# Patient Record
Sex: MALE | Race: WHITE | NOT HISPANIC OR LATINO | Employment: OTHER | URBAN - METROPOLITAN AREA
[De-identification: names, ages, dates, MRNs, and addresses within clinical notes are randomized per-mention and may not be internally consistent; named-entity substitution may affect disease eponyms.]

---

## 2017-01-03 ENCOUNTER — AMBULATORY - HEALTHEAST (OUTPATIENT)
Dept: CARDIOLOGY | Facility: CLINIC | Age: 72
End: 2017-01-03

## 2017-01-03 DIAGNOSIS — I48.19 PERSISTENT ATRIAL FIBRILLATION (H): ICD-10-CM

## 2017-01-05 ENCOUNTER — AMBULATORY - HEALTHEAST (OUTPATIENT)
Dept: CARDIOLOGY | Facility: CLINIC | Age: 72
End: 2017-01-05

## 2017-01-05 DIAGNOSIS — Z95.810 ICD (IMPLANTABLE CARDIOVERTER-DEFIBRILLATOR) IN PLACE: ICD-10-CM

## 2017-01-05 DIAGNOSIS — I48.91 AF (ATRIAL FIBRILLATION) (H): ICD-10-CM

## 2017-01-05 ASSESSMENT — MIFFLIN-ST. JEOR: SCORE: 2040.34

## 2017-01-10 ENCOUNTER — AMBULATORY - HEALTHEAST (OUTPATIENT)
Dept: CARDIOLOGY | Facility: CLINIC | Age: 72
End: 2017-01-10

## 2017-01-10 DIAGNOSIS — I48.19 PERSISTENT ATRIAL FIBRILLATION (H): ICD-10-CM

## 2017-01-11 ENCOUNTER — AMBULATORY - HEALTHEAST (OUTPATIENT)
Dept: CARDIOLOGY | Facility: CLINIC | Age: 72
End: 2017-01-11

## 2017-01-16 ENCOUNTER — COMMUNICATION - HEALTHEAST (OUTPATIENT)
Dept: CARDIOLOGY | Facility: CLINIC | Age: 72
End: 2017-01-16

## 2017-01-18 ENCOUNTER — AMBULATORY - HEALTHEAST (OUTPATIENT)
Dept: CARDIOLOGY | Facility: CLINIC | Age: 72
End: 2017-01-18

## 2017-01-18 DIAGNOSIS — I48.19 PERSISTENT ATRIAL FIBRILLATION (H): ICD-10-CM

## 2017-01-24 ENCOUNTER — COMMUNICATION - HEALTHEAST (OUTPATIENT)
Dept: CARDIOLOGY | Facility: CLINIC | Age: 72
End: 2017-01-24

## 2017-01-24 ENCOUNTER — AMBULATORY - HEALTHEAST (OUTPATIENT)
Dept: CARDIOLOGY | Facility: CLINIC | Age: 72
End: 2017-01-24

## 2017-01-24 DIAGNOSIS — I48.19 PERSISTENT ATRIAL FIBRILLATION (H): ICD-10-CM

## 2017-01-24 DIAGNOSIS — I42.8 OTHER PRIMARY CARDIOMYOPATHIES: ICD-10-CM

## 2017-01-31 ENCOUNTER — AMBULATORY - HEALTHEAST (OUTPATIENT)
Dept: CARDIOLOGY | Facility: CLINIC | Age: 72
End: 2017-01-31

## 2017-01-31 DIAGNOSIS — I48.19 PERSISTENT ATRIAL FIBRILLATION (H): ICD-10-CM

## 2017-02-09 ENCOUNTER — COMMUNICATION - HEALTHEAST (OUTPATIENT)
Dept: CARDIOLOGY | Facility: CLINIC | Age: 72
End: 2017-02-09

## 2017-02-09 DIAGNOSIS — I48.19 PERSISTENT ATRIAL FIBRILLATION (H): ICD-10-CM

## 2017-02-09 DIAGNOSIS — I42.9 IDIOPATHIC CARDIOMYOPATHY (H): ICD-10-CM

## 2017-02-21 ENCOUNTER — AMBULATORY - HEALTHEAST (OUTPATIENT)
Dept: CARDIOLOGY | Facility: CLINIC | Age: 72
End: 2017-02-21

## 2017-02-21 DIAGNOSIS — I48.19 PERSISTENT ATRIAL FIBRILLATION (H): ICD-10-CM

## 2017-02-22 ENCOUNTER — COMMUNICATION - HEALTHEAST (OUTPATIENT)
Dept: CARDIOLOGY | Facility: CLINIC | Age: 72
End: 2017-02-22

## 2017-02-22 DIAGNOSIS — I48.19 PERSISTENT ATRIAL FIBRILLATION (H): ICD-10-CM

## 2017-02-28 ENCOUNTER — AMBULATORY - HEALTHEAST (OUTPATIENT)
Dept: CARDIOLOGY | Facility: CLINIC | Age: 72
End: 2017-02-28

## 2017-02-28 DIAGNOSIS — I48.19 PERSISTENT ATRIAL FIBRILLATION (H): ICD-10-CM

## 2017-03-06 ENCOUNTER — AMBULATORY - HEALTHEAST (OUTPATIENT)
Dept: CARDIOLOGY | Facility: CLINIC | Age: 72
End: 2017-03-06

## 2017-03-06 DIAGNOSIS — Z95.810 ICD (IMPLANTABLE CARDIOVERTER-DEFIBRILLATOR) IN PLACE: ICD-10-CM

## 2017-03-07 ENCOUNTER — AMBULATORY - HEALTHEAST (OUTPATIENT)
Dept: CARDIOLOGY | Facility: CLINIC | Age: 72
End: 2017-03-07

## 2017-03-07 DIAGNOSIS — I48.19 PERSISTENT ATRIAL FIBRILLATION (H): ICD-10-CM

## 2017-03-09 ENCOUNTER — AMBULATORY - HEALTHEAST (OUTPATIENT)
Dept: CARDIOLOGY | Facility: CLINIC | Age: 72
End: 2017-03-09

## 2017-03-09 ENCOUNTER — OFFICE VISIT - HEALTHEAST (OUTPATIENT)
Dept: CARDIOLOGY | Facility: CLINIC | Age: 72
End: 2017-03-09

## 2017-03-09 DIAGNOSIS — Z86.79 HISTORY OF SICK SINUS SYNDROME: ICD-10-CM

## 2017-03-09 DIAGNOSIS — I10 ESSENTIAL HYPERTENSION WITH GOAL BLOOD PRESSURE LESS THAN 130/85: ICD-10-CM

## 2017-03-09 DIAGNOSIS — I48.19 PERSISTENT ATRIAL FIBRILLATION (H): ICD-10-CM

## 2017-03-09 DIAGNOSIS — I42.9 CARDIOMYOPATHY, IDIOPATHIC (H): ICD-10-CM

## 2017-03-09 ASSESSMENT — MIFFLIN-ST. JEOR: SCORE: 2102.94

## 2017-03-14 ENCOUNTER — AMBULATORY - HEALTHEAST (OUTPATIENT)
Dept: CARDIOLOGY | Facility: CLINIC | Age: 72
End: 2017-03-14

## 2017-03-14 DIAGNOSIS — I48.19 PERSISTENT ATRIAL FIBRILLATION (H): ICD-10-CM

## 2017-03-21 ENCOUNTER — AMBULATORY - HEALTHEAST (OUTPATIENT)
Dept: CARDIOLOGY | Facility: CLINIC | Age: 72
End: 2017-03-21

## 2017-03-21 DIAGNOSIS — I48.19 PERSISTENT ATRIAL FIBRILLATION (H): ICD-10-CM

## 2017-03-27 ENCOUNTER — AMBULATORY - HEALTHEAST (OUTPATIENT)
Dept: CARDIOLOGY | Facility: CLINIC | Age: 72
End: 2017-03-27

## 2017-03-27 ENCOUNTER — COMMUNICATION - HEALTHEAST (OUTPATIENT)
Dept: CARDIOLOGY | Facility: CLINIC | Age: 72
End: 2017-03-27

## 2017-03-27 DIAGNOSIS — Z95.810 ICD (IMPLANTABLE CARDIOVERTER-DEFIBRILLATOR) IN PLACE: ICD-10-CM

## 2017-03-27 DIAGNOSIS — I48.19 PERSISTENT ATRIAL FIBRILLATION (H): ICD-10-CM

## 2017-03-28 ENCOUNTER — AMBULATORY - HEALTHEAST (OUTPATIENT)
Dept: CARDIOLOGY | Facility: CLINIC | Age: 72
End: 2017-03-28

## 2017-03-28 DIAGNOSIS — I48.19 PERSISTENT ATRIAL FIBRILLATION (H): ICD-10-CM

## 2017-03-30 ENCOUNTER — COMMUNICATION - HEALTHEAST (OUTPATIENT)
Dept: CARDIOLOGY | Facility: CLINIC | Age: 72
End: 2017-03-30

## 2017-03-30 ENCOUNTER — AMBULATORY - HEALTHEAST (OUTPATIENT)
Dept: CARDIOLOGY | Facility: CLINIC | Age: 72
End: 2017-03-30

## 2017-04-04 ENCOUNTER — AMBULATORY - HEALTHEAST (OUTPATIENT)
Dept: CARDIOLOGY | Facility: CLINIC | Age: 72
End: 2017-04-04

## 2017-04-04 DIAGNOSIS — I48.19 PERSISTENT ATRIAL FIBRILLATION (H): ICD-10-CM

## 2017-04-05 ENCOUNTER — AMBULATORY - HEALTHEAST (OUTPATIENT)
Dept: CARDIOLOGY | Facility: CLINIC | Age: 72
End: 2017-04-05

## 2017-04-05 DIAGNOSIS — Z95.810 ICD (IMPLANTABLE CARDIOVERTER-DEFIBRILLATOR), DUAL, IN SITU: ICD-10-CM

## 2017-04-11 ENCOUNTER — AMBULATORY - HEALTHEAST (OUTPATIENT)
Dept: CARDIOLOGY | Facility: CLINIC | Age: 72
End: 2017-04-11

## 2017-04-11 DIAGNOSIS — I48.19 PERSISTENT ATRIAL FIBRILLATION (H): ICD-10-CM

## 2017-04-18 ENCOUNTER — AMBULATORY - HEALTHEAST (OUTPATIENT)
Dept: CARDIOLOGY | Facility: CLINIC | Age: 72
End: 2017-04-18

## 2017-04-18 DIAGNOSIS — I48.19 PERSISTENT ATRIAL FIBRILLATION (H): ICD-10-CM

## 2017-04-27 ENCOUNTER — COMMUNICATION - HEALTHEAST (OUTPATIENT)
Dept: CARDIOLOGY | Facility: CLINIC | Age: 72
End: 2017-04-27

## 2017-04-27 ENCOUNTER — AMBULATORY - HEALTHEAST (OUTPATIENT)
Dept: CARDIOLOGY | Facility: CLINIC | Age: 72
End: 2017-04-27

## 2017-04-27 DIAGNOSIS — Z95.810 ICD (IMPLANTABLE CARDIOVERTER-DEFIBRILLATOR), DUAL, IN SITU: ICD-10-CM

## 2017-04-28 ENCOUNTER — SURGERY - HEALTHEAST (OUTPATIENT)
Dept: GASTROENTEROLOGY | Facility: CLINIC | Age: 72
End: 2017-04-28

## 2017-04-28 ASSESSMENT — MIFFLIN-ST. JEOR: SCORE: 2008.59

## 2017-05-02 ENCOUNTER — AMBULATORY - HEALTHEAST (OUTPATIENT)
Dept: CARDIOLOGY | Facility: CLINIC | Age: 72
End: 2017-05-02

## 2017-05-02 DIAGNOSIS — I48.19 PERSISTENT ATRIAL FIBRILLATION (H): ICD-10-CM

## 2017-05-03 ENCOUNTER — COMMUNICATION - HEALTHEAST (OUTPATIENT)
Dept: CARDIOLOGY | Facility: CLINIC | Age: 72
End: 2017-05-03

## 2017-05-03 DIAGNOSIS — I48.19 PERSISTENT ATRIAL FIBRILLATION (H): ICD-10-CM

## 2017-05-09 ENCOUNTER — AMBULATORY - HEALTHEAST (OUTPATIENT)
Dept: CARDIOLOGY | Facility: CLINIC | Age: 72
End: 2017-05-09

## 2017-05-09 DIAGNOSIS — I48.19 PERSISTENT ATRIAL FIBRILLATION (H): ICD-10-CM

## 2017-05-16 ENCOUNTER — AMBULATORY - HEALTHEAST (OUTPATIENT)
Dept: CARDIOLOGY | Facility: CLINIC | Age: 72
End: 2017-05-16

## 2017-05-16 DIAGNOSIS — I48.19 PERSISTENT ATRIAL FIBRILLATION (H): ICD-10-CM

## 2017-05-19 ENCOUNTER — AMBULATORY - HEALTHEAST (OUTPATIENT)
Dept: CARDIOLOGY | Facility: CLINIC | Age: 72
End: 2017-05-19

## 2017-05-19 DIAGNOSIS — Z95.810 ICD (IMPLANTABLE CARDIOVERTER-DEFIBRILLATOR), DUAL, IN SITU: ICD-10-CM

## 2017-05-19 DIAGNOSIS — I48.19 PERSISTENT ATRIAL FIBRILLATION (H): ICD-10-CM

## 2017-05-19 DIAGNOSIS — I42.9 IDIOPATHIC CARDIOMYOPATHY (H): ICD-10-CM

## 2017-05-19 DIAGNOSIS — I42.9 CARDIOMYOPATHY, IDIOPATHIC (H): ICD-10-CM

## 2017-05-19 DIAGNOSIS — Z86.79 HISTORY OF SICK SINUS SYNDROME: ICD-10-CM

## 2017-05-19 LAB — HCC DEVICE COMMENTS: NORMAL

## 2017-05-19 ASSESSMENT — MIFFLIN-ST. JEOR: SCORE: 2081.17

## 2017-05-21 ENCOUNTER — COMMUNICATION - HEALTHEAST (OUTPATIENT)
Dept: CARDIOLOGY | Facility: CLINIC | Age: 72
End: 2017-05-21

## 2017-05-21 DIAGNOSIS — I48.0 PAROXYSMAL ATRIAL FIBRILLATION (H): ICD-10-CM

## 2017-05-23 ENCOUNTER — AMBULATORY - HEALTHEAST (OUTPATIENT)
Dept: CARDIOLOGY | Facility: CLINIC | Age: 72
End: 2017-05-23

## 2017-05-23 DIAGNOSIS — I48.19 PERSISTENT ATRIAL FIBRILLATION (H): ICD-10-CM

## 2017-05-30 ENCOUNTER — AMBULATORY - HEALTHEAST (OUTPATIENT)
Dept: CARDIOLOGY | Facility: CLINIC | Age: 72
End: 2017-05-30

## 2017-05-30 DIAGNOSIS — Z95.810 ICD (IMPLANTABLE CARDIOVERTER-DEFIBRILLATOR), DUAL, IN SITU: ICD-10-CM

## 2017-05-30 DIAGNOSIS — I48.19 PERSISTENT ATRIAL FIBRILLATION (H): ICD-10-CM

## 2017-05-30 LAB — HCC DEVICE COMMENTS: NORMAL

## 2017-05-31 ENCOUNTER — AMBULATORY - HEALTHEAST (OUTPATIENT)
Dept: CARDIOLOGY | Facility: CLINIC | Age: 72
End: 2017-05-31

## 2017-05-31 DIAGNOSIS — I48.19 PERSISTENT ATRIAL FIBRILLATION (H): ICD-10-CM

## 2017-06-06 ENCOUNTER — AMBULATORY - HEALTHEAST (OUTPATIENT)
Dept: CARDIOLOGY | Facility: CLINIC | Age: 72
End: 2017-06-06

## 2017-06-06 DIAGNOSIS — I48.19 PERSISTENT ATRIAL FIBRILLATION (H): ICD-10-CM

## 2017-06-07 ENCOUNTER — AMBULATORY - HEALTHEAST (OUTPATIENT)
Dept: CARDIOLOGY | Facility: CLINIC | Age: 72
End: 2017-06-07

## 2017-06-07 DIAGNOSIS — Z95.810 ICD (IMPLANTABLE CARDIOVERTER-DEFIBRILLATOR), DUAL, IN SITU: ICD-10-CM

## 2017-06-07 DIAGNOSIS — I42.9 CARDIOMYOPATHY, IDIOPATHIC (H): ICD-10-CM

## 2017-06-07 DIAGNOSIS — I48.19 PERSISTENT ATRIAL FIBRILLATION (H): ICD-10-CM

## 2017-06-07 DIAGNOSIS — Z98.890 STATUS POST CATHETER ABLATION OF ATRIAL FIBRILLATION: ICD-10-CM

## 2017-06-07 DIAGNOSIS — I10 ESSENTIAL HYPERTENSION WITH GOAL BLOOD PRESSURE LESS THAN 130/80: ICD-10-CM

## 2017-06-07 LAB — HCC DEVICE COMMENTS: NORMAL

## 2017-06-07 ASSESSMENT — MIFFLIN-ST. JEOR: SCORE: 2089.1

## 2017-06-13 ENCOUNTER — AMBULATORY - HEALTHEAST (OUTPATIENT)
Dept: CARDIOLOGY | Facility: CLINIC | Age: 72
End: 2017-06-13

## 2017-06-13 DIAGNOSIS — I48.19 PERSISTENT ATRIAL FIBRILLATION (H): ICD-10-CM

## 2017-06-14 ENCOUNTER — AMBULATORY - HEALTHEAST (OUTPATIENT)
Dept: CARDIOLOGY | Facility: CLINIC | Age: 72
End: 2017-06-14

## 2017-06-16 ENCOUNTER — AMBULATORY - HEALTHEAST (OUTPATIENT)
Dept: CARDIOLOGY | Facility: CLINIC | Age: 72
End: 2017-06-16

## 2017-06-16 ENCOUNTER — ANESTHESIA - HEALTHEAST (OUTPATIENT)
Dept: CARDIOLOGY | Facility: CLINIC | Age: 72
End: 2017-06-16

## 2017-06-16 DIAGNOSIS — Z95.810 ICD (IMPLANTABLE CARDIOVERTER-DEFIBRILLATOR), DUAL, IN SITU: ICD-10-CM

## 2017-06-19 ENCOUNTER — SURGERY - HEALTHEAST (OUTPATIENT)
Dept: CARDIOLOGY | Facility: CLINIC | Age: 72
End: 2017-06-19

## 2017-06-19 ENCOUNTER — HOSPITAL ENCOUNTER (OUTPATIENT)
Dept: CARDIOLOGY | Facility: HOSPITAL | Age: 72
Discharge: HOME | End: 2017-06-19
Attending: INTERNAL MEDICINE

## 2017-06-19 DIAGNOSIS — I42.9 CARDIOMYOPATHY, IDIOPATHIC (H): ICD-10-CM

## 2017-06-19 DIAGNOSIS — I47.19 ATRIAL TACHYCARDIA (H): ICD-10-CM

## 2017-06-19 LAB
AORTIC ROOT: 3.6 CM
BSA FOR ECHO PROCEDURE: 2.59 M2
CV BLOOD PRESSURE: NORMAL MMHG
CV ECHO HEIGHT: 75 IN
CV ECHO WEIGHT: 280 LBS
DOP CALC LVOT AREA: 3.46 CM2
DOP CALC LVOT DIAMETER: 2.1 CM
DOP CALC LVOT PEAK VEL: 81 CM/S
DOP CALC LVOT STROKE VOLUME: 54 CM3
DOP CALCLVOT PEAK VEL VTI: 15.6 CM
ECHO EJECTION FRACTION ESTIMATED: 50 %
EJECTION FRACTION: 68 % (ref 55–75)
FRACTIONAL SHORTENING: 30.4 % (ref 28–44)
HCC DEVICE COMMENTS: NORMAL
INTERVENTRICULAR SEPTUM IN END DIASTOLE: 1.1 CM (ref 0.6–1)
IVS/PW RATIO: 1
LEFT ATRIUM AREA: 30.8 CM2
LEFT ATRIUM LENGTH: 7.6 CM
LEFT ATRIUM SIZE: 5.7 CM
LEFT ATRIUM TO AORTIC ROOT RATIO: 1.58 NO UNITS
LEFT VENTRICLE DIASTOLIC VOLUME INDEX: 30.1 CM3/M2 (ref 34–74)
LEFT VENTRICLE DIASTOLIC VOLUME: 78 CM3 (ref 62–150)
LEFT VENTRICLE MASS INDEX: 96.3 G/M2
LEFT VENTRICLE SYSTOLIC VOLUME INDEX: 9.7 CM3/M2 (ref 11–31)
LEFT VENTRICLE SYSTOLIC VOLUME: 25 CM3 (ref 21–61)
LEFT VENTRICULAR INTERNAL DIMENSION IN DIASTOLE: 5.6 CM (ref 4.2–5.8)
LEFT VENTRICULAR INTERNAL DIMENSION IN SYSTOLE: 3.9 CM (ref 2.5–4)
LEFT VENTRICULAR MASS: 249.3 G
LEFT VENTRICULAR OUTFLOW TRACT MEAN GRADIENT: 2 MMHG
LEFT VENTRICULAR OUTFLOW TRACT MEAN VELOCITY: 58.2 CM/S
LEFT VENTRICULAR OUTFLOW TRACT PEAK GRADIENT: 3 MMHG
LEFT VENTRICULAR POSTERIOR WALL IN END DIASTOLE: 1.1 CM (ref 0.6–1)
LV STROKE VOLUME INDEX: 20.9 ML/M2
MITRAL VALVE E/A RATIO: 2.4
MV AVERAGE E/E' RATIO: 11.6 CM/S
MV DECELERATION TIME: 215 MS
MV E'TISSUE VEL-LAT: 9.36 CM/S
MV E'TISSUE VEL-MED: 7.21 CM/S
MV LATERAL E/E' RATIO: 10.3
MV MEDIAL E/E' RATIO: 13.4
MV PEAK A VELOCITY: 40.6 CM/S
MV PEAK E VELOCITY: 96.5 CM/S
NUC REST DIASTOLIC VOLUME INDEX: 4480 LBS
NUC REST SYSTOLIC VOLUME INDEX: 75 IN
PR MAX PG: 8 MMHG
PR PEAK VELOCITY: 144 CM/S
TRICUSPID REGURGITATION PEAK PRESSURE GRADIENT: 55.7 MMHG
TRICUSPID VALVE ANULAR PLANE SYSTOLIC EXCURSION: 2 CM
TRICUSPID VALVE PEAK REGURGITANT VELOCITY: 373 CM/S

## 2017-06-19 ASSESSMENT — MIFFLIN-ST. JEOR: SCORE: 2085.7

## 2017-06-20 ENCOUNTER — AMBULATORY - HEALTHEAST (OUTPATIENT)
Dept: CARDIOLOGY | Facility: CLINIC | Age: 72
End: 2017-06-20

## 2017-06-20 DIAGNOSIS — I48.19 PERSISTENT ATRIAL FIBRILLATION (H): ICD-10-CM

## 2017-06-27 ENCOUNTER — AMBULATORY - HEALTHEAST (OUTPATIENT)
Dept: CARDIOLOGY | Facility: CLINIC | Age: 72
End: 2017-06-27

## 2017-06-27 DIAGNOSIS — I48.19 PERSISTENT ATRIAL FIBRILLATION (H): ICD-10-CM

## 2017-07-06 ENCOUNTER — AMBULATORY - HEALTHEAST (OUTPATIENT)
Dept: CARDIOLOGY | Facility: CLINIC | Age: 72
End: 2017-07-06

## 2017-07-06 DIAGNOSIS — I48.19 PERSISTENT ATRIAL FIBRILLATION (H): ICD-10-CM

## 2017-07-06 DIAGNOSIS — Z86.79 HISTORY OF SICK SINUS SYNDROME: ICD-10-CM

## 2017-07-06 DIAGNOSIS — Z95.810 ICD (IMPLANTABLE CARDIOVERTER-DEFIBRILLATOR), DUAL, IN SITU: ICD-10-CM

## 2017-07-06 DIAGNOSIS — I42.9 CARDIOMYOPATHY, IDIOPATHIC (H): ICD-10-CM

## 2017-07-06 DIAGNOSIS — Z98.890 STATUS POST CATHETER ABLATION OF ATRIAL FIBRILLATION: ICD-10-CM

## 2017-07-06 DIAGNOSIS — I10 ESSENTIAL HYPERTENSION WITH GOAL BLOOD PRESSURE LESS THAN 130/80: ICD-10-CM

## 2017-07-06 LAB — HCC DEVICE COMMENTS: NORMAL

## 2017-07-06 ASSESSMENT — MIFFLIN-ST. JEOR: SCORE: 2066.42

## 2017-07-07 ENCOUNTER — AMBULATORY - HEALTHEAST (OUTPATIENT)
Dept: CARDIOLOGY | Facility: CLINIC | Age: 72
End: 2017-07-07

## 2017-07-07 ENCOUNTER — COMMUNICATION - HEALTHEAST (OUTPATIENT)
Dept: CARDIOLOGY | Facility: CLINIC | Age: 72
End: 2017-07-07

## 2017-07-07 DIAGNOSIS — I42.9 CARDIOMYOPATHY (H): ICD-10-CM

## 2017-07-07 DIAGNOSIS — I11.0 HYPERTENSIVE HEART DISEASE WITH HEART FAILURE (H): ICD-10-CM

## 2017-07-11 ENCOUNTER — AMBULATORY - HEALTHEAST (OUTPATIENT)
Dept: CARDIOLOGY | Facility: CLINIC | Age: 72
End: 2017-07-11

## 2017-07-11 DIAGNOSIS — I48.19 PERSISTENT ATRIAL FIBRILLATION (H): ICD-10-CM

## 2017-07-18 ENCOUNTER — AMBULATORY - HEALTHEAST (OUTPATIENT)
Dept: CARDIOLOGY | Facility: CLINIC | Age: 72
End: 2017-07-18

## 2017-07-18 DIAGNOSIS — I48.19 PERSISTENT ATRIAL FIBRILLATION (H): ICD-10-CM

## 2017-07-21 ENCOUNTER — COMMUNICATION - HEALTHEAST (OUTPATIENT)
Dept: CARDIOLOGY | Facility: CLINIC | Age: 72
End: 2017-07-21

## 2017-07-21 DIAGNOSIS — I48.0 PAROXYSMAL ATRIAL FIBRILLATION (H): ICD-10-CM

## 2017-07-25 ENCOUNTER — AMBULATORY - HEALTHEAST (OUTPATIENT)
Dept: CARDIOLOGY | Facility: CLINIC | Age: 72
End: 2017-07-25

## 2017-07-25 DIAGNOSIS — I48.19 PERSISTENT ATRIAL FIBRILLATION (H): ICD-10-CM

## 2017-08-01 ENCOUNTER — AMBULATORY - HEALTHEAST (OUTPATIENT)
Dept: CARDIOLOGY | Facility: CLINIC | Age: 72
End: 2017-08-01

## 2017-08-01 DIAGNOSIS — I48.19 PERSISTENT ATRIAL FIBRILLATION (H): ICD-10-CM

## 2017-08-02 ENCOUNTER — COMMUNICATION - HEALTHEAST (OUTPATIENT)
Dept: CARDIOLOGY | Facility: CLINIC | Age: 72
End: 2017-08-02

## 2017-08-02 DIAGNOSIS — I48.19 PERSISTENT ATRIAL FIBRILLATION (H): ICD-10-CM

## 2017-08-06 ENCOUNTER — AMBULATORY - HEALTHEAST (OUTPATIENT)
Dept: CARDIOLOGY | Facility: CLINIC | Age: 72
End: 2017-08-06

## 2017-08-06 DIAGNOSIS — Z95.810 ICD (IMPLANTABLE CARDIOVERTER-DEFIBRILLATOR), DUAL, IN SITU: ICD-10-CM

## 2017-08-07 ENCOUNTER — COMMUNICATION - HEALTHEAST (OUTPATIENT)
Dept: CARDIOLOGY | Facility: CLINIC | Age: 72
End: 2017-08-07

## 2017-08-07 LAB — HCC DEVICE COMMENTS: NORMAL

## 2017-08-08 ENCOUNTER — AMBULATORY - HEALTHEAST (OUTPATIENT)
Dept: CARDIOLOGY | Facility: CLINIC | Age: 72
End: 2017-08-08

## 2017-08-08 DIAGNOSIS — I48.19 PERSISTENT ATRIAL FIBRILLATION (H): ICD-10-CM

## 2017-08-15 ENCOUNTER — AMBULATORY - HEALTHEAST (OUTPATIENT)
Dept: CARDIOLOGY | Facility: CLINIC | Age: 72
End: 2017-08-15

## 2017-08-15 DIAGNOSIS — I48.19 PERSISTENT ATRIAL FIBRILLATION (H): ICD-10-CM

## 2017-08-22 ENCOUNTER — AMBULATORY - HEALTHEAST (OUTPATIENT)
Dept: CARDIOLOGY | Facility: CLINIC | Age: 72
End: 2017-08-22

## 2017-08-22 DIAGNOSIS — I48.19 PERSISTENT ATRIAL FIBRILLATION (H): ICD-10-CM

## 2017-08-29 ENCOUNTER — AMBULATORY - HEALTHEAST (OUTPATIENT)
Dept: CARDIOLOGY | Facility: CLINIC | Age: 72
End: 2017-08-29

## 2017-08-29 DIAGNOSIS — I48.19 PERSISTENT ATRIAL FIBRILLATION (H): ICD-10-CM

## 2017-09-06 ENCOUNTER — AMBULATORY - HEALTHEAST (OUTPATIENT)
Dept: CARDIOLOGY | Facility: CLINIC | Age: 72
End: 2017-09-06

## 2017-09-06 DIAGNOSIS — I48.19 PERSISTENT ATRIAL FIBRILLATION (H): ICD-10-CM

## 2017-09-07 ENCOUNTER — AMBULATORY - HEALTHEAST (OUTPATIENT)
Dept: CARDIOLOGY | Facility: CLINIC | Age: 72
End: 2017-09-07

## 2017-09-07 DIAGNOSIS — Z98.890 STATUS POST CATHETER ABLATION OF ATRIAL FIBRILLATION: ICD-10-CM

## 2017-09-07 DIAGNOSIS — I42.9 CARDIOMYOPATHY, IDIOPATHIC (H): ICD-10-CM

## 2017-09-07 DIAGNOSIS — Z95.810 ICD (IMPLANTABLE CARDIOVERTER-DEFIBRILLATOR), DUAL, IN SITU: ICD-10-CM

## 2017-09-07 DIAGNOSIS — I10 ESSENTIAL HYPERTENSION WITH GOAL BLOOD PRESSURE LESS THAN 130/80: ICD-10-CM

## 2017-09-07 DIAGNOSIS — I48.0 PAROXYSMAL ATRIAL FIBRILLATION (H): ICD-10-CM

## 2017-09-07 DIAGNOSIS — I11.0 HYPERTENSIVE HEART DISEASE WITH HEART FAILURE (H): ICD-10-CM

## 2017-09-07 DIAGNOSIS — I42.9 CARDIOMYOPATHY (H): ICD-10-CM

## 2017-09-07 ASSESSMENT — MIFFLIN-ST. JEOR: SCORE: 2098.17

## 2017-09-11 LAB — HCC DEVICE COMMENTS: NORMAL

## 2017-09-12 ENCOUNTER — AMBULATORY - HEALTHEAST (OUTPATIENT)
Dept: CARDIOLOGY | Facility: CLINIC | Age: 72
End: 2017-09-12

## 2017-09-12 DIAGNOSIS — I48.19 PERSISTENT ATRIAL FIBRILLATION (H): ICD-10-CM

## 2017-09-13 ENCOUNTER — HOSPITAL ENCOUNTER (OUTPATIENT)
Dept: NUCLEAR MEDICINE | Facility: HOSPITAL | Age: 72
Discharge: HOME | End: 2017-09-13
Attending: INTERNAL MEDICINE

## 2017-09-13 ENCOUNTER — HOSPITAL ENCOUNTER (OUTPATIENT)
Dept: CARDIOLOGY | Facility: HOSPITAL | Age: 72
Discharge: HOME | End: 2017-09-13
Attending: INTERNAL MEDICINE

## 2017-09-13 DIAGNOSIS — I42.9 CARDIOMYOPATHY, IDIOPATHIC (H): ICD-10-CM

## 2017-09-13 LAB
CV STRESS MAX HR HE: 79
NUC STRESS EJECTION FRACTION: 53 %
STRESS ECHO BASELINE BP: NORMAL M/S
STRESS ECHO BASELINE HR: 72 BPM
STRESS ECHO CALCULATED PERCENT HR: 53 %
STRESS ECHO LAST STRESS BP: NORMAL M/S

## 2017-09-14 ENCOUNTER — RECORDS - HEALTHEAST (OUTPATIENT)
Dept: LAB | Facility: CLINIC | Age: 72
End: 2017-09-14

## 2017-09-14 ENCOUNTER — RECORDS - HEALTHEAST (OUTPATIENT)
Dept: ADMINISTRATIVE | Facility: OTHER | Age: 72
End: 2017-09-14

## 2017-09-14 LAB
CHOLEST SERPL-MCNC: 143 MG/DL
FASTING STATUS PATIENT QL REPORTED: YES
HDLC SERPL-MCNC: 51 MG/DL
LDLC SERPL CALC-MCNC: 78 MG/DL
PSA SERPL-MCNC: 0.4 NG/ML (ref 0–6.5)
TRIGL SERPL-MCNC: 69 MG/DL

## 2017-09-15 ENCOUNTER — AMBULATORY - HEALTHEAST (OUTPATIENT)
Dept: CARDIOLOGY | Facility: CLINIC | Age: 72
End: 2017-09-15

## 2017-09-15 ENCOUNTER — COMMUNICATION - HEALTHEAST (OUTPATIENT)
Dept: CARDIOLOGY | Facility: CLINIC | Age: 72
End: 2017-09-15

## 2017-09-15 ENCOUNTER — SURGERY - HEALTHEAST (OUTPATIENT)
Dept: CARDIOLOGY | Facility: CLINIC | Age: 72
End: 2017-09-15

## 2017-09-15 DIAGNOSIS — R06.09 DYSPNEA ON EXERTION: ICD-10-CM

## 2017-09-15 DIAGNOSIS — R94.39 ABNORMAL NUCLEAR STRESS TEST: ICD-10-CM

## 2017-09-15 DIAGNOSIS — I48.19 PERSISTENT ATRIAL FIBRILLATION (H): ICD-10-CM

## 2017-09-18 ENCOUNTER — AMBULATORY - HEALTHEAST (OUTPATIENT)
Dept: CARDIOLOGY | Facility: CLINIC | Age: 72
End: 2017-09-18

## 2017-09-20 ENCOUNTER — SURGERY - HEALTHEAST (OUTPATIENT)
Dept: CARDIOLOGY | Facility: CLINIC | Age: 72
End: 2017-09-20

## 2017-09-20 ASSESSMENT — MIFFLIN-ST. JEOR: SCORE: 2017.66

## 2017-09-21 ASSESSMENT — MIFFLIN-ST. JEOR: SCORE: 2035.35

## 2017-09-26 ENCOUNTER — AMBULATORY - HEALTHEAST (OUTPATIENT)
Dept: CARDIOLOGY | Facility: CLINIC | Age: 72
End: 2017-09-26

## 2017-09-26 DIAGNOSIS — I48.19 PERSISTENT ATRIAL FIBRILLATION (H): ICD-10-CM

## 2017-10-03 ENCOUNTER — AMBULATORY - HEALTHEAST (OUTPATIENT)
Dept: CARDIOLOGY | Facility: CLINIC | Age: 72
End: 2017-10-03

## 2017-10-03 DIAGNOSIS — I48.19 PERSISTENT ATRIAL FIBRILLATION (H): ICD-10-CM

## 2017-10-08 ENCOUNTER — AMBULATORY - HEALTHEAST (OUTPATIENT)
Dept: INTENSIVE CARE | Facility: CLINIC | Age: 72
End: 2017-10-08

## 2017-10-08 DIAGNOSIS — J85.2 LUNG ABSCESS (H): ICD-10-CM

## 2017-10-09 ENCOUNTER — COMMUNICATION - HEALTHEAST (OUTPATIENT)
Dept: CARDIOLOGY | Facility: CLINIC | Age: 72
End: 2017-10-09

## 2017-10-11 ENCOUNTER — AMBULATORY - HEALTHEAST (OUTPATIENT)
Dept: CARDIOLOGY | Facility: CLINIC | Age: 72
End: 2017-10-11

## 2017-10-11 DIAGNOSIS — Z95.810 ICD (IMPLANTABLE CARDIOVERTER-DEFIBRILLATOR), DUAL, IN SITU: ICD-10-CM

## 2017-10-11 DIAGNOSIS — I48.0 PAROXYSMAL ATRIAL FIBRILLATION (H): ICD-10-CM

## 2017-10-11 DIAGNOSIS — Z98.890 STATUS POST CATHETER ABLATION OF ATRIAL FIBRILLATION: ICD-10-CM

## 2017-10-11 DIAGNOSIS — I10 ESSENTIAL HYPERTENSION: ICD-10-CM

## 2017-10-11 DIAGNOSIS — I25.10 CORONARY ARTERY DISEASE INVOLVING NATIVE CORONARY ARTERY OF NATIVE HEART WITHOUT ANGINA PECTORIS: ICD-10-CM

## 2017-10-11 ASSESSMENT — MIFFLIN-ST. JEOR: SCORE: 2063.02

## 2017-10-12 ENCOUNTER — RECORDS - HEALTHEAST (OUTPATIENT)
Dept: ADMINISTRATIVE | Facility: OTHER | Age: 72
End: 2017-10-12

## 2017-10-12 ENCOUNTER — AMBULATORY - HEALTHEAST (OUTPATIENT)
Dept: CARDIOLOGY | Facility: CLINIC | Age: 72
End: 2017-10-12

## 2017-10-12 ENCOUNTER — COMMUNICATION - HEALTHEAST (OUTPATIENT)
Dept: CARDIOLOGY | Facility: CLINIC | Age: 72
End: 2017-10-12

## 2017-10-18 ENCOUNTER — COMMUNICATION - HEALTHEAST (OUTPATIENT)
Dept: CARDIOLOGY | Facility: CLINIC | Age: 72
End: 2017-10-18

## 2017-10-19 ENCOUNTER — COMMUNICATION - HEALTHEAST (OUTPATIENT)
Dept: CARDIOLOGY | Facility: CLINIC | Age: 72
End: 2017-10-19

## 2017-10-20 ENCOUNTER — SURGERY - HEALTHEAST (OUTPATIENT)
Dept: CARDIOLOGY | Facility: CLINIC | Age: 72
End: 2017-10-20

## 2017-10-20 ENCOUNTER — AMBULATORY - HEALTHEAST (OUTPATIENT)
Dept: CARDIOLOGY | Facility: CLINIC | Age: 72
End: 2017-10-20

## 2017-10-20 DIAGNOSIS — I48.91 A-FIB (H): ICD-10-CM

## 2017-10-24 ENCOUNTER — AMBULATORY - HEALTHEAST (OUTPATIENT)
Dept: CARDIOLOGY | Facility: CLINIC | Age: 72
End: 2017-10-24

## 2017-10-24 DIAGNOSIS — I48.19 PERSISTENT ATRIAL FIBRILLATION (H): ICD-10-CM

## 2017-10-25 ENCOUNTER — HOSPITAL ENCOUNTER (OUTPATIENT)
Dept: CT IMAGING | Facility: HOSPITAL | Age: 72
Discharge: HOME | End: 2017-10-25
Attending: INTERNAL MEDICINE

## 2017-10-25 ENCOUNTER — OFFICE VISIT - HEALTHEAST (OUTPATIENT)
Dept: PULMONOLOGY | Facility: OTHER | Age: 72
End: 2017-10-25

## 2017-10-25 DIAGNOSIS — J85.2 LUNG ABSCESS (H): ICD-10-CM

## 2017-10-25 DIAGNOSIS — J18.9 PNEUMONIA OF LEFT LOWER LOBE DUE TO INFECTIOUS ORGANISM: ICD-10-CM

## 2017-10-28 ENCOUNTER — COMMUNICATION - HEALTHEAST (OUTPATIENT)
Dept: CARDIOLOGY | Facility: CLINIC | Age: 72
End: 2017-10-28

## 2017-10-28 DIAGNOSIS — I48.19 PERSISTENT ATRIAL FIBRILLATION (H): ICD-10-CM

## 2017-10-28 DIAGNOSIS — E83.42 HYPOMAGNESEMIA: ICD-10-CM

## 2017-10-31 ENCOUNTER — AMBULATORY - HEALTHEAST (OUTPATIENT)
Dept: CARDIOLOGY | Facility: CLINIC | Age: 72
End: 2017-10-31

## 2017-10-31 DIAGNOSIS — I48.19 PERSISTENT ATRIAL FIBRILLATION (H): ICD-10-CM

## 2017-11-07 ENCOUNTER — AMBULATORY - HEALTHEAST (OUTPATIENT)
Dept: CARDIOLOGY | Facility: CLINIC | Age: 72
End: 2017-11-07

## 2017-11-07 DIAGNOSIS — I48.19 PERSISTENT ATRIAL FIBRILLATION (H): ICD-10-CM

## 2017-11-08 ENCOUNTER — OFFICE VISIT - HEALTHEAST (OUTPATIENT)
Dept: CARDIOLOGY | Facility: CLINIC | Age: 72
End: 2017-11-08

## 2017-11-08 DIAGNOSIS — E78.5 DYSLIPIDEMIA, GOAL LDL BELOW 70: ICD-10-CM

## 2017-11-08 DIAGNOSIS — I25.10 CORONARY ARTERY DISEASE DUE TO CALCIFIED CORONARY LESION: ICD-10-CM

## 2017-11-08 DIAGNOSIS — I25.84 CORONARY ARTERY DISEASE DUE TO CALCIFIED CORONARY LESION: ICD-10-CM

## 2017-11-08 ASSESSMENT — MIFFLIN-ST. JEOR: SCORE: 2063.02

## 2017-11-14 ENCOUNTER — AMBULATORY - HEALTHEAST (OUTPATIENT)
Dept: CARDIOLOGY | Facility: CLINIC | Age: 72
End: 2017-11-14

## 2017-11-14 DIAGNOSIS — I48.19 PERSISTENT ATRIAL FIBRILLATION (H): ICD-10-CM

## 2017-11-15 ENCOUNTER — AMBULATORY - HEALTHEAST (OUTPATIENT)
Dept: CARDIAC REHAB | Facility: HOSPITAL | Age: 72
End: 2017-11-15

## 2017-11-15 DIAGNOSIS — Z95.5 STENTED CORONARY ARTERY: ICD-10-CM

## 2017-11-15 DIAGNOSIS — R94.39 ABNORMAL NUCLEAR STRESS TEST: ICD-10-CM

## 2017-11-17 ENCOUNTER — COMMUNICATION - HEALTHEAST (OUTPATIENT)
Dept: CARDIOLOGY | Facility: CLINIC | Age: 72
End: 2017-11-17

## 2017-11-21 ENCOUNTER — AMBULATORY - HEALTHEAST (OUTPATIENT)
Dept: CARDIOLOGY | Facility: CLINIC | Age: 72
End: 2017-11-21

## 2017-11-21 ENCOUNTER — AMBULATORY - HEALTHEAST (OUTPATIENT)
Dept: CARDIAC REHAB | Facility: HOSPITAL | Age: 72
End: 2017-11-21

## 2017-11-21 DIAGNOSIS — I48.91 ATRIAL FIBRILLATION (H): ICD-10-CM

## 2017-11-21 DIAGNOSIS — Z95.5 STENTED CORONARY ARTERY: ICD-10-CM

## 2017-11-28 ENCOUNTER — AMBULATORY - HEALTHEAST (OUTPATIENT)
Dept: CARDIOLOGY | Facility: CLINIC | Age: 72
End: 2017-11-28

## 2017-11-28 ENCOUNTER — AMBULATORY - HEALTHEAST (OUTPATIENT)
Dept: CARDIAC REHAB | Facility: HOSPITAL | Age: 72
End: 2017-11-28

## 2017-11-28 DIAGNOSIS — Z95.5 STENTED CORONARY ARTERY: ICD-10-CM

## 2017-11-28 DIAGNOSIS — I48.19 PERSISTENT ATRIAL FIBRILLATION (H): ICD-10-CM

## 2017-11-29 ENCOUNTER — COMMUNICATION - HEALTHEAST (OUTPATIENT)
Dept: CARDIOLOGY | Facility: CLINIC | Age: 72
End: 2017-11-29

## 2017-11-29 DIAGNOSIS — I48.0 PAROXYSMAL ATRIAL FIBRILLATION (H): ICD-10-CM

## 2017-11-30 ENCOUNTER — AMBULATORY - HEALTHEAST (OUTPATIENT)
Dept: CARDIOLOGY | Facility: CLINIC | Age: 72
End: 2017-11-30

## 2017-11-30 ENCOUNTER — OFFICE VISIT - HEALTHEAST (OUTPATIENT)
Dept: CARDIOLOGY | Facility: CLINIC | Age: 72
End: 2017-11-30

## 2017-11-30 ENCOUNTER — COMMUNICATION - HEALTHEAST (OUTPATIENT)
Dept: CARDIOLOGY | Facility: CLINIC | Age: 72
End: 2017-11-30

## 2017-11-30 DIAGNOSIS — I10 ESSENTIAL HYPERTENSION: ICD-10-CM

## 2017-11-30 DIAGNOSIS — I48.19 PERSISTENT ATRIAL FIBRILLATION (H): ICD-10-CM

## 2017-11-30 DIAGNOSIS — I25.84 CORONARY ARTERY DISEASE DUE TO CALCIFIED CORONARY LESION: ICD-10-CM

## 2017-11-30 DIAGNOSIS — I25.10 CORONARY ARTERY DISEASE DUE TO CALCIFIED CORONARY LESION: ICD-10-CM

## 2017-11-30 DIAGNOSIS — Z00.6 RESEARCH EXAM: ICD-10-CM

## 2017-11-30 DIAGNOSIS — Z98.890 STATUS POST CATHETER ABLATION OF ATRIAL FIBRILLATION: ICD-10-CM

## 2017-11-30 ASSESSMENT — MIFFLIN-ST. JEOR: SCORE: 2063.02

## 2017-12-05 ENCOUNTER — AMBULATORY - HEALTHEAST (OUTPATIENT)
Dept: CARDIAC REHAB | Facility: HOSPITAL | Age: 72
End: 2017-12-05

## 2017-12-05 DIAGNOSIS — Z95.5 STENTED CORONARY ARTERY: ICD-10-CM

## 2017-12-07 ENCOUNTER — AMBULATORY - HEALTHEAST (OUTPATIENT)
Dept: CARDIAC REHAB | Facility: HOSPITAL | Age: 72
End: 2017-12-07

## 2017-12-07 DIAGNOSIS — Z95.5 STENTED CORONARY ARTERY: ICD-10-CM

## 2017-12-12 ENCOUNTER — AMBULATORY - HEALTHEAST (OUTPATIENT)
Dept: CARDIAC REHAB | Facility: HOSPITAL | Age: 72
End: 2017-12-12

## 2017-12-12 ENCOUNTER — AMBULATORY - HEALTHEAST (OUTPATIENT)
Dept: CARDIOLOGY | Facility: CLINIC | Age: 72
End: 2017-12-12

## 2017-12-12 DIAGNOSIS — Z95.5 STENTED CORONARY ARTERY: ICD-10-CM

## 2017-12-12 DIAGNOSIS — I48.19 PERSISTENT ATRIAL FIBRILLATION (H): ICD-10-CM

## 2017-12-14 ENCOUNTER — AMBULATORY - HEALTHEAST (OUTPATIENT)
Dept: CARDIAC REHAB | Facility: HOSPITAL | Age: 72
End: 2017-12-14

## 2017-12-14 DIAGNOSIS — Z95.5 STENTED CORONARY ARTERY: ICD-10-CM

## 2017-12-19 ENCOUNTER — AMBULATORY - HEALTHEAST (OUTPATIENT)
Dept: CARDIOLOGY | Facility: CLINIC | Age: 72
End: 2017-12-19

## 2017-12-19 DIAGNOSIS — I48.19 PERSISTENT ATRIAL FIBRILLATION (H): ICD-10-CM

## 2017-12-21 ENCOUNTER — RECORDS - HEALTHEAST (OUTPATIENT)
Dept: ADMINISTRATIVE | Facility: OTHER | Age: 72
End: 2017-12-21

## 2017-12-21 ENCOUNTER — AMBULATORY - HEALTHEAST (OUTPATIENT)
Dept: CARDIOLOGY | Facility: CLINIC | Age: 72
End: 2017-12-21

## 2017-12-26 ENCOUNTER — AMBULATORY - HEALTHEAST (OUTPATIENT)
Dept: CARDIOLOGY | Facility: CLINIC | Age: 72
End: 2017-12-26

## 2017-12-26 ENCOUNTER — AMBULATORY - HEALTHEAST (OUTPATIENT)
Dept: CARDIAC REHAB | Facility: HOSPITAL | Age: 72
End: 2017-12-26

## 2017-12-26 DIAGNOSIS — I48.19 PERSISTENT ATRIAL FIBRILLATION (H): ICD-10-CM

## 2017-12-26 DIAGNOSIS — Z95.5 STENTED CORONARY ARTERY: ICD-10-CM

## 2017-12-27 ENCOUNTER — OFFICE VISIT - HEALTHEAST (OUTPATIENT)
Dept: PULMONOLOGY | Facility: OTHER | Age: 72
End: 2017-12-27

## 2017-12-27 DIAGNOSIS — J44.9 COPD (CHRONIC OBSTRUCTIVE PULMONARY DISEASE) (H): ICD-10-CM

## 2017-12-28 ENCOUNTER — AMBULATORY - HEALTHEAST (OUTPATIENT)
Dept: CARDIOLOGY | Facility: CLINIC | Age: 72
End: 2017-12-28

## 2017-12-28 DIAGNOSIS — Z95.810 ICD (IMPLANTABLE CARDIOVERTER-DEFIBRILLATOR), DUAL, IN SITU: ICD-10-CM

## 2017-12-28 DIAGNOSIS — I25.10 CORONARY ARTERY DISEASE DUE TO CALCIFIED CORONARY LESION: ICD-10-CM

## 2017-12-28 DIAGNOSIS — I25.84 CORONARY ARTERY DISEASE DUE TO CALCIFIED CORONARY LESION: ICD-10-CM

## 2017-12-28 DIAGNOSIS — Z98.890 STATUS POST CATHETER ABLATION OF ATRIAL FIBRILLATION: ICD-10-CM

## 2017-12-28 DIAGNOSIS — I48.0 PAROXYSMAL ATRIAL FIBRILLATION (H): ICD-10-CM

## 2017-12-28 LAB — HCC DEVICE COMMENTS: NORMAL

## 2017-12-28 ASSESSMENT — MIFFLIN-ST. JEOR: SCORE: 2085.7

## 2018-01-02 ENCOUNTER — AMBULATORY - HEALTHEAST (OUTPATIENT)
Dept: CARDIAC REHAB | Facility: HOSPITAL | Age: 73
End: 2018-01-02

## 2018-01-02 DIAGNOSIS — Z95.5 STENTED CORONARY ARTERY: ICD-10-CM

## 2018-01-03 ENCOUNTER — AMBULATORY - HEALTHEAST (OUTPATIENT)
Dept: CARDIOLOGY | Facility: CLINIC | Age: 73
End: 2018-01-03

## 2018-01-03 DIAGNOSIS — I48.19 PERSISTENT ATRIAL FIBRILLATION (H): ICD-10-CM

## 2018-01-03 LAB — INR PPP: 2.7 (ref 0.9–1.1)

## 2018-01-04 ENCOUNTER — AMBULATORY - HEALTHEAST (OUTPATIENT)
Dept: CARDIAC REHAB | Facility: HOSPITAL | Age: 73
End: 2018-01-04

## 2018-01-04 DIAGNOSIS — Z95.5 STENTED CORONARY ARTERY: ICD-10-CM

## 2018-01-09 ENCOUNTER — AMBULATORY - HEALTHEAST (OUTPATIENT)
Dept: CARDIAC REHAB | Facility: HOSPITAL | Age: 73
End: 2018-01-09

## 2018-01-09 ENCOUNTER — AMBULATORY - HEALTHEAST (OUTPATIENT)
Dept: CARDIOLOGY | Facility: CLINIC | Age: 73
End: 2018-01-09

## 2018-01-09 DIAGNOSIS — I48.19 PERSISTENT ATRIAL FIBRILLATION (H): ICD-10-CM

## 2018-01-09 DIAGNOSIS — Z95.5 STENTED CORONARY ARTERY: ICD-10-CM

## 2018-01-09 LAB — INR PPP: 3 (ref 0.9–1.1)

## 2018-01-11 ENCOUNTER — HOSPITAL ENCOUNTER (OUTPATIENT)
Dept: RESPIRATORY THERAPY | Facility: HOSPITAL | Age: 73
Discharge: HOME | End: 2018-01-11
Attending: INTERNAL MEDICINE

## 2018-01-11 DIAGNOSIS — J18.1 LOBAR PNEUMONIA, UNSPECIFIED ORGANISM (H): ICD-10-CM

## 2018-01-11 DIAGNOSIS — J18.9 PNEUMONIA OF LEFT LOWER LOBE DUE TO INFECTIOUS ORGANISM: ICD-10-CM

## 2018-01-15 ENCOUNTER — COMMUNICATION - HEALTHEAST (OUTPATIENT)
Dept: PULMONOLOGY | Facility: OTHER | Age: 73
End: 2018-01-15

## 2018-01-16 ENCOUNTER — AMBULATORY - HEALTHEAST (OUTPATIENT)
Dept: CARDIOLOGY | Facility: CLINIC | Age: 73
End: 2018-01-16

## 2018-01-16 ENCOUNTER — AMBULATORY - HEALTHEAST (OUTPATIENT)
Dept: CARDIAC REHAB | Facility: HOSPITAL | Age: 73
End: 2018-01-16

## 2018-01-16 DIAGNOSIS — I48.19 PERSISTENT ATRIAL FIBRILLATION (H): ICD-10-CM

## 2018-01-16 DIAGNOSIS — R94.39 ABNORMAL NUCLEAR STRESS TEST: ICD-10-CM

## 2018-01-16 DIAGNOSIS — Z95.5 STENTED CORONARY ARTERY: ICD-10-CM

## 2018-01-16 LAB — INR PPP: 2.9 (ref 0.9–1.1)

## 2018-01-17 ENCOUNTER — COMMUNICATION - HEALTHEAST (OUTPATIENT)
Dept: CARDIOLOGY | Facility: CLINIC | Age: 73
End: 2018-01-17

## 2018-01-17 DIAGNOSIS — I48.91 AF (ATRIAL FIBRILLATION) (H): ICD-10-CM

## 2018-01-18 ENCOUNTER — AMBULATORY - HEALTHEAST (OUTPATIENT)
Dept: CARDIAC REHAB | Facility: HOSPITAL | Age: 73
End: 2018-01-18

## 2018-01-18 DIAGNOSIS — Z95.5 STENTED CORONARY ARTERY: ICD-10-CM

## 2018-01-23 ENCOUNTER — AMBULATORY - HEALTHEAST (OUTPATIENT)
Dept: CARDIOLOGY | Facility: CLINIC | Age: 73
End: 2018-01-23

## 2018-01-23 DIAGNOSIS — I48.19 PERSISTENT ATRIAL FIBRILLATION (H): ICD-10-CM

## 2018-01-23 LAB — INR PPP: 2.8 (ref 0.9–1.1)

## 2018-01-25 ENCOUNTER — AMBULATORY - HEALTHEAST (OUTPATIENT)
Dept: CARDIAC REHAB | Facility: HOSPITAL | Age: 73
End: 2018-01-25

## 2018-01-25 DIAGNOSIS — Z95.5 STENTED CORONARY ARTERY: ICD-10-CM

## 2018-01-30 ENCOUNTER — AMBULATORY - HEALTHEAST (OUTPATIENT)
Dept: CARDIOLOGY | Facility: CLINIC | Age: 73
End: 2018-01-30

## 2018-01-30 ENCOUNTER — AMBULATORY - HEALTHEAST (OUTPATIENT)
Dept: CARDIAC REHAB | Facility: HOSPITAL | Age: 73
End: 2018-01-30

## 2018-01-30 DIAGNOSIS — Z95.5 STENTED CORONARY ARTERY: ICD-10-CM

## 2018-01-30 DIAGNOSIS — I48.19 PERSISTENT ATRIAL FIBRILLATION (H): ICD-10-CM

## 2018-01-30 LAB — INR PPP: 2.7 (ref 0.9–1.1)

## 2018-02-01 ENCOUNTER — AMBULATORY - HEALTHEAST (OUTPATIENT)
Dept: CARDIAC REHAB | Facility: HOSPITAL | Age: 73
End: 2018-02-01

## 2018-02-01 DIAGNOSIS — Z95.5 STENTED CORONARY ARTERY: ICD-10-CM

## 2018-02-06 ENCOUNTER — AMBULATORY - HEALTHEAST (OUTPATIENT)
Dept: CARDIOLOGY | Facility: CLINIC | Age: 73
End: 2018-02-06

## 2018-02-06 ENCOUNTER — AMBULATORY - HEALTHEAST (OUTPATIENT)
Dept: CARDIAC REHAB | Facility: HOSPITAL | Age: 73
End: 2018-02-06

## 2018-02-06 DIAGNOSIS — Z95.5 STENTED CORONARY ARTERY: ICD-10-CM

## 2018-02-06 DIAGNOSIS — I48.19 PERSISTENT ATRIAL FIBRILLATION (H): ICD-10-CM

## 2018-02-06 LAB — INTERNATIONAL NORMALIZATION RATIO, POC - HISTORICAL: 2.2

## 2018-02-08 ENCOUNTER — AMBULATORY - HEALTHEAST (OUTPATIENT)
Dept: CARDIAC REHAB | Facility: HOSPITAL | Age: 73
End: 2018-02-08

## 2018-02-08 DIAGNOSIS — Z95.5 STENTED CORONARY ARTERY: ICD-10-CM

## 2018-02-13 ENCOUNTER — AMBULATORY - HEALTHEAST (OUTPATIENT)
Dept: CARDIOLOGY | Facility: CLINIC | Age: 73
End: 2018-02-13

## 2018-02-13 DIAGNOSIS — I48.91 ATRIAL FIBRILLATION (H): ICD-10-CM

## 2018-02-13 LAB — INR PPP: 2.6 (ref 0.9–1.1)

## 2018-02-14 ENCOUNTER — AMBULATORY - HEALTHEAST (OUTPATIENT)
Dept: CARDIOLOGY | Facility: CLINIC | Age: 73
End: 2018-02-14

## 2018-02-16 ENCOUNTER — OFFICE VISIT - HEALTHEAST (OUTPATIENT)
Dept: PULMONOLOGY | Facility: OTHER | Age: 73
End: 2018-02-16

## 2018-02-16 DIAGNOSIS — J44.9 COPD (CHRONIC OBSTRUCTIVE PULMONARY DISEASE) (H): ICD-10-CM

## 2018-02-21 ENCOUNTER — AMBULATORY - HEALTHEAST (OUTPATIENT)
Dept: CARDIOLOGY | Facility: CLINIC | Age: 73
End: 2018-02-21

## 2018-02-21 DIAGNOSIS — I48.91 ATRIAL FIBRILLATION (H): ICD-10-CM

## 2018-02-21 LAB — INR PPP: 2.6 (ref 0.9–1.1)

## 2018-02-28 ENCOUNTER — AMBULATORY - HEALTHEAST (OUTPATIENT)
Dept: CARDIOLOGY | Facility: CLINIC | Age: 73
End: 2018-02-28

## 2018-02-28 DIAGNOSIS — I48.19 PERSISTENT ATRIAL FIBRILLATION (H): ICD-10-CM

## 2018-02-28 LAB — INR PPP: 2.4 (ref 0.9–1.1)

## 2018-03-06 ENCOUNTER — RECORDS - HEALTHEAST (OUTPATIENT)
Dept: ADMINISTRATIVE | Facility: OTHER | Age: 73
End: 2018-03-06

## 2018-03-07 ENCOUNTER — AMBULATORY - HEALTHEAST (OUTPATIENT)
Dept: CARDIOLOGY | Facility: CLINIC | Age: 73
End: 2018-03-07

## 2018-03-07 DIAGNOSIS — I48.19 PERSISTENT ATRIAL FIBRILLATION (H): ICD-10-CM

## 2018-03-07 LAB — INR PPP: 2.7 (ref 0.9–1.1)

## 2018-03-14 ENCOUNTER — AMBULATORY - HEALTHEAST (OUTPATIENT)
Dept: CARDIOLOGY | Facility: CLINIC | Age: 73
End: 2018-03-14

## 2018-03-14 DIAGNOSIS — I48.19 PERSISTENT ATRIAL FIBRILLATION (H): ICD-10-CM

## 2018-03-14 LAB — INR PPP: 3.1 (ref 0.9–1.1)

## 2018-03-20 ENCOUNTER — AMBULATORY - HEALTHEAST (OUTPATIENT)
Dept: CARDIOLOGY | Facility: CLINIC | Age: 73
End: 2018-03-20

## 2018-03-20 DIAGNOSIS — I48.19 PERSISTENT ATRIAL FIBRILLATION (H): ICD-10-CM

## 2018-03-20 LAB — INR PPP: 2.1 (ref 0.9–1.1)

## 2018-03-27 ENCOUNTER — AMBULATORY - HEALTHEAST (OUTPATIENT)
Dept: CARDIOLOGY | Facility: CLINIC | Age: 73
End: 2018-03-27

## 2018-03-27 DIAGNOSIS — I48.19 PERSISTENT ATRIAL FIBRILLATION (H): ICD-10-CM

## 2018-03-27 LAB — INTERNATIONAL NORMALIZATION RATIO, POC - HISTORICAL: 2.6

## 2018-03-29 ENCOUNTER — AMBULATORY - HEALTHEAST (OUTPATIENT)
Dept: CARDIOLOGY | Facility: CLINIC | Age: 73
End: 2018-03-29

## 2018-03-29 DIAGNOSIS — Z95.810 ICD (IMPLANTABLE CARDIOVERTER-DEFIBRILLATOR), DUAL, IN SITU: ICD-10-CM

## 2018-03-29 LAB — HCC DEVICE COMMENTS: NORMAL

## 2018-04-02 ENCOUNTER — HOSPITAL ENCOUNTER (OUTPATIENT)
Dept: CT IMAGING | Facility: HOSPITAL | Age: 73
Discharge: HOME | End: 2018-04-02
Attending: INTERNAL MEDICINE

## 2018-04-02 DIAGNOSIS — J18.1 LOBAR PNEUMONIA, UNSPECIFIED ORGANISM (H): ICD-10-CM

## 2018-04-02 DIAGNOSIS — J18.9 PNEUMONIA OF LEFT LOWER LOBE DUE TO INFECTIOUS ORGANISM: ICD-10-CM

## 2018-04-03 ENCOUNTER — AMBULATORY - HEALTHEAST (OUTPATIENT)
Dept: CARDIOLOGY | Facility: CLINIC | Age: 73
End: 2018-04-03

## 2018-04-03 ENCOUNTER — RECORDS - HEALTHEAST (OUTPATIENT)
Dept: ADMINISTRATIVE | Facility: OTHER | Age: 73
End: 2018-04-03

## 2018-04-03 DIAGNOSIS — I48.19 PERSISTENT ATRIAL FIBRILLATION (H): ICD-10-CM

## 2018-04-03 LAB — INR PPP: 2.4 (ref 0.9–1.1)

## 2018-04-10 ENCOUNTER — AMBULATORY - HEALTHEAST (OUTPATIENT)
Dept: CARDIOLOGY | Facility: CLINIC | Age: 73
End: 2018-04-10

## 2018-04-10 DIAGNOSIS — I48.19 PERSISTENT ATRIAL FIBRILLATION (H): ICD-10-CM

## 2018-04-10 LAB — INR PPP: 2.3 (ref 0.9–1.1)

## 2018-04-11 ENCOUNTER — OFFICE VISIT - HEALTHEAST (OUTPATIENT)
Dept: PULMONOLOGY | Facility: OTHER | Age: 73
End: 2018-04-11

## 2018-04-11 DIAGNOSIS — J44.9 COPD, GROUP D, BY GOLD 2017 CLASSIFICATION (H): ICD-10-CM

## 2018-04-17 ENCOUNTER — AMBULATORY - HEALTHEAST (OUTPATIENT)
Dept: CARDIOLOGY | Facility: CLINIC | Age: 73
End: 2018-04-17

## 2018-04-17 DIAGNOSIS — I48.19 PERSISTENT ATRIAL FIBRILLATION (H): ICD-10-CM

## 2018-04-17 LAB — INR PPP: 2.6 (ref 0.9–1.1)

## 2018-04-24 ENCOUNTER — AMBULATORY - HEALTHEAST (OUTPATIENT)
Dept: CARDIOLOGY | Facility: CLINIC | Age: 73
End: 2018-04-24

## 2018-04-24 DIAGNOSIS — I48.19 PERSISTENT ATRIAL FIBRILLATION (H): ICD-10-CM

## 2018-04-24 LAB — INR PPP: 2.3 (ref 0.9–1.1)

## 2018-04-26 ENCOUNTER — COMMUNICATION - HEALTHEAST (OUTPATIENT)
Dept: CARDIOLOGY | Facility: CLINIC | Age: 73
End: 2018-04-26

## 2018-04-26 ENCOUNTER — AMBULATORY - HEALTHEAST (OUTPATIENT)
Dept: CARDIOLOGY | Facility: CLINIC | Age: 73
End: 2018-04-26

## 2018-04-26 DIAGNOSIS — I48.19 PERSISTENT ATRIAL FIBRILLATION (H): ICD-10-CM

## 2018-04-26 DIAGNOSIS — E83.42 HYPOMAGNESEMIA: ICD-10-CM

## 2018-05-01 ENCOUNTER — RECORDS - HEALTHEAST (OUTPATIENT)
Dept: ADMINISTRATIVE | Facility: OTHER | Age: 73
End: 2018-05-01

## 2018-05-02 ENCOUNTER — AMBULATORY - HEALTHEAST (OUTPATIENT)
Dept: CARDIOLOGY | Facility: CLINIC | Age: 73
End: 2018-05-02

## 2018-05-02 ENCOUNTER — COMMUNICATION - HEALTHEAST (OUTPATIENT)
Dept: CARDIOLOGY | Facility: CLINIC | Age: 73
End: 2018-05-02

## 2018-05-02 ENCOUNTER — RECORDS - HEALTHEAST (OUTPATIENT)
Dept: LAB | Facility: CLINIC | Age: 73
End: 2018-05-02

## 2018-05-02 DIAGNOSIS — I48.19 PERSISTENT ATRIAL FIBRILLATION (H): ICD-10-CM

## 2018-05-02 DIAGNOSIS — I48.91 A-FIB (H): ICD-10-CM

## 2018-05-02 LAB
ANION GAP SERPL CALCULATED.3IONS-SCNC: 8 MMOL/L (ref 5–18)
BUN SERPL-MCNC: 24 MG/DL (ref 8–28)
CALCIUM SERPL-MCNC: 9.2 MG/DL (ref 8.5–10.5)
CHLORIDE BLD-SCNC: 111 MMOL/L (ref 98–107)
CHOLEST SERPL-MCNC: 142 MG/DL
CO2 SERPL-SCNC: 23 MMOL/L (ref 22–31)
CREAT SERPL-MCNC: 1.11 MG/DL (ref 0.7–1.3)
FASTING STATUS PATIENT QL REPORTED: YES
GFR SERPL CREATININE-BSD FRML MDRD: >60 ML/MIN/1.73M2
GLUCOSE BLD-MCNC: 105 MG/DL (ref 70–125)
HDLC SERPL-MCNC: 53 MG/DL
INR PPP: 2.7 (ref 0.9–1.1)
LDLC SERPL CALC-MCNC: 80 MG/DL
POTASSIUM BLD-SCNC: 5.2 MMOL/L (ref 3.5–5)
SODIUM SERPL-SCNC: 142 MMOL/L (ref 136–145)
TRIGL SERPL-MCNC: 46 MG/DL

## 2018-05-07 ENCOUNTER — AMBULATORY - HEALTHEAST (OUTPATIENT)
Dept: CARDIOLOGY | Facility: CLINIC | Age: 73
End: 2018-05-07

## 2018-05-10 ENCOUNTER — AMBULATORY - HEALTHEAST (OUTPATIENT)
Dept: CARDIOLOGY | Facility: CLINIC | Age: 73
End: 2018-05-10

## 2018-05-10 DIAGNOSIS — Z95.810 ICD (IMPLANTABLE CARDIOVERTER-DEFIBRILLATOR), DUAL, IN SITU: ICD-10-CM

## 2018-05-11 ENCOUNTER — COMMUNICATION - HEALTHEAST (OUTPATIENT)
Dept: CARDIOLOGY | Facility: CLINIC | Age: 73
End: 2018-05-11

## 2018-05-11 LAB
HCC DEVICE COMMENTS: NORMAL
HCC DEVICE IMPLANTING PROVIDER: NORMAL
HCC DEVICE MANUFACTURE: NORMAL
HCC DEVICE MODEL: NORMAL
HCC DEVICE SERIAL NUMBER: NORMAL
HCC DEVICE TYPE: NORMAL

## 2018-05-14 ENCOUNTER — COMMUNICATION - HEALTHEAST (OUTPATIENT)
Dept: CARDIOLOGY | Facility: CLINIC | Age: 73
End: 2018-05-14

## 2018-05-15 ENCOUNTER — AMBULATORY - HEALTHEAST (OUTPATIENT)
Dept: CARDIOLOGY | Facility: CLINIC | Age: 73
End: 2018-05-15

## 2018-05-16 ENCOUNTER — AMBULATORY - HEALTHEAST (OUTPATIENT)
Dept: CARDIOLOGY | Facility: CLINIC | Age: 73
End: 2018-05-16

## 2018-05-16 ENCOUNTER — SURGERY - HEALTHEAST (OUTPATIENT)
Dept: CARDIOLOGY | Facility: CLINIC | Age: 73
End: 2018-05-16

## 2018-05-16 DIAGNOSIS — I48.91 ATRIAL FIBRILLATION (H): ICD-10-CM

## 2018-05-16 DIAGNOSIS — I48.19 PERSISTENT ATRIAL FIBRILLATION (H): ICD-10-CM

## 2018-05-16 LAB — INR PPP: 2.8 (ref 0.9–1.1)

## 2018-05-17 ENCOUNTER — RECORDS - HEALTHEAST (OUTPATIENT)
Dept: LAB | Facility: CLINIC | Age: 73
End: 2018-05-17

## 2018-05-17 LAB
ANION GAP SERPL CALCULATED.3IONS-SCNC: 6 MMOL/L (ref 5–18)
BUN SERPL-MCNC: 26 MG/DL (ref 8–28)
CALCIUM SERPL-MCNC: 8.9 MG/DL (ref 8.5–10.5)
CHLORIDE BLD-SCNC: 112 MMOL/L (ref 98–107)
CO2 SERPL-SCNC: 23 MMOL/L (ref 22–31)
CREAT SERPL-MCNC: 1.11 MG/DL (ref 0.7–1.3)
GFR SERPL CREATININE-BSD FRML MDRD: >60 ML/MIN/1.73M2
GLUCOSE BLD-MCNC: 125 MG/DL (ref 70–125)
POTASSIUM BLD-SCNC: 5 MMOL/L (ref 3.5–5)
SODIUM SERPL-SCNC: 141 MMOL/L (ref 136–145)

## 2018-05-22 ENCOUNTER — ANESTHESIA - HEALTHEAST (OUTPATIENT)
Dept: CARDIOLOGY | Facility: CLINIC | Age: 73
End: 2018-05-22

## 2018-05-22 ENCOUNTER — AMBULATORY - HEALTHEAST (OUTPATIENT)
Dept: CARDIOLOGY | Facility: CLINIC | Age: 73
End: 2018-05-22

## 2018-05-22 DIAGNOSIS — I48.91 ATRIAL FIBRILLATION (H): ICD-10-CM

## 2018-05-22 DIAGNOSIS — Z95.810 ICD (IMPLANTABLE CARDIOVERTER-DEFIBRILLATOR), DUAL, IN SITU: ICD-10-CM

## 2018-05-22 LAB — INR PPP: 3 (ref 0.9–1.1)

## 2018-05-24 ENCOUNTER — AMBULATORY - HEALTHEAST (OUTPATIENT)
Dept: CARDIOLOGY | Facility: CLINIC | Age: 73
End: 2018-05-24

## 2018-05-24 DIAGNOSIS — Z95.810 ICD (IMPLANTABLE CARDIOVERTER-DEFIBRILLATOR), DUAL, IN SITU: ICD-10-CM

## 2018-05-25 ENCOUNTER — COMMUNICATION - HEALTHEAST (OUTPATIENT)
Dept: CARDIOLOGY | Facility: CLINIC | Age: 73
End: 2018-05-25

## 2018-05-25 ENCOUNTER — AMBULATORY - HEALTHEAST (OUTPATIENT)
Dept: CARDIOLOGY | Facility: CLINIC | Age: 73
End: 2018-05-25

## 2018-05-25 DIAGNOSIS — I48.91 ATRIAL FIBRILLATION (H): ICD-10-CM

## 2018-05-29 ENCOUNTER — AMBULATORY - HEALTHEAST (OUTPATIENT)
Dept: CARDIOLOGY | Facility: CLINIC | Age: 73
End: 2018-05-29

## 2018-05-29 ENCOUNTER — COMMUNICATION - HEALTHEAST (OUTPATIENT)
Dept: CARDIOLOGY | Facility: CLINIC | Age: 73
End: 2018-05-29

## 2018-05-29 DIAGNOSIS — I42.9 CARDIOMYOPATHY, IDIOPATHIC (H): ICD-10-CM

## 2018-05-29 DIAGNOSIS — I42.9 IDIOPATHIC CARDIOMYOPATHY (H): ICD-10-CM

## 2018-05-29 DIAGNOSIS — E83.42 HYPOMAGNESEMIA: ICD-10-CM

## 2018-05-29 DIAGNOSIS — Z86.79 HISTORY OF SICK SINUS SYNDROME: ICD-10-CM

## 2018-05-29 DIAGNOSIS — I48.19 PERSISTENT ATRIAL FIBRILLATION (H): ICD-10-CM

## 2018-05-29 LAB — INR PPP: 3.2 (ref 0.9–1.1)

## 2018-06-05 ENCOUNTER — AMBULATORY - HEALTHEAST (OUTPATIENT)
Dept: CARDIOLOGY | Facility: CLINIC | Age: 73
End: 2018-06-05

## 2018-06-05 DIAGNOSIS — I48.19 PERSISTENT ATRIAL FIBRILLATION (H): ICD-10-CM

## 2018-06-05 LAB — INR PPP: 3.1 (ref 0.9–1.1)

## 2018-06-07 ENCOUNTER — AMBULATORY - HEALTHEAST (OUTPATIENT)
Dept: CARDIOLOGY | Facility: CLINIC | Age: 73
End: 2018-06-07

## 2018-06-07 DIAGNOSIS — I48.19 PERSISTENT ATRIAL FIBRILLATION (H): ICD-10-CM

## 2018-06-07 LAB — INR PPP: 3.1 (ref 0.9–1.1)

## 2018-06-12 ENCOUNTER — AMBULATORY - HEALTHEAST (OUTPATIENT)
Dept: CARDIOLOGY | Facility: CLINIC | Age: 73
End: 2018-06-12

## 2018-06-12 ENCOUNTER — COMMUNICATION - HEALTHEAST (OUTPATIENT)
Dept: CARDIOLOGY | Facility: CLINIC | Age: 73
End: 2018-06-12

## 2018-06-12 DIAGNOSIS — I48.19 PERSISTENT ATRIAL FIBRILLATION (H): ICD-10-CM

## 2018-06-12 DIAGNOSIS — Z95.810 ICD (IMPLANTABLE CARDIOVERTER-DEFIBRILLATOR), DUAL, IN SITU: ICD-10-CM

## 2018-06-12 DIAGNOSIS — I10 ESSENTIAL HYPERTENSION: ICD-10-CM

## 2018-06-12 DIAGNOSIS — I25.10 CORONARY ARTERY DISEASE DUE TO CALCIFIED CORONARY LESION: ICD-10-CM

## 2018-06-12 DIAGNOSIS — I25.84 CORONARY ARTERY DISEASE DUE TO CALCIFIED CORONARY LESION: ICD-10-CM

## 2018-06-12 LAB
HCC DEVICE COMMENTS: NORMAL
HCC DEVICE IMPLANTING PROVIDER: NORMAL
HCC DEVICE MANUFACTURE: NORMAL
HCC DEVICE MODEL: NORMAL
HCC DEVICE SERIAL NUMBER: NORMAL
HCC DEVICE TYPE: NORMAL
INTERNATIONAL NORMALIZATION RATIO, POC - HISTORICAL: 2.3

## 2018-06-12 ASSESSMENT — MIFFLIN-ST. JEOR: SCORE: 2085.7

## 2018-06-18 ENCOUNTER — AMBULATORY - HEALTHEAST (OUTPATIENT)
Dept: CARDIOLOGY | Facility: CLINIC | Age: 73
End: 2018-06-18

## 2018-06-18 DIAGNOSIS — I48.19 PERSISTENT ATRIAL FIBRILLATION (H): ICD-10-CM

## 2018-06-18 ASSESSMENT — MIFFLIN-ST. JEOR: SCORE: 2065.29

## 2018-06-19 ENCOUNTER — AMBULATORY - HEALTHEAST (OUTPATIENT)
Dept: CARDIOLOGY | Facility: CLINIC | Age: 73
End: 2018-06-19

## 2018-06-19 LAB
ATRIAL RATE - MUSE: 58 BPM
DIASTOLIC BLOOD PRESSURE - MUSE: NORMAL MMHG
INTERPRETATION ECG - MUSE: NORMAL
P AXIS - MUSE: NORMAL DEGREES
PR INTERVAL - MUSE: NORMAL MS
QRS DURATION - MUSE: 114 MS
QT - MUSE: 444 MS
QTC - MUSE: 465 MS
R AXIS - MUSE: 48 DEGREES
SYSTOLIC BLOOD PRESSURE - MUSE: NORMAL MMHG
T AXIS - MUSE: -67 DEGREES
VENTRICULAR RATE- MUSE: 66 BPM

## 2018-06-20 ENCOUNTER — AMBULATORY - HEALTHEAST (OUTPATIENT)
Dept: CARDIOLOGY | Facility: CLINIC | Age: 73
End: 2018-06-20

## 2018-06-20 DIAGNOSIS — I48.91 ATRIAL FIBRILLATION (H): ICD-10-CM

## 2018-06-20 LAB — INR PPP: 2 (ref 0.9–1.1)

## 2018-06-25 ENCOUNTER — COMMUNICATION - HEALTHEAST (OUTPATIENT)
Dept: CARDIOLOGY | Facility: CLINIC | Age: 73
End: 2018-06-25

## 2018-06-25 ENCOUNTER — AMBULATORY - HEALTHEAST (OUTPATIENT)
Dept: CARDIOLOGY | Facility: CLINIC | Age: 73
End: 2018-06-25

## 2018-06-25 DIAGNOSIS — Z95.810 ICD (IMPLANTABLE CARDIOVERTER-DEFIBRILLATOR), DUAL, IN SITU: ICD-10-CM

## 2018-06-26 ENCOUNTER — AMBULATORY - HEALTHEAST (OUTPATIENT)
Dept: CARDIOLOGY | Facility: CLINIC | Age: 73
End: 2018-06-26

## 2018-06-26 DIAGNOSIS — I48.19 PERSISTENT ATRIAL FIBRILLATION (H): ICD-10-CM

## 2018-06-26 LAB — INR PPP: 2.3 (ref 0.9–1.1)

## 2018-07-02 ENCOUNTER — COMMUNICATION - HEALTHEAST (OUTPATIENT)
Dept: CARDIOLOGY | Facility: CLINIC | Age: 73
End: 2018-07-02

## 2018-07-03 ENCOUNTER — AMBULATORY - HEALTHEAST (OUTPATIENT)
Dept: CARDIOLOGY | Facility: CLINIC | Age: 73
End: 2018-07-03

## 2018-07-03 DIAGNOSIS — I48.91 ATRIAL FIBRILLATION (H): ICD-10-CM

## 2018-07-03 LAB — INR PPP: 2.1 (ref 0.9–1.1)

## 2018-07-05 ENCOUNTER — AMBULATORY - HEALTHEAST (OUTPATIENT)
Dept: CARDIOLOGY | Facility: CLINIC | Age: 73
End: 2018-07-05

## 2018-07-06 ENCOUNTER — AMBULATORY - HEALTHEAST (OUTPATIENT)
Dept: CARDIOLOGY | Facility: CLINIC | Age: 73
End: 2018-07-06

## 2018-07-06 ENCOUNTER — COMMUNICATION - HEALTHEAST (OUTPATIENT)
Dept: CARDIOLOGY | Facility: CLINIC | Age: 73
End: 2018-07-06

## 2018-07-06 DIAGNOSIS — E83.42 HYPOMAGNESEMIA: ICD-10-CM

## 2018-07-06 DIAGNOSIS — Z98.890 STATUS POST CATHETER ABLATION OF ATRIAL FIBRILLATION: ICD-10-CM

## 2018-07-06 DIAGNOSIS — I48.19 PERSISTENT ATRIAL FIBRILLATION (H): ICD-10-CM

## 2018-07-06 DIAGNOSIS — Z95.810 ICD (IMPLANTABLE CARDIOVERTER-DEFIBRILLATOR), DUAL, IN SITU: ICD-10-CM

## 2018-07-06 DIAGNOSIS — E78.5 DYSLIPIDEMIA, GOAL LDL BELOW 70: ICD-10-CM

## 2018-07-06 DIAGNOSIS — I25.84 CORONARY ARTERY DISEASE DUE TO CALCIFIED CORONARY LESION: ICD-10-CM

## 2018-07-06 DIAGNOSIS — I25.10 CORONARY ARTERY DISEASE DUE TO CALCIFIED CORONARY LESION: ICD-10-CM

## 2018-07-06 LAB
ALBUMIN SERPL-MCNC: 3.7 G/DL (ref 3.5–5)
ALP SERPL-CCNC: 53 U/L (ref 45–120)
ALT SERPL W P-5'-P-CCNC: 19 U/L (ref 0–45)
ANION GAP SERPL CALCULATED.3IONS-SCNC: 8 MMOL/L (ref 5–18)
AST SERPL W P-5'-P-CCNC: 21 U/L (ref 0–40)
BILIRUB SERPL-MCNC: 0.9 MG/DL (ref 0–1)
BUN SERPL-MCNC: 24 MG/DL (ref 8–28)
CALCIUM SERPL-MCNC: 9.4 MG/DL (ref 8.5–10.5)
CHLORIDE BLD-SCNC: 109 MMOL/L (ref 98–107)
CO2 SERPL-SCNC: 25 MMOL/L (ref 22–31)
CREAT SERPL-MCNC: 1.17 MG/DL (ref 0.7–1.3)
GFR SERPL CREATININE-BSD FRML MDRD: >60 ML/MIN/1.73M2
GLUCOSE BLD-MCNC: 91 MG/DL (ref 70–125)
HCC DEVICE COMMENTS: NORMAL
HCC DEVICE IMPLANTING PROVIDER: NORMAL
HCC DEVICE MANUFACTURE: NORMAL
HCC DEVICE MODEL: NORMAL
HCC DEVICE SERIAL NUMBER: NORMAL
HCC DEVICE TYPE: NORMAL
MAGNESIUM SERPL-MCNC: 2.5 MG/DL (ref 1.8–2.6)
POTASSIUM BLD-SCNC: 4.8 MMOL/L (ref 3.5–5)
PROT SERPL-MCNC: 6.5 G/DL (ref 6–8)
SODIUM SERPL-SCNC: 142 MMOL/L (ref 136–145)
TSH SERPL DL<=0.005 MIU/L-ACNC: 1.36 UIU/ML (ref 0.3–5)

## 2018-07-06 ASSESSMENT — MIFFLIN-ST. JEOR: SCORE: 2078.89

## 2018-07-09 ENCOUNTER — AMBULATORY - HEALTHEAST (OUTPATIENT)
Dept: CARDIOLOGY | Facility: CLINIC | Age: 73
End: 2018-07-09

## 2018-07-10 ENCOUNTER — AMBULATORY - HEALTHEAST (OUTPATIENT)
Dept: CARDIOLOGY | Facility: CLINIC | Age: 73
End: 2018-07-10

## 2018-07-10 DIAGNOSIS — I48.19 PERSISTENT ATRIAL FIBRILLATION (H): ICD-10-CM

## 2018-07-10 LAB — INR PPP: 2.4 (ref 0.9–1.1)

## 2018-07-11 ENCOUNTER — AMBULATORY - HEALTHEAST (OUTPATIENT)
Dept: CARDIOLOGY | Facility: CLINIC | Age: 73
End: 2018-07-11

## 2018-07-11 ENCOUNTER — COMMUNICATION - HEALTHEAST (OUTPATIENT)
Dept: CARDIOLOGY | Facility: CLINIC | Age: 73
End: 2018-07-11

## 2018-07-11 ENCOUNTER — ANESTHESIA - HEALTHEAST (OUTPATIENT)
Dept: CARDIOLOGY | Facility: CLINIC | Age: 73
End: 2018-07-11

## 2018-07-11 DIAGNOSIS — I48.91 AF (ATRIAL FIBRILLATION) (H): ICD-10-CM

## 2018-07-11 DIAGNOSIS — R06.02 SOB (SHORTNESS OF BREATH): ICD-10-CM

## 2018-07-17 ENCOUNTER — AMBULATORY - HEALTHEAST (OUTPATIENT)
Dept: CARDIOLOGY | Facility: CLINIC | Age: 73
End: 2018-07-17

## 2018-07-17 DIAGNOSIS — I48.91 ATRIAL FIBRILLATION (H): ICD-10-CM

## 2018-07-17 LAB — INR PPP: 2.2 (ref 0.9–1.1)

## 2018-07-19 ENCOUNTER — HOSPITAL ENCOUNTER (OUTPATIENT)
Dept: NUCLEAR MEDICINE | Facility: HOSPITAL | Age: 73
Discharge: HOME | End: 2018-07-19
Attending: INTERNAL MEDICINE

## 2018-07-19 ENCOUNTER — COMMUNICATION - HEALTHEAST (OUTPATIENT)
Dept: CARDIOLOGY | Facility: CLINIC | Age: 73
End: 2018-07-19

## 2018-07-19 ENCOUNTER — COMMUNICATION - HEALTHEAST (OUTPATIENT)
Dept: CARDIOLOGY | Facility: HOSPITAL | Age: 73
End: 2018-07-19

## 2018-07-19 ENCOUNTER — HOSPITAL ENCOUNTER (OUTPATIENT)
Dept: CARDIOLOGY | Facility: HOSPITAL | Age: 73
Discharge: HOME | End: 2018-07-19
Attending: INTERNAL MEDICINE

## 2018-07-19 LAB
CV STRESS CURRENT BP HE: NORMAL
CV STRESS CURRENT HR HE: 63
CV STRESS CURRENT HR HE: 64
CV STRESS CURRENT HR HE: 65
CV STRESS CURRENT HR HE: 69
CV STRESS CURRENT HR HE: 69
CV STRESS CURRENT HR HE: 70
CV STRESS CURRENT HR HE: 71
CV STRESS CURRENT HR HE: 73
CV STRESS CURRENT HR HE: 76
CV STRESS DEVIATION TIME HE: NORMAL
CV STRESS ECHO PERCENT HR HE: NORMAL
CV STRESS EXERCISE STAGE HE: NORMAL
CV STRESS FINAL RESTING BP HE: NORMAL
CV STRESS FINAL RESTING HR HE: 69
CV STRESS MAX HR HE: 77
CV STRESS MAX TREADMILL GRADE HE: 0
CV STRESS MAX TREADMILL SPEED HE: 0
CV STRESS PEAK DIA BP HE: NORMAL
CV STRESS PEAK SYS BP HE: NORMAL
CV STRESS PHASE HE: NORMAL
CV STRESS PROTOCOL HE: NORMAL
CV STRESS RESTING PT POSITION HE: NORMAL
CV STRESS ST DEVIATION AMOUNT HE: NORMAL
CV STRESS ST DEVIATION ELEVATION HE: NORMAL
CV STRESS ST EVELATION AMOUNT HE: NORMAL
CV STRESS TEST TYPE HE: NORMAL
CV STRESS TOTAL STAGE TIME MIN 1 HE: NORMAL
NUC STRESS EJECTION FRACTION: 61 %
STRESS ECHO BASELINE BP: NORMAL
STRESS ECHO BASELINE HR: 64
STRESS ECHO CALCULATED PERCENT HR: 52 %
STRESS ECHO LAST STRESS BP: NORMAL
STRESS ECHO LAST STRESS HR: 73

## 2018-07-24 ENCOUNTER — AMBULATORY - HEALTHEAST (OUTPATIENT)
Dept: CARDIOLOGY | Facility: CLINIC | Age: 73
End: 2018-07-24

## 2018-07-24 DIAGNOSIS — I48.91 ATRIAL FIBRILLATION (H): ICD-10-CM

## 2018-07-24 LAB — INR PPP: 2.3 (ref 0.9–1.1)

## 2018-07-31 ENCOUNTER — AMBULATORY - HEALTHEAST (OUTPATIENT)
Dept: CARDIOLOGY | Facility: CLINIC | Age: 73
End: 2018-07-31

## 2018-07-31 DIAGNOSIS — I48.91 ATRIAL FIBRILLATION (H): ICD-10-CM

## 2018-07-31 LAB — INR PPP: 1.8 (ref 0.9–1.1)

## 2018-08-07 ENCOUNTER — AMBULATORY - HEALTHEAST (OUTPATIENT)
Dept: CARDIOLOGY | Facility: CLINIC | Age: 73
End: 2018-08-07

## 2018-08-07 DIAGNOSIS — I48.91 ATRIAL FIBRILLATION (H): ICD-10-CM

## 2018-08-07 LAB — INR PPP: 2.8 (ref 0.9–1.1)

## 2018-08-14 ENCOUNTER — AMBULATORY - HEALTHEAST (OUTPATIENT)
Dept: CARDIOLOGY | Facility: CLINIC | Age: 73
End: 2018-08-14

## 2018-08-14 DIAGNOSIS — I48.19 PERSISTENT ATRIAL FIBRILLATION (H): ICD-10-CM

## 2018-08-14 DIAGNOSIS — I25.84 CORONARY ARTERY DISEASE DUE TO CALCIFIED CORONARY LESION: ICD-10-CM

## 2018-08-14 DIAGNOSIS — I48.91 ATRIAL FIBRILLATION (H): ICD-10-CM

## 2018-08-14 DIAGNOSIS — I10 ESSENTIAL HYPERTENSION: ICD-10-CM

## 2018-08-14 DIAGNOSIS — I25.10 CORONARY ARTERY DISEASE DUE TO CALCIFIED CORONARY LESION: ICD-10-CM

## 2018-08-14 DIAGNOSIS — Z95.810 ICD (IMPLANTABLE CARDIOVERTER-DEFIBRILLATOR), DUAL, IN SITU: ICD-10-CM

## 2018-08-14 LAB
HCC DEVICE COMMENTS: NORMAL
HCC DEVICE IMPLANTING PROVIDER: NORMAL
HCC DEVICE MANUFACTURE: NORMAL
HCC DEVICE MODEL: NORMAL
HCC DEVICE SERIAL NUMBER: NORMAL
HCC DEVICE TYPE: NORMAL
INR PPP: 2.8 (ref 0.9–1.1)

## 2018-08-14 ASSESSMENT — MIFFLIN-ST. JEOR: SCORE: 2074.36

## 2018-08-15 ENCOUNTER — COMMUNICATION - HEALTHEAST (OUTPATIENT)
Dept: CARDIOLOGY | Facility: CLINIC | Age: 73
End: 2018-08-15

## 2018-08-15 DIAGNOSIS — I48.19 PERSISTENT ATRIAL FIBRILLATION (H): ICD-10-CM

## 2018-08-21 ENCOUNTER — AMBULATORY - HEALTHEAST (OUTPATIENT)
Dept: CARDIOLOGY | Facility: CLINIC | Age: 73
End: 2018-08-21

## 2018-08-21 DIAGNOSIS — I48.91 ATRIAL FIBRILLATION (H): ICD-10-CM

## 2018-08-21 LAB — INTERNATIONAL NORMALIZATION RATIO, POC - HISTORICAL: 2.8

## 2018-08-22 ENCOUNTER — AMBULATORY - HEALTHEAST (OUTPATIENT)
Dept: CARDIOLOGY | Facility: CLINIC | Age: 73
End: 2018-08-22

## 2018-08-22 ENCOUNTER — COMMUNICATION - HEALTHEAST (OUTPATIENT)
Dept: CARDIOLOGY | Facility: CLINIC | Age: 73
End: 2018-08-22

## 2018-08-22 ENCOUNTER — SURGERY - HEALTHEAST (OUTPATIENT)
Dept: CARDIOLOGY | Facility: CLINIC | Age: 73
End: 2018-08-22

## 2018-08-22 DIAGNOSIS — Z00.6 RESEARCH EXAM: ICD-10-CM

## 2018-08-22 DIAGNOSIS — I48.91 AF (ATRIAL FIBRILLATION) (H): ICD-10-CM

## 2018-08-22 DIAGNOSIS — Z00.6 CLINICAL TRIAL PARTICIPANT: ICD-10-CM

## 2018-08-22 DIAGNOSIS — I48.19 PERSISTENT ATRIAL FIBRILLATION (H): ICD-10-CM

## 2018-08-22 DIAGNOSIS — I48.91 A-FIB (H): ICD-10-CM

## 2018-08-28 ENCOUNTER — AMBULATORY - HEALTHEAST (OUTPATIENT)
Dept: CARDIOLOGY | Facility: CLINIC | Age: 73
End: 2018-08-28

## 2018-08-28 DIAGNOSIS — I48.21 PERMANENT ATRIAL FIBRILLATION (H): ICD-10-CM

## 2018-08-28 LAB — INR PPP: 2.2 (ref 0.9–1.1)

## 2018-08-29 ENCOUNTER — AMBULATORY - HEALTHEAST (OUTPATIENT)
Dept: CARDIOLOGY | Facility: CLINIC | Age: 73
End: 2018-08-29

## 2018-08-29 ENCOUNTER — COMMUNICATION - HEALTHEAST (OUTPATIENT)
Dept: CARDIOLOGY | Facility: CLINIC | Age: 73
End: 2018-08-29

## 2018-08-29 ENCOUNTER — HOSPITAL ENCOUNTER (OUTPATIENT)
Dept: CARDIOLOGY | Facility: CLINIC | Age: 73
Discharge: HOME | End: 2018-08-29
Attending: INTERNAL MEDICINE

## 2018-08-29 DIAGNOSIS — I48.19 PERSISTENT ATRIAL FIBRILLATION (H): ICD-10-CM

## 2018-08-29 DIAGNOSIS — Z00.6 CLINICAL TRIAL PARTICIPANT: ICD-10-CM

## 2018-08-29 DIAGNOSIS — Z95.810 ICD (IMPLANTABLE CARDIOVERTER-DEFIBRILLATOR) IN PLACE: ICD-10-CM

## 2018-08-29 LAB
BSA FOR ECHO PROCEDURE: 2.58 M2
CV BLOOD PRESSURE: NORMAL MMHG
CV ECHO HEIGHT: 74 IN
CV ECHO WEIGHT: 282 LBS
ECHO EJECTION FRACTION ESTIMATED: 55 %
EJECTION FRACTION: 61 % (ref 55–75)
FRACTIONAL SHORTENING: 26.7 % (ref 28–44)
HCC DEVICE COMMENTS: NORMAL
HCC DEVICE IMPLANTING PROVIDER: NORMAL
HCC DEVICE MANUFACTURE: NORMAL
HCC DEVICE MODEL: NORMAL
HCC DEVICE SERIAL NUMBER: NORMAL
HCC DEVICE TYPE: NORMAL
INTERVENTRICULAR SEPTUM IN END DIASTOLE: 1.2 CM (ref 0.6–1)
IVS/PW RATIO: 1
LEFT VENTRICLE DIASTOLIC VOLUME INDEX: 55 CM3/M2 (ref 34–74)
LEFT VENTRICLE DIASTOLIC VOLUME: 142 CM3 (ref 62–150)
LEFT VENTRICLE HEART RATE: 72 BPM
LEFT VENTRICLE MASS INDEX: 121.7 G/M2
LEFT VENTRICLE SYSTOLIC VOLUME INDEX: 21.7 CM3/M2 (ref 11–31)
LEFT VENTRICLE SYSTOLIC VOLUME: 56 CM3 (ref 21–61)
LEFT VENTRICULAR INTERNAL DIMENSION IN DIASTOLE: 6 CM (ref 4.2–5.8)
LEFT VENTRICULAR INTERNAL DIMENSION IN SYSTOLE: 4.4 CM (ref 2.5–4)
LEFT VENTRICULAR MASS: 314 G
LEFT VENTRICULAR POSTERIOR WALL IN END DIASTOLE: 1.2 CM (ref 0.6–1)
NUC REST DIASTOLIC VOLUME INDEX: 4512 LBS
NUC REST SYSTOLIC VOLUME INDEX: 74 IN
PROTEIN, RANDOM URINE - HISTORICAL: <7 MG/DL
TRICUSPID VALVE ANULAR PLANE SYSTOLIC EXCURSION: 2.1 CM

## 2018-08-29 ASSESSMENT — MIFFLIN-ST. JEOR
SCORE: 2078.89
SCORE: 2078.89

## 2018-08-30 ENCOUNTER — SURGERY - HEALTHEAST (OUTPATIENT)
Dept: CARDIOLOGY | Facility: CLINIC | Age: 73
End: 2018-08-30

## 2018-08-30 ENCOUNTER — AMBULATORY - HEALTHEAST (OUTPATIENT)
Dept: CARDIOLOGY | Facility: CLINIC | Age: 73
End: 2018-08-30

## 2018-08-30 ENCOUNTER — ANESTHESIA - HEALTHEAST (OUTPATIENT)
Dept: CARDIOLOGY | Facility: CLINIC | Age: 73
End: 2018-08-30

## 2018-08-30 DIAGNOSIS — Z95.810 ICD (IMPLANTABLE CARDIOVERTER-DEFIBRILLATOR) IN PLACE: ICD-10-CM

## 2018-08-30 ASSESSMENT — MIFFLIN-ST. JEOR: SCORE: 2046.69

## 2018-09-04 ENCOUNTER — AMBULATORY - HEALTHEAST (OUTPATIENT)
Dept: CARDIOLOGY | Facility: CLINIC | Age: 73
End: 2018-09-04

## 2018-09-04 ENCOUNTER — COMMUNICATION - HEALTHEAST (OUTPATIENT)
Dept: CARDIOLOGY | Facility: CLINIC | Age: 73
End: 2018-09-04

## 2018-09-05 ENCOUNTER — COMMUNICATION - HEALTHEAST (OUTPATIENT)
Dept: CARDIOLOGY | Facility: CLINIC | Age: 73
End: 2018-09-05

## 2018-09-05 ENCOUNTER — AMBULATORY - HEALTHEAST (OUTPATIENT)
Dept: CARDIOLOGY | Facility: CLINIC | Age: 73
End: 2018-09-05

## 2018-09-05 DIAGNOSIS — I48.91 ATRIAL FIBRILLATION (H): ICD-10-CM

## 2018-09-05 DIAGNOSIS — I48.91 A-FIB (H): ICD-10-CM

## 2018-09-05 LAB — INR PPP: 2.3 (ref 0.9–1.1)

## 2018-09-12 ENCOUNTER — AMBULATORY - HEALTHEAST (OUTPATIENT)
Dept: CARDIOLOGY | Facility: CLINIC | Age: 73
End: 2018-09-12

## 2018-09-12 DIAGNOSIS — I48.91 ATRIAL FIBRILLATION (H): ICD-10-CM

## 2018-09-12 LAB — INR PPP: 2.3 (ref 0.9–1.1)

## 2018-09-18 ENCOUNTER — AMBULATORY - HEALTHEAST (OUTPATIENT)
Dept: CARDIOLOGY | Facility: CLINIC | Age: 73
End: 2018-09-18

## 2018-09-18 DIAGNOSIS — I48.91 ATRIAL FIBRILLATION (H): ICD-10-CM

## 2018-09-18 LAB — INR PPP: 2.8 (ref 0.9–1.1)

## 2018-09-25 ENCOUNTER — COMMUNICATION - HEALTHEAST (OUTPATIENT)
Dept: CARDIOLOGY | Facility: CLINIC | Age: 73
End: 2018-09-25

## 2018-09-25 ENCOUNTER — AMBULATORY - HEALTHEAST (OUTPATIENT)
Dept: CARDIOLOGY | Facility: CLINIC | Age: 73
End: 2018-09-25

## 2018-09-25 DIAGNOSIS — I48.91 ATRIAL FIBRILLATION (H): ICD-10-CM

## 2018-09-25 LAB — INTERNATIONAL NORMALIZATION RATIO, POC - HISTORICAL: 3.2

## 2018-10-03 ENCOUNTER — COMMUNICATION - HEALTHEAST (OUTPATIENT)
Dept: CARDIOLOGY | Facility: CLINIC | Age: 73
End: 2018-10-03

## 2018-10-03 DIAGNOSIS — I48.91 A-FIB (H): ICD-10-CM

## 2018-10-04 ENCOUNTER — AMBULATORY - HEALTHEAST (OUTPATIENT)
Dept: CARDIOLOGY | Facility: CLINIC | Age: 73
End: 2018-10-04

## 2018-10-04 ENCOUNTER — COMMUNICATION - HEALTHEAST (OUTPATIENT)
Dept: CARDIOLOGY | Facility: CLINIC | Age: 73
End: 2018-10-04

## 2018-10-04 DIAGNOSIS — I48.91 ATRIAL FIBRILLATION (H): ICD-10-CM

## 2018-10-04 LAB — INR PPP: 3.2 (ref 0.9–1.1)

## 2018-10-09 ENCOUNTER — AMBULATORY - HEALTHEAST (OUTPATIENT)
Dept: CARDIOLOGY | Facility: CLINIC | Age: 73
End: 2018-10-09

## 2018-10-09 DIAGNOSIS — I48.91 ATRIAL FIBRILLATION (H): ICD-10-CM

## 2018-10-09 LAB — INR PPP: 3 (ref 0.9–1.1)

## 2018-10-16 ENCOUNTER — AMBULATORY - HEALTHEAST (OUTPATIENT)
Dept: CARDIOLOGY | Facility: CLINIC | Age: 73
End: 2018-10-16

## 2018-10-18 ENCOUNTER — AMBULATORY - HEALTHEAST (OUTPATIENT)
Dept: CARDIOLOGY | Facility: CLINIC | Age: 73
End: 2018-10-18

## 2018-10-18 ENCOUNTER — COMMUNICATION - HEALTHEAST (OUTPATIENT)
Dept: CARDIOLOGY | Facility: CLINIC | Age: 73
End: 2018-10-18

## 2018-10-18 DIAGNOSIS — I48.19 PERSISTENT ATRIAL FIBRILLATION (H): ICD-10-CM

## 2018-10-18 DIAGNOSIS — I48.91 ATRIAL FIBRILLATION (H): ICD-10-CM

## 2018-10-18 LAB — INR PPP: 2 (ref 0.9–1.1)

## 2018-10-23 ENCOUNTER — AMBULATORY - HEALTHEAST (OUTPATIENT)
Dept: CARDIOLOGY | Facility: CLINIC | Age: 73
End: 2018-10-23

## 2018-10-23 DIAGNOSIS — I48.91 ATRIAL FIBRILLATION (H): ICD-10-CM

## 2018-10-23 LAB — INR PPP: 2.8 (ref 0.9–1.1)

## 2018-10-25 ENCOUNTER — OFFICE VISIT - HEALTHEAST (OUTPATIENT)
Dept: PULMONOLOGY | Facility: OTHER | Age: 73
End: 2018-10-25

## 2018-10-25 DIAGNOSIS — J44.9 CHRONIC OBSTRUCTIVE PULMONARY DISEASE, UNSPECIFIED COPD TYPE (H): ICD-10-CM

## 2018-10-26 ENCOUNTER — RECORDS - HEALTHEAST (OUTPATIENT)
Dept: ADMINISTRATIVE | Facility: OTHER | Age: 73
End: 2018-10-26

## 2018-10-26 ENCOUNTER — AMBULATORY - HEALTHEAST (OUTPATIENT)
Dept: CARDIOLOGY | Facility: CLINIC | Age: 73
End: 2018-10-26

## 2018-10-29 ENCOUNTER — AMBULATORY - HEALTHEAST (OUTPATIENT)
Dept: CARDIOLOGY | Facility: CLINIC | Age: 73
End: 2018-10-29

## 2018-10-30 ENCOUNTER — AMBULATORY - HEALTHEAST (OUTPATIENT)
Dept: CARDIOLOGY | Facility: CLINIC | Age: 73
End: 2018-10-30

## 2018-10-30 DIAGNOSIS — I48.91 ATRIAL FIBRILLATION (H): ICD-10-CM

## 2018-10-30 DIAGNOSIS — Z95.810 ICD (IMPLANTABLE CARDIOVERTER-DEFIBRILLATOR), DUAL, IN SITU: ICD-10-CM

## 2018-10-30 DIAGNOSIS — I48.19 PERSISTENT ATRIAL FIBRILLATION (H): ICD-10-CM

## 2018-10-30 DIAGNOSIS — I10 ESSENTIAL HYPERTENSION: ICD-10-CM

## 2018-10-30 DIAGNOSIS — I48.91 A-FIB (H): ICD-10-CM

## 2018-10-30 LAB
HCC DEVICE COMMENTS: NORMAL
HCC DEVICE IMPLANTING PROVIDER: NORMAL
HCC DEVICE MANUFACTURE: NORMAL
HCC DEVICE MODEL: NORMAL
HCC DEVICE SERIAL NUMBER: NORMAL
HCC DEVICE TYPE: NORMAL
INTERNATIONAL NORMALIZATION RATIO, POC - HISTORICAL: 2.2

## 2018-10-30 ASSESSMENT — MIFFLIN-ST. JEOR: SCORE: 2053.95

## 2018-10-31 LAB
ATRIAL RATE - MUSE: 66 BPM
DIASTOLIC BLOOD PRESSURE - MUSE: NORMAL MMHG
INTERPRETATION ECG - MUSE: NORMAL
P AXIS - MUSE: NORMAL DEGREES
PR INTERVAL - MUSE: 256 MS
QRS DURATION - MUSE: 106 MS
QT - MUSE: 494 MS
QTC - MUSE: 494 MS
R AXIS - MUSE: -10 DEGREES
SYSTOLIC BLOOD PRESSURE - MUSE: NORMAL MMHG
T AXIS - MUSE: 20 DEGREES
VENTRICULAR RATE- MUSE: 60 BPM

## 2018-11-06 ENCOUNTER — AMBULATORY - HEALTHEAST (OUTPATIENT)
Dept: CARDIOLOGY | Facility: CLINIC | Age: 73
End: 2018-11-06

## 2018-11-06 DIAGNOSIS — I48.91 ATRIAL FIBRILLATION (H): ICD-10-CM

## 2018-11-06 LAB — INR PPP: 2.9 (ref 0.9–1.1)

## 2018-11-13 ENCOUNTER — AMBULATORY - HEALTHEAST (OUTPATIENT)
Dept: CARDIOLOGY | Facility: CLINIC | Age: 73
End: 2018-11-13

## 2018-11-13 DIAGNOSIS — I48.91 ATRIAL FIBRILLATION (H): ICD-10-CM

## 2018-11-13 LAB — INR PPP: 2.8 (ref 0.9–1.1)

## 2018-11-20 ENCOUNTER — AMBULATORY - HEALTHEAST (OUTPATIENT)
Dept: CARDIOLOGY | Facility: CLINIC | Age: 73
End: 2018-11-20

## 2018-11-20 DIAGNOSIS — I48.91 ATRIAL FIBRILLATION (H): ICD-10-CM

## 2018-11-20 LAB — INR PPP: 2.4 (ref 0.9–1.1)

## 2018-11-27 ENCOUNTER — AMBULATORY - HEALTHEAST (OUTPATIENT)
Dept: CARDIOLOGY | Facility: CLINIC | Age: 73
End: 2018-11-27

## 2018-11-27 ENCOUNTER — COMMUNICATION - HEALTHEAST (OUTPATIENT)
Dept: CARDIOLOGY | Facility: CLINIC | Age: 73
End: 2018-11-27

## 2018-11-27 DIAGNOSIS — I42.9 CARDIOMYOPATHY, IDIOPATHIC (H): ICD-10-CM

## 2018-11-27 DIAGNOSIS — I48.19 PERSISTENT ATRIAL FIBRILLATION (H): ICD-10-CM

## 2018-11-27 DIAGNOSIS — I48.91 ATRIAL FIBRILLATION (H): ICD-10-CM

## 2018-11-27 DIAGNOSIS — Z86.79 HISTORY OF SICK SINUS SYNDROME: ICD-10-CM

## 2018-11-27 DIAGNOSIS — I42.9 IDIOPATHIC CARDIOMYOPATHY (H): ICD-10-CM

## 2018-11-27 LAB — INR PPP: 2.3 (ref 0.9–1.1)

## 2018-12-04 ENCOUNTER — RECORDS - HEALTHEAST (OUTPATIENT)
Dept: LAB | Facility: CLINIC | Age: 73
End: 2018-12-04

## 2018-12-04 ENCOUNTER — AMBULATORY - HEALTHEAST (OUTPATIENT)
Dept: CARDIOLOGY | Facility: CLINIC | Age: 73
End: 2018-12-04

## 2018-12-04 DIAGNOSIS — I48.91 ATRIAL FIBRILLATION (H): ICD-10-CM

## 2018-12-04 LAB
ANION GAP SERPL CALCULATED.3IONS-SCNC: 9 MMOL/L (ref 5–18)
BUN SERPL-MCNC: 20 MG/DL (ref 8–28)
CALCIUM SERPL-MCNC: 8.9 MG/DL (ref 8.5–10.5)
CHLORIDE BLD-SCNC: 110 MMOL/L (ref 98–107)
CHOLEST SERPL-MCNC: 133 MG/DL
CO2 SERPL-SCNC: 24 MMOL/L (ref 22–31)
CREAT SERPL-MCNC: 0.93 MG/DL (ref 0.7–1.3)
FASTING STATUS PATIENT QL REPORTED: NORMAL
GFR SERPL CREATININE-BSD FRML MDRD: >60 ML/MIN/1.73M2
GLUCOSE BLD-MCNC: 106 MG/DL (ref 70–125)
HDLC SERPL-MCNC: 52 MG/DL
INR PPP: 2.8 (ref 0.9–1.1)
LDLC SERPL CALC-MCNC: 69 MG/DL
POTASSIUM BLD-SCNC: 4.5 MMOL/L (ref 3.5–5)
SODIUM SERPL-SCNC: 143 MMOL/L (ref 136–145)
TRIGL SERPL-MCNC: 59 MG/DL

## 2018-12-05 ENCOUNTER — AMBULATORY - HEALTHEAST (OUTPATIENT)
Dept: CARDIOLOGY | Facility: CLINIC | Age: 73
End: 2018-12-05

## 2018-12-11 ENCOUNTER — AMBULATORY - HEALTHEAST (OUTPATIENT)
Dept: CARDIOLOGY | Facility: CLINIC | Age: 73
End: 2018-12-11

## 2018-12-11 DIAGNOSIS — I48.91 ATRIAL FIBRILLATION (H): ICD-10-CM

## 2018-12-11 LAB — INR PPP: 1.9 (ref 0.9–1.1)

## 2018-12-18 ENCOUNTER — AMBULATORY - HEALTHEAST (OUTPATIENT)
Dept: CARDIOLOGY | Facility: CLINIC | Age: 73
End: 2018-12-18

## 2018-12-18 DIAGNOSIS — I48.91 ATRIAL FIBRILLATION (H): ICD-10-CM

## 2018-12-18 LAB — INTERNATIONAL NORMALIZATION RATIO, POC - HISTORICAL: 2

## 2018-12-26 ENCOUNTER — AMBULATORY - HEALTHEAST (OUTPATIENT)
Dept: CARDIOLOGY | Facility: CLINIC | Age: 73
End: 2018-12-26

## 2018-12-26 DIAGNOSIS — I48.91 ATRIAL FIBRILLATION (H): ICD-10-CM

## 2018-12-26 LAB — INR PPP: 2.1 (ref 0.9–1.1)

## 2019-01-08 ENCOUNTER — AMBULATORY - HEALTHEAST (OUTPATIENT)
Dept: CARDIOLOGY | Facility: CLINIC | Age: 74
End: 2019-01-08

## 2019-01-08 DIAGNOSIS — I48.91 ATRIAL FIBRILLATION (H): ICD-10-CM

## 2019-01-08 LAB — INR PPP: 2.5 (ref 0.9–1.1)

## 2019-01-15 ENCOUNTER — AMBULATORY - HEALTHEAST (OUTPATIENT)
Dept: CARDIOLOGY | Facility: CLINIC | Age: 74
End: 2019-01-15

## 2019-01-15 DIAGNOSIS — I48.91 ATRIAL FIBRILLATION (H): ICD-10-CM

## 2019-01-15 LAB — INR PPP: 2.6 (ref 0.9–1.1)

## 2019-01-22 ENCOUNTER — AMBULATORY - HEALTHEAST (OUTPATIENT)
Dept: CARDIOLOGY | Facility: CLINIC | Age: 74
End: 2019-01-22

## 2019-01-22 DIAGNOSIS — I48.91 ATRIAL FIBRILLATION (H): ICD-10-CM

## 2019-01-22 LAB — INR PPP: 1.6 (ref 0.9–1.1)

## 2019-01-29 ENCOUNTER — AMBULATORY - HEALTHEAST (OUTPATIENT)
Dept: CARDIOLOGY | Facility: CLINIC | Age: 74
End: 2019-01-29

## 2019-01-29 DIAGNOSIS — I48.91 ATRIAL FIBRILLATION (H): ICD-10-CM

## 2019-01-29 LAB — INR PPP: 1.8 (ref 0.9–1.1)

## 2019-01-30 ENCOUNTER — AMBULATORY - HEALTHEAST (OUTPATIENT)
Dept: CARDIOLOGY | Facility: CLINIC | Age: 74
End: 2019-01-30

## 2019-01-30 DIAGNOSIS — Z95.810 ICD (IMPLANTABLE CARDIOVERTER-DEFIBRILLATOR) IN PLACE: ICD-10-CM

## 2019-02-05 ENCOUNTER — AMBULATORY - HEALTHEAST (OUTPATIENT)
Dept: CARDIOLOGY | Facility: CLINIC | Age: 74
End: 2019-02-05

## 2019-02-05 DIAGNOSIS — I48.91 ATRIAL FIBRILLATION (H): ICD-10-CM

## 2019-02-05 LAB — INR PPP: 2.1 (ref 0.9–1.1)

## 2019-02-12 ENCOUNTER — AMBULATORY - HEALTHEAST (OUTPATIENT)
Dept: CARDIOLOGY | Facility: CLINIC | Age: 74
End: 2019-02-12

## 2019-02-12 DIAGNOSIS — I48.91 ATRIAL FIBRILLATION (H): ICD-10-CM

## 2019-02-12 LAB — INR PPP: 3 (ref 0.9–1.1)

## 2019-02-19 ENCOUNTER — AMBULATORY - HEALTHEAST (OUTPATIENT)
Dept: CARDIOLOGY | Facility: CLINIC | Age: 74
End: 2019-02-19

## 2019-02-19 DIAGNOSIS — I48.91 ATRIAL FIBRILLATION (H): ICD-10-CM

## 2019-02-19 LAB — INR PPP: 4 (ref 0.9–1.1)

## 2019-02-22 ENCOUNTER — COMMUNICATION - HEALTHEAST (OUTPATIENT)
Dept: CARDIOLOGY | Facility: CLINIC | Age: 74
End: 2019-02-22

## 2019-02-22 DIAGNOSIS — I48.91 A-FIB (H): ICD-10-CM

## 2019-02-26 ENCOUNTER — AMBULATORY - HEALTHEAST (OUTPATIENT)
Dept: CARDIOLOGY | Facility: CLINIC | Age: 74
End: 2019-02-26

## 2019-02-26 DIAGNOSIS — I48.91 ATRIAL FIBRILLATION (H): ICD-10-CM

## 2019-02-26 LAB — INR PPP: 2.5 (ref 0.9–1.1)

## 2019-03-05 ENCOUNTER — AMBULATORY - HEALTHEAST (OUTPATIENT)
Dept: CARDIOLOGY | Facility: CLINIC | Age: 74
End: 2019-03-05

## 2019-03-05 DIAGNOSIS — I48.91 ATRIAL FIBRILLATION (H): ICD-10-CM

## 2019-03-05 LAB — INR PPP: 3.5 (ref 0.9–1.1)

## 2019-03-11 ENCOUNTER — AMBULATORY - HEALTHEAST (OUTPATIENT)
Dept: PULMONOLOGY | Facility: OTHER | Age: 74
End: 2019-03-11

## 2019-03-11 ENCOUNTER — AMBULATORY - HEALTHEAST (OUTPATIENT)
Dept: CARDIOLOGY | Facility: CLINIC | Age: 74
End: 2019-03-11

## 2019-03-11 DIAGNOSIS — Z98.890 STATUS POST CATHETER ABLATION OF ATRIAL FIBRILLATION: ICD-10-CM

## 2019-03-11 DIAGNOSIS — J44.9 COPD (CHRONIC OBSTRUCTIVE PULMONARY DISEASE) (H): ICD-10-CM

## 2019-03-11 DIAGNOSIS — I10 ESSENTIAL HYPERTENSION: ICD-10-CM

## 2019-03-11 DIAGNOSIS — Z95.810 ICD (IMPLANTABLE CARDIOVERTER-DEFIBRILLATOR), DUAL, IN SITU: ICD-10-CM

## 2019-03-11 DIAGNOSIS — I48.19 PERSISTENT ATRIAL FIBRILLATION (H): ICD-10-CM

## 2019-03-12 ENCOUNTER — AMBULATORY - HEALTHEAST (OUTPATIENT)
Dept: CARDIOLOGY | Facility: CLINIC | Age: 74
End: 2019-03-12

## 2019-03-12 DIAGNOSIS — I48.91 ATRIAL FIBRILLATION (H): ICD-10-CM

## 2019-03-12 LAB — INR PPP: 2 (ref 0.9–1.1)

## 2019-03-19 ENCOUNTER — AMBULATORY - HEALTHEAST (OUTPATIENT)
Dept: CARDIOLOGY | Facility: CLINIC | Age: 74
End: 2019-03-19

## 2019-03-19 DIAGNOSIS — I48.91 ATRIAL FIBRILLATION (H): ICD-10-CM

## 2019-03-19 LAB — INR PPP: 2.7 (ref 0.9–1.1)

## 2019-03-26 ENCOUNTER — AMBULATORY - HEALTHEAST (OUTPATIENT)
Dept: CARDIOLOGY | Facility: CLINIC | Age: 74
End: 2019-03-26

## 2019-03-26 DIAGNOSIS — I48.91 ATRIAL FIBRILLATION (H): ICD-10-CM

## 2019-03-26 LAB — INR PPP: 2.5 (ref 0.9–1.1)

## 2019-04-01 ENCOUNTER — COMMUNICATION - HEALTHEAST (OUTPATIENT)
Dept: PULMONOLOGY | Facility: OTHER | Age: 74
End: 2019-04-01

## 2019-04-02 ENCOUNTER — AMBULATORY - HEALTHEAST (OUTPATIENT)
Dept: CARDIOLOGY | Facility: CLINIC | Age: 74
End: 2019-04-02

## 2019-04-02 DIAGNOSIS — I48.91 ATRIAL FIBRILLATION (H): ICD-10-CM

## 2019-04-02 LAB — INTERNATIONAL NORMALIZATION RATIO, POC - HISTORICAL: 2.9

## 2019-04-09 ENCOUNTER — AMBULATORY - HEALTHEAST (OUTPATIENT)
Dept: CARDIOLOGY | Facility: CLINIC | Age: 74
End: 2019-04-09

## 2019-04-09 DIAGNOSIS — I48.91 ATRIAL FIBRILLATION (H): ICD-10-CM

## 2019-04-09 LAB — INTERNATIONAL NORMALIZATION RATIO, POC - HISTORICAL: 3

## 2019-04-11 ENCOUNTER — AMBULATORY - HEALTHEAST (OUTPATIENT)
Dept: CARDIOLOGY | Facility: CLINIC | Age: 74
End: 2019-04-11

## 2019-04-11 ENCOUNTER — OFFICE VISIT - HEALTHEAST (OUTPATIENT)
Dept: PULMONOLOGY | Facility: OTHER | Age: 74
End: 2019-04-11

## 2019-04-11 DIAGNOSIS — R04.2 COUGH WITH HEMOPTYSIS: ICD-10-CM

## 2019-04-11 DIAGNOSIS — J44.9 COPD, GROUP D, BY GOLD 2017 CLASSIFICATION (H): ICD-10-CM

## 2019-04-11 DIAGNOSIS — J44.9 COPD, GROUP B, BY GOLD 2017 CLASSIFICATION (H): ICD-10-CM

## 2019-04-11 ASSESSMENT — MIFFLIN-ST. JEOR: SCORE: 2094.77

## 2019-04-16 ENCOUNTER — AMBULATORY - HEALTHEAST (OUTPATIENT)
Dept: CARDIOLOGY | Facility: CLINIC | Age: 74
End: 2019-04-16

## 2019-04-16 DIAGNOSIS — I48.91 ATRIAL FIBRILLATION (H): ICD-10-CM

## 2019-04-16 LAB — INR PPP: 3.3 (ref 0.9–1.1)

## 2019-04-17 ENCOUNTER — HOSPITAL ENCOUNTER (OUTPATIENT)
Dept: CT IMAGING | Facility: HOSPITAL | Age: 74
Discharge: HOME | End: 2019-04-17
Attending: INTERNAL MEDICINE

## 2019-04-17 ENCOUNTER — RECORDS - HEALTHEAST (OUTPATIENT)
Dept: ADMINISTRATIVE | Facility: OTHER | Age: 74
End: 2019-04-17

## 2019-04-17 DIAGNOSIS — R04.2 COUGH WITH HEMOPTYSIS: ICD-10-CM

## 2019-04-17 LAB
CREAT BLD-MCNC: 1.4 MG/DL (ref 0.7–1.3)
GFR SERPL CREATININE-BSD FRML MDRD: 50 ML/MIN/1.73M2

## 2019-04-18 ENCOUNTER — COMMUNICATION - HEALTHEAST (OUTPATIENT)
Dept: PULMONOLOGY | Facility: OTHER | Age: 74
End: 2019-04-18

## 2019-04-18 DIAGNOSIS — R04.2 HEMOPTYSIS: ICD-10-CM

## 2019-04-19 ENCOUNTER — COMMUNICATION - HEALTHEAST (OUTPATIENT)
Dept: PULMONOLOGY | Facility: OTHER | Age: 74
End: 2019-04-19

## 2019-04-22 ENCOUNTER — AMBULATORY - HEALTHEAST (OUTPATIENT)
Dept: CARDIOLOGY | Facility: CLINIC | Age: 74
End: 2019-04-22

## 2019-04-22 DIAGNOSIS — I48.91 ATRIAL FIBRILLATION (H): ICD-10-CM

## 2019-04-22 LAB — INR PPP: 3.3 (ref 0.9–1.1)

## 2019-05-01 ENCOUNTER — AMBULATORY - HEALTHEAST (OUTPATIENT)
Dept: CARDIOLOGY | Facility: CLINIC | Age: 74
End: 2019-05-01

## 2019-05-01 DIAGNOSIS — I48.91 ATRIAL FIBRILLATION (H): ICD-10-CM

## 2019-05-01 LAB — INTERNATIONAL NORMALIZATION RATIO, POC - HISTORICAL: 2.4

## 2019-05-07 ENCOUNTER — AMBULATORY - HEALTHEAST (OUTPATIENT)
Dept: CARDIOLOGY | Facility: CLINIC | Age: 74
End: 2019-05-07

## 2019-05-07 DIAGNOSIS — I48.91 ATRIAL FIBRILLATION (H): ICD-10-CM

## 2019-05-07 LAB — INTERNATIONAL NORMALIZATION RATIO, POC - HISTORICAL: 1.8

## 2019-05-10 ENCOUNTER — COMMUNICATION - HEALTHEAST (OUTPATIENT)
Dept: ANTICOAGULATION | Facility: CLINIC | Age: 74
End: 2019-05-10

## 2019-05-14 ENCOUNTER — AMBULATORY - HEALTHEAST (OUTPATIENT)
Dept: CARDIOLOGY | Facility: CLINIC | Age: 74
End: 2019-05-14

## 2019-05-14 ENCOUNTER — COMMUNICATION - HEALTHEAST (OUTPATIENT)
Dept: CARDIOLOGY | Facility: CLINIC | Age: 74
End: 2019-05-14

## 2019-05-14 DIAGNOSIS — I48.19 PERSISTENT ATRIAL FIBRILLATION (H): ICD-10-CM

## 2019-05-14 DIAGNOSIS — Z95.810 ICD (IMPLANTABLE CARDIOVERTER-DEFIBRILLATOR), DUAL, IN SITU: ICD-10-CM

## 2019-05-14 DIAGNOSIS — I48.91 ATRIAL FIBRILLATION (H): ICD-10-CM

## 2019-05-14 DIAGNOSIS — I48.91 AF (ATRIAL FIBRILLATION) (H): ICD-10-CM

## 2019-05-14 LAB — INR PPP: 1.6 (ref 0.9–1.1)

## 2019-05-15 ENCOUNTER — COMMUNICATION - HEALTHEAST (OUTPATIENT)
Dept: ANTICOAGULATION | Facility: CLINIC | Age: 74
End: 2019-05-15

## 2019-05-15 DIAGNOSIS — I48.91 ATRIAL FIBRILLATION (H): ICD-10-CM

## 2019-05-21 ENCOUNTER — COMMUNICATION - HEALTHEAST (OUTPATIENT)
Dept: ANTICOAGULATION | Facility: CLINIC | Age: 74
End: 2019-05-21

## 2019-05-21 DIAGNOSIS — I48.91 ATRIAL FIBRILLATION (H): ICD-10-CM

## 2019-05-21 LAB — INR PPP: 1.7 (ref 0.9–1.1)

## 2019-05-28 ENCOUNTER — COMMUNICATION - HEALTHEAST (OUTPATIENT)
Dept: ANTICOAGULATION | Facility: CLINIC | Age: 74
End: 2019-05-28

## 2019-05-28 DIAGNOSIS — I48.91 ATRIAL FIBRILLATION (H): ICD-10-CM

## 2019-05-28 LAB — INR PPP: 2.1 (ref 0.9–1.1)

## 2019-06-04 ENCOUNTER — COMMUNICATION - HEALTHEAST (OUTPATIENT)
Dept: ANTICOAGULATION | Facility: CLINIC | Age: 74
End: 2019-06-04

## 2019-06-04 DIAGNOSIS — I48.91 ATRIAL FIBRILLATION (H): ICD-10-CM

## 2019-06-04 LAB — INR PPP: 2.6 (ref 0.9–1.1)

## 2019-06-11 ENCOUNTER — AMBULATORY - HEALTHEAST (OUTPATIENT)
Dept: CARDIOLOGY | Facility: CLINIC | Age: 74
End: 2019-06-11

## 2019-06-11 ENCOUNTER — COMMUNICATION - HEALTHEAST (OUTPATIENT)
Dept: ANTICOAGULATION | Facility: CLINIC | Age: 74
End: 2019-06-11

## 2019-06-11 DIAGNOSIS — I48.91 ATRIAL FIBRILLATION (H): ICD-10-CM

## 2019-06-11 DIAGNOSIS — Z95.810 ICD (IMPLANTABLE CARDIOVERTER-DEFIBRILLATOR), DUAL, IN SITU: ICD-10-CM

## 2019-06-11 LAB
HCC DEVICE COMMENTS: NORMAL
HCC DEVICE IMPLANTING PROVIDER: NORMAL
HCC DEVICE MANUFACTURE: NORMAL
HCC DEVICE MODEL: NORMAL
HCC DEVICE SERIAL NUMBER: NORMAL
HCC DEVICE TYPE: NORMAL
INR PPP: 2.1 (ref 0.9–1.1)

## 2019-06-18 ENCOUNTER — COMMUNICATION - HEALTHEAST (OUTPATIENT)
Dept: ANTICOAGULATION | Facility: CLINIC | Age: 74
End: 2019-06-18

## 2019-06-18 DIAGNOSIS — I48.91 ATRIAL FIBRILLATION (H): ICD-10-CM

## 2019-06-18 LAB — INR PPP: 3.5 (ref 0.9–1.1)

## 2019-06-25 ENCOUNTER — COMMUNICATION - HEALTHEAST (OUTPATIENT)
Dept: ANTICOAGULATION | Facility: CLINIC | Age: 74
End: 2019-06-25

## 2019-06-25 DIAGNOSIS — I48.91 ATRIAL FIBRILLATION (H): ICD-10-CM

## 2019-06-25 LAB — INR PPP: 2.3 (ref 0.9–1.1)

## 2019-07-02 ENCOUNTER — COMMUNICATION - HEALTHEAST (OUTPATIENT)
Dept: ANTICOAGULATION | Facility: CLINIC | Age: 74
End: 2019-07-02

## 2019-07-02 DIAGNOSIS — I48.91 ATRIAL FIBRILLATION (H): ICD-10-CM

## 2019-07-02 LAB — INR PPP: 2.3 (ref 0.9–1.1)

## 2019-07-09 ENCOUNTER — COMMUNICATION - HEALTHEAST (OUTPATIENT)
Dept: ANTICOAGULATION | Facility: CLINIC | Age: 74
End: 2019-07-09

## 2019-07-09 DIAGNOSIS — I48.91 ATRIAL FIBRILLATION (H): ICD-10-CM

## 2019-07-09 LAB — INR PPP: 2.9 (ref 0.9–1.1)

## 2019-07-14 ENCOUNTER — COMMUNICATION - HEALTHEAST (OUTPATIENT)
Dept: CARDIOLOGY | Facility: CLINIC | Age: 74
End: 2019-07-14

## 2019-07-14 DIAGNOSIS — E83.42 HYPOMAGNESEMIA: ICD-10-CM

## 2019-07-15 ENCOUNTER — HOSPITAL ENCOUNTER (OUTPATIENT)
Dept: CT IMAGING | Facility: HOSPITAL | Age: 74
Discharge: HOME | End: 2019-07-15
Attending: INTERNAL MEDICINE

## 2019-07-15 DIAGNOSIS — R04.2 HEMOPTYSIS: ICD-10-CM

## 2019-07-16 ENCOUNTER — COMMUNICATION - HEALTHEAST (OUTPATIENT)
Dept: ANTICOAGULATION | Facility: CLINIC | Age: 74
End: 2019-07-16

## 2019-07-16 DIAGNOSIS — I48.91 ATRIAL FIBRILLATION (H): ICD-10-CM

## 2019-07-16 LAB — INR PPP: 3.3 (ref 0.9–1.1)

## 2019-07-17 ENCOUNTER — RECORDS - HEALTHEAST (OUTPATIENT)
Dept: ADMINISTRATIVE | Facility: OTHER | Age: 74
End: 2019-07-17

## 2019-07-17 ENCOUNTER — OFFICE VISIT - HEALTHEAST (OUTPATIENT)
Dept: PULMONOLOGY | Facility: OTHER | Age: 74
End: 2019-07-17

## 2019-07-17 DIAGNOSIS — J44.9 CHRONIC OBSTRUCTIVE PULMONARY DISEASE, UNSPECIFIED COPD TYPE (H): ICD-10-CM

## 2019-07-17 ASSESSMENT — MIFFLIN-ST. JEOR: SCORE: 2069.83

## 2019-07-23 ENCOUNTER — COMMUNICATION - HEALTHEAST (OUTPATIENT)
Dept: ANTICOAGULATION | Facility: CLINIC | Age: 74
End: 2019-07-23

## 2019-07-23 ENCOUNTER — COMMUNICATION - HEALTHEAST (OUTPATIENT)
Dept: CARDIOLOGY | Facility: CLINIC | Age: 74
End: 2019-07-23

## 2019-07-23 DIAGNOSIS — I48.19 PERSISTENT ATRIAL FIBRILLATION (H): ICD-10-CM

## 2019-07-23 DIAGNOSIS — I48.91 A-FIB (H): ICD-10-CM

## 2019-07-23 DIAGNOSIS — I48.91 ATRIAL FIBRILLATION (H): ICD-10-CM

## 2019-07-23 LAB — INR PPP: 2.4 (ref 0.9–1.1)

## 2019-07-30 ENCOUNTER — COMMUNICATION - HEALTHEAST (OUTPATIENT)
Dept: ANTICOAGULATION | Facility: CLINIC | Age: 74
End: 2019-07-30

## 2019-07-30 LAB — INR PPP: 2.2 (ref 0.9–1.1)

## 2019-08-02 ENCOUNTER — RECORDS - HEALTHEAST (OUTPATIENT)
Dept: LAB | Facility: CLINIC | Age: 74
End: 2019-08-02

## 2019-08-02 LAB
ANION GAP SERPL CALCULATED.3IONS-SCNC: 9 MMOL/L (ref 5–18)
BUN SERPL-MCNC: 29 MG/DL (ref 8–28)
CALCIUM SERPL-MCNC: 9.3 MG/DL (ref 8.5–10.5)
CHLORIDE BLD-SCNC: 110 MMOL/L (ref 98–107)
CHOLEST SERPL-MCNC: 127 MG/DL
CO2 SERPL-SCNC: 21 MMOL/L (ref 22–31)
CREAT SERPL-MCNC: 1.14 MG/DL (ref 0.7–1.3)
FASTING STATUS PATIENT QL REPORTED: NORMAL
GFR SERPL CREATININE-BSD FRML MDRD: >60 ML/MIN/1.73M2
GLUCOSE BLD-MCNC: 110 MG/DL (ref 70–125)
HDLC SERPL-MCNC: 47 MG/DL
LDLC SERPL CALC-MCNC: 65 MG/DL
POTASSIUM BLD-SCNC: 4.6 MMOL/L (ref 3.5–5)
PSA SERPL-MCNC: 0.2 NG/ML (ref 0–6.5)
SODIUM SERPL-SCNC: 140 MMOL/L (ref 136–145)
TRIGL SERPL-MCNC: 74 MG/DL

## 2019-08-05 LAB — 25(OH)D3 SERPL-MCNC: 79.1 NG/ML (ref 30–80)

## 2019-08-06 ENCOUNTER — COMMUNICATION - HEALTHEAST (OUTPATIENT)
Dept: ANTICOAGULATION | Facility: CLINIC | Age: 74
End: 2019-08-06

## 2019-08-06 DIAGNOSIS — I48.91 ATRIAL FIBRILLATION (H): ICD-10-CM

## 2019-08-06 LAB — INR PPP: 2.5 (ref 0.9–1.1)

## 2019-08-13 ENCOUNTER — COMMUNICATION - HEALTHEAST (OUTPATIENT)
Dept: ANTICOAGULATION | Facility: CLINIC | Age: 74
End: 2019-08-13

## 2019-08-13 DIAGNOSIS — I48.91 ATRIAL FIBRILLATION (H): ICD-10-CM

## 2019-08-13 LAB — INR PPP: 2.5 (ref 0.9–1.1)

## 2019-08-20 ENCOUNTER — COMMUNICATION - HEALTHEAST (OUTPATIENT)
Dept: ANTICOAGULATION | Facility: CLINIC | Age: 74
End: 2019-08-20

## 2019-08-20 DIAGNOSIS — I48.91 ATRIAL FIBRILLATION (H): ICD-10-CM

## 2019-08-20 LAB — INR PPP: 2.4 (ref 0.9–1.1)

## 2019-08-26 ENCOUNTER — HOSPITAL ENCOUNTER (EMERGENCY)
Facility: HOSPITAL | Age: 74
Discharge: HOME | End: 2019-08-26
Attending: FAMILY MEDICINE | Admitting: FAMILY MEDICINE
Payer: OTHER MISCELLANEOUS

## 2019-08-26 ENCOUNTER — COMMUNICATION - HEALTHEAST (OUTPATIENT)
Dept: CARDIOLOGY | Facility: CLINIC | Age: 74
End: 2019-08-26

## 2019-08-26 ENCOUNTER — ANCILLARY PROCEDURE (OUTPATIENT)
Dept: CARDIOLOGY | Facility: CLINIC | Age: 74
End: 2019-08-26
Attending: INTERNAL MEDICINE
Payer: OTHER MISCELLANEOUS

## 2019-08-26 ENCOUNTER — AMBULATORY - HEALTHEAST (OUTPATIENT)
Dept: CARDIOLOGY | Facility: CLINIC | Age: 74
End: 2019-08-26

## 2019-08-26 VITALS
RESPIRATION RATE: 16 BRPM | TEMPERATURE: 97.2 F | SYSTOLIC BLOOD PRESSURE: 168 MMHG | DIASTOLIC BLOOD PRESSURE: 103 MMHG | OXYGEN SATURATION: 98 %

## 2019-08-26 DIAGNOSIS — Z45.018 ENCOUNTER FOR INTERROGATION OF CARDIAC PACEMAKER: Primary | ICD-10-CM

## 2019-08-26 DIAGNOSIS — Z95.810 ICD (IMPLANTABLE CARDIOVERTER-DEFIBRILLATOR), DUAL, IN SITU: ICD-10-CM

## 2019-08-26 PROCEDURE — 99282 EMERGENCY DEPT VISIT SF MDM: CPT | Mod: Z6 | Performed by: FAMILY MEDICINE

## 2019-08-26 PROCEDURE — 93295 DEV INTERROG REMOTE 1/2/MLT: CPT | Performed by: INTERNAL MEDICINE

## 2019-08-26 PROCEDURE — 99281 EMR DPT VST MAYX REQ PHY/QHP: CPT

## 2019-08-26 PROCEDURE — 93296 REM INTERROG EVL PM/IDS: CPT | Mod: ZF

## 2019-08-26 RX ORDER — FUROSEMIDE 20 MG
20 TABLET ORAL DAILY
COMMUNITY
End: 2021-09-24 | Stop reason: DRUGHIGH

## 2019-08-26 RX ORDER — CHLORAL HYDRATE 500 MG
1 CAPSULE ORAL 2 TIMES DAILY
COMMUNITY

## 2019-08-26 RX ORDER — ATORVASTATIN CALCIUM 40 MG/1
40 TABLET, FILM COATED ORAL EVERY EVENING
COMMUNITY

## 2019-08-26 RX ORDER — AMOXICILLIN 500 MG/1
500-2000 TABLET, FILM COATED ORAL
COMMUNITY
End: 2022-03-01

## 2019-08-26 RX ORDER — ACETAMINOPHEN 325 MG/1
650 TABLET ORAL 2 TIMES DAILY PRN
COMMUNITY

## 2019-08-26 RX ORDER — MULTIVITAMIN,THERAPEUTIC
1 TABLET ORAL DAILY
COMMUNITY

## 2019-08-26 RX ORDER — BUDESONIDE AND FORMOTEROL FUMARATE DIHYDRATE 160; 4.5 UG/1; UG/1
2 AEROSOL RESPIRATORY (INHALATION) 2 TIMES DAILY
COMMUNITY
End: 2023-01-01

## 2019-08-26 RX ORDER — WARFARIN SODIUM 2.5 MG/1
TABLET ORAL DAILY
COMMUNITY
End: 2021-09-24

## 2019-08-26 RX ORDER — MULTIVITAMIN WITH IRON
1 TABLET ORAL DAILY
COMMUNITY
End: 2021-12-17

## 2019-08-26 RX ORDER — SOTALOL HYDROCHLORIDE 160 MG/1
160 TABLET ORAL 2 TIMES DAILY
COMMUNITY
End: 2022-03-15

## 2019-08-26 RX ORDER — UBIDECARENONE 100 MG
100 CAPSULE ORAL DAILY
COMMUNITY
End: 2022-03-25

## 2019-08-26 RX ORDER — MULTIVIT-MIN/IRON/FOLIC ACID/K 18-600-40
1000 CAPSULE ORAL DAILY
COMMUNITY

## 2019-08-26 RX ORDER — VARDENAFIL HYDROCHLORIDE 10 MG/1
10 TABLET ORAL DAILY PRN
COMMUNITY
End: 2021-11-12

## 2019-08-26 RX ORDER — LOSARTAN POTASSIUM 50 MG/1
50 TABLET ORAL DAILY
COMMUNITY
End: 2021-09-24

## 2019-08-26 RX ORDER — TIOTROPIUM BROMIDE 18 UG/1
18 CAPSULE ORAL; RESPIRATORY (INHALATION) DAILY
COMMUNITY
End: 2023-01-01

## 2019-08-26 RX ORDER — ALBUTEROL SULFATE 90 UG/1
2 AEROSOL, METERED RESPIRATORY (INHALATION) EVERY 4 HOURS PRN
COMMUNITY
End: 2023-01-01

## 2019-08-26 ASSESSMENT — ENCOUNTER SYMPTOMS
NECK STIFFNESS: 0
FEVER: 0
COLOR CHANGE: 0
EYE REDNESS: 0
DIFFICULTY URINATING: 0
CONFUSION: 0
COUGH: 0
PALPITATIONS: 0
ARTHRALGIAS: 0
ABDOMINAL PAIN: 0
HEADACHES: 0

## 2019-08-26 NOTE — ED TRIAGE NOTES
"Pt presents today with c/o pace maker \"problem\", states it is beeping at 1010 in the morning. PT thinks its due to \"being out of range\". Wants it interrogated. Pace maker is : zintin serial #[id number] and Model number #[id number].  "

## 2019-08-26 NOTE — ED NOTES
"Device Nurse Katie called back.  States patient's device is alarming because the patient has been in Atrial Fibrillation continuously since 8/7/19 and has been trying to send a \"message\" but can not due to poor connection.  \"unsucessful care alert\" this will continue to alarm until he is seen at his clinic with interrogation with a .  Dr. Infante updated.  "

## 2019-08-26 NOTE — ED AVS SNAPSHOT
[facility]  [address] [address]  Phone:  [phone]                                    Buck Sinclair   MRN: [id number]    Department:  [facility]   Date of Visit:  8/26/2019           After Visit Summary Signature Page    I have received my discharge instructions, and my questions have been answered. I have discussed any challenges I see with this plan with the nurse or doctor.    ..........................................................................................................................................  Patient/Patient Representative Signature      ..........................................................................................................................................  Patient Representative Print Name and Relationship to Patient    ..................................................               ................................................  Date                                   Time    ..........................................................................................................................................  Reviewed by Signature/Title    ...................................................              ..............................................  Date                                               Time          22EPIC Rev 08/18

## 2019-08-26 NOTE — ED NOTES
[place] device nurse called; brief information given.  Device interrogation completed.  Will wait for Device nurse to call back with read.

## 2019-08-26 NOTE — ED PROVIDER NOTES
"  History     Chief Complaint   Patient presents with     pacer check     needs a pacer/defib monitor check. notes due to from oot and it beeps daily for the past week at 10am. states \"to far from his monitor for it to talk to his monitor\". denies symptosm     HPI  Buck Sinclair is a 74 year old man who presents requesting interrogation of his pacer/defibrillator. He notes no chest pain, shortness of breath, dizziness, diaphoresis or recent illness/injury.    Allergies:  Allergies   Allergen Reactions     Lisinopril        Problem List:    There are no active problems to display for this patient.       Past Medical History:    No past medical history on file.    Past Surgical History:    No past surgical history on file.    Family History:    No family history on file.    Social History:  Marital Status:   [2]  Social History     Tobacco Use     Smoking status: Not on file   Substance Use Topics     Alcohol use: Not on file     Drug use: Not on file        Medications:      acetaminophen (TYLENOL) 325 MG tablet   Ascorbic Acid (VITAMIN C) 500 MG CAPS   atorvastatin (LIPITOR) 40 MG tablet   budesonide-formoterol (SYMBICORT) 160-4.5 MCG/ACT Inhaler   co-enzyme Q-10 100 MG CAPS capsule   fish oil-omega-3 fatty acids 1000 MG capsule   furosemide (LASIX) 20 MG tablet   losartan (COZAAR) 50 MG tablet   magnesium 250 MG tablet   multivitamin, therapeutic (THERA-VIT) TABS tablet   sotalol (BETAPACE) 160 MG tablet   tiotropium (SPIRIVA) 18 MCG inhaled capsule   UNABLE TO FIND   vardenafil (LEVITRA) 10 MG tablet   warfarin (COUMADIN) 2.5 MG tablet   albuterol (PROAIR HFA/PROVENTIL HFA/VENTOLIN HFA) 108 (90 Base) MCG/ACT inhaler   amoxicillin (AMOXIL) 500 MG tablet         Review of Systems   Constitutional: Negative for fever.   HENT: Negative for congestion.    Eyes: Negative for redness.   Respiratory: Negative for cough.    Cardiovascular: Negative for palpitations and leg swelling.   Gastrointestinal: Negative " "for abdominal pain.   Genitourinary: Negative for difficulty urinating.   Musculoskeletal: Negative for arthralgias and neck stiffness.   Skin: Negative for color change.   Neurological: Negative for headaches.   Psychiatric/Behavioral: Negative for confusion.       Physical Exam   BP: (!) 168/103  Heart Rate: 86  Temp: 97.2  F (36.2  C)  Resp: 16  SpO2: 98 %      Physical Exam    General Appearance: well-developed, well-nourished, alert & oriented, no apparent distress.    HEENT: atraumatic. Pupils equal, round & reactive to light, extraocular movements intact. Bilateral ear canals clear. Nose without rhinorrhea or epistaxis. Clear oropharynx. Moist mucous membranes.    Neck: normal inspection, non-tender, full & painless ROM, supple, no lymphadenopathy or nuchal rigidity. No jugular venous distension.    Cardiovascular: regular rate, rhythm, normal S1 & S2, no murmurs, rubs or gallops.    Respiratory: clear lung sounds with good air entry, no wheezes rales or rhonchi, no acute respiratory distress.    Gastrointestinal: normal inspection, normal bowel sounds, non-tender, no masses or organomegaly.    Extremities: normal inspection, 2+/4+ pulses bilaterally, normal & painless ROM, non-tender, joints normal.    Neurologic: CN II - XII intact, no motor/sensory deficit.    Skin: normal color, no skin rash.    ED Course     1530  Update from pacemaker evaluation reveals Device Nurse Katie called back. States patient's device is alarming because the patient has been in Atrial Fibrillation continuously since 8/7/19 (which he is aware of). The pacemaker has been trying to send a \"message\" but can not due to poor connection. \"unsucessful care alert.\" This will continue to alarm until he is seen at his clinic with interrogation with a .       Procedures        No results found for this or any previous visit (from the past 24 hour(s)).    Medications - No data to display    Assessments & Plan (with Medical Decision " Making)   The patient is a 74 year old man who presented for a pacemaker interrogation.    1) Pacemaker interrogation - patient will follow-up with his PCP in the [facility] to correct the error message from his pacemaker. He continues to be symptom-free at this time. The patient verbalizes agreement with this plan as well as to immediately return to the ER with any new/worsening S/Sx.      I have reviewed the nursing notes.    I have reviewed the findings, diagnosis, plan and need for follow up with the patient.      New Prescriptions    No medications on file       Final diagnoses:   Encounter for interrogation of cardiac pacemaker       8/26/2019   [facility]     Troy Infante MD  08/26/19 1141

## 2019-08-27 ENCOUNTER — COMMUNICATION - HEALTHEAST (OUTPATIENT)
Dept: ANTICOAGULATION | Facility: CLINIC | Age: 74
End: 2019-08-27

## 2019-08-27 DIAGNOSIS — I48.91 ATRIAL FIBRILLATION (H): ICD-10-CM

## 2019-08-27 LAB — INR PPP: 2 (ref 0.9–1.1)

## 2019-08-29 LAB
MDC_IDC_MSMT_BATTERY_REMAINING_LONGEVITY: 37 MO
MDC_IDC_MSMT_BATTERY_VOLTAGE: 2.95 V
MDC_IDC_MSMT_CAP_CHARGE_TIME: 4.1 S
MDC_IDC_MSMT_CAP_CHARGE_TYPE: NORMAL
MDC_IDC_MSMT_LEADCHNL_RA_IMPEDANCE_VALUE: 342 OHM
MDC_IDC_MSMT_LEADCHNL_RA_SENSING_INTR_AMPL: 0.3 MV
MDC_IDC_MSMT_LEADCHNL_RV_IMPEDANCE_VALUE: 418 OHM
MDC_IDC_MSMT_LEADCHNL_RV_PACING_THRESHOLD_AMPLITUDE: 0.5 V
MDC_IDC_MSMT_LEADCHNL_RV_PACING_THRESHOLD_PULSEWIDTH: 0.4 MS
MDC_IDC_MSMT_LEADCHNL_RV_SENSING_INTR_AMPL: 9.3 MV
MDC_IDC_PG_IMPLANT_DTM: NORMAL
MDC_IDC_PG_MFG: NORMAL
MDC_IDC_PG_MODEL: NORMAL
MDC_IDC_PG_SERIAL: NORMAL
MDC_IDC_PG_TYPE: NORMAL
MDC_IDC_SESS_CLINIC_NAME: NORMAL
MDC_IDC_SESS_DTM: NORMAL
MDC_IDC_SESS_TYPE: NORMAL
MDC_IDC_SET_BRADY_AT_MODE_SWITCH_RATE: 171 {BEATS}/MIN
MDC_IDC_SET_BRADY_LOWRATE: 50 {BEATS}/MIN
MDC_IDC_SET_BRADY_MAX_SENSOR_RATE: 120 {BEATS}/MIN
MDC_IDC_SET_BRADY_MAX_TRACKING_RATE: 130 {BEATS}/MIN
MDC_IDC_SET_BRADY_MODE: NORMAL
MDC_IDC_SET_BRADY_PAV_DELAY_LOW: 340 MS
MDC_IDC_SET_BRADY_SAV_DELAY_LOW: 340 MS
MDC_IDC_SET_LEADCHNL_RA_PACING_AMPLITUDE: 1.5 V
MDC_IDC_SET_LEADCHNL_RA_PACING_POLARITY: NORMAL
MDC_IDC_SET_LEADCHNL_RA_PACING_PULSEWIDTH: 0.4 MS
MDC_IDC_SET_LEADCHNL_RA_SENSING_POLARITY: NORMAL
MDC_IDC_SET_LEADCHNL_RA_SENSING_SENSITIVITY: 0.15 MV
MDC_IDC_SET_LEADCHNL_RV_PACING_AMPLITUDE: 1.5 V
MDC_IDC_SET_LEADCHNL_RV_PACING_POLARITY: NORMAL
MDC_IDC_SET_LEADCHNL_RV_PACING_PULSEWIDTH: 0.4 MS
MDC_IDC_SET_LEADCHNL_RV_SENSING_POLARITY: NORMAL
MDC_IDC_SET_LEADCHNL_RV_SENSING_SENSITIVITY: 0.45 MV
MDC_IDC_SET_ZONE_DETECTION_BEATS_DENOMINATOR: 32 {BEATS}
MDC_IDC_SET_ZONE_DETECTION_BEATS_NUMERATOR: 24 {BEATS}
MDC_IDC_SET_ZONE_DETECTION_INTERVAL: 240 MS
MDC_IDC_SET_ZONE_DETECTION_INTERVAL: 300 MS
MDC_IDC_SET_ZONE_DETECTION_INTERVAL: 319.15 MS
MDC_IDC_SET_ZONE_DETECTION_INTERVAL: 350.88 MS
MDC_IDC_SET_ZONE_DETECTION_INTERVAL: 359.28 MS
MDC_IDC_SET_ZONE_TYPE: NORMAL
MDC_IDC_STAT_AT_BURDEN_PERCENT: 11.5 %
MDC_IDC_STAT_BRADY_RA_PERCENT_PACED: 19.7 %
MDC_IDC_STAT_BRADY_RV_PERCENT_PACED: 2.4 %
MDC_IDC_STAT_EPISODE_RECENT_COUNT: 0
MDC_IDC_STAT_EPISODE_RECENT_COUNT: 0
MDC_IDC_STAT_EPISODE_RECENT_COUNT: 7
MDC_IDC_STAT_EPISODE_TYPE: NORMAL

## 2019-09-03 ENCOUNTER — COMMUNICATION - HEALTHEAST (OUTPATIENT)
Dept: ANTICOAGULATION | Facility: CLINIC | Age: 74
End: 2019-09-03

## 2019-09-03 DIAGNOSIS — I48.91 ATRIAL FIBRILLATION (H): ICD-10-CM

## 2019-09-03 LAB — INR PPP: 2 (ref 0.9–1.1)

## 2019-09-05 ENCOUNTER — COMMUNICATION - HEALTHEAST (OUTPATIENT)
Dept: CARDIOLOGY | Facility: CLINIC | Age: 74
End: 2019-09-05

## 2019-09-05 DIAGNOSIS — I48.19 PERSISTENT ATRIAL FIBRILLATION (H): ICD-10-CM

## 2019-09-05 DIAGNOSIS — Z86.79 HISTORY OF SICK SINUS SYNDROME: ICD-10-CM

## 2019-09-05 DIAGNOSIS — I42.9 CARDIOMYOPATHY, IDIOPATHIC (H): ICD-10-CM

## 2019-09-05 DIAGNOSIS — I42.9 IDIOPATHIC CARDIOMYOPATHY (H): ICD-10-CM

## 2019-09-06 ENCOUNTER — AMBULATORY - HEALTHEAST (OUTPATIENT)
Dept: CARDIOLOGY | Facility: CLINIC | Age: 74
End: 2019-09-06

## 2019-09-06 DIAGNOSIS — Z95.810 ICD (IMPLANTABLE CARDIOVERTER-DEFIBRILLATOR), DUAL, IN SITU: ICD-10-CM

## 2019-09-06 ASSESSMENT — MIFFLIN-ST. JEOR: SCORE: 2094.77

## 2019-09-09 ENCOUNTER — COMMUNICATION - HEALTHEAST (OUTPATIENT)
Dept: CARDIOLOGY | Facility: CLINIC | Age: 74
End: 2019-09-09

## 2019-09-10 ENCOUNTER — AMBULATORY - HEALTHEAST (OUTPATIENT)
Dept: CARDIOLOGY | Facility: CLINIC | Age: 74
End: 2019-09-10

## 2019-09-10 ENCOUNTER — COMMUNICATION - HEALTHEAST (OUTPATIENT)
Dept: ANTICOAGULATION | Facility: CLINIC | Age: 74
End: 2019-09-10

## 2019-09-10 DIAGNOSIS — I48.91 ATRIAL FIBRILLATION (H): ICD-10-CM

## 2019-09-10 LAB — INR PPP: 2.2 (ref 0.9–1.1)

## 2019-09-17 LAB — INR PPP: 2.2 (ref 0.9–1.1)

## 2019-09-18 ENCOUNTER — COMMUNICATION - HEALTHEAST (OUTPATIENT)
Dept: ANTICOAGULATION | Facility: CLINIC | Age: 74
End: 2019-09-18

## 2019-09-18 DIAGNOSIS — I48.91 ATRIAL FIBRILLATION (H): ICD-10-CM

## 2019-09-24 ENCOUNTER — COMMUNICATION - HEALTHEAST (OUTPATIENT)
Dept: ANTICOAGULATION | Facility: CLINIC | Age: 74
End: 2019-09-24

## 2019-09-24 DIAGNOSIS — I48.91 ATRIAL FIBRILLATION (H): ICD-10-CM

## 2019-09-24 LAB — INR PPP: 2.2 (ref 0.9–1.1)

## 2019-10-01 ENCOUNTER — COMMUNICATION - HEALTHEAST (OUTPATIENT)
Dept: ANTICOAGULATION | Facility: CLINIC | Age: 74
End: 2019-10-01

## 2019-10-01 LAB — INR PPP: 2.2 (ref 0.9–1.1)

## 2019-10-08 ENCOUNTER — COMMUNICATION - HEALTHEAST (OUTPATIENT)
Dept: ANTICOAGULATION | Facility: CLINIC | Age: 74
End: 2019-10-08

## 2019-10-08 DIAGNOSIS — I48.91 ATRIAL FIBRILLATION (H): ICD-10-CM

## 2019-10-08 LAB — INR PPP: 2.3 (ref 0.9–1.1)

## 2019-10-15 ENCOUNTER — COMMUNICATION - HEALTHEAST (OUTPATIENT)
Dept: ANTICOAGULATION | Facility: CLINIC | Age: 74
End: 2019-10-15

## 2019-10-15 DIAGNOSIS — I48.91 ATRIAL FIBRILLATION (H): ICD-10-CM

## 2019-10-15 LAB — INR PPP: 2 (ref 0.9–1.1)

## 2019-10-18 ENCOUNTER — COMMUNICATION - HEALTHEAST (OUTPATIENT)
Dept: ANTICOAGULATION | Facility: CLINIC | Age: 74
End: 2019-10-18

## 2019-10-18 DIAGNOSIS — I48.91 ATRIAL FIBRILLATION (H): ICD-10-CM

## 2019-10-22 ENCOUNTER — COMMUNICATION - HEALTHEAST (OUTPATIENT)
Dept: ANTICOAGULATION | Facility: CLINIC | Age: 74
End: 2019-10-22

## 2019-10-22 DIAGNOSIS — I48.91 ATRIAL FIBRILLATION (H): ICD-10-CM

## 2019-10-22 LAB — INR PPP: 2.9 (ref 0.9–1.1)

## 2019-10-24 ENCOUNTER — HOSPITAL ENCOUNTER (OUTPATIENT)
Dept: CARDIOLOGY | Facility: HOSPITAL | Age: 74
Discharge: HOME | End: 2019-10-24
Attending: INTERNAL MEDICINE

## 2019-10-24 DIAGNOSIS — I48.19 PERSISTENT ATRIAL FIBRILLATION (H): ICD-10-CM

## 2019-10-24 DIAGNOSIS — I10 ESSENTIAL HYPERTENSION: ICD-10-CM

## 2019-10-24 DIAGNOSIS — Z95.810 ICD (IMPLANTABLE CARDIOVERTER-DEFIBRILLATOR), DUAL, IN SITU: ICD-10-CM

## 2019-10-24 DIAGNOSIS — Z98.890 STATUS POST CATHETER ABLATION OF ATRIAL FIBRILLATION: ICD-10-CM

## 2019-10-24 LAB
AORTIC ROOT: 4.3 CM
AORTIC VALVE MEAN VELOCITY: 87 CM/S
ASCENDING AORTA: 4.3 CM
AV DIMENSIONLESS INDEX VTI: 0.6
AV MEAN GRADIENT: 4 MMHG
AV PEAK GRADIENT: 6.4 MMHG
AV VALVE AREA: 3 CM2
BSA FOR ECHO PROCEDURE: 2.6 M2
CV BLOOD PRESSURE: ABNORMAL MMHG
CV ECHO HEIGHT: 75 IN
CV ECHO WEIGHT: 282 LBS
DOP CALC AO PEAK VEL: 126 CM/S
DOP CALC AO VTI: 26.6 CM
DOP CALC LVOT AREA: 5.31 CM2
DOP CALC LVOT DIAMETER: 2.6 CM
DOP CALC LVOT STROKE VOLUME: 80.1 CM3
DOP CALCLVOT PEAK VEL VTI: 15.1 CM
ECHO EJECTION FRACTION ESTIMATED: 45 %
EJECTION FRACTION: 51 % (ref 55–75)
FRACTIONAL SHORTENING: 32.3 % (ref 28–44)
INTERVENTRICULAR SEPTUM IN END DIASTOLE: 1.4 CM (ref 0.6–1)
IVS/PW RATIO: 1
LA AREA 1: 53.7 CM2
LA AREA 2: 40.8 CM2
LEFT ATRIUM LENGTH: 8.27 CM
LEFT ATRIUM SIZE: 6.2 CM
LEFT ATRIUM VOLUME INDEX: 86.6 ML/M2
LEFT ATRIUM VOLUME: 225.2 ML
LEFT VENTRICLE CARDIAC INDEX: 2.3 L/MIN/M2
LEFT VENTRICLE CARDIAC OUTPUT: 6 L/MIN
LEFT VENTRICLE DIASTOLIC VOLUME INDEX: 64.6 CM3/M2 (ref 34–74)
LEFT VENTRICLE DIASTOLIC VOLUME: 168 CM3 (ref 62–150)
LEFT VENTRICLE HEART RATE: 75 BPM
LEFT VENTRICLE MASS INDEX: 157.2 G/M2
LEFT VENTRICLE SYSTOLIC VOLUME INDEX: 31.5 CM3/M2 (ref 11–31)
LEFT VENTRICLE SYSTOLIC VOLUME: 82 CM3 (ref 21–61)
LEFT VENTRICULAR INTERNAL DIMENSION IN DIASTOLE: 6.2 CM (ref 4.2–5.8)
LEFT VENTRICULAR INTERNAL DIMENSION IN SYSTOLE: 4.2 CM (ref 2.5–4)
LEFT VENTRICULAR MASS: 408.8 G
LEFT VENTRICULAR OUTFLOW TRACT MEAN GRADIENT: 1 MMHG
LEFT VENTRICULAR OUTFLOW TRACT MEAN VELOCITY: 45.7 CM/S
LEFT VENTRICULAR POSTERIOR WALL IN END DIASTOLE: 1.4 CM (ref 0.6–1)
LV STROKE VOLUME INDEX: 30.8 ML/M2
MV AVERAGE E/E' RATIO: 11.7 CM/S
MV DECELERATION TIME: 201 MS
MV E'TISSUE VEL-LAT: 11.6 CM/S
MV E'TISSUE VEL-MED: 5.46 CM/S
MV LATERAL E/E' RATIO: 8.6
MV MEDIAL E/E' RATIO: 18.2
MV PEAK E VELOCITY: 99.6 CM/S
NUC REST DIASTOLIC VOLUME INDEX: 4512 LBS
NUC REST SYSTOLIC VOLUME INDEX: 75 IN
PR MAX PG: 9 MMHG
PR PEAK VELOCITY: 181 CM/S
TRICUSPID REGURGITATION PEAK PRESSURE GRADIENT: 56.6 MMHG
TRICUSPID VALVE ANULAR PLANE SYSTOLIC EXCURSION: 2.4 CM
TRICUSPID VALVE PEAK REGURGITANT VELOCITY: 376 CM/S

## 2019-10-24 ASSESSMENT — MIFFLIN-ST. JEOR: SCORE: 2094.77

## 2019-10-29 ENCOUNTER — RECORDS - HEALTHEAST (OUTPATIENT)
Dept: ADMINISTRATIVE | Facility: OTHER | Age: 74
End: 2019-10-29

## 2019-10-29 ENCOUNTER — AMBULATORY - HEALTHEAST (OUTPATIENT)
Dept: CARDIOLOGY | Facility: CLINIC | Age: 74
End: 2019-10-29

## 2019-10-29 LAB — INR PPP: 2.1 (ref 0.9–1.1)

## 2019-10-31 ENCOUNTER — AMBULATORY - HEALTHEAST (OUTPATIENT)
Dept: CARDIOLOGY | Facility: CLINIC | Age: 74
End: 2019-10-31

## 2019-10-31 DIAGNOSIS — I48.0 PAROXYSMAL ATRIAL FIBRILLATION (H): ICD-10-CM

## 2019-10-31 DIAGNOSIS — Z98.890 STATUS POST CATHETER ABLATION OF ATRIAL FIBRILLATION: ICD-10-CM

## 2019-10-31 DIAGNOSIS — I48.21 PERMANENT ATRIAL FIBRILLATION (H): ICD-10-CM

## 2019-10-31 DIAGNOSIS — Z95.810 ICD (IMPLANTABLE CARDIOVERTER-DEFIBRILLATOR), DUAL, IN SITU: ICD-10-CM

## 2019-10-31 DIAGNOSIS — I25.84 CORONARY ARTERY DISEASE DUE TO CALCIFIED CORONARY LESION: ICD-10-CM

## 2019-10-31 DIAGNOSIS — Z95.810 ICD (IMPLANTABLE CARDIOVERTER-DEFIBRILLATOR) IN PLACE: ICD-10-CM

## 2019-10-31 DIAGNOSIS — I25.10 CORONARY ARTERY DISEASE DUE TO CALCIFIED CORONARY LESION: ICD-10-CM

## 2019-10-31 ASSESSMENT — MIFFLIN-ST. JEOR: SCORE: 2089.33

## 2019-11-05 ENCOUNTER — COMMUNICATION - HEALTHEAST (OUTPATIENT)
Dept: ANTICOAGULATION | Facility: CLINIC | Age: 74
End: 2019-11-05

## 2019-11-05 DIAGNOSIS — I48.0 PAROXYSMAL ATRIAL FIBRILLATION (H): ICD-10-CM

## 2019-11-05 LAB — INR PPP: 1.9 (ref 0.9–1.1)

## 2019-11-07 ENCOUNTER — HOSPITAL ENCOUNTER (OUTPATIENT)
Dept: NUCLEAR MEDICINE | Facility: HOSPITAL | Age: 74
Discharge: HOME | End: 2019-11-07
Attending: INTERNAL MEDICINE

## 2019-11-07 ENCOUNTER — HOSPITAL ENCOUNTER (OUTPATIENT)
Dept: CARDIOLOGY | Facility: HOSPITAL | Age: 74
Discharge: HOME | End: 2019-11-07
Attending: INTERNAL MEDICINE

## 2019-11-07 LAB
CV STRESS CURRENT BP HE: NORMAL
CV STRESS CURRENT HR HE: 57
CV STRESS CURRENT HR HE: 57
CV STRESS CURRENT HR HE: 58
CV STRESS CURRENT HR HE: 59
CV STRESS CURRENT HR HE: 59
CV STRESS CURRENT HR HE: 60
CV STRESS CURRENT HR HE: 66
CV STRESS CURRENT HR HE: 67
CV STRESS CURRENT HR HE: 68
CV STRESS CURRENT HR HE: 68
CV STRESS CURRENT HR HE: 69
CV STRESS CURRENT HR HE: 70
CV STRESS CURRENT HR HE: 74
CV STRESS DEVIATION TIME HE: NORMAL
CV STRESS ECHO PERCENT HR HE: NORMAL
CV STRESS EXERCISE STAGE HE: NORMAL
CV STRESS FINAL RESTING BP HE: NORMAL
CV STRESS FINAL RESTING HR HE: 67
CV STRESS MAX HR HE: 76
CV STRESS MAX TREADMILL GRADE HE: 0
CV STRESS MAX TREADMILL SPEED HE: 0
CV STRESS PEAK DIA BP HE: NORMAL
CV STRESS PEAK SYS BP HE: NORMAL
CV STRESS PHASE HE: NORMAL
CV STRESS PROTOCOL HE: NORMAL
CV STRESS RESTING PT POSITION HE: NORMAL
CV STRESS ST DEVIATION AMOUNT HE: NORMAL
CV STRESS ST DEVIATION ELEVATION HE: NORMAL
CV STRESS ST EVELATION AMOUNT HE: NORMAL
CV STRESS TEST TYPE HE: NORMAL
CV STRESS TOTAL STAGE TIME MIN 1 HE: NORMAL
NUC STRESS EJECTION FRACTION: 63 %
RATE PRESSURE PRODUCT: NORMAL
STRESS ECHO BASELINE DIASTOLIC HE: 84
STRESS ECHO BASELINE HR: 63 BPM
STRESS ECHO BASELINE SYSTOLIC BP: 143
STRESS ECHO CALCULATED PERCENT HR: 52 %
STRESS ECHO LAST STRESS DIASTOLIC BP: 82
STRESS ECHO LAST STRESS HR: 69
STRESS ECHO LAST STRESS SYSTOLIC BP: 145
STRESS ECHO TARGET HR: 146

## 2019-11-12 ENCOUNTER — COMMUNICATION - HEALTHEAST (OUTPATIENT)
Dept: CARDIOLOGY | Facility: CLINIC | Age: 74
End: 2019-11-12

## 2019-11-12 ENCOUNTER — COMMUNICATION - HEALTHEAST (OUTPATIENT)
Dept: ANTICOAGULATION | Facility: CLINIC | Age: 74
End: 2019-11-12

## 2019-11-12 DIAGNOSIS — I48.0 PAROXYSMAL ATRIAL FIBRILLATION (H): ICD-10-CM

## 2019-11-12 DIAGNOSIS — I48.91 AF (ATRIAL FIBRILLATION) (H): ICD-10-CM

## 2019-11-12 LAB — INR PPP: 2.5 (ref 0.9–1.1)

## 2019-11-19 ENCOUNTER — COMMUNICATION - HEALTHEAST (OUTPATIENT)
Dept: ANTICOAGULATION | Facility: CLINIC | Age: 74
End: 2019-11-19

## 2019-11-19 DIAGNOSIS — I48.0 PAROXYSMAL ATRIAL FIBRILLATION (H): ICD-10-CM

## 2019-11-19 LAB — INR PPP: 2.4 (ref 0.9–1.1)

## 2019-11-26 ENCOUNTER — COMMUNICATION - HEALTHEAST (OUTPATIENT)
Dept: ANTICOAGULATION | Facility: CLINIC | Age: 74
End: 2019-11-26

## 2019-11-26 ENCOUNTER — COMMUNICATION - HEALTHEAST (OUTPATIENT)
Dept: CARDIOLOGY | Facility: CLINIC | Age: 74
End: 2019-11-26

## 2019-11-26 DIAGNOSIS — I48.19 PERSISTENT ATRIAL FIBRILLATION (H): ICD-10-CM

## 2019-11-26 DIAGNOSIS — Z95.810 ICD (IMPLANTABLE CARDIOVERTER-DEFIBRILLATOR), DUAL, IN SITU: ICD-10-CM

## 2019-11-26 DIAGNOSIS — I48.0 PAROXYSMAL ATRIAL FIBRILLATION (H): ICD-10-CM

## 2019-11-26 LAB — INR PPP: 2.4 (ref 0.9–1.1)

## 2019-12-03 ENCOUNTER — COMMUNICATION - HEALTHEAST (OUTPATIENT)
Dept: ANTICOAGULATION | Facility: CLINIC | Age: 74
End: 2019-12-03

## 2019-12-03 DIAGNOSIS — I48.0 PAROXYSMAL ATRIAL FIBRILLATION (H): ICD-10-CM

## 2019-12-03 LAB — INR PPP: 2.9 (ref 0.9–1.1)

## 2019-12-10 ENCOUNTER — COMMUNICATION - HEALTHEAST (OUTPATIENT)
Dept: ANTICOAGULATION | Facility: CLINIC | Age: 74
End: 2019-12-10

## 2019-12-10 DIAGNOSIS — I48.0 PAROXYSMAL ATRIAL FIBRILLATION (H): ICD-10-CM

## 2019-12-10 LAB — INR PPP: 3.2 (ref 0.9–1.1)

## 2019-12-17 ENCOUNTER — COMMUNICATION - HEALTHEAST (OUTPATIENT)
Dept: ANTICOAGULATION | Facility: CLINIC | Age: 74
End: 2019-12-17

## 2019-12-17 DIAGNOSIS — I48.0 PAROXYSMAL ATRIAL FIBRILLATION (H): ICD-10-CM

## 2019-12-17 LAB — INR PPP: 2.7 (ref 0.9–1.1)

## 2019-12-24 ENCOUNTER — COMMUNICATION - HEALTHEAST (OUTPATIENT)
Dept: ANTICOAGULATION | Facility: CLINIC | Age: 74
End: 2019-12-24

## 2019-12-24 DIAGNOSIS — I48.0 PAROXYSMAL ATRIAL FIBRILLATION (H): ICD-10-CM

## 2019-12-24 LAB — INR PPP: 2.5 (ref 0.9–1.1)

## 2019-12-31 ENCOUNTER — COMMUNICATION - HEALTHEAST (OUTPATIENT)
Dept: ANTICOAGULATION | Facility: CLINIC | Age: 74
End: 2019-12-31

## 2019-12-31 DIAGNOSIS — I48.0 PAROXYSMAL ATRIAL FIBRILLATION (H): ICD-10-CM

## 2019-12-31 LAB — INR PPP: 2.9 (ref 0.9–1.1)

## 2020-01-07 ENCOUNTER — COMMUNICATION - HEALTHEAST (OUTPATIENT)
Dept: ANTICOAGULATION | Facility: CLINIC | Age: 75
End: 2020-01-07

## 2020-01-07 DIAGNOSIS — I48.0 PAROXYSMAL ATRIAL FIBRILLATION (H): ICD-10-CM

## 2020-01-07 LAB — INR PPP: 3 (ref 0.9–1.1)

## 2020-01-14 ENCOUNTER — COMMUNICATION - HEALTHEAST (OUTPATIENT)
Dept: ADMINISTRATIVE | Facility: CLINIC | Age: 75
End: 2020-01-14

## 2020-01-14 ENCOUNTER — COMMUNICATION - HEALTHEAST (OUTPATIENT)
Dept: ANTICOAGULATION | Facility: CLINIC | Age: 75
End: 2020-01-14

## 2020-01-14 DIAGNOSIS — I48.0 PAROXYSMAL ATRIAL FIBRILLATION (H): ICD-10-CM

## 2020-01-14 LAB — INR PPP: 2.9 (ref 0.9–1.1)

## 2020-01-16 ENCOUNTER — OFFICE VISIT - HEALTHEAST (OUTPATIENT)
Dept: PULMONOLOGY | Facility: OTHER | Age: 75
End: 2020-01-16

## 2020-01-16 DIAGNOSIS — J44.9 CHRONIC OBSTRUCTIVE PULMONARY DISEASE, UNSPECIFIED COPD TYPE (H): ICD-10-CM

## 2020-01-16 ASSESSMENT — MIFFLIN-ST. JEOR: SCORE: 2121.99

## 2020-01-21 ENCOUNTER — COMMUNICATION - HEALTHEAST (OUTPATIENT)
Dept: CARDIOLOGY | Facility: CLINIC | Age: 75
End: 2020-01-21

## 2020-01-21 ENCOUNTER — COMMUNICATION - HEALTHEAST (OUTPATIENT)
Dept: ANTICOAGULATION | Facility: CLINIC | Age: 75
End: 2020-01-21

## 2020-01-21 DIAGNOSIS — E83.42 HYPOMAGNESEMIA: ICD-10-CM

## 2020-01-21 DIAGNOSIS — I48.0 PAROXYSMAL ATRIAL FIBRILLATION (H): ICD-10-CM

## 2020-01-21 LAB — INR PPP: 3.2 (ref 0.9–1.1)

## 2020-01-22 ENCOUNTER — COMMUNICATION - HEALTHEAST (OUTPATIENT)
Dept: CARDIOLOGY | Facility: CLINIC | Age: 75
End: 2020-01-22

## 2020-01-22 ENCOUNTER — COMMUNICATION - HEALTHEAST (OUTPATIENT)
Dept: ANTICOAGULATION | Facility: CLINIC | Age: 75
End: 2020-01-22

## 2020-01-22 DIAGNOSIS — I48.91 A-FIB (H): ICD-10-CM

## 2020-01-28 ENCOUNTER — COMMUNICATION - HEALTHEAST (OUTPATIENT)
Dept: ANTICOAGULATION | Facility: CLINIC | Age: 75
End: 2020-01-28

## 2020-01-28 DIAGNOSIS — I48.0 PAROXYSMAL ATRIAL FIBRILLATION (H): ICD-10-CM

## 2020-01-28 LAB — INR PPP: 2.7 (ref 0.9–1.1)

## 2020-02-03 ENCOUNTER — AMBULATORY - HEALTHEAST (OUTPATIENT)
Dept: CARDIOLOGY | Facility: CLINIC | Age: 75
End: 2020-02-03

## 2020-02-03 DIAGNOSIS — I42.1 HYPERTROPHIC OBSTRUCTIVE CARDIOMYOPATHY (H): ICD-10-CM

## 2020-02-03 DIAGNOSIS — Z95.810 ICD (IMPLANTABLE CARDIOVERTER-DEFIBRILLATOR) IN PLACE: ICD-10-CM

## 2020-02-04 ENCOUNTER — COMMUNICATION - HEALTHEAST (OUTPATIENT)
Dept: ANTICOAGULATION | Facility: CLINIC | Age: 75
End: 2020-02-04

## 2020-02-04 ENCOUNTER — RECORDS - HEALTHEAST (OUTPATIENT)
Dept: LAB | Facility: CLINIC | Age: 75
End: 2020-02-04

## 2020-02-04 DIAGNOSIS — I48.0 PAROXYSMAL ATRIAL FIBRILLATION (H): ICD-10-CM

## 2020-02-04 LAB
ANION GAP SERPL CALCULATED.3IONS-SCNC: 9 MMOL/L (ref 5–18)
BUN SERPL-MCNC: 26 MG/DL (ref 8–28)
CALCIUM SERPL-MCNC: 9.1 MG/DL (ref 8.5–10.5)
CHLORIDE BLD-SCNC: 108 MMOL/L (ref 98–107)
CHOLEST SERPL-MCNC: 133 MG/DL
CO2 SERPL-SCNC: 23 MMOL/L (ref 22–31)
CREAT SERPL-MCNC: 1.26 MG/DL (ref 0.7–1.3)
FASTING STATUS PATIENT QL REPORTED: NORMAL
GFR SERPL CREATININE-BSD FRML MDRD: 56 ML/MIN/1.73M2
GLUCOSE BLD-MCNC: 101 MG/DL (ref 70–125)
HCC DEVICE COMMENTS: NORMAL
HCC DEVICE IMPLANTING PROVIDER: NORMAL
HCC DEVICE MANUFACTURE: NORMAL
HCC DEVICE MODEL: NORMAL
HCC DEVICE SERIAL NUMBER: NORMAL
HCC DEVICE TYPE: NORMAL
HDLC SERPL-MCNC: 52 MG/DL
INR PPP: 3.1 (ref 0.9–1.1)
LDLC SERPL CALC-MCNC: 66 MG/DL
POTASSIUM BLD-SCNC: 4.5 MMOL/L (ref 3.5–5)
SODIUM SERPL-SCNC: 140 MMOL/L (ref 136–145)
TRIGL SERPL-MCNC: 75 MG/DL

## 2020-02-11 ENCOUNTER — COMMUNICATION - HEALTHEAST (OUTPATIENT)
Dept: ANTICOAGULATION | Facility: CLINIC | Age: 75
End: 2020-02-11

## 2020-02-11 ENCOUNTER — AMBULATORY - HEALTHEAST (OUTPATIENT)
Dept: CARDIOLOGY | Facility: CLINIC | Age: 75
End: 2020-02-11

## 2020-02-11 DIAGNOSIS — I48.0 PAROXYSMAL ATRIAL FIBRILLATION (H): ICD-10-CM

## 2020-02-11 LAB — INR PPP: 2.4 (ref 0.9–1.1)

## 2020-02-18 ENCOUNTER — COMMUNICATION - HEALTHEAST (OUTPATIENT)
Dept: ANTICOAGULATION | Facility: CLINIC | Age: 75
End: 2020-02-18

## 2020-02-18 DIAGNOSIS — I48.0 PAROXYSMAL ATRIAL FIBRILLATION (H): ICD-10-CM

## 2020-02-18 LAB — INR PPP: 2.2 (ref 0.9–1.1)

## 2020-02-19 ENCOUNTER — COMMUNICATION - HEALTHEAST (OUTPATIENT)
Dept: CARDIOLOGY | Facility: CLINIC | Age: 75
End: 2020-02-19

## 2020-02-19 DIAGNOSIS — I48.19 PERSISTENT ATRIAL FIBRILLATION (H): ICD-10-CM

## 2020-02-19 DIAGNOSIS — Z95.810 ICD (IMPLANTABLE CARDIOVERTER-DEFIBRILLATOR), DUAL, IN SITU: ICD-10-CM

## 2020-02-24 ENCOUNTER — AMBULATORY - HEALTHEAST (OUTPATIENT)
Dept: CARDIOLOGY | Facility: CLINIC | Age: 75
End: 2020-02-24

## 2020-02-24 ENCOUNTER — COMMUNICATION - HEALTHEAST (OUTPATIENT)
Dept: CARDIOLOGY | Facility: CLINIC | Age: 75
End: 2020-02-24

## 2020-02-24 DIAGNOSIS — Z95.810 ICD (IMPLANTABLE CARDIOVERTER-DEFIBRILLATOR), DUAL, IN SITU: ICD-10-CM

## 2020-02-24 DIAGNOSIS — I48.19 PERSISTENT ATRIAL FIBRILLATION (H): ICD-10-CM

## 2020-02-25 ENCOUNTER — COMMUNICATION - HEALTHEAST (OUTPATIENT)
Dept: ANTICOAGULATION | Facility: CLINIC | Age: 75
End: 2020-02-25

## 2020-02-25 ENCOUNTER — COMMUNICATION - HEALTHEAST (OUTPATIENT)
Dept: CARDIOLOGY | Facility: CLINIC | Age: 75
End: 2020-02-25

## 2020-02-25 ENCOUNTER — AMBULATORY - HEALTHEAST (OUTPATIENT)
Dept: CARDIOLOGY | Facility: CLINIC | Age: 75
End: 2020-02-25

## 2020-02-25 DIAGNOSIS — I48.0 PAROXYSMAL ATRIAL FIBRILLATION (H): ICD-10-CM

## 2020-02-25 DIAGNOSIS — Z95.810 ICD (IMPLANTABLE CARDIOVERTER-DEFIBRILLATOR), DUAL, IN SITU: ICD-10-CM

## 2020-02-25 DIAGNOSIS — I48.19 PERSISTENT ATRIAL FIBRILLATION (H): ICD-10-CM

## 2020-02-25 LAB — INR PPP: 2.5 (ref 0.9–1.1)

## 2020-03-03 ENCOUNTER — COMMUNICATION - HEALTHEAST (OUTPATIENT)
Dept: ANTICOAGULATION | Facility: CLINIC | Age: 75
End: 2020-03-03

## 2020-03-03 DIAGNOSIS — I48.0 PAROXYSMAL ATRIAL FIBRILLATION (H): ICD-10-CM

## 2020-03-03 LAB — INR PPP: 2.7 (ref 0.9–1.1)

## 2020-03-10 ENCOUNTER — COMMUNICATION - HEALTHEAST (OUTPATIENT)
Dept: ANTICOAGULATION | Facility: CLINIC | Age: 75
End: 2020-03-10

## 2020-03-10 DIAGNOSIS — I48.0 PAROXYSMAL ATRIAL FIBRILLATION (H): ICD-10-CM

## 2020-03-10 LAB — INR PPP: 2.9 (ref 0.9–1.1)

## 2020-03-17 ENCOUNTER — COMMUNICATION - HEALTHEAST (OUTPATIENT)
Dept: ANTICOAGULATION | Facility: CLINIC | Age: 75
End: 2020-03-17

## 2020-03-17 DIAGNOSIS — I48.0 PAROXYSMAL ATRIAL FIBRILLATION (H): ICD-10-CM

## 2020-03-17 LAB — INR PPP: 2.6 (ref 0.9–1.1)

## 2020-03-18 ENCOUNTER — AMBULATORY - HEALTHEAST (OUTPATIENT)
Dept: PULMONOLOGY | Facility: OTHER | Age: 75
End: 2020-03-18

## 2020-03-18 DIAGNOSIS — J44.9 COPD (CHRONIC OBSTRUCTIVE PULMONARY DISEASE) (H): ICD-10-CM

## 2020-03-24 ENCOUNTER — COMMUNICATION - HEALTHEAST (OUTPATIENT)
Dept: ANTICOAGULATION | Facility: CLINIC | Age: 75
End: 2020-03-24

## 2020-03-24 DIAGNOSIS — I48.0 PAROXYSMAL ATRIAL FIBRILLATION (H): ICD-10-CM

## 2020-03-24 LAB — INR PPP: 2.9 (ref 0.9–1.1)

## 2020-03-31 ENCOUNTER — COMMUNICATION - HEALTHEAST (OUTPATIENT)
Dept: ANTICOAGULATION | Facility: CLINIC | Age: 75
End: 2020-03-31

## 2020-03-31 DIAGNOSIS — I48.0 PAROXYSMAL ATRIAL FIBRILLATION (H): ICD-10-CM

## 2020-03-31 LAB — INR PPP: 2.6 (ref 0.9–1.1)

## 2020-04-03 ENCOUNTER — RECORDS - HEALTHEAST (OUTPATIENT)
Dept: ADMINISTRATIVE | Facility: OTHER | Age: 75
End: 2020-04-03

## 2020-04-03 ENCOUNTER — AMBULATORY - HEALTHEAST (OUTPATIENT)
Dept: CARDIOLOGY | Facility: CLINIC | Age: 75
End: 2020-04-03

## 2020-04-07 ENCOUNTER — COMMUNICATION - HEALTHEAST (OUTPATIENT)
Dept: ANTICOAGULATION | Facility: CLINIC | Age: 75
End: 2020-04-07

## 2020-04-07 ENCOUNTER — AMBULATORY - HEALTHEAST (OUTPATIENT)
Dept: CARDIOLOGY | Facility: CLINIC | Age: 75
End: 2020-04-07

## 2020-04-07 DIAGNOSIS — Z95.810 ICD (IMPLANTABLE CARDIOVERTER-DEFIBRILLATOR) IN PLACE: ICD-10-CM

## 2020-04-07 DIAGNOSIS — I48.0 PAROXYSMAL ATRIAL FIBRILLATION (H): ICD-10-CM

## 2020-04-07 DIAGNOSIS — I25.84 CORONARY ARTERY DISEASE DUE TO CALCIFIED CORONARY LESION: ICD-10-CM

## 2020-04-07 DIAGNOSIS — I25.10 CORONARY ARTERY DISEASE DUE TO CALCIFIED CORONARY LESION: ICD-10-CM

## 2020-04-08 ENCOUNTER — OFFICE VISIT - HEALTHEAST (OUTPATIENT)
Dept: CARDIOLOGY | Facility: CLINIC | Age: 75
End: 2020-04-08

## 2020-04-08 ENCOUNTER — AMBULATORY - HEALTHEAST (OUTPATIENT)
Dept: CARDIOLOGY | Facility: CLINIC | Age: 75
End: 2020-04-08

## 2020-04-08 DIAGNOSIS — I48.19 PERSISTENT ATRIAL FIBRILLATION (H): ICD-10-CM

## 2020-04-08 DIAGNOSIS — Z98.890 STATUS POST CATHETER ABLATION OF ATRIAL FIBRILLATION: ICD-10-CM

## 2020-04-08 DIAGNOSIS — I25.84 CORONARY ARTERY DISEASE DUE TO CALCIFIED CORONARY LESION: ICD-10-CM

## 2020-04-08 DIAGNOSIS — I25.10 CORONARY ARTERY DISEASE DUE TO CALCIFIED CORONARY LESION: ICD-10-CM

## 2020-04-08 DIAGNOSIS — Z95.810 ICD (IMPLANTABLE CARDIOVERTER-DEFIBRILLATOR) IN PLACE: ICD-10-CM

## 2020-04-14 ENCOUNTER — COMMUNICATION - HEALTHEAST (OUTPATIENT)
Dept: ANTICOAGULATION | Facility: CLINIC | Age: 75
End: 2020-04-14

## 2020-04-14 DIAGNOSIS — I48.19 PERSISTENT ATRIAL FIBRILLATION (H): ICD-10-CM

## 2020-04-14 LAB — INR PPP: 2.2 (ref 0.9–1.1)

## 2020-04-15 ENCOUNTER — OFFICE VISIT - HEALTHEAST (OUTPATIENT)
Dept: PULMONOLOGY | Facility: OTHER | Age: 75
End: 2020-04-15

## 2020-04-15 DIAGNOSIS — R06.09 DOE (DYSPNEA ON EXERTION): ICD-10-CM

## 2020-04-15 ASSESSMENT — MIFFLIN-ST. JEOR: SCORE: 2085.7

## 2020-04-21 ENCOUNTER — COMMUNICATION - HEALTHEAST (OUTPATIENT)
Dept: ANTICOAGULATION | Facility: CLINIC | Age: 75
End: 2020-04-21

## 2020-04-21 DIAGNOSIS — I48.19 PERSISTENT ATRIAL FIBRILLATION (H): ICD-10-CM

## 2020-04-21 LAB — INR PPP: 2.5 (ref 0.9–1.1)

## 2020-04-28 ENCOUNTER — COMMUNICATION - HEALTHEAST (OUTPATIENT)
Dept: ANTICOAGULATION | Facility: CLINIC | Age: 75
End: 2020-04-28

## 2020-04-28 DIAGNOSIS — I48.19 PERSISTENT ATRIAL FIBRILLATION (H): ICD-10-CM

## 2020-04-28 LAB — INR PPP: 2.1 (ref 0.9–1.1)

## 2020-05-04 ENCOUNTER — RECORDS - HEALTHEAST (OUTPATIENT)
Dept: ADMINISTRATIVE | Facility: OTHER | Age: 75
End: 2020-05-04

## 2020-05-04 ENCOUNTER — AMBULATORY - HEALTHEAST (OUTPATIENT)
Dept: PULMONOLOGY | Facility: OTHER | Age: 75
End: 2020-05-04

## 2020-05-04 ENCOUNTER — COMMUNICATION - HEALTHEAST (OUTPATIENT)
Dept: PULMONOLOGY | Facility: OTHER | Age: 75
End: 2020-05-04

## 2020-05-04 DIAGNOSIS — J44.9 COPD (CHRONIC OBSTRUCTIVE PULMONARY DISEASE) (H): ICD-10-CM

## 2020-05-05 ENCOUNTER — RECORDS - HEALTHEAST (OUTPATIENT)
Dept: ADMINISTRATIVE | Facility: OTHER | Age: 75
End: 2020-05-05

## 2020-05-05 ENCOUNTER — COMMUNICATION - HEALTHEAST (OUTPATIENT)
Dept: ANTICOAGULATION | Facility: CLINIC | Age: 75
End: 2020-05-05

## 2020-05-05 DIAGNOSIS — I48.19 PERSISTENT ATRIAL FIBRILLATION (H): ICD-10-CM

## 2020-05-05 LAB — INR PPP: 2.4 (ref 0.9–1.1)

## 2020-05-11 ENCOUNTER — COMMUNICATION - HEALTHEAST (OUTPATIENT)
Dept: CARDIOLOGY | Facility: CLINIC | Age: 75
End: 2020-05-11

## 2020-05-11 DIAGNOSIS — I48.19 PERSISTENT ATRIAL FIBRILLATION (H): ICD-10-CM

## 2020-05-11 DIAGNOSIS — Z86.79 HISTORY OF SICK SINUS SYNDROME: ICD-10-CM

## 2020-05-11 DIAGNOSIS — I42.9 IDIOPATHIC CARDIOMYOPATHY (H): ICD-10-CM

## 2020-05-11 DIAGNOSIS — I42.9 CARDIOMYOPATHY, IDIOPATHIC (H): ICD-10-CM

## 2020-05-12 ENCOUNTER — COMMUNICATION - HEALTHEAST (OUTPATIENT)
Dept: ANTICOAGULATION | Facility: CLINIC | Age: 75
End: 2020-05-12

## 2020-05-12 DIAGNOSIS — I48.19 PERSISTENT ATRIAL FIBRILLATION (H): ICD-10-CM

## 2020-05-12 LAB — INR PPP: 2.4 (ref 0.9–1.1)

## 2020-05-19 ENCOUNTER — COMMUNICATION - HEALTHEAST (OUTPATIENT)
Dept: ANTICOAGULATION | Facility: CLINIC | Age: 75
End: 2020-05-19

## 2020-05-19 DIAGNOSIS — I48.19 PERSISTENT ATRIAL FIBRILLATION (H): ICD-10-CM

## 2020-05-19 LAB — INR PPP: 2.3 (ref 0.9–1.1)

## 2020-05-26 ENCOUNTER — COMMUNICATION - HEALTHEAST (OUTPATIENT)
Dept: ANTICOAGULATION | Facility: CLINIC | Age: 75
End: 2020-05-26

## 2020-05-26 DIAGNOSIS — I48.19 PERSISTENT ATRIAL FIBRILLATION (H): ICD-10-CM

## 2020-05-26 LAB — INR PPP: 2.9 (ref 0.9–1.1)

## 2020-06-02 ENCOUNTER — COMMUNICATION - HEALTHEAST (OUTPATIENT)
Dept: ANTICOAGULATION | Facility: CLINIC | Age: 75
End: 2020-06-02

## 2020-06-02 DIAGNOSIS — I48.19 PERSISTENT ATRIAL FIBRILLATION (H): ICD-10-CM

## 2020-06-02 LAB — INR PPP: 2.4 (ref 0.9–1.1)

## 2020-06-09 ENCOUNTER — COMMUNICATION - HEALTHEAST (OUTPATIENT)
Dept: ANTICOAGULATION | Facility: CLINIC | Age: 75
End: 2020-06-09

## 2020-06-09 DIAGNOSIS — I48.19 PERSISTENT ATRIAL FIBRILLATION (H): ICD-10-CM

## 2020-06-09 LAB — INR PPP: 2.5 (ref 0.9–1.1)

## 2020-06-15 ENCOUNTER — COMMUNICATION - HEALTHEAST (OUTPATIENT)
Dept: PULMONOLOGY | Facility: OTHER | Age: 75
End: 2020-06-15

## 2020-06-16 ENCOUNTER — COMMUNICATION - HEALTHEAST (OUTPATIENT)
Dept: ANTICOAGULATION | Facility: CLINIC | Age: 75
End: 2020-06-16

## 2020-06-16 DIAGNOSIS — I48.19 PERSISTENT ATRIAL FIBRILLATION (H): ICD-10-CM

## 2020-06-16 LAB — INR PPP: 2.4 (ref 0.9–1.1)

## 2020-06-23 ENCOUNTER — COMMUNICATION - HEALTHEAST (OUTPATIENT)
Dept: ANTICOAGULATION | Facility: CLINIC | Age: 75
End: 2020-06-23

## 2020-06-23 DIAGNOSIS — I48.19 PERSISTENT ATRIAL FIBRILLATION (H): ICD-10-CM

## 2020-06-23 LAB — INR PPP: 2.1 (ref 0.9–1.1)

## 2020-06-30 ENCOUNTER — COMMUNICATION - HEALTHEAST (OUTPATIENT)
Dept: ANTICOAGULATION | Facility: CLINIC | Age: 75
End: 2020-06-30

## 2020-06-30 DIAGNOSIS — I48.19 PERSISTENT ATRIAL FIBRILLATION (H): ICD-10-CM

## 2020-06-30 LAB — INR PPP: 2.1 (ref 0.9–1.1)

## 2020-07-07 ENCOUNTER — COMMUNICATION - HEALTHEAST (OUTPATIENT)
Dept: ANTICOAGULATION | Facility: CLINIC | Age: 75
End: 2020-07-07

## 2020-07-07 DIAGNOSIS — I48.19 PERSISTENT ATRIAL FIBRILLATION (H): ICD-10-CM

## 2020-07-07 LAB — INR PPP: 2 (ref 0.9–1.1)

## 2020-07-14 ENCOUNTER — COMMUNICATION - HEALTHEAST (OUTPATIENT)
Dept: ANTICOAGULATION | Facility: CLINIC | Age: 75
End: 2020-07-14

## 2020-07-14 DIAGNOSIS — I48.19 PERSISTENT ATRIAL FIBRILLATION (H): ICD-10-CM

## 2020-07-14 LAB — INR PPP: 2 (ref 0.9–1.1)

## 2020-07-15 ENCOUNTER — COMMUNICATION - HEALTHEAST (OUTPATIENT)
Dept: CARDIOLOGY | Facility: CLINIC | Age: 75
End: 2020-07-15

## 2020-07-15 DIAGNOSIS — E83.42 HYPOMAGNESEMIA: ICD-10-CM

## 2020-07-20 ENCOUNTER — AMBULATORY - HEALTHEAST (OUTPATIENT)
Dept: CARDIOLOGY | Facility: CLINIC | Age: 75
End: 2020-07-20

## 2020-07-20 DIAGNOSIS — I42.9 CARDIOMYOPATHY (H): ICD-10-CM

## 2020-07-20 DIAGNOSIS — Z95.810 ICD (IMPLANTABLE CARDIOVERTER-DEFIBRILLATOR) IN PLACE: ICD-10-CM

## 2020-07-21 ENCOUNTER — COMMUNICATION - HEALTHEAST (OUTPATIENT)
Dept: ANTICOAGULATION | Facility: CLINIC | Age: 75
End: 2020-07-21

## 2020-07-21 DIAGNOSIS — I48.19 PERSISTENT ATRIAL FIBRILLATION (H): ICD-10-CM

## 2020-07-21 LAB
HCC DEVICE COMMENTS: NORMAL
HCC DEVICE IMPLANTING PROVIDER: NORMAL
HCC DEVICE MANUFACTURE: NORMAL
HCC DEVICE MODEL: NORMAL
HCC DEVICE SERIAL NUMBER: NORMAL
HCC DEVICE TYPE: NORMAL
INR PPP: 2.2 (ref 0.9–1.1)

## 2020-07-28 ENCOUNTER — RECORDS - HEALTHEAST (OUTPATIENT)
Dept: ADMINISTRATIVE | Facility: OTHER | Age: 75
End: 2020-07-28

## 2020-07-28 ENCOUNTER — COMMUNICATION - HEALTHEAST (OUTPATIENT)
Dept: ANTICOAGULATION | Facility: CLINIC | Age: 75
End: 2020-07-28

## 2020-07-28 DIAGNOSIS — I48.19 PERSISTENT ATRIAL FIBRILLATION (H): ICD-10-CM

## 2020-07-28 LAB — INR PPP: 2.2 (ref 0.9–1.1)

## 2020-08-04 ENCOUNTER — COMMUNICATION - HEALTHEAST (OUTPATIENT)
Dept: SCHEDULING | Facility: CLINIC | Age: 75
End: 2020-08-04

## 2020-08-04 ENCOUNTER — COMMUNICATION - HEALTHEAST (OUTPATIENT)
Dept: ANTICOAGULATION | Facility: CLINIC | Age: 75
End: 2020-08-04

## 2020-08-04 ENCOUNTER — RECORDS - HEALTHEAST (OUTPATIENT)
Dept: ADMINISTRATIVE | Facility: OTHER | Age: 75
End: 2020-08-04

## 2020-08-04 DIAGNOSIS — I48.19 PERSISTENT ATRIAL FIBRILLATION (H): ICD-10-CM

## 2020-08-04 LAB — INR PPP: 2.4 (ref 0.9–1.1)

## 2020-08-11 ENCOUNTER — COMMUNICATION - HEALTHEAST (OUTPATIENT)
Dept: ANTICOAGULATION | Facility: CLINIC | Age: 75
End: 2020-08-11

## 2020-08-11 ENCOUNTER — RECORDS - HEALTHEAST (OUTPATIENT)
Dept: ADMINISTRATIVE | Facility: OTHER | Age: 75
End: 2020-08-11

## 2020-08-11 DIAGNOSIS — I48.19 PERSISTENT ATRIAL FIBRILLATION (H): ICD-10-CM

## 2020-08-11 LAB — INR PPP: 2.2 (ref 0.9–1.1)

## 2020-08-13 ENCOUNTER — COMMUNICATION - HEALTHEAST (OUTPATIENT)
Dept: ANTICOAGULATION | Facility: CLINIC | Age: 75
End: 2020-08-13

## 2020-08-13 ENCOUNTER — COMMUNICATION - HEALTHEAST (OUTPATIENT)
Dept: CARDIOLOGY | Facility: CLINIC | Age: 75
End: 2020-08-13

## 2020-08-13 DIAGNOSIS — I48.91 AF (ATRIAL FIBRILLATION) (H): ICD-10-CM

## 2020-08-13 DIAGNOSIS — I48.91 A-FIB (H): ICD-10-CM

## 2020-08-18 ENCOUNTER — COMMUNICATION - HEALTHEAST (OUTPATIENT)
Dept: CARDIOLOGY | Facility: CLINIC | Age: 75
End: 2020-08-18

## 2020-08-18 ENCOUNTER — RECORDS - HEALTHEAST (OUTPATIENT)
Dept: ADMINISTRATIVE | Facility: OTHER | Age: 75
End: 2020-08-18

## 2020-08-18 ENCOUNTER — COMMUNICATION - HEALTHEAST (OUTPATIENT)
Dept: ANTICOAGULATION | Facility: CLINIC | Age: 75
End: 2020-08-18

## 2020-08-18 DIAGNOSIS — I48.19 PERSISTENT ATRIAL FIBRILLATION (H): ICD-10-CM

## 2020-08-18 LAB — INR PPP: 2.2 (ref 0.9–1.1)

## 2020-08-25 ENCOUNTER — COMMUNICATION - HEALTHEAST (OUTPATIENT)
Dept: ANTICOAGULATION | Facility: CLINIC | Age: 75
End: 2020-08-25

## 2020-08-25 ENCOUNTER — RECORDS - HEALTHEAST (OUTPATIENT)
Dept: ADMINISTRATIVE | Facility: OTHER | Age: 75
End: 2020-08-25

## 2020-08-25 DIAGNOSIS — I48.19 PERSISTENT ATRIAL FIBRILLATION (H): ICD-10-CM

## 2020-08-25 LAB — INR PPP: 2.2 (ref 0.9–1.1)

## 2020-09-01 ENCOUNTER — COMMUNICATION - HEALTHEAST (OUTPATIENT)
Dept: ANTICOAGULATION | Facility: CLINIC | Age: 75
End: 2020-09-01

## 2020-09-01 ENCOUNTER — RECORDS - HEALTHEAST (OUTPATIENT)
Dept: ADMINISTRATIVE | Facility: OTHER | Age: 75
End: 2020-09-01

## 2020-09-01 DIAGNOSIS — I48.19 PERSISTENT ATRIAL FIBRILLATION (H): ICD-10-CM

## 2020-09-01 LAB — INR PPP: 2.5 (ref 0.9–1.1)

## 2020-09-08 ENCOUNTER — RECORDS - HEALTHEAST (OUTPATIENT)
Dept: ADMINISTRATIVE | Facility: OTHER | Age: 75
End: 2020-09-08

## 2020-09-08 LAB — INR PPP: 3 (ref 0.9–1.1)

## 2020-09-09 ENCOUNTER — COMMUNICATION - HEALTHEAST (OUTPATIENT)
Dept: ANTICOAGULATION | Facility: CLINIC | Age: 75
End: 2020-09-09

## 2020-09-09 DIAGNOSIS — I48.19 PERSISTENT ATRIAL FIBRILLATION (H): ICD-10-CM

## 2020-09-15 ENCOUNTER — COMMUNICATION - HEALTHEAST (OUTPATIENT)
Dept: ANTICOAGULATION | Facility: CLINIC | Age: 75
End: 2020-09-15

## 2020-09-15 DIAGNOSIS — I48.19 PERSISTENT ATRIAL FIBRILLATION (H): ICD-10-CM

## 2020-09-15 LAB — INR PPP: 2.3 (ref 0.9–1.1)

## 2020-09-22 ENCOUNTER — COMMUNICATION - HEALTHEAST (OUTPATIENT)
Dept: ANTICOAGULATION | Facility: CLINIC | Age: 75
End: 2020-09-22

## 2020-09-22 ENCOUNTER — RECORDS - HEALTHEAST (OUTPATIENT)
Dept: ADMINISTRATIVE | Facility: OTHER | Age: 75
End: 2020-09-22

## 2020-09-22 DIAGNOSIS — I48.19 PERSISTENT ATRIAL FIBRILLATION (H): ICD-10-CM

## 2020-09-22 LAB — INR PPP: 2.4 (ref 0.9–1.1)

## 2020-09-29 ENCOUNTER — RECORDS - HEALTHEAST (OUTPATIENT)
Dept: ADMINISTRATIVE | Facility: OTHER | Age: 75
End: 2020-09-29

## 2020-09-29 ENCOUNTER — COMMUNICATION - HEALTHEAST (OUTPATIENT)
Dept: ANTICOAGULATION | Facility: CLINIC | Age: 75
End: 2020-09-29

## 2020-09-29 DIAGNOSIS — I48.19 PERSISTENT ATRIAL FIBRILLATION (H): ICD-10-CM

## 2020-09-29 LAB — INR PPP: 2.7 (ref 0.9–1.1)

## 2020-10-05 ENCOUNTER — AMBULATORY - HEALTHEAST (OUTPATIENT)
Dept: CARDIOLOGY | Facility: CLINIC | Age: 75
End: 2020-10-05

## 2020-10-06 ENCOUNTER — RECORDS - HEALTHEAST (OUTPATIENT)
Dept: ADMINISTRATIVE | Facility: OTHER | Age: 75
End: 2020-10-06

## 2020-10-06 ENCOUNTER — COMMUNICATION - HEALTHEAST (OUTPATIENT)
Dept: ANTICOAGULATION | Facility: CLINIC | Age: 75
End: 2020-10-06

## 2020-10-06 DIAGNOSIS — I48.91 ATRIAL FIBRILLATION (H): ICD-10-CM

## 2020-10-06 LAB — INR PPP: 2.4 (ref 0.9–1.1)

## 2020-10-08 ENCOUNTER — RECORDS - HEALTHEAST (OUTPATIENT)
Dept: LAB | Facility: CLINIC | Age: 75
End: 2020-10-08

## 2020-10-08 LAB
ANION GAP SERPL CALCULATED.3IONS-SCNC: 10 MMOL/L (ref 5–18)
BUN SERPL-MCNC: 25 MG/DL (ref 8–28)
CALCIUM SERPL-MCNC: 8.8 MG/DL (ref 8.5–10.5)
CHLORIDE BLD-SCNC: 108 MMOL/L (ref 98–107)
CHOLEST SERPL-MCNC: 116 MG/DL
CO2 SERPL-SCNC: 23 MMOL/L (ref 22–31)
CREAT SERPL-MCNC: 1.13 MG/DL (ref 0.7–1.3)
FASTING STATUS PATIENT QL REPORTED: YES
GFR SERPL CREATININE-BSD FRML MDRD: >60 ML/MIN/1.73M2
GLUCOSE BLD-MCNC: 109 MG/DL (ref 70–125)
HDLC SERPL-MCNC: 51 MG/DL
IRON SATN MFR SERPL: 28 % (ref 20–50)
IRON SERPL-MCNC: 85 UG/DL (ref 42–175)
LDLC SERPL CALC-MCNC: 56 MG/DL
POTASSIUM BLD-SCNC: 4.7 MMOL/L (ref 3.5–5)
SODIUM SERPL-SCNC: 141 MMOL/L (ref 136–145)
TIBC SERPL-MCNC: 304 UG/DL (ref 313–563)
TRANSFERRIN SERPL-MCNC: 243 MG/DL (ref 212–360)
TRIGL SERPL-MCNC: 44 MG/DL

## 2020-10-12 ENCOUNTER — COMMUNICATION - HEALTHEAST (OUTPATIENT)
Dept: CARDIOLOGY | Facility: CLINIC | Age: 75
End: 2020-10-12

## 2020-10-12 DIAGNOSIS — E83.42 HYPOMAGNESEMIA: ICD-10-CM

## 2020-10-13 ENCOUNTER — COMMUNICATION - HEALTHEAST (OUTPATIENT)
Dept: ANTICOAGULATION | Facility: CLINIC | Age: 75
End: 2020-10-13

## 2020-10-13 DIAGNOSIS — I48.91 ATRIAL FIBRILLATION (H): ICD-10-CM

## 2020-10-13 LAB — INR PPP: 2.4 (ref 0.9–1.1)

## 2020-10-20 ENCOUNTER — COMMUNICATION - HEALTHEAST (OUTPATIENT)
Dept: ANTICOAGULATION | Facility: CLINIC | Age: 75
End: 2020-10-20

## 2020-10-20 DIAGNOSIS — I48.91 ATRIAL FIBRILLATION (H): ICD-10-CM

## 2020-10-20 LAB — INR PPP: 2.9 (ref 0.9–1.1)

## 2020-10-27 ENCOUNTER — COMMUNICATION - HEALTHEAST (OUTPATIENT)
Dept: ANTICOAGULATION | Facility: CLINIC | Age: 75
End: 2020-10-27

## 2020-10-27 DIAGNOSIS — I48.91 ATRIAL FIBRILLATION (H): ICD-10-CM

## 2020-10-27 LAB — INR PPP: 2.5 (ref 0.9–1.1)

## 2020-10-29 ENCOUNTER — RECORDS - HEALTHEAST (OUTPATIENT)
Dept: ADMINISTRATIVE | Facility: OTHER | Age: 75
End: 2020-10-29

## 2020-10-29 ENCOUNTER — AMBULATORY - HEALTHEAST (OUTPATIENT)
Dept: CARDIOLOGY | Facility: CLINIC | Age: 75
End: 2020-10-29

## 2020-10-30 ENCOUNTER — COMMUNICATION - HEALTHEAST (OUTPATIENT)
Dept: CARDIOLOGY | Facility: CLINIC | Age: 75
End: 2020-10-30

## 2020-11-02 ENCOUNTER — AMBULATORY - HEALTHEAST (OUTPATIENT)
Dept: CARDIOLOGY | Facility: CLINIC | Age: 75
End: 2020-11-02

## 2020-11-02 DIAGNOSIS — I25.84 CORONARY ARTERY DISEASE DUE TO CALCIFIED CORONARY LESION: ICD-10-CM

## 2020-11-02 DIAGNOSIS — J44.9 COPD, GROUP B, BY GOLD 2017 CLASSIFICATION (H): ICD-10-CM

## 2020-11-02 DIAGNOSIS — I42.8 OTHER CARDIOMYOPATHY (H): ICD-10-CM

## 2020-11-02 DIAGNOSIS — I25.10 CORONARY ARTERY DISEASE DUE TO CALCIFIED CORONARY LESION: ICD-10-CM

## 2020-11-02 DIAGNOSIS — Z95.810 ICD (IMPLANTABLE CARDIOVERTER-DEFIBRILLATOR) IN PLACE: ICD-10-CM

## 2020-11-02 DIAGNOSIS — Z98.890 STATUS POST CATHETER ABLATION OF ATRIAL FIBRILLATION: ICD-10-CM

## 2020-11-02 DIAGNOSIS — I48.19 PERSISTENT ATRIAL FIBRILLATION (H): ICD-10-CM

## 2020-11-02 DIAGNOSIS — I42.9 CARDIOMYOPATHY (H): ICD-10-CM

## 2020-11-02 DIAGNOSIS — I48.11 LONGSTANDING PERSISTENT ATRIAL FIBRILLATION (H): ICD-10-CM

## 2020-11-02 ASSESSMENT — MIFFLIN-ST. JEOR: SCORE: 2171.89

## 2020-11-03 ENCOUNTER — COMMUNICATION - HEALTHEAST (OUTPATIENT)
Dept: ANTICOAGULATION | Facility: CLINIC | Age: 75
End: 2020-11-03

## 2020-11-03 DIAGNOSIS — I48.11 LONGSTANDING PERSISTENT ATRIAL FIBRILLATION (H): ICD-10-CM

## 2020-11-03 LAB — INR PPP: 2.2 (ref 0.9–1.1)

## 2020-11-04 ENCOUNTER — AMBULATORY - HEALTHEAST (OUTPATIENT)
Dept: ANTICOAGULATION | Facility: CLINIC | Age: 75
End: 2020-11-04

## 2020-11-04 DIAGNOSIS — I48.11 LONGSTANDING PERSISTENT ATRIAL FIBRILLATION (H): ICD-10-CM

## 2020-11-10 ENCOUNTER — RECORDS - HEALTHEAST (OUTPATIENT)
Dept: ADMINISTRATIVE | Facility: OTHER | Age: 75
End: 2020-11-10

## 2020-11-10 ENCOUNTER — OFFICE VISIT - HEALTHEAST (OUTPATIENT)
Dept: PULMONOLOGY | Facility: OTHER | Age: 75
End: 2020-11-10

## 2020-11-10 ENCOUNTER — RECORDS - HEALTHEAST (OUTPATIENT)
Dept: PULMONOLOGY | Facility: OTHER | Age: 75
End: 2020-11-10

## 2020-11-10 ENCOUNTER — COMMUNICATION - HEALTHEAST (OUTPATIENT)
Dept: ANTICOAGULATION | Facility: CLINIC | Age: 75
End: 2020-11-10

## 2020-11-10 DIAGNOSIS — I48.11 LONGSTANDING PERSISTENT ATRIAL FIBRILLATION (H): ICD-10-CM

## 2020-11-10 DIAGNOSIS — J44.9 COPD, GROUP B, BY GOLD 2017 CLASSIFICATION (H): ICD-10-CM

## 2020-11-10 DIAGNOSIS — R06.00 DYSPNEA, UNSPECIFIED: ICD-10-CM

## 2020-11-10 DIAGNOSIS — R06.09 DOE (DYSPNEA ON EXERTION): ICD-10-CM

## 2020-11-10 DIAGNOSIS — Z99.81 SUPPLEMENTAL OXYGEN DEPENDENT: ICD-10-CM

## 2020-11-10 LAB
HGB BLD-MCNC: 11.9 G/DL
INR PPP: 2.4 (ref 0.9–1.1)

## 2020-11-10 ASSESSMENT — MIFFLIN-ST. JEOR: SCORE: 2063.02

## 2020-11-12 ENCOUNTER — HOSPITAL ENCOUNTER (OUTPATIENT)
Dept: CARDIOLOGY | Facility: CLINIC | Age: 75
Discharge: HOME | End: 2020-11-12
Attending: INTERNAL MEDICINE

## 2020-11-12 LAB
AORTIC ROOT: 3.9 CM
AORTIC VALVE MEAN VELOCITY: 92.2 CM/S
ASCENDING AORTA: 4.2 CM
AV DIMENSIONLESS INDEX VTI: 0.5
AV MEAN GRADIENT: 4 MMHG
AV PEAK GRADIENT: 6.2 MMHG
AV VALVE AREA: 2.1 CM2
AV VELOCITY RATIO: 0.6
BSA FOR ECHO PROCEDURE: 2.57 M2
CV BLOOD PRESSURE: ABNORMAL MMHG
CV ECHO HEIGHT: 75 IN
CV ECHO WEIGHT: 275 LBS
DOP CALC AO PEAK VEL: 124 CM/S
DOP CALC AO VTI: 26.8 CM
DOP CALC LVOT AREA: 4.15 CM2
DOP CALC LVOT DIAMETER: 2.3 CM
DOP CALC LVOT PEAK VEL: 68.4 CM/S
DOP CALC LVOT STROKE VOLUME: 56.1 CM3
DOP CALC MV VTI: 34.4 CM
DOP CALCLVOT PEAK VEL VTI: 13.5 CM
EJECTION FRACTION: 50 % (ref 55–75)
FRACTIONAL SHORTENING: 29.3 % (ref 28–44)
INTERVENTRICULAR SEPTUM IN END DIASTOLE: 1.3 CM (ref 0.6–1)
IVS/PW RATIO: 0.9
LA AREA 1: 34.5 CM2
LA AREA 2: 32.7 CM2
LEFT ATRIUM LENGTH: 7.15 CM
LEFT ATRIUM SIZE: 6.1 CM
LEFT ATRIUM TO AORTIC ROOT RATIO: 1.56 NO UNITS
LEFT ATRIUM VOLUME INDEX: 52.2 ML/M2
LEFT ATRIUM VOLUME: 134.1 ML
LEFT VENTRICLE CARDIAC INDEX: 1.3 L/MIN/M2
LEFT VENTRICLE CARDIAC OUTPUT: 3.4 L/MIN
LEFT VENTRICLE DIASTOLIC VOLUME INDEX: 34.2 CM3/M2 (ref 34–74)
LEFT VENTRICLE DIASTOLIC VOLUME: 88 CM3 (ref 62–150)
LEFT VENTRICLE HEART RATE: 60 BPM
LEFT VENTRICLE MASS INDEX: 143 G/M2
LEFT VENTRICLE SYSTOLIC VOLUME INDEX: 17.1 CM3/M2 (ref 11–31)
LEFT VENTRICLE SYSTOLIC VOLUME: 44 CM3 (ref 21–61)
LEFT VENTRICULAR INTERNAL DIMENSION IN DIASTOLE: 5.8 CM (ref 4.2–5.8)
LEFT VENTRICULAR INTERNAL DIMENSION IN SYSTOLE: 4.1 CM (ref 2.5–4)
LEFT VENTRICULAR MASS: 367.5 G
LEFT VENTRICULAR OUTFLOW TRACT MEAN GRADIENT: 1 MMHG
LEFT VENTRICULAR OUTFLOW TRACT MEAN VELOCITY: 41.2 CM/S
LEFT VENTRICULAR OUTFLOW TRACT PEAK GRADIENT: 2 MMHG
LEFT VENTRICULAR POSTERIOR WALL IN END DIASTOLE: 1.5 CM (ref 0.6–1)
LV STROKE VOLUME INDEX: 21.8 ML/M2
MITRAL VALVE E/A RATIO: 3.8
MITRAL VALVE MEAN INFLOW VELOCITY: 55.2 CM/S
MITRAL VALVE PEAK VELOCITY: 113 CM/S
MV AREA VTI: 1.63 CM2
MV AVERAGE E/E' RATIO: 13.6 CM/S
MV DECELERATION TIME: 162 MS
MV E'TISSUE VEL-LAT: 9.16 CM/S
MV E'TISSUE VEL-MED: 7.02 CM/S
MV LATERAL E/E' RATIO: 12
MV MEAN GRADIENT: 2 MMHG
MV MEDIAL E/E' RATIO: 15.7
MV PEAK A VELOCITY: 28.6 CM/S
MV PEAK E VELOCITY: 110 CM/S
MV PEAK GRADIENT: 5.1 MMHG
MV VALVE AREA BY CONTINUITY EQUATION: 1.6 CM2
NUC REST DIASTOLIC VOLUME INDEX: 4400 LBS
NUC REST SYSTOLIC VOLUME INDEX: 75 IN
TRICUSPID REGURGITATION PEAK PRESSURE GRADIENT: 53.3 MMHG
TRICUSPID VALVE ANULAR PLANE SYSTOLIC EXCURSION: 1.7 CM
TRICUSPID VALVE PEAK REGURGITANT VELOCITY: 365 CM/S

## 2020-11-12 ASSESSMENT — MIFFLIN-ST. JEOR: SCORE: 2063.02

## 2020-11-14 ENCOUNTER — AMBULATORY - HEALTHEAST (OUTPATIENT)
Dept: CARDIOLOGY | Facility: CLINIC | Age: 75
End: 2020-11-14

## 2020-11-16 ENCOUNTER — AMBULATORY - HEALTHEAST (OUTPATIENT)
Dept: CARDIOLOGY | Facility: CLINIC | Age: 75
End: 2020-11-16

## 2020-11-16 DIAGNOSIS — I42.1 HYPERTROPHIC OBSTRUCTIVE CARDIOMYOPATHY (H): ICD-10-CM

## 2020-11-16 DIAGNOSIS — I42.8 OTHER CARDIOMYOPATHY (H): ICD-10-CM

## 2020-11-16 DIAGNOSIS — I11.0 HYPERTENSIVE HEART DISEASE WITH HEART FAILURE (H): ICD-10-CM

## 2020-11-17 ENCOUNTER — COMMUNICATION - HEALTHEAST (OUTPATIENT)
Dept: ANTICOAGULATION | Facility: CLINIC | Age: 75
End: 2020-11-17

## 2020-11-17 ENCOUNTER — RECORDS - HEALTHEAST (OUTPATIENT)
Dept: ADMINISTRATIVE | Facility: OTHER | Age: 75
End: 2020-11-17

## 2020-11-17 DIAGNOSIS — I48.11 LONGSTANDING PERSISTENT ATRIAL FIBRILLATION (H): ICD-10-CM

## 2020-11-17 LAB — INR PPP: 2.4 (ref 0.9–1.1)

## 2020-11-24 ENCOUNTER — RECORDS - HEALTHEAST (OUTPATIENT)
Dept: ADMINISTRATIVE | Facility: OTHER | Age: 75
End: 2020-11-24

## 2020-11-24 ENCOUNTER — COMMUNICATION - HEALTHEAST (OUTPATIENT)
Dept: ANTICOAGULATION | Facility: CLINIC | Age: 75
End: 2020-11-24

## 2020-11-24 DIAGNOSIS — I48.11 LONGSTANDING PERSISTENT ATRIAL FIBRILLATION (H): ICD-10-CM

## 2020-11-24 LAB — INR PPP: 2.4 (ref 0.9–1.1)

## 2020-11-30 ENCOUNTER — COMMUNICATION - HEALTHEAST (OUTPATIENT)
Dept: CARDIOLOGY | Facility: CLINIC | Age: 75
End: 2020-11-30

## 2020-12-01 ENCOUNTER — RECORDS - HEALTHEAST (OUTPATIENT)
Dept: ADMINISTRATIVE | Facility: OTHER | Age: 75
End: 2020-12-01

## 2020-12-01 ENCOUNTER — AMBULATORY - HEALTHEAST (OUTPATIENT)
Dept: CARDIOLOGY | Facility: CLINIC | Age: 75
End: 2020-12-01

## 2020-12-01 ENCOUNTER — COMMUNICATION - HEALTHEAST (OUTPATIENT)
Dept: ANTICOAGULATION | Facility: CLINIC | Age: 75
End: 2020-12-01

## 2020-12-01 DIAGNOSIS — I11.0 HYPERTENSIVE HEART DISEASE WITH HEART FAILURE (H): ICD-10-CM

## 2020-12-01 DIAGNOSIS — I42.1 HYPERTROPHIC OBSTRUCTIVE CARDIOMYOPATHY (H): ICD-10-CM

## 2020-12-01 DIAGNOSIS — I48.11 LONGSTANDING PERSISTENT ATRIAL FIBRILLATION (H): ICD-10-CM

## 2020-12-01 LAB
ANION GAP SERPL CALCULATED.3IONS-SCNC: 9 MMOL/L (ref 5–18)
BNP SERPL-MCNC: 265 PG/ML (ref 0–76)
BUN SERPL-MCNC: 33 MG/DL (ref 8–28)
CALCIUM SERPL-MCNC: 9.3 MG/DL (ref 8.5–10.5)
CHLORIDE BLD-SCNC: 110 MMOL/L (ref 98–107)
CO2 SERPL-SCNC: 24 MMOL/L (ref 22–31)
CREAT SERPL-MCNC: 1.31 MG/DL (ref 0.7–1.3)
GFR SERPL CREATININE-BSD FRML MDRD: 53 ML/MIN/1.73M2
GLUCOSE BLD-MCNC: 113 MG/DL (ref 70–125)
INR PPP: 2.4 (ref 0.9–1.1)
POTASSIUM BLD-SCNC: 5.2 MMOL/L (ref 3.5–5)
SODIUM SERPL-SCNC: 143 MMOL/L (ref 136–145)

## 2020-12-02 ENCOUNTER — AMBULATORY - HEALTHEAST (OUTPATIENT)
Dept: CARDIOLOGY | Facility: CLINIC | Age: 75
End: 2020-12-02

## 2020-12-02 DIAGNOSIS — I48.91 AF (ATRIAL FIBRILLATION) (H): ICD-10-CM

## 2020-12-02 DIAGNOSIS — I10 ESSENTIAL HYPERTENSION: ICD-10-CM

## 2020-12-08 ENCOUNTER — RECORDS - HEALTHEAST (OUTPATIENT)
Dept: ADMINISTRATIVE | Facility: OTHER | Age: 75
End: 2020-12-08

## 2020-12-08 ENCOUNTER — COMMUNICATION - HEALTHEAST (OUTPATIENT)
Dept: ANTICOAGULATION | Facility: CLINIC | Age: 75
End: 2020-12-08

## 2020-12-08 DIAGNOSIS — I48.11 LONGSTANDING PERSISTENT ATRIAL FIBRILLATION (H): ICD-10-CM

## 2020-12-08 LAB — INR PPP: 2.3 (ref 0.9–1.1)

## 2020-12-15 ENCOUNTER — RECORDS - HEALTHEAST (OUTPATIENT)
Dept: ADMINISTRATIVE | Facility: OTHER | Age: 75
End: 2020-12-15

## 2020-12-15 ENCOUNTER — COMMUNICATION - HEALTHEAST (OUTPATIENT)
Dept: CARDIOLOGY | Facility: CLINIC | Age: 75
End: 2020-12-15

## 2020-12-15 ENCOUNTER — COMMUNICATION - HEALTHEAST (OUTPATIENT)
Dept: ANTICOAGULATION | Facility: CLINIC | Age: 75
End: 2020-12-15

## 2020-12-15 DIAGNOSIS — I48.11 LONGSTANDING PERSISTENT ATRIAL FIBRILLATION (H): ICD-10-CM

## 2020-12-15 LAB — INR PPP: 2.7 (ref 0.9–1.1)

## 2020-12-16 ENCOUNTER — AMBULATORY - HEALTHEAST (OUTPATIENT)
Dept: CARDIOLOGY | Facility: CLINIC | Age: 75
End: 2020-12-16

## 2020-12-16 DIAGNOSIS — I10 ESSENTIAL HYPERTENSION: ICD-10-CM

## 2020-12-16 LAB
ANION GAP SERPL CALCULATED.3IONS-SCNC: 12 MMOL/L (ref 5–18)
BUN SERPL-MCNC: 37 MG/DL (ref 8–28)
CALCIUM SERPL-MCNC: 9.1 MG/DL (ref 8.5–10.5)
CHLORIDE BLD-SCNC: 111 MMOL/L (ref 98–107)
CO2 SERPL-SCNC: 19 MMOL/L (ref 22–31)
CREAT SERPL-MCNC: 1.36 MG/DL (ref 0.7–1.3)
GFR SERPL CREATININE-BSD FRML MDRD: 51 ML/MIN/1.73M2
GLUCOSE BLD-MCNC: 103 MG/DL (ref 70–125)
POTASSIUM BLD-SCNC: 5 MMOL/L (ref 3.5–5)
SODIUM SERPL-SCNC: 142 MMOL/L (ref 136–145)

## 2020-12-17 ENCOUNTER — COMMUNICATION - HEALTHEAST (OUTPATIENT)
Dept: CARDIOLOGY | Facility: CLINIC | Age: 75
End: 2020-12-17

## 2020-12-22 ENCOUNTER — RECORDS - HEALTHEAST (OUTPATIENT)
Dept: ADMINISTRATIVE | Facility: OTHER | Age: 75
End: 2020-12-22

## 2020-12-22 ENCOUNTER — COMMUNICATION - HEALTHEAST (OUTPATIENT)
Dept: ANTICOAGULATION | Facility: CLINIC | Age: 75
End: 2020-12-22

## 2020-12-22 DIAGNOSIS — I48.11 LONGSTANDING PERSISTENT ATRIAL FIBRILLATION (H): ICD-10-CM

## 2020-12-22 LAB — INR PPP: 3 (ref 0.9–1.1)

## 2020-12-29 ENCOUNTER — COMMUNICATION - HEALTHEAST (OUTPATIENT)
Dept: ANTICOAGULATION | Facility: CLINIC | Age: 75
End: 2020-12-29

## 2020-12-29 DIAGNOSIS — I48.11 LONGSTANDING PERSISTENT ATRIAL FIBRILLATION (H): ICD-10-CM

## 2020-12-29 LAB — INR PPP: 3.2 (ref 0.9–1.1)

## 2021-01-05 ENCOUNTER — RECORDS - HEALTHEAST (OUTPATIENT)
Dept: ADMINISTRATIVE | Facility: OTHER | Age: 76
End: 2021-01-05

## 2021-01-05 ENCOUNTER — COMMUNICATION - HEALTHEAST (OUTPATIENT)
Dept: ANTICOAGULATION | Facility: CLINIC | Age: 76
End: 2021-01-05

## 2021-01-05 DIAGNOSIS — I48.11 LONGSTANDING PERSISTENT ATRIAL FIBRILLATION (H): ICD-10-CM

## 2021-01-05 LAB — INR PPP: 2.7 (ref 0.9–1.1)

## 2021-01-12 ENCOUNTER — COMMUNICATION - HEALTHEAST (OUTPATIENT)
Dept: ANTICOAGULATION | Facility: CLINIC | Age: 76
End: 2021-01-12

## 2021-01-12 ENCOUNTER — COMMUNICATION - HEALTHEAST (OUTPATIENT)
Dept: CARDIOLOGY | Facility: CLINIC | Age: 76
End: 2021-01-12

## 2021-01-12 DIAGNOSIS — I42.9 CARDIOMYOPATHY, IDIOPATHIC (H): ICD-10-CM

## 2021-01-12 DIAGNOSIS — I48.11 LONGSTANDING PERSISTENT ATRIAL FIBRILLATION (H): ICD-10-CM

## 2021-01-12 DIAGNOSIS — I48.91 A-FIB (H): ICD-10-CM

## 2021-01-12 DIAGNOSIS — I42.9 IDIOPATHIC CARDIOMYOPATHY (H): ICD-10-CM

## 2021-01-12 DIAGNOSIS — I48.19 PERSISTENT ATRIAL FIBRILLATION (H): ICD-10-CM

## 2021-01-12 DIAGNOSIS — Z86.79 HISTORY OF SICK SINUS SYNDROME: ICD-10-CM

## 2021-01-12 LAB — INR PPP: 2.7 (ref 0.9–1.1)

## 2021-01-19 ENCOUNTER — COMMUNICATION - HEALTHEAST (OUTPATIENT)
Dept: ANTICOAGULATION | Facility: CLINIC | Age: 76
End: 2021-01-19

## 2021-01-19 DIAGNOSIS — I48.11 LONGSTANDING PERSISTENT ATRIAL FIBRILLATION (H): ICD-10-CM

## 2021-01-19 LAB — INR PPP: 3 (ref 0.9–1.1)

## 2021-01-20 ENCOUNTER — COMMUNICATION - HEALTHEAST (OUTPATIENT)
Dept: CARDIOLOGY | Facility: CLINIC | Age: 76
End: 2021-01-20

## 2021-01-20 DIAGNOSIS — E83.42 HYPOMAGNESEMIA: ICD-10-CM

## 2021-01-26 ENCOUNTER — COMMUNICATION - HEALTHEAST (OUTPATIENT)
Dept: ANTICOAGULATION | Facility: CLINIC | Age: 76
End: 2021-01-26

## 2021-01-26 DIAGNOSIS — I48.11 LONGSTANDING PERSISTENT ATRIAL FIBRILLATION (H): ICD-10-CM

## 2021-01-26 LAB — INR PPP: 2.5 (ref 0.9–1.1)

## 2021-02-02 ENCOUNTER — COMMUNICATION - HEALTHEAST (OUTPATIENT)
Dept: ANTICOAGULATION | Facility: CLINIC | Age: 76
End: 2021-02-02

## 2021-02-02 DIAGNOSIS — I48.11 LONGSTANDING PERSISTENT ATRIAL FIBRILLATION (H): ICD-10-CM

## 2021-02-02 LAB — INR PPP: 3.3 (ref 0.9–1.1)

## 2021-02-09 ENCOUNTER — COMMUNICATION - HEALTHEAST (OUTPATIENT)
Dept: ANTICOAGULATION | Facility: CLINIC | Age: 76
End: 2021-02-09

## 2021-02-09 ENCOUNTER — AMBULATORY - HEALTHEAST (OUTPATIENT)
Dept: CARDIOLOGY | Facility: CLINIC | Age: 76
End: 2021-02-09

## 2021-02-09 DIAGNOSIS — I10 ESSENTIAL HYPERTENSION: ICD-10-CM

## 2021-02-09 DIAGNOSIS — Z95.810 ICD (IMPLANTABLE CARDIOVERTER-DEFIBRILLATOR) IN PLACE: ICD-10-CM

## 2021-02-09 DIAGNOSIS — I48.11 LONGSTANDING PERSISTENT ATRIAL FIBRILLATION (H): ICD-10-CM

## 2021-02-09 LAB
HCC DEVICE COMMENTS: NORMAL
HCC DEVICE IMPLANTING PROVIDER: NORMAL
HCC DEVICE MANUFACTURE: NORMAL
HCC DEVICE MODEL: NORMAL
HCC DEVICE SERIAL NUMBER: NORMAL
HCC DEVICE TYPE: NORMAL
INR PPP: 2.9 (ref 0.9–1.1)

## 2021-02-16 ENCOUNTER — COMMUNICATION - HEALTHEAST (OUTPATIENT)
Dept: ANTICOAGULATION | Facility: CLINIC | Age: 76
End: 2021-02-16

## 2021-02-16 DIAGNOSIS — I48.11 LONGSTANDING PERSISTENT ATRIAL FIBRILLATION (H): ICD-10-CM

## 2021-02-16 LAB — INR PPP: 2 (ref 0.9–1.1)

## 2021-02-23 ENCOUNTER — COMMUNICATION - HEALTHEAST (OUTPATIENT)
Dept: ANTICOAGULATION | Facility: CLINIC | Age: 76
End: 2021-02-23

## 2021-02-23 DIAGNOSIS — I48.11 LONGSTANDING PERSISTENT ATRIAL FIBRILLATION (H): ICD-10-CM

## 2021-02-23 LAB — INR PPP: 2.1 (ref 0.9–1.1)

## 2021-03-02 ENCOUNTER — COMMUNICATION - HEALTHEAST (OUTPATIENT)
Dept: ANTICOAGULATION | Facility: CLINIC | Age: 76
End: 2021-03-02

## 2021-03-02 DIAGNOSIS — I48.11 LONGSTANDING PERSISTENT ATRIAL FIBRILLATION (H): ICD-10-CM

## 2021-03-02 LAB — INR PPP: 2.5 (ref 0.9–1.1)

## 2021-03-05 ENCOUNTER — COMMUNICATION - HEALTHEAST (OUTPATIENT)
Dept: CARDIOLOGY | Facility: CLINIC | Age: 76
End: 2021-03-05

## 2021-03-09 ENCOUNTER — COMMUNICATION - HEALTHEAST (OUTPATIENT)
Dept: ANTICOAGULATION | Facility: CLINIC | Age: 76
End: 2021-03-09

## 2021-03-09 DIAGNOSIS — I48.11 LONGSTANDING PERSISTENT ATRIAL FIBRILLATION (H): ICD-10-CM

## 2021-03-09 LAB — INR PPP: 1.6 (ref 0.9–1.1)

## 2021-03-16 ENCOUNTER — COMMUNICATION - HEALTHEAST (OUTPATIENT)
Dept: ANTICOAGULATION | Facility: CLINIC | Age: 76
End: 2021-03-16

## 2021-03-16 DIAGNOSIS — I48.11 LONGSTANDING PERSISTENT ATRIAL FIBRILLATION (H): ICD-10-CM

## 2021-03-16 LAB — INR PPP: 2.5 (ref 0.9–1.1)

## 2021-03-23 ENCOUNTER — COMMUNICATION - HEALTHEAST (OUTPATIENT)
Dept: ANTICOAGULATION | Facility: CLINIC | Age: 76
End: 2021-03-23

## 2021-03-23 ENCOUNTER — RECORDS - HEALTHEAST (OUTPATIENT)
Dept: ADMINISTRATIVE | Facility: OTHER | Age: 76
End: 2021-03-23

## 2021-03-23 DIAGNOSIS — I48.11 LONGSTANDING PERSISTENT ATRIAL FIBRILLATION (H): ICD-10-CM

## 2021-03-23 LAB — INR PPP: 2.4 (ref 0.9–1.1)

## 2021-03-30 ENCOUNTER — RECORDS - HEALTHEAST (OUTPATIENT)
Dept: ADMINISTRATIVE | Facility: OTHER | Age: 76
End: 2021-03-30

## 2021-03-30 ENCOUNTER — COMMUNICATION - HEALTHEAST (OUTPATIENT)
Dept: ANTICOAGULATION | Facility: CLINIC | Age: 76
End: 2021-03-30

## 2021-03-30 DIAGNOSIS — I48.11 LONGSTANDING PERSISTENT ATRIAL FIBRILLATION (H): ICD-10-CM

## 2021-03-30 LAB — INR PPP: 2.2 (ref 0.9–1.1)

## 2021-04-06 ENCOUNTER — AMBULATORY - HEALTHEAST (OUTPATIENT)
Dept: ANTICOAGULATION | Facility: CLINIC | Age: 76
End: 2021-04-06

## 2021-04-06 ENCOUNTER — RECORDS - HEALTHEAST (OUTPATIENT)
Dept: ADMINISTRATIVE | Facility: OTHER | Age: 76
End: 2021-04-06

## 2021-04-06 LAB — INR PPP: 2.2 (ref 0.9–1.1)

## 2021-04-13 ENCOUNTER — COMMUNICATION - HEALTHEAST (OUTPATIENT)
Dept: ANTICOAGULATION | Facility: CLINIC | Age: 76
End: 2021-04-13

## 2021-04-13 ENCOUNTER — RECORDS - HEALTHEAST (OUTPATIENT)
Dept: ADMINISTRATIVE | Facility: OTHER | Age: 76
End: 2021-04-13

## 2021-04-13 DIAGNOSIS — I48.91 ATRIAL FIBRILLATION (H): ICD-10-CM

## 2021-04-13 LAB — INR PPP: 2.1 (ref 0.9–1.1)

## 2021-04-20 ENCOUNTER — COMMUNICATION - HEALTHEAST (OUTPATIENT)
Dept: ANTICOAGULATION | Facility: CLINIC | Age: 76
End: 2021-04-20

## 2021-04-20 ENCOUNTER — RECORDS - HEALTHEAST (OUTPATIENT)
Dept: ADMINISTRATIVE | Facility: OTHER | Age: 76
End: 2021-04-20

## 2021-04-20 DIAGNOSIS — I48.91 ATRIAL FIBRILLATION (H): ICD-10-CM

## 2021-04-20 LAB — INR PPP: 2.1 (ref 0.9–1.1)

## 2021-04-21 ENCOUNTER — COMMUNICATION - HEALTHEAST (OUTPATIENT)
Dept: CARDIOLOGY | Facility: CLINIC | Age: 76
End: 2021-04-21

## 2021-04-21 DIAGNOSIS — E83.42 HYPOMAGNESEMIA: ICD-10-CM

## 2021-04-27 ENCOUNTER — RECORDS - HEALTHEAST (OUTPATIENT)
Dept: ADMINISTRATIVE | Facility: OTHER | Age: 76
End: 2021-04-27

## 2021-04-27 ENCOUNTER — COMMUNICATION - HEALTHEAST (OUTPATIENT)
Dept: ANTICOAGULATION | Facility: CLINIC | Age: 76
End: 2021-04-27

## 2021-04-27 DIAGNOSIS — I48.91 ATRIAL FIBRILLATION (H): ICD-10-CM

## 2021-04-27 LAB — INR PPP: 2.3 (ref 0.9–1.1)

## 2021-05-04 ENCOUNTER — COMMUNICATION - HEALTHEAST (OUTPATIENT)
Dept: ANTICOAGULATION | Facility: CLINIC | Age: 76
End: 2021-05-04

## 2021-05-04 ENCOUNTER — RECORDS - HEALTHEAST (OUTPATIENT)
Dept: ADMINISTRATIVE | Facility: OTHER | Age: 76
End: 2021-05-04

## 2021-05-04 DIAGNOSIS — I48.91 ATRIAL FIBRILLATION (H): ICD-10-CM

## 2021-05-04 LAB — INR PPP: 2 (ref 0.9–1.1)

## 2021-05-10 ENCOUNTER — AMBULATORY - HEALTHEAST (OUTPATIENT)
Dept: PULMONOLOGY | Facility: OTHER | Age: 76
End: 2021-05-10

## 2021-05-10 ENCOUNTER — OFFICE VISIT - HEALTHEAST (OUTPATIENT)
Dept: PULMONOLOGY | Facility: OTHER | Age: 76
End: 2021-05-10

## 2021-05-10 DIAGNOSIS — J44.9 COPD, GROUP B, BY GOLD 2017 CLASSIFICATION (H): ICD-10-CM

## 2021-05-10 DIAGNOSIS — R06.09 DOE (DYSPNEA ON EXERTION): ICD-10-CM

## 2021-05-11 ENCOUNTER — RECORDS - HEALTHEAST (OUTPATIENT)
Dept: ADMINISTRATIVE | Facility: OTHER | Age: 76
End: 2021-05-11

## 2021-05-11 ENCOUNTER — AMBULATORY - HEALTHEAST (OUTPATIENT)
Dept: CARDIOLOGY | Facility: CLINIC | Age: 76
End: 2021-05-11

## 2021-05-11 ENCOUNTER — COMMUNICATION - HEALTHEAST (OUTPATIENT)
Dept: ANTICOAGULATION | Facility: CLINIC | Age: 76
End: 2021-05-11

## 2021-05-11 DIAGNOSIS — I42.1 HYPERTROPHIC OBSTRUCTIVE CARDIOMYOPATHY (H): ICD-10-CM

## 2021-05-11 DIAGNOSIS — Z95.810 ICD (IMPLANTABLE CARDIOVERTER-DEFIBRILLATOR) IN PLACE: ICD-10-CM

## 2021-05-11 DIAGNOSIS — Z98.890 STATUS POST CATHETER ABLATION OF ATRIAL FIBRILLATION: ICD-10-CM

## 2021-05-11 DIAGNOSIS — I48.91 ATRIAL FIBRILLATION, UNSPECIFIED TYPE (H): ICD-10-CM

## 2021-05-11 DIAGNOSIS — I48.91 ATRIAL FIBRILLATION (H): ICD-10-CM

## 2021-05-11 LAB
HCC DEVICE COMMENTS: NORMAL
HCC DEVICE IMPLANTING PROVIDER: NORMAL
HCC DEVICE MANUFACTURE: NORMAL
HCC DEVICE MODEL: NORMAL
HCC DEVICE SERIAL NUMBER: NORMAL
HCC DEVICE TYPE: NORMAL
INR PPP: 2 (ref 0.9–1.1)

## 2021-05-11 ASSESSMENT — MIFFLIN-ST. JEOR: SCORE: 2121.99

## 2021-05-13 ENCOUNTER — RECORDS - HEALTHEAST (OUTPATIENT)
Dept: ADMINISTRATIVE | Facility: OTHER | Age: 76
End: 2021-05-13

## 2021-05-17 ENCOUNTER — COMMUNICATION - HEALTHEAST (OUTPATIENT)
Dept: CARDIOLOGY | Facility: CLINIC | Age: 76
End: 2021-05-17

## 2021-05-18 ENCOUNTER — RECORDS - HEALTHEAST (OUTPATIENT)
Dept: ADMINISTRATIVE | Facility: OTHER | Age: 76
End: 2021-05-18

## 2021-05-18 ENCOUNTER — AMBULATORY - HEALTHEAST (OUTPATIENT)
Dept: ANTICOAGULATION | Facility: CLINIC | Age: 76
End: 2021-05-18

## 2021-05-18 LAB — INR PPP: 2.2 (ref 0.9–1.1)

## 2021-05-20 ENCOUNTER — COMMUNICATION - HEALTHEAST (OUTPATIENT)
Dept: PULMONOLOGY | Facility: OTHER | Age: 76
End: 2021-05-20

## 2021-05-24 ENCOUNTER — RECORDS - HEALTHEAST (OUTPATIENT)
Dept: ADMINISTRATIVE | Facility: CLINIC | Age: 76
End: 2021-05-24

## 2021-05-25 ENCOUNTER — COMMUNICATION - HEALTHEAST (OUTPATIENT)
Dept: ANTICOAGULATION | Facility: CLINIC | Age: 76
End: 2021-05-25

## 2021-05-25 DIAGNOSIS — I48.91 ATRIAL FIBRILLATION (H): ICD-10-CM

## 2021-05-25 LAB — INR PPP: 2.2 (ref 0.9–1.1)

## 2021-05-27 VITALS
RESPIRATION RATE: 16 BRPM | WEIGHT: 288 LBS | HEART RATE: 86 BPM | DIASTOLIC BLOOD PRESSURE: 68 MMHG | HEIGHT: 75 IN | BODY MASS INDEX: 35.81 KG/M2 | SYSTOLIC BLOOD PRESSURE: 116 MMHG

## 2021-05-27 VITALS
DIASTOLIC BLOOD PRESSURE: 72 MMHG | SYSTOLIC BLOOD PRESSURE: 112 MMHG | HEART RATE: 63 BPM | OXYGEN SATURATION: 100 % | WEIGHT: 289.7 LBS

## 2021-05-27 NOTE — PROGRESS NOTES
INR 2.9 at home. After talking with pt and discussing history of greens/salads and medication change. Pt will  continue  with current diet and dosing of Warfarin.  Continue with moderation of Vit K and green leafy vegetables. Cautioned to call with increase bruising or bleeding. Reminded to call with medication change especially antibiotic. Call with any questions or concerns or any up coming procedures. Cautioned about using Herbal medication.

## 2021-05-27 NOTE — PATIENT INSTRUCTIONS - HE
Reviewed with pt no changes in Coumadin dose, pt denies any s/s of bleeding, pt has no further questions at this time, pt has clinics contact information for further concerns/questions, pt was instructed when to obtain next INR appointment and verbalized understanding

## 2021-05-27 NOTE — PATIENT INSTRUCTIONS - HE
Continue current management dosing of Warfarin. Continue  diet of moderate Vitamin K intake. Discussed with pt the need to call with questions or concerns or any change in medication especially herbal medication or OTC. Call with increased bleeding or bruising or any upcoming procedures.

## 2021-05-27 NOTE — TELEPHONE ENCOUNTER
Wife called to say it is time to get in to see Dr. Payton and Buck is not feeling well. Said he is again having a small amount of hemoptysis and is very tired. Would like to get in sooner rather then later. Transferred to scheduling and informed was ok to use a RAC appointment if necessary.

## 2021-05-27 NOTE — PROGRESS NOTES
Dear Dr. Renee,   I saw Buck today for COPD and recurrent hemoptysis. He has been coughing up blood recently, but seems to be stable. He did have an ER visit last October but his hemoptysis eventually resolved. I'm getting a CT scan of his chest to see if there are any problematic areas that are prone to bleeding, but I wanted to discuss with you whether a lower INR goal (1.5-2?) or changing from coumadin to full strength ASA would be feasible, given his issues with recurrent hemoptysis? I told him to continue the coumadin for now and that I would discuss with you.   Thanks for your time.     Hugh Payton MD (Avi)   [facility]   Pager [phone]   Clinic [phone]   Cell [phone]      Long history atrial fibrillation has had ablations and cardioversions etc. now in chronic atrial fibrillation  Underwent attempt to occlude left atrial appendage but left atrial appendage was too large-34/35 mm  There going to bring out a new left atrial appendage occlusion device that is larger; but  may not be available for 1-2 years  We will put him on her list for procedure once that the new device is available  I would favor stopping aspirin  And run INR around 2

## 2021-05-27 NOTE — TELEPHONE ENCOUNTER
Spoke with Buck on the phone. Reviewed recent CT chest results, which showed a new 1.3cm nodule in the LLL that was not present last year. Likely infectious/inflammatory.   There were no other concerning findings such as cavities are severe bronchiectatic areas to explain the hemoptysis.  He has not coughed up any blood since our last visit.  I would like to treat this new nodule as a potential infection and am recommending 7 days of levofloxacin and a repeat CT of the chest in 3 months.   I have also communicated with Dr. Renee, and he agrees with lower INR target (closer to 2 rather than 2-3) as well as stopping the Aspirin. I did mention this to Buck as well and he's in agreement. He'll follow up at the coumadin clinic and with heart care as scheduled.  All questions answered.  I'll see him in July for follow up.  He'll call in the interim is he has any worsening hemoptysis.     Hugh (Woodrow Payton MD  [facility]  Pager [phone]  Clinic [phone]

## 2021-05-27 NOTE — PROGRESS NOTES
Pulmonary Clinic Follow-up Visit    Assessment/Plan:  72 year old male with a history of tobacco dependence in remission, CAD s/p PCI/stents, afib s/p PVL with ICD/PPM on anticoagulation, history of cavitary lesion and extensive pneumonia in LLL in Oct 2017, subsequent recurrent LLL pneumonia, since resolved, presenting for follow up. He had some recurrent hemoptysis a few ago but this seems to have resolved. His dyspnea on exertion and exercise tolerance are stable. No recent hospitalizations or ER visits. Lung exam, vitals reassuring today. We reviewed his prior imaging and he does have some bronchiectasis and scarring in the LLL which may be prone to bleeding, especially given his anticoagulation status.    Plan:  #Hemoptysis, recurrent:   - CT chest with IV contrast  - OK to cont coumadin for now; pending above result may need to discuss changing INR goal vs. Alternative anticoagulatants (e.g. ASA)  - He knows to call or go to the ER if worsening hemoptysis, shortness of breath    #COPD, GOLD class B (CAT >10, few exacerbations/low risk for hospitalization). Minimal smoking history so this is probably due to his work as a   - continue budesonide-formoterol 160-4.5 mcg two inhalations BID WITH SPACER; rinse/gargle/spit water after use. No changes today but will consider tapering at next visit.   - continue tiotropium HandiHaler one inhalation daily  - Cont albuterol as needed  - encouraged exercise, weight loss as able  - UTD w/ pneumonia and flu vaccines.    Follow up in 3 months. I will call him with CT scan results. He He will call in the interim if he has any questions/concerns.    CCx: follow up of hemoptysis, and COPD GOLD class B    HPI: Interim history: I last saw Buck on 10/25/2018. Since that time, he's been having some hemoptysis. It was really bad a few weeks ago, he was coughing up some bright red blood a few days in a row. It was not as bad as last Fall when he had to go to the ER. He  reports stable dyspnea on exertion since our last visit. Taking symbicort and spiriva faithfully. Rare use of rescue inhaler.   Completed pulmonary rehab last Fall. He was tested for exertional hypoxemia and says he did not desaturate with activity.  He doesn't exercise much, gets winded with one flight of stairs. 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  No chest infections, exacerbations or ER visits/hospitalizations since I last saw him.  He is on coumadin. No issues with INR being too high or too low.  Negative PSG for MAYTE in 2015.     ROS:  A 12-system review was obtained and was negative with the exception of the symptoms endorsed in the history of present illness.    PMH:  Past Medical History:   Diagnosis Date     Anemia      BPH (benign prostatic hyperplasia)      COPD (chronic obstructive pulmonary disease) (H)      Coronary artery disease due to calcified coronary lesion      Dyslipidemia, goal LDL below 70      Essential hypertension      History of cardiomyopathy      History of transfusion      Persistent atrial fibrillation (H)      Pneumonia of left lower lobe due to infectious organism (H) 10/4/2017     Skin cancer of trunk      Status post catheter ablation of atrial fibrillation 6/7/2017    PVI 4-2011 (Cryo/PVI + roof line + CTI line) Re-do PVI 7-2011 (RFA/PVI + CFE + VIDYA + confirmed CTI line)     Ventricular tachycardia (H)        PSH:  Past Surgical History:   Procedure Laterality Date     CARDIAC DEFIBRILLATOR PLACEMENT       CARDIOVERSION  07/11/2018    x20, last 2/12/15, 10/2015, 11/18/16, 6/16/17 by Lauren Foster CNP     CARDIOVERSION  07/11/2018     COLONOSCOPY N/A 4/28/2017    Procedure: COLONOSCOPY with 2 ascending polyps and 1 transverse polyp;  Surgeon: Jose Whittington MD;  Location: [facility] GI;  Service:      CV CORONARY ANGIOGRAM N/A 9/20/2017    Procedure: Coronary Angiogram;  Surgeon: Sergio Cervantes MD;  Location: [facility]  Lab;  Service:      EP ICD INSERT       FRACTURE SURGERY Left     wrist     INGUINAL HERNIA REPAIR Left 1967    while in the [employer] in [place] after 13 month in [place]     INSERT / REPLACE / REMOVE PACEMAKER       left hand surgery---tendon repair       DC ABLATE HEART DYSRHYTHM FOCUS  04/2011    Catheter Ablation Atrial Fibrillation PVI Apr 2011 (Cryo+RF-PVI + roof line + CTI line)     DC ABLATE HEART DYSRHYTHM FOCUS  07/2011    Re-do PVI Jul 2011 (RFA-PVI + CFE + VIDYA + confirmation of CTI line)     DC MYERS W/O FACETEC FORAMOT/DSKC 1/2 VRT SEG, CERVICAL      Laminectomy Lumbar;  Recorded: 03/09/2012;     TOTAL SHOULDER REPLACEMENT Right 03/03/2016    Dr. Abernathy of [facility]       Allergies:  Allergies   Allergen Reactions     Adhesive Other (See Comments)     ADHESIVE TAPE; SKIN IRRITATION  Foam tape:  Skin removed with tape removal     Amiodarone      ADVERSE REACTION.  Sunlight sensitivity.     Lisinopril Cough       Family HX:  Family History   Problem Relation Age of Onset     Cancer Mother         leukemia     Cancer Father         bladder     Cancer Sister         breast with lung met.     Aneurysm Sister      CABG Brother      CABG Brother      Valvular heart disease Brother         valve replacement       Social Hx:  Social History     Socioeconomic History     Marital status:      Spouse name: Neela     Number of children: Not on file     Years of education: 12     Highest education level: Not on file   Occupational History     Occupation:      Employer: RETIRED     Occupation:  police     Comment: [employer]   Social Needs     Financial resource strain: Not on file     Food insecurity:     Worry: Not on file     Inability: Not on file     Transportation needs:     Medical: Not on file     Non-medical: Not on file   Tobacco Use     Smoking status: Former Smoker     Packs/day: 1.00     Years: 4.00     Pack years: 4.00     Types: Cigarettes     Last attempt to quit:  1968     Years since quittin.3     Smokeless tobacco: Never Used   Substance and Sexual Activity     Alcohol use: Yes     Alcohol/week: 1.2 oz     Types: 2 Cans of beer per week     Comment: 1 beer per week     Drug use: No     Sexual activity: Yes     Partners: Female     Birth control/protection: Post-menopausal   Lifestyle     Physical activity:     Days per week: Not on file     Minutes per session: Not on file     Stress: Not on file   Relationships     Social connections:     Talks on phone: Not on file     Gets together: Not on file     Attends [employer] service: Not on file     Active member of club or organization: Not on file     Attends meetings of clubs or organizations: Not on file     Relationship status: Not on file     Intimate partner violence:     Fear of current or ex partner: Not on file     Emotionally abused: Not on file     Physically abused: Not on file     Forced sexual activity: Not on file   Other Topics Concern     Not on file   Social History Narrative     Not on file       Current Meds:  Current Outpatient Medications   Medication Sig Dispense Refill     ACETAMINOPHEN (TYLENOL ORAL) Take 1,000 mg by mouth 2 (two) times a day.        amoxicillin (AMOXIL) 500 MG tablet Take prior to the Dentist       ascorbic acid (VITAMIN C) 1000 MG tablet Take 1,000 mg by mouth daily.       aspirin 81 MG EC tablet Take 1 tablet (81 mg total) by mouth daily. 30 tablet 11     atorvastatin (LIPITOR) 40 MG tablet Take 40 mg by mouth at bedtime.       budesonide-formoterol (SYMBICORT) 160-4.5 mcg/actuation inhaler Inhale 2 puffs 2 (two) times a day.       co-enzyme Q-10 30 mg capsule Take 100 mg by mouth daily.       furosemide (LASIX) 20 MG tablet TAKE 1 TABLET(20 MG) BY MOUTH DAILY 90 tablet 3     losartan (COZAAR) 50 MG tablet TAKE 1 TABLET BY MOUTH EVERY DAY 90 tablet 2     magnesium chloride (MAG 64) 64 mg TbEC delayed-release tablet Take 1 tablet (64 mg total) by mouth 2 (two) times a day. 180  "tablet 5     multivitamin (MULTIVITAMIN) per tablet Take 1 tablet by mouth daily.       omega 3-dha-epa-fish oil (FISH OIL) 1,000 mg (120 mg-180 mg) cap Take 2 tablets by mouth 2 (two) times a day.        polyethylene glycol (MIRALAX) 17 gram packet Take 17 g by mouth daily.       sotalol (BETAPACE) 80 MG tablet Take 160 mg by mouth 2 (two) times a day.  5     tiotropium (SPIRIVA) 18 mcg inhalation capsule Place 18 mcg into inhaler and inhale daily.       vardenafil (LEVITRA) 10 MG tablet Take 10 mg by mouth daily as needed for erectile dysfunction.       VITAMIN D2 50,000 unit capsule Take 50,000 Units by mouth 2 (two) times a week. SUN and WED  3     warfarin (COUMADIN/JANTOVEN) 2.5 MG tablet TAKE 5 MG TABLETS BY MOUTH M W F AND 2.5 MG TABLETS ALL OTHER DAYS. 90 tablet 0     warfarin (COUMADIN/JANTOVEN) 5 MG tablet Take as directed  0     albuterol (PROAIR HFA;PROVENTIL HFA;VENTOLIN HFA) 90 mcg/actuation inhaler Inhale 2 puffs every 6 (six) hours as needed for wheezing. 1 Inhaler 11     No current facility-administered medications for this visit.        Physical Exam:  /60   Pulse 77   Ht 6' 3\" (1.905 m)   Wt (!) 282 lb (127.9 kg)   SpO2 92%   BMI 35.25 kg/m    Gen: alert, oriented, no distress  HEENT: nasal turbinates are unremarkable, no oropharyngeal lesions, no cervical or supraclavicular lymphadenopathy  CV: RRR, no M/G/R  Resp: CTAB, no focal crackles or wheezes  Abd: soft, nontender, no palpable organomegaly  Skin: no apparent rashes  Ext: no cyanosis, clubbing or edema  Neuro: alert, nonfocal    Labs:  Reviewed  Dec 2018  Chem panel wnl  TSH wnl  hgb 12.1 Oct 2018    Previous testing  DONNA neg  blasto neg  Histo testing neg  c-ANCA neg  HIV neg  RF neg    Bronch/LLL BAL cultures neg      Imaging studies:  CT chest April 2018  IMPRESSION:   CONCLUSION:  1.  Mild scarring and slight bronchiectasis present at both lung bases. No active pneumonia identified.    PFTs   FEV1/FVC is 0.56 and is " reduced.  FEV1 is 73% predicted and is reduced.  FVC is 96% predicted and normal.  There was no improvement in spirometry after a single inhaled dose of bronchodilator.  TLC is 99% predicted and is normal.  RV is 113% predicted and is normal.  DLCO is 93% predicted and is normal when it   is corrected for hemoglobin.     Impression:  Full Pulmonary Function Test is abnormal.  PFTs are consistent with mild obstructive disease.  Spirometry is not consistent with reversibility.  There is no hyperinflation.  There is no air-trapping.  Diffusion capacity when corrected for hemoglobin is normal.  Declines in both FEV1 and TLC since 2009 with overall preservation of diffusing capacity.    Hugh (Woodrow Payton MD  [facility]  Pager [phone]  Clinic [phone]

## 2021-05-27 NOTE — PATIENT INSTRUCTIONS - HE
INR 2.9 Continue current management dosing of Warfarin. Continue  diet of moderate Vitamin K intake. Discussed with pt the need to call with questions or concerns or any change in medication especially herbal medication or OTC. Call with increased bleeding or bruising or any upcoming procedures.

## 2021-05-28 ENCOUNTER — RECORDS - HEALTHEAST (OUTPATIENT)
Dept: ADMINISTRATIVE | Facility: CLINIC | Age: 76
End: 2021-05-28

## 2021-05-28 NOTE — PROGRESS NOTES
INR 3.3 at home. Pt to keep INR closer to 2. Will decrease dose to 5 mg Friday and 2.5 Mg all other days retest in one week. After talking with pt and discussing history of greens/salads and medication change. Pt will  continue  with current diet and dosing of Warfarin.  Continue with moderation of Vit K and green leafy vegetables. Cautioned to call with increase bruising or bleeding. Reminded to call with medication change especially antibiotic. Call with any questions or concerns or any up coming procedures. Cautioned about using Herbal medication.

## 2021-05-28 NOTE — TELEPHONE ENCOUNTER
Request has been submitted via [facility]. Waiting for questions to generate before completing PA.

## 2021-05-28 NOTE — PROGRESS NOTES
INR 2.4 take 5 mg Monday and 2.5 mg all other day. After talking with pt and discussing history of greens/salads and medication change. Pt will  continue  with current diet and dosing of Warfarin.  Continue with moderation of Vit K and green leafy vegetables. Cautioned to call with increase bruising or bleeding. Reminded to call with medication change especially antibiotic. Call with any questions or concerns or any up coming procedures. Cautioned about using Herbal medication.  Retest in one week.

## 2021-05-28 NOTE — PROGRESS NOTES
INR 1.6 off antibiotics and no longer coughing up blood. Feeling better per wife. Will increase dose to 5 mg Monday and Friday and 2.5 mg all other days. Retest in 1 week. After talking with pt and discussing history of greens/salads and medication change. Pt will  continue  with current diet and dosing of Warfarin.  Continue with moderation of Vit K and green leafy vegetables. Cautioned to call with increase bruising or bleeding. Reminded to call with medication change especially antibiotic. Call with any questions or concerns or any up coming procedures. Cautioned about using Herbal medication.

## 2021-05-28 NOTE — TELEPHONE ENCOUNTER
Prior Authorization Request  Who s requesting:  Pharmacy  Pharmacy Name and Location: [facility] [address]  Medication Name: Levofloxacin 750 mg  Insurance Plan: Medicare  Insurance Member ID Number:  [id number]  Informed patient that prior authorizations can take up to 10 business days for response:   Yes  Okay to leave a detailed message: Yes

## 2021-05-28 NOTE — TELEPHONE ENCOUNTER
Anticoagulation Transition of Care    Buck Sinclair was referred to transition management to [facility].    Warfarin indication: Atrial Fibrillation   Current INR goal 2.0-3.0   Date of last cardiology office visit 3/11/19     INR goal range standard for indication(s). Updated INR standing orders and anticoagulation referral renewal placed. Please contact the anticoagulation clinic if you have questions on the parameters of the renewal.    Gisele Prince RN

## 2021-05-28 NOTE — TELEPHONE ENCOUNTER
Central PA team  [phone]  Pool: HE PA MED ([id number])          PA has been initiated.       PA form completed and faxed insurance via Cover My Meds     Key:  [id number]     Medication:  LEVOFLOXACIN 750MG    Insurance:  [employer]        Response will be received via fax and may take up to 5-10 business days depending on plan

## 2021-05-28 NOTE — PATIENT INSTRUCTIONS - HE
INR 2.4 take 5 mg on Monday and 2.5 mg all other days. After talking with pt and discussing history of greens/salads and medication change. Pt will  continue  with current diet and dosing of Warfarin.  Continue with moderation of Vit K and green leafy vegetables. Cautioned to call with increase bruising or bleeding. Reminded to call with medication change especially antibiotic. Call with any questions or concerns or any up coming procedures. Cautioned about using Herbal medication.

## 2021-05-28 NOTE — PROGRESS NOTES
INR 1.8 at home. Still not feeling the best but will continue on current dose of Warfarin 5 mg Monday and 2.5 mg all other days and retest in one week. Pt is off antibiotics. After talking with pt and discussing history of greens/salads and medication change. Pt will  continue  with current diet and dosing of Warfarin.  Continue with moderation of Vit K and green leafy vegetables. Cautioned to call with increase bruising or bleeding. Reminded to call with medication change especially antibiotic. Call with any questions or concerns or any up coming procedures. Cautioned about using Herbal medication.

## 2021-05-29 NOTE — TELEPHONE ENCOUNTER
ANTICOAGULATION  MANAGEMENT    Assessment     Today's INR result of 1.7 is Subtherapeutic (goal INR of 2.0-3.0)        Warfarin taken as previously instructed    No new diet changes affecting INR    No new medication/supplements affecting INR    Continues to tolerate warfarin with no reported s/s of bleeding or thromboembolism     Previous INR was Subtherapeutic     Neela introduced to [facility] Program    Plan:     Spoke with Neela, patient's spouse regarding INR result and instructed:     Warfarin Dosing Instructions:  Change warfarin dose to    5 mg every Mon, Wed, Fri; 2.5 mg all other days      (11 % change)    Instructed patient to follow up no later than: one week with home monitor.  eNela reported that they will be going on a road trip and to contact her on her mobile number next week.    Education provided: importance of therapeutic range, target INR goal and significance of current INR result, monitoring for clotting signs and symptoms, when to seek medical attention/emergency care and travel related clotting risk and prevention    Neela verbalizes understanding and agrees to warfarin dosing plan.    Instructed to call the [facility] for any changes, questions or concerns. (#[phone])   ?   Gisele Prince RN    Subjective/Objective:      Buck Sinclair, a 74 y.o. male is on warfarin.     Neela Jane reports:     Home warfarin dose: verbally confirmed home dose with Neela and updated on anticoagulation calendar     Missed doses: No     Medication changes:  Reported that the patient completed taking antibiotic over 2 weeks ago and could not remember the name of the medication at this time.     S/S of bleeding or thromboembolism:  No     New Injury or illness:  No     Changes in diet or alcohol consumption:  No     Upcoming surgery, procedure or cardioversion:  No    Anticoagulation Episode Summary     Current INR goal:   2.0-3.0   TTR:   68.2 % (4.4 y)   Next INR check:   5/28/2019   INR from last  check:   1.70! (5/21/2019)   Weekly max warfarin dose:      Target end date:      INR check location:      Preferred lab:      Send INR reminders to:   [facility]    Indications    Persistent Atrial Fibrillation [I48.91]           Comments:   home monitor talk to wife about dose Neela   [phone] cabin   last order 1/8/19           Anticoagulation Care Providers     Provider Role Specialty Phone number    Deejay Lizama MD Referring Cardiology [phone]    Erica Hansen, CNP  Cardiology [phone]

## 2021-05-29 NOTE — TELEPHONE ENCOUNTER
Received a faxed INR result for Buck Sinclair  From [employer]  INR result dated 5/28/2019 is 2.1

## 2021-05-29 NOTE — TELEPHONE ENCOUNTER
Received a faxed INR result for Buck Sinclair  From [employer] [facility]    INR result dated 6/4/2019 is 2.6

## 2021-05-29 NOTE — TELEPHONE ENCOUNTER
Incoming fax from [employer] [facility] home monitoring     INR date: 5/21    See attached document

## 2021-05-29 NOTE — TELEPHONE ENCOUNTER
ANTICOAGULATION  MANAGEMENT    Assessment     Today's INR result of 2.1 is Therapeutic (goal INR of 2.0-3.0)        Warfarin taken as previously instructed    No new diet changes affecting INR    No new medication/supplements affecting INR    Continues to tolerate warfarin with no reported s/s of bleeding or thromboembolism     Previous INR was Subtherapeutic    Plan:     Spoke with Neela,patient's spouse regarding INR result and instructed:     Warfarin Dosing Instructions:  Continue current warfarin dose    5 mg every Mon, Wed, Fri; 2.5 mg all other days        (0 % change)    Instructed patient to follow up no later than: weekly with home monitor.    Discussed with patient that per in home monitor protocol:  [facility] will not call patient with weekly therapeutic INR's  No call means patient is to continue with current Warfarin dose and continue checking INR with home monitor every 1 -2 week per home monitor.  Also updated that ACN will call when INR is out of range and every 12 weeks for check in if INR's are in therapeutic range    Education provided: importance of therapeutic range and target INR goal and significance of current INR result    Neela verbalizes understanding and agrees to warfarin dosing plan.    Instructed to call the [facility] for any changes, questions or concerns. (#[phone])   ?   Gisele Prince RN    Subjective/Objective:      Buck Sinclair, a 74 y.o. male is on warfarin.     Buck reports:     Home warfarin dose: verbally confirmed home dose with Neela and updated on anticoagulation calendar     Missed doses: No     Medication changes:  No     S/S of bleeding or thromboembolism:  No     New Injury or illness:  No     Changes in diet or alcohol consumption:  No     Upcoming surgery, procedure or cardioversion:  No    Anticoagulation Episode Summary     Current INR goal:   2.0-3.0   TTR:   68.0 % (4.4 y)   Next INR check:   6/4/2019   INR from last check:   2.10 (5/28/2019)   Weekly  max warfarin dose:      Target end date:      INR check location:      Preferred lab:      Send INR reminders to:   [facility]ART [facility]    Indications    Persistent Atrial Fibrillation [I48.91]           Comments:   home monitor talk to wife about dose Neela   [phone] [place]   last order 1/8/19           Anticoagulation Care Providers     Provider Role Specialty Phone number    Erica Hansen CNP  Cardiology [phone]    Everett Renee MD  Cardiology [phone]

## 2021-05-29 NOTE — TELEPHONE ENCOUNTER
ANTICOAGULATION  MANAGEMENT-Patient Home Monitoring Result    Assessment     Therapeutic INR result of 2.1 (goal INR of 2.0-3.0) received via fax from [employer] home INR monitoring company.        Previous INR was Therapeutic    Buck Sinclair last contacted by phone: 5/28/19    Plan     Per home monitoring agreement with patient, patient was NOT contacted regarding therapeutic result today.  Patient is to continue current dose and continue to check INR with home monitor per protocol.  ?   Gisele Prince RN    Subjective/Objective:      Buck Sinclair, a 74 y.o. male  is established on warfarin using a home INR monitor.    Anticoagulation Episode Summary     Current INR goal:   2.0-3.0   TTR:   68.1 % (4.4 y)   Next INR check:   6/11/2019   INR from last check:   2.60 (6/4/2019)   Weekly max warfarin dose:      Target end date:      INR check location:      Preferred lab:      Send INR reminders to:   [facility]    Indications    Persistent Atrial Fibrillation [I48.91]           Comments:   home monitor talk to wife about dose [facility]   [phone]            Anticoagulation Care Providers     Provider Role Specialty Phone number    Erica Hansen CNP  Cardiology [phone]    Everett Renee MD  Cardiology [phone]

## 2021-05-29 NOTE — TELEPHONE ENCOUNTER
Updated provider in anticoagulation episode from Dr Deejay Lizama to Dr Everett Renee.    Gisele Prince RN

## 2021-05-29 NOTE — TELEPHONE ENCOUNTER
ANTICOAGULATION  MANAGEMENT    Assessment     Today's INR result of 3.5 is Supratherapeutic (goal INR of 2.0-3.0)        Warfarin taken as previously instructed    Change in alcohol intake may be affecting INR - had alcoholic drink yesterday.    Diet is different while they are at the cabin may be affecting INR     No new medication/supplements affecting INR    Continues to tolerate warfarin with no reported s/s of bleeding or thromboembolism     Previous INR was Therapeutic    Plan:     Spoke with Neela regarding INR result and instructed:     Warfarin Dosing Instructions:  take scheduled 2.5 mg today then take one time lower dose of 2.5 mg tomorrow then continue current warfarin dose    5 mg every Mon, Wed, Fri; 2.5 mg all other days     (0 % change)  Neela will have the patient eat greens today to help bring INR down.    Instructed patient to follow up no later than: one week with home monitor - call Neela at  ( they will be at the cabin )    Education provided: importance of consistent vitamin K intake, impact of vitamin K foods on INR, vitamin K content of foods, potential interaction between warfarin and alcohol, importance of therapeutic range, target INR goal and significance of current INR result and monitoring for bleeding signs and symptoms    Buck verbalizes understanding and agrees to warfarin dosing plan.    Instructed to call the [facility] for any changes, questions or concerns. (#[phone])   ?   Gisele Prince RN    Subjective/Objective:      Buck Sinclair, a 74 y.o. male is on warfarin.     Neela reports:     Home warfarin dose: verbally confirmed home dose with Neela and updated on anticoagulation calendar     Missed doses: No     Medication changes:  No     S/S of bleeding or thromboembolism:  No     New Injury or illness:  No     Changes in diet or alcohol consumption:  Yes: had alcoholic drink yesterday and diet different while they are at their cabin - Neela stated  that she just bought greens/vit k foods and that she will have the patient have some today to help bring INR down     Upcoming surgery, procedure or cardioversion:  No    Anticoagulation Episode Summary     Current INR goal:   2.0-3.0   TTR:   68.2 % (4.5 y)   Next INR check:   6/25/2019   INR from last check:   3.50! (6/18/2019)   Weekly max warfarin dose:      Target end date:      INR check location:      Preferred lab:      Send INR reminders to:   [facility]ART [facility]    Indications    Persistent Atrial Fibrillation [I48.91]           Comments:   home monitor talk to wife about dose [facility]   [phone]            Anticoagulation Care Providers     Provider Role Specialty Phone number    Erica Hansen CNP  Cardiology [phone]    Everett Renee MD  Cardiology [phone]

## 2021-05-29 NOTE — TELEPHONE ENCOUNTER
ANTICOAGULATION  MANAGEMENT-Patient Home Monitoring Result    Assessment     Therapeutic INR result of 2.6 (goal INR of 2.0-3.0) received via fax from [email or web address] home INR monitoring company.        Previous INR was Therapeutic    Buck Sinclair last contacted by phone: 5/28/19.    Plan     Per home monitoring agreement with patient, patient was NOT contacted regarding therapeutic result today.  Patient is to continue current dose and continue to check INR with home monitor per protocol.  ?   Gisele Prince RN    Subjective/Objective:      Buck Sinclair, a 74 y.o. male  is established on warfarin using a home INR monitor.    Anticoagulation Episode Summary     Current INR goal:   2.0-3.0   TTR:   68.1 % (4.4 y)   Next INR check:   6/11/2019   INR from last check:   2.60 (6/4/2019)   Weekly max warfarin dose:      Target end date:      INR check location:      Preferred lab:      Send INR reminders to:   [facility]ART [facility]    Indications    Persistent Atrial Fibrillation [I48.91]           Comments:   home monitor talk to wife about dose Neela   [phone] cabin   last order 1/8/19           Anticoagulation Care Providers     Provider Role Specialty Phone number    Erica Hansen CNP  Cardiology [phone]    Everett Renee MD  Cardiology [phone]

## 2021-05-30 ENCOUNTER — RECORDS - HEALTHEAST (OUTPATIENT)
Dept: ADMINISTRATIVE | Facility: CLINIC | Age: 76
End: 2021-05-30

## 2021-05-30 VITALS — WEIGHT: 270 LBS | BODY MASS INDEX: 33.57 KG/M2 | HEIGHT: 75 IN

## 2021-05-30 VITALS — BODY MASS INDEX: 34.69 KG/M2 | WEIGHT: 279 LBS | HEIGHT: 75 IN

## 2021-05-30 VITALS — WEIGHT: 263 LBS | HEIGHT: 75 IN | BODY MASS INDEX: 32.7 KG/M2

## 2021-05-30 VITALS — BODY MASS INDEX: 35.29 KG/M2 | HEIGHT: 75 IN | WEIGHT: 283.8 LBS

## 2021-05-30 VITALS — WEIGHT: 279 LBS | HEIGHT: 76 IN | BODY MASS INDEX: 33.97 KG/M2

## 2021-05-30 NOTE — TELEPHONE ENCOUNTER
Received a faxed INR result for Buck Sinclair  From [employer] [facility]    INR result dated 7/23/2019 is 2.4

## 2021-05-30 NOTE — TELEPHONE ENCOUNTER
ANTICOAGULATION  MANAGEMENT    Assessment     Today's INR result of 2.3 is Therapeutic (goal INR of 2.0-3.0)        Warfarin taken as previously instructed    Increased greens/vitamin K intake may be affecting INR    No new medication/supplements affecting INR    Continues to tolerate warfarin with no reported s/s of bleeding or thromboembolism     Previous INR was Supratherapeutic Neeal confirmed that the patient took the one time lower dose of 2.5 mg on 6/19 as instructed.    Plan:     Spoke with Neela, patient's spouse regarding INR result and instructed:     Warfarin Dosing Instructions:  Continue current warfarin dose    5 mg every Mon, Wed, Fri; 2.5 mg all other days      (0 % change)    Instructed patient to follow up no later than: one week with Home Monitor  Made aware that [facility] will not call next week if INR is therapeutic per protocol.    Neela is requesting to call her at  until after July 4th if needed.      Education provided: importance of therapeutic range and target INR goal and significance of current INR result    Neela verbalizes understanding and agrees to warfarin dosing plan.    Instructed to call the [facility] for any changes, questions or concerns. (#[phone])   ?   Gisele Prince RN    Subjective/Objective:      Buck Sinclair, a 74 y.o. male is on warfarin.     Neela Jane reports:     Home warfarin dose: verbally confirmed home dose with Neela and updated on anticoagulation calendar     Missed doses: No     Medication changes:  No     S/S of bleeding or thromboembolism:  No     New Injury or illness:  No     Changes in diet or alcohol consumption:  Yes: increased greens/vit k intake.     Upcoming surgery, procedure or cardioversion:  No    Anticoagulation Episode Summary     Current INR goal:   2.0-3.0   TTR:   68.2 % (4.5 y)   Next INR check:   7/2/2019   INR from last check:   2.30 (6/25/2019)   Weekly max warfarin dose:      Target end date:      INR check  location:      Preferred lab:      Send INR reminders to:   [facility] HEART [facility]    Indications    Persistent Atrial Fibrillation [I48.91]           Comments:   home monitor talk to wife about dose Neela   [phone]            Anticoagulation Care Providers     Provider Role Specialty Phone number    Erica Hansen CNP  Cardiology [phone]    Everett Renee MD  Cardiology [phone]

## 2021-05-30 NOTE — TELEPHONE ENCOUNTER
Received a faxed INR result for Buck Sinclair  From [employer] [facility]    INR result dated 7/9/2019 is 2.8

## 2021-05-30 NOTE — PROGRESS NOTES
Pulmonary Clinic Follow-up Visit    Assessment/Plan:  72 year old male with a history of tobacco dependence in remission, CAD s/p PCI/stents, afib s/p PVL with ICD/PPM on anticoagulation, history of cavitary lesion and extensive pneumonia in LLL in Oct 2017, subsequent recurrent LLL pneumonia, since resolved, presenting for follow up. He appears to be stable from a respiratory standpoint without any further hemoptysis. Dyspnea on exertion is stable. His CT scan showed decreasing size of the LLL opacity consistent with resolving infectious process and scarring in that region.    Plan:  #COPD, GOLD class B (CAT >10, few exacerbations/low risk for hospitalization). Minimal smoking history so this is probably due to his work as a   - continue budesonide-formoterol 160-4.5 mcg two inhalations BID with spacer; rinse/gargle/spit water after use. No changes today but will consider tapering at next visit.   - continue tiotropium HandiHaler one inhalation daily  - Cont albuterol as needed  - encouraged exercise, weight loss as able  - offered supplemental O2 for exertional hypoxemia seen on 6MWT today. He declined. Will let me know if he changes his mind  - can consider another round of pulmonary rehab if he develops more symptoms. No indication for now.  - UTD w/ pneumonia and flu vaccines. Rec flu shot every Fall.    #LLL nodule: decreasing in size on 3 month f/u scan after abx. 9mm -> 7mm and associated with scarring  - no further imaging needed unless recurrent symptoms    #Hemoptysis: no further recurrence. Likely was due to the LLL pneumonia in the setting of anticoagulation.  - continue INR goal close to 2, as discussed with Dr. Renee   - he'll let me know if any recurrence.    Follow up in 6 months.    CCx: follow up of hemoptysis, and COPD GOLD class B    HPI: Interim history: I last saw Buck on 4/11/2019. Since that time, he reports he's doing about the same. Still has occasional cough.  No further  hemotpysis since I last saw him. Feels the antibiotic we gave him last visit was somewhat helpful.  Complted pulm rehab in the past, didn't find it particularly helpful.  Gets winded with some activities.  Doing a lot of work at his daughter's cabin, trying to get it ready for sale. Does yard work.  INR >3 last check.       ROS:  A 12-system review was obtained and was negative with the exception of the symptoms endorsed in the history of present illness.    PMH:  Past Medical History:   Diagnosis Date     Anemia      BPH (benign prostatic hyperplasia)      COPD, group B, by GOLD 2017 classification (H)      Coronary artery disease due to calcified coronary lesion      Dyslipidemia, goal LDL below 70      Essential hypertension      History of cardiomyopathy      History of transfusion      Persistent atrial fibrillation (H)      Pneumonia of left lower lobe due to infectious organism (H) 10/4/2017     Skin cancer of trunk      Status post catheter ablation of atrial fibrillation 6/7/2017    PVI 4-2011 (Cryo/PVI + roof line + CTI line) Re-do PVI 7-2011 (RFA/PVI + CFE + VIDYA + confirmed CTI line)     Ventricular tachycardia (H)        PSH:  Past Surgical History:   Procedure Laterality Date     CARDIAC DEFIBRILLATOR PLACEMENT       CARDIOVERSION  07/11/2018    x20, last 2/12/15, 10/2015, 11/18/16, 6/16/17 by Lauren Foster CNP     CARDIOVERSION  07/11/2018     COLONOSCOPY N/A 4/28/2017    Procedure: COLONOSCOPY with 2 ascending polyps and 1 transverse polyp;  Surgeon: Jose Whittington MD;  Location: [facility] GI;  Service:      CV CORONARY ANGIOGRAM N/A 9/20/2017    Procedure: Coronary Angiogram;  Surgeon: Sergio Cervantes MD;  Location: [facility] Cath Lab;  Service:      EP ICD INSERT       FRACTURE SURGERY Left     wrist     INGUINAL HERNIA REPAIR Left 1967    while in the [employer] in [place] after 13 month in [place]     INSERT / REPLACE / REMOVE PACEMAKER       left hand surgery---tendon repair       MA ABLATE HEART  DYSRHYTHM FOCUS  2011    Catheter Ablation Atrial Fibrillation PVI 2011 (Cryo+RF-PVI + roof line + CTI line)     MD ABLATE HEART DYSRHYTHM FOCUS  2011    Re-do PVI 2011 (RFA-PVI + CFE + VIDYA + confirmation of CTI line)     MD MYERS W/O FACETEC FORAMOT/DSKC  VRT SEG, CERVICAL      Laminectomy Lumbar;  Recorded: 2012;     TOTAL SHOULDER REPLACEMENT Right 2016    Dr. Abernathy of [facility]       Allergies:  Allergies   Allergen Reactions     Adhesive Other (See Comments)     ADHESIVE TAPE; SKIN IRRITATION  Foam tape:  Skin removed with tape removal     Amiodarone      ADVERSE REACTION.  Sunlight sensitivity.     Lisinopril Cough       Family HX:  Family History   Problem Relation Age of Onset     Cancer Mother         leukemia     Cancer Father         bladder     Cancer Sister         breast with lung met.     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     Comment: [employer]   Social Needs     Financial resource strain: Not on file     Food insecurity:     Worry: Not on file     Inability: Not on file     Transportation needs:     Medical: Not on file     Non-medical: Not on file   Tobacco Use     Smoking status: Former Smoker     Packs/day: 1.00     Years: 4.00     Pack years: 4.00     Types: Cigarettes     Last attempt to quit: 1968     Years since quittin.5     Smokeless tobacco: Never Used   Substance and Sexual Activity     Alcohol use: Yes     Alcohol/week: 1.2 oz     Types: 2 Cans of beer per week     Comment: 1 beer per week     Drug use: No     Sexual activity: Yes     Partners:  Female     Birth control/protection: Post-menopausal   Lifestyle     Physical activity:     Days per week: Not on file     Minutes per session: Not on file     Stress: Not on file   Relationships     Social connections:     Talks on phone: Not on file     Gets together: Not on file     Attends [employer] service: Not on file     Active member of club or organization: Not on file     Attends meetings of clubs or organizations: Not on file     Relationship status: Not on file     Intimate partner violence:     Fear of current or ex partner: Not on file     Emotionally abused: Not on file     Physically abused: Not on file     Forced sexual activity: Not on file   Other Topics Concern     Not on file   Social History Narrative     Not on file       Current Meds:  Current Outpatient Medications   Medication Sig Dispense Refill     ACETAMINOPHEN (TYLENOL ORAL) Take 1,000 mg by mouth 2 (two) times a day.        amoxicillin (AMOXIL) 500 MG tablet Take prior to the Dentist       ascorbic acid (VITAMIN C) 1000 MG tablet Take 1,000 mg by mouth daily.       atorvastatin (LIPITOR) 40 MG tablet Take 40 mg by mouth at bedtime.       budesonide-formoterol (SYMBICORT) 160-4.5 mcg/actuation inhaler Inhale 2 puffs 2 (two) times a day.       cephalexin (KEFLEX) 500 MG capsule Take 1 capsule by mouth 3 (three) times a day.  0     co-enzyme Q-10 30 mg capsule Take 100 mg by mouth daily.       furosemide (LASIX) 20 MG tablet TAKE 1 TABLET(20 MG) BY MOUTH DAILY 90 tablet 1     losartan (COZAAR) 50 MG tablet TAKE 1 TABLET BY MOUTH EVERY DAY 90 tablet 2     MAG 64 64 mg TbEC delayed-release tablet TAKE 1 TABLET BY MOUTH TWICE DAILY 180 tablet 1     multivitamin (MULTIVITAMIN) per tablet Take 1 tablet by mouth daily.       omega 3-dha-epa-fish oil (FISH OIL) 1,000 mg (120 mg-180 mg) cap Take 2 tablets by mouth 2 (two) times a day.        polyethylene glycol (MIRALAX) 17 gram packet Take 17 g by mouth daily.       sotalol (BETAPACE) 80 MG  "tablet TAKE 2 TABLETS(160 MG) BY MOUTH TWICE DAILY 360 tablet 1     tiotropium (SPIRIVA) 18 mcg inhalation capsule Place 18 mcg into inhaler and inhale daily.       VITAMIN D2 50,000 unit capsule Take 50,000 Units by mouth 2 (two) times a week. SUN and WED  3     warfarin (COUMADIN/JANTOVEN) 2.5 MG tablet TAKE 5 MG TABLETS BY MOUTH M W F AND 2.5 MG TABLETS ALL OTHER DAYS. 90 tablet 0     warfarin (COUMADIN/JANTOVEN) 5 MG tablet Take as directed  0     albuterol (PROAIR HFA;PROVENTIL HFA;VENTOLIN HFA) 90 mcg/actuation inhaler Inhale 2 puffs every 6 (six) hours as needed for wheezing. 1 Inhaler 11     aspirin 81 MG EC tablet Take 1 tablet (81 mg total) by mouth daily. 30 tablet 11     furosemide (LASIX) 20 MG tablet TAKE 1 TABLET(20 MG) BY MOUTH DAILY 90 tablet 3     vardenafil (LEVITRA) 10 MG tablet Take 10 mg by mouth daily as needed for erectile dysfunction.       No current facility-administered medications for this visit.        Physical Exam:  /60   Pulse 62   Resp 16   Ht 6' 3\" (1.905 m)   Wt (!) 276 lb 8 oz (125.4 kg)   SpO2 96%   BMI 34.56 kg/m    Gen: alert, oriented, no distress  HEENT: nasal turbinates are unremarkable, no oropharyngeal lesions, no cervical or supraclavicular lymphadenopathy  CV: RRR, no M/G/R  Resp: CTAB, no focal crackles or wheezes  Abd: soft, nontender, no palpable organomegaly  Skin: no apparent rashes  Ext: no cyanosis, clubbing or edema  Neuro: alert, nonfocal    Labs:  Reviewed  Dec 2018  Chem panel wnl  TSH wnl  hgb 12.1 Oct 2018    Previous testing  DONNA neg  blasto neg  Histo testing neg  c-ANCA neg  HIV neg  RF neg    Bronch/LLL BAL cultures neg      Imaging studies:  CT chest April 2018  IMPRESSION:   CONCLUSION:  1.  Mild scarring and slight bronchiectasis present at both lung bases. No active pneumonia identified.    CT chest 7/15/2019  IMPRESSION:   CONCLUSION:   1.  Nodular opacity of the left lower lobe of interest on 04/17/2019 is less conspicuous today and " probably explained by scarring. Additional 6 month chest CT follow-up is recommended.  2.  Emphysema and scattered areas of scarring elsewhere in both lungs.  3.  Advanced multivessel coronary artery disease.    PFTs 2018  FEV1/FVC is 0.56 and is reduced.  FEV1 is 73% predicted and is reduced.  FVC is 96% predicted and normal.  There was no improvement in spirometry after a single inhaled dose of bronchodilator.  TLC is 99% predicted and is normal.  RV is 113% predicted and is normal.  DLCO is 93% predicted and is normal when it   is corrected for hemoglobin.     Impression:  Full Pulmonary Function Test is abnormal.  PFTs are consistent with mild obstructive disease.  Spirometry is not consistent with reversibility.  There is no hyperinflation.  There is no air-trapping.  Diffusion capacity when corrected for hemoglobin is normal.  Declines in both FEV1 and TLC since 2009 with overall preservation of diffusing capacity.    Hugh Payton MD (Avi)  [facility] Pulmonary & Critical Care  Pager [phone]  Clinic [phone]

## 2021-05-30 NOTE — TELEPHONE ENCOUNTER
ANTICOAGULATION  MANAGEMENT-Patient Home Monitoring Result    Assessment     Therapeutic INR result of 2.2 (goal INR of 2.0-3.0) received via fax from [employer] home INR monitoring company.        Previous INR was Therapeutic    Buck Sinclair last contacted by phone: 7/23/19    Plan     Per home monitoring agreement with patient, patient was NOT contacted regarding therapeutic result today.  Patient is to continue current dose and continue to check INR with home monitor per protocol.  ?   Fatoumata Ochoa RN    Subjective/Objective:      Buck Sinclair, a 74 y.o. male  is established on warfarin using a home INR monitor.    Anticoagulation Episode Summary     Current INR goal:   2.0-3.0   TTR:   68.4 % (4.6 y)   Next INR check:   8/6/2019   INR from last check:   2.20 (7/30/2019)   Weekly max warfarin dose:      Target end date:      INR check location:      Preferred lab:      Send INR reminders to:   [facility]ART [facility]    Indications    Persistent Atrial Fibrillation [I48.91]           Comments:   home monitor talk to wife about dose [facility]   [phone]            Anticoagulation Care Providers     Provider Role Specialty Phone number    Erica Hansen CNP  Cardiology [phone]    Everett Renee MD  Cardiology [phone]

## 2021-05-30 NOTE — TELEPHONE ENCOUNTER
ANTICOAGULATION  MANAGEMENT    Assessment     Today's INR result of 2.4 is Therapeutic (goal INR of 2.0-3.0)        Warfarin taken as previously instructed    No new diet changes affecting INR    No new medication/supplements affecting INR    Continues to tolerate warfarin with no reported s/s of bleeding or thromboembolism     Previous INR was Supratherapeutic    Plan:     Spoke with Neela, patient's spouse regarding INR result and instructed:     Warfarin Dosing Instructions:  Change warfarin dose to    5 mg every Mon, Fri; 2.5 mg all other days      (0 % change) this is the same amount the patient took this past week - Neela would like to keep the patient's dose at this time.    Instructed patient to follow up no later than: one week with home monitor.    Education provided: importance of consistent vitamin K intake, potential interaction between warfarin and alcohol, importance of therapeutic range and target INR goal and significance of current INR result    Neela verbalizes understanding and agrees to warfarin dosing plan.    Instructed to call the [facility] for any changes, questions or concerns. (#[phone])   ?   Per [facility] Protocol/ Gisele Prince RN ,ACN / Erica Hansen        Subjective/Objective:      Buck JONES Sinclair, a 74 y.o. male is on warfarin.     Buck reports:     Home warfarin dose: as updated on anticoagulation calendar per template     Missed doses: No     Medication changes:  No     S/S of bleeding or thromboembolism:  No     New Injury or illness:  No     Changes in diet or alcohol consumption:  No     Upcoming surgery, procedure or cardioversion:  No    Anticoagulation Episode Summary     Current INR goal:   2.0-3.0   TTR:   68.3 % (4.5 y)   Next INR check:   7/30/2019   INR from last check:   2.40 (7/23/2019)   Weekly max warfarin dose:      Target end date:      INR check location:      Preferred lab:      Send INR reminders to:   [facility]ART [facility]    Indications     Persistent Atrial Fibrillation [I48.91]           Comments:   home monitor talk to wife about dose Neela   [phone]            Anticoagulation Care Providers     Provider Role Specialty Phone number    Erica Hansen, CNP  Cardiology [phone]    Everett Renee MD  Cardiology [phone]

## 2021-05-30 NOTE — TELEPHONE ENCOUNTER
Lab Results   Component Value Date    INR 2.40 (!) 07/23/2019    INR 3.30 (!) 07/16/2019    INR 2.90 (!) 07/09/2019       Patient's current Warfarin doses:    5 mg every Mon, Fri; 2.5 mg all other days           Next INR check is on 7/30/19      Patient's last OV with HCC was on 3/11/19.  ACN called and spoke with patient's spouse and discussed that moving forward that warfarin 2.5 mg tablet will be sent to pharmacy and will be used to patient's dosing moving forward.    Warfarin prescription 3 month supply and one refill sent to patient's pharmacy today.ACN also called pharmacy for update.    Gisele Prince RN

## 2021-05-30 NOTE — TELEPHONE ENCOUNTER
ANTICOAGULATION  MANAGEMENT    Assessment     Today's INR result of 3.3 is Supratherapeutic (goal INR of 2.0-3.0)        Warfarin taken as previously instructed    Decreased greens/vitamin K and alcohol  intake may be affecting INR    No new medication/supplements affecting INR    Continues to tolerate warfarin with no reported s/s of bleeding or thromboembolism     Previous INR was Therapeutic    Plan:     Spoke with patient's spouse Neela regarding INR result and instructed:     Warfarin Dosing Instructions:  take scheduled 2.5 mg today then one time lower dose of 2.5 mg tomorrow then continue current warfarin dose    5 mg every Mon, Wed, Fri; 2.5 mg all other days      (0 % change)    Instructed patient to follow up no later than: one week with home monitor.    Education provided: importance of consistent vitamin K intake, importance of therapeutic range and target INR goal and significance of current INR result    Neela verbalizes understanding and agrees to warfarin dosing plan.    Instructed to call the [facility] for any changes, questions or concerns. (#[phone])   ?   Per [facility] Protocol/ Gisele Prince RN ,ACN / Erica Hansen CNP        Subjective/Objective:      Buckbradley Sinclair, a 74 y.o. male is on warfarin.     Neela Jane reports:     Home warfarin dose: verbally confirmed home dose with Neela and updated on anticoagulation calendar     Missed doses: No     Medication changes:  No     S/S of bleeding or thromboembolism:  No     New Injury or illness:  No     Changes in diet or alcohol consumption:  Yes: less green and less alcohol intake     Upcoming surgery, procedure or cardioversion:  No    Anticoagulation Episode Summary     Current INR goal:   2.0-3.0   TTR:   68.3 % (4.5 y)   Next INR check:   7/23/2019   INR from last check:   3.30! (7/16/2019)   Weekly max warfarin dose:      Target end date:      INR check location:      Preferred lab:      Send INR reminders to:   [facility]    Indications    Persistent Atrial Fibrillation [I48.91]           Comments:   home monitor talk to wife about dose Neela   [phone]            Anticoagulation Care Providers     Provider Role Specialty Phone number    Erica Hansen CNP  Cardiology [phone]    Everett Renee MD  Cardiology [phone]

## 2021-05-30 NOTE — TELEPHONE ENCOUNTER
ANTICOAGULATION  MANAGEMENT-Patient Home Monitoring Result    Assessment     Therapeutic INR result of 2.9 (goal INR of 2.0-3.0) received via fax from [email or web address]   home INR monitoring company.        Previous INR was Therapeutic    Buck Sinclair last contacted by phone: 6/25/19    Plan     Per home monitoring agreement with patient, patient was NOT contacted regarding therapeutic result today.  Patient is to continue current dose and continue to check INR with home monitor per protocol.  ?   Per [facility] Protocol/ Gisele Prince RN ,ACN / Erica Hansen CNP      Subjective/Objective:      Buck Sinclair, a 74 y.o. male  is established on warfarin using a home INR monitor.    Anticoagulation Episode Summary     Current INR goal:   2.0-3.0   TTR:   68.5 % (4.5 y)   Next INR check:   7/16/2019   INR from last check:   2.90 (7/9/2019)   Weekly max warfarin dose:      Target end date:      INR check location:      Preferred lab:      Send INR reminders to:   [facility]    Indications    Persistent Atrial Fibrillation [I48.91]           Comments:   home monitor talk to wife about dose [facility]   [phone]            Anticoagulation Care Providers     Provider Role Specialty Phone number    Erica Hansen CNP  Cardiology [phone]    Everett Renee MD  Cardiology [phone]

## 2021-05-30 NOTE — TELEPHONE ENCOUNTER
INR result is 2.4  INR   Date Value Ref Range Status   07/23/2019 2.40 (!) 0.9 - 1.1 Final       Will the patient be seen, or did they already see, MD or CNP today? No    Most Recent Warfarin dose day/week  Sunday Monday Tuesday Wednesday Thursday Friday Saturday      2.5 2.5 5 2.5     Sunday Monday Tuesday Wednesday Thursday Friday Saturday   2.5 5            Has the patient missed any doses of Coumadin, Warfarin, Jantoven in the past 7 days? No    Has the patients medications changed since the last visit? No    Has the patient experienced any bleeding recently? No    Has the patient experienced any injuries or illness recently? No    Has the patient experienced any 'new' shortness of breath, severe headaches, or changes in vision recently? No    Has the patient had any changes in their diet, or alcohol consumption? No    Is the patient here today to prepare for any type of upcoming surgery, procedure, or for a cardioversion procedure? No    What phone number can we reach the patient at today? home phone listed in demographics.

## 2021-05-30 NOTE — TELEPHONE ENCOUNTER
Received a faxed INR result for Buck Sinclair  From [facility]    INR result dated 7/16/2019 is 3.3

## 2021-05-30 NOTE — TELEPHONE ENCOUNTER
ANTICOAGULATION  MANAGEMENT-Patient Home Monitoring Result    Assessment     Therapeutic INR result of 2.3 (goal INR of 2.0-3.0) received via fax from [employer] home INR monitoring company.        Previous INR was Therapeutic    Buck Sinclair last contacted by phone: 6/25/19    Plan     Per home monitoring agreement with patient, patient was NOT contacted regarding therapeutic result today.  Patient is to continue current dose and continue to check INR with home monitor per protocol.  ?   Suzanna Corea RN    Subjective/Objective:      Buck Sinclair, a 74 y.o. male  is established on warfarin using a home INR monitor.    Anticoagulation Episode Summary     Current INR goal:   2.0-3.0   TTR:   68.3 % (4.5 y)   Next INR check:   7/9/2019   INR from last check:   2.30 (7/2/2019)   Weekly max warfarin dose:      Target end date:      INR check location:      Preferred lab:      Send INR reminders to:   [facility] HEART [facility]    Indications    Persistent Atrial Fibrillation [I48.91]           Comments:   home monitor talk to wife about dose [facility]   [phone]            Anticoagulation Care Providers     Provider Role Specialty Phone number    Erica Hansen CNP  Cardiology [phone]    Everett Renee MD  Cardiology [phone]

## 2021-05-31 VITALS — BODY MASS INDEX: 33.75 KG/M2 | WEIGHT: 270 LBS

## 2021-05-31 VITALS — WEIGHT: 275 LBS | HEIGHT: 75 IN | BODY MASS INDEX: 34.19 KG/M2

## 2021-05-31 VITALS — BODY MASS INDEX: 35.12 KG/M2 | WEIGHT: 281 LBS

## 2021-05-31 VITALS — WEIGHT: 270 LBS | BODY MASS INDEX: 33.75 KG/M2

## 2021-05-31 VITALS — WEIGHT: 280 LBS | BODY MASS INDEX: 35 KG/M2

## 2021-05-31 VITALS — WEIGHT: 276 LBS | BODY MASS INDEX: 34.5 KG/M2

## 2021-05-31 VITALS — BODY MASS INDEX: 34.37 KG/M2 | WEIGHT: 275 LBS

## 2021-05-31 VITALS — HEIGHT: 75 IN | WEIGHT: 280 LBS | BODY MASS INDEX: 34.82 KG/M2

## 2021-05-31 VITALS — WEIGHT: 275 LBS | BODY MASS INDEX: 34.19 KG/M2 | HEIGHT: 75 IN

## 2021-05-31 VITALS — BODY MASS INDEX: 33.36 KG/M2 | HEIGHT: 76 IN | WEIGHT: 274 LBS

## 2021-05-31 VITALS — WEIGHT: 268.9 LBS | BODY MASS INDEX: 33.43 KG/M2 | HEIGHT: 75 IN

## 2021-05-31 VITALS — BODY MASS INDEX: 34.12 KG/M2 | WEIGHT: 273 LBS

## 2021-05-31 VITALS — BODY MASS INDEX: 34.82 KG/M2 | HEIGHT: 75 IN | WEIGHT: 280 LBS

## 2021-05-31 VITALS — HEIGHT: 76 IN | BODY MASS INDEX: 34.22 KG/M2 | WEIGHT: 281 LBS

## 2021-05-31 VITALS — BODY MASS INDEX: 34.5 KG/M2 | WEIGHT: 276 LBS

## 2021-05-31 VITALS — BODY MASS INDEX: 35 KG/M2 | WEIGHT: 280 LBS

## 2021-05-31 VITALS — BODY MASS INDEX: 34.25 KG/M2 | WEIGHT: 274 LBS

## 2021-05-31 VITALS — WEIGHT: 282 LBS | BODY MASS INDEX: 35.25 KG/M2

## 2021-05-31 VITALS — BODY MASS INDEX: 34.75 KG/M2 | WEIGHT: 278 LBS

## 2021-05-31 VITALS — BODY MASS INDEX: 35.24 KG/M2 | WEIGHT: 281.9 LBS

## 2021-05-31 VITALS — BODY MASS INDEX: 33.12 KG/M2 | WEIGHT: 265 LBS

## 2021-05-31 VITALS — WEIGHT: 275 LBS | BODY MASS INDEX: 34.37 KG/M2

## 2021-05-31 VITALS — WEIGHT: 278 LBS | BODY MASS INDEX: 34.75 KG/M2

## 2021-05-31 VITALS — WEIGHT: 273 LBS | BODY MASS INDEX: 34.12 KG/M2

## 2021-05-31 NOTE — TELEPHONE ENCOUNTER
Incoming fax from [employer] [facility] home monitoring     INR date: 8/27    See attached document

## 2021-05-31 NOTE — TELEPHONE ENCOUNTER
ANTICOAGULATION  MANAGEMENT-Patient Home Monitoring Result    Assessment     Therapeutic INR result of 2.5 (goal INR of 2.0-3.0) received via fax from [employer] home INR monitoring company.        Previous INR was Therapeutic    Buck Sinclair last contacted by phone: 7/23/19    Plan     Per home monitoring agreement with patient, patient was NOT contacted regarding therapeutic result today.  Patient is to continue current dose and continue to check INR with home monitor per protocol.  ?   Per [facility] Protocol/ Gisele Prince RN ,ACN / Erica Hansen CNP      Subjective/Objective:      Buck Sinclair, a 74 y.o. male  is established on warfarin using a home INR monitor.    Anticoagulation Episode Summary     Current INR goal:   2.0-3.0   TTR:   68.5 % (4.6 y)   Next INR check:   8/13/2019   INR from last check:   2.50 (8/6/2019)   Weekly max warfarin dose:      Target end date:      INR check location:      Preferred lab:      Send INR reminders to:   [facility] HEART [facility]    Indications    Persistent Atrial Fibrillation [I48.91]           Comments:   home monitor talk to wife about dose [facility]   [phone]            Anticoagulation Care Providers     Provider Role Specialty Phone number    Erica Hansen CNP  Cardiology [phone]    Everett Renee MD  Cardiology [phone]

## 2021-05-31 NOTE — TELEPHONE ENCOUNTER
ANTICOAGULATION  MANAGEMENT-Patient Home Monitoring Result    Assessment     Therapeutic INR result of 2.0 (goal INR of 2.0-3.0) received via fax from [employer] home INR monitoring company.        Previous INR was Therapeutic    Buck Sinclair last contacted by phone: 7/23/19    Plan     Per home monitoring agreement with patient, patient was NOT contacted regarding therapeutic result today.  Patient is to continue current dose and continue to check INR with home monitor per protocol.  ?   Gisele Prince RN    Subjective/Objective:      Buck Sinclair, a 74 y.o. male  is established on warfarin using a home INR monitor.    Anticoagulation Episode Summary     Current INR goal:   2.0-3.0   TTR:   68.9 % (4.6 y)   Next INR check:   9/3/2019   INR from last check:   2.00 (8/27/2019)   Weekly max warfarin dose:      Target end date:      INR check location:      Preferred lab:      Send INR reminders to:   [facility] HEART CARE    Indications    Persistent Atrial Fibrillation [I48.91]           Comments:   home monitor talk to wife about dose [facility]   [phone]            Anticoagulation Care Providers     Provider Role Specialty Phone number    Erica Hansen CNP  Cardiology [phone]    Everett Renee MD  Cardiology [phone]

## 2021-05-31 NOTE — TELEPHONE ENCOUNTER
Received a faxed INR result for Buck Sinclair  From [employer] [facility]    INR result dated 9/3/2019 is 2.0

## 2021-05-31 NOTE — TELEPHONE ENCOUNTER
ANTICOAGULATION  MANAGEMENT-Patient Home Monitoring Result    Assessment     Therapeutic INR result of 2.4 (goal INR of 2.0-3.0) received via fax from [employer] home INR monitoring company.        Previous INR was Therapeutic    Buck Sinclair last contacted by phone: 7/23/19    Plan     Per home monitoring agreement with patient, patient was NOT contacted regarding therapeutic result today.  Patient is to continue current dose and continue to check INR with home monitor per protocol.      Per ACM Protocol/ Gisele Prince RN ,ACN / Yohannes Hansen CNP      Subjective/Objective:      Buck Sinclair, a 74 y.o. male  is established on warfarin using a home INR monitor.    Anticoagulation Episode Summary     Current INR goal:   2.0-3.0   TTR:   68.8 % (4.6 y)   Next INR check:   8/27/2019   INR from last check:   2.40 (8/20/2019)   Weekly max warfarin dose:      Target end date:      INR check location:      Preferred lab:      Send INR reminders to:   [facility] HEART [facility]    Indications    Persistent Atrial Fibrillation [I48.91]           Comments:   home monitor [facility]   [phone]            Anticoagulation Care Providers     Provider Role Specialty Phone number    Erica Hansen CNP  Cardiology [phone]    Everett Renee MD  Cardiology [phone]

## 2021-05-31 NOTE — TELEPHONE ENCOUNTER
Incoming fax from [employer] [facility] home monitoring     INR date: 8/6    See attached document

## 2021-05-31 NOTE — TELEPHONE ENCOUNTER
ANTICOAGULATION  MANAGEMENT-Patient Home Monitoring Result    Assessment     Therapeutic INR result of 2.0 (goal INR of 2.0-3.0) received via fax from [employer] home INR monitoring company.        Previous INR was Therapeutic    Buck Sinclair last contacted by phone: 7/23/19    Plan     Per home monitoring agreement with patient, patient was NOT contacted regarding therapeutic result today.  Patient is to continue current dose and continue to check INR with home monitor per protocol.    Per ACM Protocol/ Gisele Prince RN ,ACN / Erica Hansen CNP        Subjective/Objective:      Bcuk Sinclair, a 74 y.o. male  is established on warfarin using a home INR monitor.    Anticoagulation Episode Summary     Current INR goal:   2.0-3.0   TTR:   70.5 %   Next INR check:   9/10/2019   INR from last check:   2.00 (9/3/2019)   Weekly max warfarin dose:      Target end date:      INR check location:      Preferred lab:      Send INR reminders to:   [facility] HEART CARE    Indications    Persistent Atrial Fibrillation [I48.91]           Comments:   home monitor talk to wife about dose [facility]   [phone]            Anticoagulation Care Providers     Provider Role Specialty Phone number    Erica Hansen CNP  Cardiology [phone]    Everett Renee MD  Cardiology [phone]

## 2021-05-31 NOTE — TELEPHONE ENCOUNTER
"Type: [employer] remote ICD transmission done at [facility].  Presenting rhythm: atrial fibrillation with ventricular sensing, rate 70 bpm.  Battery/lead status: stable  Arrhythmias: since 6/11/19, four mode switches, EGMs confirm AF, longest in progress since 8/7/19, burden 11.5% (since counters cleared 3/11/19), V-rates >/=120 bpm <5%. No VT/VF detected.  Anticoagulant: warfarin  Comments: normal ICD function. prd  Device/lead alerts: none. prd     Patient called earlier this morning with reports that his device has been beeping every day at around 10:10 in the morning. His monitor has been disconnected, we tried to fix it this am but unsuccessful. I had him call [employer] and told him that if they cannot get monitor to work he needs to go to local ER to get interrogated.     [place] cell service which is why monitor has not picked up a connection. [employer] ended up sending him to local ER where a [employer] remote transmission was done. This remote (report above) showed that patient has been in AF since 8/7/19 and has been trying to send alert for AF. However, after so many attempts to connect with monitor are unsuccessful it will cause the ICD itself to start beeping which is what happened in this case.      Reviewed with Buck that his device function is good, but device is beeping due to no connection with home monitor while in AF. Buck states he has had several cardioversions in the past (26 total he thinks) and states \" I don't think Dr. Renee was going to do them anymore after the last one.\" He states he has intermittent LIMA but that this is not abnormal as his COPD has been flaring up quite a bit lately. Denies excessive weight gain or fluid retention, no palpitations or chest pain. Advised to seek local ER if symptoms should worsen since he will not be back to [facility] until after Labor Day.    Advised to call " [employer] back to see what options they can work out for home monitoring in future as he is up [place] quite often. He is to call us when he returns to set up Device RN appointment to reset device/alert tones with  in clinic. He also is due to see Dr. Renee next month but has not set this up yet and now may have to be pushed out until October. Patient verbalized understanding.     Will send message to schedulers and forward results to Dr. Renee for persistent A.fib.     Jaclyn Zaman RN

## 2021-05-31 NOTE — TELEPHONE ENCOUNTER
ANTICOAGULATION  MANAGEMENT-Patient Home Monitoring Result    Assessment     Therapeutic INR result of 2.5 (goal INR of 2.0-3.0) received via fax from [employer] home INR monitoring company.        Previous INR was Therapeutic    Buck Sinclair last contacted by phone: 7/23/19    Plan     Per home monitoring agreement with patient, patient was NOT contacted regarding therapeutic result today.  Patient is to continue current dose and continue to check INR with home monitor per protocol.  ?   Gisele Prince RN    Subjective/Objective:      Buck Sinclair, a 74 y.o. male  is established on warfarin using a home INR monitor.    Anticoagulation Episode Summary     Current INR goal:   2.0-3.0   TTR:   68.7 % (4.6 y)   Next INR check:   8/20/2019   INR from last check:   2.50 (8/13/2019)   Weekly max warfarin dose:      Target end date:      INR check location:      Preferred lab:      Send INR reminders to:   [facility] HEART CARE    Indications    Persistent Atrial Fibrillation [I48.91]           Comments:   home monitor talk to wife about dose [facility]   [phone]            Anticoagulation Care Providers     Provider Role Specialty Phone number    Erica Hansen CNP  Cardiology [phone]    Everett Renee MD  Cardiology [phone]

## 2021-05-31 NOTE — TELEPHONE ENCOUNTER
Incoming fax from [employer] [facility] home monitoring     INR date: 8/13    See attached document

## 2021-06-01 ENCOUNTER — AMBULATORY - HEALTHEAST (OUTPATIENT)
Dept: ANTICOAGULATION | Facility: CLINIC | Age: 76
End: 2021-06-01

## 2021-06-01 VITALS — WEIGHT: 282 LBS | HEIGHT: 74 IN | BODY MASS INDEX: 36.19 KG/M2

## 2021-06-01 VITALS — BODY MASS INDEX: 36.19 KG/M2 | WEIGHT: 282 LBS | HEIGHT: 74 IN

## 2021-06-01 VITALS — WEIGHT: 282.5 LBS | BODY MASS INDEX: 35.31 KG/M2

## 2021-06-01 VITALS — HEIGHT: 74 IN | BODY MASS INDEX: 35.81 KG/M2 | WEIGHT: 279 LBS

## 2021-06-01 VITALS — HEIGHT: 74 IN | WEIGHT: 281 LBS | BODY MASS INDEX: 36.06 KG/M2

## 2021-06-01 VITALS — BODY MASS INDEX: 33.74 KG/M2 | WEIGHT: 271.4 LBS | HEIGHT: 75 IN

## 2021-06-01 VITALS — WEIGHT: 280 LBS | HEIGHT: 75 IN | BODY MASS INDEX: 34.82 KG/M2

## 2021-06-01 VITALS — HEIGHT: 74 IN | BODY MASS INDEX: 36.19 KG/M2 | WEIGHT: 282 LBS

## 2021-06-01 LAB — INR PPP: 2.2 (ref 0.9–1.1)

## 2021-06-01 NOTE — TELEPHONE ENCOUNTER
Type: In-clinic ICD check   Presenting: AF-/VS, 56bpm  Lead/Battery Status: Stable lead and battery measurements.  Atrial Arrhythmias: Persistent AF since 8/7/2019. VR>/=120~10%  Vent Arrhythmias: None  Anticoagulant: Warfarin  Comments: Normal device function. Alert for AT/AF programmed off. DARYA

## 2021-06-01 NOTE — TELEPHONE ENCOUNTER
ANTICOAGULATION  MANAGEMENT-Patient Home Monitoring Result    Assessment     Therapeutic INR result of 2.2 (goal INR of 2.0-3.0) received via fax from [employer] home INR monitoring company.        Previous INR was Therapeutic    Buck Sinclair last contacted by phone: 7/23/19    Plan     Per home monitoring agreement with patient, patient was NOT contacted regarding therapeutic result today.  Patient is to continue current dose and continue to check INR with home monitor per protocol.  ?   Gisele Prince RN    Subjective/Objective:      Buck Sinclair, a 74 y.o. male  is established on warfarin using a home INR monitor.    Anticoagulation Episode Summary     Current INR goal:   2.0-3.0   TTR:   73.2 %   Next INR check:   10/15/2019   INR from last check:   2.20 (10/1/2019)   Weekly max warfarin dose:      Target end date:      INR check location:      Preferred lab:      Send INR reminders to:   [facility] HEART CARE    Indications    Persistent Atrial Fibrillation [I48.91]           Comments:   home monitor talk to wife about dose [facility]   [phone]            Anticoagulation Care Providers     Provider Role Specialty Phone number    Erica Hansen CNP  Cardiology [phone]    Everett Renee MD  Cardiology [phone]

## 2021-06-01 NOTE — TELEPHONE ENCOUNTER
Received a faxed INR result for Buck Sinclair  From [facility]  INR result dated 9/17/2019 is 2.2      Last INR on 9/10/19 was 2.2    [facility] - Dr. Lizama

## 2021-06-01 NOTE — TELEPHONE ENCOUNTER
ANTICOAGULATION  MANAGEMENT-Patient Home Monitoring Result    Assessment     Therapeutic INR result of 2.2 (goal INR of 2.0-3.0) received via fax from [employer] home INR monitoring company.        Previous INR was Therapeutic at 2.2 on 9/17/19.    Buck Sinclair last contacted by phone:  7/23/19.    Plan     Per home monitoring agreement with patient, patient was NOT contacted regarding therapeutic result today.  Patient is to continue current dose and continue to check INR with home monitor per protocol.  ?   Daysi Arciniega RN    Subjective/Objective:      Buck Sinclair, a 74 y.o. male  is established on warfarin using a home INR monitor.    Anticoagulation Episode Summary     Current INR goal:   2.0-3.0   TTR:   71.2 %   Next INR check:   10/1/2019   INR from last check:   2.20 (9/24/2019)   Weekly max warfarin dose:      Target end date:      INR check location:      Preferred lab:      Send INR reminders to:   [facility] HEART CARE    Indications    Persistent Atrial Fibrillation [I48.91]           Comments:   home monitor talk to wife about dose [facility]   [phone]            Anticoagulation Care Providers     Provider Role Specialty Phone number    Erica Hansen CNP  Cardiology [phone]    Everett Renee MD  Cardiology [phone]

## 2021-06-01 NOTE — TELEPHONE ENCOUNTER
Incoming fax from [employer] [facility] home monitoring     INR date: 10/1    See attached document

## 2021-06-01 NOTE — PROGRESS NOTES
Pt was called, corresponding information/recommendations reviewed, verbalized understanding, has no further questions at this time and contact information was given for further concerns/questions   Pt currently scheduled on 10/31 to see Dr Renee as previously arranged   9/10/2019 12:59 PM  Val Angeles RN

## 2021-06-01 NOTE — TELEPHONE ENCOUNTER
Incoming fax from [employer] [facility] home monitoring     INR date: 9/10    See attached document

## 2021-06-01 NOTE — TELEPHONE ENCOUNTER
Received PA for Sotalol. Last time there is a disablility insurance that pt goes through. Ruby Boyce [phone]. Left message for her to call back with instructions.

## 2021-06-01 NOTE — TELEPHONE ENCOUNTER
ANTICOAGULATION  MANAGEMENT-Patient Home Monitoring Result    Assessment     Therapeutic INR result of 2.2 (goal INR of 2.0-3.0) received via fax from [employer] home INR monitoring company.        Previous INR was Therapeutic    Buck Sinclair last contacted by phone: 7/23    Plan     Per home monitoring agreement with patient, patient was NOT contacted regarding therapeutic result today.  Patient is to continue current dose and continue to check INR with home monitor per protocol.  ?   Neri Mace RN    Subjective/Objective:      Buck Sinclair, a 74 y.o. male  is established on warfarin using a home INR monitor.    Anticoagulation Episode Summary     Current INR goal:   2.0-3.0   TTR:   70.5 %   Next INR check:   9/24/2019   INR from last check:   2.20 (9/17/2019)   Weekly max warfarin dose:      Target end date:      INR check location:      Preferred lab:      Send INR reminders to:   [facility] HEART CARE    Indications    Persistent Atrial Fibrillation [I48.91]           Comments:   home monitor talk to wife about dose [facility]   [phone]            Anticoagulation Care Providers     Provider Role Specialty Phone number    Erica Hansen CNP  Cardiology [phone]    Everett Renee MD  Cardiology [phone]

## 2021-06-01 NOTE — TELEPHONE ENCOUNTER
Incoming fax from [employer] [facility] home monitoring     INR date: 9/24    See attached document

## 2021-06-01 NOTE — TELEPHONE ENCOUNTER
ANTICOAGULATION  MANAGEMENT-Patient Home Monitoring Result    Assessment     Therapeutic INR result of 2.2 (goal INR of 2.0-3.0) received via fax from [employer] home INR monitoring company.        Previous INR was Therapeutic    Buck Sinclair last contacted by phone: 7/23/19    Plan     Per home monitoring agreement with patient, patient was NOT contacted regarding therapeutic result today.  Patient is to continue current dose and continue to check INR with home monitor per protocol.  ?   Gisele Prince RN    Subjective/Objective:      Buck Sinclair, a 74 y.o. male  is established on warfarin using a home INR monitor.    Anticoagulation Episode Summary     Current INR goal:   2.0-3.0   TTR:   70.5 %   Next INR check:   9/17/2019   INR from last check:   2.20 (9/10/2019)   Weekly max warfarin dose:      Target end date:      INR check location:      Preferred lab:      Send INR reminders to:   [facility] HEART CARE    Indications    Persistent Atrial Fibrillation [I48.91]           Comments:   home monitor talk to wife about dose [facility]   [phone]            Anticoagulation Care Providers     Provider Role Specialty Phone number    Erica Hansen CNP  Cardiology [phone]    Everett Renee MD  Cardiology [phone]

## 2021-06-02 VITALS — HEIGHT: 75 IN | BODY MASS INDEX: 35.06 KG/M2 | WEIGHT: 282 LBS

## 2021-06-02 VITALS — WEIGHT: 282.2 LBS | BODY MASS INDEX: 35.27 KG/M2

## 2021-06-02 VITALS — HEIGHT: 75 IN | WEIGHT: 273 LBS | BODY MASS INDEX: 33.94 KG/M2

## 2021-06-02 VITALS — BODY MASS INDEX: 35.25 KG/M2 | WEIGHT: 282 LBS

## 2021-06-02 NOTE — TELEPHONE ENCOUNTER
Incoming fax from [employer] [facility]     INR date: 10/22    See attached document

## 2021-06-02 NOTE — PROGRESS NOTES
In clinic device check with Device RN and Dr. Renee.  Please see link for full device report.  Patient was informed of results and next follow up during today's visit.

## 2021-06-02 NOTE — PROGRESS NOTES
[facility] Heart Care Note  Assessment:  Atrial atrial fibrillation dating back to 1997 with multiple external cardioversions        Atrial fibrillation is very symptomatic with generalized weakness fatigue but not so much palpitations  Chronic amiodarone was partially effective;Continued for many years ; stopped because of photosensitive and other adverse effects   Failed Tikosyn  Pulmonary vein isolation done on 2 occasions 2011   Post PVI Cardoversion February 12/ 2015 ; CV 10-         Cardioversion  11-        Atrial fibrillation April 25, 2017        CV 5-; relapse < 36 hour  Long-standing advanced dilated cardiomyopathy dating back to 1997       Recent stress nuclear scan; April 30 2014 showed ejection fraction 47% without ischemia        Echocardiogram 21 September 2015 showed ejection fraction equals 50%        Transesophageal echocardiogram December 4, 2017; ejection fraction 50% sinus node dysfunction   MedtronicPacemaker implanted May 1999-dual atrial leads;         generator replacement May 2005          Removal of atrial and ventricular pacing leads with placement of ICD system 6 1 2014 and Medtronic single coil   Chronic anticoagulation with warfarin ; he had excessive skin bleeding from Eliquis   Obesity-substantial weight reduction on conscientious diet   COPD felt be related to previous  exposure   Negative obstructive sleep apnea evaluation   Hypertension  Hyperlipidemia well controlled on statin;   Coronary artery disease with stenting to proximal LAD September 20, 2017  Right hand swelling may be related to the subclavian venous  obstruction      Plan:    Device interrogation shows that you were in atrial fibrillation for 56 days but now are back in a regular rhythm; during atrial fibrillation the heart rate was  just slightly elevated and it was hard for you to correlate symptoms to the atrial fibrillation-not sure how much of your symptoms can be attributable to  the atrial fibrillation  You have been more fatigued and tired and the echocardiogram is shown a drop in ejection fraction 45%  Obtain a stress nuclear scan to further evaluate ejection fraction see if there is been a progression of coronary artery disease  Continue warfarin-keep INR close to 2 because of the hemoptysis  Have device check through transtelephonic monitoring every 3 months  Since you are  back in sinus rhythm, I would recommend continuing the sotalol  Once we feel that we cannot keep you out of atrial fibrillation I would switch sotalol to a more Beta selective Beta blocker     Subjective:    I had the opportunity to see.Buck Sinclair , who is a 74 y.o. male with a known history of  AF   Patient was seen for further evaluation he went into atrial fibrillation August 7 and was in atrial fibrillation for 56 days then he has been out of atrial fibrillation for the last couple weeks  He cannot correlate symptoms of fatigue breathlessness or other symptoms to the presence or absence of atrial fibrillation and was surprised to find that he is no longer in atrial fibrillation since he has not felt any better  He is more fatigued has decreased exercise capacity and perhaps more breathlessness  No clear-cut angina  Echocardiogram October 24, 2019 showed ejection fraction 45% which is reduced from previous studies that showed ejection fraction greater than 50%  Although he has a prominent systolic murmur, the echocardiogram did not show any significant valvular disease  He continues have some hemoptysis mostly blood streaked sputum  Has been seen by the pulmonary specialist and is on 3 inhalers with minimal benefit  His oxygen saturations dropped down to 87% with activity and he has been recommended for home oxygen but has not started this program  Have a history of coronary artery disease and did have LAD stents September 20, 2017  We previously discussed left atrial appendage occlusion but his anatomy is  unfavorable and he is not felt to be candidate for this procedure  Was on Eliquis but had quite a bit of skin bleeding and wanted and was switched to warfarin not interested in switching back for management of hemoptysis      Problem List:  Patient Active Problem List   Diagnosis     Dyslipidemia, goal LDL below 70     Essential hypertension     Persistent Atrial Fibrillation     ICD (implantable cardioverter-defibrillator), dual, in situ     Status post catheter ablation of atrial fibrillation     Coronary artery disease due to calcified coronary lesion     Hemoptysis     COPD, group B, by GOLD 2017 classification (H)     Medical History:  Past Medical History:   Diagnosis Date     Anemia      BPH (benign prostatic hyperplasia)      COPD, group B, by GOLD 2017 classification (H)      Coronary artery disease due to calcified coronary lesion      Dyslipidemia, goal LDL below 70      Essential hypertension      History of cardiomyopathy      History of transfusion      Persistent atrial fibrillation      Pneumonia of left lower lobe due to infectious organism (H) 10/4/2017     Skin cancer of trunk      Status post catheter ablation of atrial fibrillation 6/7/2017    PVI 4-2011 (Cryo/PVI + roof line + CTI line) Re-do PVI 7-2011 (RFA/PVI + CFE + VIDYA + confirmed CTI line)     Ventricular tachycardia (H)      Surgical History:  Past Surgical History:   Procedure Laterality Date     CARDIAC DEFIBRILLATOR PLACEMENT       CARDIOVERSION  07/11/2018    x20, last 2/12/15, 10/2015, 11/18/16, 6/16/17 by Lauren Foster CNP     CARDIOVERSION  07/11/2018     COLONOSCOPY N/A 4/28/2017    Procedure: COLONOSCOPY with 2 ascending polyps and 1 transverse polyp;  Surgeon: Jose Whittington MD;  Location: [facility] GI;  Service:      CV CORONARY ANGIOGRAM N/A 9/20/2017    Procedure: Coronary Angiogram;  Surgeon: Sergio Cervantes MD;  Location: [facility] Cath Lab;  Service:      EP ICD INSERT       FRACTURE SURGERY Left     wrist      INGUINAL HERNIA REPAIR Left     while in the [employer] in [place] after 13 month in [place]     INSERT / REPLACE / REMOVE PACEMAKER       left hand surgery---tendon repair       GA ABLATE HEART DYSRHYTHM FOCUS  2011    Catheter Ablation Atrial Fibrillation PVI 2011 (Cryo+RF-PVI + roof line + CTI line)     GA ABLATE HEART DYSRHYTHM FOCUS  2011    Re-do PVI 2011 (RFA-PVI + CFE + VIDYA + confirmation of CTI line)     GA MYERS W/O FACETEC FORAMOT/DSKC  VRT SEG, CERVICAL      Laminectomy Lumbar;  Recorded: 2012;     TOTAL SHOULDER REPLACEMENT Right 2016    Dr. Abernathy of 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     Comment: [employer]   Social Needs     Financial resource strain: Not on file     Food insecurity:     Worry: Not on file     Inability: Not on file     Transportation needs:     Medical: Not on file     Non-medical: Not on file   Tobacco Use     Smoking status: Former Smoker     Packs/day: 1.00     Years: 4.00     Pack years: 4.00     Types: Cigarettes     Last attempt to quit: 1968     Years since quittin.8     Smokeless tobacco: Never Used   Substance and Sexual Activity     Alcohol use: Yes     Alcohol/week: 2.0 standard drinks     Types: 2 Cans of beer per week     Comment: 1 beer per week     Drug use: No     Sexual activity: Yes     Partners: Female     Birth control/protection: Post-menopausal   Lifestyle     Physical activity:     Days per week: Not on file     Minutes per session: Not on file     Stress: Not on file   Relationships     Social connections:     Talks on phone: Not on file     Gets together: Not on file     Attends Gnosticism service: Not on file     Active  member of club or organization: Not on file     Attends meetings of clubs or organizations: Not on file     Relationship status: Not on file     Intimate partner violence:     Fear of current or ex partner: Not on file     Emotionally abused: Not on file     Physically abused: Not on file     Forced sexual activity: Not on file   Other Topics Concern     Not on file   Social History Narrative     Not on file       Review of Systems:                         General: WNL  Eyes: WNL  Ears/Nose/Throat: WNL  Lungs: WNL  Heart: WNL  Stomach: WNL  Bladder: WNL  Muscle/Joints: WNL  Skin: WNL  Nervous System: WNL  Mental Health: WNL                             Family History:  Family History   Problem Relation Age of Onset     Cancer Mother         leukemia     Cancer Father         bladder     Cancer Sister         breast with lung met.     Aneurysm Sister      CABG Brother      CABG Brother      Valvular heart disease Brother         valve replacement         Allergies:  Allergies   Allergen Reactions     Adhesive Other (See Comments)     ADHESIVE TAPE; SKIN IRRITATION  Foam tape:  Skin removed with tape removal     Amiodarone      ADVERSE REACTION.  Sunlight sensitivity.     Lisinopril Cough       Medications:  Current Outpatient Medications   Medication Sig Dispense Refill     ACETAMINOPHEN (TYLENOL ORAL) Take 1,000 mg by mouth 2 (two) times a day.        amoxicillin (AMOXIL) 500 MG tablet Take prior to the Dentist       ascorbic acid (VITAMIN C) 1000 MG tablet Take 1,000 mg by mouth daily.       atorvastatin (LIPITOR) 40 MG tablet Take 40 mg by mouth at bedtime.       budesonide-formoterol (SYMBICORT) 160-4.5 mcg/actuation inhaler Inhale 2 puffs 2 (two) times a day.       co-enzyme Q-10 30 mg capsule Take 100 mg by mouth daily.       furosemide (LASIX) 20 MG tablet TAKE 1 TABLET(20 MG) BY MOUTH DAILY 90 tablet 3     losartan (COZAAR) 50 MG tablet TAKE 1 TABLET BY MOUTH EVERY DAY 90 tablet 2     MAG 64 64 mg TbEC  "delayed-release tablet TAKE 1 TABLET BY MOUTH TWICE DAILY 180 tablet 1     multivitamin (MULTIVITAMIN) per tablet Take 1 tablet by mouth daily.       omega 3-dha-epa-fish oil (FISH OIL) 1,000 mg (120 mg-180 mg) cap Take 2 tablets by mouth 2 (two) times a day.        polyethylene glycol (MIRALAX) 17 gram packet Take 17 g by mouth daily.       sotalol (BETAPACE) 80 MG tablet TAKE 2 TABLETS(160 MG) BY MOUTH TWICE DAILY 360 tablet 1     tiotropium (SPIRIVA) 18 mcg inhalation capsule Place 18 mcg into inhaler and inhale daily.       vardenafil (LEVITRA) 10 MG tablet Take 10 mg by mouth daily as needed for erectile dysfunction.       VITAMIN D2 50,000 unit capsule Take 50,000 Units by mouth 2 (two) times a week. SUN and WED  3     warfarin (COUMADIN/JANTOVEN) 2.5 MG tablet Take 1-2 tablets (2.5-5 mg total) by mouth daily. Adjust dose per INR results as instructed. 110 tablet 1     warfarin (COUMADIN/JANTOVEN) 5 MG tablet Take as directed  0     albuterol (PROAIR HFA;PROVENTIL HFA;VENTOLIN HFA) 90 mcg/actuation inhaler Inhale 2 puffs every 6 (six) hours as needed for wheezing. 1 Inhaler 11     No current facility-administered medications for this visit.          Surgical site  The ICD is well-healed to right subclavicular region    Device interrogation  Currently sinus rhythm with heart rates in the 60s  Augmentation of 56-day episode of atrial fibrillation.  During atrial fibrillation ventricular rate is minimally elevated with average heart rate of about 75.  There does seem to be reduced activity during atrial fibrillation  During atrial fibrillation there seem to be an elevation of OptiVol consistent with more fluid congestion-better with restoration normal sinus rhythm-not absolute correlation     Objective:   Vital signs:  /68 (Patient Site: Right Arm, Patient Position: Sitting, Cuff Size: Adult Large)   Pulse 60   Resp 16   Ht 6' 3\" (1.905 m)   Wt (!) 280 lb 12.8 oz (127.4 kg)   BMI 35.10 kg/m  "       Physical Exam:    GENERAL APPEARANCE: Alert, cooperative and in no acute distress.  HEENT: No scleral icterus. No Xanthelasma. Oral mucous membranes pink and moist.  NECK: JVP difficult to assesscm. No Hepatojugular reflux. Thyroid not  Palpable  CHEST: clear to auscultation and percussion  CARDIOVASCULAR: Rather prominent systolic ejection murmur (echocardiogram does not show significant valve disease)    Brachial, radial  pulses are intact and symmetric.   No carotid bruits noted.  ABDOMEN: Non tender. BS+. No bruits.  EXTREMITIES: No cyanosis, clubbing or edema.    Lab Results:  LIPIDS:  Lab Results   Component Value Date    CHOL 127 08/02/2019    CHOL 133 12/04/2018    CHOL 142 05/02/2018     Lab Results   Component Value Date    HDL 47 08/02/2019    HDL 52 12/04/2018    HDL 53 05/02/2018     Lab Results   Component Value Date    LDLCALC 65 08/02/2019    LDLCALC 69 12/04/2018    LDLCALC 80 05/02/2018     Lab Results   Component Value Date    TRIG 74 08/02/2019    TRIG 59 12/04/2018    TRIG 46 05/02/2018     No components found for: CHOLHDL    BMP:  Lab Results   Component Value Date    CREATININE 1.14 08/02/2019    BUN 29 (H) 08/02/2019     08/02/2019    K 4.6 08/02/2019     (H) 08/02/2019    CO2 21 (L) 08/02/2019         This note has been dictated using voice recognition software. Any grammatical or context distortions are unintentional and inherent to the software.  Everett Renee MD  [facility]  [phone]

## 2021-06-02 NOTE — TELEPHONE ENCOUNTER
Anticoagulation Annual Referral Renewal Review    Buck Sinclair's chart reviewed for annual renewal of referral to anticoagulation monitoring.        Criteria for anticoagulation nurse and/or pharmacist renewal met   Warfarin indication: Atrial Fibrillation Yes, per indication   Current with INR monitoring/compliant Yes  Patient has a home monitor Yes   Date of last office visit Scheduled OV with Dr Renee with [facility] on 10/31/19 see note   )         Buckbradley Sinclair met all criteria for anticoagulation management program initiated renewal.  New INR standing orders and anticoagulation referral renewal placed.      Gisele Prince RN  12:57 PM

## 2021-06-02 NOTE — TELEPHONE ENCOUNTER
ANTICOAGULATION  MANAGEMENT    Assessment     Today's INR result of 2.9 is Therapeutic (goal INR of 2.0-3.0)        Warfarin taken as previously instructed    Decreased greens/vitamin K intake and had cocktails during the weekend  may be affecting INR    No new medication/supplements affecting INR    Continues to tolerate warfarin with no reported s/s of bleeding or thromboembolism     Previous INR was Therapeutic    Plan:     Spoke with patient's spouse Neela regarding INR result and instructed:     Warfarin Dosing Instructions:  Continue current warfarin dose    5 mg every Mon, Fri; 2.5 mg all other days     (0 % change)    Instructed patient to follow up no later than: one week with home monitor    Education provided: importance of consistent vitamin K intake, potential interaction between warfarin and alcohol and importance of therapeutic range    Neela verbalizes understanding and agrees to warfarin dosing plan.    Instructed to call the [facility] for any changes, questions or concerns. (#[phone])   ?   Gisele Prince RN    Subjective/Objective:      Buck Sinclair, a 74 y.o. male is on warfarin.     Buck reports:     Home warfarin dose: verbally confirmed home dose with Neela and updated on anticoagulation calendar     Missed doses: No     Medication changes:  No     S/S of bleeding or thromboembolism:  No     New Injury or illness:  No     Changes in diet or alcohol consumption:  Yes: cocktails during the weekend and eating was different due to they have company but is planning to go back to their routine moving forward.     Upcoming surgery, procedure or cardioversion:  No    Anticoagulation Episode Summary     Current INR goal:   2.0-3.0   TTR:   75.3 %   Next INR check:   10/29/2019   INR from last check:   2.90 (10/22/2019)   Weekly max warfarin dose:      Target end date:      INR check location:      Preferred lab:      Send INR reminders to:   [facility] HEART [facility]     Indications    Persistent Atrial Fibrillation [I48.91]           Comments:   home monitor talk to wife about dose Neela   [phone]            Anticoagulation Care Providers     Provider Role Specialty Phone number    Erica Hansen, CNP  Cardiology [phone]    Everett Renee MD  Cardiology [phone]

## 2021-06-02 NOTE — TELEPHONE ENCOUNTER
Incoming fax from [employer] [facility] home monitoring     INR date: 10/8    See attached document

## 2021-06-02 NOTE — TELEPHONE ENCOUNTER
Received a faxed INR result for Buck Sinclair  From [employer] [facility]    INR result dated 10/15/2019 is 2.0

## 2021-06-02 NOTE — PATIENT INSTRUCTIONS - HE
Buck Sinclair    Thank you for coming to the [facility] today for a cardiac evaluation  It was my pleasure to see you today  A good contact for any questions would be Val Angeles  RN @ [phone]    Device interrogation shows that you were in atrial fibrillation for 56 days but now are back in a regular rhythm; during atrial fibrillation the heart rate was  just slightly elevated and it was hard for you to correlate symptoms to the atrial fibrillation-not sure how much of your symptoms can be attributable to the atrial fibrillation  You have been more fatigued and tired and the echocardiogram is shown a drop in ejection fraction 45%  Obtain a stress nuclear scan to further evaluate ejection fraction see if there is been a progression of coronary artery disease  Continue warfarin-keep INR close to 2 because of the hemoptysis  Have device check through transtelephonic monitoring every 3 months  Since you are  back in sinus rhythm, I would recommend continuing the sotalol  Once we feel that we cannot keep you out of atrial fibrillation I would switch sotalol to a more Beta selective Beta blocker

## 2021-06-02 NOTE — TELEPHONE ENCOUNTER
ANTICOAGULATION  MANAGEMENT-Patient Home Monitoring Result    Assessment     Therapeutic INR result of 2.0 (goal INR of 2.0-3.0) received via fax from [employer] home INR monitoring company.        Previous INR was Therapeutic    Buck Sinclair last contacted by phone: 7/23/19    Plan     Per home monitoring agreement with patient, patient was NOT contacted regarding therapeutic result today.  Patient is to continue current dose and continue to check INR with home monitor per protocol.  ?   Gisele Prince RN    Subjective/Objective:      Buck Sinclair, a 74 y.o. male  is established on warfarin using a home INR monitor.    Anticoagulation Episode Summary     Current INR goal:   2.0-3.0   TTR:   75.3 %   Next INR check:   10/22/2019   INR from last check:   2.00 (10/15/2019)   Weekly max warfarin dose:      Target end date:      INR check location:      Preferred lab:      Send INR reminders to:   [facility] CARE    Indications    Persistent Atrial Fibrillation [I48.91]           Comments:   home monitor talk to wife about dose [facility]   [phone]            Anticoagulation Care Providers     Provider Role Specialty Phone number    Erica Hansen CNP  Cardiology [phone]    Everett Renee MD  Cardiology [phone]

## 2021-06-02 NOTE — TELEPHONE ENCOUNTER
ANTICOAGULATION  MANAGEMENT-Patient Home Monitoring Result    Assessment     Therapeutic INR result of 2.3 (goal INR of 2.0-3.0) received via fax from [employer] home INR monitoring company.        Previous INR was Therapeutic    Buck Sinclair last contacted by phone: 7/23/19    Plan     Per home monitoring agreement with patient, patient was NOT contacted regarding therapeutic result today.  Patient is to continue current dose and continue to check INR with home monitor per protocol.  ?   Gisele Prince RN    Subjective/Objective:      Buck Sinclair, a 74 y.o. male  is established on warfarin using a home INR monitor.    Anticoagulation Episode Summary     Current INR goal:   2.0-3.0   TTR:   75.1 %   Next INR check:   10/15/2019   INR from last check:   2.30 (10/8/2019)   Weekly max warfarin dose:      Target end date:      INR check location:      Preferred lab:      Send INR reminders to:   [facility] HEART CARE    Indications    Persistent Atrial Fibrillation [I48.91]           Comments:   home monitor talk to wife about dose [facility]   [phone]            Anticoagulation Care Providers     Provider Role Specialty Phone number    Erica Hansen CNP  Cardiology [phone]    Everett Renee MD  Cardiology [phone]

## 2021-06-03 VITALS
SYSTOLIC BLOOD PRESSURE: 116 MMHG | RESPIRATION RATE: 16 BRPM | WEIGHT: 280.8 LBS | DIASTOLIC BLOOD PRESSURE: 68 MMHG | BODY MASS INDEX: 34.91 KG/M2 | HEIGHT: 75 IN | HEART RATE: 60 BPM

## 2021-06-03 VITALS
SYSTOLIC BLOOD PRESSURE: 120 MMHG | HEIGHT: 75 IN | HEART RATE: 88 BPM | DIASTOLIC BLOOD PRESSURE: 86 MMHG | BODY MASS INDEX: 35.06 KG/M2 | RESPIRATION RATE: 16 BRPM | WEIGHT: 282 LBS

## 2021-06-03 VITALS — BODY MASS INDEX: 34.38 KG/M2 | WEIGHT: 276.5 LBS | HEIGHT: 75 IN

## 2021-06-03 VITALS — BODY MASS INDEX: 35.06 KG/M2 | HEIGHT: 75 IN | WEIGHT: 282 LBS

## 2021-06-03 NOTE — TELEPHONE ENCOUNTER
ANTICOAGULATION  MANAGEMENT-Patient Home Monitoring Result    Assessment     Therapeutic INR result of 2.9 (goal INR of 2.0-3.0) received via fax from [employer] home INR monitoring company.        Previous INR was Therapeutic    Buck Sinclair last contacted by phone: 11/12/19    Plan     Per home monitoring agreement with patient, patient was NOT contacted regarding therapeutic result today.  Patient is to continue current dose and continue to check INR with home monitor per protocol.  ?   Gisele Prince RN    Subjective/Objective:      Buck Sinclair, a 74 y.o. male  is established on warfarin using a home INR monitor.    Anticoagulation Episode Summary     Current INR goal:   2.0-3.0   TTR:   74.1 % (1 y)   Next INR check:   12/10/2019   INR from last check:   2.90 (12/3/2019)   Weekly max warfarin dose:      Target end date:      INR check location:      Preferred lab:      Send INR reminders to:   [facility] HEART CARE    Indications    Persistent Atrial Fibrillation [I48.91]           Comments:   home monitor talk to wife about dose [facility]   [phone]            Anticoagulation Care Providers     Provider Role Specialty Phone number    Erica Hansen CNP  Cardiology [phone]    Everett Renee MD  Electrophysiology [phone]                 Mastoid Interpolation Flap Text: A decision was made to reconstruct the defect utilizing an interpolation axial flap and a staged reconstruction.  A telfa template was made of the defect.  This telfa template was then used to outline the mastoid interpolation flap.  The donor area for the pedicle flap was then injected with anesthesia.  The flap was excised through the skin and subcutaneous tissue down to the layer of the underlying musculature.  The pedicle flap was carefully excised within this deep plane to maintain its blood supply.  The edges of the donor site were undermined.   The donor site was closed in a primary fashion.  The pedicle was then rotated into position and sutured.  Once the tube was sutured into place, adequate blood supply was confirmed with blanching and refill.  The pedicle was then wrapped with xeroform gauze and dressed appropriately with a telfa and gauze bandage to ensure continued blood supply and protect the attached pedicle.

## 2021-06-03 NOTE — TELEPHONE ENCOUNTER
Received a faxed INR result for Buck Sinclair  From [employer] [facility]    INR result dated 11/5/2019 is 1.9

## 2021-06-03 NOTE — TELEPHONE ENCOUNTER
Received a faxed INR result for Buck Sinclair  From [employer] [facility]    INR result dated 12/3/2019 is 2.9

## 2021-06-03 NOTE — TELEPHONE ENCOUNTER
Received a faxed INR result for Buck Sinclair  From [facility]    INR result dated 11/12/2019 is 2.5

## 2021-06-03 NOTE — TELEPHONE ENCOUNTER
Incoming fax from [employer] [facility] home monitoring     INR date: 11/26    See attached document

## 2021-06-03 NOTE — TELEPHONE ENCOUNTER
Incoming fax from [employer] [facility] home monitoring     INR date: 11/19    See attached document

## 2021-06-03 NOTE — TELEPHONE ENCOUNTER
ANTICOAGULATION  MANAGEMENT-Patient Home Monitoring Result    Assessment     Therapeutic INR result of 2.4 (goal INR of 2.0-3.0) received via fax from [employer]   home INR monitoring company.        Previous INR was Therapeutic    Buck Sinclair last contacted by phone: 11/12/19    Plan     Per home monitoring agreement with patient, patient was NOT contacted regarding therapeutic result today.  Patient is to continue current dose and continue to check INR with home monitor per protocol.  ?   Gisele Prince RN    Subjective/Objective:      Buck Sinclair, a 74 y.o. male  is established on warfarin using a home INR monitor.    Anticoagulation Episode Summary     Current INR goal:   2.0-3.0   TTR:   74.1 %   Next INR check:   11/26/2019   INR from last check:   2.40 (11/19/2019)   Weekly max warfarin dose:      Target end date:      INR check location:      Preferred lab:      Send INR reminders to:   [facility]    Indications    Persistent Atrial Fibrillation [I48.91]           Comments:   home monitor talk to wife about dose [facility]   [phone]            Anticoagulation Care Providers     Provider Role Specialty Phone number    Erica Hansen CNP  Cardiology [phone]    Everett Renee MD  Cardiology [phone]

## 2021-06-03 NOTE — TELEPHONE ENCOUNTER
ANTICOAGULATION  MANAGEMENT    Assessment     Today's INR result of 2.5 is Therapeutic (goal INR of 2.0-3.0)        Warfarin taken as previously instructed    No new diet changes affecting INR    No new medication/supplements affecting INR    Continues to tolerate warfarin with no reported s/s of bleeding or thromboembolism     Previous INR was Subtherapeutic    Plan:     Spoke with patient's spouse ,Neela regarding INR result and instructed:     Warfarin Dosing Instructions:  Continue current warfarin dose    5 mg every Mon, Wed, Fri; 2.5 mg all other days     (0 % change)    Instructed patient to follow up no later than: 1-2 weeks with home monitor.    Education provided: importance of therapeutic range    Neela verbalizes understanding and agrees to warfarin dosing plan.    Instructed to call the [facility] for any changes, questions or concerns. (#[phone])   ?   Gisele Prince RN    Subjective/Objective:      Buck Sinclair, a 74 y.o. male is on warfarin.     Neela Jane reports:     Home warfarin dose: verbally confirmed home dose with Neela and updated on anticoagulation calendar     Missed doses: No     Medication changes:  No     S/S of bleeding or thromboembolism:  No     New Injury or illness:  No     Changes in diet or alcohol consumption:  No     Upcoming surgery, procedure or cardioversion:  No    Anticoagulation Episode Summary     Current INR goal:   2.0-3.0   TTR:   74.1 %   Next INR check:   11/19/2019   INR from last check:   2.50 (11/12/2019)   Weekly max warfarin dose:      Target end date:      INR check location:      Preferred lab:      Send INR reminders to:   [facility]ART CARE    Indications    Persistent Atrial Fibrillation [I48.91]           Comments:   home monitor talk to wife about dose Neela   [phone]            Anticoagulation Care Providers     Provider Role Specialty Phone number    Erica Hansen, CNP  Cardiology [phone]    Thelma  Everett CHANDRA MD  Cardiology [phone]

## 2021-06-03 NOTE — TELEPHONE ENCOUNTER
ANTICOAGULATION  MANAGEMENT    Assessment     Today's INR result of 1.9 is Subtherapeutic (goal INR of 2.0-3.0)        Warfarin taken as previously instructed    No new diet changes affecting INR    No new medication/supplements affecting INR    Continues to tolerate warfarin with no reported s/s of bleeding or thromboembolism     Previous INR was Therapeutic     Will plan to adjust doses due to INR is trending down    Plan:     Spoke with patient spouse Neela regarding INR result and instructed:     Warfarin Dosing Instructions:  Change warfarin dose to    5 mg every Mon, Wed, Fri; 2.5 mg all other days     (11 % change)    Instructed patient to follow up no later than: one week with home monitor    Education provided: importance of consistent vitamin K intake, importance of therapeutic range and target INR goal and significance of current INR result    Neela verbalizes understanding and agrees to warfarin dosing plan.    Instructed to call the [facility] for any changes, questions or concerns. (#[phone])   ?   Gisele Prince RN    Subjective/Objective:      Buck Sinclair, a 74 y.o. male is on warfarin.     Buck reports:     Home warfarin dose: verbally confirmed home dose with Neela and updated on anticoagulation calendar     Missed doses: No     Medication changes:  No     S/S of bleeding or thromboembolism:  No     New Injury or illness:  No     Changes in diet or alcohol consumption:  No     Upcoming surgery, procedure or cardioversion:  No    Anticoagulation Episode Summary     Current INR goal:   2.0-3.0   TTR:   74.4 %   Next INR check:   11/12/2019   INR from last check:   2.10 (10/29/2019)   Weekly max warfarin dose:      Target end date:      INR check location:      Preferred lab:      Send INR reminders to:   [facility]    Indications    Persistent Atrial Fibrillation [I48.91]           Comments:   home monitor talk to wife about dose Neela   [phone]             Anticoagulation Care Providers     Provider Role Specialty Phone number    Erica Hansen, CNP  Cardiology [phone]    Everett Renee MD  Cardiology [phone]

## 2021-06-03 NOTE — TELEPHONE ENCOUNTER
ANTICOAGULATION  MANAGEMENT-Patient Home Monitoring Result    Assessment     Therapeutic INR result of 2.4 (goal INR of 2.0-3.0) received via fax from [employer] home INR monitoring company.        Previous INR was Therapeutic    Buck Sinclair last contacted by phone: 11/12/19    Plan     Per home monitoring agreement with patient, patient was NOT contacted regarding therapeutic result today.  Patient is to continue current dose and continue to check INR with home monitor per protocol.  ?   Gisele Prince RN    Subjective/Objective:      Buck Sinclair, a 74 y.o. male  is established on warfarin using a home INR monitor.    Anticoagulation Episode Summary     Current INR goal:   2.0-3.0   TTR:   74.1 % (1 y)   Next INR check:   12/3/2019   INR from last check:   2.40 (11/26/2019)   Weekly max warfarin dose:      Target end date:      INR check location:      Preferred lab:      Send INR reminders to:   [facility] CARE    Indications    Persistent Atrial Fibrillation [I48.91]           Comments:   home monitor talk to wife about dose [facility]   [phone]            Anticoagulation Care Providers     Provider Role Specialty Phone number    Erica Hansen CNP  Cardiology [phone]    Everett Renee MD  Electrophysiology [phone]

## 2021-06-04 VITALS
DIASTOLIC BLOOD PRESSURE: 77 MMHG | SYSTOLIC BLOOD PRESSURE: 136 MMHG | OXYGEN SATURATION: 92 % | BODY MASS INDEX: 34.82 KG/M2 | WEIGHT: 280 LBS | HEART RATE: 55 BPM | HEIGHT: 75 IN

## 2021-06-04 VITALS
WEIGHT: 288 LBS | BODY MASS INDEX: 35.81 KG/M2 | RESPIRATION RATE: 12 BRPM | SYSTOLIC BLOOD PRESSURE: 112 MMHG | DIASTOLIC BLOOD PRESSURE: 82 MMHG | OXYGEN SATURATION: 90 % | HEART RATE: 60 BPM | HEIGHT: 75 IN

## 2021-06-04 VITALS
WEIGHT: 280 LBS | DIASTOLIC BLOOD PRESSURE: 68 MMHG | SYSTOLIC BLOOD PRESSURE: 130 MMHG | HEART RATE: 52 BPM | OXYGEN SATURATION: 88 % | BODY MASS INDEX: 35 KG/M2

## 2021-06-04 VITALS
DIASTOLIC BLOOD PRESSURE: 76 MMHG | SYSTOLIC BLOOD PRESSURE: 104 MMHG | WEIGHT: 275 LBS | HEART RATE: 63 BPM | HEIGHT: 75 IN | OXYGEN SATURATION: 97 % | BODY MASS INDEX: 34.19 KG/M2

## 2021-06-04 VITALS
WEIGHT: 299 LBS | RESPIRATION RATE: 20 BRPM | DIASTOLIC BLOOD PRESSURE: 78 MMHG | HEART RATE: 80 BPM | HEIGHT: 75 IN | SYSTOLIC BLOOD PRESSURE: 108 MMHG | BODY MASS INDEX: 37.18 KG/M2

## 2021-06-04 VITALS — WEIGHT: 275 LBS | HEIGHT: 75 IN | BODY MASS INDEX: 34.19 KG/M2

## 2021-06-04 NOTE — TELEPHONE ENCOUNTER
Incoming INR from [facility].  INR drawn 12:24: 2.5

## 2021-06-04 NOTE — TELEPHONE ENCOUNTER
ANTICOAGULATION  MANAGEMENT    Assessment     Today's INR result of 2.7 is Therapeutic (goal INR of 2.0-3.0)        Warfarin taken as previously instructed    Increased greens/vitamin K intake may be affecting INR    No new medication/supplements affecting INR    Continues to tolerate warfarin with no reported s/s of bleeding or thromboembolism     Previous INR was Supratherapeutic    Plan:     Spoke with patient's spouse neela regarding INR result and instructed:     Warfarin Dosing Instructions:  Continue current warfarin dose    5 mg every Mon, Wed, Fri; 2.5 mg all other days      (0 % change)    Instructed patient to follow up no later than: one week with home monitor    Education provided: importance of consistent vitamin K intake, vitamin K content of foods and importance of therapeutic range   Made aware that [facility] will not call for therapeutic INR next week per protocol.    Neela verbalizes understanding and agrees to warfarin dosing plan.    Instructed to call the [facility] for any changes, questions or concerns. (#[phone])   ?   Gisele Prince RN    Subjective/Objective:      Buck Sinclair, a 74 y.o. male is on warfarin.     Buck reports:     Home warfarin dose: verbally confirmed home dose with Neela and updated on anticoagulation calendar     Missed doses: No     Medication changes:  No     S/S of bleeding or thromboembolism:  No     New Injury or illness:  No     Changes in diet or alcohol consumption:  Yes: increased greens.     Upcoming surgery, procedure or cardioversion:  No    Anticoagulation Episode Summary     Current INR goal:   2.0-3.0   TTR:   73.9 % (1 y)   Next INR check:   12/24/2019   INR from last check:   2.70 (12/17/2019)   Weekly max warfarin dose:      Target end date:      INR check location:      Preferred lab:      Send INR reminders to:   [facility] C[facility]    Indications    Persistent Atrial Fibrillation [I48.91]           Comments:   home monitor talk  to wife about dose Neela   [phone]            Anticoagulation Care Providers     Provider Role Specialty Phone number    Eriac Hansen, CNP  Cardiology [phone]    Everett Renee MD  Cardiology [phone]

## 2021-06-04 NOTE — TELEPHONE ENCOUNTER
ANTICOAGULATION  MANAGEMENT-Patient Home Monitoring Result    Assessment     Therapeutic INR result of 2.5 (goal INR of 2.0-3.0) received via fax from [employer] home INR monitoring company.        Previous INR was Therapeutic    Buck Sinclair last contacted by phone: 12/17/19    Plan     Per home monitoring agreement with patient, patient was NOT contacted regarding therapeutic result today.  Patient is to continue current dose and continue to check INR with home monitor per protocol.  ?   Gisele Prince RN    Subjective/Objective:      Buck Sinclair, a 74 y.o. male  is established on warfarin using a home INR monitor.    Anticoagulation Episode Summary     Current INR goal:   2.0-3.0   TTR:   74.2 % (1 y)   Next INR check:   12/31/2019   INR from last check:   2.50 (12/24/2019)   Weekly max warfarin dose:      Target end date:      INR check location:      Preferred lab:      Send INR reminders to:   [facility] C[facility]    Indications    Persistent Atrial Fibrillation [I48.91]           Comments:   home monitor talk to wife about dose [facility]   [phone]            Anticoagulation Care Providers     Provider Role Specialty Phone number    Erica Hansen CNP  Cardiology [phone]    Everett Renee MD  Cardiology [phone]

## 2021-06-04 NOTE — TELEPHONE ENCOUNTER
ANTICOAGULATION  MANAGEMENT-Patient Home Monitoring Result    Assessment     Therapeutic INR result of 2.9 (goal INR of 2.0-3.0) received via fax from [employer] home INR monitoring company.        Previous INR was Therapeutic    Buck Sinclair last contacted by phone: 12/17/19    Plan     Per home monitoring agreement with patient, patient was NOT contacted regarding therapeutic result today.  Patient is to continue current dose and continue to check INR with home monitor per protocol.  ?   Gisele Prince RN    Subjective/Objective:      Buck Sinclair, a 74 y.o. male  is established on warfarin using a home INR monitor.    Anticoagulation Episode Summary     Current INR goal:   2.0-3.0   TTR:   74.2 % (1 y)   Next INR check:   1/7/2020   INR from last check:   2.90 (12/31/2019)   Weekly max warfarin dose:      Target end date:      INR check location:      Preferred lab:      Send INR reminders to:   [facility]ART CARE    Indications    Persistent Atrial Fibrillation [I48.91]           Comments:   home monitor talk to wife about dose [facility]   [phone]            Anticoagulation Care Providers     Provider Role Specialty Phone number    Erica Hansen CNP  Cardiology [phone]    Everett Renee MD  Cardiology [phone]

## 2021-06-04 NOTE — TELEPHONE ENCOUNTER
Received a faxed INR result for Buck Sinclair  From [employer] [facility]    INR result dated 12/10/2019 is 3.2

## 2021-06-04 NOTE — TELEPHONE ENCOUNTER
Received a faxed INR result for Buck Sinclair  From [facility]    INR result dated 12/17/2019 is 2.7

## 2021-06-04 NOTE — TELEPHONE ENCOUNTER
ANTICOAGULATION  MANAGEMENT    Assessment     Today's INR result of 3.2 is Supratherapeutic (goal INR of 2.0-3.0)        Warfarin taken as previously instructed    Decreased greens/vitamin K intake and had beer yesterdaymay be affecting INR    No new medication/supplements affecting INR    Continues to tolerate warfarin with no reported s/s of bleeding or thromboembolism     Previous INR was Therapeutic    Plan:     Spoke with patient's spouse Neela regarding INR result and instructed:     Warfarin Dosing Instructions:  Continue current warfarin dose    5 mg every Mon, Wed, Fri; 2.5 mg all other days     (0 % change)  Neela stated that she will have the patient eat broccoli with supper today to help bring INR down.    Instructed patient to follow up no later than: one week with home monitor    Education provided: impact of vitamin K foods on INR, potential interaction between warfarin and alcohol, importance of therapeutic range and target INR goal and significance of current INR result    Neela verbalizes understanding and agrees to warfarin dosing plan.    Instructed to call the [facility] for any changes, questions or concerns. (#[phone])   ?   Gisele Prince RN    Subjective/Objective:      Buck Sinclair, a 74 y.o. male is on warfarin.     Buck reports:     Home warfarin dose: verbally confirmed home dose with Neela and updated on anticoagulation calendar     Missed doses: No     Medication changes:  No     S/S of bleeding or thromboembolism:  No     New Injury or illness:  No     Changes in diet or alcohol consumption:  Yes: had beer yesterday and had less greens in the past week.     Upcoming surgery, procedure or cardioversion:  No    Anticoagulation Episode Summary     Current INR goal:   2.0-3.0   TTR:   72.8 % (1 y)   Next INR check:   12/17/2019   INR from last check:   3.20! (12/10/2019)   Weekly max warfarin dose:      Target end date:      INR check location:      Preferred lab:       Send INR reminders to:   [facility] CARE    Indications    Persistent Atrial Fibrillation [I48.91]           Comments:   home monitor talk to wife about dose Neela   [phone]            Anticoagulation Care Providers     Provider Role Specialty Phone number    Erica Hansen CNP  Cardiology [phone]    Everett Renee MD  Electrophysiology [phone]

## 2021-06-05 NOTE — TELEPHONE ENCOUNTER
PA for Losartan came through. Called [id number]: [facility]ABATUN[facility], CALL [phone] AUTHORIZATION [place](PHARMACY HELP DESK [phone])  Left message to call me or call work comp at [phone].

## 2021-06-05 NOTE — TELEPHONE ENCOUNTER
Incoming fax from [facility] home [facility]     INR date: 1/7    See attached document

## 2021-06-05 NOTE — TELEPHONE ENCOUNTER
ANTICOAGULATION  MANAGEMENT-Patient Home Monitoring Result    Assessment     Therapeutic INR result of 3.0 (goal INR of 2.0-3.0) received via fax from [employer] home INR monitoring company.        Previous INR was Therapeutic    Buck Sinclair last contacted by phone: 12/17/19    Plan     Per home monitoring agreement with patient, patient was NOT contacted regarding therapeutic result today.  Patient is to continue current dose and continue to check INR with home monitor per protocol.  ?   Gisele Prince RN    Subjective/Objective:      Buck Sinclair, a 74 y.o. male  is established on warfarin using a home INR monitor.    Anticoagulation Episode Summary     Current INR goal:   2.0-3.0   TTR:   74.2 % (1 y)   Next INR check:   1/14/2020   INR from last check:   3.00 (1/7/2020)   Weekly max warfarin dose:      Target end date:      INR check location:      Preferred lab:      Send INR reminders to:   [facility] HEART CARE    Indications    Persistent Atrial Fibrillation [I48.91]           Comments:   home monitor talk to wife about dose [facility]   [phone]            Anticoagulation Care Providers     Provider Role Specialty Phone number    Erica Hansen CNP  Cardiology [phone]    Everett Renee MD  Cardiology [phone]

## 2021-06-05 NOTE — TELEPHONE ENCOUNTER
Lab Results   Component Value Date    INR 3.20 (!) 01/21/2020    INR 2.90 (!) 01/14/2020    INR 3.00 (!) 01/07/2020       Patient's current Warfarin doses: 5 mg Mon Wed Fri;2.5 mg all other days      Next INR check is on 1/28 with home monitor      Patient's last OV with HCC was on 10/31/2019    Warfarin prescription 3 month supply and one refill sent to patient's pharmacy today.    Giseel Prince RN

## 2021-06-05 NOTE — PROGRESS NOTES
Pulmonary Clinic Follow-up Visit    Assessment/Plan:  72 year old male with a history of tobacco dependence in remission, CAD s/p PCI/stents, afib s/p PVL with ICD/PPM on anticoagulation, history of cavitary lesion and extensive pneumonia in LLL in Oct 2017, subsequent recurrent LLL pneumonia, since resolved, presenting for follow up. He appears to be stable from a respiratory standpoint without any further hemoptysis. Dyspnea on exertion is stable but does seem to limit him significantly, especially in the winter.    Plan:  #COPD, GOLD class B (CAT >10, few exacerbations/low risk for hospitalization). Minimal smoking history so this is probably due to his work as a   - continue budesonide-formoterol 160-4.5 mcg two inhalations BID with spacer; rinse/gargle/spit water after use. No changes today due to effects of the winter weather on his breathing.  - continue tiotropium HandiHaler one inhalation daily  - Cont albuterol as needed  - encouraged exercise, weight loss as able  - he declines supplemental oxygen.  - will discuss utility of RHC with his cardiologist, given the exertional hypoxemia and LIMA which seems out of proportion to his PFTs   - UTD w/ pneumonia and flu vaccines. Rec flu shot every Fall.    #LLL nodule: decreasing in size on 3 month f/u scan after abx. 9mm -> 7mm and associated with scarring  - no further imaging needed unless recurrent symptoms    #Hemoptysis: no further recurrence. Likely was due to the prior LLL pneumonia in the setting of anticoagulation.  - continue INR goal close to 2, as discussed with Dr. Renee   - he'll let me know if any recurrence.    Follow up in 3 months for reassessment.     CCx: follow up of hemoptysis, and COPD GOLD class B    HPI: Interim history: I last saw Buck on 7/17/2019. Since that time, he's been doing about the same. He does feel his breathing limits him especially in the winter and with activities.  He seems depressed about this. Not  interested in oxygen at last visit.  Taking inhalers faithfully.  Has completed pulmonary rehab, didn't find it particularly helpful.    rarely coughs up any blood these days. Cough mostly dry.    ROS:  A 12-system review was obtained and was negative with the exception of the symptoms endorsed in the history of present illness.    PMH:  Past Medical History:   Diagnosis Date     Anemia      BPH (benign prostatic hyperplasia)      COPD, group B, by GOLD 2017 classification (H)      Coronary artery disease due to calcified coronary lesion      Dyslipidemia, goal LDL below 70      Essential hypertension      History of cardiomyopathy      History of transfusion      Persistent atrial fibrillation      Pneumonia of left lower lobe due to infectious organism (H) 10/4/2017     Skin cancer of trunk      Status post catheter ablation of atrial fibrillation 6/7/2017    PVI 4-2011 (Cryo/PVI + roof line + CTI line) Re-do PVI 7-2011 (RFA/PVI + CFE + VIDYA + confirmed CTI line)     Ventricular tachycardia (H)        PSH:  Past Surgical History:   Procedure Laterality Date     CARDIAC DEFIBRILLATOR PLACEMENT       CARDIOVERSION  07/11/2018    x20, last 2/12/15, 10/2015, 11/18/16, 6/16/17 by Lauren Foster CNP     CARDIOVERSION  07/11/2018     COLONOSCOPY N/A 4/28/2017    Procedure: COLONOSCOPY with 2 ascending polyps and 1 transverse polyp;  Surgeon: Jose Whittington MD;  Location: [facility] GI;  Service:      CV CORONARY ANGIOGRAM N/A 9/20/2017    Procedure: Coronary Angiogram;  Surgeon: Sergio Cervantes MD;  Location: [facility] Cath Lab;  Service:      EP ICD INSERT       FRACTURE SURGERY Left     wrist     INGUINAL HERNIA REPAIR Left 1967    while in the [employer] in [employer] after 13 month in [place]     INSERT / REPLACE / REMOVE PACEMAKER       left hand surgery---tendon repair       MO ABLATE HEART DYSRHYTHM FOCUS  04/2011    Catheter Ablation Atrial Fibrillation PVI Apr 2011 (Cryo+RF-PVI + roof line + CTI line)     MO ABLATE  HEART DYSRHYTHM FOCUS  2011    Re-do PVI 2011 (RFA-PVI + CFE + VIDYA + confirmation of CTI line)     ID MYERS W/O FACETEC FORAMOT/DSKC  VRT SEG, CERVICAL      Laminectomy Lumbar;  Recorded: 2012;     TOTAL SHOULDER REPLACEMENT Right 2016    Dr. Abernathy of [facility]       Allergies:  Allergies   Allergen Reactions     Adhesive Other (See Comments)     ADHESIVE TAPE; SKIN IRRITATION  Foam tape:  Skin removed with tape removal     Amiodarone      ADVERSE REACTION.  Sunlight sensitivity.     Lisinopril Cough       Family HX:  Family History   Problem Relation Age of Onset     Cancer Mother         leukemia     Cancer Father         bladder     Cancer Sister         breast with lung met.     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     Comment: [employer]   Social Needs     Financial resource strain: Not on file     Food insecurity:     Worry: Not on file     Inability: Not on file     Transportation needs:     Medical: Not on file     Non-medical: Not on file   Tobacco Use     Smoking status: Former Smoker     Packs/day: 1.00     Years: 4.00     Pack years: 4.00     Types: Cigarettes     Last attempt to quit: 1968     Years since quittin.0     Smokeless tobacco: Never Used   Substance and Sexual Activity     Alcohol use: Yes     Alcohol/week: 2.0 standard drinks     Types: 2 Cans of beer per week     Comment: 1 beer per week     Drug use: No     Sexual activity: Yes     Partners: Female     Birth control/protection: Post-menopausal   Lifestyle     Physical activity:     Days per week: Not on file      Minutes per session: Not on file     Stress: Not on file   Relationships     Social connections:     Talks on phone: Not on file     Gets together: Not on file     Attends [employer] service: Not on file     Active member of club or organization: Not on file     Attends meetings of clubs or organizations: Not on file     Relationship status: Not on file     Intimate partner violence:     Fear of current or ex partner: Not on file     Emotionally abused: Not on file     Physically abused: Not on file     Forced sexual activity: Not on file   Other Topics Concern     Not on file   Social History Narrative     Not on file       Current Meds:  Current Outpatient Medications   Medication Sig Dispense Refill     ACETAMINOPHEN (TYLENOL ORAL) Take 1,000 mg by mouth 2 (two) times a day.        amoxicillin (AMOXIL) 500 MG tablet Take prior to the Dentist       ascorbic acid (VITAMIN C) 1000 MG tablet Take 1,000 mg by mouth daily.       atorvastatin (LIPITOR) 40 MG tablet Take 40 mg by mouth at bedtime.       budesonide-formoterol (SYMBICORT) 160-4.5 mcg/actuation inhaler Inhale 2 puffs 2 (two) times a day.       co-enzyme Q-10 30 mg capsule Take 100 mg by mouth daily.       furosemide (LASIX) 20 MG tablet TAKE 1 TABLET(20 MG) BY MOUTH DAILY 90 tablet 3     losartan (COZAAR) 50 MG tablet TAKE 1 TABLET BY MOUTH EVERY DAY 90 tablet 2     MAG 64 64 mg TbEC delayed-release tablet TAKE 1 TABLET BY MOUTH TWICE DAILY 180 tablet 1     multivitamin (MULTIVITAMIN) per tablet Take 1 tablet by mouth daily.       omega 3-dha-epa-fish oil (FISH OIL) 1,000 mg (120 mg-180 mg) cap Take 2 tablets by mouth 2 (two) times a day.        polyethylene glycol (MIRALAX) 17 gram packet Take 17 g by mouth daily.       sotalol (BETAPACE) 80 MG tablet TAKE 2 TABLETS(160 MG) BY MOUTH TWICE DAILY. 360 tablet 0     tiotropium (SPIRIVA) 18 mcg inhalation capsule Place 18 mcg into inhaler and inhale daily.       vardenafil (LEVITRA) 10 MG tablet Take 10 mg  "by mouth daily as needed for erectile dysfunction.       VITAMIN D2 50,000 unit capsule Take 50,000 Units by mouth 2 (two) times a week. SUN and WED  3     warfarin (COUMADIN/JANTOVEN) 2.5 MG tablet Take 1-2 tablets (2.5-5 mg total) by mouth daily. Adjust dose per INR results as instructed. 110 tablet 1     albuterol (PROAIR HFA;PROVENTIL HFA;VENTOLIN HFA) 90 mcg/actuation inhaler Inhale 2 puffs every 6 (six) hours as needed for wheezing. 1 Inhaler 11     No current facility-administered medications for this visit.        Physical Exam:  /82   Pulse 60   Resp 12   Ht 6' 3\" (1.905 m)   Wt (!) 288 lb (130.6 kg)   SpO2 90%   BMI 36.00 kg/m    Gen: alert, oriented, no distress  HEENT: nasal turbinates are unremarkable, no oropharyngeal lesions, no cervical or supraclavicular lymphadenopathy  CV: RRR, no M/G/R  Resp: CTAB, no focal crackles or wheezes  Abd: soft, nontender, no palpable organomegaly  Skin: no apparent rashes  Ext: no cyanosis, clubbing or edema  Neuro: alert, nonfocal    Labs:  Reviewed  Dec 2018  Chem panel wnl  TSH wnl  hgb 12.1 Oct 2018    Previous testing  DONNA neg  blasto neg  Histo testing neg  c-ANCA neg  HIV neg  RF neg    Bronch/LLL BAL cultures neg      Imaging studies:  CT chest April 2018  IMPRESSION:   CONCLUSION:  1.  Mild scarring and slight bronchiectasis present at both lung bases. No active pneumonia identified.    CT chest 7/15/2019  IMPRESSION:   CONCLUSION:   1.  Nodular opacity of the left lower lobe of interest on 04/17/2019 is less conspicuous today and probably explained by scarring. Additional 6 month chest CT follow-up is recommended.  2.  Emphysema and scattered areas of scarring elsewhere in both lungs.  3.  Advanced multivessel coronary artery disease.      Left Ventricle: Normal left ventricular size.The estimated left ventricular ejection fraction is 45%.Mild concentric hypertrophy noted. Normal septal motion. Unable to assess left ventricular diastolic function " given patient is in atrial fibrillation.    Wall Scoring: Resting Even with Definity contrast, left ventricular wall motion is difficult to evaluate. Suspicious of inferior basilar hypokinesis on top of mild global hypokinesis.    Right Ventricle: Normal right ventricular size and systolic function. TAPSE is normal, which is consistent with normal right ventricular systolic function. Pacer lead is present in the ventricle.    Left Atrium: Left atrial volume is severely increased.    Tricuspid Valve: Tricuspid valve not well visualized. There is no evidence of tricuspid stenosis. Mild to moderate tricuspid valve regurgitation. The TR peak velocity is over estimated. Right ventricular systolic pressure is likely within normal limits.     Compared to echocardiogram from August 29, 2018, the findings are similar but both echoes are technically challenging.  There is more suspicion of more prominent inferior basilar hypokinesis on the current study.  The TR jet that evaluated the right ventricular systolic pressure, is contaminated and suspect relatively normal right ventricular systolic pressure estimate.    PFTs 2018  FEV1/FVC is 0.56 and is reduced.  FEV1 is 73% predicted and is reduced.  FVC is 96% predicted and normal.  There was no improvement in spirometry after a single inhaled dose of bronchodilator.  TLC is 99% predicted and is normal.  RV is 113% predicted and is normal.  DLCO is 93% predicted and is normal when it   is corrected for hemoglobin.     Impression:  Full Pulmonary Function Test is abnormal.  PFTs are consistent with mild obstructive disease.  Spirometry is not consistent with reversibility.  There is no hyperinflation.  There is no air-trapping.  Diffusion capacity when corrected for hemoglobin is normal.  Declines in both FEV1 and TLC since 2009 with overall preservation of diffusing capacity.    July 2019  SIX MINUTE WALK TEST / HOME O2 EVALUATION     SpO2 at rest on RA 96%  SpO2 started to drop  after 2 1/2 minutes walking. SpO2 after walking 2 1/2 minutes on RA was 88%  SpO2 after walking 6 minutes on RA was 87%  Distance covered 320.04 meters.  Recovery phase, SpO2 after 1 minute rest on RA was 87%  Recovery phase, SpO2 after 2 minute rest on RA was 97%     Impression:   Significant desaturation with activities.   Patient does qualify for O2 supplementation with activity.    Hugh Payton MD (Avi)  [facility]monary [facility]  Pager [phone]  Clinic [phone]

## 2021-06-05 NOTE — TELEPHONE ENCOUNTER
Received a faxed INR result for Buck Sinclair  From [facility]    INR result dated 2/4/2020 is 3.1

## 2021-06-05 NOTE — TELEPHONE ENCOUNTER
Who is calling:  Patients wife  Reason for Call:  Caller called requesting to speak with Rebekah.  Date of last appointment with primary care: n/a  Okay to leave a detailed message: Yes

## 2021-06-05 NOTE — TELEPHONE ENCOUNTER
ANTICOAGULATION  MANAGEMENT    Assessment     Today's INR result of 2.9 is Therapeutic (goal INR of 2.0-3.0)        Warfarin taken as previously instructed    started drinking V8 12 oz in the last week may be affecting INR - is a new routine for the patient about 2-3 drinks a week.    Had alcoholic drink on Sat may be affecting INR    Continues to tolerate warfarin with no reported s/s of bleeding or thromboembolism     Previous INR was Therapeutic    Plan:     Spoke with Neela regarding INR result and instructed:     Warfarin Dosing Instructions:  Continue current warfarin dose    5 mg every Mon, Wed, Fri; 2.5 mg all other days     (0 % change)    Instructed patient to follow up no later than: one week with home monitor.    Education provided: importance of consistent vitamin K intake, vitamin K content of foods, potential interaction between warfarin and alcohol, importance of therapeutic range, target INR goal and significance of current INR result and importance of following up for INR monitoring at instructed interval    Neela verbalizes understanding and agrees to warfarin dosing plan.    Instructed to call the [facility] for any changes, questions or concerns. (#[phone])   ?   Gisele Prince RN    Subjective/Objective:      Buck Sinclair, a 74 y.o. male is on warfarin.     Buck reports:     Home warfarin dose: verbally confirmed home dose with Neela and updated on anticoagulation calendar     Missed doses: No     Medication changes:  No     S/S of bleeding or thromboembolism:  No     New Injury or illness:  No     Changes in diet or alcohol consumption:  Yes: had alcoholic drink on Sat and started V8 ddrink 12 oz and had 2-3 drinks in the past week and per Neela the patient will continue this routine moving forward.     Upcoming surgery, procedure or cardioversion:  No    Anticoagulation Episode Summary     Current INR goal:   2.0-3.0   TTR:   74.2 % (1 y)   Next INR check:   1/21/2020   INR  from last check:   2.90 (1/14/2020)   Weekly max warfarin dose:      Target end date:      INR check location:      Preferred lab:      Send INR reminders to:   [facility]ART [facility]    Indications    Persistent Atrial Fibrillation [I48.91]           Comments:   home monitor talk to wife about dose Neela   [phone]            Anticoagulation Care Providers     Provider Role Specialty Phone number    Erica Hansen CNP  Cardiology [phone]    Everett Renee MD  Cardiology [phone]

## 2021-06-05 NOTE — TELEPHONE ENCOUNTER
ANTICOAGULATION  MANAGEMENT    Assessment     Today's INR result of 2.7 is Therapeutic (goal INR of 2.0-3.0)        Warfarin taken as previously instructed    Increased greens/vitamin K intake and continues to drink v8 may be affecting INR    No new medication/supplements affecting INR    Continues to tolerate warfarin with no reported s/s of bleeding or thromboembolism     Previous INR was Supratherapeutic most likely due to interaction between warfarin and alcohol    Plan:     Spoke with patient's spouse Neela regarding INR result and instructed:     Warfarin Dosing Instructions:  Continue current warfarin dose    5 mg every Mon, Wed, Fri; 2.5 mg all other days      (0 % change)    Instructed patient to follow up no later than: one week with home monitor.  Made aware that [facility] will not call if next INR's are within range.    Education provided: impact of vitamin K foods on INR, vitamin K content of foods, potential interaction between warfarin and alcohol, importance of therapeutic range and target INR goal and significance of current INR result    Neela verbalizes understanding and agrees to warfarin dosing plan.    Instructed to call the [facility] for any changes, questions or concerns. (#[phone])   ?   Gisele Prince RN    Subjective/Objective:      Buck JONES Sinclair, a 74 y.o. male is on warfarin.     Buck reports:     Home warfarin dose: verbally confirmed home dose with Neela and updated on anticoagulation calendar     Missed doses: No     Medication changes:  No     S/S of bleeding or thromboembolism:  No     New Injury or illness:  No     Changes in diet or alcohol consumption:  No     Upcoming surgery, procedure or cardioversion:  No    Anticoagulation Episode Summary     Current INR goal:   2.0-3.0   TTR:   74.5 % (1 y)   Next INR check:   2/4/2020   INR from last check:   2.70 (1/28/2020)   Weekly max warfarin dose:      Target end date:      INR check location:      Preferred lab:      Send  INR reminders to:   [facility] HEART CARE    Indications    Persistent Atrial Fibrillation [I48.91]           Comments:   home monitor talk to wife about dose [facility]   [phone]            Anticoagulation Care Providers     Provider Role Specialty Phone number    Erica Hansen CNP  Cardiology [phone]    Everett Renee MD  Cardiology [phone]

## 2021-06-05 NOTE — TELEPHONE ENCOUNTER
ANTICOAGULATION  MANAGEMENT    Assessment     Today's INR result of 3.2 is Supratherapeutic (goal INR of 2.0-3.0)        Warfarin taken as previously instructed    Change in alcohol intake may be affecting INR     Still drinking V8 but only had 2 this past week    No new medication/supplements affecting INR    Continues to tolerate warfarin with no reported s/s of bleeding or thromboembolism     Previous INR was Therapeutic    Plan:     Spoke with Neela regarding INR result and instructed:     Warfarin Dosing Instructions:  Continue current warfarin dose    5 mg every Mon, Wed, Fri; 2.5 mg all other days     (0 % change)  Instructed to eat extra greens this week to help bring INR down.    Instructed patient to follow up no later than: one week with home monitor    Education provided: importance of consistent vitamin K intake, impact of vitamin K foods on INR, vitamin K content of foods, potential interaction between warfarin and alcohol, importance of therapeutic range and target INR goal and significance of current INR result    Neela verbalizes understanding and agrees to warfarin dosing plan.    Instructed to call the [facility] for any changes, questions or concerns. (#[phone])   ?   Gisele Prince RN    Subjective/Objective:      Buck Sinclair, a 74 y.o. male is on warfarin.     Buck reports:     Home warfarin dose: verbally confirmed home dose with Neela and updated on anticoagulation calendar     Missed doses: No     Medication changes:  No     S/S of bleeding or thromboembolism:  No     New Injury or illness:  No     Changes in diet or alcohol consumption:  Yes: had more beer this past week than usual     Upcoming surgery, procedure or cardioversion:  No    Anticoagulation Episode Summary     Current INR goal:   2.0-3.0   TTR:   73.4 % (1 y)   Next INR check:   1/28/2020   INR from last check:   3.20! (1/21/2020)   Weekly max warfarin dose:      Target end date:      INR check location:       Preferred lab:      Send INR reminders to:   [facility]ART [facility]    Indications    Persistent Atrial Fibrillation [I48.91]           Comments:   home monitor talk to wife about dose [facility]   [phone]            Anticoagulation Care Providers     Provider Role Specialty Phone number    Erica Hansen CNP  Cardiology [phone]    Everett Renee MD  Cardiology [phone]

## 2021-06-06 NOTE — TELEPHONE ENCOUNTER
Received a faxed INR result for Buck Sinclair  [facility]    INR result dated 3/17/2020 is 2.6

## 2021-06-06 NOTE — TELEPHONE ENCOUNTER
ANTICOAGULATION  MANAGEMENT-Patient Home Monitoring Result    Assessment     Therapeutic INR result of 2.5 (goal INR of 2.0-3.0) received via fax from [employer] home INR monitoring company.        Previous INR was Therapeutic    Buck Sinclair last contacted by phone: 2/11/2010    Plan     Per home monitoring agreement with patient, patient was NOT contacted regarding therapeutic result today.  Patient is to continue current dose and continue to check INR with home monitor per protocol.  ?   Gisele Prince RN    Subjective/Objective:      Buck Sinclair, a 75 y.o. male  is established on warfarin using a home INR monitor.    Anticoagulation Episode Summary     Current INR goal:   2.0-3.0   TTR:   78.5 % (1 y)   Next INR check:   3/10/2020   INR from last check:   2.50 (2/25/2020)   Weekly max warfarin dose:      Target end date:      INR check location:      Preferred lab:      Send INR reminders to:   [facility]ARE    Indications    Persistent Atrial Fibrillation [I48.91]           Comments:   home monitor talk to wife about dose [facility]   [phone]            Anticoagulation Care Providers     Provider Role Specialty Phone number    Erica Hansen CNP  Cardiology [phone]    Everett Renee MD  Cardiology [phone]

## 2021-06-06 NOTE — TELEPHONE ENCOUNTER
ANTICOAGULATION  MANAGEMENT    Assessment     Today's INR result of 2.4 is Therapeutic (goal INR of 2.0-3.0)        Warfarin taken as previously instructed    No new diet changes affecting INR    No new medication/supplements affecting INR    Continues to tolerate warfarin with no reported s/s of bleeding or thromboembolism     Previous INR was Supratherapeutic    Plan:     Spoke with patient's spouse Neela regarding INR result and instructed:     Warfarin Dosing Instructions:  Continue current warfarin dose    5 mg every Mon, Fri; 2.5 mg all other days     (0 % change)    Instructed patient to follow up no later than: 1-2 weeks with home monitor    Education provided: importance of therapeutic range and target INR goal and significance of current INR result    Neela verbalizes understanding and agrees to warfarin dosing plan.    Instructed to call the [facility] for any changes, questions or concerns. (#[phone])   ?   Gisele Prince RN    Subjective/Objective:      Buck Sinclair, a 74 y.o. male is on warfarin.     Buck reports:     Home warfarin dose: verbally confirmed home dose with Neela and updated on anticoagulation calendar     Missed doses: No     Medication changes:  No     S/S of bleeding or thromboembolism:  No     New Injury or illness:  No     Changes in diet or alcohol consumption:  No     Upcoming surgery, procedure or cardioversion:  No    Anticoagulation Episode Summary     Current INR goal:   2.0-3.0   TTR:   75.3 % (1 y)   Next INR check:   2/25/2020   INR from last check:   2.40 (2/11/2020)   Weekly max warfarin dose:      Target end date:      INR check location:      Preferred lab:      Send INR reminders to:   [facility] HEART [facility]    Indications    Persistent Atrial Fibrillation [I48.91]           Comments:   home monitor talk to wife about dose Neela   [phone]            Anticoagulation Care Providers     Provider Role Specialty Phone number    Erica Hansen  A., CNP  Cardiology [phone]    Everett Renee MD  Cardiology [phone]

## 2021-06-06 NOTE — TELEPHONE ENCOUNTER
ANTICOAGULATION  MANAGEMENT-Patient Home Monitoring Result    Assessment     Therapeutic INR result of 2.9 (goal INR of 2.0-3.0) received via fax from [employer] home INR monitoring company.        Previous INR was Therapeutic    Buck Sinclair last contacted by phone: 2/11    Plan     Per home monitoring agreement with patient, patient was NOT contacted regarding therapeutic result today.  Patient is to continue current dose and continue to check INR with home monitor per protocol.  ?   Suzanna Corea RN    Subjective/Objective:      Buck Sinclair, a 75 y.o. male  is established on warfarin using a home INR monitor.    Anticoagulation Episode Summary     Current INR goal:   2.0-3.0   TTR:   80.1 % (1 y)   Next INR check:   3/17/2020   INR from last check:   2.90 (3/10/2020)   Weekly max warfarin dose:      Target end date:      INR check location:      Preferred lab:      Send INR reminders to:   [facility] HEART CARE    Indications    Persistent Atrial Fibrillation [I48.91]           Comments:   home monitor talk to wife about dose [facility]   [phone]            Anticoagulation Care Providers     Provider Role Specialty Phone number    Erica Hansen CNP  Cardiology [phone]    Everett Renee MD  Cardiology [phone]

## 2021-06-06 NOTE — TELEPHONE ENCOUNTER
Received a faxed INR result for Buck Sinclair  From [facility]    INR result dated 2/25/2020 is 2.5

## 2021-06-06 NOTE — TELEPHONE ENCOUNTER
Received a faxed INR result for Buck Sinclair  From [facility]    INR result dated 2/11/2020 is 2.4

## 2021-06-06 NOTE — TELEPHONE ENCOUNTER
RN cannot approve Refill Request    RN can NOT refill this medication med is not covered by policy/route to provider. Last office visit: Visit date not found Last Physical: Visit date not found Last MTM visit: Visit date not found Last visit same specialty: 11/30/2017 Jenny Peter PA-C.  Next visit within 3 mo: Visit date not found  Next physical within 3 mo: Visit date not found      Doreen Ness, Care Connection Triage/Med Refill 2/25/2020    Requested Prescriptions   Pending Prescriptions Disp Refills     sotaloL (BETAPACE) 80 MG tablet [Pharmacy Med Name: SOTALOL 80MG TABLETS] 360 tablet 0     Sig: TAKE 2 TABLETS(160 MG) BY MOUTH TWICE DAILY.       There is no refill protocol information for this order

## 2021-06-06 NOTE — TELEPHONE ENCOUNTER
ANTICOAGULATION  MANAGEMENT-Patient Home Monitoring Result    Assessment     Therapeutic INR result of 2.7 (goal INR of 2.0-3.0) received via fax from [employer] home INR monitoring company.        Previous INR was Therapeutic    Buck Sinclair last contacted by phone: 2/11/2020    Plan     Per home monitoring agreement with patient, patient was NOT contacted regarding therapeutic result today.  Patient is to continue current dose and continue to check INR with home monitor per protocol.  ?   Gisele Prince RN    Subjective/Objective:      Buck Sinclair, a 75 y.o. male  is established on warfarin using a home INR monitor.    Anticoagulation Episode Summary     Current INR goal:   2.0-3.0   TTR:   79.2 % (1 y)   Next INR check:   3/10/2020   INR from last check:   2.70 (3/3/2020)   Weekly max warfarin dose:      Target end date:      INR check location:      Preferred lab:      Send INR reminders to:   [facility] HEART CARE    Indications    Persistent Atrial Fibrillation [I48.91]           Comments:   home monitor talk to wife about dose [facility]   [phone]            Anticoagulation Care Providers     Provider Role Specialty Phone number    Erica Hansen CNP  Cardiology [phone]    Everett Renee MD  Cardiology [phone]

## 2021-06-06 NOTE — TELEPHONE ENCOUNTER
RN cannot approve Refill Request    RN can NOT refill this medication med is not covered by policy/route to provider. Last office visit: Visit date not found Last Physical: Visit date not found Last MTM visit: Visit date not found Last visit same specialty: 11/30/2017 Jenny Peter PA-C.  Next visit within 3 mo: Visit date not found  Next physical within 3 mo: Visit date not found      David Russell, [facility] Connection Triage/Med Refill 2/21/2020    Requested Prescriptions   Pending Prescriptions Disp Refills     sotaloL (BETAPACE) 80 MG tablet [Pharmacy Med Name: SOTALOL 80MG TABLETS] 360 tablet 0     Sig: TAKE 2 TABLETS(160 MG) BY MOUTH TWICE DAILY.       There is no refill protocol information for this order

## 2021-06-06 NOTE — TELEPHONE ENCOUNTER
ANTICOAGULATION  MANAGEMENT-Patient Home Monitoring Result    Assessment     Therapeutic INR result of 2.6 (goal INR of 2.0-3.0) received via fax from [employer]   home INR monitoring company.        Previous INR was Therapeutic    Buck Sinclair last contacted by phone: 2/11/2020    Plan     Per home monitoring agreement with patient, patient was NOT contacted regarding therapeutic result today.  Patient is to continue current dose and continue to check INR with home monitor per protocol.  ?   Gisele Prince RN    Subjective/Objective:      Buck Sinclair, a 75 y.o. male  is established on warfarin using a home INR monitor.    Anticoagulation Episode Summary     Current INR goal:   2.0-3.0   TTR:   80.1 % (1 y)   Next INR check:   3/24/2020   INR from last check:   2.60 (3/17/2020)   Weekly max warfarin dose:      Target end date:      INR check location:      Preferred lab:      Send INR reminders to:   [facility] HEART CARE    Indications    Persistent Atrial Fibrillation [I48.91]           Comments:   home monitor talk to wife about dose [facility]   [phone]            Anticoagulation Care Providers     Provider Role Specialty Phone number    Erica Hansen CNP  Cardiology [phone]    Everett Renee MD  Cardiology [phone]

## 2021-06-06 NOTE — TELEPHONE ENCOUNTER
ANTICOAGULATION  MANAGEMENT-Patient Home Monitoring Result    Assessment     Therapeutic INR result of 2.2 (goal INR of 2.0-3.0) received via fax from [employer]   home INR monitoring company.        Previous INR was Therapeutic    Buck Sinclair last contacted by phone: 2/11/2020    Plan     Per home monitoring agreement with patient, patient was NOT contacted regarding therapeutic result today.  Patient is to continue current dose and continue to check INR with home monitor per protocol.  ?   Gisele Prince RN    Subjective/Objective:      Buck Sinclair, a 75 y.o. male  is established on warfarin using a home INR monitor.    Anticoagulation Episode Summary     Current INR goal:   2.0-3.0   TTR:   77.0 % (1 y)   Next INR check:   3/3/2020   INR from last check:   2.20 (2/18/2020)   Weekly max warfarin dose:      Target end date:      INR check location:      Preferred lab:      Send INR reminders to:   [facility] HEART CARE    Indications    Persistent Atrial Fibrillation [I48.91]           Comments:   home monitor talk to wife about dose [facility]   [phone]            Anticoagulation Care Providers     Provider Role Specialty Phone number    Erica Hansen CNP  Cardiology [phone]    Everett Renee MD  Cardiology [phone]

## 2021-06-07 NOTE — PROGRESS NOTES
"Buck Sinclair is a 75 y.o. male who is being evaluated via a billable telephone visit.      The patient has been notified of following:     \"This telephone visit will be conducted via a call between you and your physician/provider. We have found that certain health care needs can be provided without the need for a physical exam.  This service lets us provide the care you need with a short phone conversation.  If a prescription is necessary we can send it directly to your pharmacy.  If lab work is needed we can place an order for that and you can then stop by our lab to have the test done at a later time.    Telephone visits are billed at different rates depending on your insurance coverage. During this emergency period, for some insurers they may be billed the same as an in-person visit.  Please reach out to your insurance provider with any questions.    If during the course of the call the physician/provider feels a telephone visit is not appropriate, you will not be charged for this service.\"    Patient has given verbal consent to a Telephone visit? Yes    Patient would like to receive their AVS by AVS Preference: Mail a copy.    Additional provider notes:  I last saw Buck on 1/16/2020. Since that time, he reports his shortness of breath is still present but stable.   He did discuss possibility of RHC with cardiology and it was thought not to pursue this.  Pacemaker was noted to be set at 50bpm, plan to increase to 60bpm once he is allowed back into the clinic. Holding off on RHC for now per Buck.   Still on sotalol.   No kirstin hemoptysis, occasional wheezing.  Taking inhalers faithfully. Using rescue inhaler 1-2 times per day as part of his routine, usually before exercising on his stationary bike.  Labs reviewed, slight bump in Scr 1.1 -> 1.26 on 2/4. His lasix is being managed by cardiology.   Remainder of labs unremarkable/unchanged.     Assessment/Plan:  72 year old male with a history of tobacco " dependence in remission, CAD s/p PCI/stents, afib s/p PVL with ICD/PPM on anticoagulation, history of cavitary lesion and extensive pneumonia in LLL in Oct 2017, subsequent recurrent LLL pneumonia, since resolved, presenting for follow up. He appears to be stable from a respiratory standpoint without any further hemoptysis. Dyspnea on exertion is stable but does seem to limit him significantly.     Plan:  #COPD, GOLD class B (CAT >10, few exacerbations/low risk for hospitalization). Minimal smoking history so this is probably due to his work as a . PFTs 2017 showed mild obstruction and normal DLco. Mild exertional hypoxemia noted on 6MWT in 2019. He had previously declined oxygen therapy when I asked him about this.  - continue budesonide-formoterol 160-4.5 mcg two inhalations BID with spacer; rinse/gargle/spit water after use. No dose changes today at is is still cold out and winter weather significantly affects his breathing.  - continue tiotropium HandiHaler one inhalation daily  - Cont albuterol as needed  - encouraged exercise, weight loss as able  - as noted previously he declined supplementa O2.   - Re: LIMA: suspicion for PVD/pulm HTN since his LIMA seems out of proportion to PFT abnormalities from 2018. Possible chronotropic incompetence due to low HR setting on his PPM. Last echo 2019 did not show markedly abnormal PA pressures but was a limited study. He did have borderline reduced EF of 45%. This cannot be reprogrammed remotely per Buck. Dr. Renee is planning to increase the bpm when he sees him next in clinic. If still having significant symptoms after this, will readdress utility of repeat TTE and/or RHC with cardiology.  - UTD w/ pneumonia and flu vaccines. Rec flu shot every Fall.  - plan for follow up in 6 months with repeat PFTs to reassess lung function and DLco.      #LLL nodule: decreasing in size on 3 month f/u scan after abx. 9mm -> 7mm and associated with scarring  - no further  imaging needed unless recurrent symptoms     #Hemoptysis: no further recurrence. Likely was due to the prior LLL pneumonia in the setting of anticoagulation.  - continue INR goal close to 2, as discussed with Dr. Renee   - he'll let me know if he has any concerning symptoms.     Follow up in 6 months with PFTs.    Phone call duration: 14 minutes    Hugh Payton MD (Avi)  [facility] Pulmonary & Critical Care  Pager [phone]  Clinic [phone]

## 2021-06-07 NOTE — TELEPHONE ENCOUNTER
Received a faxed INR result for Buck Sinclair  From [facility]    INR result dated 4/28/2020 is 2.1

## 2021-06-07 NOTE — TELEPHONE ENCOUNTER
ANTICOAGULATION  MANAGEMENT-Patient Home Monitoring Result    Assessment     Therapeutic INR result of 2.6 (goal INR of 2.0-3.0) received via fax from [employer] home INR monitoring company.        Previous INR was Therapeutic    Buck Sinclair last contacted by phone: 2/11/2020    Plan     Per home monitoring agreement with patient, patient was NOT contacted regarding therapeutic result today.  Patient is to continue current dose and continue to check INR with home monitor per protocol.  ?   Gisele Prince RN    Subjective/Objective:      Buck Sinclair, a 75 y.o. male  is established on warfarin using a home INR monitor.    Anticoagulation Episode Summary     Current INR goal:   2.0-3.0   TTR:   80.1 % (1 y)   Next INR check:   4/7/2020   INR from last check:   2.60 (3/31/2020)   Weekly max warfarin dose:      Target end date:      INR check location:      Preferred lab:      Send INR reminders to:   [facility] HEART CARE    Indications    Persistent Atrial Fibrillation [I48.91]           Comments:   home monitor talk to wife about dose [facility]   [phone]            Anticoagulation Care Providers     Provider Role Specialty Phone number    Erica Hansen CNP  Cardiology [phone]    Everett Renee MD  Cardiology [phone]

## 2021-06-07 NOTE — PROGRESS NOTES
Review of Systems - History obtained from the patient  General ROS: negative  Psychological ROS: negative  Ophthalmic ROS: negative  ENT ROS: negative  Hematological and Lymphatic ROS: positive for - easy bleeding and easy bruising  Respiratory ROS: positive for - wheezing  Cardiovascular ROS: positive for - shortness of breath with activity  Gastrointestinal ROS: negative  Genito-Urinary ROS: negative  Musculoskeletal ROS: negative  Neurological ROS: positive for - daytime sleepiness  Dermatological ROS: negative

## 2021-06-07 NOTE — TELEPHONE ENCOUNTER
ANTICOAGULATION  MANAGEMENT-Patient Home Monitoring Result    Assessment     Therapeutic INR result of 2.2 (goal INR of 2.0-3.0) received via fax from [employer] home INR monitoring company.        Previous INR was Therapeutic    Buck Sinclair last contacted by phone: 2/11/2020    Plan     Per home monitoring agreement with patient, patient was NOT contacted regarding therapeutic result today.  Patient is to continue current dose and continue to check INR with home monitor per protocol.  ?   Gisele Prince RN    Subjective/Objective:      Buck Sinclair, a 75 y.o. male  is established on warfarin using a home INR monitor.    Anticoagulation Episode Summary     Current INR goal:   2.0-3.0   TTR:   81.8 % (1 y)   Next INR check:   4/21/2020   INR from last check:   2.20 (4/14/2020)   Weekly max warfarin dose:      Target end date:      INR check location:      Preferred lab:      Send INR reminders to:   [facility] HEART CARE    Indications    Persistent Atrial Fibrillation [I48.91]           Comments:   home monitor talk to wife about dose [facility]   [phone]            Anticoagulation Care Providers     Provider Role Specialty Phone number    Erica Hansen CNP  Cardiology [phone]    Everett Renee MD  Cardiology [phone]

## 2021-06-07 NOTE — TELEPHONE ENCOUNTER
Received a faxed INR result for Buck Sinclair  From [employer] [facility]    INR result dated 4/21/2020 is 2.5

## 2021-06-07 NOTE — PROGRESS NOTES
Buck called and said he is now needing his oxygen  O2 sat at rest and on room air per his Oximeter  Was 88 % . Will send orders to [facility] per Dr. Payton,

## 2021-06-07 NOTE — TELEPHONE ENCOUNTER
ANTICOAGULATION  MANAGEMENT-Patient Home Monitoring Result    Assessment     Therapeutic INR result of 2.5 (goal INR of 2.0-3.0) received via fax from [employer] home INR monitoring company.        Previous INR was Therapeutic    Buck Sinclair last contacted by phone: 2/11/2020    Plan     Per home monitoring agreement with patient, patient was NOT contacted regarding therapeutic result today.  Patient is to continue current dose and continue to check INR with home monitor per protocol.  ?   Gisele Prince RN    Subjective/Objective:      Buck Sinclair, a 75 y.o. male  is established on warfarin using a home INR monitor.    Anticoagulation Episode Summary     Current INR goal:   2.0-3.0   TTR:   83.7 % (1 y)   Next INR check:   5/5/2020   INR from last check:   2.50 (4/21/2020)   Weekly max warfarin dose:      Target end date:      INR check location:      Preferred lab:      Send INR reminders to:   [facility]ART CARE    Indications    Persistent Atrial Fibrillation [I48.91]           Comments:   home monitor talk to wife about dose [facility]   [phone]            Anticoagulation Care Providers     Provider Role Specialty Phone number    Erica Hansen CNP  Cardiology [phone]    Everett Renee MD  Cardiology [phone]

## 2021-06-07 NOTE — PROGRESS NOTES
Currently DDD=60 notes a slow HR  Plan  DDDR ; but will this affect AV conduction   Reprogram when allowed

## 2021-06-07 NOTE — TELEPHONE ENCOUNTER
Buck called to request oxygen be ordered for him to use at home. Said he qualified for it when he had his last appointment but was not ready for it at that time. Said he really is in need of it now. Home O2 sat at rest and on room air = 88 % . Will send order to [facility] for a POC and to get O2 started at two liters per NC continuously. Orders faxed to [employer].

## 2021-06-07 NOTE — TELEPHONE ENCOUNTER
Received a faxed INR result for Buck Sinclair  From [facility]    INR result dated 3/31/2020 is 2.6

## 2021-06-07 NOTE — TELEPHONE ENCOUNTER
Received a faxed INR result for Buck Sinclair  From [facility]    INR result dated 5/5/2020 is 2.4

## 2021-06-07 NOTE — TELEPHONE ENCOUNTER
ANTICOAGULATION  MANAGEMENT-Patient Home Monitoring Result    Assessment     Therapeutic INR result of 2.1 (goal INR of 2.0-3.0) received via fax from [employer] home INR monitoring company.        Previous INR was Therapeutic    Buck Sinclair last contacted by phone: 2/11/2020    Plan     Per home monitoring agreement with patient, patient was NOT contacted regarding therapeutic result today.  Patient is to continue current dose and continue to check INR with home monitor per protocol.  ?   Gisele Prince RN    Subjective/Objective:      Buck Sinclair, a 75 y.o. male  is established on warfarin using a home INR monitor.    Anticoagulation Episode Summary     Current INR goal:   2.0-3.0   TTR:   84.4 % (1 y)   Next INR check:   5/5/2020   INR from last check:   2.10 (4/28/2020)   Weekly max warfarin dose:      Target end date:      INR check location:      Preferred lab:      Send INR reminders to:   [facility] HEART CARE    Indications    Persistent Atrial Fibrillation [I48.91]           Comments:   home monitor talk to wife about dose [facility]   [phone]            Anticoagulation Care Providers     Provider Role Specialty Phone number    Erica Hansen CNP  Cardiology [phone]    Everett Renee MD  Cardiology [phone]

## 2021-06-07 NOTE — PATIENT INSTRUCTIONS - HE
Buck Sinclair    Thank you for coming to the [facility] today for a cardiac evaluation  It was my pleasure to see you today  A good contact for any questions would be Val Angeles  RN @ [phone]    Plan:  1. HR rather low at times; device programmed to pace at 50 BPM  Will reprogram to DDDR  to allow higher  Heart rates  Watch O2 sats closely; if < 90% would consider home oxygen  Laboratory studies 2- were normal for electrolytes and renal  INR well controlled; warfarin therapy  Continue current medication   Follow up in 3 months

## 2021-06-07 NOTE — TELEPHONE ENCOUNTER
ANTICOAGULATION  MANAGEMENT-Patient Home Monitoring Result    Assessment     Therapeutic INR result of 2.8 (goal INR of 2.0-3.0) received via fax from [email or web address] home INR monitoring company.        Previous INR was Therapeutic    Buck Sinclair last contacted by phone: 2/11/2020    Plan     Per home monitoring agreement with patient, patient was NOT contacted regarding therapeutic result today.  Patient is to continue current dose and continue to check INR with home monitor per protocol.  ?   Gisele Prince RN    Subjective/Objective:      Buck Sinclair, a 75 y.o. male  is established on warfarin using a home INR monitor.    Anticoagulation Episode Summary     Current INR goal:   2.0-3.0   TTR:   80.1 % (1 y)   Next INR check:   4/14/2020   INR from last check:   2.80 (4/7/2020)   Weekly max warfarin dose:      Target end date:      INR check location:      Preferred lab:      Send INR reminders to:   [facility] HEART CARE    Indications    Persistent Atrial Fibrillation [I48.91]           Comments:   home monitor talk to wife about dose [facility]   [phone]            Anticoagulation Care Providers     Provider Role Specialty Phone number    Erica Hansen CNP  Cardiology [phone]    Everett Renee MD  Cardiology [phone]

## 2021-06-07 NOTE — TELEPHONE ENCOUNTER
Received a faxed INR result for Buck Sinclair  From [facility]  INR result dated 4/14/2020 is 2.2

## 2021-06-07 NOTE — TELEPHONE ENCOUNTER
Received a faxed INR result for Buck Sinclair  From [employer] [facility]    INR result dated 3/24/2020 is 2.9

## 2021-06-07 NOTE — TELEPHONE ENCOUNTER
ANTICOAGULATION  MANAGEMENT-Patient Home Monitoring Result    Assessment     Therapeutic INR result of 2.9 (goal INR of 2.0-3.0) received via fax from [employer] home INR monitoring company.        Previous INR was Therapeutic    Buck Sinclair last contacted by phone: 2/11/2020    Plan     Per home monitoring agreement with patient, patient was NOT contacted regarding therapeutic result today.  Patient is to continue current dose and continue to check INR with home monitor per protocol.  ?   Gisele Prince RN    Subjective/Objective:      Bukc Sinclair, a 75 y.o. male  is established on warfarin using a home INR monitor.    Anticoagulation Episode Summary     Current INR goal:   2.0-3.0   TTR:   80.1 % (1 y)   Next INR check:   3/31/2020   INR from last check:   2.90 (3/24/2020)   Weekly max warfarin dose:      Target end date:      INR check location:      Preferred lab:      Send INR reminders to:   [facility] HEART CARE    Indications    Persistent Atrial Fibrillation [I48.91]           Comments:   home monitor talk to wife about dose [facility]   [phone]            Anticoagulation Care Providers     Provider Role Specialty Phone number    Erica Hansen CNP  Cardiology [phone]    Everett Renee MD  Cardiology [phone]

## 2021-06-07 NOTE — PROGRESS NOTES
Buck prefers to wait until things calm down. He has COPD and doesn't want to risk leaving the house and getting sick. Plan to reprogram at next office visit. I also asked him to call us and schedule for the reprogramming when he is ready and once things settle down with COVID-19. He agreed to this plan.    Flakita Mcrae RN BSN  [facility]

## 2021-06-08 ENCOUNTER — COMMUNICATION - HEALTHEAST (OUTPATIENT)
Dept: ANTICOAGULATION | Facility: CLINIC | Age: 76
End: 2021-06-08

## 2021-06-08 ENCOUNTER — TRANSFERRED RECORDS (OUTPATIENT)
Dept: HEALTH INFORMATION MANAGEMENT | Facility: CLINIC | Age: 76
End: 2021-06-08

## 2021-06-08 DIAGNOSIS — I48.91 ATRIAL FIBRILLATION (H): ICD-10-CM

## 2021-06-08 LAB — INR PPP: 1.8 (ref 0.9–1.1)

## 2021-06-08 NOTE — TELEPHONE ENCOUNTER
ANTICOAGULATION  MANAGEMENT-Patient Home Monitoring Result    Assessment     Therapeutic INR result of 2.4 (goal INR of 2.0-3.0) received via fax from [employer] home INR monitoring company.        Previous INR was Therapeutic    Buck Sinclair last contacted by phone: 5/5/2020    Plan     Per home monitoring agreement with patient, patient was NOT contacted regarding therapeutic result today.  Patient is to continue current dose and continue to check INR with home monitor per protocol.  ?   Gisele Prince RN    Subjective/Objective:      Buck Sinclair, a 75 y.o. male  is established on warfarin using a home INR monitor.    Anticoagulation Episode Summary     Current INR goal:   2.0-3.0   TTR:   90.7 % (1 y)   Next INR check:   6/23/2020   INR from last check:   2.40 (6/16/2020)   Weekly max warfarin dose:      Target end date:      INR check location:      Preferred lab:      Send INR reminders to:   [facility] HEART CARE    Indications    Persistent Atrial Fibrillation [I48.91]           Comments:   home monitor talk to wife about dose [facility]   [phone]            Anticoagulation Care Providers     Provider Role Specialty Phone number    Erica Hansen CNP  Cardiology [phone]    Everett Renee MD  Cardiology [phone]

## 2021-06-08 NOTE — TELEPHONE ENCOUNTER
Received a faxed INR result for Buck Sinclair  From [facility]    INR result dated 6/2/2020 is 2.4

## 2021-06-08 NOTE — TELEPHONE ENCOUNTER
ANTICOAGULATION  MANAGEMENT-Patient Home Monitoring Result    Assessment     Therapeutic INR result of 2.3 (goal INR of 2.0-3.0) received via fax from [employer] home INR monitoring company.        Previous INR was Therapeutic    Buck Sinclair last contacted by phone: 5/5/2020    Plan     Per home monitoring agreement with patient, patient was NOT contacted regarding therapeutic result today.  Patient is to continue current dose and continue to check INR with home monitor per protocol.  ?   Gisele Prince RN    Subjective/Objective:      Buck Sinclair, a 75 y.o. male  is established on warfarin using a home INR monitor.    Anticoagulation Episode Summary     Current INR goal:   2.0-3.0   TTR:   88.6 % (1 y)   Next INR check:   5/26/2020   INR from last check:   2.30 (5/19/2020)   Weekly max warfarin dose:      Target end date:      INR check location:      Preferred lab:      Send INR reminders to:   [facility] HEART CARE    Indications    Persistent Atrial Fibrillation [I48.91]           Comments:   home monitor talk to wife about dose [facility]   [phone]            Anticoagulation Care Providers     Provider Role Specialty Phone number    Erica Hansen CNP  Cardiology [phone]    Everett Renee MD  Cardiology [phone]

## 2021-06-08 NOTE — TELEPHONE ENCOUNTER
ANTICOAGULATION  MANAGEMENT-Patient Home Monitoring Result    Assessment     Therapeutic INR result of 2.4 (goal INR of 2.0-3.0) received via fax from [employer] home INR monitoring company.        Previous INR was Therapeutic    Buck Sinclair last contacted by phone: 5/5/2020    Plan     Per home monitoring agreement with patient, patient was NOT contacted regarding therapeutic result today.  Patient is to continue current dose and continue to check INR with home monitor per protocol.  ?   Gisele Prince RN    Subjective/Objective:      Buck Sinclair, a 75 y.o. male  is established on warfarin using a home INR monitor.    Anticoagulation Episode Summary     Current INR goal:   2.0-3.0   TTR:   86.7 % (1 y)   Next INR check:   5/19/2020   INR from last check:   2.40 (5/12/2020)   Weekly max warfarin dose:      Target end date:      INR check location:      Preferred lab:      Send INR reminders to:   [facility] HEART CARE    Indications    Persistent Atrial Fibrillation [I48.91]           Comments:   home monitor talk [facility]   [phone]            Anticoagulation Care Providers     Provider Role Specialty Phone number    Erica Hansen CNP  Cardiology [phone]    Everett Renee MD  Cardiology [phone]

## 2021-06-08 NOTE — TELEPHONE ENCOUNTER
Received a faxed INR result for Buck Sinclair  From [facility]  INR result dated 6/9/2020 is 2.5

## 2021-06-08 NOTE — PROGRESS NOTES
[facility] Heart Care Note    Assessment:  Atrial atrial fibrillation dating back to 1997 with multiple external cardioversions   Chronic amiodarone was partially effective;Continued for many years ; stopped because of photosensitive and other adverse effects   Pulmonary vein isolation done on 2 occasions 2011   Post PVI Cardoversion February 12/ 2015 ; CV 10-         Cardioversion  11-  Current antiarrhythmic; sotalol 120 milligrams twice a day   Long-standing advanced dilated cardiomyopathy dating back to 1997-now resolved        Recent stress nuclear scan; April 30 2014 showed ejection fraction 47% without ischemia        Echocardiogram 21 September 2015 showed ejection fraction equals 50%   Sinus node dysfunction   Medtronic acemaker initially implanted May 1999-dual atrial leads;         generator replacement May 2005          Removal of atrial and ventricular pacing leads with placement of ICD system 6 1 2014 and Medtronic single coil   Chronic anticoagulation with warfarin ; he had excessive skin bleeding from Eliquis   Obesity-substantial weight reduction on conscientious diet   COPD felt be related to previous  exposure   Negative obstructive sleep apnea evaluation   Hypertension  Hyperkalemia  Hyperlipidemia well controlled on statin; LDL equals 81    Plan:  Because of elevated potassium equals 5.5, and trend of optivol suggesting lung congestion, I would recommend you increased her furosemide/Lasix to 20 milligrams daily-prescription was called to your pharmacy  He is now come out of atrial fibrillation spontaneously  Continue current medications  Follow-up with Lauren Foster NP in 1-2 months/ AF clinic   We talked about eventually accepting  atrial fibrillation and pursuing rate control strategy  Check BMP in 2 weeks; mainly to assess K level      Subjective:    I had the opportunity to see.Buck Sinclair , who is a 71 y.o. male with a known history of atrial fibrillation  issues  He had a cardioversion November 18 2016  Although it was successful, he went back in atrial fibrillation sixth and was in atrial fibrillation for about 3 and a half days during that time his ventricular rate was controlled and he was asymptomatic  He then had spontaneous conversion of his atrial fibrillation  Is not apparent that he is symptomatic with atrial fibrillation  He has not been very active, the weather is working against him, he has been having shoulder and back problems and has just not been active  He has had no angina  He denies excessive breathlessness orthopnea PND or pedal edema  He was found to have elevated potassium equals 5.5 and was advised to avoid foods rich in potassium  When I reviewed his medicines, losartan could be implicated, he has had normal renal function  He currently takes furosemide 20 milligrams every other day    Problem List:  Patient Active Problem List   Diagnosis     Dyslipidemia     Essential hypertension     Persistent Atrial Fibrillation     Cardiomyopathy, idiopathic     History of sick sinus syndrome     Medical History:  Past Medical History   Diagnosis Date     Anemia      BPH (benign prostatic hyperplasia)      COPD (chronic obstructive pulmonary disease)      Dyslipidemia, goal LDL below 100      Essential hypertension      Persistent atrial fibrillation      Skin cancer of trunk      Ventricular tachycardia      Surgical History:  Past Surgical History   Procedure Laterality Date     Pr wise w/o facetec foramot/dskc 1/2 vrt seg, cervical       Laminectomy Lumbar;  Recorded: 03/09/2012;     Pr ablate heart dysrhythm focus  04/2011     Catheter Ablation Atrial Fibrillation PVI Apr 2011 (Cryo+RF-PVI + roof line + CTI line)     Pr ablate heart dysrhythm focus  07/2011     Re-do PVI Jul 2011 (RFA-PVI + CFE + VIDYA + confirmation of CTI line)     Fracture surgery Left      wrist     Cardiac defibrillator placement       Total shoulder replacement Right 03/03/2016      Dr. Abernathy of [facility]     Cardioversion       x20, last 2/12/15 and 10/2015 by Lauren Foster CNP     Inguinal hernia repair Left 1967     while in the [employer] in [place] after 13 month in [place]     Social History:  Social History     Social History     Marital status:      Spouse name: Neela     Number of children: N/A     Years of education: 12     Occupational History      Retired      police      [employer]     Social History Main Topics     Smoking status: Former Smoker     Types: Cigarettes     Quit date: 1/1/1968     Smokeless tobacco: Not on file     Alcohol use 1.2 oz/week     2 Cans of beer per week      Comment: 1 beer per week     Drug use: No     Sexual activity: Yes     Partners: Female     Other Topics Concern     Not on file     Social History Narrative       Review of Systems:      General: WNL  Eyes: WNL  Ears/Nose/Throat: WNL  Lungs: WNL  Heart: WNL  Stomach: WNL  Bladder: WNL  Muscle/Joints: WNL  Skin: WNL  Nervous System: WNL  Mental Health: WNL     Blood: WNL        Family History:  Family History   Problem Relation Age of Onset     Cancer Mother      leukemia     Cancer Father      bladder     Cancer Sister          Allergies:  Allergies   Allergen Reactions     Adhesive Other (See Comments)     ADHESIVE TAPE; SKIN IRRITATION     Amiodarone      ADVERSE REACTION.  Sunlight sensitivity.     Lisinopril Cough       Medications:  Current Outpatient Prescriptions   Medication Sig Dispense Refill     ACETAMINOPHEN (TYLENOL ORAL) Take 2 tablets by mouth 2 (two) times a day.       ascorbic acid (VITAMIN C) 1000 MG tablet Take 1,000 mg by mouth daily.       atorvastatin (LIPITOR) 40 MG tablet Take 40 mg by mouth daily.       budesonide-formoterol (SYMBICORT) 160-4.5 mcg/actuation inhaler Inhale 2 puffs 2 (two) times a day.       CALCIUM POLYCARBOPHIL (FIBER-LAX ORAL) Take 5 mg by mouth 2 (two) times a day.        CARTIA  mg 24 hr capsule TAKE 1 CAPSULE BY  "MOUTH EVERY EVENING 90 capsule 1     co-enzyme Q-10 30 mg capsule Take 100 mg by mouth daily.       losartan (COZAAR) 100 MG tablet TAKE 1 TABLET BY MOUTH DAILY 90 tablet 0     magnesium chloride (SLOW-MAG) 64 mg TbEC delayed-release tablet Take 1 tablet (64 mg total) by mouth 2 (two) times a day. 180 tablet 2     multivitamin (MULTIVITAMIN) per tablet Take 1 tablet by mouth daily.       OMEGA-3/DHA/EPA/FISH OIL (FISH OIL-OMEGA-3 FATTY ACIDS) 300-1,000 mg capsule Take 2 g by mouth 2 (two) times a day.        senna-docusate (SENNOSIDES-DOCUSATE SODIUM) 8.6-50 mg tablet Take 2 tablets by mouth 2 (two) times a day.       sotalol (BETAPACE) 120 MG tablet Take 1 tablet (120 mg total) by mouth 2 (two) times a day. 180 tablet 2     tadalafil (CIALIS) 20 MG tablet Take 20 mg by mouth as needed.       tiotropium (SPIRIVA) 18 mcg inhalation capsule Place 18 mcg into inhaler and inhale daily.       VITAMIN D2 50,000 unit capsule Take 50,000 Units by mouth 2 (two) times a week.  3     warfarin (COUMADIN) 5 MG tablet Take 2.5 mg Sun,  and 5 mg all other days. 130 tablet 0     furosemide (LASIX) 20 MG tablet Take 1 tablet (20 mg total) by mouth daily. 90 tablet 5     meloxicam (MOBIC) 15 MG tablet Take 15 mg by mouth daily.       No current facility-administered medications for this visit.          Surgical site   Pacemaker is well-healed right subclavicular site-multiple scars    Device interrogation  Successful cardioversion was demonstrated November 18. Relapse of atrial fibrillation demonstrated December 26 with spontaneous conversion about 4 days later  During atrial fibrillation heart rates are less than 120  Battery voltage is good, leads are satisfactory  No ventricular tachycardia    Objective:   Vital signs:  Visit Vitals     /82 (Patient Site: Left Arm, Patient Position: Sitting, Cuff Size: Adult Regular)     Pulse 68     Ht 6' 3\" (1.905 m)     Wt (!) 270 lb (122.5 kg)     BMI 33.75 kg/m2         Physical " Exam:  Alert and appropriate a bit overweight    GENERAL APPEARANCE: Alert, cooperative and in no acute distress.  HEENT: No scleral icterus. No Xanthelasma. Oral mucous membranes pink and moist.  NECK: JVP normal cm. No Hepatojugular reflux. Thyroid not  Palpable  CHEST: clear to auscultation and percussion  CARDIOVASCULAR: S1, S2 2-3/6 systolic ejection murmur     Brachial, radial  pulses are intact and symmetric.   No carotid bruits noted.  ABDOMEN: Non tender. BS+. No bruits.  EXTREMITIES: No cyanosis, clubbing or edema.    Lab Results:  LIPIDS:  Lab Results   Component Value Date    CHOL 156 12/13/2016    CHOL 124 05/06/2016    CHOL 132 07/22/2015     Lab Results   Component Value Date    HDL 60 12/13/2016    HDL 47 05/06/2016    HDL 64 07/22/2015     Lab Results   Component Value Date    LDLCALC 81 12/13/2016    LDLCALC 62 05/06/2016    LDLCALC 59 07/22/2015     Lab Results   Component Value Date    TRIG 73 12/13/2016    TRIG 73 05/06/2016    TRIG 43 07/22/2015     No components found for: CHOLHDL    BMP:  Lab Results   Component Value Date    CREATININE 1.23 11/17/2016    BUN 25 11/17/2016     11/17/2016    K 5.5 (H) 12/13/2016     11/17/2016    CO2 24 11/17/2016         This note has been dictated using voice recognition software. Any grammatical or context distortions are unintentional and inherent to the software.  Everett Renee MD  [facility]  [phone]

## 2021-06-08 NOTE — TELEPHONE ENCOUNTER
ANTICOAGULATION  MANAGEMENT-Patient Home Monitoring Result    Assessment     Therapeutic INR result of 2.5 (goal INR of 2.0-3.0) received via fax from [employer] home INR monitoring company.        Previous INR was Therapeutic    Buck Sinclair last contacted by phone: 5/5/2020    Plan     Per home monitoring agreement with patient, patient was NOT contacted regarding therapeutic result today.  Patient is to continue current dose and continue to check INR with home monitor per protocol.  ?   Gisele Prince RN    Subjective/Objective:      Buck Sinclair, a 75 y.o. male  is established on warfarin using a home INR monitor.    Anticoagulation Episode Summary     Current INR goal:   2.0-3.0   TTR:   90.3 % (1 y)   Next INR check:   6/16/2020   INR from last check:   2.50 (6/9/2020)   Weekly max warfarin dose:      Target end date:      INR check location:      Preferred lab:      Send INR reminders to:   [facility] HEART CARE    Indications    Persistent Atrial Fibrillation [I48.91]           Comments:   home monitor talk to wife about dose [facility]   [phone]            Anticoagulation Care Providers     Provider Role Specialty Phone number    Erica Hansen CNP  Cardiology [phone]    Everett Renee MD  Cardiology [phone]

## 2021-06-08 NOTE — TELEPHONE ENCOUNTER
Buck called to say he does not think he is getting enough O2 with his new POC. ([employer] ) Instructed to go up to 4 or 5 liters with activity and if not able to keep his O2 SATs above 88 % to call back tomorrow and we will need to order him something with a continuous flow.

## 2021-06-08 NOTE — TELEPHONE ENCOUNTER
Received a faxed INR result for Buck Sinclair  From [facility]  INR result dated 5/26/2020 is 2.9

## 2021-06-08 NOTE — TELEPHONE ENCOUNTER
Received a faxed INR result for Buck Sinclair  From [facility]    INR result dated 5/12/2020 is 2.4

## 2021-06-08 NOTE — TELEPHONE ENCOUNTER
ANTICOAGULATION  MANAGEMENT-Patient Home Monitoring Result    Assessment     Therapeutic INR result of 2.9 (goal INR of 2.0-3.0) received via fax from [employer] home INR monitoring company.        Previous INR was Therapeutic    Buck Sinclair last contacted by phone: 5/5/2020    Plan     Per home monitoring agreement with patient, patient was NOT contacted regarding therapeutic result today.  Patient is to continue current dose and continue to check INR with home monitor per protocol.  ?   Gisele Prince RN    Subjective/Objective:      Buck Sinclair, a 75 y.o. male  is established on warfarin using a home INR monitor.    Anticoagulation Episode Summary     Current INR goal:   2.0-3.0   TTR:   90.3 % (1 y)   Next INR check:   6/2/2020   INR from last check:   2.90 (5/26/2020)   Weekly max warfarin dose:      Target end date:      INR check location:      Preferred lab:      Send INR reminders to:   [facility] HEART CARE    Indications    Persistent Atrial Fibrillation [I48.91]           Comments:   home monitor talk to wife about dose [facility]   [phone]            Anticoagulation Care Providers     Provider Role Specialty Phone number    Erica Hansen CNP  Cardiology [phone]    Everett Renee MD  Cardiology [phone]

## 2021-06-08 NOTE — PROGRESS NOTES
Continue current dosing of Warfarin. Continue current diet of moderate Vitamin K intake. Call with questions or concerns or any change in medication. Call with increased bleeding or bruising or any upcoming procedures.

## 2021-06-08 NOTE — TELEPHONE ENCOUNTER
Received a faxed INR result for Buck Sinclair  From [employer] [facility]    INR result dated 5/19/2020 is 2.3

## 2021-06-08 NOTE — TELEPHONE ENCOUNTER
ANTICOAGULATION  MANAGEMENT-Patient Home Monitoring Result    Assessment     Therapeutic INR result of 2.4 (goal INR of 2.0-3.0) received via fax from [employer] home INR monitoring company.        Previous INR was Therapeutic    Buck Sinclair last contacted by phone: 5/5/2020    Plan     Per home monitoring agreement with patient, patient was NOT contacted regarding therapeutic result today.  Patient is to continue current dose and continue to check INR with home monitor per protocol.  ?   Gisele Prince RN    Subjective/Objective:      Buck Sinclair, a 75 y.o. male  is established on warfarin using a home INR monitor.    Anticoagulation Episode Summary     Current INR goal:   2.0-3.0   TTR:   90.3 % (1 y)   Next INR check:   6/9/2020   INR from last check:   2.40 (6/2/2020)   Weekly max warfarin dose:      Target end date:      INR check location:      Preferred lab:      Send INR reminders to:   [facility] HEART CARE    Indications    Persistent Atrial Fibrillation [I48.91]           Comments:   home monitor talk to wife about dose [facility]   [phone]            Anticoagulation Care Providers     Provider Role Specialty Phone number    Erica Hansen CNP  Cardiology [phone]    Everett Renee MD  Cardiology [phone]

## 2021-06-09 ENCOUNTER — RECORDS - HEALTHEAST (OUTPATIENT)
Dept: ADMINISTRATIVE | Facility: CLINIC | Age: 76
End: 2021-06-09

## 2021-06-09 NOTE — PROGRESS NOTES
Type: routine ICD remote transmission done prior to seeing Lauren on 3/9.  Presenting rhythm: AP-VS, rate 75bpm.  Battery/lead status: stable.  Arrhythmias: since 2/5/17; 3 AT/AF episodes, longest lasting 24hrs on 2/19/17, v-rates well controlled, AF burden 1.9%. No VT/VF detected.  Comments: appears to be normal ICD function. Patient takes warfarin. Routed results to Lauren.   Device/lead alerts: none. LOUIE

## 2021-06-09 NOTE — PROGRESS NOTES
1945  [phone] (home)  [phone] (mobile)    +++Important patient information for [facility]/Cath Lab staff : PT IS ONSOTALOL, PT HAS PACEMAKER+++    [facility] EP Cath Lab Procedure Order   Cardioversion:    Cardioversion    INR'S ARE IN [facility] UNDER LAB TAB AND CLEARED WITH COMFORT LINTON CNP    3/21=2.8  3/14=3.8  3/7=3.20  2/28=2.3  2/21=2.1    Diagnosis:  AF  Anticipated Case Duration:  Standard  Scheduling Needs/Timeframe:  PT IS SETFOR MONDAY 4/3/2017 AT12 00    Current Device: Dual ICD  Device Company/Device Rep Needed for Procedure: [employer]    Anesthesia:  General-CV Only  Research Protocol:  No    [facility] EP Cath Lab Prep   Ordering Provider: Comfort Linton NP  Ordering Date: 3/27/2017  Orders Status: Intial order placed and Order set placed  EP NC Contact: Palma Vega LPN    H&P:  Compled by COMFORT LINTON CNP on 3/8/2017  PCP: Vivek Guerrero MD, [phone]    Pre-op Labs: N/A for procedure    Medical Records Pertinent for Procedure:  N/A    Patient Education:  ALL INSTRUCTIONS REVIEWED WITH WIFE    PT HAS A  FOR PROCEDURE  PT'S INR'S CLEARED WITH COMFORT LINTON CNP IN EPIC UNDER LAB TAB  PT IS ON SOTALOL  PT INSTRUCTED TO HOLD ANY VITAMINS, MINERALS, CALCIUM, IRON OR SUPPLEMENTS THE MORNING PF CV  PT IS TO HAVE A 3-4 WEEK FOLLOW UP WITH COMFORT  AND SCHEDULERS NOTIFIED    Teach with Patient: Completed via phone on 3/28/2017    Risks Reviewed:     Cardioversion    >90% acute success rate, <10% failure to convert or   reverts shortly after cardioversion.    <1% embolic event of (CVA, pulmonary embolism, or   other site).    75% risk for superficial burn.  Risks associated with general anesthesia will be addressed by the Anesthesiology Department    Consent: Will be obtained in [facility] day of procedure    Pre-Procedure Instructions that were Reviewed with Patient:  NPO after midnight, Notified patient of time and date of procedure by CV , Transportation arrangements needed s/p procedure, Post-procedure  follow up process and Sedation plan/orders    Pre-Procedure Medication Instructions:  Instructions given to pt regarding anticoagulants: Coumadin- instructed to continue anticoagulation uninterrupted through their procedure  Instructions given to pt regarding antiarrhythmic medication: Sotalol; Pt instructed to continue medication prior to procedure  Instructions for medication, other than anticoagulants/antiarrhythmics listed above, given to pt: to take all morning medications with small sips of water, with the exception of OTC supplements and MVI    Allergies   Allergen Reactions     Adhesive Other (See Comments)     ADHESIVE TAPE; SKIN IRRITATION     Amiodarone      ADVERSE REACTION.  Sunlight sensitivity.     Lisinopril Cough       Current Outpatient Prescriptions:      ACETAMINOPHEN (TYLENOL ORAL), Take 2 tablets by mouth 2 (two) times a day., Disp: , Rfl:      ascorbic acid (VITAMIN C) 1000 MG tablet, Take 1,000 mg by mouth daily., Disp: , Rfl:      atorvastatin (LIPITOR) 40 MG tablet, Take 40 mg by mouth daily., Disp: , Rfl:      budesonide-formoterol (SYMBICORT) 160-4.5 mcg/actuation inhaler, Inhale 2 puffs 2 (two) times a day., Disp: , Rfl:      CALCIUM POLYCARBOPHIL (FIBER-LAX ORAL), Take 5 mg by mouth 2 (two) times a day. , Disp: , Rfl:      CARTIA  mg 24 hr capsule, TAKE 1 CAPSULE BY MOUTH EVERY EVENING, Disp: 90 capsule, Rfl: 1     co-enzyme Q-10 30 mg capsule, Take 100 mg by mouth daily., Disp: , Rfl:      furosemide (LASIX) 20 MG tablet, Take 1 tablet (20 mg total) by mouth daily., Disp: 90 tablet, Rfl: 5     losartan (COZAAR) 100 MG tablet, TAKE 1 TABLET BY MOUTH DAILY, Disp: 90 tablet, Rfl: 0     MAG 64 64 mg TbEC delayed-release tablet, TAKE 1 TABLET BY MOUTH TWICE DAILY, Disp: 180 tablet, Rfl: 2     meloxicam (MOBIC) 15 MG tablet, Take 15 mg by mouth daily., Disp: , Rfl:      multivitamin (MULTIVITAMIN) per tablet, Take 1 tablet by mouth daily., Disp: , Rfl:      OMEGA-3/DHA/EPA/FISH OIL  (FISH OIL-OMEGA-3 FATTY ACIDS) 300-1,000 mg capsule, Take 2 g by mouth 2 (two) times a day. , Disp: , Rfl:      senna-docusate (SENNOSIDES-DOCUSATE SODIUM) 8.6-50 mg tablet, Take 2 tablets by mouth 2 (two) times a day., Disp: , Rfl:      sotalol (BETAPACE) 120 MG tablet, TAKE 1 TABLET(120 MG) BY MOUTH TWICE DAILY, Disp: 180 tablet, Rfl: 0     tadalafil (CIALIS) 20 MG tablet, Take 20 mg by mouth as needed., Disp: , Rfl:      tiotropium (SPIRIVA) 18 mcg inhalation capsule, Place 18 mcg into inhaler and inhale daily., Disp: , Rfl:      VITAMIN D2 50,000 unit capsule, Take 50,000 Units by mouth 2 (two) times a week., Disp: , Rfl: 3     warfarin (COUMADIN) 5 MG tablet, Take 2.5 mg Sun,  and 5 mg all other days., Disp: 130 tablet, Rfl: 0     warfarin (COUMADIN) 5 MG tablet, TAKE 1/2 TABLET BY MOUTH ON SUNDAY, WEDNESDAY AND FRIDAY AND 1 TABLET DAILY ALL OTHER DAYS, Disp: 130 tablet, Rfl: 0

## 2021-06-09 NOTE — TELEPHONE ENCOUNTER
ANTICOAGULATION  MANAGEMENT-Patient Home Monitoring Result    Assessment     Therapeutic INR result of 2.2 (goal INR of 2.0-3.0) received via fax from [employer] home INR monitoring company.        Previous INR was Therapeutic    Buck Sinclair last contacted by phone: 5/5/2020    Plan     Per home monitoring agreement with patient, patient was NOT contacted regarding therapeutic result today.  Patient is to continue current dose and continue to check INR with home monitor per protocol.  ?   Gisele Prince RN    Subjective/Objective:      Buck Sinclair, a 75 y.o. male  is established on warfarin using a home INR monitor.    Anticoagulation Episode Summary     Current INR goal:   2.0-3.0   TTR:   93.9 % (1 y)   Next INR check:   7/28/2020   INR from last check:   2.20 (7/21/2020)   Weekly max warfarin dose:      Target end date:      INR check location:      Preferred lab:      Send INR reminders to:   [facility]    Indications    Persistent Atrial Fibrillation [I48.91]           Comments:   home monitor talk to wife about dose [facility]   [phone]            Anticoagulation Care Providers     Provider Role Specialty Phone number    Erica Hansen CNP  Cardiology [phone]    Everett Renee MD  Cardiology [phone]

## 2021-06-09 NOTE — TELEPHONE ENCOUNTER
ANTICOAGULATION  MANAGEMENT-Patient Home Monitoring Result    Assessment     Therapeutic INR result of 2.1 (goal INR of 2.0-3.0) received via fax from [employer]   home INR monitoring company.        Previous INR was Therapeutic    Buck Sinclair last contacted by phone: 5/5/2020    Plan     Per home monitoring agreement with patient, patient was NOT contacted regarding therapeutic result today.  Patient is to continue current dose and continue to check INR with home monitor per protocol.  ?   Gisele Prince RN    Subjective/Objective:      Buck Sinclair, a 75 y.o. male  is established on warfarin using a home INR monitor.    Anticoagulation Episode Summary     Current INR goal:   2.0-3.0   TTR:   91.8 % (1 y)   Next INR check:   6/30/2020   INR from last check:   2.10 (6/23/2020)   Weekly max warfarin dose:      Target end date:      INR check location:      Preferred lab:      Send INR reminders to:   [facility]    Indications    Persistent Atrial Fibrillation [I48.91]           Comments:   home monitor talk to wife about dose [facility]   [phone]            Anticoagulation Care Providers     Provider Role Specialty Phone number    Erica Hansen CNP  Cardiology [phone]    Everett Renee MD  Cardiology [phone]

## 2021-06-09 NOTE — PROGRESS NOTES
INR 3.2 pt had ETOH last night and will increase greens. Retest in 2 weeks. After talking with pt and discussing history of greens/salads and medication change. Pt will  continue  with current diet and dosing of Warfarin.  Continue with moderation of Vit K and green leafy vegetables. Cautioned to call with increase bruising or bleeding. Reminded to call with medication change especially antibiotic. Call with any questions or concerns or any up coming procedures. Cautioned about using Herbal medication.

## 2021-06-09 NOTE — TELEPHONE ENCOUNTER
ANTICOAGULATION  MANAGEMENT-Patient Home Monitoring Result    Assessment     Therapeutic INR result of 2.1 (goal INR of 2.0-3.0) received via fax from [employer] home INR monitoring company.        Previous INR was Therapeutic    Buck Sinclair last contacted by phone: 5/5/2020    Plan     Per home monitoring agreement with patient, patient was NOT contacted regarding therapeutic result today.  Patient is to continue current dose and continue to check INR with home monitor per protocol.  ?   Gisele Prince RN    Subjective/Objective:      Buck Sinclair, a 75 y.o. male  is established on warfarin using a home INR monitor.    Anticoagulation Episode Summary     Current INR goal:   2.0-3.0   TTR:   91.8 % (1 y)   Next INR check:   7/7/2020   INR from last check:   2.10 (6/30/2020)   Weekly max warfarin dose:      Target end date:      INR check location:      Preferred lab:      Send INR reminders to:   [facility]    Indications    Persistent Atrial Fibrillation [I48.91]           Comments:   home monitor talk to wife about dose [facility]   [phone]            Anticoagulation Care Providers     Provider Role Specialty Phone number    Erica Hansen CNP  Cardiology [phone]    Everett Renee MD  Cardiology [phone]

## 2021-06-09 NOTE — TELEPHONE ENCOUNTER
Received a faxed INR result for Buck Sinclair  From [facility]  INR result dated 7/7/2020 is 2.0

## 2021-06-09 NOTE — TELEPHONE ENCOUNTER
Received a faxed INR result for Buck Sinclair  From [employer] [facility]    INR result dated 7/14/2020 is 2.0

## 2021-06-09 NOTE — TELEPHONE ENCOUNTER
ANTICOAGULATION  MANAGEMENT-Patient Home Monitoring Result    Assessment     Therapeutic INR result of 2.0 (goal INR of 2.0-3.0) received via fax from [employer] home INR monitoring company.        Previous INR was Therapeutic    Buck Sinclair last contacted by phone: 5/5/2020    Plan     Per home monitoring agreement with patient, patient was NOT contacted regarding therapeutic result today.  Patient is to continue current dose and continue to check INR with home monitor per protocol.  ?   Gisele Prince RN    Subjective/Objective:      Buck Sinclair, a 75 y.o. male  is established on warfarin using a home INR monitor.    Anticoagulation Episode Summary     Current INR goal:   2.0-3.0   TTR:   91.8 % (1 y)   Next INR check:   7/14/2020   INR from last check:   2.00 (7/7/2020)   Weekly max warfarin dose:      Target end date:      INR check location:      Preferred lab:      Send INR reminders to:   [facility]    Indications    Persistent Atrial Fibrillation [I48.91]           Comments:   home monitor talk to wife about dose [facility]   [phone]            Anticoagulation Care Providers     Provider Role Specialty Phone number    Erica Hansen CNP  Cardiology [phone]    Everett Renee MD  Cardiology [phone]

## 2021-06-09 NOTE — TELEPHONE ENCOUNTER
Received a faxed INR result for Buck Sinclair  From [employer] [facility]    INR result dated 6/30/2020 is 2.1

## 2021-06-09 NOTE — PROGRESS NOTES
[facility]    Assessment/Plan:      Problem List Items Addressed This Visit     Essential hypertension (Chronic)    Persistent Atrial Fibrillation - Primary (Chronic)    Cardiomyopathy, idiopathic (Chronic)    Relevant Orders    Basic Metabolic Panel    History of sick sinus syndrome (Chronic)        1.  Persistent atrial fibrillation: Low burden of arrhythmia with one episode lasting 24 hours which was asymptomatic.  Symptoms consist of fatigue and dyspnea on exertion with prolonged episodes; he is asymptomatic with shorter episodes.  Continue sotalol 120 mg twice a day.  We discussed the likelihood of eventually switching to a rate control strategy if he has significant recurrence on sotalol.  Continue warfarin for stroke prophylaxis.    2.  Sick sinus syndrome status post pacemaker implant with subsequent upgrade to ICD for idiopathic cardiomyopathy: Appears well compensated with no signs of acute fluid overload.  The ICD was remotely interrogated and is functioning appropriately.  The battery shows estimated longevity of 6.9 years.  Atrial and ventricular leads are stable and within normal limits.      3.  Hypertension: Blood pressure at target today.    4.  Cardiomyopathy: Appears well compensated with no sign of acute fluid overload on exam today.  He has had symptomatic improvement since the increase in furosemide; continue furosemide 20 mg daily.  BMP drawn today, results pending.  He was instructed to weigh himself daily and call for a 2-3 pound increase in 2 days or 5 pounds in one week.    Follow-up with Dr. Barrow in 6 months.  Follow-up with Dr. Renee in one year.    Subjective:      Buck Sinclair, a pleasant 72-year-old gentleman, comes in today accompanied by his wife for follow-up of atrial fibrillation.  He has a long history of atrial fibrillation and flutter for which he underwent complex left and right atrial ablations in April 2011 and a redo in July 2011 with recurrence of  atrial fibrillation after the last ablation.  Previously, he has failed all antiarrhythmic medications and is intolerant of amiodarone, however has been fairly well suppressed with sotalol 120 mg twice a day.  He reports having, in total, over 20 cardioversions, the last on 11/18/16.  Symptoms typically consist of progressive fatigue and dyspnea on exertion with persistent episodes, though he is asymptomatic with shorter episodes.  He remains on long-term anticoagulation for a [id number] score of 2 for age and hypertension; he was taking Xarelto, but this was discontinued due to a concern for interaction with his meloxicam.  He has been on warfarin since 9/16/15 which he does through home monitoring.  Left atrial appendage occlusion with the Watchman device has also been discussed as a possible future option.     He has a history of hypertension, hyperlipidemia, and sinus bradycardia for which he initially underwent pacemaker implant on 5/18/99 with a dual site atrial pacing system.  He underwent generator replacement on 5/23/05 at which time one of the atrial leads was noted to have an insulation fracture that was repaired with a silicone sleeve, but ultimately capped and abandoned.  Due to advanced dilated cardiomyopathy with an EF of 35%, he underwent lead extraction the right atrial and ventricular pacing leads to allow for an upgrade to an ICD system on 6/11/14.  Echocardiogram performed 9/21/15 shows near-normalization of his left ventricular systolic performance with an EF of 50%.   He underwent a sleep study on 5/11/15 that did not show significant obstructive sleep apnea/hypopnea sufficient to disturb sleep.    Chente reports no symptomatic episodes of atrial fibrillation.  He says that over the past couple of months his energy level has improved and his breathing is better, particularly after Dr. Renee increased his furosemide to 20 mg daily.  He denies chest discomfort, palpitations, shortness of breath,  paroxysmal nocturnal dyspnea, orthopnea, lightheadedness, dizziness, pre-syncope, or syncope.      Medical, surgical, family, social history, and medications were reviewed and updated as necessary.    Patient Active Problem List   Diagnosis     Dyslipidemia     Essential hypertension     Persistent Atrial Fibrillation     Cardiomyopathy, idiopathic     History of sick sinus syndrome       Past Medical History:   Diagnosis Date     Anemia      BPH (benign prostatic hyperplasia)      COPD (chronic obstructive pulmonary disease)      Dyslipidemia, goal LDL below 100      Essential hypertension      Persistent atrial fibrillation      Skin cancer of trunk      Ventricular tachycardia        Past Surgical History:   Procedure Laterality Date     CARDIAC DEFIBRILLATOR PLACEMENT       CARDIOVERSION      x20, last 2/12/15, 10/2015, 11/18/16 by Lauren Foster CNP     FRACTURE SURGERY Left     wrist     INGUINAL HERNIA REPAIR Left 1967    while in the [employer] in [place] after 13 month in [place]     ID ABLATE HEART DYSRHYTHM FOCUS  04/2011    Catheter Ablation Atrial Fibrillation PVI Apr 2011 (Cryo+RF-PVI + roof line + CTI line)     ID ABLATE HEART DYSRHYTHM FOCUS  07/2011    Re-do PVI Jul 2011 (RFA-PVI + CFE + VIDYA + confirmation of CTI line)     ID MYERS W/O FACETEC FORAMOT/DSKC 1/2 VRT SEG, CERVICAL      Laminectomy Lumbar;  Recorded: 03/09/2012;     TOTAL SHOULDER REPLACEMENT Right 03/03/2016    Dr. Abernathy of [facility]       Allergies   Allergen Reactions     Adhesive Other (See Comments)     ADHESIVE TAPE; SKIN IRRITATION     Amiodarone      ADVERSE REACTION.  Sunlight sensitivity.     Lisinopril Cough       Current Outpatient Prescriptions   Medication Sig Dispense Refill     ACETAMINOPHEN (TYLENOL ORAL) Take 2 tablets by mouth 2 (two) times a day.       ascorbic acid (VITAMIN C) 1000 MG tablet Take 1,000 mg by mouth daily.       atorvastatin (LIPITOR) 40 MG tablet Take 40 mg by mouth daily.        budesonide-formoterol (SYMBICORT) 160-4.5 mcg/actuation inhaler Inhale 2 puffs 2 (two) times a day.       CALCIUM POLYCARBOPHIL (FIBER-LAX ORAL) Take 5 mg by mouth 2 (two) times a day.        CARTIA  mg 24 hr capsule TAKE 1 CAPSULE BY MOUTH EVERY EVENING 90 capsule 1     co-enzyme Q-10 30 mg capsule Take 100 mg by mouth daily.       furosemide (LASIX) 20 MG tablet Take 1 tablet (20 mg total) by mouth daily. 90 tablet 5     losartan (COZAAR) 100 MG tablet TAKE 1 TABLET BY MOUTH DAILY 90 tablet 0     MAG 64 64 mg TbEC delayed-release tablet TAKE 1 TABLET BY MOUTH TWICE DAILY 180 tablet 2     meloxicam (MOBIC) 15 MG tablet Take 15 mg by mouth daily.       multivitamin (MULTIVITAMIN) per tablet Take 1 tablet by mouth daily.       OMEGA-3/DHA/EPA/FISH OIL (FISH OIL-OMEGA-3 FATTY ACIDS) 300-1,000 mg capsule Take 2 g by mouth 2 (two) times a day.        senna-docusate (SENNOSIDES-DOCUSATE SODIUM) 8.6-50 mg tablet Take 2 tablets by mouth 2 (two) times a day.       sotalol (BETAPACE) 120 MG tablet TAKE 1 TABLET(120 MG) BY MOUTH TWICE DAILY 180 tablet 0     tadalafil (CIALIS) 20 MG tablet Take 20 mg by mouth as needed.       tiotropium (SPIRIVA) 18 mcg inhalation capsule Place 18 mcg into inhaler and inhale daily.       VITAMIN D2 50,000 unit capsule Take 50,000 Units by mouth 2 (two) times a week.  3     warfarin (COUMADIN) 5 MG tablet Take 2.5 mg Sun,  and 5 mg all other days. 130 tablet 0     warfarin (COUMADIN) 5 MG tablet TAKE 1/2 TABLET BY MOUTH ON SUNDAY, WEDNESDAY AND FRIDAY AND 1 TABLET DAILY ALL OTHER DAYS 130 tablet 0     No current facility-administered medications for this visit.        Family History   Problem Relation Age of Onset     Cancer Mother      leukemia     Cancer Father      bladder     Cancer Sister        Social History   Substance Use Topics     Smoking status: Former Smoker     Types: Cigarettes     Quit date: 1/1/1968     Smokeless tobacco: None     Alcohol use 1.2 oz/week     2 Cans of  "beer per week      Comment: 1 beer per week       Review of Systems:   General: WNL  Eyes: WNL  Ears/Nose/Throat: WNL  Lungs: WNL  Heart: WNL  Stomach: WNL  Bladder: WNL  Muscle/Joints: WNL  Skin: WNL  Nervous System: WNL  Mental Health: WNL     Blood: WNL      Objective:      Visit Vitals     /82 (Patient Site: Left Arm, Patient Position: Sitting, Cuff Size: Adult Large)     Pulse 80     Resp 16     Ht 6' 3\" (1.905 m)     Wt (!) 283 lb 12.8 oz (128.7 kg)     BMI 35.47 kg/m2       Physical Exam  General Appearance:  Alert, well-appearing, in no acute distress.     HEENT:  Atraumatic, normocephalic; no scleral icterus, EOM intact; the mucous membranes were pink and moist.   Chest: Chest symmetric, spine straight.  Right subclavian ICD site with no sign of infection or tissue thinning.     Lungs:   Respirations unlabored; the lungs are clear to auscultation.   Cardiovascular:   Auscultation reveals normal first and second heart sounds with no murmurs, rubs, or gallops.  Regular rate and rhythm. No JVD.  Radial and posterior tibial pulses are intact.    Abdomen:  Soft, nontender, nondistended, bowel sounds present.     Extremities: No cyanosis, clubbing, or edema.   Musculoskeletal:  Moves all extremities.     Skin: No xanthelasma.   Neurologic: Mood and affect are appropriate. Oriented to person, place, time, and situation.       Cardiographics  Post cardioversion ECG done 11/18/16 was personally reviewed, shows atrial pacing with intrinsic ventricular conduction at 82 bpm, QT/QTc interval measures 438/511 ms    Echocardiogram: Done 9/21/15  Technically difficult examination.   Mildly dilated left ventricle.   Mild eccentric left ventricular hypertrophy is present.   Overall left ventricular systolic function is mildly depressed.   Left ventricular ejection fraction is visually estimated to be 50%.   Moderate biatrial enlargement.  When compared to the previous echocardiogram dated 4/16/15 , LVEF is "   higher.    ICD Interrogation (remote 3/6/17):  Pacing mode: MVP  bpm  Presenting rhythm: AP-VS 75 bpm  A-paced: 96.4%.  V-paced 1.2%.  Battery: estimated longevity 6.9 years.  Atrial and Ventricular leads: Stable   Arrhythmias: 3 AT/AF, longest 24 hours on 2/19/17, controlled ventricular response  [place]: 1.9%  Histograms: Good rate distribution  Heart failure diagnostics: No indication of fluid overload.  OptiVol below threshold and thoracic impedance at reference line    Lab Review   Lab Results   Component Value Date    CREATININE 1.23 11/17/2016    BUN 25 11/17/2016     11/17/2016    K 5.5 (H) 12/13/2016     11/17/2016    CO2 24 11/17/2016     BMP drawn today, results pending.  Lab Results   Component Value Date    INR 3.20 (!) 03/07/2017    INR 2.3 02/28/2017    INR 2.10 (!) 02/21/2017         Greater than than 30 minutes were spent face to face in this visit with more than 50% spent discussing diagnoses as listed above, counseling, and coordination of care.    Lauren Foster, [facility], Electrophysiology  [phone]    This note has been dictated using voice recognition software. Any grammatical, typographical, or context distortions are unintentional and inherent to the software.

## 2021-06-09 NOTE — PROGRESS NOTES
INR 3.8. Spoke with wife and she will use greens and green tea to bring INR down. Will retest in one week. After talking with pt and discussing history of greens/salads and medication change. Pt will  continue  with current diet and dosing of Warfarin.  Continue with moderation of Vit K and green leafy vegetables. Cautioned to call with increase bruising or bleeding. Reminded to call with medication change especially antibiotic. Call with any questions or concerns or any up coming procedures. Cautioned about using Herbal medication.

## 2021-06-10 NOTE — TELEPHONE ENCOUNTER
ANTICOAGULATION  MANAGEMENT-Patient Home Monitoring Result    Assessment     Therapeutic INR result of 2.2 (goal INR of 2.0-3.0) received via fax from [employer] home INR monitoring company.        Previous INR was Therapeutic    Buck Sinclair last contacted by phone: 8/4/2020    Plan     Per home monitoring agreement with patient, patient was NOT contacted regarding therapeutic result today.  Patient is to continue current dose and continue to check INR with home monitor per protocol.  ?   Gisele Prince RN    Subjective/Objective:      Buck Sinclair, a 75 y.o. male  is established on warfarin using a home INR monitor.    Anticoagulation Episode Summary     Current INR goal:   2.0-3.0   TTR:   93.9 % (1 y)   Next INR check:   8/25/2020   INR from last check:   2.20 (8/18/2020)   Weekly max warfarin dose:      Target end date:      INR check location:      Preferred lab:      Send INR reminders to:   [facility]    Indications    Persistent Atrial Fibrillation [I48.91]           Comments:   home monitor talk to wife about dose [facility]   [phone]            Anticoagulation Care Providers     Provider Role Specialty Phone number    Erica Hansen CNP  Cardiology [phone]    Everett Renee MD  Cardiology [phone]

## 2021-06-10 NOTE — TELEPHONE ENCOUNTER
Received a faxed INR result for Buck Sinclair  From [facility]  INR result dated 7/28/2020 is 2.2

## 2021-06-10 NOTE — PROGRESS NOTES
INR 3.7 with home monitor. Pt is going for CV after 4 weeks of higher INR's. Granrud adjusted dose to 2.5 mg Monday and Thur and 5 mg all other days. After talking with pt and discussing history of greens/salads and medication change. Pt will  continue  with current diet and dosing of Warfarin.  Continue with moderation of Vit K and green leafy vegetables. Cautioned to call with increase bruising or bleeding. Reminded to call with medication change especially antibiotic. Call with any questions or concerns or any up coming procedures. Cautioned about using Herbal medication.

## 2021-06-10 NOTE — TELEPHONE ENCOUNTER
Received a faxed INR result for Buck Sinclair  From [facility]  INR result dated 8/4/2020 is 2.4

## 2021-06-10 NOTE — PROGRESS NOTES
INR on home monitor at 3.6 will hold Warfarin for 2 days for eye surgery and then return to current dosing. Pt is being admitted 4/28 for colonoscopy. After talking with pt and discussing history of greens/salads and medication change. Pt will  continue  with current diet and dosing of Warfarin.  Continue with moderation of Vit K and green leafy vegetables. Cautioned to call with increase bruising or bleeding. Reminded to call with medication change especially antibiotic. Call with any questions or concerns or any up coming procedures. Cautioned about using Herbal medication.  Talked to wife about dosing.

## 2021-06-10 NOTE — PROGRESS NOTES
INR home monitor at 1.3. Will decrease greens and green tea. Will stay off the multi vit for another week. After talking with pt and discussing history of greens/salads and medication change. Pt will  continue  with current diet and dosing of Warfarin.  Continue with moderation of Vit K and green leafy vegetables. Cautioned to call with increase bruising or bleeding. Reminded to call with medication change especially antibiotic. Call with any questions or concerns or any up coming procedures. Cautioned about using Herbal medication.

## 2021-06-10 NOTE — TELEPHONE ENCOUNTER
Who is calling:  Patient's wife  Reason for Call:  Asking for call back from ACN to update changes from INR encounter 07/28, wife states there have been no changes, however, asked for call back from ACN to discuss.  Date of last appointment with primary care: na  Okay to leave a detailed message: Yes

## 2021-06-10 NOTE — PROGRESS NOTES
Rep device check.  Pt seeing Dr Renee in follow up today. Please see link for full device report.  Patient was informed of results and next follow up during today's visit.

## 2021-06-10 NOTE — TELEPHONE ENCOUNTER
Prior Authorization Request  Who s requesting:  Pharmacy  Pharmacy Name and Location: [facility]  Medication Name: furosemide (LASIX) 20 MG tablet   Insurance Plan: MEDICARE  Insurance Member ID Number:  [id number]   CoverMyMeds Key: [id number]  Informed patient that prior authorizations can take up to 10 business days for response:   No  Okay to leave a detailed message: No

## 2021-06-10 NOTE — TELEPHONE ENCOUNTER
Anticoagulation Management    Unable to reach Buck today.    Today's INR result of 2.2 is Therapeutic (goal INR of 2.0-3.0).     Follow up required to 12 weeks follow up per Home monitor protocol.    Left message to continue current dose of warfarin  tonight.       ACN to follow up    Gisele Prince RN

## 2021-06-10 NOTE — TELEPHONE ENCOUNTER
ANTICOAGULATION  MANAGEMENT    Assessment     Today's INR result of 2.4 is Therapeutic (goal INR of 2.0-3.0)        Warfarin taken as previously instructed    Not drinking v8 but increased green vegetables due to high salt content of v8 may be affecting diet and INR    No new medication/supplements affecting INR    Continues to tolerate warfarin with no reported s/s of bleeding or thromboembolism     Previous INR was Therapeutic    Plan:     Spoke with patient spouse Neela regarding INR result and instructed:     Warfarin Dosing Instructions:  Continue current warfarin dose    5 mg every Mon, Fri; 2.5 mg all other days      (0 % change)    Instructed patient to follow up no later than: one week with home monitor.    Education provided: importance of therapeutic range    Neela verbalizes understanding and agrees to warfarin dosing plan.    Instructed to call the [facility] for any changes, questions or concerns. (#[phone])   ?   Gisele Prince RN    Subjective/Objective:      Buck Sinclair, a 75 y.o. male is on warfarin.     Buck reports:     Home warfarin dose: verbally confirmed home dose with Neela and updated on anticoagulation calendar     Missed doses: No     Medication changes:  No     S/S of bleeding or thromboembolism:  No     New Injury or illness:  No     Changes in diet or alcohol consumption:  Yes: see above.     Upcoming surgery, procedure or cardioversion:  No    Anticoagulation Episode Summary     Current INR goal:   2.0-3.0   TTR:   93.9 % (1 y)   Next INR check:   8/11/2020   INR from last check:   2.40 (8/4/2020)   Weekly max warfarin dose:      Target end date:      INR check location:      Preferred lab:      Send INR reminders to:   [facility]    Indications    Persistent Atrial Fibrillation [I48.91]           Comments:   home monitor talk to wife about dose Neela   [phone]            Anticoagulation Care Providers     Provider Role Specialty Phone number     Erica Hansen, CNP  Cardiology [phone]    Everett Renee MD  Cardiology [phone]

## 2021-06-10 NOTE — TELEPHONE ENCOUNTER
Received a faxed INR result for Buck Sinclair  From [facility]  INR result dated 8/18/2020 is 2.2

## 2021-06-10 NOTE — TELEPHONE ENCOUNTER
No PA needed, pharmacy was trying to process this under patient's old WC insurance.   Patient has [employer] Part D that covers this medication.  Pharmacy was called and made aware to call the help desk if they have further issues.

## 2021-06-10 NOTE — TELEPHONE ENCOUNTER
Prior Authorization Request  Who s requesting:  Pharmacy  Pharmacy Name and Location: [facility]  Medication Name: warfarin ANTICOAGULANT (COUMADIN/JANTOVEN) 2.5 MG tablet  Insurance Plan: MEDICARE  Insurance Member ID Number:  [id number]   CoverMyMeds Key: [id number]  Informed patient that prior authorizations can take up to 10 business days for response:   No  Okay to leave a detailed message: No

## 2021-06-10 NOTE — TELEPHONE ENCOUNTER
Received a faxed INR result for Buck Sinclair  From [facility]  INR result dated 8/11/2020 is 2.2

## 2021-06-10 NOTE — PROGRESS NOTES
[facility] Heart Care Note    Assessment:  Atrial atrial fibrillation dating back to 1997 with multiple external cardioversions        Atrial fibrillation is very symptomatic with generalized weakness fatigue but not so much palpitations  Chronic amiodarone was partially effective;Continued for many years ; stopped because of photosensitive and other adverse effects   Pulmonary vein isolation done on 2 occasions 2011   Post PVI Cardoversion February 12/ 2015 ; CV 10-         Cardioversion  11-        Atrial fibrillation April 25, 2017  Current antiarrhythmic; sotalol 120 milligrams twice a day   Long-standing advanced dilated cardiomyopathy dating back to 1997-now resolved        Recent stress nuclear scan; April 30 2014 showed ejection fraction 47% without ischemia        Echocardiogram 21 September 2015 showed ejection fraction equals 50%   Sinus node dysfunction   Medtronic acemaker initially implanted May 1999-dual atrial leads;         generator replacement May 2005          Removal of atrial and ventricular pacing leads with placement of ICD system 6 1 2014 and Medtronic single coil   Chronic anticoagulation with warfarin ; he had excessive skin bleeding from Eliquis   Obesity-substantial weight reduction on conscientious diet   COPD felt be related to previous  exposure   Negative obstructive sleep apnea evaluation   Hypertension  Hyperkalemia  Hyperlipidemia well controlled on statin; LDL equals 81      Plan:  Plan  for cardioversion; will need to have 3 consecutive INR> 2 so adjust warfarin to prevent subtherapeutic levels  I would recommend increasing warfarin to 5 mg 5 days a week, 2.5 mg 2 days a week  Prescription  for losartan 50 mg daily was sent to pharmacy  Continue current medications  We will discuss with Dr. Lizama whether further ablation would be indicated  Consider switching to alternative anticoagulation such as Xarelto 20 mg daily or Eliquis 5 mg twice daily; these  are not diet sensitive and are quite reliable as anticoagulants-this would make regulation of anticoagulation much easier and more effective   Large studies have shown a lower risk of major bleeding with these newer anticoagulants compared to warfarin  Although any anticoagulation will put you at higher risk for bleeding which could be minor or major  Follow-up with Lauren Foster NP in 2-3 weeks to see how we are doing on anticoagulation in preparation for cardioversion  Consider restarting amiodarone.  He did well on amiodarone for quite some time before developing photosensitivity but this was several years later          Subjective:    I had the opportunity to see.Buck Sinclair , who is a 72 y.o. male with a known history of atrial fibrillation  He has been in atrial fibrillation since April 25, 2017.  He is convinced that he feels quite poorly during atrial fibrillation has very little energy, becomes very fatigued.  He does not feel palpitations does not notice fluid retention swelling orthopnea PND  In the past she is always improved substantially following a cardioversion; his atrial fibrillation is very symptomatic from a endurance and energy standpoint  His last cardioversion was November  He has had interruption of his warfarin.  He is warfarin was stopped because of routine colonoscopy.  And he was advised to stop taking some vitamins that may have had some vitamin K.  Since that time he has had trouble regulating his INR is any recent INR was 1.3  Previously he tried some Eliquis but had some bleeding from his nose and ear that were slow to stop  He is not willing to switch back to a novel anticoagulant because of concern of bleeding  He has no chest pain  No orthopnea PND or pedal edema  He was advised to reduce his losartan from 100 mg daily to 50 mg daily because of elevated potassium  Last 2 potassiums have been less than 5    Problem List:  Patient Active Problem List   Diagnosis      Dyslipidemia     Essential hypertension     Persistent Atrial Fibrillation     Cardiomyopathy, idiopathic     History of sick sinus syndrome     ICD (implantable cardioverter-defibrillator), dual, in situ     Medical History:  Past Medical History:   Diagnosis Date     Anemia      BPH (benign prostatic hyperplasia)      COPD (chronic obstructive pulmonary disease)      Dyslipidemia, goal LDL below 100      Essential hypertension      Persistent atrial fibrillation      Skin cancer of trunk      Ventricular tachycardia      Surgical History:  Past Surgical History:   Procedure Laterality Date     CARDIAC DEFIBRILLATOR PLACEMENT       CARDIOVERSION      x20, last 2/12/15, 10/2015, 11/18/16 by Lauren Foster CNP     COLONOSCOPY N/A 4/28/2017    Procedure: COLONOSCOPY with 2 ascending polyps and 1 transverse polyp;  Surgeon: Jose Whittington MD;  Location: [facility];  Service:      FRACTURE SURGERY Left     wrist     INGUINAL HERNIA REPAIR Left 1967    while in the [employer] in [place] after 13 month in [place]     [place] ABLATE HEART DYSRHYTHM FOCUS  04/2011    Catheter Ablation Atrial Fibrillation PVI Apr 2011 (Cryo+RF-PVI + roof line + CTI line)     AZ ABLATE HEART DYSRHYTHM FOCUS  07/2011    Re-do PVI Jul 2011 (RFA-PVI + CFE + VIDYA + confirmation of CTI line)     AZ MYERS W/O FACETEC FORAMOT/DSKC 1/2 VRT SEG, CERVICAL      Laminectomy Lumbar;  Recorded: 03/09/2012;     TOTAL SHOULDER REPLACEMENT Right 03/03/2016    Dr. Abernathy of [facility]     Social History:  Social History     Social History     Marital status:      Spouse name: Neela     Number of children: N/A     Years of education: 12     Occupational History      Retired      police      [employer]     Social History Main Topics     Smoking status: Former Smoker     Types: Cigarettes     Quit date: 1/1/1968     Smokeless tobacco: Not on file     Alcohol use 1.2 oz/week     2 Cans of beer per week      Comment: 1 beer per week      Drug use: No     Sexual activity: Yes     Partners: Female     Other Topics Concern     Not on file     Social History Narrative       Review of Systems:      General: WNL  Eyes: WNL  Ears/Nose/Throat: WNL  Lungs: WNL  Heart: WNL  Stomach: WNL  Bladder: WNL  Muscle/Joints: WNL  Skin: WNL  Nervous System: WNL  Mental Health: WNL     Blood: WNL        Family History:  Family History   Problem Relation Age of Onset     Cancer Mother      leukemia     Cancer Father      bladder     Cancer Sister          Allergies:  Allergies   Allergen Reactions     Adhesive Other (See Comments)     ADHESIVE TAPE; SKIN IRRITATION     Amiodarone      ADVERSE REACTION.  Sunlight sensitivity.     Lisinopril Cough       Medications:  Current Outpatient Prescriptions   Medication Sig Dispense Refill     ACETAMINOPHEN (TYLENOL ORAL) Take 2 tablets by mouth 2 (two) times a day.       ascorbic acid (VITAMIN C) 1000 MG tablet Take 1,000 mg by mouth daily.       atorvastatin (LIPITOR) 40 MG tablet Take 40 mg by mouth daily.       budesonide-formoterol (SYMBICORT) 160-4.5 mcg/actuation inhaler Inhale 2 puffs 2 (two) times a day.       CALCIUM POLYCARBOPHIL (FIBER-LAX ORAL) Take 5 mg by mouth 2 (two) times a day.        CARTIA  mg 24 hr capsule TAKE 1 CAPSULE BY MOUTH EVERY EVENING 90 capsule 1     co-enzyme Q-10 30 mg capsule Take 100 mg by mouth daily.       DEXAMETHASONE OPHT Apply to eye daily.       furosemide (LASIX) 20 MG tablet Take 1 tablet (20 mg total) by mouth daily. 90 tablet 5     MAG 64 64 mg TbEC delayed-release tablet TAKE 1 TABLET BY MOUTH TWICE DAILY 180 tablet 2     polyethylene glycol (GLYCOLAX) 17 gram/dose powder Take 17 g by mouth daily. Can take every other day if too frequent bowel movements with daily dose. 255 g 0     sotalol (BETAPACE) 120 MG tablet TAKE 1 TABLET(120 MG) BY MOUTH TWICE DAILY 180 tablet 1     tiotropium (SPIRIVA) 18 mcg inhalation capsule Place 18 mcg into inhaler and inhale daily.        "vardenafil (LEVITRA) 10 MG tablet Take 10 mg by mouth daily as needed for erectile dysfunction.       VITAMIN D2 50,000 unit capsule Take 50,000 Units by mouth 2 (two) times a week.  3     warfarin (COUMADIN) 5 MG tablet Take 2.5 mg Sun,  and 5 mg all other days. 130 tablet 0     warfarin (COUMADIN) 5 MG tablet TAKE 1/2 TABLET BY MOUTH ON SUNDAY, WEDNESDAY AND FRIDAY AND 1 TABLET DAILY ALL OTHER DAYS 130 tablet 0     losartan (COZAAR) 50 MG tablet Take 1 tablet (50 mg total) by mouth daily. 90 tablet 5     meloxicam (MOBIC) 15 MG tablet Take 15 mg by mouth daily.       multivitamin (MULTIVITAMIN) per tablet Take 1 tablet by mouth daily.       ofloxacin (OCUFLOX) 0.3 % ophthalmic solution INT 1 GTT IN OS QID STARTING 1 DAY B SURGERY AND U UNTIL BOTTLE IS FINISHED  1     OMEGA-3/DHA/EPA/FISH OIL (FISH OIL-OMEGA-3 FATTY ACIDS) 300-1,000 mg capsule Take 2 g by mouth 2 (two) times a day.        senna-docusate (SENNOSIDES-DOCUSATE SODIUM) 8.6-50 mg tablet Take 2 tablets by mouth 2 (two) times a day.       tadalafil (CIALIS) 20 MG tablet Take 20 mg by mouth as needed.       No current facility-administered medications for this visit.          Surgical site  ICD is well-healed right subclavicular site    Device interrogation  Persistent atrial fibrillation since April 25, 2017  Ventricular rate is well controlled during atrial fibrillation  35% ventricular pacing  Lead function is satisfactory; single coil ICD lead  Battery voltage is good    Objective:   Vital signs:  /74 (Patient Site: Left Arm, Patient Position: Sitting, Cuff Size: Adult Large)  Pulse (!) 56  Resp 16  Ht 6' 3\" (1.905 m)  Wt (!) 279 lb (126.6 kg)  BMI 34.87 kg/m2      Physical Exam:      GENERAL APPEARANCE: Alert, cooperative and in no acute distress.  HEENT: No scleral icterus. No Xanthelasma. Oral mucous membranes pink and moist.  NECK: JVP  Normal cm. No Hepatojugular reflux. Thyroid not  Palpable  CHEST: clear to auscultation and " percussion  CARDIOVASCULAR: S1, S2 without murmur    Brachial, radial  pulses are intact and symmetric.   No carotid bruits noted.  ABDOMEN: Non tender. BS+. No bruits.  EXTREMITIES: No cyanosis, clubbing or edema.    Lab Results:  LIPIDS:  Lab Results   Component Value Date    CHOL 156 12/13/2016    CHOL 124 05/06/2016    CHOL 132 07/22/2015     Lab Results   Component Value Date    HDL 60 12/13/2016    HDL 47 05/06/2016    HDL 64 07/22/2015     Lab Results   Component Value Date    LDLCALC 81 12/13/2016    LDLCALC 62 05/06/2016    LDLCALC 59 07/22/2015     Lab Results   Component Value Date    TRIG 73 12/13/2016    TRIG 73 05/06/2016    TRIG 43 07/22/2015     No components found for: CHOLHDL    BMP:  Lab Results   Component Value Date    CREATININE 1.14 05/05/2017    BUN 22 05/05/2017     05/05/2017    K 4.9 05/05/2017     (H) 05/05/2017    CO2 20 (L) 05/05/2017         This note has been dictated using voice recognition software. Any grammatical or context distortions are unintentional and inherent to the software.  Everett Renee MD  [facility]  [phone]

## 2021-06-10 NOTE — PROGRESS NOTES
INR 1.7 home monitor. Pt had colonoscopy last week and will increase to 5 mg 4/28, and 5/3. Will continue current dose through out other days and retest in one week. After talking with pt and discussing history of greens/salads and medication change. Pt will  continue  with current diet and dosing of Warfarin.  Continue with moderation of Vit K and green leafy vegetables. Cautioned to call with increase bruising or bleeding. Reminded to call with medication change especially antibiotic. Call with any questions or concerns or any up coming procedures. Cautioned about using Herbal medication.  Spoke with wife about dosing.

## 2021-06-10 NOTE — TELEPHONE ENCOUNTER
ANTICOAGULATION  MANAGEMENT    Assessment     7/28 INR result of 2.2 is Therapeutic (goal INR of 2.0-3.0)          Previous INR was Therapeutic    Plan:     Left a detailed message for Neela ant Jane regarding INR result and instructed:   No call back received after multiple message left on voicemail.    Warfarin Dosing Instructions:  Continue current warfarin dose    5 mg every Mon, Fri; 2.5 mg all other days     (0 % change)  Instructed to call and update ACN if taking different doses as mentioned above    Instructed patient to follow up no later than: weekly with home monitor.    Education provided: importance of taking warfarin as instructed        Instructed to call the [facility] for any changes, questions or concerns. (#[phone])   ?   Gisele Prince RN    Subjective/Objective:      Buck Sinclair, a 75 y.o. male is on warfarin.         Anticoagulation Episode Summary     Current INR goal:   2.0-3.0   TTR:   93.9 % (1 y)   Next INR check:   8/4/2020   INR from last check:   2.20 (7/28/2020)   Weekly max warfarin dose:      Target end date:      INR check location:      Preferred lab:      Send INR reminders to:   [facility]    Indications    Persistent Atrial Fibrillation [I48.91]           Comments:   home monitor talk to wife about dose Neela   [phone]            Anticoagulation Care Providers     Provider Role Specialty Phone number    Erica Hansen CNP  Cardiology [phone]    Everett Renee MD  Cardiology [phone]

## 2021-06-10 NOTE — PROGRESS NOTES
INR 3.5 at home. Will increase greens and retest in one week for CV. After talking with pt and discussing history of greens/salads and medication change. Pt will  continue  with current diet and dosing of Warfarin.  Continue with moderation of Vit K and green leafy vegetables. Cautioned to call with increase bruising or bleeding. Reminded to call with medication change especially antibiotic. Call with any questions or concerns or any up coming procedures. Cautioned about using Herbal medication.

## 2021-06-10 NOTE — TELEPHONE ENCOUNTER
Received a faxed INR result for Buck Sinclair  From [facility]  INR result dated 8/4/2020 is 2.4         Documentation

## 2021-06-10 NOTE — TELEPHONE ENCOUNTER
ANTICOAGULATION  MANAGEMENT-Patient Home Monitoring Result    Assessment     Therapeutic INR result of 2.2 (goal INR of 2.0-3.0) received via fax from [employer] home INR monitoring company.        Previous INR was Therapeutic    Buck Sinclair last contacted by phone: 8/4/2020    Plan     Per home monitoring agreement with patient, patient was NOT contacted regarding therapeutic result today.  Patient is to continue current dose and continue to check INR with home monitor per protocol.  ?   Gisele Prince RN    Subjective/Objective:      Buck Sinclair, a 75 y.o. male  is established on warfarin using a home INR monitor.    Anticoagulation Episode Summary     Current INR goal:   2.0-3.0   TTR:   93.9 % (1 y)   Next INR check:   9/1/2020   INR from last check:   2.20 (8/25/2020)   Weekly max warfarin dose:      Target end date:      INR check location:      Preferred lab:      Send INR reminders to:   [facility] HE[facility] C[facility]    Indications    Persistent Atrial Fibrillation [I48.91]           Comments:   home monitor talk to wife about dose [facility]   [phone]            Anticoagulation Care Providers     Provider Role Specialty Phone number    Erica Hansen CNP  Cardiology [phone]    Everett Renee MD  Cardiology [phone]

## 2021-06-10 NOTE — PROGRESS NOTES
INT 2.2 will continue on current dose of Warfarin and then hold 4/24 for colonoscopy for for 4 days and retest one week  After that about may 9th. 2 days before next cataract surgery. Spoke with wife about all the instructions. After talking with pt and discussing history of greens/salads and medication change. Pt will  continue  with current diet and dosing of Warfarin.  Continue with moderation of Vit K and green leafy vegetables. Cautioned to call with increase bruising or bleeding. Reminded to call with medication change especially antibiotic. Call with any questions or concerns or any up coming procedures. Cautioned about using Herbal medication.

## 2021-06-10 NOTE — TELEPHONE ENCOUNTER
ANTICOAGULATION  MANAGEMENT-Patient Home Monitoring Result    Assessment     Therapeutic INR result of 2.2 (goal INR of 2.0-3.0) received via fax from [employer] home INR monitoring company.        Previous INR was Therapeutic    Buck Sinclair last contacted by phone: 8/4    Plan     Per home monitoring agreement with patient, patient was NOT contacted regarding therapeutic result today.  Patient is to continue current dose and continue to check INR with home monitor per protocol.  ?   Suzanna Corea RN    Subjective/Objective:      Buck Sinclair, a 75 y.o. male  is established on warfarin using a home INR monitor.    Anticoagulation Episode Summary     Current INR goal:   2.0-3.0   TTR:   93.9 % (1 y)   Next INR check:   8/18/2020   INR from last check:   2.20 (8/11/2020)   Weekly max warfarin dose:      Target end date:      INR check location:      Preferred lab:      Send INR reminders to:   [facility] CARE    Indications    Persistent Atrial Fibrillation [I48.91]           Comments:   home monitor talk to wife about dose formerly [facility]   [phone]            Anticoagulation Care Providers     Provider Role Specialty Phone number    Erica Hansen CNP  Cardiology [phone]    Everett Renee MD  Cardiology [phone]

## 2021-06-10 NOTE — PROGRESS NOTES
Per Lauren sally for 6/1 set up per [facility] is cancelled per Lauren  Pt has a device appt and AOLNZO appt 6/7  Pt doing home INR's needs 2 more if ok then will be ready for CV AFTER 6/13  Will await visit with ALONZO then get pt set up  Wife has my direct number understands and agrees to plan.

## 2021-06-11 NOTE — PROGRESS NOTES
INR 3.5. Will decrease Warfarin to 2.5 mg M,th, Sat and 5 mg all other days. Discussed dosing with wife. After talking with pt and discussing history of greens/salads and medication change. Pt will  continue  with current diet and dosing of Warfarin.  Continue with moderation of Vit K and green leafy vegetables. Cautioned to call with increase bruising or bleeding. Reminded to call with medication change especially antibiotic. Call with any questions or concerns or any up coming procedures. Cautioned about using Herbal medication.

## 2021-06-11 NOTE — PROGRESS NOTES
[facility] HEART [facility]  Arrhythmia Clinic  Deejay Lizama    Referring:      Assessment:         Persistent atrial fibrillation: The patient is approximately 6 years out from his most recent ablation procedure.  He has had progressive recurrence of his atrial fibrillation and his currently persistent despite sotalol therapy.  The patient is symptomatic.  Review of his previous ablation procedure would suggest that there may be additional targets for ablation including possible redo of his left inferior pulmonary vein, left atrial roof line, additional CFE (left and right close ).  The patient is chronically on oral anticoagulant (warfarin) but does have problems with frequent bruising, labile INR, and balance issues with risk of fall.  The patient has a LER9MP2-ZMPp score of 3 and a HAS-BLED score of 2.  He is a potential candidate for left atrial appendage occlusion.    Hypertension: The patient's blood pressure is at target on his current medical therapy    Cardiomyopathy: The patient is a much higher percentage of right ventricular pacing since she has been back in atrial fibrillation.  Currently is at 64% RV pacing compared to less than 10% when he was in sinus/atrial paced rhythm.  He has not had a recent echocardiogram.      Recommendations:    Patient will be scheduled for an echocardiogram to reassess his left ventricular function in light of the markedly increased right ventricular pacing.    The patient will be scheduled for repeat cardioversion.  This is likely to be of some short-term benefit but long-term the patient will need to decide if he wants to move forward with a repeat ablation.    The patient will be contacted once the echo information is available.  If his LV function is diminished then I would plan on discussing with Dr. Tyrone Renee the possibility of upgrade to a CRT-D device.    Long-term the patient will continue to be considered for possible left atrial appendage  occlusion.      Patient Active Problem List   Diagnosis     Dyslipidemia     Essential hypertension     Persistent Atrial Fibrillation     Cardiomyopathy, idiopathic     History of sick sinus syndrome     ICD (implantable cardioverter-defibrillator), dual, in situ     Status post catheter ablation of atrial fibrillation       Subjective:  Buck Sinclair (72 y.o. male) returns to the arrhythmia clinic for interim reevaluation of his persistent atrial fibrillation.  The patient is approximately 6 years out from his ablation procedure to treat his atrial fibrillation.  He done quite well but despite sotalol therapy of late he has had breakthrough atrial fibrillation.  This largely leads to symptomatic fatigue.  The patient feels quite rundown and not able to do his normal daily activities.  The patient does not have any palpitations.  He notes no exertional chest pain or pressure.  He reports no orthopnea PND or ankle edema.  The patient continues to be compliant with his medical regimen.  He does have problems with labile INR which is been particularly difficult following recent discontinuation prior to a scheduled endoscopy.  The patient has frequent bruising.  He also notes that his balance is somewhat more problematic and although he has not had a fall he does have to catch himself to avoid falling at times.    Current Outpatient Prescriptions   Medication Sig Dispense Refill     ACETAMINOPHEN (TYLENOL ORAL) Take 2 tablets by mouth 2 (two) times a day.       ascorbic acid (VITAMIN C) 1000 MG tablet Take 1,000 mg by mouth daily.       atorvastatin (LIPITOR) 40 MG tablet Take 40 mg by mouth daily.       budesonide-formoterol (SYMBICORT) 160-4.5 mcg/actuation inhaler Inhale 2 puffs 2 (two) times a day.       CALCIUM POLYCARBOPHIL (FIBER-LAX ORAL) Take 5 mg by mouth 2 (two) times a day.        CARTIA  mg 24 hr capsule TAKE 1 CAPSULE BY MOUTH EVERY EVENING 90 capsule 1     co-enzyme Q-10 30 mg capsule Take 100  mg by mouth daily.       DEXAMETHASONE OPHT Apply to eye daily.       furosemide (LASIX) 20 MG tablet Take 1 tablet (20 mg total) by mouth daily. 90 tablet 5     losartan (COZAAR) 50 MG tablet Take 1 tablet (50 mg total) by mouth daily. 90 tablet 5     MAG 64 64 mg TbEC delayed-release tablet TAKE 1 TABLET BY MOUTH TWICE DAILY 180 tablet 2     multivitamin (MULTIVITAMIN) per tablet Take 1 tablet by mouth daily.       ofloxacin (OCUFLOX) 0.3 % ophthalmic solution INT 1 GTT IN OS QID STARTING 1 DAY B SURGERY AND U UNTIL BOTTLE IS FINISHED  1     OMEGA-3/DHA/EPA/FISH OIL (FISH OIL-OMEGA-3 FATTY ACIDS) 300-1,000 mg capsule Take 2 g by mouth 2 (two) times a day.        polyethylene glycol (GLYCOLAX) 17 gram/dose powder Take 17 g by mouth daily. Can take every other day if too frequent bowel movements with daily dose. 255 g 0     sotalol (BETAPACE) 120 MG tablet TAKE 1 TABLET(120 MG) BY MOUTH TWICE DAILY 180 tablet 1     tiotropium (SPIRIVA) 18 mcg inhalation capsule Place 18 mcg into inhaler and inhale daily.       VITAMIN D2 50,000 unit capsule Take 50,000 Units by mouth 2 (two) times a week.  3     warfarin (COUMADIN) 5 MG tablet Take 2.5 mg Sun,  and 5 mg all other days. 130 tablet 0     warfarin (COUMADIN) 5 MG tablet TAKE 1/2 TABLET BY MOUTH ON SUNDAY, WEDNESDAY AND FRIDAY AND 1 TABLET DAILY ALL OTHER DAYS 130 tablet 0     CARTIA  mg 24 hr capsule TAKE 1 CAPSULE BY MOUTH EVERY EVENING 90 capsule 1     meloxicam (MOBIC) 15 MG tablet Take 15 mg by mouth daily.       senna-docusate (SENNOSIDES-DOCUSATE SODIUM) 8.6-50 mg tablet Take 2 tablets by mouth 2 (two) times a day.       tadalafil (CIALIS) 20 MG tablet Take 20 mg by mouth as needed.       vardenafil (LEVITRA) 10 MG tablet Take 10 mg by mouth daily as needed for erectile dysfunction.       No current facility-administered medications for this visit.        Review of Systems:   General: WNL  Eyes: WNL  Ears/Nose/Throat: WNL  Lungs: WNL  Heart: WNL  Stomach:  "WNL  Bladder: WNL  Muscle/Joints: WNL  Skin: WNL  Nervous System: WNL  Mental Health: WNL     Blood: WNL    Family History  Family History   Problem Relation Age of Onset     Cancer Mother      leukemia     Cancer Father      bladder     Cancer Sister        Social History   reports that he quit smoking about 49 years ago. His smoking use included Cigarettes. He does not have any smokeless tobacco history on file. He reports that he drinks about 1.2 oz of alcohol per week  He reports that he does not use illicit drugs.    Objective:   Vital signs in last 24 hours:  /80 (Patient Site: Right Arm, Patient Position: Sitting, Cuff Size: Adult Large)  Pulse 68  Resp 16  Ht 6' 3.5\" (1.918 m)  Wt (!) 279 lb (126.6 kg)  BMI 34.41 kg/m2  Weight: Weight: (!) 279 lb (126.6 kg)     Physical Exam:  General: The patient is alert oriented to person place and situation.  The patient is in no acute distress at the time of my evaluation.  Eyes: Pupils are equal, round, and reactive to light.  Conjunctiva and sclera are clear.  ENT: Oral mucosa is moist and without redness. No evident nasal discharge.  Pulmonary: Lungs are clear bilaterally with no rales, rhonchi, or wheezes.    Cardiovascular exam: Rhythm is regular. S1 and S2 are normal. No significant murmur is present. JVP is mildly elevated.  Lower extremities demonstrate no significant edema. Distal pulses are intact bilaterally.  Abdomen is obese, soft, and nontender.  Musculoskeletal: Spine is straight. Extremities without deformity.  Neuro: Gait is apparently stable but somewhat slow and careful.     Skin is warm, dry, and otherwise intact.      Cardiographics:   Interrogation of the patient's dual-chamber ICD system demonstrates normal battery monitoring voltage.  The patient's right atrial and right ventricular lead status are stable and normal.  Device diagnostics show ongoing atrial fibrillation since April 25.  Ventricular response is controlled however this is " led to a 64% right ventricular pacing percentage.  No ventricular arrhythmias.  Opti-Vol measures are mildly elevated.    Lab Results:   Lab Results   Component Value Date     05/05/2017    K 4.9 05/05/2017     (H) 05/05/2017    CO2 20 (L) 05/05/2017    BUN 22 05/05/2017    CREATININE 1.14 05/05/2017    CALCIUM 9.0 05/05/2017     Lab Results   Component Value Date    WBC 5.7 07/12/2011    HGB 14.5 07/12/2011    HCT 42.6 07/12/2011    MCV 93 07/12/2011     07/12/2011     Lab Results   Component Value Date    INR 3.5 06/06/2017    INR 3.70 (!) 05/23/2017    INR 2.20 (!) 11/18/2016         Outside record review:

## 2021-06-11 NOTE — TELEPHONE ENCOUNTER
ANTICOAGULATION  MANAGEMENT-Patient Home Monitoring Result    Assessment     Therapeutic INR result of 2.3 (goal INR of 2.0-3.0) received via fax from [employer] home INR monitoring company.        Previous INR was Therapeutic    Buck Sinclair last contacted by phone: 8/4/2020    Plan     Per home monitoring agreement with patient, patient was NOT contacted regarding therapeutic result today.  Patient is to continue current dose and continue to check INR with home monitor per protocol.  ?   Gisele Prince RN    Subjective/Objective:      Buck Sinclair, a 75 y.o. male  is established on warfarin using a home INR monitor.    Anticoagulation Episode Summary     Current INR goal:   2.0-3.0   TTR:   93.9 % (1 y)   Next INR check:   9/22/2020   INR from last check:   2.30 (9/15/2020)   Weekly max warfarin dose:      Target end date:      INR check location:      Preferred lab:      Send INR reminders to:   [facility] CARE    Indications    Persistent Atrial Fibrillation [I48.91]           Comments:   home monitor talk to wife about dose [facility]   [phone]            Anticoagulation Care Providers     Provider Role Specialty Phone number    Erica Hansen CNP  Cardiology [phone]    Everett Renee MD  Cardiology [phone]

## 2021-06-11 NOTE — PROGRESS NOTES
INR at home is 3.1 will decrease dose to 5 mg Sunday and Friday and 2.5 mg all other days. After talking with pt and discussing history of greens/salads and medication change. Pt will  continue  with current diet and dosing of Warfarin.  Continue with moderation of Vit K and green leafy vegetables. Cautioned to call with increase bruising or bleeding. Reminded to call with medication change especially antibiotic. Call with any questions or concerns or any up coming procedures. Cautioned about using Herbal medication.

## 2021-06-11 NOTE — ANESTHESIA PREPROCEDURE EVALUATION
Anesthesia Evaluation      Patient summary reviewed   No history of anesthetic complications     Airway   Mallampati: II  Neck ROM: full   Pulmonary - normal exam    breath sounds clear to auscultation  (+) COPD, a smoker (Former)                         Cardiovascular   (+) pacemaker (ICD, Sick sinus syndrome), hypertension, dysrhythmias (A.fib, V.tach), cardiomyopathy,     (-) murmur  ECG reviewed  Rhythm: irregular  Rate: abnormal,    no murmur      Neuro/Psych      Endo/Other    (+) obesity (BMI 35),      Comments: Anemia    GI/Hepatic/Renal    (-) GERD     Other findings: Echo 9/2015  Summary   Technically difficult examination.   Mildly dilated left ventricle.   Mild eccentric left ventricular hypertrophy is present.   Overall left ventricular systolic function is mildly depressed.   Left ventricular ejection fraction is visually estimated to be 50%.   When compared to the previous echocardiogram dated 4/16/15 , LVEF is   higher.      Dental - normal exam                        Anesthesia Plan  Planned anesthetic: general mask    ASA 4   Induction: intravenous   Anesthetic plan and risks discussed with: patient    Post-op plan: routine recovery

## 2021-06-11 NOTE — PROGRESS NOTES
Will increase Warfarin for INR of 1.9. Warfarin 5 mg Sun, Wed and Friday. 2.5 mg all other days and retest in one week.

## 2021-06-11 NOTE — PROGRESS NOTES
In clinic device check with Lauren Foster RN CNP.  Please see link for full device report.  Patient was informed of results and next follow up during today's visit.

## 2021-06-11 NOTE — PROGRESS NOTES
[facility]    Assessment/Plan:      Problem List Items Addressed This Visit     Essential hypertension (Chronic)    Persistent Atrial Fibrillation - Primary (Chronic)    Cardiomyopathy, idiopathic (Chronic)    History of sick sinus syndrome (Chronic)    ICD (implantable cardioverter-defibrillator), dual, in situ    Status post catheter ablation of atrial fibrillation        1.  Persistent atrial fibrillation: No significant recurrence since cardioversion on 6/16/2017.  He reports symptomatic improvement following cardioversion.  We had a long discussion of treatment options including continuing with antiarrhythmic medications, though options are very limited, versus repeat ablation.  We further discussed the ablation procedure, risks, expected recovery, and follow-up.  He was encouraged to go home and discuss options with his wife.  For the time being, continue sotalol 120 mg twice a day.       He has a [id number]-[id number] score of 2 for age 65-74 and hypertension; continue warfarin for stroke prophylaxis.    2.  Sick sinus syndrome status post pacemaker implant with subsequent upgrade to ICD for idiopathic cardiomyopathy: Appears well compensated with no signs of acute fluid overload.  Recent echocardiogram shows no decline in left ventricular systolic performance, EF remains at 50%.  The ICD was interrogated and is functioning appropriately.  The battery shows estimated longevity of 6 years.  Atrial and ventricular leads are stable and within normal limits.      3.  Hypertension: Blood pressure at target today.    Follow-up with Dr. Barrow in 2 months.  Follow-up with Dr. Renee in 6 months.    Subjective:      Buck Sinclair, a pleasant 72-year-old gentleman, comes in today accompanied by his wife for device and arrhythmia follow-up.  He has a long history of atrial fibrillation and flutter for which he underwent complex left and right atrial ablations in April 2011 and a redo in July 2011 with  recurrence of atrial fibrillation after the last ablation.  Previously, he has failed all antiarrhythmic medications and is intolerant of amiodarone, however has been fairly well suppressed with sotalol 120 mg twice a day.  He reports having, in total, over 20 cardioversions, the last on 6/16/17.  Symptoms typically consist of progressive fatigue and dyspnea on exertion and fluid retention with persistent episodes, though he is asymptomatic with shorter episodes.  He remains on long-term anticoagulation for a DQD2BH4-BAUg score of 2 for age and hypertension; he was taking Xarelto, but this was discontinued due to a concern for interaction with his meloxicam.  He has been on warfarin since 9/16/15 which he does through home monitoring.  Left atrial appendage occlusion with the Watchman device has also been discussed as a possible future option, though he does not currently meet implant criteria.     He has a history of hypertension, hyperlipidemia, and sinus bradycardia for which he initially underwent pacemaker implant on 5/18/99 with a dual site atrial pacing system.  He underwent generator replacement on 5/23/05 at which time one of the atrial leads was noted to have an insulation fracture that was repaired with a silicone sleeve, but ultimately capped and abandoned.  Due to advanced dilated cardiomyopathy with an EF of 35%, he underwent lead extraction the right atrial and ventricular pacing leads to allow for an upgrade to an ICD system on 6/11/14.  Echocardiogram performed 6/19/17 shows normalization of his left ventricular systolic performance with an EF of 50%.   He underwent a sleep study on 5/11/15 that did not show significant obstructive sleep apnea/hypopnea sufficient to disturb sleep.    Chente reports some somatic improvement since cardioversion and no recurrence of arrhythmia.  He denies chest discomfort, palpitations, abdominal bloating/fullness or peripheral edema, shortness of breath, paroxysmal  nocturnal dyspnea, orthopnea, lightheadedness, dizziness, pre-syncope, or syncope.      Medical, surgical, family, social history, and medications were reviewed and updated as necessary.    Patient Active Problem List   Diagnosis     Dyslipidemia     Essential hypertension     Persistent Atrial Fibrillation     Cardiomyopathy, idiopathic     History of sick sinus syndrome     ICD (implantable cardioverter-defibrillator), dual, in situ     Status post catheter ablation of atrial fibrillation       Past Medical History:   Diagnosis Date     Anemia      BPH (benign prostatic hyperplasia)      COPD (chronic obstructive pulmonary disease)      Dyslipidemia, goal LDL below 100      Essential hypertension      Persistent atrial fibrillation      Skin cancer of trunk      Status post catheter ablation of atrial fibrillation 6/7/2017    PVI 4-2011 (Cryo/PVI + roof line + CTI line) Re-do PVI 7-2011 (RFA/PVI + CFE + VIDYA + confirmed CTI line)     Ventricular tachycardia        Past Surgical History:   Procedure Laterality Date     CARDIAC DEFIBRILLATOR PLACEMENT       CARDIOVERSION      x20, last 2/12/15, 10/2015, 11/18/16, 6/16/17 by Lauren Fsoter CNP     COLONOSCOPY N/A 4/28/2017    Procedure: COLONOSCOPY with 2 ascending polyps and 1 transverse polyp;  Surgeon: Jose Whittington MD;  Location: [facility];  Service:      FRACTURE SURGERY Left     wrist     INGUINAL HERNIA REPAIR Left 1967    while in the [employer] in [place] after 13 month in [place]     NH ABLATE HEART DYSRHYTHM FOCUS  04/2011    Catheter Ablation Atrial Fibrillation PVI Apr 2011 (Cryo+RF-PVI + roof line + CTI line)     NH ABLATE HEART DYSRHYTHM FOCUS  07/2011    Re-do PVI Jul 2011 (RFA-PVI + CFE + VIDYA + confirmation of CTI line)     NH MYERS W/O FACETEC FORAMOT/DSKC 1/2 VRT SEG, CERVICAL      Laminectomy Lumbar;  Recorded: 03/09/2012;     TOTAL SHOULDER REPLACEMENT Right 03/03/2016    Dr. Abernathy of [facility]       Allergies   Allergen Reactions      Adhesive Other (See Comments)     ADHESIVE TAPE; SKIN IRRITATION     Amiodarone      ADVERSE REACTION.  Sunlight sensitivity.     Lisinopril Cough       Current Outpatient Prescriptions   Medication Sig Dispense Refill     ACETAMINOPHEN (TYLENOL ORAL) Take 2 tablets by mouth 2 (two) times a day.       ascorbic acid (VITAMIN C) 1000 MG tablet Take 1,000 mg by mouth daily.       atorvastatin (LIPITOR) 40 MG tablet Take 40 mg by mouth daily.       budesonide-formoterol (SYMBICORT) 160-4.5 mcg/actuation inhaler Inhale 2 puffs 2 (two) times a day.       CALCIUM POLYCARBOPHIL (FIBER-LAX ORAL) Take 5 mg by mouth 2 (two) times a day.        co-enzyme Q-10 30 mg capsule Take 100 mg by mouth daily.       diltiazem (CARTIA XT) 240 MG 24 hr capsule Take 1 capsule (240 mg total) by mouth daily. 90 capsule 1     furosemide (LASIX) 20 MG tablet Take 1 tablet (20 mg total) by mouth daily. 90 tablet 5     losartan (COZAAR) 50 MG tablet Take 1 tablet (50 mg total) by mouth daily. 90 tablet 5     MAG 64 64 mg TbEC delayed-release tablet TAKE 1 TABLET BY MOUTH TWICE DAILY 180 tablet 2     multivitamin (MULTIVITAMIN) per tablet Take 1 tablet by mouth daily.       OMEGA-3/DHA/EPA/FISH OIL (FISH OIL-OMEGA-3 FATTY ACIDS) 300-1,000 mg capsule Take 2 g by mouth 2 (two) times a day.        polyethylene glycol (MIRALAX) 17 gram packet Take 17 g by mouth daily.       sotalol (BETAPACE) 120 MG tablet TAKE 1 TABLET(120 MG) BY MOUTH TWICE DAILY 180 tablet 1     tiotropium (SPIRIVA) 18 mcg inhalation capsule Place 18 mcg into inhaler and inhale daily.       vardenafil (LEVITRA) 10 MG tablet Take 10 mg by mouth daily as needed for erectile dysfunction.       VITAMIN D2 50,000 unit capsule Take 50,000 Units by mouth 2 (two) times a week.  3     warfarin (COUMADIN) 5 MG tablet Take 5 mg Sun, Tues, Wed, and 2.5 mg ROW. 130 tablet 0     DEXAMETHASONE OPHT Apply to eye daily.       meloxicam (MOBIC) 15 MG tablet Take 15 mg by mouth daily.        "tadalafil (CIALIS) 20 MG tablet Take 20 mg by mouth as needed.       No current facility-administered medications for this visit.        Family History   Problem Relation Age of Onset     Cancer Mother      leukemia     Cancer Father      bladder     Cancer Sister        Social History   Substance Use Topics     Smoking status: Former Smoker     Types: Cigarettes     Quit date: 1/1/1968     Smokeless tobacco: None     Alcohol use 1.2 oz/week     2 Cans of beer per week      Comment: 1 beer per week       Review of Systems:   General: WNL  Eyes: WNL  Ears/Nose/Throat: WNL  Lungs: WNL  Heart: WNL  Stomach: WNL  Bladder: WNL  Muscle/Joints: WNL  Skin: WNL  Nervous System: WNL  Mental Health: WNL     Blood: WNL      Objective:      /80 (Patient Site: Left Arm, Patient Position: Sitting, Cuff Size: Adult Large)  Pulse 84  Resp 16  Ht 6' 3.5\" (1.918 m)  Wt (!) 274 lb (124.3 kg)  BMI 33.8 kg/m2    Physical Exam  General Appearance:  Alert, well-appearing, in no acute distress.     HEENT:  Atraumatic, normocephalic; no scleral icterus, EOM intact; the mucous membranes were pink and moist.   Chest: Chest symmetric, spine straight.  Right subclavian ICD site with no sign of infection or tissue thinning.     Lungs:   Respirations unlabored; the lungs are clear to auscultation.   Cardiovascular:   Auscultation reveals normal first and second heart sounds with no murmurs, rubs, or gallops.  Regular rate and rhythm. No JVD.  Radial and posterior tibial pulses are intact.    Abdomen:  Soft, nontender, nondistended, bowel sounds present.     Extremities: No cyanosis, clubbing, or edema.   Musculoskeletal:  Moves all extremities.     Skin: No xanthelasma.   Neurologic: Mood and affect are appropriate. Oriented to person, place, time, and situation.       Cardiographics  Post cardioversion ECG done 6/16/17 was personally reviewed, shows atrial pacing with intrinsic ventricular conduction at 88 bpm, QT/QTc interval measures " 412/498 ms    Echocardiogram: Done 6/19/17    When compared to the previous study dated 9/21/2015, no significant change.    Left ventricle ejection fraction is mildly decreased. The estimated left ventricular ejection fraction is 50%.    Left ventricular cavity is mildly increased. Mild hypertrophy noted.    Moderate biatrial enlargement.    Mild pulmonary hypertension present.    ICD Interrogation:  Pacing mode: MVP  bpm  Presenting rhythm: AP-VS 81 bpm  Underlying rhythm: SB 30s-60s, 1st degree AVB  A-paced: 94.5%.  V-paced 12.8%.  Battery: estimated longevity 6 years.  Atrial and Ventricular leads: Stable with good sensing, impedance, and pacing thresholds  Arrhythmias: 9 AT/AF, longest 7 minutes on 6/20/17, converted with atrial ATP, controlled ventricular response  Morgan: 0.3%  Histograms: Good rate distribution  Heart failure diagnostics: No indication of fluid overload.  OptiVol below threshold and thoracic impedance at reference line    Lab Review   Lab Results   Component Value Date    CREATININE 1.14 05/05/2017    BUN 22 05/05/2017     05/05/2017    K 4.5 06/16/2017     (H) 05/05/2017    CO2 20 (L) 05/05/2017     Lab Results   Component Value Date    INR 3.10 (!) 06/27/2017    INR 3.10 (!) 06/20/2017    INR 2.90 (!) 06/16/2017         Greater than than 35 minutes were spent face to face in this visit with more than 50% spent discussing diagnoses as listed above, counseling, and coordination of care.    Lauren Foster, [facility], Electrophysiology  [phone]    This note has been dictated using voice recognition software. Any grammatical, typographical, or context distortions are unintentional and inherent to the software.

## 2021-06-11 NOTE — TELEPHONE ENCOUNTER
Received a faxed INR result for Buck Sinclair  From [facility]  INR result dated 9/22/2020 is 2.4

## 2021-06-11 NOTE — TELEPHONE ENCOUNTER
Received a faxed INR result for Buck Sinclair  From [facility]    INR result dated 9/15/2020 is 2.3

## 2021-06-11 NOTE — TELEPHONE ENCOUNTER
ANTICOAGULATION  MANAGEMENT-Patient Home Monitoring Result    Assessment     Therapeutic INR result of 2.5 (goal INR of 2.0-3.0) received via fax from [employer] home INR monitoring company.        Previous INR was Therapeutic    Buck Sinclair last contacted by phone: 8/4/2020    Plan     Per home monitoring agreement with patient, patient was NOT contacted regarding therapeutic result today.  Patient is to continue current dose and continue to check INR with home monitor per protocol.  ?   Gisele Prince RN    Subjective/Objective:      Buck Sinclair, a 75 y.o. male  is established on warfarin using a home INR monitor.    Anticoagulation Episode Summary     Current INR goal:   2.0-3.0   TTR:   93.9 % (1 y)   Next INR check:   9/8/2020   INR from last check:   2.50 (9/1/2020)   Weekly max warfarin dose:      Target end date:      INR check location:      Preferred lab:      Send INR reminders to:   [facility]    Indications    Persistent Atrial Fibrillation [I48.91]           Comments:   home monitor talk to wife about dose [facility]   [phone]            Anticoagulation Care Providers     Provider Role Specialty Phone number    Erica Hansen CNP  Cardiology [phone]    Everett Renee MD  Cardiology [phone]

## 2021-06-11 NOTE — TELEPHONE ENCOUNTER
ANTICOAGULATION  MANAGEMENT-Patient Home Monitoring Result    Assessment     Therapeutic INR result of 3.0 (goal INR of 2.0-3.0) received via fax from [employer]   home INR monitoring company.        Previous INR was Therapeutic    Buck Sinclair last contacted by phone: 8/4/2020    Plan     Per home monitoring agreement with patient, patient was NOT contacted regarding therapeutic result today.  Patient is to continue current dose and continue to check INR with home monitor per protocol.  ?   Gisele Prince RN    Subjective/Objective:      Buck Sinclair, a 75 y.o. male  is established on warfarin using a home INR monitor.    Anticoagulation Episode Summary     Current INR goal:   2.0-3.0   TTR:   93.9 % (1 y)   Next INR check:   9/15/2020   INR from last check:   3.00 (9/8/2020)   Weekly max warfarin dose:      Target end date:      INR check location:      Preferred lab:      Send INR reminders to:   [facility]    Indications    Persistent Atrial Fibrillation [I48.91]           Comments:   home monitor talk to wife about dose [facility]   [phone]            Anticoagulation Care Providers     Provider Role Specialty Phone number    Erica Hansen CNP  Cardiology [phone]    Everett Rneee MD  Cardiology [phone]

## 2021-06-11 NOTE — TELEPHONE ENCOUNTER
Received a faxed INR result for Buck Sinclair  From [employer] [facility]    INR result dated 9/8/2020 is 3.0

## 2021-06-11 NOTE — TELEPHONE ENCOUNTER
ANTICOAGULATION  MANAGEMENT-Patient Home Monitoring Result    Assessment     Therapeutic INR result of 2.7 (goal INR of 2.0-3.0) received via fax from [employer] home INR monitoring company.        Previous INR was Therapeutic    Buck Sinclair last contacted by phone: 8/4/2020.    Plan     Per home monitoring agreement with patient, patient was NOT contacted regarding therapeutic result today.  Patient is to continue current dose and continue to check INR with home monitor per protocol.  ?   Neri Mace RN    Subjective/Objective:      Buck Sinclair, a 75 y.o. male  is established on warfarin using a home INR monitor.    Anticoagulation Episode Summary     Current INR goal:   2.0-3.0   TTR:   93.9 % (1 y)   Next INR check:   10/6/2020   INR from last check:   2.70 (9/29/2020)   Weekly max warfarin dose:      Target end date:      INR check location:      Preferred lab:      Send INR reminders to:   [facility]    Indications    Persistent Atrial Fibrillation [I48.91]           Comments:   home monitor talk to wife about dose [facility]   [phone]            Anticoagulation Care Providers     Provider Role Specialty Phone number    Erica Hansen CNP  Cardiology [phone]    Everett Renee MD  Cardiology [phone]

## 2021-06-11 NOTE — PROGRESS NOTES
1945  [phone] (home)  [phone] (mobile)    +++Important patient information for [facility]/Cath Lab staff : PT HAS A DUAL ICD+++    [facility] EP Cath Lab Procedure Order   Cardioversion:    Cardioversion     INR'S ARE SCANNED IN EPIC UNDER MEDIA TAB, CLEARED BY COMFORT FOSTER  6/13=3.5  6/6=3.5  5/30=3.5  5/23=3.7      Diagnosis:  AF  Anticipated Case Duration:  Standard  Scheduling Needs/Timeframe:  PT IS SET FOR FRIDAY 6/16/2017 AT 11 30    Current Device: Dual ICD  Device Company/Device Rep Needed for Procedure: [employer]    Anesthesia:  General-CV Only  Research Protocol:  No    [facility] EP Cath Lab Prep   Ordering Provider: Comfort Foster NP  Ordering Date: 6/1/2017  Orders Status: Intial order placed and Order set placed  EP NC Contact: Palma Vega LPN    H&P:  Compled by DR KENDRICK  on 6/7/2017  PCP: Vivek Guerrero MD, [phone]    Pre-op Labs: N/A for procedure    Medical Records Pertinent for Procedure:  N/A    Patient Education:    PT HAS A  FOR PROCEDURE  ALL INSTRUCTIONS REVIEWED WITH WIFE  PT INSTRUCTED TO HOLD ANY VITAMINS, MINERALS, CALCIUM, IRON OR SUPPLEMENTS THE MORNING OF CV  PT IS TO HAVE  FOLLOW UP WITH COMFORT IN 3-4 WEEKS AND  NOTIFIED.  PT HAS DUAL ICD/MEDTRONIC.  PT'S INR'S IN EPIC UNDER MEDIA TAB.        Teach with Patient: Completed via phone on 6/13/2017    Risks Reviewed:     Cardioversion    >90% acute success rate, <10% failure to convert or   reverts shortly after cardioversion.    <1% embolic event of (CVA, pulmonary embolism, or   other site).    75% risk for superficial burn.  Risks associated with general anesthesia will be addressed by the Anesthesiology Department    Consent: Will be obtained in [facility] day of procedure    Pre-Procedure Instructions that were Reviewed with Patient:  NPO after midnight, Notified patient of time and date of procedure by CV , Transportation arrangements needed s/p procedure, Post-procedure follow up process and Sedation  plan/orders    Pre-Procedure Medication Instructions:  Instructions given to pt regarding anticoagulants: Coumadin- instructed to continue anticoagulation uninterrupted through their procedure  Instructions given to pt regarding antiarrhythmic medication: N/A; N/A  Instructions for medication, other than anticoagulants/antiarrhythmics listed above, given to pt: to take all morning medications with small sips of water, with the exception of OTC supplements and MVI    Allergies   Allergen Reactions     Adhesive Other (See Comments)     ADHESIVE TAPE; SKIN IRRITATION     Amiodarone      ADVERSE REACTION.  Sunlight sensitivity.     Lisinopril Cough       Current Outpatient Prescriptions:      ACETAMINOPHEN (TYLENOL ORAL), Take 2 tablets by mouth 2 (two) times a day., Disp: , Rfl:      ascorbic acid (VITAMIN C) 1000 MG tablet, Take 1,000 mg by mouth daily., Disp: , Rfl:      atorvastatin (LIPITOR) 40 MG tablet, Take 40 mg by mouth daily., Disp: , Rfl:      budesonide-formoterol (SYMBICORT) 160-4.5 mcg/actuation inhaler, Inhale 2 puffs 2 (two) times a day., Disp: , Rfl:      CALCIUM POLYCARBOPHIL (FIBER-LAX ORAL), Take 5 mg by mouth 2 (two) times a day. , Disp: , Rfl:      CARTIA  mg 24 hr capsule, TAKE 1 CAPSULE BY MOUTH EVERY EVENING, Disp: 90 capsule, Rfl: 1     CARTIA  mg 24 hr capsule, TAKE 1 CAPSULE BY MOUTH EVERY EVENING, Disp: 90 capsule, Rfl: 1     co-enzyme Q-10 30 mg capsule, Take 100 mg by mouth daily., Disp: , Rfl:      DEXAMETHASONE OPHT, Apply to eye daily., Disp: , Rfl:      furosemide (LASIX) 20 MG tablet, Take 1 tablet (20 mg total) by mouth daily., Disp: 90 tablet, Rfl: 5     losartan (COZAAR) 50 MG tablet, Take 1 tablet (50 mg total) by mouth daily., Disp: 90 tablet, Rfl: 5     MAG 64 64 mg TbEC delayed-release tablet, TAKE 1 TABLET BY MOUTH TWICE DAILY, Disp: 180 tablet, Rfl: 2     meloxicam (MOBIC) 15 MG tablet, Take 15 mg by mouth daily., Disp: , Rfl:      multivitamin (MULTIVITAMIN) per  tablet, Take 1 tablet by mouth daily., Disp: , Rfl:      ofloxacin (OCUFLOX) 0.3 % ophthalmic solution, INT 1 GTT IN OS QID STARTING 1 DAY B SURGERY AND U UNTIL BOTTLE IS FINISHED, Disp: , Rfl: 1     OMEGA-3/DHA/EPA/FISH OIL (FISH OIL-OMEGA-3 FATTY ACIDS) 300-1,000 mg capsule, Take 2 g by mouth 2 (two) times a day. , Disp: , Rfl:      polyethylene glycol (GLYCOLAX) 17 gram/dose powder, Take 17 g by mouth daily. Can take every other day if too frequent bowel movements with daily dose., Disp: 255 g, Rfl: 0     senna-docusate (SENNOSIDES-DOCUSATE SODIUM) 8.6-50 mg tablet, Take 2 tablets by mouth 2 (two) times a day., Disp: , Rfl:      sotalol (BETAPACE) 120 MG tablet, TAKE 1 TABLET(120 MG) BY MOUTH TWICE DAILY, Disp: 180 tablet, Rfl: 1     tadalafil (CIALIS) 20 MG tablet, Take 20 mg by mouth as needed., Disp: , Rfl:      tiotropium (SPIRIVA) 18 mcg inhalation capsule, Place 18 mcg into inhaler and inhale daily., Disp: , Rfl:      vardenafil (LEVITRA) 10 MG tablet, Take 10 mg by mouth daily as needed for erectile dysfunction., Disp: , Rfl:      VITAMIN D2 50,000 unit capsule, Take 50,000 Units by mouth 2 (two) times a week., Disp: , Rfl: 3     warfarin (COUMADIN) 5 MG tablet, Take 2.5 mg Sun,  and 5 mg all other days., Disp: 130 tablet, Rfl: 0     warfarin (COUMADIN) 5 MG tablet, TAKE 1/2 TABLET BY MOUTH ON SUNDAY, WEDNESDAY AND FRIDAY AND 1 TABLET DAILY ALL OTHER DAYS, Disp: 130 tablet, Rfl: 0

## 2021-06-11 NOTE — TELEPHONE ENCOUNTER
Received a faxed INR result for Buck Sinclair  From [facility]    INR result dated 9/1/2020 is 2.5

## 2021-06-11 NOTE — ANESTHESIA CARE TRANSFER NOTE
Last vitals:   Vitals:    06/16/17 1401   BP: 179/84   Pulse: 87   Resp: 16   Temp:    SpO2: 100%     Patient's level of consciousness is awake and drowsy  Spontaneous respirations: yes  Maintains airway independently: yes  Dentition unchanged: yes  Oropharynx: oropharynx clear of all foreign objects    QCDR Measures:  ASA# 20 - Surgical Safety Checklist: ASA20A - Safety Checks Done  PQRS# 430 - Adult PONV Prevention: NA - Not adult patient, not GA or 3 or more risk factors NOT present  ASA# 8 - Peds PONV Prevention: NA - Not pediatric patient, not GA or 2 or more risk factors NOT present  PQRS# 424 - Geovanna-op Temp Management: NA - MAC anesthesia or case < 60 minutes  PQRS# 426 - PACU Transfer Protocol: - Transfer of care checklist used  ASA# 14 - Acute Post-op Pain: NA - Patient under age 10y or did not go to PACU

## 2021-06-11 NOTE — PROGRESS NOTES
INR at home was 3.1 will decrease another 2.5 mg on tue. Spoke with wife. Will retest in one week. After talking with pt and discussing history of greens/salads and medication change. Pt will  continue  with current diet and dosing of Warfarin.  Continue with moderation of Vit K and green leafy vegetables. Cautioned to call with increase bruising or bleeding. Reminded to call with medication change especially antibiotic. Call with any questions or concerns or any up coming procedures. Cautioned about using Herbal medication.

## 2021-06-11 NOTE — PROGRESS NOTES
Pt for CV on Friday. Will decrease dose then and retest in one week. INR 3.5 today. After talking with pt and discussing history of greens/salads and medication change. Pt will  continue  with current diet and dosing of Warfarin.  Continue with moderation of Vit K and green leafy vegetables. Cautioned to call with increase bruising or bleeding. Reminded to call with medication change especially antibiotic. Call with any questions or concerns or any up coming procedures. Cautioned about using Herbal medication.

## 2021-06-11 NOTE — ANESTHESIA POSTPROCEDURE EVALUATION
Patient: Buck Sinclair  * No procedures listed *  Anesthesia type: general    Patient location: PACU  Last vitals:   Vitals:    06/16/17 1409   BP: 127/86   Pulse: 82   Resp: 16   Temp:    SpO2:      Post vital signs: stable  Level of consciousness: awake and responds to simple questions  Post-anesthesia pain: pain controlled  Post-anesthesia nausea and vomiting: no  Pulmonary: unassisted, return to baseline  Cardiovascular: stable and blood pressure at baseline  Hydration: adequate  Anesthetic events: no    QCDR Measures:  ASA# 11 - Geovanna-op Cardiac Arrest: ASA11B - Patient did NOT experience unanticipated cardiac arrest  ASA# 12 - Geovanna-op Mortality Rate: ASA12B - Patient did NOT die  ASA# 13 - PACU Re-Intubation Rate: NA - No ETT / LMA used for case  ASA# 10 - Composite Anes Safety: ASA10A - No serious adverse event  ASA# 38 - New Corneal Injury: ASA38A - No new exposure keratitis or corneal abrasion in PACU    Additional Notes:

## 2021-06-11 NOTE — TELEPHONE ENCOUNTER
ANTICOAGULATION  MANAGEMENT-Patient Home Monitoring Result    Assessment     Therapeutic INR result of 2.4 (goal INR of 2.0-3.0) received via fax from [email or web address] home INR monitoring company.        Previous INR was Therapeutic    Buck Sinclair last contacted by phone: 8/4/2020    Plan     Per home monitoring agreement with patient, patient was NOT contacted regarding therapeutic result today.  Patient is to continue current dose and continue to check INR with home monitor per protocol.  ?   Gisele rPince RN    Subjective/Objective:      Buck Sinclair, a 75 y.o. male  is established on warfarin using a home INR monitor.    Anticoagulation Episode Summary     Current INR goal:   2.0-3.0   TTR:   93.9 % (1 y)   Next INR check:   9/29/2020   INR from last check:   2.40 (9/22/2020)   Weekly max warfarin dose:      Target end date:      INR check location:      Preferred lab:      Send INR reminders to:   [facility]    Indications    Persistent Atrial Fibrillation [I48.91]           Comments:   home monitor talk to wife about dose [facility]   [phone]            Anticoagulation Care Providers     Provider Role Specialty Phone number    Erica Hansen CNP  Cardiology [phone]    Everett Renee MD  Cardiology [phone]

## 2021-06-11 NOTE — PROGRESS NOTES
In clinic device check with Dr. Lizama.  Please see link for full device report.  Patient was informed of results and next follow up during today's visit.

## 2021-06-12 NOTE — PROGRESS NOTES
[facility] HEART [facility]  Arrhythmia Clinic  Deejay Lizama    Referring:      Assessment:         Persistent atrial fibrillation: The patient is approximately 6 years out from his ablation procedures to treat his atrial fibrillation.  He had recurrence including roughly 2 months of atrial fibrillation earlier this spring which required cardioversion.  Since that time the patient is had one longer episode of self terminating atrial fibrillation.  Overall the patient notes that he is not able to appreciate much in the way of pulse irregularity as he had back when he was first diagnosed.  On arriving today he thought he was in atrial fibrillation because of his fatigue and dyspnea on exertion.  In fact his atrial fibrillation burden is been extremely low since his cardioversion.  This makes me think that his symptoms may not be directly related to his A. fib and loss of AV synchrony (see below).  The patient remains on oral anticoagulant therapy (warfarin).  He has frequent and sometimes significant bruising.  He also continues to have issues with balance but does continue to do projects including getting up on ladders and stepstools to perform household repairs.  He is at increased risk for falls and complications secondary to his oral anticoagulant therapy.  His [id number] score is currently 2.    Dyspnea on exertion: The patient's dyspnea on exertion may be secondary to coronary artery disease.  Previous stress testing performed in 2014 did not demonstrate any ischemic defect.  At this point I do think it would be advisable to have the patient undergo repeat stress testing to rule out significant ischemia.  Both of his brothers have recently had coronary events.    Hypertension: The patient's blood pressure is at target on his current medical therapy.      Recommendations:    No change in the patient's device programming today.    No change in the patient's current medications    Patient will be scheduled  for a pharmacologic nuclear stress test imaging.    Follow-up in the device clinic with the EP nurse practitioner in 3 months to reassess the patient's arrhythmia burden and symptoms.      Patient Active Problem List   Diagnosis     Dyslipidemia     Essential hypertension     Persistent Atrial Fibrillation     Cardiomyopathy, idiopathic     History of sick sinus syndrome     ICD (implantable cardioverter-defibrillator), dual, in situ     Status post catheter ablation of atrial fibrillation       Subjective:  Buck Sinclair (72 y.o. male) returns to the arrhythmia clinic for interval reevaluation of the patient's atrial dysrhythmias post ablation.  The patient is roughly 6 years out from his ablation procedures to treat his atrial fibrillation.  The patient did have recurrent persistent atrial fibrillation requiring cardioversion earlier this spring.  The patient notes that he is not been able to feel much in the way of irregularity was heartbeat which was a prominent feature to his symptoms in the past.  He does note that he feels somewhat winded with exertion and somewhat more fatigued.  In fact he thought he was in atrial fibrillation when he presented to the clinic today.  On my interview the patient had Sammy been told that he was not in atrial fibrillation and he was somewhat surprised.  He reports no chest pain with his exertional dyspnea.  He is not reporting any orthopnea PND or ankle edema.    Current Outpatient Prescriptions   Medication Sig Dispense Refill     ACETAMINOPHEN (TYLENOL ORAL) Take 2 tablets by mouth 2 (two) times a day.       ascorbic acid (VITAMIN C) 1000 MG tablet Take 1,000 mg by mouth daily.       atorvastatin (LIPITOR) 40 MG tablet Take 40 mg by mouth daily.       budesonide-formoterol (SYMBICORT) 160-4.5 mcg/actuation inhaler Inhale 2 puffs 2 (two) times a day.       CALCIUM POLYCARBOPHIL (FIBER-LAX ORAL) Take 5 mg by mouth 2 (two) times a day.        co-enzyme Q-10 30 mg capsule  Take 100 mg by mouth daily.       DEXAMETHASONE OPHT Apply to eye daily.       diltiazem (CARTIA XT) 240 MG 24 hr capsule Take 1 capsule (240 mg total) by mouth daily. 90 capsule 1     furosemide (LASIX) 20 MG tablet Take 1 tablet (20 mg total) by mouth daily. 90 tablet 5     MAG 64 64 mg TbEC delayed-release tablet TAKE 1 TABLET BY MOUTH TWICE DAILY 180 tablet 2     multivitamin (MULTIVITAMIN) per tablet Take 1 tablet by mouth daily.       OMEGA-3/DHA/EPA/FISH OIL (FISH OIL-OMEGA-3 FATTY ACIDS) 300-1,000 mg capsule Take 2 g by mouth 2 (two) times a day.        polyethylene glycol (MIRALAX) 17 gram packet Take 17 g by mouth daily.       sotalol (BETAPACE) 120 MG tablet TAKE 1 TABLET(120 MG) BY MOUTH TWICE DAILY 180 tablet 1     tadalafil (CIALIS) 20 MG tablet Take 20 mg by mouth as needed.       tiotropium (SPIRIVA) 18 mcg inhalation capsule Place 18 mcg into inhaler and inhale daily.       vardenafil (LEVITRA) 10 MG tablet Take 10 mg by mouth daily as needed for erectile dysfunction.       VITAMIN D2 50,000 unit capsule Take 50,000 Units by mouth 2 (two) times a week.  3     warfarin (COUMADIN) 5 MG tablet Take 5 mg Sun, Tues, Wed, and 2.5 mg ROW. 130 tablet 0     warfarin (COUMADIN) 5 MG tablet Take 5 mg SUN, WED, FRI and 2.5 mg all other days 130 tablet 0     losartan (COZAAR) 50 MG tablet Take 1 tablet (50 mg total) by mouth daily. 90 tablet 5     meloxicam (MOBIC) 15 MG tablet Take 15 mg by mouth daily.       No current facility-administered medications for this visit.        Review of Systems:   General: WNL  Eyes: WNL  Ears/Nose/Throat: WNL  Lungs: WNL  Heart: Shortness of Breath with activity, Irregular Heartbeat  Stomach: WNL  Bladder: WNL  Muscle/Joints: WNL  Skin: WNL  Nervous System: WNL  Mental Health: WNL     Blood: WNL    Family History  Family History   Problem Relation Age of Onset     Cancer Mother      leukemia     Cancer Father      bladder     Cancer Sister        Social History   reports that  "he quit smoking about 49 years ago. His smoking use included Cigarettes. He has never used smokeless tobacco. He reports that he drinks about 1.2 oz of alcohol per week  He reports that he does not use illicit drugs.    Objective:   Vital signs in last 24 hours:  /70 (Patient Site: Left Arm, Patient Position: Sitting, Cuff Size: Adult Large)  Pulse 64  Resp 18  Ht 6' 3.5\" (1.918 m)  Wt (!) 281 lb (127.5 kg)  BMI 34.66 kg/m2  Weight: Weight: (!) 281 lb (127.5 kg)     Physical Exam:  General: The patient is alert oriented to person place and situation.  The patient is in no acute distress at the time of my evaluation.  Eyes: Pupils are equal, round, and reactive to light.  Conjunctiva and sclera are clear.  ENT: Oral mucosa is moist and without redness. No evident nasal discharge.  Pulmonary: Lungs are clear bilaterally with no rales, rhonchi, or wheezes.    Cardiovascular exam: Rhythm is regular. S1 and S2 are normal. No significant murmur is present. JVP is normal. Lower extremities demonstrate no significant edema. Distal pulses are intact bilaterally.  Abdomen is obese, soft, and nontender.  Musculoskeletal: Spine is straight. Extremities without deformity.  Neuro: Gait is normal.   Skin is warm, dry, and otherwise intact.      Cardiographics:   Interrogation of the patient's dual-chamber ICD system demonstrates normal battery monitoring voltage.  The patient's right atrial right ventricular lead status are stable and normal.  Device diagnostics show 94% right atrial pacing and a 0.5% right ventricular pacing.  A. fib burden is 4.3%.  The patient has had one episode of roughly 65 hours since his cardioversion.  Otherwise the patient is maintained sinus rhythm and his overall burden is 4.3%.  No sustained ventricular arrhythmia.    Lab Results:   Lab Results   Component Value Date     09/07/2017    K 4.8 09/07/2017     09/07/2017    CO2 23 09/07/2017    BUN 21 09/07/2017    CREATININE 1.07 " 09/07/2017    CALCIUM 9.2 09/07/2017     Lab Results   Component Value Date    WBC 5.7 07/12/2011    HGB 14.5 07/12/2011    HCT 42.6 07/12/2011    MCV 93 07/12/2011     07/12/2011     Lab Results   Component Value Date    INR 3.00 (!) 09/05/2017    INR 2.90 (!) 06/16/2017         Outside record review:

## 2021-06-12 NOTE — PROGRESS NOTES
In clinic device check with Dr. Renee.  Please see link for full device report.  Patient was informed of results and next follow up during today's visit.

## 2021-06-12 NOTE — TELEPHONE ENCOUNTER
Received a faxed INR result for Buck Sinclair  From [employer] [facility]    INR result dated 11/3/2020 is 2.2

## 2021-06-12 NOTE — TELEPHONE ENCOUNTER
ANTICOAGULATION  MANAGEMENT-Patient Home Monitoring Result    Assessment     Therapeutic INR result of 2.2 (goal INR of 2.0-3.0) received via fax from [employer] home INR monitoring company.        Previous INR was Therapeutic    Buck Sinclair last contacted by phone: 10/27    Plan     Per home monitoring agreement with patient, patient was NOT contacted regarding therapeutic result today.  Patient is to continue current dose and continue to check INR with home monitor per protocol.  ?   Gisele Prince RN    Subjective/Objective:      Buck Sinclair, a 75 y.o. male  is established on warfarin using a home INR monitor.    Anticoagulation Episode Summary     Current INR goal:  2.0-3.0   TTR:  94.6 % (1 y)   Next INR check:  11/10/2020   INR from last check:  2.20 (11/3/2020)   Weekly max warfarin dose:     Target end date:     INR check location:     Preferred lab:     Send INR reminders to:  [facility] CARE    Indications    Persistent Atrial Fibrillation [I48.91]           Comments:  home monitor talk to wife about dose [facility]   [phone]            Anticoagulation Care Providers     Provider Role Specialty Phone number    Erica Hansen CNP  Cardiology [phone]    Everett Renee MD  Cardiology [phone]

## 2021-06-12 NOTE — TELEPHONE ENCOUNTER
Received a faxed INR result for Buck Sinclair  From [facility]  INR result dated 10/6/2020 is 2.4

## 2021-06-12 NOTE — TELEPHONE ENCOUNTER
Received a faxed INR result for Buck Sinclair  From [facility]  INR result dated 10/13/2020 is 2.4

## 2021-06-12 NOTE — TELEPHONE ENCOUNTER
ANTICOAGULATION  MANAGEMENT-Patient Home Monitoring Result    Assessment     Therapeutic INR result of 2.9 (goal INR of 2.0-3.0) received via fax from [employer] home INR monitoring company.        Previous INR was Therapeutic    Buck Sinclair last contacted by phone: 7/27    Plan     Per home monitoring agreement with patient, patient was NOT contacted regarding therapeutic result today.  Patient is to continue current dose and continue to check INR with home monitor per protocol.  ?   Cole Mohan RN    Subjective/Objective:      Buck Sinclair, a 75 y.o. male  is established on warfarin using a home INR monitor.    Anticoagulation Episode Summary     Current INR goal:  2.0-3.0   TTR:  93.9 % (1 y)   Next INR check:  10/27/2020   INR from last check:  2.90 (10/20/2020)   Weekly max warfarin dose:     Target end date:     INR check location:     Preferred lab:     Send INR reminders to:  [facility] C[facility]    Indications    Persistent Atrial Fibrillation [I48.91]           Comments:  home monitor talk to wife about dose [facility]   [phone]            Anticoagulation Care Providers     Provider Role Specialty Phone number    Erica Hansen CNP  Cardiology [phone]    Everett Renee MD  Cardiology [phone]

## 2021-06-12 NOTE — PROGRESS NOTES
INR at home 2.8. After talking with pt and discussing history of greens/salads and medication change. Pt will  continue  with current diet and dosing of Warfarin.  Continue with moderation of Vit K and green leafy vegetables. Cautioned to call with increase bruising or bleeding. Reminded to call with medication change especially antibiotic. Call with any questions or concerns or any up coming procedures. Cautioned about using Herbal medication.

## 2021-06-12 NOTE — PATIENT INSTRUCTIONS - HE
Buck Sinclair    Thank you for coming to the [facility] today for a cardiac evaluation  It was my pleasure to see you today  A good contact for any questions would be Val Angeles  RN @ [phone]    Pacemaker defibrillator evaluation today shows battery good for another 2 years  Almost no atrial fibrillation past several months   No ventricular tachycardia  Because of the more prominent heart murmur, will get echocardiogram to evaluate heart function and the mitral and aortic valve  coptinue your current medications-no adjustments were made  Have device check through transtelephonic monitoring every 3 months  Return in 6 months for comprehensive cardiac arrhythmia device assessment

## 2021-06-12 NOTE — PROGRESS NOTES
INR at home 2.5 Continue current management dosing of Warfarin. Continue  diet of moderate Vitamin K intake. Discussed with pt the need to call with questions or concerns or any change in medication especially herbal medication or OTC. Call with increased bleeding or bruising or any upcoming procedures.

## 2021-06-12 NOTE — TELEPHONE ENCOUNTER
ANTICOAGULATION  MANAGEMENT-Patient Home Monitoring Result    Assessment     Therapeutic INR result of 2.4 (goal INR of 2.0-3.0) received via fax from [employer] home INR monitoring company.        Previous INR was Therapeutic    Buck Sinclair last contacted by phone: 8/4/2020    Plan     Per home monitoring agreement with patient, patient was NOT contacted regarding therapeutic result today.  Patient is to continue current dose and continue to check INR with home monitor per protocol.  ?   Gisele Prince RN    Subjective/Objective:      Buck Sinclair, a 75 y.o. male  is established on warfarin using a home INR monitor.    Anticoagulation Episode Summary     Current INR goal:  2.0-3.0   TTR:  93.9 % (1 y)   Next INR check:  10/20/2020   INR from last check:  2.40 (10/13/2020)   Weekly max warfarin dose:     Target end date:     INR check location:     Preferred lab:     Send INR reminders to:  [facility]    Indications    Persistent Atrial Fibrillation [I48.91]           Comments:  home monitor talk to wife about dose [facility]   [phone]            Anticoagulation Care Providers     Provider Role Specialty Phone number    Erica Hansen CNP  Cardiology [phone]    Everett Renee MD  Cardiology [phone]

## 2021-06-12 NOTE — TELEPHONE ENCOUNTER
ANTICOAGULATION  MANAGEMENT    Assessment     Today's INR result of 2.5 is Therapeutic (goal INR of 2.0-3.0)        Warfarin taken as previously instructed    No new diet changes affecting INR    No new medication/supplements affecting INR    Continues to tolerate warfarin with no reported s/s of bleeding or thromboembolism     Previous INR was Therapeutic    Plan:     Spoke on phone with wife Neela regarding INR result and instructed:     Warfarin Dosing Instructions:  Continue current warfarin dose    5 mg every Mon, Fri; 2.5 mg all other days         Instructed patient to follow up no later than: 1 week.    Education provided: importance of following up for INR monitoring at instructed interval and importance of taking warfarin as instructed    Neela verbalizes understanding and agrees to warfarin dosing plan.    Instructed to call the [facility] for any changes, questions or concerns. (#[phone])   ?   Neri Mace RN    Subjective/Objective:      Buck Sinclair, a 75 y.o. male is on warfarin.     Buck reports:     Home warfarin dose: verbally confirmed home dose with Neela and updated on anticoagulation calendar     Missed doses: No     Medication changes:  No     S/S of bleeding or thromboembolism:  No     New Injury or illness:  No     Changes in diet or alcohol consumption:  No     Upcoming surgery, procedure or cardioversion:  No    Anticoagulation Episode Summary     Current INR goal:  2.0-3.0   TTR:  93.9 % (1 y)   Next INR check:  11/3/2020   INR from last check:  2.50 (10/27/2020)   Weekly max warfarin dose:     Target end date:     INR check location:     Preferred lab:     Send INR reminders to:  [facility] CARE    Indications    Persistent Atrial Fibrillation [I48.91]           Comments:  home monitor talk to wife about dose Neela   [phone]            Anticoagulation Care Providers     Provider Role Specialty Phone number    Erica Hansen, CNP  Cardiology  [phone]    Everett Renee MD  Cardiology [phone]

## 2021-06-12 NOTE — TELEPHONE ENCOUNTER
ANTICOAGULATION  MANAGEMENT-Patient Home Monitoring Result    Assessment     Therapeutic INR result of 2.4 (goal INR of 2.0-3.0) received via fax from [employer] home INR monitoring company.        Previous INR was Therapeutic    Buck Sinclair last contacted by phone: 8/4/2020    Plan     Per home monitoring agreement with patient, patient was NOT contacted regarding therapeutic result today.  Patient is to continue current dose and continue to check INR with home monitor per protocol.  ?   Gisele Prince RN    Subjective/Objective:      Bcuk Sinclair, a 75 y.o. male  is established on warfarin using a home INR monitor.    Anticoagulation Episode Summary     Current INR goal:  2.0-3.0   TTR:  93.9 % (1 y)   Next INR check:  10/13/2020   INR from last check:  2.40 (10/6/2020)   Weekly max warfarin dose:     Target end date:     INR check location:     Preferred lab:     Send INR reminders to:  [facility]    Indications    Persistent Atrial Fibrillation [I48.91]           Comments:  home monitor talk to wife about dose [facility]   [phone]            Anticoagulation Care Providers     Provider Role Specialty Phone number    Erica Hansen CNP  Cardiology [phone]    Everett Renee MD  Cardiology [phone]

## 2021-06-12 NOTE — PROGRESS NOTES
INR 3.0 at home Continue current management dosing of Warfarin. Continue  diet of moderate Vitamin K intake. Discussed with pt the need to call with questions or concerns or any change in medication especially herbal medication or OTC. Call with increased bleeding or bruising or any upcoming procedures.

## 2021-06-12 NOTE — PROGRESS NOTES
INR at home 2.6 Continue current management dosing of Warfarin. Continue  diet of moderate Vitamin K intake. Discussed with pt the need to call with questions or concerns or any change in medication especially herbal medication or OTC. Call with increased bleeding or bruising or any upcoming procedures.

## 2021-06-12 NOTE — PROGRESS NOTES
In clinic device check with Dr. Liazma.  Please see link for full device report.  Patient was informed of results and next follow up during today's visit.

## 2021-06-12 NOTE — PROGRESS NOTES
[facility] Heart Care Note  Assessment:  Atrial atrial fibrillation dating back to 1997 with multiple external cardioversions        Atrial fibrillation is very symptomatic with generalized weakness fatigue but not so much palpitations  Chronic amiodarone was partially effective;Continued for many years ; stopped because of photosensitive and other adverse effects   Failed Tikosyn; Sotalol   Pulmonary vein isolation done on 2 occasions 2011   Post PVI Cardoversion February 12/ 2015 ; CV 10-         Cardioversion  11-        Atrial fibrillation April 25, 2017        CV 5-; relapse < 36 hour  Long-standing advanced dilated cardiomyopathy dating back to 1997       Recent stress nuclear scan; April 30 2014 showed ejection fraction 47% without ischemia        Echocardiogram 21 September 2015 showed ejection fraction equals 50%       Stress nuclear scan 11- EF=63%       Transesophageal echocardiogram December 4, 2017; ejection fraction 50% sinus node dysfunction   MedtronicPacemaker implanted May 1999-dual atrial leads;         generator replacement May 2005          Removal of atrial and ventricular pacing leads with placement of ICD system 6 1 2014 and Medtronic single coil   Chronic anticoagulation with warfarin ; he had excessive skin bleeding from Eliquis   Obesity-substantial weight reduction on conscientious diet   COPD felt be related to previous  exposure   Hypertension  Hyperlipidemia well controlled on statin;   Coronary artery disease with stenting to proximal LAD September 20, 2017  Right hand swelling may be related to the subclavian venous  obstruction      Plan:    Pacemaker defibrillator evaluation today shows battery good for another 2 years  Almost no atrial fibrillation past several months   No ventricular tachycardia  Because of the more prominent heart murmur, will get echocardiogram to evaluate heart function and the mitral and aortic valve  coptinue your  current medications-no adjustments were made  Have device check through transtelephonic monitoring every 3 months  Return in 6 months for comprehensive cardiac arrhythmia device assessment          Subjective:    I had the opportunity to see.Buck Sinclair , who is a 75 y.o. male with a known history of complex heart disease  Shortness of breath on minimal exertion and has desaturation  Has been on supplemental oxygen, 2 L nasal since June  Not very breathless at rest but with any exertion becomes quite short of breath  No awareness of palpitations arrhythmias or atrial fibrillation  No fluid retention swelling or edema  No angina  Wonders if his heart is contributing to his shortness of breath or if it is all related to his lung status  Continues on sotalol  Takes low-dose furosemide daily  Exam showed a more prominent systolic murmur suggesting significant mitral regurgitation-we will get echocardiogram to assess  Does not have a history of substantial mitral valve disease            Problem List:  Patient Active Problem List   Diagnosis     Dyslipidemia, goal LDL below 70     Essential hypertension     Persistent Atrial Fibrillation     Cardiomyopathy (H)     ICD (implantable cardioverter-defibrillator) in place     Status post catheter ablation of atrial fibrillation     LIMA (dyspnea on exertion)     Coronary artery disease due to calcified coronary lesion     Hemoptysis     COPD, group B, by GOLD 2017 classification (H)     Hypertrophic obstructive cardiomyopathy (H)     Medical History:  Past Medical History:   Diagnosis Date     Anemia      BPH (benign prostatic hyperplasia)      COPD, group B, by GOLD 2017 classification (H)      Coronary artery disease due to calcified coronary lesion      Dyslipidemia, goal LDL below 70      Essential hypertension      History of cardiomyopathy      History of transfusion      Persistent atrial fibrillation (H)      Pneumonia of left lower lobe due to infectious  organism 10/4/2017     Skin cancer of trunk      Status post catheter ablation of atrial fibrillation 6/7/2017    PVI 4-2011 (Cryo/PVI + roof line + CTI line) Re-do PVI 7-2011 (RFA/PVI + CFE + VIDYA + confirmed CTI line)     Ventricular tachycardia (H)      Surgical History:  Past Surgical History:   Procedure Laterality Date     CARDIAC DEFIBRILLATOR PLACEMENT       CARDIOVERSION  07/11/2018    x20, last 2/12/15, 10/2015, 11/18/16, 6/16/17 by Lauren Foster CNP     CARDIOVERSION  07/11/2018     COLONOSCOPY N/A 4/28/2017    Procedure: COLONOSCOPY with 2 ascending polyps and 1 transverse polyp;  Surgeon: Jose Whittington MD;  Location: [facility] GI;  Service:      CV CORONARY ANGIOGRAM N/A 9/20/2017    Procedure: Coronary Angiogram;  Surgeon: Sergio Cervantes MD;  Location: [facility] Cath Lab;  Service:      EP ICD INSERT       FRACTURE SURGERY Left     wrist     INGUINAL HERNIA REPAIR Left 1967    while in the [employer] in [place] after 13 month in [place]     INSERT / REPLACE / REMOVE PACEMAKER       left hand surgery---tendon repair       [facility] ABLATE HEART DYSRHYTHM FOCUS  04/2011    Catheter Ablation Atrial Fibrillation PVI Apr 2011 (Cryo+RF-PVI + roof line + CTI line)     [place] ABLATE HEART DYSRHYTHM FOCUS  07/2011    Re-do PVI Jul 2011 (RFA-PVI + CFE + VIDYA + confirmation of CTI line)     [place] MYERS W/O FACETEC FORAMOT/DSKC 1/2 VRT SEG, CERVICAL      Laminectomy Lumbar;  Recorded: 03/09/2012;     TOTAL SHOULDER REPLACEMENT Right 03/03/2016    Dr. Abernathy of [facility]     Social History:  Social History     Socioeconomic History     Marital status:      Spouse name: Neela     Number of children: Not on file     Years of education: 12     Highest education level: Not on file   Occupational History     Occupation:      Employer: RETIRED     Occupation:  police     Comment: [place]   Social Needs     Financial resource strain: Not on file     Food insecurity     Worry: Not on file      Inability: Not on file     Transportation needs     Medical: Not on file     Non-medical: Not on file   Tobacco Use     Smoking status: Former Smoker     Packs/day: 1.00     Years: 4.00     Pack years: 4.00     Types: Cigarettes     Quit date: 1968     Years since quittin.8     Smokeless tobacco: Never Used   Substance and Sexual Activity     Alcohol use: Yes     Alcohol/week: 2.0 standard drinks     Types: 2 Cans of beer per week     Comment: 1 beer per week     Drug use: No     Sexual activity: Yes     Partners: Female     Birth control/protection: Post-menopausal   Lifestyle     Physical activity     Days per week: Not on file     Minutes per session: Not on file     Stress: Not on file   Relationships     Social connections     Talks on phone: Not on file     Gets together: Not on file     Attends [employer] service: Not on file     Active member of club or organization: Not on file     Attends meetings of clubs or organizations: Not on file     Relationship status: Not on file     Intimate partner violence     Fear of current or ex partner: Not on file     Emotionally abused: Not on file     Physically abused: Not on file     Forced sexual activity: Not on file   Other Topics Concern     Not on file   Social History Narrative     Not on file       Review of Systems:      General: WNL  Eyes: WNL  Ears/Nose/Throat: WNL  Lungs: Shortness of Breath  Heart: WNL  Stomach: WNL  Bladder: WNL  Muscle/Joints: WNL  Skin: WNL  Nervous System: WNL  Mental Health: WNL     Blood: WNL        Family History:  Family History   Problem Relation Age of Onset     Cancer Mother         leukemia     Cancer Father         bladder     Cancer Sister         breast with lung met.     Aneurysm Sister      CABG Brother      CABG Brother      Valvular heart disease Brother         valve replacement         Allergies:  Allergies   Allergen Reactions     Adhesive Other (See Comments)     ADHESIVE TAPE; SKIN IRRITATION  Foam tape:   Skin removed with tape removal     Amiodarone      ADVERSE REACTION.  Sunlight sensitivity.     Lisinopril Cough       Medications:  Current Outpatient Medications   Medication Sig Dispense Refill     ACETAMINOPHEN (TYLENOL ORAL) Take 1,000 mg by mouth 2 (two) times a day.        albuterol (PROAIR HFA;PROVENTIL HFA;VENTOLIN HFA) 90 mcg/actuation inhaler Inhale 2 puffs every 6 (six) hours as needed for wheezing. 1 Inhaler 11     amoxicillin (AMOXIL) 500 MG tablet Take prior to the Dentist       ascorbic acid (VITAMIN C) 1000 MG tablet Take 1,000 mg by mouth daily.       atorvastatin (LIPITOR) 40 MG tablet Take 40 mg by mouth at bedtime.       budesonide-formoterol (SYMBICORT) 160-4.5 mcg/actuation inhaler Inhale 2 puffs 2 (two) times a day.       co-enzyme Q-10 30 mg capsule Take 100 mg by mouth daily.       furosemide (LASIX) 20 MG tablet TAKE 1 TABLET(20 MG) BY MOUTH DAILY 90 tablet 1     losartan (COZAAR) 50 MG tablet TAKE 1 TABLET BY MOUTH EVERY DAY 90 tablet 2     MAG 64 64 mg TbEC delayed-release tablet TAKE 1 TABLET BY MOUTH TWICE DAILY 180 tablet 0     multivitamin (MULTIVITAMIN) per tablet Take 1 tablet by mouth daily.       omega 3-dha-epa-fish oil (FISH OIL) 1,000 mg (120 mg-180 mg) cap Take 2 tablets by mouth 2 (two) times a day.        polyethylene glycol (MIRALAX) 17 gram packet Take 17 g by mouth daily.       sotaloL (BETAPACE) 80 MG tablet TAKE 2 TABLETS(160 MG) BY MOUTH TWICE DAILY. 360 tablet 0     tiotropium (SPIRIVA) 18 mcg inhalation capsule Place 18 mcg into inhaler and inhale daily.       VITAMIN D2 50,000 unit capsule Take 50,000 Units by mouth 2 (two) times a week. SUN and WED  3     warfarin ANTICOAGULANT (COUMADIN/JANTOVEN) 2.5 MG tablet Takes 1 tablet (2.5mg) to 2 tablets (5mg) by mouth daily, as directed.  Adjust dose based on INR results. 110 tablet 1     vardenafil (LEVITRA) 10 MG tablet Take 10 mg by mouth daily as needed for erectile dysfunction.       No current facility-administered  "medications for this visit.          Surgical site  ICD is well-healed right subclavicular region  Device interrogation  Underlying rhythm is sinus rhythm in the 70s  23% atrial pacing  2.4% ventricular pacing  No atrial arrhythmias recently, had 37 hours of atrial fib April 29, 2020 during atrial fibrillation ventricular rate above 120 about 5% of the time      Objective:   Vital signs:  /78 (Patient Site: Left Arm, Patient Position: Sitting, Cuff Size: Adult Large)   Pulse 80   Resp 20   Ht 6' 3\" (1.905 m)   Wt (!) 299 lb (135.6 kg)   BMI 37.37 kg/m        Physical Exam:  Wearing nasal oxygen 2 L oxygen concentrator  Seems to be gained weight seems quite large    GENERAL APPEARANCE: Alert, cooperative and in no acute distress.  HEENT: No scleral icterus. No Xanthelasma. Oral mucous membranes pink and moist.  NECK: JVP  Thick neck; difficult to assesscm. No Hepatojugular reflux. Thyroid not  Palpable  CHEST: clear to auscultation and percussion  CARDIOVASCULAR: S1, S2 3/6 prominent systolic apical murmur     Brachial, radial  pulses are intact and symmetric.   No carotid bruits noted.  ABDOMEN: Non tender. BS+. No bruits.  EXTREMITIES: No cyanosis, clubbing or edema.    Lab Results:  LIPIDS:  Lab Results   Component Value Date    CHOL 116 10/08/2020    CHOL 133 02/04/2020    CHOL 127 08/02/2019     Lab Results   Component Value Date    HDL 51 10/08/2020    HDL 52 02/04/2020    HDL 47 08/02/2019     Lab Results   Component Value Date    LDLCALC 56 10/08/2020    LDLCALC 66 02/04/2020    LDLCALC 65 08/02/2019     Lab Results   Component Value Date    TRIG 44 10/08/2020    TRIG 75 02/04/2020    TRIG 74 08/02/2019     No components found for: CHOLHDL    BMP:  Lab Results   Component Value Date    CREATININE 1.13 10/08/2020    BUN 25 10/08/2020     10/08/2020    K 4.7 10/08/2020     (H) 10/08/2020    CO2 23 10/08/2020         This note has been dictated using voice recognition software. Any " grammatical or context distortions are unintentional and inherent to the software.  Everett Renee MD  [facility] HEART [facility]  [phone]

## 2021-06-12 NOTE — TELEPHONE ENCOUNTER
Wellness Screening Tool  Symptom Screening:  Do you have one of the following NEW symptoms:    Fever (subjective or >100.0)?  No    A new cough?  No    Shortness of breath?  No     Chills? No     New loss of taste or smell? No     Generalized body aches? No     New persistent headache? No     New sore throat? No     Nausea, vomiting, or diarrhea?  No    Within the past 2 weeks, have you been exposed to someone with a known positive illness below:    COVID-19 (known or suspected)?  No    Chicken pox?  No    Mealses?  No    Pertussis?  No    Patient notified of visitor policy- They may have one person accompany them to their appointment, but they will need to wear a mask and will be screened upon arrival for symptoms: Yes  Pt informed to wear a mask: Yes  Pt notified if they develop any symptoms listed above, prior to their appointment, they are to call the clinic directly at [phone] for further instructions.  Yes  Patient's appointment status: Patient will be seen in clinic as scheduled on 11/2

## 2021-06-13 NOTE — TELEPHONE ENCOUNTER
Received a faxed INR result for Buck Sinclair  From [facility]  INR result dated 11/10/2020 is 2.4

## 2021-06-13 NOTE — PROGRESS NOTES
Telephone conversation with patient   Echocardiogram showed ejection fraction of 50%  Right heart abnormalities with pulmonary pressure 68 and signs of fluid overload and elevated CVP  Was started on spironolactone 25 mg has been feeling pretty well but creatinine increased to 1.3 and potassium 5.2  Plan  Stop spironolactone  Increase furosemide from 20 to 40 mg daily  We will ask the nurses to schedule him for a  BMP  2 weeks

## 2021-06-13 NOTE — TELEPHONE ENCOUNTER
Received a faxed INR result for Buck Sinclair  From [facility]  INR result dated 12/22/2020 is 3.0

## 2021-06-13 NOTE — TELEPHONE ENCOUNTER
ANTICOAGULATION  MANAGEMENT-Patient Home Monitoring Result    Assessment     Therapeutic INR result of 2.4 (goal INR of 2.0-3.0) received via fax from [employer] home INR monitoring company.        Previous INR was Therapeutic    Buck Sinclair last contacted by phone: 10/27/2020    Plan     Per home monitoring agreement with patient, patient was NOT contacted regarding therapeutic result today.  Patient is to continue current dose and continue to check INR with home monitor per protocol.  ?   Gisele Prince RN    Subjective/Objective:      Buck Sinclair, a 75 y.o. male  is established on warfarin using a home INR monitor.    Anticoagulation Episode Summary     Current INR goal:  2.0-3.0   TTR:  95.2 % (1 y)   Next INR check:  11/17/2020   INR from last check:  2.40 (11/10/2020)   Weekly max warfarin dose:     Target end date:     INR check location:     Preferred lab:     Send INR reminders to:  [facility] HEART CARE    Indications    Persistent Atrial Fibrillation [I48.91]           Comments:  home monitor talk to wife about dose [facility]   [phone]            Anticoagulation Care Providers     Provider Role Specialty Phone number    Erica Hansen CNP  Cardiology [phone]    Everett Renee MD  Cardiology [phone]

## 2021-06-13 NOTE — PROGRESS NOTES
Noted.  scheduling notified to contact pt, order(s) were placed, RX was sent to pt pharmacy and medication list updated   12/2/2020 10:12 AM  Val Angeles RN

## 2021-06-13 NOTE — TELEPHONE ENCOUNTER
ANTICOAGULATION  MANAGEMENT-Patient Home Monitoring Result    Assessment     Therapeutic INR result of 2.4 (goal INR of 2.0-3.0) received via fax from [employer] home INR monitoring company.        Previous INR was Therapeutic    Buck Sinclair last contacted by phone: 10/27/2020    Plan     Per home monitoring agreement with patient, patient was NOT contacted regarding therapeutic result today.  Patient is to continue current dose and continue to check INR with home monitor per protocol.  ?   Gisele Prince RN    Subjective/Objective:      Buck Sinclair, a 75 y.o. male  is established on warfarin using a home INR monitor.    Anticoagulation Episode Summary     Current INR goal:  2.0-3.0   TTR:  95.2 % (1 y)   Next INR check:  12/1/2020   INR from last check:  2.40 (11/24/2020)   Weekly max warfarin dose:     Target end date:     INR check location:     Preferred lab:     Send INR reminders to:  [facility] HEART CARE    Indications    Persistent Atrial Fibrillation [I48.91]           Comments:  home monitor talk to wife about dose [facility]   [phone]            Anticoagulation Care Providers     Provider Role Specialty Phone number    Erica Hansen CNP  Cardiology [phone]    Everett Renee MD  Cardiology [phone]

## 2021-06-13 NOTE — PROGRESS NOTES
Echocardiogram shows borderline reduced left-ventricular function there is pulmonary artery pressure elevation and signs of right heart overload  I thought he had a more prominent systolic murmur but mitral valve and aortic valve are satisfactory  Did see pulmonary specialist noted to have a reduced diffusion capacity  Renal function has been normal  Start spironolactone 25 mg daily  Repeat BMP with BNP in 2 weeks

## 2021-06-13 NOTE — TELEPHONE ENCOUNTER
ANTICOAGULATION  MANAGEMENT-Patient Home Monitoring Result    Assessment     Therapeutic INR result of 3.0 (goal INR of 2.0-3.0) received via fax from [employer] home INR monitoring company.        Previous INR was Therapeutic    Buck Sinclair last contacted by phone: 10/27/2020    Plan     Per home monitoring agreement with patient, patient was NOT contacted regarding therapeutic result today.  Patient is to continue current dose and continue to check INR with home monitor per protocol.  ?   Gisele Prince RN    Subjective/Objective:      Buck Sinclair, a 75 y.o. male  is established on warfarin using a home INR monitor.    Anticoagulation Episode Summary     Current INR goal:  2.0-3.0   TTR:  97.2 % (1 y)   Next INR check:  12/29/2020   INR from last check:  3.00 (12/22/2020)   Weekly max warfarin dose:     Target end date:     INR check location:     Preferred lab:     Send INR reminders to:  [facility] HEART CARE    Indications    Persistent Atrial Fibrillation [I48.91]           Comments:  home monitor talk to wife about dose [facility]   [phone]            Anticoagulation Care Providers     Provider Role Specialty Phone number    Erica Hansen CNP  Cardiology [phone]    Everett Renee MD  Cardiology [phone]

## 2021-06-13 NOTE — TELEPHONE ENCOUNTER
Received a faxed INR result for Buck Sinclair  From [facility]  INR result dated 12/1/2020 is 2.4

## 2021-06-13 NOTE — TELEPHONE ENCOUNTER
ANTICOAGULATION  MANAGEMENT-Patient Home Monitoring Result    Assessment     Therapeutic INR result of 2.30 (goal INR of 2.0-3.0) received via fax from [employer] home INR monitoring company.        Previous INR was Therapeutic at 2.40 on 12/1/20.    Buck Sinclair last contacted by phone: 10/27/20.    Plan     Per home monitoring agreement with patient, patient was NOT contacted regarding therapeutic result today.  Patient is to continue current dose and continue to check INR with home monitor per protocol.  ?   Daysi Arciniega RN    Subjective/Objective:      Buck Sinclair, a 75 y.o. male  is established on warfarin using a home INR monitor.    Anticoagulation Episode Summary     Current INR goal:  2.0-3.0   TTR:  96.2 % (1 y)   Next INR check:  12/15/2020   INR from last check:  2.30 (12/8/2020)   Weekly max warfarin dose:     Target end date:     INR check location:     Preferred lab:     Send INR reminders to:  [facility] HEART [facility]    Indications    Persistent Atrial Fibrillation [I48.91]           Comments:  home monitor talk to wife about dose Neela   [phone]            Anticoagulation Care Providers     Provider Role Specialty Phone number    Erica Hansen CNP  Cardiology [phone]    Everett Renee MD  Cardiology [phone]

## 2021-06-13 NOTE — PROGRESS NOTES
Noted. Spoke to pt regarding results and recommendations per Dr. Renee.  He agrees to plan, will  spironolactone 25mg daily and start taking in the AM.  Repeat BMP and BNP in 2 weeks.    Will enter orders now.    Jessica- please call pt to schedule labs in 2 weeks.    Thanks!    [place]

## 2021-06-13 NOTE — TELEPHONE ENCOUNTER
ANTICOAGULATION  MANAGEMENT-Patient Home Monitoring Result    Assessment     Therapeutic INR result of 2.7 (goal INR of 2.0-3.0) received via fax from [employer] home INR monitoring company.        Previous INR was Therapeutic    Buck Sinclair last contacted by phone: 10/27/2020    Plan     Per home monitoring agreement with patient, patient was NOT contacted regarding therapeutic result today.  Patient is to continue current dose and continue to check INR with home monitor per protocol.  ?   Gisele Prince RN    Subjective/Objective:      Buck Sinclair, a 75 y.o. male  is established on warfarin using a home INR monitor.    Anticoagulation Episode Summary     Current INR goal:  2.0-3.0   TTR:  97.2 % (1 y)   Next INR check:  12/22/2020   INR from last check:  2.70 (12/15/2020)   Weekly max warfarin dose:     Target end date:     INR check location:     Preferred lab:     Send INR reminders to:  [facility] HEART CARE    Indications    Persistent Atrial Fibrillation [I48.91]           Comments:  home monitor talk to wife about dose [facility]   [phone]            Anticoagulation Care Providers     Provider Role Specialty Phone number    Erica Hansen CNP  Cardiology [phone]    Everett Renee MD  Cardiology [phone]

## 2021-06-13 NOTE — TELEPHONE ENCOUNTER
Received a faxed INR result for Buck Sinclair  From [facility]  INR result dated 11/17/2020 is 2.4

## 2021-06-13 NOTE — PROGRESS NOTES
Pulmonary Clinic Follow-up Visit    Assessment/Plan:  72 year old male with a history of tobacco dependence in remission, CAD s/p PCI/stents, afib s/p PVL with ICD/PPM on anticoagulation, history of cavitary lesion and extensive pneumonia in LLL in Oct 2017, subsequent recurrent LLL pneumonia, since resolved, presenting for follow up. Repeat PFT's today actually showed improved FEV1/FVC ratio, stable FEV1 but substantial worsening of the DLco (almost 40% lower than in 2018). This is consistent with worsening exertional hypoxemia and probably pulmonary vascular disease. He has significant dyspnea on exertion which overall appears stable.      Plan:  #COPD, GOLD class B (CAT >10, few exacerbations/low risk for hospitalization). Minimal smoking history so this is probably due to his work as a . PFTs 2017 showed mild obstruction and normal DLco. Mild exertional hypoxemia noted on 6MWT in 2019. Now on supplemental oxygen as of this year.   - continue budesonide-formoterol 160-4.5 mcg two inhalations BID with spacer; rinse/gargle/spit water after use. No dose changes today at is is still cold out and winter weather significantly affects his breathing.  - continue tiotropium HandiHaler one inhalation daily  - Cont albuterol as needed  - encouraged exercise, weight loss as able  - continue supplemental O2 for goal O2 sat 88-92%     #exertional hypoxemia, worsening DLco, significant LIMA: suspicion for PVD/pulm HTN since his LIMA seems out of proportion to PFT abnormalities from 2018. Likely due to extensive left-sided heart disease and possible valvular disease as well as hypoxemic lung disease (emphysema). Significant worsening of DLco over the last 2 years. Last echo 2019 did not show markedly abnormal PA pressures but was a limited study. He did have borderline reduced EF of 45%. Will communicate with Dr. Renee about utility of RHC after his next echocardiogram. Unclear if RHC would  at this  point as he is not likely to be a vasodilator candidate due to significant underlying lung disease as well as left-heart disease.   - UTD w/ pneumonia and flu vaccines. Rec flu shot every Fall.     #LLL nodule: decreasing in size on 3 month f/u scan after abx. 9mm -> 7mm and associated with scarring  - no further imaging needed unless recurrent symptoms     #Hemoptysis: no further recurrence. Likely was due to the prior LLL pneumonia in the setting of anticoagulation.  - continue INR goal close to 2, as discussed with Dr. Renee   - he'll let me know if he has any concerning symptoms.     Follow up in 6 months.    CCx: follow up of hemoptysis, and COPD GOLD class B    HPI: Interim history: I last saw Buck on 4/15 on a virtual visit. Since that time, he has started oxygen therapy 2Lpm at rest and 3-4Lpm with activity.  Today he reports he's doing about the same.  His pacemaker rate was recently increased to 60Bpm by his EP Dr. Renee. He feels a little better in terms of breathing and stamina with the higher rate.  No cough or fevers.   Using oxygen essentially all the time.  Did not find pulm rehab helpful in the past.     ROS:  A 12-system review was obtained and was negative with the exception of the symptoms endorsed in the history of present illness.    PMH:  Past Medical History:   Diagnosis Date     Anemia      BPH (benign prostatic hyperplasia)      COPD, group B, by GOLD 2017 classification (H)      Coronary artery disease due to calcified coronary lesion      Dyslipidemia, goal LDL below 70      Essential hypertension      History of cardiomyopathy      History of transfusion      Persistent atrial fibrillation (H)      Pneumonia of left lower lobe due to infectious organism 10/4/2017     Skin cancer of trunk      Status post catheter ablation of atrial fibrillation 6/7/2017    PVI 4-2011 (Cryo/PVI + roof line + CTI line) Re-do PVI 7-2011 (RFA/PVI + CFE + VIDYA + confirmed CTI line)     Ventricular  tachycardia (H)        PSH:  Past Surgical History:   Procedure Laterality Date     CARDIAC DEFIBRILLATOR PLACEMENT       CARDIOVERSION  07/11/2018    x20, last 2/12/15, 10/2015, 11/18/16, 6/16/17 by Lauren Foster CNP     CARDIOVERSION  07/11/2018     COLONOSCOPY N/A 4/28/2017    Procedure: COLONOSCOPY with 2 ascending polyps and 1 transverse polyp;  Surgeon: Jose Whittington MD;  Location: [facility];  Service:      CV CORONARY ANGIOGRAM N/A 9/20/2017    Procedure: Coronary Angiogram;  Surgeon: Sergio Cervantes MD;  Location: [facility];  Service:      EP ICD INSERT       FRACTURE SURGERY Left     wrist     INGUINAL HERNIA REPAIR Left 1967    while in the [employer] in [place] after 13 month in [place]     INSERT / REPLACE / REMOVE PACEMAKER       left hand surgery---tendon repair       MN ABLATE HEART DYSRHYTHM FOCUS  04/2011    Catheter Ablation Atrial Fibrillation PVI Apr 2011 (Cryo+RF-PVI + roof line + CTI line)     MN ABLATE HEART DYSRHYTHM FOCUS  07/2011    Re-do PVI Jul 2011 (RFA-PVI + CFE + VIDYA + confirmation of CTI line)     MN MYERS W/O FACETEC FORAMOT/DSKC 1/2 VRT SEG, CERVICAL      Laminectomy Lumbar;  Recorded: 03/09/2012;     TOTAL SHOULDER REPLACEMENT Right 03/03/2016    Dr. Abernathy of [facility]       Allergies:  Allergies   Allergen Reactions     Adhesive Other (See Comments)     ADHESIVE TAPE; SKIN IRRITATION  Foam tape:  Skin removed with tape removal     Amiodarone      ADVERSE REACTION.  Sunlight sensitivity.     Lisinopril Cough       Family HX:  Family History   Problem Relation Age of Onset     Cancer Mother         leukemia     Cancer Father         bladder     Cancer Sister         breast with lung met.     Aneurysm Sister      CABG Brother      CABG Brother      Valvular heart disease Brother         valve replacement       Social Hx:  Social History     Socioeconomic History     Marital status:      Spouse name: Nelea     Number of children: Not on file      Years of education: 12     Highest education level: Not on file   Occupational History     Occupation:      Employer: RETIRED     Occupation:  police     Comment: [place]   Social Needs     Financial resource strain: Not on file     Food insecurity     Worry: Not on file     Inability: Not on file     Transportation needs     Medical: Not on file     Non-medical: Not on file   Tobacco Use     Smoking status: Former Smoker     Packs/day: 1.00     Years: 4.00     Pack years: 4.00     Types: Cigarettes     Quit date: 1968     Years since quittin.8     Smokeless tobacco: Never Used   Substance and Sexual Activity     Alcohol use: Yes     Alcohol/week: 2.0 standard drinks     Types: 2 Cans of beer per week     Comment: 1 beer per week     Drug use: No     Sexual activity: Yes     Partners: Female     Birth control/protection: Post-menopausal   Lifestyle     Physical activity     Days per week: Not on file     Minutes per session: Not on file     Stress: Not on file   Relationships     Social connections     Talks on phone: Not on file     Gets together: Not on file     Attends Religion service: Not on file     Active member of club or organization: Not on file     Attends meetings of clubs or organizations: Not on file     Relationship status: Not on file     Intimate partner violence     Fear of current or ex partner: Not on file     Emotionally abused: Not on file     Physically abused: Not on file     Forced sexual activity: Not on file   Other Topics Concern     Not on file   Social History Narrative     Not on file       Current Meds:  Current Outpatient Medications   Medication Sig Dispense Refill     ACETAMINOPHEN (TYLENOL ORAL) Take 1,000 mg by mouth 2 (two) times a day.        albuterol (PROAIR HFA;PROVENTIL HFA;VENTOLIN HFA) 90 mcg/actuation inhaler Inhale 2 puffs every 6 (six) hours as needed for wheezing. 1 Inhaler 11     amoxicillin (AMOXIL) 500 MG tablet Take prior to the  "Dentist       ascorbic acid (VITAMIN C) 1000 MG tablet Take 1,000 mg by mouth daily.       atorvastatin (LIPITOR) 40 MG tablet Take 40 mg by mouth at bedtime.       budesonide-formoterol (SYMBICORT) 160-4.5 mcg/actuation inhaler Inhale 2 puffs 2 (two) times a day.       co-enzyme Q-10 30 mg capsule Take 100 mg by mouth daily.       furosemide (LASIX) 20 MG tablet TAKE 1 TABLET(20 MG) BY MOUTH DAILY 90 tablet 1     losartan (COZAAR) 50 MG tablet TAKE 1 TABLET BY MOUTH EVERY DAY 90 tablet 2     MAG 64 64 mg TbEC delayed-release tablet TAKE 1 TABLET BY MOUTH TWICE DAILY 180 tablet 0     multivitamin (MULTIVITAMIN) per tablet Take 1 tablet by mouth daily.       omega 3-dha-epa-fish oil (FISH OIL) 1,000 mg (120 mg-180 mg) cap Take 2 tablets by mouth 2 (two) times a day.        OXYGEN-AIR DELIVERY SYSTEMS [facility] Use As Directed. 2 L at rest/night and 3-4L with activity  [employer]       polyethylene glycol (MIRALAX) 17 gram packet Take 17 g by mouth daily.       sotaloL (BETAPACE) 80 MG tablet TAKE 2 TABLETS(160 MG) BY MOUTH TWICE DAILY. 360 tablet 0     tiotropium (SPIRIVA) 18 mcg inhalation capsule Place 18 mcg into inhaler and inhale daily.       VITAMIN D2 50,000 unit capsule Take 50,000 Units by mouth 2 (two) times a week. SUN and WED  3     warfarin ANTICOAGULANT (COUMADIN/JANTOVEN) 2.5 MG tablet Takes 1 tablet (2.5mg) to 2 tablets (5mg) by mouth daily, as directed.  Adjust dose based on INR results. 110 tablet 1     No current facility-administered medications for this visit.        Physical Exam:  /76   Pulse 63   Ht 6' 3\" (1.905 m)   Wt (!) 275 lb (124.7 kg) Comment: per pt  SpO2 97% Comment: 2L pulse dose  BMI 34.37 kg/m    Gen: alert, oriented, no distress  HEENT: nasal turbinates are unremarkable, no oropharyngeal lesions, no cervical or supraclavicular lymphadenopathy  CV: RRR, no M/G/R  Resp: CTAB, no focal crackles or wheezes  Abd: soft, nontender, no palpable organomegaly  Skin: no apparent " rashes  Ext: no cyanosis, clubbing or edema  Neuro: alert, nonfocal    Labs:  Reviewed  Dec 2018  Chem panel wnl  TSH wnl  hgb 12.1 Oct 2018    Previous testing  DONNA neg  blasto neg  Histo testing neg  c-ANCA neg  HIV neg  RF neg    Bronch/LLL BAL cultures neg      Imaging studies:  CT chest April 2018  IMPRESSION:   CONCLUSION:  1.  Mild scarring and slight bronchiectasis present at both lung bases. No active pneumonia identified.    CT chest 7/15/2019  IMPRESSION:   CONCLUSION:   1.  Nodular opacity of the left lower lobe of interest on 04/17/2019 is less conspicuous today and probably explained by scarring. Additional 6 month chest CT follow-up is recommended.  2.  Emphysema and scattered areas of scarring elsewhere in both lungs.  3.  Advanced multivessel coronary artery disease.      Left Ventricle: Normal left ventricular size.The estimated left ventricular ejection fraction is 45%.Mild concentric hypertrophy noted. Normal septal motion. Unable to assess left ventricular diastolic function given patient is in atrial fibrillation.    Wall Scoring: Resting Even with Definity contrast, left ventricular wall motion is difficult to evaluate. Suspicious of inferior basilar hypokinesis on top of mild global hypokinesis.    Right Ventricle: Normal right ventricular size and systolic function. TAPSE is normal, which is consistent with normal right ventricular systolic function. Pacer lead is present in the ventricle.    Left Atrium: Left atrial volume is severely increased.    Tricuspid Valve: Tricuspid valve not well visualized. There is no evidence of tricuspid stenosis. Mild to moderate tricuspid valve regurgitation. The TR peak velocity is over estimated. Right ventricular systolic pressure is likely within normal limits.     Compared to echocardiogram from August 29, 2018, the findings are similar but both echoes are technically challenging.  There is more suspicion of more prominent inferior basilar hypokinesis  on the current study.  The TR jet that evaluated the right ventricular systolic pressure, is contaminated and suspect relatively normal right ventricular systolic pressure estimate.    PFTs 2018  FEV1/FVC is 0.56 and is reduced.  FEV1 is 73% predicted and is reduced.  FVC is 96% predicted and normal.  There was no improvement in spirometry after a single inhaled dose of bronchodilator.  TLC is 99% predicted and is normal.  RV is 113% predicted and is normal.  DLCO is 93% predicted and is normal when it   is corrected for hemoglobin.     Impression:  Full Pulmonary Function Test is abnormal.  PFTs are consistent with mild obstructive disease.  Spirometry is not consistent with reversibility.  There is no hyperinflation.  There is no air-trapping.  Diffusion capacity when corrected for hemoglobin is normal.  Declines in both FEV1 and TLC since 2009 with overall preservation of diffusing capacity.    PFTs 11/10/2029  FEV1 2.42L 68%  FVC 76%  No BD response  Ratio 0.66 (LLN 0.6)  DLco 46% han for hgb  Flow vol curve suggests obstruction.  Impression: no obstruction based on ratio >LLN but FV curve does suggest some obstruction. Significant decline in DLco compared to DLco.     July 2019  SIX MINUTE WALK TEST / HOME O2 EVALUATION     SpO2 at rest on RA 96%  SpO2 started to drop after 2 1/2 minutes walking. SpO2 after walking 2 1/2 minutes on RA was 88%  SpO2 after walking 6 minutes on RA was 87%  Distance covered 320.04 meters.  Recovery phase, SpO2 after 1 minute rest on RA was 87%  Recovery phase, SpO2 after 2 minute rest on RA was 97%     Impression:   Significant desaturation with activities.   Patient does qualify for O2 supplementation with activity.    Hugh Payton MD (Avi)  [facility]  Pager [phone]  Clinic [phone]

## 2021-06-13 NOTE — TELEPHONE ENCOUNTER
Wellness Screening Tool  Symptom Screening:  Do you have one of the following NEW symptoms:    Fever (subjective or >100.0)?  No    A new cough?  No    Shortness of breath?  No     Chills? No     New loss of taste or smell? No     Generalized body aches? No     New persistent headache? No     New sore throat? No     Nausea, vomiting, or diarrhea?  No    Within the past 2 weeks, have you been exposed to someone with a known positive illness below:    COVID-19 (known or suspected)?  No    Chicken pox?  No    Mealses?  No    Pertussis?  No    Patient notified of visitor policy- No visitors are allowed to accompany the patient at this time. Yes  Pt informed to wear a mask: Yes  Pt notified if they develop any symptoms listed above, prior to their appointment, they are to call the clinic directly at [phone] for further instructions.  Yes  Patient's appointment status: Patient will be seen in clinic as scheduled on 12/1/20

## 2021-06-13 NOTE — PROGRESS NOTES
1945  [phone] (home)  [phone] (mobile)    +++Important patient information for CSC/Cath Lab staff : None+++    [facility] EP Cath Lab Procedure Order   Left Atrial Appendage Occlusion Device:  CHARLY Occlusion Device Implant    Diagnosis:  AF  Anticipated Case Duration:  Standard  Scheduling Needs/Timeframe:  Next Available    MD Preference: None  MD Assist:  Yes Specific MD:  N/A    Current Device: Dual ICD  Device Company/Device Rep Needed for Procedure: [employer]    Pre-Procedural Testing needed: KANDICE  ICE Needed:  Yes  Implanting device company: Research to determine and notify CV , if pt declines research [employer]  Non-Invasive Cardiologist: Whole Case  Anesthesia:  General-Whole Case  Research Protocol:  Research Notified    [facility] EP Cath Lab Prep   Ordering Provider: Dr Renee/Lauren Leger  Ordering Date: 10/12/2017  Orders Status: Intial order placed and Order set placed  EP NC Contact: Leana Walton RN    H&P:  EP NP to complete within 30 days prior to scheduled implant  PCP: Vivek Guerrero MD, [phone]    Pre-op Labs: Done within 7 days    Medical Records Pertinent for Procedure:  Angio w/PCI 9/20/17, DCCV 6/6/17, Echo EF 50% (6/19/17), EKG -paced (10/04/17), Device Implant Record 6/11/14 and Ablation Record (   )    Patient Education:    Teach with Patient: Teach with pt completed same day as pre-op H&P-see additional clinic note    Risks Reviewed:   Specific CHARLY occlusion (WATCHMAN) risks (< 2%):      - device embolization (device moves from intended location)      - air embolism (air bubbles in the blood stream)      - heart perforation (hole in the heart)      - pericardial effusion (fluid collection in the sack around the heart)      - device thrombosis (clot on the device)      - atrial septal defect (hole between upper chambers of the heart)      - stroke or TIA      - death    Risks associated with heart catheterization:      - groin bleeding/swelling      - AV fistula  (hole between artery and vein)      - blood loss      - clot in the vein    KANDICE risks:      - throat pain, esophageal bleeding/trauma      Consent: Will be obtained in CSC day of procedure    Pre-Procedure Instructions that were Reviewed with Patient:  NPO after midnight, Notified patient of time and date of procedure by CV , Transportation arrangements needed s/p procedure, Post-procedure follow up process, Sedation plan/orders and Pre-procedure letter was sent to pt by CV     Pre-Procedure Medication Instructions:  Instructions given to pt regarding anticoagulants: Coumadin- instructed to continue anticoagulation uninterrupted through their procedure  Instructions given to pt regarding antiarrhythmic medication: None; Pt instructed to continue medication prior to procedure  Instructions for medication, other than anticoagulants/antiarrhythmics listed above, given to pt: to take all morning medications with small sips of water, with the exception of OTC supplements and MVI      Allergies   Allergen Reactions     Adhesive Other (See Comments)     ADHESIVE TAPE; SKIN IRRITATION     Amiodarone      ADVERSE REACTION.  Sunlight sensitivity.     Lisinopril Cough       Current Outpatient Prescriptions:      ACETAMINOPHEN (TYLENOL ORAL), Take 1,000 mg by mouth 2 (two) times a day. , Disp: , Rfl:      amoxicillin-clavulanate (AUGMENTIN) 875-125 mg per tablet, Take 1 tablet by mouth 2 (two) times a day., Disp: 56 tablet, Rfl: 0     ascorbic acid (VITAMIN C) 1000 MG tablet, Take 1,000 mg by mouth daily., Disp: , Rfl:      aspirin 81 mg chewable tablet, Chew 1 tablet (81 mg total) daily., Disp: , Rfl: 0     atorvastatin (LIPITOR) 40 MG tablet, Take 40 mg by mouth daily., Disp: , Rfl:      budesonide-formoterol (SYMBICORT) 160-4.5 mcg/actuation inhaler, Inhale 2 puffs 2 (two) times a day., Disp: , Rfl:      CALCIUM POLYCARBOPHIL (FIBER-LAX ORAL), Take 5 mg by mouth 2 (two) times a day. , Disp: , Rfl:       clopidogrel (PLAVIX) 75 mg tablet, Take 1 tablet (75 mg total) by mouth daily., Disp: 30 tablet, Rfl: 11     co-enzyme Q-10 30 mg capsule, Take 100 mg by mouth daily., Disp: , Rfl:      diltiazem (CARTIA XT) 240 MG 24 hr capsule, Take 1 capsule (240 mg total) by mouth daily., Disp: 90 capsule, Rfl: 1     furosemide (LASIX) 40 MG tablet, Take 20 mg by mouth daily. , Disp: , Rfl:      losartan (COZAAR) 50 MG tablet, Take 1 tablet (50 mg total) by mouth daily., Disp: 90 tablet, Rfl: 5     MAG 64 64 mg TbEC delayed-release tablet, TAKE 1 TABLET BY MOUTH TWICE DAILY, Disp: 180 tablet, Rfl: 2     multivitamin (MULTIVITAMIN) per tablet, Take 1 tablet by mouth daily., Disp: , Rfl:      polyethylene glycol (MIRALAX) 17 gram packet, Take 17 g by mouth daily., Disp: , Rfl:      sotalol (BETAPACE) 120 MG tablet, TAKE 1 TABLET(120 MG) BY MOUTH TWICE DAILY, Disp: 180 tablet, Rfl: 1     tiotropium (SPIRIVA) 18 mcg inhalation capsule, Place 18 mcg into inhaler and inhale daily., Disp: , Rfl:      vardenafil (LEVITRA) 10 MG tablet, Take 10 mg by mouth daily as needed for erectile dysfunction., Disp: , Rfl:      VITAMIN D2 50,000 unit capsule, Take 50,000 Units by mouth 2 (two) times a week. SUN and WED, Disp: , Rfl: 3

## 2021-06-13 NOTE — PROGRESS NOTES
INR 2.8 at home. Continue current management dosing of Warfarin. Continue  diet of moderate Vitamin K intake. Discussed with pt the need to call with questions or concerns or any change in medication especially herbal medication or OTC. Call with increased bleeding or bruising or any upcoming procedures.

## 2021-06-13 NOTE — PROGRESS NOTES
INR 1.8 at home will continue on current dose and retest in one week. Going out this week and will have no greens and increase etoh. After talking with pt and discussing history of greens/salads and medication change. Pt will  continue  with current diet and dosing of Warfarin.  Continue with moderation of Vit K and green leafy vegetables. Cautioned to call with increase bruising or bleeding. Reminded to call with medication change especially antibiotic. Call with any questions or concerns or any up coming procedures. Cautioned about using Herbal medication.

## 2021-06-13 NOTE — TELEPHONE ENCOUNTER
Received a faxed INR result for Buck Sinclair  From [facility]  INR result dated 12/15/2020 is 2.7

## 2021-06-13 NOTE — TELEPHONE ENCOUNTER
Received a faxed INR result for Buck Sinclair  From [facility]  INR result dated 12/8/2020 is 2.3

## 2021-06-13 NOTE — PROGRESS NOTES
[facility]    Assessment/Plan:      Problem List Items Addressed This Visit     Essential hypertension (Chronic)    Persistent Atrial Fibrillation - Primary (Chronic)    ICD (implantable cardioverter-defibrillator), dual, in situ    Status post catheter ablation of atrial fibrillation    Coronary artery disease        1.  Persistent atrial fibrillation: One prolonged episode since cardioversion at spontaneously terminated, overall a relatively low burden of arrhythmia.  Continue sotalol 120 mg twice a day.   He was recently diagnosed with CAD requiring PCI which increases his [id number] score to 3 for age 65-74, CAD, and hypertension.  He was also recently hospitalized for hemoptysis due to pneumonia with a cavitary lesion with a large clot in his left mainstem bronchus while on aspirin, Plavix, and warfarin.  Due to risk for recurrent bleed his warfarin was discontinued.  He also has some balance issues and frequent sometimes significant bruising. His HAS-BLED score is 4 for concomittant aspirin and Plavix, recent bleed, and age >65.  He is no longer a good candidate for long-term anticoagulation with warfarin and a candidate for left atrial appendage occlusion with the Watchman device.  We discussed the need for pre-and post procedure KANDICE and need for short-term anticoagulation to continue for at least 6 weeks post-implant.  We discussed the procedures, <2% risk for device embolization, air embolism, myocardial perforation, device thrombosis, ASD, stroke, or death, expected recovery, and follow-up.  His questions were answered to his satisfaction, and he wishes to proceed.    2.  Sick sinus syndrome status post pacemaker implant with subsequent upgrade to ICD for idiopathic cardiomyopathy, normalization of the EF: Appears well compensated with no signs of acute fluid overload.  Most recent echocardiogram shows stable left ventricular systolic performance, EF remains at 50%, consistent with recent  stress test results.  Most recent ICD interrogation was reviewed and is functioning appropriately.  The battery shows estimated longevity of 5.9 years.  Atrial and ventricular leads are stable and within normal limits.      3.  Hypertension: Blood pressure at target today.    4.  Coronary artery disease.  His breathing has been improving since stent placement on 9/20/2017.  Per Dr. Cervantes's procedure note, he is to remain on aspirin and Plavix for 1 month following PCI, then discontinue aspirin at which time he can restart warfarin.    Follow-up with Dr. Barrow on 11/8/2017.    Subjective:      Buck Sinclair, a pleasant 72-year-old gentleman, comes in today accompanied by his wife for follow-up of atrial fibrillation.  He has a long history of atrial fibrillation and flutter for which he underwent complex left and right atrial ablations in April 2011 and a redo in July 2011 with recurrence of atrial fibrillation after the last ablation.  Previously, he has failed all antiarrhythmic medications and is intolerant of amiodarone, however has been fairly well suppressed with sotalol 120 mg twice a day.  He reports having, in total, over 20 cardioversions, the last on 6/16/17.  Symptoms typically consist of progressive fatigue and dyspnea on exertion and fluid retention with persistent episodes, though he is asymptomatic with shorter episodes.  He was taking Xarelto for stroke prophylaxis, but this was discontinued due to a concern for interaction with his meloxicam.  He has been on warfarin since 9/16/15 which he does through home monitoring.  He underwent coronary angiogram on 9/20/2017 due to ongoing dyspnea on exertion and abnormal stress test during which he received drug-eluting stents to the LAD and 1st diagonal.  Subsequently started on aspirin and Plavix in addition to warfarin.  He was hospitalized on 10/4/2017 for hemoptysis and was diagnosed with pneumonia with a cavitary lesion.  Bronchoscopy found a  large clot in his left mainstem bronchus.  His warfarin was discontinued and he remains on aspirin and Plavix.    He also has a history of hypertension, hyperlipidemia, and sinus bradycardia for which he initially underwent pacemaker implant on 5/18/99 with a dual site atrial pacing system.  He underwent generator replacement on 5/23/05 at which time one of the atrial leads was noted to have an insulation fracture that was repaired with a silicone sleeve, but ultimately capped and abandoned.  Due to advanced dilated cardiomyopathy with an EF of 35%, he underwent lead extraction the right atrial and ventricular pacing leads to allow for an upgrade to an ICD system on 6/11/14.  Echocardiogram performed 6/19/17 shows normalization of his left ventricular systolic performance with an EF of 50%.   He underwent a sleep study on 5/11/15 that did not show significant obstructive sleep apnea/hypopnea sufficient to disturb sleep.    Chente reports some improvement in breathing since his PCI, but is also recovering from pneumonia.  He has had no symptomatic recurrence of atrial fibrillation, but is very nervous being off warfarin.  He denies chest discomfort, palpitations, abdominal bloating/fullness or peripheral edema, shortness of breath at rest, paroxysmal nocturnal dyspnea, orthopnea, lightheadedness, dizziness, pre-syncope, or syncope.  He continues to have some balance issues.    Medical, surgical, family, social history, and medications were reviewed and updated as necessary.    Patient Active Problem List   Diagnosis     Dyslipidemia     Essential hypertension     Persistent Atrial Fibrillation     Cardiomyopathy, idiopathic     History of sick sinus syndrome     ICD (implantable cardioverter-defibrillator), dual, in situ     Status post catheter ablation of atrial fibrillation     Dyspnea on exertion     Abnormal nuclear stress test     Coronary artery disease     Pneumonia of left lower lobe due to infectious organism      Hemoptysis     Anticoagulated     COPD without exacerbation       Past Medical History:   Diagnosis Date     Anemia      BPH (benign prostatic hyperplasia)      Cardiomyopathy      COPD (chronic obstructive pulmonary disease)      Coronary artery disease      Dyslipidemia, goal LDL below 100      Essential hypertension      High cholesterol      History of transfusion      Persistent atrial fibrillation      Skin cancer of trunk      Status post catheter ablation of atrial fibrillation 6/7/2017    PVI 4-2011 (Cryo/PVI + roof line + CTI line) Re-do PVI 7-2011 (RFA/PVI + CFE + VIDYA + confirmed CTI line)     Ventricular tachycardia        Past Surgical History:   Procedure Laterality Date     CARDIAC DEFIBRILLATOR PLACEMENT       CARDIOVERSION      x20, last 2/12/15, 10/2015, 11/18/16, 6/16/17 by Lauren Foster CNP     COLONOSCOPY N/A 4/28/2017    Procedure: COLONOSCOPY with 2 ascending polyps and 1 transverse polyp;  Surgeon: Jose Whittington MD;  Location: [facility] GI;  Service:      CV CORONARY ANGIOGRAM N/A 9/20/2017    Procedure: Coronary Angiogram;  Surgeon: Sergio Cervantes MD;  Location: [facility];  Service:      EP ICD INSERT       FRACTURE SURGERY Left     wrist     INGUINAL HERNIA REPAIR Left 1967    while in the [employer] in [email or web address] after 13 month in [place]     INSERT / REPLACE / REMOVE PACEMAKER       CA ABLATE HEART DYSRHYTHM FOCUS  04/2011    Catheter Ablation Atrial Fibrillation PVI Apr 2011 (Cryo+RF-PVI + roof line + CTI line)     CA ABLATE HEART DYSRHYTHM FOCUS  07/2011    Re-do PVI Jul 2011 (RFA-PVI + CFE + VIDYA + confirmation of CTI line)     CA MYERS W/O FACETEC FORAMOT/DSKC 1/2 VRT SEG, CERVICAL      Laminectomy Lumbar;  Recorded: 03/09/2012;     TOTAL SHOULDER REPLACEMENT Right 03/03/2016    Dr. Abernathy of [facility]       Allergies   Allergen Reactions     Adhesive Other (See Comments)     ADHESIVE TAPE; SKIN IRRITATION     Amiodarone      ADVERSE REACTION.  Sunlight  sensitivity.     Lisinopril Cough       Current Outpatient Prescriptions   Medication Sig Dispense Refill     ACETAMINOPHEN (TYLENOL ORAL) Take 1,000 mg by mouth 2 (two) times a day.        amoxicillin-clavulanate (AUGMENTIN) 875-125 mg per tablet Take 1 tablet by mouth 2 (two) times a day. 56 tablet 0     ascorbic acid (VITAMIN C) 1000 MG tablet Take 1,000 mg by mouth daily.       aspirin 81 mg chewable tablet Chew 1 tablet (81 mg total) daily.  0     atorvastatin (LIPITOR) 40 MG tablet Take 40 mg by mouth daily.       budesonide-formoterol (SYMBICORT) 160-4.5 mcg/actuation inhaler Inhale 2 puffs 2 (two) times a day.       CALCIUM POLYCARBOPHIL (FIBER-LAX ORAL) Take 5 mg by mouth 2 (two) times a day.        clopidogrel (PLAVIX) 75 mg tablet Take 1 tablet (75 mg total) by mouth daily. 30 tablet 11     co-enzyme Q-10 30 mg capsule Take 100 mg by mouth daily.       diltiazem (CARTIA XT) 240 MG 24 hr capsule Take 1 capsule (240 mg total) by mouth daily. 90 capsule 1     furosemide (LASIX) 40 MG tablet Take 20 mg by mouth daily.        losartan (COZAAR) 50 MG tablet Take 1 tablet (50 mg total) by mouth daily. 90 tablet 5     MAG 64 64 mg TbEC delayed-release tablet TAKE 1 TABLET BY MOUTH TWICE DAILY 180 tablet 2     multivitamin (MULTIVITAMIN) per tablet Take 1 tablet by mouth daily.       polyethylene glycol (MIRALAX) 17 gram packet Take 17 g by mouth daily.       sotalol (BETAPACE) 120 MG tablet TAKE 1 TABLET(120 MG) BY MOUTH TWICE DAILY 180 tablet 1     tiotropium (SPIRIVA) 18 mcg inhalation capsule Place 18 mcg into inhaler and inhale daily.       vardenafil (LEVITRA) 10 MG tablet Take 10 mg by mouth daily as needed for erectile dysfunction.       VITAMIN D2 50,000 unit capsule Take 50,000 Units by mouth 2 (two) times a week. SUN and WED  3     No current facility-administered medications for this visit.        Family History   Problem Relation Age of Onset     Cancer Mother      leukemia     Cancer Father       "bladder     Cancer Sister        Social History   Substance Use Topics     Smoking status: Former Smoker     Types: Cigarettes     Quit date: 1/1/1968     Smokeless tobacco: Never Used     Alcohol use 1.2 oz/week     2 Cans of beer per week      Comment: 1 beer per week       Review of Systems:   General: WNL  Eyes: WNL  Ears/Nose/Throat: WNL  Lungs: WNL  Heart: WNL  Stomach: WNL  Bladder: WNL  Muscle/Joints: WNL  Skin: WNL  Nervous System: WNL  Mental Health: WNL     Blood: WNL      Objective:      /76 (Patient Site: Left Arm, Patient Position: Sitting, Cuff Size: Adult Large)  Pulse 90  Resp 16  Ht 6' 3\" (1.905 m)  Wt (!) 275 lb (124.7 kg)  BMI 34.37 kg/m2    Physical Exam  General Appearance:  Alert, well-appearing, in no acute distress.     HEENT:  Atraumatic, normocephalic; no scleral icterus, EOM intact; the mucous membranes were pink and moist.   Chest: Chest symmetric, spine straight.  Right subclavian ICD site with no sign of infection or tissue thinning.     Lungs:   Respirations unlabored; the lungs are clear to auscultation.   Cardiovascular:   Auscultation reveals normal first and second heart sounds with no murmurs, rubs, or gallops.  Regular rate and rhythm. No JVD.  Radial and posterior tibial pulses are intact.    Abdomen:  Soft, nontender, nondistended, bowel sounds present.     Extremities: No cyanosis, clubbing, or edema.   Musculoskeletal:  Moves all extremities.     Skin: No xanthelasma.   Neurologic: Mood and affect are appropriate. Oriented to person, place, time, and situation.       Cardiographics (personally reviewed)  ECG 10/4/2017 shows atrial pacing with intrinsic ventricular conduction at 71 bpm, QT/QTc interval measures 432/469 ms  Post cardioversion ECG done 6/16/17 shows atrial pacing with intrinsic ventricular conduction at 88 bpm, QT/QTc interval measures 412/498 ms    Echocardiogram: Done 6/19/17    When compared to the previous study dated 9/21/2015, no significant " change.    Left ventricle ejection fraction is mildly decreased. The estimated left ventricular ejection fraction is 50%.    Left ventricular cavity is mildly increased. Mild hypertrophy noted.    Moderate biatrial enlargement.    Mild pulmonary hypertension present.    ICD Interrogation 9/7/17:  Pacing mode: MVP  bpm  Presenting rhythm: AP-VS 70s, rare PVCs  Underlying rhythm: SB 30s-60s, 1st degree AVB  A-paced: 91.95%.  V-paced 8.2%.  Battery: estimated longevity 5.9 years.  Atrial and Ventricular leads: Stable with good sensing, impedance, and pacing thresholds  Arrhythmias: 10 AT/AF, longest 66 hrs, others < 1 min,  controlled ventricular response.  1 6-beat NSVT  [place]: 4.3%  Histograms: Good rate distribution  Heart failure diagnostics: No indication of fluid overload.  OptiVol below threshold and thoracic impedance below reference line and then back towards reference line    Lab Review   Lab Results   Component Value Date    CREATININE 1.02 10/06/2017    BUN 18 10/06/2017     10/06/2017    K 4.2 10/06/2017     10/06/2017    CO2 26 10/06/2017     Lab Results   Component Value Date    WBC 6.1 10/06/2017    HGB 11.9 (L) 10/06/2017    HCT 36.0 (L) 10/06/2017    MCV 96 10/06/2017     10/06/2017     Lab Results   Component Value Date    INR 1.85 (H) 10/06/2017    INR 2.70 (H) 10/04/2017    INR 2.80 (!) 10/03/2017         Greater than than 40 minutes were spent face to face in this visit with more than 50% spent discussing diagnoses as listed above, counseling, and coordination of care.    Lauren Foster, [facility], Electrophysiology  [phone]    This note has been dictated using voice recognition software. Any grammatical, typographical, or context distortions are unintentional and inherent to the software.

## 2021-06-13 NOTE — PROGRESS NOTES
INR 1.2 at home. Pt restarted Warfarin on 10/20 after hospital stay will hemoptysis. Will increase warfarin tonight to 5 mg and then return to current dose. Retest in one week. After talking with pt and discussing history of greens/salads and medication change. Pt will  continue  with current diet and dosing of Warfarin.  Continue with moderation of Vit K and green leafy vegetables. Cautioned to call with increase bruising or bleeding. Reminded to call with medication change especially antibiotic. Call with any questions or concerns or any up coming procedures. Cautioned about using Herbal medication.

## 2021-06-13 NOTE — PROGRESS NOTES
Pulmonary Clinic Outpatient Consultation    Assessment and Plan:   Buck Sinclair is a 72 y.o. male with a history of CAD s/p PCI/stents, PAF on anticoagulation, presented 10/4 with hemoptysis, found to have cavitary lesion and extensive pneumonia in LLL. He has responded to antibiotics and has had significant symptomatic and radiographic improvement. The CT scan today still shows a lesion in the LLL which could be resolving infection vs. Organizing pneumonia. The optimal duration of antibiotic therapy for a lung abscess is unclear but given that he still has some abnormalities in the LLL I would like to extend his course by an additional 2 weeks for a total of 6 weeks of antibiotics (with 4 weeks being the bare minimum). I expect him to continue to improve and he will need another CT scan in 6 months to assess for resolution.    Recommendations:  -Cont Augmentin for 6 weeks total duration of therapy (should finish up sometime before thanksgiving)  -Repeat CT chest without contrast in 6 months  -For COPD, he will continue his ICS/LABA and LAMA  -he is up to date with flu vaccine; he is not sure if he's had pneumonia vaccination and will ask his PMD  -Full set of PFTs at next visit to assess lung function    I appreciate the opportunity to participate in the care of Mr. Sinclair.  Please feel free to contact me at any time.    CCx: follow up of lung abscess    HPI: 72M  With history of CAD s/p PCI/stents, PAF on anticoagulation, presented 10/4 with hemoptysis, found to have cavitary lesion and extensive pneumonia in LLL. Underwent bronch with BAL 10/6, all studies negative including AFB and fungal testing. Treated for lung abscess with Augmentin, has been on antibiotics for 2 weeks thus far. He is having some diarrhea related to the antibiotic but is taking a Probiotic. He repeat CT scan today showing improvement with air-fliuid level in the LLL cavity.  No further hemoptysis. No fevers or chills. He is back on  warfarin for Afib and the Plavix for his recent stent procedure.    ROS:  A 12-system review was obtained and was negative with the exception of the symptoms endorsed in the history of present illness.    PMH:  Past Medical History:   Diagnosis Date     Anemia      BPH (benign prostatic hyperplasia)      Cardiomyopathy      COPD (chronic obstructive pulmonary disease)      Coronary artery disease      Dyslipidemia, goal LDL below 100      Essential hypertension      High cholesterol      History of transfusion      Persistent atrial fibrillation      Skin cancer of trunk      Status post catheter ablation of atrial fibrillation 6/7/2017    PVI 4-2011 (Cryo/PVI + roof line + CTI line) Re-do PVI 7-2011 (RFA/PVI + CFE + VIDYA + confirmed CTI line)     Ventricular tachycardia        PSH:  Past Surgical History:   Procedure Laterality Date     CARDIAC DEFIBRILLATOR PLACEMENT       CARDIOVERSION      x20, last 2/12/15, 10/2015, 11/18/16, 6/16/17 by Lauren oFster CNP     COLONOSCOPY N/A 4/28/2017    Procedure: COLONOSCOPY with 2 ascending polyps and 1 transverse polyp;  Surgeon: Jose Whittington MD;  Location: [facility];  Service:      CV CORONARY ANGIOGRAM N/A 9/20/2017    Procedure: Coronary Angiogram;  Surgeon: Sergio Cervantes MD;  Location: [facility];  Service:      EP ICD INSERT       FRACTURE SURGERY Left     wrist     INGUINAL HERNIA REPAIR Left 1967    while in the [employer] in [place] after 13 month in [place]     INSERT / REPLACE / REMOVE PACEMAKER       DC ABLATE HEART DYSRHYTHM FOCUS  04/2011    Catheter Ablation Atrial Fibrillation PVI Apr 2011 (Cryo+RF-PVI + roof line + CTI line)     DC ABLATE HEART DYSRHYTHM FOCUS  07/2011    Re-do PVI Jul 2011 (RFA-PVI + CFE + VIDYA + confirmation of CTI line)     DC MYERS W/O FACETEC FORAMOT/DSKC 1/2 VRT SEG, CERVICAL      Laminectomy Lumbar;  Recorded: 03/09/2012;     TOTAL SHOULDER REPLACEMENT Right 03/03/2016    Dr. Abernathy of [facility]        Allergies:  Allergies   Allergen Reactions     Adhesive Other (See Comments)     ADHESIVE TAPE; SKIN IRRITATION     Amiodarone      ADVERSE REACTION.  Sunlight sensitivity.     Lisinopril Cough       Family HX:  Family History   Problem Relation Age of Onset     Cancer Mother      leukemia     Cancer Father      bladder     Cancer Sister        Social Hx:  Social History     Social History     Marital status:      Spouse name: Neela     Number of children: N/A     Years of education: 12     Occupational History      Retired     [employer]      [employer]     Social History Main Topics     Smoking status: Former Smoker     Types: Cigarettes     Quit date: 1/1/1968     Smokeless tobacco: Never Used     Alcohol use 1.2 oz/week     2 Cans of beer per week      Comment: 1 beer per week     Drug use: No     Sexual activity: Yes     Partners: Female     Other Topics Concern     Not on file     Social History Narrative       Current Meds:  Current Outpatient Prescriptions   Medication Sig Dispense Refill     ACETAMINOPHEN (TYLENOL ORAL) Take 1,000 mg by mouth 2 (two) times a day.        amoxicillin-clavulanate (AUGMENTIN) 875-125 mg per tablet Take 1 tablet by mouth 2 (two) times a day. 56 tablet 0     ascorbic acid (VITAMIN C) 1000 MG tablet Take 1,000 mg by mouth daily.       atorvastatin (LIPITOR) 40 MG tablet Take 40 mg by mouth daily.       budesonide-formoterol (SYMBICORT) 160-4.5 mcg/actuation inhaler Inhale 2 puffs 2 (two) times a day.       CALCIUM POLYCARBOPHIL (FIBER-LAX ORAL) Take 5 mg by mouth 2 (two) times a day.        clopidogrel (PLAVIX) 75 mg tablet Take 1 tablet (75 mg total) by mouth daily. 30 tablet 11     co-enzyme Q-10 30 mg capsule Take 100 mg by mouth daily.       diltiazem (CARTIA XT) 240 MG 24 hr capsule Take 1 capsule (240 mg total) by mouth daily. 90 capsule 1     losartan (COZAAR) 50 MG tablet Take 1 tablet (50 mg total) by mouth daily. 90 tablet 5     MAG 64 64 mg  TbEC delayed-release tablet TAKE 1 TABLET BY MOUTH TWICE DAILY 180 tablet 2     multivitamin (MULTIVITAMIN) per tablet Take 1 tablet by mouth daily.       polyethylene glycol (MIRALAX) 17 gram packet Take 17 g by mouth daily.       sotalol (BETAPACE) 120 MG tablet TAKE 1 TABLET(120 MG) BY MOUTH TWICE DAILY 180 tablet 1     tiotropium (SPIRIVA) 18 mcg inhalation capsule Place 18 mcg into inhaler and inhale daily.       vardenafil (LEVITRA) 10 MG tablet Take 10 mg by mouth daily as needed for erectile dysfunction.       VITAMIN D2 50,000 unit capsule Take 50,000 Units by mouth 2 (two) times a week. SUN and WED  3     No current facility-administered medications for this visit.        Physical Exam:  There were no vitals taken for this visit.  Gen: awake, alert, oriented, no distress  HEENT: nasal turbinates are unremarkable, no oropharyngeal lesions, no cervical or supraclavicular lymphadenopathy  CV: RRR, no M/G/R  Resp: CTAB, no focal crackles or wheezes  Abd: soft, nontender, no palpable organomegaly  Skin: no apparent rashes  Ext: no cyanosis, clubbing or edema  Neuro: alert, nonfocal    Labs:  reviewed  procal negative  ESR 17, CRP wnl  BAL fungal cx grew C. Albicans, likely contaminant  AFB, routine cx negative  Viral culture pending  Histo testing negative  Blastomyces Ag negative  CANCA negative  DONNA, RF negative  HIV negative    Imaging studies:  CT chest: IMPRESSION:   CONCLUSION:  1.  Significant overall improvement with resolution of inflammatory infiltrates and groundglass opacity. No new abnormality.  2.  Previously identified 10 mm left lower lobe cavitary lesion stable in size and now appears fluid-filled, resolution of the surrounding pneumonia. Likely this is inflammatory but should be followed. Recommend a repeat CT in 6 months.    PFT's not available    Hugh Payton MD  [facility]  Pager [phone]  Clinic [phone]

## 2021-06-13 NOTE — TELEPHONE ENCOUNTER
ANTICOAGULATION  MANAGEMENT-Patient Home Monitoring Result    Assessment     Therapeutic INR result of 2.4 (goal INR of 2.0-3.0) received via fax from [employer] home INR monitoring company.        Previous INR was Therapeutic    Buck Sinclair last contacted by phone: 10/27/2020    Plan     Per home monitoring agreement with patient, patient was NOT contacted regarding therapeutic result today.  Patient is to continue current dose and continue to check INR with home monitor per protocol.  ?   Gisele Prince RN    Subjective/Objective:      Buck Sinclair, a 75 y.o. male  is established on warfarin using a home INR monitor.    Anticoagulation Episode Summary     Current INR goal:  2.0-3.0   TTR:  95.2 % (1 y)   Next INR check:  12/8/2020   INR from last check:  2.40 (12/1/2020)   Weekly max warfarin dose:     Target end date:     INR check location:     Preferred lab:     Send INR reminders to:  [facility]    Indications    Persistent Atrial Fibrillation [I48.91]           Comments:  home monitor talk to wife about dose [facility]   [phone]            Anticoagulation Care Providers     Provider Role Specialty Phone number    Erica Hansen CNP  Cardiology [phone]    Everett Renee MD  Cardiology [phone]

## 2021-06-13 NOTE — TELEPHONE ENCOUNTER
ANTICOAGULATION  MANAGEMENT-Patient Home Monitoring Result    Assessment     Therapeutic INR result of 2.4 (goal INR of 2.0-3.0) received via fax from [employer] home INR monitoring company.        Previous INR was Therapeutic    Buck Sinclair last contacted by phone: 10/27/2020    Plan     Per home monitoring agreement with patient, patient was NOT contacted regarding therapeutic result today.  Patient is to continue current dose and continue to check INR with home monitor per protocol.  ?   Gisele Prince RN    Subjective/Objective:      Buck Sinclair, a 75 y.o. male  is established on warfarin using a home INR monitor.    Anticoagulation Episode Summary     Current INR goal:  2.0-3.0   TTR:  95.2 % (1 y)   Next INR check:  11/24/2020   INR from last check:  2.40 (11/17/2020)   Weekly max warfarin dose:     Target end date:     INR check location:     Preferred lab:     Send INR reminders to:  [facility]    Indications    Persistent Atrial Fibrillation [I48.91]           Comments:  home monitor talk to wife about dose [facility]   [phone]            Anticoagulation Care Providers     Provider Role Specialty Phone number    Erica Hansen CNP  Cardiology [phone]    Everett Renee MD  Cardiology [phone]

## 2021-06-13 NOTE — TELEPHONE ENCOUNTER
Wellness Screening Tool  Symptom Screening:  Do you have one of the following NEW symptoms:    Fever (subjective or >100.0)?  No    A new cough?  No    Shortness of breath?  No     Chills? No     New loss of taste or smell? No     Generalized body aches? No     New persistent headache? No     New sore throat? No     Nausea, vomiting, or diarrhea?  No    Within the past 2 weeks, have you been exposed to someone with a known positive illness below:    COVID-19 (known or suspected)?  No    Chicken pox?  No    Mealses?  No    Pertussis?  No    Patient notified of visitor policy- No visitors are allowed to accompany the patient at this time. Yes  Pt informed to wear a mask: Yes  Pt notified if they develop any symptoms listed above, prior to their appointment, they are to call the clinic directly at [phone] for further instructions.  Yes  Patient's appointment status: Patient will be seen in clinic as scheduled on 12/16

## 2021-06-14 NOTE — PROGRESS NOTES
ITP ASSESSMENT   Assessment Day: 30 Day  Session Number: 4  Precautions: Standard  Diagnosis: Stent  Risk Stratification: High  Referring Provider: Dr. Cervantes  EXERCISE  Exercise Assessment: Reassessment     Tolerates 40' of ex at 2.3-2.4 Mets                         Exercise Plan  Goals Next 30 days  ADL'S: Take out gargbage  Leisure: Increase  going to [facility] 2-3 x week  Work: Put in Kitchen Cabinents;  Put in Dining room  Floor, with help    Education Goals: Patient can state cardiac s/s and appropriate emergency response.;Has system for taking medication.;Medication review;All goals in this section met  Education Goals Met: Patient can state cardiac s/s and appropriate emergency response.;Has system for taking medication.                        Goals Met  Initial ADL's goals met: Does do light household tasks  Initial Leisure goals met: Goes to the [facility], about 1 x week;    Intial Work goals met: Has started light remodeling tasks   Initial Progression: Making progress;  Still reports fatigue, and is limited by Knee discomfort    Exercise Prescription  Exercise Mode: Treadmill;Bike;Arm Erg.  Frequency: 2 x week  Duration: 40'  Intensity / THR: 20-30 beats above resting heart rate  RPE 11-14  Progression / Met level: 2.3-3   Resistive Training?: No    Current Exercise (mins/week): 80    Interventions  Home Exercise:  Mode: Wlak/Bike  Frequency: 3 x week  Duration: 30'    Education Material : Educational videos;Provide written material;Individual education and counseling;Offer educational classes    Education Completed  Exercise Education Completed: Cardiac Anatomy;Signs and Symptoms;Medication review;RPE;Emergency Plan;Home Exercise;Warm up/cool down;FITT Principles;BP/HR Reponse to exercise;Benefits of Exercise;End point of exercise            Exercise Follow-up/Discharge  Follow up/Discharge: Encouraged increased home ex. and consistency with attending the [facility]          "NUTRITION  Nutrition Assessment: Reassessment    Nutrition Risk Factors:  Nutrition Risk Factors: Dyslipidemia;Overweight  Cholesterol: 143 (9/14/17)  LDL: 78  HDL: 51  Triglycerides: 69    Nutrition Plan  Interventions  Nutrition Interventions: Therapist/Patient discussion;Educational videos;Provide with written material    Education Completed  Nutrition Education Completed: Risk factor overview    Goals  Nutrition Goals (Next 30 days): Patient can identify their risk factors for CAD;Patient will follow a low sodium diet;Patient will follow a low saturated fat diet;Patient knows appropriate portion size;Patient will lose weight    Goals Met  Nutrition Goals Met: Completed Nutritional Risk Screen;Provided Rate your Plate Survey;Reviewed Dietitian schedule    Height, Weight, and  BMI  Weight: 274 lb (124.3 kg)  Height: 6' 3\" (1.905 m)  BMI: 34.25    Nutrition Follow-up  Follow-up/Discharge: Pt given Rate Your Plate Survey. Once returned will set up a meeting with the dietician.     Other Risk Factors  Other Risk Factor Assessment: Reassessment    HTN Risk Factor: Hypertension    Pre Exercise BP: 118/76  Post Exercise BP: 112/70    Hypertension Plan  Goals  HTN Goals: Follow low sodium diet;Exercises regularly    Goals Met  HTN Goals Met: Take medication as prescribed    HTN Interventions  HTN Interventions: Therapist/patient discussion;Provide written material;Offer educational videos;Offer educational classes    HTN Education Completed  HTN Education Completed: Risk factor overview    Tobacco Risk Factor: NA      Tobacco Education Completed  No Data Recorded    Risk Factor Follow-up   Follow-up/Discharge: Provide ed. as needed       PSYCHOSOCIAL  Psychosocial Assessment: Reassessment     Garcia GONZALES Q of L Summary Score: 18    CATRACHO-D Score: 1    Psychosocial Risk Factor: NA    Psychosocial Plan  Interventions  Interventions: Offer educational videos and classes;Provide written material;Individual education and " counseling    Education Completed  Education Completed: Relaxation/Coping Techniques;S/S of depression;Effects of stress on body    Goals  Goals (Next 30 days): Practicing stress management skills    Goals Met  Goals Met: Identified Support system;Oriented to stress management classes;Identify stressors    Psychosocial Follow-up  Follow-up/Discharge: Encouraged pt to attend ed. classes as  needed         Patient involved in Goal setting?: Yes    Signature: _____________________________________________________________    Date: __________________    Time: __________________

## 2021-06-14 NOTE — TELEPHONE ENCOUNTER
ANTICOAGULATION  MANAGEMENT-Patient Home Monitoring Result    Assessment     Therapeutic INR result of 2.5 (goal INR of 2.0-3.0) received via fax from [employer] home INR monitoring company.        Previous INR was Therapeutic    Buck Sinclair last contacted by phone: 1/5/2021    Plan     Per home monitoring agreement with patient, patient was NOT contacted regarding therapeutic result today.  Patient is to continue current dose and continue to check INR with home monitor per protocol.  ?   Gisele Prince RN    Subjective/Objective:      Buck Sinclair, a 75 y.o. male  is established on warfarin using a home INR monitor.    Anticoagulation Episode Summary     Current INR goal:  2.0-3.0   TTR:  96.6 % (1 y)   Next INR check:  2/2/2021   INR from last check:  2.50 (1/26/2021)   Weekly max warfarin dose:     Target end date:     INR check location:     Preferred lab:     Send INR reminders to:  [facility] CARE    Indications    Persistent Atrial Fibrillation [I48.91]           Comments:  home monitor talk to wife about dose [facility]   [phone]            Anticoagulation Care Providers     Provider Role Specialty Phone number    Erica Hansen CNP  Cardiology [phone]    Everett Renee MD  Cardiology [phone]

## 2021-06-14 NOTE — PROGRESS NOTES
INR at home 2.4 Continue current management dosing of Warfarin. Continue  diet of moderate Vitamin K intake. Discussed with pt the need to call with questions or concerns or any change in medication especially herbal medication or OTC. Call with increased bleeding or bruising or any upcoming procedures.

## 2021-06-14 NOTE — PROGRESS NOTES
INR 2.7 Continue current management dosing of Warfarin. Continue  diet of moderate Vitamin K intake. Discussed with pt the need to call with questions or concerns or any change in medication especially herbal medication or OTC. Call with increased bleeding or bruising or any upcoming procedures.

## 2021-06-14 NOTE — TELEPHONE ENCOUNTER
Received a faxed INR result for Buck Sinclair  From [facility]  INR result dated 12/29/2020 is 3.2

## 2021-06-14 NOTE — PROGRESS NOTES
AMULET Study     Purpose: To evaluate the safety and effectiveness of the Amulet device by demonstrating its performance is non-inferior to the commercially available [employer] CHARLY closure (LAAC) device (Control) in subjects with non-valvular atrial fibrillation.     Consent process:  I met with Buck Sinclair today, 11/30/17 to discuss participation in the AMULET study.     The study discussion included:    introduction of the study purpose and     the qualifications for participation    The study devices and randomization    review of the study procedures    risks and side effects    benefits (if any)    voluntary nature of participation    confidentiality of records    financial considerations   were also included in the discussion.     Subject was provided with a copy of the consent form to review and time to consider participation.   Questions were answered to their satisfaction.    Plan: I will meet with the patient on 12-4 prior to his baseline KANDICE procedure     Monique Giron

## 2021-06-14 NOTE — PROGRESS NOTES
ITP ASSESSMENT   Assessment Day: Initial  Session Number: 1  Precautions: Standard  Diagnosis: Stent  Risk Stratification: High  Referring Provider: Sergio Cervantes MD   ITP: Dr. Cervantes  EXERCISE  Exercise Assessment: Initial     Pt exercised for 10 minutes at 2.3 mets without any complaints.                         Exercise Plan  Goals Next 30 days  ADL'S: Resume light to moderate house work without fatigue  [facility]: [facility]  Work: Remodel Kitchen    Education Goals: Medication review  Education Goals Met: Patient can state cardiac s/s and appropriate emergency response.;Has system for taking medication.    Exercise Prescription  Exercise Mode: Treadmill;Nustep;Bike;Arm Erg.  Frequency: 2x/week  Duration: 30-40 min  Intensity / THR: 20-30 beats above resting heart rate  RPE 11-14  Progression / Met level: 2.3-3  Resistive Training?: No (Will Start)    Current Exercise (mins/week): 5    Interventions  Home Exercise:  Mode: Walk/Bike  Frequency: 3-4x/week  Duration: 20-30 min    Education Material : Educational videos;Provide written material;Individual education and counseling;Offer educational classes    Education Completed  Exercise Education Completed: Cardiac Anatomy;Signs and Symptoms;Medication review;RPE;Emergency Plan;Home Exercise;Warm up/cool down;FITT Principles;BP/HR Reponse to exercise;Benefits of Exercise;End point of exercise            Exercise Follow-up/Discharge  Follow up/Discharge: Encouraged to start home exercise program. Pt is active at home with many projects. Discussed difference between exercise and activity. NUTRITION  Nutrition Assessment: Initial    Nutrition Risk Factors:  Nutrition Risk Factors: Dyslipidemia;Overweight  Cholesterol: 143 (9/14/17)  LDL: 78  HDL: 51  Triglycerides: 69    Nutrition Plan  Interventions  Nutrition Interventions: Therapist/Patient discussion;Educational videos;Provide with written material    Education Completed  Nutrition  "Education Completed: Risk factor overview    Goals  Nutrition Goals (Next 30 days): Patient can identify their risk factors for CAD;Patient will follow a low sodium diet;Patient will follow a low saturated fat diet;Patient knows appropriate portion size;Patient will lose weight    Goals Met  Nutrition Goals Met: Completed Nutritional Risk Screen;Provided Rate your Plate Survey;Reviewed Dietitian schedule    Height, Weight, and  BMI  Weight: 275 lb (124.7 kg)  Height: 6' 3\" (1.905 m)  BMI: 34.37    Nutrition Follow-up  Follow-up/Discharge: Pt given Rate Your Plate Survey. Once returned will set up a meeting with the dietician.       Other Risk Factors  Other Risk Factor Assessment: Initial    HTN Risk Factor: Hypertension    Pre Exercise BP: 130/87  Post Exercise BP: 136/90    Hypertension Plan  Goals  HTN Goals: Follow low sodium diet;Exercises regularly    Goals Met  HTN Goals Met: Take medication as prescribed    HTN Interventions  HTN Interventions: Therapist/patient discussion;Provide written material;Offer educational videos;Offer educational classes    HTN Education Completed  HTN Education Completed: Risk factor overview    Tobacco Risk Factor: NA    Risk Factor Follow-up   Follow-up/Discharge: Continue to offer support and education on risk factor modification.   PSYCHOSOCIAL  Psychosocial Assessment: Initial     [facility] Q of L Summary Score: 18    CATRACHO-D Score: 1    Psychosocial Risk Factor: NA    Psychosocial Plan  Interventions  If CATRACHO-D > 15 send letter to MD  Interventions: Offer educational videos and classes;Provide written material;Individual education and counseling    Education Completed  Education Completed: Relaxation/Coping Techniques;S/S of depression;Effects of stress on body    Goals  Goals (Next 30 days): Practicing stress management skills    Goals Met  Goals Met: Identified Support system;Oriented to stress management classes;Identify stressors    Psychosocial " Follow-up  Follow-up/Discharge: Pt states he has a normal amount of stress. States he feels he manages his stress well and has a good support system.       Patient involved in Goal setting?: Yes    Signature: _____________________________________________________________    Date: __________________    Time: __________________

## 2021-06-14 NOTE — PROGRESS NOTES
LAAO device education was completed:     See documented H&P completed by NP in EPIC    Reviewed pre and post op KANDICE/Watchman procedure with pt, pre-procedure letter reviewed and given to pt- see letter in EPIC under documentation, see additional LAAO devic prep note for details on procedure/prep, risks of KANDICE/LAAO discussed, answered pt questions and concerns regarding procedure, time spent with pt was >45min and reviewed available pre-op lab results.    Discussed importance of continuing anticoagulation uninterrupted pre and post LAAO, pt denies missing any doses of their anticoagulant, and pt denies issues with asencio placement in the past.  Instructed patient that they will be contacted post KANDICE for further discussion regarding the Watchman procedure.       MODIFIED SHELDON SCALE   No prior CVA    Score Description  0 No symptoms at all  1 No significant disability despite symptoms; able to carry out all usual duties and activities  2 Slight disability; unable to carry out all previous activities, but able to look after own affairs without assistance  3 Moderate disability; requiring some help, but able to walk without assistance  4 Moderately severe disability; unable to walk without assistance and unable to attend to own bodily needs without assistance  5 Severe disability; bedridden, incontinent and requiring constant nursing care and attention  6 Dead     Total score (0 - 6): 0      Please see additional anticoagulation note for INR/Coumadin dosing.    Contact information reviewed and pt states understanding.

## 2021-06-14 NOTE — PROGRESS NOTES
INR 2.4 at home. After talking with pt and discussing history of greens/salads and medication change. Pt will  continue  with current diet and dosing of Warfarin.  Continue with moderation of Vit K and green leafy vegetables. Cautioned to call with increase bruising or bleeding. Reminded to call with medication change especially antibiotic. Call with any questions or concerns or any up coming procedures. Cautioned about using Herbal medication.

## 2021-06-14 NOTE — TELEPHONE ENCOUNTER
ANTICOAGULATION  MANAGEMENT-Patient Home Monitoring Result    Assessment     Therapeutic INR result of 3.0 (goal INR of 2.0-3.0) received via fax from [employer] home INR monitoring company.        Previous INR was Therapeutic    Buck Sinclair last contacted by phone: 1/5/2021    Plan     Per home monitoring agreement with patient, patient was NOT contacted regarding therapeutic result today.  Patient is to continue current dose and continue to check INR with home monitor per protocol.  ?   Gisele Prince RN    Subjective/Objective:      Buck Sinclair, a 75 y.o. male  is established on warfarin using a home INR monitor.    Anticoagulation Episode Summary     Current INR goal:  2.0-3.0   TTR:  95.5 % (1 y)   Next INR check:  1/26/2021   INR from last check:  3.00 (1/19/2021)   Weekly max warfarin dose:     Target end date:     INR check location:     Preferred lab:     Send INR reminders to:  [facility]    Indications    Persistent Atrial Fibrillation [I48.91]           Comments:  home monitor talk to wife about dose [facility]   [phone]            Anticoagulation Care Providers     Provider Role Specialty Phone number    Erica Hansen CNP  Cardiology [phone]    Everett Renee MD  Cardiology [phone]

## 2021-06-14 NOTE — PROGRESS NOTES
Cardiac Rehab  Phase II Assessment    Assessment Date: 11/15/17    Diagnosis: Stent x3  Date of Onset: 9/20/17  ICD/Pacemaker: Yes Parameters: ICD  Post-op Complications: None - Pneumonia  ECG History: Atrial Paced, PAF, SSS, VT EF%:53%  Past Medical History:   Past Medical History:   Diagnosis Date     Anemia      BPH (benign prostatic hyperplasia)      COPD (chronic obstructive pulmonary disease)      Coronary artery disease due to calcified coronary lesion      Dyslipidemia, goal LDL below 70      Essential hypertension      History of cardiomyopathy      History of transfusion      Persistent atrial fibrillation      Pneumonia of left lower lobe due to infectious organism 10/4/2017     Skin cancer of trunk      Status post catheter ablation of atrial fibrillation 6/7/2017    PVI 4-2011 (Cryo/PVI + roof line + CTI line) Re-do PVI 7-2011 (RFA/PVI + CFE + VIDYA + confirmed CTI line)     Ventricular tachycardia      Patient Active Problem List   Diagnosis     Dyslipidemia, goal LDL below 70     Essential hypertension     Persistent Atrial Fibrillation     History of sick sinus syndrome     ICD (implantable cardioverter-defibrillator), dual, in situ     Status post catheter ablation of atrial fibrillation     Coronary artery disease due to calcified coronary lesion     COPD without exacerbation     Physical Assessment  Precautions/ Physical Limitations: Right Knee discomfort, Right Shoulder Replaced  Oxygen: No  O2 Sats: 95% Lung Sounds: Clear Edema: None  Incisions: Healed  Sleeping Pattern: good   Appetite: good   Nutrition Risk Screen: Weight loss    Pain  Location: None    Psychosocial/ Emotional Health  1. In the past 12 months, have you been in a relationship where you have been abused physically, emotionally, sexually or financially? No  notified: NA  2. Who do you turn to for emotional support?: Wife, Family  3. Do you have cultural or spiritual needs? No  4. Have there been any major life  "changes in the past 12 months? No    Referral Information  Primary Physician: Vivek Guerrero MD  Cardiologist: Dl Conn - Possible Watchman    Home exercise/Equipment: [facility]    Patient's long-term goal(s): \"Build up stamina in legs\"    1. Living Accommodations: Home Steps: Yes      Support people at home: Wife   2. Marital Status:   3. Family is able to assist with cares  4. Recreation/Hobbies: Deer Hunting, Cabin, Work Shop, [employer]    "

## 2021-06-14 NOTE — TELEPHONE ENCOUNTER
Received a faxed INR result for Buck Sinclair  From [facility]  INR result dated 1/5/2021 is 2.7

## 2021-06-14 NOTE — TELEPHONE ENCOUNTER
ANTICOAGULATION  MANAGEMENT-Patient Home Monitoring Result    Assessment     Therapeutic INR result of 2.7 (goal INR of 2.0-3.0) received via fax from [employer] home INR monitoring company.        Previous INR was Therapeutic    Buck Sinclair last contacted by phone: 1/5/2021    Plan     Per home monitoring agreement with patient, patient was NOT contacted regarding therapeutic result today.  Patient is to continue current dose and continue to check INR with home monitor per protocol.  ?   Gisele Prince RN    Subjective/Objective:      Buck Sinclair, a 75 y.o. male  is established on warfarin using a home INR monitor.    Anticoagulation Episode Summary     Current INR goal:  2.0-3.0   TTR:  94.5 % (1 y)   Next INR check:  1/26/2021   INR from last check:  2.70 (1/12/2021)   Weekly max warfarin dose:     Target end date:     INR check location:     Preferred lab:     Send INR reminders to:  [facility] CARE    Indications    Persistent Atrial Fibrillation [I48.91]           Comments:  home monitor talk to wife about dose [facility]   [phone]            Anticoagulation Care Providers     Provider Role Specialty Phone number    Erica Hansen CNP  Cardiology [phone]    Everett Renee MD  Cardiology [phone]

## 2021-06-14 NOTE — TELEPHONE ENCOUNTER
Received a faxed INR result for Buck Sinclair  From [facility]  INR result dated 2/2/2021 is 3.3

## 2021-06-14 NOTE — TELEPHONE ENCOUNTER
ANTICOAGULATION  MANAGEMENT    Assessment     Today's INR result of 2.7 is Therapeutic (goal INR of 2.0-3.0)        Warfarin taken as previously instructed    Increased greens/vitamin K intake may be affecting INR - back to routine.    No new medication/supplements affecting INR    Continues to tolerate warfarin with no reported s/s of bleeding or thromboembolism     Previous INR was Supratherapeutic    Plan:     Spoke on phone with patient's spouse Neela regarding INR result and instructed:     Warfarin Dosing Instructions:  Continue current warfarin dose    5 mg every Mon, Fri; 2.5 mg all other days      (0 % change)    Instructed patient to follow up no later than: 1-2 weeks with home monitor.    Education provided: importance of therapeutic range, target INR goal and significance of current INR result and importance of following up for INR monitoring at instructed interval    Neela verbalizes understanding and agrees to warfarin dosing plan.    Instructed to call the [facility] for any changes, questions or concerns. (#[phone])   ?   Gisele Prince RN    Subjective/Objective:      Buck Sinclair, a 75 y.o. male is on warfarin.     Buck reports:     Home warfarin dose: verbally confirmed home dose with Neela and updated on anticoagulation calendar     Missed doses: No     Medication changes:  No     S/S of bleeding or thromboembolism:  No     New Injury or illness:  No     Changes in diet or alcohol consumption:  Yes: back to normal eating routine after the holidays.     Upcoming surgery, procedure or cardioversion:  No    Anticoagulation Episode Summary     Current INR goal:  2.0-3.0   TTR:  94.5 % (1 y)   Next INR check:  1/19/2021   INR from last check:  2.70 (1/5/2021)   Weekly max warfarin dose:     Target end date:     INR check location:     Preferred lab:     Send INR reminders to:  [facility]    Indications    Persistent Atrial Fibrillation [I48.91]           Comments:   home monitor talk to wife about dose Neela   [phone]            Anticoagulation Care Providers     Provider Role Specialty Phone number    Erica Hansen CNP  Cardiology [phone]    Everett Renee MD  Cardiology [phone]

## 2021-06-14 NOTE — PROGRESS NOTES
Buck JONES Sinclair has participated in 3 sessions of Phase II Cardiac Rehab.    Progress Report:   Cardiac Rehab Treatment Progress Report 11/15/2017 11/21/2017 11/28/2017   Weight 275 lbs - -   Pre Exercise  HR 69 84 85   Pre Exercise /87 120/78 118/70   Pre Blood Sugar (mg/dl) NA - -   Treadmill Peak HR - 93 93   Nustep Peak Heart Rate - 77 -   Nustep Peak Blood Pressure - 140/80 -   Heart Rate 74 79 78   Post Exercise /90 98/60 116/62   Post Blood Sugar (mg/dl) NA - -   ECG SR/Paced SR/Paced SR/Paced   Total Exercise Minutes 10 40 40         Current Status:  Currently exercising without complaints or symptoms.    If Physician recommends change in treatment plan, please place orders.        __________________________________________________      _____________  Signature                                                                                                  Date

## 2021-06-14 NOTE — TELEPHONE ENCOUNTER
Lab Results   Component Value Date    INR 2.70 (!) 01/12/2021    INR 2.70 (!) 01/05/2021    INR 3.20 (!) 12/29/2020       Patient's current Warfarin doses:     5 mg every Mon, Fri; 2.5 mg all other days           Next INR check is on 1/26/2021      Patient's last OV with HCC was on 11/2/2020    Warfarin prescription 3 month supply and one refill sent to patient's pharmacy today.    Gisele Prince RN

## 2021-06-14 NOTE — TELEPHONE ENCOUNTER
Received a faxed INR result for Buck Sinclair  From [facility]  INR result dated 1/19/2021 is 3.0

## 2021-06-15 ENCOUNTER — TRANSFERRED RECORDS (OUTPATIENT)
Dept: HEALTH INFORMATION MANAGEMENT | Facility: CLINIC | Age: 76
End: 2021-06-15

## 2021-06-15 ENCOUNTER — COMMUNICATION - HEALTHEAST (OUTPATIENT)
Dept: ANTICOAGULATION | Facility: CLINIC | Age: 76
End: 2021-06-15

## 2021-06-15 DIAGNOSIS — I48.91 ATRIAL FIBRILLATION (H): ICD-10-CM

## 2021-06-15 PROBLEM — Z95.810 ICD (IMPLANTABLE CARDIOVERTER-DEFIBRILLATOR) IN PLACE: Chronic | Status: ACTIVE | Noted: 2017-04-05

## 2021-06-15 LAB — INR PPP: 2.2 (ref 0.9–1.1)

## 2021-06-15 NOTE — PROGRESS NOTES
INR at home 2.7 Continue current management dosing of Warfarin. Continue  diet of moderate Vitamin K intake. Discussed with pt the need to call with questions or concerns or any change in medication especially herbal medication or OTC. Call with increased bleeding or bruising or any upcoming procedures.

## 2021-06-15 NOTE — PROGRESS NOTES
INR 3.0 After talking with pt and discussing history of greens/salads and medication change. Pt will  continue  with current diet and dosing of Warfarin.  Continue with moderation of Vit K and green leafy vegetables. Cautioned to call with increase bruising or bleeding. Reminded to call with medication change especially antibiotic. Call with any questions or concerns or any up coming procedures. Cautioned about using Herbal medication.

## 2021-06-15 NOTE — PROGRESS NOTES
ITP ASSESSMENT   Assessment Day: 90 Day  Session Number: 16  Precautions: Standard  Diagnosis: Stent  Risk Stratification: High  Referring Provider: Dr. Cervantes  EXERCISE  Exercise Assessment: Reassessment                          Exercise Plan  Goals Next 30 days  ADL'S: Finish laminte floor in dining room  Leisure: Finish remodeling project    Education Goals: Patient can state cardiac s/s and appropriate emergency response.;Has system for taking medication.;Medication review;All goals in this section met  Education Goals Met: Patient can state cardiac s/s and appropriate emergency response.;Has system for taking medication.                        Goals Met  60 day ADL'S goals met: Started working in shop  60 day Leisure goals met: Continues to work on Remodeling project  60 Day Progression: Continues to progress with construction project    30 day ADL'S goals met: Took out Garbage  30 day Leisure goals met: Started to install kitchen cabinets and Floor  30 day Work goals met: Went to [facility] [employer] 2-3 Days/week  30 Day Progression: Continues to work on construction project. Progressing well.    Initial ADL's goals met: Does do light household tasks  Initial Leisure goals met: Goes to the [facility], about 1 x week;    Intial Work goals met: Has started light remodeling tasks   Initial Progression: Making progress;  Still reports fatigue, and is limited by Knee discomfort    Exercise Prescription  Exercise Mode: Treadmill;Bike;Arm Erg.  Frequency: 2x/week  Duration: 40 minutes  Intensity / THR: 20-30 beats above resting heart rate  RPE 11-14  Progression / Met level: 3.3-3.6  Resistive Training?: No    Current Exercise (mins/week): 180    Interventions  Home Exercise:  Mode: Walking/[facility] Center  Frequency: 3-4days/week  Duration: 30-40 minutes    Education Material : Educational videos;Provide written material;Individual education and counseling;Offer educational classes    Education  "Completed  Exercise Education Completed: Cardiac Anatomy;Signs and Symptoms;Medication review;RPE;Emergency Plan;Home Exercise;Warm up/cool down;FITT Principles;BP/HR Reponse to exercise;Benefits of Exercise;End point of exercise            Exercise Follow-up/Discharge  Follow up/Discharge: Encouraged to continue with exercise 4 or more days/week NUTRITION  Nutrition Assessment: Reassessment    Nutrition Risk Factors:  Nutrition Risk Factors: Dyslipidemia;Overweight  Cholesterol: 143  LDL: 78  HDL: 51  Triglycerides: 69    Nutrition Plan  Interventions  Nutrition Interventions: Diet consult;Completed  Initial Rate Your Plate Score: 46    Education Completed  Nutrition Education Completed: Low Saturated fat diet;Low sodium diet;Weight management    Goals  Nutrition Goals (Next 30 days): Patient will follow a low sodium diet;Patient will follow a low saturated fat diet;Patient will lose weight    Goals Met  Nutrition Goals Met: Patient follows a low sodium diet;Patient states following a low saturated fat diet    Height, Weight, and  BMI  Weight: 278 lb (126.1 kg)  Height: 6' 3\" (1.905 m)  BMI: 34.75    Nutrition Follow-up  Follow-up/Discharge: Patient stated most of his meals are cooked from scratch .  He stated that for weight reduction he \"needs to get moving more\".        Other Risk Factors  Other Risk Factor Assessment: Reassessment    HTN Risk Factor: Hypertension    Pre Exercise BP: 108/76  Post Exercise BP: 104/60    Hypertension Plan  Goals  HTN Goals: Follow low sodium diet;Take medication as prescribed;Exercises regularly    Goals Met  HTN Goals Met: Take medication as prescribed;Exercises regularly;Follow low sodium diet    HTN Interventions  HTN Interventions: Therapist/patient discussion;Provide written material;Offer educational videos;Offer educational classes;Diet consult    HTN Education Completed  HTN Education Completed: Risk factor overview;Medication review;Low sodium diet    Tobacco Risk Factor: " NA    Risk Factor Follow-up   Follow-up/Discharge: Pt met with dietician and discussed heart healthy diet   PSYCHOSOCIAL  Psychosocial Assessment: Reassessment     [facility] Q [facility] L Summary Score: 18    CATRACHO-D Score: 1    Psychosocial Risk Factor: NA    Psychosocial Plan  Interventions  Interventions: Offer educational videos and classes;Provide written material;Individual education and counseling    Education Completed  Education Completed: Relaxation/Coping Techniques;S/S of depression;Effects of stress on body    Goals  Goals (Next 30 days): Practicing stress management skills;Identify stressors;Identified Support system    Goals Met  Goals Met: Identified Support system;Oriented to stress management classes;Identify stressors    Psychosocial Follow-up  Follow-up/Discharge: Pt stated managing stress well. Biggest stressor is the construction project           Patient involved in Goal setting?: Yes    Signature: _____________________________________________________________    Date: __________________    Time: __________________

## 2021-06-15 NOTE — PROGRESS NOTES
ITP ASSESSMENT   Assessment Day: 60 Day  Session Number: 10  Precautions: Standard  Diagnosis: Stent  Risk Stratification: High  Referring Provider: Dr. Cervantes  EXERCISE  Exercise Assessment: Reassessment                          Exercise Plan  Goals Next 30 days  ADL'S: Start working in shop([employer])  Leisure: Finish remodeling project  Work: Put in Kitchen Cabinents;  Put in Dining room  Floor, with help    Education Goals: Patient can state cardiac s/s and appropriate emergency response.;Has system for taking medication.;Medication review;All goals in this section met  Education Goals Met: Patient can state cardiac s/s and appropriate emergency response.;Has system for taking medication.                        Goals Met  30 day ADL'S goals met: Took out Garbage  30 day Leisure goals met: Started to install kitchen cabinets and Floor  30 day Work goals met: Went to [facility] 2-3 Days/week  30 Day Progression: Continues to work on construction project. Progressing well.    Initial ADL's goals met: Does do light household tasks  Initial Leisure goals met: Goes to the [facility], about 1 x week;    Intial Work goals met: Has started light remodeling tasks   Initial Progression: Making progress;  Still reports fatigue, and is limited by Knee discomfort    Exercise Prescription  Exercise Mode: Treadmill;Bike;Arm Erg.  Frequency: 2x/week  Duration: 40 minutes  Intensity / THR: 20-30 beats above resting heart rate  RPE 11-14  Progression / Met level: 3-3.3  Resistive Training?: No    Current Exercise (mins/week): 150    Interventions  Home Exercise:  Mode: Walking/Community Center  Frequency: 3-4 days/week  Duration: 30-40 minutes    Education Material : Educational videos;Provide written material;Individual education and counseling;Offer educational classes    Education Completed  Exercise Education Completed: Cardiac Anatomy;Signs and Symptoms;Medication review;RPE;Emergency Plan;Home  "Exercise;Warm up/cool down;FITT Principles;BP/HR Reponse to exercise;Benefits of Exercise;End point of exercise            Exercise Follow-up/Discharge  Follow up/Discharge: Encouraged to increase exercise to 4 days/week NUTRITION  Nutrition Assessment: Reassessment    Nutrition Risk Factors:  Nutrition Risk Factors: Dyslipidemia;Overweight  Cholesterol: 143  LDL: 78  HDL: 51  Triglycerides: 69    Nutrition Plan  Interventions  Nutrition Interventions: Therapist/Patient discussion;Educational videos;Provide with written material    Education Completed  Nutrition Education Completed: Risk factor overview    Goals  Nutrition Goals (Next 30 days): Patient can identify their risk factors for CAD;Patient will follow a low sodium diet;Patient will follow a low saturated fat diet;Patient knows appropriate portion size    Goals Met  Nutrition Goals Met: Completed Nutritional Risk Screen;Provided Rate your Plate Survey;Reviewed Dietitian schedule;Patient can identify their risk factors for CAD    Height, Weight, and  BMI  Weight: 270 lb (122.5 kg)  Height: 6' 3\" (1.905 m)  BMI: 33.75    Nutrition Follow-up  Follow-up/Discharge: Encouraged pt to meet with dietician       Other Risk Factors  Other Risk Factor Assessment: Reassessment    HTN Risk Factor: Hypertension    Pre Exercise BP: 116/60  Post Exercise BP: 102/62    Hypertension Plan  Goals  HTN Goals: Follow low sodium diet;Take medication as prescribed;Exercises regularly    Goals Met  HTN Goals Met: Take medication as prescribed;Exercises regularly    HTN Interventions  HTN Interventions: Therapist/patient discussion;Provide written material;Offer educational videos;Offer educational classes;Diet consult    HTN Education Completed  HTN Education Completed: Risk factor overview;Medication review    Tobacco Risk Factor: NA    Risk Factor Follow-up   Follow-up/Discharge: Reviewed risk factors, Pts understands.   PSYCHOSOCIAL  Psychosocial Assessment: Reassessment   "     Psychosocial Risk Factor: NA    Psychosocial Plan  Interventions  Interventions: Offer educational videos and classes;Provide written material;Individual education and counseling    Education Completed  Education Completed: Relaxation/Coping Techniques;S/S of depression;Effects of stress on body    Goals  Goals (Next 30 days): Practicing stress management skills    Goals Met  Goals Met: Identified Support system;Oriented to stress management classes;Identify stressors    Psychosocial Follow-up  Follow-up/Discharge: Pt stated managing stress well, Encouraged edu classes           Patient involved in Goal setting?: Yes    Signature: _____________________________________________________________    Date: __________________    Time: __________________

## 2021-06-15 NOTE — TELEPHONE ENCOUNTER
ANTICOAGULATION  MANAGEMENT-Patient Home Monitoring Result    Assessment     Therapeutic INR result of 2.5 (goal INR of 2.0-3.0) received via fax from [employer] home INR monitoring company.        Previous INR was Therapeutic    Buck Sinclair last contacted by phone: 2/9/2021    Plan     Per home monitoring agreement with patient, patient was NOT contacted regarding therapeutic result today.  Patient is to continue current dose and continue to check INR with home monitor per protocol.  ?   Gisele Prince RN    Subjective/Objective:      Buck Sinclair, a 76 y.o. male  is established on warfarin using a home INR monitor.    Anticoagulation Episode Summary     Current INR goal:  2.0-3.0   TTR:  95.2 % (1 y)   Next INR check:  3/9/2021   INR from last check:  2.50 (3/2/2021)   Weekly max warfarin dose:     Target end date:     INR check location:     Preferred lab:     Send INR reminders to:  [facility]    Indications    Persistent Atrial Fibrillation [I48.91]           Comments:  home monitor talk to wife about dose [facility]   [phone]            Anticoagulation Care Providers     Provider Role Specialty Phone number    Erica Hansen CNP  Cardiology [phone]    Everett Renee MD  Cardiology [phone]

## 2021-06-15 NOTE — PROGRESS NOTES
ITP ASSESSMENT   Assessment Day: 120 Day - Discharge  Session Number: 18  Precautions: Standard  Diagnosis: Stent  Risk Stratification: High  Referring Provider: Vivek Guerrero MD   ITP: Dr. Cervantes  EXERCISE  Exercise Assessment: Discharge     Pt currently exercises for 40 minutes at 2.8 mets without any complaints.                         Exercise Plan  Goals Next 30 days  Education Goals: All goals in this section met  Education Goals Met: Patient can state cardiac s/s and appropriate emergency response.;Has system for taking medication.                        Goals Met  90 day ADL'S goals met: Finished laminte arnol in dining room  90 Day Progress: Pt is progressing well towards his goals.     60 day ADL'S goals met: Started working in shop  60 day Leisure goals met: Continues to work on Remodeling project  60 Day Progression: Continues to progress with construction project    30 day ADL'S goals met: Took out Garbage  30 day Leisure goals met: Started to install kitchen cabinets and Floor  30 day Work goals met: Went to [facility] 2-3 Days/week  30 Day Progression: Continues to work on construction project. Progressing well.    Initial ADL's goals met: Does do light household tasks  Initial Leisure goals met[facility];    Intial Work goals met: Has started light remodeling tasks   Initial Progression: Making progress;  Still reports fatigue, and is limited by Knee discomfort    Exercise Prescription  Exercise Mode: Treadmill;Bike;Arm Erg.  Frequency: 2x/week  Duration: 40 minutes  Intensity / THR: 20-30 beats above resting heart rate  RPE 11-14  Progression / Met level: 3.3-3.6  Resistive Training?: No    Current Exercise (mins/week): 180    Interventions  Home Exercise:  Mode: Walk/Community Center  Frequency: 4-7x/week  Duration: 30-60 minutes    Education Material : Educational videos;Provide written material;Individual education and counseling;Offer  "educational classes    Education Completed  Exercise Education Completed: Cardiac Anatomy;Signs and Symptoms;Medication review;RPE;Emergency Plan;Home Exercise;Warm up/cool down;FITT Principles;BP/HR Reponse to exercise;Benefits of Exercise;End point of exercise            Exercise Follow-up/Discharge  Follow up/Discharge: Pt plans to continue exercising at home along with starting a \"breathing program\"  NUTRITION  Nutrition Assessment: Discharge    Nutrition Risk Factors:  Nutrition Risk Factors: Dyslipidemia;Overweight  Cholesterol: 143 (9/14/17)  LDL: 78  HDL: 51  Triglycerides: 69    Nutrition Plan  Interventions  Diet Consult: Completed  Other Nutrition Intervention: Provide with Written Material;Therapist/Pt Discussion  Initial Rate Your Plate Score: 46    Education Completed  Nutrition Education Completed: Low Saturated fat diet;Low sodium diet;Weight management    Goals  Nutrition Goals (Next 30 days): Patient will follow a low sodium diet;Patient will follow a low saturated fat diet;Patient will lose weight    Goals Met  Nutrition Goals Met: Patient follows a low sodium diet;Patient states following a low saturated fat diet    Height, Weight, and  BMI  Weight: 280 lb (127 kg)  Height: 6' 3\" (1.905 m)  BMI: 35    Nutrition Follow-up  Follow-up/Discharge: Patient stated most of his meals are cooked from scratch .  He stated that for weight reduction he \"needs to get moving more\".        Other Risk Factors  Other Risk Factor Assessment: Discharge    HTN Risk Factor: Hypertension    Pre Exercise BP: 114/64  Post Exercise BP: 104/70    Hypertension Plan  Goals  HTN Goals: Patient demonstrates understanding of HTN, no goals identified for the next 30 days    Goals Met  HTN Goals Met: Exercises regularly;Take medication as prescribed;Follow low sodium diet    HTN Interventions  HTN Interventions: Therapist/patient discussion;Provide written material;Offer educational videos;Offer educational classes;Diet " consult    HTN Education Completed  HTN Education Completed: Risk factor overview;Medication review;Low sodium diet    Tobacco Risk Factor: NA    Risk Factor Follow-up   Follow-up/Discharge: Pt met with dietician last week, encouraged to continue following a low sodium diet.   PSYCHOSOCIAL  Psychosocial Assessment: Discharge     [facility] L Summary Score: 20    CATRACHO-D Score: 4    Psychosocial Risk Factor: NA    Psychosocial Plan  Interventions  Interventions: Offer educational videos and classes;Provide written material;Individual education and counseling    Education Completed  Education Completed: Relaxation/Coping Techniques;S/S of depression;Effects of stress on body    Goals  Goals (Next 30 days): Practicing stress management skills;Identify stressors;Identified Support system    Goals Met  Goals Met: Identified Support system;Oriented to stress management classes;Identify stressors    Psychosocial Follow-up  Follow-up/Discharge: Pt states he manages stress well. States he has a support system at home.         Patient involved in Goal setting?: Yes    Signature: _____________________________________________________________    Date: __________________    Time: __________________

## 2021-06-15 NOTE — PROGRESS NOTES
Pt here for a CPFT.  Great efforts saved.  HGB drawn 12/18/2017 and was 13.2 (L).  Test meets ATS criteria.  Albuterol 2.5 mg nebulizer used for bronchodilation.

## 2021-06-15 NOTE — PROGRESS NOTES
INR at home 2.8 Continue current management dosing of Warfarin. Continue  diet of moderate Vitamin K intake. Discussed with pt the need to call with questions or concerns or any change in medication especially herbal medication or OTC. Call with increased bleeding or bruising or any upcoming procedures.

## 2021-06-15 NOTE — TELEPHONE ENCOUNTER
ANTICOAGULATION  MANAGEMENT-Patient Home Monitoring Result    Assessment     Therapeutic INR result of 2.0 (goal INR of 2.0-3.0) received via fax from [employer] home INR monitoring company.        Previous INR was Therapeutic    Buck Sinclair last contacted by phone: 2/9/2021    Plan     Per home monitoring agreement with patient, patient was NOT contacted regarding therapeutic result today.  Patient is to continue current dose and continue to check INR with home monitor per protocol.  ?   Gisele Prince RN    Subjective/Objective:      Buck Sinclair, a 75 y.o. male  is established on warfarin using a home INR monitor.    Anticoagulation Episode Summary     Current INR goal:  2.0-3.0   TTR:  95.2 % (1 y)   Next INR check:  2/23/2021   INR from last check:  2.00 (2/16/2021)   Weekly max warfarin dose:     Target end date:     INR check location:     Preferred lab:     Send INR reminders to:  [facility]    Indications    Persistent Atrial Fibrillation [I48.91]           Comments:  home monitor talk to wife about dose [facility]   [phone]            Anticoagulation Care Providers     Provider Role Specialty Phone number    Erica Hansen CNP  Cardiology [phone]    Everett Renee MD  Cardiology [phone]

## 2021-06-15 NOTE — TELEPHONE ENCOUNTER
ANTICOAGULATION  MANAGEMENT    Assessment     Today's INR result of 2.9 is Therapeutic (goal INR of 2.0-3.0)        Warfarin recently held as instructed which may be affecting INR     Increased greens due to patient had alcohol on Sunday may be be affecting INR    No new medication/supplements affecting INR    Continues to tolerate warfarin with no reported s/s of bleeding or thromboembolism     Previous INR was Supratherapeutic          Plan:     Spoke on phone with patient's spouse Neela regarding INR result and instructed:      Warfarin Dosing Instructions:  Change warfarin dose to    5 mg every Mon; 2.5 mg all other days     (11 %= 0 % change from total taken this past week)    Instructed patient to follow up no later than: one week with home monitor.    Education provided: importance of consistent vitamin K intake, impact of vitamin K foods on INR, vitamin K content of foods, potential interaction between warfarin and alcohol, importance of therapeutic range, target INR goal and significance of current INR result and importance of taking warfarin as instructed    Neela verbalizes understanding and agrees to warfarin dosing plan.    Instructed to call the [facility] for any changes, questions or concerns. (#[phone])   ?   Gisele Prince RN    Subjective/Objective:      Buckbradley Sinclair, a 75 y.o. male is on warfarin. Buck Keita for Buck reports:     Home warfarin dose: verbally confirmed home dose with Neela and updated on anticoagulation calendar     Missed doses: Yes: on 2/2 as instructed     Medication changes:  No     S/S of bleeding or thromboembolism:  No     New Injury or illness:  No     Changes in diet or alcohol consumption:  Yes: had alcoholic drinks on Sun and has increased greens intake. Per Neela the patient only drinks  occasionally.      Upcoming surgery, procedure or cardioversion:  No    Anticoagulation Episode Summary     Current INR goal:  2.0-3.0   TTR:  95.2 % (1 y)    Next INR check:  2/16/2021   INR from last check:  2.90 (2/9/2021)   Weekly max warfarin dose:     Target end date:     INR check location:     Preferred lab:     Send INR reminders to:  [facility]    Indications    Persistent Atrial Fibrillation [I48.91]           Comments:  home monitor talk to wife about dose Neela   [phone]            Anticoagulation Care Providers     Provider Role Specialty Phone number    Erica Hansen CNP  Cardiology [phone]    Everett Renee MD  Cardiology [phone]

## 2021-06-15 NOTE — PROGRESS NOTES
INR 2.2 at home. Continue current management dosing of Warfarin. Continue  diet of moderate Vitamin K intake. Discussed with pt the need to call with questions or concerns or any change in medication especially herbal medication or OTC. Call with increased bleeding or bruising or any upcoming procedures.

## 2021-06-15 NOTE — PROGRESS NOTES
[facility] Note    Assessment:  Atrial atrial fibrillation dating back to 1997 with multiple external cardioversions        Atrial fibrillation is very symptomatic with generalized weakness fatigue but not so much palpitations  Chronic amiodarone was partially effective;Continued for many years ; stopped because of photosensitive and other adverse effects   Pulmonary vein isolation done on 2 occasions 2011   Post PVI Cardoversion February 12/ 2015 ; CV 10-         Cardioversion  11-        Atrial fibrillation April 25, 2017  Current antiarrhythmic; sotalol 120 milligrams twice a day   Long-standing advanced dilated cardiomyopathy dating back to 1997-now resolved        Recent stress nuclear scan; April 30 2014 showed ejection fraction 47% without ischemia        Echocardiogram 21 September 2015 showed ejection fraction equals 50%        Transesophageal echocardiogram December 4, 2017; ejection fraction 50% sinus node dysfunction   MedtronicPacemaker implanted May 1999-dual atrial leads;         generator replacement May 2005          Removal of atrial and ventricular pacing leads with placement of ICD system 6 1 2014 and Medtronic single coil   Chronic anticoagulation with warfarin ; he had excessive skin bleeding from Eliquis   Obesity-substantial weight reduction on conscientious diet   COPD felt be related to previous  exposure   Negative obstructive sleep apnea evaluation   Hypertension  Hyperkalemia  Hyperlipidemia well controlled on statin; LDL equals 81  Coronary artery disease with stenting to proximal LAD September 20, 2017      Plan:  Device evaluation today shows very little atrial fibrillation  Battery voltage is good for another 5.6 years  Continue current medications I did not recommend any adjustments  Have device check through transtelephonic monitoring every 3 months  Continue warfarin; continue clopidogrel  Blood work December 18 showed normal kidney  function  Lipid panel has been good, LDLs have consistently been less than 90  Return in 1 year for comprehensive cardiac arrhythmia device assessment        Subjective:    I had the opportunity to see.Buck Sinclair , who is a 72 y.o. male with a known history of atrial fibrillation  Because of shortness of breath and exercise intolerance she underwent a stress test.  This showed some anterior ischemia and left ventricular enlargement with stress.  This led to coronary angiography September 20, 2017.  At that time he had a high-grade proximal LAD stenosis as well as disease of his diagonal branch.  The left circumflex and right coronary artery did not have severe disease.  He underwent successful drug-eluting stenting to this vessel-fairly complicated procedure    He was recommended to take Plavix for 12 months, stop aspirin after 1 month and continue on warfarin  He is interested in having left atrial appendage occlusion device.  He underwent transesophageal echocardiogram December 4, 2017.  This showed ejection fraction 50%.  However his left atrial appendage orifice seemed quite large he was not felt to be a candidate for the typical watchman device  Perhaps some newer devices will be available to be more suitable but at this time was not a candidate for the left atrial appendage occlusion procedure   He has had recurrent pneumonia and had a left lower lobe abscess that has resolved but recent chest CT scan did show some pneumonia on the left lower lung.  He has been treated with a course of antibiotics and steroids and is seen the pulmonary specialist    He has occasions of hemoptysis  He remains on warfarin his INR is been well regulated  He has had no chest pain  No particular shortness of breath orthopnea PND or pedal edema  No awareness of palpitations or awareness of atrial fibrillation    He also had injury to his left foot when he had a chainsaw accident.  Fortunately there was no damage to bone  tendon.  He is now walking on his leg after and during rehab      Problem List:  Patient Active Problem List   Diagnosis     Dyslipidemia, goal LDL below 70     Essential hypertension     Persistent Atrial Fibrillation     History of sick sinus syndrome     ICD (implantable cardioverter-defibrillator), dual, in situ     Status post catheter ablation of atrial fibrillation     Coronary artery disease due to calcified coronary lesion     COPD without exacerbation     Medical History:  Past Medical History:   Diagnosis Date     Anemia      BPH (benign prostatic hyperplasia)      COPD (chronic obstructive pulmonary disease)      Coronary artery disease due to calcified coronary lesion      Dyslipidemia, goal LDL below 70      Essential hypertension      History of cardiomyopathy      History of transfusion      Persistent atrial fibrillation      Pneumonia of left lower lobe due to infectious organism 10/4/2017     Skin cancer of trunk      Status post catheter ablation of atrial fibrillation 6/7/2017    PVI 4-2011 (Cryo/PVI + roof line + CTI line) Re-do PVI 7-2011 (RFA/PVI + CFE + VIDYA + confirmed CTI line)     Ventricular tachycardia      Surgical History:  Past Surgical History:   Procedure Laterality Date     CARDIAC DEFIBRILLATOR PLACEMENT       CARDIOVERSION      x20, last 2/12/15, 10/2015, 11/18/16, 6/16/17 by Lauren Foster CNP     COLONOSCOPY N/A 4/28/2017    Procedure: COLONOSCOPY with 2 ascending polyps and 1 transverse polyp;  Surgeon: Jose Whittington MD;  Location: [facility] GI;  Service:      CV CORONARY ANGIOGRAM N/A 9/20/2017    Procedure: Coronary Angiogram;  Surgeon: Sergio Cervantes MD;  Location: [facility] Lab;  Service:      EP ICD INSERT       FRACTURE SURGERY Left     wrist     INGUINAL HERNIA REPAIR Left 1967    while in the [employer] in [place] after 13 month in [place]     INSERT / REPLACE / REMOVE PACEMAKER       SD ABLATE HEART DYSRHYTHM FOCUS  04/2011    Catheter Ablation Atrial  Fibrillation PVI Apr 2011 (Cryo+RF-PVI + roof line + CTI line)     IN ABLATE HEART DYSRHYTHM FOCUS  07/2011    Re-do PVI Jul 2011 (RFA-PVI + CFE + VIDYA + confirmation of CTI line)     IN MYERS W/O FACETEC FORAMOT/DSKC 1/2 VRT SEG, CERVICAL      Laminectomy Lumbar;  Recorded: 03/09/2012;     TOTAL SHOULDER REPLACEMENT Right 03/03/2016    Dr. Abernathy of [facility]     Social History:  Social History     Social History     Marital status:      Spouse name: Neela     Number of children: N/A     Years of education: 12     Occupational History      Retired     [employer]      [employer]     Social History Main Topics     Smoking status: Former Smoker     Types: Cigarettes     Quit date: 1/1/1968     Smokeless tobacco: Never Used     Alcohol use 1.2 oz/week     2 Cans of beer per week      Comment: 1 beer per week     Drug use: No     Sexual activity: Yes     Partners: Female     Birth control/ protection: Post-menopausal     Other Topics Concern     Not on file     Social History Narrative       Review of Systems:              Review of Systems:       General: WNL  Eyes: WNL  Ears/Nose/Throat: WNL  Lungs: WNL  Heart: WNL  Stomach: WNL  Bladder: WNL  Muscle/Joints: WNL  Skin: WNL  Nervous System: WNL  Mental Health: WNL     Blood: WNL           Family History:         Family History   Problem Relation Age of Onset     Cancer Mother         leukemia     Cancer Father         bladder     Cancer Sister              Allergies:        Allergies   Allergen Reactions     Adhesive Other (See Comments)       ADHESIVE TAPE; SKIN IRRITATION     Amiodarone         ADVERSE REACTION.  Sunlight sensitivity.                                           Family History:  Family History   Problem Relation Age of Onset     Cancer Mother      leukemia     Cancer Father      bladder     Cancer Sister          Allergies:  Allergies   Allergen Reactions     Adhesive Other (See Comments)     ADHESIVE TAPE; SKIN  IRRITATION     Amiodarone      ADVERSE REACTION.  Sunlight sensitivity.     Lisinopril Cough       Medications:  Current Outpatient Prescriptions   Medication Sig Dispense Refill     ACETAMINOPHEN (TYLENOL ORAL) Take 1,000 mg by mouth 2 (two) times a day.        ascorbic acid (VITAMIN C) 1000 MG tablet Take 1,000 mg by mouth daily.       atorvastatin (LIPITOR) 40 MG tablet Take 40 mg by mouth daily.       budesonide-formoterol (SYMBICORT) 160-4.5 mcg/actuation inhaler Inhale 2 puffs 2 (two) times a day.       clopidogrel (PLAVIX) 75 mg tablet Take 1 tablet (75 mg total) by mouth daily. 30 tablet 11     co-enzyme Q-10 30 mg capsule Take 100 mg by mouth daily.       diltiazem (CARTIA XT) 240 MG 24 hr capsule Take 1 capsule (240 mg total) by mouth daily. 90 capsule 1     furosemide (LASIX) 20 MG tablet Take 20 mg by mouth daily.        losartan (COZAAR) 50 MG tablet Take 1 tablet (50 mg total) by mouth daily. 90 tablet 5     MAG 64 64 mg TbEC delayed-release tablet TAKE 1 TABLET BY MOUTH TWICE DAILY 180 tablet 1     multivitamin (MULTIVITAMIN) per tablet Take 1 tablet by mouth daily.       omega 3-dha-epa-fish oil (FISH OIL) 1,000 mg (120 mg-180 mg) cap Take 2 tablets by mouth 2 (two) times a day.        polyethylene glycol (MIRALAX) 17 gram packet Take 17 g by mouth daily.       predniSONE (DELTASONE) 10 mg tablet Take 4 tabs daily x 3 days, then 3 tabs daily x 3 days, then 2 tabs daily x 3 days, then 1 tab daily x 3 days. 30 tablet 0     sotalol (BETAPACE) 120 MG tablet TAKE 1 TABLET(120 MG) BY MOUTH TWICE DAILY 180 tablet 1     tiotropium (SPIRIVA) 18 mcg inhalation capsule Place 18 mcg into inhaler and inhale daily.       vardenafil (LEVITRA) 10 MG tablet Take 10 mg by mouth daily as needed for erectile dysfunction.       VITAMIN D2 50,000 unit capsule Take 50,000 Units by mouth 2 (two) times a week. SUN and WED  3     warfarin (COUMADIN) 2.5 MG tablet Take 2.5 mg by mouth See Admin Instructions.       No current  "facility-administered medications for this visit.          Surgical site  The ICD is well-healed right subclavicular region    Device interrogation  Intrinsic rhythm is sinus rhythm in the 60s  95% atrial pacing almost no ventricular pacing  Very little atrial fibrillation with atrial fibrillation less than 0.1%-longest episode atrial fibrillation was 31 minutes with controlled ventricular response.  Other episodes were less than 1 minute  No important ventricular tachycardia  Lead function satisfactory  Battery voltage good for another 5.6 years    Objective:   Vital signs:  /90 (Patient Site: Right Arm, Patient Position: Sitting, Cuff Size: Adult Large)  Pulse 60  Resp 20  Ht 6' 3\" (1.905 m)  Wt (!) 280 lb (127 kg)  BMI 35 kg/m2      Physical Exam:    GENERAL APPEARANCE: Alert, cooperative and in no acute distress.  HEENT: No scleral icterus. No Xanthelasma. Oral mucous membranes pink and moist.  NECK: JVP  Normal cm. No Hepatojugular reflux. Thyroid not  Palpable  CHEST: clear to auscultation and percussion  CARDIOVASCULAR: S1, S2 without murmur    Brachial, radial  pulses are intact and symmetric.   No carotid bruits noted.  ABDOMEN: Non tender. BS+. No bruits.  EXTREMITIES: No cyanosis, clubbing or edema.    Lab Results:  LIPIDS:  Lab Results   Component Value Date    CHOL 143 09/14/2017    CHOL 156 12/13/2016    CHOL 124 05/06/2016     Lab Results   Component Value Date    HDL 51 09/14/2017    HDL 60 12/13/2016    HDL 47 05/06/2016     Lab Results   Component Value Date    LDLCALC 78 09/14/2017    LDLCALC 81 12/13/2016    LDLCALC 62 05/06/2016     Lab Results   Component Value Date    TRIG 69 09/14/2017    TRIG 73 12/13/2016    TRIG 73 05/06/2016     No components found for: CHOLHDL    BMP:  Lab Results   Component Value Date    CREATININE 1.07 12/18/2017    BUN 26 12/18/2017     12/18/2017    K 4.6 12/18/2017     (H) 12/18/2017    CO2 24 12/18/2017         This note has been dictated " using voice recognition software. Any grammatical or context distortions are unintentional and inherent to the software.  Everett Renee MD  [facility]  [phone]

## 2021-06-15 NOTE — PROGRESS NOTES
Buck Sinclair has participated in 8 sessions of Phase II Cardiac Rehab.    Progress Report:   Cardiac Rehab Treatment Progress Report 12/5/2017 12/7/2017 12/12/2017 12/14/2017 12/26/2017   Weight 274 lbs - - - -   Pre Exercise  HR 81 81 86 90 77   Pre Exercise /76 112/66 118/64 114/66 122/66   Pre Blood Sugar (mg/dl) - - - - -   Treadmill Peak HR 95 98 95 90 102   Nustep Peak Heart Rate - - - - -   Nustep Peak Blood Pressure - - - - -   Heart Rate 84 76 83 89 73   Post Exercise /70 102/60 118/60 112/60 -   Post Blood Sugar (mg/dl) - - - - -   ECG SR/Paced/  Occ.  PVC's SR/ Paced;  Occ. PVC's SR/Paced SR/Paced SR/Paced   Total Exercise Minutes 40 40 40 40 40         Current Status:  Currently exercising without complaints or symptoms.    If Physician recommends change in treatment plan, please place orders.        __________________________________________________      _____________  Signature                                                                                                  Date

## 2021-06-15 NOTE — PROGRESS NOTES
Pulmonary Clinic Follow-up Visit    Assessment/Plan:  72 year old male with a history of tobacco dependence in remission, CAD s/p PCI/stents, afib s/p PVI with ICD/PPM on anticoagulation, presented 10/4 with hemoptysis, found to have cavitary lesion and extensive pneumonia in LLL, subsequent recurrent LLL pneumonia, now presenting for semiurgent follow-up for recurrent hemoptysis.    Hemoptysis and dyspnea: Transient one week ago, now resolved.  Completed six weeks of amoxicillin-clavulanate for likely LLL lung abscess, and that lesion has resolved.  Had a new area of ground glass in the LLL on 12/18 likely due to pneumonia, which is likely the cause of his recent hemoptysis in the setting of ongoing anticoagulation.  He may have COPD but has not yet had PFTs.  Would not pursue any additional interventions for the transient hemoptysis in the setting of pneumonia and anticoagulation.  In case of COPD exacerbation, will complete prednisone taper.    Plan:  - prednisone 12-day taper  - continue budesonide-formoterol 160-4.5 mcg two inhalations BID; rinse/gargle/spit water after use  - continue tiotropium HandiHaler one inhalation daily  - obtain complete PFTs  - pulmonary follow-up in six weeks with Dr. Payton  - encouraged him to call any time with questions or concerning symptoms    CCx: hemoptysis, lung abscess, possible COPD    HPI: 72 year old male with a history of tobacco dependence in remission, CAD s/p PCI/stents, PAF on anticoagulation, presented 10/4 with hemoptysis, found to have cavitary lesion and extensive pneumonia in LLL, subsequent recurrent LLL pneumonia, now presenting for semiurgent follow-up for recurrent hemoptysis. Has been treated for six weeks with amoxicillin-clavulanate for LLL small cavitary lesion, likely lung abscess, which has now resolved.  On December 18 had transient small-volume hemoptysis and CT showed a small recurrent infiltrate in the LLL, most c/w pneumonia.  Completed additional  7 days of amoxicillin-clavulanate and now back to baseline with resolved cough, but currently as viral URI symptoms, and a bit more short of breath with the cold weather.    ROS:  A 12-system review was obtained and was negative with the exception of the symptoms endorsed in the history of present illness.    PMH:  Past Medical History:   Diagnosis Date     Anemia      BPH (benign prostatic hyperplasia)      COPD (chronic obstructive pulmonary disease)      Coronary artery disease due to calcified coronary lesion      Dyslipidemia, goal LDL below 70      Essential hypertension      History of cardiomyopathy      History of transfusion      Persistent atrial fibrillation      Pneumonia of left lower lobe due to infectious organism 10/4/2017     Skin cancer of trunk      Status post catheter ablation of atrial fibrillation 6/7/2017    PVI 4-2011 (Cryo/PVI + roof line + CTI line) Re-do PVI 7-2011 (RFA/PVI + CFE + VIDYA + confirmed CTI line)     Ventricular tachycardia        PSH:  Past Surgical History:   Procedure Laterality Date     CARDIAC DEFIBRILLATOR PLACEMENT       CARDIOVERSION      x20, last 2/12/15, 10/2015, 11/18/16, 6/16/17 by Lauren Foster CNP     COLONOSCOPY N/A 4/28/2017    Procedure: COLONOSCOPY with 2 ascending polyps and 1 transverse polyp;  Surgeon: Jose Whittington MD;  Location: [facility] GI;  Service:      CV CORONARY ANGIOGRAM N/A 9/20/2017    Procedure: Coronary Angiogram;  Surgeon: Sergio Cervantes MD;  Location: [facility] Cath Lab;  Service:      EP ICD INSERT       FRACTURE SURGERY Left     wrist     INGUINAL HERNIA REPAIR Left 1967    while in the [employer] in [place] after 13 month in [place]     INSERT / REPLACE / REMOVE PACEMAKER       NH ABLATE HEART DYSRHYTHM FOCUS  04/2011    Catheter Ablation Atrial Fibrillation PVI Apr 2011 (Cryo+RF-PVI + roof line + CTI line)     NH ABLATE HEART DYSRHYTHM FOCUS  07/2011    Re-do PVI Jul 2011 (RFA-PVI + CFE + VIDYA + confirmation of CTI line)     NH MYERS  W/O FACETEC FORAMOT/DSKC 1/2 VRT SEG, CERVICAL      Laminectomy Lumbar;  Recorded: 03/09/2012;     TOTAL SHOULDER REPLACEMENT Right 03/03/2016    Dr. Abernathy of [facility]       Allergies:  Allergies   Allergen Reactions     Adhesive Other (See Comments)     ADHESIVE TAPE; SKIN IRRITATION     Amiodarone      ADVERSE REACTION.  Sunlight sensitivity.     Lisinopril Cough       Family HX:  Family History   Problem Relation Age of Onset     Cancer Mother      leukemia     Cancer Father      bladder     Cancer Sister        Social Hx:  Social History     Social History     Marital status:      Spouse name: Neela     Number of children: N/A     Years of education: 12     Occupational History      Retired     [employer]     Social History Main Topics     Smoking status: Former Smoker     Types: Cigarettes     Quit date: 1/1/1968     Smokeless tobacco: Never Used     Alcohol use 1.2 oz/week     2 Cans of beer per week      Comment: 1 beer per week     Drug use: No     Sexual activity: Yes     Partners: Female     Birth control/ protection: Post-menopausal     Other Topics Concern     Not on file     Social History Narrative       Current Meds:  Current Outpatient Prescriptions   Medication Sig Dispense Refill     ACETAMINOPHEN (TYLENOL ORAL) Take 1,000 mg by mouth 2 (two) times a day.        ascorbic acid (VITAMIN C) 1000 MG tablet Take 1,000 mg by mouth daily.       atorvastatin (LIPITOR) 40 MG tablet Take 40 mg by mouth daily.       budesonide-formoterol (SYMBICORT) 160-4.5 mcg/actuation inhaler Inhale 2 puffs 2 (two) times a day.       clopidogrel (PLAVIX) 75 mg tablet Take 1 tablet (75 mg total) by mouth daily. 30 tablet 11     co-enzyme Q-10 30 mg capsule Take 100 mg by mouth daily.       diltiazem (CARTIA XT) 240 MG 24 hr capsule Take 1 capsule (240 mg total) by mouth daily. 90 capsule 1     furosemide (LASIX) 20 MG tablet Take 20 mg by mouth daily.        losartan  (COZAAR) 50 MG tablet Take 1 tablet (50 mg total) by mouth daily. 90 tablet 5     MAG 64 64 mg TbEC delayed-release tablet TAKE 1 TABLET BY MOUTH TWICE DAILY 180 tablet 1     multivitamin (MULTIVITAMIN) per tablet Take 1 tablet by mouth daily.       omega 3-dha-epa-fish oil (FISH OIL) 1,000 mg (120 mg-180 mg) cap Take 2 tablets by mouth 2 (two) times a day.        polyethylene glycol (MIRALAX) 17 gram packet Take 17 g by mouth daily.       sotalol (BETAPACE) 120 MG tablet TAKE 1 TABLET(120 MG) BY MOUTH TWICE DAILY 180 tablet 1     tiotropium (SPIRIVA) 18 mcg inhalation capsule Place 18 mcg into inhaler and inhale daily.       vardenafil (LEVITRA) 10 MG tablet Take 10 mg by mouth daily as needed for erectile dysfunction.       VITAMIN D2 50,000 unit capsule Take 50,000 Units by mouth 2 (two) times a week. SUN and WED  3     warfarin (COUMADIN) 2.5 MG tablet Take 2.5 mg by mouth See Admin Instructions.       predniSONE (DELTASONE) 10 mg tablet Take 4 tabs daily x 3 days, then 3 tabs daily x 3 days, then 2 tabs daily x 3 days, then 1 tab daily x 3 days. 30 tablet 0     No current facility-administered medications for this visit.        Physical Exam:  /66  Pulse 85  Resp 16  Wt (!) 282 lb (127.9 kg)  SpO2 95% Comment: RA at rest  BMI 35.25 kg/m2  Gen: alert, oriented, no distress  HEENT: nasal turbinates are unremarkable, no oropharyngeal lesions, no cervical or supraclavicular lymphadenopathy  CV: RRR, no M/G/R  Resp: CTAB, no focal crackles or wheezes  Abd: soft, nontender, no palpable organomegaly  Skin: no apparent rashes  Ext: no cyanosis, clubbing or edema  Neuro: alert, nonfocal    Labs:  reviewed    Imaging studies:  Chest CTA (12/18/17):  - images directly reviewed, formal interpretation follows:  FINDINGS:  ANGIOGRAM CHEST: Negative for pulmonary emboli. Negative for thoracic aortic dissection and aneurysms.     LUNGS AND PLEURA: Previously seen left lung cavitary nodule has resolved. There is some  residual fibrosis in the inferior left lower lobe. New groundglass infiltrate in the superior segment of the left lower lobe. Minimal fibrosis in the posterior right   upper lobe is similar to previous. There is right lower lobe fibrosis adjacent to spinal osteophytes.     MEDIASTINUM: No lymphadenopathy. Cardiac pacemaker. Marked coronary artery calcification.     LIMITED UPPER ABDOMEN: Negative.     MUSCULOSKELETAL: Negative.     IMPRESSION:   CONCLUSION:  1.  No evidence of pulmonary embolus.  2.  New mild pneumonia superior segment of the left lower lobe.  3.  Previously seen cavitary nodule in the left lower lobe has resolved.  4.  Severe coronary artery calcification.    Sagar Farrell MD  Pulmonary and Critical Care Medicine  [facility]  Cell [phone]  Office [phone]  Pager [phone]

## 2021-06-15 NOTE — TELEPHONE ENCOUNTER
Received a faxed INR result for Buck Sinclair  From [facility]  INR result dated 3/2/2021 is 2.5

## 2021-06-15 NOTE — TELEPHONE ENCOUNTER
Who is calling:  Spouse Neela   Reason for Call:  Returning call   Date of last appointment with primary care:   Okay to leave a detailed message: No

## 2021-06-16 PROBLEM — Z98.890 STATUS POST CATHETER ABLATION OF ATRIAL FIBRILLATION: Chronic | Status: ACTIVE | Noted: 2017-06-07

## 2021-06-16 PROBLEM — R04.2 HEMOPTYSIS: Status: ACTIVE | Noted: 2017-10-04

## 2021-06-16 PROBLEM — E66.9 OBESITY: Status: ACTIVE | Noted: 2021-05-05

## 2021-06-16 PROBLEM — J45.909 ASTHMA WITHOUT STATUS ASTHMATICUS: Status: ACTIVE | Noted: 2021-05-05

## 2021-06-16 PROBLEM — I42.1 HYPERTROPHIC OBSTRUCTIVE CARDIOMYOPATHY (H): Status: ACTIVE | Noted: 2020-02-03

## 2021-06-16 PROBLEM — G58.9 MONONEURITIS: Status: ACTIVE | Noted: 2021-05-05

## 2021-06-16 PROBLEM — R06.09 DOE (DYSPNEA ON EXERTION): Status: ACTIVE | Noted: 2017-09-15

## 2021-06-16 NOTE — TELEPHONE ENCOUNTER
ANTICOAGULATION  MANAGEMENT-Patient Home Monitoring Result    Assessment     Therapeutic INR result of 2.2 (goal INR of 2.0-3.0) received via fax from [employer] home INR monitoring company.        Previous INR was Therapeutic    Buck Sinclair last contacted by phone: 3/16/2021    Plan     Per home monitoring agreement with patient, patient was NOT contacted regarding therapeutic result today.  Patient is to continue current dose and continue to check INR with home monitor per protocol.  ?   Gisele Prince RN    Subjective/Objective:      Buck Sinclair, a 76 y.o. male  is established on warfarin using a home INR monitor.    Anticoagulation Episode Summary     Current INR goal:  2.0-3.0   TTR:  93.5 % (1 y)   Next INR check:  4/6/2021   INR from last check:  2.20 (3/30/2021)   Weekly max warfarin dose:     Target end date:     INR check location:     Preferred lab:     Send INR reminders to:  [facility] HEART CARE    Indications    Persistent Atrial Fibrillation [I48.91]           Comments:  home monitor talk to wife about dose [facility]   [phone]            Anticoagulation Care Providers     Provider Role Specialty Phone number    Erica Hansen CNP  Cardiology [phone]    Everett Renee MD  Cardiology [phone]

## 2021-06-16 NOTE — TELEPHONE ENCOUNTER
ANTICOAGULATION  MANAGEMENT-Patient Home Monitoring Result    Assessment     Therapeutic INR result of 2.1 (goal INR of 2.0-3.0) received via fax from [employer] home INR monitoring company.        Previous INR was Therapeutic    Bcuk Sinclair last contacted by phone: 3/16/2021    Plan     Per home monitoring agreement with patient, patient was NOT contacted regarding therapeutic result today.  Patient is to continue current dose and continue to check INR with home monitor per protocol.  ?   Gisele Prince RN    Subjective/Objective:      Buck Sinclair, a 76 y.o. male  is established on warfarin using a home INR monitor.    Anticoagulation Episode Summary     Current INR goal:  2.0-3.0   TTR:  93.5 % (1 y)   Next INR check:  4/20/2021   INR from last check:  2.10 (4/13/2021)   Weekly max warfarin dose:     Target end date:     INR check location:     Preferred lab:     Send INR reminders to:  [facility] HEART CARE    Indications    Persistent Atrial Fibrillation [I48.91]           Comments:  home monitor talk to wife about dose [facility]   [phone]            Anticoagulation Care Providers     Provider Role Specialty Phone number    Erica Hansen CNP  Cardiology [phone]    Everett Renee MD  Cardiology [phone]

## 2021-06-16 NOTE — TELEPHONE ENCOUNTER
ANTICOAGULATION  MANAGEMENT-Patient Home Monitoring Result    Assessment     Therapeutic INR result of 2.4 (goal INR of 2.0-3.0) received via fax from [employer] home INR monitoring company.        Previous INR was Therapeutic    Buck Sinclair last contacted by phone: 3/16/2021    Plan     Per home monitoring agreement with patient, patient was NOT contacted regarding therapeutic result today.  Patient is to continue current dose and continue to check INR with home monitor per protocol.  ?   Gisele Prince RN    Subjective/Objective:      Buck Sinclair, a 76 y.o. male  is established on warfarin using a home INR monitor.    Anticoagulation Episode Summary     Current INR goal:  2.0-3.0   TTR:  93.5 % (1 y)   Next INR check:  3/30/2021   INR from last check:  2.40 (3/23/2021)   Weekly max warfarin dose:     Target end date:     INR check location:     Preferred lab:     Send INR reminders to:  [facility] HEART CARE    Indications    Persistent Atrial Fibrillation [I48.91]           Comments:  home monitor talk to wife about dose [facility]   [phone]            Anticoagulation Care Providers     Provider Role Specialty Phone number    Erica Hansen CNP  Cardiology [phone]    Everett Renee MD  Cardiology [phone]

## 2021-06-16 NOTE — PROGRESS NOTES
Pulmonary Clinic Follow-up Visit    Assessment/Plan:  72 year old male with a history of tobacco dependence in remission, CAD s/p PCI/stents, afib s/p PVI with ICD/PPM on anticoagulation, presented 10/4 with hemoptysis, found to have cavitary lesion and extensive pneumonia in LLL, subsequent recurrent LLL pneumonia, now presenting for follow up of above and his COPD. Overall he appears stable, but still fairly symptomatic despite ICS/LABA high dose and LAMA. He is not using a FAVIAN so we will prescribe that for him. His COPD appears to be GOLD stage C or D (recent hospitalization, more symptoms) at this point and we need to optimize his management.    Plan:  - continue budesonide-formoterol 160-4.5 mcg two inhalations BID WITH SPACER; rinse/gargle/spit water after use. Spacer technique demonstrated in clinic today  - continue tiotropium HandiHaler one inhalation daily  - Rx for albuterol to be used as needed  - referral for pulmonary rehab, discussed benefits including improved symptom management and better quality of life, decreased risk of hospitalization.  - encouraged exercise, weight loss as able  - UTD with flu, pneumonia vaccinations.    Follow up in 6 weeks for reassessment.    CCx: follow up of hemoptysis, cavitary pneumonia and COPD    HPI: Interim history: Buck was last seen by Dr. Farrell on 12/27/2017. Since that time his pneumonia has completely resolved. He recently completed a prednisone taper for COPD. He had PFts showing moderate obstruction. A repeat CT in December showed new LLL pneumonia.   He finished cardiopulmonary rehab last week.  He does get short of breath with most activities. Worse in the winter. He coughs somewhat regularly.   No hemoptysis, weight loss, fevers or chills.  Does not have rescue inhaler at home.    ROS:  A 12-system review was obtained and was negative with the exception of the symptoms endorsed in the history of present illness.    PMH:  Past Medical History:    Diagnosis Date     Anemia      BPH (benign prostatic hyperplasia)      COPD (chronic obstructive pulmonary disease)      Coronary artery disease due to calcified coronary lesion      Dyslipidemia, goal LDL below 70      Essential hypertension      History of cardiomyopathy      History of transfusion      Persistent atrial fibrillation      Pneumonia of left lower lobe due to infectious organism 10/4/2017     Skin cancer of trunk      Status post catheter ablation of atrial fibrillation 6/7/2017    PVI 4-2011 (Cryo/PVI + roof line + CTI line) Re-do PVI 7-2011 (RFA/PVI + CFE + VIDYA + confirmed CTI line)     Ventricular tachycardia        PSH:  Past Surgical History:   Procedure Laterality Date     CARDIAC DEFIBRILLATOR PLACEMENT       CARDIOVERSION      x20, last 2/12/15, 10/2015, 11/18/16, 6/16/17 by Lauren Foster CNP     COLONOSCOPY N/A 4/28/2017    Procedure: COLONOSCOPY with 2 ascending polyps and 1 transverse polyp;  Surgeon: Jose Whittington MD;  Location: [facility] GI;  Service:      CV CORONARY ANGIOGRAM N/A 9/20/2017    Procedure: Coronary Angiogram;  Surgeon: Sergio Cervantes MD;  Location: [facility] Cath Lab;  Service:      EP ICD INSERT       FRACTURE SURGERY Left     wrist     INGUINAL HERNIA REPAIR Left 1967    while in the [employer] in [place] after 13 month in [place]     INSERT / REPLACE / REMOVE PACEMAKER       DE ABLATE HEART DYSRHYTHM FOCUS  04/2011    Catheter Ablation Atrial Fibrillation PVI Apr 2011 (Cryo+RF-PVI + roof line + CTI line)     DE ABLATE HEART DYSRHYTHM FOCUS  07/2011    Re-do PVI Jul 2011 (RFA-PVI + CFE + VIDYA + confirmation of CTI line)     DE MYERS W/O FACETEC FORAMOT/DSKC 1/2 VRT SEG, CERVICAL      Laminectomy Lumbar;  Recorded: 03/09/2012;     TOTAL SHOULDER REPLACEMENT Right 03/03/2016    Dr. Abernathy of [facility]       Allergies:  Allergies   Allergen Reactions     Adhesive Other (See Comments)     ADHESIVE TAPE; SKIN IRRITATION     Amiodarone      ADVERSE  REACTION.  Sunlight sensitivity.     Lisinopril Cough       Family HX:  Family History   Problem Relation Age of Onset     Cancer Mother      leukemia     Cancer Father      bladder     Cancer Sister        Social Hx:  Social History     Social History     Marital status:      Spouse name: Neela     Number of children: N/A     Years of education: 12     Occupational History      Retired     [employer]      [employer]     Social History Main Topics     Smoking status: Former Smoker     Packs/day: 0.50     Years: 4.00     Types: Cigarettes     Quit date: 1/1/1968     Smokeless tobacco: Never Used     Alcohol use 1.2 oz/week     2 Cans of beer per week      Comment: 1 beer per week     Drug use: No     Sexual activity: Yes     Partners: Female     Birth control/ protection: Post-menopausal     Other Topics Concern     Not on file     Social History Narrative       Current Meds:  Current Outpatient Prescriptions   Medication Sig Dispense Refill     ACETAMINOPHEN (TYLENOL ORAL) Take 1,000 mg by mouth 2 (two) times a day.        ascorbic acid (VITAMIN C) 1000 MG tablet Take 1,000 mg by mouth daily.       atorvastatin (LIPITOR) 40 MG tablet Take 40 mg by mouth daily.       budesonide-formoterol (SYMBICORT) 160-4.5 mcg/actuation inhaler Inhale 2 puffs 2 (two) times a day.       clopidogrel (PLAVIX) 75 mg tablet Take 1 tablet (75 mg total) by mouth daily. 30 tablet 11     co-enzyme Q-10 30 mg capsule Take 100 mg by mouth daily.       diltiazem (CARTIA XT) 240 MG 24 hr capsule Take 1 capsule (240 mg total) by mouth daily. 90 capsule 1     furosemide (LASIX) 20 MG tablet Take 20 mg by mouth daily.        furosemide (LASIX) 20 MG tablet TAKE 1 TABLET(20 MG) BY MOUTH DAILY 90 tablet 1     losartan (COZAAR) 50 MG tablet Take 1 tablet (50 mg total) by mouth daily. 90 tablet 5     MAG 64 64 mg TbEC delayed-release tablet TAKE 1 TABLET BY MOUTH TWICE DAILY 180 tablet 1     multivitamin (MULTIVITAMIN) per tablet  Take 1 tablet by mouth daily.       omega 3-dha-epa-fish oil (FISH OIL) 1,000 mg (120 mg-180 mg) cap Take 2 tablets by mouth 2 (two) times a day.        polyethylene glycol (MIRALAX) 17 gram packet Take 17 g by mouth daily.       predniSONE (DELTASONE) 10 mg tablet Take 4 tabs daily x 3 days, then 3 tabs daily x 3 days, then 2 tabs daily x 3 days, then 1 tab daily x 3 days. 30 tablet 0     sotalol (BETAPACE) 120 MG tablet TAKE 1 TABLET(120 MG) BY MOUTH TWICE DAILY 180 tablet 1     tiotropium (SPIRIVA) 18 mcg inhalation capsule Place 18 mcg into inhaler and inhale daily.       vardenafil (LEVITRA) 10 MG tablet Take 10 mg by mouth daily as needed for erectile dysfunction.       VITAMIN D2 50,000 unit capsule Take 50,000 Units by mouth 2 (two) times a week. SUN and WED  3     warfarin (COUMADIN) 2.5 MG tablet Take 2.5 mg by mouth See Admin Instructions.       No current facility-administered medications for this visit.        Physical Exam:  There were no vitals taken for this visit.  Gen: alert, oriented, no distress  HEENT: nasal turbinates are unremarkable, no oropharyngeal lesions, no cervical or supraclavicular lymphadenopathy  CV: RRR, no M/G/R  Resp: CTAB, no focal crackles or wheezes  Abd: soft, nontender, no palpable organomegaly  Skin: no apparent rashes  Ext: no cyanosis, clubbing or edema  Neuro: alert, nonfocal    Labs:  reviewed    Imaging studies:  Chest CTA (12/18/17):  - images directly reviewed, formal interpretation follows:  FINDINGS:  ANGIOGRAM CHEST: Negative for pulmonary emboli. Negative for thoracic aortic dissection and aneurysms.     LUNGS AND PLEURA: Previously seen left lung cavitary nodule has resolved. There is some residual fibrosis in the inferior left lower lobe. New groundglass infiltrate in the superior segment of the left lower lobe. Minimal fibrosis in the posterior right   upper lobe is similar to previous. There is right lower lobe fibrosis adjacent to spinal  osteophytes.     MEDIASTINUM: No lymphadenopathy. Cardiac pacemaker. Marked coronary artery calcification.     LIMITED UPPER ABDOMEN: Negative.     MUSCULOSKELETAL: Negative.     IMPRESSION:   CONCLUSION:  1.  No evidence of pulmonary embolus.  2.  New mild pneumonia superior segment of the left lower lobe.  3.  Previously seen cavitary nodule in the left lower lobe has resolved.  4.  Severe coronary artery calcification.    PFTs   FEV1/FVC is 0.56 and is reduced.  FEV1 is 73% predicted and is reduced.  FVC is 96% predicted and normal.  There was no improvement in spirometry after a single inhaled dose of bronchodilator.  TLC is 99% predicted and is normal.  RV is 113% predicted and is normal.  DLCO is 93% predicted and is normal when it   is corrected for hemoglobin.     Impression:  Full Pulmonary Function Test is abnormal.  PFTs are consistent with mild obstructive disease.  Spirometry is not consistent with reversibility.  There is no hyperinflation.  There is no air-trapping.  Diffusion capacity when corrected for hemoglobin is normal.  Declinesin both FEV1 and TLC since 2009 with overall preservation of diffusing capacity.    Hugh (Woodrow Payton MD  [facility] Pulmonary & Critical Care  Pager [phone]  Clinic [phone]

## 2021-06-16 NOTE — TELEPHONE ENCOUNTER
Received a faxed INR result for Buck Sinclair  From [facility]  INR result dated 3/23/2021 is 2.4

## 2021-06-16 NOTE — PROGRESS NOTES
ANTICOAGULATION  MANAGEMENT-Patient Home Monitoring Result    Assessment     Therapeutic INR result of 2.2 (goal INR of 2.0-3.0) received via fax from [employer] home INR monitoring company.        Previous INR was Therapeutic    Buck Sinclair last contacted by phone: 3/16/21    Plan     Per home monitoring agreement with patient, patient was NOT contacted regarding therapeutic result today.  Patient is to continue current dose and continue to check INR with home monitor per protocol.  ?   Fatoumata Ochoa RN    Subjective/Objective:      Buck Sinclair, a 76 y.o. male  is established on warfarin using a home INR monitor.    Anticoagulation Episode Summary     Current INR goal:  2.0-3.0   TTR:  93.5 % (1 y)   Next INR check:  4/13/2021   INR from last check:  2.20 (4/6/2021)   Weekly max warfarin dose:     Target end date:     INR check location:     Preferred lab:     Send INR reminders to:  [facility] HEART [facility]    Indications    Persistent Atrial Fibrillation [I48.91]           Comments:  home monitor talk to wife about dose [facility]   [phone]            Anticoagulation Care Providers     Provider Role Specialty Phone number    Erica Hansen CNP  Cardiology [phone]    Everett Renee MD  Cardiology [phone]

## 2021-06-16 NOTE — TELEPHONE ENCOUNTER
Received a faxed INR result for Buck Sinclair  From [facility]  INR result dated 4/20/2021 is 2.1

## 2021-06-16 NOTE — TELEPHONE ENCOUNTER
Telephone Encounter by Jaclyn Zaman RN at 9/26/2019  1:23 PM     Author: Jaclyn Zaman RN Service: -- Author Type: Registered Nurse    Filed: 9/26/2019  1:23 PM Encounter Date: 8/26/2019 Status: Signed    : Jaclyn Zaman RN (Registered Nurse)       Everett Cerda MD; [facility] Device  Pool 1 month ago      Dr. Renee,     Device beeping because in AF but not with good monitor connectivity. Feeling ok, freq AF in past with numerous ECV[place] area until after [place] Day.     Schedulers: Please place tickler/reminder to call patient after Labor day to have him come in to see Device RN to reset device tones. [phone] was the # he called from.   Also, was due to see Dr. Renee for 6 months follow up next month, please arrange that visit as well next week when you call him.  thank you!

## 2021-06-16 NOTE — TELEPHONE ENCOUNTER
ANTICOAGULATION  MANAGEMENT-Patient Home Monitoring Result    Assessment     Therapeutic INR result of 2.1 (goal INR of 2.0-3.0) received via fax from [employer] home INR monitoring company.        Previous INR was Therapeutic    Buck Sinclair last contacted by phone: 3/16/21    Plan     Per home monitoring agreement with patient, patient was NOT contacted regarding therapeutic result today.  Patient is to continue current dose and continue to check INR with home monitor per protocol.  ?   Marina Velazco RN    Subjective/Objective:      Buck Sinclair, a 76 y.o. male  is established on warfarin using a home INR monitor.    Anticoagulation Episode Summary     Current INR goal:  2.0-3.0   TTR:  93.5 % (1 y)   Next INR check:  4/20/2021   INR from last check:  2.10 (4/20/2021)   Weekly max warfarin dose:     Target end date:     INR check location:     Preferred lab:     Send INR reminders to:  [facility]    Indications    Persistent Atrial Fibrillation [I48.91]           Comments:  home monitor talk to wife about dose [facility]   [phone]            Anticoagulation Care Providers     Provider Role Specialty Phone number    Erica Hansen CNP  Cardiology [phone]    Everett Renee MD  Cardiology [phone]

## 2021-06-16 NOTE — PROGRESS NOTES
INR 2.1 INR stable. Discussed continuing management of dose of Warfarin and returning in 2 weeks . No changes to diet needed at this time. Continue moderate intake of Vitamin K and call if increase bruising or unexplained bleeding. Call with medication changes or upcoming procedures.

## 2021-06-16 NOTE — TELEPHONE ENCOUNTER
Telephone Encounter by Gisele Prince RN at 2/4/2020  3:11 PM     Author: Gisele Prince RN Service: -- Author Type: Registered Nurse    Filed: 2/4/2020  3:17 PM Encounter Date: 2/4/2020 Status: Attested    : Gisele Prince RN (Registered Nurse) Cosigner: Erica Hansen CNP at 2/4/2020  4:19 PM    Attestation signed by Erica Hansen CNP at 2/4/2020  4:19 PM    agree                ANTICOAGULATION  MANAGEMENT    Assessment     Today's INR result of 3.1 is Supratherapeutic (goal INR of 2.0-3.0)        Warfarin taken as previously instructed    No new diet changes affecting INR    No new medication/supplements affecting INR    Continues to tolerate warfarin with no reported s/s of bleeding or thromboembolism     Previous INR was Therapeutic    Plan:     Spoke with patient 's spouse Neela regarding INR result and instructed:     Warfarin Dosing Instructions:  Change warfarin dose to    5 mg every Mon, Fri; 2.5 mg all other days      (10 % change)    Instructed patient to follow up no later than: one week with home monitor    Education provided: importance of therapeutic range and target INR goal and significance of current INR result    Neela verbalizes understanding and agrees to warfarin dosing plan.    Instructed to call the [facility] for any changes, questions or concerns. (#[phone])   ?   Gisele Prince RN    Subjective/Objective:      Buck GOLDMAN Yahir, a 74 y.o. male is on warfarin.     Buck reports:     Home warfarin dose: verbally confirmed home dose with Neela and updated on anticoagulation calendar     Missed doses: No     Medication changes:  No     S/S of bleeding or thromboembolism:  No     New Injury or illness:  No     Changes in diet or alcohol consumption:  No     Upcoming surgery, procedure or cardioversion:  No    Anticoagulation Episode Summary     Current INR goal:   2.0-3.0   TTR:   75.6 % (1 y)   Next INR check:   2/11/2020   INR from last check:    3.10! (2/4/2020)   Weekly max warfarin dose:      Target end date:      INR check location:      Preferred lab:      Send INR reminders to:   [facility]    Indications    Persistent Atrial Fibrillation [I48.91]           Comments:   home monitor talk to wife about dose Neela   [phone]            Anticoagulation Care Providers     Provider Role Specialty Phone number    Erica Hansen CNP  Cardiology [phone]    Everett Renee MD  Cardiology [phone]

## 2021-06-16 NOTE — PROGRESS NOTES
INR 3.1 Continue current management dosing of Warfarin. Continue  diet of moderate Vitamin K intake. Discussed with pt the need to call with questions or concerns or any change in medication especially herbal medication or OTC. Call with increased bleeding or bruising or any upcoming procedures.

## 2021-06-16 NOTE — TELEPHONE ENCOUNTER
Telephone Encounter by Millie Arias at 4/23/2019  8:56 AM     Author: Millie Arias Service: -- Author Type: --    Filed: 4/23/2019  8:57 AM Encounter Date: 4/19/2019 Status: Signed    : Millie Arias       Per insurance, medication is on formulary and does not require a PA. Called pharmacy and they were able to get a paid claim.

## 2021-06-16 NOTE — TELEPHONE ENCOUNTER
ANTICOAGULATION  MANAGEMENT    Assessment     Today's INR result of 2.5 is Therapeutic (goal INR of 2.0-3.0)        Warfarin taken as previously instructed    No new diet changes affecting INR    No new medication/supplements affecting INR    Continues to tolerate warfarin with no reported s/s of bleeding or thromboembolism     Previous INR was Subtherapeutic    Plan:     Spoke on phone with patient's spouse Neela regarding INR result and instructed:      Warfarin Dosing Instructions:  Continue current warfarin dose    5 mg every Mon; 2.5 mg all other days        (0 % change)    Instructed patient to follow up no later than: one week with home monitor.    Education provided: importance of therapeutic range and target INR goal and significance of current INR result    Neela verbalizes understanding and agrees to warfarin dosing plan.    Instructed to call the [facility] for any changes, questions or concerns. (#[phone])   ?   Gisele Prince RN    Subjective/Objective:      Buck Sinclair, a 76 y.o. male is on warfarin. Buck Jane reports:     Home warfarin dose: verbally confirmed home dose with Neela and updated on anticoagulation calendar     Missed doses: No     Medication changes:  No     S/S of bleeding or thromboembolism:  No     New Injury or illness:  No     Changes in diet or alcohol consumption:  No     Upcoming surgery, procedure or cardioversion:  No    Anticoagulation Episode Summary     Current INR goal:  2.0-3.0   TTR:  93.5 % (1 y)   Next INR check:  3/23/2021   INR from last check:  2.50 (3/16/2021)   Weekly max warfarin dose:     Target end date:     INR check location:     Preferred lab:     Send INR reminders to:  [facility]    Indications    Persistent Atrial Fibrillation [I48.91]           Comments:  home monitor talk to wife about dose Neela   [phone]            Anticoagulation Care Providers     Provider Role Specialty Phone number    Tyrone  Erica TURNER CNP  Cardiology [phone]    Everett Renee MD  Cardiology [phone]

## 2021-06-17 NOTE — PROGRESS NOTES
INR 2.6 Continue current management dosing of Warfarin. Continue  diet of moderate Vitamin K intake. Discussed with pt the need to call with questions or concerns or any change in medication especially herbal medication or OTC. Call with increased bleeding or bruising or any upcoming procedures.

## 2021-06-17 NOTE — PATIENT INSTRUCTIONS - HE
Buck Sinclair    Thank you for coming to the [facility] today for a cardiac evaluation  It was my pleasure to see you today  A good contact for any questions would be Val Angeles  RN @ [phone]    Pacemaker/ defibrillator evaluation shows battery good for another 19 months  High amount of atrial pacing equals 79% and a moderate amount of ventricular pacing 40%  Continues have episodes atrial fibrillation although the atrial flutter burden is only 0.8% and during atrial fibrillation there is not elevated heart rate  You are seen Dr. Payton for pulmonary evaluation and have further reduced  diffusion capacity which could contribute to your desaturation and worsening shortness of breath  A refill of sotalol 160 mg twice daily was sent to the [facility]  other medications  Return in 6 months for comprehensive cardiac arrhythmia device assessment

## 2021-06-17 NOTE — TELEPHONE ENCOUNTER
Telephone Encounter by Gisele Prince RN at 3/9/2021 11:52 AM     Author: Gisele Prince RN Service: -- Author Type: Registered Nurse    Filed: 3/9/2021  3:43 PM Encounter Date: 3/9/2021 Status: Signed    : Gisele Prince RN (Registered Nurse)       ANTICOAGULATION  MANAGEMENT    Assessment     Today's INR result of 1.6 is Subtherapeutic (goal INR of 2.0-3.0)        Warfarin taken as previously instructed     Had a side salad Sunday and 2 mixed drinks may be affecting INR    No new medication/supplements affecting INR    Continues to tolerate warfarin with no reported s/s of bleeding or thromboembolism     Previous INR was Therapeutic    Plan:     Spoke on phone with patient's Neela regarding INR result and instructed:      Warfarin Dosing Instructions:  5 mg booster dose today then continue current warfarin dose    5 mg every Mon; 2.5 mg all other days       (0 % change)    Instructed patient to follow up no later than: one week with home monitor.    Education provided: impact of vitamin K foods on INR, potential interaction between warfarin and alcohol, importance of therapeutic range and target INR goal and significance of current INR result    Neela verbalizes understanding and agrees to warfarin dosing plan.    Instructed to call the [facility] for any changes, questions or concerns. (#[phone])   ?   Gisele Prince RN    Subjective/Objective:      Buck Sinclair, a 76 y.o. male is on warfarin. Buck ling Buck reports:     Home warfarin dose: verbally confirmed home dose with Neela and updated on anticoagulation calendar     Missed doses: No     Medication changes:  No     S/S of bleeding or thromboembolism:  No     New Injury or illness:  No     Changes in diet or alcohol consumption:  Yes: see above     Upcoming surgery, procedure or cardioversion:  No    Anticoagulation Episode Summary     Current INR goal:  2.0-3.0   TTR:  94.3 % (1 y)   Next INR check:  3/23/2021    INR from last check:  1.60 (3/9/2021)   Weekly max warfarin dose:     Target end date:     INR check location:     Preferred lab:     Send INR reminders to:  [facility]    Indications    Persistent Atrial Fibrillation [I48.91]           Comments:  home monitor talk to wife about dose Neela   [phone]            Anticoagulation Care Providers     Provider Role Specialty Phone number    Erica Hanesn CNP  Cardiology [phone]    Everett Renee MD  Cardiology [phone]

## 2021-06-17 NOTE — TELEPHONE ENCOUNTER
Received a faxed INR result for Buck Sinclair  From [facility]  INR result dated 4/27/2021 is 2.3

## 2021-06-17 NOTE — PROGRESS NOTES
INR 2.3 home monitor. Continue current management dosing of Warfarin. Continue  diet of moderate Vitamin K intake. Discussed with pt the need to call with questions or concerns or any change in medication especially herbal medication or OTC. Call with increased bleeding or bruising or any upcoming procedures.

## 2021-06-17 NOTE — PROGRESS NOTES
In clinic device check with Dr Renee.  Please see link for full device report.  Patient was informed of results and next follow up during today's visit.    WOOD RosalesN, RN, CV-BC  Device Nurse  [facility]

## 2021-06-17 NOTE — PROGRESS NOTES
[facility] Heart Care Note  Assessment:  Atrial atrial fibrillation dating back to 1997 with multiple external cardioversions        Atrial fibrillation is very symptomatic with generalized weakness fatigue but not so much palpitations  Chronic amiodarone was partially effective;Continued for many years ; stopped because of photosensitive and other adverse effects   Failed Tikosyn;  Pulmonary vein isolation done on 2 occasions 2011   Post PVI Cardoversion February 12/ 2015 ; CV 10-         Cardioversion  11-        Atrial fibrillation April 25, 2017        CV 5-; relapse < 36 hour  Long-standing advanced dilated cardiomyopathy dating back to 1997       Recent stress nuclear scan; April 30 2014 showed ejection fraction 47% without ischemia        Echocardiogram 21 September 2015 showed ejection fraction equals 50%       Stress nuclear scan 11- EF=63%       Transesophageal echocardiogram December 4, 2017; ejection fraction 50% sinus node dysfunction   MedtronicPacemaker implanted May 1999-dual atrial leads;         generator replacement May 2005          Removal of atrial and ventricular pacing leads with placement of ICD system 6 1 2014 and Medtronic single coil   Chronic anticoagulation with warfarin ; he had excessive skin bleeding from Eliquis   Obesity-substantial weight reduction on conscientious diet   COPD felt be related to previous  exposure   Hypertension  Hyperlipidemia well controlled on statin;   Coronary artery disease with stenting to proximal LAD September 20, 2017  Right hand swelling may be related to the subclavian venous  obstruction       Plan:    Pacemaker/ defibrillator evaluation shows battery good for another 19 months  High amount of atrial pacing equals 79% and a moderate amount of ventricular pacing 40%  Continues have episodes atrial fibrillation although the atrial flutter burden is only 0.8% and during atrial fibrillation there is not elevated  heart rate  You are seen Dr. Payton for pulmonary evaluation and have further reduced  diffusion capacity which could contribute to your desaturation and worsening shortness of breath  A refill of sotalol 160 mg twice daily was sent to the [facility]  Continue  other medications  Return in 6 months for comprehensive cardiac arrhythmia device assessment      Subjective:    I had the opportunity to see.Buck Sinclair , who is a 76 y.o. male with a known history of severe breathlessness  Has been seeing a pulmonary specialist, Dr. Guerrero and is undergone various evaluations  Becomes desaturated and is on continuous supplemental oxygen.  Still work on his nocturnal oxygen supply which should be a continuous delivery system  No awareness of palpitations arrhythmias or atrial fibrillation  No chest pains  Denies fluid buildup or edema  Previously was started on some spironolactone but had elevated creatinine and potassium so this was stopped and his furosemide was increased to 40 mg daily  He has not had Covid infection  Has been vaccinated for Covid  Echocardiogram November 14, 2020 showed ejection fraction 50%  Severe biatrial enlargement      Problem List:  Patient Active Problem List   Diagnosis     Dyslipidemia, goal LDL below 70     Essential hypertension     Persistent Atrial Fibrillation     Cardiomyopathy (H)     ICD (implantable cardioverter-defibrillator) in place     Status post catheter ablation of atrial fibrillation     LIMA (dyspnea on exertion)     Coronary artery disease due to calcified coronary lesion     Hemoptysis     COPD, group B, by GOLD 2017 classification (H)     Hypertrophic obstructive cardiomyopathy (H)     Asthma without status asthmaticus     Mononeuritis     Obesity     Medical History:  Past Medical History:   Diagnosis Date     Anemia      Asthma without status asthmaticus 5/5/2021     BPH (benign prostatic hyperplasia)      COPD, group B, by GOLD 2017 classification (H)      Coronary  artery disease due to calcified coronary lesion      Dyslipidemia, goal LDL below 70      Essential hypertension      History of cardiomyopathy      History of transfusion      Persistent atrial fibrillation (H)      Pneumonia of left lower lobe due to infectious organism 10/4/2017     Skin cancer of trunk      Status post catheter ablation of atrial fibrillation 6/7/2017    PVI 4-2011 (Cryo/PVI + roof line + CTI line) Re-do PVI 7-2011 (RFA/PVI + CFE + VIDYA + confirmed CTI line)     Ventricular tachycardia (H)      Surgical History:  Past Surgical History:   Procedure Laterality Date     CARDIAC DEFIBRILLATOR PLACEMENT       CARDIOVERSION  07/11/2018    x20, last 2/12/15, 10/2015, 11/18/16, 6/16/17 by Lauren Foster CNP     CARDIOVERSION  07/11/2018     COLONOSCOPY N/A 4/28/2017    Procedure: COLONOSCOPY with 2 ascending polyps and 1 transverse polyp;  Surgeon: Jose Whittington MD;  Location: [facility];  Service:      CV CORONARY ANGIOGRAM N/A 9/20/2017    Procedure: Coronary Angiogram;  Surgeon: Sergio Cervantes MD;  Location: [facility];  Service:      EP ICD INSERT       FRACTURE SURGERY Left     wrist     INGUINAL HERNIA REPAIR Left 1967    while in the [employer] in [place] after 13 month in [place]     INSERT / REPLACE / REMOVE PACEMAKER       left hand surgery---tendon repair       OR ABLATE HEART DYSRHYTHM FOCUS  04/2011    Catheter Ablation Atrial Fibrillation PVI Apr 2011 (Cryo+RF-PVI + roof line + CTI line)     OR ABLATE HEART DYSRHYTHM FOCUS  07/2011    Re-do PVI Jul 2011 (RFA-PVI + CFE + VIDYA + confirmation of CTI line)     OR MYERS W/O FACETEC FORAMOT/DSKC 1/2 VRT SEG, CERVICAL      Laminectomy Lumbar;  Recorded: 03/09/2012;     TOTAL SHOULDER REPLACEMENT Right 03/03/2016    Dr. Abernathy of [facility]     Social History:  Social History     Socioeconomic History     Marital status:      Spouse name: Neela     Number of children: Not on file     Years of education: 12     Highest  education level: Not on file   Occupational History     Occupation:      Employer: RETIRED     Occupation:  police     Comment: [place]   Social Needs     Financial resource strain: Not on file     Food insecurity     Worry: Not on file     Inability: Not on file     Transportation needs     Medical: Not on file     Non-medical: Not on file   Tobacco Use     Smoking status: Former Smoker     Packs/day: 1.00     Years: 4.00     Pack years: 4.00     Types: Cigarettes     Quit date: 1968     Years since quittin.3     Smokeless tobacco: Never Used   Substance and Sexual Activity     Alcohol use: Yes     Alcohol/week: 2.0 standard drinks     Types: 2 Cans of beer per week     Comment: 1 beer per week     Drug use: No     Sexual activity: Yes     Partners: Female     Birth control/protection: Post-menopausal   Lifestyle     Physical activity     Days per week: Not on file     Minutes per session: Not on file     Stress: Not on file   Relationships     Social connections     Talks on phone: Not on file     Gets together: Not on file     Attends [employer] service: Not on file     Active member of club or organization: Not on file     Attends meetings of clubs or organizations: Not on file     Relationship status: Not on file     Intimate partner violence     Fear of current or ex partner: Not on file     Emotionally abused: Not on file     Physically abused: Not on file     Forced sexual activity: Not on file   Other Topics Concern     Not on file   Social History Narrative     Not on file       Review of Systems:      General: WNL  Eyes: WNL  Ears/Nose/Throat: WNL  Lungs: WNL  Heart: WNL  Stomach: WNL  Bladder: WNL  Muscle/Joints: WNL  Skin: WNL  Nervous System: WNL  Mental Health: WNL     Blood: WNL        Family History:  Family History   Problem Relation Age of Onset     Cancer Mother         leukemia     Cancer Father         bladder     Cancer Sister         breast with lung met.      Aneurysm Sister      CABG Brother      CABG Brother      Valvular heart disease Brother         valve replacement         Allergies:  Allergies   Allergen Reactions     Adhesive Other (See Comments)     ADHESIVE TAPE; SKIN IRRITATION  Foam tape:  Skin removed with tape removal     Amiodarone      ADVERSE REACTION.  Sunlight sensitivity.     Lisinopril Cough       Medications:  Current Outpatient Medications   Medication Sig Dispense Refill     ACETAMINOPHEN (TYLENOL ORAL) Take 1,000 mg by mouth 2 (two) times a day.        albuterol (PROAIR HFA;PROVENTIL HFA;VENTOLIN HFA) 90 mcg/actuation inhaler Inhale 2 puffs every 6 (six) hours as needed for wheezing. 1 Inhaler 11     amoxicillin (AMOXIL) 500 MG tablet Take prior to the Dentist       ascorbic acid (VITAMIN C) 1000 MG tablet Take 1,000 mg by mouth daily.       atorvastatin (LIPITOR) 40 MG tablet Take 40 mg by mouth at bedtime.       budesonide-formoterol (SYMBICORT) 160-4.5 mcg/actuation inhaler Inhale 2 puffs 2 (two) times a day.       co-enzyme Q-10 30 mg capsule Take 100 mg by mouth daily.       furosemide (LASIX) 40 MG tablet Take 1 tablet (40 mg total) by mouth daily. 90 tablet 3     losartan (COZAAR) 50 MG tablet Take 1 tablet (50 mg total) by mouth daily. 90 tablet 1     MAG 64 64 mg TbEC delayed-release tablet TAKE 1 TABLET(64 MG) BY MOUTH TWICE DAILY 180 tablet 1     multivitamin (MULTIVITAMIN) per tablet Take 1 tablet by mouth daily.       omega 3-dha-epa-fish oil (FISH OIL) 1,000 mg (120 mg-180 mg) cap Take 2 tablets by mouth 2 (two) times a day.        OXYGEN-AIR DELIVERY SYSTEMS [facility] Use As Directed. 2 L at rest/night and 3-4L with activity  [facility]       polyethylene glycol (MIRALAX) 17 gram packet Take 17 g by mouth daily.       sotaloL (BETAPACE) 160 MG tablet Take 1 tablet (160 mg total) by mouth 2 (two) times a day. 180 tablet 5     tiotropium (SPIRIVA) 18 mcg inhalation capsule Place 18 mcg into inhaler and inhale daily.       VITAMIN D2 50,000  "unit capsule Take 50,000 Units by mouth 2 (two) times a week. SUN and WED  3     warfarin ANTICOAGULANT (COUMADIN/JANTOVEN) 2.5 MG tablet Takes 1 tablet (2.5mg) to 2 tablets (5mg) by mouth daily, as directed.  Adjust dose based on INR results. 130 tablet 1     No current facility-administered medications for this visit.          Surgical site  ICD is well-healed     Device interrogation  Transit rhythm is sinus bradycardia in the 30s with first-degree AV block  About 80% atrial pacing  40% ventricular pacing  Multiple short burst of atrial tachycardia-A. fib burden 0.8%  Did have a 7-hour episode of atrial fibrillation over\6/2020; during atrial fibrillation heart rate is controlled and not above 120 bpm  ATP is effective 9.5%  Lead function satisfactory  Battery good for another 19 months    Objective:   Vital signs:  /68 (Patient Site: Left Arm, Patient Position: Sitting, Cuff Size: Adult Large)   Pulse 86   Resp 16   Ht 6' 3\" (1.905 m)   Wt (!) 288 lb (130.6 kg)   BMI 36.00 kg/m      Wearing supplemental oxygen      Physical Exam:    GENERAL APPEARANCE: Alert, cooperative and in no acute distress.  HEENT: No scleral icterus. No Xanthelasma. Oral mucous membranes pink and moist.  NECK: JVP snormal cm. No Hepatojugular reflux. Thyroid not  Palpable  CHEST: clear to auscultation and percussion  CARDIOVASCULAR: S1, S2 without murmur    Brachial, radial  pulses are intact and symmetric.   No carotid bruits noted.  ABDOMEN: Non tender. BS+. No bruits.  EXTREMITIES: No cyanosis, clubbing or edema.    Lab Results:  LIPIDS:  Lab Results   Component Value Date    CHOL 116 10/08/2020    CHOL 133 02/04/2020    CHOL 127 08/02/2019     Lab Results   Component Value Date    HDL 51 10/08/2020    HDL 52 02/04/2020    HDL 47 08/02/2019     Lab Results   Component Value Date    LDLCALC 56 10/08/2020    LDLCALC 66 02/04/2020    LDLCALC 65 08/02/2019     Lab Results   Component Value Date    TRIG 44 10/08/2020    TRIG 75 " 02/04/2020    TRIG 74 08/02/2019     No components found for: CHOLHDL    BMP:  Lab Results   Component Value Date    CREATININE 1.36 (H) 12/16/2020    BUN 37 (H) 12/16/2020     12/16/2020    K 5.0 12/16/2020     (H) 12/16/2020    CO2 19 (L) 12/16/2020         This note has been dictated using voice recognition software. Any grammatical or context distortions are unintentional and inherent to the software.  Everett Renee MD  [facility]  [phone]

## 2021-06-17 NOTE — TELEPHONE ENCOUNTER
Telephone Encounter by Natalie Lawler at 5/17/2021  2:57 PM     Author: Natalie Lawler Service: -- Author Type: --    Filed: 5/17/2021  2:59 PM Encounter Date: 5/17/2021 Status: Addendum    : Natalie Lawler    Related Notes: Original Note by Natalie Lawler filed at 5/17/2021  2:59 PM       No PA needed, medication is covered.  Per CMM key the pharmacy was trying to process under a workers compensation claim.     Called pharmacy and made them aware Part D plan covers medication.  Central PA team does not initiate requests through  insurances, this needs to be done by pharmacy.  Pharmacy will call help desk with any questions.

## 2021-06-17 NOTE — PROGRESS NOTES
Pulmonary Clinic Follow-up Visit    Assessment/Plan:  72 year old male with a history of tobacco dependence in remission, CAD s/p PCI/stents, afib s/p PVL with ICD/PPM on anticoagulation, history of cavitary lesion and extensive pneumonia in LLL in Oct 2017, subsequent recurrent LLL pneumonia, since resolved, presenting for follow up. Repeat PFT's in 2020 actually showed improved FEV1/FVC ratio, stable FEV1 but substantial worsening of the DLco (almost 40% lower than in 2018). This is consistent with worsening exertional hypoxemia and probably pulmonary vascular disease. Overall appears to be fairly stable.      Plan:  #COPD, GOLD class B (CAT >10, few exacerbations/low risk for hospitalization). Minimal smoking history so this is probably due to his work as a . PFTs 2017 showed mild obstruction and normal DLco. Mild exertional hypoxemia noted on 6MWT in 2019. Now on supplemental oxygen as of 2020.  - continue budesonide-formoterol 160-4.5 mcg two inhalations BID with spacer; rinse/gargle/spit water after use. No dose changes today.  - continue tiotropium HandiHaler one inhalation daily  - Cont albuterol as needed  - encouraged exercise, weight loss as able  - continue supplemental O2 for goal O2 sat 88-92%   Due to desaturation of SpO2 less than or equal to 88% on room air at rest from COPD, home oxygen therapy will benefit my patient's condition. The patient has tried other medications including inhaled and nebulized therapy and steroids with limited success and oxygen is still required. The patient is mobile in the home and requires portability.  - recommended another round of pulm rehab at [facility], once at [facility]. He is going to think about it.  - no indication for LDCT as his smoking history is too minimal.   - UTD w/ pneumonia and flu vaccines. Rec flu shot every Fall. UTD with covid-19 vaccination as well.     #exertional hypoxemia, worsening DLco, significant LIMA: suspicion for PVD/pulm HTN since  his LIMA seems out of proportion to PFT abnormalities from 2018. Likely due to extensive left-sided heart disease and possible valvular disease as well as hypoxemic lung disease (emphysema). Significant worsening of DLco over the last 2 years. Last echo 2019 did not show markedly abnormal PA pressures but was a limited study. He did have borderline reduced EF of 45%. heart disease stable per patient.      #LLL nodule: decreasing in size on 3 month f/u scan after abx. 9mm -> 7mm and associated with scarring  - no further imaging needed unless recurrent symptoms     #Hemoptysis: no further recurrence. Likely was due to the prior LLL pneumonia in the setting of anticoagulation.  - continue INR goal close to 2, as discussed with Dr. Renee   - he'll let me know if he has any concerning symptoms.    #Suspected nocturnal hypoxemia:  - overnight oximetry ordered.     Follow up in 6 months.    CCx: follow up of hemoptysis, and COPD GOLD class B    HPI: Interim history: I last saw Buck on 11/10/2020. Since that time, he reports he's doing fairly well. Stamina, dyspnea about the same. He is doing well with oxygen. But not using O2 at night due to pulse dose, unable to tolerate this at night.     ROS:  A 12-system review was obtained and was negative with the exception of the symptoms endorsed in the history of present illness.    PMH:  Past Medical History:   Diagnosis Date     Anemia      Asthma without status asthmaticus 5/5/2021     BPH (benign prostatic hyperplasia)      COPD, group B, by GOLD 2017 classification (H)      Coronary artery disease due to calcified coronary lesion      Dyslipidemia, goal LDL below 70      Essential hypertension      History of cardiomyopathy      History of transfusion      Persistent atrial fibrillation (H)      Pneumonia of left lower lobe due to infectious organism 10/4/2017     Skin cancer of trunk      Status post catheter ablation of atrial fibrillation 6/7/2017    PVI 4-2011  (Cryo/PVI + roof line + CTI line) Re-do PVI 7-2011 (RFA/PVI + CFE + VIDYA + confirmed CTI line)     Ventricular tachycardia (H)        PSH:  Past Surgical History:   Procedure Laterality Date     CARDIAC DEFIBRILLATOR PLACEMENT       CARDIOVERSION  07/11/2018    x20, last 2/12/15, 10/2015, 11/18/16, 6/16/17 by Lauren Foster CNP     CARDIOVERSION  07/11/2018     COLONOSCOPY N/A 4/28/2017    Procedure: COLONOSCOPY with 2 ascending polyps and 1 transverse polyp;  Surgeon: Jose Whittington MD;  Location: [facility] GI;  Service:      CV CORONARY ANGIOGRAM N/A 9/20/2017    Procedure: Coronary Angiogram;  Surgeon: Sergio Cervantes MD;  Location: [facility] Cath Lab;  Service:      EP ICD INSERT       FRACTURE SURGERY Left     wrist     INGUINAL HERNIA REPAIR Left 1967    while in the [employer] [place] month in [place]     INSERT / REPLACE / REMOVE PACEMAKER       left hand surgery---tendon repair       PA ABLATE HEART DYSRHYTHM FOCUS  04/2011    Catheter Ablation Atrial Fibrillation PVI Apr 2011 (Cryo+RF-PVI + roof line + CTI line)     PA ABLATE HEART DYSRHYTHM FOCUS  07/2011    Re-do PVI Jul 2011 (RFA-PVI + CFE + VIDYA + confirmation of CTI line)     PA MYERS W/O FACETEC FORAMOT/DSKC 1/2 VRT SEG, CERVICAL      Laminectomy Lumbar;  Recorded: 03/09/2012;     TOTAL SHOULDER REPLACEMENT Right 03/03/2016    Dr. Abernathy of [facility]       Allergies:  Allergies   Allergen Reactions     Adhesive Other (See Comments)     ADHESIVE TAPE; SKIN IRRITATION  Foam tape:  Skin removed with tape removal     Amiodarone      ADVERSE REACTION.  Sunlight sensitivity.     Lisinopril Cough       Family HX:  Family History   Problem Relation Age of Onset     Cancer Mother         leukemia     Cancer Father         bladder     Cancer Sister         breast with lung met.     Aneurysm Sister      CABG Brother      CABG Brother      Valvular heart disease Brother         valve replacement       Social Hx:  Social History      Socioeconomic History     Marital status:      Spouse name: Neela     Number of children: Not on file     Years of education: 12     Highest education level: Not on file   Occupational History     Occupation:      Employer: RETIRED     Occupation:  police     Comment: [place]   Social Needs     Financial resource strain: Not on file     Food insecurity     Worry: Not on file     Inability: Not on file     Transportation needs     Medical: Not on file     Non-medical: Not on file   Tobacco Use     Smoking status: Former Smoker     Packs/day: 1.00     Years: 4.00     Pack years: 4.00     Types: Cigarettes     Quit date: 1968     Years since quittin.3     Smokeless tobacco: Never Used   Substance and Sexual Activity     Alcohol use: Yes     Alcohol/week: 2.0 standard drinks     Types: 2 Cans of beer per week     Comment: 1 beer per week     Drug use: No     Sexual activity: Yes     Partners: Female     Birth control/protection: Post-menopausal   Lifestyle     Physical activity     Days per week: Not on file     Minutes per session: Not on file     Stress: Not on file   Relationships     Social connections     Talks on phone: Not on file     Gets together: Not on file     Attends Hoahaoism service: Not on file     Active member of club or organization: Not on file     Attends meetings of clubs or organizations: Not on file     Relationship status: Not on file     Intimate partner violence     Fear of current or ex partner: Not on file     Emotionally abused: Not on file     Physically abused: Not on file     Forced sexual activity: Not on file   Other Topics Concern     Not on file   Social History Narrative     Not on file       Current Meds:  Current Outpatient Medications   Medication Sig Dispense Refill     ACETAMINOPHEN (TYLENOL ORAL) Take 1,000 mg by mouth 2 (two) times a day.        albuterol (PROAIR HFA;PROVENTIL HFA;VENTOLIN HFA) 90 mcg/actuation inhaler Inhale 2 puffs  every 6 (six) hours as needed for wheezing. 1 Inhaler 11     amoxicillin (AMOXIL) 500 MG tablet Take prior to the Dentist       ascorbic acid (VITAMIN C) 1000 MG tablet Take 1,000 mg by mouth daily.       atorvastatin (LIPITOR) 40 MG tablet Take 40 mg by mouth at bedtime.       budesonide-formoterol (SYMBICORT) 160-4.5 mcg/actuation inhaler Inhale 2 puffs 2 (two) times a day.       co-enzyme Q-10 30 mg capsule Take 100 mg by mouth daily.       furosemide (LASIX) 40 MG tablet Take 1 tablet (40 mg total) by mouth daily. 90 tablet 3     losartan (COZAAR) 50 MG tablet Take 1 tablet (50 mg total) by mouth daily. 90 tablet 1     MAG 64 64 mg TbEC delayed-release tablet TAKE 1 TABLET(64 MG) BY MOUTH TWICE DAILY 180 tablet 1     multivitamin (MULTIVITAMIN) per tablet Take 1 tablet by mouth daily.       omega 3-dha-epa-fish oil (FISH OIL) 1,000 mg (120 mg-180 mg) cap Take 2 tablets by mouth 2 (two) times a day.        OXYGEN-AIR DELIVERY SYSTEMS [facility] Use As Directed. 2 L at rest/night and 3-4L with activity  [facility]       polyethylene glycol (MIRALAX) 17 gram packet Take 17 g by mouth daily.       sotaloL (BETAPACE) 80 MG tablet TAKE 2 TABLETS(160 MG) BY MOUTH TWICE DAILY. 360 tablet 0     tiotropium (SPIRIVA) 18 mcg inhalation capsule Place 18 mcg into inhaler and inhale daily.       VITAMIN D2 50,000 unit capsule Take 50,000 Units by mouth 2 (two) times a week. SUN and WED  3     warfarin ANTICOAGULANT (COUMADIN/JANTOVEN) 2.5 MG tablet Takes 1 tablet (2.5mg) to 2 tablets (5mg) by mouth daily, as directed.  Adjust dose based on INR results. 130 tablet 1     No current facility-administered medications for this visit.        Physical Exam:  There were no vitals taken for this visit.  Gen: alert, oriented, no distress  HEENT: nasal turbinates are unremarkable, no oropharyngeal lesions, no cervical or supraclavicular lymphadenopathy  CV: RRR, no M/G/R  Resp: CTAB, no focal crackles or wheezes  Abd: soft, nontender, no palpable  organomegaly  Skin: no apparent rashes  Ext: no cyanosis, clubbing or edema  Neuro: alert, nonfocal    Labs:  Reviewed  Dec 2018  Chem panel wnl  TSH wnl  hgb 12.1 Oct 2018    Previous testing  DONNA neg  blasto neg  Histo testing neg  c-ANCA neg  HIV neg  RF neg    Bronch/LLL BAL cultures neg      Imaging studies:  CT chest April 2018  IMPRESSION:   CONCLUSION:  1.  Mild scarring and slight bronchiectasis present at both lung bases. No active pneumonia identified.    CT chest 7/15/2019  IMPRESSION:   CONCLUSION:   1.  Nodular opacity of the left lower lobe of interest on 04/17/2019 is less conspicuous today and probably explained by scarring. Additional 6 month chest CT follow-up is recommended.  2.  Emphysema and scattered areas of scarring elsewhere in both lungs.  3.  Advanced multivessel coronary artery disease.      Left Ventricle: Normal left ventricular size.The estimated left ventricular ejection fraction is 45%.Mild concentric hypertrophy noted. Normal septal motion. Unable to assess left ventricular diastolic function given patient is in atrial fibrillation.    Wall Scoring: Resting Even with Definity contrast, left ventricular wall motion is difficult to evaluate. Suspicious of inferior basilar hypokinesis on top of mild global hypokinesis.    Right Ventricle: Normal right ventricular size and systolic function. TAPSE is normal, which is consistent with normal right ventricular systolic function. Pacer lead is present in the ventricle.    Left Atrium: Left atrial volume is severely increased.    Tricuspid Valve: Tricuspid valve not well visualized. There is no evidence of tricuspid stenosis. Mild to moderate tricuspid valve regurgitation. The TR peak velocity is over estimated. Right ventricular systolic pressure is likely within normal limits.     Compared to echocardiogram from August 29, 2018, the findings are similar but both echoes are technically challenging.  There is more suspicion of more  prominent inferior basilar hypokinesis on the current study.  The TR jet that evaluated the right ventricular systolic pressure, is contaminated and suspect relatively normal right ventricular systolic pressure estimate.    PFTs 2018  FEV1/FVC is 0.56 and is reduced.  FEV1 is 73% predicted and is reduced.  FVC is 96% predicted and normal.  There was no improvement in spirometry after a single inhaled dose of bronchodilator.  TLC is 99% predicted and is normal.  RV is 113% predicted and is normal.  DLCO is 93% predicted and is normal when it   is corrected for hemoglobin.     Impression:  Full Pulmonary Function Test is abnormal.  PFTs are consistent with mild obstructive disease.  Spirometry is not consistent with reversibility.  There is no hyperinflation.  There is no air-trapping.  Diffusion capacity when corrected for hemoglobin is normal.  Declines in both FEV1 and TLC since 2009 with overall preservation of diffusing capacity.    PFTs 11/10/2029  FEV1 2.42L 68%  FVC 76%  No BD response  Ratio 0.66 (LLN 0.6)  DLco 46% han for hgb  Flow vol curve suggests obstruction.  Impression: no obstruction based on ratio >LLN but FV curve does suggest some obstruction. Significant decline in DLco compared to DLco.     July 2019  SIX MINUTE WALK TEST / HOME O2 EVALUATION     SpO2 at rest on RA 96%  SpO2 started to drop after 2 1/2 minutes walking. SpO2 after walking 2 1/2 minutes on RA was 88%  SpO2 after walking 6 minutes on RA was 87%  Distance covered 320.04 meters.  Recovery phase, SpO2 after 1 minute rest on RA was 87%  Recovery phase, SpO2 after 2 minute rest on RA was 97%     Impression:   Significant desaturation with activities.   Patient does qualify for O2 supplementation with activity.    Hugh Payton MD (Avi)  [facility]  Pager [phone]  Clinic [phone]

## 2021-06-17 NOTE — TELEPHONE ENCOUNTER
Telephone Encounter by Gisele Prince RN at 2/2/2021  1:42 PM     Author: Gisele Prince RN Service: -- Author Type: Registered Nurse    Filed: 2/2/2021  1:48 PM Encounter Date: 2/2/2021 Status: Signed    : Gisele Prince RN (Registered Nurse)       ANTICOAGULATION  MANAGEMENT    Assessment     Today's INR result of 3.3 is Supratherapeutic (goal INR of 2.0-3.0)        Warfarin taken as previously instructed    Change in alcohol intake may be affecting INR- no beer this past week    No new medication/supplements affecting INR    Continues to tolerate warfarin with no reported s/s of bleeding or thromboembolism     Previous INR was Therapeutic    Plan:     Spoke on phone with patient's spouse Neela regarding INR result and instructed:      Warfarin Dosing Instructions:  hold warfarin dose today then continue current warfarin dose    5 mg every Mon, Fri; 2.5 mg all other days      (0 % change)    Instructed patient to follow up no later than: one week with home monitor    Education provided: importance of consistent vitamin K intake, potential interaction between warfarin and alcohol, importance of therapeutic range and target INR goal and significance of current INR result    Neela verbalizes understanding and agrees to warfarin dosing plan.    Instructed to call the [facility] for any changes, questions or concerns. (#[phone])   ?   Gisele Prince RN    Subjective/Objective:      Buck Sinclair, a 75 y.o. male is on warfarin. Buck      Buck reports:     Home warfarin dose: verbally confirmed home dose with Neela and updated on anticoagulation calendar     Missed doses: No     Medication changes:  No     S/S of bleeding or thromboembolism:  No     New Injury or illness:  No     Changes in diet or alcohol consumption:  Yes: did not have beer this past week     Upcoming surgery, procedure or cardioversion:  No    Anticoagulation Episode Summary     Current INR goal:  2.0-3.0   TTR:   96.0 % (1 y)   Next INR check:  2/9/2021   INR from last check:  3.30 (2/2/2021)   Weekly max warfarin dose:     Target end date:     INR check location:     Preferred lab:     Send INR reminders to:  [facility] HEART [facility]    Indications    Persistent Atrial Fibrillation [I48.91]           Comments:  home monitor talk to wife about dose Neela   [phone]            Anticoagulation Care Providers     Provider Role Specialty Phone number    Erica Hansen CNP  Cardiology [phone]    Everett Renee MD  Cardiology [phone]

## 2021-06-17 NOTE — TELEPHONE ENCOUNTER
Received a faxed INR result for Buck Sinclair  From [facility]  INR result dated 5/11/2021 is 2.0

## 2021-06-17 NOTE — PROGRESS NOTES
INR 2.3 at home After talking with pt and discussing history of greens/salads and medication change. Pt will  continue  with current diet and dosing of Warfarin.  Continue with moderation of Vit K and green leafy vegetables. Cautioned to call with increase bruising or bleeding. Reminded to call with medication change especially antibiotic. Call with any questions or concerns or any up coming procedures. Cautioned about using Herbal medication.

## 2021-06-17 NOTE — TELEPHONE ENCOUNTER
"Phone call from Buck.  States he read Dr. Payton's note from last night.  He is thinking some \"wires may have gotten crossed?\"    He has been using a pulse dose concentrator that he normally uses during the day at night-time as well.   Needs new orders for a continuous flow oxygen concentrator to be used at night-time (he does not already have this).     Looks like orders were sent to [facility] on 5/10.  Contacted [facility] and they DO have the orders.  Will contact patient today and get set up with concentrator for night-time use.    "

## 2021-06-17 NOTE — PATIENT INSTRUCTIONS - HE
Patient Instructions by Hugh Payton MD at 4/11/2019  1:00 PM     Author: Hugh Payton MD Service: -- Author Type: Physician    Filed: 4/11/2019  1:28 PM Encounter Date: 4/11/2019 Status: Signed    : Hugh Payton MD (Physician)       Patient Education     Hemoptysis    Hemoptysis is the medical term for coughing up blood. There are many causes for this, including minor illnesses like bronchitis. Hemoptysis can also be an early sign of a more serious illness, like a blood clot in the lung (pulmonary embolism), cancer, tuberculosis, or pneumonia.  Less common causes of hemoptysis can be hard to diagnose in an emergency department or a clinic. More testing will be needed if the symptoms continue.  Home care    Stay away from cigarette smoke. Smoke irritates the bronchial passages.    Unless you are taking daily aspirin to prevent stroke or heart attack, do not take aspirin or products that contain aspirin. Aspirin affects how readily the blood clots. Medicines that prevent clotting may make hemoptysis worse.    If you have a lung infection, drinking extra fluid will help loosen secretions in the lungs.    Over-the-counter cough medicines that contain dextromethorphan may help reduce coughing. Check with a medical provider before taking dextromethorphan if you have a chronic illness, are pregnant, or take daily medications.    If you were prescribed an antibiotic, take it until it is all gone. Take it even if you are feeling better after only a few days.  Follow-up care  Follow up with your healthcare provider, or as advised.  When to seek medical advice  Call your healthcare provider right away if any of these occur:    Fever of 100.4 F (38 C) or higher, or as directed by your healthcare provider  Call 911  Call 911 if any of these occur:    Coughing up an increased amount of blood    Trouble breathing, wheezing, or pain with breathing    Chest pain or chest pressure    Fainting or losing  consciousness    Rapid heartbeat    Weakness or dizziness  Date Last Reviewed: 9/13/2015 2000-2017 [employer]. [address] [place] [address], [place] [address]. All rights reserved. This information is not intended as a substitute for professional medical care. Always follow your healthcare professional's instructions.

## 2021-06-17 NOTE — PROGRESS NOTES
DME Provider: Isidra  Date Faxed: 5/10/21  Ordering Provider: Dr. Payton  Equipment ordered: Oxygen and Overnight Oximetry on RA  (he currently has [employer], but needs a big concentrator for at night)

## 2021-06-17 NOTE — PROGRESS NOTES
ANTICOAGULATION  MANAGEMENT-Patient Home Monitoring Result    Assessment     Therapeutic INR result of 2.2 (goal INR of 2.0-3.0) received via fax from [employer] home INR monitoring company.        Previous INR was Therapeutic    Buck Sinclair last contacted by phone: 3/16/21    Plan     Per home monitoring agreement with patient, patient was NOT contacted regarding therapeutic result today.  Patient is to continue current dose and continue to check INR with home monitor per protocol.  ?   Fatoumata Ochoa RN    Subjective/Objective:      Buck Sinclair, a 76 y.o. male  is established on warfarin using a home INR monitor.    Anticoagulation Episode Summary     Current INR goal:  2.0-3.0   TTR:  93.5 % (1 y)   Next INR check:  5/25/2021   INR from last check:  2.20 (5/18/2021)   Weekly max warfarin dose:     Target end date:     INR check location:     Preferred lab:     Send INR reminders to:  [facility]    Indications    Persistent Atrial Fibrillation [I48.91]           Comments:  home monitor talk to wife about dose [facility]   [phone]            Anticoagulation Care Providers     Provider Role Specialty Phone number    Erica Hansen CNP  Cardiology [phone]    Everett Renee MD  Cardiology [phone]

## 2021-06-17 NOTE — PROGRESS NOTES
INR 2.4 at home. Continue current management dosing of Warfarin. Continue  diet of moderate Vitamin K intake. Discussed with pt the need to call with questions or concerns or any change in medication especially herbal medication or OTC. Call with increased bleeding or bruising or any upcoming procedures.

## 2021-06-17 NOTE — TELEPHONE ENCOUNTER
ANTICOAGULATION  MANAGEMENT-Patient Home Monitoring Result    Assessment     Therapeutic INR result of 2.2 (goal INR of 2.0-3.0) received via fax from [employer] home INR monitoring company.        Previous INR was Therapeutic    Buck Sinclair last contacted by phone: 3/16/2021    Plan     Per home monitoring agreement with patient, patient was NOT contacted regarding therapeutic result today.  Patient is to continue current dose and continue to check INR with home monitor per protocol.  ?   Gisele Prince RN    Subjective/Objective:      Buck Sinclair, a 76 y.o. male  is established on warfarin using a home INR monitor.    Anticoagulation Episode Summary     Current INR goal:  2.0-3.0   TTR:  93.5 % (1 y)   Next INR check:  6/8/2021   INR from last check:  2.20 (5/25/2021)   Weekly max warfarin dose:     Target end date:     INR check location:     Preferred lab:     Send INR reminders to:  [facility] HEART CARE    Indications    Persistent Atrial Fibrillation [I48.91]           Comments:  home monitor talk to wife about dose [facility]   [phone]            Anticoagulation Care Providers     Provider Role Specialty Phone number    Erica Hansen CNP  Cardiology [phone]    Everett Renee MD  Cardiology [phone]

## 2021-06-17 NOTE — TELEPHONE ENCOUNTER
Received a faxed INR result for Buck Sinclair  From [facility]  INR result dated 5/4/2021 is 2.0

## 2021-06-17 NOTE — PROGRESS NOTES
INR 2.7 at home. Continue current management dosing of Warfarin. Continue  diet of moderate Vitamin K intake. Discussed with pt the need to call with questions or concerns or any change in medication especially herbal medication or OTC. Call with increased bleeding or bruising or any upcoming procedures.

## 2021-06-17 NOTE — TELEPHONE ENCOUNTER
ANTICOAGULATION  MANAGEMENT-Patient Home Monitoring Result    Assessment     Therapeutic INR result of 2.30 (goal INR of 2.0-3.0) received via fax from [employer] home INR monitoring company.        Previous INR was Therapeutic    Buck Sinclair last contacted by phone: 3/16/21    Plan     Per home monitoring agreement with patient, patient was NOT contacted regarding therapeutic result today.  Patient is to continue current dose and continue to check INR with home monitor per protocol.  ?   Michael Holliday RN    Subjective/Objective:      Buck Sinclair, a 76 y.o. male  is established on warfarin using a home INR monitor.    Anticoagulation Episode Summary     Current INR goal:  2.0-3.0   TTR:  93.3 % (11.9 mo)   Next INR check:  4/27/2021   INR from last check:  2.10 (4/20/2021)   Weekly max warfarin dose:     Target end date:     INR check location:     Preferred lab:     Send INR reminders to:  [facility] HEART CARE    Indications    Persistent Atrial Fibrillation [I48.91]           Comments:  home monitor talk to wife about dose [facility]   [phone]            Anticoagulation Care Providers     Provider Role Specialty Phone number    Erica Hansen CNP  Cardiology [phone]    Everett Renee MD  Cardiology [phone]

## 2021-06-17 NOTE — PROGRESS NOTES
Pulmonary Clinic Follow-up Visit    Assessment/Plan:  72 year old male with a history of tobacco dependence in remission, CAD s/p PCI/stents, afib s/p PVI with ICD/PPM on anticoagulation, presented 10/4 with hemoptysis, found to have cavitary lesion and extensive pneumonia in LLL, subsequent recurrent LLL pneumonia, now presenting for follow up of above and his COPD. Recent repeat chest CT showed resolved pneumonia and some bronchiectasis/scarring in the LLL, which is likely a sequela of prior infection. Symptomatically he appears significantly improved since adding the spacer to high-dose ICS/LABA and starting pulmonary rehab. CAT 11 today so he is still in GOLD class D COPD.    Plan:  - continue budesonide-formoterol 160-4.5 mcg two inhalations BID WITH SPACER; rinse/gargle/spit water after use. Will consider tapering at next visit/after pulm rehab if he remains clinically stable  - continue tiotropium HandiHaler one inhalation daily  - Cont albuterol as needed  - Cont pulm rehab - clear benefit thus far.  - encouraged exercise, weight loss as able  - UTD with flu, pneumonia vaccinations.    Follow up in 6 months. He will call in the interim if he has any questions/concerns.    CCx: follow up of hemoptysis, cavitary pneumonia (resolved) and COPD GOLD class D    HPI: Interim history: Buck was last seen by myself on 2/16/18. At that visit we told him to use the ICS/LABA with a spacer continue on the Spiriva and add albuterol rescue inhaler to be used as needed. Repeat CT chest was unremarkable. Since our last visit, he's actually doing quite well. Less cough. Rare use of FAVIAN (1-2 times per week). 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 He is participating in pulmonary rehab, has completed 6 sessions (started approx 1 month ago) out of 18. Still limited.   by breathing with most activities but says he is able to do more with less dyspnea and cough.  CAT 11 today  Meds reviewed, using high-dose  Symbicort with spacer, rinse/gargle after each use; using Spiriva daily and rarely using FAVIAN.    ROS:  A 12-system review was obtained and was negative with the exception of the symptoms endorsed in the history of present illness.    PMH:  Past Medical History:   Diagnosis Date     Anemia      BPH (benign prostatic hyperplasia)      COPD (chronic obstructive pulmonary disease)      Coronary artery disease due to calcified coronary lesion      Dyslipidemia, goal LDL below 70      Essential hypertension      History of cardiomyopathy      History of transfusion      Persistent atrial fibrillation      Pneumonia of left lower lobe due to infectious organism 10/4/2017     Skin cancer of trunk      Status post catheter ablation of atrial fibrillation 6/7/2017    PVI 4-2011 (Cryo/PVI + roof line + CTI line) Re-do PVI 7-2011 (RFA/PVI + CFE + VIDYA + confirmed CTI line)     Ventricular tachycardia        PSH:  Past Surgical History:   Procedure Laterality Date     CARDIAC DEFIBRILLATOR PLACEMENT       CARDIOVERSION      x20, last 2/12/15, 10/2015, 11/18/16, 6/16/17 by Lauren Foster CNP     COLONOSCOPY N/A 4/28/2017    Procedure: COLONOSCOPY with 2 ascending polyps and 1 transverse polyp;  Surgeon: Jose Whittington MD;  Location: [facility] GI;  Service:      CV CORONARY ANGIOGRAM N/A 9/20/2017    Procedure: Coronary Angiogram;  Surgeon: Sergio Cervantes MD;  Location: [facility] Cath Lab;  Service:      EP ICD INSERT       FRACTURE SURGERY Left     wrist     INGUINAL HERNIA REPAIR Left 1967    while in the [employer] in [place] after 13 month in [place]     INSERT / REPLACE / REMOVE PACEMAKER       MN ABLATE HEART DYSRHYTHM FOCUS  04/2011    Catheter Ablation Atrial Fibrillation PVI Apr 2011 (Cryo+RF-PVI + roof line + CTI line)     MN ABLATE HEART DYSRHYTHM FOCUS  07/2011    Re-do PVI Jul 2011 (RFA-PVI + CFE + VIDYA + confirmation of CTI line)     MN MYERS W/O FACETEC FORAMOT/DSKC 1/2 VRT SEG, CERVICAL      Laminectomy Lumbar;   Recorded: 03/09/2012;     TOTAL SHOULDER REPLACEMENT Right 03/03/2016    Dr. Abernathy of [facility]       Allergies:  Allergies   Allergen Reactions     Adhesive Other (See Comments)     ADHESIVE TAPE; SKIN IRRITATION     Amiodarone      ADVERSE REACTION.  Sunlight sensitivity.     Lisinopril Cough       Family HX:  Family History   Problem Relation Age of Onset     Cancer Mother      leukemia     Cancer Father      bladder     Cancer Sister        Social Hx:  Social History     Social History     Marital status:      Spouse name: Neela     Number of children: N/A     Years of education: 12     Occupational History      Retired     [employer]      [employer]     Social History Main Topics     Smoking status: Former Smoker     Packs/day: 0.50     Years: 4.00     Types: Cigarettes     Quit date: 1/1/1968     Smokeless tobacco: Never Used     Alcohol use 1.2 oz/week     2 Cans of beer per week      Comment: 1 beer per week     Drug use: No     Sexual activity: Yes     Partners: Female     Birth control/ protection: Post-menopausal     Other Topics Concern     Not on file     Social History Narrative       Current Meds:  Current Outpatient Prescriptions   Medication Sig Dispense Refill     ACETAMINOPHEN (TYLENOL ORAL) Take 1,000 mg by mouth 2 (two) times a day.        albuterol (PROAIR HFA;PROVENTIL HFA;VENTOLIN HFA) 90 mcg/actuation inhaler Inhale 2 puffs every 6 (six) hours as needed for wheezing. 1 Inhaler 11     ascorbic acid (VITAMIN C) 1000 MG tablet Take 1,000 mg by mouth daily.       atorvastatin (LIPITOR) 40 MG tablet Take 40 mg by mouth daily.       budesonide-formoterol (SYMBICORT) 160-4.5 mcg/actuation inhaler Inhale 2 puffs 2 (two) times a day.       clopidogrel (PLAVIX) 75 mg tablet Take 1 tablet (75 mg total) by mouth daily. 30 tablet 11     co-enzyme Q-10 30 mg capsule Take 100 mg by mouth daily.       diltiazem (CARTIA XT) 240 MG 24 hr capsule Take 1 capsule (240 mg  total) by mouth daily. 90 capsule 1     furosemide (LASIX) 20 MG tablet TAKE 1 TABLET(20 MG) BY MOUTH DAILY 90 tablet 1     losartan (COZAAR) 50 MG tablet Take 1 tablet (50 mg total) by mouth daily. 90 tablet 5     MAG 64 64 mg TbEC delayed-release tablet TAKE 1 TABLET BY MOUTH TWICE DAILY 180 tablet 1     multivitamin (MULTIVITAMIN) per tablet Take 1 tablet by mouth daily.       omega 3-dha-epa-fish oil (FISH OIL) 1,000 mg (120 mg-180 mg) cap Take 2 tablets by mouth 2 (two) times a day.        polyethylene glycol (MIRALAX) 17 gram packet Take 17 g by mouth daily.       sotalol (BETAPACE) 120 MG tablet TAKE 1 TABLET(120 MG) BY MOUTH TWICE DAILY 180 tablet 1     tiotropium (SPIRIVA) 18 mcg inhalation capsule Place 18 mcg into inhaler and inhale daily.       vardenafil (LEVITRA) 10 MG tablet Take 10 mg by mouth daily as needed for erectile dysfunction.       VITAMIN D2 50,000 unit capsule Take 50,000 Units by mouth 2 (two) times a week. SUN and WED  3     warfarin (COUMADIN) 2.5 MG tablet Take 2.5 mg by mouth See Admin Instructions.       No current facility-administered medications for this visit.        Physical Exam:  There were no vitals taken for this visit.  Gen: alert, oriented, no distress  HEENT: nasal turbinates are unremarkable, no oropharyngeal lesions, no cervical or supraclavicular lymphadenopathy  CV: RRR, no M/G/R  Resp: CTAB, no focal crackles or wheezes  Abd: soft, nontender, no palpable organomegaly  Skin: no apparent rashes  Ext: no cyanosis, clubbing or edema  Neuro: alert, nonfocal    Labs:  reviewed    Imaging studies:  CT chest April 2018  IMPRESSION:   CONCLUSION:  1.  Mild scarring and slight bronchiectasis present at both lung bases. No active pneumonia identified.    PFTs   FEV1/FVC is 0.56 and is reduced.  FEV1 is 73% predicted and is reduced.  FVC is 96% predicted and normal.  There was no improvement in spirometry after a single inhaled dose of bronchodilator.  TLC is 99% predicted and  is normal.  RV is 113% predicted and is normal.  DLCO is 93% predicted and is normal when it   is corrected for hemoglobin.     Impression:  Full Pulmonary Function Test is abnormal.  PFTs are consistent with mild obstructive disease.  Spirometry is not consistent with reversibility.  There is no hyperinflation.  There is no air-trapping.  Diffusion capacity when corrected for hemoglobin is normal.  Declines in both FEV1 and TLC since 2009 with overall preservation of diffusing capacity.    Hugh (Woodrow Payton MD  [facility] Pulmonary & Critical Care  Pager [phone]  Clinic [phone]

## 2021-06-17 NOTE — TELEPHONE ENCOUNTER
Telephone Encounter by Gisele Prince RN at 12/29/2020  9:44 AM     Author: Gisele Prince RN Service: -- Author Type: Registered Nurse    Filed: 12/29/2020  9:54 AM Encounter Date: 12/29/2020 Status: Addendum    : Gisele Prince RN (Registered Nurse)    Related Notes: Original Note by Gisele Prince RN (Registered Nurse) filed at 12/29/2020  9:53 AM       ANTICOAGULATION  MANAGEMENT    Assessment     Today's INR result of 3.2 is Supratherapeutic (goal INR of 2.0-3.0)        Warfarin taken as previously instructed    Decreased greens/vitamin K intake may be affecting INR due to holidays but is planning to go back to routine moving forward.    No new medication/supplements affecting INR    Continues to tolerate warfarin with no reported s/s of bleeding or thromboembolism     Previous INR was Therapeutic    Plan:     Spoke on phone with patient's spouse Neela regarding INR result and instructed:     Warfarin Dosing Instructions:  Continue current warfarin dose    5 mg every Mon, Fri; 2.5 mg all other days     (0 % change)  Neela is planning to have the patient eat greens today to help bring INR down.    Instructed patient to follow up no later than: one week with home monitor.    Education provided: importance of therapeutic range and target INR goal and significance of current INR result    Neela verbalizes understanding and agrees to warfarin dosing plan.    Instructed to call the [facility] for any changes, questions or concerns. (#[phone])   ?   Gisele Prince RN    Subjective/Objective:      Buck Sinclair, a 75 y.o. male is on warfarin.     Buck reports:     Home warfarin dose: verbally confirmed home dose with Neela and updated on anticoagulation calendar     Missed doses: No     Medication changes:  No     S/S of bleeding or thromboembolism:  No     New Injury or illness:  No     Changes in diet or alcohol consumption:  No     Upcoming surgery, procedure or cardioversion:   No    Anticoagulation Episode Summary     Current INR goal:  2.0-3.0   TTR:  95.3 % (1 y)   Next INR check:  1/5/2021   INR from last check:  3.20 (12/29/2020)   Weekly max warfarin dose:     Target end date:     INR check location:     Preferred lab:     Send INR reminders to:  [facility] HEART [facility]    Indications    Persistent Atrial Fibrillation [I48.91]           Comments:  home monitor talk to wife about dose [facility]   [phone]            Anticoagulation Care Providers     Provider Role Specialty Phone number    Erica Hansen CNP  Cardiology [phone]    Everett Renee MD  Cardiology [phone]

## 2021-06-17 NOTE — TELEPHONE ENCOUNTER
ANTICOAGULATION  MANAGEMENT-Patient Home Monitoring Result    Assessment     Therapeutic INR result of 2.0 (goal INR of 2.0-3.0) received via fax from [employer] home INR monitoring company.        Previous INR was Therapeutic    Buck Sinclair last contacted by phone: 3/16/2021    Plan     Per home monitoring agreement with patient, patient was NOT contacted regarding therapeutic result today.  Patient is to continue current dose and continue to check INR with home monitor per protocol.  ?   Gisele Prince RN    Subjective/Objective:      Buck Sinclair, a 76 y.o. male  is established on warfarin using a home INR monitor.    Anticoagulation Episode Summary     Current INR goal:  2.0-3.0   TTR:  93.5 % (1 y)   Next INR check:  5/18/2021   INR from last check:  2.00 (5/11/2021)   Weekly max warfarin dose:     Target end date:     INR check location:     Preferred lab:     Send INR reminders to:  [facility] HEART CARE    Indications    Persistent Atrial Fibrillation [I48.91]           Comments:  home monitor talk to wife about dose [facility]   [phone]            Anticoagulation Care Providers     Provider Role Specialty Phone number    Erica Hansen CNP  Cardiology [phone]    Everett Renee MD  Cardiology [phone]

## 2021-06-17 NOTE — TELEPHONE ENCOUNTER
ANTICOAGULATION  MANAGEMENT-Patient Home Monitoring Result    Assessment     Therapeutic INR result of 2.0 (goal INR of 2.0-3.0) received via fax from [employer] home INR monitoring company.        Previous INR was Therapeutic    Buck Sinclair last contacted by phone: 3/16/2021    Plan     Per home monitoring agreement with patient, patient was NOT contacted regarding therapeutic result today.  Patient is to continue current dose and continue to check INR with home monitor per protocol.  ?   Gisele Prince RN    Subjective/Objective:      Buck Sinclair, a 76 y.o. male  is established on warfarin using a home INR monitor.    Anticoagulation Episode Summary     Current INR goal:  2.0-3.0   TTR:  93.5 % (1 y)   Next INR check:  5/11/2021   INR from last check:  2.00 (5/4/2021)   Weekly max warfarin dose:     Target end date:     INR check location:     Preferred lab:     Send INR reminders to:  [facility] HEART CARE    Indications    Persistent Atrial Fibrillation [I48.91]           Comments:  home monitor talk to wife about dose [facility]   [phone]            Anticoagulation Care Providers     Provider Role Specialty Phone number    Erica Hansen CNP  Cardiology [phone]    Everett Renee MD  Cardiology [phone]

## 2021-06-17 NOTE — TELEPHONE ENCOUNTER
Central PA team  [phone]  Pool: HE PA MED ([id number])          PA has been initiated.       PA form completed and faxed insurance via Cover My Meds     Key:  [id number]      Medication:  SOTALOL 160MG    Insurance:  HEALTH [employer]        Response will be received via fax and may take up to 5-10 business days depending on plan

## 2021-06-18 NOTE — PROGRESS NOTES
INR 3.2 pt has increase beer and less greens this last weekend. Will increase salads and retest in one week. After talking with pt and discussing history of greens/salads and medication change. Pt will  continue  with current diet and dosing of Warfarin.  Continue with moderation of Vit K and green leafy vegetables. Cautioned to call with increase bruising or bleeding. Reminded to call with medication change especially antibiotic. Call with any questions or concerns or any up coming procedures. Cautioned about using Herbal medication.

## 2021-06-18 NOTE — PROGRESS NOTES
ECG after cardioversion showed ST-T wave changes and QT seem to long.  We will follow closely; not quite as concerned about his QT since he will not have bradycardia and does have an ICD  Sotalol was increased to 160 mg BID

## 2021-06-18 NOTE — PROGRESS NOTES
INR 2.3 will continue 5 mg Sun and wed and 2.5 mg all other days. After talking with pt and discussing history of greens/salads and medication change. Pt will  continue  with current diet and dosing of Warfarin.  Continue with moderation of Vit K and green leafy vegetables. Cautioned to call with increase bruising or bleeding. Reminded to call with medication change especially antibiotic. Call with any questions or concerns or any up coming procedures. Cautioned about using Herbal medication.  Spoke with wife.

## 2021-06-18 NOTE — PROGRESS NOTES
Successful cardioversion May 22  Back in atrial fibrillation May 23 plan stop sotalol  Start Toprol 50 mg daily  Adjust pacemaker system to minimize ventricular pacing: Would reprogram to VVI equals 50  Anticipate accepting atrial fibrillation and pursuing  rate control strategy  We will need to determine amount of beta-blockers to control heart rate without too much ventricular pacing  Follow-up with me in less than 1 month with  [employer] rep

## 2021-06-18 NOTE — PROGRESS NOTES
1945  Home:[phone] (home) Cell:[phone] (mobile)  Emergency Contact: Neela Sinclair [phone]     Component INR   Latest Ref Rng & Units 0.9 - 1.1   3/6/2018 2.70 (A)   3/13/2018 3.10 (A)   3/20/2018 2.10 (A)   4/3/2018 2.40 (A)   4/10/2018 2.30 (A)   4/17/2018 2.60 (A)   4/24/2018 2.30 (A)   5/1/2018 2.70 (A)     5/15/18- 2.8    +++Important patient information for [facility] [place] [place]/Cath Lab staff : None+++    [facility] EP Cath Lab Procedure Order   Cardioversion:  Cardioversion    Diagnosis:  AF  Anticipated Case Duration:  Standard  Scheduling Needs/Timeframe:  Next Available    Current Device: Dual ICD  Device Company/Device Rep Needed for Procedure: [employer]    Anesthesia:  General-CV Only  Research Protocol:  No    [facility] EP Cath Lab Prep   Ordering Provider: Dr Renee  Ordering Date: 5/16/2018  Orders Status: Intial order placed and Order set placed  EP NC Contact: Marta Angeles RN    H&P:  Pt to schedule with PMD to complete. 5/17/18 PMD scanned into EPIC.  PCP: Vivek Guerrero MD, [phone]    Pre-op Labs: Ordered AM of procedure    Medical Records Pertinent for Procedure:  KANDICE 12/4/17 and EKG 10/4/17    Patient Education:    Teach with Patient: Will be completed via phone prior to procedure, and letter was also sent to pt via mail/WebNotes with written pre-procedural instructions. With pt via phone on 5/16/18.    Risks Reviewed:     Cardioversion    >90% acute success rate, <10% failure to convert or   reverts shortly after cardioversion.    <1% embolic event of (CVA, pulmonary embolism, or   other site).    75% risk for superficial burn.  Risks associated with general anesthesia will be addressed by the Anesthesiology Department    Consent: Will be obtained in [facility] [place] [place] day of procedure    Pre-Procedure Instructions that were Reviewed with Patient:  NPO after midnight, Remove all jewelry prior to coming in for procedure, Shower prior to arrival, Notified patient of time and date of procedure by CV ,  Transportation arrangements needed s/p procedure, Post-procedure follow up process, Sedation plan/orders and Pre-procedure letter was sent to pt by CV     Pre-Procedure Medication Instructions:  Instructions given to pt regarding anticoagulants: Coumadin- instructed to continue anticoagulation uninterrupted through their procedure  Instructions given to pt regarding antiarrhythmic medication: Sotalol; Pt instructed to continue medication prior to procedure  Instructions for medication, other than anticoagulants/antiarrhythmics listed above, given to pt: to take all morning medications with small sips of water, with the exception of OTC supplements and MVI    Allergies   Allergen Reactions     Adhesive Other (See Comments)     ADHESIVE TAPE; SKIN IRRITATION     Amiodarone      ADVERSE REACTION.  Sunlight sensitivity.     Lisinopril Cough       Current Outpatient Prescriptions:      ACETAMINOPHEN (TYLENOL ORAL), Take 1,000 mg by mouth 2 (two) times a day. , Disp: , Rfl:      albuterol (PROAIR HFA;PROVENTIL HFA;VENTOLIN HFA) 90 mcg/actuation inhaler, Inhale 2 puffs every 6 (six) hours as needed for wheezing., Disp: 1 Inhaler, Rfl: 11     ascorbic acid (VITAMIN C) 1000 MG tablet, Take 1,000 mg by mouth daily., Disp: , Rfl:      atorvastatin (LIPITOR) 40 MG tablet, Take 40 mg by mouth daily., Disp: , Rfl:      budesonide-formoterol (SYMBICORT) 160-4.5 mcg/actuation inhaler, Inhale 2 puffs 2 (two) times a day., Disp: , Rfl:      clopidogrel (PLAVIX) 75 mg tablet, Take 1 tablet (75 mg total) by mouth daily., Disp: 30 tablet, Rfl: 11     co-enzyme Q-10 30 mg capsule, Take 100 mg by mouth daily., Disp: , Rfl:      diltiazem (CARTIA XT) 240 MG 24 hr capsule, Take 1 capsule (240 mg total) by mouth daily., Disp: 90 capsule, Rfl: 1     furosemide (LASIX) 20 MG tablet, TAKE 1 TABLET(20 MG) BY MOUTH DAILY, Disp: 90 tablet, Rfl: 1     losartan (COZAAR) 50 MG tablet, Take 1 tablet (50 mg total) by mouth daily., Disp: 90  tablet, Rfl: 5     MAG 64 64 mg TbEC delayed-release tablet, TAKE 1 TABLET BY MOUTH TWICE DAILY, Disp: 60 tablet, Rfl: 0     multivitamin (MULTIVITAMIN) per tablet, Take 1 tablet by mouth daily., Disp: , Rfl:      omega 3-dha-epa-fish oil (FISH OIL) 1,000 mg (120 mg-180 mg) cap, Take 2 tablets by mouth 2 (two) times a day. , Disp: , Rfl:      polyethylene glycol (MIRALAX) 17 gram packet, Take 17 g by mouth daily., Disp: , Rfl:      sotalol (BETAPACE) 120 MG tablet, Take 1 tablet (120 mg total) by mouth 2 (two) times a day., Disp: 180 tablet, Rfl: 1     tiotropium (SPIRIVA) 18 mcg inhalation capsule, Place 18 mcg into inhaler and inhale daily., Disp: , Rfl:      vardenafil (LEVITRA) 10 MG tablet, Take 10 mg by mouth daily as needed for erectile dysfunction., Disp: , Rfl:      VITAMIN D2 50,000 unit capsule, Take 50,000 Units by mouth 2 (two) times a week. SUN and WED, Disp: , Rfl: 3     warfarin (COUMADIN) 2.5 MG tablet, Take 5 mg M,W,F and 2.5 mg all other days., Disp: 90 tablet, Rfl: 0    Documentation Date:5/16/2018 9:18 AM  Val Angeles RN

## 2021-06-18 NOTE — PROGRESS NOTES
Patient seen in clinic, accompanied by wife, for QTc ck after starting Sotalol 160mg two times a day 6-16-18 - confirmed patient stopped Cartia and Metoprolol Succ - EKG shows AF w/CVR @ 66 - QTc 465ms - BP 98/70 - patient stated sx of shortness of breath, dizziness, fatigue persist - patient verbalized frustration that pacer can't be programmed to detect when he goes back in SR which was missed recently - reviewed Yohannes's documented plan from 6-12-18 including possible DCCV if AF persists on 6-25-18 device ck.    Informed patient that EKG would be reviewed by Yohannes and he would be contacted with response/recommendations once available - patient stated they are leaving for cabin at 1400 and requested call on cell # [phone], if return call occurs after 1700, contact # for [employer] [phone] - confirmed f/u with Dr. Renee 7-6-18.    Please review EKG results and patient update - any new orders prior to 6-25-18 device ck?   mg

## 2021-06-18 NOTE — PROGRESS NOTES
INR 3.1 at home. Will continue on current dose and retest in one week. After talking with pt and discussing history of greens/salads and medication change. Pt will  continue  with current diet and dosing of Warfarin.  Continue with moderation of Vit K and green leafy vegetables. Cautioned to call with increase bruising or bleeding. Reminded to call with medication change especially antibiotic. Call with any questions or concerns or any up coming procedures. Cautioned about using Herbal medication.

## 2021-06-18 NOTE — PROGRESS NOTES
Please see Yohannes 's note, and EKG follow up from yesterday, CV ordered and pt does have a device  Pt is being followed for CV that is pre booked for 7/11 at pt's request  Pt left for the cabin yesterday 0 , and will not be back and does not want to come back  until 7/5, and will see GAG 7/6  Pt did not want CV till after  Pt is still to do a remote 6/25 as Yohannes requested  Pt is aware I will do teach on 7/9 unless something changes from GAG visit  Pt understands and agrees to plan.

## 2021-06-18 NOTE — LETTER
Letter by Monique Lin at      Author: Monique Lin Service: -- Author Type: --    Filed:  Encounter Date: 1/30/2019 Status: (Other)       Buck JONES Yahir  [address] [place] [address] [address] [address]      January 30, 2019      Dear Mr. Sinclair,    RE: Remote Results    We are writing to you regarding your recent Remote ICD check from home. Your transmission was received successfully. Battery status is satisfactory at this time.     Your results are within normal limits.    Your next device appointment will be a remote check on May 2, 2019; this will occur automatically.    To schedule or reschedule, please call [phone] and press 1.    NOTE: If you would like to do an extra transmission, please call [phone] and press 3 to speak to a nurse BEFORE transmitting. This ensures that the Device Clinic staff is aware of the reason you are sending a transmission, and can follow-up with you after it has been reviewed.    We will be checking your implanted device from home (remotely) every three months unless otherwise instructed. We will need to see you in the clinic at least once a year. You may need to be seen in the clinic sooner depending on the results of your check.    Please be aware:    The follow-up schedule is like a Physician prescription.    Your remote monitor is paired to your specific implanted device.      Sincerely,    [facility]

## 2021-06-18 NOTE — PROGRESS NOTES
INR 3.1 will adjust dose to 5 mg Sun and wed and 2.5 mg all other days. Wife reports pt is bruising. Retest on 6/12.

## 2021-06-18 NOTE — PROGRESS NOTES
INR 3.0 at home. Continue current management dosing of Warfarin. Continue  diet of moderate Vitamin K intake. Discussed with pt the need to call with questions or concerns or any change in medication especially herbal medication or OTC. Call with increased bleeding or bruising or any upcoming procedures.

## 2021-06-18 NOTE — PROGRESS NOTES
Alert received for Pablo last Thursday, episode of AF started on the evening of 5/9/18. I spoke with patient last week and at that time he was feeling well we had discussed seeing how it goes over weekend and will check this week.      I had him send in a repeat remote today which shows Pablo is still in progress since 5/9/18 with great rate control. V-rate rarely >100 bpm. V-pacing has increased significantly since change in rhythm from 4.2% to 93.8%. AF burden graph shows no persistent AF since May/Jessica of 2017.     He confirms he is compliant with Warfarin, INRs all >2 in last couple months. Also is taking Betapace 120 mg twice daily and Cartia  mg daily. He states over time with Pablo he becomes more and more short of breath and tired. He reports an increase in both of these over the weekend. Denies any troubles breathing at rest, just notices going up stairs is a bit more difficult and feels tired. OptiVol is good.    Device reports copied below. Will review with Dr. Renee at this time.     I had a full telephone conversation with Salvador this morning     more than 20 cardioversions  Atrial fibrillation present since 5-  Quite symptomatic during AF  Maybe from AF or from high %  he has a lot of ventricular pacing equals 93.5%  Ejection fraction has been about 50%  Plan  Schedule him for a cardioversion with me-try to get that done this week if possible  We will need device rep present

## 2021-06-18 NOTE — PROGRESS NOTES
Phone call to patient's wife Neela - informed her of Yohannes's response and that her nurse would contact them next wk if DCCV needs to be scheduled after device ck results reviewed - understanding verbalized and requested they be contacted at cabin # provided earlier.    Message sent to Yohannes's nurse Palma with contact #.     mg

## 2021-06-18 NOTE — ANESTHESIA CARE TRANSFER NOTE
Last vitals:   Vitals:    05/22/18 1337   BP: (!) 165/104   Pulse: 72   Resp: 10   Temp:    SpO2: 93%     Patient's level of consciousness is drowsy  Spontaneous respirations: yes  Maintains airway independently: yes  Dentition unchanged: yes  Oropharynx: oropharynx clear of all foreign objects    QCDR Measures:  ASA# 20 - Surgical Safety Checklist: WHO surgical safety checklist completed prior to induction  PQRS# 430 - Adult PONV Prevention: NA - Not adult patient, not GA or 3 or more risk factors NOT present  ASA# 8 - Peds PONV Prevention: NA - Not pediatric patient, not GA or 2 or more risk factors NOT present  PQRS# 424 - Geovanna-op Temp Management: NA - MAC anesthesia or case < 60 minutes  PQRS# 426 - PACU Transfer Protocol: - Transfer of care checklist used  ASA# 14 - Acute Post-op Pain: ASA14B - Patient did NOT experience pain >= 7 out of 10

## 2021-06-18 NOTE — ANESTHESIA POSTPROCEDURE EVALUATION
Patient: Buck Sinclair  * No procedures listed *  Anesthesia type: general    Patient location: [facility] [place]  Last vitals:   Vitals:    05/22/18 1350   BP: 133/76   Pulse: 72   Resp: 15   Temp:    SpO2: 98%     Post vital signs: stable  Level of consciousness: awake and responds to simple questions  Post-anesthesia pain: pain controlled  Post-anesthesia nausea and vomiting: no  Pulmonary: unassisted, return to baseline  Cardiovascular: stable and blood pressure at baseline  Hydration: adequate  Anesthetic events: no    QCDR Measures:  ASA# 11 - Geovanna-op Cardiac Arrest: ASA11B - Patient did NOT experience unanticipated cardiac arrest  ASA# 12 - Geovanna-op Mortality Rate: ASA12B - Patient did NOT die  ASA# 13 - PACU Re-Intubation Rate: NA - No ETT / LMA used for case  ASA# 10 - Composite Anes Safety: ASA10A - No serious adverse event    Additional Notes:

## 2021-06-18 NOTE — PROGRESS NOTES
In clinic device check with follow up with Yohannes Hansen CNP.  Please see link for full device report.  Patient was informed of results and next follow up during today's visit.

## 2021-06-18 NOTE — PROGRESS NOTES
I believe the last cardioversion lasted less than 24 hours  If they want to try high-dose sotalol and repeat cardioversion that would be fine but I am not very optimistic that we can k            Tyrone---I am happy to discuss case or ok to message me if you are ok with another trial of sinus.   Monique-see HPI of note and message me back about patient question of another study or ok if you want to call him. ( Please chart phone call and send to me then.)      eep him out of atrial fibrillation

## 2021-06-18 NOTE — ANESTHESIA PREPROCEDURE EVALUATION
Anesthesia Evaluation      Patient summary reviewed   No history of anesthetic complications     Airway   Mallampati: II  Neck ROM: full   Pulmonary - normal exam   (+) pneumonia, COPD moderate, a smoker                         Cardiovascular   (+) hypertension, CAD, dysrhythmias, cardiomyopathy, hypercholesterolemia,     ECG reviewed  Rhythm: irregular  Rate: abnormal,         Neuro/Psych - negative ROS     Endo/Other    (+) obesity,      GI/Hepatic/Renal - negative ROS      Other findings: Dyslipidemia, goal LDL below 70     Essential hypertension    Persistent Atrial Fibrillation    History of sick sinus syndrome    ICD (implantable cardioverter-defibrillator), dual, in situ    Status post catheter ablation of atrial fibrillation    Coronary artery disease due to calcified coronary lesion    COPD, group D, by GOLD 2017 classification          Dental - normal exam                        Anesthesia Plan  Planned anesthetic: general mask    ASA 3   Induction: intravenous   Anesthetic plan and risks discussed with: patient and spouse    Post-op plan: routine recovery

## 2021-06-19 NOTE — ANESTHESIA POSTPROCEDURE EVALUATION
Patient: Buck Sinclair  * No procedures listed *  Anesthesia type: general    [place] [facility] [place] vitals:   Vitals:    07/11/18 1344   BP: 134/90   Pulse: 61   Resp: 16   Temp:    SpO2: 95%     Post vital signs: stable  Level of consciousness: awake and responds to simple questions  Post-anesthesia pain: pain controlled  Post-anesthesia nausea and vomiting: no  Pulmonary: unassisted, return to baseline  Cardiovascular: stable and blood pressure at baseline  Hydration: adequate  Anesthetic events: no    QCDR Measures:  ASA# 11 - Geovanna-op Cardiac Arrest: ASA11B - Patient did NOT experience unanticipated cardiac arrest  ASA# 12 - Geovanna-op Mortality Rate: ASA12B - Patient did NOT die  ASA# 13 - PACU Re-Intubation Rate: NA - No ETT / LMA used for case  ASA# 10 - Composite Anes Safety: ASA10A - No serious adverse event    Additional Notes:

## 2021-06-19 NOTE — LETTER
Letter by Hugh Payton MD at      Author: Hugh Payton MD Service: -- Author Type: --    Filed:  Encounter Date: 4/11/2019 Status: (Other)         Vivek Guerrero MD  [address] [place] [id number]                                  April 11, 2019    Patient: Buck Sinclair   MR Number: [id number]   YOB: 1945   Date of Visit: 4/11/2019     Dear Dr. Cesar MD:    Thank you for referring Buck Sinclair to me for evaluation. Below are the relevant portions of my assessment and plan of care.    If you have questions, please do not hesitate to call me. I look forward to following Buck along with you.    Sincerely,        Hugh Payton MD          CC  No Recipients  Hugh Payton MD  4/11/2019  1:41 PM  Sign at close encounter  Pulmonary Clinic Follow-up Visit    Assessment/Plan:  72 year old male with a history of tobacco dependence in remission, CAD s/p PCI/stents, afib s/p PVL with ICD/PPM on anticoagulation, history of cavitary lesion and extensive pneumonia in LLL in Oct 2017, subsequent recurrent LLL pneumonia, since resolved, presenting for follow up. He had some recurrent hemoptysis a few ago but this seems to have resolved. His dyspnea on exertion and exercise tolerance are stable. No recent hospitalizations or ER visits. Lung exam, vitals reassuring today. We reviewed his prior imaging and he does have some bronchiectasis and scarring in the LLL which may be prone to bleeding, especially given his anticoagulation status.    Plan:  #Hemoptysis, recurrent:   - CT chest with IV contrast  - OK to cont coumadin for now; pending above result may need to discuss changing INR goal vs. Alternative anticoagulatants (e.g. ASA)  - He knows to call or go to the ER if worsening hemoptysis, shortness of breath    #COPD, GOLD class B (CAT >10, few exacerbations/low risk for hospitalization). Minimal smoking history so this is probably due to his work as a   - continue  budesonide-formoterol 160-4.5 mcg two inhalations BID WITH SPACER; rinse/gargle/spit water after use. No changes today but will consider tapering at next visit.   - continue tiotropium HandiHaler one inhalation daily  - Cont albuterol as needed  - encouraged exercise, weight loss as able  - UTD w/ pneumonia and flu vaccines.    Follow up in 3 months. I will call him with CT scan results. He He will call in the interim if he has any questions/concerns.    CCx: follow up of hemoptysis, and COPD GOLD class B    HPI: Interim history: I last saw Buck on 10/25/2018. Since that time, he's been having some hemoptysis. It was really bad a few weeks ago, he was coughing up some bright red blood a few days in a row. It was not as bad as last Fall when he had to go to the ER. He reports stable dyspnea on exertion since our last visit. Taking symbicort and spiriva faithfully. Rare use of rescue inhaler.   Completed pulmonary rehab last Fall. He was tested for exertional hypoxemia and says he did not desaturate with activity.  He doesn't exercise much, gets winded with one flight of stairs. 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  No chest infections, exacerbations or ER visits/hospitalizations since I last saw him.  He is on coumadin. No issues with INR being too high or too low.  Negative PSG for MAYTE in 2015.     ROS:  A 12-system review was obtained and was negative with the exception of the symptoms endorsed in the history of present illness.    PMH:  Past Medical History:   Diagnosis Date   ? Anemia    ? BPH (benign prostatic hyperplasia)    ? COPD (chronic obstructive pulmonary disease) (H)    ? Coronary artery disease due to calcified coronary lesion    ? Dyslipidemia, goal LDL below 70    ? Essential hypertension    ? History of cardiomyopathy    ? History of transfusion    ? Persistent atrial fibrillation (H)    ? Pneumonia of left lower lobe due to infectious organism (H)  10/4/2017   ? Skin cancer of trunk    ? Status post catheter ablation of atrial fibrillation 6/7/2017    PVI 4-2011 (Cryo/PVI + roof line + CTI line) Re-do PVI 7-2011 (RFA/PVI + CFE + VIDYA + confirmed CTI line)   ? Ventricular tachycardia (H)        PSH:  Past Surgical History:   Procedure Laterality Date   ? CARDIAC DEFIBRILLATOR PLACEMENT     ? CARDIOVERSION  07/11/2018    x20, last 2/12/15, 10/2015, 11/18/16, 6/16/17 by Lauren Foster CNP   ? CARDIOVERSION  07/11/2018   ? COLONOSCOPY N/A 4/28/2017    Procedure: COLONOSCOPY with 2 ascending polyps and 1 transverse polyp;  Surgeon: Jose Whittington MD;  Location: [facility] GI;  Service:    ? CV CORONARY ANGIOGRAM N/A 9/20/2017    Procedure: Coronary Angiogram;  Surgeon: Sergio Cervantes MD;  Location: [facility] Lab;  Service:    ? EP ICD INSERT     ? FRACTURE SURGERY Left     wrist   ? INGUINAL HERNIA REPAIR Left 1967    while in the [employer] in [employer] after 13 month in [place]   ? INSERT / REPLACE / REMOVE PACEMAKER     ? left hand surgery---tendon repair     ? ID ABLATE HEART DYSRHYTHM FOCUS  04/2011    Catheter Ablation Atrial Fibrillation PVI Apr 2011 (Cryo+RF-PVI + roof line + CTI line)   ? ID ABLATE HEART DYSRHYTHM FOCUS  07/2011    Re-do PVI Jul 2011 (RFA-PVI + CFE + VIDYA + confirmation of CTI line)   ? ID MYERS W/O FACETEC FORAMOT/DSKC 1/2 VRT SEG, CERVICAL      Laminectomy Lumbar;  Recorded: 03/09/2012;   ? TOTAL SHOULDER REPLACEMENT Right 03/03/2016    Dr. Abernathy of [facility]       Allergies:  Allergies   Allergen Reactions   ? Adhesive Other (See Comments)     ADHESIVE TAPE; SKIN IRRITATION  Foam tape:  Skin removed with tape removal   ? Amiodarone      ADVERSE REACTION.  Sunlight sensitivity.   ? Lisinopril Cough       Family HX:  Family History   Problem Relation Age of Onset   ? Cancer Mother         leukemia   ? Cancer Father         bladder   ? Cancer Sister         breast with lung met.   ? Aneurysm Sister    ? CABG Brother    ?  CABG Brother    ? Valvular heart disease Brother         valve replacement       Social Hx:  Social History     Socioeconomic History   ? Marital status:      Spouse name: Neela   ? Number of children: Not on file   ? Years of education: 12   ? Highest education level: Not on file   Occupational History   ? Occupation:      Employer: RETIRED   ? Occupation: [employer]     Comment: [employer].   Social Needs   ? Financial resource strain: Not on file   ? Food insecurity:     Worry: Not on file     Inability: Not on file   ? Transportation needs:     Medical: Not on file     Non-medical: Not on file   Tobacco Use   ? Smoking status: Former Smoker     Packs/day: 1.00     Years: 4.00     Pack years: 4.00     Types: Cigarettes     Last attempt to quit: 1968     Years since quittin.3   ? Smokeless tobacco: Never Used   Substance and Sexual Activity   ? Alcohol use: Yes     Alcohol/week: 1.2 oz     Types: 2 Cans of beer per week     Comment: 1 beer per week   ? Drug use: No   ? Sexual activity: Yes     Partners: Female     Birth control/protection: Post-menopausal   Lifestyle   ? Physical activity:     Days per week: Not on file     Minutes per session: Not on file   ? Stress: Not on file   Relationships   ? Social connections:     Talks on phone: Not on file     Gets together: Not on file     Attends Shinto service: Not on file     Active member of club or organization: Not on file     Attends meetings of clubs or organizations: Not on file     Relationship status: Not on file   ? Intimate partner violence:     Fear of current or ex partner: Not on file     Emotionally abused: Not on file     Physically abused: Not on file     Forced sexual activity: Not on file   Other Topics Concern   ? Not on file   Social History Narrative   ? Not on file       Current Meds:  Current Outpatient Medications   Medication Sig Dispense Refill   ? ACETAMINOPHEN (TYLENOL ORAL) Take 1,000 mg by mouth 2 (two)  "times a day.      ? amoxicillin (AMOXIL) 500 MG tablet Take prior to the Dentist     ? ascorbic acid (VITAMIN C) 1000 MG tablet Take 1,000 mg by mouth daily.     ? aspirin 81 MG EC tablet Take 1 tablet (81 mg total) by mouth daily. 30 tablet 11   ? atorvastatin (LIPITOR) 40 MG tablet Take 40 mg by mouth at bedtime.     ? budesonide-formoterol (SYMBICORT) 160-4.5 mcg/actuation inhaler Inhale 2 puffs 2 (two) times a day.     ? co-enzyme Q-10 30 mg capsule Take 100 mg by mouth daily.     ? furosemide (LASIX) 20 MG tablet TAKE 1 TABLET(20 MG) BY MOUTH DAILY 90 tablet 3   ? losartan (COZAAR) 50 MG tablet TAKE 1 TABLET BY MOUTH EVERY DAY 90 tablet 2   ? magnesium chloride (MAG 64) 64 mg TbEC delayed-release tablet Take 1 tablet (64 mg total) by mouth 2 (two) times a day. 180 tablet 5   ? multivitamin (MULTIVITAMIN) per tablet Take 1 tablet by mouth daily.     ? omega 3-dha-epa-fish oil (FISH OIL) 1,000 mg (120 mg-180 mg) cap Take 2 tablets by mouth 2 (two) times a day.      ? polyethylene glycol (MIRALAX) 17 gram packet Take 17 g by mouth daily.     ? sotalol (BETAPACE) 80 MG tablet Take 160 mg by mouth 2 (two) times a day.  5   ? tiotropium (SPIRIVA) 18 mcg inhalation capsule Place 18 mcg into inhaler and inhale daily.     ? vardenafil (LEVITRA) 10 MG tablet Take 10 mg by mouth daily as needed for erectile dysfunction.     ? VITAMIN D2 50,000 unit capsule Take 50,000 Units by mouth 2 (two) times a week. SUN and WED  3   ? warfarin (COUMADIN/JANTOVEN) 2.5 MG tablet TAKE 5 MG TABLETS BY MOUTH M W F AND 2.5 MG TABLETS ALL OTHER DAYS. 90 tablet 0   ? warfarin (COUMADIN/JANTOVEN) 5 MG tablet Take as directed  0   ? albuterol (PROAIR HFA;PROVENTIL HFA;VENTOLIN HFA) 90 mcg/actuation inhaler Inhale 2 puffs every 6 (six) hours as needed for wheezing. 1 Inhaler 11     No current facility-administered medications for this visit.        Physical Exam:  /60   Pulse 77   Ht 6' 3\" (1.905 m)   Wt (!) 282 lb (127.9 kg)   SpO2 " 92%   BMI 35.25 kg/m     Gen: alert, oriented, no distress  HEENT: nasal turbinates are unremarkable, no oropharyngeal lesions, no cervical or supraclavicular lymphadenopathy  CV: RRR, no M/G/R  Resp: CTAB, no focal crackles or wheezes  Abd: soft, nontender, no palpable organomegaly  Skin: no apparent rashes  Ext: no cyanosis, clubbing or edema  Neuro: alert, nonfocal    Labs:  Reviewed  Dec 2018  Chem panel wnl  TSH wnl  hgb 12.1 Oct 2018    Previous testing  DONNA neg  blasto neg  Histo testing neg  c-ANCA neg  HIV neg  RF neg    Bronch/LLL BAL cultures neg      Imaging studies:  CT chest April 2018  IMPRESSION:   CONCLUSION:  1.  Mild scarring and slight bronchiectasis present at both lung bases. No active pneumonia identified.    PFTs   FEV1/FVC is 0.56 and is reduced.  FEV1 is 73% predicted and is reduced.  FVC is 96% predicted and normal.  There was no improvement in spirometry after a single inhaled dose of bronchodilator.  TLC is 99% predicted and is normal.  RV is 113% predicted and is normal.  DLCO is 93% predicted and is normal when it   is corrected for hemoglobin.     Impression:  Full Pulmonary Function Test is abnormal.  PFTs are consistent with mild obstructive disease.  Spirometry is not consistent with reversibility.  There is no hyperinflation.  There is no air-trapping.  Diffusion capacity when corrected for hemoglobin is normal.  Declines in both FEV1 and TLC since 2009 with overall preservation of diffusing capacity.    Hugh Payton MD (Avi)  [facility]  Pager [phone]  Clinic [phone]

## 2021-06-19 NOTE — ANESTHESIA CARE TRANSFER NOTE
Last vitals:   Vitals:    07/11/18 1337   BP: (!) 143/102   Pulse: 66   Resp: 14   Temp:    SpO2: 98%     Patient's level of consciousness is drowsy  Spontaneous respirations: yes  Maintains airway independently: yes  Dentition unchanged: yes  Oropharynx: oropharynx clear of all foreign objects    QCDR Measures:  ASA# 20 - Surgical Safety Checklist: WHO surgical safety checklist completed prior to induction  PQRS# 430 - Adult PONV Prevention: NA - Not adult patient, not GA or 3 or more risk factors NOT present  ASA# 8 - Peds PONV Prevention: NA - Not pediatric patient, not GA or 2 or more risk factors NOT present  PQRS# 424 - Geovanna-op Temp Management: NA - MAC anesthesia or case < 60 minutes  PQRS# 426 - PACU Transfer Protocol: - Transfer of care checklist used  ASA# 14 - Acute Post-op Pain: ASA14B - Patient did NOT experience pain >= 7 out of 10

## 2021-06-19 NOTE — PROGRESS NOTES
1945  Home:[phone] (home) Cell:[phone] (mobile)  Emergency Contact: Neela Sinclair [phone]    +++Important patient information for CSC/Cath Lab staff : PT IS ON SOTALOL, PT HAS [employer] PACEMAKER+++    [facility] EP Cath Lab Procedure Order     Cardioversion:    Cardioversion     PT DOES HOME MONITORING FOR INR'S COPY IN EPIC UNDER MEDIA    7/3=2.1  6/26=2.3  6/19=2  6/12=2.3  6/5=3.1  5/29=3.2  5/22=3.2    Diagnosis:  AF  Anticipated Case Duration:  Standard  Scheduling Needs/Timeframe:  PT IS SET FOR WED 7/11/2018 AT 11 30 WITH YOHANNES HANSEN CNP    Current Device: Dual Pacemaker  Device Company/Device Rep Needed for Procedure: [employer]    Anesthesia:  General-CV Only  Research Protocol:  No    [facility] EP Cath Lab Prep   Ordering Provider: Yohannes Hansen NP  Ordering Date: 6/13/2018  Orders Status: Intial order placed and Order set placed  EP NC Contact: Palma Vega LPN    H&P:  Compled by YOHANNES HANSEN CNP on 6/13/2018 if scheduled within 30 days, pt to schedule with PMD if procedure outside of this timeframe  PCP: Vivek Guerrero MD, [phone]    Pre-op Labs: N/A for procedure    Medical Records Pertinent for Procedure:  N/A    Patient Education:    PT HAS A  FOR PROCEDURE WHO WILL BE WITH HIM FOR PROCEDURE AND AWARE SOMEONE TO BE WITH HIM 24 HRS. AFTER PROCEDURE  PT INSTRUCTED TO HOLD ANY VITAMINS, MINERALS, CALCIUM, IRON OR SUPPLEMENTS THE MORNING OF CV  PT INSTRUCTED TO BATHE OR SHOWER BEFORE COMING IN  PT INSTRUCTED TO LEAVE JEWELRY AT HOME  PT HAS [employer] PACEMAKER  PT DOSE HOME MONITORING FOR INR'S  PT IS ON SOTALOL  PT TO HAVE FOLLOW UP WITH YOHANNES 4 WEEKS AFTER CV        Teach with Patient: Completed via phone on 7/9/2018    Risks Reviewed:     Cardioversion    >90% acute success rate, <10% failure to convert or   reverts shortly after cardioversion.    <1% embolic event of (CVA, pulmonary embolism, or   other site).    75% risk for superficial burn.  Risks associated with general  anesthesia will be addressed by the Anesthesiology Department    Consent: Will be obtained in CSC day of procedure    Pre-Procedure Instructions that were Reviewed with Patient:  NPO after midnight, Remove all jewelry prior to coming in for procedure, Shower prior to arrival, Transportation arrangements needed s/p procedure, Post-procedure follow up process and Sedation plan/orders    Pre-Procedure Medication Instructions:  Instructions given to pt regarding anticoagulants: Coumadin- instructed to continue anticoagulation uninterrupted through their procedure  Instructions given to pt regarding antiarrhythmic medication: Sotalol; Pt instructed to continue medication prior to procedure  Instructions for medication, other than anticoagulants/antiarrhythmics listed above, given to pt: to take all morning medications with small sips of water, with the exception of OTC supplements and MVI    Allergies   Allergen Reactions     Adhesive Other (See Comments)     ADHESIVE TAPE; SKIN IRRITATION     Amiodarone      ADVERSE REACTION.  Sunlight sensitivity.     Lisinopril Cough       Current Outpatient Prescriptions:      ACETAMINOPHEN (TYLENOL ORAL), Take 1,000 mg by mouth 2 (two) times a day. , Disp: , Rfl:      albuterol (PROAIR HFA;PROVENTIL HFA;VENTOLIN HFA) 90 mcg/actuation inhaler, Inhale 2 puffs every 6 (six) hours as needed for wheezing., Disp: 1 Inhaler, Rfl: 11     amoxicillin (AMOXIL) 500 MG tablet, Take prior to the Dentist, Disp: , Rfl:      ascorbic acid (VITAMIN C) 1000 MG tablet, Take 1,000 mg by mouth daily., Disp: , Rfl:      atorvastatin (LIPITOR) 40 MG tablet, Take 40 mg by mouth at bedtime., Disp: , Rfl:      budesonide-formoterol (SYMBICORT) 160-4.5 mcg/actuation inhaler, Inhale 2 puffs 2 (two) times a day., Disp: , Rfl:      clopidogrel (PLAVIX) 75 mg tablet, Take 1 tablet (75 mg total) by mouth daily., Disp: 30 tablet, Rfl: 11     co-enzyme Q-10 30 mg capsule, Take 100 mg by mouth daily., Disp: , Rfl:       furosemide (LASIX) 20 MG tablet, TAKE 1 TABLET(20 MG) BY MOUTH DAILY, Disp: 90 tablet, Rfl: 1     losartan (COZAAR) 50 MG tablet, Take 1 tablet (50 mg total) by mouth daily., Disp: 90 tablet, Rfl: 1     magnesium chloride (MAG 64) 64 mg TbEC delayed-release tablet, Take 1 tablet (64 mg total) by mouth 2 (two) times a day., Disp: 180 tablet, Rfl: 5     multivitamin (MULTIVITAMIN) per tablet, Take 1 tablet by mouth daily., Disp: , Rfl:      omega 3-dha-epa-fish oil (FISH OIL) 1,000 mg (120 mg-180 mg) cap, Take 2 tablets by mouth 2 (two) times a day. , Disp: , Rfl:      polyethylene glycol (MIRALAX) 17 gram packet, Take 17 g by mouth daily., Disp: , Rfl:      sotalol (BETAPACE) 160 MG tablet, Take 1 tablet (160 mg total) by mouth every 12 (twelve) hours., Disp: , Rfl: 0     tiotropium (SPIRIVA) 18 mcg inhalation capsule, Place 18 mcg into inhaler and inhale daily., Disp: , Rfl:      vardenafil (LEVITRA) 10 MG tablet, Take 10 mg by mouth daily as needed for erectile dysfunction., Disp: , Rfl:      VITAMIN D2 50,000 unit capsule, Take 50,000 Units by mouth 2 (two) times a week. SUN and WED, Disp: , Rfl: 3     warfarin (COUMADIN) 2.5 MG tablet, Take 5 mg M,W,F and 2.5 mg all other days., Disp: 90 tablet, Rfl: 0    Documentation Date:7/9/2018 9:45 AM  Alan Vega LPN

## 2021-06-19 NOTE — LETTER
Letter by Cande Hernandez EPS at      Author: Cande Hernandez EPS Service: -- Author Type: --    Filed:  Encounter Date: 6/11/2019 Status: (Other)         Buck GOLDMAN Yahir  [address] [place] [address] [address] [place] [address]      June 11, 2019      Dear Mr. Sinclair,    RE: Remote Results    We are writing to you regarding your recent Remote ICD check from home. Your transmission was received successfully. Battery status is satisfactory at this time.     Your results are within normal limits.    Your next device appointment will be a remote check on September 12th, 2019; this will occur automatically.    To schedule or reschedule, please call [phone] and press 1.    NOTE: If you would like to do an extra transmission, please call [phone] and press 3 to speak to a nurse BEFORE transmitting. This ensures that the Device Clinic staff is aware of the reason you are sending a transmission, and can follow-up with you after it has been reviewed.    We will be checking your implanted device from home (remotely) every three months unless otherwise instructed. We will need to see you in the clinic at least once a year. You may need to be seen in the clinic sooner depending on the results of your check.    Please be aware:    The follow-up schedule is like a Physician prescription.    Your remote monitor is paired to your specific implanted device.      Sincerely,    [facility]

## 2021-06-19 NOTE — LETTER
Letter by Vivek Guerrero MD at      Author: Vivek Guerrero MD Service: -- Author Type: --    Filed:  Encounter Date: 5/10/2019 Status: (Other)         Buck Sinclair  [address] [place] [address] [address] [place] [address]      May 10, 2019      Dear Mr. Sinclair,    As of May 20th 2019, [facility] Cardiology clinics and [facility] Primary Care clinics will be combining their anticoagulation/INR programs. .  You should continue to use your home monitoring company to report your INR results. Your home monitoring company will communicate with our [facility] Anticoagulation Management Program which is staffed by nurses and pharmacists with special training in warfarin management. Our program will offer you and your cardiologist access to:    Dedicated staff that will call you on the day of your test to discuss your results and warfarin dosing instructions    Medication and diet interaction reviews with each INR test    Reminder calls and letters when you are overdue for your INR    Special phone line to call for any appointments, problems, or questions that you may have about your warfarin ([phone])    Anticoagulation nurses are available at [facility] and [facility] if you need an anticoagulation nurse visit    Our program will work closely with you and your cardiologist to make sure this transition goes smoothly. Our anticoagulation nurses and pharmacists will have access to the same chart that your cardiologist uses and they will keep your cardiologist informed of your INR results your warfarin dosing.  [facility] is dedicated to continue to provide you with safe and effective warfarin management.  As a reminder, it is very important to have your INR test closely monitored and warfarin dose adjusted as needed to ensure your safety. It is also important to notify us of any changes to your medications, changes in diet, upcoming procedures or surgeries, traveling out of town for an extended period of  time or if you experience any bruising or bleeding that is abnormal.   If you have any questions or concerns please call our Anticoagulation Program at [phone]. Our clinic staff is available Monday through Friday 8:00 am to 5:00pm.

## 2021-06-19 NOTE — ANESTHESIA PREPROCEDURE EVALUATION
Anesthesia Evaluation      Patient summary reviewed   No history of anesthetic complications     Airway   Mallampati: II  Neck ROM: full   Pulmonary - normal exam    breath sounds clear to auscultation  (+) COPD, shortness of breath, a smoker  (-) sleep apnea                         Cardiovascular   Exercise tolerance: > or = 4 METS  (+) pacemaker, hypertension, CAD, dysrhythmias, cardiomyopathy, hypercholesterolemia,     Rhythm: irregular  Rate: normal,    murmur   ROS comment:   The estimated left ventricular ejection fraction is 50%. This represents a mildly decreased ejection fraction    Right Ventricle: Normal systolic function. The right ventricle is mildly dilated.    Normal appearing left atrial appendage.    Mild tricuspid valve regurgitation. Mild pulmonary hypertension present.  Left Main  The vessel was visualized by angiography and is large. The vessel exhibits minimal luminal irregularities.    Left Anterior Descending  The vessel was visualized by angiography and is moderate in size. There is severe diffuse disease throughout the vessel.     Mid LAD-1 lesion, 85% stenosed.       PCI:    Supplies used: CATH EMERGE 2.5MM X 12MM; STENT SYNERGY MR 3.50X20; STENT SYNERGY MR 2.50X20    There is no residual stenosis post intervention.       Mid LAD-2 lesion, 100% stenosed.       PCI:    Supplies used: STENT SYNERGY MR 2.50X24    There is no residual stenosis post intervention.       Lateral First Diagonal Branch     Lat 1st Diag lesion, 50% stenosed.       PCI:    Supplies used: CATH EMERGE 2.5MM X 12MM    There is a 50% residual stenosis post intervention.      Left Circumflex  The vessel was visualized by angiography. The vessel exhibits minimal luminal irregularities.    Right Coronary Artery  The vessel was visualized by angiography. The vessel exhibits minimal            Neuro/Psych - negative ROS     Endo/Other    (+) obesity,      GI/Hepatic/Renal    (-) GERD    Comments: BPH          Dental - normal  exam                        Anesthesia Plan  Planned anesthetic: total IV anesthesia and general mask    ASA 3   Induction: intravenous   Anesthetic plan and risks discussed with: patient and spouse    Post-op plan: routine recovery

## 2021-06-19 NOTE — PROGRESS NOTES
INR 1.8 at home. Eating more fresh salads. Will decrease greens and retest in one week. After talking with pt and discussing history of greens/salads and medication change. Pt will  continue  with current diet and dosing of Warfarin.  Continue with moderation of Vit K and green leafy vegetables. Cautioned to call with increase bruising or bleeding. Reminded to call with medication change especially antibiotic. Call with any questions or concerns or any up coming procedures. Cautioned about using Herbal medication.

## 2021-06-19 NOTE — PROGRESS NOTES
In clinic device check with Device Nurse followed by office visit with Yoahnnes Shultz NP.  Please see link for full device report.  Patient was informed of results and next follow up during today's visit.

## 2021-06-19 NOTE — LETTER
Letter by Hugh Payton MD at      Author: Hugh Payton MD Service: -- Author Type: --    Filed:  Encounter Date: 7/17/2019 Status: (Other)         Vivek Guerrero MD  [address] [place] [id number]                                  July 17, 2019    Patient: Buck Sinclair   MR Number: [id number]   YOB: 1945   Date of Visit: 7/17/2019     Dear Dr. Cesar MD:    Thank you for referring Buck Sinclair to me for evaluation. Below are the relevant portions of my assessment and plan of care.    If you have questions, please do not hesitate to call me. I look forward to following Buck along with you.    Sincerely,        Hugh Payton MD          CC  No Recipients  Hugh Payton MD  7/17/2019 10:52 AM  Sign at close encounter  Pulmonary Clinic Follow-up Visit    Assessment/Plan:  72 year old male with a history of tobacco dependence in remission, CAD s/p PCI/stents, afib s/p PVL with ICD/PPM on anticoagulation, history of cavitary lesion and extensive pneumonia in LLL in Oct 2017, subsequent recurrent LLL pneumonia, since resolved, presenting for follow up. He appears to be stable from a respiratory standpoint without any further hemoptysis. Dyspnea on exertion is stable. His CT scan showed decreasing size of the LLL opacity consistent with resolving infectious process and scarring in that region.    Plan:  #COPD, GOLD class B (CAT >10, few exacerbations/low risk for hospitalization). Minimal smoking history so this is probably due to his work as a   - continue budesonide-formoterol 160-4.5 mcg two inhalations BID with spacer; rinse/gargle/spit water after use. No changes today but will consider tapering at next visit.   - continue tiotropium HandiHaler one inhalation daily  - Cont albuterol as needed  - encouraged exercise, weight loss as able  - offered supplemental O2 for exertional hypoxemia seen on 6MWT today. He declined. Will let me know if he changes his  mind  - can consider another round of pulmonary rehab if he develops more symptoms. No indication for now.  - UTD w/ pneumonia and flu vaccines. Rec flu shot every Fall.    #LLL nodule: decreasing in size on 3 month f/u scan after abx. 9mm -> 7mm and associated with scarring  - no further imaging needed unless recurrent symptoms    #Hemoptysis: no further recurrence. Likely was due to the LLL pneumonia in the setting of anticoagulation.  - continue INR goal close to 2, as discussed with Dr. Renee   - he'll let me know if any recurrence.    Follow up in 6 months.    CCx: follow up of hemoptysis, and COPD GOLD class B    HPI: Interim history: I last saw Buck on 4/11/2019. Since that time, he reports he's doing about the same. Still has occasional cough.  No further hemotpysis since I last saw him. Feels the antibiotic we gave him last visit was somewhat helpful.  Complted pulm rehab in the past, didn't find it particularly helpful.  Gets winded with some activities.  Doing a lot of work at his daughter's cabin, trying to get it ready for sale. Does yard work.  INR >3 last check.       ROS:  A 12-system review was obtained and was negative with the exception of the symptoms endorsed in the history of present illness.    PMH:  Past Medical History:   Diagnosis Date   ? Anemia    ? BPH (benign prostatic hyperplasia)    ? COPD, group B, by GOLD 2017 classification (H)    ? Coronary artery disease due to calcified coronary lesion    ? Dyslipidemia, goal LDL below 70    ? Essential hypertension    ? History of cardiomyopathy    ? History of transfusion    ? Persistent atrial fibrillation (H)    ? Pneumonia of left lower lobe due to infectious organism (H) 10/4/2017   ? Skin cancer of trunk    ? Status post catheter ablation of atrial fibrillation 6/7/2017    PVI 4-2011 (Cryo/PVI + roof line + CTI line) Re-do PVI 7-2011 (RFA/PVI + CFE + VIDYA + confirmed CTI line)   ? Ventricular tachycardia (H)        PSH:  Past Surgical  History:   Procedure Laterality Date   ? CARDIAC DEFIBRILLATOR PLACEMENT     ? CARDIOVERSION  07/11/2018    x20, last 2/12/15, 10/2015, 11/18/16, 6/16/17 by Lauren Foster CNP   ? CARDIOVERSION  07/11/2018   ? COLONOSCOPY N/A 4/28/2017    Procedure: COLONOSCOPY with 2 ascending polyps and 1 transverse polyp;  Surgeon: Jose Whittington MD;  Location: [facility] GI;  Service:    ? CV CORONARY ANGIOGRAM N/A 9/20/2017    Procedure: Coronary Angiogram;  Surgeon: Sergio Cervantes MD;  Location: [facility] Cath Lab;  Service:    ? EP ICD INSERT     ? FRACTURE SURGERY Left     wrist   ? INGUINAL HERNIA REPAIR Left 1967    while in the [employer] in [place] after 13 month in [place]   ? INSERT / REPLACE / REMOVE PACEMAKER     ? left hand surgery---tendon repair     ? NE ABLATE HEART DYSRHYTHM FOCUS  04/2011    Catheter Ablation Atrial Fibrillation PVI Apr 2011 (Cryo+RF-PVI + roof line + CTI line)   ? NE ABLATE HEART DYSRHYTHM FOCUS  07/2011    Re-do PVI Jul 2011 (RFA-PVI + CFE + VIDYA + confirmation of CTI line)   ? NE MYERS W/O FACETEC FORAMOT/DSKC 1/2 VRT SEG, CERVICAL      Laminectomy Lumbar;  Recorded: 03/09/2012;   ? TOTAL SHOULDER REPLACEMENT Right 03/03/2016    Dr. Abernathy of [facility]       Allergies:  Allergies   Allergen Reactions   ? Adhesive Other (See Comments)     ADHESIVE TAPE; SKIN IRRITATION  Foam tape:  Skin removed with tape removal   ? Amiodarone      ADVERSE REACTION.  Sunlight sensitivity.   ? Lisinopril Cough       Family HX:  Family History   Problem Relation Age of Onset   ? Cancer Mother         leukemia   ? Cancer Father         bladder   ? Cancer Sister         breast with lung met.   ? Aneurysm Sister    ? CABG Brother    ? CABG Brother    ? Valvular heart disease Brother         valve replacement       Social Hx:  Social History     Socioeconomic History   ? Marital status:      Spouse name: Neela   ? Number of children: Not on file   ? Years of education: 12   ? Highest education  level: Not on file   Occupational History   ? Occupation:      Employer: RETIRED   ? Occupation:  police     Comment: [place]   Social Needs   ? Financial resource strain: Not on file   ? Food insecurity:     Worry: Not on file     Inability: Not on file   ? Transportation needs:     Medical: Not on file     Non-medical: Not on file   Tobacco Use   ? Smoking status: Former Smoker     Packs/day: 1.00     Years: 4.00     Pack years: 4.00     Types: Cigarettes     Last attempt to quit: 1968     Years since quittin.5   ? Smokeless tobacco: Never Used   Substance and Sexual Activity   ? Alcohol use: Yes     Alcohol/week: 1.2 oz     Types: 2 Cans of beer per week     Comment: 1 beer per week   ? Drug use: No   ? Sexual activity: Yes     Partners: Female     Birth control/protection: Post-menopausal   Lifestyle   ? Physical activity:     Days per week: Not on file     Minutes per session: Not on file   ? Stress: Not on file   Relationships   ? Social connections:     Talks on phone: Not on file     Gets together: Not on file     Attends Hoahaoism service: Not on file     Active member of club or organization: Not on file     Attends meetings of clubs or organizations: Not on file     Relationship status: Not on file   ? Intimate partner violence:     Fear of current or ex partner: Not on file     Emotionally abused: Not on file     Physically abused: Not on file     Forced sexual activity: Not on file   Other Topics Concern   ? Not on file   Social History Narrative   ? Not on file       Current Meds:  Current Outpatient Medications   Medication Sig Dispense Refill   ? ACETAMINOPHEN (TYLENOL ORAL) Take 1,000 mg by mouth 2 (two) times a day.      ? amoxicillin (AMOXIL) 500 MG tablet Take prior to the Dentist     ? ascorbic acid (VITAMIN C) 1000 MG tablet Take 1,000 mg by mouth daily.     ? atorvastatin (LIPITOR) 40 MG tablet Take 40 mg by mouth at bedtime.     ? budesonide-formoterol (SYMBICORT)  "160-4.5 mcg/actuation inhaler Inhale 2 puffs 2 (two) times a day.     ? cephalexin (KEFLEX) 500 MG capsule Take 1 capsule by mouth 3 (three) times a day.  0   ? co-enzyme Q-10 30 mg capsule Take 100 mg by mouth daily.     ? furosemide (LASIX) 20 MG tablet TAKE 1 TABLET(20 MG) BY MOUTH DAILY 90 tablet 1   ? losartan (COZAAR) 50 MG tablet TAKE 1 TABLET BY MOUTH EVERY DAY 90 tablet 2   ? MAG 64 64 mg TbEC delayed-release tablet TAKE 1 TABLET BY MOUTH TWICE DAILY 180 tablet 1   ? multivitamin (MULTIVITAMIN) per tablet Take 1 tablet by mouth daily.     ? omega 3-dha-epa-fish oil (FISH OIL) 1,000 mg (120 mg-180 mg) cap Take 2 tablets by mouth 2 (two) times a day.      ? polyethylene glycol (MIRALAX) 17 gram packet Take 17 g by mouth daily.     ? sotalol (BETAPACE) 80 MG tablet TAKE 2 TABLETS(160 MG) BY MOUTH TWICE DAILY 360 tablet 1   ? tiotropium (SPIRIVA) 18 mcg inhalation capsule Place 18 mcg into inhaler and inhale daily.     ? VITAMIN D2 50,000 unit capsule Take 50,000 Units by mouth 2 (two) times a week. SUN and WED  3   ? warfarin (COUMADIN/JANTOVEN) 2.5 MG tablet TAKE 5 MG TABLETS BY MOUTH M W F AND 2.5 MG TABLETS ALL OTHER DAYS. 90 tablet 0   ? warfarin (COUMADIN/JANTOVEN) 5 MG tablet Take as directed  0   ? albuterol (PROAIR HFA;PROVENTIL HFA;VENTOLIN HFA) 90 mcg/actuation inhaler Inhale 2 puffs every 6 (six) hours as needed for wheezing. 1 Inhaler 11   ? aspirin 81 MG EC tablet Take 1 tablet (81 mg total) by mouth daily. 30 tablet 11   ? furosemide (LASIX) 20 MG tablet TAKE 1 TABLET(20 MG) BY MOUTH DAILY 90 tablet 3   ? vardenafil (LEVITRA) 10 MG tablet Take 10 mg by mouth daily as needed for erectile dysfunction.       No current facility-administered medications for this visit.        Physical Exam:  /60   Pulse 62   Resp 16   Ht 6' 3\" (1.905 m)   Wt (!) 276 lb 8 oz (125.4 kg)   SpO2 96%   BMI 34.56 kg/m     Gen: alert, oriented, no distress  HEENT: nasal turbinates are unremarkable, no " oropharyngeal lesions, no cervical or supraclavicular lymphadenopathy  CV: RRR, no M/G/R  Resp: CTAB, no focal crackles or wheezes  Abd: soft, nontender, no palpable organomegaly  Skin: no apparent rashes  Ext: no cyanosis, clubbing or edema  Neuro: alert, nonfocal    Labs:  Reviewed  Dec 2018  Chem panel wnl  TSH wnl  hgb 12.1 Oct 2018    Previous testing  DONNA neg  blasto neg  Histo testing neg  c-ANCA neg  HIV neg  RF neg    Bronch/LLL BAL cultures neg      Imaging studies:  CT chest April 2018  IMPRESSION:   CONCLUSION:  1.  Mild scarring and slight bronchiectasis present at both lung bases. No active pneumonia identified.    CT chest 7/15/2019  IMPRESSION:   CONCLUSION:   1.  Nodular opacity of the left lower lobe of interest on 04/17/2019 is less conspicuous today and probably explained by scarring. Additional 6 month chest CT follow-up is recommended.  2.  Emphysema and scattered areas of scarring elsewhere in both lungs.  3.  Advanced multivessel coronary artery disease.    PFTs 2018  FEV1/FVC is 0.56 and is reduced.  FEV1 is 73% predicted and is reduced.  FVC is 96% predicted and normal.  There was no improvement in spirometry after a single inhaled dose of bronchodilator.  TLC is 99% predicted and is normal.  RV is 113% predicted and is normal.  DLCO is 93% predicted and is normal when it   is corrected for hemoglobin.     Impression:  Full Pulmonary Function Test is abnormal.  PFTs are consistent with mild obstructive disease.  Spirometry is not consistent with reversibility.  There is no hyperinflation.  There is no air-trapping.  Diffusion capacity when corrected for hemoglobin is normal.  Declines in both FEV1 and TLC since 2009 with overall preservation of diffusing capacity.    Hugh (Woodrow Payton MD  [facility]  Pager [phone]  Clinic [phone]

## 2021-06-19 NOTE — PROGRESS NOTES
After talking with pt and discussing history of greens/salads and medication change. Pt will  continue  with current diet and dosing of Warfarin.  Continue with moderation of Vit K and green leafy vegetables. Cautioned to call with increase bruising or bleeding. Reminded to call with medication change especially antibiotic. Call with any questions or concerns or any up coming procedures. Cautioned about using Herbal medication.  Increase dose for CV.

## 2021-06-19 NOTE — PROGRESS NOTES
[facility] Heart Care Note    Assessment:  Atrial atrial fibrillation dating back to 1997 with multiple external cardioversions        Atrial fibrillation is very symptomatic with generalized weakness fatigue but not so much palpitations  Chronic amiodarone was partially effective;Continued for many years ; stopped because of photosensitive and other adverse effects   Failed Tikosyn  Pulmonary vein isolation done on 2 occasions 2011   Post PVI Cardoversion February 12/ 2015 ; CV 10-         Cardioversion  11-        Atrial fibrillation April 25, 2017        CV 5-; relapse < 36 hour  Long-standing advanced dilated cardiomyopathy dating back to 1997       Recent stress nuclear scan; April 30 2014 showed ejection fraction 47% without ischemia        Echocardiogram 21 September 2015 showed ejection fraction equals 50%        Transesophageal echocardiogram December 4, 2017; ejection fraction 50% sinus node dysfunction   MedtronicPacemaker implanted May 1999-dual atrial leads;         generator replacement May 2005          Removal of atrial and ventricular pacing leads with placement of ICD system 6 1 2014 and Medtronic single coil   Chronic anticoagulation with warfarin ; he had excessive skin bleeding from Eliquis   Obesity-substantial weight reduction on conscientious diet   COPD felt be related to previous  exposure   Negative obstructive sleep apnea evaluation   Hypertension  Hyperkalemia  Hyperlipidemia well controlled on statin; LDL equals 81  Coronary artery disease with stenting to proximal LAD September 20, 2017      Plan:  Blood work today will include a comprehensive metabolic profile, TSH and magnesium  I called in a refill of the Slow-Mag, take 1 tablet twice daily  Schedule for a  IV adenosine nuclear stress scan  Proceed with cardioversion next Wednesday while on high-dose sotalol 160 mg twice daily  We reviewed alternative anti-arrhythmic drugs, you previously went to  concern which was stopped, amiodarone because toxicity should not be used again, we could consider dronedarone  You did poorly on metoprolol felt lousy  If you have relapse of atrial fibrillation we could consider restarting diltiazem for better rate control-he seemed to do well on that medication    I had a kirstin discussion about atrial fibrillation and how is likely that he will go into atrial fibrillation and remain in chronic atrial fibrillation.  People can live in atrial fibrillation, have good prognosis; will need continuous anticoagulation and satisfactory rate control        Subjective:    I had the opportunity to see.Buck Sinclair , who is a 73 y.o. male with a known history of atrial fibrillation    He underwent cardioversion of atrial fibrillation May 22.  By device interrogation he was back in atrial fibrillation by 523-less than 36 hours  I thought was to accept atrial fibrillation not pursue any further rhythm control strategies.  He is failed T consent, adverse reaction to amiodarone he has not been on amiodarone  Not a candidate for flecainide/Propafenone  considering his coronary artery disease and history LV dysfunction  He feels miserable  Tired fatigue no energy  Shortness of breath on exertion  No particular edema or obvious fluid retention  No awareness of palpitations  No syncopal or near syncopal episodes  No chest pain  He wonders if he should have another stress study.  At the time of his stress scan he was fatigue but did not have angina.  Angiography did show LAD lesion and he did have a PCI of his proximal LAD September 20, 2017    With early relapse atrial fibrillation I recommended stopping sotalol and started him on metoprolol succinate 50 mg daily.  He felt quite poorly on this medication and felt that a number of side effects such as fatigue low energy and just feeling rotten were related to the metoprolol  He recalls tolerating diltiazem without difficulty  During his last  episode atrial fibrillation he did have a lot of ventricular pacing in the fallback mode.    The device was reprogrammed to VVI equals 50 to reduce ventricular pacing which is now 2.6%  He seems to tolerate warfarin although does note some bruising on his arms and shows me a small ecchymotic spots because she would like to get off warfarin, he is interested him left atrial appendage occlusion device   But with coronary artery disease and previous stent, he would likely need to stay on Plavix or aspirin    It is my impression that his symptoms are in excess to his physiology and I wonder if he has depression    Request a stress nuclear scan which I will schedule  He requested a magnesium level which will be drawn; and a refill for Slow-Mag was sent to his pharmacy            Problem List:  Patient Active Problem List   Diagnosis     Dyslipidemia, goal LDL below 70     Essential hypertension     Persistent Atrial Fibrillation     ICD (implantable cardioverter-defibrillator), dual, in situ     Status post catheter ablation of atrial fibrillation     Coronary artery disease due to calcified coronary lesion     COPD, group D, by GOLD 2017 classification (H)     Medical History:  Past Medical History:   Diagnosis Date     Anemia      BPH (benign prostatic hyperplasia)      COPD (chronic obstructive pulmonary disease) (H)      Coronary artery disease due to calcified coronary lesion      Dyslipidemia, goal LDL below 70      Essential hypertension      History of cardiomyopathy      History of transfusion      Persistent atrial fibrillation (H)      Pneumonia of left lower lobe due to infectious organism (H) 10/4/2017     Skin cancer of trunk      Status post catheter ablation of atrial fibrillation 6/7/2017    PVI 4-2011 (Cryo/PVI + roof line + CTI line) Re-do PVI 7-2011 (RFA/PVI + CFE + VIDYA + confirmed CTI line)     Ventricular tachycardia (H)      Surgical History:  Past Surgical History:   Procedure Laterality Date      CARDIAC DEFIBRILLATOR PLACEMENT       CARDIOVERSION      x20, last 2/12/15, 10/2015, 11/18/16, 6/16/17 by Lauren Foster CNP     COLONOSCOPY N/A 4/28/2017    Procedure: COLONOSCOPY with 2 ascending polyps and 1 transverse polyp;  Surgeon: Jose Whittington MD;  Location: [facility] GI;  Service:      CV CORONARY ANGIOGRAM N/A 9/20/2017    Procedure: Coronary Angiogram;  Surgeon: Sergio Cervantes MD;  Location: [facility] Cath Lab;  Service:      EP ICD INSERT       FRACTURE SURGERY Left     wrist     INGUINAL HERNIA REPAIR Left 1967    while in the [employer] in [place] after 13 month in [place]     INSERT / REPLACE / REMOVE PACEMAKER       OK ABLATE HEART DYSRHYTHM FOCUS  04/2011    Catheter Ablation Atrial Fibrillation PVI Apr 2011 (Cryo+RF-PVI + roof line + CTI line)     OK ABLATE HEART DYSRHYTHM FOCUS  07/2011    Re-do PVI Jul 2011 (RFA-PVI + CFE + VIDYA + confirmation of CTI line)     OK MYERS W/O FACETEC FORAMOT/DSKC 1/2 VRT SEG, CERVICAL      Laminectomy Lumbar;  Recorded: 03/09/2012;     TOTAL SHOULDER REPLACEMENT Right 03/03/2016    Dr. Abernathy of [facility]     Social History:  Social History     Social History     Marital status:      Spouse name: Neela     Number of children: N/A     Years of education: 12     Occupational History      Retired      police      [employer]     Social History Main Topics     Smoking status: Former Smoker     Packs/day: 0.50     Years: 4.00     Types: Cigarettes     Quit date: 1/1/1968     Smokeless tobacco: Never Used     Alcohol use 1.2 oz/week     2 Cans of beer per week      Comment: 1 beer per week     Drug use: No     Sexual activity: Yes     Partners: Female     Birth control/ protection: Post-menopausal     Other Topics Concern     Not on file     Social History Narrative       Review of Systems:      General: WNL  Eyes: WNL  Ears/Nose/Throat: WNL  Lungs: WNL  Heart: WNL  Stomach: WNL  Bladder: WNL  Muscle/Joints: WNL  Skin: WNL  Nervous  System: WNL  Mental Health: WNL     Blood: WNL        Family History:  Family History   Problem Relation Age of Onset     Cancer Mother      leukemia     Cancer Father      bladder     Cancer Sister          Allergies:  Allergies   Allergen Reactions     Adhesive Other (See Comments)     ADHESIVE TAPE; SKIN IRRITATION     Amiodarone      ADVERSE REACTION.  Sunlight sensitivity.     Lisinopril Cough       Medications:  Current Outpatient Prescriptions   Medication Sig Dispense Refill     ACETAMINOPHEN (TYLENOL ORAL) Take 1,000 mg by mouth 2 (two) times a day.        albuterol (PROAIR HFA;PROVENTIL HFA;VENTOLIN HFA) 90 mcg/actuation inhaler Inhale 2 puffs every 6 (six) hours as needed for wheezing. 1 Inhaler 11     amoxicillin (AMOXIL) 500 MG tablet Take prior to the Dentist       ascorbic acid (VITAMIN C) 1000 MG tablet Take 1,000 mg by mouth daily.       atorvastatin (LIPITOR) 40 MG tablet Take 40 mg by mouth at bedtime.       budesonide-formoterol (SYMBICORT) 160-4.5 mcg/actuation inhaler Inhale 2 puffs 2 (two) times a day.       clopidogrel (PLAVIX) 75 mg tablet Take 1 tablet (75 mg total) by mouth daily. 30 tablet 11     co-enzyme Q-10 30 mg capsule Take 100 mg by mouth daily.       furosemide (LASIX) 20 MG tablet TAKE 1 TABLET(20 MG) BY MOUTH DAILY 90 tablet 1     losartan (COZAAR) 50 MG tablet Take 1 tablet (50 mg total) by mouth daily. 90 tablet 1     magnesium chloride (MAG 64) 64 mg TbEC delayed-release tablet Take 1 tablet (64 mg total) by mouth 2 (two) times a day. 60 tablet 0     multivitamin (MULTIVITAMIN) per tablet Take 1 tablet by mouth daily.       omega 3-dha-epa-fish oil (FISH OIL) 1,000 mg (120 mg-180 mg) cap Take 2 tablets by mouth 2 (two) times a day.        polyethylene glycol (MIRALAX) 17 gram packet Take 17 g by mouth daily.       sotalol (BETAPACE) 160 MG tablet Take 1 tablet (160 mg total) by mouth every 12 (twelve) hours.  0     tiotropium (SPIRIVA) 18 mcg inhalation capsule Place 18 mcg  "into inhaler and inhale daily.       vardenafil (LEVITRA) 10 MG tablet Take 10 mg by mouth daily as needed for erectile dysfunction.       VITAMIN D2 50,000 unit capsule Take 50,000 Units by mouth 2 (two) times a week. SUN and WED  3     warfarin (COUMADIN) 2.5 MG tablet Take 5 mg M,W,F and 2.5 mg all other days. 90 tablet 0     No current facility-administered medications for this visit.          Surgical site  ICD is well-healed to the right subclavicular site     Device interrogation  Chronic atrial fibrillation since May 25  Ventricular rate well controlled and heart rate does not get above 120 bpm  Ventricular pacing 2.6% at VVI equals 50  No ventricular tachycardia  Lead function satisfactory  Battery voltage good for another 3.9 years    Objective:   Vital signs:  /80 (Patient Site: Left Arm, Patient Position: Sitting, Cuff Size: Adult Large)  Pulse 68  Resp 18  Ht 6' 2\" (1.88 m)  Wt (!) 282 lb (127.9 kg)  BMI 36.21 kg/m2  Seems sad  He is alert and appropriate    Physical Exam:      GENERAL APPEARANCE: Alert, cooperative and in no acute distress.  HEENT: No scleral icterus. No Xanthelasma. Oral mucous membranes pink and moist.  NECK: JVP echo to assess cm. No Hepatojugular reflux. Thyroid not  Palpable  CHEST: clear to auscultation and percussion  CARDIOVASCULAR: S1, S2/6 systolic ejection murmur at the sternum not at the apex.  I do not recall that he had a murmur in the past and previous studies have not shown aortic or mitral valve disease     Brachial, radial  pulses are intact and symmetric.   No carotid bruits noted.  ABDOMEN: Non tender. BS+. No bruits.  EXTREMITIES: No cyanosis, clubbing or edema.    Lab Results:  LIPIDS:  Lab Results   Component Value Date    CHOL 142 05/02/2018    CHOL 143 09/14/2017    CHOL 156 12/13/2016     Lab Results   Component Value Date    HDL 53 05/02/2018    HDL 51 09/14/2017    HDL 60 12/13/2016     Lab Results   Component Value Date    LDLCALC 80 05/02/2018 "    LDLCALC 78 09/14/2017    LDLCALC 81 12/13/2016     Lab Results   Component Value Date    TRIG 46 05/02/2018    TRIG 69 09/14/2017    TRIG 73 12/13/2016     No components found for: CHOLHDL    BMP:  Lab Results   Component Value Date    CREATININE 1.11 05/17/2018    BUN 26 05/17/2018     05/17/2018    K 4.6 05/22/2018     (H) 05/17/2018    CO2 23 05/17/2018         This note has been dictated using voice recognition software. Any grammatical or context distortions are unintentional and inherent to the software.  Everett Renee MD  [facility]  [phone]

## 2021-06-20 NOTE — LETTER
Letter by Juanita Jim RDCS at      Author: Juanita Jim RDCS Service: -- Author Type: --    Filed:  Encounter Date: 2/3/2020 Status: (Other)         Buck Sinclair  [address] [place] [address] [address] [place] [address]      February 3, 2020      Dear Mr. Sinclair,    RE: Remote Results    We are writing to you regarding your recent Remote ICD check from home. Your transmission was received successfully. Battery status is satisfactory at this time.     Your results are showing no significant changes.    Your next device appointment will be a clinic visit.  Please call in February to schedule.      To schedule or reschedule, please call [phone] and press 1.    NOTE: If you would like to do an extra transmission, please call [phone] and press 3 to speak to a nurse BEFORE transmitting. This ensures that the Device Clinic staff is aware of the reason you are sending a transmission, and can follow-up with you after it has been reviewed.    We will be checking your implanted device from home (remotely) every three months unless otherwise instructed. We will need to see you in the clinic at least once a year. You may need to be seen in the clinic sooner depending on the results of your check.    Please be aware:    The follow-up schedule is like a Physician prescription.    Your remote monitor is paired to your specific implanted device.      Sincerely,    [facility]

## 2021-06-20 NOTE — PROGRESS NOTES
INR 3.2 will decrease dose to 5 mg Sun and tue and 2.5 mg all other days. After talking with pt and discussing history of greens/salads and medication change. Pt will  continue  with current diet and dosing of Warfarin.  Continue with moderation of Vit K and green leafy vegetables. Cautioned to call with increase bruising or bleeding. Reminded to call with medication change especially antibiotic. Call with any questions or concerns or any up coming procedures. Cautioned about using Herbal medication.

## 2021-06-20 NOTE — PROGRESS NOTES
INR 3.2. Pt is out of state and wife will increase greens and decrease ETOH. They drove to [place] so will keep INR on the higher side. Wife agrees with plan. After talking with pt and discussing history of greens/salads and medication change. Pt will  continue  with current diet and dosing of Warfarin.  Continue with moderation of Vit K and green leafy vegetables. Cautioned to call with increase bruising or bleeding. Reminded to call with medication change especially antibiotic. Call with any questions or concerns or any up coming procedures. Cautioned about using Herbal medication.

## 2021-06-20 NOTE — ANESTHESIA CARE TRANSFER NOTE
Pt breathing spontaneously, follows commands, suctioned extubated. Spontaneous respirations with SFM to PACU. VSS.    Last vitals:   Vitals:    08/30/18 1257   BP: 130/84   Pulse: 68   Resp: 16   Temp: 36.5  C (97.7  F)   SpO2: 100%     Patient's level of consciousness is drowsy  Spontaneous respirations: yes  Maintains airway independently: yes  Dentition unchanged: yes  Oropharynx: oropharynx clear of all foreign objects    QCDR Measures:  ASA# 20 - Surgical Safety Checklist: WHO surgical safety checklist completed prior to induction  PQRS# 430 - Adult PONV Prevention: 4558F - Pt received => 2 anti-emetic agents (different classes) preop & intraop  ASA# 8 - Peds PONV Prevention: NA - Not pediatric patient, not GA or 2 or more risk factors NOT present  PQRS# 424 - Geovanna-op Temp Management: 4559F - At least one body temp DOCUMENTED => 35.5C or 95.9F within required timeframe  PQRS# 426 - PACU Transfer Protocol: - Transfer of care checklist used  ASA# 14 - Acute Post-op Pain: ASA14B - Patient did NOT experience pain >= 7 out of 10

## 2021-06-20 NOTE — PROGRESS NOTES
Pt was no show following attempted LAAO implant last week.  Will reach out to LAAO team regarding further f/u recommendations.

## 2021-06-20 NOTE — ANESTHESIA PREPROCEDURE EVALUATION
Anesthesia Evaluation      Patient summary reviewed   No history of anesthetic complications     Airway   Mallampati: I  Neck ROM: full   Pulmonary - normal exam    breath sounds clear to auscultation  (+) COPD,   (-) not a smoker (Former)                         Cardiovascular   Exercise tolerance: > or = 4 METS  (+) pacemaker (ICD, medtronic), hypertension, CAD, dysrhythmias (a.fib,  v.tach.  H/o PVI), , hypercholesterolemia, PVD    (-) murmur  ECG reviewed  Rhythm: irregular  Rate: abnormal,    no murmur      Neuro/Psych - negative ROS     Endo/Other    (+) obesity (BMI 36),      Comments: BPH  Anemia    GI/Hepatic/Renal - negative ROS      Other findings: 7/19/18 NM KANDICE  Conclusion      The pharmacologic nuclear stress test is abnormal.    There is a small area of mixed ischemia and nontransmural infarction in the apical segment(s) of the left ventricle.    The left ventricular ejection fraction is 61%.    The patient is at a low risk of future cardiac ischemic events.    When compared to the images of 9/13/2017, the ejection fraction has increased from 53% to 61%, otherwise, the findings are similar.      Device check 8/28/18  Type: alert remote ICD transmission for AT/AF exceeding burden.  Presenting rhythm: normal sinus and AP-VS, rate 50 bpm, with occasional PACs.  Battery/lead status: stable  Arrhythmias: since 8/14/18, three mode switch episodes, EGMs confirms AT/AF, longest 47 hrs, burden 14%, V-rates controlled. No VT/VF detected.  Anticoagulant: on warfarin  Comments: normal ICD function. Routed to device RN.  Device/lead alerts: none. prd  ADD: transmission reviewed. See note in Epic, Known PAF. Patient alert tones are sounding due to patient being away from home monitor too long. Will  see if Rep can reset tones while in-house tomorrow. MLG    8/29/18 Echo  Summary     Technical Quality: Limited visualization due to body habitus.    Left ventricle ejection fraction is normal. The estimated left  ventricular ejection fraction is 55%.    Left Atrium: Left atrial volume is moderately increased.              Dental - normal exam                        Anesthesia Plan  Planned anesthetic: general endotracheal  Ketamine 50 mg IV  Phenylephrine inline    Have magnet available    Decadron 10 mg IV  Zofran    Reverse with Sugammadex      KANDICE for cardiology  ASA 4

## 2021-06-20 NOTE — PROGRESS NOTES
NR 3.0 at home. Continue current dose of 5 mg sun and wed and 2.5 mg all other days. Retest in 2 weeks. After talking with pt and discussing history of greens/salads and medication change. Pt will  continue  with current diet and dosing of Warfarin.  Continue with moderation of Vit K and green leafy vegetables. Cautioned to call with increase bruising or bleeding. Reminded to call with medication change especially antibiotic. Call with any questions or concerns or any up coming procedures. Cautioned about using Herbal medication.  Spoke with wife.

## 2021-06-20 NOTE — LETTER
Letter by Huhg Payton MD at      Author: Hugh Payton MD Service: -- Author Type: --    Filed:  Encounter Date: 1/16/2020 Status: Signed         Vivek Guerrero MD  [address] [place] [id number]                                  January 16, 2020    Patient: Buck Sinclair   MR Number: [id number]   YOB: 1945   Date of Visit: 1/16/2020     Dear Dr. Cesar MD:    Thank you for referring Buck Sinclair to me for evaluation. Below are the relevant portions of my assessment and plan of care.    If you have questions, please do not hesitate to call me. I look forward to following Buck along with you.    Sincerely,        Hugh Payton MD            MD Tata Epps [place]raham, MD  1/16/2020 10:00 AM  Sign when Signing Visit  Pulmonary Clinic Follow-up Visit    Assessment/Plan:  72 year old male with a history of tobacco dependence in remission, CAD s/p PCI/stents, afib s/p PVL with ICD/PPM on anticoagulation, history of cavitary lesion and extensive pneumonia in LLL in Oct 2017, subsequent recurrent LLL pneumonia, since resolved, presenting for follow up. He appears to be stable from a respiratory standpoint without any further hemoptysis. Dyspnea on exertion is stable but does seem to limit him significantly, especially in the winter.    Plan:  #COPD, GOLD class B (CAT >10, few exacerbations/low risk for hospitalization). Minimal smoking history so this is probably due to his work as a   - continue budesonide-formoterol 160-4.5 mcg two inhalations BID with spacer; rinse/gargle/spit water after use. No changes today due to effects of the winter weather on his breathing.  - continue tiotropium HandiHaler one inhalation daily  - Cont albuterol as needed  - encouraged exercise, weight loss as able  - he declines supplemental oxygen.  - will discuss utility of RHC with his cardiologist, given the exertional hypoxemia and LIMA which seems out of proportion  to his PFTs   - UTD w/ pneumonia and flu vaccines. Rec flu shot every Fall.    #LLL nodule: decreasing in size on 3 month f/u scan after abx. 9mm -> 7mm and associated with scarring  - no further imaging needed unless recurrent symptoms    #Hemoptysis: no further recurrence. Likely was due to the prior LLL pneumonia in the setting of anticoagulation.  - continue INR goal close to 2, as discussed with Dr. Renee   - he'll let me know if any recurrence.    Follow up in 3 months for reassessment.     CCx: follow up of hemoptysis, and COPD GOLD class B    HPI: Interim history: I last saw Buck on 7/17/2019. Since that time, he's been doing about the same. He does feel his breathing limits him especially in the winter and with activities.  He seems depressed about this. Not interested in oxygen at last visit.  Taking inhalers faithfully.  Has completed pulmonary rehab, didn't find it particularly helpful.    rarely coughs up any blood these days. Cough mostly dry.    ROS:  A 12-system review was obtained and was negative with the exception of the symptoms endorsed in the history of present illness.    PMH:  Past Medical History:   Diagnosis Date   ? Anemia    ? BPH (benign prostatic hyperplasia)    ? COPD, group B, by GOLD 2017 classification (H)    ? Coronary artery disease due to calcified coronary lesion    ? Dyslipidemia, goal LDL below 70    ? Essential hypertension    ? History of cardiomyopathy    ? History of transfusion    ? Persistent atrial fibrillation    ? Pneumonia of left lower lobe due to infectious organism (H) 10/4/2017   ? Skin cancer of trunk    ? Status post catheter ablation of atrial fibrillation 6/7/2017    PVI 4-2011 (Cryo/PVI + roof line + CTI line) Re-do PVI 7-2011 (RFA/PVI + CFE + VIDYA + confirmed CTI line)   ? Ventricular tachycardia (H)        PSH:  Past Surgical History:   Procedure Laterality Date   ? CARDIAC DEFIBRILLATOR PLACEMENT     ? CARDIOVERSION  07/11/2018    x20, last 2/12/15,  10/2015, 11/18/16, 6/16/17 by Lauren Foster CNP   ? CARDIOVERSION  07/11/2018   ? COLONOSCOPY N/A 4/28/2017    Procedure: COLONOSCOPY with 2 ascending polyps and 1 transverse polyp;  Surgeon: Jose Whittington MD;  Location: [facility] GI;  Service:    ? CV CORONARY ANGIOGRAM N/A 9/20/2017    Procedure: Coronary Angiogram;  Surgeon: Sergio Cervantes MD;  Location: [facility] Lab;  Service:    ? EP ICD INSERT     ? FRACTURE SURGERY Left     wrist   ? INGUINAL HERNIA REPAIR Left 1967    while in the [employer] in [place] after 13 month in [place]   ? INSERT / REPLACE / REMOVE PACEMAKER     ? left hand surgery---tendon repair     ? [place] ABLATE HEART DYSRHYTHM FOCUS  04/2011    Catheter Ablation Atrial Fibrillation PVI Apr 2011 (Cryo+RF-PVI + roof line + CTI line)   ? [place] ABLATE HEART DYSRHYTHM FOCUS  07/2011    Re-do PVI Jul 2011 (RFA-PVI + CFE + VIDYA + confirmation of CTI line)   ? [place] MYERS W/O FACETEC FORAMOT/DSKC 1/2 VRT SEG, CERVICAL      Laminectomy Lumbar;  Recorded: 03/09/2012;   ? TOTAL SHOULDER REPLACEMENT Right 03/03/2016    Dr. Abernathy of [facility]       Allergies:  Allergies   Allergen Reactions   ? Adhesive Other (See Comments)     ADHESIVE TAPE; SKIN IRRITATION  Foam tape:  Skin removed with tape removal   ? Amiodarone      ADVERSE REACTION.  Sunlight sensitivity.   ? Lisinopril Cough       Family HX:  Family History   Problem Relation Age of Onset   ? Cancer Mother         leukemia   ? Cancer Father         bladder   ? Cancer Sister         breast with lung met.   ? Aneurysm Sister    ? CABG Brother    ? CABG Brother    ? Valvular heart disease Brother         valve replacement       Social Hx:  Social History     Socioeconomic History   ? Marital status:      Spouse name: Neela   ? Number of children: Not on file   ? Years of education: 12   ? Highest education level: Not on file   Occupational History   ? Occupation:      Employer: RETIRED   ? Occupation: [employer]      Comment: [place]   Social Needs   ? Financial resource strain: Not on file   ? Food insecurity:     Worry: Not on file     Inability: Not on file   ? Transportation needs:     Medical: Not on file     Non-medical: Not on file   Tobacco Use   ? Smoking status: Former Smoker     Packs/day: 1.00     Years: 4.00     Pack years: 4.00     Types: Cigarettes     Last attempt to quit: 1968     Years since quittin.0   ? Smokeless tobacco: Never Used   Substance and Sexual Activity   ? Alcohol use: Yes     Alcohol/week: 2.0 standard drinks     Types: 2 Cans of beer per week     Comment: 1 beer per week   ? Drug use: No   ? Sexual activity: Yes     Partners: Female     Birth control/protection: Post-menopausal   Lifestyle   ? Physical activity:     Days per week: Not on file     Minutes per session: Not on file   ? Stress: Not on file   Relationships   ? Social connections:     Talks on phone: Not on file     Gets together: Not on file     Attends [employer] service: Not on file     Active member of club or organization: Not on file     Attends meetings of clubs or organizations: Not on file     Relationship status: Not on file   ? Intimate partner violence:     Fear of current or ex partner: Not on file     Emotionally abused: Not on file     Physically abused: Not on file     Forced sexual activity: Not on file   Other Topics Concern   ? Not on file   Social History Narrative   ? Not on file       Current Meds:  Current Outpatient Medications   Medication Sig Dispense Refill   ? ACETAMINOPHEN (TYLENOL ORAL) Take 1,000 mg by mouth 2 (two) times a day.      ? amoxicillin (AMOXIL) 500 MG tablet Take prior to the Dentist     ? ascorbic acid (VITAMIN C) 1000 MG tablet Take 1,000 mg by mouth daily.     ? atorvastatin (LIPITOR) 40 MG tablet Take 40 mg by mouth at bedtime.     ? budesonide-formoterol (SYMBICORT) 160-4.5 mcg/actuation inhaler Inhale 2 puffs 2 (two) times a day.     ? co-enzyme Q-10 30 mg capsule Take 100 mg by  "mouth daily.     ? furosemide (LASIX) 20 MG tablet TAKE 1 TABLET(20 MG) BY MOUTH DAILY 90 tablet 3   ? losartan (COZAAR) 50 MG tablet TAKE 1 TABLET BY MOUTH EVERY DAY 90 tablet 2   ? MAG 64 64 mg TbEC delayed-release tablet TAKE 1 TABLET BY MOUTH TWICE DAILY 180 tablet 1   ? multivitamin (MULTIVITAMIN) per tablet Take 1 tablet by mouth daily.     ? omega 3-dha-epa-fish oil (FISH OIL) 1,000 mg (120 mg-180 mg) cap Take 2 tablets by mouth 2 (two) times a day.      ? polyethylene glycol (MIRALAX) 17 gram packet Take 17 g by mouth daily.     ? sotalol (BETAPACE) 80 MG tablet TAKE 2 TABLETS(160 MG) BY MOUTH TWICE DAILY. 360 tablet 0   ? tiotropium (SPIRIVA) 18 mcg inhalation capsule Place 18 mcg into inhaler and inhale daily.     ? vardenafil (LEVITRA) 10 MG tablet Take 10 mg by mouth daily as needed for erectile dysfunction.     ? VITAMIN D2 50,000 unit capsule Take 50,000 Units by mouth 2 (two) times a week. SUN and WED  3   ? warfarin (COUMADIN/JANTOVEN) 2.5 MG tablet Take 1-2 tablets (2.5-5 mg total) by mouth daily. Adjust dose per INR results as instructed. 110 tablet 1   ? albuterol (PROAIR HFA;PROVENTIL HFA;VENTOLIN HFA) 90 mcg/actuation inhaler Inhale 2 puffs every 6 (six) hours as needed for wheezing. 1 Inhaler 11     No current facility-administered medications for this visit.        Physical Exam:  /82   Pulse 60   Resp 12   Ht 6' 3\" (1.905 m)   Wt (!) 288 lb (130.6 kg)   SpO2 90%   BMI 36.00 kg/m     Gen: alert, oriented, no distress  HEENT: nasal turbinates are unremarkable, no oropharyngeal lesions, no cervical or supraclavicular lymphadenopathy  CV: RRR, no M/G/R  Resp: CTAB, no focal crackles or wheezes  Abd: soft, nontender, no palpable organomegaly  Skin: no apparent rashes  Ext: no cyanosis, clubbing or edema  Neuro: alert, nonfocal    Labs:  Reviewed  Dec 2018  Chem panel wnl  TSH wnl  hgb 12.1 Oct 2018    Previous testing  DONNA neg  blasto neg  Histo testing neg  c-ANCA neg  HIV neg  RF " neg    Bronch/LLL BAL cultures neg      Imaging studies:  CT chest April 2018  IMPRESSION:   CONCLUSION:  1.  Mild scarring and slight bronchiectasis present at both lung bases. No active pneumonia identified.    CT chest 7/15/2019  IMPRESSION:   CONCLUSION:   1.  Nodular opacity of the left lower lobe of interest on 04/17/2019 is less conspicuous today and probably explained by scarring. Additional 6 month chest CT follow-up is recommended.  2.  Emphysema and scattered areas of scarring elsewhere in both lungs.  3.  Advanced multivessel coronary artery disease.      Left Ventricle: Normal left ventricular size.The estimated left ventricular ejection fraction is 45%.Mild concentric hypertrophy noted. Normal septal motion. Unable to assess left ventricular diastolic function given patient is in atrial fibrillation.    Wall Scoring: Resting Even with Definity contrast, left ventricular wall motion is difficult to evaluate. Suspicious of inferior basilar hypokinesis on top of mild global hypokinesis.    Right Ventricle: Normal right ventricular size and systolic function. TAPSE is normal, which is consistent with normal right ventricular systolic function. Pacer lead is present in the ventricle.    Left Atrium: Left atrial volume is severely increased.    Tricuspid Valve: Tricuspid valve not well visualized. There is no evidence of tricuspid stenosis. Mild to moderate tricuspid valve regurgitation. The TR peak velocity is over estimated. Right ventricular systolic pressure is likely within normal limits.     Compared to echocardiogram from August 29, 2018, the findings are similar but both echoes are technically challenging.  There is more suspicion of more prominent inferior basilar hypokinesis on the current study.  The TR jet that evaluated the right ventricular systolic pressure, is contaminated and suspect relatively normal right ventricular systolic pressure estimate.    PFTs 2018  FEV1/FVC is 0.56 and is  reduced.  FEV1 is 73% predicted and is reduced.  FVC is 96% predicted and normal.  There was no improvement in spirometry after a single inhaled dose of bronchodilator.  TLC is 99% predicted and is normal.  RV is 113% predicted and is normal.  DLCO is 93% predicted and is normal when it   is corrected for hemoglobin.     Impression:  Full Pulmonary Function Test is abnormal.  PFTs are consistent with mild obstructive disease.  Spirometry is not consistent with reversibility.  There is no hyperinflation.  There is no air-trapping.  Diffusion capacity when corrected for hemoglobin is normal.  Declines in both FEV1 and TLC since 2009 with overall preservation of diffusing capacity.    July 2019  SIX MINUTE WALK TEST / HOME O2 EVALUATION     SpO2 at rest on RA 96%  SpO2 started to drop after 2 1/2 minutes walking. SpO2 after walking 2 1/2 minutes on RA was 88%  SpO2 after walking 6 minutes on RA was 87%  Distance covered 320.04 meters.  Recovery phase, SpO2 after 1 minute rest on RA was 87%  Recovery phase, SpO2 after 2 minute rest on RA was 97%     Impression:   Significant desaturation with activities.   Patient does qualify for O2 supplementation with activity.    Hugh Payton MD (Avi)  [facility]  Pager [phone]  Clinic [phone]

## 2021-06-20 NOTE — ANESTHESIA POSTPROCEDURE EVALUATION
Patient: Buck Sinclair  Structural Aborted Procedure  Anesthesia type: general    Patient location: PACU  Last vitals:   Vitals:    08/30/18 1415   BP: 121/81   Pulse: 63   Resp: 9   Temp:    SpO2: 91%     Post vital signs: stable  Level of consciousness: awake and responds to simple questions  Post-anesthesia pain: pain controlled  Post-anesthesia nausea and vomiting: no  Pulmonary: unassisted, return to baseline  Cardiovascular: stable and blood pressure at baseline  Hydration: adequate  Anesthetic events: no    QCDR Measures:  ASA# 11 - Geovanna-op Cardiac Arrest: ASA11B - Patient did NOT experience unanticipated cardiac arrest  ASA# 12 - Geovanna-op Mortality Rate: ASA12B - Patient did NOT die  ASA# 13 - PACU Re-Intubation Rate: ASA13B - Patient did NOT require a new airway mgmt  ASA# 10 - Composite Anes Safety: ASA10A - No serious adverse event    Additional Notes:

## 2021-06-20 NOTE — ANESTHESIA PROCEDURE NOTES
KANDICE    Patient location during procedure: OR  Start time: 8/30/2018 12:15 PM  Staffing:  Performing  Anesthesiologist: CHONG DEGROOT  KANDICE:  Type/Reason: Diagnostic KANDICE probe placement only  Technique: blind insertion  Difficulty: easy    interpretation completed by Cardiology

## 2021-06-20 NOTE — LETTER
Letter by Flakita Mcrae RN at      Author: Flakita Mcrae RN Service: -- Author Type: --    Filed:  Encounter Date: 4/7/2020 Status: (Other)         Buck Sinclair  [address] [place] [address] [address] [place] [address]      April 7, 2020      Dear Mr. Sinclair,    RE: Remote Results    We are writing to you regarding your recent Remote ICD check from home. Your transmission was received successfully. Battery status is satisfactory at this time.     Your results are showing no significant changes.    Your next device appointment will be a remote check on 7/8/2020; this will occur automatically.    To schedule or reschedule, please call [phone] and press 1.    NOTE: If you would like to do an extra transmission, please call [phone] and press 3 to speak to a nurse BEFORE transmitting. This ensures that the Device Clinic staff is aware of the reason you are sending a transmission, and can follow-up with you after it has been reviewed.    We will be checking your implanted device from home (remotely) every three months unless otherwise instructed. We will need to see you in the clinic at least once a year. You may need to be seen in the clinic sooner depending on the results of your check.    Please be aware:    The follow-up schedule is like a Physician prescription.    Your remote monitor is paired to your specific implanted device.      Sincerely,    [facility]

## 2021-06-20 NOTE — PROGRESS NOTES
Pt previously evaluated/screened for LAAO implant  Previous sheldon done 11/30/17  Pt verbalizes understanding    LAAO device education was completed:     See documented H&P completed by NP in EPIC    Reviewed pre and post op KANDICE/Watchman procedure with pt, pre-procedure letter reviewed and given to pt- see letter in EPIC under documentation, see additional LAAO devic prep note for details on procedure/prep, risks of KANDICE/LAAO discussed, answered pt questions and concerns regarding procedure, time spent with pt was >45min and reviewed available pre-op lab results.    Discussed importance of continuing anticoagulation uninterrupted pre and post LAAO, pt denies missing any doses of their anticoagulant, and pt denies issues with asencio placement in the past.  Instructed patient that they will be contacted post KANDICE for further discussion regarding the Watchman procedure.       MODIFIED SHELDON SCALE   No prior CVA    Score Description  0 No symptoms at all  1 No significant disability despite symptoms; able to carry out all usual duties and activities  2 Slight disability; unable to carry out all previous activities, but able to look after own affairs without assistance  3 Moderate disability; requiring some help, but able to walk without assistance  4 Moderately severe disability; unable to walk without assistance and unable to attend to own bodily needs without assistance  5 Severe disability; bedridden, incontinent and requiring constant nursing care and attention  6 Dead     Total score (0 - 6): 0      Please see additional anticoagulation note for INR/Coumadin dosing.    Contact information reviewed and pt states understanding.

## 2021-06-20 NOTE — LETTER
Letter by Juanita Jim RDCS at      Author: Juanita Jim RDCS Service: -- Author Type: --    Filed:  Encounter Date: 7/20/2020 Status: (Other)         Buck Sinclair  [address] [place] [address] [address] [place] [address]      July 20, 2020      Dear Mr. Sinclair,    RE: Remote Results    We are writing to you regarding your recent Remote ICD check from home. Your transmission was received successfully. Battery status is satisfactory at this time.     Your results are being reviewed.  You will be contacted if further information is necessary.     Your next device appointment will be a clinic visit.  Please call in late August to schedule.      To schedule or reschedule, please call [phone] and press 1.    NOTE: If you would like to do an extra transmission, please call [phone] and press 3 to speak to a nurse BEFORE transmitting. This ensures that the Device Clinic staff is aware of the reason you are sending a transmission, and can follow-up with you after it has been reviewed.    We will be checking your implanted device from home (remotely) every three months unless otherwise instructed. We will need to see you in the clinic at least once a year. You may need to be seen in the clinic sooner depending on the results of your check.    Please be aware:    The follow-up schedule is like a Physician prescription.    Your remote monitor is paired to your specific implanted device.      Sincerely,    [facility]

## 2021-06-20 NOTE — PROGRESS NOTES
Left Atrial Appendage Certification     I have personally reviewed the medical records for Buck Sinclair as it pertains to his candidacy for left atrial appendage occlusion.     The patient has documented atrial fibrillation and is currently on oral anticoagulant therapy (Eliquis).   [id number] - VASc score = 3. The HAS-BLED risk score is 4.   The patients indication to come off anticoagulation: Easy bruising.     The patient has been provided education regarding atrial fibrillation treatment options including the risks and benefits of warfarin, novel oral anticoagulants, and left atrial appendage occlusion.     Summary:    The patient is a good candidate for proceeding with left atrial appendage screening and implant.     Michael Wick

## 2021-06-20 NOTE — PROGRESS NOTES
Pt was no-show.  Failed LAAO implant attempt from 8/30/18.  Per Dr Gong, pt should follow up with his primary cardiologist - Dr Sanders.

## 2021-06-21 NOTE — LETTER
Letter by Monique Lin at      Author: Monique Lin Service: -- Author Type: --    Filed:  Encounter Date: 2/9/2021 Status: (Other)         Buck JONES Yahir  [address] [place] [address] [address] [place] [address]      February 9, 2021      Dear Mr. Sinclair,    RE: Remote Results    We are writing to you regarding your recent Remote ICD check from home. Your transmission was received successfully. Battery status is satisfactory at this time.     Your results are showing no significant changes.    Your next device appointment will be a clinic visit.  Please call in March to schedule.      To schedule or reschedule, please call [phone] and press 1.    NOTE: If you would like to do an extra transmission, please call [phone] and press 3 to speak to a nurse BEFORE transmitting. This ensures that the Device Clinic staff is aware of the reason you are sending a transmission, and can follow-up with you after it has been reviewed.    We will be checking your implanted device from home (remotely) every three months unless otherwise instructed. We will need to see you in the clinic at least once a year. You may need to be seen in the clinic sooner depending on the results of your check.    Please be aware:    The follow-up schedule is like a Physician prescription.    Your remote monitor is paired to your specific implanted device.      Sincerely,    [facility]

## 2021-06-21 NOTE — LETTER
Letter by Hugh Payton MD at      Author: Hugh Payton MD Service: -- Author Type: --    Filed:  Encounter Date: 11/10/2020 Status: (Other)         Vivek Guerrero MD  [address] [place] [id number]                                  November 10, 2020    Patient: Buck Sinclair   MR Number: [id number]   YOB: 1945   Date of Visit: 11/10/2020     Dear Dr. Cesar MD:    Thank you for referring Buck Sinclair to me for evaluation. Below are the relevant portions of my assessment and plan of care.    If you have questions, please do not hesitate to call me. I look forward to following Buck along with you.    Sincerely,        Hugh Payton MD          CC  MD Tata Epps [place], MD  11/10/2020 11:34 AM  Sign when Signing Visit  Pulmonary Clinic Follow-up Visit    Assessment/Plan:  72 year old male with a history of tobacco dependence in remission, CAD s/p PCI/stents, afib s/p PVL with ICD/PPM on anticoagulation, history of cavitary lesion and extensive pneumonia in LLL in Oct 2017, subsequent recurrent LLL pneumonia, since resolved, presenting for follow up. Repeat PFT's today actually showed improved FEV1/FVC ratio, stable FEV1 but substantial worsening of the DLco (almost 40% lower than in 2018). This is consistent with worsening exertional hypoxemia and probably pulmonary vascular disease. He has significant dyspnea on exertion which overall appears stable.      Plan:  #COPD, GOLD class B (CAT >10, few exacerbations/low risk for hospitalization). Minimal smoking history so this is probably due to his work as a . PFTs 2017 showed mild obstruction and normal DLco. Mild exertional hypoxemia noted on 6MWT in 2019. Now on supplemental oxygen as of this year.   - continue budesonide-formoterol 160-4.5 mcg two inhalations BID with spacer; rinse/gargle/spit water after use. No dose changes today at is is still cold out and winter weather significantly  affects his breathing.  - continue tiotropium HandiHaler one inhalation daily  - Cont albuterol as needed  - encouraged exercise, weight loss as able  - continue supplemental O2 for goal O2 sat 88-92%     #exertional hypoxemia, worsening DLco, significant LIMA: suspicion for PVD/pulm HTN since his LIMA seems out of proportion to PFT abnormalities from 2018. Likely due to extensive left-sided heart disease and possible valvular disease as well as hypoxemic lung disease (emphysema). Significant worsening of DLco over the last 2 years. Last echo 2019 did not show markedly abnormal PA pressures but was a limited study. He did have borderline reduced EF of 45%. Will communicate with Dr. Renee about utility of RHC after his next echocardiogram. Unclear if RHC would  at this point as he is not likely to be a vasodilator candidate due to significant underlying lung disease as well as left-heart disease.   - UTD w/ pneumonia and flu vaccines. Rec flu shot every Fall.     #LLL nodule: decreasing in size on 3 month f/u scan after abx. 9mm -> 7mm and associated with scarring  - no further imaging needed unless recurrent symptoms     #Hemoptysis: no further recurrence. Likely was due to the prior LLL pneumonia in the setting of anticoagulation.  - continue INR goal close to 2, as discussed with Dr. Renee   - he'll let me know if he has any concerning symptoms.     Follow up in 6 months.    CCx: follow up of hemoptysis, and COPD GOLD class B    HPI: Interim history: I last saw Buck on 4/15 on a virtual visit. Since that time, he has started oxygen therapy 2Lpm at rest and 3-4Lpm with activity.  Today he reports he's doing about the same.  His pacemaker rate was recently increased to 60Bpm by his EP Dr. Renee. He feels a little better in terms of breathing and stamina with the higher rate.  No cough or fevers.   Using oxygen essentially all the time.  Did not find pulm rehab helpful in the past.     ROS:  A  12-system review was obtained and was negative with the exception of the symptoms endorsed in the history of present illness.    PMH:  Past Medical History:   Diagnosis Date   ? Anemia    ? BPH (benign prostatic hyperplasia)    ? COPD, group B, by GOLD 2017 classification (H)    ? Coronary artery disease due to calcified coronary lesion    ? Dyslipidemia, goal LDL below 70    ? Essential hypertension    ? History of cardiomyopathy    ? History of transfusion    ? Persistent atrial fibrillation (H)    ? Pneumonia of left lower lobe due to infectious organism 10/4/2017   ? Skin cancer of trunk    ? Status post catheter ablation of atrial fibrillation 6/7/2017    PVI 4-2011 (Cryo/PVI + roof line + CTI line) Re-do PVI 7-2011 (RFA/PVI + CFE + VIDYA + confirmed CTI line)   ? Ventricular tachycardia (H)        PSH:  Past Surgical History:   Procedure Laterality Date   ? CARDIAC DEFIBRILLATOR PLACEMENT     ? CARDIOVERSION  07/11/2018    x20, last 2/12/15, 10/2015, 11/18/16, 6/16/17 by Lauren Foster CNP   ? CARDIOVERSION  07/11/2018   ? COLONOSCOPY N/A 4/28/2017    Procedure: COLONOSCOPY with 2 ascending polyps and 1 transverse polyp;  Surgeon: Jose Whittington MD;  Location: [facility] GI;  Service:    ? CV CORONARY ANGIOGRAM N/A 9/20/2017    Procedure: Coronary Angiogram;  Surgeon: Sergio Cervantes MD;  Location: [facility] Cath Lab;  Service:    ? EP ICD INSERT     ? FRACTURE SURGERY Left     wrist   ? INGUINAL HERNIA REPAIR Left 1967    while in the [employer] in [place] after 13 month in [place]   ? INSERT / REPLACE / REMOVE PACEMAKER     ? left hand surgery---tendon repair     ? MA ABLATE HEART DYSRHYTHM FOCUS  04/2011    Catheter Ablation Atrial Fibrillation PVI Apr 2011 (Cryo+RF-PVI + roof line + CTI line)   ? MA ABLATE HEART DYSRHYTHM FOCUS  07/2011    Re-do PVI Jul 2011 (RFA-PVI + CFE + VIDYA + confirmation of CTI line)   ? MA MYERS W/O FACETEC FORAMOT/DSKC 1/2 VRT SEG, CERVICAL      Laminectomy Lumbar;  Recorded: 03/09/2012;    ? TOTAL SHOULDER REPLACEMENT Right 2016    Dr. Abernathy of [facility]       Allergies:  Allergies   Allergen Reactions   ? Adhesive Other (See Comments)     ADHESIVE TAPE; SKIN IRRITATION  Foam tape:  Skin removed with tape removal   ? Amiodarone      ADVERSE REACTION.  Sunlight sensitivity.   ? Lisinopril Cough       Family HX:  Family History   Problem Relation Age of Onset   ? Cancer Mother         leukemia   ? Cancer Father         bladder   ? Cancer Sister         breast with lung met.   ? Aneurysm Sister    ? CABG Brother    ? CABG Brother    ? Valvular heart disease Brother         valve replacement       Social Hx:  Social History     Socioeconomic History   ? Marital status:      Spouse name: Neela   ? Number of children: Not on file   ? Years of education: 12   ? Highest education level: Not on file   Occupational History   ? Occupation:      Employer: RETIRED   ? Occupation: [employer]     Comment: [employer]   Social Needs   ? Financial resource strain: Not on file   ? Food insecurity     Worry: Not on file     Inability: Not on file   ? Transportation needs     Medical: Not on file     Non-medical: Not on file   Tobacco Use   ? Smoking status: Former Smoker     Packs/day: 1.00     Years: 4.00     Pack years: 4.00     Types: Cigarettes     Quit date: 1968     Years since quittin.8   ? Smokeless tobacco: Never Used   Substance and Sexual Activity   ? Alcohol use: Yes     Alcohol/week: 2.0 standard drinks     Types: 2 Cans of beer per week     Comment: 1 beer per week   ? Drug use: No   ? Sexual activity: Yes     Partners: Female     Birth control/protection: Post-menopausal   Lifestyle   ? Physical activity     Days per week: Not on file     Minutes per session: Not on file   ? Stress: Not on file   Relationships   ? Social connections     Talks on phone: Not on file     Gets together: Not on file     Attends Rastafari service: Not on file     Active  member of club or organization: Not on file     Attends meetings of clubs or organizations: Not on file     Relationship status: Not on file   ? Intimate partner violence     Fear of current or ex partner: Not on file     Emotionally abused: Not on file     Physically abused: Not on file     Forced sexual activity: Not on file   Other Topics Concern   ? Not on file   Social History Narrative   ? Not on file       Current Meds:  Current Outpatient Medications   Medication Sig Dispense Refill   ? ACETAMINOPHEN (TYLENOL ORAL) Take 1,000 mg by mouth 2 (two) times a day.      ? albuterol (PROAIR HFA;PROVENTIL HFA;VENTOLIN HFA) 90 mcg/actuation inhaler Inhale 2 puffs every 6 (six) hours as needed for wheezing. 1 Inhaler 11   ? amoxicillin (AMOXIL) 500 MG tablet Take prior to the Dentist     ? ascorbic acid (VITAMIN C) 1000 MG tablet Take 1,000 mg by mouth daily.     ? atorvastatin (LIPITOR) 40 MG tablet Take 40 mg by mouth at bedtime.     ? budesonide-formoterol (SYMBICORT) 160-4.5 mcg/actuation inhaler Inhale 2 puffs 2 (two) times a day.     ? co-enzyme Q-10 30 mg capsule Take 100 mg by mouth daily.     ? furosemide (LASIX) 20 MG tablet TAKE 1 TABLET(20 MG) BY MOUTH DAILY 90 tablet 1   ? losartan (COZAAR) 50 MG tablet TAKE 1 TABLET BY MOUTH EVERY DAY 90 tablet 2   ? MAG 64 64 mg TbEC delayed-release tablet TAKE 1 TABLET BY MOUTH TWICE DAILY 180 tablet 0   ? multivitamin (MULTIVITAMIN) per tablet Take 1 tablet by mouth daily.     ? omega 3-dha-epa-fish oil (FISH OIL) 1,000 mg (120 mg-180 mg) cap Take 2 tablets by mouth 2 (two) times a day.      ? OXYGEN-AIR DELIVERY SYSTEMS MISC Use As Directed. 2 L at rest/night and 3-4L with activity  [employer]     ? polyethylene glycol (MIRALAX) 17 gram packet Take 17 g by mouth daily.     ? sotaloL (BETAPACE) 80 MG tablet TAKE 2 TABLETS(160 MG) BY MOUTH TWICE DAILY. 360 tablet 0   ? tiotropium (SPIRIVA) 18 mcg inhalation capsule Place 18 mcg into inhaler and inhale daily.     ? VITAMIN D2  "50,000 unit capsule Take 50,000 Units by mouth 2 (two) times a week. SUN and WED  3   ? warfarin ANTICOAGULANT (COUMADIN/JANTOVEN) 2.5 MG tablet Takes 1 tablet (2.5mg) to 2 tablets (5mg) by mouth daily, as directed.  Adjust dose based on INR results. 110 tablet 1     No current facility-administered medications for this visit.        Physical Exam:  /76   Pulse 63   Ht 6' 3\" (1.905 m)   Wt (!) 275 lb (124.7 kg) Comment: per pt  SpO2 97% Comment: 2L pulse dose  BMI 34.37 kg/m    Gen: alert, oriented, no distress  HEENT: nasal turbinates are unremarkable, no oropharyngeal lesions, no cervical or supraclavicular lymphadenopathy  CV: RRR, no M/G/R  Resp: CTAB, no focal crackles or wheezes  Abd: soft, nontender, no palpable organomegaly  Skin: no apparent rashes  Ext: no cyanosis, clubbing or edema  Neuro: alert, nonfocal    Labs:  Reviewed  Dec 2018  Chem panel wnl  TSH wnl  hgb 12.1 Oct 2018    Previous testing  DONNA neg  blasto neg  Histo testing neg  c-ANCA neg  HIV neg  RF neg    Bronch/LLL BAL cultures neg      Imaging studies:  CT chest April 2018  IMPRESSION:   CONCLUSION:  1.  Mild scarring and slight bronchiectasis present at both lung bases. No active pneumonia identified.    CT chest 7/15/2019  IMPRESSION:   CONCLUSION:   1.  Nodular opacity of the left lower lobe of interest on 04/17/2019 is less conspicuous today and probably explained by scarring. Additional 6 month chest CT follow-up is recommended.  2.  Emphysema and scattered areas of scarring elsewhere in both lungs.  3.  Advanced multivessel coronary artery disease.      Left Ventricle: Normal left ventricular size.The estimated left ventricular ejection fraction is 45%.Mild concentric hypertrophy noted. Normal septal motion. Unable to assess left ventricular diastolic function given patient is in atrial fibrillation.    Wall Scoring: Resting Even with Definity contrast, left ventricular wall motion is difficult to evaluate. Suspicious of " inferior basilar hypokinesis on top of mild global hypokinesis.    Right Ventricle: Normal right ventricular size and systolic function. TAPSE is normal, which is consistent with normal right ventricular systolic function. Pacer lead is present in the ventricle.    Left Atrium: Left atrial volume is severely increased.    Tricuspid Valve: Tricuspid valve not well visualized. There is no evidence of tricuspid stenosis. Mild to moderate tricuspid valve regurgitation. The TR peak velocity is over estimated. Right ventricular systolic pressure is likely within normal limits.     Compared to echocardiogram from August 29, 2018, the findings are similar but both echoes are technically challenging.  There is more suspicion of more prominent inferior basilar hypokinesis on the current study.  The TR jet that evaluated the right ventricular systolic pressure, is contaminated and suspect relatively normal right ventricular systolic pressure estimate.    PFTs 2018  FEV1/FVC is 0.56 and is reduced.  FEV1 is 73% predicted and is reduced.  FVC is 96% predicted and normal.  There was no improvement in spirometry after a single inhaled dose of bronchodilator.  TLC is 99% predicted and is normal.  RV is 113% predicted and is normal.  DLCO is 93% predicted and is normal when it   is corrected for hemoglobin.     Impression:  Full Pulmonary Function Test is abnormal.  PFTs are consistent with mild obstructive disease.  Spirometry is not consistent with reversibility.  There is no hyperinflation.  There is no air-trapping.  Diffusion capacity when corrected for hemoglobin is normal.  Declines in both FEV1 and TLC since 2009 with overall preservation of diffusing capacity.    PFTs 11/10/2029  FEV1 2.42L 68%  FVC 76%  No BD response  Ratio 0.66 (LLN 0.6)  DLco 46% han for hgb  Flow vol curve suggests obstruction.  Impression: no obstruction based on ratio >LLN but FV curve does suggest some obstruction. Significant decline in DLco  compared to DLco.     July 2019  SIX MINUTE WALK TEST / HOME O2 EVALUATION     SpO2 at rest on RA 96%  SpO2 started to drop after 2 1/2 minutes walking. SpO2 after walking 2 1/2 minutes on RA was 88%  SpO2 after walking 6 minutes on RA was 87%  Distance covered 320.04 meters.  Recovery phase, SpO2 after 1 minute rest on RA was 87%  Recovery phase, SpO2 after 2 minute rest on RA was 97%     Impression:   Significant desaturation with activities.   Patient does qualify for O2 supplementation with activity.    Hugh (Woodrow Payton MD  [facility]monary [facility]  Pager [phone]  Clinic [phone]

## 2021-06-21 NOTE — LETTER
Letter by Val Angeles RN at      Author: Val Angeles RN Service: -- Author Type: --    Filed:  Encounter Date: 12/17/2020 Status: (Other)         Buck Sinclair  [address] [place] [address] Dr [place] [address]             December 17, 2020         Dear  Yahir,    Below are the results from your recent visit:    Resulted Orders   Basic metabolic panel   Result Value Ref Range    Sodium 142 136 - 145 mmol/L    Potassium 5.0 3.5 - 5.0 mmol/L    Chloride 111 (H) 98 - 107 mmol/L    CO2 19 (L) 22 - 31 mmol/L    Anion Gap, Calculation 12 5 - 18 mmol/L    Glucose 103 70 - 125 mg/dL    Calcium 9.1 8.5 - 10.5 mg/dL    BUN 37 (H) 8 - 28 mg/dL    Creatinine 1.36 (H) 0.70 - 1.30 mg/dL    GFR MDRD Af Amer >60 >60 mL/min/1.73m2    GFR MDRD Non Af Amer 51 (L) >60 mL/min/1.73m2    Narrative    Fasting Glucose reference range is 70-99 mg/dL per  American Diabetes Association (ADA) guidelines.     Your recent test results are listed above.  Dr Renee has reviewed the results, they are stable, and at this time would not like to make any further changes.    If you have any questions or concerns, please don't hesitate to call.    Sincerely,    Val Angeles RN  [phone]

## 2021-06-21 NOTE — PROGRESS NOTES
Pulmonary Clinic Follow-up Visit    Assessment/Plan:  72 year old male with a history of tobacco dependence in remission, CAD s/p PCI/stents, afib s/p PVL with ICD/PPM on anticoagulation, history of cavitary lesion and extensive pneumonia in LLL in Oct 2017, subsequent recurrent LLL pneumonia, now presenting for follow up of above and his COPD. He had hemoptysis this morning that prompted an ER visit. Suspect the hemoptysis is in the setting of bronchitis, with LLL bronchiectatic area that is prone to bleeding, especially in the setting of coumadin use. CXR, lung exam and vitals today are reassuring.     Other than above he seems to be doing well from COPD perspective, stable on current inhaler regimen with good benefit from pulmonary rehab.    Plan:  - continue budesonide-formoterol 160-4.5 mcg two inhalations BID WITH SPACER; rinse/gargle/spit water after use. No changes today given possible bronchitis.  - take the doxy for 10 days as prescribed  - we will follow up with him on Monday to see how he's doing. If still having hemoptysis, will consider CT scan of the chest and possibly bronchoscopy. If he has a lot of hemoptysis or significant dyspnea I instructed him to go to the ER.   - continue tiotropium HandiHaler one inhalation daily  - Cont albuterol as needed  - encouraged exercise, weight loss as able  - UTD w/ pneumonia vaccinations. Will give flu shot today.    Follow up in 6 months. He will call in the interim if he has any questions/concerns.    CCx: follow up of hemoptysis, cavitary pneumonia (resolved) and COPD GOLD class D, hemoptysis this morning.    HPI: Interim history: I last saw Buck on 4/11/2018. Since our last visit, he reports this morning he had hemoptysis, about a kleenex sized amount. He went to the ER. He had  CXR that was unremarkable. He was Rx'd doxycycline for 10 days. He had some trouble breathing early this AM, but this has resolved. He does report an episode of hemoptysis that  resolved a few weeks ago. He has not had any further hemoptysis since 2am this morning. He denies fevers, chills, changes in weight.     ROS:  A 12-system review was obtained and was negative with the exception of the symptoms endorsed in the history of present illness.    PMH:  Past Medical History:   Diagnosis Date     Anemia      BPH (benign prostatic hyperplasia)      COPD (chronic obstructive pulmonary disease) (H)      Coronary artery disease due to calcified coronary lesion      Dyslipidemia, goal LDL below 70      Essential hypertension      History of cardiomyopathy      History of transfusion      Persistent atrial fibrillation (H)      Pneumonia of left lower lobe due to infectious organism (H) 10/4/2017     Skin cancer of trunk      Status post catheter ablation of atrial fibrillation 6/7/2017    PVI 4-2011 (Cryo/PVI + roof line + CTI line) Re-do PVI 7-2011 (RFA/PVI + CFE + VIDYA + confirmed CTI line)     Ventricular tachycardia (H)        PSH:  Past Surgical History:   Procedure Laterality Date     CARDIAC DEFIBRILLATOR PLACEMENT       CARDIOVERSION  07/11/2018    x20, last 2/12/15, 10/2015, 11/18/16, 6/16/17 by Lauren Foster CNP     CARDIOVERSION  07/11/2018     COLONOSCOPY N/A 4/28/2017    Procedure: COLONOSCOPY with 2 ascending polyps and 1 transverse polyp;  Surgeon: Jose Whittington MD;  Location: [facility] GI;  Service:      CV CORONARY ANGIOGRAM N/A 9/20/2017    Procedure: Coronary Angiogram;  Surgeon: Sergio Cervantes MD;  Location: [facility] Cath Lab;  Service:      EP ICD INSERT       FRACTURE SURGERY Left     wrist     INGUINAL HERNIA REPAIR Left 1967    while in the [employer] in [place] after 13 month in [place]     INSERT / REPLACE / REMOVE PACEMAKER       left hand surgery---tendon repair       ME ABLATE HEART DYSRHYTHM FOCUS  04/2011    Catheter Ablation Atrial Fibrillation PVI Apr 2011 (Cryo+RF-PVI + roof line + CTI line)     ME ABLATE HEART DYSRHYTHM FOCUS  07/2011    Re-do PVI Jul 2011  (RFA-PVI + CFE + VIDYA + confirmation of CTI line)     [place] W/O FACETEC FORAMOT/DSKC 1/2 VRT SEG, CERVICAL      Laminectomy Lumbar;  Recorded: 03/09/2012;     TOTAL SHOULDER REPLACEMENT Right 03/03/2016    Dr. Abernathy of [facility]       Allergies:  Allergies   Allergen Reactions     Adhesive Other (See Comments)     ADHESIVE TAPE; SKIN IRRITATION  Foam tape:  Skin removed with tape removal     Amiodarone      ADVERSE REACTION.  Sunlight sensitivity.     Lisinopril Cough       Family HX:  Family History   Problem Relation Age of Onset     Cancer Mother      leukemia     Cancer Father      bladder     Cancer Sister      breast with lung met.     Aneurysm Sister      CABG Brother      CABG Brother      Valvular heart disease Brother      valve replacement       Social Hx:  Social History     Social History     Marital status:      Spouse name: Neela     Number of children: N/A     Years of education: 12     Occupational History      Retired     [employer]     Social History Main Topics     Smoking status: Former Smoker     Packs/day: 0.50     Years: 4.00     Types: Cigarettes     Quit date: 1/1/1968     Smokeless tobacco: Never Used     Alcohol use 1.2 oz/week     2 Cans of beer per week      Comment: 1 beer per week     Drug use: No     Sexual activity: Yes     Partners: Female     Birth control/ protection: Post-menopausal     Other Topics Concern     Not on file     Social History Narrative       Current Meds:  Current Outpatient Prescriptions   Medication Sig Dispense Refill     ACETAMINOPHEN (TYLENOL ORAL) Take 1,000 mg by mouth 2 (two) times a day.        albuterol (PROAIR HFA;PROVENTIL HFA;VENTOLIN HFA) 90 mcg/actuation inhaler Inhale 2 puffs every 6 (six) hours as needed for wheezing. 1 Inhaler 11     amoxicillin (AMOXIL) 500 MG tablet Take prior to the Dentist       ascorbic acid (VITAMIN C) 1000 MG tablet Take 1,000 mg by mouth daily.       aspirin 81 MG EC  tablet Take 1 tablet (81 mg total) by mouth daily. 30 tablet 11     atorvastatin (LIPITOR) 40 MG tablet Take 40 mg by mouth at bedtime.       budesonide-formoterol (SYMBICORT) 160-4.5 mcg/actuation inhaler Inhale 2 puffs 2 (two) times a day.       co-enzyme Q-10 30 mg capsule Take 100 mg by mouth daily.       doxycycline (VIBRAMYCIN) 100 MG capsule Take 1 capsule (100 mg total) by mouth 2 (two) times a day for 10 days. 20 capsule 0     furosemide (LASIX) 20 MG tablet TAKE 1 TABLET(20 MG) BY MOUTH DAILY 90 tablet 3     losartan (COZAAR) 50 MG tablet Take 1 tablet (50 mg total) by mouth daily. 90 tablet 1     magnesium chloride (MAG 64) 64 mg TbEC delayed-release tablet Take 1 tablet (64 mg total) by mouth 2 (two) times a day. 180 tablet 5     multivitamin (MULTIVITAMIN) per tablet Take 1 tablet by mouth daily.       omega 3-dha-epa-fish oil (FISH OIL) 1,000 mg (120 mg-180 mg) cap Take 2 tablets by mouth 2 (two) times a day.        polyethylene glycol (MIRALAX) 17 gram packet Take 17 g by mouth daily.       sotalol (BETAPACE) 160 MG tablet Take 1 tablet (160 mg total) by mouth every 12 (twelve) hours.  0     tiotropium (SPIRIVA) 18 mcg inhalation capsule Place 18 mcg into inhaler and inhale daily.       vardenafil (LEVITRA) 10 MG tablet Take 10 mg by mouth daily as needed for erectile dysfunction.       VITAMIN D2 50,000 unit capsule Take 50,000 Units by mouth 2 (two) times a week. SUN and WED  3     warfarin (COUMADIN/JANTOVEN) 2.5 MG tablet TAKE 5 MG TABLETS BY MOUTH M W F AND 2.5 MG TABLETS ALL OTHER DAYS. 90 tablet 0     No current facility-administered medications for this visit.        Physical Exam:  There were no vitals taken for this visit.  Gen: alert, oriented, no distress  HEENT: nasal turbinates are unremarkable, no oropharyngeal lesions, no cervical or supraclavicular lymphadenopathy  CV: RRR, no M/G/R  Resp: CTAB, no focal crackles or wheezes  Abd: soft, nontender, no palpable organomegaly  Skin: no  apparent rashes  Ext: no cyanosis, clubbing or edema  Neuro: alert, nonfocal    Labs:  reviewed    Imaging studies:  CT chest April 2018  IMPRESSION:   CONCLUSION:  1.  Mild scarring and slight bronchiectasis present at both lung bases. No active pneumonia identified.    PFTs   FEV1/FVC is 0.56 and is reduced.  FEV1 is 73% predicted and is reduced.  FVC is 96% predicted and normal.  There was no improvement in spirometry after a single inhaled dose of bronchodilator.  TLC is 99% predicted and is normal.  RV is 113% predicted and is normal.  DLCO is 93% predicted and is normal when it   is corrected for hemoglobin.     Impression:  Full Pulmonary Function Test is abnormal.  PFTs are consistent with mild obstructive disease.  Spirometry is not consistent with reversibility.  There is no hyperinflation.  There is no air-trapping.  Diffusion capacity when corrected for hemoglobin is normal.  Declines in both FEV1 and TLC since 2009 with overall preservation of diffusing capacity.    Hugh Payton MD (Avi)  [facility] Pulmonary & Critical Care  Pager [phone]  Clinic [phone]

## 2021-06-21 NOTE — LETTER
Letter by Hugh Payton MD at      Author: Hugh Payton MD Service: -- Author Type: --    Filed:  Encounter Date: 5/10/2021 Status: (Other)         Vivek Guerrero MD  [address] [place] [id number]                                  May 10, 2021    Patient: Buck Sinclair   MR Number: [id number]   YOB: 1945   Date of Visit: 5/10/2021     Dear Dr. Cesar MD:    Thank you for referring Buck Sinclair to me for evaluation. Below are the relevant portions of my assessment and plan of care.    If you have questions, please do not hesitate to call me. I look forward to following Buck along with you.    Sincerely,        Hugh Payton MD          CC  No Recipients  Hugh Payton MD  5/10/2021 10:22 AM  Sign when Signing Visit  Pulmonary Clinic Follow-up Visit    Assessment/Plan:  72 year old male with a history of tobacco dependence in remission, CAD s/p PCI/stents, afib s/p PVL with ICD/PPM on anticoagulation, history of cavitary lesion and extensive pneumonia in LLL in Oct 2017, subsequent recurrent LLL pneumonia, since resolved, presenting for follow up. Repeat PFT's in 2020 actually showed improved FEV1/FVC ratio, stable FEV1 but substantial worsening of the DLco (almost 40% lower than in 2018). This is consistent with worsening exertional hypoxemia and probably pulmonary vascular disease. Overall appears to be fairly stable.      Plan:  #COPD, GOLD class B (CAT >10, few exacerbations/low risk for hospitalization). Minimal smoking history so this is probably due to his work as a . PFTs 2017 showed mild obstruction and normal DLco. Mild exertional hypoxemia noted on 6MWT in 2019. Now on supplemental oxygen as of 2020.  - continue budesonide-formoterol 160-4.5 mcg two inhalations BID with spacer; rinse/gargle/spit water after use. No dose changes today.  - continue tiotropium HandiHaler one inhalation daily  - Cont albuterol as needed  - encouraged exercise,  weight loss as able  - continue supplemental O2 for goal O2 sat 88-92%   Due to desaturation of SpO2 less than or equal to 88% on room air at rest from COPD, home oxygen therapy will benefit my patient's condition. The patient has tried other medications including inhaled and nebulized therapy and steroids with limited success and oxygen is still required. The patient is mobile in the home and requires portability.  - recommended another round of pulm rehab at [facility], once at [facility]. He is going to think about it.  - no indication for LDCT as his smoking history is too minimal.   - UTD w/ pneumonia and flu vaccines. Rec flu shot every Fall. UTD with covid-19 vaccination as well.     #exertional hypoxemia, worsening DLco, significant LIMA: suspicion for PVD/pulm HTN since his LIMA seems out of proportion to PFT abnormalities from 2018. Likely due to extensive left-sided heart disease and possible valvular disease as well as hypoxemic lung disease (emphysema). Significant worsening of DLco over the last 2 years. Last echo 2019 did not show markedly abnormal PA pressures but was a limited study. He did have borderline reduced EF of 45%. heart disease stable per patient.      #LLL nodule: decreasing in size on 3 month f/u scan after abx. 9mm -> 7mm and associated with scarring  - no further imaging needed unless recurrent symptoms     #Hemoptysis: no further recurrence. Likely was due to the prior LLL pneumonia in the setting of anticoagulation.  - continue INR goal close to 2, as discussed with Dr. Renee   - he'll let me know if he has any concerning symptoms.    #Suspected nocturnal hypoxemia:  - overnight oximetry ordered.     Follow up in 6 months.    CCx: follow up of hemoptysis, and COPD GOLD class B    HPI: Interim history: I last saw Buck on 11/10/2020. Since that time, he reports he's doing fairly well. Stamina, dyspnea about the same. He is doing well with oxygen. But not using O2 at night due to pulse  dose, unable to tolerate this at night.     ROS:  A 12-system review was obtained and was negative with the exception of the symptoms endorsed in the history of present illness.    PMH:  Past Medical History:   Diagnosis Date   ? Anemia    ? Asthma without status asthmaticus 5/5/2021   ? BPH (benign prostatic hyperplasia)    ? COPD, group B, by GOLD 2017 classification (H)    ? Coronary artery disease due to calcified coronary lesion    ? Dyslipidemia, goal LDL below 70    ? Essential hypertension    ? History of cardiomyopathy    ? History of transfusion    ? Persistent atrial fibrillation (H)    ? Pneumonia of left lower lobe due to infectious organism 10/4/2017   ? Skin cancer of trunk    ? Status post catheter ablation of atrial fibrillation 6/7/2017    PVI 4-2011 (Cryo/PVI + roof line + CTI line) Re-do PVI 7-2011 (RFA/PVI + CFE + VIDYA + confirmed CTI line)   ? Ventricular tachycardia (H)        PSH:  Past Surgical History:   Procedure Laterality Date   ? CARDIAC DEFIBRILLATOR PLACEMENT     ? CARDIOVERSION  07/11/2018    x20, last 2/12/15, 10/2015, 11/18/16, 6/16/17 by Lauren Foster CNP   ? CARDIOVERSION  07/11/2018   ? COLONOSCOPY N/A 4/28/2017    Procedure: COLONOSCOPY with 2 ascending polyps and 1 transverse polyp;  Surgeon: Jose Whittington MD;  Location: [facility] GI;  Service:    ? CV CORONARY ANGIOGRAM N/A 9/20/2017    Procedure: Coronary Angiogram;  Surgeon: Sergio Cervantes MD;  Location: [facility] Lab;  Service:    ? EP ICD INSERT     ? FRACTURE SURGERY Left     wrist   ? INGUINAL HERNIA REPAIR Left 1967    while in the [employer] in [place] after 13 month in [place]   ? INSERT / REPLACE / REMOVE PACEMAKER     ? left hand surgery---tendon repair     ? [place] ABLATE HEART DYSRHYTHM FOCUS  04/2011    Catheter Ablation Atrial Fibrillation PVI Apr 2011 (Cryo+RF-PVI + roof line + CTI line)   ? PA ABLATE HEART DYSRHYTHM FOCUS  07/2011    Re-do PVI Jul 2011 (RFA-PVI + CFE + VIDYA + confirmation of CTI line)   ? PA  [place] W/O FACETEC FORAMOT/DSKC  VRT SEG, CERVICAL      Laminectomy Lumbar;  Recorded: 2012;   ? TOTAL SHOULDER REPLACEMENT Right 2016    Dr. Abernathy of [facility]       Allergies:  Allergies   Allergen Reactions   ? Adhesive Other (See Comments)     ADHESIVE TAPE; SKIN IRRITATION  Foam tape:  Skin removed with tape removal   ? Amiodarone      ADVERSE REACTION.  Sunlight sensitivity.   ? Lisinopril Cough       Family HX:  Family History   Problem Relation Age of Onset   ? Cancer Mother         leukemia   ? Cancer Father         bladder   ? Cancer Sister         breast with lung met.   ? Aneurysm Sister    ? CABG Brother    ? CABG Brother    ? Valvular heart disease Brother         valve replacement       Social Hx:  Social History     Socioeconomic History   ? Marital status:      Spouse name: Neela   ? Number of children: Not on file   ? Years of education: 12   ? Highest education level: Not on file   Occupational History   ? Occupation:      Employer: RETIRED   ? Occupation: [place] [employer]     Comment: [employer]   Social Needs   ? Financial resource strain: Not on file   ? Food insecurity     Worry: Not on file     Inability: Not on file   ? Transportation needs     Medical: Not on file     Non-medical: Not on file   Tobacco Use   ? Smoking status: Former Smoker     Packs/day: 1.00     Years: 4.00     Pack years: 4.00     Types: Cigarettes     Quit date: 1968     Years since quittin.3   ? Smokeless tobacco: Never Used   Substance and Sexual Activity   ? Alcohol use: Yes     Alcohol/week: 2.0 standard drinks     Types: 2 Cans of beer per week     Comment: 1 beer per week   ? Drug use: No   ? Sexual activity: Yes     Partners: Female     Birth control/protection: Post-menopausal   Lifestyle   ? Physical activity     Days per week: Not on file     Minutes per session: Not on file   ? Stress: Not on file   Relationships   ? Social connections     Talks on phone:  Not on file     Gets together: Not on file     Attends Islam service: Not on file     Active member of club or organization: Not on file     Attends meetings of clubs or organizations: Not on file     Relationship status: Not on file   ? Intimate partner violence     Fear of current or ex partner: Not on file     Emotionally abused: Not on file     Physically abused: Not on file     Forced sexual activity: Not on file   Other Topics Concern   ? Not on file   Social History Narrative   ? Not on file       Current Meds:  Current Outpatient Medications   Medication Sig Dispense Refill   ? ACETAMINOPHEN (TYLENOL ORAL) Take 1,000 mg by mouth 2 (two) times a day.      ? albuterol (PROAIR HFA;PROVENTIL HFA;VENTOLIN HFA) 90 mcg/actuation inhaler Inhale 2 puffs every 6 (six) hours as needed for wheezing. 1 Inhaler 11   ? amoxicillin (AMOXIL) 500 MG tablet Take prior to the Dentist     ? ascorbic acid (VITAMIN C) 1000 MG tablet Take 1,000 mg by mouth daily.     ? atorvastatin (LIPITOR) 40 MG tablet Take 40 mg by mouth at bedtime.     ? budesonide-formoterol (SYMBICORT) 160-4.5 mcg/actuation inhaler Inhale 2 puffs 2 (two) times a day.     ? co-enzyme Q-10 30 mg capsule Take 100 mg by mouth daily.     ? furosemide (LASIX) 40 MG tablet Take 1 tablet (40 mg total) by mouth daily. 90 tablet 3   ? losartan (COZAAR) 50 MG tablet Take 1 tablet (50 mg total) by mouth daily. 90 tablet 1   ? MAG 64 64 mg TbEC delayed-release tablet TAKE 1 TABLET(64 MG) BY MOUTH TWICE DAILY 180 tablet 1   ? multivitamin (MULTIVITAMIN) per tablet Take 1 tablet by mouth daily.     ? omega 3-dha-epa-fish oil (FISH OIL) 1,000 mg (120 mg-180 mg) cap Take 2 tablets by mouth 2 (two) times a day.      ? OXYGEN-AIR DELIVERY SYSTEMS MISC Use As Directed. 2 L at rest/night and 3-4L with activity  [employer]     ? polyethylene glycol (MIRALAX) 17 gram packet Take 17 g by mouth daily.     ? sotaloL (BETAPACE) 80 MG tablet TAKE 2 TABLETS(160 MG) BY MOUTH TWICE DAILY.  360 tablet 0   ? tiotropium (SPIRIVA) 18 mcg inhalation capsule Place 18 mcg into inhaler and inhale daily.     ? VITAMIN D2 50,000 unit capsule Take 50,000 Units by mouth 2 (two) times a week. SUN and WED  3   ? warfarin ANTICOAGULANT (COUMADIN/JANTOVEN) 2.5 MG tablet Takes 1 tablet (2.5mg) to 2 tablets (5mg) by mouth daily, as directed.  Adjust dose based on INR results. 130 tablet 1     No current facility-administered medications for this visit.        Physical Exam:  There were no vitals taken for this visit.  Gen: alert, oriented, no distress  HEENT: nasal turbinates are unremarkable, no oropharyngeal lesions, no cervical or supraclavicular lymphadenopathy  CV: RRR, no M/G/R  Resp: CTAB, no focal crackles or wheezes  Abd: soft, nontender, no palpable organomegaly  Skin: no apparent rashes  Ext: no cyanosis, clubbing or edema  Neuro: alert, nonfocal    Labs:  Reviewed  Dec 2018  Chem panel wnl  TSH wnl  hgb 12.1 Oct 2018    Previous testing  DONNA neg  blasto neg  Histo testing neg  c-ANCA neg  HIV neg  RF neg    Bronch/LLL BAL cultures neg      Imaging studies:  CT chest April 2018  IMPRESSION:   CONCLUSION:  1.  Mild scarring and slight bronchiectasis present at both lung bases. No active pneumonia identified.    CT chest 7/15/2019  IMPRESSION:   CONCLUSION:   1.  Nodular opacity of the left lower lobe of interest on 04/17/2019 is less conspicuous today and probably explained by scarring. Additional 6 month chest CT follow-up is recommended.  2.  Emphysema and scattered areas of scarring elsewhere in both lungs.  3.  Advanced multivessel coronary artery disease.      Left Ventricle: Normal left ventricular size.The estimated left ventricular ejection fraction is 45%.Mild concentric hypertrophy noted. Normal septal motion. Unable to assess left ventricular diastolic function given patient is in atrial fibrillation.    Wall Scoring: Resting Even with Definity contrast, left ventricular wall motion is difficult to  evaluate. Suspicious of inferior basilar hypokinesis on top of mild global hypokinesis.    Right Ventricle: Normal right ventricular size and systolic function. TAPSE is normal, which is consistent with normal right ventricular systolic function. Pacer lead is present in the ventricle.    Left Atrium: Left atrial volume is severely increased.    Tricuspid Valve: Tricuspid valve not well visualized. There is no evidence of tricuspid stenosis. Mild to moderate tricuspid valve regurgitation. The TR peak velocity is over estimated. Right ventricular systolic pressure is likely within normal limits.     Compared to echocardiogram from August 29, 2018, the findings are similar but both echoes are technically challenging.  There is more suspicion of more prominent inferior basilar hypokinesis on the current study.  The TR jet that evaluated the right ventricular systolic pressure, is contaminated and suspect relatively normal right ventricular systolic pressure estimate.    PFTs 2018  FEV1/FVC is 0.56 and is reduced.  FEV1 is 73% predicted and is reduced.  FVC is 96% predicted and normal.  There was no improvement in spirometry after a single inhaled dose of bronchodilator.  TLC is 99% predicted and is normal.  RV is 113% predicted and is normal.  DLCO is 93% predicted and is normal when it   is corrected for hemoglobin.     Impression:  Full Pulmonary Function Test is abnormal.  PFTs are consistent with mild obstructive disease.  Spirometry is not consistent with reversibility.  There is no hyperinflation.  There is no air-trapping.  Diffusion capacity when corrected for hemoglobin is normal.  Declines in both FEV1 and TLC since 2009 with overall preservation of diffusing capacity.    PFTs 11/10/2029  FEV1 2.42L 68%  FVC 76%  No BD response  Ratio 0.66 (LLN 0.6)  DLco 46% han for hgb  Flow vol curve suggests obstruction.  Impression: no obstruction based on ratio >LLN but FV curve does suggest some obstruction.  Significant decline in DLco compared to DLco.     July 2019  SIX MINUTE WALK TEST / HOME O2 EVALUATION     SpO2 at rest on RA 96%  SpO2 started to drop after 2 1/2 minutes walking. SpO2 after walking 2 1/2 minutes on RA was 88%  SpO2 after walking 6 minutes on RA was 87%  Distance covered 320.04 meters.  Recovery phase, SpO2 after 1 minute rest on RA was 87%  Recovery phase, SpO2 after 2 minute rest on RA was 97%     Impression:   Significant desaturation with activities.   Patient does qualify for O2 supplementation with activity.    Hugh (Woodrow Payton MD  [facility] Pulmonary & Critical Care  Pager [phone]  Clinic [phone]

## 2021-06-21 NOTE — LETTER
Letter by Monica Louis RN at      Author: Monica Louis RN Service: -- Author Type: --    Filed:  Encounter Date: 3/5/2021 Status: (Other)         Buck Sinclair  [address] [place] [address] [address] [address]      March 5, 2021      Dear Mr. Sinclair,  Our records show that you have an implantable cardioverter defibrillator (ICD) for your heart that is made by [employer]. We're writing to let you know that [employer] has sent out a product warning for their ICDs.   [employer] found that some of the ICDs may need the generator replaced earlier than expected. The chance that this may happen to your ICD is very low, estimated to be about 7 in every 10,000 devices.  When the ICD reaches replacement time, an alert tone will sound from the device. It's very important for you to make sure that you can hear the alert tone.  If you have any questions about the alert tone, we would be happy to schedule an appointment in our clinic to demonstrate the tone for you. It's also essential for you to use your Carelink remote home monitor. Your Carelink remote monitor will help your clinic to watch the battery power of your ICD.   We don't recommend replacing your ICD just because of this product advisory. However, once the alert tone has sounded, we do recommend replacement as soon as possible. If you or your family hear beeping tones from your device, or if your Carelink home remote transmissions are unsuccessful, please contact your device clinic as soon as possible.   Patient safety remains our number one priority and constant focus. In addition to the above recommendations, your own electrophysiologist may have further advice that is personalized for you.  Please don't hesitate to call your device clinic with any questions or concerns.  Sincerely,  [facility] Heart Care - Electrophysiology Team    [facility] Device Clinic- [phone]  ([facility], [place], [place], [place] and [place])  [facility]  Device Clinic - [phone], Option 3  ([facility], [facility], [place] [facility] [place], [place], [place])   [facility] Device Clinic - [phone]  ([facility], [facility], [facility], [facility])

## 2021-06-22 ENCOUNTER — COMMUNICATION - HEALTHEAST (OUTPATIENT)
Dept: ANTICOAGULATION | Facility: CLINIC | Age: 76
End: 2021-06-22

## 2021-06-22 DIAGNOSIS — I48.91 ATRIAL FIBRILLATION (H): ICD-10-CM

## 2021-06-22 LAB — INR PPP: 2 (ref 0.9–1.1)

## 2021-06-22 NOTE — PROGRESS NOTES
INR 1.9 Continue current management dosing of Warfarin. Continue  diet of moderate Vitamin K intake. Discussed with pt the need to call with questions or concerns or any change in medication especially herbal medication or OTC. Call with increased bleeding or bruising or any upcoming procedures.

## 2021-06-22 NOTE — PROGRESS NOTES
INR 2.1 at home. Continue current management dosing of Warfarin. Continue  diet of moderate Vitamin K intake. Discussed with pt the need to call with questions or concerns or any change in medication especially herbal medication or OTC. Call with increased bleeding or bruising or any upcoming procedures.

## 2021-06-22 NOTE — PROGRESS NOTES
INR 2.0 After talking with pt and discussing history of greens/salads and medication change. Pt will  continue  with current diet and dosing of Warfarin.  Continue with moderation of Vit K and green leafy vegetables. Cautioned to call with increase bruising or bleeding. Reminded to call with medication change especially antibiotic. Call with any questions or concerns or any up coming procedures. Cautioned about using Herbal medication.

## 2021-06-23 NOTE — PROGRESS NOTES
Type: routine ICD remote transmission.   Presenting rhythm: atrial fibrillation with ventricular sensing 65-75 bpm.  Battery/lead status: stable.  Arrhythmias: since 10/30/18; AF burden remains 100%, ventricular rates controlled. No VT/VF detected.  Anticoagulant: warfarin.  Comments: appears to be normal ICD function. Known PAF.  Device/lead alerts: none. LOUIE      AF  Probably time to accept aF and not pursue further rhythm control;   Set up follow up visit < 1 month to discuss AF management

## 2021-06-23 NOTE — PROGRESS NOTES
Pt was called, corresponding information/recommendations reviewed, verbalized understanding, has no further questions at this time, contact information was given for further concerns/questions and scheduling notified to contact pt   1/30/2019 2:44 PM  Val Angeles RN

## 2021-06-23 NOTE — PROGRESS NOTES
INR at home 1.8 spoke with pt and will continue 5 mg 3/7 and 2.5 mg 4/7 days. Retest in 1 weeks. After talking with pt and discussing history of greens/salads and medication change. Pt will  continue  with current diet and dosing of Warfarin.  Continue with moderation of Vit K and green leafy vegetables. Cautioned to call with increase bruising or bleeding. Reminded to call with medication change especially antibiotic. Call with any questions or concerns or any up coming procedures. Cautioned about using Herbal medication.

## 2021-06-23 NOTE — PATIENT INSTRUCTIONS - HE
INR 2.1 increase dose to 5 mg sun, mon, wed and fri and 2.5 mg tue, thur and sat. After talking with pt and discussing history of greens/salads and medication change. Pt will  continue  with current diet and dosing of Warfarin.  Continue with moderation of Vit K and green leafy vegetables. Cautioned to call with increase bruising or bleeding. Reminded to call with medication change especially antibiotic. Call with any questions or concerns or any up coming procedures. Cautioned about using Herbal medication.

## 2021-06-23 NOTE — PROGRESS NOTES
INR 1.6 spoke with wife. Pt has stopped ETOH and increased greens. Will increase Warfarin to 5 mg Sun, Wed and Fri and 2.5 mg all other days. Retest in one week. After talking with pt and discussing history of greens/salads and medication change. Pt will  continue  with current diet and dosing of Warfarin.  Continue with moderation of Vit K and green leafy vegetables. Cautioned to call with increase bruising or bleeding. Reminded to call with medication change especially antibiotic. Call with any questions or concerns or any up coming procedures. Cautioned about using Herbal medication.

## 2021-06-23 NOTE — PROGRESS NOTES
INR 2.1 Increase dose of Warfarin to  5 mg Sun, Mon,  Wed and Friday and 2.5 mg all other days. Retest in 1 weeks. Pt is back in afib and will check weekly for INRs. Spoke with wife. After talking with pt and discussing history of greens/salads and medication change. Pt will  continue  with current diet and dosing of Warfarin.  Continue with moderation of Vit K and green leafy vegetables. Cautioned to call with increase bruising or bleeding. Reminded to call with medication change especially antibiotic. Call with any questions or concerns or any up coming procedures. Cautioned about using Herbal medication.

## 2021-06-23 NOTE — PATIENT INSTRUCTIONS - HE
INR 1.6 increase Warfarin to 5/ mg sun, Wed and Fri and 2.5 mg all other days. Retest in one week. After talking with pt and discussing history of greens/salads and medication change. Pt will  continue  with current diet and dosing of Warfarin.  Continue with moderation of Vit K and green leafy vegetables. Cautioned to call with increase bruising or bleeding. Reminded to call with medication change especially antibiotic. Call with any questions or concerns or any up coming procedures. Cautioned about using Herbal medication.

## 2021-06-24 NOTE — PROGRESS NOTES
INR 2.5 spoke with wife and continue 5 mg M,W,F and 2.5 mg all other days. Continue current management dosing of Warfarin. Continue  diet of moderate Vitamin K intake. Discussed with pt the need to call with questions or concerns or any change in medication especially herbal medication or OTC. Call with increased bleeding or bruising or any upcoming procedures.

## 2021-06-24 NOTE — PATIENT INSTRUCTIONS - HE
Buck Sinclair    Thank you for coming to the [facility] Clinic today for a cardiac evaluation  It was my pleasure to see you today  A good contact for any questions would be Val Angeles  RN @ [phone]    You were  in atrial fibrillation for about a month, starting January 26 through the end of February.  During that time the heart rate was controlled and there was  no evidence for fluid retention by the Optivol measurements  Unfortunately, you could not have a left atrial appendage occlusion device  because of the size of the left atrial appendage orifice was too large   Continue warfarin and goal INR 2-3  Continue current medications  Obtain echocardiogram in 6 months  Follow-up in 6 months

## 2021-06-24 NOTE — PROGRESS NOTES
INR 4.0 hold Warfarin today and then 2.5 mg Wednesday. Decrease dose to 5 mg M,W,F and 2.5 mg all other days.  retest in one week.

## 2021-06-24 NOTE — PROGRESS NOTES
[facility] Heart Care Note  Assessment:  Atrial atrial fibrillation dating back to 1997 with multiple external cardioversions        Atrial fibrillation is very symptomatic with generalized weakness fatigue but not so much palpitations  Chronic amiodarone was partially effective;Continued for many years ; stopped because of photosensitive and other adverse effects   Failed Tikosyn  Pulmonary vein isolation done on 2 occasions 2011   Post PVI Cardoversion February 12/ 2015 ; CV 10-         Cardioversion  11-        Atrial fibrillation April 25, 2017        CV 5-; relapse < 36 hour  Long-standing advanced dilated cardiomyopathy dating back to 1997       Recent stress nuclear scan; April 30 2014 showed ejection fraction 47% without ischemia        Echocardiogram 21 September 2015 showed ejection fraction equals 50%        Transesophageal echocardiogram December 4, 2017; ejection fraction 50% sinus node dysfunction   MedtronicPacemaker implanted May 1999-dual atrial leads;         generator replacement May 2005          Removal of atrial and ventricular pacing leads with placement of ICD system 6 1 2014 and Medtronic single coil   Chronic anticoagulation with warfarin ; he had excessive skin bleeding from Eliquis   Obesity-substantial weight reduction on conscientious diet   COPD felt be related to previous  exposure   Negative obstructive sleep apnea evaluation   Hypertension  Hyperkalemia  Hyperlipidemia well controlled on statin; LDL equals 81  Coronary artery disease with stenting to proximal LAD September 20, 2017  Right hand swelling may be related to the subclavian venous  obstruction    Plan:    You were  in atrial fibrillation for about a month, starting January 26 through the end of February.  During that time the heart rate was controlled and there was  no evidence for fluid retention by the Optivol measurements  Unfortunately, you could not have a left atrial appendage  occlusion device  because of the size of the left atrial appendage orifice was too large   Continue warfarin and goal INR 2-3  Continue current medications  Obtain echocardiogram in 6 months  Follow-up in 6 months          Subjective:    I had the opportunity to see.Buck Sinclair , who is a 74 y.o. male with a known history of complex heart disease and atrial fibrillation  He had an episode of atrial fibrillation that started January 26, 2019 and lasted through the end of February and then had spontaneous conversion  Is unclear if he had any symptoms or any change during atrial fibrillation.  Look at histograms, heart rate was little changed, Optivol  was not changed  I assume he would go into permanent atrial fibrillation but then had spontaneous conversion  Overall his condition seems satisfactory, he can do all his day-to-day activities without limitations although has a lot of fatigue, lassitude  No chest pain  His wife notes his right hand is a bit swollen.  He has had quite a bit of device interventions in the right subclavian region and I suspect he has some occlusion or obstruction of the right subclavicular vein.  We discussed this and I do not think any further evaluation was necessary especially since he is on anticoagulation    INRs have been a bit variable.  He was on Xarelto in the past but had skin bleeding and does not want to switch back to an alternative anticoagulant just will watch his INRs and warfarin closely  Awareness of palpitations, cannot tell if he is in atrial fibrillation  Had attempted left atrial appendage occlusion but the orifice of the left atrial appendage is too large so this procedure was aborted      Problem List:  Patient Active Problem List   Diagnosis     Dyslipidemia, goal LDL below 70     Essential hypertension     Persistent Atrial Fibrillation     ICD (implantable cardioverter-defibrillator), dual, in situ     Status post catheter ablation of atrial fibrillation      Coronary artery disease due to calcified coronary lesion     Hemoptysis     COPD, group D, by GOLD 2017 classification (H)     Medical History:  Past Medical History:   Diagnosis Date     Anemia      BPH (benign prostatic hyperplasia)      COPD (chronic obstructive pulmonary disease) (H)      Coronary artery disease due to calcified coronary lesion      Dyslipidemia, goal LDL below 70      Essential hypertension      History of cardiomyopathy      History of transfusion      Persistent atrial fibrillation (H)      Pneumonia of left lower lobe due to infectious organism (H) 10/4/2017     Skin cancer of trunk      Status post catheter ablation of atrial fibrillation 6/7/2017    PVI 4-2011 (Cryo/PVI + roof line + CTI line) Re-do PVI 7-2011 (RFA/PVI + CFE + VIDYA + confirmed CTI line)     Ventricular tachycardia (H)      Surgical History:  Past Surgical History:   Procedure Laterality Date     CARDIAC DEFIBRILLATOR PLACEMENT       CARDIOVERSION  07/11/2018    x20, last 2/12/15, 10/2015, 11/18/16, 6/16/17 by Lauren Foster CNP     CARDIOVERSION  07/11/2018     COLONOSCOPY N/A 4/28/2017    Procedure: COLONOSCOPY with 2 ascending polyps and 1 transverse polyp;  Surgeon: Jose Whittington MD;  Location: [facility] GI;  Service:      CV CORONARY ANGIOGRAM N/A 9/20/2017    Procedure: Coronary Angiogram;  Surgeon: Sergio Cervantes MD;  Location: [facility] Cath Lab;  Service:      EP ICD INSERT       FRACTURE SURGERY Left     wrist     INGUINAL HERNIA REPAIR Left 1967    while in the [employer] in [place] after 13 month in [place]     INSERT / REPLACE / REMOVE PACEMAKER       left hand surgery---tendon repair       MD ABLATE HEART DYSRHYTHM FOCUS  04/2011    Catheter Ablation Atrial Fibrillation PVI Apr 2011 (Cryo+RF-PVI + roof line + CTI line)     MD ABLATE HEART DYSRHYTHM FOCUS  07/2011    Re-do PVI Jul 2011 (RFA-PVI + CFE + VIDYA + confirmation of CTI line)     MD MYERS W/O FACETEC FORAMOT/DSKC 1/2 VRT SEG, CERVICAL      Laminectomy  Lumbar;  Recorded: 2012;     TOTAL SHOULDER REPLACEMENT Right 2016    Dr. Abernathy of [facility]     Social History:  Social History     Socioeconomic History     Marital status:      Spouse name: Neela     Number of children: Not on file     Years of education: 12     Highest education level: Not on file   Occupational History     Occupation:      Employer: RETIRED     Occupation:  police     Comment: [place]   Social Needs     Financial resource strain: Not on file     Food insecurity:     Worry: Not on file     Inability: Not on file     Transportation needs:     Medical: Not on file     Non-medical: Not on file   Tobacco Use     Smoking status: Former Smoker     Packs/day: 1.00     Years: 4.00     Pack years: 4.00     Types: Cigarettes     Last attempt to quit: 1968     Years since quittin.2     Smokeless tobacco: Never Used   Substance and Sexual Activity     Alcohol use: Yes     Alcohol/week: 1.2 oz     Types: 2 Cans of beer per week     Comment: 1 beer per week     Drug use: No     Sexual activity: Yes     Partners: Female     Birth control/protection: Post-menopausal   Lifestyle     Physical activity:     Days per week: Not on file     Minutes per session: Not on file     Stress: Not on file   Relationships     Social connections:     Talks on phone: Not on file     Gets together: Not on file     Attends Confucianist service: Not on file     Active member of club or organization: Not on file     Attends meetings of clubs or organizations: Not on file     Relationship status: Not on file     Intimate partner violence:     Fear of current or ex partner: Not on file     Emotionally abused: Not on file     Physically abused: Not on file     Forced sexual activity: Not on file   Other Topics Concern     Not on file   Social History Narrative     Not on file       Review of Systems:           General: WNL  Eyes: WNL  Ears/Nose/Throat: WNL  Lungs:  WNL  Heart: WNL  Stomach: WNL  Bladder: WNL  Muscle/Joints: WNL  Skin: WNL  Nervous System: WNL  Mental Health: WNL  Blood: WNL                                         Family History:  Family History   Problem Relation Age of Onset     Cancer Mother         leukemia     Cancer Father         bladder     Cancer Sister         breast with lung met.     Aneurysm Sister      CABG Brother      CABG Brother      Valvular heart disease Brother         valve replacement         Allergies:  Allergies   Allergen Reactions     Adhesive Other (See Comments)     ADHESIVE TAPE; SKIN IRRITATION  Foam tape:  Skin removed with tape removal     Amiodarone      ADVERSE REACTION.  Sunlight sensitivity.     Lisinopril Cough       Medications:  Current Outpatient Medications   Medication Sig Dispense Refill     ACETAMINOPHEN (TYLENOL ORAL) Take 1,000 mg by mouth 2 (two) times a day.        albuterol (PROAIR HFA;PROVENTIL HFA;VENTOLIN HFA) 90 mcg/actuation inhaler Inhale 2 puffs every 6 (six) hours as needed for wheezing. 1 Inhaler 11     amoxicillin (AMOXIL) 500 MG tablet Take prior to the Dentist       ascorbic acid (VITAMIN C) 1000 MG tablet Take 1,000 mg by mouth daily.       aspirin 81 MG EC tablet Take 1 tablet (81 mg total) by mouth daily. 30 tablet 11     atorvastatin (LIPITOR) 40 MG tablet Take 40 mg by mouth at bedtime.       budesonide-formoterol (SYMBICORT) 160-4.5 mcg/actuation inhaler Inhale 2 puffs 2 (two) times a day.       co-enzyme Q-10 30 mg capsule Take 100 mg by mouth daily.       furosemide (LASIX) 20 MG tablet TAKE 1 TABLET(20 MG) BY MOUTH DAILY 90 tablet 3     losartan (COZAAR) 50 MG tablet TAKE 1 TABLET BY MOUTH EVERY DAY 90 tablet 2     magnesium chloride (MAG 64) 64 mg TbEC delayed-release tablet Take 1 tablet (64 mg total) by mouth 2 (two) times a day. 180 tablet 5     multivitamin (MULTIVITAMIN) per tablet Take 1 tablet by mouth daily.       omega 3-dha-epa-fish oil (FISH OIL) 1,000 mg (120 mg-180 mg) cap Take  "2 tablets by mouth 2 (two) times a day.        polyethylene glycol (MIRALAX) 17 gram packet Take 17 g by mouth daily.       sotalol (BETAPACE) 80 MG tablet Take 160 mg by mouth 2 (two) times a day.  5     tiotropium (SPIRIVA) 18 mcg inhalation capsule Place 18 mcg into inhaler and inhale daily.       vardenafil (LEVITRA) 10 MG tablet Take 10 mg by mouth daily as needed for erectile dysfunction.       VITAMIN D2 50,000 unit capsule Take 50,000 Units by mouth 2 (two) times a week. SUN and WED  3     warfarin (COUMADIN/JANTOVEN) 2.5 MG tablet TAKE 5 MG TABLETS BY MOUTH M W F AND 2.5 MG TABLETS ALL OTHER DAYS. 90 tablet 0     warfarin (COUMADIN/JANTOVEN) 5 MG tablet Take as directed  0     No current facility-administered medications for this visit.          Surgical site  ICD is well-healed the right subclavicular site some increased collaterals noted    Device interrogation  Intrinsic rhythm is sinus rhythm in the 60s with intact AV conduction  16% atrial pacing  4% 8 ventricular pacing  Atrial fibrillation burden 19% with 1 month episode atrial fibrillation from January 26, 2019 through the end of February last episode atrial fibrillation was February 21 and lasted 1 hour and 20 minutes  During atrial fibrillation ventricular rate well controlled lead satisfactory  Battery voltage good for another several years    Objective:   Vital signs:    Physical Exam:  Objective:   Vital signs:  /80 (Patient Site: Left Arm, Patient Position: Sitting, Cuff Size: Adult Large)  Pulse 68  Resp 18  Ht 6' 2\" (1.88 m)  Wt (!) 282 lb (127.9 kg)  BMI 36.21 kg/m2      GENERAL APPEARANCE: Alert, cooperative and in no acute distress.  HEENT: No scleral icterus. No Xanthelasma. Oral mucous membranes pink and moist.  NECK: JVP normal  cm. No Hepatojugular reflux. Thyroid not  Palpable  CHEST: clear to auscultation and percussion  CARDIOVASCULAR: S1, S2  2-3/6 apical systollic murmur    Brachial, radial  pulses are intact and " symmetric.   No carotid bruits noted.  ABDOMEN: Non tender. BS+. No bruits.  EXTREMITIES: No cyanosis, clubbing or edema.    Lab Results:  LIPIDS:  Lab Results   Component Value Date    CHOL 133 12/04/2018    CHOL 142 05/02/2018    CHOL 143 09/14/2017     Lab Results   Component Value Date    HDL 52 12/04/2018    HDL 53 05/02/2018    HDL 51 09/14/2017     Lab Results   Component Value Date    LDLCALC 69 12/04/2018    LDLCALC 80 05/02/2018    LDLCALC 78 09/14/2017     Lab Results   Component Value Date    TRIG 59 12/04/2018    TRIG 46 05/02/2018    TRIG 69 09/14/2017     No components found for: CHOLHDL    BMP:  Lab Results   Component Value Date    CREATININE 0.93 12/04/2018    BUN 20 12/04/2018     12/04/2018    K 4.5 12/04/2018     (H) 12/04/2018    CO2 24 12/04/2018         This note has been dictated using voice recognition software. Any grammatical or context distortions are unintentional and inherent to the software.  Everett Renee MD  [facility] HEART [facility]  [phone]

## 2021-06-25 NOTE — PROGRESS NOTES
ANTICOAGULATION  MANAGEMENT-Patient Home Monitoring Result    Assessment     Therapeutic INR result of 2.2 (goal INR of 2.0-3.0) received via fax from [employer] home INR monitoring company.        Previous INR was Therapeutic    Buck Sinclair last contacted by phone: 3/16/21    Plan     Per home monitoring agreement with patient, patient was NOT contacted regarding therapeutic result today.  Patient is to continue current dose and continue to check INR with home monitor per protocol.  ?   Fatoumata Ochoa RN    Subjective/Objective:      Buck Sinclair, a 76 y.o. male  is established on warfarin using a home INR monitor.    Anticoagulation Episode Summary     Current INR goal:  2.0-3.0   TTR:  93.5 % (1 y)   Next INR check:  6/8/2021   INR from last check:  2.20 (6/1/2021)   Weekly max warfarin dose:     Target end date:     INR check location:     Preferred lab:     Send INR reminders to:  [facility] HEART [facility]    Indications    Persistent Atrial Fibrillation [I48.91]           Comments:  home monitor talk to wife about dose [facility]   [phone]            Anticoagulation Care Providers     Provider Role Specialty Phone number    Erica Hansen CNP  Cardiology [phone]    Everett Renee MD  Cardiology [phone]

## 2021-06-25 NOTE — PROGRESS NOTES
Progress Notes by Jenny Peter PA-C at 11/30/2017  9:50 AM     Author: Jenny Peter PA-C Service: -- Author Type: Physician Assistant    Filed: 11/30/2017 11:40 AM Encounter Date: 11/30/2017 Status: Signed    : Jenny Peter PA-C (Physician Assistant)           Click to link to [facility] Heart [facility] HEART [facility] NOTE      Assessment/Recommendations   Problem List Items Addressed This Visit     Essential hypertension (Chronic)    Persistent Atrial Fibrillation - Primary (Chronic)    Status post catheter ablation of atrial fibrillation (Chronic)    Coronary artery disease due to calcified coronary lesion (Chronic)          1.  Atrial fibrillation: Status post 2 ablations in the past.  He has low burden of arrhythmia now and is on sotalol 120 mg twice daily and Cartia  mg daily.  He has a [id number] score of 3 for age 65-74, CAD and hypertension.  He has a HAS-BLED score of 3 for bleeding history with hemoglobin drop, age greater than 65 and need for lifelong aspirin.   He is not a candidate for long-term anticoagulation due to predisposition and tendency to bleed while on warfarin and history of pneumonia with cavitary lesion and large clot in left mainstem bronchus causing hemoptysis.  He is scheduled for transesophageal echo on December 4 and if his anatomy is appropriate will be scheduled subsequently for the Watchman left atrial appendage occlusion device.     We discussed the need for pre-and post procedure KANDICE and the need to stay on his warfarin prior to and 6 weeks post-implant.  We discussed that if the post KANDICE is negative for leaks, he would then stop his warfarin and go to aspirin 81 mg plus Plavix 75 mg by mouth daily for a year from his stent placement, after which he would switch to just an aspirin daily.  He understands that the risks of the procedure are <2% and include, but are not limited to device embolization, air embolism, myocardial  perforation, device thrombosis, ASD, stroke, or death.  We discussed expected recovery and follow-up.       His questions were answered to his satisfaction and he is ready to proceed.  Amulet trial was discussed and he is somewhat interested.  Monique from research will talk with him today to get final decision.  He understands that he would be randomized 1: 1 and if he gets Amulet would come off Coumadin the day of procedure.  If he gets the watchman arm then he would follow the protocol listed above.    2.  Coronary artery disease: with stent placement on September 20, 2017.  He is now off aspirin and is currently on Plavix 75 mg daily.  He will need to be on this for 1 year.  Currently on high intensity statin therapy with atorvastatin 40 mg daily.  Last lipid profile was September 2017 with LDL 78.  No current anginal symptoms.    3.  Hypertension: Blood pressure is at goal today on current dose of losartan and Cartia XT.    4. History of idiopathic cardiomyopathy: Status post ICD placement with normalization of EF and last EF on echo 49%.     History of Present Illness    Buck Sinclair is a 72 y.o. male who comes in today for history and physical prior to screening KANDICE and insertion of left atrial appendage occulsion device.    Buck Sinclair has a past history of paroxysmal atrial fibrillation, hypertension, coronary artery disease, prior catheter ablation, subsequent SSS and ICD/pacemaker in place.  He has had over 20 cardioversions as well.  He was originally on Xarelto but was taken off because of possible interaction with his meloxicam.  He has been on Coumadin since September 2015 and does home monitoring.    In September 2017 he was having ongoing dyspnea with exertion and had a stress test which was abnormal.  This led to cardiac catheterization and on cath was found to have lesions in the LAD and first diagonal and received drug-eluting stents.  He was subsequently started on aspirin and Plavix  in addition to his warfarin.  He was supposed to be on the aspirin for 1 month prior to discontinuing it and continuing the Coumadin with Plavix.  Unfortunately he was admitted in early October with hemoptysis and was found to have pneumonia with a cavitary lesion and a large clot in his left mainstem bronchus.  He his warfarin was discontinued at that time and he was treated with Augmentin for the pneumonia.    After the pneumonia resolved and he came off of his aspirin, he was restarted on his warfarin.  Since restarting his warfarin, he has remained on his Plavix and has had no further issues with bleeding.    In terms of symptoms the patient states that his shortness of breath has gotten better since the stents were placed.  He does occasionally have gait imbalance but otherwise denies chest discomfort, palpitations, paroxysmal nocturnal dyspnea, orthopnea, lightheadedness, dizziness, pre-syncope, or syncope.  Buck Sinclair also denies any weight loss, changes in appetite, nausea or vomiting.     Medical, surgical, family, social history, and medications were reviewed and updated as necessary.       Physical Examination Review of Systems   Vitals:    11/30/17 0947   BP: 128/80   Pulse: 76   Resp: 24     Body mass index is 34.37 kg/(m^2).  Wt Readings from Last 3 Encounters:   11/30/17 (!) 275 lb (124.7 kg)   11/28/17 (!) 275 lb (124.7 kg)   11/21/17 (!) 265 lb (120.2 kg)       General Appearance:   Alert, cooperative and in no acute distress   ENT/Mouth: membranes moist, no oral lesions or bleeding gums.      EYES:  no scleral icterus, normal conjunctivae   Neck: Thyroid not visualized   Chest/Lungs:   lungs are clear to auscultation, no rales or wheezing   Cardiovascular:   Regular . Normal first and second heart sounds with no murmurs, rubs or gallops; the carotid, radial and posterior tibial pulses are intact, 1+ edema bilaterally    Abdomen:  Soft and nontender. Bowel sounds are present in all quadrants    Extremities: no cyanosis or clubbing   Skin: no xanthelasma, warm.    Neurologic: normal gait, normal  bilateral, no tremors   Psychiatric: Normal mood and affect    General: WNL  Eyes: WNL  Ears/Nose/Throat: WNL  Lungs: WNL  Heart: WNL  Stomach: WNL  Bladder: WNL  Muscle/Joints: WNL  Skin: WNL  Nervous System: WNL  Mental Health: WNL     Blood: WNL     Medical History  Surgical History Family History Social History   Past Medical History:   Diagnosis Date   ? Anemia    ? BPH (benign prostatic hyperplasia)    ? COPD (chronic obstructive pulmonary disease)    ? Coronary artery disease due to calcified coronary lesion    ? Dyslipidemia, goal LDL below 70    ? Essential hypertension    ? History of cardiomyopathy    ? History of transfusion    ? Persistent atrial fibrillation    ? Pneumonia of left lower lobe due to infectious organism 10/4/2017   ? Skin cancer of trunk    ? Status post catheter ablation of atrial fibrillation 6/7/2017    PVI 4-2011 (Cryo/PVI + roof line + CTI line) Re-do PVI 7-2011 (RFA/PVI + CFE + VIDYA + confirmed CTI line)   ? Ventricular tachycardia     Past Surgical History:   Procedure Laterality Date   ? CARDIAC DEFIBRILLATOR PLACEMENT     ? CARDIOVERSION      x20, last 2/12/15, 10/2015, 11/18/16, 6/16/17 by Lauren Foster CNP   ? COLONOSCOPY N/A 4/28/2017    Procedure: COLONOSCOPY with 2 ascending polyps and 1 transverse polyp;  Surgeon: Jose Whittington MD;  Location: [facility] GI;  Service:    ? CV CORONARY ANGIOGRAM N/A 9/20/2017    Procedure: Coronary Angiogram;  Surgeon: Sergio Cervantes MD;  Location: [facility] Cath Lab;  Service:    ? EP ICD INSERT     ? FRACTURE SURGERY Left     wrist   ? INGUINAL HERNIA REPAIR Left 1967    while in the [employer] in [place] after 13 month in [place]   ? INSERT / REPLACE / REMOVE PACEMAKER     ? [place] ABLATE HEART DYSRHYTHM FOCUS  04/2011    Catheter Ablation Atrial Fibrillation PVI Apr 2011 (Cryo+RF-PVI + roof line + CTI line)   ? FL ABLATE HEART  DYSRHYTHM FOCUS  07/2011    Re-do PVI Jul 2011 (RFA-PVI + CFE + VIDYA + confirmation of CTI line)   ? MS MYERS W/O FACETEC FORAMOT/DSKC 1/2 VRT SEG, CERVICAL      Laminectomy Lumbar;  Recorded: 03/09/2012;   ? TOTAL SHOULDER REPLACEMENT Right 03/03/2016    Dr. Abernathy of [facility]    Family History   Problem Relation Age of Onset   ? Cancer Mother      leukemia   ? Cancer Father      bladder   ? Cancer Sister     Social History     Social History   ? Marital status:      Spouse name: Neela   ? Number of children: N/A   ? Years of education: 12     Occupational History   ?  Retired   ? [employer]     Social History Main Topics   ? Smoking status: Former Smoker     Types: Cigarettes     Quit date: 1/1/1968   ? Smokeless tobacco: Never Used   ? Alcohol use 1.2 oz/week     2 Cans of beer per week      Comment: 1 beer per week   ? Drug use: No   ? Sexual activity: Yes     Partners: Female     Birth control/ protection: Post-menopausal     Other Topics Concern   ? Not on file     Social History Narrative          Medications  Allergies   Current Outpatient Prescriptions   Medication Sig Dispense Refill   ? ACETAMINOPHEN (TYLENOL ORAL) Take 1,000 mg by mouth 2 (two) times a day.      ? ascorbic acid (VITAMIN C) 1000 MG tablet Take 1,000 mg by mouth daily.     ? atorvastatin (LIPITOR) 40 MG tablet Take 40 mg by mouth daily.     ? budesonide-formoterol (SYMBICORT) 160-4.5 mcg/actuation inhaler Inhale 2 puffs 2 (two) times a day.     ? CARTIA  mg 24 hr capsule TAKE 1 CAPSULE BY MOUTH EVERY EVENING 90 capsule 2   ? clopidogrel (PLAVIX) 75 mg tablet Take 1 tablet (75 mg total) by mouth daily. 30 tablet 11   ? co-enzyme Q-10 30 mg capsule Take 100 mg by mouth daily.     ? diltiazem (CARTIA XT) 240 MG 24 hr capsule Take 1 capsule (240 mg total) by mouth daily. 90 capsule 1   ? furosemide (LASIX) 20 MG tablet Take 20 mg by mouth daily.      ? losartan (COZAAR) 50 MG tablet Take  1 tablet (50 mg total) by mouth daily. 90 tablet 5   ? MAG 64 64 mg TbEC delayed-release tablet TAKE 1 TABLET BY MOUTH TWICE DAILY 180 tablet 1   ? multivitamin (MULTIVITAMIN) per tablet Take 1 tablet by mouth daily.     ? omega 3-dha-epa-fish oil (FISH OIL) 1,000 mg (120 mg-180 mg) cap Take 2 tablets by mouth 2 (two) times a day.      ? polyethylene glycol (MIRALAX) 17 gram packet Take 17 g by mouth daily.     ? sotalol (BETAPACE) 120 MG tablet TAKE 1 TABLET(120 MG) BY MOUTH TWICE DAILY 180 tablet 1   ? tiotropium (SPIRIVA) 18 mcg inhalation capsule Place 18 mcg into inhaler and inhale daily.     ? vardenafil (LEVITRA) 10 MG tablet Take 10 mg by mouth daily as needed for erectile dysfunction.     ? VITAMIN D2 50,000 unit capsule Take 50,000 Units by mouth 2 (two) times a week. SUN and WED  3   ? warfarin (COUMADIN) 2.5 MG tablet Take 2.5 mg by mouth See Admin Instructions.       No current facility-administered medications for this visit.       Allergies   Allergen Reactions   ? Adhesive Other (See Comments)     ADHESIVE TAPE; SKIN IRRITATION   ? Amiodarone      ADVERSE REACTION.  Sunlight sensitivity.   ? Lisinopril Cough         Lab Results    Chemistry/lipid CBC Cardiac Enzymes/BNP/TSH/INR   Lab Results   Component Value Date    CHOL 143 09/14/2017    HDL 51 09/14/2017    LDLCALC 78 09/14/2017    TRIG 69 09/14/2017    CREATININE 1.02 10/06/2017    BUN 18 10/06/2017    K 4.2 10/06/2017     10/06/2017     10/06/2017    CO2 26 10/06/2017    Lab Results   Component Value Date    WBC 6.1 10/06/2017    HGB 11.9 (L) 10/06/2017    HCT 36.0 (L) 10/06/2017    MCV 96 10/06/2017     10/06/2017    Lab Results   Component Value Date    TROPONINI 0.01 10/04/2017     (H) 10/04/2017    INR 2.4 11/28/2017          40 minutes were spent with the patient with greater than 50% spent on education and counseling.    This note has been dictated using voice recognition software. Any grammatical or context  distortions are unintentional and inherent to the software.    Jenny Strauss PA-C, MPAS  Structural Heart Program  [facility]

## 2021-06-25 NOTE — TELEPHONE ENCOUNTER
Received a faxed INR result for Buck Sinclair  From [facility]  INR result dated 6/8/2021 is 1.8

## 2021-06-25 NOTE — PROGRESS NOTES
Progress Notes by Prosper Barrow MD at 11/8/2017 12:50 PM     Author: Prosper Barrow MD Service: -- Author Type: Physician    Filed: 11/8/2017  1:26 PM Encounter Date: 11/8/2017 Status: Signed    : Prosper Barrow MD (Physician)           Click to link to [facility] Heart Care     [facility] Care Clinic Note      Assessment:    1. Coronary artery disease due to calcified coronary lesion     2. Dyslipidemia, goal LDL below 70         Plan:    1.  Return to see Dr. Barrow in 1 year.    An After Visit Summary was printed and given to the patient.    Subjective:    Buck Sinclair returned for a planned annual follow up visit.  His wife accompanied him to the visit.    We reviewed his medical issues since his last visit with me in 2016.  He has had pulmonic vein isolation for atrial fibrillation and is planning to have left atrial appendage occlusion device implantation with Dr. Deejay Lizama.  He developed worsening symptoms of shortness of breath and was found to have new high risk findings on nuclear stress testing.  He subsequently underwent percutaneous coronary intervention by Dr. Sergio Cervantes.  He then developed pneumonia with a lung abscess.  To top it off, he cut his foot with a chainsaw.  Thus, he will only be starting cardiac rehab next week.    He does not have any cardiac symptoms at this time.  Specifically, he denies angina pectoris, unusual shortness of breath, lightheadedness, orthopnea, or palpitations.    He is currently anticoagulated with warfarin and takes Plavix.    Past Medical History:    Patient Active Problem List   Diagnosis   ? Dyslipidemia, goal LDL below 70   ? Essential hypertension   ? Persistent Atrial Fibrillation   ? History of sick sinus syndrome   ? ICD (implantable cardioverter-defibrillator), dual, in situ   ? Status post catheter ablation of atrial fibrillation   ? Coronary artery disease due to calcified coronary lesion   ? COPD without exacerbation        Past Medical History:   Diagnosis Date   ? Anemia    ? BPH (benign prostatic hyperplasia)    ? Cardiomyopathy    ? COPD (chronic obstructive pulmonary disease)    ? Coronary artery disease    ? Dyslipidemia, goal LDL below 100    ? Essential hypertension    ? High cholesterol    ? History of transfusion    ? Persistent atrial fibrillation    ? Pneumonia of left lower lobe due to infectious organism 10/4/2017   ? Skin cancer of trunk    ? Status post catheter ablation of atrial fibrillation 6/7/2017    PVI 4-2011 (Cryo/PVI + roof line + CTI line) Re-do PVI 7-2011 (RFA/PVI + CFE + VIDYA + confirmed CTI line)   ? Ventricular tachycardia        Past Surgical History:   Procedure Laterality Date   ? CARDIAC DEFIBRILLATOR PLACEMENT     ? CARDIOVERSION      x20, last 2/12/15, 10/2015, 11/18/16, 6/16/17 by Lauren Foster CNP   ? COLONOSCOPY N/A 4/28/2017    Procedure: COLONOSCOPY with 2 ascending polyps and 1 transverse polyp;  Surgeon: Jose Whittington MD;  Location: [facility] GI;  Service:    ? CV CORONARY ANGIOGRAM N/A 9/20/2017    Procedure: Coronary Angiogram;  Surgeon: Sergio Cervantes MD;  Location: [facility] Cath Lab;  Service:    ? EP ICD INSERT     ? FRACTURE SURGERY Left     wrist   ? INGUINAL HERNIA REPAIR Left 1967    while in the [employer] in [place] after 13 month in [place]   ? INSERT / REPLACE / REMOVE PACEMAKER     ? ME ABLATE HEART DYSRHYTHM FOCUS  04/2011    Catheter Ablation Atrial Fibrillation PVI Apr 2011 (Cryo+RF-PVI + roof line + CTI line)   ? ME ABLATE HEART DYSRHYTHM FOCUS  07/2011    Re-do PVI Jul 2011 (RFA-PVI + CFE + VIDYA + confirmation of CTI line)   ? ME MYERS W/O FACETEC FORAMOT/DSKC 1/2 VRT SEG, CERVICAL      Laminectomy Lumbar;  Recorded: 03/09/2012;   ? TOTAL SHOULDER REPLACEMENT Right 03/03/2016    Dr. Abernathy of [facility]        reports that he quit smoking about 49 years ago. His smoking use included Cigarettes. He has never used smokeless tobacco. He reports that he  drinks about 1.2 oz of alcohol per week  He reports that he does not use illicit drugs.    Family History   Problem Relation Age of Onset   ? Cancer Mother      leukemia   ? Cancer Father      bladder   ? Cancer Sister        Outpatient Encounter Prescriptions as of 11/8/2017   Medication Sig Dispense Refill   ? ACETAMINOPHEN (TYLENOL ORAL) Take 1,000 mg by mouth 2 (two) times a day.      ? amoxicillin-clavulanate (AUGMENTIN) 875-125 mg per tablet Take 1 tablet by mouth 2 (two) times a day. 28 tablet 0   ? ascorbic acid (VITAMIN C) 1000 MG tablet Take 1,000 mg by mouth daily.     ? atorvastatin (LIPITOR) 40 MG tablet Take 40 mg by mouth daily.     ? budesonide-formoterol (SYMBICORT) 160-4.5 mcg/actuation inhaler Inhale 2 puffs 2 (two) times a day.     ? clopidogrel (PLAVIX) 75 mg tablet Take 1 tablet (75 mg total) by mouth daily. 30 tablet 11   ? co-enzyme Q-10 30 mg capsule Take 100 mg by mouth daily.     ? diltiazem (CARTIA XT) 240 MG 24 hr capsule Take 1 capsule (240 mg total) by mouth daily. 90 capsule 1   ? furosemide (LASIX) 20 MG tablet Take 20 mg by mouth 2 (two) times a day.     ? losartan (COZAAR) 50 MG tablet Take 1 tablet (50 mg total) by mouth daily. 90 tablet 5   ? MAG 64 64 mg TbEC delayed-release tablet TAKE 1 TABLET BY MOUTH TWICE DAILY 180 tablet 1   ? multivitamin (MULTIVITAMIN) per tablet Take 1 tablet by mouth daily.     ? omega 3-dha-epa-fish oil (FISH OIL) 1,000 mg (120 mg-180 mg) cap Take by mouth.     ? polyethylene glycol (MIRALAX) 17 gram packet Take 17 g by mouth daily.     ? sotalol (BETAPACE) 120 MG tablet TAKE 1 TABLET(120 MG) BY MOUTH TWICE DAILY 180 tablet 1   ? vardenafil (LEVITRA) 10 MG tablet Take 10 mg by mouth daily as needed for erectile dysfunction.     ? VITAMIN D2 50,000 unit capsule Take 50,000 Units by mouth 2 (two) times a week. SUN and WED  3   ? warfarin (COUMADIN) 2.5 MG tablet Take 2.5 mg by mouth See Admin Instructions.     ? tiotropium (SPIRIVA) 18 mcg inhalation  "capsule Place 18 mcg into inhaler and inhale daily.       No facility-administered encounter medications on file as of 11/8/2017.        Review of Systems:     General: WNL  Eyes: WNL  Ears/Nose/Throat: WNL  Lungs: WNL  Heart: WNL  Stomach: WNL  Bladder: WNL  Muscle/Joints: WNL  Skin: WNL  Nervous System: WNL  Mental Health: WNL     Blood: WNL    Objective:     /70 (Patient Site: Left Arm, Patient Position: Sitting, Cuff Size: Adult Large)  Pulse 80  Resp 16  Ht 6' 3\" (1.905 m)  Wt (!) 275 lb (124.7 kg)  BMI 34.37 kg/m2  Wt Readings from Last 5 Encounters:   11/08/17 (!) 275 lb (124.7 kg)   10/25/17 (!) 274 lb (124.3 kg)   10/18/17 (!) 262 lb (118.8 kg)   10/11/17 (!) 275 lb (124.7 kg)   10/04/17 (!) 269 lb 12.8 oz (122.4 kg)        Physical Exam:    GENERAL APPEARANCE: alert, no apparent distress  EYES: no scleral icterus  NECK: no carotid bruits or adenopathy, jugular venous pressure is difficult to evaluate due to obesity  CHEST: symmetric, the lungs are clear to auscultation  CARDIOVASCULAR: regular rhythm without murmur or gallop  EXTREMITIES: no cyanosis, clubbing; there is 1+ nonpitting lower leg edema  SKIN: no xanthelasma  NEUROLOGIC: normal gait and coordination  PSYCHIATRIC: mood and affect are normal    Cardiac Testing:       Results for orders placed during the hospital encounter of 09/13/17   NM Pharmacologic Stress Test [[id number]] 09/13/2017    Addendum   The pharmacologic nuclear stress test is abnormal.   There is a small area of ischemia in the distal anterior and  anterior-apical segment(s) of the left ventricle.   Increased stress to rest cavity ratio of 1.53 is consistent with diffuse  subendocardial ischemia.   The left ventricular ejection fraction is 53%.   When compared to the images of 4/30/2014, there have been changes noted;  the distal anterior ischemia and TID are new   The patient is at a high risk of future cardiac ischemic events given  the perfusion defect and the TID.      "   Christi Saunders MD 9/13/2017  3:27 PM          Narrative   The pharmacologic nuclear stress test is abnormal.    There is a small area of ischemia in the distal anterior and   anterior-apical segment(s) of the left ventricle.    The left ventricular ejection fraction is 53%.    When compared to the images of 4/30/2014, there have been changes noted;   the distal anterior ischemia is new    The patient is at a low risk of future cardiac ischemic events.          Imaging:    Ct Chest Without Contrast    Result Date: 10/25/2017  CT CHEST WO CONTRAST 10/25/2017 8:17 AM INDICATION: Pneumonia complicated / unresolved. Hemoptysis. Follow-up cavitary lesion. TECHNIQUE: Routine chest. Dose reduction techniques were used. IV CONTRAST: None COMPARISON: 10/4/2017 FINDINGS: LUNGS AND PLEURA: Improved lung aeration with resolution of groundglass opacities previously present within left lower lobe there is also complete resolution of an inflammatory nodule previously identified within posterior aspect right upper lobe. Linear  scarring persists bilaterally, most pronounced medial right base. 10 mm cavitary lesion previously seen medial left lung base is similar in size now appears to be solid, likely likely fluid-filled. Resolution of surrounding airspace disease. MEDIASTINUM: No significant lymphadenopathy. Very dense coronary artery calcification noted. Pacemaker present. LIMITED UPPER ABDOMEN: Negative. MUSCULOSKELETAL: Degenerative changes of thoracic spine, no suspicious lesions.     CONCLUSION: 1.  Significant overall improvement with resolution of inflammatory infiltrates and groundglass opacity. No new abnormality. 2.  Previously identified 10 mm left lower lobe cavitary lesion stable in size and now appears fluid-filled, resolution of the surrounding pneumonia. Likely this is inflammatory but should be followed. Recommend a repeat CT in 6 months.      Lab Results:    Lab Results   Component Value Date    CREATININE 1.02  10/06/2017    BUN 18 10/06/2017     10/06/2017    K 4.2 10/06/2017     10/06/2017    CO2 26 10/06/2017     Lab Results   Component Value Date    CHOL 143 09/14/2017    TRIG 69 09/14/2017    HDL 51 09/14/2017     BNP (pg/mL)   Date Value   10/04/2017 214 (H)   09/07/2017 270 (H)   02/09/2015 131 (H)     Creatinine (mg/dL)   Date Value   10/06/2017 1.02   10/05/2017 1.07   10/04/2017 1.08   09/21/2017 0.92     LDL Calculated (mg/dL)   Date Value   09/14/2017 78   12/13/2016 81   05/06/2016 62     Lab Results   Component Value Date    WBC 6.1 10/06/2017    HGB 11.9 (L) 10/06/2017    HCT 36.0 (L) 10/06/2017    MCV 96 10/06/2017     10/06/2017           Much or all of the text in this note was generated through the use of the Dragon Dictate voice-to-text software. Errors in spelling or words which seem out of context are unintentional. Sound alike errors, in particular, may have escaped editing.

## 2021-06-25 NOTE — TELEPHONE ENCOUNTER
ANTICOAGULATION  MANAGEMENT    Assessment     Today's INR result of 2.2 is Therapeutic (goal INR of 2.0-3.0)        Warfarin taken as previously instructed    No new diet changes affecting INR    No new medication/supplements affecting INR    Continues to tolerate warfarin with no reported s/s of bleeding or thromboembolism     Previous INR was Subtherapeutic    Plan:     Spoke on phone with patient's spouse Neela regarding INR result and instructed:      Warfarin Dosing Instructions:  Continue current warfarin dose    5 mg every Mon; 2.5 mg all other days      (0 % change)    Instructed patient to follow up no later than: one week with home monitor  Neela is aware that [facility] will call only for out of range INR's     Education provided: importance of therapeutic range, target INR goal and significance of current INR result and importance of following up for INR monitoring at instructed interval    Neela verbalizes understanding and agrees to warfarin dosing plan.    Instructed to call the [facility] for any changes, questions or concerns. (#[phone])   ?   Gisele Prince RN    Subjective/Objective:      Buck Sinclair, a 76 y.o. male is on warfarin. Buck      Buck reports:     Home warfarin dose: verbally confirmed home dose with Neela and updated on anticoagulation calendar     Missed doses: No     Medication changes:  No     S/S of bleeding or thromboembolism:  No     New Injury or illness:  No     Changes in diet or alcohol consumption:  No     Upcoming surgery, procedure or cardioversion:  No    Anticoagulation Episode Summary     Current INR goal:  2.0-3.0   TTR:  91.5 % (1 y)   Next INR check:  6/22/2021   INR from last check:  2.20 (6/15/2021)   Weekly max warfarin dose:     Target end date:     INR check location:     Preferred lab:     Send INR reminders to:  [facility]    Indications    Persistent Atrial Fibrillation [I48.91]           Comments:  home monitor talk to wife  about dose Neela   [phone]            Anticoagulation Care Providers     Provider Role Specialty Phone number    Erica Hansen, CNP  Cardiology [phone]    Everett Renee MD  Cardiology [phone]

## 2021-06-25 NOTE — TELEPHONE ENCOUNTER
Received a faxed INR result for Buck Sinclair  From [facility]  INR result dated 6/15/2021 is 2.2

## 2021-06-26 NOTE — PROGRESS NOTES
Progress Notes by Erica Hansen CNP at 10/30/2018  8:30 AM     Author: Erica Hansen CNP Service: -- Author Type: Nurse Practitioner    Filed: 10/30/2018  9:04 AM Encounter Date: 10/30/2018 Status: Signed    : Erica Hansen CNP (Nurse Practitioner)          Click to link to [facility] HEART [facility] ELECTROPHYSIOLOGY NOTE      Assessment/Recommendations   Assessment/Plan:    Diagnoses and all orders for this visit:    Persistent atrial fibrillation (H) and currently maintaining sinus/paced rhythm on high dose sotalol.  Need to avoid any further QTC lengthening medications.  -     ECG 12 lead with MUSE    Essential hypertension and well-controlled.    ICD (implantable cardioverter-defibrillator), dual, in situ and device functioning appropriately.    Other orders  -     sotalol (BETAPACE) 80 MG tablet; Take 160 mg by mouth 2 (two) times a day.; Refill: 5  -     warfarin (COUMADIN/JANTOVEN) 5 MG tablet; Take as directed; Refill: 0    [id number] score of 3 and on chronic warfarin with no alternative to anticoagulation at this point.  Follow up in clinic with device check and see Dr. Renee in 4 months.     History of Present Illness    Mr. Buck Sinclair is a very pleasant 73 y.o. male who comes in today for EP follow-up for persistent atrial fibrillation.  Buck Sinclair has a known history of persistent atrial fibrillation and cardiomyopathy, which has now resolved.  He has had frequent cardioversions for persistent atrial fibrillation and on increasing doses of sotalol.  He has failed amiodarone and recently increase sotalol this summer to 160 mg orally every 12 hours.  With increase in drug, he reverted back to sinus/paced rhythm and then he was taken off of sotalol and switch to rate control.  Buck thinks he is indirectly symptomatic with A. fib and wanted a trial of sinus again.  Therefore, I restarted sotalol and he had repeat cardioversion in July.   He has low A. fib burden on high dose sotalol.  He denies any side effects.  QTC at limit again noted today on twelve-lead EKG and stressed to check with us or with pharmacist with any new medications added.  Buck was supposed to get the watchman device in September but unfortunately his left atrial appendage was not amenable to device.  He remains on warfarin and aspirin 81 mg daily.  He had hemoptysis recently and was seen by pulmonogist .  He is on antibiotic for about 5 days now.  He has URI symptoms and gradually improving.  He denies any cardiac symptoms.  He is accompanied by his wife who does most of the talking.    Cardiographics (personally reviewed):  Results for orders placed during the hospital encounter of 08/30/18   Echo KANDICE Structural Guidance [ECH27] 08/30/2018    Narrative Structural KANDICE for Left Atrial Occlusion Device    Pre-Device:   1. Normal left ventricular size and systolic function.  LVEF: 55%  2. No left atrial thrombus or spontaneous contrast.  Severe left atrial   enlargement.    3. No ASD or PFO by color flow.   4. No pericardial effusion.     CHARLY device measurements:   Device compression diameter:   Pre deployment.   0 : 32.5 mm  45 : 35.7 mm  90 :  28.5 mm  135  : 33.7 mm    The left atrial appendage measurements are greater than the maximum   tolerated measurements to proceed with device implantation. The procedure   was canceled.    Results communicated directly to Dr. Gong       Results for orders placed in visit on 07/06/18   NM Pharmacologic Stress Test    Narrative   The pharmacologic nuclear stress test is abnormal.    There is a small area of mixed ischemia and nontransmural infarction in   the apical segment(s) of the left ventricle.    The left ventricular ejection fraction is 61%.    The patient is at a low risk of future cardiac ischemic events.    When compared to the images of 9/13/2017, the ejection fraction has   increased from 53% to 61%, otherwise, the findings  are similar.        Device check shows leads stable and battery ok.  Device functioning appropriately.   Atrial pacing 23 % and Ventricular pacing 6 %.  AT/AFib burden 0.4% and longest episode was 3-1/2 hours.  Ventricular response greater than or equal to 120 15% of the time. No ventr arrhythmias.      Results for orders placed or performed in visit on 10/30/18   ECG 12 lead with MUSE   Result Value Ref Range    SYSTOLIC BLOOD PRESSURE  mmHg    DIASTOLIC BLOOD PRESSURE  mmHg    VENTRICULAR RATE 60 BPM    ATRIAL RATE 66 BPM    P-R INTERVAL  ms    QRS DURATION 106 ms    Q-T INTERVAL 494 ms    QTC CALCULATION (BEZET) 494 ms    P Axis  degrees    R AXIS -10 degrees    T AXIS 20 degrees    MUSE DIAGNOSIS       Undetermined rhythm  Cannot rule out Inferior infarct , age undetermined  Abnormal ECG  When compared with ECG of 30-AUG-2018 08:59,  Current undetermined rhythm precludes rhythm comparison, needs review            Problem List:  Patient Active Problem List   Diagnosis   ? Dyslipidemia, goal LDL below 70   ? Essential hypertension   ? Persistent Atrial Fibrillation   ? ICD (implantable cardioverter-defibrillator), dual, in situ   ? Status post catheter ablation of atrial fibrillation   ? Coronary artery disease due to calcified coronary lesion   ? Hemoptysis   ? COPD, group D, by GOLD 2017 classification (H)       Physical Examination Review of Systems   Vitals:    10/30/18 0808   BP: 100/72   Pulse: 60   Resp: 16     Body mass index is 34.12 kg/(m^2).  Wt Readings from Last 3 Encounters:   10/30/18 (!) 273 lb (123.8 kg)   10/25/18 (!) 282 lb 3.2 oz (128 kg)   10/25/18 (!) 265 lb (120.2 kg)     General Appearance:   Alert, well-appearing and in no acute distress.   HEENT: Atraumatic, normocephalic.  No scleral icterus, normal conjunctivae; mucous membranes pink and moist.     Chest: Chest symmetric, spine straight.   Lungs:   Respirations unlabored: Lungs are clear to auscultation.   Cardiovascular:   Normal first  and second heart sounds with no murmurs, rubs, or gallops.  Regular, regular.  Radial and posterior tibial pulses are intact.  Normal JVD, no edema.       Extremities: No cyanosis or clubbing   Musculoskeletal: Moves all extremities   Skin: Warm, dry, intact.    Neurologic: Mood and affect are appropriate, alert and oriented to person, place, time, and situation    General: WNL  Eyes: WNL  Ears/Nose/Throat: WNL  Lungs: Cough, Shortness of Breath  Heart: WNL  Stomach: WNL  Bladder: WNL  Muscle/Joints: WNL  Skin: WNL  Nervous System: WNL  Mental Health: WNL     Blood: WNL       Medical History  Surgical History Family History Social History   Past Medical History:   Diagnosis Date   ? Anemia    ? BPH (benign prostatic hyperplasia)    ? COPD (chronic obstructive pulmonary disease) (H)    ? Coronary artery disease due to calcified coronary lesion    ? Dyslipidemia, goal LDL below 70    ? Essential hypertension    ? History of cardiomyopathy    ? History of transfusion    ? Persistent atrial fibrillation (H)    ? Pneumonia of left lower lobe due to infectious organism (H) 10/4/2017   ? Skin cancer of trunk    ? Status post catheter ablation of atrial fibrillation 6/7/2017    PVI 4-2011 (Cryo/PVI + roof line + CTI line) Re-do PVI 7-2011 (RFA/PVI + CFE + VIDYA + confirmed CTI line)   ? Ventricular tachycardia (H)     Past Surgical History:   Procedure Laterality Date   ? CARDIAC DEFIBRILLATOR PLACEMENT     ? CARDIOVERSION  07/11/2018    x20, last 2/12/15, 10/2015, 11/18/16, 6/16/17 by Lauren Foster CNP   ? CARDIOVERSION  07/11/2018   ? COLONOSCOPY N/A 4/28/2017    Procedure: COLONOSCOPY with 2 ascending polyps and 1 transverse polyp;  Surgeon: Jose Whittington MD;  Location: [facility] GI;  Service:    ? CV CORONARY ANGIOGRAM N/A 9/20/2017    Procedure: Coronary Angiogram;  Surgeon: Sergio Cervantes MD;  Location: [facility] Cath Lab;  Service:    ? EP ICD INSERT     ? FRACTURE SURGERY Left     wrist   ? INGUINAL HERNIA  REPAIR Left 1967    while in the [employer] in [employer] after 13 month in [place]   ? INSERT / REPLACE / REMOVE PACEMAKER     ? left hand surgery---tendon repair     ? NE ABLATE HEART DYSRHYTHM FOCUS  04/2011    Catheter Ablation Atrial Fibrillation PVI Apr 2011 (Cryo+RF-PVI + roof line + CTI line)   ? NE ABLATE HEART DYSRHYTHM FOCUS  07/2011    Re-do PVI Jul 2011 (RFA-PVI + CFE + VIDYA + confirmation of CTI line)   ? NE MYERS W/O FACETEC FORAMOT/DSKC 1/2 VRT SEG, CERVICAL      Laminectomy Lumbar;  Recorded: 03/09/2012;   ? TOTAL SHOULDER REPLACEMENT Right 03/03/2016    Dr. Abernathy of [facility]    Family History   Problem Relation Age of Onset   ? Cancer Mother      leukemia   ? Cancer Father      bladder   ? Cancer Sister      breast with lung met.   ? Aneurysm Sister    ? CABG Brother    ? CABG Brother    ? Valvular heart disease Brother      valve replacement    Social History     Social History   ? Marital status:      Spouse name: Neela   ? Number of children: N/A   ? Years of education: 12     Occupational History   ?  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 Smoking status: Former Smoker     Packs/day: 1.00     Years: 4.00     Types: Cigarettes     Quit date: 1/1/1968   ? Smokeless tobacco: Never Used   ? Alcohol use 1.2 oz/week     2 Cans of beer per week      Comment: 1 beer per week   ? Drug use: No   ? Sexual activity: Yes     Partners: Female     Birth control/ protection: Post-menopausal     Other Topics Concern   ? Not on file     Social History Narrative          Medications  Allergies   Current Outpatient Prescriptions   Medication Sig Dispense Refill   ? ACETAMINOPHEN (TYLENOL ORAL) Take 1,000 mg by mouth 2 (two) times a day.      ? albuterol (PROAIR HFA;PROVENTIL HFA;VENTOLIN HFA) 90 mcg/actuation inhaler Inhale 2 puffs every 6 (six) hours as needed for wheezing. 1 Inhaler 11   ? amoxicillin (AMOXIL) 500 MG tablet Take prior to the Dentist      ? ascorbic acid (VITAMIN C) 1000 MG tablet Take 1,000 mg by mouth daily.     ? aspirin 81 MG EC tablet Take 1 tablet (81 mg total) by mouth daily. 30 tablet 11   ? atorvastatin (LIPITOR) 40 MG tablet Take 40 mg by mouth at bedtime.     ? budesonide-formoterol (SYMBICORT) 160-4.5 mcg/actuation inhaler Inhale 2 puffs 2 (two) times a day.     ? co-enzyme Q-10 30 mg capsule Take 100 mg by mouth daily.     ? doxycycline (VIBRAMYCIN) 100 MG capsule Take 1 capsule (100 mg total) by mouth 2 (two) times a day for 10 days. 20 capsule 0   ? furosemide (LASIX) 20 MG tablet TAKE 1 TABLET(20 MG) BY MOUTH DAILY 90 tablet 3   ? losartan (COZAAR) 50 MG tablet Take 1 tablet (50 mg total) by mouth daily. 90 tablet 1   ? magnesium chloride (MAG 64) 64 mg TbEC delayed-release tablet Take 1 tablet (64 mg total) by mouth 2 (two) times a day. 180 tablet 5   ? multivitamin (MULTIVITAMIN) per tablet Take 1 tablet by mouth daily.     ? omega 3-dha-epa-fish oil (FISH OIL) 1,000 mg (120 mg-180 mg) cap Take 2 tablets by mouth 2 (two) times a day.      ? polyethylene glycol (MIRALAX) 17 gram packet Take 17 g by mouth daily.     ? sotalol (BETAPACE) 80 MG tablet Take 160 mg by mouth 2 (two) times a day.  5   ? tiotropium (SPIRIVA) 18 mcg inhalation capsule Place 18 mcg into inhaler and inhale daily.     ? vardenafil (LEVITRA) 10 MG tablet Take 10 mg by mouth daily as needed for erectile dysfunction.     ? VITAMIN D2 50,000 unit capsule Take 50,000 Units by mouth 2 (two) times a week. SUN and WED  3   ? warfarin (COUMADIN/JANTOVEN) 2.5 MG tablet TAKE 5 MG TABLETS BY MOUTH M W F AND 2.5 MG TABLETS ALL OTHER DAYS. 90 tablet 0   ? warfarin (COUMADIN/JANTOVEN) 5 MG tablet Take as directed  0     No current facility-administered medications for this visit.       Allergies   Allergen Reactions   ? Adhesive Other (See Comments)     ADHESIVE TAPE; SKIN IRRITATION  Foam tape:  Skin removed with tape removal   ? Amiodarone      ADVERSE REACTION.  Sunlight  sensitivity.   ? Lisinopril Cough      Medical, surgical, family, social history, and medications were all reviewed and updated as necessary.   Lab Results    Chemistry CBC/INR CHOLESTROL   Lab Results   Component Value Date    CREATININE 1.04 10/25/2018    BUN 24 10/25/2018     10/25/2018    K 4.4 10/25/2018     (H) 10/25/2018    CO2 19 (L) 10/25/2018     Creatinine (mg/dL)   Date Value   10/25/2018 1.04   08/30/2018 1.06   07/06/2018 1.17   05/17/2018 1.11     Lab Results   Component Value Date     (H) 10/04/2017    Lab Results   Component Value Date    WBC 4.8 10/25/2018    HGB 12.1 (L) 10/25/2018    HCT 37.2 (L) 10/25/2018    MCV 98 10/25/2018     10/25/2018     Lab Results   Component Value Date    INR 2.2 10/30/2018      Lab Results   Component Value Date    CHOL 142 05/02/2018    HDL 53 05/02/2018    LDLCALC 80 05/02/2018    TRIG 46 05/02/2018          Total Time- 25 minutes with greater than 50% spent talking to patient and family regarding patient's relevant diagnoses.  This note has been dictated using voice recognition software. Any grammatical, typographical, or context distortions are unintentional and inherent to the software.    Erica Hansen RN,  [facility]   Electrophysiology  [phone]

## 2021-06-26 NOTE — PROGRESS NOTES
Progress Notes by Erica Hansen CNP at 8/14/2018 10:30 AM     Author: Erica Hansen CNP Service: -- Author Type: Nurse Practitioner    Filed: 8/14/2018  3:16 PM Encounter Date: 8/14/2018 Status: Signed    : Erica Hansen CNP (Nurse Practitioner)          Click to link to [facility] Heart [facility] HEART [facility] ELECTROPHYSIOLOGY NOTE      Assessment/Recommendations   Assessment/Plan:    There are no diagnoses linked to this encounter.  Persistent atrial fibrillation and reporting more energy and better tolerance of activity since cardioversion.  QTC okay but at upper range of normal at time of cardioversion.  Cautioned not to take any additional QT lengthening medications without close monitoring.  Discussed that this is most commonly antibiotics and antidepressants.  Okay with amoxicillin he takes as needed.  If he fails to maintain sinus rhythm with sotalol, I  discussed, he will be switched to rate control.  ICD dual chamber--- and device functioning appropriately.  Coronary artery disease due to calcified coronary lesion and on Plavix plus warfarin.  Plavix is related to previous stents.  If he wants to be off of Plavix would need to check with Dr. Renee before doing this.  [id number] score of 3 and tell me that he is scheduled for watchman placement on September 12 and they believe that he will be in a study to get this.  He is asking about anticoagulation protocol but it will depend upon whether he is routine placement versus study.  He is okay with clarifying anticoagulation requirements at the time of procedure.  Follow up in clinic as needed with watchman placement.  He normally follows with Dr. Renee and should be assigned to see him at post watchman follow-up.     History of Present Illness    Mr. Buck Sinclair is a very pleasant 73 y.o. male who comes in today for EP follow-up after cardioversion on 7/11/2018 for persistent atrial fib.  Buck Sinclair  has a known history of persistent atrial fib and cardiomyopathy.  He had a recent nuclear stress test and we reviewed those results in clinic today.  He has had frequent cardioversions for persistent A. fib and on increasing doses of sotalol.  He has failed amiodarone and recently increase sotalol to 160 mg p.o. every 12 hours.  QTc close to limit.  He has maintained sinus rhythm since cardioversion and both he and his wife are adamant that obed feels better when he is in sinus rhythm.  Dr. Renee is not optimistic that sotalol will keep him in sinus rhythm for a long period as having more aggressive progression of A. fib  over time.  He denies any complaints today.  His wife states that he is scheduled to get the left atrial appendage occlusion device on September 12 but I see nothing an appointment calendar related to this.  They stay here in [place] in the winter so closer follow-up (with Dr. Renee) will not be a problem.  This visit will serve as history and physical for left atrial appendage device implant if timing appropriate.  See problem list for more details.  Medical, past medical, surgical and social history reviewed and updated.  Meds and allergies reviewed.   No personal or family history of adverse reactions to anesthesia or abnormal bleeding with surgery.    Cardiographics (personally reviewed):  Results for orders placed during the hospital encounter of 12/04/17   Echo KANDICE W [place] [[id number]] 12/04/2017    Narrative   The estimated left ventricular ejection fraction is 50%. This represents   a mildly decreased ejection fraction    Right Ventricle: Normal systolic function. The right ventricle is mildly   dilated.    Normal appearing left atrial appendage.    Mild tricuspid valve regurgitation. Mild pulmonary hypertension present.          Results for orders placed in visit on 07/06/18   NM Pharmacologic Stress Test    Narrative   The pharmacologic nuclear stress test is abnormal.    There is a  small area of mixed ischemia and nontransmural infarction in   the apical segment(s) of the left ventricle.    The left ventricular ejection fraction is 61%.    The patient is at a low risk of future cardiac ischemic events.    When compared to the images of 9/13/2017, the ejection fraction has   increased from 53% to 61%, otherwise, the findings are similar.        Device check shows leads stable and battery ok.  Device functioning appropriately.   Atrial pacing 0% and Ventricular pacing4%.  AT/AFib burden 0.2% with one episode of A. fib on August 10, 2018 which was 1 hour and 10 minutes.  Ventricular response greater than or equal to 120 10-15% of the time. No ventr arrhythmias.      Results for orders placed or performed during the hospital encounter of 07/11/18   ECG 12 lead   Result Value Ref Range    SYSTOLIC BLOOD PRESSURE  mmHg    DIASTOLIC BLOOD PRESSURE  mmHg    VENTRICULAR RATE 59 BPM    ATRIAL RATE 59 BPM    P-R INTERVAL 220 ms    QRS DURATION 100 ms    Q-T INTERVAL 498 ms    QTC CALCULATION (BEZET) 493 ms    P Axis 93 degrees    R AXIS -13 degrees    T AXIS 98 degrees    MUSE DIAGNOSIS       Sinus bradycardia with marked sinus arrhythmia with 1st degree A-V block  Low voltage QRS  Incomplete right bundle branch block  Inferior infarct (cited on or before 12-FEB-2015)  Abnormal ECG  When compared with ECG of 18-JUN-2018 09:08,  Sinus rhythm has replaced Atrial fibrillation  Incomplete right bundle branch block is now Present  Nonspecific T wave abnormality, improved in Lateral leads  Confirmed by AGUSTIN ESTRADA MD LOC:[facility] ([id number]) on 7/11/2018 2:53:48 PM            Problem List:  Patient Active Problem List   Diagnosis   ? Dyslipidemia, goal LDL below 70   ? Essential hypertension   ? Persistent Atrial Fibrillation   ? ICD (implantable cardioverter-defibrillator), dual, in situ   ? Status post catheter ablation of atrial fibrillation   ? Coronary artery disease due to calcified coronary lesion   ? COPD,  group D, by GOLD 2017 classification (H)       Physical Examination Review of Systems   Vitals:    08/14/18 1006   BP: 132/70   Pulse: 63   Resp: 12     Body mass index is 36.08 kg/(m^2).  Wt Readings from Last 3 Encounters:   08/14/18 (!) 281 lb (127.5 kg)   07/11/18 (!) 284 lb 4.8 oz (129 kg)   07/06/18 (!) 282 lb (127.9 kg)     General Appearance:   Alert, well-appearing and in no acute distress.   HEENT: Atraumatic, normocephalic.  No scleral icterus, normal conjunctivae; mucous membranes pink and moist.     Chest: Chest symmetric, spine straight.   Lungs:   Respirations unlabored: Lungs are clear to auscultation.   Cardiovascular:   Normal first and second heart sounds with no murmurs, rubs, or gallops.  Regular, regular.  Radial and posterior tibial pulses are intact.  Normal JVD, no edema.       Extremities: No cyanosis or clubbing   Musculoskeletal: Moves all extremities   Skin: Warm, dry, intact.    Neurologic: Mood and affect are appropriate, alert and oriented to person, place, time, and situation    General: WNL  Eyes: WNL  Ears/Nose/Throat: WNL  Lungs: WNL  Heart: WNL  Stomach: WNL  Bladder: WNL  Muscle/Joints: WNL  Skin: WNL  Nervous System: WNL  Mental Health: WNL     Blood: WNL       Medical History  Surgical History Family History Social History   Past Medical History:   Diagnosis Date   ? Anemia    ? BPH (benign prostatic hyperplasia)    ? COPD (chronic obstructive pulmonary disease) (H)    ? Coronary artery disease due to calcified coronary lesion    ? Dyslipidemia, goal LDL below 70    ? Essential hypertension    ? History of cardiomyopathy    ? History of transfusion    ? Persistent atrial fibrillation (H)    ? Pneumonia of left lower lobe due to infectious organism (H) 10/4/2017   ? Skin cancer of trunk    ? Status post catheter ablation of atrial fibrillation 6/7/2017    PVI 4-2011 (Cryo/PVI + roof line + CTI line) Re-do PVI 7-2011 (RFA/PVI + CFE + VIDYA + confirmed CTI line)   ? Ventricular  tachycardia (H)     Past Surgical History:   Procedure Laterality Date   ? CARDIAC DEFIBRILLATOR PLACEMENT     ? CARDIOVERSION  07/11/2018    x20, last 2/12/15, 10/2015, 11/18/16, 6/16/17 by Lauren Foster CNP   ? COLONOSCOPY N/A 4/28/2017    Procedure: COLONOSCOPY with 2 ascending polyps and 1 transverse polyp;  Surgeon: Jose Whittington MD;  Location: [facility] GI;  Service:    ? CV CORONARY ANGIOGRAM N/A 9/20/2017    Procedure: Coronary Angiogram;  Surgeon: Sergio Cervantes MD;  Location: [facility] Cath Lab;  Service:    ? EP ICD INSERT     ? FRACTURE SURGERY Left     wrist   ? INGUINAL HERNIA REPAIR Left 1967    while in the [employer] in [place] after 13 month in [place]   ? INSERT / REPLACE / REMOVE PACEMAKER     ? left hand surgery---tendon repair     ? MS ABLATE HEART DYSRHYTHM FOCUS  04/2011    Catheter Ablation Atrial Fibrillation PVI Apr 2011 (Cryo+RF-PVI + roof line + CTI line)   ? MS ABLATE HEART DYSRHYTHM FOCUS  07/2011    Re-do PVI Jul 2011 (RFA-PVI + CFE + VIDYA + confirmation of CTI line)   ? MS MYERS W/O FACETEC FORAMOT/DSKC 1/2 VRT SEG, CERVICAL      Laminectomy Lumbar;  Recorded: 03/09/2012;   ? TOTAL SHOULDER REPLACEMENT Right 03/03/2016    Dr. Abernathy of [facility]    Family History   Problem Relation Age of Onset   ? Cancer Mother      leukemia   ? Cancer Father      bladder   ? Cancer Sister      breast with lung met.   ? Aneurysm Sister    ? CABG Brother    ? CABG Brother    ? Valvular heart disease Brother      valve replacement    Social History     Social History   ? Marital status:      Spouse name: Neela   ? Number of children: N/A   ? Years of education: 12     Occupational History   ?  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 Smoking status: Former Smoker     Packs/day: 0.50     Years: 4.00     Types: Cigarettes     Quit date: 1/1/1968   ? Smokeless tobacco: Never Used   ? Alcohol use 1.2 oz/week     2 Cans of beer per week       Comment: 1 beer per week   ? Drug use: No   ? Sexual activity: Yes     Partners: Female     Birth control/ protection: Post-menopausal     Other Topics Concern   ? Not on file     Social History Narrative          Medications  Allergies   Current Outpatient Prescriptions   Medication Sig Dispense Refill   ? ACETAMINOPHEN (TYLENOL ORAL) Take 1,000 mg by mouth 2 (two) times a day.      ? albuterol (PROAIR HFA;PROVENTIL HFA;VENTOLIN HFA) 90 mcg/actuation inhaler Inhale 2 puffs every 6 (six) hours as needed for wheezing. 1 Inhaler 11   ? amoxicillin (AMOXIL) 500 MG tablet Take prior to the Dentist     ? ascorbic acid (VITAMIN C) 1000 MG tablet Take 1,000 mg by mouth daily.     ? atorvastatin (LIPITOR) 40 MG tablet Take 40 mg by mouth at bedtime.     ? budesonide-formoterol (SYMBICORT) 160-4.5 mcg/actuation inhaler Inhale 2 puffs 2 (two) times a day.     ? clopidogrel (PLAVIX) 75 mg tablet Take 1 tablet (75 mg total) by mouth daily. 30 tablet 11   ? co-enzyme Q-10 30 mg capsule Take 100 mg by mouth daily.     ? furosemide (LASIX) 20 MG tablet TAKE 1 TABLET(20 MG) BY MOUTH DAILY 90 tablet 3   ? losartan (COZAAR) 50 MG tablet Take 1 tablet (50 mg total) by mouth daily. 90 tablet 1   ? magnesium chloride (MAG 64) 64 mg TbEC delayed-release tablet Take 1 tablet (64 mg total) by mouth 2 (two) times a day. 180 tablet 5   ? multivitamin (MULTIVITAMIN) per tablet Take 1 tablet by mouth daily.     ? omega 3-dha-epa-fish oil (FISH OIL) 1,000 mg (120 mg-180 mg) cap Take 2 tablets by mouth 2 (two) times a day.      ? polyethylene glycol (MIRALAX) 17 gram packet Take 17 g by mouth daily.     ? sotalol (BETAPACE) 160 MG tablet Take 1 tablet (160 mg total) by mouth every 12 (twelve) hours.  0   ? tiotropium (SPIRIVA) 18 mcg inhalation capsule Place 18 mcg into inhaler and inhale daily.     ? vardenafil (LEVITRA) 10 MG tablet Take 10 mg by mouth daily as needed for erectile dysfunction.     ? VITAMIN D2 50,000 unit capsule Take  50,000 Units by mouth 2 (two) times a week. SUN and WED  3   ? warfarin (COUMADIN) 2.5 MG tablet Take 5 mg M,W,F and 2.5 mg all other days. 90 tablet 0     No current facility-administered medications for this visit.       Allergies   Allergen Reactions   ? Adhesive Other (See Comments)     ADHESIVE TAPE; SKIN IRRITATION   ? Amiodarone      ADVERSE REACTION.  Sunlight sensitivity.   ? Lisinopril Cough      Medical, surgical, family, social history, and medications were all reviewed and updated as necessary.   Lab Results    Chemistry CBC/INR CHOLESTROL   Lab Results   Component Value Date    CREATININE 1.17 07/06/2018    BUN 24 07/06/2018     07/06/2018    K 4.9 07/11/2018     (H) 07/06/2018    CO2 25 07/06/2018     Creatinine (mg/dL)   Date Value   07/06/2018 1.17   05/17/2018 1.11   05/02/2018 1.11   12/18/2017 1.07     Lab Results   Component Value Date     (H) 10/04/2017    Lab Results   Component Value Date    WBC 5.0 12/18/2017    HGB 13.2 (L) 12/18/2017    HCT 41.5 12/18/2017    MCV 96 12/18/2017     12/18/2017     Lab Results   Component Value Date    INR 2.80 (!) 08/07/2018      Lab Results   Component Value Date    CHOL 142 05/02/2018    HDL 53 05/02/2018    LDLCALC 80 05/02/2018    TRIG 46 05/02/2018          Total Time- 25 minutes with greater than 50% spent talking to patient and family regarding patient's relevant diagnoses.  This note has been dictated using voice recognition software. Any grammatical, typographical, or context distortions are unintentional and inherent to the software.    Erica Hansen RN,  [facility]   Electrophysiology  [phone]

## 2021-06-26 NOTE — PROGRESS NOTES
Progress Notes by Erica Hansen CNP at 6/12/2018 10:30 AM     Author: Erica Hansen CNP Service: -- Author Type: Nurse Practitioner    Filed: 6/13/2018 10:29 AM Encounter Date: 6/12/2018 Status: Addendum    : Erica Hansen CNP (Nurse Practitioner)    Related Notes: Original Note by Erica Hansen CNP (Nurse Practitioner) filed at 6/12/2018 11:55 AM          Click to link to [facility]     [facility] HEART CARE ELECTROPHYSIOLOGY NOTE      Assessment/Recommendations   Assessment/Plan:    Diagnoses and all orders for this visit:    Persistent atrial fibrillation and had early reversion back into atrial fib on sotalol 120 mg p.o. every 12 hours.  Both salvador and his wife would like to do a trial of 160 mg p.o. every 12 hours and would need twelve-lead EKG 2 days after start for QTC check.  I would stop Cartia and metoprolol succinate if he was restarted on sotalol.  I would do a remote check in 1 week to see if he is in A. fib if so would do cardioversion knowing that it is possible he could revert back into A. fib.  He may then need to accept rate control.  They both want a trial of sinus on higher dose of sotalol.  I believe that Dr. Renee was concerned about high percentage of V pacing, but he has seemed to tolerate this.  He has had about 10 months of high percentage of V pacing so could repeat echo.  There has been a significant decrease in V pacing from 75% to about 30% without any mode switch and just stopping sotalol and going to metoprolol succinate 50 mg daily.  Dyspnea on exertion and significant decrease in energy level have been life altering for Salvador.  He normally follows with Dr. Renee and I want to consult with him before making medication changes.  They are agreeable to this.  I will call him after I talked to Dr. Renee.    Cardiomyopathy and certainly symptoms of fatigue and dyspnea on exertion could be heart failure symptoms though it does not appear  to be.  His optivol is periodically elevated--- 3 times over the last year.  Once was likely related to pneumonia and other 2 are probably due to sodium indiscretion.  He currently is in a sodium indiscretion period.  Okay to use furosemide 40 mg instead of 20 once a week but to call if needed more often.  No signs or symptoms of acute decompensated heart failure today.  Discussed importance of low-sodium diet today.    Essential hypertension and well-controlled.  To call if lightheaded dizzy or off balance if I end up stopping metoprolol and switching him to sotalol as discussed significant increase in beta-blocker dose.    ICD (implantable cardioverter-defibrillator), dual, in situ and device functioning appropriately.  Mode switch was made to VVIR today to help decrease V pacing and will need to be reset to DDIR if repeat cardioversion is done.    Coronary artery disease due to calcified coronary lesion and no anginal symptoms.  LIMA and fatigue could be anginal equivalent symptoms as diabetic.    Other orders  -     amoxicillin (AMOXIL) 500 MG tablet; Take prior to the Dentist    [id number] score of 3 and on chronic warfarin and does home weekly INR monitoring.  He tells me all his INRs have been therapeutic over the last month.  His INR this week was 2.3.  He bruises easily and has problems with bleeding and is complaining of coughing up some blood with a URI symptoms a few weeks ago.  He really wants to get off of warfarin if possible.  See HPI for details regarding left atrial appendage occlusion device.  Follow up in clinic will be determined after talking to Dr. Renee regarding plan.  Addendum:  Dr. Renee replied to my clinical inquiry that doubtful that sotalol with cardioversion will be effective long-term, but ok with trial.  I conveyed this to Neela and Buck.  They would still like to proceed with trial.  They can do twelve-lead EKG in [facility] on Monday, June 18 and would like 8:30 in the  morning as once early time.  Therefore on Saturday, June 16 to stop Cartia in the morning and stop metoprolol succinate which he takes that at night and start sotalol 160 mg 1 tablet orally every 12 hours.  Will do QTC check on 12-lead EKG on Monday the 18th as will have 5 doses then.  Recommended to do cardioversion the week of June 25 to give medication time to convert him if it will do it, but will likely need DCCV.  To do remote device check the working day before cardioversion.  Orders placed for above med changes as well as cardioversion.  Palma Vega LPN will need to arrange for remote device check working day before cardioversion.     History of Present Illness    Mr. Buck Sinclair is  73 y.o. male who is somewhat disgusted and withdrawn today in clinic.  He is accompanied by his wife.  He is being seen in clinic for follow-up regarding persistent atrial fib as failed cardioversion, which was on 5/22/2018 with early reversion back into atrial fib within days of cardioversion and was on sotalol 120 mg p.o. twice daily.  His wife tells me that they do not understand why Dr. Renee increased the sotalol and then stopped sotalol 160 mg twice daily  as he went back to regular rhythm for a few day.  He has been in persistent atrial fib since at least Jessica 3 and on metoprolol succinate 50 mg daily plus Cartia 240 mg daily.  They are both adamant that Salvador feels better in regular rhythm and I discussed natural progression and how can be difficult to maintain normal rhythm as he has a long history of atrial fib.  See above for more details.  Salvador is also upset as he feels he was dropped by research as he was interested in getting the [place] [employer] left atrial appendage device and was told that would be available this summer through his study.  He is willing to be in a research study.  I do not know if there is a study running in which we can choose which left atrial appendage device he gets.  He tells me his left  atrial appendage was too large to receive the watchman device.  I will follow up with Monique Mack on this and either ask her to call Salvador or I will get back to him on this.  I will ask that Monique connect with me as to her preferred method of communication.   Buck Sinclair has a known history of persistent atrial fibrillation and a long history of multiple cardioversions and has failed amiodarone and has been on sotalol 120 mg p.o. every 12 hours.  It looks like he has had 75% V pacing on sotalol 120 mg p.o. twice daily plus Cartia 240 mg p.o. daily since September 2017.  He had a high percentage of V pacing for 3-4 months when he had his echo in December and EF 50% and mildly decreased which is his norm.  He has had mild cardiomyopathy for years.  He has an Medtronic 2-lead ICD.  He had a device check prior to this visit.  See Dr. Renee's note of 12/28/2017 for more details regarding medical history, which includes coronary artery disease.    Cardiographics (personally reviewed):  Results for orders placed during the hospital encounter of 12/04/17   Echo KANDICE W Bubble [ECH12] 12/04/2017    Narrative   The estimated left ventricular ejection fraction is 50%. This represents   a mildly decreased ejection fraction    Right Ventricle: Normal systolic function. The right ventricle is mildly   dilated.    Normal appearing left atrial appendage.    Mild tricuspid valve regurgitation. Mild pulmonary hypertension present.        Results for orders placed during the hospital encounter of 09/13/17   NM Pharmacologic Stress Test    Addendum   The pharmacologic nuclear stress test is abnormal.   There is a small area of ischemia in the distal anterior and  anterior-apical segment(s) of the left ventricle.   Increased stress to rest cavity ratio of 1.53 is consistent with diffuse  subendocardial ischemia.   The left ventricular ejection fraction is 53%.   When compared to the images of 4/30/2014, there have been changes  noted;  the distal anterior ischemia and TID are new   The patient is at a high risk of future cardiac ischemic events given  the perfusion defect and the TID.        Christi Saunders MD 9/13/2017  3:27 PM          Narrative   The pharmacologic nuclear stress test is abnormal.    There is a small area of ischemia in the distal anterior and   anterior-apical segment(s) of the left ventricle.    The left ventricular ejection fraction is 53%.    When compared to the images of 4/30/2014, there have been changes noted;   the distal anterior ischemia is new    The patient is at a low risk of future cardiac ischemic events.         Medtronic ICD check shows leads stable and battery ok.  Device functioning appropriately.   Atrial pacing 25 % and Ventricular pacing 32 %.  AT/AFib burden 100% since 6-3 -18.  Ventricular rate well controlled with ventricular rate greater than or equal to 120 5-10% of the time.  A. fib burden has gone from 10% to 67%.  No ventr arrhythmias.    Results for orders placed or performed during the hospital encounter of 05/22/18   ECG 12 lead   Result Value Ref Range    SYSTOLIC BLOOD PRESSURE  mmHg    DIASTOLIC BLOOD PRESSURE  mmHg    VENTRICULAR RATE 64 BPM    ATRIAL RATE 78 BPM    P-R INTERVAL  ms    QRS DURATION 102 ms    Q-T INTERVAL 482 ms    QTC CALCULATION (BEZET) 497 ms    P Axis  degrees    R AXIS -13 degrees    T AXIS 167 degrees    MUSE DIAGNOSIS       Electronic atrial pacemaker  long av delay  Inferior infarct , age undetermined  ST & T wave abnormality, consider anterolateral ischemia or digitalis effect  Long QT  Abnormal ECG  When compared with ECG of 04-OCT-2017 07:48,  Inferior infarct is now Present  ST now depressed in Anterior leads  T wave inversion more evident in Anterolateral leads  Confirmed by EPIFANIO JO, LES LOC: ([id number]) on 5/23/2018 2:25:13 PM            Problem List:  Patient Active Problem List   Diagnosis   ? Dyslipidemia, goal LDL below 70   ? Essential  hypertension   ? Persistent Atrial Fibrillation   ? ICD (implantable cardioverter-defibrillator), dual, in situ   ? Status post catheter ablation of atrial fibrillation   ? Coronary artery disease due to calcified coronary lesion   ? COPD, group D, by GOLD 2017 classification       Physical Examination Review of Systems   Vitals:    06/12/18 0959   BP: 102/68   Pulse: 68   Resp: 16     Body mass index is 35 kg/(m^2).  Wt Readings from Last 3 Encounters:   06/12/18 (!) 280 lb (127 kg)   05/22/18 (!) 270 lb (122.5 kg)   04/11/18 (!) 282 lb 8 oz (128.1 kg)     General Appearance:   Alert, well-appearing and in no acute distress.   HEENT: Atraumatic, normocephalic.  No scleral icterus, normal conjunctivae; mucous membranes pink and moist.     Chest: Chest symmetric, spine straight.   Lungs:   Respirations unlabored: Lungs are clear to auscultation.   Cardiovascular:   Normal first and second heart sounds with no murmurs, rubs, or gallops.  Irregular, irregular.  Radial and posterior tibial pulses are intact.  Normal JVD, no edema.       Extremities: No cyanosis or clubbing   Musculoskeletal: Moves all extremities   Skin: Warm, dry, intact.    Neurologic: Mood and affect are appropriate, alert and oriented to person, place, time, and situation    General: WNL  Eyes: WNL  Ears/Nose/Throat: WNL  Lungs: Cough  Heart: Shortness of Breath with activity, Irregular Heartbeat  Stomach: WNL  Bladder: WNL  Muscle/Joints: WNL  Skin: WNL  Nervous System: WNL  Mental Health: WNL     Blood: WNL       Medical History  Surgical History Family History Social History   Past Medical History:   Diagnosis Date   ? Anemia    ? BPH (benign prostatic hyperplasia)    ? COPD (chronic obstructive pulmonary disease)    ? Coronary artery disease due to calcified coronary lesion    ? Dyslipidemia, goal LDL below 70    ? Essential hypertension    ? History of cardiomyopathy    ? History of transfusion    ? Persistent atrial fibrillation    ? Pneumonia  of left lower lobe due to infectious organism 10/4/2017   ? Skin cancer of trunk    ? Status post catheter ablation of atrial fibrillation 6/7/2017    PVI 4-2011 (Cryo/PVI + roof line + CTI line) Re-do PVI 7-2011 (RFA/PVI + CFE + VIDYA + confirmed CTI line)   ? Ventricular tachycardia     Past Surgical History:   Procedure Laterality Date   ? CARDIAC DEFIBRILLATOR PLACEMENT     ? CARDIOVERSION      x20, last 2/12/15, 10/2015, 11/18/16, 6/16/17 by Lauren Foster CNP   ? COLONOSCOPY N/A 4/28/2017    Procedure: COLONOSCOPY with 2 ascending polyps and 1 transverse polyp;  Surgeon: Jose Whittington MD;  Location: [facility] GI;  Service:    ? CV CORONARY ANGIOGRAM N/A 9/20/2017    Procedure: Coronary Angiogram;  Surgeon: Sergio Cervantes MD;  Location: [facility] Lab;  Service:    ? EP ICD INSERT     ? FRACTURE SURGERY Left     wrist   ? INGUINAL HERNIA REPAIR Left 1967    while in the [employer] in [facility] after 13 month in [place]   ? INSERT / REPLACE / REMOVE PACEMAKER     ? IA ABLATE HEART DYSRHYTHM FOCUS  04/2011    Catheter Ablation Atrial Fibrillation PVI Apr 2011 (Cryo+RF-PVI + roof line + CTI line)   ? IA ABLATE HEART DYSRHYTHM FOCUS  07/2011    Re-do PVI Jul 2011 (RFA-PVI + CFE + VIDYA + confirmation of CTI line)   ? IA MYERS W/O FACETEC FORAMOT/DSKC 1/2 VRT SEG, CERVICAL      Laminectomy Lumbar;  Recorded: 03/09/2012;   ? TOTAL SHOULDER REPLACEMENT Right 03/03/2016    Dr. Abernathy of [facility]    Family History   Problem Relation Age of Onset   ? Cancer Mother      leukemia   ? Cancer Father      bladder   ? Cancer Sister     Social History     Social History   ? Marital status:      Spouse name: Neela   ? Number of children: N/A   ? Years of education: 12     Occupational History   ?  Retired   ? [employer]      [employer]     Social History Main Topics   ? Smoking status: Former Smoker     Packs/day: 0.50     Years: 4.00     Types: Cigarettes     Quit date: 1/1/1968   ? Smokeless  tobacco: Never Used   ? Alcohol use 1.2 oz/week     2 Cans of beer per week      Comment: 1 beer per week   ? Drug use: No   ? Sexual activity: Yes     Partners: Female     Birth control/ protection: Post-menopausal     Other Topics Concern   ? Not on file     Social History Narrative          Medications  Allergies   Current Outpatient Prescriptions   Medication Sig Dispense Refill   ? ACETAMINOPHEN (TYLENOL ORAL) Take 1,000 mg by mouth 2 (two) times a day.      ? albuterol (PROAIR HFA;PROVENTIL HFA;VENTOLIN HFA) 90 mcg/actuation inhaler Inhale 2 puffs every 6 (six) hours as needed for wheezing. 1 Inhaler 11   ? amoxicillin (AMOXIL) 500 MG tablet Take prior to the Dentist     ? ascorbic acid (VITAMIN C) 1000 MG tablet Take 1,000 mg by mouth daily.     ? atorvastatin (LIPITOR) 40 MG tablet Take 40 mg by mouth at bedtime.     ? budesonide-formoterol (SYMBICORT) 160-4.5 mcg/actuation inhaler Inhale 2 puffs 2 (two) times a day.     ? clopidogrel (PLAVIX) 75 mg tablet Take 1 tablet (75 mg total) by mouth daily. 30 tablet 11   ? co-enzyme Q-10 30 mg capsule Take 100 mg by mouth daily.     ? diltiazem (CARTIA XT) 240 MG 24 hr capsule Take 1 capsule (240 mg total) by mouth daily. 90 capsule 1   ? furosemide (LASIX) 20 MG tablet TAKE 1 TABLET(20 MG) BY MOUTH DAILY 90 tablet 1   ? losartan (COZAAR) 50 MG tablet Take 1 tablet (50 mg total) by mouth daily. 90 tablet 1   ? magnesium chloride (MAG 64) 64 mg TbEC delayed-release tablet Take 1 tablet (64 mg total) by mouth 2 (two) times a day. 60 tablet 0   ? metoprolol succinate (TOPROL XL) 50 MG 24 hr tablet Take 1 tablet (50 mg total) by mouth daily. 90 tablet 1   ? multivitamin (MULTIVITAMIN) per tablet Take 1 tablet by mouth daily.     ? omega 3-dha-epa-fish oil (FISH OIL) 1,000 mg (120 mg-180 mg) cap Take 2 tablets by mouth 2 (two) times a day.      ? polyethylene glycol (MIRALAX) 17 gram packet Take 17 g by mouth daily.     ? tiotropium (SPIRIVA) 18 mcg inhalation capsule  Place 18 mcg into inhaler and inhale daily.     ? vardenafil (LEVITRA) 10 MG tablet Take 10 mg by mouth daily as needed for erectile dysfunction.     ? VITAMIN D2 50,000 unit capsule Take 50,000 Units by mouth 2 (two) times a week. SUN and WED  3   ? warfarin (COUMADIN) 2.5 MG tablet Take 5 mg M,W,F and 2.5 mg all other days. 90 tablet 0     No current facility-administered medications for this visit.       Allergies   Allergen Reactions   ? Adhesive Other (See Comments)     ADHESIVE TAPE; SKIN IRRITATION   ? Amiodarone      ADVERSE REACTION.  Sunlight sensitivity.   ? Lisinopril Cough      Medical, surgical, family, social history, and medications were all reviewed and updated as necessary.   Lab Results    Chemistry CBC/INR CHOLESTROL   Lab Results   Component Value Date    CREATININE 1.11 05/17/2018    BUN 26 05/17/2018     05/17/2018    K 4.6 05/22/2018     (H) 05/17/2018    CO2 23 05/17/2018     Creatinine (mg/dL)   Date Value   05/17/2018 1.11   05/02/2018 1.11   12/18/2017 1.07   10/06/2017 1.02     Lab Results   Component Value Date     (H) 10/04/2017    Lab Results   Component Value Date    WBC 5.0 12/18/2017    HGB 13.2 (L) 12/18/2017    HCT 41.5 12/18/2017    MCV 96 12/18/2017     12/18/2017     Lab Results   Component Value Date    INR 3.10 (!) 06/05/2018    INR 3.10 (!) 06/05/2018      Lab Results   Component Value Date    CHOL 142 05/02/2018    HDL 53 05/02/2018    LDLCALC 80 05/02/2018    TRIG 46 05/02/2018          Greater than than 60 minutes were spent face to face in this visit discussing diagnoses as listed above, counseling, and coordination of care.    This note has been dictated using voice recognition software. Any grammatical, typographical, or context distortions are unintentional and inherent to the software.    Erica Hansen RN,  [facility]   Electrophysiology  [phone]

## 2021-06-26 NOTE — PROGRESS NOTES
Progress Notes by Leana Walton RN at 8/22/2018  2:20 PM     Author: Leana Walton RN Service: -- Author Type: Registered Nurse    Filed: 8/22/2018  2:21 PM Encounter Date: 8/22/2018 Status: Signed    : Leana Walton RN (Registered Nurse)       MD Monique Shah        Cc: Leana Walton RN                     ASA 81 and Eliquis would work, if that's ok with the protocol     Thanks            Previous Messages       ----- Message -----      From: Monique Martinez      Sent: 8/22/2018  10:23 AM        To: Melinda Gong MD, Leana Walton RN   Subject: RE: LAAO [place] Patient for Implant on 08.*     Would you like him to start Aspirin in place of plavix?     For [place] he needs be on a DOAC, do you have a preference? Eliquis or Xarelto?     ----- Message -----      From: Melinda Gong MD      Sent: 8/21/2018   3:58 PM        To: Monique Martinez   Subject: RE: LAAO [place] Patient for Implant on 08.*     That's fine, he can stop the plavix.     Thanks     ----- Message -----      From: Monique Martinez      Sent: 8/21/2018   1:27 PM        To: Melinda Gong MD   Subject: LAAO [place] Patient for Implant on 08.30       Dr. Gong,     Mr. Sinclair is scheduled for LAAO implant with you on 08.30.2018. He will be a part of the PINNACLE FLX Study due to his large appendage size.     The patient is currently on Plavix for post PCI on 09.20.2017. Per the study protocol he should discontinue plavix 7 days before implant. Would you be okay with discontinuing Plavix on 08.23? He is about 1 month shy of 12 months post PCI.     Thanks,   Monique

## 2021-06-26 NOTE — PROGRESS NOTES
Progress Notes by Monique Martinez at 8/29/2018  1:00 PM     Author: Monique Martinez Service: -- Author Type: Research Associate    Filed: 8/29/2018  3:26 PM Encounter Date: 8/29/2018 Status: Signed    : Monique Martinez (Research Associate)         Study Description: The primary objective of this study is to establish the safety and effectiveness of the WATCHMAN FLX? Left Atrial Appendage Closure (LAAC) Device for subjects with non-valvular atrial fibrillation who are eligible for anticoagulation therapy to reduce the risk of stroke.    Buck jett 73 y.o. male was seen in clinic 08/29/18 for his enrollment visit in the PINNACLE FLX Study.    Demographics  Ethnicity: Not /  Race:       General Medical History:  GI Bleed: No   Recurrent Anemia: Yes  Anemia requiring a transfusion: No  History of other bleed requiring transfusion: No, patient had an episode of hemoptysis in October 2017, this event did not require a transfusion.  Hyperlipidemia: Yes  Diabetes: No  Abnormal Renal function: No  Chronic Dialysis: No  Renal Transplant:No  Serum creatinine >199 umol/L: No  Chronic Hepatic disease: No  Biochemical evidence of significant hepatic derangement: No  Alcohol Use:Yes (1-2 times/week)  Current or former smoker: Yes Former    Cardiovascular Medical History:  Previous AF Ablation: Yes (18 Jul 2011, RF ablation and PVI with cryo and RF on 04 Apr 2011 )  MAZE Procedure: No  Cardioversion: Yes (External on 11 Jul 2018)  Coronary Artery disease: Yes   CABG: No  Angioplasty with intervention: No   Drug eluting stent intervention: Yes (20 Sep 2017)  Other Stent Intervention: No   Angina Pectoris/Chest pain: No   Myocardial infarction: No   Hypertension: Yes (Stable)  Aneurysm: no   Congestive Heart Failure: No   LV Dysfunction: No  Cardiomyopathy: Yes    Peripheral Vascular Disease:   Intermittent claudication:no  Pervious surgery or percutaneous intervention on the abdominal aorta or  the lower extremities: no  Abdominal or thoracic surgery: no  Arterial thrombosis: no  Venous thrombosis: no  Peripheral artery disease: no    Valvular disease:  Aortic Valve:  Aortic valve stenosis: No  Aortic Valve regurgitation: Yes Trace  Mechanical Valve:No  Tissue Valve: No  Mitral Valve:  Mitral Valve stenosis: No  Mitral valve regurgitation Yes Mild  Mechanical valve: No  Tissue Valve: No  Mitral Valve repair: No  Tricuspid Valve:  Tricuspid Valve Stenosis: No  Tricuspid valve regurgitation: Yes Mild  Mechanical Valve:No  Tissue Valve: No  Pulmonary Valve:  Pulmonary Valve stenosis:No  Pulmonary valve regurgitation: No  Mechanical Valve: No  Tissue Valve: No  ASD Repair: No   PFO Repair:No   History of DVT: no   History of Pulmonary Embolism: no  Subject has an ICD, CRT, Pacemaker or implantable loop recorder: Yes (Dual Chamber ICD, implanted 11 Jun 2014)  Other cardiovascular surgery or device intervention: Yes, Lead extraction with replacement, 11 Jun 2014    Neurological Medical History  History of carotid artery disease: No   Cartoid endarterectomy: No   Carotid Stent: No   Previous TIA: No   Previous Ischemic Stroke: No   Previous Hemorrhagic stroke: No   Previous implanted neurological devices: No   History of seizures: no  History of parkinson's: no  History of menieres disease: no    Medication History:  Warfarin  Is the subject warfarin naive: No  If No, per the medical records, what is the total length of time the subject has been on warfarin: greater than 5 years, subject has a history of therapy with warfarin dating back to before 2001  If No, was the subject on warfarin therapy within 30 days prior to enrollment in the study: Yes  If No, does the subject have a history of labile INRs (i.e. Therapeutic time in range <60%) No    Was subject on warfarin therapy and antiplatelet therapy (i.e. Aspirin, clopidogrel, prasugel, etc) simultaneously prior to consent? Yes, rationale: CAD post stent  "regimen    Current Anticoagulant and Antiplatelet medications:  Anticoagulant: Warfarin, start date 20 October 2017  Antiplatlet: 81 mg aspirin, start date 23 Aug 2018. Was previously on Plavix post PCI, stopped on 23 Aug 2018 at Dr. Reid instructions    Other anticoagulants:  Has subject taken other anticoagulants prior to consent and screening for this study? Yes, patient has a history of warfarin use and xarelto    NSAIDS  Was the subject on a regular non-steroidal anti-inflammatory drugs (NSAIDS) regimen prior to consent and screening for the study? No  Vitals:    08/29/18 1304   BP: 122/70   Patient Site: Right Arm   Patient Position: Sitting   Cuff Size: Adult Regular   Pulse: 72   Resp: 16   Weight: (!) 282 lb (127.9 kg)   Height: 6' 2\" (1.88 m)        CHADS2   Congestive heart failure 1 0   Hypertension 1 1   Age > 75 1 0   Diabetes mellitus 1 0   Stroke/TIA 2 0   CHADS2 Score 1     [id number]    Congestive heart failure/LV dysfunction 1 0   Hypertension 1 1   Diabetes mellitus 1 0   Stroke/TIA/TE 2 0   Vascular disease [prior MI, PAD or aortic plaque] 1 1   Age > 75 2 0   Age 65-74 1 1   Sex category [i.e. female gender] 1 0   CHADS2 -VASc Score 3       MODIFIED SHELDON SCALE   Score Description   0 No symptoms at all   1 No significant disability despite symptoms; able to carry out all usual duties and activities   2 Slight disability; unable to carry out all previous activities, but able to look after own affairs without assistance   3 Moderate disability; requiring some help, but able to walk without assistance   4 Moderately severe disability; unable to walk without assistance and unable to attend to own bodily needs without assistance   5 Severe disability; bedridden, incontinent and requiring constant nursing care and attention   6 Dead    Total score (0 - 6):  0    Was there was an increase in the patients total score of ? 1 since previous study required MRS test?   [] Yes  [] No [x] N/A (Baseline " visit)      If yes, [] Increase is due to a neurological event.    [] Increase is due to non-neurological event, specify:        NIH Stroke Scale    Interval: Baseline  Time: 1:46 PM  Person Administering Scale: Monique Giron    Administer stroke scale items in the order listed. Record performance in each category after each subscale exam. Do not go back and change scores. Follow directions provided for each exam technique. Scores should reflect what the patient does, not what the clinician thinks the patient can do. The clinician should record answers while administering the exam and work quickly. Except where indicated, the patient should not be coached (i.e., repeated requests to patient to make a special effort).      1a  Level of consciousness: 0=alert; keenly responsive   1b. LOC questions:  0=Performs both tasks correctly   1c. LOC commands: 0=Performs both tasks correctly   2.  Best Gaze: 0=normal   3.  Visual: 0=No visual loss   4. Facial Palsy: 0=Normal symmetric movement   5a.  Motor left arm: 0=No drift, limb holds 90 (or 45) degrees for full 10 seconds   5b.  Motor right arm: 0=No drift, limb holds 90 (or 45) degrees for full 10 seconds   6a. motor left le=No drift, limb holds 90 (or 45) degrees for full 10 seconds   6b  Motor right le=No drift, limb holds 90 (or 45) degrees for full 10 seconds   7. Limb Ataxia: 0=Absent   8.  Sensory: 0=Normal; no sensory loss   9. Best Language:  0=No aphasia, normal   10. Dysarthria: 0=Normal   11. Extinction and Inattention: 0=No abnormality   12. Distal motor function: 0=Normal    Total:   0     Baseline TTE: 18     Baseline KANDICE: 18      Brain Imaging (MRI/CT): NA, no prior history of TIA or stroke      Monique Giron

## 2021-06-26 NOTE — PROGRESS NOTES
Progress Notes by Monique Giron at 8/29/2018  1:00 PM     Author: Monique Giron Service: -- Author Type: Research Associate    Filed: 8/29/2018  2:38 PM Encounter Date: 8/29/2018 Status: Signed    : Monique Giron (Research Associate)           Study Description: The primary objective of this study is to establish the safety and effectiveness of the WATCHMAN FLX? Left Atrial Appendage Closure (LAAC) Device for subjects with non-valvular atrial fibrillation who are eligible for anticoagulation therapy to reduce the risk of stroke.    Buck Sinclair a 73 y.o. male , was seen in clinic today to discuss participation in the PINNACLE FLX study. The consent discussion began on 02 Jul 2018.  The consent form was reviewed with the patient and his spouse Neela.     The consent discussion included:    Study purpose     Conflict of interest    Device description      Study visits    Study medications    Risks of participation    Benefits (if any)    Alternatives    Voluntary participation    Confidentiality     Compensation/costs of participation    Injury and legal rights    The subject was provided time to review the consent form and consider participation. his questions were answered to his satisfaction. The patient has voluntarily agreed to participate in the above noted study. The consent form version 31 May 2018 and HIPPA form version 02 Jan 2018 was signed 08/29/18.     The subject was provided with a copy of the consent form and HIPPA. No study procedures were done prior to patient signing consent.    Plan: Baseline visit and TTE today. Baseline KANDICE and implant procedure scheduled for 08.30.2018     Monique Giron

## 2021-06-26 NOTE — TELEPHONE ENCOUNTER
ANTICOAGULATION  MANAGEMENT-Patient Home Monitoring Result    Assessment     Therapeutic INR result of 2.0 (goal INR of 2.0-3.0) received via fax from [employer] home INR monitoring company.        Previous INR was Therapeutic    Buck Sinclair last contacted by phone: 6/15/2021    Plan     Per home monitoring agreement with patient, patient was NOT contacted regarding therapeutic result today.  Patient is to continue current dose and continue to check INR with home monitor per protocol.  ?   Gisele Prince RN    Subjective/Objective:      Buck Sinclair, a 76 y.o. male  is established on warfarin using a home INR monitor.    Anticoagulation Episode Summary     Current INR goal:  2.0-3.0   TTR:  91.5 % (1 y)   Next INR check:  6/29/2021   INR from last check:  2.00 (6/22/2021)   Weekly max warfarin dose:     Target end date:     INR check location:     Preferred lab:     Send INR reminders to:  [facility]    Indications    Persistent Atrial Fibrillation [I48.91]           Comments:  home monitor talk to wife about dose [facility]   [phone]            Anticoagulation Care Providers     Provider Role Specialty Phone number    Erica Hansen CNP  Cardiology [phone]    Everett Renee MD  Cardiology [phone]

## 2021-06-26 NOTE — PROGRESS NOTES
Progress Notes by Everett Renee MD at 10/16/2018  3:40 PM     Author: Everett Renee MD Service: -- Author Type: Physician    Filed: 10/16/2018  3:40 PM Encounter Date: 10/16/2018 Status: Signed    : Everett Renee MD (Physician)       Sharlene Wick, RN  MD Dr. Thelma Epps,     This patient needs a prior authorization for Plavix.  Upon reviewing his chart I am wondering if he still needs to be on it.  Could you please also review his chart and let me know.   Pt with a hx of persistent afib, s/p PVI 2011 maintaining SR with Sotalol, CAD s/p stent placement 9-20-17 at which time he was started on Plavix and recommended to continue for at least 12 months.   Pt has since that time had a normal stress test on 7-19-18 with old apical abnormality as well as an aborted LAAO implant due to large size of appendage.   Pt was last seen in clinic with you on 7-6-18.   Cardiac medications include Lipitor 40 mg daily, Plavix 75 mg daily, Lasix 20 mg daily, Cozaar 50 mg daily and Warfarin.   Pt is compliant with Warfarin and has at least monthly checks with therapeutic INR levels.     Could you please let me know what you think?   Thank you,   Sharlene       If he can tolerate aspirin I would recommend stopping clopidogrel and starting aspirin 81 mg daily  If he is intolerant of aspirin, then I would recommend continuing clopidogrel 75 mg daily  Continue warfarin

## 2021-06-26 NOTE — PROGRESS NOTES
Progress Notes by Edna Joyce RN at 6/18/2018  8:30 AM     Author: Edna Joyce RN Service: -- Author Type: Registered Nurse    Filed: 6/18/2018 12:02 PM Encounter Date: 6/18/2018 Status: Signed    : Edna Joyce RN (Registered Nurse)       Return response rec'd 6-18-18 @ 0953:    Edna Joyce RN; Alan Vega LPN       No change  Ernestina Waldrop---Device check on 6/25 and if still in afib needs DCCV yet next week.   Erica Hansen, CNP

## 2021-06-26 NOTE — PROGRESS NOTES
Progress Notes by Monique Giron at 10/29/2018 10:05 AM     Author: Monique Giron Service: -- Author Type: Research Associate    Filed: 10/29/2018 10:14 AM Encounter Date: 10/29/2018 Status: Signed    : Monique Giron (Research Associate)           Study Description: The primary objective of this study is to establish the safety and effectiveness of the WATCHMAN FLX? Left Atrial Appendage Closure (LAAC) Device for subjects with non-valvular atrial fibrillation who are eligible for anticoagulation therapy to reduce the risk of stroke.    Buck Sinclair was seen contacted for his 45 day intent to treat follow up visit. Buck was scheduled for LAAO implant with the WATCHMAN FLX device on 30 Aug 2018. Unfortunately his procedure was aborted due to left atrial remodeling and his appendage size was too large to safely place a device.    Study Medication update:  Patient is currently on 81 mg aspirin daily and warfarin daily.    Adverse Events/Updates:  No Adverse event updates.  Patient had hemoptysis on 25 Oct 2018 prompting an ER visit. This should not meet AE reporting guidelines since subjects was on Warfarin and Aspirin prior to consent in this study. This will be confirmed with study sponsor.    Plan: At this time Buck has no further follow up in the PINNACLE FLX Study, his enrollment is completed 10/29/18    Monique Giron

## 2021-06-26 NOTE — PROGRESS NOTES
Progress Notes by Monique Martinez at 10/29/2018 10:05 AM     Author: Monique Martinez Service: -- Author Type: Research Associate    Filed: 11/16/2018  2:28 PM Encounter Date: 10/29/2018 Status: Signed    : Deejay Lizama MD (Physician)    Related Notes: Original Note by Monique Martinez (Research Associate) filed at 10/30/2018  9:46 AM           Study Description: The primary objective of this study is to establish the safety and effectiveness of the WATCHMAN FLX? Left Atrial Appendage Closure (LAAC) Device for subjects with non-valvular atrial fibrillation who are eligible for anticoagulation therapy to reduce the risk of stroke.    Adverse Event Term: hemoptysis  Adverse Event Number: 001  Event Onset Date: 25 Oct 2018  Date Event Resolved: 25 Oct 2018  Date Site Personnel became aware: 29 Oct 2018  Date Sponsor was notified: 30 Oct 2018    Detailed Description of Event: Subject seen in ED with hemoptysis, at time of event subject is on 81 mg aspirin and warfarin. Subjects INR and hemoglobin were stable at time of event. Per pulmonary care physician Hemoptysis present  In setting of bronchitis with LLL bronchiectatic area that is prone to bleeding. Patient started on doxycycline for 10 days and to continue his regular COPD treatment    Suspected Cause: Bronchitis    Event Relationship:   Related to a worsening pre-existing condition Yes   Related to cardiovascular condition No   Related to medication Possibly   Related to study procedure: Not related   Related to non-study procedure No   Related to study device Not related   Related to non-study devices or non-study accessories (commercially available devices/accessories) No     Outcome: Recovering/resovling    Adverse Event Category: Bleeding, type 2     Is an adverse event considered serious? No    Diagnostic Tests: Yes  Chest Xray 25 Oct 2018     How many KANDICE(s) was/were performed? 0    Corrective Actions: Yes   25 Oct 2018, 10 days of doxycycline

## 2021-06-26 NOTE — PROGRESS NOTES
Progress Notes by Leana Walton RN at 9/4/2018 11:02 AM     Author: Leana Walton RN Service: -- Author Type: Registered Nurse    Filed: 9/4/2018 12:11 PM Encounter Date: 9/4/2018 Status: Signed    : Leana Walton RN (Registered Nurse)       Fay Yousif, FENG [employer]     Just a heads up. This patient's LAAO implant was aborted. His LA appendage was too large for the device.

## 2021-06-28 NOTE — PROGRESS NOTES
"Progress Notes by Everett Renee MD at 4/8/2020  8:50 AM     Author: Everett Renee MD Service: -- Author Type: Physician    Filed: 4/8/2020 12:56 PM Encounter Date: 4/8/2020 Status: Signed    : Everett Renee MD (Physician)           The patient has been notified of following:     \"This telephone visit will be conducted via a call between you and your physician/provider. We have found that certain health care needs can be provided without the need for a physical exam.  This service lets us provide the care you need with a phone conversation.  If a prescription is necessary we can send it directly to your pharmacy.  If lab work is needed we can place an order for that and you can then stop by our lab to have the test done at a later time. If during the course of the call the physician/provider feels a telephone visit is not appropriate, you will not be charged for this service.\" Verbal consent has been obtained for this service by care team member:         HEART CARE PHONE ENCOUNTER        The patient has chosen to have the visit conducted as a telephone visit, to reduce risk of exposure given the current status of Coronavirus in our community. This telephone visit is being conducted via a call between the patient and physician/provider. Health care needs are being provided without a physical exam.     Assessment/Recommendations   Assessment:    1. [facility] Heart Care Note  Assessment:  Atrial atrial fibrillation dating back to 1997 with multiple external cardioversions        Atrial fibrillation is very symptomatic with generalized weakness fatigue but not so much palpitations  Chronic amiodarone was partially effective;Continued for many years ; stopped because of photosensitive and other adverse effects   Failed Tikosyn  Sotalol   Pulmonary vein isolation done on 2 occasions 2011   Post PVI Cardoversion February 12/ 2015 ; CV 10-         Cardioversion  11-        Atrial " fibrillation April 25, 2017        CV 5-; relapse < 36 hour  Long-standing advanced dilated cardiomyopathy dating back to 1997       Recent stress nuclear scan; April 30 2014 showed ejection fraction 47% without ischemia        Echocardiogram 21 September 2015 showed ejection fraction equals 50%       Stress nuclear scan 11- EF=63%       Transesophageal echocardiogram December 4, 2017; ejection fraction 50% sinus node dysfunction   MedtronicPacemaker implanted May 1999-dual atrial leads;         generator replacement May 2005          Removal of atrial and ventricular pacing leads with placement of ICD system 6 1 2014 and Medtronic single coil   Chronic anticoagulation with warfarin ; he had excessive skin bleeding from Eliquis   Obesity-substantial weight reduction on conscientious diet   COPD felt be related to previous  exposure   Hypertension  Hyperlipidemia well controlled on statin;   Coronary artery disease with stenting to proximal LAD September 20, 2017  Right hand swelling may be related to the subclavian venous  obstruction  Plan:  1. HR rather low at times; device programmed to pace at 50 BPM; DDD  Will reprogram to DDDR  to allow higher  Heart rates  Watch O2 sats closely; if < 90% would consider home oxygen-is to follow-up with his pulmonary specialist  Laboratory studies 2- were normal for electrolytes and renal  INR well controlled; warfarin therapy  Continue current medication   Follow up in 3 months       Follow Up Plan: Follow up in   I have reviewed the note as documented.  This accurately captures the substance of my conversation with the patient.    Total time of call between patient and provider was24 minutes   Start Time:910  Stop Time:924  Additional 20 minutes to review medical records and generate report       History of Present Illness/Subjective    Buck Sinclair is a 75 y.o. male who is being evaluated via a billable telephone visit.    Herbie  notes shortness of breath on exertion  Resting his breathing is satisfactory and his saturations are generally 92-94%  He tells me his oxygen saturations drop when he is breathless with exertion perhaps less than 80%  Has had discussions with his pulmonary specialist and have had discussions with home oxygen  Over the winter he is not been very energetic just not doing much activity; indeed, looking at his pacemaker interrogation shows quite a bit less activity compared to last year  Denies orthopnea, no pedal edema  Does used to inhalers  His stress nuclear scan November 7, 2019 showed ejection fraction of 63% with some fixed apical changes-that is his best ejection fraction in quite some time  He is on warfarin his INR is been controlled  Blood work February 4, 2020 showed creatinine 1.26  Cholesterol 133 with LDL 66  Device interrogation  Battery good for another 2.1 years  Very little ventricular pacing  About 25% atrial pacing even with DDD equals 50  Only a small amount of atrial fibrillation, 0.2% burden he did have a 2-hour episode   I thought his breathlessness was mainly a pulmonary issue and if indeed his saturations dropped less than 90% he should be on home oxygen  Advised him to follow-up with his pulmonary specialist  Also to monitor his oxygen saturations closely and document any oxygen saturations less than 90%  Continue current medications including warfarin and high-dose sotalol    I have reviewed and updated the patient's Past Medical History, Social History, Family History and Medication List.     Physical Examination not performed given phone encounter Review of Systems                                                Medical History  Surgical History Family History Social History   Past Medical History:   Diagnosis Date   ? Anemia    ? BPH (benign prostatic hyperplasia)    ? COPD, group B, by GOLD 2017 classification (H)    ? Coronary artery disease due to calcified coronary lesion    ?  Dyslipidemia, goal LDL below 70    ? Essential hypertension    ? History of cardiomyopathy    ? History of transfusion    ? Persistent atrial fibrillation    ? Pneumonia of left lower lobe due to infectious organism (H) 10/4/2017   ? Skin cancer of trunk    ? Status post catheter ablation of atrial fibrillation 6/7/2017    PVI 4-2011 (Cryo/PVI + roof line + CTI line) Re-do PVI 7-2011 (RFA/PVI + CFE + VIDYA + confirmed CTI line)   ? Ventricular tachycardia (H)     Past Surgical History:   Procedure Laterality Date   ? CARDIAC DEFIBRILLATOR PLACEMENT     ? CARDIOVERSION  07/11/2018    x20, last 2/12/15, 10/2015, 11/18/16, 6/16/17 by Lauren Foster CNP   ? CARDIOVERSION  07/11/2018   ? COLONOSCOPY N/A 4/28/2017    Procedure: COLONOSCOPY with 2 ascending polyps and 1 transverse polyp;  Surgeon: Jose Whittington MD;  Location: [facility] GI;  Service:    ? CV CORONARY ANGIOGRAM N/A 9/20/2017    Procedure: Coronary Angiogram;  Surgeon: Sergio Cervantes MD;  Location: [facility] Cath Lab;  Service:    ? EP ICD INSERT     ? FRACTURE SURGERY Left     wrist   ? INGUINAL HERNIA REPAIR Left 1967    while in the [employer] in [place] after 13 month in [place]   ? INSERT / REPLACE / REMOVE PACEMAKER     ? left hand surgery---tendon repair     ? NH ABLATE HEART DYSRHYTHM FOCUS  04/2011    Catheter Ablation Atrial Fibrillation PVI Apr 2011 (Cryo+RF-PVI + roof line + CTI line)   ? NH ABLATE HEART DYSRHYTHM FOCUS  07/2011    Re-do PVI Jul 2011 (RFA-PVI + CFE + VIDYA + confirmation of CTI line)   ? NH MYERS W/O FACETEC FORAMOT/DSKC 1/2 VRT SEG, CERVICAL      Laminectomy Lumbar;  Recorded: 03/09/2012;   ? TOTAL SHOULDER REPLACEMENT Right 03/03/2016    Dr. Abernathy of [facility]    Family History   Problem Relation Age of Onset   ? Cancer Mother         leukemia   ? Cancer Father         bladder   ? Cancer Sister         breast with lung met.   ? Aneurysm Sister    ? CABG Brother    ? CABG Brother    ? Valvular heart disease Brother          valve replacement    Social History     Socioeconomic History   ? Marital status:      Spouse name: Neela   ? Number of children: Not on file   ? Years of education: 12   ? Highest education level: Not on file   Occupational History   ? Occupation:      Employer: RETIRED   ? Occupation:  police     Comment: [place]   Social Needs   ? Financial resource strain: Not on file   ? Food insecurity     Worry: Not on file     Inability: Not on file   ? Transportation needs     Medical: Not on file     Non-medical: Not on file   Tobacco Use   ? Smoking status: Former Smoker     Packs/day: 1.00     Years: 4.00     Pack years: 4.00     Types: Cigarettes     Last attempt to quit: 1968     Years since quittin.3   ? Smokeless tobacco: Never Used   Substance and Sexual Activity   ? Alcohol use: Yes     Alcohol/week: 2.0 standard drinks     Types: 2 Cans of beer per week     Comment: 1 beer per week   ? Drug use: No   ? Sexual activity: Yes     Partners: Female     Birth control/protection: Post-menopausal   Lifestyle   ? Physical activity     Days per week: Not on file     Minutes per session: Not on file   ? Stress: Not on file   Relationships   ? Social connections     Talks on phone: Not on file     Gets together: Not on file     Attends [employer] service: Not on file     Active member of club or organization: Not on file     Attends meetings of clubs or organizations: Not on file     Relationship status: Not on file   ? Intimate partner violence     Fear of current or ex partner: Not on file     Emotionally abused: Not on file     Physically abused: Not on file     Forced sexual activity: Not on file   Other Topics Concern   ? Not on file   Social History Narrative   ? Not on file          Medications  Allergies   Current Outpatient Medications   Medication Sig Dispense Refill   ? ACETAMINOPHEN (TYLENOL ORAL) Take 1,000 mg by mouth 2 (two) times a day.      ? albuterol (PROAIR  HFA;PROVENTIL HFA;VENTOLIN HFA) 90 mcg/actuation inhaler Inhale 2 puffs every 6 (six) hours as needed for wheezing. 1 Inhaler 11   ? amoxicillin (AMOXIL) 500 MG tablet Take prior to the Dentist     ? ascorbic acid (VITAMIN C) 1000 MG tablet Take 1,000 mg by mouth daily.     ? atorvastatin (LIPITOR) 40 MG tablet Take 40 mg by mouth at bedtime.     ? budesonide-formoterol (SYMBICORT) 160-4.5 mcg/actuation inhaler Inhale 2 puffs 2 (two) times a day.     ? co-enzyme Q-10 30 mg capsule Take 100 mg by mouth daily.     ? furosemide (LASIX) 20 MG tablet TAKE 1 TABLET(20 MG) BY MOUTH DAILY 90 tablet 3   ? losartan (COZAAR) 50 MG tablet TAKE 1 TABLET BY MOUTH EVERY DAY 90 tablet 2   ? MAG 64 64 mg TbEC delayed-release tablet TAKE 1 TABLET BY MOUTH TWICE DAILY 180 tablet 1   ? multivitamin (MULTIVITAMIN) per tablet Take 1 tablet by mouth daily.     ? omega 3-dha-epa-fish oil (FISH OIL) 1,000 mg (120 mg-180 mg) cap Take 2 tablets by mouth 2 (two) times a day.      ? polyethylene glycol (MIRALAX) 17 gram packet Take 17 g by mouth daily.     ? sotaloL (BETAPACE) 80 MG tablet TAKE 2 TABLETS(160 MG) BY MOUTH TWICE DAILY. 360 tablet 5   ? tiotropium (SPIRIVA) 18 mcg inhalation capsule Place 18 mcg into inhaler and inhale daily.     ? vardenafil (LEVITRA) 10 MG tablet Take 10 mg by mouth daily as needed for erectile dysfunction.     ? VITAMIN D2 50,000 unit capsule Take 50,000 Units by mouth 2 (two) times a week. SUN and WED  3   ? warfarin ANTICOAGULANT (COUMADIN/JANTOVEN) 2.5 MG tablet Take 1-2 tablets (2.5-5 mg total) by mouth daily. Adjust dose per INR results as instructed. 130 tablet 1   ? sotaloL (BETAPACE) 80 MG tablet TAKE 2 TABLETS(160 MG) BY MOUTH TWICE DAILY. 360 tablet 0   ? sotaloL (BETAPACE) 80 MG tablet TAKE 2 TABLETS(160 MG) BY MOUTH TWICE DAILY. 360 tablet 0     No current facility-administered medications for this visit.     Allergies   Allergen Reactions   ? Adhesive Other (See Comments)     ADHESIVE TAPE; SKIN  IRRITATION  Foam tape:  Skin removed with tape removal   ? Amiodarone      ADVERSE REACTION.  Sunlight sensitivity.   ? Lisinopril Cough         Lab Results    Chemistry/lipid CBC Cardiac Enzymes/BNP/TSH/INR   Lab Results   Component Value Date    CHOL 133 02/04/2020    HDL 52 02/04/2020    LDLCALC 66 02/04/2020    TRIG 75 02/04/2020    CREATININE 1.26 02/04/2020    BUN 26 02/04/2020    K 4.5 02/04/2020     02/04/2020     (H) 02/04/2020    CO2 23 02/04/2020    Lab Results   Component Value Date    WBC 4.8 10/25/2018    HGB 12.1 (L) 10/25/2018    HCT 37.2 (L) 10/25/2018    MCV 98 10/25/2018     10/25/2018    Lab Results   Component Value Date    TROPONINI 0.01 10/04/2017     (H) 10/04/2017    TSH 1.36 07/06/2018    INR 2.80 (!) 04/07/2020        Everett Renee

## 2021-06-28 NOTE — PROGRESS NOTES
Progress Notes by Everett Renee MD at 2/24/2020  9:49 AM     Author: Everett Renee MD Service: -- Author Type: Physician    Filed: 2/24/2020  9:53 AM Encounter Date: 2/24/2020 Status: Signed    : Everett Renee MD (Physician)       Name from pharmacy: SOTALOL 80MG TABLETS          Will file in chart as: sotaloL (BETAPACE) 80 MG tablet         Sig: TAKE 2 TABLETS(160 MG) BY MOUTH TWICE DAILY.    Disp:  360 tablet    Refills:  0 (Pharmacy requested: Not specified)    Start: 2/19/2020    Class: Normal    For: Persistent atrial fibrillation; ICD (implantable cardioverter-defibrillator), dual, in situ    Last ordered: 3 months ago by Everett Renee MD     Rx #: [id number]       To be filled at: [id number] [place] [address] AT [facility] [address]         Medication Refill     David Olea RN routed conversation to You 3 days ago      David Olea RN 3 days ago         RN cannot approve Refill Request     RN can NOT refill this medication med is not covered by policy/route to provider. Last office visit: Visit date not found Last Physical: Visit date not found Last MTM visit: Visit date not found Last visit same specialty: 11/30/2017 Jenny Peter PA-C.  Next visit within 3 mo: Visit date not found  Next physical within 3 mo: Visit date not found        Carol Ann Araujo Connection Triage/Med Refill 2/21/2020        Plan  Will ask RN to refill  Renew sotalol 160 mg two times a day  Although high dose sotalol; he does have ICD and bradycardia not a concern

## 2021-06-28 NOTE — PROGRESS NOTES
Progress Notes by Everett Renee MD at 4/8/2020  9:39 AM     Author: Everett Renee MD Service: -- Author Type: Physician    Filed: 4/8/2020  5:40 PM Encounter Date: 4/8/2020 Status: Signed    : Everett Renee MD (Physician)       Flakita Mcrae RN Granrud, Gregory A, MD               Hi Dr. Renee,   When would you like this programming done? We are seeing a few patients in the clinic, or should we wait 2-3 months?     Thanks,   Flakita      I think we should do it pretty soon if he is willing to come to clinic

## 2021-06-29 ENCOUNTER — AMBULATORY - HEALTHEAST (OUTPATIENT)
Dept: ANTICOAGULATION | Facility: CLINIC | Age: 76
End: 2021-06-29

## 2021-06-29 ENCOUNTER — TRANSFERRED RECORDS (OUTPATIENT)
Dept: HEALTH INFORMATION MANAGEMENT | Facility: CLINIC | Age: 76
End: 2021-06-29

## 2021-06-29 DIAGNOSIS — I48.91 ATRIAL FIBRILLATION (H): ICD-10-CM

## 2021-06-29 LAB — INR PPP: 2 (ref 0.9–1.1)

## 2021-06-29 NOTE — PROGRESS NOTES
Progress Notes by Leana Walton RN at 10/5/2020  2:53 PM     Author: Leana Walton RN Service: -- Author Type: Registered Nurse    Filed: 10/5/2020  2:55 PM Encounter Date: 10/5/2020 Status: Signed    : Leana Walton RN (Registered Nurse)     Summary: [facility] FLX device updates      Deejay Lizama MD Hiemstra, Carrie E, FENG Dueñas   I misunderstood you on Friday.   I thought he was considered 2.5.   We should not consider him for FLX now.   I would keep on the list to review when Amulet comes out.   Thanks   S    Previous Messages    ----- Message -----   From: Leana Walton RN   Sent: 10/2/2020  12:53 PM CDT   To: Deejay Lizama MD   Subject: Possible Flx                                     Dr Lizama,     Pt was part of FLX trial. His appendage was determined to be to large for the largest device.   Should we review his case further?     Thank you, Leana

## 2021-06-29 NOTE — PROGRESS NOTES
Progress Notes by Gisele Prince RN at 11/4/2020  9:19 AM     Author: Gisele Prince RN Service: -- Author Type: Registered Nurse    Filed: 11/4/2020  9:21 AM Encounter Date: 11/4/2020 Status: Attested    : Gisele Prince RN (Registered Nurse) Cosigner: Erica Hansen CNP at 11/4/2020  4:10 PM    Attestation signed by Erica Hansen CNP at 11/4/2020  4:10 PM    Agree                Anticoagulation Annual Referral Renewal Review    Buck Sinclair's chart reviewed for annual renewal of referral to anticoagulation monitoring.        Criteria for anticoagulation nurse and/or pharmacist renewal met   Warfarin indication: Atrial Fibrillation Yes, per indication   Current with INR monitoring/compliant Yes  Patient has a home monitor Yes   Date of last office visit 11/2/2020 Yes, had office visit within last year   Time in Therapeutic Range (TTR) 96.4 % Yes, TTR > 60%       Buck Sinclair met all criteria for anticoagulation management program initiated renewal.  New INR standing orders and anticoagulation referral renewal placed.      Gisele Prince RN  9:19 AM

## 2021-06-30 NOTE — PROGRESS NOTES
Progress Notes by Leana Walton RN at 10/5/2020  2:53 PM     Author: Leana Walton RN Service: -- Author Type: Registered Nurse    Filed: 11/30/2020  2:22 PM Encounter Date: 10/5/2020 Status: Signed    : Leana Walton RN (Registered Nurse)       Deejay Lizama MD Hiemstra, Carrie E, FENG Dueñas,  I reviewed Salvador's previous studies.  If he is still interested in potentially pursuing this then we would need to do a CT scan to reassess his left atrial appendage size.  Thanks  Deejay    Previous Messages    ----- Message -----   From: Leana Walton RN   Sent: 10/5/2020   9:23 AM CST   To: Deejay Lizama MD   Subject: RE: Possible Flx                                 Dr Lizama,     I will update the list and keep him on for possible Amulet.     Thank you, Leana

## 2021-07-03 NOTE — ADDENDUM NOTE
Addendum Note by Val Angeles RN at 12/2/2020  9:21 AM     Author: Val Angeles RN Service: -- Author Type: Registered Nurse    Filed: 12/2/2020 10:19 AM Encounter Date: 12/2/2020 Status: Signed    : Val Angeles RN (Registered Nurse)    Addended by: VAL ANGELES on: 12/2/2020 10:19 AM        Modules accepted: Orders

## 2021-07-03 NOTE — ADDENDUM NOTE
Addendum Note by Kaila Dupont MD at 7/11/2018  1:50 PM     Author: Kaila Dupont MD Service: -- Author Type: Physician    Filed: 7/11/2018  1:50 PM Date of Service: 7/11/2018  1:50 PM Status: Signed    : Kaila Dupont MD (Physician)       Addendum  created 07/11/18 1350 by Kaila Dupont MD    Sign clinical note

## 2021-07-03 NOTE — ADDENDUM NOTE
Addendum Note by Erica Urias CNP at 6/13/2018 10:29 AM     Author: Erica Urias CNP Service: -- Author Type: Nurse Practitioner    Filed: 6/13/2018 10:29 AM Encounter Date: 6/12/2018 Status: Signed    : Ercia Urias CNP (Nurse Practitioner)    Addended by: ERICA URIAS on: 6/13/2018 10:29 AM        Modules accepted: Orders, SmartSet

## 2021-07-04 NOTE — ADDENDUM NOTE
Addendum Note by Hugh Ji MD at 5/10/2021 10:00 AM     Author: Hugh Ji MD Service: -- Author Type: Physician    Filed: 5/10/2021 10:32 AM Encounter Date: 5/10/2021 Status: Signed    : Hugh Ji MD (Physician)    Addended by: HUGH JI on: 5/10/2021 10:32 AM        Modules accepted: Orders

## 2021-07-04 NOTE — TELEPHONE ENCOUNTER
Telephone Encounter by Gisele Prince RN at 6/8/2021 12:21 PM     Author: Gisele Prince RN Service: -- Author Type: Registered Nurse    Filed: 6/8/2021 12:27 PM Encounter Date: 6/8/2021 Status: Addendum    : Gisele Prince RN (Registered Nurse)    Related Notes: Original Note by Gisele Prince RN (Registered Nurse) filed at 6/8/2021 12:26 PM       ANTICOAGULATION  MANAGEMENT    Assessment     Today's INR result of 1.8 is Subtherapeutic (goal INR of 2.0-3.0)        Warfarin taken as previously instructed    Increased greens/vitamin K intake may be affecting INR- had a large portion of villarreal salad last night.    No new medication/supplements affecting INR    Continues to tolerate warfarin with no reported s/s of bleeding or thromboembolism     Previous INR was Therapeutic    Plan:     Spoke on phone with patient's spouse Neela regarding INR result and instructed:      Warfarin Dosing Instructions:  Continue current warfarin dose    5 mg every Mon; 2.5 mg all other days       (0 % change)    Instructed patient to follow up no later than: one week with home monitor  Neela stated that that they will be at their cabin in the next 2 weeks and  is requesting to call her at      Education provided: impact of vitamin K foods on INR, importance of therapeutic range, target INR goal and significance of current INR result and importance of following up for INR monitoring at instructed interval    Neela verbalizes understanding and agrees to warfarin dosing plan.    Instructed to call the [facility] for any changes, questions or concerns. (#[phone])   ?   Gisele Prince RN    Subjective/Objective:      Buck Sinclair, a 76 y.o. male is on warfarin. Buck Jane reports:     Home warfarin dose: verbally confirmed home dose with Neela and updated on anticoagulation calendar     Missed doses: No     Medication changes:  No     S/S of bleeding or thromboembolism:  No     New Injury  or illness:  No     Changes in diet or alcohol consumption:  Yes: see above.     Upcoming surgery, procedure or cardioversion:  No    Anticoagulation Episode Summary     Current INR goal:  2.0-3.0   TTR:  92.5 % (1 y)   Next INR check:  6/15/2021   INR from last check:  1.80 (6/8/2021)   Weekly max warfarin dose:     Target end date:     INR check location:     Preferred lab:     Send INR reminders to:  [facility]    Indications    Persistent Atrial Fibrillation [I48.91]           Comments:  home monitor talk to wife about dose [facility]   [phone]            Anticoagulation Care Providers     Provider Role Specialty Phone number    Erica Hansen CNP  Cardiology [phone]    Everett Renee MD  Cardiology [phone]

## 2021-07-06 ENCOUNTER — TRANSFERRED RECORDS (OUTPATIENT)
Dept: HEALTH INFORMATION MANAGEMENT | Facility: CLINIC | Age: 76
End: 2021-07-06

## 2021-07-06 ENCOUNTER — AMBULATORY - HEALTHEAST (OUTPATIENT)
Dept: ANTICOAGULATION | Facility: CLINIC | Age: 76
End: 2021-07-06

## 2021-07-06 LAB — INR PPP: 2 (ref 0.9–1.1)

## 2021-07-07 NOTE — PROGRESS NOTES
ANTICOAGULATION  MANAGEMENT-Patient Home Monitoring Result    Assessment     Therapeutic INR result of 2.0 (goal INR of 2.0-3.0) received via fax from [employer] home INR monitoring company.        Previous INR was Therapeutic    Buck Sinclair last contacted by phone: 6/15/21    Plan     Per home monitoring agreement with patient, patient was NOT contacted regarding therapeutic result today.  Patient is to continue current dose and continue to check INR with home monitor per protocol.  ?   Judy Hilliard RN    Subjective/Objective:      Buck Sinclair, a 76 y.o. male  is established on warfarin using a home INR monitor.    Anticoagulation Episode Summary     Current INR goal:  2.0-3.0   TTR:  91.4 % (11.9 mo)   Next INR check:  6/29/2021   INR from last check:  2.00 (6/22/2021)   Weekly max warfarin dose:     Target end date:     INR check location:     Preferred lab:     Send INR reminders to:  [facility]    Indications    Persistent Atrial Fibrillation [I48.91]           Comments:  home monitor talk to wife about dose [facility]   [phone]            Anticoagulation Care Providers     Provider Role Specialty Phone number    Erica Hansen CNP  Cardiology [phone]    Everett Renee MD  Cardiology [phone]

## 2021-07-08 ENCOUNTER — COMMUNICATION - HEALTHEAST (OUTPATIENT)
Dept: CARDIOLOGY | Facility: CLINIC | Age: 76
End: 2021-07-08

## 2021-07-08 DIAGNOSIS — Z86.79 HISTORY OF SICK SINUS SYNDROME: ICD-10-CM

## 2021-07-08 DIAGNOSIS — I42.9 IDIOPATHIC CARDIOMYOPATHY (H): ICD-10-CM

## 2021-07-08 DIAGNOSIS — I48.19 PERSISTENT ATRIAL FIBRILLATION (H): ICD-10-CM

## 2021-07-08 DIAGNOSIS — I42.9 CARDIOMYOPATHY, IDIOPATHIC (H): ICD-10-CM

## 2021-07-09 ENCOUNTER — AMBULATORY - HEALTHEAST (OUTPATIENT)
Dept: MULTI SPECIALTY CLINIC | Facility: CLINIC | Age: 76
End: 2021-07-09

## 2021-07-09 ENCOUNTER — COMMUNICATION - HEALTHEAST (OUTPATIENT)
Dept: CARDIOLOGY | Facility: CLINIC | Age: 76
End: 2021-07-09

## 2021-07-09 DIAGNOSIS — J44.9 COPD, GROUP B, BY GOLD 2017 CLASSIFICATION (H): ICD-10-CM

## 2021-07-09 DIAGNOSIS — I48.91 A-FIB (H): ICD-10-CM

## 2021-07-09 NOTE — TELEPHONE ENCOUNTER
Telephone Encounter by Gisele Prince RN at 7/9/2021 12:31 PM     Author: Gisele Prince RN Service: -- Author Type: Registered Nurse    Filed: 7/9/2021 12:35 PM Encounter Date: 7/8/2021 Status: Signed    : Gisele Prince RN (Registered Nurse)       Lab Results   Component Value Date    INR 2.00 (!) 07/06/2021    INR 2.00 (!) 06/29/2021    INR 2.00 (!) 06/22/2021       Patient's current Warfarin doses:     5 mg every Mon; 2.5 mg all other days           Next INR check is on 7/13/2021      Patient's last OV with HCC was on 5/11/2021    Warfarin prescription 3 month supply and one refill sent to patient's pharmacy today.    Gisele Prince RN

## 2021-07-13 ENCOUNTER — ANTICOAGULATION THERAPY VISIT (OUTPATIENT)
Dept: CARDIOLOGY | Facility: CLINIC | Age: 76
End: 2021-07-13

## 2021-07-13 ENCOUNTER — TRANSFERRED RECORDS (OUTPATIENT)
Dept: HEALTH INFORMATION MANAGEMENT | Facility: CLINIC | Age: 76
End: 2021-07-13

## 2021-07-13 DIAGNOSIS — I48.91 ATRIAL FIBRILLATION (H): Primary | ICD-10-CM

## 2021-07-13 LAB — INR PPP: 1.9

## 2021-07-13 NOTE — PROGRESS NOTES
ANTICOAGULATION MANAGEMENT     Buck Sinclair 76 year old male is on warfarin with subtherapeutic INR result. (Goal INR 2.0-3.0)    Recent labs: (last 7 days)     07/13/21  0000   INR 1.9       ASSESSMENT     Source(s): Patient/Caregiver Call       Warfarin doses taken: Warfarin taken as instructed    Diet: Increased greens/vitamin K in diet; ongoing change    New illness, injury, or hospitalization: No    Medication/supplement changes: None noted    Signs or symptoms of bleeding or clotting: No    Previous INR: Therapeutic last 2(+) visits    Additional findings: noted that INR is trending down- will plan to adjust doses at this time  Neela reported that the patient has 2.5 mg tablets on hand but prefers not to split tablets for dosing.     PLAN     Recommended plan for ongoing change(s) affecting INR     Dosing Instructions:  Increase your warfarin dose (12.5% change) with next INR in 1 week       Summary  As of 7/13/2021    Full warfarin instructions:  5 mg every Mon, Thu; 2.5 mg all other days   Next INR check:  7/20/2021             Telephone call with  patient's spouse Neela who verbalizes understanding and agrees to plan    Patient to recheck with home meter - Neela is requesting to contact her at [phone] next week.    Education provided: Importance of consistent vitamin K intake, Target INR goal and significance of current INR result, Importance of therapeutic range, Importance of following up for INR monitoring at instructed interval and Importance of taking warfarin as instructed    Plan made per [facility] anticoagulation protocol    Gisele Prince RN  Anticoagulation Clinic  7/13/2021    _______________________________________________________________________     Anticoagulation Episode Summary     Current INR goal:  2.0-3.0   TTR:  89.6 % (1 y)   Target end date:     Send INR reminders to:  [facility]    Indications    Persistent Atrial Fibrillation [I48.91]           Comments:   home monitor talk to wife about dose Neela   [phone]         Anticoagulation Care Providers     Provider Role Specialty Phone number    Erica Hansen APRN [facility]  Family Medicine [phone]    Everett Renee MD  Cardiovascular Disease [phone]

## 2021-07-14 PROBLEM — J44.9 COPD, GROUP D, BY GOLD 2017 CLASSIFICATION (H): Chronic | Status: RESOLVED | Noted: 2017-10-05 | Resolved: 2019-04-11

## 2021-07-20 ENCOUNTER — ANTICOAGULATION THERAPY VISIT (OUTPATIENT)
Dept: ANTICOAGULATION | Facility: CLINIC | Age: 76
End: 2021-07-20

## 2021-07-20 ENCOUNTER — TRANSFERRED RECORDS (OUTPATIENT)
Dept: HEALTH INFORMATION MANAGEMENT | Facility: CLINIC | Age: 76
End: 2021-07-20

## 2021-07-20 DIAGNOSIS — I48.91 ATRIAL FIBRILLATION (H): Primary | ICD-10-CM

## 2021-07-20 LAB — INR PPP: 2.1

## 2021-07-20 NOTE — PROGRESS NOTES
ANTICOAGULATION MANAGEMENT     Buck Sinclair 76 year old male is on warfarin with therapeutic INR result. (Goal INR 2.0-3.0)    Recent labs: (last 7 days)     07/20/21  0000   INR 2.1       ASSESSMENT     Source(s): Patient/Caregiver Call       Warfarin doses taken: Warfarin taken as instructed    Diet: No new diet changes identified    New illness, injury, or hospitalization: No    Medication/supplement changes: None noted    Signs or symptoms of bleeding or clotting: No    Previous INR: Subtherapeutic    Additional findings: None     PLAN     Recommended plan for no diet, medication or health factor changes affecting INR     Dosing Instructions: Continue your current warfarin dose with next INR in 2 weeks       Summary  As of 7/20/2021    Full warfarin instructions:  5 mg every Mon, Thu; 2.5 mg all other days   Next INR check:  8/3/2021             Telephone call with  patient's spouse neela who verbalizes understanding and agrees to plan    Patient to recheck with home meter    Education provided: Target INR goal and significance of current INR result and Importance of therapeutic range    Plan made per [facility] anticoagulation protocol    Gisele Prince, RN  Anticoagulation Clinic  7/20/2021    _______________________________________________________________________     Anticoagulation Episode Summary     Current INR goal:  2.0-3.0   TTR:  88.7 % (1 y)   Target end date:     Send INR reminders to:  [facility]    Indications    Persistent Atrial Fibrillation [I48.91]           Comments:  home monitor talk to wife about dose Neela   [phone]         Anticoagulation Care Providers     Provider Role Specialty Phone number    Erica Hansen APRN [facility]  Family Medicine [phone]    Everett Renee MD  Cardiovascular Disease [phone]

## 2021-07-27 ENCOUNTER — TRANSFERRED RECORDS (OUTPATIENT)
Dept: HEALTH INFORMATION MANAGEMENT | Facility: CLINIC | Age: 76
End: 2021-07-27

## 2021-07-27 ENCOUNTER — ANTICOAGULATION THERAPY VISIT (OUTPATIENT)
Dept: ANTICOAGULATION | Facility: CLINIC | Age: 76
End: 2021-07-27

## 2021-07-27 DIAGNOSIS — I48.91 ATRIAL FIBRILLATION (H): Primary | ICD-10-CM

## 2021-07-27 LAB — INR (EXTERNAL): 2.2 (ref 0.9–1.1)

## 2021-07-27 NOTE — PROGRESS NOTES
ANTICOAGULATION  MANAGEMENT-Patient Home Monitoring Result    Assessment     Therapeutic INR result of 2.2 . Goal range 2.0-3.0. Received via fax from [employer] home INR monitoring company.        Previous INR was therapeutic    Buck was last contacted by phone: 2021    Plan     Per home monitoring agreement with patient, patient was NOT contacted regarding therapeutic result today.  Patient is to continue current dose and continue to check INR with home monitor per protocol.  ?       OBJECTIVE    INR (External)   Date Value Ref Range Status   2021 2.2 (A) 0.9 - 1.1 Final       ASSESSMENT / PLAN  No question data found.  Anticoagulation Summary  As of 2021    INR goal:  2.0-3.0   TTR:  88.7 % (1 y)   INR used for dosin.2 (2021)   Warfarin maintenance plan:  5 mg (2.5 mg x 2) every Mon, Thu; 2.5 mg (2.5 mg x 1) all other days   Full warfarin instructions:  5 mg every Mon, Thu; 2.5 mg all other days   Weekly warfarin total:  22.5 mg   No change documented:  Gisele Prince RN   Plan last modified:  Gisele Prince RN (2021)   Next INR check:     Priority:  Maintenance   Target end date:      Indications    Persistent Atrial Fibrillation [I48.91]             Anticoagulation Episode Summary     INR check location:      Preferred lab:      Send INR reminders to:  [facility]ART [facility]    Comments:  home monitor talk to wife about dose Neela   [phone]      Anticoagulation Care Providers     Provider Role Specialty Phone number    Erica Hansen APRN [facility]  Family Medicine [phone]    Everett Renee MD  Cardiovascular Disease [phone]

## 2021-08-03 ENCOUNTER — DOCUMENTATION ONLY (OUTPATIENT)
Dept: ANTICOAGULATION | Facility: CLINIC | Age: 76
End: 2021-08-03

## 2021-08-03 ENCOUNTER — TRANSFERRED RECORDS (OUTPATIENT)
Dept: HEALTH INFORMATION MANAGEMENT | Facility: CLINIC | Age: 76
End: 2021-08-03

## 2021-08-03 DIAGNOSIS — I48.91 ATRIAL FIBRILLATION (H): Primary | ICD-10-CM

## 2021-08-03 PROBLEM — J85.2 LUNG ABSCESS (H): Status: RESOLVED | Noted: 2017-10-25 | Resolved: 2017-11-08

## 2021-08-03 LAB — INR (EXTERNAL): 2.5 (ref 0.9–1.1)

## 2021-08-03 NOTE — PROGRESS NOTES
ANTICOAGULATION  MANAGEMENT-Patient Home Monitoring Result    Assessment     Therapeutic INR result of 2.5 . Goal range 2.0-3.0. Received via fax from [employer] home INR monitoring company.        Previous INR was therapeutic    Buck was last contacted by phone: 21    Plan     Per home monitoring agreement with patient, patient was NOT contacted regarding therapeutic result today.  Patient is to continue current dose and continue to check INR with home monitor per protocol.  ?       OBJECTIVE    INR (External)   Date Value Ref Range Status   2021 2.5 (A) 0.9 - 1.1 Final       ASSESSMENT / PLAN  No question data found.  Anticoagulation Summary  As of 8/3/2021    INR goal:  2.0-3.0   TTR:  88.7 % (1 y)   INR used for dosin.5 (8/3/2021)   Warfarin maintenance plan:  5 mg (2.5 mg x 2) every Mon, Thu; 2.5 mg (2.5 mg x 1) all other days   Full warfarin instructions:  5 mg every Mon, Thu; 2.5 mg all other days   Weekly warfarin total:  22.5 mg   No change documented:  Judy Hilliard RN   Plan last modified:  Gisele Prince RN (2021)   Next INR check:     Priority:  Maintenance   Target end date:      Indications    Persistent Atrial Fibrillation [I48.91]             Anticoagulation Episode Summary     INR check location:      Preferred lab:      Send INR reminders to:  [facility] HEART [facility]    Comments:  home monitor talk to wife about dose Neela   [phone]      Anticoagulation Care Providers     Provider Role Specialty Phone number    Erica Hansen APRN [facility]  Family Medicine [phone]    Everett Renee MD  Cardiovascular Disease [phone]

## 2021-08-10 ENCOUNTER — TRANSFERRED RECORDS (OUTPATIENT)
Dept: HEALTH INFORMATION MANAGEMENT | Facility: CLINIC | Age: 76
End: 2021-08-10

## 2021-08-10 ENCOUNTER — DOCUMENTATION ONLY (OUTPATIENT)
Dept: ANTICOAGULATION | Facility: CLINIC | Age: 76
End: 2021-08-10

## 2021-08-10 DIAGNOSIS — I48.91 ATRIAL FIBRILLATION (H): Primary | ICD-10-CM

## 2021-08-10 LAB — INR (EXTERNAL): 2.8 (ref 0.9–1.1)

## 2021-08-10 NOTE — PROGRESS NOTES
ANTICOAGULATION  MANAGEMENT-Patient Home Monitoring Result    Assessment     Therapeutic INR result of 2.8 . Goal range 2.0-3.0. Received via fax from [employer] home INR monitoring company.        Previous INR was therapeutic    Buck was last contacted by phone: 21    Plan     Per home monitoring agreement with patient, patient was NOT contacted regarding therapeutic result today.  Patient is to continue current dose and continue to check INR with home monitor per protocol.  ?       OBJECTIVE    INR (External)   Date Value Ref Range Status   08/10/2021 2.8 (A) 0.9 - 1.1 Final       ASSESSMENT / PLAN  No question data found.  Anticoagulation Summary  As of 8/10/2021    INR goal:  2.0-3.0   TTR:  88.7 % (1 y)   INR used for dosin.8 (8/10/2021)   Warfarin maintenance plan:  5 mg (2.5 mg x 2) every Mon, Thu; 2.5 mg (2.5 mg x 1) all other days   Full warfarin instructions:  5 mg every Mon, Thu; 2.5 mg all other days   Weekly warfarin total:  22.5 mg   No change documented:  Judy Hilliard RN   Plan last modified:  Gisele Prince RN (2021)   Next INR check:     Priority:  Maintenance   Target end date:      Indications    Persistent Atrial Fibrillation [I48.91]             Anticoagulation Episode Summary     INR check location:      Preferred lab:      Send INR reminders to:  [facility] HEART [facility]    Comments:  home monitor talk to wife about dose Neela   [phone]      Anticoagulation Care Providers     Provider Role Specialty Phone number    Erica Hansen APRN [facility]  Family Medicine [phone]    Everett Renee MD  Cardiovascular Disease [phone]

## 2021-08-14 ENCOUNTER — HEALTH MAINTENANCE LETTER (OUTPATIENT)
Age: 76
End: 2021-08-14

## 2021-08-17 ENCOUNTER — ANCILLARY PROCEDURE (OUTPATIENT)
Dept: CARDIOLOGY | Facility: CLINIC | Age: 76
End: 2021-08-17
Attending: INTERNAL MEDICINE
Payer: OTHER MISCELLANEOUS

## 2021-08-17 ENCOUNTER — DOCUMENTATION ONLY (OUTPATIENT)
Dept: ANTICOAGULATION | Facility: CLINIC | Age: 76
End: 2021-08-17

## 2021-08-17 ENCOUNTER — TRANSFERRED RECORDS (OUTPATIENT)
Dept: HEALTH INFORMATION MANAGEMENT | Facility: CLINIC | Age: 76
End: 2021-08-17

## 2021-08-17 DIAGNOSIS — I48.91 ATRIAL FIBRILLATION (H): Primary | ICD-10-CM

## 2021-08-17 DIAGNOSIS — Z95.810 DUAL ICD (IMPLANTABLE CARDIOVERTER-DEFIBRILLATOR) IN PLACE: ICD-10-CM

## 2021-08-17 LAB — INR (EXTERNAL): 2.9 (ref 0.9–1.1)

## 2021-08-17 PROCEDURE — 93295 DEV INTERROG REMOTE 1/2/MLT: CPT | Performed by: INTERNAL MEDICINE

## 2021-08-17 PROCEDURE — 93296 REM INTERROG EVL PM/IDS: CPT | Performed by: INTERNAL MEDICINE

## 2021-08-17 NOTE — PROGRESS NOTES
ANTICOAGULATION  MANAGEMENT-Patient Home Monitoring Result    Assessment     Therapeutic INR result of 2.9 . Goal range 2.0-3.0. Received via fax from [employer] home INR monitoring company.        Previous INR was therapeutic    Buck was last contacted by phone: 7/20    Plan     Per home monitoring agreement with patient, patient was NOT contacted regarding therapeutic result today.  Patient is to continue current dose and continue to check INR with home monitor per protocol.  ?       OBJECTIVE    INR (External)   Date Value Ref Range Status   08/10/2021 2.8 (A) 0.9 - 1.1 Final       ASSESSMENT / PLAN  No question data found.

## 2021-08-18 LAB
MDC_IDC_EPISODE_DTM: NORMAL
MDC_IDC_EPISODE_DURATION: 100 S
MDC_IDC_EPISODE_DURATION: 100 S
MDC_IDC_EPISODE_DURATION: 1019 S
MDC_IDC_EPISODE_DURATION: 102 S
MDC_IDC_EPISODE_DURATION: 106 S
MDC_IDC_EPISODE_DURATION: 1087 S
MDC_IDC_EPISODE_DURATION: 1090 S
MDC_IDC_EPISODE_DURATION: 1114 S
MDC_IDC_EPISODE_DURATION: 113 S
MDC_IDC_EPISODE_DURATION: 113 S
MDC_IDC_EPISODE_DURATION: 1138 S
MDC_IDC_EPISODE_DURATION: 119 S
MDC_IDC_EPISODE_DURATION: 119 S
MDC_IDC_EPISODE_DURATION: 1230 S
MDC_IDC_EPISODE_DURATION: 124 S
MDC_IDC_EPISODE_DURATION: 128 S
MDC_IDC_EPISODE_DURATION: 135 S
MDC_IDC_EPISODE_DURATION: 1372 S
MDC_IDC_EPISODE_DURATION: 139 S
MDC_IDC_EPISODE_DURATION: 141 S
MDC_IDC_EPISODE_DURATION: 144 S
MDC_IDC_EPISODE_DURATION: 144 S
MDC_IDC_EPISODE_DURATION: 1455 S
MDC_IDC_EPISODE_DURATION: 149 S
MDC_IDC_EPISODE_DURATION: 150 S
MDC_IDC_EPISODE_DURATION: 150 S
MDC_IDC_EPISODE_DURATION: 154 S
MDC_IDC_EPISODE_DURATION: 155 S
MDC_IDC_EPISODE_DURATION: 1583 S
MDC_IDC_EPISODE_DURATION: 1643 S
MDC_IDC_EPISODE_DURATION: 181 S
MDC_IDC_EPISODE_DURATION: 19 S
MDC_IDC_EPISODE_DURATION: 197 S
MDC_IDC_EPISODE_DURATION: 199 S
MDC_IDC_EPISODE_DURATION: 199 S
MDC_IDC_EPISODE_DURATION: 2073 S
MDC_IDC_EPISODE_DURATION: 220 S
MDC_IDC_EPISODE_DURATION: 225 S
MDC_IDC_EPISODE_DURATION: 2394 S
MDC_IDC_EPISODE_DURATION: 241 S
MDC_IDC_EPISODE_DURATION: 245 S
MDC_IDC_EPISODE_DURATION: 260 S
MDC_IDC_EPISODE_DURATION: 266 S
MDC_IDC_EPISODE_DURATION: 291 S
MDC_IDC_EPISODE_DURATION: 304 S
MDC_IDC_EPISODE_DURATION: 3280 S
MDC_IDC_EPISODE_DURATION: 3348 S
MDC_IDC_EPISODE_DURATION: 352 S
MDC_IDC_EPISODE_DURATION: 3978 S
MDC_IDC_EPISODE_DURATION: 40 S
MDC_IDC_EPISODE_DURATION: 4019 S
MDC_IDC_EPISODE_DURATION: 44 S
MDC_IDC_EPISODE_DURATION: 46 S
MDC_IDC_EPISODE_DURATION: 47 S
MDC_IDC_EPISODE_DURATION: 486 S
MDC_IDC_EPISODE_DURATION: 52 S
MDC_IDC_EPISODE_DURATION: 538 S
MDC_IDC_EPISODE_DURATION: 55 S
MDC_IDC_EPISODE_DURATION: 55 S
MDC_IDC_EPISODE_DURATION: 565 S
MDC_IDC_EPISODE_DURATION: 572 S
MDC_IDC_EPISODE_DURATION: 6382 S
MDC_IDC_EPISODE_DURATION: 64 S
MDC_IDC_EPISODE_DURATION: 6555 S
MDC_IDC_EPISODE_DURATION: 662 S
MDC_IDC_EPISODE_DURATION: 6652 S
MDC_IDC_EPISODE_DURATION: 67 S
MDC_IDC_EPISODE_DURATION: 6740 S
MDC_IDC_EPISODE_DURATION: 6811 S
MDC_IDC_EPISODE_DURATION: 6861 S
MDC_IDC_EPISODE_DURATION: 70 S
MDC_IDC_EPISODE_DURATION: 71 S
MDC_IDC_EPISODE_DURATION: 71 S
MDC_IDC_EPISODE_DURATION: 713 S
MDC_IDC_EPISODE_DURATION: 7167 S
MDC_IDC_EPISODE_DURATION: 74 S
MDC_IDC_EPISODE_DURATION: 759 S
MDC_IDC_EPISODE_DURATION: 76 S
MDC_IDC_EPISODE_DURATION: 78 S
MDC_IDC_EPISODE_DURATION: 8 S
MDC_IDC_EPISODE_DURATION: 80 S
MDC_IDC_EPISODE_DURATION: 8332 S
MDC_IDC_EPISODE_DURATION: 84 S
MDC_IDC_EPISODE_DURATION: 842 S
MDC_IDC_EPISODE_DURATION: 85 S
MDC_IDC_EPISODE_DURATION: 86 S
MDC_IDC_EPISODE_DURATION: 88 S
MDC_IDC_EPISODE_DURATION: 90 S
MDC_IDC_EPISODE_DURATION: 90 S
MDC_IDC_EPISODE_DURATION: 908 S
MDC_IDC_EPISODE_DURATION: 912 S
MDC_IDC_EPISODE_DURATION: 924 S
MDC_IDC_EPISODE_DURATION: 995 S
MDC_IDC_EPISODE_DURATION: NORMAL S
MDC_IDC_EPISODE_ID: 165
MDC_IDC_EPISODE_ID: 166
MDC_IDC_EPISODE_ID: 167
MDC_IDC_EPISODE_ID: 168
MDC_IDC_EPISODE_ID: 169
MDC_IDC_EPISODE_ID: 170
MDC_IDC_EPISODE_ID: 171
MDC_IDC_EPISODE_ID: 172
MDC_IDC_EPISODE_ID: 173
MDC_IDC_EPISODE_ID: 174
MDC_IDC_EPISODE_ID: 175
MDC_IDC_EPISODE_ID: 176
MDC_IDC_EPISODE_ID: 177
MDC_IDC_EPISODE_ID: 178
MDC_IDC_EPISODE_ID: 179
MDC_IDC_EPISODE_ID: 180
MDC_IDC_EPISODE_ID: 181
MDC_IDC_EPISODE_ID: 182
MDC_IDC_EPISODE_ID: 183
MDC_IDC_EPISODE_ID: 184
MDC_IDC_EPISODE_ID: 185
MDC_IDC_EPISODE_ID: 186
MDC_IDC_EPISODE_ID: 187
MDC_IDC_EPISODE_ID: 188
MDC_IDC_EPISODE_ID: 189
MDC_IDC_EPISODE_ID: 190
MDC_IDC_EPISODE_ID: 191
MDC_IDC_EPISODE_ID: 192
MDC_IDC_EPISODE_ID: 193
MDC_IDC_EPISODE_ID: 194
MDC_IDC_EPISODE_ID: 195
MDC_IDC_EPISODE_ID: 196
MDC_IDC_EPISODE_ID: 197
MDC_IDC_EPISODE_ID: 198
MDC_IDC_EPISODE_ID: 199
MDC_IDC_EPISODE_ID: 200
MDC_IDC_EPISODE_ID: 201
MDC_IDC_EPISODE_ID: 202
MDC_IDC_EPISODE_ID: 203
MDC_IDC_EPISODE_ID: 204
MDC_IDC_EPISODE_ID: 205
MDC_IDC_EPISODE_ID: 206
MDC_IDC_EPISODE_ID: 207
MDC_IDC_EPISODE_ID: 208
MDC_IDC_EPISODE_ID: 209
MDC_IDC_EPISODE_ID: 210
MDC_IDC_EPISODE_ID: 211
MDC_IDC_EPISODE_ID: 212
MDC_IDC_EPISODE_ID: 213
MDC_IDC_EPISODE_ID: 214
MDC_IDC_EPISODE_ID: 215
MDC_IDC_EPISODE_ID: 216
MDC_IDC_EPISODE_ID: 217
MDC_IDC_EPISODE_ID: 218
MDC_IDC_EPISODE_ID: 219
MDC_IDC_EPISODE_ID: 220
MDC_IDC_EPISODE_ID: 221
MDC_IDC_EPISODE_ID: 222
MDC_IDC_EPISODE_ID: 223
MDC_IDC_EPISODE_ID: 224
MDC_IDC_EPISODE_ID: 225
MDC_IDC_EPISODE_ID: 226
MDC_IDC_EPISODE_ID: 227
MDC_IDC_EPISODE_ID: 228
MDC_IDC_EPISODE_ID: 229
MDC_IDC_EPISODE_ID: 230
MDC_IDC_EPISODE_ID: 231
MDC_IDC_EPISODE_ID: 232
MDC_IDC_EPISODE_ID: 233
MDC_IDC_EPISODE_ID: 234
MDC_IDC_EPISODE_ID: 235
MDC_IDC_EPISODE_ID: 236
MDC_IDC_EPISODE_ID: 237
MDC_IDC_EPISODE_ID: 238
MDC_IDC_EPISODE_ID: 239
MDC_IDC_EPISODE_ID: 240
MDC_IDC_EPISODE_ID: 241
MDC_IDC_EPISODE_ID: 242
MDC_IDC_EPISODE_ID: 243
MDC_IDC_EPISODE_ID: 244
MDC_IDC_EPISODE_ID: 245
MDC_IDC_EPISODE_ID: 246
MDC_IDC_EPISODE_ID: 247
MDC_IDC_EPISODE_ID: 248
MDC_IDC_EPISODE_ID: 249
MDC_IDC_EPISODE_ID: 250
MDC_IDC_EPISODE_ID: 251
MDC_IDC_EPISODE_ID: 252
MDC_IDC_EPISODE_ID: 253
MDC_IDC_EPISODE_ID: 254
MDC_IDC_EPISODE_ID: 255
MDC_IDC_EPISODE_ID: 256
MDC_IDC_EPISODE_ID: 257
MDC_IDC_EPISODE_ID: 258
MDC_IDC_EPISODE_ID: 259
MDC_IDC_EPISODE_ID: 260
MDC_IDC_EPISODE_ID: 261
MDC_IDC_EPISODE_ID: 262
MDC_IDC_EPISODE_ID: 263
MDC_IDC_EPISODE_ID: 264
MDC_IDC_EPISODE_ID: 265
MDC_IDC_EPISODE_ID: 266
MDC_IDC_EPISODE_ID: 267
MDC_IDC_EPISODE_ID: 268
MDC_IDC_EPISODE_ID: 269
MDC_IDC_EPISODE_ID: 270
MDC_IDC_EPISODE_ID: 271
MDC_IDC_EPISODE_ID: 272
MDC_IDC_EPISODE_ID: 273
MDC_IDC_EPISODE_ID: 274
MDC_IDC_EPISODE_ID: 275
MDC_IDC_EPISODE_ID: 276
MDC_IDC_EPISODE_ID: 277
MDC_IDC_EPISODE_ID: 278
MDC_IDC_EPISODE_ID: 279
MDC_IDC_EPISODE_ID: 280
MDC_IDC_EPISODE_ID: 281
MDC_IDC_EPISODE_ID: 282
MDC_IDC_EPISODE_ID: 283
MDC_IDC_EPISODE_ID: 284
MDC_IDC_EPISODE_ID: 285
MDC_IDC_EPISODE_ID: 286
MDC_IDC_EPISODE_ID: 287
MDC_IDC_EPISODE_ID: 288
MDC_IDC_EPISODE_TYPE: NORMAL
MDC_IDC_LEAD_IMPLANT_DT: NORMAL
MDC_IDC_LEAD_IMPLANT_DT: NORMAL
MDC_IDC_LEAD_LOCATION: NORMAL
MDC_IDC_LEAD_LOCATION: NORMAL
MDC_IDC_LEAD_LOCATION_DETAIL_1: NORMAL
MDC_IDC_LEAD_LOCATION_DETAIL_1: NORMAL
MDC_IDC_LEAD_MFG: NORMAL
MDC_IDC_LEAD_MFG: NORMAL
MDC_IDC_LEAD_MODEL: NORMAL
MDC_IDC_LEAD_MODEL: NORMAL
MDC_IDC_LEAD_POLARITY_TYPE: NORMAL
MDC_IDC_LEAD_POLARITY_TYPE: NORMAL
MDC_IDC_LEAD_SERIAL: NORMAL
MDC_IDC_LEAD_SERIAL: NORMAL
MDC_IDC_MSMT_BATTERY_DTM: NORMAL
MDC_IDC_MSMT_BATTERY_REMAINING_LONGEVITY: 12 MO
MDC_IDC_MSMT_BATTERY_RRT_TRIGGER: 2.73
MDC_IDC_MSMT_BATTERY_STATUS: NORMAL
MDC_IDC_MSMT_BATTERY_VOLTAGE: 2.89 V
MDC_IDC_MSMT_CAP_CHARGE_DTM: NORMAL
MDC_IDC_MSMT_CAP_CHARGE_ENERGY: 18 J
MDC_IDC_MSMT_CAP_CHARGE_TIME: 4.39
MDC_IDC_MSMT_CAP_CHARGE_TYPE: NORMAL
MDC_IDC_MSMT_LEADCHNL_RA_IMPEDANCE_VALUE: 361 OHM
MDC_IDC_MSMT_LEADCHNL_RA_PACING_THRESHOLD_AMPLITUDE: 1.25 V
MDC_IDC_MSMT_LEADCHNL_RA_PACING_THRESHOLD_PULSEWIDTH: 0.4 MS
MDC_IDC_MSMT_LEADCHNL_RA_SENSING_INTR_AMPL: 0.25 MV
MDC_IDC_MSMT_LEADCHNL_RA_SENSING_INTR_AMPL: 0.25 MV
MDC_IDC_MSMT_LEADCHNL_RV_IMPEDANCE_VALUE: 342 OHM
MDC_IDC_MSMT_LEADCHNL_RV_IMPEDANCE_VALUE: 399 OHM
MDC_IDC_MSMT_LEADCHNL_RV_PACING_THRESHOLD_AMPLITUDE: 0.5 V
MDC_IDC_MSMT_LEADCHNL_RV_PACING_THRESHOLD_PULSEWIDTH: 0.4 MS
MDC_IDC_MSMT_LEADCHNL_RV_SENSING_INTR_AMPL: 8.62 MV
MDC_IDC_MSMT_LEADCHNL_RV_SENSING_INTR_AMPL: 8.62 MV
MDC_IDC_PG_IMPLANT_DTM: NORMAL
MDC_IDC_PG_MFG: NORMAL
MDC_IDC_PG_MODEL: NORMAL
MDC_IDC_PG_SERIAL: NORMAL
MDC_IDC_PG_TYPE: NORMAL
MDC_IDC_SESS_CLINIC_NAME: NORMAL
MDC_IDC_SESS_DTM: NORMAL
MDC_IDC_SESS_TYPE: NORMAL
MDC_IDC_SET_BRADY_AT_MODE_SWITCH_RATE: 171 {BEATS}/MIN
MDC_IDC_SET_BRADY_HYSTRATE: NORMAL
MDC_IDC_SET_BRADY_LOWRATE: 60 {BEATS}/MIN
MDC_IDC_SET_BRADY_MAX_SENSOR_RATE: 120 {BEATS}/MIN
MDC_IDC_SET_BRADY_MAX_TRACKING_RATE: 130 {BEATS}/MIN
MDC_IDC_SET_BRADY_MODE: NORMAL
MDC_IDC_SET_BRADY_PAV_DELAY_LOW: 340 MS
MDC_IDC_SET_BRADY_SAV_DELAY_LOW: 340 MS
MDC_IDC_SET_LEADCHNL_RA_PACING_AMPLITUDE: 2 V
MDC_IDC_SET_LEADCHNL_RA_PACING_ANODE_ELECTRODE_1: NORMAL
MDC_IDC_SET_LEADCHNL_RA_PACING_ANODE_LOCATION_1: NORMAL
MDC_IDC_SET_LEADCHNL_RA_PACING_CAPTURE_MODE: NORMAL
MDC_IDC_SET_LEADCHNL_RA_PACING_CATHODE_ELECTRODE_1: NORMAL
MDC_IDC_SET_LEADCHNL_RA_PACING_CATHODE_LOCATION_1: NORMAL
MDC_IDC_SET_LEADCHNL_RA_PACING_POLARITY: NORMAL
MDC_IDC_SET_LEADCHNL_RA_PACING_PULSEWIDTH: 0.4 MS
MDC_IDC_SET_LEADCHNL_RA_SENSING_ANODE_ELECTRODE_1: NORMAL
MDC_IDC_SET_LEADCHNL_RA_SENSING_ANODE_LOCATION_1: NORMAL
MDC_IDC_SET_LEADCHNL_RA_SENSING_CATHODE_ELECTRODE_1: NORMAL
MDC_IDC_SET_LEADCHNL_RA_SENSING_CATHODE_LOCATION_1: NORMAL
MDC_IDC_SET_LEADCHNL_RA_SENSING_POLARITY: NORMAL
MDC_IDC_SET_LEADCHNL_RA_SENSING_SENSITIVITY: 0.15 MV
MDC_IDC_SET_LEADCHNL_RV_PACING_AMPLITUDE: 1.5 V
MDC_IDC_SET_LEADCHNL_RV_PACING_ANODE_ELECTRODE_1: NORMAL
MDC_IDC_SET_LEADCHNL_RV_PACING_ANODE_LOCATION_1: NORMAL
MDC_IDC_SET_LEADCHNL_RV_PACING_CAPTURE_MODE: NORMAL
MDC_IDC_SET_LEADCHNL_RV_PACING_CATHODE_ELECTRODE_1: NORMAL
MDC_IDC_SET_LEADCHNL_RV_PACING_CATHODE_LOCATION_1: NORMAL
MDC_IDC_SET_LEADCHNL_RV_PACING_POLARITY: NORMAL
MDC_IDC_SET_LEADCHNL_RV_PACING_PULSEWIDTH: 0.4 MS
MDC_IDC_SET_LEADCHNL_RV_SENSING_ANODE_ELECTRODE_1: NORMAL
MDC_IDC_SET_LEADCHNL_RV_SENSING_ANODE_LOCATION_1: NORMAL
MDC_IDC_SET_LEADCHNL_RV_SENSING_CATHODE_ELECTRODE_1: NORMAL
MDC_IDC_SET_LEADCHNL_RV_SENSING_CATHODE_LOCATION_1: NORMAL
MDC_IDC_SET_LEADCHNL_RV_SENSING_POLARITY: NORMAL
MDC_IDC_SET_LEADCHNL_RV_SENSING_SENSITIVITY: 0.45 MV
MDC_IDC_SET_ZONE_DETECTION_BEATS_DENOMINATOR: 32 {BEATS}
MDC_IDC_SET_ZONE_DETECTION_BEATS_NUMERATOR: 24 {BEATS}
MDC_IDC_SET_ZONE_DETECTION_INTERVAL: 200 MS
MDC_IDC_SET_ZONE_DETECTION_INTERVAL: 240 MS
MDC_IDC_SET_ZONE_DETECTION_INTERVAL: 320 MS
MDC_IDC_SET_ZONE_DETECTION_INTERVAL: 350 MS
MDC_IDC_SET_ZONE_DETECTION_INTERVAL: 360 MS
MDC_IDC_SET_ZONE_DETECTION_INTERVAL: 360 MS
MDC_IDC_SET_ZONE_TYPE: NORMAL
MDC_IDC_STAT_AT_BURDEN_PERCENT: 22.9 %
MDC_IDC_STAT_AT_DTM_END: NORMAL
MDC_IDC_STAT_AT_DTM_START: NORMAL
MDC_IDC_STAT_BRADY_AP_VP_PERCENT: 59.22 %
MDC_IDC_STAT_BRADY_AP_VS_PERCENT: 11.24 %
MDC_IDC_STAT_BRADY_AS_VP_PERCENT: 14.17 %
MDC_IDC_STAT_BRADY_AS_VS_PERCENT: 15.36 %
MDC_IDC_STAT_BRADY_DTM_END: NORMAL
MDC_IDC_STAT_BRADY_DTM_START: NORMAL
MDC_IDC_STAT_BRADY_RA_PERCENT_PACED: 66.15 %
MDC_IDC_STAT_BRADY_RV_PERCENT_PACED: 73.58 %
MDC_IDC_STAT_EPISODE_RECENT_COUNT: 0
MDC_IDC_STAT_EPISODE_RECENT_COUNT: 129
MDC_IDC_STAT_EPISODE_RECENT_COUNT_DTM_END: NORMAL
MDC_IDC_STAT_EPISODE_RECENT_COUNT_DTM_START: NORMAL
MDC_IDC_STAT_EPISODE_TOTAL_COUNT: 0
MDC_IDC_STAT_EPISODE_TOTAL_COUNT: 1
MDC_IDC_STAT_EPISODE_TOTAL_COUNT: 513
MDC_IDC_STAT_EPISODE_TOTAL_COUNT: 6
MDC_IDC_STAT_EPISODE_TOTAL_COUNT_DTM_END: NORMAL
MDC_IDC_STAT_EPISODE_TOTAL_COUNT_DTM_START: NORMAL
MDC_IDC_STAT_EPISODE_TYPE: NORMAL
MDC_IDC_STAT_TACHYTHERAPY_ATP_DELIVERED_RECENT: 0
MDC_IDC_STAT_TACHYTHERAPY_ATP_DELIVERED_TOTAL: 0
MDC_IDC_STAT_TACHYTHERAPY_RECENT_DTM_END: NORMAL
MDC_IDC_STAT_TACHYTHERAPY_RECENT_DTM_START: NORMAL
MDC_IDC_STAT_TACHYTHERAPY_SHOCKS_ABORTED_RECENT: 0
MDC_IDC_STAT_TACHYTHERAPY_SHOCKS_ABORTED_TOTAL: 0
MDC_IDC_STAT_TACHYTHERAPY_SHOCKS_DELIVERED_RECENT: 0
MDC_IDC_STAT_TACHYTHERAPY_SHOCKS_DELIVERED_TOTAL: 2
MDC_IDC_STAT_TACHYTHERAPY_TOTAL_DTM_END: NORMAL
MDC_IDC_STAT_TACHYTHERAPY_TOTAL_DTM_START: NORMAL

## 2021-08-24 ENCOUNTER — TRANSFERRED RECORDS (OUTPATIENT)
Dept: HEALTH INFORMATION MANAGEMENT | Facility: CLINIC | Age: 76
End: 2021-08-24

## 2021-08-24 ENCOUNTER — DOCUMENTATION ONLY (OUTPATIENT)
Dept: ANTICOAGULATION | Facility: CLINIC | Age: 76
End: 2021-08-24

## 2021-08-24 DIAGNOSIS — I48.91 ATRIAL FIBRILLATION (H): Primary | ICD-10-CM

## 2021-08-24 LAB — INR (EXTERNAL): 2.3 (ref 0.9–1.1)

## 2021-08-24 NOTE — PROGRESS NOTES
ANTICOAGULATION  MANAGEMENT-Patient Home Monitoring Result    Assessment     Therapeutic INR result of 2.3 . Goal range 2.0-3.0. Received via fax from [employer] home INR monitoring company.        Previous INR was therapeutic    Buck was last contacted by phone: 21    Plan     Per home monitoring agreement with patient, patient was NOT contacted regarding therapeutic result today.  Patient is to continue current dose and continue to check INR with home monitor per protocol.  ?       OBJECTIVE    INR (External)   Date Value Ref Range Status   2021 2.3 (A) 0.9 - 1.1 Final       ASSESSMENT / PLAN  No question data found.  Anticoagulation Summary  As of 2021    INR goal:  2.0-3.0   TTR:  88.7 % (1 y)   INR used for dosin.3 (2021)   Warfarin maintenance plan:  5 mg (2.5 mg x 2) every Mon, Thu; 2.5 mg (2.5 mg x 1) all other days   Full warfarin instructions:  5 mg every Mon, Thu; 2.5 mg all other days   Weekly warfarin total:  22.5 mg   Plan last modified:  Gisele Prince, RN (2021)   Next INR check:     Priority:  Maintenance   Target end date:      Indications    Persistent Atrial Fibrillation [I48.91]             Anticoagulation Episode Summary     INR check location:      Preferred lab:      Send INR reminders to:  [facility] HEART [facility]    Comments:  home monitor talk to wife about dose Neela   [phone]      Anticoagulation Care Providers     Provider Role Specialty Phone number    Erica Hansen APRN [facility]  Family Medicine [phone]    Everett Renee MD  Cardiovascular Disease [phone]

## 2021-08-31 ENCOUNTER — TRANSFERRED RECORDS (OUTPATIENT)
Dept: HEALTH INFORMATION MANAGEMENT | Facility: CLINIC | Age: 76
End: 2021-08-31

## 2021-08-31 ENCOUNTER — DOCUMENTATION ONLY (OUTPATIENT)
Dept: ANTICOAGULATION | Facility: CLINIC | Age: 76
End: 2021-08-31

## 2021-08-31 DIAGNOSIS — I48.91 ATRIAL FIBRILLATION (H): Primary | ICD-10-CM

## 2021-08-31 LAB — INR (EXTERNAL): 2.2 (ref 0.9–1.1)

## 2021-08-31 NOTE — PROGRESS NOTES
ANTICOAGULATION  MANAGEMENT-Patient Home Monitoring Result    Assessment     Therapeutic INR result of 2.2 . Goal range 2.0-3.0. Received via fax from [employer] home INR monitoring company.        Previous INR was therapeutic    Buck was last contacted by phone: 21    Plan     Per home monitoring agreement with patient, patient was NOT contacted regarding therapeutic result today.  Patient is to continue current dose and continue to check INR with home monitor per protocol.  ?       OBJECTIVE    INR (External)   Date Value Ref Range Status   2021 2.2 (A) 0.9 - 1.1 Final       ASSESSMENT / PLAN  No question data found.  Anticoagulation Summary  As of 2021    INR goal:  2.0-3.0   TTR:  88.7 % (1 y)   INR used for dosin.2 (2021)   Warfarin maintenance plan:  5 mg (2.5 mg x 2) every Mon, Thu; 2.5 mg (2.5 mg x 1) all other days   Full warfarin instructions:  5 mg every Mon, Thu; 2.5 mg all other days   Weekly warfarin total:  22.5 mg   No change documented:  Judy Hilliard RN   Plan last modified:  Gisele Prince RN (2021)   Next INR check:     Priority:  Maintenance   Target end date:      Indications    Persistent Atrial Fibrillation [I48.91]             Anticoagulation Episode Summary     INR check location:      Preferred lab:      Send INR reminders to:  [facility] HEART [facility]    Comments:  home monitor talk to wife about dose Neela   [phone]      Anticoagulation Care Providers     Provider Role Specialty Phone number    Erica Hansen APRN [facility]  Family Medicine [phone]    Everett Renee MD  Cardiovascular Disease [phone]

## 2021-09-07 ENCOUNTER — DOCUMENTATION ONLY (OUTPATIENT)
Dept: ANTICOAGULATION | Facility: CLINIC | Age: 76
End: 2021-09-07

## 2021-09-07 ENCOUNTER — TRANSFERRED RECORDS (OUTPATIENT)
Dept: HEALTH INFORMATION MANAGEMENT | Facility: CLINIC | Age: 76
End: 2021-09-07

## 2021-09-07 DIAGNOSIS — I48.91 ATRIAL FIBRILLATION (H): Primary | ICD-10-CM

## 2021-09-07 LAB — INR (EXTERNAL): 2.7 (ref 0.9–1.1)

## 2021-09-07 NOTE — PROGRESS NOTES
ANTICOAGULATION  MANAGEMENT-Patient Home Monitoring Result    Assessment     Therapeutic INR result of 2.7 . Goal range 2.0-3.0. Received via fax from [employer] home INR monitoring company.        Previous INR was therapeutic    Buck was last contacted by phone: 21    Plan     Per home monitoring agreement with patient, patient was NOT contacted regarding therapeutic result today.  Patient is to continue current dose and continue to check INR with home monitor per protocol.  ?       OBJECTIVE    INR (External)   Date Value Ref Range Status   2021 2.7 (A) 0.9 - 1.1 Final       ASSESSMENT / PLAN  No question data found.  Anticoagulation Summary  As of 2021    INR goal:  2.0-3.0   TTR:  88.7 % (1 y)   INR used for dosin.7 (2021)   Warfarin maintenance plan:  5 mg (2.5 mg x 2) every Mon, Thu; 2.5 mg (2.5 mg x 1) all other days   Full warfarin instructions:  5 mg every Mon, Thu; 2.5 mg all other days   Weekly warfarin total:  22.5 mg   Plan last modified:  Gisele Prince, RN (2021)   Next INR check:     Priority:  Maintenance   Target end date:      Indications    Persistent Atrial Fibrillation [I48.91]             Anticoagulation Episode Summary     INR check location:      Preferred lab:      Send INR reminders to:  [facility] HEART [facility]    Comments:  home monitor talk to wife about dose Neela   [phone]      Anticoagulation Care Providers     Provider Role Specialty Phone number    Erica Hansen APRN [facility]  Family Medicine [phone]    Everett Renee MD  Cardiovascular Disease [phone]

## 2021-09-09 ENCOUNTER — LAB REQUISITION (OUTPATIENT)
Dept: LAB | Facility: CLINIC | Age: 76
End: 2021-09-09
Payer: COMMERCIAL

## 2021-09-09 LAB
ANION GAP SERPL CALCULATED.3IONS-SCNC: 10 MMOL/L (ref 5–18)
BUN SERPL-MCNC: 31 MG/DL (ref 8–28)
CALCIUM SERPL-MCNC: 9.3 MG/DL (ref 8.5–10.5)
CHLORIDE BLD-SCNC: 107 MMOL/L (ref 98–107)
CHOLEST SERPL-MCNC: 117 MG/DL
CO2 SERPL-SCNC: 25 MMOL/L (ref 22–31)
CREAT SERPL-MCNC: 1.43 MG/DL (ref 0.7–1.3)
GFR SERPL CREATININE-BSD FRML MDRD: 47 ML/MIN/1.73M2
GLUCOSE BLD-MCNC: 112 MG/DL (ref 70–125)
HDLC SERPL-MCNC: 54 MG/DL
LDLC SERPL CALC-MCNC: 54 MG/DL
MAGNESIUM SERPL-MCNC: 2.1 MG/DL (ref 1.8–2.6)
POTASSIUM BLD-SCNC: 4.7 MMOL/L (ref 3.5–5)
SODIUM SERPL-SCNC: 142 MMOL/L (ref 136–145)
TRIGL SERPL-MCNC: 43 MG/DL

## 2021-09-09 PROCEDURE — 83735 ASSAY OF MAGNESIUM: CPT | Mod: ORL | Performed by: FAMILY MEDICINE

## 2021-09-09 PROCEDURE — 80061 LIPID PANEL: CPT | Mod: ORL | Performed by: FAMILY MEDICINE

## 2021-09-09 PROCEDURE — 80048 BASIC METABOLIC PNL TOTAL CA: CPT | Mod: ORL | Performed by: FAMILY MEDICINE

## 2021-09-14 ENCOUNTER — DOCUMENTATION ONLY (OUTPATIENT)
Dept: ANTICOAGULATION | Facility: CLINIC | Age: 76
End: 2021-09-14

## 2021-09-14 ENCOUNTER — TRANSFERRED RECORDS (OUTPATIENT)
Dept: HEALTH INFORMATION MANAGEMENT | Facility: CLINIC | Age: 76
End: 2021-09-14

## 2021-09-14 DIAGNOSIS — I48.91 ATRIAL FIBRILLATION (H): Primary | ICD-10-CM

## 2021-09-14 LAB — INR (EXTERNAL): 2.5 (ref 0.9–1.1)

## 2021-09-14 NOTE — PROGRESS NOTES
ANTICOAGULATION  MANAGEMENT-Patient Home Monitoring Result    Assessment     Therapeutic INR result of 2.5 . Goal range 2.0-3.0. Received via fax from [employer] home INR monitoring company.        Previous INR was therapeutic    Buck was last contacted by phone: 21    Plan     Per home monitoring agreement with patient, patient was NOT contacted regarding therapeutic result today.  Patient is to continue current dose and continue to check INR with home monitor per protocol.  ?       OBJECTIVE    INR (External)   Date Value Ref Range Status   2021 2.5 (A) 0.9 - 1.1 Final       ASSESSMENT / PLAN  No question data found.  Anticoagulation Summary  As of 2021    INR goal:  2.0-3.0   TTR:  88.7 % (1 y)   INR used for dosin.5 (2021)   Warfarin maintenance plan:  5 mg (2.5 mg x 2) every Mon, Thu; 2.5 mg (2.5 mg x 1) all other days   Full warfarin instructions:  5 mg every Mon, Thu; 2.5 mg all other days   Weekly warfarin total:  22.5 mg   Plan last modified:  Gisele Prince, RN (2021)   Next INR check:     Priority:  Maintenance   Target end date:      Indications    Persistent Atrial Fibrillation [I48.91]             Anticoagulation Episode Summary     INR check location:      Preferred lab:      Send INR reminders to:  [facility] HEART [facility]    Comments:  home monitor talk to wife about dose Neela   [phone]      Anticoagulation Care Providers     Provider Role Specialty Phone number    Erica Hansen APRN [facility]  Family Medicine [phone]    Everett Renee MD  Cardiovascular Disease [phone]

## 2021-09-21 ENCOUNTER — ANTICOAGULATION THERAPY VISIT (OUTPATIENT)
Dept: ANTICOAGULATION | Facility: CLINIC | Age: 76
End: 2021-09-21

## 2021-09-21 ENCOUNTER — TRANSFERRED RECORDS (OUTPATIENT)
Dept: HEALTH INFORMATION MANAGEMENT | Facility: CLINIC | Age: 76
End: 2021-09-21

## 2021-09-21 DIAGNOSIS — I48.91 ATRIAL FIBRILLATION (H): Primary | ICD-10-CM

## 2021-09-21 LAB — INR (EXTERNAL): 2.2 (ref 0.9–1.1)

## 2021-09-21 NOTE — PROGRESS NOTES
ANTICOAGULATION  MANAGEMENT-Patient Home Monitoring Result    Assessment     Therapeutic INR result of 2.2 . Goal range 2.0-3.0. Received via fax from [employer] home INR monitoring company.        Previous INR was therapeutic    Buck was last contacted by phone: 21    Plan     Per home monitoring agreement with patient, patient was NOT contacted regarding therapeutic result today.  Patient is to continue current dose and continue to check INR with home monitor per protocol.  ?       OBJECTIVE    INR (External)   Date Value Ref Range Status   2021 2.2 (A) 0.9 - 1.1 Final       ASSESSMENT / PLAN  No question data found.  Anticoagulation Summary  As of 2021    INR goal:  2.0-3.0   TTR:  88.7 % (1 y)   INR used for dosin.2 (2021)   Warfarin maintenance plan:  5 mg (2.5 mg x 2) every Mon, Thu; 2.5 mg (2.5 mg x 1) all other days   Full warfarin instructions:  5 mg every Mon, Thu; 2.5 mg all other days   Weekly warfarin total:  22.5 mg   No change documented:  Daysi Acriniega RN   Plan last modified:  Gisele Prince RN (2021)   Next INR check:  10/5/2021   Priority:  Maintenance   Target end date:      Indications    Persistent Atrial Fibrillation [I48.91]             Anticoagulation Episode Summary     INR check location:      Preferred lab:      Send INR reminders to:  [facility] HEART [facility]    Comments:  home monitor talk to wife about dose Neela   [phone]      Anticoagulation Care Providers     Provider Role Specialty Phone number    Erica Hansen APRN [facility]  Family Medicine [phone]    Everett Renee MD  Cardiovascular Disease [phone]

## 2021-09-22 DIAGNOSIS — Z95.810 ICD (IMPLANTABLE CARDIOVERTER-DEFIBRILLATOR) IN PLACE: Primary | ICD-10-CM

## 2021-09-23 DIAGNOSIS — Z95.810 ICD (IMPLANTABLE CARDIOVERTER-DEFIBRILLATOR) IN PLACE: Primary | ICD-10-CM

## 2021-09-23 RX ORDER — MAGNESIUM 64 MG (MAGNESIUM CHLORIDE) TABLET,DELAYED RELEASE
Qty: 180 TABLET | Refills: 3 | Status: SHIPPED | OUTPATIENT
Start: 2021-09-23 | End: 2021-11-12

## 2021-09-24 ENCOUNTER — TELEPHONE (OUTPATIENT)
Dept: CARDIOLOGY | Facility: CLINIC | Age: 76
End: 2021-09-24

## 2021-09-24 ENCOUNTER — DOCUMENTATION ONLY (OUTPATIENT)
Dept: CARDIOLOGY | Facility: CLINIC | Age: 76
End: 2021-09-24

## 2021-09-24 DIAGNOSIS — I48.91 ATRIAL FIBRILLATION (H): Primary | ICD-10-CM

## 2021-09-24 DIAGNOSIS — I42.9 CARDIOMYOPATHY, UNSPECIFIED TYPE (H): ICD-10-CM

## 2021-09-24 RX ORDER — FUROSEMIDE 40 MG
40 TABLET ORAL DAILY
Qty: 90 TABLET | Refills: 3 | Status: SHIPPED | OUTPATIENT
Start: 2021-09-24 | End: 2021-11-12

## 2021-09-24 RX ORDER — WARFARIN SODIUM 2.5 MG/1
TABLET ORAL
Qty: 90 TABLET | Refills: 0 | Status: SHIPPED | OUTPATIENT
Start: 2021-09-24 | End: 2021-12-13

## 2021-09-24 RX ORDER — LOSARTAN POTASSIUM 50 MG/1
50 TABLET ORAL DAILY
Qty: 90 TABLET | Refills: 3 | Status: SHIPPED | OUTPATIENT
Start: 2021-09-24 | End: 2022-04-21

## 2021-09-24 NOTE — TELEPHONE ENCOUNTER
----- Message from Priscilla Kumar sent at 9/24/2021  9:43 AM CDT -----  Regarding: GAG pt  Patient has already contacted their pharmacy. The medication or refill issue is below:    Ordering Cardiologist: GAG  Medication: furosemide, warfarin, and metoprolol  Issue / Concern: Patient is leaving out of town this weekend  Preferred Pharmacy/City: [facility] #[id number] [place] [address] AT [facility] [address]  Best Phone Number for Patient: [phone]    Additional Info:

## 2021-09-24 NOTE — PROGRESS NOTES
Attempted PA. Unable to process in cover my meds. Called [employer] as indicated. Warfarin was submitted to workers comp claim.    Called [facility] and left message on provider line to re-bill through patients insurance or initiate work comp    Almita Haile, [facility]

## 2021-09-24 NOTE — TELEPHONE ENCOUNTER
Noted.  Phone call to pateint and his wife, pt needs a refill on Magnesium, not metoprolol. She also states he needs a refill on losartan.  Doses reviewed, will send refills and ask his anticoagulation team to fill his Warfarin.

## 2021-09-27 ENCOUNTER — TELEPHONE (OUTPATIENT)
Dept: CARDIOLOGY | Facility: CLINIC | Age: 76
End: 2021-09-27

## 2021-09-27 ENCOUNTER — TELEPHONE (OUTPATIENT)
Facility: CLINIC | Age: 76
End: 2021-09-27

## 2021-09-27 NOTE — TELEPHONE ENCOUNTER
Prior Authorization Not Needed per Insurance    Medication: Warfarin PA - not needed  Insurance Company: [employer] - Phone [phone] Fax [phone]  Expected CoPay:      Pharmacy Filling the Rx: [facility][id number] [place], [place] - [facility] AT [facility] [address]  Pharmacy Notified: Yes  Patient Notified: Yes    No PA is needed for this medication, CMM key is through a workers comp claim which is incorrect.     Called [facility] and they state this was sent in error, they have a paid claim through his personal insurance through [employer]. Closing encounter.

## 2021-09-27 NOTE — TELEPHONE ENCOUNTER
Pa for Lasix. Pt does have Work comp. The number I have for contact is work comp  [phone]. It is a couple years old?   Cover My Meds [id number]

## 2021-09-27 NOTE — TELEPHONE ENCOUNTER
KD    FYI    Routing comment      Almita Haile [facility] 3 days ago     NOHEMI       Attempted PA. Unable to process in cover my meds. Called [facility] as indicated. Warfarin was submitted to workers comp claim.     Called neisha and left message on provider line to re-bill through patients insurance or initiate work comp     Almita Haile [facility]              Documentation      [id number] [address] AT [facility] [address] 3 days ago     Val Angeles, RN  [facility]; [facility] 3 days ago     SHIRA    I have never seen a PA for warfarin before...    Routing comment      Val Angeles, RN 3 days ago     SHIRA       Pharmacy and Anticoagulation Team-  Please see PA below  Thank you  Marta

## 2021-09-28 ENCOUNTER — DOCUMENTATION ONLY (OUTPATIENT)
Dept: ANTICOAGULATION | Facility: CLINIC | Age: 76
End: 2021-09-28

## 2021-09-28 ENCOUNTER — TRANSFERRED RECORDS (OUTPATIENT)
Dept: HEALTH INFORMATION MANAGEMENT | Facility: CLINIC | Age: 76
End: 2021-09-28

## 2021-09-28 DIAGNOSIS — I48.91 ATRIAL FIBRILLATION (H): Primary | ICD-10-CM

## 2021-09-28 LAB — INR (EXTERNAL): 2.3 (ref 2–3)

## 2021-09-28 NOTE — TELEPHONE ENCOUNTER
Central Prior Authorization Team   Phone: [phone]    PA Initiation    Medication:   Insurance Company:    Pharmacy Filling the Rx: [facility][id number] [place], [place] - [facility] AT [facility] [address]  Filling Pharmacy Phone: [phone]  Filling Pharmacy Fax: [phone]  Start Date: 9/28/2021

## 2021-09-28 NOTE — PROGRESS NOTES
ANTICOAGULATION  MANAGEMENT-Home Monitor Managed by Exception    Buck Sinclair 76 year old male is on warfarin with therapeutic INR result. (Goal INR 2.0-3.0)    Recent labs: (last 7 days)     09/28/21  0900   INR 2.3*         Previous INR was Therapeutic    Medication, diet, health changes since last INR:chart reviewed; none idientified    Contacted within the last 12 weeks by phone on 7/20/21      LUCAS     Buck was NOT contacted regarding therapeutic result today per home monitoring policy manage by exception agreement.   Current warfarin dose is to be continued:     Summary  As of 9/28/2021    Full warfarin instructions:  5 mg every Mon, Thu; 2.5 mg all other days   Next INR check:             ?   Daysi Arciniega RN  Anticoagulation Clinic  9/28/2021    _______________________________________________________________________     Anticoagulation Episode Summary     Current INR goal:  2.0-3.0   TTR:  88.7 % (1 y)   Target end date:     Send INR reminders to:  [facility] HEART [facility]    Indications    Persistent Atrial Fibrillation [I48.91]           Comments:  home monitor talk to wife about dose Neela   [phone]         Anticoagulation Care Providers     Provider Role Specialty Phone number    Erica Hansen APRN [facility]  Family Medicine [phone]    Everett Renee MD  Cardiovascular Disease [phone]

## 2021-09-30 NOTE — TELEPHONE ENCOUNTER
Pharmacy handles W/C insurance PA requests.  Spoke to pharmacy, they have the medication ready for patient.

## 2021-10-05 ENCOUNTER — DOCUMENTATION ONLY (OUTPATIENT)
Dept: ANTICOAGULATION | Facility: CLINIC | Age: 76
End: 2021-10-05

## 2021-10-05 ENCOUNTER — TRANSFERRED RECORDS (OUTPATIENT)
Dept: HEALTH INFORMATION MANAGEMENT | Facility: CLINIC | Age: 76
End: 2021-10-05

## 2021-10-05 DIAGNOSIS — I48.91 ATRIAL FIBRILLATION (H): Primary | ICD-10-CM

## 2021-10-05 LAB — INR (EXTERNAL): 2.2 (ref 2–3)

## 2021-10-05 NOTE — PROGRESS NOTES
ANTICOAGULATION  MANAGEMENT-Home Monitor Managed by Exception    Buck Sinclair 76 year old male is on warfarin with therapeutic INR result. (Goal INR 2.0-3.0)    Recent labs: (last 7 days)     10/05/21  0813   INR 2.2*         Previous INR was Therapeutic    Medication, diet, health changes since last INR:chart reviewed; none idientified    Contacted within the last 12 weeks by phone on 7/20/21      LUCAS Jane was NOT contacted regarding therapeutic result today per home monitoring policy manage by exception agreement.   Current warfarin dose is to be continued:     Summary  As of 10/5/2021    Full warfarin instructions:  5 mg every Mon, Thu; 2.5 mg all other days   Next INR check:  10/12/2021           ?   Fatoumata Ochoa, RN  Anticoagulation Clinic  10/5/2021    _______________________________________________________________________     Anticoagulation Episode Summary     Current INR goal:  2.0-3.0   TTR:  88.7 % (1 y)   Target end date:     Send INR reminders to:  [facility]    Indications    Persistent Atrial Fibrillation [I48.91]           Comments:  home monitor talk to wife about dose Neela   [phone]         Anticoagulation Care Providers     Provider Role Specialty Phone number    Erica Hansen APRN [facility]  Family Medicine [phone]    Everett Renee MD  Cardiovascular Disease [phone]

## 2021-10-09 ENCOUNTER — HEALTH MAINTENANCE LETTER (OUTPATIENT)
Age: 76
End: 2021-10-09

## 2021-10-12 ENCOUNTER — DOCUMENTATION ONLY (OUTPATIENT)
Dept: ANTICOAGULATION | Facility: CLINIC | Age: 76
End: 2021-10-12

## 2021-10-12 ENCOUNTER — TRANSFERRED RECORDS (OUTPATIENT)
Dept: HEALTH INFORMATION MANAGEMENT | Facility: CLINIC | Age: 76
End: 2021-10-12
Payer: COMMERCIAL

## 2021-10-12 LAB — INR (EXTERNAL): 2.8 (ref 0.9–1.1)

## 2021-10-12 NOTE — PROGRESS NOTES
ANTICOAGULATION  MANAGEMENT-Home Monitor Managed by Exception    Buck JONES Sincalir 76 year old male is on warfarin with therapeutic INR result. (Goal INR 2.0-3.0)    Recent labs: (last 7 days)     10/12/21  0700   INR 2.8*         Previous INR was Therapeutic    Medication, diet, health changes since last INR:chart reviewed; none idientified    Contacted within the last 12 weeks by phone on 7/20/21    Due for 3 months telephone call f/u on next INR.      PLAN     Buck was NOT contacted regarding therapeutic result today per home monitoring policy manage by exception agreement.   Current warfarin dose is to be continued:     Summary  As of 10/12/2021    Full warfarin instructions:  5 mg every Mon, Thu; 2.5 mg all other days   Next INR check:  10/26/2021           ?   Daysi Arciniega RN  Anticoagulation Clinic  10/12/2021    _______________________________________________________________________     Anticoagulation Episode Summary     Current INR goal:  2.0-3.0   TTR:  88.7 % (1 y)   Target end date:     Send INR reminders to:  [facility]ART [facility]    Indications    Persistent Atrial Fibrillation [I48.91]           Comments:  home monitor talk to wife about dose Neela   [phone]         Anticoagulation Care Providers     Provider Role Specialty Phone number    Erica Hansen APRN [facility]  Family Medicine [phone]    Everett Renee MD  Cardiovascular Disease [phone]

## 2021-10-19 ENCOUNTER — ANTICOAGULATION THERAPY VISIT (OUTPATIENT)
Dept: ANTICOAGULATION | Facility: CLINIC | Age: 76
End: 2021-10-19

## 2021-10-19 ENCOUNTER — DOCUMENTATION ONLY (OUTPATIENT)
Dept: ANTICOAGULATION | Facility: CLINIC | Age: 76
End: 2021-10-19

## 2021-10-19 ENCOUNTER — LAB REQUISITION (OUTPATIENT)
Dept: LAB | Facility: CLINIC | Age: 76
End: 2021-10-19

## 2021-10-19 ENCOUNTER — TRANSFERRED RECORDS (OUTPATIENT)
Dept: HEALTH INFORMATION MANAGEMENT | Facility: CLINIC | Age: 76
End: 2021-10-19
Payer: COMMERCIAL

## 2021-10-19 DIAGNOSIS — I48.19 PERSISTENT ATRIAL FIBRILLATION (H): Primary | ICD-10-CM

## 2021-10-19 DIAGNOSIS — N17.9 ACUTE KIDNEY FAILURE, UNSPECIFIED (H): ICD-10-CM

## 2021-10-19 DIAGNOSIS — R82.90 UNSPECIFIED ABNORMAL FINDINGS IN URINE: ICD-10-CM

## 2021-10-19 DIAGNOSIS — I48.91 ATRIAL FIBRILLATION (H): Primary | ICD-10-CM

## 2021-10-19 LAB
ANION GAP SERPL CALCULATED.3IONS-SCNC: 7 MMOL/L (ref 5–18)
BUN SERPL-MCNC: 30 MG/DL (ref 8–28)
CALCIUM SERPL-MCNC: 9.5 MG/DL (ref 8.5–10.5)
CHLORIDE BLD-SCNC: 110 MMOL/L (ref 98–107)
CO2 SERPL-SCNC: 25 MMOL/L (ref 22–31)
CREAT SERPL-MCNC: 1.1 MG/DL (ref 0.7–1.3)
GFR SERPL CREATININE-BSD FRML MDRD: 65 ML/MIN/1.73M2
GLUCOSE BLD-MCNC: 111 MG/DL (ref 70–125)
INR (EXTERNAL): 2.9 (ref 0.9–1.1)
POTASSIUM BLD-SCNC: 4.6 MMOL/L (ref 3.5–5)
SODIUM SERPL-SCNC: 142 MMOL/L (ref 136–145)

## 2021-10-19 PROCEDURE — 80048 BASIC METABOLIC PNL TOTAL CA: CPT | Performed by: FAMILY MEDICINE

## 2021-10-19 PROCEDURE — 87086 URINE CULTURE/COLONY COUNT: CPT | Performed by: FAMILY MEDICINE

## 2021-10-19 NOTE — PROGRESS NOTES
ANTICOAGULATION MANAGEMENT     Buck Sinclair 76 year old male is on warfarin with therapeutic INR result. (Goal INR 2.0-3.0)    No results for input(s): INR in the last 168 hours.    ASSESSMENT     Source(s): Chart Review and Patient/Caregiver Call       Warfarin doses taken: Warfarin taken as instructed    Diet: No new diet changes identified    New illness, injury, or hospitalization: No    Medication/supplement changes: None noted    Signs or symptoms of bleeding or clotting: No    Previous INR: Therapeutic last 2(+) visits    Additional findings: Quarterly outreach to patient per home monitor protocol     PLAN     Recommended plan for no diet, medication or health factor changes affecting INR     Dosing Instructions: Continue your current warfarin dose with next INR in 1- 2 weeks       Summary  As of 10/19/2021    Full warfarin instructions:  5 mg every Mon, Thu; 2.5 mg all other days   Next INR check:  11/2/2021             Telephone call with  patient's spouse Neela who verbalizes understanding and agrees to plan    Patient to recheck with home meter    Education provided: Importance of notifying clinic for changes in medications; a sooner lab recheck maybe needed., Importance of notifying clinic of upcoming surgeries and procedures 2 weeks in advance and Contact [phone] with any changes, questions or concerns.     Plan made per [facility] anticoagulation protocol    Gisele Prince RN  Anticoagulation Clinic  10/19/2021    _______________________________________________________________________     Anticoagulation Episode Summary     Current INR goal:  2.0-3.0   TTR:  88.7 % (1 y)   Target end date:     Send INR reminders to:  [facility] HEART [facility]    Indications    Persistent Atrial Fibrillation [I48.91]           Comments:  home monitor talk to wife about dose Neela   [phone]         Anticoagulation Care Providers     Provider Role Specialty Phone number    Erica Hansen APRN CNP   Family Medicine [phone]    Everett Renee MD  Cardiovascular Disease [phone]

## 2021-10-19 NOTE — LETTER
ANTICOAGULATION MANAGEMENT      Buck GOLDMAN Yahir due for annual renewal of referral to anticoagulation monitoring. Order pended for your review and signature.      ANTICOAGULATION SUMMARY      Warfarin indication(s)     {accindications2:023143}    Heart valve present?  {accvalve:660273}       Current goal range   {INRrange2:232691}     Goal appropriate for indication? {ACCgoalassess:140395}     Current duration of therapy {anticoag duration of therapy:577175}   Time in Therapeutic Range (TTR)  (Goal > 60%) ***%       Office visit with referring provider's group within last year {YES/NO:829214} on ***       Gisele Prince RN

## 2021-10-20 PROBLEM — I48.19 PERSISTENT ATRIAL FIBRILLATION (H): Status: ACTIVE | Noted: 2021-10-20

## 2021-10-20 NOTE — PROGRESS NOTES
ANTICOAGULATION MANAGEMENT      Buck Sinclair due for annual renewal of referral to anticoagulation monitoring. Order pended for your review and signature.      ANTICOAGULATION SUMMARY      Warfarin indication(s)     Atrial fibrillation    Heart valve present?  NO       Current goal range   INR: 2.0-3.0     Goal appropriate for indication? Yes, INR 2-3 appropriate for hx of DVT, PE, hypercoagulable state, Afib, LVAD, or bileaflet AVR without risk factors     Current duration of therapy Indefinite/long term therapy   Time in Therapeutic Range (TTR)  (Goal > 60%) 89%       Office visit with referring provider's group within last year yes on 5/11/2021       Gisele Prince RN

## 2021-10-21 LAB — BACTERIA UR CULT: NO GROWTH

## 2021-10-26 ENCOUNTER — DOCUMENTATION ONLY (OUTPATIENT)
Dept: ANTICOAGULATION | Facility: CLINIC | Age: 76
End: 2021-10-26

## 2021-10-26 ENCOUNTER — TRANSFERRED RECORDS (OUTPATIENT)
Dept: HEALTH INFORMATION MANAGEMENT | Facility: CLINIC | Age: 76
End: 2021-10-26
Payer: COMMERCIAL

## 2021-10-26 DIAGNOSIS — I48.19 PERSISTENT ATRIAL FIBRILLATION (H): ICD-10-CM

## 2021-10-26 DIAGNOSIS — I48.91 ATRIAL FIBRILLATION (H): Primary | ICD-10-CM

## 2021-10-26 LAB — INR (EXTERNAL): 2.9 (ref 0.9–1.1)

## 2021-10-26 NOTE — PROGRESS NOTES
ANTICOAGULATION  MANAGEMENT-Home Monitor Managed by Exception    Buck Sinclair 76 year old male is on warfarin with therapeutic INR result. (Goal INR 2.0-3.0)    Recent labs: (last 7 days)     10/26/21  0000   INR 2.9*         Previous INR was Therapeutic    Medication, diet, health changes since last INR:chart reviewed; none idientified    Contacted within the last 12 weeks by phone on 10/19      LUCAS Jane was NOT contacted regarding therapeutic result today per home monitoring policy manage by exception agreement.   Current warfarin dose is to be continued:     Summary  As of 10/26/2021    Full warfarin instructions:  5 mg every Mon, Thu; 2.5 mg all other days   Next INR check:  11/2/2021           ?   Gloria Phillips RN  Anticoagulation Clinic  10/26/2021    _______________________________________________________________________     Anticoagulation Episode Summary     Current INR goal:  2.0-3.0   TTR:  88.7 % (1 y)   Target end date:  Indefinite   Send INR reminders to:  [facility] HEART [facility]    Indications    Persistent Atrial Fibrillation [I48.91]  Persistent atrial fibrillation (H) [I48.19]           Comments:  home monitor talk to wife about dose Neela   [phone]         Anticoagulation Care Providers     Provider Role Specialty Phone number    Erica Hansen APRN [facility]  Family Medicine [phone]    Everett Renee MD  Cardiovascular Disease [phone]

## 2021-11-02 ENCOUNTER — DOCUMENTATION ONLY (OUTPATIENT)
Dept: ANTICOAGULATION | Facility: CLINIC | Age: 76
End: 2021-11-02

## 2021-11-02 ENCOUNTER — TRANSFERRED RECORDS (OUTPATIENT)
Dept: HEALTH INFORMATION MANAGEMENT | Facility: CLINIC | Age: 76
End: 2021-11-02
Payer: COMMERCIAL

## 2021-11-02 DIAGNOSIS — I48.91 ATRIAL FIBRILLATION (H): Primary | ICD-10-CM

## 2021-11-02 DIAGNOSIS — I48.19 PERSISTENT ATRIAL FIBRILLATION (H): ICD-10-CM

## 2021-11-02 LAB — INR (EXTERNAL): 2.4 (ref 0.1–1.1)

## 2021-11-02 NOTE — PROGRESS NOTES
ANTICOAGULATION  MANAGEMENT-Home Monitor Managed by Exception    Buck Sinclair 76 year old male is on warfarin with therapeutic INR result. (Goal INR 2.0-3.0)    Recent labs: (last 7 days)     11/02/21  0000   INR 2.4*         Previous INR was Therapeutic    Medication, diet, health changes since last INR:chart reviewed; none idientified    Contacted within the last 12 weeks by phone on 10/19/21      LUCAS Jane was NOT contacted regarding therapeutic result today per home monitoring policy manage by exception agreement.   Current warfarin dose is to be continued:     Summary  As of 11/2/2021    Full warfarin instructions:  5 mg every Mon, Thu; 2.5 mg all other days   Next INR check:  11/16/2021           ?   Cole Mohan RN  Anticoagulation Clinic  11/2/2021    _______________________________________________________________________     Anticoagulation Episode Summary     Current INR goal:  2.0-3.0   TTR:  88.7 % (1 y)   Target end date:  Indefinite   Send INR reminders to:  [facility]    Indications    Persistent Atrial Fibrillation [I48.91]  Persistent atrial fibrillation (H) [I48.19]           Comments:  home monitor talk to wife about dose Neela   [phone]         Anticoagulation Care Providers     Provider Role Specialty Phone number    Erica Hansen APRN [facility]  Family Medicine [phone]    Everett Renee MD  Cardiovascular Disease [phone]

## 2021-11-09 ENCOUNTER — TRANSFERRED RECORDS (OUTPATIENT)
Dept: HEALTH INFORMATION MANAGEMENT | Facility: CLINIC | Age: 76
End: 2021-11-09
Payer: COMMERCIAL

## 2021-11-09 ENCOUNTER — DOCUMENTATION ONLY (OUTPATIENT)
Dept: ANTICOAGULATION | Facility: CLINIC | Age: 76
End: 2021-11-09
Payer: COMMERCIAL

## 2021-11-09 DIAGNOSIS — I48.91 ATRIAL FIBRILLATION (H): Primary | ICD-10-CM

## 2021-11-09 DIAGNOSIS — I48.19 PERSISTENT ATRIAL FIBRILLATION (H): ICD-10-CM

## 2021-11-09 LAB — INR (EXTERNAL): 2.2 (ref 0.9–1.1)

## 2021-11-09 RX ORDER — ERGOCALCIFEROL 1.25 MG/1
50000 CAPSULE, LIQUID FILLED ORAL
COMMUNITY
Start: 2021-07-08

## 2021-11-09 RX ORDER — POLYETHYLENE GLYCOL 3350 17 G/17G
17 POWDER, FOR SOLUTION ORAL DAILY
COMMUNITY

## 2021-11-09 RX ORDER — FLUOXETINE 10 MG/1
1 CAPSULE ORAL DAILY
COMMUNITY
Start: 2021-09-09 | End: 2021-11-12

## 2021-11-09 NOTE — PROGRESS NOTES
ANTICOAGULATION  MANAGEMENT-Home Monitor Managed by Exception    Buck Sinclair 76 year old male is on warfarin with therapeutic INR result. (Goal INR 2.0-3.0)    Recent labs: (last 7 days)     11/09/21  0626   INR 2.2*         Previous INR was Therapeutic    Medication, diet, health changes since last INR:chart reviewed; none identified    Contacted within the last 12 weeks by phone on 10/19/21      LUCAS Jane was NOT contacted regarding therapeutic result today per home monitoring policy manage by exception agreement.   Current warfarin dose is to be continued:     Summary  As of 11/9/2021    Full warfarin instructions:  5 mg every Mon, Thu; 2.5 mg all other days   Next INR check:  11/16/2021           ?   Fatoumata Ochoa, RN  Anticoagulation Clinic  11/9/2021    _______________________________________________________________________     Anticoagulation Episode Summary     Current INR goal:  2.0-3.0   TTR:  88.7 % (1 y)   Target end date:  Indefinite   Send INR reminders to:  [facility]    Indications    Persistent Atrial Fibrillation [I48.91]  Persistent atrial fibrillation (H) [I48.19]           Comments:  home monitor talk to wife about dose Neela   [phone]         Anticoagulation Care Providers     Provider Role Specialty Phone number    Erica Hansen APRN [facility]  Family Medicine [phone]    Everett Renee MD  Cardiovascular Disease [phone]

## 2021-11-10 ENCOUNTER — TELEPHONE (OUTPATIENT)
Dept: CARDIOLOGY | Facility: CLINIC | Age: 76
End: 2021-11-10
Payer: COMMERCIAL

## 2021-11-10 NOTE — TELEPHONE ENCOUNTER
Spoke with pharmacist, she stated the medication is processed through [facility].  Pharmacy handles WC cases.  Pharmacist stated they are waiting to hear back and will notify the patient.

## 2021-11-10 NOTE — TELEPHONE ENCOUNTER
PA for Lasix   Pt has work comp. work comp  [phone] for meds PA vs insurance.  Cover My meds  [id number]

## 2021-11-10 NOTE — TELEPHONE ENCOUNTER
Central Prior Authorization Team   Phone: [phone]    PA Initiation    Medication:   Insurance Company:    Pharmacy Filling the Rx: [id number] [place], [place] - [facility] AT [facility] [address]  Filling Pharmacy Phone: [phone]  Filling Pharmacy Fax: [phone]  Start Date: 11/10/2021

## 2021-11-12 ENCOUNTER — OFFICE VISIT (OUTPATIENT)
Dept: PULMONOLOGY | Facility: OTHER | Age: 76
End: 2021-11-12
Payer: OTHER MISCELLANEOUS

## 2021-11-12 ENCOUNTER — DOCUMENTATION ONLY (OUTPATIENT)
Dept: PULMONOLOGY | Facility: OTHER | Age: 76
End: 2021-11-12

## 2021-11-12 VITALS
HEART RATE: 74 BPM | WEIGHT: 283.1 LBS | OXYGEN SATURATION: 100 % | DIASTOLIC BLOOD PRESSURE: 62 MMHG | SYSTOLIC BLOOD PRESSURE: 104 MMHG | BODY MASS INDEX: 35.39 KG/M2

## 2021-11-12 DIAGNOSIS — I50.9 CONGESTIVE HEART FAILURE, UNSPECIFIED HF CHRONICITY, UNSPECIFIED HEART FAILURE TYPE (H): Primary | ICD-10-CM

## 2021-11-12 DIAGNOSIS — Z99.81 SUPPLEMENTAL OXYGEN DEPENDENT: ICD-10-CM

## 2021-11-12 DIAGNOSIS — J44.9 CHRONIC OBSTRUCTIVE PULMONARY DISEASE, UNSPECIFIED COPD TYPE (H): ICD-10-CM

## 2021-11-12 DIAGNOSIS — I42.9 CARDIOMYOPATHY, UNSPECIFIED TYPE (H): ICD-10-CM

## 2021-11-12 DIAGNOSIS — J96.21 ACUTE ON CHRONIC RESPIRATORY FAILURE WITH HYPOXEMIA (H): ICD-10-CM

## 2021-11-12 PROCEDURE — 99214 OFFICE O/P EST MOD 30 MIN: CPT | Performed by: INTERNAL MEDICINE

## 2021-11-12 RX ORDER — FUROSEMIDE 40 MG
80 TABLET ORAL DAILY
Qty: 90 TABLET | Refills: 3 | Status: SHIPPED | OUTPATIENT
Start: 2021-11-12 | End: 2021-12-17

## 2021-11-12 NOTE — NURSING NOTE
Oxygen saturation walk test       Oxygen continuous dose testing  While ambulating 150ft on 6LPM continuous dose, oxygen saturation is 82%.  (we don't have tanks that go up more)      DME Provider: [employer]    Patient is ambulatory within his/her home.

## 2021-11-12 NOTE — PROGRESS NOTES
DME Provider: Isidra  Date Faxed: 11/12/21  Ordering Provider: Dr. Payton  Equipment ordered: oxygen increased

## 2021-11-12 NOTE — PROGRESS NOTES
Pulmonary Clinic Follow-up Visit    Assessment/Plan:  72 year old male with a history of tobacco dependence in remission, CAD s/p PCI/stents, afib s/p PVL with ICD/PPM on anticoagulation, history of cavitary lesion and extensive pneumonia in LLL in Oct 2017, subsequent recurrent LLL pneumonia, since resolved, presenting for follow up. Repeat PFT's in 2020 actually showed improved FEV1/FVC ratio, stable FEV1 but substantial worsening of the DLco (almost 40% lower than in 2018).    Today he is markedly dyspneic, edematous, in atrial fibrillation (although not rapid afib). His SpO2 dropped to 80% with walking. I believe he is in congestive heart failure. Doubt PE since he's anticoagulated on warfarin with therapeutic INR.   I strongly recommended hospital admission for IV diuresis and further workup. However we did discuss that there are no hospital beds available so he might have to wait in the ER for a long time. He declined.  Will increase his Lasix to 80mg daily, get a repeat echo and blood work in a week and I asked him to see his cardiologist ASAP.   He will also need a new order for continuous oxygen as the pulse dose is no longer effective.      Plan:  #COPD, GOLD class B (CAT >10, few exacerbations/low risk for hospitalization). Minimal smoking history so this is probably due to his work as a . PFTs 2017 showed mild obstruction and normal DLco. Mild exertional hypoxemia noted on 6MWT in 2019. Now on supplemental oxygen as of 2020.  - continue budesonide-formoterol 160-4.5 mcg two inhalations BID with spacer; rinse/gargle/spit water after use. No dose changes today.  - continue tiotropium HandiHaler one inhalation daily  - Cont albuterol as needed  - encouraged exercise, weight loss as able  - continue supplemental O2 for goal O2 sat 88-92%   Due to desaturation of SpO2 less than or equal to 88% on room air at rest from COPD, home oxygen therapy will benefit my patient's condition. The patient has  tried other medications including inhaled and nebulized therapy and steroids with limited success and oxygen is still required. The patient is mobile in the home and requires portability.  - did not have time to address pulmonary rehab today.  - no indication for LDCT as his smoking history is too minimal.   - UTD w/ pneumonia, flu and covid-19 vaccines.     #exertional hypoxemia, worsening DLco, significant LIMA: suspicion for PVD/pulm HTN since his LIMA seems out of proportion to PFT abnormalities from 2018. Likely due to extensive left-sided heart disease and possible valvular disease as well as hypoxemic lung disease (emphysema). Significant worsening of DLco over the last 2 years. Last echo 2019 did not show markedly abnormal PA pressures but was a limited study. He did have borderline reduced EF of 45%. heart disease stable per patient.   - echocardiogram  - increase Lasix to 80mg daily  - BMP, BNP blood work next week  - declines hospital admission or ER evaluation. Told him to go to the ER if he has worsening symptoms.      #LLL nodule: decreasing in size on 3 month f/u scan after abx (last scan 2019). 9mm -> 7mm and associated with scarring  - no further imaging needed unless recurrent symptoms     #Hemoptysis in the past: no further recurrence. Likely was due to the prior LLL pneumonia in the setting of anticoagulation.  - continue INR goal close to 2, as discussed with Dr. Renee   - he'll let me know if he has any concerning symptoms.    #nocturnal hypoxemia:  - needs sleep study. Did not have time to address this today.     Follow up in 1 month for reassessment.    CCx: follow up of hemoptysis, and COPD GOLD class B    HPI: Interim history: I last saw Buck on 5/10. Since that time, he had an overnight oximetry test which showed some nocturnal desaturations.    Today he reports increasing SOB. O2 sat dipping into 70s at home. Pulse dose not working for him  Increased leg edema.  Thinks his HR is  fluctuating, in and out of afib.      ROS:  A 12-system review was obtained and was negative with the exception of the symptoms endorsed in the history of present illness.    PMH:  Past Medical History:   Diagnosis Date     Anemia      Asthma without status asthmaticus 5/5/2021     BPH (benign prostatic hyperplasia)      COPD, group B, by GOLD 2017 classification (H)      Coronary artery disease due to calcified coronary lesion      Dyslipidemia, goal LDL below 70      Essential hypertension      History of cardiomyopathy      History of transfusion      Persistent atrial fibrillation (H)      Pneumonia of left lower lobe due to infectious organism 10/4/2017     Skin cancer of trunk      Status post catheter ablation of atrial fibrillation 6/7/2017    PVI 4-2011 (Cryo/PVI + roof line + CTI line) Re-do PVI 7-2011 (RFA/PVI + CFE + VIDYA + confirmed CTI line)     Ventricular tachycardia (H)        PSH:  Past Surgical History:   Procedure Laterality Date     CARDIAC DEFIBRILLATOR PLACEMENT       CARDIOVERSION  07/11/2018    x20, last 2/12/15, 10/2015, 11/18/16, 6/16/17 by Lauren Foster CNP     CARDIOVERSION  07/11/2018     COLONOSCOPY N/A 4/28/2017    Procedure: COLONOSCOPY with 2 ascending polyps and 1 transverse polyp;  Surgeon: Jose Whittington MD;  Location: [facility];  Service:      CV CORONARY ANGIOGRAM N/A 9/20/2017    Procedure: Coronary Angiogram;  Surgeon: Sergio Cervantes MD;  Location: [facility];  Service:      EP ICD INSERT       FRACTURE SURGERY Left     wrist     INGUINAL HERNIA REPAIR Left 1967    while in the [employer] in [place] after 13 month in [place]     INSERT / REPLACE / REMOVE PACEMAKER       left hand surgery---tendon repair       [facility] ABLATE HEART DYSRHYTHM FOCUS  04/2011    Catheter Ablation Atrial Fibrillation PVI Apr 2011 (Cryo+RF-PVI + roof line + CTI line)     NH ABLATE HEART DYSRHYTHM FOCUS  07/2011    Re-do PVI Jul 2011 (RFA-PVI + CFE + VIDYA + confirmation of CTI line)     [facility] MYERS  W/O FACETEC FORAMOT/DSKC  VRT SEG, CERVICAL      Laminectomy Lumbar;  Recorded: 2012;     TOTAL SHOULDER REPLACEMENT Right 2016    Dr. Abernathy of [facility]       Allergies:  Allergies   Allergen Reactions     Adhesive Other (See Comments)     ADHESIVE TAPE; SKIN IRRITATION  Foam tape:  Skin removed with tape removal     Amiodarone      ADVERSE REACTION.  Sunlight sensitivity.     Lisinopril Cough       Family HX:  Family History   Problem Relation Age of Onset     Cancer Mother         leukemia     Cancer Father         bladder     Cancer Sister         breast with lung met.     Aneurysm Sister      CABG Brother      CABG Brother      Valvular heart disease Brother         valve replacement       Social Hx:  Social History     Socioeconomic History     Marital status:      Spouse name: Neela     Number of children: Not on file     Years of education: 12     Highest education level: Not on file   Occupational History     Occupation:      Employer: RETIRED     Occupation: [employer]     Comment: [employer]   Social Needs     Financial resource strain: Not on file     Food insecurity     Worry: Not on file     Inability: Not on file     Transportation needs     Medical: Not on file     Non-medical: Not on file   Tobacco Use     Smoking status: Former Smoker     Packs/day: 1.00     Years: 4.00     Pack years: 4.00     Types: Cigarettes     Quit date: 1968     Years since quittin.3     Smokeless tobacco: Never Used   Substance and Sexual Activity     Alcohol use: Yes     Alcohol/week: 2.0 standard drinks     Types: 2 Cans of beer per week     Comment: 1 beer per week     Drug use: No     Sexual activity: Yes     Partners: Female     Birth control/protection: Post-menopausal   Lifestyle     Physical activity     Days per week: Not on file     Minutes per session: Not on file     Stress: Not on file   Relationships     Social connections     Talks on phone: Not on  file     Gets together: Not on file     Attends Denominational service: Not on file     Active member of club or organization: Not on file     Attends meetings of clubs or organizations: Not on file     Relationship status: Not on file     Intimate partner violence     Fear of current or ex partner: Not on file     Emotionally abused: Not on file     Physically abused: Not on file     Forced sexual activity: Not on file   Other Topics Concern     Not on file   Social History Narrative     Not on file       Current Meds:  Current Outpatient Medications   Medication Sig Dispense Refill     ACETAMINOPHEN (TYLENOL ORAL) Take 1,000 mg by mouth 2 (two) times a day.        albuterol (PROAIR HFA;PROVENTIL HFA;VENTOLIN HFA) 90 mcg/actuation inhaler Inhale 2 puffs every 6 (six) hours as needed for wheezing. 1 Inhaler 11     amoxicillin (AMOXIL) 500 MG tablet Take prior to the Dentist       ascorbic acid (VITAMIN C) 1000 MG tablet Take 1,000 mg by mouth daily.       atorvastatin (LIPITOR) 40 MG tablet Take 40 mg by mouth at bedtime.       budesonide-formoterol (SYMBICORT) 160-4.5 mcg/actuation inhaler Inhale 2 puffs 2 (two) times a day.       co-enzyme Q-10 30 mg capsule Take 100 mg by mouth daily.       furosemide (LASIX) 40 MG tablet Take 1 tablet (40 mg total) by mouth daily. 90 tablet 3     losartan (COZAAR) 50 MG tablet Take 1 tablet (50 mg total) by mouth daily. 90 tablet 1     MAG 64 64 mg TbEC delayed-release tablet TAKE 1 TABLET(64 MG) BY MOUTH TWICE DAILY 180 tablet 1     multivitamin (MULTIVITAMIN) per tablet Take 1 tablet by mouth daily.       omega 3-dha-epa-fish oil (FISH OIL) 1,000 mg (120 mg-180 mg) cap Take 2 tablets by mouth 2 (two) times a day.        OXYGEN-AIR DELIVERY SYSTEMS [facility] Use As Directed. 2 L at rest/night and 3-4L with activity  [employer]       polyethylene glycol (MIRALAX) 17 gram packet Take 17 g by mouth daily.       sotaloL (BETAPACE) 80 MG tablet TAKE 2 TABLETS(160 MG) BY MOUTH TWICE DAILY. 360  tablet 0     tiotropium (SPIRIVA) 18 mcg inhalation capsule Place 18 mcg into inhaler and inhale daily.       VITAMIN D2 50,000 unit capsule Take 50,000 Units by mouth 2 (two) times a week. SUN and WED  3     warfarin ANTICOAGULANT (COUMADIN/JANTOVEN) 2.5 MG tablet Takes 1 tablet (2.5mg) to 2 tablets (5mg) by mouth daily, as directed.  Adjust dose based on INR results. 130 tablet 1     No current facility-administered medications for this visit.        Physical Exam:  /62   Pulse 74   Wt 128.4 kg (283 lb 1.6 oz)   SpO2 100%   BMI 35.39 kg/m    Gen: alert, oriented, no distress  HEENT: nasal turbinates are unremarkable, no oropharyngeal lesions, no cervical or supraclavicular lymphadenopathy  CV: irreg irreg, no M/G/R  Resp: clear lungs. 2-3+ pitting edema bilateral legs  Abd: soft, nontender, no palpable organomegaly  Skin: no apparent rashes  Ext: no cyanosis, clubbing or edema  Neuro: alert, nonfocal    Labs:  Reviewed  Dec 2018  Chem panel wnl  TSH wnl  hgb 12.1 Oct 2018    Previous testing  DONNA neg  blasto neg  Histo testing neg  c-ANCA neg  HIV neg  RF neg    Bronch/LLL BAL cultures neg      Imaging studies:  CT chest April 2018  IMPRESSION:   CONCLUSION:  1.  Mild scarring and slight bronchiectasis present at both lung bases. No active pneumonia identified.    CT chest 7/15/2019  IMPRESSION:   CONCLUSION:   1.  Nodular opacity of the left lower lobe of interest on 04/17/2019 is less conspicuous today and probably explained by scarring. Additional 6 month chest CT follow-up is recommended.  2.  Emphysema and scattered areas of scarring elsewhere in both lungs.  3.  Advanced multivessel coronary artery disease.      Left Ventricle: Normal left ventricular size.The estimated left ventricular ejection fraction is 45%.Mild concentric hypertrophy noted. Normal septal motion. Unable to assess left ventricular diastolic function given patient is in atrial fibrillation.    Wall Scoring: Resting Even with  Definity contrast, left ventricular wall motion is difficult to evaluate. Suspicious of inferior basilar hypokinesis on top of mild global hypokinesis.    Right Ventricle: Normal right ventricular size and systolic function. TAPSE is normal, which is consistent with normal right ventricular systolic function. Pacer lead is present in the ventricle.    Left Atrium: Left atrial volume is severely increased.    Tricuspid Valve: Tricuspid valve not well visualized. There is no evidence of tricuspid stenosis. Mild to moderate tricuspid valve regurgitation. The TR peak velocity is over estimated. Right ventricular systolic pressure is likely within normal limits.     Compared to echocardiogram from August 29, 2018, the findings are similar but both echoes are technically challenging.  There is more suspicion of more prominent inferior basilar hypokinesis on the current study.  The TR jet that evaluated the right ventricular systolic pressure, is contaminated and suspect relatively normal right ventricular systolic pressure estimate.    PFTs 2018  FEV1/FVC is 0.56 and is reduced.  FEV1 is 73% predicted and is reduced.  FVC is 96% predicted and normal.  There was no improvement in spirometry after a single inhaled dose of bronchodilator.  TLC is 99% predicted and is normal.  RV is 113% predicted and is normal.  DLCO is 93% predicted and is normal when it   is corrected for hemoglobin.     Impression:  Full Pulmonary Function Test is abnormal.  PFTs are consistent with mild obstructive disease.  Spirometry is not consistent with reversibility.  There is no hyperinflation.  There is no air-trapping.  Diffusion capacity when corrected for hemoglobin is normal.  Declines in both FEV1 and TLC since 2009 with overall preservation of diffusing capacity.    PFTs 11/10/2029  FEV1 2.42L 68%  FVC 76%  No BD response  Ratio 0.66 (LLN 0.6)  DLco 46% han for hgb  Flow vol curve suggests obstruction.  Impression: no obstruction based on  ratio >LLN but FV curve does suggest some obstruction. Significant decline in DLco compared to DLco.     July 2019  SIX MINUTE WALK TEST / HOME O2 EVALUATION     SpO2 at rest on RA 96%  SpO2 started to drop after 2 1/2 minutes walking. SpO2 after walking 2 1/2 minutes on RA was 88%  SpO2 after walking 6 minutes on RA was 87%  Distance covered 320.04 meters.  Recovery phase, SpO2 after 1 minute rest on RA was 87%  Recovery phase, SpO2 after 2 minute rest on RA was 97%     Impression:   Significant desaturation with activities.   Patient does qualify for O2 supplementation with activity.    Hugh Payton MD (Avi)  [facility]monary [facility]  Pager [phone]  Clinic [phone]

## 2021-11-12 NOTE — LETTER
11/12/2021         RE: Buck Sinclair  [address] [place] [address]        Dear Colleague,    Thank you for referring your patient, Buck Sinclair, to the [facility]. Please see a copy of my visit note below.    Pulmonary Clinic Follow-up Visit    Assessment/Plan:  72 year old male with a history of tobacco dependence in remission, CAD s/p PCI/stents, afib s/p PVL with ICD/PPM on anticoagulation, history of cavitary lesion and extensive pneumonia in LLL in Oct 2017, subsequent recurrent LLL pneumonia, since resolved, presenting for follow up. Repeat PFT's in 2020 actually showed improved FEV1/FVC ratio, stable FEV1 but substantial worsening of the DLco (almost 40% lower than in 2018).    Today he is markedly dyspneic, edematous, in atrial fibrillation (although not rapid afib). His SpO2 dropped to 80% with walking. I believe he is in congestive heart failure. Doubt PE since he's anticoagulated on warfarin with therapeutic INR.   I strongly recommended hospital admission for IV diuresis and further workup. However we did discuss that there are no hospital beds available so he might have to wait in the ER for a long time. He declined.  Will increase his Lasix to 80mg daily, get a repeat echo and blood work in a week and I asked him to see his cardiologist ASAP.   He will also need a new order for continuous oxygen as the pulse dose is no longer effective.      Plan:  #COPD, GOLD class B (CAT >10, few exacerbations/low risk for hospitalization). Minimal smoking history so this is probably due to his work as a . PFTs 2017 showed mild obstruction and normal DLco. Mild exertional hypoxemia noted on 6MWT in 2019. Now on supplemental oxygen as of 2020.  - continue budesonide-formoterol 160-4.5 mcg two inhalations BID with spacer; rinse/gargle/spit water after use. No dose changes today.  - continue tiotropium HandiHaler one inhalation daily  - Cont albuterol as  needed  - encouraged exercise, weight loss as able  - continue supplemental O2 for goal O2 sat 88-92%   Due to desaturation of SpO2 less than or equal to 88% on room air at rest from COPD, home oxygen therapy will benefit my patient's condition. The patient has tried other medications including inhaled and nebulized therapy and steroids with limited success and oxygen is still required. The patient is mobile in the home and requires portability.  - did not have time to address pulmonary rehab today.  - no indication for LDCT as his smoking history is too minimal.   - UTD w/ pneumonia, flu and covid-19 vaccines.     #exertional hypoxemia, worsening DLco, significant LIMA: suspicion for PVD/pulm HTN since his LIMA seems out of proportion to PFT abnormalities from 2018. Likely due to extensive left-sided heart disease and possible valvular disease as well as hypoxemic lung disease (emphysema). Significant worsening of DLco over the last 2 years. Last echo 2019 did not show markedly abnormal PA pressures but was a limited study. He did have borderline reduced EF of 45%. heart disease stable per patient.   - echocardiogram  - increase Lasix to 80mg daily  - BMP, BNP blood work next week  - declines hospital admission or ER evaluation. Told him to go to the ER if he has worsening symptoms.      #LLL nodule: decreasing in size on 3 month f/u scan after abx (last scan 2019). 9mm -> 7mm and associated with scarring  - no further imaging needed unless recurrent symptoms     #Hemoptysis in the past: no further recurrence. Likely was due to the prior LLL pneumonia in the setting of anticoagulation.  - continue INR goal close to 2, as discussed with Dr. Renee   - he'll let me know if he has any concerning symptoms.    #nocturnal hypoxemia:  - needs sleep study. Did not have time to address this today.     Follow up in 1 month for reassessment.    CCx: follow up of hemoptysis, and COPD GOLD class B    HPI: Interim history: I last  saw Buck on 5/10. Since that time, he had an overnight oximetry test which showed some nocturnal desaturations.    Today he reports increasing SOB. O2 sat dipping into 70s at home. Pulse dose not working for him  Increased leg edema.  Thinks his HR is fluctuating, in and out of afib.      ROS:  A 12-system review was obtained and was negative with the exception of the symptoms endorsed in the history of present illness.    PMH:  Past Medical History:   Diagnosis Date     Anemia      Asthma without status asthmaticus 5/5/2021     BPH (benign prostatic hyperplasia)      COPD, group B, by GOLD 2017 classification (H)      Coronary artery disease due to calcified coronary lesion      Dyslipidemia, goal LDL below 70      Essential hypertension      History of cardiomyopathy      History of transfusion      Persistent atrial fibrillation (H)      Pneumonia of left lower lobe due to infectious organism 10/4/2017     Skin cancer of trunk      Status post catheter ablation of atrial fibrillation 6/7/2017    PVI 4-2011 (Cryo/PVI + roof line + CTI line) Re-do PVI 7-2011 (RFA/PVI + CFE + VIDYA + confirmed CTI line)     Ventricular tachycardia (H)        PSH:  Past Surgical History:   Procedure Laterality Date     CARDIAC DEFIBRILLATOR PLACEMENT       CARDIOVERSION  07/11/2018    x20, last 2/12/15, 10/2015, 11/18/16, 6/16/17 by Lauren Foster CNP     CARDIOVERSION  07/11/2018     COLONOSCOPY N/A 4/28/2017    Procedure: COLONOSCOPY with 2 ascending polyps and 1 transverse polyp;  Surgeon: Jose Whittington MD;  Location: [facility] GI;  Service:      CV CORONARY ANGIOGRAM N/A 9/20/2017    Procedure: Coronary Angiogram;  Surgeon: Sergio Cervantes MD;  Location: [facility] Lab;  Service:      EP ICD INSERT       FRACTURE SURGERY Left     wrist     INGUINAL HERNIA REPAIR Left 1967    while in the [employer] in [place] after 13 month in [place]     INSERT / REPLACE / REMOVE PACEMAKER       left hand surgery---tendon repair       HI  ABLATE HEART DYSRHYTHM FOCUS  2011    Catheter Ablation Atrial Fibrillation PVI 2011 (Cryo+RF-PVI + roof line + CTI line)     HI ABLATE HEART DYSRHYTHM FOCUS  2011    Re-do PVI 2011 (RFA-PVI + CFE + VIDYA + confirmation of CTI line)     HI MYERS W/O FACETEC FORAMOT/DSKC 1/ VRT SEG, CERVICAL      Laminectomy Lumbar;  Recorded: 2012;     TOTAL SHOULDER REPLACEMENT Right 2016    Dr. Abernathy of [facility]       Allergies:  Allergies   Allergen Reactions     Adhesive Other (See Comments)     ADHESIVE TAPE; SKIN IRRITATION  Foam tape:  Skin removed with tape removal     Amiodarone      ADVERSE REACTION.  Sunlight sensitivity.     Lisinopril Cough       Family HX:  Family History   Problem Relation Age of Onset     Cancer Mother         leukemia     Cancer Father         bladder     Cancer Sister         breast with lung met.     Aneurysm Sister      CABG Brother      CABG Brother      Valvular heart disease Brother         valve replacement       Social Hx:  Social History     Socioeconomic History     Marital status:      Spouse name: Neela     Number of children: Not on file     Years of education: 12     Highest education level: Not on file   Occupational History     Occupation:      Employer: RETIRED     Occupation: [employer]     Comment: [employer]   Social Needs     Financial resource strain: Not on file     Food insecurity     Worry: Not on file     Inability: Not on file     Transportation needs     Medical: Not on file     Non-medical: Not on file   Tobacco Use     Smoking status: Former Smoker     Packs/day: 1.00     Years: 4.00     Pack years: 4.00     Types: Cigarettes     Quit date: 1968     Years since quittin.3     Smokeless tobacco: Never Used   Substance and Sexual Activity     Alcohol use: Yes     Alcohol/week: 2.0 standard drinks     Types: 2 Cans of beer per week     Comment: 1 beer per week     Drug use: No     Sexual activity: Yes      Partners: Female     Birth control/protection: Post-menopausal   Lifestyle     Physical activity     Days per week: Not on file     Minutes per session: Not on file     Stress: Not on file   Relationships     Social connections     Talks on phone: Not on file     Gets together: Not on file     Attends Holiness service: Not on file     Active member of club or organization: Not on file     Attends meetings of clubs or organizations: Not on file     Relationship status: Not on file     Intimate partner violence     Fear of current or ex partner: Not on file     Emotionally abused: Not on file     Physically abused: Not on file     Forced sexual activity: Not on file   Other Topics Concern     Not on file   Social History Narrative     Not on file       Current Meds:  Current Outpatient Medications   Medication Sig Dispense Refill     ACETAMINOPHEN (TYLENOL ORAL) Take 1,000 mg by mouth 2 (two) times a day.        albuterol (PROAIR HFA;PROVENTIL HFA;VENTOLIN HFA) 90 mcg/actuation inhaler Inhale 2 puffs every 6 (six) hours as needed for wheezing. 1 Inhaler 11     amoxicillin (AMOXIL) 500 MG tablet Take prior to the Dentist       ascorbic acid (VITAMIN C) 1000 MG tablet Take 1,000 mg by mouth daily.       atorvastatin (LIPITOR) 40 MG tablet Take 40 mg by mouth at bedtime.       budesonide-formoterol (SYMBICORT) 160-4.5 mcg/actuation inhaler Inhale 2 puffs 2 (two) times a day.       co-enzyme Q-10 30 mg capsule Take 100 mg by mouth daily.       furosemide (LASIX) 40 MG tablet Take 1 tablet (40 mg total) by mouth daily. 90 tablet 3     losartan (COZAAR) 50 MG tablet Take 1 tablet (50 mg total) by mouth daily. 90 tablet 1     MAG 64 64 mg TbEC delayed-release tablet TAKE 1 TABLET(64 MG) BY MOUTH TWICE DAILY 180 tablet 1     multivitamin (MULTIVITAMIN) per tablet Take 1 tablet by mouth daily.       omega 3-dha-epa-fish oil (FISH OIL) 1,000 mg (120 mg-180 mg) cap Take 2 tablets by mouth 2 (two) times a day.        OXYGEN-AIR  DELIVERY SYSTEMS [facility] Use As Directed. 2 L at rest/night and 3-4L with activity  [facility]       polyethylene glycol (MIRALAX) 17 gram packet Take 17 g by mouth daily.       sotaloL (BETAPACE) 80 MG tablet TAKE 2 TABLETS(160 MG) BY MOUTH TWICE DAILY. 360 tablet 0     tiotropium (SPIRIVA) 18 mcg inhalation capsule Place 18 mcg into inhaler and inhale daily.       VITAMIN D2 50,000 unit capsule Take 50,000 Units by mouth 2 (two) times a week. SUN and WED  3     warfarin ANTICOAGULANT (COUMADIN/JANTOVEN) 2.5 MG tablet Takes 1 tablet (2.5mg) to 2 tablets (5mg) by mouth daily, as directed.  Adjust dose based on INR results. 130 tablet 1     No current facility-administered medications for this visit.        Physical Exam:  /62   Pulse 74   Wt 128.4 kg (283 lb 1.6 oz)   SpO2 100%   BMI 35.39 kg/m    Gen: alert, oriented, no distress  HEENT: nasal turbinates are unremarkable, no oropharyngeal lesions, no cervical or supraclavicular lymphadenopathy  CV: irreg irreg, no M/G/R  Resp: clear lungs. 2-3+ pitting edema bilateral legs  Abd: soft, nontender, no palpable organomegaly  Skin: no apparent rashes  Ext: no cyanosis, clubbing or edema  Neuro: alert, nonfocal    Labs:  Reviewed  Dec 2018  Chem panel wnl  TSH wnl  hgb 12.1 Oct 2018    Previous testing  DONNA neg  blasto neg  Histo testing neg  c-ANCA neg  HIV neg  RF neg    Bronch/LLL BAL cultures neg      Imaging studies:  CT chest April 2018  IMPRESSION:   CONCLUSION:  1.  Mild scarring and slight bronchiectasis present at both lung bases. No active pneumonia identified.    CT chest 7/15/2019  IMPRESSION:   CONCLUSION:   1.  Nodular opacity of the left lower lobe of interest on 04/17/2019 is less conspicuous today and probably explained by scarring. Additional 6 month chest CT follow-up is recommended.  2.  Emphysema and scattered areas of scarring elsewhere in both lungs.  3.  Advanced multivessel coronary artery disease.      Left Ventricle: Normal left ventricular  size.The estimated left ventricular ejection fraction is 45%.Mild concentric hypertrophy noted. Normal septal motion. Unable to assess left ventricular diastolic function given patient is in atrial fibrillation.    Wall Scoring: Resting Even with Definity contrast, left ventricular wall motion is difficult to evaluate. Suspicious of inferior basilar hypokinesis on top of mild global hypokinesis.    Right Ventricle: Normal right ventricular size and systolic function. TAPSE is normal, which is consistent with normal right ventricular systolic function. Pacer lead is present in the ventricle.    Left Atrium: Left atrial volume is severely increased.    Tricuspid Valve: Tricuspid valve not well visualized. There is no evidence of tricuspid stenosis. Mild to moderate tricuspid valve regurgitation. The TR peak velocity is over estimated. Right ventricular systolic pressure is likely within normal limits.     Compared to echocardiogram from August 29, 2018, the findings are similar but both echoes are technically challenging.  There is more suspicion of more prominent inferior basilar hypokinesis on the current study.  The TR jet that evaluated the right ventricular systolic pressure, is contaminated and suspect relatively normal right ventricular systolic pressure estimate.    PFTs 2018  FEV1/FVC is 0.56 and is reduced.  FEV1 is 73% predicted and is reduced.  FVC is 96% predicted and normal.  There was no improvement in spirometry after a single inhaled dose of bronchodilator.  TLC is 99% predicted and is normal.  RV is 113% predicted and is normal.  DLCO is 93% predicted and is normal when it   is corrected for hemoglobin.     Impression:  Full Pulmonary Function Test is abnormal.  PFTs are consistent with mild obstructive disease.  Spirometry is not consistent with reversibility.  There is no hyperinflation.  There is no air-trapping.  Diffusion capacity when corrected for hemoglobin is normal.  Declines in both FEV1  and TLC since 2009 with overall preservation of diffusing capacity.    PFTs 11/10/2029  FEV1 2.42L 68%  FVC 76%  No BD response  Ratio 0.66 (LLN 0.6)  DLco 46% han for hgb  Flow vol curve suggests obstruction.  Impression: no obstruction based on ratio >LLN but FV curve does suggest some obstruction. Significant decline in DLco compared to DLco.     July 2019  SIX MINUTE WALK TEST / HOME O2 EVALUATION     SpO2 at rest on RA 96%  SpO2 started to drop after 2 1/2 minutes walking. SpO2 after walking 2 1/2 minutes on RA was 88%  SpO2 after walking 6 minutes on RA was 87%  Distance covered 320.04 meters.  Recovery phase, SpO2 after 1 minute rest on RA was 87%  Recovery phase, SpO2 after 2 minute rest on RA was 97%     Impression:   Significant desaturation with activities.   Patient does qualify for O2 supplementation with activity.    Hugh Payton MD (Avi)  [facility]  Pager [phone]  Clinic [phone]            Again, thank you for allowing me to participate in the care of your patient.        Sincerely,        Hugh Payton MD

## 2021-11-12 NOTE — PATIENT INSTRUCTIONS
Patient Education     Left-Sided Congestive Heart Failure (CHF)    The heart is a large muscle that pumps blood throughout the body. Blood carries oxygen to all the organs including the brain, plus the muscles and skin. After the body takes the oxygen out of the blood, the blood returns to the heart. The right side of the heart collects that blood. It pumps the blood to the lungs to get fresh oxygen. This oxygen-rich blood from the lungs then returns to the left side of the heart. There it is pumped back out to the brain and rest of the body, starting the process all over.   Heart failure occurs when the heart muscle does not function normally, leading to fluid retention and reduced blood flow. This can be caused by heart muscle weakness or stiffness, or a heart valve problem.  When the left side of the heart is failing, it can t handle the blood it is getting from the lungs. Pressure then builds up in the veins of the lungs, causing fluid to leak into the lung tissues. This may be referred to as congestive heart failure. This causes you to feel short of breath, weak, or dizzy. These symptoms are often worse with exertion, such as climbing stairs or walking up hills. Lying flat is uncomfortable and can make your breathing worse. This may make sleeping difficult and force you to use extra pillows to elevate your upper body to help you sleep well. You may also feel weak or tired and have less energy during exertion.  Causes of heart failure include:    Coronary artery disease    Heart attack in the past (also known as acute myocardial infarction, or AMI)    High blood pressure    Damaged heart valve    Diabetes    Obesity    Cigarette smoking    Alcohol abuse  Treatment  Heart failure is usually a chronic condition. The purpose of medical treatment is to improve the pumping action of the heart, and remove excess water and fluids from the body. A number of medicines can help reach this goal, improve symptoms and keep  the heart from becoming weaker. In some cases of severe heart failure, a mechanical device will be placed in the heart to help the heart pump. Another major goal is to better treat the causes of heart failure, such as diabetes, high blood pressure, and your lifestyle.  Home care    Check your weight every day. A sudden increase in weight gain could mean worsening heart failure.  ? Use the same scale every day.  ? Weigh yourself at the same time every day.  ? Make sure the scale is on the floor, not on a rug.  ? Keep a record of your weight every day, so your healthcare provider can see it. If you are not given a log sheet for this, keep a separate journal for this purpose.     Cut back on how much salt (sodium) you eat:  ? Your provider will tell you how much salt to have daily, usually 2,000 mg or less.  ? Limit high-salt foods. These include olives, pickles, smoked meats, processed foods, and salted potato chips.  ? Don't add salt to your food at the table. Use only small amounts of salt when cooking.  ? Don't binge on salt-heavy meals.    Follow your healthcare provider's recommendations about how much fluid you should have.    Stop smoking.    Cut back on the amount of alcohol you drink.    Lose weight if you are overweight. The excess weight adds a lot of stress on the workload of the heart.    Stay active. Talk to your provider about an exercise program that is safe for your heart.    Keep your feet elevated to reduce swelling. Ask your provider about support hose as a preventive treatment for daytime leg swelling.    Follow your healthcare provider's instructions closely.  Besides taking your medicine as instructed, an important part of treatment includes lifestyle changes. These include diet, physical activity, stopping smoking, and weight control.  Improve your diet. Often in the hospital, people are given a heart healthy diet. This includes more fresh foods, lower saturated fat, less processed foods, and  lower salt.  Follow-up care  Follow up with your healthcare provider, or as advised. Make sure to keep any appointments that were made for you. This can help better control heart failure.  If an X-ray was done, you will be told of any new findings that may affect your care.  Call 911  Call 911 if you:    Become severely short of breath    Feel lightheaded, or feel like you might pass out or faint    Have chest pain or discomfort that is different than usual, the medicines your provider told you to use for this don't help, or the pain lasts longer than 10 to 15 minutes    Develop a rapid heart rate suddenly  When to seek medical advice  Call your healthcare provider right away if you have any of these signs of worsening heart failure:    Sudden weight gain. This means more than 2 pounds in 1 day, or 5 pounds in 1 week, or whatever weight gain you were told to report by your provider    Trouble breathing not related to being active    New or increased swelling of your legs or ankles    Swelling or pain in your abdomen    Breathing trouble at night, waking up short of breath or needing more pillows to elevate your upper body to help you breathe    Frequent coughing that doesn t go away    Feeling much more tired than usual  [employer] last reviewed this educational content on 5/1/2018 2000-2021 [employer]. All rights reserved. This information is not intended as a substitute for professional medical care. Always follow your healthcare professional's instructions.         increase Lasix to 80mg daily  Echocardiogram next week  Blood work next week to monitor kidney function  Follow up with Dr. Thelma HUITRON or heart failure clinic

## 2021-11-15 ENCOUNTER — TELEPHONE (OUTPATIENT)
Dept: PULMONOLOGY | Facility: OTHER | Age: 76
End: 2021-11-15
Payer: COMMERCIAL

## 2021-11-15 NOTE — TELEPHONE ENCOUNTER
Called to see if Buck was feeling any better, said he was feeling a little better that the water pills are working and his breathing is a little better. Has not heard from [facility] yet, called them and ask to please call and follow up with him . Instructed to call tomorrow if he does not hear from them. Also instructed to call with any questions or concerns.

## 2021-11-15 NOTE — TELEPHONE ENCOUNTER
Central Prior Authorization Team   Phone: [phone]    PA Initiation    Medication: Furosemide 40MG tablets  Insurance Company:    Pharmacy Filling the Rx: [id number] [place] [address] [address] AT [facility] [address]  Filling Pharmacy Phone: [phone]  Filling Pharmacy Fax:    Start Date: 11/15/2021

## 2021-11-15 NOTE — TELEPHONE ENCOUNTER
Please advise per call to [facility] commercial insurance this medication is covered however the quantity limit is 1 tablet per day.   Rep is asking if provider is willing to change to Furosemide 80mg tablets taking 1 per day?    Directions on Rx did not indicate patient was taking 1 tablet twice daily, just 2 tablets at the same time once daily.     If Furosemide 80mg taking 1 tablet daily is appropriate a new Rx would just need to be sent to pharmacy.  If this is not appropriate please send back this request with medical reasoning and PA team can continue with request.

## 2021-11-15 NOTE — TELEPHONE ENCOUNTER
Prior Auth :  Medication : Furosemide 40 mg tabs  Pharmacy : [facility] [address] [place].   Phone [phone]  Fax : [phone]    Insurance : [employer]  ID #  [id number]

## 2021-11-16 ENCOUNTER — DOCUMENTATION ONLY (OUTPATIENT)
Dept: ANTICOAGULATION | Facility: CLINIC | Age: 76
End: 2021-11-16
Payer: COMMERCIAL

## 2021-11-16 ENCOUNTER — TRANSFERRED RECORDS (OUTPATIENT)
Dept: HEALTH INFORMATION MANAGEMENT | Facility: CLINIC | Age: 76
End: 2021-11-16
Payer: COMMERCIAL

## 2021-11-16 DIAGNOSIS — I48.19 PERSISTENT ATRIAL FIBRILLATION (H): ICD-10-CM

## 2021-11-16 DIAGNOSIS — I48.91 ATRIAL FIBRILLATION (H): Primary | ICD-10-CM

## 2021-11-16 DIAGNOSIS — I50.9 CONGESTIVE HEART FAILURE, UNSPECIFIED HF CHRONICITY, UNSPECIFIED HEART FAILURE TYPE (H): ICD-10-CM

## 2021-11-16 LAB — INR (EXTERNAL): 2.2 (ref 0.9–1.1)

## 2021-11-16 RX ORDER — FUROSEMIDE 80 MG
80 TABLET ORAL DAILY
Qty: 30 TABLET | Refills: 6 | Status: SHIPPED | OUTPATIENT
Start: 2021-11-16 | End: 2021-12-17

## 2021-11-16 NOTE — PROGRESS NOTES
ANTICOAGULATION  MANAGEMENT-Home Monitor Managed by Exception    Buck Sinclair 76 year old male is on warfarin with therapeutic INR result. (Goal INR 2.0-3.0)    Recent labs: (last 7 days)     11/16/21  0000   INR 2.2*         Previous INR was Therapeutic    Medication, diet, health changes since last INR:chart reviewed; none identified    Contacted within the last 12 weeks by phone on 10/19      LUCAS Jane was NOT contacted regarding therapeutic result today per home monitoring policy manage by exception agreement.   Current warfarin dose is to be continued:     Summary  As of 11/16/2021    Full warfarin instructions:  5 mg every Mon, Thu; 2.5 mg all other days   Next INR check:  11/23/2021           ?   Judy Hilliard RN  Anticoagulation Clinic  11/16/2021    _______________________________________________________________________     Anticoagulation Episode Summary     Current INR goal:  2.0-3.0   TTR:  88.7 % (1 y)   Target end date:  Indefinite   Send INR reminders to:  [facility]    Indications    Persistent Atrial Fibrillation [I48.91]  Persistent atrial fibrillation (H) [I48.19]           Comments:  home monitor talk to wife about dose Neela   [phone]         Anticoagulation Care Providers     Provider Role Specialty Phone number    Erica Hansen APRN [facility]  Family Medicine [phone]    Everett Renee MD  Cardiovascular Disease [phone]

## 2021-11-17 ENCOUNTER — TRANSCRIBE ORDERS (OUTPATIENT)
Dept: CARDIOLOGY | Facility: CLINIC | Age: 76
End: 2021-11-17
Payer: COMMERCIAL

## 2021-11-17 ENCOUNTER — OFFICE VISIT (OUTPATIENT)
Dept: CARDIOLOGY | Facility: CLINIC | Age: 76
End: 2021-11-17
Attending: INTERNAL MEDICINE
Payer: OTHER MISCELLANEOUS

## 2021-11-17 VITALS
RESPIRATION RATE: 18 BRPM | DIASTOLIC BLOOD PRESSURE: 62 MMHG | BODY MASS INDEX: 33.75 KG/M2 | OXYGEN SATURATION: 94 % | WEIGHT: 270 LBS | HEART RATE: 74 BPM | SYSTOLIC BLOOD PRESSURE: 102 MMHG

## 2021-11-17 DIAGNOSIS — Z95.810 ICD (IMPLANTABLE CARDIOVERTER-DEFIBRILLATOR) IN PLACE: Primary | ICD-10-CM

## 2021-11-17 DIAGNOSIS — I48.19 PERSISTENT ATRIAL FIBRILLATION (H): ICD-10-CM

## 2021-11-17 DIAGNOSIS — I50.9 CHF (CONGESTIVE HEART FAILURE) (H): ICD-10-CM

## 2021-11-17 DIAGNOSIS — I42.9 SECONDARY CARDIOMYOPATHY (H): Primary | ICD-10-CM

## 2021-11-17 DIAGNOSIS — I48.19 PERSISTENT ATRIAL FIBRILLATION (H): Primary | ICD-10-CM

## 2021-11-17 DIAGNOSIS — Z95.810 ICD (IMPLANTABLE CARDIOVERTER-DEFIBRILLATOR) IN PLACE: Primary | Chronic | ICD-10-CM

## 2021-11-17 DIAGNOSIS — Z45.02 ICD (IMPLANTABLE CARDIOVERTER-DEFIBRILLATOR) BATTERY DEPLETION: ICD-10-CM

## 2021-11-17 LAB
ALBUMIN SERPL-MCNC: 3.8 G/DL (ref 3.5–5)
ALP SERPL-CCNC: 48 U/L (ref 45–120)
ALT SERPL W P-5'-P-CCNC: 16 U/L (ref 0–45)
ANION GAP SERPL CALCULATED.3IONS-SCNC: 10 MMOL/L (ref 5–18)
AST SERPL W P-5'-P-CCNC: 23 U/L (ref 0–40)
BILIRUB SERPL-MCNC: 1 MG/DL (ref 0–1)
BUN SERPL-MCNC: 37 MG/DL (ref 8–28)
CALCIUM SERPL-MCNC: 9.8 MG/DL (ref 8.5–10.5)
CHLORIDE BLD-SCNC: 106 MMOL/L (ref 98–107)
CO2 SERPL-SCNC: 26 MMOL/L (ref 22–31)
CREAT SERPL-MCNC: 1.43 MG/DL (ref 0.7–1.3)
ERYTHROCYTE [DISTWIDTH] IN BLOOD BY AUTOMATED COUNT: 14.9 % (ref 10–15)
GFR SERPL CREATININE-BSD FRML MDRD: 47 ML/MIN/1.73M2
GLUCOSE BLD-MCNC: 112 MG/DL (ref 70–125)
HCT VFR BLD AUTO: 39.1 % (ref 40–53)
HGB BLD-MCNC: 11.7 G/DL (ref 13.3–17.7)
MCH RBC QN AUTO: 31.4 PG (ref 26.5–33)
MCHC RBC AUTO-ENTMCNC: 29.9 G/DL (ref 31.5–36.5)
MCV RBC AUTO: 105 FL (ref 78–100)
PLATELET # BLD AUTO: 171 10E3/UL (ref 150–450)
POTASSIUM BLD-SCNC: 5.1 MMOL/L (ref 3.5–5)
PROT SERPL-MCNC: 7.3 G/DL (ref 6–8)
RBC # BLD AUTO: 3.73 10E6/UL (ref 4.4–5.9)
SODIUM SERPL-SCNC: 142 MMOL/L (ref 136–145)
WBC # BLD AUTO: 8.3 10E3/UL (ref 4–11)

## 2021-11-17 PROCEDURE — 85027 COMPLETE CBC AUTOMATED: CPT | Performed by: INTERNAL MEDICINE

## 2021-11-17 PROCEDURE — 80053 COMPREHEN METABOLIC PANEL: CPT | Performed by: INTERNAL MEDICINE

## 2021-11-17 PROCEDURE — 93284 PRGRMG EVAL IMPLANTABLE DFB: CPT | Performed by: INTERNAL MEDICINE

## 2021-11-17 PROCEDURE — 99215 OFFICE O/P EST HI 40 MIN: CPT | Performed by: INTERNAL MEDICINE

## 2021-11-17 PROCEDURE — 36415 COLL VENOUS BLD VENIPUNCTURE: CPT | Performed by: INTERNAL MEDICINE

## 2021-11-17 NOTE — LETTER
11/17/2021    Vivek Guerrero MD  [facility] [address]  [facility] [address]    RE: Buck Sinclair       Dear Colleague,    I had the pleasure of seeing Buck Sinclair in the [facility].    [facility] Note  :  Assessment:  Atrial atrial fibrillation dating back to 1997 with multiple external cardioversions        Atrial fibrillation is very symptomatic with generalized weakness fatigue but not so much palpitations  Chronic amiodarone was partially effective;Continued for many years ; stopped because of photosensitive and other adverse effects   Failed Tikosyn;  Pulmonary vein isolation done on 2 occasions 2011   Post PVI Cardoversion February 12/ 2015 ; CV 10-         Cardioversion  11-        Atrial fibrillation April 25, 2017        CV 5-; relapse < 36 hour  Long-standing advanced dilated cardiomyopathy dating back to 1997       Recent stress nuclear scan; April 30 2014 showed ejection fraction 47% without ischemia        Echocardiogram 21 September 2015 showed ejection fraction equals 50%       Stress nuclear scan 11- EF=63%       Transesophageal echocardiogram December 4, 2017; ejection fraction 50% sinus node dysfunction   MedtronicPacemaker implanted May 1999-dual atrial leads;         generator replacement May 2005          Removal of atrial and ventricular pacing leads with placement of ICD system 6 1 2014 and Medtronic single coil   Chronic anticoagulation with warfarin ; he had excessive skin bleeding from Eliquis   Obesity-substantial weight reduction on conscientious diet   COPD felt be related to previous  exposure   Abnormal diffusion capacity-40%  Hypertension  Hyperlipidemia well controlled on statin;   Coronary artery disease with stenting to proximal LAD September 20, 2017  Right hand swelling may be related to the subclavian venous  obstruction-be difficult to put  another lead in the right subclavian area  Pulmonary hypertension with estimated PA pressure greater than 70      Plan:    You have had more shortness of breath and desaturations; now on 5 L oxygen  Furosemide was increased from 40 to 80 mg with a 10 pound diuresis-not much change in oxygen saturations status  Persistent  atrial fibrillation for the past month.  Coincident with atrial fibrillation there has been substantial increase in chest congestion as shown on OptiVol  Heart rate is controlled and there is a fair amount of ventricular pacing  Continue furosemide 80 mg daily  Blood work today will include a comprehensive metabolic panel, CBC and INR  Schedule for cardioversion with [employer] at [facility] on Friday-they will talk to about obtaining a Covid test before that procedure  You have an appointment with Dr. Garrett for further management of congestive heart failure  You are scheduled for an echocardiogram-previously the ejection fractions have generally been in the 50% range    Subjective:    I had the opportunity to see.Buck Sinclair , who is a 76 year old male with a known history of this of breath and desaturation  Recently saw Dr. Guerrero for further pulmonary evaluation was noted to have rather severe diffusion abnormalities-40% of normal  He has been on 5 L nasal oxygen.  His oxygen saturations are pretty good at rest but with activity even on oxygen his saturations drop less than 80%  Is noted to be fluid overloaded, furosemide was increased from 40 to 80 mg and he had a 10 pound diuresis.  Symptoms seem to have affected his oxygen saturation much  Has been atrial fibrillation for about a month.  Previously he went quite a while without atrial fibrillation although he has had multiple previous cardioversions A. fib ablations and trouble with amiodarone currently on high-dose sotalol 160 mg twice daily  Has a moderate amount of ventricular pacing greater than 60%  His OptiVol shows  substantial fluid increase coincident with onset of atrial fibrillation    He is very debilitated  Can walk short distances before his oxygen saturations drop when he becomes breathless  No anginal-like chest pains    Blood pressure very difficult to obtain and could hardly feel a pulse.  With Doppler I was able to get a blood pressure of 100 could not auscultate a blood pressure  We discussed management  He certainly does worse when he is in atrial fibrillation has been out of atrial fibrillation for quite some time the past year he has been out of atrial fibrillation at least 10 months he clearly does worse when he is in atrial fibrillation  Not sure if ventricular pacing is contributing  If so, it might be a candidate for resynchronization therapy  Is nearing his battery depletion has about 6 months before HANNAH  Takes warfarin his INR has been well regulated  On October 19, 2020 creatinine 1.1  Electrolytes normal    I discussed admitting patient to the hospital since he is doing poorly but he refuses to go to the hospital for admission    As his condition deteriorates, need to consider CODE STATUS etc. Need for intubation           Problem List:  Patient Active Problem List   Diagnosis     Dyslipidemia, goal LDL below 70     Essential hypertension     Persistent Atrial Fibrillation     Cardiomyopathy (H)     ICD (implantable cardioverter-defibrillator) in place     Status post catheter ablation of atrial fibrillation     LIMA (dyspnea on exertion)     Coronary artery disease due to calcified coronary lesion     Hemoptysis     COPD, group B, by GOLD 2017 classification (H)     Hypertrophic obstructive cardiomyopathy (H)     Asthma without status asthmaticus     Mononeuritis     Obesity     Persistent atrial fibrillation (H)     Medical History:  Past Medical History:   Diagnosis Date     Anemia      Asthma without status asthmaticus 5/5/2021     BPH (benign prostatic hyperplasia)      Cardiomyopathy (H)      COPD,  group B, by GOLD 2017 classification (H)      Coronary artery disease due to calcified coronary lesion      Dyslipidemia, goal LDL below 70      Essential hypertension      History of transfusion      Persistent atrial fibrillation (H)      Pneumonia of left lower lobe due to infectious organism 10/4/2017     Skin cancer of trunk      Status post catheter ablation of atrial fibrillation 6/7/2017    PVI 4-2011 (Cryo/PVI + roof line + CTI line) Re-do PVI 7-2011 (RFA/PVI + CFE + VIDYA + confirmed CTI line)     Ventricular tachycardia (H)      Surgical History:  Past Surgical History:   Procedure Laterality Date     C MYERS W/O FACETEC FORAMOT/DSKC 1/2 VRT SEG, CERVICAL      Laminectomy Lumbar;  Recorded: 03/09/2012;     CARDIAC DEFIBRILLATOR PLACEMENT       CARDIOVERSION  07/11/2018    x20, last 2/12/15, 10/2015, 11/18/16, 6/16/17 by Lauren Foster CNP     CARDIOVERSION  07/11/2018     COLONOSCOPY N/A 4/28/2017    Procedure: COLONOSCOPY with 2 ascending polyps and 1 transverse polyp;  Surgeon: Jose Whittington MD;  Location: [facility] GI;  Service:      CV CORONARY ANGIOGRAM N/A 9/20/2017    Procedure: Coronary Angiogram;  Surgeon: Sergio Cervantes MD;  Location: [facility];  Service:      EP ICD INSERT       FRACTURE SURGERY Left     wrist     INGUINAL HERNIA REPAIR Left 1967    while in the [employer] in [place] after 13 month in [place]     INSERT / REPLACE / REMOVE PACEMAKER       IR MISCELLANEOUS PROCEDURE  4/30/2014     OTHER SURGICAL HISTORY      left hand surgery---tendon repair     ND ABLATE HEART DYSRHYTHM FOCUS  04/2011    Catheter Ablation Atrial Fibrillation PVI Apr 2011 (Cryo+RF-PVI + roof line + CTI line)     ND ABLATE HEART DYSRHYTHM FOCUS  07/2011    Re-do PVI Jul 2011 (RFA-PVI + CFE + VIDYA + confirmation of CTI line)     TOTAL SHOULDER REPLACEMENT Right 03/03/2016    Dr. Abernathy of [facility]     Social History:  Social History     Socioeconomic History     Marital status:       Spouse name: Not on file     Number of children: Not on file     Years of education: Not on file     Highest education level: Not on file   Occupational History     Not on file   Tobacco Use     Smoking status: Former Smoker     Packs/day: 1.00     Years: 4.00     Pack years: 4.00     Types: Cigarettes     Quit date: 1968     Years since quittin.9     Smokeless tobacco: Never Used   Substance and Sexual Activity     Alcohol use: Yes     Alcohol/week: 2.0 standard drinks     Comment: Alcoholic Drinks/day: 1 beer per week     Drug use: No     Sexual activity: Yes     Partners: Female     Birth control/protection: Post-menopausal   Other Topics Concern     Not on file   Social History Narrative    Preloaded 2013     Social Determinants of Health     Financial Resource Strain: Not on file   Food Insecurity: Not on file   Transportation Needs: Not on file   Physical Activity: Not on file   Stress: Not on file   Social Connections: Not on file   Intimate Partner Violence: Not on file   Housing Stability: Not on file       Review of Systems   ,         Family History:  Family History   Problem Relation Age of Onset     Cancer Mother         leukemia     Cancer Father         bladder     Cancer Sister         breast with lung met.     Aneurysm Sister      CABG Brother      CABG Brother      Valvular heart disease Brother         valve replacement         Allergies:  Allergies   Allergen Reactions     Adhesive Tape Other (See Comments)     ADHESIVE TAPE; SKIN IRRITATION  Foam tape:  Skin removed with tape removal  Skin pulled off with foam tape       Amiodarone      ADVERSE REACTION.  Sunlight sensitivity.     Lisinopril        Medications:  Current Outpatient Medications   Medication Sig Dispense Refill     acetaminophen (TYLENOL) 325 MG tablet Take 650 mg by mouth 2 times daily       albuterol (PROAIR HFA/PROVENTIL HFA/VENTOLIN HFA) 108 (90 Base) MCG/ACT inhaler Inhale 2 puffs into the lungs every 4 hours as  needed for shortness of breath / dyspnea or wheezing       amoxicillin (AMOXIL) 500 MG tablet Take 500 mg by mouth       Ascorbic Acid (VITAMIN C) 500 MG CAPS Take 1,000 mg by mouth daily       atorvastatin (LIPITOR) 40 MG tablet Take 40 mg by mouth daily       budesonide-formoterol (SYMBICORT) 160-4.5 MCG/ACT Inhaler Inhale 2 puffs into the lungs 2 times daily       co-enzyme Q-10 100 MG CAPS capsule Take by mouth daily       fish oil-omega-3 fatty acids 1000 MG capsule Take 2 g by mouth 2 times daily       FLUoxetine (PROZAC) 20 MG capsule TAKE 1 CAPSULE BY MOUTH EVERY DAY       furosemide (LASIX) 40 MG tablet Take 2 tablets (80 mg) by mouth daily 90 tablet 3     furosemide (LASIX) 80 MG tablet Take 1 tablet (80 mg) by mouth daily 30 tablet 6     losartan (COZAAR) 50 MG tablet Take 1 tablet (50 mg) by mouth daily 90 tablet 3     magnesium 250 MG tablet Take 1 tablet by mouth daily       multivitamin, therapeutic (THERA-VIT) TABS tablet Take 1 tablet by mouth daily       OXYGEN-HELIUM IN        polyethylene glycol (MIRALAX) 17 GM/Dose powder Take 17 g by mouth       sotalol (BETAPACE) 160 MG tablet Take 160 mg by mouth 2 times daily       tiotropium (SPIRIVA) 18 MCG inhaled capsule Inhale 18 mcg into the lungs daily       vitamin D2 (ERGOCALCIFEROL) 83100 units (1250 mcg) capsule TAKE 1 CAPSULE BY MOUTH 2 TIMES A WEEK       warfarin ANTICOAGULANT (COUMADIN) 2.5 MG tablet Take 5mg (two tablets) on Monday and Thursday and 2.5mg (one tablet) all other days. Or as directed. 90 tablet 0         Surgical site  Patient is well-healed to right subclavicular region    Device interrogation  Persistent atrial fibrillation;  Good for 6 months  Atrial fib 63 days rate greater than 122%  Ventricular pacing 66%  Lead function satisfactory    Objective:   Vital signs:  /62 (BP Location: Left arm, Patient Position: Sitting, Cuff Size: Adult Large)   Pulse 74   Resp 18   Wt 122.5 kg (270 lb)   SpO2 94%   BMI 33.75 kg/m     Unable to feel radial or brachial pulses  Unable to auscultate blood pressure  With Doppler blood pressure 105 left brachial    Physical Exam:  Pale chronic and acutely ill man  Using nasal oxygen  Alert appropriate      GENERAL APPEARANCE: Alert, cooperative and in no acute distress.  HEENT: No scleral icterus. No Xanthelasma. Oral mucous membranes pink and moist.  NECK: JVP difficult to assess but no apparent neck vein distention cm. No Hepatojugular reflux. Thyroid not  Palpable  CHEST: clear to auscultation and percussion  CARDIOVASCULAR: S1, S2 without murmur    Brachial, radial  pulses are intact and symmetric.   No carotid bruits noted.  ABDOMEN: Non tender. BS+. No bruits.  EXTREMITIES: No cyanosis, clubbing or edema.    Lab Results:  LIPIDS:  Lab Results   Component Value Date    CHOL 117 09/09/2021    CHOL 116 10/08/2020    CHOL 133 02/04/2020     Lab Results   Component Value Date    HDL 54 09/09/2021    HDL 51 10/08/2020    HDL 52 02/04/2020     No components found for: LDLCALC  Lab Results   Component Value Date    TRIG 43 09/09/2021    TRIG 44 10/08/2020    TRIG 75 02/04/2020     No components found for: CHOLHDL    BMP:  Lab Results   Component Value Date    BUN 30 (H) 10/19/2021     10/19/2021    CO2 25 10/19/2021         This note has been dictated using voice recognition software. Any grammatical or context distortions are unintentional and inherent to the software.  Everett Renee MD  [facility] HE[facility] CARE  [phone]      Thank you for allowing me to participate in the care of your patient.      Sincerely,     Everett Renee MD, MD   [facility]      cc:   Everett Renee MD  [address] [place] [id number]

## 2021-11-17 NOTE — PATIENT INSTRUCTIONS
Your next ICD check will be on 3/2/2022 with your home monitor- this will occur automatically.    Buck Sinclair    Thank you for coming to the [facility] today for a cardiac evaluation  It was my pleasure to see you today  A good contact for any questions would be Val Angeles  RN @ [phone]    You have had more shortness of breath and desaturations; now on 5 L oxygen  Furosemide was increased from 40 to 80 mg with a 10 pound diuresis-not much change in oxygen saturations status  Persistent  atrial fibrillation for the past month.  Coincident with atrial fibrillation there has been substantial increase in chest congestion as shown on OptiVol  Heart rate is controlled and there is a fair amount of ventricular pacing  Continue furosemide 80 mg daily  Blood work today will include a comprehensive metabolic panel, CBC and INR  Schedule for cardioversion with [employer] at [facility] on Friday-they will talk to about obtaining a Covid test before that procedure  You have an appointment with Dr. Garrett for further management of congestive heart failure  You are scheduled for an echocardiogram-previously the ejection fractions have generally been in the 50% range

## 2021-11-17 NOTE — LETTER
November 19, 2021      Buck Sinclair  [address] [place] [address]        Dear ,    We are writing to inform you of your test results.    Your test results fall within the expected range(s) or remain unchanged from previous results.  Please continue with current treatment plan.    Resulted Orders   CBC with platelets   Result Value Ref Range    WBC Count 8.3 4.0 - 11.0 10e3/uL    RBC Count 3.73 (L) 4.40 - 5.90 10e6/uL    Hemoglobin 11.7 (L) 13.3 - 17.7 g/dL    Hematocrit 39.1 (L) 40.0 - 53.0 %     (H) 78 - 100 fL    MCH 31.4 26.5 - 33.0 pg    MCHC 29.9 (L) 31.5 - 36.5 g/dL    RDW 14.9 10.0 - 15.0 %    Platelet Count 171 150 - 450 10e3/uL   Comprehensive metabolic panel   Result Value Ref Range    Sodium 142 136 - 145 mmol/L    Potassium 5.1 (H) 3.5 - 5.0 mmol/L    Chloride 106 98 - 107 mmol/L    Carbon Dioxide (CO2) 26 22 - 31 mmol/L    Anion Gap 10 5 - 18 mmol/L    Urea Nitrogen 37 (H) 8 - 28 mg/dL    Creatinine 1.43 (H) 0.70 - 1.30 mg/dL    Calcium 9.8 8.5 - 10.5 mg/dL    Glucose 112 70 - 125 mg/dL    Alkaline Phosphatase 48 45 - 120 U/L    AST 23 0 - 40 U/L    ALT 16 0 - 45 U/L    Protein Total 7.3 6.0 - 8.0 g/dL    Albumin 3.8 3.5 - 5.0 g/dL    Bilirubin Total 1.0 0.0 - 1.0 mg/dL    GFR Estimate 47 (L) >60 mL/min/1.73m2      Comment:      As of July 11, 2021, eGFR is calculated by the CKD-EPI creatinine equation, without race adjustment. eGFR can be influenced by muscle mass, exercise, and diet. The reported eGFR is an estimation only and is only applicable if the renal function is stable.       If you have any questions or concerns, please call the clinic at the number listed above.       Sincerely,      Everett Renee MD

## 2021-11-17 NOTE — H&P (VIEW-ONLY)
[facility] Care Note  :  Assessment:  Atrial atrial fibrillation dating back to 1997 with multiple external cardioversions        Atrial fibrillation is very symptomatic with generalized weakness fatigue but not so much palpitations  Chronic amiodarone was partially effective;Continued for many years ; stopped because of photosensitive and other adverse effects   Failed Tikosyn;  Pulmonary vein isolation done on 2 occasions 2011   Post PVI Cardoversion February 12/ 2015 ; CV 10-         Cardioversion  11-        Atrial fibrillation April 25, 2017        CV 5-; relapse < 36 hour  Long-standing advanced dilated cardiomyopathy dating back to 1997       Recent stress nuclear scan; April 30 2014 showed ejection fraction 47% without ischemia        Echocardiogram 21 September 2015 showed ejection fraction equals 50%       Stress nuclear scan 11- EF=63%       Transesophageal echocardiogram December 4, 2017; ejection fraction 50% sinus node dysfunction   MedtronicPacemaker implanted May 1999-dual atrial leads;         generator replacement May 2005          Removal of atrial and ventricular pacing leads with placement of ICD system 6 1 2014 and Medtronic single coil   Chronic anticoagulation with warfarin ; he had excessive skin bleeding from Eliquis   Obesity-substantial weight reduction on conscientious diet   COPD felt be related to previous  exposure   Abnormal diffusion capacity-40%  Hypertension  Hyperlipidemia well controlled on statin;   Coronary artery disease with stenting to proximal LAD September 20, 2017  Right hand swelling may be related to the subclavian venous  obstruction-be difficult to put another lead in the right subclavian area  Pulmonary hypertension with estimated PA pressure greater than 70      Plan:    You have had more shortness of breath and desaturations; now on 5 L oxygen  Furosemide was increased from 40 to 80 mg with a 10 pound diuresis-not much  change in oxygen saturations status  Persistent  atrial fibrillation for the past month.  Coincident with atrial fibrillation there has been substantial increase in chest congestion as shown on OptiVol  Heart rate is controlled and there is a fair amount of ventricular pacing  Continue furosemide 80 mg daily  Blood work today will include a comprehensive metabolic panel, CBC and INR  Schedule for cardioversion with [employer] at [facility] on Friday-they will talk to about obtaining a Covid test before that procedure  You have an appointment with Dr. Garrett for further management of congestive heart failure  You are scheduled for an echocardiogram-previously the ejection fractions have generally been in the 50% range    Subjective:    I had the opportunity to see.Buck Sinclair , who is a 76 year old male with a known history of this of breath and desaturation  Recently saw Dr. Guerrero for further pulmonary evaluation was noted to have rather severe diffusion abnormalities-40% of normal  He has been on 5 L nasal oxygen.  His oxygen saturations are pretty good at rest but with activity even on oxygen his saturations drop less than 80%  Is noted to be fluid overloaded, furosemide was increased from 40 to 80 mg and he had a 10 pound diuresis.  Symptoms seem to have affected his oxygen saturation much  Has been atrial fibrillation for about a month.  Previously he went quite a while without atrial fibrillation although he has had multiple previous cardioversions A. fib ablations and trouble with amiodarone currently on high-dose sotalol 160 mg twice daily  Has a moderate amount of ventricular pacing greater than 60%  His OptiVol shows substantial fluid increase coincident with onset of atrial fibrillation    He is very debilitated  Can walk short distances before his oxygen saturations drop when he becomes breathless  No anginal-like chest pains    Blood pressure very difficult to obtain and could hardly feel  a pulse.  With Doppler I was able to get a blood pressure of 100 could not auscultate a blood pressure  We discussed management  He certainly does worse when he is in atrial fibrillation has been out of atrial fibrillation for quite some time the past year he has been out of atrial fibrillation at least 10 months he clearly does worse when he is in atrial fibrillation  Not sure if ventricular pacing is contributing  If so, it might be a candidate for resynchronization therapy  Is nearing his battery depletion has about 6 months before HANNAH  Takes warfarin his INR has been well regulated  On October 19, 2020 creatinine 1.1  Electrolytes normal    I discussed admitting patient to the hospital since he is doing poorly but he refuses to go to the hospital for admission    As his condition deteriorates, need to consider CODE STATUS etc. Need for intubation           Problem List:  Patient Active Problem List   Diagnosis     Dyslipidemia, goal LDL below 70     Essential hypertension     Persistent Atrial Fibrillation     Cardiomyopathy (H)     ICD (implantable cardioverter-defibrillator) in place     Status post catheter ablation of atrial fibrillation     LIMA (dyspnea on exertion)     Coronary artery disease due to calcified coronary lesion     Hemoptysis     COPD, group B, by GOLD 2017 classification (H)     Hypertrophic obstructive cardiomyopathy (H)     Asthma without status asthmaticus     Mononeuritis     Obesity     Persistent atrial fibrillation (H)     Medical History:  Past Medical History:   Diagnosis Date     Anemia      Asthma without status asthmaticus 5/5/2021     BPH (benign prostatic hyperplasia)      Cardiomyopathy (H)      COPD, group B, by GOLD 2017 classification (H)      Coronary artery disease due to calcified coronary lesion      Dyslipidemia, goal LDL below 70      Essential hypertension      History of transfusion      Persistent atrial fibrillation (H)      Pneumonia of left lower lobe due to  infectious organism 10/4/2017     Skin cancer of trunk      Status post catheter ablation of atrial fibrillation 6/7/2017    PVI 4-2011 (Cryo/PVI + roof line + CTI line) Re-do PVI 7-2011 (RFA/PVI + CFE + VIDYA + confirmed CTI line)     Ventricular tachycardia (H)      Surgical History:  Past Surgical History:   Procedure Laterality Date     C MYERS W/O FACETEC FORAMOT/[facility]KC 1/2 VRT SEG, CERVICAL      Laminectomy Lumbar;  Recorded: 03/09/2012;     CARDIAC DEFIBRILLATOR PLACEMENT       CARDIOVERSION  07/11/2018    x20, last 2/12/15, 10/2015, 11/18/16, 6/16/17 by Lauren Foster CNP     CARDIOVERSION  07/11/2018     COLONOSCOPY N/A 4/28/2017    Procedure: COLONOSCOPY with 2 ascending polyps and 1 transverse polyp;  Surgeon: Jose Whittington MD;  Location: [facility] GI;  Service:      CV CORONARY ANGIOGRAM N/A 9/20/2017    Procedure: Coronary Angiogram;  Surgeon: Sergio Cervantes MD;  Location: [facility] Cath Lab;  Service:      EP ICD INSERT       FRACTURE SURGERY Left     wrist     INGUINAL HERNIA REPAIR Left 1967    while in the [employer] in [employer] [place] [employer] after 13 month in [place]     INSERT / REPLACE / REMOVE PACEMAKER       IR MISCELLANEOUS PROCEDURE  4/30/2014     OTHER SURGICAL HISTORY      left hand surgery---tendon repair     WI ABLATE HEART DYSRHYTHM FOCUS  04/2011    Catheter Ablation Atrial Fibrillation PVI Apr 2011 (Cryo+RF-PVI + roof line + CTI line)     WI ABLATE HEART DYSRHYTHM FOCUS  07/2011    Re-do PVI Jul 2011 (RFA-PVI + CFE + VIDYA + confirmation of CTI line)     TOTAL SHOULDER REPLACEMENT Right 03/03/2016    Dr. Abernathy of [facility]     Social History:  Social History     Socioeconomic History     Marital status:      Spouse name: Not on file     Number of children: Not on file     Years of education: Not on file     Highest education level: Not on file   Occupational History     Not on file   Tobacco Use     Smoking status: Former Smoker     Packs/day: 1.00     Years: 4.00     Pack years:  4.00     Types: Cigarettes     Quit date: 1968     Years since quittin.9     Smokeless tobacco: Never Used   Substance and Sexual Activity     Alcohol use: Yes     Alcohol/week: 2.0 standard drinks     Comment: Alcoholic Drinks/day: 1 beer per week     Drug use: No     Sexual activity: Yes     Partners: Female     Birth control/protection: Post-menopausal   Other Topics Concern     Not on file   Social History Narrative    Preloaded 2013     Social Determinants of Health     Financial Resource Strain: Not on file   Food Insecurity: Not on file   Transportation Needs: Not on file   Physical Activity: Not on file   Stress: Not on file   Social Connections: Not on file   Intimate Partner Violence: Not on file   Housing Stability: Not on file       Review of Systems   ,         Family History:  Family History   Problem Relation Age of Onset     Cancer Mother         leukemia     Cancer Father         bladder     Cancer Sister         breast with lung met.     Aneurysm Sister      CABG Brother      CABG Brother      Valvular heart disease Brother         valve replacement         Allergies:  Allergies   Allergen Reactions     Adhesive Tape Other (See Comments)     ADHESIVE TAPE; SKIN IRRITATION  Foam tape:  Skin removed with tape removal  Skin pulled off with foam tape       Amiodarone      ADVERSE REACTION.  Sunlight sensitivity.     Lisinopril        Medications:  Current Outpatient Medications   Medication Sig Dispense Refill     acetaminophen (TYLENOL) 325 MG tablet Take 650 mg by mouth 2 times daily       albuterol (PROAIR HFA/PROVENTIL HFA/VENTOLIN HFA) 108 (90 Base) MCG/ACT inhaler Inhale 2 puffs into the lungs every 4 hours as needed for shortness of breath / dyspnea or wheezing       amoxicillin (AMOXIL) 500 MG tablet Take 500 mg by mouth       Ascorbic Acid (VITAMIN C) 500 MG CAPS Take 1,000 mg by mouth daily       atorvastatin (LIPITOR) 40 MG tablet Take 40 mg by mouth daily        budesonide-formoterol (SYMBICORT) 160-4.5 MCG/ACT Inhaler Inhale 2 puffs into the lungs 2 times daily       co-enzyme Q-10 100 MG CAPS capsule Take by mouth daily       fish oil-omega-3 fatty acids 1000 MG capsule Take 2 g by mouth 2 times daily       FLUoxetine (PROZAC) 20 MG capsule TAKE 1 CAPSULE BY MOUTH EVERY DAY       furosemide (LASIX) 40 MG tablet Take 2 tablets (80 mg) by mouth daily 90 tablet 3     furosemide (LASIX) 80 MG tablet Take 1 tablet (80 mg) by mouth daily 30 tablet 6     losartan (COZAAR) 50 MG tablet Take 1 tablet (50 mg) by mouth daily 90 tablet 3     magnesium 250 MG tablet Take 1 tablet by mouth daily       multivitamin, therapeutic (THERA-VIT) TABS tablet Take 1 tablet by mouth daily       OXYGEN-HELIUM IN        polyethylene glycol (MIRALAX) 17 GM/Dose powder Take 17 g by mouth       sotalol (BETAPACE) 160 MG tablet Take 160 mg by mouth 2 times daily       tiotropium (SPIRIVA) 18 MCG inhaled capsule Inhale 18 mcg into the lungs daily       vitamin D2 (ERGOCALCIFEROL) 52277 units (1250 mcg) capsule TAKE 1 CAPSULE BY MOUTH 2 TIMES A WEEK       warfarin ANTICOAGULANT (COUMADIN) 2.5 MG tablet Take 5mg (two tablets) on Monday and Thursday and 2.5mg (one tablet) all other days. Or as directed. 90 tablet 0         Surgical site  Patient is well-healed to right subclavicular region    Device interrogation  Persistent atrial fibrillation;  Good for 6 months  Atrial fib 63 days rate greater than 122%  Ventricular pacing 66%  Lead function satisfactory    Objective:   Vital signs:  /62 (BP Location: Left arm, Patient Position: Sitting, Cuff Size: Adult Large)   Pulse 74   Resp 18   Wt 122.5 kg (270 lb)   SpO2 94%   BMI 33.75 kg/m    Unable to feel radial or brachial pulses  Unable to auscultate blood pressure  With Doppler blood pressure 105 left brachial    Physical Exam:  Pale chronic and acutely ill man  Using nasal oxygen  Alert appropriate      GENERAL APPEARANCE: Alert, cooperative  and in no acute distress.  HEENT: No scleral icterus. No Xanthelasma. Oral mucous membranes pink and moist.  NECK: JVP difficult to assess but no apparent neck vein distention cm. No Hepatojugular reflux. Thyroid not  Palpable  CHEST: clear to auscultation and percussion  CARDIOVASCULAR: S1, S2 without murmur    Brachial, radial  pulses are intact and symmetric.   No carotid bruits noted.  ABDOMEN: Non tender. BS+. No bruits.  EXTREMITIES: No cyanosis, clubbing or edema.    Lab Results:  LIPIDS:  Lab Results   Component Value Date    CHOL 117 09/09/2021    CHOL 116 10/08/2020    CHOL 133 02/04/2020     Lab Results   Component Value Date    HDL 54 09/09/2021    HDL 51 10/08/2020    HDL 52 02/04/2020     No components found for: LDLCALC  Lab Results   Component Value Date    TRIG 43 09/09/2021    TRIG 44 10/08/2020    TRIG 75 02/04/2020     No components found for: CHOLHDL    BMP:  Lab Results   Component Value Date    BUN 30 (H) 10/19/2021     10/19/2021    CO2 25 10/19/2021         This note has been dictated using voice recognition software. Any grammatical or context distortions are unintentional and inherent to the software.  Everett Renee MD  [facility]  [phone]

## 2021-11-17 NOTE — PROGRESS NOTES
In clinic device check with Dr Renee.  Please see link for full device report.  Patient was informed of results and next follow up during today's visit.

## 2021-11-17 NOTE — PROGRESS NOTES
[facility] Heart Care Note  :  Assessment:  Atrial atrial fibrillation dating back to 1997 with multiple external cardioversions        Atrial fibrillation is very symptomatic with generalized weakness fatigue but not so much palpitations  Chronic amiodarone was partially effective;Continued for many years ; stopped because of photosensitive and other adverse effects   Failed Tikosyn;  Pulmonary vein isolation done on 2 occasions 2011   Post PVI Cardoversion February 12/ 2015 ; CV 10-         Cardioversion  11-        Atrial fibrillation April 25, 2017        CV 5-; relapse < 36 hour  Long-standing advanced dilated cardiomyopathy dating back to 1997       Recent stress nuclear scan; April 30 2014 showed ejection fraction 47% without ischemia        Echocardiogram 21 September 2015 showed ejection fraction equals 50%       Stress nuclear scan 11- EF=63%       Transesophageal echocardiogram December 4, 2017; ejection fraction 50% sinus node dysfunction   MedtronicPacemaker implanted May 1999-dual atrial leads;         generator replacement May 2005          Removal of atrial and ventricular pacing leads with placement of ICD system 6 1 2014 and Medtronic single coil   Chronic anticoagulation with warfarin ; he had excessive skin bleeding from Eliquis   Obesity-substantial weight reduction on conscientious diet   COPD felt be related to previous  exposure   Abnormal diffusion capacity-40%  Hypertension  Hyperlipidemia well controlled on statin;   Coronary artery disease with stenting to proximal LAD September 20, 2017  Right hand swelling may be related to the subclavian venous  obstruction-be difficult to put another lead in the right subclavian area  Pulmonary hypertension with estimated PA pressure greater than 70      Plan:    You have had more shortness of breath and desaturations; now on 5 L oxygen  Furosemide was increased from 40 to 80 mg with a 10 pound diuresis-not much  change in oxygen saturations status  Persistent  atrial fibrillation for the past month.  Coincident with atrial fibrillation there has been substantial increase in chest congestion as shown on OptiVol  Heart rate is controlled and there is a fair amount of ventricular pacing  Continue furosemide 80 mg daily  Blood work today will include a comprehensive metabolic panel, CBC and INR  Schedule for cardioversion with [employer] at [facility] on Friday-they will talk to about obtaining a Covid test before that procedure  You have an appointment with Dr. Garrett for further management of congestive heart failure  You are scheduled for an echocardiogram-previously the ejection fractions have generally been in the 50% range    Subjective:    I had the opportunity to see.Buck Sinclair , who is a 76 year old male with a known history of this of breath and desaturation  Recently saw Dr. Guerrero for further pulmonary evaluation was noted to have rather severe diffusion abnormalities-40% of normal  He has been on 5 L nasal oxygen.  His oxygen saturations are pretty good at rest but with activity even on oxygen his saturations drop less than 80%  Is noted to be fluid overloaded, furosemide was increased from 40 to 80 mg and he had a 10 pound diuresis.  Symptoms seem to have affected his oxygen saturation much  Has been atrial fibrillation for about a month.  Previously he went quite a while without atrial fibrillation although he has had multiple previous cardioversions A. fib ablations and trouble with amiodarone currently on high-dose sotalol 160 mg twice daily  Has a moderate amount of ventricular pacing greater than 60%  His OptiVol shows substantial fluid increase coincident with onset of atrial fibrillation    He is very debilitated  Can walk short distances before his oxygen saturations drop when he becomes breathless  No anginal-like chest pains    Blood pressure very difficult to obtain and could hardly feel  a pulse.  With Doppler I was able to get a blood pressure of 100 could not auscultate a blood pressure  We discussed management  He certainly does worse when he is in atrial fibrillation has been out of atrial fibrillation for quite some time the past year he has been out of atrial fibrillation at least 10 months he clearly does worse when he is in atrial fibrillation  Not sure if ventricular pacing is contributing  If so, it might be a candidate for resynchronization therapy  Is nearing his battery depletion has about 6 months before HANNAH  Takes warfarin his INR has been well regulated  On October 19, 2020 creatinine 1.1  Electrolytes normal    I discussed admitting patient to the hospital since he is doing poorly but he refuses to go to the hospital for admission    As his condition deteriorates, need to consider CODE STATUS etc. Need for intubation           Problem List:  Patient Active Problem List   Diagnosis     Dyslipidemia, goal LDL below 70     Essential hypertension     Persistent Atrial Fibrillation     Cardiomyopathy (H)     ICD (implantable cardioverter-defibrillator) in place     Status post catheter ablation of atrial fibrillation     LIMA (dyspnea on exertion)     Coronary artery disease due to calcified coronary lesion     Hemoptysis     COPD, group B, by GOLD 2017 classification (H)     Hypertrophic obstructive cardiomyopathy (H)     Asthma without status asthmaticus     Mononeuritis     Obesity     Persistent atrial fibrillation (H)     Medical History:  Past Medical History:   Diagnosis Date     Anemia      Asthma without status asthmaticus 5/5/2021     BPH (benign prostatic hyperplasia)      Cardiomyopathy (H)      COPD, group B, by GOLD 2017 classification (H)      Coronary artery disease due to calcified coronary lesion      Dyslipidemia, goal LDL below 70      Essential hypertension      History of transfusion      Persistent atrial fibrillation (H)      Pneumonia of left lower lobe due to  infectious organism 10/4/2017     Skin cancer of trunk      Status post catheter ablation of atrial fibrillation 6/7/2017    PVI 4-2011 (Cryo/PVI + roof line + CTI line) Re-do PVI 7-2011 (RFA/PVI + CFE + VIDYA + confirmed CTI line)     Ventricular tachycardia (H)      Surgical History:  Past Surgical History:   Procedure Laterality Date     C MYERS W/O FACETEC FORAMOT/DSKC 1/2 VRT SEG, CERVICAL      Laminectomy Lumbar;  Recorded: 03/09/2012;     CARDIAC DEFIBRILLATOR PLACEMENT       CARDIOVERSION  07/11/2018    x20, last 2/12/15, 10/2015, 11/18/16, 6/16/17 by Lauren Foster CNP     CARDIOVERSION  07/11/2018     COLONOSCOPY N/A 4/28/2017    Procedure: COLONOSCOPY with 2 ascending polyps and 1 transverse polyp;  Surgeon: Jose Whittington MD;  Location: [facility] GI;  Service:      CV CORONARY ANGIOGRAM N/A 9/20/2017    Procedure: Coronary Angiogram;  Surgeon: Sergio Cervantes MD;  Location: [facility] Cath Lab;  Service:      EP ICD INSERT       FRACTURE SURGERY Left     wrist     INGUINAL HERNIA REPAIR Left 1967    while in the [employer] in [place] in [place]     INSERT / REPLACE / REMOVE PACEMAKER       IR MISCELLANEOUS PROCEDURE  4/30/2014     OTHER SURGICAL HISTORY      left hand surgery---tendon repair     MD ABLATE HEART DYSRHYTHM FOCUS  04/2011    Catheter Ablation Atrial Fibrillation PVI Apr 2011 (Cryo+RF-PVI + roof line + CTI line)     MD ABLATE HEART DYSRHYTHM FOCUS  07/2011    Re-do PVI Jul 2011 (RFA-PVI + CFE + VIDYA + confirmation of CTI line)     TOTAL SHOULDER REPLACEMENT Right 03/03/2016    Dr. Abernathy of [facility]     Social History:  Social History     Socioeconomic History     Marital status:      Spouse name: Not on file     Number of children: Not on file     Years of education: Not on file     Highest education level: Not on file   Occupational History     Not on file   Tobacco Use     Smoking status: Former Smoker     Packs/day: 1.00     Years: 4.00     Pack years:  4.00     Types: Cigarettes     Quit date: 1968     Years since quittin.9     Smokeless tobacco: Never Used   Substance and Sexual Activity     Alcohol use: Yes     Alcohol/week: 2.0 standard drinks     Comment: Alcoholic Drinks/day: 1 beer per week     Drug use: No     Sexual activity: Yes     Partners: Female     Birth control/protection: Post-menopausal   Other Topics Concern     Not on file   Social History Narrative    Preloaded 2013     Social Determinants of Health     Financial Resource Strain: Not on file   Food Insecurity: Not on file   Transportation Needs: Not on file   Physical Activity: Not on file   Stress: Not on file   Social Connections: Not on file   Intimate Partner Violence: Not on file   Housing Stability: Not on file       Review of Systems   ,         Family History:  Family History   Problem Relation Age of Onset     Cancer Mother         leukemia     Cancer Father         bladder     Cancer Sister         breast with lung met.     Aneurysm Sister      CABG Brother      CABG Brother      Valvular heart disease Brother         valve replacement         Allergies:  Allergies   Allergen Reactions     Adhesive Tape Other (See Comments)     ADHESIVE TAPE; SKIN IRRITATION  Foam tape:  Skin removed with tape removal  Skin pulled off with foam tape       Amiodarone      ADVERSE REACTION.  Sunlight sensitivity.     Lisinopril        Medications:  Current Outpatient Medications   Medication Sig Dispense Refill     acetaminophen (TYLENOL) 325 MG tablet Take 650 mg by mouth 2 times daily       albuterol (PROAIR HFA/PROVENTIL HFA/VENTOLIN HFA) 108 (90 Base) MCG/ACT inhaler Inhale 2 puffs into the lungs every 4 hours as needed for shortness of breath / dyspnea or wheezing       amoxicillin (AMOXIL) 500 MG tablet Take 500 mg by mouth       Ascorbic Acid (VITAMIN C) 500 MG CAPS Take 1,000 mg by mouth daily       atorvastatin (LIPITOR) 40 MG tablet Take 40 mg by mouth daily        budesonide-formoterol (SYMBICORT) 160-4.5 MCG/ACT Inhaler Inhale 2 puffs into the lungs 2 times daily       co-enzyme Q-10 100 MG CAPS capsule Take by mouth daily       fish oil-omega-3 fatty acids 1000 MG capsule Take 2 g by mouth 2 times daily       FLUoxetine (PROZAC) 20 MG capsule TAKE 1 CAPSULE BY MOUTH EVERY DAY       furosemide (LASIX) 40 MG tablet Take 2 tablets (80 mg) by mouth daily 90 tablet 3     furosemide (LASIX) 80 MG tablet Take 1 tablet (80 mg) by mouth daily 30 tablet 6     losartan (COZAAR) 50 MG tablet Take 1 tablet (50 mg) by mouth daily 90 tablet 3     magnesium 250 MG tablet Take 1 tablet by mouth daily       multivitamin, therapeutic (THERA-VIT) TABS tablet Take 1 tablet by mouth daily       OXYGEN-HELIUM IN        polyethylene glycol (MIRALAX) 17 GM/Dose powder Take 17 g by mouth       sotalol (BETAPACE) 160 MG tablet Take 160 mg by mouth 2 times daily       tiotropium (SPIRIVA) 18 MCG inhaled capsule Inhale 18 mcg into the lungs daily       vitamin D2 (ERGOCALCIFEROL) 69285 units (1250 mcg) capsule TAKE 1 CAPSULE BY MOUTH 2 TIMES A WEEK       warfarin ANTICOAGULANT (COUMADIN) 2.5 MG tablet Take 5mg (two tablets) on Monday and Thursday and 2.5mg (one tablet) all other days. Or as directed. 90 tablet 0         Surgical site  Patient is well-healed to right subclavicular region    Device interrogation  Persistent atrial fibrillation;  Good for 6 months  Atrial fib 63 days rate greater than 122%  Ventricular pacing 66%  Lead function satisfactory    Objective:   Vital signs:  /62 (BP Location: Left arm, Patient Position: Sitting, Cuff Size: Adult Large)   Pulse 74   Resp 18   Wt 122.5 kg (270 lb)   SpO2 94%   BMI 33.75 kg/m    Unable to feel radial or brachial pulses  Unable to auscultate blood pressure  With Doppler blood pressure 105 left brachial    Physical Exam:  Pale chronic and acutely ill man  Using nasal oxygen  Alert appropriate      GENERAL APPEARANCE: Alert, cooperative  and in no acute distress.  HEENT: No scleral icterus. No Xanthelasma. Oral mucous membranes pink and moist.  NECK: JVP difficult to assess but no apparent neck vein distention cm. No Hepatojugular reflux. Thyroid not  Palpable  CHEST: clear to auscultation and percussion  CARDIOVASCULAR: S1, S2 without murmur    Brachial, radial  pulses are intact and symmetric.   No carotid bruits noted.  ABDOMEN: Non tender. BS+. No bruits.  EXTREMITIES: No cyanosis, clubbing or edema.    Lab Results:  LIPIDS:  Lab Results   Component Value Date    CHOL 117 09/09/2021    CHOL 116 10/08/2020    CHOL 133 02/04/2020     Lab Results   Component Value Date    HDL 54 09/09/2021    HDL 51 10/08/2020    HDL 52 02/04/2020     No components found for: LDLCALC  Lab Results   Component Value Date    TRIG 43 09/09/2021    TRIG 44 10/08/2020    TRIG 75 02/04/2020     No components found for: CHOLHDL    BMP:  Lab Results   Component Value Date    BUN 30 (H) 10/19/2021     10/19/2021    CO2 25 10/19/2021         This note has been dictated using voice recognition software. Any grammatical or context distortions are unintentional and inherent to the software.  Everett Renee MD  [facility]  [phone]

## 2021-11-18 ENCOUNTER — DOCUMENTATION ONLY (OUTPATIENT)
Dept: CARDIOLOGY | Facility: CLINIC | Age: 76
End: 2021-11-18
Payer: COMMERCIAL

## 2021-11-18 ENCOUNTER — LAB (OUTPATIENT)
Dept: LAB | Facility: CLINIC | Age: 76
End: 2021-11-18
Payer: OTHER MISCELLANEOUS

## 2021-11-18 ENCOUNTER — PREP FOR PROCEDURE (OUTPATIENT)
Dept: CARDIOLOGY | Facility: CLINIC | Age: 76
End: 2021-11-18
Payer: COMMERCIAL

## 2021-11-18 DIAGNOSIS — I48.19 PERSISTENT ATRIAL FIBRILLATION (H): Primary | ICD-10-CM

## 2021-11-18 DIAGNOSIS — I48.19 PERSISTENT ATRIAL FIBRILLATION (H): ICD-10-CM

## 2021-11-18 LAB — SARS-COV-2 RNA RESP QL NAA+PROBE: NEGATIVE

## 2021-11-18 PROCEDURE — U0003 INFECTIOUS AGENT DETECTION BY NUCLEIC ACID (DNA OR RNA); SEVERE ACUTE RESPIRATORY SYNDROME CORONAVIRUS 2 (SARS-COV-2) (CORONAVIRUS DISEASE [COVID-19]), AMPLIFIED PROBE TECHNIQUE, MAKING USE OF HIGH THROUGHPUT TECHNOLOGIES AS DESCRIBED BY CMS-2020-01-R: HCPCS

## 2021-11-18 PROCEDURE — U0005 INFEC AGEN DETEC AMPLI PROBE: HCPCS

## 2021-11-18 RX ORDER — LIDOCAINE 40 MG/G
CREAM TOPICAL
Status: CANCELLED | OUTPATIENT
Start: 2021-11-18

## 2021-11-18 NOTE — PROGRESS NOTES
H&P   PMD []  OV: [x] GAG  Date: 11-17 Teach  [x]  In clinic 11-17 Orders  I [x] P  [x]  Letter [x]  IN clinic 11-17   COVID   FV/EPIC [x]  Date: 11-18  External  []  Date: AC: Warfarin  Continue  [x] AM Meds: Take all [x]   Hold:       Pt reports taking anticoagulation appropriately, denies any missed doses in anticoagulation, pt is aware to call if any missed doses prior to CV and See attached/listed INR values     INR  11-16  2.2  11-9    2.2  11-2    2.4  10-26  2.9  10-19  2.9    1945  Home:[phone] (home) Cell:[phone] (mobile)  Emergency Contact: FELIPE BROWN [phone]    +++Important patient information for CSC/Cath Lab staff : None+++    [facility] EP Cath Lab Procedure Order   Cardioversion:  Cardioversion    Diagnosis:  AF  Anticipated Case Duration:  Standard  Scheduling Needs/Timeframe:  COVID Scheduling- urgent next available  Scheduling Contact: PT has been scheduled for 11-19-21    Current Device: Dual ICD  Device Company/Device Rep Needed for Procedure: Medtronic    Pre-Procedural Testing needed: COVID 19 nasal/lab test within 48hrs of procedure  Anesthesia:  General-CV Only  Research Protocol:  No    [facility] EP Cath Lab Prep   Ordering Provider: Dr Renee  Ordering Date: 11/18/2021  Orders Status: Intial order placed and Order set placed    H&P:  Compled by Dr. Renee on 11-17-21 if scheduled within 30 days, pt to schedule with PMD if procedure outside of this timeframe  PCP: Vivek Guerrero, [phone]    Pre-op Labs: N/A for procedure    Medical Records Pertinent for Procedure:  Echo 11-12-20 and N/A    Patient Education:    Teach with Patient: Complete in the clinic on 11-17-21, and pt provided written pre-procedural instructions    Risks Reviewed:     Cardioversion    >90% acute success rate, <10% failure to convert or   reverts shortly after cardioversion.    <1% embolic event of (CVA, pulmonary embolism, or   1. other site).    75% risk for superficial burn.  Risks associated  with general anesthesia will be addressed by the Anesthesiology Department    Pre-Procedure Instructions that were Reviewed with Patient:  NPO after midnight, Remove all jewelry prior to coming in for procedure, Shower prior to arrival, Notified patient of time and date of procedure by CV , Transportation arrangements needed s/p procedure, Post-procedure follow up process, Sedation plan/orders and Pre-procedure letter was sent to pt by CV     Pre-Procedure Medication Instructions:  Instructions given to pt regarding anticoagulants: Coumadin- instructed to continue anticoagulation uninterrupted through their procedure  Instructions given to pt regarding antiarrhythmic medication: Sotalol; Pt instructed to continue medication prior to procedure  Instructions for medication, other than anticoagulants/antiarrhythmics listed above, given to pt: to take all morning medications with small sips of water, with the exception of OTC supplements and MVI    Allergies   Allergen Reactions     Adhesive Tape Other (See Comments)     ADHESIVE TAPE; SKIN IRRITATION  Foam tape:  Skin removed with tape removal  Skin pulled off with foam tape       Amiodarone      ADVERSE REACTION.  Sunlight sensitivity.     Lisinopril        Current Outpatient Medications:      acetaminophen (TYLENOL) 325 MG tablet, Take 650 mg by mouth 2 times daily, Disp: , Rfl:      albuterol (PROAIR HFA/PROVENTIL HFA/VENTOLIN HFA) 108 (90 Base) MCG/ACT inhaler, Inhale 2 puffs into the lungs every 4 hours as needed for shortness of breath / dyspnea or wheezing, Disp: , Rfl:      amoxicillin (AMOXIL) 500 MG tablet, Take 500 mg by mouth, Disp: , Rfl:      Ascorbic Acid (VITAMIN C) 500 MG CAPS, Take 1,000 mg by mouth daily, Disp: , Rfl:      atorvastatin (LIPITOR) 40 MG tablet, Take 40 mg by mouth daily, Disp: , Rfl:      budesonide-formoterol (SYMBICORT) 160-4.5 MCG/ACT Inhaler, Inhale 2 puffs into the lungs 2 times daily, Disp: , Rfl:      co-enzyme  Q-10 100 MG CAPS capsule, Take by mouth daily, Disp: , Rfl:      fish oil-omega-3 fatty acids 1000 MG capsule, Take 2 g by mouth 2 times daily, Disp: , Rfl:      FLUoxetine (PROZAC) 20 MG capsule, TAKE 1 CAPSULE BY MOUTH EVERY DAY, Disp: , Rfl:      furosemide (LASIX) 40 MG tablet, Take 2 tablets (80 mg) by mouth daily, Disp: 90 tablet, Rfl: 3     furosemide (LASIX) 80 MG tablet, Take 1 tablet (80 mg) by mouth daily, Disp: 30 tablet, Rfl: 6     losartan (COZAAR) 50 MG tablet, Take 1 tablet (50 mg) by mouth daily, Disp: 90 tablet, Rfl: 3     magnesium 250 MG tablet, Take 1 tablet by mouth daily, Disp: , Rfl:      multivitamin, therapeutic (THERA-VIT) TABS tablet, Take 1 tablet by mouth daily, Disp: , Rfl:      OXYGEN-HELIUM IN, , Disp: , Rfl:      polyethylene glycol (MIRALAX) 17 GM/Dose powder, Take 17 g by mouth, Disp: , Rfl:      sotalol (BETAPACE) 160 MG tablet, Take 160 mg by mouth 2 times daily, Disp: , Rfl:      tiotropium (SPIRIVA) 18 MCG inhaled capsule, Inhale 18 mcg into the lungs daily, Disp: , Rfl:      vitamin D2 (ERGOCALCIFEROL) 91774 units (1250 mcg) capsule, TAKE 1 CAPSULE BY MOUTH 2 TIMES A WEEK, Disp: , Rfl:      warfarin ANTICOAGULANT (COUMADIN) 2.5 MG tablet, Take 5mg (two tablets) on Monday and Thursday and 2.5mg (one tablet) all other days. Or as directed., Disp: 90 tablet, Rfl: 0    Documentation Date:11/18/2021 9:29 AM  Sharlene Wick RN

## 2021-11-19 ENCOUNTER — ANESTHESIA (OUTPATIENT)
Dept: CARDIOLOGY | Facility: HOSPITAL | Age: 76
End: 2021-11-19
Payer: OTHER MISCELLANEOUS

## 2021-11-19 ENCOUNTER — ANESTHESIA EVENT (OUTPATIENT)
Dept: CARDIOLOGY | Facility: HOSPITAL | Age: 76
End: 2021-11-19
Payer: OTHER MISCELLANEOUS

## 2021-11-19 ENCOUNTER — HOSPITAL ENCOUNTER (OUTPATIENT)
Dept: CARDIOLOGY | Facility: HOSPITAL | Age: 76
Discharge: HOME | End: 2021-11-19
Attending: INTERNAL MEDICINE | Admitting: NURSE PRACTITIONER
Payer: OTHER MISCELLANEOUS

## 2021-11-19 ENCOUNTER — DOCUMENTATION ONLY (OUTPATIENT)
Dept: ANTICOAGULATION | Facility: CLINIC | Age: 76
End: 2021-11-19
Payer: COMMERCIAL

## 2021-11-19 VITALS
WEIGHT: 269.5 LBS | SYSTOLIC BLOOD PRESSURE: 92 MMHG | RESPIRATION RATE: 16 BRPM | HEIGHT: 75 IN | TEMPERATURE: 97.6 F | OXYGEN SATURATION: 100 % | HEART RATE: 60 BPM | DIASTOLIC BLOOD PRESSURE: 57 MMHG | BODY MASS INDEX: 33.51 KG/M2

## 2021-11-19 DIAGNOSIS — I48.19 PERSISTENT ATRIAL FIBRILLATION (H): ICD-10-CM

## 2021-11-19 LAB
ATRIAL RATE - MUSE: 59 BPM
DIASTOLIC BLOOD PRESSURE - MUSE: NORMAL MMHG
INR BLD: 2.4 (ref 2–3)
INTERPRETATION ECG - MUSE: NORMAL
MDC_IDC_EPISODE_DTM: NORMAL
MDC_IDC_EPISODE_DURATION: 10 S
MDC_IDC_EPISODE_DURATION: 10 S
MDC_IDC_EPISODE_DURATION: 100 S
MDC_IDC_EPISODE_DURATION: 1029 S
MDC_IDC_EPISODE_DURATION: 1061 S
MDC_IDC_EPISODE_DURATION: 1089 S
MDC_IDC_EPISODE_DURATION: 114 S
MDC_IDC_EPISODE_DURATION: 1141 S
MDC_IDC_EPISODE_DURATION: 1165 S
MDC_IDC_EPISODE_DURATION: 1167 S
MDC_IDC_EPISODE_DURATION: 1171 S
MDC_IDC_EPISODE_DURATION: 1212 S
MDC_IDC_EPISODE_DURATION: 1239 S
MDC_IDC_EPISODE_DURATION: 1243 S
MDC_IDC_EPISODE_DURATION: 1270 S
MDC_IDC_EPISODE_DURATION: 13 S
MDC_IDC_EPISODE_DURATION: 13 S
MDC_IDC_EPISODE_DURATION: 1483 S
MDC_IDC_EPISODE_DURATION: 1512 S
MDC_IDC_EPISODE_DURATION: 1525 S
MDC_IDC_EPISODE_DURATION: 1536 S
MDC_IDC_EPISODE_DURATION: 1593 S
MDC_IDC_EPISODE_DURATION: 16 S
MDC_IDC_EPISODE_DURATION: 1728 S
MDC_IDC_EPISODE_DURATION: 1748 S
MDC_IDC_EPISODE_DURATION: 18 S
MDC_IDC_EPISODE_DURATION: 189 S
MDC_IDC_EPISODE_DURATION: 19 S
MDC_IDC_EPISODE_DURATION: 1975 S
MDC_IDC_EPISODE_DURATION: 198 S
MDC_IDC_EPISODE_DURATION: 2132 S
MDC_IDC_EPISODE_DURATION: 2142 S
MDC_IDC_EPISODE_DURATION: 22 S
MDC_IDC_EPISODE_DURATION: 2326 S
MDC_IDC_EPISODE_DURATION: 2464 S
MDC_IDC_EPISODE_DURATION: 2491 S
MDC_IDC_EPISODE_DURATION: 2652 S
MDC_IDC_EPISODE_DURATION: 27 S
MDC_IDC_EPISODE_DURATION: 27 S
MDC_IDC_EPISODE_DURATION: 28 S
MDC_IDC_EPISODE_DURATION: 2817 S
MDC_IDC_EPISODE_DURATION: 2859 S
MDC_IDC_EPISODE_DURATION: 293 S
MDC_IDC_EPISODE_DURATION: 296 S
MDC_IDC_EPISODE_DURATION: 2980 S
MDC_IDC_EPISODE_DURATION: 30 S
MDC_IDC_EPISODE_DURATION: 30 S
MDC_IDC_EPISODE_DURATION: 3028 S
MDC_IDC_EPISODE_DURATION: 31 S
MDC_IDC_EPISODE_DURATION: 3230 S
MDC_IDC_EPISODE_DURATION: 333 S
MDC_IDC_EPISODE_DURATION: 3425 S
MDC_IDC_EPISODE_DURATION: 345 S
MDC_IDC_EPISODE_DURATION: 35 S
MDC_IDC_EPISODE_DURATION: 37 S
MDC_IDC_EPISODE_DURATION: 37 S
MDC_IDC_EPISODE_DURATION: 374 S
MDC_IDC_EPISODE_DURATION: 379 S
MDC_IDC_EPISODE_DURATION: 385 S
MDC_IDC_EPISODE_DURATION: 3871 S
MDC_IDC_EPISODE_DURATION: 39 S
MDC_IDC_EPISODE_DURATION: 3978 S
MDC_IDC_EPISODE_DURATION: 4 S
MDC_IDC_EPISODE_DURATION: 41 S
MDC_IDC_EPISODE_DURATION: 412 S
MDC_IDC_EPISODE_DURATION: 42 S
MDC_IDC_EPISODE_DURATION: 4224 S
MDC_IDC_EPISODE_DURATION: 429 S
MDC_IDC_EPISODE_DURATION: 43 S
MDC_IDC_EPISODE_DURATION: 43 S
MDC_IDC_EPISODE_DURATION: 44 S
MDC_IDC_EPISODE_DURATION: 444 S
MDC_IDC_EPISODE_DURATION: 46 S
MDC_IDC_EPISODE_DURATION: 46 S
MDC_IDC_EPISODE_DURATION: 4637 S
MDC_IDC_EPISODE_DURATION: 4660 S
MDC_IDC_EPISODE_DURATION: 47 S
MDC_IDC_EPISODE_DURATION: 48 S
MDC_IDC_EPISODE_DURATION: 481 S
MDC_IDC_EPISODE_DURATION: 4918 S
MDC_IDC_EPISODE_DURATION: 50 S
MDC_IDC_EPISODE_DURATION: 5056 S
MDC_IDC_EPISODE_DURATION: 51 S
MDC_IDC_EPISODE_DURATION: 52 S
MDC_IDC_EPISODE_DURATION: 5244 S
MDC_IDC_EPISODE_DURATION: 527 S
MDC_IDC_EPISODE_DURATION: 53 S
MDC_IDC_EPISODE_DURATION: 53 S
MDC_IDC_EPISODE_DURATION: 54 S
MDC_IDC_EPISODE_DURATION: 5566 S
MDC_IDC_EPISODE_DURATION: 565 S
MDC_IDC_EPISODE_DURATION: 58 S
MDC_IDC_EPISODE_DURATION: 585 S
MDC_IDC_EPISODE_DURATION: 5889 S
MDC_IDC_EPISODE_DURATION: 5912 S
MDC_IDC_EPISODE_DURATION: 605 S
MDC_IDC_EPISODE_DURATION: 61 S
MDC_IDC_EPISODE_DURATION: 63 S
MDC_IDC_EPISODE_DURATION: 64 S
MDC_IDC_EPISODE_DURATION: 66 S
MDC_IDC_EPISODE_DURATION: 66 S
MDC_IDC_EPISODE_DURATION: 6718 S
MDC_IDC_EPISODE_DURATION: 681 S
MDC_IDC_EPISODE_DURATION: 689 S
MDC_IDC_EPISODE_DURATION: 71 S
MDC_IDC_EPISODE_DURATION: 72 S
MDC_IDC_EPISODE_DURATION: 7210 S
MDC_IDC_EPISODE_DURATION: 7211 S
MDC_IDC_EPISODE_DURATION: 7238 S
MDC_IDC_EPISODE_DURATION: 73 S
MDC_IDC_EPISODE_DURATION: 7328 S
MDC_IDC_EPISODE_DURATION: 7330 S
MDC_IDC_EPISODE_DURATION: 7345 S
MDC_IDC_EPISODE_DURATION: 7618 S
MDC_IDC_EPISODE_DURATION: 77 S
MDC_IDC_EPISODE_DURATION: 7805 S
MDC_IDC_EPISODE_DURATION: 7868 S
MDC_IDC_EPISODE_DURATION: 79 S
MDC_IDC_EPISODE_DURATION: 805 S
MDC_IDC_EPISODE_DURATION: 8228 S
MDC_IDC_EPISODE_DURATION: 826 S
MDC_IDC_EPISODE_DURATION: 83 S
MDC_IDC_EPISODE_DURATION: 8373 S
MDC_IDC_EPISODE_DURATION: 840 S
MDC_IDC_EPISODE_DURATION: 843 S
MDC_IDC_EPISODE_DURATION: 85 S
MDC_IDC_EPISODE_DURATION: 871 S
MDC_IDC_EPISODE_DURATION: 875 S
MDC_IDC_EPISODE_DURATION: 90 S
MDC_IDC_EPISODE_DURATION: NORMAL S
MDC_IDC_EPISODE_ID: 1000
MDC_IDC_EPISODE_ID: 1001
MDC_IDC_EPISODE_ID: 1002
MDC_IDC_EPISODE_ID: 1003
MDC_IDC_EPISODE_ID: 1004
MDC_IDC_EPISODE_ID: 1005
MDC_IDC_EPISODE_ID: 1006
MDC_IDC_EPISODE_ID: 1007
MDC_IDC_EPISODE_ID: 1008
MDC_IDC_EPISODE_ID: 1009
MDC_IDC_EPISODE_ID: 1010
MDC_IDC_EPISODE_ID: 1011
MDC_IDC_EPISODE_ID: 1012
MDC_IDC_EPISODE_ID: 1013
MDC_IDC_EPISODE_ID: 1014
MDC_IDC_EPISODE_ID: 1015
MDC_IDC_EPISODE_ID: 1016
MDC_IDC_EPISODE_ID: 1017
MDC_IDC_EPISODE_ID: 1018
MDC_IDC_EPISODE_ID: 1019
MDC_IDC_EPISODE_ID: 1020
MDC_IDC_EPISODE_ID: 1021
MDC_IDC_EPISODE_ID: 1022
MDC_IDC_EPISODE_ID: 1023
MDC_IDC_EPISODE_ID: 1024
MDC_IDC_EPISODE_ID: 1025
MDC_IDC_EPISODE_ID: 1026
MDC_IDC_EPISODE_ID: 1027
MDC_IDC_EPISODE_ID: 1028
MDC_IDC_EPISODE_ID: 1029
MDC_IDC_EPISODE_ID: 1030
MDC_IDC_EPISODE_ID: 1031
MDC_IDC_EPISODE_ID: 1032
MDC_IDC_EPISODE_ID: 1033
MDC_IDC_EPISODE_ID: 1034
MDC_IDC_EPISODE_ID: 1035
MDC_IDC_EPISODE_ID: 1036
MDC_IDC_EPISODE_ID: 1037
MDC_IDC_EPISODE_ID: 1038
MDC_IDC_EPISODE_ID: 1039
MDC_IDC_EPISODE_ID: 1040
MDC_IDC_EPISODE_ID: 1041
MDC_IDC_EPISODE_ID: 1042
MDC_IDC_EPISODE_ID: 1043
MDC_IDC_EPISODE_ID: 1044
MDC_IDC_EPISODE_ID: 1045
MDC_IDC_EPISODE_ID: 1046
MDC_IDC_EPISODE_ID: 1047
MDC_IDC_EPISODE_ID: 1048
MDC_IDC_EPISODE_ID: 1049
MDC_IDC_EPISODE_ID: 1050
MDC_IDC_EPISODE_ID: 1051
MDC_IDC_EPISODE_ID: 1052
MDC_IDC_EPISODE_ID: 1053
MDC_IDC_EPISODE_ID: 1054
MDC_IDC_EPISODE_ID: 1055
MDC_IDC_EPISODE_ID: 1056
MDC_IDC_EPISODE_ID: 1057
MDC_IDC_EPISODE_ID: 1058
MDC_IDC_EPISODE_ID: 1059
MDC_IDC_EPISODE_ID: 1060
MDC_IDC_EPISODE_ID: 1061
MDC_IDC_EPISODE_ID: 1062
MDC_IDC_EPISODE_ID: 1063
MDC_IDC_EPISODE_ID: 1064
MDC_IDC_EPISODE_ID: 1065
MDC_IDC_EPISODE_ID: 1066
MDC_IDC_EPISODE_ID: 477
MDC_IDC_EPISODE_ID: 491
MDC_IDC_EPISODE_ID: 492
MDC_IDC_EPISODE_ID: 499
MDC_IDC_EPISODE_ID: 505
MDC_IDC_EPISODE_ID: 534
MDC_IDC_EPISODE_ID: 539
MDC_IDC_EPISODE_ID: 575
MDC_IDC_EPISODE_ID: 579
MDC_IDC_EPISODE_ID: 585
MDC_IDC_EPISODE_ID: 588
MDC_IDC_EPISODE_ID: 590
MDC_IDC_EPISODE_ID: 592
MDC_IDC_EPISODE_ID: 603
MDC_IDC_EPISODE_ID: 612
MDC_IDC_EPISODE_ID: 613
MDC_IDC_EPISODE_ID: 615
MDC_IDC_EPISODE_ID: 617
MDC_IDC_EPISODE_ID: 640
MDC_IDC_EPISODE_ID: 646
MDC_IDC_EPISODE_ID: 652
MDC_IDC_EPISODE_ID: 677
MDC_IDC_EPISODE_ID: 690
MDC_IDC_EPISODE_ID: 692
MDC_IDC_EPISODE_ID: 694
MDC_IDC_EPISODE_ID: 711
MDC_IDC_EPISODE_ID: 733
MDC_IDC_EPISODE_ID: 738
MDC_IDC_EPISODE_ID: 747
MDC_IDC_EPISODE_ID: 761
MDC_IDC_EPISODE_ID: 778
MDC_IDC_EPISODE_ID: 779
MDC_IDC_EPISODE_ID: 792
MDC_IDC_EPISODE_ID: 802
MDC_IDC_EPISODE_ID: 810
MDC_IDC_EPISODE_ID: 821
MDC_IDC_EPISODE_ID: 880
MDC_IDC_EPISODE_ID: 910
MDC_IDC_EPISODE_ID: 911
MDC_IDC_EPISODE_ID: 914
MDC_IDC_EPISODE_ID: 930
MDC_IDC_EPISODE_ID: 941
MDC_IDC_EPISODE_ID: 955
MDC_IDC_EPISODE_ID: 959
MDC_IDC_EPISODE_ID: 960
MDC_IDC_EPISODE_ID: 961
MDC_IDC_EPISODE_ID: 962
MDC_IDC_EPISODE_ID: 963
MDC_IDC_EPISODE_ID: 964
MDC_IDC_EPISODE_ID: 965
MDC_IDC_EPISODE_ID: 966
MDC_IDC_EPISODE_ID: 967
MDC_IDC_EPISODE_ID: 968
MDC_IDC_EPISODE_ID: 969
MDC_IDC_EPISODE_ID: 970
MDC_IDC_EPISODE_ID: 971
MDC_IDC_EPISODE_ID: 972
MDC_IDC_EPISODE_ID: 973
MDC_IDC_EPISODE_ID: 974
MDC_IDC_EPISODE_ID: 975
MDC_IDC_EPISODE_ID: 976
MDC_IDC_EPISODE_ID: 977
MDC_IDC_EPISODE_ID: 978
MDC_IDC_EPISODE_ID: 979
MDC_IDC_EPISODE_ID: 980
MDC_IDC_EPISODE_ID: 981
MDC_IDC_EPISODE_ID: 982
MDC_IDC_EPISODE_ID: 983
MDC_IDC_EPISODE_ID: 984
MDC_IDC_EPISODE_ID: 985
MDC_IDC_EPISODE_ID: 986
MDC_IDC_EPISODE_ID: 987
MDC_IDC_EPISODE_ID: 988
MDC_IDC_EPISODE_ID: 989
MDC_IDC_EPISODE_ID: 990
MDC_IDC_EPISODE_ID: 991
MDC_IDC_EPISODE_ID: 992
MDC_IDC_EPISODE_ID: 993
MDC_IDC_EPISODE_ID: 994
MDC_IDC_EPISODE_ID: 995
MDC_IDC_EPISODE_ID: 996
MDC_IDC_EPISODE_ID: 997
MDC_IDC_EPISODE_ID: 998
MDC_IDC_EPISODE_ID: 999
MDC_IDC_EPISODE_TYPE: NORMAL
MDC_IDC_LEAD_IMPLANT_DT: NORMAL
MDC_IDC_LEAD_IMPLANT_DT: NORMAL
MDC_IDC_LEAD_LOCATION: NORMAL
MDC_IDC_LEAD_LOCATION: NORMAL
MDC_IDC_LEAD_LOCATION_DETAIL_1: NORMAL
MDC_IDC_LEAD_LOCATION_DETAIL_1: NORMAL
MDC_IDC_LEAD_MFG: NORMAL
MDC_IDC_LEAD_MFG: NORMAL
MDC_IDC_LEAD_MODEL: NORMAL
MDC_IDC_LEAD_MODEL: NORMAL
MDC_IDC_LEAD_POLARITY_TYPE: NORMAL
MDC_IDC_LEAD_POLARITY_TYPE: NORMAL
MDC_IDC_LEAD_SERIAL: NORMAL
MDC_IDC_LEAD_SERIAL: NORMAL
MDC_IDC_MSMT_BATTERY_DTM: NORMAL
MDC_IDC_MSMT_BATTERY_REMAINING_LONGEVITY: 7 MO
MDC_IDC_MSMT_BATTERY_RRT_TRIGGER: 2.73
MDC_IDC_MSMT_BATTERY_STATUS: NORMAL
MDC_IDC_MSMT_BATTERY_VOLTAGE: 2.83 V
MDC_IDC_MSMT_CAP_CHARGE_DTM: NORMAL
MDC_IDC_MSMT_CAP_CHARGE_ENERGY: 18 J
MDC_IDC_MSMT_CAP_CHARGE_TIME: 4.5
MDC_IDC_MSMT_CAP_CHARGE_TYPE: NORMAL
MDC_IDC_MSMT_LEADCHNL_RA_IMPEDANCE_VALUE: 399 OHM
MDC_IDC_MSMT_LEADCHNL_RA_PACING_THRESHOLD_AMPLITUDE: 1.25 V
MDC_IDC_MSMT_LEADCHNL_RA_PACING_THRESHOLD_PULSEWIDTH: 0.4 MS
MDC_IDC_MSMT_LEADCHNL_RA_SENSING_INTR_AMPL: 0.12 MV
MDC_IDC_MSMT_LEADCHNL_RA_SENSING_INTR_AMPL: 0.25 MV
MDC_IDC_MSMT_LEADCHNL_RV_IMPEDANCE_VALUE: 342 OHM
MDC_IDC_MSMT_LEADCHNL_RV_IMPEDANCE_VALUE: 418 OHM
MDC_IDC_MSMT_LEADCHNL_RV_PACING_THRESHOLD_AMPLITUDE: 0.5 V
MDC_IDC_MSMT_LEADCHNL_RV_PACING_THRESHOLD_AMPLITUDE: 0.5 V
MDC_IDC_MSMT_LEADCHNL_RV_PACING_THRESHOLD_PULSEWIDTH: 0.4 MS
MDC_IDC_MSMT_LEADCHNL_RV_PACING_THRESHOLD_PULSEWIDTH: 0.4 MS
MDC_IDC_MSMT_LEADCHNL_RV_SENSING_INTR_AMPL: 12.25 MV
MDC_IDC_MSMT_LEADCHNL_RV_SENSING_INTR_AMPL: 9.5 MV
MDC_IDC_PG_IMPLANT_DTM: NORMAL
MDC_IDC_PG_MFG: NORMAL
MDC_IDC_PG_MODEL: NORMAL
MDC_IDC_PG_SERIAL: NORMAL
MDC_IDC_PG_TYPE: NORMAL
MDC_IDC_SESS_CLINIC_NAME: NORMAL
MDC_IDC_SESS_DTM: NORMAL
MDC_IDC_SESS_TYPE: NORMAL
MDC_IDC_SET_BRADY_AT_MODE_SWITCH_RATE: 171 {BEATS}/MIN
MDC_IDC_SET_BRADY_HYSTRATE: NORMAL
MDC_IDC_SET_BRADY_LOWRATE: 60 {BEATS}/MIN
MDC_IDC_SET_BRADY_MAX_SENSOR_RATE: 120 {BEATS}/MIN
MDC_IDC_SET_BRADY_MAX_TRACKING_RATE: 130 {BEATS}/MIN
MDC_IDC_SET_BRADY_MODE: NORMAL
MDC_IDC_SET_BRADY_PAV_DELAY_LOW: 340 MS
MDC_IDC_SET_BRADY_SAV_DELAY_LOW: 340 MS
MDC_IDC_SET_LEADCHNL_RA_PACING_AMPLITUDE: NORMAL
MDC_IDC_SET_LEADCHNL_RA_PACING_ANODE_ELECTRODE_1: NORMAL
MDC_IDC_SET_LEADCHNL_RA_PACING_ANODE_LOCATION_1: NORMAL
MDC_IDC_SET_LEADCHNL_RA_PACING_CAPTURE_MODE: NORMAL
MDC_IDC_SET_LEADCHNL_RA_PACING_CATHODE_ELECTRODE_1: NORMAL
MDC_IDC_SET_LEADCHNL_RA_PACING_CATHODE_LOCATION_1: NORMAL
MDC_IDC_SET_LEADCHNL_RA_PACING_POLARITY: NORMAL
MDC_IDC_SET_LEADCHNL_RA_PACING_PULSEWIDTH: 0.4 MS
MDC_IDC_SET_LEADCHNL_RA_SENSING_ANODE_ELECTRODE_1: NORMAL
MDC_IDC_SET_LEADCHNL_RA_SENSING_ANODE_LOCATION_1: NORMAL
MDC_IDC_SET_LEADCHNL_RA_SENSING_CATHODE_ELECTRODE_1: NORMAL
MDC_IDC_SET_LEADCHNL_RA_SENSING_CATHODE_LOCATION_1: NORMAL
MDC_IDC_SET_LEADCHNL_RA_SENSING_POLARITY: NORMAL
MDC_IDC_SET_LEADCHNL_RA_SENSING_SENSITIVITY: 0.15 MV
MDC_IDC_SET_LEADCHNL_RV_PACING_AMPLITUDE: NORMAL
MDC_IDC_SET_LEADCHNL_RV_PACING_ANODE_ELECTRODE_1: NORMAL
MDC_IDC_SET_LEADCHNL_RV_PACING_ANODE_LOCATION_1: NORMAL
MDC_IDC_SET_LEADCHNL_RV_PACING_CAPTURE_MODE: NORMAL
MDC_IDC_SET_LEADCHNL_RV_PACING_CATHODE_ELECTRODE_1: NORMAL
MDC_IDC_SET_LEADCHNL_RV_PACING_CATHODE_LOCATION_1: NORMAL
MDC_IDC_SET_LEADCHNL_RV_PACING_POLARITY: NORMAL
MDC_IDC_SET_LEADCHNL_RV_PACING_PULSEWIDTH: 0.4 MS
MDC_IDC_SET_LEADCHNL_RV_SENSING_ANODE_ELECTRODE_1: NORMAL
MDC_IDC_SET_LEADCHNL_RV_SENSING_ANODE_LOCATION_1: NORMAL
MDC_IDC_SET_LEADCHNL_RV_SENSING_CATHODE_ELECTRODE_1: NORMAL
MDC_IDC_SET_LEADCHNL_RV_SENSING_CATHODE_LOCATION_1: NORMAL
MDC_IDC_SET_LEADCHNL_RV_SENSING_POLARITY: NORMAL
MDC_IDC_SET_LEADCHNL_RV_SENSING_SENSITIVITY: 0.45 MV
MDC_IDC_SET_ZONE_DETECTION_BEATS_DENOMINATOR: 32 {BEATS}
MDC_IDC_SET_ZONE_DETECTION_BEATS_NUMERATOR: 24 {BEATS}
MDC_IDC_SET_ZONE_DETECTION_INTERVAL: 200 MS
MDC_IDC_SET_ZONE_DETECTION_INTERVAL: 240 MS
MDC_IDC_SET_ZONE_DETECTION_INTERVAL: 320 MS
MDC_IDC_SET_ZONE_DETECTION_INTERVAL: 350 MS
MDC_IDC_SET_ZONE_DETECTION_INTERVAL: 360 MS
MDC_IDC_SET_ZONE_DETECTION_INTERVAL: 360 MS
MDC_IDC_SET_ZONE_TYPE: NORMAL
MDC_IDC_STAT_AT_BURDEN_PERCENT: 38.1 %
MDC_IDC_STAT_AT_DTM_END: NORMAL
MDC_IDC_STAT_AT_DTM_START: NORMAL
MDC_IDC_STAT_AT_MODE_SW_COUNT: 907
MDC_IDC_STAT_BRADY_AP_VP_PERCENT: 47.95 %
MDC_IDC_STAT_BRADY_AP_VS_PERCENT: 13.53 %
MDC_IDC_STAT_BRADY_AS_VP_PERCENT: 18.04 %
MDC_IDC_STAT_BRADY_AS_VS_PERCENT: 20.48 %
MDC_IDC_STAT_BRADY_DTM_END: NORMAL
MDC_IDC_STAT_BRADY_DTM_START: NORMAL
MDC_IDC_STAT_BRADY_RA_PERCENT_PACED: 55.83 %
MDC_IDC_STAT_BRADY_RV_PERCENT_PACED: 66.21 %
MDC_IDC_STAT_EPISODE_RECENT_COUNT: 0
MDC_IDC_STAT_EPISODE_RECENT_COUNT: 907
MDC_IDC_STAT_EPISODE_RECENT_COUNT_DTM_END: NORMAL
MDC_IDC_STAT_EPISODE_RECENT_COUNT_DTM_START: NORMAL
MDC_IDC_STAT_EPISODE_TOTAL_COUNT: 0
MDC_IDC_STAT_EPISODE_TOTAL_COUNT: 1
MDC_IDC_STAT_EPISODE_TOTAL_COUNT: 2005
MDC_IDC_STAT_EPISODE_TOTAL_COUNT: 6
MDC_IDC_STAT_EPISODE_TOTAL_COUNT_DTM_END: NORMAL
MDC_IDC_STAT_EPISODE_TOTAL_COUNT_DTM_START: NORMAL
MDC_IDC_STAT_EPISODE_TYPE: NORMAL
MDC_IDC_STAT_TACHYTHERAPY_ATP_DELIVERED_RECENT: 0
MDC_IDC_STAT_TACHYTHERAPY_ATP_DELIVERED_TOTAL: 0
MDC_IDC_STAT_TACHYTHERAPY_RECENT_DTM_END: NORMAL
MDC_IDC_STAT_TACHYTHERAPY_RECENT_DTM_START: NORMAL
MDC_IDC_STAT_TACHYTHERAPY_SHOCKS_ABORTED_RECENT: 0
MDC_IDC_STAT_TACHYTHERAPY_SHOCKS_ABORTED_TOTAL: 0
MDC_IDC_STAT_TACHYTHERAPY_SHOCKS_DELIVERED_RECENT: 0
MDC_IDC_STAT_TACHYTHERAPY_SHOCKS_DELIVERED_TOTAL: 2
MDC_IDC_STAT_TACHYTHERAPY_TOTAL_DTM_END: NORMAL
MDC_IDC_STAT_TACHYTHERAPY_TOTAL_DTM_START: NORMAL
P AXIS - MUSE: NORMAL DEGREES
POTASSIUM BLD-SCNC: 4.9 MMOL/L (ref 3.5–5)
PR INTERVAL - MUSE: NORMAL MS
QRS DURATION - MUSE: 114 MS
QT - MUSE: 468 MS
QTC - MUSE: 486 MS
R AXIS - MUSE: 91 DEGREES
SYSTOLIC BLOOD PRESSURE - MUSE: NORMAL MMHG
T AXIS - MUSE: 177 DEGREES
VENTRICULAR RATE- MUSE: 65 BPM

## 2021-11-19 PROCEDURE — 84132 ASSAY OF SERUM POTASSIUM: CPT | Performed by: INTERNAL MEDICINE

## 2021-11-19 PROCEDURE — 93010 ELECTROCARDIOGRAM REPORT: CPT | Performed by: INTERNAL MEDICINE

## 2021-11-19 PROCEDURE — 92960 CARDIOVERSION ELECTRIC EXT: CPT | Performed by: NURSE PRACTITIONER

## 2021-11-19 PROCEDURE — 370N000017 HC ANESTHESIA TECHNICAL FEE, PER MIN

## 2021-11-19 PROCEDURE — 93005 ELECTROCARDIOGRAM TRACING: CPT

## 2021-11-19 PROCEDURE — 999N000054 HC STATISTIC EKG NON-CHARGEABLE

## 2021-11-19 PROCEDURE — 92960 CARDIOVERSION ELECTRIC EXT: CPT

## 2021-11-19 PROCEDURE — 250N000009 HC RX 250: Performed by: NURSE ANESTHETIST, CERTIFIED REGISTERED

## 2021-11-19 PROCEDURE — 36415 COLL VENOUS BLD VENIPUNCTURE: CPT | Performed by: INTERNAL MEDICINE

## 2021-11-19 RX ORDER — LIDOCAINE 40 MG/G
CREAM TOPICAL
Status: DISCONTINUED | OUTPATIENT
Start: 2021-11-19 | End: 2021-11-19 | Stop reason: HOSPADM

## 2021-11-19 RX ADMIN — METHOHEXITAL SODIUM 80 MG: 500 INJECTION, POWDER, LYOPHILIZED, FOR SOLUTION INTRAMUSCULAR; INTRAVENOUS; RECTAL at 10:15

## 2021-11-19 ASSESSMENT — ENCOUNTER SYMPTOMS: DYSRHYTHMIAS: 1

## 2021-11-19 ASSESSMENT — COPD QUESTIONNAIRES: COPD: 1

## 2021-11-19 ASSESSMENT — MIFFLIN-ST. JEOR: SCORE: 2038.07

## 2021-11-19 NOTE — ANESTHESIA CARE TRANSFER NOTE
Patient: Buck Sinclair    Procedure: * No procedures listed *  Cardioversion External    Diagnosis: * No pre-op diagnosis entered *  Diagnosis Additional Information: No value filed.    Anesthesia Type:   General     Note:    Oropharynx: spontaneously breathing  Level of Consciousness: drowsy  Oxygen Supplementation: face mask    Independent Airway: airway patency satisfactory and stable  Dentition: dentition unchanged  Vital Signs Stable: post-procedure vital signs reviewed and stable  Report to RN Given: handoff report given  Patient transferred to: Cardiac Special Care  Comments: Pt. Pink and breathing spontaneously.  Vitals stable.  Report given to oncoming nurse.  Transfer of care occurred.         Vitals:  Vitals Value Taken Time   /64 11/19/21 1024   Temp     Pulse 43 11/19/21 1024   Resp 11 11/19/21 1024   SpO2 99 % 11/19/21 1024   Vitals shown include unvalidated device data.    Electronically Signed By: GIBRAN Bose CRNA  November 19, 2021  10:25 AM

## 2021-11-19 NOTE — ANESTHESIA PREPROCEDURE EVALUATION
Anesthesia Pre-Procedure Evaluation    Patient: Buck Sinclair   MRN: [id number] : 1945        Preoperative Diagnosis: * No pre-op diagnosis entered *    Procedure : * No procedures listed *  Cardioversion External       Past Medical History:   Diagnosis Date     Anemia      Asthma without status asthmaticus 2021     BPH (benign prostatic hyperplasia)      Cardiomyopathy (H)      COPD, group B, by GOLD 2017 classification (H)      Coronary artery disease due to calcified coronary lesion      Dyslipidemia, goal LDL below 70      Essential hypertension      History of transfusion      Persistent atrial fibrillation (H)      Pneumonia of left lower lobe due to infectious organism 10/4/2017     Skin cancer of trunk      Status post catheter ablation of atrial fibrillation 2017    PVI  (Cryo/PVI + roof line + CTI line) Re-do PVI  (RFA/PVI + CFE + VIDYA + confirmed CTI line)     Ventricular tachycardia (H)       Past Surgical History:   Procedure Laterality Date     C MYERS W/O FACETEC FORAMOT/DSKC  VRT SEG, CERVICAL      Laminectomy Lumbar;  Recorded: 2012;     CARDIAC DEFIBRILLATOR PLACEMENT       CARDIOVERSION  07/11/2018    x20, last 2/12/15, 10/2015, 16, 17 by Lauren Foster CNP     CARDIOVERSION  2018     CARDIOVERSION  2021     COLONOSCOPY N/A 2017    Procedure: COLONOSCOPY with 2 ascending polyps and 1 transverse polyp;  Surgeon: Jose Whittington MD;  Location: [facility] GI;  Service:      CV CORONARY ANGIOGRAM N/A 2017    Procedure: Coronary Angiogram;  Surgeon: Sergio Cervantes MD;  Location: [facility] Lab;  Service:      EP ICD INSERT       FRACTURE SURGERY Left     wrist     INGUINAL HERNIA REPAIR Left     while in the [employer] in [employer] after 13 month in [place]     INSERT / REPLACE / REMOVE PACEMAKER       IR MISCELLANEOUS PROCEDURE  2014     OTHER SURGICAL HISTORY      left hand surgery---tendon repair     LA ABLATE HEART  DYSRHYTHM FOCUS  2011    Catheter Ablation Atrial Fibrillation PVI 2011 (Cryo+RF-PVI + roof line + CTI line)     RI ABLATE HEART DYSRHYTHM FOCUS  2011    Re-do PVI 2011 (RFA-PVI + CFE + VIDYA + confirmation of CTI line)     TOTAL SHOULDER REPLACEMENT Right 2016    Dr. Abernathy of [facility]      Allergies   Allergen Reactions     Adhesive Tape Other (See Comments)     ADHESIVE TAPE; SKIN IRRITATION  Foam tape:  Skin removed with tape removal  Skin pulled off with foam tape       Amiodarone      ADVERSE REACTION.  Sunlight sensitivity.     Lisinopril       Social History     Tobacco Use     Smoking status: Former Smoker     Packs/day: 1.00     Years: 4.00     Pack years: 4.00     Types: Cigarettes     Quit date: 1968     Years since quittin.9     Smokeless tobacco: Never Used   Substance Use Topics     Alcohol use: Yes     Alcohol/week: 2.0 standard drinks     Comment: Alcoholic Drinks/day: 1 beer per week      Wt Readings from Last 1 Encounters:   21 122.2 kg (269 lb 8 oz)        Anesthesia Evaluation   Pt has had prior anesthetic.     No history of anesthetic complications       ROS/MED HX  ENT/Pulmonary:     (+) asthma COPD,  (-) sleep apnea and recent URI   Neurologic:     (+) peripheral neuropathy,     Cardiovascular:     (+) Dyslipidemia hypertension-Peripheral Vascular Disease-CAD ---pacemaker, ICD dysrhythmias, a-fib, valvular problems/murmurs HOCM.     METS/Exercise Tolerance:     Hematologic:       Musculoskeletal:       GI/Hepatic:       Renal/Genitourinary:       Endo:     (+) Obesity,  (-) Type I DM and Type II DM   Psychiatric/Substance Use:       Infectious Disease:       Malignancy:       Other:            Physical Exam    Airway        Mallampati: III   TM distance: > 3 FB   Neck ROM: full   Mouth opening: > 3 cm    Respiratory Devices and Support         Dental       (+) caps      Cardiovascular          Rhythm and rate: irregular     Pulmonary    pulmonary exam normal                OUTSIDE LABS:  CBC:   Lab Results   Component Value Date    WBC 8.3 11/17/2021    WBC 4.8 10/25/2018    HGB 11.7 (L) 11/17/2021    HGB 11.9 11/10/2020    HCT 39.1 (L) 11/17/2021    HCT 37.2 (L) 10/25/2018     11/17/2021     10/25/2018     BMP:   Lab Results   Component Value Date     11/17/2021     10/19/2021    POTASSIUM 4.9 11/19/2021    POTASSIUM 5.1 (H) 11/17/2021    CHLORIDE 106 11/17/2021    CHLORIDE 110 (H) 10/19/2021    CO2 26 11/17/2021    CO2 25 10/19/2021    BUN 37 (H) 11/17/2021    BUN 30 (H) 10/19/2021    CR 1.43 (H) 11/17/2021    CR 1.10 10/19/2021     11/17/2021     10/19/2021     COAGS:   Lab Results   Component Value Date    PTT 34 10/25/2018    INR 2.4 11/19/2021     POC: No results found for: BGM, HCG, HCGS  HEPATIC:   Lab Results   Component Value Date    ALBUMIN 3.8 11/17/2021    PROTTOTAL 7.3 11/17/2021    ALT 16 11/17/2021    AST 23 11/17/2021    ALKPHOS 48 11/17/2021    BILITOTAL 1.0 11/17/2021     OTHER:   Lab Results   Component Value Date    AMBIKA 9.8 11/17/2021    MAG 2.1 09/09/2021    TSH 1.36 07/06/2018       Anesthesia Plan    ASA Status:  3      Anesthesia Type: General.     - Airway: Mask Only      Maintenance: TIVA.        Consents    Anesthesia Plan(s) and associated risks, benefits, and realistic alternatives discussed. Questions answered and patient/representative(s) expressed understanding.    - Discussed:     - Discussed with:  Patient      - Patient is DNR/DNI Status: No         Postoperative Care    Pain management: Multi-modal analgesia, Oral pain medications, IV analgesics.   PONV prophylaxis: Ondansetron (or other 5HT-3), Dexamethasone or Solumedrol     Comments:                Franco Angulo MD

## 2021-11-19 NOTE — PROGRESS NOTES
Discharge home.  No questions.  Follow up scheduled for both heart failure and atrial fibrillation in clinic.  No changes in medications.

## 2021-11-19 NOTE — DISCHARGE INSTRUCTIONS
Discharge Instructions for Cardioversion  Your healthcare provider did a procedure called cardioversion. He or she used a controlled electric shock or a medicine to briefly stop all electrical activity in your heart. This helped restore your heart s normal rhythm. Here are some instructions to follow while you recover.  Home care    Before cardioversion, you will typically be given sedation. So, you won't be able to drive home. You will need a ride. Wait at least 24 hours before driving a car or operating heavy machinery after getting sedating medicines.    The skin on your chest may be irritated or feel like it's sunburned. Your healthcare provider may prescribe a soothing lotion to ease this discomfort. These minor symptoms will go away in a few days.    Ask your healthcare provider about medicines to keep your heart rhythm steady.    If you were prescribed medicine, take it as instructed by your healthcare provider. Don t skip doses or take double doses. Cardioversion requires blood thinners for at least 4 weeks after the procedure to prevent a delayed risk of stroke when treating atrial fibrillation or atrial flutter. Be sure you discuss which medicine you are taking to prevent stroke. Ask when you need to have your medicine levels checked. Also ask whether you may be able to stop taking it in the future or whether you need to take it for life. Some of these blood-thinning medicines such as warfarin will have the dose adjusted, and interact with other medicines or foods. Your healthcare team will give you full instructions on what to watch out for. Report bleeding or symptoms of stroke immediately to your healthcare team and seek emergency medical attention.    Learn to take your own pulse. Keep a record of your results. Ask your healthcare provider when you should seek emergency medical attention. He or she will tell you which pulse rate reading is dangerous.     Cardioversion is usually short term  (temporary). You may need it repeated if the abnormal heart rhythm returns. After the procedure, your healthcare provider will tell you if the treatment worked or if you will need further treatments or medicine.    Follow-up care  Make a follow-up appointment, or as advised.  When to call your healthcare provider  Call 911 right away if you have:    Chest pain    Shortness of breath    Loss of vision, speech, or strength or coordination in any body part  Call your healthcare provider right away if you:    Feel faint, dizzy, or lightheaded    Have chest pain with increased activity    Have irregular heartbeat or fast pulse    Have bleeding issues from blood-thinning medicines  [employer] last reviewed this educational content on 5/1/2019 2000-2021 [employer]. All rights reserved. This information is not intended as a substitute for professional medical care. Always follow your healthcare professional's instructions.

## 2021-11-19 NOTE — PROCEDURES
[facility]    Procedure: Cardioversion    Date/Time: 11/19/2021 11:22 AM  Performed by: Lauren Foster APRN CNP  Authorized by: Everett Renee MD       UNIVERSAL PROTOCOL   Site Marked: NA  Prior Images Obtained and Reviewed:  Yes  Required items: Required blood products, implants, devices and special equipment available    Patient identity confirmed:  Verbally with patient, arm band and provided demographic data  Patient was reevaluated immediately before administering moderate or deep sedation or anesthesia  Confirmation Checklist:  Patient's identity using two indicators, relevant allergies, procedure was appropriate and matched the consent or emergent situation and correct equipment/implants were available  Time out: Immediately prior to the procedure a time out was called    Universal Protocol: the Joint Commission Universal Protocol was followed    Preparation: Patient was prepped and draped in usual sterile fashion       ANESTHESIA  Anesthesia was administered and monitored by anesthesiology.  See anesthesia documentation for details.    PROCEDURE DETAILS  Pre-procedure rhythm: atrial fibrillation  Patient position: patient was placed in a supine position  Chest area: chest area exposed  Electrodes: pads  Electrodes placed: anterior-posterior  Number of attempts: 3    Details of Attempts:  At 1018, after administration of IV Brevital by MDA and confirmation of adequate sedation, he received a synchronous shock of 200 J and remained in AF.  He immediately received a second synchronous shock at 300 J, still in A. fib.  Confirmation of adequate sedation, he received a third synchronous shock at 360 J with conversion to sinus rhythm.  He had frequent PACs and runs of AF.  He converted back to AF, but prior to discharge spontaneously converted back to sinus/paced rhythm.  Post-procedure rhythm: paced-atrial  Complications: no complications      PROCEDURE  Describe Procedure: Long  history of atrial fibrillation with previous antiarrhythmic medication failure of amiodarone and dofetilide.  He is on high dose of sotalol.  He has been back in AF for approximately 1 month.  Last cardioversion on 6/9/2021.  He has had progression of his lung disease, now on supplemental oxygen at 5 L.  With recurrence of AF, he has had a corresponding increase in fluid retention despite controlled ventricular rates.  He is on long-term oral anticoagulation with warfarin.  Weekly INRs have remained >2.0 since at least 10/19/2021.  INR today 2.4.  Parent issues with atrial sensing and retrograde conduction.  Pacemaker reprogrammed.  Discussed with Dr. Alvarez.  Continue warfarin for stroke prophylaxis.  INR in 1 week.  Continue sotalol 160 mg twice daily.  Heart failure follow-up with Dr. Garrett on 11/23/2021.    Length of time physician/provider present for 1:1 monitoring during sedation: 0

## 2021-11-19 NOTE — INTERVAL H&P NOTE
I have reviewed the surgical (or preoperative) H&P that is linked to this encounter, and examined the patient. There are no significant changes.  INRs reviewed.

## 2021-11-19 NOTE — ANESTHESIA POSTPROCEDURE EVALUATION
Patient: Buck Sinclair    Procedure: * No procedures listed *  Cardioversion External    Diagnosis:* No pre-op diagnosis entered *  Diagnosis Additional Information: No value filed.    Anesthesia Type:  General    Note:  Disposition: Outpatient   Postop Pain Control: Uneventful            Sign Out: Well controlled pain   PONV: No   Neuro/Psych: Uneventful            Sign Out: Acceptable/Baseline neuro status   Airway/Respiratory: Uneventful            Sign Out: Acceptable/Baseline resp. status   CV/Hemodynamics: Uneventful            Sign Out: Acceptable CV status; No obvious hypovolemia; No obvious fluid overload   Other NRE: NONE   DID A NON-ROUTINE EVENT OCCUR? No           Last vitals:  Vitals Value Taken Time   BP     Temp     Pulse 62 11/19/21 1151   Resp 28 11/19/21 1149   SpO2 97 % 11/19/21 1151   Vitals shown include unvalidated device data.    Electronically Signed By: Franco Angulo MD  November 19, 2021  12:03 PM

## 2021-11-19 NOTE — PROGRESS NOTES
ANTICOAGULATION  MANAGEMENT: Discharge Review    Buck Sinclair chart reviewed for anticoagulation continuity of care    Hospital Admission on 11/19 for EP CV Procedure.    Discharge disposition: Home    Results:    Recent labs: (last 7 days)     11/16/21  0000 11/19/21  0853   INR 2.2* 2.4     Anticoagulation inpatient management:     not applicable     Anticoagulation discharge instructions:     Warfarin dosing: home regimen continued   Bridging: No   INR goal change: No      Medication changes affecting anticoagulation: No    Additional factors affecting anticoagulation: No    Plan     No adjustment to anticoagulation plan needed    Patient not contacted   Patient checks INR with Home monitor.    No adjustment to Anticoagulation Calendar was required    Gisele Prince RN

## 2021-11-23 ENCOUNTER — OFFICE VISIT (OUTPATIENT)
Dept: CARDIOLOGY | Facility: CLINIC | Age: 76
End: 2021-11-23
Payer: OTHER MISCELLANEOUS

## 2021-11-23 ENCOUNTER — DOCUMENTATION ONLY (OUTPATIENT)
Dept: ANTICOAGULATION | Facility: CLINIC | Age: 76
End: 2021-11-23
Payer: COMMERCIAL

## 2021-11-23 ENCOUNTER — TRANSFERRED RECORDS (OUTPATIENT)
Dept: HEALTH INFORMATION MANAGEMENT | Facility: CLINIC | Age: 76
End: 2021-11-23
Payer: COMMERCIAL

## 2021-11-23 VITALS
HEIGHT: 75 IN | SYSTOLIC BLOOD PRESSURE: 100 MMHG | WEIGHT: 272 LBS | BODY MASS INDEX: 33.82 KG/M2 | DIASTOLIC BLOOD PRESSURE: 80 MMHG | RESPIRATION RATE: 14 BRPM | HEART RATE: 62 BPM

## 2021-11-23 DIAGNOSIS — I25.10 CORONARY ARTERY DISEASE INVOLVING NATIVE CORONARY ARTERY OF NATIVE HEART WITHOUT ANGINA PECTORIS: ICD-10-CM

## 2021-11-23 DIAGNOSIS — Z95.810 ICD (IMPLANTABLE CARDIOVERTER-DEFIBRILLATOR), DUAL, IN SITU: ICD-10-CM

## 2021-11-23 DIAGNOSIS — J44.9 CHRONIC OBSTRUCTIVE PULMONARY DISEASE, UNSPECIFIED COPD TYPE (H): ICD-10-CM

## 2021-11-23 DIAGNOSIS — I48.91 ATRIAL FIBRILLATION (H): Primary | ICD-10-CM

## 2021-11-23 DIAGNOSIS — I48.19 PERSISTENT ATRIAL FIBRILLATION (H): ICD-10-CM

## 2021-11-23 DIAGNOSIS — I50.32 CHRONIC DIASTOLIC HEART FAILURE (H): Primary | ICD-10-CM

## 2021-11-23 LAB
ANION GAP SERPL CALCULATED.3IONS-SCNC: 14 MMOL/L (ref 5–18)
BUN SERPL-MCNC: 36 MG/DL (ref 8–28)
CALCIUM SERPL-MCNC: 9.4 MG/DL (ref 8.5–10.5)
CHLORIDE BLD-SCNC: 105 MMOL/L (ref 98–107)
CO2 SERPL-SCNC: 25 MMOL/L (ref 22–31)
CREAT SERPL-MCNC: 1.39 MG/DL (ref 0.7–1.3)
GFR SERPL CREATININE-BSD FRML MDRD: 49 ML/MIN/1.73M2
GLUCOSE BLD-MCNC: 101 MG/DL (ref 70–125)
INR (EXTERNAL): 2.2 (ref 0.9–1.1)
POTASSIUM BLD-SCNC: 4.5 MMOL/L (ref 3.5–5)
SODIUM SERPL-SCNC: 144 MMOL/L (ref 136–145)

## 2021-11-23 PROCEDURE — 36415 COLL VENOUS BLD VENIPUNCTURE: CPT | Performed by: GENERAL ACUTE CARE HOSPITAL

## 2021-11-23 PROCEDURE — 80048 BASIC METABOLIC PNL TOTAL CA: CPT | Performed by: GENERAL ACUTE CARE HOSPITAL

## 2021-11-23 PROCEDURE — 99215 OFFICE O/P EST HI 40 MIN: CPT | Performed by: GENERAL ACUTE CARE HOSPITAL

## 2021-11-23 ASSESSMENT — MIFFLIN-ST. JEOR: SCORE: 2049.41

## 2021-11-23 NOTE — PROGRESS NOTES
ANTICOAGULATION  MANAGEMENT-Home Monitor Managed by Exception    Buck Sinclair 76 year old male is on warfarin with therapeutic INR result. (Goal INR 2.0-3.0)    Recent labs: (last 7 days)     11/23/21  0000   INR 2.2*         Previous INR was Therapeutic    Medication, diet, health changes since last INR:chart reviewed; none identified    Contacted within the last 12 weeks by phone on 10/19/21      LUCAS Jane was NOT contacted regarding therapeutic result today per home monitoring policy manage by exception agreement.   Current warfarin dose is to be continued:     Summary  As of 11/23/2021    Full warfarin instructions:  5 mg every Mon, Thu; 2.5 mg all other days   Next INR check:  11/30/2021           ?   Judy Hilliard RN  Anticoagulation Clinic  11/23/2021    _______________________________________________________________________     Anticoagulation Episode Summary     Current INR goal:  2.0-3.0   TTR:  88.7 % (1 y)   Target end date:  Indefinite   Send INR reminders to:  [facility]    Indications    Persistent Atrial Fibrillation [I48.91]  Persistent atrial fibrillation (H) [I48.19]           Comments:  home monitor talk to wife about dose Neela   [phone]         Anticoagulation Care Providers     Provider Role Specialty Phone number    Erica Hansen APRN [facility]  Family Medicine [phone]    Everett Renee MD  Cardiovascular Disease [phone]

## 2021-11-23 NOTE — LETTER
11/23/2021    Vivek Guerrero MD  [facility] [address]  [facility] [address]    RE: Buck Sinclair       Dear Colleague,    I had the pleasure of seeing Buck Sinclair in the [facility].    HEART CARE ENCOUNTER NOTE          Assessment/Recommendations   Assessment:    1. Chronic congestive heart failure with reduced left ventricular ejection fraction with left ventricular ejection fraction improved to 50% due to nonischemic dilated cardiomyopathy (out of proportion to his degree of coronary artery disease). NYHA class III. He is still a bit fluid-overloaded but is responding well to a higher dose of loop diuretics as well as conversion out of atrial fibrillation.  2. Coronary artery disease status post drug-eluting stenting x3 to the lipmflsf-gocvxpi-tnxohm left anterior descending artery on 9/20/2017. Denies angina.  3. Moderate-to-severe pulmonary hypertension. Suspect a combination of WHO group II (left-sided heart disease) and group III (lung disease).  4. Medtronic dual-chamber implantable cardiac defibrillator placement on 6/11/2014 for primary prevention of sudden cardiac death.  5. History of Medtronic dual-chamber permanent pacemaker placement in 1999 with generator replacement in 2005 and device removal in 2014.  6. Persistent atrial fibrillation status post multiple electrical cardioversions, most recently on 11/19/2021. He underwent complex catheter ablation x2 in 2011. Appears to be in regular rhythm still today. [id number] score is at least 5.  7. Benign essential hypertension.  8. Hyperlipidemia.  9. Chronic obstructive pulmonary disease on home oxygen.  10. 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    Plan:  1. Continue furosemide 80 mg daily.  2. Check basic metabolic panel today.  3. Transthoracic echocardiogram pending.  4. If he starts to gain weight and/or his respiratory status worsens, we can consider increasing  furosemide to 80 mg twice daily.  5. Sotalol 160 mg twice daily.  6. Losartan 50 mg daily.  7. Anticoagulation with warfarin goal INR 2-3.  8. We could consider addition of spironolactone 25 mg daily in the future, if his renal function and potassium allow.  9. Follows in device clinic and electrophysiology.  10. Follow-up with me in 2 months.       A total time of 40 minutes was spent on the date of this encounter.    History of Present Illness   Mr. Buck Sinclair is a 76 year old male with a significant past history HFrEF due to nonischemic cardiomyopathy (out of proportion to CAD) with LVEF most recently noted to be 50%, persistent AF s/p multiple electrical cardioversions and extensive catheter ablations x2 in 2011, CAD s/p CANDELARIA x3 to the wcjmrokp-tu-dtcuht LAD, COPD on home O2, Medtronic dual-chamber permanent pacemaker placement in 1999 replaced with a Medtronic dual-chamber implantable cardiac defibrillator on 6/11/2014, HTN, and HLD presenting to establish care in general cardiology. He follows in electrophysiology with Dr. Renee.     Over the past couple months, he has noticed worsened dyspnea on exertion, weight gain, and oxygen requirement. His pulmonologist Dr. Payton increased his furosemide dose to 80 mg daily. His device check showed he had been in persistent AF for the past 2 months and Optivol suggested increased fluid. He underwent electrical cardioversion on 11/19/2021. It required 3 shocks before finally staying out of atrial fibrillation.    He feels better now. His weight at home has been 260-264 labs for the last few days (previously over 280 lbs) and his oxygen requirement is down. His leg swelling has decreased. He denies any chest pain/pressure/tightness, light headedness/dizziness, pre-syncope, syncope, palpitations, paroxysmal nocturnal dyspnea (PND), or orthopnea.    He developed photosensitivity on amiodarone.     Cardiac Problems and Cardiac Diagnostics     Most Recent Cardiac  testing:  ECG dated 11/19/2021 (personaly reviewed and interpreted): A-paced, RBBB    Device check 11/12/2021 (report reviewed):  Type: In clinic ICD check with Dr Renee.  Presenting: Afib/VS/ 64 bpm with some atrial undersensing, already set to the most sensitive. AP 61%,  66%.  Lead/Battery Status: Stable impedances, pacing and sensing thresholds. Battery estimates 6 months remaining.  Atrial Arrhythmias: 907 mode switches, 38% burden. Continuous AFib for the past 63 days. VR >120 bpm ~2% while in atrial arrhythmia. EGMs confirm atrial arrhythmia.    Vent Arrhythmias: No VHR.   Anticoagulant: Warfarin  Comments: Normal device function. No changes made. Wavelet Template has match scores of 94-97%.  Optivol fluid level has been elevated for 2 months, shows return to baseline (patient has lost 10 pounds in the past 5 days due to increased Lasix dose). Dr Renee plans CDV this Friday, possible upgrade to CRT-D at gen change time, may not be possible depending on patient condition/vasculature.  Alerts: Device has potential for shortened RRT to EOS.     ECHO 11/12/2020 (report reviewed):    Normal left ventricular size.The calculated left ventricular ejection fraction is 50%. This represents a mildly decreased ejection fraction. Abnormal septal motion. Systolic and diastolic flattening of the interventricular septum consistent with right   ventricle pressure and volume overload.    Left atrial volume is severely increased.    The right ventricle is mildly dilated. TAPSE is abnormal, which is consistent with abnormal right ventricular systolic function. Pacer lead is present in the ventricle.    Moderate pulmonary hypertension present. The estimated systolic pulmonary artery pressure is 68 mmhg.    Estimated central venous pressure equal to 15 mmHg.    The ascending aorta is mildly dilated.    Stress test 11/7/2019 (report reviewed):      The nuclear stress test is abnormal.     There is a small area of  "nontransmural infarction in the apical segment(s) of the left ventricle.     The left ventricular ejection fraction at stress is 63%.     A prior study was conducted on 7/19/2018.  This study has changes noted when compared with the prior study. The apical defect no longer demonstrates any ischemia.    Cardiac cath 9/20/2017 (report reviewed):  LM:large, 10% irregularity  LAD:90% proximal lesions at the take off of a large diagonal branch, 30-40% diffuse disease in mid-vessel  Lcx:10-30% diffuse irregularity  RCA:dominant, 10-30% diffusely irregular     Access:  6F R Radial artery  7F R Femoral artery     PCI:  LMT was engaged w/ a 6F EBU 3.5 Guide catheter and the diagonal and LAD were wired w/ Forte wires, the lesion in mLAD was  ballooned w/ a 2.5x12mm Emerge balloon, stented w/ a 3.5x20mm Synergy EES, and post-dilated w/ the stent balloon after removing   the diagonal wire. LAD IVUS demonstrated apprpriate stent sizing w/ some under-deployment in proximal and mid-stent, so we dilated the D1 w/ a low pressure inflation of a 2.5x12mm Emerge balloon, and stent was further post-dilated w/ a 4.0x12mm NC   Emerge balloon, however at this point we noted loss of flow in distal LAD, so we switched to RFA placing a 7F sheath (suboptimal guide support from the write due to pt's arm rest - all catheters are \"hubbed\") and then a 7F EBU 4.0 Gudie, and a Turnpike   LP-supported Forte were used to cross into the distal vessel, ballooning distal to the stent w/ a 2.5x30mm balloon w/ restoration of flow. Then, we placed 2.5x20mm Synergy EESx2, post-dilating w/ the stent balloon including to overlap. Of note, pt. Had   no CP or hemodynamic instability at any point in the procedure. Final angiography showed no dissection or perforation and a BASSAM 3 flow.       Medications  Allergies   Current Outpatient Medications   Medication Sig Dispense Refill     acetaminophen (TYLENOL) 325 MG tablet Take 650 mg by mouth 2 times daily       " albuterol (PROAIR HFA/PROVENTIL HFA/VENTOLIN HFA) 108 (90 Base) MCG/ACT inhaler Inhale 2 puffs into the lungs every 4 hours as needed for shortness of breath / dyspnea or wheezing       amoxicillin (AMOXIL) 500 MG tablet Take 500-2,000 mg by mouth once as needed (TAKE 1 HOUR PRIOR TO DENTAL APPOINTMENTS.)        Ascorbic Acid (VITAMIN C) 500 MG CAPS Take 1,000 mg by mouth daily       atorvastatin (LIPITOR) 40 MG tablet Take 40 mg by mouth daily       budesonide-formoterol (SYMBICORT) 160-4.5 MCG/ACT Inhaler Inhale 2 puffs into the lungs 2 times daily       co-enzyme Q-10 100 MG CAPS capsule Take 100 mg by mouth daily        fish oil-omega-3 fatty acids 1000 MG capsule Take 2 g by mouth 2 times daily       FLUoxetine (PROZAC) 20 MG capsule TAKE 1 CAPSULE BY MOUTH EVERY DAY       furosemide (LASIX) 40 MG tablet Take 2 tablets (80 mg) by mouth daily 90 tablet 3     losartan (COZAAR) 50 MG tablet Take 1 tablet (50 mg) by mouth daily 90 tablet 3     magnesium chloride 535 (64 Mg) MG TBEC CR tablet Take 64 mg by mouth 2 times daily       multivitamin, therapeutic (THERA-VIT) TABS tablet Take 1 tablet by mouth daily       OXYGEN-HELIUM IN 4-5 L        polyethylene glycol (MIRALAX) 17 GM/Dose powder Take 17 g by mouth daily        sotalol (BETAPACE) 160 MG tablet Take 160 mg by mouth 2 times daily       tiotropium (SPIRIVA) 18 MCG inhaled capsule Inhale 18 mcg into the lungs daily       vitamin D2 (ERGOCALCIFEROL) 98292 units (1250 mcg) capsule TAKE 1 CAPSULE BY MOUTH 2 TIMES A WEEK       warfarin ANTICOAGULANT (COUMADIN) 2.5 MG tablet Take 5mg (two tablets) on Monday and Thursday and 2.5mg (one tablet) all other days. Or as directed. 90 tablet 0     furosemide (LASIX) 80 MG tablet Take 1 tablet (80 mg) by mouth daily (Patient not taking: Reported on 11/23/2021) 30 tablet 6     magnesium 250 MG tablet Take 1 tablet by mouth daily (Patient not taking: Reported on 11/23/2021)        Allergies   Allergen Reactions     Adhesive  "Tape Other (See Comments)     ADHESIVE TAPE; SKIN IRRITATION  Foam tape:  Skin removed with tape removal  Skin pulled off with foam tape       Amiodarone      ADVERSE REACTION.  Sunlight sensitivity.     Lisinopril         Physical Examination Review of Systems   /80 (BP Location: Left arm, Patient Position: Sitting, Cuff Size: Adult Large)   Pulse 62   Resp 14   Ht 1.905 m (6' 3\")   Wt 123.4 kg (272 lb)   BMI 34.00 kg/m    Body mass index is 34 kg/m .  Wt Readings from Last 3 Encounters:   11/23/21 123.4 kg (272 lb)   11/19/21 122.2 kg (269 lb 8 oz)   11/17/21 122.5 kg (270 lb)       General Appearance:   Pleasant  male, appears  stated age. no acute distress, obese body habitus   ENT/Mouth: Facemask.     EYES:  no scleral icterus, normal conjunctivae   Neck: no carotid bruits. No anterior cervical lymphadenopaty   Respiratory:   lungs with diminished breath sounds, no rales or wheezing, sternal scar, equal chest wall expansion    Cardiovascular:   Regular rhythm, normal rate. Normal first and second heart sounds with no murmurs, rubs, or gallops; the carotid, radial and posterior tibial pulses are intact, Jugular venous pressure may be slightly elevated at 10 cm H2O, 1+ pitting lower extremity edema bilaterally    Abdomen/GI:  no organomegaly, masses, bruits, or tenderness; bowel sounds are present   Extremities: no cyanosis or clubbing   Skin: no xanthelasma, warm.    Heme/lymph/ Immunology No apparent bleeding noted.   Neurologic: Alert and oriented. normal gait, no tremors     Psychiatric: Pleasant, calm, appropriate affect.    A complete 10 system review of systems was performed and is negative except as mentioned in the HPI/subjective.         Past History   Past Medical History:   Past Medical History:   Diagnosis Date     Anemia      Asthma without status asthmaticus 5/5/2021     BPH (benign prostatic hyperplasia)      Cardiomyopathy (H)      COPD, group B, by GOLD 2017 classification (H)      " Coronary artery disease due to calcified coronary lesion      Dyslipidemia, goal LDL below 70      Essential hypertension      History of transfusion      Persistent atrial fibrillation (H)      Pneumonia of left lower lobe due to infectious organism 10/4/2017     Skin cancer of trunk      Status post catheter ablation of atrial fibrillation 6/7/2017    PVI 4-2011 (Cryo/PVI + roof line + CTI line) Re-do PVI 7-2011 (RFA/PVI + CFE + VIDYA + confirmed CTI line)     Ventricular tachycardia (H)        Past Surgical History:   Past Surgical History:   Procedure Laterality Date     C MYERS W/O FACETEC FORAMOT/DSKC 1/2 VRT SEG, CERVICAL      Laminectomy Lumbar;  Recorded: 03/09/2012;     CARDIAC DEFIBRILLATOR PLACEMENT       CARDIOVERSION  07/11/2018    x20, last 2/12/15, 10/2015, 11/18/16, 6/16/17 by Lauren Foster CNP     CARDIOVERSION  07/11/2018     CARDIOVERSION  11/19/2021     COLONOSCOPY N/A 4/28/2017    Procedure: COLONOSCOPY with 2 ascending polyps and 1 transverse polyp;  Surgeon: Jose Whittington MD;  Location: [facility] GI;  Service:      CV CORONARY ANGIOGRAM N/A 9/20/2017    Procedure: Coronary Angiogram;  Surgeon: Sergio Cervantes MD;  Location: [facility] Cath Lab;  Service:      EP ICD INSERT       FRACTURE SURGERY Left     wrist     INGUINAL HERNIA REPAIR Left 1967    while in the [employer] in [employer] after 13 month in [place]     INSERT / REPLACE / REMOVE PACEMAKER       IR MISCELLANEOUS PROCEDURE  4/30/2014     OTHER SURGICAL HISTORY      left hand surgery---tendon repair     IN ABLATE HEART DYSRHYTHM FOCUS  04/2011    Catheter Ablation Atrial Fibrillation PVI Apr 2011 (Cryo+RF-PVI + roof line + CTI line)     IN ABLATE HEART DYSRHYTHM FOCUS  07/2011    Re-do PVI Jul 2011 (RFA-PVI + CFE + VIDYA + confirmation of CTI line)     TOTAL SHOULDER REPLACEMENT Right 03/03/2016    Dr. Abernathy of [facility]       Family History:   Family History   Problem Relation Age of Onset     Cancer Mother          leukemia     Cancer Father         bladder     Cancer Sister         breast with lung met.     Aneurysm Sister      CABG Brother      CABG Brother      Valvular heart disease Brother         valve replacement       Social History:   Social History     Socioeconomic History     Marital status:      Spouse name: Not on file     Number of children: Not on file     Years of education: Not on file     Highest education level: Not on file   Occupational History     Not on file   Tobacco Use     Smoking status: Former Smoker     Packs/day: 1.00     Years: 4.00     Pack years: 4.00     Types: Cigarettes     Quit date: 1968     Years since quittin.9     Smokeless tobacco: Never Used   Substance and Sexual Activity     Alcohol use: Yes     Alcohol/week: 2.0 standard drinks     Comment: Alcoholic Drinks/day: 1 beer per week     Drug use: No     Sexual activity: Yes     Partners: Female     Birth control/protection: Post-menopausal   Other Topics Concern     Not on file   Social History Narrative    Preloaded 2013     Social Determinants of Health     Financial Resource Strain: Not on file   Food Insecurity: Not on file   Transportation Needs: Not on file   Physical Activity: Not on file   Stress: Not on file   Social Connections: Not on file   Intimate Partner Violence: Not on file   Housing Stability: Not on file              Lab Results    Chemistry/lipid CBC Cardiac Enzymes/BNP/TSH/INR   Lab Results   Component Value Date    CHOL 117 2021    HDL 54 2021    LDL 54 2021    TRIG 43 2021    CR 1.43 (H) 2021    BUN 37 (H) 2021    POTASSIUM 4.9 2021     2021    CO2 26 2021      Lab Results   Component Value Date    WBC 8.3 2021    HGB 11.7 (L) 2021    HCT 39.1 (L) 2021     (H) 2021     2021      Lab Results   Component Value Date     (H) 2020    TSH 1.36 2018    INR 2.4 2021           Domo Garrett MD [facility]  Non-Invasive Cardiologist  [facility]  Pager [phone]    Thank you for allowing me to participate in the care of your patient.      Sincerely,     Domo Garrett MD     [facility]  cc:   No referring provider defined for this encounter.

## 2021-11-23 NOTE — PROGRESS NOTES
HEART CARE ENCOUNTER NOTE          Assessment/Recommendations   Assessment:    Chronic congestive heart failure with reduced left ventricular ejection fraction with left ventricular ejection fraction improved to 50% due to nonischemic dilated cardiomyopathy (out of proportion to his degree of coronary artery disease). NYHA class III. He is still a bit fluid-overloaded but is responding well to a higher dose of loop diuretics as well as conversion out of atrial fibrillation.  Coronary artery disease status post drug-eluting stenting x3 to the ibvcwsxf-conbsve-vjlsvu left anterior descending artery on 9/20/2017. Denies angina.  Moderate-to-severe pulmonary hypertension. Suspect a combination of WHO group II (left-sided heart disease) and group III (lung disease).  Medtronic dual-chamber implantable cardiac defibrillator placement on 6/11/2014 for primary prevention of sudden cardiac death.  History of Medtronic dual-chamber permanent pacemaker placement in 1999 with generator replacement in 2005 and device removal in 2014.  Persistent atrial fibrillation status post multiple electrical cardioversions, most recently on 11/19/2021. He underwent complex catheter ablation x2 in 2011. Appears to be in regular rhythm still today. [id number] score is at least 5.  Benign essential hypertension.  Hyperlipidemia.  Chronic obstructive pulmonary disease on home oxygen.  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    Plan:  Continue furosemide 80 mg daily.  Check basic metabolic panel today.  Transthoracic echocardiogram pending.  If he starts to gain weight and/or his respiratory status worsens, we can consider increasing furosemide to 80 mg twice daily.  Sotalol 160 mg twice daily.  Losartan 50 mg daily.  Anticoagulation with warfarin goal INR 2-3.  We could consider addition of spironolactone 25 mg daily in the future, if his renal function and potassium allow.  Follows in device clinic and electrophysiology.  Follow-up with me in 2  months.       A total time of 40 minutes was spent on the date of this encounter.    History of Present Illness   Mr. Buck Sinclair is a 76 year old male with a significant past history HFrEF due to nonischemic cardiomyopathy (out of proportion to CAD) with LVEF most recently noted to be 50%, persistent AF s/p multiple electrical cardioversions and extensive catheter ablations x2 in 2011, CAD s/p CANDELARIA x3 to the gvwqmtig-zx-afoqnp LAD, COPD on home O2, Medtronic dual-chamber permanent pacemaker placement in 1999 replaced with a Medtronic dual-chamber implantable cardiac defibrillator on 6/11/2014, HTN, and HLD presenting to establish care in general cardiology. He follows in electrophysiology with Dr. Renee.     Over the past couple months, he has noticed worsened dyspnea on exertion, weight gain, and oxygen requirement. His pulmonologist Dr. Payton increased his furosemide dose to 80 mg daily. His device check showed he had been in persistent AF for the past 2 months and Optivol suggested increased fluid. He underwent electrical cardioversion on 11/19/2021. It required 3 shocks before finally staying out of atrial fibrillation.    He feels better now. His weight at home has been 260-264 labs for the last few days (previously over 280 lbs) and his oxygen requirement is down. His leg swelling has decreased. He denies any chest pain/pressure/tightness, light headedness/dizziness, pre-syncope, syncope, palpitations, paroxysmal nocturnal dyspnea (PND), or orthopnea.    He developed photosensitivity on amiodarone.     Cardiac Problems and Cardiac Diagnostics     Most Recent Cardiac testing:  ECG dated 11/19/2021 (personaly reviewed and interpreted): A-paced, RBBB    Device check 11/12/2021 (report reviewed):  Type: In clinic ICD check with Dr Renee.  Presenting: Afib/VS/ 64 bpm with some atrial undersensing, already set to the most sensitive. AP 61%,  66%.  Lead/Battery Status: Stable impedances, pacing and  sensing thresholds. Battery estimates 6 months remaining.  Atrial Arrhythmias: 907 mode switches, 38% burden. Continuous AFib for the past 63 days. VR >120 bpm ~2% while in atrial arrhythmia. EGMs confirm atrial arrhythmia.    Vent Arrhythmias: No VHR.   Anticoagulant: Warfarin  Comments: Normal device function. No changes made. Wavelet Template has match scores of 94-97%.  Optivol fluid level has been elevated for 2 months, shows return to baseline (patient has lost 10 pounds in the past 5 days due to increased Lasix dose). Dr Renee plans CDV this Friday, possible upgrade to CRT-D at gen change time, may not be possible depending on patient condition/vasculature.  Alerts: Device has potential for shortened RRT to EOS.     ECHO 11/12/2020 (report reviewed):    Normal left ventricular size.The calculated left ventricular ejection fraction is 50%. This represents a mildly decreased ejection fraction. Abnormal septal motion. Systolic and diastolic flattening of the interventricular septum consistent with right   ventricle pressure and volume overload.    Left atrial volume is severely increased.    The right ventricle is mildly dilated. TAPSE is abnormal, which is consistent with abnormal right ventricular systolic function. Pacer lead is present in the ventricle.    Moderate pulmonary hypertension present. The estimated systolic pulmonary artery pressure is 68 mmhg.    Estimated central venous pressure equal to 15 mmHg.    The ascending aorta is mildly dilated.    Stress test 11/7/2019 (report reviewed):      The nuclear stress test is abnormal.     There is a small area of nontransmural infarction in the apical segment(s) of the left ventricle.     The left ventricular ejection fraction at stress is 63%.     A prior study was conducted on 7/19/2018.  This study has changes noted when compared with the prior study. The apical defect no longer demonstrates any ischemia.    Cardiac cath 9/20/2017 (report  "reviewed):  LM:large, 10% irregularity  LAD:90% proximal lesions at the take off of a large diagonal branch, 30-40% diffuse disease in mid-vessel  Lcx:10-30% diffuse irregularity  RCA:dominant, 10-30% diffusely irregular     Access:  6F R Radial artery  7F R Femoral artery     PCI:  LMT was engaged w/ a 6F EBU 3.5 Guide catheter and the diagonal and LAD were wired w/ Forte wires, the lesion in mLAD was  ballooned w/ a 2.5x12mm Emerge balloon, stented w/ a 3.5x20mm Synergy EES, and post-dilated w/ the stent balloon after removing   the diagonal wire. LAD IVUS demonstrated apprpriate stent sizing w/ some under-deployment in proximal and mid-stent, so we dilated the D1 w/ a low pressure inflation of a 2.5x12mm Emerge balloon, and stent was further post-dilated w/ a 4.0x12mm NC   Emerge balloon, however at this point we noted loss of flow in distal LAD, so we switched to RFA placing a 7F sheath (suboptimal guide support from the write due to pt's arm rest - all catheters are \"hubbed\") and then a 7F EBU 4.0 Gudie, and a Turnpike   LP-supported Forte were used to cross into the distal vessel, ballooning distal to the stent w/ a 2.5x30mm balloon w/ restoration of flow. Then, we placed 2.5x20mm Synergy EESx2, post-dilating w/ the stent balloon including to overlap. Of note, pt. Had   no CP or hemodynamic instability at any point in the procedure. Final angiography showed no dissection or perforation and a BASSAM 3 flow.       Medications  Allergies   Current Outpatient Medications   Medication Sig Dispense Refill     acetaminophen (TYLENOL) 325 MG tablet Take 650 mg by mouth 2 times daily       albuterol (PROAIR HFA/PROVENTIL HFA/VENTOLIN HFA) 108 (90 Base) MCG/ACT inhaler Inhale 2 puffs into the lungs every 4 hours as needed for shortness of breath / dyspnea or wheezing       amoxicillin (AMOXIL) 500 MG tablet Take 500-2,000 mg by mouth once as needed (TAKE 1 HOUR PRIOR TO DENTAL APPOINTMENTS.)        Ascorbic Acid (VITAMIN " C) 500 MG CAPS Take 1,000 mg by mouth daily       atorvastatin (LIPITOR) 40 MG tablet Take 40 mg by mouth daily       budesonide-formoterol (SYMBICORT) 160-4.5 MCG/ACT Inhaler Inhale 2 puffs into the lungs 2 times daily       co-enzyme Q-10 100 MG CAPS capsule Take 100 mg by mouth daily        fish oil-omega-3 fatty acids 1000 MG capsule Take 2 g by mouth 2 times daily       FLUoxetine (PROZAC) 20 MG capsule TAKE 1 CAPSULE BY MOUTH EVERY DAY       furosemide (LASIX) 40 MG tablet Take 2 tablets (80 mg) by mouth daily 90 tablet 3     losartan (COZAAR) 50 MG tablet Take 1 tablet (50 mg) by mouth daily 90 tablet 3     magnesium chloride 535 (64 Mg) MG TBEC CR tablet Take 64 mg by mouth 2 times daily       multivitamin, therapeutic (THERA-VIT) TABS tablet Take 1 tablet by mouth daily       OXYGEN-HELIUM IN 4-5 L        polyethylene glycol (MIRALAX) 17 GM/Dose powder Take 17 g by mouth daily        sotalol (BETAPACE) 160 MG tablet Take 160 mg by mouth 2 times daily       tiotropium (SPIRIVA) 18 MCG inhaled capsule Inhale 18 mcg into the lungs daily       vitamin D2 (ERGOCALCIFEROL) 42869 units (1250 mcg) capsule TAKE 1 CAPSULE BY MOUTH 2 TIMES A WEEK       warfarin ANTICOAGULANT (COUMADIN) 2.5 MG tablet Take 5mg (two tablets) on Monday and Thursday and 2.5mg (one tablet) all other days. Or as directed. 90 tablet 0     furosemide (LASIX) 80 MG tablet Take 1 tablet (80 mg) by mouth daily (Patient not taking: Reported on 11/23/2021) 30 tablet 6     magnesium 250 MG tablet Take 1 tablet by mouth daily (Patient not taking: Reported on 11/23/2021)        Allergies   Allergen Reactions     Adhesive Tape Other (See Comments)     ADHESIVE TAPE; SKIN IRRITATION  Foam tape:  Skin removed with tape removal  Skin pulled off with foam tape       Amiodarone      ADVERSE REACTION.  Sunlight sensitivity.     Lisinopril         Physical Examination Review of Systems   /80 (BP Location: Left arm, Patient Position: Sitting, Cuff Size:  "Adult Large)   Pulse 62   Resp 14   Ht 1.905 m (6' 3\")   Wt 123.4 kg (272 lb)   BMI 34.00 kg/m    Body mass index is 34 kg/m .  Wt Readings from Last 3 Encounters:   11/23/21 123.4 kg (272 lb)   11/19/21 122.2 kg (269 lb 8 oz)   11/17/21 122.5 kg (270 lb)       General Appearance:   Pleasant  male, appears  stated age. no acute distress, obese body habitus   ENT/Mouth: Facemask.     EYES:  no scleral icterus, normal conjunctivae   Neck: no carotid bruits. No anterior cervical lymphadenopaty   Respiratory:   lungs with diminished breath sounds, no rales or wheezing, sternal scar, equal chest wall expansion    Cardiovascular:   Regular rhythm, normal rate. Normal first and second heart sounds with no murmurs, rubs, or gallops; the carotid, radial and posterior tibial pulses are intact, Jugular venous pressure may be slightly elevated at 10 cm H2O, 1+ pitting lower extremity edema bilaterally    Abdomen/GI:  no organomegaly, masses, bruits, or tenderness; bowel sounds are present   Extremities: no cyanosis or clubbing   Skin: no xanthelasma, warm.    Heme/lymph/ Immunology No apparent bleeding noted.   Neurologic: Alert and oriented. normal gait, no tremors     Psychiatric: Pleasant, calm, appropriate affect.    A complete 10 system review of systems was performed and is negative except as mentioned in the HPI/subjective.         Past History   Past Medical History:   Past Medical History:   Diagnosis Date     Anemia      Asthma without status asthmaticus 5/5/2021     BPH (benign prostatic hyperplasia)      Cardiomyopathy (H)      COPD, group B, by GOLD 2017 classification (H)      Coronary artery disease due to calcified coronary lesion      Dyslipidemia, goal LDL below 70      Essential hypertension      History of transfusion      Persistent atrial fibrillation (H)      Pneumonia of left lower lobe due to infectious organism 10/4/2017     Skin cancer of trunk      Status post catheter ablation of atrial " fibrillation 6/7/2017    PVI 4-2011 (Cryo/PVI + roof line + CTI line) Re-do PVI 7-2011 (RFA/PVI + CFE + VIDYA + confirmed CTI line)     Ventricular tachycardia (H)        Past Surgical History:   Past Surgical History:   Procedure Laterality Date     C MYERS W/O FACETEC FORAMOT/DSKC 1/2 VRT SEG, CERVICAL      Laminectomy Lumbar;  Recorded: 03/09/2012;     CARDIAC DEFIBRILLATOR PLACEMENT       CARDIOVERSION  07/11/2018    x20, last 2/12/15, 10/2015, 11/18/16, 6/16/17 by Lauren Foster CNP     CARDIOVERSION  07/11/2018     CARDIOVERSION  11/19/2021     COLONOSCOPY N/A 4/28/2017    Procedure: COLONOSCOPY with 2 ascending polyps and 1 transverse polyp;  Surgeon: Jose Whittington MD;  Location: [facility] GI;  Service:      CV CORONARY ANGIOGRAM N/A 9/20/2017    Procedure: Coronary Angiogram;  Surgeon: Sergio Cervantes MD;  Location: [facility] Cath Lab;  Service:      EP ICD INSERT       FRACTURE SURGERY Left     wrist     INGUINAL HERNIA REPAIR Left 1967    while in the [employer] in [place] after 13 month in [place]     INSERT / REPLACE / REMOVE PACEMAKER       IR MISCELLANEOUS PROCEDURE  4/30/2014     OTHER SURGICAL HISTORY      left hand surgery---tendon repair     [facility] ABLATE HEART DYSRHYTHM FOCUS  04/2011    Catheter Ablation Atrial Fibrillation PVI Apr 2011 (Cryo+RF-PVI + roof line + CTI line)     NH ABLATE HEART DYSRHYTHM FOCUS  07/2011    Re-do PVI Jul 2011 (RFA-PVI + CFE + VIDYA + confirmation of CTI line)     TOTAL SHOULDER REPLACEMENT Right 03/03/2016    Dr. Abernathy of [facility]       Family History:   Family History   Problem Relation Age of Onset     Cancer Mother         leukemia     Cancer Father         bladder     Cancer Sister         breast with lung met.     Aneurysm Sister      CABG Brother      CABG Brother      Valvular heart disease Brother         valve replacement       Social History:   Social History     Socioeconomic History     Marital status:      Spouse name: Not on file      Number of children: Not on file     Years of education: Not on file     Highest education level: Not on file   Occupational History     Not on file   Tobacco Use     Smoking status: Former Smoker     Packs/day: 1.00     Years: 4.00     Pack years: 4.00     Types: Cigarettes     Quit date: 1968     Years since quittin.9     Smokeless tobacco: Never Used   Substance and Sexual Activity     Alcohol use: Yes     Alcohol/week: 2.0 standard drinks     Comment: Alcoholic Drinks/day: 1 beer per week     Drug use: No     Sexual activity: Yes     Partners: Female     Birth control/protection: Post-menopausal   Other Topics Concern     Not on file   Social History Narrative    Preloaded 2013     Social Determinants of Health     Financial Resource Strain: Not on file   Food Insecurity: Not on file   Transportation Needs: Not on file   Physical Activity: Not on file   Stress: Not on file   Social Connections: Not on file   Intimate Partner Violence: Not on file   Housing Stability: Not on file              Lab Results    Chemistry/lipid CBC Cardiac Enzymes/BNP/TSH/INR   Lab Results   Component Value Date    CHOL 117 2021    HDL 54 2021    LDL 54 2021    TRIG 43 2021    CR 1.43 (H) 2021    BUN 37 (H) 2021    POTASSIUM 4.9 2021     2021    CO2 26 2021      Lab Results   Component Value Date    WBC 8.3 2021    HGB 11.7 (L) 2021    HCT 39.1 (L) 2021     (H) 2021     2021      Lab Results   Component Value Date     (H) 2020    TSH 1.36 2018    INR 2.4 2021          Domo Garrett MD [facility]  Non-Invasive Cardiologist  [facility]  Pager [phone]

## 2021-11-23 NOTE — PATIENT INSTRUCTIONS
1. We can continue the furosemide 80 mg once daily. If you are gaining more weight or getting more out of breath, let us know and we may increase your water pill.  2. See me back in 2 months.    Patient Education     Left- or Right- Side Congestive Heart Failure (CHF)    The heart is a large muscle that acts as a pump to circulate blood throughout the body. Blood carries oxygen to all of the organs, including the brain, muscles, and skin. After your body takes the oxygen out of the blood, the blood returns to the heart. The right side of the heart collects the blood from the body and pumps it to the lungs. In the lungs, it gets fresh oxygen and gives up carbon dioxide. The oxygen-rich blood from the lungs then returns to the left side of the heart, where it is pumped back out to the rest of your body, starting the process all over.  Congestive heart failure (CHF) occurs when the heart muscle does not function normally, leading to fluid retention or reduces blood flow. This can be caused by heart muscle weakness or stiffness, or a heart valve problem. Heart failure can affect the right side of the heart or the left side. But heart failure may affect not only the right side of the heart or only the left side. Although it may have started on one side, it can and often eventually does affect both sides.  Right-side heart failure  When the right side of the heart is failing, it can t handle the blood it is getting from the rest of the body. This blood returns to the heart through veins. When too much pressure builds up in the veins, fluid leaks out into the tissues. Gravity then causes that fluid to move to those parts of the body that are the lowest. So one of the first symptoms of right-side CHF can include swelling in the feet and ankles. If the condition gets worse, the swelling can even go up past the knees. Sometimes it gets so severe, the liver and intestines can get congested as well.  Left-side heart  failure  When the left side of the heart is failing, it can t handle the blood it gets from the lungs. Pressure then builds up in the veins of the lungs, causing fluid to leak into the lung tissues. This may cause CHF and pulmonary edema. This causes you to feel short of breath, weak, or dizzy. These symptoms are often worse with exertion, such as when climbing stairs or walking up hills. Lying with your head flat is uncomfortable and can make your breathing worse. This may make sleeping difficult. You may need to use extra pillows to elevate your upper body to sleep well. The same is true when just resting during the daytime. You may also feel weak or tired and have less energy during exertion.  There are many causes of heart failure including:    Coronary artery disease    Past heart attack (also known as acute myocardial infarction, or AMI)    High blood pressure    Damaged heart valve    Diabetes    Obesity    Cigarette smoking    Alcohol abuse  Heart failure is usually a chronic condition. The purpose of medical treatment is to improve the pumping action of the heart and to remove excess water from the body. A number of medicines can help reach this goal, improve symptoms, and prevent the heart from becoming weaker. Sometimes, heart failure can become so severe that a device is placed in the heart to help with pumping. Another major goal is to better treat the causes of heart failure, such as diabetes and high blood pressure, by making changes in your lifestyle and maximizing medical control when needed.  Home care  Follow these guidelines when caring for yourself at home:    Check your weight every day. This is very important because a sudden increase in weight gain could mean worsening heart failure. Keep these things in mind:  ? Use the same scale every day.  ? Weigh yourself at the same time every day.  ? Make sure the scale is on a hard floor surface, not on a rug or carpet.  ? Keep a record of your weight  every day so your healthcare provider can see it. If you are not given a log sheet for this, keep a separate journal for this purpose.     Cut back on the amount of salt (sodium) you eat. Follow your healthcare provider's recommendation on how much salt or sodium you should have each day.  ? Limit high-salt foods. These include olives, pickles, smoked meats, salted potato chips, and most prepared foods.  ? Don't add salt to your food at the table. Use only small amounts of salt when cooking.  ? Read the labels carefully on food packages to learn how much salt or sodium is in each serving in the package. Remember, a can or package of food may contain more than 1 serving. So if you eat all the food in the package, you may be getting more salt than you think.    Follow your healthcare provider's recommendations about how much fluid you should have. Be aware that some foods, such as soup, pudding, and juicy fruits like oranges or melons, contain liquid. You'll need to count the liquid in those foods as part of your daily fluid intake. Your provider can help you with this.    Stop smoking.    Cut back on how much alcohol you drink.    Lose weight if you are overweight. The excess weight adds a lot of stress on the workload of the heart.    Stay active. Talk with your provider about an exercise program that is safe for your heart.    Keep your feet elevated to reduce swelling. Ask your provider about support hose as a preventive treatment for daytime leg swelling.  Besides taking your medicine as instructed, an important part of treatment is lifestyle changes. These include diet, physical activity, stopping smoking, and weight control.  Improve your diet by including more fresh foods, cutting back on how much sugar and saturated fat you eat, and eating fewer processed foods and less salt.  Follow-up care  Follow up with your healthcare provider, or as advised.  Make sure to keep any appointments that were made for you.  These can help better control your congestive heart failure. You will need to follow up with your provider on a routine basis to make sure your heart failure is well managed.  If an X-ray, electrocardiogram (ECG), or other tests were done, you will be told of any new findings that may affect your care.  Call 911  Call 911 if you:    Become severely short of breath    Feel lightheaded, or feel like you might pass out or faint    Have chest pain or discomfort that is different than usual, the medicines your doctor told you to use for this don't help, or the pain lasts longer than 10 to 15 minutes    You suddenly develop a rapid heart rate  When to seek medical advice  The following may be signs that your heart failure is getting worse. Call your healthcare provider right away if any of these happen:    Sudden weight gain. This means 3 or more pounds in one day, or 5 or more pounds in 1 week    Trouble breathing not related to being active    New or increased swelling of your legs or ankles    Swelling or pain in your abdomen    Breathing trouble at night. This means waking up short of breath or needing more pillows to breathe.    Frequent coughing that doesn t go away    Feeling much more tired than usual  "[employer] last reviewed this educational content on 5/1/2018 2000-2021 [employer]. All rights reserved. This information is not intended as a substitute for professional medical care. Always follow your healthcare professional's instructions.

## 2021-11-30 ENCOUNTER — DOCUMENTATION ONLY (OUTPATIENT)
Dept: ANTICOAGULATION | Facility: CLINIC | Age: 76
End: 2021-11-30
Payer: COMMERCIAL

## 2021-11-30 ENCOUNTER — TRANSFERRED RECORDS (OUTPATIENT)
Dept: HEALTH INFORMATION MANAGEMENT | Facility: CLINIC | Age: 76
End: 2021-11-30
Payer: COMMERCIAL

## 2021-11-30 DIAGNOSIS — I48.91 ATRIAL FIBRILLATION (H): Primary | ICD-10-CM

## 2021-11-30 DIAGNOSIS — I48.19 PERSISTENT ATRIAL FIBRILLATION (H): ICD-10-CM

## 2021-11-30 LAB — INR (EXTERNAL): 2.7 (ref 0.9–1.1)

## 2021-11-30 NOTE — PROGRESS NOTES
Received a faxed INR result for Buck Sinclair    From [facility]    Result 11/30/2021 is 2.7

## 2021-11-30 NOTE — PROGRESS NOTES
ANTICOAGULATION  MANAGEMENT-Home Monitor Managed by Exception    Buck Sinclair 76 year old male is on warfarin with therapeutic INR result. (Goal INR 2.0-3.0)    Recent labs: (last 7 days)     11/30/21  0000   INR 2.7*         Previous INR was Therapeutic    Medication, diet, health changes since last INR:chart reviewed; none identified    Contacted within the last 12 weeks by phone on 10/19/2021      LUCAS Jane was NOT contacted regarding therapeutic result today per home monitoring policy manage by exception agreement.   Current warfarin dose is to be continued:     Summary  As of 11/30/2021    Full warfarin instructions:  5 mg every Mon, Thu; 2.5 mg all other days   Next INR check:  12/7/2021           ?   Gisele Prince RN  Anticoagulation Clinic  11/30/2021    _______________________________________________________________________     Anticoagulation Episode Summary     Current INR goal:  2.0-3.0   TTR:  88.7 % (1 y)   Target end date:  Indefinite   Send INR reminders to:  [facility]    Indications    Persistent Atrial Fibrillation [I48.91]  Persistent atrial fibrillation (H) [I48.19]           Comments:  home monitor talk to wife about dose Neela   [phone]         Anticoagulation Care Providers     Provider Role Specialty Phone number    Erica Hansen APRN [facility]  Family Medicine [phone]    Everett Renee MD  Cardiovascular Disease [phone]

## 2021-12-07 ENCOUNTER — DOCUMENTATION ONLY (OUTPATIENT)
Dept: ANTICOAGULATION | Facility: CLINIC | Age: 76
End: 2021-12-07
Payer: COMMERCIAL

## 2021-12-07 ENCOUNTER — TRANSFERRED RECORDS (OUTPATIENT)
Dept: HEALTH INFORMATION MANAGEMENT | Facility: CLINIC | Age: 76
End: 2021-12-07
Payer: COMMERCIAL

## 2021-12-07 DIAGNOSIS — I48.19 PERSISTENT ATRIAL FIBRILLATION (H): ICD-10-CM

## 2021-12-07 DIAGNOSIS — I48.91 ATRIAL FIBRILLATION (H): Primary | ICD-10-CM

## 2021-12-07 LAB — INR (EXTERNAL): 2.7 (ref 0.9–1.1)

## 2021-12-07 NOTE — PROGRESS NOTES
ANTICOAGULATION  MANAGEMENT-Home Monitor Managed by Exception    Buck Sinclair 76 year old male is on warfarin with therapeutic INR result. (Goal INR 2.0-3.0)    Recent labs: (last 7 days)     12/07/21  0000   INR 2.7*         Previous INR was Therapeutic    Medication, diet, health changes since last INR:chart reviewed; none identified    Contacted within the last 12 weeks by phone on 10/19/21      LUCAS Jane was NOT contacted regarding therapeutic result today per home monitoring policy manage by exception agreement.   Current warfarin dose is to be continued:     Summary  As of 12/7/2021    Full warfarin instructions:  5 mg every Mon, Thu; 2.5 mg all other days   Next INR check:  12/14/2021           ?   Cole Mohan RN  Anticoagulation Clinic  12/7/2021    _______________________________________________________________________     Anticoagulation Episode Summary     Current INR goal:  2.0-3.0   TTR:  88.7 % (1 y)   Target end date:  Indefinite   Send INR reminders to:  [facility]    Indications    Persistent Atrial Fibrillation [I48.91]  Persistent atrial fibrillation (H) [I48.19]           Comments:  home monitor talk to wife about dose Neela   [phone]         Anticoagulation Care Providers     Provider Role Specialty Phone number    Erica Hansen APRN [facility]  Family Medicine [phone]    Everett Renee MD  Cardiovascular Disease [phone]

## 2021-12-08 ENCOUNTER — HOSPITAL ENCOUNTER (OUTPATIENT)
Dept: CARDIOLOGY | Facility: HOSPITAL | Age: 76
Discharge: HOME | End: 2021-12-08
Attending: INTERNAL MEDICINE | Admitting: INTERNAL MEDICINE
Payer: OTHER MISCELLANEOUS

## 2021-12-08 DIAGNOSIS — I50.9 CONGESTIVE HEART FAILURE, UNSPECIFIED HF CHRONICITY, UNSPECIFIED HEART FAILURE TYPE (H): ICD-10-CM

## 2021-12-08 PROCEDURE — 93306 TTE W/DOPPLER COMPLETE: CPT | Mod: 26 | Performed by: INTERNAL MEDICINE

## 2021-12-08 PROCEDURE — 255N000002 HC RX 255 OP 636: Performed by: INTERNAL MEDICINE

## 2021-12-08 RX ADMIN — PERFLUTREN 3 ML: 6.52 INJECTION, SUSPENSION INTRAVENOUS at 11:45

## 2021-12-13 DIAGNOSIS — I48.91 ATRIAL FIBRILLATION (H): ICD-10-CM

## 2021-12-13 RX ORDER — WARFARIN SODIUM 2.5 MG/1
TABLET ORAL
Qty: 130 TABLET | Refills: 1 | Status: SHIPPED | OUTPATIENT
Start: 2021-12-13 | End: 2022-06-23

## 2021-12-14 ENCOUNTER — TRANSFERRED RECORDS (OUTPATIENT)
Dept: HEALTH INFORMATION MANAGEMENT | Facility: CLINIC | Age: 76
End: 2021-12-14
Payer: COMMERCIAL

## 2021-12-14 ENCOUNTER — DOCUMENTATION ONLY (OUTPATIENT)
Dept: ANTICOAGULATION | Facility: CLINIC | Age: 76
End: 2021-12-14
Payer: COMMERCIAL

## 2021-12-14 DIAGNOSIS — I48.91 ATRIAL FIBRILLATION (H): Primary | ICD-10-CM

## 2021-12-14 DIAGNOSIS — I48.19 PERSISTENT ATRIAL FIBRILLATION (H): ICD-10-CM

## 2021-12-14 LAB — INR HOME MONITORING: 2.9 (ref 2–3)

## 2021-12-14 NOTE — PROGRESS NOTES
ANTICOAGULATION  MANAGEMENT-Home Monitor Managed by Exception    Buck Sinclair 76 year old male is on warfarin with therapeutic INR result. (Goal INR 2.0-3.0)    Recent labs: (last 7 days)     12/14/21  0000   INR 2.90         Previous INR was Therapeutic    Medication, diet, health changes since last INR:chart reviewed; none identified    Contacted within the last 12 weeks by phone on 10/19/2021      LUCAS Jane was NOT contacted regarding therapeutic result today per home monitoring policy manage by exception agreement.   Current warfarin dose is to be continued:     Summary  As of 12/14/2021    Full warfarin instructions:  5 mg every Mon, Thu; 2.5 mg all other days   Next INR check:  12/28/2021           ?   Gisele Prince RN  Anticoagulation Clinic  12/14/2021    _______________________________________________________________________     Anticoagulation Episode Summary     Current INR goal:  2.0-3.0   TTR:  88.7 % (1 y)   Target end date:  Indefinite   Send INR reminders to:  [facility]    Indications    Persistent Atrial Fibrillation [I48.91]  Persistent atrial fibrillation (H) [I48.19]           Comments:  home monitor talk to wife about dose Neela   [phone]         Anticoagulation Care Providers     Provider Role Specialty Phone number    Erica Hansen APRN [facility]  Family Medicine [phone]    Everett Renee MD  Cardiovascular Disease [phone]

## 2021-12-17 ENCOUNTER — OFFICE VISIT (OUTPATIENT)
Dept: PULMONOLOGY | Facility: OTHER | Age: 76
End: 2021-12-17
Payer: OTHER MISCELLANEOUS

## 2021-12-17 VITALS — SYSTOLIC BLOOD PRESSURE: 110 MMHG | DIASTOLIC BLOOD PRESSURE: 68 MMHG | OXYGEN SATURATION: 100 % | HEART RATE: 65 BPM

## 2021-12-17 DIAGNOSIS — J44.9 COPD, GROUP B, BY GOLD 2017 CLASSIFICATION (H): Primary | ICD-10-CM

## 2021-12-17 DIAGNOSIS — R06.09 DOE (DYSPNEA ON EXERTION): ICD-10-CM

## 2021-12-17 DIAGNOSIS — I50.9 CONGESTIVE HEART FAILURE, UNSPECIFIED HF CHRONICITY, UNSPECIFIED HEART FAILURE TYPE (H): ICD-10-CM

## 2021-12-17 PROCEDURE — 99214 OFFICE O/P EST MOD 30 MIN: CPT | Performed by: INTERNAL MEDICINE

## 2021-12-17 RX ORDER — FUROSEMIDE 80 MG
80 TABLET ORAL 2 TIMES DAILY
Qty: 90 TABLET | Refills: 6 | Status: SHIPPED | OUTPATIENT
Start: 2021-12-17 | End: 2023-01-16

## 2021-12-17 NOTE — PROGRESS NOTES
Pulmonary Clinic Follow-up Visit    Assessment/Plan:  76 year old male with a history of tobacco dependence in remission, CAD s/p PCI/stents, afib s/p PVL with ICD/PPM on anticoagulation, history of cavitary lesion and extensive pneumonia in LLL in Oct 2017, subsequent recurrent LLL pneumonia, since resolved, presenting for follow up. Repeat PFT's in 2020 actually showed improved FEV1/FVC ratio, stable FEV1 but substantial worsening of the DLco (almost 40% lower than in 2018).  He was in congestive heart failure last visit and declined hospital admission or ER evaluation, so I increased his Lasix and he had improvement in his symptoms. He's being closely followed in cardiology clinic.  Today he is doing much better. The peripheral edema has improved. His oxygen requirement at rest has improved. His echo showed improvement in RVSP and LVEF compared to 2020. He remains at high risk for hospitalization due to CHF exacerbation. His heart failure appears to be mostly right-sided due to pulmonary HTN.      Plan:  #COPD, GOLD class B (CAT >10, few exacerbations/low risk for hospitalization). Minimal smoking history so this is probably due to his work as a . PFTs 2017 showed mild obstruction and normal DLco. Mild exertional hypoxemia noted on 6MWT in 2019. Now on supplemental oxygen as of 2020.  - continue budesonide-formoterol 160-4.5 mcg two inhalations BID with spacer; rinse/gargle/spit water after use. No dose changes today.  - continue tiotropium HandiHaler one inhalation daily  - Cont albuterol as needed  - encouraged exercise, weight loss as able  - continue supplemental O2 for goal O2 sat 88-92%   - did not have time to address pulmonary rehab today.  - no indication for LDCT as his smoking history is too minimal.   - UTD w/ pneumonia, flu and covid-19 vaccines.     #exertional hypoxemia, worsening DLco, significant LIMA: suspicion for PVD/pulm HTN since his LIMA seems out of proportion to PFT abnormalities  from 2018. Likely due to extensive left-sided heart disease and possible valvular disease as well as hypoxemic lung disease (emphysema). Significant worsening of DLco over the last 2 years. Last echo 2019 did not show markedly abnormal PA pressures but was a limited study. He did have borderline reduced EF of 45%. heart disease stable per patient. Last Echo improved RVSP and LVEF reported as normal, though no number for EF was given.  - continue Lasix 80mg bid per Dr. Garrett  - BMP, BNP check in 2 weeks.   - he should weigh himself daily.  - ER precautions reinforced.      #LLL nodule: decreasing in size on 3 month f/u scan after abx (last scan 2019). 9mm -> 7mm and associated with scarring  - no further imaging needed unless recurrent symptoms     #Hemoptysis in the past: no further recurrence. Likely was due to the prior LLL pneumonia in the setting of anticoagulation.  - continue INR goal close to 2, as discussed with Dr. Renee   - he'll let me know if he has any concerning symptoms.    #nocturnal hypoxemia:  - needs sleep study. Did not have time to address this today.     Follow up in 3 months.    CCx: follow up of COPD GOLD class D, chronic hypoxemic respiratory failure requiring suplpemental oxygen, CHF with volume overload.    HPI: Interim history: I last saw Buck on 11/12. He was very dyspneic and hypoxic at that visit. He declined hospital admission or ER evaluation. I increased his Lasix from 40mg to 80mg daily and asked him to follow up with his cardiologists.    Today, he reports he's doing better.  He lost 10lbs total. He is taking the Lasix and watching salt intake.  We reviewed his recent echo report.   Breathing is better. O2 sat 100% on 3Lpm. When he walks he needs up to 6Lpm.    ROS:  A 12-system review was obtained and was negative with the exception of the symptoms endorsed in the history of present illness.    PMH:  Past Medical History:   Diagnosis Date     Anemia      Asthma without status  asthmaticus 5/5/2021     BPH (benign prostatic hyperplasia)      COPD, group B, by GOLD 2017 classification (H)      Coronary artery disease due to calcified coronary lesion      Dyslipidemia, goal LDL below 70      Essential hypertension      History of cardiomyopathy      History of transfusion      Persistent atrial fibrillation (H)      Pneumonia of left lower lobe due to infectious organism 10/4/2017     Skin cancer of trunk      Status post catheter ablation of atrial fibrillation 6/7/2017    PVI 4-2011 (Cryo/PVI + roof line + CTI line) Re-do PVI 7-2011 (RFA/PVI + CFE + VIDYA + confirmed CTI line)     Ventricular tachycardia (H)        PSH:  Past Surgical History:   Procedure Laterality Date     CARDIAC DEFIBRILLATOR PLACEMENT       CARDIOVERSION  07/11/2018    x20, last 2/12/15, 10/2015, 11/18/16, 6/16/17 by Lauren Foster CNP     CARDIOVERSION  07/11/2018     COLONOSCOPY N/A 4/28/2017    Procedure: COLONOSCOPY with 2 ascending polyps and 1 transverse polyp;  Surgeon: Jose Whittington MD;  Location: [facility] GI;  Service:      CV CORONARY ANGIOGRAM N/A 9/20/2017    Procedure: Coronary Angiogram;  Surgeon: Sergio Cervantes MD;  Location: [facility] Cath Lab;  Service:      EP ICD INSERT       FRACTURE SURGERY Left     wrist     INGUINAL HERNIA REPAIR Left 1967    while in the [employer] in [place] after 13 month in [place]     INSERT / REPLACE / REMOVE PACEMAKER       left hand surgery---tendon repair       [facility] ABLATE HEART DYSRHYTHM FOCUS  04/2011    Catheter Ablation Atrial Fibrillation PVI Apr 2011 (Cryo+RF-PVI + roof line + CTI line)     [place] ABLATE HEART DYSRHYTHM FOCUS  07/2011    Re-do PVI Jul 2011 (RFA-PVI + CFE + VIDYA + confirmation of CTI line)     [place] MYERS W/O FACETEC FORAMOT/DSKC 1/2 VRT SEG, CERVICAL      Laminectomy Lumbar;  Recorded: 03/09/2012;     TOTAL SHOULDER REPLACEMENT Right 03/03/2016    Dr. Abernathy of [facility]       Allergies:  Allergies   Allergen Reactions     Adhesive Other (See  Comments)     ADHESIVE TAPE; SKIN IRRITATION  Foam tape:  Skin removed with tape removal     Amiodarone      ADVERSE REACTION.  Sunlight sensitivity.     Lisinopril Cough       Family HX:  Family History   Problem Relation Age of Onset     Cancer Mother         leukemia     Cancer Father         bladder     Cancer Sister         breast with lung met.     Aneurysm Sister      CABG Brother      CABG Brother      Valvular heart disease Brother         valve replacement       Social Hx:  Social History     Socioeconomic History     Marital status:      Spouse name: Neela     Number of children: Not on file     Years of education: 12     Highest education level: Not on file   Occupational History     Occupation:      Employer: RETIRED     Occupation: [employer]     Comment: [employer]   Social Needs     Financial resource strain: Not on file     Food insecurity     Worry: Not on file     Inability: Not on file     Transportation needs     Medical: Not on file     Non-medical: Not on file   Tobacco Use     Smoking status: Former Smoker     Packs/day: 1.00     Years: 4.00     Pack years: 4.00     Types: Cigarettes     Quit date: 1968     Years since quittin.3     Smokeless tobacco: Never Used   Substance and Sexual Activity     Alcohol use: Yes     Alcohol/week: 2.0 standard drinks     Types: 2 Cans of beer per week     Comment: 1 beer per week     Drug use: No     Sexual activity: Yes     Partners: Female     Birth control/protection: Post-menopausal   Lifestyle     Physical activity     Days per week: Not on file     Minutes per session: Not on file     Stress: Not on file   Relationships     Social connections     Talks on phone: Not on file     Gets together: Not on file     Attends Yazidi service: Not on file     Active member of club or organization: Not on file     Attends meetings of clubs or organizations: Not on file     Relationship status: Not on file     Intimate partner  violence     Fear of current or ex partner: Not on file     Emotionally abused: Not on file     Physically abused: Not on file     Forced sexual activity: Not on file   Other Topics Concern     Not on file   Social History Narrative     Not on file       Current Meds:  Current Outpatient Medications   Medication Sig Dispense Refill     ACETAMINOPHEN (TYLENOL ORAL) Take 1,000 mg by mouth 2 (two) times a day.        albuterol (PROAIR HFA;PROVENTIL HFA;VENTOLIN HFA) 90 mcg/actuation inhaler Inhale 2 puffs every 6 (six) hours as needed for wheezing. 1 Inhaler 11     amoxicillin (AMOXIL) 500 MG tablet Take prior to the Dentist       ascorbic acid (VITAMIN C) 1000 MG tablet Take 1,000 mg by mouth daily.       atorvastatin (LIPITOR) 40 MG tablet Take 40 mg by mouth at bedtime.       budesonide-formoterol (SYMBICORT) 160-4.5 mcg/actuation inhaler Inhale 2 puffs 2 (two) times a day.       co-enzyme Q-10 30 mg capsule Take 100 mg by mouth daily.       furosemide (LASIX) 40 MG tablet Take 1 tablet (40 mg total) by mouth daily. 90 tablet 3     losartan (COZAAR) 50 MG tablet Take 1 tablet (50 mg total) by mouth daily. 90 tablet 1     MAG 64 64 mg TbEC delayed-release tablet TAKE 1 TABLET(64 MG) BY MOUTH TWICE DAILY 180 tablet 1     multivitamin (MULTIVITAMIN) per tablet Take 1 tablet by mouth daily.       omega 3-dha-epa-fish oil (FISH OIL) 1,000 mg (120 mg-180 mg) cap Take 2 tablets by mouth 2 (two) times a day.        OXYGEN-AIR DELIVERY SYSTEMS [facility] Use As Directed. 2 L at rest/night and 3-4L with activity  [facility]       polyethylene glycol (MIRALAX) 17 gram packet Take 17 g by mouth daily.       sotaloL (BETAPACE) 80 MG tablet TAKE 2 TABLETS(160 MG) BY MOUTH TWICE DAILY. 360 tablet 0     tiotropium (SPIRIVA) 18 mcg inhalation capsule Place 18 mcg into inhaler and inhale daily.       VITAMIN D2 50,000 unit capsule Take 50,000 Units by mouth 2 (two) times a week. SUN and WED  3     warfarin ANTICOAGULANT (COUMADIN/JANTOVEN) 2.5  MG tablet Takes 1 tablet (2.5mg) to 2 tablets (5mg) by mouth daily, as directed.  Adjust dose based on INR results. 130 tablet 1     No current facility-administered medications for this visit.        Physical Exam:  /68   Pulse 65   SpO2 100% Gen: alert, oriented, no distress  HEENT: nasal turbinates are unremarkable, no oropharyngeal lesions, no cervical or supraclavicular lymphadenopathy  CV: irreg irreg, no M/G/R  Resp: clear lungs.  Abd: soft, nontender, no palpable organomegaly  Skin: no apparent rashes  Ext: 1+ pitting edema. Sig improvement from last visit.   Neuro: alert, nonfocal    Labs:  Reviewed  Dec 2018  Chem panel wnl  TSH wnl  hgb 12.1 Oct 2018    Previous testing  DONNA neg  blasto neg  Histo testing neg  c-ANCA neg  HIV neg  RF neg    Bronch/LLL BAL cultures neg    Imaging studies:  CT chest April 2018  IMPRESSION:   CONCLUSION:  1.  Mild scarring and slight bronchiectasis present at both lung bases. No active pneumonia identified.    CT chest 7/15/2019  IMPRESSION:   CONCLUSION:   1.  Nodular opacity of the left lower lobe of interest on 04/17/2019 is less conspicuous today and probably explained by scarring. Additional 6 month chest CT follow-up is recommended.  2.  Emphysema and scattered areas of scarring elsewhere in both lungs.  3.  Advanced multivessel coronary artery disease.    Echo Dec 2021  Interpretation Summary     1. Left ventricular systolic function is normal. The left ventricle is normal  in size. There is normal left ventricular wall thickness.Left ventricular  diastolic function is normal. No regional wall motion abnormalities noted.  2. The right ventricle is mildly dilated. Mildly decreased right ventricular  systolic functio.  3. There is mild to moderate (1-2+) tricuspid regurgitation.  4. The right ventricular systolic pressure is approximated at 45.7mmHg plus  the right atrial pressure.Right ventricular systolic pressure is elevated,  consistent with moderate  pulmonary hypertension. Normal RA pressure of 3 mmHg.  6. The ascending aorta is mildly dilated at 42 mm.    PFTs 2018  FEV1/FVC is 0.56 and is reduced.  FEV1 is 73% predicted and is reduced.  FVC is 96% predicted and normal.  There was no improvement in spirometry after a single inhaled dose of bronchodilator.  TLC is 99% predicted and is normal.  RV is 113% predicted and is normal.  DLCO is 93% predicted and is normal when it   is corrected for hemoglobin.     Impression:  Full Pulmonary Function Test is abnormal.  PFTs are consistent with mild obstructive disease.  Spirometry is not consistent with reversibility.  There is no hyperinflation.  There is no air-trapping.  Diffusion capacity when corrected for hemoglobin is normal.  Declines in both FEV1 and TLC since 2009 with overall preservation of diffusing capacity.    PFTs 11/10/2029  FEV1 2.42L 68%  FVC 76%  No BD response  Ratio 0.66 (LLN 0.6)  DLco 46% han for hgb  Flow vol curve suggests obstruction.  Impression: no obstruction based on ratio >LLN but FV curve does suggest some obstruction. Significant decline in DLco compared to DLco.     July 2019  SIX MINUTE WALK TEST / HOME O2 EVALUATION     SpO2 at rest on RA 96%  SpO2 started to drop after 2 1/2 minutes walking. SpO2 after walking 2 1/2 minutes on RA was 88%  SpO2 after walking 6 minutes on RA was 87%  Distance covered 320.04 meters.  Recovery phase, SpO2 after 1 minute rest on RA was 87%  Recovery phase, SpO2 after 2 minute rest on RA was 97%     Impression:   Significant desaturation with activities.   Patient does qualify for O2 supplementation with activity.    Hugh Payton MD (Avi)  [facility]monary [facility]  Pager [phone]  Clinic [phone]

## 2021-12-17 NOTE — LETTER
12/17/2021         RE: Buck Hernandezstrom  [address] [place] [address]        Dear Colleague,    Thank you for referring your patient, Buck Sinclair, to the [facility]. Please see a copy of my visit note below.    Pulmonary Clinic Follow-up Visit    Assessment/Plan:  76 year old male with a history of tobacco dependence in remission, CAD s/p PCI/stents, afib s/p PVL with ICD/PPM on anticoagulation, history of cavitary lesion and extensive pneumonia in LLL in Oct 2017, subsequent recurrent LLL pneumonia, since resolved, presenting for follow up. Repeat PFT's in 2020 actually showed improved FEV1/FVC ratio, stable FEV1 but substantial worsening of the DLco (almost 40% lower than in 2018).  He was in congestive heart failure last visit and declined hospital admission or ER evaluation, so I increased his Lasix and he had improvement in his symptoms. He's being closely followed in cardiology clinic.  Today he is doing much better. The peripheral edema has improved. His oxygen requirement at rest has improved. His echo showed improvement in RVSP and LVEF compared to 2020. He remains at high risk for hospitalization due to CHF exacerbation. His heart failure appears to be mostly right-sided due to pulmonary HTN.      Plan:  #COPD, GOLD class B (CAT >10, few exacerbations/low risk for hospitalization). Minimal smoking history so this is probably due to his work as a . PFTs 2017 showed mild obstruction and normal DLco. Mild exertional hypoxemia noted on 6MWT in 2019. Now on supplemental oxygen as of 2020.  - continue budesonide-formoterol 160-4.5 mcg two inhalations BID with spacer; rinse/gargle/spit water after use. No dose changes today.  - continue tiotropium HandiHaler one inhalation daily  - Cont albuterol as needed  - encouraged exercise, weight loss as able  - continue supplemental O2 for goal O2 sat 88-92%   - did not have time to address pulmonary rehab today.  -  no indication for LDCT as his smoking history is too minimal.   - UTD w/ pneumonia, flu and covid-19 vaccines.     #exertional hypoxemia, worsening DLco, significant LIMA: suspicion for PVD/pulm HTN since his LIMA seems out of proportion to PFT abnormalities from 2018. Likely due to extensive left-sided heart disease and possible valvular disease as well as hypoxemic lung disease (emphysema). Significant worsening of DLco over the last 2 years. Last echo 2019 did not show markedly abnormal PA pressures but was a limited study. He did have borderline reduced EF of 45%. heart disease stable per patient. Last Echo improved RVSP and LVEF reported as normal, though no number for EF was given.  - continue Lasix 80mg bid per Dr. Garrett  - BMP, BNP check in 2 weeks.   - he should weigh himself daily.  - ER precautions reinforced.      #LLL nodule: decreasing in size on 3 month f/u scan after abx (last scan 2019). 9mm -> 7mm and associated with scarring  - no further imaging needed unless recurrent symptoms     #Hemoptysis in the past: no further recurrence. Likely was due to the prior LLL pneumonia in the setting of anticoagulation.  - continue INR goal close to 2, as discussed with Dr. Renee   - he'll let me know if he has any concerning symptoms.    #nocturnal hypoxemia:  - needs sleep study. Did not have time to address this today.     Follow up in 3 months.    CCx: follow up of COPD GOLD class D, chronic hypoxemic respiratory failure requiring suplpemental oxygen, CHF with volume overload.    HPI: Interim history: I last saw Buck on 11/12. He was very dyspneic and hypoxic at that visit. He declined hospital admission or ER evaluation. I increased his Lasix from 40mg to 80mg daily and asked him to follow up with his cardiologists.    Today, he reports he's doing better.  He lost 10lbs total. He is taking the Lasix and watching salt intake.  We reviewed his recent echo report.   Breathing is better. O2 sat 100% on 3Lpm.  When he walks he needs up to 6Lpm.    ROS:  A 12-system review was obtained and was negative with the exception of the symptoms endorsed in the history of present illness.    PMH:  Past Medical History:   Diagnosis Date     Anemia      Asthma without status asthmaticus 5/5/2021     BPH (benign prostatic hyperplasia)      COPD, group B, by GOLD 2017 classification (H)      Coronary artery disease due to calcified coronary lesion      Dyslipidemia, goal LDL below 70      Essential hypertension      History of cardiomyopathy      History of transfusion      Persistent atrial fibrillation (H)      Pneumonia of left lower lobe due to infectious organism 10/4/2017     Skin cancer of trunk      Status post catheter ablation of atrial fibrillation 6/7/2017    PVI 4-2011 (Cryo/PVI + roof line + CTI line) Re-do PVI 7-2011 (RFA/PVI + CFE + VIDYA + confirmed CTI line)     Ventricular tachycardia (H)        PSH:  Past Surgical History:   Procedure Laterality Date     CARDIAC DEFIBRILLATOR PLACEMENT       CARDIOVERSION  07/11/2018    x20, last 2/12/15, 10/2015, 11/18/16, 6/16/17 by Lauren Foster CNP     CARDIOVERSION  07/11/2018     COLONOSCOPY N/A 4/28/2017    Procedure: COLONOSCOPY with 2 ascending polyps and 1 transverse polyp;  Surgeon: Jose Whittington MD;  Location: [facility] GI;  Service:      CV CORONARY ANGIOGRAM N/A 9/20/2017    Procedure: Coronary Angiogram;  Surgeon: Sergio Cervantes MD;  Location: [facility] Cath Lab;  Service:      EP ICD INSERT       FRACTURE SURGERY Left     wrist     INGUINAL HERNIA REPAIR Left 1967    while in the [employer] in [place] after 13 month in [place]     INSERT / REPLACE / REMOVE PACEMAKER       left hand surgery---tendon repair       NV ABLATE HEART DYSRHYTHM FOCUS  04/2011    Catheter Ablation Atrial Fibrillation PVI Apr 2011 (Cryo+RF-PVI + roof line + CTI line)     NV ABLATE HEART DYSRHYTHM FOCUS  07/2011    Re-do PVI Jul 2011 (RFA-PVI + CFE + VIDYA + confirmation of CTI line)     NV MYERS  W/O FACETEC FORAMOT/DSKC  VRT SEG, CERVICAL      Laminectomy Lumbar;  Recorded: 2012;     TOTAL SHOULDER REPLACEMENT Right 2016    Dr. Abernathy of [facility]       Allergies:  Allergies   Allergen Reactions     Adhesive Other (See Comments)     ADHESIVE TAPE; SKIN IRRITATION  Foam tape:  Skin removed with tape removal     Amiodarone      ADVERSE REACTION.  Sunlight sensitivity.     Lisinopril Cough       Family HX:  Family History   Problem Relation Age of Onset     Cancer Mother         leukemia     Cancer Father         bladder     Cancer Sister         breast with lung met.     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     Comment: [employer]   Social Needs     Financial resource strain: Not on file     Food insecurity     Worry: Not on file     Inability: Not on file     Transportation needs     Medical: Not on file     Non-medical: Not on file   Tobacco Use     Smoking status: Former Smoker     Packs/day: 1.00     Years: 4.00     Pack years: 4.00     Types: Cigarettes     Quit date: 1968     Years since quittin.3     Smokeless tobacco: Never Used   Substance and Sexual Activity     Alcohol use: Yes     Alcohol/week: 2.0 standard drinks     Types: 2 Cans of beer per week     Comment: 1 beer per week     Drug use: No     Sexual activity: Yes     Partners: Female     Birth control/protection: Post-menopausal   Lifestyle     Physical activity     Days per week: Not on file     Minutes per session: Not on file     Stress: Not on file   Relationships     Social connections     Talks on phone: Not on  file     Gets together: Not on file     Attends Advent service: Not on file     Active member of club or organization: Not on file     Attends meetings of clubs or organizations: Not on file     Relationship status: Not on file     Intimate partner violence     Fear of current or ex partner: Not on file     Emotionally abused: Not on file     Physically abused: Not on file     Forced sexual activity: Not on file   Other Topics Concern     Not on file   Social History Narrative     Not on file       Current Meds:  Current Outpatient Medications   Medication Sig Dispense Refill     ACETAMINOPHEN (TYLENOL ORAL) Take 1,000 mg by mouth 2 (two) times a day.        albuterol (PROAIR HFA;PROVENTIL HFA;VENTOLIN HFA) 90 mcg/actuation inhaler Inhale 2 puffs every 6 (six) hours as needed for wheezing. 1 Inhaler 11     amoxicillin (AMOXIL) 500 MG tablet Take prior to the Dentist       ascorbic acid (VITAMIN C) 1000 MG tablet Take 1,000 mg by mouth daily.       atorvastatin (LIPITOR) 40 MG tablet Take 40 mg by mouth at bedtime.       budesonide-formoterol (SYMBICORT) 160-4.5 mcg/actuation inhaler Inhale 2 puffs 2 (two) times a day.       co-enzyme Q-10 30 mg capsule Take 100 mg by mouth daily.       furosemide (LASIX) 40 MG tablet Take 1 tablet (40 mg total) by mouth daily. 90 tablet 3     losartan (COZAAR) 50 MG tablet Take 1 tablet (50 mg total) by mouth daily. 90 tablet 1     MAG 64 64 mg TbEC delayed-release tablet TAKE 1 TABLET(64 MG) BY MOUTH TWICE DAILY 180 tablet 1     multivitamin (MULTIVITAMIN) per tablet Take 1 tablet by mouth daily.       omega 3-dha-epa-fish oil (FISH OIL) 1,000 mg (120 mg-180 mg) cap Take 2 tablets by mouth 2 (two) times a day.        OXYGEN-AIR DELIVERY SYSTEMS [facility] Use As Directed. 2 L at rest/night and 3-4L with activity  [facility]       polyethylene glycol (MIRALAX) 17 gram packet Take 17 g by mouth daily.       sotaloL (BETAPACE) 80 MG tablet TAKE 2 TABLETS(160 MG) BY MOUTH TWICE DAILY. 360  tablet 0     tiotropium (SPIRIVA) 18 mcg inhalation capsule Place 18 mcg into inhaler and inhale daily.       VITAMIN D2 50,000 unit capsule Take 50,000 Units by mouth 2 (two) times a week. SUN and WED  3     warfarin ANTICOAGULANT (COUMADIN/JANTOVEN) 2.5 MG tablet Takes 1 tablet (2.5mg) to 2 tablets (5mg) by mouth daily, as directed.  Adjust dose based on INR results. 130 tablet 1     No current facility-administered medications for this visit.        Physical Exam:  /68   Pulse 65   SpO2 100% Gen: alert, oriented, no distress  HEENT: nasal turbinates are unremarkable, no oropharyngeal lesions, no cervical or supraclavicular lymphadenopathy  CV: irreg irreg, no M/G/R  Resp: clear lungs.  Abd: soft, nontender, no palpable organomegaly  Skin: no apparent rashes  Ext: 1+ pitting edema. Sig improvement from last visit.   Neuro: alert, nonfocal    Labs:  Reviewed  Dec 2018  Chem panel wnl  TSH wnl  hgb 12.1 Oct 2018    Previous testing  DONNA neg  blasto neg  Histo testing neg  c-ANCA neg  HIV neg  RF neg    Bronch/LLL BAL cultures neg    Imaging studies:  CT chest April 2018  IMPRESSION:   CONCLUSION:  1.  Mild scarring and slight bronchiectasis present at both lung bases. No active pneumonia identified.    CT chest 7/15/2019  IMPRESSION:   CONCLUSION:   1.  Nodular opacity of the left lower lobe of interest on 04/17/2019 is less conspicuous today and probably explained by scarring. Additional 6 month chest CT follow-up is recommended.  2.  Emphysema and scattered areas of scarring elsewhere in both lungs.  3.  Advanced multivessel coronary artery disease.    Echo Dec 2021  Interpretation Summary     1. Left ventricular systolic function is normal. The left ventricle is normal  in size. There is normal left ventricular wall thickness.Left ventricular  diastolic function is normal. No regional wall motion abnormalities noted.  2. The right ventricle is mildly dilated. Mildly decreased right ventricular  systolic  functio.  3. There is mild to moderate (1-2+) tricuspid regurgitation.  4. The right ventricular systolic pressure is approximated at 45.7mmHg plus  the right atrial pressure.Right ventricular systolic pressure is elevated,  consistent with moderate pulmonary hypertension. Normal RA pressure of 3 mmHg.  6. The ascending aorta is mildly dilated at 42 mm.    PFTs 2018  FEV1/FVC is 0.56 and is reduced.  FEV1 is 73% predicted and is reduced.  FVC is 96% predicted and normal.  There was no improvement in spirometry after a single inhaled dose of bronchodilator.  TLC is 99% predicted and is normal.  RV is 113% predicted and is normal.  DLCO is 93% predicted and is normal when it   is corrected for hemoglobin.     Impression:  Full Pulmonary Function Test is abnormal.  PFTs are consistent with mild obstructive disease.  Spirometry is not consistent with reversibility.  There is no hyperinflation.  There is no air-trapping.  Diffusion capacity when corrected for hemoglobin is normal.  Declines in both FEV1 and TLC since 2009 with overall preservation of diffusing capacity.    PFTs 11/10/2029  FEV1 2.42L 68%  FVC 76%  No BD response  Ratio 0.66 (LLN 0.6)  DLco 46% han for hgb  Flow vol curve suggests obstruction.  Impression: no obstruction based on ratio >LLN but FV curve does suggest some obstruction. Significant decline in DLco compared to DLco.     July 2019  SIX MINUTE WALK TEST / HOME O2 EVALUATION     SpO2 at rest on RA 96%  SpO2 started to drop after 2 1/2 minutes walking. SpO2 after walking 2 1/2 minutes on RA was 88%  SpO2 after walking 6 minutes on RA was 87%  Distance covered 320.04 meters.  Recovery phase, SpO2 after 1 minute rest on RA was 87%  Recovery phase, SpO2 after 2 minute rest on RA was 97%     Impression:   Significant desaturation with activities.   Patient does qualify for O2 supplementation with activity.    Hugh Payton MD (Avi)  [facility]monary [facility]  Pager [phone]  Clinic  [phone]            Again, thank you for allowing me to participate in the care of your patient.        Sincerely,        Hugh Payton MD

## 2021-12-17 NOTE — PATIENT INSTRUCTIONS
Patient Education     Right-Sided Congestive Heart Failure (CHF)    The heart is a large muscle that pumps blood throughout the body. Blood carries oxygen to all the organs (including the brain), muscles, and skin. After the body takes the oxygen out of the blood, the blood returns to the heart. The right side of the heart collects that blood and pumps it to the lungs to get fresh oxygen. This oxygen-rich blood from the lungs then returns to the left side of the heart, where it is pumped back out to the rest of the body and the brain, starting the process all over.  Heart failure (HF) occurs when the heart muscle is unable to meet the needs of the body as a result of heart muscle weakness or stiffness, or a heart valve problem  When the right side of the heart is failing, it can t handle the blood it is getting from the rest of the body. This blood returns to the heart through veins. When too much pressure builds up in the veins, fluid leaks out into the tissues. Gravity then causes that fluid to spread to those parts of the body that are the lowest. So one of the first symptoms of heart failure is swelling in the feet and ankles. If the condition worsens, the swelling can even go up past the knees. In more severe cases, the liver and intestines may also become congested with extra fluid.  Causes of right-sided heart failure    Coronary artery disease    Heart attack in the past (heart attack is also known as acute myocardial infarction, or AMI)    High blood pressure, particularly in the pulmonary circulation    Damaged heart valve    Diabetes    Obesity    Cigarette smoking    Alcohol abuse    Increased pressure of the lungs weakening the right side of the heart, as in lung disease    Treatment  Heart failure is usually a chronic condition. The purpose of medical treatment is to improve the pumping action of the heart, and remove excess water and fluid from the body. A number of medicines can help reach this  goal, improve symptoms, and prevent the heart from becoming weaker. In some cases of severe heart failure, mechanical devices can be implanted to help with the heart's pumping function. Another major goal is to better treat the causes of heart failure, such as diabetes, high blood pressure, and your lifestyle.  Home care    Check your weight every day. A sudden increase in weight gain could mean worsening heart failure.  ? Use the same scale every day.  ? Weigh yourself at the same time every day.  ? Make sure the scale is on the floor, not on a rug.  ? Keep a record of your weight every day so your healthcare provider can see it. If you are not given a log sheet for this, keep a separate journal for this purpose.     Cut back on how much salt (sodium) you eat:  ? Your healthcare provider will tell you  what level of salt you can have daily, usually 2,000 mg or less.  ? Limit high-salt foods. These include olives, pickles, smoked meats, processed foods, and salted potato chips.  ? Don't add salt to your food at the table. Use only small amounts of salt when cooking.    Follow your healthcare provider's recommendations about how much fluid you should have.    Stop smoking.    Cut back on the amount of alcohol you drink.    Lose weight if you are overweight. The excess weight adds a lot of stress on the workload of the heart.    Stay active. Talk with your provider about an exercise program that is safe for your heart.    Keep your feet elevated to reduce swelling. Ask your provider about support hose as a preventive treatment for daytime leg swelling.    Follow your healthcare provider's instructions closely.  Besides taking your medicine as instructed, an important part of treatment is lifestyle changes. These include diet, physical activity, stopping smoking, and weight control.  Improve your diet. Often in the hospital, people are given a heart healthy diet. This includes more fresh foods, lower fat, less  processed foods, and lower salt.  Follow-up care  Follow up with your healthcare provider, or as advised. Make sure to keep any appointments that were made for you. This can help better control heart failure.  If an X-ray was done, you will be told of any new findings that may affect your care.  Call 911  Call 911 if you:    Become severely short of breath    Feel lightheaded, or feel like you might pass out or faint    Have chest pain or discomfort that's different than usual, the medicines your provider told you to use for this don't help, or the pain lasts longer than 10 to 15 minutes    You suddenly develop a rapid heart rate  When to seek medical advice  Call your healthcare provider right away if you have any of these signs. They may mean your heart failure is getting worse:    Sudden weight gain. This means more than 2 or more pounds in 1 day or 5 pounds in 1 week, or whatever weight gain you were told to report by your provider.    Trouble breathing not related to being active    New or increased swelling of your legs or ankles    Swelling or pain in your abdomen    Breathing trouble at night. This means waking up short of breath or needing more pillows to elevate your upper body to breathe.    Frequent coughing that doesn t go away    Feeling much more tired than usual  [place] last reviewed this educational content on 5/1/2018 2000-2021 [employer]. All rights reserved. This information is not intended as a substitute for professional medical care. Always follow your healthcare professional's instructions.

## 2021-12-20 ENCOUNTER — TELEPHONE (OUTPATIENT)
Dept: CARDIOLOGY | Facility: CLINIC | Age: 76
End: 2021-12-20
Payer: COMMERCIAL

## 2021-12-20 NOTE — TELEPHONE ENCOUNTER
----- Message from Lucia Reina RN sent at 12/20/2021  7:20 AM CST -----  Regarding: FW: mutual patient    ----- Message -----  From: Hugh Payton MD  Sent: 12/17/2021   1:00 PM CST  To: Lucia Reina RN, Everett Renee MD, #  Subject: RE: mutual patient                               Thanks - I sent Buck a message to just do Lasix once a day and to let your office know before making any changes.   AS  ----- Message -----  From: Domo Garrett MD  Sent: 12/17/2021  11:04 AM CST  To: Lucia Reina RN, Hugh Payton MD, #  Subject: RE: mutual patient                               Micah Barrett,    Thanks for the follow-up. I had recorded that he was taking furosemide 80 mg just once a day, however whatever he is actually taking right now, I would continue since he seemed to be doing well on it. He has follow-up scheduled with me next month. We will keep an eye out for his labs and make any adjustments if needed.     Domo  ----- Message -----  From: Hugh Payton MD  Sent: 12/17/2021  10:00 AM CST  To: Everett Renee MD, Domo Garrett MD  Subject: mutual patient                                   Dear Dr.'s Mcghee  I saw Buck in clinic today. He's doing much better. He's down 10lbs, oxygen levels are better and symptomatically he feels much better.  He has a lot of questions about Lasix. He is not sure what dose to take. I told him to take 80mg bid per Dr. Garrett's last note. I asked him to get a BMP and BNP level drawn in about a week or so.  Could I ask your office to please follow up with him regarding CHF management.  Pulmonary wise he seems to be stable..  Thanks  AS

## 2021-12-21 ENCOUNTER — DOCUMENTATION ONLY (OUTPATIENT)
Dept: ANTICOAGULATION | Facility: CLINIC | Age: 76
End: 2021-12-21
Payer: COMMERCIAL

## 2021-12-21 ENCOUNTER — TRANSFERRED RECORDS (OUTPATIENT)
Dept: HEALTH INFORMATION MANAGEMENT | Facility: CLINIC | Age: 76
End: 2021-12-21
Payer: COMMERCIAL

## 2021-12-21 DIAGNOSIS — I48.91 ATRIAL FIBRILLATION (H): Primary | ICD-10-CM

## 2021-12-21 DIAGNOSIS — I48.19 PERSISTENT ATRIAL FIBRILLATION (H): ICD-10-CM

## 2021-12-21 LAB — INR HOME MONITORING: 2.7 (ref 2–3)

## 2021-12-21 NOTE — PROGRESS NOTES
ANTICOAGULATION  MANAGEMENT-Home Monitor Managed by Exception    Buck Sinclair 76 year old male is on warfarin with therapeutic INR result. (Goal INR 2.0-3.0)    Recent labs: (last 7 days)     12/21/21  0000   INR 2.70         Previous INR was Therapeutic    Medication, diet, health changes since last INR:chart reviewed; none identified    Contacted within the last 12 weeks by phone on 10/19/2021      LUCAS Jane was NOT contacted regarding therapeutic result today per home monitoring policy manage by exception agreement.   Current warfarin dose is to be continued:     Summary  As of 12/21/2021    Full warfarin instructions:  5 mg every Mon, Thu; 2.5 mg all other days   Next INR check:  1/4/2022           ?   Gisele Prince RN  Anticoagulation Clinic  12/21/2021    _______________________________________________________________________     Anticoagulation Episode Summary     Current INR goal:  2.0-3.0   TTR:  88.7 % (1 y)   Target end date:  Indefinite   Send INR reminders to:  [facility]    Indications    Persistent Atrial Fibrillation [I48.91]  Persistent atrial fibrillation (H) [I48.19]           Comments:  home monitor talk to wife about dose Neela   [phone]         Anticoagulation Care Providers     Provider Role Specialty Phone number    Erica Hansen APRN [facility]  Family Medicine [phone]    Everett Renee MD  Cardiovascular Disease [phone]

## 2021-12-28 ENCOUNTER — TRANSFERRED RECORDS (OUTPATIENT)
Dept: HEALTH INFORMATION MANAGEMENT | Facility: CLINIC | Age: 76
End: 2021-12-28
Payer: COMMERCIAL

## 2021-12-28 ENCOUNTER — DOCUMENTATION ONLY (OUTPATIENT)
Dept: ANTICOAGULATION | Facility: CLINIC | Age: 76
End: 2021-12-28
Payer: COMMERCIAL

## 2021-12-28 DIAGNOSIS — I48.91 ATRIAL FIBRILLATION (H): Primary | ICD-10-CM

## 2021-12-28 DIAGNOSIS — I48.19 PERSISTENT ATRIAL FIBRILLATION (H): ICD-10-CM

## 2021-12-28 LAB — INR HOME MONITORING: 2.4 (ref 2–3)

## 2021-12-28 NOTE — PROGRESS NOTES
ANTICOAGULATION  MANAGEMENT-Home Monitor Managed by Exception    Buck Sinclair 76 year old male is on warfarin with therapeutic INR result. (Goal INR 2.0-3.0)    Recent labs: (last 7 days)     12/28/21  0000   INR 2.40         Previous INR was Therapeutic    Medication, diet, health changes since last INR:chart reviewed; none identified    Contacted within the last 12 weeks by phone on 10/19/2021      LUCAS Jane was NOT contacted regarding therapeutic result today per home monitoring policy manage by exception agreement.   Current warfarin dose is to be continued:     Summary  As of 12/28/2021    Full warfarin instructions:  5 mg every Mon, Thu; 2.5 mg all other days   Next INR check:  1/11/2022           ?   Gisele Prince RN  Anticoagulation Clinic  12/28/2021    _______________________________________________________________________     Anticoagulation Episode Summary     Current INR goal:  2.0-3.0   TTR:  90.3 % (1 y)   Target end date:  Indefinite   Send INR reminders to:  [facility]    Indications    Persistent Atrial Fibrillation [I48.91]  Persistent atrial fibrillation (H) [I48.19]           Comments:  Home monitor ( [employer] )managed by exception         Anticoagulation Care Providers     Provider Role Specialty Phone number    Erica Hansen APRN CNP Referring Family Medicine [phone]    Everett Renee MD  Cardiovascular Disease [phone]

## 2022-01-04 ENCOUNTER — DOCUMENTATION ONLY (OUTPATIENT)
Dept: ANTICOAGULATION | Facility: CLINIC | Age: 77
End: 2022-01-04
Payer: COMMERCIAL

## 2022-01-04 ENCOUNTER — TRANSFERRED RECORDS (OUTPATIENT)
Dept: HEALTH INFORMATION MANAGEMENT | Facility: CLINIC | Age: 77
End: 2022-01-04
Payer: COMMERCIAL

## 2022-01-04 DIAGNOSIS — I48.19 PERSISTENT ATRIAL FIBRILLATION (H): ICD-10-CM

## 2022-01-04 DIAGNOSIS — I48.91 ATRIAL FIBRILLATION (H): Primary | ICD-10-CM

## 2022-01-04 LAB — INR HOME MONITORING: 2.6 (ref 2–3)

## 2022-01-04 NOTE — PROGRESS NOTES
ANTICOAGULATION  MANAGEMENT-Home Monitor Managed by Exception    Buck JONES Sinclair 76 year old male is on warfarin with therapeutic INR result. (Goal INR 2.0-3.0)    Recent labs: (last 7 days)     01/04/22  0000   INR 2.60       Previous INR was Therapeutic  Medication, diet, health changes since last INR:chart reviewed; none identified  Contacted within the last 12 weeks by phone on 10/19/21      PLAN     Buck was NOT contacted regarding therapeutic result today per home monitoring policy manage by exception agreement.   Current warfarin dose is to be continued:     Summary  As of 1/4/2022    Full warfarin instructions:  5 mg every Mon, Thu; 2.5 mg all other days   Next INR check:                 Gisele Saul RN  Anticoagulation Clinic  1/4/2022    _______________________________________________________________________     Anticoagulation Episode Summary     Current INR goal:  2.0-3.0   TTR:  91.3 % (1 y)   Target end date:  Indefinite   Send INR reminders to:  LARISA RICHARDS    Indications    Persistent Atrial Fibrillation [I48.91]  Persistent atrial fibrillation (H) [I48.19]           Comments:  Home monitor ( [employer] )managed by exception         Anticoagulation Care Providers     Provider Role Specialty Phone number    Erica Hansen APRN CNP Referring Family Medicine [phone]    Everett Renee MD  Cardiovascular Disease [phone]

## 2022-01-05 ENCOUNTER — ANCILLARY PROCEDURE (OUTPATIENT)
Dept: CARDIOLOGY | Facility: CLINIC | Age: 77
End: 2022-01-05
Attending: INTERNAL MEDICINE
Payer: COMMERCIAL

## 2022-01-05 DIAGNOSIS — I42.8 NON-ISCHEMIC CARDIOMYOPATHY (H): ICD-10-CM

## 2022-01-05 DIAGNOSIS — Z95.810 ICD (IMPLANTABLE CARDIOVERTER-DEFIBRILLATOR) IN PLACE: ICD-10-CM

## 2022-01-05 PROCEDURE — 93295 DEV INTERROG REMOTE 1/2/MLT: CPT | Performed by: INTERNAL MEDICINE

## 2022-01-05 PROCEDURE — 93296 REM INTERROG EVL PM/IDS: CPT | Performed by: INTERNAL MEDICINE

## 2022-01-06 ENCOUNTER — OFFICE VISIT (OUTPATIENT)
Dept: CARDIOLOGY | Facility: CLINIC | Age: 77
End: 2022-01-06
Payer: OTHER MISCELLANEOUS

## 2022-01-06 VITALS
HEART RATE: 84 BPM | OXYGEN SATURATION: 77 % | RESPIRATION RATE: 16 BRPM | BODY MASS INDEX: 34.37 KG/M2 | SYSTOLIC BLOOD PRESSURE: 128 MMHG | DIASTOLIC BLOOD PRESSURE: 80 MMHG | WEIGHT: 275 LBS

## 2022-01-06 DIAGNOSIS — I48.19 PERSISTENT ATRIAL FIBRILLATION (H): Primary | ICD-10-CM

## 2022-01-06 DIAGNOSIS — Z95.810 ICD (IMPLANTABLE CARDIOVERTER-DEFIBRILLATOR) IN PLACE: Chronic | ICD-10-CM

## 2022-01-06 DIAGNOSIS — Z98.890 STATUS POST CATHETER ABLATION OF ATRIAL FIBRILLATION: Chronic | ICD-10-CM

## 2022-01-06 DIAGNOSIS — I25.84 CORONARY ARTERY DISEASE DUE TO CALCIFIED CORONARY LESION: Chronic | ICD-10-CM

## 2022-01-06 DIAGNOSIS — I50.30 NYHA CLASS 3 HEART FAILURE WITH PRESERVED EJECTION FRACTION (H): ICD-10-CM

## 2022-01-06 DIAGNOSIS — I10 ESSENTIAL HYPERTENSION: Chronic | ICD-10-CM

## 2022-01-06 DIAGNOSIS — I25.10 CORONARY ARTERY DISEASE DUE TO CALCIFIED CORONARY LESION: Chronic | ICD-10-CM

## 2022-01-06 PROCEDURE — 99215 OFFICE O/P EST HI 40 MIN: CPT | Performed by: NURSE PRACTITIONER

## 2022-01-06 NOTE — LETTER
1/6/2022    Vivek Guerrero MD  [facility] [address]  [facility] [address]    RE: Buck Sinclair       Dear Colleague,     I had the pleasure of seeing Buck Sinclair in the [facility].    HEART CARE ELECTROPHYSIOLOGY NOTE      [facility]  [phone]      Assessment/Recommendations   Assessment/Plan:  1.  Persistent Atrial Fibrillation and flutter: No recurrence of AF since cardioversion.  Continue sotalol 160 mg twice daily.  As previously discussed with Dr. Renee, will switch to a rate control strategy when he has recurrent AF.    He was reassured that atrial fibrillation is not life-threatening, but carries an increased risk for stroke.  He has a [id number] score of 5 for age >75, CAD, hypertension, HF.  Continue warfarin for stroke prophylaxis.    2.  Heart failure with preserved ejection fraction: 4 pound weight increase over the past couple of days, significant dyspnea on exertion and desaturation with activity, though much of this is due to COPD and PHN.  Mild lower extremity edema.  Increase furosemide to 80 mg twice daily.  BMP in 1 week.  Will refer to heart failure NP for further diuretic management and education.    3.  Coronary artery disease: Denies anginal symptoms.    4.  ICD in situ:  The ICD was interrogated and is functioning appropriately.  The battery is nearing HANNAH, shows estimated longevity of 3 months.  Atrial and ventricular lead sensing, impedance, and pacing thresholds are stable and within normal limits.  We reviewed that his device will send in alert and has audible tones which he recalls hearing previously once it reaches HANNAH.    5.  Hypertension: Blood pressure at target.  Continue losartan in addition to sotalol and furosemide as above.    Follow up with Heart Failure NP  Follow up with Dr. Renee on 2/18/2022     History of Present Illness/Subjective    HPI: Buck Sinclair is a 76 year old male who comes in  "today for EP device and arrhythmia follow-up.  He has a history of atrial fibrillation and flutter, sick sinus syndrome status post pacemaker implant in May 1999 with subsequent upgrade to single coil ICD on 6/1/2014 for dilated cardiomyopathy, hypertension, hyperlipidemia, coronary artery disease s/p PCI in 2017, oxygen dependent COPD, and pulmonary hypertension.  At one time he had an LVEF as low as 35% with subsequent normalization.    He has a long history of persistent atrial fibrillation and flutter since 1997 for which he has had many cardioversions over the years.  He underwent complex left and right atrial ablations in April 2011 and repeat ablation in July 2011 with subsequent recurrence of A. fib.  Prior to ablation, he failed all antiarrhythmic medications and to this intolerant of amiodarone.  Since then, he has been pretty well suppressed on sotalol.  Symptoms typically consist of progressive fatigue, dyspnea on exertion, and fluid retention.  He had recurrence of A. fib in October 2021 with corresponding increase in fluid retention despite controlled ventricular rates.  He underwent cardioversion on 11/19/2021.  After cardioversion, he had issues with atrial sensing and retrograde conduction with pacemaker reprogramming.  He continues on warfarin for stroke prophylaxis.    Salvador states that he feels \"100% better\" since cardioversion, but still does not feel well.  He continues to have significant dyspnea on exertion and desaturation with activity.  He occasionally takes an extra dose of furosemide, but not daily.  He reports his weight is up 4 pounds in the past couple of days.  He denies chest discomfort, palpitations, abdominal fullness/bloating or peripheral edema, shortness of breath, paroxysmal nocturnal dyspnea, orthopnea, lightheadedness, dizziness, pre-syncope, or syncope.  His wife inquires as to low-sodium diet.    Cardiographics (EKG personally reviewed):  EKG done 11/19/2021 shows atrial " pacing with intrinsic ventricular conduction at 65 bpm, right bundle branch block, QT/QTc interval measures 468/486 ms.    ICD Interrogation, remote 1/5/2022, single coil (MDT):  Pacing mode: MVP   Presenting rhythm: AP-VS 60 bpm  Underlying rhythm: NA  A-paced: 90.3%.  V-paced 1.7%.  Battery: estimated longevity 3 months, nearing HANNAH.  Atrial and Ventricular leads: Stable with good sensing, impedance, and pacing threshold  Arrhythmias: 0 AT/AF.  0 VT/VF.  AF [place]: 0  Histograms: Good rate distribution    ECHO done 11/12/2020:    Technical Quality: limited visualization    Normal left ventricular size.The calculated left ventricular ejection fraction is 50%. This represents a mildly decreased ejection fraction. Abnormal septal motion. Systolic and diastolic flattening of the interventricular septum consistent with right   ventricle pressure and volume overload.    Left atrial volume is severely increased.    The right ventricle is mildly dilated. TAPSE is abnormal, which is consistent with abnormal right ventricular systolic function. Pacer lead is present in the ventricle.    Moderate pulmonary hypertension present. The estimated systolic pulmonary artery pressure is 68 mmhg.    Estimated central venous pressure equal to 15 mmHg.    The ascending aorta is mildly dilated.     NM stress test done 11/7/2019:     The nuclear stress test is abnormal.     There is a small area of nontransmural infarction in the apical segment(s) of the left ventricle.     The left ventricular ejection fraction at stress is 63%.     A prior study was conducted on 7/19/2018.  This study has changes noted when compared with the prior study. The apical defect no longer demonstrates any ischemia.    I have reviewed and updated the patient's Past Medical History, Social History, Family History and Medication List.  Outside records personally reviewed.     Physical Examination  Review of Systems   Vitals: /80 (BP Location: Left  arm, Patient Position: Sitting, Cuff Size: Adult Regular)   Pulse 84   Resp 16   Wt 124.7 kg (275 lb)   SpO2 (!) 77%   BMI 34.37 kg/m    BMI= Body mass index is 34.37 kg/m .  Wt Readings from Last 3 Encounters:   01/06/22 124.7 kg (275 lb)   11/23/21 123.4 kg (272 lb)   11/19/21 122.2 kg (269 lb 8 oz)       General Appearance:   Alert, well-appearing and in no acute distress.   HEENT: Atraumatic, normocephalic.  No scleral icterus, normal conjunctivae, EOMs intact, PERRL.  Wearing a mask.  Oxygen via nasal cannula   Chest/Lungs:   Chest symmetric, spine straight.  Respirations unlabored.  Lungs are clear to auscultation.  Right subclavian pacemaker site with no sign of infection or tissue thinning   Cardiovascular:   Regular rate and rhythm.  Normal first and second heart sounds with no murmurs, rubs, or gallops; radial and posterior tibial pulses are intact, 1+ bilateral lower extremity edema.   Abdomen:  Soft, nondistended, bowel sounds present.   Extremities: No cyanosis or clubbing.   Musculoskeletal: Moves all extremities.     Skin: Warm, dry, intact.    Neurologic: Mood and affect are appropriate.  Alert and oriented to person, place, time, and situation.        ROS: 10 point ROS neg other than the symptoms noted above in the HPI.         Medical History  Surgical History Family History Social History   Past Medical History:   Diagnosis Date     Anemia      Asthma without status asthmaticus 5/5/2021     BPH (benign prostatic hyperplasia)      Cardiomyopathy (H)      COPD, group B, by GOLD 2017 classification (H)      Coronary artery disease due to calcified coronary lesion      Dyslipidemia, goal LDL below 70      Essential hypertension      History of transfusion      Persistent atrial fibrillation (H)      Pneumonia of left lower lobe due to infectious organism 10/4/2017     Skin cancer of trunk      Status post catheter ablation of atrial fibrillation 6/7/2017    PVI 4-2011 (Cryo/PVI + roof line + CTI  line) Re-do PVI 7-2011 (RFA/PVI + CFE + VIDYA + confirmed CTI line)     Ventricular tachycardia (H)      Past Surgical History:   Procedure Laterality Date     CARDIAC DEFIBRILLATOR PLACEMENT       CARDIOVERSION  07/11/2018    x20, last 2/12/15, 10/2015, 11/18/16, 6/16/17 by Lauren Foster CNP     CARDIOVERSION  07/11/2018     CARDIOVERSION  11/19/2021     COLONOSCOPY N/A 4/28/2017    Procedure: COLONOSCOPY with 2 ascending polyps and 1 transverse polyp;  Surgeon: Jose Whittington MD;  Location: [facility] GI;  Service:      CV CORONARY ANGIOGRAM N/A 9/20/2017    Procedure: Coronary Angiogram;  Surgeon: Sergio Cervantes MD;  Location: [facility] Lab;  Service:      EP ICD INSERT       FRACTURE SURGERY Left     wrist     INGUINAL HERNIA REPAIR Left 1967    while in the [place] in [facility] after 13 month in [place]     INSERT / REPLACE / REMOVE PACEMAKER       IR MISCELLANEOUS PROCEDURE  4/30/2014     OTHER SURGICAL HISTORY      left hand surgery---tendon repair     [place] ABLATE HEART DYSRHYTHM FOCUS  04/2011    Catheter Ablation Atrial Fibrillation PVI Apr 2011 (Cryo+RF-PVI + roof line + CTI line)     [place] ABLATE HEART DYSRHYTHM FOCUS  07/2011    Re-do PVI Jul 2011 (RFA-PVI + CFE + VIDYA + confirmation of CTI line)     TOTAL SHOULDER REPLACEMENT Right 03/03/2016    Dr. Abernathy of [facility]     [facility] MYERS W/O FACETEC FORAMOT/DSKC 1/2 VRT SEG, CERVICAL      Laminectomy Lumbar;  Recorded: 03/09/2012;     Family History   Problem Relation Age of Onset     Cancer Mother         leukemia     Cancer Father         bladder     Cancer Sister         breast with lung met.     Aneurysm Sister      CABG Brother      CABG Brother      Valvular heart disease Brother         valve replacement        Social History     Socioeconomic History     Marital status:      Spouse name: Not on file     Number of children: Not on file     Years of education: Not on file     Highest education level: Not on file   Occupational  History     Not on file   Tobacco Use     Smoking status: Former Smoker     Packs/day: 1.00     Years: 4.00     Pack years: 4.00     Types: Cigarettes     Quit date: 1968     Years since quittin.0     Smokeless tobacco: Never Used   Substance and Sexual Activity     Alcohol use: Yes     Alcohol/week: 2.0 standard drinks     Comment: Alcoholic Drinks/day: 1 beer per week     Drug use: No     Sexual activity: Yes     Partners: Female     Birth control/protection: Post-menopausal   Other Topics Concern     Not on file   Social History Narrative    Preloaded 2013     Social Determinants of Health     Financial Resource Strain: Not on file   Food Insecurity: Not on file   Transportation Needs: Not on file   Physical Activity: Not on file   Stress: Not on file   Social Connections: Not on file   Intimate Partner Violence: Not on file   Housing Stability: Not on file           Medications  Allergies   Current Outpatient Medications   Medication Sig Dispense Refill     acetaminophen (TYLENOL) 325 MG tablet Take 650 mg by mouth 2 times daily       albuterol (PROAIR HFA/PROVENTIL HFA/VENTOLIN HFA) 108 (90 Base) MCG/ACT inhaler Inhale 2 puffs into the lungs every 4 hours as needed for shortness of breath / dyspnea or wheezing       amoxicillin (AMOXIL) 500 MG tablet Take 500-2,000 mg by mouth once as needed (TAKE 1 HOUR PRIOR TO DENTAL APPOINTMENTS.)        Ascorbic Acid (VITAMIN C) 500 MG CAPS Take 1,000 mg by mouth daily       atorvastatin (LIPITOR) 40 MG tablet Take 40 mg by mouth daily       budesonide-formoterol (SYMBICORT) 160-4.5 MCG/ACT Inhaler Inhale 2 puffs into the lungs 2 times daily       co-enzyme Q-10 100 MG CAPS capsule Take 100 mg by mouth daily        fish oil-omega-3 fatty acids 1000 MG capsule Take 2 g by mouth 2 times daily       FLUoxetine (PROZAC) 20 MG capsule TAKE 1 CAPSULE BY MOUTH EVERY DAY       furosemide (LASIX) 80 MG tablet Take 1 tablet (80 mg) by mouth 2 times daily 90 tablet 6      losartan (COZAAR) 50 MG tablet Take 1 tablet (50 mg) by mouth daily 90 tablet 3     magnesium chloride 535 (64 Mg) MG TBEC CR tablet Take 64 mg by mouth 2 times daily       multivitamin, therapeutic (THERA-VIT) TABS tablet Take 1 tablet by mouth daily       OXYGEN-HELIUM IN 4-5 L        polyethylene glycol (MIRALAX) 17 GM/Dose powder Take 17 g by mouth daily        sotalol (BETAPACE) 160 MG tablet Take 160 mg by mouth 2 times daily       tiotropium (SPIRIVA) 18 MCG inhaled capsule Inhale 18 mcg into the lungs daily       vitamin D2 (ERGOCALCIFEROL) 46818 units (1250 mcg) capsule TAKE 1 CAPSULE BY MOUTH 2 TIMES A WEEK       warfarin ANTICOAGULANT (COUMADIN) 2.5 MG tablet Take 1 to 2 tablets (2.5 - 5 mg) daily by mouth. Adjust dose based on INR results as directed. 130 tablet 1       Allergies   Allergen Reactions     Adhesive Tape Other (See Comments)     ADHESIVE TAPE; SKIN IRRITATION  Foam tape:  Skin removed with tape removal  Skin pulled off with foam tape       Amiodarone      ADVERSE REACTION.  Sunlight sensitivity.     Lisinopril           Lab Results    Chemistry/lipid CBC Cardiac Enzymes/BNP/TSH/INR   Recent Labs   Lab Test 09/09/21  1110   CHOL 117   HDL 54   LDL 54   TRIG 43     Recent Labs   Lab Test 09/09/21  1110 10/08/20  0901 02/04/20  0859   LDL 54 56 66     Recent Labs   Lab Test 11/23/21  1137      POTASSIUM 4.5   CHLORIDE 105   CO2 25      BUN 36*   CR 1.39*   GFRESTIMATED 49*   AMBIKA 9.4     Recent Labs   Lab Test 11/23/21  1137 11/17/21  1648 10/19/21  1357   CR 1.39* 1.43* 1.10     No results for input(s): A1C in the last 53007 hours.   Recent Labs   Lab Test 11/17/21  1648   WBC 8.3   HGB 11.7*   HCT 39.1*   *        Recent Labs   Lab Test 11/17/21  1648 11/10/20  1032 10/25/18  0454   HGB 11.7* 11.9 12.1*    No results for input(s): TROPONINI in the last 42556 hours.  Recent Labs   Lab Test 12/01/20  0958   *     Recent Labs   Lab Test 07/06/18  0927   TSH  1.36     Recent Labs   Lab Test 01/04/22  0000 12/28/21  0000 12/21/21  0000   INR 2.60 2.40 2.70      56 minutes were spent on the date of encounter performing chart review, history and exam, documentation, and further activities as noted above.            Thank you for allowing me to participate in the care of your patient.      Sincerely,     GIBRAN [facility]  cc:   No referring provider defined for this encounter.

## 2022-01-06 NOTE — PATIENT INSTRUCTIONS
Buck Sinclair,    It was a pleasure to see you today at the [facility].     My recommendations after this visit include:    Take furosemide (Lasix) 80 mg twice daily every day    Labs in 1 week    Follow up with Heart Failure NP in 1 week  Follow up with Dr. Renee 2/18/2022    Lauren Foster, [facility], Electrophysiology  [phone]  EP nurses [phone]

## 2022-01-06 NOTE — PROGRESS NOTES
HEART CARE ELECTROPHYSIOLOGY NOTE      [facility]  [phone]      Assessment/Recommendations   Assessment/Plan:  1.  Persistent Atrial Fibrillation and flutter: No recurrence of AF since cardioversion.  Continue sotalol 160 mg twice daily.  As previously discussed with Dr. Renee, will switch to a rate control strategy when he has recurrent AF.    He was reassured that atrial fibrillation is not life-threatening, but carries an increased risk for stroke.  He has a [id number] score of 5 for age >75, CAD, hypertension, HF.  Continue warfarin for stroke prophylaxis.    2.  Heart failure with preserved ejection fraction: 4 pound weight increase over the past couple of days, significant dyspnea on exertion and desaturation with activity, though much of this is due to COPD and PHN.  Mild lower extremity edema.  Increase furosemide to 80 mg twice daily.  BMP in 1 week.  Will refer to heart failure NP for further diuretic management and education.    3.  Coronary artery disease: Denies anginal symptoms.    4.  ICD in situ:  The ICD was interrogated and is functioning appropriately.  The battery is nearing HANNAH, shows estimated longevity of 3 months.  Atrial and ventricular lead sensing, impedance, and pacing thresholds are stable and within normal limits.  We reviewed that his device will send in alert and has audible tones which he recalls hearing previously once it reaches HANNAH.    5.  Hypertension: Blood pressure at target.  Continue losartan in addition to sotalol and furosemide as above.    Follow up with Heart Failure NP  Follow up with Dr. Renee on 2/18/2022     History of Present Illness/Subjective    HPI: Buck Sinclair is a 76 year old male who comes in today for EP device and arrhythmia follow-up.  He has a history of atrial fibrillation and flutter, sick sinus syndrome status post pacemaker implant in May 1999 with subsequent upgrade to single coil ICD on 6/1/2014 for dilated  "cardiomyopathy, hypertension, hyperlipidemia, coronary artery disease s/p PCI in 2017, oxygen dependent COPD, and pulmonary hypertension.  At one time he had an LVEF as low as 35% with subsequent normalization.    He has a long history of persistent atrial fibrillation and flutter since 1997 for which he has had many cardioversions over the years.  He underwent complex left and right atrial ablations in April 2011 and repeat ablation in July 2011 with subsequent recurrence of A. fib.  Prior to ablation, he failed all antiarrhythmic medications and to this intolerant of amiodarone.  Since then, he has been pretty well suppressed on sotalol.  Symptoms typically consist of progressive fatigue, dyspnea on exertion, and fluid retention.  He had recurrence of A. fib in October 2021 with corresponding increase in fluid retention despite controlled ventricular rates.  He underwent cardioversion on 11/19/2021.  After cardioversion, he had issues with atrial sensing and retrograde conduction with pacemaker reprogramming.  He continues on warfarin for stroke prophylaxis.    Salvador states that he feels \"100% better\" since cardioversion, but still does not feel well.  He continues to have significant dyspnea on exertion and desaturation with activity.  He occasionally takes an extra dose of furosemide, but not daily.  He reports his weight is up 4 pounds in the past couple of days.  He denies chest discomfort, palpitations, abdominal fullness/bloating or peripheral edema, shortness of breath, paroxysmal nocturnal dyspnea, orthopnea, lightheadedness, dizziness, pre-syncope, or syncope.  His wife inquires as to low-sodium diet.    Cardiographics (EKG personally reviewed):  EKG done 11/19/2021 shows atrial pacing with intrinsic ventricular conduction at 65 bpm, right bundle branch block, QT/QTc interval measures 468/486 ms.    ICD Interrogation, remote 1/5/2022, single coil (MDT):  Pacing mode: MVP   Presenting rhythm: AP-VS 60 " bpm  Underlying rhythm: NA  A-paced: 90.3%.  V-paced 1.7%.  Battery: estimated longevity 3 months, nearing HANNAH.  Atrial and Ventricular leads: Stable with good sensing, impedance, and pacing threshold  Arrhythmias: 0 AT/AF.  0 VT/VF.  AF [place]: 0  Histograms: Good rate distribution    ECHO done 11/12/2020:    Technical Quality: limited visualization    Normal left ventricular size.The calculated left ventricular ejection fraction is 50%. This represents a mildly decreased ejection fraction. Abnormal septal motion. Systolic and diastolic flattening of the interventricular septum consistent with right   ventricle pressure and volume overload.    Left atrial volume is severely increased.    The right ventricle is mildly dilated. TAPSE is abnormal, which is consistent with abnormal right ventricular systolic function. Pacer lead is present in the ventricle.    Moderate pulmonary hypertension present. The estimated systolic pulmonary artery pressure is 68 mmhg.    Estimated central venous pressure equal to 15 mmHg.    The ascending aorta is mildly dilated.     NM stress test done 11/7/2019:     The nuclear stress test is abnormal.     There is a small area of nontransmural infarction in the apical segment(s) of the left ventricle.     The left ventricular ejection fraction at stress is 63%.     A prior study was conducted on 7/19/2018.  This study has changes noted when compared with the prior study. The apical defect no longer demonstrates any ischemia.    I have reviewed and updated the patient's Past Medical History, Social History, Family History and Medication List.  Outside records personally reviewed.     Physical Examination  Review of Systems   Vitals: /80 (BP Location: Left arm, Patient Position: Sitting, Cuff Size: Adult Regular)   Pulse 84   Resp 16   Wt 124.7 kg (275 lb)   SpO2 (!) 77%   BMI 34.37 kg/m    BMI= Body mass index is 34.37 kg/m .  Wt Readings from Last 3 Encounters:   01/06/22 124.7  kg (275 lb)   11/23/21 123.4 kg (272 lb)   11/19/21 122.2 kg (269 lb 8 oz)       General Appearance:   Alert, well-appearing and in no acute distress.   HEENT: Atraumatic, normocephalic.  No scleral icterus, normal conjunctivae, EOMs intact, PERRL.  Wearing a mask.  Oxygen via nasal cannula   Chest/Lungs:   Chest symmetric, spine straight.  Respirations unlabored.  Lungs are clear to auscultation.  Right subclavian pacemaker site with no sign of infection or tissue thinning   Cardiovascular:   Regular rate and rhythm.  Normal first and second heart sounds with no murmurs, rubs, or gallops; radial and posterior tibial pulses are intact, 1+ bilateral lower extremity edema.   Abdomen:  Soft, nondistended, bowel sounds present.   Extremities: No cyanosis or clubbing.   Musculoskeletal: Moves all extremities.     Skin: Warm, dry, intact.    Neurologic: Mood and affect are appropriate.  Alert and oriented to person, place, time, and situation.        ROS: 10 point ROS neg other than the symptoms noted above in the HPI.         Medical History  Surgical History Family History Social History   Past Medical History:   Diagnosis Date     Anemia      Asthma without status asthmaticus 5/5/2021     BPH (benign prostatic hyperplasia)      Cardiomyopathy (H)      COPD, group B, by GOLD 2017 classification (H)      Coronary artery disease due to calcified coronary lesion      Dyslipidemia, goal LDL below 70      Essential hypertension      History of transfusion      Persistent atrial fibrillation (H)      Pneumonia of left lower lobe due to infectious organism 10/4/2017     Skin cancer of trunk      Status post catheter ablation of atrial fibrillation 6/7/2017    PVI 4-2011 (Cryo/PVI + roof line + CTI line) Re-do PVI 7-2011 (RFA/PVI + CFE + VIDYA + confirmed CTI line)     Ventricular tachycardia (H)      Past Surgical History:   Procedure Laterality Date     CARDIAC DEFIBRILLATOR PLACEMENT       CARDIOVERSION  07/11/2018    x20,  last 2/12/15, 10/2015, 16, 17 by Lauren Foster CNP     CARDIOVERSION  2018     CARDIOVERSION  2021     COLONOSCOPY N/A 2017    Procedure: COLONOSCOPY with 2 ascending polyps and 1 transverse polyp;  Surgeon: Jose Whittington MD;  Location: [facility];  Service:      CV CORONARY ANGIOGRAM N/A 2017    Procedure: Coronary Angiogram;  Surgeon: Sergio Cervantes MD;  Location: [facility];  Service:      EP ICD INSERT       FRACTURE SURGERY Left     wrist     INGUINAL HERNIA REPAIR Left     while in [employer] in [employer] after 13 month in [place]     INSERT / REPLACE / REMOVE PACEMAKER       IR MISCELLANEOUS PROCEDURE  2014     OTHER SURGICAL HISTORY      left hand surgery---tendon repair     DC ABLATE HEART DYSRHYTHM FOCUS  2011    Catheter Ablation Atrial Fibrillation PVI 2011 (Cryo+RF-PVI + roof line + CTI line)     DC ABLATE HEART DYSRHYTHM FOCUS  2011    Re-do PVI 2011 (RFA-PVI + CFE + VIDYA + confirmation of CTI line)     TOTAL SHOULDER REPLACEMENT Right 2016    Dr. Abernathy of [facility]     [facility] W/O FACETEC FORAMOT/[facility]  VRT SEG, CERVICAL      Laminectomy Lumbar;  Recorded: 2012;     Family History   Problem Relation Age of Onset     Cancer Mother         leukemia     Cancer Father         bladder     Cancer Sister         breast with lung met.     Aneurysm Sister      CABG Brother      CABG Brother      Valvular heart disease Brother         valve replacement        Social History     Socioeconomic History     Marital status:      Spouse name: Not on file     Number of children: Not on file     Years of education: Not on file     Highest education level: Not on file   Occupational History     Not on file   Tobacco Use     Smoking status: Former Smoker     Packs/day: 1.00     Years: 4.00     Pack years: 4.00     Types: Cigarettes     Quit date: 1968     Years since quittin.0     Smokeless tobacco: Never  Used   Substance and Sexual Activity     Alcohol use: Yes     Alcohol/week: 2.0 standard drinks     Comment: Alcoholic Drinks/day: 1 beer per week     Drug use: No     Sexual activity: Yes     Partners: Female     Birth control/protection: Post-menopausal   Other Topics Concern     Not on file   Social History Narrative    Preloaded 01/14/2013     Social Determinants of Health     Financial Resource Strain: Not on file   Food Insecurity: Not on file   Transportation Needs: Not on file   Physical Activity: Not on file   Stress: Not on file   Social Connections: Not on file   Intimate Partner Violence: Not on file   Housing Stability: Not on file           Medications  Allergies   Current Outpatient Medications   Medication Sig Dispense Refill     acetaminophen (TYLENOL) 325 MG tablet Take 650 mg by mouth 2 times daily       albuterol (PROAIR HFA/PROVENTIL HFA/VENTOLIN HFA) 108 (90 Base) MCG/ACT inhaler Inhale 2 puffs into the lungs every 4 hours as needed for shortness of breath / dyspnea or wheezing       amoxicillin (AMOXIL) 500 MG tablet Take 500-2,000 mg by mouth once as needed (TAKE 1 HOUR PRIOR TO DENTAL APPOINTMENTS.)        Ascorbic Acid (VITAMIN C) 500 MG CAPS Take 1,000 mg by mouth daily       atorvastatin (LIPITOR) 40 MG tablet Take 40 mg by mouth daily       budesonide-formoterol (SYMBICORT) 160-4.5 MCG/ACT Inhaler Inhale 2 puffs into the lungs 2 times daily       co-enzyme Q-10 100 MG CAPS capsule Take 100 mg by mouth daily        fish oil-omega-3 fatty acids 1000 MG capsule Take 2 g by mouth 2 times daily       FLUoxetine (PROZAC) 20 MG capsule TAKE 1 CAPSULE BY MOUTH EVERY DAY       furosemide (LASIX) 80 MG tablet Take 1 tablet (80 mg) by mouth 2 times daily 90 tablet 6     losartan (COZAAR) 50 MG tablet Take 1 tablet (50 mg) by mouth daily 90 tablet 3     magnesium chloride 535 (64 Mg) MG TBEC CR tablet Take 64 mg by mouth 2 times daily       multivitamin, therapeutic (THERA-VIT) TABS tablet Take 1  tablet by mouth daily       OXYGEN-HELIUM IN 4-5 L        polyethylene glycol (MIRALAX) 17 GM/Dose powder Take 17 g by mouth daily        sotalol (BETAPACE) 160 MG tablet Take 160 mg by mouth 2 times daily       tiotropium (SPIRIVA) 18 MCG inhaled capsule Inhale 18 mcg into the lungs daily       vitamin D2 (ERGOCALCIFEROL) 08174 units (1250 mcg) capsule TAKE 1 CAPSULE BY MOUTH 2 TIMES A WEEK       warfarin ANTICOAGULANT (COUMADIN) 2.5 MG tablet Take 1 to 2 tablets (2.5 - 5 mg) daily by mouth. Adjust dose based on INR results as directed. 130 tablet 1       Allergies   Allergen Reactions     Adhesive Tape Other (See Comments)     ADHESIVE TAPE; SKIN IRRITATION  Foam tape:  Skin removed with tape removal  Skin pulled off with foam tape       Amiodarone      ADVERSE REACTION.  Sunlight sensitivity.     Lisinopril           Lab Results    Chemistry/lipid CBC Cardiac Enzymes/BNP/TSH/INR   Recent Labs   Lab Test 09/09/21  1110   CHOL 117   HDL 54   LDL 54   TRIG 43     Recent Labs   Lab Test 09/09/21  1110 10/08/20  0901 02/04/20  0859   LDL 54 56 66     Recent Labs   Lab Test 11/23/21  1137      POTASSIUM 4.5   CHLORIDE 105   CO2 25      BUN 36*   CR 1.39*   GFRESTIMATED 49*   AMBIKA 9.4     Recent Labs   Lab Test 11/23/21  1137 11/17/21  1648 10/19/21  1357   CR 1.39* 1.43* 1.10     No results for input(s): A1C in the last 21573 hours.   Recent Labs   Lab Test 11/17/21  1648   WBC 8.3   HGB 11.7*   HCT 39.1*   *        Recent Labs   Lab Test 11/17/21  1648 11/10/20  1032 10/25/18  0454   HGB 11.7* 11.9 12.1*    No results for input(s): TROPONINI in the last 43024 hours.  Recent Labs   Lab Test 12/01/20  0958   *     Recent Labs   Lab Test 07/06/18  0927   TSH 1.36     Recent Labs   Lab Test 01/04/22  0000 12/28/21  0000 12/21/21  0000   INR 2.60 2.40 2.70      56 minutes were spent on the date of encounter performing chart review, history and exam, documentation, and further activities as  noted above.

## 2022-01-07 LAB
MDC_IDC_EPISODE_DTM: NORMAL
MDC_IDC_EPISODE_DURATION: 111 S
MDC_IDC_EPISODE_DURATION: 114 S
MDC_IDC_EPISODE_DURATION: 1317 S
MDC_IDC_EPISODE_DURATION: 153 S
MDC_IDC_EPISODE_DURATION: 1650 S
MDC_IDC_EPISODE_DURATION: 182 S
MDC_IDC_EPISODE_DURATION: 1955 S
MDC_IDC_EPISODE_DURATION: 212 S
MDC_IDC_EPISODE_DURATION: 219 S
MDC_IDC_EPISODE_DURATION: 24 S
MDC_IDC_EPISODE_DURATION: 26 S
MDC_IDC_EPISODE_DURATION: 261 S
MDC_IDC_EPISODE_DURATION: 277 S
MDC_IDC_EPISODE_DURATION: 302 S
MDC_IDC_EPISODE_DURATION: 326 S
MDC_IDC_EPISODE_DURATION: 327 S
MDC_IDC_EPISODE_DURATION: 3280 S
MDC_IDC_EPISODE_DURATION: 35 S
MDC_IDC_EPISODE_DURATION: 358 S
MDC_IDC_EPISODE_DURATION: 392 S
MDC_IDC_EPISODE_DURATION: 41 S
MDC_IDC_EPISODE_DURATION: 438 S
MDC_IDC_EPISODE_DURATION: 52 S
MDC_IDC_EPISODE_DURATION: 540 S
MDC_IDC_EPISODE_DURATION: 58 S
MDC_IDC_EPISODE_DURATION: 614 S
MDC_IDC_EPISODE_DURATION: 70 S
MDC_IDC_EPISODE_DURATION: 703 S
MDC_IDC_EPISODE_DURATION: 71 S
MDC_IDC_EPISODE_DURATION: 72 S
MDC_IDC_EPISODE_DURATION: 819 S
MDC_IDC_EPISODE_DURATION: 842 S
MDC_IDC_EPISODE_DURATION: 843 S
MDC_IDC_EPISODE_DURATION: 90 S
MDC_IDC_EPISODE_DURATION: 929 S
MDC_IDC_EPISODE_ID: 1103
MDC_IDC_EPISODE_ID: 1104
MDC_IDC_EPISODE_ID: 1105
MDC_IDC_EPISODE_ID: 1106
MDC_IDC_EPISODE_ID: 1107
MDC_IDC_EPISODE_ID: 1108
MDC_IDC_EPISODE_ID: 1109
MDC_IDC_EPISODE_ID: 1110
MDC_IDC_EPISODE_ID: 1111
MDC_IDC_EPISODE_ID: 1112
MDC_IDC_EPISODE_ID: 1113
MDC_IDC_EPISODE_ID: 1114
MDC_IDC_EPISODE_ID: 1115
MDC_IDC_EPISODE_ID: 1116
MDC_IDC_EPISODE_ID: 1117
MDC_IDC_EPISODE_ID: 1118
MDC_IDC_EPISODE_ID: 1119
MDC_IDC_EPISODE_ID: 1120
MDC_IDC_EPISODE_ID: 1121
MDC_IDC_EPISODE_ID: 1122
MDC_IDC_EPISODE_ID: 1123
MDC_IDC_EPISODE_ID: 1124
MDC_IDC_EPISODE_ID: 1125
MDC_IDC_EPISODE_ID: 1126
MDC_IDC_EPISODE_ID: 1127
MDC_IDC_EPISODE_ID: 1128
MDC_IDC_EPISODE_ID: 1129
MDC_IDC_EPISODE_ID: 1130
MDC_IDC_EPISODE_ID: 1131
MDC_IDC_EPISODE_ID: 1132
MDC_IDC_EPISODE_ID: 1133
MDC_IDC_EPISODE_ID: 1134
MDC_IDC_EPISODE_ID: 1135
MDC_IDC_EPISODE_ID: 1136
MDC_IDC_EPISODE_ID: 1137
MDC_IDC_EPISODE_TYPE: NORMAL
MDC_IDC_LEAD_IMPLANT_DT: NORMAL
MDC_IDC_LEAD_IMPLANT_DT: NORMAL
MDC_IDC_LEAD_LOCATION: NORMAL
MDC_IDC_LEAD_LOCATION: NORMAL
MDC_IDC_LEAD_LOCATION_DETAIL_1: NORMAL
MDC_IDC_LEAD_LOCATION_DETAIL_1: NORMAL
MDC_IDC_LEAD_MFG: NORMAL
MDC_IDC_LEAD_MFG: NORMAL
MDC_IDC_LEAD_MODEL: NORMAL
MDC_IDC_LEAD_MODEL: NORMAL
MDC_IDC_LEAD_POLARITY_TYPE: NORMAL
MDC_IDC_LEAD_POLARITY_TYPE: NORMAL
MDC_IDC_LEAD_SERIAL: NORMAL
MDC_IDC_LEAD_SERIAL: NORMAL
MDC_IDC_MSMT_BATTERY_DTM: NORMAL
MDC_IDC_MSMT_BATTERY_REMAINING_LONGEVITY: 3 MO
MDC_IDC_MSMT_BATTERY_RRT_TRIGGER: 2.73
MDC_IDC_MSMT_BATTERY_STATUS: NORMAL
MDC_IDC_MSMT_BATTERY_VOLTAGE: 2.84 V
MDC_IDC_MSMT_CAP_CHARGE_DTM: NORMAL
MDC_IDC_MSMT_CAP_CHARGE_ENERGY: 18 J
MDC_IDC_MSMT_CAP_CHARGE_TIME: 4.71
MDC_IDC_MSMT_CAP_CHARGE_TYPE: NORMAL
MDC_IDC_MSMT_LEADCHNL_RA_IMPEDANCE_VALUE: 361 OHM
MDC_IDC_MSMT_LEADCHNL_RA_PACING_THRESHOLD_AMPLITUDE: 1.12 V
MDC_IDC_MSMT_LEADCHNL_RA_PACING_THRESHOLD_PULSEWIDTH: 0.4 MS
MDC_IDC_MSMT_LEADCHNL_RA_SENSING_INTR_AMPL: 0.25 MV
MDC_IDC_MSMT_LEADCHNL_RA_SENSING_INTR_AMPL: 0.25 MV
MDC_IDC_MSMT_LEADCHNL_RV_IMPEDANCE_VALUE: 304 OHM
MDC_IDC_MSMT_LEADCHNL_RV_IMPEDANCE_VALUE: 399 OHM
MDC_IDC_MSMT_LEADCHNL_RV_PACING_THRESHOLD_AMPLITUDE: 0.5 V
MDC_IDC_MSMT_LEADCHNL_RV_PACING_THRESHOLD_PULSEWIDTH: 0.4 MS
MDC_IDC_MSMT_LEADCHNL_RV_SENSING_INTR_AMPL: 9.12 MV
MDC_IDC_MSMT_LEADCHNL_RV_SENSING_INTR_AMPL: 9.12 MV
MDC_IDC_PG_IMPLANT_DTM: NORMAL
MDC_IDC_PG_MFG: NORMAL
MDC_IDC_PG_MODEL: NORMAL
MDC_IDC_PG_SERIAL: NORMAL
MDC_IDC_PG_TYPE: NORMAL
MDC_IDC_SESS_CLINIC_NAME: NORMAL
MDC_IDC_SESS_DTM: NORMAL
MDC_IDC_SESS_TYPE: NORMAL
MDC_IDC_SET_BRADY_AT_MODE_SWITCH_RATE: 171 {BEATS}/MIN
MDC_IDC_SET_BRADY_HYSTRATE: NORMAL
MDC_IDC_SET_BRADY_LOWRATE: 60 {BEATS}/MIN
MDC_IDC_SET_BRADY_MAX_SENSOR_RATE: 120 {BEATS}/MIN
MDC_IDC_SET_BRADY_MAX_TRACKING_RATE: 130 {BEATS}/MIN
MDC_IDC_SET_BRADY_MODE: NORMAL
MDC_IDC_SET_BRADY_PAV_DELAY_LOW: 180 MS
MDC_IDC_SET_BRADY_SAV_DELAY_LOW: 150 MS
MDC_IDC_SET_LEADCHNL_RA_PACING_AMPLITUDE: 1.75 V
MDC_IDC_SET_LEADCHNL_RA_PACING_ANODE_ELECTRODE_1: NORMAL
MDC_IDC_SET_LEADCHNL_RA_PACING_ANODE_LOCATION_1: NORMAL
MDC_IDC_SET_LEADCHNL_RA_PACING_CAPTURE_MODE: NORMAL
MDC_IDC_SET_LEADCHNL_RA_PACING_CATHODE_ELECTRODE_1: NORMAL
MDC_IDC_SET_LEADCHNL_RA_PACING_CATHODE_LOCATION_1: NORMAL
MDC_IDC_SET_LEADCHNL_RA_PACING_POLARITY: NORMAL
MDC_IDC_SET_LEADCHNL_RA_PACING_PULSEWIDTH: 0.4 MS
MDC_IDC_SET_LEADCHNL_RA_SENSING_ANODE_ELECTRODE_1: NORMAL
MDC_IDC_SET_LEADCHNL_RA_SENSING_ANODE_LOCATION_1: NORMAL
MDC_IDC_SET_LEADCHNL_RA_SENSING_CATHODE_ELECTRODE_1: NORMAL
MDC_IDC_SET_LEADCHNL_RA_SENSING_CATHODE_LOCATION_1: NORMAL
MDC_IDC_SET_LEADCHNL_RA_SENSING_POLARITY: NORMAL
MDC_IDC_SET_LEADCHNL_RA_SENSING_SENSITIVITY: 0.15 MV
MDC_IDC_SET_LEADCHNL_RV_PACING_AMPLITUDE: 1.5 V
MDC_IDC_SET_LEADCHNL_RV_PACING_ANODE_ELECTRODE_1: NORMAL
MDC_IDC_SET_LEADCHNL_RV_PACING_ANODE_LOCATION_1: NORMAL
MDC_IDC_SET_LEADCHNL_RV_PACING_CAPTURE_MODE: NORMAL
MDC_IDC_SET_LEADCHNL_RV_PACING_CATHODE_ELECTRODE_1: NORMAL
MDC_IDC_SET_LEADCHNL_RV_PACING_CATHODE_LOCATION_1: NORMAL
MDC_IDC_SET_LEADCHNL_RV_PACING_POLARITY: NORMAL
MDC_IDC_SET_LEADCHNL_RV_PACING_PULSEWIDTH: 0.4 MS
MDC_IDC_SET_LEADCHNL_RV_SENSING_ANODE_ELECTRODE_1: NORMAL
MDC_IDC_SET_LEADCHNL_RV_SENSING_ANODE_LOCATION_1: NORMAL
MDC_IDC_SET_LEADCHNL_RV_SENSING_CATHODE_ELECTRODE_1: NORMAL
MDC_IDC_SET_LEADCHNL_RV_SENSING_CATHODE_LOCATION_1: NORMAL
MDC_IDC_SET_LEADCHNL_RV_SENSING_POLARITY: NORMAL
MDC_IDC_SET_LEADCHNL_RV_SENSING_SENSITIVITY: 0.45 MV
MDC_IDC_SET_ZONE_DETECTION_BEATS_DENOMINATOR: 32 {BEATS}
MDC_IDC_SET_ZONE_DETECTION_BEATS_NUMERATOR: 24 {BEATS}
MDC_IDC_SET_ZONE_DETECTION_INTERVAL: 240 MS
MDC_IDC_SET_ZONE_DETECTION_INTERVAL: 320 MS
MDC_IDC_SET_ZONE_DETECTION_INTERVAL: 350 MS
MDC_IDC_SET_ZONE_DETECTION_INTERVAL: 360 MS
MDC_IDC_SET_ZONE_DETECTION_INTERVAL: 360 MS
MDC_IDC_SET_ZONE_TYPE: NORMAL
MDC_IDC_STAT_AT_BURDEN_PERCENT: 0.5 %
MDC_IDC_STAT_AT_DTM_END: NORMAL
MDC_IDC_STAT_AT_DTM_START: NORMAL
MDC_IDC_STAT_BRADY_AP_VP_PERCENT: 1.26 %
MDC_IDC_STAT_BRADY_AP_VS_PERCENT: 88.84 %
MDC_IDC_STAT_BRADY_AS_VP_PERCENT: 0.33 %
MDC_IDC_STAT_BRADY_AS_VS_PERCENT: 9.56 %
MDC_IDC_STAT_BRADY_DTM_END: NORMAL
MDC_IDC_STAT_BRADY_DTM_START: NORMAL
MDC_IDC_STAT_BRADY_RA_PERCENT_PACED: 89.57 %
MDC_IDC_STAT_BRADY_RV_PERCENT_PACED: 1.66 %
MDC_IDC_STAT_EPISODE_RECENT_COUNT: 0
MDC_IDC_STAT_EPISODE_RECENT_COUNT: 35
MDC_IDC_STAT_EPISODE_RECENT_COUNT_DTM_END: NORMAL
MDC_IDC_STAT_EPISODE_RECENT_COUNT_DTM_START: NORMAL
MDC_IDC_STAT_EPISODE_TOTAL_COUNT: 0
MDC_IDC_STAT_EPISODE_TOTAL_COUNT: 1
MDC_IDC_STAT_EPISODE_TOTAL_COUNT: 2107
MDC_IDC_STAT_EPISODE_TOTAL_COUNT: 6
MDC_IDC_STAT_EPISODE_TOTAL_COUNT_DTM_END: NORMAL
MDC_IDC_STAT_EPISODE_TOTAL_COUNT_DTM_START: NORMAL
MDC_IDC_STAT_EPISODE_TYPE: NORMAL
MDC_IDC_STAT_TACHYTHERAPY_ATP_DELIVERED_RECENT: 0
MDC_IDC_STAT_TACHYTHERAPY_ATP_DELIVERED_TOTAL: 0
MDC_IDC_STAT_TACHYTHERAPY_RECENT_DTM_END: NORMAL
MDC_IDC_STAT_TACHYTHERAPY_RECENT_DTM_START: NORMAL
MDC_IDC_STAT_TACHYTHERAPY_SHOCKS_ABORTED_RECENT: 0
MDC_IDC_STAT_TACHYTHERAPY_SHOCKS_ABORTED_TOTAL: 0
MDC_IDC_STAT_TACHYTHERAPY_SHOCKS_DELIVERED_RECENT: 0
MDC_IDC_STAT_TACHYTHERAPY_SHOCKS_DELIVERED_TOTAL: 2
MDC_IDC_STAT_TACHYTHERAPY_TOTAL_DTM_END: NORMAL
MDC_IDC_STAT_TACHYTHERAPY_TOTAL_DTM_START: NORMAL

## 2022-01-10 ENCOUNTER — TELEPHONE (OUTPATIENT)
Dept: CARDIOLOGY | Facility: CLINIC | Age: 77
End: 2022-01-10
Payer: COMMERCIAL

## 2022-01-10 DIAGNOSIS — I48.19 PERSISTENT ATRIAL FIBRILLATION (H): Primary | ICD-10-CM

## 2022-01-10 DIAGNOSIS — I42.8 NON-ISCHEMIC CARDIOMYOPATHY (H): ICD-10-CM

## 2022-01-10 RX ORDER — FUROSEMIDE 80 MG
80 TABLET ORAL 2 TIMES DAILY
Qty: 180 TABLET | Refills: 2 | Status: ON HOLD | OUTPATIENT
Start: 2022-01-10 | End: 2022-01-24

## 2022-01-10 NOTE — TELEPHONE ENCOUNTER
Phone call from patient's wife Neela with questions regarding his increase in Lasix as ordered by Lauren Foster on 1-6-22.    Chart reviewed and will send updated rx to his pharmacy.

## 2022-01-11 ENCOUNTER — TRANSFERRED RECORDS (OUTPATIENT)
Dept: HEALTH INFORMATION MANAGEMENT | Facility: CLINIC | Age: 77
End: 2022-01-11
Payer: COMMERCIAL

## 2022-01-11 ENCOUNTER — ANTICOAGULATION THERAPY VISIT (OUTPATIENT)
Dept: ANTICOAGULATION | Facility: CLINIC | Age: 77
End: 2022-01-11
Payer: COMMERCIAL

## 2022-01-11 DIAGNOSIS — I48.91 ATRIAL FIBRILLATION (H): Primary | ICD-10-CM

## 2022-01-11 DIAGNOSIS — I48.19 PERSISTENT ATRIAL FIBRILLATION (H): ICD-10-CM

## 2022-01-11 LAB — INR HOME MONITORING: 2.5 (ref 2–3)

## 2022-01-11 NOTE — PROGRESS NOTES
ANTICOAGULATION MANAGEMENT     Buck Sinclair 76 year old male is on warfarin with therapeutic INR result. (Goal INR 2.0-3.0)    Recent labs: (last 7 days)     01/11/22  0000   INR 2.50       ASSESSMENT     Source(s): Chart Review     Previous INR: Therapeutic last 2(+) visits  Additional findings: Resume manage by execption at next check if INR is within range     PLAN     Recommended plan for no diet, medication or health factor changes affecting INR     Dosing Instructions: Continue your current warfarin dose with next INR in 1 week       Summary  As of 1/11/2022    Full warfarin instructions:  5 mg every Mon, Thu; 2.5 mg all other days   Next INR check:               Detailed voice message left for Buck with dosing instructions and follow up date.     Patient to recheck with home meter    Education provided: Please call back if any changes to your diet, medications or how you've been taking warfarin, Resume manage by exception with home monitor. Continue to submit INR results to home monitor company.You will only be called when your result is out of range. Please call and notify [facility] if new medication started, dose missed, signs or symptoms of bleeding or clotting, or a surgery/procedure is scheduled. and Importance of notifying clinic for changes in medications; a sooner lab recheck maybe needed.    Plan made per [facility] anticoagulation protocol    Gisele Saul, RN  Anticoagulation Clinic  1/11/2022    _______________________________________________________________________     Anticoagulation Episode Summary     Current INR goal:  2.0-3.0   TTR:  91.3 % (1 y)   Target end date:  Indefinite   Send INR reminders to:  LARISA KASOTA    Indications    Persistent Atrial Fibrillation [I48.91]  Persistent atrial fibrillation (H) [I48.19]           Comments:  Home monitor ( [employer] )managed by exception         Anticoagulation Care Providers     Provider Role Specialty Phone number    Erica Hansen APRN CNP  [facility] [phone]    Everett Renee MD  Cardiovascular Disease [phone]

## 2022-01-14 ENCOUNTER — TELEPHONE (OUTPATIENT)
Dept: CARDIOLOGY | Facility: CLINIC | Age: 77
End: 2022-01-14
Payer: COMMERCIAL

## 2022-01-14 ENCOUNTER — OFFICE VISIT (OUTPATIENT)
Dept: CARDIOLOGY | Facility: CLINIC | Age: 77
End: 2022-01-14
Payer: OTHER MISCELLANEOUS

## 2022-01-14 ENCOUNTER — LAB (OUTPATIENT)
Dept: CARDIOLOGY | Facility: CLINIC | Age: 77
End: 2022-01-14
Payer: OTHER MISCELLANEOUS

## 2022-01-14 ENCOUNTER — APPOINTMENT (OUTPATIENT)
Dept: CARDIOLOGY | Facility: CLINIC | Age: 77
End: 2022-01-14
Payer: OTHER MISCELLANEOUS

## 2022-01-14 VITALS
DIASTOLIC BLOOD PRESSURE: 82 MMHG | SYSTOLIC BLOOD PRESSURE: 122 MMHG | WEIGHT: 271 LBS | HEART RATE: 91 BPM | BODY MASS INDEX: 33.87 KG/M2 | RESPIRATION RATE: 16 BRPM

## 2022-01-14 DIAGNOSIS — I48.19 PERSISTENT ATRIAL FIBRILLATION (H): Chronic | ICD-10-CM

## 2022-01-14 DIAGNOSIS — Z11.59 ENCOUNTER FOR SCREENING FOR OTHER VIRAL DISEASES: Primary | ICD-10-CM

## 2022-01-14 DIAGNOSIS — E83.42 HYPOMAGNESEMIA: ICD-10-CM

## 2022-01-14 DIAGNOSIS — I42.1 HYPERTROPHIC OBSTRUCTIVE CARDIOMYOPATHY (H): ICD-10-CM

## 2022-01-14 DIAGNOSIS — I42.1 HYPERTROPHIC OBSTRUCTIVE CARDIOMYOPATHY (H): Primary | ICD-10-CM

## 2022-01-14 DIAGNOSIS — I42.9 CARDIOMYOPATHY, UNSPECIFIED TYPE (H): Primary | ICD-10-CM

## 2022-01-14 DIAGNOSIS — I25.10 CORONARY ARTERY DISEASE DUE TO CALCIFIED CORONARY LESION: ICD-10-CM

## 2022-01-14 DIAGNOSIS — I50.30 NYHA CLASS 3 HEART FAILURE WITH PRESERVED EJECTION FRACTION (H): ICD-10-CM

## 2022-01-14 DIAGNOSIS — I10 ESSENTIAL HYPERTENSION: Chronic | ICD-10-CM

## 2022-01-14 DIAGNOSIS — I25.84 CORONARY ARTERY DISEASE DUE TO CALCIFIED CORONARY LESION: ICD-10-CM

## 2022-01-14 LAB
ANION GAP SERPL CALCULATED.3IONS-SCNC: 12 MMOL/L (ref 5–18)
BUN SERPL-MCNC: 40 MG/DL (ref 8–28)
CALCIUM SERPL-MCNC: 9.2 MG/DL (ref 8.5–10.5)
CHLORIDE BLD-SCNC: 104 MMOL/L (ref 98–107)
CO2 SERPL-SCNC: 26 MMOL/L (ref 22–31)
CREAT SERPL-MCNC: 1.59 MG/DL (ref 0.7–1.3)
GFR SERPL CREATININE-BSD FRML MDRD: 45 ML/MIN/1.73M2
GLUCOSE BLD-MCNC: 105 MG/DL (ref 70–125)
MAGNESIUM SERPL-MCNC: 2.1 MG/DL (ref 1.8–2.6)
POTASSIUM BLD-SCNC: 4.6 MMOL/L (ref 3.5–5)
SODIUM SERPL-SCNC: 142 MMOL/L (ref 136–145)

## 2022-01-14 PROCEDURE — 99215 OFFICE O/P EST HI 40 MIN: CPT | Performed by: NURSE PRACTITIONER

## 2022-01-14 PROCEDURE — 83735 ASSAY OF MAGNESIUM: CPT | Performed by: NURSE PRACTITIONER

## 2022-01-14 PROCEDURE — 80048 BASIC METABOLIC PNL TOTAL CA: CPT | Performed by: NURSE PRACTITIONER

## 2022-01-14 PROCEDURE — 36415 COLL VENOUS BLD VENIPUNCTURE: CPT | Performed by: NURSE PRACTITIONER

## 2022-01-14 RX ORDER — ELECTROLYTES/DEXTROSE
128 SOLUTION, ORAL ORAL DAILY
Qty: 180 TABLET | Refills: 1 | Status: ON HOLD | OUTPATIENT
Start: 2022-01-14 | End: 2022-01-24

## 2022-01-14 NOTE — PROGRESS NOTES
Assessment/Recommendations   Assessment:    1. Heart failure with preserved ejection fraction, NYHA class III: He has chronic dyspnea on exertion.  He does state his weight has decreased since increase of Lasix.  He denies orthopnea, PND or edema.  He is on chronic oxygen.  We discussed monitoring symptoms, following a low-sodium diet and monitoring daily weights.  We discussed possible enrollment in [employer] meal service to help with low sodium.  They met with the nurse clinician for further education.  2. Hypertension: Controlled.  Blood pressure 122/82  3. Persistent atrial fibrillation: Successful cardioversion in November.  Has remained in normal sinus rhythm.  He continues warfarin for anticoagulation.  He is also on sotalol.  4.  COPD: He is on chronic oxygen.  Dr. Payton is his pulmonologist.    Plan:  1.  continue current medications  2. BMP and magnesium pending  3. enroll in [employer] meal service  4.  Continue daily weights and low-salt diet    Buck Sinclair will follow up Dr. Garrett January 27, Dr. Renee February 18 and in the heart failure clinic in 2 months.     History of Present Illness/Subjective    Mr. Buck Sinclair is a 76 year old male seen at [facility] heart failure clinic today for continued follow-up.  His wife Neela accompanies him today.  He follows up for heart failure with preserved ejection fraction.  He had an echocardiogram December 8 which showed a normal ejection fraction.  Also showed a mild mitral regurgitation and RV mildly dilated.  Showed elevated RV systolic pressure consistent with a moderate pulmonary hypertension.  He had a successful cardioversion in November and has remained in normal rhythm.  He has a past medical history significant for hypertension, persistent atrial fibrillation, ICD, and COPD.    During the last clinic visit, his Lasix was increased to 80 mg twice a day.  His weight has decreased.  He states he has not noticed any improvement  in his dyspnea on exertion.  He has severe desaturation with activity and states his oxygen level decreases to 50%.  He states it recovers within 3 to 4 minutes.  He denies orthopnea, PND, edema or chest pain.  He denies lightheadedness, orthopnea, PND, palpitations, chest pain, abdominal fullness/bloating and lower extremity edema.      He is monitoring home weights which have decreased to 261 pounds.  He is following a low sodium diet.     ECHOCARDIOGRAM: 12/8/2021  Interpretation Summary     1. Left ventricular systolic function is normal. The left ventricle is normal  in size. There is normal left ventricular wall thickness.Left ventricular  diastolic function is normal. No regional wall motion abnormalities noted.  2. The right ventricle is mildly dilated. Mildly decreased right ventricular  systolic functio.  3. There is mild to moderate (1-2+) tricuspid regurgitation.  4. The right ventricular systolic pressure is approximated at 45.7mmHg plus  the right atrial pressure.Right ventricular systolic pressure is elevated,  consistent with moderate pulmonary hypertension. Normal RA pressure of 3 mmHg.  6. The ascending aorta is mildly dilated at 42 mm.  _________________________________________________       Physical Examination Review of Systems   /82 (BP Location: Left arm, Patient Position: Sitting, Cuff Size: Adult Large)   Pulse 91   Resp 16   Wt 122.9 kg (271 lb)   BMI 33.87 kg/m    Body mass index is 33.87 kg/m .  Wt Readings from Last 3 Encounters:   01/14/22 122.9 kg (271 lb)   01/06/22 124.7 kg (275 lb)   11/23/21 123.4 kg (272 lb)       General Appearance:   no acute distress   ENT/Mouth: membranes moist, no oral lesions or bleeding gums.      EYES:  no scleral icterus, normal conjunctivae   Neck: no carotid bruits or thyromegaly   Chest/Lungs:   lungs are clear and decreased to auscultation, no rales or wheezing, equal chest wall expansion, wearing chronic oxygen   Cardiovascular:   Regular.  Normal first and second heart sounds with no murmurs, rubs, or gallops; Jugular venous pressure normal, no edema bilaterally    Abdomen:  no organomegaly, masses, bruits, or tenderness; bowel sounds are present   Extremities: no cyanosis or clubbing   Skin: no xanthelasma, warm.    Neurologic: normal  bilateral, no tremors     Psychiatric: alert and oriented x3    Enc Vitals  BP: 122/82  Pulse: 91  Resp: 16  Weight: 122.9 kg (271 lb)                                         Medical History  Surgical History Family History Social History   Past Medical History:   Diagnosis Date     Anemia      Asthma without status asthmaticus 5/5/2021     BPH (benign prostatic hyperplasia)      Cardiomyopathy (H)      COPD, group B, by GOLD 2017 classification (H)      Coronary artery disease due to calcified coronary lesion      Dyslipidemia, goal LDL below 70      Essential hypertension      History of transfusion      Persistent atrial fibrillation (H)      Pneumonia of left lower lobe due to infectious organism 10/4/2017     Skin cancer of trunk      Status post catheter ablation of atrial fibrillation 6/7/2017    PVI 4-2011 (Cryo/PVI + roof line + CTI line) Re-do PVI 7-2011 (RFA/PVI + CFE + VIDYA + confirmed CTI line)     Ventricular tachycardia (H)     Past Surgical History:   Procedure Laterality Date     CARDIAC DEFIBRILLATOR PLACEMENT       CARDIOVERSION  07/11/2018    x20, last 2/12/15, 10/2015, 11/18/16, 6/16/17 by Lauren Foster CNP     CARDIOVERSION  07/11/2018     CARDIOVERSION  11/19/2021     COLONOSCOPY N/A 4/28/2017    Procedure: COLONOSCOPY with 2 ascending polyps and 1 transverse polyp;  Surgeon: Jose Whittington MD;  Location: [facility] GI;  Service:      CV CORONARY ANGIOGRAM N/A 9/20/2017    Procedure: Coronary Angiogram;  Surgeon: Sergio Cervantes MD;  Location: [facility];  Service:      EP ICD INSERT       FRACTURE SURGERY Left     wrist     INGUINAL HERNIA REPAIR Left 1967    while in the [employer]  in [place] after 13 month in [place]     INSERT / REPLACE / REMOVE PACEMAKER       IR MISCELLANEOUS PROCEDURE  2014     OTHER SURGICAL HISTORY      left hand surgery---tendon repair     [place] ABLATE HEART DYSRHYTHM FOCUS  2011    Catheter Ablation Atrial Fibrillation PVI 2011 (Cryo+RF-PVI + roof line + CTI line)     [place] ABLATE HEART DYSRHYTHM FOCUS  2011    Re-do PVI 2011 (RFA-PVI + CFE + VIDYA + confirmation of CTI line)     TOTAL SHOULDER REPLACEMENT Right 2016    Dr. Abernathy of [facility]     [facility] W/O FACETEC FORAMOT/[facility]  VRT SEG, CERVICAL      Laminectomy Lumbar;  Recorded: 2012;    Family History   Problem Relation Age of Onset     Cancer Mother         leukemia     Cancer Father         bladder     Cancer Sister         breast with lung met.     Aneurysm Sister      CABG Brother      CABG Brother      Valvular heart disease Brother         valve replacement    Social History     Socioeconomic History     Marital status:      Spouse name: Not on file     Number of children: Not on file     Years of education: Not on file     Highest education level: Not on file   Occupational History     Not on file   Tobacco Use     Smoking status: Former Smoker     Packs/day: 1.00     Years: 4.00     Pack years: 4.00     Types: Cigarettes     Quit date: 1968     Years since quittin.0     Smokeless tobacco: Never Used   Substance and Sexual Activity     Alcohol use: Yes     Alcohol/week: 2.0 standard drinks     Comment: Alcoholic Drinks/day: 1 beer per week     Drug use: No     Sexual activity: Yes     Partners: Female     Birth control/protection: Post-menopausal   Other Topics Concern     Not on file   Social History Narrative    Preloaded 2013     Social Determinants of Health     Financial Resource Strain: Not on file   Food Insecurity: Not on file   Transportation Needs: Not on file   Physical Activity: Not on file   Stress: Not on file   Social  Connections: Not on file   Intimate Partner Violence: Not on file   Housing Stability: Not on file          Medications  Allergies   Current Outpatient Medications   Medication Sig Dispense Refill     acetaminophen (TYLENOL) 325 MG tablet Take 650 mg by mouth 2 times daily       albuterol (PROAIR HFA/PROVENTIL HFA/VENTOLIN HFA) 108 (90 Base) MCG/ACT inhaler Inhale 2 puffs into the lungs every 4 hours as needed for shortness of breath / dyspnea or wheezing       amoxicillin (AMOXIL) 500 MG tablet Take 500-2,000 mg by mouth once as needed (TAKE 1 HOUR PRIOR TO DENTAL APPOINTMENTS.)        Ascorbic Acid (VITAMIN C) 500 MG CAPS Take 1,000 mg by mouth daily       atorvastatin (LIPITOR) 40 MG tablet Take 40 mg by mouth daily       budesonide-formoterol (SYMBICORT) 160-4.5 MCG/ACT Inhaler Inhale 2 puffs into the lungs 2 times daily       co-enzyme Q-10 100 MG CAPS capsule Take 100 mg by mouth daily        fish oil-omega-3 fatty acids 1000 MG capsule Take 2 g by mouth 2 times daily       FLUoxetine (PROZAC) 20 MG capsule TAKE 1 CAPSULE BY MOUTH EVERY DAY       furosemide (LASIX) 80 MG tablet Take 1 tablet (80 mg) by mouth 2 times daily 180 tablet 2     furosemide (LASIX) 80 MG tablet Take 1 tablet (80 mg) by mouth 2 times daily 90 tablet 6     losartan (COZAAR) 50 MG tablet Take 1 tablet (50 mg) by mouth daily 90 tablet 3     magnesium chloride 535 (64 Mg) MG TBEC CR tablet Take 64 mg by mouth 2 times daily       Magnesium Chloride 64 MG TABS Take 128 mg by mouth daily 180 tablet 1     multivitamin, therapeutic (THERA-VIT) TABS tablet Take 1 tablet by mouth daily       OXYGEN-HELIUM IN 4-5 L        polyethylene glycol (MIRALAX) 17 GM/Dose powder Take 17 g by mouth daily        sotalol (BETAPACE) 160 MG tablet Take 160 mg by mouth 2 times daily       tiotropium (SPIRIVA) 18 MCG inhaled capsule Inhale 18 mcg into the lungs daily       vitamin D2 (ERGOCALCIFEROL) 74022 units (1250 mcg) capsule TAKE 1 CAPSULE BY MOUTH 2 TIMES A  WEEK       warfarin ANTICOAGULANT (COUMADIN) 2.5 MG tablet Take 1 to 2 tablets (2.5 - 5 mg) daily by mouth. Adjust dose based on INR results as directed. 130 tablet 1    Allergies   Allergen Reactions     Adhesive Tape Other (See Comments)     ADHESIVE TAPE; SKIN IRRITATION  Foam tape:  Skin removed with tape removal  Skin pulled off with foam tape       Amiodarone      ADVERSE REACTION.  Sunlight sensitivity.     Lisinopril          Lab Results    Chemistry/lipid CBC Cardiac Enzymes/BNP/TSH/INR   Lab Results   Component Value Date    CHOL 117 09/09/2021    HDL 54 09/09/2021    TRIG 43 09/09/2021    BUN 36 (H) 11/23/2021     11/23/2021    CO2 25 11/23/2021    Lab Results   Component Value Date    WBC 8.3 11/17/2021    HGB 11.7 (L) 11/17/2021    HCT 39.1 (L) 11/17/2021     (H) 11/17/2021     11/17/2021    Lab Results   Component Value Date     (H) 12/01/2020    TSH 1.36 07/06/2018    INR 2.50 01/11/2022             This note has been dictated using voice recognition software. Any grammatical, typographical, or context distortions are unintentional and inherent to the software    40 minutes spent on the date of encounter doing chart review, review of outside records, review of test results, interpretation with above tests, patient visit, documentation and discussion with family.

## 2022-01-14 NOTE — TELEPHONE ENCOUNTER
Called and talked to patients wife Neela; outlined recommendations below from Dr. Garrett and DB. Reviewed pre-procedure checklist, no alerts. Pt is on Coumadin for Afib. Will reach out to AC RN once procedure is scheduled.     No contrast/latex allergy, no renal protocol. Pt aware they will need Covid test prior to procedure.     Mailed pre-procedure letter 1-14-22.     Case Request ordered. Message sent to scheduling.     Lenana Dawkins RN      From: Jessica Barrera APRN CNP   Sent: 1/14/2022   9:59 AM CST   To: Domo Garrett MD, Leanna Dawkins, RN     Sounds good, thank you.     Leanna,   Will you please get this ordered and call patient and inform him of Dr Garrett's recs. Thank you     Jessica   ----- Message -----   From: Domo Garrett MD   Sent: 1/14/2022   9:54 AM CST   To: GIBRAN Alamo CNP     We could do a right and left heart cath to also check his LVEDP and cors as well, as he has previous stents.     Domo   ----- Message -----   From: Jessica Barrera APRN CNP   Sent: 1/14/2022   9:41 AM CST   To: MD Dr. Gerry Browne,     Saw Buck today for follow-up and as a new heart failure patient.  His Lasix was increased to 80 mg twice a day a few weeks ago and his weight decreased.  He states his LIMA did not improve.  He also has COPD and is on chronic oxygen.  He had many questions regarding his echocardiogram and his dyspnea on exertion.  Wondering if it would be reasonable to do a right heart catheterization?  What are your thoughts?  Thank you     Jessica

## 2022-01-14 NOTE — TELEPHONE ENCOUNTER
Called pt wife Neela and reviewed lab results. Neela will insure that pt gets full 60 oz of fluid daily.     Note added to Appt with Dr. Garrett on 1-27-22 to please drawn BMP.     Leanna Dawkins RN

## 2022-01-14 NOTE — PATIENT INSTRUCTIONS
Buck Sinclair,    It was a pleasure to see you today at [facility].     My recommendations after this visit include:  - Please follow up with Dr Garrett January 27   - Please follow up with Jessica Barrera in mid March  - Please follow up with Dr Renee February 18 with a device check  - Continue current medications  - I have checked labs and will contact you with results    Jessica Barrera CNP      What is the [facility]?     The [facility] is a heart failure specialty clinic within [facility].  You will work with your cardiologist, nurse practitioner, and nurses to carefully adjust medications and learn how to live with heart failure.  The Heart Failure Program will help you:      Better understand your chronic heart condition    Feel better and avoid hospital stays    Monitoring for Symptoms      Call the Heart Failure Phone Line ([phone]) if you have any of these symptoms:     Increased shortness of breath/shortness of breath at rest    Waking up at night with difficulty breathing    Unable to lie down for sleep due to symptoms or needing to sit upright for sleep    Weight gain of 2 pounds a day for 2 days in a row OR 5 pounds in 1 week    Increased swelling in your ankles or legs    Dizziness or lightheadedness    Medications       Take your medications as prescribed    Bring all your medications in their original bottles to every appointment    Avoid non-steroidal anti-inflammatory medications (Advil, Aleve, Ibuprofen, Naprosyn, Naproxen, Celebrex)    Do not stop taking your medications or begin taking over-the-counter or herbal medications without first talking to your doctor or nurse practitioner    Diet and Lifestyle       Limit sodium/salt to 2000 mg daily   o Read food labels for sodium content  o Do not add salt when cooking or add salt at the table    Weigh yourself every day and record in your daily weight log    o Call if you gain 2 pounds a day for 2 days in a row OR 5 pounds in 1 week  o Bring daily weight log to every appointment    Stay active, pace yourself, listen to your body, and rest when tired    Elevate your legs if they are swollen. Ask about using compression/support stockings    Stop smoking    Lose weight if you are overweight    Avoid drinking alcohol or limit amount    Stay updated on your immunizations including flu and pneumonia vaccines

## 2022-01-14 NOTE — TELEPHONE ENCOUNTER
Buck Sinclair  [address]   [place] [address]  [phone] (home) [phone] (work)    Primary cardiologist:  Dr. Garrett  PCP:  Vivek Guerrero  Procedure:  RHC/LHC with possible PCI  H&P completed by:  Jessica Mcgowan MD:  ASH/MARCO  Admit date and time:  1-24-22 @ 0730  Case start time:  TBD  Ordering MD:  Dr. Garrett  Diagnosis:  CAD, HF  Anticoagulation: Coumadin  CPAP: NO  Bypass Grafts: No  Renal Issues: No  Allergies: Tape, amiodarone, lisinorpril  Diabetic?: No  Device?: Yes  Type:  ICD    Angiogram Teaching    Reason for Visit:  Patient seen for pre-procedure education in preparation for: RHC/LHC with possible PCI  Telephone call to discuss pre-procedure education in preparation for: RHC/LHC with possible PCI.     Procedure Prep:  Cardiologist note dated: 1-14-22  EKG results obtained, dated: 11-  Pertinent test results obtained - Viewable in Epic, dated: 1-14-21  Hemogram results obtained: 11-17-21  Basic Metabolic Panel results obtained: 1-14-22  Lipid Profile results obtained: 9-9-21    Patient Education  Explained indications/risks for diagnostic evaluation, including one or more of the following:  left heart catheterization and right heart catheterization  Explained indications/risks for therapeutic interventions, including one or more of the following: stent and may need clopidogrel (Plavix) form greater than 1 year.  These risks are in addition to baseline risks associated with a Diagnostic Evaluation.  Patient states understanding of procedure and risks and agrees to proceed.    Pre-procedure instructions  Patient instructed to be NPO after midnight.  Patient instructed to arrange for transportation home following procedure.  Patient instructed to have a responsible adult with them for 24 hours post-procedure.  Post-procedure follow up process.  Conscious sedation discussed.  The patient was sent the pre-procedure letter (If requested) on 1-14-22    Pre-procedure  medication instructions  Patient instructed on antiplatelet medication.  Continue medications as scheduled, with a small amount of water on the day of the procedure unless indicated.  Patient instructed to take 325 mg of Aspirin am of procedure: yes  Other medication: instructed to hold Lasix, vitamins and supplements and talk to PCP/INR RN about warfarin dosing a.m. of the procedure.    *PATIENTS RECORDS AVAILABLE IN [facility] UNLESS OTHERWISE INDICATED*    *Order set was entered on this date: 1-17-22      Patient Active Problem List   Diagnosis    Dyslipidemia, goal LDL below 70    Essential hypertension    Persistent Atrial Fibrillation    Cardiomyopathy (H)    ICD (implantable cardioverter-defibrillator) in place    Status post catheter ablation of atrial fibrillation    LIMA (dyspnea on exertion)    Coronary artery disease due to calcified coronary lesion    Hemoptysis    COPD, group B, by GOLD 2017 classification (H)    Hypertrophic obstructive cardiomyopathy (H)    Asthma without status asthmaticus    Mononeuritis    Obesity    Persistent atrial fibrillation (H)       Current Outpatient Medications   Medication Sig Dispense Refill    acetaminophen (TYLENOL) 325 MG tablet Take 650 mg by mouth 2 times daily      albuterol (PROAIR HFA/PROVENTIL HFA/VENTOLIN HFA) 108 (90 Base) MCG/ACT inhaler Inhale 2 puffs into the lungs every 4 hours as needed for shortness of breath / dyspnea or wheezing      amoxicillin (AMOXIL) 500 MG tablet Take 500-2,000 mg by mouth once as needed (TAKE 1 HOUR PRIOR TO DENTAL APPOINTMENTS.)       Ascorbic Acid (VITAMIN C) 500 MG CAPS Take 1,000 mg by mouth daily      atorvastatin (LIPITOR) 40 MG tablet Take 40 mg by mouth daily      budesonide-formoterol (SYMBICORT) 160-4.5 MCG/ACT Inhaler Inhale 2 puffs into the lungs 2 times daily      co-enzyme Q-10 100 MG CAPS capsule Take 100 mg by mouth daily       fish oil-omega-3 fatty acids 1000 MG capsule Take 2 g by mouth 2 times daily      FLUoxetine  (PROZAC) 20 MG capsule TAKE 1 CAPSULE BY MOUTH EVERY DAY      furosemide (LASIX) 80 MG tablet Take 1 tablet (80 mg) by mouth 2 times daily 180 tablet 2    furosemide (LASIX) 80 MG tablet Take 1 tablet (80 mg) by mouth 2 times daily 90 tablet 6    losartan (COZAAR) 50 MG tablet Take 1 tablet (50 mg) by mouth daily 90 tablet 3    magnesium chloride 535 (64 Mg) MG TBEC CR tablet Take 64 mg by mouth 2 times daily      Magnesium Chloride 64 MG TABS Take 128 mg by mouth daily 180 tablet 1    multivitamin, therapeutic (THERA-VIT) TABS tablet Take 1 tablet by mouth daily      OXYGEN-HELIUM IN 4-5 L       polyethylene glycol (MIRALAX) 17 GM/Dose powder Take 17 g by mouth daily       sotalol (BETAPACE) 160 MG tablet Take 160 mg by mouth 2 times daily      tiotropium (SPIRIVA) 18 MCG inhaled capsule Inhale 18 mcg into the lungs daily      vitamin D2 (ERGOCALCIFEROL) 45447 units (1250 mcg) capsule TAKE 1 CAPSULE BY MOUTH 2 TIMES A WEEK      warfarin ANTICOAGULANT (COUMADIN) 2.5 MG tablet Take 1 to 2 tablets (2.5 - 5 mg) daily by mouth. Adjust dose based on INR results as directed. 130 tablet 1       Allergies   Allergen Reactions    Adhesive Tape Other (See Comments)     ADHESIVE TAPE; SKIN IRRITATION  Foam tape:  Skin removed with tape removal  Skin pulled off with foam tape      Amiodarone      ADVERSE REACTION.  Sunlight sensitivity.    Lisinopril        Plan:  Reviewed pre-procedure education with pt and wife. All answered questions answered.   Patient ready for procedure    FENG Dawkins RN

## 2022-01-14 NOTE — H&P (VIEW-ONLY)
Assessment/Recommendations   Assessment:    1. Heart failure with preserved ejection fraction, NYHA class III: He has chronic dyspnea on exertion.  He does state his weight has decreased since increase of Lasix.  He denies orthopnea, PND or edema.  He is on chronic oxygen.  We discussed monitoring symptoms, following a low-sodium diet and monitoring daily weights.  We discussed possible enrollment in [facility] meal service to help with low sodium.  They met with the nurse clinician for further education.  2. Hypertension: Controlled.  Blood pressure 122/82  3. Persistent atrial fibrillation: Successful cardioversion in November.  Has remained in normal sinus rhythm.  He continues warfarin for anticoagulation.  He is also on sotalol.  4.  COPD: He is on chronic oxygen.  Dr. Payton is his pulmonologist.    Plan:  1.  continue current medications  2. BMP and magnesium pending  3. enroll in [facility] meal service  4.  Continue daily weights and low-salt diet    Buck Sinclair will follow up Dr. Garrett January 27, Dr. Renee February 18 and in the heart failure clinic in 2 months.     History of Present Illness/Subjective    Mr. Buck Sinclair is a 76 year old male seen at [facility] heart failure clinic today for continued follow-up.  His wife Neela accompanies him today.  He follows up for heart failure with preserved ejection fraction.  He had an echocardiogram December 8 which showed a normal ejection fraction.  Also showed a mild mitral regurgitation and RV mildly dilated.  Showed elevated RV systolic pressure consistent with a moderate pulmonary hypertension.  He had a successful cardioversion in November and has remained in normal rhythm.  He has a past medical history significant for hypertension, persistent atrial fibrillation, ICD, and COPD.    During the last clinic visit, his Lasix was increased to 80 mg twice a day.  His weight has decreased.  He states he has not noticed any improvement  in his dyspnea on exertion.  He has severe desaturation with activity and states his oxygen level decreases to 50%.  He states it recovers within 3 to 4 minutes.  He denies orthopnea, PND, edema or chest pain.  He denies lightheadedness, orthopnea, PND, palpitations, chest pain, abdominal fullness/bloating and lower extremity edema.      He is monitoring home weights which have decreased to 261 pounds.  He is following a low sodium diet.     ECHOCARDIOGRAM: 12/8/2021  Interpretation Summary     1. Left ventricular systolic function is normal. The left ventricle is normal  in size. There is normal left ventricular wall thickness.Left ventricular  diastolic function is normal. No regional wall motion abnormalities noted.  2. The right ventricle is mildly dilated. Mildly decreased right ventricular  systolic functio.  3. There is mild to moderate (1-2+) tricuspid regurgitation.  4. The right ventricular systolic pressure is approximated at 45.7mmHg plus  the right atrial pressure.Right ventricular systolic pressure is elevated,  consistent with moderate pulmonary hypertension. Normal RA pressure of 3 mmHg.  6. The ascending aorta is mildly dilated at 42 mm.  _________________________________________________       Physical Examination Review of Systems   /82 (BP Location: Left arm, Patient Position: Sitting, Cuff Size: Adult Large)   Pulse 91   Resp 16   Wt 122.9 kg (271 lb)   BMI 33.87 kg/m    Body mass index is 33.87 kg/m .  Wt Readings from Last 3 Encounters:   01/14/22 122.9 kg (271 lb)   01/06/22 124.7 kg (275 lb)   11/23/21 123.4 kg (272 lb)       General Appearance:   no acute distress   ENT/Mouth: membranes moist, no oral lesions or bleeding gums.      EYES:  no scleral icterus, normal conjunctivae   Neck: no carotid bruits or thyromegaly   Chest/Lungs:   lungs are clear and decreased to auscultation, no rales or wheezing, equal chest wall expansion, wearing chronic oxygen   Cardiovascular:   Regular.  Normal first and second heart sounds with no murmurs, rubs, or gallops; Jugular venous pressure normal, no edema bilaterally    Abdomen:  no organomegaly, masses, bruits, or tenderness; bowel sounds are present   Extremities: no cyanosis or clubbing   Skin: no xanthelasma, warm.    Neurologic: normal  bilateral, no tremors     Psychiatric: alert and oriented x3    Enc Vitals  BP: 122/82  Pulse: 91  Resp: 16  Weight: 122.9 kg (271 lb)                                         Medical History  Surgical History Family History Social History   Past Medical History:   Diagnosis Date     Anemia      Asthma without status asthmaticus 5/5/2021     BPH (benign prostatic hyperplasia)      Cardiomyopathy (H)      COPD, group B, by GOLD 2017 classification (H)      Coronary artery disease due to calcified coronary lesion      Dyslipidemia, goal LDL below 70      Essential hypertension      History of transfusion      Persistent atrial fibrillation (H)      Pneumonia of left lower lobe due to infectious organism 10/4/2017     Skin cancer of trunk      Status post catheter ablation of atrial fibrillation 6/7/2017    PVI 4-2011 (Cryo/PVI + roof line + CTI line) Re-do PVI 7-2011 (RFA/PVI + CFE + VIDYA + confirmed CTI line)     Ventricular tachycardia (H)     Past Surgical History:   Procedure Laterality Date     CARDIAC DEFIBRILLATOR PLACEMENT       CARDIOVERSION  07/11/2018    x20, last 2/12/15, 10/2015, 11/18/16, 6/16/17 by Lauren Foster CNP     CARDIOVERSION  07/11/2018     CARDIOVERSION  11/19/2021     COLONOSCOPY N/A 4/28/2017    Procedure: COLONOSCOPY with 2 ascending polyps and 1 transverse polyp;  Surgeon: Jose Whittington MD;  Location: [facility];  Service:      CV CORONARY ANGIOGRAM N/A 9/20/2017    Procedure: Coronary Angiogram;  Surgeon: Sergio Cervantes MD;  Location: [facility] Lab;  Service:      EP ICD INSERT       FRACTURE SURGERY Left     wrist     INGUINAL HERNIA REPAIR Left 1967    while in the [employer]  in [place] after 13 month in [place]     INSERT / REPLACE / REMOVE PACEMAKER       IR MISCELLANEOUS PROCEDURE  2014     OTHER SURGICAL HISTORY      left hand surgery---tendon repair     NE ABLATE HEART DYSRHYTHM FOCUS  2011    Catheter Ablation Atrial Fibrillation PVI 2011 (Cryo+RF-PVI + roof line + CTI line)     NE ABLATE HEART DYSRHYTHM FOCUS  2011    Re-do PVI 2011 (RFA-PVI + CFE + VIDYA + confirmation of CTI line)     TOTAL SHOULDER REPLACEMENT Right 2016    Dr. Abernathy of [facility]     [facility] W/O FACETEC FORAMOT/[facility]  VRT SEG, CERVICAL      Laminectomy Lumbar;  Recorded: 2012;    Family History   Problem Relation Age of Onset     Cancer Mother         leukemia     Cancer Father         bladder     Cancer Sister         breast with lung met.     Aneurysm Sister      CABG Brother      CABG Brother      Valvular heart disease Brother         valve replacement    Social History     Socioeconomic History     Marital status:      Spouse name: Not on file     Number of children: Not on file     Years of education: Not on file     Highest education level: Not on file   Occupational History     Not on file   Tobacco Use     Smoking status: Former Smoker     Packs/day: 1.00     Years: 4.00     Pack years: 4.00     Types: Cigarettes     Quit date: 1968     Years since quittin.0     Smokeless tobacco: Never Used   Substance and Sexual Activity     Alcohol use: Yes     Alcohol/week: 2.0 standard drinks     Comment: Alcoholic Drinks/day: 1 beer per week     Drug use: No     Sexual activity: Yes     Partners: Female     Birth control/protection: Post-menopausal   Other Topics Concern     Not on file   Social History Narrative    Preloaded 2013     Social Determinants of Health     Financial Resource Strain: Not on file   Food Insecurity: Not on file   Transportation Needs: Not on file   Physical Activity: Not on file   Stress: Not on file   Social  Connections: Not on file   Intimate Partner Violence: Not on file   Housing Stability: Not on file          Medications  Allergies   Current Outpatient Medications   Medication Sig Dispense Refill     acetaminophen (TYLENOL) 325 MG tablet Take 650 mg by mouth 2 times daily       albuterol (PROAIR HFA/PROVENTIL HFA/VENTOLIN HFA) 108 (90 Base) MCG/ACT inhaler Inhale 2 puffs into the lungs every 4 hours as needed for shortness of breath / dyspnea or wheezing       amoxicillin (AMOXIL) 500 MG tablet Take 500-2,000 mg by mouth once as needed (TAKE 1 HOUR PRIOR TO DENTAL APPOINTMENTS.)        Ascorbic Acid (VITAMIN C) 500 MG CAPS Take 1,000 mg by mouth daily       atorvastatin (LIPITOR) 40 MG tablet Take 40 mg by mouth daily       budesonide-formoterol (SYMBICORT) 160-4.5 MCG/ACT Inhaler Inhale 2 puffs into the lungs 2 times daily       co-enzyme Q-10 100 MG CAPS capsule Take 100 mg by mouth daily        fish oil-omega-3 fatty acids 1000 MG capsule Take 2 g by mouth 2 times daily       FLUoxetine (PROZAC) 20 MG capsule TAKE 1 CAPSULE BY MOUTH EVERY DAY       furosemide (LASIX) 80 MG tablet Take 1 tablet (80 mg) by mouth 2 times daily 180 tablet 2     furosemide (LASIX) 80 MG tablet Take 1 tablet (80 mg) by mouth 2 times daily 90 tablet 6     losartan (COZAAR) 50 MG tablet Take 1 tablet (50 mg) by mouth daily 90 tablet 3     magnesium chloride 535 (64 Mg) MG TBEC CR tablet Take 64 mg by mouth 2 times daily       Magnesium Chloride 64 MG TABS Take 128 mg by mouth daily 180 tablet 1     multivitamin, therapeutic (THERA-VIT) TABS tablet Take 1 tablet by mouth daily       OXYGEN-HELIUM IN 4-5 L        polyethylene glycol (MIRALAX) 17 GM/Dose powder Take 17 g by mouth daily        sotalol (BETAPACE) 160 MG tablet Take 160 mg by mouth 2 times daily       tiotropium (SPIRIVA) 18 MCG inhaled capsule Inhale 18 mcg into the lungs daily       vitamin D2 (ERGOCALCIFEROL) 21252 units (1250 mcg) capsule TAKE 1 CAPSULE BY MOUTH 2 TIMES A  WEEK       warfarin ANTICOAGULANT (COUMADIN) 2.5 MG tablet Take 1 to 2 tablets (2.5 - 5 mg) daily by mouth. Adjust dose based on INR results as directed. 130 tablet 1    Allergies   Allergen Reactions     Adhesive Tape Other (See Comments)     ADHESIVE TAPE; SKIN IRRITATION  Foam tape:  Skin removed with tape removal  Skin pulled off with foam tape       Amiodarone      ADVERSE REACTION.  Sunlight sensitivity.     Lisinopril          Lab Results    Chemistry/lipid CBC Cardiac Enzymes/BNP/TSH/INR   Lab Results   Component Value Date    CHOL 117 09/09/2021    HDL 54 09/09/2021    TRIG 43 09/09/2021    BUN 36 (H) 11/23/2021     11/23/2021    CO2 25 11/23/2021    Lab Results   Component Value Date    WBC 8.3 11/17/2021    HGB 11.7 (L) 11/17/2021    HCT 39.1 (L) 11/17/2021     (H) 11/17/2021     11/17/2021    Lab Results   Component Value Date     (H) 12/01/2020    TSH 1.36 07/06/2018    INR 2.50 01/11/2022             This note has been dictated using voice recognition software. Any grammatical, typographical, or context distortions are unintentional and inherent to the software    40 minutes spent on the date of encounter doing chart review, review of outside records, review of test results, interpretation with above tests, patient visit, documentation and discussion with family.

## 2022-01-14 NOTE — TELEPHONE ENCOUNTER
----- Message from GIBRAN Alamo CNP sent at 1/14/2022 11:26 AM CST -----  Please inform patient of lab results.  Kidney function slightly decreased with increase of Lasix.  Please make sure he is drinking 60 ounces of fluids.  Recommend rechecking BMP when he sees Dr. Garrett on January 27.  No changes to medications.  Thank you

## 2022-01-14 NOTE — LETTER
1/14/2022    Vivek Guerrero MD  [facility] [address]  [facility] [address]    RE: Buck Sinclair       Dear Colleague,     I had the pleasure of seeing Buck Sinclair in the [facility].        Assessment/Recommendations   Assessment:    1. Heart failure with preserved ejection fraction, NYHA class III: He has chronic dyspnea on exertion.  He does state his weight has decreased since increase of Lasix.  He denies orthopnea, PND or edema.  He is on chronic oxygen.  We discussed monitoring symptoms, following a low-sodium diet and monitoring daily weights.  We discussed possible enrollment in [facility] meal service to help with low sodium.  They met with the nurse clinician for further education.  2. Hypertension: Controlled.  Blood pressure 122/82  3. Persistent atrial fibrillation: Successful cardioversion in November.  Has remained in normal sinus rhythm.  He continues warfarin for anticoagulation.  He is also on sotalol.  4.  COPD: He is on chronic oxygen.  Dr. Payton is his pulmonologist.    Plan:  1.  continue current medications  2. BMP and magnesium pending  3. enroll in [facility] meal service  4.  Continue daily weights and low-salt diet    Buck Sinclair will follow up Dr. Garrett January 27, Dr. Renee February 18 and in the heart failure clinic in 2 months.     History of Present Illness/Subjective    Mr. Buck Sinclair is a 76 year old male seen at [facility] heart failure clinic today for continued follow-up.  His wife Neela accompanies him today.  He follows up for heart failure with preserved ejection fraction.  He had an echocardiogram December 8 which showed a normal ejection fraction.  Also showed a mild mitral regurgitation and RV mildly dilated.  Showed elevated RV systolic pressure consistent with a moderate pulmonary hypertension.  He had a successful cardioversion in November and has remained in normal rhythm.  He has a past medical  history significant for hypertension, persistent atrial fibrillation, ICD, and COPD.    During the last clinic visit, his Lasix was increased to 80 mg twice a day.  His weight has decreased.  He states he has not noticed any improvement in his dyspnea on exertion.  He has severe desaturation with activity and states his oxygen level decreases to 50%.  He states it recovers within 3 to 4 minutes.  He denies orthopnea, PND, edema or chest pain.  He denies lightheadedness, orthopnea, PND, palpitations, chest pain, abdominal fullness/bloating and lower extremity edema.      He is monitoring home weights which have decreased to 261 pounds.  He is following a low sodium diet.     ECHOCARDIOGRAM: 12/8/2021  Interpretation Summary     1. Left ventricular systolic function is normal. The left ventricle is normal  in size. There is normal left ventricular wall thickness.Left ventricular  diastolic function is normal. No regional wall motion abnormalities noted.  2. The right ventricle is mildly dilated. Mildly decreased right ventricular  systolic functio.  3. There is mild to moderate (1-2+) tricuspid regurgitation.  4. The right ventricular systolic pressure is approximated at 45.7mmHg plus  the right atrial pressure.Right ventricular systolic pressure is elevated,  consistent with moderate pulmonary hypertension. Normal RA pressure of 3 mmHg.  6. The ascending aorta is mildly dilated at 42 mm.  _________________________________________________       Physical Examination Review of Systems   /82 (BP Location: Left arm, Patient Position: Sitting, Cuff Size: Adult Large)   Pulse 91   Resp 16   Wt 122.9 kg (271 lb)   BMI 33.87 kg/m    Body mass index is 33.87 kg/m .  Wt Readings from Last 3 Encounters:   01/14/22 122.9 kg (271 lb)   01/06/22 124.7 kg (275 lb)   11/23/21 123.4 kg (272 lb)       General Appearance:   no acute distress   ENT/Mouth: membranes moist, no oral lesions or bleeding gums.      EYES:  no scleral  icterus, normal conjunctivae   Neck: no carotid bruits or thyromegaly   Chest/Lungs:   lungs are clear and decreased to auscultation, no rales or wheezing, equal chest wall expansion, wearing chronic oxygen   Cardiovascular:   Regular. Normal first and second heart sounds with no murmurs, rubs, or gallops; Jugular venous pressure normal, no edema bilaterally    Abdomen:  no organomegaly, masses, bruits, or tenderness; bowel sounds are present   Extremities: no cyanosis or clubbing   Skin: no xanthelasma, warm.    Neurologic: normal  bilateral, no tremors     Psychiatric: alert and oriented x3    Enc Vitals  BP: 122/82  Pulse: 91  Resp: 16  Weight: 122.9 kg (271 lb)                                         Medical History  Surgical History Family History Social History   Past Medical History:   Diagnosis Date     Anemia      Asthma without status asthmaticus 5/5/2021     BPH (benign prostatic hyperplasia)      Cardiomyopathy (H)      COPD, group B, by GOLD 2017 classification (H)      Coronary artery disease due to calcified coronary lesion      Dyslipidemia, goal LDL below 70      Essential hypertension      History of transfusion      Persistent atrial fibrillation (H)      Pneumonia of left lower lobe due to infectious organism 10/4/2017     Skin cancer of trunk      Status post catheter ablation of atrial fibrillation 6/7/2017    PVI 4-2011 (Cryo/PVI + roof line + CTI line) Re-do PVI 7-2011 (RFA/PVI + CFE + VIDYA + confirmed CTI line)     Ventricular tachycardia (H)     Past Surgical History:   Procedure Laterality Date     CARDIAC DEFIBRILLATOR PLACEMENT       CARDIOVERSION  07/11/2018    x20, last 2/12/15, 10/2015, 11/18/16, 6/16/17 by Lauren Foster CNP     CARDIOVERSION  07/11/2018     CARDIOVERSION  11/19/2021     COLONOSCOPY N/A 4/28/2017    Procedure: COLONOSCOPY with 2 ascending polyps and 1 transverse polyp;  Surgeon: Jose Whittington MD;  Location: [facility];  Service:      CV CORONARY ANGIOGRAM  N/A 2017    Procedure: Coronary Angiogram;  Surgeon: Sergio Cervantes MD;  Location: [facility] Lab;  Service:      EP ICD INSERT       FRACTURE SURGERY Left     wrist     INGUINAL HERNIA REPAIR Left     while in the [employer] in [place] after 13 month in [place]     INSERT / REPLACE / REMOVE PACEMAKER       IR MISCELLANEOUS PROCEDURE  2014     OTHER SURGICAL HISTORY      left hand surgery---tendon repair     NM ABLATE HEART DYSRHYTHM FOCUS  2011    Catheter Ablation Atrial Fibrillation PVI 2011 (Cryo+RF-PVI + roof line + CTI line)     NM ABLATE HEART DYSRHYTHM FOCUS  2011    Re-do PVI 2011 (RFA-PVI + CFE + VIDYA + confirmation of CTI line)     TOTAL SHOULDER REPLACEMENT Right 2016    Dr. Abernathy of [facility]     [facility] W/O FACETEC FORAMOT/[facility]  VRT SEG, CERVICAL      Laminectomy Lumbar;  Recorded: 2012;    Family History   Problem Relation Age of Onset     Cancer Mother         leukemia     Cancer Father         bladder     Cancer Sister         breast with lung met.     Aneurysm Sister      CABG Brother      CABG Brother      Valvular heart disease Brother         valve replacement    Social History     Socioeconomic History     Marital status:      Spouse name: Not on file     Number of children: Not on file     Years of education: Not on file     Highest education level: Not on file   Occupational History     Not on file   Tobacco Use     Smoking status: Former Smoker     Packs/day: 1.00     Years: 4.00     Pack years: 4.00     Types: Cigarettes     Quit date: 1968     Years since quittin.0     Smokeless tobacco: Never Used   Substance and Sexual Activity     Alcohol use: Yes     Alcohol/week: 2.0 standard drinks     Comment: Alcoholic Drinks/day: 1 beer per week     Drug use: No     Sexual activity: Yes     Partners: Female     Birth control/protection: Post-menopausal   Other Topics Concern     Not on file   Social History Narrative     Preloaded 01/14/2013     Social Determinants of Health     Financial Resource Strain: Not on file   Food Insecurity: Not on file   Transportation Needs: Not on file   Physical Activity: Not on file   Stress: Not on file   Social Connections: Not on file   Intimate Partner Violence: Not on file   Housing Stability: Not on file          Medications  Allergies   Current Outpatient Medications   Medication Sig Dispense Refill     acetaminophen (TYLENOL) 325 MG tablet Take 650 mg by mouth 2 times daily       albuterol (PROAIR HFA/PROVENTIL HFA/VENTOLIN HFA) 108 (90 Base) MCG/ACT inhaler Inhale 2 puffs into the lungs every 4 hours as needed for shortness of breath / dyspnea or wheezing       amoxicillin (AMOXIL) 500 MG tablet Take 500-2,000 mg by mouth once as needed (TAKE 1 HOUR PRIOR TO DENTAL APPOINTMENTS.)        Ascorbic Acid (VITAMIN C) 500 MG CAPS Take 1,000 mg by mouth daily       atorvastatin (LIPITOR) 40 MG tablet Take 40 mg by mouth daily       budesonide-formoterol (SYMBICORT) 160-4.5 MCG/ACT Inhaler Inhale 2 puffs into the lungs 2 times daily       co-enzyme Q-10 100 MG CAPS capsule Take 100 mg by mouth daily        fish oil-omega-3 fatty acids 1000 MG capsule Take 2 g by mouth 2 times daily       FLUoxetine (PROZAC) 20 MG capsule TAKE 1 CAPSULE BY MOUTH EVERY DAY       furosemide (LASIX) 80 MG tablet Take 1 tablet (80 mg) by mouth 2 times daily 180 tablet 2     furosemide (LASIX) 80 MG tablet Take 1 tablet (80 mg) by mouth 2 times daily 90 tablet 6     losartan (COZAAR) 50 MG tablet Take 1 tablet (50 mg) by mouth daily 90 tablet 3     magnesium chloride 535 (64 Mg) MG TBEC CR tablet Take 64 mg by mouth 2 times daily       Magnesium Chloride 64 MG TABS Take 128 mg by mouth daily 180 tablet 1     multivitamin, therapeutic (THERA-VIT) TABS tablet Take 1 tablet by mouth daily       OXYGEN-HELIUM IN 4-5 L        polyethylene glycol (MIRALAX) 17 GM/Dose powder Take 17 g by mouth daily        sotalol (BETAPACE) 160  MG tablet Take 160 mg by mouth 2 times daily       tiotropium (SPIRIVA) 18 MCG inhaled capsule Inhale 18 mcg into the lungs daily       vitamin D2 (ERGOCALCIFEROL) 39976 units (1250 mcg) capsule TAKE 1 CAPSULE BY MOUTH 2 TIMES A WEEK       warfarin ANTICOAGULANT (COUMADIN) 2.5 MG tablet Take 1 to 2 tablets (2.5 - 5 mg) daily by mouth. Adjust dose based on INR results as directed. 130 tablet 1    Allergies   Allergen Reactions     Adhesive Tape Other (See Comments)     ADHESIVE TAPE; SKIN IRRITATION  Foam tape:  Skin removed with tape removal  Skin pulled off with foam tape       Amiodarone      ADVERSE REACTION.  Sunlight sensitivity.     Lisinopril          Lab Results    Chemistry/lipid CBC Cardiac Enzymes/BNP/TSH/INR   Lab Results   Component Value Date    CHOL 117 09/09/2021    HDL 54 09/09/2021    TRIG 43 09/09/2021    BUN 36 (H) 11/23/2021     11/23/2021    CO2 25 11/23/2021    Lab Results   Component Value Date    WBC 8.3 11/17/2021    HGB 11.7 (L) 11/17/2021    HCT 39.1 (L) 11/17/2021     (H) 11/17/2021     11/17/2021    Lab Results   Component Value Date     (H) 12/01/2020    TSH 1.36 07/06/2018    INR 2.50 01/11/2022             This note has been dictated using voice recognition software. Any grammatical, typographical, or context distortions are unintentional and inherent to the software    40 minutes spent on the date of encounter doing chart review, review of outside records, review of test results, interpretation with above tests, patient visit, documentation and discussion with family.              Patient seen in clinic for HF education  Reviewed HF Binder that includes the  HF Sx Awareness & Action plan  handout and  A [employer]er Pump  booklet and Weight log booklet highlighting :  __X_patient s type of heart failure _X__Na management in diet  __X_importance of daily wts  _X__Fluid Guidelines, if applicable  __X_medication review and importance of compliance     Instructed  patient in signs and sx of heart failure, reiterated when to call clinic - reviewed HF hotline # [phone] and after hours call # [phone].  Majority of time was spent reviewing: low sodium diet  Patient verbalized understanding of HF discussion.  Plan for f/u with continued HF education reviewed.      Signed patient up for [facility] meal delivery services.   Thank you for allowing me to participate in the care of your patient.      Sincerely,     GIBRAN Alamo [facility]  cc:   No referring provider defined for this encounter.

## 2022-01-14 NOTE — PROGRESS NOTES
Patient seen in clinic for HF education  Reviewed HF Binder that includes the  HF Sx Awareness & Action plan  handout and  A [employer]er Pump  booklet and Weight log booklet highlighting :  __X_patient s type of heart failure _X__Na management in diet  __X_importance of daily wts  _X__Fluid Guidelines, if applicable  __X_medication review and importance of compliance     Instructed patient in signs and sx of heart failure, reiterated when to call clinic - reviewed HF hotline # [phone] and after hours call # [phone].  Majority of time was spent reviewing: low sodium diet  Patient verbalized understanding of HF discussion.  Plan for f/u with continued HF education reviewed.      Signed patient up for [employer] meal delivery services.

## 2022-01-14 NOTE — TELEPHONE ENCOUNTER
----- Message from Bo Oviedo sent at 1/14/2022  2:33 PM CST -----  Regarding: RE: RHC/LHC with possible PCI  Sounds good. The procedure information recap is below. Let me know if you have any questions!     RHC/LHC POSS PCI WITH CJP/EMG     730AM ADMIT ON 1/24    H&P ON 1/14 WITH [facility]B     ALERTS: PT ON WARFARIN     COVID TESTING ON 1/20 IN [facility]     THANKS!   -BO  ----- Message -----  From: Leanna Dawkins RN  Sent: 1/14/2022   1:37 PM CST  To: Bo Oviedo  Subject: RE: RHC/LHC with possible PCI                    Hi Bo-  No alerts except pt is on Warfarin; maybe schedule for later in the week so he has time to hold med if needed.     Thanks!    Leanna TONEY   ----- Message -----  From: Bo Oviedo  Sent: 1/14/2022   1:22 PM CST  To: Leanna Dawkins RN  Subject: RE: RHC/LHC with possible PCI                    Hi Leanna,     Thanks for your message. Just a quick question, do you know if the pt has any scheduling alerts I should be aware of? Renal protocol, etc?     Thanks!   -Bo  ----- Message -----  From: Leanna Dawkins RN  Sent: 1/14/2022  11:53 AM CST  To: Bo Oviedo  Subject: RHC/LHC with possible PCI                        Hi Jitendra Jane needs to be schedule for a RHC and LHC with possible PCI. Please let me know if you need anything else.   Buck Sinclair.    Male, 76 year old, 1945    MRN:   [id number]  Phone:   [phone]

## 2022-01-14 NOTE — Clinical Note
Dr. Garrett,    Saw Buck today for follow-up and as a new heart failure patient.  His Lasix was increased to 80 mg twice a day a few weeks ago and his weight decreased.  He states his LIMA did not improve.  He also has COPD and is on chronic oxygen.  He had many questions regarding his echocardiogram and his dyspnea on exertion.  Wondering if it would be reasonable to do a right heart catheterization?  What are your thoughts?  Thank you    Jessica

## 2022-01-17 DIAGNOSIS — I25.84 CORONARY ARTERY DISEASE DUE TO CALCIFIED CORONARY LESION: Primary | ICD-10-CM

## 2022-01-17 DIAGNOSIS — I50.9 CHF (CONGESTIVE HEART FAILURE) (H): ICD-10-CM

## 2022-01-17 DIAGNOSIS — I25.10 CORONARY ARTERY DISEASE DUE TO CALCIFIED CORONARY LESION: Primary | ICD-10-CM

## 2022-01-17 RX ORDER — ASPIRIN 81 MG/1
243 TABLET, CHEWABLE ORAL ONCE
Status: CANCELLED | OUTPATIENT
Start: 2022-01-24

## 2022-01-17 RX ORDER — FENTANYL CITRATE 50 UG/ML
25 INJECTION, SOLUTION INTRAMUSCULAR; INTRAVENOUS
Status: CANCELLED | OUTPATIENT
Start: 2022-01-17

## 2022-01-17 RX ORDER — LIDOCAINE 40 MG/G
CREAM TOPICAL
Status: CANCELLED | OUTPATIENT
Start: 2022-01-17

## 2022-01-17 RX ORDER — ASPIRIN 325 MG
325 TABLET ORAL ONCE
Status: CANCELLED | OUTPATIENT
Start: 2022-01-24

## 2022-01-17 RX ORDER — SODIUM CHLORIDE 9 MG/ML
INJECTION, SOLUTION INTRAVENOUS CONTINUOUS
Status: CANCELLED | OUTPATIENT
Start: 2022-01-24

## 2022-01-17 NOTE — TELEPHONE ENCOUNTER
Pt wife, Neela requesting that pre-procedure instruction are reviewed with her again when she receives letter in the mail with instructions so she can make notes. Will plan on reaching out to pt and Neela on Wednesday to review pre-procedure instructions again.     Leanna Dawkins RN

## 2022-01-18 ENCOUNTER — DOCUMENTATION ONLY (OUTPATIENT)
Dept: ANTICOAGULATION | Facility: CLINIC | Age: 77
End: 2022-01-18
Payer: COMMERCIAL

## 2022-01-18 ENCOUNTER — TELEPHONE (OUTPATIENT)
Dept: ANTICOAGULATION | Facility: CLINIC | Age: 77
End: 2022-01-18
Payer: COMMERCIAL

## 2022-01-18 DIAGNOSIS — I48.19 PERSISTENT ATRIAL FIBRILLATION (H): ICD-10-CM

## 2022-01-18 DIAGNOSIS — I48.91 ATRIAL FIBRILLATION (H): Primary | ICD-10-CM

## 2022-01-18 LAB — INR HOME MONITORING: 2.4 (ref 2–3)

## 2022-01-18 NOTE — PROGRESS NOTES
ANTICOAGULATION  MANAGEMENT-Home Monitor Managed by Exception    Buck JONES Sinclair 76 year old male is on warfarin with therapeutic INR result. (Goal INR 2.0-3.0)    Recent labs: (last 7 days)     01/18/22  0000   INR 2.40       Previous INR was Therapeutic  Medication, diet, health changes since last INR:chart reviewed; none identified  Contacted within the last 12 weeks by phone on 1/11/22      LUCAS     Buck was NOT contacted regarding therapeutic result today per home monitoring policy manage by exception agreement.   Current warfarin dose is to be continued:     Summary  As of 1/18/2022    Full warfarin instructions:  5 mg every Mon, Thu; 2.5 mg all other days   Next INR check:  1/25/2022               Gisele Saul RN  Anticoagulation Clinic  1/18/2022    _______________________________________________________________________     Anticoagulation Episode Summary     Current INR goal:  2.0-3.0   TTR:  91.3 % (1 y)   Target end date:  Indefinite   Send INR reminders to:  LARISA RICHARDS    Indications    Persistent Atrial Fibrillation [I48.91]  Persistent atrial fibrillation (H) [I48.19]           Comments:  Home monitor ( [employer] )managed by exception         Anticoagulation Care Providers     Provider Role Specialty Phone number    Erica Hansen APRN CNP Referring Family Medicine [phone]    Everett Renee MD  Cardiovascular Disease [phone]

## 2022-01-18 NOTE — TELEPHONE ENCOUNTER
"CRISTIAN-PROCEDURAL ANTICOAGULATION  MANAGEMENT    ASSESSMENT     Warfarin interruption plan for heart cath, possible PCI on 2022.    Indication for Anticoagulation: Atrial Fibrillation      [id number] = 3 (Hypertension and Age >= 75)      Cristian-Procedure Risk stratification for thromboembolism: low (2017 ACC periprocedure pathway for NVAF Expert Consensus)    NVAF: 2017 ACC periprocedure pathway for NVAF advises NO bridge for low risk stratification ([id number] score <=4 and no prior hx of stroke, TIA or systemic embolism)     RECOMMENDATION       Pre-Procedure:  o Hold warfarin for 4 days, until after procedure startin2022  o No Bridge      Post-Procedure:  o Resume warfarin dose if okay with provider doing procedure on night of procedure, 2022 PM: 5mg or as directed by cardiology  o Recheck INR ~ 7 days after resuming warfarin         Plan based on cardiology notes  ?   Doreen Wick [facility]    SUBJECTIVE/OBJECTIVE     Buck Sinclair, a 76 year old male    Goal INR Range: 2.0-3.0     Patient bridged in past: Yes: with acute hospitalizations, last     Pertinent History: N/A    Wt Readings from Last 3 Encounters:   22 122.9 kg (271 lb)   22 124.7 kg (275 lb)   21 123.4 kg (272 lb)      Ideal body weight: 84.5 kg (186 lb 4.6 oz)  Adjusted ideal body weight: 99.9 kg (220 lb 2.8 oz)     Estimated body mass index is 33.87 kg/m  as calculated from the following:    Height as of 21: 1.905 m (6' 3\").    Weight as of 22: 122.9 kg (271 lb).    Lab Results   Component Value Date    INR 2.40 2022    INR 2.50 2022    INR 2.60 2022     Lab Results   Component Value Date    HGB 11.7 2021    HCT 39.1 2021     2021     Lab Results   Component Value Date    CR 1.59 (H) 2022    CR 1.39 (H) 2021    CR 1.43 (H) 2021     Estimated Creatinine Clearance: 55.8 mL/min (A) (based on SCr of 1.59 mg/dL (H)).  "

## 2022-01-18 NOTE — TELEPHONE ENCOUNTER
"Staff message to writer from cardiology RN: \"I just wanted to make sure that you were aware that Buck was having a right/left Heart cath with possible PCI on 1/24/22 @ 7:30 am. We were going to recommend that he hold his warfarin for 4 days prior to procedure.\"    Call to patient, he states he was under the impression that he should hold his warfarin for 4 days prior to procedure. Patient advised in the future that he needs to keep INR in the loop with upcoming procedures. Will route to the pharmacy to review.  "

## 2022-01-18 NOTE — TELEPHONE ENCOUNTER
Advised wife of procedure plan below. Wife verbalized understanding and will call back with further concerns.

## 2022-01-20 ENCOUNTER — LAB (OUTPATIENT)
Dept: LAB | Facility: CLINIC | Age: 77
End: 2022-01-20
Payer: OTHER MISCELLANEOUS

## 2022-01-20 DIAGNOSIS — Z11.59 ENCOUNTER FOR SCREENING FOR OTHER VIRAL DISEASES: ICD-10-CM

## 2022-01-20 PROCEDURE — U0005 INFEC AGEN DETEC AMPLI PROBE: HCPCS

## 2022-01-20 PROCEDURE — U0003 INFECTIOUS AGENT DETECTION BY NUCLEIC ACID (DNA OR RNA); SEVERE ACUTE RESPIRATORY SYNDROME CORONAVIRUS 2 (SARS-COV-2) (CORONAVIRUS DISEASE [COVID-19]), AMPLIFIED PROBE TECHNIQUE, MAKING USE OF HIGH THROUGHPUT TECHNOLOGIES AS DESCRIBED BY CMS-2020-01-R: HCPCS

## 2022-01-20 NOTE — TELEPHONE ENCOUNTER
Late Entry: 1-19-22 Called and talked to pt wife Neela, she has not received pre-procedure letter yet. She requests that I call back Friday to review pre-procedure information again. Confirmed with her that pt would have Covid test on 1-20-22. Pt has been in contact with INR RN Gisele who recommended pt hold Warfarin for 4 days prior to procedure.     Will call pt and wife Friday morning to review pre-procedure information.     Leanna Dawkins RN

## 2022-01-21 LAB — SARS-COV-2 RNA RESP QL NAA+PROBE: NEGATIVE

## 2022-01-21 NOTE — TELEPHONE ENCOUNTER
Called Buck and his wife Neela to review pre and post procedure instructions for RHC/LHC with possible PCI on 1/24/22. Buck has had stents in the past and they are very familiar with procedure.     See previous telephone encounter with patient instructions. In addition, writer asked that pt take temperature morning of procedure. If temp is greater than 100 they should call [facility] immediately; provided [facility] contact info. Neela verbalized understanding.      Per INR FENG Jaquez pt to hold warfarin 4 days before procedure. Will resume warfain on 1/25/21. Pt completed covid test yesterday, awaiting results.      Leanna Dawkins RN

## 2022-01-24 ENCOUNTER — HOSPITAL ENCOUNTER (OUTPATIENT)
Facility: HOSPITAL | Age: 77
Discharge: HOME | End: 2022-01-24
Attending: INTERNAL MEDICINE | Admitting: INTERNAL MEDICINE
Payer: OTHER MISCELLANEOUS

## 2022-01-24 ENCOUNTER — TELEPHONE (OUTPATIENT)
Dept: CARDIOLOGY | Facility: CLINIC | Age: 77
End: 2022-01-24

## 2022-01-24 VITALS
TEMPERATURE: 98.2 F | WEIGHT: 255.6 LBS | HEIGHT: 75 IN | RESPIRATION RATE: 20 BRPM | SYSTOLIC BLOOD PRESSURE: 127 MMHG | DIASTOLIC BLOOD PRESSURE: 72 MMHG | BODY MASS INDEX: 31.78 KG/M2 | OXYGEN SATURATION: 93 % | HEART RATE: 62 BPM

## 2022-01-24 DIAGNOSIS — I42.1 HYPERTROPHIC OBSTRUCTIVE CARDIOMYOPATHY (H): ICD-10-CM

## 2022-01-24 DIAGNOSIS — I42.9 CARDIOMYOPATHY, UNSPECIFIED TYPE (H): ICD-10-CM

## 2022-01-24 DIAGNOSIS — I25.10 CORONARY ARTERY DISEASE DUE TO CALCIFIED CORONARY LESION: ICD-10-CM

## 2022-01-24 DIAGNOSIS — I25.84 CORONARY ARTERY DISEASE DUE TO CALCIFIED CORONARY LESION: ICD-10-CM

## 2022-01-24 LAB
ABO/RH(D): NORMAL
ANION GAP SERPL CALCULATED.3IONS-SCNC: 11 MMOL/L (ref 5–18)
ANTIBODY SCREEN: NEGATIVE
ATRIAL RATE - MUSE: 63 BPM
BASE EXCESS BLDA CALC-SCNC: 1.2 MMOL/L (ref -9.6–2)
BASE EXCESS BLDV CALC-SCNC: 2.1 MMOL/L (ref -8.1–1.9)
BASE EXCESS BLDV CALC-SCNC: 2.4 MMOL/L (ref -8.1–1.9)
BUN SERPL-MCNC: 41 MG/DL (ref 8–28)
CALCIUM SERPL-MCNC: 9.2 MG/DL (ref 8.5–10.5)
CHLORIDE BLD-SCNC: 105 MMOL/L (ref 98–107)
CO2 SERPL-SCNC: 24 MMOL/L (ref 22–31)
COHGB MFR BLD: 93.2 % (ref 95–96)
CREAT SERPL-MCNC: 1.75 MG/DL (ref 0.7–1.3)
DIASTOLIC BLOOD PRESSURE - MUSE: NORMAL MMHG
ERYTHROCYTE [DISTWIDTH] IN BLOOD BY AUTOMATED COUNT: 14.8 % (ref 10–15)
GFR SERPL CREATININE-BSD FRML MDRD: 40 ML/MIN/1.73M2
GLUCOSE BLD-MCNC: 116 MG/DL (ref 70–125)
HCO3 BLDA-SCNC: 25 MMOL/L (ref 23–29)
HCO3 BLDV-SCNC: 25 MMOL/L (ref 24–30)
HCO3 BLDV-SCNC: 26 MMOL/L (ref 24–30)
HCT VFR BLD AUTO: 34.3 % (ref 40–53)
HGB BLD-MCNC: 10.6 G/DL (ref 13.3–17.7)
INR PPP: 1.67 (ref 0.85–1.15)
INTERPRETATION ECG - MUSE: NORMAL
MCH RBC QN AUTO: 31.2 PG (ref 26.5–33)
MCHC RBC AUTO-ENTMCNC: 30.9 G/DL (ref 31.5–36.5)
MCV RBC AUTO: 101 FL (ref 78–100)
P AXIS - MUSE: NORMAL DEGREES
PCO2 BLDA: 43 MM HG (ref 35–45)
PCO2 BLDV: 47 MM HG (ref 35–50)
PCO2 BLDV: 47 MM HG (ref 35–50)
PH BLDA: 7.39 [PH] (ref 7.37–7.44)
PH BLDV: 7.38 [PH] (ref 7.35–7.45)
PH BLDV: 7.38 [PH] (ref 7.35–7.45)
PLATELET # BLD AUTO: 152 10E3/UL (ref 150–450)
PO2 BLDA: 65 MM HG (ref 75–85)
PO2 BLDV: 32 MM HG (ref 25–47)
PO2 BLDV: 32 MM HG (ref 25–47)
POTASSIUM BLD-SCNC: 4 MMOL/L (ref 3.5–5)
PR INTERVAL - MUSE: NORMAL MS
QRS DURATION - MUSE: 126 MS
QT - MUSE: 524 MS
QTC - MUSE: 531 MS
R AXIS - MUSE: 85 DEGREES
RBC # BLD AUTO: 3.4 10E6/UL (ref 4.4–5.9)
SATV LHE POCT: 59 (ref 70–75)
SATV LHE POCT: 60.1 (ref 70–75)
SODIUM SERPL-SCNC: 140 MMOL/L (ref 136–145)
SPECIMEN EXPIRATION DATE: NORMAL
SYSTOLIC BLOOD PRESSURE - MUSE: NORMAL MMHG
T AXIS - MUSE: 36 DEGREES
VENTRICULAR RATE- MUSE: 62 BPM
WBC # BLD AUTO: 7.3 10E3/UL (ref 4–11)

## 2022-01-24 PROCEDURE — C1769 GUIDE WIRE: HCPCS | Performed by: INTERNAL MEDICINE

## 2022-01-24 PROCEDURE — 85027 COMPLETE CBC AUTOMATED: CPT | Performed by: GENERAL ACUTE CARE HOSPITAL

## 2022-01-24 PROCEDURE — 82805 BLOOD GASES W/O2 SATURATION: CPT

## 2022-01-24 PROCEDURE — 93010 ELECTROCARDIOGRAM REPORT: CPT | Performed by: GENERAL ACUTE CARE HOSPITAL

## 2022-01-24 PROCEDURE — 93005 ELECTROCARDIOGRAM TRACING: CPT

## 2022-01-24 PROCEDURE — 258N000003 HC RX IP 258 OP 636: Performed by: GENERAL ACUTE CARE HOSPITAL

## 2022-01-24 PROCEDURE — 86850 RBC ANTIBODY SCREEN: CPT | Performed by: GENERAL ACUTE CARE HOSPITAL

## 2022-01-24 PROCEDURE — 93460 R&L HRT ART/VENTRICLE ANGIO: CPT | Mod: 26 | Performed by: INTERNAL MEDICINE

## 2022-01-24 PROCEDURE — 85610 PROTHROMBIN TIME: CPT | Performed by: GENERAL ACUTE CARE HOSPITAL

## 2022-01-24 PROCEDURE — 80048 BASIC METABOLIC PNL TOTAL CA: CPT | Performed by: GENERAL ACUTE CARE HOSPITAL

## 2022-01-24 PROCEDURE — 255N000002 HC RX 255 OP 636: Performed by: INTERNAL MEDICINE

## 2022-01-24 PROCEDURE — 99152 MOD SED SAME PHYS/QHP 5/>YRS: CPT | Performed by: INTERNAL MEDICINE

## 2022-01-24 PROCEDURE — C1751 CATH, INF, PER/CENT/MIDLINE: HCPCS | Performed by: INTERNAL MEDICINE

## 2022-01-24 PROCEDURE — 36415 COLL VENOUS BLD VENIPUNCTURE: CPT | Performed by: GENERAL ACUTE CARE HOSPITAL

## 2022-01-24 PROCEDURE — 272N000001 HC OR GENERAL SUPPLY STERILE: Performed by: INTERNAL MEDICINE

## 2022-01-24 PROCEDURE — C1894 INTRO/SHEATH, NON-LASER: HCPCS | Performed by: INTERNAL MEDICINE

## 2022-01-24 PROCEDURE — 250N000011 HC RX IP 250 OP 636: Performed by: INTERNAL MEDICINE

## 2022-01-24 PROCEDURE — 93460 R&L HRT ART/VENTRICLE ANGIO: CPT | Performed by: INTERNAL MEDICINE

## 2022-01-24 PROCEDURE — 250N000009 HC RX 250: Performed by: INTERNAL MEDICINE

## 2022-01-24 PROCEDURE — 999N000054 HC STATISTIC EKG NON-CHARGEABLE

## 2022-01-24 RX ORDER — ASPIRIN 325 MG
325 TABLET ORAL ONCE
Status: DISCONTINUED | OUTPATIENT
Start: 2022-01-24 | End: 2022-01-24 | Stop reason: HOSPADM

## 2022-01-24 RX ORDER — FENTANYL CITRATE 50 UG/ML
25 INJECTION, SOLUTION INTRAMUSCULAR; INTRAVENOUS
Status: DISCONTINUED | OUTPATIENT
Start: 2022-01-24 | End: 2022-01-24 | Stop reason: HOSPADM

## 2022-01-24 RX ORDER — OXYCODONE HYDROCHLORIDE 5 MG/1
5 TABLET ORAL EVERY 4 HOURS PRN
Status: DISCONTINUED | OUTPATIENT
Start: 2022-01-24 | End: 2022-01-24 | Stop reason: HOSPADM

## 2022-01-24 RX ORDER — SODIUM CHLORIDE 9 MG/ML
INJECTION, SOLUTION INTRAVENOUS CONTINUOUS
Status: DISCONTINUED | OUTPATIENT
Start: 2022-01-24 | End: 2022-01-24 | Stop reason: HOSPADM

## 2022-01-24 RX ORDER — FLUMAZENIL 0.1 MG/ML
0.2 INJECTION, SOLUTION INTRAVENOUS
Status: DISCONTINUED | OUTPATIENT
Start: 2022-01-24 | End: 2022-01-24 | Stop reason: HOSPADM

## 2022-01-24 RX ORDER — IODIXANOL 320 MG/ML
INJECTION, SOLUTION INTRAVASCULAR
Status: DISCONTINUED | OUTPATIENT
Start: 2022-01-24 | End: 2022-01-24 | Stop reason: HOSPADM

## 2022-01-24 RX ORDER — NALOXONE HYDROCHLORIDE 0.4 MG/ML
0.2 INJECTION, SOLUTION INTRAMUSCULAR; INTRAVENOUS; SUBCUTANEOUS
Status: DISCONTINUED | OUTPATIENT
Start: 2022-01-24 | End: 2022-01-24 | Stop reason: HOSPADM

## 2022-01-24 RX ORDER — ATROPINE SULFATE 0.1 MG/ML
0.5 INJECTION INTRAVENOUS
Status: DISCONTINUED | OUTPATIENT
Start: 2022-01-24 | End: 2022-01-24 | Stop reason: HOSPADM

## 2022-01-24 RX ORDER — LIDOCAINE 40 MG/G
CREAM TOPICAL
Status: DISCONTINUED | OUTPATIENT
Start: 2022-01-24 | End: 2022-01-24 | Stop reason: HOSPADM

## 2022-01-24 RX ORDER — NITROGLYCERIN 0.4 MG/1
TABLET SUBLINGUAL
Qty: 25 TABLET | Refills: 3 | Status: SHIPPED | OUTPATIENT
Start: 2022-01-24

## 2022-01-24 RX ORDER — NALOXONE HYDROCHLORIDE 0.4 MG/ML
0.4 INJECTION, SOLUTION INTRAMUSCULAR; INTRAVENOUS; SUBCUTANEOUS
Status: DISCONTINUED | OUTPATIENT
Start: 2022-01-24 | End: 2022-01-24 | Stop reason: HOSPADM

## 2022-01-24 RX ORDER — FENTANYL CITRATE 50 UG/ML
INJECTION, SOLUTION INTRAMUSCULAR; INTRAVENOUS
Status: DISCONTINUED | OUTPATIENT
Start: 2022-01-24 | End: 2022-01-24 | Stop reason: HOSPADM

## 2022-01-24 RX ORDER — ASPIRIN 81 MG/1
243 TABLET, CHEWABLE ORAL ONCE
Status: DISCONTINUED | OUTPATIENT
Start: 2022-01-24 | End: 2022-01-24 | Stop reason: HOSPADM

## 2022-01-24 RX ORDER — ACETAMINOPHEN 325 MG/1
650 TABLET ORAL EVERY 4 HOURS PRN
Status: DISCONTINUED | OUTPATIENT
Start: 2022-01-24 | End: 2022-01-24 | Stop reason: HOSPADM

## 2022-01-24 RX ORDER — ASPIRIN 81 MG/1
81 TABLET, CHEWABLE ORAL ONCE
Status: DISCONTINUED | OUTPATIENT
Start: 2022-01-24 | End: 2022-01-24

## 2022-01-24 RX ORDER — OXYCODONE HYDROCHLORIDE 5 MG/1
10 TABLET ORAL EVERY 4 HOURS PRN
Status: DISCONTINUED | OUTPATIENT
Start: 2022-01-24 | End: 2022-01-24 | Stop reason: HOSPADM

## 2022-01-24 RX ADMIN — SODIUM CHLORIDE, PRESERVATIVE FREE: 5 INJECTION INTRAVENOUS at 09:02

## 2022-01-24 ASSESSMENT — MIFFLIN-ST. JEOR: SCORE: 1975.02

## 2022-01-24 ASSESSMENT — EJECTION FRACTION: EF_VALUE: .31

## 2022-01-24 NOTE — TELEPHONE ENCOUNTER
----- Message from Domo Garrett MD sent at 1/24/2022  2:19 PM CST -----  [facility] results reviewed. Findings suggest his symptoms are due to his COPD. No evidence that he has excess fluid or problems with his stents. Would keep his medications at the same doses and follow-up with his pulmonologist Dr. Payton, if his breathing is still an issue.    Domo

## 2022-01-24 NOTE — INTERVAL H&P NOTE
I have reviewed the surgical (or preoperative) H&P that is linked to this encounter, and examined the patient. Noted changes include: II/VI systolic murmur is appreciated at the apex

## 2022-01-24 NOTE — TELEPHONE ENCOUNTER
Called pt and left VM that RHC/LHC was normal from a HF standpoint.     ecommend that he follow up with Dr. Payton with Pulmonology for further management of COPD. Pt has an existing appt on 3-18-22 with pulmonary, recommended moving that appointment up to within the next month.     Routing to Jessica ABRAMS.     Leanna Dawkins RN     Alert and oriented to person, place and time

## 2022-01-24 NOTE — DISCHARGE INSTRUCTIONS
Interventional Cardiology  Coronary Angiogram/Angioplasty/Stent/Atherectomy Discharge Instructions -   Femoral Approach    The instructions below are to help you understand how to take care of yourself. There is also information about when to call the doctor or emergency services    **Do not stop your aspirin or platelet inhibitor unless directed by your Cardiologist.  These medications help to prevent platelets in your blood from sticking together and forming a clot.  Examples of these medications are:  Ticagrelor (Brilinta), Clopidigrel (Plavix), Prasugrel (Effient)          For the first 72 hours after your procedure:    No driving for 24 hours    Do not lift more than 15 pounds.  If you usually lift 50 pounds or more daily, please contact your cardiologist    Avoid any hard work or tiring activities, this includes, yard work, jogging, biking, sexual activity    During the day get up and walk around every 2 hours    You may return to work after 72 hours if you are feeling well and your job does not involve heavy lifting          Groin Site / Wound Care / Bleeding      You may take off the dressing on your groin the day after your procedure    Keep the area dry and clean    It is ok to shower with regular soap.  Pat dry, do not rub    You do not need to use a bandage    No tub/pool or hot tub for 1 week    If your groin starts to bleed or begins to swell suddenly after leaving the hospital, lie flat and apply firm pressure above the site for 15 minutes.  If bleeding continues, call 9-1-1    Bruising around the groin area is normal.  It may take 2-3 weeks for this to go away.  It is normal for the bruised area to turn green and/or yellow as it is healing.  A small lump may also be present and may last 2-3 months.    Your leg may be sore or stiff for a few days.  You may take Tylenol or a pain medicine recommended by your doctor    If you have a fever over 100.4, that lasts more than one day - call your  cardiologist.      Our Cardiac Rehab staff may visit briefly with you while your in the hospital.  If they miss you, someone will contact you after you are home.  You are encouraged to enroll in an Outpatient Cardiac Rehab Program     ? No elective dental work for 6 weeks after having a stent.     ? No driving for 24 hours      Your Procedural Physician was: {CV Physicians:[id number]}  the phone number is: [phone]      [facility]:  [phone]  If you are calling after hours, please listen to the entire voicemail, a live  will answer at the end of the message

## 2022-01-24 NOTE — Clinical Note
dry, intact, no bleeding and no hematoma. Right femoral site. Arterial and venous sheaths removed. Manual pressure held. Site covered with a band-aid.

## 2022-01-24 NOTE — PLAN OF CARE
Pt admitted for CA/P.PCI due to dyspnea with activity. Pt prepped and ready for procedure.      Christine Cardenas RN

## 2022-01-25 ENCOUNTER — DOCUMENTATION ONLY (OUTPATIENT)
Dept: ANTICOAGULATION | Facility: CLINIC | Age: 77
End: 2022-01-25
Payer: COMMERCIAL

## 2022-01-25 DIAGNOSIS — I48.19 PERSISTENT ATRIAL FIBRILLATION (H): ICD-10-CM

## 2022-01-25 DIAGNOSIS — I48.91 ATRIAL FIBRILLATION (H): Primary | ICD-10-CM

## 2022-01-25 NOTE — PROGRESS NOTES
ANTICOAGULATION  MANAGEMENT: Discharge Review    Buck Sinclair chart reviewed for anticoagulation continuity of care    Outpatient surgery/procedure on 01/24/2022 for Coronary Angiogram.    Discharge disposition: Home    Results:    Recent labs: (last 7 days)     01/24/22  0833   INR 1.67*     Anticoagulation inpatient management:     not applicable     Anticoagulation discharge instructions:     Warfarin dosing: home regimen continued   Bridging: No   INR goal change: No      Medication changes affecting anticoagulation: No    Additional factors affecting anticoagulation: No    Plan     No adjustment to anticoagulation plan needed. See TE from 01/18/2022. Pt/family received post-procedure instructions, will recheck INR around 01/31/2022.    Patient not contacted    No adjustment to Anticoagulation Calendar was required    Duc Samuel RN

## 2022-01-27 ENCOUNTER — OFFICE VISIT (OUTPATIENT)
Dept: CARDIOLOGY | Facility: CLINIC | Age: 77
End: 2022-01-27
Payer: OTHER MISCELLANEOUS

## 2022-01-27 VITALS
DIASTOLIC BLOOD PRESSURE: 80 MMHG | SYSTOLIC BLOOD PRESSURE: 120 MMHG | WEIGHT: 272 LBS | BODY MASS INDEX: 34 KG/M2 | HEART RATE: 68 BPM | RESPIRATION RATE: 16 BRPM

## 2022-01-27 DIAGNOSIS — E78.5 HYPERLIPIDEMIA, UNSPECIFIED HYPERLIPIDEMIA TYPE: ICD-10-CM

## 2022-01-27 DIAGNOSIS — I25.10 CORONARY ARTERY DISEASE INVOLVING NATIVE CORONARY ARTERY OF NATIVE HEART WITHOUT ANGINA PECTORIS: ICD-10-CM

## 2022-01-27 DIAGNOSIS — I48.19 PERSISTENT ATRIAL FIBRILLATION (H): ICD-10-CM

## 2022-01-27 DIAGNOSIS — I27.20 PULMONARY HYPERTENSION (H): ICD-10-CM

## 2022-01-27 DIAGNOSIS — I50.32 CHRONIC DIASTOLIC HEART FAILURE (H): ICD-10-CM

## 2022-01-27 DIAGNOSIS — Z95.810 ICD (IMPLANTABLE CARDIOVERTER-DEFIBRILLATOR), DUAL, IN SITU: ICD-10-CM

## 2022-01-27 DIAGNOSIS — I10 BENIGN ESSENTIAL HYPERTENSION: ICD-10-CM

## 2022-01-27 DIAGNOSIS — J44.9 CHRONIC OBSTRUCTIVE PULMONARY DISEASE, UNSPECIFIED COPD TYPE (H): ICD-10-CM

## 2022-01-27 DIAGNOSIS — I50.22 CHRONIC SYSTOLIC HEART FAILURE (H): Primary | ICD-10-CM

## 2022-01-27 PROCEDURE — 99214 OFFICE O/P EST MOD 30 MIN: CPT | Performed by: GENERAL ACUTE CARE HOSPITAL

## 2022-01-27 NOTE — PATIENT INSTRUCTIONS
1. We will continue your current medications.  2. Stick to a 2000 mg sodium limit per day.  3. You can cancel your appointment with me on 3/8/2022 and see me back in 4 months.  4. Keep your appointment with Jessica Barrera on 3/15/2022.

## 2022-01-27 NOTE — PROGRESS NOTES
HEART CARE ENCOUNTER NOTE          Assessment/Recommendations   Assessment:    Chronic congestive heart failure with reduced left ventricular ejection fraction with left ventricular ejection fraction improved to 50% due to nonischemic dilated cardiomyopathy (out of proportion to his degree of coronary artery disease). NYHA class III. Cardiac catheterization on 1/24/2022 showed normal left-sided filling pressures and he appears euvolemic today. He residual dyspnea is likely due to his pulmonary disease.  Coronary artery disease status post drug-eluting stenting x3 to the [id number] left anterior descending artery on 9/20/2017. Denies angina.  Moderate-to-severe pulmonary hypertension. Suspect a combination of WHO group II (left-sided heart disease) and group III (lung disease).  Medtronic dual-chamber implantable cardiac defibrillator placement on 6/11/2014 for primary prevention of sudden cardiac death.  History of Medtronic dual-chamber permanent pacemaker placement in 1999 with generator replacement in 2005 and device removal in 2014.  Persistent atrial fibrillation status post multiple electrical cardioversions, most recently on 11/19/2021. He underwent complex catheter ablation x2 in 2011. Appears to be in regular rhythm still today. [id number] score is at least 5.  Benign essential hypertension.  Hyperlipidemia.  Chronic obstructive pulmonary disease on home oxygen.  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    Plan:  Continue furosemide 80 mg twice daily.  Follow-up with Dr. Payton pulmonary clinic.  Sotalol 160 mg twice daily.  Losartan 50 mg daily.  Anticoagulation with warfarin goal INR 2-3.  Follows in device clinic and electrophysiology.  Follow-up with me in 4 months.         History of Present Illness   Mr. Buck Sinclair is a 76 year old male with a significant past history of HFrEF due to nonischemic cardiomyopathy (out of proportion to CAD) with LVEF most recently noted to be 50%, persistent  AF s/p multiple electrical cardioversions and extensive catheter ablations x2 in 2011, CAD s/p CANDELARIA x3 to the [id number] LAD, COPD on home O2, Medtronic dual-chamber permanent pacemaker placement in 1999 replaced with a Medtronic dual-chamber implantable cardiac defibrillator on 6/11/2014, HTN, and HLD presenting for follow-up.    After being cardioverted 11/19/2021 and being on a higher dose of furosemide, he had some mild improvement in his symptoms but still noted significant dyspnea on exertion. He was referred for RHLC on 1/24/2022, which showed patent stents, normal left-sided filling pressures, and elevated pulmonary artery pressures.     He feels about the same. He think getting out of atrial fibrillation made the biggest difference in his symptoms. Weights have been stable recently. Trying to limit dietary sodium as much as possible. He denies any chest pain/pressure/tightness, light headedness/dizziness, pre-syncope, syncope, lower extremity swelling, palpitations, paroxysmal nocturnal dyspnea (PND), or orthopnea.     Cardiac Problems and Cardiac Diagnostics     Most Recent Cardiac testing:  ECG dated 1/24/2022 (personaly reviewed and interpreted): atrial-paced, right bundle branch block QRS duration 126 ms, inferior infarct    Device check 1/6/2022 (report reviewed):  Presenting rhythm: AP-VS 60 bpm.  Battery/lead status: stable. Device nearing HANNAH. 3mo estimated longevity.   Arrhythmias: since 11/19/21; 123 mode switch episodes, longest lasting 1hr, EGMs confirm AF, overall ventricular rates controlled, AF burden 0.5%. No VT/VF detected.  Anticoagulant: warfarin.  Comments: normal ICD function.   Device/lead alerts: none    ECHO 12/8/2021 (report reviewed):   1. Left ventricular systolic function is normal. The left ventricle is normal  in size. There is normal left ventricular wall thickness.Left ventricular  diastolic function is normal. No regional wall motion abnormalities noted.  2. The right  ventricle is mildly dilated. Mildly decreased right ventricular  systolic functio.  3. There is mild to moderate (1-2+) tricuspid regurgitation.  4. The right ventricular systolic pressure is approximated at 45.7mmHg plus  the right atrial pressure.Right ventricular systolic pressure is elevated,  consistent with moderate pulmonary hypertension. Normal RA pressure of 3 mmHg.  6. The ascending aorta is mildly dilated at 42 mm.    Stress test 11/7/2019 (report reviewed):     The nuclear stress test is abnormal.     There is a small area of nontransmural infarction in the apical segment(s) of the left ventricle.     The left ventricular ejection fraction at stress is 63%.     A prior study was conducted on 7/19/2018.  This study has changes noted when compared with the prior study. The apical defect no longer demonstrates any ischemia.    Cardiac cath 1/24/2022 (report reviewed):   Mild coronary artery disease with patent LAD stents.  LVEDP and PCWP are normal. The PA pressure was elevated at 66/24 mmHg with a mean pressure of 37 mmHg. See numbers below.  Shikha and TD cardiac outputs were both 5.7 l/min.  Sats on 2L NC oxygen: Ao 93%, PA 60%, RA 59%         Medications  Allergies   Current Outpatient Medications   Medication Sig Dispense Refill     acetaminophen (TYLENOL) 325 MG tablet Take 650 mg by mouth 2 times daily       albuterol (PROAIR HFA/PROVENTIL HFA/VENTOLIN HFA) 108 (90 Base) MCG/ACT inhaler Inhale 2 puffs into the lungs every 4 hours as needed for shortness of breath / dyspnea or wheezing       amoxicillin (AMOXIL) 500 MG tablet Take 500-2,000 mg by mouth once as needed (TAKE 1 HOUR PRIOR TO DENTAL APPOINTMENTS.)        Ascorbic Acid (VITAMIN C) 500 MG CAPS Take 1,000 mg by mouth daily       atorvastatin (LIPITOR) 40 MG tablet Take 40 mg by mouth daily       budesonide-formoterol (SYMBICORT) 160-4.5 MCG/ACT Inhaler Inhale 2 puffs into the lungs 2 times daily       co-enzyme Q-10 100 MG CAPS capsule Take  "100 mg by mouth daily        fish oil-omega-3 fatty acids 1000 MG capsule Take 2 g by mouth 2 times daily       FLUoxetine (PROZAC) 20 MG capsule TAKE 1 CAPSULE BY MOUTH EVERY DAY       furosemide (LASIX) 80 MG tablet Take 1 tablet (80 mg) by mouth 2 times daily 90 tablet 6     losartan (COZAAR) 50 MG tablet Take 1 tablet (50 mg) by mouth daily 90 tablet 3     magnesium chloride 535 (64 Mg) MG TBEC CR tablet Take 64 mg by mouth 2 times daily       multivitamin, therapeutic (THERA-VIT) TABS tablet Take 1 tablet by mouth daily       nitroGLYcerin (NITROSTAT) 0.4 MG sublingual tablet One tablet under the tongue every 5 minutes if needed for chest pain. May repeat every 5 minutes for a maximum of 3 doses in 15 minutes\" 25 tablet 3     OXYGEN-HELIUM IN 4-5 L        polyethylene glycol (MIRALAX) 17 GM/Dose powder Take 17 g by mouth daily        sotalol (BETAPACE) 160 MG tablet Take 160 mg by mouth 2 times daily       tiotropium (SPIRIVA) 18 MCG inhaled capsule Inhale 18 mcg into the lungs daily       vitamin D2 (ERGOCALCIFEROL) 93828 units (1250 mcg) capsule TAKE 1 CAPSULE BY MOUTH 2 TIMES A WEEK       warfarin ANTICOAGULANT (COUMADIN) 2.5 MG tablet Take 1 to 2 tablets (2.5 - 5 mg) daily by mouth. Adjust dose based on INR results as directed. 130 tablet 1      Allergies   Allergen Reactions     Adhesive Tape Other (See Comments)     ADHESIVE TAPE; SKIN IRRITATION; Skin pulled off with foam tape       Amiodarone      ADVERSE REACTION.  Sunlight sensitivity.     Lisinopril         Physical Examination Review of Systems   /80 (BP Location: Right arm, Patient Position: Sitting, Cuff Size: Adult Large)   Pulse 68   Resp 16   Wt 123.4 kg (272 lb)   BMI 34.00 kg/m    Body mass index is 34 kg/m .  Wt Readings from Last 3 Encounters:   01/27/22 123.4 kg (272 lb)   01/24/22 115.9 kg (255 lb 9.6 oz)   01/14/22 122.9 kg (271 lb)       General Appearance:   Pleasant  male, appears  stated age. no acute distress, normal body " habitus   ENT/Mouth: Facemask      EYES:  no scleral icterus, normal conjunctivae   Neck: no carotid bruits. No anterior cervical lymphadenopaty   Respiratory:   lungs with slightly diminished breath sound, no rales or wheezing,equal chest wall expansion    Cardiovascular:   Regular rhythm, normal rate. Normal first and second heart sounds with no murmurs, rubs, or gallops; the carotid, radial and posterior tibial pulses are intact, Jugular venous pressure not elevated, no lower extremity edema bilaterally    Abdomen/GI:  no organomegaly, masses, bruits, or tenderness; bowel sounds are present   Extremities: no cyanosis or clubbing   Skin: no xanthelasma, warm.    Heme/lymph/ Immunology No apparent bleeding noted.   Neurologic: Alert and oriented. normal gait, no tremors     Psychiatric: Pleasant, calm, appropriate affect.    A complete 10 system review of systems was performed and is negative except as mentioned in the HPI/subjective.         Past History   Past Medical History:   Past Medical History:   Diagnosis Date     Anemia      Asthma without status asthmaticus 5/5/2021     BPH (benign prostatic hyperplasia)      Cardiomyopathy (H)      COPD, group B, by GOLD 2017 classification (H)      Coronary artery disease due to calcified coronary lesion      Dyslipidemia, goal LDL below 70      Essential hypertension      History of transfusion      Persistent atrial fibrillation (H)      Pneumonia of left lower lobe due to infectious organism 10/4/2017     Skin cancer of trunk      Status post catheter ablation of atrial fibrillation 6/7/2017    PVI 4-2011 (Cryo/PVI + roof line + CTI line) Re-do PVI 7-2011 (RFA/PVI + CFE + VIDYA + confirmed CTI line)     Ventricular tachycardia (H)        Past Surgical History:   Past Surgical History:   Procedure Laterality Date     CARDIAC DEFIBRILLATOR PLACEMENT       CARDIOVERSION  07/11/2018    x20, last 2/12/15, 10/2015, 11/18/16, 6/16/17 by Lauren Foster CNP     CARDIOVERSION   07/11/2018     CARDIOVERSION  11/19/2021     COLONOSCOPY N/A 4/28/2017    Procedure: COLONOSCOPY with 2 ascending polyps and 1 transverse polyp;  Surgeon: Jose Whittington MD;  Location: [facility] GI;  Service:      CV CORONARY ANGIOGRAM N/A 9/20/2017    Procedure: Coronary Angiogram;  Surgeon: Sergio Cervantes MD;  Location: [facility] Cath Lab;  Service:      CV CORONARY ANGIOGRAM N/A 1/24/2022    Procedure: Coronary Angiogram;  Surgeon: Christi Saunders MD;  Location: [facility] CATH LAB CV     CV LEFT HEART CATH N/A 1/24/2022    Procedure: Left Heart Cath;  Surgeon: Christi Saunders MD;  Location: [facility] CATH LAB CV     CV RIGHT AND LEFT HEART CATH N/A 1/24/2022    Procedure: Right and Left Heart Catherization;  Surgeon: Christi Saunders MD;  Location: [facility] CATH [facility] CV     EP ICD INSERT       FRACTURE SURGERY Left     wrist     INGUINAL HERNIA REPAIR Left 1967    while in the [employer] in [place] after 13 month in [place]     INSERT / REPLACE / REMOVE PACEMAKER       IR MISCELLANEOUS PROCEDURE  4/30/2014     OTHER SURGICAL HISTORY      left hand surgery---tendon repair     WA ABLATE HEART DYSRHYTHM FOCUS  04/2011    Catheter Ablation Atrial Fibrillation PVI Apr 2011 (Cryo+RF-PVI + roof line + CTI line)     WA ABLATE HEART DYSRHYTHM FOCUS  07/2011    Re-do PVI Jul 2011 (RFA-PVI + CFE + VIDYA + confirmation of CTI line)     TOTAL SHOULDER REPLACEMENT Right 03/03/2016    Dr. Abernathy of [facility]     WRIST SURGERY Left      ZZC MYERS W/O FACETEC FORAMOT/DSKC 1/2 VRT SEG, CERVICAL      Laminectomy Lumbar;  Recorded: 03/09/2012;       Family History:   Family History   Problem Relation Age of Onset     Cancer Mother         leukemia     Cancer Father         bladder     Cancer Sister         breast with lung met.     Aneurysm Sister      CABG Brother      CABG Brother      Valvular heart disease Brother         valve replacement        Social History:   Social History     Socioeconomic History     Marital  status:      Spouse name: Not on file     Number of children: Not on file     Years of education: Not on file     Highest education level: Not on file   Occupational History     Not on file   Tobacco Use     Smoking status: Former Smoker     Packs/day: 1.00     Years: 4.00     Pack years: 4.00     Types: Cigarettes     Quit date: 1968     Years since quittin.1     Smokeless tobacco: Never Used   Substance and Sexual Activity     Alcohol use: Yes     Alcohol/week: 2.0 standard drinks     Comment: Alcoholic Drinks/day: 1 beer per week     Drug use: No     Sexual activity: Yes     Partners: Female     Birth control/protection: Post-menopausal   Other Topics Concern     Parent/sibling w/ CABG, MI or angioplasty before 65F 55M? Not Asked   Social History Narrative    Preloaded 2013     Social Determinants of Health     Financial Resource Strain: Not on file   Food Insecurity: Not on file   Transportation Needs: Not on file   Physical Activity: Not on file   Stress: Not on file   Social Connections: Not on file   Intimate Partner Violence: Not on file   Housing Stability: Not on file              Lab Results    Chemistry/lipid CBC Cardiac Enzymes/BNP/TSH/INR   Lab Results   Component Value Date    CHOL 117 2021    HDL 54 2021    LDL 54 2021    TRIG 43 2021    CR 1.75 (H) 2022    BUN 41 (H) 2022    POTASSIUM 4.0 2022     2022    CO2 24 2022      Lab Results   Component Value Date    WBC 7.3 2022    HGB 10.6 (L) 2022    HCT 34.3 (L) 2022     (H) 2022     2022    Lab Results   Component Value Date     (H) 2020    TSH 1.36 2018    INR 1.67 (H) 2022          Domo Garrett MD [facility]  Non-Invasive Cardiologist  [facility]  Pager [phone]

## 2022-01-27 NOTE — LETTER
1/27/2022    Vivek Guerrero MD  [facility] [[email or web address]] [address]  [facility] [address]    RE: Buck Sinclair       Dear Colleague,     I had the pleasure of seeing Buck Sinclair in the [facility].  HEART CARE ENCOUNTER NOTE          Assessment/Recommendations   Assessment:    1. Chronic congestive heart failure with reduced left ventricular ejection fraction with left ventricular ejection fraction improved to 50% due to nonischemic dilated cardiomyopathy (out of proportion to his degree of coronary artery disease). NYHA class III. Cardiac catheterization on 1/24/2022 showed normal left-sided filling pressures and he appears euvolemic today. He residual dyspnea is likely due to his pulmonary disease.  2. Coronary artery disease status post drug-eluting stenting x3 to the eyplzlor-bkozixf-ryowhx left anterior descending artery on 9/20/2017. Denies angina.  3. Moderate-to-severe pulmonary hypertension. Suspect a combination of WHO group II (left-sided heart disease) and group III (lung disease).  4. Medtronic dual-chamber implantable cardiac defibrillator placement on 6/11/2014 for primary prevention of sudden cardiac death.  5. History of Medtronic dual-chamber permanent pacemaker placement in 1999 with generator replacement in 2005 and device removal in 2014.  6. Persistent atrial fibrillation status post multiple electrical cardioversions, most recently on 11/19/2021. He underwent complex catheter ablation x2 in 2011. Appears to be in regular rhythm still today. [id number] score is at least 5.  7. Benign essential hypertension.  8. Hyperlipidemia.  9. Chronic obstructive pulmonary disease on home oxygen.  10. 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    Plan:  1. Continue furosemide 80 mg twice daily.  2. Follow-up with Dr. Payton pulmonary clinic.  3. Sotalol 160 mg twice daily.  4. Losartan 50 mg daily.  5. Anticoagulation with warfarin goal INR 2-3.  6. Follows in device clinic and  electrophysiology.  7. Follow-up with me in 4 months.         History of Present Illness   Mr. Buck Sinclair is a 76 year old male with a significant past history of HFrEF due to nonischemic cardiomyopathy (out of proportion to CAD) with LVEF most recently noted to be 50%, persistent AF s/p multiple electrical cardioversions and extensive catheter ablations x2 in 2011, CAD s/p CANDELARIA x3 to the ckeozlmy-vm-ucaebr LAD, COPD on home O2, Medtronic dual-chamber permanent pacemaker placement in 1999 replaced with a Medtronic dual-chamber implantable cardiac defibrillator on 6/11/2014, HTN, and HLD presenting for follow-up.    After being cardioverted 11/19/2021 and being on a higher dose of furosemide, he had some mild improvement in his symptoms but still noted significant dyspnea on exertion. He was referred for RHLC on 1/24/2022, which showed patent stents, normal left-sided filling pressures, and elevated pulmonary artery pressures.     He feels about the same. He think getting out of atrial fibrillation made the biggest difference in his symptoms. Weights have been stable recently. Trying to limit dietary sodium as much as possible. He denies any chest pain/pressure/tightness, light headedness/dizziness, pre-syncope, syncope, lower extremity swelling, palpitations, paroxysmal nocturnal dyspnea (PND), or orthopnea.     Cardiac Problems and Cardiac Diagnostics     Most Recent Cardiac testing:  ECG dated 1/24/2022 (personaly reviewed and interpreted): atrial-paced, right bundle branch block QRS duration 126 ms, inferior infarct    Device check 1/6/2022 (report reviewed):  Presenting rhythm: AP-VS 60 bpm.  Battery/lead status: stable. Device nearing HANNAH. 3mo estimated longevity.   Arrhythmias: since 11/19/21; 123 mode switch episodes, longest lasting 1hr, EGMs confirm AF, overall ventricular rates controlled, AF burden 0.5%. No VT/VF detected.  Anticoagulant: warfarin.  Comments: normal ICD function.   Device/lead  alerts: none    ECHO 12/8/2021 (report reviewed):   1. Left ventricular systolic function is normal. The left ventricle is normal  in size. There is normal left ventricular wall thickness.Left ventricular  diastolic function is normal. No regional wall motion abnormalities noted.  2. The right ventricle is mildly dilated. Mildly decreased right ventricular  systolic functio.  3. There is mild to moderate (1-2+) tricuspid regurgitation.  4. The right ventricular systolic pressure is approximated at 45.7mmHg plus  the right atrial pressure.Right ventricular systolic pressure is elevated,  consistent with moderate pulmonary hypertension. Normal RA pressure of 3 mmHg.  6. The ascending aorta is mildly dilated at 42 mm.    Stress test 11/7/2019 (report reviewed):     The nuclear stress test is abnormal.     There is a small area of nontransmural infarction in the apical segment(s) of the left ventricle.     The left ventricular ejection fraction at stress is 63%.     A prior study was conducted on 7/19/2018.  This study has changes noted when compared with the prior study. The apical defect no longer demonstrates any ischemia.    Cardiac cath 1/24/2022 (report reviewed):     Mild coronary artery disease with patent LAD stents.    LVEDP and PCWP are normal. The PA pressure was elevated at 66/24 mmHg with a mean pressure of 37 mmHg. See numbers below.    Shikha and TD cardiac outputs were both 5.7 l/min.    Sats on 2L NC oxygen: Ao 93%, PA 60%, RA 59%         Medications  Allergies   Current Outpatient Medications   Medication Sig Dispense Refill     acetaminophen (TYLENOL) 325 MG tablet Take 650 mg by mouth 2 times daily       albuterol (PROAIR HFA/PROVENTIL HFA/VENTOLIN HFA) 108 (90 Base) MCG/ACT inhaler Inhale 2 puffs into the lungs every 4 hours as needed for shortness of breath / dyspnea or wheezing       amoxicillin (AMOXIL) 500 MG tablet Take 500-2,000 mg by mouth once as needed (TAKE 1 HOUR PRIOR TO DENTAL  "APPOINTMENTS.)        Ascorbic Acid (VITAMIN C) 500 MG CAPS Take 1,000 mg by mouth daily       atorvastatin (LIPITOR) 40 MG tablet Take 40 mg by mouth daily       budesonide-formoterol (SYMBICORT) 160-4.5 MCG/ACT Inhaler Inhale 2 puffs into the lungs 2 times daily       co-enzyme Q-10 100 MG CAPS capsule Take 100 mg by mouth daily        fish oil-omega-3 fatty acids 1000 MG capsule Take 2 g by mouth 2 times daily       FLUoxetine (PROZAC) 20 MG capsule TAKE 1 CAPSULE BY MOUTH EVERY DAY       furosemide (LASIX) 80 MG tablet Take 1 tablet (80 mg) by mouth 2 times daily 90 tablet 6     losartan (COZAAR) 50 MG tablet Take 1 tablet (50 mg) by mouth daily 90 tablet 3     magnesium chloride 535 (64 Mg) MG TBEC CR tablet Take 64 mg by mouth 2 times daily       multivitamin, therapeutic (THERA-VIT) TABS tablet Take 1 tablet by mouth daily       nitroGLYcerin (NITROSTAT) 0.4 MG sublingual tablet One tablet under the tongue every 5 minutes if needed for chest pain. May repeat every 5 minutes for a maximum of 3 doses in 15 minutes\" 25 tablet 3     OXYGEN-HELIUM IN 4-5 L        polyethylene glycol (MIRALAX) 17 GM/Dose powder Take 17 g by mouth daily        sotalol (BETAPACE) 160 MG tablet Take 160 mg by mouth 2 times daily       tiotropium (SPIRIVA) 18 MCG inhaled capsule Inhale 18 mcg into the lungs daily       vitamin D2 (ERGOCALCIFEROL) 88648 units (1250 mcg) capsule TAKE 1 CAPSULE BY MOUTH 2 TIMES A WEEK       warfarin ANTICOAGULANT (COUMADIN) 2.5 MG tablet Take 1 to 2 tablets (2.5 - 5 mg) daily by mouth. Adjust dose based on INR results as directed. 130 tablet 1      Allergies   Allergen Reactions     Adhesive Tape Other (See Comments)     ADHESIVE TAPE; SKIN IRRITATION; Skin pulled off with foam tape       Amiodarone      ADVERSE REACTION.  Sunlight sensitivity.     Lisinopril         Physical Examination Review of Systems   /80 (BP Location: Right arm, Patient Position: Sitting, Cuff Size: Adult Large)   Pulse 68  "  Resp 16   Wt 123.4 kg (272 lb)   BMI 34.00 kg/m    Body mass index is 34 kg/m .  Wt Readings from Last 3 Encounters:   01/27/22 123.4 kg (272 lb)   01/24/22 115.9 kg (255 lb 9.6 oz)   01/14/22 122.9 kg (271 lb)       General Appearance:   Pleasant  male, appears  stated age. no acute distress, normal body habitus   ENT/Mouth: Facemask      EYES:  no scleral icterus, normal conjunctivae   Neck: no carotid bruits. No anterior cervical lymphadenopaty   Respiratory:   lungs with slightly diminished breath sound, no rales or wheezing,equal chest wall expansion    Cardiovascular:   Regular rhythm, normal rate. Normal first and second heart sounds with no murmurs, rubs, or gallops; the carotid, radial and posterior tibial pulses are intact, Jugular venous pressure not elevated, no lower extremity edema bilaterally    Abdomen/GI:  no organomegaly, masses, bruits, or tenderness; bowel sounds are present   Extremities: no cyanosis or clubbing   Skin: no xanthelasma, warm.    Heme/lymph/ Immunology No apparent bleeding noted.   Neurologic: Alert and oriented. normal gait, no tremors     Psychiatric: Pleasant, calm, appropriate affect.    A complete 10 system review of systems was performed and is negative except as mentioned in the HPI/subjective.         Past History   Past Medical History:   Past Medical History:   Diagnosis Date     Anemia      Asthma without status asthmaticus 5/5/2021     BPH (benign prostatic hyperplasia)      Cardiomyopathy (H)      COPD, group B, by GOLD 2017 classification (H)      Coronary artery disease due to calcified coronary lesion      Dyslipidemia, goal LDL below 70      Essential hypertension      History of transfusion      Persistent atrial fibrillation (H)      Pneumonia of left lower lobe due to infectious organism 10/4/2017     Skin cancer of trunk      Status post catheter ablation of atrial fibrillation 6/7/2017    PVI 4-2011 (Cryo/PVI + roof line + CTI line) Re-do PVI 7-2011  (RFA/PVI + CFE + VIDYA + confirmed CTI line)     Ventricular tachycardia (H)        Past Surgical History:   Past Surgical History:   Procedure Laterality Date     CARDIAC DEFIBRILLATOR PLACEMENT       CARDIOVERSION  07/11/2018    x20, last 2/12/15, 10/2015, 11/18/16, 6/16/17 by Lauren Foster CNP     CARDIOVERSION  07/11/2018     CARDIOVERSION  11/19/2021     COLONOSCOPY N/A 4/28/2017    Procedure: COLONOSCOPY with 2 ascending polyps and 1 transverse polyp;  Surgeon: Jose Whittington MD;  Location: [facility] GI;  Service:      CV CORONARY ANGIOGRAM N/A 9/20/2017    Procedure: Coronary Angiogram;  Surgeon: Sergio Cervantes MD;  Location: [facility];  Service:      CV CORONARY ANGIOGRAM N/A 1/24/2022    Procedure: Coronary Angiogram;  Surgeon: Christi Saunders MD;  Location: [facility] CATH LAB CV     CV LEFT HEART CATH N/A 1/24/2022    Procedure: Left Heart Cath;  Surgeon: Christi Saunders MD;  Location: [facility] LAB CV     CV RIGHT AND LEFT HEART CATH N/A 1/24/2022    Procedure: Right and Left Heart Catherization;  Surgeon: Christi Saunders MD;  Location: [facility] CATH LAB CV     EP ICD INSERT       FRACTURE SURGERY Left     wrist     INGUINAL HERNIA REPAIR Left 1967    while in the [employer] in [place] after 13 month in [place]     INSERT / REPLACE / REMOVE PACEMAKER       IR MISCELLANEOUS PROCEDURE  4/30/2014     OTHER SURGICAL HISTORY      left hand surgery---tendon repair     IL ABLATE HEART DYSRHYTHM FOCUS  04/2011    Catheter Ablation Atrial Fibrillation PVI Apr 2011 (Cryo+RF-PVI + roof line + CTI line)     IL ABLATE HEART DYSRHYTHM FOCUS  07/2011    Re-do PVI Jul 2011 (RFA-PVI + CFE + VIDYA + confirmation of CTI line)     TOTAL SHOULDER REPLACEMENT Right 03/03/2016    Dr. Abernathy of [facility]     WRIST SURGERY Left      ZZC MYERS W/O FACETEC FORAMOT/DSKC 1/2 VRT SEG, CERVICAL      Laminectomy Lumbar;  Recorded: 03/09/2012;       Family History:   Family History   Problem Relation Age of  Onset     Cancer Mother         leukemia     Cancer Father         bladder     Cancer Sister         breast with lung met.     Aneurysm Sister      CABG Brother      CABG Brother      Valvular heart disease Brother         valve replacement        Social History:   Social History     Socioeconomic History     Marital status:      Spouse name: Not on file     Number of children: Not on file     Years of education: Not on file     Highest education level: Not on file   Occupational History     Not on file   Tobacco Use     Smoking status: Former Smoker     Packs/day: 1.00     Years: 4.00     Pack years: 4.00     Types: Cigarettes     Quit date: 1968     Years since quittin.1     Smokeless tobacco: Never Used   Substance and Sexual Activity     Alcohol use: Yes     Alcohol/week: 2.0 standard drinks     Comment: Alcoholic Drinks/day: 1 beer per week     Drug use: No     Sexual activity: Yes     Partners: Female     Birth control/protection: Post-menopausal   Other Topics Concern     Parent/sibling w/ CABG, MI or angioplasty before 65F 55M? Not Asked   Social History Narrative    Preloaded 2013     Social Determinants of Health     Financial Resource Strain: Not on file   Food Insecurity: Not on file   Transportation Needs: Not on file   Physical Activity: Not on file   Stress: Not on file   Social Connections: Not on file   Intimate Partner Violence: Not on file   Housing Stability: Not on file              Lab Results    Chemistry/lipid CBC Cardiac Enzymes/BNP/TSH/INR   Lab Results   Component Value Date    CHOL 117 2021    HDL 54 2021    LDL 54 2021    TRIG 43 2021    CR 1.75 (H) 2022    BUN 41 (H) 2022    POTASSIUM 4.0 2022     2022    CO2 24 2022      Lab Results   Component Value Date    WBC 7.3 2022    HGB 10.6 (L) 2022    HCT 34.3 (L) 2022     (H) 2022     2022    Lab Results   Component  Value Date     (H) 12/01/2020    TSH 1.36 07/06/2018    INR 1.67 (H) 01/24/2022          Domo Garrett MD [facility]  Non-Invasive Cardiologist  [facility]  Pager [phone]    Thank you for allowing me to participate in the care of your patient.    Sincerely,     Domo Garrett MD     [facility]  cc:   Domo Garrett MD  [address] [place], [place] [address]  [place] [address]

## 2022-02-01 ENCOUNTER — TRANSFERRED RECORDS (OUTPATIENT)
Dept: HEALTH INFORMATION MANAGEMENT | Facility: CLINIC | Age: 77
End: 2022-02-01
Payer: COMMERCIAL

## 2022-02-01 ENCOUNTER — ANTICOAGULATION THERAPY VISIT (OUTPATIENT)
Dept: ANTICOAGULATION | Facility: CLINIC | Age: 77
End: 2022-02-01
Payer: COMMERCIAL

## 2022-02-01 DIAGNOSIS — I48.19 PERSISTENT ATRIAL FIBRILLATION (H): ICD-10-CM

## 2022-02-01 DIAGNOSIS — I48.91 ATRIAL FIBRILLATION (H): Primary | ICD-10-CM

## 2022-02-01 LAB — INR HOME MONITORING: 1.7 (ref 2–3)

## 2022-02-01 NOTE — PROGRESS NOTES
Anticoagulation Management    Today's INR result of 1.70 is subtherapeutic (goal INR of 2.0-3.0).  Result received from: Home Monitor    Spoke with Buck who states that his wife manages his medication but she is out to lunch. Requests [facility] RN call back to speak with her later this afternoon.     Anticoagulation clinic to follow up    Michael Holliday RN

## 2022-02-01 NOTE — PROGRESS NOTES
ANTICOAGULATION MANAGEMENT     Buck Sinclair 76 year old male is on warfarin with subtherapeutic INR result. (Goal INR 2.0-3.0)    Recent labs: (last 7 days)     02/01/22  0000   INR 1.70*       ASSESSMENT     Source(s): Chart Review and Patient/Caregiver Call     Warfarin doses taken: Warfarin taken as instructed  Diet: No new diet changes identified  New illness, injury, or hospitalization: No  Medication/supplement changes: None noted  Signs or symptoms of bleeding or clotting: No  Previous INR: Subtherapeutic  Additional findings: Recent hold for procedure on 1/24/22 which may be impacting INR.      PLAN     Recommended plan for temporary change(s) affecting INR     Dosing Instructions: Booster dose then continue your current warfarin dose with next INR in 1 week       Summary  As of 2/1/2022    Full warfarin instructions:  2/1: 5 mg; Otherwise 5 mg every Mon, Thu; 2.5 mg all other days   Next INR check:  2/8/2022             Telephone call with Neela who verbalizes understanding and agrees to plan    Patient to recheck with home meter    Education provided: Goal range and significance of current result, Importance of therapeutic range and Importance of taking warfarin as instructed    Plan made per [facility] anticoagulation protocol    Michael Holliday RN  Anticoagulation Clinic  2/1/2022    _______________________________________________________________________     Anticoagulation Episode Summary     Current INR goal:  2.0-3.0   TTR:  88.9 % (1 y)   Target end date:  Indefinite   Send INR reminders to:  ANTICO KASOTA    Indications    Persistent Atrial Fibrillation [I48.91]  Persistent atrial fibrillation (H) [I48.19]           Comments:  Home monitor ( [employer] )managed by exception         Anticoagulation Care Providers     Provider Role Specialty Phone number    Erica Hansen APRN CNP Referring Cardiovascular Disease [phone]    Everett Renee MD  Cardiovascular Disease [phone]

## 2022-02-07 ENCOUNTER — TELEPHONE (OUTPATIENT)
Dept: PULMONOLOGY | Facility: OTHER | Age: 77
End: 2022-02-07
Payer: COMMERCIAL

## 2022-02-07 NOTE — TELEPHONE ENCOUNTER
Called Buck who was not available, talked with wife Neela, pt needs to have lab work done prior to his appointment on Thursday, she will relay message to .    Doreen Jaeger LPN

## 2022-02-08 ENCOUNTER — ANTICOAGULATION THERAPY VISIT (OUTPATIENT)
Dept: ANTICOAGULATION | Facility: CLINIC | Age: 77
End: 2022-02-08
Payer: COMMERCIAL

## 2022-02-08 ENCOUNTER — TRANSFERRED RECORDS (OUTPATIENT)
Dept: HEALTH INFORMATION MANAGEMENT | Facility: CLINIC | Age: 77
End: 2022-02-08
Payer: COMMERCIAL

## 2022-02-08 DIAGNOSIS — I48.19 PERSISTENT ATRIAL FIBRILLATION (H): ICD-10-CM

## 2022-02-08 DIAGNOSIS — I48.91 ATRIAL FIBRILLATION (H): Primary | ICD-10-CM

## 2022-02-08 LAB — INR HOME MONITORING: 2.8 (ref 2–3)

## 2022-02-08 NOTE — PROGRESS NOTES
ANTICOAGULATION MANAGEMENT     Buck Sinclair 76 year old male is on warfarin with therapeutic INR result. (Goal INR 2.0-3.0)    Recent labs: (last 7 days)     02/08/22  0000   INR 2.80       ASSESSMENT     Source(s): Chart Review and Patient/Caregiver Call     Warfarin doses taken: Warfarin taken as instructed  Diet: No new diet changes identified  New illness, injury, or hospitalization: No  Medication/supplement changes: None noted  Signs or symptoms of bleeding or clotting: No  Previous INR: Subtherapeutic  Additional findings: None     PLAN     Recommended plan for no diet, medication or health factor changes affecting INR     Dosing Instructions: Continue your current warfarin dose with next INR in 1 week       Summary  As of 2/8/2022    Full warfarin instructions:  5 mg every Mon, Thu; 2.5 mg all other days   Next INR check:               Telephone call with  Neela who verbalizes understanding and agrees to plan    Patient to recheck with home meter    Education provided: Please call back if any changes to your diet, medications or how you've been taking warfarin    Plan made per [facility] anticoagulation protocol    Gisele Saul RN  Anticoagulation Clinic  2/8/2022    _______________________________________________________________________     Anticoagulation Episode Summary     Current INR goal:  2.0-3.0   TTR:  90.1 % (1 y)   Target end date:  Indefinite   Send INR reminders to:  LARISA RICHARDS    Indications    Persistent Atrial Fibrillation [I48.91]  Persistent atrial fibrillation (H) [I48.19]           Comments:  Home monitor ( [employer] )managed by exception         Anticoagulation Care Providers     Provider Role Specialty Phone number    Erica Hansen APRN CNP Referring Cardiovascular Disease [phone]    Everett Renee MD  Cardiovascular Disease [phone]

## 2022-02-09 ENCOUNTER — LAB (OUTPATIENT)
Dept: LAB | Facility: HOSPITAL | Age: 77
End: 2022-02-09
Payer: OTHER MISCELLANEOUS

## 2022-02-09 DIAGNOSIS — I50.9 CONGESTIVE HEART FAILURE, UNSPECIFIED HF CHRONICITY, UNSPECIFIED HEART FAILURE TYPE (H): ICD-10-CM

## 2022-02-09 LAB
ANION GAP SERPL CALCULATED.3IONS-SCNC: 13 MMOL/L (ref 5–18)
BUN SERPL-MCNC: 38 MG/DL (ref 8–28)
CALCIUM SERPL-MCNC: 9.5 MG/DL (ref 8.5–10.5)
CHLORIDE BLD-SCNC: 107 MMOL/L (ref 98–107)
CO2 SERPL-SCNC: 24 MMOL/L (ref 22–31)
CREAT SERPL-MCNC: 1.61 MG/DL (ref 0.7–1.3)
GFR SERPL CREATININE-BSD FRML MDRD: 44 ML/MIN/1.73M2
GLUCOSE BLD-MCNC: 112 MG/DL (ref 70–125)
NT-PROBNP SERPL-MCNC: 1709 PG/ML (ref 0–450)
POTASSIUM BLD-SCNC: 4.5 MMOL/L (ref 3.5–5)
SODIUM SERPL-SCNC: 144 MMOL/L (ref 136–145)

## 2022-02-09 PROCEDURE — 36415 COLL VENOUS BLD VENIPUNCTURE: CPT

## 2022-02-09 PROCEDURE — 83880 ASSAY OF NATRIURETIC PEPTIDE: CPT

## 2022-02-09 PROCEDURE — 82310 ASSAY OF CALCIUM: CPT

## 2022-02-10 ENCOUNTER — OFFICE VISIT (OUTPATIENT)
Dept: PULMONOLOGY | Facility: OTHER | Age: 77
End: 2022-02-10
Payer: OTHER MISCELLANEOUS

## 2022-02-10 VITALS
RESPIRATION RATE: 16 BRPM | WEIGHT: 271.6 LBS | HEIGHT: 75 IN | OXYGEN SATURATION: 95 % | BODY MASS INDEX: 33.77 KG/M2 | HEART RATE: 72 BPM

## 2022-02-10 DIAGNOSIS — I27.21 PAH (PULMONARY ARTERY HYPERTENSION) (H): Primary | ICD-10-CM

## 2022-02-10 PROCEDURE — 99214 OFFICE O/P EST MOD 30 MIN: CPT | Performed by: INTERNAL MEDICINE

## 2022-02-10 ASSESSMENT — MIFFLIN-ST. JEOR: SCORE: 2047.6

## 2022-02-10 NOTE — LETTER
2/10/2022         RE: Buck Sinclair  [address] [place] [address] [address] [place] [address]        Dear Colleague,    Thank you for referring your patient, Buck Sinclair, to the [facility]. Please see a copy of my visit note below.    Pulmonary Clinic Follow-up Visit    Assessment/Plan:  76 year old male with a history of tobacco dependence in remission, CAD s/p PCI/stents, afib s/p PVL with ICD/PPM on anticoagulation, history of cavitary lesion and extensive pneumonia in LLL in Oct 2017, subsequent recurrent LLL pneumonia, since resolved, presenting for follow up. Repeat PFT's in 2020 actually showed improved FEV1/FVC ratio, stable FEV1 but substantial worsening of the DLco (almost 40% lower than in 2018).  Recently he presented with CHF and has been doing better with higher Lasix dose.  Recent RHC data was reviewed and showed mPAP of 37 mm Hg, normal PCWP, CO 5.7 L/min, TPG of 21 and PVR of 4.7 BLAIR.   This is consistent with group 1 PH. He could still have pulmonary arteriopathy due to some left sided heart disease as well as chronic lung disease.  I would like him to meet with our pulmonary hypertension expert to discuss pulmonary vasodilators. These could make his lung disease worse so the risk/benefits will need to be weighed carefully but I think it's reasonable to get input from cardiology on this.   I did recommend pulmonary rehab but he continues to decline.      Plan:  #COPD, GOLD class B (CAT >10, few exacerbations/low risk for hospitalization). Minimal smoking history so this is probably due to his work as a . PFTs 2017 showed mild obstruction and normal DLco. Mild exertional hypoxemia noted on 6MWT in 2019. Now on supplemental oxygen as of 2020.  - continue budesonide-formoterol 160-4.5 mcg two inhalations BID with spacer; rinse/gargle/spit water after use. No dose changes today.  - continue tiotropium HandiHaler one inhalation daily  - Cont albuterol as needed  -  encouraged exercise, weight loss as able  - continue supplemental O2 for goal O2 sat 88-92%   - did not have time to address pulmonary rehab today.  - no indication for LDCT as his smoking history is too minimal.   - UTD w/ pneumonia, flu and covid-19 vaccines. I am not sure if he got the booster - defer to his PMD to discuss.     #exertional hypoxemia, worsening DLco, significant LIMA: suspicion for PVD/pulm HTN since his LIMA seems out of proportion to PFT abnormalities from 2018. RHC studies as above c/w group 1 PH. Likely due to combination of left-sided heart disease and possible valvular disease as well as hypoxemic lung disease (emphysema). Significant worsening of DLco over the last 2 years. He is quite symptomatic  - referral to PH specialist, Dr. Day to discuss role of pulmonary vasodilators.   - continue Lasix 80mg bid per Dr. Garrett  - CHF precautions reinforced.      #LLL nodule: decreasing in size on 3 month f/u scan after abx (last scan 2019). 9mm -> 7mm and associated with scarring  - no further imaging needed unless recurrent symptoms     #Hemoptysis in the past: no further recurrence. Likely was due to the prior LLL pneumonia in the setting of anticoagulation.  - continue INR goal close to 2, as discussed with Dr. Renee   - he'll let me know if he has any concerning symptoms.    #nocturnal hypoxemia:  - needs sleep study. Did not have time to address this today.     Follow up in 3 months.    CCx: follow up of COPD GOLD class D, chronic hypoxemic respiratory failure requiring suplpemental oxygen, CHF with volume overload.    HPI: Interim history: I last saw Buck on 12.17.21. Since that time, he reports he's doing about the same.  Leg swelling and weight are stable.  Continues to wear O2 24/7.  Continues to have significant dyspnea on exertion.  No cough or SOB.  Had RHC recently (results below).     ROS:  A 12-system review was obtained and was negative with the exception of the symptoms  endorsed in the history of present illness.    PMH:  Past Medical History:   Diagnosis Date     Anemia      Asthma without status asthmaticus 5/5/2021     BPH (benign prostatic hyperplasia)      COPD, group B, by GOLD 2017 classification (H)      Coronary artery disease due to calcified coronary lesion      Dyslipidemia, goal LDL below 70      Essential hypertension      History of cardiomyopathy      History of transfusion      Persistent atrial fibrillation (H)      Pneumonia of left lower lobe due to infectious organism 10/4/2017     Skin cancer of trunk      Status post catheter ablation of atrial fibrillation 6/7/2017    PVI 4-2011 (Cryo/PVI + roof line + CTI line) Re-do PVI 7-2011 (RFA/PVI + CFE + VIDYA + confirmed CTI line)     Ventricular tachycardia (H)        PSH:  Past Surgical History:   Procedure Laterality Date     CARDIAC DEFIBRILLATOR PLACEMENT       CARDIOVERSION  07/11/2018    x20, last 2/12/15, 10/2015, 11/18/16, 6/16/17 by Lauren Foster CNP     CARDIOVERSION  07/11/2018     COLONOSCOPY N/A 4/28/2017    Procedure: COLONOSCOPY with 2 ascending polyps and 1 transverse polyp;  Surgeon: Jose Whittington MD;  Location: [facility] GI;  Service:      CV CORONARY ANGIOGRAM N/A 9/20/2017    Procedure: Coronary Angiogram;  Surgeon: Sergio Cervantes MD;  Location: [facility] Lab;  Service:      EP ICD INSERT       FRACTURE SURGERY Left     wrist     INGUINAL HERNIA REPAIR Left 1967    [place] in the [employer] in [id number] after 13 month in [place]     INSERT / REPLACE / REMOVE PACEMAKER       left hand surgery---tendon repair       [place] ABLATE HEART DYSRHYTHM FOCUS  04/2011    Catheter Ablation Atrial Fibrillation PVI Apr 2011 (Cryo+RF-PVI + roof line + CTI line)     NV ABLATE HEART DYSRHYTHM FOCUS  07/2011    Re-do PVI Jul 2011 (RFA-PVI + CFE + VIDYA + confirmation of CTI line)     NV MYERS W/O FACETEC FORAMOT/DSKC 1/2 VRT SEG, CERVICAL      Laminectomy Lumbar;  Recorded: 03/09/2012;     TOTAL SHOULDER REPLACEMENT  Right 2016    Dr. Abernathy of [facility]       Allergies:  Allergies   Allergen Reactions     Adhesive Other (See Comments)     ADHESIVE TAPE; SKIN IRRITATION  Foam tape:  Skin removed with tape removal     Amiodarone      ADVERSE REACTION.  Sunlight sensitivity.     Lisinopril Cough       Family HX:  Family History   Problem Relation Age of Onset     Cancer Mother         leukemia     Cancer Father         bladder     Cancer Sister         breast with lung met.     Aneurysm Sister      CABG Brother      CABG Brother      Valvular heart disease Brother         valve replacement       Social Hx:  Social History     Socioeconomic History     Marital status:      Spouse name: Neela     Number of children: Not on file     Years of education: 12     Highest education level: Not on file   Occupational History     Occupation:      Employer: RETIRED     Occupation: [employer]     Comment: [place]   Social Needs     Financial resource strain: Not on file     Food insecurity     Worry: Not on file     Inability: Not on file     Transportation needs     Medical: Not on file     Non-medical: Not on file   Tobacco Use     Smoking status: Former Smoker     Packs/day: 1.00     Years: 4.00     Pack years: 4.00     Types: Cigarettes     Quit date: 1968     Years since quittin.3     Smokeless tobacco: Never Used   Substance and Sexual Activity     Alcohol use: Yes     Alcohol/week: 2.0 standard drinks     Types: 2 Cans of beer per week     Comment: 1 beer per week     Drug use: No     Sexual activity: Yes     Partners: Female     Birth control/protection: Post-menopausal   Lifestyle     Physical activity     Days per week: Not on file     Minutes per session: Not on file     Stress: Not on file   Relationships     Social connections     Talks on phone: Not on file     Gets together: Not on file     Attends Episcopal service: Not on file     Active member of club or organization: Not  on file     Attends meetings of clubs or organizations: Not on file     Relationship status: Not on file     Intimate partner violence     Fear of current or ex partner: Not on file     Emotionally abused: Not on file     Physically abused: Not on file     Forced sexual activity: Not on file   Other Topics Concern     Not on file   Social History Narrative     Not on file       Current Meds:  Current Outpatient Medications   Medication Sig Dispense Refill     ACETAMINOPHEN (TYLENOL ORAL) Take 1,000 mg by mouth 2 (two) times a day.        albuterol (PROAIR HFA;PROVENTIL HFA;VENTOLIN HFA) 90 mcg/actuation inhaler Inhale 2 puffs every 6 (six) hours as needed for wheezing. 1 Inhaler 11     amoxicillin (AMOXIL) 500 MG tablet Take prior to the Dentist       ascorbic acid (VITAMIN C) 1000 MG tablet Take 1,000 mg by mouth daily.       atorvastatin (LIPITOR) 40 MG tablet Take 40 mg by mouth at bedtime.       budesonide-formoterol (SYMBICORT) 160-4.5 mcg/actuation inhaler Inhale 2 puffs 2 (two) times a day.       co-enzyme Q-10 30 mg capsule Take 100 mg by mouth daily.       furosemide (LASIX) 40 MG tablet Take 1 tablet (40 mg total) by mouth daily. 90 tablet 3     losartan (COZAAR) 50 MG tablet Take 1 tablet (50 mg total) by mouth daily. 90 tablet 1     MAG 64 64 mg TbEC delayed-release tablet TAKE 1 TABLET(64 MG) BY MOUTH TWICE DAILY 180 tablet 1     multivitamin (MULTIVITAMIN) per tablet Take 1 tablet by mouth daily.       omega 3-dha-epa-fish oil (FISH OIL) 1,000 mg (120 mg-180 mg) cap Take 2 tablets by mouth 2 (two) times a day.        OXYGEN-AIR DELIVERY SYSTEMS [facility] Use As Directed. 2 L at rest/night and 3-4L with activity  [employer]       polyethylene glycol (MIRALAX) 17 gram packet Take 17 g by mouth daily.       sotaloL (BETAPACE) 80 MG tablet TAKE 2 TABLETS(160 MG) BY MOUTH TWICE DAILY. 360 tablet 0     tiotropium (SPIRIVA) 18 mcg inhalation capsule Place 18 mcg into inhaler and inhale daily.       VITAMIN D2 50,000  unit capsule Take 50,000 Units by mouth 2 (two) times a week. SUN and WED  3     warfarin ANTICOAGULANT (COUMADIN/JANTOVEN) 2.5 MG tablet Takes 1 tablet (2.5mg) to 2 tablets (5mg) by mouth daily, as directed.  Adjust dose based on INR results. 130 tablet 1     No current facility-administered medications for this visit.        Physical Exam:  There were no vitals taken for this visit.Gen: alert, oriented, no distress  HEENT: nasal turbinates are unremarkable, no oropharyngeal lesions, no cervical or supraclavicular lymphadenopathy  CV: irreg irreg, no M/G/R  Resp: clear lungs.  Abd: soft, nontender, no palpable organomegaly  Skin: no apparent rashes  Ext: 1+ pitting edema. Sig improvement from last visit.   Neuro: alert, nonfocal    Labs:  Reviewed  Dec 2018  Chem panel wnl  TSH wnl  hgb 12.1 Oct 2018    Previous testing  DONAN neg  blasto neg  Histo testing neg  c-ANCA neg  HIV neg  RF neg    Bronch/LLL BAL cultures neg    Imaging studies:  CT chest April 2018  IMPRESSION:   CONCLUSION:  1.  Mild scarring and slight bronchiectasis present at both lung bases. No active pneumonia identified.    CT chest 7/15/2019  IMPRESSION:   CONCLUSION:   1.  Nodular opacity of the left lower lobe of interest on 04/17/2019 is less conspicuous today and probably explained by scarring. Additional 6 month chest CT follow-up is recommended.  2.  Emphysema and scattered areas of scarring elsewhere in both lungs.  3.  Advanced multivessel coronary artery disease.    Echo Dec 2021  Interpretation Summary     1. Left ventricular systolic function is normal. The left ventricle is normal  in size. There is normal left ventricular wall thickness.Left ventricular  diastolic function is normal. No regional wall motion abnormalities noted.  2. The right ventricle is mildly dilated. Mildly decreased right ventricular  systolic functio.  3. There is mild to moderate (1-2+) tricuspid regurgitation.  4. The right ventricular systolic pressure is  approximated at 45.7mmHg plus  the right atrial pressure.Right ventricular systolic pressure is elevated,  consistent with moderate pulmonary hypertension. Normal RA pressure of 3 mmHg.  6. The ascending aorta is mildly dilated at 42 mm.    RHC 1/24/2022    Exertional dyspnea in a patient with known CAD and severe COPD.    Mild coronary artery disease with patent LAD stents.    LVEDP and PCWP are normal. The PA pressure was elevated at 66/24 mmHg with a mean pressure of 37 mmHg. See numbers below.    Shikha and TD cardiac outputs were both 5.7 l/min.    Sats on 2L NC oxygen: Ao 93%, PA 60%, RA 59%        PFTs 2018  FEV1/FVC is 0.56 and is reduced.  FEV1 is 73% predicted and is reduced.  FVC is 96% predicted and normal.  There was no improvement in spirometry after a single inhaled dose of bronchodilator.  TLC is 99% predicted and is normal.  RV is 113% predicted and is normal.  DLCO is 93% predicted and is normal when it   is corrected for hemoglobin.     Impression:  Full Pulmonary Function Test is abnormal.  PFTs are consistent with mild obstructive disease.  Spirometry is not consistent with reversibility.  There is no hyperinflation.  There is no air-trapping.  Diffusion capacity when corrected for hemoglobin is normal.  Declines in both FEV1 and TLC since 2009 with overall preservation of diffusing capacity.    PFTs 11/10/2021  FEV1 2.42L 68%  FVC 76%  No BD response  Ratio 0.66 (LLN 0.6)  DLco 46% han for hgb  Flow vol curve suggests obstruction.  Impression: no obstruction based on ratio >LLN but FV curve does suggest some obstruction. Significant decline in DLco compared to DLco.     July 2019  SIX MINUTE WALK TEST / HOME O2 EVALUATION     SpO2 at rest on RA 96%  SpO2 started to drop after 2 1/2 minutes walking. SpO2 after walking 2 1/2 minutes on RA was 88%  SpO2 after walking 6 minutes on RA was 87%  Distance covered 320.04 meters.  Recovery phase, SpO2 after 1 minute rest on RA was 87%  Recovery phase,  SpO2 after 2 minute rest on RA was 97%     Impression:   Significant desaturation with activities.   Patient does qualify for O2 supplementation with activity.    Hugh (Arnold) MD [place]  [facility] Pulmonary & Critical Care  Pager [phone]  Clinic [phone]            Again, thank you for allowing me to participate in the care of your patient.        Sincerely,        Hugh Payton MD

## 2022-02-10 NOTE — PATIENT INSTRUCTIONS
Patient Education     Pulmonary Hypertension  Pulmonary hypertension is high pressure in the blood vessels that carry blood into the lungs. This strains the lungs and heart and can lead to serious problems.    Systemic hypertension means the pressure is too high in blood vessels throughout the body. A person with pulmonary hypertension may also have high blood pressure throughout the body.  What causes pulmonary hypertension?  The cause of pulmonary hypertension is sometimes unknown. But it's most often caused by another health problem. In many cases, controlling this problem can help prevent or control pulmonary hypertension. Some of the most common causes of pulmonary hypertension are:  In children:    Severe lung problems in a     Lung conditions, such as cystic fibrosis or interstitial lung disease    Heart disease    Congenital heart defects    HIV infection    Other conditions, such as scleroderma, lupus, or sickle cell disease  In adults:    Lung conditions, such as chronic obstructive pulmonary disease (COPD), advanced bronchitis, cystic fibrosis, or pulmonary fibrosis    Liver disease    Blood clots in the lungs    Left-sided heart failure    HIV infection    Sleep apnea    Other conditions, such as scleroderma, lupus, or sickle cell disease  What are the symptoms of pulmonary hypertension?  Symptoms may come on suddenly. Or, they may come on slowly over time. Symptoms can include:    Shortness of breath    Blue lips or fingernails (signs that the body is having trouble getting oxygen)    Tiring quickly, especially when active    Fast heartbeat    Pain in the upper right side of the abdomen    Swelling in the legs or ankles    Chest pain or pressure    Fainting or dizzy spells  How is pulmonary hypertension diagnosed?  Your healthcare provider will examine you and listen to your heart and lungs. Your blood pressure will also be measured. Tests may be done to help find the cause. These may  include:    Blood tests. These measure certain body functions. They also check for problems such as infection.    Chest X-ray. This takes a picture of the inside of the chest. It can show certain heart and lung problems.    Electrocardiogram (ECG). This test records the heart s electrical activity.    Echocardiogram (echo). This test uses sound waves to create a moving picture of the heart.    Pulmonary function tests. These tests measure breathing and lung capacity.    Perfusion lung scan. This scan may be used to find changes in the arteries leading to the lungs and blood flow within the lungs and to identify blood clots.    CT scan of the chest. This test takes detailed pictures of the lungs.    6-minute walk test. This test measures your exercise tolerance and to check if your oxygen levels drop when you exert yourself.    Right heart catheterization (cath). This procedure n measures pressures in the heart and lungs. . A thin tube (catheter) is put into a blood vessel in the groin or neck and guided into the right side of the heart and to the pulmonary artery. This is the main artery that carries blood to your lungs. Certain blood pressure tests are then done. This is the only test that measures the pressure inside the pulmonary arteries.  How is pulmonary hypertension treated?   Treatment depends on your age, health, and the severity of your symptoms. Any underlying health problems you have will be treated. Treatment may also include:    Oxygen    Medicine to lower the pressure in the lung blood vessels    Medicine to help the body lose excess water    Medicine to prevent blood clots    Medicine that help the heart beat stronger, pump more blood and control abnormal heart rhythms  What are the long-term concerns?  Though pulmonary hypertension has no cure, certain treatments may relieve symptoms and slow progression of the disease. In rare and severe cases, a lung transplant may be needed. Your healthcare  provider can tell you more about this if needed.  When you should call your healthcare provider  Call your healthcare provider right away if you have any of the following:    Persistent blueness of lips or fingernails    Shortness of breath    Fever of 100.4  F ( 38.0 C ) or higher or as advised by your healthcare provider    Fainting spells    New symptoms or worsening of current symptoms  [employer] last reviewed this educational content on 6/1/2019 2000-2021 [employer]. All rights reserved. This information is not intended as a substitute for professional medical care. Always follow your healthcare professional's instructions.

## 2022-02-10 NOTE — PROGRESS NOTES
Pulmonary Clinic Follow-up Visit    Assessment/Plan:  76 year old male with a history of tobacco dependence in remission, CAD s/p PCI/stents, afib s/p PVL with ICD/PPM on anticoagulation, history of cavitary lesion and extensive pneumonia in LLL in Oct 2017, subsequent recurrent LLL pneumonia, since resolved, presenting for follow up. Repeat PFT's in 2020 actually showed improved FEV1/FVC ratio, stable FEV1 but substantial worsening of the DLco (almost 40% lower than in 2018).  Recently he presented with CHF and has been doing better with higher Lasix dose.  Recent RHC data was reviewed and showed mPAP of 37 mm Hg, normal PCWP, CO 5.7 L/min, TPG of 21 and PVR of 4.7 BLAIR.   This is consistent with group 1 PH. He could still have pulmonary arteriopathy due to some left sided heart disease as well as chronic lung disease.  I would like him to meet with our pulmonary hypertension expert to discuss pulmonary vasodilators. These could make his lung disease worse so the risk/benefits will need to be weighed carefully but I think it's reasonable to get input from cardiology on this.   I did recommend pulmonary rehab but he continues to decline.      Plan:  #COPD, GOLD class B (CAT >10, few exacerbations/low risk for hospitalization). Minimal smoking history so this is probably due to his work as a . PFTs 2017 showed mild obstruction and normal DLco. Mild exertional hypoxemia noted on 6MWT in 2019. Now on supplemental oxygen as of 2020.  - continue budesonide-formoterol 160-4.5 mcg two inhalations BID with spacer; rinse/gargle/spit water after use. No dose changes today.  - continue tiotropium HandiHaler one inhalation daily  - Cont albuterol as needed  - encouraged exercise, weight loss as able  - continue supplemental O2 for goal O2 sat 88-92%   - did not have time to address pulmonary rehab today.  - no indication for LDCT as his smoking history is too minimal.   - UTD w/ pneumonia, flu and covid-19 vaccines. I  am not sure if he got the booster - defer to his PMD to discuss.     #exertional hypoxemia, worsening DLco, significant LIMA: suspicion for PVD/pulm HTN since his LIMA seems out of proportion to PFT abnormalities from 2018. RHC studies as above c/w group 1 PH. Likely due to combination of left-sided heart disease and possible valvular disease as well as hypoxemic lung disease (emphysema). Significant worsening of DLco over the last 2 years. He is quite symptomatic  - referral to PH specialist, Dr. Day to discuss role of pulmonary vasodilators.   - continue Lasix 80mg bid per Dr. Garrett  - CHF precautions reinforced.      #LLL nodule: decreasing in size on 3 month f/u scan after abx (last scan 2019). 9mm -> 7mm and associated with scarring  - no further imaging needed unless recurrent symptoms     #Hemoptysis in the past: no further recurrence. Likely was due to the prior LLL pneumonia in the setting of anticoagulation.  - continue INR goal close to 2, as discussed with Dr. Renee   - he'll let me know if he has any concerning symptoms.    #nocturnal hypoxemia:  - needs sleep study. Did not have time to address this today.     Follow up in 3 months.    CCx: follow up of COPD GOLD class D, chronic hypoxemic respiratory failure requiring suplpemental oxygen, CHF with volume overload.    HPI: Interim history: I last saw Buck on 12.17.21. Since that time, he reports he's doing about the same.  Leg swelling and weight are stable.  Continues to wear O2 24/7.  Continues to have significant dyspnea on exertion.  No cough or SOB.  Had RHC recently (results below).     ROS:  A 12-system review was obtained and was negative with the exception of the symptoms endorsed in the history of present illness.    PMH:  Past Medical History:   Diagnosis Date     Anemia      Asthma without status asthmaticus 5/5/2021     BPH (benign prostatic hyperplasia)      COPD, group B, by GOLD 2017 classification (H)      Coronary artery  disease due to calcified coronary lesion      Dyslipidemia, goal LDL below 70      Essential hypertension      History of cardiomyopathy      History of transfusion      Persistent atrial fibrillation (H)      Pneumonia of left lower lobe due to infectious organism 10/4/2017     Skin cancer of trunk      Status post catheter ablation of atrial fibrillation 6/7/2017    PVI 4-2011 (Cryo/PVI + roof line + CTI line) Re-do PVI 7-2011 (RFA/PVI + CFE + VIDYA + confirmed CTI line)     Ventricular tachycardia (H)        PSH:  Past Surgical History:   Procedure Laterality Date     CARDIAC DEFIBRILLATOR PLACEMENT       CARDIOVERSION  07/11/2018    x20, last 2/12/15, 10/2015, 11/18/16, 6/16/17 by Lauren Foster CNP     CARDIOVERSION  07/11/2018     COLONOSCOPY N/A 4/28/2017    Procedure: COLONOSCOPY with 2 ascending polyps and 1 transverse polyp;  Surgeon: Jose Whittington MD;  Location: [facility];  Service:      CV CORONARY ANGIOGRAM N/A 9/20/2017    Procedure: Coronary Angiogram;  Surgeon: Sergio Cervantes MD;  Location: [facility];  Service:      EP ICD INSERT       FRACTURE SURGERY Left     wrist     INGUINAL HERNIA REPAIR Left 1967    while in the [employer] in [employer] after 13 month in [place]     INSERT / REPLACE / REMOVE PACEMAKER       left hand surgery---tendon repair       VT ABLATE HEART DYSRHYTHM FOCUS  04/2011    Catheter Ablation Atrial Fibrillation PVI Apr 2011 (Cryo+RF-PVI + roof line + CTI line)     VT ABLATE HEART DYSRHYTHM FOCUS  07/2011    Re-do PVI Jul 2011 (RFA-PVI + CFE + VIDYA + confirmation of CTI line)     VT MYERS W/O FACETEC FORAMOT/DSKC 1/2 VRT SEG, CERVICAL      Laminectomy Lumbar;  Recorded: 03/09/2012;     TOTAL SHOULDER REPLACEMENT Right 03/03/2016    Dr. Abernathy of [facility]       Allergies:  Allergies   Allergen Reactions     Adhesive Other (See Comments)     ADHESIVE TAPE; SKIN IRRITATION  Foam tape:  Skin removed with tape removal     Amiodarone      ADVERSE REACTION.   Sunlight sensitivity.     Lisinopril Cough       Family HX:  Family History   Problem Relation Age of Onset     Cancer Mother         leukemia     Cancer Father         bladder     Cancer Sister         breast with lung met.     Aneurysm Sister      CABG Brother      CABG Brother      Valvular heart disease Brother         valve replacement       Social Hx:  Social History     Socioeconomic History     Marital status:      Spouse name: Neela     Number of children: Not on file     Years of education: 12     Highest education level: Not on file   Occupational History     Occupation:      Employer: RETIRED     Occupation:  police     Comment: [place]   Social Needs     Financial resource strain: Not on file     Food insecurity     Worry: Not on file     Inability: Not on file     Transportation needs     Medical: Not on file     Non-medical: Not on file   Tobacco Use     Smoking status: Former Smoker     Packs/day: 1.00     Years: 4.00     Pack years: 4.00     Types: Cigarettes     Quit date: 1968     Years since quittin.3     Smokeless tobacco: Never Used   Substance and Sexual Activity     Alcohol use: Yes     Alcohol/week: 2.0 standard drinks     Types: 2 Cans of beer per week     Comment: 1 beer per week     Drug use: No     Sexual activity: Yes     Partners: Female     Birth control/protection: Post-menopausal   Lifestyle     Physical activity     Days per week: Not on file     Minutes per session: Not on file     Stress: Not on file   Relationships     Social connections     Talks on phone: Not on file     Gets together: Not on file     Attends [employer]oration[employer] service: Not on file     Active member of club or organization: Not on file     Attends meetings of clubs or organizations: Not on file     Relationship status: Not on file     Intimate partner violence     Fear of current or ex partner: Not on file     Emotionally abused: Not on file     Physically abused: Not on file      Forced sexual activity: Not on file   Other Topics Concern     Not on file   Social History Narrative     Not on file       Current Meds:  Current Outpatient Medications   Medication Sig Dispense Refill     ACETAMINOPHEN (TYLENOL ORAL) Take 1,000 mg by mouth 2 (two) times a day.        albuterol (PROAIR HFA;PROVENTIL HFA;VENTOLIN HFA) 90 mcg/actuation inhaler Inhale 2 puffs every 6 (six) hours as needed for wheezing. 1 Inhaler 11     amoxicillin (AMOXIL) 500 MG tablet Take prior to the Dentist       ascorbic acid (VITAMIN C) 1000 MG tablet Take 1,000 mg by mouth daily.       atorvastatin (LIPITOR) 40 MG tablet Take 40 mg by mouth at bedtime.       budesonide-formoterol (SYMBICORT) 160-4.5 mcg/actuation inhaler Inhale 2 puffs 2 (two) times a day.       co-enzyme Q-10 30 mg capsule Take 100 mg by mouth daily.       furosemide (LASIX) 40 MG tablet Take 1 tablet (40 mg total) by mouth daily. 90 tablet 3     losartan (COZAAR) 50 MG tablet Take 1 tablet (50 mg total) by mouth daily. 90 tablet 1     MAG 64 64 mg TbEC delayed-release tablet TAKE 1 TABLET(64 MG) BY MOUTH TWICE DAILY 180 tablet 1     multivitamin (MULTIVITAMIN) per tablet Take 1 tablet by mouth daily.       omega 3-dha-epa-fish oil (FISH OIL) 1,000 mg (120 mg-180 mg) cap Take 2 tablets by mouth 2 (two) times a day.        OXYGEN-AIR DELIVERY SYSTEMS [facility] Use As Directed. 2 L at rest/night and 3-4L with activity  [facility]       polyethylene glycol (MIRALAX) 17 gram packet Take 17 g by mouth daily.       sotaloL (BETAPACE) 80 MG tablet TAKE 2 TABLETS(160 MG) BY MOUTH TWICE DAILY. 360 tablet 0     tiotropium (SPIRIVA) 18 mcg inhalation capsule Place 18 mcg into inhaler and inhale daily.       VITAMIN D2 50,000 unit capsule Take 50,000 Units by mouth 2 (two) times a week. SUN and WED  3     warfarin ANTICOAGULANT (COUMADIN/JANTOVEN) 2.5 MG tablet Takes 1 tablet (2.5mg) to 2 tablets (5mg) by mouth daily, as directed.  Adjust dose based on INR results. 130 tablet 1      No current facility-administered medications for this visit.        Physical Exam:  There were no vitals taken for this visit.Gen: alert, oriented, no distress  HEENT: nasal turbinates are unremarkable, no oropharyngeal lesions, no cervical or supraclavicular lymphadenopathy  CV: irreg irreg, no M/G/R  Resp: clear lungs.  Abd: soft, nontender, no palpable organomegaly  Skin: no apparent rashes  Ext: 1+ pitting edema. Sig improvement from last visit.   Neuro: alert, nonfocal    Labs:  Reviewed  Dec 2018  Chem panel wnl  TSH wnl  hgb 12.1 Oct 2018    Previous testing  DONNA neg  blasto neg  Histo testing neg  c-ANCA neg  HIV neg  RF neg    Bronch/LLL BAL cultures neg    Imaging studies:  CT chest April 2018  IMPRESSION:   CONCLUSION:  1.  Mild scarring and slight bronchiectasis present at both lung bases. No active pneumonia identified.    CT chest 7/15/2019  IMPRESSION:   CONCLUSION:   1.  Nodular opacity of the left lower lobe of interest on 04/17/2019 is less conspicuous today and probably explained by scarring. Additional 6 month chest CT follow-up is recommended.  2.  Emphysema and scattered areas of scarring elsewhere in both lungs.  3.  Advanced multivessel coronary artery disease.    Echo Dec 2021  Interpretation Summary     1. Left ventricular systolic function is normal. The left ventricle is normal  in size. There is normal left ventricular wall thickness.Left ventricular  diastolic function is normal. No regional wall motion abnormalities noted.  2. The right ventricle is mildly dilated. Mildly decreased right ventricular  systolic functio.  3. There is mild to moderate (1-2+) tricuspid regurgitation.  4. The right ventricular systolic pressure is approximated at 45.7mmHg plus  the right atrial pressure.Right ventricular systolic pressure is elevated,  consistent with moderate pulmonary hypertension. Normal RA pressure of 3 mmHg.  6. The ascending aorta is mildly dilated at 42 mm.    RHC  1/24/2022    Exertional dyspnea in a patient with known CAD and severe COPD.    Mild coronary artery disease with patent LAD stents.    LVEDP and PCWP are normal. The PA pressure was elevated at 66/24 mmHg with a mean pressure of 37 mmHg. See numbers below.    Shikha and TD cardiac outputs were both 5.7 l/min.    Sats on 2L NC oxygen: Ao 93%, PA 60%, RA 59%        PFTs 2018  FEV1/FVC is 0.56 and is reduced.  FEV1 is 73% predicted and is reduced.  FVC is 96% predicted and normal.  There was no improvement in spirometry after a single inhaled dose of bronchodilator.  TLC is 99% predicted and is normal.  RV is 113% predicted and is normal.  DLCO is 93% predicted and is normal when it   is corrected for hemoglobin.     Impression:  Full Pulmonary Function Test is abnormal.  PFTs are consistent with mild obstructive disease.  Spirometry is not consistent with reversibility.  There is no hyperinflation.  There is no air-trapping.  Diffusion capacity when corrected for hemoglobin is normal.  Declines in both FEV1 and TLC since 2009 with overall preservation of diffusing capacity.    PFTs 11/10/2021  FEV1 2.42L 68%  FVC 76%  No BD response  Ratio 0.66 (LLN 0.6)  DLco 46% han for hgb  Flow vol curve suggests obstruction.  Impression: no obstruction based on ratio >LLN but FV curve does suggest some obstruction. Significant decline in DLco compared to DLco.     July 2019  SIX MINUTE WALK TEST / HOME O2 EVALUATION     SpO2 at rest on RA 96%  SpO2 started to drop after 2 1/2 minutes walking. SpO2 after walking 2 1/2 minutes on RA was 88%  SpO2 after walking 6 minutes on RA was 87%  Distance covered 320.04 meters.  Recovery phase, SpO2 after 1 minute rest on RA was 87%  Recovery phase, SpO2 after 2 minute rest on RA was 97%     Impression:   Significant desaturation with activities.   Patient does qualify for O2 supplementation with activity.    Hugh Payton MD (Avi)  [facility]  Pager [phone]  [phone]  [facility] [phone]

## 2022-02-11 ENCOUNTER — TELEPHONE (OUTPATIENT)
Dept: PULMONOLOGY | Facility: OTHER | Age: 77
End: 2022-02-11
Payer: COMMERCIAL

## 2022-02-11 NOTE — TELEPHONE ENCOUNTER
[facility] Call Center    Phone Message    May a detailed message be left on voicemail: yes     Reason for Call: Appointment Intake    Referring Provider Name: Dr. Hugh Payton  Diagnosis and/or Symptoms: Pulmonary Hypertension.    Action Taken: Other: Central Scheduling Pool    Travel Screening: Not Applicable

## 2022-02-13 ASSESSMENT — ENCOUNTER SYMPTOMS
SNORES LOUDLY: 0
COUGH: 1
WHEEZING: 1
SHORTNESS OF BREATH: 1
SPUTUM PRODUCTION: 1
POSTURAL DYSPNEA: 0
DYSPNEA ON EXERTION: 1
HEMOPTYSIS: 1
COUGH DISTURBING SLEEP: 1

## 2022-02-13 NOTE — PROGRESS NOTES
Service Date: February 15, 2022    Hugh Payton MD  Pulmonary  [facility]  [address]  [place] [address]      RE:  Buck Sinclair   MRN:  [id number]  :  1945      Dear Dr. Payton:    We had the pleasure of seeing Buck Sinclair at the [facility]. As you know, Mr. Sinclair is a very pleasant 76 year old male who is establishing care with us for evaluation and management of pulmonary hypertension. He has a history of:    1.  Chronic obstructive pulmonary disease  2.  Chronic hypoxemic respiratory failure  3.  Minimal smoking history, currently in remission  4.  Coronary artery disease status post PCI x3 to the proximal third distal left anterior descending artery on 2017  5.  Nonischemic cardiomyopathy with LVEF improved to 50%  6.  Persistent atrial fibrillation status post multiple cardioversions (>20), most recently on 2021. He underwent complex catheter ablation x2 in .  7.  Medtronic dual-chamber implantable cardiac defibrillator placement on 2014 for primary prevention of sudden cardiac death  8.  Benign essential hypertension  9.  Dyslipidemia  10.  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  He was initiated on supplemental oxygen 2 years ago and was started on 2 L/min and is currently on 6 L/min.  He does not have any shortness of breath at rest but has dyspnea when ambulating short distances including walking to the bathroom or when going up a flight of stairs.  Denies chest pain, orthopnea, and PND.  He has been having worsening lightheadedness but has not had any syncopal events.  He developed lower extremity swelling over the summer  which worsened in 2021, during which time his Lasix was uptitrated from 40 mg daily to 80 mg twice daily.  He was also cardioverted in 2021.  He was having  worsening fatigue when in atrial fibrillation.  Although he continues to have fatigue, he feels better after the cardioversion and possibly after diuresis.  No history of DVT, PE, liver disease, rheumatologic disease, or use of any diet pills or recreational drugs.    PAST MEDICAL HISTORY:  Past Medical History:   Diagnosis Date     Anemia      Asthma without status asthmaticus 5/5/2021     BPH (benign prostatic hyperplasia)      Cardiomyopathy (H)      COPD, group B, by GOLD 2017 classification (H)      Coronary artery disease due to calcified coronary lesion      Dyslipidemia, goal LDL below 70      Essential hypertension      History of transfusion      Persistent atrial fibrillation (H)      Pneumonia of left lower lobe due to infectious organism 10/4/2017     Skin cancer of trunk      Status post catheter ablation of atrial fibrillation 6/7/2017    PVI 4-2011 (Cryo/PVI + roof line + CTI line) Re-do PVI 7-2011 (RFA/PVI + CFE + VIDYA + confirmed CTI line)     Ventricular tachycardia (H)        PAST SURGICAL HISTORY:  Past Surgical History:   Procedure Laterality Date     CARDIAC DEFIBRILLATOR PLACEMENT       CARDIOVERSION  07/11/2018     CARDIOVERSION  07/11/2018     CARDIOVERSION  11/19/2021     COLONOSCOPY N/A 4/28/2017     CV CORONARY ANGIOGRAM N/A 9/20/2017     CV CORONARY ANGIOGRAM N/A 1/24/2022     CV LEFT HEART CATH N/A 1/24/2022     CV RIGHT AND LEFT HEART CATH N/A 1/24/2022     EP ICD INSERT       FRACTURE SURGERY Left      INGUINAL HERNIA REPAIR Left 1967     INSERT / REPLACE / REMOVE PACEMAKER       IR MISCELLANEOUS PROCEDURE  4/30/2014     OTHER SURGICAL HISTORY       AZ ABLATE HEART DYSRHYTHM FOCUS  04/2011     AZ ABLATE HEART DYSRHYTHM FOCUS  07/2011     TOTAL SHOULDER REPLACEMENT Right 03/03/2016     WRIST SURGERY Left      ZZC MYERS W/O FACETEC FORAMOT/DSKC 1/2 VRT SEG, CERVICAL         FAMILY HISTORY:  No history of pulmonary hypertension or rheumatologic disease.  Family History   Problem Relation  Age of Onset     Cancer Mother         leukemia     Cancer Father         bladder     Cancer Sister         breast with lung met.     Aneurysm Sister      CABG Brother      CABG Brother      Valvular heart disease Brother         valve replacement       SOCIAL HISTORY:  Was a  for 26 years, retired in .  Social History     Socioeconomic History     Marital status:      Spouse name: Not on file     Number of children: Not on file     Years of education: Not on file     Highest education level: Not on file   Occupational History     Not on file   Tobacco Use     Smoking status: Former Smoker     Packs/day: 1.00     Years: 4.00     Pack years: 4.00     Types: Cigarettes     Quit date: 1968     Years since quittin.1     Smokeless tobacco: Never Used   Substance and Sexual Activity     Alcohol use: Yes     Alcohol/week: 2.0 standard drinks     Comment: Alcoholic Drinks/day: 1 beer per week     Drug use: No     Sexual activity: Yes     Partners: Female     Birth control/protection: Post-menopausal   Other Topics Concern     Parent/sibling w/ CABG, MI or angioplasty before 65F 55M? Not Asked   Social History Narrative    Preloaded 2013     Social Determinants of Health     Financial Resource Strain: Not on file   Food Insecurity: Not on file   Transportation Needs: Not on file   Physical Activity: Not on file   Stress: Not on file   Social Connections: Not on file   Intimate Partner Violence: Not on file   Housing Stability: Not on file       CURRENT MEDICATIONS:  Current Outpatient Medications   Medication Sig     acetaminophen (TYLENOL) 325 MG tablet Take 650 mg by mouth 2 times daily     albuterol (PROAIR HFA/PROVENTIL HFA/VENTOLIN HFA) 108 (90 Base) MCG/ACT inhaler Inhale 2 puffs into the lungs every 4 hours as needed for shortness of breath / dyspnea or wheezing     amoxicillin (AMOXIL) 500 MG tablet Take 500-2,000 mg by mouth once as needed (TAKE 1 HOUR PRIOR TO DENTAL  "APPOINTMENTS.)      Ascorbic Acid (VITAMIN C) 500 MG CAPS Take 1,000 mg by mouth daily     atorvastatin (LIPITOR) 40 MG tablet Take 40 mg by mouth daily     budesonide-formoterol (SYMBICORT) 160-4.5 MCG/ACT Inhaler Inhale 2 puffs into the lungs 2 times daily     co-enzyme Q-10 100 MG CAPS capsule Take 100 mg by mouth daily      fish oil-omega-3 fatty acids 1000 MG capsule Take 2 g by mouth 2 times daily     FLUoxetine (PROZAC) 20 MG capsule TAKE 1 CAPSULE BY MOUTH EVERY DAY     furosemide (LASIX) 80 MG tablet Take 1 tablet (80 mg) by mouth 2 times daily     losartan (COZAAR) 50 MG tablet Take 1 tablet (50 mg) by mouth daily     magnesium chloride 535 (64 Mg) MG TBEC CR tablet Take 64 mg by mouth 2 times daily     multivitamin, therapeutic (THERA-VIT) TABS tablet Take 1 tablet by mouth daily     nitroGLYcerin (NITROSTAT) 0.4 MG sublingual tablet One tablet under the tongue every 5 minutes if needed for chest pain. May repeat every 5 minutes for a maximum of 3 doses in 15 minutes\"     OXYGEN-HELIUM IN 4-5 L      polyethylene glycol (MIRALAX) 17 GM/Dose powder Take 17 g by mouth daily      sotalol (BETAPACE) 160 MG tablet Take 160 mg by mouth 2 times daily     tiotropium (SPIRIVA) 18 MCG inhaled capsule Inhale 18 mcg into the lungs daily     vitamin D2 (ERGOCALCIFEROL) 33910 units (1250 mcg) capsule TAKE 1 CAPSULE BY MOUTH 2 TIMES A WEEK     warfarin ANTICOAGULANT (COUMADIN) 2.5 MG tablet Take 1 to 2 tablets (2.5 - 5 mg) daily by mouth. Adjust dose based on INR results as directed.     No current facility-administered medications for this visit.       ROS:   10 point ROS negative except as discussed in above HPI.    EXAM:  /87 (BP Location: Right arm, Patient Position: Sitting, Cuff Size: Adult Large)   Pulse 67   Ht 1.902 m (6' 2.88\")   Wt 123.1 kg (271 lb 6.4 oz)   SpO2 96%   BMI 34.03 kg/m    General: appears comfortable, alert and articulate  Head: normocephalic, atraumatic  Eyes: anicteric sclera, " EOMI  Neck: no adenopathy  Heart: distance heart sounds, regular, normal S1/S2, no murmur, gallop, rub, estimated JVP 12 cm, 2+ bilateral radial pulses  Lungs: clear to auscultation bilaterally, no rales or wheezing  Abdomen: soft, non-tender, bowel sounds present, no hepatosplenomegaly  Extremities: no clubbing, cyanosis or edema  Neurological: normal speech and affect, no gross motor deficits      Echocardiogram 12/8/21:  1. Left ventricular systolic function is normal. The left ventricle is normal  in size. There is normal left ventricular wall thickness.Left ventricular  diastolic function is normal. No regional wall motion abnormalities noted.  2. The right ventricle is mildly dilated. Mildly decreased right ventricular  systolic functio.  3. There is mild to moderate (1-2+) tricuspid regurgitation.  4. The right ventricular systolic pressure is approximated at 45.7mmHg plus  the right atrial pressure.Right ventricular systolic pressure is elevated,  consistent with moderate pulmonary hypertension. Normal RA pressure of 3 mmHg.  6. The ascending aorta is mildly dilated at 42 mm.    RHC and coronary angiogram 1/24/22:  RA: 10  RV: 55/14  PA: 66/24 (37)  PCWP: 14  TD CO: 5.7  PVR: 4.0    Mild coronary artery disease with patent LAD stents.    PFTs 11/10/20:  FVC: 3.69 L (76%)  FEV1: 2.42 L (68%)  FEV1/FVC: 0.66  DLCO corrected: 46%  No improvement with bronchodilator    CT chest without contrast 7/15/19:   1.  Nodular opacity of the left lower lobe of interest on 04/17/2019 is less conspicuous today and probably explained by scarring. Additional 6 month chest CT follow-up is recommended.  2.  Emphysema and scattered areas of scarring elsewhere in both lungs.  3.  Advanced multivessel coronary artery disease.    CT PE 10/18/17:  1.  No evidence of pulmonary embolus.  2.  New mild pneumonia superior segment of the left lower lobe.  3.  Previously seen cavitary nodule in the left lower lobe has resolved.  4.  Severe  coronary artery calcification.    6MWT 7/17/19: Ambulated 320 meters, lowest saturation 87% on room air      Assessment and Plan:     Buck Sinclair is a very pleasant 76 year old male with a history of chronic obstructive pulmonary disease, chronic hypoxemic respiratory failure, coronary artery disease, nonischemic cardiomyopathy, persistent atrial fibrillation, benign essential hypertension, and dyslipidemia who presents for evaluation and further management of pulmonary hypertension.    He has combined pre- and postcapillary pulmonary hypertension, likely due to both Group 2 (cardiac disease) and Group 3 (due to his COPD) pulmonary hypertension. He is WHO functional class IIIb. We will obtain our pulmonary hypertension serologic labs, 6 minute walk distance, a sleep study, V/Q scan, and discuss with our clinical trial team whether he would be a candidate for the PERFECT trial with inhaled Tyvaso. He declined pulmonary rehab referral and nephrology referral. We considered a nephrology referral due to his gradually worsening creatinine.    Return to clinic after completion of the above evaluation or follow-up in the clinical trial if he qualifies.    It was a pleasure seeing Buck Sinclair at the [facility] Pulmonary Hypertension Clinic. Please contact us with any questions or concerns that you may have.      Patient was seen and discussed with staff attending, Dr. Morales      Sincerely,      Michelle Villanueva MD, PhD  Cardiology Fellow    And    Jose Morales MD, PhD   of Medicine  [facility]  Cardiovascular Division  [facility]    Staff Attestation:   I have seen and examined this patient on February 15, 2022. I have discussed with the Dr. Villanueva and agree with the findings and plan in this note. I have personally reviewed vital signs, hemodynamics, medications, laboratory values, and diagnostic testing.     Group 3 PH due to COPD. We will  encourage more exercise, rule out other etiologies of PH, and then try to enroll in the PERFECT trial.    Jose Morales MD, PhD  Cardiovascular Division  [facility]     I spent a total of 60 minutes on the date of service evaluating this patient which included face to face discussion, performing a physical exam, reviewing of the chart to gain information from other providers to obtain further history, personally reviewing echocardiograms showing RV dysfunction and pulmonary hypertension, pulmonary function tests showing reduced DLCO, and hemodynamic tracings, ordering tests and/or medications, and documenting clinical information in the electronic health record.

## 2022-02-15 ENCOUNTER — OFFICE VISIT (OUTPATIENT)
Dept: CARDIOLOGY | Facility: CLINIC | Age: 77
End: 2022-02-15
Attending: FAMILY MEDICINE
Payer: OTHER MISCELLANEOUS

## 2022-02-15 ENCOUNTER — TRANSFERRED RECORDS (OUTPATIENT)
Dept: HEALTH INFORMATION MANAGEMENT | Facility: CLINIC | Age: 77
End: 2022-02-15

## 2022-02-15 ENCOUNTER — DOCUMENTATION ONLY (OUTPATIENT)
Dept: ANTICOAGULATION | Facility: CLINIC | Age: 77
End: 2022-02-15

## 2022-02-15 ENCOUNTER — LAB (OUTPATIENT)
Dept: LAB | Facility: CLINIC | Age: 77
End: 2022-02-15
Attending: INTERNAL MEDICINE
Payer: OTHER MISCELLANEOUS

## 2022-02-15 VITALS
OXYGEN SATURATION: 96 % | SYSTOLIC BLOOD PRESSURE: 131 MMHG | HEIGHT: 75 IN | WEIGHT: 271.4 LBS | BODY MASS INDEX: 33.74 KG/M2 | HEART RATE: 67 BPM | DIASTOLIC BLOOD PRESSURE: 87 MMHG

## 2022-02-15 DIAGNOSIS — Z11.59 ENCOUNTER FOR SCREENING FOR OTHER VIRAL DISEASES: ICD-10-CM

## 2022-02-15 DIAGNOSIS — I48.19 PERSISTENT ATRIAL FIBRILLATION (H): ICD-10-CM

## 2022-02-15 DIAGNOSIS — I27.21 PAH (PULMONARY ARTERY HYPERTENSION) (H): ICD-10-CM

## 2022-02-15 DIAGNOSIS — I50.812 CHRONIC RIGHT-SIDED HEART FAILURE (H): ICD-10-CM

## 2022-02-15 DIAGNOSIS — R06.09 DOE (DYSPNEA ON EXERTION): Primary | ICD-10-CM

## 2022-02-15 DIAGNOSIS — R06.09 DOE (DYSPNEA ON EXERTION): ICD-10-CM

## 2022-02-15 DIAGNOSIS — I50.9 HEART FAILURE (H): ICD-10-CM

## 2022-02-15 DIAGNOSIS — I48.91 ATRIAL FIBRILLATION (H): Primary | ICD-10-CM

## 2022-02-15 DIAGNOSIS — Z11.59 ENCOUNTER FOR SCREENING FOR OTHER VIRAL DISEASES: Primary | ICD-10-CM

## 2022-02-15 LAB
6 MIN WALK (FT): 760 FT
6 MIN WALK (M): 232 M
ALBUMIN SERPL-MCNC: 3.3 G/DL (ref 3.4–5)
ALP SERPL-CCNC: 56 U/L (ref 40–150)
ALT SERPL W P-5'-P-CCNC: 24 U/L (ref 0–70)
ANION GAP SERPL CALCULATED.3IONS-SCNC: 8 MMOL/L (ref 3–14)
AST SERPL W P-5'-P-CCNC: 23 U/L (ref 0–45)
BILIRUB DIRECT SERPL-MCNC: 0.2 MG/DL (ref 0–0.2)
BILIRUB SERPL-MCNC: 0.5 MG/DL (ref 0.2–1.3)
BUN SERPL-MCNC: 41 MG/DL (ref 7–30)
CALCIUM SERPL-MCNC: 9 MG/DL (ref 8.5–10.1)
CHLORIDE BLD-SCNC: 106 MMOL/L (ref 94–109)
CHOLEST SERPL-MCNC: 125 MG/DL
CO2 SERPL-SCNC: 26 MMOL/L (ref 20–32)
CREAT SERPL-MCNC: 1.63 MG/DL (ref 0.66–1.25)
CRP SERPL-MCNC: <2.9 MG/L (ref 0–8)
ERYTHROCYTE [DISTWIDTH] IN BLOOD BY AUTOMATED COUNT: 14.6 % (ref 10–15)
FASTING STATUS PATIENT QL REPORTED: YES
FIO2-PRE: 44 %
GFR SERPL CREATININE-BSD FRML MDRD: 43 ML/MIN/1.73M2
GLUCOSE BLD-MCNC: 120 MG/DL (ref 70–99)
HBV CORE AB SERPL QL IA: NONREACTIVE
HBV SURFACE AB SERPL IA-ACNC: 0 M[IU]/ML
HBV SURFACE AG SERPL QL IA: NONREACTIVE
HCT VFR BLD AUTO: 36.1 % (ref 40–53)
HCV AB SERPL QL IA: NONREACTIVE
HDLC SERPL-MCNC: 59 MG/DL
HGB BLD-MCNC: 11 G/DL (ref 13.3–17.7)
HIV 1+2 AB+HIV1 P24 AG SERPL QL IA: NONREACTIVE
INR HOME MONITORING: 2.8 (ref 2–3)
IRON SATN MFR SERPL: 33 % (ref 15–46)
IRON SERPL-MCNC: 96 UG/DL (ref 35–180)
LDLC SERPL CALC-MCNC: 55 MG/DL
MCH RBC QN AUTO: 30.9 PG (ref 26.5–33)
MCHC RBC AUTO-ENTMCNC: 30.5 G/DL (ref 31.5–36.5)
MCV RBC AUTO: 101 FL (ref 78–100)
NONHDLC SERPL-MCNC: 66 MG/DL
NT-PROBNP SERPL-MCNC: 2870 PG/ML (ref 0–450)
PLATELET # BLD AUTO: 166 10E3/UL (ref 150–450)
POTASSIUM BLD-SCNC: 3.7 MMOL/L (ref 3.4–5.3)
PROT SERPL-MCNC: 7.6 G/DL (ref 6.8–8.8)
RBC # BLD AUTO: 3.56 10E6/UL (ref 4.4–5.9)
SODIUM SERPL-SCNC: 140 MMOL/L (ref 133–144)
TIBC SERPL-MCNC: 294 UG/DL (ref 240–430)
TRIGL SERPL-MCNC: 55 MG/DL
TSH SERPL DL<=0.005 MIU/L-ACNC: 1.32 MU/L (ref 0.4–4)
WBC # BLD AUTO: 7.5 10E3/UL (ref 4–11)

## 2022-02-15 PROCEDURE — 86431 RHEUMATOID FACTOR QUANT: CPT | Performed by: INTERNAL MEDICINE

## 2022-02-15 PROCEDURE — 36415 COLL VENOUS BLD VENIPUNCTURE: CPT | Performed by: PATHOLOGY

## 2022-02-15 PROCEDURE — 86704 HEP B CORE ANTIBODY TOTAL: CPT | Performed by: INTERNAL MEDICINE

## 2022-02-15 PROCEDURE — 82248 BILIRUBIN DIRECT: CPT | Performed by: INTERNAL MEDICINE

## 2022-02-15 PROCEDURE — 80061 LIPID PANEL: CPT | Performed by: INTERNAL MEDICINE

## 2022-02-15 PROCEDURE — 87340 HEPATITIS B SURFACE AG IA: CPT | Performed by: INTERNAL MEDICINE

## 2022-02-15 PROCEDURE — 87389 HIV-1 AG W/HIV-1&-2 AB AG IA: CPT | Performed by: INTERNAL MEDICINE

## 2022-02-15 PROCEDURE — 80053 COMPREHEN METABOLIC PANEL: CPT | Performed by: INTERNAL MEDICINE

## 2022-02-15 PROCEDURE — 86140 C-REACTIVE PROTEIN: CPT | Performed by: INTERNAL MEDICINE

## 2022-02-15 PROCEDURE — G0463 HOSPITAL OUTPT CLINIC VISIT: HCPCS

## 2022-02-15 PROCEDURE — 86803 HEPATITIS C AB TEST: CPT | Performed by: INTERNAL MEDICINE

## 2022-02-15 PROCEDURE — 86706 HEP B SURFACE ANTIBODY: CPT | Performed by: INTERNAL MEDICINE

## 2022-02-15 PROCEDURE — 83529 ASAY OF INTERLEUKIN-6 (IL-6): CPT | Performed by: INTERNAL MEDICINE

## 2022-02-15 PROCEDURE — 84238 ASSAY NONENDOCRINE RECEPTOR: CPT | Mod: 90 | Performed by: PATHOLOGY

## 2022-02-15 PROCEDURE — 83550 IRON BINDING TEST: CPT | Mod: GA | Performed by: INTERNAL MEDICINE

## 2022-02-15 PROCEDURE — 84443 ASSAY THYROID STIM HORMONE: CPT | Performed by: INTERNAL MEDICINE

## 2022-02-15 PROCEDURE — 99000 SPECIMEN HANDLING OFFICE-LAB: CPT | Performed by: PATHOLOGY

## 2022-02-15 PROCEDURE — 83880 ASSAY OF NATRIURETIC PEPTIDE: CPT | Performed by: INTERNAL MEDICINE

## 2022-02-15 PROCEDURE — 85027 COMPLETE CBC AUTOMATED: CPT | Performed by: PATHOLOGY

## 2022-02-15 PROCEDURE — 94618 PULMONARY STRESS TESTING: CPT | Performed by: INTERNAL MEDICINE

## 2022-02-15 PROCEDURE — 86038 ANTINUCLEAR ANTIBODIES: CPT | Performed by: INTERNAL MEDICINE

## 2022-02-15 PROCEDURE — 99215 OFFICE O/P EST HI 40 MIN: CPT | Mod: GC | Performed by: INTERNAL MEDICINE

## 2022-02-15 ASSESSMENT — MIFFLIN-ST. JEOR: SCORE: 2044.81

## 2022-02-15 ASSESSMENT — PAIN SCALES - GENERAL: PAINLEVEL: NO PAIN (0)

## 2022-02-15 NOTE — NURSING NOTE
Procedures and/or Testing: Patient given instructions regarding  6 minute walk test,  V/Q Scan,. Discussed purpose, preparation, procedure and when to expect results reported back to the patient. Patient demonstrated understanding of this information and agreed to call with further questions or concerns.  Med Reconcile: Reviewed and verified all current medications with the patient. The updated medication list was printed and given to the patient.  Diet: Patient instructed regarding a heart healthy diet, including discussion of reduced fat and sodium intake. Patient demonstrated understanding of this information and agreed to call with further questions or concerns.  Return Appointment: Patient given instructions regarding scheduling next clinic visit. Patient demonstrated understanding of this information and agreed to call with further questions or concerns.  Patient stated he understood all health information given and agreed to call with further questions or concerns.     Follow up Appointment Information:  PH Labs today  6 muinute walk test, V/Q Scan, and Referral to Sleep    Follow up after testing     Referral to Research PERFECT Trial - They will reach out to you.

## 2022-02-15 NOTE — PATIENT INSTRUCTIONS
Medication Changes:   No medication changes at this time. Please continue current medication regiment.     Patient Instructions:  1. Continue staying active and eat a heart healthy diet.    2. Please keep current list of medications with you at all times.    3. Remember to weigh yourself daily after voiding and before you consume any food or beverages and log the numbers.  If you have gained 2 pounds overnight or 5 pounds in a week contact us immediately for medication adjustments or further instructions.    4. **Please call us immediately if you have any syncope (fainting or passing out), chest pain, edema (swelling or weight gain), or decline in your functional status (general decline in how you are feeling).    5. Patients on Remodulin (treprostinil) or Veletri (epoprostenol): Please make sure that you have your backup pump and supplies with you at all times, your mixing instructions, and contact information for your specialty pharmacy.    Follow up Appointment Information:  PH Labs today  6 muinute walk test, V/Q Scan, and Referral to Sleep    Follow up after testing     Referral to Research PERFECT Trial - They will reach out to you.    We are located on the third floor of [facility] on the [facility].  Our address is     [address] [place] [address] [place] [address] [place] [address]      Thank you for allowing us to be a part of your care here at the [facility]    If you have questions or concerns please contact us at:    Anabell Lemus, RN, BSN PHN  Rebeka Oviedo (Schedule,Prior Auth)  Nurse Coordinator     Clinic   Pulmonary Hypertension   Pulmonary Hypertension  [facility]  (Phone)[phone]   (Phone) [phone]        (Fax)[phone]                ** Please note that you will NOT receive a reminder call regarding your scheduled testing,  reminder calls are for provider appointments only.  If you are scheduled for testing within the [facility] system you may receive a call regarding pre-registration for billing purposes only.**     Support Group:  Pulmonary Hypertension Association  Https://www.phassociation.org/  **Look at the Events Tab** They even have Support Groups that you can call into    [facility] PH Support Group  Second Saturday of the Month from 1-3 PM   Location: [address] [place] [address]  Leader: Ritu Chavez  Phone: [phone] or [phone]  Email: [email or web address]

## 2022-02-15 NOTE — PROGRESS NOTES
ANTICOAGULATION  MANAGEMENT-Home Monitor Managed by Exception    Buck Sinclair 76 year old male is on warfarin with therapeutic INR result. (Goal INR 2.0-3.0)    Recent labs: (last 7 days)     02/15/22  0000   INR 2.80       Previous INR was Therapeutic  Medication, diet, health changes since last INR:chart reviewed; none identified  Contacted within the last 12 weeks by phone on 2/8/2022      LUCAS Jane was NOT contacted regarding therapeutic result today per home monitoring policy manage by exception agreement.   Current warfarin dose is to be continued:     Summary  As of 2/15/2022    Full warfarin instructions:  5 mg every Mon, Thu; 2.5 mg all other days   Next INR check:  2/22/2022               Marina Velazco RN  Anticoagulation Clinic  2/15/2022    _______________________________________________________________________     Anticoagulation Episode Summary     Current INR goal:  2.0-3.0   TTR:  90.1 % (1 y)   Target end date:  Indefinite   Send INR reminders to:  LARISA RICHARDS    Indications    Persistent Atrial Fibrillation [I48.91]  Persistent atrial fibrillation (H) [I48.19]           Comments:  Home monitor ( [employer] )managed by exception         Anticoagulation Care Providers     Provider Role Specialty Phone number    Erica Hansen APRN CNP Referring Cardiovascular Disease [phone]    Everett Renee MD  Cardiovascular Disease [phone]

## 2022-02-15 NOTE — LETTER
2/15/2022      RE: Bukc Sinclair  [address] [place]  [place] [address]       Dear Colleague,    Thank you for the opportunity to participate in the care of your patient, Buck Sinclair, at the [facility] [place] at [facility]. Please see a copy of my visit note below.    Service Date: February 15, 2022    Hugh Payton MD  Pulmonary  [facility]  [address]  [place] [address]      RE:  Buck Sinclair   MRN:  [id number]  :  1945      Dear Dr. Payton:    We had the pleasure of seeing Buck Sinclair at the [facility]. As you know, Mr. Sinclair is a very pleasant 76 year old male who is establishing care with us for evaluation and management of pulmonary hypertension. He has a history of:    1.  Chronic obstructive pulmonary disease  2.  Chronic hypoxemic respiratory failure  3.  Minimal smoking history, currently in remission  4.  Coronary artery disease status post PCI x3 to the proximal third distal left anterior descending artery on 2017  5.  Nonischemic cardiomyopathy with LVEF improved to 50%  6.  Persistent atrial fibrillation status post multiple cardioversions (>20), most recently on 2021. He underwent complex catheter ablation x2 in .  7.  Medtronic dual-chamber implantable cardiac defibrillator placement on 2014 for primary prevention of sudden cardiac death  8.  Benign essential hypertension  9.  Dyslipidemia  10.  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  He was initiated on supplemental oxygen 2 years ago and was started on 2 L/min and is currently on 6 L/min.  He does not have any shortness of breath at rest but has dyspnea when ambulating short distances including walking to the bathroom or when going up a flight of stairs.  Denies  chest pain, orthopnea, and PND.  He has been having worsening lightheadedness but has not had any syncopal events.  He developed lower extremity swelling over the summer 2021 which worsened in November 2021, during which time his Lasix was uptitrated from 40 mg daily to 80 mg twice daily.  He was also cardioverted in November 2021.  He was having worsening fatigue when in atrial fibrillation.  Although he continues to have fatigue, he feels better after the cardioversion and possibly after diuresis.  No history of DVT, PE, liver disease, rheumatologic disease, or use of any diet pills or recreational drugs.    PAST MEDICAL HISTORY:  Past Medical History:   Diagnosis Date     Anemia      Asthma without status asthmaticus 5/5/2021     BPH (benign prostatic hyperplasia)      Cardiomyopathy (H)      COPD, group B, by GOLD 2017 classification (H)      Coronary artery disease due to calcified coronary lesion      Dyslipidemia, goal LDL below 70      Essential hypertension      History of transfusion      Persistent atrial fibrillation (H)      Pneumonia of left lower lobe due to infectious organism 10/4/2017     Skin cancer of trunk      Status post catheter ablation of atrial fibrillation 6/7/2017    PVI 4-2011 (Cryo/PVI + roof line + CTI line) Re-do PVI 7-2011 (RFA/PVI + CFE + VIDYA + confirmed CTI line)     Ventricular tachycardia (H)        PAST SURGICAL HISTORY:  Past Surgical History:   Procedure Laterality Date     CARDIAC DEFIBRILLATOR PLACEMENT       CARDIOVERSION  07/11/2018     CARDIOVERSION  07/11/2018     CARDIOVERSION  11/19/2021     COLONOSCOPY N/A 4/28/2017     CV CORONARY ANGIOGRAM N/A 9/20/2017     CV CORONARY ANGIOGRAM N/A 1/24/2022     CV LEFT HEART CATH N/A 1/24/2022     CV RIGHT AND LEFT HEART CATH N/A 1/24/2022     EP ICD INSERT       FRACTURE SURGERY Left      INGUINAL HERNIA REPAIR Left 1967     INSERT / REPLACE / REMOVE PACEMAKER       IR MISCELLANEOUS PROCEDURE  4/30/2014     OTHER SURGICAL  HISTORY       OK ABLATE HEART DYSRHYTHM FOCUS  2011     OK ABLATE HEART DYSRHYTHM FOCUS  2011     TOTAL SHOULDER REPLACEMENT Right 2016     WRIST SURGERY Left      ZZC MYERS W/O FACETEC FORAMOT/DSKC  VRT SEG, CERVICAL         FAMILY HISTORY:  No history of pulmonary hypertension or rheumatologic disease.  Family History   Problem Relation Age of Onset     Cancer Mother         leukemia     Cancer Father         bladder     Cancer Sister         breast with lung met.     Aneurysm Sister      CABG Brother      CABG Brother      Valvular heart disease Brother         valve replacement       SOCIAL HISTORY:  Was a  for 26 years, retired in .  Social History     Socioeconomic History     Marital status:      Spouse name: Not on file     Number of children: Not on file     Years of education: Not on file     Highest education level: Not on file   Occupational History     Not on file   Tobacco Use     Smoking status: Former Smoker     Packs/day: 1.00     Years: 4.00     Pack years: 4.00     Types: Cigarettes     Quit date: 1968     Years since quittin.1     Smokeless tobacco: Never Used   Substance and Sexual Activity     Alcohol use: Yes     Alcohol/week: 2.0 standard drinks     Comment: Alcoholic Drinks/day: 1 beer per week     Drug use: No     Sexual activity: Yes     Partners: Female     Birth control/protection: Post-menopausal   Other Topics Concern     Parent/sibling w/ CABG, MI or angioplasty before 65F 55M? Not Asked   Social History Narrative    Preloaded 2013     Social Determinants of Health     Financial Resource Strain: Not on file   Food Insecurity: Not on file   Transportation Needs: Not on file   Physical Activity: Not on file   Stress: Not on file   Social Connections: Not on file   Intimate Partner Violence: Not on file   Housing Stability: Not on file       CURRENT MEDICATIONS:  Current Outpatient Medications   Medication Sig     acetaminophen (TYLENOL)  "325 MG tablet Take 650 mg by mouth 2 times daily     albuterol (PROAIR HFA/PROVENTIL HFA/VENTOLIN HFA) 108 (90 Base) MCG/ACT inhaler Inhale 2 puffs into the lungs every 4 hours as needed for shortness of breath / dyspnea or wheezing     amoxicillin (AMOXIL) 500 MG tablet Take 500-2,000 mg by mouth once as needed (TAKE 1 HOUR PRIOR TO DENTAL APPOINTMENTS.)      Ascorbic Acid (VITAMIN C) 500 MG CAPS Take 1,000 mg by mouth daily     atorvastatin (LIPITOR) 40 MG tablet Take 40 mg by mouth daily     budesonide-formoterol (SYMBICORT) 160-4.5 MCG/ACT Inhaler Inhale 2 puffs into the lungs 2 times daily     co-enzyme Q-10 100 MG CAPS capsule Take 100 mg by mouth daily      fish oil-omega-3 fatty acids 1000 MG capsule Take 2 g by mouth 2 times daily     FLUoxetine (PROZAC) 20 MG capsule TAKE 1 CAPSULE BY MOUTH EVERY DAY     furosemide (LASIX) 80 MG tablet Take 1 tablet (80 mg) by mouth 2 times daily     losartan (COZAAR) 50 MG tablet Take 1 tablet (50 mg) by mouth daily     magnesium chloride 535 (64 Mg) MG TBEC CR tablet Take 64 mg by mouth 2 times daily     multivitamin, therapeutic (THERA-VIT) TABS tablet Take 1 tablet by mouth daily     nitroGLYcerin (NITROSTAT) 0.4 MG sublingual tablet One tablet under the tongue every 5 minutes if needed for chest pain. May repeat every 5 minutes for a maximum of 3 doses in 15 minutes\"     OXYGEN-HELIUM IN 4-5 L      polyethylene glycol (MIRALAX) 17 GM/Dose powder Take 17 g by mouth daily      sotalol (BETAPACE) 160 MG tablet Take 160 mg by mouth 2 times daily     tiotropium (SPIRIVA) 18 MCG inhaled capsule Inhale 18 mcg into the lungs daily     vitamin D2 (ERGOCALCIFEROL) 10811 units (1250 mcg) capsule TAKE 1 CAPSULE BY MOUTH 2 TIMES A WEEK     warfarin ANTICOAGULANT (COUMADIN) 2.5 MG tablet Take 1 to 2 tablets (2.5 - 5 mg) daily by mouth. Adjust dose based on INR results as directed.     No current facility-administered medications for this visit.       ROS:   10 point ROS negative " "except as discussed in above HPI.    EXAM:  /87 (BP Location: Right arm, Patient Position: Sitting, Cuff Size: Adult Large)   Pulse 67   Ht 1.902 m (6' 2.88\")   Wt 123.1 kg (271 lb 6.4 oz)   SpO2 96%   BMI 34.03 kg/m    General: appears comfortable, alert and articulate  Head: normocephalic, atraumatic  Eyes: anicteric sclera, EOMI  Neck: no adenopathy  Heart: distance heart sounds, regular, normal S1/S2, no murmur, gallop, rub, estimated JVP 12 cm, 2+ bilateral radial pulses  Lungs: clear to auscultation bilaterally, no rales or wheezing  Abdomen: soft, non-tender, bowel sounds present, no hepatosplenomegaly  Extremities: no clubbing, cyanosis or edema  Neurological: normal speech and affect, no gross motor deficits      Echocardiogram 12/8/21:  1. Left ventricular systolic function is normal. The left ventricle is normal  in size. There is normal left ventricular wall thickness.Left ventricular  diastolic function is normal. No regional wall motion abnormalities noted.  2. The right ventricle is mildly dilated. Mildly decreased right ventricular  systolic functio.  3. There is mild to moderate (1-2+) tricuspid regurgitation.  4. The right ventricular systolic pressure is approximated at 45.7mmHg plus  the right atrial pressure.Right ventricular systolic pressure is elevated,  consistent with moderate pulmonary hypertension. Normal RA pressure of 3 mmHg.  6. The ascending aorta is mildly dilated at 42 mm.    RHC and coronary angiogram 1/24/22:  RA: 10  RV: 55/14  PA: 66/24 (37)  PCWP: 14  TD CO: 5.7  PVR: 4.0    Mild coronary artery disease with patent LAD stents.    PFTs 11/10/20:  FVC: 3.69 L (76%)  FEV1: 2.42 L (68%)  FEV1/FVC: 0.66  DLCO corrected: 46%  No improvement with bronchodilator    CT chest without contrast 7/15/19:   1.  Nodular opacity of the left lower lobe of interest on 04/17/2019 is less conspicuous today and probably explained by scarring. Additional 6 month chest CT follow-up is " recommended.  2.  Emphysema and scattered areas of scarring elsewhere in both lungs.  3.  Advanced multivessel coronary artery disease.    CT PE 10/18/17:  1.  No evidence of pulmonary embolus.  2.  New mild pneumonia superior segment of the left lower lobe.  3.  Previously seen cavitary nodule in the left lower lobe has resolved.  4.  Severe coronary artery calcification.    6MWT 7/17/19: Ambulated 320 meters, lowest saturation 87% on room air      Assessment and Plan:     Buck Sinclair is a very pleasant 76 year old male with a history of chronic obstructive pulmonary disease, chronic hypoxemic respiratory failure, coronary artery disease, nonischemic cardiomyopathy, persistent atrial fibrillation, benign essential hypertension, and dyslipidemia who presents for evaluation and further management of pulmonary hypertension.    He has combined pre- and postcapillary pulmonary hypertension, likely due to both Group 2 (cardiac disease) and Group 3 (due to his COPD) pulmonary hypertension. He is WHO functional class IIIb. We will obtain our pulmonary hypertension serologic labs, 6 minute walk distance, a sleep study, V/Q scan, and discuss with our clinical trial team whether he would be a candidate for the PERFECT trial with inhaled Tyvaso. He declined pulmonary rehab referral and nephrology referral. We considered a nephrology referral due to his gradually worsening creatinine.    Return to clinic after completion of the above evaluation or follow-up in the clinical trial if he qualifies.    It was a pleasure seeing Buck Sinclair at the [facility]. Please contact us with any questions or concerns that you may have.      Patient was seen and discussed with staff attending, Dr. Morales    Sincerely,    Michelle Villanueva MD, PhD  Cardiology Fellow    Staff Attestation:   I have seen and examined this patient on February 15, 2022. I have discussed with the Dr. Villanueva and agree  with the findings and plan in this note. I have personally reviewed vital signs, hemodynamics, medications, laboratory values, and diagnostic testing.     Group 3 PH due to COPD. We will encourage more exercise, rule out other etiologies of PH, and then try to enroll in the PERFECT trial.    Jose Morales MD, PhD  Cardiovascular Division  [facility]     I spent a total of 60 minutes on the date of service evaluating this patient which included face to face discussion, performing a physical exam, reviewing of the chart to gain information from other providers to obtain further history, personally reviewing echocardiograms showing RV dysfunction and pulmonary hypertension, pulmonary function tests showing reduced DLCO, and hemodynamic tracings, ordering tests and/or medications, and documenting clinical information in the electronic health record.          Please do not hesitate to contact me if you have any questions/concerns.     Sincerely,     Jose Morales MD

## 2022-02-15 NOTE — NURSING NOTE
Chief Complaint   Patient presents with     New Patient     New Pulmonary Hypertension   Vitals were taken and medications reconciled.    Stephen Harris, EMT  7:07 AM

## 2022-02-16 LAB
ANA SER QL IF: NEGATIVE
IL6 SERPL-MCNC: 5.58 PG/ML
RHEUMATOID FACT SER NEPH-ACNC: <7 IU/ML

## 2022-02-17 ENCOUNTER — LAB (OUTPATIENT)
Dept: FAMILY MEDICINE | Facility: CLINIC | Age: 77
End: 2022-02-17
Attending: INTERNAL MEDICINE
Payer: OTHER MISCELLANEOUS

## 2022-02-17 DIAGNOSIS — Z11.59 ENCOUNTER FOR SCREENING FOR OTHER VIRAL DISEASES: ICD-10-CM

## 2022-02-17 LAB
SARS-COV-2 RNA RESP QL NAA+PROBE: NEGATIVE
STFR SERPL-MCNC: 3.7 MG/L

## 2022-02-17 PROCEDURE — U0003 INFECTIOUS AGENT DETECTION BY NUCLEIC ACID (DNA OR RNA); SEVERE ACUTE RESPIRATORY SYNDROME CORONAVIRUS 2 (SARS-COV-2) (CORONAVIRUS DISEASE [COVID-19]), AMPLIFIED PROBE TECHNIQUE, MAKING USE OF HIGH THROUGHPUT TECHNOLOGIES AS DESCRIBED BY CMS-2020-01-R: HCPCS

## 2022-02-17 PROCEDURE — U0005 INFEC AGEN DETEC AMPLI PROBE: HCPCS

## 2022-02-18 ENCOUNTER — ANCILLARY PROCEDURE (OUTPATIENT)
Dept: CARDIOLOGY | Facility: CLINIC | Age: 77
End: 2022-02-18
Attending: INTERNAL MEDICINE
Payer: OTHER MISCELLANEOUS

## 2022-02-18 VITALS
WEIGHT: 267 LBS | SYSTOLIC BLOOD PRESSURE: 100 MMHG | RESPIRATION RATE: 16 BRPM | HEIGHT: 74 IN | BODY MASS INDEX: 34.27 KG/M2 | DIASTOLIC BLOOD PRESSURE: 66 MMHG | HEART RATE: 66 BPM

## 2022-02-18 DIAGNOSIS — I42.9 CARDIOMYOPATHY, UNSPECIFIED TYPE (H): Primary | ICD-10-CM

## 2022-02-18 DIAGNOSIS — Z95.810 ICD (IMPLANTABLE CARDIOVERTER-DEFIBRILLATOR) IN PLACE: ICD-10-CM

## 2022-02-18 DIAGNOSIS — Z95.810 ICD (IMPLANTABLE CARDIOVERTER-DEFIBRILLATOR) IN PLACE: Primary | Chronic | ICD-10-CM

## 2022-02-18 PROCEDURE — 99214 OFFICE O/P EST MOD 30 MIN: CPT | Performed by: INTERNAL MEDICINE

## 2022-02-18 PROCEDURE — 93283 PRGRMG EVAL IMPLANTABLE DFB: CPT | Performed by: INTERNAL MEDICINE

## 2022-02-18 NOTE — PATIENT INSTRUCTIONS
Buck Sinclair    Thank you for coming to the [facility] today for a cardiac evaluation  It was my pleasure to see you today  A good contact for any questions would be Val Angeles  RN @ [phone]    Device interrogation shows that you have had very little atrial fibrillation  Battery voltage is dropping and should be at replacement in about 7 months  [place] would suggest the pulmonary congestion as well controlled on current high-dose furosemide 80 mg twice daily  You are on high-dose sotalol; wondered about reducing the dose but it has been effective at keeping out of atrial fibrillation which is such a problem and you do have a defibrillator that would control for potentially dangerous arrhythmias prevent any bradycardia or pauses  We advise you to follow-up with Dr. Lizama

## 2022-02-18 NOTE — LETTER
2/18/2022    Vivek Guerrero MD  [facility] [address]  [facility] [address]    RE: Buck Sinclair       Dear Colleague,     I had the pleasure of seeing Buck Sinclair in the [facility].  [facility] Heart Care Note    Assessment:  Cardiology   Progress Notes       Signed   Encounter Date:  5/11/2021                   ischemia     [facility] Heart Care Note  Assessment: atrial fibrillation dating back to 1997 with multiple external cardioversions        Atrial fibrillation is very symptomatic with generalized weakness fatigue but not so much palpitations  Chronic amiodarone was partially effective;Continued for many years ; stopped because of photosensitive and other adverse effects   Failed Tikosyn;  Pulmonary vein isolation done on 2 occasions 2011   Post PVI Cardoversion February 12/ 2015 ; CV 10-         Cardioversion  11-        Atrial fibrillation April 25, 2017        CV 5-; relapse < 36 hour  Long-standing advanced dilated cardiomyopathy dating back to 1997       stress nuclear scan; April 30 2014 showed ejection fraction 47% without ischemia        Echocardiogram 21 September 2015 showed ejection fraction equals 50%       Stress nuclear scan 11- EF=63%       Transesophageal echocardiogram December 4, 2017; ejection fraction 50% sinus node dysfunction   MedtronicPacemaker implanted May 1999-dual atrial leads;         generator replacement May 2005          Removal of atrial and ventricular pacing leads with placement of ICD system 6 1 2014 and Medtronic single coil   Chronic anticoagulation with warfarin ; he had excessive skin bleeding from Eliquis   Obesity-substantial weight reduction on conscientious diet   COPD felt be related to previous  exposure   Hypertension  Hyperlipidemia well controlled on statin;   Coronary artery disease with stenting to proximal LAD September 20, 2017  Pulmonary hypertension           Plan:    Device interrogation shows that you have had very little atrial fibrillation  Battery voltage is dropping and should be at replacement in about 7 months  [place] would suggest the pulmonary congestion as well controlled on current high-dose furosemide 80 mg twice daily  You are on high-dose sotalol; wondered about reducing the dose but it has been effective at keeping out of atrial fibrillation which is such a problem and you do have a defibrillator that would control for potentially dangerous arrhythmias prevent any bradycardia or pauses  We advise you to follow-up with Dr. Lizama        Subjective:    I had the opportunity to see.Buck Sinclair , who is a 77 year old male with a known history of advanced restrictive lung disease with abnormal diffusion capacity and hypoxemia  Remains on 6 L nasal oxygen  Has poor exercise capacity become short of breath on slight exertion  Was quite edematous but now controlled while taking furosemide 80 mg twice daily  When he has atrial fibrillation becomes very symptomatic  Last cardioversion was November 19, 2021-has kept out of atrial fibrillation since that time with bursts of atrial arrhythmias  Heart catheterization entering 24 2022  No coronary artery disease, patent LAD stents  Wedge pressure normal  PA pressure 66/24 mean 37  Cardiac output 5.7 L    [place] to have pulmonary hypertension because of lung disease not LV dysfunction    Cholesterol 125  BNP 2870  Creatinine 1.63        Problem List:  Patient Active Problem List   Diagnosis     Dyslipidemia, goal LDL below 70     Essential hypertension     Persistent Atrial Fibrillation     Cardiomyopathy (H)     ICD (implantable cardioverter-defibrillator) in place     Status post catheter ablation of atrial fibrillation     LIMA (dyspnea on exertion)     Coronary artery disease due to calcified coronary lesion     Hemoptysis     COPD, group B, by GOLD 2017 classification (H)     Hypertrophic obstructive  cardiomyopathy (H)     Asthma without status asthmaticus     Mononeuritis     Obesity     Persistent atrial fibrillation (H)     Medical History:  Past Medical History:   Diagnosis Date     Anemia      Asthma without status asthmaticus 5/5/2021     BPH (benign prostatic hyperplasia)      Cardiomyopathy (H)      COPD, group B, by GOLD 2017 classification (H)      Coronary artery disease due to calcified coronary lesion      Dyslipidemia, goal LDL below 70      Essential hypertension      History of transfusion      Persistent atrial fibrillation (H)      Pneumonia of left lower lobe due to infectious organism 10/4/2017     Skin cancer of trunk      Status post catheter ablation of atrial fibrillation 6/7/2017    PVI 4-2011 (Cryo/PVI + roof line + CTI line) Re-do PVI 7-2011 (RFA/PVI + CFE + VIDYA + confirmed CTI line)     Ventricular tachycardia (H)      Surgical History:  Past Surgical History:   Procedure Laterality Date     CARDIAC DEFIBRILLATOR PLACEMENT       CARDIOVERSION  07/11/2018    x20, last 2/12/15, 10/2015, 11/18/16, 6/16/17 by Lauren Foster CNP     CARDIOVERSION  07/11/2018     CARDIOVERSION  11/19/2021     COLONOSCOPY N/A 4/28/2017    Procedure: COLONOSCOPY with 2 ascending polyps and 1 transverse polyp;  Surgeon: Jose Whittington MD;  Location: [facility];  Service:      CV CORONARY ANGIOGRAM N/A 9/20/2017    Procedure: Coronary Angiogram;  Surgeon: Sergio Cervantes MD;  Location: [facility] Cath Lab;  Service:      CV CORONARY ANGIOGRAM N/A 1/24/2022    Procedure: Coronary Angiogram;  Surgeon: Christi Saunders MD;  Location: [facility] LAB CV     CV LEFT HEART CATH N/A 1/24/2022    Procedure: Left Heart Cath;  Surgeon: Christi Saunders MD;  Location: [facility] LAB CV     CV RIGHT AND LEFT HEART CATH N/A 1/24/2022    Procedure: Right and Left Heart Catherization;  Surgeon: Christi Saunders MD;  Location: [facility] CATH LAB CV     EP ICD INSERT       FRACTURE SURGERY Left     wrist      INGUINAL HERNIA REPAIR Left     while in the [employer] in [place] after 13 month in [place]     INSERT / REPLACE / REMOVE PACEMAKER       IR MISCELLANEOUS PROCEDURE  2014     OTHER SURGICAL HISTORY      left hand surgery---tendon repair     DC ABLATE HEART DYSRHYTHM FOCUS  2011    Catheter Ablation Atrial Fibrillation PVI 2011 (Cryo+RF-PVI + roof line + CTI line)     DC ABLATE HEART DYSRHYTHM FOCUS  2011    Re-do PVI 2011 (RFA-PVI + CFE + VIDYA + confirmation of CTI line)     TOTAL SHOULDER REPLACEMENT Right 2016    Dr. Abernathy of [facility]     WRIST SURGERY Left      ZZC MYERS W/O FACETEC FORAMOT/DSKC  VRT SEG, CERVICAL      Laminectomy Lumbar;  Recorded: 2012;     Social History:  Social History     Socioeconomic History     Marital status:      Spouse name: Not on file     Number of children: Not on file     Years of education: Not on file     Highest education level: Not on file   Occupational History     Not on file   Tobacco Use     Smoking status: Former Smoker     Packs/day: 1.00     Years: 4.00     Pack years: 4.00     Types: Cigarettes     Quit date: 1968     Years since quittin.1     Smokeless tobacco: Never Used   Substance and Sexual Activity     Alcohol use: Yes     Alcohol/week: 2.0 standard drinks     Comment: Alcoholic Drinks/day: 1 beer per week     Drug use: No     Sexual activity: Yes     Partners: Female     Birth control/protection: Post-menopausal   Other Topics Concern     Parent/sibling w/ CABG, MI or angioplasty before 65F 55M? Not Asked   Social History Narrative    Preloaded 2013     Social Determinants of Health     Financial Resource Strain: Not on file   Food Insecurity: Not on file   Transportation Needs: Not on file   Physical Activity: Not on file   Stress: Not on file   Social Connections: Not on file   Intimate Partner Violence: Not on file   Housing Stability: Not on file       Review of Systems   ,         Family  History:  Family History   Problem Relation Age of Onset     Cancer Mother         leukemia     Cancer Father         bladder     Cancer Sister         breast with lung met.     Aneurysm Sister      CABG Brother      CABG Brother      Valvular heart disease Brother         valve replacement         Allergies:  Allergies   Allergen Reactions     Adhesive Tape Other (See Comments)     ADHESIVE TAPE; SKIN IRRITATION; Skin pulled off with foam tape       Amiodarone      ADVERSE REACTION.  Sunlight sensitivity.     Lisinopril        Medications:  Current Outpatient Medications   Medication Sig Dispense Refill     acetaminophen (TYLENOL) 325 MG tablet Take 650 mg by mouth 2 times daily       albuterol (PROAIR HFA/PROVENTIL HFA/VENTOLIN HFA) 108 (90 Base) MCG/ACT inhaler Inhale 2 puffs into the lungs every 4 hours as needed for shortness of breath / dyspnea or wheezing       amoxicillin (AMOXIL) 500 MG tablet Take 500-2,000 mg by mouth once as needed (TAKE 1 HOUR PRIOR TO DENTAL APPOINTMENTS.)        Ascorbic Acid (VITAMIN C) 500 MG CAPS Take 1,000 mg by mouth daily       atorvastatin (LIPITOR) 40 MG tablet Take 40 mg by mouth daily       budesonide-formoterol (SYMBICORT) 160-4.5 MCG/ACT Inhaler Inhale 2 puffs into the lungs 2 times daily       co-enzyme Q-10 100 MG CAPS capsule Take 100 mg by mouth daily        fish oil-omega-3 fatty acids 1000 MG capsule Take 2 g by mouth 2 times daily       FLUoxetine (PROZAC) 20 MG capsule TAKE 1 CAPSULE BY MOUTH EVERY DAY       furosemide (LASIX) 80 MG tablet Take 1 tablet (80 mg) by mouth 2 times daily 90 tablet 6     losartan (COZAAR) 50 MG tablet Take 1 tablet (50 mg) by mouth daily 90 tablet 3     magnesium chloride 535 (64 Mg) MG TBEC CR tablet Take 64 mg by mouth 2 times daily       multivitamin, therapeutic (THERA-VIT) TABS tablet Take 1 tablet by mouth daily       nitroGLYcerin (NITROSTAT) 0.4 MG sublingual tablet One tablet under the tongue every 5 minutes if needed for chest  "pain. May repeat every 5 minutes for a maximum of 3 doses in 15 minutes\" 25 tablet 3     OXYGEN-HELIUM IN 4-5 L        polyethylene glycol (MIRALAX) 17 GM/Dose powder Take 17 g by mouth daily        sotalol (BETAPACE) 160 MG tablet Take 160 mg by mouth 2 times daily       tiotropium (SPIRIVA) 18 MCG inhaled capsule Inhale 18 mcg into the lungs daily       vitamin D2 (ERGOCALCIFEROL) 74659 units (1250 mcg) capsule TAKE 1 CAPSULE BY MOUTH 2 TIMES A WEEK       warfarin ANTICOAGULANT (COUMADIN) 2.5 MG tablet Take 1 to 2 tablets (2.5 - 5 mg) daily by mouth. Adjust dose based on INR results as directed. 130 tablet 1         Surgical site  ICD well-healed to right subclavicular region    Device interrogation    Atrial paced 92%  Ventricular paced 1.8%  Very little atrial fibrillation since cardioversion just burst of atrial tachycardia/flutter  OptiVol satisfactory  Battery good for another 7 months  No ventricular tachycardia    Objective:   Vital signs:  /66 (BP Location: Right arm, Patient Position: Sitting, Cuff Size: Adult Large)   Pulse 66   Resp 16   Ht 1.88 m (6' 2\")   Wt 121.1 kg (267 lb)   BMI 34.28 kg/m    Nasal oxygen 6 L/min  Alert appropriate seems comfortable    Physical Exam:  H    GENERAL APPEARANCE: Alert, cooperative and in no acute distress.  HEENT: No scleral icterus. No Xanthelasma. Oral mucous membranes pink and moist.  NECK: JVP probably normal cm. No Hepatojugular reflux. Thyroid not  Palpable  CHEST: clear to auscultation and percussion  CARDIOVASCULAR: S1, S2 without murmur    Brachial, radial  pulses are intact and symmetric.   No carotid bruits noted.  ABDOMEN: Non tender. BS+. No bruits.  EXTREMITIES: No cyanosis, clubbing or edema.    Lab Results:  LIPIDS:  Lab Results   Component Value Date    CHOL 125 02/15/2022    CHOL 117 09/09/2021    CHOL 116 10/08/2020     Lab Results   Component Value Date    HDL 59 02/15/2022    HDL 54 09/09/2021    HDL 51 10/08/2020     No components " found for: LDLCALC  Lab Results   Component Value Date    TRIG 55 02/15/2022    TRIG 43 09/09/2021    TRIG 44 10/08/2020     No components found for: CHOLHDL    BMP:  Lab Results   Component Value Date    BUN 41 (H) 02/15/2022     02/15/2022    CO2 26 02/15/2022         This note has been dictated using voice recognition software. Any grammatical or context distortions are unintentional and inherent to the software.  Everett Renee MD  [facility] HEART [facility]  [phone]          Thank you for allowing me to participate in the care of your patient.      Sincerely,     Everett Renee MD, MD     [facility]  cc:   Everett Renee MD  [address] [place] [id number]

## 2022-02-18 NOTE — LETTER
2/18/2022       RE: Buck Sinclair  [address] [place] [address] Dr [place] [address]     Dear Colleague,    Thank you for referring your patient, Buck Sinclair, to the [facility] at [facility]. Please see a copy of my visit note below.    [facility] Note    Assessment:  Cardiology   Progress Notes       Signed   Encounter Date:  5/11/2021                   ischemia     [facility] Note  Assessment: atrial fibrillation dating back to 1997 with multiple external cardioversions        Atrial fibrillation is very symptomatic with generalized weakness fatigue but not so much palpitations  Chronic amiodarone was partially effective;Continued for many years ; stopped because of photosensitive and other adverse effects   Failed Tikosyn;  Pulmonary vein isolation done on 2 occasions 2011   Post PVI Cardoversion February 12/ 2015 ; CV 10-         Cardioversion  11-        Atrial fibrillation April 25, 2017        CV 5-; relapse < 36 hour  Long-standing advanced dilated cardiomyopathy dating back to 1997       stress nuclear scan; April 30 2014 showed ejection fraction 47% without ischemia        Echocardiogram 21 September 2015 showed ejection fraction equals 50%       Stress nuclear scan 11- EF=63%       Transesophageal echocardiogram December 4, 2017; ejection fraction 50% sinus node dysfunction   MedtronicPacemaker implanted May 1999-dual atrial leads;         generator replacement May 2005          Removal of atrial and ventricular pacing leads with placement of ICD system 6 1 2014 and Medtronic single coil   Chronic anticoagulation with warfarin ; he had excessive skin bleeding from Eliquis   Obesity-substantial weight reduction on conscientious diet   COPD felt be related to previous  exposure   Hypertension  Hyperlipidemia well controlled on statin;   Coronary artery disease with stenting  to proximal LAD September 20, 2017  Pulmonary hypertension          Plan:    Device interrogation shows that you have had very little atrial fibrillation  Battery voltage is dropping and should be at replacement in about 7 months  [place] would suggest the pulmonary congestion as well controlled on current high-dose furosemide 80 mg twice daily  You are on high-dose sotalol; wondered about reducing the dose but it has been effective at keeping out of atrial fibrillation which is such a problem and you do have a defibrillator that would control for potentially dangerous arrhythmias prevent any bradycardia or pauses  We advise you to follow-up with Dr. Lizama        Subjective:    I had the opportunity to see.Buck Sinclair , who is a 77 year old male with a known history of advanced restrictive lung disease with abnormal diffusion capacity and hypoxemia  Remains on 6 L nasal oxygen  Has poor exercise capacity become short of breath on slight exertion  Was quite edematous but now controlled while taking furosemide 80 mg twice daily  When he has atrial fibrillation becomes very symptomatic  Last cardioversion was November 19, 2021-has kept out of atrial fibrillation since that time with bursts of atrial arrhythmias  Heart catheterization entering 24 2022  No coronary artery disease, patent LAD stents  Wedge pressure normal  PA pressure 66/24 mean 37  Cardiac output 5.7 L    [place] to have pulmonary hypertension because of lung disease not LV dysfunction    Cholesterol 125  BNP 2870  Creatinine 1.63        Problem List:  Patient Active Problem List   Diagnosis     Dyslipidemia, goal LDL below 70     Essential hypertension     Persistent Atrial Fibrillation     Cardiomyopathy (H)     ICD (implantable cardioverter-defibrillator) in place     Status post catheter ablation of atrial fibrillation     LIMA (dyspnea on exertion)     Coronary artery disease due to calcified coronary lesion     Hemoptysis     COPD, group B, by  GOLD 2017 classification (H)     Hypertrophic obstructive cardiomyopathy (H)     Asthma without status asthmaticus     Mononeuritis     Obesity     Persistent atrial fibrillation (H)     Medical History:  Past Medical History:   Diagnosis Date     Anemia      Asthma without status asthmaticus 5/5/2021     BPH (benign prostatic hyperplasia)      Cardiomyopathy (H)      COPD, group B, by GOLD 2017 classification (H)      Coronary artery disease due to calcified coronary lesion      Dyslipidemia, goal LDL below 70      Essential hypertension      History of transfusion      Persistent atrial fibrillation (H)      Pneumonia of left lower lobe due to infectious organism 10/4/2017     Skin cancer of trunk      Status post catheter ablation of atrial fibrillation 6/7/2017    PVI 4-2011 (Cryo/PVI + roof line + CTI line) Re-do PVI 7-2011 (RFA/PVI + CFE + VIDYA + confirmed CTI line)     Ventricular tachycardia (H)      Surgical History:  Past Surgical History:   Procedure Laterality Date     CARDIAC DEFIBRILLATOR PLACEMENT       CARDIOVERSION  07/11/2018    x20, last 2/12/15, 10/2015, 11/18/16, 6/16/17 by Lauren Foster CNP     CARDIOVERSION  07/11/2018     CARDIOVERSION  11/19/2021     COLONOSCOPY N/A 4/28/2017    Procedure: COLONOSCOPY with 2 ascending polyps and 1 transverse polyp;  Surgeon: Jose Whittington MD;  Location: [facility] GI;  Service:      CV CORONARY ANGIOGRAM N/A 9/20/2017    Procedure: Coronary Angiogram;  Surgeon: Sergio Cervantes MD;  Location: [facility] Cath Lab;  Service:      CV CORONARY ANGIOGRAM N/A 1/24/2022    Procedure: Coronary Angiogram;  Surgeon: Christi Saunders MD;  Location: [facility] CATH LAB CV     CV LEFT HEART CATH N/A 1/24/2022    Procedure: Left Heart Cath;  Surgeon: Christi Saunders MD;  Location: [facility] CATH LAB CV     CV RIGHT AND LEFT HEART CATH N/A 1/24/2022    Procedure: Right and Left Heart Catherization;  Surgeon: Christi Saunders MD;  Location: [facility] CATH LAB CV     EP  ICD INSERT       FRACTURE SURGERY Left     wrist     INGUINAL HERNIA REPAIR Left     while in the [employer] in [place] after 13 month in [place]     INSERT / REPLACE / REMOVE PACEMAKER       IR MISCELLANEOUS PROCEDURE  2014     OTHER SURGICAL HISTORY      left hand surgery---tendon repair     AL ABLATE HEART DYSRHYTHM FOCUS  2011    Catheter Ablation Atrial Fibrillation PVI 2011 (Cryo+RF-PVI + roof line + CTI line)     AL ABLATE HEART DYSRHYTHM FOCUS  2011    Re-do PVI 2011 (RFA-PVI + CFE + VIDYA + confirmation of CTI line)     TOTAL SHOULDER REPLACEMENT Right 2016    Dr. Abernathy of [facility]     WRIST SURGERY Left      ZZC MYERS W/O FACETEC FORAMOT/DSKC  VRT SEG, CERVICAL      Laminectomy Lumbar;  Recorded: 2012;     Social History:  Social History     Socioeconomic History     Marital status:      Spouse name: Not on file     Number of children: Not on file     Years of education: Not on file     Highest education level: Not on file   Occupational History     Not on file   Tobacco Use     Smoking status: Former Smoker     Packs/day: 1.00     Years: 4.00     Pack years: 4.00     Types: Cigarettes     Quit date: 1968     Years since quittin.1     Smokeless tobacco: Never Used   Substance and Sexual Activity     Alcohol use: Yes     Alcohol/week: 2.0 standard drinks     Comment: Alcoholic Drinks/day: 1 beer per week     Drug use: No     Sexual activity: Yes     Partners: Female     Birth control/protection: Post-menopausal   Other Topics Concern     Parent/sibling w/ CABG, MI or angioplasty before 65F 55M? Not Asked   Social History Narrative    Preloaded 2013     Social Determinants of Health     Financial Resource Strain: Not on file   Food Insecurity: Not on file   Transportation Needs: Not on file   Physical Activity: Not on file   Stress: Not on file   Social Connections: Not on file   Intimate Partner Violence: Not on file   Housing Stability: Not  on file       Review of Systems   ,         Family History:  Family History   Problem Relation Age of Onset     Cancer Mother         leukemia     Cancer Father         bladder     Cancer Sister         breast with lung met.     Aneurysm Sister      CABG Brother      CABG Brother      Valvular heart disease Brother         valve replacement         Allergies:  Allergies   Allergen Reactions     Adhesive Tape Other (See Comments)     ADHESIVE TAPE; SKIN IRRITATION; Skin pulled off with foam tape       Amiodarone      ADVERSE REACTION.  Sunlight sensitivity.     Lisinopril        Medications:  Current Outpatient Medications   Medication Sig Dispense Refill     acetaminophen (TYLENOL) 325 MG tablet Take 650 mg by mouth 2 times daily       albuterol (PROAIR HFA/PROVENTIL HFA/VENTOLIN HFA) 108 (90 Base) MCG/ACT inhaler Inhale 2 puffs into the lungs every 4 hours as needed for shortness of breath / dyspnea or wheezing       amoxicillin (AMOXIL) 500 MG tablet Take 500-2,000 mg by mouth once as needed (TAKE 1 HOUR PRIOR TO DENTAL APPOINTMENTS.)        Ascorbic Acid (VITAMIN C) 500 MG CAPS Take 1,000 mg by mouth daily       atorvastatin (LIPITOR) 40 MG tablet Take 40 mg by mouth daily       budesonide-formoterol (SYMBICORT) 160-4.5 MCG/ACT Inhaler Inhale 2 puffs into the lungs 2 times daily       co-enzyme Q-10 100 MG CAPS capsule Take 100 mg by mouth daily        fish oil-omega-3 fatty acids 1000 MG capsule Take 2 g by mouth 2 times daily       FLUoxetine (PROZAC) 20 MG capsule TAKE 1 CAPSULE BY MOUTH EVERY DAY       furosemide (LASIX) 80 MG tablet Take 1 tablet (80 mg) by mouth 2 times daily 90 tablet 6     losartan (COZAAR) 50 MG tablet Take 1 tablet (50 mg) by mouth daily 90 tablet 3     magnesium chloride 535 (64 Mg) MG TBEC CR tablet Take 64 mg by mouth 2 times daily       multivitamin, therapeutic (THERA-VIT) TABS tablet Take 1 tablet by mouth daily       nitroGLYcerin (NITROSTAT) 0.4 MG sublingual tablet One tablet  "under the tongue every 5 minutes if needed for chest pain. May repeat every 5 minutes for a maximum of 3 doses in 15 minutes\" 25 tablet 3     OXYGEN-HELIUM IN 4-5 L        polyethylene glycol (MIRALAX) 17 GM/Dose powder Take 17 g by mouth daily        sotalol (BETAPACE) 160 MG tablet Take 160 mg by mouth 2 times daily       tiotropium (SPIRIVA) 18 MCG inhaled capsule Inhale 18 mcg into the lungs daily       vitamin D2 (ERGOCALCIFEROL) 07639 units (1250 mcg) capsule TAKE 1 CAPSULE BY MOUTH 2 TIMES A WEEK       warfarin ANTICOAGULANT (COUMADIN) 2.5 MG tablet Take 1 to 2 tablets (2.5 - 5 mg) daily by mouth. Adjust dose based on INR results as directed. 130 tablet 1         Surgical site  ICD well-healed to right subclavicular region    Device interrogation    Atrial paced 92%  Ventricular paced 1.8%  Very little atrial fibrillation since cardioversion just burst of atrial tachycardia/flutter  OptiVol satisfactory  Battery good for another 7 months  No ventricular tachycardia    Objective:   Vital signs:  /66 (BP Location: Right arm, Patient Position: Sitting, Cuff Size: Adult Large)   Pulse 66   Resp 16   Ht 1.88 m (6' 2\")   Wt 121.1 kg (267 lb)   BMI 34.28 kg/m    Nasal oxygen 6 L/min  Alert appropriate seems comfortable    Physical Exam:  H    GENERAL APPEARANCE: Alert, cooperative and in no acute distress.  HEENT: No scleral icterus. No Xanthelasma. Oral mucous membranes pink and moist.  NECK: JVP probably normal cm. No Hepatojugular reflux. Thyroid not  Palpable  CHEST: clear to auscultation and percussion  CARDIOVASCULAR: S1, S2 without murmur    Brachial, radial  pulses are intact and symmetric.   No carotid bruits noted.  ABDOMEN: Non tender. BS+. No bruits.  EXTREMITIES: No cyanosis, clubbing or edema.    Lab Results:  LIPIDS:  Lab Results   Component Value Date    CHOL 125 02/15/2022    CHOL 117 09/09/2021    CHOL 116 10/08/2020     Lab Results   Component Value Date    HDL 59 02/15/2022    HDL 54 " 09/09/2021    HDL 51 10/08/2020     No components found for: LDLCALC  Lab Results   Component Value Date    TRIG 55 02/15/2022    TRIG 43 09/09/2021    TRIG 44 10/08/2020     No components found for: CHOLHDL    BMP:  Lab Results   Component Value Date    BUN 41 (H) 02/15/2022     02/15/2022    CO2 26 02/15/2022     This note has been dictated using voice recognition software. Any grammatical or context distortions are unintentional and inherent to the software.      Everett Renee MD  [facility]  [phone]

## 2022-02-18 NOTE — PROGRESS NOTES
[facility] Note    Assessment:  Cardiology   Progress Notes       Signed   Encounter Date:  5/11/2021                   ischemia     [facility] Note  Assessment: atrial fibrillation dating back to 1997 with multiple external cardioversions        Atrial fibrillation is very symptomatic with generalized weakness fatigue but not so much palpitations  Chronic amiodarone was partially effective;Continued for many years ; stopped because of photosensitive and other adverse effects   Failed Tikosyn;  Pulmonary vein isolation done on 2 occasions 2011   Post PVI Cardoversion February 12/ 2015 ; CV 10-         Cardioversion  11-        Atrial fibrillation April 25, 2017        CV 5-; relapse < 36 hour  Long-standing advanced dilated cardiomyopathy dating back to 1997       stress nuclear scan; April 30 2014 showed ejection fraction 47% without ischemia        Echocardiogram 21 September 2015 showed ejection fraction equals 50%       Stress nuclear scan 11- EF=63%       Transesophageal echocardiogram December 4, 2017; ejection fraction 50% sinus node dysfunction   MedtronicPacemaker implanted May 1999-dual atrial leads;         generator replacement May 2005          Removal of atrial and ventricular pacing leads with placement of ICD system 6 1 2014 and Medtronic single coil   Chronic anticoagulation with warfarin ; he had excessive skin bleeding from Eliquis   Obesity-substantial weight reduction on conscientious diet   COPD felt be related to previous  exposure   Hypertension  Hyperlipidemia well controlled on statin;   Coronary artery disease with stenting to proximal LAD September 20, 2017  Pulmonary hypertension          Plan:    Device interrogation shows that you have had very little atrial fibrillation  Battery voltage is dropping and should be at replacement in about 7 months  [place] would suggest the pulmonary congestion as well controlled on current  high-dose furosemide 80 mg twice daily  You are on high-dose sotalol; wondered about reducing the dose but it has been effective at keeping out of atrial fibrillation which is such a problem and you do have a defibrillator that would control for potentially dangerous arrhythmias prevent any bradycardia or pauses  We advise you to follow-up with Dr. Lizama        Subjective:    I had the opportunity to see.Buck Sinclair , who is a 77 year old male with a known history of advanced restrictive lung disease with abnormal diffusion capacity and hypoxemia  Remains on 6 L nasal oxygen  Has poor exercise capacity become short of breath on slight exertion  Was quite edematous but now controlled while taking furosemide 80 mg twice daily  When he has atrial fibrillation becomes very symptomatic  Last cardioversion was November 19, 2021-has kept out of atrial fibrillation since that time with bursts of atrial arrhythmias  Heart catheterization entering 24 2022  No coronary artery disease, patent LAD stents  Wedge pressure normal  PA pressure 66/24 mean 37  Cardiac output 5.7 L    [place] to have pulmonary hypertension because of lung disease not LV dysfunction    Cholesterol 125  BNP 2870  Creatinine 1.63        Problem List:  Patient Active Problem List   Diagnosis     Dyslipidemia, goal LDL below 70     Essential hypertension     Persistent Atrial Fibrillation     Cardiomyopathy (H)     ICD (implantable cardioverter-defibrillator) in place     Status post catheter ablation of atrial fibrillation     LIMA (dyspnea on exertion)     Coronary artery disease due to calcified coronary lesion     Hemoptysis     COPD, group B, by GOLD 2017 classification (H)     Hypertrophic obstructive cardiomyopathy (H)     Asthma without status asthmaticus     Mononeuritis     Obesity     Persistent atrial fibrillation (H)     Medical History:  Past Medical History:   Diagnosis Date     Anemia      Asthma without status asthmaticus 5/5/2021      BPH (benign prostatic hyperplasia)      Cardiomyopathy (H)      COPD, group B, by GOLD 2017 classification (H)      Coronary artery disease due to calcified coronary lesion      Dyslipidemia, goal LDL below 70      Essential hypertension      History of transfusion      Persistent atrial fibrillation (H)      Pneumonia of left lower lobe due to infectious organism 10/4/2017     Skin cancer of trunk      Status post catheter ablation of atrial fibrillation 6/7/2017    PVI 4-2011 (Cryo/PVI + roof line + CTI line) Re-do PVI 7-2011 (RFA/PVI + CFE + VIDYA + confirmed CTI line)     Ventricular tachycardia (H)      Surgical History:  Past Surgical History:   Procedure Laterality Date     CARDIAC DEFIBRILLATOR PLACEMENT       CARDIOVERSION  07/11/2018    x20, last 2/12/15, 10/2015, 11/18/16, 6/16/17 by Lauren Foster CNP     CARDIOVERSION  07/11/2018     CARDIOVERSION  11/19/2021     COLONOSCOPY N/A 4/28/2017    Procedure: COLONOSCOPY with 2 ascending polyps and 1 transverse polyp;  Surgeon: Jose Whittington MD;  Location: [facility];  Service:      CV CORONARY ANGIOGRAM N/A 9/20/2017    Procedure: Coronary Angiogram;  Surgeon: Sergio Cervantes MD;  Location: [facility] Cath Lab;  Service:      CV CORONARY ANGIOGRAM N/A 1/24/2022    Procedure: Coronary Angiogram;  Surgeon: Christi Saunders MD;  Location: [facility] LAB CV     CV LEFT HEART CATH N/A 1/24/2022    Procedure: Left Heart Cath;  Surgeon: Christi Saunders MD;  Location: [facility] CATH LAB CV     CV RIGHT AND LEFT HEART CATH N/A 1/24/2022    Procedure: Right and Left Heart Catherization;  Surgeon: Christi Saunders MD;  Location: [facility] CATH LAB CV     EP ICD INSERT       FRACTURE SURGERY Left     wrist     INGUINAL HERNIA REPAIR Left 1967    while in the [employer] in [place] after 13 month in [place]     INSERT / REPLACE / REMOVE PACEMAKER       IR MISCELLANEOUS PROCEDURE  4/30/2014     OTHER SURGICAL HISTORY      left hand surgery---tendon repair     NM ABLATE  HEART DYSRHYTHM FOCUS  2011    Catheter Ablation Atrial Fibrillation PVI 2011 (Cryo+RF-PVI + roof line + CTI line)     RI ABLATE HEART DYSRHYTHM FOCUS  2011    Re-do PVI 2011 (RFA-PVI + CFE + VIDYA + confirmation of CTI line)     TOTAL SHOULDER REPLACEMENT Right 2016    Dr. Abernathy of [facility]     WRIST SURGERY Left      ZZC MYERS W/O FACETEC FORAMOT/DSKC  VRT SEG, CERVICAL      Laminectomy Lumbar;  Recorded: 2012;     Social History:  Social History     Socioeconomic History     Marital status:      Spouse name: Not on file     Number of children: Not on file     Years of education: Not on file     Highest education level: Not on file   Occupational History     Not on file   Tobacco Use     Smoking status: Former Smoker     Packs/day: 1.00     Years: 4.00     Pack years: 4.00     Types: Cigarettes     Quit date: 1968     Years since quittin.1     Smokeless tobacco: Never Used   Substance and Sexual Activity     Alcohol use: Yes     Alcohol/week: 2.0 standard drinks     Comment: Alcoholic Drinks/day: 1 beer per week     Drug use: No     Sexual activity: Yes     Partners: Female     Birth control/protection: Post-menopausal   Other Topics Concern     Parent/sibling w/ CABG, MI or angioplasty before 65F 55M? Not Asked   Social History Narrative    Preloaded 2013     Social Determinants of Health     Financial Resource Strain: Not on file   Food Insecurity: Not on file   Transportation Needs: Not on file   Physical Activity: Not on file   Stress: Not on file   Social Connections: Not on file   Intimate Partner Violence: Not on file   Housing Stability: Not on file       Review of Systems   ,         Family History:  Family History   Problem Relation Age of Onset     Cancer Mother         leukemia     Cancer Father         bladder     Cancer Sister         breast with lung met.     Aneurysm Sister      CABG Brother      CABG Brother      Valvular heart  "disease Brother         valve replacement         Allergies:  Allergies   Allergen Reactions     Adhesive Tape Other (See Comments)     ADHESIVE TAPE; SKIN IRRITATION; Skin pulled off with foam tape       Amiodarone      ADVERSE REACTION.  Sunlight sensitivity.     Lisinopril        Medications:  Current Outpatient Medications   Medication Sig Dispense Refill     acetaminophen (TYLENOL) 325 MG tablet Take 650 mg by mouth 2 times daily       albuterol (PROAIR HFA/PROVENTIL HFA/VENTOLIN HFA) 108 (90 Base) MCG/ACT inhaler Inhale 2 puffs into the lungs every 4 hours as needed for shortness of breath / dyspnea or wheezing       amoxicillin (AMOXIL) 500 MG tablet Take 500-2,000 mg by mouth once as needed (TAKE 1 HOUR PRIOR TO DENTAL APPOINTMENTS.)        Ascorbic Acid (VITAMIN C) 500 MG CAPS Take 1,000 mg by mouth daily       atorvastatin (LIPITOR) 40 MG tablet Take 40 mg by mouth daily       budesonide-formoterol (SYMBICORT) 160-4.5 MCG/ACT Inhaler Inhale 2 puffs into the lungs 2 times daily       co-enzyme Q-10 100 MG CAPS capsule Take 100 mg by mouth daily        fish oil-omega-3 fatty acids 1000 MG capsule Take 2 g by mouth 2 times daily       FLUoxetine (PROZAC) 20 MG capsule TAKE 1 CAPSULE BY MOUTH EVERY DAY       furosemide (LASIX) 80 MG tablet Take 1 tablet (80 mg) by mouth 2 times daily 90 tablet 6     losartan (COZAAR) 50 MG tablet Take 1 tablet (50 mg) by mouth daily 90 tablet 3     magnesium chloride 535 (64 Mg) MG TBEC CR tablet Take 64 mg by mouth 2 times daily       multivitamin, therapeutic (THERA-VIT) TABS tablet Take 1 tablet by mouth daily       nitroGLYcerin (NITROSTAT) 0.4 MG sublingual tablet One tablet under the tongue every 5 minutes if needed for chest pain. May repeat every 5 minutes for a maximum of 3 doses in 15 minutes\" 25 tablet 3     OXYGEN-HELIUM IN 4-5 L        polyethylene glycol (MIRALAX) 17 GM/Dose powder Take 17 g by mouth daily        sotalol (BETAPACE) 160 MG tablet Take 160 mg by " "mouth 2 times daily       tiotropium (SPIRIVA) 18 MCG inhaled capsule Inhale 18 mcg into the lungs daily       vitamin D2 (ERGOCALCIFEROL) 29908 units (1250 mcg) capsule TAKE 1 CAPSULE BY MOUTH 2 TIMES A WEEK       warfarin ANTICOAGULANT (COUMADIN) 2.5 MG tablet Take 1 to 2 tablets (2.5 - 5 mg) daily by mouth. Adjust dose based on INR results as directed. 130 tablet 1         Surgical site  ICD well-healed to right subclavicular region    Device interrogation    Atrial paced 92%  Ventricular paced 1.8%  Very little atrial fibrillation since cardioversion just burst of atrial tachycardia/flutter  OptiVol satisfactory  Battery good for another 7 months  No ventricular tachycardia    Objective:   Vital signs:  /66 (BP Location: Right arm, Patient Position: Sitting, Cuff Size: Adult Large)   Pulse 66   Resp 16   Ht 1.88 m (6' 2\")   Wt 121.1 kg (267 lb)   BMI 34.28 kg/m    Nasal oxygen 6 L/min  Alert appropriate seems comfortable    Physical Exam:  H    GENERAL APPEARANCE: Alert, cooperative and in no acute distress.  HEENT: No scleral icterus. No Xanthelasma. Oral mucous membranes pink and moist.  NECK: JVP probably normal cm. No Hepatojugular reflux. Thyroid not  Palpable  CHEST: clear to auscultation and percussion  CARDIOVASCULAR: S1, S2 without murmur    Brachial, radial  pulses are intact and symmetric.   No carotid bruits noted.  ABDOMEN: Non tender. BS+. No bruits.  EXTREMITIES: No cyanosis, clubbing or edema.    Lab Results:  LIPIDS:  Lab Results   Component Value Date    CHOL 125 02/15/2022    CHOL 117 09/09/2021    CHOL 116 10/08/2020     Lab Results   Component Value Date    HDL 59 02/15/2022    HDL 54 09/09/2021    HDL 51 10/08/2020     No components found for: LDLCALC  Lab Results   Component Value Date    TRIG 55 02/15/2022    TRIG 43 09/09/2021    TRIG 44 10/08/2020     No components found for: CHOLHDL    BMP:  Lab Results   Component Value Date    BUN 41 (H) 02/15/2022     02/15/2022    " CO2 26 02/15/2022         This note has been dictated using voice recognition software. Any grammatical or context distortions are unintentional and inherent to the software.  Everett Renee MD  [facility]  [phone]

## 2022-02-19 LAB
MDC_IDC_EPISODE_DTM: NORMAL
MDC_IDC_EPISODE_DURATION: 102 S
MDC_IDC_EPISODE_DURATION: 103 S
MDC_IDC_EPISODE_DURATION: 103 S
MDC_IDC_EPISODE_DURATION: 112 S
MDC_IDC_EPISODE_DURATION: 115 S
MDC_IDC_EPISODE_DURATION: 122 S
MDC_IDC_EPISODE_DURATION: 124 S
MDC_IDC_EPISODE_DURATION: 133 S
MDC_IDC_EPISODE_DURATION: 1365 S
MDC_IDC_EPISODE_DURATION: 157 S
MDC_IDC_EPISODE_DURATION: 158 S
MDC_IDC_EPISODE_DURATION: 172 S
MDC_IDC_EPISODE_DURATION: 212 S
MDC_IDC_EPISODE_DURATION: 213 S
MDC_IDC_EPISODE_DURATION: 223 S
MDC_IDC_EPISODE_DURATION: 251 S
MDC_IDC_EPISODE_DURATION: 265 S
MDC_IDC_EPISODE_DURATION: 267 S
MDC_IDC_EPISODE_DURATION: 267 S
MDC_IDC_EPISODE_DURATION: 272 S
MDC_IDC_EPISODE_DURATION: 276 S
MDC_IDC_EPISODE_DURATION: 281 S
MDC_IDC_EPISODE_DURATION: 285 S
MDC_IDC_EPISODE_DURATION: 295 S
MDC_IDC_EPISODE_DURATION: 33 S
MDC_IDC_EPISODE_DURATION: 37 S
MDC_IDC_EPISODE_DURATION: 374 S
MDC_IDC_EPISODE_DURATION: 408 S
MDC_IDC_EPISODE_DURATION: 41 S
MDC_IDC_EPISODE_DURATION: 42 S
MDC_IDC_EPISODE_DURATION: 421 S
MDC_IDC_EPISODE_DURATION: 449 S
MDC_IDC_EPISODE_DURATION: 47 S
MDC_IDC_EPISODE_DURATION: 557 S
MDC_IDC_EPISODE_DURATION: 56 S
MDC_IDC_EPISODE_DURATION: 59 S
MDC_IDC_EPISODE_DURATION: 602 S
MDC_IDC_EPISODE_DURATION: 61 S
MDC_IDC_EPISODE_DURATION: 68 S
MDC_IDC_EPISODE_DURATION: 70 S
MDC_IDC_EPISODE_DURATION: 77 S
MDC_IDC_EPISODE_DURATION: 78 S
MDC_IDC_EPISODE_DURATION: 78 S
MDC_IDC_EPISODE_DURATION: 796 S
MDC_IDC_EPISODE_DURATION: 85 S
MDC_IDC_EPISODE_DURATION: 87 S
MDC_IDC_EPISODE_DURATION: 89 S
MDC_IDC_EPISODE_DURATION: 90 S
MDC_IDC_EPISODE_DURATION: 90 S
MDC_IDC_EPISODE_DURATION: 93 S
MDC_IDC_EPISODE_ID: 1161
MDC_IDC_EPISODE_ID: 1162
MDC_IDC_EPISODE_ID: 1163
MDC_IDC_EPISODE_ID: 1164
MDC_IDC_EPISODE_ID: 1165
MDC_IDC_EPISODE_ID: 1166
MDC_IDC_EPISODE_ID: 1167
MDC_IDC_EPISODE_ID: 1168
MDC_IDC_EPISODE_ID: 1169
MDC_IDC_EPISODE_ID: 1170
MDC_IDC_EPISODE_ID: 1171
MDC_IDC_EPISODE_ID: 1172
MDC_IDC_EPISODE_ID: 1173
MDC_IDC_EPISODE_ID: 1174
MDC_IDC_EPISODE_ID: 1175
MDC_IDC_EPISODE_ID: 1176
MDC_IDC_EPISODE_ID: 1177
MDC_IDC_EPISODE_ID: 1178
MDC_IDC_EPISODE_ID: 1179
MDC_IDC_EPISODE_ID: 1180
MDC_IDC_EPISODE_ID: 1181
MDC_IDC_EPISODE_ID: 1182
MDC_IDC_EPISODE_ID: 1183
MDC_IDC_EPISODE_ID: 1184
MDC_IDC_EPISODE_ID: 1185
MDC_IDC_EPISODE_ID: 1186
MDC_IDC_EPISODE_ID: 1187
MDC_IDC_EPISODE_ID: 1188
MDC_IDC_EPISODE_ID: 1189
MDC_IDC_EPISODE_ID: 1190
MDC_IDC_EPISODE_ID: 1191
MDC_IDC_EPISODE_ID: 1192
MDC_IDC_EPISODE_ID: 1193
MDC_IDC_EPISODE_ID: 1194
MDC_IDC_EPISODE_ID: 1195
MDC_IDC_EPISODE_ID: 1196
MDC_IDC_EPISODE_ID: 1197
MDC_IDC_EPISODE_ID: 1198
MDC_IDC_EPISODE_ID: 1199
MDC_IDC_EPISODE_ID: 1200
MDC_IDC_EPISODE_ID: 1201
MDC_IDC_EPISODE_ID: 1202
MDC_IDC_EPISODE_ID: 1203
MDC_IDC_EPISODE_ID: 1204
MDC_IDC_EPISODE_ID: 1205
MDC_IDC_EPISODE_ID: 1206
MDC_IDC_EPISODE_ID: 1207
MDC_IDC_EPISODE_ID: 1208
MDC_IDC_EPISODE_ID: 1209
MDC_IDC_EPISODE_ID: 1210
MDC_IDC_EPISODE_TYPE: NORMAL
MDC_IDC_LEAD_IMPLANT_DT: NORMAL
MDC_IDC_LEAD_IMPLANT_DT: NORMAL
MDC_IDC_LEAD_LOCATION: NORMAL
MDC_IDC_LEAD_LOCATION: NORMAL
MDC_IDC_LEAD_LOCATION_DETAIL_1: NORMAL
MDC_IDC_LEAD_LOCATION_DETAIL_1: NORMAL
MDC_IDC_LEAD_MFG: NORMAL
MDC_IDC_LEAD_MFG: NORMAL
MDC_IDC_LEAD_MODEL: NORMAL
MDC_IDC_LEAD_MODEL: NORMAL
MDC_IDC_LEAD_POLARITY_TYPE: NORMAL
MDC_IDC_LEAD_POLARITY_TYPE: NORMAL
MDC_IDC_LEAD_SERIAL: NORMAL
MDC_IDC_LEAD_SERIAL: NORMAL
MDC_IDC_MSMT_BATTERY_DTM: NORMAL
MDC_IDC_MSMT_BATTERY_REMAINING_LONGEVITY: 7 MO
MDC_IDC_MSMT_BATTERY_RRT_TRIGGER: 2.73
MDC_IDC_MSMT_BATTERY_STATUS: NORMAL
MDC_IDC_MSMT_BATTERY_VOLTAGE: 2.85 V
MDC_IDC_MSMT_CAP_CHARGE_DTM: NORMAL
MDC_IDC_MSMT_CAP_CHARGE_ENERGY: 18 J
MDC_IDC_MSMT_CAP_CHARGE_TIME: 4.71
MDC_IDC_MSMT_CAP_CHARGE_TYPE: NORMAL
MDC_IDC_MSMT_LEADCHNL_RA_IMPEDANCE_VALUE: 361 OHM
MDC_IDC_MSMT_LEADCHNL_RA_PACING_THRESHOLD_AMPLITUDE: 1.12 V
MDC_IDC_MSMT_LEADCHNL_RA_PACING_THRESHOLD_AMPLITUDE: 1.25 V
MDC_IDC_MSMT_LEADCHNL_RA_PACING_THRESHOLD_PULSEWIDTH: 0.4 MS
MDC_IDC_MSMT_LEADCHNL_RA_PACING_THRESHOLD_PULSEWIDTH: 0.4 MS
MDC_IDC_MSMT_LEADCHNL_RA_SENSING_INTR_AMPL: 0.12 MV
MDC_IDC_MSMT_LEADCHNL_RA_SENSING_INTR_AMPL: 0.9 MV
MDC_IDC_MSMT_LEADCHNL_RV_IMPEDANCE_VALUE: 342 OHM
MDC_IDC_MSMT_LEADCHNL_RV_IMPEDANCE_VALUE: 418 OHM
MDC_IDC_MSMT_LEADCHNL_RV_PACING_THRESHOLD_AMPLITUDE: 0.5 V
MDC_IDC_MSMT_LEADCHNL_RV_PACING_THRESHOLD_AMPLITUDE: 0.75 V
MDC_IDC_MSMT_LEADCHNL_RV_PACING_THRESHOLD_PULSEWIDTH: 0.4 MS
MDC_IDC_MSMT_LEADCHNL_RV_PACING_THRESHOLD_PULSEWIDTH: 0.4 MS
MDC_IDC_MSMT_LEADCHNL_RV_SENSING_INTR_AMPL: 10.12 MV
MDC_IDC_MSMT_LEADCHNL_RV_SENSING_INTR_AMPL: 11.8 MV
MDC_IDC_PG_IMPLANT_DTM: NORMAL
MDC_IDC_PG_MFG: NORMAL
MDC_IDC_PG_MODEL: NORMAL
MDC_IDC_PG_SERIAL: NORMAL
MDC_IDC_PG_TYPE: NORMAL
MDC_IDC_SESS_CLINIC_NAME: NORMAL
MDC_IDC_SESS_DTM: NORMAL
MDC_IDC_SESS_TYPE: NORMAL
MDC_IDC_SET_BRADY_AT_MODE_SWITCH_RATE: 171 {BEATS}/MIN
MDC_IDC_SET_BRADY_HYSTRATE: NORMAL
MDC_IDC_SET_BRADY_LOWRATE: 60 {BEATS}/MIN
MDC_IDC_SET_BRADY_MAX_SENSOR_RATE: 120 {BEATS}/MIN
MDC_IDC_SET_BRADY_MAX_TRACKING_RATE: 130 {BEATS}/MIN
MDC_IDC_SET_BRADY_MODE: NORMAL
MDC_IDC_SET_BRADY_PAV_DELAY_LOW: 180 MS
MDC_IDC_SET_BRADY_SAV_DELAY_LOW: 150 MS
MDC_IDC_SET_LEADCHNL_RA_PACING_AMPLITUDE: NORMAL
MDC_IDC_SET_LEADCHNL_RA_PACING_ANODE_ELECTRODE_1: NORMAL
MDC_IDC_SET_LEADCHNL_RA_PACING_ANODE_LOCATION_1: NORMAL
MDC_IDC_SET_LEADCHNL_RA_PACING_CAPTURE_MODE: NORMAL
MDC_IDC_SET_LEADCHNL_RA_PACING_CATHODE_ELECTRODE_1: NORMAL
MDC_IDC_SET_LEADCHNL_RA_PACING_CATHODE_LOCATION_1: NORMAL
MDC_IDC_SET_LEADCHNL_RA_PACING_POLARITY: NORMAL
MDC_IDC_SET_LEADCHNL_RA_PACING_PULSEWIDTH: 0.4 MS
MDC_IDC_SET_LEADCHNL_RA_SENSING_ANODE_ELECTRODE_1: NORMAL
MDC_IDC_SET_LEADCHNL_RA_SENSING_ANODE_LOCATION_1: NORMAL
MDC_IDC_SET_LEADCHNL_RA_SENSING_CATHODE_ELECTRODE_1: NORMAL
MDC_IDC_SET_LEADCHNL_RA_SENSING_CATHODE_LOCATION_1: NORMAL
MDC_IDC_SET_LEADCHNL_RA_SENSING_POLARITY: NORMAL
MDC_IDC_SET_LEADCHNL_RA_SENSING_SENSITIVITY: 0.15 MV
MDC_IDC_SET_LEADCHNL_RV_PACING_AMPLITUDE: NORMAL
MDC_IDC_SET_LEADCHNL_RV_PACING_ANODE_ELECTRODE_1: NORMAL
MDC_IDC_SET_LEADCHNL_RV_PACING_ANODE_LOCATION_1: NORMAL
MDC_IDC_SET_LEADCHNL_RV_PACING_CAPTURE_MODE: NORMAL
MDC_IDC_SET_LEADCHNL_RV_PACING_CATHODE_ELECTRODE_1: NORMAL
MDC_IDC_SET_LEADCHNL_RV_PACING_CATHODE_LOCATION_1: NORMAL
MDC_IDC_SET_LEADCHNL_RV_PACING_POLARITY: NORMAL
MDC_IDC_SET_LEADCHNL_RV_PACING_PULSEWIDTH: 0.4 MS
MDC_IDC_SET_LEADCHNL_RV_SENSING_ANODE_ELECTRODE_1: NORMAL
MDC_IDC_SET_LEADCHNL_RV_SENSING_ANODE_LOCATION_1: NORMAL
MDC_IDC_SET_LEADCHNL_RV_SENSING_CATHODE_ELECTRODE_1: NORMAL
MDC_IDC_SET_LEADCHNL_RV_SENSING_CATHODE_LOCATION_1: NORMAL
MDC_IDC_SET_LEADCHNL_RV_SENSING_POLARITY: NORMAL
MDC_IDC_SET_LEADCHNL_RV_SENSING_SENSITIVITY: 0.45 MV
MDC_IDC_SET_ZONE_DETECTION_BEATS_DENOMINATOR: 32 {BEATS}
MDC_IDC_SET_ZONE_DETECTION_BEATS_NUMERATOR: 24 {BEATS}
MDC_IDC_SET_ZONE_DETECTION_INTERVAL: 240 MS
MDC_IDC_SET_ZONE_DETECTION_INTERVAL: 320 MS
MDC_IDC_SET_ZONE_DETECTION_INTERVAL: 350 MS
MDC_IDC_SET_ZONE_DETECTION_INTERVAL: 360 MS
MDC_IDC_SET_ZONE_DETECTION_INTERVAL: 360 MS
MDC_IDC_SET_ZONE_TYPE: NORMAL
MDC_IDC_STAT_AT_BURDEN_PERCENT: 0.6 %
MDC_IDC_STAT_AT_DTM_END: NORMAL
MDC_IDC_STAT_AT_DTM_START: NORMAL
MDC_IDC_STAT_AT_MODE_SW_COUNT: 108
MDC_IDC_STAT_BRADY_AP_VP_PERCENT: 1.37 %
MDC_IDC_STAT_BRADY_AP_VS_PERCENT: 90.64 %
MDC_IDC_STAT_BRADY_AS_VP_PERCENT: 0.35 %
MDC_IDC_STAT_BRADY_AS_VS_PERCENT: 7.64 %
MDC_IDC_STAT_BRADY_DTM_END: NORMAL
MDC_IDC_STAT_BRADY_DTM_START: NORMAL
MDC_IDC_STAT_BRADY_RA_PERCENT_PACED: 92 %
MDC_IDC_STAT_BRADY_RV_PERCENT_PACED: 1.8 %
MDC_IDC_STAT_EPISODE_RECENT_COUNT: 0
MDC_IDC_STAT_EPISODE_RECENT_COUNT: 108
MDC_IDC_STAT_EPISODE_RECENT_COUNT_DTM_END: NORMAL
MDC_IDC_STAT_EPISODE_RECENT_COUNT_DTM_START: NORMAL
MDC_IDC_STAT_EPISODE_TOTAL_COUNT: 0
MDC_IDC_STAT_EPISODE_TOTAL_COUNT: 1
MDC_IDC_STAT_EPISODE_TOTAL_COUNT: 2180
MDC_IDC_STAT_EPISODE_TOTAL_COUNT: 6
MDC_IDC_STAT_EPISODE_TOTAL_COUNT_DTM_END: NORMAL
MDC_IDC_STAT_EPISODE_TOTAL_COUNT_DTM_START: NORMAL
MDC_IDC_STAT_EPISODE_TYPE: NORMAL
MDC_IDC_STAT_TACHYTHERAPY_ATP_DELIVERED_RECENT: 0
MDC_IDC_STAT_TACHYTHERAPY_ATP_DELIVERED_TOTAL: 0
MDC_IDC_STAT_TACHYTHERAPY_RECENT_DTM_END: NORMAL
MDC_IDC_STAT_TACHYTHERAPY_RECENT_DTM_START: NORMAL
MDC_IDC_STAT_TACHYTHERAPY_SHOCKS_ABORTED_RECENT: 0
MDC_IDC_STAT_TACHYTHERAPY_SHOCKS_ABORTED_TOTAL: 0
MDC_IDC_STAT_TACHYTHERAPY_SHOCKS_DELIVERED_RECENT: 0
MDC_IDC_STAT_TACHYTHERAPY_SHOCKS_DELIVERED_TOTAL: 2
MDC_IDC_STAT_TACHYTHERAPY_TOTAL_DTM_END: NORMAL
MDC_IDC_STAT_TACHYTHERAPY_TOTAL_DTM_START: NORMAL

## 2022-02-21 ENCOUNTER — HOSPITAL ENCOUNTER (OUTPATIENT)
Dept: NUCLEAR MEDICINE | Facility: HOSPITAL | Age: 77
End: 2022-02-21
Attending: INTERNAL MEDICINE
Payer: OTHER MISCELLANEOUS

## 2022-02-21 ENCOUNTER — HOSPITAL ENCOUNTER (OUTPATIENT)
Dept: RADIOLOGY | Facility: HOSPITAL | Age: 77
End: 2022-02-21
Attending: INTERNAL MEDICINE
Payer: OTHER MISCELLANEOUS

## 2022-02-21 DIAGNOSIS — I27.21 PAH (PULMONARY ARTERY HYPERTENSION) (H): ICD-10-CM

## 2022-02-21 DIAGNOSIS — R06.09 DOE (DYSPNEA ON EXERTION): ICD-10-CM

## 2022-02-21 DIAGNOSIS — Z11.59 ENCOUNTER FOR SCREENING FOR OTHER VIRAL DISEASES: ICD-10-CM

## 2022-02-21 DIAGNOSIS — I50.812 CHRONIC RIGHT-SIDED HEART FAILURE (H): ICD-10-CM

## 2022-02-21 PROCEDURE — 343N000001 HC RX 343: Performed by: INTERNAL MEDICINE

## 2022-02-21 PROCEDURE — A9540 TC99M MAA: HCPCS | Performed by: INTERNAL MEDICINE

## 2022-02-21 PROCEDURE — 71046 X-RAY EXAM CHEST 2 VIEWS: CPT

## 2022-02-21 PROCEDURE — 78580 LUNG PERFUSION IMAGING: CPT

## 2022-02-21 RX ADMIN — KIT FOR THE PREPARATION OF TECHNETIUM TC 99M ALBUMIN AGGREGATED 8.4 MILLICURIE: 2.5 INJECTION, POWDER, FOR SOLUTION INTRAVENOUS at 10:45

## 2022-02-22 ENCOUNTER — ANTICOAGULATION THERAPY VISIT (OUTPATIENT)
Dept: ANTICOAGULATION | Facility: CLINIC | Age: 77
End: 2022-02-22
Payer: COMMERCIAL

## 2022-02-22 DIAGNOSIS — I48.91 ATRIAL FIBRILLATION (H): Primary | ICD-10-CM

## 2022-02-22 DIAGNOSIS — I48.19 PERSISTENT ATRIAL FIBRILLATION (H): ICD-10-CM

## 2022-02-22 LAB — INR HOME MONITORING: 3.5 (ref 2–3)

## 2022-02-22 NOTE — PROGRESS NOTES
ANTICOAGULATION MANAGEMENT     Buck Sinclair 77 year old male is on warfarin with supratherapeutic INR result. (Goal INR 2.0-3.0)    Recent labs: (last 7 days)     02/22/22  0000   INR 3.50*       ASSESSMENT     Source(s): Chart Review and Patient/Caregiver Call     Warfarin doses taken: Warfarin taken as instructed  Diet: It was patient birthday last week and had a drink which he normally does.   New illness, injury, or hospitalization: No  Medication/supplement changes: None noted  Signs or symptoms of bleeding or clotting: No  Previous INR: Therapeutic last 2(+) visits  Additional findings: None     PLAN     Recommended plan for temporary change(s) affecting INR     Dosing Instructions: Hold dose then continue your current warfarin dose with next INR in 1 week       Summary  As of 2/22/2022    Full warfarin instructions:  2/22: Hold; Otherwise 5 mg every Mon, Thu; 2.5 mg all other days   Next INR check:  3/1/2022             Telephone call with  Neela who verbalizes understanding and agrees to plan    Patient to recheck with home meter    Education provided: Goal range and significance of current result, Importance of therapeutic range and Importance of following up at instructed interval    Plan made per [facility] anticoagulation protocol    Michael Holliday RN  Anticoagulation Clinic  2/22/2022    _______________________________________________________________________     Anticoagulation Episode Summary     Current INR goal:  2.0-3.0   TTR:  88.7 % (1 y)   Target end date:  Indefinite   Send INR reminders to:  LARISA RICHARDS    Indications    Persistent Atrial Fibrillation [I48.91]  Persistent atrial fibrillation (H) [I48.19]           Comments:  Home monitor ( [employer] )managed by exception         Anticoagulation Care Providers     Provider Role Specialty Phone number    Erica Hansen APRN CNP Referring Cardiovascular Disease [phone]    Everett Renee MD  Cardiovascular Disease [phone]

## 2022-02-24 DIAGNOSIS — Z00.6 EXAMINATION OF PARTICIPANT OR CONTROL IN CLINICAL RESEARCH: Primary | ICD-10-CM

## 2022-02-28 NOTE — PROGRESS NOTES
Service Date: 2022    Hugh Payton MD  Pulmonary  [facility]  [address]  [place] [address]    RE:  Buck Sinclair   MRN:  [id number]  :  1945      Dear Dr. Payton:     We had the pleasure of seeing Buck Sinclair at the [facility]. As you know, Mr. Sinclair is a very pleasant 77 year old male with Group 3 pulmonary hypertension due to chronic obstructive pulmonary disease who presents for follow-up. His other past medical history includes:    1.  Chronic hypoxemic respiratory failure  2.  Minimal smoking history, currently in remission  3.  Coronary artery disease status post PCI x3 to the proximal third to distal left anterior descending artery on 2017  4.  Nonischemic cardiomyopathy with LVEF improved to 50%  5.  Persistent atrial fibrillation status post multiple cardioversions (>20), most recently on 2021. He underwent complex catheter ablation x2 in .  6.  Medtronic dual-chamber implantable cardiac defibrillator placement on 2014 for primary prevention of sudden cardiac death  7.  Benign essential hypertension  8.  Dyslipidemia  9.  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  Since his last clinic visit, he had a lung perfusion scan that showed no segmental perfusion defects and a 6-minute walk distance where he ambulated 232 m (decreased from 320 m in 2019) with the lowest oxygen saturation at 81% on 6 L/min of supplemental oxygen.  He has not had any worsening in his breathing, any chest pain, or any lower extremity edema since his last clinic visit.  He continues to have lightheadedness that has not increased in frequency.  No syncopal episodes.  No hospitalizations or ED visits in the interim.      PAST MEDICAL HISTORY:  Past Medical History:   Diagnosis Date     Anemia      Asthma without status asthmaticus 2021     BPH (benign  prostatic hyperplasia)      Cardiomyopathy (H)      COPD, group B, by GOLD 2017 classification (H)      Coronary artery disease due to calcified coronary lesion      Dyslipidemia, goal LDL below 70      Essential hypertension      History of transfusion      Persistent atrial fibrillation (H)      Pneumonia of left lower lobe due to infectious organism 10/4/2017     Skin cancer of trunk      Status post catheter ablation of atrial fibrillation 6/7/2017    PVI 4-2011 (Cryo/PVI + roof line + CTI line) Re-do PVI 7-2011 (RFA/PVI + CFE + VIDYA + confirmed CTI line)     Ventricular tachycardia (H)        PAST SURGICAL HISTORY:  Past Surgical History:   Procedure Laterality Date     CARDIAC DEFIBRILLATOR PLACEMENT       CARDIOVERSION  07/11/2018     CARDIOVERSION  07/11/2018     CARDIOVERSION  11/19/2021     COLONOSCOPY N/A 4/28/2017     CV CORONARY ANGIOGRAM N/A 9/20/2017     CV CORONARY ANGIOGRAM N/A 1/24/2022     CV LEFT HEART CATH N/A 1/24/2022     CV RIGHT AND LEFT HEART CATH N/A 1/24/2022     EP ICD INSERT       FRACTURE SURGERY Left      INGUINAL HERNIA REPAIR Left 1967     INSERT / REPLACE / REMOVE PACEMAKER       IR MISCELLANEOUS PROCEDURE  4/30/2014     OTHER SURGICAL HISTORY       ID ABLATE HEART DYSRHYTHM FOCUS  04/2011     ID ABLATE HEART DYSRHYTHM FOCUS  07/2011     TOTAL SHOULDER REPLACEMENT Right 03/03/2016     WRIST SURGERY Left      ZZC MYERS W/O FACETEC FORAMOT/DSKC 1/2 VRT SEG, CERVICAL         FAMILY HISTORY:  No family history of pulmonary hypertension or rheumatologic disease.  Family History   Problem Relation Age of Onset     Cancer Mother         leukemia     Cancer Father         bladder     Cancer Sister         breast with lung met.     Aneurysm Sister      CABG Brother      CABG Brother      Valvular heart disease Brother         valve replacement       SOCIAL HISTORY:  Social History     Socioeconomic History     Marital status:      Spouse name: Not on file     Number of children: Not on  file     Years of education: Not on file     Highest education level: Not on file   Occupational History     Not on file   Tobacco Use     Smoking status: Former Smoker     Packs/day: 1.00     Years: 4.00     Pack years: 4.00     Types: Cigarettes     Quit date: 1968     Years since quittin.2     Smokeless tobacco: Never Used   Substance and Sexual Activity     Alcohol use: Yes     Alcohol/week: 2.0 standard drinks     Comment: Alcoholic Drinks/day: 1 beer per week     Drug use: No     Sexual activity: Yes     Partners: Female     Birth control/protection: Post-menopausal   Other Topics Concern     Parent/sibling w/ CABG, MI or angioplasty before 65F 55M? Not Asked   Social History Narrative    Preloaded 2013     Social Determinants of Health     Financial Resource Strain: Not on file   Food Insecurity: Not on file   Transportation Needs: Not on file   Physical Activity: Not on file   Stress: Not on file   Social Connections: Not on file   Intimate Partner Violence: Not on file   Housing Stability: Not on file       CURRENT MEDICATIONS:  Current Outpatient Medications   Medication Sig     acetaminophen (TYLENOL) 325 MG tablet Take 650 mg by mouth 2 times daily     albuterol (PROAIR HFA/PROVENTIL HFA/VENTOLIN HFA) 108 (90 Base) MCG/ACT inhaler Inhale 2 puffs into the lungs every 4 hours as needed for shortness of breath / dyspnea or wheezing     Ascorbic Acid (VITAMIN C) 500 MG CAPS Take 1,000 mg by mouth daily     atorvastatin (LIPITOR) 40 MG tablet Take 40 mg by mouth daily     budesonide-formoterol (SYMBICORT) 160-4.5 MCG/ACT Inhaler Inhale 2 puffs into the lungs 2 times daily     co-enzyme Q-10 100 MG CAPS capsule Take 100 mg by mouth daily      fish oil-omega-3 fatty acids 1000 MG capsule Take 2 g by mouth 2 times daily     FLUoxetine (PROZAC) 20 MG capsule TAKE 1 CAPSULE BY MOUTH EVERY DAY     furosemide (LASIX) 80 MG tablet Take 1 tablet (80 mg) by mouth 2 times daily     losartan (COZAAR) 50 MG  "tablet Take 1 tablet (50 mg) by mouth daily     magnesium chloride 535 (64 Mg) MG TBEC CR tablet Take 64 mg by mouth 2 times daily     multivitamin, therapeutic (THERA-VIT) TABS tablet Take 1 tablet by mouth daily     nitroGLYcerin (NITROSTAT) 0.4 MG sublingual tablet One tablet under the tongue every 5 minutes if needed for chest pain. May repeat every 5 minutes for a maximum of 3 doses in 15 minutes\"     OXYGEN-HELIUM IN 4-5 L      polyethylene glycol (MIRALAX) 17 GM/Dose powder Take 17 g by mouth daily      sotalol (BETAPACE) 160 MG tablet Take 160 mg by mouth 2 times daily     tiotropium (SPIRIVA) 18 MCG inhaled capsule Inhale 18 mcg into the lungs daily     vitamin D2 (ERGOCALCIFEROL) 76984 units (1250 mcg) capsule TAKE 1 CAPSULE BY MOUTH 2 TIMES A WEEK     warfarin ANTICOAGULANT (COUMADIN) 2.5 MG tablet Take 1 to 2 tablets (2.5 - 5 mg) daily by mouth. Adjust dose based on INR results as directed.     No current facility-administered medications for this visit.       ROS:   10 point ROS negative except as discussed in above HPI.    EXAM:  /80 (BP Location: Right arm, Patient Position: Sitting, Cuff Size: Adult Large)   Pulse 76   Ht 1.9 m (6' 2.8\")   Wt 122.2 kg (269 lb 6.4 oz)   SpO2 95%   BMI 33.85 kg/m    General: appears comfortable, alert and articulate  Head: normocephalic, atraumatic  Eyes: anicteric sclera, EOMI  Heart: regular, normal S1, loud P2, 2/6 systolic ejection murmur at LLSB, no gallop or rub, estimated JVP 12 cm, 1+ radial pulses  Lungs: clear to auscultation bilaterally, no rales or wheezing  Abdomen: soft, non-tender, bowel sounds present, no hepatosplenomegaly  Extremities: no clubbing, cyanosis or edema  Neurological: normal speech and affect, no gross motor deficits      Echocardiogram 12/8/21:  1. Left ventricular systolic function is normal. The left ventricle is normal  in size. There is normal left ventricular wall thickness.Left ventricular  diastolic function is normal. " No regional wall motion abnormalities noted.  2. The right ventricle is mildly dilated. Mildly decreased right ventricular  systolic function.  3. There is mild to moderate (1-2+) tricuspid regurgitation.  4. The right ventricular systolic pressure is approximated at 45.7mmHg plus  the right atrial pressure.Right ventricular systolic pressure is elevated,  consistent with moderate pulmonary hypertension. Normal RA pressure of 3 mmHg.  6. The ascending aorta is mildly dilated at 42 mm.     RHC and coronary angiogram 1/24/22:  RA: 10  RV: 55/14  PA: 66/24 (37)  PCWP: 14  TD CO: 5.7  PVR: 4.0     Mild coronary artery disease with patent LAD stents.     PFTs 11/10/20:  FVC: 3.69 L (76%)  FEV1: 2.42 L (68%)  FEV1/FVC: 0.66  DLCO corrected: 46%  No improvement with bronchodilator     CT chest without contrast 7/15/19:   1.  Nodular opacity of the left lower lobe of interest on 04/17/2019 is less conspicuous today and probably explained by scarring. Additional 6 month chest CT follow-up is recommended.  2.  Emphysema and scattered areas of scarring elsewhere in both lungs.  3.  Advanced multivessel coronary artery disease.     CT PE 10/18/17:  1.  No evidence of pulmonary embolus.  2.  New mild pneumonia superior segment of the left lower lobe.  3.  Previously seen cavitary nodule in the left lower lobe has resolved.  4.  Severe coronary artery calcification.    NM Lung Perfusion Scan 2/21/22:  FINDINGS: Normal perfusion to both lungs. No segmental perfusion defects.     6MWT 7/17/19: Ambulated 320 meters, lowest saturation 87% on room air    6MWT 2/15/22: Ambulated 232 meters (reduced from 320 meters in 7/2019), lowest saturation 81% on 6 L/min O2      Assessment and Plan:     Buck Sinclair is a very pleasant 77 year old male with Group 3 pulmonary hypertension who presents for follow-up.    Group 3 PH due to COPD: He is WHO functional class III. We will enroll him in the PERFECT trial with inhaled Tyvaso today. We  will discuss with Dr. Payton (Pulmonary) whether to increase his supplemental oxygen use since his oxygen saturation decreased to 81% on 6 L/min oxygen on his most recent 6MWT. A sleep study has been ordered. We will order a CT chest without contrast for the PERFECT trial.    Follow-up determined by PERFECT trial.      It was a pleasure seeing Buck Sinclair at the [facility]. Please contact us with any questions or concerns that you may have.      Patient was seen and discussed with staff attending, Dr. Morales.      Sincerely,      Michelle Villanueva MD, PhD  Cardiology Fellow    And    Jose Morales MD, PhD   of Medicine  [facility]  Cardiovascular Division  [facility]    Staff Attestation:   I have seen and examined this patient on March 1, 2022. I have discussed with the team and agree with the findings and plan in this note. I have personally reviewed vital signs, hemodynamics, medications, laboratory values, and diagnostic testing.     Group 3 PH. We will enroll in PERFECT trial.    Jose Morales MD, PhD  Cardiovascular Division  [facility]     I spent a total of 45 minutes on the date of service evaluating this patient which included face to face discussion, performing a physical exam, reviewing of the chart to gain information from other providers to obtain further history, evaluated his hemodynamics showing pre-capillary PH due to his COPD, his 6MWT showing hypoxemia with exertion, ordering tests and/or medications, and documenting clinical information in the electronic health record.

## 2022-03-01 ENCOUNTER — ANTICOAGULATION THERAPY VISIT (OUTPATIENT)
Dept: ANTICOAGULATION | Facility: CLINIC | Age: 77
End: 2022-03-01

## 2022-03-01 ENCOUNTER — DOCUMENTATION ONLY (OUTPATIENT)
Dept: CARDIOLOGY | Facility: CLINIC | Age: 77
End: 2022-03-01

## 2022-03-01 ENCOUNTER — OFFICE VISIT (OUTPATIENT)
Dept: CARDIOLOGY | Facility: CLINIC | Age: 77
End: 2022-03-01
Payer: OTHER MISCELLANEOUS

## 2022-03-01 ENCOUNTER — ANCILLARY PROCEDURE (OUTPATIENT)
Dept: CT IMAGING | Facility: CLINIC | Age: 77
End: 2022-03-01
Attending: INTERNAL MEDICINE
Payer: OTHER MISCELLANEOUS

## 2022-03-01 VITALS
HEART RATE: 76 BPM | SYSTOLIC BLOOD PRESSURE: 123 MMHG | HEIGHT: 75 IN | DIASTOLIC BLOOD PRESSURE: 80 MMHG | BODY MASS INDEX: 33.5 KG/M2 | OXYGEN SATURATION: 95 % | WEIGHT: 269.4 LBS

## 2022-03-01 DIAGNOSIS — Z00.6 EXAMINATION OF PARTICIPANT OR CONTROL IN CLINICAL RESEARCH: ICD-10-CM

## 2022-03-01 DIAGNOSIS — R06.09 DOE (DYSPNEA ON EXERTION): ICD-10-CM

## 2022-03-01 DIAGNOSIS — Z11.59 ENCOUNTER FOR SCREENING FOR OTHER VIRAL DISEASES: ICD-10-CM

## 2022-03-01 DIAGNOSIS — I50.812 CHRONIC RIGHT-SIDED HEART FAILURE (H): ICD-10-CM

## 2022-03-01 DIAGNOSIS — I27.21 PAH (PULMONARY ARTERY HYPERTENSION) (H): ICD-10-CM

## 2022-03-01 DIAGNOSIS — I48.19 PERSISTENT ATRIAL FIBRILLATION (H): Primary | ICD-10-CM

## 2022-03-01 LAB — INR HOME MONITORING: 2.7 (ref 2–3)

## 2022-03-01 PROCEDURE — G0463 HOSPITAL OUTPT CLINIC VISIT: HCPCS

## 2022-03-01 PROCEDURE — 94726 PLETHYSMOGRAPHY LUNG VOLUMES: CPT | Performed by: INTERNAL MEDICINE

## 2022-03-01 PROCEDURE — 99215 OFFICE O/P EST HI 40 MIN: CPT | Performed by: INTERNAL MEDICINE

## 2022-03-01 PROCEDURE — 94729 DIFFUSING CAPACITY: CPT | Performed by: INTERNAL MEDICINE

## 2022-03-01 PROCEDURE — 94375 RESPIRATORY FLOW VOLUME LOOP: CPT | Performed by: INTERNAL MEDICINE

## 2022-03-01 PROCEDURE — 71250 CT THORAX DX C-: CPT | Mod: GC | Performed by: RADIOLOGY

## 2022-03-01 ASSESSMENT — PAIN SCALES - GENERAL: PAINLEVEL: NO PAIN (0)

## 2022-03-01 NOTE — PATIENT INSTRUCTIONS
Medication Changes:   -No changes, we will enroll you in the PERFECT trial.     Patient Instructions:  1. Continue staying active and eat a heart healthy diet.    2. Please keep current list of medications with you at all times.    3. Remember to weigh yourself daily after voiding and before you consume any food or beverages and log the numbers.  If you have gained 2 pounds overnight or 5 pounds in a week contact us immediately for medication adjustments or further instructions.    4. **Please call us immediately if you have any syncope (fainting or passing out), chest pain, edema (swelling or weight gain), or decline in your functional status (general decline in how you are feeling).    5. Patients on Remodulin (treprostinil) or Veletri (epoprostenol): Please make sure that you have your backup pump and supplies with you at all times, your mixing instructions, and contact information for your specialty pharmacy.    Follow up Appointment Information:  Follow up with research    Results:  Results for ALEJO BROWN (MRN [id number]) as of 3/1/2022 09:05   Ref. Range 2/22/2022 00:00   INR HOME MONITORING Latest Ref Range: 2.000 - 3.000  3.50 (H)     We are located on the third floor of the [facility] ([facility] on the [facility].  Our address is     [address] [place] [address] [place] [address] [place] [address]      Thank you for allowing us to be a part of your care here at the [facility]    If you have questions or concerns please contact us at:    Anabell Lemus, RN, BSN PHN  Rebeka Oviedo (Schedule,Prior Auth)  Nurse Coordinator     Clinic   Pulmonary Hypertension   Pulmonary Hypertension  [facility]  (Phone)[phone]   (Phone) [phone]        (Fax)[phone]                ** Please note that you will NOT receive a reminder call regarding your scheduled  testing, reminder calls are for provider appointments only.  If you are scheduled for testing within the [facility] system you may receive a call regarding pre-registration for billing purposes only.**     Support Group:  Pulmonary Hypertension Association  Https://www.phassociation.org/  **Look at the Events Tab** They even have Support Groups that you can call into    [facility] PH Support Group  Second Saturday of the Month from 1-3 PM   Location: [address] [place] [id number]  Leader: Ritu Chavez  Phone: [phone] or [phone]  Email: [email or web address]

## 2022-03-01 NOTE — NURSING NOTE
Chief Complaint   Patient presents with     Follow Up     Return for PH F/U to review testing   Vitals were taken and medications reconciled.    Stephen Harris, EMT  7:55 AM

## 2022-03-01 NOTE — LETTER
3/1/2022      RE: Buck Sinclair  [address] [place] [address] [address]  [place] [address]       Dear Colleague,    Thank you for the opportunity to participate in the care of your patient, Buck Sinclair, at the [facility] [place] [facility]. Please see a copy of my visit note below.    Service Date: 2022    Hugh Payton MD  Pulmonary  [facility]  [address]  [place] [address]    RE:  Buck Sinclair   MRN:  [id number]  :  1945      Dear Dr. Payton:     We had the pleasure of seeing Buck Sinclair at the [facility]. As you know, Mr. Sinclair is a very pleasant 77 year old male with Group 3 pulmonary hypertension due to chronic obstructive pulmonary disease who presents for follow-up. His other past medical history includes:    1.  Chronic hypoxemic respiratory failure  2.  Minimal smoking history, currently in remission  3.  Coronary artery disease status post PCI x3 to the proximal third to distal left anterior descending artery on 2017  4.  Nonischemic cardiomyopathy with LVEF improved to 50%  5.  Persistent atrial fibrillation status post multiple cardioversions (>20), most recently on 2021. He underwent complex catheter ablation x2 in .  6.  Medtronic dual-chamber implantable cardiac defibrillator placement on 2014 for primary prevention of sudden cardiac death  7.  Benign essential hypertension  8.  Dyslipidemia  9.  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  Since his last clinic visit, he had a lung perfusion scan that showed no segmental perfusion defects and a 6-minute walk distance where he ambulated 232 m (decreased from 320 m in 2019) with the lowest oxygen saturation at 81% on 6 L/min of supplemental oxygen.  He has not had any worsening in his breathing, any chest pain, or any  lower extremity edema since his last clinic visit.  He continues to have lightheadedness that has not increased in frequency.  No syncopal episodes.  No hospitalizations or ED visits in the interim.      PAST MEDICAL HISTORY:  Past Medical History:   Diagnosis Date     Anemia      Asthma without status asthmaticus 5/5/2021     BPH (benign prostatic hyperplasia)      Cardiomyopathy (H)      COPD, group B, by GOLD 2017 classification (H)      Coronary artery disease due to calcified coronary lesion      Dyslipidemia, goal LDL below 70      Essential hypertension      History of transfusion      Persistent atrial fibrillation (H)      Pneumonia of left lower lobe due to infectious organism 10/4/2017     Skin cancer of trunk      Status post catheter ablation of atrial fibrillation 6/7/2017    PVI 4-2011 (Cryo/PVI + roof line + CTI line) Re-do PVI 7-2011 (RFA/PVI + CFE + VIDYA + confirmed CTI line)     Ventricular tachycardia (H)        PAST SURGICAL HISTORY:  Past Surgical History:   Procedure Laterality Date     CARDIAC DEFIBRILLATOR PLACEMENT       CARDIOVERSION  07/11/2018     CARDIOVERSION  07/11/2018     CARDIOVERSION  11/19/2021     COLONOSCOPY N/A 4/28/2017     CV CORONARY ANGIOGRAM N/A 9/20/2017     CV CORONARY ANGIOGRAM N/A 1/24/2022     CV LEFT HEART CATH N/A 1/24/2022     CV RIGHT AND LEFT HEART CATH N/A 1/24/2022     EP ICD INSERT       FRACTURE SURGERY Left      INGUINAL HERNIA REPAIR Left 1967     INSERT / REPLACE / REMOVE PACEMAKER       IR MISCELLANEOUS PROCEDURE  4/30/2014     OTHER SURGICAL HISTORY       RI ABLATE HEART DYSRHYTHM FOCUS  04/2011     RI ABLATE HEART DYSRHYTHM FOCUS  07/2011     TOTAL SHOULDER REPLACEMENT Right 03/03/2016     WRIST SURGERY Left      ZZC MYERS W/O FACETEC FORAMOT/DSKC 1/2 VRT SEG, CERVICAL         FAMILY HISTORY:  No family history of pulmonary hypertension or rheumatologic disease.  Family History   Problem Relation Age of Onset     Cancer Mother         leukemia     Cancer  Father         bladder     Cancer Sister         breast with lung met.     Aneurysm Sister      CABG Brother      CABG Brother      Valvular heart disease Brother         valve replacement       SOCIAL HISTORY:  Social History     Socioeconomic History     Marital status:      Spouse name: Not on file     Number of children: Not on file     Years of education: Not on file     Highest education level: Not on file   Occupational History     Not on file   Tobacco Use     Smoking status: Former Smoker     Packs/day: 1.00     Years: 4.00     Pack years: 4.00     Types: Cigarettes     Quit date: 1968     Years since quittin.2     Smokeless tobacco: Never Used   Substance and Sexual Activity     Alcohol use: Yes     Alcohol/week: 2.0 standard drinks     Comment: Alcoholic Drinks/day: 1 beer per week     Drug use: No     Sexual activity: Yes     Partners: Female     Birth control/protection: Post-menopausal   Other Topics Concern     Parent/sibling w/ CABG, MI or angioplasty before 65F 55M? Not Asked   Social History Narrative    Preloaded 2013     Social Determinants of Health     Financial Resource Strain: Not on file   Food Insecurity: Not on file   Transportation Needs: Not on file   Physical Activity: Not on file   Stress: Not on file   Social Connections: Not on file   Intimate Partner Violence: Not on file   Housing Stability: Not on file       CURRENT MEDICATIONS:  Current Outpatient Medications   Medication Sig     acetaminophen (TYLENOL) 325 MG tablet Take 650 mg by mouth 2 times daily     albuterol (PROAIR HFA/PROVENTIL HFA/VENTOLIN HFA) 108 (90 Base) MCG/ACT inhaler Inhale 2 puffs into the lungs every 4 hours as needed for shortness of breath / dyspnea or wheezing     Ascorbic Acid (VITAMIN C) 500 MG CAPS Take 1,000 mg by mouth daily     atorvastatin (LIPITOR) 40 MG tablet Take 40 mg by mouth daily     budesonide-formoterol (SYMBICORT) 160-4.5 MCG/ACT Inhaler Inhale 2 puffs into the lungs 2  "times daily     co-enzyme Q-10 100 MG CAPS capsule Take 100 mg by mouth daily      fish oil-omega-3 fatty acids 1000 MG capsule Take 2 g by mouth 2 times daily     FLUoxetine (PROZAC) 20 MG capsule TAKE 1 CAPSULE BY MOUTH EVERY DAY     furosemide (LASIX) 80 MG tablet Take 1 tablet (80 mg) by mouth 2 times daily     losartan (COZAAR) 50 MG tablet Take 1 tablet (50 mg) by mouth daily     magnesium chloride 535 (64 Mg) MG TBEC CR tablet Take 64 mg by mouth 2 times daily     multivitamin, therapeutic (THERA-VIT) TABS tablet Take 1 tablet by mouth daily     nitroGLYcerin (NITROSTAT) 0.4 MG sublingual tablet One tablet under the tongue every 5 minutes if needed for chest pain. May repeat every 5 minutes for a maximum of 3 doses in 15 minutes\"     OXYGEN-HELIUM IN 4-5 L      polyethylene glycol (MIRALAX) 17 GM/Dose powder Take 17 g by mouth daily      sotalol (BETAPACE) 160 MG tablet Take 160 mg by mouth 2 times daily     tiotropium (SPIRIVA) 18 MCG inhaled capsule Inhale 18 mcg into the lungs daily     vitamin D2 (ERGOCALCIFEROL) 37523 units (1250 mcg) capsule TAKE 1 CAPSULE BY MOUTH 2 TIMES A WEEK     warfarin ANTICOAGULANT (COUMADIN) 2.5 MG tablet Take 1 to 2 tablets (2.5 - 5 mg) daily by mouth. Adjust dose based on INR results as directed.     No current facility-administered medications for this visit.       ROS:   10 point ROS negative except as discussed in above HPI.    EXAM:  /80 (BP Location: Right arm, Patient Position: Sitting, Cuff Size: Adult Large)   Pulse 76   Ht 1.9 m (6' 2.8\")   Wt 122.2 kg (269 lb 6.4 oz)   SpO2 95%   BMI 33.85 kg/m    General: appears comfortable, alert and articulate  Head: normocephalic, atraumatic  Eyes: anicteric sclera, EOMI  Heart: regular, normal S1, loud P2, 2/6 systolic ejection murmur at LLSB, no gallop or rub, estimated JVP 12 cm, 1+ radial pulses  Lungs: clear to auscultation bilaterally, no rales or wheezing  Abdomen: soft, non-tender, bowel sounds present, no " hepatosplenomegaly  Extremities: no clubbing, cyanosis or edema  Neurological: normal speech and affect, no gross motor deficits      Echocardiogram 12/8/21:  1. Left ventricular systolic function is normal. The left ventricle is normal  in size. There is normal left ventricular wall thickness.Left ventricular  diastolic function is normal. No regional wall motion abnormalities noted.  2. The right ventricle is mildly dilated. Mildly decreased right ventricular  systolic function.  3. There is mild to moderate (1-2+) tricuspid regurgitation.  4. The right ventricular systolic pressure is approximated at 45.7mmHg plus  the right atrial pressure.Right ventricular systolic pressure is elevated,  consistent with moderate pulmonary hypertension. Normal RA pressure of 3 mmHg.  6. The ascending aorta is mildly dilated at 42 mm.     RHC and coronary angiogram 1/24/22:  RA: 10  RV: 55/14  PA: 66/24 (37)  PCWP: 14  TD CO: 5.7  PVR: 4.0     Mild coronary artery disease with patent LAD stents.     PFTs 11/10/20:  FVC: 3.69 L (76%)  FEV1: 2.42 L (68%)  FEV1/FVC: 0.66  DLCO corrected: 46%  No improvement with bronchodilator     CT chest without contrast 7/15/19:   1.  Nodular opacity of the left lower lobe of interest on 04/17/2019 is less conspicuous today and probably explained by scarring. Additional 6 month chest CT follow-up is recommended.  2.  Emphysema and scattered areas of scarring elsewhere in both lungs.  3.  Advanced multivessel coronary artery disease.     CT PE 10/18/17:  1.  No evidence of pulmonary embolus.  2.  New mild pneumonia superior segment of the left lower lobe.  3.  Previously seen cavitary nodule in the left lower lobe has resolved.  4.  Severe coronary artery calcification.    NM Lung Perfusion Scan 2/21/22:  FINDINGS: Normal perfusion to both lungs. No segmental perfusion defects.     6MWT 7/17/19: Ambulated 320 meters, lowest saturation 87% on room air    6MWT 2/15/22: Ambulated 232 meters (reduced  from 320 meters in 7/2019), lowest saturation 81% on 6 L/min O2      Assessment and Plan:     Buck Sinclair is a very pleasant 77 year old male with Group 3 pulmonary hypertension who presents for follow-up.    Group 3 PH due to COPD: He is WHO functional class III. We will enroll him in the PERFECT trial with inhaled Tyvaso today. We will discuss with Dr. Payton (Pulmonary) whether to increase his supplemental oxygen use since his oxygen saturation decreased to 81% on 6 L/min oxygen on his most recent 6MWT. A sleep study has been ordered. We will order a CT chest without contrast for the PERFECT trial.    Follow-up determined by PERFECT trial.      It was a pleasure seeing Buck Sinclair at the [facility]. Please contact us with any questions or concerns that you may have.      Patient was seen and discussed with staff attending, Dr. Morales.      Sincerely,      Michelle Villanueva MD, PhD  Cardiology Fellow    And    Jose Morales MD, PhD   of Medicine  [facility]  Cardiovascular Division  [facility]    Staff Attestation:   I have seen and examined this patient on March 1, 2022. I have discussed with the team and agree with the findings and plan in this note. I have personally reviewed vital signs, hemodynamics, medications, laboratory values, and diagnostic testing.     Group 3 PH. We will enroll in PERFECT trial.    Jose Morales MD, PhD  Cardiovascular Division  [facility]       I spent a total of 45 minutes on the date of service evaluating this patient which included face to face discussion, performing a physical exam, reviewing of the chart to gain information from other providers to obtain further history, evaluated his hemodynamics showing pre-capillary PH due to his COPD, his 6MWT showing hypoxemia with exertion, ordering tests and/or medications, and documenting clinical  information in the electronic health record.

## 2022-03-01 NOTE — PROGRESS NOTES
Title of Research Study: A Phase 3, Randomized, Placebo-controlled, Double-blind, Adaptive Study to Evaluate the Safety and Efficacy of Inhaled Treprostinil in Patients With Pulmonary Hypertension Due to Chronic Obstructive Pulmonary Disease (PH-COPD) PERFECT COPD [facility]-304    IRB# [id number]     PI: Barb Day MD (p) [phone]  : Jacki Stock RN (p) [phone] and Natasha Oviedo [facility] [phone]  Estimated duration of study: Original Crossover Design, subjects will undergo 26 weeks of treatment (over a 34-week period): at least 2 weeks of low dose (3 breaths QID) inhaled treprostinil treatment during the Screening Period. 24 weeks on either active or placebo, as per randomization, during treatment Periods 1 and 2.     Subject was approached for possible participation in the above study a few weeks ago. The current IRB approved consent form was reviewed and discussed at length with the subject and wife prior to this visit via phone and today.  This included purpose, research hypothesis, nature of the research, risks & benefits, and procedures required for screening, enrollment, randomization (Treprostinil versus Placebo) as well as all required follow up.   Screening period and treatment groups were explained in detail.  Discussed confidentiality issues, compensation for injury, and alternative treatments/procedures available. Subject was informed that participation is voluntary and that refusal to participate will involve no penalty or decrease benefits to which the subject is otherwise entitled. Optional CT imaging and Biomarker/Genetic Sequencing sub studies were discussed and offered. Patient and wife have had the opportunity to read the entire written consent, ask questions and concerns of the study investigator and offered sufficient time to consider the research trial.  Patient was able to clearly state what study participation involved and the associated requirements.   All questions and concerns were addressed. Patient voluntarily signed the consent/HIPAA form on 3/1/2022 at 8:55 prior to any research required tests / procedures and was given a copy of the signed form.    Consented to Optional CT Imaging: YES    Consented to Optional Biomarker and Genetic Sequencing Evaluation: NO

## 2022-03-01 NOTE — PROGRESS NOTES
ANTICOAGULATION MANAGEMENT     Buck Sinclair 77 year old male is on warfarin with therapeutic INR result. (Goal INR 2.0-3.0)    Recent labs: (last 7 days)     03/01/22  0000   INR 2.70       ASSESSMENT     Source(s): Chart Review and Patient/Caregiver Call     Warfarin doses taken: Warfarin taken as instructed  Diet: No new diet changes identified  New illness, injury, or hospitalization: No  Medication/supplement changes: None noted  Signs or symptoms of bleeding or clotting: No  Previous INR: Supratherapeutic  Additional findings: None       PLAN     Recommended plan for no diet, medication or health factor changes affecting INR     Dosing Instructions: Continue your current warfarin dose with next INR in 1 week       Summary  As of 3/1/2022    Full warfarin instructions:  5 mg every Mon, Thu; 2.5 mg all other days   Next INR check:  3/8/2022             Telephone call with  pt wife,  who verbalizes understanding and agrees to plan and who agrees to plan and repeated back plan correctly    Patient to recheck with home meter    Education provided: Please call back if any changes to your diet, medications or how you've been taking warfarin and Contact [phone]  with any changes, questions or concerns.     Plan made per [facility] anticoagulation protocol    Fay Lewis, RN  Anticoagulation Clinic  3/1/2022    _______________________________________________________________________     Anticoagulation Episode Summary     Current INR goal:  2.0-3.0   TTR:  87.5 % (1 y)   Target end date:  Indefinite   Send INR reminders to:  LARISA RICHARDS    Indications    Persistent Atrial Fibrillation [I48.91]  Persistent atrial fibrillation (H) [I48.19]           Comments:  Home monitor ( [employer] )managed by exception         Anticoagulation Care Providers     Provider Role Specialty Phone number    Erica Hansen APRN CNP Referring Cardiovascular Disease [phone]    Everett Renee MD  Cardiovascular Disease  [phone]

## 2022-03-02 LAB
DLCOUNC-%PRED-PRE: 35 %
DLCOUNC-PRE: 10.25 ML/MIN/MMHG
DLCOUNC-PRED: 28.8 ML/MIN/MMHG
ERV-%PRED-PRE: 104 %
ERV-PRE: 1.04 L
ERV-PRED: 1 L
EXPTIME-PRE: 10.1 SEC
FEF2575-%PRED-PRE: 36 %
FEF2575-PRE: 0.88 L/SEC
FEF2575-PRED: 2.41 L/SEC
FEFMAX-%PRED-PRE: 92 %
FEFMAX-PRE: 8.1 L/SEC
FEFMAX-PRED: 8.77 L/SEC
FEV1-%PRED-PRE: 66 %
FEV1-PRE: 2.3 L
FEV1FEV6-PRE: 64 %
FEV1FEV6-PRED: 77 %
FEV1FVC-PRE: 59 %
FEV1FVC-PRED: 74 %
FEV1SVC-PRE: 60 %
FEV1SVC-PRED: 63 %
FIFMAX-PRE: 6.88 L/SEC
FRCPLETH-%PRED-PRE: 97 %
FRCPLETH-PRE: 3.96 L
FRCPLETH-PRED: 4.06 L
FVC-%PRED-PRE: 83 %
FVC-PRE: 3.93 L
FVC-PRED: 4.72 L
IC-%PRED-PRE: 63 %
IC-PRE: 2.82 L
IC-PRED: 4.48 L
RVPLETH-%PRED-PRE: 98 %
RVPLETH-PRE: 2.92 L
RVPLETH-PRED: 2.96 L
TLCPLETH-%PRED-PRE: 83 %
TLCPLETH-PRE: 6.78 L
TLCPLETH-PRED: 8.14 L
VA-%PRED-PRE: 76 %
VA-PRE: 5.71 L
VC-%PRED-PRE: 70 %
VC-PRE: 3.86 L
VC-PRED: 5.48 L

## 2022-03-08 ENCOUNTER — DOCUMENTATION ONLY (OUTPATIENT)
Dept: ANTICOAGULATION | Facility: CLINIC | Age: 77
End: 2022-03-08

## 2022-03-08 ENCOUNTER — RESEARCH ENCOUNTER (OUTPATIENT)
Dept: CARDIOLOGY | Facility: CLINIC | Age: 77
End: 2022-03-08

## 2022-03-08 DIAGNOSIS — I48.91 ATRIAL FIBRILLATION (H): Primary | ICD-10-CM

## 2022-03-08 DIAGNOSIS — I48.19 PERSISTENT ATRIAL FIBRILLATION (H): ICD-10-CM

## 2022-03-08 DIAGNOSIS — Z00.6 EXAMINATION OF PARTICIPANT OR CONTROL IN CLINICAL RESEARCH: Primary | ICD-10-CM

## 2022-03-08 LAB — INR HOME MONITORING: 2.1 (ref 2–3)

## 2022-03-08 NOTE — PROGRESS NOTES
"MEDICATION INSTRUCTIONS  Take 3 breaths four times a day.    BREATHING TECHNIQUES FOR TYVASO  1. Ensure proper breathing technique by:    Sealing your lips completely around the mouthpiece.    Maintaining normal breathing patterns.    Inhaling approximately 2-3 seconds and breathing \"normal full breaths.\"    Do not hold breath once medication is inhaled.  2. Use the pause button as needed if coughing develops or if you need to pause treatment after starting a session.   3. Keep the inhalation device leveled when doing treatment sessions.     GENERAL INFORMATION  1. Use 1 ampule of inhaled Tyvaso daily.  2. Keep ampules in the foil pouch. The Tyvaso medication is sensitive to light.   3. Use opened foil packs within 7 days.   4. Do not reuse medicine cup and filter membrane. Replace parts daily.   5. Do not use or place the device near microwaves, ovens, flammable liquids/materials, heated surfaces, or other strong electric of magnetic fields (eg. MRI machine).  6. Charge the battery pack nightly. A battery may take up to 40 hours to fully charge.    A fully charged batter typically last up to about 200 inhalations.    A battery charged for one night last about 10 hours (or about 40 inhalations).  7. Wash the plastic parts daily with mild, soapy warm water, rinse thoroughly, and let air dry. Use a clean cloth to wipe the interior of the inhalation device chamber weekly.     OTHER INSTRUCTIONS  1. Do NOT discard any empty ampules. Bring ALL ampules (empty or full) back to each visit.   2. If medication spills on your hand, make sure the wash your hands immediately to avoid skin irritation.   3. Contact your  if you notice any of the following side effects: Dizziness, abnormal bleeding, coughing, headache, throat irritation/pain, and/or nausea.  4. If you notice an allergic reaction, please call us. If an allergic reaction is so severe your throat is closing up and cannot breath, call 911.   5. If " anyone prescribes a new medication, call us before starting this new medication to ensure that it is safe to take with the study drug.     CONTACT INFORMATION  Primary Coordinator: Jacki Stock RN, CCRC () [phone]   Research Associate: Natasha Oviedo [facility] () [phone]

## 2022-03-08 NOTE — PROGRESS NOTES
ANTICOAGULATION  MANAGEMENT-Home Monitor Managed by Exception    Buck JONES Sinclair 77 year old male is on warfarin with therapeutic INR result. (Goal INR 2.0-3.0)    Recent labs: (last 7 days)     03/08/22  0000   INR 2.10       Previous INR was Therapeutic  Medication, diet, health changes since last INR:Starting Tyvaso nebulizer. This medication can increase risk of bleeding. [employer] message sent.  Contacted within the last 12 weeks by phone on 03/01/2022      PLAN     Buck was NOT contacted regarding therapeutic result today per home monitoring policy manage by exception agreement.   Current warfarin dose is to be continued:     Summary  As of 3/8/2022    Full warfarin instructions:  5 mg every Mon, Thu; 2.5 mg all other days   Next INR check:  3/15/2022               Duc Samuel RN  Anticoagulation Clinic  3/8/2022    _______________________________________________________________________     Anticoagulation Episode Summary     Current INR goal:  2.0-3.0   TTR:  88.1 % (1 y)   Target end date:  Indefinite   Send INR reminders to:  LARISA RICHARDS    Indications    Persistent Atrial Fibrillation [I48.91]  Persistent atrial fibrillation (H) [I48.19]           Comments:  Home monitor ( [employer] )managed by exception         Anticoagulation Care Providers     Provider Role Specialty Phone number    Erica Hansen APRN CNP Referring Cardiovascular Disease [phone]    Everett Renee MD  Cardiovascular Disease [phone]

## 2022-03-15 ENCOUNTER — APPOINTMENT (OUTPATIENT)
Dept: CARDIOLOGY | Facility: CLINIC | Age: 77
End: 2022-03-15
Payer: COMMERCIAL

## 2022-03-15 ENCOUNTER — OFFICE VISIT (OUTPATIENT)
Dept: CARDIOLOGY | Facility: CLINIC | Age: 77
End: 2022-03-15
Payer: OTHER MISCELLANEOUS

## 2022-03-15 ENCOUNTER — DOCUMENTATION ONLY (OUTPATIENT)
Dept: ANTICOAGULATION | Facility: CLINIC | Age: 77
End: 2022-03-15

## 2022-03-15 VITALS
WEIGHT: 272.6 LBS | RESPIRATION RATE: 16 BRPM | BODY MASS INDEX: 33.89 KG/M2 | SYSTOLIC BLOOD PRESSURE: 90 MMHG | DIASTOLIC BLOOD PRESSURE: 62 MMHG | HEART RATE: 96 BPM | HEIGHT: 75 IN

## 2022-03-15 DIAGNOSIS — I50.32 CHRONIC HEART FAILURE WITH PRESERVED EJECTION FRACTION (H): ICD-10-CM

## 2022-03-15 DIAGNOSIS — I48.19 PERSISTENT ATRIAL FIBRILLATION (H): ICD-10-CM

## 2022-03-15 DIAGNOSIS — I48.19 PERSISTENT ATRIAL FIBRILLATION (H): Chronic | ICD-10-CM

## 2022-03-15 DIAGNOSIS — I48.91 ATRIAL FIBRILLATION (H): Primary | ICD-10-CM

## 2022-03-15 DIAGNOSIS — I10 ESSENTIAL HYPERTENSION: Primary | Chronic | ICD-10-CM

## 2022-03-15 DIAGNOSIS — I48.19 PERSISTENT ATRIAL FIBRILLATION (H): Primary | ICD-10-CM

## 2022-03-15 LAB — INR HOME MONITORING: 2.1 (ref 2–3)

## 2022-03-15 PROCEDURE — 99214 OFFICE O/P EST MOD 30 MIN: CPT | Performed by: NURSE PRACTITIONER

## 2022-03-15 RX ORDER — SOTALOL HYDROCHLORIDE 160 MG/1
160 TABLET ORAL 2 TIMES DAILY
Qty: 180 TABLET | Refills: 3 | Status: SHIPPED | OUTPATIENT
Start: 2022-03-15 | End: 2022-05-20

## 2022-03-15 NOTE — PROGRESS NOTES
ANTICOAGULATION  MANAGEMENT-Home Monitor Managed by Exception    Buck JONES Sinclair 77 year old male is on warfarin with therapeutic INR result. (Goal INR 2.0-3.0)    Recent labs: (last 7 days)     03/15/22  0000   INR 2.10       Previous INR was Therapeutic  Medication, diet, health changes since last INR:chart reviewed; none identified  Contacted within the last 12 weeks by phone on 3/1/22      LUCAS     Buck was NOT contacted regarding therapeutic result today per home monitoring policy manage by exception agreement.   Current warfarin dose is to be continued:     Summary  As of 3/15/2022    Full warfarin instructions:  5 mg every Mon, Thu; 2.5 mg all other days   Next INR check:  3/29/2022               Michael Holliday RN  Anticoagulation Clinic  3/15/2022    _______________________________________________________________________     Anticoagulation Episode Summary     Current INR goal:  2.0-3.0   TTR:  89.2 % (1 y)   Target end date:  Indefinite   Send INR reminders to:  LARISA RICHARDS    Indications    Persistent Atrial Fibrillation [I48.91]  Persistent atrial fibrillation (H) [I48.19]           Comments:  Home monitor ( [employer] )managed by exception         Anticoagulation Care Providers     Provider Role Specialty Phone number    Erica Hansen APRN CNP Referring Cardiovascular Disease [phone]    Everett Renee MD  Cardiovascular Disease [phone]

## 2022-03-15 NOTE — PROGRESS NOTES
Assessment/Recommendations   Assessment:    1.  Heart failure with preserved ejection fraction, NYHA class III: Compensated.  He has chronic dyspnea on exertion and mild fatigue.  His weight has remained stable.  He denies orthopnea, PND or edema.  He is trying to follow a low-sodium diet.  He did try open arms for approximately 3 weeks but did not enjoy the food.    2.  Hypertension: Controlled.  Blood pressure 90/62.  Has occasional lightheadedness when standing too quickly.  3.  Persistent atrial fibrillation: Remains in normal sinus rhythm.  Successful cardioversion in November.  He continues sotalol.  He is on warfarin for anticoagulation    Plan:  1.  Continue current medications  2.  Continue daily weights and low-salt diet  3.  Encourage regular activity    Buck Sinclair will follow up with Dr. Garrett in May and in the heart failure clinic in 6 months.     History of Present Illness/Subjective    Mr. Buck Sinclair is a 77 year old male seen at [facility] heart failure clinic today for continued follow-up.    His wife Neela accompanies him today.  He follows up for heart failure with preserved ejection fraction.  He had an echocardiogram December 8 which showed a normal ejection fraction.  Also showed a mild mitral regurgitation and RV mildly dilated.  Showed elevated RV systolic pressure consistent with a moderate pulmonary hypertension.  He had a successful cardioversion in November and has remained in normal rhythm.  He has a past medical history significant for hypertension, pulmonary hypertension, persistent atrial fibrillation, ICD, and COPD.  He follows with Dr. Morales from the [facility] for his PH.  He recently started a research study for his pH which will last approximately 6 months.    Today, he continues to have fatigue and dyspnea on exertion.  He is on chronic oxygen.  He denies lightheadedness, orthopnea, PND, palpitations, chest pain, abdominal fullness/bloating and lower  "extremity edema.      He is monitoring home weights which are stable between 260-263 pounds.  He is following a low sodium diet.         Physical Examination Review of Systems   BP 90/62 (BP Location: Left arm, Patient Position: Sitting, Cuff Size: Adult Large)   Pulse 96   Resp 16   Ht 1.905 m (6' 3\")   Wt 123.7 kg (272 lb 9.6 oz)   BMI 34.07 kg/m    Body mass index is 34.07 kg/m .  Wt Readings from Last 3 Encounters:   03/15/22 123.7 kg (272 lb 9.6 oz)   03/01/22 122.2 kg (269 lb 6.4 oz)   02/18/22 121.1 kg (267 lb)       General Appearance:   no acute distress   ENT/Mouth: membranes moist, no oral lesions or bleeding gums.      EYES:  no scleral icterus, normal conjunctivae   Neck: no carotid bruits or thyromegaly   Chest/Lungs:   lungs are clear to auscultation, no rales or wheezing, equal chest wall expansion    Cardiovascular:   Regular. Normal first and second heart sounds with no murmurs, rubs, or gallops; Jugular venous pressure normal, no edema bilaterally    Abdomen:  no organomegaly, masses, bruits, or tenderness; bowel sounds are present   Extremities: no cyanosis or clubbing   Skin: no xanthelasma, warm.    Neurologic: normal  bilateral, no tremors     Psychiatric: alert and oriented x3    Enc Vitals  BP: 90/62  Pulse: 96  Resp: 16  Weight: 123.7 kg (272 lb 9.6 oz)  Height: 190.5 cm (6' 3\")                                         Medical History  Surgical History Family History Social History   Past Medical History:   Diagnosis Date     Anemia      Asthma without status asthmaticus 5/5/2021     BPH (benign prostatic hyperplasia)      Cardiomyopathy (H)      COPD, group B, by GOLD 2017 classification (H)      Coronary artery disease due to calcified coronary lesion      Dyslipidemia, goal LDL below 70      Essential hypertension      History of transfusion      Persistent atrial fibrillation (H)      Pneumonia of left lower lobe due to infectious organism 10/4/2017     Skin cancer of trunk      " Status post catheter ablation of atrial fibrillation 6/7/2017    PVI 4-2011 (Cryo/PVI + roof line + CTI line) Re-do PVI 7-2011 (RFA/PVI + CFE + VIDYA + confirmed CTI line)     Ventricular tachycardia (H)     Past Surgical History:   Procedure Laterality Date     CARDIAC DEFIBRILLATOR PLACEMENT       CARDIOVERSION  07/11/2018    x20, last 2/12/15, 10/2015, 11/18/16, 6/16/17 by Lauren Foster CNP     CARDIOVERSION  07/11/2018     CARDIOVERSION  11/19/2021     COLONOSCOPY N/A 4/28/2017    Procedure: COLONOSCOPY with 2 ascending polyps and 1 transverse polyp;  Surgeon: Jose Whittington MD;  Location: [facility] GI;  Service:      CV CORONARY ANGIOGRAM N/A 9/20/2017    Procedure: Coronary Angiogram;  Surgeon: Sergio Cervantes MD;  Location: [facility] Cath Lab;  Service:      CV CORONARY ANGIOGRAM N/A 1/24/2022    Procedure: Coronary Angiogram;  Surgeon: Christi Saunders MD;  Location: [facility] CATH LAB CV     CV LEFT HEART CATH N/A 1/24/2022    Procedure: Left Heart Cath;  Surgeon: Christi Saunders MD;  Location: [facility] CATH LAB CV     CV RIGHT AND LEFT HEART CATH N/A 1/24/2022    Procedure: Right and Left Heart Catherization;  Surgeon: Christi Saunders MD;  Location: [facility] CATH LAB CV     EP ICD INSERT       FRACTURE SURGERY Left     wrist     INGUINAL HERNIA REPAIR Left 1967    while in the [employer] in [place] after 13 month in [place]     INSERT / REPLACE / REMOVE PACEMAKER       IR MISCELLANEOUS PROCEDURE  4/30/2014     OTHER SURGICAL HISTORY      left hand surgery---tendon repair     [place] ABLATE HEART DYSRHYTHM FOCUS  04/2011    Catheter Ablation Atrial Fibrillation PVI Apr 2011 (Cryo+RF-PVI + roof line + CTI line)     AL ABLATE HEART DYSRHYTHM FOCUS  07/2011    Re-do PVI Jul 2011 (RFA-PVI + CFE + VIDYA + confirmation of CTI line)     TOTAL SHOULDER REPLACEMENT Right 03/03/2016    Dr. Abernathy of [facility]     WRIST SURGERY Left      ZZC MYERS W/O FACETEC FORAMOT/DSKC 1/2 VRT SEG, CERVICAL      Laminectomy  Lumbar;  Recorded: 2012;    Family History   Problem Relation Age of Onset     Cancer Mother         leukemia     Cancer Father         bladder     Cancer Sister         breast with lung met.     Aneurysm Sister      CABG Brother      CABG Brother      Valvular heart disease Brother         valve replacement    Social History     Socioeconomic History     Marital status:      Spouse name: Not on file     Number of children: Not on file     Years of education: Not on file     Highest education level: Not on file   Occupational History     Not on file   Tobacco Use     Smoking status: Former Smoker     Packs/day: 1.00     Years: 4.00     Pack years: 4.00     Types: Cigarettes     Quit date: 1968     Years since quittin.2     Smokeless tobacco: Never Used   Substance and Sexual Activity     Alcohol use: Yes     Alcohol/week: 2.0 standard drinks     Comment: Alcoholic Drinks/day: 1 beer per week     Drug use: No     Sexual activity: Yes     Partners: Female     Birth control/protection: Post-menopausal   Other Topics Concern     Parent/sibling w/ CABG, MI or angioplasty before 65F 55M? Not Asked   Social History Narrative    Preloaded 2013     Social Determinants of Health     Financial Resource Strain: Not on file   Food Insecurity: Not on file   Transportation Needs: Not on file   Physical Activity: Not on file   Stress: Not on file   Social Connections: Not on file   Intimate Partner Violence: Not on file   Housing Stability: Not on file          Medications  Allergies   Current Outpatient Medications   Medication Sig Dispense Refill     acetaminophen (TYLENOL) 325 MG tablet Take 650 mg by mouth 2 times daily       albuterol (PROAIR HFA/PROVENTIL HFA/VENTOLIN HFA) 108 (90 Base) MCG/ACT inhaler Inhale 2 puffs into the lungs every 4 hours as needed for shortness of breath / dyspnea or wheezing       Ascorbic Acid (VITAMIN C) 500 MG CAPS Take 1,000 mg by mouth daily       atorvastatin  "(LIPITOR) 40 MG tablet Take 40 mg by mouth daily       budesonide-formoterol (SYMBICORT) 160-4.5 MCG/ACT Inhaler Inhale 2 puffs into the lungs 2 times daily       co-enzyme Q-10 100 MG CAPS capsule Take 100 mg by mouth daily        fish oil-omega-3 fatty acids 1000 MG capsule Take 2 g by mouth 2 times daily       FLUoxetine (PROZAC) 20 MG capsule TAKE 1 CAPSULE BY MOUTH EVERY DAY       furosemide (LASIX) 80 MG tablet Take 1 tablet (80 mg) by mouth 2 times daily 90 tablet 6     losartan (COZAAR) 50 MG tablet Take 1 tablet (50 mg) by mouth daily 90 tablet 3     magnesium chloride 535 (64 Mg) MG TBEC CR tablet Take 64 mg by mouth 2 times daily       multivitamin, therapeutic (THERA-VIT) TABS tablet Take 1 tablet by mouth daily       nitroGLYcerin (NITROSTAT) 0.4 MG sublingual tablet One tablet under the tongue every 5 minutes if needed for chest pain. May repeat every 5 minutes for a maximum of 3 doses in 15 minutes\" 25 tablet 3     OXYGEN-HELIUM IN 4-5 L        polyethylene glycol (MIRALAX) 17 GM/Dose powder Take 17 g by mouth daily        sotalol (BETAPACE) 160 MG tablet Take 160 mg by mouth 2 times daily       study - treprostinil OPEN LABEL, IDS# [id number], 0.6 MG/ML neb solution Inhale 3 Breaths (18 mcg) into the lungs 4 times daily for 14 days 1.68 mL 0     tiotropium (SPIRIVA) 18 MCG inhaled capsule Inhale 18 mcg into the lungs daily       vitamin D2 (ERGOCALCIFEROL) 22630 units (1250 mcg) capsule TAKE 1 CAPSULE BY MOUTH 2 TIMES A WEEK       warfarin ANTICOAGULANT (COUMADIN) 2.5 MG tablet Take 1 to 2 tablets (2.5 - 5 mg) daily by mouth. Adjust dose based on INR results as directed. 130 tablet 1    Allergies   Allergen Reactions     Adhesive Tape Other (See Comments)     ADHESIVE TAPE; SKIN IRRITATION; Skin pulled off with foam tape       Amiodarone      ADVERSE REACTION.  Sunlight sensitivity.     Lisinopril          Lab Results    Chemistry/lipid CBC Cardiac Enzymes/BNP/TSH/INR   Lab Results   Component Value Date "    CHOL 125 02/15/2022    HDL 59 02/15/2022    TRIG 55 02/15/2022    BUN 41 (H) 02/15/2022     02/15/2022    CO2 26 02/15/2022    Lab Results   Component Value Date    WBC 7.5 02/15/2022    HGB 11.0 (L) 02/15/2022    HCT 36.1 (L) 02/15/2022     (H) 02/15/2022     02/15/2022    Lab Results   Component Value Date     (H) 12/01/2020    TSH 1.32 02/15/2022    INR 2.10 03/08/2022             This note has been dictated using voice recognition software. Any grammatical, typographical, or context distortions are unintentional and inherent to the software    30 minutes spent on the date of encounter doing chart review, review of outside records, review of test results, patient visit, documentation and discussion with family.

## 2022-03-15 NOTE — LETTER
3/15/2022    Vivek Guerrero MD  [facility] [address]  [facility] [address]    RE: Buck Sinclair       Dear Colleague,     I had the pleasure of seeing Buck Sinclair in the [facility].        Assessment/Recommendations   Assessment:    1.  Heart failure with preserved ejection fraction, NYHA class III: Compensated.  He has chronic dyspnea on exertion and mild fatigue.  His weight has remained stable.  He denies orthopnea, PND or edema.  He is trying to follow a low-sodium diet.  He did try open arms for approximately 3 weeks but did not enjoy the food.    2.  Hypertension: Controlled.  Blood pressure 90/62.  Has occasional lightheadedness when standing too quickly.  3.  Persistent atrial fibrillation: Remains in normal sinus rhythm.  Successful cardioversion in November.  He continues sotalol.  He is on warfarin for anticoagulation    Plan:  1.  Continue current medications  2.  Continue daily weights and low-salt diet  3.  Encourage regular activity    Buck Sinclair will follow up with Dr. Garrett in May and in the heart failure clinic in 6 months.     History of Present Illness/Subjective    Mr. Buck Sinclair is a 77 year old male seen at [facility] heart failure clinic today for continued follow-up.    His wife Neela accompanies him today.  He follows up for heart failure with preserved ejection fraction.  He had an echocardiogram December 8 which showed a normal ejection fraction.  Also showed a mild mitral regurgitation and RV mildly dilated.  Showed elevated RV systolic pressure consistent with a moderate pulmonary hypertension.  He had a successful cardioversion in November and has remained in normal rhythm.  He has a past medical history significant for hypertension, pulmonary hypertension, persistent atrial fibrillation, ICD, and COPD.  He follows with Dr. Morales from the  of  for his PH.  He recently started a research study for his pH which  "will last approximately 6 months.    Today, he continues to have fatigue and dyspnea on exertion.  He is on chronic oxygen.  He denies lightheadedness, orthopnea, PND, palpitations, chest pain, abdominal fullness/bloating and lower extremity edema.      He is monitoring home weights which are stable between 260-263 pounds.  He is following a low sodium diet.         Physical Examination Review of Systems   BP 90/62 (BP Location: Left arm, Patient Position: Sitting, Cuff Size: Adult Large)   Pulse 96   Resp 16   Ht 1.905 m (6' 3\")   Wt 123.7 kg (272 lb 9.6 oz)   BMI 34.07 kg/m    Body mass index is 34.07 kg/m .  Wt Readings from Last 3 Encounters:   03/15/22 123.7 kg (272 lb 9.6 oz)   03/01/22 122.2 kg (269 lb 6.4 oz)   02/18/22 121.1 kg (267 lb)       General Appearance:   no acute distress   ENT/Mouth: membranes moist, no oral lesions or bleeding gums.      EYES:  no scleral icterus, normal conjunctivae   Neck: no carotid bruits or thyromegaly   Chest/Lungs:   lungs are clear to auscultation, no rales or wheezing, equal chest wall expansion    Cardiovascular:   Regular. Normal first and second heart sounds with no murmurs, rubs, or gallops; Jugular venous pressure normal, no edema bilaterally    Abdomen:  no organomegaly, masses, bruits, or tenderness; bowel sounds are present   Extremities: no cyanosis or clubbing   Skin: no xanthelasma, warm.    Neurologic: normal  bilateral, no tremors     Psychiatric: alert and oriented x3    Enc Vitals  BP: 90/62  Pulse: 96  Resp: 16  Weight: 123.7 kg (272 lb 9.6 oz)  Height: 190.5 cm (6' 3\")                                         Medical History  Surgical History Family History Social History   Past Medical History:   Diagnosis Date     Anemia      Asthma without status asthmaticus 5/5/2021     BPH (benign prostatic hyperplasia)      Cardiomyopathy (H)      COPD, group B, by GOLD 2017 classification (H)      Coronary artery disease due to calcified coronary lesion  "     Dyslipidemia, goal LDL below 70      Essential hypertension      History of transfusion      Persistent atrial fibrillation (H)      Pneumonia of left lower lobe due to infectious organism 10/4/2017     Skin cancer of trunk      Status post catheter ablation of atrial fibrillation 6/7/2017    PVI 4-2011 (Cryo/PVI + roof line + CTI line) Re-do PVI 7-2011 (RFA/PVI + CFE + VIDYA + confirmed CTI line)     Ventricular tachycardia (H)     Past Surgical History:   Procedure Laterality Date     CARDIAC DEFIBRILLATOR PLACEMENT       CARDIOVERSION  07/11/2018    x20, last 2/12/15, 10/2015, 11/18/16, 6/16/17 by Lauren Foster CNP     CARDIOVERSION  07/11/2018     CARDIOVERSION  11/19/2021     COLONOSCOPY N/A 4/28/2017    Procedure: COLONOSCOPY with 2 ascending polyps and 1 transverse polyp;  Surgeon: Jose Whittington MD;  Location: [facility] GI;  Service:      CV CORONARY ANGIOGRAM N/A 9/20/2017    Procedure: Coronary Angiogram;  Surgeon: Sergio Cervantes MD;  Location: [facility] Cath Lab;  Service:      CV CORONARY ANGIOGRAM N/A 1/24/2022    Procedure: Coronary Angiogram;  Surgeon: Christi Saunders MD;  Location: [facility] CATH LAB CV     CV LEFT HEART CATH N/A 1/24/2022    Procedure: Left Heart Cath;  Surgeon: Christi Saunders MD;  Location: [facility] CATH LAB CV     CV RIGHT AND LEFT HEART CATH N/A 1/24/2022    Procedure: Right and Left Heart Catherization;  Surgeon: Christi Saunders MD;  Location: [facility] CATH LAB CV     EP ICD INSERT       FRACTURE SURGERY Left     wrist     INGUINAL HERNIA REPAIR Left 1967    while in the [employer] in [place] after 13 month in [place]     INSERT / REPLACE / REMOVE PACEMAKER       IR MISCELLANEOUS PROCEDURE  4/30/2014     OTHER SURGICAL HISTORY      left hand surgery---tendon repair     [place] ABLATE HEART DYSRHYTHM FOCUS  04/2011    Catheter Ablation Atrial Fibrillation PVI Apr 2011 (Cryo+RF-PVI + roof line + CTI line)     [place] ABLATE HEART DYSRHYTHM FOCUS  07/2011    Re-do PVI Jul 2011 (RFA-PVI  + CFE + VIDYA + confirmation of CTI line)     TOTAL SHOULDER REPLACEMENT Right 2016    Dr. Abernathy of [facility]     WRIST SURGERY Left      ZZC MYERS W/O FACETEC FORALETHA/ARELY  VRT SEG, CERVICAL      Laminectomy Lumbar;  Recorded: 2012;    Family History   Problem Relation Age of Onset     Cancer Mother         leukemia     Cancer Father         bladder     Cancer Sister         breast with lung met.     Aneurysm Sister      CABG Brother      CABG Brother      Valvular heart disease Brother         valve replacement    Social History     Socioeconomic History     Marital status:      Spouse name: Not on file     Number of children: Not on file     Years of education: Not on file     Highest education level: Not on file   Occupational History     Not on file   Tobacco Use     Smoking status: Former Smoker     Packs/day: 1.00     Years: 4.00     Pack years: 4.00     Types: Cigarettes     Quit date: 1968     Years since quittin.2     Smokeless tobacco: Never Used   Substance and Sexual Activity     Alcohol use: Yes     Alcohol/week: 2.0 standard drinks     Comment: Alcoholic Drinks/day: 1 beer per week     Drug use: No     Sexual activity: Yes     Partners: Female     Birth control/protection: Post-menopausal   Other Topics Concern     Parent/sibling w/ CABG, MI or angioplasty before 65F 55M? Not Asked   Social History Narrative    Preloaded 2013     Social Determinants of Health     Financial Resource Strain: Not on file   Food Insecurity: Not on file   Transportation Needs: Not on file   Physical Activity: Not on file   Stress: Not on file   Social Connections: Not on file   Intimate Partner Violence: Not on file   Housing Stability: Not on file          Medications  Allergies   Current Outpatient Medications   Medication Sig Dispense Refill     acetaminophen (TYLENOL) 325 MG tablet Take 650 mg by mouth 2 times daily       albuterol (PROAIR HFA/PROVENTIL HFA/VENTOLIN  "HFA) 108 (90 Base) MCG/ACT inhaler Inhale 2 puffs into the lungs every 4 hours as needed for shortness of breath / dyspnea or wheezing       Ascorbic Acid (VITAMIN C) 500 MG CAPS Take 1,000 mg by mouth daily       atorvastatin (LIPITOR) 40 MG tablet Take 40 mg by mouth daily       budesonide-formoterol (SYMBICORT) 160-4.5 MCG/ACT Inhaler Inhale 2 puffs into the lungs 2 times daily       co-enzyme Q-10 100 MG CAPS capsule Take 100 mg by mouth daily        fish oil-omega-3 fatty acids 1000 MG capsule Take 2 g by mouth 2 times daily       FLUoxetine (PROZAC) 20 MG capsule TAKE 1 CAPSULE BY MOUTH EVERY DAY       furosemide (LASIX) 80 MG tablet Take 1 tablet (80 mg) by mouth 2 times daily 90 tablet 6     losartan (COZAAR) 50 MG tablet Take 1 tablet (50 mg) by mouth daily 90 tablet 3     magnesium chloride 535 (64 Mg) MG TBEC CR tablet Take 64 mg by mouth 2 times daily       multivitamin, therapeutic (THERA-VIT) TABS tablet Take 1 tablet by mouth daily       nitroGLYcerin (NITROSTAT) 0.4 MG sublingual tablet One tablet under the tongue every 5 minutes if needed for chest pain. May repeat every 5 minutes for a maximum of 3 doses in 15 minutes\" 25 tablet 3     OXYGEN-HELIUM IN 4-5 L        polyethylene glycol (MIRALAX) 17 GM/Dose powder Take 17 g by mouth daily        sotalol (BETAPACE) 160 MG tablet Take 160 mg by mouth 2 times daily       study - treprostinil OPEN LABEL, IDS# [id number], 0.6 MG/ML neb solution Inhale 3 Breaths (18 mcg) into the lungs 4 times daily for 14 days 1.68 mL 0     tiotropium (SPIRIVA) 18 MCG inhaled capsule Inhale 18 mcg into the lungs daily       vitamin D2 (ERGOCALCIFEROL) 34120 units (1250 mcg) capsule TAKE 1 CAPSULE BY MOUTH 2 TIMES A WEEK       warfarin ANTICOAGULANT (COUMADIN) 2.5 MG tablet Take 1 to 2 tablets (2.5 - 5 mg) daily by mouth. Adjust dose based on INR results as directed. 130 tablet 1    Allergies   Allergen Reactions     Adhesive Tape Other (See Comments)     ADHESIVE TAPE; SKIN " IRRITATION; Skin pulled off with foam tape       Amiodarone      ADVERSE REACTION.  Sunlight sensitivity.     Lisinopril          Lab Results    Chemistry/lipid CBC Cardiac Enzymes/BNP/TSH/INR   Lab Results   Component Value Date    CHOL 125 02/15/2022    HDL 59 02/15/2022    TRIG 55 02/15/2022    BUN 41 (H) 02/15/2022     02/15/2022    CO2 26 02/15/2022    Lab Results   Component Value Date    WBC 7.5 02/15/2022    HGB 11.0 (L) 02/15/2022    HCT 36.1 (L) 02/15/2022     (H) 02/15/2022     02/15/2022    Lab Results   Component Value Date     (H) 12/01/2020    TSH 1.32 02/15/2022    INR 2.10 03/08/2022             This note has been dictated using voice recognition software. Any grammatical, typographical, or context distortions are unintentional and inherent to the software    30 minutes spent on the date of encounter doing chart review, review of outside records, review of test results, patient visit, documentation and discussion with family.                Thank you for allowing me to participate in the care of your patient.      Sincerely,     Jessica Barrera, GIBRAN [facility]  cc:   No referring provider defined for this encounter.

## 2022-03-15 NOTE — PATIENT INSTRUCTIONS
Buck Sinclair,    It was a pleasure to see you today at [facility]ART [facility].     My recommendations after this visit include:  - Please follow up with Dr Garrett in May   - Please follow up with Jessica Barrera in 6 months  - Continue current medications    Jessica Barrera, CNP

## 2022-03-22 ENCOUNTER — DOCUMENTATION ONLY (OUTPATIENT)
Dept: ANTICOAGULATION | Facility: CLINIC | Age: 77
End: 2022-03-22
Payer: COMMERCIAL

## 2022-03-22 DIAGNOSIS — I48.19 PERSISTENT ATRIAL FIBRILLATION (H): ICD-10-CM

## 2022-03-22 DIAGNOSIS — I48.91 ATRIAL FIBRILLATION (H): Primary | ICD-10-CM

## 2022-03-22 LAB — INR HOME MONITORING: 2.5 (ref 2–3)

## 2022-03-22 NOTE — PROGRESS NOTES
ANTICOAGULATION  MANAGEMENT-Home Monitor Managed by Exception    Buck JONES Sinclair 77 year old male is on warfarin with therapeutic INR result. (Goal INR 2.0-3.0)    Recent labs: (last 7 days)     03/22/22  0000   INR 2.50       Previous INR was Therapeutic  Medication, diet, health changes since last INR:chart reviewed; none identified  Contacted within the last 12 weeks by phone on 3/1/22        LUCAS     Buck was NOT contacted regarding therapeutic result today per home monitoring policy manage by exception agreement.   Current warfarin dose is to be continued:     Summary  As of 3/22/2022    Full warfarin instructions:  5 mg every Mon, Thu; 2.5 mg all other days   Next INR check:  4/5/2022               Desire Cortez RN  Anticoagulation Clinic  3/22/2022    _______________________________________________________________________     Anticoagulation Episode Summary     Current INR goal:  2.0-3.0   TTR:  89.2 % (1 y)   Target end date:  Indefinite   Send INR reminders to:  LARISA RICHARDS    Indications    Persistent Atrial Fibrillation [I48.91]  Persistent atrial fibrillation (H) [I48.19]           Comments:  Home monitor ( [employer] )managed by exception         Anticoagulation Care Providers     Provider Role Specialty Phone number    Erica Hansen APRN CNP Referring Cardiovascular Disease [phone]    Everett Renee MD  Cardiovascular Disease [phone]

## 2022-03-25 RX ORDER — UBIDECARENONE 100 MG
200 CAPSULE ORAL DAILY
Qty: 2 CAPSULE | COMMUNITY
Start: 2022-03-25

## 2022-03-29 ENCOUNTER — DOCUMENTATION ONLY (OUTPATIENT)
Dept: ANTICOAGULATION | Facility: CLINIC | Age: 77
End: 2022-03-29
Payer: COMMERCIAL

## 2022-03-29 ENCOUNTER — TELEPHONE (OUTPATIENT)
Dept: ANTICOAGULATION | Facility: CLINIC | Age: 77
End: 2022-03-29
Payer: COMMERCIAL

## 2022-03-29 DIAGNOSIS — I48.91 ATRIAL FIBRILLATION (H): Primary | ICD-10-CM

## 2022-03-29 DIAGNOSIS — I48.19 PERSISTENT ATRIAL FIBRILLATION (H): ICD-10-CM

## 2022-03-29 LAB — INR HOME MONITORING: 2.5 (ref 2–3)

## 2022-03-29 NOTE — TELEPHONE ENCOUNTER
"received fax from [employer] for a prior authorization for warfarin.  Called and spoke to pharmacy staff to review and to make sure the pharmacy is trying to bill his primary insurance and not another insurance such as workman's comp.    Pharmacy ran it thru patient's primary insurance and it \"went thru\" with a co-pay of $20.    WOOD DavisN, RN  Anticoagulation Clinic    "

## 2022-03-29 NOTE — PROGRESS NOTES
ANTICOAGULATION  MANAGEMENT-Home Monitor Managed by Exception    Buck JONES Sinclair 77 year old male is on warfarin with therapeutic INR result. (Goal INR 2.0-3.0)    Recent labs: (last 7 days)     03/29/22  0000   INR 2.50       Previous INR was Therapeutic  Medication, diet, health changes since last INR:chart reviewed; none identified  Contacted within the last 12 weeks by phone on 3/1/22      LUCAS     Buck was NOT contacted regarding therapeutic result today per home monitoring policy manage by exception agreement.   Current warfarin dose is to be continued:     Summary  As of 3/29/2022    Full warfarin instructions:  5 mg every Mon, Thu; 2.5 mg all other days   Next INR check:  4/5/2022               Gisele Saul RN  Anticoagulation Clinic  3/29/2022    _______________________________________________________________________     Anticoagulation Episode Summary     Current INR goal:  2.0-3.0   TTR:  89.2 % (1 y)   Target end date:  Indefinite   Send INR reminders to:  LARISA RICHARDS    Indications    Persistent Atrial Fibrillation [I48.91]  Persistent atrial fibrillation (H) [I48.19]           Comments:  Home monitor ( [employer] )managed by exception         Anticoagulation Care Providers     Provider Role Specialty Phone number    Erica Hansen APRN CNP Referring Cardiovascular Disease [phone]    Everett Renee MD  Cardiovascular Disease [phone]

## 2022-04-04 ENCOUNTER — APPOINTMENT (OUTPATIENT)
Dept: CARDIOLOGY | Facility: CLINIC | Age: 77
End: 2022-04-04
Attending: INTERNAL MEDICINE
Payer: COMMERCIAL

## 2022-04-04 DIAGNOSIS — Z00.6 EXAMINATION OF PARTICIPANT OR CONTROL IN CLINICAL RESEARCH: Primary | ICD-10-CM

## 2022-04-04 DIAGNOSIS — Z00.6 EXAMINATION OF PARTICIPANT OR CONTROL IN CLINICAL RESEARCH: ICD-10-CM

## 2022-04-04 PROCEDURE — 94726 PLETHYSMOGRAPHY LUNG VOLUMES: CPT | Performed by: INTERNAL MEDICINE

## 2022-04-04 PROCEDURE — 94729 DIFFUSING CAPACITY: CPT | Performed by: INTERNAL MEDICINE

## 2022-04-04 PROCEDURE — 94375 RESPIRATORY FLOW VOLUME LOOP: CPT | Performed by: INTERNAL MEDICINE

## 2022-04-05 ENCOUNTER — DOCUMENTATION ONLY (OUTPATIENT)
Dept: ANTICOAGULATION | Facility: CLINIC | Age: 77
End: 2022-04-05
Payer: COMMERCIAL

## 2022-04-05 DIAGNOSIS — I48.19 PERSISTENT ATRIAL FIBRILLATION (H): ICD-10-CM

## 2022-04-05 DIAGNOSIS — I48.91 ATRIAL FIBRILLATION (H): Primary | ICD-10-CM

## 2022-04-05 LAB
DLCOUNC-%PRED-PRE: 32 %
DLCOUNC-PRE: 9.3 ML/MIN/MMHG
DLCOUNC-PRED: 28.8 ML/MIN/MMHG
ERV-%PRED-PRE: 89 %
ERV-PRE: 0.88 L
ERV-PRED: 0.98 L
EXPTIME-PRE: 13.54 SEC
FEF2575-%PRED-PRE: 35 %
FEF2575-PRE: 0.86 L/SEC
FEF2575-PRED: 2.41 L/SEC
FEFMAX-%PRED-PRE: 82 %
FEFMAX-PRE: 7.22 L/SEC
FEFMAX-PRED: 8.77 L/SEC
FEV1-%PRED-PRE: 64 %
FEV1-PRE: 2.22 L
FEV1FEV6-PRE: 64 %
FEV1FEV6-PRED: 77 %
FEV1FVC-PRE: 59 %
FEV1FVC-PRED: 74 %
FEV1SVC-PRE: 57 %
FEV1SVC-PRED: 63 %
FIFMAX-PRE: 7.6 L/SEC
FRCPLETH-%PRED-PRE: 87 %
FRCPLETH-PRE: 3.54 L
FRCPLETH-PRED: 4.06 L
FVC-%PRED-PRE: 80 %
FVC-PRE: 3.79 L
FVC-PRED: 4.72 L
IC-%PRED-PRE: 67 %
IC-PRE: 3.02 L
IC-PRED: 4.49 L
INR HOME MONITORING: 3 (ref 2–3)
RVPLETH-%PRED-PRE: 89 %
RVPLETH-PRE: 2.66 L
RVPLETH-PRED: 2.96 L
TLCPLETH-%PRED-PRE: 80 %
TLCPLETH-PRE: 6.56 L
TLCPLETH-PRED: 8.14 L
VA-%PRED-PRE: 75 %
VA-PRE: 5.61 L
VC-%PRED-PRE: 71 %
VC-PRE: 3.9 L
VC-PRED: 5.48 L

## 2022-04-05 NOTE — PROGRESS NOTES
ANTICOAGULATION  MANAGEMENT-Home Monitor Managed by Exception    Buck JONES Sinclair 77 year old male is on warfarin with therapeutic INR result. (Goal INR 2.0-3.0)    Recent labs: (last 7 days)     04/05/22  0000   INR 3.00       Previous INR was Therapeutic  Medication, diet, health changes since last INR:chart reviewed; none identified  Contacted within the last 12 weeks by phone on 3/1/22      LUCAS     Buck was NOT contacted regarding therapeutic result today per home monitoring policy manage by exception agreement.   Current warfarin dose is to be continued:     Summary  As of 4/5/2022    Full warfarin instructions:  5 mg every Mon, Thu; 2.5 mg all other days   Next INR check:  4/12/2022               Gisele Saul RN  Anticoagulation Clinic  4/5/2022    _______________________________________________________________________     Anticoagulation Episode Summary     Current INR goal:  2.0-3.0   TTR:  89.2 % (1 y)   Target end date:  Indefinite   Send INR reminders to:  LARISA RICHARDS    Indications    Persistent Atrial Fibrillation [I48.91]  Persistent atrial fibrillation (H) [I48.19]           Comments:  Home monitor ( [employer] )managed by exception         Anticoagulation Care Providers     Provider Role Specialty Phone number    Erica Hansen APRN CNP Referring Cardiovascular Disease [phone]    Everett Renee MD  Cardiovascular Disease [phone]

## 2022-04-12 ENCOUNTER — ANTICOAGULATION THERAPY VISIT (OUTPATIENT)
Dept: ANTICOAGULATION | Facility: CLINIC | Age: 77
End: 2022-04-12
Payer: COMMERCIAL

## 2022-04-12 DIAGNOSIS — I48.91 ATRIAL FIBRILLATION (H): Primary | ICD-10-CM

## 2022-04-12 DIAGNOSIS — I48.19 PERSISTENT ATRIAL FIBRILLATION (H): ICD-10-CM

## 2022-04-12 LAB — INR HOME MONITORING: 3.3 (ref 2–3)

## 2022-04-12 NOTE — PROGRESS NOTES
ANTICOAGULATION MANAGEMENT     Buck Sinclair 77 year old male is on warfarin with supratherapeutic INR result. (Goal INR 2.0-3.0)    Recent labs: (last 7 days)     04/12/22  0000   INR 3.30*       ASSESSMENT     Source(s): Chart Review and Patient/Caregiver Call     Warfarin doses taken: Warfarin taken as instructed  Diet: No new diet changes identified  New illness, injury, or hospitalization: No  Medication/supplement changes: Pt is enrolled in the Treprostinil trial. This medication can increase risk of bleeding but should not effect INR.  Signs or symptoms of bleeding or clotting: No  Previous INR: Therapeutic last 2(+) visits  Additional findings: None       PLAN     Recommended plan for no diet, medication or health factor changes affecting INR     Dosing Instructions: continue your current warfarin dose with next INR in 1 week       Summary  As of 4/12/2022    Full warfarin instructions:  5 mg every Mon, Thu; 2.5 mg all other days   Next INR check:  4/19/2022             Telephone call with  Neela who verbalizes understanding and agrees to plan    Patient to recheck with home meter    Education provided: Goal range and significance of current result    Plan made per [facility] anticoagulation protocol    Duc Samuel RN  Anticoagulation Clinic  4/12/2022    _______________________________________________________________________     Anticoagulation Episode Summary     Current INR goal:  2.0-3.0   TTR:  87.5 % (1 y)   Target end date:  Indefinite   Send INR reminders to:  LARISA RICHARDS    Indications    Persistent Atrial Fibrillation [I48.91]  Persistent atrial fibrillation (H) [I48.19]           Comments:  Home monitor ( [employer] )managed by exception         Anticoagulation Care Providers     Provider Role Specialty Phone number    Erica Hansen APRN CNP Referring Cardiovascular Disease [phone]    Everett Renee MD  Cardiovascular Disease [phone]

## 2022-04-15 DIAGNOSIS — Z00.6 EXAMINATION OF PARTICIPANT OR CONTROL IN CLINICAL RESEARCH: Primary | ICD-10-CM

## 2022-04-19 ENCOUNTER — ANTICOAGULATION THERAPY VISIT (OUTPATIENT)
Dept: ANTICOAGULATION | Facility: CLINIC | Age: 77
End: 2022-04-19
Payer: COMMERCIAL

## 2022-04-19 DIAGNOSIS — I48.19 PERSISTENT ATRIAL FIBRILLATION (H): Primary | ICD-10-CM

## 2022-04-19 LAB — INR HOME MONITORING: 2.7 (ref 2–3)

## 2022-04-19 NOTE — PROGRESS NOTES
ANTICOAGULATION MANAGEMENT     Buck Sinclair 77 year old male is on warfarin with therapeutic INR result. (Goal INR 2.0-3.0)    Recent labs: (last 7 days)     04/19/22  0000   INR 2.70       ASSESSMENT     Source(s): Chart Review and Patient/Caregiver Call     Warfarin doses taken: Warfarin taken as instructed  Diet: No new diet changes identified  New illness, injury, or hospitalization: No  Medication/supplement changes: None noted  Signs or symptoms of bleeding or clotting: No  Previous INR: Supratherapeutic  Additional findings: None       PLAN     Recommended plan for no diet, medication or health factor changes affecting INR     Dosing Instructions: continue your current warfarin dose with next INR in 1 week       Summary  As of 4/19/2022    Full warfarin instructions:  5 mg every Mon, Thu; 2.5 mg all other days   Next INR check:  4/26/2022             Telephone call with Buck who verbalizes understanding and agrees to plan and who agrees to plan and repeated back plan correctly    Patient to recheck with home meter    Education provided: Please call back if any changes to your diet, medications or how you've been taking warfarin    Plan made per [facility] anticoagulation protocol    Fay Lewis, RN  Anticoagulation Clinic  4/19/2022    _______________________________________________________________________     Anticoagulation Episode Summary     Current INR goal:  2.0-3.0   TTR:  86.6 % (1 y)   Target end date:  Indefinite   Send INR reminders to:  LARISA RICHARDS    Indications    Persistent Atrial Fibrillation [I48.91]  Persistent atrial fibrillation (H) [I48.19]           Comments:  Home monitor ( [employer] )managed by exception         Anticoagulation Care Providers     Provider Role Specialty Phone number    Erica Hansen APRN CNP Referring Cardiovascular Disease [phone]    Everett Renee MD  Cardiovascular Disease [phone]

## 2022-04-21 ENCOUNTER — TELEPHONE (OUTPATIENT)
Dept: CARDIOLOGY | Facility: CLINIC | Age: 77
End: 2022-04-21
Payer: COMMERCIAL

## 2022-04-21 DIAGNOSIS — I48.91 ATRIAL FIBRILLATION (H): ICD-10-CM

## 2022-04-21 DIAGNOSIS — I42.9 CARDIOMYOPATHY, UNSPECIFIED TYPE (H): ICD-10-CM

## 2022-04-21 RX ORDER — LOSARTAN POTASSIUM 50 MG/1
50 TABLET ORAL DAILY
Qty: 90 TABLET | Refills: 3 | Status: SHIPPED | OUTPATIENT
Start: 2022-04-21 | End: 2023-01-01

## 2022-04-21 NOTE — TELEPHONE ENCOUNTER
[facility] Call Center    Phone Message    May a detailed message be left on voicemail: yes     Reason for Call: Medication Refill Request    Has the patient contacted the pharmacy for the refill? Yes   Name of medication being requested: Losartan 50 mg  Provider who prescribed the medication: Thelma  Pharmacy: [facility] on [address]  Phone [phone]  Date medication is needed:Patient has a  Few days left    Patient has seen Jessica for CHF and Dr. Garrett also.    ALSO, Pt wants to know who to see for EP?--He has seen Dr. Lizama in the past, so just stay with him?    Please call patient and let him know who to see for EP.       Action Taken: Cardiology    Travel Screening: Not Applicable

## 2022-04-21 NOTE — TELEPHONE ENCOUNTER
Noted, refill sent as indicated below.  Pt wants to know which EP MD he should follow up with, notes from [facility] state he should follow up with Dr. Lizama.  He states understanding.

## 2022-04-26 ENCOUNTER — DOCUMENTATION ONLY (OUTPATIENT)
Dept: ANTICOAGULATION | Facility: CLINIC | Age: 77
End: 2022-04-26
Payer: COMMERCIAL

## 2022-04-26 DIAGNOSIS — I48.19 PERSISTENT ATRIAL FIBRILLATION (H): ICD-10-CM

## 2022-04-26 DIAGNOSIS — I48.91 ATRIAL FIBRILLATION (H): Primary | ICD-10-CM

## 2022-04-26 LAB — INR HOME MONITORING: 2.8 (ref 2–3)

## 2022-04-26 NOTE — PROGRESS NOTES
ANTICOAGULATION  MANAGEMENT-Home Monitor Managed by Exception    Buck JONES Sinclair 77 year old male is on warfarin with therapeutic INR result. (Goal INR 2.0-3.0)    Recent labs: (last 7 days)     04/26/22  0000   INR 2.80       Previous INR was Therapeutic  Medication, diet, health changes since last INR:chart reviewed; none identified  Contacted within the last 12 weeks by phone on 4/19/22      LUCAS     Buck was NOT contacted regarding therapeutic result today per home monitoring policy manage by exception agreement.   Current warfarin dose is to be continued:     Summary  As of 4/26/2022    Full warfarin instructions:  5 mg every Mon, Thu; 2.5 mg all other days   Next INR check:  5/3/2022               Gisele Saul RN  Anticoagulation Clinic  4/26/2022    _______________________________________________________________________     Anticoagulation Episode Summary     Current INR goal:  2.0-3.0   TTR:  86.6 % (1 y)   Target end date:  Indefinite   Send INR reminders to:  LARISA RICHARDS    Indications    Persistent Atrial Fibrillation [I48.91]  Persistent atrial fibrillation (H) [I48.19]           Comments:  Home monitor ( [employer] )managed by exception         Anticoagulation Care Providers     Provider Role Specialty Phone number    Erica Hansen APRN CNP Referring Cardiovascular Disease [phone]    Everett Renee MD  Cardiovascular Disease [phone]

## 2022-05-03 ENCOUNTER — DOCUMENTATION ONLY (OUTPATIENT)
Dept: ANTICOAGULATION | Facility: CLINIC | Age: 77
End: 2022-05-03
Payer: COMMERCIAL

## 2022-05-03 DIAGNOSIS — I48.19 PERSISTENT ATRIAL FIBRILLATION (H): Primary | ICD-10-CM

## 2022-05-03 LAB — INR HOME MONITORING: 2.4 (ref 2–3)

## 2022-05-03 NOTE — PROGRESS NOTES
ANTICOAGULATION  MANAGEMENT-Home Monitor Managed by Exception    Buck JONES Sinclair 77 year old male is on warfarin with therapeutic INR result. (Goal INR 2.0-3.0)    Recent labs: (last 7 days)     05/03/22  0000   INR 2.40       Previous INR was Therapeutic  Medication, diet, health changes since last INR:chart reviewed; none identified  Contacted within the last 12 weeks by phone on 4/19      LUCAS     Buck was NOT contacted regarding therapeutic result today per home monitoring policy manage by exception agreement.   Current warfarin dose is to be continued:           Fay Lewis, RN  Anticoagulation Clinic  5/3/2022    _______________________________________________________________________     Anticoagulation Episode Summary     Current INR goal:  2.0-3.0   TTR:  86.6 % (1 y)   Target end date:  Indefinite   Send INR reminders to:  LARISA RICHARDS    Indications    Persistent Atrial Fibrillation [I48.91]  Persistent atrial fibrillation (H) [I48.19]           Comments:  Home monitor ( [employer] )managed by exception         Anticoagulation Care Providers     Provider Role Specialty Phone number    Erica Hansen APRN CNP Referring Cardiovascular Disease [phone]    Everett Renee MD  Cardiovascular Disease [phone]

## 2022-05-06 ENCOUNTER — OFFICE VISIT (OUTPATIENT)
Dept: PULMONOLOGY | Facility: OTHER | Age: 77
End: 2022-05-06
Payer: OTHER MISCELLANEOUS

## 2022-05-06 VITALS
BODY MASS INDEX: 35.16 KG/M2 | SYSTOLIC BLOOD PRESSURE: 103 MMHG | DIASTOLIC BLOOD PRESSURE: 69 MMHG | HEART RATE: 74 BPM | WEIGHT: 281.3 LBS | OXYGEN SATURATION: 96 %

## 2022-05-06 DIAGNOSIS — J44.9 COPD, GROUP B, BY GOLD 2017 CLASSIFICATION (H): Primary | ICD-10-CM

## 2022-05-06 PROCEDURE — 99214 OFFICE O/P EST MOD 30 MIN: CPT | Performed by: INTERNAL MEDICINE

## 2022-05-06 NOTE — LETTER
5/6/2022         RE: Buck Sinclair  [address] [place] [address] Dr [place] [address]        Dear Colleague,    Thank you for referring your patient, Buck Sinclair, to the [facility]. Please see a copy of my visit note below.    Pulmonary Clinic Follow-up Visit    Assessment/Plan:  77 year old male with a history of tobacco dependence in remission, CAD s/p PCI/stents, afib s/p PVL with ICD/PPM on anticoagulation, history of cavitary lesion and extensive pneumonia in LLL in Oct 2017, subsequent recurrent LLL pneumonia, since resolved, presenting for follow up. Repeat PFT's in 2020 actually showed improved FEV1/FVC ratio, stable FEV1 but substantial worsening of the DLco (almost 40% lower than in 2018).  Recently he presented with CHF and has been doing better with higher Lasix dose.    Recent RHC data was reviewed and showed mPAP of 37 mm Hg, normal PCWP, CO 5.7 L/min, TPG of 21 and PVR of 4.7 BLAIR. This is consistent with group 1 PH. He could still have pulmonary arteriopathy due to some left sided heart disease as well as chronic lung disease. He is established at the [facility]'s pulmonary hypertension clinic and is doing quite well on the current Tyvaso clinical trial.      Plan:  #COPD, GOLD class B (CAT >10, few exacerbations/low risk for hospitalization). Minimal smoking history so this is probably due to his work as a . PFTs 2017 showed mild obstruction and normal DLco. Mild exertional hypoxemia noted on 6MWT in 2019. Now on supplemental oxygen as of 2020.  - continue budesonide-formoterol 160-4.5 mcg two inhalations BID with spacer; rinse/gargle/spit water after use. No dose changes today.  - continue tiotropium HandiHaler one inhalation daily  - Cont albuterol as needed  - encouraged exercise, weight loss as able  - continue supplemental O2 for goal O2 sat 88-92%   - declines pulmonary rehab  - no indication for LDCT as his smoking history is too minimal.   - UTD w/ pneumonia,  flu and covid-19 vaccines. I am not sure if he got the booster - defer to his PMD.    #exertional hypoxemia, worsening DLco in 2021, significant LIMA: likely due to pulm HTN. RHC studies as above c/w group 1 PH. Likely due to combination of left-sided heart disease and possible valvular disease as well as hypoxemic lung disease (emphysema). Significant worsening of DLco over the last 2 years. He is oxygen dependent.  - continue to follow up with pulmonary HTN at the  (Dr. Jose Morales). Greatly appreciate his expertise.   - continue Lasix 80mg bid per Dr. Garrett  - CHF precautions reinforced.   - continue supplemental oxygen as above     #LLL nodule: decreasing in size on 3 month f/u scan after abx (last scan 2019). 9mm -> 7mm and associated with scarring  - no further imaging needed unless recurrent symptoms     #Hemoptysis in the past: no further recurrence. Likely was due to the prior LLL pneumonia in the setting of anticoagulation.  - continue INR goal close to 2, as discussed with Dr. Renee   - he'll let me know if he has any concerning symptoms.    #nocturnal hypoxemia:  - needs sleep study. Did not have time to address this today.     Follow up in 6 months.    CCx: follow up of COPD GOLD class D, chronic hypoxemic respiratory failure requiring suplpemental oxygen, CHF with volume overload.    HPI: Interim history: I last saw Buck on 2/10.  Since that time, he did establish care with pulmonary HTN at the .   He is in a study for inhaled Tyvaso.    He is doing quite well.   He thinks his breathing is better. His wife is thinks his voice quality has improved since starting the study.  His edema is better.     ROS:  A 12-system review was obtained and was negative with the exception of the symptoms endorsed in the history of present illness.    PMH:  Past Medical History:   Diagnosis Date     Anemia      Asthma without status asthmaticus 5/5/2021     BPH (benign prostatic hyperplasia)      COPD, group B, by  GOLD 2017 classification (H)      Coronary artery disease due to calcified coronary lesion      Dyslipidemia, goal LDL below 70      Essential hypertension      History of cardiomyopathy      History of transfusion      Persistent atrial fibrillation (H)      Pneumonia of left lower lobe due to infectious organism 10/4/2017     Skin cancer of trunk      Status post catheter ablation of atrial fibrillation 6/7/2017    PVI 4-2011 (Cryo/PVI + roof line + CTI line) Re-do PVI 7-2011 (RFA/PVI + CFE + VIDYA + confirmed CTI line)     Ventricular tachycardia (H)        PSH:  Past Surgical History:   Procedure Laterality Date     CARDIAC DEFIBRILLATOR PLACEMENT       CARDIOVERSION  07/11/2018    x20, last 2/12/15, 10/2015, 11/18/16, 6/16/17 by Lauren Foster CNP     CARDIOVERSION  07/11/2018     COLONOSCOPY N/A 4/28/2017    Procedure: COLONOSCOPY with 2 ascending polyps and 1 transverse polyp;  Surgeon: Jose Whittington MD;  Location: [facility] GI;  Service:      CV CORONARY ANGIOGRAM N/A 9/20/2017    Procedure: Coronary Angiogram;  Surgeon: Sergio Cervantes MD;  Location: [facility];  Service:      EP ICD INSERT       FRACTURE SURGERY Left     wrist     INGUINAL HERNIA REPAIR Left 1967    while in the [employer] in [place] after 13 month in [place]     INSERT / REPLACE / REMOVE PACEMAKER       left hand surgery---tendon repair       IA ABLATE HEART DYSRHYTHM FOCUS  04/2011    Catheter Ablation Atrial Fibrillation PVI Apr 2011 (Cryo+RF-PVI + roof line + CTI line)     IA ABLATE HEART DYSRHYTHM FOCUS  07/2011    Re-do PVI Jul 2011 (RFA-PVI + CFE + VIDYA + confirmation of CTI line)     IA MYERS W/O FACETEC FORAMOT/DSKC 1/2 VRT SEG, CERVICAL      Laminectomy Lumbar;  Recorded: 03/09/2012;     TOTAL SHOULDER REPLACEMENT Right 03/03/2016    Dr. Abernathy of [facility]       Allergies:  Allergies   Allergen Reactions     Adhesive Other (See Comments)     ADHESIVE TAPE; SKIN IRRITATION  Foam tape:  Skin removed with tape  removal     Amiodarone      ADVERSE REACTION.  Sunlight sensitivity.     Lisinopril Cough       Family HX:  Family History   Problem Relation Age of Onset     Cancer Mother         leukemia     Cancer Father         bladder     Cancer Sister         breast with lung met.     Aneurysm Sister      CABG Brother      CABG Brother      Valvular heart disease Brother         valve replacement       Social Hx:  Social History     Socioeconomic History     Marital status:      Spouse name: Neela     Number of children: Not on file     Years of education: 12     Highest education level: Not on file   Occupational History     Occupation:      Employer: RETIRED     Occupation:  police     Comment: [place]   Social Needs     Financial resource strain: Not on file     Food insecurity     Worry: Not on file     Inability: Not on file     Transportation needs     Medical: Not on file     Non-medical: Not on file   Tobacco Use     Smoking status: Former Smoker     Packs/day: 1.00     Years: 4.00     Pack years: 4.00     Types: Cigarettes     Quit date: 1968     Years since quittin.3     Smokeless tobacco: Never Used   Substance and Sexual Activity     Alcohol use: Yes     Alcohol/week: 2.0 standard drinks     Types: 2 Cans of beer per week     Comment: 1 beer per week     Drug use: No     Sexual activity: Yes     Partners: Female     Birth control/protection: Post-menopausal   Lifestyle     Physical activity     Days per week: Not on file     Minutes per session: Not on file     Stress: Not on file   Relationships     Social connections     Talks on phone: Not on file     Gets together: Not on file     Attends [employer] service: Not on file     Active member of club or organization: Not on file     Attends meetings of clubs or organizations: Not on file     Relationship status: Not on file     Intimate partner violence     Fear of current or ex partner: Not on file     Emotionally abused: Not on  file     Physically abused: Not on file     Forced sexual activity: Not on file   Other Topics Concern     Not on file   Social History Narrative     Not on file       Current Meds:  Current Outpatient Medications   Medication Sig Dispense Refill     ACETAMINOPHEN (TYLENOL ORAL) Take 1,000 mg by mouth 2 (two) times a day.        albuterol (PROAIR HFA;PROVENTIL HFA;VENTOLIN HFA) 90 mcg/actuation inhaler Inhale 2 puffs every 6 (six) hours as needed for wheezing. 1 Inhaler 11     amoxicillin (AMOXIL) 500 MG tablet Take prior to the Dentist       ascorbic acid (VITAMIN C) 1000 MG tablet Take 1,000 mg by mouth daily.       atorvastatin (LIPITOR) 40 MG tablet Take 40 mg by mouth at bedtime.       budesonide-formoterol (SYMBICORT) 160-4.5 mcg/actuation inhaler Inhale 2 puffs 2 (two) times a day.       co-enzyme Q-10 30 mg capsule Take 100 mg by mouth daily.       furosemide (LASIX) 40 MG tablet Take 1 tablet (40 mg total) by mouth daily. 90 tablet 3     losartan (COZAAR) 50 MG tablet Take 1 tablet (50 mg total) by mouth daily. 90 tablet 1     MAG 64 64 mg TbEC delayed-release tablet TAKE 1 TABLET(64 MG) BY MOUTH TWICE DAILY 180 tablet 1     multivitamin (MULTIVITAMIN) per tablet Take 1 tablet by mouth daily.       omega 3-dha-epa-fish oil (FISH OIL) 1,000 mg (120 mg-180 mg) cap Take 2 tablets by mouth 2 (two) times a day.        OXYGEN-AIR DELIVERY SYSTEMS [facility] Use As Directed. 2 L at rest/night and 3-4L with activity  [facility]       polyethylene glycol (MIRALAX) 17 gram packet Take 17 g by mouth daily.       sotaloL (BETAPACE) 80 MG tablet TAKE 2 TABLETS(160 MG) BY MOUTH TWICE DAILY. 360 tablet 0     tiotropium (SPIRIVA) 18 mcg inhalation capsule Place 18 mcg into inhaler and inhale daily.       VITAMIN D2 50,000 unit capsule Take 50,000 Units by mouth 2 (two) times a week. SUN and WED  3     warfarin ANTICOAGULANT (COUMADIN/JANTOVEN) 2.5 MG tablet Takes 1 tablet (2.5mg) to 2 tablets (5mg) by mouth daily, as directed.   Adjust dose based on INR results. 130 tablet 1     No current facility-administered medications for this visit.        Physical Exam:  There were no vitals taken for this visit.Gen: alert, oriented, no distress  HEENT: nasal turbinates are unremarkable, no oropharyngeal lesions, no cervical or supraclavicular lymphadenopathy  CV: irreg irreg, no M/G/R  Resp: clear lungs.  Abd: soft, nontender, no palpable organomegaly  Skin: no apparent rashes  Ext: 1+ pitting edema. Sig improvement from last visit.   Neuro: alert, nonfocal    Labs:  Reviewed  Dec 2018  Chem panel wnl  TSH wnl  hgb 12.1 Oct 2018    Previous testing  DONNA neg  blasto neg  Histo testing neg  c-ANCA neg  HIV neg  RF neg    Bronch/LLL BAL cultures neg    Imaging studies:  CT chest April 2018  IMPRESSION:   CONCLUSION:  1.  Mild scarring and slight bronchiectasis present at both lung bases. No active pneumonia identified.    CT chest 7/15/2019  IMPRESSION:   CONCLUSION:   1.  Nodular opacity of the left lower lobe of interest on 04/17/2019 is less conspicuous today and probably explained by scarring. Additional 6 month chest CT follow-up is recommended.  2.  Emphysema and scattered areas of scarring elsewhere in both lungs.  3.  Advanced multivessel coronary artery disease.    Echo Dec 2021  Interpretation Summary     1. Left ventricular systolic function is normal. The left ventricle is normal  in size. There is normal left ventricular wall thickness.Left ventricular  diastolic function is normal. No regional wall motion abnormalities noted.  2. The right ventricle is mildly dilated. Mildly decreased right ventricular  systolic functio.  3. There is mild to moderate (1-2+) tricuspid regurgitation.  4. The right ventricular systolic pressure is approximated at 45.7mmHg plus  the right atrial pressure.Right ventricular systolic pressure is elevated,  consistent with moderate pulmonary hypertension. Normal RA pressure of 3 mmHg.  6. The ascending aorta is  mildly dilated at 42 mm.    RHC 1/24/2022    Exertional dyspnea in a patient with known CAD and severe COPD.    Mild coronary artery disease with patent LAD stents.    LVEDP and PCWP are normal. The PA pressure was elevated at 66/24 mmHg with a mean pressure of 37 mmHg. See numbers below.    Shikha and TD cardiac outputs were both 5.7 l/min.    Sats on 2L NC oxygen: Ao 93%, PA 60%, RA 59%        PFTs 2018  FEV1/FVC is 0.56 and is reduced.  FEV1 is 73% predicted and is reduced.  FVC is 96% predicted and normal.  There was no improvement in spirometry after a single inhaled dose of bronchodilator.  TLC is 99% predicted and is normal.  RV is 113% predicted and is normal.  DLCO is 93% predicted and is normal when it   is corrected for hemoglobin.     Impression:  Full Pulmonary Function Test is abnormal.  PFTs are consistent with mild obstructive disease.  Spirometry is not consistent with reversibility.  There is no hyperinflation.  There is no air-trapping.  Diffusion capacity when corrected for hemoglobin is normal.  Declines in both FEV1 and TLC since 2009 with overall preservation of diffusing capacity.    PFTs 11/10/2021  FEV1 2.42L 68%  FVC 76%  No BD response  Ratio 0.66 (LLN 0.6)  DLco 46% han for hgb  Flow vol curve suggests obstruction.  Impression: no obstruction based on ratio >LLN but FV curve does suggest some obstruction. Significant decline in DLco compared to DLco.     July 2019  SIX MINUTE WALK TEST / HOME O2 EVALUATION     SpO2 at rest on RA 96%  SpO2 started to drop after 2 1/2 minutes walking. SpO2 after walking 2 1/2 minutes on RA was 88%  SpO2 after walking 6 minutes on RA was 87%  Distance covered 320.04 meters.  Recovery phase, SpO2 after 1 minute rest on RA was 87%  Recovery phase, SpO2 after 2 minute rest on RA was 97%     Impression:   Significant desaturation with activities.   Patient does qualify for O2 supplementation with activity.    Hugh Payton MD (Avi)  [facility] Pulmonary &  Critical Care  Pager [phone]  Clinic [phone]            Again, thank you for allowing me to participate in the care of your patient.        Sincerely,        Hugh Payton MD

## 2022-05-06 NOTE — PROGRESS NOTES
Pulmonary Clinic Follow-up Visit    Assessment/Plan:  77 year old male with a history of tobacco dependence in remission, CAD s/p PCI/stents, afib s/p PVL with ICD/PPM on anticoagulation, history of cavitary lesion and extensive pneumonia in LLL in Oct 2017, subsequent recurrent LLL pneumonia, since resolved, presenting for follow up. Repeat PFT's in 2020 actually showed improved FEV1/FVC ratio, stable FEV1 but substantial worsening of the DLco (almost 40% lower than in 2018).  Recently he presented with CHF and has been doing better with higher Lasix dose.    Recent RHC data was reviewed and showed mPAP of 37 mm Hg, normal PCWP, CO 5.7 L/min, TPG of 21 and PVR of 4.7 BLAIR. This is consistent with group 1 PH. He could still have pulmonary arteriopathy due to some left sided heart disease as well as chronic lung disease. He is established at the [facility]'s pulmonary hypertension clinic and is doing quite well on the current Tyvaso clinical trial.      Plan:  #COPD, GOLD class B (CAT >10, few exacerbations/low risk for hospitalization). Minimal smoking history so this is probably due to his work as a . PFTs 2017 showed mild obstruction and normal DLco. Mild exertional hypoxemia noted on 6MWT in 2019. Now on supplemental oxygen as of 2020.  - continue budesonide-formoterol 160-4.5 mcg two inhalations BID with spacer; rinse/gargle/spit water after use. No dose changes today.  - continue tiotropium HandiHaler one inhalation daily  - Cont albuterol as needed  - encouraged exercise, weight loss as able  - continue supplemental O2 for goal O2 sat 88-92%   - declines pulmonary rehab  - no indication for LDCT as his smoking history is too minimal.   - UTD w/ pneumonia, flu and covid-19 vaccines. I am not sure if he got the booster - defer to his PMD.    #exertional hypoxemia, worsening DLco in 2021, significant LIMA: likely due to pulm HTN. RHC studies as above c/w group 1 PH. Likely due to combination of left-sided heart  disease and possible valvular disease as well as hypoxemic lung disease (emphysema). Significant worsening of DLco over the last 2 years. He is oxygen dependent.  - continue to follow up with pulmonary HTN at the  (Dr. Jose Morales). Greatly appreciate his expertise.   - continue Lasix 80mg bid per Dr. Garrett  - CHF precautions reinforced.   - continue supplemental oxygen as above     #LLL nodule: decreasing in size on 3 month f/u scan after abx (last scan 2019). 9mm -> 7mm and associated with scarring  - no further imaging needed unless recurrent symptoms     #Hemoptysis in the past: no further recurrence. Likely was due to the prior LLL pneumonia in the setting of anticoagulation.  - continue INR goal close to 2, as discussed with Dr. Renee   - he'll let me know if he has any concerning symptoms.    #nocturnal hypoxemia:  - needs sleep study. Did not have time to address this today.     Follow up in 6 months.    CCx: follow up of COPD GOLD class D, chronic hypoxemic respiratory failure requiring suplpemental oxygen, CHF with volume overload.    HPI: Interim history: I last saw Buck on 2/10.  Since that time, he did establish care with pulmonary HTN at the .   He is in a study for inhaled Tyvaso.    He is doing quite well.   He thinks his breathing is better. His wife is thinks his voice quality has improved since starting the study.  His edema is better.     ROS:  A 12-system review was obtained and was negative with the exception of the symptoms endorsed in the history of present illness.    PMH:  Past Medical History:   Diagnosis Date     Anemia      Asthma without status asthmaticus 5/5/2021     BPH (benign prostatic hyperplasia)      COPD, group B, by GOLD 2017 classification (H)      Coronary artery disease due to calcified coronary lesion      Dyslipidemia, goal LDL below 70      Essential hypertension      History of cardiomyopathy      History of transfusion      Persistent atrial fibrillation (H)       Pneumonia of left lower lobe due to infectious organism 10/4/2017     Skin cancer of trunk      Status post catheter ablation of atrial fibrillation 6/7/2017    PVI 4-2011 (Cryo/PVI + roof line + CTI line) Re-do PVI 7-2011 (RFA/PVI + CFE + VIDYA + confirmed CTI line)     Ventricular tachycardia (H)        PSH:  Past Surgical History:   Procedure Laterality Date     CARDIAC DEFIBRILLATOR PLACEMENT       CARDIOVERSION  07/11/2018    x20, last 2/12/15, 10/2015, 11/18/16, 6/16/17 by Lauren Foster CNP     CARDIOVERSION  07/11/2018     COLONOSCOPY N/A 4/28/2017    Procedure: COLONOSCOPY with 2 ascending polyps and 1 transverse polyp;  Surgeon: Jose Whittington MD;  Location: [facility] GI;  Service:      CV CORONARY ANGIOGRAM N/A 9/20/2017    Procedure: Coronary Angiogram;  Surgeon: Sergio Cervantes MD;  Location: [facility] Cath Lab;  Service:      EP ICD INSERT       FRACTURE SURGERY Left     wrist     INGUINAL HERNIA REPAIR Left 1967    while in the [employer] in [place] after 13 month in [place]     INSERT / REPLACE / REMOVE PACEMAKER       left hand surgery---tendon repair       MT ABLATE HEART DYSRHYTHM FOCUS  04/2011    Catheter Ablation Atrial Fibrillation PVI Apr 2011 (Cryo+RF-PVI + roof line + CTI line)     MT ABLATE HEART DYSRHYTHM FOCUS  07/2011    Re-do PVI Jul 2011 (RFA-PVI + CFE + VIDYA + confirmation of CTI line)     MT MYERS W/O FACETEC FORAMOT/DSKC 1/2 VRT SEG, CERVICAL      Laminectomy Lumbar;  Recorded: 03/09/2012;     TOTAL SHOULDER REPLACEMENT Right 03/03/2016    Dr. Abernathy of [facility]       Allergies:  Allergies   Allergen Reactions     Adhesive Other (See Comments)     ADHESIVE TAPE; SKIN IRRITATION  Foam tape:  Skin removed with tape removal     Amiodarone      ADVERSE REACTION.  Sunlight sensitivity.     Lisinopril Cough       Family HX:  Family History   Problem Relation Age of Onset     Cancer Mother         leukemia     Cancer Father         bladder     Cancer Sister         breast with  lung met.     Aneurysm Sister      CABG Brother      CABG Brother      Valvular heart disease Brother         valve replacement       Social Hx:  Social History     Socioeconomic History     Marital status:      Spouse name: Neela     Number of children: Not on file     Years of education: 12     Highest education level: Not on file   Occupational History     Occupation:      Employer: RETIRED     Occupation: [employer]     Comment: [place]   Social Needs     Financial resource strain: Not on file     Food insecurity     Worry: Not on file     Inability: Not on file     Transportation needs     Medical: Not on file     Non-medical: Not on file   Tobacco Use     Smoking status: Former Smoker     Packs/day: 1.00     Years: 4.00     Pack years: 4.00     Types: Cigarettes     Quit date: 1968     Years since quittin.3     Smokeless tobacco: Never Used   Substance and Sexual Activity     Alcohol use: Yes     Alcohol/week: 2.0 standard drinks     Types: 2 Cans of beer per week     Comment: 1 beer per week     Drug use: No     Sexual activity: Yes     Partners: Female     Birth control/protection: Post-menopausal   Lifestyle     Physical activity     Days per week: Not on file     Minutes per session: Not on file     Stress: Not on file   Relationships     Social connections     Talks on phone: Not on file     Gets together: Not on file     Attends Latter day service: Not on file     Active member of club or organization: Not on file     Attends meetings of clubs or organizations: Not on file     Relationship status: Not on file     Intimate partner violence     Fear of current or ex partner: Not on file     Emotionally abused: Not on file     Physically abused: Not on file     Forced sexual activity: Not on file   Other Topics Concern     Not on file   Social History Narrative     Not on file       Current Meds:  Current Outpatient Medications   Medication Sig Dispense Refill      ACETAMINOPHEN (TYLENOL ORAL) Take 1,000 mg by mouth 2 (two) times a day.        albuterol (PROAIR HFA;PROVENTIL HFA;VENTOLIN HFA) 90 mcg/actuation inhaler Inhale 2 puffs every 6 (six) hours as needed for wheezing. 1 Inhaler 11     amoxicillin (AMOXIL) 500 MG tablet Take prior to the Dentist       ascorbic acid (VITAMIN C) 1000 MG tablet Take 1,000 mg by mouth daily.       atorvastatin (LIPITOR) 40 MG tablet Take 40 mg by mouth at bedtime.       budesonide-formoterol (SYMBICORT) 160-4.5 mcg/actuation inhaler Inhale 2 puffs 2 (two) times a day.       co-enzyme Q-10 30 mg capsule Take 100 mg by mouth daily.       furosemide (LASIX) 40 MG tablet Take 1 tablet (40 mg total) by mouth daily. 90 tablet 3     losartan (COZAAR) 50 MG tablet Take 1 tablet (50 mg total) by mouth daily. 90 tablet 1     MAG 64 64 mg TbEC delayed-release tablet TAKE 1 TABLET(64 MG) BY MOUTH TWICE DAILY 180 tablet 1     multivitamin (MULTIVITAMIN) per tablet Take 1 tablet by mouth daily.       omega 3-dha-epa-fish oil (FISH OIL) 1,000 mg (120 mg-180 mg) cap Take 2 tablets by mouth 2 (two) times a day.        OXYGEN-AIR DELIVERY SYSTEMS [facility] Use As Directed. 2 L at rest/night and 3-4L with activity  [employer]       polyethylene glycol (MIRALAX) 17 gram packet Take 17 g by mouth daily.       sotaloL (BETAPACE) 80 MG tablet TAKE 2 TABLETS(160 MG) BY MOUTH TWICE DAILY. 360 tablet 0     tiotropium (SPIRIVA) 18 mcg inhalation capsule Place 18 mcg into inhaler and inhale daily.       VITAMIN D2 50,000 unit capsule Take 50,000 Units by mouth 2 (two) times a week. SUN and WED  3     warfarin ANTICOAGULANT (COUMADIN/JANTOVEN) 2.5 MG tablet Takes 1 tablet (2.5mg) to 2 tablets (5mg) by mouth daily, as directed.  Adjust dose based on INR results. 130 tablet 1     No current facility-administered medications for this visit.        Physical Exam:  There were no vitals taken for this visit.Gen: alert, oriented, no distress  HEENT: nasal turbinates are unremarkable, no  oropharyngeal lesions, no cervical or supraclavicular lymphadenopathy  CV: irreg irreg, no M/G/R  Resp: clear lungs.  Abd: soft, nontender, no palpable organomegaly  Skin: no apparent rashes  Ext: 1+ pitting edema. Sig improvement from last visit.   Neuro: alert, nonfocal    Labs:  Reviewed  Dec 2018  Chem panel wnl  TSH wnl  hgb 12.1 Oct 2018    Previous testing  DONNA neg  blasto neg  Histo testing neg  c-ANCA neg  HIV neg  RF neg    Bronch/LLL BAL cultures neg    Imaging studies:  CT chest April 2018  IMPRESSION:   CONCLUSION:  1.  Mild scarring and slight bronchiectasis present at both lung bases. No active pneumonia identified.    CT chest 7/15/2019  IMPRESSION:   CONCLUSION:   1.  Nodular opacity of the left lower lobe of interest on 04/17/2019 is less conspicuous today and probably explained by scarring. Additional 6 month chest CT follow-up is recommended.  2.  Emphysema and scattered areas of scarring elsewhere in both lungs.  3.  Advanced multivessel coronary artery disease.    Echo Dec 2021  Interpretation Summary     1. Left ventricular systolic function is normal. The left ventricle is normal  in size. There is normal left ventricular wall thickness.Left ventricular  diastolic function is normal. No regional wall motion abnormalities noted.  2. The right ventricle is mildly dilated. Mildly decreased right ventricular  systolic functio.  3. There is mild to moderate (1-2+) tricuspid regurgitation.  4. The right ventricular systolic pressure is approximated at 45.7mmHg plus  the right atrial pressure.Right ventricular systolic pressure is elevated,  consistent with moderate pulmonary hypertension. Normal RA pressure of 3 mmHg.  6. The ascending aorta is mildly dilated at 42 mm.    RHC 1/24/2022    Exertional dyspnea in a patient with known CAD and severe COPD.    Mild coronary artery disease with patent LAD stents.    LVEDP and PCWP are normal. The PA pressure was elevated at 66/24 mmHg with a mean pressure  of 37 mmHg. See numbers below.    Shikha and TD cardiac outputs were both 5.7 l/min.    Sats on 2L NC oxygen: Ao 93%, PA 60%, RA 59%        PFTs 2018  FEV1/FVC is 0.56 and is reduced.  FEV1 is 73% predicted and is reduced.  FVC is 96% predicted and normal.  There was no improvement in spirometry after a single inhaled dose of bronchodilator.  TLC is 99% predicted and is normal.  RV is 113% predicted and is normal.  DLCO is 93% predicted and is normal when it   is corrected for hemoglobin.     Impression:  Full Pulmonary Function Test is abnormal.  PFTs are consistent with mild obstructive disease.  Spirometry is not consistent with reversibility.  There is no hyperinflation.  There is no air-trapping.  Diffusion capacity when corrected for hemoglobin is normal.  Declines in both FEV1 and TLC since 2009 with overall preservation of diffusing capacity.    PFTs 11/10/2021  FEV1 2.42L 68%  FVC 76%  No BD response  Ratio 0.66 (LLN 0.6)  DLco 46% han for hgb  Flow vol curve suggests obstruction.  Impression: no obstruction based on ratio >LLN but FV curve does suggest some obstruction. Significant decline in DLco compared to DLco.     July 2019  SIX MINUTE WALK TEST / HOME O2 EVALUATION     SpO2 at rest on RA 96%  SpO2 started to drop after 2 1/2 minutes walking. SpO2 after walking 2 1/2 minutes on RA was 88%  SpO2 after walking 6 minutes on RA was 87%  Distance covered 320.04 meters.  Recovery phase, SpO2 after 1 minute rest on RA was 87%  Recovery phase, SpO2 after 2 minute rest on RA was 97%     Impression:   Significant desaturation with activities.   Patient does qualify for O2 supplementation with activity.    Hugh Payton MD (Avi)  [facility]  Pager [phone]  Clinic [phone]

## 2022-05-10 ENCOUNTER — DOCUMENTATION ONLY (OUTPATIENT)
Dept: ANTICOAGULATION | Facility: CLINIC | Age: 77
End: 2022-05-10
Payer: COMMERCIAL

## 2022-05-10 DIAGNOSIS — I48.19 PERSISTENT ATRIAL FIBRILLATION (H): Primary | ICD-10-CM

## 2022-05-10 LAB — INR HOME MONITORING: 2.4 (ref 2–3)

## 2022-05-10 NOTE — PROGRESS NOTES
ANTICOAGULATION  MANAGEMENT-Home Monitor Managed by Exception    Buck JONES Sinclair 77 year old male is on warfarin with therapeutic INR result. (Goal INR 2.0-3.0)    Recent labs: (last 7 days)     05/10/22  0000   INR 2.40       Previous INR was Therapeutic  Medication, diet, health changes since last INR:chart reviewed; none identified  Contacted within the last 12 weeks by phone on 4/19/22      LUCAS     Buck was NOT contacted regarding therapeutic result today per home monitoring policy manage by exception agreement.   Current warfarin dose is to be continued:     Summary  As of 5/10/2022    Full warfarin instructions:  5 mg every Mon, Thu; 2.5 mg all other days   Next INR check:  5/17/2022               Leana Nayak RN  Anticoagulation Clinic  5/10/2022    _______________________________________________________________________     Anticoagulation Episode Summary     Current INR goal:  2.0-3.0   TTR:  86.6 % (1 y)   Target end date:  Indefinite   Send INR reminders to:  LARISA RICHARDS    Indications    Persistent Atrial Fibrillation [I48.91]  Persistent atrial fibrillation (H) [I48.19]           Comments:  Home monitor ( [employer] )managed by exception         Anticoagulation Care Providers     Provider Role Specialty Phone number    Erica Hansen APRN CNP Referring Cardiovascular Disease [phone]    Everett Renee MD  Cardiovascular Disease [phone]

## 2022-05-17 ENCOUNTER — OFFICE VISIT (OUTPATIENT)
Dept: CARDIOLOGY | Facility: CLINIC | Age: 77
End: 2022-05-17
Payer: OTHER MISCELLANEOUS

## 2022-05-17 ENCOUNTER — LAB (OUTPATIENT)
Dept: LAB | Facility: CLINIC | Age: 77
End: 2022-05-17
Payer: COMMERCIAL

## 2022-05-17 ENCOUNTER — DOCUMENTATION ONLY (OUTPATIENT)
Dept: ANTICOAGULATION | Facility: CLINIC | Age: 77
End: 2022-05-17

## 2022-05-17 ENCOUNTER — APPOINTMENT (OUTPATIENT)
Dept: CARDIOLOGY | Facility: CLINIC | Age: 77
End: 2022-05-17
Attending: INTERNAL MEDICINE

## 2022-05-17 VITALS
WEIGHT: 279 LBS | RESPIRATION RATE: 16 BRPM | DIASTOLIC BLOOD PRESSURE: 64 MMHG | BODY MASS INDEX: 34.87 KG/M2 | SYSTOLIC BLOOD PRESSURE: 92 MMHG | HEART RATE: 72 BPM

## 2022-05-17 DIAGNOSIS — I10 ESSENTIAL HYPERTENSION: ICD-10-CM

## 2022-05-17 DIAGNOSIS — Z95.810 DUAL ICD (IMPLANTABLE CARDIOVERTER-DEFIBRILLATOR) IN PLACE: ICD-10-CM

## 2022-05-17 DIAGNOSIS — I27.20 PULMONARY HYPERTENSION (H): ICD-10-CM

## 2022-05-17 DIAGNOSIS — I48.19 PERSISTENT ATRIAL FIBRILLATION (H): ICD-10-CM

## 2022-05-17 DIAGNOSIS — Z00.6 EXAMINATION OF PARTICIPANT OR CONTROL IN CLINICAL RESEARCH: ICD-10-CM

## 2022-05-17 DIAGNOSIS — J44.9 CHRONIC OBSTRUCTIVE PULMONARY DISEASE, UNSPECIFIED COPD TYPE (H): ICD-10-CM

## 2022-05-17 DIAGNOSIS — I25.10 CORONARY ARTERY DISEASE INVOLVING NATIVE CORONARY ARTERY OF NATIVE HEART WITHOUT ANGINA PECTORIS: ICD-10-CM

## 2022-05-17 DIAGNOSIS — T46.2X5D ADVERSE EFFECT OF AMIODARONE, SUBSEQUENT ENCOUNTER: ICD-10-CM

## 2022-05-17 DIAGNOSIS — R06.09 DOE (DYSPNEA ON EXERTION): ICD-10-CM

## 2022-05-17 DIAGNOSIS — Z11.59 ENCOUNTER FOR SCREENING FOR OTHER VIRAL DISEASES: ICD-10-CM

## 2022-05-17 DIAGNOSIS — I48.91 ATRIAL FIBRILLATION (H): Primary | ICD-10-CM

## 2022-05-17 DIAGNOSIS — I50.812 CHRONIC RIGHT-SIDED HEART FAILURE (H): ICD-10-CM

## 2022-05-17 DIAGNOSIS — I27.21 PAH (PULMONARY ARTERY HYPERTENSION) (H): ICD-10-CM

## 2022-05-17 DIAGNOSIS — E78.5 HYPERLIPIDEMIA, UNSPECIFIED HYPERLIPIDEMIA TYPE: ICD-10-CM

## 2022-05-17 DIAGNOSIS — I50.32 CHRONIC HEART FAILURE WITH PRESERVED EJECTION FRACTION (H): Primary | ICD-10-CM

## 2022-05-17 LAB
ANION GAP SERPL CALCULATED.3IONS-SCNC: 8 MMOL/L (ref 3–14)
BUN SERPL-MCNC: 41 MG/DL (ref 7–30)
CALCIUM SERPL-MCNC: 9.1 MG/DL (ref 8.5–10.1)
CHLORIDE BLD-SCNC: 107 MMOL/L (ref 94–109)
CO2 SERPL-SCNC: 27 MMOL/L (ref 20–32)
CREAT SERPL-MCNC: 1.58 MG/DL (ref 0.66–1.25)
ERYTHROCYTE [DISTWIDTH] IN BLOOD BY AUTOMATED COUNT: 14.2 % (ref 10–15)
GFR SERPL CREATININE-BSD FRML MDRD: 45 ML/MIN/1.73M2
GLUCOSE BLD-MCNC: 108 MG/DL (ref 70–99)
HCT VFR BLD AUTO: 33.2 % (ref 40–53)
HGB BLD-MCNC: 10.2 G/DL (ref 13.3–17.7)
INR HOME MONITORING: 2.5 (ref 2–3)
MCH RBC QN AUTO: 31.3 PG (ref 26.5–33)
MCHC RBC AUTO-ENTMCNC: 30.7 G/DL (ref 31.5–36.5)
MCV RBC AUTO: 102 FL (ref 78–100)
NT-PROBNP SERPL-MCNC: 2976 PG/ML (ref 0–1800)
PLATELET # BLD AUTO: 155 10E3/UL (ref 150–450)
POTASSIUM BLD-SCNC: 4.3 MMOL/L (ref 3.4–5.3)
RBC # BLD AUTO: 3.26 10E6/UL (ref 4.4–5.9)
SODIUM SERPL-SCNC: 142 MMOL/L (ref 133–144)
WBC # BLD AUTO: 7.9 10E3/UL (ref 4–11)

## 2022-05-17 PROCEDURE — 83880 ASSAY OF NATRIURETIC PEPTIDE: CPT | Performed by: PATHOLOGY

## 2022-05-17 PROCEDURE — 94729 DIFFUSING CAPACITY: CPT | Performed by: INTERNAL MEDICINE

## 2022-05-17 PROCEDURE — 85027 COMPLETE CBC AUTOMATED: CPT | Performed by: PATHOLOGY

## 2022-05-17 PROCEDURE — 99214 OFFICE O/P EST MOD 30 MIN: CPT | Performed by: GENERAL ACUTE CARE HOSPITAL

## 2022-05-17 PROCEDURE — 94726 PLETHYSMOGRAPHY LUNG VOLUMES: CPT | Performed by: INTERNAL MEDICINE

## 2022-05-17 PROCEDURE — 36415 COLL VENOUS BLD VENIPUNCTURE: CPT | Performed by: PATHOLOGY

## 2022-05-17 PROCEDURE — 94375 RESPIRATORY FLOW VOLUME LOOP: CPT | Performed by: INTERNAL MEDICINE

## 2022-05-17 PROCEDURE — 80048 BASIC METABOLIC PNL TOTAL CA: CPT | Performed by: PATHOLOGY

## 2022-05-17 NOTE — PROGRESS NOTES
HEART CARE ENCOUNTER NOTE        Assessment/Recommendations   Assessment:    Chronic congestive heart failure with reduced left ventricular ejection fraction with left ventricular ejection fraction improved to 50% due to nonischemic dilated cardiomyopathy (out of proportion to his degree of coronary artery disease). NYHA class III. Cardiac catheterization on 1/24/2022 showed normal left-sided filling pressures although he appears a bit fluid-overloaded today.  Coronary artery disease status post drug-eluting stenting x3 to the ymyskias-mrstuxm-ahsypj left anterior descending artery on 9/20/2017. Coronary angiography on 1/24/2022 showed patent stents and no other significant coronary artery disease. Denies angina.  Moderate-to-severe pulmonary hypertension. 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  Medtronic dual-chamber implantable cardiac defibrillator placement on 6/11/2014 for primary prevention of sudden cardiac death.  History of Medtronic dual-chamber permanent pacemaker placement in 1999 with generator replacement in 2005 and device removal in 2014.  Persistent atrial fibrillation status post multiple electrical cardioversions, most recently on 11/19/2021. He underwent complex catheter ablation x2 in 2011. Overall fairly low burden of atrial arrhythmia based on his device interrogations. [id number] score is at least 5.  Benign essential hypertension. Controlled.  Hyperlipidemia. Last LDL 55 mg/dL.  Chronic obstructive pulmonary disease on home oxygen.  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    Plan:  We discussed possibly increasing his dose of furosemide, but he would like to keep the dose at 80 mg twice daily for now.  Continue sotalol 160 mg twice daily.  Anticoagulation with warfarin goal INR 2-3.  Losartan 50 mg daily.  Atorvastatin 40 mg daily.  He follows in pulmonology clinic with Dr. Payton and pulmonary hypertension clinic with Dr. Morales.  Follows in device clinic.  Follow-up with  me in 6 months.         History of Present Illness   Mr. Buck Sinclair is a 77 year old male with a significant past history of HFrEF due to nonischemic cardiomyopathy (out of proportion to CAD) with LVEF most recently noted to be 50%, persistent AF s/p multiple electrical cardioversions and extensive catheter ablations x2 in 2011, CAD s/p CANDELARIA x3 to the [id number] LAD, COPD on home O2, Medtronic dual-chamber permanent pacemaker placement in 1999 replaced with a Medtronic dual-chamber implantable cardiac defibrillator on 6/11/2014, PHTN, HTN, and HLD presenting for follow-up.    He is being followed in pulmonology by Dr. Payton and pulmonary hypertension clinic by Dr. Morales. His PHTN is felt to be primarily group III (due to lung disease) and he is current enrolled in a clinical trial investigating inhaled Tyvaso. He feels he is a bit better on the treatment.    He is currently using 6L O2 and gets out of breath with minimal exertion. He has gained a few pounds but has not been aware of any lower extremity swelling, chest pain/pressure/tightness, light headedness/dizziness, pre-syncope, syncope, lower extremity swelling, palpitations, paroxysmal nocturnal dyspnea (PND), or orthopnea.     Cardiac Problems and Cardiac Diagnostics     Most Recent Cardiac testing:  ECG dated 1/24/2022 (personaly reviewed and interpreted): atrial-paced, right bundle branch block QRS duration 126 ms, inferior infarct     Device check 2/18/2022 (report reviewed):  Type: In-clinic annual ICD check (done by [employer] Dimas) and follow-up with Dr. Renee  Presenting: APVS 66bpm, AP 92%,  1.8%  Lead/Battery Status: Stable lead and battery measurements. Estimating 7 months remaining.  Atrial Arrhythmias: 108 mode switches, 8 EGM's suggesting an atrial tachy arrhythmia, longest episode lasting ~ 23 mins on 2/17/22, V-rates >/= 120 ~ 5 %, total burden 0.6%.  Vent Arrhythmias: None  Anticoagulant: Warfarin  Comments: Normal  device function. Demonstrated alert tones to patient and wife who were able to hear tones. OptiVol at 0/baseline. Thoracic impedance and reference are trending together. RA Amplitude changed from 1.75V to 2.0V and RA Amplitude Minimum Adapted changed from 1.5V to 2.0V. AT/AF Daily [place] turned ON. Per Dr. Renee Atrial ATP programmed ON per [facility] Study protocol. Dr. Renee updated on device finding prior to visit.     ECHO 12/8/2021 (report reviewed):   1. Left ventricular systolic function is normal. The left ventricle is normal  in size. There is normal left ventricular wall thickness.Left ventricular  diastolic function is normal. No regional wall motion abnormalities noted.  2. The right ventricle is mildly dilated. Mildly decreased right ventricular  systolic functio.  3. There is mild to moderate (1-2+) tricuspid regurgitation.  4. The right ventricular systolic pressure is approximated at 45.7mmHg plus  the right atrial pressure.Right ventricular systolic pressure is elevated,  consistent with moderate pulmonary hypertension. Normal RA pressure of 3 mmHg.  6. The ascending aorta is mildly dilated at 42 mm.     Stress test 11/7/2019 (report reviewed):     The nuclear stress test is abnormal.     There is a small area of nontransmural infarction in the apical segment(s) of the left ventricle.     The left ventricular ejection fraction at stress is 63%.     A prior study was conducted on 7/19/2018.  This study has changes noted when compared with the prior study. The apical defect no longer demonstrates any ischemia.     Cardiac cath 1/24/2022 (report reviewed):   Mild coronary artery disease with patent LAD stents.  LVEDP and PCWP are normal. The PA pressure was elevated at 66/24 mmHg with a mean pressure of 37 mmHg. See numbers below.  Shikha and TD cardiac outputs were both 5.7 l/min.  Sats on 2L NC oxygen: Ao 93%, PA 60%, RA 59%     Medications  Allergies   Current Outpatient Medications   Medication Sig  "Dispense Refill     acetaminophen (TYLENOL) 325 MG tablet Take 650 mg by mouth 2 times daily       albuterol (PROAIR HFA/PROVENTIL HFA/VENTOLIN HFA) 108 (90 Base) MCG/ACT inhaler Inhale 2 puffs into the lungs every 4 hours as needed for shortness of breath / dyspnea or wheezing       Ascorbic Acid (VITAMIN C) 500 MG CAPS Take 1,000 mg by mouth daily       atorvastatin (LIPITOR) 40 MG tablet Take 40 mg by mouth daily       budesonide-formoterol (SYMBICORT) 160-4.5 MCG/ACT Inhaler Inhale 2 puffs into the lungs 2 times daily       co-enzyme Q-10 100 MG CAPS capsule Take 2 capsules (200 mg) by mouth daily 2 capsule      fish oil-omega-3 fatty acids 1000 MG capsule Take 1 g by mouth 2 times daily       FLUoxetine (PROZAC) 20 MG capsule TAKE 1 CAPSULE BY MOUTH EVERY DAY       furosemide (LASIX) 80 MG tablet Take 1 tablet (80 mg) by mouth 2 times daily 90 tablet 6     losartan (COZAAR) 50 MG tablet Take 1 tablet (50 mg) by mouth daily 90 tablet 3     magnesium chloride 535 (64 Mg) MG TBEC CR tablet Take 64 mg by mouth 2 times daily       multivitamin, therapeutic (THERA-VIT) TABS tablet Take 1 tablet by mouth daily       nitroGLYcerin (NITROSTAT) 0.4 MG sublingual tablet One tablet under the tongue every 5 minutes if needed for chest pain. May repeat every 5 minutes for a maximum of 3 doses in 15 minutes\" 25 tablet 3     OXYGEN-HELIUM IN 4-5 L        polyethylene glycol (MIRALAX) 17 GM/Dose powder Take 17 g by mouth daily        sotalol (BETAPACE) 160 MG tablet Take 1 tablet (160 mg) by mouth 2 times daily 180 tablet 3     study - treprostinil vs. placebo, IDS# [id number], 0.6 MG/ML neb solution Inhale 3 Breaths (18 mcg) into the lungs 4 times daily       tiotropium (SPIRIVA) 18 MCG inhaled capsule Inhale 18 mcg into the lungs daily       vitamin D2 (ERGOCALCIFEROL) 80582 units (1250 mcg) capsule TAKE 1 CAPSULE BY MOUTH 2 TIMES A WEEK       warfarin ANTICOAGULANT (COUMADIN) 2.5 MG tablet Take 1 to 2 tablets (2.5 - 5 mg) daily by " mouth. Adjust dose based on INR results as directed. 130 tablet 1      Allergies   Allergen Reactions     Adhesive Tape Other (See Comments)     ADHESIVE TAPE; SKIN IRRITATION; Skin pulled off with foam tape       Amiodarone      ADVERSE REACTION.  Sunlight sensitivity.     Lisinopril         Physical Examination Review of Systems   BP 92/64 (BP Location: Left arm, Patient Position: Sitting, Cuff Size: Adult Large)   Pulse 72   Resp 16   Wt 126.6 kg (279 lb)   BMI 34.87 kg/m    Body mass index is 34.87 kg/m .  Wt Readings from Last 3 Encounters:   05/17/22 126.6 kg (279 lb)   05/06/22 127.6 kg (281 lb 4.8 oz)   03/15/22 123.7 kg (272 lb 9.6 oz)       General Appearance:   Pleasant  male, appears  stated age. no acute distress, obese body habitus   ENT/Mouth: Facemask.     EYES:  no scleral icterus, normal conjunctivae   Neck: no carotid bruits. No anterior cervical lymphadenopaty   Respiratory:   lungs are clear to auscultation, no rales or wheezing, equal chest wall expansion    Cardiovascular:   Regular rhythm, normal rate. Normal first and second heart sounds with no murmurs, rubs, or gallops; the carotid, radial and posterior tibial pulses are intact, Jugular venous pressure not well seen, 1+ pitting lower extremity edema bilaterally    Abdomen/GI:  no organomegaly, masses, bruits, or tenderness; bowel sounds are present   Extremities: no cyanosis or clubbing   Skin: no xanthelasma, warm.    Heme/lymph/ Immunology No apparent bleeding noted.   Neurologic: Alert and oriented. normal gait, no tremors     Psychiatric: Pleasant, calm, appropriate affect.    A complete 10 system review of systems was performed and is negative except as mentioned in the HPI/subjective.         Past History   Past Medical History:   Past Medical History:   Diagnosis Date     Anemia      Asthma without status asthmaticus 5/5/2021     BPH (benign prostatic hyperplasia)      Cardiomyopathy (H)      COPD, group B, by GOLD 2017  classification (H)      Coronary artery disease due to calcified coronary lesion      Dyslipidemia, goal LDL below 70      Essential hypertension      History of transfusion      Persistent atrial fibrillation (H)      Pneumonia of left lower lobe due to infectious organism 10/4/2017     Skin cancer of trunk      Status post catheter ablation of atrial fibrillation 6/7/2017    PVI 4-2011 (Cryo/PVI + roof line + CTI line) Re-do PVI 7-2011 (RFA/PVI + CFE + VIDYA + confirmed CTI line)     Ventricular tachycardia (H)        Past Surgical History:   Past Surgical History:   Procedure Laterality Date     CARDIAC DEFIBRILLATOR PLACEMENT       CARDIOVERSION  07/11/2018    x20, last 2/12/15, 10/2015, 11/18/16, 6/16/17 by Lauren Foster CNP     CARDIOVERSION  07/11/2018     CARDIOVERSION  11/19/2021     COLONOSCOPY N/A 4/28/2017    Procedure: COLONOSCOPY with 2 ascending polyps and 1 transverse polyp;  Surgeon: Jose Whittington MD;  Location: [facility] GI;  Service:      CV CORONARY ANGIOGRAM N/A 9/20/2017    Procedure: Coronary Angiogram;  Surgeon: Sergio Cervantes MD;  Location: [facility] Cath Lab;  Service:      CV CORONARY ANGIOGRAM N/A 1/24/2022    Procedure: Coronary Angiogram;  Surgeon: Christi Saunders MD;  Location: [facility] CATH LAB CV     CV LEFT HEART CATH N/A 1/24/2022    Procedure: Left Heart Cath;  Surgeon: Christi Saunders MD;  Location: [facility] CATH LAB CV     CV RIGHT AND LEFT HEART CATH N/A 1/24/2022    Procedure: Right and Left Heart Catherization;  Surgeon: Christi Saunders MD;  Location: [facility] CATH LAB CV     EP ICD INSERT       FRACTURE SURGERY Left     wrist     INGUINAL HERNIA REPAIR Left 1967    while in the [employer] in [place] 13 month in [place]     INSERT / REPLACE / REMOVE PACEMAKER       IR MISCELLANEOUS PROCEDURE  4/30/2014     OTHER SURGICAL HISTORY      left hand surgery---tendon repair     IN ABLATE HEART DYSRHYTHM FOCUS  04/2011    Catheter Ablation Atrial Fibrillation PVI Apr 2011  (Cryo+RF-PVI + roof line + CTI line)     AL ABLATE HEART DYSRHYTHM FOCUS  2011    Re-do PVI 2011 (RFA-PVI + CFE + VIDYA + confirmation of CTI line)     TOTAL SHOULDER REPLACEMENT Right 2016    Dr. Abernathy of [facility]     WRIST SURGERY Left      ZZC MYERS W/O FACETEC FORAMOT/DSKC  VRT SEG, CERVICAL      Laminectomy Lumbar;  Recorded: 2012;       Family History:   Family History   Problem Relation Age of Onset     Cancer Mother         leukemia     Cancer Father         bladder     Cancer Sister         breast with lung met.     Aneurysm Sister      CABG Brother      CABG Brother      Valvular heart disease Brother         valve replacement       Social History:   Social History     Socioeconomic History     Marital status:      Spouse name: Not on file     Number of children: Not on file     Years of education: Not on file     Highest education level: Not on file   Occupational History     Not on file   Tobacco Use     Smoking status: Former Smoker     Packs/day: 1.00     Years: 4.00     Pack years: 4.00     Types: Cigarettes     Quit date: 1968     Years since quittin.4     Smokeless tobacco: Never Used   Substance and Sexual Activity     Alcohol use: Yes     Alcohol/week: 2.0 standard drinks     Comment: Alcoholic Drinks/day: 1 beer per week     Drug use: No     Sexual activity: Yes     Partners: Female     Birth control/protection: Post-menopausal   Other Topics Concern     Parent/sibling w/ CABG, MI or angioplasty before 65F 55M? Not Asked   Social History Narrative    Preloaded 2013     Social Determinants of Health     Financial Resource Strain: Not on file   Food Insecurity: Not on file   Transportation Needs: Not on file   Physical Activity: Not on file   Stress: Not on file   Social Connections: Not on file   Intimate Partner Violence: Not on file   Housing Stability: Not on file              Lab Results    Chemistry/lipid CBC Cardiac  Enzymes/BNP/TSH/INR   Lab Results   Component Value Date    CHOL 125 02/15/2022    HDL 59 02/15/2022    LDL 55 02/15/2022    TRIG 55 02/15/2022    CR 1.63 (H) 02/15/2022    BUN 41 (H) 02/15/2022    POTASSIUM 3.7 02/15/2022     02/15/2022    CO2 26 02/15/2022      Lab Results   Component Value Date    WBC 7.5 02/15/2022    HGB 11.0 (L) 02/15/2022    HCT 36.1 (L) 02/15/2022     (H) 02/15/2022     02/15/2022      Lab Results   Component Value Date     (H) 12/01/2020    NTBNP 2,870 (H) 02/15/2022    TSH 1.32 02/15/2022    INR 2.40 05/10/2022          Domo Garrett MD [facility]  Non-Invasive Cardiologist  [facility]  Pager [phone]

## 2022-05-17 NOTE — LETTER
5/17/2022    Vivek Guerrero MD  [facility] [address]  [facility] [address]    RE: Buck Sinclair       Dear Colleague,     I had the pleasure of seeing Buck Sinclair in the [facility].  HEART CARE ENCOUNTER NOTE        Assessment/Recommendations   Assessment:    1. Chronic congestive heart failure with reduced left ventricular ejection fraction with left ventricular ejection fraction improved to 50% due to nonischemic dilated cardiomyopathy (out of proportion to his degree of coronary artery disease). NYHA class III. Cardiac catheterization on 1/24/2022 showed normal left-sided filling pressures although he appears a bit fluid-overloaded today.  2. Coronary artery disease status post drug-eluting stenting x3 to the bafagefq-ufujcrs-iznwpt left anterior descending artery on 9/20/2017. Coronary angiography on 1/24/2022 showed patent stents and no other significant coronary artery disease. Denies angina.  3. Moderate-to-severe pulmonary hypertension. 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  4. Medtronic dual-chamber implantable cardiac defibrillator placement on 6/11/2014 for primary prevention of sudden cardiac death.  5. History of Medtronic dual-chamber permanent pacemaker placement in 1999 with generator replacement in 2005 and device removal in 2014.  6. Persistent atrial fibrillation status post multiple electrical cardioversions, most recently on 11/19/2021. He underwent complex catheter ablation x2 in 2011. Overall fairly low burden of atrial arrhythmia based on his device interrogations. [id number] score is at least 5.  7. Benign essential hypertension. Controlled.  8. Hyperlipidemia. Last LDL 55 mg/dL.  9. Chronic obstructive pulmonary disease on home oxygen.  10. 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    Plan:  1. We discussed possibly increasing his dose of furosemide, but he would like to keep the dose at 80 mg twice daily for  now.  2. Continue sotalol 160 mg twice daily.  3. Anticoagulation with warfarin goal INR 2-3.  4. Losartan 50 mg daily.  5. Atorvastatin 40 mg daily.  6. He follows in pulmonology clinic with Dr. Payton and pulmonary hypertension clinic with Dr. Morales.  7. Follows in device clinic.  8. Follow-up with me in 6 months.         History of Present Illness   Mr. Buck Sinclair is a 77 year old male with a significant past history of HFrEF due to nonischemic cardiomyopathy (out of proportion to CAD) with LVEF most recently noted to be 50%, persistent AF s/p multiple electrical cardioversions and extensive catheter ablations x2 in 2011, CAD s/p CANDELARIA x3 to the [id number]-mp-lsuxmx LAD, COPD on home O2, Medtronic dual-chamber permanent pacemaker placement in 1999 replaced with a Medtronic dual-chamber implantable cardiac defibrillator on 6/11/2014, PHTN, HTN, and HLD presenting for follow-up.    He is being followed in pulmonology by Dr. Payton and pulmonary hypertension clinic by Dr. Morales. His PHTN is felt to be primarily group III (due to lung disease) and he is current enrolled in a clinical trial investigating inhaled Tyvaso. He feels he is a bit better on the treatment.    He is currently using 6L O2 and gets out of breath with minimal exertion. He has gained a few pounds but has not been aware of any lower extremity swelling, chest pain/pressure/tightness, light headedness/dizziness, pre-syncope, syncope, lower extremity swelling, palpitations, paroxysmal nocturnal dyspnea (PND), or orthopnea.     Cardiac Problems and Cardiac Diagnostics     Most Recent Cardiac testing:  ECG dated 1/24/2022 (personaly reviewed and interpreted): atrial-paced, right bundle branch block QRS duration 126 ms, inferior infarct     Device check 2/18/2022 (report reviewed):  Type: In-clinic annual ICD check (done by [employer] Dimas) and follow-up with Dr. Renee  Presenting: APVS 66bpm, AP 92%,  1.8%  Lead/Battery Status: Stable lead  and battery measurements. Estimating 7 months remaining.  Atrial Arrhythmias: 108 mode switches, 8 EGM's suggesting an atrial tachy arrhythmia, longest episode lasting ~ 23 mins on 2/17/22, V-rates >/= 120 ~ 5 %, total burden 0.6%.  Vent Arrhythmias: None  Anticoagulant: Warfarin  Comments: Normal device function. Demonstrated alert tones to patient and wife who were able to hear tones. OptiVol at 0/baseline. Thoracic impedance and reference are trending together. RA Amplitude changed from 1.75V to 2.0V and RA Amplitude Minimum Adapted changed from 1.5V to 2.0V. AT/AF Daily [place] turned ON. Per Dr. Renee Atrial ATP programmed ON per [facility] Study protocol. Dr. Renee updated on device finding prior to visit.     ECHO 12/8/2021 (report reviewed):   1. Left ventricular systolic function is normal. The left ventricle is normal  in size. There is normal left ventricular wall thickness.Left ventricular  diastolic function is normal. No regional wall motion abnormalities noted.  2. The right ventricle is mildly dilated. Mildly decreased right ventricular  systolic functio.  3. There is mild to moderate (1-2+) tricuspid regurgitation.  4. The right ventricular systolic pressure is approximated at 45.7mmHg plus  the right atrial pressure.Right ventricular systolic pressure is elevated,  consistent with moderate pulmonary hypertension. Normal RA pressure of 3 mmHg.  6. The ascending aorta is mildly dilated at 42 mm.     Stress test 11/7/2019 (report reviewed):     The nuclear stress test is abnormal.     There is a small area of nontransmural infarction in the apical segment(s) of the left ventricle.     The left ventricular ejection fraction at stress is 63%.     A prior study was conducted on 7/19/2018.  This study has changes noted when compared with the prior study. The apical defect no longer demonstrates any ischemia.     Cardiac cath 1/24/2022 (report reviewed):     Mild coronary artery disease with patent LAD  "stents.    LVEDP and PCWP are normal. The PA pressure was elevated at 66/24 mmHg with a mean pressure of 37 mmHg. See numbers below.    Shikha and TD cardiac outputs were both 5.7 l/min.    Sats on 2L NC oxygen: Ao 93%, PA 60%, RA 59%     Medications  Allergies   Current Outpatient Medications   Medication Sig Dispense Refill     acetaminophen (TYLENOL) 325 MG tablet Take 650 mg by mouth 2 times daily       albuterol (PROAIR HFA/PROVENTIL HFA/VENTOLIN HFA) 108 (90 Base) MCG/ACT inhaler Inhale 2 puffs into the lungs every 4 hours as needed for shortness of breath / dyspnea or wheezing       Ascorbic Acid (VITAMIN C) 500 MG CAPS Take 1,000 mg by mouth daily       atorvastatin (LIPITOR) 40 MG tablet Take 40 mg by mouth daily       budesonide-formoterol (SYMBICORT) 160-4.5 MCG/ACT Inhaler Inhale 2 puffs into the lungs 2 times daily       co-enzyme Q-10 100 MG CAPS capsule Take 2 capsules (200 mg) by mouth daily 2 capsule      fish oil-omega-3 fatty acids 1000 MG capsule Take 1 g by mouth 2 times daily       FLUoxetine (PROZAC) 20 MG capsule TAKE 1 CAPSULE BY MOUTH EVERY DAY       furosemide (LASIX) 80 MG tablet Take 1 tablet (80 mg) by mouth 2 times daily 90 tablet 6     losartan (COZAAR) 50 MG tablet Take 1 tablet (50 mg) by mouth daily 90 tablet 3     magnesium chloride 535 (64 Mg) MG TBEC CR tablet Take 64 mg by mouth 2 times daily       multivitamin, therapeutic (THERA-VIT) TABS tablet Take 1 tablet by mouth daily       nitroGLYcerin (NITROSTAT) 0.4 MG sublingual tablet One tablet under the tongue every 5 minutes if needed for chest pain. May repeat every 5 minutes for a maximum of 3 doses in 15 minutes\" 25 tablet 3     OXYGEN-HELIUM IN 4-5 L        polyethylene glycol (MIRALAX) 17 GM/Dose powder Take 17 g by mouth daily        sotalol (BETAPACE) 160 MG tablet Take 1 tablet (160 mg) by mouth 2 times daily 180 tablet 3     study - treprostinil vs. placebo, IDS# [id number], 0.6 MG/ML neb solution Inhale 3 Breaths (18 mcg) " into the lungs 4 times daily       tiotropium (SPIRIVA) 18 MCG inhaled capsule Inhale 18 mcg into the lungs daily       vitamin D2 (ERGOCALCIFEROL) 36290 units (1250 mcg) capsule TAKE 1 CAPSULE BY MOUTH 2 TIMES A WEEK       warfarin ANTICOAGULANT (COUMADIN) 2.5 MG tablet Take 1 to 2 tablets (2.5 - 5 mg) daily by mouth. Adjust dose based on INR results as directed. 130 tablet 1      Allergies   Allergen Reactions     Adhesive Tape Other (See Comments)     ADHESIVE TAPE; SKIN IRRITATION; Skin pulled off with foam tape       Amiodarone      ADVERSE REACTION.  Sunlight sensitivity.     Lisinopril         Physical Examination Review of Systems   BP 92/64 (BP Location: Left arm, Patient Position: Sitting, Cuff Size: Adult Large)   Pulse 72   Resp 16   Wt 126.6 kg (279 lb)   BMI 34.87 kg/m    Body mass index is 34.87 kg/m .  Wt Readings from Last 3 Encounters:   05/17/22 126.6 kg (279 lb)   05/06/22 127.6 kg (281 lb 4.8 oz)   03/15/22 123.7 kg (272 lb 9.6 oz)       General Appearance:   Pleasant  male, appears  stated age. no acute distress, obese body habitus   ENT/Mouth: Facemask.     EYES:  no scleral icterus, normal conjunctivae   Neck: no carotid bruits. No anterior cervical lymphadenopaty   Respiratory:   lungs are clear to auscultation, no rales or wheezing, equal chest wall expansion    Cardiovascular:   Regular rhythm, normal rate. Normal first and second heart sounds with no murmurs, rubs, or gallops; the carotid, radial and posterior tibial pulses are intact, Jugular venous pressure not well seen, 1+ pitting lower extremity edema bilaterally    Abdomen/GI:  no organomegaly, masses, bruits, or tenderness; bowel sounds are present   Extremities: no cyanosis or clubbing   Skin: no xanthelasma, warm.    Heme/lymph/ Immunology No apparent bleeding noted.   Neurologic: Alert and oriented. normal gait, no tremors     Psychiatric: Pleasant, calm, appropriate affect.    A complete 10 system review of systems was  performed and is negative except as mentioned in the HPI/subjective.         Past History   Past Medical History:   Past Medical History:   Diagnosis Date     Anemia      Asthma without status asthmaticus 5/5/2021     BPH (benign prostatic hyperplasia)      Cardiomyopathy (H)      COPD, group B, by GOLD 2017 classification (H)      Coronary artery disease due to calcified coronary lesion      Dyslipidemia, goal LDL below 70      Essential hypertension      History of transfusion      Persistent atrial fibrillation (H)      Pneumonia of left lower lobe due to infectious organism 10/4/2017     Skin cancer of trunk      Status post catheter ablation of atrial fibrillation 6/7/2017    PVI 4-2011 (Cryo/PVI + roof line + CTI line) Re-do PVI 7-2011 (RFA/PVI + CFE + VIDYA + confirmed CTI line)     Ventricular tachycardia (H)        Past Surgical History:   Past Surgical History:   Procedure Laterality Date     CARDIAC DEFIBRILLATOR PLACEMENT       CARDIOVERSION  07/11/2018    x20, last 2/12/15, 10/2015, 11/18/16, 6/16/17 by Lauren Foster, RAJESH     CARDIOVERSION  07/11/2018     CARDIOVERSION  11/19/2021     COLONOSCOPY N/A 4/28/2017    Procedure: COLONOSCOPY with 2 ascending polyps and 1 transverse polyp;  Surgeon: Jose Whittington MD;  Location[facility] GI;  Service:      CV CORONARY ANGIOGRAM N/A 9/20/2017    Procedure: Coronary Angiogram;  Surgeon: Sergio Cervantes MD;  Location: [facility] Cath Lab;  Service:      CV CORONARY ANGIOGRAM N/A 1/24/2022    Procedure: Coronary Angiogram;  Surgeon: Christi Saunders MD;  Location: [facility] CATH LAB CV     CV LEFT HEART CATH N/A 1/24/2022    Procedure: Left Heart Cath;  Surgeon: Christi Saunders MD;  Location: [facility] CATH LAB CV     CV RIGHT AND LEFT HEART CATH N/A 1/24/2022    Procedure: Right and Left Heart Catherization;  Surgeon: Christi Saunders MD;  Location: [facility] CATH LAB CV     EP ICD INSERT       FRACTURE SURGERY Left     wrist     INGUINAL HERNIA REPAIR Left  1967    while in the [employer] in [place] after 13 month in [place]     INSERT / REPLACE / REMOVE PACEMAKER       IR MISCELLANEOUS PROCEDURE  2014     OTHER SURGICAL HISTORY      left hand surgery---tendon repair     [place] ABLATE HEART DYSRHYTHM FOCUS  2011    Catheter Ablation Atrial Fibrillation PVI 2011 (Cryo+RF-PVI + roof line + CTI line)     [place] ABLATE HEART DYSRHYTHM FOCUS  2011    Re-do PVI 2011 (RFA-PVI + CFE + VIDYA + confirmation of CTI line)     TOTAL SHOULDER REPLACEMENT Right 2016    Dr. Abernathy of [facility]     WRIST SURGERY Left      ZZC MYERS W/O FACETEC FORAMOT/DSKC  VRT SEG, CERVICAL      Laminectomy Lumbar;  Recorded: 2012;       Family History:   Family History   Problem Relation Age of Onset     Cancer Mother         leukemia     Cancer Father         bladder     Cancer Sister         breast with lung met.     Aneurysm Sister      CABG Brother      CABG Brother      Valvular heart disease Brother         valve replacement       Social History:   Social History     Socioeconomic History     Marital status:      Spouse name: Not on file     Number of children: Not on file     Years of education: Not on file     Highest education level: Not on file   Occupational History     Not on file   Tobacco Use     Smoking status: Former Smoker     Packs/day: 1.00     Years: 4.00     Pack years: 4.00     Types: Cigarettes     Quit date: 1968     Years since quittin.4     Smokeless tobacco: Never Used   Substance and Sexual Activity     Alcohol use: Yes     Alcohol/week: 2.0 standard drinks     Comment: Alcoholic Drinks/day: 1 beer per week     Drug use: No     Sexual activity: Yes     Partners: Female     Birth control/protection: Post-menopausal   Other Topics Concern     Parent/sibling w/ CABG, MI or angioplasty before 65F 55M? Not Asked   Social History Narrative    Preloaded 2013     Social Determinants of Health     Financial Resource Strain: Not  on file   Food Insecurity: Not on file   Transportation Needs: Not on file   Physical Activity: Not on file   Stress: Not on file   Social Connections: Not on file   Intimate Partner Violence: Not on file   Housing Stability: Not on file              Lab Results    Chemistry/lipid CBC Cardiac Enzymes/BNP/TSH/INR   Lab Results   Component Value Date    CHOL 125 02/15/2022    HDL 59 02/15/2022    LDL 55 02/15/2022    TRIG 55 02/15/2022    CR 1.63 (H) 02/15/2022    BUN 41 (H) 02/15/2022    POTASSIUM 3.7 02/15/2022     02/15/2022    CO2 26 02/15/2022      Lab Results   Component Value Date    WBC 7.5 02/15/2022    HGB 11.0 (L) 02/15/2022    HCT 36.1 (L) 02/15/2022     (H) 02/15/2022     02/15/2022      Lab Results   Component Value Date     (H) 12/01/2020    NTBNP 2,870 (H) 02/15/2022    TSH 1.32 02/15/2022    INR 2.40 05/10/2022          Domo Garrett MD [facility]  Non-Invasive Cardiologist  [facility]  Pager [phone]    Thank you for allowing me to participate in the care of your patient.      Sincerely,     Domo Garrett MD     [facility]  cc:   No referring provider defined for this encounter.

## 2022-05-17 NOTE — PROGRESS NOTES
ANTICOAGULATION  MANAGEMENT-Home Monitor Managed by Exception    Buck JONES Sinclair 77 year old male is on warfarin with therapeutic INR result. (Goal INR 2.0-3.0)    Recent labs: (last 7 days)     05/17/22  0000   INR 2.50       Previous INR was Therapeutic  Medication, diet, health changes since last INR:chart reviewed; none identified  Contacted within the last 12 weeks by phone on 4/19/22      LUCAS     Buck was NOT contacted regarding therapeutic result today per home monitoring policy manage by exception agreement.   Current warfarin dose is to be continued:     Summary  As of 5/17/2022    Full warfarin instructions:  5 mg every Mon, Thu; 2.5 mg all other days   Next INR check:  5/24/2022               Gisele Saul RN  Anticoagulation Clinic  5/17/2022    _______________________________________________________________________     Anticoagulation Episode Summary     Current INR goal:  2.0-3.0   TTR:  86.6 % (1 y)   Target end date:  Indefinite   Send INR reminders to:  LARISA RICHARDS    Indications    Persistent Atrial Fibrillation [I48.91]  Persistent atrial fibrillation (H) [I48.19]           Comments:  Home monitor ( [employer] )managed by exception         Anticoagulation Care Providers     Provider Role Specialty Phone number    Erica Hansen APRN CNP Referring Cardiovascular Disease [phone]    Everett Renee MD  Cardiovascular Disease [phone]

## 2022-05-17 NOTE — PATIENT INSTRUCTIONS
We will continue your current medications.  If you continue to gain weight, have more swelling in your legs, or feel more short of breath, let me know and we can increase your water pill dose.  See me back in 6 months.

## 2022-05-18 LAB
DLCOCOR-%PRED-PRE: 36 %
DLCOCOR-PRE: 10.38 ML/MIN/MMHG
DLCOUNC-%PRED-PRE: 30 %
DLCOUNC-PRE: 8.81 ML/MIN/MMHG
DLCOUNC-PRED: 28.8 ML/MIN/MMHG
ERV-%PRED-PRE: 74 %
ERV-PRE: 0.7 L
ERV-PRED: 0.94 L
EXPTIME-PRE: 10.13 SEC
FEF2575-%PRED-PRE: 32 %
FEF2575-PRE: 0.78 L/SEC
FEF2575-PRED: 2.41 L/SEC
FEFMAX-%PRED-PRE: 77 %
FEFMAX-PRE: 6.83 L/SEC
FEFMAX-PRED: 8.77 L/SEC
FEV1-%PRED-PRE: 58 %
FEV1-PRE: 2.03 L
FEV1FEV6-PRE: 61 %
FEV1FEV6-PRED: 77 %
FEV1FVC-PRE: 56 %
FEV1FVC-PRED: 74 %
FEV1SVC-PRE: 55 %
FEV1SVC-PRED: 63 %
FIFMAX-PRE: 6.16 L/SEC
FVC-%PRED-PRE: 77 %
FVC-PRE: 3.65 L
FVC-PRED: 4.72 L
IC-%PRED-PRE: 66 %
IC-PRE: 3.01 L
IC-PRED: 4.54 L
VA-%PRED-PRE: 73 %
VA-PRE: 5.49 L
VC-%PRED-PRE: 67 %
VC-PRE: 3.71 L
VC-PRED: 5.48 L

## 2022-05-22 ENCOUNTER — ANCILLARY PROCEDURE (OUTPATIENT)
Dept: CARDIOLOGY | Facility: CLINIC | Age: 77
End: 2022-05-22
Attending: INTERNAL MEDICINE
Payer: COMMERCIAL

## 2022-05-22 DIAGNOSIS — Z95.810 ICD (IMPLANTABLE CARDIOVERTER-DEFIBRILLATOR) IN PLACE: ICD-10-CM

## 2022-05-22 DIAGNOSIS — I42.8 NON-ISCHEMIC CARDIOMYOPATHY (H): ICD-10-CM

## 2022-05-22 PROCEDURE — 93295 DEV INTERROG REMOTE 1/2/MLT: CPT | Performed by: INTERNAL MEDICINE

## 2022-05-22 PROCEDURE — 93296 REM INTERROG EVL PM/IDS: CPT | Performed by: INTERNAL MEDICINE

## 2022-05-23 LAB
MDC_IDC_EPISODE_DTM: NORMAL
MDC_IDC_EPISODE_DURATION: 100 S
MDC_IDC_EPISODE_DURATION: 100 S
MDC_IDC_EPISODE_DURATION: 101 S
MDC_IDC_EPISODE_DURATION: 101 S
MDC_IDC_EPISODE_DURATION: 102 S
MDC_IDC_EPISODE_DURATION: 102 S
MDC_IDC_EPISODE_DURATION: 103 S
MDC_IDC_EPISODE_DURATION: 104 S
MDC_IDC_EPISODE_DURATION: 106 S
MDC_IDC_EPISODE_DURATION: 107 S
MDC_IDC_EPISODE_DURATION: 1080 S
MDC_IDC_EPISODE_DURATION: 109 S
MDC_IDC_EPISODE_DURATION: 11 S
MDC_IDC_EPISODE_DURATION: 111 S
MDC_IDC_EPISODE_DURATION: 1169 S
MDC_IDC_EPISODE_DURATION: 118 S
MDC_IDC_EPISODE_DURATION: 120 S
MDC_IDC_EPISODE_DURATION: 122 S
MDC_IDC_EPISODE_DURATION: 122 S
MDC_IDC_EPISODE_DURATION: 126 S
MDC_IDC_EPISODE_DURATION: 127 S
MDC_IDC_EPISODE_DURATION: 128 S
MDC_IDC_EPISODE_DURATION: 129 S
MDC_IDC_EPISODE_DURATION: 132 S
MDC_IDC_EPISODE_DURATION: 133 S
MDC_IDC_EPISODE_DURATION: 134 S
MDC_IDC_EPISODE_DURATION: 135 S
MDC_IDC_EPISODE_DURATION: 139 S
MDC_IDC_EPISODE_DURATION: 143 S
MDC_IDC_EPISODE_DURATION: 146 S
MDC_IDC_EPISODE_DURATION: 146 S
MDC_IDC_EPISODE_DURATION: 155 S
MDC_IDC_EPISODE_DURATION: 155 S
MDC_IDC_EPISODE_DURATION: 157 S
MDC_IDC_EPISODE_DURATION: 157 S
MDC_IDC_EPISODE_DURATION: 158 S
MDC_IDC_EPISODE_DURATION: 158 S
MDC_IDC_EPISODE_DURATION: 159 S
MDC_IDC_EPISODE_DURATION: 1629 S
MDC_IDC_EPISODE_DURATION: 166 S
MDC_IDC_EPISODE_DURATION: 173 S
MDC_IDC_EPISODE_DURATION: 173 S
MDC_IDC_EPISODE_DURATION: 175 S
MDC_IDC_EPISODE_DURATION: 1851 S
MDC_IDC_EPISODE_DURATION: 1886 S
MDC_IDC_EPISODE_DURATION: 19 S
MDC_IDC_EPISODE_DURATION: 196 S
MDC_IDC_EPISODE_DURATION: 197 S
MDC_IDC_EPISODE_DURATION: 197 S
MDC_IDC_EPISODE_DURATION: 209 S
MDC_IDC_EPISODE_DURATION: 212 S
MDC_IDC_EPISODE_DURATION: 2180 S
MDC_IDC_EPISODE_DURATION: 220 S
MDC_IDC_EPISODE_DURATION: 222 S
MDC_IDC_EPISODE_DURATION: 223 S
MDC_IDC_EPISODE_DURATION: 241 S
MDC_IDC_EPISODE_DURATION: 242 S
MDC_IDC_EPISODE_DURATION: 243 S
MDC_IDC_EPISODE_DURATION: 246 S
MDC_IDC_EPISODE_DURATION: 2523 S
MDC_IDC_EPISODE_DURATION: 255 S
MDC_IDC_EPISODE_DURATION: 285 S
MDC_IDC_EPISODE_DURATION: 287 S
MDC_IDC_EPISODE_DURATION: 29 S
MDC_IDC_EPISODE_DURATION: 30 S
MDC_IDC_EPISODE_DURATION: 316 S
MDC_IDC_EPISODE_DURATION: 351 S
MDC_IDC_EPISODE_DURATION: 359 S
MDC_IDC_EPISODE_DURATION: 36 S
MDC_IDC_EPISODE_DURATION: 363 S
MDC_IDC_EPISODE_DURATION: 369 S
MDC_IDC_EPISODE_DURATION: 37 S
MDC_IDC_EPISODE_DURATION: 38 S
MDC_IDC_EPISODE_DURATION: 39 S
MDC_IDC_EPISODE_DURATION: 40 S
MDC_IDC_EPISODE_DURATION: 40 S
MDC_IDC_EPISODE_DURATION: 438 S
MDC_IDC_EPISODE_DURATION: 44 S
MDC_IDC_EPISODE_DURATION: 45 S
MDC_IDC_EPISODE_DURATION: 471 S
MDC_IDC_EPISODE_DURATION: 479 S
MDC_IDC_EPISODE_DURATION: 485 S
MDC_IDC_EPISODE_DURATION: 495 S
MDC_IDC_EPISODE_DURATION: 50 S
MDC_IDC_EPISODE_DURATION: 502 S
MDC_IDC_EPISODE_DURATION: 51 S
MDC_IDC_EPISODE_DURATION: 532 S
MDC_IDC_EPISODE_DURATION: 532 S
MDC_IDC_EPISODE_DURATION: 54 S
MDC_IDC_EPISODE_DURATION: 55 S
MDC_IDC_EPISODE_DURATION: 55 S
MDC_IDC_EPISODE_DURATION: 565 S
MDC_IDC_EPISODE_DURATION: 58 S
MDC_IDC_EPISODE_DURATION: 59 S
MDC_IDC_EPISODE_DURATION: 614 S
MDC_IDC_EPISODE_DURATION: 618 S
MDC_IDC_EPISODE_DURATION: 62 S
MDC_IDC_EPISODE_DURATION: 65 S
MDC_IDC_EPISODE_DURATION: 65 S
MDC_IDC_EPISODE_DURATION: 652 S
MDC_IDC_EPISODE_DURATION: 68 S
MDC_IDC_EPISODE_DURATION: 694 S
MDC_IDC_EPISODE_DURATION: 70 S
MDC_IDC_EPISODE_DURATION: 70 S
MDC_IDC_EPISODE_DURATION: 737 S
MDC_IDC_EPISODE_DURATION: 747 S
MDC_IDC_EPISODE_DURATION: 75 S
MDC_IDC_EPISODE_DURATION: 77 S
MDC_IDC_EPISODE_DURATION: 7896 S
MDC_IDC_EPISODE_DURATION: 8 S
MDC_IDC_EPISODE_DURATION: 82 S
MDC_IDC_EPISODE_DURATION: 825 S
MDC_IDC_EPISODE_DURATION: 85 S
MDC_IDC_EPISODE_DURATION: 856 S
MDC_IDC_EPISODE_DURATION: 87 S
MDC_IDC_EPISODE_DURATION: 87 S
MDC_IDC_EPISODE_DURATION: 897 S
MDC_IDC_EPISODE_DURATION: 9 S
MDC_IDC_EPISODE_DURATION: 90 S
MDC_IDC_EPISODE_DURATION: 95 S
MDC_IDC_EPISODE_DURATION: 96 S
MDC_IDC_EPISODE_DURATION: 97 S
MDC_IDC_EPISODE_DURATION: 99 S
MDC_IDC_EPISODE_DURATION: NORMAL S
MDC_IDC_EPISODE_DURATION: NORMAL S
MDC_IDC_EPISODE_ID: 1504
MDC_IDC_EPISODE_ID: 1506
MDC_IDC_EPISODE_ID: 1507
MDC_IDC_EPISODE_ID: 1508
MDC_IDC_EPISODE_ID: 1509
MDC_IDC_EPISODE_ID: 1510
MDC_IDC_EPISODE_ID: 1511
MDC_IDC_EPISODE_ID: 1512
MDC_IDC_EPISODE_ID: 1513
MDC_IDC_EPISODE_ID: 1514
MDC_IDC_EPISODE_ID: 1515
MDC_IDC_EPISODE_ID: 1516
MDC_IDC_EPISODE_ID: 1517
MDC_IDC_EPISODE_ID: 1518
MDC_IDC_EPISODE_ID: 1519
MDC_IDC_EPISODE_ID: 1520
MDC_IDC_EPISODE_ID: 1521
MDC_IDC_EPISODE_ID: 1522
MDC_IDC_EPISODE_ID: 1523
MDC_IDC_EPISODE_ID: 1524
MDC_IDC_EPISODE_ID: 1525
MDC_IDC_EPISODE_ID: 1526
MDC_IDC_EPISODE_ID: 1527
MDC_IDC_EPISODE_ID: 1528
MDC_IDC_EPISODE_ID: 1529
MDC_IDC_EPISODE_ID: 1530
MDC_IDC_EPISODE_ID: 1531
MDC_IDC_EPISODE_ID: 1532
MDC_IDC_EPISODE_ID: 1533
MDC_IDC_EPISODE_ID: 1534
MDC_IDC_EPISODE_ID: 1535
MDC_IDC_EPISODE_ID: 1536
MDC_IDC_EPISODE_ID: 1537
MDC_IDC_EPISODE_ID: 1538
MDC_IDC_EPISODE_ID: 1539
MDC_IDC_EPISODE_ID: 1540
MDC_IDC_EPISODE_ID: 1541
MDC_IDC_EPISODE_ID: 1542
MDC_IDC_EPISODE_ID: 1543
MDC_IDC_EPISODE_ID: 1544
MDC_IDC_EPISODE_ID: 1545
MDC_IDC_EPISODE_ID: 1546
MDC_IDC_EPISODE_ID: 1547
MDC_IDC_EPISODE_ID: 1548
MDC_IDC_EPISODE_ID: 1549
MDC_IDC_EPISODE_ID: 1550
MDC_IDC_EPISODE_ID: 1551
MDC_IDC_EPISODE_ID: 1552
MDC_IDC_EPISODE_ID: 1553
MDC_IDC_EPISODE_ID: 1554
MDC_IDC_EPISODE_ID: 1555
MDC_IDC_EPISODE_ID: 1556
MDC_IDC_EPISODE_ID: 1557
MDC_IDC_EPISODE_ID: 1558
MDC_IDC_EPISODE_ID: 1559
MDC_IDC_EPISODE_ID: 1560
MDC_IDC_EPISODE_ID: 1561
MDC_IDC_EPISODE_ID: 1562
MDC_IDC_EPISODE_ID: 1563
MDC_IDC_EPISODE_ID: 1564
MDC_IDC_EPISODE_ID: 1565
MDC_IDC_EPISODE_ID: 1566
MDC_IDC_EPISODE_ID: 1567
MDC_IDC_EPISODE_ID: 1568
MDC_IDC_EPISODE_ID: 1569
MDC_IDC_EPISODE_ID: 1570
MDC_IDC_EPISODE_ID: 1571
MDC_IDC_EPISODE_ID: 1572
MDC_IDC_EPISODE_ID: 1573
MDC_IDC_EPISODE_ID: 1574
MDC_IDC_EPISODE_ID: 1575
MDC_IDC_EPISODE_ID: 1576
MDC_IDC_EPISODE_ID: 1577
MDC_IDC_EPISODE_ID: 1578
MDC_IDC_EPISODE_ID: 1579
MDC_IDC_EPISODE_ID: 1580
MDC_IDC_EPISODE_ID: 1581
MDC_IDC_EPISODE_ID: 1582
MDC_IDC_EPISODE_ID: 1583
MDC_IDC_EPISODE_ID: 1584
MDC_IDC_EPISODE_ID: 1585
MDC_IDC_EPISODE_ID: 1586
MDC_IDC_EPISODE_ID: 1587
MDC_IDC_EPISODE_ID: 1588
MDC_IDC_EPISODE_ID: 1589
MDC_IDC_EPISODE_ID: 1590
MDC_IDC_EPISODE_ID: 1591
MDC_IDC_EPISODE_ID: 1592
MDC_IDC_EPISODE_ID: 1593
MDC_IDC_EPISODE_ID: 1594
MDC_IDC_EPISODE_ID: 1595
MDC_IDC_EPISODE_ID: 1596
MDC_IDC_EPISODE_ID: 1597
MDC_IDC_EPISODE_ID: 1598
MDC_IDC_EPISODE_ID: 1599
MDC_IDC_EPISODE_ID: 1600
MDC_IDC_EPISODE_ID: 1601
MDC_IDC_EPISODE_ID: 1602
MDC_IDC_EPISODE_ID: 1603
MDC_IDC_EPISODE_ID: 1604
MDC_IDC_EPISODE_ID: 1605
MDC_IDC_EPISODE_ID: 1606
MDC_IDC_EPISODE_ID: 1607
MDC_IDC_EPISODE_ID: 1608
MDC_IDC_EPISODE_ID: 1609
MDC_IDC_EPISODE_ID: 1610
MDC_IDC_EPISODE_ID: 1611
MDC_IDC_EPISODE_ID: 1612
MDC_IDC_EPISODE_ID: 1613
MDC_IDC_EPISODE_ID: 1614
MDC_IDC_EPISODE_ID: 1615
MDC_IDC_EPISODE_ID: 1616
MDC_IDC_EPISODE_ID: 1617
MDC_IDC_EPISODE_ID: 1618
MDC_IDC_EPISODE_ID: 1619
MDC_IDC_EPISODE_ID: 1620
MDC_IDC_EPISODE_ID: 1621
MDC_IDC_EPISODE_ID: 1622
MDC_IDC_EPISODE_ID: 1623
MDC_IDC_EPISODE_ID: 1624
MDC_IDC_EPISODE_ID: 1625
MDC_IDC_EPISODE_ID: 1626
MDC_IDC_EPISODE_ID: 1627
MDC_IDC_EPISODE_ID: 1628
MDC_IDC_EPISODE_ID: 1629
MDC_IDC_EPISODE_ID: 1630
MDC_IDC_EPISODE_ID: 1631
MDC_IDC_EPISODE_ID: 1632
MDC_IDC_EPISODE_ID: 1633
MDC_IDC_EPISODE_ID: 1634
MDC_IDC_EPISODE_ID: 1635
MDC_IDC_EPISODE_ID: 1636
MDC_IDC_EPISODE_ID: 1637
MDC_IDC_EPISODE_ID: 1638
MDC_IDC_EPISODE_ID: 1639
MDC_IDC_EPISODE_ID: 1640
MDC_IDC_EPISODE_ID: 1641
MDC_IDC_EPISODE_ID: 1642
MDC_IDC_EPISODE_ID: 1643
MDC_IDC_EPISODE_TYPE: NORMAL
MDC_IDC_LEAD_IMPLANT_DT: NORMAL
MDC_IDC_LEAD_IMPLANT_DT: NORMAL
MDC_IDC_LEAD_LOCATION: NORMAL
MDC_IDC_LEAD_LOCATION: NORMAL
MDC_IDC_LEAD_LOCATION_DETAIL_1: NORMAL
MDC_IDC_LEAD_LOCATION_DETAIL_1: NORMAL
MDC_IDC_LEAD_MFG: NORMAL
MDC_IDC_LEAD_MFG: NORMAL
MDC_IDC_LEAD_MODEL: NORMAL
MDC_IDC_LEAD_MODEL: NORMAL
MDC_IDC_LEAD_POLARITY_TYPE: NORMAL
MDC_IDC_LEAD_POLARITY_TYPE: NORMAL
MDC_IDC_LEAD_SERIAL: NORMAL
MDC_IDC_LEAD_SERIAL: NORMAL
MDC_IDC_MSMT_BATTERY_DTM: NORMAL
MDC_IDC_MSMT_BATTERY_REMAINING_LONGEVITY: 5 MO
MDC_IDC_MSMT_BATTERY_RRT_TRIGGER: 2.73
MDC_IDC_MSMT_BATTERY_STATUS: NORMAL
MDC_IDC_MSMT_BATTERY_VOLTAGE: 2.81 V
MDC_IDC_MSMT_CAP_CHARGE_DTM: NORMAL
MDC_IDC_MSMT_CAP_CHARGE_ENERGY: 18 J
MDC_IDC_MSMT_CAP_CHARGE_TIME: 4.95
MDC_IDC_MSMT_CAP_CHARGE_TYPE: NORMAL
MDC_IDC_MSMT_LEADCHNL_RA_IMPEDANCE_VALUE: 361 OHM
MDC_IDC_MSMT_LEADCHNL_RA_PACING_THRESHOLD_AMPLITUDE: 1.12 V
MDC_IDC_MSMT_LEADCHNL_RA_PACING_THRESHOLD_PULSEWIDTH: 0.4 MS
MDC_IDC_MSMT_LEADCHNL_RA_SENSING_INTR_AMPL: 0.62 MV
MDC_IDC_MSMT_LEADCHNL_RA_SENSING_INTR_AMPL: 0.62 MV
MDC_IDC_MSMT_LEADCHNL_RV_IMPEDANCE_VALUE: 342 OHM
MDC_IDC_MSMT_LEADCHNL_RV_IMPEDANCE_VALUE: 418 OHM
MDC_IDC_MSMT_LEADCHNL_RV_PACING_THRESHOLD_AMPLITUDE: 0.5 V
MDC_IDC_MSMT_LEADCHNL_RV_PACING_THRESHOLD_PULSEWIDTH: 0.4 MS
MDC_IDC_MSMT_LEADCHNL_RV_SENSING_INTR_AMPL: 8.25 MV
MDC_IDC_MSMT_LEADCHNL_RV_SENSING_INTR_AMPL: 8.25 MV
MDC_IDC_PG_IMPLANT_DTM: NORMAL
MDC_IDC_PG_MFG: NORMAL
MDC_IDC_PG_MODEL: NORMAL
MDC_IDC_PG_SERIAL: NORMAL
MDC_IDC_PG_TYPE: NORMAL
MDC_IDC_SESS_CLINIC_NAME: NORMAL
MDC_IDC_SESS_DTM: NORMAL
MDC_IDC_SESS_TYPE: NORMAL
MDC_IDC_SET_BRADY_AT_MODE_SWITCH_RATE: 171 {BEATS}/MIN
MDC_IDC_SET_BRADY_HYSTRATE: NORMAL
MDC_IDC_SET_BRADY_LOWRATE: 60 {BEATS}/MIN
MDC_IDC_SET_BRADY_MAX_SENSOR_RATE: 120 {BEATS}/MIN
MDC_IDC_SET_BRADY_MAX_TRACKING_RATE: 130 {BEATS}/MIN
MDC_IDC_SET_BRADY_MODE: NORMAL
MDC_IDC_SET_BRADY_PAV_DELAY_LOW: 180 MS
MDC_IDC_SET_BRADY_SAV_DELAY_LOW: 150 MS
MDC_IDC_SET_LEADCHNL_RA_PACING_AMPLITUDE: 2 V
MDC_IDC_SET_LEADCHNL_RA_PACING_ANODE_ELECTRODE_1: NORMAL
MDC_IDC_SET_LEADCHNL_RA_PACING_ANODE_LOCATION_1: NORMAL
MDC_IDC_SET_LEADCHNL_RA_PACING_CAPTURE_MODE: NORMAL
MDC_IDC_SET_LEADCHNL_RA_PACING_CATHODE_ELECTRODE_1: NORMAL
MDC_IDC_SET_LEADCHNL_RA_PACING_CATHODE_LOCATION_1: NORMAL
MDC_IDC_SET_LEADCHNL_RA_PACING_POLARITY: NORMAL
MDC_IDC_SET_LEADCHNL_RA_PACING_PULSEWIDTH: 0.4 MS
MDC_IDC_SET_LEADCHNL_RA_SENSING_ANODE_ELECTRODE_1: NORMAL
MDC_IDC_SET_LEADCHNL_RA_SENSING_ANODE_LOCATION_1: NORMAL
MDC_IDC_SET_LEADCHNL_RA_SENSING_CATHODE_ELECTRODE_1: NORMAL
MDC_IDC_SET_LEADCHNL_RA_SENSING_CATHODE_LOCATION_1: NORMAL
MDC_IDC_SET_LEADCHNL_RA_SENSING_POLARITY: NORMAL
MDC_IDC_SET_LEADCHNL_RA_SENSING_SENSITIVITY: 0.15 MV
MDC_IDC_SET_LEADCHNL_RV_PACING_AMPLITUDE: 1.5 V
MDC_IDC_SET_LEADCHNL_RV_PACING_ANODE_ELECTRODE_1: NORMAL
MDC_IDC_SET_LEADCHNL_RV_PACING_ANODE_LOCATION_1: NORMAL
MDC_IDC_SET_LEADCHNL_RV_PACING_CAPTURE_MODE: NORMAL
MDC_IDC_SET_LEADCHNL_RV_PACING_CATHODE_ELECTRODE_1: NORMAL
MDC_IDC_SET_LEADCHNL_RV_PACING_CATHODE_LOCATION_1: NORMAL
MDC_IDC_SET_LEADCHNL_RV_PACING_POLARITY: NORMAL
MDC_IDC_SET_LEADCHNL_RV_PACING_PULSEWIDTH: 0.4 MS
MDC_IDC_SET_LEADCHNL_RV_SENSING_ANODE_ELECTRODE_1: NORMAL
MDC_IDC_SET_LEADCHNL_RV_SENSING_ANODE_LOCATION_1: NORMAL
MDC_IDC_SET_LEADCHNL_RV_SENSING_CATHODE_ELECTRODE_1: NORMAL
MDC_IDC_SET_LEADCHNL_RV_SENSING_CATHODE_LOCATION_1: NORMAL
MDC_IDC_SET_LEADCHNL_RV_SENSING_POLARITY: NORMAL
MDC_IDC_SET_LEADCHNL_RV_SENSING_SENSITIVITY: 0.45 MV
MDC_IDC_SET_ZONE_DETECTION_BEATS_DENOMINATOR: 32 {BEATS}
MDC_IDC_SET_ZONE_DETECTION_BEATS_NUMERATOR: 24 {BEATS}
MDC_IDC_SET_ZONE_DETECTION_INTERVAL: 200 MS
MDC_IDC_SET_ZONE_DETECTION_INTERVAL: 240 MS
MDC_IDC_SET_ZONE_DETECTION_INTERVAL: 320 MS
MDC_IDC_SET_ZONE_DETECTION_INTERVAL: 350 MS
MDC_IDC_SET_ZONE_DETECTION_INTERVAL: 360 MS
MDC_IDC_SET_ZONE_DETECTION_INTERVAL: 360 MS
MDC_IDC_SET_ZONE_TYPE: NORMAL
MDC_IDC_STAT_AT_BURDEN_PERCENT: 4.7 %
MDC_IDC_STAT_AT_DTM_END: NORMAL
MDC_IDC_STAT_AT_DTM_START: NORMAL
MDC_IDC_STAT_BRADY_AP_VP_PERCENT: 1.56 %
MDC_IDC_STAT_BRADY_AP_VS_PERCENT: 86.35 %
MDC_IDC_STAT_BRADY_AS_VP_PERCENT: 2.89 %
MDC_IDC_STAT_BRADY_AS_VS_PERCENT: 9.21 %
MDC_IDC_STAT_BRADY_DTM_END: NORMAL
MDC_IDC_STAT_BRADY_DTM_START: NORMAL
MDC_IDC_STAT_BRADY_RA_PERCENT_PACED: 86.49 %
MDC_IDC_STAT_BRADY_RV_PERCENT_PACED: 4.41 %
MDC_IDC_STAT_EPISODE_RECENT_COUNT: 0
MDC_IDC_STAT_EPISODE_RECENT_COUNT: 143
MDC_IDC_STAT_EPISODE_RECENT_COUNT_DTM_END: NORMAL
MDC_IDC_STAT_EPISODE_RECENT_COUNT_DTM_START: NORMAL
MDC_IDC_STAT_EPISODE_TOTAL_COUNT: 0
MDC_IDC_STAT_EPISODE_TOTAL_COUNT: 1
MDC_IDC_STAT_EPISODE_TOTAL_COUNT: 2960
MDC_IDC_STAT_EPISODE_TOTAL_COUNT: 6
MDC_IDC_STAT_EPISODE_TOTAL_COUNT_DTM_END: NORMAL
MDC_IDC_STAT_EPISODE_TOTAL_COUNT_DTM_START: NORMAL
MDC_IDC_STAT_EPISODE_TYPE: NORMAL
MDC_IDC_STAT_TACHYTHERAPY_ATP_DELIVERED_RECENT: 0
MDC_IDC_STAT_TACHYTHERAPY_ATP_DELIVERED_TOTAL: 0
MDC_IDC_STAT_TACHYTHERAPY_RECENT_DTM_END: NORMAL
MDC_IDC_STAT_TACHYTHERAPY_RECENT_DTM_START: NORMAL
MDC_IDC_STAT_TACHYTHERAPY_SHOCKS_ABORTED_RECENT: 0
MDC_IDC_STAT_TACHYTHERAPY_SHOCKS_ABORTED_TOTAL: 0
MDC_IDC_STAT_TACHYTHERAPY_SHOCKS_DELIVERED_RECENT: 0
MDC_IDC_STAT_TACHYTHERAPY_SHOCKS_DELIVERED_TOTAL: 2
MDC_IDC_STAT_TACHYTHERAPY_TOTAL_DTM_END: NORMAL
MDC_IDC_STAT_TACHYTHERAPY_TOTAL_DTM_START: NORMAL

## 2022-05-24 ENCOUNTER — DOCUMENTATION ONLY (OUTPATIENT)
Dept: ANTICOAGULATION | Facility: CLINIC | Age: 77
End: 2022-05-24
Payer: COMMERCIAL

## 2022-05-24 DIAGNOSIS — I48.19 PERSISTENT ATRIAL FIBRILLATION (H): ICD-10-CM

## 2022-05-24 DIAGNOSIS — I48.91 ATRIAL FIBRILLATION (H): Primary | ICD-10-CM

## 2022-05-24 LAB — INR HOME MONITORING: 2.7 (ref 2–3)

## 2022-05-24 NOTE — PROGRESS NOTES
ANTICOAGULATION  MANAGEMENT-Home Monitor Managed by Exception    Buck JONES Sinclair 77 year old male is on warfarin with therapeutic INR result. (Goal INR 2.0-3.0)    Recent labs: (last 7 days)     05/24/22  0000   INR 2.70       Previous INR was Therapeutic  Medication, diet, health changes since last INR:chart reviewed; none identified  Contacted within the last 12 weeks by phone on 4/19/22      LUCAS     Buck was NOT contacted regarding therapeutic result today per home monitoring policy manage by exception agreement.   Current warfarin dose is to be continued:     Summary  As of 5/24/2022    Full warfarin instructions:  5 mg every Mon, Thu; 2.5 mg all other days   Next INR check:  5/31/2022               Gisele Saul RN  Anticoagulation Clinic  5/24/2022    _______________________________________________________________________     Anticoagulation Episode Summary     Current INR goal:  2.0-3.0   TTR:  86.6 % (1 y)   Target end date:  Indefinite   Send INR reminders to:  LARISA RICHARDS    Indications    Persistent Atrial Fibrillation [I48.91]  Persistent atrial fibrillation (H) [I48.19]           Comments:  Home monitor ( [employer] )managed by exception         Anticoagulation Care Providers     Provider Role Specialty Phone number    Erica Hansen APRN CNP Referring Cardiovascular Disease [phone]    Everett Renee MD  Cardiovascular Disease [phone]

## 2022-05-31 ENCOUNTER — ANTICOAGULATION THERAPY VISIT (OUTPATIENT)
Dept: ANTICOAGULATION | Facility: CLINIC | Age: 77
End: 2022-05-31
Payer: COMMERCIAL

## 2022-05-31 DIAGNOSIS — I48.91 ATRIAL FIBRILLATION (H): Primary | ICD-10-CM

## 2022-05-31 DIAGNOSIS — I48.19 PERSISTENT ATRIAL FIBRILLATION (H): ICD-10-CM

## 2022-05-31 LAB — INR HOME MONITORING: 3.3 (ref 2–3)

## 2022-05-31 NOTE — PROGRESS NOTES
ANTICOAGULATION MANAGEMENT     Buck Sinclair 77 year old male is on warfarin with supratherapeutic INR result. (Goal INR 2.0-3.0)    Recent labs: (last 7 days)     05/31/22  0000   INR 3.30*       ASSESSMENT     Source(s): Chart Review  Previous INR was Therapeutic last 2(+) visits  Medication, diet, health changes since last INR chart reviewed; none identified           PLAN     Unable to reach Buck today.    Left message to take reduced dose of warfarin, 1.25 mg tonight. Request call back for assessment.    Follow up required to confirm warfarin dose taken and assess for changes    Fatoumata Ochoa RN  Anticoagulation Clinic  5/31/2022

## 2022-06-01 ENCOUNTER — ANTICOAGULATION THERAPY VISIT (OUTPATIENT)
Dept: ANTICOAGULATION | Facility: CLINIC | Age: 77
End: 2022-06-01
Payer: COMMERCIAL

## 2022-06-01 DIAGNOSIS — I48.19 PERSISTENT ATRIAL FIBRILLATION (H): Primary | ICD-10-CM

## 2022-06-01 NOTE — PROGRESS NOTES
ANTICOAGULATION MANAGEMENT     Buck Sinclair 77 year old male is on warfarin with supratherapeutic INR result. (Goal INR 2.0-3.0)    Recent labs: (last 7 days)     05/31/22  0000   INR 3.30*       ASSESSMENT     Source(s): Chart Review  Previous INR was Therapeutic last 2(+) visits  Medication, diet, health changes since last INR chart reviewed; none identified           PLAN     Recommended plan for no diet, medication or health factor changes affecting INR     Dosing Instructions: partial hold then continue your current warfarin dose with next INR in 1 week       Summary  As of 5/31/2022    Full warfarin instructions:  5/31: 1.25 mg; Otherwise 5 mg every Mon, Thu; 2.5 mg all other days   Next INR check:  6/7/2022             Detailed voice message left for Buck with dosing instructions and follow up date.     Patient to recheck with home meter    Education provided: Please call back if any changes to your diet, medications or how you've been taking warfarin    Plan made per [facility] anticoagulation protocol    Michael Holliday RN  Anticoagulation Clinic  6/1/2022    _______________________________________________________________________     Anticoagulation Episode Summary     Current INR goal:  2.0-3.0   TTR:  85.5 % (1 y)   Target end date:  Indefinite   Send INR reminders to:  LARISA RICAHRDS    Indications    Persistent Atrial Fibrillation [I48.91]  Persistent atrial fibrillation (H) [I48.19]           Comments:  Home monitor ( [employer] )managed by exception         Anticoagulation Care Providers     Provider Role Specialty Phone number    Erica Hansen APRN CNP Referring Cardiovascular Disease [phone]    Everett Renee MD  Cardiovascular Disease [phone]

## 2022-06-02 ENCOUNTER — TELEPHONE (OUTPATIENT)
Dept: CARDIOLOGY | Facility: CLINIC | Age: 77
End: 2022-06-02
Payer: COMMERCIAL

## 2022-06-07 ENCOUNTER — ANTICOAGULATION THERAPY VISIT (OUTPATIENT)
Dept: ANTICOAGULATION | Facility: CLINIC | Age: 77
End: 2022-06-07
Payer: COMMERCIAL

## 2022-06-07 DIAGNOSIS — I48.91 ATRIAL FIBRILLATION (H): Primary | ICD-10-CM

## 2022-06-07 DIAGNOSIS — I48.19 PERSISTENT ATRIAL FIBRILLATION (H): ICD-10-CM

## 2022-06-07 LAB — INR HOME MONITORING: 2.7 (ref 2–3)

## 2022-06-07 NOTE — TELEPHONE ENCOUNTER
Central Prior Authorization Team - Phone: [phone]     Prior Authorization Not Needed per Insurance    Medication: WARFARIN- PA NOT NEEDED  Insurance Company:    Expected CoPay:      Pharmacy Filling the Rx: [id number] - [place] [id number] [facility] [address]    Pharmacy Notified: YesComment:  YES spoke to [facility], stated Rx was transferred to [facility] in [facility]. [phone]. Called there spoke to [facility] who stated Rx was filled 0 copay and patient already picked up. NO PA NEEDED    Patient Notified: YesComment:  PATIENT PICKED UP ALREADY FROM [facility]    Called [place] to verify, agent stated was a WORKCOMP plan and Pharmacy needed call.  Called pharmacy in [facility] stated Rx had been transferred to [facility], called [facility] stated rx was filled for zero copay and was already picked up by patient.   NO PA NEEDED.

## 2022-06-07 NOTE — TELEPHONE ENCOUNTER
Central Prior Authorization Team - Phone: [phone]     PA Initiation    Medication: WARFARIN  Insurance Company:    Pharmacy Filling the Rx: [id number] [place], [place] - [facility] AT [facility] [address]  Filling Pharmacy Phone: [phone]  Filling Pharmacy Fax:    Start Date: 6/7/2022

## 2022-06-07 NOTE — PROGRESS NOTES
ANTICOAGULATION MANAGEMENT     Buck Sinclair 77 year old male is on warfarin with therapeutic INR result. (Goal INR 2.0-3.0)    Recent labs: (last 7 days)     06/07/22  0000   INR 2.70       ASSESSMENT     Source(s): Chart Review  Previous INR was Subtherapeutic  Medication, diet, health changes since last INR chart reviewed; none identified           PLAN     Recommended plan for no diet, medication or health factor changes affecting INR     Dosing Instructions: continue your current warfarin dose with next INR in 1 week       Summary  As of 6/7/2022    Full warfarin instructions:  5 mg every Mon, Thu; 2.5 mg all other days   Next INR check:  6/14/2022             Detailed voice message left for Buck with dosing instructions and follow up date.     Patient to recheck with home meter    Education provided: Please call back if any changes to your diet, medications or how you've been taking warfarin    Plan made per [facility] anticoagulation protocol    Gisele Saul, RN  Anticoagulation Clinic  6/7/2022    _______________________________________________________________________     Anticoagulation Episode Summary     Current INR goal:  2.0-3.0   TTR:  85.4 % (1 y)   Target end date:  Indefinite   Send INR reminders to:  LARISA RICHARDS    Indications    Persistent Atrial Fibrillation [I48.91]  Persistent atrial fibrillation (H) [I48.19]           Comments:  Home monitor ( [employer] )managed by exception         Anticoagulation Care Providers     Provider Role Specialty Phone number    Erica Hansen APRN CNP Referring Cardiovascular Disease [phone]    Everett Renee MD  Cardiovascular Disease [phone]

## 2022-06-14 ENCOUNTER — DOCUMENTATION ONLY (OUTPATIENT)
Dept: ANTICOAGULATION | Facility: CLINIC | Age: 77
End: 2022-06-14
Payer: COMMERCIAL

## 2022-06-14 DIAGNOSIS — I48.91 ATRIAL FIBRILLATION (H): Primary | ICD-10-CM

## 2022-06-14 DIAGNOSIS — I48.19 PERSISTENT ATRIAL FIBRILLATION (H): ICD-10-CM

## 2022-06-14 LAB — INR HOME MONITORING: 2.5 (ref 2–3)

## 2022-06-14 NOTE — PROGRESS NOTES
ANTICOAGULATION  MANAGEMENT-Home Monitor Managed by Exception    Buck JONES Sinclair 77 year old male is on warfarin with therapeutic INR result. (Goal INR 2.0-3.0)    Recent labs: (last 7 days)     06/14/22  0000   INR 2.5       Previous INR was Therapeutic  Medication, diet, health changes since last INR:chart reviewed; none identified  Contacted within the last 12 weeks by phone on 6/7/22      PLAN     Buck was NOT contacted regarding therapeutic result today per home monitoring policy manage by exception agreement.   Current warfarin dose is to be continued:     Summary  As of 6/14/2022    Full warfarin instructions:  5 mg every Mon, Thu; 2.5 mg all other days   Next INR check:  6/21/2022               Gisele Saul RN  Anticoagulation Clinic  6/14/2022    _______________________________________________________________________     Anticoagulation Episode Summary     Current INR goal:  2.0-3.0   TTR:  86.6 % (1 y)   Target end date:  Indefinite   Send INR reminders to:  LARISA RICHARDS    Indications    Persistent Atrial Fibrillation [I48.91]  Persistent atrial fibrillation (H) [I48.19]           Comments:  Home monitor ( [employer] )managed by exception         Anticoagulation Care Providers     Provider Role Specialty Phone number    Erica Hansen APRN CNP Referring Cardiovascular Disease [phone]    Everett Renee MD  Cardiovascular Disease [phone]

## 2022-06-15 ENCOUNTER — TELEPHONE (OUTPATIENT)
Dept: CARDIOLOGY | Facility: CLINIC | Age: 77
End: 2022-06-15
Payer: COMMERCIAL

## 2022-06-15 NOTE — TELEPHONE ENCOUNTER
I spoke with Jacki and she stated that she had a conversation with Buck and she asked that I call follow up with them regarding their oxygen questions.  They are wondering if there is only CO2 retention since he is on 6 lpm and his SpO2 is averaging 91%.  They are going to call the pulmonologist if his SpO2 decreased below 88% at rest for a sustained period of time on the 6 lpm.  Anabell Lemus RN on 6/15/2022 at 2:10 PM

## 2022-06-21 ENCOUNTER — ANTICOAGULATION THERAPY VISIT (OUTPATIENT)
Dept: ANTICOAGULATION | Facility: CLINIC | Age: 77
End: 2022-06-21

## 2022-06-21 DIAGNOSIS — I48.91 ATRIAL FIBRILLATION (H): Primary | ICD-10-CM

## 2022-06-21 DIAGNOSIS — I48.19 PERSISTENT ATRIAL FIBRILLATION (H): ICD-10-CM

## 2022-06-21 LAB — INR HOME MONITORING: 1.7 (ref 2–3)

## 2022-06-21 NOTE — PROGRESS NOTES
ANTICOAGULATION MANAGEMENT     Buckbradley Sinclair 77 year old male is on warfarin with subtherapeutic INR result. (Goal INR 2.0-3.0)    Recent labs: (last 7 days)     06/21/22  0000   INR 1.7*       ASSESSMENT     Source(s): Chart Review  Previous INR was Therapeutic last 2(+) visits  Medication, diet, health changes since last INR chart reviewed; none identified           PLAN     Unable to reach Neela or Buck today.    Left message to continue current dose of warfarin 5 mg tonight. Request call back for assessment.    Follow up required to discuss out of range result     Marina Velazco RN  Anticoagulation Clinic  6/21/2022

## 2022-06-22 NOTE — PROGRESS NOTES
ANTICOAGULATION MANAGEMENT     Buck Sinclair 77 year old male is on warfarin with subtherapeutic INR result. (Goal INR 2.0-3.0)    Recent labs: (last 7 days)     06/21/22  0000   INR 1.7*       ASSESSMENT     Source(s): Chart Review and Patient/Caregiver Call     Warfarin doses taken: Warfarin taken as instructed  Diet: Increased greens/vitamin K in diet; plans to resume previous intake  New illness, injury, or hospitalization: patient has a cold and possibly diarrhea  Medication/supplement changes: None noted  Signs or symptoms of bleeding or clotting: No  Previous INR: Therapeutic last 2(+) visits  Additional findings: None       PLAN     Recommended plan for temporary change(s) affecting INR     Dosing Instructions: continue your current warfarin dose with next INR in 1 week       Summary  As of 6/21/2022    Full warfarin instructions:  5 mg every Mon, Thu; 2.5 mg all other days   Next INR check:  6/28/2022             Telephone call with  Neela who verbalizes understanding and agrees to plan    Patient to recheck with home meter    Education provided: Importance of consistent vitamin K intake, Impact of vitamin K foods on INR and Vitamin K content of foods    Plan made per [facility] anticoagulation protocol    Marina Velazco, RN  Anticoagulation Clinic  6/22/2022    _______________________________________________________________________     Anticoagulation Episode Summary     Current INR goal:  2.0-3.0   TTR:  85.7 % (1 y)   Target end date:  Indefinite   Send INR reminders to:  LARISA RICHARDS    Indications    Persistent Atrial Fibrillation [I48.91]  Persistent atrial fibrillation (H) [I48.19]           Comments:  Home monitor ( [employer] )managed by exception         Anticoagulation Care Providers     Provider Role Specialty Phone number    Erica Hansen APRN CNP Referring Cardiovascular Disease [phone]    Everett Renee MD  Cardiovascular Disease [phone]

## 2022-06-23 ENCOUNTER — TELEPHONE (OUTPATIENT)
Dept: CARDIOLOGY | Facility: CLINIC | Age: 77
End: 2022-06-23

## 2022-06-23 DIAGNOSIS — I48.91 ATRIAL FIBRILLATION (H): ICD-10-CM

## 2022-06-23 RX ORDER — WARFARIN SODIUM 2.5 MG/1
TABLET ORAL
Qty: 116 TABLET | Refills: 1 | Status: ON HOLD | OUTPATIENT
Start: 2022-06-23 | End: 2023-01-01

## 2022-06-23 RX ORDER — WARFARIN SODIUM 2.5 MG/1
TABLET ORAL
Qty: 110 TABLET | Refills: 1 | Status: SHIPPED | OUTPATIENT
Start: 2022-06-23 | End: 2022-06-23

## 2022-06-23 NOTE — TELEPHONE ENCOUNTER
Last INR: 6/21:  5 mg every Mon, Thu; 2.5 mg all other days    Prescription approved per [facility] Refill Protocol.  Thanks! Fay Lewis RN

## 2022-06-23 NOTE — TELEPHONE ENCOUNTER
Fax received from [facility] requesting refill of warfarin 2.5 mg tablets.    Pharmacy pended.    WOOD DavisN, RN  Anticoagulation Clinic

## 2022-06-24 ENCOUNTER — ANTICOAGULATION THERAPY VISIT (OUTPATIENT)
Dept: ANTICOAGULATION | Facility: CLINIC | Age: 77
End: 2022-06-24

## 2022-06-24 DIAGNOSIS — I48.19 PERSISTENT ATRIAL FIBRILLATION (H): Primary | ICD-10-CM

## 2022-06-24 LAB — INR HOME MONITORING: 2.1 (ref 2–3)

## 2022-06-24 NOTE — PROGRESS NOTES
ANTICOAGULATION MANAGEMENT     Buck Sinclair 77 year old male is on warfarin with therapeutic INR result. (Goal INR 2.0-3.0)    Recent labs: (last 7 days)     06/24/22  0000   INR 2.1       ASSESSMENT     Source(s): Chart Review and Patient/Caregiver Call     Warfarin doses taken: Warfarin taken as instructed  Diet: No new diet changes identified  New illness, injury, or hospitalization: No  Medication/supplement changes: None noted  Signs or symptoms of bleeding or clotting: No  Previous INR: Subtherapeutic  Additional findings: None       PLAN     Recommended plan for no diet, medication or health factor changes affecting INR     Dosing Instructions: continue your current warfarin dose with next INR in 4 days       Summary  As of 6/24/2022    Full warfarin instructions:  5 mg every Mon, Thu; 2.5 mg all other days   Next INR check:  6/28/2022             Telephone call with Buck who verbalizes understanding and agrees to plan    Patient to recheck with home meter    Education provided: Please call back if any changes to your diet, medications or how you've been taking warfarin    Plan made per [facility] anticoagulation protocol    Leana Nayak, RN  Anticoagulation Clinic  6/24/2022    _______________________________________________________________________     Anticoagulation Episode Summary     Current INR goal:  2.0-3.0   TTR:  85.3 % (1 y)   Target end date:  Indefinite   Send INR reminders to:  LARISA RICHARDS    Indications    Persistent Atrial Fibrillation [I48.91]  Persistent atrial fibrillation (H) [I48.19]           Comments:  Home monitor ( [employer] )managed by exception         Anticoagulation Care Providers     Provider Role Specialty Phone number    Erica Hansen APRN CNP Referring Cardiovascular Disease [phone]    Everett Renee MD  Cardiovascular Disease [phone]

## 2022-06-28 ENCOUNTER — APPOINTMENT (OUTPATIENT)
Dept: LAB | Facility: CLINIC | Age: 77
End: 2022-06-28
Payer: COMMERCIAL

## 2022-06-28 ENCOUNTER — ANCILLARY PROCEDURE (OUTPATIENT)
Dept: CT IMAGING | Facility: CLINIC | Age: 77
End: 2022-06-28
Attending: INTERNAL MEDICINE

## 2022-06-28 ENCOUNTER — DOCUMENTATION ONLY (OUTPATIENT)
Dept: ANTICOAGULATION | Facility: CLINIC | Age: 77
End: 2022-06-28

## 2022-06-28 DIAGNOSIS — I48.19 PERSISTENT ATRIAL FIBRILLATION (H): Primary | ICD-10-CM

## 2022-06-28 DIAGNOSIS — Z00.6 EXAMINATION OF PARTICIPANT OR CONTROL IN CLINICAL RESEARCH: Primary | ICD-10-CM

## 2022-06-28 DIAGNOSIS — Z00.6 EXAMINATION OF PARTICIPANT OR CONTROL IN CLINICAL RESEARCH: ICD-10-CM

## 2022-06-28 LAB — INR HOME MONITORING: 2.6 (ref 2–3)

## 2022-06-28 PROCEDURE — 94729 DIFFUSING CAPACITY: CPT | Performed by: INTERNAL MEDICINE

## 2022-06-28 PROCEDURE — 71250 CT THORAX DX C-: CPT | Mod: GC | Performed by: RADIOLOGY

## 2022-06-28 PROCEDURE — 94726 PLETHYSMOGRAPHY LUNG VOLUMES: CPT | Performed by: INTERNAL MEDICINE

## 2022-06-28 PROCEDURE — 94375 RESPIRATORY FLOW VOLUME LOOP: CPT | Performed by: INTERNAL MEDICINE

## 2022-06-28 NOTE — PROGRESS NOTES
ANTICOAGULATION  MANAGEMENT-Home Monitor Managed by Exception    Buck JONES Sinclair 77 year old male is on warfarin with therapeutic INR result. (Goal INR 2.0-3.0)    Recent labs: (last 7 days     06/28/22  0000   INR 2.6       Previous INR was Therapeutic  Medication, diet, health changes since last INR:chart reviewed; none identified  Contacted within the last 12 weeks by phone on 6/21/22      LUCAS     Buck was NOT contacted regarding therapeutic result today per home monitoring policy manage by exception agreement.   Current warfarin dose is to be continued:     Summary  As of 6/28/2022    Full warfarin instructions:  5 mg every Mon, Thu; 2.5 mg all other days   Next INR check:  7/5/2022               Tran Crews RN  Anticoagulation Clinic  6/28/2022    _______________________________________________________________________     Anticoagulation Episode Summary     Current INR goal:  2.0-3.0   TTR:  85.3 % (1 y)   Target end date:  Indefinite   Send INR reminders to:  LARISA RICHARDS    Indications    Persistent Atrial Fibrillation [I48.91]  Persistent atrial fibrillation (H) [I48.19]           Comments:  Home monitor ( [employer] )managed by exception         Anticoagulation Care Providers     Provider Role Specialty Phone number    Erica Hansen APRN CNP Referring Cardiovascular Disease [phone]    Everett Renee MD  Cardiovascular Disease [phone]

## 2022-06-29 LAB
DLCOUNC-%PRED-PRE: 28 %
DLCOUNC-PRE: 8.33 ML/MIN/MMHG
DLCOUNC-PRED: 28.8 ML/MIN/MMHG
ERV-%PRED-PRE: 115 %
ERV-PRE: 1.12 L
ERV-PRED: 0.97 L
EXPTIME-PRE: 10.24 SEC
FEF2575-%PRED-PRE: 36 %
FEF2575-PRE: 0.88 L/SEC
FEF2575-PRED: 2.41 L/SEC
FEFMAX-%PRED-PRE: 61 %
FEFMAX-PRE: 5.43 L/SEC
FEFMAX-PRED: 8.77 L/SEC
FEV1-%PRED-PRE: 63 %
FEV1-PRE: 2.19 L
FEV1FEV6-PRE: 64 %
FEV1FEV6-PRED: 77 %
FEV1FVC-PRE: 59 %
FEV1FVC-PRED: 74 %
FEV1SVC-PRE: 58 %
FEV1SVC-PRED: 63 %
FIFMAX-PRE: 5.2 L/SEC
FRCPLETH-%PRED-PRE: 106 %
FRCPLETH-PRE: 4.31 L
FRCPLETH-PRED: 4.06 L
FVC-%PRED-PRE: 78 %
FVC-PRE: 3.7 L
FVC-PRED: 4.72 L
IC-%PRED-PRE: 59 %
IC-PRE: 2.67 L
IC-PRED: 4.51 L
RVPLETH-%PRED-PRE: 107 %
RVPLETH-PRE: 3.18 L
RVPLETH-PRED: 2.96 L
TLCPLETH-%PRED-PRE: 85 %
TLCPLETH-PRE: 6.97 L
TLCPLETH-PRED: 8.14 L
VA-%PRED-PRE: 71 %
VA-PRE: 5.36 L
VC-%PRED-PRE: 69 %
VC-PRE: 3.79 L
VC-PRED: 5.48 L

## 2022-07-05 ENCOUNTER — DOCUMENTATION ONLY (OUTPATIENT)
Dept: ANTICOAGULATION | Facility: CLINIC | Age: 77
End: 2022-07-05

## 2022-07-05 DIAGNOSIS — Z00.6 EXAMINATION OF PARTICIPANT OR CONTROL IN CLINICAL RESEARCH: Primary | ICD-10-CM

## 2022-07-05 DIAGNOSIS — I48.91 ATRIAL FIBRILLATION (H): Primary | ICD-10-CM

## 2022-07-05 DIAGNOSIS — I48.19 PERSISTENT ATRIAL FIBRILLATION (H): ICD-10-CM

## 2022-07-05 LAB — INR HOME MONITORING: 3 (ref 2–3)

## 2022-07-05 NOTE — PROGRESS NOTES
ANTICOAGULATION  MANAGEMENT-Home Monitor Managed by Exception    Buck JONES Sinclair 77 year old male is on warfarin with therapeutic INR result. (Goal INR 2.0-3.0)    Recent labs: (last 7 days)     07/05/22  0000   INR 3.0       Previous INR was Therapeutic  Medication, diet, health changes since last INR:chart reviewed; none identified  Contacted within the last 12 weeks by phone on 6/21/22      LUCAS     Buck was NOT contacted regarding therapeutic result today per home monitoring policy manage by exception agreement.   Current warfarin dose is to be continued:     Summary  As of 7/5/2022    Full warfarin instructions:  5 mg every Mon, Thu; 2.5 mg all other days   Next INR check:  7/12/2022               Nasra Oliveira RN  Anticoagulation Clinic  7/5/2022    _______________________________________________________________________     Anticoagulation Episode Summary     Current INR goal:  2.0-3.0   TTR:  85.3 % (1 y)   Target end date:  Indefinite   Send INR reminders to:  LARISA RICHARDS    Indications    Persistent Atrial Fibrillation [I48.91]  Persistent atrial fibrillation (H) [I48.19]           Comments:  Home monitor ( [employer] )managed by exception         Anticoagulation Care Providers     Provider Role Specialty Phone number    Erica Hansen APRN CNP Referring Cardiovascular Disease [phone]    Everett Renee MD  Cardiovascular Disease [phone]

## 2022-07-07 ENCOUNTER — LAB (OUTPATIENT)
Dept: LAB | Facility: CLINIC | Age: 77
End: 2022-07-07
Payer: COMMERCIAL

## 2022-07-07 ENCOUNTER — RESEARCH ENCOUNTER (OUTPATIENT)
Dept: CARDIOLOGY | Facility: CLINIC | Age: 77
End: 2022-07-07

## 2022-07-07 ENCOUNTER — ANCILLARY PROCEDURE (OUTPATIENT)
Dept: CT IMAGING | Facility: CLINIC | Age: 77
End: 2022-07-07
Attending: INTERNAL MEDICINE

## 2022-07-07 DIAGNOSIS — R06.09 DOE (DYSPNEA ON EXERTION): Primary | ICD-10-CM

## 2022-07-07 DIAGNOSIS — Z00.6 EXAMINATION OF PARTICIPANT OR CONTROL IN CLINICAL RESEARCH: ICD-10-CM

## 2022-07-07 DIAGNOSIS — I42.9 CARDIOMYOPATHY (H): ICD-10-CM

## 2022-07-07 DIAGNOSIS — Z00.6 EXAMINATION OF PARTICIPANT OR CONTROL IN CLINICAL RESEARCH: Primary | ICD-10-CM

## 2022-07-07 PROCEDURE — 94375 RESPIRATORY FLOW VOLUME LOOP: CPT | Performed by: INTERNAL MEDICINE

## 2022-07-07 PROCEDURE — 94726 PLETHYSMOGRAPHY LUNG VOLUMES: CPT | Performed by: INTERNAL MEDICINE

## 2022-07-07 PROCEDURE — 94729 DIFFUSING CAPACITY: CPT | Performed by: INTERNAL MEDICINE

## 2022-07-07 PROCEDURE — 71250 CT THORAX DX C-: CPT | Mod: GC | Performed by: RADIOLOGY

## 2022-07-07 PROCEDURE — 36415 COLL VENOUS BLD VENIPUNCTURE: CPT | Performed by: PATHOLOGY

## 2022-07-08 LAB
DLCOUNC-%PRED-PRE: 32 %
DLCOUNC-PRE: 9.49 ML/MIN/MMHG
DLCOUNC-PRED: 28.8 ML/MIN/MMHG
ERV-%PRED-PRE: 101 %
ERV-PRE: 0.97 L
ERV-PRED: 0.95 L
EXPTIME-PRE: 11.45 SEC
FEF2575-%PRED-PRE: 34 %
FEF2575-PRE: 0.82 L/SEC
FEF2575-PRED: 2.41 L/SEC
FEFMAX-%PRED-PRE: 67 %
FEFMAX-PRE: 5.91 L/SEC
FEFMAX-PRED: 8.77 L/SEC
FEV1-%PRED-PRE: 62 %
FEV1-PRE: 2.18 L
FEV1FEV6-PRE: 64 %
FEV1FEV6-PRED: 77 %
FEV1FVC-PRE: 59 %
FEV1FVC-PRED: 74 %
FEV1SVC-PRE: 60 %
FEV1SVC-PRED: 63 %
FIFMAX-PRE: 5.06 L/SEC
FRCPLETH-%PRED-PRE: 96 %
FRCPLETH-PRE: 3.92 L
FRCPLETH-PRED: 4.06 L
FVC-%PRED-PRE: 78 %
FVC-PRE: 3.68 L
FVC-PRED: 4.72 L
IC-%PRED-PRE: 58 %
IC-PRE: 2.64 L
IC-PRED: 4.52 L
RVPLETH-%PRED-PRE: 99 %
RVPLETH-PRE: 2.95 L
RVPLETH-PRED: 2.96 L
TLCPLETH-%PRED-PRE: 80 %
TLCPLETH-PRE: 6.56 L
TLCPLETH-PRED: 8.14 L
VA-%PRED-PRE: 71 %
VA-PRE: 5.33 L
VC-%PRED-PRE: 65 %
VC-PRE: 3.61 L
VC-PRED: 5.48 L

## 2022-07-12 ENCOUNTER — TELEPHONE (OUTPATIENT)
Dept: FAMILY MEDICINE | Facility: CLINIC | Age: 77
End: 2022-07-12

## 2022-07-12 ENCOUNTER — ANTICOAGULATION THERAPY VISIT (OUTPATIENT)
Dept: ANTICOAGULATION | Facility: CLINIC | Age: 77
End: 2022-07-12

## 2022-07-12 DIAGNOSIS — I48.91 ATRIAL FIBRILLATION (H): Primary | ICD-10-CM

## 2022-07-12 DIAGNOSIS — I48.19 PERSISTENT ATRIAL FIBRILLATION (H): ICD-10-CM

## 2022-07-12 LAB — INR HOME MONITORING: 3.3 (ref 2–3)

## 2022-07-12 NOTE — PROGRESS NOTES
ANTICOAGULATION MANAGEMENT     Buck Sinclair 77 year old male is on warfarin with supratherapeutic INR result. (Goal INR 2.0-3.0)    Recent labs: (last 7 days)     07/12/22  0000   INR 3.3*       ASSESSMENT     Source(s): Chart Review and Patient/Caregiver Call     Warfarin doses taken: Warfarin taken as instructed  Diet: Decreased greens/vitamin K in diet; plans to resume previous intake and Change in alcohol intake may be affecting INR. had company over the weekend and had increased alcohol . Plans to have a big salad for supper tonight.   New illness, injury, or hospitalization: No  Medication/supplement changes: None noted  Signs or symptoms of bleeding or clotting: No  Previous INR: Therapeutic last 2(+) visits  Additional findings: None       PLAN     Recommended plan for temporary change(s) affecting INR     Dosing Instructions: continue your current warfarin dose with next INR in 2 weeks       Summary  As of 7/12/2022    Full warfarin instructions:  5 mg every Mon, Thu; 2.5 mg all other days   Next INR check:  7/26/2022             Telephone call with  spouse who verbalizes understanding and agrees to plan    Patient to recheck with home meter    Education provided: Goal range and significance of current result and Monitoring for bleeding signs and symptoms    Plan made per [facility] anticoagulation protocol    Suzanna Corea RN  Anticoagulation Clinic  7/12/2022    _______________________________________________________________________     Anticoagulation Episode Summary     Current INR goal:  2.0-3.0   TTR:  85.0 % (1 y)   Target end date:  Indefinite   Send INR reminders to:  [facility]    Indications    Persistent Atrial Fibrillation [I48.91]  Persistent atrial fibrillation (H) [I48.19]           Comments:  Home monitor ( [employer] )managed by exception         Anticoagulation Care Providers     Provider Role Specialty Phone number    Erica Hansen, GIBRAN CNP Referring Cardiovascular Disease  [phone]    Everett Renee MD  Cardiovascular Disease [phone]

## 2022-07-12 NOTE — TELEPHONE ENCOUNTER
Reason for Call:  INR follow up    Detailed comments: Wife is returning call to Suzanna today. Please call again.    Phone Number Patient can be reached at: Home number on file [phone] (home)    Best Time: any    Can we leave a detailed message on this number? YES    Call taken on 7/12/2022 at 4:11 PM by Josy Dillard

## 2022-07-19 ENCOUNTER — ANTICOAGULATION THERAPY VISIT (OUTPATIENT)
Dept: ANTICOAGULATION | Facility: CLINIC | Age: 77
End: 2022-07-19

## 2022-07-19 DIAGNOSIS — I48.19 PERSISTENT ATRIAL FIBRILLATION (H): ICD-10-CM

## 2022-07-19 DIAGNOSIS — I48.91 ATRIAL FIBRILLATION (H): Primary | ICD-10-CM

## 2022-07-19 LAB — INR HOME MONITORING: 3 (ref 2–3)

## 2022-07-19 NOTE — PROGRESS NOTES
ANTICOAGULATION MANAGEMENT     Buck Sinclair 77 year old male is on warfarin with therapeutic INR result. (Goal INR 2.0-3.0)    Recent labs: (last 7 days)     07/19/22  0000   INR 3.0       ASSESSMENT     Source(s): Chart Review and Patient/Caregiver Call     Warfarin doses taken: Warfarin taken as instructed  Diet: No new diet changes identified  New illness, injury, or hospitalization: No  Medication/supplement changes: None noted  Signs or symptoms of bleeding or clotting: No  Previous INR: Supratherapeutic  Additional findings: None       PLAN     Recommended plan for no diet, medication or health factor changes affecting INR     Dosing Instructions: continue your current warfarin dose with next INR in 1 week       Summary  As of 7/19/2022    Full warfarin instructions:  5 mg every Mon, Thu; 2.5 mg all other days   Next INR check:  7/26/2022             Telephone call with Neela who verbalizes understanding and agrees to plan    Patient to recheck with home meter    Education provided: Please call back if any changes to your diet, medications or how you've been taking warfarin, Monitoring for bleeding signs and symptoms, Monitoring for clotting signs and symptoms and Resume manage by exception with home monitor. Continue to submit INR results to home monitor company.You will only be called when your result is out of range. Please call and notify [facility] if new medication started, dose missed, signs or symptoms of bleeding or clotting, or a surgery/procedure is scheduled.    Plan made per [facility] anticoagulation protocol    Gisele Saul RN  Anticoagulation Clinic  7/19/2022    _______________________________________________________________________     Anticoagulation Episode Summary     Current INR goal:  2.0-3.0   TTR:  84.4 % (1 y)   Target end date:  Indefinite   Send INR reminders to:  LARISA RICHARDS    Indications    Persistent Atrial Fibrillation [I48.91]  Persistent atrial fibrillation (H) [I48.19]            Comments:  Home monitor ( [employer] )managed by exception         Anticoagulation Care Providers     Provider Role Specialty Phone number    Erica Hansen APRN CNP Referring Cardiovascular Disease [phone]    Everett Renee MD  Cardiovascular Disease [phone]

## 2022-07-26 ENCOUNTER — DOCUMENTATION ONLY (OUTPATIENT)
Dept: ANTICOAGULATION | Facility: CLINIC | Age: 77
End: 2022-07-26

## 2022-07-26 DIAGNOSIS — I48.19 PERSISTENT ATRIAL FIBRILLATION (H): Primary | ICD-10-CM

## 2022-07-26 LAB — INR HOME MONITORING: 3 (ref 2–3)

## 2022-07-26 NOTE — PROGRESS NOTES
ANTICOAGULATION  MANAGEMENT-Home Monitor Managed by Exception    Buck JONES Sinclair 77 year old male is on warfarin with therapeutic INR result. (Goal INR 2.0-3.0)    Recent labs: (last 7 days)     07/26/22  0000   INR 3.0       Previous INR was Therapeutic  Medication, diet, health changes since last INR:chart reviewed; none identified  Contacted within the last 12 weeks by phone on 7/19/22      LUCAS     Buck was NOT contacted regarding therapeutic result today per home monitoring policy manage by exception agreement.   Current warfarin dose is to be continued:     Summary  As of 7/26/2022    Full warfarin instructions:  5 mg every Mon, Thu; 2.5 mg all other days   Next INR check:  8/2/2022               Leana Nayak RN  Anticoagulation Clinic  7/26/2022    _______________________________________________________________________     Anticoagulation Episode Summary     Current INR goal:  2.0-3.0   TTR:  84.4 % (1 y)   Target end date:  Indefinite   Send INR reminders to:  LARISA RICHARDS    Indications    Persistent Atrial Fibrillation [I48.91]  Persistent atrial fibrillation (H) [I48.19]           Comments:  Home monitor ( [employer] )managed by exception         Anticoagulation Care Providers     Provider Role Specialty Phone number    Erica Hansen APRN CNP Referring Cardiovascular Disease [phone]    Everett Renee MD  Cardiovascular Disease [phone]

## 2022-08-01 DIAGNOSIS — Z00.6 EXAMINATION OF PARTICIPANT OR CONTROL IN CLINICAL RESEARCH: Primary | ICD-10-CM

## 2022-08-02 ENCOUNTER — DOCUMENTATION ONLY (OUTPATIENT)
Dept: ANTICOAGULATION | Facility: CLINIC | Age: 77
End: 2022-08-02

## 2022-08-02 DIAGNOSIS — I48.19 PERSISTENT ATRIAL FIBRILLATION (H): ICD-10-CM

## 2022-08-02 DIAGNOSIS — I48.91 ATRIAL FIBRILLATION (H): Primary | ICD-10-CM

## 2022-08-02 LAB — INR HOME MONITORING: 2.9 (ref 2–3)

## 2022-08-02 NOTE — PROGRESS NOTES
ANTICOAGULATION  MANAGEMENT-Home Monitor Managed by Exception    Buck JONES Sinclair 77 year old male is on warfarin with therapeutic INR result. (Goal INR 2.0-3.0)    Recent labs: (last 7 days)     08/02/22  0000   INR 2.9       Previous INR was Therapeutic  Medication, diet, health changes since last INR:chart reviewed; none identified  Contacted within the last 12 weeks by phone on 7/19/22      LUCAS     Buck was NOT contacted regarding therapeutic result today per home monitoring policy manage by exception agreement.   Current warfarin dose is to be continued:     Summary  As of 8/2/2022    Full warfarin instructions:  5 mg every Mon, Thu; 2.5 mg all other days   Next INR check:  8/9/2022               Fatoumata Ochoa RN  Anticoagulation Clinic  8/2/2022    _______________________________________________________________________     Anticoagulation Episode Summary     Current INR goal:  2.0-3.0   TTR:  84.4 % (1 y)   Target end date:  Indefinite   Send INR reminders to:  LARISA RICHARDS    Indications    Persistent Atrial Fibrillation [I48.91]  Persistent atrial fibrillation (H) [I48.19]           Comments:  Home monitor ( [employer] )managed by exception         Anticoagulation Care Providers     Provider Role Specialty Phone number    Erica Hansen APRN CNP Referring Cardiovascular Disease [phone]    Everett Renee MD  Cardiovascular Disease [phone]

## 2022-08-09 ENCOUNTER — DOCUMENTATION ONLY (OUTPATIENT)
Dept: ANTICOAGULATION | Facility: CLINIC | Age: 77
End: 2022-08-09

## 2022-08-09 DIAGNOSIS — I48.19 PERSISTENT ATRIAL FIBRILLATION (H): Primary | ICD-10-CM

## 2022-08-09 LAB — INR HOME MONITORING: 2.4 (ref 2–3)

## 2022-08-09 NOTE — PROGRESS NOTES
ANTICOAGULATION  MANAGEMENT-Home Monitor Managed by Exception    Buck JONES Sinclair 77 year old male is on warfarin with therapeutic INR result. (Goal INR 2.0-3.0)    Recent labs: (last 7 days)     08/09/22  0000   INR 2.4       Previous INR was Therapeutic  Medication, diet, health changes since last INR:chart reviewed; none identified  Contacted within the last 12 weeks by phone on 7/19/22      LUCAS     Buck was NOT contacted regarding therapeutic result today per home monitoring policy manage by exception agreement.   Current warfarin dose is to be continued:     Summary  As of 8/9/2022    Full warfarin instructions:  5 mg every Mon, Thu; 2.5 mg all other days   Next INR check:  8/16/2022               Leana Nayak RN  Anticoagulation Clinic  8/9/2022    _______________________________________________________________________     Anticoagulation Episode Summary     Current INR goal:  2.0-3.0   TTR:  84.4 % (1 y)   Target end date:  Indefinite   Send INR reminders to:  LARISA RICHARDS    Indications    Persistent Atrial Fibrillation [I48.91]  Persistent atrial fibrillation (H) [I48.19]           Comments:  Home monitor ( [employer] )managed by exception         Anticoagulation Care Providers     Provider Role Specialty Phone number    Erica Hansen APRN CNP Referring Cardiovascular Disease [phone]    Everett Renee MD  Cardiovascular Disease [phone]

## 2022-08-16 ENCOUNTER — DOCUMENTATION ONLY (OUTPATIENT)
Dept: ANTICOAGULATION | Facility: CLINIC | Age: 77
End: 2022-08-16

## 2022-08-16 DIAGNOSIS — I48.91 ATRIAL FIBRILLATION (H): Primary | ICD-10-CM

## 2022-08-16 DIAGNOSIS — I48.19 PERSISTENT ATRIAL FIBRILLATION (H): ICD-10-CM

## 2022-08-16 LAB — INR HOME MONITORING: 2.9 (ref 2–3)

## 2022-08-16 NOTE — PROGRESS NOTES
ANTICOAGULATION  MANAGEMENT-Home Monitor Managed by Exception    Buck JONES Sinclair 77 year old male is on warfarin with therapeutic INR result. (Goal INR 2.0-3.0)    Recent labs: (last 7 days)     08/16/22  0000   INR 2.9       Previous INR was Therapeutic  Medication, diet, health changes since last INR:chart reviewed; none identified  Contacted within the last 12 weeks by phone on   7/19/22    LUCAS     Buck was NOT contacted regarding therapeutic result today per home monitoring policy manage by exception agreement.   Current warfarin dose is to be continued:     Summary  As of 8/16/2022    Full warfarin instructions:  5 mg every Mon, Thu; 2.5 mg all other days   Next INR check:  8/30/2022               Desire Cortez RN  Anticoagulation Clinic  8/16/2022    _______________________________________________________________________     Anticoagulation Episode Summary     Current INR goal:  2.0-3.0   TTR:  84.4 % (1 y)   Target end date:  Indefinite   Send INR reminders to:  LARISA RICHARDS    Indications    Persistent Atrial Fibrillation [I48.91]  Persistent atrial fibrillation (H) [I48.19]           Comments:  Home monitor ( [employer] )managed by exception         Anticoagulation Care Providers     Provider Role Specialty Phone number    Erica Hansen APRN CNP Referring Cardiovascular Disease [phone]    Everett Renee MD  Cardiovascular Disease [phone]

## 2022-08-17 ENCOUNTER — LAB (OUTPATIENT)
Dept: LAB | Facility: CLINIC | Age: 77
End: 2022-08-17
Payer: COMMERCIAL

## 2022-08-17 ENCOUNTER — TELEPHONE (OUTPATIENT)
Dept: CARDIOLOGY | Facility: CLINIC | Age: 77
End: 2022-08-17

## 2022-08-17 DIAGNOSIS — Z00.6 EXAMINATION OF PARTICIPANT OR CONTROL IN CLINICAL RESEARCH: Primary | ICD-10-CM

## 2022-08-17 DIAGNOSIS — Z00.6 EXAMINATION OF PARTICIPANT OR CONTROL IN CLINICAL RESEARCH: ICD-10-CM

## 2022-08-17 DIAGNOSIS — R06.09 DOE (DYSPNEA ON EXERTION): ICD-10-CM

## 2022-08-17 DIAGNOSIS — I42.9 CARDIOMYOPATHY (H): Primary | ICD-10-CM

## 2022-08-17 PROCEDURE — 94726 PLETHYSMOGRAPHY LUNG VOLUMES: CPT | Performed by: INTERNAL MEDICINE

## 2022-08-17 PROCEDURE — 94729 DIFFUSING CAPACITY: CPT | Performed by: INTERNAL MEDICINE

## 2022-08-17 PROCEDURE — 36415 COLL VENOUS BLD VENIPUNCTURE: CPT | Performed by: PATHOLOGY

## 2022-08-17 PROCEDURE — 94375 RESPIRATORY FLOW VOLUME LOOP: CPT | Performed by: INTERNAL MEDICINE

## 2022-08-18 LAB
DLCOUNC-%PRED-PRE: 30 %
DLCOUNC-PRE: 8.74 ML/MIN/MMHG
DLCOUNC-PRED: 28.8 ML/MIN/MMHG
ERV-%PRED-PRE: 140 %
ERV-PRE: 1.36 L
ERV-PRED: 0.97 L
EXPTIME-PRE: 9.37 SEC
FEF2575-%PRED-PRE: 34 %
FEF2575-PRE: 0.83 L/SEC
FEF2575-PRED: 2.41 L/SEC
FEFMAX-%PRED-PRE: 77 %
FEFMAX-PRE: 6.78 L/SEC
FEFMAX-PRED: 8.77 L/SEC
FEV1-%PRED-PRE: 60 %
FEV1-PRE: 2.11 L
FEV1FEV6-PRE: 62 %
FEV1FEV6-PRED: 77 %
FEV1FVC-PRE: 58 %
FEV1FVC-PRED: 74 %
FEV1SVC-PRE: 56 %
FEV1SVC-PRED: 63 %
FIFMAX-PRE: 6.1 L/SEC
FVC-%PRED-PRE: 77 %
FVC-PRE: 3.65 L
FVC-PRED: 4.72 L
IC-%PRED-PRE: 53 %
IC-PRE: 2.44 L
IC-PRED: 4.51 L
VA-%PRED-PRE: 72 %
VA-PRE: 5.4 L
VC-%PRED-PRE: 69 %
VC-PRE: 3.79 L
VC-PRED: 5.48 L

## 2022-08-23 ENCOUNTER — ANTICOAGULATION THERAPY VISIT (OUTPATIENT)
Dept: ANTICOAGULATION | Facility: CLINIC | Age: 77
End: 2022-08-23

## 2022-08-23 DIAGNOSIS — I48.19 PERSISTENT ATRIAL FIBRILLATION (H): Primary | ICD-10-CM

## 2022-08-23 LAB — INR HOME MONITORING: 4.1 (ref 2–3)

## 2022-08-23 NOTE — PROGRESS NOTES
ANTICOAGULATION MANAGEMENT     Buck Sinclair 77 year old male is on warfarin with supratherapeutic INR result. (Goal INR 2.0-3.0)    Recent labs: (last 7 days)     08/23/22  0000   INR 4.1*       ASSESSMENT     Source(s): Chart Review and Patient/Caregiver Call     Warfarin doses taken: Warfarin taken as instructed  Diet: Decreased greens/vitamin K in diet; plans to resume previous intake  New illness, injury, or hospitalization: No  Medication/supplement changes: Patient completed trial of Treprostinil. Patietn not aware of actual med or placebo taken in Trial.  Signs or symptoms of bleeding or clotting: No  Previous INR: Therapeutic last 2(+) visits  Additional findings: Patient is at cabin until labor day. Stove not working, waiting for replacement end of week. This had affected patient greens intake. Patient will hold todays dose and take 2 servings of greens over the upcoming week. No additional factors identified to consider for supratherapeutic INR.         PLAN     Recommended plan for temporary change(s) affecting INR     Dosing Instructions: 8/23 HOLD, then resume current maintenance dose  with next INR in 1 week       Summary  As of 8/23/2022    Full warfarin instructions:  8/23: Hold; Otherwise 5 mg every Mon, Thu; 2.5 mg all other days   Next INR check:  8/30/2022             Telephone call with Neela who agrees to plan and repeated back plan correctly    Patient to recheck with home meter. Patient has 3 test strips remaining. Strips ordered,patient may not receive for 3 weeks.    Education provided: Please call back if any changes to your diet, medications or how you've been taking warfarin, Impact of vitamin K foods on INR and Goal range and significance of current result    Plan made per [facility] anticoagulation protocol    Tran Crews, RN  Anticoagulation Clinic  8/23/2022    _______________________________________________________________________     Anticoagulation Episode Summary     Current  INR goal:  2.0-3.0   TTR:  82.6 % (1 y)   Target end date:  Indefinite   Send INR reminders to:  LARISA RICHARDS    Indications    Persistent Atrial Fibrillation [I48.91]  Persistent atrial fibrillation (H) [I48.19]           Comments:  Home monitor ( [employer] )managed by exception         Anticoagulation Care Providers     Provider Role Specialty Phone number    Erica Hansen APRN CNP Referring Cardiovascular Disease [phone]    Everett Renee MD  Cardiovascular Disease [phone]

## 2022-08-30 ENCOUNTER — ANTICOAGULATION THERAPY VISIT (OUTPATIENT)
Dept: ANTICOAGULATION | Facility: CLINIC | Age: 77
End: 2022-08-30

## 2022-08-30 DIAGNOSIS — I48.91 ATRIAL FIBRILLATION (H): Primary | ICD-10-CM

## 2022-08-30 DIAGNOSIS — I48.19 PERSISTENT ATRIAL FIBRILLATION (H): ICD-10-CM

## 2022-08-30 LAB — INR HOME MONITORING: 2.2 (ref 2–3)

## 2022-08-30 NOTE — PROGRESS NOTES
ANTICOAGULATION MANAGEMENT     Buck Sinclair 77 year old male is on warfarin with therapeutic INR result. (Goal INR 2.0-3.0)    Recent labs: (last 7 days)     08/30/22  0000   INR 2.2       ASSESSMENT     Source(s): Chart Review  Previous INR was Supratherapeutic  Medication, diet, health changes since last INR chart reviewed; none identified           PLAN     Recommended plan for no diet, medication or health factor changes affecting INR     Dosing Instructions: Continue your current warfarin dose with next INR in 1 week       Summary  As of 8/30/2022    Full warfarin instructions:  5 mg every Mon, Thu; 2.5 mg all other days   Next INR check:  9/6/2022             Detailed voice message left for Buck with dosing instructions and follow up date.     Patient to recheck with home meter    Education provided: Please call back if any changes to your diet, medications or how you've been taking warfarin    Plan made per [facility] anticoagulation protocol    Michael Holliday RN  Anticoagulation Clinic  8/30/2022    _______________________________________________________________________     Anticoagulation Episode Summary     Current INR goal:  2.0-3.0   TTR:  81.6 % (1 y)   Target end date:  Indefinite   Send INR reminders to:  LARISA RICHARDS    Indications    Persistent Atrial Fibrillation [I48.91]  Persistent atrial fibrillation (H) [I48.19]           Comments:  Home monitor ( [employer] )managed by [facility]         Anticoagulation Care Providers     Provider Role Specialty Phone number    Erica Hansen APRN CNP Referring Cardiovascular Disease [phone]    Everett Renee MD  Cardiovascular Disease [phone]

## 2022-09-06 ENCOUNTER — DOCUMENTATION ONLY (OUTPATIENT)
Dept: ANTICOAGULATION | Facility: CLINIC | Age: 77
End: 2022-09-06

## 2022-09-06 DIAGNOSIS — I48.19 PERSISTENT ATRIAL FIBRILLATION (H): ICD-10-CM

## 2022-09-06 DIAGNOSIS — I48.91 ATRIAL FIBRILLATION (H): Primary | ICD-10-CM

## 2022-09-06 LAB — INR HOME MONITORING: 2.7 (ref 2–3)

## 2022-09-06 NOTE — PROGRESS NOTES
ANTICOAGULATION  MANAGEMENT-Home Monitor Managed by Exception    Buck JONES Sinclair 77 year old male is on warfarin with therapeutic INR result. (Goal INR 2.0-3.0)    Recent labs: (last 7 days)     09/06/22  0000   INR 2.7       Previous INR was Therapeutic  Medication, diet, health changes since last INR:chart reviewed; none identified  Contacted within the last 12 weeks by phone on 8/30/2022      LUCAS Jane was NOT contacted regarding therapeutic result today per home monitoring policy manage by exception agreement.   Current warfarin dose is to be continued:     Summary  As of 9/6/2022    Full warfarin instructions:  5 mg every Mon, Thu; 2.5 mg all other days   Next INR check:  9/13/2022               Gisele Prince RN  Anticoagulation Clinic  9/6/2022    _______________________________________________________________________     Anticoagulation Episode Summary     Current INR goal:  2.0-3.0   TTR:  81.6 % (1 y)   Target end date:  Indefinite   Send INR reminders to:  LARISA RICHARDS    Indications    Persistent Atrial Fibrillation [I48.91]  Persistent atrial fibrillation (H) [I48.19]           Comments:  Home monitor ( [employer] )managed by exception         Anticoagulation Care Providers     Provider Role Specialty Phone number    Erica Hansen APRN CNP Referring Cardiovascular Disease [phone]    Everett Renee MD  Cardiovascular Disease [phone]

## 2022-09-09 ENCOUNTER — ANCILLARY PROCEDURE (OUTPATIENT)
Dept: CARDIOLOGY | Facility: CLINIC | Age: 77
End: 2022-09-09
Attending: INTERNAL MEDICINE
Payer: COMMERCIAL

## 2022-09-09 DIAGNOSIS — I42.9 CARDIOMYOPATHY (H): Primary | ICD-10-CM

## 2022-09-09 DIAGNOSIS — Z95.810 ICD (IMPLANTABLE CARDIOVERTER-DEFIBRILLATOR) IN PLACE: ICD-10-CM

## 2022-09-12 PROCEDURE — 93295 DEV INTERROG REMOTE 1/2/MLT: CPT | Performed by: INTERNAL MEDICINE

## 2022-09-12 PROCEDURE — 93296 REM INTERROG EVL PM/IDS: CPT | Performed by: INTERNAL MEDICINE

## 2022-09-13 ENCOUNTER — RESEARCH ENCOUNTER (OUTPATIENT)
Dept: CARDIOLOGY | Facility: CLINIC | Age: 77
End: 2022-09-13

## 2022-09-13 ENCOUNTER — DOCUMENTATION ONLY (OUTPATIENT)
Dept: ANTICOAGULATION | Facility: CLINIC | Age: 77
End: 2022-09-13

## 2022-09-13 ENCOUNTER — HOSPITAL ENCOUNTER (OUTPATIENT)
Dept: CARDIOLOGY | Facility: CLINIC | Age: 77
Discharge: HOME | End: 2022-09-13
Attending: INTERNAL MEDICINE | Admitting: INTERNAL MEDICINE
Payer: COMMERCIAL

## 2022-09-13 DIAGNOSIS — R06.09 DOE (DYSPNEA ON EXERTION): ICD-10-CM

## 2022-09-13 DIAGNOSIS — I42.9 CARDIOMYOPATHY (H): ICD-10-CM

## 2022-09-13 DIAGNOSIS — Z00.6 EXAMINATION OF PARTICIPANT OR CONTROL IN CLINICAL RESEARCH: Primary | ICD-10-CM

## 2022-09-13 DIAGNOSIS — I48.19 PERSISTENT ATRIAL FIBRILLATION (H): Primary | ICD-10-CM

## 2022-09-13 LAB
INR HOME MONITORING: 2.1 (ref 2–3)
LVEF ECHO: NORMAL

## 2022-09-13 PROCEDURE — 93306 TTE W/DOPPLER COMPLETE: CPT | Mod: 26 | Performed by: INTERNAL MEDICINE

## 2022-09-13 PROCEDURE — 93306 TTE W/DOPPLER COMPLETE: CPT

## 2022-09-13 NOTE — PROGRESS NOTES
"MEDICATION INSTRUCTIONS  DATE DOSING INSTRUCTIONS   9/13/2022 Start 3 breaths, four times a day. Increase by 1 breath every 3 days (4x daily) according to titration calendar until you reach a goal of 12 breaths.     Inhalation sessions should be during waking hours and about 4 hours apart.          9/13/2022 Your weight today was (279 lbs) up 4 lbs since last visit (275).  Please take an extra 40 mg lasix daily in the am until your weight is down by 4 lbs.  Dr. Day's nurse will call to schedule labs locally to check your kidney function/elctrolytes in 1 week.                      BREATHING TECHNIQUES FOR TYVASO  1. Ensure proper breathing technique by:    Sealing your lips completely around the mouthpiece.    Maintaining normal breathing patterns.    Inhaling approximately 2-3 seconds and breathing \"normal full breaths.\"    Do not hold breath once medication is inhaled.  2. Use the pause button as needed if coughing develops or if you need to pause treatment after starting a session.   3. Keep the inhalation device leveled when doing treatment sessions.     GENERAL INFORMATION  1. Use 1 ampule of inhaled Tyvaso daily.  2. Keep ampules in the foil pouch. The Tyvaso medication is sensitive to light.   3. Use opened foil packs within 7 days.   4. Do not reuse medicine cup and filter membrane. Replace parts daily.   5. Do not use or place the device near microwaves, ovens, flammable liquids/materials, heated surfaces, or other strong electric of magnetic fields (eg. MRI machine).  6. Charge the battery pack nightly. A battery may take up to 40 hours to fully charge.    A fully charged batter typically last up to about 200 inhalations.    A battery charged for one night last about 10 hours (or about 40 inhalations).  7. Wash the plastic parts daily with mild, soapy warm water, rinse thoroughly, and let air dry. Use a clean cloth to wipe the interior of the inhalation device chamber weekly.     OTHER " INSTRUCTIONS  1. Do NOT discard any empty ampules. Bring ALL ampules (empty or full) back to each visit.   2. If medication spills on your hand, make sure the wash your hands immediately to avoid skin irritation.   3. Contact your  if you notice any of the following side effects: Dizziness, abnormal bleeding, coughing, headache, throat irritation/pain, and/or nausea.  4. If you notice an allergic reaction, please call us. If an allergic reaction is so severe your throat is closing up and cannot breath, call 911.   5. If anyone prescribes a new medication, call us before starting this new medication to ensure that it is safe to take with the study drug.     CONTACT INFORMATION  Primary Coordinator: Jacki Stock RN, CCRC (ph) [phone]

## 2022-09-13 NOTE — PROGRESS NOTES
Title of Research Study: An Open-Label Extension study of Inhaled Treprostinil in Patients with Pulmonary Hypertension due to Chronic Obstructive Pulmonary Disease (PH-COPD)    IRB#[id number]    PI: Barb Day MD (p) [phone]  : Jacki Stock RN (p) [phone]     Estimated duration of study: 3 years    Subject was approached for participation in the above study prior to the end of the randomized trial. The current IRB approved consent form was reviewed and discussed at length with the subject and wife.  This included purpose, research hypothesis, nature of the research, risks & benefits, and procedures required for screening, enrollment, and the required follow up. Discussed confidentiality issues, compensation for injury, and alternative treatments/procedures available. Subject was informed that participation is voluntary and that refusal to participate will involve no penalty or decrease benefits to which the subject is otherwise entitled. Optional Biomarker sub study was discussed and offered. Patient and wife have had the opportunity to read the entire written consent, ask questions and concerns of the study investigator and offered sufficient time to consider the research trial.  Patient was able to clearly state what study participation involved and the associated requirements.  All questions and concerns were addressed. Patient voluntarily signed the consent/HIPAA form on 9/13/2022 at 09:20 prior to any research required tests / procedures and was given a copy of the signed form.    Consented to Optional Biomarker Testing:  No

## 2022-09-13 NOTE — PROGRESS NOTES
ANTICOAGULATION  MANAGEMENT-Home Monitor Managed by Exception    Ubck JONES Sinclair 77 year old male is on warfarin with therapeutic INR result. (Goal INR 2.0-3.0)    Recent labs: (last 7 days)     09/13/22  0000   INR 2.1       Previous INR was Therapeutic  Medication, diet, health changes since last INR:chart reviewed; none identified  Contacted within the last 12 weeks by phone on 8/30/22      LUCAS     Buck was NOT contacted regarding therapeutic result today per home monitoring policy manage by exception agreement.   Current warfarin dose is to be continued:     Summary  As of 9/13/2022    Full warfarin instructions:  5 mg every Mon, Thu; 2.5 mg all other days   Next INR check:  9/20/2022               Leana Nayak RN  Anticoagulation Clinic  9/13/2022    _______________________________________________________________________     Anticoagulation Episode Summary     Current INR goal:  2.0-3.0   TTR:  81.6 % (1 y)   Target end date:  Indefinite   Send INR reminders to:  LARISA RICHARDS    Indications    Persistent Atrial Fibrillation [I48.91]  Persistent atrial fibrillation (H) [I48.19]           Comments:  Home monitor ( [employer] )managed by exception         Anticoagulation Care Providers     Provider Role Specialty Phone number    Erica Hansen APRN CNP Referring Cardiovascular Disease [phone]    Everett Renee MD  Cardiovascular Disease [phone]

## 2022-09-14 DIAGNOSIS — Z00.6 EXAMINATION OF PARTICIPANT OR CONTROL IN CLINICAL RESEARCH: Primary | ICD-10-CM

## 2022-09-15 NOTE — PRE-PROCEDURE
GENERAL PRE-PROCEDURE:   Procedure:  Coronary angiogram with possible PCI, right and left heart cathterization  Date/Time:  1/24/2022 8:40 AM    Written consent obtained?: Yes    Risks and benefits: Risks, benefits and alternatives were discussed    Consent given by:  Patient  Patient states understanding of procedure being performed: Yes    Patient's understanding of procedure matches consent: Yes    Procedure consent matches procedure scheduled: Yes    Expected level of sedation:  Moderate  Appropriately NPO:  Yes  ASA Class:  3 (CAD, HFpEF; NYHA Class III, Per AF; s/p cryo PVI, s/p DCCV, HTN, dyslipidemia, mild mitral regurgitation, mild RV dilation, moderate pHTN, COPD, Class I obesity; BMI 31.95kg/m2)  Mallampati  :  Grade 1- soft palate, uvula, tonsillar pillars, and posterior pharyngeal wall visible  Lungs:  Lungs clear with good breath sounds bilaterally  Heart:  Systolic murmur (II/VI )  History & Physical reviewed:  History and physical reviewed and updates made (see comment)  H&P Comments:  II/VI Systolic murmur is appreciated at the apex  Statement of review:  I have reviewed the lab findings, diagnostic data, medications, and the plan for sedation     16

## 2022-09-16 ENCOUNTER — TELEPHONE (OUTPATIENT)
Dept: CARDIOLOGY | Facility: CLINIC | Age: 77
End: 2022-09-16

## 2022-09-16 NOTE — TELEPHONE ENCOUNTER
Short courtesy check as patient was very worried when he was told he was in Afib when he had some test or procedure done recently.  Had him send remote and reviewed short Afib episodes with him.  Longest episode was ~7 minutes. Pt is on Warfarin. He was very thankful and appreciated knowing that he was out of Afib. Katie Gresham, Device RN

## 2022-09-17 ENCOUNTER — HEALTH MAINTENANCE LETTER (OUTPATIENT)
Age: 77
End: 2022-09-17

## 2022-09-21 DIAGNOSIS — R06.09 DOE (DYSPNEA ON EXERTION): Primary | ICD-10-CM

## 2022-09-21 DIAGNOSIS — I27.20 PULMONARY HYPERTENSION (H): ICD-10-CM

## 2022-09-27 ENCOUNTER — DOCUMENTATION ONLY (OUTPATIENT)
Dept: ANTICOAGULATION | Facility: CLINIC | Age: 77
End: 2022-09-27

## 2022-09-27 ENCOUNTER — TELEPHONE (OUTPATIENT)
Dept: CARDIOLOGY | Facility: CLINIC | Age: 77
End: 2022-09-27

## 2022-09-27 DIAGNOSIS — I48.19 PERSISTENT ATRIAL FIBRILLATION (H): ICD-10-CM

## 2022-09-27 DIAGNOSIS — I48.91 ATRIAL FIBRILLATION (H): Primary | ICD-10-CM

## 2022-09-27 LAB — INR HOME MONITORING: 2.1 (ref 2–3)

## 2022-09-27 NOTE — PROGRESS NOTES
ANTICOAGULATION  MANAGEMENT-Home Monitor Managed by Exception    Buck JONES Sinclair 77 year old male is on warfarin with therapeutic INR result. (Goal INR 2.0-3.0)    Recent labs: (last 7 days)     09/27/22  0000   INR 2.1       Previous INR was Therapeutic  Medication, diet, health changes since last INR:chart reviewed; none identified  Contacted within the last 12 weeks by phone on 8/30/22      LUCAS     Buck was NOT contacted regarding therapeutic result today per home monitoring policy manage by exception agreement.   Current warfarin dose is to be continued:     Summary  As of 9/27/2022    Full warfarin instructions:  5 mg every Mon, Thu; 2.5 mg all other days   Next INR check:  10/11/2022               Fay Kuo RN  Anticoagulation Clinic  9/27/2022    _______________________________________________________________________     Anticoagulation Episode Summary     Current INR goal:  2.0-3.0   TTR:  81.6 % (1 y)   Target end date:  Indefinite   Send INR reminders to:  LARISA [facility]IT[facility]    Indications    Persistent Atrial Fibrillation [I48.91]  Persistent atrial fibrillation (H) [I48.19]           Comments:  Home monitor ( [employer] )managed by exception         Anticoagulation Care Providers     Provider Role Specialty Phone number    Erica Hansen APRN CNP Referring Cardiovascular Disease [phone]    Everett Renee MD  Cardiovascular Disease [phone]

## 2022-09-27 NOTE — TELEPHONE ENCOUNTER
Spoke with pt and he stated that his weight is back down to base line.  He stated that at this time he was not able to give me a number.  He stated he will get labs drawn at his next clinic appointment.  Anabell Lemus RN on 9/27/2022 at 5:08 PM

## 2022-10-03 ENCOUNTER — TELEPHONE (OUTPATIENT)
Dept: CARDIOLOGY | Facility: CLINIC | Age: 77
End: 2022-10-03

## 2022-10-03 NOTE — TELEPHONE ENCOUNTER
Encounter Type: Courtesy remote ICD transmission to check for HANNAH.  Device: Medtronic Evera ICd.  Presenting: AP-VS 80 bpm.  Findings: Device reached HANNAH on 9/29/22.   Plan: Routed to device RN to begin change out process. LINDA Lin Device Specialist    Patient called device clinic to report that his device was beeping.  States he was away from his monitor over the weekend so no alerts were received. Requested he send a manual transmission for review. Today's device transmission shows he went to HANNAH on 9/29/2022.  Reviewed next steps to change out and that alert tones would continue to sound and can be disregarded for now. Patient verbalized understanding. Does have upcoming f/up appt with Dr. Lizama this week, will see if this appt is still needed.    Jaclyn Zaman RN      HANNAH checklist:     Heart Care Device Change-Out Checklist (HANNAH Checklist)    Device Data  ICD and Dual    :  Medtronic   Model:  [id number] Evera XT    Serial Number:  [id number]  Implant location: Right Chest     Implant Date: 6/11/2014  HANNAH Date:  9/29/2022  Device Diagnosis:  Cardiomyopathy; IHSS/HOCM    Device Alert(s):  Yes,--Potential for Shortened RRT-to-EOS in Subset of ICDs and CRT-Ds. Device did not have any signs of unexpected/early battery replacement. Per Medtronic look up website: devices affected are those that showed signs of early battery depletion, this device went to HANNAH in normal fashion.     Lead Data  (Print Device History and attach to this document. Enter each lead  and model number.)          Last IN-clinic Interrogation Date: 2/18/22      Atrium: Medtronic 5076 CapSureFix Novus   Lead Imp:  361Ohms, Pacing Threshold:  1.25V @ 0.4 ms  and Sensing Threshold:  0.9mV      Right Ventricle: Medtronic 6935M Sprint Quattro Secure S   Lead Imp:  418Ohms, Pacing Threshold:  0.75V @ 0.4 ms  and Sensing Threshold:  11.8 mV   Shock Imp: 85 Ohms     Old Leads Present/Abandoned: Yes-per [employer] has Abandoned RA  lead.    Lead issues/concerns:  No    Lead Alert(s):  No    Diagnostic Information  Intrinsic Rhythm:  SR 50's w/ 1st Deg Heart block    Atrial Fibrillation:  Paroxysmal Atrial-Fib  Takes Anticoagulant? Yes  Description:  Warfarin    Pacing Percentages  Atrial Pacing 92% and Ventricle Pacing 1.8%  Pacemaker Dependent? No    History of VT/VF therapy    ATP: No  Appropriate Shocks:  N/A     Ejection Fraction  Last EF Date:  9/13/2022    By Echocardiogram  Last EF Measurement:  55-60%      Special Instructions/Timeframe for change-out: Device is under advisory however did not show signs of early battery depletion. Would recommend change out next available as precautionary plan    Routed to EP:  Yes: EP MD Team    Device RN: Jaclyn Zaman RN

## 2022-10-04 ENCOUNTER — DOCUMENTATION ONLY (OUTPATIENT)
Dept: CARDIOLOGY | Facility: CLINIC | Age: 77
End: 2022-10-04

## 2022-10-04 ENCOUNTER — DOCUMENTATION ONLY (OUTPATIENT)
Dept: ANTICOAGULATION | Facility: CLINIC | Age: 77
End: 2022-10-04

## 2022-10-04 DIAGNOSIS — I48.19 PERSISTENT ATRIAL FIBRILLATION (H): ICD-10-CM

## 2022-10-04 DIAGNOSIS — I42.9 CARDIOMYOPATHY (H): Primary | ICD-10-CM

## 2022-10-04 DIAGNOSIS — I48.91 ATRIAL FIBRILLATION (H): Primary | ICD-10-CM

## 2022-10-04 LAB — INR HOME MONITORING: 2.6 (ref 2–3)

## 2022-10-04 NOTE — PROGRESS NOTES
ANTICOAGULATION  MANAGEMENT-Home Monitor Managed by Exception    Buck JONES Sinclair 77 year old male is on warfarin with therapeutic INR result. (Goal INR 2.0-3.0)    Recent labs: (last 7 days)     10/04/22  0000   INR 2.6       Previous INR was Therapeutic  Medication, diet, health changes since last INR:chart reviewed; none identified  Contacted within the last 12 weeks by phone on 8/30/22      LUCAS     Buck was NOT contacted regarding therapeutic result today per home monitoring policy manage by exception agreement.   Current warfarin dose is to be continued:     Summary  As of 10/4/2022    Full warfarin instructions:  5 mg every Mon, Thu; 2.5 mg all other days   Next INR check:  10/18/2022               Gisele Saul RN  Anticoagulation Clinic  10/4/2022    _______________________________________________________________________     Anticoagulation Episode Summary     Current INR goal:  2.0-3.0   TTR:  81.6 % (1 y)   Target end date:  Indefinite   Send INR reminders to:  [facility]    Indications    Persistent Atrial Fibrillation [I48.91]  Persistent atrial fibrillation (H) [I48.19]           Comments:  Home monitor ( [employer] )managed by exception         Anticoagulation Care Providers     Provider Role Specialty Phone number    Erica Hansen APRN CNP Referring Cardiovascular Disease [phone]    Everett Renee MD  Cardiovascular Disease [phone]

## 2022-10-04 NOTE — TELEPHONE ENCOUNTER
Elo Collins MD Westlund, Jessica, RN  Caller: Unspecified (Yesterday,  1:58 PM)  Continue Warfrain   INR on the day of the procedure along with CBC and BMP

## 2022-10-04 NOTE — TELEPHONE ENCOUNTER
EP MD/EP NC HANNAH Review  Dr Collins reviewed HANNAH checklist  Reviewed by Val Cabezas, RN 10/4/2022 2:56 PM    Order for procedure placed in EPIC and  [place]

## 2022-10-04 NOTE — TELEPHONE ENCOUNTER
----- Message -----   From: Elo Collins MD   Sent: 10/4/2022   8:28 AM CDT   To: Jaclyn Zaman RN, Deejay Lizama MD     Should be okay to cancel it if the patient is okay with proceeding with the generator change out and does not have any major questions. If he strongly prefers a prior clinic visit then we can schedule it.     ----- Message -----   From: Jaclyn Zaman RN   Sent: 10/4/2022   8:21 AM CDT   To: Elo Collins MD, Deejay Lizama MD     Ok sounds good!... should I cancel the upcoming OV appt with Dr. Lizama then on 10/7?   ----- Message -----   From: Elo Collins MD   Sent: 10/4/2022   8:08 AM CDT   To: Jaclyn Zaman RN, Jillian Prince MD, *     Discussed with Dr Lizama, okay to schedule with any EP at earliest availability.   Thanks!

## 2022-10-04 NOTE — PROGRESS NOTES
H&P  PMD []  Received [] OV: []  Date: Teach  []   Orders  I [x] P  [x]  Letter []   COVID  FV/EPIC []  Date:    Home []  External  []  Date: AC:  Warfarin  None []  Continue  [x]   Hold:  AM Meds: Take all []   Hold:  lasix     1945  Home:[phone] (home) Cell:[phone] (mobile)  Emergency Contact: FELIPE BROWN [phone]  PCP: Vivek Guerrero, [phone]    Component      Latest Ref Rng & Units 8/30/2022 9/6/2022 9/13/2022 9/27/2022   INR HOME MONITORING      2.000 - 3.000 2.2 2.7 2.1 2.1     Component      Latest Ref Rng & Units 10/4/2022   INR HOME MONITORING      2.000 - 3.000 2.6             Important patient information for CSC/Cath Lab staff : right sided implant    [facility] EP Cath Lab Procedure Order     Device Implant/Revision:  Procedure: Generator Change Out  Current Device/Device Co Needed for Procedure: Dual ICDMedtronic  Ordering Provider: Dr Collins  Ordering Date: 10/4/2022  Diagnosis:  Generator Change- Device at HANNAH  Scheduling Timeframe:  Next Available  Scheduling Restrictions: None  Scheduling Contact: Please contact pt to schedule, if you are unable to schedule date within the next 24 hours please contact pt to update on scheduling process  Scheduling Follow-up apt for NEW HIS/CRT Implants: NA  Pre-Procedural Testing needed: Home COVID testing  Anesthesia:  Conscious Sedation- CV RN to administer    [facility] EP Cath Lab Prep   H&P:  Pt to schedule with PMD to complete  Pre-op Labs: CBC, BMP, Beta HcG if appropriate, and INR if on Warfarin will be ordered AM of procedure and Review of most recent labs, WEL for procedure  T&S Pre-Procedure Review: NA- T&S is not needed for procedure  Medical Records Pertinent for Procedure:  Echo 9/13/22 and Device Implant Record 6/11/14    Patient Education:  Teach with Patient: Will be completed via phone prior to procedure, and letter was also sent to pt via mail/SkemA with written pre-procedural instructions.  Risks Reviewed:      Internal  Cardiac Defibrillator     <1% for each of the following: infection, bleeding, hematoma,   pneumothorax, subclavian vein thrombosis, cardiac perforation, cardiac tamponade, arrhythmias, pectoral or diaphragmatic stimulation, air embolus, pocket erosion.    <4 % lead dislodgment; <1% lead fracture or generator malfunction.    <0.5% CVA or MI.     <0.1% death.    If external defibrillation is needed, 25% risk for superficial burn.    <5% risk of ICD system revision.    >95% VT/VF success implant rate.    Risks associated with general anesthesia will be addressed by the Anesthesiology Department.    For patients on anticoagulation, the risk of bleeding, hematoma, tamponade, and stroke with partial withdrawal are increased.    Patient education also completed on JET, spurious shocks, driving limitation, and psychological and social aspects of having an ICD.      Pre-Procedure Instruction:  NPO after midnight, Remove all jewelry prior to coming in for procedure, Shower prior to arrival, Notified patient of time and date of procedure by CV , Transportation arrangements needed s/p procedure, Post-procedure follow up process, Sedation plan/orders and Pre-procedure letter was sent to pt by CV     Pre-Procedure Medication Instructions:  Instructions given to pt regarding anticoagulants: Coumadin- instructed to continue anticoagulation uninterrupted through their procedure  Instructions given to pt regarding antiarrhythmic medication: N/A for this type of procedure; N/A  Instructions for medication, other than anticoagulants/antiarrhythmics listed above, given to pt: to hold lasix the morning of procedure, and to take remaining medications with small sips of water  Allergies: Reviewed allergies, no concerns regarding orders for procedure    Allergies   Allergen Reactions     Adhesive Tape Other (See Comments)     ADHESIVE TAPE; SKIN IRRITATION; Skin pulled off with foam tape       Amiodarone      ADVERSE  "REACTION.  Sunlight sensitivity.     Lisinopril        Current Outpatient Medications:      acetaminophen (TYLENOL) 325 MG tablet, Take 650 mg by mouth 2 times daily, Disp: , Rfl:      albuterol (PROAIR HFA/PROVENTIL HFA/VENTOLIN HFA) 108 (90 Base) MCG/ACT inhaler, Inhale 2 puffs into the lungs every 4 hours as needed for shortness of breath / dyspnea or wheezing, Disp: , Rfl:      Ascorbic Acid (VITAMIN C) 500 MG CAPS, Take 1,000 mg by mouth daily, Disp: , Rfl:      atorvastatin (LIPITOR) 40 MG tablet, Take 40 mg by mouth daily, Disp: , Rfl:      budesonide-formoterol (SYMBICORT) 160-4.5 MCG/ACT Inhaler, Inhale 2 puffs into the lungs 2 times daily, Disp: , Rfl:      co-enzyme Q-10 100 MG CAPS capsule, Take 2 capsules (200 mg) by mouth daily, Disp: 2 capsule, Rfl:      fish oil-omega-3 fatty acids 1000 MG capsule, Take 1 g by mouth 2 times daily, Disp: , Rfl:      FLUoxetine (PROZAC) 20 MG capsule, TAKE 1 CAPSULE BY MOUTH EVERY DAY, Disp: , Rfl:      furosemide (LASIX) 80 MG tablet, Take 1 tablet (80 mg) by mouth 2 times daily, Disp: 90 tablet, Rfl: 6     losartan (COZAAR) 50 MG tablet, Take 1 tablet (50 mg) by mouth daily, Disp: 90 tablet, Rfl: 3     MAG64 64 MG TBEC CR tablet, TAKE 2 TABLETS BY MOUTH EVERY DAY, Disp: 180 tablet, Rfl: 1     multivitamin, therapeutic (THERA-VIT) TABS tablet, Take 1 tablet by mouth daily, Disp: , Rfl:      nitroGLYcerin (NITROSTAT) 0.4 MG sublingual tablet, One tablet under the tongue every 5 minutes if needed for chest pain. May repeat every 5 minutes for a maximum of 3 doses in 15 minutes\", Disp: 25 tablet, Rfl: 3     OXYGEN-HELIUM IN, 4-5 L , Disp: , Rfl:      polyethylene glycol (MIRALAX) 17 GM/Dose powder, Take 17 g by mouth daily , Disp: , Rfl:      sotalol (BETAPACE) 160 MG tablet, TAKE 1 TABLET(160 MG) BY MOUTH TWICE DAILY, Disp: 180 tablet, Rfl: 1     study - treprostinil, IDS# [id number], 0.6 MG/ML neb solution, Start at 3 breaths 4 times daily then increase by one breath every 3 " days (QID) as directed by study doctor, Disp: , Rfl:      tiotropium (SPIRIVA) 18 MCG inhaled capsule, Inhale 18 mcg into the lungs daily, Disp: , Rfl:      vitamin D2 (ERGOCALCIFEROL) 63017 units (1250 mcg) capsule, TAKE 1 CAPSULE BY MOUTH 2 TIMES A WEEK, Disp: , Rfl:      warfarin ANTICOAGULANT (COUMADIN) 2.5 MG tablet, As of 6/23: 5 mg every Mon, Thu; 2.5 mg all other days, Disp: 116 tablet, Rfl: 1    Documentation Date:10/4/2022 2:51 PM  Val Cabezas RN

## 2022-10-05 ENCOUNTER — LAB (OUTPATIENT)
Dept: CARDIOLOGY | Facility: CLINIC | Age: 77
End: 2022-10-05
Payer: COMMERCIAL

## 2022-10-05 ENCOUNTER — TRANSFERRED RECORDS (OUTPATIENT)
Dept: HEALTH INFORMATION MANAGEMENT | Facility: CLINIC | Age: 77
End: 2022-10-05

## 2022-10-05 DIAGNOSIS — R06.09 DOE (DYSPNEA ON EXERTION): ICD-10-CM

## 2022-10-05 DIAGNOSIS — I27.20 PULMONARY HYPERTENSION (H): ICD-10-CM

## 2022-10-05 DIAGNOSIS — Z00.6 EXAMINATION OF PARTICIPANT OR CONTROL IN CLINICAL RESEARCH: ICD-10-CM

## 2022-10-05 LAB
ANION GAP SERPL CALCULATED.3IONS-SCNC: 13 MMOL/L (ref 7–15)
BUN SERPL-MCNC: 40.4 MG/DL (ref 8–23)
CALCIUM SERPL-MCNC: 9.3 MG/DL (ref 8.8–10.2)
CHLORIDE SERPL-SCNC: 105 MMOL/L (ref 98–107)
CREAT SERPL-MCNC: 1.61 MG/DL (ref 0.67–1.17)
DEPRECATED HCO3 PLAS-SCNC: 26 MMOL/L (ref 22–29)
GFR SERPL CREATININE-BSD FRML MDRD: 44 ML/MIN/1.73M2
GLUCOSE SERPL-MCNC: 123 MG/DL (ref 70–99)
NT-PROBNP SERPL-MCNC: 3598 PG/ML (ref 0–450)
POTASSIUM SERPL-SCNC: 4.4 MMOL/L (ref 3.4–5.3)
SODIUM SERPL-SCNC: 144 MMOL/L (ref 136–145)

## 2022-10-05 PROCEDURE — 83880 ASSAY OF NATRIURETIC PEPTIDE: CPT

## 2022-10-05 PROCEDURE — 80048 BASIC METABOLIC PNL TOTAL CA: CPT

## 2022-10-05 PROCEDURE — 36415 COLL VENOUS BLD VENIPUNCTURE: CPT

## 2022-10-06 ENCOUNTER — LAB REQUISITION (OUTPATIENT)
Dept: LAB | Facility: CLINIC | Age: 77
End: 2022-10-06
Payer: COMMERCIAL

## 2022-10-06 ENCOUNTER — TRANSFERRED RECORDS (OUTPATIENT)
Dept: HEALTH INFORMATION MANAGEMENT | Facility: CLINIC | Age: 77
End: 2022-10-06

## 2022-10-06 DIAGNOSIS — I10 ESSENTIAL (PRIMARY) HYPERTENSION: ICD-10-CM

## 2022-10-06 DIAGNOSIS — E78.2 MIXED HYPERLIPIDEMIA: ICD-10-CM

## 2022-10-06 LAB
ANION GAP SERPL CALCULATED.3IONS-SCNC: 13 MMOL/L (ref 7–15)
BUN SERPL-MCNC: 40.4 MG/DL (ref 8–23)
CALCIUM SERPL-MCNC: 9.2 MG/DL (ref 8.8–10.2)
CHLORIDE SERPL-SCNC: 104 MMOL/L (ref 98–107)
CHOLEST SERPL-MCNC: 109 MG/DL
CREAT SERPL-MCNC: 1.54 MG/DL (ref 0.67–1.17)
DEPRECATED HCO3 PLAS-SCNC: 24 MMOL/L (ref 22–29)
GFR SERPL CREATININE-BSD FRML MDRD: 46 ML/MIN/1.73M2
GLUCOSE SERPL-MCNC: 107 MG/DL (ref 70–99)
HDLC SERPL-MCNC: 58 MG/DL
LDLC SERPL CALC-MCNC: 43 MG/DL
NONHDLC SERPL-MCNC: 51 MG/DL
POTASSIUM SERPL-SCNC: 4.4 MMOL/L (ref 3.4–5.3)
SODIUM SERPL-SCNC: 141 MMOL/L (ref 136–145)
TRIGL SERPL-MCNC: 39 MG/DL

## 2022-10-06 PROCEDURE — 80048 BASIC METABOLIC PNL TOTAL CA: CPT | Mod: ORL | Performed by: FAMILY MEDICINE

## 2022-10-06 PROCEDURE — 80061 LIPID PANEL: CPT | Mod: ORL | Performed by: FAMILY MEDICINE

## 2022-10-11 ENCOUNTER — TRANSFERRED RECORDS (OUTPATIENT)
Dept: HEALTH INFORMATION MANAGEMENT | Facility: CLINIC | Age: 77
End: 2022-10-11

## 2022-10-11 ENCOUNTER — DOCUMENTATION ONLY (OUTPATIENT)
Dept: ANTICOAGULATION | Facility: CLINIC | Age: 77
End: 2022-10-11

## 2022-10-11 DIAGNOSIS — I48.19 PERSISTENT ATRIAL FIBRILLATION (H): Primary | ICD-10-CM

## 2022-10-11 LAB — INR HOME MONITORING: 2.1 (ref 2–3)

## 2022-10-11 NOTE — PROGRESS NOTES
ANTICOAGULATION  MANAGEMENT-Home Monitor Managed by Exception    Buck Sinclair 77 year old male is on warfarin with therapeutic INR result. (Goal INR 2.0-3.0)    Recent labs: (last 7 days)     10/11/22  0000   INR 2.1       Previous INR was Therapeutic  Medication, diet, health changes since last INR:chart reviewed; none identified  Contacted within the last 12 weeks by phone on 8/30/22    Upcoming procedure: 10/28/22 Implantable Cardioverter Defibrillator Generator Replacement Dual. Per cardiology note dated 10/4: Coumadin- instructed to continue anticoagulation uninterrupted through their procedure      PLAN     Buck was NOT contacted regarding therapeutic result today per home monitoring policy manage by exception agreement.   Current warfarin dose is to be continued:           Leana Nayak RN  Anticoagulation Clinic  10/11/2022    _______________________________________________________________________     Anticoagulation Episode Summary     Current INR goal:  2.0-3.0   TTR:  81.6 % (1 y)   Target end date:  Indefinite   Send INR reminders to:  [facility]    Indications    Persistent Atrial Fibrillation [I48.91]  Persistent atrial fibrillation (H) [I48.19]           Comments:  Home monitor ( [employer] )managed by exception         Anticoagulation Care Providers     Provider Role Specialty Phone number    Erica Hansen APRN CNP Referring Cardiovascular Disease [phone]    Everett Renee MD  Cardiovascular Disease [phone]

## 2022-10-13 ENCOUNTER — TELEPHONE (OUTPATIENT)
Dept: ANTICOAGULATION | Facility: CLINIC | Age: 77
End: 2022-10-13

## 2022-10-13 NOTE — TELEPHONE ENCOUNTER
Patient has a procedure scheduled for 10/28/22 with provider GEORGE Collins.  Patient is having a cardiomyopathy, implantable cardioverter defibrillator generator replacement.    Routing to [facility] to assess for hold/bridge orders.    Desire Cortez RN

## 2022-10-18 ENCOUNTER — DOCUMENTATION ONLY (OUTPATIENT)
Dept: ANTICOAGULATION | Facility: CLINIC | Age: 77
End: 2022-10-18

## 2022-10-18 DIAGNOSIS — I48.19 PERSISTENT ATRIAL FIBRILLATION (H): Primary | Chronic | ICD-10-CM

## 2022-10-18 LAB — INR HOME MONITORING: 2.4 (ref 2–3)

## 2022-10-18 NOTE — TELEPHONE ENCOUNTER
Cardiology has advised remain in warfarin per 10/03/2022 TE.  No hold necessary per CHEST 2022 guidelines for ICD placement.  INR will be drawn on day of procedure.      Doreen Wick, PharmD BCACP  Anticoagulation Clinical Pharmacist

## 2022-10-18 NOTE — PROGRESS NOTES
ANTICOAGULATION  MANAGEMENT-Home Monitor Managed by Exception    Buck JONES Sinclair 77 year old male is on warfarin with therapeutic INR result. (Goal INR 2.0-3.0)    Recent labs: (last 7 days)     10/18/22  0000   INR 2.4       Previous INR was Therapeutic  Medication, diet, health changes since last INR:chart reviewed; none identified  Contacted within the last 12 weeks by phone on 10/4/22      LUCAS     Buck was NOT contacted regarding therapeutic result today per home monitoring policy manage by exception agreement.   Current warfarin dose is to be continued:     Summary  As of 10/18/2022    Full warfarin instructions:  5 mg every Mon, Thu; 2.5 mg all other days   Next INR check:  11/1/2022               Suzanna Corea RN  Anticoagulation Clinic  10/18/2022    _______________________________________________________________________     Anticoagulation Episode Summary     Current INR goal:  2.0-3.0   TTR:  81.6 % (1 y)   Target end date:  Indefinite   Send INR reminders to:  [facility]    Indications    Persistent Atrial Fibrillation [I48.91]  Persistent atrial fibrillation (H) [I48.19]           Comments:  Home monitor ( [employer] )managed by exception         Anticoagulation Care Providers     Provider Role Specialty Phone number    Erica Hansen APRN CNP Referring Cardiovascular Disease [phone]    Everett Renee MD  Cardiovascular Disease [phone]

## 2022-10-20 ENCOUNTER — TELEPHONE (OUTPATIENT)
Dept: CARDIOLOGY | Facility: CLINIC | Age: 77
End: 2022-10-20

## 2022-10-20 ENCOUNTER — DOCUMENTATION ONLY (OUTPATIENT)
Dept: ANTICOAGULATION | Facility: CLINIC | Age: 77
End: 2022-10-20

## 2022-10-20 DIAGNOSIS — I48.19 PERSISTENT ATRIAL FIBRILLATION (H): Primary | Chronic | ICD-10-CM

## 2022-10-20 DIAGNOSIS — I42.9 CARDIOMYOPATHY (H): Primary | ICD-10-CM

## 2022-10-20 NOTE — CONFIDENTIAL NOTE
Pre-Procedure Education Phone Call    Procedure: ICD Generator Change with with Dr Garcia 10-28-22    Education: Attempted to contact pt to review Pre-Intra-Post education and instructions, VM was left with request that pt return call    COIVD: pt has chosen to bring in photo/copy of at home rapid antigen test, 1-2 days prior to procedure    Pre-Op: Will need to verify that pt has pre-op scheduled with PMD, as apt/record of pre-op not listed in EPIC    Medications: Will review medication instructions with pt upon CB from pt, hold lasix am of.    Important patient information for staff: Home INR check, INR scheduled for 10-25-22, available in Epic    10/20/2022 2:23 PM  Sharlene Wick RN

## 2022-10-20 NOTE — PROGRESS NOTES
Date: 10/20/2022    Time of Call: 4:41 PM   [ VORB ] Ordering provider: Dr. Day  Order: Echocardiogram     Order received by: Kenzie Goel RN       Follow-up/additional notes: Orders entered. Echo prior to next research visit.     Kenzie Goel RN on 10/20/2022 at 4:41 PM

## 2022-10-20 NOTE — PROGRESS NOTES
ANTICOAGULATION CLINIC REFERRAL RENEWAL REQUEST       An annual renewal order is required for all patients referred to [facility].?  Please review and sign the pended referral order for Buck Sinclair.       ANTICOAGULATION SUMMARY      Warfarin indication(s)   Atrial Fibrillation    Mechanical heart valve present?  NO       Current goal range   INR: 2.0-3.0     Goal appropriate for indication? Goal INR 2-3, standard for indication(s) above     Time in Therapeutic Range (TTR)  (Goal > 60%) 81.6%       Office visit with referring provider's group within last year yes on 2/18/22       Desire Cortez RN  [facility]

## 2022-10-24 NOTE — TELEPHONE ENCOUNTER
Return call to patient.  Reviewed pre, intra and post instructions.  He states understanding.  Pt will perform home covid test 1-2 days prior.  He does home INR testing and will complete tomorrow.  H&P done with PMD at [facility] on 10-11-22, phone call to [facility] and copy is being faxed to our records team.  Pt has no questions at this time and reviewed contact information.

## 2022-10-25 ENCOUNTER — DOCUMENTATION ONLY (OUTPATIENT)
Dept: ANTICOAGULATION | Facility: CLINIC | Age: 77
End: 2022-10-25

## 2022-10-25 DIAGNOSIS — I48.19 PERSISTENT ATRIAL FIBRILLATION (H): Primary | ICD-10-CM

## 2022-10-25 LAB — INR HOME MONITORING: 2.6 (ref 2–3)

## 2022-10-25 NOTE — PROGRESS NOTES
ANTICOAGULATION  MANAGEMENT-Home Monitor Managed by Exception    Buck JONES Sinclair 77 year old male is on warfarin with therapeutic INR result. (Goal INR 2.0-3.0)    Recent labs: (last 7 days)     10/25/22  0000   INR 2.6       Previous INR was Therapeutic  Medication, diet, health changes since last INR:chart reviewed; none identified  Contacted within the last 12 weeks by phone on 10/4/22  Note: Procedure 10/28/22 Defibrillator Generator Replacement / No coumadin hold needed Per TE dated 10/3/22      PLAN     Buck was NOT contacted regarding therapeutic result today per home monitoring policy manage by exception agreement.   Current warfarin dose is to be continued:     Summary  As of 10/25/2022    Full warfarin instructions:  5 mg every Mon, Thu; 2.5 mg all other days; Starting 10/25/2022   Next INR check:  11/1/2022               Leana Nayak RN  Anticoagulation Clinic  10/25/2022    _______________________________________________________________________     Anticoagulation Episode Summary     Current INR goal:  2.0-3.0   TTR:  81.6 % (1 y)   Target end date:  Indefinite   Send INR reminders to:  [facility]    Indications    Persistent Atrial Fibrillation [I48.91]  Persistent atrial fibrillation (H) [I48.19]           Comments:  Home monitor ( [employer] )managed by exception         Anticoagulation Care Providers     Provider Role Specialty Phone number    Erica Hansen APRN CNP Referring Cardiovascular Disease [phone]    Domo Garrett MD Referring Cardiovascular Disease [phone]    Everett Renee MD  Cardiovascular Disease [phone]

## 2022-10-27 ENCOUNTER — PREP FOR PROCEDURE (OUTPATIENT)
Dept: CARDIOLOGY | Facility: CLINIC | Age: 77
End: 2022-10-27

## 2022-10-27 DIAGNOSIS — I48.19 PERSISTENT ATRIAL FIBRILLATION (H): Primary | ICD-10-CM

## 2022-10-27 DIAGNOSIS — I27.21 PAH (PULMONARY ARTERY HYPERTENSION) (H): Primary | ICD-10-CM

## 2022-10-27 DIAGNOSIS — Z00.6 EXAMINATION OF PARTICIPANT OR CONTROL IN CLINICAL RESEARCH: ICD-10-CM

## 2022-10-27 PROCEDURE — 94375 RESPIRATORY FLOW VOLUME LOOP: CPT | Performed by: INTERNAL MEDICINE

## 2022-10-27 PROCEDURE — 94726 PLETHYSMOGRAPHY LUNG VOLUMES: CPT | Performed by: INTERNAL MEDICINE

## 2022-10-27 PROCEDURE — 94729 DIFFUSING CAPACITY: CPT | Performed by: INTERNAL MEDICINE

## 2022-10-27 RX ORDER — LIDOCAINE 40 MG/G
CREAM TOPICAL
Status: CANCELLED | OUTPATIENT
Start: 2022-10-27

## 2022-10-27 RX ORDER — FENTANYL CITRATE 50 UG/ML
25 INJECTION, SOLUTION INTRAMUSCULAR; INTRAVENOUS
Status: CANCELLED | OUTPATIENT
Start: 2022-10-28

## 2022-10-27 RX ORDER — CEFAZOLIN SODIUM 1 G/50ML
2000 SOLUTION INTRAVENOUS
Status: CANCELLED | OUTPATIENT
Start: 2022-10-28

## 2022-10-27 RX ORDER — SODIUM CHLORIDE 9 MG/ML
100 INJECTION, SOLUTION INTRAVENOUS CONTINUOUS
Status: CANCELLED | OUTPATIENT
Start: 2022-10-28

## 2022-10-28 ENCOUNTER — TRANSFERRED RECORDS (OUTPATIENT)
Dept: HEALTH INFORMATION MANAGEMENT | Facility: CLINIC | Age: 77
End: 2022-10-28

## 2022-10-28 ENCOUNTER — HOSPITAL ENCOUNTER (OUTPATIENT)
Facility: HOSPITAL | Age: 77
Discharge: HOME | End: 2022-10-28
Attending: INTERNAL MEDICINE | Admitting: INTERNAL MEDICINE
Payer: OTHER MISCELLANEOUS

## 2022-10-28 VITALS
BODY MASS INDEX: 32.58 KG/M2 | TEMPERATURE: 97.7 F | DIASTOLIC BLOOD PRESSURE: 84 MMHG | SYSTOLIC BLOOD PRESSURE: 133 MMHG | OXYGEN SATURATION: 94 % | HEIGHT: 75 IN | WEIGHT: 262 LBS | RESPIRATION RATE: 28 BRPM | HEART RATE: 61 BPM

## 2022-10-28 DIAGNOSIS — I25.5 ISCHEMIC CARDIOMYOPATHY: Primary | ICD-10-CM

## 2022-10-28 DIAGNOSIS — I42.9 CARDIOMYOPATHY (H): ICD-10-CM

## 2022-10-28 DIAGNOSIS — Z45.02 ICD (IMPLANTABLE CARDIOVERTER-DEFIBRILLATOR) BATTERY DEPLETION: ICD-10-CM

## 2022-10-28 DIAGNOSIS — I48.19 PERSISTENT ATRIAL FIBRILLATION (H): ICD-10-CM

## 2022-10-28 LAB
ANION GAP SERPL CALCULATED.3IONS-SCNC: 11 MMOL/L (ref 7–15)
BUN SERPL-MCNC: 37.1 MG/DL (ref 8–23)
CALCIUM SERPL-MCNC: 9 MG/DL (ref 8.8–10.2)
CHLORIDE SERPL-SCNC: 103 MMOL/L (ref 98–107)
CREAT SERPL-MCNC: 1.54 MG/DL (ref 0.67–1.17)
DEPRECATED HCO3 PLAS-SCNC: 26 MMOL/L (ref 22–29)
ERYTHROCYTE [DISTWIDTH] IN BLOOD BY AUTOMATED COUNT: 15.4 % (ref 10–15)
GFR SERPL CREATININE-BSD FRML MDRD: 46 ML/MIN/1.73M2
GLUCOSE SERPL-MCNC: 107 MG/DL (ref 70–99)
HCT VFR BLD AUTO: 33.8 % (ref 40–53)
HGB BLD-MCNC: 10.4 G/DL (ref 13.3–17.7)
INR PPP: 2.62 (ref 0.85–1.15)
MCH RBC QN AUTO: 31 PG (ref 26.5–33)
MCHC RBC AUTO-ENTMCNC: 30.8 G/DL (ref 31.5–36.5)
MCV RBC AUTO: 101 FL (ref 78–100)
PLATELET # BLD AUTO: 150 10E3/UL (ref 150–450)
POTASSIUM SERPL-SCNC: 3.9 MMOL/L (ref 3.4–5.3)
RBC # BLD AUTO: 3.35 10E6/UL (ref 4.4–5.9)
SODIUM SERPL-SCNC: 140 MMOL/L (ref 136–145)
WBC # BLD AUTO: 7 10E3/UL (ref 4–11)

## 2022-10-28 PROCEDURE — 272N000001 HC OR GENERAL SUPPLY STERILE: Performed by: INTERNAL MEDICINE

## 2022-10-28 PROCEDURE — 85610 PROTHROMBIN TIME: CPT | Performed by: INTERNAL MEDICINE

## 2022-10-28 PROCEDURE — 85048 AUTOMATED LEUKOCYTE COUNT: CPT | Performed by: INTERNAL MEDICINE

## 2022-10-28 PROCEDURE — 99152 MOD SED SAME PHYS/QHP 5/>YRS: CPT | Performed by: INTERNAL MEDICINE

## 2022-10-28 PROCEDURE — 99153 MOD SED SAME PHYS/QHP EA: CPT | Performed by: INTERNAL MEDICINE

## 2022-10-28 PROCEDURE — C1721 AICD, DUAL CHAMBER: HCPCS | Performed by: INTERNAL MEDICINE

## 2022-10-28 PROCEDURE — 36415 COLL VENOUS BLD VENIPUNCTURE: CPT | Performed by: INTERNAL MEDICINE

## 2022-10-28 PROCEDURE — 258N000003 HC RX IP 258 OP 636: Performed by: INTERNAL MEDICINE

## 2022-10-28 PROCEDURE — 82310 ASSAY OF CALCIUM: CPT | Performed by: INTERNAL MEDICINE

## 2022-10-28 PROCEDURE — 33263 RMVL & RPLCMT DFB GEN 2 LEAD: CPT | Performed by: INTERNAL MEDICINE

## 2022-10-28 PROCEDURE — 250N000009 HC RX 250: Performed by: INTERNAL MEDICINE

## 2022-10-28 PROCEDURE — 250N000011 HC RX IP 250 OP 636: Performed by: INTERNAL MEDICINE

## 2022-10-28 DEVICE — DEFIB ICD COBALT XT IMPLANTABLE DDPA2D4: Type: IMPLANTABLE DEVICE | Site: CHEST  WALL | Status: FUNCTIONAL

## 2022-10-28 RX ORDER — FENTANYL CITRATE 50 UG/ML
INJECTION, SOLUTION INTRAMUSCULAR; INTRAVENOUS
Status: DISCONTINUED | OUTPATIENT
Start: 2022-10-28 | End: 2022-10-28 | Stop reason: HOSPADM

## 2022-10-28 RX ORDER — CEFAZOLIN SODIUM 1 G/50ML
2000 SOLUTION INTRAVENOUS
Status: DISCONTINUED | OUTPATIENT
Start: 2022-10-28 | End: 2022-10-28

## 2022-10-28 RX ORDER — FENTANYL CITRATE 50 UG/ML
25 INJECTION, SOLUTION INTRAMUSCULAR; INTRAVENOUS
Status: DISCONTINUED | OUTPATIENT
Start: 2022-10-28 | End: 2022-10-28 | Stop reason: HOSPADM

## 2022-10-28 RX ORDER — ACETAMINOPHEN 325 MG/1
650 TABLET ORAL EVERY 4 HOURS PRN
Status: DISCONTINUED | OUTPATIENT
Start: 2022-10-28 | End: 2022-10-28 | Stop reason: HOSPADM

## 2022-10-28 RX ORDER — SODIUM CHLORIDE 9 MG/ML
100 INJECTION, SOLUTION INTRAVENOUS CONTINUOUS
Status: DISCONTINUED | OUTPATIENT
Start: 2022-10-28 | End: 2022-10-28 | Stop reason: HOSPADM

## 2022-10-28 RX ORDER — LIDOCAINE 40 MG/G
CREAM TOPICAL
Status: DISCONTINUED | OUTPATIENT
Start: 2022-10-28 | End: 2022-10-28 | Stop reason: HOSPADM

## 2022-10-28 RX ADMIN — SODIUM CHLORIDE 100 ML/HR: 9 INJECTION, SOLUTION INTRAVENOUS at 07:56

## 2022-10-28 RX ADMIN — VANCOMYCIN HYDROCHLORIDE 1000 MG: 1 INJECTION, POWDER, LYOPHILIZED, FOR SOLUTION INTRAVENOUS at 11:14

## 2022-10-28 ASSESSMENT — ACTIVITIES OF DAILY LIVING (ADL)
ADLS_ACUITY_SCORE: 35

## 2022-10-28 NOTE — Clinical Note
Site: [facility]  Type:  Medtronic AEX   SJN Cautery Serial Number:-[id number],  [id number]

## 2022-10-28 NOTE — DISCHARGE INSTRUCTIONS
[facility] [place]  Cardiac Electrophysiology  [address]  [place] [address]   Office: [phone]  Fax: [phone]     Cardiac Electrophysiology - ICD Generator change Discharge Instructions      PROCEDURE   ICD generator change          MEDICATION INSTRUCTIONS   Continue taking all home meds.         WOUND CARE   Leave the large overlying dressing in place until 2 days after discharge - this dressing can thereafter be removed by gently pulling off.  Underneath the large dressing will be steri-strips. DO NOT REMOVE these strips; please leave these in place until you are seen in clinic.  DO NOT COVER the site with any bandages or dressings. The site should be kept dry for 7 days - please avoid traditional showers or baths during this time.  Keep the site clean and dry.  No swimming, sauna, or hot tub use until incision is completely healed.         ACTIVITY   Be physically active every day.  Moving your arm is important       WHAT TO WATCH OUT FOR   Contact our office or seek emergency care for any of the following:  Drainage, bleeding or oozing from the site  Redness, increased swelling, or warmth around the device site  Pain not treated with prescribed pain medication  Temperature greater than 100.4F  Chest pain, shortness of breath, dizziness/fainting         FOLLOW-UP   Our office will coordinate a follow-up visit visit in clinic for a device interrogation and an incision check 7-14 days after your procedure.   Please contact us at [phone] with any issues during your recovery.

## 2022-10-28 NOTE — Clinical Note
The site was marked. Prepped: left chest and left neck. Prepped with: ChloraPrep. The patient was draped. .Pre-procedure site marking:Insertion site not predetermined

## 2022-10-28 NOTE — PRE-PROCEDURE
GENERAL PRE-PROCEDURE:   Procedure:  ICD generator change  Date/Time:  10/28/2022 10:23 AM    Written consent obtained?: Yes    Risks and benefits: Risks, benefits and alternatives were discussed    Consent given by:  Patient  Patient states understanding of procedure being performed: Yes    Patient's understanding of procedure matches consent: Yes    Procedure consent matches procedure scheduled: Yes    Expected level of sedation:  Moderate  Appropriately NPO:  Yes  ASA Class:  3 (ICD in situ; NICM, CAD, pHTN, CKD Stage III, HTN, HLD, PerAF, COPD, Class I obesity; BMI 32.75kg/m2)  Mallampati  :  Grade 3- soft palate visible, posterior pharyngeal wall not visible  Lungs:  Lungs clear with good breath sounds bilaterally  Heart:  Systolic murmur  History & Physical reviewed:  History and physical reviewed and no updates needed  Statement of review:  I have reviewed the lab findings, diagnostic data, medications, and the plan for sedation

## 2022-10-28 NOTE — INTERVAL H&P NOTE
"I have reviewed the surgical (or preoperative) H&P that is linked to this encounter, and examined the patient. There are no significant changes    Clinical Conditions Present on Arrival:  Clinically Significant Risk Factors Present on Admission                  # Drug Induced Coagulation Defect: home medication list includes an anticoagulant medication   # Obesity: Estimated body mass index is 32.75 kg/m  as calculated from the following:    Height as of this encounter: 1.905 m (6' 3\").    Weight as of this encounter: 118.8 kg (262 lb).       "

## 2022-10-28 NOTE — H&P
H/P was performed at [facility] on 10/25/2022    Scan on 10/26/2022 7:59 AM by Outside, Provider: NAHID

## 2022-10-31 ENCOUNTER — DOCUMENTATION ONLY (OUTPATIENT)
Dept: ANTICOAGULATION | Facility: CLINIC | Age: 77
End: 2022-10-31

## 2022-10-31 NOTE — PROGRESS NOTES
ANTICOAGULATION  MANAGEMENT: Discharge Review    Buck Sinclair chart reviewed for anticoagulation continuity of care    Outpatient surgery/procedure on 10/28/2022 for Cardioverter Defibrillator Generator Replacement.    Discharge disposition: Home    Results:    Recent labs: (last 7 days)     10/25/22  0000 10/28/22  0752   INR 2.6 2.62*     Anticoagulation inpatient management:     not applicable     Anticoagulation discharge instructions:     Warfarin dosing: home regimen continued   Bridging: No   INR goal change: No      Medication changes affecting anticoagulation: No    Additional factors affecting anticoagulation: No     PLAN     No adjustment to anticoagulation plan needed. Pt did not hold warfarin for procedure. INR previously scheduled for 11/01 is appropriate.    Patient not contacted    No adjustment to Anticoagulation Calendar was required    Duc Samuel RN

## 2022-11-01 ENCOUNTER — DOCUMENTATION ONLY (OUTPATIENT)
Dept: ANTICOAGULATION | Facility: CLINIC | Age: 77
End: 2022-11-01

## 2022-11-01 DIAGNOSIS — I48.19 PERSISTENT ATRIAL FIBRILLATION (H): Primary | ICD-10-CM

## 2022-11-01 LAB — INR HOME MONITORING: 2.8 (ref 2–3)

## 2022-11-01 NOTE — PROGRESS NOTES
ANTICOAGULATION  MANAGEMENT-Home Monitor Managed by Exception    Buck Sinclair 77 year old male is on warfarin with therapeutic INR result. (Goal INR 2.0-3.0)    Recent labs: (last 7 days)     11/01/22  0000   INR 2.8       Previous INR was Therapeutic  Medication, diet, health changes since last INR:chart reviewed; none identified  Contacted within the last 12 weeks by phone on 10/4/22      LUCAS Jane was NOT contacted regarding therapeutic result today per home monitoring policy manage by exception agreement.   Current warfarin dose is to be continued:     Summary  As of 11/1/2022    Full warfarin instructions:  5 mg every Mon, Thu; 2.5 mg all other days; Starting 11/1/2022   Next INR check:  11/8/2022               Leana Nayak RN  Anticoagulation Clinic  11/1/2022    _______________________________________________________________________     Anticoagulation Episode Summary     Current INR goal:  2.0-3.0   TTR:  81.6 % (1 y)   Target end date:  Indefinite   Send INR reminders to:  [facility]    Indications    Persistent Atrial Fibrillation [I48.91]  Persistent atrial fibrillation (H) [I48.19]           Comments:  Home monitor ( [employer] )managed by exception         Anticoagulation Care Providers     Provider Role Specialty Phone number    Erica Hansen APRN CNP Referring Cardiovascular Disease [phone]    Domo Garrett MD Referring Cardiovascular Disease [phone]    Everett Renee MD  Cardiovascular Disease [phone]

## 2022-11-07 ENCOUNTER — TELEPHONE (OUTPATIENT)
Dept: CARDIOLOGY | Facility: CLINIC | Age: 77
End: 2022-11-07

## 2022-11-07 ENCOUNTER — ANCILLARY PROCEDURE (OUTPATIENT)
Dept: CARDIOLOGY | Facility: CLINIC | Age: 77
End: 2022-11-07
Payer: OTHER MISCELLANEOUS

## 2022-11-07 DIAGNOSIS — Z95.810 ICD (IMPLANTABLE CARDIOVERTER-DEFIBRILLATOR) IN PLACE: ICD-10-CM

## 2022-11-07 DIAGNOSIS — I42.9 CARDIOMYOPATHY, UNSPECIFIED TYPE (H): ICD-10-CM

## 2022-11-07 LAB
DLCOUNC-%PRED-PRE: 34 %
DLCOUNC-PRE: 10.07 ML/MIN/MMHG
DLCOUNC-PRED: 28.8 ML/MIN/MMHG
ERV-%PRED-PRE: 71 %
ERV-PRE: 0.71 L
ERV-PRED: 0.99 L
EXPTIME-PRE: 6.96 SEC
FEF2575-%PRED-PRE: 41 %
FEF2575-PRE: 0.99 L/SEC
FEF2575-PRED: 2.41 L/SEC
FEFMAX-%PRED-PRE: 74 %
FEFMAX-PRE: 6.49 L/SEC
FEFMAX-PRED: 8.77 L/SEC
FEV1-%PRED-PRE: 57 %
FEV1-PRE: 1.98 L
FEV1FEV6-PRE: 63 %
FEV1FEV6-PRED: 77 %
FEV1FVC-PRE: 61 %
FEV1FVC-PRED: 74 %
FEV1SVC-PRE: 59 %
FEV1SVC-PRED: 63 %
FIFMAX-PRE: 5.89 L/SEC
FRCPLETH-%PRED-PRE: 88 %
FRCPLETH-PRE: 3.61 L
FRCPLETH-PRED: 4.06 L
FVC-%PRED-PRE: 68 %
FVC-PRE: 3.23 L
FVC-PRED: 4.72 L
IC-%PRED-PRE: 59 %
IC-PRE: 2.65 L
IC-PRED: 4.48 L
RVPLETH-%PRED-PRE: 97 %
RVPLETH-PRE: 2.9 L
RVPLETH-PRED: 2.96 L
TLCPLETH-%PRED-PRE: 76 %
TLCPLETH-PRE: 6.26 L
TLCPLETH-PRED: 8.14 L
VA-%PRED-PRE: 73 %
VA-PRE: 5.47 L
VC-%PRED-PRE: 61 %
VC-PRE: 3.36 L
VC-PRED: 5.48 L

## 2022-11-07 PROCEDURE — 93283 PRGRMG EVAL IMPLANTABLE DFB: CPT | Performed by: INTERNAL MEDICINE

## 2022-11-07 NOTE — TELEPHONE ENCOUNTER
11/7 lvm for pt home and wife gaby to resched jamel to 11/29 so we can sched echo same day. I left them my personal number to coordinate NH

## 2022-11-07 NOTE — TELEPHONE ENCOUNTER
----- Message from Anabell Lemus RN sent at 11/7/2022 10:27 AM CST -----  Yes please  get an Echocardiogram as well  ----- Message -----  From: Fay Barboza  Sent: 11/7/2022   9:28 AM CST  To: Anabell Pierson RN    Does he need an echo too? I see an order.  ----- Message -----  From: Doreen Quiroga  Sent: 11/4/2022   1:00 PM CST  To: Clinic Coordinators-Card-      ----- Message -----  From: Anabell Lemus RN  Sent: 11/3/2022   9:03 AM CDT  To: , #    Can you please call pt and get him scheduled for follow up with Labs.    This is not urgent and can be a few weeks out.  Ok to be with DULCE Arteaga or Jose Morales MD.    Anabell Lemus RN BSN [facility]

## 2022-11-08 ENCOUNTER — OFFICE VISIT (OUTPATIENT)
Dept: CARDIOLOGY | Facility: CLINIC | Age: 77
End: 2022-11-08
Payer: OTHER MISCELLANEOUS

## 2022-11-08 ENCOUNTER — DOCUMENTATION ONLY (OUTPATIENT)
Dept: ANTICOAGULATION | Facility: CLINIC | Age: 77
End: 2022-11-08

## 2022-11-08 VITALS
BODY MASS INDEX: 32.58 KG/M2 | RESPIRATION RATE: 16 BRPM | HEIGHT: 75 IN | HEART RATE: 77 BPM | DIASTOLIC BLOOD PRESSURE: 52 MMHG | SYSTOLIC BLOOD PRESSURE: 90 MMHG | WEIGHT: 262 LBS

## 2022-11-08 DIAGNOSIS — N18.30 STAGE 3 CHRONIC KIDNEY DISEASE, UNSPECIFIED WHETHER STAGE 3A OR 3B CKD (H): ICD-10-CM

## 2022-11-08 DIAGNOSIS — I50.32 CHRONIC HEART FAILURE WITH PRESERVED EJECTION FRACTION (H): Primary | ICD-10-CM

## 2022-11-08 DIAGNOSIS — I48.19 PERSISTENT ATRIAL FIBRILLATION (H): ICD-10-CM

## 2022-11-08 DIAGNOSIS — E78.5 HYPERLIPIDEMIA WITH TARGET LDL LESS THAN 70: ICD-10-CM

## 2022-11-08 DIAGNOSIS — I25.10 CORONARY ARTERY DISEASE INVOLVING NATIVE CORONARY ARTERY OF NATIVE HEART WITHOUT ANGINA PECTORIS: ICD-10-CM

## 2022-11-08 DIAGNOSIS — J44.9 CHRONIC OBSTRUCTIVE PULMONARY DISEASE, UNSPECIFIED COPD TYPE (H): ICD-10-CM

## 2022-11-08 DIAGNOSIS — Z95.810 ICD (IMPLANTABLE CARDIOVERTER-DEFIBRILLATOR) IN PLACE: ICD-10-CM

## 2022-11-08 DIAGNOSIS — I48.19 PERSISTENT ATRIAL FIBRILLATION (H): Primary | ICD-10-CM

## 2022-11-08 DIAGNOSIS — I10 ESSENTIAL HYPERTENSION: ICD-10-CM

## 2022-11-08 DIAGNOSIS — I27.20 PULMONARY HYPERTENSION (H): ICD-10-CM

## 2022-11-08 LAB — INR HOME MONITORING: 2.5 (ref 2–3)

## 2022-11-08 PROCEDURE — 99214 OFFICE O/P EST MOD 30 MIN: CPT | Performed by: GENERAL ACUTE CARE HOSPITAL

## 2022-11-08 NOTE — PROGRESS NOTES
ANTICOAGULATION  MANAGEMENT-Home Monitor Managed by Exception    Buck Sinclair 77 year old male is on warfarin with therapeutic INR result. (Goal INR 2.0-3.0)    Recent labs: (last 7 days)     11/08/22  0000   INR 2.5       Previous INR was Therapeutic  Medication, diet, health changes since last INR:chart reviewed; none identified  Contacted within the last 12 weeks by phone on 10/4/22      LUCAS Jane was NOT contacted regarding therapeutic result today per home monitoring policy manage by exception agreement.   Current warfarin dose is to be continued:     Summary  As of 11/8/2022    Full warfarin instructions:  5 mg every Mon, Thu; 2.5 mg all other days; Starting 11/8/2022   Next INR check:  11/15/2022               Leana Nayak RN  Anticoagulation Clinic  11/8/2022    _______________________________________________________________________     Anticoagulation Episode Summary     Current INR goal:  2.0-3.0   TTR:  81.6 % (1 y)   Target end date:  Indefinite   Send INR reminders to:  [facility]    Indications    Persistent Atrial Fibrillation [I48.91]  Persistent atrial fibrillation (H) [I48.19]           Comments:  Home monitor ( [employer] )managed by exception         Anticoagulation Care Providers     Provider Role Specialty Phone number    Erica Hansen APRN CNP Referring Cardiovascular Disease [phone]    Domo Garrett MD Referring Cardiovascular Disease [phone]    Everett Renee MD  Cardiovascular Disease [phone]

## 2022-11-08 NOTE — LETTER
11/8/2022    Vivek Guerrero MD  [address]  [facility] [address]    RE: Buck Sinclair       Dear Colleague,     I had the pleasure of seeing Buck Sinclair in the [facility].  HEART CARE ENCOUNTER NOTE        Assessment/Recommendations   Assessment:    1. Chronic congestive heart failure with reduced left ventricular ejection fraction with left ventricular ejection fraction improved to 50% due to nonischemic dilated cardiomyopathy (out of proportion to his degree of coronary artery disease). NYHA class III. Cardiac catheterization on 1/24/2022 showed normal left-sided filling pressures. He seems euvolemic today.  2. Coronary artery disease status post drug-eluting stenting x3 to the homakxzf-fcaooaa-eclczk left anterior descending artery on 9/20/2017. Coronary angiography on 1/24/2022 showed patent stents and no other significant coronary artery disease. Denies angina.  3. Moderate-to-severe pulmonary hypertension. 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  4. Medtronic dual-chamber implantable cardiac defibrillator placement on 6/11/2014 and generator change on 10/28/2022 for primary prevention of sudden cardiac death.  5. History of Medtronic dual-chamber permanent pacemaker placement in 1999 with generator replacement in 2005 and device removal in 2014.  6. Persistent atrial fibrillation status post multiple electrical cardioversions, most recently on 11/19/2021. He underwent complex catheter ablation x2 in 2011. Overall fairly low burden of atrial arrhythmia based on his device interrogations. [id number] score is at least 5 (congestive heart failure, hypertension, age 75 or greater, coronary artery disease).  7. Benign essential hypertension. Blood pressure has been low on several occasions recently.  8. Hyperlipidemia. Last LDL 55 mg/dL.  9. Severe chronic obstructive pulmonary disease on home oxygen.  10. 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  11. BMI  32.75.    Plan:  1. Furosemide 80 mg twice daily with an extra 40 mg if he retains extra fluid.  2. Continue sotalol 160 mg twice daily.  3. Anticoagulation with warfarin goal INR 2-3.  4. Losartan 50 mg daily but suggested he try cutting the dose in half to see if this helps with his low blood pressure and lightheadedness.  5. Atorvastatin 40 mg daily.  6. He has been seen in pulmonology clinic with Dr. Payton and pulmonary hypertension clinic with Dr. Morales.  7. Follows in device clinic.  8. Follow-up with me in 6 months.         History of Present Illness   Mr. Buck Sinclair is a 77 year old male with a significant past history of  HFrEF due to nonischemic cardiomyopathy (out of proportion to CAD) with LVEF most recently noted to be 50%, persistent AF s/p multiple electrical cardioversions and extensive catheter ablations x2 in 2011, CAD s/p CANDELARIA x3 to the vujnvqki-[id number] LAD, COPD on home O2, Medtronic dual-chamber permanent pacemaker placement in 1999 replaced with a Medtronic dual-chamber implantable cardiac defibrillator on 6/11/2014, PHTN, HTN, and HLD presenting for follow-up.    He had his ICD generator successfully changed on 10/28/2022. He was previously on a study investigating inhaled  Tyvaso but says it was not working and he is no longer receiving the drug. His weight has been fairly stable but he takes an half pill of furosemide if his weight increases and this seems to work. He does feel lightheaded when standing occasionally.    Stable dyspnea on exertion. Very mild swelling in his ankles. No chest pain/pressure/tightness, pre-syncope, syncope, palpitations, paroxysmal nocturnal dyspnea (PND), or orthopnea.     Cardiac Problems and Cardiac Diagnostics     Most Recent Cardiac testing:  ECG dated 1/24/2022 (personaly reviewed and interpreted): atrial-paced, right bundle branch block QRS duration 126 ms, inferior infarct     Device check 11/7/2022 (report reviewed):  Encounter Type: 1-week-  Post implant, Device Check. Patient seen in clinic for device evaluation and iterative programming.   Device: Medtronic, Cobalt (D) ICD  Pacing %/Programmed: AP- 95 %, - 4.7%, AAIR<=>DDDR 60/130   Lead(s): Stable  Presenting Rhythm: AP/VS @ 61 bpm  Underlying Rhythm: Severe SB @ <30 bpm and 1AVB  Heart Rates: Histograms show primarily 60-80 bpm.  Battery Longevity: Estimated 11 years remaining.  Atrial Arrhythmia: 18 mode switches logged; EGMs suggest AF with the longest episode lasting ~1 hour, [place] 0.8%, ATP effective 17.6% of time. V-rates >= 120 bpm ~20-30%. Patient unable to correlate symptoms.    Anticoagulant: Warfarin  Ventricular Arrhythmia: None  Incision/Complications: Incision site was clean, dry, and intact. Free from any redness, swelling, discharge, or signs of infection. Reviewed incision care/monitoring, activity restrictions.   Teaching/Education: Reviewed routine follow-up care, when to call the clinic and answered questions.  Comments: Normal device function. No Changes made.    ECHO 12/8/2021 (report reviewed):   1. Left ventricular systolic function is normal. The left ventricle is normal  in size. There is normal left ventricular wall thickness.Left ventricular  diastolic function is normal. No regional wall motion abnormalities noted.  2. The right ventricle is mildly dilated. Mildly decreased right ventricular  systolic function.  3. There is mild to moderate (1-2+) tricuspid regurgitation.  4. The right ventricular systolic pressure is approximated at 45.7mmHg plus  the right atrial pressure.Right ventricular systolic pressure is elevated,  consistent with moderate pulmonary hypertension. Normal RA pressure of 3 mmHg.  6. The ascending aorta is mildly dilated at 42 mm.     Stress test 11/7/2019 (report reviewed):     The nuclear stress test is abnormal.     There is a small area of nontransmural infarction in the apical segment(s) of the left ventricle.     The left ventricular ejection  "fraction at stress is 63%.     A prior study was conducted on 7/19/2018.  This study has changes noted when compared with the prior study. The apical defect no longer demonstrates any ischemia.     Cardiac cath 1/24/2022 (report reviewed):     Mild coronary artery disease with patent LAD stents.    LVEDP and PCWP are normal. The PA pressure was elevated at 66/24 mmHg with a mean pressure of 37 mmHg. See numbers below.    Shikha and TD cardiac outputs were both 5.7 l/min.    Sats on 2L NC oxygen: Ao 93%, PA 60%, RA 59%     Medications  Allergies   Current Outpatient Medications   Medication Sig Dispense Refill     acetaminophen (TYLENOL) 325 MG tablet Take 650 mg by mouth 2 times daily       albuterol (PROAIR HFA/PROVENTIL HFA/VENTOLIN HFA) 108 (90 Base) MCG/ACT inhaler Inhale 2 puffs into the lungs every 4 hours as needed for shortness of breath / dyspnea or wheezing       Ascorbic Acid (VITAMIN C) 500 MG CAPS Take 1,000 mg by mouth daily       atorvastatin (LIPITOR) 40 MG tablet Take 40 mg by mouth daily       budesonide-formoterol (SYMBICORT) 160-4.5 MCG/ACT Inhaler Inhale 2 puffs into the lungs 2 times daily       co-enzyme Q-10 100 MG CAPS capsule Take 2 capsules (200 mg) by mouth daily 2 capsule      fish oil-omega-3 fatty acids 1000 MG capsule Take 1 g by mouth 2 times daily       FLUoxetine (PROZAC) 20 MG capsule TAKE 1 CAPSULE BY MOUTH EVERY DAY       furosemide (LASIX) 80 MG tablet Take 1 tablet (80 mg) by mouth 2 times daily 90 tablet 6     losartan (COZAAR) 50 MG tablet Take 1 tablet (50 mg) by mouth daily 90 tablet 3     MAG64 64 MG TBEC CR tablet TAKE 2 TABLETS BY MOUTH EVERY  tablet 1     multivitamin, therapeutic (THERA-VIT) TABS tablet Take 1 tablet by mouth daily       nitroGLYcerin (NITROSTAT) 0.4 MG sublingual tablet One tablet under the tongue every 5 minutes if needed for chest pain. May repeat every 5 minutes for a maximum of 3 doses in 15 minutes\" 25 tablet 3     OXYGEN-HELIUM IN 4-5 L    " "    polyethylene glycol (MIRALAX) 17 GM/Dose powder Take 17 g by mouth daily        sotalol (BETAPACE) 160 MG tablet TAKE 1 TABLET(160 MG) BY MOUTH TWICE DAILY 180 tablet 1     tiotropium (SPIRIVA) 18 MCG inhaled capsule Inhale 18 mcg into the lungs daily       vitamin D2 (ERGOCALCIFEROL) 04081 units (1250 mcg) capsule TAKE 1 CAPSULE BY MOUTH 2 TIMES A WEEK       warfarin ANTICOAGULANT (COUMADIN) 2.5 MG tablet As of 6/23: 5 mg every Mon, Thu; 2.5 mg all other days 116 tablet 1      Allergies   Allergen Reactions     Adhesive Tape Other (See Comments)     ADHESIVE TAPE; SKIN IRRITATION; Skin pulled off with foam tape       Amiodarone      ADVERSE REACTION.  Sunlight sensitivity.     Lisinopril         Physical Examination Review of Systems   BP 90/52 (BP Location: Right arm, Patient Position: Sitting, Cuff Size: Adult Regular)   Pulse 77   Resp 16   Ht 1.905 m (6' 3\")   Wt 118.8 kg (262 lb)   BMI 32.75 kg/m    Body mass index is 32.75 kg/m .  Wt Readings from Last 3 Encounters:   11/08/22 118.8 kg (262 lb)   10/28/22 118.8 kg (262 lb)   05/17/22 126.6 kg (279 lb)       General Appearance:   Pleasant  male, appears  stated age. no acute distress, normal body habitus   ENT/Mouth: Facemask.   EYES:  no scleral icterus, normal conjunctivae   Neck: no carotid bruits. No anterior cervical lymphadenopaty   Respiratory:   Diminished breath sounds, no rales or wheezing, equal chest wall expansion    Cardiovascular:   Regular rhythm, normal rate. Normal first and second heart sounds with no murmurs, rubs, or gallops; the carotid, radial and posterior tibial pulses are intact, Jugular venous pressure not elevated, trace lower extremity edema bilaterally    Abdomen/GI:  no organomegaly, masses, bruits, or tenderness; bowel sounds are present   Extremities: no cyanosis or clubbing   Skin: no xanthelasma, warm.    Heme/lymph/ Immunology No apparent bleeding noted.   Neurologic: Alert and oriented. normal gait, no tremors   "   Psychiatric: Pleasant, calm, appropriate affect.    A complete 10 system review of systems was performed and is negative except as mentioned in the HPI/subjective.         Past History   Past Medical History:   Past Medical History:   Diagnosis Date     Anemia      Asthma without status asthmaticus 5/5/2021     BPH (benign prostatic hyperplasia)      Cardiomyopathy (H)      COPD, group B, by GOLD 2017 classification (H)      Coronary artery disease due to calcified coronary lesion      Dyslipidemia, goal LDL below 70      Essential hypertension      History of transfusion      Persistent atrial fibrillation (H)      Pneumonia of left lower lobe due to infectious organism 10/4/2017     Skin cancer of trunk      Status post catheter ablation of atrial fibrillation 6/7/2017    PVI 4-2011 (Cryo/PVI + roof line + CTI line) Re-do PVI 7-2011 (RFA/PVI + CFE + VIDYA + confirmed CTI line)     Ventricular tachycardia        Past Surgical History:   Past Surgical History:   Procedure Laterality Date     CARDIAC DEFIBRILLATOR PLACEMENT       CARDIOVERSION  07/11/2018    x20, last 2/12/15, 10/2015, 11/18/16, 6/16/17 by Lauren Foster CNP     CARDIOVERSION  07/11/2018     CARDIOVERSION  11/19/2021     COLONOSCOPY N/A 4/28/2017    Procedure: COLONOSCOPY with 2 ascending polyps and 1 transverse polyp;  Surgeon: Jose Whittington MD;  Location: [facility] GI;  Service:      CV CORONARY ANGIOGRAM N/A 9/20/2017    Procedure: Coronary Angiogram;  Surgeon: Sergio Cervantes MD;  Location: [facility] Cath Lab;  Service:      CV CORONARY ANGIOGRAM N/A 1/24/2022    Procedure: Coronary Angiogram;  Surgeon: Christi Saunders MD;  Location: [facility] CATH [facility] CV     CV LEFT HEART CATH N/A 1/24/2022    Procedure: Left Heart Cath;  Surgeon: Christi Saunders MD;  Location: [facility] CATH LAB CV     CV RIGHT AND LEFT HEART CATH N/A 1/24/2022    Procedure: Right and Left Heart Catherization;  Surgeon: Christi Saunders MD;  Location: [facility] CATH LAB  CV     EP ICD GENERATOR REPLACEMENT DUAL N/A 10/28/2022    Procedure: Implantable Cardioverter Defibrillator Generator Replacement Dual;  Surgeon: Elo Collins MD;  Location: [facility] LAB CV     EP ICD INSERT       FRACTURE SURGERY Left     wrist     INGUINAL HERNIA REPAIR Left     while in the [employer] in [place] after 13 month in [place]     INSERT / REPLACE / REMOVE PACEMAKER       IR MISCELLANEOUS PROCEDURE  2014     OTHER SURGICAL HISTORY      left hand surgery---tendon repair     [place] ABLATE HEART DYSRHYTHM FOCUS  2011    Catheter Ablation Atrial Fibrillation PVI 2011 (Cryo+RF-PVI + roof line + CTI line)     [place] ABLATE HEART DYSRHYTHM FOCUS  2011    Re-do PVI 2011 (RFA-PVI + CFE + VIDYA + confirmation of CTI line)     TOTAL SHOULDER REPLACEMENT Right 2016    Dr. Abernathy of [facility]     WRIST SURGERY Left      ZZC MYERS W/O FACETEC FORAMOT/DSKC  VRT SEG, CERVICAL      Laminectomy Lumbar;  Recorded: 2012;       Family History:   Family History   Problem Relation Age of Onset     Cancer Mother         leukemia     Cancer Father         bladder     Cancer Sister         breast with lung met.     Aneurysm Sister      CABG Brother      CABG Brother      Valvular heart disease Brother         valve replacement       Social History:   Social History     Socioeconomic History     Marital status:      Spouse name: Not on file     Number of children: Not on file     Years of education: Not on file     Highest education level: Not on file   Occupational History     Not on file   Tobacco Use     Smoking status: Former     Packs/day: 1.00     Years: 4.00     Pack years: 4.00     Types: Cigarettes     Quit date: 1968     Years since quittin.8     Smokeless tobacco: Never   Substance and Sexual Activity     Alcohol use: Yes     Alcohol/week: 2.0 standard drinks     Comment: Alcoholic Drinks/day: 1 beer per week     Drug use: No     Sexual activity: Yes      Partners: Female     Birth control/protection: Post-menopausal   Other Topics Concern     Parent/sibling w/ CABG, MI or angioplasty before 65F 55M? Not Asked   Social History Narrative    Preloaded 01/14/2013     Social Determinants of Health     Financial Resource Strain: Not on file   Food Insecurity: Not on file   Transportation Needs: Not on file   Physical Activity: Not on file   Stress: Not on file   Social Connections: Not on file   Intimate Partner Violence: Not on file   Housing Stability: Not on file              Lab Results    Chemistry/lipid CBC Cardiac Enzymes/BNP/TSH/INR   Lab Results   Component Value Date    CHOL 109 10/06/2022    HDL 58 10/06/2022    LDL 43 10/06/2022    TRIG 39 10/06/2022    CR 1.54 (H) 10/28/2022    BUN 37.1 (H) 10/28/2022    POTASSIUM 3.9 10/28/2022     10/28/2022    CO2 26 10/28/2022      Lab Results   Component Value Date    WBC 7.0 10/28/2022    HGB 10.4 (L) 10/28/2022    HCT 33.8 (L) 10/28/2022     (H) 10/28/2022     10/28/2022      Lab Results   Component Value Date     (H) 12/01/2020    NTBNP 3,598 (H) 10/05/2022    TSH 1.32 02/15/2022    INR 2.5 11/08/2022          Domo Garrett MD [facility]  Non-Invasive Cardiologist  [facility]  Pager [phone]        Thank you for allowing me to participate in the care of your patient.      Sincerely,     Domo Garrett MD     [facility]  cc:   Domo Garrett MD  [address] [facility] [address]  [place] [address]

## 2022-11-08 NOTE — PATIENT INSTRUCTIONS
If you have more low blood pressure and dizziness, we can consider splitting the losartan dose in half to 25 mg.  Continue on your current medications.  See me back in 6 months.

## 2022-11-08 NOTE — PROGRESS NOTES
HEART CARE ENCOUNTER NOTE        Assessment/Recommendations   Assessment:    Chronic congestive heart failure with reduced left ventricular ejection fraction with left ventricular ejection fraction improved to 50% due to nonischemic dilated cardiomyopathy (out of proportion to his degree of coronary artery disease). NYHA class III. Cardiac catheterization on 1/24/2022 showed normal left-sided filling pressures. He seems euvolemic today.  Coronary artery disease status post drug-eluting stenting x3 to the mhawjfwf-trfmwmf-dlgcrj left anterior descending artery on 9/20/2017. Coronary angiography on 1/24/2022 showed patent stents and no other significant coronary artery disease. Denies angina.  Moderate-to-severe pulmonary hypertension. 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  Medtronic dual-chamber implantable cardiac defibrillator placement on 6/11/2014 and generator change on 10/28/2022 for primary prevention of sudden cardiac death.  History of Medtronic dual-chamber permanent pacemaker placement in 1999 with generator replacement in 2005 and device removal in 2014.  Persistent atrial fibrillation status post multiple electrical cardioversions, most recently on 11/19/2021. He underwent complex catheter ablation x2 in 2011. Overall fairly low burden of atrial arrhythmia based on his device interrogations. [id number] score is at least 5 (congestive heart failure, hypertension, age 75 or greater, coronary artery disease).  Benign essential hypertension. Blood pressure has been low on several occasions recently.  Hyperlipidemia. Last LDL 55 mg/dL.  Severe chronic obstructive pulmonary disease on home oxygen.  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  BMI 32.75.    Plan:  Furosemide 80 mg twice daily with an extra 40 mg if he retains extra fluid.  Continue sotalol 160 mg twice daily.  Anticoagulation with warfarin goal INR 2-3.  Losartan 50 mg daily but suggested he try cutting the dose in half to see  if this helps with his low blood pressure and lightheadedness.  Atorvastatin 40 mg daily.  He has been seen in pulmonology clinic with Dr. Payton and pulmonary hypertension clinic with Dr. Morales.  Follows in device clinic.  Follow-up with me in 6 months.         History of Present Illness   Mr. Buck Sinclair is a 77 year old male with a significant past history of  HFrEF due to nonischemic cardiomyopathy (out of proportion to CAD) with LVEF most recently noted to be 50%, persistent AF s/p multiple electrical cardioversions and extensive catheter ablations x2 in 2011, CAD s/p CANDELARIA x3 to the lyszerhp-ng-dhaeuk LAD, COPD on home O2, Medtronic dual-chamber permanent pacemaker placement in 1999 replaced with a Medtronic dual-chamber implantable cardiac defibrillator on 6/11/2014, PHTN, HTN, and HLD presenting for follow-up.    He had his ICD generator successfully changed on 10/28/2022. He was previously on a study investigating inhaled  Tyvaso but says it was not working and he is no longer receiving the drug. His weight has been fairly stable but he takes an half pill of furosemide if his weight increases and this seems to work. He does feel lightheaded when standing occasionally.    Stable dyspnea on exertion. Very mild swelling in his ankles. No chest pain/pressure/tightness, pre-syncope, syncope, palpitations, paroxysmal nocturnal dyspnea (PND), or orthopnea.     Cardiac Problems and Cardiac Diagnostics     Most Recent Cardiac testing:  ECG dated 1/24/2022 (personaly reviewed and interpreted): atrial-paced, right bundle branch block QRS duration 126 ms, inferior infarct     Device check 11/7/2022 (report reviewed):  Encounter Type: 1-week- Post implant, Device Check. Patient seen in clinic for device evaluation and iterative programming.   Device: Medtronic, Cobalt (D) ICD  Pacing %/Programmed: AP- 95 %, - 4.7%, AAIR<=>DDDR 60/130   Lead(s): Stable  Presenting Rhythm: AP/VS @ 61 bpm  Underlying Rhythm: Severe  SB @ <30 bpm and 1AVB  Heart Rates: Histograms show primarily 60-80 bpm.  Battery Longevity: Estimated 11 years remaining.  Atrial Arrhythmia: 18 mode switches logged; EGMs suggest AF with the longest episode lasting ~1 hour, [place] 0.8%, ATP effective 17.6% of time. V-rates >= 120 bpm ~20-30%. Patient unable to correlate symptoms.    Anticoagulant: Warfarin  Ventricular Arrhythmia: None  Incision/Complications: Incision site was clean, dry, and intact. Free from any redness, swelling, discharge, or signs of infection. Reviewed incision care/monitoring, activity restrictions.   Teaching/Education: Reviewed routine follow-up care, when to call the clinic and answered questions.  Comments: Normal device function. No Changes made.    ECHO 12/8/2021 (report reviewed):   1. Left ventricular systolic function is normal. The left ventricle is normal  in size. There is normal left ventricular wall thickness.Left ventricular  diastolic function is normal. No regional wall motion abnormalities noted.  2. The right ventricle is mildly dilated. Mildly decreased right ventricular  systolic function.  3. There is mild to moderate (1-2+) tricuspid regurgitation.  4. The right ventricular systolic pressure is approximated at 45.7mmHg plus  the right atrial pressure.Right ventricular systolic pressure is elevated,  consistent with moderate pulmonary hypertension. Normal RA pressure of 3 mmHg.  6. The ascending aorta is mildly dilated at 42 mm.     Stress test 11/7/2019 (report reviewed):     The nuclear stress test is abnormal.     There is a small area of nontransmural infarction in the apical segment(s) of the left ventricle.     The left ventricular ejection fraction at stress is 63%.     A prior study was conducted on 7/19/2018.  This study has changes noted when compared with the prior study. The apical defect no longer demonstrates any ischemia.     Cardiac cath 1/24/2022 (report reviewed):   Mild coronary artery disease with  "patent LAD stents.  LVEDP and PCWP are normal. The PA pressure was elevated at 66/24 mmHg with a mean pressure of 37 mmHg. See numbers below.  Shikha and TD cardiac outputs were both 5.7 l/min.  Sats on 2L NC oxygen: Ao 93%, PA 60%, RA 59%     Medications  Allergies   Current Outpatient Medications   Medication Sig Dispense Refill     acetaminophen (TYLENOL) 325 MG tablet Take 650 mg by mouth 2 times daily       albuterol (PROAIR HFA/PROVENTIL HFA/VENTOLIN HFA) 108 (90 Base) MCG/ACT inhaler Inhale 2 puffs into the lungs every 4 hours as needed for shortness of breath / dyspnea or wheezing       Ascorbic Acid (VITAMIN C) 500 MG CAPS Take 1,000 mg by mouth daily       atorvastatin (LIPITOR) 40 MG tablet Take 40 mg by mouth daily       budesonide-formoterol (SYMBICORT) 160-4.5 MCG/ACT Inhaler Inhale 2 puffs into the lungs 2 times daily       co-enzyme Q-10 100 MG CAPS capsule Take 2 capsules (200 mg) by mouth daily 2 capsule      fish oil-omega-3 fatty acids 1000 MG capsule Take 1 g by mouth 2 times daily       FLUoxetine (PROZAC) 20 MG capsule TAKE 1 CAPSULE BY MOUTH EVERY DAY       furosemide (LASIX) 80 MG tablet Take 1 tablet (80 mg) by mouth 2 times daily 90 tablet 6     losartan (COZAAR) 50 MG tablet Take 1 tablet (50 mg) by mouth daily 90 tablet 3     MAG64 64 MG TBEC CR tablet TAKE 2 TABLETS BY MOUTH EVERY  tablet 1     multivitamin, therapeutic (THERA-VIT) TABS tablet Take 1 tablet by mouth daily       nitroGLYcerin (NITROSTAT) 0.4 MG sublingual tablet One tablet under the tongue every 5 minutes if needed for chest pain. May repeat every 5 minutes for a maximum of 3 doses in 15 minutes\" 25 tablet 3     OXYGEN-HELIUM IN 4-5 L        polyethylene glycol (MIRALAX) 17 GM/Dose powder Take 17 g by mouth daily        sotalol (BETAPACE) 160 MG tablet TAKE 1 TABLET(160 MG) BY MOUTH TWICE DAILY 180 tablet 1     tiotropium (SPIRIVA) 18 MCG inhaled capsule Inhale 18 mcg into the lungs daily       vitamin D2 " "(ERGOCALCIFEROL) 02503 units (1250 mcg) capsule TAKE 1 CAPSULE BY MOUTH 2 TIMES A WEEK       warfarin ANTICOAGULANT (COUMADIN) 2.5 MG tablet As of 6/23: 5 mg every Mon, Thu; 2.5 mg all other days 116 tablet 1      Allergies   Allergen Reactions     Adhesive Tape Other (See Comments)     ADHESIVE TAPE; SKIN IRRITATION; Skin pulled off with foam tape       Amiodarone      ADVERSE REACTION.  Sunlight sensitivity.     Lisinopril         Physical Examination Review of Systems   BP 90/52 (BP Location: Right arm, Patient Position: Sitting, Cuff Size: Adult Regular)   Pulse 77   Resp 16   Ht 1.905 m (6' 3\")   Wt 118.8 kg (262 lb)   BMI 32.75 kg/m    Body mass index is 32.75 kg/m .  Wt Readings from Last 3 Encounters:   11/08/22 118.8 kg (262 lb)   10/28/22 118.8 kg (262 lb)   05/17/22 126.6 kg (279 lb)       General Appearance:   Pleasant  male, appears  stated age. no acute distress, normal body habitus   ENT/Mouth: Facemask.   EYES:  no scleral icterus, normal conjunctivae   Neck: no carotid bruits. No anterior cervical lymphadenopaty   Respiratory:   Diminished breath sounds, no rales or wheezing, equal chest wall expansion    Cardiovascular:   Regular rhythm, normal rate. Normal first and second heart sounds with no murmurs, rubs, or gallops; the carotid, radial and posterior tibial pulses are intact, Jugular venous pressure not elevated, trace lower extremity edema bilaterally    Abdomen/GI:  no organomegaly, masses, bruits, or tenderness; bowel sounds are present   Extremities: no cyanosis or clubbing   Skin: no xanthelasma, warm.    Heme/lymph/ Immunology No apparent bleeding noted.   Neurologic: Alert and oriented. normal gait, no tremors     Psychiatric: Pleasant, calm, appropriate affect.    A complete 10 system review of systems was performed and is negative except as mentioned in the HPI/subjective.         Past History   Past Medical History:   Past Medical History:   Diagnosis Date     Anemia      Asthma " without status asthmaticus 5/5/2021     BPH (benign prostatic hyperplasia)      Cardiomyopathy (H)      COPD, group B, by GOLD 2017 classification (H)      Coronary artery disease due to calcified coronary lesion      Dyslipidemia, goal LDL below 70      Essential hypertension      History of transfusion      Persistent atrial fibrillation (H)      Pneumonia of left lower lobe due to infectious organism 10/4/2017     Skin cancer of trunk      Status post catheter ablation of atrial fibrillation 6/7/2017    PVI 4-2011 (Cryo/PVI + roof line + CTI line) Re-do PVI 7-2011 (RFA/PVI + CFE + VIDYA + confirmed CTI line)     Ventricular tachycardia        Past Surgical History:   Past Surgical History:   Procedure Laterality Date     CARDIAC DEFIBRILLATOR PLACEMENT       CARDIOVERSION  07/11/2018    x20, last 2/12/15, 10/2015, 11/18/16, 6/16/17 by Lauren Foster, RAJESH     CARDIOVERSION  07/11/2018     CARDIOVERSION  11/19/2021     COLONOSCOPY N/A 4/28/2017    Procedure: COLONOSCOPY with 2 ascending polyps and 1 transverse polyp;  Surgeon: Jose Whittington MD;  Location: [facility];  Service:      CV CORONARY ANGIOGRAM N/A 9/20/2017    Procedure: Coronary Angiogram;  Surgeon: Sergio Cervantes MD;  Location: [facility] Cath Lab;  Service:      CV CORONARY ANGIOGRAM N/A 1/24/2022    Procedure: Coronary Angiogram;  Surgeon: Christi Saunders MD;  Location: [facility] LAB CV     CV LEFT HEART CATH N/A 1/24/2022    Procedure: Left Heart Cath;  Surgeon: Christi Saunders MD;  Location: [facility] LAB CV     CV RIGHT AND LEFT HEART CATH N/A 1/24/2022    Procedure: Right and Left Heart Catherization;  Surgeon: Christi Saunders MD;  Location: [facility] CV     EP ICD GENERATOR REPLACEMENT DUAL N/A 10/28/2022    Procedure: Implantable Cardioverter Defibrillator Generator Replacement Dual;  Surgeon: Elo Collins MD;  Location: [facility] CV     EP ICD INSERT       FRACTURE SURGERY Left     wrist     INGUINAL  HERNIA REPAIR Left     while in the [employer] in [place] after 13 month in [place]     INSERT / REPLACE / REMOVE PACEMAKER       IR MISCELLANEOUS PROCEDURE  2014     OTHER SURGICAL HISTORY      left hand surgery---tendon repair     ND ABLATE HEART DYSRHYTHM FOCUS  2011    Catheter Ablation Atrial Fibrillation PVI 2011 (Cryo+RF-PVI + roof line + CTI line)     ND ABLATE HEART DYSRHYTHM FOCUS  2011    Re-do PVI 2011 (RFA-PVI + CFE + VIDYA + confirmation of CTI line)     TOTAL SHOULDER REPLACEMENT Right 2016    Dr. Abernathy of [facility]     WRIST SURGERY Left      ZZC MYERS W/O FACETEC FORAMOT/DSKC  VRT SEG, CERVICAL      Laminectomy Lumbar;  Recorded: 2012;       Family History:   Family History   Problem Relation Age of Onset     Cancer Mother         leukemia     Cancer Father         bladder     Cancer Sister         breast with lung met.     Aneurysm Sister      CABG Brother      CABG Brother      Valvular heart disease Brother         valve replacement       Social History:   Social History     Socioeconomic History     Marital status:      Spouse name: Not on file     Number of children: Not on file     Years of education: Not on file     Highest education level: Not on file   Occupational History     Not on file   Tobacco Use     Smoking status: Former     Packs/day: 1.00     Years: 4.00     Pack years: 4.00     Types: Cigarettes     Quit date: 1968     Years since quittin.8     Smokeless tobacco: Never   Substance and Sexual Activity     Alcohol use: Yes     Alcohol/week: 2.0 standard drinks     Comment: Alcoholic Drinks/day: 1 beer per week     Drug use: No     Sexual activity: Yes     Partners: Female     Birth control/protection: Post-menopausal   Other Topics Concern     Parent/sibling w/ CABG, MI or angioplasty before 65F 55M? Not Asked   Social History Narrative    Preloaded 2013     Social Determinants of Health     Financial Resource  Strain: Not on file   Food Insecurity: Not on file   Transportation Needs: Not on file   Physical Activity: Not on file   Stress: Not on file   Social Connections: Not on file   Intimate Partner Violence: Not on file   Housing Stability: Not on file              Lab Results    Chemistry/lipid CBC Cardiac Enzymes/BNP/TSH/INR   Lab Results   Component Value Date    CHOL 109 10/06/2022    HDL 58 10/06/2022    LDL 43 10/06/2022    TRIG 39 10/06/2022    CR 1.54 (H) 10/28/2022    BUN 37.1 (H) 10/28/2022    POTASSIUM 3.9 10/28/2022     10/28/2022    CO2 26 10/28/2022      Lab Results   Component Value Date    WBC 7.0 10/28/2022    HGB 10.4 (L) 10/28/2022    HCT 33.8 (L) 10/28/2022     (H) 10/28/2022     10/28/2022      Lab Results   Component Value Date     (H) 12/01/2020    NTBNP 3,598 (H) 10/05/2022    TSH 1.32 02/15/2022    INR 2.5 11/08/2022          Domo Garrett MD [facility]  Non-Invasive Cardiologist  [facility]  Pager [phone]

## 2022-11-11 ENCOUNTER — TRANSFERRED RECORDS (OUTPATIENT)
Dept: HEALTH INFORMATION MANAGEMENT | Facility: CLINIC | Age: 77
End: 2022-11-11

## 2022-11-11 LAB
MDC_IDC_EPISODE_DTM: NORMAL
MDC_IDC_EPISODE_DURATION: 109 S
MDC_IDC_EPISODE_DURATION: 119 S
MDC_IDC_EPISODE_DURATION: 133 S
MDC_IDC_EPISODE_DURATION: 141 S
MDC_IDC_EPISODE_DURATION: 183 S
MDC_IDC_EPISODE_DURATION: 194 S
MDC_IDC_EPISODE_DURATION: 230 S
MDC_IDC_EPISODE_DURATION: 231 S
MDC_IDC_EPISODE_DURATION: 234 S
MDC_IDC_EPISODE_DURATION: 249 S
MDC_IDC_EPISODE_DURATION: 250 S
MDC_IDC_EPISODE_DURATION: 264 S
MDC_IDC_EPISODE_DURATION: 27 S
MDC_IDC_EPISODE_DURATION: 31 S
MDC_IDC_EPISODE_DURATION: 357 S
MDC_IDC_EPISODE_DURATION: 374 S
MDC_IDC_EPISODE_DURATION: 41 S
MDC_IDC_EPISODE_DURATION: 42 S
MDC_IDC_EPISODE_DURATION: 427 S
MDC_IDC_EPISODE_DURATION: 48 S
MDC_IDC_EPISODE_DURATION: 492 S
MDC_IDC_EPISODE_DURATION: 629 S
MDC_IDC_EPISODE_DURATION: 63 S
MDC_IDC_EPISODE_DURATION: 63 S
MDC_IDC_EPISODE_DURATION: 8 S
MDC_IDC_EPISODE_DURATION: 8 S
MDC_IDC_EPISODE_DURATION: 88 S
MDC_IDC_EPISODE_DURATION: 93 S
MDC_IDC_EPISODE_DURATION: 98 S
MDC_IDC_EPISODE_ID: 1
MDC_IDC_EPISODE_ID: 10
MDC_IDC_EPISODE_ID: 11
MDC_IDC_EPISODE_ID: 12
MDC_IDC_EPISODE_ID: 13
MDC_IDC_EPISODE_ID: 14
MDC_IDC_EPISODE_ID: 15
MDC_IDC_EPISODE_ID: 16
MDC_IDC_EPISODE_ID: 17
MDC_IDC_EPISODE_ID: 18
MDC_IDC_EPISODE_ID: 19
MDC_IDC_EPISODE_ID: 2
MDC_IDC_EPISODE_ID: 20
MDC_IDC_EPISODE_ID: 21
MDC_IDC_EPISODE_ID: 22
MDC_IDC_EPISODE_ID: 23
MDC_IDC_EPISODE_ID: 24
MDC_IDC_EPISODE_ID: 25
MDC_IDC_EPISODE_ID: 26
MDC_IDC_EPISODE_ID: 27
MDC_IDC_EPISODE_ID: 28
MDC_IDC_EPISODE_ID: 29
MDC_IDC_EPISODE_ID: 3
MDC_IDC_EPISODE_ID: 4
MDC_IDC_EPISODE_ID: 5
MDC_IDC_EPISODE_ID: 6
MDC_IDC_EPISODE_ID: 7
MDC_IDC_EPISODE_ID: 8
MDC_IDC_EPISODE_ID: 9
MDC_IDC_EPISODE_THERAPY_RESULT: NORMAL
MDC_IDC_EPISODE_TYPE: NORMAL
MDC_IDC_LEAD_IMPLANT_DT: NORMAL
MDC_IDC_LEAD_IMPLANT_DT: NORMAL
MDC_IDC_LEAD_LOCATION: NORMAL
MDC_IDC_LEAD_LOCATION: NORMAL
MDC_IDC_LEAD_LOCATION_DETAIL_1: NORMAL
MDC_IDC_LEAD_LOCATION_DETAIL_1: NORMAL
MDC_IDC_LEAD_MFG: NORMAL
MDC_IDC_LEAD_MFG: NORMAL
MDC_IDC_LEAD_MODEL: NORMAL
MDC_IDC_LEAD_MODEL: NORMAL
MDC_IDC_LEAD_POLARITY_TYPE: NORMAL
MDC_IDC_LEAD_POLARITY_TYPE: NORMAL
MDC_IDC_LEAD_SERIAL: NORMAL
MDC_IDC_LEAD_SERIAL: NORMAL
MDC_IDC_MSMT_BATTERY_DTM: NORMAL
MDC_IDC_MSMT_BATTERY_REMAINING_LONGEVITY: 128 MO
MDC_IDC_MSMT_BATTERY_RRT_TRIGGER: NORMAL
MDC_IDC_MSMT_BATTERY_VOLTAGE: 3.11 V
MDC_IDC_MSMT_CAP_CHARGE_ENERGY: 40 J
MDC_IDC_MSMT_CAP_CHARGE_TIME: 0 S
MDC_IDC_MSMT_CAP_CHARGE_TYPE: NORMAL
MDC_IDC_MSMT_LEADCHNL_RA_IMPEDANCE_VALUE: 323 OHM
MDC_IDC_MSMT_LEADCHNL_RA_IMPEDANCE_VALUE: 342 OHM
MDC_IDC_MSMT_LEADCHNL_RA_PACING_THRESHOLD_AMPLITUDE: 1 V
MDC_IDC_MSMT_LEADCHNL_RA_PACING_THRESHOLD_AMPLITUDE: 1 V
MDC_IDC_MSMT_LEADCHNL_RA_PACING_THRESHOLD_PULSEWIDTH: 0.4 MS
MDC_IDC_MSMT_LEADCHNL_RA_PACING_THRESHOLD_PULSEWIDTH: 0.4 MS
MDC_IDC_MSMT_LEADCHNL_RA_SENSING_INTR_AMPL: 0.1 MV
MDC_IDC_MSMT_LEADCHNL_RA_SENSING_INTR_AMPL: 0.1 MV
MDC_IDC_MSMT_LEADCHNL_RV_IMPEDANCE_VALUE: 266 OHM
MDC_IDC_MSMT_LEADCHNL_RV_IMPEDANCE_VALUE: 342 OHM
MDC_IDC_MSMT_LEADCHNL_RV_IMPEDANCE_VALUE: 361 OHM
MDC_IDC_MSMT_LEADCHNL_RV_PACING_THRESHOLD_AMPLITUDE: 0.62 V
MDC_IDC_MSMT_LEADCHNL_RV_PACING_THRESHOLD_AMPLITUDE: 0.75 V
MDC_IDC_MSMT_LEADCHNL_RV_PACING_THRESHOLD_AMPLITUDE: 0.75 V
MDC_IDC_MSMT_LEADCHNL_RV_PACING_THRESHOLD_PULSEWIDTH: 0.4 MS
MDC_IDC_MSMT_LEADCHNL_RV_SENSING_INTR_AMPL: 10.4 MV
MDC_IDC_MSMT_LEADCHNL_RV_SENSING_INTR_AMPL: 10.5 MV
MDC_IDC_PG_IMPLANT_DTM: NORMAL
MDC_IDC_PG_MFG: NORMAL
MDC_IDC_PG_MODEL: NORMAL
MDC_IDC_PG_SERIAL: NORMAL
MDC_IDC_PG_TYPE: NORMAL
MDC_IDC_SESS_CLINIC_NAME: NORMAL
MDC_IDC_SESS_DTM: NORMAL
MDC_IDC_SESS_TYPE: NORMAL
MDC_IDC_SET_BRADY_AT_MODE_SWITCH_RATE: 171 {BEATS}/MIN
MDC_IDC_SET_BRADY_LOWRATE: 60 {BEATS}/MIN
MDC_IDC_SET_BRADY_MAX_SENSOR_RATE: 120 {BEATS}/MIN
MDC_IDC_SET_BRADY_MAX_TRACKING_RATE: 130 {BEATS}/MIN
MDC_IDC_SET_BRADY_MODE: NORMAL
MDC_IDC_SET_BRADY_PAV_DELAY_LOW: 180 MS
MDC_IDC_SET_BRADY_SAV_DELAY_LOW: 150 MS
MDC_IDC_SET_LEADCHNL_RA_PACING_AMPLITUDE: NORMAL
MDC_IDC_SET_LEADCHNL_RA_PACING_ANODE_ELECTRODE_1: NORMAL
MDC_IDC_SET_LEADCHNL_RA_PACING_ANODE_LOCATION_1: NORMAL
MDC_IDC_SET_LEADCHNL_RA_PACING_CAPTURE_MODE: NORMAL
MDC_IDC_SET_LEADCHNL_RA_PACING_CATHODE_ELECTRODE_1: NORMAL
MDC_IDC_SET_LEADCHNL_RA_PACING_CATHODE_LOCATION_1: NORMAL
MDC_IDC_SET_LEADCHNL_RA_PACING_POLARITY: NORMAL
MDC_IDC_SET_LEADCHNL_RA_PACING_PULSEWIDTH: 0.4 MS
MDC_IDC_SET_LEADCHNL_RA_SENSING_ANODE_ELECTRODE_1: NORMAL
MDC_IDC_SET_LEADCHNL_RA_SENSING_ANODE_LOCATION_1: NORMAL
MDC_IDC_SET_LEADCHNL_RA_SENSING_CATHODE_ELECTRODE_1: NORMAL
MDC_IDC_SET_LEADCHNL_RA_SENSING_CATHODE_LOCATION_1: NORMAL
MDC_IDC_SET_LEADCHNL_RA_SENSING_POLARITY: NORMAL
MDC_IDC_SET_LEADCHNL_RA_SENSING_SENSITIVITY: 0.15 MV
MDC_IDC_SET_LEADCHNL_RV_PACING_AMPLITUDE: NORMAL
MDC_IDC_SET_LEADCHNL_RV_PACING_ANODE_ELECTRODE_1: NORMAL
MDC_IDC_SET_LEADCHNL_RV_PACING_ANODE_LOCATION_1: NORMAL
MDC_IDC_SET_LEADCHNL_RV_PACING_CAPTURE_MODE: NORMAL
MDC_IDC_SET_LEADCHNL_RV_PACING_CATHODE_ELECTRODE_1: NORMAL
MDC_IDC_SET_LEADCHNL_RV_PACING_CATHODE_LOCATION_1: NORMAL
MDC_IDC_SET_LEADCHNL_RV_PACING_POLARITY: NORMAL
MDC_IDC_SET_LEADCHNL_RV_PACING_PULSEWIDTH: 0.4 MS
MDC_IDC_SET_LEADCHNL_RV_SENSING_ANODE_ELECTRODE_1: NORMAL
MDC_IDC_SET_LEADCHNL_RV_SENSING_ANODE_LOCATION_1: NORMAL
MDC_IDC_SET_LEADCHNL_RV_SENSING_CATHODE_ELECTRODE_1: NORMAL
MDC_IDC_SET_LEADCHNL_RV_SENSING_CATHODE_LOCATION_1: NORMAL
MDC_IDC_SET_LEADCHNL_RV_SENSING_POLARITY: NORMAL
MDC_IDC_SET_LEADCHNL_RV_SENSING_SENSITIVITY: 0.45 MV
MDC_IDC_SET_ZONE_DETECTION_BEATS_DENOMINATOR: 40 {BEATS}
MDC_IDC_SET_ZONE_DETECTION_BEATS_NUMERATOR: 30 {BEATS}
MDC_IDC_SET_ZONE_DETECTION_INTERVAL: 200 MS
MDC_IDC_SET_ZONE_DETECTION_INTERVAL: 240 MS
MDC_IDC_SET_ZONE_DETECTION_INTERVAL: 320 MS
MDC_IDC_SET_ZONE_DETECTION_INTERVAL: 350 MS
MDC_IDC_SET_ZONE_DETECTION_INTERVAL: 360 MS
MDC_IDC_SET_ZONE_DETECTION_INTERVAL: 360 MS
MDC_IDC_SET_ZONE_TYPE: NORMAL
MDC_IDC_STAT_AT_BURDEN_PERCENT: 0.78 %
MDC_IDC_STAT_AT_DTM_END: NORMAL
MDC_IDC_STAT_AT_DTM_START: NORMAL
MDC_IDC_STAT_BRADY_AP_VP_PERCENT: 3.86 %
MDC_IDC_STAT_BRADY_AP_VS_PERCENT: 91.94 %
MDC_IDC_STAT_BRADY_AS_VP_PERCENT: 0.61 %
MDC_IDC_STAT_BRADY_AS_VS_PERCENT: 3.59 %
MDC_IDC_STAT_BRADY_DTM_END: NORMAL
MDC_IDC_STAT_BRADY_DTM_START: NORMAL
MDC_IDC_STAT_BRADY_RA_PERCENT_PACED: 95.06 %
MDC_IDC_STAT_BRADY_RV_PERCENT_PACED: 4.68 %
MDC_IDC_STAT_CRT_DTM_END: NORMAL
MDC_IDC_STAT_CRT_DTM_START: NORMAL
MDC_IDC_STAT_EPISODE_RECENT_COUNT: 0
MDC_IDC_STAT_EPISODE_RECENT_COUNT: 11
MDC_IDC_STAT_EPISODE_RECENT_COUNT: 18
MDC_IDC_STAT_EPISODE_RECENT_COUNT_DTM_END: NORMAL
MDC_IDC_STAT_EPISODE_RECENT_COUNT_DTM_START: NORMAL
MDC_IDC_STAT_EPISODE_TOTAL_COUNT: 0
MDC_IDC_STAT_EPISODE_TOTAL_COUNT: 11
MDC_IDC_STAT_EPISODE_TOTAL_COUNT: 18
MDC_IDC_STAT_EPISODE_TOTAL_COUNT_DTM_END: NORMAL
MDC_IDC_STAT_EPISODE_TOTAL_COUNT_DTM_START: NORMAL
MDC_IDC_STAT_EPISODE_TYPE: NORMAL
MDC_IDC_STAT_TACHYTHERAPY_ATP_DELIVERED_RECENT: 0
MDC_IDC_STAT_TACHYTHERAPY_ATP_DELIVERED_TOTAL: 0
MDC_IDC_STAT_TACHYTHERAPY_RECENT_DTM_END: NORMAL
MDC_IDC_STAT_TACHYTHERAPY_RECENT_DTM_START: NORMAL
MDC_IDC_STAT_TACHYTHERAPY_SHOCKS_ABORTED_RECENT: 0
MDC_IDC_STAT_TACHYTHERAPY_SHOCKS_ABORTED_TOTAL: 0
MDC_IDC_STAT_TACHYTHERAPY_SHOCKS_DELIVERED_RECENT: 0
MDC_IDC_STAT_TACHYTHERAPY_SHOCKS_DELIVERED_TOTAL: 0
MDC_IDC_STAT_TACHYTHERAPY_TOTAL_DTM_END: NORMAL
MDC_IDC_STAT_TACHYTHERAPY_TOTAL_DTM_START: NORMAL

## 2022-11-15 ENCOUNTER — DOCUMENTATION ONLY (OUTPATIENT)
Dept: ANTICOAGULATION | Facility: CLINIC | Age: 77
End: 2022-11-15

## 2022-11-15 DIAGNOSIS — I48.19 PERSISTENT ATRIAL FIBRILLATION (H): Primary | ICD-10-CM

## 2022-11-15 LAB — INR HOME MONITORING: 2.6 (ref 2–3)

## 2022-11-15 NOTE — PROGRESS NOTES
ANTICOAGULATION  MANAGEMENT-Home Monitor Managed by Exception    Buck Sinclair 77 year old male is on warfarin with therapeutic INR result. (Goal INR 2.0-3.0)    Recent labs: (last 7 days)     11/15/22  0000   INR 2.6       Previous INR was Therapeutic  Medication, diet, health changes since last INR:chart reviewed; none identified  Contacted within the last 12 weeks by phone on 10/4/22      LUCAS Jane was NOT contacted regarding therapeutic result today per home monitoring policy manage by exception agreement.   Current warfarin dose is to be continued:     Summary  As of 11/15/2022    Full warfarin instructions:  5 mg every Mon, Thu; 2.5 mg all other days; Starting 11/15/2022   Next INR check:  11/22/2022               Leana Nayak RN  Anticoagulation Clinic  11/15/2022    _______________________________________________________________________     Anticoagulation Episode Summary     Current INR goal:  2.0-3.0   TTR:  81.6 % (1 y)   Target end date:  Indefinite   Send INR reminders to:  [facility]    Indications    Persistent Atrial Fibrillation [I48.91]  Persistent atrial fibrillation (H) [I48.19]           Comments:  Home monitor ( [employer] )managed by exception         Anticoagulation Care Providers     Provider Role Specialty Phone number    Erica Hansen APRN CNP Referring Cardiovascular Disease [phone]    Domo Garrett MD Referring Cardiovascular Disease [phone]    Everett Renee MD  Cardiovascular Disease [phone]             room air

## 2022-11-16 ENCOUNTER — OFFICE VISIT (OUTPATIENT)
Dept: PULMONOLOGY | Facility: OTHER | Age: 77
End: 2022-11-16
Payer: OTHER MISCELLANEOUS

## 2022-11-16 VITALS
DIASTOLIC BLOOD PRESSURE: 60 MMHG | BODY MASS INDEX: 33.99 KG/M2 | OXYGEN SATURATION: 93 % | WEIGHT: 271.9 LBS | SYSTOLIC BLOOD PRESSURE: 112 MMHG | HEART RATE: 73 BPM

## 2022-11-16 DIAGNOSIS — J44.9 COPD, GROUP B, BY GOLD 2017 CLASSIFICATION (H): ICD-10-CM

## 2022-11-16 DIAGNOSIS — I50.9 HEART FAILURE, UNSPECIFIED HF CHRONICITY, UNSPECIFIED HEART FAILURE TYPE (H): ICD-10-CM

## 2022-11-16 DIAGNOSIS — R06.09 DOE (DYSPNEA ON EXERTION): Primary | ICD-10-CM

## 2022-11-16 DIAGNOSIS — J96.21 ACUTE ON CHRONIC RESPIRATORY FAILURE WITH HYPOXEMIA (H): ICD-10-CM

## 2022-11-16 DIAGNOSIS — I27.21 PAH (PULMONARY ARTERY HYPERTENSION) (H): ICD-10-CM

## 2022-11-16 PROCEDURE — 99214 OFFICE O/P EST MOD 30 MIN: CPT | Performed by: NURSE PRACTITIONER

## 2022-11-22 ENCOUNTER — DOCUMENTATION ONLY (OUTPATIENT)
Dept: ANTICOAGULATION | Facility: CLINIC | Age: 77
End: 2022-11-22

## 2022-11-22 DIAGNOSIS — I50.32 CHRONIC HEART FAILURE WITH PRESERVED EJECTION FRACTION (H): ICD-10-CM

## 2022-11-22 DIAGNOSIS — I42.9 CARDIOMYOPATHY (H): Primary | ICD-10-CM

## 2022-11-22 LAB — INR HOME MONITORING: 2.7 (ref 2–3)

## 2022-11-22 NOTE — PROGRESS NOTES
ANTICOAGULATION  MANAGEMENT-Home Monitor Managed by Exception    Buck JONES Sinclair 77 year old male is on warfarin with therapeutic INR result. (Goal INR 2.0-3.0)    Recent labs: (last 7 days)     11/22/22  0000   INR 2.7       Previous INR was Therapeutic  Medication, diet, health changes since last INR:chart reviewed; none identified  Contacted within the last 12 weeks by phone on  10/4/22      LUCAS     Buck was NOT contacted regarding therapeutic result today per home monitoring policy manage by exception agreement.   Current warfarin dose is to be continued:     Summary  As of 11/22/2022    Full warfarin instructions:  5 mg every Mon, Thu; 2.5 mg all other days; Starting 11/22/2022   Next INR check:  11/29/2022               Tran Crews RN  Anticoagulation Clinic  11/22/2022    _______________________________________________________________________     Anticoagulation Episode Summary     Current INR goal:  2.0-3.0   TTR:  81.6 % (1 y)   Target end date:  Indefinite   Send INR reminders to:  [facility]    Indications    Persistent Atrial Fibrillation [I48.91]  Persistent atrial fibrillation (H) [I48.19]           Comments:  Home monitor ( [employer] )managed by exception         Anticoagulation Care Providers     Provider Role Specialty Phone number    Erica Hansen APRN CNP Referring Cardiovascular Disease [phone]    Domo Garrett MD Referring Cardiovascular Disease [phone]    Everett Renee MD  Cardiovascular Disease [phone]

## 2022-11-25 ENCOUNTER — TELEPHONE (OUTPATIENT)
Dept: CARDIOLOGY | Facility: CLINIC | Age: 77
End: 2022-11-25

## 2022-11-25 NOTE — TELEPHONE ENCOUNTER
[facility] in [place] requesting PA on Warfarin  Pt plan does not cover medication.  Rx # [id number]  Please call plan [phone].  Pt ID # [id number]

## 2022-11-29 ENCOUNTER — DOCUMENTATION ONLY (OUTPATIENT)
Dept: ANTICOAGULATION | Facility: CLINIC | Age: 77
End: 2022-11-29

## 2022-11-29 ENCOUNTER — TELEPHONE (OUTPATIENT)
Dept: CARDIOLOGY | Facility: CLINIC | Age: 77
End: 2022-11-29

## 2022-11-29 DIAGNOSIS — I48.19 PERSISTENT ATRIAL FIBRILLATION (H): Primary | ICD-10-CM

## 2022-11-29 LAB — INR HOME MONITORING: 3 (ref 2–3)

## 2022-11-29 NOTE — TELEPHONE ENCOUNTER
Prior Authorization Not Needed per Insurance    Medication: warfarin ANTICOAGULANT (COUMADIN) 2.5 MG tablet  Insurance Company: [employer] ([facility]) - Phone [phone] Fax [phone]  Expected CoPay:      Pharmacy Filling the Rx: [id number] [place], [place] - [address] AT [facility] [address]  Pharmacy Notified: Yes  Patient Notified: Yes    Pharmacy needed to bill [employer] instead of [employer] ([employer] is a work comp plan). No further PA needed.

## 2022-11-29 NOTE — TELEPHONE ENCOUNTER
Pulmonary Hypertension Clinic  11/29/22    Pt was planned to see me in clinic today for PH follow up. He has a complex cardiac history and follows closely with Dr. Garrett in [place], most recently just a few weeks ago, and sounded to be well compensated at that time. Upon further chart review, he has group 3 PH in the setting of his COPD and the purpose of today's visit was to repeat an echocardiogram and consider him for the PERFECT trial (Tyvaso in PH-COPD) though ultimately this trial has closed as there was found to be no benefit.    He had an echocardiogram done in August, which showed preserved LVEF 55-60%. He does have moderate TI and RVSP was estimated at 73mmHg with moderate RV dysfunction. However, there is little to offer in regard to PH therapy in the setting of significant COPD.    I called and spoke with patient who was very much agreeable to cancel his appointments today, in light of the inclement weather. He has no new specific concerns for me today. I encouraged him to follow up with Dr. Garrett as he is, as well as pulmonary.     We are happy to see him back on an as needed basis with any new concerns.    Jolanta Donaldson PA-C  [facility]  Pager [phone]

## 2022-11-29 NOTE — PROGRESS NOTES
ANTICOAGULATION  MANAGEMENT-Home Monitor Managed by Exception    Buck JONES Sinclair 77 year old male is on warfarin with therapeutic INR result. (Goal INR 2.0-3.0)    Recent labs: (last 7 days)     11/29/22  0000   INR 3.0       Previous INR was Therapeutic  Medication, diet, health changes since last INR:chart reviewed; none identified  Contacted within the last 12 weeks by phone on 10/4/22      LUCAS Jane was NOT contacted regarding therapeutic result today per home monitoring policy manage by exception agreement.   Current warfarin dose is to be continued:     Summary  As of 11/29/2022    Full warfarin instructions:  5 mg every Mon, Thu; 2.5 mg all other days; Starting 11/29/2022   Next INR check:  12/6/2022               Leana Nayak RN  Anticoagulation Clinic  11/29/2022    _______________________________________________________________________     Anticoagulation Episode Summary     Current INR goal:  2.0-3.0   TTR:  81.6 % (1 y)   Target end date:  Indefinite   Send INR reminders to:  [facility]    Indications    Persistent Atrial Fibrillation [I48.91]  Persistent atrial fibrillation (H) [I48.19]           Comments:  Home monitor ( [employer] )managed by exception         Anticoagulation Care Providers     Provider Role Specialty Phone number    Erica Hansen APRN CNP Referring Cardiovascular Disease [phone]    Domo Garrett MD Referring Cardiovascular Disease [phone]    Everett Renee MD  Cardiovascular Disease [phone]

## 2022-12-06 ENCOUNTER — DOCUMENTATION ONLY (OUTPATIENT)
Dept: ANTICOAGULATION | Facility: CLINIC | Age: 77
End: 2022-12-06

## 2022-12-06 DIAGNOSIS — I48.19 PERSISTENT ATRIAL FIBRILLATION (H): Primary | ICD-10-CM

## 2022-12-06 LAB — INR HOME MONITORING: 2.8 (ref 2–3)

## 2022-12-06 NOTE — PROGRESS NOTES
ANTICOAGULATION  MANAGEMENT-Home Monitor Managed by Exception    Buck Sinclair 77 year old male is on warfarin with therapeutic INR result. (Goal INR 2.0-3.0)    Recent labs: (last 7 days)     12/06/22  0000   INR 2.8       Previous INR was Therapeutic  Medication, diet, health changes since last INR:chart reviewed; none identified  Contacted within the last 12 weeks by phone on 10/4/22      LUCAS Jnae was NOT contacted regarding therapeutic result today per home monitoring policy manage by exception agreement.   Current warfarin dose is to be continued:     Summary  As of 12/6/2022    Full warfarin instructions:  5 mg every Mon, Thu; 2.5 mg all other days; Starting 12/6/2022   Next INR check:  12/13/2022               Leana Nayak RN  Anticoagulation Clinic  12/6/2022    _______________________________________________________________________     Anticoagulation Episode Summary     Current INR goal:  2.0-3.0   TTR:  81.6 % (1 y)   Target end date:  Indefinite   Send INR reminders to:  [facility]    Indications    Persistent Atrial Fibrillation [I48.91]  Persistent atrial fibrillation (H) [I48.19]           Comments:  Home monitor ( [employer] )managed by exception         Anticoagulation Care Providers     Provider Role Specialty Phone number    Erica Hansen APRN CNP Referring Cardiovascular Disease [phone]    Domo Garrett MD Referring Cardiovascular Disease [phone]    Everett Renee MD  Cardiovascular Disease [phone]

## 2022-12-13 ENCOUNTER — DOCUMENTATION ONLY (OUTPATIENT)
Dept: ANTICOAGULATION | Facility: CLINIC | Age: 77
End: 2022-12-13

## 2022-12-13 DIAGNOSIS — I48.19 PERSISTENT ATRIAL FIBRILLATION (H): Primary | ICD-10-CM

## 2022-12-13 LAB — INR HOME MONITORING: 3 (ref 2–3)

## 2022-12-13 NOTE — PROGRESS NOTES
ANTICOAGULATION  MANAGEMENT-Home Monitor Managed by Exception    Buck Sinclair 77 year old male is on warfarin with therapeutic INR result. (Goal INR 2.0-3.0)    Recent labs: (last 7 days)     12/13/22  0000   INR 3.0       Previous INR was Therapeutic  Medication, diet, health changes since last INR:chart reviewed; none identified  Contacted within the last 12 weeks by phone on 10/4/22      LUCAS Jane was NOT contacted regarding therapeutic result today per home monitoring policy manage by exception agreement.   Current warfarin dose is to be continued:     Summary  As of 12/13/2022    Full warfarin instructions:  5 mg every Mon, Thu; 2.5 mg all other days; Starting 12/13/2022   Next INR check:  12/20/2022               Leana Nayak RN  Anticoagulation Clinic  12/13/2022    _______________________________________________________________________     Anticoagulation Episode Summary     Current INR goal:  2.0-3.0   TTR:  81.5 % (1 y)   Target end date:  Indefinite   Send INR reminders to:  [facility]    Indications    Persistent Atrial Fibrillation [I48.91]  Persistent atrial fibrillation (H) [I48.19]           Comments:  Home monitor ( [employer] )managed by exception         Anticoagulation Care Providers     Provider Role Specialty Phone number    Erica Hansen APRN CNP Referring Cardiovascular Disease [phone]    Domo Garrett MD Referring Cardiovascular Disease [phone]    Everett Renee MD  Cardiovascular Disease [phone]

## 2022-12-16 ENCOUNTER — TELEPHONE (OUTPATIENT)
Dept: FAMILY MEDICINE | Facility: CLINIC | Age: 77
End: 2022-12-16

## 2022-12-16 ENCOUNTER — TELEPHONE (OUTPATIENT)
Dept: PULMONOLOGY | Facility: CLINIC | Age: 77
End: 2022-12-16

## 2022-12-16 DIAGNOSIS — I48.19 PERSISTENT ATRIAL FIBRILLATION (H): Primary | Chronic | ICD-10-CM

## 2022-12-16 NOTE — TELEPHONE ENCOUNTER
Central Prior Authorization Team   Phone: [phone]      Prior Authorization Not Needed per Insurance    Medication: furosemide (LASIX) 80 MG tablet - PA NOT NEEDED  Insurance Company: [employer] Part D - Phone [phone] Fax [phone]  Expected CoPay:      Pharmacy Filling the Rx: [id number] [place], [place] - [facility] [address] AT [facility] [address]  Pharmacy Notified: Yes - verified pharmacy received paid claim  Patient Notified: Yes (pharmacy will notify patient when ready)

## 2022-12-16 NOTE — TELEPHONE ENCOUNTER
Reason for Call:  Other call back    Detailed comments: pt wife called with some questions an concerned for her  inr wife would like a call back     Phone Number Patient can be reached at: Cell number on file:    Telephone Information:   Mobile [phone]       Best Time: any    Can we leave a detailed message on this number? YES    Call taken on 12/16/2022 at 2:31 PM by Tata Cruz

## 2022-12-16 NOTE — TELEPHONE ENCOUNTER
Spoke with Neela who states that Buck ran out of test strips.  He placed an order for them 3 weeks ago but have not come yet.  When they tried to call us [facility] today, [facility] had no record of the order.    Called [facility] who states they are still waiting for verification from third party insurance company to OK the order, then the strips will be sent out. Requested strips be rushed once they are approved.     Called back Neela and advised that she should call the third-party insurance company to help push the order through.  Given that Buck's INRs have been in range for the last couple months it is okay if they do not test for a couple weeks.  Advised Neela to call the INR clinic if Buck has any significant diet changes or is started on any new medications.  She verbalizes understanding and is agreement with the plan.  We will set out ACC calendar for 2 weeks.

## 2022-12-16 NOTE — TELEPHONE ENCOUNTER
Prior Authorization Retail Medication Request    Medication/Dose: Furosemide 80mg tabs  ICD code (if different than what is on RX):  I50.9  Previously Tried and Failed:    Rationale:      Insurance Name: [employer] MEDIC[employer] ADVANTAGE  Insurance ID: [id number]      Pharmacy Information (if different than what is on RX)  Name:  [facility] pharmacy,  Phone:      Patient has been on this medication since sept 2021 for his heart failure.  Prescribed by cardiology.  Need for heart failure.

## 2022-12-20 NOTE — PROGRESS NOTES
Patient will check INR 12/26 or 12/2/7 either by Lab or home monitoring if testing supplies received. Anticoag tracking updated to reflect next check date.

## 2022-12-30 ENCOUNTER — DOCUMENTATION ONLY (OUTPATIENT)
Dept: ANTICOAGULATION | Facility: CLINIC | Age: 77
End: 2022-12-30

## 2022-12-30 DIAGNOSIS — I48.19 PERSISTENT ATRIAL FIBRILLATION (H): Primary | Chronic | ICD-10-CM

## 2022-12-30 LAB — INR HOME MONITORING: 3 (ref 2–3)

## 2023-01-01 ENCOUNTER — ANCILLARY PROCEDURE (OUTPATIENT)
Dept: CARDIOLOGY | Facility: CLINIC | Age: 78
End: 2023-01-01
Attending: INTERNAL MEDICINE
Payer: COMMERCIAL

## 2023-01-01 ENCOUNTER — OFFICE VISIT (OUTPATIENT)
Dept: CARDIOLOGY | Facility: CLINIC | Age: 78
End: 2023-01-01
Attending: INTERNAL MEDICINE
Payer: OTHER MISCELLANEOUS

## 2023-01-01 ENCOUNTER — DOCUMENTATION ONLY (OUTPATIENT)
Dept: ANTICOAGULATION | Facility: CLINIC | Age: 78
End: 2023-01-01
Payer: COMMERCIAL

## 2023-01-01 ENCOUNTER — OFFICE VISIT (OUTPATIENT)
Dept: CARDIOLOGY | Facility: CLINIC | Age: 78
End: 2023-01-01
Attending: NURSE PRACTITIONER
Payer: OTHER MISCELLANEOUS

## 2023-01-01 ENCOUNTER — APPOINTMENT (OUTPATIENT)
Dept: PHYSICAL THERAPY | Facility: HOSPITAL | Age: 78
DRG: 291 | End: 2023-01-01
Payer: OTHER MISCELLANEOUS

## 2023-01-01 ENCOUNTER — ANTICOAGULATION THERAPY VISIT (OUTPATIENT)
Dept: ANTICOAGULATION | Facility: CLINIC | Age: 78
End: 2023-01-01
Payer: COMMERCIAL

## 2023-01-01 ENCOUNTER — LAB REQUISITION (OUTPATIENT)
Dept: LAB | Facility: CLINIC | Age: 78
End: 2023-01-01
Payer: COMMERCIAL

## 2023-01-01 ENCOUNTER — APPOINTMENT (OUTPATIENT)
Dept: OCCUPATIONAL THERAPY | Facility: HOSPITAL | Age: 78
DRG: 291 | End: 2023-01-01
Attending: INTERNAL MEDICINE
Payer: OTHER MISCELLANEOUS

## 2023-01-01 ENCOUNTER — APPOINTMENT (OUTPATIENT)
Dept: OCCUPATIONAL THERAPY | Facility: HOSPITAL | Age: 78
DRG: 291 | End: 2023-01-01
Payer: OTHER MISCELLANEOUS

## 2023-01-01 ENCOUNTER — TRANSFERRED RECORDS (OUTPATIENT)
Dept: HEALTH INFORMATION MANAGEMENT | Facility: CLINIC | Age: 78
End: 2023-01-01
Payer: COMMERCIAL

## 2023-01-01 ENCOUNTER — TELEPHONE (OUTPATIENT)
Dept: CARDIOLOGY | Facility: CLINIC | Age: 78
End: 2023-01-01
Payer: COMMERCIAL

## 2023-01-01 ENCOUNTER — DOCUMENTATION ONLY (OUTPATIENT)
Dept: CARDIOLOGY | Facility: CLINIC | Age: 78
End: 2023-01-01
Payer: COMMERCIAL

## 2023-01-01 ENCOUNTER — APPOINTMENT (OUTPATIENT)
Dept: NUCLEAR MEDICINE | Facility: HOSPITAL | Age: 78
DRG: 291 | End: 2023-01-01
Attending: INTERNAL MEDICINE
Payer: OTHER MISCELLANEOUS

## 2023-01-01 ENCOUNTER — TRANSFERRED RECORDS (OUTPATIENT)
Dept: HEALTH INFORMATION MANAGEMENT | Facility: CLINIC | Age: 78
End: 2023-01-01

## 2023-01-01 ENCOUNTER — APPOINTMENT (OUTPATIENT)
Dept: RADIOLOGY | Facility: HOSPITAL | Age: 78
DRG: 291 | End: 2023-01-01
Attending: INTERNAL MEDICINE
Payer: OTHER MISCELLANEOUS

## 2023-01-01 ENCOUNTER — HOSPITAL ENCOUNTER (INPATIENT)
Facility: HOSPITAL | Age: 78
LOS: 14 days | Discharge: HOME HEALTH | DRG: 291 | End: 2023-06-01
Attending: EMERGENCY MEDICINE | Admitting: INTERNAL MEDICINE
Payer: OTHER MISCELLANEOUS

## 2023-01-01 ENCOUNTER — TELEPHONE (OUTPATIENT)
Dept: CARDIOLOGY | Facility: CLINIC | Age: 78
End: 2023-01-01

## 2023-01-01 ENCOUNTER — APPOINTMENT (OUTPATIENT)
Dept: CARDIOLOGY | Facility: HOSPITAL | Age: 78
DRG: 291 | End: 2023-01-01
Attending: INTERNAL MEDICINE
Payer: OTHER MISCELLANEOUS

## 2023-01-01 ENCOUNTER — HEALTH MAINTENANCE LETTER (OUTPATIENT)
Age: 78
End: 2023-01-01

## 2023-01-01 ENCOUNTER — APPOINTMENT (OUTPATIENT)
Dept: CT IMAGING | Facility: HOSPITAL | Age: 78
DRG: 291 | End: 2023-01-01
Attending: INTERNAL MEDICINE
Payer: OTHER MISCELLANEOUS

## 2023-01-01 ENCOUNTER — ANTICOAGULATION THERAPY VISIT (OUTPATIENT)
Dept: ANTICOAGULATION | Facility: CLINIC | Age: 78
End: 2023-01-01

## 2023-01-01 ENCOUNTER — APPOINTMENT (OUTPATIENT)
Dept: RADIOLOGY | Facility: HOSPITAL | Age: 78
DRG: 291 | End: 2023-01-01
Attending: EMERGENCY MEDICINE
Payer: OTHER MISCELLANEOUS

## 2023-01-01 ENCOUNTER — TELEPHONE (OUTPATIENT)
Dept: ANTICOAGULATION | Facility: CLINIC | Age: 78
End: 2023-01-01
Payer: COMMERCIAL

## 2023-01-01 ENCOUNTER — TELEPHONE (OUTPATIENT)
Dept: PULMONOLOGY | Facility: CLINIC | Age: 78
End: 2023-01-01
Payer: COMMERCIAL

## 2023-01-01 ENCOUNTER — OFFICE VISIT (OUTPATIENT)
Dept: PULMONOLOGY | Facility: CLINIC | Age: 78
End: 2023-01-01
Payer: OTHER MISCELLANEOUS

## 2023-01-01 ENCOUNTER — APPOINTMENT (OUTPATIENT)
Dept: PHYSICAL THERAPY | Facility: HOSPITAL | Age: 78
DRG: 291 | End: 2023-01-01
Attending: INTERNAL MEDICINE
Payer: OTHER MISCELLANEOUS

## 2023-01-01 ENCOUNTER — DOCUMENTATION ONLY (OUTPATIENT)
Dept: ANTICOAGULATION | Facility: CLINIC | Age: 78
End: 2023-01-01

## 2023-01-01 VITALS
OXYGEN SATURATION: 95 % | HEART RATE: 82 BPM | DIASTOLIC BLOOD PRESSURE: 77 MMHG | SYSTOLIC BLOOD PRESSURE: 109 MMHG | BODY MASS INDEX: 31.46 KG/M2 | WEIGHT: 253 LBS | HEIGHT: 75 IN | RESPIRATION RATE: 18 BRPM

## 2023-01-01 VITALS
DIASTOLIC BLOOD PRESSURE: 72 MMHG | TEMPERATURE: 86 F | WEIGHT: 255.2 LBS | SYSTOLIC BLOOD PRESSURE: 116 MMHG | OXYGEN SATURATION: 91 % | BODY MASS INDEX: 31.9 KG/M2

## 2023-01-01 VITALS
RESPIRATION RATE: 22 BRPM | OXYGEN SATURATION: 96 % | TEMPERATURE: 97.3 F | HEART RATE: 71 BPM | BODY MASS INDEX: 30.18 KG/M2 | SYSTOLIC BLOOD PRESSURE: 103 MMHG | WEIGHT: 242.7 LBS | DIASTOLIC BLOOD PRESSURE: 70 MMHG | HEIGHT: 75 IN

## 2023-01-01 VITALS
OXYGEN SATURATION: 93 % | RESPIRATION RATE: 20 BRPM | BODY MASS INDEX: 31.9 KG/M2 | HEIGHT: 75 IN | HEART RATE: 88 BPM | SYSTOLIC BLOOD PRESSURE: 104 MMHG | DIASTOLIC BLOOD PRESSURE: 65 MMHG

## 2023-01-01 VITALS
HEART RATE: 70 BPM | RESPIRATION RATE: 20 BRPM | DIASTOLIC BLOOD PRESSURE: 70 MMHG | SYSTOLIC BLOOD PRESSURE: 108 MMHG | OXYGEN SATURATION: 97 %

## 2023-01-01 VITALS
RESPIRATION RATE: 16 BRPM | HEART RATE: 80 BPM | WEIGHT: 245.2 LBS | BODY MASS INDEX: 30.49 KG/M2 | DIASTOLIC BLOOD PRESSURE: 72 MMHG | SYSTOLIC BLOOD PRESSURE: 108 MMHG | HEIGHT: 75 IN

## 2023-01-01 VITALS
WEIGHT: 248 LBS | SYSTOLIC BLOOD PRESSURE: 110 MMHG | DIASTOLIC BLOOD PRESSURE: 72 MMHG | BODY MASS INDEX: 31 KG/M2 | HEART RATE: 80 BPM | OXYGEN SATURATION: 89 %

## 2023-01-01 VITALS
RESPIRATION RATE: 12 BRPM | SYSTOLIC BLOOD PRESSURE: 111 MMHG | WEIGHT: 254 LBS | DIASTOLIC BLOOD PRESSURE: 78 MMHG | BODY MASS INDEX: 31.75 KG/M2 | OXYGEN SATURATION: 94 % | HEART RATE: 80 BPM

## 2023-01-01 DIAGNOSIS — I48.0 PAROXYSMAL ATRIAL FIBRILLATION (H): Primary | ICD-10-CM

## 2023-01-01 DIAGNOSIS — I48.19 PERSISTENT ATRIAL FIBRILLATION (H): Primary | Chronic | ICD-10-CM

## 2023-01-01 DIAGNOSIS — N18.32 STAGE 3B CHRONIC KIDNEY DISEASE (H): ICD-10-CM

## 2023-01-01 DIAGNOSIS — I48.20 CHRONIC ATRIAL FIBRILLATION (H): ICD-10-CM

## 2023-01-01 DIAGNOSIS — I48.19 PERSISTENT ATRIAL FIBRILLATION (H): Primary | ICD-10-CM

## 2023-01-01 DIAGNOSIS — Z45.02 ICD (IMPLANTABLE CARDIOVERTER-DEFIBRILLATOR) BATTERY DEPLETION: ICD-10-CM

## 2023-01-01 DIAGNOSIS — R06.09 DOE (DYSPNEA ON EXERTION): ICD-10-CM

## 2023-01-01 DIAGNOSIS — I48.0 PAROXYSMAL ATRIAL FIBRILLATION (H): ICD-10-CM

## 2023-01-01 DIAGNOSIS — I25.84 CORONARY ARTERY DISEASE DUE TO CALCIFIED CORONARY LESION: Chronic | ICD-10-CM

## 2023-01-01 DIAGNOSIS — I49.5 SICK SINUS SYNDROME (H): ICD-10-CM

## 2023-01-01 DIAGNOSIS — I25.10 CORONARY ARTERY DISEASE DUE TO CALCIFIED CORONARY LESION: Chronic | ICD-10-CM

## 2023-01-01 DIAGNOSIS — I10 ESSENTIAL HYPERTENSION: Chronic | ICD-10-CM

## 2023-01-01 DIAGNOSIS — I27.20 PULMONARY HYPERTENSION, UNSPECIFIED (H): ICD-10-CM

## 2023-01-01 DIAGNOSIS — I48.21 PERMANENT ATRIAL FIBRILLATION (H): Primary | ICD-10-CM

## 2023-01-01 DIAGNOSIS — I48.19 PERSISTENT ATRIAL FIBRILLATION (H): ICD-10-CM

## 2023-01-01 DIAGNOSIS — I50.9 ACUTE DECOMPENSATED HEART FAILURE (H): ICD-10-CM

## 2023-01-01 DIAGNOSIS — I50.20 HEART FAILURE WITH REDUCED EJECTION FRACTION (H): Primary | ICD-10-CM

## 2023-01-01 DIAGNOSIS — I27.20 PULMONARY HYPERTENSION (H): ICD-10-CM

## 2023-01-01 DIAGNOSIS — I50.32 CHRONIC HEART FAILURE WITH PRESERVED EJECTION FRACTION (H): ICD-10-CM

## 2023-01-01 DIAGNOSIS — J96.21 ACUTE ON CHRONIC RESPIRATORY FAILURE WITH HYPOXIA (H): ICD-10-CM

## 2023-01-01 DIAGNOSIS — E11.9 TYPE 2 DIABETES MELLITUS WITHOUT COMPLICATIONS (H): ICD-10-CM

## 2023-01-01 DIAGNOSIS — J44.9 COPD, GROUP B, BY GOLD 2017 CLASSIFICATION (H): Primary | ICD-10-CM

## 2023-01-01 DIAGNOSIS — I42.9 CARDIOMYOPATHY (H): ICD-10-CM

## 2023-01-01 DIAGNOSIS — J96.21 ACUTE ON CHRONIC RESPIRATORY FAILURE WITH HYPOXEMIA (H): ICD-10-CM

## 2023-01-01 DIAGNOSIS — Z91.89 AT HIGH RISK FOR SKIN BREAKDOWN: ICD-10-CM

## 2023-01-01 DIAGNOSIS — Z95.810 ICD (IMPLANTABLE CARDIOVERTER-DEFIBRILLATOR) IN PLACE: ICD-10-CM

## 2023-01-01 DIAGNOSIS — I50.33 ACUTE ON CHRONIC DIASTOLIC CONGESTIVE HEART FAILURE (H): Primary | ICD-10-CM

## 2023-01-01 DIAGNOSIS — I27.21 PAH (PULMONARY ARTERY HYPERTENSION) (H): ICD-10-CM

## 2023-01-01 DIAGNOSIS — I50.9 ACUTE ON CHRONIC CONGESTIVE HEART FAILURE, UNSPECIFIED HEART FAILURE TYPE (H): ICD-10-CM

## 2023-01-01 DIAGNOSIS — I48.91 ATRIAL FIBRILLATION (H): Primary | Chronic | ICD-10-CM

## 2023-01-01 DIAGNOSIS — E78.5 HYPERLIPIDEMIA WITH TARGET LDL LESS THAN 70: Primary | ICD-10-CM

## 2023-01-01 DIAGNOSIS — I42.9 CARDIOMYOPATHY (H): Primary | ICD-10-CM

## 2023-01-01 DIAGNOSIS — N18.31 STAGE 3A CHRONIC KIDNEY DISEASE (H): ICD-10-CM

## 2023-01-01 DIAGNOSIS — Z95.810 ICD (IMPLANTABLE CARDIOVERTER-DEFIBRILLATOR), DUAL, IN SITU: ICD-10-CM

## 2023-01-01 DIAGNOSIS — I50.9 ACUTE DECOMPENSATED HEART FAILURE (H): Primary | ICD-10-CM

## 2023-01-01 DIAGNOSIS — I48.91 ATRIAL FIBRILLATION (H): ICD-10-CM

## 2023-01-01 LAB
ALBUMIN SERPL BCG-MCNC: 3.3 G/DL (ref 3.5–5.2)
ALBUMIN SERPL BCG-MCNC: 3.3 G/DL (ref 3.5–5.2)
ALBUMIN SERPL BCG-MCNC: 3.7 G/DL (ref 3.5–5.2)
ALP SERPL-CCNC: 56 U/L (ref 40–129)
ALP SERPL-CCNC: 69 U/L (ref 40–129)
ALT SERPL W P-5'-P-CCNC: 15 U/L (ref 10–50)
ALT SERPL W P-5'-P-CCNC: 15 U/L (ref 10–50)
ANION GAP SERPL CALCULATED.3IONS-SCNC: 10 MMOL/L (ref 7–15)
ANION GAP SERPL CALCULATED.3IONS-SCNC: 11 MMOL/L (ref 7–15)
ANION GAP SERPL CALCULATED.3IONS-SCNC: 12 MMOL/L (ref 7–15)
ANION GAP SERPL CALCULATED.3IONS-SCNC: 13 MMOL/L (ref 7–15)
ANION GAP SERPL CALCULATED.3IONS-SCNC: 14 MMOL/L (ref 7–15)
ANION GAP SERPL CALCULATED.3IONS-SCNC: 15 MMOL/L (ref 7–15)
ANION GAP SERPL CALCULATED.3IONS-SCNC: 18 MMOL/L (ref 7–15)
ANION GAP SERPL CALCULATED.3IONS-SCNC: 9 MMOL/L (ref 7–15)
APTT PPP: 33 SECONDS (ref 22–38)
AST SERPL W P-5'-P-CCNC: 24 U/L (ref 10–50)
AST SERPL W P-5'-P-CCNC: 24 U/L (ref 10–50)
ATRIAL RATE - MUSE: 72 BPM
ATRIAL RATE - MUSE: 75 BPM
BACTERIA BLD CULT: NO GROWTH
BACTERIA BLD CULT: NO GROWTH
BASE EXCESS BLDA CALC-SCNC: 0 MMOL/L
BASE EXCESS BLDV CALC-SCNC: 3.4 MMOL/L
BASOPHILS # BLD AUTO: 0 10E3/UL (ref 0–0.2)
BASOPHILS # BLD AUTO: 0 10E3/UL (ref 0–0.2)
BASOPHILS NFR BLD AUTO: 0 %
BASOPHILS NFR BLD AUTO: 0 %
BILIRUB DIRECT SERPL-MCNC: 0.34 MG/DL (ref 0–0.3)
BILIRUB SERPL-MCNC: 1 MG/DL
BILIRUB SERPL-MCNC: 1 MG/DL
BUN SERPL-MCNC: 25.8 MG/DL (ref 8–23)
BUN SERPL-MCNC: 29.4 MG/DL (ref 8–23)
BUN SERPL-MCNC: 34.5 MG/DL (ref 8–23)
BUN SERPL-MCNC: 35.4 MG/DL (ref 8–23)
BUN SERPL-MCNC: 38.2 MG/DL (ref 8–23)
BUN SERPL-MCNC: 42.7 MG/DL (ref 8–23)
BUN SERPL-MCNC: 46.1 MG/DL (ref 8–23)
BUN SERPL-MCNC: 47.1 MG/DL (ref 8–23)
BUN SERPL-MCNC: 48.2 MG/DL (ref 8–23)
BUN SERPL-MCNC: 49.6 MG/DL (ref 8–23)
BUN SERPL-MCNC: 51.7 MG/DL (ref 8–23)
BUN SERPL-MCNC: 51.9 MG/DL (ref 8–23)
BUN SERPL-MCNC: 52.4 MG/DL (ref 8–23)
BUN SERPL-MCNC: 53.9 MG/DL (ref 8–23)
BUN SERPL-MCNC: 55.7 MG/DL (ref 8–23)
BUN SERPL-MCNC: 59.5 MG/DL (ref 8–23)
BUN SERPL-MCNC: 60.9 MG/DL (ref 8–23)
BUN SERPL-MCNC: 65.6 MG/DL (ref 8–23)
C PNEUM DNA SPEC QL NAA+PROBE: NOT DETECTED
CALCIUM SERPL-MCNC: 8.3 MG/DL (ref 8.8–10.2)
CALCIUM SERPL-MCNC: 8.5 MG/DL (ref 8.8–10.2)
CALCIUM SERPL-MCNC: 8.6 MG/DL (ref 8.8–10.2)
CALCIUM SERPL-MCNC: 8.8 MG/DL (ref 8.8–10.2)
CALCIUM SERPL-MCNC: 8.9 MG/DL (ref 8.8–10.2)
CALCIUM SERPL-MCNC: 8.9 MG/DL (ref 8.8–10.2)
CALCIUM SERPL-MCNC: 9.1 MG/DL (ref 8.8–10.2)
CALCIUM SERPL-MCNC: 9.2 MG/DL (ref 8.8–10.2)
CALCIUM SERPL-MCNC: 9.5 MG/DL (ref 8.8–10.2)
CALCIUM SERPL-MCNC: 9.6 MG/DL (ref 8.8–10.2)
CALCIUM SERPL-MCNC: 9.6 MG/DL (ref 8.8–10.2)
CHLORIDE SERPL-SCNC: 100 MMOL/L (ref 98–107)
CHLORIDE SERPL-SCNC: 101 MMOL/L (ref 98–107)
CHLORIDE SERPL-SCNC: 101 MMOL/L (ref 98–107)
CHLORIDE SERPL-SCNC: 102 MMOL/L (ref 98–107)
CHLORIDE SERPL-SCNC: 102 MMOL/L (ref 98–107)
CHLORIDE SERPL-SCNC: 103 MMOL/L (ref 98–107)
CHLORIDE SERPL-SCNC: 103 MMOL/L (ref 98–107)
CHLORIDE SERPL-SCNC: 104 MMOL/L (ref 98–107)
CHLORIDE SERPL-SCNC: 105 MMOL/L (ref 98–107)
CHLORIDE SERPL-SCNC: 106 MMOL/L (ref 98–107)
CHLORIDE SERPL-SCNC: 98 MMOL/L (ref 98–107)
CHLORIDE SERPL-SCNC: 98 MMOL/L (ref 98–107)
CHLORIDE SERPL-SCNC: 99 MMOL/L (ref 98–107)
CHLORIDE SERPL-SCNC: 99 MMOL/L (ref 98–107)
COHGB MFR BLD: 99.7 % (ref 95–96)
CREAT SERPL-MCNC: 1.41 MG/DL (ref 0.67–1.17)
CREAT SERPL-MCNC: 1.44 MG/DL (ref 0.67–1.17)
CREAT SERPL-MCNC: 1.53 MG/DL (ref 0.67–1.17)
CREAT SERPL-MCNC: 1.54 MG/DL (ref 0.67–1.17)
CREAT SERPL-MCNC: 1.6 MG/DL (ref 0.67–1.17)
CREAT SERPL-MCNC: 1.61 MG/DL (ref 0.67–1.17)
CREAT SERPL-MCNC: 1.63 MG/DL (ref 0.67–1.17)
CREAT SERPL-MCNC: 1.63 MG/DL (ref 0.67–1.17)
CREAT SERPL-MCNC: 1.64 MG/DL (ref 0.67–1.17)
CREAT SERPL-MCNC: 1.64 MG/DL (ref 0.67–1.17)
CREAT SERPL-MCNC: 1.69 MG/DL (ref 0.67–1.17)
CREAT SERPL-MCNC: 1.75 MG/DL (ref 0.67–1.17)
CREAT SERPL-MCNC: 1.81 MG/DL (ref 0.67–1.17)
CREAT SERPL-MCNC: 1.81 MG/DL (ref 0.67–1.17)
CREAT SERPL-MCNC: 1.82 MG/DL (ref 0.67–1.17)
CREAT SERPL-MCNC: 1.83 MG/DL (ref 0.67–1.17)
CREAT SERPL-MCNC: 1.85 MG/DL (ref 0.67–1.17)
CREAT SERPL-MCNC: 1.88 MG/DL (ref 0.67–1.17)
CREAT SERPL-MCNC: 1.96 MG/DL (ref 0.67–1.17)
CREAT UR-MCNC: 61.5 MG/DL
D DIMER PPP FEU-MCNC: 0.33 UG/ML FEU (ref 0–0.5)
DEPRECATED HCO3 PLAS-SCNC: 22 MMOL/L (ref 22–29)
DEPRECATED HCO3 PLAS-SCNC: 24 MMOL/L (ref 22–29)
DEPRECATED HCO3 PLAS-SCNC: 25 MMOL/L (ref 22–29)
DEPRECATED HCO3 PLAS-SCNC: 28 MMOL/L (ref 22–29)
DEPRECATED HCO3 PLAS-SCNC: 28 MMOL/L (ref 22–29)
DEPRECATED HCO3 PLAS-SCNC: 29 MMOL/L (ref 22–29)
DEPRECATED HCO3 PLAS-SCNC: 30 MMOL/L (ref 22–29)
DEPRECATED HCO3 PLAS-SCNC: 31 MMOL/L (ref 22–29)
DEPRECATED HCO3 PLAS-SCNC: 31 MMOL/L (ref 22–29)
DEPRECATED HCO3 PLAS-SCNC: 32 MMOL/L (ref 22–29)
DEPRECATED HCO3 PLAS-SCNC: 34 MMOL/L (ref 22–29)
DEPRECATED HCO3 PLAS-SCNC: 34 MMOL/L (ref 22–29)
DIASTOLIC BLOOD PRESSURE - MUSE: NORMAL MMHG
DIASTOLIC BLOOD PRESSURE - MUSE: NORMAL MMHG
EGFRCR SERPLBLD CKD-EPI 2021: 43 ML/MIN/1.73M2
EOSINOPHIL # BLD AUTO: 0.1 10E3/UL (ref 0–0.7)
EOSINOPHIL # BLD AUTO: 0.3 10E3/UL (ref 0–0.7)
EOSINOPHIL NFR BLD AUTO: 1 %
EOSINOPHIL NFR BLD AUTO: 4 %
ERYTHROCYTE [DISTWIDTH] IN BLOOD BY AUTOMATED COUNT: 15.8 % (ref 10–15)
ERYTHROCYTE [DISTWIDTH] IN BLOOD BY AUTOMATED COUNT: 15.9 % (ref 10–15)
ERYTHROCYTE [DISTWIDTH] IN BLOOD BY AUTOMATED COUNT: 16.3 % (ref 10–15)
ERYTHROCYTE [DISTWIDTH] IN BLOOD BY AUTOMATED COUNT: 16.3 % (ref 10–15)
ERYTHROCYTE [DISTWIDTH] IN BLOOD BY AUTOMATED COUNT: 16.4 % (ref 10–15)
ERYTHROCYTE [DISTWIDTH] IN BLOOD BY AUTOMATED COUNT: 16.5 % (ref 10–15)
ERYTHROCYTE [DISTWIDTH] IN BLOOD BY AUTOMATED COUNT: 16.6 % (ref 10–15)
ERYTHROCYTE [DISTWIDTH] IN BLOOD BY AUTOMATED COUNT: 16.8 % (ref 10–15)
FIBRINOGEN PPP-MCNC: 336 MG/DL (ref 170–490)
FLUAV H1 2009 PAND RNA SPEC QL NAA+PROBE: NOT DETECTED
FLUAV H1 RNA SPEC QL NAA+PROBE: NOT DETECTED
FLUAV H3 RNA SPEC QL NAA+PROBE: NOT DETECTED
FLUAV RNA SPEC QL NAA+PROBE: NOT DETECTED
FLUBV RNA SPEC QL NAA+PROBE: NOT DETECTED
FOLATE SERPL-MCNC: >40 NG/ML (ref 4.6–34.8)
GFR SERPL CREATININE-BSD FRML MDRD: 34 ML/MIN/1.73M2
GFR SERPL CREATININE-BSD FRML MDRD: 36 ML/MIN/1.73M2
GFR SERPL CREATININE-BSD FRML MDRD: 37 ML/MIN/1.73M2
GFR SERPL CREATININE-BSD FRML MDRD: 37 ML/MIN/1.73M2
GFR SERPL CREATININE-BSD FRML MDRD: 38 ML/MIN/1.73M2
GFR SERPL CREATININE-BSD FRML MDRD: 39 ML/MIN/1.73M2
GFR SERPL CREATININE-BSD FRML MDRD: 41 ML/MIN/1.73M2
GFR SERPL CREATININE-BSD FRML MDRD: 43 ML/MIN/1.73M2
GFR SERPL CREATININE-BSD FRML MDRD: 44 ML/MIN/1.73M2
GFR SERPL CREATININE-BSD FRML MDRD: 44 ML/MIN/1.73M2
GFR SERPL CREATININE-BSD FRML MDRD: 46 ML/MIN/1.73M2
GFR SERPL CREATININE-BSD FRML MDRD: 46 ML/MIN/1.73M2
GFR SERPL CREATININE-BSD FRML MDRD: 50 ML/MIN/1.73M2
GFR SERPL CREATININE-BSD FRML MDRD: 51 ML/MIN/1.73M2
GLUCOSE SERPL-MCNC: 109 MG/DL (ref 70–99)
GLUCOSE SERPL-MCNC: 115 MG/DL (ref 70–99)
GLUCOSE SERPL-MCNC: 117 MG/DL (ref 70–99)
GLUCOSE SERPL-MCNC: 119 MG/DL (ref 70–99)
GLUCOSE SERPL-MCNC: 120 MG/DL (ref 70–99)
GLUCOSE SERPL-MCNC: 120 MG/DL (ref 70–99)
GLUCOSE SERPL-MCNC: 122 MG/DL (ref 70–99)
GLUCOSE SERPL-MCNC: 124 MG/DL (ref 70–99)
GLUCOSE SERPL-MCNC: 124 MG/DL (ref 70–99)
GLUCOSE SERPL-MCNC: 144 MG/DL (ref 70–99)
GLUCOSE SERPL-MCNC: 145 MG/DL (ref 70–99)
GLUCOSE SERPL-MCNC: 147 MG/DL (ref 70–99)
GLUCOSE SERPL-MCNC: 151 MG/DL (ref 70–99)
GLUCOSE SERPL-MCNC: 152 MG/DL (ref 70–99)
GLUCOSE SERPL-MCNC: 164 MG/DL (ref 70–99)
HADV DNA SPEC QL NAA+PROBE: NOT DETECTED
HBA1C MFR BLD: 6.5 %
HCO3 BLD-SCNC: 23 MMOL/L (ref 23–29)
HCO3 BLDV-SCNC: 28 MMOL/L (ref 24–30)
HCOV PNL SPEC NAA+PROBE: NOT DETECTED
HCT VFR BLD AUTO: 34.3 % (ref 40–53)
HCT VFR BLD AUTO: 35.6 % (ref 40–53)
HCT VFR BLD AUTO: 36.2 % (ref 40–53)
HCT VFR BLD AUTO: 36.3 % (ref 40–53)
HCT VFR BLD AUTO: 36.6 % (ref 40–53)
HCT VFR BLD AUTO: 37.4 % (ref 40–53)
HCT VFR BLD AUTO: 38 % (ref 40–53)
HCT VFR BLD AUTO: 39 % (ref 40–53)
HGB BLD-MCNC: 10.4 G/DL (ref 13.3–17.7)
HGB BLD-MCNC: 10.9 G/DL (ref 13.3–17.7)
HGB BLD-MCNC: 11 G/DL (ref 13.3–17.7)
HGB BLD-MCNC: 11.2 G/DL (ref 13.3–17.7)
HGB BLD-MCNC: 11.3 G/DL (ref 13.3–17.7)
HGB BLD-MCNC: 11.7 G/DL (ref 13.3–17.7)
HGB BLD-MCNC: 11.8 G/DL (ref 13.3–17.7)
HGB BLD-MCNC: 12.2 G/DL (ref 13.3–17.7)
HMPV RNA SPEC QL NAA+PROBE: NOT DETECTED
HOLD SPECIMEN: NORMAL
HPIV1 RNA SPEC QL NAA+PROBE: NOT DETECTED
HPIV2 RNA SPEC QL NAA+PROBE: NOT DETECTED
HPIV3 RNA SPEC QL NAA+PROBE: NOT DETECTED
HPIV4 RNA SPEC QL NAA+PROBE: NOT DETECTED
IMM GRANULOCYTES # BLD: 0 10E3/UL
IMM GRANULOCYTES # BLD: 0.1 10E3/UL
IMM GRANULOCYTES NFR BLD: 0 %
IMM GRANULOCYTES NFR BLD: 1 %
INR HOME MONITORING: 1.8 (ref 2–3)
INR HOME MONITORING: 1.9 (ref 2–3)
INR HOME MONITORING: 2 (ref 2–3)
INR HOME MONITORING: 2 (ref 2–3)
INR HOME MONITORING: 2.1 (ref 2–3)
INR HOME MONITORING: 2.1 (ref 2–3)
INR HOME MONITORING: 2.2 (ref 2–3)
INR HOME MONITORING: 2.2 (ref 2–3)
INR HOME MONITORING: 2.3 (ref 2–3)
INR HOME MONITORING: 2.4 (ref 2–3)
INR HOME MONITORING: 2.5 (ref 2–3)
INR HOME MONITORING: 2.7 (ref 2–3)
INR HOME MONITORING: 2.8 (ref 2–3)
INR HOME MONITORING: 2.9 (ref 2–3)
INR HOME MONITORING: 3 (ref 2–3)
INR HOME MONITORING: 3.1 (ref 2–3)
INR HOME MONITORING: 3.2 (ref 2–3)
INR HOME MONITORING: 3.4 (ref 2–3)
INR HOME MONITORING: 3.6 (ref 2–3)
INR PPP: 2.3 (ref 0.85–1.15)
INR PPP: 2.39 (ref 0.85–1.15)
INR PPP: 2.44 (ref 0.85–1.15)
INR PPP: 2.66 (ref 0.85–1.15)
INR PPP: 2.7 (ref 0.85–1.15)
INR PPP: 2.91 (ref 0.85–1.15)
INR PPP: 2.96 (ref 0.85–1.15)
INR PPP: 2.96 (ref 0.85–1.15)
INR PPP: 3.02 (ref 0.85–1.15)
INR PPP: 3.13 (ref 0.85–1.15)
INR PPP: 3.24 (ref 0.85–1.15)
INR PPP: 3.32 (ref 0.85–1.15)
INR PPP: 3.49 (ref 0.85–1.15)
INR PPP: 3.67 (ref 0.85–1.15)
INR PPP: 3.71 (ref 0.85–1.15)
INTERPRETATION ECG - MUSE: NORMAL
INTERPRETATION ECG - MUSE: NORMAL
LVEF ECHO: NORMAL
LVEF ECHO: NORMAL
LYMPHOCYTES # BLD AUTO: 1.4 10E3/UL (ref 0.8–5.3)
LYMPHOCYTES # BLD AUTO: 1.5 10E3/UL (ref 0.8–5.3)
LYMPHOCYTES NFR BLD AUTO: 14 %
LYMPHOCYTES NFR BLD AUTO: 21 %
M PNEUMO DNA SPEC QL NAA+PROBE: NOT DETECTED
MAGNESIUM SERPL-MCNC: 2 MG/DL (ref 1.7–2.3)
MAGNESIUM SERPL-MCNC: 2.1 MG/DL (ref 1.7–2.3)
MAGNESIUM SERPL-MCNC: 2.1 MG/DL (ref 1.7–2.3)
MAGNESIUM SERPL-MCNC: 2.2 MG/DL (ref 1.7–2.3)
MAGNESIUM SERPL-MCNC: 2.3 MG/DL (ref 1.7–2.3)
MCH RBC QN AUTO: 30.9 PG (ref 26.5–33)
MCH RBC QN AUTO: 31 PG (ref 26.5–33)
MCH RBC QN AUTO: 31.1 PG (ref 26.5–33)
MCH RBC QN AUTO: 31.2 PG (ref 26.5–33)
MCH RBC QN AUTO: 31.3 PG (ref 26.5–33)
MCH RBC QN AUTO: 31.3 PG (ref 26.5–33)
MCH RBC QN AUTO: 31.4 PG (ref 26.5–33)
MCH RBC QN AUTO: 32.1 PG (ref 26.5–33)
MCHC RBC AUTO-ENTMCNC: 29.8 G/DL (ref 31.5–36.5)
MCHC RBC AUTO-ENTMCNC: 30.2 G/DL (ref 31.5–36.5)
MCHC RBC AUTO-ENTMCNC: 30.3 G/DL (ref 31.5–36.5)
MCHC RBC AUTO-ENTMCNC: 30.9 G/DL (ref 31.5–36.5)
MCHC RBC AUTO-ENTMCNC: 30.9 G/DL (ref 31.5–36.5)
MCHC RBC AUTO-ENTMCNC: 31.1 G/DL (ref 31.5–36.5)
MCHC RBC AUTO-ENTMCNC: 31.3 G/DL (ref 31.5–36.5)
MCHC RBC AUTO-ENTMCNC: 32.3 G/DL (ref 31.5–36.5)
MCV RBC AUTO: 100 FL (ref 78–100)
MCV RBC AUTO: 101 FL (ref 78–100)
MCV RBC AUTO: 102 FL (ref 78–100)
MCV RBC AUTO: 103 FL (ref 78–100)
MCV RBC AUTO: 105 FL (ref 78–100)
MCV RBC AUTO: 99 FL (ref 78–100)
MDC_IDC_EPISODE_DTM: NORMAL
MDC_IDC_EPISODE_DURATION: 1 S
MDC_IDC_EPISODE_DURATION: 10 S
MDC_IDC_EPISODE_DURATION: 10 S
MDC_IDC_EPISODE_DURATION: 100 S
MDC_IDC_EPISODE_DURATION: 100 S
MDC_IDC_EPISODE_DURATION: 101 S
MDC_IDC_EPISODE_DURATION: 102 S
MDC_IDC_EPISODE_DURATION: 103 S
MDC_IDC_EPISODE_DURATION: 103 S
MDC_IDC_EPISODE_DURATION: 104 S
MDC_IDC_EPISODE_DURATION: 105 S
MDC_IDC_EPISODE_DURATION: 106 S
MDC_IDC_EPISODE_DURATION: 106 S
MDC_IDC_EPISODE_DURATION: 109 S
MDC_IDC_EPISODE_DURATION: 11 S
MDC_IDC_EPISODE_DURATION: 110 S
MDC_IDC_EPISODE_DURATION: 111 S
MDC_IDC_EPISODE_DURATION: 111 S
MDC_IDC_EPISODE_DURATION: 112 S
MDC_IDC_EPISODE_DURATION: 113 S
MDC_IDC_EPISODE_DURATION: 114 S
MDC_IDC_EPISODE_DURATION: 115 S
MDC_IDC_EPISODE_DURATION: 115 S
MDC_IDC_EPISODE_DURATION: 116 S
MDC_IDC_EPISODE_DURATION: 117 S
MDC_IDC_EPISODE_DURATION: 118 S
MDC_IDC_EPISODE_DURATION: 12 S
MDC_IDC_EPISODE_DURATION: 120 S
MDC_IDC_EPISODE_DURATION: 122 S
MDC_IDC_EPISODE_DURATION: 124 S
MDC_IDC_EPISODE_DURATION: 127 S
MDC_IDC_EPISODE_DURATION: 128 S
MDC_IDC_EPISODE_DURATION: 129 S
MDC_IDC_EPISODE_DURATION: 13 S
MDC_IDC_EPISODE_DURATION: 133 S
MDC_IDC_EPISODE_DURATION: 134 S
MDC_IDC_EPISODE_DURATION: 135 S
MDC_IDC_EPISODE_DURATION: 136 S
MDC_IDC_EPISODE_DURATION: 138 S
MDC_IDC_EPISODE_DURATION: 139 S
MDC_IDC_EPISODE_DURATION: 14 S
MDC_IDC_EPISODE_DURATION: 14 S
MDC_IDC_EPISODE_DURATION: 140 S
MDC_IDC_EPISODE_DURATION: 140 S
MDC_IDC_EPISODE_DURATION: 141 S
MDC_IDC_EPISODE_DURATION: 141 S
MDC_IDC_EPISODE_DURATION: 142 S
MDC_IDC_EPISODE_DURATION: 143 S
MDC_IDC_EPISODE_DURATION: 145 S
MDC_IDC_EPISODE_DURATION: 146 S
MDC_IDC_EPISODE_DURATION: 146 S
MDC_IDC_EPISODE_DURATION: 147 S
MDC_IDC_EPISODE_DURATION: 148 S
MDC_IDC_EPISODE_DURATION: 149 S
MDC_IDC_EPISODE_DURATION: 15 S
MDC_IDC_EPISODE_DURATION: 151 S
MDC_IDC_EPISODE_DURATION: 151 S
MDC_IDC_EPISODE_DURATION: 152 S
MDC_IDC_EPISODE_DURATION: 156 S
MDC_IDC_EPISODE_DURATION: 157 S
MDC_IDC_EPISODE_DURATION: 158 S
MDC_IDC_EPISODE_DURATION: 158 S
MDC_IDC_EPISODE_DURATION: 16 S
MDC_IDC_EPISODE_DURATION: 160 S
MDC_IDC_EPISODE_DURATION: 161 S
MDC_IDC_EPISODE_DURATION: 161 S
MDC_IDC_EPISODE_DURATION: 162 S
MDC_IDC_EPISODE_DURATION: 163 S
MDC_IDC_EPISODE_DURATION: 164 S
MDC_IDC_EPISODE_DURATION: 164 S
MDC_IDC_EPISODE_DURATION: 166 S
MDC_IDC_EPISODE_DURATION: 167 S
MDC_IDC_EPISODE_DURATION: 168 S
MDC_IDC_EPISODE_DURATION: 17 S
MDC_IDC_EPISODE_DURATION: 17 S
MDC_IDC_EPISODE_DURATION: 170 S
MDC_IDC_EPISODE_DURATION: 170 S
MDC_IDC_EPISODE_DURATION: 172 S
MDC_IDC_EPISODE_DURATION: 174 S
MDC_IDC_EPISODE_DURATION: 176 S
MDC_IDC_EPISODE_DURATION: 179 S
MDC_IDC_EPISODE_DURATION: 18 S
MDC_IDC_EPISODE_DURATION: 180 S
MDC_IDC_EPISODE_DURATION: 182 S
MDC_IDC_EPISODE_DURATION: 185 S
MDC_IDC_EPISODE_DURATION: 186 S
MDC_IDC_EPISODE_DURATION: 19 S
MDC_IDC_EPISODE_DURATION: 195 S
MDC_IDC_EPISODE_DURATION: 196 S
MDC_IDC_EPISODE_DURATION: 197 S
MDC_IDC_EPISODE_DURATION: 198 S
MDC_IDC_EPISODE_DURATION: 2 S
MDC_IDC_EPISODE_DURATION: 20 S
MDC_IDC_EPISODE_DURATION: 20 S
MDC_IDC_EPISODE_DURATION: 201 S
MDC_IDC_EPISODE_DURATION: 203 S
MDC_IDC_EPISODE_DURATION: 204 S
MDC_IDC_EPISODE_DURATION: 204 S
MDC_IDC_EPISODE_DURATION: 205 S
MDC_IDC_EPISODE_DURATION: 207 S
MDC_IDC_EPISODE_DURATION: 208 S
MDC_IDC_EPISODE_DURATION: 208 S
MDC_IDC_EPISODE_DURATION: 21 S
MDC_IDC_EPISODE_DURATION: 22 S
MDC_IDC_EPISODE_DURATION: 22 S
MDC_IDC_EPISODE_DURATION: 220 S
MDC_IDC_EPISODE_DURATION: 222 S
MDC_IDC_EPISODE_DURATION: 225 S
MDC_IDC_EPISODE_DURATION: 229 S
MDC_IDC_EPISODE_DURATION: 229 S
MDC_IDC_EPISODE_DURATION: 23 S
MDC_IDC_EPISODE_DURATION: 230 S
MDC_IDC_EPISODE_DURATION: 234 S
MDC_IDC_EPISODE_DURATION: 24 S
MDC_IDC_EPISODE_DURATION: 244 S
MDC_IDC_EPISODE_DURATION: 246 S
MDC_IDC_EPISODE_DURATION: 25 S
MDC_IDC_EPISODE_DURATION: 253 S
MDC_IDC_EPISODE_DURATION: 254 S
MDC_IDC_EPISODE_DURATION: 26 S
MDC_IDC_EPISODE_DURATION: 26 S
MDC_IDC_EPISODE_DURATION: 260 S
MDC_IDC_EPISODE_DURATION: 267 S
MDC_IDC_EPISODE_DURATION: 27 S
MDC_IDC_EPISODE_DURATION: 270 S
MDC_IDC_EPISODE_DURATION: 274 S
MDC_IDC_EPISODE_DURATION: 28 S
MDC_IDC_EPISODE_DURATION: 28 S
MDC_IDC_EPISODE_DURATION: 281 S
MDC_IDC_EPISODE_DURATION: 285 S
MDC_IDC_EPISODE_DURATION: 29 S
MDC_IDC_EPISODE_DURATION: 29 S
MDC_IDC_EPISODE_DURATION: 3 S
MDC_IDC_EPISODE_DURATION: 30 S
MDC_IDC_EPISODE_DURATION: 30 S
MDC_IDC_EPISODE_DURATION: 301 S
MDC_IDC_EPISODE_DURATION: 309 S
MDC_IDC_EPISODE_DURATION: 31 S
MDC_IDC_EPISODE_DURATION: 31 S
MDC_IDC_EPISODE_DURATION: 32 S
MDC_IDC_EPISODE_DURATION: 323 S
MDC_IDC_EPISODE_DURATION: 326 S
MDC_IDC_EPISODE_DURATION: 328 S
MDC_IDC_EPISODE_DURATION: 33 S
MDC_IDC_EPISODE_DURATION: 330 S
MDC_IDC_EPISODE_DURATION: 34 S
MDC_IDC_EPISODE_DURATION: 345 S
MDC_IDC_EPISODE_DURATION: 35 S
MDC_IDC_EPISODE_DURATION: 354 S
MDC_IDC_EPISODE_DURATION: 36 S
MDC_IDC_EPISODE_DURATION: 368 S
MDC_IDC_EPISODE_DURATION: 37 S
MDC_IDC_EPISODE_DURATION: 373 S
MDC_IDC_EPISODE_DURATION: 38 S
MDC_IDC_EPISODE_DURATION: 381 S
MDC_IDC_EPISODE_DURATION: 383 S
MDC_IDC_EPISODE_DURATION: 39 S
MDC_IDC_EPISODE_DURATION: 4 S
MDC_IDC_EPISODE_DURATION: 4 S
MDC_IDC_EPISODE_DURATION: 40 S
MDC_IDC_EPISODE_DURATION: 44 S
MDC_IDC_EPISODE_DURATION: 45 S
MDC_IDC_EPISODE_DURATION: 45 S
MDC_IDC_EPISODE_DURATION: 46 S
MDC_IDC_EPISODE_DURATION: 47 S
MDC_IDC_EPISODE_DURATION: 48 S
MDC_IDC_EPISODE_DURATION: 49 S
MDC_IDC_EPISODE_DURATION: 491 S
MDC_IDC_EPISODE_DURATION: 5 S
MDC_IDC_EPISODE_DURATION: 5 S
MDC_IDC_EPISODE_DURATION: 50 S
MDC_IDC_EPISODE_DURATION: 525 S
MDC_IDC_EPISODE_DURATION: 54 S
MDC_IDC_EPISODE_DURATION: 57 S
MDC_IDC_EPISODE_DURATION: 57 S
MDC_IDC_EPISODE_DURATION: 59 S
MDC_IDC_EPISODE_DURATION: 6 S
MDC_IDC_EPISODE_DURATION: 61 S
MDC_IDC_EPISODE_DURATION: 62 S
MDC_IDC_EPISODE_DURATION: 63 S
MDC_IDC_EPISODE_DURATION: 63 S
MDC_IDC_EPISODE_DURATION: 64 S
MDC_IDC_EPISODE_DURATION: 69 S
MDC_IDC_EPISODE_DURATION: 72 S
MDC_IDC_EPISODE_DURATION: 72 S
MDC_IDC_EPISODE_DURATION: 73 S
MDC_IDC_EPISODE_DURATION: 73 S
MDC_IDC_EPISODE_DURATION: 75 S
MDC_IDC_EPISODE_DURATION: 75 S
MDC_IDC_EPISODE_DURATION: 76 S
MDC_IDC_EPISODE_DURATION: 77 S
MDC_IDC_EPISODE_DURATION: 78 S
MDC_IDC_EPISODE_DURATION: 789 S
MDC_IDC_EPISODE_DURATION: 79 S
MDC_IDC_EPISODE_DURATION: 79 S
MDC_IDC_EPISODE_DURATION: 80 S
MDC_IDC_EPISODE_DURATION: 80 S
MDC_IDC_EPISODE_DURATION: 81 S
MDC_IDC_EPISODE_DURATION: 82 S
MDC_IDC_EPISODE_DURATION: 83 S
MDC_IDC_EPISODE_DURATION: 83 S
MDC_IDC_EPISODE_DURATION: 85 S
MDC_IDC_EPISODE_DURATION: 85 S
MDC_IDC_EPISODE_DURATION: 86 S
MDC_IDC_EPISODE_DURATION: 86 S
MDC_IDC_EPISODE_DURATION: 87 S
MDC_IDC_EPISODE_DURATION: 89 S
MDC_IDC_EPISODE_DURATION: 9 S
MDC_IDC_EPISODE_DURATION: 90 S
MDC_IDC_EPISODE_DURATION: 91 S
MDC_IDC_EPISODE_DURATION: 92 S
MDC_IDC_EPISODE_DURATION: 94 S
MDC_IDC_EPISODE_DURATION: 94 S
MDC_IDC_EPISODE_DURATION: 95 S
MDC_IDC_EPISODE_DURATION: 96 S
MDC_IDC_EPISODE_DURATION: 96 S
MDC_IDC_EPISODE_DURATION: 97 S
MDC_IDC_EPISODE_DURATION: 98 S
MDC_IDC_EPISODE_ID: 1170
MDC_IDC_EPISODE_ID: 1174
MDC_IDC_EPISODE_ID: 1175
MDC_IDC_EPISODE_ID: 1182
MDC_IDC_EPISODE_ID: 1188
MDC_IDC_EPISODE_ID: 1189
MDC_IDC_EPISODE_ID: 1190
MDC_IDC_EPISODE_ID: 1206
MDC_IDC_EPISODE_ID: 1210
MDC_IDC_EPISODE_ID: 1227
MDC_IDC_EPISODE_ID: 1238
MDC_IDC_EPISODE_ID: 1260
MDC_IDC_EPISODE_ID: 1264
MDC_IDC_EPISODE_ID: 1270
MDC_IDC_EPISODE_ID: 1271
MDC_IDC_EPISODE_ID: 1272
MDC_IDC_EPISODE_ID: 1275
MDC_IDC_EPISODE_ID: 1287
MDC_IDC_EPISODE_ID: 1312
MDC_IDC_EPISODE_ID: 1313
MDC_IDC_EPISODE_ID: 1320
MDC_IDC_EPISODE_ID: 1321
MDC_IDC_EPISODE_ID: 1323
MDC_IDC_EPISODE_ID: 1325
MDC_IDC_EPISODE_ID: 1329
MDC_IDC_EPISODE_ID: 1330
MDC_IDC_EPISODE_ID: 1331
MDC_IDC_EPISODE_ID: 1332
MDC_IDC_EPISODE_ID: 1333
MDC_IDC_EPISODE_ID: 1334
MDC_IDC_EPISODE_ID: 1335
MDC_IDC_EPISODE_ID: 1337
MDC_IDC_EPISODE_ID: 1340
MDC_IDC_EPISODE_ID: 1341
MDC_IDC_EPISODE_ID: 1342
MDC_IDC_EPISODE_ID: 1343
MDC_IDC_EPISODE_ID: 1346
MDC_IDC_EPISODE_ID: 1347
MDC_IDC_EPISODE_ID: 1349
MDC_IDC_EPISODE_ID: 1355
MDC_IDC_EPISODE_ID: 1360
MDC_IDC_EPISODE_ID: 1364
MDC_IDC_EPISODE_ID: 1370
MDC_IDC_EPISODE_ID: 1381
MDC_IDC_EPISODE_ID: 1387
MDC_IDC_EPISODE_ID: 1398
MDC_IDC_EPISODE_ID: 1399
MDC_IDC_EPISODE_ID: 1400
MDC_IDC_EPISODE_ID: 1406
MDC_IDC_EPISODE_ID: 1407
MDC_IDC_EPISODE_ID: 1417
MDC_IDC_EPISODE_ID: 1422
MDC_IDC_EPISODE_ID: 1423
MDC_IDC_EPISODE_ID: 1426
MDC_IDC_EPISODE_ID: 1445
MDC_IDC_EPISODE_ID: 1447
MDC_IDC_EPISODE_ID: 1448
MDC_IDC_EPISODE_ID: 1449
MDC_IDC_EPISODE_ID: 1451
MDC_IDC_EPISODE_ID: 1457
MDC_IDC_EPISODE_ID: 1461
MDC_IDC_EPISODE_ID: 1466
MDC_IDC_EPISODE_ID: 1470
MDC_IDC_EPISODE_ID: 1473
MDC_IDC_EPISODE_ID: 1476
MDC_IDC_EPISODE_ID: 1478
MDC_IDC_EPISODE_ID: 1483
MDC_IDC_EPISODE_ID: 1484
MDC_IDC_EPISODE_ID: 1485
MDC_IDC_EPISODE_ID: 1489
MDC_IDC_EPISODE_ID: 1491
MDC_IDC_EPISODE_ID: 1502
MDC_IDC_EPISODE_ID: 1512
MDC_IDC_EPISODE_ID: 1518
MDC_IDC_EPISODE_ID: 1519
MDC_IDC_EPISODE_ID: 1520
MDC_IDC_EPISODE_ID: 1521
MDC_IDC_EPISODE_ID: 1524
MDC_IDC_EPISODE_ID: 1528
MDC_IDC_EPISODE_ID: 1547
MDC_IDC_EPISODE_ID: 1550
MDC_IDC_EPISODE_ID: 1560
MDC_IDC_EPISODE_ID: 1561
MDC_IDC_EPISODE_ID: 1569
MDC_IDC_EPISODE_ID: 1573
MDC_IDC_EPISODE_ID: 1574
MDC_IDC_EPISODE_ID: 1575
MDC_IDC_EPISODE_ID: 1577
MDC_IDC_EPISODE_ID: 1580
MDC_IDC_EPISODE_ID: 1587
MDC_IDC_EPISODE_ID: 1593
MDC_IDC_EPISODE_ID: 1594
MDC_IDC_EPISODE_ID: 1600
MDC_IDC_EPISODE_ID: 1610
MDC_IDC_EPISODE_ID: 1641
MDC_IDC_EPISODE_ID: 1645
MDC_IDC_EPISODE_ID: 1646
MDC_IDC_EPISODE_ID: 1660
MDC_IDC_EPISODE_ID: 1661
MDC_IDC_EPISODE_ID: 1662
MDC_IDC_EPISODE_ID: 1663
MDC_IDC_EPISODE_ID: 1664
MDC_IDC_EPISODE_ID: 1665
MDC_IDC_EPISODE_ID: 1666
MDC_IDC_EPISODE_ID: 1667
MDC_IDC_EPISODE_ID: 1668
MDC_IDC_EPISODE_ID: 1669
MDC_IDC_EPISODE_ID: 1670
MDC_IDC_EPISODE_ID: 1671
MDC_IDC_EPISODE_ID: 1672
MDC_IDC_EPISODE_ID: 1673
MDC_IDC_EPISODE_ID: 1674
MDC_IDC_EPISODE_ID: 1675
MDC_IDC_EPISODE_ID: 1676
MDC_IDC_EPISODE_ID: 1677
MDC_IDC_EPISODE_ID: 1678
MDC_IDC_EPISODE_ID: 1679
MDC_IDC_EPISODE_ID: 1680
MDC_IDC_EPISODE_ID: 1681
MDC_IDC_EPISODE_ID: 1682
MDC_IDC_EPISODE_ID: 1683
MDC_IDC_EPISODE_ID: 1684
MDC_IDC_EPISODE_ID: 1685
MDC_IDC_EPISODE_ID: 1686
MDC_IDC_EPISODE_ID: 1687
MDC_IDC_EPISODE_ID: 1688
MDC_IDC_EPISODE_ID: 1689
MDC_IDC_EPISODE_ID: 1690
MDC_IDC_EPISODE_ID: 1691
MDC_IDC_EPISODE_ID: 1692
MDC_IDC_EPISODE_ID: 1693
MDC_IDC_EPISODE_ID: 1694
MDC_IDC_EPISODE_ID: 1695
MDC_IDC_EPISODE_ID: 1696
MDC_IDC_EPISODE_ID: 1697
MDC_IDC_EPISODE_ID: 1698
MDC_IDC_EPISODE_ID: 1699
MDC_IDC_EPISODE_ID: 1700
MDC_IDC_EPISODE_ID: 1701
MDC_IDC_EPISODE_ID: 1702
MDC_IDC_EPISODE_ID: 1703
MDC_IDC_EPISODE_ID: 1704
MDC_IDC_EPISODE_ID: 1705
MDC_IDC_EPISODE_ID: 1706
MDC_IDC_EPISODE_ID: 1707
MDC_IDC_EPISODE_ID: 1708
MDC_IDC_EPISODE_ID: 1709
MDC_IDC_EPISODE_ID: 1710
MDC_IDC_EPISODE_ID: 1711
MDC_IDC_EPISODE_ID: 1712
MDC_IDC_EPISODE_ID: 3174
MDC_IDC_EPISODE_ID: 3175
MDC_IDC_EPISODE_ID: 3179
MDC_IDC_EPISODE_ID: 3189
MDC_IDC_EPISODE_ID: 3191
MDC_IDC_EPISODE_ID: 3204
MDC_IDC_EPISODE_ID: 3206
MDC_IDC_EPISODE_ID: 3214
MDC_IDC_EPISODE_ID: 3215
MDC_IDC_EPISODE_ID: 3222
MDC_IDC_EPISODE_ID: 3224
MDC_IDC_EPISODE_ID: 3494
MDC_IDC_EPISODE_ID: 3756
MDC_IDC_EPISODE_ID: 3758
MDC_IDC_EPISODE_ID: 3798
MDC_IDC_EPISODE_ID: 7124
MDC_IDC_EPISODE_ID: 7125
MDC_IDC_EPISODE_ID: 7126
MDC_IDC_EPISODE_ID: 7128
MDC_IDC_EPISODE_ID: 7131
MDC_IDC_EPISODE_ID: 7132
MDC_IDC_EPISODE_ID: 7133
MDC_IDC_EPISODE_ID: 7137
MDC_IDC_EPISODE_ID: 7140
MDC_IDC_EPISODE_ID: 7141
MDC_IDC_EPISODE_ID: 7143
MDC_IDC_EPISODE_ID: 7160
MDC_IDC_EPISODE_ID: 7178
MDC_IDC_EPISODE_ID: 7185
MDC_IDC_EPISODE_ID: 7205
MDC_IDC_EPISODE_ID: 7211
MDC_IDC_EPISODE_ID: 7227
MDC_IDC_EPISODE_ID: 7236
MDC_IDC_EPISODE_ID: 7240
MDC_IDC_EPISODE_ID: 7243
MDC_IDC_EPISODE_ID: 7245
MDC_IDC_EPISODE_ID: 7246
MDC_IDC_EPISODE_ID: 7250
MDC_IDC_EPISODE_ID: 7265
MDC_IDC_EPISODE_ID: 7268
MDC_IDC_EPISODE_ID: 7269
MDC_IDC_EPISODE_ID: 7279
MDC_IDC_EPISODE_ID: 7282
MDC_IDC_EPISODE_ID: 7288
MDC_IDC_EPISODE_ID: 7291
MDC_IDC_EPISODE_ID: 7292
MDC_IDC_EPISODE_ID: 7294
MDC_IDC_EPISODE_ID: 7295
MDC_IDC_EPISODE_ID: 7323
MDC_IDC_EPISODE_ID: 7326
MDC_IDC_EPISODE_ID: 7329
MDC_IDC_EPISODE_ID: 7330
MDC_IDC_EPISODE_ID: 7337
MDC_IDC_EPISODE_ID: 7354
MDC_IDC_EPISODE_ID: 7357
MDC_IDC_EPISODE_ID: 7358
MDC_IDC_EPISODE_ID: 7359
MDC_IDC_EPISODE_ID: 7360
MDC_IDC_EPISODE_ID: 7378
MDC_IDC_EPISODE_ID: 7382
MDC_IDC_EPISODE_ID: 7385
MDC_IDC_EPISODE_ID: 7386
MDC_IDC_EPISODE_ID: 7387
MDC_IDC_EPISODE_ID: 7388
MDC_IDC_EPISODE_ID: 7389
MDC_IDC_EPISODE_ID: 7390
MDC_IDC_EPISODE_ID: 7391
MDC_IDC_EPISODE_ID: 7392
MDC_IDC_EPISODE_ID: 7397
MDC_IDC_EPISODE_ID: 7403
MDC_IDC_EPISODE_ID: 7405
MDC_IDC_EPISODE_ID: 7407
MDC_IDC_EPISODE_ID: 7412
MDC_IDC_EPISODE_ID: 7418
MDC_IDC_EPISODE_ID: 7419
MDC_IDC_EPISODE_ID: 7424
MDC_IDC_EPISODE_ID: 7425
MDC_IDC_EPISODE_ID: 7429
MDC_IDC_EPISODE_ID: 7430
MDC_IDC_EPISODE_ID: 7432
MDC_IDC_EPISODE_ID: 7433
MDC_IDC_EPISODE_ID: 7434
MDC_IDC_EPISODE_ID: 7443
MDC_IDC_EPISODE_ID: 7446
MDC_IDC_EPISODE_ID: 7449
MDC_IDC_EPISODE_ID: 7459
MDC_IDC_EPISODE_ID: 7461
MDC_IDC_EPISODE_ID: 7462
MDC_IDC_EPISODE_ID: 7468
MDC_IDC_EPISODE_ID: 7469
MDC_IDC_EPISODE_ID: 7470
MDC_IDC_EPISODE_ID: 7474
MDC_IDC_EPISODE_ID: 7477
MDC_IDC_EPISODE_ID: 7479
MDC_IDC_EPISODE_ID: 7493
MDC_IDC_EPISODE_ID: 7495
MDC_IDC_EPISODE_ID: 7497
MDC_IDC_EPISODE_ID: 7498
MDC_IDC_EPISODE_ID: 7501
MDC_IDC_EPISODE_ID: 7503
MDC_IDC_EPISODE_ID: 7512
MDC_IDC_EPISODE_ID: 7514
MDC_IDC_EPISODE_ID: 7529
MDC_IDC_EPISODE_ID: 7532
MDC_IDC_EPISODE_ID: 7549
MDC_IDC_EPISODE_ID: 7574
MDC_IDC_EPISODE_ID: 7576
MDC_IDC_EPISODE_ID: 7577
MDC_IDC_EPISODE_ID: 7578
MDC_IDC_EPISODE_ID: 7579
MDC_IDC_EPISODE_ID: 7580
MDC_IDC_EPISODE_ID: 7581
MDC_IDC_EPISODE_ID: 7582
MDC_IDC_EPISODE_ID: 7583
MDC_IDC_EPISODE_ID: 7584
MDC_IDC_EPISODE_ID: 7585
MDC_IDC_EPISODE_ID: 7586
MDC_IDC_EPISODE_ID: 7587
MDC_IDC_EPISODE_ID: 7588
MDC_IDC_EPISODE_ID: 7589
MDC_IDC_EPISODE_ID: 7590
MDC_IDC_EPISODE_ID: 7591
MDC_IDC_EPISODE_ID: 7592
MDC_IDC_EPISODE_ID: 7593
MDC_IDC_EPISODE_ID: 7594
MDC_IDC_EPISODE_ID: 7595
MDC_IDC_EPISODE_ID: 7596
MDC_IDC_EPISODE_ID: 7597
MDC_IDC_EPISODE_ID: 7598
MDC_IDC_EPISODE_ID: 7599
MDC_IDC_EPISODE_ID: 7600
MDC_IDC_EPISODE_ID: 7601
MDC_IDC_EPISODE_ID: 7602
MDC_IDC_EPISODE_ID: 7603
MDC_IDC_EPISODE_ID: 7604
MDC_IDC_EPISODE_ID: 7605
MDC_IDC_EPISODE_ID: 7606
MDC_IDC_EPISODE_ID: 7607
MDC_IDC_EPISODE_ID: 7608
MDC_IDC_EPISODE_ID: 7609
MDC_IDC_EPISODE_ID: 7610
MDC_IDC_EPISODE_ID: 7611
MDC_IDC_EPISODE_ID: 7612
MDC_IDC_EPISODE_ID: 7613
MDC_IDC_EPISODE_ID: 7614
MDC_IDC_EPISODE_ID: 7615
MDC_IDC_EPISODE_ID: 7616
MDC_IDC_EPISODE_ID: 7617
MDC_IDC_EPISODE_ID: 7618
MDC_IDC_EPISODE_ID: 7619
MDC_IDC_EPISODE_ID: 7620
MDC_IDC_EPISODE_ID: 7621
MDC_IDC_EPISODE_ID: 7622
MDC_IDC_EPISODE_ID: 7623
MDC_IDC_EPISODE_ID: 7624
MDC_IDC_EPISODE_ID: 7625
MDC_IDC_EPISODE_ID: 7626
MDC_IDC_EPISODE_ID: 7627
MDC_IDC_EPISODE_ID: 7628
MDC_IDC_EPISODE_ID: 7629
MDC_IDC_EPISODE_ID: 7630
MDC_IDC_EPISODE_ID: 7631
MDC_IDC_EPISODE_ID: 7632
MDC_IDC_EPISODE_ID: 7633
MDC_IDC_EPISODE_ID: 7634
MDC_IDC_EPISODE_ID: 7635
MDC_IDC_EPISODE_ID: 7636
MDC_IDC_EPISODE_ID: 7637
MDC_IDC_EPISODE_ID: 7638
MDC_IDC_EPISODE_ID: 7639
MDC_IDC_EPISODE_ID: 7640
MDC_IDC_EPISODE_ID: 7641
MDC_IDC_EPISODE_ID: 7642
MDC_IDC_EPISODE_ID: 7643
MDC_IDC_EPISODE_ID: 7644
MDC_IDC_EPISODE_ID: 7645
MDC_IDC_EPISODE_ID: 7646
MDC_IDC_EPISODE_ID: 7647
MDC_IDC_EPISODE_ID: 7648
MDC_IDC_EPISODE_ID: 7649
MDC_IDC_EPISODE_ID: 7650
MDC_IDC_EPISODE_ID: 7651
MDC_IDC_EPISODE_THERAPY_RESULT: NORMAL
MDC_IDC_EPISODE_TYPE: NORMAL
MDC_IDC_LEAD_CONNECTION_STATUS: NORMAL
MDC_IDC_LEAD_IMPLANT_DT: NORMAL
MDC_IDC_LEAD_LOCATION: NORMAL
MDC_IDC_LEAD_LOCATION_DETAIL_1: NORMAL
MDC_IDC_LEAD_MFG: NORMAL
MDC_IDC_LEAD_MODEL: NORMAL
MDC_IDC_LEAD_POLARITY_TYPE: NORMAL
MDC_IDC_LEAD_SERIAL: NORMAL
MDC_IDC_MSMT_BATTERY_DTM: NORMAL
MDC_IDC_MSMT_BATTERY_REMAINING_LONGEVITY: 109 MO
MDC_IDC_MSMT_BATTERY_REMAINING_LONGEVITY: 111 MO
MDC_IDC_MSMT_BATTERY_REMAINING_LONGEVITY: 112 MO
MDC_IDC_MSMT_BATTERY_REMAINING_LONGEVITY: 117 MO
MDC_IDC_MSMT_BATTERY_REMAINING_LONGEVITY: 129 MO
MDC_IDC_MSMT_BATTERY_RRT_TRIGGER: NORMAL
MDC_IDC_MSMT_BATTERY_STATUS: NORMAL
MDC_IDC_MSMT_BATTERY_VOLTAGE: 2.96 V
MDC_IDC_MSMT_BATTERY_VOLTAGE: 2.98 V
MDC_IDC_MSMT_BATTERY_VOLTAGE: 2.99 V
MDC_IDC_MSMT_BATTERY_VOLTAGE: 3 V
MDC_IDC_MSMT_BATTERY_VOLTAGE: 3 V
MDC_IDC_MSMT_CAP_CHARGE_DTM: NORMAL
MDC_IDC_MSMT_CAP_CHARGE_ENERGY: 18 J
MDC_IDC_MSMT_CAP_CHARGE_TIME: 3.9 S
MDC_IDC_MSMT_CAP_CHARGE_TYPE: NORMAL
MDC_IDC_MSMT_LEADCHNL_RA_IMPEDANCE_VALUE: 342 OHM
MDC_IDC_MSMT_LEADCHNL_RA_IMPEDANCE_VALUE: 342 OHM
MDC_IDC_MSMT_LEADCHNL_RA_IMPEDANCE_VALUE: 361 OHM
MDC_IDC_MSMT_LEADCHNL_RA_IMPEDANCE_VALUE: 380 OHM
MDC_IDC_MSMT_LEADCHNL_RA_SENSING_INTR_AMPL: 0.1 MV
MDC_IDC_MSMT_LEADCHNL_RV_IMPEDANCE_VALUE: 247 OHM
MDC_IDC_MSMT_LEADCHNL_RV_IMPEDANCE_VALUE: 266 OHM
MDC_IDC_MSMT_LEADCHNL_RV_IMPEDANCE_VALUE: 323 OHM
MDC_IDC_MSMT_LEADCHNL_RV_IMPEDANCE_VALUE: 342 OHM
MDC_IDC_MSMT_LEADCHNL_RV_IMPEDANCE_VALUE: 361 OHM
MDC_IDC_MSMT_LEADCHNL_RV_IMPEDANCE_VALUE: 361 OHM
MDC_IDC_MSMT_LEADCHNL_RV_PACING_THRESHOLD_AMPLITUDE: 0.62 V
MDC_IDC_MSMT_LEADCHNL_RV_PACING_THRESHOLD_AMPLITUDE: 0.75 V
MDC_IDC_MSMT_LEADCHNL_RV_PACING_THRESHOLD_AMPLITUDE: 0.75 V
MDC_IDC_MSMT_LEADCHNL_RV_PACING_THRESHOLD_PULSEWIDTH: 0.4 MS
MDC_IDC_MSMT_LEADCHNL_RV_SENSING_INTR_AMPL: 10.6 MV
MDC_IDC_MSMT_LEADCHNL_RV_SENSING_INTR_AMPL: 11 MV
MDC_IDC_MSMT_LEADCHNL_RV_SENSING_INTR_AMPL: 11 MV
MDC_IDC_MSMT_LEADCHNL_RV_SENSING_INTR_AMPL: 11.5 MV
MDC_IDC_MSMT_LEADCHNL_RV_SENSING_INTR_AMPL: 11.5 MV
MDC_IDC_MSMT_LEADCHNL_RV_SENSING_INTR_AMPL: 8.8 MV
MDC_IDC_MSMT_LEADCHNL_RV_SENSING_INTR_AMPL: 9.1 MV
MDC_IDC_PG_IMPLANT_DTM: NORMAL
MDC_IDC_PG_MFG: NORMAL
MDC_IDC_PG_MODEL: NORMAL
MDC_IDC_PG_SERIAL: NORMAL
MDC_IDC_PG_TYPE: NORMAL
MDC_IDC_SESS_CLINIC_NAME: NORMAL
MDC_IDC_SESS_DTM: NORMAL
MDC_IDC_SESS_TYPE: NORMAL
MDC_IDC_SET_BRADY_AT_MODE_SWITCH_RATE: 171 {BEATS}/MIN
MDC_IDC_SET_BRADY_LOWRATE: 60 {BEATS}/MIN
MDC_IDC_SET_BRADY_MAX_SENSOR_RATE: 120 {BEATS}/MIN
MDC_IDC_SET_BRADY_MAX_TRACKING_RATE: 130 {BEATS}/MIN
MDC_IDC_SET_BRADY_MODE: NORMAL
MDC_IDC_SET_BRADY_PAV_DELAY_LOW: 180 MS
MDC_IDC_SET_BRADY_SAV_DELAY_LOW: 150 MS
MDC_IDC_SET_LEADCHNL_RA_PACING_AMPLITUDE: 2 V
MDC_IDC_SET_LEADCHNL_RA_PACING_AMPLITUDE: NORMAL
MDC_IDC_SET_LEADCHNL_RA_PACING_ANODE_ELECTRODE_1: NORMAL
MDC_IDC_SET_LEADCHNL_RA_PACING_ANODE_LOCATION_1: NORMAL
MDC_IDC_SET_LEADCHNL_RA_PACING_CAPTURE_MODE: NORMAL
MDC_IDC_SET_LEADCHNL_RA_PACING_CATHODE_ELECTRODE_1: NORMAL
MDC_IDC_SET_LEADCHNL_RA_PACING_CATHODE_LOCATION_1: NORMAL
MDC_IDC_SET_LEADCHNL_RA_PACING_POLARITY: NORMAL
MDC_IDC_SET_LEADCHNL_RA_PACING_PULSEWIDTH: 0.4 MS
MDC_IDC_SET_LEADCHNL_RA_SENSING_ANODE_ELECTRODE_1: NORMAL
MDC_IDC_SET_LEADCHNL_RA_SENSING_ANODE_LOCATION_1: NORMAL
MDC_IDC_SET_LEADCHNL_RA_SENSING_CATHODE_ELECTRODE_1: NORMAL
MDC_IDC_SET_LEADCHNL_RA_SENSING_CATHODE_LOCATION_1: NORMAL
MDC_IDC_SET_LEADCHNL_RA_SENSING_POLARITY: NORMAL
MDC_IDC_SET_LEADCHNL_RA_SENSING_SENSITIVITY: 0.15 MV
MDC_IDC_SET_LEADCHNL_RA_SENSING_SENSITIVITY: 4 MV
MDC_IDC_SET_LEADCHNL_RV_PACING_AMPLITUDE: 1.5 V
MDC_IDC_SET_LEADCHNL_RV_PACING_AMPLITUDE: NORMAL
MDC_IDC_SET_LEADCHNL_RV_PACING_AMPLITUDE: NORMAL
MDC_IDC_SET_LEADCHNL_RV_PACING_ANODE_ELECTRODE_1: NORMAL
MDC_IDC_SET_LEADCHNL_RV_PACING_ANODE_LOCATION_1: NORMAL
MDC_IDC_SET_LEADCHNL_RV_PACING_CAPTURE_MODE: NORMAL
MDC_IDC_SET_LEADCHNL_RV_PACING_CATHODE_ELECTRODE_1: NORMAL
MDC_IDC_SET_LEADCHNL_RV_PACING_CATHODE_LOCATION_1: NORMAL
MDC_IDC_SET_LEADCHNL_RV_PACING_POLARITY: NORMAL
MDC_IDC_SET_LEADCHNL_RV_PACING_PULSEWIDTH: 0.4 MS
MDC_IDC_SET_LEADCHNL_RV_SENSING_ANODE_ELECTRODE_1: NORMAL
MDC_IDC_SET_LEADCHNL_RV_SENSING_ANODE_LOCATION_1: NORMAL
MDC_IDC_SET_LEADCHNL_RV_SENSING_CATHODE_ELECTRODE_1: NORMAL
MDC_IDC_SET_LEADCHNL_RV_SENSING_CATHODE_LOCATION_1: NORMAL
MDC_IDC_SET_LEADCHNL_RV_SENSING_POLARITY: NORMAL
MDC_IDC_SET_LEADCHNL_RV_SENSING_SENSITIVITY: 0.45 MV
MDC_IDC_SET_ZONE_DETECTION_BEATS_DENOMINATOR: 16 {BEATS}
MDC_IDC_SET_ZONE_DETECTION_BEATS_DENOMINATOR: 32 {BEATS}
MDC_IDC_SET_ZONE_DETECTION_BEATS_DENOMINATOR: 40 {BEATS}
MDC_IDC_SET_ZONE_DETECTION_BEATS_NUMERATOR: 16 {BEATS}
MDC_IDC_SET_ZONE_DETECTION_BEATS_NUMERATOR: 30 {BEATS}
MDC_IDC_SET_ZONE_DETECTION_BEATS_NUMERATOR: 32 {BEATS}
MDC_IDC_SET_ZONE_DETECTION_INTERVAL: 200 MS
MDC_IDC_SET_ZONE_DETECTION_INTERVAL: 240 MS
MDC_IDC_SET_ZONE_DETECTION_INTERVAL: 320 MS
MDC_IDC_SET_ZONE_DETECTION_INTERVAL: 350 MS
MDC_IDC_SET_ZONE_DETECTION_INTERVAL: 360 MS
MDC_IDC_SET_ZONE_STATUS: NORMAL
MDC_IDC_SET_ZONE_TYPE: NORMAL
MDC_IDC_SET_ZONE_VENDOR_TYPE: NORMAL
MDC_IDC_STAT_AT_BURDEN_PERCENT: 18.08 %
MDC_IDC_STAT_AT_BURDEN_PERCENT: 2.23 %
MDC_IDC_STAT_AT_BURDEN_PERCENT: 2.93 %
MDC_IDC_STAT_AT_BURDEN_PERCENT: 4.21 %
MDC_IDC_STAT_AT_BURDEN_PERCENT: 6.58 %
MDC_IDC_STAT_AT_DTM_END: NORMAL
MDC_IDC_STAT_AT_DTM_START: NORMAL
MDC_IDC_STAT_AT_MODE_SW_COUNT: 7593
MDC_IDC_STAT_BRADY_AP_VP_PERCENT: 26.25 %
MDC_IDC_STAT_BRADY_AP_VP_PERCENT: 29.88 %
MDC_IDC_STAT_BRADY_AP_VP_PERCENT: 38.13 %
MDC_IDC_STAT_BRADY_AP_VS_PERCENT: 26.2 %
MDC_IDC_STAT_BRADY_AP_VS_PERCENT: 26.93 %
MDC_IDC_STAT_BRADY_AP_VS_PERCENT: 39.3 %
MDC_IDC_STAT_BRADY_AS_VP_PERCENT: 14.12 %
MDC_IDC_STAT_BRADY_AS_VP_PERCENT: 14.7 %
MDC_IDC_STAT_BRADY_AS_VP_PERCENT: 17.02 %
MDC_IDC_STAT_BRADY_AS_VS_PERCENT: 28.49 %
MDC_IDC_STAT_BRADY_AS_VS_PERCENT: 33.44 %
MDC_IDC_STAT_BRADY_AS_VS_PERCENT: 5.55 %
MDC_IDC_STAT_BRADY_DTM_END: NORMAL
MDC_IDC_STAT_BRADY_DTM_START: NORMAL
MDC_IDC_STAT_BRADY_RA_PERCENT_PACED: 59.79 %
MDC_IDC_STAT_BRADY_RA_PERCENT_PACED: 63.98 %
MDC_IDC_STAT_BRADY_RA_PERCENT_PACED: 77.01 %
MDC_IDC_STAT_BRADY_RV_PERCENT_PACED: 40.55 %
MDC_IDC_STAT_BRADY_RV_PERCENT_PACED: 44.73 %
MDC_IDC_STAT_BRADY_RV_PERCENT_PACED: 48.14 %
MDC_IDC_STAT_BRADY_RV_PERCENT_PACED: 55.37 %
MDC_IDC_STAT_BRADY_RV_PERCENT_PACED: 80.41 %
MDC_IDC_STAT_CRT_DTM_END: NORMAL
MDC_IDC_STAT_CRT_DTM_START: NORMAL
MDC_IDC_STAT_EPISODE_RECENT_COUNT: 0
MDC_IDC_STAT_EPISODE_RECENT_COUNT: 2
MDC_IDC_STAT_EPISODE_RECENT_COUNT: 21
MDC_IDC_STAT_EPISODE_RECENT_COUNT: 21
MDC_IDC_STAT_EPISODE_RECENT_COUNT: 2824
MDC_IDC_STAT_EPISODE_RECENT_COUNT: 3077
MDC_IDC_STAT_EPISODE_RECENT_COUNT: 3476
MDC_IDC_STAT_EPISODE_RECENT_COUNT: 4117
MDC_IDC_STAT_EPISODE_RECENT_COUNT: 489
MDC_IDC_STAT_EPISODE_RECENT_COUNT: 6
MDC_IDC_STAT_EPISODE_RECENT_COUNT: 851
MDC_IDC_STAT_EPISODE_RECENT_COUNT_DTM_END: NORMAL
MDC_IDC_STAT_EPISODE_RECENT_COUNT_DTM_START: NORMAL
MDC_IDC_STAT_EPISODE_TOTAL_COUNT: 0
MDC_IDC_STAT_EPISODE_TOTAL_COUNT: 1042
MDC_IDC_STAT_EPISODE_TOTAL_COUNT: 21
MDC_IDC_STAT_EPISODE_TOTAL_COUNT: 3494
MDC_IDC_STAT_EPISODE_TOTAL_COUNT: 3494
MDC_IDC_STAT_EPISODE_TOTAL_COUNT: 3496
MDC_IDC_STAT_EPISODE_TOTAL_COUNT: 3496
MDC_IDC_STAT_EPISODE_TOTAL_COUNT: 4119
MDC_IDC_STAT_EPISODE_TOTAL_COUNT: 4128
MDC_IDC_STAT_EPISODE_TOTAL_COUNT: 4134
MDC_IDC_STAT_EPISODE_TOTAL_COUNT: 4134
MDC_IDC_STAT_EPISODE_TOTAL_COUNT: 670
MDC_IDC_STAT_EPISODE_TOTAL_COUNT_DTM_END: NORMAL
MDC_IDC_STAT_EPISODE_TOTAL_COUNT_DTM_START: NORMAL
MDC_IDC_STAT_EPISODE_TYPE: NORMAL
MDC_IDC_STAT_TACHYTHERAPY_ATP_DELIVERED_RECENT: 0
MDC_IDC_STAT_TACHYTHERAPY_ATP_DELIVERED_TOTAL: 0
MDC_IDC_STAT_TACHYTHERAPY_RECENT_DTM_END: NORMAL
MDC_IDC_STAT_TACHYTHERAPY_RECENT_DTM_START: NORMAL
MDC_IDC_STAT_TACHYTHERAPY_SHOCKS_ABORTED_RECENT: 0
MDC_IDC_STAT_TACHYTHERAPY_SHOCKS_ABORTED_TOTAL: 0
MDC_IDC_STAT_TACHYTHERAPY_SHOCKS_DELIVERED_RECENT: 0
MDC_IDC_STAT_TACHYTHERAPY_SHOCKS_DELIVERED_TOTAL: 0
MDC_IDC_STAT_TACHYTHERAPY_TOTAL_DTM_END: NORMAL
MDC_IDC_STAT_TACHYTHERAPY_TOTAL_DTM_START: NORMAL
MICROALBUMIN UR-MCNC: 21.7 MG/L
MICROALBUMIN/CREAT UR: 35.28 MG/G CR (ref 0–17)
MONOCYTES # BLD AUTO: 0.7 10E3/UL (ref 0–1.3)
MONOCYTES # BLD AUTO: 1.1 10E3/UL (ref 0–1.3)
MONOCYTES NFR BLD AUTO: 10 %
MONOCYTES NFR BLD AUTO: 11 %
MRSA DNA SPEC QL NAA+PROBE: NEGATIVE
NEUTROPHILS # BLD AUTO: 4.3 10E3/UL (ref 1.6–8.3)
NEUTROPHILS # BLD AUTO: 8.2 10E3/UL (ref 1.6–8.3)
NEUTROPHILS NFR BLD AUTO: 64 %
NEUTROPHILS NFR BLD AUTO: 74 %
NRBC # BLD AUTO: 0 10E3/UL
NRBC # BLD AUTO: 0 10E3/UL
NRBC BLD AUTO-RTO: 0 /100
NRBC BLD AUTO-RTO: 1 /100
NT-PROBNP SERPL-MCNC: 3644 PG/ML (ref 0–1800)
NT-PROBNP SERPL-MCNC: ABNORMAL PG/ML (ref 0–1800)
OXYHGB MFR BLD: 98.4 % (ref 95–96)
OXYHGB MFR BLDV: 68.1 % (ref 70–75)
P AXIS - MUSE: NORMAL DEGREES
P AXIS - MUSE: NORMAL DEGREES
PCO2 BLD: 31 MM HG (ref 35–45)
PCO2 BLDV: 44 MM HG (ref 35–50)
PH BLD: 7.48 [PH] (ref 7.37–7.44)
PH BLDV: 7.41 [PH] (ref 7.35–7.45)
PHOSPHATE SERPL-MCNC: 4.2 MG/DL (ref 2.5–4.5)
PLATELET # BLD AUTO: 123 10E3/UL (ref 150–450)
PLATELET # BLD AUTO: 134 10E3/UL (ref 150–450)
PLATELET # BLD AUTO: 135 10E3/UL (ref 150–450)
PLATELET # BLD AUTO: 139 10E3/UL (ref 150–450)
PLATELET # BLD AUTO: 140 10E3/UL (ref 150–450)
PLATELET # BLD AUTO: 142 10E3/UL (ref 150–450)
PLATELET # BLD AUTO: 144 10E3/UL (ref 150–450)
PLATELET # BLD AUTO: 149 10E3/UL (ref 150–450)
PO2 BLD: 119 MM HG (ref 75–85)
PO2 BLDV: 38 MM HG (ref 25–47)
POTASSIUM SERPL-SCNC: 3.3 MMOL/L (ref 3.4–5.3)
POTASSIUM SERPL-SCNC: 3.4 MMOL/L (ref 3.4–5.3)
POTASSIUM SERPL-SCNC: 3.4 MMOL/L (ref 3.4–5.3)
POTASSIUM SERPL-SCNC: 3.5 MMOL/L (ref 3.4–5.3)
POTASSIUM SERPL-SCNC: 3.6 MMOL/L (ref 3.4–5.3)
POTASSIUM SERPL-SCNC: 3.7 MMOL/L (ref 3.4–5.3)
POTASSIUM SERPL-SCNC: 3.8 MMOL/L (ref 3.4–5.3)
POTASSIUM SERPL-SCNC: 4 MMOL/L (ref 3.4–5.3)
POTASSIUM SERPL-SCNC: 4 MMOL/L (ref 3.4–5.3)
POTASSIUM SERPL-SCNC: 4.3 MMOL/L (ref 3.4–5.3)
POTASSIUM SERPL-SCNC: 4.4 MMOL/L (ref 3.4–5.3)
PR INTERVAL - MUSE: NORMAL MS
PR INTERVAL - MUSE: NORMAL MS
PROCALCITONIN SERPL IA-MCNC: 0.05 NG/ML
PROT SERPL-MCNC: 6.5 G/DL (ref 6.4–8.3)
PROT SERPL-MCNC: 7.2 G/DL (ref 6.4–8.3)
QRS DURATION - MUSE: 114 MS
QRS DURATION - MUSE: 188 MS
QT - MUSE: 476 MS
QT - MUSE: 532 MS
QTC - MUSE: 524 MS
QTC - MUSE: 613 MS
R AXIS - MUSE: 129 DEGREES
R AXIS - MUSE: 189 DEGREES
RBC # BLD AUTO: 3.37 10E6/UL (ref 4.4–5.9)
RBC # BLD AUTO: 3.49 10E6/UL (ref 4.4–5.9)
RBC # BLD AUTO: 3.51 10E6/UL (ref 4.4–5.9)
RBC # BLD AUTO: 3.58 10E6/UL (ref 4.4–5.9)
RBC # BLD AUTO: 3.63 10E6/UL (ref 4.4–5.9)
RBC # BLD AUTO: 3.64 10E6/UL (ref 4.4–5.9)
RBC # BLD AUTO: 3.76 10E6/UL (ref 4.4–5.9)
RBC # BLD AUTO: 3.93 10E6/UL (ref 4.4–5.9)
RSV RNA SPEC QL NAA+PROBE: NOT DETECTED
RSV RNA SPEC QL NAA+PROBE: NOT DETECTED
RV+EV RNA SPEC QL NAA+PROBE: NOT DETECTED
SA TARGET DNA: POSITIVE
SAO2 % BLDV: 69.3 % (ref 70–75)
SARS-COV-2 RNA RESP QL NAA+PROBE: NEGATIVE
SODIUM SERPL-SCNC: 141 MMOL/L (ref 136–145)
SODIUM SERPL-SCNC: 141 MMOL/L (ref 136–145)
SODIUM SERPL-SCNC: 142 MMOL/L (ref 135–145)
SODIUM SERPL-SCNC: 142 MMOL/L (ref 136–145)
SODIUM SERPL-SCNC: 142 MMOL/L (ref 136–145)
SODIUM SERPL-SCNC: 143 MMOL/L (ref 136–145)
SODIUM SERPL-SCNC: 144 MMOL/L (ref 136–145)
SODIUM SERPL-SCNC: 145 MMOL/L (ref 136–145)
SYSTOLIC BLOOD PRESSURE - MUSE: NORMAL MMHG
SYSTOLIC BLOOD PRESSURE - MUSE: NORMAL MMHG
T AXIS - MUSE: 186 DEGREES
T AXIS - MUSE: 95 DEGREES
TEMPERATURE: 37 DEGREES C
TROPONIN T SERPL HS-MCNC: 30 NG/L
TROPONIN T SERPL HS-MCNC: 31 NG/L
TSH SERPL DL<=0.005 MIU/L-ACNC: 2.38 UIU/ML (ref 0.3–4.2)
VENTRICULAR RATE- MUSE: 73 BPM
VENTRICULAR RATE- MUSE: 80 BPM
VIT B12 SERPL-MCNC: 1413 PG/ML (ref 232–1245)
WBC # BLD AUTO: 10.3 10E3/UL (ref 4–11)
WBC # BLD AUTO: 10.9 10E3/UL (ref 4–11)
WBC # BLD AUTO: 12.6 10E3/UL (ref 4–11)
WBC # BLD AUTO: 6.8 10E3/UL (ref 4–11)
WBC # BLD AUTO: 7.1 10E3/UL (ref 4–11)
WBC # BLD AUTO: 9 10E3/UL (ref 4–11)
WBC # BLD AUTO: 9 10E3/UL (ref 4–11)
WBC # BLD AUTO: 9.5 10E3/UL (ref 4–11)

## 2023-01-01 PROCEDURE — 85027 COMPLETE CBC AUTOMATED: CPT | Performed by: INTERNAL MEDICINE

## 2023-01-01 PROCEDURE — 82570 ASSAY OF URINE CREATININE: CPT | Mod: ORL | Performed by: FAMILY MEDICINE

## 2023-01-01 PROCEDURE — 36415 COLL VENOUS BLD VENIPUNCTURE: CPT | Performed by: INTERNAL MEDICINE

## 2023-01-01 PROCEDURE — 83735 ASSAY OF MAGNESIUM: CPT | Performed by: INTERNAL MEDICINE

## 2023-01-01 PROCEDURE — 85610 PROTHROMBIN TIME: CPT | Performed by: INTERNAL MEDICINE

## 2023-01-01 PROCEDURE — 84132 ASSAY OF SERUM POTASSIUM: CPT | Performed by: HOSPITALIST

## 2023-01-01 PROCEDURE — 250N000013 HC RX MED GY IP 250 OP 250 PS 637: Performed by: HOSPITALIST

## 2023-01-01 PROCEDURE — P9047 ALBUMIN (HUMAN), 25%, 50ML: HCPCS | Performed by: INTERNAL MEDICINE

## 2023-01-01 PROCEDURE — 250N000013 HC RX MED GY IP 250 OP 250 PS 637: Performed by: INTERNAL MEDICINE

## 2023-01-01 PROCEDURE — 99232 SBSQ HOSP IP/OBS MODERATE 35: CPT | Performed by: INTERNAL MEDICINE

## 2023-01-01 PROCEDURE — 87641 MR-STAPH DNA AMP PROBE: CPT | Performed by: INTERNAL MEDICINE

## 2023-01-01 PROCEDURE — 97530 THERAPEUTIC ACTIVITIES: CPT | Mod: GP

## 2023-01-01 PROCEDURE — 84484 ASSAY OF TROPONIN QUANT: CPT | Performed by: EMERGENCY MEDICINE

## 2023-01-01 PROCEDURE — 94640 AIRWAY INHALATION TREATMENT: CPT | Mod: 76

## 2023-01-01 PROCEDURE — 5A09357 ASSISTANCE WITH RESPIRATORY VENTILATION, LESS THAN 24 CONSECUTIVE HOURS, CONTINUOUS POSITIVE AIRWAY PRESSURE: ICD-10-PCS | Performed by: INTERNAL MEDICINE

## 2023-01-01 PROCEDURE — 250N000011 HC RX IP 250 OP 636: Performed by: INTERNAL MEDICINE

## 2023-01-01 PROCEDURE — 250N000012 HC RX MED GY IP 250 OP 636 PS 637: Performed by: INTERNAL MEDICINE

## 2023-01-01 PROCEDURE — 97110 THERAPEUTIC EXERCISES: CPT | Mod: GP | Performed by: PHYSICAL THERAPIST

## 2023-01-01 PROCEDURE — 250N000009 HC RX 250: Performed by: INTERNAL MEDICINE

## 2023-01-01 PROCEDURE — 97116 GAIT TRAINING THERAPY: CPT | Mod: GP

## 2023-01-01 PROCEDURE — 94640 AIRWAY INHALATION TREATMENT: CPT

## 2023-01-01 PROCEDURE — 84132 ASSAY OF SERUM POTASSIUM: CPT | Performed by: INTERNAL MEDICINE

## 2023-01-01 PROCEDURE — 80048 BASIC METABOLIC PNL TOTAL CA: CPT | Performed by: NURSE PRACTITIONER

## 2023-01-01 PROCEDURE — 99214 OFFICE O/P EST MOD 30 MIN: CPT | Performed by: NURSE PRACTITIONER

## 2023-01-01 PROCEDURE — 99255 IP/OBS CONSLTJ NEW/EST HI 80: CPT | Performed by: INTERNAL MEDICINE

## 2023-01-01 PROCEDURE — 94799 UNLISTED PULMONARY SVC/PX: CPT

## 2023-01-01 PROCEDURE — 82310 ASSAY OF CALCIUM: CPT | Performed by: EMERGENCY MEDICINE

## 2023-01-01 PROCEDURE — 99233 SBSQ HOSP IP/OBS HIGH 50: CPT | Performed by: INTERNAL MEDICINE

## 2023-01-01 PROCEDURE — 97110 THERAPEUTIC EXERCISES: CPT | Mod: GO

## 2023-01-01 PROCEDURE — 999N000157 HC STATISTIC RCP TIME EA 10 MIN

## 2023-01-01 PROCEDURE — 97535 SELF CARE MNGMENT TRAINING: CPT | Mod: GO

## 2023-01-01 PROCEDURE — 210N000001 HC R&B IMCU HEART CARE

## 2023-01-01 PROCEDURE — 99207 PR NO CHARGE LOS: CPT | Performed by: INTERNAL MEDICINE

## 2023-01-01 PROCEDURE — 99232 SBSQ HOSP IP/OBS MODERATE 35: CPT | Performed by: NURSE PRACTITIONER

## 2023-01-01 PROCEDURE — 250N000013 HC RX MED GY IP 250 OP 250 PS 637: Performed by: STUDENT IN AN ORGANIZED HEALTH CARE EDUCATION/TRAINING PROGRAM

## 2023-01-01 PROCEDURE — 82805 BLOOD GASES W/O2 SATURATION: CPT | Performed by: EMERGENCY MEDICINE

## 2023-01-01 PROCEDURE — C9803 HOPD COVID-19 SPEC COLLECT: HCPCS

## 2023-01-01 PROCEDURE — 83880 ASSAY OF NATRIURETIC PEPTIDE: CPT | Performed by: EMERGENCY MEDICINE

## 2023-01-01 PROCEDURE — 80048 BASIC METABOLIC PNL TOTAL CA: CPT | Performed by: INTERNAL MEDICINE

## 2023-01-01 PROCEDURE — 999N000156 HC STATISTIC RCP CONSULT EA 30 MIN

## 2023-01-01 PROCEDURE — 82310 ASSAY OF CALCIUM: CPT | Performed by: INTERNAL MEDICINE

## 2023-01-01 PROCEDURE — 93010 ELECTROCARDIOGRAM REPORT: CPT | Performed by: INTERNAL MEDICINE

## 2023-01-01 PROCEDURE — 82805 BLOOD GASES W/O2 SATURATION: CPT | Performed by: HOSPITALIST

## 2023-01-01 PROCEDURE — 97530 THERAPEUTIC ACTIVITIES: CPT | Mod: GO

## 2023-01-01 PROCEDURE — 93005 ELECTROCARDIOGRAM TRACING: CPT

## 2023-01-01 PROCEDURE — 99215 OFFICE O/P EST HI 40 MIN: CPT | Performed by: NURSE PRACTITIONER

## 2023-01-01 PROCEDURE — 87486 CHLMYD PNEUM DNA AMP PROBE: CPT | Performed by: INTERNAL MEDICINE

## 2023-01-01 PROCEDURE — P9045 ALBUMIN (HUMAN), 5%, 250 ML: HCPCS | Performed by: INTERNAL MEDICINE

## 2023-01-01 PROCEDURE — 99233 SBSQ HOSP IP/OBS HIGH 50: CPT | Mod: 25 | Performed by: INTERNAL MEDICINE

## 2023-01-01 PROCEDURE — 87040 BLOOD CULTURE FOR BACTERIA: CPT | Performed by: INTERNAL MEDICINE

## 2023-01-01 PROCEDURE — 250N000011 HC RX IP 250 OP 636: Performed by: EMERGENCY MEDICINE

## 2023-01-01 PROCEDURE — 78580 LUNG PERFUSION IMAGING: CPT

## 2023-01-01 PROCEDURE — G0463 HOSPITAL OUTPT CLINIC VISIT: HCPCS

## 2023-01-01 PROCEDURE — 83735 ASSAY OF MAGNESIUM: CPT | Performed by: NURSE PRACTITIONER

## 2023-01-01 PROCEDURE — G0009 ADMIN PNEUMOCOCCAL VACCINE: HCPCS | Performed by: INTERNAL MEDICINE

## 2023-01-01 PROCEDURE — 93283 PRGRMG EVAL IMPLANTABLE DFB: CPT | Performed by: INTERNAL MEDICINE

## 2023-01-01 PROCEDURE — 255N000002 HC RX 255 OP 636: Performed by: INTERNAL MEDICINE

## 2023-01-01 PROCEDURE — 93005 ELECTROCARDIOGRAM TRACING: CPT | Performed by: INTERNAL MEDICINE

## 2023-01-01 PROCEDURE — 87635 SARS-COV-2 COVID-19 AMP PRB: CPT | Performed by: INTERNAL MEDICINE

## 2023-01-01 PROCEDURE — 71250 CT THORAX DX C-: CPT

## 2023-01-01 PROCEDURE — 85025 COMPLETE CBC W/AUTO DIFF WBC: CPT | Performed by: INTERNAL MEDICINE

## 2023-01-01 PROCEDURE — 93005 ELECTROCARDIOGRAM TRACING: CPT | Performed by: EMERGENCY MEDICINE

## 2023-01-01 PROCEDURE — 84484 ASSAY OF TROPONIN QUANT: CPT | Performed by: INTERNAL MEDICINE

## 2023-01-01 PROCEDURE — 97110 THERAPEUTIC EXERCISES: CPT | Mod: GP

## 2023-01-01 PROCEDURE — 93308 TTE F-UP OR LMTD: CPT

## 2023-01-01 PROCEDURE — 36415 COLL VENOUS BLD VENIPUNCTURE: CPT | Performed by: NURSE PRACTITIONER

## 2023-01-01 PROCEDURE — 80053 COMPREHEN METABOLIC PANEL: CPT | Performed by: INTERNAL MEDICINE

## 2023-01-01 PROCEDURE — 82746 ASSAY OF FOLIC ACID SERUM: CPT | Performed by: INTERNAL MEDICINE

## 2023-01-01 PROCEDURE — 83880 ASSAY OF NATRIURETIC PEPTIDE: CPT | Performed by: INTERNAL MEDICINE

## 2023-01-01 PROCEDURE — 36415 COLL VENOUS BLD VENIPUNCTURE: CPT | Performed by: HOSPITALIST

## 2023-01-01 PROCEDURE — 85730 THROMBOPLASTIN TIME PARTIAL: CPT | Performed by: INTERNAL MEDICINE

## 2023-01-01 PROCEDURE — 999N000215 HC STATISTIC HFNC ADULT NON-CPAP

## 2023-01-01 PROCEDURE — 99233 SBSQ HOSP IP/OBS HIGH 50: CPT | Performed by: CLINICAL NURSE SPECIALIST

## 2023-01-01 PROCEDURE — 82248 BILIRUBIN DIRECT: CPT | Performed by: INTERNAL MEDICINE

## 2023-01-01 PROCEDURE — 82565 ASSAY OF CREATININE: CPT | Performed by: INTERNAL MEDICINE

## 2023-01-01 PROCEDURE — 999N000123 HC STATISTIC OXYGEN O2DAILY TECH TIME

## 2023-01-01 PROCEDURE — 93308 TTE F-UP OR LMTD: CPT | Mod: 26 | Performed by: INTERNAL MEDICINE

## 2023-01-01 PROCEDURE — 99239 HOSP IP/OBS DSCHRG MGMT >30: CPT | Performed by: INTERNAL MEDICINE

## 2023-01-01 PROCEDURE — 71046 X-RAY EXAM CHEST 2 VIEWS: CPT

## 2023-01-01 PROCEDURE — 85025 COMPLETE CBC W/AUTO DIFF WBC: CPT | Performed by: EMERGENCY MEDICINE

## 2023-01-01 PROCEDURE — 93296 REM INTERROG EVL PM/IDS: CPT | Performed by: INTERNAL MEDICINE

## 2023-01-01 PROCEDURE — 36600 WITHDRAWAL OF ARTERIAL BLOOD: CPT

## 2023-01-01 PROCEDURE — 97166 OT EVAL MOD COMPLEX 45 MIN: CPT | Mod: GO

## 2023-01-01 PROCEDURE — 99223 1ST HOSP IP/OBS HIGH 75: CPT | Performed by: INTERNAL MEDICINE

## 2023-01-01 PROCEDURE — 84145 PROCALCITONIN (PCT): CPT | Performed by: INTERNAL MEDICINE

## 2023-01-01 PROCEDURE — 272N000202 HC AEROBIKA WITH MANOMETER

## 2023-01-01 PROCEDURE — 99291 CRITICAL CARE FIRST HOUR: CPT | Mod: 25

## 2023-01-01 PROCEDURE — 93325 DOPPLER ECHO COLOR FLOW MAPG: CPT | Mod: 26 | Performed by: INTERNAL MEDICINE

## 2023-01-01 PROCEDURE — 99204 OFFICE O/P NEW MOD 45 MIN: CPT | Performed by: INTERNAL MEDICINE

## 2023-01-01 PROCEDURE — 83036 HEMOGLOBIN GLYCOSYLATED A1C: CPT | Mod: ORL | Performed by: FAMILY MEDICINE

## 2023-01-01 PROCEDURE — 343N000001 HC RX 343: Performed by: INTERNAL MEDICINE

## 2023-01-01 PROCEDURE — 93325 DOPPLER ECHO COLOR FLOW MAPG: CPT

## 2023-01-01 PROCEDURE — 90677 PCV20 VACCINE IM: CPT | Performed by: INTERNAL MEDICINE

## 2023-01-01 PROCEDURE — 250N000009 HC RX 250: Performed by: EMERGENCY MEDICINE

## 2023-01-01 PROCEDURE — 96374 THER/PROPH/DIAG INJ IV PUSH: CPT

## 2023-01-01 PROCEDURE — 93306 TTE W/DOPPLER COMPLETE: CPT | Mod: 26 | Performed by: INTERNAL MEDICINE

## 2023-01-01 PROCEDURE — 71045 X-RAY EXAM CHEST 1 VIEW: CPT

## 2023-01-01 PROCEDURE — 97162 PT EVAL MOD COMPLEX 30 MIN: CPT | Mod: GP

## 2023-01-01 PROCEDURE — 99214 OFFICE O/P EST MOD 30 MIN: CPT | Mod: 25 | Performed by: INTERNAL MEDICINE

## 2023-01-01 PROCEDURE — A9540 TC99M MAA: HCPCS | Performed by: INTERNAL MEDICINE

## 2023-01-01 PROCEDURE — 93321 DOPPLER ECHO F-UP/LMTD STD: CPT | Mod: 26 | Performed by: INTERNAL MEDICINE

## 2023-01-01 PROCEDURE — 85379 FIBRIN DEGRADATION QUANT: CPT | Performed by: INTERNAL MEDICINE

## 2023-01-01 PROCEDURE — 80048 BASIC METABOLIC PNL TOTAL CA: CPT | Mod: ORL | Performed by: FAMILY MEDICINE

## 2023-01-01 PROCEDURE — 80069 RENAL FUNCTION PANEL: CPT | Performed by: INTERNAL MEDICINE

## 2023-01-01 PROCEDURE — 84443 ASSAY THYROID STIM HORMONE: CPT | Performed by: INTERNAL MEDICINE

## 2023-01-01 PROCEDURE — 99232 SBSQ HOSP IP/OBS MODERATE 35: CPT | Performed by: CLINICAL NURSE SPECIALIST

## 2023-01-01 PROCEDURE — 36415 COLL VENOUS BLD VENIPUNCTURE: CPT | Performed by: EMERGENCY MEDICINE

## 2023-01-01 PROCEDURE — 82607 VITAMIN B-12: CPT | Performed by: INTERNAL MEDICINE

## 2023-01-01 PROCEDURE — 90662 IIV NO PRSV INCREASED AG IM: CPT | Performed by: INTERNAL MEDICINE

## 2023-01-01 PROCEDURE — 94660 CPAP INITIATION&MGMT: CPT

## 2023-01-01 PROCEDURE — 93295 DEV INTERROG REMOTE 1/2/MLT: CPT | Performed by: INTERNAL MEDICINE

## 2023-01-01 PROCEDURE — 99215 OFFICE O/P EST HI 40 MIN: CPT | Performed by: GENERAL ACUTE CARE HOSPITAL

## 2023-01-01 PROCEDURE — 97116 GAIT TRAINING THERAPY: CPT | Mod: GP | Performed by: PHYSICAL THERAPIST

## 2023-01-01 PROCEDURE — 85384 FIBRINOGEN ACTIVITY: CPT | Performed by: INTERNAL MEDICINE

## 2023-01-01 PROCEDURE — G0008 ADMIN INFLUENZA VIRUS VAC: HCPCS | Performed by: INTERNAL MEDICINE

## 2023-01-01 PROCEDURE — 258N000003 HC RX IP 258 OP 636: Performed by: INTERNAL MEDICINE

## 2023-01-01 RX ORDER — ALBUTEROL SULFATE 90 UG/1
2 AEROSOL, METERED RESPIRATORY (INHALATION) EVERY 4 HOURS PRN
Status: DISCONTINUED | OUTPATIENT
Start: 2023-01-01 | End: 2023-01-01 | Stop reason: HOSPADM

## 2023-01-01 RX ORDER — FLUTICASONE FUROATE AND VILANTEROL 200; 25 UG/1; UG/1
1 POWDER RESPIRATORY (INHALATION) DAILY
Status: DISCONTINUED | OUTPATIENT
Start: 2023-01-01 | End: 2023-01-01 | Stop reason: HOSPADM

## 2023-01-01 RX ORDER — METHYLPREDNISOLONE SODIUM SUCCINATE 40 MG/ML
40 INJECTION, POWDER, LYOPHILIZED, FOR SOLUTION INTRAMUSCULAR; INTRAVENOUS ONCE
Status: COMPLETED | OUTPATIENT
Start: 2023-01-01 | End: 2023-01-01

## 2023-01-01 RX ORDER — SOTALOL HYDROCHLORIDE 120 MG/1
120 TABLET ORAL EVERY 24 HOURS
Qty: 90 TABLET | Refills: 3 | Status: SHIPPED | OUTPATIENT
Start: 2023-01-01 | End: 2023-01-01

## 2023-01-01 RX ORDER — AMOXICILLIN 250 MG
2 CAPSULE ORAL 2 TIMES DAILY
Status: DISCONTINUED | OUTPATIENT
Start: 2023-01-01 | End: 2023-01-01 | Stop reason: HOSPADM

## 2023-01-01 RX ORDER — FUROSEMIDE 20 MG
80 TABLET ORAL
Status: DISCONTINUED | OUTPATIENT
Start: 2023-01-01 | End: 2023-01-01

## 2023-01-01 RX ORDER — BUMETANIDE 0.25 MG/ML
3 INJECTION INTRAMUSCULAR; INTRAVENOUS ONCE
Status: COMPLETED | OUTPATIENT
Start: 2023-01-01 | End: 2023-01-01

## 2023-01-01 RX ORDER — CEFTRIAXONE 1 G/1
1 INJECTION, POWDER, FOR SOLUTION INTRAMUSCULAR; INTRAVENOUS EVERY 24 HOURS
Status: DISCONTINUED | OUTPATIENT
Start: 2023-01-01 | End: 2023-01-01

## 2023-01-01 RX ORDER — ALBUMIN (HUMAN) 12.5 G/50ML
50 SOLUTION INTRAVENOUS ONCE
Status: COMPLETED | OUTPATIENT
Start: 2023-01-01 | End: 2023-01-01

## 2023-01-01 RX ORDER — WARFARIN SODIUM 1 MG/1
1 TABLET ORAL
Status: COMPLETED | OUTPATIENT
Start: 2023-01-01 | End: 2023-01-01

## 2023-01-01 RX ORDER — POTASSIUM CHLORIDE 1500 MG/1
20 TABLET, EXTENDED RELEASE ORAL ONCE
Status: COMPLETED | OUTPATIENT
Start: 2023-01-01 | End: 2023-01-01

## 2023-01-01 RX ORDER — DOXYCYCLINE 100 MG/10ML
100 INJECTION, POWDER, LYOPHILIZED, FOR SOLUTION INTRAVENOUS EVERY 12 HOURS
Status: DISCONTINUED | OUTPATIENT
Start: 2023-01-01 | End: 2023-01-01

## 2023-01-01 RX ORDER — FUROSEMIDE 10 MG/ML
40 INJECTION INTRAMUSCULAR; INTRAVENOUS ONCE
Status: DISCONTINUED | OUTPATIENT
Start: 2023-01-01 | End: 2023-01-01

## 2023-01-01 RX ORDER — FUROSEMIDE 10 MG/ML
80 INJECTION INTRAMUSCULAR; INTRAVENOUS EVERY 8 HOURS
Status: DISCONTINUED | OUTPATIENT
Start: 2023-01-01 | End: 2023-01-01

## 2023-01-01 RX ORDER — POTASSIUM CHLORIDE 1.5 G/1.58G
20 POWDER, FOR SOLUTION ORAL ONCE
Status: COMPLETED | OUTPATIENT
Start: 2023-01-01 | End: 2023-01-01

## 2023-01-01 RX ORDER — ECHINACEA PURPUREA EXTRACT 125 MG
2 TABLET ORAL 4 TIMES DAILY PRN
COMMUNITY
Start: 2023-01-01

## 2023-01-01 RX ORDER — METHYLPREDNISOLONE SODIUM SUCCINATE 125 MG/2ML
125 INJECTION, POWDER, LYOPHILIZED, FOR SOLUTION INTRAMUSCULAR; INTRAVENOUS ONCE
Status: COMPLETED | OUTPATIENT
Start: 2023-01-01 | End: 2023-01-01

## 2023-01-01 RX ORDER — PIPERACILLIN SODIUM, TAZOBACTAM SODIUM 3; .375 G/15ML; G/15ML
3.38 INJECTION, POWDER, LYOPHILIZED, FOR SOLUTION INTRAVENOUS EVERY 8 HOURS
Status: DISCONTINUED | OUTPATIENT
Start: 2023-01-01 | End: 2023-01-01 | Stop reason: DRUGHIGH

## 2023-01-01 RX ORDER — VANCOMYCIN HYDROCHLORIDE 1 G/200ML
1000 INJECTION, SOLUTION INTRAVENOUS
Status: DISCONTINUED | OUTPATIENT
Start: 2023-01-01 | End: 2023-01-01

## 2023-01-01 RX ORDER — METHYLPREDNISOLONE SODIUM SUCCINATE 40 MG/ML
40 INJECTION, POWDER, LYOPHILIZED, FOR SOLUTION INTRAMUSCULAR; INTRAVENOUS EVERY 6 HOURS
Status: DISCONTINUED | OUTPATIENT
Start: 2023-01-01 | End: 2023-01-01

## 2023-01-01 RX ORDER — LOSARTAN POTASSIUM 25 MG/1
25 TABLET ORAL DAILY
Status: ON HOLD | COMMUNITY
End: 2023-01-01

## 2023-01-01 RX ORDER — PREDNISONE 20 MG/1
20 TABLET ORAL DAILY
Status: COMPLETED | OUTPATIENT
Start: 2023-01-01 | End: 2023-01-01

## 2023-01-01 RX ORDER — DOXYCYCLINE 100 MG/1
100 CAPSULE ORAL EVERY 12 HOURS SCHEDULED
Status: COMPLETED | OUTPATIENT
Start: 2023-01-01 | End: 2023-01-01

## 2023-01-01 RX ORDER — LEVALBUTEROL INHALATION SOLUTION 1.25 MG/3ML
1.25 SOLUTION RESPIRATORY (INHALATION)
Status: DISCONTINUED | OUTPATIENT
Start: 2023-01-01 | End: 2023-01-01

## 2023-01-01 RX ORDER — POTASSIUM CHLORIDE 1500 MG/1
40 TABLET, EXTENDED RELEASE ORAL ONCE
Status: COMPLETED | OUTPATIENT
Start: 2023-01-01 | End: 2023-01-01

## 2023-01-01 RX ORDER — OXYMETAZOLINE HYDROCHLORIDE 0.05 G/100ML
2 SPRAY NASAL 2 TIMES DAILY
Status: COMPLETED | OUTPATIENT
Start: 2023-01-01 | End: 2023-01-01

## 2023-01-01 RX ORDER — BUMETANIDE 0.25 MG/ML
2 INJECTION INTRAMUSCULAR; INTRAVENOUS EVERY 8 HOURS
Status: DISCONTINUED | OUTPATIENT
Start: 2023-01-01 | End: 2023-01-01

## 2023-01-01 RX ORDER — SOTALOL HYDROCHLORIDE 120 MG/1
120 TABLET ORAL EVERY 24 HOURS
Status: DISCONTINUED | OUTPATIENT
Start: 2023-01-01 | End: 2023-01-01 | Stop reason: HOSPADM

## 2023-01-01 RX ORDER — IPRATROPIUM BROMIDE AND ALBUTEROL SULFATE 2.5; .5 MG/3ML; MG/3ML
3 SOLUTION RESPIRATORY (INHALATION) ONCE
Status: COMPLETED | OUTPATIENT
Start: 2023-01-01 | End: 2023-01-01

## 2023-01-01 RX ORDER — PIPERACILLIN SODIUM, TAZOBACTAM SODIUM 3; .375 G/15ML; G/15ML
3.38 INJECTION, POWDER, LYOPHILIZED, FOR SOLUTION INTRAVENOUS ONCE
Status: COMPLETED | OUTPATIENT
Start: 2023-01-01 | End: 2023-01-01

## 2023-01-01 RX ORDER — WARFARIN SODIUM 2.5 MG/1
2.5 TABLET ORAL
Status: COMPLETED | OUTPATIENT
Start: 2023-01-01 | End: 2023-01-01

## 2023-01-01 RX ORDER — SOTALOL HYDROCHLORIDE 80 MG/1
160 TABLET ORAL EVERY 12 HOURS SCHEDULED
Status: DISCONTINUED | OUTPATIENT
Start: 2023-01-01 | End: 2023-01-01

## 2023-01-01 RX ORDER — PIPERACILLIN SODIUM, TAZOBACTAM SODIUM 3; .375 G/15ML; G/15ML
3.38 INJECTION, POWDER, LYOPHILIZED, FOR SOLUTION INTRAVENOUS EVERY 8 HOURS
Status: DISCONTINUED | OUTPATIENT
Start: 2023-01-01 | End: 2023-01-01

## 2023-01-01 RX ORDER — GUAIFENESIN 600 MG/1
600 TABLET, EXTENDED RELEASE ORAL 2 TIMES DAILY
Status: DISCONTINUED | OUTPATIENT
Start: 2023-01-01 | End: 2023-01-01 | Stop reason: HOSPADM

## 2023-01-01 RX ORDER — PREDNISONE 20 MG/1
40 TABLET ORAL DAILY
Status: DISCONTINUED | OUTPATIENT
Start: 2023-01-01 | End: 2023-01-01

## 2023-01-01 RX ORDER — METHYLPREDNISOLONE SODIUM SUCCINATE 40 MG/ML
40 INJECTION, POWDER, LYOPHILIZED, FOR SOLUTION INTRAMUSCULAR; INTRAVENOUS EVERY 12 HOURS
Status: DISCONTINUED | OUTPATIENT
Start: 2023-01-01 | End: 2023-01-01

## 2023-01-01 RX ORDER — ACETAMINOPHEN 325 MG/1
650 TABLET ORAL EVERY 4 HOURS PRN
Status: DISCONTINUED | OUTPATIENT
Start: 2023-01-01 | End: 2023-01-01 | Stop reason: HOSPADM

## 2023-01-01 RX ORDER — BUMETANIDE 2 MG/1
2 TABLET ORAL
Status: DISCONTINUED | OUTPATIENT
Start: 2023-01-01 | End: 2023-01-01

## 2023-01-01 RX ORDER — METOPROLOL SUCCINATE 25 MG/1
25 TABLET, EXTENDED RELEASE ORAL DAILY
Qty: 30 TABLET | Refills: 11 | Status: SHIPPED | OUTPATIENT
Start: 2023-01-01

## 2023-01-01 RX ORDER — BUMETANIDE 2 MG/1
2 TABLET ORAL
Qty: 180 TABLET | Refills: 3 | Status: SHIPPED | OUTPATIENT
Start: 2023-01-01

## 2023-01-01 RX ORDER — POLYETHYLENE GLYCOL 1450
POWDER (GRAM) MISCELLANEOUS
COMMUNITY
Start: 2023-01-01 | End: 2023-01-01

## 2023-01-01 RX ORDER — MAGNESIUM 64 MG (MAGNESIUM CHLORIDE) TABLET,DELAYED RELEASE
Qty: 180 TABLET | Refills: 1 | Status: SHIPPED | OUTPATIENT
Start: 2023-01-01

## 2023-01-01 RX ORDER — IPRATROPIUM BROMIDE AND ALBUTEROL SULFATE 2.5; .5 MG/3ML; MG/3ML
3 SOLUTION RESPIRATORY (INHALATION) ONCE
Status: DISCONTINUED | OUTPATIENT
Start: 2023-01-01 | End: 2023-01-01

## 2023-01-01 RX ORDER — PREDNISONE 5 MG/1
10 TABLET ORAL DAILY
Status: COMPLETED | OUTPATIENT
Start: 2023-01-01 | End: 2023-01-01

## 2023-01-01 RX ORDER — LOSARTAN POTASSIUM 25 MG/1
25 TABLET ORAL DAILY
Status: DISCONTINUED | OUTPATIENT
Start: 2023-01-01 | End: 2023-01-01 | Stop reason: HOSPADM

## 2023-01-01 RX ORDER — ONDANSETRON 2 MG/ML
4 INJECTION INTRAMUSCULAR; INTRAVENOUS EVERY 6 HOURS PRN
Status: DISCONTINUED | OUTPATIENT
Start: 2023-01-01 | End: 2023-01-01 | Stop reason: HOSPADM

## 2023-01-01 RX ORDER — POTASSIUM CHLORIDE 1.5 G/1.58G
40 POWDER, FOR SOLUTION ORAL ONCE
Status: COMPLETED | OUTPATIENT
Start: 2023-01-01 | End: 2023-01-01

## 2023-01-01 RX ORDER — WARFARIN SODIUM 2.5 MG/1
TABLET ORAL
Qty: 71 TABLET | Refills: 1 | Status: SHIPPED | OUTPATIENT
Start: 2023-01-01 | End: 2023-01-01

## 2023-01-01 RX ORDER — NYSTATIN 100000/ML
1000000 SUSPENSION, ORAL (FINAL DOSE FORM) ORAL 4 TIMES DAILY
Status: COMPLETED | OUTPATIENT
Start: 2023-01-01 | End: 2023-01-01

## 2023-01-01 RX ORDER — WARFARIN SODIUM 2.5 MG/1
2.5 TABLET ORAL DAILY
Qty: 90 TABLET | Refills: 1 | Status: SHIPPED | OUTPATIENT
Start: 2023-01-01 | End: 2024-01-01

## 2023-01-01 RX ORDER — TIOTROPIUM BROMIDE 18 UG/1
18 CAPSULE ORAL; RESPIRATORY (INHALATION) DAILY
Qty: 30 CAPSULE | Refills: 11 | Status: SHIPPED | OUTPATIENT
Start: 2023-01-01

## 2023-01-01 RX ORDER — SOTALOL HYDROCHLORIDE 120 MG/1
120 TABLET ORAL EVERY 24 HOURS
Qty: 90 TABLET | Refills: 0 | Status: SHIPPED | OUTPATIENT
Start: 2023-01-01 | End: 2023-01-01

## 2023-01-01 RX ORDER — CEFAZOLIN SODIUM 1 G/50ML
2000 SOLUTION INTRAVENOUS ONCE
Status: COMPLETED | OUTPATIENT
Start: 2023-01-01 | End: 2023-01-01

## 2023-01-01 RX ORDER — PANTOPRAZOLE SODIUM 40 MG/1
40 TABLET, DELAYED RELEASE ORAL
Status: DISCONTINUED | OUTPATIENT
Start: 2023-01-01 | End: 2023-01-01 | Stop reason: HOSPADM

## 2023-01-01 RX ORDER — BUMETANIDE 0.25 MG/ML
2 INJECTION INTRAMUSCULAR; INTRAVENOUS ONCE
Status: COMPLETED | OUTPATIENT
Start: 2023-01-01 | End: 2023-01-01

## 2023-01-01 RX ORDER — BISACODYL 10 MG
10 SUPPOSITORY, RECTAL RECTAL DAILY PRN
Status: DISCONTINUED | OUTPATIENT
Start: 2023-01-01 | End: 2023-01-01 | Stop reason: HOSPADM

## 2023-01-01 RX ORDER — SOTALOL HYDROCHLORIDE 80 MG/1
160 TABLET ORAL EVERY 24 HOURS
Status: DISCONTINUED | OUTPATIENT
Start: 2023-01-01 | End: 2023-01-01

## 2023-01-01 RX ORDER — LEVALBUTEROL INHALATION SOLUTION 1.25 MG/3ML
1.25 SOLUTION RESPIRATORY (INHALATION) EVERY 4 HOURS PRN
Status: DISCONTINUED | OUTPATIENT
Start: 2023-01-01 | End: 2023-01-01 | Stop reason: HOSPADM

## 2023-01-01 RX ORDER — BUMETANIDE 0.25 MG/ML
2 INJECTION INTRAMUSCULAR; INTRAVENOUS EVERY 8 HOURS
Status: COMPLETED | OUTPATIENT
Start: 2023-01-01 | End: 2023-01-01

## 2023-01-01 RX ORDER — WARFARIN SODIUM 2.5 MG/1
TABLET ORAL
Qty: 116 TABLET | Refills: 1
Start: 2023-01-01 | End: 2023-01-01

## 2023-01-01 RX ORDER — BUDESONIDE AND FORMOTEROL FUMARATE DIHYDRATE 160; 4.5 UG/1; UG/1
2 AEROSOL RESPIRATORY (INHALATION) 2 TIMES DAILY
Qty: 10.2 G | Refills: 11 | Status: SHIPPED | OUTPATIENT
Start: 2023-01-01

## 2023-01-01 RX ORDER — LIDOCAINE 40 MG/G
CREAM TOPICAL
Status: DISCONTINUED | OUTPATIENT
Start: 2023-01-01 | End: 2023-01-01 | Stop reason: HOSPADM

## 2023-01-01 RX ORDER — ASPIRIN 81 MG/1
81 TABLET ORAL DAILY
Status: DISCONTINUED | OUTPATIENT
Start: 2023-01-01 | End: 2023-01-01 | Stop reason: HOSPADM

## 2023-01-01 RX ORDER — WARFARIN SODIUM 2.5 MG/1
2.5 TABLET ORAL
Status: DISCONTINUED | OUTPATIENT
Start: 2023-01-01 | End: 2023-01-01 | Stop reason: HOSPADM

## 2023-01-01 RX ORDER — ALBUTEROL SULFATE 90 UG/1
2 AEROSOL, METERED RESPIRATORY (INHALATION) EVERY 4 HOURS PRN
Qty: 18 G | Refills: 4 | Status: SHIPPED | OUTPATIENT
Start: 2023-01-01 | End: 2024-01-01

## 2023-01-01 RX ORDER — ATORVASTATIN CALCIUM 40 MG/1
40 TABLET, FILM COATED ORAL EVERY EVENING
Status: DISCONTINUED | OUTPATIENT
Start: 2023-01-01 | End: 2023-01-01 | Stop reason: HOSPADM

## 2023-01-01 RX ORDER — ONDANSETRON 4 MG/1
4 TABLET, ORALLY DISINTEGRATING ORAL EVERY 6 HOURS PRN
Status: DISCONTINUED | OUTPATIENT
Start: 2023-01-01 | End: 2023-01-01 | Stop reason: HOSPADM

## 2023-01-01 RX ORDER — FUROSEMIDE 10 MG/ML
20 INJECTION INTRAMUSCULAR; INTRAVENOUS ONCE
Status: DISCONTINUED | OUTPATIENT
Start: 2023-01-01 | End: 2023-01-01

## 2023-01-01 RX ORDER — SOTALOL HYDROCHLORIDE 160 MG/1
TABLET ORAL
Qty: 180 TABLET | Refills: 1 | Status: ON HOLD | OUTPATIENT
Start: 2023-01-01 | End: 2023-01-01

## 2023-01-01 RX ORDER — LEVALBUTEROL INHALATION SOLUTION 1.25 MG/3ML
1.25 SOLUTION RESPIRATORY (INHALATION) 4 TIMES DAILY
Status: DISCONTINUED | OUTPATIENT
Start: 2023-01-01 | End: 2023-01-01 | Stop reason: HOSPADM

## 2023-01-01 RX ORDER — BUMETANIDE 2 MG/1
2 TABLET ORAL
Qty: 180 TABLET | Refills: 0 | Status: SHIPPED | OUTPATIENT
Start: 2023-01-01 | End: 2023-01-01

## 2023-01-01 RX ORDER — POLYETHYLENE GLYCOL 3350 17 G/17G
17 POWDER, FOR SOLUTION ORAL DAILY
Status: DISCONTINUED | OUTPATIENT
Start: 2023-01-01 | End: 2023-01-01 | Stop reason: HOSPADM

## 2023-01-01 RX ORDER — BUMETANIDE 2 MG/1
2 TABLET ORAL
Status: DISCONTINUED | OUTPATIENT
Start: 2023-01-01 | End: 2023-01-01 | Stop reason: HOSPADM

## 2023-01-01 RX ADMIN — LEVALBUTEROL HYDROCHLORIDE 1.25 MG: 1.25 SOLUTION RESPIRATORY (INHALATION) at 16:06

## 2023-01-01 RX ADMIN — PANTOPRAZOLE SODIUM 40 MG: 40 TABLET, DELAYED RELEASE ORAL at 06:45

## 2023-01-01 RX ADMIN — MICONAZOLE NITRATE ANTIFUNGAL POWDER: 2 POWDER TOPICAL at 08:27

## 2023-01-01 RX ADMIN — IPRATROPIUM BROMIDE 0.5 MG: 0.5 SOLUTION RESPIRATORY (INHALATION) at 09:09

## 2023-01-01 RX ADMIN — MICONAZOLE NITRATE ANTIFUNGAL POWDER: 2 POWDER TOPICAL at 20:47

## 2023-01-01 RX ADMIN — PIPERACILLIN AND TAZOBACTAM 3.38 G: 3; .375 INJECTION, POWDER, LYOPHILIZED, FOR SOLUTION INTRAVENOUS at 05:39

## 2023-01-01 RX ADMIN — GUAIFENESIN 600 MG: 600 TABLET ORAL at 20:42

## 2023-01-01 RX ADMIN — UMECLIDINIUM 1 PUFF: 62.5 AEROSOL, POWDER ORAL at 09:24

## 2023-01-01 RX ADMIN — GUAIFENESIN 600 MG: 600 TABLET ORAL at 20:13

## 2023-01-01 RX ADMIN — KIT FOR THE PREPARATION OF TECHNETIUM TC 99M ALBUMIN AGGREGATED 8.2 MILLICURIE: 2.5 INJECTION, POWDER, FOR SOLUTION INTRAVENOUS at 07:47

## 2023-01-01 RX ADMIN — FLUOXETINE 20 MG: 20 CAPSULE ORAL at 08:31

## 2023-01-01 RX ADMIN — LEVALBUTEROL HYDROCHLORIDE 1.25 MG: 1.25 SOLUTION RESPIRATORY (INHALATION) at 20:12

## 2023-01-01 RX ADMIN — BUMETANIDE 2 MG: 0.25 INJECTION INTRAMUSCULAR; INTRAVENOUS at 17:29

## 2023-01-01 RX ADMIN — PANTOPRAZOLE SODIUM 40 MG: 40 TABLET, DELAYED RELEASE ORAL at 06:18

## 2023-01-01 RX ADMIN — SALINE NASAL SPRAY 1 SPRAY: 1.5 SOLUTION NASAL at 16:32

## 2023-01-01 RX ADMIN — SALINE NASAL SPRAY 1 SPRAY: 1.5 SOLUTION NASAL at 12:04

## 2023-01-01 RX ADMIN — SALINE NASAL SPRAY 1 SPRAY: 1.5 SOLUTION NASAL at 08:49

## 2023-01-01 RX ADMIN — NYSTATIN 1000000 UNITS: 100000 SUSPENSION ORAL at 08:44

## 2023-01-01 RX ADMIN — BUMETANIDE 3 MG: 0.25 INJECTION INTRAMUSCULAR; INTRAVENOUS at 12:46

## 2023-01-01 RX ADMIN — SALINE NASAL SPRAY 1 SPRAY: 1.5 SOLUTION NASAL at 17:29

## 2023-01-01 RX ADMIN — CEFTRIAXONE SODIUM 1 G: 1 INJECTION, POWDER, FOR SOLUTION INTRAMUSCULAR; INTRAVENOUS at 16:04

## 2023-01-01 RX ADMIN — DOXYCYCLINE 100 MG: 100 CAPSULE ORAL at 08:58

## 2023-01-01 RX ADMIN — GUAIFENESIN 600 MG: 600 TABLET ORAL at 08:26

## 2023-01-01 RX ADMIN — FLUOXETINE 20 MG: 20 CAPSULE ORAL at 09:04

## 2023-01-01 RX ADMIN — IPRATROPIUM BROMIDE 0.5 MG: 0.5 SOLUTION RESPIRATORY (INHALATION) at 12:00

## 2023-01-01 RX ADMIN — LEVALBUTEROL HYDROCHLORIDE 1.25 MG: 1.25 SOLUTION RESPIRATORY (INHALATION) at 20:24

## 2023-01-01 RX ADMIN — IPRATROPIUM BROMIDE 0.5 MG: 0.5 SOLUTION RESPIRATORY (INHALATION) at 15:36

## 2023-01-01 RX ADMIN — POTASSIUM CHLORIDE 20 MEQ: 1500 TABLET, EXTENDED RELEASE ORAL at 08:26

## 2023-01-01 RX ADMIN — DOXYCYCLINE 100 MG: 100 INJECTION, POWDER, LYOPHILIZED, FOR SOLUTION INTRAVENOUS at 10:55

## 2023-01-01 RX ADMIN — FUROSEMIDE 80 MG: 10 INJECTION, SOLUTION INTRAVENOUS at 03:52

## 2023-01-01 RX ADMIN — POLYETHYLENE GLYCOL 3350 17 G: 17 POWDER, FOR SOLUTION ORAL at 08:16

## 2023-01-01 RX ADMIN — ATORVASTATIN CALCIUM 40 MG: 40 TABLET, FILM COATED ORAL at 20:53

## 2023-01-01 RX ADMIN — SENNOSIDES AND DOCUSATE SODIUM 2 TABLET: 50; 8.6 TABLET ORAL at 08:34

## 2023-01-01 RX ADMIN — DOXYCYCLINE 100 MG: 100 INJECTION, POWDER, LYOPHILIZED, FOR SOLUTION INTRAVENOUS at 00:02

## 2023-01-01 RX ADMIN — IPRATROPIUM BROMIDE 0.5 MG: 0.5 SOLUTION RESPIRATORY (INHALATION) at 19:51

## 2023-01-01 RX ADMIN — LEVALBUTEROL HYDROCHLORIDE 1.25 MG: 1.25 SOLUTION RESPIRATORY (INHALATION) at 16:05

## 2023-01-01 RX ADMIN — POLYETHYLENE GLYCOL 3350 17 G: 17 POWDER, FOR SOLUTION ORAL at 09:07

## 2023-01-01 RX ADMIN — PANTOPRAZOLE SODIUM 40 MG: 40 TABLET, DELAYED RELEASE ORAL at 06:46

## 2023-01-01 RX ADMIN — GUAIFENESIN 600 MG: 600 TABLET ORAL at 20:53

## 2023-01-01 RX ADMIN — FLUOXETINE 20 MG: 20 CAPSULE ORAL at 08:00

## 2023-01-01 RX ADMIN — METHYLPREDNISOLONE SODIUM SUCCINATE 40 MG: 40 INJECTION INTRAMUSCULAR; INTRAVENOUS at 20:49

## 2023-01-01 RX ADMIN — BUMETANIDE 2 MG: 2 TABLET ORAL at 17:10

## 2023-01-01 RX ADMIN — IPRATROPIUM BROMIDE 0.5 MG: 0.5 SOLUTION RESPIRATORY (INHALATION) at 19:05

## 2023-01-01 RX ADMIN — IPRATROPIUM BROMIDE 0.5 MG: 0.5 SOLUTION RESPIRATORY (INHALATION) at 15:47

## 2023-01-01 RX ADMIN — PANTOPRAZOLE SODIUM 40 MG: 40 TABLET, DELAYED RELEASE ORAL at 06:52

## 2023-01-01 RX ADMIN — MICONAZOLE NITRATE ANTIFUNGAL POWDER: 2 POWDER TOPICAL at 08:16

## 2023-01-01 RX ADMIN — ATORVASTATIN CALCIUM 40 MG: 40 TABLET, FILM COATED ORAL at 19:25

## 2023-01-01 RX ADMIN — PIPERACILLIN AND TAZOBACTAM 3.38 G: 3; .375 INJECTION, POWDER, LYOPHILIZED, FOR SOLUTION INTRAVENOUS at 09:15

## 2023-01-01 RX ADMIN — Medication 81 MG: at 08:26

## 2023-01-01 RX ADMIN — FUROSEMIDE 80 MG: 10 INJECTION, SOLUTION INTRAVENOUS at 03:50

## 2023-01-01 RX ADMIN — LEVALBUTEROL HYDROCHLORIDE 1.25 MG: 1.25 SOLUTION RESPIRATORY (INHALATION) at 15:44

## 2023-01-01 RX ADMIN — SOTALOL HYDROCHLORIDE 120 MG: 120 TABLET ORAL at 09:16

## 2023-01-01 RX ADMIN — SALINE NASAL SPRAY 1 SPRAY: 1.5 SOLUTION NASAL at 20:13

## 2023-01-01 RX ADMIN — PIPERACILLIN AND TAZOBACTAM 3.38 G: 3; .375 INJECTION, POWDER, LYOPHILIZED, FOR SOLUTION INTRAVENOUS at 06:11

## 2023-01-01 RX ADMIN — PANTOPRAZOLE SODIUM 40 MG: 40 TABLET, DELAYED RELEASE ORAL at 06:36

## 2023-01-01 RX ADMIN — FLUOXETINE 20 MG: 20 CAPSULE ORAL at 09:22

## 2023-01-01 RX ADMIN — MICONAZOLE NITRATE ANTIFUNGAL POWDER: 2 POWDER TOPICAL at 11:47

## 2023-01-01 RX ADMIN — SALINE NASAL SPRAY 1 SPRAY: 1.5 SOLUTION NASAL at 17:08

## 2023-01-01 RX ADMIN — IPRATROPIUM BROMIDE 0.5 MG: 0.5 SOLUTION RESPIRATORY (INHALATION) at 13:56

## 2023-01-01 RX ADMIN — LEVALBUTEROL HYDROCHLORIDE 1.25 MG: 1.25 SOLUTION RESPIRATORY (INHALATION) at 19:51

## 2023-01-01 RX ADMIN — FLUOXETINE 20 MG: 20 CAPSULE ORAL at 08:55

## 2023-01-01 RX ADMIN — DOXYCYCLINE 100 MG: 100 CAPSULE ORAL at 08:40

## 2023-01-01 RX ADMIN — SENNOSIDES AND DOCUSATE SODIUM 2 TABLET: 50; 8.6 TABLET ORAL at 08:11

## 2023-01-01 RX ADMIN — POTASSIUM CHLORIDE 20 MEQ: 1500 TABLET, EXTENDED RELEASE ORAL at 09:22

## 2023-01-01 RX ADMIN — LEVALBUTEROL HYDROCHLORIDE 1.25 MG: 1.25 SOLUTION RESPIRATORY (INHALATION) at 07:33

## 2023-01-01 RX ADMIN — SALINE NASAL SPRAY 1 SPRAY: 1.5 SOLUTION NASAL at 17:41

## 2023-01-01 RX ADMIN — IPRATROPIUM BROMIDE 0.5 MG: 0.5 SOLUTION RESPIRATORY (INHALATION) at 15:49

## 2023-01-01 RX ADMIN — GUAIFENESIN 600 MG: 600 TABLET ORAL at 20:56

## 2023-01-01 RX ADMIN — ZOLPIDEM TARTRATE 2.5 MG: 5 TABLET ORAL at 21:56

## 2023-01-01 RX ADMIN — IPRATROPIUM BROMIDE 0.5 MG: 0.5 SOLUTION RESPIRATORY (INHALATION) at 07:18

## 2023-01-01 RX ADMIN — MICONAZOLE NITRATE ANTIFUNGAL POWDER: 2 POWDER TOPICAL at 21:13

## 2023-01-01 RX ADMIN — PIPERACILLIN AND TAZOBACTAM 3.38 G: 3; .375 INJECTION, POWDER, LYOPHILIZED, FOR SOLUTION INTRAVENOUS at 13:27

## 2023-01-01 RX ADMIN — DOXYCYCLINE 100 MG: 100 INJECTION, POWDER, LYOPHILIZED, FOR SOLUTION INTRAVENOUS at 22:26

## 2023-01-01 RX ADMIN — DOXYCYCLINE 100 MG: 100 INJECTION, POWDER, LYOPHILIZED, FOR SOLUTION INTRAVENOUS at 00:42

## 2023-01-01 RX ADMIN — METHYLPREDNISOLONE SODIUM SUCCINATE 40 MG: 40 INJECTION INTRAMUSCULAR; INTRAVENOUS at 20:56

## 2023-01-01 RX ADMIN — SALINE NASAL SPRAY 1 SPRAY: 1.5 SOLUTION NASAL at 12:11

## 2023-01-01 RX ADMIN — IPRATROPIUM BROMIDE 0.5 MG: 0.5 SOLUTION RESPIRATORY (INHALATION) at 07:32

## 2023-01-01 RX ADMIN — LEVALBUTEROL HYDROCHLORIDE 1.25 MG: 1.25 SOLUTION RESPIRATORY (INHALATION) at 20:06

## 2023-01-01 RX ADMIN — LEVALBUTEROL HYDROCHLORIDE 1.25 MG: 1.25 SOLUTION RESPIRATORY (INHALATION) at 20:02

## 2023-01-01 RX ADMIN — LEVALBUTEROL HYDROCHLORIDE 1.25 MG: 1.25 SOLUTION RESPIRATORY (INHALATION) at 20:26

## 2023-01-01 RX ADMIN — SALINE NASAL SPRAY 1 SPRAY: 1.5 SOLUTION NASAL at 17:38

## 2023-01-01 RX ADMIN — SOTALOL HYDROCHLORIDE 120 MG: 120 TABLET ORAL at 08:35

## 2023-01-01 RX ADMIN — ATORVASTATIN CALCIUM 40 MG: 40 TABLET, FILM COATED ORAL at 21:13

## 2023-01-01 RX ADMIN — NYSTATIN 1000000 UNITS: 100000 SUSPENSION ORAL at 12:44

## 2023-01-01 RX ADMIN — Medication 81 MG: at 09:04

## 2023-01-01 RX ADMIN — POLYETHYLENE GLYCOL 3350 17 G: 17 POWDER, FOR SOLUTION ORAL at 09:24

## 2023-01-01 RX ADMIN — PIPERACILLIN AND TAZOBACTAM 3.38 G: 3; .375 INJECTION, POWDER, LYOPHILIZED, FOR SOLUTION INTRAVENOUS at 22:28

## 2023-01-01 RX ADMIN — GUAIFENESIN 600 MG: 600 TABLET ORAL at 08:41

## 2023-01-01 RX ADMIN — GUAIFENESIN 600 MG: 600 TABLET ORAL at 08:25

## 2023-01-01 RX ADMIN — LEVALBUTEROL HYDROCHLORIDE 1.25 MG: 1.25 SOLUTION RESPIRATORY (INHALATION) at 20:50

## 2023-01-01 RX ADMIN — SOTALOL HYDROCHLORIDE 160 MG: 80 TABLET ORAL at 20:10

## 2023-01-01 RX ADMIN — ATORVASTATIN CALCIUM 40 MG: 40 TABLET, FILM COATED ORAL at 20:47

## 2023-01-01 RX ADMIN — SALINE NASAL SPRAY 1 SPRAY: 1.5 SOLUTION NASAL at 20:54

## 2023-01-01 RX ADMIN — IPRATROPIUM BROMIDE 0.5 MG: 0.5 SOLUTION RESPIRATORY (INHALATION) at 07:13

## 2023-01-01 RX ADMIN — SALINE NASAL SPRAY 1 SPRAY: 1.5 SOLUTION NASAL at 20:30

## 2023-01-01 RX ADMIN — NYSTATIN 1000000 UNITS: 100000 SUSPENSION ORAL at 08:33

## 2023-01-01 RX ADMIN — SALINE NASAL SPRAY 1 SPRAY: 1.5 SOLUTION NASAL at 20:17

## 2023-01-01 RX ADMIN — PREDNISONE 30 MG: 20 TABLET ORAL at 08:40

## 2023-01-01 RX ADMIN — LEVALBUTEROL HYDROCHLORIDE 1.25 MG: 1.25 SOLUTION RESPIRATORY (INHALATION) at 07:19

## 2023-01-01 RX ADMIN — SOTALOL HYDROCHLORIDE 120 MG: 120 TABLET ORAL at 09:22

## 2023-01-01 RX ADMIN — IPRATROPIUM BROMIDE 0.5 MG: 0.5 SOLUTION RESPIRATORY (INHALATION) at 07:28

## 2023-01-01 RX ADMIN — NYSTATIN 1000000 UNITS: 100000 SUSPENSION ORAL at 17:28

## 2023-01-01 RX ADMIN — ATORVASTATIN CALCIUM 40 MG: 40 TABLET, FILM COATED ORAL at 21:26

## 2023-01-01 RX ADMIN — IPRATROPIUM BROMIDE 0.5 MG: 0.5 SOLUTION RESPIRATORY (INHALATION) at 20:18

## 2023-01-01 RX ADMIN — PANTOPRAZOLE SODIUM 40 MG: 40 TABLET, DELAYED RELEASE ORAL at 06:53

## 2023-01-01 RX ADMIN — NYSTATIN 1000000 UNITS: 100000 SUSPENSION ORAL at 16:19

## 2023-01-01 RX ADMIN — LEVALBUTEROL HYDROCHLORIDE 1.25 MG: 1.25 SOLUTION RESPIRATORY (INHALATION) at 15:32

## 2023-01-01 RX ADMIN — DOXYCYCLINE 100 MG: 100 CAPSULE ORAL at 20:53

## 2023-01-01 RX ADMIN — NYSTATIN 1000000 UNITS: 100000 SUSPENSION ORAL at 20:53

## 2023-01-01 RX ADMIN — NYSTATIN 1000000 UNITS: 100000 SUSPENSION ORAL at 17:07

## 2023-01-01 RX ADMIN — BUMETANIDE 1 MG/HR: 0.25 INJECTION INTRAMUSCULAR; INTRAVENOUS at 09:50

## 2023-01-01 RX ADMIN — SALINE NASAL SPRAY 1 SPRAY: 1.5 SOLUTION NASAL at 12:28

## 2023-01-01 RX ADMIN — IPRATROPIUM BROMIDE 0.5 MG: 0.5 SOLUTION RESPIRATORY (INHALATION) at 08:08

## 2023-01-01 RX ADMIN — FUROSEMIDE 80 MG: 20 TABLET ORAL at 18:07

## 2023-01-01 RX ADMIN — GUAIFENESIN 600 MG: 600 TABLET ORAL at 09:04

## 2023-01-01 RX ADMIN — BUMETANIDE 2 MG: 0.25 INJECTION INTRAMUSCULAR; INTRAVENOUS at 08:38

## 2023-01-01 RX ADMIN — METHYLPREDNISOLONE SODIUM SUCCINATE 125 MG: 125 INJECTION, POWDER, LYOPHILIZED, FOR SOLUTION INTRAMUSCULAR; INTRAVENOUS at 08:58

## 2023-01-01 RX ADMIN — FUROSEMIDE 80 MG: 10 INJECTION, SOLUTION INTRAVENOUS at 19:20

## 2023-01-01 RX ADMIN — GUAIFENESIN 600 MG: 600 TABLET ORAL at 12:07

## 2023-01-01 RX ADMIN — POTASSIUM CHLORIDE 40 MEQ: 1500 TABLET, EXTENDED RELEASE ORAL at 10:46

## 2023-01-01 RX ADMIN — SALINE NASAL SPRAY 1 SPRAY: 1.5 SOLUTION NASAL at 20:56

## 2023-01-01 RX ADMIN — SALINE NASAL SPRAY 1 SPRAY: 1.5 SOLUTION NASAL at 12:07

## 2023-01-01 RX ADMIN — MICONAZOLE NITRATE ANTIFUNGAL POWDER: 2 POWDER TOPICAL at 08:54

## 2023-01-01 RX ADMIN — DOXYCYCLINE 100 MG: 100 INJECTION, POWDER, LYOPHILIZED, FOR SOLUTION INTRAVENOUS at 01:04

## 2023-01-01 RX ADMIN — BUMETANIDE 2 MG: 2 TABLET ORAL at 09:04

## 2023-01-01 RX ADMIN — LEVALBUTEROL HYDROCHLORIDE 1.25 MG: 1.25 SOLUTION RESPIRATORY (INHALATION) at 19:32

## 2023-01-01 RX ADMIN — POTASSIUM CHLORIDE 40 MEQ: 1500 TABLET, EXTENDED RELEASE ORAL at 08:22

## 2023-01-01 RX ADMIN — PREDNISONE 40 MG: 20 TABLET ORAL at 08:11

## 2023-01-01 RX ADMIN — NYSTATIN 1000000 UNITS: 100000 SUSPENSION ORAL at 08:58

## 2023-01-01 RX ADMIN — ATORVASTATIN CALCIUM 40 MG: 40 TABLET, FILM COATED ORAL at 20:29

## 2023-01-01 RX ADMIN — Medication 81 MG: at 09:22

## 2023-01-01 RX ADMIN — SALINE NASAL SPRAY 1 SPRAY: 1.5 SOLUTION NASAL at 21:36

## 2023-01-01 RX ADMIN — BUMETANIDE 3 MG: 2 TABLET ORAL at 06:36

## 2023-01-01 RX ADMIN — POTASSIUM CHLORIDE 20 MEQ: 1.5 POWDER, FOR SOLUTION ORAL at 17:02

## 2023-01-01 RX ADMIN — WARFARIN SODIUM 1.25 MG: 2.5 TABLET ORAL at 17:07

## 2023-01-01 RX ADMIN — IPRATROPIUM BROMIDE 0.5 MG: 0.5 SOLUTION RESPIRATORY (INHALATION) at 07:19

## 2023-01-01 RX ADMIN — SALINE NASAL SPRAY 1 SPRAY: 1.5 SOLUTION NASAL at 08:55

## 2023-01-01 RX ADMIN — PIPERACILLIN AND TAZOBACTAM 3.38 G: 3; .375 INJECTION, POWDER, LYOPHILIZED, FOR SOLUTION INTRAVENOUS at 05:49

## 2023-01-01 RX ADMIN — IPRATROPIUM BROMIDE 0.5 MG: 0.5 SOLUTION RESPIRATORY (INHALATION) at 19:36

## 2023-01-01 RX ADMIN — IPRATROPIUM BROMIDE 0.5 MG: 0.5 SOLUTION RESPIRATORY (INHALATION) at 19:00

## 2023-01-01 RX ADMIN — SOTALOL HYDROCHLORIDE 120 MG: 120 TABLET ORAL at 08:52

## 2023-01-01 RX ADMIN — LOSARTAN POTASSIUM 25 MG: 25 TABLET, FILM COATED ORAL at 09:06

## 2023-01-01 RX ADMIN — WARFARIN SODIUM 1 MG: 1 TABLET ORAL at 17:38

## 2023-01-01 RX ADMIN — ALBUMIN HUMAN 50 G: 0.25 SOLUTION INTRAVENOUS at 07:52

## 2023-01-01 RX ADMIN — PREDNISONE 30 MG: 20 TABLET ORAL at 08:06

## 2023-01-01 RX ADMIN — DOXYCYCLINE 100 MG: 100 CAPSULE ORAL at 20:29

## 2023-01-01 RX ADMIN — ATORVASTATIN CALCIUM 40 MG: 40 TABLET, FILM COATED ORAL at 20:56

## 2023-01-01 RX ADMIN — METHYLPREDNISOLONE SODIUM SUCCINATE 40 MG: 40 INJECTION INTRAMUSCULAR; INTRAVENOUS at 08:28

## 2023-01-01 RX ADMIN — IPRATROPIUM BROMIDE 0.5 MG: 0.5 SOLUTION RESPIRATORY (INHALATION) at 19:33

## 2023-01-01 RX ADMIN — PREDNISONE 10 MG: 5 TABLET ORAL at 09:04

## 2023-01-01 RX ADMIN — DOXYCYCLINE 100 MG: 100 CAPSULE ORAL at 20:56

## 2023-01-01 RX ADMIN — UMECLIDINIUM 1 PUFF: 62.5 AEROSOL, POWDER ORAL at 08:56

## 2023-01-01 RX ADMIN — WARFARIN SODIUM 2.5 MG: 2.5 TABLET ORAL at 17:30

## 2023-01-01 RX ADMIN — SALINE NASAL SPRAY 1 SPRAY: 1.5 SOLUTION NASAL at 17:14

## 2023-01-01 RX ADMIN — MICONAZOLE NITRATE ANTIFUNGAL POWDER: 2 POWDER TOPICAL at 20:43

## 2023-01-01 RX ADMIN — FUROSEMIDE 80 MG: 10 INJECTION, SOLUTION INTRAVENOUS at 10:51

## 2023-01-01 RX ADMIN — PREDNISONE 40 MG: 20 TABLET ORAL at 08:34

## 2023-01-01 RX ADMIN — SALINE NASAL SPRAY 1 SPRAY: 1.5 SOLUTION NASAL at 14:07

## 2023-01-01 RX ADMIN — BUMETANIDE 1 MG/HR: 0.25 INJECTION INTRAMUSCULAR; INTRAVENOUS at 12:45

## 2023-01-01 RX ADMIN — LEVALBUTEROL HYDROCHLORIDE 1.25 MG: 1.25 SOLUTION RESPIRATORY (INHALATION) at 07:18

## 2023-01-01 RX ADMIN — IPRATROPIUM BROMIDE 0.5 MG: 0.5 SOLUTION RESPIRATORY (INHALATION) at 07:05

## 2023-01-01 RX ADMIN — IPRATROPIUM BROMIDE 0.5 MG: 0.5 SOLUTION RESPIRATORY (INHALATION) at 07:22

## 2023-01-01 RX ADMIN — SALINE NASAL SPRAY 1 SPRAY: 1.5 SOLUTION NASAL at 20:53

## 2023-01-01 RX ADMIN — NYSTATIN 1000000 UNITS: 100000 SUSPENSION ORAL at 12:05

## 2023-01-01 RX ADMIN — IPRATROPIUM BROMIDE 0.5 MG: 0.5 SOLUTION RESPIRATORY (INHALATION) at 20:12

## 2023-01-01 RX ADMIN — IPRATROPIUM BROMIDE 0.5 MG: 0.5 SOLUTION RESPIRATORY (INHALATION) at 20:24

## 2023-01-01 RX ADMIN — LEVALBUTEROL HYDROCHLORIDE 1.25 MG: 1.25 SOLUTION RESPIRATORY (INHALATION) at 15:48

## 2023-01-01 RX ADMIN — WARFARIN SODIUM 1 MG: 1 TABLET ORAL at 17:30

## 2023-01-01 RX ADMIN — PANTOPRAZOLE SODIUM 40 MG: 40 TABLET, DELAYED RELEASE ORAL at 07:13

## 2023-01-01 RX ADMIN — PIPERACILLIN AND TAZOBACTAM 3.38 G: 3; .375 INJECTION, POWDER, LYOPHILIZED, FOR SOLUTION INTRAVENOUS at 13:58

## 2023-01-01 RX ADMIN — GUAIFENESIN 600 MG: 600 TABLET ORAL at 21:13

## 2023-01-01 RX ADMIN — PERFLUTREN 3 ML: 6.52 INJECTION, SUSPENSION INTRAVENOUS at 14:32

## 2023-01-01 RX ADMIN — IPRATROPIUM BROMIDE 0.5 MG: 0.5 SOLUTION RESPIRATORY (INHALATION) at 12:44

## 2023-01-01 RX ADMIN — SENNOSIDES AND DOCUSATE SODIUM 2 TABLET: 50; 8.6 TABLET ORAL at 20:20

## 2023-01-01 RX ADMIN — OXYMETAZOLINE HYDROCHLORIDE 2 SPRAY: 0.05 SPRAY NASAL at 20:53

## 2023-01-01 RX ADMIN — FUROSEMIDE 80 MG: 10 INJECTION, SOLUTION INTRAVENOUS at 10:19

## 2023-01-01 RX ADMIN — DOXYCYCLINE 100 MG: 100 CAPSULE ORAL at 08:24

## 2023-01-01 RX ADMIN — ATORVASTATIN CALCIUM 40 MG: 40 TABLET, FILM COATED ORAL at 20:43

## 2023-01-01 RX ADMIN — IPRATROPIUM BROMIDE 0.5 MG: 0.5 SOLUTION RESPIRATORY (INHALATION) at 13:10

## 2023-01-01 RX ADMIN — SOTALOL HYDROCHLORIDE 160 MG: 80 TABLET ORAL at 08:22

## 2023-01-01 RX ADMIN — NYSTATIN 1000000 UNITS: 100000 SUSPENSION ORAL at 14:07

## 2023-01-01 RX ADMIN — SALINE NASAL SPRAY 1 SPRAY: 1.5 SOLUTION NASAL at 12:54

## 2023-01-01 RX ADMIN — LEVALBUTEROL HYDROCHLORIDE 1.25 MG: 1.25 SOLUTION RESPIRATORY (INHALATION) at 07:05

## 2023-01-01 RX ADMIN — SALINE NASAL SPRAY 1 SPRAY: 1.5 SOLUTION NASAL at 09:06

## 2023-01-01 RX ADMIN — DOXYCYCLINE 100 MG: 100 INJECTION, POWDER, LYOPHILIZED, FOR SOLUTION INTRAVENOUS at 03:58

## 2023-01-01 RX ADMIN — PIPERACILLIN AND TAZOBACTAM 3.38 G: 3; .375 INJECTION, POWDER, LYOPHILIZED, FOR SOLUTION INTRAVENOUS at 13:26

## 2023-01-01 RX ADMIN — IPRATROPIUM BROMIDE 0.5 MG: 0.5 SOLUTION RESPIRATORY (INHALATION) at 16:05

## 2023-01-01 RX ADMIN — WARFARIN SODIUM 2.5 MG: 2.5 TABLET ORAL at 17:29

## 2023-01-01 RX ADMIN — FUROSEMIDE 80 MG: 10 INJECTION, SOLUTION INTRAVENOUS at 03:35

## 2023-01-01 RX ADMIN — PANTOPRAZOLE SODIUM 40 MG: 40 TABLET, DELAYED RELEASE ORAL at 10:50

## 2023-01-01 RX ADMIN — SALINE NASAL SPRAY 1 SPRAY: 1.5 SOLUTION NASAL at 08:25

## 2023-01-01 RX ADMIN — NYSTATIN 1000000 UNITS: 100000 SUSPENSION ORAL at 21:14

## 2023-01-01 RX ADMIN — LEVALBUTEROL HYDROCHLORIDE 1.25 MG: 1.25 SOLUTION RESPIRATORY (INHALATION) at 12:44

## 2023-01-01 RX ADMIN — IPRATROPIUM BROMIDE 0.5 MG: 0.5 SOLUTION RESPIRATORY (INHALATION) at 20:26

## 2023-01-01 RX ADMIN — LEVALBUTEROL HYDROCHLORIDE 1.25 MG: 1.25 SOLUTION RESPIRATORY (INHALATION) at 13:13

## 2023-01-01 RX ADMIN — NYSTATIN 1000000 UNITS: 100000 SUSPENSION ORAL at 12:02

## 2023-01-01 RX ADMIN — SOTALOL HYDROCHLORIDE 160 MG: 80 TABLET ORAL at 08:34

## 2023-01-01 RX ADMIN — VANCOMYCIN HYDROCHLORIDE 2000 MG: 5 INJECTION, POWDER, LYOPHILIZED, FOR SOLUTION INTRAVENOUS at 02:18

## 2023-01-01 RX ADMIN — LEVALBUTEROL HYDROCHLORIDE 1.25 MG: 1.25 SOLUTION RESPIRATORY (INHALATION) at 12:16

## 2023-01-01 RX ADMIN — LEVALBUTEROL HYDROCHLORIDE 1.25 MG: 1.25 SOLUTION RESPIRATORY (INHALATION) at 12:00

## 2023-01-01 RX ADMIN — SOTALOL HYDROCHLORIDE 120 MG: 120 TABLET ORAL at 09:15

## 2023-01-01 RX ADMIN — SALINE NASAL SPRAY 1 SPRAY: 1.5 SOLUTION NASAL at 08:32

## 2023-01-01 RX ADMIN — PIPERACILLIN AND TAZOBACTAM 3.38 G: 3; .375 INJECTION, POWDER, LYOPHILIZED, FOR SOLUTION INTRAVENOUS at 22:31

## 2023-01-01 RX ADMIN — SALINE NASAL SPRAY 1 SPRAY: 1.5 SOLUTION NASAL at 16:25

## 2023-01-01 RX ADMIN — IPRATROPIUM BROMIDE 0.5 MG: 0.5 SOLUTION RESPIRATORY (INHALATION) at 11:26

## 2023-01-01 RX ADMIN — PIPERACILLIN AND TAZOBACTAM 3.38 G: 3; .375 INJECTION, POWDER, LYOPHILIZED, FOR SOLUTION INTRAVENOUS at 21:14

## 2023-01-01 RX ADMIN — SALINE NASAL SPRAY 1 SPRAY: 1.5 SOLUTION NASAL at 09:25

## 2023-01-01 RX ADMIN — SOTALOL HYDROCHLORIDE 120 MG: 120 TABLET ORAL at 08:54

## 2023-01-01 RX ADMIN — BUMETANIDE 2 MG: 2 TABLET ORAL at 08:00

## 2023-01-01 RX ADMIN — FLUOXETINE 20 MG: 20 CAPSULE ORAL at 08:57

## 2023-01-01 RX ADMIN — BUMETANIDE 3 MG: 2 TABLET ORAL at 17:30

## 2023-01-01 RX ADMIN — GUAIFENESIN 600 MG: 600 TABLET ORAL at 09:22

## 2023-01-01 RX ADMIN — GUAIFENESIN 600 MG: 600 TABLET ORAL at 20:47

## 2023-01-01 RX ADMIN — POTASSIUM CHLORIDE 40 MEQ: 1500 TABLET, EXTENDED RELEASE ORAL at 14:38

## 2023-01-01 RX ADMIN — NYSTATIN 1000000 UNITS: 100000 SUSPENSION ORAL at 08:10

## 2023-01-01 RX ADMIN — SENNOSIDES AND DOCUSATE SODIUM 2 TABLET: 50; 8.6 TABLET ORAL at 20:53

## 2023-01-01 RX ADMIN — LEVALBUTEROL HYDROCHLORIDE 1.25 MG: 1.25 SOLUTION RESPIRATORY (INHALATION) at 11:10

## 2023-01-01 RX ADMIN — IPRATROPIUM BROMIDE 0.5 MG: 0.5 SOLUTION RESPIRATORY (INHALATION) at 11:14

## 2023-01-01 RX ADMIN — SALINE NASAL SPRAY 1 SPRAY: 1.5 SOLUTION NASAL at 21:13

## 2023-01-01 RX ADMIN — IPRATROPIUM BROMIDE 0.5 MG: 0.5 SOLUTION RESPIRATORY (INHALATION) at 20:14

## 2023-01-01 RX ADMIN — PANTOPRAZOLE SODIUM 40 MG: 40 TABLET, DELAYED RELEASE ORAL at 06:31

## 2023-01-01 RX ADMIN — POLYETHYLENE GLYCOL 3350 17 G: 17 POWDER, FOR SOLUTION ORAL at 08:53

## 2023-01-01 RX ADMIN — PANTOPRAZOLE SODIUM 40 MG: 40 TABLET, DELAYED RELEASE ORAL at 09:25

## 2023-01-01 RX ADMIN — IPRATROPIUM BROMIDE 0.5 MG: 0.5 SOLUTION RESPIRATORY (INHALATION) at 12:13

## 2023-01-01 RX ADMIN — OXYMETAZOLINE HYDROCHLORIDE 2 SPRAY: 0.05 SPRAY NASAL at 21:13

## 2023-01-01 RX ADMIN — LEVALBUTEROL HYDROCHLORIDE 1.25 MG: 1.25 SOLUTION RESPIRATORY (INHALATION) at 15:36

## 2023-01-01 RX ADMIN — IPRATROPIUM BROMIDE 0.5 MG: 0.5 SOLUTION RESPIRATORY (INHALATION) at 07:57

## 2023-01-01 RX ADMIN — GUAIFENESIN 600 MG: 600 TABLET ORAL at 08:57

## 2023-01-01 RX ADMIN — IPRATROPIUM BROMIDE 0.5 MG: 0.5 SOLUTION RESPIRATORY (INHALATION) at 11:48

## 2023-01-01 RX ADMIN — IPRATROPIUM BROMIDE 0.5 MG: 0.5 SOLUTION RESPIRATORY (INHALATION) at 12:16

## 2023-01-01 RX ADMIN — BUMETANIDE 1 MG/HR: 0.25 INJECTION INTRAMUSCULAR; INTRAVENOUS at 08:36

## 2023-01-01 RX ADMIN — LEVALBUTEROL HYDROCHLORIDE 1.25 MG: 1.25 SOLUTION RESPIRATORY (INHALATION) at 11:14

## 2023-01-01 RX ADMIN — BUMETANIDE 2 MG: 0.25 INJECTION INTRAMUSCULAR; INTRAVENOUS at 23:39

## 2023-01-01 RX ADMIN — ATORVASTATIN CALCIUM 40 MG: 40 TABLET, FILM COATED ORAL at 20:10

## 2023-01-01 RX ADMIN — LEVALBUTEROL HYDROCHLORIDE 1.25 MG: 1.25 SOLUTION RESPIRATORY (INHALATION) at 07:22

## 2023-01-01 RX ADMIN — FLUOXETINE 20 MG: 20 CAPSULE ORAL at 08:41

## 2023-01-01 RX ADMIN — FUROSEMIDE 80 MG: 10 INJECTION, SOLUTION INTRAVENOUS at 11:29

## 2023-01-01 RX ADMIN — IPRATROPIUM BROMIDE AND ALBUTEROL SULFATE 3 ML: .5; 3 SOLUTION RESPIRATORY (INHALATION) at 08:53

## 2023-01-01 RX ADMIN — PREDNISONE 30 MG: 20 TABLET ORAL at 08:34

## 2023-01-01 RX ADMIN — FLUOXETINE 20 MG: 20 CAPSULE ORAL at 08:34

## 2023-01-01 RX ADMIN — PIPERACILLIN AND TAZOBACTAM 3.38 G: 3; .375 INJECTION, POWDER, LYOPHILIZED, FOR SOLUTION INTRAVENOUS at 13:54

## 2023-01-01 RX ADMIN — LEVALBUTEROL HYDROCHLORIDE 1.25 MG: 1.25 SOLUTION RESPIRATORY (INHALATION) at 08:08

## 2023-01-01 RX ADMIN — SOTALOL HYDROCHLORIDE 160 MG: 80 TABLET ORAL at 21:04

## 2023-01-01 RX ADMIN — POLYETHYLENE GLYCOL 3350 17 G: 17 POWDER, FOR SOLUTION ORAL at 08:54

## 2023-01-01 RX ADMIN — SOTALOL HYDROCHLORIDE 160 MG: 80 TABLET ORAL at 20:42

## 2023-01-01 RX ADMIN — Medication 81 MG: at 08:31

## 2023-01-01 RX ADMIN — WARFARIN SODIUM 0.5 MG: 1 TABLET ORAL at 18:06

## 2023-01-01 RX ADMIN — ZOLPIDEM TARTRATE 2.5 MG: 5 TABLET ORAL at 21:24

## 2023-01-01 RX ADMIN — POLYETHYLENE GLYCOL 3350 17 G: 17 POWDER, FOR SOLUTION ORAL at 08:22

## 2023-01-01 RX ADMIN — POLYETHYLENE GLYCOL 3350 17 G: 17 POWDER, FOR SOLUTION ORAL at 09:05

## 2023-01-01 RX ADMIN — PREDNISONE 20 MG: 20 TABLET ORAL at 08:26

## 2023-01-01 RX ADMIN — BUMETANIDE 2 MG: 0.25 INJECTION INTRAMUSCULAR; INTRAVENOUS at 00:30

## 2023-01-01 RX ADMIN — POTASSIUM CHLORIDE 20 MEQ: 1500 TABLET, EXTENDED RELEASE ORAL at 09:04

## 2023-01-01 RX ADMIN — SOTALOL HYDROCHLORIDE 120 MG: 120 TABLET ORAL at 08:55

## 2023-01-01 RX ADMIN — Medication 81 MG: at 08:57

## 2023-01-01 RX ADMIN — SALINE NASAL SPRAY 1 SPRAY: 1.5 SOLUTION NASAL at 12:44

## 2023-01-01 RX ADMIN — Medication 81 MG: at 08:55

## 2023-01-01 RX ADMIN — FUROSEMIDE 80 MG: 10 INJECTION, SOLUTION INTRAVENOUS at 20:10

## 2023-01-01 RX ADMIN — GUAIFENESIN 600 MG: 600 TABLET ORAL at 08:55

## 2023-01-01 RX ADMIN — FLUOXETINE 20 MG: 20 CAPSULE ORAL at 08:11

## 2023-01-01 RX ADMIN — SALINE NASAL SPRAY 1 SPRAY: 1.5 SOLUTION NASAL at 08:54

## 2023-01-01 RX ADMIN — IPRATROPIUM BROMIDE 0.5 MG: 0.5 SOLUTION RESPIRATORY (INHALATION) at 11:17

## 2023-01-01 RX ADMIN — POLYETHYLENE GLYCOL 3350 17 G: 17 POWDER, FOR SOLUTION ORAL at 08:24

## 2023-01-01 RX ADMIN — Medication 81 MG: at 08:00

## 2023-01-01 RX ADMIN — LEVALBUTEROL HYDROCHLORIDE 1.25 MG: 1.25 SOLUTION RESPIRATORY (INHALATION) at 20:18

## 2023-01-01 RX ADMIN — IPRATROPIUM BROMIDE 0.5 MG: 0.5 SOLUTION RESPIRATORY (INHALATION) at 16:06

## 2023-01-01 RX ADMIN — FLUOXETINE 20 MG: 20 CAPSULE ORAL at 08:22

## 2023-01-01 RX ADMIN — NYSTATIN 1000000 UNITS: 100000 SUSPENSION ORAL at 13:30

## 2023-01-01 RX ADMIN — POLYETHYLENE GLYCOL 3350 17 G: 17 POWDER, FOR SOLUTION ORAL at 11:47

## 2023-01-01 RX ADMIN — PIPERACILLIN AND TAZOBACTAM 3.38 G: 3; .375 INJECTION, POWDER, LYOPHILIZED, FOR SOLUTION INTRAVENOUS at 06:15

## 2023-01-01 RX ADMIN — SENNOSIDES AND DOCUSATE SODIUM 2 TABLET: 50; 8.6 TABLET ORAL at 09:04

## 2023-01-01 RX ADMIN — PIPERACILLIN AND TAZOBACTAM 3.38 G: 3; .375 INJECTION, POWDER, LYOPHILIZED, FOR SOLUTION INTRAVENOUS at 21:30

## 2023-01-01 RX ADMIN — IPRATROPIUM BROMIDE 0.5 MG: 0.5 SOLUTION RESPIRATORY (INHALATION) at 13:13

## 2023-01-01 RX ADMIN — BUMETANIDE 2 MG: 2 TABLET ORAL at 08:55

## 2023-01-01 RX ADMIN — POTASSIUM CHLORIDE 40 MEQ: 1.5 POWDER, FOR SOLUTION ORAL at 11:56

## 2023-01-01 RX ADMIN — SENNOSIDES AND DOCUSATE SODIUM 2 TABLET: 50; 8.6 TABLET ORAL at 08:00

## 2023-01-01 RX ADMIN — ALBUMIN HUMAN 50 G: 0.25 SOLUTION INTRAVENOUS at 15:03

## 2023-01-01 RX ADMIN — SALINE NASAL SPRAY 1 SPRAY: 1.5 SOLUTION NASAL at 10:11

## 2023-01-01 RX ADMIN — WARFARIN SODIUM 1 MG: 1 TABLET ORAL at 17:31

## 2023-01-01 RX ADMIN — NYSTATIN 1000000 UNITS: 100000 SUSPENSION ORAL at 20:17

## 2023-01-01 RX ADMIN — DOXYCYCLINE 100 MG: 100 INJECTION, POWDER, LYOPHILIZED, FOR SOLUTION INTRAVENOUS at 16:46

## 2023-01-01 RX ADMIN — BUMETANIDE 2 MG: 0.25 INJECTION INTRAMUSCULAR; INTRAVENOUS at 15:22

## 2023-01-01 RX ADMIN — Medication 1 MG: at 22:43

## 2023-01-01 RX ADMIN — BUMETANIDE 3 MG: 0.25 INJECTION INTRAMUSCULAR; INTRAVENOUS at 06:54

## 2023-01-01 RX ADMIN — WARFARIN SODIUM 1 MG: 1 TABLET ORAL at 17:14

## 2023-01-01 RX ADMIN — SOTALOL HYDROCHLORIDE 120 MG: 120 TABLET ORAL at 09:24

## 2023-01-01 RX ADMIN — SOTALOL HYDROCHLORIDE 160 MG: 80 TABLET ORAL at 09:14

## 2023-01-01 RX ADMIN — IPRATROPIUM BROMIDE 0.5 MG: 0.5 SOLUTION RESPIRATORY (INHALATION) at 11:11

## 2023-01-01 RX ADMIN — AMOXICILLIN AND CLAVULANATE POTASSIUM 1 TABLET: 875; 125 TABLET, FILM COATED ORAL at 08:43

## 2023-01-01 RX ADMIN — LEVALBUTEROL HYDROCHLORIDE 1.25 MG: 1.25 SOLUTION RESPIRATORY (INHALATION) at 19:36

## 2023-01-01 RX ADMIN — LEVALBUTEROL HYDROCHLORIDE 1.25 MG: 1.25 SOLUTION RESPIRATORY (INHALATION) at 09:02

## 2023-01-01 RX ADMIN — SALINE NASAL SPRAY 1 SPRAY: 1.5 SOLUTION NASAL at 20:47

## 2023-01-01 RX ADMIN — FUROSEMIDE 80 MG: 10 INJECTION, SOLUTION INTRAVENOUS at 03:56

## 2023-01-01 RX ADMIN — POTASSIUM CHLORIDE 20 MEQ: 1500 TABLET, EXTENDED RELEASE ORAL at 08:00

## 2023-01-01 RX ADMIN — FUROSEMIDE 80 MG: 10 INJECTION, SOLUTION INTRAVENOUS at 19:13

## 2023-01-01 RX ADMIN — SOTALOL HYDROCHLORIDE 120 MG: 120 TABLET ORAL at 08:10

## 2023-01-01 RX ADMIN — IPRATROPIUM BROMIDE 0.5 MG: 0.5 SOLUTION RESPIRATORY (INHALATION) at 13:55

## 2023-01-01 RX ADMIN — FUROSEMIDE 80 MG: 10 INJECTION, SOLUTION INTRAVENOUS at 10:45

## 2023-01-01 RX ADMIN — LEVALBUTEROL HYDROCHLORIDE 1.25 MG: 1.25 SOLUTION RESPIRATORY (INHALATION) at 07:28

## 2023-01-01 RX ADMIN — SALINE NASAL SPRAY 1 SPRAY: 1.5 SOLUTION NASAL at 17:12

## 2023-01-01 RX ADMIN — ALBUMIN HUMAN 50 G: 0.25 SOLUTION INTRAVENOUS at 09:31

## 2023-01-01 RX ADMIN — SENNOSIDES AND DOCUSATE SODIUM 2 TABLET: 50; 8.6 TABLET ORAL at 21:13

## 2023-01-01 RX ADMIN — LEVALBUTEROL HYDROCHLORIDE 1.25 MG: 1.25 SOLUTION RESPIRATORY (INHALATION) at 13:55

## 2023-01-01 RX ADMIN — LEVALBUTEROL HYDROCHLORIDE 1.25 MG: 1.25 SOLUTION RESPIRATORY (INHALATION) at 11:48

## 2023-01-01 RX ADMIN — MICONAZOLE NITRATE ANTIFUNGAL POWDER: 2 POWDER TOPICAL at 09:29

## 2023-01-01 RX ADMIN — FUROSEMIDE 80 MG: 10 INJECTION, SOLUTION INTRAVENOUS at 18:56

## 2023-01-01 RX ADMIN — LEVALBUTEROL HYDROCHLORIDE 1.25 MG: 1.25 SOLUTION RESPIRATORY (INHALATION) at 11:25

## 2023-01-01 RX ADMIN — IPRATROPIUM BROMIDE 0.5 MG: 0.5 SOLUTION RESPIRATORY (INHALATION) at 20:02

## 2023-01-01 RX ADMIN — NYSTATIN 1000000 UNITS: 100000 SUSPENSION ORAL at 20:29

## 2023-01-01 RX ADMIN — GUAIFENESIN 600 MG: 600 TABLET ORAL at 20:29

## 2023-01-01 RX ADMIN — PANTOPRAZOLE SODIUM 40 MG: 40 TABLET, DELAYED RELEASE ORAL at 06:54

## 2023-01-01 RX ADMIN — ZOLPIDEM TARTRATE 2.5 MG: 5 TABLET ORAL at 20:46

## 2023-01-01 RX ADMIN — FUROSEMIDE 80 MG: 20 TABLET ORAL at 08:08

## 2023-01-01 RX ADMIN — DOXYCYCLINE 100 MG: 100 CAPSULE ORAL at 20:20

## 2023-01-01 RX ADMIN — IPRATROPIUM BROMIDE 0.5 MG: 0.5 SOLUTION RESPIRATORY (INHALATION) at 09:03

## 2023-01-01 RX ADMIN — Medication 81 MG: at 08:43

## 2023-01-01 RX ADMIN — MICONAZOLE NITRATE ANTIFUNGAL POWDER: 2 POWDER TOPICAL at 09:05

## 2023-01-01 RX ADMIN — DOXYCYCLINE 100 MG: 100 CAPSULE ORAL at 08:30

## 2023-01-01 RX ADMIN — MICONAZOLE NITRATE ANTIFUNGAL POWDER: 2 POWDER TOPICAL at 20:13

## 2023-01-01 RX ADMIN — LEVALBUTEROL HYDROCHLORIDE 1.25 MG: 1.25 SOLUTION RESPIRATORY (INHALATION) at 12:10

## 2023-01-01 RX ADMIN — OXYMETAZOLINE HYDROCHLORIDE 2 SPRAY: 0.05 SPRAY NASAL at 10:11

## 2023-01-01 RX ADMIN — DOXYCYCLINE 100 MG: 100 CAPSULE ORAL at 12:42

## 2023-01-01 RX ADMIN — LEVALBUTEROL HYDROCHLORIDE 1.25 MG: 1.25 SOLUTION RESPIRATORY (INHALATION) at 13:56

## 2023-01-01 RX ADMIN — AMOXICILLIN AND CLAVULANATE POTASSIUM 1 TABLET: 875; 125 TABLET, FILM COATED ORAL at 20:53

## 2023-01-01 RX ADMIN — SALINE NASAL SPRAY 1 SPRAY: 1.5 SOLUTION NASAL at 12:02

## 2023-01-01 RX ADMIN — SALINE NASAL SPRAY 1 SPRAY: 1.5 SOLUTION NASAL at 08:17

## 2023-01-01 RX ADMIN — SALINE NASAL SPRAY 1 SPRAY: 1.5 SOLUTION NASAL at 20:43

## 2023-01-01 RX ADMIN — LEVALBUTEROL HYDROCHLORIDE 1.25 MG: 1.25 SOLUTION RESPIRATORY (INHALATION) at 09:09

## 2023-01-01 RX ADMIN — DOXYCYCLINE 100 MG: 100 CAPSULE ORAL at 20:52

## 2023-01-01 RX ADMIN — LEVALBUTEROL HYDROCHLORIDE 1.25 MG: 1.25 SOLUTION RESPIRATORY (INHALATION) at 07:13

## 2023-01-01 RX ADMIN — ATORVASTATIN CALCIUM 40 MG: 40 TABLET, FILM COATED ORAL at 20:20

## 2023-01-01 RX ADMIN — FLUOXETINE 20 MG: 20 CAPSULE ORAL at 08:35

## 2023-01-01 RX ADMIN — PANTOPRAZOLE SODIUM 40 MG: 40 TABLET, DELAYED RELEASE ORAL at 06:32

## 2023-01-01 RX ADMIN — OXYMETAZOLINE HYDROCHLORIDE 2 SPRAY: 0.05 SPRAY NASAL at 08:11

## 2023-01-01 RX ADMIN — SALINE NASAL SPRAY 1 SPRAY: 1.5 SOLUTION NASAL at 16:22

## 2023-01-01 RX ADMIN — UMECLIDINIUM 1 PUFF: 62.5 AEROSOL, POWDER ORAL at 08:32

## 2023-01-01 RX ADMIN — AMOXICILLIN AND CLAVULANATE POTASSIUM 1 TABLET: 875; 125 TABLET, FILM COATED ORAL at 12:41

## 2023-01-01 RX ADMIN — PREDNISONE 10 MG: 5 TABLET ORAL at 09:22

## 2023-01-01 RX ADMIN — GUAIFENESIN 600 MG: 600 TABLET ORAL at 08:31

## 2023-01-01 RX ADMIN — LEVALBUTEROL HYDROCHLORIDE 1.25 MG: 1.25 SOLUTION RESPIRATORY (INHALATION) at 20:14

## 2023-01-01 RX ADMIN — SALINE NASAL SPRAY 1 SPRAY: 1.5 SOLUTION NASAL at 11:56

## 2023-01-01 RX ADMIN — SOTALOL HYDROCHLORIDE 160 MG: 80 TABLET ORAL at 08:11

## 2023-01-01 RX ADMIN — LEVALBUTEROL HYDROCHLORIDE 1.25 MG: 1.25 SOLUTION RESPIRATORY (INHALATION) at 13:10

## 2023-01-01 RX ADMIN — SALINE NASAL SPRAY 1 SPRAY: 1.5 SOLUTION NASAL at 08:30

## 2023-01-01 RX ADMIN — LEVALBUTEROL HYDROCHLORIDE 1.25 MG: 1.25 SOLUTION RESPIRATORY (INHALATION) at 19:04

## 2023-01-01 RX ADMIN — IPRATROPIUM BROMIDE 0.5 MG: 0.5 SOLUTION RESPIRATORY (INHALATION) at 20:06

## 2023-01-01 RX ADMIN — PREDNISONE 20 MG: 20 TABLET ORAL at 08:31

## 2023-01-01 RX ADMIN — PREDNISONE 10 MG: 5 TABLET ORAL at 08:55

## 2023-01-01 RX ADMIN — LEVALBUTEROL HYDROCHLORIDE 1.25 MG: 1.25 SOLUTION RESPIRATORY (INHALATION) at 11:17

## 2023-01-01 RX ADMIN — SALINE NASAL SPRAY 1 SPRAY: 1.5 SOLUTION NASAL at 09:26

## 2023-01-01 RX ADMIN — FLUOXETINE 20 MG: 20 CAPSULE ORAL at 08:07

## 2023-01-01 RX ADMIN — NYSTATIN 1000000 UNITS: 100000 SUSPENSION ORAL at 17:30

## 2023-01-01 RX ADMIN — IPRATROPIUM BROMIDE 0.5 MG: 0.5 SOLUTION RESPIRATORY (INHALATION) at 20:50

## 2023-01-01 RX ADMIN — NYSTATIN 1000000 UNITS: 100000 SUSPENSION ORAL at 16:24

## 2023-01-01 RX ADMIN — SENNOSIDES AND DOCUSATE SODIUM 2 TABLET: 50; 8.6 TABLET ORAL at 20:42

## 2023-01-01 RX ADMIN — IPRATROPIUM BROMIDE 0.5 MG: 0.5 SOLUTION RESPIRATORY (INHALATION) at 08:07

## 2023-01-01 RX ADMIN — WARFARIN SODIUM 1.5 MG: 1 TABLET ORAL at 17:11

## 2023-01-01 RX ADMIN — NYSTATIN 1000000 UNITS: 100000 SUSPENSION ORAL at 08:37

## 2023-01-01 RX ADMIN — LEVALBUTEROL HYDROCHLORIDE 1.25 MG: 1.25 SOLUTION RESPIRATORY (INHALATION) at 07:57

## 2023-01-01 RX ADMIN — DOXYCYCLINE 100 MG: 100 INJECTION, POWDER, LYOPHILIZED, FOR SOLUTION INTRAVENOUS at 10:46

## 2023-01-01 RX ADMIN — PREDNISONE 20 MG: 20 TABLET ORAL at 08:57

## 2023-01-01 RX ADMIN — BUMETANIDE 2 MG: 0.25 INJECTION INTRAMUSCULAR; INTRAVENOUS at 17:08

## 2023-01-01 RX ADMIN — LEVALBUTEROL HYDROCHLORIDE 1.25 MG: 1.25 SOLUTION RESPIRATORY (INHALATION) at 07:32

## 2023-01-01 RX ADMIN — LEVALBUTEROL HYDROCHLORIDE 1.25 MG: 1.25 SOLUTION RESPIRATORY (INHALATION) at 19:00

## 2023-01-01 RX ADMIN — GUAIFENESIN 600 MG: 600 TABLET ORAL at 20:20

## 2023-01-01 RX ADMIN — IPRATROPIUM BROMIDE 0.5 MG: 0.5 SOLUTION RESPIRATORY (INHALATION) at 15:44

## 2023-01-01 RX ADMIN — DOXYCYCLINE 100 MG: 100 INJECTION, POWDER, LYOPHILIZED, FOR SOLUTION INTRAVENOUS at 11:24

## 2023-01-01 RX ADMIN — ATORVASTATIN CALCIUM 40 MG: 40 TABLET, FILM COATED ORAL at 20:13

## 2023-01-01 RX ADMIN — IPRATROPIUM BROMIDE 0.5 MG: 0.5 SOLUTION RESPIRATORY (INHALATION) at 15:32

## 2023-01-01 RX ADMIN — METHYLPREDNISOLONE SODIUM SUCCINATE 40 MG: 40 INJECTION INTRAMUSCULAR; INTRAVENOUS at 09:16

## 2023-01-01 RX ADMIN — FUROSEMIDE 80 MG: 10 INJECTION, SOLUTION INTRAVENOUS at 11:40

## 2023-01-01 RX ADMIN — POLYETHYLENE GLYCOL 3350 17 G: 17 POWDER, FOR SOLUTION ORAL at 08:11

## 2023-01-01 RX ADMIN — BUMETANIDE 2 MG: 0.25 INJECTION INTRAMUSCULAR; INTRAVENOUS at 12:01

## 2023-01-01 RX ADMIN — BUMETANIDE 1 MG/HR: 0.25 INJECTION INTRAMUSCULAR; INTRAVENOUS at 15:38

## 2023-01-01 RX ADMIN — LEVALBUTEROL HYDROCHLORIDE 1.25 MG: 1.25 SOLUTION RESPIRATORY (INHALATION) at 15:47

## 2023-01-01 RX ADMIN — FLUOXETINE 20 MG: 20 CAPSULE ORAL at 09:14

## 2023-01-01 RX ADMIN — FLUOXETINE 20 MG: 20 CAPSULE ORAL at 08:26

## 2023-01-01 RX ADMIN — MICONAZOLE NITRATE ANTIFUNGAL POWDER: 2 POWDER TOPICAL at 20:56

## 2023-01-01 RX ADMIN — BUMETANIDE 3 MG: 2 TABLET ORAL at 12:42

## 2023-01-01 RX ADMIN — SENNOSIDES AND DOCUSATE SODIUM 2 TABLET: 50; 8.6 TABLET ORAL at 08:57

## 2023-01-01 ASSESSMENT — ACTIVITIES OF DAILY LIVING (ADL)
ADLS_ACUITY_SCORE: 32
ADLS_ACUITY_SCORE: 33
ADLS_ACUITY_SCORE: 32
ADLS_ACUITY_SCORE: 31
DRESSING/BATHING_DIFFICULTY: NO
ADLS_ACUITY_SCORE: 35
ADLS_ACUITY_SCORE: 32
ADLS_ACUITY_SCORE: 32
ADLS_ACUITY_SCORE: 35
ADLS_ACUITY_SCORE: 32
EQUIPMENT_CURRENTLY_USED_AT_HOME: WALKER, STANDARD
DIFFICULTY_COMMUNICATING: NO
THE_FOLLOWING_AIDS_WERE_PROVIDED;: PATIENT DECLINED OFFER OF AUXILIARY AIDS
ADLS_ACUITY_SCORE: 32
DRESSING/BATHING_DIFFICULTY: NO
ADLS_ACUITY_SCORE: 32
CHANGE_IN_FUNCTIONAL_STATUS_SINCE_ONSET_OF_CURRENT_ILLNESS/INJURY: NO
ADLS_ACUITY_SCORE: 32
DIFFICULTY_COMMUNICATING: NO
WERE_AUXILIARY_AIDS_OFFERED?: NO
ADLS_ACUITY_SCORE: 33
ADLS_ACUITY_SCORE: 32
ADLS_ACUITY_SCORE: 32
CONCENTRATING,_REMEMBERING_OR_MAKING_DECISIONS_DIFFICULTY: NO
ADLS_ACUITY_SCORE: 32
ADLS_ACUITY_SCORE: 32
USE_OF_HEARING_ASSISTIVE_DEVICES: RIGHT HEARING AID;LEFT HEARING AID
ADLS_ACUITY_SCORE: 32
PATIENT'S_PREFERRED_MEANS_OF_COMMUNICATION: OTHER
TOILETING_ISSUES: NO
ADLS_ACUITY_SCORE: 32
ADLS_ACUITY_SCORE: 26
ADLS_ACUITY_SCORE: 32
ADLS_ACUITY_SCORE: 31
ADLS_ACUITY_SCORE: 33
ADLS_ACUITY_SCORE: 32
ADLS_ACUITY_SCORE: 31
THE_FOLLOWING_AIDS_WERE_PROVIDED;: PATIENT DECLINED OFFER OF AUXILIARY AIDS
ADLS_ACUITY_SCORE: 32
WEAR_GLASSES_OR_BLIND: YES
ADLS_ACUITY_SCORE: 33
ADLS_ACUITY_SCORE: 32
ADLS_ACUITY_SCORE: 32
ADLS_ACUITY_SCORE: 35
ADLS_ACUITY_SCORE: 32
ADLS_ACUITY_SCORE: 33
ADLS_ACUITY_SCORE: 32
ADLS_ACUITY_SCORE: 33
ADLS_ACUITY_SCORE: 32
ADLS_ACUITY_SCORE: 32
DESCRIBE_HEARING_LOSS: BILATERAL HEARING LOSS
ADLS_ACUITY_SCORE: 33
ADLS_ACUITY_SCORE: 32
ADLS_ACUITY_SCORE: 32
EQUIPMENT_CURRENTLY_USED_AT_HOME: WALKER, STANDARD
ADLS_ACUITY_SCORE: 32
WERE_AUXILIARY_AIDS_OFFERED?: NO
PATIENT'S_PREFERRED_MEANS_OF_COMMUNICATION: OTHER
ADLS_ACUITY_SCORE: 32
ADLS_ACUITY_SCORE: 31
ADLS_ACUITY_SCORE: 32
ADLS_ACUITY_SCORE: 33
ADLS_ACUITY_SCORE: 32
ADLS_ACUITY_SCORE: 33
ADLS_ACUITY_SCORE: 32
ADLS_ACUITY_SCORE: 35
USE_OF_HEARING_ASSISTIVE_DEVICES: BILATERAL HEARING AIDS
ADLS_ACUITY_SCORE: 32
HEARING_DIFFICULTY_OR_DEAF: YES
ADLS_ACUITY_SCORE: 32
ADLS_ACUITY_SCORE: 33
ADLS_ACUITY_SCORE: 33
ADLS_ACUITY_SCORE: 31
ADLS_ACUITY_SCORE: 32
ADLS_ACUITY_SCORE: 32
ADLS_ACUITY_SCORE: 33
ADLS_ACUITY_SCORE: 32
ADLS_ACUITY_SCORE: 33
ADLS_ACUITY_SCORE: 32
TOILETING_ISSUES: NO
ADLS_ACUITY_SCORE: 32
WALKING_OR_CLIMBING_STAIRS_DIFFICULTY: NO
ADLS_ACUITY_SCORE: 32
DOING_ERRANDS_INDEPENDENTLY_DIFFICULTY: YES
ADLS_ACUITY_SCORE: 32
ADLS_ACUITY_SCORE: 33
ADLS_ACUITY_SCORE: 32
ADLS_ACUITY_SCORE: 32
ADLS_ACUITY_SCORE: 26
ADLS_ACUITY_SCORE: 33
ADLS_ACUITY_SCORE: 32
ADLS_ACUITY_SCORE: 32
ADLS_ACUITY_SCORE: 26
ADLS_ACUITY_SCORE: 37
ADLS_ACUITY_SCORE: 32
CHANGE_IN_FUNCTIONAL_STATUS_SINCE_ONSET_OF_CURRENT_ILLNESS/INJURY: NO
DESCRIBE_HEARING_LOSS: HEARING LOSS ON RIGHT SIDE;HEARING LOSS ON LEFT SIDE
ADLS_ACUITY_SCORE: 32
HEARING_DIFFICULTY_OR_DEAF: YES
ADLS_ACUITY_SCORE: 32
ADLS_ACUITY_SCORE: 33
ADLS_ACUITY_SCORE: 32
ADLS_ACUITY_SCORE: 33
ADLS_ACUITY_SCORE: 32
WALKING_OR_CLIMBING_STAIRS_DIFFICULTY: NO
DEPENDENT_IADLS:: CLEANING;COOKING;LAUNDRY;MEAL PREPARATION;TRANSPORTATION
ADLS_ACUITY_SCORE: 32
FALL_HISTORY_WITHIN_LAST_SIX_MONTHS: NO
ADLS_ACUITY_SCORE: 32
ADLS_ACUITY_SCORE: 31
DIFFICULTY_EATING/SWALLOWING: NO
WEAR_GLASSES_OR_BLIND: YES
CONCENTRATING,_REMEMBERING_OR_MAKING_DECISIONS_DIFFICULTY: NO
DIFFICULTY_EATING/SWALLOWING: NO
ADLS_ACUITY_SCORE: 32
DOING_ERRANDS_INDEPENDENTLY_DIFFICULTY: YES
ADLS_ACUITY_SCORE: 31

## 2023-01-03 ENCOUNTER — DOCUMENTATION ONLY (OUTPATIENT)
Dept: ANTICOAGULATION | Facility: CLINIC | Age: 78
End: 2023-01-03

## 2023-01-03 DIAGNOSIS — I48.19 PERSISTENT ATRIAL FIBRILLATION (H): Primary | ICD-10-CM

## 2023-01-03 LAB — INR HOME MONITORING: 2.4 (ref 2–3)

## 2023-01-03 NOTE — PROGRESS NOTES
ANTICOAGULATION  MANAGEMENT-Home Monitor Managed by Exception    Buck Sinclair 77 year old male is on warfarin with therapeutic INR result. (Goal INR 2.0-3.0)    Recent labs: (last 7 days)     01/03/23  0000   INR 2.4         Previous INR was Therapeutic    Medication, diet, health changes since last INR:chart reviewed; none identified    Contacted within the last 12 weeks by phone on 10/4/22      LUCAS Jane was NOT contacted regarding therapeutic result today per home monitoring policy manage by exception agreement.   Current warfarin dose is to be continued:     Summary  As of 1/3/2023    Full warfarin instructions:  5 mg every Mon, Thu; 2.5 mg all other days   Next INR check:  1/10/2023           ?   Leana Nayak RN  Anticoagulation Clinic  1/3/2023    _______________________________________________________________________     Anticoagulation Episode Summary     Current INR goal:  2.0-3.0   TTR:  81.5 % (1 y)   Target end date:  Indefinite   Send INR reminders to:  [facility]    Indications    Persistent Atrial Fibrillation [I48.91]  Persistent atrial fibrillation (H) [I48.19]           Comments:  Home monitor ( [employer] )managed by exception         Anticoagulation Care Providers     Provider Role Specialty Phone number    Erica Hansen APRN CNP Referring Cardiovascular Disease [phone]    Domo Garrett MD Referring Cardiovascular Disease [phone]    Everett Renee MD  Cardiovascular Disease [phone]

## 2023-01-06 NOTE — PROGRESS NOTES
Pulmonary Clinic Follow-up Visit  November 16, 2022      Assessment/Plan:  77 year old male with a history of tobacco dependence in remission, CAD s/p PCI/stents, afib s/p PVL with ICD/PPM on anticoagulation, history of cavitary lesion and extensive pneumonia in LLL in Oct 2017, subsequent recurrent LLL pneumonia, since resolved, presenting for follow up. Repeat PFT's in 2020 actually showed improved FEV1/FVC ratio, stable FEV1 but substantial worsening of the DLco (almost 40% lower than in 2018). Last PFTs in 10/2022 showed a stable FEV1 but DLco is again decreased at 34% when not corrected for hemoglobin.   Lasix dose is managed by Dr. Garrett who recently added a prn dose to his 80mg BID if his weight is up.     Recent RHC data showed mPAP of 37 mm Hg, normal PCWP, CO 5.7 L/min, TPG of 21 and PVR of 4.7 BLAIR. This is consistent with group 1 PH. He could still have pulmonary arteriopathy due to some left sided heart disease as well as chronic lung disease. He is established at the [facility]'s pulmonary hypertension clinic.      Plan:  #COPD, GOLD class B (CAT >10, few exacerbations/low risk for hospitalization). Minimal smoking history so this is probably due to his work as a . PFTs 2017 showed mild obstruction and normal DLco. Mild exertional hypoxemia noted on 6MWT in 2019. Now on supplemental oxygen as of 2020.  - continue budesonide-formoterol 160-4.5 mcg two inhalations BID with spacer; rinse/gargle/spit water after use. No dose changes today.  - continue tiotropium HandiHaler one inhalation daily  - Cont albuterol as needed  - encouraged exercise, weight loss as able  - continue continuous O2 for goal O2 sat 88-92%   - declines pulmonary rehab  - no indication for LDCT as his smoking history is too minimal.   - UTD w/ pneumonia and flu, his is not sure if he wants the covid-19 booster. He was encouraged to get this.     #exertional hypoxemia, worsening DLco in 2021, significant LIMA: likely due to pulm  HTN. RHC studies as above c/w group 1 PH. Likely due to combination of left-sided heart disease and possible valvular disease as well as hypoxemic lung disease (emphysema). Significant worsening of DLco over the last 2 years. He is oxygen dependent.  - continue to follow up with pulmonary HTN at the  (Dr. Jose Morales). Greatly appreciate his expertise.   - continue Lasix 80mg bid, 40mg prn for increased weight per Dr. Garrett  - CHF precautions reinforced.   - continue oxygen as above     #LLL nodule: decreasing in size on 3 month f/u scan after abx (last scan 2019). 9mm -> 7mm and associated with scarring  - no further imaging needed unless recurrent symptoms  - continued scans at the PH clinic show this remains stable.      #Hemoptysis in the past: no further recurrence. Likely was due to the prior LLL pneumonia in the setting of anticoagulation.  - continue INR goal close to 2, as discussed with Dr. Renee   - he'll let me know if he has any concerning symptoms.    #nocturnal hypoxemia:  - wearing oxygen at night.   - previous sleep study in 2015 showed AHI of 1.4. The patient is not sure he wants to repeat this, he doesn't want to wear a CPAP.      Follow up in 6 months.    Flakita Slaughter, CNP  Pulmonary Medicine  [facility]   [phone]      CCx: follow up of COPD GOLD class D, chronic hypoxemic respiratory failure requiring supplemental oxygen, CHF with volume overload.    HPI: Interim history: Last seen in clinic by Dr. Payton on 5/6/2022.  He is using his oxygen continuously. Using all inhaled medications as prescribed.   Being followed for pulmonary HTN at the .   He was in a study for inhaled Tyvaso. This was discontinued per patient.    He is doing the same. No change in symptoms. SOB is the same, he is able to tolerate minimal activity.   He thinks his breathing is better. His wife is thinks his voice quality has improved since being in the study.  His edema is better.   No acute illness symptoms  since he was last.   [facility], copies of this last PFT and 6 minute walk test supplied so they will give him his oxygen supplies and they don't have to rent them.     ROS:  A 12-system review was obtained and was negative with the exception of the symptoms endorsed in the history of present illness.    PMH:  Past Medical History:   Diagnosis Date     Anemia      Asthma without status asthmaticus 5/5/2021     BPH (benign prostatic hyperplasia)      Cardiomyopathy (H)      COPD, group B, by GOLD 2017 classification (H)      Coronary artery disease due to calcified coronary lesion      Dyslipidemia, goal LDL below 70      Essential hypertension      History of transfusion      Persistent atrial fibrillation (H)      Pneumonia of left lower lobe due to infectious organism 10/4/2017     Skin cancer of trunk      Status post catheter ablation of atrial fibrillation 6/7/2017    PVI 4-2011 (Cryo/PVI + roof line + CTI line) Re-do PVI 7-2011 (RFA/PVI + CFE + VIDYA + confirmed CTI line)     Ventricular tachycardia        PSH:  Past Surgical History:   Procedure Laterality Date     CARDIAC DEFIBRILLATOR PLACEMENT       CARDIOVERSION  07/11/2018    x20, last 2/12/15, 10/2015, 11/18/16, 6/16/17 by Lauren Foster CNP     CARDIOVERSION  07/11/2018     CARDIOVERSION  11/19/2021     COLONOSCOPY N/A 4/28/2017    Procedure: COLONOSCOPY with 2 ascending polyps and 1 transverse polyp;  Surgeon: Jose Whittington MD;  Location: [facility] GI;  Service:      CV CORONARY ANGIOGRAM N/A 9/20/2017    Procedure: Coronary Angiogram;  Surgeon: Sergio Cervantes MD;  Location: [facility] Cath Lab;  Service:      CV CORONARY ANGIOGRAM N/A 1/24/2022    Procedure: Coronary Angiogram;  Surgeon: Christi Saunders MD;  Location: [facility] CATH LAB CV     CV LEFT HEART CATH N/A 1/24/2022    Procedure: Left Heart Cath;  Surgeon: Christi Saunders MD;  Location: [facility] CATH [facility] CV     CV RIGHT AND  LEFT HEART CATH N/A 1/24/2022    Procedure: Right and Left Heart Catherization;  Surgeon: Christi Saunders MD;  Location: [facility] CATH LAB CV     EP ICD GENERATOR REPLACEMENT DUAL N/A 10/28/2022    Procedure: Implantable Cardioverter Defibrillator Generator Replacement Dual;  Surgeon: Elo Collins MD;  Location: [facility] CATH [facility] CV     EP ICD INSERT       FRACTURE SURGERY Left     wrist     INGUINAL HERNIA REPAIR Left 1967    while in the [employer] in [place] after 13 month in [place]     INSERT / REPLACE / REMOVE PACEMAKER       IR MISCELLANEOUS PROCEDURE  4/30/2014     OTHER SURGICAL HISTORY      left hand surgery---tendon repair     AR ABLATE HEART DYSRHYTHM FOCUS  04/2011    Catheter Ablation Atrial Fibrillation PVI Apr 2011 (Cryo+RF-PVI + roof line + CTI line)     AR ABLATE HEART DYSRHYTHM FOCUS  07/2011    Re-do PVI Jul 2011 (RFA-PVI + CFE + VIDYA + confirmation of CTI line)     TOTAL SHOULDER REPLACEMENT Right 03/03/2016    Dr. Abernathy of [facility]     WRIST SURGERY Left      ZZC MYERS W/O FACETEC FORAMOT/DSKC 1/2 VRT SEG, CERVICAL      Laminectomy Lumbar;  Recorded: 03/09/2012;       Allergies:     Allergies   Allergen Reactions     Adhesive Tape Other (See Comments)     ADHESIVE TAPE; SKIN IRRITATION; Skin pulled off with foam tape       Amiodarone      ADVERSE REACTION.  Sunlight sensitivity.     Lisinopril          Family HX:  Family History   Problem Relation Age of Onset     Cancer Mother         leukemia     Cancer Father         bladder     Cancer Sister         breast with lung met.     Aneurysm Sister      CABG Brother      CABG Brother      Valvular heart disease Brother         valve replacement       Social Hx:  Social History     Socioeconomic History     Marital status:      Spouse name: Neela     Number of children: Not on file     Years of education: 12     Highest education level: Not on file   Occupational History     Occupation:      Employer: RETIRED      Occupation: [employer]     Comment: [place]   Social Needs     Financial resource strain: Not on file     Food insecurity     Worry: Not on file     Inability: Not on file     Transportation needs     Medical: Not on file     Non-medical: Not on file   Tobacco Use     Smoking status: Former Smoker     Packs/day: 1.00     Years: 4.00     Pack years: 4.00     Types: Cigarettes     Quit date: 1968     Years since quittin.3     Smokeless tobacco: Never Used   Substance and Sexual Activity     Alcohol use: Yes     Alcohol/week: 2.0 standard drinks     Types: 2 Cans of beer per week     Comment: 1 beer per week     Drug use: No     Sexual activity: Yes     Partners: Female     Birth control/protection: Post-menopausal   Lifestyle     Physical activity     Days per week: Not on file     Minutes per session: Not on file     Stress: Not on file   Relationships     Social connections     Talks on phone: Not on file     Gets together: Not on file     Attends Congregation service: Not on file     Active member of club or organization: Not on file     Attends meetings of clubs or organizations: Not on file     Relationship status: Not on file     Intimate partner violence     Fear of current or ex partner: Not on file     Emotionally abused: Not on file     Physically abused: Not on file     Forced sexual activity: Not on file   Other Topics Concern     Not on file   Social History Narrative     Not on file       Current Meds:  Current Outpatient Medications   Medication     acetaminophen (TYLENOL) 325 MG tablet     albuterol (PROAIR HFA/PROVENTIL HFA/VENTOLIN HFA) 108 (90 Base) MCG/ACT inhaler     Ascorbic Acid (VITAMIN C) 500 MG CAPS     atorvastatin (LIPITOR) 40 MG tablet     budesonide-formoterol (SYMBICORT) 160-4.5 MCG/ACT Inhaler     co-enzyme Q-10 100 MG CAPS capsule     fish oil-omega-3 fatty acids 1000 MG capsule     FLUoxetine (PROZAC) 20 MG capsule     furosemide (LASIX) 80 MG tablet     losartan (COZAAR)  50 MG tablet     MAG64 64 MG TBEC CR tablet     multivitamin, therapeutic (THERA-VIT) TABS tablet     nitroGLYcerin (NITROSTAT) 0.4 MG sublingual tablet     OXYGEN-HELIUM IN     polyethylene glycol (MIRALAX) 17 GM/Dose powder     sotalol (BETAPACE) 160 MG tablet     tiotropium (SPIRIVA) 18 MCG inhaled capsule     vitamin D2 (ERGOCALCIFEROL) 18005 units (1250 mcg) capsule     warfarin ANTICOAGULANT (COUMADIN) 2.5 MG tablet     No current facility-administered medications for this visit.       Physical Exam:  /60 (BP Location: Right arm, Patient Position: Chair, Cuff Size: Adult Large)   Pulse 73   Wt 123.3 kg (271 lb 14.4 oz)   SpO2 93%   BMI 33.99 kg/m  Gen: alert, oriented, no distress    Physical Exam  Constitutional:       General: He is not in acute distress.     Appearance: He is not ill-appearing or diaphoretic.      Comments: Walking with a walker, on pulse dose oxygen     HENT:      Nose: Nose normal.   Cardiovascular:      Rate and Rhythm: Normal rate and regular rhythm.      Pulses: Normal pulses.      Heart sounds: Normal heart sounds.   Pulmonary:      Effort: Pulmonary effort is normal. No respiratory distress.      Breath sounds: Normal breath sounds. No wheezing or rhonchi.   Musculoskeletal:      Right lower leg: Edema (mild R>L) present.      Left lower leg: Edema (mil) present.   Skin:     General: Skin is warm and dry.      Findings: No rash.   Neurological:      Mental Status: He is alert.   Psychiatric:         Behavior: Behavior normal.       Labs:  Reviewed  Dec 2018  Chem panel wnl  TSH wnl  hgb 12.1 Oct 2018    Previous testing  DONNA neg  blasto neg  Histo testing neg  c-ANCA neg  HIV neg  RF neg    Bronch/LLL BAL cultures neg    Imaging studies:  CT chest April 2018  IMPRESSION:   CONCLUSION:  1.  Mild scarring and slight bronchiectasis present at both lung bases. No active pneumonia identified.    CT chest 7/15/2019  IMPRESSION:   CONCLUSION:   1.  Nodular opacity of the left  lower lobe of interest on 04/17/2019 is less conspicuous today and probably explained by scarring. Additional 6 month chest CT follow-up is recommended.  2.  Emphysema and scattered areas of scarring elsewhere in both lungs.  3.  Advanced multivessel coronary artery disease.    CT chest without contrast 8/17/2022  INDICATION: PERFECT trial.                                                   IMPRESSION: Prominent centrilobular emphysema especially in the upper  lobes with subpleural interstitial lung disease especially in the left  upper lobe. Probable atelectatic opacity in the left lung base not  significantly changed. ICD. Main pulmonary artery enlargement  suggesting possibility of pulmonary artery hypertension.  Atherosclerosis. Cardiomegaly.    Echo Dec 2021  Interpretation Summary     1. Left ventricular systolic function is normal. The left ventricle is normal  in size. There is normal left ventricular wall thickness.Left ventricular  diastolic function is normal. No regional wall motion abnormalities noted.  2. The right ventricle is mildly dilated. Mildly decreased right ventricular  systolic functio.  3. There is mild to moderate (1-2+) tricuspid regurgitation.  4. The right ventricular systolic pressure is approximated at 45.7mmHg plus  the right atrial pressure.Right ventricular systolic pressure is elevated,  consistent with moderate pulmonary hypertension. Normal RA pressure of 3 mmHg.  6. The ascending aorta is mildly dilated at 42 mm.    RHC 1/24/2022    Exertional dyspnea in a patient with known CAD and severe COPD.    Mild coronary artery disease with patent LAD stents.    LVEDP and PCWP are normal. The PA pressure was elevated at 66/24 mmHg with a mean pressure of 37 mmHg. See numbers below.    Shikha and TD cardiac outputs were both 5.7 l/min.    Sats on 2L NC oxygen: Ao 93%, PA 60%, RA 59%        PFTs 2018  FEV1/FVC is 0.56 and is reduced.  FEV1 is 73% predicted and is reduced.  FVC is 96%  predicted and normal.  There was no improvement in spirometry after a single inhaled dose of bronchodilator.  TLC is 99% predicted and is normal.  RV is 113% predicted and is normal.  DLCO is 93% predicted and is normal when it   is corrected for hemoglobin.  Impression:  Full Pulmonary Function Test is abnormal.  PFTs are consistent with mild obstructive disease.  Spirometry is not consistent with reversibility.  There is no hyperinflation.  There is no air-trapping.  Diffusion capacity when corrected for hemoglobin is normal.  Declines in both FEV1 and TLC since 2009 with overall preservation of diffusing capacity.    PFTs 11/10/2021  FEV1 2.42L 68%  FVC 76%  No BD response  Ratio 0.66 (LLN 0.6)  DLco 46% han for hgb  Flow vol curve suggests obstruction.  Impression: no obstruction based on ratio >LLN but FV curve does suggest some obstruction. Significant decline in DLco compared to DLco.     PFTs 10/27/2022  FEV1 1.98L 57%  FVC 3.23L 68%  FEV1/FVC 61%  DLCO not corrected 34%  The FVC and FEV1 are reduced, but the FEV1/FVC ratio is within normal limits.  The FEV1/FEV6 ratio is reduced.  The inspiratory flow rates are within normal limits.  The TLC is reduced.  The diffusing capacity is reduced.  However, the diffusing capacity was not corrected for the patient's hemoglobin.   IMPRESSION:   Mixed Moderately severe airflow obstruction and restriction.   Severe diffusion defect.  The diffusing capacity was not corrected for the patient's hemoglobin.     July 2019  SIX MINUTE WALK TEST / HOME O2 EVALUATION  SpO2 at rest on RA 96%  SpO2 started to drop after 2 1/2 minutes walking. SpO2 after walking 2 1/2 minutes on RA was 88%  SpO2 after walking 6 minutes on RA was 87%  Distance covered 320.04 meters.  Recovery phase, SpO2 after 1 minute rest on RA was 87%  Recovery phase, SpO2 after 2 minute rest on RA was 97%  Impression:   Significant desaturation with activities.   Patient does qualify for O2 supplementation  with activity.    Sleep Study 5/10/2015  [place] Sleepiness Score =  17  Mallampati Class =   4  Neck Circumference =   17.75 inches  BMI =      32.9  STOP-BANG Scale =   6/8  Primary Findings:  1- Respiratory monitoring showed intermittent snoring but overall no significant obstructive sleep apnea/hypopnea sufficient to disturb sleep (AHI=1.4).  2- Supine sleep was not sampled during the study but the patient does not usually sleep in this position due to shoulder problems.   Other Findings:  Sleep Architecture:   - Sleep onset latency was normal.  - REM onset latency was prolonged.  - All stages of sleep were sampled during the test.  Respiration:  - Mean hemoglobin oxygen saturation (SaO2) during the diagnostic portion of the PSG in NREM and REM sleep was 94% with a SaO2 desaturation eri observed to 89%.  - Sleep-related Hypoxemia was not present.  Movements:      - The patient had a Periodic Limb Movement (PLM) index of 146.8 and the MYRNA PLM index was 6.4  Parasomnia:  - No activity consistent with parasomnia was observed in the video recording.  Electrocardiogram:  - Two-lead ECG showed atrial fibrillation with PVCs.  Electroencephalogram:  - We used a limited-montage EEG with F3, F4, C3, C4, O1, O2 and contra-lateral references to M1 and M2 that was reviewed using a 30-second EPOCH. No EEG abnormalities were observed with these settings.  Diagnosis:  -   780.54 Hypersomnia unspecified.  Assessment & Recommendation:   The results from this study are not sufficient to explain the patient s hypersomnia. Measures aimed at reducing upper airway resistance are recommended for the degree of sleep-disordered breathing that was observed in this study. Options include: positional therapy to avoid sleep in the supine posture, ENT evaluation for surgery, and dental evaluation for an oral appliance. Patients with excessive daytime sleepiness are at increased risk for motor vehicle accidents.  Shivam Saul MD  Staff  Physician- [facility]          Spironolactone Counseling: Patient advised regarding risks of diarrhea, abdominal pain, hyperkalemia, birth defects (for female patients), liver toxicity and renal toxicity. The patient may need blood work to monitor liver and kidney function and potassium levels while on therapy. The patient verbalized understanding of the proper use and possible adverse effects of spironolactone.  All of the patient's questions and concerns were addressed.

## 2023-01-10 ENCOUNTER — TELEPHONE (OUTPATIENT)
Dept: CARDIOLOGY | Facility: CLINIC | Age: 78
End: 2023-01-10

## 2023-01-10 ENCOUNTER — DOCUMENTATION ONLY (OUTPATIENT)
Dept: ANTICOAGULATION | Facility: CLINIC | Age: 78
End: 2023-01-10

## 2023-01-10 DIAGNOSIS — I48.19 PERSISTENT ATRIAL FIBRILLATION (H): Primary | ICD-10-CM

## 2023-01-10 LAB — INR HOME MONITORING: 2.5 (ref 2–3)

## 2023-01-10 NOTE — PROGRESS NOTES
Noted.  -----------------------------------------    Domo Garrett MD  You; Jaclyn Zaman RN 52 minutes ago (2:01 PM)     BW  Agree with extra dose of diuretic until his weight is back to baseline.      Jaclyn Zaman RN White, Brent, MD 1 hour ago (1:47 PM)     MG  Any other recommendations? Thanks!

## 2023-01-10 NOTE — TELEPHONE ENCOUNTER
----- Message from Monique Lin sent at 1/9/2023  8:33 AM CST -----  Regarding: device RN review  No charge. No Epic interface required.  Type: alert remote pacemaker transmission for Possible fluid accumulation.  Presenting rhythm: AP-VS 60 bpm.  Battery/lead status: stable.  Arrhythmias: since 11/7/22; 98 mode switch episodes, longest lasting 10 minutes, EGMs appear to be very fine AT/AF, burden 0.60%. No ventricular arrhythmias detected.  Anticoagulant: warfarin.  Comments: normal pacemaker function. Routed to device RN to review HF diagnostics. LINDA Lin, Device Specialist    Optivol elevated

## 2023-01-10 NOTE — TELEPHONE ENCOUNTER
"No charge. No Epic interface required.  Type: alert remote pacemaker transmission for Possible fluid accumulation.  Presenting rhythm: AP-VS 60 bpm.  Battery/lead status: stable.  Arrhythmias: since 11/7/22; 98 mode switch episodes, longest lasting 10 minutes, EGMs appear to be very fine AT/AF, burden 0.60%. No ventricular arrhythmias detected.  Anticoagulant: warfarin.  Comments: normal pacemaker function. Routed to device RN to review HF diagnostics. E. Pivec, Device Specialist      OptiVol results reviewed (below). Slow gradual rise noted in Fluid Index- level just crossing threshold today, indicating possible start of fluid accumulation. Call placed to patient to review findings. Patient states they did travel over the holidays and new Years and were away from their home cooking which is usually very low in sodium. State he did notice a slight weight change over those few days and took \"an extra 1/2 of water pill.\" He denies any notable edema, shortness of breath, or significant weight gain today. States he will take an extra 1/2 water pill today as well.     Advised to continue watch sodium intake and monitoring daily weights closely, adjusting Lasix as needed. If continues to need extra Lasix daily to notify his Cardiologist. Also advised to call with any increased edema, shortness of breath or any activity intolerance. Patient verbalized understanding. Wife was also present during this conversation and understands.     Will update Dr. Garrett for any other recommendations.  Jaclyn Zaman RN        "

## 2023-01-10 NOTE — PROGRESS NOTES
ANTICOAGULATION  MANAGEMENT-Home Monitor Managed by Exception    Buck Sinclair 77 year old male is on warfarin with therapeutic INR result. (Goal INR 2.0-3.0)    Recent labs: (last 7 days)     01/10/23  0000   INR 2.5         Previous INR was Therapeutic    Medication, diet, health changes since last INR:chart reviewed; none identified    Contacted within the last 12 weeks by phone on 10/4/22      LUCAS     Buck was NOT contacted regarding therapeutic result today per home monitoring policy manage by exception agreement.   Current warfarin dose is to be continued:     Summary  As of 1/10/2023    Full warfarin instructions:  5 mg every Mon, Thu; 2.5 mg all other days   Next INR check:  1/17/2023           ?   Leana Nayak RN  Anticoagulation Clinic  1/10/2023    _______________________________________________________________________     Anticoagulation Episode Summary     Current INR goal:  2.0-3.0   TTR:  81.5 % (1 y)   Target end date:  Indefinite   Send INR reminders to:  [facility]    Indications    Persistent Atrial Fibrillation [I48.91]  Persistent atrial fibrillation (H) [I48.19]           Comments:  Home monitor ( [employer] )managed by exception         Anticoagulation Care Providers     Provider Role Specialty Phone number    Erica Hansen APRN CNP Referring Cardiovascular Disease [phone]    Domo Garrett MD Referring Cardiovascular Disease [phone]    Everett Renee MD  Cardiovascular Disease [phone]

## 2023-01-16 DIAGNOSIS — I50.9 CONGESTIVE HEART FAILURE, UNSPECIFIED HF CHRONICITY, UNSPECIFIED HEART FAILURE TYPE (H): ICD-10-CM

## 2023-01-16 RX ORDER — FUROSEMIDE 80 MG
80 TABLET ORAL 2 TIMES DAILY
Qty: 90 TABLET | Refills: 0 | Status: SHIPPED | OUTPATIENT
Start: 2023-01-16 | End: 2023-01-20

## 2023-01-17 ENCOUNTER — ANTICOAGULATION THERAPY VISIT (OUTPATIENT)
Dept: ANTICOAGULATION | Facility: CLINIC | Age: 78
End: 2023-01-17

## 2023-01-17 DIAGNOSIS — I48.19 PERSISTENT ATRIAL FIBRILLATION (H): ICD-10-CM

## 2023-01-17 DIAGNOSIS — I48.91 ATRIAL FIBRILLATION (H): Primary | Chronic | ICD-10-CM

## 2023-01-17 LAB — INR HOME MONITORING: 2.6 (ref 2–3)

## 2023-01-17 NOTE — PROGRESS NOTES
ANTICOAGULATION MANAGEMENT     Buck Sinclair 77 year old male is on warfarin with therapeutic INR result. (Goal INR 2.0-3.0)    Recent labs: (last 7 days)     01/17/23  0000   INR 2.6       ASSESSMENT       Source(s): Chart Review and Patient/Caregiver Call       Warfarin doses taken: Warfarin taken as instructed    Diet: No new diet changes identified    New illness, injury, or hospitalization: No    Medication/supplement changes: None noted    Signs or symptoms of bleeding or clotting: No    Previous INR: Therapeutic last 2(+) visits    Additional findings: None     12 week check in       PLAN     Recommended plan for no diet, medication or health factor changes affecting INR     Dosing Instructions: Continue your current warfarin dose with next INR in 1 week       Summary  As of 1/17/2023    Full warfarin instructions:  5 mg every Mon, Thu; 2.5 mg all other days   Next INR check:  1/24/2023             Telephone call with Buck who verbalizes understanding and agrees to plan    Patient to recheck with home meter    Education provided:     Resume manage by exception with home monitor. Continue to submit INR results to home monitor company.You will only be called when your result is out of range. Please call and notify [facility] if new medication started, dose missed, signs or symptoms of bleeding or clotting, or a surgery/procedure is scheduled.    Plan made per [facility] anticoagulation protocol    Clarisa Ortiz, RN  Anticoagulation Clinic  1/17/2023    _______________________________________________________________________     Anticoagulation Episode Summary     Current INR goal:  2.0-3.0   TTR:  81.5 % (1 y)   Target end date:  Indefinite   Send INR reminders to:  [facility]    Indications    Persistent Atrial Fibrillation [I48.91]  Persistent atrial fibrillation (H) [I48.19]           Comments:  Home monitor ( [employer] )managed by exception         Anticoagulation Care Providers     Provider Role  Specialty Phone number    Erica Hansen APRN CNP Referring Cardiovascular Disease [phone]    Domo Garrett MD Referring Cardiovascular Disease [phone]    Everett Renee MD  Cardiovascular Disease [phone]

## 2023-01-20 ENCOUNTER — TELEPHONE (OUTPATIENT)
Dept: CARDIOLOGY | Facility: CLINIC | Age: 78
End: 2023-01-20
Payer: COMMERCIAL

## 2023-01-20 DIAGNOSIS — I50.9 CONGESTIVE HEART FAILURE, UNSPECIFIED HF CHRONICITY, UNSPECIFIED HEART FAILURE TYPE (H): ICD-10-CM

## 2023-01-20 RX ORDER — FUROSEMIDE 80 MG
80 TABLET ORAL 2 TIMES DAILY
Qty: 180 TABLET | Refills: 0 | Status: SHIPPED | OUTPATIENT
Start: 2023-01-20 | End: 2023-01-20

## 2023-01-20 RX ORDER — FUROSEMIDE 80 MG
80 TABLET ORAL 2 TIMES DAILY
Qty: 180 TABLET | Refills: 0 | Status: SHIPPED | OUTPATIENT
Start: 2023-01-20 | End: 2023-01-20 | Stop reason: DRUGHIGH

## 2023-01-20 NOTE — TELEPHONE ENCOUNTER
OK per BEW, for an extra 40mg dose of Lasix prn, if he retains extra fluid. Verified this change with FENG [place] that the script should read:  Furosemide 80mg twice daily, with an extra 40mg dose prn if retains fluid.  Will change script to reflect this change. jl

## 2023-01-20 NOTE — TELEPHONE ENCOUNTER
[facility] Call Center    Phone Message    May a detailed message be left on voicemail: yes     Reason for Call: Medication Refill Request    Has the patient contacted the pharmacy for the refill? Yes   Name of medication being requested: furosemide (LASIX)     Provider who prescribed the medication: janine Payton- was told to contact Dr. Garrett's team to adjust    Pharmacy: [id number] [place] [address] [address] [facility] [address]  Date medication is needed:     Pt's medication dosage was changed, janine Keita, from 80MG 2x daily to 120MG in AM and 80MG in PM.      Pt needed an updated Rx.  Please call with questions and send Rx.     Action Taken: Other: cardio    Travel Screening: Not Applicable

## 2023-01-24 ENCOUNTER — DOCUMENTATION ONLY (OUTPATIENT)
Dept: ANTICOAGULATION | Facility: CLINIC | Age: 78
End: 2023-01-24
Payer: COMMERCIAL

## 2023-01-24 DIAGNOSIS — I48.19 PERSISTENT ATRIAL FIBRILLATION (H): Primary | ICD-10-CM

## 2023-01-24 LAB — INR HOME MONITORING: 2.5 (ref 2–3)

## 2023-01-24 NOTE — PROGRESS NOTES
ANTICOAGULATION  MANAGEMENT-Home Monitor Managed by Exception    Buck JONES Sinclair 77 year old male is on warfarin with therapeutic INR result. (Goal INR 2.0-3.0)    Recent labs: (last 7 days)     01/24/23  0000   INR 2.5         Previous INR was Therapeutic    Medication, diet, health changes since last INR:chart reviewed; none identified    Contacted within the last 12 weeks by phone on 1/17/23      LUCAS     Buck was NOT contacted regarding therapeutic result today per home monitoring policy manage by exception agreement.   Current warfarin dose is to be continued:     Summary  As of 1/24/2023    Full warfarin instructions:  5 mg every Mon, Thu; 2.5 mg all other days   Next INR check:  2/7/2023           ?   Leana Nayak RN  Anticoagulation Clinic  1/24/2023    _______________________________________________________________________     Anticoagulation Episode Summary     Current INR goal:  2.0-3.0   TTR:  82.2 % (1 y)   Target end date:  Indefinite   Send INR reminders to:  [facility]    Indications    Persistent Atrial Fibrillation [I48.91]  Persistent atrial fibrillation (H) [I48.19]           Comments:  Home monitor ( [employer] )managed by exception         Anticoagulation Care Providers     Provider Role Specialty Phone number    Erica Hansen APRN CNP Referring Cardiovascular Disease [phone]    Domo Garrett MD Referring Cardiovascular Disease [phone]    Everett Renee MD  Cardiovascular Disease [phone]

## 2023-01-31 ENCOUNTER — DOCUMENTATION ONLY (OUTPATIENT)
Dept: ANTICOAGULATION | Facility: CLINIC | Age: 78
End: 2023-01-31
Payer: COMMERCIAL

## 2023-01-31 DIAGNOSIS — I48.91 ATRIAL FIBRILLATION (H): Primary | Chronic | ICD-10-CM

## 2023-01-31 DIAGNOSIS — I48.19 PERSISTENT ATRIAL FIBRILLATION (H): ICD-10-CM

## 2023-01-31 LAB — INR HOME MONITORING: 2.4 (ref 2–3)

## 2023-01-31 NOTE — PROGRESS NOTES
ANTICOAGULATION  MANAGEMENT-Home Monitor Managed by Exception    Buck Sinclair 77 year old male is on warfarin with therapeutic INR result. (Goal INR 2.0-3.0)    Recent labs: (last 7 days)     01/31/23  0000   INR 2.4         Previous INR was Therapeutic    Medication, diet, health changes since last INR:chart reviewed; none identified    Contacted within the last 12 weeks by phone on 01/17/2023          LUCAS Jane was NOT contacted regarding therapeutic result today per home monitoring policy manage by exception agreement.   Current warfarin dose is to be continued:     Summary  As of 1/31/2023    Full warfarin instructions:  5 mg every Mon, Thu; 2.5 mg all other days   Next INR check:  2/7/2023           ?   Clarisa Ortiz RN  Anticoagulation Clinic  1/31/2023    _______________________________________________________________________     Anticoagulation Episode Summary     Current INR goal:  2.0-3.0   TTR:  84.1 % (1 y)   Target end date:  Indefinite   Send INR reminders to:  [facility]    Indications    Persistent Atrial Fibrillation [I48.91]  Persistent atrial fibrillation (H) [I48.19]           Comments:  Home monitor ( [employer] )managed by exception         Anticoagulation Care Providers     Provider Role Specialty Phone number    Erica Hansen APRN CNP Referring Cardiovascular Disease [phone]    Domo Garrett MD Referring Cardiovascular Disease [phone]    Everett Renee MD  Cardiovascular Disease [phone]

## 2023-02-07 ENCOUNTER — DOCUMENTATION ONLY (OUTPATIENT)
Dept: ANTICOAGULATION | Facility: CLINIC | Age: 78
End: 2023-02-07

## 2023-02-07 ENCOUNTER — ANCILLARY PROCEDURE (OUTPATIENT)
Dept: CARDIOLOGY | Facility: CLINIC | Age: 78
End: 2023-02-07
Attending: INTERNAL MEDICINE
Payer: COMMERCIAL

## 2023-02-07 DIAGNOSIS — I42.9 CARDIOMYOPATHY (H): ICD-10-CM

## 2023-02-07 DIAGNOSIS — I48.19 PERSISTENT ATRIAL FIBRILLATION (H): Primary | ICD-10-CM

## 2023-02-07 DIAGNOSIS — Z95.810 ICD (IMPLANTABLE CARDIOVERTER-DEFIBRILLATOR) IN PLACE: ICD-10-CM

## 2023-02-07 LAB — INR HOME MONITORING: 2.9 (ref 2–3)

## 2023-02-07 NOTE — PROGRESS NOTES
ANTICOAGULATION  MANAGEMENT-Home Monitor Managed by Exception    Buck JONES Sinclair 77 year old male is on warfarin with therapeutic INR result. (Goal INR 2.0-3.0)    Recent labs: (last 7 days)     02/07/23  0000   INR 2.9         Previous INR was Therapeutic    Medication, diet, health changes since last INR:chart reviewed; none identified    Contacted within the last 12 weeks by phone on 1/17/23      LUCAS     Buck was NOT contacted regarding therapeutic result today per home monitoring policy manage by exception agreement.   Current warfarin dose is to be continued:     Summary  As of 2/7/2023    Full warfarin instructions:  5 mg every Mon, Thu; 2.5 mg all other days   Next INR check:  2/14/2023           ?   Leana Nayak RN  Anticoagulation Clinic  2/7/2023    _______________________________________________________________________     Anticoagulation Episode Summary     Current INR goal:  2.0-3.0   TTR:  84.8 % (1 y)   Target end date:  Indefinite   Send INR reminders to:  [facility]    Indications    Persistent Atrial Fibrillation [I48.91]  Persistent atrial fibrillation (H) [I48.19]           Comments:  Home monitor ( [employer] )managed by exception         Anticoagulation Care Providers     Provider Role Specialty Phone number    Erica Hansen APRN CNP Referring Cardiovascular Disease [phone]    Domo Garrett MD Referring Cardiovascular Disease [phone]    Everett Renee MD  Cardiovascular Disease [phone]

## 2023-02-12 DIAGNOSIS — I50.32 CHRONIC HEART FAILURE WITH PRESERVED EJECTION FRACTION (H): ICD-10-CM

## 2023-02-13 RX ORDER — MAGNESIUM 64 MG (MAGNESIUM CHLORIDE) TABLET,DELAYED RELEASE
Qty: 180 TABLET | Refills: 1 | Status: SHIPPED | OUTPATIENT
Start: 2023-02-13 | End: 2023-01-01

## 2023-02-14 ENCOUNTER — DOCUMENTATION ONLY (OUTPATIENT)
Dept: ANTICOAGULATION | Facility: CLINIC | Age: 78
End: 2023-02-14
Payer: COMMERCIAL

## 2023-02-14 DIAGNOSIS — I48.19 PERSISTENT ATRIAL FIBRILLATION (H): Primary | Chronic | ICD-10-CM

## 2023-02-14 LAB — INR HOME MONITORING: 3 (ref 2–3)

## 2023-02-14 NOTE — PROGRESS NOTES
ANTICOAGULATION  MANAGEMENT-Home Monitor Managed by Exception    Buck Sinclair 77 year old male is on warfarin with therapeutic INR result. (Goal INR 2.0-3.0)    Recent labs: (last 7 days)     02/14/23  0000   INR 3.0         Previous INR was Therapeutic    Medication, diet, health changes since last INR:chart reviewed; none identified    Contacted within the last 12 weeks by phone on 1/17/2023      LUCAS Jane was NOT contacted regarding therapeutic result today per home monitoring policy manage by exception agreement.   Current warfarin dose is to be continued:     Summary  As of 2/14/2023    Full warfarin instructions:  5 mg every Mon, Thu; 2.5 mg all other days   Next INR check:  2/21/2023           ?   Gisele Prince RN  Anticoagulation Clinic  2/14/2023    _______________________________________________________________________     Anticoagulation Episode Summary     Current INR goal:  2.0-3.0   TTR:  84.8 % (1 y)   Target end date:  Indefinite   Send INR reminders to:  [facility]    Indications    Persistent Atrial Fibrillation [I48.91]  Persistent atrial fibrillation (H) [I48.19]           Comments:  Home monitor ( [employer] )managed by exception         Anticoagulation Care Providers     Provider Role Specialty Phone number    Erica Hansen APRN CNP Referring Cardiovascular Disease [phone]    Domo Garrett MD Referring Cardiovascular Disease [phone]    Everett Renee MD  Cardiovascular Disease [phone]

## 2023-02-21 ENCOUNTER — ANTICOAGULATION THERAPY VISIT (OUTPATIENT)
Dept: ANTICOAGULATION | Facility: CLINIC | Age: 78
End: 2023-02-21
Payer: COMMERCIAL

## 2023-02-21 DIAGNOSIS — I48.19 PERSISTENT ATRIAL FIBRILLATION (H): Primary | Chronic | ICD-10-CM

## 2023-02-21 LAB — INR HOME MONITORING: 3.3 (ref 2–3)

## 2023-02-21 NOTE — PROGRESS NOTES
ANTICOAGULATION MANAGEMENT     Buck Sinclair 78 year old male is on warfarin with supratherapeutic INR result. (Goal INR 2.0-3.0)    Recent labs: (last 7 days)     02/21/23  0000   INR 3.3*       ASSESSMENT       Source(s): Chart Review and Patient/Caregiver Call       Warfarin doses taken: Warfarin taken as instructed    Diet: Change in alcohol intake may be affecting INR. during the weekend  to celebrate his birthday.    New illness, injury, or hospitalization: No    Medication/supplement changes: None noted    Signs or symptoms of bleeding or clotting: No    Previous INR: Therapeutic last 2(+) visits    Additional findings: None       PLAN     Recommended plan for temporary change(s) affecting INR     Dosing Instructions: Continue your current warfarin dose per Neela she will have the patient eat some bruTechLivets tonight~  with next INR in 1 week       Summary  As of 2/21/2023    Full warfarin instructions:  5 mg every Mon, Thu; 2.5 mg all other days   Next INR check:  2/28/2023             Telephone call with  patient's spouse Neela who verbalizes understanding and agrees to plan and who agrees to plan and repeated back plan correctly    Patient to recheck with home meter    Education provided:     Goal range and lab monitoring: goal range and significance of current result, Importance of therapeutic range and Importance of following up at instructed interval    Dietary considerations: impact of vitamin K foods on INR and vitamin K content of foods    Plan made per [facility] anticoagulation protocol    Gisele Prince RN  Anticoagulation Clinic  2/21/2023    _______________________________________________________________________     Anticoagulation Episode Summary     Current INR goal:  2.0-3.0   TTR:  84.0 % (1 y)   Target end date:  Indefinite   Send INR reminders to:  [facility]    Indications    Persistent Atrial Fibrillation [I48.91]  Persistent atrial fibrillation (H) [I48.19]            Comments:  Home monitor ( [employer] )managed by exception         Anticoagulation Care Providers     Provider Role Specialty Phone number    Erica Hansen APRN CNP Referring Cardiovascular Disease [phone]    Domo Garrett MD Referring Cardiovascular Disease [phone]    Everett Renee MD  Cardiovascular Disease [phone]

## 2023-02-28 ENCOUNTER — ANTICOAGULATION THERAPY VISIT (OUTPATIENT)
Dept: ANTICOAGULATION | Facility: CLINIC | Age: 78
End: 2023-02-28
Payer: COMMERCIAL

## 2023-02-28 DIAGNOSIS — I48.19 PERSISTENT ATRIAL FIBRILLATION (H): Primary | ICD-10-CM

## 2023-02-28 LAB — INR HOME MONITORING: 2.9 (ref 2–3)

## 2023-02-28 NOTE — PROGRESS NOTES
ANTICOAGULATION MANAGEMENT     Buck Sinclair 78 year old male is on warfarin with therapeutic INR result. (Goal INR 2.0-3.0)    Recent labs: (last 7 days)     02/28/23  0000   INR 2.9       ASSESSMENT       Source(s): Chart Review and Patient/Caregiver Call       Warfarin doses taken: Warfarin taken as instructed    Diet: No new diet changes identified    New illness, injury, or hospitalization: No    Medication/supplement changes: None noted    Signs or symptoms of bleeding or clotting: No    Previous INR: Supratherapeutic    Additional findings: None         PLAN     Recommended plan for no diet, medication or health factor changes affecting INR     Dosing Instructions: Continue your current warfarin dose with next INR in 1 week       Summary  As of 2/28/2023    Full warfarin instructions:  5 mg every Mon, Thu; 2.5 mg all other days   Next INR check:  3/7/2023             Detailed voice message left for Buck with dosing instructions and follow up date.   Sent [employer] message with dosing and follow up instructions    Patient to recheck with home meter    Education provided:     Please call back if any changes to your diet, medications or how you've been taking warfarin    Resume manage by exception with home monitor. Continue to submit INR results to home monitor company.You will only be called when your result is out of range. Please call and notify [facility] if new medication started, dose missed, signs or symptoms of bleeding or clotting, or a surgery/procedure is scheduled.    Plan made per [facility] anticoagulation protocol    Leana Nayak, RN  Anticoagulation Clinic  2/28/2023    _______________________________________________________________________     Anticoagulation Episode Summary     Current INR goal:  2.0-3.0   TTR:  84.2 % (1 y)   Target end date:  Indefinite   Send INR reminders to:  [facility]    Indications    Persistent Atrial Fibrillation [I48.91]  Persistent atrial fibrillation (H)  [I48.19]           Comments:  Home monitor ( [employer] )managed by exception         Anticoagulation Care Providers     Provider Role Specialty Phone number    Erica Hansen APRN CNP Referring Cardiovascular Disease [phone]    Domo Garrett MD Referring Cardiovascular Disease [phone]    Everett Renee MD  Cardiovascular Disease [phone]

## 2023-03-03 LAB
MDC_IDC_EPISODE_DTM: NORMAL
MDC_IDC_EPISODE_DURATION: 100 S
MDC_IDC_EPISODE_DURATION: 107 S
MDC_IDC_EPISODE_DURATION: 108 S
MDC_IDC_EPISODE_DURATION: 111 S
MDC_IDC_EPISODE_DURATION: 112 S
MDC_IDC_EPISODE_DURATION: 112 S
MDC_IDC_EPISODE_DURATION: 114 S
MDC_IDC_EPISODE_DURATION: 119 S
MDC_IDC_EPISODE_DURATION: 128 S
MDC_IDC_EPISODE_DURATION: 132 S
MDC_IDC_EPISODE_DURATION: 136 S
MDC_IDC_EPISODE_DURATION: 136 S
MDC_IDC_EPISODE_DURATION: 138 S
MDC_IDC_EPISODE_DURATION: 139 S
MDC_IDC_EPISODE_DURATION: 142 S
MDC_IDC_EPISODE_DURATION: 142 S
MDC_IDC_EPISODE_DURATION: 145 S
MDC_IDC_EPISODE_DURATION: 146 S
MDC_IDC_EPISODE_DURATION: 148 S
MDC_IDC_EPISODE_DURATION: 15 S
MDC_IDC_EPISODE_DURATION: 152 S
MDC_IDC_EPISODE_DURATION: 159 S
MDC_IDC_EPISODE_DURATION: 165 S
MDC_IDC_EPISODE_DURATION: 166 S
MDC_IDC_EPISODE_DURATION: 167 S
MDC_IDC_EPISODE_DURATION: 17 S
MDC_IDC_EPISODE_DURATION: 172 S
MDC_IDC_EPISODE_DURATION: 175 S
MDC_IDC_EPISODE_DURATION: 177 S
MDC_IDC_EPISODE_DURATION: 178 S
MDC_IDC_EPISODE_DURATION: 18 S
MDC_IDC_EPISODE_DURATION: 181 S
MDC_IDC_EPISODE_DURATION: 182 S
MDC_IDC_EPISODE_DURATION: 182 S
MDC_IDC_EPISODE_DURATION: 183 S
MDC_IDC_EPISODE_DURATION: 183 S
MDC_IDC_EPISODE_DURATION: 184 S
MDC_IDC_EPISODE_DURATION: 185 S
MDC_IDC_EPISODE_DURATION: 188 S
MDC_IDC_EPISODE_DURATION: 19 S
MDC_IDC_EPISODE_DURATION: 19 S
MDC_IDC_EPISODE_DURATION: 191 S
MDC_IDC_EPISODE_DURATION: 194 S
MDC_IDC_EPISODE_DURATION: 195 S
MDC_IDC_EPISODE_DURATION: 198 S
MDC_IDC_EPISODE_DURATION: 20 S
MDC_IDC_EPISODE_DURATION: 20 S
MDC_IDC_EPISODE_DURATION: 201 S
MDC_IDC_EPISODE_DURATION: 205 S
MDC_IDC_EPISODE_DURATION: 209 S
MDC_IDC_EPISODE_DURATION: 22 S
MDC_IDC_EPISODE_DURATION: 221 S
MDC_IDC_EPISODE_DURATION: 230 S
MDC_IDC_EPISODE_DURATION: 235 S
MDC_IDC_EPISODE_DURATION: 241 S
MDC_IDC_EPISODE_DURATION: 243 S
MDC_IDC_EPISODE_DURATION: 244 S
MDC_IDC_EPISODE_DURATION: 248 S
MDC_IDC_EPISODE_DURATION: 254 S
MDC_IDC_EPISODE_DURATION: 269 S
MDC_IDC_EPISODE_DURATION: 27 S
MDC_IDC_EPISODE_DURATION: 271 S
MDC_IDC_EPISODE_DURATION: 273 S
MDC_IDC_EPISODE_DURATION: 281 S
MDC_IDC_EPISODE_DURATION: 297 S
MDC_IDC_EPISODE_DURATION: 305 S
MDC_IDC_EPISODE_DURATION: 318 S
MDC_IDC_EPISODE_DURATION: 32 S
MDC_IDC_EPISODE_DURATION: 338 S
MDC_IDC_EPISODE_DURATION: 37 S
MDC_IDC_EPISODE_DURATION: 37 S
MDC_IDC_EPISODE_DURATION: 370 S
MDC_IDC_EPISODE_DURATION: 381 S
MDC_IDC_EPISODE_DURATION: 381 S
MDC_IDC_EPISODE_DURATION: 391 S
MDC_IDC_EPISODE_DURATION: 43 S
MDC_IDC_EPISODE_DURATION: 48 S
MDC_IDC_EPISODE_DURATION: 484 S
MDC_IDC_EPISODE_DURATION: 49 S
MDC_IDC_EPISODE_DURATION: 5 S
MDC_IDC_EPISODE_DURATION: 5 S
MDC_IDC_EPISODE_DURATION: 53 S
MDC_IDC_EPISODE_DURATION: 55 S
MDC_IDC_EPISODE_DURATION: 56 S
MDC_IDC_EPISODE_DURATION: 56 S
MDC_IDC_EPISODE_DURATION: 578 S
MDC_IDC_EPISODE_DURATION: 6 S
MDC_IDC_EPISODE_DURATION: 62 S
MDC_IDC_EPISODE_DURATION: 63 S
MDC_IDC_EPISODE_DURATION: 65 S
MDC_IDC_EPISODE_DURATION: 65 S
MDC_IDC_EPISODE_DURATION: 69 S
MDC_IDC_EPISODE_DURATION: 69 S
MDC_IDC_EPISODE_DURATION: 7 S
MDC_IDC_EPISODE_DURATION: 73 S
MDC_IDC_EPISODE_DURATION: 8 S
MDC_IDC_EPISODE_DURATION: 8 S
MDC_IDC_EPISODE_DURATION: 82 S
MDC_IDC_EPISODE_DURATION: 85 S
MDC_IDC_EPISODE_DURATION: 86 S
MDC_IDC_EPISODE_DURATION: 87 S
MDC_IDC_EPISODE_DURATION: 87 S
MDC_IDC_EPISODE_DURATION: 88 S
MDC_IDC_EPISODE_DURATION: 91 S
MDC_IDC_EPISODE_DURATION: 92 S
MDC_IDC_EPISODE_DURATION: 92 S
MDC_IDC_EPISODE_DURATION: 98 S
MDC_IDC_EPISODE_ID: 227
MDC_IDC_EPISODE_ID: 228
MDC_IDC_EPISODE_ID: 229
MDC_IDC_EPISODE_ID: 230
MDC_IDC_EPISODE_ID: 234
MDC_IDC_EPISODE_ID: 235
MDC_IDC_EPISODE_ID: 236
MDC_IDC_EPISODE_ID: 239
MDC_IDC_EPISODE_ID: 241
MDC_IDC_EPISODE_ID: 243
MDC_IDC_EPISODE_ID: 245
MDC_IDC_EPISODE_ID: 246
MDC_IDC_EPISODE_ID: 247
MDC_IDC_EPISODE_ID: 249
MDC_IDC_EPISODE_ID: 250
MDC_IDC_EPISODE_ID: 251
MDC_IDC_EPISODE_ID: 254
MDC_IDC_EPISODE_ID: 255
MDC_IDC_EPISODE_ID: 256
MDC_IDC_EPISODE_ID: 258
MDC_IDC_EPISODE_ID: 260
MDC_IDC_EPISODE_ID: 262
MDC_IDC_EPISODE_ID: 263
MDC_IDC_EPISODE_ID: 264
MDC_IDC_EPISODE_ID: 265
MDC_IDC_EPISODE_ID: 267
MDC_IDC_EPISODE_ID: 268
MDC_IDC_EPISODE_ID: 269
MDC_IDC_EPISODE_ID: 270
MDC_IDC_EPISODE_ID: 271
MDC_IDC_EPISODE_ID: 273
MDC_IDC_EPISODE_ID: 274
MDC_IDC_EPISODE_ID: 280
MDC_IDC_EPISODE_ID: 283
MDC_IDC_EPISODE_ID: 284
MDC_IDC_EPISODE_ID: 287
MDC_IDC_EPISODE_ID: 290
MDC_IDC_EPISODE_ID: 291
MDC_IDC_EPISODE_ID: 292
MDC_IDC_EPISODE_ID: 293
MDC_IDC_EPISODE_ID: 298
MDC_IDC_EPISODE_ID: 299
MDC_IDC_EPISODE_ID: 300
MDC_IDC_EPISODE_ID: 301
MDC_IDC_EPISODE_ID: 302
MDC_IDC_EPISODE_ID: 303
MDC_IDC_EPISODE_ID: 304
MDC_IDC_EPISODE_ID: 305
MDC_IDC_EPISODE_ID: 306
MDC_IDC_EPISODE_ID: 307
MDC_IDC_EPISODE_ID: 308
MDC_IDC_EPISODE_ID: 309
MDC_IDC_EPISODE_ID: 310
MDC_IDC_EPISODE_ID: 311
MDC_IDC_EPISODE_ID: 312
MDC_IDC_EPISODE_ID: 313
MDC_IDC_EPISODE_ID: 314
MDC_IDC_EPISODE_ID: 315
MDC_IDC_EPISODE_ID: 316
MDC_IDC_EPISODE_ID: 317
MDC_IDC_EPISODE_ID: 318
MDC_IDC_EPISODE_ID: 319
MDC_IDC_EPISODE_ID: 320
MDC_IDC_EPISODE_ID: 321
MDC_IDC_EPISODE_ID: 322
MDC_IDC_EPISODE_ID: 323
MDC_IDC_EPISODE_ID: 324
MDC_IDC_EPISODE_ID: 325
MDC_IDC_EPISODE_ID: 326
MDC_IDC_EPISODE_ID: 327
MDC_IDC_EPISODE_ID: 328
MDC_IDC_EPISODE_ID: 329
MDC_IDC_EPISODE_ID: 330
MDC_IDC_EPISODE_ID: 331
MDC_IDC_EPISODE_ID: 332
MDC_IDC_EPISODE_ID: 333
MDC_IDC_EPISODE_ID: 334
MDC_IDC_EPISODE_ID: 335
MDC_IDC_EPISODE_ID: 336
MDC_IDC_EPISODE_ID: 337
MDC_IDC_EPISODE_ID: 338
MDC_IDC_EPISODE_ID: 339
MDC_IDC_EPISODE_ID: 340
MDC_IDC_EPISODE_ID: 341
MDC_IDC_EPISODE_ID: 342
MDC_IDC_EPISODE_ID: 343
MDC_IDC_EPISODE_ID: 344
MDC_IDC_EPISODE_ID: 345
MDC_IDC_EPISODE_ID: 346
MDC_IDC_EPISODE_ID: 347
MDC_IDC_EPISODE_ID: 348
MDC_IDC_EPISODE_ID: 349
MDC_IDC_EPISODE_ID: 350
MDC_IDC_EPISODE_ID: 351
MDC_IDC_EPISODE_ID: 352
MDC_IDC_EPISODE_ID: 353
MDC_IDC_EPISODE_ID: 354
MDC_IDC_EPISODE_ID: 355
MDC_IDC_EPISODE_ID: 356
MDC_IDC_EPISODE_ID: 357
MDC_IDC_EPISODE_ID: 358
MDC_IDC_EPISODE_ID: 359
MDC_IDC_EPISODE_ID: 360
MDC_IDC_EPISODE_ID: 361
MDC_IDC_EPISODE_ID: 362
MDC_IDC_EPISODE_ID: 363
MDC_IDC_EPISODE_ID: 364
MDC_IDC_EPISODE_ID: 365
MDC_IDC_EPISODE_ID: 366
MDC_IDC_EPISODE_ID: 367
MDC_IDC_EPISODE_ID: 368
MDC_IDC_EPISODE_ID: 369
MDC_IDC_EPISODE_ID: 370
MDC_IDC_EPISODE_ID: 371
MDC_IDC_EPISODE_ID: 372
MDC_IDC_EPISODE_THERAPY_RESULT: NORMAL
MDC_IDC_EPISODE_TYPE: NORMAL
MDC_IDC_LEAD_IMPLANT_DT: NORMAL
MDC_IDC_LEAD_IMPLANT_DT: NORMAL
MDC_IDC_LEAD_LOCATION: NORMAL
MDC_IDC_LEAD_LOCATION: NORMAL
MDC_IDC_LEAD_LOCATION_DETAIL_1: NORMAL
MDC_IDC_LEAD_LOCATION_DETAIL_1: NORMAL
MDC_IDC_LEAD_MFG: NORMAL
MDC_IDC_LEAD_MFG: NORMAL
MDC_IDC_LEAD_MODEL: NORMAL
MDC_IDC_LEAD_MODEL: NORMAL
MDC_IDC_LEAD_POLARITY_TYPE: NORMAL
MDC_IDC_LEAD_POLARITY_TYPE: NORMAL
MDC_IDC_LEAD_SERIAL: NORMAL
MDC_IDC_LEAD_SERIAL: NORMAL
MDC_IDC_MSMT_BATTERY_DTM: NORMAL
MDC_IDC_MSMT_BATTERY_REMAINING_LONGEVITY: 124 MO
MDC_IDC_MSMT_BATTERY_RRT_TRIGGER: NORMAL
MDC_IDC_MSMT_BATTERY_VOLTAGE: 3.04 V
MDC_IDC_MSMT_CAP_CHARGE_DTM: NORMAL
MDC_IDC_MSMT_CAP_CHARGE_ENERGY: 18 J
MDC_IDC_MSMT_CAP_CHARGE_TIME: 3.7 S
MDC_IDC_MSMT_CAP_CHARGE_TYPE: NORMAL
MDC_IDC_MSMT_LEADCHNL_RA_IMPEDANCE_VALUE: 342 OHM
MDC_IDC_MSMT_LEADCHNL_RA_SENSING_INTR_AMPL: 0.1 MV
MDC_IDC_MSMT_LEADCHNL_RV_IMPEDANCE_VALUE: 266 OHM
MDC_IDC_MSMT_LEADCHNL_RV_IMPEDANCE_VALUE: 323 OHM
MDC_IDC_MSMT_LEADCHNL_RV_PACING_THRESHOLD_AMPLITUDE: 0.62 V
MDC_IDC_MSMT_LEADCHNL_RV_PACING_THRESHOLD_PULSEWIDTH: 0.4 MS
MDC_IDC_MSMT_LEADCHNL_RV_SENSING_INTR_AMPL: 9.8 MV
MDC_IDC_PG_IMPLANT_DTM: NORMAL
MDC_IDC_PG_MFG: NORMAL
MDC_IDC_PG_MODEL: NORMAL
MDC_IDC_PG_SERIAL: NORMAL
MDC_IDC_PG_TYPE: NORMAL
MDC_IDC_SESS_CLINIC_NAME: NORMAL
MDC_IDC_SESS_DTM: NORMAL
MDC_IDC_SESS_TYPE: NORMAL
MDC_IDC_SET_BRADY_AT_MODE_SWITCH_RATE: 171 {BEATS}/MIN
MDC_IDC_SET_BRADY_LOWRATE: 60 {BEATS}/MIN
MDC_IDC_SET_BRADY_MAX_SENSOR_RATE: 120 {BEATS}/MIN
MDC_IDC_SET_BRADY_MAX_TRACKING_RATE: 130 {BEATS}/MIN
MDC_IDC_SET_BRADY_MODE: NORMAL
MDC_IDC_SET_BRADY_PAV_DELAY_LOW: 180 MS
MDC_IDC_SET_BRADY_SAV_DELAY_LOW: 150 MS
MDC_IDC_SET_LEADCHNL_RA_PACING_AMPLITUDE: 2 V
MDC_IDC_SET_LEADCHNL_RA_PACING_ANODE_ELECTRODE_1: NORMAL
MDC_IDC_SET_LEADCHNL_RA_PACING_ANODE_LOCATION_1: NORMAL
MDC_IDC_SET_LEADCHNL_RA_PACING_CAPTURE_MODE: NORMAL
MDC_IDC_SET_LEADCHNL_RA_PACING_CATHODE_ELECTRODE_1: NORMAL
MDC_IDC_SET_LEADCHNL_RA_PACING_CATHODE_LOCATION_1: NORMAL
MDC_IDC_SET_LEADCHNL_RA_PACING_POLARITY: NORMAL
MDC_IDC_SET_LEADCHNL_RA_PACING_PULSEWIDTH: 0.4 MS
MDC_IDC_SET_LEADCHNL_RA_SENSING_ANODE_ELECTRODE_1: NORMAL
MDC_IDC_SET_LEADCHNL_RA_SENSING_ANODE_LOCATION_1: NORMAL
MDC_IDC_SET_LEADCHNL_RA_SENSING_CATHODE_ELECTRODE_1: NORMAL
MDC_IDC_SET_LEADCHNL_RA_SENSING_CATHODE_LOCATION_1: NORMAL
MDC_IDC_SET_LEADCHNL_RA_SENSING_POLARITY: NORMAL
MDC_IDC_SET_LEADCHNL_RA_SENSING_SENSITIVITY: 0.15 MV
MDC_IDC_SET_LEADCHNL_RV_PACING_AMPLITUDE: 1.5 V
MDC_IDC_SET_LEADCHNL_RV_PACING_ANODE_ELECTRODE_1: NORMAL
MDC_IDC_SET_LEADCHNL_RV_PACING_ANODE_LOCATION_1: NORMAL
MDC_IDC_SET_LEADCHNL_RV_PACING_CAPTURE_MODE: NORMAL
MDC_IDC_SET_LEADCHNL_RV_PACING_CATHODE_ELECTRODE_1: NORMAL
MDC_IDC_SET_LEADCHNL_RV_PACING_CATHODE_LOCATION_1: NORMAL
MDC_IDC_SET_LEADCHNL_RV_PACING_POLARITY: NORMAL
MDC_IDC_SET_LEADCHNL_RV_PACING_PULSEWIDTH: 0.4 MS
MDC_IDC_SET_LEADCHNL_RV_SENSING_ANODE_ELECTRODE_1: NORMAL
MDC_IDC_SET_LEADCHNL_RV_SENSING_ANODE_LOCATION_1: NORMAL
MDC_IDC_SET_LEADCHNL_RV_SENSING_CATHODE_ELECTRODE_1: NORMAL
MDC_IDC_SET_LEADCHNL_RV_SENSING_CATHODE_LOCATION_1: NORMAL
MDC_IDC_SET_LEADCHNL_RV_SENSING_POLARITY: NORMAL
MDC_IDC_SET_LEADCHNL_RV_SENSING_SENSITIVITY: 0.45 MV
MDC_IDC_SET_ZONE_DETECTION_BEATS_DENOMINATOR: 40 {BEATS}
MDC_IDC_SET_ZONE_DETECTION_BEATS_NUMERATOR: 30 {BEATS}
MDC_IDC_SET_ZONE_DETECTION_INTERVAL: 200 MS
MDC_IDC_SET_ZONE_DETECTION_INTERVAL: 240 MS
MDC_IDC_SET_ZONE_DETECTION_INTERVAL: 320 MS
MDC_IDC_SET_ZONE_DETECTION_INTERVAL: 350 MS
MDC_IDC_SET_ZONE_DETECTION_INTERVAL: 360 MS
MDC_IDC_SET_ZONE_DETECTION_INTERVAL: 360 MS
MDC_IDC_SET_ZONE_TYPE: NORMAL
MDC_IDC_STAT_AT_BURDEN_PERCENT: 0.81 %
MDC_IDC_STAT_AT_DTM_END: NORMAL
MDC_IDC_STAT_AT_DTM_START: NORMAL
MDC_IDC_STAT_BRADY_DTM_END: NORMAL
MDC_IDC_STAT_BRADY_DTM_START: NORMAL
MDC_IDC_STAT_BRADY_RV_PERCENT_PACED: 9.49 %
MDC_IDC_STAT_CRT_DTM_END: NORMAL
MDC_IDC_STAT_CRT_DTM_START: NORMAL
MDC_IDC_STAT_EPISODE_RECENT_COUNT: 0
MDC_IDC_STAT_EPISODE_RECENT_COUNT: 65
MDC_IDC_STAT_EPISODE_RECENT_COUNT: 81
MDC_IDC_STAT_EPISODE_RECENT_COUNT_DTM_END: NORMAL
MDC_IDC_STAT_EPISODE_RECENT_COUNT_DTM_START: NORMAL
MDC_IDC_STAT_EPISODE_TOTAL_COUNT: 0
MDC_IDC_STAT_EPISODE_TOTAL_COUNT: 181
MDC_IDC_STAT_EPISODE_TOTAL_COUNT: 191
MDC_IDC_STAT_EPISODE_TOTAL_COUNT_DTM_END: NORMAL
MDC_IDC_STAT_EPISODE_TOTAL_COUNT_DTM_START: NORMAL
MDC_IDC_STAT_EPISODE_TYPE: NORMAL
MDC_IDC_STAT_TACHYTHERAPY_ATP_DELIVERED_RECENT: 0
MDC_IDC_STAT_TACHYTHERAPY_ATP_DELIVERED_TOTAL: 0
MDC_IDC_STAT_TACHYTHERAPY_RECENT_DTM_END: NORMAL
MDC_IDC_STAT_TACHYTHERAPY_RECENT_DTM_START: NORMAL
MDC_IDC_STAT_TACHYTHERAPY_SHOCKS_ABORTED_RECENT: 0
MDC_IDC_STAT_TACHYTHERAPY_SHOCKS_ABORTED_TOTAL: 0
MDC_IDC_STAT_TACHYTHERAPY_TOTAL_DTM_END: NORMAL
MDC_IDC_STAT_TACHYTHERAPY_TOTAL_DTM_START: NORMAL

## 2023-03-03 PROCEDURE — 93295 DEV INTERROG REMOTE 1/2/MLT: CPT | Performed by: INTERNAL MEDICINE

## 2023-03-03 PROCEDURE — 93296 REM INTERROG EVL PM/IDS: CPT | Performed by: INTERNAL MEDICINE

## 2023-03-07 ENCOUNTER — DOCUMENTATION ONLY (OUTPATIENT)
Dept: ANTICOAGULATION | Facility: CLINIC | Age: 78
End: 2023-03-07
Payer: COMMERCIAL

## 2023-03-07 DIAGNOSIS — I48.19 PERSISTENT ATRIAL FIBRILLATION (H): Primary | ICD-10-CM

## 2023-03-07 LAB — INR HOME MONITORING: 3 (ref 2–3)

## 2023-03-07 NOTE — PROGRESS NOTES
ANTICOAGULATION  MANAGEMENT-Home Monitor Managed by Exception    Buck Sinclair 78 year old male is on warfarin with therapeutic INR result. (Goal INR 2.0-3.0)    Recent labs: (last 7 days)     03/07/23  0000   INR 3.0         Previous INR was Therapeutic    Medication, diet, health changes since last INR:chart reviewed; none identified    Contacted within the last 12 weeks by phone on 2/28/23      LUCAS Jane was NOT contacted regarding therapeutic result today per home monitoring policy manage by exception agreement.   Current warfarin dose is to be continued:     Summary  As of 3/7/2023    Full warfarin instructions:  5 mg every Mon, Thu; 2.5 mg all other days   Next INR check:  3/14/2023           ?   Leana Nayak RN  Anticoagulation Clinic  3/7/2023    _______________________________________________________________________     Anticoagulation Episode Summary     Current INR goal:  2.0-3.0   TTR:  84.2 % (1 y)   Target end date:  Indefinite   Send INR reminders to:  [facility]    Indications    Persistent Atrial Fibrillation [I48.91]  Persistent atrial fibrillation (H) [I48.19]           Comments:  Home monitor ( [employer] )managed by exception         Anticoagulation Care Providers     Provider Role Specialty Phone number    Erica Hansen APRN CNP Referring Cardiovascular Disease [phone]    Domo Garrett MD Referring Cardiovascular Disease [phone]    Everett Renee MD  Cardiovascular Disease [phone]

## 2023-03-14 ENCOUNTER — DOCUMENTATION ONLY (OUTPATIENT)
Dept: ANTICOAGULATION | Facility: CLINIC | Age: 78
End: 2023-03-14
Payer: COMMERCIAL

## 2023-03-14 DIAGNOSIS — I48.91 ATRIAL FIBRILLATION (H): Primary | Chronic | ICD-10-CM

## 2023-03-14 DIAGNOSIS — I48.19 PERSISTENT ATRIAL FIBRILLATION (H): ICD-10-CM

## 2023-03-14 LAB — INR HOME MONITORING: 2.8 (ref 2–3)

## 2023-03-14 NOTE — PROGRESS NOTES
ANTICOAGULATION  MANAGEMENT-Home Monitor Managed by Exception    Buck Sinclair 78 year old male is on warfarin with therapeutic INR result. (Goal INR 2.0-3.0)    Recent labs: (last 7 days)     03/14/23  0000   INR 2.8         Previous INR was Therapeutic    Medication, diet, health changes since last INR:chart reviewed; none identified    Contacted within the last 12 weeks by phone on 02/28/2023      LUCAS Jane was NOT contacted regarding therapeutic result today per home monitoring policy manage by exception agreement.   Current warfarin dose is to be continued:     Summary  As of 3/14/2023    Full warfarin instructions:  5 mg every Mon, Thu; 2.5 mg all other days   Next INR check:  3/21/2023           ?   Clarisa Ortiz RN  Anticoagulation Clinic  3/14/2023    _______________________________________________________________________     Anticoagulation Episode Summary     Current INR goal:  2.0-3.0   TTR:  84.2 % (1 y)   Target end date:  Indefinite   Send INR reminders to:  [facility]    Indications    Persistent Atrial Fibrillation [I48.91]  Persistent atrial fibrillation (H) [I48.19]           Comments:  Home monitor ( [employer] )managed by exception         Anticoagulation Care Providers     Provider Role Specialty Phone number    Erica Hansen APRN CNP Referring Cardiovascular Disease [phone]    Domo Garrett MD Referring Cardiovascular Disease [phone]    Everett Renee MD  Cardiovascular Disease [phone]

## 2023-03-21 ENCOUNTER — DOCUMENTATION ONLY (OUTPATIENT)
Dept: ANTICOAGULATION | Facility: CLINIC | Age: 78
End: 2023-03-21
Payer: COMMERCIAL

## 2023-03-21 DIAGNOSIS — I48.19 PERSISTENT ATRIAL FIBRILLATION (H): Primary | ICD-10-CM

## 2023-03-21 LAB — INR HOME MONITORING: 2.7 (ref 2–3)

## 2023-03-21 NOTE — PROGRESS NOTES
ANTICOAGULATION  MANAGEMENT-Home Monitor Managed by Exception    Buck Sinclair 78 year old male is on warfarin with therapeutic INR result. (Goal INR 2.0-3.0)    Recent labs: (last 7 days)     03/21/23  0000   INR 2.7         Previous INR was Therapeutic    Medication, diet, health changes since last INR:chart reviewed; none identified    Contacted within the last 12 weeks by phone on 2/28/23      LUCAS Jane was NOT contacted regarding therapeutic result today per home monitoring policy manage by exception agreement.   Current warfarin dose is to be continued:     Summary  As of 3/21/2023    Full warfarin instructions:  5 mg every Mon, Thu; 2.5 mg all other days   Next INR check:  3/28/2023           ?   Leana Nayak RN  Anticoagulation Clinic  3/21/2023    _______________________________________________________________________     Anticoagulation Episode Summary     Current INR goal:  2.0-3.0   TTR:  84.2 % (1 y)   Target end date:  Indefinite   Send INR reminders to:  [facility]    Indications    Persistent Atrial Fibrillation [I48.91]  Persistent atrial fibrillation (H) [I48.19]           Comments:  Home monitor ( [employer] )managed by exception         Anticoagulation Care Providers     Provider Role Specialty Phone number    Erica Hansen APRN CNP Referring Cardiovascular Disease [phone]    Domo Garrett MD Referring Cardiovascular Disease [phone]    Everett Renee MD  Cardiovascular Disease [phone]

## 2023-03-28 NOTE — PROGRESS NOTES
ANTICOAGULATION  MANAGEMENT-Home Monitor Managed by Exception    Buck Sinclair 78 year old male is on warfarin with therapeutic INR result. (Goal INR 2.0-3.0)    Recent labs: (last 7 days)     03/28/23  0000   INR 2.9         Previous INR was Therapeutic    Medication, diet, health changes since last INR:chart reviewed; none identified    Contacted within the last 12 weeks by phone on 2/28/23      LUCAS Jane was NOT contacted regarding therapeutic result today per home monitoring policy manage by exception agreement.   Current warfarin dose is to be continued:     Summary  As of 3/28/2023    Full warfarin instructions:  5 mg every Mon, Thu; 2.5 mg all other days   Next INR check:  4/4/2023           ?   Leana Nayak RN  Anticoagulation Clinic  3/28/2023    _______________________________________________________________________     Anticoagulation Episode Summary     Current INR goal:  2.0-3.0   TTR:  84.2 % (1 y)   Target end date:  Indefinite   Send INR reminders to:  [facility]    Indications    Persistent Atrial Fibrillation [I48.91]  Persistent atrial fibrillation (H) [I48.19]           Comments:  Home monitor ( [employer] )managed by exception         Anticoagulation Care Providers     Provider Role Specialty Phone number    Erica Hansen APRN CNP Referring Cardiovascular Disease [phone]    Domo Garrett MD Referring Cardiovascular Disease [phone]    Everett Renee MD  Cardiovascular Disease [phone]

## 2023-04-04 NOTE — PROGRESS NOTES
ANTICOAGULATION  MANAGEMENT-Home Monitor Managed by Exception    Buck Sinclair 78 year old male is on warfarin with therapeutic INR result. (Goal INR 2.0-3.0)    Recent labs: (last 7 days)     04/04/23  0000   INR 2.9         Previous INR was Therapeutic    Medication, diet, health changes since last INR:chart reviewed; none identified    Contacted within the last 12 weeks by phone on 2/28/23      LUCAS     Buck was NOT contacted regarding therapeutic result today per home monitoring policy manage by exception agreement.   Current warfarin dose is to be continued:     Summary  As of 4/4/2023    Full warfarin instructions:  5 mg every Mon, Thu; 2.5 mg all other days   Next INR check:  4/11/2023           ?   Marina Velazco RN  Anticoagulation Clinic  4/4/2023    _______________________________________________________________________     Anticoagulation Episode Summary     Current INR goal:  2.0-3.0   TTR:  84.2 % (1 y)   Target end date:  Indefinite   Send INR reminders to:  [facility]    Indications    Persistent Atrial Fibrillation [I48.91]  Persistent atrial fibrillation (H) [I48.19]           Comments:  Home monitor ( [employer] )managed by exception         Anticoagulation Care Providers     Provider Role Specialty Phone number    Erica Hansen APRN CNP Referring Cardiovascular Disease [phone]    Domo Garrett MD Referring Cardiovascular Disease [phone]    Everett Renee MD  Cardiovascular Disease [phone]

## 2023-04-11 NOTE — PROGRESS NOTES
ANTICOAGULATION  MANAGEMENT-Home Monitor Managed by Exception    Buck Sinclair 78 year old male is on warfarin with therapeutic INR result. (Goal INR 2.0-3.0)    Recent labs: (last 7 days)     04/11/23  0000   INR 2.9         Previous INR was Therapeutic    Medication, diet, health changes since last INR:chart reviewed; none identified    Contacted within the last 12 weeks by phone on 2/28      LUCAS Jane was NOT contacted regarding therapeutic result today per home monitoring policy manage by exception agreement.   Current warfarin dose is to be continued:     Summary  As of 4/11/2023    Full warfarin instructions:  5 mg every Mon, Thu; 2.5 mg all other days   Next INR check:  4/18/2023           ?   Fay Lewis, RN  Anticoagulation Clinic  4/11/2023    _______________________________________________________________________     Anticoagulation Episode Summary     Current INR goal:  2.0-3.0   TTR:  85.8 % (1 y)   Target end date:  Indefinite   Send INR reminders to:  [facility]    Indications    Persistent Atrial Fibrillation [I48.91]  Persistent atrial fibrillation (H) [I48.19]           Comments:  Home monitor ( [employer] )managed by exception         Anticoagulation Care Providers     Provider Role Specialty Phone number    Erica Hansen APRN CNP Referring Cardiovascular Disease [phone]    Domo Garrett MD Referring Cardiovascular Disease [phone]    Everett Renee MD  Cardiovascular Disease [phone]

## 2023-04-18 NOTE — PROGRESS NOTES
ANTICOAGULATION  MANAGEMENT-Home Monitor Managed by Exception    Buck Sinclair 78 year old male is on warfarin with therapeutic INR result. (Goal INR 2.0-3.0)    Recent labs: (last 7 days)     04/18/23  0000   INR 2.4         Previous INR was Therapeutic    Medication, diet, health changes since last INR:chart reviewed; none identified    Contacted within the last 12 weeks by phone on 2/28/23      LUCAS Jane was NOT contacted regarding therapeutic result today per home monitoring policy manage by exception agreement.   Current warfarin dose is to be continued:     Summary  As of 4/18/2023    Full warfarin instructions:  5 mg every Mon, Thu; 2.5 mg all other days   Next INR check:  4/25/2023           ?   Leana Nayak RN  Anticoagulation Clinic  4/18/2023    _______________________________________________________________________     Anticoagulation Episode Summary     Current INR goal:  2.0-3.0   TTR:  87.0 % (1 y)   Target end date:  Indefinite   Send INR reminders to:  [facility]    Indications    Persistent Atrial Fibrillation [I48.91]  Persistent atrial fibrillation (H) [I48.19]           Comments:  Home monitor ( [employer] )managed by exception         Anticoagulation Care Providers     Provider Role Specialty Phone number    Erica Hansen APRN CNP Referring Cardiovascular Disease [phone]    Domo Garrett MD Referring Cardiovascular Disease [phone]    Everett Renee MD  Cardiovascular Disease [phone]

## 2023-04-25 NOTE — PROGRESS NOTES
ANTICOAGULATION  MANAGEMENT-Home Monitor Managed by Exception    Buckbradley Sinclair 78 year old male is on warfarin with therapeutic INR result. (Goal INR 2.0-3.0)    Recent labs: (last 7 days)     04/25/23  0000   INR 3.0         Previous INR was Therapeutic    Medication, diet, health changes since last INR:chart reviewed; none identified    Contacted within the last 12 weeks by phone on 2/2/23 (~12 weeks 5/23/23)      LUCAS Jane was NOT contacted regarding therapeutic result today per home monitoring policy manage by exception agreement.   Current warfarin dose is to be continued:     Summary  As of 4/25/2023    Full warfarin instructions:  5 mg every Mon, Thu; 2.5 mg all other days   Next INR check:  5/9/2023           ?   Lottie Sotelo RN  Anticoagulation Clinic  4/25/2023    _______________________________________________________________________     Anticoagulation Episode Summary     Current INR goal:  2.0-3.0   TTR:  87.0 % (1 y)   Target end date:  Indefinite   Send INR reminders to:  [facility]    Indications    Persistent Atrial Fibrillation [I48.91]  Persistent atrial fibrillation (H) [I48.19]           Comments:  Home monitor ( [employer] )managed by exception         Anticoagulation Care Providers     Provider Role Specialty Phone number    Erica Hansen APRN CNP Referring Cardiovascular Disease [phone]    Domo Garrett MD Referring Cardiovascular Disease [phone]    Everett Renee MD  Cardiovascular Disease [phone]

## 2023-05-02 NOTE — PROGRESS NOTES
ANTICOAGULATION MANAGEMENT     Buck Sinclair 78 year old male is on warfarin with supratherapeutic INR result. (Goal INR 2.0-3.0)    Recent labs: (last 7 days)     05/02/23  0000   INR 3.2*       ASSESSMENT       Source(s): Chart Review and Patient/Caregiver Call - wifeNeela       Warfarin doses taken: Warfarin taken as instructed    Diet: No new diet changes identified    Medication/supplement changes: None noted    New illness, injury, or hospitalization: No    Signs or symptoms of bleeding or clotting: No    Previous result: Therapeutic last 2(+) visits    Additional findings: None         PLAN     Recommended plan for no diet, medication or health factor changes affecting INR     Dosing Instructions: Continue your current warfarin dose with next INR in 2 weeks       Summary  As of 5/2/2023    Full warfarin instructions:  5 mg every Mon, Thu; 2.5 mg all other days   Next INR check:  5/16/2023             Telephone call with  Neela  who verbalizes understanding and agrees to plan    Patient to recheck with home meter    Education provided:     Please call back if any changes to your diet, medications or how you've been taking warfarin    Plan made per [facility] anticoagulation protocol    Lottie Sotelo, RN  Anticoagulation Clinic  5/2/2023    _______________________________________________________________________     Anticoagulation Episode Summary     Current INR goal:  2.0-3.0   TTR:  85.1 % (1 y)   Target end date:  Indefinite   Send INR reminders to:  [facility]    Indications    Persistent Atrial Fibrillation [I48.91]  Persistent atrial fibrillation (H) [I48.19]           Comments:  Home monitor ( [employer] )managed by exception         Anticoagulation Care Providers     Provider Role Specialty Phone number    Erica Hansen APRN CNP Referring Cardiovascular Disease [phone]    Domo Garrett MD Referring Cardiovascular Disease [phone]    Everett Renee MD  Cardiovascular  [facility] [phone]

## 2023-05-02 NOTE — TELEPHONE ENCOUNTER
General Call    Contacts       Type Contact Phone/Fax    05/02/2023 10:35 AM CDT Phone (Incoming) FELIPE BROWN (Emergency Contact) [phone]        Reason for Call:  Call Back    What are your questions or concerns:  Calling INR Nurse back. Phone number left has not yet been assigned. Please reach back out to patient.     Date of last appointment with provider: n/a    Could we send this information to you in [employer] or would you prefer to receive a phone call?:   Patient would prefer a phone call   Okay to leave a detailed message?: Yes at Home number on file [phone] (home)

## 2023-05-02 NOTE — PROGRESS NOTES
ANTICOAGULATION MANAGEMENT     Buck Sinclair 78 year old male is on warfarin with supratherapeutic INR result. (Goal INR 2.0-3.0)    Recent labs: (last 7 days)     05/02/23  0000   INR 3.2*       ASSESSMENT       Source(s): Chart Review    Previous INR was Therapeutic last 2(+) visits    Medication, diet, health changes since last INR chart reviewed; none identified             PLAN     Unable to reach pt today.    VM left to call [facility] back. Will try again later.     Follow up required to confirm warfarin dose taken and assess for changes    Lottie Sotelo, RN  Anticoagulation Clinic  5/2/2023

## 2023-05-09 NOTE — PROGRESS NOTES
ANTICOAGULATION MANAGEMENT     Buck Sinclair 78 year old male is on warfarin with therapeutic INR result. (Goal INR 2.0-3.0)    Recent labs: (last 7 days)     05/09/23  0000   INR 2.9       ASSESSMENT       Source(s): Chart Review and Patient/Caregiver Call       Warfarin doses taken: Warfarin taken as instructed    Diet: No new diet changes identified    Medication/supplement changes: None noted    New illness, injury, or hospitalization: No    Signs or symptoms of bleeding or clotting: No    Previous result: Supratherapeutic    Additional findings: None         PLAN     Recommended plan for no diet, medication or health factor changes affecting INR     Dosing Instructions: Continue your current warfarin dose with next INR in 1 week       Summary  As of 5/9/2023    Full warfarin instructions:  5 mg every Mon, Thu; 2.5 mg all other days   Next INR check:  5/16/2023             Telephone call with Buck who agrees to plan and repeated back plan correctly  Sent COM [employer] message with dosing and follow up instructions    Patient to recheck with home meter    Education provided:     Please call back if any changes to your diet, medications or how you've been taking warfarin    Resume manage by exception with home monitor. Continue to submit INR results to home monitor company.You will only be called when your result is out of range. Please call and notify [facility] if new medication started, dose missed, signs or symptoms of bleeding or clotting, or a surgery/procedure is scheduled.    Plan made per [facility] anticoagulation protocol    Leana Nayak, RN  Anticoagulation Clinic  5/9/2023    _______________________________________________________________________     Anticoagulation Episode Summary     Current INR goal:  2.0-3.0   TTR:  83.9 % (1 y)   Target end date:  Indefinite   Send INR reminders to:  [facility]    Indications    Persistent Atrial Fibrillation [I48.91]  Persistent atrial fibrillation (H)  [I48.19]           Comments:  Home monitor ( [employer] )managed by exception         Anticoagulation Care Providers     Provider Role Specialty Phone number    Erica Hansen APRN CNP Referring Cardiovascular Disease [phone]    Domo Garrett MD Referring Cardiovascular Disease [phone]    Everett Renee MD  Cardiovascular Disease [phone]

## 2023-05-16 NOTE — PROGRESS NOTES
ANTICOAGULATION MANAGEMENT     Buck Sinclair 78 year old male is on warfarin with supratherapeutic INR result. (Goal INR 2.0-3.0)    Recent labs: (last 7 days)     05/16/23  0000   INR 3.4*       ASSESSMENT       Source(s): Chart Review and Patient/Caregiver Call - Neela, wife       Warfarin doses taken: Warfarin taken as instructed    Diet: No new diet changes identified    Medication/supplement changes: None noted    New illness, injury, or hospitalization: No    Signs or symptoms of bleeding or clotting: No    Previous result: Therapeutic last visit; previously outside of goal range    Additional findings: None         PLAN     Recommended plan for no diet, medication or health factor changes affecting INR     Dosing Instructions: decrease your warfarin dose (11.1% change) with next INR in 1 week   - Neela said she would have pt have extra greens/Vit K tonight. She would like to decrease dose since he has been on the higher end/too high lately     Summary  As of 5/16/2023    Full warfarin instructions:  5 mg every Mon; 2.5 mg all other days   Next INR check:  5/23/2023             Telephone call with  Neela  who verbalizes understanding and agrees to plan    Patient to recheck with home meter    Education provided:     Please call back if any changes to your diet, medications or how you've been taking warfarin    Goal range and lab monitoring: goal range and significance of current result, Importance of therapeutic range and Importance of following up at instructed interval    Symptom monitoring: monitoring for bleeding signs and symptoms    Plan made per [facility] anticoagulation protocol    Lottie Sotelo, RN  Anticoagulation Clinic  5/16/2023    _______________________________________________________________________     Anticoagulation Episode Summary     Current INR goal:  2.0-3.0   TTR:  82.3 % (1 y)   Target end date:  Indefinite   Send INR reminders to:  [facility]    Indications     Persistent Atrial Fibrillation [I48.91]  Persistent atrial fibrillation (H) [I48.19]           Comments:  Home monitor ( [employer] )managed by exception         Anticoagulation Care Providers     Provider Role Specialty Phone number    Erica Hansen APRN CNP Referring Cardiovascular Disease [phone]    Domo Garrett MD Referring Cardiovascular Disease [phone]    Everett Renee MD  Cardiovascular Disease [phone]

## 2023-05-18 PROBLEM — I50.9 ACUTE ON CHRONIC CONGESTIVE HEART FAILURE, UNSPECIFIED HEART FAILURE TYPE (H): Status: ACTIVE | Noted: 2023-01-01

## 2023-05-18 NOTE — CONSULTS
"HEART CARE NOTE        Thank you, Dr. Cook, for asking the [facility] Heart Care team to see Buck Sinclair to evaluate ADHF.      Assessment/Recommendations     1. RV dysfunction/HFimpEF  Assessment / Plan  Profound fluid overload and resultant respiratory compromise requiring BIPAP - agree with current diuretic regimen; continue to monitor renal function and UOP closely     2. CAD  Assessment / Plan  S/p CANDELARIA x3; denies chest pain or anginal equivalent   Continue ASA unless otherwise contraindicated    3. Atrial fibrillation  Assessment / Plan  S/p ablation x2; s/p ICD  Currently on sotalol; Coumadin management per pharmacy    4. CKD   Assessment / Plan  Renal function appears to be at baseline; continue to monitor closely with diuresis    5. Moderate-severe Pulmonary HTN  Assessment / Plan  Primarily WHO group 3 - follows with Dr. Payton in pulmonary clinic and Dr. Morales    Clinically Significant Risk Factors Present on Admission               # Drug Induced Coagulation Defect: home medication list includes an anticoagulant medication    # Hypertension: Noted on problem list   # Non-Invasive mechanical ventilation: current O2 Device: BiPAP/CPAP  # Acute hypoxic respiratory failure: continue supplemental O2 as needed     # Obesity: Estimated body mass index is 36 kg/m  as calculated from the following:    Height as of this encounter: 1.905 m (6' 3\").    Weight as of this encounter: 130.6 kg (288 lb).           Cardiac Arrhythmia: Atrial fibrillation: Unspecified    Fluid overload, unspecified, Other fluid overload and Other disorders of electrolyte and fluid balance, not elsewhere classified    CKD POA List: Stage 3b (GFR 30-44)    Other Pulmonary Conditions: Interstitial lung disease    History of Present Illness/Subjective    Mr. Buck Sinclair is a 78 year old male with a PMHx significant for (per Dr. Garrett's note)  HFrEF/RV dysfunction due to nonischemic cardiomyopathy (out of proportion to CAD) with " "LVEF most recently noted to be 50%, persistent AF s/p multiple electrical cardioversions and extensive catheter ablations x2 in 2011, CAD s/p CANDELARIA x3 to the gphcukun-ff-huiwen LAD, COPD on home O2, Medtronic dual-chamber permanent pacemaker placement in 1999 replaced with a Medtronic dual-chamber implantable cardiac defibrillator on 6/11/2014, PHTN, HTN, and HLD presenting in acute hypoxic respiratory failure in the setting of ADHF    Today, Mr. Bone endorses progressive fluid retention, orthopnea, edema, dyspnea on exertion and at rest for some time; reports that his home dose of lasix is prescribed at needed, but recently the doses have been less and less effective; We discussed HF diet and lifestyle modifications including the 2 g Na and 2L fluid restrictions. He does not currently adhere, but will do so moving forward; Management plan as detailed above    ECG: Personally reviewed. Paced rhythm    ECHO (personnaly Reviewed): repeat study pending   Left ventricular size, wall motion and function are normal. The ejection  fraction is 55-60%.  Global right ventricular function is mildly to moderately reduced.  Moderate tricuspid insufficiency is present.  RVSP is estimated 73mmHg.Moderate pulmonary hypertension is present.  IVC diameter >2.1 cm collapsing <50% with sniff suggests a high RA pressure  estimated at 15 mmHg or greater.     When compared with prior study of 12/08/2021, filling pressures/PASP are  higher now.        Physical Examination Review of Systems   /66   Pulse 79   Temp 97.7  F (36.5  C) (Axillary)   Resp 17   Ht 1.905 m (6' 3\")   Wt 130.6 kg (288 lb)   SpO2 98%   BMI 36.00 kg/m    Body mass index is 36 kg/m .  Wt Readings from Last 3 Encounters:   05/18/23 130.6 kg (288 lb)   11/16/22 123.3 kg (271 lb 14.4 oz)   11/08/22 118.8 kg (262 lb)     General Appearance:   no distress, normal body habitus   ENT/Mouth: membranes moist, no oral lesions or bleeding gums.      EYES:  no " scleral icterus, normal conjunctivae   Neck: no carotid bruits or thyromegaly   Chest/Lungs:   lungs are clear to auscultation, no rales or wheezing, equal chest wall expansion    Cardiovascular:   Regular. Normal first and second heart sounds with no murmurs, rubs, or gallops; the carotid, radial and posterior tibial pulses are intact, + JVD and LE edema bilaterally    Abdomen:  no organomegaly, masses, bruits, or tenderness; bowel sounds are present   Extremities: no cyanosis or clubbing   Skin: no xanthelasma, warm.    Neurologic: normal gait, normal  bilateral, no tremors     Psychiatric: alert and oriented x3, calm     A complete 10 systems ROS was reviewed  And is negative except what is listed in the HPI.          Medical History  Surgical History Family History Social History   Past Medical History:   Diagnosis Date     Anemia      Asthma without status asthmaticus 5/5/2021     BPH (benign prostatic hyperplasia)      Cardiomyopathy (H)      COPD, group B, by GOLD 2017 classification (H)      Coronary artery disease due to calcified coronary lesion      Dyslipidemia, goal LDL below 70      Essential hypertension      History of transfusion      Persistent atrial fibrillation (H)      Pneumonia of left lower lobe due to infectious organism 10/4/2017     Skin cancer of trunk      Status post catheter ablation of atrial fibrillation 6/7/2017    PVI 4-2011 (Cryo/PVI + roof line + CTI line) Re-do PVI 7-2011 (RFA/PVI + CFE + VIDYA + confirmed CTI line)     Ventricular tachycardia (H)     Past Surgical History:   Procedure Laterality Date     CARDIAC DEFIBRILLATOR PLACEMENT       CARDIOVERSION  07/11/2018    x20, last 2/12/15, 10/2015, 11/18/16, 6/16/17 by Lauren Foster CNP     CARDIOVERSION  07/11/2018     CARDIOVERSION  11/19/2021     COLONOSCOPY N/A 4/28/2017    Procedure: COLONOSCOPY with 2 ascending polyps and 1 transverse polyp;  Surgeon: Jose Whittington MD;  Location: [facility];  Service:      CV  CORONARY ANGIOGRAM N/A 9/20/2017    Procedure: Coronary Angiogram;  Surgeon: Sergio Cervantes MD;  Location: [facility] Cath Lab;  Service:      CV CORONARY ANGIOGRAM N/A 1/24/2022    Procedure: Coronary Angiogram;  Surgeon: Christi Saunders MD;  Location: [facility] CATH LAB CV     CV LEFT HEART CATH N/A 1/24/2022    Procedure: Left Heart Cath;  Surgeon: Christi Saunders MD;  Location: [facility] CATH LAB CV     CV RIGHT AND LEFT HEART CATH N/A 1/24/2022    Procedure: Right and Left Heart Catherization;  Surgeon: Christi Saunders MD;  Location: [facility] CATH LAB CV     EP ICD GENERATOR REPLACEMENT DUAL N/A 10/28/2022    Procedure: Implantable Cardioverter Defibrillator Generator Replacement Dual;  Surgeon: Elo Collins MD;  Location: [facility] CATH LAB CV     EP ICD INSERT       FRACTURE SURGERY Left     wrist     INGUINAL HERNIA REPAIR Left 1967    while in the [employer] in [employer] after 13 month in [place]     INSERT / REPLACE / REMOVE PACEMAKER       IR MISCELLANEOUS PROCEDURE  4/30/2014     OTHER SURGICAL HISTORY      left hand surgery---tendon repair     TX ABLATE HEART DYSRHYTHM FOCUS  04/2011    Catheter Ablation Atrial Fibrillation PVI Apr 2011 (Cryo+RF-PVI + roof line + CTI line)     TX ABLATE HEART DYSRHYTHM FOCUS  07/2011    Re-do PVI Jul 2011 (RFA-PVI + CFE + VIDYA + confirmation of CTI line)     TOTAL SHOULDER REPLACEMENT Right 03/03/2016    Dr. Abernathy of [facility]     WRIST SURGERY Left      ZZC MYERS W/O FACETEC FORAMOT/DSKC 1/2 VRT SEG, CERVICAL      Laminectomy Lumbar;  Recorded: 03/09/2012;    no family history of premature coronary artery disease Social History     Socioeconomic History     Marital status:      Spouse name: Not on file     Number of children: Not on file     Years of education: Not on file     Highest education level: Not on file   Occupational History     Not on file   Tobacco Use     Smoking status: Former     Packs/day: 1.00     Years: 4.00     Pack years:  4.00     Types: Cigarettes     Quit date: 1968     Years since quittin.4     Smokeless tobacco: Never   Vaping Use     Vaping status: Never Used   Substance and Sexual Activity     Alcohol use: Yes     Alcohol/week: 2.0 standard drinks of alcohol     Comment: Alcoholic Drinks/day: 1 beer per week     Drug use: No     Sexual activity: Yes     Partners: Female     Birth control/protection: Post-menopausal   Other Topics Concern     Parent/sibling w/ CABG, MI or angioplasty before 65F 55M? Not Asked   Social History Narrative    Preloaded 2013     Social Determinants of Health     Financial Resource Strain: Not on file   Food Insecurity: Not on file   Transportation Needs: Not on file   Physical Activity: Not on file   Stress: Not on file   Social Connections: Not on file   Intimate Partner Violence: Not on file   Housing Stability: Not on file           Lab Results    Chemistry/lipid CBC Cardiac Enzymes/BNP/TSH/INR   Lab Results   Component Value Date    CHOL 109 10/06/2022    HDL 58 10/06/2022    TRIG 39 10/06/2022    BUN 38.2 (H) 2023     2023    CO2 25 2023    Lab Results   Component Value Date    WBC 6.8 2023    HGB 11.3 (L) 2023    HCT 37.4 (L) 2023     (H) 2023     (L) 2023    Lab Results   Component Value Date     (H) 2020    TSH 1.32 02/15/2022    INR 3.4 (H) 2023     No results found for: CKTOTAL, CKMB, TROPONINI       Weight:    Wt Readings from Last 3 Encounters:   23 130.6 kg (288 lb)   22 123.3 kg (271 lb 14.4 oz)   22 118.8 kg (262 lb)       Allergies  Allergies   Allergen Reactions     Adhesive Tape Other (See Comments)     ADHESIVE TAPE; SKIN IRRITATION; Skin pulled off with foam tape       Amiodarone      ADVERSE REACTION.  Sunlight sensitivity.     Lisinopril          Surgical History  Past Surgical History:   Procedure Laterality Date     CARDIAC DEFIBRILLATOR PLACEMENT        CARDIOVERSION  07/11/2018    x20, last 2/12/15, 10/2015, 11/18/16, 6/16/17 by Lauren Foster CNP     CARDIOVERSION  07/11/2018     CARDIOVERSION  11/19/2021     COLONOSCOPY N/A 4/28/2017    Procedure: COLONOSCOPY with 2 ascending polyps and 1 transverse polyp;  Surgeon: Jose Whittington MD;  Location: [facility];  Service:      CV CORONARY ANGIOGRAM N/A 9/20/2017    Procedure: Coronary Angiogram;  Surgeon: Sergio Cervantes MD;  Location: [facility] Cath Lab;  Service:      CV CORONARY ANGIOGRAM N/A 1/24/2022    Procedure: Coronary Angiogram;  Surgeon: Christi Saunders MD;  Location: [facility] CATH LAB CV     CV LEFT HEART CATH N/A 1/24/2022    Procedure: Left Heart Cath;  Surgeon: Christi Saunders MD;  Location: [facility] CATH LAB CV     CV RIGHT AND LEFT HEART CATH N/A 1/24/2022    Procedure: Right and Left Heart Catherization;  Surgeon: Christi Saunders MD;  Location: [facility] CV     EP ICD GENERATOR REPLACEMENT DUAL N/A 10/28/2022    Procedure: Implantable Cardioverter Defibrillator Generator Replacement Dual;  Surgeon: Elo Collins MD;  Location: [facility] CV     EP ICD INSERT       FRACTURE SURGERY Left     wrist     INGUINAL HERNIA REPAIR Left 1967    while in the [employer] in [place] after 13 month in [place]     INSERT / REPLACE / REMOVE PACEMAKER       IR MISCELLANEOUS PROCEDURE  4/30/2014     OTHER SURGICAL HISTORY      left hand surgery---tendon repair     [place] ABLATE HEART DYSRHYTHM FOCUS  04/2011    Catheter Ablation Atrial Fibrillation PVI Apr 2011 (Cryo+RF-PVI + roof line + CTI line)     [place] ABLATE HEART DYSRHYTHM FOCUS  07/2011    Re-do PVI Jul 2011 (RFA-PVI + CFE + VIDYA + confirmation of CTI line)     TOTAL SHOULDER REPLACEMENT Right 03/03/2016    Dr. Abernathy of [facility]     WRIST SURGERY Left      ZZC MYERS W/O FACETEC FORAMOT/DSKC 1/2 VRT SEG, CERVICAL      Laminectomy Lumbar;  Recorded: 03/09/2012;       Social History  Tobacco:   History   Smoking Status     Former      Packs/day: 1.00     Years: 4.00     Types: Cigarettes     Quit date: 1/1/1968   Smokeless Tobacco     Never    Alcohol:   Social History    Substance and Sexual Activity      Alcohol use: Yes        Alcohol/week: 2.0 standard drinks of alcohol        Comment: Alcoholic Drinks/day: 1 beer per week   Illicit Drugs:   History   Drug Use No       Family History  Family History   Problem Relation Age of Onset     Cancer Mother         leukemia     Cancer Father         bladder     Cancer Sister         breast with lung met.     Aneurysm Sister      CABG Brother      CABG Brother      Valvular heart disease Brother         valve replacement          Deon Iglesias MD on 5/18/2023      cc: Vivek Guerrero,

## 2023-05-18 NOTE — PLAN OF CARE
Problem: Plan of Care - These are the overarching goals to be used throughout the patient stay.    Goal: Plan of Care Review  Description: The Plan of Care Review/Shift note should be completed every shift.  The Outcome Evaluation is a brief statement about your assessment that the patient is improving, declining, or no change.  This information will be displayed automatically on your shift note.  Outcome: Progressing     Problem: Risk for Delirium  Goal: Improved Sleep  Outcome: Progressing     Problem: Plan of Care - These are the overarching goals to be used throughout the patient stay.    Goal: Readiness for Transition of Care  Outcome: Progressing   Goal Outcome Evaluation:

## 2023-05-18 NOTE — ED TRIAGE NOTES
Patient was sent from the Pulmonology clinic for low O2 saturation.  Patient reports for the last year he has been using 10L of O2 at home to keep himself around 92%. Room air patient was around 78%     Triage Assessment     Row Name 05/18/23 0843       Triage Assessment (Adult)    Airway WDL WDL       Respiratory WDL    Respiratory WDL X;nailbeds  Hypoxia    Nailbeds cyanotic       Skin Circulation/Temperature WDL    Skin Circulation/Temperature WDL WDL       Cardiac WDL    Cardiac WDL WDL       Peripheral/Neurovascular WDL    Peripheral Neurovascular WDL WDL       Cognitive/Neuro/Behavioral WDL    Cognitive/Neuro/Behavioral WDL WDL

## 2023-05-18 NOTE — ED NOTES
Bed: JNED-09  Expected date: 5/18/23  Expected time:   Means of arrival:   Comments:  [facility]  78 male  SOB, Hypoxia

## 2023-05-18 NOTE — ED NOTES
Expected Patient Referral to ED  8:15 AM    Referring Clinic/Provider:  Pulmonary    Reason for referral/Clinical facts:  74% on 10L.  Hx of CHF.  Increased edema.  On warfarin.  Likely bipap and lasix. Usually on 4L NC.     Recommendations provided:  Send to ED for further evaluation    Caller was informed that this institution does possess the capabilities and/or resources to provide for patient and should be transferred to our facility.    Discussed that if direct admit is sought and any hurdles are encountered, this ED would be happy to see the patient and evaluate.    Informed caller that recommendations provided are recommendations based only on the facts provided and that they responsible to accept or reject the advice, or to seek a formal in person consultation as needed and that this ED will see/treat patient should they arrive.      Isaias Rodriguez MD  [facility] EMERGENCY DEPARTMENT  [address] [place] [id number]  [phone]       Isaias Rodriguez MD  05/18/23 0817

## 2023-05-18 NOTE — PROGRESS NOTES
Patient seen in ED.  Patient RR 33 Sao2 on NRB 15lpm 96%.  Patient placed on BiPAP 14/6 50% Sao2 increased to 99%.  ABG drawn.  Fio2 decreased to 40% post ABG.  Duo neb given in-line with BiPAP.  BS decreased.  Santino Caban, RT

## 2023-05-18 NOTE — H&P
[facility]    History and Physical - Hospitalist Service       Date of Admission:  5/18/2023    Assessment & Plan      HFrEF with ADHF: deemed nonischemic dilated cardiomyopathy.  Most recent TTE (9/13/2022) LVEF 55 to 60%, moderate TI, global RV function mild-moderately reduced, RSVP approximately 73 mmHg indicative of moderate pulmonary hypertension, estimated RA pressure 15 mmHg or greater.  -TTE  -IV Lasix 80mg TID with hold parameters (Normally on 80 mg twice daily but lately at home due to increasing shortness of breath increased to 200 mg TDD).  -Cardiology consult  -Continue chronic sotalol  - Hold losartan while diuresing  -CHF admission order set implemented    Severe COPD; moderate-to-severe pulmonary hypertension; acute on chronic hypoxic respiratory failure:   -Improved acute hypoxia and now off BiPAP and placed on 6 L PNC  -Current dyspnea seems more heart failure related from fluid overload/pulmonary edema and not COPD exacerbation.    -Significant improvement in acute hypoxia and respiratory distress on presentation.  - Continue pulmonary medications/inhalers as ordered  -Received Solu-Medrol 125 mg IV x1 in the emergency department.  We will hold further systemic steroid therapy at this time.  -Wean oxygen as able.  Goal SPO2 88-92%.  Avoid hyperoxia.  -Advised patient that his chronic use over the past several months of 10 L of continuous oxygen per nasal cannula not advised to go above 6 and last tubing size is increased.  -Empiric IV antibiotics     Possible CAP: Chest x-ray in emergency department showed new patchy airspace opacities in right lower lobe suggestive of area of pneumonitis/early proximal pneumonia.  Emphysematous changes.  Cardiomegaly and mild pulmonary vascular congestion.  -Blood culture x2, procalcitonin, respiratory panel PCR  - Empiric IV ceftriaxone/doxycycline    Coronary artery disease: S/P CANDELARIA x3.  Coronary angiogram 1/24/2022 showed mild CAD with  patent LAD stents in the proximal and mid LAD, left circumflex third obtuse marginal branch 30% stenosis, mid RCA lesion 20% stenosis.  LVEDP and PCWP normal.  PAP 66/24 mmHg with a mean pressure of 37 mmHg.  Shikha and TD cardiac outputs both 5.7 L/min  -On warfarin, sotalol, statin  - No anginal complaints  - Troponin T-HS 31 ->30    Medtronic dual-chamber implantable cardiac defibrillator placement on 6/11/2014 and generator change on 10/28/2022 for primary prevention of sudden cardiac death.  -Interrogate device if not already performed    History of Medtronic dual-chamber permanent pacemaker placement in 1999 with generator replacement in 2005 and device removal in 2014.    Persistent atrial fibrillation: per Dr. Garrett's last note, status post multiple electrical cardioversions, most recently on 11/19/2021. He underwent complex catheter ablation x2 in 2011. Overall fairly low burden of atrial arrhythmia based on his device interrogations. [id number] score is at least 5 (congestive heart failure, hypertension, age 75 or greater, coronary artery disease).  -warfarin, pharmacy to dose  - INR 3.42    CKD stage III: Baseline creatinine 1.5-1.6  -stable  -monitor closely    HTN:  -Hold losartan while on IV Lasix  -Sotalol    HLD:  -lipitor    Chronic macrocytic anemia: TSH normal.  Defer remainder of work-up as outpatient to PCP         Diet: NPO for Medical/Clinical Reasons Except for: Meds, Ice Chips    DVT Prophylaxis: warfarin  Sawyer Catheter: Not present  Lines: None     Cardiac Monitoring: ACTIVE order. Indication: Acute decompensated heart failure (48 hours)  Code Status: Full Code      Clinically Significant Risk Factors Present on Admission               # Drug Induced Coagulation Defect: home medication list includes an anticoagulant medication    # Hypertension: Noted on problem list   # Non-Invasive mechanical ventilation: current O2 Device: BiPAP/CPAP  # Acute hypoxic respiratory failure: continue  "supplemental O2 as needed     # Obesity: Estimated body mass index is 36 kg/m  as calculated from the following:    Height as of this encounter: 1.905 m (6' 3\").    Weight as of this encounter: 130.6 kg (288 lb).            Disposition Plan      Expected Discharge Date: 05/20/2023                  Fortino Cook DO, DO  Hospitalist Service  [facility]  Securely message with [employer] (more info)  Text page via [employer] Paging/Directory     ______________________________________________________________________    Chief Complaint   SOB    History of Present Illness   Buck Sinclair is a 78 year old male who has a past medical history significant for HFrEF, status post dual-chamber ICD, CAD, hypertension, persistent atrial fibrillation, chronic hypoxic respiratory failure most recently 10 L PNC, severe COPD who presented to the emergency department due to acute on chronic shortness of breath.  Patient has had progressively worsening dyspnea on exertion along with this paroxysmal nocturnal dyspnea and edema/fluid retention.  No chest pain.  No cough.  No abdominal pain.  No melena, hematochezia or hematemesis.  Patient was briefly seen in the pulmonology clinic by Dr. Payton this morning and upon patient's arrival he looked cyanotic, tachypneic and his SPO2 was in the 70s on 10 L/min.  Ambulance contacted right away and transported patient to the emergency department.  In the emergency department, patient was noted to be on respiratory distress and was therefore placed on BiPAP which has now been weaned down to 6 L nasal cannula.  Admitted for further evaluation and management      Past Medical History    Past Medical History:   Diagnosis Date     Anemia      Asthma without status asthmaticus 5/5/2021     BPH (benign prostatic hyperplasia)      Cardiomyopathy (H)      COPD, group B, by GOLD 2017 classification (H)      Coronary artery disease due to calcified coronary lesion      Dyslipidemia, " goal LDL below 70      Essential hypertension      History of transfusion      Persistent atrial fibrillation (H)      Pneumonia of left lower lobe due to infectious organism 10/4/2017     Skin cancer of trunk      Status post catheter ablation of atrial fibrillation 6/7/2017    PVI 4-2011 (Cryo/PVI + roof line + CTI line) Re-do PVI 7-2011 (RFA/PVI + CFE + VIDYA + confirmed CTI line)     Ventricular tachycardia (H)        Past Surgical History   Past Surgical History:   Procedure Laterality Date     CARDIAC DEFIBRILLATOR PLACEMENT       CARDIOVERSION  07/11/2018    x20, last 2/12/15, 10/2015, 11/18/16, 6/16/17 by Lauren Foster CNP     CARDIOVERSION  07/11/2018     CARDIOVERSION  11/19/2021     COLONOSCOPY N/A 4/28/2017    Procedure: COLONOSCOPY with 2 ascending polyps and 1 transverse polyp;  Surgeon: Jose Whittington MD;  Location: [facility] GI;  Service:      CV CORONARY ANGIOGRAM N/A 9/20/2017    Procedure: Coronary Angiogram;  Surgeon: Sergio Cervantes MD;  Location: [facility] Cath Lab;  Service:      CV CORONARY ANGIOGRAM N/A 1/24/2022    Procedure: Coronary Angiogram;  Surgeon: Christi Saunders MD;  Location: [facility] CATH LAB CV     CV LEFT HEART CATH N/A 1/24/2022    Procedure: Left Heart Cath;  Surgeon: Christi Saunders MD;  Location: [facility] LAB CV     CV RIGHT AND LEFT HEART CATH N/A 1/24/2022    Procedure: Right and Left Heart Catherization;  Surgeon: Christi Saunders MD;  Location: [facility] CV     EP ICD GENERATOR REPLACEMENT DUAL N/A 10/28/2022    Procedure: Implantable Cardioverter Defibrillator Generator Replacement Dual;  Surgeon: Elo Collins MD;  Location: [facility] CATH LAB CV     EP ICD INSERT       FRACTURE SURGERY Left     wrist     INGUINAL HERNIA REPAIR Left 1967    while in the [employer] in [place] after 13 month in [place]     INSERT / REPLACE / REMOVE PACEMAKER       IR MISCELLANEOUS PROCEDURE  4/30/2014     OTHER SURGICAL HISTORY      left hand surgery---tendon repair      DC ABLATE HEART DYSRHYTHM FOCUS  2011    Catheter Ablation Atrial Fibrillation PVI 2011 (Cryo+RF-PVI + roof line + CTI line)     DC ABLATE HEART DYSRHYTHM FOCUS  2011    Re-do PVI 2011 (RFA-PVI + CFE + VIDYA + confirmation of CTI line)     TOTAL SHOULDER REPLACEMENT Right 2016    Dr. Abernathy of [facility]     WRIST SURGERY Left      ZZC MYERS W/O FACETEC FORAMOT/DSKC  VRT SEG, CERVICAL      Laminectomy Lumbar;  Recorded: 2012;       Prior to Admission Medications   Prior to Admission Medications   Prescriptions Last Dose Informant Patient Reported? Taking?   Ascorbic Acid (VITAMIN C) 500 MG CAPS   Yes No   Sig: Take 1,000 mg by mouth daily   FLUoxetine (PROZAC) 20 MG capsule   Yes No   Sig: TAKE 1 CAPSULE BY MOUTH EVERY DAY   MAG64 64 MG TBEC CR tablet   No No   Sig: TAKE 2 TABLETS BY MOUTH EVERY DAY   OXYGEN-HELIUM IN   Yes No   Si-5 L    acetaminophen (TYLENOL) 325 MG tablet   Yes No   Sig: Take 650 mg by mouth 2 times daily   albuterol (PROAIR HFA/PROVENTIL HFA/VENTOLIN HFA) 108 (90 Base) MCG/ACT inhaler   Yes No   Sig: Inhale 2 puffs into the lungs every 4 hours as needed for shortness of breath / dyspnea or wheezing   atorvastatin (LIPITOR) 40 MG tablet   Yes No   Sig: Take 40 mg by mouth daily   budesonide-formoterol (SYMBICORT) 160-4.5 MCG/ACT Inhaler   Yes No   Sig: Inhale 2 puffs into the lungs 2 times daily   co-enzyme Q-10 100 MG CAPS capsule   Yes No   Sig: Take 2 capsules (200 mg) by mouth daily   fish oil-omega-3 fatty acids 1000 MG capsule   Yes No   Sig: Take 1 g by mouth 2 times daily   furosemide (LASIX) 80 MG tablet   No No   Sig: TAKE 1 TABLET BY MOUTH TWICE DAILY. MAY TAKE ONE-HALF TABLET DAILY AS NEEDED FOR RETAINED FLUID. Strength: 80 mg   losartan (COZAAR) 50 MG tablet   No No   Sig: Take 1 tablet (50 mg) by mouth daily   Patient taking differently: Take 25 mg by mouth daily   multivitamin, therapeutic (THERA-VIT) TABS tablet   Yes No   Sig:  "Take 1 tablet by mouth daily   nitroGLYcerin (NITROSTAT) 0.4 MG sublingual tablet   No No   Sig: One tablet under the tongue every 5 minutes if needed for chest pain. May repeat every 5 minutes for a maximum of 3 doses in 15 minutes\"   polyethylene glycol (MIRALAX) 17 GM/Dose powder   Yes No   Sig: Take 17 g by mouth daily    sotalol (BETAPACE) 160 MG tablet   No No   Sig: TAKE 1 TABLET(160 MG) BY MOUTH TWICE DAILY   tiotropium (SPIRIVA) 18 MCG inhaled capsule   Yes No   Sig: Inhale 18 mcg into the lungs daily   vitamin D2 (ERGOCALCIFEROL) 88587 units (1250 mcg) capsule   Yes No   Sig: TAKE 1 CAPSULE BY MOUTH 2 TIMES A WEEK   warfarin ANTICOAGULANT (COUMADIN) 2.5 MG tablet   No No   Sig: As of 6/23: 5 mg every Mon, Thu; 2.5 mg all other days      Facility-Administered Medications: None        Physical Exam   Vital Signs: Temp: 97.7  F (36.5  C) Temp src: Axillary BP: 118/72 Pulse: 80   Resp: 20 SpO2: 90 % O2 Device: BiPAP/CPAP    Weight: 288 lbs 0 oz    Physical Examination:   General appearance - alert, and in no distress  Eyes - pupils equal and reactive, extraocular eye movements intact, sclera anicteric  Mouth - mucous membranes pasty, pharynx normal without lesions  Lungs - diffusely decreased, no active wheezing, currently not labored  Heart - irreg, irreg, + JVD to mandible,. + peripheral edema.  Abdomen - soft, nontender, nondistended, no masses or organomegaly, BS+  Neurological - alert, oriented, normal speech, no focal findings or movement disorder noted  Skin - no c/c/p    Lab/imaging reviewed    Case d/w Dr. Langford  "

## 2023-05-18 NOTE — ED PROVIDER NOTES
EMERGENCY DEPARTMENT ENCOUNTER      NAME: Buck Sinclair  AGE: 78 year old male  YOB: 1945  MRN: [id number]  EVALUATION DATE & TIME: 2023  8:33 AM    PCP: Vivek Guerrero    ED PROVIDER: Jesse Langford D.O.      Chief Complaint   Patient presents with     Shortness of Breath       FINAL IMPRESSION:  1. Acute on chronic congestive heart failure, unspecified heart failure type (H)        ED COURSE & MEDICAL DECISION MAKIN:28 AM I met with the patient to gather history and to perform my initial exam. I discussed the plan for care while in the Emergency Department.  9:43 AM I spoke with Dr. Cook, hospitalist, who accepts the patient         Pertinent Labs & Imaging studies reviewed. (See chart for details)  78 year old male presents to the Emergency Department for evaluation of increasing shortness of breath and oxygen requirements.  Patient was visiting his pulmonologist today in clinic, but they discovered that he was saturating in the 70s and his normal home to oxygen.  Patient does have known history of CHF and COPD.  Initial concern was for COPD versus CHF exacerbation.  Less likely to represent PE.  Potential also for pneumonia based on CXR, though unlikely as the patient has no significant cough, no elevation in WBC, and no fever, thus doubt this etiology at this time.  On exam, there is no significant wheezing, but he did have significant pedal edema.  Concerning for the potential for CHF exacerbation or COPD.  No change in his symptoms after single neb, though we did treat him with Solu-Medrol as well in case there was some COPD as well.  Patient was started on BiPAP due to his oxygen demand and increased work of breathing.  As labs returned, it was obvious that he was not retaining CO2 as would be with a COPD exacerbation, rather had a very significantly elevated BNP.  With this I am concerned that his symptoms are related to CHF exacerbation.  Patient is already on Lasix at home.   Did plan to start on IV Lasix here, and 40 IV was ordered.  Patient will be admitted to the hospitalist for further management.      Critical Care  Performed by: PAN VERMA  Authorized by: PAN VERMA  Total critical care time: 40 minutes  Critical care time was exclusive of separately billable procedures and treating other patients.  Critical care was necessary to treat or prevent imminent or life-threatening deterioration of the following conditions: CHF exacerbation  Critical care was time spent personally by me on the following activities: development of treatment plan with patient or surrogate, discussions with consultants, examination of patient, evaluation of patient's response to treatment, obtaining history from patient or surrogate, ordering and performing treatments and interventions, ordering and review of laboratory studies, ordering and review of radiographic studies and re-evaluation of patient's condition, this excludes any separately billable procedures.          Medical Decision Making    History:    Supplemental history from: Documented in chart, if applicable and EMS    External Record(s) reviewed: Documented in chart, if applicable. and Inpatient Record: [facility] (5/18/23)    Work Up:    Chart documentation includes differential considered and any EKGs or imaging independently interpreted by provider, where specified.    In additional to work up documented, I considered the following work up: Documented in chart, if applicable.    External consultation:    Discussion of management with another provider: Documented in chart, if applicable and Hospitalist    Complicating factors:    Care impacted by chronic illness: Anticoagulated State, Chronic Lung Disease, Heart Disease, Hypertension and Other: CHF, Persistent Atrial Fibrillation, & ICD in situ    Care affected by social determinants of health: N/A    Disposition considerations: Admit.        At  the conclusion of the encounter I discussed the results of all of the tests and the disposition. The questions were answered. The patient or family acknowledged understanding and was agreeable with the care plan.        HPI    Patient information was obtained from: Patient and EMS    Use of : N/A       Buck Sinclair is a 78 year old male with a pertinent medical history of hypertension, persistent atrial fibrillation, CAD, COPD, and CHF who presents to this ED by ambulance for evaluation of shortness of breath.     Per Chart Review: Patient arrives from the Pulmonology Clinic at [facility] on 5/18/23 for shortness of breath. Patient is regularly on 4L of O2 at home, but today his SpO2 levels were in the low 70s on 10L. Patient transferred to the Emergency Department for further work up and likely required hospital admission.     Per EMS, patient arrives from the pulmonology clinic after reporting increasing dyspnea for the past year. At the clinic, he was found to have extremely low SpO2 levels and was transferred to the ED for further evaluation and testing.     Patient reports episodes of severe shortness of breath upon waking up for the past 6-8 months that has been progressively worsening. He is supposed to be on 4L of O2 at home, but reports being on 10L. Patient denies fever, sore throat, nausea, chest pain, lightheadedness, cough, congestion, or additional symptoms or complaints at this time.     REVIEW OF SYSTEMS  Constitutional:  Denies fever, chills, weight loss or weakness  Eyes:  No pain, discharge, redness  HENT:  Denies sore throat, ear pain, congestion  Respiratory: No wheeze or cough. Positive for shortness of breath   Cardiovascular:  No CP, palpitations  GI:  Denies abdominal pain, nausea, vomiting, diarrhea  : Denies dysuria, hematuria  Musculoskeletal:  Denies any new muscle/joint pain, swelling or loss of function.  Skin:  Denies rash,  pallor  Neurologic:  Denies headache, focal weakness or sensory changes  Lymph: Denies swollen nodes    All other systems negative unless noted in HPI.    PAST MEDICAL HISTORY:  Past Medical History:   Diagnosis Date     Anemia      Asthma without status asthmaticus 5/5/2021     BPH (benign prostatic hyperplasia)      Cardiomyopathy (H)      COPD, group B, by GOLD 2017 classification (H)      Coronary artery disease due to calcified coronary lesion      Dyslipidemia, goal LDL below 70      Essential hypertension      History of transfusion      Persistent atrial fibrillation (H)      Pneumonia of left lower lobe due to infectious organism 10/4/2017     Skin cancer of trunk      Status post catheter ablation of atrial fibrillation 6/7/2017    PVI 4-2011 (Cryo/PVI + roof line + CTI line) Re-do PVI 7-2011 (RFA/PVI + CFE + VIDYA + confirmed CTI line)     Ventricular tachycardia (H)        PAST SURGICAL HISTORY:  Past Surgical History:   Procedure Laterality Date     CARDIAC DEFIBRILLATOR PLACEMENT       CARDIOVERSION  07/11/2018    x20, last 2/12/15, 10/2015, 11/18/16, 6/16/17 by Lauren Foster CNP     CARDIOVERSION  07/11/2018     CARDIOVERSION  11/19/2021     COLONOSCOPY N/A 4/28/2017    Procedure: COLONOSCOPY with 2 ascending polyps and 1 transverse polyp;  Surgeon: Jose Whittington MD;  Location: [facility] GI;  Service:      CV CORONARY ANGIOGRAM N/A 9/20/2017    Procedure: Coronary Angiogram;  Surgeon: Sergio Cervantes MD;  Location: [facility] Cath Lab;  Service:      CV CORONARY ANGIOGRAM N/A 1/24/2022    Procedure: Coronary Angiogram;  Surgeon: Christi Saunders MD;  Location: [facility] CV     CV LEFT HEART CATH N/A 1/24/2022    Procedure: Left Heart Cath;  Surgeon: Christi Saunders MD;  Location: [facility] LAB CV     CV RIGHT AND LEFT HEART CATH N/A 1/24/2022    Procedure: Right and Left Heart Catherization;  Surgeon: Christi Saunders MD;  Location: [facility] LAB CV     EP ICD GENERATOR  REPLACEMENT DUAL N/A 10/28/2022    Procedure: Implantable Cardioverter Defibrillator Generator Replacement Dual;  Surgeon: Elo Collins MD;  Location: [facility] LAB CV     EP ICD INSERT       FRACTURE SURGERY Left     wrist     INGUINAL HERNIA REPAIR Left 1967    while in the [employer] in [place] after 13 month in [place]     INSERT / REPLACE / REMOVE PACEMAKER       IR MISCELLANEOUS PROCEDURE  4/30/2014     OTHER SURGICAL HISTORY      left hand surgery---tendon repair     VT ABLATE HEART DYSRHYTHM FOCUS  04/2011    Catheter Ablation Atrial Fibrillation PVI Apr 2011 (Cryo+RF-PVI + roof line + CTI line)     VT ABLATE HEART DYSRHYTHM FOCUS  07/2011    Re-do PVI Jul 2011 (RFA-PVI + CFE + VIDYA + confirmation of CTI line)     TOTAL SHOULDER REPLACEMENT Right 03/03/2016    Dr. Abernathy of [facility]     WRIST SURGERY Left      ZZC MYERS W/O FACETEC FORAMOT/DSKC 1/2 VRT SEG, CERVICAL      Laminectomy Lumbar;  Recorded: 03/09/2012;         CURRENT MEDICATIONS:    Current Facility-Administered Medications   Medication     acetaminophen (TYLENOL) tablet 650 mg     bisacodyl (DULCOLAX) suppository 10 mg     Continuing ACE inhibitor/ARB/ARNI from home medication list OR ACE inhibitor/ARB/ARNI order already placed during this visit     Continuing beta blocker from home medication list OR beta blocker order already placed during this visit     furosemide (LASIX) injection 80 mg     lidocaine (LMX4) cream     lidocaine 1 % 0.1-1 mL     melatonin tablet 1 mg     ondansetron (ZOFRAN ODT) ODT tab 4 mg    Or     ondansetron (ZOFRAN) injection 4 mg     sodium chloride (PF) 0.9% PF flush 3 mL     sodium chloride (PF) 0.9% PF flush 3 mL     Current Outpatient Medications   Medication     acetaminophen (TYLENOL) 325 MG tablet     albuterol (PROAIR HFA/PROVENTIL HFA/VENTOLIN HFA) 108 (90 Base) MCG/ACT inhaler     Ascorbic Acid (VITAMIN C) 500 MG CAPS     atorvastatin (LIPITOR) 40 MG tablet     budesonide-formoterol  (SYMBICORT) 160-4.5 MCG/ACT Inhaler     co-enzyme Q-10 100 MG CAPS capsule     fish oil-omega-3 fatty acids 1000 MG capsule     FLUoxetine (PROZAC) 20 MG capsule     furosemide (LASIX) 80 MG tablet     losartan (COZAAR) 50 MG tablet     MAG64 64 MG TBEC CR tablet     multivitamin, therapeutic (THERA-VIT) TABS tablet     nitroGLYcerin (NITROSTAT) 0.4 MG sublingual tablet     OXYGEN-HELIUM IN     polyethylene glycol (MIRALAX) 17 GM/Dose powder     sotalol (BETAPACE) 160 MG tablet     tiotropium (SPIRIVA) 18 MCG inhaled capsule     vitamin D2 (ERGOCALCIFEROL) 44446 units (1250 mcg) capsule     warfarin ANTICOAGULANT (COUMADIN) 2.5 MG tablet         ALLERGIES:  Allergies   Allergen Reactions     Adhesive Tape Other (See Comments)     ADHESIVE TAPE; SKIN IRRITATION; Skin pulled off with foam tape       Amiodarone      ADVERSE REACTION.  Sunlight sensitivity.     Lisinopril        FAMILY HISTORY:  Family History   Problem Relation Age of Onset     Cancer Mother         leukemia     Cancer Father         bladder     Cancer Sister         breast with lung met.     Aneurysm Sister      CABG Brother      CABG Brother      Valvular heart disease Brother         valve replacement       SOCIAL HISTORY:  Social History     Socioeconomic History     Marital status:    Tobacco Use     Smoking status: Former     Packs/day: 1.00     Years: 4.00     Pack years: 4.00     Types: Cigarettes     Quit date: 1968     Years since quittin.4     Smokeless tobacco: Never   Vaping Use     Vaping status: Never Used   Substance and Sexual Activity     Alcohol use: Yes     Alcohol/week: 2.0 standard drinks of alcohol     Comment: Alcoholic Drinks/day: 1 beer per week     Drug use: No     Sexual activity: Yes     Partners: Female     Birth control/protection: Post-menopausal   Social History Narrative    Preloaded 2013       VITALS:  Patient Vitals for the past 24 hrs:   BP Temp Temp src Pulse Resp SpO2 Height Weight   23  "1035 118/72 -- -- 80 20 90 % -- --   05/18/23 1000 111/74 -- -- 79 14 98 % -- --   05/18/23 0900 110/66 -- -- 79 17 98 % -- --   05/18/23 0842 118/62 -- -- 79 15 99 % -- --   05/18/23 0839 119/73 97.7  F (36.5  C) Axillary 79 18 99 % 1.905 m (6' 3\") 130.6 kg (288 lb)       PHYSICAL EXAM    VITAL SIGNS: /72   Pulse 80   Temp 97.7  F (36.5  C) (Axillary)   Resp 20   Ht 1.905 m (6' 3\")   Wt 130.6 kg (288 lb)   SpO2 90%   BMI 36.00 kg/m      General Appearance: Well-appearing, well-nourished, no acute distress   Head:  Normocephalic, without obvious abnormality, atraumatic  Eyes:  PERRL, conjunctiva/corneas clear, EOM's intact,  ENT:  Lips, mucosa, and tongue normal, membranes are moist without pallor  Neck:  Normal ROM, symmetrical, trachea midline    Cardio:  Regular rate and rhythm, no murmur, rub or gallop, 2+ pulses symmetric in all extremities  Pulm:  Clear to auscultation bilaterally, increased rate and work of breathing  Abdomen:  Soft, non-tender, no rebound or guarding.  Musculoskeletal: Full ROM, 2+ edema bilateral lower extremities, no cyanosis, good ROM of major joints  Integument:  Warm, Dry, No erythema, No rash.    Neurologic:  Alert & oriented.  No focal deficits appreciated.  Ambulatory.  Psychiatric:  Affect normal, Judgment normal, Mood normal.      LABS  Results for orders placed or performed during the hospital encounter of 05/18/23 (from the past 24 hour(s))   Blood gas arterial   Result Value Ref Range    pH Arterial 7.48 (H) 7.37 - 7.44    pCO2 Arterial 31 (L) 35 - 45 mm Hg    pO2 Arterial 119 (H) 75 - 85 mm Hg    Bicarbonate Arterial 23 23 - 29 mmol/L    O2 Sat, Arterial 99.7 (H) 95.0 - 96.0 %    Oxyhemoglobin 98.4 (H) 95.0 - 96.0 %    Base Excess/Deficit (+/-) 0.0   mmol/L    Sample Stabilized Temperature 37.0 degrees C   CBC with platelets + differential    Narrative    The following orders were created for panel order CBC with platelets + differential.  Procedure                 "               Abnormality         Status                     ---------                               -----------         ------                     CBC with platelets and d...[[id number]]  Abnormal            Final result                 Please view results for these tests on the individual orders.   Basic metabolic panel   Result Value Ref Range    Sodium 144 136 - 145 mmol/L    Potassium 4.0 3.4 - 5.3 mmol/L    Chloride 106 98 - 107 mmol/L    Carbon Dioxide (CO2) 25 22 - 29 mmol/L    Anion Gap 13 7 - 15 mmol/L    Urea Nitrogen 38.2 (H) 8.0 - 23.0 mg/dL    Creatinine 1.64 (H) 0.67 - 1.17 mg/dL    Calcium 9.1 8.8 - 10.2 mg/dL    Glucose 122 (H) 70 - 99 mg/dL    GFR Estimate 43 (L) >60 mL/min/1.73m2   Troponin T, High Sensitivity (now)   Result Value Ref Range    Troponin T, High Sensitivity 31 (H) <=22 ng/L   Nt probnp inpatient   Result Value Ref Range    N terminal Pro BNP Inpatient 3,644 (H) 0 - 1,800 pg/mL   CBC with platelets and differential   Result Value Ref Range    WBC Count 6.8 4.0 - 11.0 10e3/uL    RBC Count 3.63 (L) 4.40 - 5.90 10e6/uL    Hemoglobin 11.3 (L) 13.3 - 17.7 g/dL    Hematocrit 37.4 (L) 40.0 - 53.0 %     (H) 78 - 100 fL    MCH 31.1 26.5 - 33.0 pg    MCHC 30.2 (L) 31.5 - 36.5 g/dL    RDW 16.6 (H) 10.0 - 15.0 %    Platelet Count 149 (L) 150 - 450 10e3/uL    % Neutrophils 64 %    % Lymphocytes 21 %    % Monocytes 11 %    % Eosinophils 4 %    % Basophils 0 %    % Immature Granulocytes 0 %    NRBCs per 100 WBC 1 (H) <1 /100    Absolute Neutrophils 4.3 1.6 - 8.3 10e3/uL    Absolute Lymphocytes 1.4 0.8 - 5.3 10e3/uL    Absolute Monocytes 0.7 0.0 - 1.3 10e3/uL    Absolute Eosinophils 0.3 0.0 - 0.7 10e3/uL    Absolute Basophils 0.0 0.0 - 0.2 10e3/uL    Absolute Immature Granulocytes 0.0 <=0.4 10e3/uL    Absolute NRBCs 0.0 10e3/uL   XR Chest Port 1 View    Narrative    EXAM: XR CHEST PORT 1 VIEW  LOCATION: [facility]  DATE/TIME: 5/18/2023 9:20 AM CDT    INDICATION:  Dyspnea  COMPARISON: CT chest 08/17/2022 and multiple prior studies, chest x-ray 02/21/2022, 10/25/2018      Impression    IMPRESSION: Multi lead right subclavian venous pacemaker. Leads are intact and unchanged in position.     There are new, patchy airspace opacities in the right lower lobe suggestive of an area of pneumonitis/early bronchopneumonia. Emphysematous changes are better appreciated on the CT.    No change in the cardiomegaly and mild pulmonary vascular congestion. No signs of failure.      *Note: Due to a large number of results and/or encounters for the requested time period, some results have not been displayed. A complete set of results can be found in Results Review.         RADIOLOGY  XR Chest Port 1 View   Final Result   IMPRESSION: Multi lead right subclavian venous pacemaker. Leads are intact and unchanged in position.       There are new, patchy airspace opacities in the right lower lobe suggestive of an area of pneumonitis/early bronchopneumonia. Emphysematous changes are better appreciated on the CT.      No change in the cardiomegaly and mild pulmonary vascular congestion. No signs of failure.       Echocardiogram Complete    (Results Pending)        EKG:    Rate: 80 bpm  Rhythm: AV paced rhythm  Axis: Normal  Interval: Normal  Conduction: Normal  QRS: Normal  ST: Normal  T-wave: Normal  QT: Not prolonged  Comparison EKG: Bilaterally QRS complexes is changed compared to 24 January 2022  Impression:  No acute ischemic change   I have independently reviewed and interpreted today's EKG, pending Cardiologist read          MEDICATIONS GIVEN IN THE EMERGENCY:  Medications   lidocaine 1 % 0.1-1 mL (has no administration in time range)   lidocaine (LMX4) cream (has no administration in time range)   sodium chloride (PF) 0.9% PF flush 3 mL (has no administration in time range)   sodium chloride (PF) 0.9% PF flush 3 mL (has no administration in time range)   melatonin tablet 1 mg (has no  administration in time range)   bisacodyl (DULCOLAX) suppository 10 mg (has no administration in time range)   ondansetron (ZOFRAN ODT) ODT tab 4 mg (has no administration in time range)     Or   ondansetron (ZOFRAN) injection 4 mg (has no administration in time range)   furosemide (LASIX) injection 80 mg (has no administration in time range)   Continuing ACE inhibitor/ARB/ARNI from home medication list OR ACE inhibitor/ARB/ARNI order already placed during this visit (has no administration in time range)   Continuing beta blocker from home medication list OR beta blocker order already placed during this visit (has no administration in time range)   acetaminophen (TYLENOL) tablet 650 mg (has no administration in time range)   ipratropium - albuterol 0.5 mg/2.5 mg/3 mL (DUONEB) neb solution 3 mL (3 mLs Nebulization $Given 5/18/23 0874)   methylPREDNISolone sodium succinate (solu-MEDROL) injection 125 mg (125 mg Intravenous $Given 5/18/23 0858)       NEW PRESCRIPTIONS STARTED AT TODAY'S ER VISIT  New Prescriptions    No medications on file        I, Roberto Calvillo, am serving as a scribe to document services personally performed by Jesse Langford D.O., based on my observations and the provider's statements to me.  I, Jesse Langford D.O., attest that Roberto Calvillo is acting in a scribe capacity, has observed my performance of the services and has documented them in accordance with my direction.     Jesse Langford D.O.  Emergency Medicine  [facility] EMERGENCY DEPARTMENT  [address] [place] [id number] [phone]  Dept: [phone]     Jesse Langford,   05/18/23 1047

## 2023-05-18 NOTE — PHARMACY-ADMISSION MEDICATION HISTORY
Pharmacist Admission Medication History    Admission medication history is complete. The information provided in this note is only as accurate as the sources available at the time of the update.    Medication reconciliation/reorder completed by provider prior to medication history? No    Information Source(s): Patient, Family member and CareEverywhere/SureScripts via in-person    Pertinent Information: Usual home Warfarin is 5 mg on Monday and Thursday, 2.5 mg all other days. Patient had INR of 3.42 on Tuesday 5/16, was instructed to take 1.25 mg that day and 2.5 mg the rest of this week at that visit.    Changes made to PTA medication list:    Added: None    Deleted: None    Changed: Losartan as 25 mg daily,     Medication Affordability:  Not including over the counter (OTC) medications, was there a time in the past 3 months when you did not take your medications as prescribed because of cost?: No    Allergies reviewed with patient and updates made in EHR: yes    Medication History Completed By: ALISTAIR PIERSON RPH 5/18/2023 12:11 PM    Prior to Admission medications    Medication Sig Last Dose Taking? Auth Provider Long Term End Date   acetaminophen (TYLENOL) 325 MG tablet Take 650 mg by mouth 2 times daily as needed Past Week at prn Yes Reported, Patient     albuterol (PROAIR HFA/PROVENTIL HFA/VENTOLIN HFA) 108 (90 Base) MCG/ACT inhaler Inhale 2 puffs into the lungs every 4 hours as needed for shortness of breath / dyspnea or wheezing 5/18/2023 at am Yes Reported, Patient Yes    Ascorbic Acid (VITAMIN C) 500 MG CAPS Take 1,000 mg by mouth daily 5/17/2023 at am Yes Reported, Patient     atorvastatin (LIPITOR) 40 MG tablet Take 40 mg by mouth every evening 5/17/2023 at pm Yes Reported, Patient Yes    budesonide-formoterol (SYMBICORT) 160-4.5 MCG/ACT Inhaler Inhale 2 puffs into the lungs 2 times daily 5/18/2023 at am Yes Reported, Patient Yes    co-enzyme Q-10 100 MG CAPS capsule Take 2 capsules (200 mg) by mouth  "daily 5/17/2023 at pm Yes Barb Day MD     fish oil-omega-3 fatty acids 1000 MG capsule Take 1 g by mouth 2 times daily 5/17/2023 at pm Yes Reported, Patient     FLUoxetine (PROZAC) 20 MG capsule TAKE 1 CAPSULE BY MOUTH EVERY DAY 5/18/2023 at am Yes Reported, Patient Yes    furosemide (LASIX) 80 MG tablet TAKE 1 TABLET BY MOUTH TWICE DAILY. MAY TAKE ONE-HALF TABLET DAILY AS NEEDED FOR RETAINED FLUID. Strength: 80 mg 5/18/2023 at am Yes Domo Garrett MD Yes    losartan (COZAAR) 25 MG tablet Take 25 mg by mouth daily 5/18/2023 at am Yes Unknown, Entered By History No    MAG64 64 MG TBEC CR tablet TAKE 2 TABLETS BY MOUTH EVERY DAY  Patient taking differently: Take 2 tablets by mouth every evening With food 5/17/2023 at pm Yes Jessica Barrera APRN CNP     multivitamin, therapeutic (THERA-VIT) TABS tablet Take 1 tablet by mouth daily 5/17/2023 at am Yes Reported, Patient     nitroGLYcerin (NITROSTAT) 0.4 MG sublingual tablet One tablet under the tongue every 5 minutes if needed for chest pain. May repeat every 5 minutes for a maximum of 3 doses in 15 minutes\" Unknown at prn Yes Christi Saundesr MD Yes    polyethylene glycol (MIRALAX) 17 GM/Dose powder Take 17 g by mouth daily  5/17/2023 at am Yes Reported, Patient     sotalol (BETAPACE) 160 MG tablet TAKE 1 TABLET(160 MG) BY MOUTH TWICE DAILY 5/18/2023 at am Yes Domo Garrett MD     tiotropium (SPIRIVA) 18 MCG inhaled capsule Inhale 18 mcg into the lungs daily 5/18/2023 at am Yes Reported, Patient Yes    vitamin D2 (ERGOCALCIFEROL) 14155 units (1250 mcg) capsule Take 50,000 Units by mouth twice a week On Sunday and Wednesday 5/17/2023 at am Yes Reported, Patient     warfarin ANTICOAGULANT (COUMADIN) 2.5 MG tablet As of 6/23: 5 mg every Mon, Thu; 2.5 mg all other days 5/17/2023 at 2.5 mg dose on 5/17/23 pm Yes Erica Hansen APRN CNP     OXYGEN-HELIUM IN 4-5 L    Reported, Patient         "

## 2023-05-18 NOTE — CONSULTS
Care Management Initial Consult    General Information  Assessment completed with: Patient, Spouse or significant other, pt and spouse, Neela  Type of CM/SW Visit: Initial Assessment    Primary Care Provider verified and updated as needed: Yes   Readmission within the last 30 days: no previous admission in last 30 days      Reason for Consult: discharge planning  Advance Care Planning: Advance Care Planning Reviewed: verified with patient, no concerns identified          Communication Assessment  Patient's communication style: spoken language (English or Bilingual)             Cognitive  Cognitive/Neuro/Behavioral: WDL                      Living Environment:   People in home: spouse     Current living Arrangements: house      Able to return to prior arrangements: yes       Family/Social Support:  Care provided by: self, spouse/significant other  Provides care for: no one  Marital Status:   Wife          Description of Support System: Supportive, Involved    Support Assessment: Adequate family and caregiver support, Adequate social supports    Current Resources:   Patient receiving home care services: No     Community Resources: None  Equipment currently used at home:    Supplies currently used at home: Oxygen Tubing/Supplies, Hearing Aid Batteries    Employment/Financial:  Employment Status:          Financial Concerns: No concerns identified   Referral to Financial Worker: No       Does the patient's insurance plan have a 3 day qualifying hospital stay waiver?  No    Lifestyle & Psychosocial Needs:  Social Determinants of Health     Tobacco Use: Medium Risk (5/18/2023)    Patient History      Smoking Tobacco Use: Former      Smokeless Tobacco Use: Never      Passive Exposure: Not on file   Alcohol Use: Not on file   Financial Resource Strain: Not on file   Food Insecurity: Not on file   Transportation Needs: Not on file   Physical Activity: Not on file   Stress: Not on file   Social Connections: Not on file    Intimate Partner Violence: Not on file   Depression: Not at risk (2/15/2022)    PHQ-2      PHQ-2 Score: 2   Housing Stability: Not on file       Functional Status:  Prior to admission patient needed assistance:   Dependent ADLs:: Ambulation-walker, Bathing, Dressing, Grooming, Positioning, Transfers, Toileting  Dependent IADLs:: Cleaning, Cooking, Laundry, Meal Preparation, Transportation  Assesssment of Functional Status: Not at baseline with ADL Functioning, Not at baseline with mobility    Mental Health Status:  Mental Health Status: No Current Concerns       Chemical Dependency Status:                Values/Beliefs:  Spiritual, Cultural Beliefs, Restorationism Practices, Values that affect care:                 Additional Information:  Assessed, lives w/spouse and she will transport, no svcs now, uses Home oxygen @ 10 L continuous. CM to follow for discharge needs.      Shukri Aguilar RN

## 2023-05-18 NOTE — PROGRESS NOTES
05/18/23 1420   Appointment Info   Signing Clinician's Name / Credentials (OT) Monique Noah, OTR/L   Living Environment   People in Home spouse   Current Living Arrangements house   Living Environment Comments Pt uses shower chair.   Self-Care   Equipment Currently Used at Home walker, rolling  (4WW)   Activity/Exercise/Self-Care Comment Pt typically independent with ADLs, wears 10LPM via NC at baseline.   Instrumental Activities of Daily Living (IADL)   IADL Comments Wife assists with IADLs.   General Information   Onset of Illness/Injury or Date of Surgery 05/18/23   Referring Physician Fortino Cook DO   Patient/Family Therapy Goal Statement (OT) none stated   Additional Occupational Profile Info/Pertinent History of Current Problem Per chart review, pt is a 78 year old male who has a past medical history significant for HFrEF, status post dual-chamber ICD, CAD, hypertension, persistent atrial fibrillation, chronic hypoxic respiratory failure most recently 10 L PNC, severe COPD who presented to the emergency department due to acute on chronic shortness of breath.  Patient has had progressively worsening dyspnea on exertion along with this paroxysmal nocturnal dyspnea and edema/fluid retention.  No chest pain.  No cough.  No abdominal pain.  No melena, hematochezia or hematemesis.  Patient was briefly seen in the pulmonology clinic by Dr. Payton this morning and upon patient's arrival he looked cyanotic, tachypneic and his SPO2 was in the 70s on 10 L/min.  Ambulance contacted right away and transported patient to the emergency department.  In the emergency department, patient was noted to be on respiratory distress and was therefore placed on BiPAP which has now been weaned down to 6 L nasal cannula.  Admitted for further evaluation and management.   Existing Precautions/Restrictions oxygen therapy device and L/min  (7LPM via oxymask)   Cognitive Status Examination   Orientation Status orientation to  person, place and time  (A&Ox4)   Pain Assessment   Patient Currently in Pain No   Range of Motion Comprehensive   General Range of Motion no range of motion deficits identified   Strength Comprehensive (MMT)   General Manual Muscle Testing (MMT) Assessment no strength deficits identified   Bed Mobility   Bed Mobility supine-sit;sit-supine   Supine-Sit [place] (Bed Mobility) supervision   Sit-Supine [place] (Bed Mobility) supervision   Assistive Device (Bed Mobility) bed rails   Transfers   Transfers sit-stand transfer;toilet transfer   Sit-Stand Transfer   Sit-Stand [place] (Transfers) supervision;contact guard   Assistive Device (Sit-Stand Transfers) walker, front-wheeled   Toilet Transfer   [place] Level (Toilet Transfer) not tested   Toilet Transfer Comments Patient would require commode at this time, desats with minor activity - could not tolerate ambulating to bathroom.   Balance   Balance Comments Staedy with unsupported sitting balance and static standing with FWW.   Clinical Impression   Criteria for Skilled Therapeutic Interventions Met (OT) Yes, treatment indicated   OT Diagnosis Impaired ability to perform ADLs and functional mobility.   Influenced by the following impairments acute CHF   OT Problem List-Impairments impacting ADL problems related to;activity tolerance impaired   Assessment of Occupational Performance 3-5 Performance Deficits   Identified Performance Deficits bed mobility, transfers, toileting, functional mobility   Planned Therapy Interventions (OT) ADL retraining;transfer training;progressive activity/exercise;home program guidelines;risk factor education   Clinical Decision Making Complexity (OT) moderate complexity   Risk & Benefits of therapy have been explained evaluation/treatment results reviewed;care plan/treatment goals reviewed;risks/benefits reviewed;current/potential barriers reviewed;participants voiced agreement with care plan;participants  included;patient   Clinical Impression Comments Pt would benefit from skilled OT services to promote endurance for ADLs and functional mobility.   OT Total Evaluation Time   OT Eval, Moderate Complexity Minutes (80966) 10   OT Goals   Therapy Frequency (OT) 4 times/wk   OT Predicted Duration/Target Date for Goal Attainment 05/25/23   OT Goals Bed Mobility;Transfers;Toilet Transfer/Toileting;Aerobic Activity   OT: Bed Mobility Modified independent;supine to/from sitting   OT: Transfer Modified independent;with assistive device   OT: Toilet Transfer/Toileting Supervision/stand-by assist;toilet transfer;cleaning and garment management;using adaptive equipment  (in bathroom)   OT: Perform aerobic activity with stable cardiovascular response intermittent activity;10 minutes   Interventions   Interventions Quick Adds Therapeutic Activity   Therapeutic Activities   Therapeutic Activity Minutes (15083) 10   Symptoms noted during/after treatment fatigue;shortness of breath;significant change in vital signs   Treatment Detail/Skilled Intervention Pt sat EOB for 4 min, O2 sat noted to drop to 87% briefly after bed mobility, cued for PLB tech and rest break prior to attemping stand. Pt tolerated standing for 3 min with CGA for balance, O2 sat reading 89% while standing, able to take 4x marches with feet while standing. Increased WOB noted as patient stood, cued for seated rest break, once seated O2 sat dropped to low 80s, cued for rest break and PLB seated EOB. O2 sat increased to 83%, cued pt to transition to supine, increased O2 to 8LPM and notified RN. After 4 min supine O2 increased to 92%, slowly decreased supplemental O2 to 7LPM via oxymask and patient maintained sat in 90s. Pt left in bed at end of session with call light in reach and wife present in room.   OT Discharge Planning   OT Plan monitor O2, slowly progress activity tolerance, close transfers, standing tolerance, toileting on commode   OT Discharge  Recommendation (DC Rec) Transitional Care Facility   OT Rationale for DC Rec At this time patient only able to tolerate standing, desat with minor activity. Recommend TCU to promote endurance.   OT Brief overview of current status SBA bed mobility, CGA transfers, desats with activity   Total Session Time   Timed Code Treatment Minutes 10   Total Session Time (sum of timed and untimed services) 20

## 2023-05-18 NOTE — PHARMACY-ANTICOAGULATION SERVICE
Clinical Pharmacy - Warfarin Dosing Consult     Pharmacy has been consulted to manage this patient s warfarin therapy.  Indication: Atrial Fibrillation  Therapy Goal: INR 2-3  Provider/Team: Joey/LIZA  Warfarin Prior to Admission: Yes  Warfarin PTA Regimen: 5mg Monday & Thursday, 2.5 mg all other day (had elevated INR earlier in week and specific instructions given by clinic for the remainder of this week)  Significant drug interactions: ceftriaxone/doxycycline  Dose Comments: INR remains above goal range today, no dose today    INR   Date Value Ref Range Status   05/18/2023 3.49 (H) 0.85 - 1.15 Final     INR HOME MONITORING   Date Value Ref Range Status   05/16/2023 3.4 (H) 2.000 - 3.000 Final       No warfarin today.  Pharmacy will monitor Buck Hernandezstrom daily and order warfarin doses to achieve specified goal.      Please contact pharmacy as soon as possible if the warfarin needs to be held for a procedure or if the warfarin goals change.      Cassie Willson RPH  1:50 PM

## 2023-05-18 NOTE — PLAN OF CARE
Problem: Plan of Care - These are the overarching goals to be used throughout the patient stay.    Goal: Absence of Hospital-Acquired Illness or Injury  Outcome: Progressing  Intervention: Identify and Manage Fall Risk  Recent Flowsheet Documentation  Taken 5/18/2023 1330 by Priscilla Lowery RN  Safety Promotion/Fall Prevention:    activity supervised    assistive device/personal items within reach    clutter free environment maintained    nonskid shoes/slippers when out of bed    patient and family education    room near nurse's station    room organization consistent    safety round/check completed  Intervention: Prevent Skin Injury  Recent Flowsheet Documentation  Taken 5/18/2023 1300 by Priscilla Lowery RN  Body Position: (2 assist to boost in bed) position changed independently  Goal: Optimal Comfort and Wellbeing  Outcome: Progressing  Goal: Readiness for Transition of Care  Outcome: Progressing     Problem: Risk for Delirium  Goal: Optimal Coping  Outcome: Progressing  Goal: Improved Sleep  Outcome: Progressing   Goal Outcome Evaluation:       Writer assumed care of pt at approx 1305. Pt resting in bed, wife at bedside. Ice chips and water given per pt request. Reminded pt of fluid restriction. External cath in use. Echo done at bedside this afternoon. Blood cultures drawn. Therapy worked w/ pt--stood at bedside and took few steps in room; o2 on at 7L per oxymask. O2 sat drops w/ any activity, even talking at times. Takes a few min to brink sat back up to 88-90%. Reminded pt to use call light for needs. Report given to  Constance TONEY.

## 2023-05-18 NOTE — ED NOTES
Bed: JNED-35  Expected date: 5/18/23  Expected time:   Means of arrival:   Comments:  Hold for room 9

## 2023-05-19 NOTE — PLAN OF CARE
Problem: Heart Failure  Goal: Optimal Coping  Outcome: Progressing  Goal: Optimal Functional Ability  Intervention: Optimize Functional Ability  Recent Flowsheet Documentation  Taken 5/19/2023 1000 by Lucia Ellsworth, RN  Activity Management: activity adjusted per tolerance  Goal: Effective Oxygenation and Ventilation  Intervention: Promote Airway Secretion Clearance  Recent Flowsheet Documentation  Taken 5/19/2023 1000 by Lucia Ellsworth, RN  Activity Management: activity adjusted per tolerance   Goal Outcome Evaluation:    Pt was on oxymask during the noc.  Day shift on High flow NC 40% FIO2 55 L.  Sats 87-90 %. Pt very SOB with movement.  Primofit output 800. Fluid restriction 1500.  Pt intake 600cc.  LBM 17th.  Pt refused miralax.  CT of the chest showed pulmonary hypertension. V paced no spikes.

## 2023-05-19 NOTE — PLAN OF CARE
Problem: Heart Failure  Goal: Effective Oxygenation and Ventilation  Outcome: Not Progressing  Intervention: Promote Airway Secretion Clearance  Recent Flowsheet Documentation  Taken 5/19/2023 0000 by Chadd Arroyo RN  Cough And Deep Breathing: done independently per patient  Activity Management: activity adjusted per tolerance  Intervention: Optimize Oxygenation and Ventilation  Recent Flowsheet Documentation  Taken 5/19/2023 0000 by Chadd Arroyo RN  Head of Bed (HOB) Positioning: HOB at 20-30 degrees     Pt's oxygen level increased to 15L via oxymask to maintain saturation at 90-91%, otherwise was at 87-89% on 13L. Pt refused to go on BIPAP overnight. Pt continues on IV Lasix. Lungs diminished. Pt gets very short of breath with little activity, therefore bed weight was done. Primofit used for accurate output.

## 2023-05-19 NOTE — TREATMENT PLAN
RCAT Treatment Plan    Patient Score: 10  Patient Acuity: 4    Clinical Indication for Therapy: COPD    Therapy Ordered: Atrovent and Xopenex QID    Assessment Summary:   Patient has a hx of COPD. Patient currently on HFNC. BS diminished with slight increased aeration post neb. Recommend Atrovent and Xopenex TID.   RT will re-eval in 72 hrs.         RT Wayne  5/19/2023

## 2023-05-19 NOTE — PROGRESS NOTES
05/19/23 1300   Appointment Info   Signing Clinician's Name / Credentials (PT) Solange Muñoz PT   Rehab Comments (PT) pt on HFNC, 40L, 55%   Living Environment   People in Home spouse   Current Living Arrangements house   Home Accessibility stairs within home   Number of Stairs, Within Home, Primary greater than 10 stairs   Stair Railings, Within Home, Primary railings safe and in good condition   Transportation Anticipated family or friend will provide   Living Environment Comments bedroom on 2nd floor   Self-Care   Current Activity Tolerance poor   Activity/Exercise/Self-Care Comment pt Indep ADL's and on 10L O2 at baseline   General Information   Onset of Illness/Injury or Date of Surgery 05/18/23   Referring Physician WOODY Cook   Patient/Family Therapy Goals Statement (PT) get my breathing better   Pertinent History of Current Problem (include personal factors and/or comorbidities that impact the POC) Buck Sinclair is a 78 year old male who has a past medical history significant for HFrEF, status post dual-chamber ICD, CAD, hypertension, persistent atrial fibrillation, chronic hypoxic respiratory failure most recently 10 L PNC, severe COPD who presented to the emergency department due to acute on chronic shortness of breath.  Patient has had progressively worsening dyspnea on exertion along with this paroxysmal nocturnal dyspnea and edema/fluid retention.   Existing Precautions/Restrictions oxygen therapy device and L/min   General Observations pt agreeable to PT   Cognition   Affect/Mental Status (Cognition) WFL   Orientation Status (Cognition) oriented x 4   Follows Commands (Cognition) WFL   Pain Assessment   Patient Currently in Pain No   Range of Motion (ROM)   ROM Comment LE ROM WFL   Strength (Manual Muscle Testing)   Strength Comments Grossly WFL, fatigues quickly, O2 SATS drop with all activities   Bed Mobility   Bed Mobility supine-sit   Supine-Sit [place] (Bed Mobility) supervision;verbal  cues   Assistive Device (Bed Mobility) bed rails   Comment, (Bed Mobility) sitting EOB without assist, SATS drop to 80%, gradually back up to 88% in 3 minutes of rest   Transfers   Transfers sit-stand transfer   Sit-Stand Transfer   Sit-Stand [place] (Transfers) contact guard   Assistive Device (Sit-Stand Transfers) other (see comments)  (none)   Comment, (Sit-Stand Transfer) balance is good but he fatigues easily, trial FWW next session   Clinical Impression   Criteria for Skilled Therapeutic Intervention Yes, treatment indicated   PT Diagnosis (PT) impaired activity tolerance/functional mobility   Influenced by the following impairments O2 needs, fatigue   Functional limitations due to impairments bed mobility, transfers, gait   Clinical Presentation (PT Evaluation Complexity) Evolving/Changing   Clinical Presentation Rationale presents as diagnosed   Clinical Decision Making (Complexity) moderate complexity   Planned Therapy Interventions (PT) bed mobility training;transfer training;gait training;stair training;strengthening   Anticipated Equipment Needs at Discharge (PT) walker, rolling   Risk & Benefits of therapy have been explained care plan/treatment goals reviewed;patient   PT Total Evaluation Time   PT Eval, Moderate Complexity Minutes (95921) 10   Physical Therapy Goals   PT Frequency Daily   PT Predicted Duration/Target Date for Goal Attainment 05/26/23   PT Goals Bed Mobility;Transfers;Gait;Stairs   PT: Bed Mobility Independent;Supine to/from sit   PT: Transfers Modified independent;Sit to/from stand;Bed to/from chair;Assistive device   PT: Gait Supervision/stand-by assist;Rolling walker;50 feet  (keeping O2 SATS at or above 88%)   PT: Stairs Minimal assist;6 stairs  (CGA, with rails)   Interventions   Interventions Quick Adds Therapeutic Activity   Therapeutic Activity   Therapeutic Activities: dynamic activities to improve functional performance Minutes (02935) 15   Symptoms Noted During/After  Treatment Significant change in vital signs  (O2 sats drop to low 80's, improve with rest in about 3-4 min.)   Treatment Detail/Skilled Intervention standing balance without AD, took several small steps to chair with CGA.  Seated LE ex with direction/demo, 5 reps at a time with 2 min rest breaks btwn ex   PT Discharge Planning   PT Plan bed mobility, sitting tolerance, standing wiwth FWW and transfer to bedside chair.  Progress to gt with FWW as able, monitor O2 SATS throughout   PT Discharge Recommendation (DC Rec) home with assist;home with home care physical therapy   PT Rationale for DC Rec home pending O2 needs   PT Brief overview of current status sist/stand/transfer to bedside chair with CGA of 1, O2 SATS drop to low 80's with activity   Total Session Time   Timed Code Treatment Minutes 15   Total Session Time (sum of timed and untimed services) 25

## 2023-05-19 NOTE — PROGRESS NOTES
RT PROGRESS NOTE  CURRENT HIGH FLOW SETTINGS:  LITER FLOW: 40 LPM        FIO2:   55%  PATIENT PARAMETERS:  SAT: 94%  BS: diminished    Respiratory Medications: Atrovent/ Xopenex QID     NOTE / SHIFT SUMMARY:       Patient found on 15 LPM Oxymask this AM with saturations in low 90s. Patient placed on HFNC 40L 55%. Patient tolerating well. BIPAP on standby.        Rl Crowley, RT

## 2023-05-19 NOTE — PROGRESS NOTES
HEART CARE NOTE          Assessment/Recommendations     1. RV dysfunction/HFimpEF  Assessment / Plan  Profound fluid overload and resultant respiratory compromise requiring BIPAP - agree with current diuretic regimen; no changes to regimen at this time - diuresing well     2. CAD  Assessment / Plan  S/p CANDELARIA x3; denies chest pain or anginal equivalent   Continue ASA unless otherwise contraindicated     3. Atrial fibrillation  Assessment / Plan  S/p ablation x2; s/p ICD  Currently on sotalol; Coumadin management per pharmacy     4. CKD   Assessment / Plan  Renal function appears to be at baseline; continue to monitor closely with diuresis     5. Moderate-severe Pulmonary HTN  Assessment / Plan  Primarily WHO group 3 - follows with Dr. Payton in pulmonary clinic and Dr. Morales    History of Present Illness/Subjective      Mr. Buck Sinclair is a 78 year old male with a PMHx significant for (per Dr. Garrett's note)  HFrEF/RV dysfunction due to nonischemic cardiomyopathy (out of proportion to CAD) with LVEF most recently noted to be 50%, persistent AF s/p multiple electrical cardioversions and extensive catheter ablations x2 in 2011, CAD s/p CANDELARIA x3 to the [id number] LAD, COPD on home O2, Medtronic dual-chamber permanent pacemaker placement in 1999 replaced with a Medtronic dual-chamber implantable cardiac defibrillator on 6/11/2014, PHTN, HTN, and HLD presenting in acute hypoxic respiratory failure in the setting of ADHF     Today, Mr. Bone endorses significant improvement in his presenting symptoms; Management plan as detailed above     ECG: Personally reviewed. Paced rhythm     ECHO (personnaly Reviewed): repeat study pending   Left ventricular size, wall motion and function are normal. The ejection  fraction is 55-60%.  Global right ventricular function is mildly to moderately reduced.  Moderate tricuspid insufficiency is present.  RVSP is estimated 73mmHg.Moderate pulmonary hypertension is  "present.  IVC diameter >2.1 cm collapsing <50% with sniff suggests a high RA pressure  estimated at 15 mmHg or greater.     When compared with prior study of 12/08/2021, filling pressures/PASP are  higher now.          Physical Examination Review of Systems   /54 (BP Location: Right arm)   Pulse 79   Temp 97.4  F (36.3  C) (Oral)   Resp 22   Ht 1.905 m (6' 3\")   Wt 123.5 kg (272 lb 4.3 oz)   SpO2 90%   BMI 34.03 kg/m    Body mass index is 34.03 kg/m .  Wt Readings from Last 3 Encounters:   05/19/23 123.5 kg (272 lb 4.3 oz)   11/16/22 123.3 kg (271 lb 14.4 oz)   11/08/22 118.8 kg (262 lb)     General Appearance:   no distress, normal body habitus   ENT/Mouth: membranes moist, no oral lesions or bleeding gums.      EYES:  no scleral icterus, normal conjunctivae   Neck: no carotid bruits or thyromegaly   Chest/Lungs:   lungs are clear to auscultation, no rales or wheezing, equal chest wall expansion    Cardiovascular:   Regular. Normal first and second heart sounds with no murmurs, rubs, or gallops; the carotid, radial and posterior tibial pulses are intact, + JVD and LE edema bilaterally    Abdomen:  no organomegaly, masses, bruits, or tenderness; bowel sounds are present   Extremities: no cyanosis or clubbing   Skin: no xanthelasma, warm.    Neurologic: alert and oriented x3, calm     Psychiatric: alert and oriented x3, calm     A complete 10 systems ROS was reviewed  And is negative except what is listed in the HPI.          Medical History  Surgical History Family History Social History   Past Medical History:   Diagnosis Date     Anemia      Asthma without status asthmaticus 5/5/2021     BPH (benign prostatic hyperplasia)      Cardiomyopathy (H)      COPD, group B, by GOLD 2017 classification (H)      Coronary artery disease due to calcified coronary lesion      Dyslipidemia, goal LDL below 70      Essential hypertension      History of transfusion      Persistent atrial fibrillation (H)      " Pneumonia of left lower lobe due to infectious organism 10/4/2017     Skin cancer of trunk      Status post catheter ablation of atrial fibrillation 6/7/2017    PVI 4-2011 (Cryo/PVI + roof line + CTI line) Re-do PVI 7-2011 (RFA/PVI + CFE + VIDYA + confirmed CTI line)     Ventricular tachycardia (H)     Past Surgical History:   Procedure Laterality Date     CARDIAC DEFIBRILLATOR PLACEMENT       CARDIOVERSION  07/11/2018    x20, last 2/12/15, 10/2015, 11/18/16, 6/16/17 by Lauren Foster CNP     CARDIOVERSION  07/11/2018     CARDIOVERSION  11/19/2021     COLONOSCOPY N/A 4/28/2017    Procedure: COLONOSCOPY with 2 ascending polyps and 1 transverse polyp;  Surgeon: Jose Whittington MD;  Location: [facility];  Service:      CV CORONARY ANGIOGRAM N/A 9/20/2017    Procedure: Coronary Angiogram;  Surgeon: Sergio Cervantes MD;  Location: [facility] Cath Lab;  Service:      CV CORONARY ANGIOGRAM N/A 1/24/2022    Procedure: Coronary Angiogram;  Surgeon: Christi Saunders MD;  Location: [facility] CATH LAB CV     CV LEFT HEART CATH N/A 1/24/2022    Procedure: Left Heart Cath;  Surgeon: Christi Saunders MD;  Location: [facility] CATH LAB CV     CV RIGHT AND LEFT HEART CATH N/A 1/24/2022    Procedure: Right and Left Heart Catherization;  Surgeon: Christi aSunders MD;  Location: [facility] CV     EP ICD GENERATOR REPLACEMENT DUAL N/A 10/28/2022    Procedure: Implantable Cardioverter Defibrillator Generator Replacement Dual;  Surgeon: Elo Collins MD;  Location: [facility] CATH LAB CV     EP ICD INSERT       FRACTURE SURGERY Left     wrist     INGUINAL HERNIA REPAIR Left 1967    while in the [employer] in [place] after 13 month in [place]     INSERT / REPLACE / REMOVE PACEMAKER       IR MISCELLANEOUS PROCEDURE  4/30/2014     OTHER SURGICAL HISTORY      left hand surgery---tendon repair     [place] ABLATE HEART DYSRHYTHM FOCUS  04/2011    Catheter Ablation Atrial Fibrillation PVI Apr 2011 (Cryo+RF-PVI + roof line + CTI line)     [place]  ABLATE HEART DYSRHYTHM FOCUS  2011    Re-do PVI 2011 (RFA-PVI + CFE + VIDYA + confirmation of CTI line)     TOTAL SHOULDER REPLACEMENT Right 2016    Dr. Abernathy of [facility]     WRIST SURGERY Left      ZZC MYERS W/O FACETEC FORAMOT/DSKC  VRT SEG, CERVICAL      Laminectomy Lumbar;  Recorded: 2012;    no family history of premature coronary artery disease Social History     Socioeconomic History     Marital status:      Spouse name: Not on file     Number of children: Not on file     Years of education: Not on file     Highest education level: Not on file   Occupational History     Not on file   Tobacco Use     Smoking status: Former     Packs/day: 1.00     Years: 4.00     Pack years: 4.00     Types: Cigarettes     Quit date: 1968     Years since quittin.4     Smokeless tobacco: Never   Vaping Use     Vaping status: Never Used   Substance and Sexual Activity     Alcohol use: Yes     Alcohol/week: 2.0 standard drinks of alcohol     Comment: Alcoholic Drinks/day: 1 beer per week     Drug use: No     Sexual activity: Yes     Partners: Female     Birth control/protection: Post-menopausal   Other Topics Concern     Parent/sibling w/ CABG, MI or angioplasty before 65F 55M? Not Asked   Social History Narrative    Preloaded 2013     Social Determinants of Health     Financial Resource Strain: Not on file   Food Insecurity: Not on file   Transportation Needs: Not on file   Physical Activity: Not on file   Stress: Not on file   Social Connections: Not on file   Intimate Partner Violence: Not on file   Housing Stability: Not on file           Lab Results    Chemistry/lipid CBC Cardiac Enzymes/BNP/TSH/INR   Lab Results   Component Value Date    CHOL 109 10/06/2022    HDL 58 10/06/2022    TRIG 39 10/06/2022    BUN 42.7 (H) 2023     2023    CO2 25 2023    Lab Results   Component Value Date    WBC 7.1 2023    HGB 10.4 (L) 2023    HCT 34.3 (L)  05/19/2023     (H) 05/19/2023     (L) 05/19/2023    Lab Results   Component Value Date     (H) 12/01/2020    TSH 2.38 05/18/2023    INR 3.13 (H) 05/19/2023     No results found for: CKTOTAL, CKMB, TROPONINI       Weight:    Wt Readings from Last 3 Encounters:   05/19/23 123.5 kg (272 lb 4.3 oz)   11/16/22 123.3 kg (271 lb 14.4 oz)   11/08/22 118.8 kg (262 lb)       Allergies  Allergies   Allergen Reactions     Adhesive Tape Other (See Comments)     ADHESIVE TAPE; SKIN IRRITATION; Skin pulled off with foam tape       Amiodarone      ADVERSE REACTION.  Sunlight sensitivity.     Lisinopril          Surgical History  Past Surgical History:   Procedure Laterality Date     CARDIAC DEFIBRILLATOR PLACEMENT       CARDIOVERSION  07/11/2018    x20, last 2/12/15, 10/2015, 11/18/16, 6/16/17 by Lauren Foster CNP     CARDIOVERSION  07/11/2018     CARDIOVERSION  11/19/2021     COLONOSCOPY N/A 4/28/2017    Procedure: COLONOSCOPY with 2 ascending polyps and 1 transverse polyp;  Surgeon: Jose Whittington MD;  Location: [facility] GI;  Service:      CV CORONARY ANGIOGRAM N/A 9/20/2017    Procedure: Coronary Angiogram;  Surgeon: Sergio Cervantes MD;  Location: [facility] Cath Lab;  Service:      CV CORONARY ANGIOGRAM N/A 1/24/2022    Procedure: Coronary Angiogram;  Surgeon: Christi Saunders MD;  Location: [facility] LAB CV     CV LEFT HEART CATH N/A 1/24/2022    Procedure: Left Heart Cath;  Surgeon: Christi Saunders MD;  Location: [facility] CATH LAB CV     CV RIGHT AND LEFT HEART CATH N/A 1/24/2022    Procedure: Right and Left Heart Catherization;  Surgeon: Christi Saunders MD;  Location: [facility] CV     EP ICD GENERATOR REPLACEMENT DUAL N/A 10/28/2022    Procedure: Implantable Cardioverter Defibrillator Generator Replacement Dual;  Surgeon: Elo Collins MD;  Location: [facility] CV     EP ICD INSERT       FRACTURE SURGERY Left     wrist     INGUINAL HERNIA REPAIR Left 1967    while in  the [employer] in [place] after 13 month in [place]     INSERT / REPLACE / REMOVE PACEMAKER       IR MISCELLANEOUS PROCEDURE  4/30/2014     OTHER SURGICAL HISTORY      left hand surgery---tendon repair     [place] ABLATE HEART DYSRHYTHM FOCUS  04/2011    Catheter Ablation Atrial Fibrillation PVI Apr 2011 (Cryo+RF-PVI + roof line + CTI line)     [place] ABLATE HEART DYSRHYTHM FOCUS  07/2011    Re-do PVI Jul 2011 (RFA-PVI + CFE + VIDYA + confirmation of CTI line)     TOTAL SHOULDER REPLACEMENT Right 03/03/2016    Dr. Abernathy of [facility]     WRIST SURGERY Left      ZZC MYERS W/O FACETEC FORAMOT/DSKC 1/2 VRT SEG, CERVICAL      Laminectomy Lumbar;  Recorded: 03/09/2012;       Social History  Tobacco:   History   Smoking Status     Former     Packs/day: 1.00     Years: 4.00     Types: Cigarettes     Quit date: 1/1/1968   Smokeless Tobacco     Never    Alcohol:   Social History    Substance and Sexual Activity      Alcohol use: Yes        Alcohol/week: 2.0 standard drinks of alcohol        Comment: Alcoholic Drinks/day: 1 beer per week   Illicit Drugs:   History   Drug Use No       Family History  Family History   Problem Relation Age of Onset     Cancer Mother         leukemia     Cancer Father         bladder     Cancer Sister         breast with lung met.     Aneurysm Sister      CABG Brother      CABG Brother      Valvular heart disease Brother         valve replacement          Deon Iglesias MD on 5/19/2023      cc: Vivek Guerrero

## 2023-05-19 NOTE — PROGRESS NOTES
Care Management Follow Up    Length of Stay (days): 1    Expected Discharge Date: 05/20/2023     Concerns to be Addressed:  15 L O2  Patient plan of care discussed at interdisciplinary rounds: Yes    Anticipated Discharge Disposition:  Home with assist     Anticipated Discharge Services:   Home with assist  Anticipated Discharge DME:  TBD    Patient/family educated on Medicare website which has current facility and service quality ratings:  NA  Education Provided on the Discharge Plan:  Per team  Patient/Family in Agreement with the Plan:  yes    Referrals Placed by CM/SW:  HECTOR  Private pay costs discussed: NA    Additional Information:  Pt admitted with acute on chronic congestive heart failure as well as COPD. He came in on 10L or O2 per NC and is currently on 15L per [employer].Pt is receiving education foer the C.O.R.E. program for heart failure    Pt plans to return home on discharge.  At this time pt cannot return home on this level of O2, and would not be able to got to any facility unless he was at 10L or less.  CM will continue to follow patient and make any referrals as needed  Chikis Holley RN

## 2023-05-19 NOTE — PROGRESS NOTES
RT was called to put patient on Bipap. Currently on 10L oxymask  Patient was having desats. Patient declined and stated not wanting to go on Bipap at this time. RN notified.

## 2023-05-19 NOTE — PROGRESS NOTES
[facility] Progress Note - Hospitalist Service    Date of Admission:  5/18/2023    Assessment & Plan   HFrEF with ADHF: deemed nonischemic dilated cardiomyopathy.  Most recent TTE (9/13/2022) LVEF 55 to 60%, moderate TI, global RV function mild-moderately reduced, RSVP approximately 73 mmHg indicative of moderate pulmonary hypertension, estimated RA pressure 15 mmHg or greater.  -TTE (5/18/23) showed LVEF 55-60%, RV mod dilated, mod decreased RVS function, mod LAD, severe RAD, 2-3+ TR, severe PHTN, ~RAP >15mmHg  -IV Lasix 80mg TID with hold parameters (Normally on 80 mg twice daily)  -Cardiology consult.  Assistance appreciated.  - Hold losartan while diuresing  -strict I/O, daily weights     Severe COPD; moderate-to-severe pulmonary hypertension; acute on chronic hypoxic respiratory failure: subjectively improved howevere on 15L HFNC this morning.  -Current dyspnea seems more heart failure related from fluid overload/pulmonary edema and less likely COPD exacerbation.    - hold inhalers  -trial xopenex/atrovent neb q6  -solumedrol 40mg IV q12  -Wean oxygen as able.  Goal SPO2 88-92%.  Avoid hyperoxia.  -Empiric IV zosyn in place of ctx/azithro given elevated pseudomonas risk     Possible CAP: Chest x-ray in emergency department showed new patchy airspace opacities in right lower lobe suggestive of area of pneumonitis/early proximal pneumonia.  Emphysematous changes. Cardiomegaly and mild pulmonary vascular congestion.    -Blood culture x2 ngtd, procalcitonin 0.01, respiratory panel PCR negative  -Empiric IV zosyn in place of ctx/azithro given elevated pseudomonas risk     Coronary artery disease: S/P CANDELARIA x3.  Coronary angiogram 1/24/2022 showed mild CAD with patent LAD stents in the proximal and mid LAD, left circumflex third obtuse marginal branch 30% stenosis, mid RCA lesion 20% stenosis.  LVEDP and PCWP normal.  PAP 66/24 mmHg with a mean pressure of 37 mmHg.  Shikha and TD  "cardiac outputs both 5.7 L/min  -On warfarin, sotalol, statin  - No anginal complaints  - Troponin T-HS 31 ->30     Medtronic dual-chamber implantable cardiac defibrillator placement on 6/11/2014 and generator change on 10/28/2022 for primary prevention of sudden cardiac death.  -device interrogation on 5/16/23 reviewed.  Defer to cardiology if any changes necessary.     History of Medtronic dual-chamber permanent pacemaker placement in 1999 with generator replacement in 2005 and device removal in 2014.     Persistent atrial fibrillation: per Dr. Garrett's last note, status post multiple electrical cardioversions, most recently on 11/19/2021. He underwent complex catheter ablation x2 in 2011. Overall fairly low burden of atrial arrhythmia based on his device interrogations. [id number] score is at least 5 (congestive heart failure, hypertension, age 75 or greater, coronary artery disease).  -warfarin, pharmacy to dose  - INR 3.13     CKD stage III: Baseline creatinine 1.5-1.6  -stable  -monitor closely     HTN:  -Hold losartan while on IV Lasix  -Sotalol     HLD:  -lipitor     Chronic macrocytic anemia, acute thrombocytopenia: TSH normal.  monitor          Diet: Fluid restriction 1500 ML FLUID  Advance Diet as Tolerated: Regular Diet Adult; 2 gm NA Diet    DVT Prophylaxis: warfarin  Sawyer Catheter: Not present  Lines: None     Cardiac Monitoring: ACTIVE order. Indication: Acute decompensated heart failure (48 hours)  Code Status: Full Code      Clinically Significant Risk Factors              # Hypoalbuminemia: Lowest albumin = 3.3 g/dL at 5/19/2023  4:46 AM, will monitor as appropriate  # Coagulation Defect: INR = 3.13 (Ref range: 0.85 - 1.15) and/or PTT = N/A, will monitor for bleeding    # Hypertension: Noted on problem list        # Obesity: Estimated body mass index is 34.03 kg/m  as calculated from the following:    Height as of this encounter: 1.905 m (6' 3\").    Weight as of this encounter: 123.5 kg (272 lb " 4.3 oz)., PRESENT ON ADMISSION          Disposition Plan     Expected Discharge Date: 05/20/2023                  Fortino Cook DO, DO  Hospitalist Service  [facility]  Securely message with [employer] (more info)  Text page via [employer] Paging/Directory   ______________________________________________________________________    Interval History   Events overnight noted    Physical Exam   Vital Signs: Temp: 98  F (36.7  C) Temp src: Oral BP: 110/70 Pulse: 79   Resp: 20 SpO2: 93 % O2 Device: High Flow Nasal Cannula (HFNC) Oxygen Delivery: 40 LPM  Weight: 272 lbs 4.29 oz    General appearance - alert, and in no distress  Eyes - sclera anicteric  Lungs - diffusely decreased, no active wheezing, currently not labored  Heart - irreg, irreg, + JVD,. + peripheral edema improving.  Abdomen - soft, nontender, BS+  Neurological - alert, oriented x3  Skin - no c/c/p     Lab/imaging reviewed

## 2023-05-19 NOTE — PROGRESS NOTES
[facility] was introduced to the patient today including our role in the home management of their heart failure.  Heart Failure Education Materials were shared today with the patient.  Introduction to the expectations including daily weight tracking using the Daily Weight Log  Home B/P monitoring as appropriate( to bring in home monitoring cuff to the clinic appointment for verification)  Low Sodium diet of 2000mg or less daily, review of label reading, aim for fresh and avoid processed items  Daily Fluid restriction of 2000ml  considerations  Exercise importance as able explained  Monitor and report s/s of heart failure. How to report these changes.  Follow up appointments and testing expectations.    [facility] [place]  [facility]   [phone] Nurse [facility]   [address] [place] [address]    Pt's spouse at bedside. Pt not nearly as engaged in conversation as spouse is. We went through some things they eat on a regular basis and identified areas of improvement such as not eating so much fruit and making sure to account for that from total liquids allowed per day. He uses a 30oz yeti water bottle per spouse report. Informed that's a great method of tracking. Continue to do that and subtract from daily total. She states this is not new to them and she used to cook with no salt back in the day but over time became more relaxed about it. States she uses salt sometimes. Discussed the need to eliminate additional salt completely and to use no salt seasonings. We reviewed the sodium and fluid restrictions, what to count as fluid, label reading, avoiding takeout/fast food, and how noncompliance leads to fluid retention which leads to wt gain/symptoms. Reviewed when to call with worsening wt gain and/or symptoms. Seems like they tried [employer] or something similar before, but they cannot recall. Encouraged to give open arms a try for now and if it is not something  they like or will utilize long term, informed they can always cancel.      Kristie WATTERS RN  BSN

## 2023-05-19 NOTE — PROGRESS NOTES
"CLINICAL NUTRITION SERVICES -  Education & ASSESSMENT NOTE     Nutrition Prescription    RECOMMENDATIONS FOR MDs/PROVIDERS TO ORDER:    Malnutrition Status:    Moderate on acute.     Recommendations already ordered by Registered Dietitian (RD):  Ensure enlive chocolate once/day at Lunch.     Future/Additional Recommendations:  Monitor po intake, weight, supplement preferences.      REASON FOR ASSESSMENT  Buck Sinclair is a/an 78 year old male assessed by the dietitian for Provider Order - Nutrition Education - 2 gm Na education.     NUTRITION HISTORY  Met with pt and his wife at bedside this afternoon. Pt wife reports doing most of the grocery shopping and cooking at home. Pt and his wife eat 2-3 meals/day with a light breakfast and lunch and larger supper. Pt wife is familiar with label reading and choosing low/no salt food options and Mrs Delgado. Pt wife reports very minimal eating out or convenience foods- most food is made by pt wife.     CURRENT NUTRITION ORDERS  Diet: 2 gm Na, fluid restriction 1500 mL fluid   Intake/Tolerance: Poor po intakes, pt had 100% breakfast today but no lunch, and decreased intake pta    ANTHROPOMETRICS  Height: 190.5 cm (6' 3\")  Most Recent Weight: 123.5 kg (272 lb 4.3 oz)    IBW: 196 lbs, 89.1 kg   BMI: Obesity Grade I BMI 30-34.9  Weight History:   Vitals:    05/18/23 0839 05/18/23 1815 05/19/23 0412   Weight: 130.6 kg (288 lb) 122 kg (268 lb 15.4 oz) 123.5 kg (272 lb 4.3 oz)     Wt Readings from Last 10 Encounters:   05/19/23 123.5 kg (272 lb 4.3 oz)   11/16/22 123.3 kg (271 lb 14.4 oz)   11/08/22 118.8 kg (262 lb)   10/28/22 118.8 kg (262 lb)   05/17/22 126.6 kg (279 lb)   05/06/22 127.6 kg (281 lb 4.8 oz)   03/15/22 123.7 kg (272 lb 9.6 oz)   03/01/22 122.2 kg (269 lb 6.4 oz)   02/18/22 121.1 kg (267 lb)   02/15/22 123.1 kg (271 lb 6.4 oz)     Dosing Weight: 97.7 kg DBW    ASSESSED NUTRITION NEEDS  Estimated Energy Needs: 6272-9773 kcals/day (20 - 25 kcals/kg)  Justification: " Obese  Estimated Protein Needs: 117-147 grams protein/day (1.2 - 1.5 grams of pro/kg)  Justification: Maintenance  Estimated Fluid Needs: ~1800 mL/day (1 mL/kcal)   Justification: Maintenance    PHYSICAL FINDINGS  See malnutrition section below.  +2 Mild edema BLE  Last BM documented 5/17      MALNUTRITION:  % Weight Loss:  None noted  % Intake:  <75% for >7 days (moderate malnutrition)  Subcutaneous Fat Loss:  Upper arm region mild depletion  Muscle Loss:  Clavicle bone region mild depletion and Acromion bone region mild depletion  Fluid Retention:  Mild +2 edema BLE including legs, ankles, and feet.     Malnutrition Diagnosis: Moderate malnutrition  In Context of:  Acute illness or injury    NUTRITION DIAGNOSIS  Inadequate oral intake related to acute illness as evidenced by pt wife report of pt having limited energy and low po intakes and pt not meeting nutritional needs.     Food- and nutrition-related knowledge deficit R/t 2 gm Na diet as evidence by need for nutrition education.     INTERVENTIONS  Implementation  Nutrition Education (Content):   A)  Provided handouts- Low Sodium Nutrition Therapy, Sodium-Free Flavoring Tips, Heart-Healthy Label Reading Tips.,    B)  Discussed handouts with pt and his wife- topics covered included diet tips for the food groups, canned foods, seasonings, label reading, importance of protein.   Nutrition Education (Application):   A)  Discussed current eating habits and recommended alternative food choices.   Anticipate good compliance  Diet Education - refer to Education Flowsheet  Medical food supplement therapy- Ensure enlive chocolate once/day     Goals  Patient to consume % of nutritionally adequate meals three times per day, or the equivalent with supplements/snacks.  Patient will verbalize understanding of diet   All of the above goals met during the education session     Monitoring/Evaluation  Provided RD contact information for future questions  Recommended  Out-Patient Nutrition Referral, if further diet instructions are needed.   Progress toward goals will be monitored and evaluated per protocol.

## 2023-05-20 NOTE — PLAN OF CARE
Problem: Heart Failure  Goal: Optimal Coping  Outcome: Progressing  Goal: Optimal Functional Ability  Intervention: Optimize Functional Ability  Recent Flowsheet Documentation  Taken 5/19/2023 1940 by Brie Boucher RN  Activity Management: activity adjusted per tolerance  Taken 5/19/2023 1549 by Brie Boucher RN  Activity Management: activity adjusted per tolerance  Goal: Fluid and Electrolyte Balance  Outcome: Progressing  Goal: Effective Oxygenation and Ventilation  Outcome: Progressing  Goal: Effective Breathing Pattern During Sleep  Intervention: Monitor and Manage Obstructive Sleep Apnea  R  Goal Outcome Evaluation:    A&O x4. No c/o pain or discomfort. The wife was here most of the shift, supportive and attentive to pt. He is on a HF NC - FiO2 55% 40L O2. With any activity pt's O2 drops into the 70's and takes awhile to rebound (5-10 minutes). RT was notified of this and came to see the pt- no changes to the HF NC. The pt says he feels a little more SOB when it happens, but he takes a few deep breaths and it goes back to baseline.     Tele- V-paced.

## 2023-05-20 NOTE — PROGRESS NOTES
Respiratory Care    Pt continues on HFNC 40 LPM 50% SpO2 88-92. Nebs done. LS diminished. WOB appears stable at this time. Will continue to follow.      Wesley Vanessa, RT

## 2023-05-20 NOTE — PHARMACY-VANCOMYCIN DOSING SERVICE
Pharmacy Vancomycin Initial Note  Date of Service May 19, 2023  Patient's  1945  78 year old, male  Indication: [facility]ated Pneumonia  Current estimated CrCl = Estimated Creatinine Clearance: 53.5 mL/min (A) (based on SCr of 1.61 mg/dL (H)).  Creatinine for last 3 days  2023:  8:55 AM Creatinine 1.64 mg/dL  2023:  4:46 AM Creatinine 1.61 mg/dL    Nephrotoxins and other renal medications (From now, onward)    Start     Dose/Rate Route Frequency Ordered Stop    23 1427  piperacillin-tazobactam (ZOSYN) 3.375 g vial to attach to  mL bag        Note to Pharmacy: Extended infusion dosing to start 6 hours after initial infusion.   [facility]    3.375 g  over 240 Minutes Intravenous EVERY 8 HOURS 23 0828      23 1100  furosemide (LASIX) injection 80 mg         80 mg  over 1-3 Minutes Intravenous EVERY 8 HOURS 23 1031          Contrast Orders - past 72 hours (72h ago, onward)    Start     Dose/Rate Route Frequency Stop    23 1500  perflutren lipid microsphere (DEFINITY) injection SUSP 3 mL         3 mL Intravenous ONCE 23 1432      [employer] Prediction of Planned Initial Vancomycin Regimen  Loading dose: 2000 mg at 23:30 2023.  Regimen: 1000 mg IV every 18 hours.  Start time: 23:01 on 2023  Exposure target: AUC24 (range)400-600 mg/L.hr   AUC24,ss: 467 mg/L.hr  Probability of AUC24 > 400: 67 %  Ctrough,ss: 15.9 mg/L  Probability of Ctrough,ss > 20: 28 %  Probability of nephrotoxicity (Lodise AMIRA ): 11 %       Plan:  1. Start vancomycin  2000 mg IV x 1 then 1000mg q18h.   2. Vancomycin monitoring method: AUC  3. Vancomycin therapeutic monitoring goal: 400-600 mg*h/L  4. Pharmacy will check vancomycin levels as appropriate in 1-3 Days.    5. Recommend checking trough level with am labs on   6. Serum creatinine levels will be ordered daily for the first week of therapy and at least twice weekly for  subsequent weeks.      Fay Lafleur, RPH

## 2023-05-20 NOTE — PROGRESS NOTES
Care Management Follow Up    Length of Stay (days): 2    Expected Discharge Date: 05/21/2023     Concerns to be Addressed:  HFNC  Patient plan of care discussed at interdisciplinary rounds: Yes    Anticipated Discharge Disposition:  Home with home care     Anticipated Discharge Services:   Home with home care  Anticipated Discharge DME:  TBD    Patient/family educated on Medicare website which has current facility and service quality ratings:  NA  Education Provided on the Discharge Plan:  Per team  Patient/Family in Agreement with the Plan:  yes    Referrals Placed by CM/SW:  Home care  Private pay costs discussed: DEMIAN    Additional Information:  Pt is a 78 year old male who was admitted with SOB and found to have acute on chronic congestive heart failure.  He also has COPD and was using 10L of O2 at home. He is currently on  40L/65% HFNC. He has received education from the C.O.R.E. program for heart failure    Pt lives with his spouse I a single family home.  Pt needs assistance with most I/ADLS, His wife assists with these.  His wife also provides transportation and can transport him at discharge.    Pt and OT have assessed the pt .  Pt has recommended that pt go home with home care PT.  Referral will be made to [facility] for SN, PT and OT and kosta cantrell to help with bathing      CM will continue to follow and make any other appropriate referrals.     Chikis Holley RN

## 2023-05-20 NOTE — PLAN OF CARE
Goal Outcome Evaluation:                    Heart Failure Care Map  GOALS TO BE MET BEFORE DISCHARGE:    1. Decrease congestion and/or edema with diuretic therapy to achieve near optimal volume status.     Dyspnea improved: No, further care required to meet this goal. Please explain patient continues to be short of breath and have oxygen saturation decrease with minimal activity   Edema improved: No, further care required to meet this goal. Please explain leg, ankle, and feet edema +2        Last 24 hour I/O:   Intake/Output Summary (Last 24 hours) at 5/20/2023 0509  Last data filed at 5/20/2023 0350  Gross per 24 hour   Intake 1343 ml   Output 2750 ml   Net -1407 ml           Net I/O and Weights since admission:   04/20 0700 - 05/20 0659  In: 2088 [P.O.:2085; I.V.:3]  Out: 4900 [Urine:4900]  Net: -2812     Vitals:    05/18/23 0839 05/18/23 1815 05/19/23 0412 05/20/23 0348   Weight: 130.6 kg (288 lb) 122 kg (268 lb 15.4 oz) 123.5 kg (272 lb 4.3 oz) 121.9 kg (268 lb 11.9 oz)       2.  O2 sats > 90% on room air, or at prior home O2 therapy level.      Able to wean O2 this shift to keep sats above 90%?: No, further care required to meet this goal. Please explain Hi iram nasal canula in place.  Declined to use Bipap.   Does patient use Home O2? Yes-           Current oxygenation status:   SpO2: 90 %     O2 Device: High Flow Nasal Cannula (HFNC), Oxygen Delivery: 40 LPM    3.  Tolerates ambulation and mobility near baseline.     Ambulation: No, further care required to meet this goal. Please explain patient rested in bed over night.   Times patient ambulated with staff this shift: 0    Please review the Heart Failure Care Map for additional HF goal outcomes.    Ann Carrion RN  5/20/2023      Patient is alert and oriented.  Nasal swab for MRSA, MSSA done.  Staff attempted to get ABGs, but were unable, MD updated and order for VBGs competed.  IV antibiotics given, doxycycline and vancomycin initiated.  External  catheter in place to accurately measure output. Patient desaturated when he so much as sits up in bed, but he consistently denies feeling short of breath.  Declined to use BIPAP.  Hi iram nasal cannula adjusted by RT.  Slept between cares.  Will continue to monitor.

## 2023-05-20 NOTE — PROGRESS NOTES
[facility]    Medicine Progress Note - Hospitalist Service    Date of Admission:  5/18/2023    Assessment & Plan   HFrEF with ADHF: deemed nonischemic dilated cardiomyopathy.  Most recent TTE (9/13/2022) LVEF 55 to 60%, moderate TI, global RV function mild-moderately reduced, RSVP approximately 73 mmHg indicative of moderate pulmonary hypertension, estimated RA pressure 15 mmHg or greater.  -TTE (5/18/23) showed LVEF 55-60%, RV mod dilated, mod decreased RVS function, mod LAD, severe RAD, 2-3+ TR, severe PHTN, ~RAP >15mmHg  -Remains hypervolemic. Net negative 2.6L.  -IV Lasix 80mg TID with hold parameters (Normally on 80 mg twice daily)  -Cardiology consult.  Assistance appreciated.  - Hold losartan while diuresing  -strict I/O, daily weights     Severe COPD; moderate-to-severe pulmonary hypertension; acute on chronic hypoxic respiratory failure: subjectively improved howevere remains on HFNC 40lpm but looks comfortable.  -dyspnea seems more heart failure related from fluid overload/pulmonary edema and less likely COPD exacerbation.    - hold inhalers while on scheduled nebs  -trial xopenex/atrovent neb q6  -solumedrol 40mg IV q12 x 1 dose -> prednisone 40mg every day in a.m.  -Wean oxygen as able.  Goal SPO2 88-92%.  Avoid hyperoxia.  -Empiric IV zosyn in place of ctx/azithro given elevated pseudomonas risk     Possible CAP: Chest x-ray in emergency department showed new patchy airspace opacities in right lower lobe suggestive of area of pneumonitis/early proximal pneumonia.  Emphysematous changes. Cardiomegaly and mild pulmonary vascular congestion.    -Blood culture x2 ngtd, procalcitonin 0.01, respiratory panel PCR negative  -Empiric IV zosyn/Doxy  -stop vancomycin with negative nasal MRSA     Coronary artery disease: S/P CANDELARIA x3.  Coronary angiogram 1/24/2022 showed mild CAD with patent LAD stents in the proximal and mid LAD, left circumflex third obtuse marginal branch 30% stenosis, mid  RCA lesion 20% stenosis.  LVEDP and PCWP normal.  PAP 66/24 mmHg with a mean pressure of 37 mmHg.  Shikha and TD cardiac outputs both 5.7 L/min  -On warfarin, sotalol, statin  - No anginal complaints  - Troponin T-HS 31 ->30     Medtronic dual-chamber implantable cardiac defibrillator placement on 6/11/2014 and generator change on 10/28/2022 for primary prevention of sudden cardiac death.  -device interrogation on 5/16/23 reviewed.  Defer to cardiology if any changes necessary.     History of Medtronic dual-chamber permanent pacemaker placement in 1999 with generator replacement in 2005 and device removal in 2014.     Persistent atrial fibrillation: per Dr. Garrett's last note, status post multiple electrical cardioversions, most recently on 11/19/2021. He underwent complex catheter ablation x2 in 2011. Overall fairly low burden of atrial arrhythmia based on his device interrogations. [id number] score is at least 5 (congestive heart failure, hypertension, age 75 or greater, coronary artery disease).  -warfarin, pharmacy to dose  - INR 3.71     CKD stage III: Baseline creatinine 1.5-1.6. Cr 1.8 today.   -Cr slightly up from diuresis most likely.  -monitor closely  -bmp a.m.     HTN:  -Hold losartan while on IV Lasix  -Sotalol     HLD:  -lipitor     Chronic macrocytic anemia, acute thrombocytopenia- worse: TSH normal.  monitor     Hypokalemia:  -monitor and replete per protocol     Diet: Fluid restriction 1500 ML FLUID  Advance Diet as Tolerated: Regular Diet Adult; 2 gm NA Diet  Snacks/Supplements Adult: Ensure Enlive; With Meals    DVT Prophylaxis: warfarin  Sawyer Catheter: Not present  Lines: None     Cardiac Monitoring: ACTIVE order. Indication: Acute decompensated heart failure (48 hours)  Code Status: Full Code      Clinically Significant Risk Factors        # Hypokalemia: Lowest K = 3.3 mmol/L in last 2 days, will replace as needed       # Hypoalbuminemia: Lowest albumin = 3.3 g/dL at 5/20/2023  4:27 AM, will  "monitor as appropriate     # Hypertension: Noted on problem list        # Obesity: Estimated body mass index is 33.59 kg/m  as calculated from the following:    Height as of this encounter: 1.905 m (6' 3\").    Weight as of this encounter: 121.9 kg (268 lb 11.9 oz)., PRESENT ON ADMISSION  # Moderate Malnutrition: based on nutrition assessment, PRESENT ON ADMISSION        Disposition Plan      Expected Discharge Date: 05/21/2023        Discharge Comments: k+ protocol          Fortino Cook DO, DO  Hospitalist Service  [facility]  Securely message with [employer] (more info)  Text page via [facility] Paging/Directory   ______________________________________________________________________    Interval History   Events overnight noted    Physical Exam   Vital Signs: Temp: 97.6  F (36.4  C) Temp src: Oral BP: 109/58 Pulse: 63   Resp: 22 SpO2: 95 % O2 Device: High Flow Nasal Cannula (HFNC) Oxygen Delivery: 40 LPM  Weight: 268 lbs 11.85 oz    General appearance - alert, and in no distress  Eyes - sclera anicteric  Lungs - diffusely decreased, no active wheezing, currently not labored  Heart - irreg, irreg,,. + peripheral edema improving.  Abdomen - soft, nontender, BS+  Neurological - alert, oriented x3  Skin - no c/c/p     Lab/imaging reviewed  "

## 2023-05-20 NOTE — PROGRESS NOTES
"[facility]  [phone]          Assessment/Recommendations   Patient with documented cardiomyopathy, incidental coronary artery disease which was out of proportion to original reduction left ventricular systolic function.  He has had improvement in his left ventricular systolic function.  He is also had difficulties with atrial fibrillation over many years.    Admitted with pulmonary vascular congestion and fluid overload.  Good urine output with intravenous diuretics and would recommend continuing intravenous diuretics at least another 24 hours.  Would increase his activity as tolerated.  Follow his electrolytes BUN and creatinine as well as his potassium.  K replacement protocol if not already instituted.    We will see tomorrow.     Subjective   Patient with known history of nonischemic cardiomyopathy but also had incidental coronary artery disease with previous percutaneous interventions.  She had difficulty with atrial fibrillation and has had multiple device therapies and 28 cardioversions.    Admitted for shortness of breath and fluid overload.  Feels better than on admission.  Wears home O2.    No chest discomfort.  Appetite okay.  Up in a chair but not walking around much.    ECG/Tele: Personally reviewed.       Physical Examination Review of Systems   /75 (BP Location: Right arm)   Pulse 68   Temp 97.7  F (36.5  C) (Oral)   Resp 20   Ht 1.905 m (6' 3\")   Wt 121.9 kg (268 lb 11.9 oz)   SpO2 95%   BMI 33.59 kg/m    Body mass index is 33.59 kg/m .  Wt Readings from Last 3 Encounters:   05/20/23 121.9 kg (268 lb 11.9 oz)   11/16/22 123.3 kg (271 lb 14.4 oz)   11/08/22 118.8 kg (262 lb)     General Appearance:   Alert, cooperative and in no acute distress.   ENT/Mouth: Pink/moist     EYES:  no scleral icterus, normal conjunctivae   Neck: JVP 10 cm.  Positive hepatojugular reflux. Thyroid not visualized.   Chest/Lungs:   Lungs crackles at the bases, equal chest wall " "expansion.   Cardiovascular:   S1, S2 with 3/6 systolic murmur , no clicks or rubs.    Abdomen:  Nontender.   Extremities: No cyanosis, clubbing and minimal ankle edema   Skin: no xanthelasma, warm.    Neurologic: normal arm movement bilateral, no tremors     Psychiatric: Appropriate affect.      Enc Vitals  BP: 114/75  Pulse: 68  Resp: 20  Temp: 97.7  F (36.5  C)  Temp src: Oral  SpO2: 95 %  Weight: 121.9 kg (268 lb 11.9 oz)  Height: 190.5 cm (6' 3\")                                           Medical History  Surgical History Family History Social History   Past Medical History:   Diagnosis Date     Anemia      Asthma without status asthmaticus 5/5/2021     BPH (benign prostatic hyperplasia)      Cardiomyopathy (H)      COPD, group B, by GOLD 2017 classification (H)      Coronary artery disease due to calcified coronary lesion      Dyslipidemia, goal LDL below 70      Essential hypertension      History of transfusion      Persistent atrial fibrillation (H)      Pneumonia of left lower lobe due to infectious organism 10/4/2017     Skin cancer of trunk      Status post catheter ablation of atrial fibrillation 6/7/2017    PVI 4-2011 (Cryo/PVI + roof line + CTI line) Re-do PVI 7-2011 (RFA/PVI + CFE + VIDYA + confirmed CTI line)     Ventricular tachycardia (H)     Past Surgical History:   Procedure Laterality Date     CARDIAC DEFIBRILLATOR PLACEMENT       CARDIOVERSION  07/11/2018    x20, last 2/12/15, 10/2015, 11/18/16, 6/16/17 by Lauren Foster CNP     CARDIOVERSION  07/11/2018     CARDIOVERSION  11/19/2021     COLONOSCOPY N/A 4/28/2017    Procedure: COLONOSCOPY with 2 ascending polyps and 1 transverse polyp;  Surgeon: Jose Whittington MD;  Location: [facility] GI;  Service:      CV CORONARY ANGIOGRAM N/A 9/20/2017    Procedure: Coronary Angiogram;  Surgeon: Sergio Cervantes MD;  Location: [facility] Cath Lab;  Service:      CV CORONARY ANGIOGRAM N/A 1/24/2022    Procedure: Coronary Angiogram;  Surgeon: Chip" MD Christi;  Location: [facility] CATH LAB CV     CV LEFT HEART CATH N/A 1/24/2022    Procedure: Left Heart Cath;  Surgeon: Christi Saunders MD;  Location: [facility] CATH LAB CV     CV RIGHT AND LEFT HEART CATH N/A 1/24/2022    Procedure: Right and Left Heart Catherization;  Surgeon: Christi Saunders MD;  Location: [facility] CATH LAB CV     EP ICD GENERATOR REPLACEMENT DUAL N/A 10/28/2022    Procedure: Implantable Cardioverter Defibrillator Generator Replacement Dual;  Surgeon: Elo Collins MD;  Location: [facility] LAB CV     EP ICD INSERT       FRACTURE SURGERY Left     wrist     INGUINAL HERNIA REPAIR Left 1967    while in the [employer] in [place] after 13 month in [place]     INSERT / REPLACE / REMOVE PACEMAKER       IR MISCELLANEOUS PROCEDURE  4/30/2014     OTHER SURGICAL HISTORY      left hand surgery---tendon repair     MD ABLATE HEART DYSRHYTHM FOCUS  04/2011    Catheter Ablation Atrial Fibrillation PVI Apr 2011 (Cryo+RF-PVI + roof line + CTI line)     MD ABLATE HEART DYSRHYTHM FOCUS  07/2011    Re-do PVI Jul 2011 (RFA-PVI + CFE + VIDYA + confirmation of CTI line)     TOTAL SHOULDER REPLACEMENT Right 03/03/2016    Dr. Abernathy of [facility]     WRIST SURGERY Left      ZZC MYERS W/O FACETEC FORAMOT/DSKC 1/2 VRT SEG, CERVICAL      Laminectomy Lumbar;  Recorded: 03/09/2012;    Family History   Problem Relation Age of Onset     Cancer Mother         leukemia     Cancer Father         bladder     Cancer Sister         breast with lung met.     Aneurysm Sister      CABG Brother      CABG Brother      Valvular heart disease Brother         valve replacement    Social History     Socioeconomic History     Marital status:      Spouse name: Not on file     Number of children: Not on file     Years of education: Not on file     Highest education level: Not on file   Occupational History     Not on file   Tobacco Use     Smoking status: Former     Packs/day: 1.00     Years: 4.00     Pack years: 4.00      Types: Cigarettes     Quit date: 1968     Years since quittin.4     Smokeless tobacco: Never   Vaping Use     Vaping status: Never Used   Substance and Sexual Activity     Alcohol use: Yes     Alcohol/week: 2.0 standard drinks of alcohol     Comment: Alcoholic Drinks/day: 1 beer per week     Drug use: No     Sexual activity: Yes     Partners: Female     Birth control/protection: Post-menopausal   Other Topics Concern     Parent/sibling w/ CABG, MI or angioplasty before 65F 55M? Not Asked   Social History Narrative    Preloaded 2013     Social Determinants of Health     Financial Resource Strain: Not on file   Food Insecurity: Not on file   Transportation Needs: Not on file   Physical Activity: Not on file   Stress: Not on file   Social Connections: Not on file   Intimate Partner Violence: Not on file   Housing Stability: Not on file          Medications  Allergies   No current outpatient medications on file.    Allergies   Allergen Reactions     Adhesive Tape Other (See Comments)     ADHESIVE TAPE; SKIN IRRITATION; Skin pulled off with foam tape       Amiodarone      ADVERSE REACTION.  Sunlight sensitivity.     Lisinopril          Lab Results    Chemistry/lipid CBC Cardiac Enzymes/BNP/TSH/INR   Lab Results   Component Value Date    CHOL 109 10/06/2022    HDL 58 10/06/2022    TRIG 39 10/06/2022    BUN 48.2 (H) 2023     2023    CO2 25 2023    Lab Results   Component Value Date    WBC 10.3 2023    HGB 10.9 (L) 2023    HCT 36.6 (L) 2023     (H) 2023     (L) 2023    Lab Results   Component Value Date     (H) 2020    TSH 2.38 2023    INR 3.71 (H) 2023

## 2023-05-20 NOTE — PLAN OF CARE
Problem: Heart Failure  Goal: Optimal Cardiac Output  Outcome: Progressing  Goal: Optimal Functional Ability  Intervention: Optimize Functional Ability  Recent Flowsheet Documentation  Taken 5/20/2023 1247 by Lucia Ellsworth, RN  Activity Management: activity adjusted per tolerance  Taken 5/20/2023 0900 by Lucia Ellsworth, RN  Activity Management: activity adjusted per tolerance  Goal: Effective Oxygenation and Ventilation  Intervention: Promote Airway Secretion Clearance  Recent Flowsheet Documentation  Taken 5/20/2023 1247 by Lucia Ellsworth, RN  Activity Management: activity adjusted per tolerance  Taken 5/20/2023 0900 by Lucia Ellsworth, RN  Activity Management: activity adjusted per tolerance   Goal Outcome Evaluation:    High flow NC 50 FIO2. 40 L.  Sats 87-93% depending if pt is eating, or moving.  Pt says  he feels better today.  Gave miralax.  LBM 17th.  Intake 520 ml.  Output 1200.  Afib BBB.  K+ 3.4 give dose, recheck at 2000.  Pt walks with SBA but becomes SOB and sats drop, takes few minutes to recover.

## 2023-05-21 NOTE — PLAN OF CARE
Problem: Heart Failure  Goal: Stable Heart Rate and Rhythm  Outcome: Not Progressing  Goal: Optimal Functional Ability  Intervention: Optimize Functional Ability  Recent Flowsheet Documentation  Taken 5/21/2023 1327 by Lucia Ellsworth RN  Activity Management: activity adjusted per tolerance  Taken 5/21/2023 0900 by Lucia Ellsworth RN  Activity Management: activity adjusted per tolerance  Goal: Effective Oxygenation and Ventilation  5/21/2023 1451 by Lucia Ellsworth, RN  Outcome: Not Progressing  5/21/2023 1449 by Lucia Ellsworth RN  Outcome: Not Progressing  Intervention: Promote Airway Secretion Clearance  Recent Flowsheet Documentation  Taken 5/21/2023 1327 by Lucia Ellsworth, RN  Activity Management: activity adjusted per tolerance  Taken 5/21/2023 0900 by Lucia Ellsworth RN  Activity Management: activity adjusted per tolerance   Goal Outcome Evaluation:    On High flow 45 LPM 60% FIO2.  RT helped take pt to the bathroom with aide.  RT said pts sats dropped to 64% and said pt should not be walking the the bathroom.  Pt and family were informed.  Intake 540 ml.  Output in primofit 850 ml.  Pt also voided and had lg bm in toilet.  Pulmonary saw pt and ordered an Echo with bubble test at 1500.  Afib BBB.

## 2023-05-21 NOTE — PROGRESS NOTES
"RESPIRATORY CARE NOTE    Xopenex and Atrovent given X1 on schedule.  Breath sounds diminished pre and post.  Pt is on high-flow nasal cannula: FiO2 50%, 40 Lpm    Blood pressure 109/60, pulse 80, temperature 98  F (36.7  C), temperature source Oral, resp. rate 16, height 1.905 m (6' 3\"), weight 121.8 kg (268 lb 8.3 oz), SpO2 93 %.     Roberto Bolton, RT    " PATIENT ARRIVED AMBULATORY TO ROOM C/O A CONGESTED COUGH X3 WEEKS. PATIENT C/O INTERMITTENT WHEEZING. +CHILLS. PER FATHER PATIENT NORMALLY HAS AN ELEVATED HEART RATE. NO N/V/D.

## 2023-05-21 NOTE — PLAN OF CARE
Goal Outcome Evaluation:                      Heart Failure Care Map  GOALS TO BE MET BEFORE DISCHARGE:    1. Decrease congestion and/or edema with diuretic therapy to achieve near optimal volume status.     Dyspnea improved: No, further care required to meet this goal. Please explain Patient denies shortness of breath with activity, but continues to desaturate easily   Edema improved: Yes, satisfactory for discharge.        Last 24 hour I/O:   Intake/Output Summary (Last 24 hours) at 5/21/2023 0601  Last data filed at 5/21/2023 0335  Gross per 24 hour   Intake 950 ml   Output 3500 ml   Net -2550 ml           Net I/O and Weights since admission:   04/21 0700 - 05/21 0659  In: 3638 [P.O.:3035; I.V.:603]  Out: 8400 [Urine:8400]  Net: -4762     Vitals:    05/18/23 0839 05/18/23 1815 05/19/23 0412 05/20/23 0348   Weight: 130.6 kg (288 lb) 122 kg (268 lb 15.4 oz) 123.5 kg (272 lb 4.3 oz) 121.9 kg (268 lb 11.9 oz)    05/21/23 0335   Weight: 121.8 kg (268 lb 8.3 oz)       2.  O2 sats > 90% on room air, or at prior home O2 therapy level.      Able to wean O2 this shift to keep sats above 90%?: No, further care required to meet this goal. Please explain continues to require high flow nasal cannula   Does patient use Home O2? Yes-            Current oxygenation status:   SpO2: 93 %     O2 Device: High Flow Nasal Cannula (HFNC), Oxygen Delivery: 45 LPM    3.  Tolerates ambulation and mobility near baseline.     Ambulation: No, further care required to meet this goal. Please explain patient remained in bed over night   Times patient ambulated with staff this shift: 0    Please review the Heart Failure Care Map for additional HF goal outcomes.    Ann Carrion RN  5/21/2023      Patient denied pain, denied shortness of breath. External catheter exchanged once, was adequately measuring output.  Tele leads changed once due to concern with interference.  Patient did not have chest pain, palpitations, chest tightness.  Slept  between cares.  Will continue to monitor.

## 2023-05-21 NOTE — PROGRESS NOTES
"[facility]  [phone]          Assessment/Recommendations   Patient with documented cardiomyopathy, incidental coronary artery disease which was out of proportion to original reduction left ventricular systolic function.  He has had improvement in his left ventricular systolic function.  He is also had difficulties with atrial fibrillation over many years.     Admitted with pulmonary vascular congestion and fluid overload.  Good urine output with intravenous diuretics and would recommend continuing intravenous diuretics at least another 24 hours.  Would increase his activity as tolerated.  Follow his electrolytes BUN and creatinine as well as his potassium.  K replacement protocol if not already instituted.    Patient is on a very high dose of sotalol with abnormal renal function.  Renal function back in 2020 started to increase mildly.  We will ask pharmacy for guidance and recommendations for dosing although patient says that this particular dose of sotalol has been successful at keeping him in sinus rhythm.  With slightly worsening renal insufficiency, his blood levels could possibly be adequate at lower dose?    Increase activity as tolerated.       Subjective   Patient reports that breathing is a bit better.  Frustrated that his oxygen drops when he is active or in the bathroom.  No chest pain.  No stomach pain.    ECG/Tele: Personally reviewed.  Sinus rhythm on monitor with pacing       Physical Examination Review of Systems   /78 (BP Location: Right arm)   Pulse 64   Temp 97.6  F (36.4  C) (Oral)   Resp 18   Ht 1.905 m (6' 3\")   Wt 121.8 kg (268 lb 8.3 oz)   SpO2 95%   BMI 33.56 kg/m    Body mass index is 33.56 kg/m .  Wt Readings from Last 3 Encounters:   05/21/23 121.8 kg (268 lb 8.3 oz)   11/16/22 123.3 kg (271 lb 14.4 oz)   11/08/22 118.8 kg (262 lb)     General Appearance:   Alert, cooperative and in no acute distress.   ENT/Mouth: Pink/moist     EYES:  no scleral " "icterus, normal conjunctivae   Neck: JVP 12 cm.  Positive hepatojugular reflux. Thyroid not visualized.   Chest/Lungs:   Lungs are clear to auscultation anteriorly and laterally, equal chest wall expansion.   Cardiovascular:   S1, S2 with 2/6 systolic murmur ,no clicks or rubs.    Abdomen:  Nontender.    Extremities: No cyanosis, clubbing or edema   Skin: no xanthelasma, warm.    Neurologic: normal arm movement bilateral, no tremors     Psychiatric: Appropriate affect.      Enc Vitals  BP: 117/78  Pulse: 64  Resp: 18  Temp: 97.6  F (36.4  C)  Temp src: Oral  SpO2: 95 %  Weight: 121.8 kg (268 lb 8.3 oz)  Height: 190.5 cm (6' 3\")                                           Medical History  Surgical History Family History Social History   Past Medical History:   Diagnosis Date     Anemia      Asthma without status asthmaticus 5/5/2021     BPH (benign prostatic hyperplasia)      Cardiomyopathy (H)      COPD, group B, by GOLD 2017 classification (H)      Coronary artery disease due to calcified coronary lesion      Dyslipidemia, goal LDL below 70      Essential hypertension      History of transfusion      Persistent atrial fibrillation (H)      Pneumonia of left lower lobe due to infectious organism 10/4/2017     Skin cancer of trunk      Status post catheter ablation of atrial fibrillation 6/7/2017    PVI 4-2011 (Cryo/PVI + roof line + CTI line) Re-do PVI 7-2011 (RFA/PVI + CFE + VIDYA + confirmed CTI line)     Ventricular tachycardia (H)     Past Surgical History:   Procedure Laterality Date     CARDIAC DEFIBRILLATOR PLACEMENT       CARDIOVERSION  07/11/2018    x20, last 2/12/15, 10/2015, 11/18/16, 6/16/17 by Lauren Foster CNP     CARDIOVERSION  07/11/2018     CARDIOVERSION  11/19/2021     COLONOSCOPY N/A 4/28/2017    Procedure: COLONOSCOPY with 2 ascending polyps and 1 transverse polyp;  Surgeon: Jose Whittington MD;  Location: [facility];  Service:      CV CORONARY ANGIOGRAM N/A 9/20/2017    Procedure: Coronary " Angiogram;  Surgeon: Sergio Cervantes MD;  Location: [facility] Cath Lab;  Service:      CV CORONARY ANGIOGRAM N/A 1/24/2022    Procedure: Coronary Angiogram;  Surgeon: Christi Saunders MD;  Location: [facility] CATH LAB CV     CV LEFT HEART CATH N/A 1/24/2022    Procedure: Left Heart Cath;  Surgeon: Christi Saunders MD;  Location: [facility] CATH LAB CV     CV RIGHT AND LEFT HEART CATH N/A 1/24/2022    Procedure: Right and Left Heart Catherization;  Surgeon: Christi Saunders MD;  Location: [facility] CV     EP ICD GENERATOR REPLACEMENT DUAL N/A 10/28/2022    Procedure: Implantable Cardioverter Defibrillator Generator Replacement Dual;  Surgeon: Elo Collins MD;  Location: [facility] CV     EP ICD INSERT       FRACTURE SURGERY Left     wrist     INGUINAL HERNIA REPAIR Left 1967    while in the [employer] in [place] after 13 month in [place]     INSERT / REPLACE / REMOVE PACEMAKER       IR MISCELLANEOUS PROCEDURE  4/30/2014     OTHER SURGICAL HISTORY      left hand surgery---tendon repair     IA ABLATE HEART DYSRHYTHM FOCUS  04/2011    Catheter Ablation Atrial Fibrillation PVI Apr 2011 (Cryo+RF-PVI + roof line + CTI line)     IA ABLATE HEART DYSRHYTHM FOCUS  07/2011    Re-do PVI Jul 2011 (RFA-PVI + CFE + VIDYA + confirmation of CTI line)     TOTAL SHOULDER REPLACEMENT Right 03/03/2016    Dr. Abernathy of [facility]     WRIST SURGERY Left      ZZC MYERS W/O FACETEC FORAMOT/DSKC 1/2 VRT SEG, CERVICAL      Laminectomy Lumbar;  Recorded: 03/09/2012;    Family History   Problem Relation Age of Onset     Cancer Mother         leukemia     Cancer Father         bladder     Cancer Sister         breast with lung met.     Aneurysm Sister      CABG Brother      CABG Brother      Valvular heart disease Brother         valve replacement    Social History     Socioeconomic History     Marital status:      Spouse name: Not on file     Number of children: Not on file     Years of education: Not on file      Highest education level: Not on file   Occupational History     Not on file   Tobacco Use     Smoking status: Former     Packs/day: 1.00     Years: 4.00     Pack years: 4.00     Types: Cigarettes     Quit date: 1968     Years since quittin.4     Smokeless tobacco: Never   Vaping Use     Vaping status: Never Used   Substance and Sexual Activity     Alcohol use: Yes     Alcohol/week: 2.0 standard drinks of alcohol     Comment: Alcoholic Drinks/day: 1 beer per week     Drug use: No     Sexual activity: Yes     Partners: Female     Birth control/protection: Post-menopausal   Other Topics Concern     Parent/sibling w/ CABG, MI or angioplasty before 65F 55M? Not Asked   Social History Narrative    Preloaded 2013     Social Determinants of Health     Financial Resource Strain: Not on file   Food Insecurity: Not on file   Transportation Needs: Not on file   Physical Activity: Not on file   Stress: Not on file   Social Connections: Not on file   Intimate Partner Violence: Not on file   Housing Stability: Not on file          Medications  Allergies   No current outpatient medications on file.    Allergies   Allergen Reactions     Adhesive Tape Other (See Comments)     ADHESIVE TAPE; SKIN IRRITATION; Skin pulled off with foam tape       Amiodarone      ADVERSE REACTION.  Sunlight sensitivity.     Lisinopril          Lab Results    Chemistry/lipid CBC Cardiac Enzymes/BNP/TSH/INR   Lab Results   Component Value Date    CHOL 109 10/06/2022    HDL 58 10/06/2022    TRIG 39 10/06/2022    BUN 51.9 (H) 2023     2023    CO2 24 2023    Lab Results   Component Value Date    WBC 9.5 2023    HGB 11.2 (L) 2023    HCT 36.3 (L) 2023     (H) 2023     (L) 2023    Lab Results   Component Value Date     (H) 2020    TSH 2.38 2023    INR 3.67 (H) 2023

## 2023-05-21 NOTE — PROVIDER NOTIFICATION
05/21/23 0723   Oxygen Therapy   SpO2 90 %   O2 Device High Flow Nasal Cannula (HFNC)   FiO2 (%) 60 %   Oxygen Delivery 45 LPM     Nebs done. BS diminished bilaterally. No respiratory distress noted. Will continue to follow.     Beltran Carpenter RT

## 2023-05-21 NOTE — PLAN OF CARE
Problem: Risk for Delirium  Goal: Improved Behavioral Control  Intervention: Minimize Safety Risk  Recent Flowsheet Documentation  Taken 5/20/2023 1600 by Rosie Jane RN  Trust Relationship/Rapport: questions answered     Problem: Heart Failure  Goal: Optimal Cardiac Output  Outcome: Progressing     Problem: Heart Failure  Goal: Effective Oxygenation and Ventilation  Intervention: Optimize Oxygenation and Ventilation  Recent Flowsheet Documentation  Taken 5/20/2023 1700 by Rosie Jane RN  Head of Bed (HOB) Positioning: HOB at 20-30 degrees  Taken 5/20/2023 1600 by Rosie Jane RN  Head of Bed (HOB) Positioning: HOB at 20-30 degrees     Pt sats 73-84 % in chair. Pt kept falling asleep and blocking air way. RT called.  RT suggested get pt back to bed to lay his head back to help air way.      At 6 pm Pt wanted to get up to bathroom. Sats 88-90%  on 50% fiO2. 40L.  Pt and wife adamant about pt walking to bathroom. Said he does it at home and did it earlier with staff with portable.  O2 tank. Pt was going to get up on his own with his wife.   Called RT  to help situation. Unable to change pt and wife's mind. Oxymask at 15L with extention. Pt went to bathroom. Pt sats 73% had 2 staff members help pt back to bed. Put Highflow back on pt.  RT adjusted to 82% FiO2 40L  took pt about 15 to 20 minutes to recover.  Sats 100%  RT came back adjust high flow to 50% FiO2 45 L At 1900.  Told pt we will not be walking to bathroom anymore. We will have a commode to use at bedside so we do not have to take him off.

## 2023-05-21 NOTE — CONSULTS
[facility]:  PULMONARY CONSULT NOTE     Date / Time of Admission:  5/18/2023  8:33 AM    ID: Buck Sinclair is a 78 year old male with chronic hypoxic respiratory failure on 10 L/min NC since December 2022, COPD GOLD B, history of environmental exposures (Agent Orange, dust/fire inhalation), very remote tobacco use (quit 1968), coronary artery disease s/p PCI/stents, atrial fibrillation s/p PVL with ICD/PPM on chronic anticoagulation with warfarin, mild-moderate RV dysfunction, WHO Group III pulmonary hypertension (mPAP 37 mmHg 01/2022), history of LLL pneumonia and cavitary lesion 10/2017, recurrent LLL pneumonia that subsequently resolved who presented to [facility] ED on 5/18/2023 from Pulmonary Clinic with hypoxia/cyanosis (O2 sat 70s%), admitted for acute on chronic hypoxic respiratory failure and heart failure exacerbation.    Reason for Consult:  Acute on chronic hypoxic respiratory failure non-responsive to diuretics         Assessment:   Acute on chronic hypoxic respiratory failure, progressive RV dysfunction with acute exacerbation, pulmonary hypertension (WHO class III), COPD with possible exacerbation.  Patient has extensive medical history in the setting of multiple environmental exposures (Agent Orange in [place] and fire/dust inhalation as ). In the last few years, he was started on supplemental O2, but since December 2022, his O2 needs had escalated upwards of 10 L/min NC, with increased hypoxia and volume overload in the last few weeks.  While he has extensive pulmonary and cardiac pathology, I question if he is developing progression of his underlying pulmonary hypertension leading to such high O2 needs. TTE this admission showing worsening RV dysfunction. His last RHC was in 01/2022 and at that time, mPAP 37 mmHg and PCWP was normal. His pulm HTN has been attributed to COPD (WHO Group III disease).      Alternatives can include new cardiac shunt  physiology with a septal defect.     He does not appear to have an active COPD exacerbation during my exam, and I agree with transition from IV steroids to oral therapy.  D-Dimer is normal, no concern for venothromboembolic disease and he is therapeutic on warfarin.      CODE STATUS: Full Code.        Plan:     Wean supplemental O2 as tolerates, goal O2 sat >90%.     HFNC, 20 - 60 L/min, titrate as tolerates    VBG reviewed 5/20 - no indication of CO2 retention.  No need for NIPPV at this juncture.     Reviewed CT chest images with patient and his wife -     Reviewed TTE findings with patient/wife.  Will pursue TTE with bubble study to evaluate for shunt.     D-dimer returned as normal 0.33, low risk for venothromboembolic disease (also therapeutic INR).     No indication for V/Q scan at this time in setting of normal D-Dimer, therapeutic INR.     Continue aggressive diuresis as renal function tolerates.     If remains persistently hypoxic despite aggressive diuresis + normal bubble study, may consider RHC to re-evaluate extent of his pulmonary hypertension.     Last RHC 01/2022: PAP 66/24 mmHg, mPAP 37 mmHg, PCPW normal.    Was enrolled in clinical trial 2022, trial completion date October 2022 (A Phase 3 Adaptive Study to Evaluate the Safety and Efficacy of Inhaled Treprostinil in Participants With Pulmonary Hypertension (PH) Due to Chronic Obstructive Pulmonary Disease (COPD) - Full Text View - ClinicalTrials.gov ). Patient endorsed no symptomatic improvement during trial.     Continue prednisone 40 mg daily (start 5/21) - received solumedrol IV x 1 on 5/20.    Continue doxycycline IV (start 5/19) and Zosyn IV (start 5/19) for now.      Continue Levalbuterol/Atroven nebs TID and PRN.     Holding home inhaler while on systemic steroids.     Other medical management per primary team.     PPI for GI prophylaxis, warfarin for DVT prophylaxis.     Patient and/or family were educated on the above recommendations.    Thank you for allowing our service to participate in the care of this patient.  Please call with any questions or concerns. Dr. Umaña will resume care of the Pulmonary Service on 5/22.     Total patient care time: 90 minutes, with over 50% spent in counseling/coordination of care.      Samanta Zavala MD, MPH  Pulmonary/Critical Care Medicine  05/21/2023  1:27 PM       Chief Complaint Chief Complaint   Patient presents with     Shortness of Breath               HPI:   Buck Sinclair is a 78 year old male with chronic hypoxic respiratory failure on 10 L/min NC since December 2022, COPD GOLD B, history of environmental exposures (Agent Orange, dust/fire inhalation), very remote tobacco use (quit 1968), coronary artery disease s/p PCI/stents, atrial fibrillation s/p PVL with ICD/PPM on chronic anticoagulation with warfarin, mild-moderate RV dysfunction, pulmonary hypertension (mPAP 37 mmHg 01/2022), history of LLL pneumonia and cavitary lesion 10/2017, recurrent LLL pneumonia that subsequently resolved who presented to [facility] ED on 5/18/2023 from Pulmonary Clinic with hypoxia/cyanosis (O2 sat 70s%), admitted for acute on chronic hypoxic respiratory failure and heart failure exacerbation.  History is provided by the patient, wife, review of records.    Patient reports that he was started on supplemental O2 a few years ago, initially at 2 LPM, then increased to 4 LPM, then 6 LPM.  He noticed that in December 2022 or so, started having worsening hypoxia and increased his supplemental O2 to 10 LPM continuously.  States that he discussed this with the Cardiologist at [facility].  Believes that he has been about the same the last few months, but his wife has noticed some worsening symptoms in the last few weeks including increased lower extremity edema.  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  In retrospect, patient has noticed some  "increased \"tightness\" in the legs, particularly when wearing socks/shoes. Came to Pulmonary Clinic on 5/18, found to be hypoxic with O2 sat 70s% on 10 LPM, sent to ED for evaluation. NTproBNP 3644. Admitted for heart failure.      Since admission, was placed on HFNC 5/19. Was given doxycycline/ceftriaxone on 5/18 in ED, then started on Zosyn + doxycycline on 5/19. Also started on IV solumedrol 5/18, switched to prednisone on 5/21.     Previously worked as a , went on disability in 1997. Was in [place], quit smoking in 1968, had Agent Orange exposure at the time.  States that coronary disease thought attributed to Agent Orange - treatment was provided under it. Otherwise, was enrolled in a research study at [facility] [Open-Label Extension study of Inhaled Treprostinil in Patients with Pulmonary Hypertension due to Chronic Obstructive Pulmonary Disease (PH-COPD), IRB#[id number]] in 2022.  Was         Review of Systems:   Review of Systems:  Pertinent items are noted in HPI.  The Review of Systems is negative other than noted in the HPI        Medical/Surgical History:     Past Medical History:   Diagnosis Date     Anemia      Asthma without status asthmaticus 5/5/2021     BPH (benign prostatic hyperplasia)      Cardiomyopathy (H)      COPD, group B, by GOLD 2017 classification (H)      Coronary artery disease due to calcified coronary lesion      Dyslipidemia, goal LDL below 70      Essential hypertension      History of transfusion      Persistent atrial fibrillation (H)      Pneumonia of left lower lobe due to infectious organism 10/4/2017     Skin cancer of trunk      Status post catheter ablation of atrial fibrillation 6/7/2017    PVI 4-2011 (Cryo/PVI + roof line + CTI line) Re-do PVI 7-2011 (RFA/PVI + CFE + VIDYA + confirmed CTI line)     Ventricular tachycardia (H)       Past Surgical History:   Procedure Laterality Date     CARDIAC DEFIBRILLATOR PLACEMENT       CARDIOVERSION  07/11/2018    x20, last " 2/12/15, 10/2015, 11/18/16, 6/16/17 by Laurne Foster CNP     CARDIOVERSION  07/11/2018     CARDIOVERSION  11/19/2021     COLONOSCOPY N/A 4/28/2017    Procedure: COLONOSCOPY with 2 ascending polyps and 1 transverse polyp;  Surgeon: Jose Whittington MD;  Location: [facility];  Service:      CV CORONARY ANGIOGRAM N/A 9/20/2017    Procedure: Coronary Angiogram;  Surgeon: Sergio Cervantes MD;  Location: [facility] Cath Lab;  Service:      CV CORONARY ANGIOGRAM N/A 1/24/2022    Procedure: Coronary Angiogram;  Surgeon: Christi Saunders MD;  Location: [facility] CATH LAB CV     CV LEFT HEART CATH N/A 1/24/2022    Procedure: Left Heart Cath;  Surgeon: Christi Saunders MD;  Location: [facility] CATH LAB CV     CV RIGHT AND LEFT HEART CATH N/A 1/24/2022    Procedure: Right and Left Heart Catherization;  Surgeon: Christi Saunders MD;  Location: [facility] CV     EP ICD GENERATOR REPLACEMENT DUAL N/A 10/28/2022    Procedure: Implantable Cardioverter Defibrillator Generator Replacement Dual;  Surgeon: Elo Collins MD;  Location: [facility] CV     EP ICD INSERT       FRACTURE SURGERY Left     wrist     INGUINAL HERNIA REPAIR Left 1967    while in the [employer] in [place] after 13 month in [place]     INSERT / REPLACE / REMOVE PACEMAKER       IR MISCELLANEOUS PROCEDURE  4/30/2014     OTHER SURGICAL HISTORY      left hand surgery---tendon repair     [place] ABLATE HEART DYSRHYTHM FOCUS  04/2011    Catheter Ablation Atrial Fibrillation PVI Apr 2011 (Cryo+RF-PVI + roof line + CTI line)     [place] ABLATE HEART DYSRHYTHM FOCUS  07/2011    Re-do PVI Jul 2011 (RFA-PVI + CFE + VIDYA + confirmation of CTI line)     TOTAL SHOULDER REPLACEMENT Right 03/03/2016    Dr. Abernathy of [facility]     WRIST SURGERY Left      ZZC MYERS W/O FACETEC FORAMOT/DSKC 1/2 VRT SEG, CERVICAL      Laminectomy Lumbar;  Recorded: 03/09/2012;            Allergies/Medications:   Allergies:     Allergies   Allergen Reactions     Adhesive Tape Other  "(See Comments)     ADHESIVE TAPE; SKIN IRRITATION; Skin pulled off with foam tape       Amiodarone      ADVERSE REACTION.  Sunlight sensitivity.     Lisinopril        OUTPATIENT Medications:  Medications Prior to Admission   Medication Sig Dispense Refill Last Dose     acetaminophen (TYLENOL) 325 MG tablet Take 650 mg by mouth 2 times daily as needed   Past Week at prn     albuterol (PROAIR HFA/PROVENTIL HFA/VENTOLIN HFA) 108 (90 Base) MCG/ACT inhaler Inhale 2 puffs into the lungs every 4 hours as needed for shortness of breath / dyspnea or wheezing   5/18/2023 at am     Ascorbic Acid (VITAMIN C) 500 MG CAPS Take 1,000 mg by mouth daily   5/17/2023 at am     atorvastatin (LIPITOR) 40 MG tablet Take 40 mg by mouth every evening   5/17/2023 at pm     budesonide-formoterol (SYMBICORT) 160-4.5 MCG/ACT Inhaler Inhale 2 puffs into the lungs 2 times daily   5/18/2023 at am     co-enzyme Q-10 100 MG CAPS capsule Take 2 capsules (200 mg) by mouth daily 2 capsule  5/17/2023 at pm     fish oil-omega-3 fatty acids 1000 MG capsule Take 1 g by mouth 2 times daily   5/17/2023 at pm     FLUoxetine (PROZAC) 20 MG capsule TAKE 1 CAPSULE BY MOUTH EVERY DAY   5/18/2023 at am     furosemide (LASIX) 80 MG tablet TAKE 1 TABLET BY MOUTH TWICE DAILY. MAY TAKE ONE-HALF TABLET DAILY AS NEEDED FOR RETAINED FLUID. Strength: 80 mg 225 tablet 0 5/18/2023 at am     losartan (COZAAR) 25 MG tablet Take 25 mg by mouth daily   5/18/2023 at am     MAG64 64 MG TBEC CR tablet TAKE 2 TABLETS BY MOUTH EVERY DAY (Patient taking differently: Take 2 tablets by mouth every evening With food) 180 tablet 1 5/17/2023 at pm     multivitamin, therapeutic (THERA-VIT) TABS tablet Take 1 tablet by mouth daily   5/17/2023 at am     nitroGLYcerin (NITROSTAT) 0.4 MG sublingual tablet One tablet under the tongue every 5 minutes if needed for chest pain. May repeat every 5 minutes for a maximum of 3 doses in 15 minutes\" 25 tablet 3 Unknown at prn     polyethylene glycol " (MIRALAX) 17 GM/Dose powder Take 17 g by mouth daily    5/17/2023 at am     sotalol (BETAPACE) 160 MG tablet TAKE 1 TABLET(160 MG) BY MOUTH TWICE DAILY 180 tablet 1 5/18/2023 at am     tiotropium (SPIRIVA) 18 MCG inhaled capsule Inhale 18 mcg into the lungs daily   5/18/2023 at am     vitamin D2 (ERGOCALCIFEROL) 73795 units (1250 mcg) capsule Take 50,000 Units by mouth twice a week On Sunday and Wednesday 5/17/2023 at am     warfarin ANTICOAGULANT (COUMADIN) 2.5 MG tablet As of 6/23: 5 mg every Mon, Thu; 2.5 mg all other days 116 tablet 1 5/17/2023 at 2.5 mg dose on 5/17/23 pm     OXYGEN-HELIUM IN 4-5 L           INPATIENT Medications: Continuous Infusions:    Continuing ACE inhibitor/ARB/ARNI from home medication list OR ACE inhibitor/ARB order already placed during this visit       - MEDICATION INSTRUCTIONS -       INPATIENT Medications: Scheduled Medications:    atorvastatin  40 mg Oral QPM     doxycycline  100 mg Intravenous Q12H     FLUoxetine  20 mg Oral Daily     [Held by provider] fluticasone-vilanterol  1 puff Inhalation Daily     furosemide  80 mg Intravenous Q8H     ipratropium  0.5 mg Nebulization 3 times daily     levalbuterol  1.25 mg Nebulization 3 times daily     [Held by provider] losartan  25 mg Oral Daily     pantoprazole  40 mg Oral QAM AC     piperacillin-tazobactam  3.375 g Intravenous Q8H     polyethylene glycol  17 g Oral Daily     predniSONE  40 mg Oral Daily     senna-docusate  2 tablet Oral BID     sodium chloride (PF)  3 mL Intracatheter Q8H     sotalol  160 mg Oral Q12H [facility] (08/20)     [Held by provider] umeclidinium  1 puff Inhalation Daily     Warfarin Therapy Reminder  1 each Oral See Admin Instructions     warfarin-No DOSE today  1 each Does not apply no dose today (warfarin)             Family History:        Social History:      Reviewed:  Family History   Problem Relation Age of Onset     Cancer Mother         leukemia     Cancer Father         bladder     Cancer Sister          "breast with lung met.     Aneurysm Sister      CABG Brother      CABG Brother      Valvular heart disease Brother         valve replacement    Reviewed:    Tobacco: Very remote use, quit in  after returned from [place]. Began smoking as a teenager, smoked ~ 3/4 ppd.     Other: Lives with wife.  Previously worked as a , went on disability in . Was in [place], quit smoking in , had Agent Orange exposure at the time.   Social History     Socioeconomic History     Marital status:      Spouse name: Not on file     Number of children: Not on file     Years of education: Not on file     Highest education level: Not on file   Occupational History     Not on file   Tobacco Use     Smoking status: Former     Packs/day: 1.00     Years: 4.00     Pack years: 4.00     Types: Cigarettes     Quit date: 1968     Years since quittin.4     Smokeless tobacco: Never   Vaping Use     Vaping status: Never Used   Substance and Sexual Activity     Alcohol use: Yes     Alcohol/week: 2.0 standard drinks of alcohol     Comment: Alcoholic Drinks/day: 1 beer per week     Drug use: No     Sexual activity: Yes     Partners: Female     Birth control/protection: Post-menopausal   Other Topics Concern     Parent/sibling w/ CABG, MI or angioplasty before 65F 55M? Not Asked   Social History Narrative    Preloaded 2013     Social Determinants of Health     Financial Resource Strain: Not on file   Food Insecurity: Not on file   Transportation Needs: Not on file   Physical Activity: Not on file   Stress: Not on file   Social Connections: Not on file   Intimate Partner Violence: Not on file   Housing Stability: Not on file              Objective:   Vitals:  /84 (BP Location: Right arm)   Pulse 69   Temp 97.5  F (36.4  C) (Oral)   Resp 20   Ht 1.905 m (6' 3\")   Wt 121.8 kg (268 lb 8.3 oz)   SpO2 92%   BMI 33.56 kg/m    GEN: Pleasant male, becomes winded with long conversation and desaturates  HEENT: " Normocephalic, atraumatic.  Extraoccular eye movements intact, anicteric sclera. No sinus tenderness to palpation.  Moist mucous membranes.   NECK: Supple.    PULM: Non-labored breathing.  No use of accessory muscles.  Diminished breath sounds at bases, no wheezing.   CVS: Regular rate and rhythm.  Normal S1, S2.  No rubs, murmurs, or gallops.    ABDOMEN: Normoactive bowel sounds.  Non-tender to palpation.  Non-distended.    EXTREMITES:  No clubbing, cyanosis. Trace pitting edema at ankles.  NEURO:  Awake.  Oriented to person, place, time and situation.  Cranial nerves 2-12 grossly intact.  Moving all extremities.      Intake/Output:  I/O last 3 completed shifts:  In: 1060 [P.O.:1060]  Out: 3650 [Urine:3650]        Pertinent Studies:   All laboratory data reviewed  Serum Glucose range:   Recent Labs   Lab 05/21/23 0431 05/20/23 0427 05/19/23 0446 05/18/23  0855   * 152* 164* 122*     ABG:   Recent Labs   Lab 05/18/23  0851   PH 7.48*   PCO2 31*   PO2 119*   HCO3 23     CBC:   Recent Labs   Lab 05/21/23 0431 05/20/23 0427 05/19/23 0446 05/18/23  0855   WBC 9.5 10.3 7.1 6.8   HGB 11.2* 10.9* 10.4* 11.3*   HCT 36.3* 36.6* 34.3* 37.4*   * 105* 102* 103*   * 142* 144* 149*   NEUTROPHIL  --   --   --  64   LYMPH  --   --   --  21   MONOCYTE  --   --   --  11   EOSINOPHIL  --   --   --  4     Chemistry:   Recent Labs   Lab 05/21/23  0431 05/20/23  1910 05/20/23  1148 05/20/23 0427 05/19/23 0446 05/18/23  0855     --   --  144 141 144   POTASSIUM 3.6 3.5 3.4 3.3* 3.6 4.0   CHLORIDE 106  --   --  106 103 106   CO2 24  --   --  25 25 25   BUN 51.9*  --   --  48.2* 42.7* 38.2*   CR 1.85*  --   --  1.82* 1.61* 1.64*   GFRESTIMATED 37*  --   --  38* 44* 43*   AMBIKA 8.3*  --   --  8.5* 8.5* 9.1   MAG 2.1  --   --  2.2 2.0 2.1   PROTTOTAL  --   --   --   --  6.5 7.2   ALBUMIN  --   --   --  3.3* 3.3* 3.7   AST  --   --   --   --  24 24   ALT  --   --   --   --  15 15   ALKPHOS  --   --   --   --   56 69   BILITOTAL  --   --   --   --  1.0 1.0     Coags:  Recent Labs   Lab 05/21/23  0431 05/20/23  0427 05/19/23  0446   INR 3.67* 3.71* 3.13*     Cardiac Markers:  No results for input(s): CKTOTAL, TROPONINI in the last 168 hours.      Latest Reference Range & Units 02/09/22 10:32 02/15/22 09:41 05/17/22 11:49 10/05/22 10:02 05/18/23 08:55 05/18/23 12:03   CRP Inflammation 0.0 - 8.0 mg/L  <2.9       N-Terminal Pro Bnp 0 - 450 pg/mL 1,709 (H) 2,870 (H) 2,976 (H) 3,598 (H)     N-Terminal Pro BNP Inpatient 0 - 1,800 pg/mL     3,644 (H)    Procalcitonin <0.05 ng/mL      0.05 (H)   (H): Data is abnormally high    Microbiology:  Blood culture x 2, 5/18: NGTD  MRSA/MSSA swab, 5/20: Negative for MRSA target DNA. Positive for SA target DNA.        Cardiology/Radiology:   Cardiac: All cardiac studies reviewed by me.    EKG:  Reviewed    TTE, 5/18/23:  Summary:  Left ventricular size, wall motion and function are normal. The ejection fraction is 55-60%. The right ventricle is moderately dilated. Moderately decreased right ventricular systolic function. The left atrium is moderately dilated. The right atrium is severely dilated. There is moderate to mod-severe (2-3+) tricuspid regurgitation. Severe (>55mmHg) pulmonary hypertension is present. IVC diameter >2.1 cm collapsing <50% with sniff suggests a high RA pressure estimated at 15 mmHg or greater.    Radiology: All imaging studies reviewed by me.    Chest X-Ray, 5/18/23:  Multi lead right subclavian venous pacemaker. Leads are intact and unchanged in position.    There are new, patchy airspace opacities in the right lower lobe suggestive of an area of pneumonitis/early bronchopneumonia. Emphysematous changes are better appreciated on the CT.  No change in the cardiomegaly and mild pulmonary vascular congestion. No signs of failure.     CT chest w/o contrast, 5/19/23:  FINDINGS:  LUNGS AND PLEURA: Unchanged parenchymal heterogeneity relatively lucent apices and hazy  groundglass attenuation within the bases. There are areas of subpleural architectural distortion and opacity in both bases consistent with cicatrization atelectasis. Such opacity in the medial right lower lobe is associated with crowding of bronchovascular structures and a few internal air bronchograms. These findings are unchanged. No new airspace opacities or interlobular septal thickening. Trachea and central airways are patent and normal caliber. No endoluminal airway material. MEDIASTINUM: Right subclavian approach dual chamber pacemaker has right atrial appendage and right ventricular leads. Severe enlargement of the right atrium. Mild enlargement of the left atrium. There are left atrial wall calcifications, likely sequela of prior ablation procedure. Dilated main and central pulmonary arteries. Mild calcification of aortic and mitral annuli. No pericardial effusion or thickening. Hilar and mediastinal lymph nodes are normal by size criteria. Esophagus is decompressed. CORONARY ARTERY CALCIFICATION: Severe. UPPER ABDOMEN: Symmetric renal atrophy.  MUSCULOSKELETAL: Unchanged thoracic spine degenerative osteophytes from T3 through the thoracolumbar junction. No vertebral compression deformity. There is a right shoulder joint replacement. IMPRESSION:    1.  Unchanged bibasilar peripheral parenchymal scarring. No acute lung, airway, or pleural space abnormality. 2.  Biatrial and right ventricular heart enlargement. Dilated central pulmonary arteries. Findings are consistent with acute pulmonary hypertension. Calcification in the walls of the left atrium likely from prior ablation procedure.

## 2023-05-21 NOTE — PROGRESS NOTES
[facility] Progress Note - Hospitalist Service    Date of Admission:  5/18/2023    Assessment & Plan   HFrEF with ADHF: deemed nonischemic dilated cardiomyopathy.    -TTE (9/13/2022) LVEF 55 to 60%, moderate TI, global RV function mild-moderately reduced, RSVP approximately 73 mmHg indicative of moderate pulmonary hypertension, estimated RA pressure 15 mmHg or greater.  -TTE (5/18/23) showed LVEF 55-60%, RV mod dilated, mod decreased RVS function, mod LAD, severe RAD, 2-3+ TR, severe PHTN, ~RAP >15mmHg  -Remains hypervolemic. Net negative 5.2L.  -IV Lasix 80mg TID with hold parameters (Normally on 80 mg twice daily)  -Cardiology consult.  Assistance appreciated.  - Hold losartan while diuresing  -strict I/O, daily weights     Severe COPD; moderate-to-severe pulmonary hypertension; acute on chronic hypoxic respiratory failure: subjectively improved however remains on HFNC at FiO2 60% up to 45L from 40lpm, but looks comfortable and no distress.   -Chest x-ray in emergency department showed new patchy airspace opacities in right lower lobe suggestive of area of pneumonitis/early proximal pneumonia.  Emphysematous changes. Cardiomegaly and mild pulmonary vascular congestion.    -CT Chest w/o contrast due to CHRISSY (5/19/23) showed Unchanged bibasilar peripheral parenchymal scarring. No acute lung, airway, or pleural space abnormality. Biatrial and right ventricular heart enlargement. Dilated central pulmonary arteries.   - hold inhalers while on scheduled nebs  -xopenex/atrovent neb q6  -prednisone 40mg every day in a.m.  -Wean oxygen as able.  Goal SPO2 88-92%.  Avoid hyperoxia.  -Empiric IV zosyn/doxy although all cultures are negative.  -Pulmonology consult as I would expect his hypoxia and dyspnea would improve and not be getting worse with diuresis and the 5.2L of fluid removed thus far.     Possible CAP:   -Chest x-ray in emergency department showed new patchy airspace opacities  in right lower lobe suggestive of area of pneumonitis/early proximal pneumonia.  Emphysematous changes. Cardiomegaly and mild pulmonary vascular congestion.    -CT Chest w/o contrast due to CHRISSY (5/19/23) showed Unchanged bibasilar peripheral parenchymal scarring. No acute lung, airway, or pleural space abnormality. Biatrial and right ventricular heart enlargement. Dilated central pulmonary arteries.   -Blood culture x2 ngtd, procalcitonin 0.01, respiratory panel PCR negative, MRSA nares negative.   -Empiric IV zosyn/Doxy     Coronary artery disease: S/P CANDELARIA x3.  Coronary angiogram 1/24/2022 showed mild CAD with patent LAD stents in the proximal and mid LAD, left circumflex third obtuse marginal branch 30% stenosis, mid RCA lesion 20% stenosis.  LVEDP and PCWP normal.  PAP 66/24 mmHg with a mean pressure of 37 mmHg.  Shikha and TD cardiac outputs both 5.7 L/min  -On warfarin, sotalol, statin  - No anginal complaints  - Troponin T-HS 31 ->30     Medtronic dual-chamber implantable cardiac defibrillator placement on 6/11/2014 and generator change on 10/28/2022 for primary prevention of sudden cardiac death.  -device interrogation on 5/16/23 reviewed.  Defer to cardiology if any changes necessary.     History of Medtronic dual-chamber permanent pacemaker placement in 1999 with generator replacement in 2005 and device removal in 2014.     Persistent atrial fibrillation: per Dr. Garrett's last note, status post multiple electrical cardioversions, most recently on 11/19/2021. He underwent complex catheter ablation x2 in 2011. Overall fairly low burden of atrial arrhythmia based on his device interrogations. [id number] score is at least 5 (congestive heart failure, hypertension, age 75 or greater, coronary artery disease).  -warfarin, pharmacy to dose  - INR 3.67  --Sotalol dose reduced to 160mg q2hrs due to CrCl.     CKD stage III: Baseline creatinine 1.5-1.6. Cr 1.85 today.   -Cr slightly up from diuresis most  "likely.  -monitor closely  -bmp a.m.     HTN:  -Hold losartan while on IV Lasix  -Sotalol dose reduced to 160mg q24hrs due to CrCl.     HLD:  -lipitor     Chronic macrocytic anemia, acute thrombocytopenia (worse to 135 today): platelet count on 10/28/22 was 150. On 5/18/23 - 149 -> 135 today. TSH normal.    -could be Zosyn related  -check b12, folate, fibrinogen, ptt. D Dimer added on.  -cbc a.m.     Hypokalemia:  -monitor and replete per protocol     Diet: Fluid restriction 1500 ML FLUID  Advance Diet as Tolerated: Regular Diet Adult; 2 gm NA Diet  Snacks/Supplements Adult: Ensure Enlive; With Meals    DVT Prophylaxis: warfarin  Sawyer Catheter: Not present  Lines: None     Cardiac Monitoring: ACTIVE order. Indication: Acute decompensated heart failure (48 hours)  Code Status: Full Code      Clinically Significant Risk Factors        # Hypokalemia: Lowest K = 3.3 mmol/L in last 2 days, will replace as needed   # Hypocalcemia: Lowest Ca = 8.3 mg/dL in last 2 days, will monitor and replace as appropriate     # Hypoalbuminemia: Lowest albumin = 3.3 g/dL at 5/20/2023  4:27 AM, will monitor as appropriate     # Hypertension: Noted on problem list        # Obesity: Estimated body mass index is 33.56 kg/m  as calculated from the following:    Height as of this encounter: 1.905 m (6' 3\").    Weight as of this encounter: 121.8 kg (268 lb 8.3 oz)., PRESENT ON ADMISSION  # Moderate Malnutrition: based on nutrition assessment, PRESENT ON ADMISSION        Disposition Plan      Expected Discharge Date: 05/23/2023        Discharge Comments: k+ protocol          Fortino Cook DO, DO  Hospitalist Service  [facility]  Securely message with [employer] (more info)  Text page via [employer] Paging/Directory   ______________________________________________________________________    Interval History   Events overnight noted    Physical Exam   Vital Signs: Temp: 97.5  F (36.4  C) Temp src: Oral BP: 115/84 Pulse: 62   " Resp: 20 SpO2: 97 % O2 Device: High Flow Nasal Cannula (HFNC) Oxygen Delivery: 45 LPM  Weight: 268 lbs 8.32 oz    General appearance - alert, and in no distress  Eyes - sclera anicteric  Lungs - diffusely decreased, no active wheezing, currently not labored  Heart - irreg, irreg,. + peripheral edema improving.  Abdomen - soft, nontender, BS+  Neurological - alert, oriented x3  Skin - no c/c/p     Lab/imaging reviewed

## 2023-05-22 NOTE — PROGRESS NOTES
Respiratory Care Note    Patient remains on 10 lpm via oxymask with oxygen saturation in the mid 90s (patient reports using 10 lpm home oxygen). Atrovent/Xopenex nebs given as scheduled without adverse effects. Bilateral fine crackles auscultated. Increased aeration post neb treatments. HFNC was placed on standby due to nose bleed from left nostril. Patient reports chronic nose bleed. No respiratory distress at rest. Patient endorses shortness of breath with ambulation however.     Irma Oviedo, RT

## 2023-05-22 NOTE — PROGRESS NOTES
[facility] Progress Note - Hospitalist Service    Date of Admission:  5/18/2023    Assessment & Plan   HFrEF with ADHF: deemed nonischemic dilated cardiomyopathy.    -TTE (9/13/2022) LVEF 55 to 60%, moderate TI, global RV function mild-moderately reduced, RSVP approximately 73 mmHg indicative of moderate pulmonary hypertension, estimated RA pressure 15 mmHg or greater.  -TTE (5/18/23) showed LVEF 55-60%, RV mod dilated, mod decreased RVS function, mod LAD, severe RAD, 2-3+ TR, severe PHTN, ~RAP >15mmHg  -Net negative 7.7L.  -IV Lasix 80mg TID with hold parameters (Normally on 80 mg twice daily)  -Cardiology consult.  Assistance appreciated.  - Hold losartan while diuresing  -strict I/O, daily weights     Severe COPD; moderate-to-severe pulmonary hypertension; acute on chronic hypoxic respiratory failure: subjectively and objectively improved.   -off HFNC this morning to 10L O2 oxymask.   -Chest x-ray in emergency department showed new patchy airspace opacities in right lower lobe suggestive of area of pneumonitis/early proximal pneumonia.  Emphysematous changes. Cardiomegaly and mild pulmonary vascular congestion.    -CT Chest w/o contrast due to CHRISSY (5/19/23) showed Unchanged bibasilar peripheral parenchymal scarring. No acute lung, airway, or pleural space abnormality. Biatrial and right ventricular heart enlargement. Dilated central pulmonary arteries.   - hold inhalers while on scheduled nebs  -xopenex/atrovent neb q6  -prednisone 40mg every day plan taper  -Wean oxygen as able.  Goal SPO2 88-92%.  Avoid hyperoxia.  -Empiric IV zosyn/doxy although all cultures are negative.  -Pulmonology consult appreciated.  TTE with bubble study results pending.     Possible CAP:   -Chest x-ray in emergency department showed new patchy airspace opacities in right lower lobe suggestive of area of pneumonitis/early proximal pneumonia.  Emphysematous changes. Cardiomegaly and mild pulmonary  vascular congestion.    -CT Chest w/o contrast due to CHRISSY (5/19/23) showed Unchanged bibasilar peripheral parenchymal scarring. No acute lung, airway, or pleural space abnormality. Biatrial and right ventricular heart enlargement. Dilated central pulmonary arteries.   -Blood culture x2 ngtd, procalcitonin 0.01, respiratory panel PCR negative, MRSA nares negative.   -Empiric IV zosyn/Doxy     Coronary artery disease: S/P CANDELARIA x3.  Coronary angiogram 1/24/2022 showed mild CAD with patent LAD stents in the proximal and mid LAD, left circumflex third obtuse marginal branch 30% stenosis, mid RCA lesion 20% stenosis.  LVEDP and PCWP normal.  PAP 66/24 mmHg with a mean pressure of 37 mmHg.  Shikha and TD cardiac outputs both 5.7 L/min  -On warfarin, sotalol, statin  - No anginal complaints  - Troponin T-HS 31 ->30     Medtronic dual-chamber implantable cardiac defibrillator placement on 6/11/2014 and generator change on 10/28/2022 for primary prevention of sudden cardiac death.  -device interrogation on 5/16/23 reviewed.  Defer to cardiology if any changes necessary.     History of Medtronic dual-chamber permanent pacemaker placement in 1999 with generator replacement in 2005 and device removal in 2014.     Persistent atrial fibrillation;  Supratherapeutic anticoagulation- resolved.  -per Dr. Garrett's last note, status post multiple electrical cardioversions, most recently on 11/19/2021. He underwent complex catheter ablation x2 in 2011. Overall fairly low burden of atrial arrhythmia based on his device interrogations. [id number] score is at least 5 (congestive heart failure, hypertension, age 75 or greater, coronary artery disease).  -warfarin, pharmacy to dose  - INR 2.98  --Sotalol dose reduced to 160mg q2hrs due to CrCl.     CKD stage III: Baseline creatinine 1.5-1.6. Cr continues to slowly rise to 1.96 today.   -suspect diuresis related for CHRISSY  -monitor closely  -bmp a.m.     HTN:  -Hold losartan while on IV  "Lasix  -Sotalol dose reduced to 160mg q24hrs due to CrCl.     HLD:  -lipitor     Chronic macrocytic anemia, acute thrombocytopenia (worse to 135 today): platelet count on 10/28/22 was 150. On 5/18/23 - 149 -> 135 -> 123. TSH normal.  Hgb stable at 11. Baseline 10-11.  B12/Folate high. Normal ptt, fibrinogen, and D Dimer makes DIC less likely.   -could be Zosyn related  -cbc a.m.     Hypokalemia:  -monitor and replete per protocol     Diet: Fluid restriction 1500 ML FLUID  Advance Diet as Tolerated: Regular Diet Adult; 2 gm NA Diet  Snacks/Supplements Adult: Ensure Enlive; With Meals    DVT Prophylaxis: warfarin  Sawyer Catheter: Not present  Lines: None     Cardiac Monitoring: ACTIVE order. Indication: Acute decompensated heart failure (48 hours)  Code Status: Full Code      Clinically Significant Risk Factors              # Hypoalbuminemia: Lowest albumin = 3.3 g/dL at 5/20/2023  4:27 AM, will monitor as appropriate   # Thrombocytopenia: Lowest platelets = 123 in last 2 days, will monitor for bleeding   # Hypertension: Noted on problem list        # Obesity: Estimated body mass index is 33.23 kg/m  as calculated from the following:    Height as of this encounter: 1.905 m (6' 3\").    Weight as of this encounter: 120.6 kg (265 lb 14 oz).   # Moderate Malnutrition: based on nutrition assessment         Disposition Plan      Expected Discharge Date: 05/26/2023    Discharge Delays: IV Medication - consider oral or Home Infusion  Oxygen Needs - Arrange Home O2    Discharge Comments: k+ protocol          Fortino Cook DO, DO  Hospitalist Service  [facility]  Securely message with [employer] (more info)  Text page via [facility] Paging/Directory   ______________________________________________________________________    Interval History   Events overnight noted. Mild nose bleed this morning. Taken off HFNC and doing well on 10L.    Physical Exam   Vital Signs: Temp: 97.7  F (36.5  C) Temp src: Oral BP: " 126/81 Pulse: 73   Resp: 22 SpO2: 91 % O2 Device: Oxymask Oxygen Delivery: 10 LPM  Weight: 265 lbs 14 oz    General appearance - alert, and in no distress  Eyes - sclera anicteric  Lungs - diffusely decreased, no active wheezing, currently not labored  Heart - irreg, irreg,. + peripheral edema improving.  Abdomen - soft, nontender, BS+  Neurological - alert, oriented x3  Skin - no c/c/p     Lab/imaging reviewed

## 2023-05-22 NOTE — PROGRESS NOTES
"  HEART CARE CONSULTATON NOTE        Assessment/Recommendations   Assessment:   1.  Heart failure with preserved ejection fraction  2.  Persistent A-fib on sotalol, QTc interval over 600 ms.  We will reduce sotalol further to 120 mg daily.  3.  Acute on chronic hypoxic respiratory failure  4.  Severe COPD  5.  Severe pulmonary hypertension  6.  Medtronic dual-chamber ICD  7.  Chronic kidney disease stage III, with acute kidney injury  8.  Hypertension    Plan:   1.  Switch Lasix to 80 mg p.o. twice daily.  2.  Reduce sotalol to 120 mg daily, QTc over 600 ms with prolonged QRS duration (V paced)  3.  Continue atorvastatin for hyperlipidemia  4.  Continue warfarin for stroke prevention A-fib  5.  Repeat twelve-lead EKG tomorrow    Cardiology will follow     History of Present Illness/Subjective    S: Significant improvement in lower extremity edema which is now resolved.  His oxygenation requirements are decreasing.  He has diuresed over 10 pounds since admission.  Renal function shows slight worsening today.  He is nearing euvolemia on exam and nation.  Reviewed telemetry remains ventricular paced.  Twelve-lead EKG demonstrated AV paced rhythm.  QTc interval is 608.    Echo:   1. The left ventricle is normal in size. Left ventricular function is  normal.The ejection fraction is 55-60%.  2. The right ventricle is mildly dilated.Moderately decreased right  ventricular systolic function  3. The left atrium is moderate to severely dilated. The right atrium is  severely dilated.  4. A contrast injection (Bubble Study) was performed that was negative for  flow across the interatrial septum. There is no color Doppler evidence of an  atrial shunt.     Physical Examination  Review of Systems   VITALS: /81 (BP Location: Right arm)   Pulse 73   Temp 97.7  F (36.5  C) (Oral)   Resp 22   Ht 1.905 m (6' 3\")   Wt 120.6 kg (265 lb 14 oz)   SpO2 91%   BMI 33.23 kg/m    BMI: Body mass index is 33.23 kg/m .  Wt Readings " from Last 3 Encounters:   05/22/23 120.6 kg (265 lb 14 oz)   11/16/22 123.3 kg (271 lb 14.4 oz)   11/08/22 118.8 kg (262 lb)       Intake/Output Summary (Last 24 hours) at 5/22/2023 1358  Last data filed at 5/22/2023 1300  Gross per 24 hour   Intake 1985 ml   Output 4050 ml   Net -2065 ml     General Appearance:   no distress, obese body habitus   ENT/Mouth: membranes moist, no oral lesions or bleeding gums.   O2 nasal cannula   EYES:  no scleral icterus, normal conjunctivae   Neck: no carotid bruits or thyromegaly   Chest/Lungs:    Cough.  Mild wheezing.   Cardiovascular:    Regular.  Faint systolic murmur.  No pitting edema.       Extremities: no cyanosis or clubbing   Skin: no xanthelasma, warm.    Neurologic: normal  bilateral, no tremors     Psychiatric: alert and oriented x3, calm     Review Of Systems  Skin: negative  Eyes: negative  Ears/Nose/Throat: negative  Respiratory: Dyspnea near baseline.  Positive cough.  Cardiovascular: No chest pain.  Gastrointestinal: negative  Genitourinary: negative  Musculoskeletal: negative  Neurologic: negative  Psychiatric: negative  Hematologic/Lymphatic/Immunologic: negative  Endocrine: negative          Lab Results    Chemistry/lipid CBC Cardiac Enzymes/BNP/TSH/INR   Recent Labs   Lab Test 10/06/22  1240   CHOL 109   HDL 58   LDL 43   TRIG 39     Recent Labs   Lab Test 10/06/22  1240 02/15/22  0941 09/09/21  1110   LDL 43 55 54     Recent Labs   Lab Test 05/22/23  0456      POTASSIUM 3.8   CHLORIDE 105   CO2 29   *   BUN 55.7*   CR 1.96*   GFRESTIMATED 34*   AMBIKA 8.5*     Recent Labs   Lab Test 05/22/23  0456 05/21/23  0431 05/20/23  0427   CR 1.96* 1.85* 1.82*     No results for input(s): A1C in the last 77677 hours.       Recent Labs   Lab Test 05/22/23  0456   WBC 9.0   HGB 11.0*   HCT 35.6*   *   *     Recent Labs   Lab Test 05/22/23  0456 05/21/23  0431 05/20/23  0427   HGB 11.0* 11.2* 10.9*    No results for input(s): TROPONINI in the  last 08260 hours.  Recent Labs   Lab Test 05/18/23  0855 10/05/22  1002 05/17/22  1149 02/15/22  0941 02/09/22  1032 12/01/20  0958   BNP  --   --   --   --   --  265*   NTBNPI 3,644*  --   --   --   --   --    NTBNP  --  3,598* 2,976* 2,870*   < >  --     < > = values in this interval not displayed.     Recent Labs   Lab Test 05/18/23  0855   TSH 2.38     Recent Labs   Lab Test 05/22/23  0456 05/21/23  0431 05/20/23  0427   INR 2.96* 3.67* 3.71*        Medical History  Surgical History Family History Social History   Past Medical History:   Diagnosis Date     Anemia      Asthma without status asthmaticus 5/5/2021     BPH (benign prostatic hyperplasia)      Cardiomyopathy (H)      COPD, group B, by GOLD 2017 classification (H)      Coronary artery disease due to calcified coronary lesion      Dyslipidemia, goal LDL below 70      Essential hypertension      History of transfusion      Persistent atrial fibrillation (H)      Pneumonia of left lower lobe due to infectious organism 10/4/2017     Skin cancer of trunk      Status post catheter ablation of atrial fibrillation 6/7/2017    PVI 4-2011 (Cryo/PVI + roof line + CTI line) Re-do PVI 7-2011 (RFA/PVI + CFE + VIDYA + confirmed CTI line)     Ventricular tachycardia (H)      Past Surgical History:   Procedure Laterality Date     CARDIAC DEFIBRILLATOR PLACEMENT       CARDIOVERSION  07/11/2018    x20, last 2/12/15, 10/2015, 11/18/16, 6/16/17 by Lauren Foster CNP     CARDIOVERSION  07/11/2018     CARDIOVERSION  11/19/2021     COLONOSCOPY N/A 4/28/2017    Procedure: COLONOSCOPY with 2 ascending polyps and 1 transverse polyp;  Surgeon: Jose Whittington MD;  Location: [facility] GI;  Service:      CV CORONARY ANGIOGRAM N/A 9/20/2017    Procedure: Coronary Angiogram;  Surgeon: Sergio Cervantes MD;  Location: [facility] Cath Lab;  Service:      CV CORONARY ANGIOGRAM N/A 1/24/2022    Procedure: Coronary Angiogram;  Surgeon: Christi Saunders MD;  Location: [facility] CATH LAB CV      CV LEFT HEART CATH N/A 1/24/2022    Procedure: Left Heart Cath;  Surgeon: Christi Saunders MD;  Location: [facility] CV     CV RIGHT AND LEFT HEART CATH N/A 1/24/2022    Procedure: Right and Left Heart Catherization;  Surgeon: Christi Saunders MD;  Location: [facility] CV     EP ICD GENERATOR REPLACEMENT DUAL N/A 10/28/2022    Procedure: Implantable Cardioverter Defibrillator Generator Replacement Dual;  Surgeon: Elo Collins MD;  Location: [facility] CV     EP ICD INSERT       FRACTURE SURGERY Left     wrist     INGUINAL HERNIA REPAIR Left 1967    while in the [employer] in [employer] after 13 month in [place]     INSERT / REPLACE / REMOVE PACEMAKER       IR MISCELLANEOUS PROCEDURE  4/30/2014     OTHER SURGICAL HISTORY      left hand surgery---tendon repair     OH ABLATE HEART DYSRHYTHM FOCUS  04/2011    Catheter Ablation Atrial Fibrillation PVI Apr 2011 (Cryo+RF-PVI + roof line + CTI line)     OH ABLATE HEART DYSRHYTHM FOCUS  07/2011    Re-do PVI Jul 2011 (RFA-PVI + CFE + VIDYA + confirmation of CTI line)     TOTAL SHOULDER REPLACEMENT Right 03/03/2016    Dr. Abernathy of [facility]     WRIST SURGERY Left      ZZC MYERS W/O FACETEC FORAMOT/DSKC 1/2 VRT SEG, CERVICAL      Laminectomy Lumbar;  Recorded: 03/09/2012;     Family History   Problem Relation Age of Onset     Cancer Mother         leukemia     Cancer Father         bladder     Cancer Sister         breast with lung met.     Aneurysm Sister      CABG Brother      CABG Brother      Valvular heart disease Brother         valve replacement        Social History     Socioeconomic History     Marital status:      Spouse name: Not on file     Number of children: Not on file     Years of education: Not on file     Highest education level: Not on file   Occupational History     Not on file   Tobacco Use     Smoking status: Former     Packs/day: 1.00     Years: 4.00     Pack years: 4.00     Types: Cigarettes     Quit date:  1968     Years since quittin.4     Smokeless tobacco: Never   Vaping Use     Vaping status: Never Used   Substance and Sexual Activity     Alcohol use: Yes     Alcohol/week: 2.0 standard drinks of alcohol     Comment: Alcoholic Drinks/day: 1 beer per week     Drug use: No     Sexual activity: Yes     Partners: Female     Birth control/protection: Post-menopausal   Other Topics Concern     Parent/sibling w/ CABG, MI or angioplasty before 65F 55M? Not Asked   Social History Narrative    Preloaded 2013     Social Determinants of Health     Financial Resource Strain: Not on file   Food Insecurity: Not on file   Transportation Needs: Not on file   Physical Activity: Not on file   Stress: Not on file   Social Connections: Not on file   Intimate Partner Violence: Not on file   Housing Stability: Not on file         Medications  Allergies   No current outpatient medications on file.        Allergies   Allergen Reactions     Adhesive Tape Other (See Comments)     ADHESIVE TAPE; SKIN IRRITATION; Skin pulled off with foam tape       Amiodarone      ADVERSE REACTION.  Sunlight sensitivity.     Lisinopril          Ivan Godinez, DO

## 2023-05-22 NOTE — PLAN OF CARE
Goal Outcome Evaluation:                      Heart Failure Care Map  GOALS TO BE MET BEFORE DISCHARGE:    1. Decrease congestion and/or edema with diuretic therapy to achieve near optimal volume status.     Dyspnea improved: No, further care required to meet this goal. Please explain desats with activity   Edema improved: Yes, satisfactory for discharge.        Last 24 hour I/O:   Intake/Output Summary (Last 24 hours) at 5/22/2023 0656  Last data filed at 5/22/2023 0651  Gross per 24 hour   Intake 1765 ml   Output 4050 ml   Net -2285 ml           Net I/O and Weights since admission:   04/22 0700 - 05/22 0659  In: 5603 [P.O.:4800; I.V.:803]  Out: 81874 [Urine:97190]  Net: -7497     Vitals:    05/18/23 0839 05/18/23 1815 05/19/23 0412 05/20/23 0348   Weight: 130.6 kg (288 lb) 122 kg (268 lb 15.4 oz) 123.5 kg (272 lb 4.3 oz) 121.9 kg (268 lb 11.9 oz)    05/21/23 0335 05/22/23 0616   Weight: 121.8 kg (268 lb 8.3 oz) 120.6 kg (265 lb 14 oz)       2.  O2 sats > 90% on room air, or at prior home O2 therapy level.      Able to wean O2 this shift to keep sats above 90%?: No, further care required to meet this goal. Please explain requires hi iram nasal cannula   Does patient use Home O2? Yes-            Current oxygenation status:   SpO2: 94 %     O2 Device: High Flow Nasal Cannula (HFNC), Oxygen Delivery: 45 LPM    3.  Tolerates ambulation and mobility near baseline.     Ambulation: No, further care required to meet this goal. Please explain patient slept in bed over night   Times patient ambulated with staff this shift: 0    Please review the Heart Failure Care Map for additional HF goal outcomes.    Ann Carrion RN  5/22/2023        Patient alert and oriented.  No chest pain, denies shortness of breath.  Continues to desaturate with minimal activity.  Patient was encouraged to take slow deep breaths while turning in bed.  IV lasix given, external catheter in place, needed to be changed, though was able to measure  most output.  Will continue to monitor.

## 2023-05-22 NOTE — PLAN OF CARE
Problem: Heart Failure  Goal: Stable Heart Rate and Rhythm  Outcome: Progressing  Goal: Optimal Functional Ability  Intervention: Optimize Functional Ability  Recent Flowsheet Documentation  Taken 5/22/2023 0900 by Lucia Ellsworth, RN  Activity Management: activity adjusted per tolerance  Goal: Effective Oxygenation and Ventilation  Outcome: Progressing  Intervention: Promote Airway Secretion Clearance  Recent Flowsheet Documentation  Taken 5/22/2023 0900 by Lucia Ellsworth, RN  Activity Management: activity adjusted per tolerance   Goal Outcome Evaluation:    Pt had a small bloody nose. Gave pt nasal spray.  Switched to Oxymask 10L.  Used BSC for BM.  Primofit 850 ml. Intake 520 ml.  Has thrush on the roof of mouth.gave nystatin.  Afib BBB.  Telemetry shows groups of pacer spikes in short bursts that are artifacts per monitor tech. RT wants to put on pseudo high flow NC at 1400.

## 2023-05-22 NOTE — PLAN OF CARE
Heart Failure Care Map  GOALS TO BE MET BEFORE DISCHARGE:    1. Decrease congestion and/or edema with diuretic therapy to achieve near optimal volume status.     Dyspnea improved: No, further care required to meet this goal. Please explain   Desats with activity and when speaking, eating.   Edema improved: Yes, satisfactory for discharge.        Last 24 hour I/O:   Intake/Output Summary (Last 24 hours) at 5/21/2023 2235  Last data filed at 5/21/2023 2210  Gross per 24 hour   Intake 1765 ml   Output 3800 ml   Net -2035 ml           Net I/O and Weights since admission:   04/21 2300 - 05/21 2259  In: 5403 [P.O.:4800; I.V.:603]  Out: 67345 [Urine:47045]  Net: -5747     Vitals:    05/18/23 0839 05/18/23 1815 05/19/23 0412 05/20/23 0348   Weight: 130.6 kg (288 lb) 122 kg (268 lb 15.4 oz) 123.5 kg (272 lb 4.3 oz) 121.9 kg (268 lb 11.9 oz)    05/21/23 0335   Weight: 121.8 kg (268 lb 8.3 oz)       2.  O2 sats > 90% on room air, or at prior home O2 therapy level.      Able to wean O2 this shift to keep sats above 90%?: No, further care required to meet this goal. Please explain On Hi Flow 45 L and 50% FIO2   Does patient use Home O2? Yes-  On 10 L at home          Current oxygenation status:   SpO2: 92 %     O2 Device: High Flow Nasal Cannula (HFNC), Oxygen Delivery: 45 LPM    3.  Tolerates ambulation and mobility near baseline.     Ambulation: Yes, satisfactory for discharge.   Times patient ambulated with staff this shift: Pt reports being independent at home. Stable gait transferring from chair to bed and back.    Please review the Heart Failure Care Map for additional HF goal outcomes.    Amrita Oviedo RN  5/21/2023    Problem: Heart Failure  Goal: Stable Heart Rate and Rhythm  Outcome: Progressing     Problem: Heart Failure  Goal: Effective Oxygenation and Ventilation  Outcome: Progressing  Intervention: Promote Airway Secretion Clearance  Recent Flowsheet Documentation  Taken 5/21/2023 2130 by Amrita Oviedo,  RN  Activity Management: activity adjusted per tolerance  Taken 5/21/2023 1615 by Amrita Oviedo, RN  Activity Management: activity adjusted per tolerance   Goal Outcome Evaluation:    VSS, Afib rate controlled, on Hi Flow 45 L and 50% FIO2. Denied pain. Wife was at bedside today. Pleasant, able to make needs known.

## 2023-05-22 NOTE — PROGRESS NOTES
PULMONARY PROGRESS NOTE    Date / Time of Admission:  5/18/2023  8:33 AM  Assessment:   78yoM with history of COPD in addition to environmental exposures (fire, agent orange) with WHO III pulmonary hypertension here with RV failure and volume overload causing worsening hypoxia. Agree that COPD is less of a  of his hypoxia than his pulmonary hypertension.     Principal Problem:    Acute on chronic congestive heart failure, unspecified heart failure type (H)      Advance Directives:  Full Code--consider palliative care consult to discuss this because should he be intubated would be unlikely to extubate.     Plan:   Taper prednisone given new thrush.  Agree with holding Breo  Nystatin S&S  Diuresis as able, oxygenation has improved with diuresis.      Subjective:   HPI:  Buck Sinclair is a 78 year old male with chronic hypoxic respiratory failure on 10 L/min NC since December 2022, COPD GOLD B, history of environmental exposures (Agent Orange, dust/fire inhalation), very remote tobacco use (quit 1968), coronary artery disease s/p PCI/stents, atrial fibrillation s/p PVL with ICD/PPM on chronic anticoagulation with warfarin, mild-moderate RV dysfunction, WHO Group III pulmonary hypertension (mPAP 37 mmHg 01/2022), history of LLL pneumonia and cavitary lesion 10/2017, recurrent LLL pneumonia that subsequently resolved who presented to [facility] ED on 5/18/[facility] from Pulmonary [facility] with hypoxia/cyanosis (O2 sat 70s%), admitted for acute on chronic hypoxic respiratory failure and heart failure exacerbation.    Since admission he was placed on high-flow oxygen but has been weaned back to 10L with the aid of diuresis.  Unfortunately he has also developed thrush.         Allergies:   Allergies   Allergen Reactions     Adhesive Tape Other (See Comments)     ADHESIVE TAPE; SKIN IRRITATION; Skin pulled off with foam tape       Amiodarone      ADVERSE REACTION.  Sunlight sensitivity.     Lisinopril      "    MEDS:  Scheduled Meds:    atorvastatin  40 mg Oral QPM     doxycycline  100 mg Intravenous Q12H     FLUoxetine  20 mg Oral Daily     [Held by provider] fluticasone-vilanterol  1 puff Inhalation Daily     furosemide  80 mg Oral BID     ipratropium  0.5 mg Nebulization 3 times daily     levalbuterol  1.25 mg Nebulization 3 times daily     [Held by provider] losartan  25 mg Oral Daily     nystatin  1,000,000 Units Swish & Swallow 4x Daily     oxymetazoline  2 spray Both Nostrils BID     pantoprazole  40 mg Oral QAM AC     piperacillin-tazobactam  3.375 g Intravenous Q8H     polyethylene glycol  17 g Oral Daily     predniSONE  40 mg Oral Daily     senna-docusate  2 tablet Oral BID     sodium chloride  1 spray Both Nostrils 4x Daily     sodium chloride (PF)  3 mL Intracatheter Q8H     [START ON 5/23/2023] sotalol  120 mg Oral Q24H     [Held by provider] umeclidinium  1 puff Inhalation Daily     warfarin ANTICOAGULANT  0.5 mg Oral ONCE at 18:00     Warfarin Therapy Reminder  1 each Oral See Admin Instructions     Continuous Infusions:    Continuing ACE inhibitor/ARB/ARNI from home medication list OR ACE inhibitor/ARB order already placed during this visit       - MEDICATION INSTRUCTIONS -       PRN Meds:.acetaminophen, albuterol, bisacodyl, Continuing ACE inhibitor/ARB/ARNI from home medication list OR ACE inhibitor/ARB order already placed during this visit, - MEDICATION INSTRUCTIONS -, lidocaine 4%, lidocaine (buffered or not buffered), melatonin, ondansetron **OR** ondansetron, sodium chloride (PF)    Objective:   VITALS:  BP (!) 125/91 (BP Location: Right arm)   Pulse 64   Temp 97.8  F (36.6  C) (Oral)   Resp 22   Ht 1.905 m (6' 3\")   Wt 120.6 kg (265 lb 14 oz)   SpO2 90%   BMI 33.23 kg/m    EXAM:    GENERAL APPEARANCE: Alert, no acute distress  HENT: white patches on roof of mouth, tongue  NECK: No adenopathy,masses or thyromegaly  RESP: decreased breath sounds.   CV: regular rate and rhythm, no murmur, " rub or gallop  ABDOMEN: normal bowel sounds, soft, nontender, no hepatosplenomegaly or other masses  LYMPHATICS: No cervical, or supraclavicular adenopathy  SKIN: no suspicious lesions or rashes  NEURO: Alert, oriented x 3, speech and mentation normal        I&O:    Date 05/22/23 0700 - 05/23/23 0659   Shift 9266-9555 1449-0992 6795-4894 24 Hour Total   INTAKE   P.O. 520   520   Shift Total(mL/kg) 520(4.31)   520(4.31)   OUTPUT   Urine 1400   1400   Shift Total(mL/kg) 1400(11.61)   1400(11.61)   Weight (kg) 120.6 120.6 120.6 120.6       Data Review:    Imaging: personally reviewed  Chest   EXAM: CT CHEST W/O CONTRAST  LOCATION: [facility]  DATE/TIME: 5/19/2023 10:38 AM CDT     INDICATION: dyspnea, hypoxia, CHF, possible pneumonia  COMPARISON: CT of the chest 08/17/2022  TECHNIQUE: CT chest without IV contrast. Multiplanar reformats were obtained. Dose reduction techniques were used.  CONTRAST: None.     FINDINGS:   LUNGS AND PLEURA: Unchanged parenchymal heterogeneity relatively lucent apices and hazy groundglass attenuation within the bases. There are areas of subpleural architectural distortion and opacity in both bases consistent with cicatrization atelectasis.   Such opacity in the medial right lower lobe is associated with crowding of bronchovascular structures and a few internal air bronchograms. These findings are unchanged. No new airspace opacities or interlobular septal thickening. Trachea and central   airways are patent and normal caliber. No endoluminal airway material.     MEDIASTINUM: Right subclavian approach dual chamber pacemaker has right atrial appendage and right ventricular leads. Severe enlargement of the right atrium. Mild enlargement of the left atrium. There are left atrial wall calcifications, likely sequela   of prior ablation procedure. Dilated main and central pulmonary arteries. Mild calcification of aortic and mitral annuli. No pericardial effusion or  thickening. Hilar and mediastinal lymph nodes are normal by size criteria. Esophagus is decompressed.     CORONARY ARTERY CALCIFICATION: Severe.     UPPER ABDOMEN: Symmetric renal atrophy.     MUSCULOSKELETAL: Unchanged thoracic spine degenerative osteophytes from T3 through the thoracolumbar junction. No vertebral compression deformity. There is a right shoulder joint replacement.                                                                      IMPRESSION:      1.  Unchanged bibasilar peripheral parenchymal scarring. No acute lung, airway, or pleural space abnormality.  2.  Biatrial and right ventricular heart enlargement. Dilated central pulmonary arteries. Findings are consistent with acute pulmonary hypertension. Calcification in the walls of the left atrium likely from prior ablation procedure.      Results for orders placed or performed during the hospital encounter of 05/18/23   Basic metabolic panel   Result Value Ref Range    Sodium 145 136 - 145 mmol/L    Potassium 3.8 3.4 - 5.3 mmol/L    Chloride 105 98 - 107 mmol/L    Carbon Dioxide (CO2) 29 22 - 29 mmol/L    Anion Gap 11 7 - 15 mmol/L    Urea Nitrogen 55.7 (H) 8.0 - 23.0 mg/dL    Creatinine 1.96 (H) 0.67 - 1.17 mg/dL    Calcium 8.5 (L) 8.8 - 10.2 mg/dL    Glucose 144 (H) 70 - 99 mg/dL    GFR Estimate 34 (L) >60 mL/min/1.73m2     Lab Results   Component Value Date    WBC 9.0 05/22/2023    HGB 11.0 (L) 05/22/2023    HCT 35.6 (L) 05/22/2023     (H) 05/22/2023     (L) 05/22/2023     Last Arterial Blood Gas:  Recent Labs   Lab 05/18/23  0851   PH 7.48*   PCO2 31*   PO2 119*   HCO3 23

## 2023-05-22 NOTE — PROGRESS NOTES
FIO2 not titrated. Patient remains on high flow 45%/50 FIO2; sats low 90s. Nebs given as ordered. BS diminished/crackles. RT following.    Ayaan Power, RT

## 2023-05-22 NOTE — TREATMENT PLAN
RCAT Treatment Plan    Patient Score: 12  Patient Acuity: 3    Clinical Indication for Therapy: atelectasis, COPD    Therapy Ordered: albuterol HFA PRN, atrovent/xopenex TID    Assessment Summary: Patient is a former smoker with COPD and pulmonary HTN using home inhalers and home oxygen. CXR indicates RLL pneumonitis/bronchopenumonia. Inhalers are on hold while on steroid(s). Will continue scheduled nebs per RCAT guideline. Titrate oxygen to keep oxygen saturation >90%. RT to re-evaluate within 72 hours.     Irma Oviedo, RT  5/22/2023

## 2023-05-22 NOTE — PROGRESS NOTES
Care Management Follow Up    Length of Stay (days): 4    Expected Discharge Date: 05/26/2023     Concerns to be Addressed: medical progression; discharge planning  Patient plan of care discussed at interdisciplinary rounds: Yes     Anticipated Discharge Disposition:  Home with home care     Anticipated Discharge Services:   Home with home care  Anticipated Discharge DME:  TBD     Patient/family educated on Medicare website which has current facility and service quality ratings:  NA  Education Provided on the Discharge Plan:  Per team  Patient/Family in Agreement with the Plan:  yes     Referrals Placed by CM/SW:  Home care  Private pay costs discussed: TBD    Additional Information:  12:19 PM  JUDIE received a phone call from Josy [phone] from [facility] who reported the agency accepted Pt for PT/OT/RN/HHA services. Josy requested an update regarding Pt's LACEY so she can put him on the schedule. JUDIE reported that CM was updated that it would be several days before Pt is ready to discharge. JUDIE reported that CM will keep the agency updated as Pt progresses closer to discharge.     Social History:  Pt lives with his spouse in a single family home.  Pt needs assistance with most I/ADLS, His wife assists with these.  His wife also provides transportation and can transport him at discharge.    VEE Johnston

## 2023-05-23 NOTE — PROGRESS NOTES
[facility] Progress Note - Hospitalist Service    Date of Admission:  5/18/2023    Assessment & Plan   HFrEF with ADHF: deemed nonischemic dilated cardiomyopathy.    -TTE (9/13/2022) LVEF 55 to 60%, moderate TI, global RV function mild-moderately reduced, RSVP approximately 73 mmHg indicative of moderate pulmonary hypertension, estimated RA pressure 15 mmHg or greater.  -TTE (5/18/23) showed LVEF 55-60%, RV mod dilated, mod decreased RVS function, mod LAD, severe RAD, 2-3+ TR, severe PHTN, ~RAP >15mmHg  -Net negative 8.7L.  -IV Lasix 80mg TID discontinued 5/22  -Bumex 3mg PO BID started on 5/23  -Cardiology ssistance appreciated.  -Restart losartan pending continued renal recovery  -strict I/O, daily weights     Acute on chronic hypoxic respiratory failure  Severe COPD   Moderate-to-severe pulmonary hypertension  -has been on oxygen chronically and for past several months was using 10L PNC?  -Acute decline due to volume overload in the setting of poor cardiopulmonary status chronically.  -required initially BiPAP on admission followed by HFNC and weaned to 8L on 5/22.  Increased overnight by nursing to 10L. Stable.   -Chest x-ray in emergency department showed new patchy airspace opacities in right lower lobe suggestive of area of pneumonitis/early proximal pneumonia.  Emphysematous changes. Cardiomegaly and mild pulmonary vascular congestion.    -CT Chest w/o contrast due to CHRISSY (5/19/23) showed Unchanged bibasilar peripheral parenchymal scarring. No acute lung, airway, or pleural space abnormality. Biatrial and right ventricular heart enlargement. Dilated central pulmonary arteries.   -Repeat limited TTE with bubble study performed on 5/21/23 which was negative for interatrial shunt.  - hold Breo-Ellipta and Incruse Ellipta while on scheduled xopenex/atrovent nebs TID  -prednisone taper as ordered  -Pulmonology assitanceappreciated.       Possible CAP:   -Chest x-ray in  emergency department showed new patchy airspace opacities in right lower lobe suggestive of area of pneumonitis/early proximal pneumonia.  Emphysematous changes. Cardiomegaly and mild pulmonary vascular congestion.    -CT Chest w/o contrast due to CHRISSY (5/19/23) showed Unchanged bibasilar peripheral parenchymal scarring. No acute lung, airway, or pleural space abnormality. Biatrial and right ventricular heart enlargement. Dilated central pulmonary arteries.   -Blood culture x2 ngtd, procalcitonin 0.01, respiratory panel PCR negative, MRSA nares negative.   -Empiric IV zosyn/Doxy stopped 5/23  -Augmentin/Doxy x 5 days starting today     Coronary artery disease: S/P CANDELARIA x3.  Coronary angiogram 1/24/2022 showed mild CAD with patent LAD stents in the proximal and mid LAD, left circumflex third obtuse marginal branch 30% stenosis, mid RCA lesion 20% stenosis.  LVEDP and PCWP normal.  PAP 66/24 mmHg with a mean pressure of 37 mmHg.  Shikha and TD cardiac outputs both 5.7 L/min.  -On warfarin, sotalol, statin  -start ASA 81mg qd  - No anginal complaints  - Troponin T-HS 31 ->30     Medtronic dual-chamber implantable cardiac defibrillator: -Indication:  primary prevention of sudden cardiac death.  -placed on 6/11/2014 with generator change on 10/28/2022  -device interrogation on 5/16/23 reviewed.    -Defer to cardiology if any changes necessary.    Persistent atrial fibrillation;  Supratherapeutic anticoagulation- resolved.  -per Dr. Garrett's last note, status post multiple electrical cardioversions, most recently on 11/19/2021. He underwent complex catheter ablation x2 in 2011. Overall fairly low burden of atrial arrhythmia based on his device interrogations. [id number] score is at least 5 (congestive heart failure, hypertension, age 75 or greater, coronary artery disease).  -warfarin, pharmacy to dose  - INR 2.44 today  --Sotalol dose reduced to 120mg q2hrs due to CrCl.     CHRISSY on CKD stage III: CHRISSY due to IV diuresis with  "net negative fluid loss of .  Admission Cr 1.64 carlo to peak of 1.96 (5/22) -> 1.88 today after IV diuresis was stopped on 5/22.  Baseline creatinine 1.5-1.6.   -monitor on Bumex 3mg PO BID started today  -hold Losartan until renal recovery near baseline then resume  -Cr, K in a.m.     HTN:  -Hold losartan with resolving CHRISSY then resume  -Sotalol dose reduced to 120mg q24hrs due to CrCl.    Oral Candidiasis:  -Nystatin Sw/Sw x 5 days     HLD:  -lipitor     Chronic macrocytic anemia, acute thrombocytopenia:  Platelets 139. Dale 123 (5/22/23) TSH normal.  Hgb stable at ~11. Baseline 10-11.  B12/Folate high. Normal ptt, fibrinogen, and D Dimer makes DIC less likely.     Hypokalemia:  -monitor and replete per protocol    Goals of Care: Full Code. Severe Cardiopulmonary disease.     Diet: Fluid restriction 1500 ML FLUID  Advance Diet as Tolerated: Regular Diet Adult; 2 gm NA Diet    DVT Prophylaxis: warfarin  Sawyer Catheter: Not present  Lines: None     Cardiac Monitoring: ACTIVE order. Indication: Acute decompensated heart failure (48 hours)  Code Status: Full Code      Clinically Significant Risk Factors              # Hypoalbuminemia: Lowest albumin = 3.3 g/dL at 5/20/2023  4:27 AM, will monitor as appropriate   # Thrombocytopenia: Lowest platelets = 123 in last 2 days, will monitor for bleeding   # Hypertension: Noted on problem list        # Obesity: Estimated body mass index is 33.16 kg/m  as calculated from the following:    Height as of this encounter: 1.905 m (6' 3\").    Weight as of this encounter: 120.3 kg (265 lb 4.8 oz).   # Moderate Malnutrition: based on nutrition assessment         Disposition Plan      discharge 1-2 days pending clinical status and PT/OT recommendations for home vs TCU    Fortino Cook DO, DO  Hospitalist Service  [facility]  Securely message with [employer] (more info)  Text page via [employer] Paging/Directory "   ______________________________________________________________________    Interval History   Stable night    Physical Exam   Vital Signs: Temp: 97.5  F (36.4  C) Temp src: Oral BP: 115/76 Pulse: 77   Resp: 20 SpO2: 96 % O2 Device: Nasal cannula with humidification Oxygen Delivery: 10 LPM  Weight: 265 lbs 4.8 oz    General appearance - alert, and in no distress  Eyes - sclera anicteric  Lungs - diffusely decreased, no active wheezing, not labored  Heart - irreg, irreg,. trace peripheral edema improving.  Abdomen - soft, nontender, BS+  Neurological - alert, oriented x3  Skin - no c/c/p     Lab/imaging reviewed

## 2023-05-23 NOTE — PLAN OF CARE
Problem: Heart Failure  Goal: Effective Oxygenation and Ventilation  Outcome: Progressing   Goal Outcome Evaluation:    Able to sleep well overnight. Comfortable at 10 liters/ HF-92 to 95%  Denies any pain or discomfort.

## 2023-05-23 NOTE — PLAN OF CARE
Heart Failure Care Map  GOALS TO BE MET BEFORE DISCHARGE:    1. Decrease congestion and/or edema with diuretic therapy to achieve near optimal volume status.     Dyspnea improved: No, further care required to meet this goal. Please explain pt requiring 02.   Edema improved: No, further care required to meet this goal. Please explain BLE edema present.        Last 24 hour I/O:   Intake/Output Summary (Last 24 hours) at 5/23/2023 1538  Last data filed at 5/23/2023 1213  Gross per 24 hour   Intake 1372 ml   Output 2000 ml   Net -628 ml           Net I/O and Weights since admission:   04/23 2300 - 05/23 2259  In: 7735 [P.O.:6932; I.V.:803]  Out: 89911 [Urine:34365]  Net: -8765     Vitals:    05/18/23 0839 05/18/23 1815 05/19/23 0412 05/20/23 0348   Weight: 130.6 kg (288 lb) 122 kg (268 lb 15.4 oz) 123.5 kg (272 lb 4.3 oz) 121.9 kg (268 lb 11.9 oz)    05/21/23 0335 05/22/23 0616 05/23/23 0653   Weight: 121.8 kg (268 lb 8.3 oz) 120.6 kg (265 lb 14 oz) 120.3 kg (265 lb 4.8 oz)       2.  O2 sats > 90% on room air, or at prior home O2 therapy level.      Able to wean O2 this shift to keep sats above 90%?: No, further care required to meet this goal. Please explain 10-11L 02.    Does patient use Home O2? Yes-  pt states he uses up to 10L at home.          Current oxygenation status:   SpO2: 96 %     O2 Device: Nasal cannula with humidification, Oxygen Delivery: 10 LPM    3.  Tolerates ambulation and mobility near baseline.     Ambulation: No, further care required to meet this goal. Please explain Pt refused to ambulate this shift.   Times patient ambulated with staff this shift: 0    Please review the Heart Failure Care Map for additional HF goal outcomes.    Pt is alert and oriented. Denied pain and SOB at rest. Up with an asist of 1. Pt in chair x1 this shift. 02 remained at 10-11L NC this shift, sating in the high 80s-low 90s. EKG done this shift. Compliant with fluid restriction.     Flavia Elizondo RN  5/23/2023

## 2023-05-23 NOTE — PROGRESS NOTES
HEART CARE NOTE          Assessment/Recommendations     1. RV dysfunction/HFimpEF  Assessment / Plan  Nearing euvolemia; tolerating oral diuretics - will switch to bumex given better bioavailability/pharmacokinetics and continue to monitor      2. CAD  Assessment / Plan  S/p CANDELARIA x3; denies chest pain or anginal equivalent   Continue ASA unless otherwise contraindicated     3. Atrial fibrillation  Assessment / Plan  S/p ablation x2; s/p ICD  Currently on sotalol - reduced 2/2 prolonged QTc; Coumadin management per pharmacy     4. CHRISSY on CKD   Assessment / Plan  Continue to monitor closely with diuresis     5. Moderate-severe Pulmonary HTN  Assessment / Plan  Primarily WHO group 3 - follows with Dr. Payton in pulmonary clinic and Dr. Morales; inpatient pulmonary team following along during this admission    History of Present Illness/Subjective      Mr. Buck Sinclair is a 78 year old male with a PMHx significant for (per Dr. Garrett's note)  HFrEF/RV dysfunction due to nonischemic cardiomyopathy (out of proportion to CAD) with LVEF most recently noted to be 50%, persistent AF s/p multiple electrical cardioversions and extensive catheter ablations x2 in 2011, CAD s/p CANDELARIA x3 to the [id number] LAD, COPD on home O2, Medtronic dual-chamber permanent pacemaker placement in 1999 replaced with a Medtronic dual-chamber implantable cardiac defibrillator on 6/11/2014, PHTN, HTN, and HLD presenting in acute hypoxic respiratory failure in the setting of ADHF     Today, Mr. Bone denies any acute cardiac events or complaints; Management plan as detailed above     ECG: Personally reviewed. Paced rhythm     ECHO (personnaly Reviewed): repeat study pending   Left ventricular size, wall motion and function are normal. The ejection  fraction is 55-60%.  Global right ventricular function is mildly to moderately reduced.  Moderate tricuspid insufficiency is present.  RVSP is estimated 73mmHg.Moderate pulmonary hypertension  "is present.  IVC diameter >2.1 cm collapsing <50% with sniff suggests a high RA pressure  estimated at 15 mmHg or greater.     When compared with prior study of 12/08/2021, filling pressures/PASP are  higher now.          Physical Examination Review of Systems   /85 (BP Location: Right arm)   Pulse 68   Temp 97.7  F (36.5  C) (Oral)   Resp 19   Ht 1.905 m (6' 3\")   Wt 120.3 kg (265 lb 4.8 oz)   SpO2 95%   BMI 33.16 kg/m    Body mass index is 33.16 kg/m .  Wt Readings from Last 3 Encounters:   05/23/23 120.3 kg (265 lb 4.8 oz)   11/16/22 123.3 kg (271 lb 14.4 oz)   11/08/22 118.8 kg (262 lb)     General Appearance:   no distress, normal body habitus   ENT/Mouth: membranes moist, no oral lesions or bleeding gums.      EYES:  no scleral icterus, normal conjunctivae   Neck: no carotid bruits or thyromegaly   Chest/Lungs:   lungs are clear to auscultation, no rales or wheezing, equal chest wall expansion    Cardiovascular:   Regular. Normal first and second heart sounds with no murmurs, rubs, or gallops; the carotid, radial and posterior tibial pulses are intact, no JVD and trace-mild LE edema bilaterally    Abdomen:  no organomegaly, masses, bruits, or tenderness; bowel sounds are present   Extremities: no cyanosis or clubbing   Skin: no xanthelasma, warm.    Neurologic: alert and oriented x3, calm     Psychiatric: alert and oriented x3, calm     A complete 10 systems ROS was reviewed  And is negative except what is listed in the HPI.          Medical History  Surgical History Family History Social History   Past Medical History:   Diagnosis Date     Anemia      Asthma without status asthmaticus 5/5/2021     BPH (benign prostatic hyperplasia)      Cardiomyopathy (H)      COPD, group B, by GOLD 2017 classification (H)      Coronary artery disease due to calcified coronary lesion      Dyslipidemia, goal LDL below 70      Essential hypertension      History of transfusion      Persistent atrial fibrillation " (H)      Pneumonia of left lower lobe due to infectious organism 10/4/2017     Skin cancer of trunk      Status post catheter ablation of atrial fibrillation 6/7/2017    PVI 4-2011 (Cryo/PVI + roof line + CTI line) Re-do PVI 7-2011 (RFA/PVI + CFE + VIDYA + confirmed CTI line)     Ventricular tachycardia (H)     Past Surgical History:   Procedure Laterality Date     CARDIAC DEFIBRILLATOR PLACEMENT       CARDIOVERSION  07/11/2018    x20, last 2/12/15, 10/2015, 11/18/16, 6/16/17 by Lauren Foster CNP     CARDIOVERSION  07/11/2018     CARDIOVERSION  11/19/2021     COLONOSCOPY N/A 4/28/2017    Procedure: COLONOSCOPY with 2 ascending polyps and 1 transverse polyp;  Surgeon: Jose Whittington MD;  Location[facility];  Service:      CV CORONARY ANGIOGRAM N/A 9/20/2017    Procedure: Coronary Angiogram;  Surgeon: Sergio Cervantes MD;  Location: [facility] Cath Lab;  Service:      CV CORONARY ANGIOGRAM N/A 1/24/2022    Procedure: Coronary Angiogram;  Surgeon: Christi Saunders MD;  Location: [facility] CATH LAB CV     CV LEFT HEART CATH N/A 1/24/2022    Procedure: Left Heart Cath;  Surgeon: Christi Saunders MD;  Location: [facility] CATH LAB CV     CV RIGHT AND LEFT HEART CATH N/A 1/24/2022    Procedure: Right and Left Heart Catherization;  Surgeon: Christi Saunders MD;  Location: [facility] CV     EP ICD GENERATOR REPLACEMENT DUAL N/A 10/28/2022    Procedure: Implantable Cardioverter Defibrillator Generator Replacement Dual;  Surgeon: Elo Collins MD;  Location: [facility] CATH LAB CV     EP ICD INSERT       FRACTURE SURGERY Left     wrist     INGUINAL HERNIA REPAIR Left 1967    while in the [employer] in [place] after 13 month in [place]     INSERT / REPLACE / REMOVE PACEMAKER       IR MISCELLANEOUS PROCEDURE  4/30/2014     OTHER SURGICAL HISTORY      left hand surgery---tendon repair     [place] ABLATE HEART DYSRHYTHM FOCUS  04/2011    Catheter Ablation Atrial Fibrillation PVI Apr 2011 (Cryo+RF-PVI + roof line + CTI line)      [place] ABLATE HEART DYSRHYTHM FOCUS  2011    Re-do PVI 2011 (RFA-PVI + CFE + VIDYA + confirmation of CTI line)     TOTAL SHOULDER REPLACEMENT Right 2016    Dr. Abernathy of [facility]     WRIST SURGERY Left      ZZC MYERS W/O FACETEC FORAMOT/DSKC  VRT SEG, CERVICAL      Laminectomy Lumbar;  Recorded: 2012;    no family history of premature coronary artery disease Social History     Socioeconomic History     Marital status:      Spouse name: Not on file     Number of children: Not on file     Years of education: Not on file     Highest education level: Not on file   Occupational History     Not on file   Tobacco Use     Smoking status: Former     Packs/day: 1.00     Years: 4.00     Pack years: 4.00     Types: Cigarettes     Quit date: 1968     Years since quittin.4     Smokeless tobacco: Never   Vaping Use     Vaping status: Never Used   Substance and Sexual Activity     Alcohol use: Yes     Alcohol/week: 2.0 standard drinks of alcohol     Comment: Alcoholic Drinks/day: 1 beer per week     Drug use: No     Sexual activity: Yes     Partners: Female     Birth control/protection: Post-menopausal   Other Topics Concern     Parent/sibling w/ CABG, MI or angioplasty before 65F 55M? Not Asked   Social History Narrative    Preloaded 2013     Social Determinants of Health     Financial Resource Strain: Not on file   Food Insecurity: Not on file   Transportation Needs: Not on file   Physical Activity: Not on file   Stress: Not on file   Social Connections: Not on file   Intimate Partner Violence: Not on file   Housing Stability: Not on file           Lab Results    Chemistry/lipid CBC Cardiac Enzymes/BNP/TSH/INR   Lab Results   Component Value Date    CHOL 109 10/06/2022    HDL 58 10/06/2022    TRIG 39 10/06/2022    BUN 53.9 (H) 2023     2023    CO2 30 (H) 2023    Lab Results   Component Value Date    WBC 9.0 2023    HGB 11.8 (L) 2023    HCT  38.0 (L) 05/23/2023     (H) 05/23/2023     (L) 05/23/2023    Lab Results   Component Value Date     (H) 12/01/2020    TSH 2.38 05/18/2023    INR 2.44 (H) 05/23/2023     No results found for: CKTOTAL, CKMB, TROPONINI       Weight:    Wt Readings from Last 3 Encounters:   05/23/23 120.3 kg (265 lb 4.8 oz)   11/16/22 123.3 kg (271 lb 14.4 oz)   11/08/22 118.8 kg (262 lb)       Allergies  Allergies   Allergen Reactions     Adhesive Tape Other (See Comments)     ADHESIVE TAPE; SKIN IRRITATION; Skin pulled off with foam tape       Amiodarone      ADVERSE REACTION.  Sunlight sensitivity.     Lisinopril          Surgical History  Past Surgical History:   Procedure Laterality Date     CARDIAC DEFIBRILLATOR PLACEMENT       CARDIOVERSION  07/11/2018    x20, last 2/12/15, 10/2015, 11/18/16, 6/16/17 by Lauren Foster CNP     CARDIOVERSION  07/11/2018     CARDIOVERSION  11/19/2021     COLONOSCOPY N/A 4/28/2017    Procedure: COLONOSCOPY with 2 ascending polyps and 1 transverse polyp;  Surgeon: Jose Whittington MD;  Location[facility];  Service:      CV CORONARY ANGIOGRAM N/A 9/20/2017    Procedure: Coronary Angiogram;  Surgeon: Sergio Cervantes MD;  Location: [facility] Cath Lab;  Service:      CV CORONARY ANGIOGRAM N/A 1/24/2022    Procedure: Coronary Angiogram;  Surgeon: Christi Saunders MD;  Location: [facility] CV     CV LEFT HEART CATH N/A 1/24/2022    Procedure: Left Heart Cath;  Surgeon: Christi Saunders MD;  Location: [facility] CATH LAB CV     CV RIGHT AND LEFT HEART CATH N/A 1/24/2022    Procedure: Right and Left Heart Catherization;  Surgeon: Christi Saunders MD;  Location: [facility] CV     EP ICD GENERATOR REPLACEMENT DUAL N/A 10/28/2022    Procedure: Implantable Cardioverter Defibrillator Generator Replacement Dual;  Surgeon: Elo Collins MD;  Location: [facility] CV     EP ICD INSERT       FRACTURE SURGERY Left     wrist     INGUINAL HERNIA REPAIR Left 1967     while in the [employer] in [place] after 13 month in [place]     INSERT / REPLACE / REMOVE PACEMAKER       IR MISCELLANEOUS PROCEDURE  4/30/2014     OTHER SURGICAL HISTORY      left hand surgery---tendon repair     [place] ABLATE HEART DYSRHYTHM FOCUS  04/2011    Catheter Ablation Atrial Fibrillation PVI Apr 2011 (Cryo+RF-PVI + roof line + CTI line)     [place] ABLATE HEART DYSRHYTHM FOCUS  07/2011    Re-do PVI Jul 2011 (RFA-PVI + CFE + VIDYA + confirmation of CTI line)     TOTAL SHOULDER REPLACEMENT Right 03/03/2016    Dr. Abernathy of [facility]     WRIST SURGERY Left      ZZC MYERS W/O FACETEC FORAMOT/DSKC 1/2 VRT SEG, CERVICAL      Laminectomy Lumbar;  Recorded: 03/09/2012;       Social History  Tobacco:   History   Smoking Status     Former     Packs/day: 1.00     Years: 4.00     Types: Cigarettes     Quit date: 1/1/1968   Smokeless Tobacco     Never    Alcohol:   Social History    Substance and Sexual Activity      Alcohol use: Yes        Alcohol/week: 2.0 standard drinks of alcohol        Comment: Alcoholic Drinks/day: 1 beer per week   Illicit Drugs:   History   Drug Use No       Family History  Family History   Problem Relation Age of Onset     Cancer Mother         leukemia     Cancer Father         bladder     Cancer Sister         breast with lung met.     Aneurysm Sister      CABG Brother      CABG Brother      Valvular heart disease Brother         valve replacement          Deon Iglesias MD on 5/23/2023      cc: Vivek Guerrero

## 2023-05-23 NOTE — PLAN OF CARE
Heart Failure Care Map  GOALS TO BE MET BEFORE DISCHARGE:    1. Decrease congestion and/or edema with diuretic therapy to achieve near optimal volume status.     Dyspnea improved: No, further care required to meet this goal. Please explain Pt dyspneic upon exertion; saturations to the mid 70's. Pt recovers within 2  minutes with pursed lipped breathing.  LS are diminished in bilateral lobes; cough is congested with occasional sputum noted.    Edema improved: Yes, satisfactory for discharge.  Trace edema noted to the lower extremities.  Diuresing with PO lasix, good o/p per external catheter.    I/O this shift:  In: 622 [P.O.:622]  Out: 600 [Urine:600]            Net I/O and Weights since admission:   04/22 2300 - 05/22 2259  In: 6785 [P.O.:5982; I.V.:803]  Out: 62340 [Urine:56547]  Net: -8315     Vitals:    05/18/23 0839 05/18/23 1815 05/19/23 0412 05/20/23 0348   Weight: 130.6 kg (288 lb) 122 kg (268 lb 15.4 oz) 123.5 kg (272 lb 4.3 oz) 121.9 kg (268 lb 11.9 oz)    05/21/23 0335 05/22/23 0616   Weight: 121.8 kg (268 lb 8.3 oz) 120.6 kg (265 lb 14 oz)       2.  O2 sats > 90% on room air, or at prior home O2 therapy level.      Able to wean O2 this shift to keep sats above 90%?: No, further care required to meet this goal. Please explain Pt on oxygen at 10 lpm/ Oxy mask   Does patient use Home O2? Yes-  10 Lpm at home          Current oxygenation status:   SpO2: 96 %     O2 Device: Nasal cannula, 10 L/min    3.  Tolerates ambulation and mobility near baseline.     Ambulation: No, further care required to meet this goal. Please explain Pt dyspneic with any type of activity, desaturates even when talking.   Times patient ambulated with staff this shift: 0    Cardiac rhythm: A-fib w/BBB    Please review the Heart Failure Care Map for additional HF goal outcomes.    Francheska Vogel RN  5/22/2023

## 2023-05-23 NOTE — PROGRESS NOTES
"Palliative Care Initial Social Work Note  Location: [facility]    Patient Info:  Buck Sinclair is a 78 year old male with a PMHx significant for (per Dr. Garrett's note)  HFrEF/RV dysfunction due to nonischemic cardiomyopathy (out of proportion to CAD) with LVEF most recently noted to be 50%, persistent AF s/p multiple electrical cardioversions and extensive catheter ablations x2 in 2011, CAD s/p CANDELARIA x3 to the [id number] LAD, COPD on home O2, Medtronic dual-chamber permanent pacemaker placement in 1999 replaced with a Medtronic dual-chamber implantable cardiac defibrillator on 6/11/2014, PHTN, HTN, and HLD presenting in acute hypoxic respiratory failure in the setting of ADHF    Brief summary of visit: Joint visit with Palliative NP David Kulkarni. Met with Pt and wife Neela. Introduced selves and role of Palliative Care team. Visited with Pt about what he has been hearing from his doctors and how he has been doing at home. He lives with his wife Neela in their home and has been on home oxygen for 3 years. He is independent with all ADLs, and continues to like to bernard in the garage. His O2 needs have slowly increased over the past few months. They are hopeful that fluid will decrease and his O2 needs will decrease and he will be able to return home. He has considered TCU, but would rather return home with home care. Wife is agreeable as well.   Pt and wife state that time with friends and family, and their cat Wild Wicho are most important to him. [place] is also [place]. Pt and Neela have been  for 56 years. They have 3 children who are involved and supportive.   Discussed code status with Pt and spouse, but no changes made today. Pt is clear that he would not want to \"live in a nursing home\" for the rest of his life and has experience with codes as a former .   We also talked about referral to outpatient palliative clinic for " ongoing support.       Date of Admission: 5/18/2023    Reason for consult: Goals of care  Patient and family support    Sources of information: Patient  Family member wife Neela    Recommendations & Plan:  PCSW continues to follow.        Symptoms & Concerns Addressed Today:  Emotional coping    Strengths Identified:    Good support    Clinical Social Work Interventions:   Assessment of palliative specific issues    Introduction of Palliative clinical social work interventions  Goals of care discussion/facilitation  Life review facilitation      [facility]  [facility], Palliative Care  Securely message with the [employer] Web Console (learn more here) or  Text page via [facility] Paging/Directory

## 2023-05-23 NOTE — PROGRESS NOTES
Patient remains on 8L green HFNC. Xopenex/Atrovent nebs given as scheduled. BS diminished pre and post neb treatments.

## 2023-05-23 NOTE — CONSULTS
Palliative Care Consultation Note  [facility]      Patient: Buck Sinclair  Date of Admission:  5/18/2023    Requesting Clinician / Team: Hospital medicine  Reason for consult: Goals of care     Recommendations & Counseling     GOALS OF CARE:     Life-prolonging currently without limits     Hoping to return home with home services     Would not want to be in long-term care    Salvador and family to discuss further his wishes and code status     Recommend OP palliative clinic follow-up for continued support and exploration of cares.     ADVANCE CARE PLANNING:    No health care directive on file. Per  informed consent policy next of kin should be involved if patient becomes unable.    There is no POLST form on file, plan to complete prior to DC.    Code status: Full Code    MEDICAL MANAGEMENT:   #Dyspnea,Pulmonary Hypertension, HFrEF     Medical and diuretic management per primary team      Oxygen, baseline 6-10 lpm. Has been on 10L for over 2 weeks     Repositioning    Fan at bedside    #General Weakness/Debility     Physical Therapy    Occupational therapy    Therapy Recommendations:anticipate discharge to home with home care services vs TCU      PSYCHOSOCIAL/SPIRITUAL:    Family     Friends  Shanice community: [employer]     Palliative Care will continue to follow. Thank you for the consult and allowing us to aid in the care of Buck Sinclair.      David Kulkarni NP  Securely message with [employer] (more info)  Text page via [facility] Paging/Directory       Palliative Summary/HPI     Buck Sinclair is a 78 year old male who has a past medical history significant for HFrEF, status post dual-chamber ICD, CAD, hypertension, persistent atrial fibrillation, chronic hypoxic respiratory failure most recently 10 L PNC, severe COPD who presented to the emergency department due to acute on chronic shortness of breath.  Patient has had progressively worsening dyspnea on exertion along with this  paroxysmal nocturnal dyspnea and edema/fluid retention.  No chest pain.  No cough.  No abdominal pain.  No melena, hematochezia or hematemesis.  Patient was briefly seen in the pulmonology clinic by Dr. Payton this morning and upon patient's arrival he looked cyanotic, tachypneic and his SPO2 was in the 70s on 10 L/min.  Ambulance contacted right away and transported patient to the emergency department.  In the emergency department, patient was noted to be on respiratory distress and was therefore placed on BiPAP which has now been weaned down to 6 L nasal cannula.  Admitted for further evaluation and management    Palliative Care Summary:   Met with Salvador and Neela.   I introduced our role as an extra layer of support and how we help patients and families dealing with serious, potentially life-limiting illnesses. I explained the composition of the palliative care team.  Palliative care helps patients and families navigate their care while focusing on the whole person; providing emotional, social and spiritual support  Palliative care often assists with symptom management, information sharing about what to expect from the illness, available treatment options and what effect those options may have on the disease course, and provide effective communication and caring support.    Prognosis, Goals, & Planning:      Functional Status just prior to this current hospitalization:    There has been a decline in daily function including increasing weakness, fatigue, and edema .      ECOG2 (Ambulatory and capable of all selfcare but unable to carry out any work activities; may need help with IADLs up and about > 50% of waking hours)      Prognosis, Goals, and/or Advance Care Planning:    We discussed general treatment options (full/restorative, selective/conservatives, and comfort only/hospice). We then discussed how these specifically apply to Salvador. Based on this discussion, Salvador is continuing to hope for improvement and increase  strength. He feels close to his baseline with his oxygenation but feeling weak. He is not interested in going to a TCU but would be interested in home therapies. He reflected on his past as a  and has seen pts in a nursing home and would not want to live in a nursing home. He also has seen and done CPR many times and has how pt have done after.       Code Status was addressed today:     Yes, We discussed potential risks and rationale of attempting cardiac resuscitation, intubation, and mechanical ventilation.  We also discussed probability of survival as well as quality of life implications.  Based on this discussion, patient or surrogate response/decision: they are needing more time to reflect and to discuss as a family further.         Patient's decision making preferences: shared with support from loved ones          Patient has decision-making capacity today for complex decisions:Intact            Coping, Meaning, & Spirituality:     Mood, coping, and/or meaning in the context of serious illness were addressed today: Yes    Social:   Living situation:lives with significant other/spouse  Important relationships/caregivers:Neela, 3 children   Areas of fulfillment/bobby[place] the [place]    ROS:  Comprehensive ROS is reviewed and is negative except as here & per HPI:     Physical Exam   Vital Signs with Ranges  Temp:  [97.5  F (36.4  C)-97.9  F (36.6  C)] 97.5  F (36.4  C)  Pulse:  [64-85] 77  Resp:  [19-22] 20  BP: (111-125)/(72-91) 115/76  SpO2:  [90 %-96 %] 96 %  265 lbs 4.8 oz    Physical Exam  Vitals and nursing note reviewed.   Constitutional:       General: He is not in acute distress.  HENT:      Head: Normocephalic.      Mouth/Throat:      Mouth: Mucous membranes are moist.      Pharynx: Oropharynx is clear.   Eyes:      Extraocular Movements: Extraocular movements intact.      Conjunctiva/sclera: Conjunctivae normal.      Pupils: Pupils are equal, round, and  reactive to light.   Cardiovascular:      Rate and Rhythm: Normal rate and regular rhythm.      Pulses: Normal pulses.   Pulmonary:      Effort: Pulmonary effort is normal. No respiratory distress.      Breath sounds: No wheezing.   Abdominal:      General: Abdomen is flat. There is no distension.      Tenderness: There is no abdominal tenderness.   Musculoskeletal:         General: Normal range of motion.   Skin:     General: Skin is warm and dry.      Capillary Refill: Capillary refill takes less than 2 seconds.   Neurological:      General: No focal deficit present.      Mental Status: He is alert. Mental status is at baseline.   Psychiatric:         Mood and Affect: Mood normal.         Thought Content: Thought content normal.           Data reviewed:  Results for orders placed or performed during the hospital encounter of 05/18/23 (from the past 24 hour(s))   INR   Result Value Ref Range    INR 2.44 (H) 0.85 - 1.15   CBC with platelets   Result Value Ref Range    WBC Count 9.0 4.0 - 11.0 10e3/uL    RBC Count 3.76 (L) 4.40 - 5.90 10e6/uL    Hemoglobin 11.8 (L) 13.3 - 17.7 g/dL    Hematocrit 38.0 (L) 40.0 - 53.0 %     (H) 78 - 100 fL    MCH 31.4 26.5 - 33.0 pg    MCHC 31.1 (L) 31.5 - 36.5 g/dL    RDW 16.3 (H) 10.0 - 15.0 %    Platelet Count 139 (L) 150 - 450 10e3/uL   Basic metabolic panel   Result Value Ref Range    Sodium 143 136 - 145 mmol/L    Potassium 3.7 3.4 - 5.3 mmol/L    Chloride 102 98 - 107 mmol/L    Carbon Dioxide (CO2) 30 (H) 22 - 29 mmol/L    Anion Gap 11 7 - 15 mmol/L    Urea Nitrogen 53.9 (H) 8.0 - 23.0 mg/dL    Creatinine 1.88 (H) 0.67 - 1.17 mg/dL    Calcium 8.9 8.8 - 10.2 mg/dL    Glucose 147 (H) 70 - 99 mg/dL    GFR Estimate 36 (L) >60 mL/min/1.73m2   Magnesium   Result Value Ref Range    Magnesium 2.2 1.7 - 2.3 mg/dL   ECG 12-LEAD WITH MUSE (LHE)   Result Value Ref Range    Systolic Blood Pressure  mmHg    Diastolic Blood Pressure  mmHg    Ventricular Rate 73 BPM    Atrial Rate 72 BPM     OR Interval  ms    QRS Duration 114 ms     ms    QTc 524 ms    P Axis  degrees    R AXIS 129 degrees    T Axis 186 degrees    Interpretation ECG       Undetermined rhythm  Incomplete right bundle branch block  Possible Right ventricular hypertrophy  Inferior infarct , age undetermined  ST & T wave abnormality, consider anterolateral ischemia  Prolonged QT  Abnormal ECG  When compared with ECG of 22-MAY-2023 12:22,  Current undetermined rhythm precludes rhythm comparison, needs review       *Note: Due to a large number of results and/or encounters for the requested time period, some results have not been displayed. A complete set of results can be found in Results Review.          85 MINUTES SPENT BY ME on the date of service doing chart review, history, exam, documentation & further activities per the note.

## 2023-05-24 NOTE — PROGRESS NOTES
"RESPIRATORY CARE NOTE:    PT was on 12L high flow bubbler nasal cannula increased to 15L and titrated back down to 10L NC with an SpO2 of 94%.  PT states that before he was hospitalized, he was up to 10L at home.  Breathing pattern dyspnea on exertion Breath sounds diminished Cough type frequent, congested Xopenex/Atrovent nebulizer given x2. Flutter therapy done x1.  PT states he is doing incentive spirometry on his own regularly.  PT tolerated treatments well.  RT will continue to follow.      /78 (BP Location: Right arm)   Pulse 77   Temp 97.7  F (36.5  C) (Oral)   Resp 19   Ht 1.905 m (6' 3\")   Wt 117 kg (257 lb 15 oz)   SpO2 97%   BMI 32.24 kg/m      Luis Valladares, RT  5/24/2023      "

## 2023-05-24 NOTE — PROGRESS NOTES
PALLIATIVE CARE PROGRESS NOTE  [facility]     Patient Name: Buck Sinclair  Date of Admission: 5/18/2023   Today the patient was seen for: C follow-up      Recommendations & Counseling     Patient: Buck Sinclair  Date of Admission:  5/18/2023     Requesting Clinician / Team: Hospital medicine  Reason for consult: Goals of care      Recommendations & Counseling      GOALS OF CARE:     Life-prolonging currently without limits     Return home with home services     Would not want to be in long-term care    Salvador and family to discuss further his wishes and code status     Recommend OP palliative clinic follow-up for continued support and exploration of cares. (Referral placed today)     ADVANCE CARE PLANNING:    No health care directive on file. Per [facility] informed consent policy next of kin should be involved if patient becomes unable.    There is no POLST form on file, plan to complete prior to DC.    Code status: Full Code     MEDICAL MANAGEMENT:   #Dyspnea,Pulmonary Hypertension, HFrEF     Medical and diuretic management per primary team      Oxygen, baseline 6-10 lpm. Has been on 10L for over 2 weeks     Repositioning    Fan at bedside     #General Weakness/Debility     Physical Therapy    Occupational therapy  Therapy Recommendations:anticipate discharge to home with home care services     PSYCHOSOCIAL/SPIRITUAL:    Family     Friends    Shanice community: [employer]      Palliative Care will continue to follow. Thank you for the consult and allowing us to aid in the care of Buck Sinclair.         David Kulkarni NP  Securely message with [employer] (more info)  Text page via [facility] Paging/Directory           Assessments             Buck Sinclair is a 78 year old male who has a past medical history significant for HFrEF, status post dual-chamber ICD, CAD, hypertension, persistent atrial fibrillation, chronic hypoxic respiratory failure most recently 10 L PNC, severe COPD who  presented to the emergency department due to acute on chronic shortness of breath.  Patient has had progressively worsening dyspnea on exertion along with this paroxysmal nocturnal dyspnea and edema/fluid retention.  No chest pain.  No cough.  No abdominal pain.  No melena, hematochezia or hematemesis.  Patient was briefly seen in the pulmonology clinic by Dr. Payton this morning and upon patient's arrival he looked cyanotic, tachypneic and his SPO2 was in the 70s on 10 L/min.  Ambulance contacted right away and transported patient to the emergency department.  In the emergency department, patient was noted to be on respiratory distress and was therefore placed on BiPAP which has now been weaned down to 6 L nasal cannula.  Admitted for further evaluation and management  Prognosis, Goals, or Advance Care Planning was addressed today with: Yes.  Mood, coping, and/or meaning in the context of serious illness were addressed today: Yes.              Interval History:     Chart review/discussion with unit or clinical team members:   Course reviewed. No significant events overnight, up to 12 LPM but now back down to 10 L.      Per patient or family/caregivers today:  No acute changes. Dyspnea stable. Feels close to baseline. Feeling comfortable with discharging home soon. Inquired about home PT and confirmation of services. Neela is also requesting adequate notification on when discharge will be in order to prepare for him to return home.             Review of Systems:     Besides above, an additional  system ROS was reviewed and is unremarkable               Physical Exam:   Temp: 97.8  F (36.6  C) Temp src: Oral Temp  Min: 97.5  F (36.4  C)  Max: 98.2  F (36.8  C) BP: 122/84 Pulse: 71   Resp: 19 SpO2: 94 % O2 Device: High Flow Nasal Cannula (HFNC) Oxygen Delivery: 12 LPM  Vital Signs with Ranges  Temp:  [97.5  F (36.4  C)-98.2  F (36.8  C)] 97.8  F (36.6  C)  Pulse:  [69-85] 71  Resp:  [19-20] 19  BP: (106-125)/(68-90)  122/84  SpO2:  [91 %-98 %] 94 %  257 lbs 15.01 oz    Physical Exam  Vitals and nursing note reviewed.   Constitutional:       General: He is not in acute distress.  HENT:      Head: Normocephalic.      Mouth/Throat:      Mouth: Mucous membranes are moist.      Pharynx: Oropharynx is clear.   Eyes:      Extraocular Movements: Extraocular movements intact.      Conjunctiva/sclera: Conjunctivae normal.      Pupils: Pupils are equal, round, and reactive to light.   Cardiovascular:      Rate and Rhythm: Normal rate. Rhythm irregular.      Pulses: Normal pulses.   Pulmonary:      Effort: Pulmonary effort is normal. No respiratory distress.      Breath sounds: No wheezing.   Abdominal:      General: Abdomen is flat. There is no distension.      Tenderness: There is no abdominal tenderness.   Musculoskeletal:         General: Normal range of motion.   Skin:     General: Skin is warm and dry.      Capillary Refill: Capillary refill takes less than 2 seconds.   Neurological:      General: No focal deficit present.      Mental Status: He is alert. Mental status is at baseline.   Psychiatric:         Mood and Affect: Mood normal.         Thought Content: Thought content normal.                  Current Problem List:   Principal Problem:    Acute on chronic congestive heart failure, unspecified heart failure type (H)      Allergies   Allergen Reactions     Adhesive Tape Other (See Comments)     ADHESIVE TAPE; SKIN IRRITATION; Skin pulled off with foam tape       Amiodarone      ADVERSE REACTION.  Sunlight sensitivity.     Lisinopril             Data Reviewed:       Data   Results for orders placed or performed during the hospital encounter of 05/18/23 (from the past 24 hour(s))   ECG 12-LEAD WITH MUSE (LHE)   Result Value Ref Range    Systolic Blood Pressure  mmHg    Diastolic Blood Pressure  mmHg    Ventricular Rate 73 BPM    Atrial Rate 72 BPM    MI Interval  ms    QRS Duration 114 ms     ms    QTc 524 ms    P Axis   degrees    R AXIS 129 degrees    T Axis 186 degrees    Interpretation ECG       Undetermined rhythm  Incomplete right bundle branch block  Possible Right ventricular hypertrophy  Inferior infarct , age undetermined  ST & T wave abnormality, consider anterolateral ischemia  Prolonged QT  Abnormal ECG  When compared with ECG of 22-MAY-2023 12:22,  Current undetermined rhythm precludes rhythm comparison, needs review     INR   Result Value Ref Range    INR 2.30 (H) 0.85 - 1.15   Potassium   Result Value Ref Range    Potassium 3.5 3.4 - 5.3 mmol/L   Creatinine   Result Value Ref Range    Creatinine 1.64 (H) 0.67 - 1.17 mg/dL    GFR Estimate 43 (L) >60 mL/min/1.73m2     *Note: Due to a large number of results and/or encounters for the requested time period, some results have not been displayed. A complete set of results can be found in Results Review.   *      -----------------------------------------------------------------------------------------------------------------          ====================================================  TT: I have personally spent a total of 45 minutes on the date of the encounter,  on the unit in review of medical record, consultation with the medical providers and assessment of Buck GOLDMAN Yahir  today, with more than 50% of this time spent in counseling, coordination of care, and discussion re: diagnostic results, prognosis, symptom management, risks and benefits of management options, and development of plan of care as noted above.      ====================================================

## 2023-05-24 NOTE — PROGRESS NOTES
"CLINICAL NUTRITION SERVICES - REASSESSMENT NOTE     Nutrition Prescription    RECOMMENDATIONS FOR MDs/PROVIDERS TO ORDER:    Malnutrition Status:    Moderate on acute    Recommendations already ordered by Registered Dietitian (RD):  None at this time    Future/Additional Recommendations:  Monitor intake, weight, labs, understanding of diet/fluid restriction     EVALUATION OF THE PROGRESS TOWARD GOALS   Diet: 2 g Sodium and 1500 mL Fluid Restriction  Nutrition Support: none  Intake: 100% of meals per flowsheets, ordering 2 meals per day       NEW FINDINGS   Met with pt and pt's wife. Good appetite now and PTA. Noted only ordering 2 meals per day, states typically only eats 2 meals per day. Questions about fluid restriction/what's considered a liquid.     No longer wanted to receive Ensure d/t taking up too much of his fluid allowance    Noted pt with thrush    ANTHROPOMETRICS  Height: 190.5 cm (6' 3\")  Most Recent Weight: 117 kg (257 lb 15 oz)    Weight History:   Date/Time Weight   05/24/23 0341 117 kg (257 lb 15 oz)   05/23/23 0653 120.3 kg (265 lb 4.8 oz)   05/22/23 0616 120.6 kg (265 lb 14 oz)   05/21/23 0335 121.8 kg (268 lb 8.3 oz)   05/20/23 0348 121.9 kg (268 lb 11.9 oz)   05/19/23 0412 123.5 kg (272 lb 4.3 oz)   05/18/23 1815 122 kg (268 lb 15.4 oz)   05/18/23 0839 130.6 kg (288 lb)     GI CONCERNS  BM 5/22    LABS  Reviewed    MEDICATIONS  Reviewed    Malnutrition Diagnosis: Moderate malnutrition  In Context of:  Acute illness or injury    PREVIOUS/CURRENT NUTRITION DIAGNOSIS  Inadequate oral intake related to acute illness as evidenced by pt wife report of pt having limited energy and low po intakes and pt not meeting nutritional needs. On-going  Food- and nutrition-related knowledge deficit R/t 2 gm Na diet as evidence by need for nutrition education. On-going    INTERVENTIONS  Implementation  None at this time    Goals  Patient to consume % of nutritionally adequate meals three times per day, or " the equivalent with supplements/snacks.-progressing  Patient will verbalize understanding of diet. Met during the education session    Monitoring/Evaluation  Progress toward goals will be monitored and evaluated per protocol.

## 2023-05-24 NOTE — PROGRESS NOTES
PULMONARY / CRITICAL CARE PROGRESS NOTE    Date / Time of Admission:  5/18/2023  8:33 AM    Assessment:       Buck Sinclair is a 78 year old male with history of chronic hypoxic respiratory failure on 10 L/min NC since December 2022, COPD, history of environmental exposures (Agent Orange, dust/fire inhalation), remote tobacco use (quit 1968), HTN, coronary artery disease s/p PCI/stents, atrial fibrillation s/p PVL with ICD/PPM on chronic anticoagulation with warfarin, RV dysfunction.   Presented to [facility] ED on 5/18/2023 from Pulmonary Clinic with hypoxia/cyanosis (O2 sat 70's), admitted for acute on chronic hypoxic respiratory failure due to acute on chronic heart failure.    1. Acute on chronic respiratory failure   Due to RV dysfunction and mod -severe pulmonary hypertension.   Currently on treatment for COPD exacerbation acute bronchitis and decompensated cardiomyopathy.   Echocardiogram showed mod to severe pulm HTN, preserved EF 55-60%, no intracardiac shunt.   Patient is on chronic anticoagulation for atrial fibrillation. Had a normal lung perfusion scan on 2/2022.   Continue titrate FiO2. Taper systemic steroids. Continue IV diuresis . Follow up BNP.   Repeat lung V/Q scan.   2. RV dysfunction with moderate - severe pulmonary hypertension   Due to COPD, HFpEF.   Echocardiogram showed right ventricle is mildly dilated. Moderately decreased right ventricular systolic function. The left atrium is moderate to severely dilated. The right atrium is severely dilated.  Negative Bubble Study for atrial shunt.   Had a negative sleep study on 2015.   PFTs showed a moderately severe obstructive lung disease   Chronic anticoagulation for atrial fibrillation. Had a normal lung perfusion scan on 2/2022  Recommend to repeat a lung V/Q scan to rule chronic thromboembolic disease.   Continue IV diuresis   3. HTN, HFpEF  4. Atrial fibrillation   5. Severe COPD with acute exacerbation   6. Acute bronchitis    7. CKD stage 3  8. Obesity Body mass index is 32.24 kg/m .    Advance Directives:     Plan:   1. Titrate FiO2  2. BiPAP at night and as needed  3. Bronchodilators as needed  4. Taper down systemic steroids  5. Resume ICS/LABA as outpatient    6. Narrow Abx to doxycycline for 5 days   7. Titrate BP meds  8. Continue diuresis   9. Follow up lung V/Q scan  10. PPI for GI prophylaxis   11. DVT prophylaxis , patient is anticoagulated.     Please contact me if you have any questions.    Jose Forman  Pulmonary / Critical Care  05/24/23 11:30 AM      Subjective:   HPI:  Buck Sinclair is a 78 year old male with history of chronic hypoxic respiratory failure on 10 L/min NC since December 2022, COPD, history of environmental exposures (Agent Orange, dust/fire inhalation), remote tobacco use (quit 1968), coronary artery disease s/p PCI/stents, atrial fibrillation s/p PVL with ICD/PPM on chronic anticoagulation with warfarin, RV dysfunction.   Presented to [facility] ED on 5/18/2023 from Pulmonary Clinic with hypoxia/cyanosis (O2 sat 70's), admitted for acute on chronic hypoxic respiratory failure due to acute on chronic heart failure.    Events overnight   - High O2 requirements  - generalized weakness  - Reports feeling better    Allergies: Adhesive tape, Amiodarone, and Lisinopril     MEDS:  Current Facility-Administered Medications   Medication     acetaminophen (TYLENOL) tablet 650 mg     albuterol (PROVENTIL HFA/VENTOLIN HFA) inhaler     amoxicillin-clavulanate (AUGMENTIN) 875-125 MG per tablet 1 tablet     atorvastatin (LIPITOR) tablet 40 mg     bisacodyl (DULCOLAX) suppository 10 mg     Continuing ACE inhibitor/ARB/ARNI from home medication list OR ACE inhibitor/ARB/ARNI order already placed during this visit     Continuing beta blocker from home medication list OR beta blocker order already placed during this visit     doxycycline monohydrate (MONODOX) capsule 100 mg     FLUoxetine  "(PROzac) capsule 20 mg     [Held by provider] fluticasone-vilanterol (BREO ELLIPTA) 200-25 MCG/ACT inhaler 1 puff     furosemide (LASIX) tablet 80 mg     ipratropium (ATROVENT) 0.02 % neb solution 0.5 mg     levalbuterol (XOPENEX) neb solution 1.25 mg     lidocaine (LMX4) cream     lidocaine 1 % 0.1-1 mL     [Held by provider] losartan (COZAAR) tablet 25 mg     melatonin tablet 1 mg     nystatin (MYCOSTATIN) suspension 1,000,000 Units     ondansetron (ZOFRAN ODT) ODT tab 4 mg    Or     ondansetron (ZOFRAN) injection 4 mg     oxymetazoline (AFRIN) 0.05 % spray 2 spray     pantoprazole (PROTONIX) EC tablet 40 mg     polyethylene glycol (MIRALAX) powder 17 g     [START ON 5/29/2023] predniSONE (DELTASONE) tablet 10 mg     [START ON 5/26/2023] predniSONE (DELTASONE) tablet 20 mg     predniSONE (DELTASONE) tablet 30 mg     senna-docusate (SENOKOT-S/PERICOLACE) 8.6-50 MG per tablet 2 tablet     sodium chloride (OCEAN) 0.65 % nasal spray 1 spray     sodium chloride (PF) 0.9% PF flush 3 mL     sodium chloride (PF) 0.9% PF flush 3 mL     sotalol (BETAPACE) tablet 120 mg     [Held by provider] umeclidinium (INCRUSE ELLIPTA) 62.5 MCG/ACT inhaler 1 puff     warfarin ANTICOAGULANT (COUMADIN) tablet 2.5 mg     Warfarin Dose Required Daily - Pharmacist Managed     Objective:   VITALS:  /76 (BP Location: Right arm)   Pulse 80   Temp 97.5  F (36.4  C) (Oral)   Resp 20   Ht 1.905 m (6' 3\")   Wt 120.3 kg (265 lb 4.8 oz)   SpO2 94%   BMI 33.16 kg/m    VENT:  FiO2 (%): 50 %  Resp: 20    EXAM:   Gen: awake, alert, no distress  HEENT: pink conjunctiva, moist mucosa, Mallampati II/IV  Neck: no thyromegaly, masses or JVD  Lungs: clear  CV: regular, no murmurs or gallops appreciated  Abdomen: soft, NT, BS wnl  Ext: no edema  Neuro: CN II-XII intact, non focal       Data Review:  Recent Labs   Lab 05/23/23  0438 05/22/23  0456 05/21/23  0431 05/20/23  0427 05/19/23  0446 05/18/23  0855   * 144* 151* 152* 164* 122*      " 05/24/23 05:13   Potassium 3.5   Creatinine 1.64 (H)   GFR Estimate 43 (L)      05/23/23 04:38   WBC 9.0   Hemoglobin 11.8 (L)   Hematocrit 38.0 (L)   Platelet Count 139 (L)   RBC Count 3.76 (L)    (H)   MCH 31.4   MCHC 31.1 (L)   RDW 16.3 (H)       Coronary angiogram and RHC 1/24/22  Right sided filling pressures are moderately elevated. Left sided filling pressures are normal. Severely elevated pulmonary artery hypertension. Left ventricular filling pressures are normal. Normal cardiac output level.    Echocardiogram 5/18/2023  Left ventricular size, wall motion and function are normal. The ejection fraction is 55-60%.  The right ventricle is moderately dilated.  Moderately decreased right ventricular systolic function  The left atrium is moderately dilated.  The right atrium is severely dilated.  There is moderate to mod-severe (2-3+) tricuspid regurgitation.  Severe (>55mmHg) pulmonary hypertension is present.  IVC diameter >2.1 cm collapsing <50% with sniff suggests a high RA pressure estimated at 15 mmHg or greater.    Echocardiogram 5/21/2023  1. The left ventricle is normal in size. Left ventricular function is normal.The ejection fraction is 55-60%.  2. The right ventricle is mildly dilated.Moderately decreased right ventricular systolic function  3. The left atrium is moderate to severely dilated. The right atrium is severely dilated.  4. A contrast injection (Bubble Study) was performed that was negative for flow across the interatrial septum. There is no color Doppler evidence of an atrial shunt.   Limited views were obtained.    CT CHEST W/O CONTRAST  LOCATION: [facility]  DATE/TIME: 5/19/2023 10:38 AM CDT     INDICATION: dyspnea, hypoxia, CHF, possible pneumonia  COMPARISON: CT of the chest 08/17/2022  FINDINGS:   LUNGS AND PLEURA: Unchanged parenchymal heterogeneity relatively lucent apices and hazy groundglass attenuation within the bases. There are areas of subpleural  architectural distortion and opacity in both bases consistent with cicatrization atelectasis. Such opacity in the medial right lower lobe is associated with crowding of bronchovascular structures and a few internal air bronchograms. These findings are unchanged. No new airspace opacities or interlobular septal thickening. Trachea and central airways are patent and normal caliber. No endoluminal airway material.  MEDIASTINUM: Right subclavian approach dual chamber pacemaker has right atrial appendage and right ventricular leads. Severe enlargement of the right atrium. Mild enlargement of the left atrium. There are left atrial wall calcifications, likely sequela of prior ablation procedure. Dilated main and central pulmonary arteries. Mild calcification of aortic and mitral annuli. No pericardial effusion or thickening. Hilar and mediastinal lymph nodes are normal by size criteria. Esophagus is decompressed.  CORONARY ARTERY CALCIFICATION: Severe.  UPPER ABDOMEN: Symmetric renal atrophy.  MUSCULOSKELETAL: Unchanged thoracic spine degenerative osteophytes from T3 through the thoracolumbar junction. No vertebral compression deformity. There is a right shoulder joint replacement.                                         IMPRESSION:    1.  Unchanged bibasilar peripheral parenchymal scarring. No acute lung, airway, or pleural space abnormality.  2.  Biatrial and right ventricular heart enlargement. Dilated central pulmonary arteries. Findings are consistent with acute pulmonary hypertension. Calcification in the walls of the left atrium likely from prior ablation procedure.        By:  Jose Forman MD, 05/24/23    Primary Care Physician:  Vivek Guerrero

## 2023-05-24 NOTE — PROGRESS NOTES
HEART CARE NOTE          Assessment/Recommendations     1. RV dysfunction/HFimpEF  Assessment / Plan  Tolerating oral diuretic regimen - no changes at this time     2. CAD  Assessment / Plan  S/p CANDELARIA x3; denies chest pain or anginal equivalent   Continue ASA unless otherwise contraindicated     3. Atrial fibrillation  Assessment / Plan  S/p ablation x2; s/p ICD  Currently on sotalol - reduced 2/2 prolonged QTc; Coumadin management per pharmacy     4. CHRISSY on CKD   Assessment / Plan  Continue to monitor closely with diuresis     5. Moderate-severe Pulmonary HTN  Assessment / Plan  Primarily WHO group 3 - follows with Dr. Payton in pulmonary clinic and Dr. Morales; inpatient pulmonary team following along during this admission    Cardiology team will sign-off for now. Please do not hesitate to consult us again if new questions or concerns arise. Follow-up appointment will be arranged by CORE/HF clinic.       History of Present Illness/Subjective      Mr. Buck Sinclair is a 78 year old male with a PMHx significant for (per Dr. Garrett's note)  HFrEF/RV dysfunction due to nonischemic cardiomyopathy (out of proportion to CAD) with LVEF most recently noted to be 50%, persistent AF s/p multiple electrical cardioversions and extensive catheter ablations x2 in 2011, CAD s/p CANDELARIA x3 to the [id number]-go-lkoovn LAD, COPD on home O2, Medtronic dual-chamber permanent pacemaker placement in 1999 replaced with a Medtronic dual-chamber implantable cardiac defibrillator on 6/11/2014, PHTN, HTN, and HLD presenting in acute hypoxic respiratory failure in the setting of ADHF     Today, Mr. Bone denies any acute cardiac events or complaints; Management plan as detailed above     ECG: Personally reviewed. Paced rhythm     ECHO (personnaly Reviewed): repeat study pending   Left ventricular size, wall motion and function are normal. The ejection  fraction is 55-60%.  Global right ventricular function is mildly to moderately  "reduced.  Moderate tricuspid insufficiency is present.  RVSP is estimated 73mmHg.Moderate pulmonary hypertension is present.  IVC diameter >2.1 cm collapsing <50% with sniff suggests a high RA pressure  estimated at 15 mmHg or greater.     When compared with prior study of 12/08/2021, filling pressures/PASP are  higher now.          Physical Examination Review of Systems   /78 (BP Location: Right arm)   Pulse 76   Temp 97.8  F (36.6  C) (Oral)   Resp 20   Ht 1.905 m (6' 3\")   Wt 117 kg (257 lb 15 oz)   SpO2 96%   BMI 32.24 kg/m    Body mass index is 32.24 kg/m .  Wt Readings from Last 3 Encounters:   05/24/23 117 kg (257 lb 15 oz)   11/16/22 123.3 kg (271 lb 14.4 oz)   11/08/22 118.8 kg (262 lb)     General Appearance:   no distress, normal body habitus   ENT/Mouth: membranes moist, no oral lesions or bleeding gums.      EYES:  no scleral icterus, normal conjunctivae   Neck: no carotid bruits or thyromegaly   Chest/Lungs:   lungs are clear to auscultation, no rales or wheezing, equal chest wall expansion    Cardiovascular:   Regular. Normal first and second heart sounds with +KANG; no rubs, or gallops; the carotid, radial and posterior tibial pulses are intact, no JVD and trace LE edema bilaterally    Abdomen:  no organomegaly, masses, bruits, or tenderness; bowel sounds are present   Extremities: no cyanosis or clubbing   Skin: no xanthelasma, warm.    Neurologic: NAD     Psychiatric: NAD     A complete 10 systems ROS was reviewed  And is negative except what is listed in the HPI.          Medical History  Surgical History Family History Social History   Past Medical History:   Diagnosis Date     Anemia      Asthma without status asthmaticus 5/5/2021     BPH (benign prostatic hyperplasia)      Cardiomyopathy (H)      COPD, group B, by GOLD 2017 classification (H)      Coronary artery disease due to calcified coronary lesion      Dyslipidemia, goal LDL below 70      Essential hypertension      History of " transfusion      Persistent atrial fibrillation (H)      Pneumonia of left lower lobe due to infectious organism 10/4/2017     Skin cancer of trunk      Status post catheter ablation of atrial fibrillation 6/7/2017    PVI 4-2011 (Cryo/PVI + roof line + CTI line) Re-do PVI 7-2011 (RFA/PVI + CFE + VIDYA + confirmed CTI line)     Ventricular tachycardia (H)     Past Surgical History:   Procedure Laterality Date     CARDIAC DEFIBRILLATOR PLACEMENT       CARDIOVERSION  07/11/2018    x20, last 2/12/15, 10/2015, 11/18/16, 6/16/17 by Lauren Foster CNP     CARDIOVERSION  07/11/2018     CARDIOVERSION  11/19/2021     COLONOSCOPY N/A 4/28/2017    Procedure: COLONOSCOPY with 2 ascending polyps and 1 transverse polyp;  Surgeon: Jose Whittington MD;  Location: [facility];  Service:      CV CORONARY ANGIOGRAM N/A 9/20/2017    Procedure: Coronary Angiogram;  Surgeon: Sergio Cervantes MD;  Location: [facility] Cath Lab;  Service:      CV CORONARY ANGIOGRAM N/A 1/24/2022    Procedure: Coronary Angiogram;  Surgeon: Christi Saunders MD;  Location: [facility] LAB CV     CV LEFT HEART CATH N/A 1/24/2022    Procedure: Left Heart Cath;  Surgeon: Christi Saunders MD;  Location: [facility] LAB CV     CV RIGHT AND LEFT HEART CATH N/A 1/24/2022    Procedure: Right and Left Heart Catherization;  Surgeon: Christi Saunders MD;  Location: [facility] CV     EP ICD GENERATOR REPLACEMENT DUAL N/A 10/28/2022    Procedure: Implantable Cardioverter Defibrillator Generator Replacement Dual;  Surgeon: Elo Collins MD;  Location: [facility] CATH LAB CV     EP ICD INSERT       FRACTURE SURGERY Left     wrist     INGUINAL HERNIA REPAIR Left 1967    while in the [employer] in [place] after 13 month in [place]     INSERT / REPLACE / REMOVE PACEMAKER       IR MISCELLANEOUS PROCEDURE  4/30/2014     OTHER SURGICAL HISTORY      left hand surgery---tendon repair     CT ABLATE HEART DYSRHYTHM FOCUS  04/2011    Catheter Ablation Atrial Fibrillation PVI  2011 (Cryo+RF-PVI + roof line + CTI line)     [place] ABLATE HEART DYSRHYTHM FOCUS  2011    Re-do PVI 2011 (RFA-PVI + CFE + VIDYA + confirmation of CTI line)     TOTAL SHOULDER REPLACEMENT Right 2016    Dr. Abernathy of [facility]     WRIST SURGERY Left      ZZC MYERS W/O FACETEC FORAMOT/DSKC  VRT SEG, CERVICAL      Laminectomy Lumbar;  Recorded: 2012;    no family history of premature coronary artery disease Social History     Socioeconomic History     Marital status:      Spouse name: Not on file     Number of children: Not on file     Years of education: Not on file     Highest education level: Not on file   Occupational History     Not on file   Tobacco Use     Smoking status: Former     Packs/day: 1.00     Years: 4.00     Pack years: 4.00     Types: Cigarettes     Quit date: 1968     Years since quittin.4     Smokeless tobacco: Never   Vaping Use     Vaping status: Never Used   Substance and Sexual Activity     Alcohol use: Yes     Alcohol/week: 2.0 standard drinks of alcohol     Comment: Alcoholic Drinks/day: 1 beer per week     Drug use: No     Sexual activity: Yes     Partners: Female     Birth control/protection: Post-menopausal   Other Topics Concern     Parent/sibling w/ CABG, MI or angioplasty before 65F 55M? Not Asked   Social History Narrative    Preloaded 2013     Social Determinants of Health     Financial Resource Strain: Not on file   Food Insecurity: Not on file   Transportation Needs: Not on file   Physical Activity: Not on file   Stress: Not on file   Social Connections: Not on file   Intimate Partner Violence: Not on file   Housing Stability: Not on file           Lab Results    Chemistry/lipid CBC Cardiac Enzymes/BNP/TSH/INR   Lab Results   Component Value Date    CHOL 109 10/06/2022    HDL 58 10/06/2022    TRIG 39 10/06/2022    BUN 53.9 (H) 2023     2023    CO2 30 (H) 2023    Lab Results   Component Value Date    WBC  9.0 05/23/2023    HGB 11.8 (L) 05/23/2023    HCT 38.0 (L) 05/23/2023     (H) 05/23/2023     (L) 05/23/2023    Lab Results   Component Value Date     (H) 12/01/2020    TSH 2.38 05/18/2023    INR 2.44 (H) 05/23/2023     No results found for: CKTOTAL, CKMB, TROPONINI       Weight:    Wt Readings from Last 3 Encounters:   05/24/23 117 kg (257 lb 15 oz)   11/16/22 123.3 kg (271 lb 14.4 oz)   11/08/22 118.8 kg (262 lb)       Allergies  Allergies   Allergen Reactions     Adhesive Tape Other (See Comments)     ADHESIVE TAPE; SKIN IRRITATION; Skin pulled off with foam tape       Amiodarone      ADVERSE REACTION.  Sunlight sensitivity.     Lisinopril          Surgical History  Past Surgical History:   Procedure Laterality Date     CARDIAC DEFIBRILLATOR PLACEMENT       CARDIOVERSION  07/11/2018    x20, last 2/12/15, 10/2015, 11/18/16, 6/16/17 by Lauren Foster CNP     CARDIOVERSION  07/11/2018     CARDIOVERSION  11/19/2021     COLONOSCOPY N/A 4/28/2017    Procedure: COLONOSCOPY with 2 ascending polyps and 1 transverse polyp;  Surgeon: Jose Whittington MD;  Location: [facility];  Service:      CV CORONARY ANGIOGRAM N/A 9/20/2017    Procedure: Coronary Angiogram;  Surgeon: Sergio Cervantes MD;  Location: [facility] Cath Lab;  Service:      CV CORONARY ANGIOGRAM N/A 1/24/2022    Procedure: Coronary Angiogram;  Surgeon: Christi Saunders MD;  Location: [facility] LAB CV     CV LEFT HEART CATH N/A 1/24/2022    Procedure: Left Heart Cath;  Surgeon: Christi Saunders MD;  Location: [facility] LAB CV     CV RIGHT AND LEFT HEART CATH N/A 1/24/2022    Procedure: Right and Left Heart Catherization;  Surgeon: Christi Saunders MD;  Location: [facility] CV     EP ICD GENERATOR REPLACEMENT DUAL N/A 10/28/2022    Procedure: Implantable Cardioverter Defibrillator Generator Replacement Dual;  Surgeon: Elo Collins MD;  Location: [facility] CV     EP ICD INSERT       FRACTURE SURGERY Left      wrist     INGUINAL HERNIA REPAIR Left 1967    while in the [employer] in [place] after 13 month in [place]     INSERT / REPLACE / REMOVE PACEMAKER       IR MISCELLANEOUS PROCEDURE  4/30/2014     OTHER SURGICAL HISTORY      left hand surgery---tendon repair     AL ABLATE HEART DYSRHYTHM FOCUS  04/2011    Catheter Ablation Atrial Fibrillation PVI Apr 2011 (Cryo+RF-PVI + roof line + CTI line)     AL ABLATE HEART DYSRHYTHM FOCUS  07/2011    Re-do PVI Jul 2011 (RFA-PVI + CFE + VIDYA + confirmation of CTI line)     TOTAL SHOULDER REPLACEMENT Right 03/03/2016    Dr. Abernathy of [facility]     WRIST SURGERY Left      ZZC MYERS W/O FACETEC FORAMOT/DSKC 1/2 VRT SEG, CERVICAL      Laminectomy Lumbar;  Recorded: 03/09/2012;       Social History  Tobacco:   History   Smoking Status     Former     Packs/day: 1.00     Years: 4.00     Types: Cigarettes     Quit date: 1/1/1968   Smokeless Tobacco     Never    Alcohol:   Social History    Substance and Sexual Activity      Alcohol use: Yes        Alcohol/week: 2.0 standard drinks of alcohol        Comment: Alcoholic Drinks/day: 1 beer per week   Illicit Drugs:   History   Drug Use No       Family History  Family History   Problem Relation Age of Onset     Cancer Mother         leukemia     Cancer Father         bladder     Cancer Sister         breast with lung met.     Aneurysm Sister      CABG Brother      CABG Brother      Valvular heart disease Brother         valve replacement          Deon Iglesias MD on 5/24/2023      cc: Vivek Guerrero

## 2023-05-24 NOTE — PROGRESS NOTES
[facility]    PROGRESS NOTE - Hospitalist Service    Assessment and Plan    Principal Problem:    Acute on chronic congestive heart failure, unspecified heart failure type (H)  Active Problems:    Cardiomyopathy (H)    Essential hypertension    Persistent Atrial Fibrillation    Chronic heart failure with preserved ejection fraction (H)    CKD (chronic kidney disease) stage 3, GFR 30-59 ml/min (H)    Buck Sinclair is a 78 year old male with h/o hypertension, persistent atrial fibrillation, CAD, COPD, and CHF who presents to this ED by ambulance for evaluation of shortness of breath.         HFrEF with ADHF: deemed nonischemic dilated cardiomyopathy.    -TTE (9/13/2022) LVEF 55 to 60%, moderate TI, global RV function mild-moderately reduced, RSVP approximately 73 mmHg indicative of moderate pulmonary hypertension, estimated RA pressure 15 mmHg or greater.  -TTE (5/18/23) showed LVEF 55-60%, RV mod dilated, mod decreased RVS function, mod LAD, severe RAD, 2-3+ TR, severe PHTN, ~RAP >15mmHg  -Net negative 11liters  -cards seen and placed on Bumex today and signed off  - discussed with pulmonology who recommends continuing to push diuresis and continue Bumex IV 2mg Q8hrs and recheck BNP in and and renal function   -strict I/O, daily weights  - holding ARB for now     Acute on chronic hypoxic respiratory failure  Severe COPD   Moderate-to-severe pulmonary hypertension  -has been on oxygen chronically and for past several months was using 10L PNC?  -Acute decline due to volume overload in the setting of poor cardiopulmonary status chronically.  -required initially BiPAP on admission followed by HFNC and weaned to 8L on 5/22.  Increased overnight by nursing to 10L. Stable.   -Chest x-ray in emergency department showed new patchy airspace opacities in right lower lobe suggestive of area of pneumonitis/early proximal pneumonia.  Emphysematous changes. Cardiomegaly and mild pulmonary vascular  congestion.    -CT Chest w/o contrast due to CHRISSY (5/19/23) showed Unchanged bibasilar peripheral parenchymal scarring. No acute lung, airway, or pleural space abnormality. Biatrial and right ventricular heart enlargement. Dilated central pulmonary arteries.   -Repeat limited TTE with bubble study performed on 5/21/23 which was negative for interatrial shunt.  - hold Breo-Ellipta and Incruse Ellipta while on scheduled xopenex/atrovent nebs TID  -prednisone taper as ordered  -discussed with Pulm and will try to diurese more today  - bronchial hygiene ordered   - BQ scan pending      Possible CAP:   -Chest x-ray in emergency department showed new patchy airspace opacities in right lower lobe suggestive of area of pneumonitis/early proximal pneumonia.  Emphysematous changes. Cardiomegaly and mild pulmonary vascular congestion.    -CT Chest w/o contrast due to CHRISSY (5/19/23) showed Unchanged bibasilar peripheral parenchymal scarring. No acute lung, airway, or pleural space abnormality. Biatrial and right ventricular heart enlargement. Dilated central pulmonary arteries.   -Blood culture x2 ngtd, procalcitonin 0.01, respiratory panel PCR negative, MRSA nares negative.   -Empiric IV zosyn/Doxy stopped 5/23  -Augmentin/Doxy x 5 days from 5/23     Coronary artery disease: S/P CANDELARIA x3.  Coronary angiogram 1/24/2022 showed mild CAD with patent LAD stents in the proximal and mid LAD, left circumflex third obtuse marginal branch 30% stenosis, mid RCA lesion 20% stenosis.  LVEDP and PCWP normal.  PAP 66/24 mmHg with a mean pressure of 37 mmHg.  Shikha and TD cardiac outputs both 5.7 L/min.  -On warfarin, sotalol, statin  -start ASA 81mg qd  - No anginal complaints  - Troponin T-HS 31 ->30     Medtronic dual-chamber implantable cardiac defibrillator: -Indication:  primary prevention of sudden cardiac death.  -placed on 6/11/2014 with generator change on 10/28/2022  -device interrogation on 5/16/23 reviewed.      Persistent atrial  "fibrillation;  Supratherapeutic anticoagulation- resolved.  -per Dr. Garrett's last note, status post multiple electrical cardioversions, most recently on 11/19/2021. He underwent complex catheter ablation x2 in 2011. Overall fairly low burden of atrial arrhythmia based on his device interrogations. [id number] score is at least 5 (congestive heart failure, hypertension, age 75 or greater, coronary artery disease).  -warfarin, pharmacy to dose  - INR 2.44 today  --Sotalol dose reduced to 120mg q2hrs due to CrCl.     CHRISSY on CKD stage III: CHRISSY due to IV diuresis with net negative fluid loss of .  Admission Cr 1.64 carlo to peak of 1.96 (5/22) -> 1.88 today after IV diuresis was stopped on 5/22.  Baseline creatinine 1.5-1.6.   -hold Losartan until renal recovery near baseline then resume  - IV bumex restarted  - check renal function      HTN:  -Sotalol dose reduced to 120mg q24hrs due to CrCl.  - IV bumex  - holding losartan      Oral Candidiasis:  -Nystatin Sw/Sw x 5 days     HLD:  -lipitor     Chronic macrocytic anemia, acute thrombocytopenia:  Platelets 139. Dale 123 (5/22/23) TSH normal.  Hgb stable at ~11. Baseline 10-11.  B12/Folate high. Normal ptt, fibrinogen, and D Dimer makes DIC less likely.      Hypokalemia:  -monitor and replete per protocol  -  Clinically Significant Risk Factors      # Overweight: Estimated body mass index is 28.31 kg/m  as calculated from the following:  Height as of this encounter: 1.626 m (5' 4\").  Weight as of this encounter: 74.8 kg (164 lb 14.4 oz)., PRESENT ON ADMISSION    COVID-19 PCR Results        11/18/2021    08:58 1/20/2022    14:41 2/17/2022    09:04 5/18/2023    16:05   COVID-19 PCR Results   SARS CoV2 PCR Negative   Negative   Negative   Negative     COVID-19 Antibody Results, Testing for Immunity         No data to display               Code Status: Full Code  VTE prophylaxis:  Warfarin  DIET: Orders Placed This Encounter      Advance Diet as Tolerated: Regular Diet " Adult; 2 gm NA Diet    Drains/Lines: none  Weight bearing status: WBAT     Expected Discharge Date: 05/25/2023    Discharge Delays: IV Medication - consider oral or Home Infusion  Oxygen Needs - Arrange Home O2    Discharge Comments: k+ protocol      Subjective:  Patient feels better and denies SOB or CP , but when he starts talking his oxygen sats drop. He declines palliative at this time     PHYSICAL EXAM  Temp:  [97.5  F (36.4  C)-98.2  F (36.8  C)] 97.5  F (36.4  C)  Pulse:  [69-85] 72  Resp:  [19-20] 20  BP: (106-125)/(68-90) 125/85  SpO2:  [90 %-98 %] 90 %  Wt Readings from Last 1 Encounters:   05/24/23 117 kg (257 lb 15 oz)       Intake/Output Summary (Last 24 hours) at 5/24/2023 0831  Last data filed at 5/24/2023 0728  Gross per 24 hour   Intake 1425 ml   Output 3575 ml   Net -2150 ml      Body mass index is 32.24 kg/m .    Physical Exam  Constitutional:       Comments: Chronically ill-appearing    Cardiovascular:      Rate and Rhythm: Normal rate and regular rhythm.      Pulses: Normal pulses.   Pulmonary:      Effort: Pulmonary effort is normal. No respiratory distress.      Breath sounds: Normal breath sounds. No wheezing or rales.   Abdominal:      General: Bowel sounds are normal.      Palpations: Abdomen is soft.   Musculoskeletal:         General: Normal range of motion.   Skin:     General: Skin is warm and dry.      Capillary Refill: Capillary refill takes less than 2 seconds.   Neurological:      General: No focal deficit present.      Mental Status: He is alert and oriented to person, place, and time. Mental status is at baseline.       PERTINENT LABS/IMAGING:  Results for orders placed or performed during the hospital encounter of 05/18/23   XR Chest Port 1 View    Impression    IMPRESSION: Multi lead right subclavian venous pacemaker. Leads are intact and unchanged in position.     There are new, patchy airspace opacities in the right lower lobe suggestive of an area of pneumonitis/early  bronchopneumonia. Emphysematous changes are better appreciated on the CT.    No change in the cardiomegaly and mild pulmonary vascular congestion. No signs of failure.    CT Chest w/o Contrast    Impression    IMPRESSION:     1.  Unchanged bibasilar peripheral parenchymal scarring. No acute lung, airway, or pleural space abnormality.  2.  Biatrial and right ventricular heart enlargement. Dilated central pulmonary arteries. Findings are consistent with acute pulmonary hypertension. Calcification in the walls of the left atrium likely from prior ablation procedure.     Echocardiogram Complete   Result Value Ref Range    LVEF  55-60%    Echocardiogram Limited with Bubble Study   Result Value Ref Range    LVEF  55-60%        Recent Labs   Lab 05/24/23  0513 05/23/23  0438 05/22/23  0456 05/21/23  0431 05/20/23  1148 05/20/23  0427 05/19/23  0446 05/18/23  1203 05/18/23  0855   WBC  --  9.0 9.0 9.5  --  10.3 7.1  --  6.8   HGB  --  11.8* 11.0* 11.2*  --  10.9* 10.4*  --  11.3*   MCV  --  101* 101* 101*  --  105* 102*  --  103*   PLT  --  139* 123* 135*  --  142* 144*  --  149*   INR 2.30* 2.44* 2.96* 3.67*  --  3.71* 3.13*   < >  --    NA  --  143 145 144  --  144 141  --  144   POTASSIUM 3.5 3.7 3.8 3.6   < > 3.3* 3.6  --  4.0   CHLORIDE  --  102 105 106  --  106 103  --  106   CO2  --  30* 29 24  --  25 25  --  25   BUN  --  53.9* 55.7* 51.9*  --  48.2* 42.7*  --  38.2*   CR 1.64* 1.88* 1.96* 1.85*  --  1.82* 1.61*  --  1.64*   ANIONGAP  --  11 11 14  --  13 13  --  13   AMBIKA  --  8.9 8.5* 8.3*  --  8.5* 8.5*  --  9.1   GLC  --  147* 144* 151*  --  152* 164*  --  122*   ALBUMIN  --   --   --   --   --  3.3* 3.3*  --  3.7   PROTTOTAL  --   --   --   --   --   --  6.5  --  7.2   BILITOTAL  --   --   --   --   --   --  1.0  --  1.0   ALKPHOS  --   --   --   --   --   --  56  --  69   ALT  --   --   --   --   --   --  15  --  15   AST  --   --   --   --   --   --  24  --  24    < > = values in this interval not displayed.      Recent Labs   Lab Test 10/06/22  1240   CHOL 109   HDL 58   LDL 43   TRIG 39     Recent Labs   Lab Test 05/24/23  0513 05/23/23  0438   NA  --  143   POTASSIUM 3.5 3.7   CHLORIDE  --  102   CO2  --  30*   GLC  --  147*   BUN  --  53.9*   CR 1.64* 1.88*   GFRESTIMATED 43* 36*   AMBIKA  --  8.9     No results for input(s): A1C in the last 63826 hours.  Recent Labs   Lab Test 05/23/23  0438 05/22/23  0456 05/21/23  0431   HGB 11.8* 11.0* 11.2*     No results for input(s): TROPONINI in the last 99661 hours.  Recent Labs   Lab Test 05/18/23  0855 10/05/22  1002 05/17/22  1149 02/15/22  0941 02/09/22  1032 12/01/20  0958   BNP  --   --   --   --   --  265*   NTBNPI 3,644*  --   --   --   --   --    NTBNP  --  3,598* 2,976* 2,870*   < >  --     < > = values in this interval not displayed.     Recent Labs   Lab Test 05/18/23  0855   TSH 2.38     Recent Labs   Lab Test 05/24/23  0513 05/23/23 0438 05/22/23 0456   INR 2.30* 2.44* 2.96*       25 MINUTES SPENT BY ME on the date of service doing chart review, history, exam, documentation, discussion with nursing staff and specialist, & further activities per the note.  Sanjuanita Garnado MD  [facility]  [phone]

## 2023-05-24 NOTE — PLAN OF CARE
Problem: Heart Failure  Goal: Effective Oxygenation and Ventilation  Outcome: Progressing  Intervention: Promote Airway Secretion Clearance  Recent Flowsheet Documentation  Taken 5/24/2023 0840 by Jacqueline Issa, RN  Activity Management: activity adjusted per tolerance     Problem: Gas Exchange Impaired  Goal: Optimal Gas Exchange  Outcome: Progressing   Goal Outcome Evaluation:       Pt is AXOX4, on 10L NC with high flow tubing, paced rhythm without spikes, and assistx1 with a walker and a gait belt. K protocol, recheck in the AM. Denies pain or SOB. 1500 ml fluid restriction. Redness noted on glands, primofit removed and area cleaned, using urinal at the bedside.     Upon first assessment of the pt, sats ranged from low 80's-90's on 12L. Pt states it is just because he is eating. Put pt on 15L NC, pt was able to maintain sats around 85-93% at rest but de-sated when talking to around 85%. Called RT and gave an update.

## 2023-05-24 NOTE — PLAN OF CARE
Problem: Heart Failure  Goal: Effective Oxygenation and Ventilation  Outcome: Progressing  Intervention: Promote Airway Secretion Clearance  Recent Flowsheet Documentation  Taken 5/24/2023 0040 by Rizwana Rai RN  Cough And Deep Breathing: done independently per patient  Activity Management: activity adjusted per tolerance     Problem: Gas Exchange Impaired  Goal: Optimal Gas Exchange  Outcome: Progressing     Problem: COPD (Chronic Obstructive Pulmonary Disease) Comorbidity  Goal: Maintenance of COPD Symptom Control  Outcome: Progressing  Intervention: Maintain COPD-Symptom Control  Recent Flowsheet Documentation  Taken 5/24/2023 0040 by Rizwana Rai RN  Medication Review/Management: medications reviewed     Pt's vital signs were stable on 12 Lof O2 via nasal canula using high flow tubing. He does desat with activity. She denied pain or shortness of breath. He has been paced on telemetry. He has  primofit in place. It was leaking around 0000 so he was cleaned and a new one was applied. He is on the potassium protocol and it will be redrawn tomorrow. He is able to make needs known. Call light is within reach.

## 2023-05-24 NOTE — PLAN OF CARE
Problem: Heart Failure  Goal: Stable Heart Rate and Rhythm  Outcome: Progressing  Goal: Optimal Functional Ability  Intervention: Optimize Functional Ability  Recent Flowsheet Documentation  Taken 5/23/2023 1600 by Opal Valencia RN  Activity Management:    activity adjusted per tolerance    up in chair  Goal: Improved Oral Intake  Outcome: Progressing  Goal: Effective Oxygenation and Ventilation  Outcome: Progressing  Intervention: Promote Airway Secretion Clearance  Recent Flowsheet Documentation  Taken 5/23/2023 1600 by Opal Valencia RN  Cough And Deep Breathing: done independently per patient  Activity Management:    activity adjusted per tolerance    up in chair  Goal: Effective Breathing Pattern During Sleep  Outcome: Progressing  Intervention: Monitor and Manage Obstructive Sleep Apnea  Recent Flowsheet Documentation  Taken 5/23/2023 1600 by Opal Valencia RN  Medication Review/Management: medications reviewed     Problem: Gas Exchange Impaired  Goal: Optimal Gas Exchange  Outcome: Progressing   Goal Outcome Evaluation:       Pt is alert and oriented x 4, denies pain, has SOB with activity. On O2 at 11 LPM per HFNC, SpO2 ranges 92 to 97%. Decreased O2 to 10 LPM, SpO2 ranges 02 to 97%, tried to decreased O2 to 9 LPM but O2 saturation dropped to 80's. At HS, Increased O2 to 12 LPM, SpO2 ranges 93 to 94%. IS 1500 to 2000 ml.Lung sounds diminished. HR in the 70's, Paced rhythm without spikes. primofit in place, with good urine output.

## 2023-05-24 NOTE — PROGRESS NOTES
PALLIATIVE CARE SOCIAL WORK Progress Note   Location: [facility]    Joint visit with Palliative NP David Kulkarni. Met with Pt and spouse for check in and support. Pt reports feeling fine today, anxious to get home with home care. Talked about next steps and planning.Provided listening and support.     Plan: PCSW continues to follow.     Clinical Social Work Interventions:   Other: general emotional support      [facility]den [facility]  [facility], Palliative Care  Securely message with the [employer] Web Console (learn more here) or  Text page via SecureAuth Paging/Directory

## 2023-05-24 NOTE — PROGRESS NOTES
Care Management Follow Up    Length of Stay (days): 6    Expected Discharge Date: 05/25/2023     Concerns to be Addressed: medical progression; discharge planning  Patient plan of care discussed at interdisciplinary rounds: Yes     Anticipated Discharge Disposition:  Home with home care     Anticipated Discharge Services:   Home with home care  Anticipated Discharge DME: Oxygen    Patient/family educated on Medicare website which has current facility and service quality ratings: no  Education Provided on the Discharge Plan: Yes  Patient/Family in Agreement with the Plan:      Referrals Placed by CM/SW: Homecare  Private pay costs discussed: Not applicable    Additional Information:  3:25 pm  SW spoke to Josy at [facility] regarding Pt's referral. Josy requested that Pt's home care orders include permission to delay start of care up to five days due to the upcoming holiday weekend. JUDIE reported that CM will update agency once Pt is discharging.     VEE Johnston

## 2023-05-25 NOTE — PROGRESS NOTES
PALLIATIVE CARE PROGRESS NOTE  [facility]     Patient Name: Buck Sinclair  Date of Admission: 5/18/2023   Today the patient was seen for: [facility] follow-up      Recommendations & Counseling     Patient: Buck Sinclair  Date of Admission:  5/18/2023     Requesting Clinician / Team: Hospital medicine  Reason for consult: Goals of care      Recommendations & Counseling      GOALS OF CARE:     Life-prolonging currently without limits     Return home with home services     Would not want to be in long-term care    Salvador and family to discuss further his wishes and code status     Recommend OP palliative clinic follow-up for continued support and exploration of cares. (Referral placed today)    Tentatively planning for discharge tomorrow     ADVANCE CARE PLANNING:    No health care directive on file. Per  informed consent policy next of kin should be involved if patient becomes unable.    There is no POLST form on file, plan to complete prior to DC.    Code status: Full Code     MEDICAL MANAGEMENT:   #Dyspnea,Pulmonary Hypertension, HFrEF     Medical and diuretic management per primary team      Oxygen, baseline 6-10 lpm. Has been on 10L for over 2 weeks     Repositioning    Fan at bedside     #General Weakness/Debility     Physical Therapy    Occupational therapy  Therapy Recommendations:anticipate discharge to home with home care services     PSYCHOSOCIAL/SPIRITUAL:    Family     Friends    [place] community: Jainism      Palliative Care will continue to follow. Thank you for the consult and allowing us to aid in the care of Buck Sinclair.         David Kulkarni NP  Securely message with [employer] (more info)  Text page via [facility] Paging/Directory           Assessments             Buck Sinclair is a 78 year old male who has a past medical history significant for HFrEF, status post dual-chamber ICD, CAD, hypertension, persistent atrial fibrillation, chronic hypoxic respiratory  Procedure Scheduling Nurse-    Patient called into the office to Schedule Procedure with Montana Clemons MD, Requesting to have a Nurse contact back to assist.    Best Contact: [phone]      Thank you!     failure most recently 10 L PNC, severe COPD who presented to the emergency department due to acute on chronic shortness of breath.  Patient has had progressively worsening dyspnea on exertion along with this paroxysmal nocturnal dyspnea and edema/fluid retention.  No chest pain.  No cough.  No abdominal pain.  No melena, hematochezia or hematemesis.  Patient was briefly seen in the pulmonology clinic by Dr. Payton this morning and upon patient's arrival he looked cyanotic, tachypneic and his SPO2 was in the 70s on 10 L/min.  Ambulance contacted right away and transported patient to the emergency department.  In the emergency department, patient was noted to be on respiratory distress and was therefore placed on BiPAP which has now been weaned down to 6 L nasal cannula.  Admitted for further evaluation and management  Prognosis, Goals, or Advance Care Planning was addressed today with: Yes.  Mood, coping, and/or meaning in the context of serious illness were addressed today: Yes.              Interval History:     Chart review/discussion with unit or clinical team members:   Course reviewed. No significant events overnight, now down to 8 L    Per patient or family/caregivers today:  No acute changes. Dyspnea stable. Feels close to baseline. Feeling comfortable.  Plan to discharge home tomorrow.            Review of Systems:     Besides above, an additional  system ROS was reviewed and is unremarkable               Physical Exam:   Temp: 97.5  F (36.4  C) Temp src: Oral Temp  Min: 97.4  F (36.3  C)  Max: 97.9  F (36.6  C) BP: 111/74 Pulse: 70   Resp: 20 SpO2: 94 % O2 Device: Nasal cannula with humidification Oxygen Delivery: 5 LPM  Vital Signs with Ranges  Temp:  [97.4  F (36.3  C)-97.9  F (36.6  C)] 97.5  F (36.4  C)  Pulse:  [70-93] 70  Resp:  [20-22] 20  BP: (111-129)/(74-90) 111/74  SpO2:  [90 %-100 %] 94 %  257 lbs 3.2 oz    Physical Exam  Vitals and nursing note reviewed.   Constitutional:       General: He is  not in acute distress.  HENT:      Head: Normocephalic.      Mouth/Throat:      Mouth: Mucous membranes are moist.      Pharynx: Oropharynx is clear.   Eyes:      Extraocular Movements: Extraocular movements intact.      Conjunctiva/sclera: Conjunctivae normal.      Pupils: Pupils are equal, round, and reactive to light.   Cardiovascular:      Rate and Rhythm: Normal rate. Rhythm irregular.      Pulses: Normal pulses.   Pulmonary:      Effort: Pulmonary effort is normal. No respiratory distress.      Breath sounds: No wheezing.   Abdominal:      General: Abdomen is flat. There is no distension.      Tenderness: There is no abdominal tenderness.   Musculoskeletal:         General: Normal range of motion.   Skin:     General: Skin is warm and dry.      Capillary Refill: Capillary refill takes less than 2 seconds.   Neurological:      General: No focal deficit present.      Mental Status: He is alert. Mental status is at baseline.   Psychiatric:         Mood and Affect: Mood normal.         Thought Content: Thought content normal.                  Current Problem List:   Principal Problem:    Acute on chronic congestive heart failure, unspecified heart failure type (H)  Active Problems:    Cardiomyopathy (H)    Essential hypertension    Persistent Atrial Fibrillation    Chronic heart failure with preserved ejection fraction (H)    CKD (chronic kidney disease) stage 3, GFR 30-59 ml/min (H)      Allergies   Allergen Reactions     Adhesive Tape Other (See Comments)     ADHESIVE TAPE; SKIN IRRITATION; Skin pulled off with foam tape       Amiodarone      ADVERSE REACTION.  Sunlight sensitivity.     Lisinopril             Data Reviewed:       Data   Results for orders placed or performed during the hospital encounter of 05/18/23 (from the past 24 hour(s))   CBC with Platelets & Differential    Narrative    The following orders were created for panel order CBC with Platelets & Differential.  Procedure                                Abnormality         Status                     ---------                               -----------         ------                     CBC with platelets and d...[[id number]]  Abnormal            Final result                 Please view results for these tests on the individual orders.   INR   Result Value Ref Range    INR 2.70 (H) 0.85 - 1.15   Basic metabolic panel   Result Value Ref Range    Sodium 144 136 - 145 mmol/L    Potassium 3.7 3.4 - 5.3 mmol/L    Chloride 101 98 - 107 mmol/L    Carbon Dioxide (CO2) 34 (H) 22 - 29 mmol/L    Anion Gap 9 7 - 15 mmol/L    Urea Nitrogen 52.4 (H) 8.0 - 23.0 mg/dL    Creatinine 1.60 (H) 0.67 - 1.17 mg/dL    Calcium 8.8 8.8 - 10.2 mg/dL    Glucose 117 (H) 70 - 99 mg/dL    GFR Estimate 44 (L) >60 mL/min/1.73m2   Magnesium   Result Value Ref Range    Magnesium 2.2 1.7 - 2.3 mg/dL   Nt probnp inpatient   Result Value Ref Range    N terminal Pro BNP Inpatient 11,084 (H) 0 - 1,800 pg/mL   CBC with platelets and differential   Result Value Ref Range    WBC Count 10.9 4.0 - 11.0 10e3/uL    RBC Count 3.93 (L) 4.40 - 5.90 10e6/uL    Hemoglobin 12.2 (L) 13.3 - 17.7 g/dL    Hematocrit 39.0 (L) 40.0 - 53.0 %    MCV 99 78 - 100 fL    MCH 31.0 26.5 - 33.0 pg    MCHC 31.3 (L) 31.5 - 36.5 g/dL    RDW 15.9 (H) 10.0 - 15.0 %    Platelet Count 134 (L) 150 - 450 10e3/uL    % Neutrophils 74 %    % Lymphocytes 14 %    % Monocytes 10 %    % Eosinophils 1 %    % Basophils 0 %    % Immature Granulocytes 1 %    NRBCs per 100 WBC 0 <1 /100    Absolute Neutrophils 8.2 1.6 - 8.3 10e3/uL    Absolute Lymphocytes 1.5 0.8 - 5.3 10e3/uL    Absolute Monocytes 1.1 0.0 - 1.3 10e3/uL    Absolute Eosinophils 0.1 0.0 - 0.7 10e3/uL    Absolute Basophils 0.0 0.0 - 0.2 10e3/uL    Absolute Immature Granulocytes 0.1 <=0.4 10e3/uL    Absolute NRBCs 0.0 10e3/uL   XR Chest 2 Views    Narrative    EXAM: XR CHEST 2 VIEWS  LOCATION: [facility]  DATE/TIME: 5/25/2023 8:09 AM CDT    INDICATION: Rule out  chronic thromboembolic disease  COMPARISON: 05/19/2023      Impression    IMPRESSION: Patchy bibasilar opacities are favored to reflect atelectasis. No superimposed focal consolidation. Please note that x-ray cannot rule out thromboembolic disease. If there is clinical concern, a PE chest CT is recommended.   NM Lung Scan Perfusion Particulate    Narrative    EXAM: NM LUNG SCAN PERFUSION PARTICULATE  LOCATION: [facility]  DATE/TIME: 5/25/2023 8:11 AM CDT    INDICATION: Shortness of breath.  COMPARISON: Chest CT 05/19/2023, chest x-ray 05/18/2023.  TECHNIQUE: 8.18 mCi technetium-99m MAA, IV. Standard lung perfusion imaging.    FINDINGS: No segmental perfusion defect. There are 2, tiny subsegmental perfusion defects in the left lung. Right-sided defect corresponding to patient's cardiac assist device.      Impression    IMPRESSION:     Low probability of pulmonary embolism.                    *Note: Due to a large number of results and/or encounters for the requested time period, some results have not been displayed. A complete set of results can be found in Results Review.   *      -----------------------------------------------------------------------------------------------------------------          ====================================================  TT: I have personally spent a total of 45 minutes on the date of the encounter,  on the unit in review of medical record, consultation with the medical providers and assessment of Buck Sinclair  today, with more than 50% of this time spent in counseling, coordination of care, and discussion re: diagnostic results, prognosis, symptom management, risks and benefits of management options, and development of plan of care as noted above.      ====================================================

## 2023-05-25 NOTE — PROGRESS NOTES
Patient remained on 10 HFNC sating 93%.Xopenex/Atrovent nebulizer given x 2. RT will continue to monitor and give nebs as ordered.

## 2023-05-25 NOTE — PROGRESS NOTES
[facility] Med technician called at 1630H to inform that VQ scan will be done in AM at 0730 H.

## 2023-05-25 NOTE — PLAN OF CARE
Problem: Heart Failure  Goal: Stable Heart Rate and Rhythm  Outcome: Progressing     Problem: Heart Failure  Goal: Improved Oral Intake  Outcome: Progressing     Problem: Heart Failure  Goal: Effective Oxygenation and Ventilation  Outcome: Progressing  Intervention: Promote Airway Secretion Clearance  Recent Flowsheet Documentation  Taken 5/25/2023 1600 by Jaqueline Issa RN  Activity Management: up in chair     Problem: Gas Exchange Impaired  Goal: Optimal Gas Exchange  Outcome: Progressing     Problem: Plan of Care - These are the overarching goals to be used throughout the patient stay.    Goal: Absence of Hospital-Acquired Illness or Injury  Intervention: Prevent Skin Injury  Recent Flowsheet Documentation  Taken 5/25/2023 1600 by Jaqueline Issa, RN  Body Position: position changed independently     Goal Outcome Evaluation:       Pt is AOX4 and denies pain. Lung sounds are clear and diminished. Pt denies SOB, but noted SOB with activity. O2 sats low 90's on 5L HFNC. Paced rhythm w/o spikes and underlying A-fib. HR in the 90's. He is voiding well and using the urinal. Stage one pressure ulcer noted on left butt cheek, covered with mepilex. He has been up in the chair this evening. Wife at bedside.

## 2023-05-25 NOTE — PROGRESS NOTES
Pulmonary Progress Note  5/25/2023      Admit Date: 5/18/2023  CODE: Full Code    Reason for Consult: acute on chronic respiratory failure with hypoxemia, HFpEF with exacerbation, RV failure and pulm HTN.     Assessment/Plan:   78 year old male with history of chronic hypoxic respiratory failure on 10 L/min NC since December 2022, COPD, history of environmental exposures (Agent Orange, dust/fire inhalation), remote tobacco use (quit 1968), HTN, coronary artery disease s/p PCI/stents, atrial fibrillation s/p PVL with ICD/PPM on chronic anticoagulation with warfarin, RV dysfunction, sent to the ER from pulm clinic on 5/18 with cyanosis, SpO2 in low 70s, worsening SOB. Profoundly volume overloaded, likely due to dietary noncompliance (drinkgin too much water and eating a lot of salt).  Significant improvement in breathing and O2 requirement, now down to 5Lpm which is close to his baseline.     Recommendations:  - titrate FiO2 for goal SpO2 88-92%, avoid hyperoxia. Home O2 eval prior to discharge, to establish his new baseline  - prednisone taper ordered  - continue inhalers as ordered  - diuretics, volume status mgmt per cardiology and [facility]  - will arrange for hospital discharge follow up in pulmonary clinic with me or one of our NP's     Getting close to discharge - potentially tomorrow. Discussed with Dr. Granado ([facility]).     Hugh Payton MD (Avi)  [facility] Pulmonary & Critical Care  Pager [phone]  Clinic [phone]  Fax [phone]     Subjective/Interim Events:   He is feeling much better since admission.  Was on 10Lpm this AM -> down to 7.5Lpm, SpO2 low 90s    Has diuresed over 12L net negative, and weight down almost 40lbs.       Medications:       Continuing ACE inhibitor/ARB/ARNI from home medication list OR ACE inhibitor/ARB order already placed during this visit       - MEDICATION INSTRUCTIONS -         aspirin  81 mg Oral Daily     atorvastatin  40 mg Oral QPM      "bumetanide  2 mg Intravenous Q8H     [START ON 5/26/2023] bumetanide  3 mg Oral BID     doxycycline monohydrate  100 mg Oral Q12H JOSE (08/20)     FLUoxetine  20 mg Oral Daily     [Held by provider] fluticasone-vilanterol  1 puff Inhalation Daily     guaiFENesin  600 mg Oral BID     ipratropium  0.5 mg Nebulization 4x daily     levalbuterol  1.25 mg Nebulization 4x Daily     [Held by provider] losartan  25 mg Oral Daily     nystatin  1,000,000 Units Swish & Swallow 4x Daily     pantoprazole  40 mg Oral QAM AC     polyethylene glycol  17 g Oral Daily     [START ON 5/29/2023] predniSONE  10 mg Oral Daily     [START ON 5/26/2023] predniSONE  20 mg Oral Daily     senna-docusate  2 tablet Oral BID     sodium chloride  1 spray Both Nostrils 4x Daily     sodium chloride (PF)  3 mL Intracatheter Q8H     sotalol  120 mg Oral Q24H     umeclidinium  1 puff Inhalation Daily     warfarin ANTICOAGULANT  1.25 mg Oral ONCE at 18:00     Warfarin Therapy Reminder  1 each Oral See Admin Instructions         Exam/Data:   Vitals  /74 (BP Location: Right arm)   Pulse 70   Temp 97.5  F (36.4  C) (Oral)   Resp 20   Ht 1.905 m (6' 3\")   Wt 116.7 kg (257 lb 3.2 oz)   SpO2 94%   BMI 32.15 kg/m       I/O last 3 completed shifts:  In: 1281 [P.O.:1278; I.V.:3]  Out: 3150 [Urine:3150]  Weight change: 0 kg (0 lb)  [unfilled]  Resp: 20      EXAM:  Physical Exam  Gen: awake, alert, oriented, no distress  HEENT: NT, no ERLINDA  CV: RRR, no m/g/r  Resp: CTAB  Abd: soft, nontender, BS+  Skin: no rashes or lesions  Ext: 1+ pitting edema bilaterally.   Neuro: PERRL, nonfocal exam    ROS:  A 10-system review was obtained and is negative with the exception of the symptoms noted above.    DATA:    PFT DATA  Previously reviewed.      IMAGING:   CT Chest w/o Contrast    Result Date: 5/19/2023  EXAM: CT CHEST W/O CONTRAST LOCATION: [facility] DATE/TIME: 5/19/2023 10:38 AM CDT INDICATION: dyspnea, hypoxia, CHF, possible " pneumonia COMPARISON: CT of the chest 08/17/2022 TECHNIQUE: CT chest without IV contrast. Multiplanar reformats were obtained. Dose reduction techniques were used. CONTRAST: None. FINDINGS: LUNGS AND PLEURA: Unchanged parenchymal heterogeneity relatively lucent apices and hazy groundglass attenuation within the bases. There are areas of subpleural architectural distortion and opacity in both bases consistent with cicatrization atelectasis. Such opacity in the medial right lower lobe is associated with crowding of bronchovascular structures and a few internal air bronchograms. These findings are unchanged. No new airspace opacities or interlobular septal thickening. Trachea and central airways are patent and normal caliber. No endoluminal airway material. MEDIASTINUM: Right subclavian approach dual chamber pacemaker has right atrial appendage and right ventricular leads. Severe enlargement of the right atrium. Mild enlargement of the left atrium. There are left atrial wall calcifications, likely sequela of prior ablation procedure. Dilated main and central pulmonary arteries. Mild calcification of aortic and mitral annuli. No pericardial effusion or thickening. Hilar and mediastinal lymph nodes are normal by size criteria. Esophagus is decompressed. CORONARY ARTERY CALCIFICATION: Severe. UPPER ABDOMEN: Symmetric renal atrophy. MUSCULOSKELETAL: Unchanged thoracic spine degenerative osteophytes from T3 through the thoracolumbar junction. No vertebral compression deformity. There is a right shoulder joint replacement.     IMPRESSION: 1.  Unchanged bibasilar peripheral parenchymal scarring. No acute lung, airway, or pleural space abnormality. 2.  Biatrial and right ventricular heart enlargement. Dilated central pulmonary arteries. Findings are consistent with acute pulmonary hypertension. Calcification in the walls of the left atrium likely from prior ablation procedure.     Echocardiogram Complete    Result Date:  2023  [id number]^EDWIN^ROSA^[place] [address]  Name: ALEJO BROWN MRN: [id number] : 1945 Study Date: 2023 01:34 PM Age: 78 yrs Gender: Male Patient Location: [facility] Reason For Study: Heart Failure Ordering Physician: ROSA PINEDA Performed By: JAI  BSA: 2.6 m2 Height: 75 in Weight: 288 lb BP: 112/77 mmHg ______________________________________________________________________________ Procedure Complete Portable Echo Adult. Definity (NDC #74068-537) given intravenously. ______________________________________________________________________________ Interpretation Summary  Left ventricular size, wall motion and function are normal. The ejection fraction is 55-60%. The right ventricle is moderately dilated. Moderately decreased right ventricular systolic function The left atrium is moderately dilated. The right atrium is severely dilated. There is moderate to mod-severe (2-3+) tricuspid regurgitation. Severe (>55mmHg) pulmonary hypertension is present. IVC diameter >2.1 cm collapsing <50% with sniff suggests a high RA pressure estimated at 15 mmHg or greater. ______________________________________________________________________________ Left Ventricle Left ventricular size, wall motion and function are normal. The ejection fraction is 55-60%. There is normal left ventricular wall thickness. Left ventricular diastolic function is abnormal. No regional wall motion abnormalities noted.  Right Ventricle The right ventricle is moderately dilated. Moderately decreased right ventricular systolic function. There is a pacemaker lead in the right ventricle.  Atria The left atrium is moderately dilated. The right atrium is severely dilated. There is no color Doppler evidence of an atrial shunt.  Mitral Valve There is mild mitral annular calcification. There is mild (1+) mitral regurgitation.  Tricuspid Valve Tricuspid valve fails  to coapt. There is moderate to mod-severe (2-3+) tricuspid regurgitation. The right ventricular systolic pressure is approximated at 48.7 mmHg plus the right atrial pressure. Severe (>55mmHg) pulmonary hypertension is present.  Aortic Valve There is mild trileaflet aortic sclerosis.  Pulmonic Valve The pulmonic valve is not well seen, but is grossly normal. This degree of valvular regurgitation is within normal limits.  Vessels The aorta root is normal. Normal size ascending aorta. IVC diameter >2.1 cm collapsing <50% with sniff suggests a high RA pressure estimated at 15 mmHg or greater.  Pericardium There is no pericardial effusion.  ______________________________________________________________________________ MMode/2D Measurements & Calculations IVSd: 0.97 cm LVIDd: 5.5 cm LVIDs: 3.9 cm LVPWd: 1.1 cm  FS: 29.2 % LV mass(C)d: 223.2 grams LV mass(C)dI: 87.1 grams/m2 Ao root diam: 3.8 cm LA dimension: 5.5 cm asc Aorta Diam: 3.5 cm LA/Ao: 1.4 LVOT diam: 2.4 cm LVOT area: 4.5 cm2 LA Volume Indexed (AL/bp): 48.8 ml/m2 RV Base: 5.6 cm RWT: 0.41 TAPSE: 1.6 cm  Time Measurements MM HR: 68.0 BPM  Doppler Measurements & Calculations MV E max goldy: 129.0 cm/sec MV A max goldy: 24.5 cm/sec MV E/A: 5.3 MV dec time: 0.18 sec LV V1 max P.8 mmHg LV V1 max: 67.1 cm/sec LV V1 VTI: 12.6 cm MR PISA: 6.3 cm2 MR ERO: 0.45 cm2 MR volume: 61.8 ml SV(LVOT): 57.0 ml SI(LVOT): 22.2 ml/m2 PA acc time: 0.03 sec TR max goldy: 349.0 cm/sec TR max P.7 mmHg Lateral E/e': 8.9 RV S Goldy: 8.3 cm/sec  ______________________________________________________________________________ Report approved by: Nalini Bowling 2023 02:46 PM       XR Chest Port 1 View    Result Date: 2023  EXAM: XR CHEST PORT 1 VIEW LOCATION: [facility] DATE/TIME: 2023 9:20 AM CDT INDICATION: Dyspnea COMPARISON: CT chest 2022 and multiple prior studies, chest x-ray 2022, 10/25/2018     IMPRESSION: Multi lead right  subclavian venous pacemaker. Leads are intact and unchanged in position. There are new, patchy airspace opacities in the right lower lobe suggestive of an area of pneumonitis/early bronchopneumonia. Emphysematous changes are better appreciated on the CT. No change in the cardiomegaly and mild pulmonary vascular congestion. No signs of failure.

## 2023-05-25 NOTE — PLAN OF CARE
Problem: Heart Failure  Goal: Stable Heart Rate and Rhythm  Outcome: Progressing  Goal: Optimal Functional Ability  Intervention: Optimize Functional Ability  Recent Flowsheet Documentation  Taken 5/24/2023 1645 by Opal Valencia RN  Activity Management:    up in chair    activity adjusted per tolerance  Goal: Improved Oral Intake  Outcome: Progressing  Goal: Effective Oxygenation and Ventilation  Outcome: Progressing  Intervention: Promote Airway Secretion Clearance  Recent Flowsheet Documentation  Taken 5/24/2023 1645 by Opal Valencia RN  Cough And Deep Breathing: done independently per patient  Activity Management:    up in chair    activity adjusted per tolerance  Goal: Effective Breathing Pattern During Sleep  Outcome: Progressing  Intervention: Monitor and Manage Obstructive Sleep Apnea  Recent Flowsheet Documentation  Taken 5/24/2023 1645 by Opal Valencia RN  Medication Review/Management: medications reviewed     Problem: Gas Exchange Impaired  Goal: Optimal Gas Exchange  Outcome: Progressing   Goal Outcome Evaluation:       Pt is alert and oriented x 4, denies pain, still has SOB with activity. Lung sounds diminished, on O2 at 12 LPM per HFNC, SpO2 was 92 to 97%, decreased to 10 LPM, SpO2 90 to 95%, RT informed . IS 1500 to 2000 ml. Coughing noted, with minimal sputum per pt's report. HR in the 60's - 70's, paced rhythm with underlying Afib. Pt still has no BM since 2 days ago, already took Miralax this am. Encouraged him to take the stool softener tonight but he refused. Pt was sitting in the recliner since the start of the shift and went to bed around 2145 H. Three red spots in the left butt cheek and one in the left butt cheek noted. Applied lotion in the butt and covered the 3 spots with mepilex. Pt turned to sides when he is in bed. O2 saturation dropped to 70's to 80's when he went back to bed, O2 increased to 15 LPM to keep above 90's. It took about 15 minutes to  recover. Pt desaturates to 80's when O2 was at 10 LPM, increased to 12 lpm, SpO2 now came up to 94 %. Pt requested to place primofit tonight because he is on IV Bumex.

## 2023-05-25 NOTE — PROGRESS NOTES
RESPIRATORY CARE NOTE   Patient is on 6L nasal cannula, BS diminished, gave Xopenex/Atrovent nebulizer treatment QID with flutter valve, BS post treatment unchanged, patient tolerated well.     VINCENT MONROE, RT

## 2023-05-25 NOTE — PROGRESS NOTES
[facility]    PROGRESS NOTE - Hospitalist Service    Assessment and Plan    Principal Problem:    Acute on chronic congestive heart failure, unspecified heart failure type (H)  Active Problems:    Cardiomyopathy (H)    Essential hypertension    Persistent Atrial Fibrillation    Chronic heart failure with preserved ejection fraction (H)    CKD (chronic kidney disease) stage 3, GFR 30-59 ml/min (H)    Buck Sinclair is a 78 year old male with h/o hypertension, persistent atrial fibrillation, CAD, COPD, and CHF who presents to this ED by ambulance for evaluation of shortness of breath.       HFrEF with ADHF: deemed nonischemic dilated cardiomyopathy.    -TTE (9/13/2022) LVEF 55 to 60%, moderate TI, global RV function mild-moderately reduced, RSVP approximately 73 mmHg indicative of moderate pulmonary hypertension, estimated RA pressure 15 mmHg or greater.  -TTE (5/18/23) showed LVEF 55-60%, RV mod dilated, mod decreased RVS function, mod LAD, severe RAD, 2-3+ TR, severe PHTN, ~RAP >15mmHg  -Net negative 11.9 liters   - put out over 3 liters overnight with IV bumex and renal function slightly improved, decreased O2 need  - continue Bumex IV 2mg Q8hrs will give for 2 more doses and restart oral in am  - notified cardiology and she recommends when transitioning to PO continue 3mg BID as recommended  -recheck BNP 11,000 and was 3600 on admission   - trend renal function  -strict I/O, daily weights (todays weight 117kg and was on 5/24 does not correlate with 3 liters out in the last 24hrs)  - holding ARB for now so can push diuresis      Acute on chronic hypoxic respiratory failure  Severe COPD   Moderate-to-severe pulmonary hypertension  -has been on oxygen chronically and for past several months was using 10L PNC?  -Acute decline due to volume overload in the setting of poor cardiopulmonary status chronically.  -required initially BiPAP on admission followed by HFNC and weaned to 8L on  5/22.  -Chest x-ray in emergency department showed new patchy airspace opacities in right lower lobe suggestive of area of pneumonitis/early proximal pneumonia.  Emphysematous changes. Cardiomegaly and mild pulmonary vascular congestion.    -CT Chest w/o contrast due to CHRISSY (5/19/23) showed Unchanged bibasilar peripheral parenchymal scarring. No acute lung, airway, or pleural space abnormality. Biatrial and right ventricular heart enlargement. Dilated central pulmonary arteries.   -Repeat limited TTE with bubble study performed on 5/21/23 which was negative for interatrial shunt.  - hold Breo-Ellipta and Incruse Ellipta while on scheduled xopenex/atrovent nebs TID  -prednisone taper as ordered  -discussed with Pulm and agrees with diuresing and switching to PO tomorrow and reassess  - bronchial hygiene ordered   - VQ scan low probability for PE  - consider repeat sleep study despite 2015 unremarkable with elevated pulm HTN referral at discharge      Possible CAP:   -Chest x-ray in emergency department showed new patchy airspace opacities in right lower lobe suggestive of area of pneumonitis/early proximal pneumonia.  Emphysematous changes. Cardiomegaly and mild pulmonary vascular congestion.    -CT Chest w/o contrast due to CHRISSY (5/19/23) showed Unchanged bibasilar peripheral parenchymal scarring. No acute lung, airway, or pleural space abnormality. Biatrial and right ventricular heart enlargement. Dilated central pulmonary arteries.   -Blood culture x2 ngtd, procalcitonin 0.01, respiratory panel PCR negative, MRSA nares negative.   -Empiric IV zosyn/Doxy stopped 5/23  -Augmentin/Doxy x 5 days from 5/23 will finish course for completeness      Coronary artery disease: S/P CANDELARIA x3.  Coronary angiogram 1/24/2022 showed mild CAD with patent LAD stents in the proximal and mid LAD, left circumflex third obtuse marginal branch 30% stenosis, mid RCA lesion 20% stenosis.  LVEDP and PCWP normal.  PAP 66/24 mmHg with a mean  "pressure of 37 mmHg.  Shikha and TD cardiac outputs both 5.7 L/min.  -On warfarin, sotalol, statin  -start ASA 81mg qd  - No anginal complaints  - Troponin T-HS 31 ->30     Medtronic dual-chamber implantable cardiac defibrillator: -Indication:  primary prevention of sudden cardiac death.  -placed on 6/11/2014 with generator change on 10/28/2022  -device interrogation on 5/16/23 reviewed.      Persistent atrial fibrillation;  Supratherapeutic anticoagulation- resolved.  -per Dr. Garrett's last note, status post multiple electrical cardioversions, most recently on 11/19/2021. He underwent complex catheter ablation x2 in 2011. Overall fairly low burden of atrial arrhythmia based on his device interrogations. [id number] score is at least 5 (congestive heart failure, hypertension, age 75 or greater, coronary artery disease).  -warfarin, pharmacy to dose  --Sotalol dose reduced to 120mg daily     CHRISSY on CKD stage III: CHRISSY due to IV diuresis with net negative fluid loss of .  Admission Cr 1.64 carlo to peak of 1.96 (5/22) -> 1.88 today after IV diuresis was stopped on 5/22.  Baseline creatinine 1.5-1.6.   -hold Losartan until renal recovery near baseline then resume  - IV bumex restarted  - check renal function actually improved today      HTN:  -Sotalol dose reduced to 120mg q24hrs due to CrCl.  - IV bumex for now   - holding losartan in order to diurese      Oral Candidiasis:  -Nystatin Sw/Sw x 5 days     HLD:  -lipitor     Chronic macrocytic anemia, acute thrombocytopenia:  Platelets 139. Dale 123 (5/22/23) TSH normal.  Hgb stable at ~11. Baseline 10-11.  B12/Folate high. Normal ptt, fibrinogen, and D Dimer makes DIC less likely.      Hypokalemia:  -monitor and replete per protocol  -  Clinically Significant Risk Factors      # Overweight: Estimated body mass index is 28.31 kg/m  as calculated from the following:  Height as of this encounter: 1.626 m (5' 4\").  Weight as of this encounter: 74.8 kg (164 lb 14.4 oz)., " PRESENT ON ADMISSION    COVID-19 PCR Results        11/18/2021    08:58 1/20/2022    14:41 2/17/2022    09:04 5/18/2023    16:05   COVID-19 PCR Results   SARS CoV2 PCR Negative   Negative   Negative   Negative     COVID-19 Antibody Results, Testing for Immunity         No data to display               Code Status: Full Code  VTE prophylaxis:  Warfarin  DIET: Orders Placed This Encounter      Advance Diet as Tolerated: Regular Diet Adult; 2 gm NA Diet    Drains/Lines: none  Weight bearing status: WBAT     Expected Discharge Date: 05/26/2023    Discharge Delays: IV Medication - consider oral or Home Infusion  Lab Result Pending (enter specific test & time in comments)    Discharge Comments: needs further diuresis and possible discharge tomorrow.pending decreased oxygen      Subjective:  decreased O2 need and sats are not dropping when speaking or minimal movement, wife present and reviewed low sodium life style and he likes to salt watermelon, fruits etc and informed her that he needs to refrain from salt use    PHYSICAL EXAM  Temp:  [97.4  F (36.3  C)-97.9  F (36.6  C)] 97.5  F (36.4  C)  Pulse:  [70-93] 79  Resp:  [20-22] 20  BP: (111-129)/(74-90) 111/74  SpO2:  [90 %-100 %] 93 %  Wt Readings from Last 1 Encounters:   05/25/23 116.7 kg (257 lb 3.2 oz)       Intake/Output Summary (Last 24 hours) at 5/24/2023 0831  Last data filed at 5/24/2023 0728  Gross per 24 hour   Intake 1425 ml   Output 3575 ml   Net -2150 ml      Body mass index is 32.15 kg/m .    Physical Exam  Constitutional:       Comments: Chronically ill-appearing, NAD, less winded when speaking    Cardiovascular:      Rate and Rhythm: Normal rate and regular rhythm.      Pulses: Normal pulses.   Pulmonary:      Comments: Mild tachypnea, but able to complete full sentences and O2 sats not dropping, trace crackles right base but otherwise clear  Abdominal:      General: Bowel sounds are normal.      Palpations: Abdomen is soft.   Musculoskeletal:          General: Normal range of motion.      Comments: BLE edema   Skin:     General: Skin is warm and dry.      Capillary Refill: Capillary refill takes less than 2 seconds.   Neurological:      General: No focal deficit present.      Mental Status: He is alert and oriented to person, place, and time. Mental status is at baseline.         PERTINENT LABS/IMAGING:  Results for orders placed or performed during the hospital encounter of 05/18/23   XR Chest Port 1 View    Impression    IMPRESSION: Multi lead right subclavian venous pacemaker. Leads are intact and unchanged in position.     There are new, patchy airspace opacities in the right lower lobe suggestive of an area of pneumonitis/early bronchopneumonia. Emphysematous changes are better appreciated on the CT.    No change in the cardiomegaly and mild pulmonary vascular congestion. No signs of failure.    CT Chest w/o Contrast    Impression    IMPRESSION:     1.  Unchanged bibasilar peripheral parenchymal scarring. No acute lung, airway, or pleural space abnormality.  2.  Biatrial and right ventricular heart enlargement. Dilated central pulmonary arteries. Findings are consistent with acute pulmonary hypertension. Calcification in the walls of the left atrium likely from prior ablation procedure.     Echocardiogram Complete   Result Value Ref Range    LVEF  55-60%    Echocardiogram Limited with Bubble Study   Result Value Ref Range    LVEF  55-60%        Recent Labs   Lab 05/25/23  0444 05/24/23  0513 05/23/23  0438 05/22/23  0456 05/20/23  1148 05/20/23  0427 05/19/23  0446   WBC 10.9  --  9.0 9.0   < > 10.3 7.1   HGB 12.2*  --  11.8* 11.0*   < > 10.9* 10.4*   MCV 99  --  101* 101*   < > 105* 102*   *  --  139* 123*   < > 142* 144*   INR 2.70* 2.30* 2.44* 2.96*   < > 3.71* 3.13*     --  143 145   < > 144 141   POTASSIUM 3.7 3.5 3.7 3.8   < > 3.3* 3.6   CHLORIDE 101  --  102 105   < > 106 103   CO2 34*  --  30* 29   < > 25 25   BUN 52.4*  --  53.9* 55.7*    < > 48.2* 42.7*   CR 1.60* 1.64* 1.88* 1.96*   < > 1.82* 1.61*   ANIONGAP 9  --  11 11   < > 13 13   AMBIKA 8.8  --  8.9 8.5*   < > 8.5* 8.5*   *  --  147* 144*   < > 152* 164*   ALBUMIN  --   --   --   --   --  3.3* 3.3*   PROTTOTAL  --   --   --   --   --   --  6.5   BILITOTAL  --   --   --   --   --   --  1.0   ALKPHOS  --   --   --   --   --   --  56   ALT  --   --   --   --   --   --  15   AST  --   --   --   --   --   --  24    < > = values in this interval not displayed.     Recent Labs   Lab Test 10/06/22  1240   CHOL 109   HDL 58   LDL 43   TRIG 39     Recent Labs   Lab Test 05/24/23 0513 05/23/23 0438   NA  --  143   POTASSIUM 3.5 3.7   CHLORIDE  --  102   CO2  --  30*   GLC  --  147*   BUN  --  53.9*   CR 1.64* 1.88*   GFRESTIMATED 43* 36*   AMBIKA  --  8.9     No results for input(s): A1C in the last 10610 hours.  Recent Labs   Lab Test 05/23/23 0438 05/22/23  0456 05/21/23  0431   HGB 11.8* 11.0* 11.2*     No results for input(s): TROPONINI in the last 70492 hours.  Recent Labs   Lab Test 05/18/23  0855 10/05/22  1002 05/17/22  1149 02/15/22  0941 02/09/22  1032 12/01/20  0958   BNP  --   --   --   --   --  265*   NTBNPI 3,644*  --   --   --   --   --    NTBNP  --  3,598* 2,976* 2,870*   < >  --     < > = values in this interval not displayed.     Recent Labs   Lab Test 05/18/23  0855   TSH 2.38     Recent Labs   Lab Test 05/24/23 0513 05/23/23  0438 05/22/23  0456   INR 2.30* 2.44* 2.96*       25 MINUTES SPENT BY ME on the date of service doing chart review, history, exam, documentation, discussion with nursing staff and specialist, & further activities per the note.  Sanjuanita Granado MD  [facility] Medicine Service  [phone]

## 2023-05-25 NOTE — CARE PLAN
Heart Failure Care Map  GOALS TO BE MET BEFORE DISCHARGE:    1. Decrease congestion and/or edema with diuretic therapy to achieve near optimal volume status.     Dyspnea improved: No, further care required to meet this goal. Please explain still has SOB with activity   Edema improved: Yes, satisfactory for discharge.        Last 24 hour I/O:   Intake/Output Summary (Last 24 hours) at 5/24/2023 2220  Last data filed at 5/24/2023 2200  Gross per 24 hour   Intake 1201 ml   Output 3750 ml   Net -2549 ml           Net I/O and Weights since admission:   04/24 2300 - 05/24 2259  In: 9641 [P.O.:8832; I.V.:809]  Out: 86921 [Urine:37038]  Net: -19217     Vitals:    05/18/23 0839 05/18/23 1815 05/19/23 0412 05/20/23 0348   Weight: 130.6 kg (288 lb) 122 kg (268 lb 15.4 oz) 123.5 kg (272 lb 4.3 oz) 121.9 kg (268 lb 11.9 oz)    05/21/23 0335 05/22/23 0616 05/23/23 0653 05/24/23 0341   Weight: 121.8 kg (268 lb 8.3 oz) 120.6 kg (265 lb 14 oz) 120.3 kg (265 lb 4.8 oz) 117 kg (257 lb 15 oz)       2.  O2 sats > 90% on room air, or at prior home O2 therapy level.      Able to wean O2 this shift to keep sats above 90%?: No, further care required to meet this goal. Please explain desaturates with activity in the 70's to 80's at 10 LPM   Does patient use Home O2? Yes-  10 LPM           Current oxygenation status:   SpO2: 92 %     O2 Device: Nasal cannula, Oxygen Delivery: 10 LPM    3.  Tolerates ambulation and mobility near baseline.     Ambulation: No, further care required to meet this goal. Please explain NA   Times patient ambulated with staff this shift: 0    Please review the Heart Failure Care Map for additional HF goal outcomes.    Opal Valencia RN  5/24/2023    New OOA referred to our GI group. PCP referred to Dr Valero

## 2023-05-25 NOTE — PLAN OF CARE
Problem: Gas Exchange Impaired  Goal: Optimal Gas Exchange  Outcome: Progressing     Problem: Heart Failure  Goal: Stable Heart Rate and Rhythm  Outcome: Progressing   Goal Outcome Evaluation:       Pt is AXOX4, on 5L NC with high flow tubing, paced a-fib without spikes and assistx1 with a walker and a gait belt. K protocol, recheck in the AM. Pt denies pain. 1500 ml fluid restriction. Pt had a BM this shift.  Wife at the bedside.     At the start of the shift pt was on 10L NC, weaned down to 5L NC.

## 2023-05-25 NOTE — PLAN OF CARE
Problem: Gas Exchange Impaired  Goal: Optimal Gas Exchange  Outcome: Progressing  Intervention: Optimize Oxygenation and Ventilation  Recent Flowsheet Documentation  Taken 5/25/2023 0024 by Mariola Fritz RN  Head of Bed (HOB) Positioning: HOB at 30 degrees       Problem: COPD (Chronic Obstructive Pulmonary Disease) Comorbidity  Goal: Maintenance of COPD Symptom Control  Outcome: Progressing  Intervention: Maintain COPD-Symptom Control  Recent Flowsheet Documentation  Taken 5/25/2023 0024 by Mariola Fritz RN  Medication Review/Management: medications reviewed       Goal Outcome Evaluation: Patient is aox4. Denied chest pain, SOB, and lightheadedness. Has paced rhythm, no spikes noted. Has underlying a-fib with BBB. Hr 60s-70s. Patient desats to 80% with activity and requires 15L to recover. Once patient recovers, using 10L oxygen via HFNC at rest. SpO2 between 93-96% with 10L oxygen. Patient has trace edema. Getting 2 mg IV Bumex q8 hrs. Output 800 ml. Intake 200 ml. Patient is assist x1 and uses a walker. Call-light within reach and bed alarm on for safety.

## 2023-05-26 PROBLEM — J96.21 ACUTE ON CHRONIC RESPIRATORY FAILURE WITH HYPOXIA (H): Status: ACTIVE | Noted: 2023-01-01

## 2023-05-26 NOTE — CARE PLAN
Heart Failure Care Map  GOALS TO BE MET BEFORE DISCHARGE:    1. Decrease congestion and/or edema with diuretic therapy to achieve near optimal volume status.     Dyspnea improved: No, further care required to meet this goal. Please explain weaned down to 5L then increased to 6L when lying down   Edema improved: No, further care required to meet this goal. Please explain mild edema        Last 24 hour I/O:   Intake/Output Summary (Last 24 hours) at 5/25/2023 2214  Last data filed at 5/25/2023 2000  Gross per 24 hour   Intake 1000 ml   Output 2300 ml   Net -1300 ml           Net I/O and Weights since admission:   04/25 2300 - 05/25 2259  In: 73661 [P.O.:9832; I.V.:809]  Out: 72535 [Urine:18086]  Net: -36693     Vitals:    05/18/23 0839 05/18/23 1815 05/19/23 0412 05/20/23 0348   Weight: 130.6 kg (288 lb) 122 kg (268 lb 15.4 oz) 123.5 kg (272 lb 4.3 oz) 121.9 kg (268 lb 11.9 oz)    05/21/23 0335 05/22/23 0616 05/23/23 0653 05/24/23 0341   Weight: 121.8 kg (268 lb 8.3 oz) 120.6 kg (265 lb 14 oz) 120.3 kg (265 lb 4.8 oz) 117 kg (257 lb 15 oz)    05/25/23 0358 05/25/23 1111   Weight: 117 kg (257 lb 15 oz) 116.7 kg (257 lb 3.2 oz)       2.  O2 sats > 90% on room air, or at prior home O2 therapy level.      Able to wean O2 this shift to keep sats above 90%?: No, further care required to meet this goal. Please explain increased to 6L once lying down   Does patient use Home O2? Yes-  10 LPM          Current oxygenation status:   SpO2: 90 %     O2 Device: High Flow Nasal Cannula (HFNC), Oxygen Delivery: 6 LPM    3.  Tolerates ambulation and mobility near baseline.     Ambulation: No, further care required to meet this goal. Please explain LIMA   Times patient ambulated with staff this shift: 0    Please review the Heart Failure Care Map for additional HF goal outcomes.    Jaqueline Issa, FENG  5/25/2023

## 2023-05-26 NOTE — PLAN OF CARE
Problem: Heart Failure  Goal: Effective Oxygenation and Ventilation  Outcome: Progressing   Goal Outcome Evaluation:    When I came on pt. Was at 6 liters on high flow cannula.  But  he slept hard at around 4am and he had to be increased, to 9 L on a high flow NC.  To be between 90 % and 89%.

## 2023-05-26 NOTE — PROGRESS NOTES
Care Management Follow Up    Length of Stay (days): 8    Expected Discharge Date: 05/28/2023     Concerns to be Addressed: medical progression; discharge planning  Patient plan of care discussed at interdisciplinary rounds: Yes     Anticipated Discharge Disposition:  Home with home care     Anticipated Discharge Services:   Home with home care  Anticipated Discharge DME: Oxygen     Patient/family educated on Medicare website which has current facility and service quality ratings: no  Education Provided on the Discharge Plan: Yes  Patient/Family in Agreement with the Plan:       Referrals Placed by CM/JUDIE: Homecare  Private pay costs discussed: Not applicable    Additional Information:  9:58 AM  JUDIE received a phone call from Josy [phone] at [facility] who was requesting an update on Pt's anticipated discharge date. JUDIE stated CM will check with the Hospitalist and call her back. Message sent to Hospitalist.    12:52 PM  JUDIE called Josy to provide an update with Pt's current status. CM to follow up with an update once Pt is ready to discharge.     VEE Johnston

## 2023-05-26 NOTE — PROGRESS NOTES
PALLIATIVE CARE SOCIAL WORK Progress Note   Location: [facility]      Brief check in with Pt and spouse today. Pt is hoping to get home today. His O2 sats are a concern to medical staff, but he feels that he is back to is baseline. They are ready for home care at home whenever he is discharged.   No other needs or concerns reported today. Provided support.     Plan: PCSW is available if needs arise.     Clinical Social Work Interventions:   Other: general emotional support      [facility]  [facility], Palliative Care  Securely message with the [employer] Web Console (learn more here) or  Text page via [facility] Paging/Directory

## 2023-05-26 NOTE — PROGRESS NOTES
[facility]    PROGRESS NOTE - Hospitalist Service    Assessment and Plan    Principal Problem:    Acute on chronic congestive heart failure, unspecified heart failure type (H)  Active Problems:    Cardiomyopathy (H)    Essential hypertension    Persistent Atrial Fibrillation    CKD (chronic kidney disease) stage 3, GFR 30-59 ml/min (H)    Acute on chronic respiratory failure with hypoxia (H)    Buck Sinclair is a 78 year old male with h/o hypertension, persistent atrial fibrillation, CAD, COPD, and CHF who presents to this ED by ambulance for evaluation of shortness of breath.       HFrEF with ADHF: deemed nonischemic dilated cardiomyopathy.    -TTE (9/13/2022) LVEF 55 to 60%, moderate TI, global RV function mild-moderately reduced, RSVP approximately 73 mmHg indicative of moderate pulmonary hypertension, estimated RA pressure 15 mmHg or greater.  -TTE (5/18/23) showed LVEF 55-60%, RV mod dilated, mod decreased RVS function, mod LAD, severe RAD, 2-3+ TR, severe PHTN, ~RAP >15mmHg  - continue Bumex IV 2mg Q8hrs and reassess in am, had started PO Bumex today but he is still need high O2  - notified cardiology and she recommends when transitioning to PO continue 3mg BID as recommended  -recheck BNP 11,000 and was 3600 on admission   - trend renal function  -strict I/O, daily weights   - resume ARB      Acute on chronic hypoxic respiratory failure  Severe COPD   Moderate-to-severe pulmonary hypertension  -has been on oxygen chronically and for past several months was using 10L PNC?  -Acute decline due to volume overload in the setting of poor cardiopulmonary status chronically.  -required initially BiPAP on admission followed by HFNC and weaned to 8L on 5/22.  -Chest x-ray in emergency department showed new patchy airspace opacities in right lower lobe suggestive of area of pneumonitis/early proximal pneumonia.  Emphysematous changes. Cardiomegaly and mild pulmonary vascular congestion.     -CT Chest w/o contrast due to CHRISSY (5/19/23) showed Unchanged bibasilar peripheral parenchymal scarring. No acute lung, airway, or pleural space abnormality. Biatrial and right ventricular heart enlargement. Dilated central pulmonary arteries.   -Repeat limited TTE with bubble study performed on 5/21/23 which was negative for interatrial shunt.  - hold Breo-Ellipta and Incruse Ellipta while on scheduled xopenex/atrovent nebs TID  -prednisone taper as ordered  -discussed with Pulm and agrees with diuresing   - bronchial hygiene ordered   - VQ scan low probability for PE  - still high oxygen need unable to discharge at this time   - consider repeat sleep study despite 2015 unremarkable with elevated pulm HTN referral at discharge      Possible CAP:   -Chest x-ray in emergency department showed new patchy airspace opacities in right lower lobe suggestive of area of pneumonitis/early proximal pneumonia.  Emphysematous changes. Cardiomegaly and mild pulmonary vascular congestion.    -CT Chest w/o contrast due to CHRISSY (5/19/23) showed Unchanged bibasilar peripheral parenchymal scarring. No acute lung, airway, or pleural space abnormality. Biatrial and right ventricular heart enlargement. Dilated central pulmonary arteries.   -Blood culture x2 ngtd, procalcitonin 0.01, respiratory panel PCR negative, MRSA nares negative.   -Empiric IV zosyn/Doxy stopped 5/23  -Augmentin/Doxy x 5 days from 5/23 will finish course for completeness      Coronary artery disease: S/P CANDELARIA x3.  Coronary angiogram 1/24/2022 showed mild CAD with patent LAD stents in the proximal and mid LAD, left circumflex third obtuse marginal branch 30% stenosis, mid RCA lesion 20% stenosis.  LVEDP and PCWP normal.  PAP 66/24 mmHg with a mean pressure of 37 mmHg.  Shikha and TD cardiac outputs both 5.7 L/min.  -On warfarin, sotalol, statin  -start ASA 81mg qd  - No anginal complaints  - Troponin T-HS 31 ->30     Medtronic dual-chamber implantable cardiac  "defibrillator: -Indication:  primary prevention of sudden cardiac death.  -placed on 6/11/2014 with generator change on 10/28/2022  -device interrogation on 5/16/23 reviewed.      Persistent atrial fibrillation;  Supratherapeutic anticoagulation- resolved.  -per Dr. Garrett's last note, status post multiple electrical cardioversions, most recently on 11/19/2021. He underwent complex catheter ablation x2 in 2011. Overall fairly low burden of atrial arrhythmia based on his device interrogations. [id number] score is at least 5 (congestive heart failure, hypertension, age 75 or greater, coronary artery disease).  -warfarin, pharmacy to dose  --Sotalol dose reduced to 120mg daily     CHRISSY on CKD stage III: CHRISSY due to IV diuresis with net negative fluid loss of .  Admission Cr 1.64 carlo to peak of 1.96 (5/22) -> 1.88 today after IV diuresis was stopped on 5/22.  Baseline creatinine 1.5-1.6.   -hold Losartan until renal recovery near baseline then resume  - IV bumex restarted  - check renal function actually improved today      HTN:  -Sotalol dose reduced to 120mg q24hrs due to CrCl.  - IV bumex for now   - resume losartan      Oral Candidiasis:  -Nystatin Sw/Sw x 5 days     HLD:  -lipitor     Chronic macrocytic anemia, acute thrombocytopenia:  Platelets 139. Dale 123 (5/22/23) TSH normal.  Hgb stable at ~11. Baseline 10-11.  B12/Folate high. Normal ptt, fibrinogen, and D Dimer makes DIC less likely.      Hypokalemia:  -monitor and replete per protocol  -  Clinically Significant Risk Factors      # Overweight: Estimated body mass index is 28.31 kg/m  as calculated from the following:  Height as of this encounter: 1.626 m (5' 4\").  Weight as of this encounter: 74.8 kg (164 lb 14.4 oz)., PRESENT ON ADMISSION    COVID-19 PCR Results        11/18/2021    08:58 1/20/2022    14:41 2/17/2022    09:04 5/18/2023    16:05   COVID-19 PCR Results   SARS CoV2 PCR Negative   Negative   Negative   Negative     COVID-19 Antibody Results, " Testing for Immunity         No data to display               Code Status: Full Code  VTE prophylaxis:  Warfarin  DIET: Orders Placed This Encounter      Advance Diet as Tolerated: Regular Diet Adult; 2 gm NA Diet    Drains/Lines: none  Weight bearing status: WBAT     Expected Discharge Date: 05/28/2023    Discharge Delays: IV Medication - consider oral or Home Infusion  Lab Result Pending (enter specific test & time in comments)    Discharge Comments: needs further diuresis and possible discharge tomorrow.pending decreased oxygen      Subjective:  Still high O2 need, will continue to try IV diuretics and discussed with patient and wife,     PHYSICAL EXAM  Temp:  [97.4  F (36.3  C)-98  F (36.7  C)] 97.4  F (36.3  C)  Pulse:  [63-95] 63  Resp:  [18-20] 20  BP: ()/(61-87) 94/61  SpO2:  [87 %-95 %] 91 %  Wt Readings from Last 1 Encounters:   05/26/23 116.6 kg (257 lb 0.9 oz)       Intake/Output Summary (Last 24 hours) at 5/24/2023 0831  Last data filed at 5/24/2023 0728  Gross per 24 hour   Intake 1425 ml   Output 3575 ml   Net -2150 ml      Body mass index is 32.13 kg/m .    Physical Exam  Constitutional:       Comments: Chronically ill-appearing, NAD, less winded when speaking    Cardiovascular:      Rate and Rhythm: Normal rate and regular rhythm.      Pulses: Normal pulses.   Pulmonary:      Effort: Pulmonary effort is normal.      Comments: CTA b/lno W/R/R  Abdominal:      General: Bowel sounds are normal.      Palpations: Abdomen is soft.   Musculoskeletal:         General: Normal range of motion.      Comments: Trace pedal edema   Skin:     General: Skin is warm and dry.      Capillary Refill: Capillary refill takes less than 2 seconds.   Neurological:      General: No focal deficit present.      Mental Status: He is alert and oriented to person, place, and time. Mental status is at baseline.         PERTINENT LABS/IMAGING:  Results for orders placed or performed during the hospital encounter of 05/18/23    XR Chest Port 1 View    Impression    IMPRESSION: Multi lead right subclavian venous pacemaker. Leads are intact and unchanged in position.     There are new, patchy airspace opacities in the right lower lobe suggestive of an area of pneumonitis/early bronchopneumonia. Emphysematous changes are better appreciated on the CT.    No change in the cardiomegaly and mild pulmonary vascular congestion. No signs of failure.    CT Chest w/o Contrast    Impression    IMPRESSION:     1.  Unchanged bibasilar peripheral parenchymal scarring. No acute lung, airway, or pleural space abnormality.  2.  Biatrial and right ventricular heart enlargement. Dilated central pulmonary arteries. Findings are consistent with acute pulmonary hypertension. Calcification in the walls of the left atrium likely from prior ablation procedure.     Echocardiogram Complete   Result Value Ref Range    LVEF  55-60%    Echocardiogram Limited with Bubble Study   Result Value Ref Range    LVEF  55-60%        Recent Labs   Lab 05/26/23  0438 05/25/23  0444 05/24/23  0513 05/23/23  0438 05/22/23  0456 05/20/23  1148 05/20/23  0427   WBC  --  10.9  --  9.0 9.0   < > 10.3   HGB  --  12.2*  --  11.8* 11.0*   < > 10.9*   MCV  --  99  --  101* 101*   < > 105*   PLT  --  134*  --  139* 123*   < > 142*   INR 3.24* 2.70* 2.30* 2.44* 2.96*   < > 3.71*    144  --  143 145   < > 144   POTASSIUM 3.5 3.7 3.5 3.7 3.8   < > 3.3*   CHLORIDE 101 101  --  102 105   < > 106   CO2 32* 34*  --  30* 29   < > 25   BUN 51.7* 52.4*  --  53.9* 55.7*   < > 48.2*   CR 1.41* 1.60* 1.64* 1.88* 1.96*   < > 1.82*   ANIONGAP 11 9  --  11 11   < > 13   AMBIKA 8.6* 8.8  --  8.9 8.5*   < > 8.5*   * 117*  --  147* 144*   < > 152*   ALBUMIN  --   --   --   --   --   --  3.3*    < > = values in this interval not displayed.     Recent Labs   Lab Test 10/06/22  1240   CHOL 109   HDL 58   LDL 43   TRIG 39     Recent Labs   Lab Test 05/24/23  0513 05/23/23  0438   NA  --  143   POTASSIUM  3.5 3.7   CHLORIDE  --  102   CO2  --  30*   GLC  --  147*   BUN  --  53.9*   CR 1.64* 1.88*   GFRESTIMATED 43* 36*   AMBIKA  --  8.9     No results for input(s): A1C in the last 78979 hours.  Recent Labs   Lab Test 05/23/23  0438 05/22/23  0456 05/21/23  0431   HGB 11.8* 11.0* 11.2*     No results for input(s): TROPONINI in the last 27667 hours.  Recent Labs   Lab Test 05/18/23  0855 10/05/22  1002 05/17/22  1149 02/15/22  0941 02/09/22  1032 12/01/20  0958   BNP  --   --   --   --   --  265*   NTBNPI 3,644*  --   --   --   --   --    NTBNP  --  3,598* 2,976* 2,870*   < >  --     < > = values in this interval not displayed.     Recent Labs   Lab Test 05/18/23  0855   TSH 2.38     Recent Labs   Lab Test 05/24/23  0513 05/23/23  0438 05/22/23  0456   INR 2.30* 2.44* 2.96*       25 MINUTES SPENT BY ME on the date of service doing chart review, history, exam, documentation, discussion with nursing staff and specialist, & further activities per the note.  Sanjuanita Granado MD  [facility] Medicine Service  [phone]

## 2023-05-26 NOTE — DISCHARGE INSTRUCTIONS
WOC DISCHARGE INSTRUCTIONS:  Left buttock:  Mepilex 4x4  Change every 3 days           Home care services have been arranged for you:  Home Care Agency: [facility]  Home Care Services: physical therapy, occupational therapy, skilled nursing, home health aide  Home Care Phone: [phone]     Instructions: Home care agency will call within 48 hours of discharge to schedule appointments.

## 2023-05-26 NOTE — CONSULTS
"HEART CARE NOTE        Thank you, Dr. Granado, for asking the [facility] Heart Care team to see Buck Sinclair to evaluate ADHF.    Assessment/Recommendations     1. RV dysfunction/HFimpEF  Assessment / Plan  Hypervolemic on physical exam - transition to bumex gtt after bolus and continue to monitor     2. CAD  Assessment / Plan  S/p CANDELARIA x3; denies chest pain or anginal equivalent   Continue ASA unless otherwise contraindicated     3. Atrial fibrillation  Assessment / Plan  S/p ablation x2; s/p ICD  Currently on sotalol - reduced 2/2 prolonged QTc; Coumadin management per pharmacy     4. CHRISSY on CKD   Assessment / Plan  Improving with ongoing diuresis; Continue to monitor renal function closely      5. Moderate-severe Pulmonary HTN  Assessment / Plan  Primarily WHO group 3 - follows with Dr. Payton in pulmonary clinic and Dr. Morales; inpatient pulmonary team evaluated patient during this admission    Clinically Significant Risk Factors              # Hypoalbuminemia: Lowest albumin = 3.3 g/dL at 5/20/2023  4:27 AM, will monitor as appropriate     # Hypertension: Noted on problem list        # Obesity: Estimated body mass index is 32.13 kg/m  as calculated from the following:    Height as of this encounter: 1.905 m (6' 3\").    Weight as of this encounter: 116.6 kg (257 lb 0.9 oz).   # Moderate Malnutrition: based on nutrition assessment      Cardiac Arrhythmia: Atrial fibrillation: Unspecified    Fluid overload, unspecified, Other fluid overload and Other disorders of electrolyte and fluid balance, not elsewhere classified    Acute kidney failure, unspecified  CKD POA List: Stage 3b (GFR 30-44)      History of Present Illness/Subjective      Mr. Buck Sinclair is a 78 year old male with a PMHx significant for (per Dr. Garrett's note)  HFrEF/RV dysfunction due to nonischemic cardiomyopathy (out of proportion to CAD) with LVEF most recently noted to be 50%, persistent AF s/p multiple electrical cardioversions and " "extensive catheter ablations x2 in 2011, CAD s/p CANDELARIA x3 to the [id number] LAD, COPD on home O2, Medtronic dual-chamber permanent pacemaker placement in 1999 replaced with a Medtronic dual-chamber implantable cardiac defibrillator on 6/11/2014, PHTN, HTN, and HLD presenting in acute hypoxic respiratory failure in the setting of ADHF     Today, Mr. Bone denies any acute cardiac events or complaints; continues to appreciate improvement in his dyspnea; Management plan as detailed above     ECG: Personally reviewed. Paced rhythm     ECHO (personnaly Reviewed): repeat study pending   Left ventricular size, wall motion and function are normal. The ejection  fraction is 55-60%.  Global right ventricular function is mildly to moderately reduced.  Moderate tricuspid insufficiency is present.  RVSP is estimated 73mmHg.Moderate pulmonary hypertension is present.  IVC diameter >2.1 cm collapsing <50% with sniff suggests a high RA pressure  estimated at 15 mmHg or greater.     When compared with prior study of 12/08/2021, filling pressures/PASP are  higher now.          Physical Examination Review of Systems   BP 94/61 (BP Location: Right arm)   Pulse 63   Temp 97.4  F (36.3  C) (Oral)   Resp 20   Ht 1.905 m (6' 3\")   Wt 116.6 kg (257 lb 0.9 oz)   SpO2 91%   BMI 32.13 kg/m    Body mass index is 32.13 kg/m .  Wt Readings from Last 3 Encounters:   05/26/23 116.6 kg (257 lb 0.9 oz)   11/16/22 123.3 kg (271 lb 14.4 oz)   11/08/22 118.8 kg (262 lb)     General Appearance:   no distress, normal body habitus   ENT/Mouth: membranes moist, no oral lesions or bleeding gums.      EYES:  no scleral icterus, normal conjunctivae   Neck: no carotid bruits or thyromegaly   Chest/Lungs:   lungs are clear to auscultation, no rales or wheezing, equal chest wall expansion    Cardiovascular:   Regular. Normal first and second heart sounds with no murmurs, rubs, or gallops; the carotid, radial and posterior tibial pulses are " intact, + JVD, no LE edema bilaterally    Abdomen:  no organomegaly, masses, bruits, or tenderness; bowel sounds are present   Extremities: no cyanosis or clubbing   Skin: no xanthelasma, warm.    Neurologic: alert and oriented x3, calm     Psychiatric: alert and oriented x3, calm     A complete 10 systems ROS was reviewed  And is negative except what is listed in the HPI.          Medical History  Surgical History Family History Social History   Past Medical History:   Diagnosis Date     Anemia      Asthma without status asthmaticus 5/5/2021     BPH (benign prostatic hyperplasia)      Cardiomyopathy (H)      COPD, group B, by GOLD 2017 classification (H)      Coronary artery disease due to calcified coronary lesion      Dyslipidemia, goal LDL below 70      Essential hypertension      History of transfusion      Persistent atrial fibrillation (H)      Pneumonia of left lower lobe due to infectious organism 10/4/2017     Skin cancer of trunk      Status post catheter ablation of atrial fibrillation 6/7/2017    PVI 4-2011 (Cryo/PVI + roof line + CTI line) Re-do PVI 7-2011 (RFA/PVI + CFE + VIDYA + confirmed CTI line)     Ventricular tachycardia (H)     Past Surgical History:   Procedure Laterality Date     CARDIAC DEFIBRILLATOR PLACEMENT       CARDIOVERSION  07/11/2018    x20, last 2/12/15, 10/2015, 11/18/16, 6/16/17 by Lauren Foster CNP     CARDIOVERSION  07/11/2018     CARDIOVERSION  11/19/2021     COLONOSCOPY N/A 4/28/2017    Procedure: COLONOSCOPY with 2 ascending polyps and 1 transverse polyp;  Surgeon: Jose Whittington MD;  Location: [facility] GI;  Service:      CV CORONARY ANGIOGRAM N/A 9/20/2017    Procedure: Coronary Angiogram;  Surgeon: Sergio Cervantes MD;  Location: [facility] Cath Lab;  Service:      CV CORONARY ANGIOGRAM N/A 1/24/2022    Procedure: Coronary Angiogram;  Surgeon: Christi Saunders MD;  Location: [facility] CATH LAB CV     CV LEFT HEART CATH N/A 1/24/2022    Procedure: Left Heart Cath;   Surgeon: Christi Saunders MD;  Location: [facility] CATH [facility] CV     CV RIGHT AND LEFT HEART CATH N/A 2022    Procedure: Right and Left Heart Catherization;  Surgeon: Christi Saunders MD;  Location: [facility] CATH LAB CV     EP ICD GENERATOR REPLACEMENT DUAL N/A 10/28/2022    Procedure: Implantable Cardioverter Defibrillator Generator Replacement Dual;  Surgeon: Elo Collins MD;  Location: [facility] CV     EP ICD INSERT       FRACTURE SURGERY Left     wrist     INGUINAL HERNIA REPAIR Left     while in the [employer] in [employer] after 13 month in [place]     INSERT / REPLACE / REMOVE PACEMAKER       IR MISCELLANEOUS PROCEDURE  2014     OTHER SURGICAL HISTORY      left hand surgery---tendon repair     [place] ABLATE HEART DYSRHYTHM FOCUS  2011    Catheter Ablation Atrial Fibrillation PVI 2011 (Cryo+RF-PVI + roof line + CTI line)     [place] ABLATE HEART DYSRHYTHM FOCUS  2011    Re-do PVI 2011 (RFA-PVI + CFE + VIDYA + confirmation of CTI line)     TOTAL SHOULDER REPLACEMENT Right 2016    Dr. Abernathy of [facility]     WRIST SURGERY Left      ZZC MYERS W/O FACETEC FORAMOT/DSKC  VRT SEG, CERVICAL      Laminectomy Lumbar;  Recorded: 2012;    no family history of premature coronary artery disease Social History     Socioeconomic History     Marital status:      Spouse name: Not on file     Number of children: Not on file     Years of education: Not on file     Highest education level: Not on file   Occupational History     Not on file   Tobacco Use     Smoking status: Former     Packs/day: 1.00     Years: 4.00     Pack years: 4.00     Types: Cigarettes     Quit date: 1968     Years since quittin.4     Smokeless tobacco: Never   Vaping Use     Vaping status: Never Used   Substance and Sexual Activity     Alcohol use: Yes     Alcohol/week: 2.0 standard drinks of alcohol     Comment: Alcoholic Drinks/day: 1 beer per week     Drug use: No     Sexual activity: Yes      Partners: Female     Birth control/protection: Post-menopausal   Other Topics Concern     Parent/sibling w/ CABG, MI or angioplasty before 65F 55M? Not Asked   Social History Narrative    Preloaded 01/14/2013     Social Determinants of Health     Financial Resource Strain: Not on file   Food Insecurity: Not on file   Transportation Needs: Not on file   Physical Activity: Not on file   Stress: Not on file   Social Connections: Not on file   Intimate Partner Violence: Not on file   Housing Stability: Not on file           Lab Results    Chemistry/lipid CBC Cardiac Enzymes/BNP/TSH/INR   Lab Results   Component Value Date    CHOL 109 10/06/2022    HDL 58 10/06/2022    TRIG 39 10/06/2022    BUN 51.7 (H) 05/26/2023     05/26/2023    CO2 32 (H) 05/26/2023    Lab Results   Component Value Date    WBC 10.9 05/25/2023    HGB 12.2 (L) 05/25/2023    HCT 39.0 (L) 05/25/2023    MCV 99 05/25/2023     (L) 05/25/2023    Lab Results   Component Value Date     (H) 12/01/2020    TSH 2.38 05/18/2023    INR 3.24 (H) 05/26/2023     No results found for: CKTOTAL, CKMB, TROPONINI       Weight:    Wt Readings from Last 3 Encounters:   05/26/23 116.6 kg (257 lb 0.9 oz)   11/16/22 123.3 kg (271 lb 14.4 oz)   11/08/22 118.8 kg (262 lb)       Allergies  Allergies   Allergen Reactions     Adhesive Tape Other (See Comments)     ADHESIVE TAPE; SKIN IRRITATION; Skin pulled off with foam tape       Amiodarone      ADVERSE REACTION.  Sunlight sensitivity.     Lisinopril          Surgical History  Past Surgical History:   Procedure Laterality Date     CARDIAC DEFIBRILLATOR PLACEMENT       CARDIOVERSION  07/11/2018    x20, last 2/12/15, 10/2015, 11/18/16, 6/16/17 by Lauren Foster CNP     CARDIOVERSION  07/11/2018     CARDIOVERSION  11/19/2021     COLONOSCOPY N/A 4/28/2017    Procedure: COLONOSCOPY with 2 ascending polyps and 1 transverse polyp;  Surgeon: Jose Whittington MD;  Location: [facility];  Service:      CV CORONARY  ANGIOGRAM N/A 9/20/2017    Procedure: Coronary Angiogram;  Surgeon: Sergio Cervantes MD;  Location: [facility] Cath Lab;  Service:      CV CORONARY ANGIOGRAM N/A 1/24/2022    Procedure: Coronary Angiogram;  Surgeon: Christi Saunders MD;  Location: [facility] CATH LAB CV     CV LEFT HEART CATH N/A 1/24/2022    Procedure: Left Heart Cath;  Surgeon: Christi Saunders MD;  Location: [facility] CATH LAB CV     CV RIGHT AND LEFT HEART CATH N/A 1/24/2022    Procedure: Right and Left Heart Catherization;  Surgeon: Christi Saunders MD;  Location: [facility] CATH LAB CV     EP ICD GENERATOR REPLACEMENT DUAL N/A 10/28/2022    Procedure: Implantable Cardioverter Defibrillator Generator Replacement Dual;  Surgeon: Elo Collins MD;  Location: [facility] CATH LAB CV     EP ICD INSERT       FRACTURE SURGERY Left     wrist     INGUINAL HERNIA REPAIR Left 1967    while in the [employer] in [employer] after 13 month in [place]     INSERT / REPLACE / REMOVE PACEMAKER       IR MISCELLANEOUS PROCEDURE  4/30/2014     OTHER SURGICAL HISTORY      left hand surgery---tendon repair     [place] ABLATE HEART DYSRHYTHM FOCUS  04/2011    Catheter Ablation Atrial Fibrillation PVI Apr 2011 (Cryo+RF-PVI + roof line + CTI line)     [place] ABLATE HEART DYSRHYTHM FOCUS  07/2011    Re-do PVI Jul 2011 (RFA-PVI + CFE + VIDYA + confirmation of CTI line)     TOTAL SHOULDER REPLACEMENT Right 03/03/2016    Dr. Abernathy of [facility]     WRIST SURGERY Left      ZZC MYERS W/O FACETEC FORAMOT/DSKC 1/2 VRT SEG, CERVICAL      Laminectomy Lumbar;  Recorded: 03/09/2012;       Social History  Tobacco:   History   Smoking Status     Former     Packs/day: 1.00     Years: 4.00     Types: Cigarettes     Quit date: 1/1/1968   Smokeless Tobacco     Never    Alcohol:   Social History    Substance and Sexual Activity      Alcohol use: Yes        Alcohol/week: 2.0 standard drinks of alcohol        Comment: Alcoholic Drinks/day: 1 beer per week   Illicit Drugs:   History   Drug Use No        Family History  Family History   Problem Relation Age of Onset     Cancer Mother         leukemia     Cancer Father         bladder     Cancer Sister         breast with lung met.     Aneurysm Sister      CABG Brother      CABG Brother      Valvular heart disease Brother         valve replacement          Deon Iglesias MD on 5/26/2023      cc: Vivek Guerrero,

## 2023-05-26 NOTE — PROGRESS NOTES
..Patient has been assessed for Home Oxygen needs.     Pulse oximetry (SpO2) and Oxygen flow readings:    SpO2 = 77% on room air at rest while awake.    SpO2 improved to 89% on 10 liters/minute at rest.    SpO2 = 77% on room air during activity/with exercise.    *SpO2 improved to 88% on 15 liters/minute during activity/with exercise.

## 2023-05-26 NOTE — CONSULTS
[facility] Nurse Inpatient Assessment     Consulted for: buttocks    Patient History (according to provider note(s):      Per H+P:  78 year old male who has a past medical history significant for HFrEF, status post dual-chamber ICD, CAD, hypertension, persistent atrial fibrillation, chronic hypoxic respiratory failure most recently 10 L PNC, severe COPD who presented to the emergency department due to acute on chronic shortness of breath.  Patient has had progressively worsening dyspnea on exertion along with this paroxysmal nocturnal dyspnea and edema/fluid retention.  No chest pain.  No cough.  No abdominal pain.  No melena, hematochezia or hematemesis.  Patient was briefly seen in the pulmonology clinic by Dr. Payton this morning and upon patient's arrival he looked cyanotic, tachypneic and his SPO2 was in the 70s on 10 L/min.  Ambulance contacted right away and transported patient to the emergency department.  In the emergency department, patient was noted to be on respiratory distress and was therefore placed on BiPAP which has now been weaned down to 6 L nasal cannula.  Admitted for further evaluation and management    Assessment:      Areas visualized during today's visit: Buttocks    Skin Injury Location: Buttocks    Last photo: 5/26  Skin injury due to: Unclear etiology  Skin history and plan of care:   Per patient and wife, he has had these for at least six months on the left buttock, and the area on the right is new.  Patient states they are not itchy or painful, but does endorse the a Mepilex on the left is more comfortable than sitting on them. Right buttock is inside cleft.  Previously his primary MD had ordered an over the counter cream but reports it didn't help.  Affected area:      Skin assessment: Circular purple flat macules on fleshy areas     Temperature  normal      Drainage: none .      Color: none      Odor: none  Pain: denies   Treatment goal: Maintain  (prevention of deterioration)  STATUS: initial assessment  Supplies ordered: supplies stored on unit         Treatment Plan:     Left buttock:  Mepilex 4x4  Chmage every 3 days     Orders: Written    RECOMMEND PRIMARY TEAM ORDER: Dermatology consult  Education provided: plan of care  Discussed plan of care with: Patient, Family and Nurse  [facility] nurse follow-up plan: weekly  Notify WOC if wound(s) deteriorate.  Nursing to notify the Provider(s) and re-consult the WO Nurse if new skin concern.    DATA:     Current support surface: Standard  Standard gel/foam mattress (IsoFlex, Atmos air, etc)  Containment of urine/stool: Continent of bladder and Continent of bowel  BMI: Body mass index is 32.13 kg/m .   Active diet order: Orders Placed This Encounter      Advance Diet as Tolerated: Regular Diet Adult; 2 gm NA Diet     Output: I/O last 3 completed shifts:  In: 1090 [P.O.:1090]  Out: 2950 [Urine:2950]     Labs: Recent Labs   Lab 05/26/23  0438 05/25/23  0444 05/21/23  0431 05/20/23  0427   ALBUMIN  --   --   --  3.3*   HGB  --  12.2*   < > 10.9*   INR 3.24* 2.70*   < > 3.71*   WBC  --  10.9   < > 10.3    < > = values in this interval not displayed.     Pressure injury risk assessment:   Sensory Perception: 4-->no impairment  Moisture: 4-->rarely moist  Activity: 4-->walks frequently  Mobility: 4-->no limitation  Nutrition: 4-->excellent  Friction and Shear: 3-->no apparent problem  John Score: 23    Millie Mccarty, WOODN, RN, PHN, HNB-BC, CWOCN  Pager no longer is use, please contact through [employer] group: [facility]

## 2023-05-26 NOTE — PROGRESS NOTES
Pt. On the primo fit., appears having good output.  He has filled It once and will get Bumex right before 7 AM again.     I did have to increase 02 to 9 lpm about 4 AM

## 2023-05-26 NOTE — PROGRESS NOTES
..  Heart Failure Care Map  GOALS TO BE MET BEFORE DISCHARGE:    1. Decrease congestion and/or edema with diuretic therapy to achieve near optimal volume status.     Dyspnea improved: No, further care required to meet this goal. Please explain still on 8 L of O   Edema improved: No, further care required to meet this goal. Please explain Started on 1 Mg  of IV Bumex gtt         Last 24 hour I/O:   Intake/Output Summary (Last 24 hours) at 5/26/2023 1651  Last data filed at 5/26/2023 1600  Gross per 24 hour   Intake 1430 ml   Output 3500 ml   Net -2070 ml           Net I/O and Weights since admission:   04/26 2300 - 05/26 2259  In: 31022 [P.O.:10825; I.V.:809]  Out: 03015 [Urine:12885]  Net: -34323     Vitals:    05/18/23 0839 05/18/23 1815 05/19/23 0412 05/20/23 0348   Weight: 130.6 kg (288 lb) 122 kg (268 lb 15.4 oz) 123.5 kg (272 lb 4.3 oz) 121.9 kg (268 lb 11.9 oz)    05/21/23 0335 05/22/23 0616 05/23/23 0653 05/24/23 0341   Weight: 121.8 kg (268 lb 8.3 oz) 120.6 kg (265 lb 14 oz) 120.3 kg (265 lb 4.8 oz) 117 kg (257 lb 15 oz)    05/25/23 0358 05/25/23 1111 05/26/23 0345   Weight: 117 kg (257 lb 15 oz) 116.7 kg (257 lb 3.2 oz) 116.6 kg (257 lb 0.9 oz)       2.  O2 sats > 90% on room air, or at prior home O2 therapy level.      Able to wean O2 this shift to keep sats above 90%?: No, further care required to meet this goal. Please explain still needing 8 L of O2   Does patient use Home O2? Yes-             Current oxygenation status:   SpO2: 94 %     O2 Device: High Flow Nasal Cannula (HFNC), Oxygen Delivery: 8 LPM    3.  Tolerates ambulation and mobility near baseline.     Ambulation: No, further care required to meet this goal. Please explain Low tolerance to activity   Times patient ambulated with staff this shift: 4    Please review the Heart Failure Care Map for additional HF goal outcomes.  At 1500 turned the O2 down to 8 L the O2 saturation remained stable at 94% and at 1822 the O2 turned down to 6 L and will  continue to monitor.  Shraddha Michael RN  5/26/2023

## 2023-05-26 NOTE — PROGRESS NOTES
PALLIATIVE CARE PROGRESS NOTE  [facility]     Patient Name: Buck Sinclair  Date of Admission: 5/18/2023   Today the patient was seen for: [facility] follow-up      Recommendations & Counseling     Patient: Buck Sinclair  Date of Admission:  5/18/2023     Requesting Clinician / Team: Hospital medicine  Reason for consult: Goals of care      Recommendations & Counseling      GOALS OF CARE:     Life-prolonging currently without limits     Return home with home services     Would not want to be in long-term care    Salvador and family to discuss further his wishes and code status     Recommend OP palliative clinic follow-up for continued support and exploration of cares. (Referral placed today)    Tentatively planning for discharge in next 1-2 days      ADVANCE CARE PLANNING:    No health care directive on file. Per  informed consent policy next of kin should be involved if patient becomes unable.    There is no POLST form on file, plan to complete prior to DC.    Code status: Full Code     MEDICAL MANAGEMENT:   #Dyspnea,Pulmonary Hypertension, HFrEF     Medical and diuretic management per primary team      Oxygen, baseline 6-10 lpm. Has been on 10L for over 2 weeks     Repositioning    Fan at bedside     #General Weakness/Debility     Physical Therapy    Occupational therapy  Therapy Recommendations:anticipate discharge to home with home care services     PSYCHOSOCIAL/SPIRITUAL:    Family     Friends    Shanice community: Islam      Palliative Care will continue to follow. Thank you for the consult and allowing us to aid in the care of Buck Sinclair.         David Kulkarni NP  Securely message with [employer] (more info)  Text page via [facility] Paging/Directory           Assessments             Buck Sinclair is a 78 year old male who has a past medical history significant for HFrEF, status post dual-chamber ICD, CAD, hypertension, persistent atrial fibrillation, chronic hypoxic  respiratory failure most recently 10 L PNC, severe COPD who presented to the emergency department due to acute on chronic shortness of breath.  Patient has had progressively worsening dyspnea on exertion along with this paroxysmal nocturnal dyspnea and edema/fluid retention.  No chest pain.  No cough.  No abdominal pain.  No melena, hematochezia or hematemesis.  Patient was briefly seen in the pulmonology clinic by Dr. Payton this morning and upon patient's arrival he looked cyanotic, tachypneic and his SPO2 was in the 70s on 10 L/min.  Ambulance contacted right away and transported patient to the emergency department.  In the emergency department, patient was noted to be on respiratory distress and was therefore placed on BiPAP which has now been weaned down to 6 L nasal cannula.  Admitted for further evaluation and management  Prognosis, Goals, or Advance Care Planning was addressed today with: Yes.  Mood, coping, and/or meaning in the context of serious illness were addressed today: Yes.              Interval History:     Chart review/discussion with unit or clinical team members:   Course reviewed. No significant events overnight    Per patient or family/caregivers today:  No acute changes. Dyspnea stable. Feels close to baseline. Feeling comfortable.  Wanting to discharge home. Reflected that his oxygenation status is typically as it has been here with activity. He will have significant drops in his SPO2 with activity but he does not feel the effects of the the hypoxia.             Review of Systems:     Besides above, an additional  system ROS was reviewed and is unremarkable               Physical Exam:   Temp: 97.4  F (36.3  C) Temp src: Oral Temp  Min: 97.4  F (36.3  C)  Max: 98  F (36.7  C) BP: 94/61 Pulse: 63   Resp: 20 SpO2: 91 % O2 Device: High Flow Nasal Cannula (HFNC) Oxygen Delivery: 10 LPM  Vital Signs with Ranges  Temp:  [97.4  F (36.3  C)-98  F (36.7  C)] 97.4  F (36.3  C)  Pulse:  [63-95]  63  Resp:  [18-20] 20  BP: ()/(61-87) 94/61  SpO2:  [87 %-95 %] 91 %  257 lbs .9 oz    Physical Exam  Vitals and nursing note reviewed.   Constitutional:       General: He is not in acute distress.  HENT:      Head: Normocephalic.      Mouth/Throat:      Mouth: Mucous membranes are moist.      Pharynx: Oropharynx is clear.   Eyes:      Extraocular Movements: Extraocular movements intact.      Conjunctiva/sclera: Conjunctivae normal.      Pupils: Pupils are equal, round, and reactive to light.   Cardiovascular:      Rate and Rhythm: Normal rate. Rhythm irregular.      Pulses: Normal pulses.   Pulmonary:      Effort: Pulmonary effort is normal. No respiratory distress.      Breath sounds: No wheezing.   Abdominal:      General: Abdomen is flat. There is no distension.      Tenderness: There is no abdominal tenderness.   Musculoskeletal:         General: Normal range of motion.   Skin:     General: Skin is warm and dry.      Capillary Refill: Capillary refill takes less than 2 seconds.   Neurological:      General: No focal deficit present.      Mental Status: He is alert. Mental status is at baseline.   Psychiatric:         Mood and Affect: Mood normal.         Thought Content: Thought content normal.                  Current Problem List:   Principal Problem:    Acute on chronic congestive heart failure, unspecified heart failure type (H)  Active Problems:    Cardiomyopathy (H)    Essential hypertension    Persistent Atrial Fibrillation    CKD (chronic kidney disease) stage 3, GFR 30-59 ml/min (H)    Acute on chronic respiratory failure with hypoxia (H)      Allergies   Allergen Reactions     Adhesive Tape Other (See Comments)     ADHESIVE TAPE; SKIN IRRITATION; Skin pulled off with foam tape       Amiodarone      ADVERSE REACTION.  Sunlight sensitivity.     Lisinopril             Data Reviewed:       Data   Results for orders placed or performed during the hospital encounter of 05/18/23 (from the past 24  hour(s))   INR   Result Value Ref Range    INR 3.24 (H) 0.85 - 1.15   Basic metabolic panel   Result Value Ref Range    Sodium 144 136 - 145 mmol/L    Potassium 3.5 3.4 - 5.3 mmol/L    Chloride 101 98 - 107 mmol/L    Carbon Dioxide (CO2) 32 (H) 22 - 29 mmol/L    Anion Gap 11 7 - 15 mmol/L    Urea Nitrogen 51.7 (H) 8.0 - 23.0 mg/dL    Creatinine 1.41 (H) 0.67 - 1.17 mg/dL    Calcium 8.6 (L) 8.8 - 10.2 mg/dL    Glucose 122 (H) 70 - 99 mg/dL    GFR Estimate 51 (L) >60 mL/min/1.73m2   Magnesium   Result Value Ref Range    Magnesium 2.2 1.7 - 2.3 mg/dL     *Note: Due to a large number of results and/or encounters for the requested time period, some results have not been displayed. A complete set of results can be found in Results Review.   *      -----------------------------------------------------------------------------------------------------------------          ====================================================  TT: I have personally spent a total of 45 minutes on the date of the encounter,  on the unit in review of medical record, consultation with the medical providers and assessment of Buck Sinclair  today, with more than 50% of this time spent in counseling, coordination of care, and discussion re: diagnostic results, prognosis, symptom management, risks and benefits of management options, and development of plan of care as noted above.      ====================================================

## 2023-05-26 NOTE — TREATMENT PLAN
[facility] Treatment Plan    Patient Score: 8  Patient Acuity: 4    Clinical Indication for Therapy: COPD    Therapy Ordered: Xop/ Atrovent QID    Assessment Summary: History COPD, continue the current therapy and re eval in 72 hours.    Lali Gong, RT  5/26/2023

## 2023-05-27 NOTE — PROGRESS NOTES
[facility]    PROGRESS NOTE - Hospitalist Service    Assessment and Plan    Principal Problem:    Acute on chronic congestive heart failure, unspecified heart failure type (H)  Active Problems:    Cardiomyopathy (H)    Essential hypertension    Persistent Atrial Fibrillation    CKD (chronic kidney disease) stage 3, GFR 30-59 ml/min (H)    Acute on chronic respiratory failure with hypoxia (H)    Buck Sinclair is a 78 year old male with h/o hypertension, persistent atrial fibrillation, CAD, COPD, and CHF who presents to this ED by ambulance for evaluation of shortness of breath.       HFrEF with ADHF: deemed nonischemic dilated cardiomyopathy.    -TTE (9/13/2022) LVEF 55 to 60%, moderate TI, global RV function mild-moderately reduced, RSVP approximately 73 mmHg indicative of moderate pulmonary hypertension, estimated RA pressure 15 mmHg or greater.  -TTE (5/18/23) showed LVEF 55-60%, RV mod dilated, mod decreased RVS function, mod LAD, severe RAD, 2-3+ TR, severe PHTN, ~RAP >15mmHg  - retagged cards and no on bumex infusion with good diuresis   - losartan on hold   - trend renal function  - potassium replacement  - continue telemetry     Acute on chronic hypoxic respiratory failure  Severe COPD   Moderate-to-severe pulmonary hypertension  -has been on oxygen chronically and for past several months was using 10L PNC?  -Acute decline due to volume overload in the setting of poor cardiopulmonary status chronically.  -required initially BiPAP on admission followed by HFNC and weaned to 8L on 5/22.  -Chest x-ray in emergency department showed new patchy airspace opacities in right lower lobe suggestive of area of pneumonitis/early proximal pneumonia.  Emphysematous changes. Cardiomegaly and mild pulmonary vascular congestion.    -CT Chest w/o contrast due to CHRISSY (5/19/23) showed Unchanged bibasilar peripheral parenchymal scarring. No acute lung, airway, or pleural space abnormality. Biatrial  and right ventricular heart enlargement. Dilated central pulmonary arteries.   -Repeat limited TTE with bubble study performed on 5/21/23 which was negative for interatrial shunt.  - hold Breo-Ellipta and Incruse Ellipta while on scheduled xopenex/atrovent nebs TID  -prednisone taper as ordered  -discussed with Pulm and agrees with diuresing   - bronchial hygiene ordered   - VQ scan low probability for PE  - still high oxygen need unable to discharge at this time   - consider repeat sleep study despite 2015 unremarkable with elevated pulm HTN referral at discharge      Possible CAP:   -Chest x-ray in emergency department showed new patchy airspace opacities in right lower lobe suggestive of area of pneumonitis/early proximal pneumonia.  Emphysematous changes. Cardiomegaly and mild pulmonary vascular congestion.    -CT Chest w/o contrast due to CHRISSY (5/19/23) showed Unchanged bibasilar peripheral parenchymal scarring. No acute lung, airway, or pleural space abnormality. Biatrial and right ventricular heart enlargement. Dilated central pulmonary arteries.   -Blood culture x2 ngtd, procalcitonin 0.01, respiratory panel PCR negative, MRSA nares negative.   -Empiric IV zosyn/Doxy stopped 5/23  -Augmentin/Doxy x 5 days from 5/23 will finish course for completeness      Coronary artery disease: S/P CANDELARIA x3.  Coronary angiogram 1/24/2022 showed mild CAD with patent LAD stents in the proximal and mid LAD, left circumflex third obtuse marginal branch 30% stenosis, mid RCA lesion 20% stenosis.  LVEDP and PCWP normal.  PAP 66/24 mmHg with a mean pressure of 37 mmHg.  Shikha and TD cardiac outputs both 5.7 L/min.  -On warfarin, sotalol, statin  -start ASA 81mg qd  - No anginal complaints  - Troponin T-HS 31 ->30     Medtronic dual-chamber implantable cardiac defibrillator: -Indication:  primary prevention of sudden cardiac death.  -placed on 6/11/2014 with generator change on 10/28/2022  -device interrogation on 5/16/23 reviewed.   "    Persistent atrial fibrillation;  Supratherapeutic anticoagulation- resolved.  -per Dr. Garrett's last note, status post multiple electrical cardioversions, most recently on 11/19/2021. He underwent complex catheter ablation x2 in 2011. Overall fairly low burden of atrial arrhythmia based on his device interrogations. [id number] score is at least 5 (congestive heart failure, hypertension, age 75 or greater, coronary artery disease).  -warfarin, pharmacy to dose  --Sotalol dose reduced to 120mg daily     CHRISSY on CKD stage III: CHRISSY due to IV diuresis with net negative fluid loss of .  Admission Cr 1.64 carlo to peak of 1.96 (5/22) -> 1.88 today after IV diuresis was stopped on 5/22.  Baseline creatinine 1.5-1.6.   -hold Losartan until renal recovery near baseline then resume  - IV bumex restarted  - check renal function actually improved today      HTN:  -Sotalol dose reduced to 120mg q24hrs due to CrCl.  - bumex infusion   - holding losartan      Oral Candidiasis:  -Nystatin Sw/Sw x 5 days     HLD:  -lipitor     Chronic macrocytic anemia, acute thrombocytopenia:  Platelets 139. Dale 123 (5/22/23) TSH normal.  Hgb stable at ~11. Baseline 10-11.  B12/Folate high. Normal ptt, fibrinogen, and D Dimer makes DIC less likely.      Hypokalemia:  -monitor and replete per protocol    Clinically Significant Risk Factors      # Overweight: Estimated body mass index is 28.31 kg/m  as calculated from the following:  Height as of this encounter: 1.626 m (5' 4\").  Weight as of this encounter: 74.8 kg (164 lb 14.4 oz)., PRESENT ON ADMISSION    COVID-19 PCR Results        11/18/2021    08:58 1/20/2022    14:41 2/17/2022    09:04 5/18/2023    16:05   COVID-19 PCR Results   SARS CoV2 PCR Negative   Negative   Negative   Negative     COVID-19 Antibody Results, Testing for Immunity         No data to display               Code Status: Full Code  VTE prophylaxis:  Warfarin  DIET: Orders Placed This Encounter      Advance Diet as " Tolerated: Regular Diet Adult; 2 gm NA Diet    Drains/Lines: none  Weight bearing status: WBAT     Expected Discharge Date: 05/28/2023    Discharge Delays: IV Medication - consider oral or Home Infusion  Lab Result Pending (enter specific test & time in comments)    Discharge Comments: High flow 8L, Bumex gtt.      Subjective:  Oxygen need decreased goal is 88-92% per pulm     PHYSICAL EXAM  Temp:  [97.4  F (36.3  C)-97.8  F (36.6  C)] 97.6  F (36.4  C)  Pulse:  [63-80] 79  Resp:  [20] 20  BP: ()/(61-80) 114/76  SpO2:  [90 %-95 %] 91 %  Wt Readings from Last 1 Encounters:   05/27/23 112.8 kg (248 lb 10.9 oz)       Intake/Output Summary (Last 24 hours) at 5/24/2023 0831  Last data filed at 5/24/2023 0728  Gross per 24 hour   Intake 1425 ml   Output 3575 ml   Net -2150 ml      Body mass index is 31.08 kg/m .    Physical Exam  Constitutional:       Comments: Chronically ill-appearing, NAD, less winded when speaking    Cardiovascular:      Rate and Rhythm: Normal rate and regular rhythm.      Pulses: Normal pulses.   Pulmonary:      Effort: Pulmonary effort is normal.      Comments: CTA b/lno W/R/R  Abdominal:      General: Bowel sounds are normal.      Palpations: Abdomen is soft.   Musculoskeletal:         General: Normal range of motion.      Comments: Trace pedal edema   Skin:     General: Skin is warm and dry.      Capillary Refill: Capillary refill takes less than 2 seconds.   Neurological:      General: No focal deficit present.      Mental Status: He is alert and oriented to person, place, and time. Mental status is at baseline.         PERTINENT LABS/IMAGING:  Results for orders placed or performed during the hospital encounter of 05/18/23   XR Chest Port 1 View    Impression    IMPRESSION: Multi lead right subclavian venous pacemaker. Leads are intact and unchanged in position.     There are new, patchy airspace opacities in the right lower lobe suggestive of an area of pneumonitis/early  bronchopneumonia. Emphysematous changes are better appreciated on the CT.    No change in the cardiomegaly and mild pulmonary vascular congestion. No signs of failure.    CT Chest w/o Contrast    Impression    IMPRESSION:     1.  Unchanged bibasilar peripheral parenchymal scarring. No acute lung, airway, or pleural space abnormality.  2.  Biatrial and right ventricular heart enlargement. Dilated central pulmonary arteries. Findings are consistent with acute pulmonary hypertension. Calcification in the walls of the left atrium likely from prior ablation procedure.     Echocardiogram Complete   Result Value Ref Range    LVEF  55-60%    Echocardiogram Limited with Bubble Study   Result Value Ref Range    LVEF  55-60%        Recent Labs   Lab 05/27/23  0452 05/26/23 0438 05/25/23  0444 05/24/23  0513 05/23/23 0438 05/22/23 0456   WBC  --   --  10.9  --  9.0 9.0   HGB  --   --  12.2*  --  11.8* 11.0*   MCV  --   --  99  --  101* 101*   PLT  --   --  134*  --  139* 123*   INR 3.32* 3.24* 2.70*   < > 2.44* 2.96*    144 144  --  143 145   POTASSIUM 3.4 3.5 3.7   < > 3.7 3.8   CHLORIDE 99 101 101  --  102 105   CO2 34* 32* 34*  --  30* 29   BUN 49.6* 51.7* 52.4*  --  53.9* 55.7*   CR 1.54* 1.41* 1.60*   < > 1.88* 1.96*   ANIONGAP 12 11 9  --  11 11   AMBIKA 8.9 8.6* 8.8  --  8.9 8.5*   * 122* 117*  --  147* 144*    < > = values in this interval not displayed.     Recent Labs   Lab Test 10/06/22  1240   CHOL 109   HDL 58   LDL 43   TRIG 39     Recent Labs   Lab Test 05/24/23 0513 05/23/23 0438   NA  --  143   POTASSIUM 3.5 3.7   CHLORIDE  --  102   CO2  --  30*   GLC  --  147*   BUN  --  53.9*   CR 1.64* 1.88*   GFRESTIMATED 43* 36*   AMBIKA  --  8.9     No results for input(s): A1C in the last 19807 hours.  Recent Labs   Lab Test 05/23/23  0438 05/22/23  0456 05/21/23  0431   HGB 11.8* 11.0* 11.2*     No results for input(s): TROPONINI in the last 20606 hours.  Recent Labs   Lab Test 05/18/23  0855 10/05/22  1002  05/17/22  1149 02/15/22  0941 02/09/22  1032 12/01/20  0958   BNP  --   --   --   --   --  265*   NTBNPI 3,644*  --   --   --   --   --    NTBNP  --  3,598* 2,976* 2,870*   < >  --     < > = values in this interval not displayed.     Recent Labs   Lab Test 05/18/23  0855   TSH 2.38     Recent Labs   Lab Test 05/24/23  0513 05/23/23  0438 05/22/23  0456   INR 2.30* 2.44* 2.96*       25 MINUTES SPENT BY ME on the date of service doing chart review, history, exam, documentation, discussion with nursing staff and specialist, & further activities per the note.  Sanjuanita Granado MD  [facility]  [phone]

## 2023-05-27 NOTE — PROGRESS NOTES
HEART CARE NOTE          Assessment/Recommendations     1. RV dysfunction/HFimpEF  Assessment / Plan  Diuresing well on current regimen - no changes at this time     2. CAD  Assessment / Plan  S/p CANDELARIA x3; denies chest pain or anginal equivalent   Continue ASA unless otherwise contraindicated     3. Atrial fibrillation  Assessment / Plan  S/p ablation x2; s/p ICD  Currently on sotalol - reduced 2/2 prolonged QTc; Coumadin management per pharmacy     4. CHRISSY on CKD   Assessment / Plan  Improving with ongoing diuresis; Continue to monitor renal function closely      5. Moderate-severe Pulmonary HTN  Assessment / Plan  Primarily WHO group 3 - follows with Dr. Payton in pulmonary clinic and Dr. Morales; inpatient pulmonary team evaluated patient during this admission    History of Present Illness/Subjective      Mr. Buck Sinclair is a 78 year old male with a PMHx significant for (per Dr. Garrett's note)  HFrEF/RV dysfunction due to nonischemic cardiomyopathy (out of proportion to CAD) with LVEF most recently noted to be 50%, persistent AF s/p multiple electrical cardioversions and extensive catheter ablations x2 in 2011, CAD s/p CANDELARIA x3 to the [id number] LAD, COPD on home O2, Medtronic dual-chamber permanent pacemaker placement in 1999 replaced with a Medtronic dual-chamber implantable cardiac defibrillator on 6/11/2014, PHTN, HTN, and HLD presenting in acute hypoxic respiratory failure in the setting of ADHF     Today, Mr. Bone denies any acute cardiac events or complaints; Management plan as detailed above     ECG: Personally reviewed. Paced rhythm     ECHO (personnaly Reviewed):  Left ventricular size, wall motion and function are normal. The ejection  fraction is 55-60%.  Global right ventricular function is mildly to moderately reduced.  Moderate tricuspid insufficiency is present.  RVSP is estimated 73mmHg.Moderate pulmonary hypertension is present.  IVC diameter >2.1 cm collapsing <50% with sniff  "suggests a high RA pressure  estimated at 15 mmHg or greater.     When compared with prior study of 12/08/2021, filling pressures/PASP are  higher now.          Physical Examination Review of Systems   /80 (BP Location: Right arm)   Pulse 76   Temp 97.8  F (36.6  C) (Oral)   Resp 20   Ht 1.905 m (6' 3\")   Wt 112.8 kg (248 lb 10.9 oz)   SpO2 93%   BMI 31.08 kg/m    Body mass index is 31.08 kg/m .  Wt Readings from Last 3 Encounters:   05/27/23 112.8 kg (248 lb 10.9 oz)   11/16/22 123.3 kg (271 lb 14.4 oz)   11/08/22 118.8 kg (262 lb)     General Appearance:   no distress, normal body habitus   ENT/Mouth: membranes moist, no oral lesions or bleeding gums.      EYES:  no scleral icterus, normal conjunctivae   Neck: no carotid bruits or thyromegaly   Chest/Lungs:   lungs are clear to auscultation, no rales or wheezing, equal chest wall expansion    Cardiovascular:   Regular. Normal first and second heart sounds with no murmurs, rubs, or gallops; the carotid, radial and posterior tibial pulses are intact, + JVD and trace LE edema bilaterally    Abdomen:  no organomegaly, masses, bruits, or tenderness; bowel sounds are present   Extremities: no cyanosis or clubbing   Skin: no xanthelasma, warm.    Neurologic: alert and oriented x3, calm     Psychiatric: alert and oriented x3, calm     A complete 10 systems ROS was reviewed  And is negative except what is listed in the HPI.          Medical History  Surgical History Family History Social History   Past Medical History:   Diagnosis Date     Anemia      Asthma without status asthmaticus 5/5/2021     BPH (benign prostatic hyperplasia)      Cardiomyopathy (H)      COPD, group B, by GOLD 2017 classification (H)      Coronary artery disease due to calcified coronary lesion      Dyslipidemia, goal LDL below 70      Essential hypertension      History of transfusion      Persistent atrial fibrillation (H)      Pneumonia of left lower lobe due to infectious organism " 10/4/2017     Skin cancer of trunk      Status post catheter ablation of atrial fibrillation 6/7/2017    PVI 4-2011 (Cryo/PVI + roof line + CTI line) Re-do PVI 7-2011 (RFA/PVI + CFE + VIDYA + confirmed CTI line)     Ventricular tachycardia (H)     Past Surgical History:   Procedure Laterality Date     CARDIAC DEFIBRILLATOR PLACEMENT       CARDIOVERSION  07/11/2018    x20, last 2/12/15, 10/2015, 11/18/16, 6/16/17 by Lauren Foster CNP     CARDIOVERSION  07/11/2018     CARDIOVERSION  11/19/2021     COLONOSCOPY N/A 4/28/2017    Procedure: COLONOSCOPY with 2 ascending polyps and 1 transverse polyp;  Surgeon: Jose Whittington MD;  Location: [facility];  Service:      CV CORONARY ANGIOGRAM N/A 9/20/2017    Procedure: Coronary Angiogram;  Surgeon: Sergio Cervantes MD;  Location: [facility] Cath Lab;  Service:      CV CORONARY ANGIOGRAM N/A 1/24/2022    Procedure: Coronary Angiogram;  Surgeon: Christi Saunders MD;  Location: [facility] CATH LAB CV     CV LEFT HEART CATH N/A 1/24/2022    Procedure: Left Heart Cath;  Surgeon: Christi Saunders MD;  Location: [facility] CATH LAB CV     CV RIGHT AND LEFT HEART CATH N/A 1/24/2022    Procedure: Right and Left Heart Catherization;  Surgeon: Christi Saunders MD;  Location: [facility] CV     EP ICD GENERATOR REPLACEMENT DUAL N/A 10/28/2022    Procedure: Implantable Cardioverter Defibrillator Generator Replacement Dual;  Surgeon: Elo Collins MD;  Location: [facility] CATH [facility] CV     EP ICD INSERT       FRACTURE SURGERY Left     wrist     INGUINAL HERNIA REPAIR Left 1967    while in the [employer] in [place] after 13 month in [place]     INSERT / REPLACE / REMOVE PACEMAKER       IR MISCELLANEOUS PROCEDURE  4/30/2014     OTHER SURGICAL HISTORY      left hand surgery---tendon repair     MD ABLATE HEART DYSRHYTHM FOCUS  04/2011    Catheter Ablation Atrial Fibrillation PVI Apr 2011 (Cryo+RF-PVI + roof line + CTI line)     MD ABLATE HEART DYSRHYTHM FOCUS  07/2011    Re-do PVI Jul 2011  (RFA-PVI + CFE + VIDYA + confirmation of CTI line)     TOTAL SHOULDER REPLACEMENT Right 2016    Dr. Abernathy of [facility]     WRIST SURGERY Left      ZZC MYERS W/O FACETEC FORAMOT/DSKC  VRT SEG, CERVICAL      Laminectomy Lumbar;  Recorded: 2012;    no family history of premature coronary artery disease Social History     Socioeconomic History     Marital status:      Spouse name: Not on file     Number of children: Not on file     Years of education: Not on file     Highest education level: Not on file   Occupational History     Not on file   Tobacco Use     Smoking status: Former     Packs/day: 1.00     Years: 4.00     Pack years: 4.00     Types: Cigarettes     Quit date: 1968     Years since quittin.4     Smokeless tobacco: Never   Vaping Use     Vaping status: Never Used   Substance and Sexual Activity     Alcohol use: Yes     Alcohol/week: 2.0 standard drinks of alcohol     Comment: Alcoholic Drinks/day: 1 beer per week     Drug use: No     Sexual activity: Yes     Partners: Female     Birth control/protection: Post-menopausal   Other Topics Concern     Parent/sibling w/ CABG, MI or angioplasty before 65F 55M? Not Asked   Social History Narrative    Preloaded 2013     Social Determinants of Health     Financial Resource Strain: Not on file   Food Insecurity: Not on file   Transportation Needs: Not on file   Physical Activity: Not on file   Stress: Not on file   Social Connections: Not on file   Intimate Partner Violence: Not on file   Housing Stability: Not on file           Lab Results    Chemistry/lipid CBC Cardiac Enzymes/BNP/TSH/INR   Lab Results   Component Value Date    CHOL 109 10/06/2022    HDL 58 10/06/2022    TRIG 39 10/06/2022    BUN 49.6 (H) 2023     2023    CO2 34 (H) 2023    Lab Results   Component Value Date    WBC 10.9 2023    HGB 12.2 (L) 2023    HCT 39.0 (L) 2023    MCV 99 2023     (L)  05/25/2023    Lab Results   Component Value Date     (H) 12/01/2020    TSH 2.38 05/18/2023    INR 3.32 (H) 05/27/2023     No results found for: CKTOTAL, CKMB, TROPONINI       Weight:    Wt Readings from Last 3 Encounters:   05/27/23 112.8 kg (248 lb 10.9 oz)   11/16/22 123.3 kg (271 lb 14.4 oz)   11/08/22 118.8 kg (262 lb)       Allergies  Allergies   Allergen Reactions     Adhesive Tape Other (See Comments)     ADHESIVE TAPE; SKIN IRRITATION; Skin pulled off with foam tape       Amiodarone      ADVERSE REACTION.  Sunlight sensitivity.     Lisinopril          Surgical History  Past Surgical History:   Procedure Laterality Date     CARDIAC DEFIBRILLATOR PLACEMENT       CARDIOVERSION  07/11/2018    x20, last 2/12/15, 10/2015, 11/18/16, 6/16/17 by Lauren Foster CNP     CARDIOVERSION  07/11/2018     CARDIOVERSION  11/19/2021     COLONOSCOPY N/A 4/28/2017    Procedure: COLONOSCOPY with 2 ascending polyps and 1 transverse polyp;  Surgeon: Jose Whittington MD;  Location: [facility];  Service:      CV CORONARY ANGIOGRAM N/A 9/20/2017    Procedure: Coronary Angiogram;  Surgeon: Sergio Cervantes MD;  Location: [facility] Cath Lab;  Service:      CV CORONARY ANGIOGRAM N/A 1/24/2022    Procedure: Coronary Angiogram;  Surgeon: Christi Saunders MD;  Location: [facility] CV     CV LEFT HEART CATH N/A 1/24/2022    Procedure: Left Heart Cath;  Surgeon: Christi Saunders MD;  Location: [facility] LAB CV     CV RIGHT AND LEFT HEART CATH N/A 1/24/2022    Procedure: Right and Left Heart Catherization;  Surgeon: Christi Saunders MD;  Location: [facility] CV     EP ICD GENERATOR REPLACEMENT DUAL N/A 10/28/2022    Procedure: Implantable Cardioverter Defibrillator Generator Replacement Dual;  Surgeon: Elo Collins MD;  Location: [facility] CV     EP ICD INSERT       FRACTURE SURGERY Left     wrist     INGUINAL HERNIA REPAIR Left 1967    while in the [employer] in [place] after 13 month in [place]      INSERT / REPLACE / REMOVE PACEMAKER       IR MISCELLANEOUS PROCEDURE  4/30/2014     OTHER SURGICAL HISTORY      left hand surgery---tendon repair     CO ABLATE HEART DYSRHYTHM FOCUS  04/2011    Catheter Ablation Atrial Fibrillation PVI Apr 2011 (Cryo+RF-PVI + roof line + CTI line)     CO ABLATE HEART DYSRHYTHM FOCUS  07/2011    Re-do PVI Jul 2011 (RFA-PVI + CFE + VIDYA + confirmation of CTI line)     TOTAL SHOULDER REPLACEMENT Right 03/03/2016    Dr. Abernathy of [facility]     WRIST SURGERY Left      ZC MYERS W/O FACETEC FORAMOT/DSKC 1/2 VRT SEG, CERVICAL      Laminectomy Lumbar;  Recorded: 03/09/2012;       Social History  Tobacco:   History   Smoking Status     Former     Packs/day: 1.00     Years: 4.00     Types: Cigarettes     Quit date: 1/1/1968   Smokeless Tobacco     Never    Alcohol:   Social History    Substance and Sexual Activity      Alcohol use: Yes        Alcohol/week: 2.0 standard drinks of alcohol        Comment: Alcoholic Drinks/day: 1 beer per week   Illicit Drugs:   History   Drug Use No       Family History  Family History   Problem Relation Age of Onset     Cancer Mother         leukemia     Cancer Father         bladder     Cancer Sister         breast with lung met.     Aneurysm Sister      CABG Brother      CABG Brother      Valvular heart disease Brother         valve replacement          Deon Iglesias MD on 5/27/2023      cc: Vivek Guerrero

## 2023-05-27 NOTE — PROGRESS NOTES
PULMONARY PROGRESS NOTE    Date / Time of Admission:  5/18/2023  8:33 AM  Assessment:   78yoM with history of COPD in addition to environmental exposures (fire, agent orange) with WHO II+ III pulmonary hypertension here with RV failure and volume overload causing worsening hypoxia. Agree that COPD is less of a  of his hypoxia than his pulmonary hypertension.     Principal Problem:    Acute on chronic congestive heart failure, unspecified heart failure type (H)  Active Problems:    Cardiomyopathy (H)    Essential hypertension    Persistent Atrial Fibrillation    CKD (chronic kidney disease) stage 3, GFR 30-59 ml/min (H)    Acute on chronic respiratory failure with hypoxia (H)      Advance Directives:  Full Code    Plan:   Complete prednisone taper  Diuresis per cardiology. Creatinine has tolerated the 13L of fluid that has been removed so far.  Unclear to me what his true baseline will be. 5L may be the lowest we can diurese him to (could get 10L concentrator at home)  I think discharge home would be reasonable at 5L once diuretic regimen clear.     Subjective:   HPI:  Buck Sinclair is a 78 year old male with chronic hypoxic respiratory failure on 10 L/min NC since December 2022, COPD GOLD B, history of environmental exposures (Agent Orange, dust/fire inhalation), very remote tobacco use (quit 1968), coronary artery disease s/p PCI/stents, atrial fibrillation s/p PVL with ICD/PPM on chronic anticoagulation with warfarin, mild-moderate RV dysfunction, WHO Group III pulmonary hypertension (mPAP 37 mmHg 01/2022), history of LLL pneumonia and cavitary lesion 10/2017, recurrent LLL pneumonia that subsequently resolved who presented to [facility] ED on 5/18/2023 from Pulmonary Clinic with hypoxia/cyanosis (O2 sat 70s%), admitted for acute on chronic hypoxic respiratory failure and heart failure exacerbation.    Since admission he was placed on high-flow oxygen but has been weaned back to 10L with the  aid of diuresis.  On bumex drip with improvement in creatinine now down to 5L O2 but still quite hypoxic.         Allergies:   Allergies   Allergen Reactions     Adhesive Tape Other (See Comments)     ADHESIVE TAPE; SKIN IRRITATION; Skin pulled off with foam tape       Amiodarone      ADVERSE REACTION.  Sunlight sensitivity.     Lisinopril         MEDS:  Scheduled Meds:    aspirin  81 mg Oral Daily     atorvastatin  40 mg Oral QPM     doxycycline monohydrate  100 mg Oral Q12H JOSE (08/20)     FLUoxetine  20 mg Oral Daily     [Held by provider] fluticasone-vilanterol  1 puff Inhalation Daily     guaiFENesin  600 mg Oral BID     ipratropium  0.5 mg Nebulization 4x daily     levalbuterol  1.25 mg Nebulization 4x Daily     [Held by provider] losartan  25 mg Oral Daily     miconazole   Topical BID     pantoprazole  40 mg Oral QAM AC     polyethylene glycol  17 g Oral Daily     [START ON 5/29/2023] predniSONE  10 mg Oral Daily     predniSONE  20 mg Oral Daily     senna-docusate  2 tablet Oral BID     sodium chloride  1 spray Both Nostrils 4x Daily     sodium chloride (PF)  3 mL Intracatheter Q8H     sotalol  120 mg Oral Q24H     umeclidinium  1 puff Inhalation Daily     Warfarin Therapy Reminder  1 each Oral See Admin Instructions     warfarin-No DOSE today  1 each Does not apply no dose today (warfarin)     Continuous Infusions:    bumetanide 1 mg/hr (05/27/23 0950)     Continuing ACE inhibitor/ARB/ARNI from home medication list OR ACE inhibitor/ARB order already placed during this visit       - MEDICATION INSTRUCTIONS -       PRN Meds:.acetaminophen, albuterol, bisacodyl, Continuing ACE inhibitor/ARB/ARNI from home medication list OR ACE inhibitor/ARB order already placed during this visit, - MEDICATION INSTRUCTIONS -, levalbuterol, lidocaine 4%, lidocaine (buffered or not buffered), melatonin, ondansetron **OR** ondansetron, sodium chloride (PF)    Objective:   VITALS:  /76 (BP Location: Right arm)   Pulse 79    "Temp 97.6  F (36.4  C) (Oral)   Resp 20   Ht 1.905 m (6' 3\")   Wt 113.7 kg (250 lb 9.6 oz)   SpO2 94%   BMI 31.32 kg/m    EXAM:    GENERAL APPEARANCE: Alert, no acute distress  HENT: white patches on roof of mouth, tongue  NECK: No adenopathy,masses or thyromegaly  RESP: decreased breath sounds.   CV: regular rate and rhythm, no murmur, rub or gallop  ABDOMEN: normal bowel sounds, soft, nontender, no hepatosplenomegaly or other masses  LYMPHATICS: No cervical, or supraclavicular adenopathy  SKIN: no suspicious lesions or rashes  NEURO: Alert, oriented x 3, speech and mentation normal        I&O:    Date 05/22/23 0700 - 05/23/23 0659   Shift 2164-2078 1770-6611 5829-7056 24 Hour Total   INTAKE   P.O. 520   520   Shift Total(mL/kg) 520(4.31)   520(4.31)   OUTPUT   Urine 1400   1400   Shift Total(mL/kg) 1400(11.61)   1400(11.61)   Weight (kg) 120.6 120.6 120.6 120.6       Data Review:    Imaging: personally reviewed  Chest   EXAM: CT CHEST W/O CONTRAST  LOCATION: [facility]  DATE/TIME: 5/19/2023 10:38 AM CDT     INDICATION: dyspnea, hypoxia, CHF, possible pneumonia  COMPARISON: CT of the chest 08/17/2022  TECHNIQUE: CT chest without IV contrast. Multiplanar reformats were obtained. Dose reduction techniques were used.  CONTRAST: None.     FINDINGS:   LUNGS AND PLEURA: Unchanged parenchymal heterogeneity relatively lucent apices and hazy groundglass attenuation within the bases. There are areas of subpleural architectural distortion and opacity in both bases consistent with cicatrization atelectasis.   Such opacity in the medial right lower lobe is associated with crowding of bronchovascular structures and a few internal air bronchograms. These findings are unchanged. No new airspace opacities or interlobular septal thickening. Trachea and central   airways are patent and normal caliber. No endoluminal airway material.     MEDIASTINUM: Right subclavian approach dual chamber pacemaker has " right atrial appendage and right ventricular leads. Severe enlargement of the right atrium. Mild enlargement of the left atrium. There are left atrial wall calcifications, likely sequela   of prior ablation procedure. Dilated main and central pulmonary arteries. Mild calcification of aortic and mitral annuli. No pericardial effusion or thickening. Hilar and mediastinal lymph nodes are normal by size criteria. Esophagus is decompressed.     CORONARY ARTERY CALCIFICATION: Severe.     UPPER ABDOMEN: Symmetric renal atrophy.     MUSCULOSKELETAL: Unchanged thoracic spine degenerative osteophytes from T3 through the thoracolumbar junction. No vertebral compression deformity. There is a right shoulder joint replacement.                                                                      IMPRESSION:      1.  Unchanged bibasilar peripheral parenchymal scarring. No acute lung, airway, or pleural space abnormality.  2.  Biatrial and right ventricular heart enlargement. Dilated central pulmonary arteries. Findings are consistent with acute pulmonary hypertension. Calcification in the walls of the left atrium likely from prior ablation procedure.      Results for orders placed or performed during the hospital encounter of 05/18/23   Basic metabolic panel   Result Value Ref Range    Sodium 145 136 - 145 mmol/L    Potassium 3.4 3.4 - 5.3 mmol/L    Chloride 99 98 - 107 mmol/L    Carbon Dioxide (CO2) 34 (H) 22 - 29 mmol/L    Anion Gap 12 7 - 15 mmol/L    Urea Nitrogen 49.6 (H) 8.0 - 23.0 mg/dL    Creatinine 1.54 (H) 0.67 - 1.17 mg/dL    Calcium 8.9 8.8 - 10.2 mg/dL    Glucose 122 (H) 70 - 99 mg/dL    GFR Estimate 46 (L) >60 mL/min/1.73m2     Lab Results   Component Value Date    WBC 10.9 05/25/2023    HGB 12.2 (L) 05/25/2023    HCT 39.0 (L) 05/25/2023    MCV 99 05/25/2023     (L) 05/25/2023     Last Arterial Blood Gas:  No lab results found in last 7 days.

## 2023-05-27 NOTE — PROGRESS NOTES
..  Heart Failure Care Map  GOALS TO BE MET BEFORE DISCHARGE:    1. Decrease congestion and/or edema with diuretic therapy to achieve near optimal volume status.     Dyspnea improved: No, further care required to meet this goal. Please explain 0n 8-6 L of O2    Edema improved: No, further care required to meet this goal. Please explain On Bumex gtt at 1 mg          Last 24 hour I/O:   Intake/Output Summary (Last 24 hours) at 5/27/2023 1644  Last data filed at 5/27/2023 1439  Gross per 24 hour   Intake 1276 ml   Output 4550 ml   Net -3274 ml           Net I/O and Weights since admission:   04/27 2300 - 05/27 2259  In: 77950 [P.O.:86571; I.V.:1009]  Out: 87249 [Urine:98980]  Net: -10036     Vitals:    05/18/23 0839 05/18/23 1815 05/19/23 0412 05/20/23 0348   Weight: 130.6 kg (288 lb) 122 kg (268 lb 15.4 oz) 123.5 kg (272 lb 4.3 oz) 121.9 kg (268 lb 11.9 oz)    05/21/23 0335 05/22/23 0616 05/23/23 0653 05/24/23 0341   Weight: 121.8 kg (268 lb 8.3 oz) 120.6 kg (265 lb 14 oz) 120.3 kg (265 lb 4.8 oz) 117 kg (257 lb 15 oz)    05/25/23 0358 05/25/23 1111 05/26/23 0345 05/27/23 0346   Weight: 117 kg (257 lb 15 oz) 116.7 kg (257 lb 3.2 oz) 116.6 kg (257 lb 0.9 oz) 112.8 kg (248 lb 10.9 oz)    05/27/23 1142   Weight: 113.7 kg (250 lb 9.6 oz)       2.  O2 sats > 90% on room air, or at prior home O2 therapy level.      Able to wean O2 this shift to keep sats above 90%?: No, further care required to meet this goal. Please explain still needing 6-8 L of O2   Does patient use Home O2? Yes-            Current oxygenation status:   SpO2: 92 %     O2 Device: High Flow Nasal Cannula (HFNC), Oxygen Delivery: 5 LPM    3.  Tolerates ambulation and mobility near baseline.     Ambulation: No, further care required to meet this goal. Please explain Pt declined to walk in the hallway today but was p in the recliner    Times patient ambulated with staff this shift: 0    Please review the Heart Failure Care Map for additional HF goal  outcomes.  At 1500 O2 was placed to 5L and O2 saturation was stable at 94% at 1800 it was turned down to 4L  And O2 saturation 89-89 and will continue to monitor  Shraddha Michael RN  5/27/2023

## 2023-05-27 NOTE — PROGRESS NOTES
RESPIRATORY CARE NOTE:    Pt currently on 6L nasal cannula (HFNC), BS are diminished. Given Atrovent/Xopenex nebulizer treatment QID. No change in BS post treatment. Pt tolerated well.     Krysten Jimenez, RT

## 2023-05-27 NOTE — PLAN OF CARE
Heart Failure Care Map  GOALS TO BE MET BEFORE DISCHARGE:    1. Decrease congestion and/or edema with diuretic therapy to achieve near optimal volume status.     Dyspnea improved: No, further care required to meet this goal. Please explain SOB with activity.    Edema improved: No, further care required to meet this goal. Please explain +2 edema on BLE's.         Last 24 hour I/O:   Intake/Output Summary (Last 24 hours) at 5/26/2023 2219  Last data filed at 5/26/2023 1805  Gross per 24 hour   Intake 1190 ml   Output 2950 ml   Net -1760 ml           Net I/O and Weights since admission:   04/26 2300 - 05/26 2259  In: 75670 [P.O.:82321; I.V.:809]  Out: 33431 [Urine:01554]  Net: -37477     Vitals:    05/18/23 0839 05/18/23 1815 05/19/23 0412 05/20/23 0348   Weight: 130.6 kg (288 lb) 122 kg (268 lb 15.4 oz) 123.5 kg (272 lb 4.3 oz) 121.9 kg (268 lb 11.9 oz)    05/21/23 0335 05/22/23 0616 05/23/23 0653 05/24/23 0341   Weight: 121.8 kg (268 lb 8.3 oz) 120.6 kg (265 lb 14 oz) 120.3 kg (265 lb 4.8 oz) 117 kg (257 lb 15 oz)    05/25/23 0358 05/25/23 1111 05/26/23 0345   Weight: 117 kg (257 lb 15 oz) 116.7 kg (257 lb 3.2 oz) 116.6 kg (257 lb 0.9 oz)       2.  O2 sats > 90% on room air, or at prior home O2 therapy level.      Able to wean O2 this shift to keep sats above 90%?: No, further care required to meet this goal. Please explain High flow nasal cannula in place @ 10L.   Does patient use Home O2? Yes-  6L O2 via nasal cannula at baseline.          Current oxygenation status:   SpO2: 90 %     O2 Device: High Flow Nasal Cannula (HFNC) (Psuedo), Oxygen Delivery: 6 LPM    3.  Tolerates ambulation and mobility near baseline.     Ambulation: No, further care required to meet this goal. Please explain Patient did not ambulate this shift.    Times patient ambulated with staff this shift: 0    Please review the Heart Failure Care Map for additional HF goal outcomes.    Natalie Trujillo RN  5/26/2023     Assumed care 1900 to 0730.  A&O x 4. Stand by assist with a walker. Tele is V-paced. Denies pain. Call light within reach, able to make needs known. Bed alarm on for safety.

## 2023-05-28 NOTE — PROGRESS NOTES
[facility]    PROGRESS NOTE - Hospitalist Service    Assessment and Plan    Principal Problem:    Acute on chronic congestive heart failure, unspecified heart failure type (H)  Active Problems:    Cardiomyopathy (H)    Essential hypertension    Persistent Atrial Fibrillation    CKD (chronic kidney disease) stage 3, GFR 30-59 ml/min (H)    Acute on chronic respiratory failure with hypoxia (H)    Buck Sinclair is a 78 year old male with h/o hypertension, persistent atrial fibrillation, CAD, COPD, and CHF who presents to this ED by ambulance for evaluation of shortness of breath.       HFrEF with ADHF: deemed nonischemic dilated cardiomyopathy.    -TTE (9/13/2022) LVEF 55 to 60%, moderate TI, global RV function mild-moderately reduced, RSVP approximately 73 mmHg indicative of moderate pulmonary hypertension, estimated RA pressure 15 mmHg or greater.  -TTE (5/18/23) showed LVEF 55-60%, RV mod dilated, mod decreased RVS function, mod LAD, severe RAD, 2-3+ TR, severe PHTN, ~RAP >15mmHg  - retagged cards and bumex infusion with good diuresis renal function stable   - losartan on hold   - trend renal function  - potassium replacement  - continue telemetry     Acute on chronic hypoxic respiratory failure  Severe COPD   Moderate-to-severe pulmonary hypertension  -has been on oxygen chronically and for past several months was using 10L PNC?  -Acute decline due to volume overload in the setting of poor cardiopulmonary status chronically.  -required initially BiPAP on admission followed by HFNC and weaned to 8L on 5/22.  -Chest x-ray in emergency department showed new patchy airspace opacities in right lower lobe suggestive of area of pneumonitis/early proximal pneumonia.  Emphysematous changes. Cardiomegaly and mild pulmonary vascular congestion.    -CT Chest w/o contrast due to CHRISSY (5/19/23) showed Unchanged bibasilar peripheral parenchymal scarring. No acute lung, airway, or pleural space  abnormality. Biatrial and right ventricular heart enlargement. Dilated central pulmonary arteries.   -Repeat limited TTE with bubble study performed on 5/21/23 which was negative for interatrial shunt.  - hold Breo-Ellipta and Incruse Ellipta while on scheduled xopenex/atrovent nebs TID  -prednisone taper as ordered  -discussed with Pulm and agrees with diuresing, can sign off ofr now nothing further for them to add and he has follow up in a couple of weeks  - bronchial hygiene ordered   - VQ scan low probability for PE  - consider repeat sleep study despite 2015 unremarkable with elevated pulm HTN referral at discharge      Possible CAP:   -Chest x-ray in emergency department showed new patchy airspace opacities in right lower lobe suggestive of area of pneumonitis/early proximal pneumonia.  Emphysematous changes. Cardiomegaly and mild pulmonary vascular congestion.    -CT Chest w/o contrast due to CHRISSY (5/19/23) showed Unchanged bibasilar peripheral parenchymal scarring. No acute lung, airway, or pleural space abnormality. Biatrial and right ventricular heart enlargement. Dilated central pulmonary arteries.   -Blood culture x2 ngtd, procalcitonin 0.01, respiratory panel PCR negative, MRSA nares negative.   -Empiric IV zosyn/Doxy stopped 5/23  -Augmentin/Doxy x 5 days from 5/23 will finish course for completeness      Coronary artery disease: S/P CANDELARIA x3.  Coronary angiogram 1/24/2022 showed mild CAD with patent LAD stents in the proximal and mid LAD, left circumflex third obtuse marginal branch 30% stenosis, mid RCA lesion 20% stenosis.  LVEDP and PCWP normal.  PAP 66/24 mmHg with a mean pressure of 37 mmHg.  Shikha and TD cardiac outputs both 5.7 L/min.  -On warfarin, sotalol, statin  -start ASA 81mg qd  - No anginal complaints  - Troponin T-HS 31 ->30     Medtronic dual-chamber implantable cardiac defibrillator: -Indication:  primary prevention of sudden cardiac death.  -placed on 6/11/2014 with generator change on  "10/28/2022  -device interrogation on 5/16/23 reviewed.      Persistent atrial fibrillation;  Supratherapeutic anticoagulation- resolved.  -per Dr. Garrett's last note, status post multiple electrical cardioversions, most recently on 11/19/2021. He underwent complex catheter ablation x2 in 2011. Overall fairly low burden of atrial arrhythmia based on his device interrogations. [id number] score is at least 5 (congestive heart failure, hypertension, age 75 or greater, coronary artery disease).  -warfarin, pharmacy to dose  --Sotalol dose reduced to 120mg daily     CHRISSY on CKD stage III: CHRISSY due to IV diuresis with net negative fluid loss of .  Admission Cr 1.64 carlo to peak of 1.96 (5/22) -> 1.88 today after IV diuresis was stopped on 5/22.  Baseline creatinine 1.5-1.6.   -hold Losartan until renal recovery near baseline then resume  - IV bumex continued and renal function stable   - trend      HTN:  -Sotalol dose reduced to 120mg q24hrs due to CrCl.  - bumex infusion   - holding losartan      Oral Candidiasis:  -Nystatin Sw/Sw x 5 days     HLD:  -lipitor     Chronic macrocytic anemia, acute thrombocytopenia:  Platelets 139. Dale 123 (5/22/23) TSH normal.  Hgb stable at ~11. Baseline 10-11.  B12/Folate high. Normal ptt, fibrinogen, and D Dimer makes DIC less likely.      Hypokalemia:  -monitor and replete per protocol    Clinically Significant Risk Factors      # Overweight: Estimated body mass index is 28.31 kg/m  as calculated from the following:  Height as of this encounter: 1.626 m (5' 4\").  Weight as of this encounter: 74.8 kg (164 lb 14.4 oz)., PRESENT ON ADMISSION    COVID-19 PCR Results        11/18/2021    08:58 1/20/2022    14:41 2/17/2022    09:04 5/18/2023    16:05   COVID-19 PCR Results   SARS CoV2 PCR Negative   Negative   Negative   Negative     COVID-19 Antibody Results, Testing for Immunity         No data to display               Code Status: Full Code  VTE prophylaxis:  Warfarin  DIET: Orders Placed " This Encounter      Advance Diet as Tolerated: Regular Diet Adult; 2 gm NA Diet    Drains/Lines: none  Weight bearing status: WBAT     Expected Discharge Date: 05/29/2023    Discharge Delays: IV Medication - consider oral or Home Infusion  Lab Result Pending (enter specific test & time in comments)    Discharge Comments: High flow 8L, Bumex gtt.      Subjective:  Breathing significantly improved and on 6 liter at res, still having good urine output     PHYSICAL EXAM  Temp:  [97.5  F (36.4  C)-98.2  F (36.8  C)] 97.5  F (36.4  C)  Pulse:  [73-98] 75  Resp:  [20-22] 20  BP: ()/(65-82) 112/79  SpO2:  [90 %-96 %] 91 %  Wt Readings from Last 1 Encounters:   05/28/23 115.4 kg (254 lb 8 oz)       Intake/Output Summary (Last 24 hours) at 5/24/2023 0831  Last data filed at 5/24/2023 0728  Gross per 24 hour   Intake 1425 ml   Output 3575 ml   Net -2150 ml      Body mass index is 31.81 kg/m .    Physical Exam  Constitutional:       Comments: Chronically ill-appearing, NAD,   HENT:      Head:      Comments: Decreased facial swelling  Cardiovascular:      Rate and Rhythm: Normal rate and regular rhythm.      Pulses: Normal pulses.   Pulmonary:      Effort: Pulmonary effort is normal.      Comments: CTA b/lno W/R/R  Abdominal:      General: Bowel sounds are normal.      Palpations: Abdomen is soft.   Musculoskeletal:         General: Normal range of motion.      Comments: Minimal pedal edema   Skin:     General: Skin is warm and dry.      Capillary Refill: Capillary refill takes less than 2 seconds.   Neurological:      General: No focal deficit present.      Mental Status: He is alert and oriented to person, place, and time. Mental status is at baseline.         PERTINENT LABS/IMAGING:  Results for orders placed or performed during the hospital encounter of 05/18/23   XR Chest Port 1 View    Impression    IMPRESSION: Multi lead right subclavian venous pacemaker. Leads are intact and unchanged in position.     There are  new, patchy airspace opacities in the right lower lobe suggestive of an area of pneumonitis/early bronchopneumonia. Emphysematous changes are better appreciated on the CT.    No change in the cardiomegaly and mild pulmonary vascular congestion. No signs of failure.    CT Chest w/o Contrast    Impression    IMPRESSION:     1.  Unchanged bibasilar peripheral parenchymal scarring. No acute lung, airway, or pleural space abnormality.  2.  Biatrial and right ventricular heart enlargement. Dilated central pulmonary arteries. Findings are consistent with acute pulmonary hypertension. Calcification in the walls of the left atrium likely from prior ablation procedure.     Echocardiogram Complete   Result Value Ref Range    LVEF  55-60%    Echocardiogram Limited with Bubble Study   Result Value Ref Range    LVEF  55-60%        Recent Labs   Lab 05/28/23  0433 05/27/23  1623 05/27/23  0452 05/26/23  0438 05/25/23  0444 05/24/23  0513 05/23/23  0438 05/22/23  0456   WBC  --   --   --   --  10.9  --  9.0 9.0   HGB  --   --   --   --  12.2*  --  11.8* 11.0*   MCV  --   --   --   --  99  --  101* 101*   PLT  --   --   --   --  134*  --  139* 123*   INR 3.02*  --  3.32* 3.24* 2.70*   < > 2.44* 2.96*     --  145 144 144  --  143 145   POTASSIUM 3.6 3.7 3.4 3.5 3.7   < > 3.7 3.8   CHLORIDE 100  --  99 101 101  --  102 105   CO2 32*  --  34* 32* 34*  --  30* 29   BUN 47.1*  --  49.6* 51.7* 52.4*  --  53.9* 55.7*   CR 1.44*  --  1.54* 1.41* 1.60*   < > 1.88* 1.96*   ANIONGAP 13  --  12 11 9  --  11 11   AMBIKA 9.5  --  8.9 8.6* 8.8  --  8.9 8.5*   *  --  122* 122* 117*  --  147* 144*    < > = values in this interval not displayed.     Recent Labs   Lab Test 10/06/22  1240   CHOL 109   HDL 58   LDL 43   TRIG 39     Recent Labs   Lab Test 05/24/23  0513 05/23/23  0438   NA  --  143   POTASSIUM 3.5 3.7   CHLORIDE  --  102   CO2  --  30*   GLC  --  147*   BUN  --  53.9*   CR 1.64* 1.88*   GFRESTIMATED 43* 36*   AMBIKA  --  8.9      No results for input(s): A1C in the last 32366 hours.  Recent Labs   Lab Test 05/23/23  0438 05/22/23  0456 05/21/23  0431   HGB 11.8* 11.0* 11.2*     No results for input(s): TROPONINI in the last 09270 hours.  Recent Labs   Lab Test 05/18/23  0855 10/05/22  1002 05/17/22  1149 02/15/22  0941 02/09/22  1032 12/01/20  0958   BNP  --   --   --   --   --  265*   NTBNPI 3,644*  --   --   --   --   --    NTBNP  --  3,598* 2,976* 2,870*   < >  --     < > = values in this interval not displayed.     Recent Labs   Lab Test 05/18/23  0855   TSH 2.38     Recent Labs   Lab Test 05/24/23  0513 05/23/23  0438 05/22/23  0456   INR 2.30* 2.44* 2.96*       25 MINUTES SPENT BY ME on the date of service doing chart review, history, exam, documentation, discussion with nursing staff and specialist, & further activities per the note.  Sanjuanita Granado MD  [facility]  [phone]

## 2023-05-28 NOTE — PROGRESS NOTES
HEART CARE NOTE          Assessment/Recommendations     1. RV dysfunction/HFimpEF  Assessment / Plan  Diuresing well on current regimen - no changes at this time     2. CAD  Assessment / Plan  S/p CANDELARIA x3; denies chest pain or anginal equivalent   Continue ASA unless otherwise contraindicated     3. Atrial fibrillation  Assessment / Plan  S/p ablation x2; s/p ICD  Currently on sotalol - reduced 2/2 prolonged QTc; Coumadin management per pharmacy     4. CHRISSY on CKD   Assessment / Plan  Improving with ongoing diuresis; Continue to monitor renal function closely      5. Moderate-severe Pulmonary HTN  Assessment / Plan  Primarily WHO group 3 - follows with Dr. Payton in pulmonary clinic and Dr. Morales; inpatient pulmonary team evaluated patient during this admission    History of Present Illness/Subjective      Mr. Buck Sinclair is a 78 year old male with a PMHx significant for (per Dr. Garrett's note)  HFrEF/RV dysfunction due to nonischemic cardiomyopathy (out of proportion to CAD) with LVEF most recently noted to be 50%, persistent AF s/p multiple electrical cardioversions and extensive catheter ablations x2 in 2011, CAD s/p CANDELARIA x3 to the [id number]-[id number] LAD, COPD on home O2, Medtronic dual-chamber permanent pacemaker placement in 1999 replaced with a Medtronic dual-chamber implantable cardiac defibrillator on 6/11/2014, PHTN, HTN, and HLD presenting in acute hypoxic respiratory failure in the setting of ADHF     Today, Mr. Bone denies any acute cardiac events or complaints; Management plan as detailed above     ECG: Personally reviewed. Paced rhythm     ECHO (personnaly Reviewed):  Left ventricular size, wall motion and function are normal. The ejection  fraction is 55-60%.  Global right ventricular function is mildly to moderately reduced.  Moderate tricuspid insufficiency is present.  RVSP is estimated 73mmHg.Moderate pulmonary hypertension is present.  IVC diameter >2.1 cm collapsing <50% with sniff  "suggests a high RA pressure  estimated at 15 mmHg or greater.     When compared with prior study of 12/08/2021, filling pressures/PASP are  higher now.          Physical Examination Review of Systems   /81 (BP Location: Right arm)   Pulse 81   Temp 97.7  F (36.5  C) (Oral)   Resp 20   Ht 1.905 m (6' 3\")   Wt 115.4 kg (254 lb 8 oz)   SpO2 91%   BMI 31.81 kg/m    Body mass index is 31.81 kg/m .  Wt Readings from Last 3 Encounters:   05/28/23 115.4 kg (254 lb 8 oz)   11/16/22 123.3 kg (271 lb 14.4 oz)   11/08/22 118.8 kg (262 lb)     General Appearance:   no distress, normal body habitus   ENT/Mouth: membranes moist, no oral lesions or bleeding gums.      EYES:  no scleral icterus, normal conjunctivae   Neck: no carotid bruits or thyromegaly   Chest/Lungs:   lungs are clear to auscultation, no rales or wheezing, equal chest wall expansion    Cardiovascular:   Regular. Normal first and second heart sounds with no murmurs, rubs, or gallops; the carotid, radial and posterior tibial pulses are intact, + JVD, but no LE edema bilaterally    Abdomen:  no organomegaly, masses, bruits, or tenderness; bowel sounds are present   Extremities: no cyanosis or clubbing   Skin: no xanthelasma, warm.    Neurologic: NAD     Psychiatric: NAD     A complete 10 systems ROS was reviewed  And is negative except what is listed in the HPI.          Medical History  Surgical History Family History Social History   Past Medical History:   Diagnosis Date     Anemia      Asthma without status asthmaticus 5/5/2021     BPH (benign prostatic hyperplasia)      Cardiomyopathy (H)      COPD, group B, by GOLD 2017 classification (H)      Coronary artery disease due to calcified coronary lesion      Dyslipidemia, goal LDL below 70      Essential hypertension      History of transfusion      Persistent atrial fibrillation (H)      Pneumonia of left lower lobe due to infectious organism 10/4/2017     Skin cancer of trunk      Status post " catheter ablation of atrial fibrillation 6/7/2017    PVI 4-2011 (Cryo/PVI + roof line + CTI line) Re-do PVI 7-2011 (RFA/PVI + CFE + VIDYA + confirmed CTI line)     Ventricular tachycardia (H)     Past Surgical History:   Procedure Laterality Date     CARDIAC DEFIBRILLATOR PLACEMENT       CARDIOVERSION  07/11/2018    x20, last 2/12/15, 10/2015, 11/18/16, 6/16/17 by Lauren Foster CNP     CARDIOVERSION  07/11/2018     CARDIOVERSION  11/19/2021     COLONOSCOPY N/A 4/28/2017    Procedure: COLONOSCOPY with 2 ascending polyps and 1 transverse polyp;  Surgeon: Jose Whittington MD;  Location: [facility];  Service:      CV CORONARY ANGIOGRAM N/A 9/20/2017    Procedure: Coronary Angiogram;  Surgeon: Sergio Cervantes MD;  Location: [facility] Cath Lab;  Service:      CV CORONARY ANGIOGRAM N/A 1/24/2022    Procedure: Coronary Angiogram;  Surgeon: Christi Saunders MD;  Location: [facility] CATH LAB CV     CV LEFT HEART CATH N/A 1/24/2022    Procedure: Left Heart Cath;  Surgeon: Christi Saunders MD;  Location: [facility] CATH LAB CV     CV RIGHT AND LEFT HEART CATH N/A 1/24/2022    Procedure: Right and Left Heart Catherization;  Surgeon: Christi Saunders MD;  Location: [facility] CV     EP ICD GENERATOR REPLACEMENT DUAL N/A 10/28/2022    Procedure: Implantable Cardioverter Defibrillator Generator Replacement Dual;  Surgeon: Elo Collins MD;  Location: [facility] CATH LAB CV     EP ICD INSERT       FRACTURE SURGERY Left     wrist     INGUINAL HERNIA REPAIR Left 1967    while in the [employer] in [employer] after 13 month in [place]     INSERT / REPLACE / REMOVE PACEMAKER       IR MISCELLANEOUS PROCEDURE  4/30/2014     OTHER SURGICAL HISTORY      left hand surgery---tendon repair     IL ABLATE HEART DYSRHYTHM FOCUS  04/2011    Catheter Ablation Atrial Fibrillation PVI Apr 2011 (Cryo+RF-PVI + roof line + CTI line)     IL ABLATE HEART DYSRHYTHM FOCUS  07/2011    Re-do PVI Jul 2011 (RFA-PVI + CFE + VIDYA + confirmation of CTI line)      TOTAL SHOULDER REPLACEMENT Right 2016    Dr. Abernathy of [facility]     WRIST SURGERY Left      ZZC MYERS W/O FACETEC FORAMOT/DSKC  VRT SEG, CERVICAL      Laminectomy Lumbar;  Recorded: 2012;    no family history of premature coronary artery disease Social History     Socioeconomic History     Marital status:      Spouse name: Not on file     Number of children: Not on file     Years of education: Not on file     Highest education level: Not on file   Occupational History     Not on file   Tobacco Use     Smoking status: Former     Packs/day: 1.00     Years: 4.00     Pack years: 4.00     Types: Cigarettes     Quit date: 1968     Years since quittin.4     Smokeless tobacco: Never   Vaping Use     Vaping status: Never Used   Substance and Sexual Activity     Alcohol use: Yes     Alcohol/week: 2.0 standard drinks of alcohol     Comment: Alcoholic Drinks/day: 1 beer per week     Drug use: No     Sexual activity: Yes     Partners: Female     Birth control/protection: Post-menopausal   Other Topics Concern     Parent/sibling w/ CABG, MI or angioplasty before 65F 55M? Not Asked   Social History Narrative    Preloaded 2013     Social Determinants of Health     Financial Resource Strain: Not on file   Food Insecurity: Not on file   Transportation Needs: Not on file   Physical Activity: Not on file   Stress: Not on file   Social Connections: Not on file   Intimate Partner Violence: Not on file   Housing Stability: Not on file           Lab Results    Chemistry/lipid CBC Cardiac Enzymes/BNP/TSH/INR   Lab Results   Component Value Date    CHOL 109 10/06/2022    HDL 58 10/06/2022    TRIG 39 10/06/2022    BUN 47.1 (H) 2023     2023    CO2 32 (H) 2023    Lab Results   Component Value Date    WBC 10.9 2023    HGB 12.2 (L) 2023    HCT 39.0 (L) 2023    MCV 99 2023     (L) 2023    Lab Results   Component Value Date      (H) 12/01/2020    TSH 2.38 05/18/2023    INR 3.02 (H) 05/28/2023     No results found for: CKTOTAL, CKMB, TROPONINI       Weight:    Wt Readings from Last 3 Encounters:   05/28/23 115.4 kg (254 lb 8 oz)   11/16/22 123.3 kg (271 lb 14.4 oz)   11/08/22 118.8 kg (262 lb)       Allergies  Allergies   Allergen Reactions     Adhesive Tape Other (See Comments)     ADHESIVE TAPE; SKIN IRRITATION; Skin pulled off with foam tape       Amiodarone      ADVERSE REACTION.  Sunlight sensitivity.     Lisinopril          Surgical History  Past Surgical History:   Procedure Laterality Date     CARDIAC DEFIBRILLATOR PLACEMENT       CARDIOVERSION  07/11/2018    x20, last 2/12/15, 10/2015, 11/18/16, 6/16/17 by Lauren Foster CNP     CARDIOVERSION  07/11/2018     CARDIOVERSION  11/19/2021     COLONOSCOPY N/A 4/28/2017    Procedure: COLONOSCOPY with 2 ascending polyps and 1 transverse polyp;  Surgeon: Jose Whittington MD;  Location: [facility];  Service:      CV CORONARY ANGIOGRAM N/A 9/20/2017    Procedure: Coronary Angiogram;  Surgeon: Sergio Cervantes MD;  Location: [facility] Cath Lab;  Service:      CV CORONARY ANGIOGRAM N/A 1/24/2022    Procedure: Coronary Angiogram;  Surgeon: Christi Saunders MD;  Location: [facility] CV     CV LEFT HEART CATH N/A 1/24/2022    Procedure: Left Heart Cath;  Surgeon: Christi Saunders MD;  Location: [facility] CATH LAB CV     CV RIGHT AND LEFT HEART CATH N/A 1/24/2022    Procedure: Right and Left Heart Catherization;  Surgeon: Christi Saunders MD;  Location: [facility] CV     EP ICD GENERATOR REPLACEMENT DUAL N/A 10/28/2022    Procedure: Implantable Cardioverter Defibrillator Generator Replacement Dual;  Surgeon: Elo Collins MD;  Location: [facility] CV     EP ICD INSERT       FRACTURE SURGERY Left     wrist     INGUINAL HERNIA REPAIR Left 1967    while in the [employer] in [place] [employer] after 13 month in [place]     INSERT / REPLACE / REMOVE PACEMAKER       IR MISCELLANEOUS PROCEDURE   4/30/2014     OTHER SURGICAL HISTORY      left hand surgery---tendon repair     AR ABLATE HEART DYSRHYTHM FOCUS  04/2011    Catheter Ablation Atrial Fibrillation PVI Apr 2011 (Cryo+RF-PVI + roof line + CTI line)     AR ABLATE HEART DYSRHYTHM FOCUS  07/2011    Re-do PVI Jul 2011 (RFA-PVI + CFE + VIDYA + confirmation of CTI line)     TOTAL SHOULDER REPLACEMENT Right 03/03/2016    Dr. Abernathy of [facility]     WRIST SURGERY Left      ZZC MYERS W/O FACETEC FORAMOT/DSKC 1/2 VRT SEG, CERVICAL      Laminectomy Lumbar;  Recorded: 03/09/2012;       Social History  Tobacco:   History   Smoking Status     Former     Packs/day: 1.00     Years: 4.00     Types: Cigarettes     Quit date: 1/1/1968   Smokeless Tobacco     Never    Alcohol:   Social History    Substance and Sexual Activity      Alcohol use: Yes        Alcohol/week: 2.0 standard drinks of alcohol        Comment: Alcoholic Drinks/day: 1 beer per week   Illicit Drugs:   History   Drug Use No       Family History  Family History   Problem Relation Age of Onset     Cancer Mother         leukemia     Cancer Father         bladder     Cancer Sister         breast with lung met.     Aneurysm Sister      CABG Brother      CABG Brother      Valvular heart disease Brother         valve replacement          Deon Iglesias MD on 5/28/2023      cc: Vivek Guerrero

## 2023-05-28 NOTE — PROGRESS NOTES
..  Heart Failure Care Map  GOALS TO BE MET BEFORE DISCHARGE:    1. Decrease congestion and/or edema with diuretic therapy to achieve near optimal volume status.     Dyspnea improved: No, further care required to meet this goal. Please explain Still needing 4-7 L of O2   Edema improved: Yes, satisfactory for discharge.        Last 24 hour I/O:   Intake/Output Summary (Last 24 hours) at 5/28/2023 1735  Last data filed at 5/28/2023 1400  Gross per 24 hour   Intake 1715 ml   Output 3600 ml   Net -1885 ml           Net I/O and Weights since admission:   04/28 2300 - 05/28 2259  In: 67415 [P.O.:93823; I.V.:1109]  Out: 34509 [Urine:61015]  Net: -19723     Vitals:    05/18/23 0839 05/18/23 1815 05/19/23 0412 05/20/23 0348   Weight: 130.6 kg (288 lb) 122 kg (268 lb 15.4 oz) 123.5 kg (272 lb 4.3 oz) 121.9 kg (268 lb 11.9 oz)    05/21/23 0335 05/22/23 0616 05/23/23 0653 05/24/23 0341   Weight: 121.8 kg (268 lb 8.3 oz) 120.6 kg (265 lb 14 oz) 120.3 kg (265 lb 4.8 oz) 117 kg (257 lb 15 oz)    05/25/23 0358 05/25/23 1111 05/26/23 0345 05/27/23 0346   Weight: 117 kg (257 lb 15 oz) 116.7 kg (257 lb 3.2 oz) 116.6 kg (257 lb 0.9 oz) 112.8 kg (248 lb 10.9 oz)    05/27/23 1142 05/28/23 0327 05/28/23 1124   Weight: 113.7 kg (250 lb 9.6 oz) 115.4 kg (254 lb 8 oz) 110.3 kg (243 lb 3.2 oz)       2.  O2 sats > 90% on room air, or at prior home O2 therapy level.      Able to wean O2 this shift to keep sats above 90%?: No, further care required to meet this goal. Please explain not able to wean O2   Does patient use Home O2? Yes-            Current oxygenation status:   SpO2: 91 %     O2 Device: High Flow Nasal Cannula (HFNC), Oxygen Delivery: 7 LPM    3.  Tolerates ambulation and mobility near baseline.     Ambulation: No, further care required to meet this goal. Please explain Up to the bathroom and up to the chair   Times patient ambulated with staff this shift: 1    Please review the Heart Failure Care Map for additional HF goal  outcomes.    Shraddha Michael RN  5/28/2023

## 2023-05-28 NOTE — PROGRESS NOTES
Pt is on a high flow nasal cannula 5-7L throughout the day, has been tolerating well. Does need an increase in O2 when asleep or napping d/t desats. Pt has been receiving Xopenex/Atrovent, pre and post-treatment breath sounds diminished with crackles. Will continue to monitor and encourage deep breathing and coughing techniques.     Yessi Álvarez, RT

## 2023-05-28 NOTE — PROGRESS NOTES
Pulmonary Update:    Diuresis ongoing  Pulmonary will sign off, already has follow up arranged for pulmonary clinic.    Delmis Umaña MD

## 2023-05-28 NOTE — PLAN OF CARE
Heart Failure Care Map  GOALS TO BE MET BEFORE DISCHARGE:    1. Decrease congestion and/or edema with diuretic therapy to achieve near optimal volume status.     Dyspnea improved: Yes, satisfactory for discharge.   Edema improved: Yes, satisfactory for discharge.        Last 24 hour I/O:   Intake/Output Summary (Last 24 hours) at 5/27/2023 2213  Last data filed at 5/27/2023 1748  Gross per 24 hour   Intake 1578 ml   Output 5350 ml   Net -3772 ml           Net I/O and Weights since admission:   04/27 2300 - 05/27 2259  In: 95975 [P.O.:11258; I.V.:1009]  Out: 92124 [Urine:57219]  Net: -23094     Vitals:    05/18/23 0839 05/18/23 1815 05/19/23 0412 05/20/23 0348   Weight: 130.6 kg (288 lb) 122 kg (268 lb 15.4 oz) 123.5 kg (272 lb 4.3 oz) 121.9 kg (268 lb 11.9 oz)    05/21/23 0335 05/22/23 0616 05/23/23 0653 05/24/23 0341   Weight: 121.8 kg (268 lb 8.3 oz) 120.6 kg (265 lb 14 oz) 120.3 kg (265 lb 4.8 oz) 117 kg (257 lb 15 oz)    05/25/23 0358 05/25/23 1111 05/26/23 0345 05/27/23 0346   Weight: 117 kg (257 lb 15 oz) 116.7 kg (257 lb 3.2 oz) 116.6 kg (257 lb 0.9 oz) 112.8 kg (248 lb 10.9 oz)    05/27/23 1142   Weight: 113.7 kg (250 lb 9.6 oz)       2.  O2 sats > 90% on room air, or at prior home O2 therapy level.      Able to wean O2 this shift to keep sats above 90%?: Yes, satisfactory for discharge.   Does patient use Home O2? Yes-  6L O2 via nasal cannula at baseline.          Current oxygenation status:   SpO2: 90 %     O2 Device: High Flow Nasal Cannula (HFNC), Oxygen Delivery: 5 LPM    3.  Tolerates ambulation and mobility near baseline.     Ambulation: No, further care required to meet this goal. Please explain Patient declined to participate in PT. Did not ambulate with staff.    Times patient ambulated with staff this shift: 0    Please review the Heart Failure Care Map for additional HF goal outcomes.    Natalie Trujillo RN  5/27/2023     Assumed care 1900 to 0730. A&O x 4. Assist x 1 with a walker. Tele is  V-paced. Denies pain. Primo fit in place to suction. 5L O2 via nasal cannula when awake. O2 needs increase when sleeping to 8L O2. Call light within reach, able to make needs known. Bed alarm on for safety.

## 2023-05-29 NOTE — PLAN OF CARE
Heart Failure Care Map  GOALS TO BE MET BEFORE DISCHARGE:    1. Decrease congestion and/or edema with diuretic therapy to achieve near optimal volume status.     Dyspnea improved: No, further care required to meet this goal. Please explain SOB with activity   Edema improved: Yes, satisfactory for discharge.        Last 24 hour I/O:   Intake/Output Summary (Last 24 hours) at 5/29/2023 0445  Last data filed at 5/29/2023 0333  Gross per 24 hour   Intake 1293 ml   Output 2350 ml   Net -1057 ml           Net I/O and Weights since admission:   04/29 0700 - 05/29 0659  In: 80848 [P.O.:06413; I.V.:1109]  Out: 40982 [Urine:35426]  Net: -20683     Vitals:    05/18/23 0839 05/18/23 1815 05/19/23 0412 05/20/23 0348   Weight: 130.6 kg (288 lb) 122 kg (268 lb 15.4 oz) 123.5 kg (272 lb 4.3 oz) 121.9 kg (268 lb 11.9 oz)    05/21/23 0335 05/22/23 0616 05/23/23 0653 05/24/23 0341   Weight: 121.8 kg (268 lb 8.3 oz) 120.6 kg (265 lb 14 oz) 120.3 kg (265 lb 4.8 oz) 117 kg (257 lb 15 oz)    05/25/23 0358 05/25/23 1111 05/26/23 0345 05/27/23 0346   Weight: 117 kg (257 lb 15 oz) 116.7 kg (257 lb 3.2 oz) 116.6 kg (257 lb 0.9 oz) 112.8 kg (248 lb 10.9 oz)    05/27/23 1142 05/28/23 0327 05/28/23 1124   Weight: 113.7 kg (250 lb 9.6 oz) 115.4 kg (254 lb 8 oz) 110.3 kg (243 lb 3.2 oz)       2.  O2 sats > 90% on room air, or at prior home O2 therapy level.      Able to wean O2 this shift to keep sats above 90%?: Yes, satisfactory for discharge.   Does patient use Home O2? Yes-  6L O2 nasal cannula at baseline.          Current oxygenation status:   SpO2: 91 %     O2 Device: High Flow Nasal Cannula (HFNC), Oxygen Delivery: 7 LPM    3.  Tolerates ambulation and mobility near baseline.     Ambulation: No, further care required to meet this goal. Please explain Patient did not ambulate this shift.   Times patient ambulated with staff this shift: 0    Please review the Heart Failure Care Map for additional HF goal outcomes.    Natalie Trujillo,  RN  5/29/2023     Assumed care 1900 to 0730. A&O x 4. Assist x 1 with a walker. Tele is V-paced. Denies pain. 5L O2 via nasal cannula. Increased O2 needs while sleeping. C/O difficulty falling asleep, PRN Melatonin given (see MAR). Bumex gtt stopped to transition to oral Bumex. Call light within reach, able to make needs known. Bed alarm on for safety.

## 2023-05-29 NOTE — PROGRESS NOTES
HEART CARE NOTE          Assessment/Recommendations     1. RV dysfunction/HFimpEF  Assessment / Plan  Diuresing well on current regimen - no changes at this time     2. CAD  Assessment / Plan  S/p CANDELARIA x3; denies chest pain or anginal equivalent   Continue ASA unless otherwise contraindicated     3. Atrial fibrillation  Assessment / Plan  S/p ablation x2; s/p ICD  Currently on sotalol - reduced 2/2 prolonged QTc; Coumadin management per pharmacy     4. CHRISSY on CKD   Assessment / Plan  Improving with ongoing diuresis; Continue to monitor renal function closely      5. Moderate-severe Pulmonary HTN  Assessment / Plan  Primarily WHO group 3 - follows with Dr. Payton in pulmonary clinic and Dr. Morales; inpatient pulmonary team evaluated patient during this admission    History of Present Illness/Subjective      Mr. Buck Sinclair is a 78 year old male with a PMHx significant for (per Dr. Garrett's note)  HFrEF/RV dysfunction due to nonischemic cardiomyopathy (out of proportion to CAD) with LVEF most recently noted to be 50%, persistent AF s/p multiple electrical cardioversions and extensive catheter ablations x2 in 2011, CAD s/p CANDELARIA x3 to the [id number] LAD, COPD on home O2, Medtronic dual-chamber permanent pacemaker placement in 1999 replaced with a Medtronic dual-chamber implantable cardiac defibrillator on 6/11/2014, PHTN, HTN, and HLD presenting in acute hypoxic respiratory failure in the setting of ADHF     Today, Mr. Bone denies any acute cardiac events or complaints; Management plan as detailed above     ECG: Personally reviewed. Paced rhythm     ECHO (personnaly Reviewed):  Left ventricular size, wall motion and function are normal. The ejection  fraction is 55-60%.  Global right ventricular function is mildly to moderately reduced.  Moderate tricuspid insufficiency is present.  RVSP is estimated 73mmHg.Moderate pulmonary hypertension is present.  IVC diameter >2.1 cm collapsing <50% with sniff  "suggests a high RA pressure  estimated at 15 mmHg or greater.     When compared with prior study of 12/08/2021, filling pressures/PASP are  higher now.          Physical Examination Review of Systems   /73 (BP Location: Right arm)   Pulse 90   Temp 97.8  F (36.6  C) (Oral)   Resp 20   Ht 1.905 m (6' 3\")   Wt 110.3 kg (243 lb 3.2 oz)   SpO2 91%   BMI 30.40 kg/m    Body mass index is 30.4 kg/m .  Wt Readings from Last 3 Encounters:   05/28/23 110.3 kg (243 lb 3.2 oz)   11/16/22 123.3 kg (271 lb 14.4 oz)   11/08/22 118.8 kg (262 lb)     General Appearance:   no distress, normal body habitus   ENT/Mouth: membranes moist, no oral lesions or bleeding gums.      EYES:  no scleral icterus, normal conjunctivae   Neck: no carotid bruits or thyromegaly   Chest/Lungs:   lungs are clear to auscultation, no rales or wheezing, equal chest wall expansion    Cardiovascular:   Regular. Normal first and second heart sounds with no murmurs, rubs, or gallops; the carotid, radial and posterior tibial pulses are intact, + JVD, but no LE edema bilaterally    Abdomen:  no organomegaly, masses, bruits, or tenderness; bowel sounds are present   Extremities: no cyanosis or clubbing   Skin: no xanthelasma, warm.    Neurologic: alert and oriented x3, calm     Psychiatric: alert and oriented x3, calm     A complete 10 systems ROS was reviewed  And is negative except what is listed in the HPI.          Medical History  Surgical History Family History Social History   Past Medical History:   Diagnosis Date     Anemia      Asthma without status asthmaticus 5/5/2021     BPH (benign prostatic hyperplasia)      Cardiomyopathy (H)      COPD, group B, by GOLD 2017 classification (H)      Coronary artery disease due to calcified coronary lesion      Dyslipidemia, goal LDL below 70      Essential hypertension      History of transfusion      Persistent atrial fibrillation (H)      Pneumonia of left lower lobe due to infectious organism " 10/4/2017     Skin cancer of trunk      Status post catheter ablation of atrial fibrillation 6/7/2017    PVI 4-2011 (Cryo/PVI + roof line + CTI line) Re-do PVI 7-2011 (RFA/PVI + CFE + VIDYA + confirmed CTI line)     Ventricular tachycardia (H)     Past Surgical History:   Procedure Laterality Date     CARDIAC DEFIBRILLATOR PLACEMENT       CARDIOVERSION  07/11/2018    x20, last 2/12/15, 10/2015, 11/18/16, 6/16/17 by Lauren Foster CNP     CARDIOVERSION  07/11/2018     CARDIOVERSION  11/19/2021     COLONOSCOPY N/A 4/28/2017    Procedure: COLONOSCOPY with 2 ascending polyps and 1 transverse polyp;  Surgeon: Jose Whittington MD;  Location: [facility];  Service:      CV CORONARY ANGIOGRAM N/A 9/20/2017    Procedure: Coronary Angiogram;  Surgeon: Sergio Cervantes MD;  Location: [facility] Cath Lab;  Service:      CV CORONARY ANGIOGRAM N/A 1/24/2022    Procedure: Coronary Angiogram;  Surgeon: Christi Saunders MD;  Location: [facility] CATH LAB CV     CV LEFT HEART CATH N/A 1/24/2022    Procedure: Left Heart Cath;  Surgeon: Christi Saunders MD;  Location: [facility] CATH LAB CV     CV RIGHT AND LEFT HEART CATH N/A 1/24/2022    Procedure: Right and Left Heart Catherization;  Surgeon: Christi Saunders MD;  Location: [facility] CV     EP ICD GENERATOR REPLACEMENT DUAL N/A 10/28/2022    Procedure: Implantable Cardioverter Defibrillator Generator Replacement Dual;  Surgeon: Elo Collins MD;  Location: [facility] CATH [facility] CV     EP ICD INSERT       FRACTURE SURGERY Left     wrist     INGUINAL HERNIA REPAIR Left 1967    while in the [employer] in [employer] after 13 month in [place]     INSERT / REPLACE / REMOVE PACEMAKER       IR MISCELLANEOUS PROCEDURE  4/30/2014     OTHER SURGICAL HISTORY      left hand surgery---tendon repair     [place] ABLATE HEART DYSRHYTHM FOCUS  04/2011    Catheter Ablation Atrial Fibrillation PVI Apr 2011 (Cryo+RF-PVI + roof line + CTI line)     [place] ABLATE HEART DYSRHYTHM FOCUS  07/2011    Re-do PVI Jul 2011  (RFA-PVI + CFE + VIDYA + confirmation of CTI line)     TOTAL SHOULDER REPLACEMENT Right 2016    Dr. Abernathy of [facility]     WRIST SURGERY Left      ZZC MYERS W/O FACETEC FORAMOT/DSKC  VRT SEG, CERVICAL      Laminectomy Lumbar;  Recorded: 2012;    no family history of premature coronary artery disease Social History     Socioeconomic History     Marital status:      Spouse name: Not on file     Number of children: Not on file     Years of education: Not on file     Highest education level: Not on file   Occupational History     Not on file   Tobacco Use     Smoking status: Former     Packs/day: 1.00     Years: 4.00     Pack years: 4.00     Types: Cigarettes     Quit date: 1968     Years since quittin.4     Smokeless tobacco: Never   Vaping Use     Vaping status: Never Used   Substance and Sexual Activity     Alcohol use: Yes     Alcohol/week: 2.0 standard drinks of alcohol     Comment: Alcoholic Drinks/day: 1 beer per week     Drug use: No     Sexual activity: Yes     Partners: Female     Birth control/protection: Post-menopausal   Other Topics Concern     Parent/sibling w/ CABG, MI or angioplasty before 65F 55M? Not Asked   Social History Narrative    Preloaded 2013     Social Determinants of Health     Financial Resource Strain: Not on file   Food Insecurity: Not on file   Transportation Needs: Not on file   Physical Activity: Not on file   Stress: Not on file   Social Connections: Not on file   Intimate Partner Violence: Not on file   Housing Stability: Not on file           Lab Results    Chemistry/lipid CBC Cardiac Enzymes/BNP/TSH/INR   Lab Results   Component Value Date    CHOL 109 10/06/2022    HDL 58 10/06/2022    TRIG 39 10/06/2022    BUN 47.1 (H) 2023     2023    CO2 32 (H) 2023    Lab Results   Component Value Date    WBC 10.9 2023    HGB 12.2 (L) 2023    HCT 39.0 (L) 2023    MCV 99 2023     (L)  05/25/2023    Lab Results   Component Value Date     (H) 12/01/2020    TSH 2.38 05/18/2023    INR 2.96 (H) 05/29/2023     No results found for: CKTOTAL, CKMB, TROPONINI       Weight:    Wt Readings from Last 3 Encounters:   05/28/23 110.3 kg (243 lb 3.2 oz)   11/16/22 123.3 kg (271 lb 14.4 oz)   11/08/22 118.8 kg (262 lb)       Allergies  Allergies   Allergen Reactions     Adhesive Tape Other (See Comments)     ADHESIVE TAPE; SKIN IRRITATION; Skin pulled off with foam tape       Amiodarone      ADVERSE REACTION.  Sunlight sensitivity.     Lisinopril          Surgical History  Past Surgical History:   Procedure Laterality Date     CARDIAC DEFIBRILLATOR PLACEMENT       CARDIOVERSION  07/11/2018    x20, last 2/12/15, 10/2015, 11/18/16, 6/16/17 by Lauren Foster CNP     CARDIOVERSION  07/11/2018     CARDIOVERSION  11/19/2021     COLONOSCOPY N/A 4/28/2017    Procedure: COLONOSCOPY with 2 ascending polyps and 1 transverse polyp;  Surgeon: Jose Whittington MD;  Location: [facility];  Service:      CV CORONARY ANGIOGRAM N/A 9/20/2017    Procedure: Coronary Angiogram;  Surgeon: Sergio Cervantes MD;  Location: [facility] Cath Lab;  Service:      CV CORONARY ANGIOGRAM N/A 1/24/2022    Procedure: Coronary Angiogram;  Surgeon: Christi Saunders MD;  Location: [facility] CV     CV LEFT HEART CATH N/A 1/24/2022    Procedure: Left Heart Cath;  Surgeon: Christi Saunders MD;  Location: [facility] LAB CV     CV RIGHT AND LEFT HEART CATH N/A 1/24/2022    Procedure: Right and Left Heart Catherization;  Surgeon: Christi Saunders MD;  Location: [facility] CV     EP ICD GENERATOR REPLACEMENT DUAL N/A 10/28/2022    Procedure: Implantable Cardioverter Defibrillator Generator Replacement Dual;  Surgeon: Elo Collins MD;  Location: [facility] CV     EP ICD INSERT       FRACTURE SURGERY Left     wrist     INGUINAL HERNIA REPAIR Left 1967    while in the [employer] in [place] after 13 month in [place]     INSERT  / REPLACE / REMOVE PACEMAKER       IR MISCELLANEOUS PROCEDURE  4/30/2014     OTHER SURGICAL HISTORY      left hand surgery---tendon repair     [place] ABLATE HEART DYSRHYTHM FOCUS  04/2011    Catheter Ablation Atrial Fibrillation PVI Apr 2011 (Cryo+RF-PVI + roof line + CTI line)     [place] ABLATE HEART DYSRHYTHM FOCUS  07/2011    Re-do PVI Jul 2011 (RFA-PVI + CFE + VIDYA + confirmation of CTI line)     TOTAL SHOULDER REPLACEMENT Right 03/03/2016    Dr. Abernathy of [facility]     WRIST SURGERY Left      ZC MYERS W/O FACETEC FORAMOT/DSKC 1/2 VRT SEG, CERVICAL      Laminectomy Lumbar;  Recorded: 03/09/2012;       Social History  Tobacco:   History   Smoking Status     Former     Packs/day: 1.00     Years: 4.00     Types: Cigarettes     Quit date: 1/1/1968   Smokeless Tobacco     Never    Alcohol:   Social History    Substance and Sexual Activity      Alcohol use: Yes        Alcohol/week: 2.0 standard drinks of alcohol        Comment: Alcoholic Drinks/day: 1 beer per week   Illicit Drugs:   History   Drug Use No       Family History  Family History   Problem Relation Age of Onset     Cancer Mother         leukemia     Cancer Father         bladder     Cancer Sister         breast with lung met.     Aneurysm Sister      CABG Brother      CABG Brother      Valvular heart disease Brother         valve replacement          Deon Iglesias MD on 5/29/2023      cc: Vivek Guerrero

## 2023-05-29 NOTE — TREATMENT PLAN
RCAT Treatment Plan    Patient Score: 7  Patient Acuity: 4    Clinical Indication for Therapy: CAD, COPD, CHF, SOB    Therapy Ordered: Atrovent and Xopenex QID    Assessment Summary: Pt remains on 7L HFNC, RR 12-20, pt is alert and oriented. CXR from 5/25 states patchy bibasilar opacities which reflect atelectasis. Pt has a strong, non productive cough. Pt uses albuterol and Spiriva inhalers at home. Pt claims to use 6-10 lpm O2 at home. RT to re-evaul in 72 hours.     Krysten Jimenez, RT

## 2023-05-29 NOTE — PROGRESS NOTES
[facility]    PROGRESS NOTE - Hospitalist Service    Assessment and Plan    Principal Problem:    Acute on chronic congestive heart failure, unspecified heart failure type (H)  Active Problems:    Cardiomyopathy (H)    Essential hypertension    Persistent Atrial Fibrillation    CKD (chronic kidney disease) stage 3, GFR 30-59 ml/min (H)    Acute on chronic respiratory failure with hypoxia (H)    Buck Sinclair is a 78 year old male with h/o hypertension, persistent atrial fibrillation, CAD, COPD, and CHF who presents to this ED by ambulance for evaluation of shortness of breath.       HFrEF with ADHF: deemed nonischemic dilated cardiomyopathy.    -TTE (9/13/2022) LVEF 55 to 60%, moderate TI, global RV function mild-moderately reduced, RSVP approximately 73 mmHg indicative of moderate pulmonary hypertension, estimated RA pressure 15 mmHg or greater.  -TTE (5/18/23) showed LVEF 55-60%, RV mod dilated, mod decreased RVS function, mod LAD, severe RAD, 2-3+ TR, severe PHTN, ~RAP >15mmHg  - Per cards continue  bumex infusion with good diuresis renal function stable   - losartan on hold   - trend renal function  - potassium replacement  - continue telemetry     Acute on chronic hypoxic respiratory failure  Severe COPD   Moderate-to-severe pulmonary hypertension  -has been on oxygen chronically and for past several months was using 10L PNC?  -Acute decline due to volume overload in the setting of poor cardiopulmonary status chronically.  -required initially BiPAP on admission followed by HFNC and weaned to 8L on 5/22.  -Chest x-ray in emergency department showed new patchy airspace opacities in right lower lobe suggestive of area of pneumonitis/early proximal pneumonia.  Emphysematous changes. Cardiomegaly and mild pulmonary vascular congestion.    -CT Chest w/o contrast due to CHRISSY (5/19/23) showed Unchanged bibasilar peripheral parenchymal scarring. No acute lung, airway, or pleural space  abnormality. Biatrial and right ventricular heart enlargement. Dilated central pulmonary arteries.   -Repeat limited TTE with bubble study performed on 5/21/23 which was negative for interatrial shunt.  - hold Breo-Ellipta and Incruse Ellipta while on scheduled xopenex/atrovent nebs TID  -prednisone taper as ordered  -discussed with Pulm and agrees with diuresing, can sign off ofr now nothing further for them to add and he has follow up in a couple of weeks  - bronchial hygiene ordered   - VQ scan low probability for PE  - consider repeat sleep study despite 2015 unremarkable with elevated pulm HTN referral at discharge      Possible CAP:   -Chest x-ray in emergency department showed new patchy airspace opacities in right lower lobe suggestive of area of pneumonitis/early proximal pneumonia.  Emphysematous changes. Cardiomegaly and mild pulmonary vascular congestion.    -CT Chest w/o contrast due to CHRISSY (5/19/23) showed Unchanged bibasilar peripheral parenchymal scarring. No acute lung, airway, or pleural space abnormality. Biatrial and right ventricular heart enlargement. Dilated central pulmonary arteries.   -Blood culture x2 ngtd, procalcitonin 0.01, respiratory panel PCR negative, MRSA nares negative.   -Empiric IV zosyn/Doxy stopped 5/23  -Augmentin/Doxy x 5 days from 5/23 will finish course for completeness      Coronary artery disease: S/P CANDELARIA x3.  Coronary angiogram 1/24/2022 showed mild CAD with patent LAD stents in the proximal and mid LAD, left circumflex third obtuse marginal branch 30% stenosis, mid RCA lesion 20% stenosis.  LVEDP and PCWP normal.  PAP 66/24 mmHg with a mean pressure of 37 mmHg.  Shikha and TD cardiac outputs both 5.7 L/min.  -On warfarin, sotalol, statin  -start ASA 81mg qd  - No anginal complaints  - Troponin T-HS 31 ->30     Medtronic dual-chamber implantable cardiac defibrillator: -Indication:  primary prevention of sudden cardiac death.  -placed on 6/11/2014 with generator change on  "10/28/2022  -device interrogation on 5/16/23 reviewed.      Persistent atrial fibrillation;  Supratherapeutic anticoagulation- resolved.  -per Dr. Garrett's last note, status post multiple electrical cardioversions, most recently on 11/19/2021. He underwent complex catheter ablation x2 in 2011. Overall fairly low burden of atrial arrhythmia based on his device interrogations. [id number] score is at least 5 (congestive heart failure, hypertension, age 75 or greater, coronary artery disease).  -warfarin, pharmacy to dose  --Sotalol dose reduced to 120mg daily     CHRISSY on CKD stage III: CHRISSY due to IV diuresis with net negative fluid loss of .  Admission Cr 1.64 carlo to peak of 1.96 (5/22) -> 1.88 today after IV diuresis was stopped on 5/22.  Baseline creatinine 1.5-1.6.   -hold Losartan until renal recovery near baseline then resume  - IV bumex continued and renal function stable   - trend      HTN:  -Sotalol dose reduced to 120mg q24hrs due to CrCl.  - bumex infusion   - holding losartan      Oral Candidiasis:  -Nystatin Sw/Sw x 5 days     HLD:  -lipitor     Chronic macrocytic anemia, acute thrombocytopenia:  Platelets 139. Dale 123 (5/22/23) TSH normal.  Hgb stable at ~11. Baseline 10-11.  B12/Folate high. Normal ptt, fibrinogen, and D Dimer makes DIC less likely.      Hypokalemia:  -monitor and replete per protocol    Clinically Significant Risk Factors      # Overweight: Estimated body mass index is 28.31 kg/m  as calculated from the following:  Height as of this encounter: 1.626 m (5' 4\").  Weight as of this encounter: 74.8 kg (164 lb 14.4 oz)., PRESENT ON ADMISSION    COVID-19 PCR Results        11/18/2021    08:58 1/20/2022    14:41 2/17/2022    09:04 5/18/2023    16:05   COVID-19 PCR Results   SARS CoV2 PCR Negative   Negative   Negative   Negative     COVID-19 Antibody Results, Testing for Immunity         No data to display               Code Status: Full Code  VTE prophylaxis:  Warfarin  DIET: Orders Placed " This Encounter      Advance Diet as Tolerated: Regular Diet Adult; 2 gm NA Diet    Drains/Lines: none  Weight bearing status: WBAT     Expected Discharge Date: 05/30/2023    Discharge Delays: IV Medication - consider oral or Home Infusion  Lab Result Pending (enter specific test & time in comments)    Discharge Comments: High flow 8L, Bumex gtt.      Subjective:  Breathing significantly improved requiring 5-6 liters at baseline continuously but stil may need more when ambulating     PHYSICAL EXAM  Temp:  [97.5  F (36.4  C)-98.5  F (36.9  C)] 97.7  F (36.5  C)  Pulse:  [75-95] 82  Resp:  [19-20] 19  BP: (107-127)/(72-87) 116/76  SpO2:  [87 %-91 %] 90 %  Wt Readings from Last 1 Encounters:   05/29/23 115.2 kg (254 lb)       Intake/Output Summary (Last 24 hours) at 5/24/2023 0831  Last data filed at 5/24/2023 0728  Gross per 24 hour   Intake 1425 ml   Output 3575 ml   Net -2150 ml      Body mass index is 31.75 kg/m .    Physical Exam  Constitutional:       Comments: Chronically ill-appearing, NAD,   HENT:      Head:      Comments: Decreased facial swelling  Cardiovascular:      Rate and Rhythm: Normal rate and regular rhythm.      Pulses: Normal pulses.   Pulmonary:      Effort: Pulmonary effort is normal.      Comments: CTA b/lno W/R/R  Abdominal:      General: Bowel sounds are normal.      Palpations: Abdomen is soft.   Musculoskeletal:         General: Normal range of motion.      Comments: Edema resolved    Skin:     General: Skin is warm and dry.      Capillary Refill: Capillary refill takes less than 2 seconds.   Neurological:      General: No focal deficit present.      Mental Status: He is alert and oriented to person, place, and time. Mental status is at baseline.         PERTINENT LABS/IMAGING:  Results for orders placed or performed during the hospital encounter of 05/18/23   XR Chest Port 1 View    Impression    IMPRESSION: Multi lead right subclavian venous pacemaker. Leads are intact and unchanged in  position.     There are new, patchy airspace opacities in the right lower lobe suggestive of an area of pneumonitis/early bronchopneumonia. Emphysematous changes are better appreciated on the CT.    No change in the cardiomegaly and mild pulmonary vascular congestion. No signs of failure.    CT Chest w/o Contrast    Impression    IMPRESSION:     1.  Unchanged bibasilar peripheral parenchymal scarring. No acute lung, airway, or pleural space abnormality.  2.  Biatrial and right ventricular heart enlargement. Dilated central pulmonary arteries. Findings are consistent with acute pulmonary hypertension. Calcification in the walls of the left atrium likely from prior ablation procedure.     Echocardiogram Complete   Result Value Ref Range    LVEF  55-60%    Echocardiogram Limited with Bubble Study   Result Value Ref Range    LVEF  55-60%        Recent Labs   Lab 05/29/23  0512 05/28/23  0433 05/27/23  1623 05/27/23  0452 05/26/23  0438 05/25/23  0444 05/24/23  0513 05/23/23 0438   WBC  --   --   --   --   --  10.9  --  9.0   HGB  --   --   --   --   --  12.2*  --  11.8*   MCV  --   --   --   --   --  99  --  101*   PLT  --   --   --   --   --  134*  --  139*   INR 2.96* 3.02*  --  3.32* 3.24* 2.70*   < > 2.44*   NA  --  145  --  145 144 144  --  143   POTASSIUM 3.7 3.6 3.7 3.4 3.5 3.7   < > 3.7   CHLORIDE  --  100  --  99 101 101  --  102   CO2  --  32*  --  34* 32* 34*  --  30*   BUN  --  47.1*  --  49.6* 51.7* 52.4*  --  53.9*   CR  --  1.44*  --  1.54* 1.41* 1.60*   < > 1.88*   ANIONGAP  --  13  --  12 11 9  --  11   AMBIKA  --  9.5  --  8.9 8.6* 8.8  --  8.9   GLC  --  119*  --  122* 122* 117*  --  147*    < > = values in this interval not displayed.     Recent Labs         25 MINUTES SPENT BY ME on the date of service doing chart review, history, exam, documentation, discussion with nursing staff and specialist, & further activities per the note.  Sanjuanita Granado MD  [facility]  Service  [phone]

## 2023-05-29 NOTE — PROGRESS NOTES
RESPIRATORY CARE NOTE    Pt is on high flow nasal cannula 7-8L throughout day, SpO2 remains in the low 90s. BS diminished, gave Atrovent and Xopenex treatment, BS post treatment slight increase aeration, patient perceives mild improvement, patient tolerated well.     Krysten Jimenez, RT

## 2023-05-29 NOTE — PROGRESS NOTES
..  Heart Failure Care Map  GOALS TO BE MET BEFORE DISCHARGE:    1. Decrease congestion and/or edema with diuretic therapy to achieve near optimal volume status.     Dyspnea improved: No, further care required to meet this goal. Please explain still needing 5 L of O2   Edema improved: Yes, satisfactory for discharge.        Last 24 hour I/O:   Intake/Output Summary (Last 24 hours) at 5/29/2023 1740  Last data filed at 5/29/2023 1000  Gross per 24 hour   Intake 477 ml   Output 1550 ml   Net -1073 ml           Net I/O and Weights since admission:   04/29 2300 - 05/29 2259  In: 79274 [P.O.:80101; I.V.:1109]  Out: 84930 [Urine:66998]  Net: -08887     Vitals:    05/18/23 0839 05/18/23 1815 05/19/23 0412 05/20/23 0348   Weight: 130.6 kg (288 lb) 122 kg (268 lb 15.4 oz) 123.5 kg (272 lb 4.3 oz) 121.9 kg (268 lb 11.9 oz)    05/21/23 0335 05/22/23 0616 05/23/23 0653 05/24/23 0341   Weight: 121.8 kg (268 lb 8.3 oz) 120.6 kg (265 lb 14 oz) 120.3 kg (265 lb 4.8 oz) 117 kg (257 lb 15 oz)    05/25/23 0358 05/25/23 1111 05/26/23 0345 05/27/23 0346   Weight: 117 kg (257 lb 15 oz) 116.7 kg (257 lb 3.2 oz) 116.6 kg (257 lb 0.9 oz) 112.8 kg (248 lb 10.9 oz)    05/27/23 1142 05/28/23 0327 05/28/23 1124 05/29/23 0656   Weight: 113.7 kg (250 lb 9.6 oz) 115.4 kg (254 lb 8 oz) 110.3 kg (243 lb 3.2 oz) 115.2 kg (254 lb)       2.  O2 sats > 90% on room air, or at prior home O2 therapy level.      Able to wean O2 this shift to keep sats above 90%?: No, further care required to meet this goal. Please explain needing nebulizer and O2   Does patient use Home O2? Yes-            Current oxygenation status:   SpO2: (!) 86 %     O2 Device: Nasal cannula, Oxygen Delivery: 5 LPM    3.  Tolerates ambulation and mobility near baseline.     Ambulation: No, further care required to meet this goal. Please explain declined ambulation in the hallway   Times patient ambulated with staff this shift: 0    Please review the Heart Failure Care Map for additional  HF goal outcomes.  Had issues with low BP Albumin gtt given Bp now 124/80 and will continue to monitor  Shraddha Michael RN  5/29/2023

## 2023-05-30 NOTE — PROGRESS NOTES
PALLIATIVE CARE PROGRESS NOTE  [facility]     Patient Name: Buck Sinclair  Date of Admission: 5/18/2023   Today the patient was seen for: [facility] follow-up      Recommendations & Counseling     Patient: Buck Sinclair  Date of Admission:  5/18/2023     Requesting Clinician / Team: Hospital medicine  Reason for consult: Goals of care      Recommendations & Counseling      GOALS OF CARE:     Life-prolonging currently without limits     Return home with home services     Would not want to be in long-term care    Salvador and family to discuss further his wishes and code status     Recommend OP palliative clinic follow-up for continued support and exploration of cares. (Referral placed last week)     ADVANCE CARE PLANNING:    No health care directive on file. Per [facility] informed consent policy next of kin should be involved if patient becomes unable.  Recommend completing health care directive, which could be done as outpatient    There is no POLST form on file    Code status: Full Code     MEDICAL MANAGEMENT:   #Dyspnea,Pulmonary Hypertension, HFrEF     Medical and diuretic management per primary team, cardiology.  Pulmonary signed off.      Oxygen, baseline 6-10 lpm.     Repositioning    Fan at bedside     #General Weakness/Debility     Physical Therapy    Occupational therapy    Therapy recommending TCU, however patient requesting to go home with home care services and support from wife.      PSYCHOSOCIAL/SPIRITUAL:    Family     Friends    [place] community: [employer]      Palliative Care will continue to follow. Thank you for the consult and allowing us to aid in the care of Buck Sinclair.         Rosita Holman, CNS  [facility], Palliative Care  Securely message with the Vocera Web Console (learn more here) or  Text page via [facility] Paging/Directory             Assessments             Buck Sinclair is a 78 year old male who has a past medical history significant for HFrEF, status  "post dual-chamber ICD, CAD, hypertension, persistent atrial fibrillation, chronic hypoxic respiratory failure most recently 10 L PNC, severe COPD who presented to the emergency department due to acute on chronic shortness of breath.  Patient has had progressively worsening dyspnea on exertion along with this paroxysmal nocturnal dyspnea and edema/fluid retention.  No chest pain.  No cough.  No abdominal pain.  No melena, hematochezia or hematemesis.  Patient was briefly seen in the pulmonology clinic by Dr. Payton this morning and upon patient's arrival he looked cyanotic, tachypneic and his SPO2 was in the 70s on 10 L/min.  Ambulance contacted right away and transported patient to the emergency department.  In the emergency department, patient was noted to be on respiratory distress and was therefore placed on BiPAP which has now been weaned down to 6 L nasal cannula.  Admitted for further evaluation and management  Prognosis, Goals, or Advance Care Planning was addressed today with: Yes.  Mood, coping, and/or meaning in the context of serious illness were addressed today: Yes.              Interval History:     Chart review/discussion with unit or clinical team members:   -Back on IV diuretics over weekend, changed to oral as of today.   -In past 24 hours patient has required 7-10 lpm oxygen.   -Should address code status.   -plans to follow up with pulmonary as outpatient (pulm has signed off).     Per patient or family/caregivers today:  No acute changes. Dyspnea stable. Feels close to baseline. Feeling comfortable.  Wanting to discharge home.  Frustrated that he is still in the hospital.  Wife still plans to care for him at home. Not feeling TCU would benefit him.  States she can \"get him to do a lot more\" at home.              Review of Systems:     Besides above, an additional  system ROS was reviewed and is unremarkable               Physical Exam:   Temp: 98.7  F (37.1  C) Temp src: Oral Temp  Min: 97.5  F " (36.4  C)  Max: 98.7  F (37.1  C) BP: 108/71 Pulse: 89   Resp: 20 SpO2: (!) 87 % O2 Device: Nasal cannula with humidification Oxygen Delivery: 10 LPM  Vital Signs with Ranges  Temp:  [97.5  F (36.4  C)-98.7  F (37.1  C)] 98.7  F (37.1  C)  Pulse:  [75-89] 89  Resp:  [20] 20  BP: ()/(57-80) 108/71  SpO2:  [82 %-95 %] 87 %  252 lbs 0 oz               Current Problem List:   Principal Problem:    Acute on chronic congestive heart failure, unspecified heart failure type (H)  Active Problems:    Cardiomyopathy (H)    Essential hypertension    Persistent Atrial Fibrillation    CKD (chronic kidney disease) stage 3, GFR 30-59 ml/min (H)    Acute on chronic respiratory failure with hypoxia (H)      Allergies   Allergen Reactions     Adhesive Tape Other (See Comments)     ADHESIVE TAPE; SKIN IRRITATION; Skin pulled off with foam tape       Amiodarone      ADVERSE REACTION.  Sunlight sensitivity.     Lisinopril             Data Reviewed:   Lab results reviewed.   ====================================================  TT: I have personally spent a total of 50 minutes on the date of the encounter,  on the unit in review of medical record, consultation with the medical providers and assessment of Buck Sinclair  today, with more than 50% of this time spent in counseling, coordination of care, and discussion re: diagnostic results, symptom management, risks and benefits of management options, and development of plan of care as noted above.      ====================================================

## 2023-05-30 NOTE — PROGRESS NOTES
[facility]    PROGRESS NOTE - Hospitalist Service    Assessment and Plan    Principal Problem:    Acute on chronic congestive heart failure, unspecified heart failure type (H)  Active Problems:    Cardiomyopathy (H)    Essential hypertension    Persistent Atrial Fibrillation    CKD (chronic kidney disease) stage 3, GFR 30-59 ml/min (H)    Acute on chronic respiratory failure with hypoxia (H)    Buck Sinclair is a 78 year old male with h/o hypertension, persistent atrial fibrillation, CAD, COPD, and CHF who presents to this ED by ambulance for evaluation of shortness of breath.       HFrEF with ADHF: deemed nonischemic dilated cardiomyopathy.    -TTE (9/13/2022) LVEF 55 to 60%, moderate TI, global RV function mild-moderately reduced, RSVP approximately 73 mmHg indicative of moderate pulmonary hypertension, estimated RA pressure 15 mmHg or greater.  -TTE (5/18/23) showed LVEF 55-60%, RV mod dilated, mod decreased RVS function, mod LAD, severe RAD, 2-3+ TR, severe PHTN, ~RAP >15mmHg  - Per cards continue bumex infusion stopped last ngiht and given albumin, holding oral Bumex and to start tomorrow.  - losartan on hold   - trend renal function  - potassium replacement  - continue telemetry     Acute on chronic hypoxic respiratory failure  Severe COPD   Moderate-to-severe pulmonary hypertension  -has been on oxygen chronically and for past several months was using 10L PNC?  -Acute decline due to volume overload in the setting of poor cardiopulmonary status chronically.  -required initially BiPAP on admission followed by HFNC and weaned to 8L on 5/22.  -Chest x-ray in emergency department showed new patchy airspace opacities in right lower lobe suggestive of area of pneumonitis/early proximal pneumonia.  Emphysematous changes. Cardiomegaly and mild pulmonary vascular congestion.    -CT Chest w/o contrast due to CHRISSY (5/19/23) showed Unchanged bibasilar peripheral parenchymal scarring. No acute  lung, airway, or pleural space abnormality. Biatrial and right ventricular heart enlargement. Dilated central pulmonary arteries.   -Repeat limited TTE with bubble study performed on 5/21/23 which was negative for interatrial shunt.  - hold Breo-Ellipta and Incruse Ellipta while on scheduled xopenex/atrovent nebs TID  -prednisone taper almost completed   -discussed with Pulm and agrees with diuresing, signed off nothing further for them to add and he has follow up in a couple of weeks  - bronchial hygiene   - VQ scan low probability for PE  - consider repeat sleep study despite 2015 unremarkable with elevated pulm HTN referral at discharge   - will need repeat home oxygen assessment in the morning if going home.      Possible CAP:   -Chest x-ray in emergency department showed new patchy airspace opacities in right lower lobe suggestive of area of pneumonitis/early proximal pneumonia.  Emphysematous changes. Cardiomegaly and mild pulmonary vascular congestion.    -CT Chest w/o contrast due to CHRISSY (5/19/23) showed Unchanged bibasilar peripheral parenchymal scarring. No acute lung, airway, or pleural space abnormality. Biatrial and right ventricular heart enlargement. Dilated central pulmonary arteries.   -Blood culture x2 ngtd, procalcitonin 0.01, respiratory panel PCR negative, MRSA nares negative.   -Empiric IV zosyn/Doxy stopped 5/23  -Augmentin/Doxy x 5 days from 5/23 completed course     Coronary artery disease: S/P CANDELARIA x3.  Coronary angiogram 1/24/2022 showed mild CAD with patent LAD stents in the proximal and mid LAD, left circumflex third obtuse marginal branch 30% stenosis, mid RCA lesion 20% stenosis.  LVEDP and PCWP normal.  PAP 66/24 mmHg with a mean pressure of 37 mmHg.  Shikha and TD cardiac outputs both 5.7 L/min.  -On warfarin, sotalol, statin  -start ASA 81mg qd  - No anginal complaints  - Troponin T-HS 31 ->30     Medtronic dual-chamber implantable cardiac defibrillator: -Indication:  primary  "prevention of sudden cardiac death.  -placed on 6/11/2014 with generator change on 10/28/2022  -device interrogation on 5/16/23 reviewed.      Persistent atrial fibrillation;  Supratherapeutic anticoagulation- resolved.  -per Dr. Garrett's last note, status post multiple electrical cardioversions, most recently on 11/19/2021. He underwent complex catheter ablation x2 in 2011. Overall fairly low burden of atrial arrhythmia based on his device interrogations. [id number] score is at least 5 (congestive heart failure, hypertension, age 75 or greater, coronary artery disease).  -warfarin, pharmacy to dose  --Sotalol dose reduced to 120mg daily     CHRISSY on CKD stage III: CHRISSY due to IV diuresis with net negative fluid loss of .  Admission Cr 1.64 carlo to peak of 1.96 (5/22) -> 1.88 today after IV diuresis was stopped on 5/22.  Baseline creatinine 1.5-1.6.   -hold Losartan until renal recovery near baseline then resume  - IV bumex continued and renal function stable   - trend      HTN:  -Sotalol dose reduced to 120mg q24hrs due to CrCl.  - bumex infusion now held and transition to oral tomorrow   - holding losartan      Oral Candidiasis:  -Nystatin Sw/Sw x 5 days completed      HLD:  -lipitor     Chronic macrocytic anemia, acute thrombocytopenia:  Platelets 139. Dale 123 (5/22/23) TSH normal.  Hgb stable at ~11. Baseline 10-11.  B12/Folate high. Normal ptt, fibrinogen, and D Dimer makes DIC less likely.      Hypokalemia:  - replace per protocol    Clinically Significant Risk Factors      # Overweight: Estimated body mass index is 28.31 kg/m  as calculated from the following:  Height as of this encounter: 1.626 m (5' 4\").  Weight as of this encounter: 74.8 kg (164 lb 14.4 oz)., PRESENT ON ADMISSION    COVID-19 PCR Results        11/18/2021    08:58 1/20/2022    14:41 2/17/2022    09:04 5/18/2023    16:05   COVID-19 PCR Results   SARS CoV2 PCR Negative   Negative   Negative   Negative     COVID-19 Antibody Results, Testing " for Immunity         No data to display               Code Status: Full Code  VTE prophylaxis:  Warfarin  DIET: Orders Placed This Encounter      Advance Diet as Tolerated: Regular Diet Adult; 2 gm NA Diet    Drains/Lines: none  Weight bearing status: WBAT     Expected Discharge Date: 05/31/2023    Discharge Delays: Oxygen Needs - Arrange Home O2  Lab Result Pending (enter specific test & time in comments)    Discharge Comments: possibly tomorrow pending cardiology further recs and home oxygen reassessment      Subjective:  breathing significantly improved.     PHYSICAL EXAM  Temp:  [97.5  F (36.4  C)-97.7  F (36.5  C)] 97.5  F (36.4  C)  Pulse:  [75-94] 79  Resp:  [20] 20  BP: ()/(57-80) 113/70  SpO2:  [82 %-95 %] 90 %  Wt Readings from Last 1 Encounters:   05/30/23 114.3 kg (252 lb)       Intake/Output Summary (Last 24 hours) at 5/24/2023 0831  Last data filed at 5/24/2023 0728  Gross per 24 hour   Intake 1425 ml   Output 3575 ml   Net -2150 ml      Body mass index is 31.5 kg/m .    Physical Exam  Constitutional:       Comments: Chronically ill-appearing, NAD,   HENT:      Head:      Comments: Decreased facial swelling  Cardiovascular:      Rate and Rhythm: Normal rate and regular rhythm.      Pulses: Normal pulses.   Pulmonary:      Effort: Pulmonary effort is normal.      Comments: CTA b/lno W/R/R  Abdominal:      General: Bowel sounds are normal.      Palpations: Abdomen is soft.   Musculoskeletal:         General: Normal range of motion.      Comments: Edema resolved    Skin:     General: Skin is warm and dry.      Capillary Refill: Capillary refill takes less than 2 seconds.   Neurological:      General: No focal deficit present.      Mental Status: He is alert and oriented to person, place, and time. Mental status is at baseline.         PERTINENT LABS/IMAGING:  Results for orders placed or performed during the hospital encounter of 05/18/23   XR Chest Port 1 View    Impression    IMPRESSION: Multi  lead right subclavian venous pacemaker. Leads are intact and unchanged in position.     There are new, patchy airspace opacities in the right lower lobe suggestive of an area of pneumonitis/early bronchopneumonia. Emphysematous changes are better appreciated on the CT.    No change in the cardiomegaly and mild pulmonary vascular congestion. No signs of failure.    CT Chest w/o Contrast    Impression    IMPRESSION:     1.  Unchanged bibasilar peripheral parenchymal scarring. No acute lung, airway, or pleural space abnormality.  2.  Biatrial and right ventricular heart enlargement. Dilated central pulmonary arteries. Findings are consistent with acute pulmonary hypertension. Calcification in the walls of the left atrium likely from prior ablation procedure.     Echocardiogram Complete   Result Value Ref Range    LVEF  55-60%    Echocardiogram Limited with Bubble Study   Result Value Ref Range    LVEF  55-60%        Recent Labs   Lab 05/30/23  0520 05/29/23  0512 05/28/23  0433 05/27/23  1623 05/27/23  0452 05/26/23  0438 05/25/23  0444   WBC  --   --   --   --   --   --  10.9   HGB  --   --   --   --   --   --  12.2*   MCV  --   --   --   --   --   --  99   PLT  --   --   --   --   --   --  134*   INR 2.91* 2.96* 3.02*  --  3.32*   < > 2.70*     --  145  --  145   < > 144   POTASSIUM 3.6 3.7 3.6   < > 3.4   < > 3.7   CHLORIDE 98  --  100  --  99   < > 101   CO2 31*  --  32*  --  34*   < > 34*   BUN 60.9*  --  47.1*  --  49.6*   < > 52.4*   CR 1.83*  --  1.44*  --  1.54*   < > 1.60*   ANIONGAP 14  --  13  --  12   < > 9   AMBIKA 9.6  --  9.5  --  8.9   < > 8.8   *  --  119*  --  122*   < > 117*    < > = values in this interval not displayed.     Recent Labs         25 MINUTES SPENT BY ME on the date of service doing chart review, history, exam, documentation, discussion with nursing staff and specialist, & further activities per the note.  Sanjuanita Granado MD  [employer]  Service  [phone]

## 2023-05-30 NOTE — PROGRESS NOTES
HEART CARE NOTE          Assessment/Recommendations     1. RV dysfunction/HFimpEF  Assessment / Plan  CHRISSY on am labs in the setting of hypotension/prerenal physiology - hold diuretics; patient nearing euvolemia- plan to transition to oral diuretics tomorrow     2. CAD  Assessment / Plan  S/p CANDELARIA x3; denies chest pain or anginal equivalent   Continue ASA unless otherwise contraindicated     3. Atrial fibrillation  Assessment / Plan  S/p ablation x2; s/p ICD  Currently on sotalol - reduced 2/2 prolonged QTc; Coumadin management per pharmacy     4. CHRISSY on CKD   Assessment / Plan  Improving with ongoing diuresis; Continue to monitor renal function closely      5. Moderate-severe Pulmonary HTN  Assessment / Plan  Primarily WHO group 3 - follows with Dr. Payton in pulmonary clinic and Dr. Morales; inpatient pulmonary team evaluated patient during this admission    History of Present Illness/Subjective      Mr. Buck Sinclair is a 78 year old male with a PMHx significant for (per Dr. Garrett's note)  HFrEF/RV dysfunction due to nonischemic cardiomyopathy (out of proportion to CAD) with LVEF most recently noted to be 50%, persistent AF s/p multiple electrical cardioversions and extensive catheter ablations x2 in 2011, CAD s/p CANDELARIA x3 to the [id number] LAD, COPD on home O2, Medtronic dual-chamber permanent pacemaker placement in 1999 replaced with a Medtronic dual-chamber implantable cardiac defibrillator on 6/11/2014, PHTN, HTN, and HLD presenting in acute hypoxic respiratory failure in the setting of ADHF     Today, Mr. Bone denies any acute cardiac events or complaints; Management plan as detailed above     ECG: Personally reviewed. Paced rhythm     ECHO (personnaly Reviewed):  Left ventricular size, wall motion and function are normal. The ejection  fraction is 55-60%.  Global right ventricular function is mildly to moderately reduced.  Moderate tricuspid insufficiency is present.  RVSP is estimated  "73mmHg.Moderate pulmonary hypertension is present.  IVC diameter >2.1 cm collapsing <50% with sniff suggests a high RA pressure  estimated at 15 mmHg or greater.     When compared with prior study of 12/08/2021, filling pressures/PASP are  higher now.          Physical Examination Review of Systems   BP 94/57 (BP Location: Right arm)   Pulse 75   Temp 97.7  F (36.5  C) (Oral)   Resp 20   Ht 1.905 m (6' 3\")   Wt 114.3 kg (252 lb)   SpO2 90%   BMI 31.50 kg/m    Body mass index is 31.5 kg/m .  Wt Readings from Last 3 Encounters:   05/30/23 114.3 kg (252 lb)   11/16/22 123.3 kg (271 lb 14.4 oz)   11/08/22 118.8 kg (262 lb)     General Appearance:   no distress, normal body habitus   ENT/Mouth: membranes moist, no oral lesions or bleeding gums.      EYES:  no scleral icterus, normal conjunctivae   Neck: no carotid bruits or thyromegaly   Chest/Lungs:   lungs are clear to auscultation, no rales or wheezing, equal chest wall expansion    Cardiovascular:   Regular. Normal first and second heart sounds with no murmurs, rubs, or gallops; the carotid, radial and posterior tibial pulses are intact, no JVD or LE edema bilaterally    Abdomen:  no organomegaly, masses, bruits, or tenderness; bowel sounds are present   Extremities: no cyanosis or clubbing   Skin: no xanthelasma, warm.    Neurologic: alert and oriented x3, calm     Psychiatric: alert and oriented x3, calm     A complete 10 systems ROS was reviewed  And is negative except what is listed in the HPI.          Medical History  Surgical History Family History Social History   Past Medical History:   Diagnosis Date     Anemia      Asthma without status asthmaticus 5/5/2021     BPH (benign prostatic hyperplasia)      Cardiomyopathy (H)      COPD, group B, by GOLD 2017 classification (H)      Coronary artery disease due to calcified coronary lesion      Dyslipidemia, goal LDL below 70      Essential hypertension      History of transfusion      Persistent atrial " fibrillation (H)      Pneumonia of left lower lobe due to infectious organism 10/4/2017     Skin cancer of trunk      Status post catheter ablation of atrial fibrillation 6/7/2017    PVI 4-2011 (Cryo/PVI + roof line + CTI line) Re-do PVI 7-2011 (RFA/PVI + CFE + VIDYA + confirmed CTI line)     Ventricular tachycardia (H)     Past Surgical History:   Procedure Laterality Date     CARDIAC DEFIBRILLATOR PLACEMENT       CARDIOVERSION  07/11/2018    x20, last 2/12/15, 10/2015, 11/18/16, 6/16/17 by Lauren Foster CNP     CARDIOVERSION  07/11/2018     CARDIOVERSION  11/19/2021     COLONOSCOPY N/A 4/28/2017    Procedure: COLONOSCOPY with 2 ascending polyps and 1 transverse polyp;  Surgeon: Jose Whittington MD;  Location: [facility];  Service:      CV CORONARY ANGIOGRAM N/A 9/20/2017    Procedure: Coronary Angiogram;  Surgeon: Sergio Cervantes MD;  Location: [facility] Cath Lab;  Service:      CV CORONARY ANGIOGRAM N/A 1/24/2022    Procedure: Coronary Angiogram;  Surgeon: Christi Saunders MD;  Location: [facility] CATH LAB CV     CV LEFT HEART CATH N/A 1/24/2022    Procedure: Left Heart Cath;  Surgeon: Christi Saunders MD;  Location: [facility] LAB CV     CV RIGHT AND LEFT HEART CATH N/A 1/24/2022    Procedure: Right and Left Heart Catherization;  Surgeon: Christi Saunders MD;  Location: [facility] CV     EP ICD GENERATOR REPLACEMENT DUAL N/A 10/28/2022    Procedure: Implantable Cardioverter Defibrillator Generator Replacement Dual;  Surgeon: Elo Collins MD;  Location: [facility] CATH LAB CV     EP ICD INSERT       FRACTURE SURGERY Left     wrist     INGUINAL HERNIA REPAIR Left 1967    while in the [employer] in [place] after 13 month in [place]     INSERT / REPLACE / REMOVE PACEMAKER       IR MISCELLANEOUS PROCEDURE  4/30/2014     OTHER SURGICAL HISTORY      left hand surgery---tendon repair     [place] ABLATE HEART DYSRHYTHM FOCUS  04/2011    Catheter Ablation Atrial Fibrillation PVI Apr 2011 (Cryo+RF-PVI + roof line +  CTI line)     [place] ABLATE HEART DYSRHYTHM FOCUS  2011    Re-do PVI 2011 (RFA-PVI + CFE + VIDYA + confirmation of CTI line)     TOTAL SHOULDER REPLACEMENT Right 2016    Dr. Abernathy of [facility]     WRIST SURGERY Left      ZZC MYERS W/O FACETEC FORAMOT/DSKC  VRT SEG, CERVICAL      Laminectomy Lumbar;  Recorded: 2012;    no family history of premature coronary artery disease Social History     Socioeconomic History     Marital status:      Spouse name: Not on file     Number of children: Not on file     Years of education: Not on file     Highest education level: Not on file   Occupational History     Not on file   Tobacco Use     Smoking status: Former     Packs/day: 1.00     Years: 4.00     Pack years: 4.00     Types: Cigarettes     Quit date: 1968     Years since quittin.4     Smokeless tobacco: Never   Vaping Use     Vaping status: Never Used   Substance and Sexual Activity     Alcohol use: Yes     Alcohol/week: 2.0 standard drinks of alcohol     Comment: Alcoholic Drinks/day: 1 beer per week     Drug use: No     Sexual activity: Yes     Partners: Female     Birth control/protection: Post-menopausal   Other Topics Concern     Parent/sibling w/ CABG, MI or angioplasty before 65F 55M? Not Asked   Social History Narrative    Preloaded 2013     Social Determinants of Health     Financial Resource Strain: Not on file   Food Insecurity: Not on file   Transportation Needs: Not on file   Physical Activity: Not on file   Stress: Not on file   Social Connections: Not on file   Intimate Partner Violence: Not on file   Housing Stability: Not on file           Lab Results    Chemistry/lipid CBC Cardiac Enzymes/BNP/TSH/INR   Lab Results   Component Value Date    CHOL 109 10/06/2022    HDL 58 10/06/2022    TRIG 39 10/06/2022    BUN 60.9 (H) 2023     2023    CO2 31 (H) 2023    Lab Results   Component Value Date    WBC 10.9 2023    HGB 12.2 (L)  05/25/2023    HCT 39.0 (L) 05/25/2023    MCV 99 05/25/2023     (L) 05/25/2023    Lab Results   Component Value Date     (H) 12/01/2020    TSH 2.38 05/18/2023    INR 2.91 (H) 05/30/2023     No results found for: CKTOTAL, CKMB, TROPONINI       Weight:    Wt Readings from Last 3 Encounters:   05/30/23 114.3 kg (252 lb)   11/16/22 123.3 kg (271 lb 14.4 oz)   11/08/22 118.8 kg (262 lb)       Allergies  Allergies   Allergen Reactions     Adhesive Tape Other (See Comments)     ADHESIVE TAPE; SKIN IRRITATION; Skin pulled off with foam tape       Amiodarone      ADVERSE REACTION.  Sunlight sensitivity.     Lisinopril          Surgical History  Past Surgical History:   Procedure Laterality Date     CARDIAC DEFIBRILLATOR PLACEMENT       CARDIOVERSION  07/11/2018    x20, last 2/12/15, 10/2015, 11/18/16, 6/16/17 by Lauren Foster CNP     CARDIOVERSION  07/11/2018     CARDIOVERSION  11/19/2021     COLONOSCOPY N/A 4/28/2017    Procedure: COLONOSCOPY with 2 ascending polyps and 1 transverse polyp;  Surgeon: Jose Whittington MD;  Location: [facility];  Service:      CV CORONARY ANGIOGRAM N/A 9/20/2017    Procedure: Coronary Angiogram;  Surgeon: Sergio Cervantes MD;  Location: [facility] Cath Lab;  Service:      CV CORONARY ANGIOGRAM N/A 1/24/2022    Procedure: Coronary Angiogram;  Surgeon: Christi Saunders MD;  Location: [facility] CV     CV LEFT HEART CATH N/A 1/24/2022    Procedure: Left Heart Cath;  Surgeon: Christi Saunders MD;  Location: [facility] CATH LAB CV     CV RIGHT AND LEFT HEART CATH N/A 1/24/2022    Procedure: Right and Left Heart Catherization;  Surgeon: Christi Saunders MD;  Location: [facility] CV     EP ICD GENERATOR REPLACEMENT DUAL N/A 10/28/2022    Procedure: Implantable Cardioverter Defibrillator Generator Replacement Dual;  Surgeon: Elo Collins MD;  Location: [facility] CV     EP ICD INSERT       FRACTURE SURGERY Left     wrist     INGUINAL HERNIA REPAIR Left 1967     while in the [employer] in [place] after 13 month in [place]     INSERT / REPLACE / REMOVE PACEMAKER       IR MISCELLANEOUS PROCEDURE  4/30/2014     OTHER SURGICAL HISTORY      left hand surgery---tendon repair     [place] ABLATE HEART DYSRHYTHM FOCUS  04/2011    Catheter Ablation Atrial Fibrillation PVI Apr 2011 (Cryo+RF-PVI + roof line + CTI line)     [place] ABLATE HEART DYSRHYTHM FOCUS  07/2011    Re-do PVI Jul 2011 (RFA-PVI + CFE + VIDYA + confirmation of CTI line)     TOTAL SHOULDER REPLACEMENT Right 03/03/2016    Dr. Abernathy of [facility]     WRIST SURGERY Left      ZZC MYERS W/O FACETEC FORAMOT/DSKC 1/2 VRT SEG, CERVICAL      Laminectomy Lumbar;  Recorded: 03/09/2012;       Social History  Tobacco:   History   Smoking Status     Former     Packs/day: 1.00     Years: 4.00     Types: Cigarettes     Quit date: 1/1/1968   Smokeless Tobacco     Never    Alcohol:   Social History    Substance and Sexual Activity      Alcohol use: Yes        Alcohol/week: 2.0 standard drinks of alcohol        Comment: Alcoholic Drinks/day: 1 beer per week   Illicit Drugs:   History   Drug Use No       Family History  Family History   Problem Relation Age of Onset     Cancer Mother         leukemia     Cancer Father         bladder     Cancer Sister         breast with lung met.     Aneurysm Sister      CABG Brother      CABG Brother      Valvular heart disease Brother         valve replacement          Deon Iglesias MD on 5/30/2023      cc: Vievk Guerrero

## 2023-05-30 NOTE — PROGRESS NOTES
RNCM chart reviewed per request, current PT/OT recommendations for TCU however patient on high amount of oxygen for TCU requirements.     CM will continue to follow care progression and aide in discharge planning as needed.     Maureen Todd RN

## 2023-05-30 NOTE — PLAN OF CARE
Heart Failure Care Map  GOALS TO BE MET BEFORE DISCHARGE:    1. Decrease congestion and/or edema with diuretic therapy to achieve near optimal volume status.     Dyspnea improved: No   Edema improved: Yes, satisfactory for discharge.        Last 24 hour I/O:   Intake/Output Summary (Last 24 hours) at 5/30/2023 0455  Last data filed at 5/30/2023 0347  Gross per 24 hour   Intake 797 ml   Output 2250 ml   Net -1453 ml           Net I/O and Weights since admission:   04/30 0700 - 05/30 0659  In: 85594 [P.O.:89975; I.V.:1309]  Out: 23882 [Urine:95216]  Net: -80501     Vitals:    05/18/23 0839 05/18/23 1815 05/19/23 0412 05/20/23 0348   Weight: 130.6 kg (288 lb) 122 kg (268 lb 15.4 oz) 123.5 kg (272 lb 4.3 oz) 121.9 kg (268 lb 11.9 oz)    05/21/23 0335 05/22/23 0616 05/23/23 0653 05/24/23 0341   Weight: 121.8 kg (268 lb 8.3 oz) 120.6 kg (265 lb 14 oz) 120.3 kg (265 lb 4.8 oz) 117 kg (257 lb 15 oz)    05/25/23 0358 05/25/23 1111 05/26/23 0345 05/27/23 0346   Weight: 117 kg (257 lb 15 oz) 116.7 kg (257 lb 3.2 oz) 116.6 kg (257 lb 0.9 oz) 112.8 kg (248 lb 10.9 oz)    05/27/23 1142 05/28/23 0327 05/28/23 1124 05/29/23 0656   Weight: 113.7 kg (250 lb 9.6 oz) 115.4 kg (254 lb 8 oz) 110.3 kg (243 lb 3.2 oz) 115.2 kg (254 lb)    05/30/23 0344   Weight: 114.3 kg (252 lb)       2.  O2 sats > 90% on room air, or at prior home O2 therapy level.      Able to wean O2 this shift to keep sats above 90%?: No   Does patient use Home O2? Yes-            Current oxygenation status:   SpO2: 90 %     O2 Device: Oxymask, Oxygen Delivery: 10 LPM    3.  Tolerates ambulation and mobility near baseline.     Ambulation: Not applicable this shift.   Times patient ambulated with staff this shift: 0        Lungs diminished to clear. On Bumex drip. Was desatting to 82% to 86% on 6L nasal cannula. Change to 10L oxymask due to patient being mouth breather when sleeping. Primofit in place. On 1.5 L fluid restriction. Weight trending down.    Please review  the Heart Failure Care Map for additional HF goal outcomes.    Mira Ellsworth, RN  5/30/2023

## 2023-05-30 NOTE — PROGRESS NOTES
CLINICAL NUTRITION SERVICES - REASSESSMENT NOTE     Nutrition Prescription    RECOMMENDATIONS FOR MDs/PROVIDERS TO ORDER:  None    Malnutrition Status:    Moderate on acute    Recommendations already ordered by Registered Dietitian (RD):  None    Future/Additional Recommendations:  Monitor oral intake and weight trends     EVALUATION OF THE PROGRESS TOWARD GOALS   Diet: 2 g Sodium and 1500 mL Fluid Restriction  Intake: % intake of meals noted in chart     NEW FINDINGS   Patient is ordering 3 small meals per day and occasionally will order ensure enlive with his meals. Daily intake of meals does not meet estimated nutrition needs.   Patient and wife reports that he has had a very poor appetite. He does not want any supplements scheduled at this time because it limits his fluid intake. I offered supplements that would not limit his fluid intake (like a special K bar and magicup) but he declined.    GI: last BM 5/27  Labs: Na 143, K 3.6, BUN 60.9(H), creat 1.83(H), Mg 2.2, glu 115  Meds reviewed.  Skin: trace edema noted  Weight trends: weight has overall decreased since admit  05/30/23 0344 114.3 kg (252 lb) Bed scale   05/29/23 0656 115.2 kg (254 lb) Bed scale   05/28/23 1124 110.3 kg (243 lb 3.2 oz) Standing scale   05/28/23 0327 115.4 kg (254 lb 8 oz) Bed scale   05/27/23 1142 113.7 kg (250 lb 9.6 oz) Standing scale   05/27/23 0346 112.8 kg (248 lb 10.9 oz) Bed scale   05/26/23 0345 116.6 kg (257 lb 0.9 oz) Bed scale   05/25/23 1111 116.7 kg (257 lb 3.2 oz) Standing scale   05/25/23 0358 117 kg (257 lb 15 oz) --   05/24/23 0341 117 kg (257 lb 15 oz) Bed scale   05/23/23 0653 120.3 kg (265 lb 4.8 oz) Standing scale   05/22/23 0616 120.6 kg (265 lb 14 oz) Bed scale   05/21/23 0335 121.8 kg (268 lb 8.3 oz) Bed scale   05/20/23 0348 121.9 kg (268 lb 11.9 oz) Bed scale   05/19/23 0412 123.5 kg (272 lb 4.3 oz) Bed scale   05/18/23 1815 122 kg (268 lb 15.4 oz) --   05/18/23 0839 130.6 kg (288 lb)      CURRENT  NUTRITION DIAGNOSIS  Inadequate oral intake related to acute illness as evidenced by pt wife report of pt having limited energy and low po intakes and pt not meeting nutritional needs. - progressing    Food- and nutrition-related knowledge deficit R/t 2 gm Na diet as evidence by need for nutrition education. - resolved    INTERVENTIONS  Implementation  None    Goals  Patient to consume % of nutritionally adequate meals three times per day, or the equivalent with supplements/snacks.- met  Patient will verbalize understanding of diet.  - Met during the education session    Monitoring/Evaluation  Monitor oral intake and weight trends    Cheryle Vaca RDN, LD

## 2023-05-30 NOTE — PROGRESS NOTES
"Pt continues on 6-10L nasal cannula.  Nebs given as ordered.  Increased aeration after.  Blood pressure 108/71, pulse 89, temperature 98.7  F (37.1  C), temperature source Oral, resp. rate 20, height 1.905 m (6' 3\"), weight 114.3 kg (252 lb), SpO2 (!) 87 %.    BRS: diminished with fine crackles in bilateral bases.    Encouraged d/b and cough.  "

## 2023-05-30 NOTE — PLAN OF CARE
Goal Outcome Evaluation:  A/Ox4. VSS. Denies pain. Tele a-fib. LS diminished. 10L High Flow NC. K protocol, AM recheck. Bed alarm on, call light in reach.       Heart Failure Care Map  GOALS TO BE MET BEFORE DISCHARGE:    1. Decrease congestion and/or edema with diuretic therapy to achieve near optimal volume status.     Dyspnea improved: No, further care required to meet this goal. Please explain SOB at rest   Edema improved: No, further care required to meet this goal. Please explain +1 edema on legs and feet        Last 24 hour I/O:   Intake/Output Summary (Last 24 hours) at 5/30/2023 1854  Last data filed at 5/30/2023 1646  Gross per 24 hour   Intake 537.2 ml   Output 2750 ml   Net -2212.8 ml           Net I/O and Weights since admission:   04/30 2300 - 05/30 2259  In: 09190.2 [P.O.:54743; I.V.:1339.2]  Out: 92886 [Urine:98377]  Net: -64407.8     Vitals:    05/18/23 0839 05/18/23 1815 05/19/23 0412 05/20/23 0348   Weight: 130.6 kg (288 lb) 122 kg (268 lb 15.4 oz) 123.5 kg (272 lb 4.3 oz) 121.9 kg (268 lb 11.9 oz)    05/21/23 0335 05/22/23 0616 05/23/23 0653 05/24/23 0341   Weight: 121.8 kg (268 lb 8.3 oz) 120.6 kg (265 lb 14 oz) 120.3 kg (265 lb 4.8 oz) 117 kg (257 lb 15 oz)    05/25/23 0358 05/25/23 1111 05/26/23 0345 05/27/23 0346   Weight: 117 kg (257 lb 15 oz) 116.7 kg (257 lb 3.2 oz) 116.6 kg (257 lb 0.9 oz) 112.8 kg (248 lb 10.9 oz)    05/27/23 1142 05/28/23 0327 05/28/23 1124 05/29/23 0656   Weight: 113.7 kg (250 lb 9.6 oz) 115.4 kg (254 lb 8 oz) 110.3 kg (243 lb 3.2 oz) 115.2 kg (254 lb)    05/30/23 0344   Weight: 114.3 kg (252 lb)       2.  O2 sats > 90% on room air, or at prior home O2 therapy level.      Able to wean O2 this shift to keep sats above 90%?: No, further care required to meet this goal. Please explain Pt still on high flow NC   Does patient use Home O2? No          Current oxygenation status:   SpO2:88%   O2 Device: Nasal cannula with humidification, Oxygen Delivery: 10 LPM    3.   Tolerates ambulation and mobility near baseline.     Ambulation: No, further care required to meet this goal. Please explain Pt very weak and SOB  with activity   Times patient ambulated with staff this shift: 1    Please review the Heart Failure Care Map for additional HF goal outcomes.    Pearl Hassan RN  5/30/2023

## 2023-05-30 NOTE — PLAN OF CARE
Problem: Gas Exchange Impaired  Goal: Optimal Gas Exchange  Outcome: Not Progressing     Problem: COPD (Chronic Obstructive Pulmonary Disease) Comorbidity  Goal: Maintenance of COPD Symptom Control  Outcome: Not Progressing  Intervention: Maintain COPD-Symptom Control  Recent Flowsheet Documentation  Taken 5/30/2023 1400 by Yaritza Rivera RN  Medication Review/Management: medications reviewed  Taken 5/30/2023 0900 by Yaritza Rivera RN  Medication Review/Management: medications reviewed     Problem: Heart Failure  Goal: Stable Heart Rate and Rhythm  Outcome: Progressing     Problem: Hypertension Comorbidity  Goal: Blood Pressure in Desired Range  Outcome: Progressing  Intervention: Maintain Blood Pressure Management  Recent Flowsheet Documentation  Taken 5/30/2023 1400 by Yaritza Rivera RN  Medication Review/Management: medications reviewed  Taken 5/30/2023 0900 by Yaritza Rivera RN  Medication Review/Management: medications reviewed     Problem: Plan of Care - These are the overarching goals to be used throughout the patient stay.    Goal: Absence of Hospital-Acquired Illness or Injury  Intervention: Identify and Manage Fall Risk  Recent Flowsheet Documentation  Taken 5/30/2023 1400 by Yaritza Rivera RN  Safety Promotion/Fall Prevention: activity supervised  Taken 5/30/2023 0900 by Yaritza Rivera RN  Safety Promotion/Fall Prevention: activity supervised  Goal: Optimal Comfort and Wellbeing  Intervention: Provide Person-Centered Care  Recent Flowsheet Documentation  Taken 5/30/2023 1400 by Yaritza Rivera RN  Trust Relationship/Rapport: care explained  Taken 5/30/2023 0900 by Yaritza Rivera RN  Trust Relationship/Rapport: care explained     Problem: Risk for Delirium  Goal: Improved Behavioral Control  Intervention: Minimize Safety Risk  Recent Flowsheet Documentation  Taken 5/30/2023 1400 by Yaritza Rivera RN  Enhanced Safety Measures: room near unit  station  Trust Relationship/Rapport: care explained  Taken 5/30/2023 0900 by Yaritza Rviera RN  Enhanced Safety Measures: room near unit station  Trust Relationship/Rapport: care explained  Goal: Improved Attention and Thought Clarity  Intervention: Maximize Cognitive Function  Recent Flowsheet Documentation  Taken 5/30/2023 1400 by Yaritza Rivera RN  Sensory Stimulation Regulation: care clustered  Reorientation Measures: clock in view  Taken 5/30/2023 0900 by Yaritza Rivera RN  Sensory Stimulation Regulation: care clustered  Reorientation Measures: clock in view     Problem: Heart Failure  Goal: Optimal Functional Ability  Intervention: Optimize Functional Ability  Recent Flowsheet Documentation  Taken 5/30/2023 1400 by Yaritza Rivera RN  Activity Management: activity adjusted per tolerance  Taken 5/30/2023 0900 by Yaritza Rivera RN  Activity Management: activity adjusted per tolerance  Goal: Effective Oxygenation and Ventilation  Intervention: Promote Airway Secretion Clearance  Recent Flowsheet Documentation  Taken 5/30/2023 1400 by Yaritza Rivera RN  Activity Management: activity adjusted per tolerance  Taken 5/30/2023 0900 by Yaritza Rivera RN  Activity Management: activity adjusted per tolerance  Goal: Effective Breathing Pattern During Sleep  Intervention: Monitor and Manage Obstructive Sleep Apnea  Recent Flowsheet Documentation  Taken 5/30/2023 1400 by Yaritza Rivera RN  Medication Review/Management: medications reviewed  Taken 5/30/2023 0900 by Yaritza Rivera RN  Medication Review/Management: medications reviewed   Goal Outcome Evaluation:    Patient is alert and able to let his needs be known. Denies any pain when asked. Patient reports some shortness of breath at rest, on 10L O2 NC. When working with therapy, oxygen saturation drops low 80s and takes a while for him to recover. Cardiology following, Ordered albumin IV and Bumex was changed to oral  2mg. Potassium protocol, recheck in morning. Continue plan of care.

## 2023-05-31 NOTE — PLAN OF CARE
Goal Outcome Evaluation:       Pt still on Hi-Flow O2, lungs diminished. Had 400cc urine output during the night. Pt slept well in between care.

## 2023-05-31 NOTE — PROGRESS NOTES
[facility]    PROGRESS NOTE - Hospitalist Service    Assessment and Plan  Patient is new to me,Buck Sinclair is a 78 year old male with h/o hypertension, persistent atrial fibrillation, CAD, COPD, and CHF who presents to this ED by ambulance for evaluation of shortness of breath.       HFrEF with ADHF: deemed nonischemic dilated cardiomyopathy.    -TTE (9/13/2022) LVEF 55 to 60%, moderate TI, global RV function mild-moderately reduced, RSVP approximately 73 mmHg indicative of moderate pulmonary hypertension, estimated RA pressure 15 mmHg or greater.  -TTE (5/18/23) showed LVEF 55-60%, RV mod dilated, mod decreased RVS function, mod LAD, severe RAD, 2-3+ TR, severe PHTN, ~RAP >15mmHg  - Per cards continue bumex infusion stopped last ngiht and given albumin, holding oral Bumex initially and to start today.  -Continue losartan on hold   - trend renal function  - potassium replacement  - continue telemetry      Acute on chronic hypoxic respiratory failure  Severe COPD   Moderate-to-severe pulmonary hypertension  -has been on oxygen chronically and for past several months was using 10L PNC?  -Acute decline due to volume overload in the setting of poor cardiopulmonary status chronically.  -required initially BiPAP on admission followed by HFNC and weaned to 8L on 5/22.  -Chest x-ray in emergency department showed new patchy airspace opacities in right lower lobe suggestive of area of pneumonitis/early proximal pneumonia.  Emphysematous changes. Cardiomegaly and mild pulmonary vascular congestion.    -CT Chest w/o contrast due to CHRISSY (5/19/23) showed Unchanged bibasilar peripheral parenchymal scarring. No acute lung, airway, or pleural space abnormality. Biatrial and right ventricular heart enlargement. Dilated central pulmonary arteries.   -Repeat limited TTE with bubble study performed on 5/21/23 which was negative for interatrial shunt.  - hold Breo-Ellipta and Incruse Ellipta while on  scheduled xopenex/atrovent nebs TID  -prednisone taper almost completed   -discussed with Pulm and agrees with diuresing, signed off nothing further for them to add and he has follow up in a couple of weeks  - bronchial hygiene   - VQ scan low probability for PE  - consider repeat sleep study despite 2015 unremarkable with elevated pulm HTN referral at discharge   - will need repeat home oxygen assessment in the morning if going home.      Possible CAP:   -Chest x-ray in emergency department showed new patchy airspace opacities in right lower lobe suggestive of area of pneumonitis/early proximal pneumonia.  Emphysematous changes. Cardiomegaly and mild pulmonary vascular congestion.    -CT Chest w/o contrast due to CHRISSY (5/19/23) showed Unchanged bibasilar peripheral parenchymal scarring. No acute lung, airway, or pleural space abnormality. Biatrial and right ventricular heart enlargement. Dilated central pulmonary arteries.   -Blood culture x2 ngtd, procalcitonin 0.01, respiratory panel PCR negative, MRSA nares negative.   - Empiric IV zosyn/Doxy stopped 5/23  - Augmentin/Doxy x 5 days from 5/23 completed course     Coronary artery disease:   -S/P CANDELARIA x3.  Coronary angiogram 1/24/2022 showed mild CAD with patent LAD stents in the proximal and mid LAD, left circumflex third obtuse marginal branch 30% stenosis, mid RCA lesion 20% stenosis.  LVEDP and PCWP normal.  PAP 66/24 mmHg with a mean pressure of 37 mmHg.  Shikha and TD cardiac outputs both 5.7 L/min.  -On warfarin, sotalol, statin  -start ASA 81mg qd  - No anginal complaints  - Troponin T-HS 31 ->30     Medtronic dual-chamber implantable cardiac defibrillator: -Indication:  primary prevention of sudden cardiac death.  -placed on 6/11/2014 with generator change on 10/28/2022  -device interrogation on 5/16/23 reviewed.       Persistent atrial fibrillation;  Supratherapeutic anticoagulation- resolved.  -per Dr. Garrett's last note, status post multiple electrical  cardioversions, most recently on 11/19/2021. He underwent complex catheter ablation x2 in 2011. Overall fairly low burden of atrial arrhythmia based on his device interrogations. [id number] score is at least 5 (congestive heart failure, hypertension, age 75 or greater, coronary artery disease).  -warfarin, pharmacy to dose  --Sotalol dose reduced to 120mg daily     CHRISSY on CKD stage III: CHRISSY due to IV diuresis with net negative fluid loss of .  Admission Cr 1.64 carlo to peak of 1.96 (5/22) -> 1.88 today after IV diuresis was stopped on 5/22.  Baseline creatinine 1.5-1.6.   -hold Losartan until renal recovery near baseline then resume  - IV bumex continued and renal function stable   - trend      HTN:  -Sotalol dose reduced to 120mg q24hrs due to CrCl.  - bumex infusion now held and transition to oral tomorrow   - holding losartan      Oral Candidiasis:  -Nystatin Sw/Sw x 5 days completed      HLD:  -lipitor     Chronic macrocytic anemia, acute thrombocytopenia:  Platelets 139. Dale 123 (5/22/23) TSH normal.  Hgb stable at ~11. Baseline 10-11.  B12/Folate high. Normal ptt, fibrinogen, and D Dimer makes DIC less likely.      Hypokalemia:  - replace per protocol    Weakness and deconditioning  - PT/OT evaluation  -Patient is CCU     50 MINUTES SPENT BY ME on the date of service doing chart review, history, exam, documentation & further activities per the note    Principal Problem:    Acute on chronic congestive heart failure, unspecified heart failure type (H)  Active Problems:    Cardiomyopathy (H)    Essential hypertension    Persistent Atrial Fibrillation    CKD (chronic kidney disease) stage 3, GFR 30-59 ml/min (H)    Acute on chronic respiratory failure with hypoxia (H)      VTE prophylaxis:  Warfarin  DIET: Orders Placed This Encounter      Advance Diet as Tolerated: Regular Diet Adult; 2 gm NA Diet      Disposition/Barriers to discharge: Monitor renal function, worsening hypoxia  Code Status: Full  Code    Subjective:  Herbie is feeling about the same today, still has shortness of breath.  Denies chest pain.    PHYSICAL EXAM  Vitals:    05/29/23 0656 05/30/23 0344 05/31/23 0403   Weight: 115.2 kg (254 lb) 114.3 kg (252 lb) 112.1 kg (247 lb 2.2 oz)     B/P:120/76 T:97.8 P:76 R:20     Intake/Output Summary (Last 24 hours) at 5/31/2023 1409  Last data filed at 5/31/2023 1239  Gross per 24 hour   Intake 937 ml   Output 1650 ml   Net -713 ml      Body mass index is 30.89 kg/m .    Constitutional: awake, alert, cooperative, no apparent distress, and appears stated age  Respiratory: Decreased breath sounds on both sides with poor air air movement, scattered rhonchi.  No wheeze.    Cardiovascular: Normal apical impulse, regular rate and rhythm, normal S1 and S2, no S3 or S4, and no murmur noted  GI: No scars, normal bowel sounds, soft, non-distended, non-tender, no masses palpated, no hepatosplenomegally  Skin: no bruising or bleeding and normal skin color, texture, turgor  Musculoskeletal: There is no redness, warmth, or swelling of the joints.  Full range of motion noted.  no lower extremity pitting edema present  Neurologic: Awake, alert, oriented to name, place and time.  Cranial nerves II-XII are grossly intact.  Motor is 5 out of 5 bilaterally.   Sensory is intact.    Neuropsychiatric: Appropriate with examiner      PERTINENT LABS/IMAGING:    I have personally reviewed the following data over the past 24 hrs:    N/A  \   N/A   / N/A     144 98 65.6 (H) /  120 (H)   3.5 32 (H) 1.81 (H) \       INR:  2.66 (H) PTT:  N/A   D-dimer:  N/A Fibrinogen:  N/A       Imaging results reviewed over the past 24 hrs:   No results found for this or any previous visit (from the past 24 hour(s)).    Discussed with patient, family, pulmonology, nursing staff and discharge planner    Roel York MD  [facility] Medicine Service  [phone]

## 2023-05-31 NOTE — PROGRESS NOTES
"Pt continues on 6lnc since midday.  Pt received nebs as ordered.  [facility] called and states pt has order for 4lnc at rest and 6lnc with activity.  Pt states he was using up to 10l with activity.  Pt and I discussed oxygen and keeping his spo2 88-92%.  All questions answered regarding how too much O2 can be bad for people with COPD.  Blood pressure 103/72, pulse 67, temperature 98.3  F (36.8  C), temperature source Oral, resp. rate 16, height 1.905 m (6' 3\"), weight 112.1 kg (247 lb 2.2 oz), SpO2 96 %.    BRS:  Diminished.  "

## 2023-05-31 NOTE — PLAN OF CARE
Problem: Heart Failure  Goal: Stable Heart Rate and Rhythm  Outcome: Progressing  Goal: Effective Oxygenation and Ventilation  Outcome: Progressing  Intervention: Promote Airway Secretion Clearance  Recent Flowsheet Documentation  Taken 5/31/2023 0900 by Yaritza Rivera RN  Cough And Deep Breathing: done independently per patient  Activity Management: activity adjusted per tolerance     Problem: Gas Exchange Impaired  Goal: Optimal Gas Exchange  Outcome: Progressing     Problem: Hypertension Comorbidity  Goal: Blood Pressure in Desired Range  Outcome: Progressing  Intervention: Maintain Blood Pressure Management  Recent Flowsheet Documentation  Taken 5/31/2023 0900 by Yaritza Rivera RN  Medication Review/Management: medications reviewed     Problem: COPD (Chronic Obstructive Pulmonary Disease) Comorbidity  Goal: Maintenance of COPD Symptom Control  Outcome: Progressing  Intervention: Maintain COPD-Symptom Control  Recent Flowsheet Documentation  Taken 5/31/2023 0900 by Yaritza Rivera RN  Medication Review/Management: medications reviewed     Problem: Plan of Care - These are the overarching goals to be used throughout the patient stay.    Goal: Absence of Hospital-Acquired Illness or Injury  Intervention: Identify and Manage Fall Risk  Recent Flowsheet Documentation  Taken 5/31/2023 0900 by Yaritza Rivera RN  Safety Promotion/Fall Prevention:    activity supervised    assistive device/personal items within reach  Goal: Optimal Comfort and Wellbeing  Intervention: Provide Person-Centered Care  Recent Flowsheet Documentation  Taken 5/31/2023 0900 by Yaritza Rivera RN  Trust Relationship/Rapport: care explained     Problem: Risk for Delirium  Goal: Improved Behavioral Control  Intervention: Minimize Safety Risk  Recent Flowsheet Documentation  Taken 5/31/2023 0900 by Yaritza Rivera RN  Enhanced Safety Measures:  at bedside  Trust Relationship/Rapport: care  explained  Goal: Improved Attention and Thought Clarity  Intervention: Maximize Cognitive Function  Recent Flowsheet Documentation  Taken 5/31/2023 0900 by Yaritza Rivera RN  Sensory Stimulation Regulation: care clustered  Reorientation Measures: clock in view     Problem: Heart Failure  Goal: Optimal Functional Ability  Intervention: Optimize Functional Ability  Recent Flowsheet Documentation  Taken 5/31/2023 0900 by Yaritza Rivera RN  Activity Management: activity adjusted per tolerance  Goal: Effective Breathing Pattern During Sleep  Intervention: Monitor and Manage Obstructive Sleep Apnea  Recent Flowsheet Documentation  Taken 5/31/2023 0900 by Yaritza Rivera RN  Medication Review/Management: medications reviewed   Goal Outcome Evaluation:       Patient is alert and able to le this needs be known. Denies any pain when asked. VSS and AFIB on the monitor. Has ICD, occasional spikes noted. Potassium protocol in place, recheck in morning. Patient changed to O2 NC at 6L at around 1100. Tolerating this well at rest. Patient does drop 80s with exertion/activity with therapy. Cardiology following- Oral diuretics ordered as well as fluid restriction. 1500ml FR in place and patient needs frequent reminders and education on rationale for this restriction. Fluids this shift so far 837ml. Writer did remind he will only have 663ml fluid left until midnight. Patient understands this and gets upset when discussing fluid restriction. Wife at bedside. Continue plan of care.

## 2023-05-31 NOTE — PROGRESS NOTES
HEART CARE NOTE          Assessment/Recommendations     1. RV dysfunction/HFimpEF  Assessment / Plan  Renal function stable - tolerating oral diuretics; no changes to regimen at this time     2. CAD  Assessment / Plan  S/p CANDELARIA x3; denies chest pain or anginal equivalent   Continue ASA unless otherwise contraindicated     3. Atrial fibrillation  Assessment / Plan  S/p ablation x2; s/p ICD  Currently on sotalol - reduced 2/2 prolonged QTc; Coumadin management per pharmacy     4. CHRISSY on CKD   Assessment / Plan  Improving with ongoing diuresis; Continue to monitor renal function closely      5. Moderate-severe Pulmonary HTN  Assessment / Plan  Primarily WHO group 3 - follows with Dr. Payton in pulmonary clinic and Dr. Morales; inpatient pulmonary team evaluated patient during this admission    Cardiology team will sign-off for now. Please do not hesitate to consult us again if new questions or concerns arise. Follow-up appointment will be arranged by CORE/HF clinic.     History of Present Illness/Subjective      Mr. Buck Sinclair is a 78 year old male with a PMHx significant for (per Dr. Garrett's note)  HFrEF/RV dysfunction due to nonischemic cardiomyopathy (out of proportion to CAD) with LVEF most recently noted to be 50%, persistent AF s/p multiple electrical cardioversions and extensive catheter ablations x2 in 2011, CAD s/p CANDELARIA x3 to the lgzmyvkf-al-fqtzaf LAD, COPD on home O2, Medtronic dual-chamber permanent pacemaker placement in 1999 replaced with a Medtronic dual-chamber implantable cardiac defibrillator on 6/11/2014, PHTN, HTN, and HLD presenting in acute hypoxic respiratory failure in the setting of ADHF     Today, Mr. Bone denies any acute cardiac events or complaints; Management plan as detailed above     ECG: Personally reviewed. Paced rhythm     ECHO (personnaly Reviewed):  Left ventricular size, wall motion and function are normal. The ejection  fraction is 55-60%.  Global right ventricular  "function is mildly to moderately reduced.  Moderate tricuspid insufficiency is present.  RVSP is estimated 73mmHg.Moderate pulmonary hypertension is present.  IVC diameter >2.1 cm collapsing <50% with sniff suggests a high RA pressure  estimated at 15 mmHg or greater.     When compared with prior study of 12/08/2021, filling pressures/PASP are  higher now.          Physical Examination Review of Systems   /70 (BP Location: Right arm)   Pulse 69   Temp 98.9  F (37.2  C) (Oral)   Resp 20   Ht 1.905 m (6' 3\")   Wt 112.1 kg (247 lb 2.2 oz)   SpO2 99%   BMI 30.89 kg/m    Body mass index is 30.89 kg/m .  Wt Readings from Last 3 Encounters:   05/31/23 112.1 kg (247 lb 2.2 oz)   11/16/22 123.3 kg (271 lb 14.4 oz)   11/08/22 118.8 kg (262 lb)     General Appearance:   no distress, normal body habitus   ENT/Mouth: membranes moist, no oral lesions or bleeding gums.      EYES:  no scleral icterus, normal conjunctivae   Neck: no carotid bruits or thyromegaly   Chest/Lungs:   lungs are clear to auscultation, no rales or wheezing, equal chest wall expansion    Cardiovascular:   Regular. Normal first and second heart sounds with no murmurs, rubs, or gallops; the carotid, radial and posterior tibial pulses are intact, no JVD or LE edema bilaterally    Abdomen:  no organomegaly, masses, bruits, or tenderness; bowel sounds are present   Extremities: no cyanosis or clubbing   Skin: no xanthelasma, warm.    Neurologic: alert and oriented x3, calm     Psychiatric: alert and oriented x3, calm     A complete 10 systems ROS was reviewed  And is negative except what is listed in the HPI.          Medical History  Surgical History Family History Social History   Past Medical History:   Diagnosis Date     Anemia      Asthma without status asthmaticus 5/5/2021     BPH (benign prostatic hyperplasia)      Cardiomyopathy (H)      COPD, group B, by GOLD 2017 classification (H)      Coronary artery disease due to calcified coronary " lesion      Dyslipidemia, goal LDL below 70      Essential hypertension      History of transfusion      Persistent atrial fibrillation (H)      Pneumonia of left lower lobe due to infectious organism 10/4/2017     Skin cancer of trunk      Status post catheter ablation of atrial fibrillation 6/7/2017    PVI 4-2011 (Cryo/PVI + roof line + CTI line) Re-do PVI 7-2011 (RFA/PVI + CFE + VIDYA + confirmed CTI line)     Ventricular tachycardia (H)     Past Surgical History:   Procedure Laterality Date     CARDIAC DEFIBRILLATOR PLACEMENT       CARDIOVERSION  07/11/2018    x20, last 2/12/15, 10/2015, 11/18/16, 6/16/17 by Lauren Foster CNP     CARDIOVERSION  07/11/2018     CARDIOVERSION  11/19/2021     COLONOSCOPY N/A 4/28/2017    Procedure: COLONOSCOPY with 2 ascending polyps and 1 transverse polyp;  Surgeon: Jose Whittington MD;  Location: [facility];  Service:      CV CORONARY ANGIOGRAM N/A 9/20/2017    Procedure: Coronary Angiogram;  Surgeon: Sergio Cervantes MD;  Location: [facility] Cath Lab;  Service:      CV CORONARY ANGIOGRAM N/A 1/24/2022    Procedure: Coronary Angiogram;  Surgeon: Christi Saunders MD;  Location: [facility] CATH LAB CV     CV LEFT HEART CATH N/A 1/24/2022    Procedure: Left Heart Cath;  Surgeon: Christi Saunders MD;  Location: [facility] LAB CV     CV RIGHT AND LEFT HEART CATH N/A 1/24/2022    Procedure: Right and Left Heart Catherization;  Surgeon: Christi Saunders MD;  Location: [facility] CV     EP ICD GENERATOR REPLACEMENT DUAL N/A 10/28/2022    Procedure: Implantable Cardioverter Defibrillator Generator Replacement Dual;  Surgeon: Elo Collins MD;  Location: [facility] CATH LAB CV     EP ICD INSERT       FRACTURE SURGERY Left     wrist     INGUINAL HERNIA REPAIR Left 1967    while in the [employer] in [place] after 13 month in [place]     INSERT / REPLACE / REMOVE PACEMAKER       IR MISCELLANEOUS PROCEDURE  4/30/2014     OTHER SURGICAL HISTORY      left hand surgery---tendon repair      NE ABLATE HEART DYSRHYTHM FOCUS  2011    Catheter Ablation Atrial Fibrillation PVI 2011 (Cryo+RF-PVI + roof line + CTI line)     NE ABLATE HEART DYSRHYTHM FOCUS  2011    Re-do PVI 2011 (RFA-PVI + CFE + VIDYA + confirmation of CTI line)     TOTAL SHOULDER REPLACEMENT Right 2016    Dr. Abernathy of [facility]     WRIST SURGERY Left      ZZC MYERS W/O FACETEC FORAMOT/DSKC  VRT SEG, CERVICAL      Laminectomy Lumbar;  Recorded: 2012;    no family history of premature coronary artery disease Social History     Socioeconomic History     Marital status:      Spouse name: Not on file     Number of children: Not on file     Years of education: Not on file     Highest education level: Not on file   Occupational History     Not on file   Tobacco Use     Smoking status: Former     Packs/day: 1.00     Years: 4.00     Pack years: 4.00     Types: Cigarettes     Quit date: 1968     Years since quittin.4     Smokeless tobacco: Never   Vaping Use     Vaping status: Never Used   Substance and Sexual Activity     Alcohol use: Yes     Alcohol/week: 2.0 standard drinks of alcohol     Comment: Alcoholic Drinks/day: 1 beer per week     Drug use: No     Sexual activity: Yes     Partners: Female     Birth control/protection: Post-menopausal   Other Topics Concern     Parent/sibling w/ CABG, MI or angioplasty before 65F 55M? Not Asked   Social History Narrative    Preloaded 2013     Social Determinants of Health     Financial Resource Strain: Not on file   Food Insecurity: Not on file   Transportation Needs: Not on file   Physical Activity: Not on file   Stress: Not on file   Social Connections: Not on file   Intimate Partner Violence: Not on file   Housing Stability: Not on file           Lab Results    Chemistry/lipid CBC Cardiac Enzymes/BNP/TSH/INR   Lab Results   Component Value Date    CHOL 109 10/06/2022    HDL 58 10/06/2022    TRIG 39 10/06/2022    BUN 60.9 (H) 2023    NA  143 05/30/2023    CO2 31 (H) 05/30/2023    Lab Results   Component Value Date    WBC 10.9 05/25/2023    HGB 12.2 (L) 05/25/2023    HCT 39.0 (L) 05/25/2023    MCV 99 05/25/2023     (L) 05/25/2023    Lab Results   Component Value Date     (H) 12/01/2020    TSH 2.38 05/18/2023    INR 2.91 (H) 05/30/2023     No results found for: CKTOTAL, CKMB, TROPONINI       Weight:    Wt Readings from Last 3 Encounters:   05/31/23 112.1 kg (247 lb 2.2 oz)   11/16/22 123.3 kg (271 lb 14.4 oz)   11/08/22 118.8 kg (262 lb)       Allergies  Allergies   Allergen Reactions     Adhesive Tape Other (See Comments)     ADHESIVE TAPE; SKIN IRRITATION; Skin pulled off with foam tape       Amiodarone      ADVERSE REACTION.  Sunlight sensitivity.     Lisinopril          Surgical History  Past Surgical History:   Procedure Laterality Date     CARDIAC DEFIBRILLATOR PLACEMENT       CARDIOVERSION  07/11/2018    x20, last 2/12/15, 10/2015, 11/18/16, 6/16/17 by Lauren Foster CNP     CARDIOVERSION  07/11/2018     CARDIOVERSION  11/19/2021     COLONOSCOPY N/A 4/28/2017    Procedure: COLONOSCOPY with 2 ascending polyps and 1 transverse polyp;  Surgeon: Jose Whittington MD;  Location: [facility];  Service:      CV CORONARY ANGIOGRAM N/A 9/20/2017    Procedure: Coronary Angiogram;  Surgeon: Sergio Cervantes MD;  Location: [facility] Cath Lab;  Service:      CV CORONARY ANGIOGRAM N/A 1/24/2022    Procedure: Coronary Angiogram;  Surgeon: Christi Saunders MD;  Location: [facility] CATH LAB CV     CV LEFT HEART CATH N/A 1/24/2022    Procedure: Left Heart Cath;  Surgeon: Christi Saunders MD;  Location: [facility] LAB CV     CV RIGHT AND LEFT HEART CATH N/A 1/24/2022    Procedure: Right and Left Heart Catherization;  Surgeon: Christi Saunders MD;  Location: [facility] CV     EP ICD GENERATOR REPLACEMENT DUAL N/A 10/28/2022    Procedure: Implantable Cardioverter Defibrillator Generator Replacement Dual;  Surgeon: Elo Collins MD;   Location: [facility] CATH LAB CV     EP ICD INSERT       FRACTURE SURGERY Left     wrist     INGUINAL HERNIA REPAIR Left 1967    while in the [employer] in [place] after 13 month in [place]     INSERT / REPLACE / REMOVE PACEMAKER       IR MISCELLANEOUS PROCEDURE  4/30/2014     OTHER SURGICAL HISTORY      left hand surgery---tendon repair     [place] ABLATE HEART DYSRHYTHM FOCUS  04/2011    Catheter Ablation Atrial Fibrillation PVI Apr 2011 (Cryo+RF-PVI + roof line + CTI line)     NC ABLATE HEART DYSRHYTHM FOCUS  07/2011    Re-do PVI Jul 2011 (RFA-PVI + CFE + VIDYA + confirmation of CTI line)     TOTAL SHOULDER REPLACEMENT Right 03/03/2016    Dr. Abernathy of [facility]     WRIST SURGERY Left      ZZC MYERS W/O FACETEC FORAMOT/DSKC 1/2 VRT SEG, CERVICAL      Laminectomy Lumbar;  Recorded: 03/09/2012;       Social History  Tobacco:   History   Smoking Status     Former     Packs/day: 1.00     Years: 4.00     Types: Cigarettes     Quit date: 1/1/1968   Smokeless Tobacco     Never    Alcohol:   Social History    Substance and Sexual Activity      Alcohol use: Yes        Alcohol/week: 2.0 standard drinks of alcohol        Comment: Alcoholic Drinks/day: 1 beer per week   Illicit Drugs:   History   Drug Use No       Family History  Family History   Problem Relation Age of Onset     Cancer Mother         leukemia     Cancer Father         bladder     Cancer Sister         breast with lung met.     Aneurysm Sister      CABG Brother      CABG Brother      Valvular heart disease Brother         valve replacement          Deon Iglesias MD on 5/31/2023      cc: Vivek Guerrero

## 2023-05-31 NOTE — PROGRESS NOTES
Care Management Follow Up    Length of Stay (days): 13    Expected Discharge Date: 06/02/2023     Concerns to be Addressed: discharge planning; medical progression      Patient plan of care discussed at interdisciplinary rounds: Yes    Anticipated Discharge Disposition: Home with Home Care     Anticipated Discharge Services: Home Care  Anticipated Discharge DME: Oxygen    Patient/family educated on Medicare website which has current facility and service quality ratings: no  Education Provided on the Discharge Plan: Yes  Patient/Family in Agreement with the Plan: yes     Referrals Placed by CM/SW: Homecare  Private pay costs discussed: Not applicable    Additional Information:  8:43 AM  Pt continues to require Hi-Flow O2 overnight. Pt's baseline is 6L-10L at home. Therapy recommendations are for TCU but Pt would not qualify for TCU due to high O2 needs. Pt would have to be under 6L O2 to be considered by TCUs. Per Palliative note, Pt's goals remain life-prolonging and Pt would prefer going home with his spouse over going to TCU. Pt has already been accepted by [facility] for PT/OT/RN/HHA.    SW received a voicemail from Josy [phone] at [facility] who was requesting an update on Pt's anticipated discharge date. SW called Josy back and left a voicemail with  call-back [phone].    SW met and spoke with Pt and his spouse regarding discharge planning. Pt reported that his goal is to discharge home and would be agreeable to home care services. SW and Pt discussed [facility] being the service provided once Pt discharges. Pt confirmed he receives his oxgen through [employer]. Pt and his spouse denied having further question for CM at this time. CM to continue to follow and assist with discharge planning.    VEE Johnston

## 2023-06-01 NOTE — PLAN OF CARE
Physical Therapy Discharge Summary    Reason for therapy discharge:    Discharged to home with home therapy.    Progress towards therapy goal(s). See goals on Care Plan in [facility] electronic health record for goal details.  Goals partially met.  Barriers to achieving goals:   discharge from facility.    Therapy recommendation(s):    Continued therapy is recommended.  Rationale/Recommendations:  Pt to continue rehabilitation with home PT..     Hiram Armstrong, SPT

## 2023-06-01 NOTE — PLAN OF CARE
Heart Failure Care Map  GOALS TO BE MET BEFORE DISCHARGE:    1. Decrease congestion and/or edema with diuretic therapy to achieve near optimal volume status.     Dyspnea improved: No, further care required to meet this goal. Please explain remains LIMA   Edema improved: Yes, satisfactory for discharge.        Last 24 hour I/O:   Intake/Output Summary (Last 24 hours) at 6/1/2023 0533  Last data filed at 6/1/2023 0405  Gross per 24 hour   Intake 1077 ml   Output 1650 ml   Net -573 ml           Net I/O and Weights since admission:   05/02 0700 - 06/01 0659  In: 16980.2 [P.O.:29864; I.V.:1339.2]  Out: 66612 [Urine:96989]  Net: -23152.8     Vitals:    05/18/23 0839 05/18/23 1815 05/19/23 0412 05/20/23 0348   Weight: 130.6 kg (288 lb) 122 kg (268 lb 15.4 oz) 123.5 kg (272 lb 4.3 oz) 121.9 kg (268 lb 11.9 oz)    05/21/23 0335 05/22/23 0616 05/23/23 0653 05/24/23 0341   Weight: 121.8 kg (268 lb 8.3 oz) 120.6 kg (265 lb 14 oz) 120.3 kg (265 lb 4.8 oz) 117 kg (257 lb 15 oz)    05/25/23 0358 05/25/23 1111 05/26/23 0345 05/27/23 0346   Weight: 117 kg (257 lb 15 oz) 116.7 kg (257 lb 3.2 oz) 116.6 kg (257 lb 0.9 oz) 112.8 kg (248 lb 10.9 oz)    05/27/23 1142 05/28/23 0327 05/28/23 1124 05/29/23 0656   Weight: 113.7 kg (250 lb 9.6 oz) 115.4 kg (254 lb 8 oz) 110.3 kg (243 lb 3.2 oz) 115.2 kg (254 lb)    05/30/23 0344 05/31/23 0403 06/01/23 0400   Weight: 114.3 kg (252 lb) 112.1 kg (247 lb 2.2 oz) 111.1 kg (244 lb 14.9 oz)       2.  O2 sats > 90% on room air, or at prior home O2 therapy level.      Able to wean O2 this shift to keep sats above 90%?: No, further care required to meet this goal. Please explain pt still requires 6LNC to maintain saturation in low 90's   Does patient use Home O2? Yes-  10L          Current oxygenation status:   SpO2: 93 %     O2 Device: Nasal cannula, Oxygen Delivery: 6 LPM    3.  Tolerates ambulation and mobility near baseline.     Ambulation: No, further care required to meet this goal. Please explain  pt with generalized weakness   Times patient ambulated with staff this shift: 0    Please review the Heart Failure Care Map for additional HF goal outcomes.    Chadd Arroyo RN  6/1/2023

## 2023-06-01 NOTE — PROGRESS NOTES
ANTICOAGULATION  MANAGEMENT: Discharge Review    Buck Sinclair chart reviewed for anticoagulation continuity of care    Hospital Admission on 5/18/23-6/1/23 for acute on chronic congestive heart failure.    Discharge disposition: Home with Home Care (patient declined TCU)    [facility]  Services: Home Health Services  Address: [address] [place] [id number]   Phone: [phone]    Results:    Recent labs: (last 7 days)     05/26/23  0438 05/27/23  0452 05/28/23  0433 05/29/23  0512 05/30/23  0520 05/31/23  0544 06/01/23  0539   INR 3.24* 3.32* 3.02* 2.96* 2.91* 2.66* 2.39*     Anticoagulation inpatient management:     less warfarin administered than maintenance regimen    Anticoagulation discharge instructions:     Warfarin dosing: decrease dose to 2.5 mg daily   Bridging: No   INR goal change: No      Medication changes affecting anticoagulation: Yes: patient started aspirin which can increase INR    Additional factors affecting anticoagulation: Yes: weakness and deconditioning      PLAN     Recommend to check INR on 6/1/23 with home meter   Writer spoke with [facility]- it is unclear when they will be out to see patient (they have not received home care orders at this time). Will have patient check INR with home meter tomorrow.    Spoke with Buck    Anticoagulation Calendar updated    Gisele Saul RN

## 2023-06-01 NOTE — PLAN OF CARE
Occupational Therapy Discharge Summary    Reason for therapy discharge:    Discharged to home with home therapy.    Progress towards therapy goal(s). See goals on Care Plan in [facility] electronic health record for goal details.  Goals partially met.  Barriers to achieving goals:   discharge from facility.    Therapy recommendation(s):    Continued therapy is recommended.  Rationale/Recommendations:  home w/ home services .    Goal Outcome Evaluation:

## 2023-06-01 NOTE — PROGRESS NOTES
Care Management Discharge Note    Discharge Date: 06/01/2023       Discharge Disposition: Home with family and services     Discharge Services: [facility] (RN/PT/OT/HHA)    Discharge DME: Oxygen    Discharge Transportation: family or friend will provide    Private pay costs discussed: Not applicable    Does the patient's insurance plan have a 3 day qualifying hospital stay waiver?  Yes   Will the waiver be used for post-acute placement? No    PAS Confirmation Code: N/A   Patient/family educated on Medicare website which has current facility and service quality ratings: no    Education Provided on the Discharge Plan: Yes  Persons Notified of Discharge Plans: N/A  Patient/Family in Agreement with the Plan: yes     Handoff Referral Completed: Yes    Additional Information:  11:43 AM  Plan is for Pt to discharge home with his wife and start services with [facility] for PT/OT/RN/HHA. JUDIE called and updated the home care agency that Pt will be discharging today. Orders faxed to agency. Home care information added to Pt's AVS to have upon discharge. CM had met and informed Pt about home care services set up for discharge yesterday. Pt denied having further need or questions from CM during that time. Family to transport.     12:45 PM  Pt reported having questions about transitioning oxygen suppliers. JUDIE called [employer] to ask information about transitioning companies and was informed that Pt may have an easier transition completing it outpatient with a new order from the Pt's PCP. JUDIE informed Pt and his wife regarding oxygen suppliers. Pt's spouse reported understanding and plans to contact the clinic about receiving a new order. Pt and his spouse denied having further needs or questions for CM.    CM will sign off. Please contact CM if any additional needs arise.    VEE Johnston

## 2023-06-01 NOTE — PLAN OF CARE
Goal Outcome Evaluation:  A/Ox4. VSS. Denies pain. Tele a-fib. LS clear. SOB with activity. Continues on 6L NC, sats between 88-low 90s. Desats with movement into low 80s. New mepilex placed on L buttocks. 1500 FR, education about fluid restriction reinforced. Pt verbalizes understanding. K protocol, AM recheck. Bed alarm on, call light in reach.       Heart Failure Care Map  GOALS TO BE MET BEFORE DISCHARGE:    1. Decrease congestion and/or edema with diuretic therapy to achieve near optimal volume status.     Dyspnea improved: No, further care required to meet this goal. Please explain SOB with movement   Edema improved: Yes, satisfactory for discharge.        Last 24 hour I/O:   Intake/Output Summary (Last 24 hours) at 5/31/2023 2224  Last data filed at 5/31/2023 2000  Gross per 24 hour   Intake 1077 ml   Output 1500 ml   Net -423 ml           Net I/O and Weights since admission:   05/01 2300 - 05/31 2259  In: 89168.2 [P.O.:40991; I.V.:1339.2]  Out: 26546 [Urine:60171]  Net: -58533.8     Vitals:    05/18/23 0839 05/18/23 1815 05/19/23 0412 05/20/23 0348   Weight: 130.6 kg (288 lb) 122 kg (268 lb 15.4 oz) 123.5 kg (272 lb 4.3 oz) 121.9 kg (268 lb 11.9 oz)    05/21/23 0335 05/22/23 0616 05/23/23 0653 05/24/23 0341   Weight: 121.8 kg (268 lb 8.3 oz) 120.6 kg (265 lb 14 oz) 120.3 kg (265 lb 4.8 oz) 117 kg (257 lb 15 oz)    05/25/23 0358 05/25/23 1111 05/26/23 0345 05/27/23 0346   Weight: 117 kg (257 lb 15 oz) 116.7 kg (257 lb 3.2 oz) 116.6 kg (257 lb 0.9 oz) 112.8 kg (248 lb 10.9 oz)    05/27/23 1142 05/28/23 0327 05/28/23 1124 05/29/23 0656   Weight: 113.7 kg (250 lb 9.6 oz) 115.4 kg (254 lb 8 oz) 110.3 kg (243 lb 3.2 oz) 115.2 kg (254 lb)    05/30/23 0344 05/31/23 0403   Weight: 114.3 kg (252 lb) 112.1 kg (247 lb 2.2 oz)       2.  O2 sats > 90% on room air, or at prior home O2 therapy level.      Able to wean O2 this shift to keep sats above 90%?: No, further care required to meet this goal. Please explain Pt still  on 6L NC   Does patient use Home O2? Yes-  6-10L          Current oxygenation status:   SpO2: 91 %     O2 Device: Nasal cannula, Oxygen Delivery: 6 LPM    3.  Tolerates ambulation and mobility near baseline.     Ambulation: No, further care required to meet this goal. Please explain Pt very SOB with activity. Ambulates to BR.   Times patient ambulated with staff this shift: 2    Please review the Heart Failure Care Map for additional HF goal outcomes.    Pearl Hassan RN  5/31/2023

## 2023-06-01 NOTE — PROGRESS NOTES
RESPIRATORY CARE NOTE     Patient Name: Buck Sinclair  Today's Date: 6/1/2023       Pt continues on 6LNC and appears to be breathing shallowly but denies any shortness of breath at this time.BS: good aerations auscultated upper airways and decreased @ bases. No cough effort have been observed at this time. Pt seems to desat with activities down to 85% but able to maintain adequate saturations at rest. Will continue to monitor closely and assess as needed.     Santy Mcdowell RRT

## 2023-06-01 NOTE — PROVIDER NOTIFICATION
RCAT Treatment Plan    Patient Score: 10  Patient Acuity: 4    Clinical Indication for Therapy: COPD    Therapy Ordered: Atrovent/Xopenex/Flutter Valve     Assessment Summary: Pt remains on 6LNC and overall stable with occasional desaturations/dyspnea with activities. BS: fine crackles at RML and decreased @ bases. Pt does his Flutter Valve and IS independently. Continue current plane of care and RCAT within 72 hrs.     Santy Mcdowell, RT  6/1/2023

## 2023-06-01 NOTE — DISCHARGE SUMMARY
Pt discharged at approximately 1300. Escorted out in a wheelchair with home o2 concentrator accompanied by spouse and CNA. AVS reviewed with pt and spouse and signed. No questions or concerns.

## 2023-06-01 NOTE — PROGRESS NOTES
"PALLIATIVE CARE SOCIAL WORK Progress Note   Location: [facility]    Joint visit with Palliative OZ Dali Holman. Met with Pt at bedside. He was awake, alert, oriented and pleasant. He is happy today that he finally gets to return home. Pt is most looking forward to and [place]   Talked with Pt about this prolonged hospitalization and his thoughts about what he might want for care as his disease progresses. He shared that he has had heart failure for many years, with several procedures and interventions. He said he knows that this is going to get worse and he is accepting of that. He remembered earlier conversation with Palliative team about code status and said \"I would not want to be a vegetable or on a machine\" and then asked \"what do I have to do to be DNR/DNI?\" Talked with him about wishes for code status and that it is good for him to talk to his doctors about his wishes for DNR/DNI and that if he comes back to the hospital he can make wishes known then too. 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 Discussed importance of a HCD and POLST and provided him with copies of these documents for review. Since Pt is discharging today, encouraged him to look over with this wife and encouraged him to talk to his doctors at any follow up visits as well.   Pt is open to Outpatient Palliative Care referral as well.      PCSW alerted Fay in care management that Pt is wishing to change his oxygen supplier. She will follow up.     Plan: PCSW is available if further needs arise.     Clinical Social Work Interventions:   Goals of care discussion/facilitation  Other: general support      Janice Ogden [facility]  [facility], Palliative Care  Securely message with the Vocera Web Console (learn more here) or  Text page via [employer] Paging/Directory           "

## 2023-06-01 NOTE — PROGRESS NOTES
PALLIATIVE CARE PROGRESS NOTE  [facility]     Patient Name: Buck Sinclair  Date of Admission: 5/18/2023   Today the patient was seen for: [facility] follow-up      Recommendations & Counseling     Patient: Buck Sinclair  Date of Admission:  5/18/2023     Requesting Clinician / Team: Hospital medicine  Reason for consult: Goals of care      Recommendations & Counseling      GOALS OF CARE:     Life-prolonging currently without limits.      Return home with home services     Would not want to be in long-term care    Salvador and family to discuss further his wishes and code status     Recommend OP palliative clinic follow-up for continued support and exploration of cares. Referral placed.      ADVANCE CARE PLANNING:    No health care directive on file. Provided patient with blank health care directive and plans to discuss with his wife after discharging from the hospital    There is no POLST form on file-provided with blank document for patient to review    Code status: Full Code     MEDICAL MANAGEMENT:   #Dyspnea,Pulmonary Hypertension, HFrEF     Medical and diuretic management per primary team, cardiology.  Pulmonary signed off.      Oxygen, baseline 6-10 lpm.     Repositioning    Fan at bedside     #General Weakness/Debility     Physical Therapy    Occupational therapy    Therapy recommending TCU, however patient requesting to go home with home care services and support from wife. Plans to discharge today     PSYCHOSOCIAL/SPIRITUAL:    Family     Friends    [place] community: [employer]      Thank you for the consult and allowing us to aid in the care of Buck Sinclair.         Rosita Holman, OSWALD  [facility], Palliative Care  Securely message with the [employer] Web Console (learn more here) or  Text page via [facility] Paging/Directory             Assessments             Buck Sinclair is a 78 year old male who has a past medical history significant for HFrEF, status post dual-chamber  ICD, CAD, hypertension, persistent atrial fibrillation, chronic hypoxic respiratory failure most recently 10 L PNC, severe COPD who presented to the emergency department due to acute on chronic shortness of breath.  Patient has had progressively worsening dyspnea on exertion along with this paroxysmal nocturnal dyspnea and edema/fluid retention.  No chest pain.  No cough.  No abdominal pain.  No melena, hematochezia or hematemesis.  Patient was briefly seen in the pulmonology clinic by Dr. Payton this morning and upon patient's arrival he looked cyanotic, tachypneic and his SPO2 was in the 70s on 10 L/min.  Ambulance contacted right away and transported patient to the emergency department.  In the emergency department, patient was noted to be on respiratory distress and was therefore placed on BiPAP which has now been weaned down to 6 L nasal cannula.  Admitted for further evaluation and management  Prognosis, Goals, or Advance Care Planning was addressed today with: Yes.  Mood, coping, and/or meaning in the context of serious illness were addressed today: Yes.              Interval History:     Chart review/discussion with unit or clinical team members:   Plans to discharge today. Cardiology signed off.     Per patient or family/caregivers today:  Patient looking forward to going home.  Really hoping to get to his cabin soon.  We discussed advance directive and code status.  States he does not have health care directive but would consider discussing with his wife and completing once he gets ome.  States he has thought about code status but has not made decision to make any changes.  Feels well educated on his heart and lung conditions, as he has been dealing with these issues for many years.  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    He is agreeable to following up in outpatient palliative care clinic after discharging.           Review of Systems:      Besides above, an additional  system ROS was reviewed and is unremarkable               Physical Exam:   Temp: 97.3  F (36.3  C) Temp src: Oral Temp  Min: 97.3  F (36.3  C)  Max: 98.5  F (36.9  C) BP: 103/70 Pulse: 71   Resp: 22 SpO2: 96 % O2 Device: Nasal cannula Oxygen Delivery: 6 LPM  Vital Signs with Ranges  Temp:  [97.3  F (36.3  C)-98.5  F (36.9  C)] 97.3  F (36.3  C)  Pulse:  [67-87] 71  Resp:  [16-22] 22  BP: (103-121)/(65-81) 103/70  SpO2:  [90 %-96 %] 96 %  242 lbs 11.2 oz    General: Sitting up in chair, NAD  Neuro: alert and oriented X 4           Current Problem List:   Principal Problem:    Acute on chronic congestive heart failure, unspecified heart failure type (H)  Active Problems:    Cardiomyopathy (H)    Essential hypertension    Persistent Atrial Fibrillation    CKD (chronic kidney disease) stage 3, GFR 30-59 ml/min (H)    Acute on chronic respiratory failure with hypoxia (H)      Allergies   Allergen Reactions     Adhesive Tape Other (See Comments)     ADHESIVE TAPE; SKIN IRRITATION; Skin pulled off with foam tape       Amiodarone      ADVERSE REACTION.  Sunlight sensitivity.     Lisinopril             Data Reviewed:   Lab results reviewed.   ====================================================  TT: I have personally spent a total of 35 minutes on the date of the encounter,  on the unit in review of medical record, consultation with the medical providers and assessment of Buck Sinclair  today, with more than 50% of this time spent in counseling, coordination of care, and discussion re: diagnostic results, symptom management, risks and benefits of management options, and development of plan of care as noted above.      ====================================================

## 2023-06-01 NOTE — DISCHARGE SUMMARY
"[facility]  Hospitalist Discharge Summary      Date of Admission:  5/18/2023  Date of Discharge:  6/1/2023  Discharging Provider: Roel York MD  Discharge Service: Hospitalist Service    Discharge Diagnoses   Acute on chronic CHF, electrolyte dysfunction  Acute on chronic respiratory failure with hypoxia  COPD exacerbation  Moderate to severe pulmonary hypertension  Community-acquired pneumonia  Coronary artery disease  Chronic A-fib  Coagulopathy  Acute on chronic kidney disease  Chronic kidney disease stage III  Oral candidiasis  Hypokalemia, replaced  Hypertension  Chronic anemia  Acute thrombocytopenia, improving  Weakness and deconditioning  Moderate malnutrition, protein and calories      Clinically Significant Risk Factors     # Obesity: Estimated body mass index is 30.34 kg/m  as calculated from the following:    Height as of this encounter: 1.905 m (6' 3\").    Weight as of this encounter: 110.1 kg (242 lb 11.2 oz).  # Moderate Malnutrition: based on nutrition assessment      Follow-ups Needed After Discharge   Follow-up Appointments     Add follow up information to the AVS prior to printing      Follow-up and recommended labs and tests       Follow up with primary care provider, Vivek Guerrero, within 7 days for   hospital follow- up.  The following labs/tests are recommended: BMP and   INR in 2-3 days .    Follow up with cardiology and pulmonology as schedule             Unresulted Labs Ordered in the Past 30 Days of this Admission     No orders found from 4/18/2023 to 5/19/2023.      These results will be followed up by PCP    Discharge Disposition   Discharged to home with home care  Condition at discharge: Stable    Hospital Course   78 year old male with h/o hypertension, persistent atrial fibrillation, CAD, COPD, and CHF who presents to this ED by ambulance for evaluation of shortness of breath.       HFrEF with ADHF: deemed nonischemic dilated cardiomyopathy.    -TTE " (9/13/2022) LVEF 55 to 60%, moderate TI, global RV function mild-moderately reduced, RSVP approximately 73 mmHg indicative of moderate pulmonary hypertension, estimated RA pressure 15 mmHg or greater.  -TTE (5/18/23) showed LVEF 55-60%, RV mod dilated, mod decreased RVS function, mod LAD, severe RAD, 2-3+ TR, severe PHTN, ~RAP >15mmHg  - Per cards continue bumex infusion stopped last ngiht and given albumin, holding oral Bumex initially and to start today.  -Continue losartan on hold   - trend renal function  - potassium replacement  - D/continue telemetry   -   Follow-up with CHF clinic as outpatientAcute on chronic hypoxic respiratory failure  Severe COPD   Moderate-to-severe pulmonary hypertension  -has been on oxygen chronically and for past several months was using 10L PNC?  -Acute decline due to volume overload in the setting of poor cardiopulmonary status chronically.  -required initially BiPAP on admission followed by HFNC and weaned to 8L on 5/22.  -Chest x-ray in emergency department showed new patchy airspace opacities in right lower lobe suggestive of area of pneumonitis/early proximal pneumonia.  Emphysematous changes. Cardiomegaly and mild pulmonary vascular congestion.    -CT Chest w/o contrast due to CHRISSY (5/19/23) showed Unchanged bibasilar peripheral parenchymal scarring. No acute lung, airway, or pleural space abnormality. Biatrial and right ventricular heart enlargement. Dilated central pulmonary arteries.   -Repeat limited TTE with bubble study performed on 5/21/23 which was negative for interatrial shunt.  - hold Breo-Ellipta and Incruse Ellipta while on scheduled xopenex/atrovent nebs TID  -prednisone taper almost completed   -discussed with Pulm and agrees with diuresing, signed off nothing further for them to add and he has follow up in a couple of weeks  - bronchial hygiene   - VQ scan low probability for PE  - consider repeat sleep study despite 2015 unremarkable with elevated pulm HTN  referral at discharge   -Back to baseline no home need oxygen which is 6 L at rest.     Possible CAP:   -Chest x-ray in emergency department showed new patchy airspace opacities in right lower lobe suggestive of area of pneumonitis/early proximal pneumonia.  Emphysematous changes. Cardiomegaly and mild pulmonary vascular congestion.    -CT Chest w/o contrast due to CHRISSY (5/19/23) showed Unchanged bibasilar peripheral parenchymal scarring. No acute lung, airway, or pleural space abnormality. Biatrial and right ventricular heart enlargement. Dilated central pulmonary arteries.   -Blood culture x2 ngtd, procalcitonin 0.01, respiratory panel PCR negative, MRSA nares negative.   - Empiric IV zosyn/Doxy stopped 5/23  - Augmentin/Doxy x 5 days from 5/23 completed course     Coronary artery disease:   -S/P CANDELARIA x3.  Coronary angiogram 1/24/2022 showed mild CAD with patent LAD stents in the proximal and mid LAD, left circumflex third obtuse marginal branch 30% stenosis, mid RCA lesion 20% stenosis.  LVEDP and PCWP normal.  PAP 66/24 mmHg with a mean pressure of 37 mmHg.  Shikha and TD cardiac outputs both 5.7 L/min.  -On warfarin, sotalol, statin  -start ASA 81mg qd as per cardiology  - No anginal complaints  - Troponin T-HS 31 ->30     Medtronic dual-chamber implantable cardiac defibrillator: -Indication:  primary prevention of sudden cardiac death.  -placed on 6/11/2014 with generator change on 10/28/2022  -device interrogation on 5/16/23 reviewed.       Persistent atrial fibrillation;  Supratherapeutic anticoagulation- resolved.  -per Dr. Garrett's last note, status post multiple electrical cardioversions, most recently on 11/19/2021. He underwent complex catheter ablation x2 in 2011. Overall fairly low burden of atrial arrhythmia based on his device interrogations. [id number] score is at least 5 (congestive heart failure, hypertension, age 75 or greater, coronary artery disease).  -warfarin, pharmacy to dose  --Sotalol dose  reduced to 120mg daily     CHRISSY on CKD stage III: CHRISSY due to IV diuresis with net negative fluid loss of .  Admission Cr 1.64 carlo to peak of 1.96 (5/22) -> 1.88 today after IV diuresis was stopped on 5/22.  Baseline creatinine 1.5-1.6.   -hold Losartan until renal recovery near baseline then resume  - IV bumex continued and renal function stable   -Transition to oral Bumex and creatinine continue to improve  -Continue to monitor renal function as     HTN:  -Sotalol dose reduced to 120mg q24hrs due to CrCl.  - bumex infusion now held and transition to oral   - holding losartan on discharge because of CHRISSY  - Continue to monitor blood pressure as outpatient     Oral Candidiasis:  -Nystatin Sw/Sw x 5 days completed      HLD:  -lipitor     Chronic macrocytic anemia, acute thrombocytopenia:  Platelets 139. Dale 123 (5/22/23) TSH normal.  Hgb stable at ~11. Baseline 10-11.  B12/Folate high. Normal ptt, fibrinogen, and D Dimer makes DIC less likely.      Hypokalemia:  - replace per protocol     Weakness and deconditioning  - PT/OT evaluation  -Patient declined TCU  -Discharge with home care    Moderate malnutrition  -Protein and calories  -Dietitian consult    Discussed with patient, family, cardiology, nursing staff and discharge planner    Consultations This Hospital Stay   CORE CLINIC EVALUATION IP CONSULT  NUTRITION SERVICES ADULT IP CONSULT  CARE MANAGEMENT / SOCIAL WORK IP CONSULT  PHYSICAL THERAPY ADULT IP CONSULT  OCCUPATIONAL THERAPY ADULT IP CONSULT  CARDIOLOGY IP CONSULT  PHARMACY TO DOSE WARFARIN  PHARMACY TO DOSE VANCO  PHARMACY IP CONSULT  PULMONARY IP CONSULT  PALLIATIVE CARE ADULT IP CONSULT  WOUND OSTOMY CONTINENCE NURSE  IP CONSULT  CARDIOLOGY IP CONSULT    Code Status   Full Code    Time Spent on this Encounter   IRoel MD, personally saw the patient today and spent greater than 30 minutes discharging this patient.       Roel York MD  [facility]  [id number]  [facility] [address]  Phone: [phone]  Fax: [phone]  ______________________________________________________________________    Physical Exam   Vital Signs: Temp: 97.3  F (36.3  C) Temp src: Oral BP: 103/70 Pulse: 71   Resp: 22 SpO2: 96 % O2 Device: Nasal cannula Oxygen Delivery: 6 LPM  Weight: 242 lbs 11.2 oz  Constitutional: awake, alert, cooperative, no apparent distress, and appears stated age  Respiratory: Decreased breath sounds on both sides with poor air air movement, scattered rhonchi.  No wheeze.    Cardiovascular: Normal apical impulse, regular rate and rhythm, normal S1 and S2, no S3 or S4, and no murmur noted  GI: No scars, normal bowel sounds, soft, non-distended, non-tender, no masses palpated, no hepatosplenomegally  Skin: no bruising or bleeding and normal skin color, texture, turgor  Musculoskeletal: There is no redness, warmth, or swelling of the joints.  Full range of motion noted.  no lower extremity pitting edema present  Neurologic: Awake, alert, oriented to name, place and time.  Cranial nerves II-XII are grossly intact.  Motor is 5 out of 5 bilaterally.   Sensory is intact.    Neuropsychiatric: Appropriate with examiner       Primary Care Physician   Vivek Guerrero    Discharge Orders      Follow-Up with Cardiology      Home Care Referral      Follow-up and recommended labs and tests     Follow up with primary care provider, Vivek Guerrero, within 7 days for hospital follow- up.  The following labs/tests are recommended: BMP and INR in 2-3 days .    Follow up with cardiology and pulmonology as schedule     Activity    Your activity upon discharge: activity as tolerated     Reason for your hospital stay    Acute CHF     Monitor and record    Weigh yourself every morning     When to contact your care team    Contact your care team If symptoms get worse, If increased shortness of breath, If waking up at night with difficulty breathing, If unable to lie down for sleep  due to symptoms, If weight gain of 2 pounds a day for 2 days in a row OR 5 pounds in 1 week., Increased swelling in your ankles or legs, and Dizziness or lightheadedness     Discharge Follow Up - with Primary Care clinic within 3-5 days (RN to schedule prior to d/c for HE/[facility] primary care     Add follow up information to the AVS prior to printing     Diet    Follow this diet upon discharge: Orders Placed This Encounter      Fluid restriction 1500 ML FLUID      Advance Diet as Tolerated: Regular Diet Adult; 2 gm NA Diet       Significant Results and Procedures   Most Recent 3 CBC's:Recent Labs   Lab Test 06/01/23  0539 05/25/23  0444 05/23/23  0438   WBC 12.6* 10.9 9.0   HGB 11.7* 12.2* 11.8*    99 101*   * 134* 139*     Most Recent 3 BMP's:Recent Labs   Lab Test 06/01/23  0539 05/31/23  0544 05/30/23  0520    144 143   POTASSIUM 3.8 3.5 3.6   CHLORIDE 99 98 98   CO2 31* 32* 31*   BUN 59.5* 65.6* 60.9*   CR 1.75* 1.81* 1.83*   ANIONGAP 13 14 14   AMBIKA 9.5 9.6 9.6   * 120* 115*   ,   Results for orders placed or performed during the hospital encounter of 05/18/23   XR Chest Port 1 View    Narrative    EXAM: XR CHEST PORT 1 VIEW  LOCATION: [facility]  DATE/TIME: 5/18/2023 9:20 AM CDT    INDICATION: Dyspnea  COMPARISON: CT chest 08/17/2022 and multiple prior studies, chest x-ray 02/21/2022, 10/25/2018      Impression    IMPRESSION: Multi lead right subclavian venous pacemaker. Leads are intact and unchanged in position.     There are new, patchy airspace opacities in the right lower lobe suggestive of an area of pneumonitis/early bronchopneumonia. Emphysematous changes are better appreciated on the CT.    No change in the cardiomegaly and mild pulmonary vascular congestion. No signs of failure.    CT Chest w/o Contrast    Narrative    EXAM: CT CHEST W/O CONTRAST  LOCATION: [facility]  DATE/TIME: 5/19/2023 10:38 AM CDT    INDICATION: dyspnea,  hypoxia, CHF, possible pneumonia  COMPARISON: CT of the chest 08/17/2022  TECHNIQUE: CT chest without IV contrast. Multiplanar reformats were obtained. Dose reduction techniques were used.  CONTRAST: None.    FINDINGS:   LUNGS AND PLEURA: Unchanged parenchymal heterogeneity relatively lucent apices and hazy groundglass attenuation within the bases. There are areas of subpleural architectural distortion and opacity in both bases consistent with cicatrization atelectasis.   Such opacity in the medial right lower lobe is associated with crowding of bronchovascular structures and a few internal air bronchograms. These findings are unchanged. No new airspace opacities or interlobular septal thickening. Trachea and central   airways are patent and normal caliber. No endoluminal airway material.    MEDIASTINUM: Right subclavian approach dual chamber pacemaker has right atrial appendage and right ventricular leads. Severe enlargement of the right atrium. Mild enlargement of the left atrium. There are left atrial wall calcifications, likely sequela   of prior ablation procedure. Dilated main and central pulmonary arteries. Mild calcification of aortic and mitral annuli. No pericardial effusion or thickening. Hilar and mediastinal lymph nodes are normal by size criteria. Esophagus is decompressed.    CORONARY ARTERY CALCIFICATION: Severe.    UPPER ABDOMEN: Symmetric renal atrophy.    MUSCULOSKELETAL: Unchanged thoracic spine degenerative osteophytes from T3 through the thoracolumbar junction. No vertebral compression deformity. There is a right shoulder joint replacement.      Impression    IMPRESSION:     1.  Unchanged bibasilar peripheral parenchymal scarring. No acute lung, airway, or pleural space abnormality.  2.  Biatrial and right ventricular heart enlargement. Dilated central pulmonary arteries. Findings are consistent with acute pulmonary hypertension. Calcification in the walls of the left atrium likely from prior  ablation procedure.     NM Lung Scan Perfusion Particulate    Narrative    EXAM: NM LUNG SCAN PERFUSION PARTICULATE  LOCATION: [facility]  DATE/TIME: 2023 8:11 AM CDT    INDICATION: Shortness of breath.  COMPARISON: Chest CT 2023, chest x-ray 2023.  TECHNIQUE: 8.18 mCi technetium-99m MAA, IV. Standard lung perfusion imaging.    FINDINGS: No segmental perfusion defect. There are 2, tiny subsegmental perfusion defects in the left lung. Right-sided defect corresponding to patient's cardiac assist device.      Impression    IMPRESSION:     Low probability of pulmonary embolism.                  XR Chest 2 Views    Narrative    EXAM: XR CHEST 2 VIEWS  LOCATION: [facility]  DATE/TIME: 2023 8:09 AM CDT    INDICATION: Rule out chronic thromboembolic disease  COMPARISON: 2023      Impression    IMPRESSION: Patchy bibasilar opacities are favored to reflect atelectasis. No superimposed focal consolidation. Please note that x-ray cannot rule out thromboembolic disease. If there is clinical concern, a PE chest CT is recommended.   Echocardiogram Complete     Value    LVEF  55-60%    Narrative    [id number]  [id number]  [id number]  [id number]^EDWIN^ROSA^[place] [address]     Name: ALEJO BROWN  MRN: [id number]  : 1945  Study Date: 2023 01:34 PM  Age: 78 yrs  Gender: Male  Patient Location: [facility]  Reason For Study: Heart Failure  Ordering Physician: ROSA PINEDA  Performed By: JAI     BSA: 2.6 m2  Height: 75 in  Weight: 288 lb  BP: 112/77 mmHg  ______________________________________________________________________________  Procedure  Complete Portable Echo Adult. Definity (NDC #36287-388) given intravenously.  ______________________________________________________________________________  Interpretation Summary     Left ventricular size, wall motion and function are normal. The  ejection  fraction is 55-60%.  The right ventricle is moderately dilated.  Moderately decreased right ventricular systolic function  The left atrium is moderately dilated.  The right atrium is severely dilated.  There is moderate to mod-severe (2-3+) tricuspid regurgitation.  Severe (>55mmHg) pulmonary hypertension is present.  IVC diameter >2.1 cm collapsing <50% with sniff suggests a high RA pressure  estimated at 15 mmHg or greater.  ______________________________________________________________________________  Left Ventricle  Left ventricular size, wall motion and function are normal. The ejection  fraction is 55-60%. There is normal left ventricular wall thickness. Left  ventricular diastolic function is abnormal. No regional wall motion  abnormalities noted.     Right Ventricle  The right ventricle is moderately dilated. Moderately decreased right  ventricular systolic function. There is a pacemaker lead in the right  ventricle.     Atria  The left atrium is moderately dilated. The right atrium is severely dilated.  There is no color Doppler evidence of an atrial shunt.     Mitral Valve  There is mild mitral annular calcification. There is mild (1+) mitral  regurgitation.     Tricuspid Valve  Tricuspid valve fails to coapt. There is moderate to mod-severe (2-3+)  tricuspid regurgitation. The right ventricular systolic pressure is  approximated at 48.7 mmHg plus the right atrial pressure. Severe (>55mmHg)  pulmonary hypertension is present.     Aortic Valve  There is mild trileaflet aortic sclerosis.     Pulmonic Valve  The pulmonic valve is not well seen, but is grossly normal. This degree of  valvular regurgitation is within normal limits.     Vessels  The aorta root is normal. Normal size ascending aorta. IVC diameter >2.1 cm  collapsing <50% with sniff suggests a high RA pressure estimated at 15 mmHg or  greater.     Pericardium  There is no pericardial effusion.      ______________________________________________________________________________  MMode/2D Measurements & Calculations  IVSd: 0.97 cm  LVIDd: 5.5 cm  LVIDs: 3.9 cm  LVPWd: 1.1 cm     FS: 29.2 %  LV mass(C)d: 223.2 grams  LV mass(C)dI: 87.1 grams/m2  Ao root diam: 3.8 cm  LA dimension: 5.5 cm  asc Aorta Diam: 3.5 cm  LA/Ao: 1.4  LVOT diam: 2.4 cm  LVOT area: 4.5 cm2  LA Volume Indexed (AL/bp): 48.8 ml/m2  RV Base: 5.6 cm  RWT: 0.41  TAPSE: 1.6 cm     Time Measurements  MM HR: 68.0 BPM     Doppler Measurements & Calculations  MV E max goldy: 129.0 cm/sec  MV A max goldy: 24.5 cm/sec  MV E/A: 5.3  MV dec time: 0.18 sec  LV V1 max P.8 mmHg  LV V1 max: 67.1 cm/sec  LV V1 VTI: 12.6 cm  MR PISA: 6.3 cm2  MR ERO: 0.45 cm2  MR volume: 61.8 ml  SV(LVOT): 57.0 ml  SI(LVOT): 22.2 ml/m2  PA acc time: 0.03 sec  TR max goldy: 349.0 cm/sec  TR max P.7 mmHg  Lateral E/e': 8.9  RV S Goldy: 8.3 cm/sec     ______________________________________________________________________________  Report approved by: Nalini Bowling 2023 02:46 PM         Echocardiogram Limited with Bubble Study     Value    LVEF  55-60%    Narrative    [id number]  [id number]  [id number]  [id number]^NELSON^AFUA^[place] [address]     Name: ALEJO BROWN  MRN: [id number]  : 1945  Study Date: 2023 02:37 PM  Age: 78 yrs  Gender: Male  Patient Location: [facility]  Reason For Study: Respiratory Failure  Ordering Physician: AFUA DAMON  Performed By: EMANI     BSA: 2.5 m2  Height: 75 in  Weight: 268 lb  HR: 57  ______________________________________________________________________________  Procedure  Limited Portable Bubble Echo Adult.  ______________________________________________________________________________  Interpretation Summary     Limited views were obtained.     1. The left ventricle is normal in size. Left ventricular function is  normal.The ejection fraction is 55-60%.  2. The right ventricle is  mildly dilated.Moderately decreased right  ventricular systolic function  3. The left atrium is moderate to severely dilated. The right atrium is  severely dilated.  4. A contrast injection (Bubble Study) was performed that was negative for  flow across the interatrial septum. There is no color Doppler evidence of an  atrial shunt.     Limited views were obtained.  ______________________________________________________________________________  Left Ventricle  The left ventricle is normal in size. Left ventricular function is normal.The  ejection fraction is 55-60%.     Right Ventricle  The right ventricle is mildly dilated. Moderately decreased right ventricular  systolic function. There is a pacemaker lead in the right ventricle.     Atria  The left atrium is moderate to severely dilated. The right atrium is severely  dilated. A contrast injection (Bubble Study) was performed that was negative  for flow across the interatrial septum. There is no color Doppler evidence of  an atrial shunt.     Mitral Valve  There is mild mitral annular calcification. The mitral valve leaflets are  mildly thickened.     Pericardium  There is no pericardial effusion.  ______________________________________________________________________________  Report approved by: Nalini Grijalva 05/21/2023 02:56 PM     ______________________________________________________________________________        *Note: Due to a large number of results and/or encounters for the requested time period, some results have not been displayed. A complete set of results can be found in Results Review.       Discharge Medications   Current Discharge Medication List      START taking these medications    Details   aspirin (ASA) 81 MG EC tablet Take 1 tablet (81 mg) by mouth daily  Qty: 90 tablet, Refills: 0    Associated Diagnoses: Acute on chronic congestive heart failure, unspecified heart failure type (H)      bumetanide (BUMEX) 2 MG tablet Take 1 tablet  (2 mg) by mouth 2 times daily for 90 days  Qty: 180 tablet, Refills: 0    Associated Diagnoses: Acute on chronic congestive heart failure, unspecified heart failure type (H)         CONTINUE these medications which have CHANGED    Details   sotalol (BETAPACE) 120 MG tablet Take 1 tablet (120 mg) by mouth every 24 hours for 90 days  Qty: 90 tablet, Refills: 0    Associated Diagnoses: Chronic atrial fibrillation (H)      warfarin ANTICOAGULANT (COUMADIN) 2.5 MG tablet Take 2.5 mg daily and recheck INR in 2-3 days  Qty: 116 tablet, Refills: 1    Associated Diagnoses: Chronic atrial fibrillation (H)         CONTINUE these medications which have NOT CHANGED    Details   acetaminophen (TYLENOL) 325 MG tablet Take 650 mg by mouth 2 times daily as needed      albuterol (PROAIR HFA/PROVENTIL HFA/VENTOLIN HFA) 108 (90 Base) MCG/ACT inhaler Inhale 2 puffs into the lungs every 4 hours as needed for shortness of breath / dyspnea or wheezing    Comments: Pharmacy may dispense brand covered by insurance (Proair, or proventil or ventolin or generic albuterol inhaler)      Ascorbic Acid (VITAMIN C) 500 MG CAPS Take 1,000 mg by mouth daily      atorvastatin (LIPITOR) 40 MG tablet Take 40 mg by mouth every evening      budesonide-formoterol (SYMBICORT) 160-4.5 MCG/ACT Inhaler Inhale 2 puffs into the lungs 2 times daily      co-enzyme Q-10 100 MG CAPS capsule Take 2 capsules (200 mg) by mouth daily  Qty: 2 capsule      fish oil-omega-3 fatty acids 1000 MG capsule Take 1 g by mouth 2 times daily      FLUoxetine (PROZAC) 20 MG capsule TAKE 1 CAPSULE BY MOUTH EVERY DAY      MAG64 64 MG TBEC CR tablet TAKE 2 TABLETS BY MOUTH EVERY DAY  Qty: 180 tablet, Refills: 1    Associated Diagnoses: Chronic heart failure with preserved ejection fraction (H)      multivitamin, therapeutic (THERA-VIT) TABS tablet Take 1 tablet by mouth daily      nitroGLYcerin (NITROSTAT) 0.4 MG sublingual tablet One tablet under the tongue every 5 minutes if needed for  "chest pain. May repeat every 5 minutes for a maximum of 3 doses in 15 minutes\"  Qty: 25 tablet, Refills: 3    Associated Diagnoses: Coronary artery disease due to calcified coronary lesion; Cardiomyopathy, unspecified type (H)      polyethylene glycol (MIRALAX) 17 GM/Dose powder Take 17 g by mouth daily       tiotropium (SPIRIVA) 18 MCG inhaled capsule Inhale 18 mcg into the lungs daily      vitamin D2 (ERGOCALCIFEROL) 45850 units (1250 mcg) capsule Take 50,000 Units by mouth twice a week On Sunday and Wednesday      OXYGEN-HELIUM IN 4-5 L          STOP taking these medications       furosemide (LASIX) 80 MG tablet Comments:   Reason for Stopping:         losartan (COZAAR) 25 MG tablet Comments:   Reason for Stopping:             Allergies   Allergies   Allergen Reactions     Adhesive Tape Other (See Comments)     ADHESIVE TAPE; SKIN IRRITATION; Skin pulled off with foam tape       Amiodarone      ADVERSE REACTION.  Sunlight sensitivity.     Lisinopril      "

## 2023-06-02 NOTE — PROGRESS NOTES
ANTICOAGULATION MANAGEMENT     Buck JONES Sinclair 78 year old male is on warfarin with supratherapeutic INR result. (Goal INR 2.0-3.0)    Recent labs: (last 7 days)     06/02/23  0000   INR 3.1*       ASSESSMENT       Source(s): Chart Review and Patient/Caregiver Call       Warfarin doses taken: Doses held while inpatient and less warfarin administered - see anticoagulation calendar.    Diet: Appetite is still not back to normal may be affecting diet and INR    Medication/supplement changes: Aspirin started not expected to affect INR, but may increase risk of bleeding    New illness, injury, or hospitalization: Yes: 5/18- 6/1 for acute on chronic CHF - per Neela patient lost about 20 pounds and still currently weak . Is getting PT. Edema is getting better.    Signs or symptoms of bleeding or clotting: No    Previous result: Therapeutic last 2(+) visits    Additional findings: Home Care was there this morning- patient will continue checking INR with home monitor and results called to Neela or Buck for dosing.         PLAN     Recommended plan for temporary change(s) affecting INR     Dosing Instructions: decrease your warfarin dose (7% change) with next INR in 4 days   ( Tuesdays is his regular day to check INR )    Summary  As of 6/2/2023    Full warfarin instructions:  6/2:  1.25 mg every Fri; 2.5 mg all other days   Next INR check:  6/6/2023             Telephone call with  patient's spouse Neela and Buck who verbalizes understanding and agrees to plan and who agrees to plan and repeated back plan correctly    Patient to recheck with home meter    Education provided:     Taking warfarin: Importance of taking warfarin as instructed    Goal range and lab monitoring: goal range and significance of current result, Importance of therapeutic range and Importance of following up at instructed interval    Plan made per [facility] anticoagulation protocol    Gisele Pirnce RN  Anticoagulation  Clinic  6/2/2023    _______________________________________________________________________     Anticoagulation Episode Summary     Current INR goal:  2.0-3.0   TTR:  83.0 % (11.6 mo)   Target end date:  Indefinite   Send INR reminders to:  [facility] HOME MONITORING    Indications    Persistent Atrial Fibrillation [I48.91]  Persistent atrial fibrillation (H) [I48.19]           Comments:  Home monitor ( [employer] )managed by exception         Anticoagulation Care Providers     Provider Role Specialty Phone number    Erica Hansen APRN CNP Referring Cardiovascular Disease [phone]    Domo Garrett MD Referring Cardiovascular Disease [phone]    Everett Renee MD  Cardiovascular Disease [phone]

## 2023-06-06 NOTE — PROGRESS NOTES
ANTICOAGULATION MANAGEMENT     Buck Sinclair 78 year old male is on warfarin with supratherapeutic INR result. (Goal INR 2.0-3.0)    Recent labs: (last 7 days)     06/06/23  0000   INR 3.1*       ASSESSMENT       Source(s): Chart Review and Patient/Caregiver Call       Warfarin doses taken: Warfarin taken as instructed    Diet: eating better, watching salt intake. Is having some greens. Not back to baseline.    Medication/supplement changes: None noted    New illness, injury, or hospitalization: Yes: see the 6/1/2023 ADT    Signs or symptoms of bleeding or clotting: No    Previous result: Supratherapeutic    Additional findings: None         PLAN     Recommended plan for temporary change(s) affecting INR     Dosing Instructions: decrease your warfarin dose (7.7% change) with next INR in 2 days       Summary  As of 6/6/2023    Full warfarin instructions:  1.25 mg every Tue, Fri; 2.5 mg all other days   Next INR check:  6/8/2023             Telephone call with  Neela, wife, who agrees to plan and repeated back plan correctly    Orders given to  Homecare nurse/facility to recheck    Education provided:     Dietary considerations: importance of consistent vitamin K intake    Plan made with [facility] Pharmacist Doreen Ortiz, RN  Anticoagulation Clinic  6/6/2023    _______________________________________________________________________     Anticoagulation Episode Summary     Current INR goal:  2.0-3.0   TTR:  82.2 % (11.6 mo)   Target end date:  Indefinite   Send INR reminders to:  [facility] HOME MONITOR[facility]    Indications    Persistent Atrial Fibrillation [I48.91]  Persistent atrial fibrillation (H) [I48.19]           Comments:  Home monitor ( [employer] )managed by exception         Anticoagulation Care Providers     Provider Role Specialty Phone number    Erica Hansen APRN CNP Referring Cardiovascular Disease [phone]    Domo Garrett MD Referring Cardiovascular Disease [phone]     Everett Renee MD  Cardiovascular Disease [phone]

## 2023-06-08 NOTE — PROGRESS NOTES
"  ANTICOAGULATION MANAGEMENT     Buck Sinclair 78 year old male is on warfarin with supratherapeutic INR result. (Goal INR 2.0-3.0)    Recent labs: (last 7 days)     06/08/23  0000   INR 3.6*       ASSESSMENT       Source(s): Chart Review and Patient/Caregiver Call       Warfarin doses taken: Warfarin taken as instructed    Diet: Spouse reports \"he's not eating a lot of food, he's not active. he eats a variety\"     Medication/supplement changes: None noted    New illness, injury, or hospitalization: No    Signs or symptoms of bleeding or clotting: No    Previous result: Supratherapeutic    Additional findings: None         PLAN     Recommended plan for no diet, medication or health factor changes affecting INR     Dosing Instructions: hold dose then decrease your warfarin dose (8.3% change) with next INR in 6 days       Summary  As of 6/8/2023    Full warfarin instructions:  6/8: Hold; Otherwise 1.25 mg every Sun, Tue, Fri; 2.5 mg all other days   Next INR check:  6/14/2023             Telephone call with  Neela who verbalizes understanding and agrees to plan    Patient to recheck with home meter    Education provided:     Please call back if any changes to your diet, medications or how you've been taking warfarin    Plan made per [facility] anticoagulation protocol    Desire Cortez RN  Anticoagulation Clinic  6/8/2023    _______________________________________________________________________     Anticoagulation Episode Summary     Current INR goal:  2.0-3.0   TTR:  81.6 % (11.6 mo)   Target end date:  Indefinite   Send INR reminders to:  [facility] HOME MONITOR[facility]    Indications    Persistent Atrial Fibrillation [I48.91]  Persistent atrial fibrillation (H) [I48.19]           Comments:  Home monitor ( [employer] )managed by exception         Anticoagulation Care Providers     Provider Role Specialty Phone number    Erica Hansen APRN CNP Referring Cardiovascular Disease [phone]    Domo Garrett MD Referring " Cardiovascular Disease [phone]    Everett Renee MD  Cardiovascular Disease [phone]

## 2023-06-13 PROBLEM — I50.33 ACUTE ON CHRONIC HEART FAILURE WITH PRESERVED EJECTION FRACTION (H): Status: ACTIVE | Noted: 2022-03-15

## 2023-06-13 PROBLEM — R04.2 HEMOPTYSIS: Status: RESOLVED | Noted: 2017-10-04 | Resolved: 2023-01-01

## 2023-06-13 PROBLEM — I50.9 ACUTE ON CHRONIC CONGESTIVE HEART FAILURE, UNSPECIFIED HEART FAILURE TYPE (H): Status: RESOLVED | Noted: 2023-01-01 | Resolved: 2023-01-01

## 2023-06-13 NOTE — PATIENT INSTRUCTIONS
Buck Sinclair,    It was a pleasure to see you today at the [facility].     My recommendations after this visit include:    - No medications changes made today    -We will follow-up with you once lab results back    - Follow up with Jessica Barrera CNP in 8 weeks in Heart Failure clinic    - Follow up with Dr. Garrett in 4 weeks    - Please call Heart Failure Nurse Line at [phone], if you have any questions or concerns

## 2023-06-13 NOTE — PROGRESS NOTES
Assessment/Recommendations   Assessment:    1.  Acute on chronic heart failure with preserved ejection fraction, NYHA class III: Patient was hospitalized from May 18 through June 1 with shortness of breath secondary to acute with chronic diastolic heart failure, acute hypoxic respiratory failure, and COPD exacerbation.    Echocardiogram done on 5/18/2023 showed preserved LVEF of 55 to 60%, moderate dilated RV, moderate decrease RV systolic function, and severe pulmonary hypertension.    Patient was treated with IV Bumex drip and was transitioned to oral Bumex and furosemide was discontinued.    Admission weight was 272 pounds and discharge weight was 247 pounds.  Current home weight is stable around 242 to 42 pounds.    Follows low salt diet< 2000 mg per day  Maintaining adequate fluid intake at 50 to 64 ounces per day.    We discussed and reviewed about heart failure, medication management, and lifestyle management including low sodium diet <2 g/day, daily weight, and staying physically active as tolerated. Patient declined to meet with the nurse clinician for heart failure education.    2.  Moderate to severe pulmonary hypertension/severe COPD: Patient on 5 L of oxygen per nasal cannula.  Oxygen saturations mid 90s at home per patient.  Echo with bubble study on 5/21/2023 was negative for intra-arterial shunt.    Patient is scheduled to see pulmonary next week.    3.  Coronary artery disease with status post PCI/CANDELARIA x3 to proximal to distal LAD in January 2022: Patient was restarted on aspirin.  On atorvastatin.  On as needed nitroglycerin.    4.  Acute on chronic kidney disease stage III: BMP on 6/7/2023 showed sodium 142, potassium 3.8, BUN 46.1, and creatinine 1.81    5.  Persistent atrial fibrillation: On sotalol 120 mg daily.  Dose was reduced in the hospital due to worsening renal function with prolonged QT/QTc.    Most recent INR supratherapeutic last week.  Denies bleeding  complications.    Plan/Recommendation:  -BMP pending.  -Continue low-sodium less than 2 g/day, daily weight monitoring, and maintain adequate fluid intake  -Follow-up with pulmonary as scheduled    Follow up with Dr. Garrett in 4 weeks.  Follow-up with Jessica Barrera CNP in 8 weeks     History of Present Illness/Subjective    Mr. Buck Sinclair is a 78 year old male with a past medical history of hypertension, persistent atrial fibrillation, coronary disease, COPD, with recent hospitalization of acute on chronic hypoxic respiratory failure, severe COPD, moderate to severe pulm hypertension, and acute on chronic heart failure with preserved ejection fraction who is seen at [facility] for post hospitalization heart failure follow-up.    Today, Salvador is here accompanied by his wife.  He reports feeling improvement in his shortness of breath since recent hospitalization.  He also noted improvement in his leg swelling.  He has lost about 20+ pounds in the hospital.  He lost another 5 pounds since he was discharged from the hospital.  He denies lightheadedness, shortness of breath, orthopnea, PND, palpitations, chest pain and abdominal fullness/bloating.  He denies bleeding complications except bruising on arms.    Wife cooks from the scratch.  She has been helping him keep his sodium intake less than 2 g a day.  Patient saw PCP recently and was asked to increase fluid intake due to worsening renal function.    ECHO from 5/31/23Reviewed:   Interpretation Summary     Limited views were obtained.     1. The left ventricle is normal in size. Left ventricular function is  normal.The ejection fraction is 55-60%.  2. The right ventricle is mildly dilated.Moderately decreased right  ventricular systolic function  3. The left atrium is moderate to severely dilated. The right atrium is  severely dilated.  4. A contrast injection (Bubble Study) was performed that was negative for  flow  "across the interatrial septum. There is no color Doppler evidence of an  atrial shunt.     Limited views were obtained.     Physical Examination Review of Systems   /77 (BP Location: Left arm, Patient Position: Sitting, Cuff Size: Adult Large)   Pulse 82   Resp 18   Ht 1.905 m (6' 3\")   Wt 114.8 kg (253 lb)   SpO2 95%   BMI 31.62 kg/m    Body mass index is 31.62 kg/m .  Wt Readings from Last 3 Encounters:   06/13/23 114.8 kg (253 lb)   06/01/23 110.1 kg (242 lb 11.2 oz)   11/16/22 123.3 kg (271 lb 14.4 oz)     General Appearance:   no distress, normal body habitus   ENT/Mouth: membranes moist, no oral lesions or bleeding gums.      EYES:  no scleral icterus, normal conjunctivae   Neck: no carotid bruits or thyromegaly   Chest/Lungs:   lungs are clear to auscultation, no rales or wheezing, equal chest wall expansion    Cardiovascular:    Normal first and second heart sounds with no murmurs, rubs, or gallops; the carotid, radial and posterior tibial pulses are intact, JVP is difficult to assess due to the patient's obesity and body habitus   , trace edema bilaterally    Abdomen:  no organomegaly, masses, bruits, or tenderness; bowel sounds are present   Extremities   no cyanosis or clubbing   Radial pulses and Pedal pulses intact and symmetrical.  CMS intact.   Skin: no xanthelasma, warm.    Neurologic: normal  bilateral, no tremors     Psychiatric: alert and oriented x3, calm                                                        Negative unless noted in HPI     Medical History  Surgical History Family History Social History   Past Medical History:   Diagnosis Date     Anemia      Asthma without status asthmaticus 5/5/2021     BPH (benign prostatic hyperplasia)      Cardiomyopathy (H)      COPD, group B, by GOLD 2017 classification (H)      Coronary artery disease due to calcified coronary lesion      Dyslipidemia, goal LDL below 70      Essential hypertension      History of transfusion      " Persistent atrial fibrillation (H)      Pneumonia of left lower lobe due to infectious organism 10/4/2017     Skin cancer of trunk      Status post catheter ablation of atrial fibrillation 6/7/2017    PVI 4-2011 (Cryo/PVI + roof line + CTI line) Re-do PVI 7-2011 (RFA/PVI + CFE + VIDYA + confirmed CTI line)     Ventricular tachycardia (H)     Past Surgical History:   Procedure Laterality Date     CARDIAC DEFIBRILLATOR PLACEMENT       CARDIOVERSION  07/11/2018    x20, last 2/12/15, 10/2015, 11/18/16, 6/16/17 by Lauren Foster CNP     CARDIOVERSION  07/11/2018     CARDIOVERSION  11/19/2021     COLONOSCOPY N/A 4/28/2017    Procedure: COLONOSCOPY with 2 ascending polyps and 1 transverse polyp;  Surgeon: Jose Whittington MD;  Location: [facility];  Service:      CV CORONARY ANGIOGRAM N/A 9/20/2017    Procedure: Coronary Angiogram;  Surgeon: Sergio Cervantes MD;  Location: [facility] Cath Lab;  Service:      CV CORONARY ANGIOGRAM N/A 1/24/2022    Procedure: Coronary Angiogram;  Surgeon: Christi Saunders MD;  Location: [facility] CATH LAB CV     CV LEFT HEART CATH N/A 1/24/2022    Procedure: Left Heart Cath;  Surgeon: Christi Saunders MD;  Location: [facility] LAB CV     CV RIGHT AND LEFT HEART CATH N/A 1/24/2022    Procedure: Right and Left Heart Catherization;  Surgeon: Christi Saunders MD;  Location: [facility] CV     EP ICD GENERATOR REPLACEMENT DUAL N/A 10/28/2022    Procedure: Implantable Cardioverter Defibrillator Generator Replacement Dual;  Surgeon: Elo Collins MD;  Location: [facility] CATH LAB CV     EP ICD INSERT       FRACTURE SURGERY Left     wrist     INGUINAL HERNIA REPAIR Left 1967    while in the [employer] in [place] after 13 month in [place]     INSERT / REPLACE / REMOVE PACEMAKER       IR MISCELLANEOUS PROCEDURE  4/30/2014     OTHER SURGICAL HISTORY      left hand surgery---tendon repair     NH ABLATE HEART DYSRHYTHM FOCUS  04/2011    Catheter Ablation Atrial Fibrillation PVI Apr 2011  (Cryo+RF-PVI + roof line + CTI line)     ID ABLATE HEART DYSRHYTHM FOCUS  2011    Re-do PVI 2011 (RFA-PVI + CFE + VIDYA + confirmation of CTI line)     TOTAL SHOULDER REPLACEMENT Right 2016    Dr. Abernathy of [facility]     WRIST SURGERY Left      ZZC MYERS W/O FACETEC FORAMOT/DSKC  VRT SEG, CERVICAL      Laminectomy Lumbar;  Recorded: 2012;    Family History   Problem Relation Age of Onset     Cancer Mother         leukemia     Cancer Father         bladder     Cancer Sister         breast with lung met.     Aneurysm Sister      CABG Brother      CABG Brother      Valvular heart disease Brother         valve replacement    Social History     Socioeconomic History     Marital status:      Spouse name: Not on file     Number of children: Not on file     Years of education: Not on file     Highest education level: Not on file   Occupational History     Not on file   Tobacco Use     Smoking status: Former     Packs/day: 1.00     Years: 4.00     Pack years: 4.00     Types: Cigarettes     Quit date: 1968     Years since quittin.4     Smokeless tobacco: Never   Vaping Use     Vaping status: Never Used   Substance and Sexual Activity     Alcohol use: Yes     Alcohol/week: 2.0 standard drinks of alcohol     Comment: Alcoholic Drinks/day: 1 beer per week     Drug use: No     Sexual activity: Yes     Partners: Female     Birth control/protection: Post-menopausal   Other Topics Concern     Parent/sibling w/ CABG, MI or angioplasty before 65F 55M? Not Asked   Social History Narrative    Preloaded 2013     Social Determinants of Health     Financial Resource Strain: Not on file   Food Insecurity: Not on file   Transportation Needs: Not on file   Physical Activity: Not on file   Stress: Not on file   Social Connections: Not on file   Intimate Partner Violence: Not on file   Housing Stability: Not on file          Medications  Allergies   Current Outpatient Medications    Medication Sig Dispense Refill     acetaminophen (TYLENOL) 325 MG tablet Take 650 mg by mouth 2 times daily as needed       albuterol (PROAIR HFA/PROVENTIL HFA/VENTOLIN HFA) 108 (90 Base) MCG/ACT inhaler Inhale 2 puffs into the lungs every 4 hours as needed for shortness of breath / dyspnea or wheezing       Ascorbic Acid (VITAMIN C) 500 MG CAPS Take 1,000 mg by mouth daily       aspirin (ASA) 81 MG EC tablet Take 1 tablet (81 mg) by mouth daily 90 tablet 0     atorvastatin (LIPITOR) 40 MG tablet Take 40 mg by mouth every evening       budesonide-formoterol (SYMBICORT) 160-4.5 MCG/ACT Inhaler Inhale 2 puffs into the lungs 2 times daily       bumetanide (BUMEX) 2 MG tablet Take 1 tablet (2 mg) by mouth 2 times daily for 90 days 180 tablet 0     co-enzyme Q-10 100 MG CAPS capsule Take 2 capsules (200 mg) by mouth daily 2 capsule      fish oil-omega-3 fatty acids 1000 MG capsule Take 1 g by mouth 2 times daily       FLUoxetine (PROZAC) 20 MG capsule TAKE 1 CAPSULE BY MOUTH EVERY DAY       MAG64 64 MG TBEC CR tablet TAKE 2 TABLETS BY MOUTH EVERY DAY (Patient taking differently: Take 2 tablets by mouth every evening With food) 180 tablet 1     multivitamin, therapeutic (THERA-VIT) TABS tablet Take 1 tablet by mouth daily       OXYGEN-HELIUM IN 4-5 L        polyethylene glycol (MIRALAX) 17 GM/Dose powder Take 17 g by mouth daily        sotalol (BETAPACE) 120 MG tablet Take 1 tablet (120 mg) by mouth every 24 hours for 90 days 90 tablet 0     tiotropium (SPIRIVA) 18 MCG inhaled capsule Inhale 18 mcg into the lungs daily       vitamin D2 (ERGOCALCIFEROL) 43937 units (1250 mcg) capsule Take 50,000 Units by mouth twice a week On Sunday and Wednesday       warfarin ANTICOAGULANT (COUMADIN) 2.5 MG tablet Take 2.5 mg daily and recheck INR in 2-3 days 116 tablet 1     nitroGLYcerin (NITROSTAT) 0.4 MG sublingual tablet One tablet under the tongue every 5 minutes if needed for chest pain. May repeat every 5 minutes for a maximum  "of 3 doses in 15 minutes\" (Patient not taking: Reported on 6/13/2023) 25 tablet 3    Allergies   Allergen Reactions     Adhesive Tape Other (See Comments)     ADHESIVE TAPE; SKIN IRRITATION; Skin pulled off with foam tape       Amiodarone      ADVERSE REACTION.  Sunlight sensitivity.     Lisinopril          Lab Results    Chemistry/lipid CBC Cardiac Enzymes/BNP/TSH/INR   Lab Results   Component Value Date    CHOL 109 10/06/2022    HDL 58 10/06/2022    TRIG 39 10/06/2022    BUN 46.1 (H) 06/07/2023     06/07/2023    CO2 22 06/07/2023    Lab Results   Component Value Date    WBC 12.6 (H) 06/01/2023    HGB 11.7 (L) 06/01/2023    HCT 36.2 (L) 06/01/2023     06/01/2023     (L) 06/01/2023    Lab Results   Component Value Date     (H) 12/01/2020    TSH 2.38 05/18/2023    INR 3.6 (H) 06/08/2023        55 minutes spent on the date of encounter doing chart review, review of test results, interpretation with above tests, patient visit, documentation and discussion with family.        This note has been dictated using voice recognition software. Any grammatical, typographical, or context distortions are unintentional and inherent to the software        "

## 2023-06-13 NOTE — LETTER
6/13/2023    Vivek Guerrero MD  [address]  [facility] [address]    RE: Buck Sinclair       Dear Colleague,     I had the pleasure of seeing Buck Sinclair in the [facility].          Assessment/Recommendations   Assessment:    1.  Acute on chronic heart failure with preserved ejection fraction, NYHA class III: Patient was hospitalized from May 18 through June 1 with shortness of breath secondary to acute with chronic diastolic heart failure, acute hypoxic respiratory failure, and COPD exacerbation.    Echocardiogram done on 5/18/2023 showed preserved LVEF of 55 to 60%, moderate dilated RV, moderate decrease RV systolic function, and severe pulmonary hypertension.    Patient was treated with IV Bumex drip and was transitioned to oral Bumex and furosemide was discontinued.    Admission weight was 272 pounds and discharge weight was 247 pounds.  Current home weight is stable around 242 to 42 pounds.    Follows low salt diet< 2000 mg per day  Maintaining adequate fluid intake at 50 to 64 ounces per day.    We discussed and reviewed about heart failure, medication management, and lifestyle management including low sodium diet <2 g/day, daily weight, and staying physically active as tolerated. Patient declined to meet with the nurse clinician for heart failure education.    2.  Moderate to severe pulmonary hypertension/severe COPD: Patient on 5 L of oxygen per nasal cannula.  Oxygen saturations mid 90s at home per patient.  Echo with bubble study on 5/21/2023 was negative for intra-arterial shunt.    Patient is scheduled to see pulmonary next week.    3.  Coronary artery disease with status post PCI/CANDELARIA x3 to proximal to distal LAD in January 2022: Patient was restarted on aspirin.  On atorvastatin.  On as needed nitroglycerin.    4.  Acute on chronic kidney disease stage III: BMP on 6/7/2023 showed sodium 142, potassium 3.8, BUN 46.1, and creatinine 1.81    5.  Persistent atrial  fibrillation: On sotalol 120 mg daily.  Dose was reduced in the hospital due to worsening renal function with prolonged QT/QTc.    Most recent INR supratherapeutic last week.  Denies bleeding complications.    Plan/Recommendation:  -BMP pending.  -Continue low-sodium less than 2 g/day, daily weight monitoring, and maintain adequate fluid intake  -Follow-up with pulmonary as scheduled    Follow up with Dr. Garrett in 4 weeks.  Follow-up with Jessica Barrera CNP in 8 weeks     History of Present Illness/Subjective    Mr. Buck Sinclair is a 78 year old male with a past medical history of hypertension, persistent atrial fibrillation, coronary disease, COPD, with recent hospitalization of acute on chronic hypoxic respiratory failure, severe COPD, moderate to severe pulm hypertension, and acute on chronic heart failure with preserved ejection fraction who is seen at [facility] for post hospitalization heart failure follow-up.    Today, Salvador is here accompanied by his wife.  He reports feeling improvement in his shortness of breath since recent hospitalization.  He also noted improvement in his leg swelling.  He has lost about 20+ pounds in the hospital.  He lost another 5 pounds since he was discharged from the hospital.  He denies lightheadedness, shortness of breath, orthopnea, PND, palpitations, chest pain and abdominal fullness/bloating.  He denies bleeding complications except bruising on arms.    Wife cooks from the scratch.  She has been helping him keep his sodium intake less than 2 g a day.  Patient saw PCP recently and was asked to increase fluid intake due to worsening renal function.    ECHO from 5/31/23Reviewed:   Interpretation Summary     Limited views were obtained.     1. The left ventricle is normal in size. Left ventricular function is  normal.The ejection fraction is 55-60%.  2. The right ventricle is mildly dilated.Moderately decreased right  ventricular  "systolic function  3. The left atrium is moderate to severely dilated. The right atrium is  severely dilated.  4. A contrast injection (Bubble Study) was performed that was negative for  flow across the interatrial septum. There is no color Doppler evidence of an  atrial shunt.     Limited views were obtained.     Physical Examination Review of Systems   /77 (BP Location: Left arm, Patient Position: Sitting, Cuff Size: Adult Large)   Pulse 82   Resp 18   Ht 1.905 m (6' 3\")   Wt 114.8 kg (253 lb)   SpO2 95%   BMI 31.62 kg/m    Body mass index is 31.62 kg/m .  Wt Readings from Last 3 Encounters:   06/13/23 114.8 kg (253 lb)   06/01/23 110.1 kg (242 lb 11.2 oz)   11/16/22 123.3 kg (271 lb 14.4 oz)     General Appearance:   no distress, normal body habitus   ENT/Mouth: membranes moist, no oral lesions or bleeding gums.      EYES:  no scleral icterus, normal conjunctivae   Neck: no carotid bruits or thyromegaly   Chest/Lungs:   lungs are clear to auscultation, no rales or wheezing, equal chest wall expansion    Cardiovascular:    Normal first and second heart sounds with no murmurs, rubs, or gallops; the carotid, radial and posterior tibial pulses are intact, JVP is difficult to assess due to the patient's obesity and body habitus   , trace edema bilaterally    Abdomen:  no organomegaly, masses, bruits, or tenderness; bowel sounds are present   Extremities   no cyanosis or clubbing   Radial pulses and Pedal pulses intact and symmetrical.  CMS intact.   Skin: no xanthelasma, warm.    Neurologic: normal  bilateral, no tremors     Psychiatric: alert and oriented x3, calm                                                        Negative unless noted in HPI     Medical History  Surgical History Family History Social History   Past Medical History:   Diagnosis Date    Anemia     Asthma without status asthmaticus 5/5/2021    BPH (benign prostatic hyperplasia)     Cardiomyopathy (H)     COPD, group B, by GOLD 2017 " classification (H)     Coronary artery disease due to calcified coronary lesion     Dyslipidemia, goal LDL below 70     Essential hypertension     History of transfusion     Persistent atrial fibrillation (H)     Pneumonia of left lower lobe due to infectious organism 10/4/2017    Skin cancer of trunk     Status post catheter ablation of atrial fibrillation 6/7/2017    PVI 4-2011 (Cryo/PVI + roof line + CTI line) Re-do PVI 7-2011 (RFA/PVI + CFE + VIDYA + confirmed CTI line)    Ventricular tachycardia (H)     Past Surgical History:   Procedure Laterality Date    CARDIAC DEFIBRILLATOR PLACEMENT      CARDIOVERSION  07/11/2018    x20, last 2/12/15, 10/2015, 11/18/16, 6/16/17 by Lauren Foster CNP    CARDIOVERSION  07/11/2018    CARDIOVERSION  11/19/2021    COLONOSCOPY N/A 4/28/2017    Procedure: COLONOSCOPY with 2 ascending polyps and 1 transverse polyp;  Surgeon: Jose Whittington MD;  Location: [facility];  Service:     CV CORONARY ANGIOGRAM N/A 9/20/2017    Procedure: Coronary Angiogram;  Surgeon: Sergio Cervantes MD;  Location: [facility];  Service:     CV CORONARY ANGIOGRAM N/A 1/24/2022    Procedure: Coronary Angiogram;  Surgeon: Christi Saunders MD;  Location: [facility] CATH LAB CV    CV LEFT HEART CATH N/A 1/24/2022    Procedure: Left Heart Cath;  Surgeon: Christi Saunders MD;  Location: [facility] LAB CV    CV RIGHT AND LEFT HEART CATH N/A 1/24/2022    Procedure: Right and Left Heart Catherization;  Surgeon: Christi Saunders MD;  Location: [facility] LAB CV    EP ICD GENERATOR REPLACEMENT DUAL N/A 10/28/2022    Procedure: Implantable Cardioverter Defibrillator Generator Replacement Dual;  Surgeon: Elo Collins MD;  Location: [facility] CATH LAB CV    EP ICD INSERT      FRACTURE SURGERY Left     wrist    INGUINAL HERNIA REPAIR Left 1967    while in the [employer] in [place] after 13 month in [place]    INSERT / REPLACE / REMOVE PACEMAKER      IR MISCELLANEOUS PROCEDURE  4/30/2014    OTHER SURGICAL  HISTORY      left hand surgery---tendon repair    [place] ABLATE HEART DYSRHYTHM FOCUS  2011    Catheter Ablation Atrial Fibrillation PVI 2011 (Cryo+RF-PVI + roof line + CTI line)    [place] ABLATE HEART DYSRHYTHM FOCUS  2011    Re-do PVI 2011 (RFA-PVI + CFE + VIDYA + confirmation of CTI line)    TOTAL SHOULDER REPLACEMENT Right 2016    Dr. Abernathy of [facility]    WRIST SURGERY Left     ZZC MYERS W/O FACETEC FORAMOT/DSKC  VRT SEG, CERVICAL      Laminectomy Lumbar;  Recorded: 2012;    Family History   Problem Relation Age of Onset    Cancer Mother         leukemia    Cancer Father         bladder    Cancer Sister         breast with lung met.    Aneurysm Sister     CABG Brother     CABG Brother     Valvular heart disease Brother         valve replacement    Social History     Socioeconomic History    Marital status:      Spouse name: Not on file    Number of children: Not on file    Years of education: Not on file    Highest education level: Not on file   Occupational History    Not on file   Tobacco Use    Smoking status: Former     Packs/day: 1.00     Years: 4.00     Pack years: 4.00     Types: Cigarettes     Quit date: 1968     Years since quittin.4    Smokeless tobacco: Never   Vaping Use    Vaping status: Never Used   Substance and Sexual Activity    Alcohol use: Yes     Alcohol/week: 2.0 standard drinks of alcohol     Comment: Alcoholic Drinks/day: 1 beer per week    Drug use: No    Sexual activity: Yes     Partners: Female     Birth control/protection: Post-menopausal   Other Topics Concern    Parent/sibling w/ CABG, MI or angioplasty before 65F 55M? Not Asked   Social History Narrative    Preloaded 2013     Social Determinants of Health     Financial Resource Strain: Not on file   Food Insecurity: Not on file   Transportation Needs: Not on file   Physical Activity: Not on file   Stress: Not on file   Social Connections: Not on file   Intimate Partner  Violence: Not on file   Housing Stability: Not on file          Medications  Allergies   Current Outpatient Medications   Medication Sig Dispense Refill    acetaminophen (TYLENOL) 325 MG tablet Take 650 mg by mouth 2 times daily as needed      albuterol (PROAIR HFA/PROVENTIL HFA/VENTOLIN HFA) 108 (90 Base) MCG/ACT inhaler Inhale 2 puffs into the lungs every 4 hours as needed for shortness of breath / dyspnea or wheezing      Ascorbic Acid (VITAMIN C) 500 MG CAPS Take 1,000 mg by mouth daily      aspirin (ASA) 81 MG EC tablet Take 1 tablet (81 mg) by mouth daily 90 tablet 0    atorvastatin (LIPITOR) 40 MG tablet Take 40 mg by mouth every evening      budesonide-formoterol (SYMBICORT) 160-4.5 MCG/ACT Inhaler Inhale 2 puffs into the lungs 2 times daily      bumetanide (BUMEX) 2 MG tablet Take 1 tablet (2 mg) by mouth 2 times daily for 90 days 180 tablet 0    co-enzyme Q-10 100 MG CAPS capsule Take 2 capsules (200 mg) by mouth daily 2 capsule     fish oil-omega-3 fatty acids 1000 MG capsule Take 1 g by mouth 2 times daily      FLUoxetine (PROZAC) 20 MG capsule TAKE 1 CAPSULE BY MOUTH EVERY DAY      MAG64 64 MG TBEC CR tablet TAKE 2 TABLETS BY MOUTH EVERY DAY (Patient taking differently: Take 2 tablets by mouth every evening With food) 180 tablet 1    multivitamin, therapeutic (THERA-VIT) TABS tablet Take 1 tablet by mouth daily      OXYGEN-HELIUM IN 4-5 L       polyethylene glycol (MIRALAX) 17 GM/Dose powder Take 17 g by mouth daily       sotalol (BETAPACE) 120 MG tablet Take 1 tablet (120 mg) by mouth every 24 hours for 90 days 90 tablet 0    tiotropium (SPIRIVA) 18 MCG inhaled capsule Inhale 18 mcg into the lungs daily      vitamin D2 (ERGOCALCIFEROL) 13292 units (1250 mcg) capsule Take 50,000 Units by mouth twice a week On Sunday and Wednesday      warfarin ANTICOAGULANT (COUMADIN) 2.5 MG tablet Take 2.5 mg daily and recheck INR in 2-3 days 116 tablet 1    nitroGLYcerin (NITROSTAT) 0.4 MG sublingual tablet One tablet  "under the tongue every 5 minutes if needed for chest pain. May repeat every 5 minutes for a maximum of 3 doses in 15 minutes\" (Patient not taking: Reported on 6/13/2023) 25 tablet 3    Allergies   Allergen Reactions    Adhesive Tape Other (See Comments)     ADHESIVE TAPE; SKIN IRRITATION; Skin pulled off with foam tape      Amiodarone      ADVERSE REACTION.  Sunlight sensitivity.    Lisinopril          Lab Results    Chemistry/lipid CBC Cardiac Enzymes/BNP/TSH/INR   Lab Results   Component Value Date    CHOL 109 10/06/2022    HDL 58 10/06/2022    TRIG 39 10/06/2022    BUN 46.1 (H) 06/07/2023     06/07/2023    CO2 22 06/07/2023    Lab Results   Component Value Date    WBC 12.6 (H) 06/01/2023    HGB 11.7 (L) 06/01/2023    HCT 36.2 (L) 06/01/2023     06/01/2023     (L) 06/01/2023    Lab Results   Component Value Date     (H) 12/01/2020    TSH 2.38 05/18/2023    INR 3.6 (H) 06/08/2023        55 minutes spent on the date of encounter doing chart review, review of test results, interpretation with above tests, patient visit, documentation and discussion with family.        This note has been dictated using voice recognition software. Any grammatical, typographical, or context distortions are unintentional and inherent to the software            Thank you for allowing me to participate in the care of your patient.      Sincerely,     GIBRAN Cardenas [facility]  cc:   Deon Iglesias MD  [address] [facility] [address]  [place] [address]        "

## 2023-06-14 NOTE — PROGRESS NOTES
Pulmonary Clinic Follow-up Visit  Jessica 15, 2023      Assessment/Plan:  78 year old male with a history of tobacco dependence in remission, CAD s/p PCI/stents, afib s/p PVL with ICD/PPM on anticoagulation, history of cavitary lesion and extensive pneumonia in LLL in Oct 2017, subsequent recurrent LLL pneumonia, since resolved, presenting for hospital follow up. Repeat PFT's in 2020 actually showed improved FEV1/FVC ratio, stable FEV1 but substantial worsening of the DLco (almost 40% lower than in 2018). Last PFTs in 10/2022 showed a stable FEV1 but DLco is again decreased at 34% when not corrected for hemoglobin.   Recent hospitalization for volume overload, acute hypoxic respiratory failure, and COPD exacerbation.   Diuretics managed by Dr. Garrett, recent changed from furosemide to Bumex.      Recent RHC data showed mPAP of 37 mm Hg, normal PCWP, CO 5.7 L/min, TPG of 21 and PVR of 4.7 BLAIR. Echocardiogram done on 5/18/2023 showed preserved LVEF of 55 to 60%, moderate dilated RV, moderate decrease RV systolic function, and severe pulmonary hypertension. Echo with bubble study on 5/21/2023 was negative for intra-arterial shunt. He is established at the [facility]'s pulmonary hypertension clinic.      Plan:  #COPD, GOLD class E (CAT >10, recent hospitalization). Minimal smoking history so this is probably due to his work as a . PFTs 2017 showed mild obstruction and normal DLco. Mild exertional hypoxemia noted on 6MWT in 2019. Now on supplemental oxygen as of 2020.  - continue budesonide-formoterol 160-4.5 mcg two inhalations BID with spacer; rinse/gargle/spit water after use. No dose changes today.  - continue tiotropium HandiHaler one inhalation daily  - Cont albuterol as needed  - encouraged exercise, weight loss as able  - continue continuous O2 for goal O2 sat 88-92%   - declines pulmonary rehab  - no indication for LDCT as his smoking history is too minimal.   - UTD w/ pneumonia and flu, his is not sure if he  wants the covid-19 booster. He was encouraged to get this.     #exertional hypoxemia, worsening DLco in 2021, significant LIMA: likely due to pulm HTN. RHC studies as above c/w group 1 PH. Echo 05/2023 shows severe PH. Likely due to combination of left-sided heart disease as well as hypoxemic lung disease (emphysema). Significant worsening of DLco over the last 2 years. He is oxygen dependent.  - continue to [facility] up [facility] (Dr. Jose Morales, last seen in March 2022).    - continue bumex per Dr. Garrett in the heart failure clinic   - CHF precautions reinforced.   - continue oxygen as above     #LLL nodule: decreasing in size on 3 month f/u scan after abx (persistent since 2019).   - no further imaging needed unless recurrent symptoms  - continued scans at the [facility] clinic show this remains stable.      #nocturnal hypoxemia:  - wearing oxygen at night.   - previous sleep study in 2015 showed AHI of 1.4. The patient is not sure he wants to repeat this, he doesn't want to wear a CPAP.      Follow up in 3 months with Dr. Tata Slaughter, CNP  Pulmonary Medicine  [facility]   [phone]      CCx: follow up of COPD GOLD class D, chronic hypoxemic respiratory failure requiring supplemental oxygen, CHF with volume overload.  No chief complaint on file.      HPI:   Last seen in clinic in 11/2022. He was in clinic for a scheduled follow up with Dr. Payton on 5/18/2023 but was transported to the ED before completing his visit as he was cyanotic, tachypneic, and had a low SpO2 of 70.   Hospitalized from 5/18-6/1/23 with shortness of breath secondary to acute with chronic diastolic heart failure, acute hypoxic respiratory failure, and COPD exacerbation. Initially treated with IV Bumex drip and was transitioned to oral Bumex at discharge, furosemide was discontinued.  He has lost about 20+ pounds in the hospital. 243lb today at home, he has had a few pound gain over the past few weeks, this has been  gradual with no worsening in his breathing. He presents with his wife today who has been caring for him at home.     He is using his oxygen continuously, needing 5L continuous now. Using all inhaled medications as prescribed.   SpO2 91% on 6L in clinic today.   Walked about 50 ft at home with PT and felt winded. His oxygen will still drop to the 80's while on 5L but rebounds quickly with rest.   Being followed for pulmonary HTN at the . He was in a study for inhaled Tyvaso. This was discontinued per patient. He thinks his breathing is better. His wife is thinks his voice quality has improved since being in the study.  He feels he is almost back to his baseline, some residual fatigue and he is easily winded with activity. SOB is the same, he is able to tolerate minimal activity.   Minimal LE edema.   Denies cough, wheeze, or chest tightness.     Patient supplied answers from flow sheet for:  COPD Assessment Test (CAT)  2009 [employer]. All rights reserved.      5/10/2021    11:00 AM 11/12/2021     8:00 AM 12/17/2021     9:00 AM 2/10/2022     4:00 PM 2/13/2022     8:42 AM 5/6/2022     8:00 AM 11/16/2022     8:00 AM   COPD assessment test (CAT)   Cough 3 3 3 3 2 4 2   Phlegm 3 3 3 3 2 3 2   Chest tightness 3 1 2 2 1 3 2   Walk up hill 5 5 5 5 4 5 5   Limited activities 4 5 3 4 2 3 3   Leaving my home 1 1 0 1 0 0 0   Sleep 1 1 0 0 0 0 0   Energy 5 4 4 4 4 4 3   Total Score 25 23 20 22 15 22 17      ROS:  A 10-system review was obtained and was negative with the exception of the symptoms endorsed in the history of present illness.    PMH:  Past Medical History:   Diagnosis Date     Anemia      Asthma without status asthmaticus 5/5/2021     BPH (benign prostatic hyperplasia)      Cardiomyopathy (H)      COPD, group B, by GOLD 2017 classification (H)      Coronary artery disease due to calcified coronary lesion      Dyslipidemia, goal LDL below 70      Essential hypertension      Hemoptysis 10/4/2017     History of transfusion       Persistent atrial fibrillation (H)      Pneumonia of left lower lobe due to infectious organism 10/4/2017     Skin cancer of trunk      Status post catheter ablation of atrial fibrillation 6/7/2017    PVI 4-2011 (Cryo/PVI + roof line + CTI line) Re-do PVI 7-2011 (RFA/PVI + CFE + VIDYA + confirmed CTI line)     Ventricular tachycardia (H)        PSH:  Past Surgical History:   Procedure Laterality Date     CARDIAC DEFIBRILLATOR PLACEMENT       CARDIOVERSION  07/11/2018    x20, last 2/12/15, 10/2015, 11/18/16, 6/16/17 by Lauren Foster CNP     CARDIOVERSION  07/11/2018     CARDIOVERSION  11/19/2021     COLONOSCOPY N/A 4/28/2017    Procedure: COLONOSCOPY with 2 ascending polyps and 1 transverse polyp;  Surgeon: Jose Whittington MD;  Location: [facility];  Service:      CV CORONARY ANGIOGRAM N/A 9/20/2017    Procedure: Coronary Angiogram;  Surgeon: Sergio Cervantes MD;  Location: [facility] Cath Lab;  Service:      CV CORONARY ANGIOGRAM N/A 1/24/2022    Procedure: Coronary Angiogram;  Surgeon: Christi Saunders MD;  Location: [facility] LAB CV     CV LEFT HEART CATH N/A 1/24/2022    Procedure: Left Heart Cath;  Surgeon: Christi Saunders MD;  Location: [facility] LAB CV     CV RIGHT AND LEFT HEART CATH N/A 1/24/2022    Procedure: Right and Left Heart Catherization;  Surgeon: Christi Saunders MD;  Location: [facility] CV     EP ICD GENERATOR REPLACEMENT DUAL N/A 10/28/2022    Procedure: Implantable Cardioverter Defibrillator Generator Replacement Dual;  Surgeon: Elo Collins MD;  Location: [facility] CATH LAB CV     EP ICD INSERT       FRACTURE SURGERY Left     wrist     INGUINAL HERNIA REPAIR Left 1967    while in the [employer] in [place] after 13 month in [place]     INSERT / REPLACE / REMOVE PACEMAKER       IR MISCELLANEOUS PROCEDURE  4/30/2014     OTHER SURGICAL HISTORY      left hand surgery---tendon repair     NE ABLATE HEART DYSRHYTHM FOCUS  04/2011    Catheter Ablation Atrial Fibrillation PVI Apr   (Cryo+RF-PVI + roof line + CTI line)     NC ABLATE HEART DYSRHYTHM FOCUS  2011    Re-do PVI 2011 (RFA-PVI + CFE + VIDYA + confirmation of CTI line)     TOTAL SHOULDER REPLACEMENT Right 2016    Dr. Abernathy of [facility]     WRIST SURGERY Left      ZZC MYERS W/O FACETEC FORAMOT/DSKC  VRT SEG, CERVICAL      Laminectomy Lumbar;  Recorded: 2012;       Allergies:     Allergies   Allergen Reactions     Adhesive Tape Other (See Comments)     ADHESIVE TAPE; SKIN IRRITATION; Skin pulled off with foam tape       Amiodarone      ADVERSE REACTION.  Sunlight sensitivity.     Lisinopril          Family HX:  Family History   Problem Relation Age of Onset     Cancer Mother         leukemia     Cancer Father         bladder     Cancer Sister         breast with lung met.     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     Comment: [employer]   Social Needs     Financial resource strain: Not on file     Food insecurity     Worry: Not on file     Inability: Not on file     Transportation needs     Medical: Not on file     Non-medical: Not on file   Tobacco Use     Smoking status: Former Smoker     Packs/day: 1.00     Years: 4.00     Pack years: 4.00     Types: Cigarettes     Quit date: 1968     Years since quittin.3     Smokeless tobacco: Never Used   Substance and Sexual Activity     Alcohol use: Yes     Alcohol/week: 2.0 standard drinks     Types: 2 Cans of beer per week     Comment: 1 beer per week     Drug use: No     Sexual activity: Yes     Partners: Female     Birth control/protection: Post-menopausal    Lifestyle     Physical activity     Days per week: Not on file     Minutes per session: Not on file     Stress: Not on file   Relationships     Social connections     Talks on phone: Not on file     Gets together: Not on file     Attends Alevism service: Not on file     Active member of club or organization: Not on file     Attends meetings of clubs or organizations: Not on file     Relationship status: Not on file     Intimate partner violence     Fear of current or ex partner: Not on file     Emotionally abused: Not on file     Physically abused: Not on file     Forced sexual activity: Not on file   Other Topics Concern     Not on file   Social History Narrative     Not on file       Current Meds:  Current Outpatient Medications   Medication     acetaminophen (TYLENOL) 325 MG tablet     albuterol (PROAIR HFA/PROVENTIL HFA/VENTOLIN HFA) 108 (90 Base) MCG/ACT inhaler     Ascorbic Acid (VITAMIN C) 500 MG CAPS     aspirin (ASA) 81 MG EC tablet     atorvastatin (LIPITOR) 40 MG tablet     budesonide-formoterol (SYMBICORT) 160-4.5 MCG/ACT Inhaler     bumetanide (BUMEX) 2 MG tablet     co-enzyme Q-10 100 MG CAPS capsule     fish oil-omega-3 fatty acids 1000 MG capsule     FLUoxetine (PROZAC) 20 MG capsule     MAG64 64 MG TBEC CR tablet     multivitamin, therapeutic (THERA-VIT) TABS tablet     nitroGLYcerin (NITROSTAT) 0.4 MG sublingual tablet     OXYGEN-HELIUM IN     polyethylene glycol (MIRALAX) 17 GM/Dose powder     sotalol (BETAPACE) 120 MG tablet     tiotropium (SPIRIVA) 18 MCG inhaled capsule     vitamin D2 (ERGOCALCIFEROL) 44312 units (1250 mcg) capsule     warfarin ANTICOAGULANT (COUMADIN) 2.5 MG tablet     No current facility-administered medications for this visit.       Physical Exam:  There were no vitals taken for this visit.Gen: alert, oriented, no distress    Physical Exam  Constitutional:       General: He is not in acute distress.     Appearance: He is not ill-appearing or diaphoretic.      Comments:  Walking with a walker, on pulse dose oxygen     HENT:      Nose: Nose normal.   Cardiovascular:      Rate and Rhythm: Normal rate and regular rhythm.      Pulses: Normal pulses.      Heart sounds: Normal heart sounds.   Pulmonary:      Effort: Pulmonary effort is normal. No respiratory distress.      Breath sounds: Normal breath sounds. No wheezing or rhonchi.   Musculoskeletal:      Right lower leg: Edema (mild R>L) present.      Left lower leg: Edema (mil) present.   Skin:     General: Skin is warm and dry.      Findings: No rash.   Neurological:      Mental Status: He is alert.   Psychiatric:         Behavior: Behavior normal.       Labs:  Reviewed  Dec 2018  Chem panel wnl  TSH wnl  hgb 12.1 Oct 2018    Previous testing  DONNA neg  blasto neg  Histo testing neg  c-ANCA neg  HIV neg  RF neg    Bronch/LLL BAL cultures neg    Imaging studies:  CT chest April 2018  IMPRESSION:   CONCLUSION:  1.  Mild scarring and slight bronchiectasis present at both lung bases. No active pneumonia identified.    CT chest 7/15/2019  IMPRESSION:   CONCLUSION:   1.  Nodular opacity of the left lower lobe of interest on 04/17/2019 is less conspicuous today and probably explained by scarring. Additional 6 month chest CT follow-up is recommended.  2.  Emphysema and scattered areas of scarring elsewhere in both lungs.  3.  Advanced multivessel coronary artery disease.    CT chest without contrast 8/17/2022  INDICATION: PERFECT trial.                                                   IMPRESSION: Prominent centrilobular emphysema especially in the upper  lobes with subpleural interstitial lung disease especially in the left  upper lobe. Probable atelectatic opacity in the left lung base not  significantly changed. ICD. Main pulmonary artery enlargement  suggesting possibility of pulmonary artery hypertension.  Atherosclerosis. Cardiomegaly.    CT CHEST W/O CONTRAST, DATE/TIME: 5/19/2023 10:38 AM CDT                                                               IMPRESSION:   1.  Unchanged bibasilar peripheral parenchymal scarring. No acute lung, airway, or pleural space abnormality.  2.  Biatrial and right ventricular heart enlargement. Dilated central pulmonary arteries. Findings are consistent with acute pulmonary hypertension. Calcification in the walls of the left atrium likely from prior ablation procedure.    Echo Dec 2021  Interpretation Summary     1. Left ventricular systolic function is normal. The left ventricle is normal  in size. There is normal left ventricular wall thickness.Left ventricular  diastolic function is normal. No regional wall motion abnormalities noted.  2. The right ventricle is mildly dilated. Mildly decreased right ventricular  systolic functio.  3. There is mild to moderate (1-2+) tricuspid regurgitation.  4. The right ventricular systolic pressure is approximated at 45.7mmHg plus  the right atrial pressure.Right ventricular systolic pressure is elevated,  consistent with moderate pulmonary hypertension. Normal RA pressure of 3 mmHg.  6. The ascending aorta is mildly dilated at 42 mm.    C 1/24/2022    Exertional dyspnea in a patient with known CAD and severe COPD.    Mild coronary artery disease with patent LAD stents.    LVEDP and PCWP are normal. The PA pressure was elevated at 66/24 mmHg with a mean pressure of 37 mmHg. See numbers below.    Shikha and TD cardiac outputs were both 5.7 l/min.    Sats on 2L NC oxygen: Ao 93%, PA 60%, RA 59%        PFTs 2018  FEV1/FVC is 0.56 and is reduced.  FEV1 is 73% predicted and is reduced.  FVC is 96% predicted and normal.  There was no improvement in spirometry after a single inhaled dose of bronchodilator.  TLC is 99% predicted and is normal.  RV is 113% predicted and is normal.  DLCO is 93% predicted and is normal when it   is corrected for hemoglobin.  Impression:  Full Pulmonary Function Test is abnormal.  PFTs are consistent with mild obstructive disease.  Spirometry is not  consistent with reversibility.  There is no hyperinflation.  There is no air-trapping.  Diffusion capacity when corrected for hemoglobin is normal.  Declines in both FEV1 and TLC since 2009 with overall preservation of diffusing capacity.    PFTs 11/10/2021  FEV1 2.42L 68%  FVC 76%  No BD response  Ratio 0.66 (LLN 0.6)  DLco 46% han for hgb  Flow vol curve suggests obstruction.  Impression: no obstruction based on ratio >LLN but FV curve does suggest some obstruction. Significant decline in DLco compared to DLco.     PFTs 10/27/2022  FEV1 1.98L 57%  FVC 3.23L 68%  FEV1/FVC 61%  DLCO not corrected 34%  The FVC and FEV1 are reduced, but the FEV1/FVC ratio is within normal limits.  The FEV1/FEV6 ratio is reduced.  The inspiratory flow rates are within normal limits.  The TLC is reduced.  The diffusing capacity is reduced.  However, the diffusing capacity was not corrected for the patient's hemoglobin.   IMPRESSION:   Mixed Moderately severe airflow obstruction and restriction.   Severe diffusion defect.  The diffusing capacity was not corrected for the patient's hemoglobin.     July 2019  SIX MINUTE WALK TEST / HOME O2 EVALUATION  SpO2 at rest on RA 96%  SpO2 started to drop after 2 1/2 minutes walking. SpO2 after walking 2 1/2 minutes on RA was 88%  SpO2 after walking 6 minutes on RA was 87%  Distance covered 320.04 meters.  Recovery phase, SpO2 after 1 minute rest on RA was 87%  Recovery phase, SpO2 after 2 minute rest on RA was 97%  Impression:   Significant desaturation with activities.   Patient does qualify for O2 supplementation with activity.    Sleep Study 5/10/2015  [place] Sleepiness Score =  17  Mallampati Class =   4  Neck Circumference =   17.75 inches  BMI =      32.9  STOP-BANG Scale =   6/8  Primary Findings:  1- Respiratory monitoring showed intermittent snoring but overall no significant obstructive sleep apnea/hypopnea sufficient to disturb sleep (AHI=1.4).  2- Supine sleep was not sampled during  the study but the patient does not usually sleep in this position due to shoulder problems.   Other Findings:  Sleep Architecture:   - Sleep onset latency was normal.  - REM onset latency was prolonged.  - All stages of sleep were sampled during the test.  Respiration:  - Mean hemoglobin oxygen saturation (SaO2) during the diagnostic portion of the PSG in NREM and REM sleep was 94% with a SaO2 desaturation eri observed to 89%.  - Sleep-related Hypoxemia was not present.  Movements:      - The patient had a Periodic Limb Movement (PLM) index of 146.8 and the MYRNA PLM index was 6.4  Parasomnia:  - No activity consistent with parasomnia was observed in the video recording.  Electrocardiogram:  - Two-lead ECG showed atrial fibrillation with PVCs.  Electroencephalogram:  - We used a limited-montage EEG with F3, F4, C3, C4, O1, O2 and contra-lateral references to M1 and M2 that was reviewed using a 30-second EPOCH. No EEG abnormalities were observed with these settings.  Diagnosis:  -   780.54 Hypersomnia unspecified.  Assessment & Recommendation:   The results from this study are not sufficient to explain the patient s hypersomnia. Measures aimed at reducing upper airway resistance are recommended for the degree of sleep-disordered breathing that was observed in this study. Options include: positional therapy to avoid sleep in the supine posture, ENT evaluation for surgery, and dental evaluation for an oral appliance. Patients with excessive daytime sleepiness are at increased risk for motor vehicle accidents.  Shivam Saul MD  Staff Physician- [facility] Services

## 2023-06-14 NOTE — PROGRESS NOTES
ANTICOAGULATION MANAGEMENT     Buck Sinclair 78 year old male is on warfarin with therapeutic INR result. (Goal INR 2.0-3.0)    Recent labs: (last 7 days)     06/14/23  0000   INR 2.2       ASSESSMENT       Source(s): Chart Review and Patient/Caregiver Call       Warfarin doses taken: Warfarin taken as instructed    Diet: No new diet changes identified    Medication/supplement changes: None noted    New illness, injury, or hospitalization: No    Signs or symptoms of bleeding or clotting: No    Previous result: Supratherapeutic    Additional findings: None         PLAN     Recommended plan for no diet, medication or health factor changes affecting INR     Dosing Instructions: Continue your current warfarin dose with next INR in 1 week       Summary  As of 6/14/2023    Full warfarin instructions:  1.25 mg every Sun, Tue, Fri; 2.5 mg all other days   Next INR check:  6/21/2023             Detailed voice message left for [employer] with dosing instructions and follow up date.   Sent [employer] message with dosing and follow up instructions    Patient to recheck with home meter    Education provided:     Please call back if any changes to your diet, medications or how you've been taking warfarin    Plan made per [facility] anticoagulation protocol    Leana Nayak, RN  Anticoagulation Clinic  6/14/2023    _______________________________________________________________________     Anticoagulation Episode Summary     Current INR goal:  2.0-3.0   TTR:  81.0 % (11.6 mo)   Target end date:  Indefinite   Send INR reminders to:  ANTICOAG HOME MONITORING    Indications    Persistent Atrial Fibrillation (Resolved) [I48.91]  Persistent atrial fibrillation (H) [I48.19]           Comments:  Home monitor ( [employer] )managed by exception         Anticoagulation Care Providers     Provider Role Specialty Phone number    Erica Hansen APRN CNP Referring Cardiovascular Disease [phone]    Domo Garrett MD Referring Cardiovascular  Disease [phone]    Everett Renee MD  Cardiovascular Disease [phone]

## 2023-06-15 NOTE — PATIENT INSTRUCTIONS
- i'm glad you are feeling better since the hospital.   - if you gain more than 3 lbs overnight or have more shortness of breath call Dr. Garrett's clinic.     If you have worsening symptoms, questions, or need to speak with the nurse please call [phone].

## 2023-06-21 NOTE — PROGRESS NOTES
ANTICOAGULATION  MANAGEMENT-Home Monitor Managed by Exception    Buck Sinclair 78 year old male is on warfarin with therapeutic INR result. (Goal INR 2.0-3.0)    Recent labs: (last 7 days)     06/21/23  0000   INR 2.5         Previous INR was Therapeutic    Medication, diet, health changes since last INR:chart reviewed; none identified    Contacted within the last 12 weeks by phone on 6/14/23      LUCAS Jane was NOT contacted regarding therapeutic result today per home monitoring policy manage by exception agreement.   Current warfarin dose is to be continued:     Summary  As of 6/21/2023    Full warfarin instructions:  1.25 mg every Sun, Tue, Fri; 2.5 mg all other days   Next INR check:  7/5/2023           ?   Leana Nayak, RN  Anticoagulation Clinic  6/21/2023    _______________________________________________________________________     Anticoagulation Episode Summary     Current INR goal:  2.0-3.0   TTR:  81.4 % (11.6 mo)   Target end date:  Indefinite   Send INR reminders to:  ANTICOAG HOME MONITORING    Indications    Persistent Atrial Fibrillation (Resolved) [I48.91]  Persistent atrial fibrillation (H) [I48.19]           Comments:  Home monitor ( [employer] )managed by exception         Anticoagulation Care Providers     Provider Role Specialty Phone number    Erica Hansen APRN CNP Referring Cardiovascular Disease [phone]    Domo Garrett MD Referring Cardiovascular Disease [phone]    Everett Renee MD  Cardiovascular Disease [phone]

## 2023-06-22 NOTE — TELEPHONE ENCOUNTER
ANTICOAGULATION MANAGEMENT:  Medication Refill    Anticoagulation Summary  As of 6/21/2023    Warfarin maintenance plan:  1.25 mg (2.5 mg x 0.5) every Sun, Tue, Fri; 2.5 mg (2.5 mg x 1) all other days   Next INR check:  7/5/2023   Target end date:  Indefinite    Indications    Persistent Atrial Fibrillation (Resolved) [I48.91]  Persistent atrial fibrillation (H) [I48.19]             Anticoagulation Care Providers     Provider Role Specialty Phone number    Erica Hansen APRN CNP Referring Cardiovascular Disease [phone]    Domo Garrett MD Referring Cardiovascular Disease [phone]    Everett Renee MD  Cardiovascular Disease [phone]          Visit with referring provider/group within last year: Yes    [facility] referral signed last signed: 10/24/2022; within last year: Yes    Buck meets all criteria for refill (current [facility] referral, office visit with referring provider/group in last year, lab monitoring up to date or not exceeding 2 weeks overdue). Rx instructions and quantity supplied updated to match patient's current dosing plan. Warfarin 90 day supply with 1 refill granted per [facility] protocol     Duc Samuel RN  Anticoagulation Clinic

## 2023-06-27 NOTE — PROGRESS NOTES
ANTICOAGULATION  MANAGEMENT-Home Monitor Managed by Exception    Buck Sinclair 78 year old male is on warfarin with therapeutic INR result. (Goal INR 2.0-3.0)    Recent labs: (last 7 days)     06/27/23  0000   INR 2.4         Previous INR was Therapeutic    Medication, diet, health changes since last INR:chart reviewed; none identified    Contacted within the last 12 weeks by phone on 6/14/23      LUCAS Jane was NOT contacted regarding therapeutic result today per home monitoring policy manage by exception agreement.   Current warfarin dose is to be continued:     Summary  As of 6/27/2023    Full warfarin instructions:  1.25 mg every Sun, Tue, Fri; 2.5 mg all other days   Next INR check:  7/5/2023           ?   Leana Nayak RN  Anticoagulation Clinic  6/27/2023    _______________________________________________________________________     Anticoagulation Episode Summary     Current INR goal:  2.0-3.0   TTR:  82.0 % (11.6 mo)   Target end date:  Indefinite   Send INR reminders to:  ANTICOAG HOME MONITORING    Indications    Persistent Atrial Fibrillation (Resolved) [I48.91]  Persistent atrial fibrillation (H) [I48.19]           Comments:  Home monitor ( [employer] )managed by exception         Anticoagulation Care Providers     Provider Role Specialty Phone number    Erica Hansen APRN CNP Referring Cardiovascular Disease [phone]    Domo Garrett MD Referring Cardiovascular Disease [phone]    Everett Renee MD  Cardiovascular Disease [phone]

## 2023-07-05 NOTE — PROGRESS NOTES
ANTICOAGULATION  MANAGEMENT-Home Monitor Managed by Exception    Buck Sinclair 78 year old male is on warfarin with therapeutic INR result. (Goal INR 2.0-3.0)    Recent labs: (last 7 days)     07/05/23  0000   INR 2.5         Previous INR was Therapeutic    Medication, diet, health changes since last INR:chart reviewed; none identified    Contacted within the last 12 weeks by phone on 6/14/23      LUCAS Jane was NOT contacted regarding therapeutic result today per home monitoring policy manage by exception agreement.   Current warfarin dose is to be continued:     Summary  As of 7/5/2023    Full warfarin instructions:  1.25 mg every Sun, Tue, Fri; 2.5 mg all other days   Next INR check:  7/12/2023           ?   Tran Crews RN  Anticoagulation Clinic  7/5/2023    _______________________________________________________________________     Anticoagulation Episode Summary     Current INR goal:  2.0-3.0   TTR:  82.0 % (11.6 mo)   Target end date:  Indefinite   Send INR reminders to:  ANTICOAG HOME MONITORING    Indications    Persistent Atrial Fibrillation (Resolved) [I48.91]  Persistent atrial fibrillation (H) [I48.19]           Comments:  Home monitor ( [employer] )managed by exception         Anticoagulation Care Providers     Provider Role Specialty Phone number    Erica Hansen APRN CNP Referring Cardiovascular Disease [phone]    Domo Garrett MD Referring Cardiovascular Disease [phone]    Everett Renee MD  Cardiovascular Disease [phone]

## 2023-07-11 NOTE — PROGRESS NOTES
ANTICOAGULATION  MANAGEMENT-Home Monitor Managed by Exception    Buck Sinclair 78 year old male is on warfarin with therapeutic INR result. (Goal INR 2.0-3.0)    Recent labs: (last 7 days)     07/11/23  0000   INR 2.1         Previous INR was Therapeutic    Medication, diet, health changes since last INR:chart reviewed; none identified    Contacted within the last 12 weeks by phone on 6/14/23      LUCAS Jane was NOT contacted regarding therapeutic result today per home monitoring policy manage by exception agreement.   Current warfarin dose is to be continued:     Summary  As of 7/11/2023    Full warfarin instructions:  1.25 mg every Sun, Tue, Fri; 2.5 mg all other days   Next INR check:  7/18/2023           ?   Leana Nayak RN  Anticoagulation Clinic  7/11/2023    _______________________________________________________________________     Anticoagulation Episode Summary     Current INR goal:  2.0-3.0   TTR:  83.7 % (11.6 mo)   Target end date:  Indefinite   Send INR reminders to:  ANTICOAG HOME MONITORING    Indications    Persistent Atrial Fibrillation (Resolved) [I48.91]  Persistent atrial fibrillation (H) [I48.19]           Comments:  Home monitor ( [employer] )managed by exception         Anticoagulation Care Providers     Provider Role Specialty Phone number    Erica Hansen APRN CNP Referring Cardiovascular Disease [phone]    Domo Garrett MD Referring Cardiovascular Disease [phone]    Everett Renee MD  Cardiovascular Disease [phone]

## 2023-07-13 NOTE — LETTER
7/13/2023    Vivek Guerrero MD  [address]  [facility] [address]    RE: Buck Sinclair       Dear Colleague,     I had the pleasure of seeing Buck Sinclair in the [facility].  HEART CARE ENCOUNTER NOTE      Assessment/Recommendations   Assessment:    Chronic congestive heart failure with reduced left ventricular ejection fraction with left ventricular ejection fraction improved to 50% due to nonischemic dilated cardiomyopathy (out of proportion to his degree of coronary artery disease). NYHA class III-IV. Cardiac catheterization on 1/24/2022 showed normal left-sided filling pressures with much of his heart failure likely being right-sided.. He seems euvolemic today.  Coronary artery disease status post drug-eluting stenting x3 to the dtcvdafm-vuvxibp-dwzpdu left anterior descending artery on 9/20/2017. Coronary angiography on 1/24/2022 showed patent stents and no other significant coronary artery disease. Denies angina.  Moderate-to-severe pulmonary hypertension. 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  Medtronic dual-chamber implantable cardiac defibrillator placement on 6/11/2014 and generator change on 10/28/2022 for primary prevention of sudden cardiac death. He has a recently noted increased burden of ventricular pacing felt to be due to frequent mode switching from atrial fibrillation.  History of Medtronic dual-chamber permanent pacemaker placement in 1999 with generator replacement in 2005 and device removal in 2014.  Persistent atrial fibrillation status post multiple electrical cardioversions, most recently on 11/19/2021. He underwent complex catheter ablation x2 in 2011. Recently increased burden of atrial fibrillation. [id number] score is at least 5 (congestive heart failure, hypertension, age 75 or greater, coronary artery disease).  Benign essential hypertension. Normal today.  Hyperlipidemia. Last LDL 43 mg/dL.  Severe chronic obstructive  pulmonary disease on 5 liters of supplemental oxygen at home.  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  Chronic kidney disease stage III.  BMI 32.75.    Plan:  Continue bumetanide 2 mg twice daily.  Start empagliflozin 10 mg daily.  His renal dysfunction may not allow for spironolactone.  Continue reduced dose of sotalol 120 mg twice daily, but if he has increasing episodes of atrial fibrillation and subsequent increased mode switching and ventricular pacing, we may need to increase the dose of sotalol back to 160 mg twice daily to prevent further decompensations of heart failure.  Anticoagulation with warfarin goal INR 2-3.  Losartan currently being held due to low blood pressure and renal dysfunction.  Atorvastatin 40 mg daily.  He has been seen in pulmonology clinic with Dr. Payton and pulmonary hypertension clinic with Dr. Morales.  Follows in device clinic.  He has follow-up in Heart Failure [facility] clinic on 8/17/2023.  Follow-up with me in 6 months.       A total time of 40 minutes was spent on the date of this encounter.    History of Present Illness   Mr. Buck Sinclair is a 78 year old male with a significant past history of  HFrEF due to nonischemic cardiomyopathy (out of proportion to CAD) with LVEF most recently noted to be 50%, persistent AF s/p multiple electrical cardioversions and extensive catheter ablations x2 in 2011, CAD s/p CANDELARIA x3 to the sxureibq-mm-ypymbt LAD, COPD on home O2, Medtronic dual-chamber permanent pacemaker placement in 1999 replaced with a Medtronic dual-chamber ICD on 6/11/2014 and generator replacement 10/28/2022, PHTN, HTN, and HLD presenting for follow-up.    He was hospitalized 5/18/2023 - 6/1/2023 with acute on chronic hypoxia. He was requiring 10L supplemental O2, previously requiring 4-5L. He was felt to have a combination of acute CHF, COPD exacerbation, and possible pneumonia. He was diuresed from a weight of 269 pounds to 242 pounds. He did develop an CHRISSY felt to be due to  overdiuresis, which has since been improving. His sotalol dose was reduced due to prolonged QT and worsened renal function. He was switched from oral furosemide to oral bumetanide. He is currently now on 5L O2.    Weight has been stable at 242-246 pounds since hospital discharge. He still gets hypoxic with minimal exertion. Stable mild lower extremity edema. No chest pain/pressure/tightness, light headedness/dizziness, pre-syncope, syncope, palpitations, paroxysmal nocturnal dyspnea (PND), or orthopnea.     Cardiac Problems and Cardiac Diagnostics     Most Recent Cardiac testing:  ECG dated 5/23/2023 (personaly reviewed and interpreted): A-V paced with PVC     Device check 5/16/2023 (report reviewed):  Device: Medtronic Cobalt.  Pacing %/Programmed:  48% at AAIR<=>DDDR 60/130. Significant increase in  %. Per trend ~100% % pacing/day.   Lead(s): Stable.  Battery longevity: 9yrs, 9mo.  Presenting: AS- and A-V paced 80 bpm.  Atrial arrhythmias: since 2/6/23; 489 mode switch episodes longest lasting 6 minutes, available EGMs show very fine AF with occasional AP noted, ventricular rates controlled, AF burden 4%.   Anticoagulant: warfarin.  Ventricular arrhythmias: since 2/6/23; No VT/VF detected.  Device/Lead alerts: None.  Comments: normal ICD function. Routed to device RN for review.  Plan: Next remote check scheduled for 8/25/23. LINDA Lin, Device Specialist  ADD: Transmission reviewed. Increase in  is most likely due to frequent mode switching and PMOP at 80bpm as suggested by histogram showing  mainly occurring between 80-90bpm. Underlying rhythm at the last in-clinic check was also notably SB with 1st DAVB.     ECHO 5/18/2023 (report reviewed):   Left ventricular size, wall motion and function are normal. The ejection fraction is 55-60%.  The right ventricle is moderately dilated.  Moderately decreased right ventricular systolic function  The left atrium is moderately dilated.  The right atrium is severely  dilated.  There is moderate to mod-severe (2-3+) tricuspid regurgitation.  Severe (>55mmHg) pulmonary hypertension is present.  IVC diameter >2.1 cm collapsing <50% with sniff suggests a high RA pressure estimated at 15 mmHg or greater.     Stress test 11/7/2019 (report reviewed):    The nuclear stress test is abnormal.    There is a small area of nontransmural infarction in the apical segment(s) of the left ventricle.    The left ventricular ejection fraction at stress is 63%.    A prior study was conducted on 7/19/2018.  This study has changes noted when compared with the prior study. The apical defect no longer demonstrates any ischemia.     Cardiac cath 1/24/2022 (report reviewed):   Mild coronary artery disease with patent LAD stents.  LVEDP and PCWP are normal. The PA pressure was elevated at 66/24 mmHg with a mean pressure of 37 mmHg. See numbers below.  Shikha and TD cardiac outputs were both 5.7 l/min.  Sats on 2L NC oxygen: Ao 93%, PA 60%, RA 59%     Medications  Allergies   Current Outpatient Medications   Medication Sig Dispense Refill    acetaminophen (TYLENOL) 325 MG tablet Take 650 mg by mouth 2 times daily as needed      albuterol (PROAIR HFA/PROVENTIL HFA/VENTOLIN HFA) 108 (90 Base) MCG/ACT inhaler Inhale 2 puffs into the lungs every 4 hours as needed for shortness of breath or wheezing 18 g 4    Ascorbic Acid (VITAMIN C) 500 MG CAPS Take 1,000 mg by mouth daily      aspirin (ASA) 81 MG EC tablet Take 1 tablet (81 mg) by mouth daily 90 tablet 0    atorvastatin (LIPITOR) 40 MG tablet Take 40 mg by mouth every evening      budesonide-formoterol (SYMBICORT) 160-4.5 MCG/ACT Inhaler Inhale 2 puffs into the lungs 2 times daily 10.2 g 11    co-enzyme Q-10 100 MG CAPS capsule Take 2 capsules (200 mg) by mouth daily 2 capsule     fish oil-omega-3 fatty acids 1000 MG capsule Take 1 g by mouth 2 times daily      FLUoxetine (PROZAC) 20 MG capsule TAKE 1 CAPSULE BY MOUTH EVERY DAY      MAG64 64 MG TBEC CR tablet  "TAKE 2 TABLETS BY MOUTH EVERY DAY (Patient taking differently: Take 2 tablets by mouth every evening With food) 180 tablet 1    multivitamin, therapeutic (THERA-VIT) TABS tablet Take 1 tablet by mouth daily      nitroGLYcerin (NITROSTAT) 0.4 MG sublingual tablet One tablet under the tongue every 5 minutes if needed for chest pain. May repeat every 5 minutes for a maximum of 3 doses in 15 minutes\" 25 tablet 3    OXYGEN-HELIUM IN 4-5 L       polyethylene glycol (MIRALAX) 17 GM/Dose powder Take 17 g by mouth daily       sotalol (BETAPACE) 120 MG tablet Take 1 tablet (120 mg) by mouth every 24 hours for 90 days 90 tablet 0    tiotropium (SPIRIVA) 18 MCG inhaled capsule Inhale 1 capsule (18 mcg) into the lungs daily 30 capsule 11    vitamin D2 (ERGOCALCIFEROL) 61472 units (1250 mcg) capsule Take 50,000 Units by mouth twice a week On Sunday and Wednesday      warfarin ANTICOAGULANT (COUMADIN) 2.5 MG tablet Take 1/2 tablet Sun, Tue, Fri; 1 tablet all other days. Adjust as directed by INR clinic. 71 tablet 1    bumetanide (BUMEX) 2 MG tablet Take 1 tablet (2 mg) by mouth 2 times daily for 90 days 180 tablet 0      Allergies   Allergen Reactions    Adhesive Tape Other (See Comments)     ADHESIVE TAPE; SKIN IRRITATION; Skin pulled off with foam tape      Amiodarone      ADVERSE REACTION.  Sunlight sensitivity.    Lisinopril         Physical Examination Review of Systems   /65 (BP Location: Left arm, Patient Position: Sitting, Cuff Size: Adult Large)   Pulse 88   Resp 20   Ht 1.905 m (6' 3\")   SpO2 93%   BMI 31.90 kg/m    Body mass index is 31.9 kg/m .  Wt Readings from Last 3 Encounters:   06/15/23 115.8 kg (255 lb 3.2 oz)   06/13/23 114.8 kg (253 lb)   06/01/23 110.1 kg (242 lb 11.2 oz)       General Appearance:   Pleasant  male, appears  stated age. no acute distress, normal body habitus   ENT/Mouth: membranes moist, no apparent gingival bleeding.      EYES:  no scleral icterus, normal conjunctivae   Neck: no " carotid bruits. No anterior cervical lymphadenopaty   Respiratory:   Diminished breath sounds,  equal chest wall expansion    Cardiovascular:   Regular rhythm, normal rate. Normal first and second heart sounds with no murmurs, rubs, or gallops; the carotid, radial and posterior tibial pulses are intact, Jugular venous pressure not elevated, 1+ pitting lower extremity edema bilaterally    Abdomen/GI:  no organomegaly, masses, bruits, or tenderness; bowel sounds are present   Extremities: no cyanosis or clubbing   Skin: no xanthelasma, warm.    Heme/lymph/ Immunology No apparent bleeding noted.   Neurologic: Alert and oriented. In a wheelchair.     Psychiatric: Pleasant, calm, appropriate affect.    A complete 10 system review of systems was performed and is negative except as mentioned in the HPI/subjective.         Past History   Past Medical History:   Past Medical History:   Diagnosis Date    Anemia     Asthma without status asthmaticus 5/5/2021    BPH (benign prostatic hyperplasia)     Cardiomyopathy (H)     COPD, group B, by GOLD 2017 classification (H)     Coronary artery disease due to calcified coronary lesion     Dyslipidemia, goal LDL below 70     Essential hypertension     Hemoptysis 10/4/2017    History of transfusion     Persistent atrial fibrillation (H)     Pneumonia of left lower lobe due to infectious organism 10/4/2017    Skin cancer of trunk     Status post catheter ablation of atrial fibrillation 6/7/2017    PVI 4-2011 (Cryo/PVI + roof line + CTI line) Re-do PVI 7-2011 (RFA/PVI + CFE + VIDYA + confirmed CTI line)    Ventricular tachycardia (H)        Past Surgical History:   Past Surgical History:   Procedure Laterality Date    CARDIAC DEFIBRILLATOR PLACEMENT      CARDIOVERSION  07/11/2018    x20, last 2/12/15, 10/2015, 11/18/16, 6/16/17 by Lauren Foster CNP    CARDIOVERSION  07/11/2018    CARDIOVERSION  11/19/2021    COLONOSCOPY N/A 4/28/2017    Procedure: COLONOSCOPY with 2 ascending polyps and 1  transverse polyp;  Surgeon: Jose Whittington MD;  Location: [facility];  Service:     CV CORONARY ANGIOGRAM N/A 9/20/2017    Procedure: Coronary Angiogram;  Surgeon: Sergio Cervantes MD;  Location: [facility] Lab;  Service:     CV CORONARY ANGIOGRAM N/A 1/24/2022    Procedure: Coronary Angiogram;  Surgeon: Christi Saunders MD;  Location: [facility] LAB CV    CV LEFT HEART CATH N/A 1/24/2022    Procedure: Left Heart Cath;  Surgeon: Christi Saunders MD;  Location: [facility] CV    CV RIGHT AND LEFT HEART CATH N/A 1/24/2022    Procedure: Right and Left Heart Catherization;  Surgeon: Christi Saunders MD;  Location: [facility] CV    EP ICD GENERATOR REPLACEMENT DUAL N/A 10/28/2022    Procedure: Implantable Cardioverter Defibrillator Generator Replacement Dual;  Surgeon: Elo Collins MD;  Location: [facility] CV    EP ICD INSERT      FRACTURE SURGERY Left     wrist    INGUINAL HERNIA REPAIR Left 1967    while in the [employer] in [employer] after 13 month in [place]    INSERT / REPLACE / REMOVE PACEMAKER      IR MISCELLANEOUS PROCEDURE  4/30/2014    OTHER SURGICAL HISTORY      left hand surgery---tendon repair    WA ABLATE HEART DYSRHYTHM FOCUS  04/2011    Catheter Ablation Atrial Fibrillation PVI Apr 2011 (Cryo+RF-PVI + roof line + CTI line)    WA ABLATE HEART DYSRHYTHM FOCUS  07/2011    Re-do PVI Jul 2011 (RFA-PVI + CFE + VIDYA + confirmation of CTI line)    TOTAL SHOULDER REPLACEMENT Right 03/03/2016    Dr. Abernathy of [facility]    WRIST SURGERY Left     ZZC MYERS W/O FACETEC FORAMOT/DSKC 1/2 VRT SEG, CERVICAL      Laminectomy Lumbar;  Recorded: 03/09/2012;       Family History:   Family History   Problem Relation Age of Onset    Cancer Mother         leukemia    Cancer Father         bladder    Cancer Sister         breast with lung met.    Aneurysm Sister     CABG Brother     CABG Brother     Valvular heart disease Brother         valve replacement       Social History:    Social History     Socioeconomic History    Marital status:      Spouse name: Not on file    Number of children: Not on file    Years of education: Not on file    Highest education level: Not on file   Occupational History    Not on file   Tobacco Use    Smoking status: Former     Packs/day: 1.00     Years: 4.00     Pack years: 4.00     Types: Cigarettes     Quit date: 1968     Years since quittin.5    Smokeless tobacco: Never   Vaping Use    Vaping Use: Never used   Substance and Sexual Activity    Alcohol use: Yes     Alcohol/week: 2.0 standard drinks of alcohol     Comment: Alcoholic Drinks/day: 1 beer per week    Drug use: No    Sexual activity: Yes     Partners: Female     Birth control/protection: Post-menopausal   Other Topics Concern    Parent/sibling w/ CABG, MI or angioplasty before 65F 55M? Not Asked   Social History Narrative    Preloaded 2013     Social Determinants of Health     Financial Resource Strain: Not on file   Food Insecurity: Not on file   Transportation Needs: Not on file   Physical Activity: Not on file   Stress: Not on file   Social Connections: Not on file   Intimate Partner Violence: Not on file   Housing Stability: Not on file              Lab Results    Chemistry/lipid CBC Cardiac Enzymes/BNP/TSH/INR   Lab Results   Component Value Date    CHOL 109 10/06/2022    HDL 58 10/06/2022    LDL 43 10/06/2022    TRIG 39 10/06/2022    CR 1.53 (H) 2023    BUN 25.8 (H) 2023    POTASSIUM 4.0 2023     2023    CO2 24 2023      Lab Results   Component Value Date    WBC 12.6 (H) 2023    HGB 11.7 (L) 2023    HCT 36.2 (L) 2023     2023     (L) 2023      Lab Results   Component Value Date     (H) 2020    NTBNP 3,598 (H) 10/05/2022    TSH 2.38 2023    INR 2.1 2023          Domo Garrett MD [facility]  Non-Invasive Cardiologist  [facility]  Pager  [phone]        Thank you for allowing me to participate in the care of your patient.      Sincerely,     Domo Garrett MD     [facility]  cc:   Domo Garrett MD  [address]  [place] [address]

## 2023-07-13 NOTE — PROGRESS NOTES
HEART CARE ENCOUNTER NOTE      Assessment/Recommendations   Assessment:    Chronic congestive heart failure with reduced left ventricular ejection fraction with left ventricular ejection fraction improved to 50% due to nonischemic dilated cardiomyopathy (out of proportion to his degree of coronary artery disease). NYHA class III-IV. Cardiac catheterization on 1/24/2022 showed normal left-sided filling pressures with much of his heart failure likely being right-sided.. He seems euvolemic today.  Coronary artery disease status post drug-eluting stenting x3 to the xtdlyqct-etbjiqw-fmpwld left anterior descending artery on 9/20/2017. Coronary angiography on 1/24/2022 showed patent stents and no other significant coronary artery disease. Denies angina.  Moderate-to-severe pulmonary hypertension. 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  Medtronic dual-chamber implantable cardiac defibrillator placement on 6/11/2014 and generator change on 10/28/2022 for primary prevention of sudden cardiac death. He has a recently noted increased burden of ventricular pacing felt to be due to frequent mode switching from atrial fibrillation.  History of Medtronic dual-chamber permanent pacemaker placement in 1999 with generator replacement in 2005 and device removal in 2014.  Persistent atrial fibrillation status post multiple electrical cardioversions, most recently on 11/19/2021. He underwent complex catheter ablation x2 in 2011. Recently increased burden of atrial fibrillation. [id number] score is at least 5 (congestive heart failure, hypertension, age 75 or greater, coronary artery disease).  Benign essential hypertension. Normal today.  Hyperlipidemia. Last LDL 43 mg/dL.  Severe chronic obstructive pulmonary disease on 5 liters of supplemental oxygen at home.  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  Chronic kidney disease stage III.  BMI 32.75.    Plan:  Continue bumetanide 2 mg twice daily.  Start empagliflozin 10  mg daily.  His renal dysfunction may not allow for spironolactone.  Continue reduced dose of sotalol 120 mg twice daily, but if he has increasing episodes of atrial fibrillation and subsequent increased mode switching and ventricular pacing, we may need to increase the dose of sotalol back to 160 mg twice daily to prevent further decompensations of heart failure.  Anticoagulation with warfarin goal INR 2-3.  Losartan currently being held due to low blood pressure and renal dysfunction.  Atorvastatin 40 mg daily.  He has been seen in pulmonology clinic with Dr. Payton and pulmonary hypertension clinic with Dr. Morales.  Follows in device clinic.  He has follow-up in Heart Failure [facility] clinic on 8/17/2023.  Follow-up with me in 6 months.       A total time of 40 minutes was spent on the date of this encounter.    History of Present Illness   Mr. Buck Sinclair is a 78 year old male with a significant past history of  HFrEF due to nonischemic cardiomyopathy (out of proportion to CAD) with LVEF most recently noted to be 50%, persistent AF s/p multiple electrical cardioversions and extensive catheter ablations x2 in 2011, CAD s/p CANDELARIA x3 to the pfstgmbn-ej-bxezxx LAD, COPD on home O2, Medtronic dual-chamber permanent pacemaker placement in 1999 replaced with a Medtronic dual-chamber ICD on 6/11/2014 and generator replacement 10/28/2022, PHTN, HTN, and HLD presenting for follow-up.    He was hospitalized 5/18/2023 - 6/1/2023 with acute on chronic hypoxia. He was requiring 10L supplemental O2, previously requiring 4-5L. He was felt to have a combination of acute CHF, COPD exacerbation, and possible pneumonia. He was diuresed from a weight of 269 pounds to 242 pounds. He did develop an CHRISSY felt to be due to overdiuresis, which has since been improving. His sotalol dose was reduced due to prolonged QT and worsened renal function. He was switched from oral furosemide to oral bumetanide. He is currently now on 5L O2.    Weight  has been stable at 242-246 pounds since hospital discharge. He still gets hypoxic with minimal exertion. Stable mild lower extremity edema. No chest pain/pressure/tightness, light headedness/dizziness, pre-syncope, syncope, palpitations, paroxysmal nocturnal dyspnea (PND), or orthopnea.     Cardiac Problems and Cardiac Diagnostics     Most Recent Cardiac testing:  ECG dated 5/23/2023 (personaly reviewed and interpreted): A-V paced with PVC     Device check 5/16/2023 (report reviewed):  Device: Medtronic Cobalt.  Pacing %/Programmed:  48% at AAIR<=>DDDR 60/130. Significant increase in  %. Per trend ~100% % pacing/day.   Lead(s): Stable.  Battery longevity: 9yrs, 9mo.  Presenting: AS- and A-V paced 80 bpm.  Atrial arrhythmias: since 2/6/23; 489 mode switch episodes longest lasting 6 minutes, available EGMs show very fine AF with occasional AP noted, ventricular rates controlled, AF burden 4%.   Anticoagulant: warfarin.  Ventricular arrhythmias: since 2/6/23; No VT/VF detected.  Device/Lead alerts: None.  Comments: normal ICD function. Routed to device RN for review.  Plan: Next remote check scheduled for 8/25/23. LINDA Lin, Device Specialist  ADD: Transmission reviewed. Increase in  is most likely due to frequent mode switching and PMOP at 80bpm as suggested by histogram showing  mainly occurring between 80-90bpm. Underlying rhythm at the last in-clinic check was also notably SB with 1st DAVB.     ECHO 5/18/2023 (report reviewed):   Left ventricular size, wall motion and function are normal. The ejection fraction is 55-60%.  The right ventricle is moderately dilated.  Moderately decreased right ventricular systolic function  The left atrium is moderately dilated.  The right atrium is severely dilated.  There is moderate to mod-severe (2-3+) tricuspid regurgitation.  Severe (>55mmHg) pulmonary hypertension is present.  IVC diameter >2.1 cm collapsing <50% with sniff suggests a high RA pressure estimated at 15  mmHg or greater.     Stress test 11/7/2019 (report reviewed):     The nuclear stress test is abnormal.     There is a small area of nontransmural infarction in the apical segment(s) of the left ventricle.     The left ventricular ejection fraction at stress is 63%.     A prior study was conducted on 7/19/2018.  This study has changes noted when compared with the prior study. The apical defect no longer demonstrates any ischemia.     Cardiac cath 1/24/2022 (report reviewed):   Mild coronary artery disease with patent LAD stents.  LVEDP and PCWP are normal. The PA pressure was elevated at 66/24 mmHg with a mean pressure of 37 mmHg. See numbers below.  Shikha and TD cardiac outputs were both 5.7 l/min.  Sats on 2L NC oxygen: Ao 93%, PA 60%, RA 59%     Medications  Allergies   Current Outpatient Medications   Medication Sig Dispense Refill     acetaminophen (TYLENOL) 325 MG tablet Take 650 mg by mouth 2 times daily as needed       albuterol (PROAIR HFA/PROVENTIL HFA/VENTOLIN HFA) 108 (90 Base) MCG/ACT inhaler Inhale 2 puffs into the lungs every 4 hours as needed for shortness of breath or wheezing 18 g 4     Ascorbic Acid (VITAMIN C) 500 MG CAPS Take 1,000 mg by mouth daily       aspirin (ASA) 81 MG EC tablet Take 1 tablet (81 mg) by mouth daily 90 tablet 0     atorvastatin (LIPITOR) 40 MG tablet Take 40 mg by mouth every evening       budesonide-formoterol (SYMBICORT) 160-4.5 MCG/ACT Inhaler Inhale 2 puffs into the lungs 2 times daily 10.2 g 11     co-enzyme Q-10 100 MG CAPS capsule Take 2 capsules (200 mg) by mouth daily 2 capsule      fish oil-omega-3 fatty acids 1000 MG capsule Take 1 g by mouth 2 times daily       FLUoxetine (PROZAC) 20 MG capsule TAKE 1 CAPSULE BY MOUTH EVERY DAY       MAG64 64 MG TBEC CR tablet TAKE 2 TABLETS BY MOUTH EVERY DAY (Patient taking differently: Take 2 tablets by mouth every evening With food) 180 tablet 1     multivitamin, therapeutic (THERA-VIT) TABS tablet Take 1 tablet by mouth daily  "      nitroGLYcerin (NITROSTAT) 0.4 MG sublingual tablet One tablet under the tongue every 5 minutes if needed for chest pain. May repeat every 5 minutes for a maximum of 3 doses in 15 minutes\" 25 tablet 3     OXYGEN-HELIUM IN 4-5 L        polyethylene glycol (MIRALAX) 17 GM/Dose powder Take 17 g by mouth daily        sotalol (BETAPACE) 120 MG tablet Take 1 tablet (120 mg) by mouth every 24 hours for 90 days 90 tablet 0     tiotropium (SPIRIVA) 18 MCG inhaled capsule Inhale 1 capsule (18 mcg) into the lungs daily 30 capsule 11     vitamin D2 (ERGOCALCIFEROL) 19260 units (1250 mcg) capsule Take 50,000 Units by mouth twice a week On Sunday and Wednesday       warfarin ANTICOAGULANT (COUMADIN) 2.5 MG tablet Take 1/2 tablet Sun, Tue, Fri; 1 tablet all other days. Adjust as directed by INR clinic. 71 tablet 1     bumetanide (BUMEX) 2 MG tablet Take 1 tablet (2 mg) by mouth 2 times daily for 90 days 180 tablet 0      Allergies   Allergen Reactions     Adhesive Tape Other (See Comments)     ADHESIVE TAPE; SKIN IRRITATION; Skin pulled off with foam tape       Amiodarone      ADVERSE REACTION.  Sunlight sensitivity.     Lisinopril         Physical Examination Review of Systems   /65 (BP Location: Left arm, Patient Position: Sitting, Cuff Size: Adult Large)   Pulse 88   Resp 20   Ht 1.905 m (6' 3\")   SpO2 93%   BMI 31.90 kg/m    Body mass index is 31.9 kg/m .  Wt Readings from Last 3 Encounters:   06/15/23 115.8 kg (255 lb 3.2 oz)   06/13/23 114.8 kg (253 lb)   06/01/23 110.1 kg (242 lb 11.2 oz)       General Appearance:   Pleasant  male, appears  stated age. no acute distress, normal body habitus   ENT/Mouth: membranes moist, no apparent gingival bleeding.      EYES:  no scleral icterus, normal conjunctivae   Neck: no carotid bruits. No anterior cervical lymphadenopaty   Respiratory:   Diminished breath sounds,  equal chest wall expansion    Cardiovascular:   Regular rhythm, normal rate. Normal first and second " heart sounds with no murmurs, rubs, or gallops; the carotid, radial and posterior tibial pulses are intact, Jugular venous pressure not elevated, 1+ pitting lower extremity edema bilaterally    Abdomen/GI:  no organomegaly, masses, bruits, or tenderness; bowel sounds are present   Extremities: no cyanosis or clubbing   Skin: no xanthelasma, warm.    Heme/lymph/ Immunology No apparent bleeding noted.   Neurologic: Alert and oriented. In a wheelchair.     Psychiatric: Pleasant, calm, appropriate affect.    A complete 10 system review of systems was performed and is negative except as mentioned in the HPI/subjective.         Past History   Past Medical History:   Past Medical History:   Diagnosis Date     Anemia      Asthma without status asthmaticus 5/5/2021     BPH (benign prostatic hyperplasia)      Cardiomyopathy (H)      COPD, group B, by GOLD 2017 classification (H)      Coronary artery disease due to calcified coronary lesion      Dyslipidemia, goal LDL below 70      Essential hypertension      Hemoptysis 10/4/2017     History of transfusion      Persistent atrial fibrillation (H)      Pneumonia of left lower lobe due to infectious organism 10/4/2017     Skin cancer of trunk      Status post catheter ablation of atrial fibrillation 6/7/2017    PVI 4-2011 (Cryo/PVI + roof line + CTI line) Re-do PVI 7-2011 (RFA/PVI + CFE + VIDYA + confirmed CTI line)     Ventricular tachycardia (H)        Past Surgical History:   Past Surgical History:   Procedure Laterality Date     CARDIAC DEFIBRILLATOR PLACEMENT       CARDIOVERSION  07/11/2018    x20, last 2/12/15, 10/2015, 11/18/16, 6/16/17 by Lauren Foster CNP     CARDIOVERSION  07/11/2018     CARDIOVERSION  11/19/2021     COLONOSCOPY N/A 4/28/2017    Procedure: COLONOSCOPY with 2 ascending polyps and 1 transverse polyp;  Surgeon: Jose Whittington MD;  [facility];  Service:      CV CORONARY ANGIOGRAM N/A 9/20/2017    Procedure: Coronary Angiogram;  Surgeon: Sergio  MD Helen;  Location: [facility] Cath Lab;  Service:      CV CORONARY ANGIOGRAM N/A 1/24/2022    Procedure: Coronary Angiogram;  Surgeon: Christi Saunders MD;  Location: [facility] CATH LAB CV     CV LEFT HEART CATH N/A 1/24/2022    Procedure: Left Heart Cath;  Surgeon: Christi Saunders MD;  Location: [facility] CATH LAB CV     CV RIGHT AND LEFT HEART CATH N/A 1/24/2022    Procedure: Right and Left Heart Catherization;  Surgeon: Christi Saunders MD;  Location: [facility] CV     EP ICD GENERATOR REPLACEMENT DUAL N/A 10/28/2022    Procedure: Implantable Cardioverter Defibrillator Generator Replacement Dual;  Surgeon: Elo Collins MD;  Location: [facility] CV     EP ICD INSERT       FRACTURE SURGERY Left     wrist     INGUINAL HERNIA REPAIR Left 1967    while in the [place] [employer] [place] after 13 month in [place]     INSERT / REPLACE / REMOVE PACEMAKER       IR MISCELLANEOUS PROCEDURE  4/30/2014     OTHER SURGICAL HISTORY      left hand surgery---tendon repair     ND ABLATE HEART DYSRHYTHM FOCUS  04/2011    Catheter Ablation Atrial Fibrillation PVI Apr 2011 (Cryo+RF-PVI + roof line + CTI line)     ND ABLATE HEART DYSRHYTHM FOCUS  07/2011    Re-do PVI Jul 2011 (RFA-PVI + CFE + VIDYA + confirmation of CTI line)     TOTAL SHOULDER REPLACEMENT Right 03/03/2016    Dr. Abernathy of [facility]     WRIST SURGERY Left      ZZC MYERS W/O FACETEC FORAMOT/DSKC 1/2 VRT SEG, CERVICAL      Laminectomy Lumbar;  Recorded: 03/09/2012;       Family History:   Family History   Problem Relation Age of Onset     Cancer Mother         leukemia     Cancer Father         bladder     Cancer Sister         breast with lung met.     Aneurysm Sister      CABG Brother      CABG Brother      Valvular heart disease Brother         valve replacement       Social History:   Social History     Socioeconomic History     Marital status:      Spouse name: Not on file     Number of children: Not on file     Years of education:  Not on file     Highest education level: Not on file   Occupational History     Not on file   Tobacco Use     Smoking status: Former     Packs/day: 1.00     Years: 4.00     Pack years: 4.00     Types: Cigarettes     Quit date: 1968     Years since quittin.5     Smokeless tobacco: Never   Vaping Use     Vaping Use: Never used   Substance and Sexual Activity     Alcohol use: Yes     Alcohol/week: 2.0 standard drinks of alcohol     Comment: Alcoholic Drinks/day: 1 beer per week     Drug use: No     Sexual activity: Yes     Partners: Female     Birth control/protection: Post-menopausal   Other Topics Concern     Parent/sibling w/ CABG, MI or angioplasty before 65F 55M? Not Asked   Social History Narrative    Preloaded 2013     Social Determinants of Health     Financial Resource Strain: Not on file   Food Insecurity: Not on file   Transportation Needs: Not on file   Physical Activity: Not on file   Stress: Not on file   Social Connections: Not on file   Intimate Partner Violence: Not on file   Housing Stability: Not on file              Lab Results    Chemistry/lipid CBC Cardiac Enzymes/BNP/TSH/INR   Lab Results   Component Value Date    CHOL 109 10/06/2022    HDL 58 10/06/2022    LDL 43 10/06/2022    TRIG 39 10/06/2022    CR 1.53 (H) 2023    BUN 25.8 (H) 2023    POTASSIUM 4.0 2023     2023    CO2 24 2023      Lab Results   Component Value Date    WBC 12.6 (H) 2023    HGB 11.7 (L) 2023    HCT 36.2 (L) 2023     2023     (L) 2023      Lab Results   Component Value Date     (H) 2020    NTBNP 3,598 (H) 10/05/2022    TSH 2.38 2023    INR 2.1 2023          Domo Garrett MD [facility]  Non-Invasive Cardiologist  [facility]  Pager [phone]

## 2023-07-14 NOTE — TELEPHONE ENCOUNTER
PA Initiation    Medication:  Jardiance  Insurance Company:    Pharmacy Filling the Rx: [facility]   Filling Pharmacy Phone:  [phone]  Filling Pharmacy Fax:  [phone]  Start Date:

## 2023-07-18 NOTE — PROGRESS NOTES
ANTICOAGULATION MANAGEMENT     Buck Sinclair 78 year old male is on warfarin with subtherapeutic INR result. (Goal INR 2.0-3.0)    Recent labs: (last 7 days)     07/18/23  0000   INR 1.8*       ASSESSMENT       Source(s): Chart Review and Patient/Caregiver Call       Warfarin doses taken: Warfarin taken as instructed    Diet: No new diet changes identified but soon going to increase greens grown from their garden.    Medication/supplement changes: empagliflozin prescribed during Heart care visit on 7/13/23 No interaction anticipated -- still waiting for insurance approval    New illness, injury, or hospitalization: No    Signs or symptoms of bleeding or clotting: No    Previous result: Therapeutic last 2(+) visits noted INR has been trending down    Additional findings:  [place] stated that not great reception there but she will call [facility] on Tuesday if they are actually going.         PLAN     Recommended plan for no diet, medication or health factor changes affecting INR     Dosing Instructions: Increase your warfarin dose (9% change) with next INR in 1 week       Summary  As of 7/18/2023    Full warfarin instructions:  1.25 mg every Tue, Fri; 2.5 mg all other days   Next INR check:  7/25/2023             Telephone call with patient's spouse Neela who verbalizes understanding and agrees to plan and who agrees to plan and repeated back plan correctly    Patient to recheck with home meter    Education provided:     Goal range and lab monitoring: goal range and significance of current result    Dietary considerations: importance of consistent vitamin K intake    Contact [phone]  with any changes, questions or concerns.     Plan made per [facility] anticoagulation protocol    Gisele Prince RN  Anticoagulation Clinic  7/18/2023    _______________________________________________________________________     Anticoagulation Episode Summary     Current INR goal:  2.0-3.0   TTR:  84.4 % (11.6 mo)    Target end date:  Indefinite   Send INR reminders to:  [facility] HOME MONITORING    Indications    Persistent Atrial Fibrillation (Resolved) [I48.91]  Persistent atrial fibrillation (H) [I48.19]           Comments:  Home monitor ( [employer] )managed by exception         Anticoagulation Care Providers     Provider Role Specialty Phone number    Erica Hansen APRN CNP Referring Cardiovascular Disease [phone]    Domo Garrett MD Referring Cardiovascular Disease [phone]    Everett Renee MD  Cardiovascular Disease [phone]

## 2023-07-19 NOTE — TELEPHONE ENCOUNTER
Central Prior Authorization Team   Phone: [phone]      PA Initiation    Medication: JARDIANCE 10 MG PO TABS  Insurance Company: [employer] Part D - Phone [phone] Fax [phone]  Pharmacy Filling the Rx: [id number] [place], [place] - [address] AT [facility] [address]  Filling Pharmacy Phone: [phone]  Filling Pharmacy Fax:    Start Date: 7/19/2023    Started PA on CMM and a response of This medication or product is on your plan's list of covered drugs. Prior authorization is not required at this time. If your pharmacy has questions regarding the processing of your prescription, please have them call the [facility] pharmacy help desk at [phone]. **Please note: This request was submitted electronically. Formulary lowering, tiering exception, cost reduction and/or pre-benefit determination review (including prospective Medicare hospice reviews) requests cannot be requested using this method of submission. Providers contact us at [phone] for further assistance.    Called pharmacy, they already got a paid claim and sold to the patient.

## 2023-07-25 NOTE — PROGRESS NOTES
ANTICOAGULATION MANAGEMENT     Buck Sinclair 78 year old male is on warfarin with subtherapeutic INR result. (Goal INR 2.0-3.0)    Recent labs: (last 7 days)     07/25/23  0000   INR 1.9*       ASSESSMENT       Source(s): Chart Review and Patient/Caregiver Call       Warfarin doses taken: Warfarin taken as instructed    Diet: No new diet changes identified    Medication/supplement changes: None noted    New illness, injury, or hospitalization: No    Signs or symptoms of bleeding or clotting: No    Previous result: Subtherapeutic    Additional findings: None and [place] and will not be home until Friday-Neela did not bring extra warfarin pills,she will start the new dose plan on Friday         PLAN     Recommended plan for no diet, medication or health factor changes affecting INR     Dosing Instructions: Increase your warfarin dose (8% change) with next INR in 1 week       Summary  As of 7/25/2023    Full warfarin instructions:  1.25 mg every Tue; 2.5 mg all other days   Next INR check:  8/1/2023             Telephone call with patient's spouse Neela who verbalizes understanding and agrees to plan and who agrees to plan and repeated back plan correctly    Patient to recheck with home meter    Education provided:     Goal range and lab monitoring: goal range and significance of current result    Plan made per [facility] anticoagulation protocol    iGsele Prince RN  Anticoagulation Clinic  7/25/2023    _______________________________________________________________________     Anticoagulation Episode Summary     Current INR goal:  2.0-3.0   TTR:  82.7 % (11.6 mo)   Target end date:  Indefinite   Send INR reminders to:  [facility] HOME MONITORING    Indications    Persistent Atrial Fibrillation (Resolved) [I48.91]  Persistent atrial fibrillation (H) [I48.19]           Comments:  Home monitor ( [employer] )managed by exception         Anticoagulation Care Providers     Provider Role Specialty Phone number     Erica Hansen APRN CNP Referring Cardiovascular Disease [phone]    Domo Garrett MD Referring Cardiovascular Disease [phone]    Everett Renee MD  Cardiovascular Disease [phone]

## 2023-08-01 NOTE — PROGRESS NOTES
ANTICOAGULATION MANAGEMENT     Buck Sinclair 78 year old male is on warfarin with subtherapeutic INR result. (Goal INR 2.0-3.0)    Recent labs: (last 7 days)     08/01/23  0000   INR 1.9*       ASSESSMENT     Source(s): Chart Review and Patient/Caregiver Call     Warfarin doses taken: Warfarin taken as instructed  Diet: No new diet changes identified  Medication/supplement changes: None noted  New illness, injury, or hospitalization: No  Signs or symptoms of bleeding or clotting: No  Previous result: Subtherapeutic  Additional findings: None       PLAN     Recommended plan for no diet, medication or health factor changes affecting INR     Dosing Instructions: Increase your warfarin dose (7.7% change) with next INR in 1 week       Summary  As of 8/1/2023      Full warfarin instructions:  2.5 mg every day   Next INR check:  8/8/2023               Telephone call with patient's spouse Neela who verbalizes understanding and agrees to plan    Patient to recheck with home meter    Education provided:   Contact [phone]  with any changes, questions or concerns.     Plan made per [facility] anticoagulation protocol    Gisele Prince RN  Anticoagulation Clinic  8/1/2023    _______________________________________________________________________     Anticoagulation Episode Summary       Current INR goal:  2.0-3.0   TTR:  80.7 % (11.6 mo)   Target end date:  Indefinite   Send INR reminders to:  [facility]    Indications    Persistent Atrial Fibrillation (Resolved) [I48.91]  Persistent atrial fibrillation (H) [I48.19]             Comments:  Home monitor ( [employer] )managed by exception             Anticoagulation Care Providers       Provider Role Specialty Phone number    Erica Hansen APRN CNP Referring Cardiovascular Disease [phone]    Domo Garrett MD Referring Cardiovascular Disease [phone]    Everett Renee MD  Cardiovascular Disease [phone]

## 2023-08-08 NOTE — PROGRESS NOTES
ANTICOAGULATION MANAGEMENT     Buck Sinclair 78 year old male is on warfarin with therapeutic INR result. (Goal INR 2.0-3.0)    Recent labs: (last 7 days)     08/08/23  0000   INR 2.0       ASSESSMENT     Source(s): Chart Review  Previous INR was Subtherapeutic  Medication, diet, health changes since last INR chart reviewed; none identified         PLAN     Recommended plan for no diet, medication or health factor changes affecting INR     Dosing Instructions: Continue your current warfarin dose with next INR in 1 week       Summary  As of 8/8/2023      Full warfarin instructions:  2.5 mg every day   Next INR check:  8/15/2023               Detailed voice message left for patient's spouse Neela with dosing instructions and follow up date.     Patient to recheck with home meter    Education provided:   Contact [phone]  with any changes, questions or concerns.     Plan made per [facility] anticoagulation protocol    Gisele Prince RN  Anticoagulation Clinic  8/8/2023    _______________________________________________________________________     Anticoagulation Episode Summary       Current INR goal:  2.0-3.0   TTR:  78.7 % (11.6 mo)   Target end date:  Indefinite   Send INR reminders to:  [facility]    Indications    Persistent Atrial Fibrillation (Resolved) [I48.91]  Persistent atrial fibrillation (H) [I48.19]             Comments:  Home monitor ( [employer] )managed by exception             Anticoagulation Care Providers       Provider Role Specialty Phone number    Erica Hansen APRN CNP Referring Cardiovascular Disease [phone]    Domo Garrett MD Referring Cardiovascular Disease [phone]    Everett Renee MD  Cardiovascular Disease [phone]

## 2023-08-15 NOTE — PROGRESS NOTES
ANTICOAGULATION MANAGEMENT     Buck Sinclair 78 year old male is on warfarin with therapeutic INR result. (Goal INR 2.0-3.0)    Recent labs: (last 7 days)     08/15/23  0000   INR 2.9       ASSESSMENT     Source(s): Chart Review and Home Care/Facility Nurse     Warfarin doses taken: Warfarin taken as instructed  Diet: No new diet changes identified  Medication/supplement changes: None noted  New illness, injury, or hospitalization: No  Signs or symptoms of bleeding or clotting: No  Previous result: Therapeutic last visit; previously outside of goal range  Additional findings: None       PLAN     Recommended plan for no diet, medication or health factor changes affecting INR     Dosing Instructions: Continue your current warfarin dose with next INR in 1 week       Summary  As of 8/15/2023      Full warfarin instructions:  2.5 mg every day   Next INR check:  8/22/2023               Telephone call with Buck who verbalizes understanding and agrees to plan    Patient to recheck with home meter    Education provided:   Goal range and lab monitoring: goal range and significance of current result  Resume manage by exception with home monitor. Continue to submit INR results to home monitor company.You will only be called when your result is out of range. Please call and notify [facility] if new medication started, dose missed, signs or symptoms of bleeding or clotting, or a surgery/procedure is scheduled.    Plan made per [facility] anticoagulation protocol    Duc Samuel RN  Anticoagulation Clinic  8/15/2023    _______________________________________________________________________     Anticoagulation Episode Summary       Current INR goal:  2.0-3.0   TTR:  78.7 % (11.6 mo)   Target end date:  Indefinite   Send INR reminders to:  [facility]    Indications    Persistent Atrial Fibrillation (Resolved) [I48.91]  Persistent atrial fibrillation (H) [I48.19]             Comments:  Home monitor ( [employer] )managed by  exception             Anticoagulation Care Providers       Provider Role Specialty Phone number    Erica Hansen APRN CNP Referring Cardiovascular Disease [phone]    Domo Garrett MD Referring Cardiovascular Disease [phone]    Everett Renee MD  Cardiovascular Disease [phone]

## 2023-08-17 PROBLEM — I48.0 PAROXYSMAL ATRIAL FIBRILLATION (H): Status: ACTIVE | Noted: 2021-10-20

## 2023-08-17 NOTE — PROGRESS NOTES
Assessment/Recommendations   Assessment:    1. Nonischemic cardiomyopathy, heart failure with improved ejection fraction, ejection fraction 55-60%, NYHA class III: Compensated.  He has fatigue, dyspnea on exertion and trace edema.  His weight has been stable since discharge in May.  He tries to follow a low-sodium diet.  He recently was started on Jardiance about a month ago.  2.  Paroxysmal atrial fibrillation: Most recent device check showed A-fib burden of 4%.  He continues sotalol.  He continues warfarin for anticoagulation.  3.  CAD: Denies angina.  Status post 3 drug-eluting stents to LAD in 2017.  Cardiac angiogram in 2022 showed patent stents and no significant CAD.  He continues aspirin and atorvastatin  4.  Hypertension: Controlled.  Blood pressure 111/78  5.  COPD: He follows with pulmonology.  He wears 5 L of oxygen at home.  6.  Chronic kidney disease stage III: BMP pending    Plan:  1.   BMP pending  2.  Continue current medications  3.  Continue monitoring daily weights and following a low-sodium diet    Buck Sinclair will follow up with Dr. Garrett in January and in the heart failure clinic in 3 months.     History of Present Illness/Subjective    Mr. Buck Sinclair is a 78 year old male seen at [facility] heart failure clinic today for continued follow-up.  His wife accompanies him today.  He follows up for nonischemic cardiomyopathy, heart failure with improved ejection fraction.  His most recent echocardiogram was done May 2023 which showed an improved ejection fraction of 55 to 60%.  He was hospitalized in May due to acute heart failure.  He was diuresed and symptoms improved.  His Lasix was changed to Bumex.  He has a past medical history significant for hypertension, paroxysmal atrial fibrillation, coronary artery disease with 3 stents to LAD, COPD on oxygen pulmonary hypertension, chronic kidney disease stage III.    During the last clinic visit, Dr. Garrett started  Jardiance.  He states he has not noticed any changes with starting medication.  He continues to have fatigue, dyspnea on exertion and trace edema.  He denies lightheadedness, orthopnea, PND, palpitations, chest pain, and abdominal fullness/bloating.      He is monitoring home weights which are stable between 243-247 pounds.  He is following a low sodium diet.    ECHOCARDIOGRAM: 5/21/2023  Interpretation Summary     Limited views were obtained.     1. The left ventricle is normal in size. Left ventricular function is  normal.The ejection fraction is 55-60%.  2. The right ventricle is mildly dilated.Moderately decreased right  ventricular systolic function  3. The left atrium is moderate to severely dilated. The right atrium is  severely dilated.  4. A contrast injection (Bubble Study) was performed that was negative for  flow across the interatrial septum. There is no color Doppler evidence of an  atrial shunt.     Physical Examination Review of Systems   /78 (BP Location: Right arm, Patient Position: Sitting, Cuff Size: Adult Large)   Pulse 80   Resp 12   Wt 115.2 kg (254 lb)   SpO2 94%   BMI 31.75 kg/m    Body mass index is 31.75 kg/m .  Wt Readings from Last 3 Encounters:   08/17/23 115.2 kg (254 lb)   06/15/23 115.8 kg (255 lb 3.2 oz)   06/13/23 114.8 kg (253 lb)       General Appearance:   no acute distress   ENT/Mouth: No abnormalities   EYES:  no scleral icterus, normal conjunctivae   Neck: no thyromegaly   Chest/Lungs:   lungs are clear to auscultation, no rales or wheezing, equal chest wall expansion, wearing oxygen   Cardiovascular:   Regular. Normal first and second heart sounds with no murmurs, rubs, or gallops, trace edema bilaterally    Abdomen:  bowel sounds are present   Extremities: no cyanosis or clubbing   Skin: warm   Neurologic: no tremors     Psychiatric: alert and oriented x3    Enc Vitals  BP: 111/78  Pulse: 80  Resp: 12  SpO2: 94 %  Weight: 115.2 kg (254 lb) (Shoes on)                                          Medical History  Surgical History Family History Social History   Past Medical History:   Diagnosis Date     Anemia      Asthma without status asthmaticus 05/05/2021     Atrial fibrillation (H)      BPH (benign prostatic hyperplasia)      Cardiomyopathy (H)      CKD (chronic kidney disease) stage 3, GFR 30-59 ml/min (H) 05/24/2023     Congestive heart failure (H)      COPD, group B, by GOLD 2017 classification (H)      Coronary artery disease due to calcified coronary lesion      Dyslipidemia, goal LDL below 70      Essential hypertension      Heart failure with reduced ejection fraction (H) 08/17/2023     Hemoptysis 10/04/2017     History of transfusion      Hyperlipidemia      Persistent atrial fibrillation (H)      Pneumonia of left lower lobe due to infectious organism 10/04/2017     Pulmonary hypertension (H)      Skin cancer of trunk      Status post catheter ablation of atrial fibrillation 06/07/2017    PVI 4-2011 (Cryo/PVI + roof line + CTI line) Re-do PVI 7-2011 (RFA/PVI + CFE + VIDYA + confirmed CTI line)     Ventricular tachycardia (H)     Past Surgical History:   Procedure Laterality Date     CARDIAC DEFIBRILLATOR PLACEMENT       CARDIOVERSION  07/11/2018    x20, last 2/12/15, 10/2015, 11/18/16, 6/16/17 by Lauren Foster CNP     CARDIOVERSION  07/11/2018     CARDIOVERSION  11/19/2021     COLONOSCOPY N/A 04/28/2017    Procedure: COLONOSCOPY with 2 ascending polyps and 1 transverse polyp;  Surgeon: Jose Whittington MD;  Location: [facility] GI;  Service:      CORONARY ANGIOGRAPHY ADULT ORDER       CV CORONARY ANGIOGRAM N/A 09/20/2017    Procedure: Coronary Angiogram;  Surgeon: Sergio Cervantes MD;  Location: [facility] Cath Lab;  Service:      CV CORONARY ANGIOGRAM N/A 01/24/2022    Procedure: Coronary Angiogram;  Surgeon: Christi Saunders MD;  Location: [facility] CATH LAB CV     CV LEFT HEART CATH N/A 01/24/2022    Procedure: Left Heart Cath;  Surgeon: Christi Saunders MD;   Location: [facility] LAB CV     CV RIGHT AND LEFT HEART CATH N/A 01/24/2022    Procedure: Right and Left Heart Catherization;  Surgeon: Christi Saunders MD;  Location: [facility] CATH LAB CV     EP ICD GENERATOR REPLACEMENT DUAL N/A 10/28/2022    Procedure: Implantable Cardioverter Defibrillator Generator Replacement Dual;  Surgeon: Elo Collins MD;  Location: [facility] CATH [facility] CV     EP ICD INSERT       FRACTURE SURGERY Left     wrist     HEART CATH, ANGIOPLASTY       IMPLANT AUTOMATIC IMPLANTABLE CARDIOVERTER DEFIBRILLATOR       INGUINAL HERNIA REPAIR Left 1967    while in the [employer] in [place] after 13 month in [place]     INSERT / REPLACE / REMOVE PACEMAKER       IR MISCELLANEOUS PROCEDURE  04/30/2014     OTHER SURGICAL HISTORY      left hand surgery---tendon repair     [place] ABLATE HEART DYSRHYTHM FOCUS  04/2011    Catheter Ablation Atrial Fibrillation PVI Apr 2011 (Cryo+RF-PVI + roof line + CTI line)     [place] ABLATE HEART DYSRHYTHM FOCUS  07/2011    Re-do PVI Jul 2011 (RFA-PVI + CFE + VIDYA + confirmation of CTI line)     TOTAL SHOULDER REPLACEMENT Right 03/03/2016    Dr. Abernathy of [facility]     WRIST SURGERY Left      ZZC MYERS W/O FACETEC FORAMOT/DSKC 1/2 VRT SEG, CERVICAL      Laminectomy Lumbar;  Recorded: 03/09/2012;    Family History   Problem Relation Age of Onset     Cancer Mother         leukemia     Cancer Father         bladder     Cancer Sister         breast with lung met.     Aneurysm Sister      CABG Brother      CABG Brother      Valvular heart disease Brother         valve replacement    Social History     Socioeconomic History     Marital status:      Spouse name: Not on file     Number of children: Not on file     Years of education: Not on file     Highest education level: Not on file   Occupational History     Not on file   Tobacco Use     Smoking status: Former     Packs/day: 1.00     Years: 4.00     Pack years: 4.00     Types: Cigarettes     Quit date: 1/1/1968      Years since quittin.6     Smokeless tobacco: Never   Vaping Use     Vaping Use: Never used   Substance and Sexual Activity     Alcohol use: Yes     Alcohol/week: 2.0 standard drinks of alcohol     Comment: Alcoholic Drinks/day: 1 beer per week     Drug use: No     Sexual activity: Yes     Partners: Female     Birth control/protection: Post-menopausal   Other Topics Concern     Parent/sibling w/ CABG, MI or angioplasty before 65F 55M? Not Asked   Social History Narrative    Preloaded 2013     Social Determinants of Health     Financial Resource Strain: Not on file   Food Insecurity: Not on file   Transportation Needs: Not on file   Physical Activity: Not on file   Stress: Not on file   Social Connections: Not on file   Intimate Partner Violence: Not on file   Housing Stability: Not on file          Medications  Allergies   Current Outpatient Medications   Medication Sig Dispense Refill     acetaminophen (TYLENOL) 325 MG tablet Take 650 mg by mouth 2 times daily as needed       albuterol (PROAIR HFA/PROVENTIL HFA/VENTOLIN HFA) 108 (90 Base) MCG/ACT inhaler Inhale 2 puffs into the lungs every 4 hours as needed for shortness of breath or wheezing 18 g 4     Ascorbic Acid (VITAMIN C) 500 MG CAPS Take 1,000 mg by mouth daily       aspirin (ASA) 81 MG EC tablet Take 1 tablet (81 mg) by mouth daily 90 tablet 0     atorvastatin (LIPITOR) 40 MG tablet Take 40 mg by mouth every evening       budesonide-formoterol (SYMBICORT) 160-4.5 MCG/ACT Inhaler Inhale 2 puffs into the lungs 2 times daily 10.2 g 11     bumetanide (BUMEX) 2 MG tablet Take 1 tablet (2 mg) by mouth 2 times daily 180 tablet 3     co-enzyme Q-10 100 MG CAPS capsule Take 2 capsules (200 mg) by mouth daily 2 capsule      empagliflozin (JARDIANCE) 10 MG TABS tablet Take 1 tablet (10 mg) by mouth daily 90 tablet 3     fish oil-omega-3 fatty acids 1000 MG capsule Take 1 g by mouth 2 times daily       FLUoxetine (PROZAC) 20 MG capsule TAKE 1 CAPSULE BY  "MOUTH EVERY DAY       MAG64 64 MG TBEC CR tablet TAKE 2 TABLETS BY MOUTH EVERY DAY (Patient taking differently: Take 2 tablets by mouth every evening With food) 180 tablet 1     multivitamin, therapeutic (THERA-VIT) TABS tablet Take 1 tablet by mouth daily       nitroGLYcerin (NITROSTAT) 0.4 MG sublingual tablet One tablet under the tongue every 5 minutes if needed for chest pain. May repeat every 5 minutes for a maximum of 3 doses in 15 minutes\" 25 tablet 3     OXYGEN-HELIUM IN 4-5 L        polyethylene glycol (MIRALAX) 17 GM/Dose powder Take 17 g by mouth daily        sotalol (BETAPACE) 120 MG tablet Take 1 tablet (120 mg) by mouth every 24 hours 90 tablet 3     tiotropium (SPIRIVA) 18 MCG inhaled capsule Inhale 1 capsule (18 mcg) into the lungs daily 30 capsule 11     vitamin D2 (ERGOCALCIFEROL) 40536 units (1250 mcg) capsule Take 50,000 Units by mouth twice a week On Sunday and Wednesday       warfarin ANTICOAGULANT (COUMADIN) 2.5 MG tablet Take 1/2 tablet Sun, Tue, Fri; 1 tablet all other days. Adjust as directed by INR clinic. (Patient taking differently: Take 2.5 mg by mouth daily Take 1/2 tablet Sun, Tue, Fri; 1 tablet all other days. Adjust as directed by INR clinic.) 71 tablet 1    Allergies   Allergen Reactions     Adhesive Tape Other (See Comments)     ADHESIVE TAPE; SKIN IRRITATION; Skin pulled off with foam tape       Amiodarone      ADVERSE REACTION.  Sunlight sensitivity.     Lisinopril          Lab Results    Chemistry/lipid CBC Cardiac Enzymes/BNP/TSH/INR   Lab Results   Component Value Date    CHOL 109 10/06/2022    HDL 58 10/06/2022    TRIG 39 10/06/2022    BUN 25.8 (H) 07/12/2023     07/12/2023    CO2 24 07/12/2023    Lab Results   Component Value Date    WBC 12.6 (H) 06/01/2023    HGB 11.7 (L) 06/01/2023    HCT 36.2 (L) 06/01/2023     06/01/2023     (L) 06/01/2023    Lab Results   Component Value Date     (H) 12/01/2020    TSH 2.38 05/18/2023    INR 2.9 08/15/2023    "          This note has been dictated using voice recognition software. Any grammatical, typographical, or context distortions are unintentional and inherent to the software    30 minutes spent on the date of encounter doing chart review, review of outside records, review of test results, interpretation with above tests, patient visit, documentation, and discussion with family.

## 2023-08-17 NOTE — LETTER
8/17/2023    Vivek Guerrero MD  [address] [address]    RE: Buck Sinclair       Dear Colleague,     I had the pleasure of seeing Buck Sinclair in the [facility].        Assessment/Recommendations   Assessment:    1. Nonischemic cardiomyopathy, heart failure with improved ejection fraction, ejection fraction 55-60%, NYHA class III: Compensated.  He has fatigue, dyspnea on exertion and trace edema.  His weight has been stable since discharge in May.  He tries to follow a low-sodium diet.  He recently was started on Jardiance about a month ago.  2.  Paroxysmal atrial fibrillation: Most recent device check showed A-fib burden of 4%.  He continues sotalol.  He continues warfarin for anticoagulation.  3.  CAD: Denies angina.  Status post 3 drug-eluting stents to LAD in 2017.  Cardiac angiogram in 2022 showed patent stents and no significant CAD.  He continues aspirin and atorvastatin  4.  Hypertension: Controlled.  Blood pressure 111/78  5.  COPD: He follows with pulmonology.  He wears 5 L of oxygen at home.  6.  Chronic kidney disease stage III: BMP pending    Plan:  1.   BMP pending  2.  Continue current medications  3.  Continue monitoring daily weights and following a low-sodium diet    Buck Sinclair will follow up with Dr. Garrett in January and in the heart failure clinic in 3 months.     History of Present Illness/Subjective    Mr. Buck Sinclair is a 78 year old male seen at [facility] heart failure clinic today for continued follow-up.  His wife accompanies him today.  He follows up for nonischemic cardiomyopathy, heart failure with improved ejection fraction.  His most recent echocardiogram was done May 2023 which showed an improved ejection fraction of 55 to 60%.  He was hospitalized in May due to acute heart failure.  He was diuresed and symptoms improved.  His Lasix was changed to Bumex.  He has a past medical history significant for hypertension,  paroxysmal atrial fibrillation, coronary artery disease with 3 stents to LAD, COPD on oxygen pulmonary hypertension, chronic kidney disease stage III.    During the last clinic visit, Dr. Garrett started Jardiance.  He states he has not noticed any changes with starting medication.  He continues to have fatigue, dyspnea on exertion and trace edema.  He denies lightheadedness, orthopnea, PND, palpitations, chest pain, and abdominal fullness/bloating.      He is monitoring home weights which are stable between 243-247 pounds.  He is following a low sodium diet.    ECHOCARDIOGRAM: 5/21/2023  Interpretation Summary     Limited views were obtained.     1. The left ventricle is normal in size. Left ventricular function is  normal.The ejection fraction is 55-60%.  2. The right ventricle is mildly dilated.Moderately decreased right  ventricular systolic function  3. The left atrium is moderate to severely dilated. The right atrium is  severely dilated.  4. A contrast injection (Bubble Study) was performed that was negative for  flow across the interatrial septum. There is no color Doppler evidence of an  atrial shunt.     Physical Examination Review of Systems   /78 (BP Location: Right arm, Patient Position: Sitting, Cuff Size: Adult Large)   Pulse 80   Resp 12   Wt 115.2 kg (254 lb)   SpO2 94%   BMI 31.75 kg/m    Body mass index is 31.75 kg/m .  Wt Readings from Last 3 Encounters:   08/17/23 115.2 kg (254 lb)   06/15/23 115.8 kg (255 lb 3.2 oz)   06/13/23 114.8 kg (253 lb)       General Appearance:   no acute distress   ENT/Mouth: No abnormalities   EYES:  no scleral icterus, normal conjunctivae   Neck: no thyromegaly   Chest/Lungs:   lungs are clear to auscultation, no rales or wheezing, equal chest wall expansion, wearing oxygen   Cardiovascular:   Regular. Normal first and second heart sounds with no murmurs, rubs, or gallops, trace edema bilaterally    Abdomen:  bowel sounds are present   Extremities: no  cyanosis or clubbing   Skin: warm   Neurologic: no tremors     Psychiatric: alert and oriented x3    Enc Vitals  BP: 111/78  Pulse: 80  Resp: 12  SpO2: 94 %  Weight: 115.2 kg (254 lb) (Shoes on)                                         Medical History  Surgical History Family History Social History   Past Medical History:   Diagnosis Date    Anemia     Asthma without status asthmaticus 05/05/2021    Atrial fibrillation (H)     BPH (benign prostatic hyperplasia)     Cardiomyopathy (H)     CKD (chronic kidney disease) stage 3, GFR 30-59 ml/min (H) 05/24/2023    Congestive heart failure (H)     COPD, group B, by GOLD 2017 classification (H)     Coronary artery disease due to calcified coronary lesion     Dyslipidemia, goal LDL below 70     Essential hypertension     Heart failure with reduced ejection fraction (H) 08/17/2023    Hemoptysis 10/04/2017    History of transfusion     Hyperlipidemia     Persistent atrial fibrillation (H)     Pneumonia of left lower lobe due to infectious organism 10/04/2017    Pulmonary hypertension (H)     Skin cancer of trunk     Status post catheter ablation of atrial fibrillation 06/07/2017    PVI 4-2011 (Cryo/PVI + roof line + CTI line) Re-do PVI 7-2011 (RFA/PVI + CFE + VIDYA + confirmed CTI line)    Ventricular tachycardia (H)     Past Surgical History:   Procedure Laterality Date    CARDIAC DEFIBRILLATOR PLACEMENT      CARDIOVERSION  07/11/2018    x20, last 2/12/15, 10/2015, 11/18/16, 6/16/17 by Lauren Foster CNP    CARDIOVERSION  07/11/2018    CARDIOVERSION  11/19/2021    COLONOSCOPY N/A 04/28/2017    Procedure: COLONOSCOPY with 2 ascending polyps and 1 transverse polyp;  Surgeon: Jose Whittington MD;  Location: [facility] GI;  Service:     CORONARY ANGIOGRAPHY ADULT ORDER      CV CORONARY ANGIOGRAM N/A 09/20/2017    Procedure: Coronary Angiogram;  Surgeon: Sergio Cervantes MD;  Location: [facility] Cath Lab;  Service:     CV CORONARY ANGIOGRAM N/A 01/24/2022    Procedure: Coronary  Angiogram;  Surgeon: Christi Saunders MD;  Location: [facility] CATH LAB CV    CV LEFT HEART CATH N/A 01/24/2022    Procedure: Left Heart Cath;  Surgeon: Christi Saunders MD;  Location: [facility] CATH LAB CV    CV RIGHT AND LEFT HEART CATH N/A 01/24/2022    Procedure: Right and Left Heart Catherization;  Surgeon: Christi Saunders MD;  Location: [facility] CATH LAB CV    EP ICD GENERATOR REPLACEMENT DUAL N/A 10/28/2022    Procedure: Implantable Cardioverter Defibrillator Generator Replacement Dual;  Surgeon: Elo Collins MD;  Location: [facility] CATH LAB CV    EP ICD INSERT      FRACTURE SURGERY Left     wrist    HEART CATH, ANGIOPLASTY      IMPLANT AUTOMATIC IMPLANTABLE CARDIOVERTER DEFIBRILLATOR      INGUINAL HERNIA REPAIR Left 1967    while in the [employer] in [place] after 13 month in [place]    INSERT / REPLACE / REMOVE PACEMAKER      IR MISCELLANEOUS PROCEDURE  04/30/2014    OTHER SURGICAL HISTORY      left hand surgery---tendon repair    KS ABLATE HEART DYSRHYTHM FOCUS  04/2011    Catheter Ablation Atrial Fibrillation PVI Apr 2011 (Cryo+RF-PVI + roof line + CTI line)    KS ABLATE HEART DYSRHYTHM FOCUS  07/2011    Re-do PVI Jul 2011 (RFA-PVI + CFE + VIDYA + confirmation of CTI line)    TOTAL SHOULDER REPLACEMENT Right 03/03/2016    Dr. Abernathy of [facility]    WRIST SURGERY Left     ZZC MYERS W/O FACETEC FORAMOT/DSKC 1/2 VRT SEG, CERVICAL      Laminectomy Lumbar;  Recorded: 03/09/2012;    Family History   Problem Relation Age of Onset    Cancer Mother         leukemia    Cancer Father         bladder    Cancer Sister         breast with lung met.    Aneurysm Sister     CABG Brother     CABG Brother     Valvular heart disease Brother         valve replacement    Social History     Socioeconomic History    Marital status:      Spouse name: Not on file    Number of children: Not on file    Years of education: Not on file    Highest education level: Not on file   Occupational History    Not on file    Tobacco Use    Smoking status: Former     Packs/day: 1.00     Years: 4.00     Pack years: 4.00     Types: Cigarettes     Quit date: 1968     Years since quittin.6    Smokeless tobacco: Never   Vaping Use    Vaping Use: Never used   Substance and Sexual Activity    Alcohol use: Yes     Alcohol/week: 2.0 standard drinks of alcohol     Comment: Alcoholic Drinks/day: 1 beer per week    Drug use: No    Sexual activity: Yes     Partners: Female     Birth control/protection: Post-menopausal   Other Topics Concern    Parent/sibling w/ CABG, MI or angioplasty before 65F 55M? Not Asked   Social History Narrative    Preloaded 2013     Social Determinants of Health     Financial Resource Strain: Not on file   Food Insecurity: Not on file   Transportation Needs: Not on file   Physical Activity: Not on file   Stress: Not on file   Social Connections: Not on file   Intimate Partner Violence: Not on file   Housing Stability: Not on file          Medications  Allergies   Current Outpatient Medications   Medication Sig Dispense Refill    acetaminophen (TYLENOL) 325 MG tablet Take 650 mg by mouth 2 times daily as needed      albuterol (PROAIR HFA/PROVENTIL HFA/VENTOLIN HFA) 108 (90 Base) MCG/ACT inhaler Inhale 2 puffs into the lungs every 4 hours as needed for shortness of breath or wheezing 18 g 4    Ascorbic Acid (VITAMIN C) 500 MG CAPS Take 1,000 mg by mouth daily      aspirin (ASA) 81 MG EC tablet Take 1 tablet (81 mg) by mouth daily 90 tablet 0    atorvastatin (LIPITOR) 40 MG tablet Take 40 mg by mouth every evening      budesonide-formoterol (SYMBICORT) 160-4.5 MCG/ACT Inhaler Inhale 2 puffs into the lungs 2 times daily 10.2 g 11    bumetanide (BUMEX) 2 MG tablet Take 1 tablet (2 mg) by mouth 2 times daily 180 tablet 3    co-enzyme Q-10 100 MG CAPS capsule Take 2 capsules (200 mg) by mouth daily 2 capsule     empagliflozin (JARDIANCE) 10 MG TABS tablet Take 1 tablet (10 mg) by mouth daily 90 tablet 3    fish  "oil-omega-3 fatty acids 1000 MG capsule Take 1 g by mouth 2 times daily      FLUoxetine (PROZAC) 20 MG capsule TAKE 1 CAPSULE BY MOUTH EVERY DAY      MAG64 64 MG TBEC CR tablet TAKE 2 TABLETS BY MOUTH EVERY DAY (Patient taking differently: Take 2 tablets by mouth every evening With food) 180 tablet 1    multivitamin, therapeutic (THERA-VIT) TABS tablet Take 1 tablet by mouth daily      nitroGLYcerin (NITROSTAT) 0.4 MG sublingual tablet One tablet under the tongue every 5 minutes if needed for chest pain. May repeat every 5 minutes for a maximum of 3 doses in 15 minutes\" 25 tablet 3    OXYGEN-HELIUM IN 4-5 L       polyethylene glycol (MIRALAX) 17 GM/Dose powder Take 17 g by mouth daily       sotalol (BETAPACE) 120 MG tablet Take 1 tablet (120 mg) by mouth every 24 hours 90 tablet 3    tiotropium (SPIRIVA) 18 MCG inhaled capsule Inhale 1 capsule (18 mcg) into the lungs daily 30 capsule 11    vitamin D2 (ERGOCALCIFEROL) 38841 units (1250 mcg) capsule Take 50,000 Units by mouth twice a week On Sunday and Wednesday      warfarin ANTICOAGULANT (COUMADIN) 2.5 MG tablet Take 1/2 tablet Sun, Tue, Fri; 1 tablet all other days. Adjust as directed by INR clinic. (Patient taking differently: Take 2.5 mg by mouth daily Take 1/2 tablet Sun, Tue, Fri; 1 tablet all other days. Adjust as directed by INR clinic.) 71 tablet 1    Allergies   Allergen Reactions    Adhesive Tape Other (See Comments)     ADHESIVE TAPE; SKIN IRRITATION; Skin pulled off with foam tape      Amiodarone      ADVERSE REACTION.  Sunlight sensitivity.    Lisinopril          Lab Results    Chemistry/lipid CBC Cardiac Enzymes/BNP/TSH/INR   Lab Results   Component Value Date    CHOL 109 10/06/2022    HDL 58 10/06/2022    TRIG 39 10/06/2022    BUN 25.8 (H) 07/12/2023     07/12/2023    CO2 24 07/12/2023    Lab Results   Component Value Date    WBC 12.6 (H) 06/01/2023    HGB 11.7 (L) 06/01/2023    HCT 36.2 (L) 06/01/2023     06/01/2023     (L) " 06/01/2023    Lab Results   Component Value Date     (H) 12/01/2020    TSH 2.38 05/18/2023    INR 2.9 08/15/2023             This note has been dictated using voice recognition software. Any grammatical, typographical, or context distortions are unintentional and inherent to the software    30 minutes spent on the date of encounter doing chart review, review of outside records, review of test results, interpretation with above tests, patient visit, documentation, and discussion with family.                Thank you for allowing me to participate in the care of your patient.      Sincerely,     GIBRAN Alamo CNP     [facility]  cc:   GIBRAN Alamo CNP  [address], [address]  [place] [address]

## 2023-08-17 NOTE — PATIENT INSTRUCTIONS
Buck Sinclair,    It was a pleasure to see you today at [facility].     My recommendations after this visit include:  - Please follow up with Dr Garrett in January   - Please follow up with Jessica Barrera in 3 months  - I have checked labs  - Continue current medications    Jessica Barrera, CNP

## 2023-08-18 NOTE — TELEPHONE ENCOUNTER
PA Initiation    Medication:  Jardiance 10mg   Insurance Company:  [employer] [facility]  Pharmacy Filling the Rx:  [facility]  Filling Pharmacy Phone:  [phone]  Filling Pharmacy Fax:  [phone]  Start Date:   7-13-23    CoverMyMeds:  Key:  [id number]

## 2023-08-18 NOTE — TELEPHONE ENCOUNTER
Central Prior Authorization Team   Phone: [phone]    Duplicate request - see TE 7/14/23.  PA not needed.

## 2023-08-22 NOTE — PROGRESS NOTES
ANTICOAGULATION  MANAGEMENT-Home Monitor Managed by Exception    Buck JONES Sinclair 78 year old male is on warfarin with therapeutic INR result. (Goal INR 2.0-3.0)    Recent labs: (last 7 days)     08/22/23  0000   INR 2.5       Previous INR was Therapeutic  Medication, diet, health changes since last INR:chart reviewed; none identified  Contacted within the last 12 weeks by phone on 08/15/2023      LUCAS Jane was NOT contacted regarding therapeutic result today per home monitoring policy manage by exception agreement.   Current warfarin dose is to be continued:     Summary  As of 8/22/2023      Full warfarin instructions:  2.5 mg every day   Next INR check:  9/5/2023             ?   Duc Samuel RN  Anticoagulation Clinic  8/22/2023    _______________________________________________________________________     Anticoagulation Episode Summary       Current INR goal:  2.0-3.0   TTR:  80.2 % (11.6 mo)   Target end date:  Indefinite   Send INR reminders to:  [facility]    Indications    Persistent Atrial Fibrillation (Resolved) [I48.91]  Paroxysmal atrial fibrillation (H) [I48.0]             Comments:  Home monitor ( [employer] )managed by exception             Anticoagulation Care Providers       Provider Role Specialty Phone number    Erica Hansen APRN CNP Referring Cardiovascular Disease [phone]    Domo Garrett MD Referring Cardiovascular Disease [phone]    Everett Renee MD  Cardiovascular Disease [phone]

## 2023-08-25 NOTE — TELEPHONE ENCOUNTER
Encounter Type:  Routine remote pacemaker check   Device:  Medtronic Jeniffer (D) PPM  Pacing % /Programmed: 59.6%AP 98.4%  in DDDR 60-130bpm  Lead(s): Stable  Battery longevity: 11.1 years  Presenting: ASVP 67bpm  Atrial high rates: Since 5/16/2023 2 mode switches for <0.1% of the time totaling 4 hrs. VR>/=120~30-35% of the time  Ventricular High rates: 2 VHR, both episodes occurred on 5/30/2023; EGM shows 12-15 beats of NSVT vs AVNRT  Comments: Normal device function.

## 2023-08-29 NOTE — PROGRESS NOTES
ANTICOAGULATION  MANAGEMENT-Home Monitor Managed by Exception    Buck JONES Sinclair 78 year old male is on warfarin with therapeutic INR result. (Goal INR 2.0-3.0)    Recent labs: (last 7 days)     08/29/23  0000   INR 2.2       Previous INR was Therapeutic  Medication, diet, health changes since last INR:chart reviewed; none identified  Contacted within the last 12 weeks by phone on 8/15/23      LUCAS     Buck was NOT contacted regarding therapeutic result today per home monitoring policy manage by exception agreement.   Current warfarin dose is to be continued:     Summary  As of 8/29/2023      Full warfarin instructions:  2.5 mg every day   Next INR check:  9/5/2023             ?   Deepa Godoy RN  Anticoagulation Clinic  8/29/2023    _______________________________________________________________________     Anticoagulation Episode Summary       Current INR goal:  2.0-3.0   TTR:  81.7 % (11.6 mo)   Target end date:  Indefinite   Send INR reminders to:  [facility]    Indications    Persistent Atrial Fibrillation (Resolved) [I48.91]  Paroxysmal atrial fibrillation (H) [I48.0]             Comments:  Home monitor ( [employer] )managed by exception             Anticoagulation Care Providers       Provider Role Specialty Phone number    Erica Hansen APRN CNP Referring Cardiovascular Disease [phone]    Domo Garrett MD Referring Cardiovascular Disease [phone]    Everett Renee MD  Cardiovascular Disease [phone]

## 2023-09-05 NOTE — PROGRESS NOTES
ANTICOAGULATION MANAGEMENT     Buck Sinclair 78 year old male is on warfarin with subtherapeutic INR result. (Goal INR 2.0-3.0)    Recent labs: (last 7 days)     09/05/23  0000   INR 1.9*       ASSESSMENT     Source(s): Chart Review and Patient/Caregiver Call     Warfarin doses taken: Warfarin taken as instructed  Diet:  patient has been eating a lot of cucumbers out of the garden (peeled) and will continue until they freeze.  Medication/supplement changes: None noted  New illness, injury, or hospitalization: No  Signs or symptoms of bleeding or clotting: No  Previous result: Therapeutic last 2(+) visits  Additional findings:  Wife concerned about INR trending down, shared clinical decision to increase dose.       PLAN     Recommended plan for ongoing change(s) affecting INR     Dosing Instructions: Increase your warfarin dose (7.1% change) with next INR in 1 week       Summary  As of 9/5/2023      Full warfarin instructions:  3.75 mg every Tue; 2.5 mg all other days   Next INR check:  9/12/2023               Telephone call with Neela (advised by patient to contact wife for dosing) who verbalizes understanding and agrees to plan  Sent [employer] message with dosing and follow up instructions    Patient to recheck with home meter    Education provided:   Please call back if any changes to your diet, medications or how you've been taking warfarin    Plan made per [facility] anticoagulation protocol    Marina Velazco RN  Anticoagulation Clinic  9/5/2023    _______________________________________________________________________     Anticoagulation Episode Summary       Current INR goal:  2.0-3.0   TTR:  81.0 % (11.6 mo)   Target end date:  Indefinite   Send INR reminders to:  [facility]    Indications    Persistent Atrial Fibrillation (Resolved) [I48.91]  Paroxysmal atrial fibrillation (H) [I48.0]             Comments:  Home monitor ( [employer] )managed by exception             Anticoagulation Care Providers        Provider Role Specialty Phone number    Erica Hansen APRN CNP Referring Cardiovascular Disease [phone]    Domo Garrett MD Referring Cardiovascular Disease [phone]    Everett Renee MD  Cardiovascular Disease [phone]

## 2023-09-12 PROBLEM — I48.19 PERSISTENT ATRIAL FIBRILLATION (H): Status: ACTIVE | Noted: 2023-01-01

## 2023-09-12 NOTE — PROGRESS NOTES
ANTICOAGULATION CLINIC REFERRAL RENEWAL REQUEST       An annual renewal order is required for all patients referred to [facility].?  Please review and sign the pended referral order for Buck Sinclair.       ANTICOAGULATION SUMMARY      Warfarin indication(s)   Persistent Atrial Fibrillation (Resolved) [I48.91]  Paroxysmal atrial fibrillation (H) [I48.0]           Current goal range   INR: 2.0-3.0     Goal appropriate for indication? Goal INR 2-3, standard for indication(s) above     Time in Therapeutic Range (TTR)  (Goal > 60%) 8.5%       Office visit with referring provider's group within last year yes on 8/17/23       Deepa Godoy, RN  [facility]

## 2023-09-12 NOTE — PROGRESS NOTES
ANTICOAGULATION MANAGEMENT     Buck Sinclair 78 year old male is on warfarin with therapeutic INR result. (Goal INR 2.0-3.0)    Recent labs: (last 7 days)     09/12/23  0000   INR 2.3       ASSESSMENT     Source(s): Chart Review  Previous INR was Subtherapeutic  Medication, diet, health changes since last INR chart reviewed; none identified  Maintenance dose was increased on 9/5/23         PLAN         Dosing Instructions: Continue your current warfarin dose with next INR in 1 week       Summary  As of 9/12/2023      Full warfarin instructions:  3.75 mg every Tue; 2.5 mg all other days   Next INR check:  9/19/2023               Detailed voice message left for Ulises with dosing instructions and follow up date.     Patient to recheck with home meter    Education provided:   Please call back if any changes to your diet, medications or how you've been taking warfarin  Contact [phone]  with any changes, questions or concerns.     Plan made per [facility] anticoagulation protocol    Deepa Godoy RN  Anticoagulation Clinic  9/12/2023    _______________________________________________________________________     Anticoagulation Episode Summary       Current INR goal:  2.0-3.0   TTR:  80.5 % (11.6 mo)   Target end date:  Indefinite   Send INR reminders to:  [facility]    Indications    Persistent Atrial Fibrillation (Resolved) [I48.91]  Paroxysmal atrial fibrillation (H) [I48.0]             Comments:  Home monitor ( [employer] )managed by exception             Anticoagulation Care Providers       Provider Role Specialty Phone number    Erica Hansen APRN CNP Referring Cardiovascular Disease [phone]    Domo Garrett MD Referring Cardiovascular Disease [phone]    Everett Renee MD  Cardiovascular Disease [phone]

## 2023-09-19 NOTE — PROGRESS NOTES
ANTICOAGULATION  MANAGEMENT-Home Monitor Managed by Exception    Buck Sinclair 78 year old male is on warfarin with therapeutic INR result. (Goal INR 2.0-3.0)    Recent labs: (last 7 days)     09/19/23  0000   INR 2.5       Previous INR was Therapeutic  Medication, diet, health changes since last INR:chart reviewed; none identified  Contacted within the last 12 weeks by phone on 9/5/23      LUCAS Jane was NOT contacted regarding therapeutic result today per home monitoring policy manage by exception agreement.   Current warfarin dose is to be continued:     Summary  As of 9/19/2023      Full warfarin instructions:  3.75 mg every Tue; 2.5 mg all other days   Next INR check:  9/26/2023             ?   Leana Nayak RN  Anticoagulation Clinic  9/19/2023    _______________________________________________________________________     Anticoagulation Episode Summary       Current INR goal:  2.0-3.0   TTR:  80.5 % (11.6 mo)   Target end date:  Indefinite   Send INR reminders to:  [facility]    Indications    Persistent Atrial Fibrillation (Resolved) [I48.91]  Paroxysmal atrial fibrillation (H) [I48.0]  Persistent atrial fibrillation (H) [I48.19]             Comments:  Home monitor ( [employer] )managed by exception             Anticoagulation Care Providers       Provider Role Specialty Phone number    Erica Hansen APRN CNP Referring Cardiovascular Disease [phone]    Domo Garrett MD Referring Cardiovascular Disease [phone]    Everett Renee MD  Cardiovascular Disease [phone]

## 2023-09-26 NOTE — PROGRESS NOTES
ANTICOAGULATION  MANAGEMENT-Home Monitor Managed by Exception    Buck Sinclair 78 year old male is on warfarin with therapeutic INR result. (Goal INR 2.0-3.0)    Recent labs: (last 7 days)     09/26/23  0000   INR 2.9       Previous INR was Therapeutic  Medication, diet, health changes since last INR:chart reviewed; none identified  Contacted within the last 12 weeks by phone on 9/5/2023      LUCAS Jane was NOT contacted regarding therapeutic result today per home monitoring policy manage by exception agreement.   Current warfarin dose is to be continued:     Summary  As of 9/26/2023      Full warfarin instructions:  3.75 mg every Tue; 2.5 mg all other days   Next INR check:  10/3/2023             ?   Nasra Oliveira RN  Anticoagulation Clinic  9/26/2023    _______________________________________________________________________     Anticoagulation Episode Summary       Current INR goal:  2.0-3.0   TTR:  80.5 % (11.6 mo)   Target end date:  Indefinite   Send INR reminders to:  [facility]    Indications    Persistent Atrial Fibrillation (Resolved) [I48.91]  Paroxysmal atrial fibrillation (H) [I48.0]  Persistent atrial fibrillation (H) [I48.19]             Comments:  Home monitor ( [employer] )managed by exception             Anticoagulation Care Providers       Provider Role Specialty Phone number    Erica Hansen APRN CNP Referring Cardiovascular Disease [phone]    Domo Garrett MD Referring Cardiovascular Disease [phone]    Everett Renee MD  Cardiovascular Disease [phone]

## 2023-09-28 NOTE — LETTER
9/28/2023         RE: Buck Sinclair  [address] [place] [address] [address] [place] [address]        Dear Colleague,    Thank you for referring your patient, Buck Sinclair, to the [facility]. Please see a copy of my visit note below.    Pulmonary Clinic Follow-up Visit    Assessment/Plan:  78M with a history of tobacco dependence in remission, CAD s/p PCI/stents, afib s/p PVL with ICD/PPM on anticoagulation, history of cavitary lesion and extensive pneumonia in LLL in Oct 2017, subsequent recurrent LLL pneumonia, since resolved, presenting for hospital follow up. Repeat PFT's in 2020 actually showed improved FEV1/FVC ratio, stable FEV1 but substantial worsening of the DLco (almost 40% lower than in 2018). Last PFTs in 10/2022 showed a stable FEV1 but DLco is again decreased at 34% when not corrected for hemoglobin.   Recent hospitalization for volume overload, acute hypoxic respiratory failure, and COPD exacerbation.  Diuretics managed by Dr. Garrett and CHF clinicly, recent changed from furosemide to Bumex.      RHC data showed mPAP of 37 mm Hg, normal PCWP, CO 5.7 L/min, TPG of 21 and PVR of 4.7 BLAIR.     Plan:  #COPD, GOLD class E (CAT >10, recent hospitalization). Minimal smoking history so this is probably due to his work as a . PFTs 2017 showed mild obstruction and normal DLco. Mild exertional hypoxemia noted on 6MWT in 2019. Now on supplemental oxygen as of 2020.  - continue budesonide-formoterol 160-4.5 mcg two inhalations BID with spacer; rinse/gargle/spit water after use.  - continue tiotropium HandiHaler one inhalation daily  - Cont albuterol as needed  - encouraged exercise, weight loss as able  - continue continuous O2 for goal O2 sat 88-92%   - declines pulmonary rehab  - no indication for LDCT as his smoking history is too minimal. Recent CT did not show suspicious lung findings.    #exertional hypoxemia, worsening DLco in 2021, significant LIMA: likely due to pulm HTN.  RHC Jan 2022: mPAP of 37 mm Hg, PCWP 14, CO 5.7 L/min, TPG of 23, DPG 10, and PVR of 4.1 BLAIR. as above c/w group 1 PH; DPG >7 suggests combined pre- and post-capillary PH with contribution from left-heard disease as well; overall WHO group I, II and III PH likely. Echo 05/2023 shows severe PH, normal EF, only lateral E/e' reported (it was normal); diastolic function thus indeterminate from that echo; but very likely he had incr LAP and diastolic dysfunction. E/A was >5 on that echo; markedly abnormal (however he was very volume overloaded and in decompensated CHF at that point). Significant worsening of DLco over the last 2 years. He is oxygen dependent.  - continue follow up with Dr. Garrett, CHF clinic as above  - continue bumex per Dr. Garrett in the heart failure clinic   - strict attention to low salt diet and fluid restriction  - CHF precautions reinforced.   - continue oxygen as above      #nocturnal hypoxemia:  - wearing oxygen at night.   - previous sleep study in 2015 showed AHI of 1.4.  - patient previously declined sleep medicine referral; would not accept CPAP.     #RHM:  - flu shot today  - PCV20 today. UTD with PCV13 + PSV23 (2015)  - should get covid booster; he's uncertain. Defer to PMD  - eligible for RSV vaccine defer to PMD.      Follow up in 6 months.     Hugh Payton MD (Avi)  [facility] ([facility])  Clinic [phone]  Fax [phone]       CCx: follow up of COPD GOLD class D, chronic hypoxemic respiratory failure requiring supplemental oxygen, CHF with volume overload.    HPI:  Interim history: Buck was last seen by Flakita Slaughter CNP on 6/15. Since that time, he reports he's doing OK. Now on Jardiance. Volume status and weight are stable. Using 4-6Lpm O2, SpO2 low to mid 90s. No chest pain.  Peripheral edema persists but is stable, and much better than last May when he went into the hospital.     ROS:  A 10-system review was obtained and was negative  with the exception of the symptoms endorsed in the history of present illness.    PMH:  Past Medical History:   Diagnosis Date     Anemia      Asthma without status asthmaticus 05/05/2021     Atrial fibrillation (H)      BPH (benign prostatic hyperplasia)      Cardiomyopathy (H)      CKD (chronic kidney disease) stage 3, GFR 30-59 ml/min (H) 05/24/2023     Congestive heart failure (H)      COPD, group B, by GOLD 2017 classification (H)      Coronary artery disease due to calcified coronary lesion      Dyslipidemia, goal LDL below 70      Essential hypertension      Heart failure with reduced ejection fraction (H) 08/17/2023     Hemoptysis 10/04/2017     History of transfusion      Hyperlipidemia      Persistent atrial fibrillation (H)      Pneumonia of left lower lobe due to infectious organism 10/04/2017     Pulmonary hypertension (H)      Skin cancer of trunk      Status post catheter ablation of atrial fibrillation 06/07/2017    PVI 4-2011 (Cryo/PVI + roof line + CTI line) Re-do PVI 7-2011 (RFA/PVI + CFE + VIDYA + confirmed CTI line)     Ventricular tachycardia (H)        PSH:  Past Surgical History:   Procedure Laterality Date     CARDIAC DEFIBRILLATOR PLACEMENT       CARDIOVERSION  07/11/2018    x20, last 2/12/15, 10/2015, 11/18/16, 6/16/17 by Lauren Foster CNP     CARDIOVERSION  07/11/2018     CARDIOVERSION  11/19/2021     COLONOSCOPY N/A 04/28/2017    Procedure: COLONOSCOPY with 2 ascending polyps and 1 transverse polyp;  Surgeon: Jose Whittington MD;  Location: [facility];  Service:      CORONARY ANGIOGRAPHY ADULT ORDER       CV CORONARY ANGIOGRAM N/A 09/20/2017    Procedure: Coronary Angiogram;  Surgeon: Sergio Cervantes MD;  Location: [facility] Cath Lab;  Service:      CV CORONARY ANGIOGRAM N/A 01/24/2022    Procedure: Coronary Angiogram;  Surgeon: Christi Saunders MD;  Location: [facility] CATH LAB CV     CV LEFT HEART CATH N/A 01/24/2022    Procedure: Left Heart Cath;  Surgeon: Christi Saunders MD;   Location: [facility] CATH LAB CV     CV RIGHT AND LEFT HEART CATH N/A 01/24/2022    Procedure: Right and Left Heart Catherization;  Surgeon: Christi Saunders MD;  Location: [facility] CATH LAB CV     EP ICD GENERATOR REPLACEMENT DUAL N/A 10/28/2022    Procedure: Implantable Cardioverter Defibrillator Generator Replacement Dual;  Surgeon: Elo Collins MD;  Location: [facility] CATH [facility] CV     EP ICD INSERT       FRACTURE SURGERY Left     wrist     HEART CATH, ANGIOPLASTY       IMPLANT AUTOMATIC IMPLANTABLE CARDIOVERTER DEFIBRILLATOR       INGUINAL HERNIA REPAIR Left 1967    while in the [employer] in [employer] after 13 month in [place]     INSERT / REPLACE / REMOVE PACEMAKER       IR MISCELLANEOUS PROCEDURE  04/30/2014     OTHER SURGICAL HISTORY      left hand surgery---tendon repair     [place] ABLATE HEART DYSRHYTHM FOCUS  04/2011    Catheter Ablation Atrial Fibrillation PVI Apr 2011 (Cryo+RF-PVI + roof line + CTI line)     [place] ABLATE HEART DYSRHYTHM FOCUS  07/2011    Re-do PVI Jul 2011 (RFA-PVI + CFE + VIDYA + confirmation of CTI line)     TOTAL SHOULDER REPLACEMENT Right 03/03/2016    Dr. Abernathy of [facility]     WRIST SURGERY Left      ZZC MYERS W/O FACETEC FORAMOT/DSKC 1/2 VRT SEG, CERVICAL      Laminectomy Lumbar;  Recorded: 03/09/2012;       Allergies:     Allergies   Allergen Reactions     Adhesive Tape Other (See Comments)     ADHESIVE TAPE; SKIN IRRITATION; Skin pulled off with foam tape       Amiodarone      ADVERSE REACTION.  Sunlight sensitivity.     Lisinopril          Family HX:  Family History   Problem Relation Age of Onset     Cancer Mother         leukemia     Cancer Father         bladder     Cancer Sister         breast with lung met.     Aneurysm Sister      CABG Brother      CABG Brother      Valvular heart disease Brother         valve replacement       Social Hx:  Social History     Socioeconomic History     Marital status:      Spouse name: Neela     Number of children: Not on file      Years of education: 12     Highest education level: Not on file   Occupational History     Occupation:      Employer: RETIRED     Occupation:  police     Comment: [place]   Social Needs     Financial resource strain: Not on file     Food insecurity     Worry: Not on file     Inability: Not on file     Transportation needs     Medical: Not on file     Non-medical: Not on file   Tobacco Use     Smoking status: Former Smoker     Packs/day: 1.00     Years: 4.00     Pack years: 4.00     Types: Cigarettes     Quit date: 1968     Years since quittin.3     Smokeless tobacco: Never Used   Substance and Sexual Activity     Alcohol use: Yes     Alcohol/week: 2.0 standard drinks     Types: 2 Cans of beer per week     Comment: 1 beer per week     Drug use: No     Sexual activity: Yes     Partners: Female     Birth control/protection: Post-menopausal   Lifestyle     Physical activity     Days per week: Not on file     Minutes per session: Not on file     Stress: Not on file   Relationships     Social connections     Talks on phone: Not on file     Gets together: Not on file     Attends Oriental orthodox service: Not on file     Active member of club or organization: Not on file     Attends meetings of clubs or organizations: Not on file     Relationship status: Not on file     Intimate partner violence     Fear of current or ex partner: Not on file     Emotionally abused: Not on file     Physically abused: Not on file     Forced sexual activity: Not on file   Other Topics Concern     Not on file   Social History Narrative     Not on file       Current Meds:  Current Outpatient Medications   Medication     acetaminophen (TYLENOL) 325 MG tablet     albuterol (PROAIR HFA/PROVENTIL HFA/VENTOLIN HFA) 108 (90 Base) MCG/ACT inhaler     Ascorbic Acid (VITAMIN C) 500 MG CAPS     aspirin (ASA) 81 MG EC tablet     atorvastatin (LIPITOR) 40 MG tablet     budesonide-formoterol (SYMBICORT) 160-4.5 MCG/ACT Inhaler      bumetanide (BUMEX) 2 MG tablet     co-enzyme Q-10 100 MG CAPS capsule     empagliflozin (JARDIANCE) 10 MG TABS tablet     fish oil-omega-3 fatty acids 1000 MG capsule     FLUoxetine (PROZAC) 20 MG capsule     MAG64 64 MG TBEC CR tablet     multivitamin, therapeutic (THERA-VIT) TABS tablet     nitroGLYcerin (NITROSTAT) 0.4 MG sublingual tablet     OXYGEN-HELIUM IN     polyethylene glycol (MIRALAX) 17 GM/Dose powder     sotalol (BETAPACE) 120 MG tablet     tiotropium (SPIRIVA) 18 MCG inhaled capsule     vitamin D2 (ERGOCALCIFEROL) 41089 units (1250 mcg) capsule     warfarin ANTICOAGULANT (COUMADIN) 2.5 MG tablet     No current facility-administered medications for this visit.       Physical Exam:  There were no vitals taken for this visit.Gen: alert, oriented, no distress    There were no vitals taken for this visit.   Gen: alert, oriented, no distress  HEENT: nasal turbinates are unremarkable, no oropharyngeal lesions, no cervical or supraclavicular lymphadenopathy  CV: irreg irreg, no M/G/R  Resp: clear lungs.  Abd: soft, nontender, no palpable organomegaly  Skin: no apparent rashes  Ext: 1+ pitting edema. Sig improvement from last visit.   Neuro: alert, nonfocal    Labs:  Reviewed  Dec 2018  Chem panel wnl  TSH wnl  hgb 12.1 Oct 2018    Previous testing  ODNNA neg  blasto neg  Histo testing neg  c-ANCA neg  HIV neg  RF neg    Bronch/LLL BAL cultures neg    Imaging studies:  CT CHEST W/O CONTRAST, DATE/TIME: 5/19/2023 10:38 AM CDT                                                              IMPRESSION:   1.  Unchanged bibasilar peripheral parenchymal scarring. No acute lung, airway, or pleural space abnormality.  2.  Biatrial and right ventricular heart enlargement. Dilated central pulmonary arteries. Findings are consistent with acute pulmonary hypertension. Calcification in the walls of the left atrium likely from prior ablation procedure.    Echo Dec 2021  Interpretation Summary     1. Left ventricular systolic  function is normal. The left ventricle is normal  in size. There is normal left ventricular wall thickness.Left ventricular  diastolic function is normal. No regional wall motion abnormalities noted.  2. The right ventricle is mildly dilated. Mildly decreased right ventricular  systolic functio.  3. There is mild to moderate (1-2+) tricuspid regurgitation.  4. The right ventricular systolic pressure is approximated at 45.7mmHg plus  the right atrial pressure.Right ventricular systolic pressure is elevated,  consistent with moderate pulmonary hypertension. Normal RA pressure of 3 mmHg.  6. The ascending aorta is mildly dilated at 42 mm.    RHC 1/24/2022  Exertional dyspnea in a patient with known CAD and severe COPD.  Mild coronary artery disease with patent LAD stents.  LVEDP and PCWP are normal. The PA pressure was elevated at 66/24 mmHg with a mean pressure of 37 mmHg. See numbers below.  Shikha and TD cardiac outputs were both 5.7 l/min.  Sats on 2L NC oxygen: Ao 93%, PA 60%, RA 59%        PFTs 2018  FEV1/FVC is 0.56 and is reduced.  FEV1 is 73% predicted and is reduced.  FVC is 96% predicted and normal.  There was no improvement in spirometry after a single inhaled dose of bronchodilator.  TLC is 99% predicted and is normal.  RV is 113% predicted and is normal.  DLCO is 93% predicted and is normal when it   is corrected for hemoglobin.  Impression:  Full Pulmonary Function Test is abnormal.  PFTs are consistent with mild obstructive disease.  Spirometry is not consistent with reversibility.  There is no hyperinflation.  There is no air-trapping.  Diffusion capacity when corrected for hemoglobin is normal.  Declines in both FEV1 and TLC since 2009 with overall preservation of diffusing capacity.    PFTs 11/10/2021  FEV1 2.42L 68%  FVC 76%  No BD response  Ratio 0.66 (LLN 0.6)  DLco 46% han for hgb  Flow vol curve suggests obstruction.  Impression: no obstruction based on ratio >LLN but FV curve does suggest some  obstruction. Significant decline in DLco compared to DLco.     PFTs 10/27/2022  FEV1 1.98L 57%  FVC 3.23L 68%  FEV1/FVC 61%  DLCO not corrected 34%  The FVC and FEV1 are reduced, but the FEV1/FVC ratio is within normal limits.  The FEV1/FEV6 ratio is reduced.  The inspiratory flow rates are within normal limits.  The TLC is reduced.  The diffusing capacity is reduced.  However, the diffusing capacity was not corrected for the patient's hemoglobin.   IMPRESSION:   Mixed Moderately severe airflow obstruction and restriction.   Severe diffusion defect.  The diffusing capacity was not corrected for the patient's hemoglobin.     July 2019  SIX MINUTE WALK TEST / HOME O2 EVALUATION  SpO2 at rest on RA 96%  SpO2 started to drop after 2 1/2 minutes walking. SpO2 after walking 2 1/2 minutes on RA was 88%  SpO2 after walking 6 minutes on RA was 87%  Distance covered 320.04 meters.  Recovery phase, SpO2 after 1 minute rest on RA was 87%  Recovery phase, SpO2 after 2 minute rest on RA was 97%  Impression:   Significant desaturation with activities.   Patient does qualify for O2 supplementation with activity.    Sleep Study 5/10/2015  Townsend Sleepiness Score =  17  Mallampati Class =   4  Neck Circumference =   17.75 inches  BMI =      32.9  STOP-BANG Scale =   6/8  Primary Findings:  1- Respiratory monitoring showed intermittent snoring but overall no significant obstructive sleep apnea/hypopnea sufficient to disturb sleep (AHI=1.4).  2- Supine sleep was not sampled during the study but the patient does not usually sleep in this position due to shoulder problems.   Other Findings:  Sleep Architecture:   - Sleep onset latency was normal.  - REM onset latency was prolonged.  - All stages of sleep were sampled during the test.  Respiration:  - Mean hemoglobin oxygen saturation (SaO2) during the diagnostic portion of the PSG in NREM and REM sleep was 94% with a SaO2 desaturation eri observed to 89%.  - Sleep-related Hypoxemia  was not present.  Movements:      - The patient had a Periodic Limb Movement (PLM) index of 146.8 and the MYRNA PLM index was 6.4  Parasomnia:  - No activity consistent with parasomnia was observed in the video recording.  Electrocardiogram:  - Two-lead ECG showed atrial fibrillation with PVCs.  Electroencephalogram:  - We used a limited-montage EEG with F3, F4, C3, C4, O1, O2 and contra-lateral references to M1 and M2 that was reviewed using a 30-second EPOCH. No EEG abnormalities were observed with these settings.  Diagnosis:  -   780.54 Hypersomnia unspecified.  Assessment & Recommendation:   The results from this study are not sufficient to explain the patient s hypersomnia. Measures aimed at reducing upper airway resistance are recommended for the degree of sleep-disordered breathing that was observed in this study. Options include: positional therapy to avoid sleep in the supine posture, ENT evaluation for surgery, and dental evaluation for an oral appliance. Patients with excessive daytime sleepiness are at increased risk for motor vehicle accidents.  Shivam Saul MD  [facility]           Again, thank you for allowing me to participate in the care of your patient.        Sincerely,        Hugh Payton MD

## 2023-09-28 NOTE — PROGRESS NOTES
Pulmonary Clinic Follow-up Visit    Assessment/Plan:  78M with a history of tobacco dependence in remission, CAD s/p PCI/stents, afib s/p PVL with ICD/PPM on anticoagulation, history of cavitary lesion and extensive pneumonia in LLL in Oct 2017, subsequent recurrent LLL pneumonia, since resolved, presenting for hospital follow up. Repeat PFT's in 2020 actually showed improved FEV1/FVC ratio, stable FEV1 but substantial worsening of the DLco (almost 40% lower than in 2018). Last PFTs in 10/2022 showed a stable FEV1 but DLco is again decreased at 34% when not corrected for hemoglobin.   Recent hospitalization for volume overload, acute hypoxic respiratory failure, and COPD exacerbation.  Diuretics managed by Dr. Garrett and CHF clinicly, recent changed from furosemide to Bumex.      RHC data showed mPAP of 37 mm Hg, normal PCWP, CO 5.7 L/min, TPG of 21 and PVR of 4.7 BLAIR.     Plan:  #COPD, GOLD class E (CAT >10, recent hospitalization). Minimal smoking history so this is probably due to his work as a . PFTs 2017 showed mild obstruction and normal DLco. Mild exertional hypoxemia noted on 6MWT in 2019. Now on supplemental oxygen as of 2020.  - continue budesonide-formoterol 160-4.5 mcg two inhalations BID with spacer; rinse/gargle/spit water after use.  - continue tiotropium HandiHaler one inhalation daily  - Cont albuterol as needed  - encouraged exercise, weight loss as able  - continue continuous O2 for goal O2 sat 88-92%   - declines pulmonary rehab  - no indication for LDCT as his smoking history is too minimal. Recent CT did not show suspicious lung findings.    #exertional hypoxemia, worsening DLco in 2021, significant LIMA: likely due to pulm HTN. RHC Jan 2022: mPAP of 37 mm Hg, PCWP 14, CO 5.7 L/min, TPG of 23, DPG 10, and PVR of 4.1 BLAIR. 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 Echo 05/2023 shows severe PH, normal EF, only lateral E/e' reported (it was normal); diastolic function thus indeterminate from that echo; but very likely he had incr LAP and diastolic dysfunction. E/A was >5 on that echo; markedly abnormal (however he was very volume overloaded and in decompensated CHF at that point). Significant worsening of DLco over the last 2 years. He is oxygen dependent.  - continue follow up with Dr. Garrett, CHF clinic as above  - continue bumex per Dr. Garrett in the heart failure clinic   - strict attention to low salt diet and fluid restriction  - CHF precautions reinforced.   - continue oxygen as above      #nocturnal hypoxemia:  - wearing oxygen at night.   - previous sleep study in 2015 showed AHI of 1.4.  - patient previously declined sleep medicine referral; would not accept CPAP.     #RHM:  - flu shot today  - PCV20 today. UTD with PCV13 + PSV23 (2015)  - should get covid booster; he's uncertain. Defer to PMD  - eligible for RSV vaccine defer to PMD.      Follow up in 6 months.     Hugh Payton MD (Avi)  [facility] Pulmonary & Critical Care ([facility])  Clinic [phone]  Fax [phone]       CCx: follow up of COPD GOLD class D, chronic hypoxemic respiratory failure requiring supplemental oxygen, CHF with volume overload.    HPI:  Interim history: Buck was last seen by Flakita Slaughter CNP on 6/15. Since that time, he reports he's doing OK. Now on Jardiance. Volume status and weight are stable. Using 4-6Lpm O2, SpO2 low to mid 90s. No chest pain.  Peripheral edema persists but is stable, and much better than last May when he went into the hospital.     ROS:  A 10-system review was obtained and was negative with the exception of the symptoms endorsed in the history of present illness.    PMH:  Past Medical History:   Diagnosis Date    Anemia     Asthma without status asthmaticus 05/05/2021    Atrial fibrillation (H)     BPH (benign prostatic hyperplasia)      Cardiomyopathy (H)     CKD (chronic kidney disease) stage 3, GFR 30-59 ml/min (H) 05/24/2023    Congestive heart failure (H)     COPD, group B, by GOLD 2017 classification (H)     Coronary artery disease due to calcified coronary lesion     Dyslipidemia, goal LDL below 70     Essential hypertension     Heart failure with reduced ejection fraction (H) 08/17/2023    Hemoptysis 10/04/2017    History of transfusion     Hyperlipidemia     Persistent atrial fibrillation (H)     Pneumonia of left lower lobe due to infectious organism 10/04/2017    Pulmonary hypertension (H)     Skin cancer of trunk     Status post catheter ablation of atrial fibrillation 06/07/2017    PVI 4-2011 (Cryo/PVI + roof line + CTI line) Re-do PVI 7-2011 (RFA/PVI + CFE + VIDYA + confirmed CTI line)    Ventricular tachycardia (H)        PSH:  Past Surgical History:   Procedure Laterality Date    CARDIAC DEFIBRILLATOR PLACEMENT      CARDIOVERSION  07/11/2018    x20, last 2/12/15, 10/2015, 11/18/16, 6/16/17 by Lauren Foster CNP    CARDIOVERSION  07/11/2018    CARDIOVERSION  11/19/2021    COLONOSCOPY N/A 04/28/2017    Procedure: COLONOSCOPY with 2 ascending polyps and 1 transverse polyp;  Surgeon: Joes Whittington MD;  Location: [facility] GI;  Service:     CORONARY ANGIOGRAPHY ADULT ORDER      CV CORONARY ANGIOGRAM N/A 09/20/2017    Procedure: Coronary Angiogram;  Surgeon: Sergio Cervantes MD;  Location: [facility] Cath Lab;  Service:     CV CORONARY ANGIOGRAM N/A 01/24/2022    Procedure: Coronary Angiogram;  Surgeon: Christi Saunders MD;  Location: [facility] LAB CV    CV LEFT HEART CATH N/A 01/24/2022    Procedure: Left Heart Cath;  Surgeon: Christi Saunders MD;  Location: [facility] CATH LAB CV    CV RIGHT AND LEFT HEART CATH N/A 01/24/2022    Procedure: Right and Left Heart Catherization;  Surgeon: Christi Saunders MD;  Location: [facility] LAB CV    EP ICD GENERATOR REPLACEMENT DUAL N/A 10/28/2022    Procedure: Implantable Cardioverter  Defibrillator Generator Replacement Dual;  Surgeon: Elo Collins MD;  Location: [facility] CATH LAB CV    EP ICD INSERT      FRACTURE SURGERY Left     wrist    HEART CATH, ANGIOPLASTY      IMPLANT AUTOMATIC IMPLANTABLE CARDIOVERTER DEFIBRILLATOR      INGUINAL HERNIA REPAIR Left 1967    while in the [employer] in [place] after 13 month in [place]    INSERT / REPLACE / REMOVE PACEMAKER      IR MISCELLANEOUS PROCEDURE  04/30/2014    OTHER SURGICAL HISTORY      left hand surgery---tendon repair    CT ABLATE HEART DYSRHYTHM FOCUS  04/2011    Catheter Ablation Atrial Fibrillation PVI Apr 2011 (Cryo+RF-PVI + roof line + CTI line)    CT ABLATE HEART DYSRHYTHM FOCUS  07/2011    Re-do PVI Jul 2011 (RFA-PVI + CFE + VIDYA + confirmation of CTI line)    TOTAL SHOULDER REPLACEMENT Right 03/03/2016    Dr. Abernathy of [facility]    WRIST SURGERY Left     ZZC MYERS W/O FACETEC FORAMOT/DSKC 1/2 VRT SEG, CERVICAL      Laminectomy Lumbar;  Recorded: 03/09/2012;       Allergies:     Allergies   Allergen Reactions    Adhesive Tape Other (See Comments)     ADHESIVE TAPE; SKIN IRRITATION; Skin pulled off with foam tape      Amiodarone      ADVERSE REACTION.  Sunlight sensitivity.    Lisinopril          Family HX:  Family History   Problem Relation Age of Onset    Cancer Mother         leukemia    Cancer Father         bladder    Cancer Sister         breast with lung met.    Aneurysm Sister     CABG Brother     CABG Brother     Valvular heart disease Brother         valve replacement       Social Hx:  Social History     Socioeconomic History    Marital status:      Spouse name: Neela    Number of children: Not on file    Years of education: 12    Highest education level: Not on file   Occupational History    Occupation:      Employer: RETIRED    Occupation:  police     Comment: [place]   Social Needs    Financial resource strain: Not on file    Food insecurity     Worry: Not on file     Inability: Not on  file    Transportation needs     Medical: Not on file     Non-medical: Not on file   Tobacco Use    Smoking status: Former Smoker     Packs/day: 1.00     Years: 4.00     Pack years: 4.00     Types: Cigarettes     Quit date: 1968     Years since quittin.3    Smokeless tobacco: Never Used   Substance and Sexual Activity    Alcohol use: Yes     Alcohol/week: 2.0 standard drinks     Types: 2 Cans of beer per week     Comment: 1 beer per week    Drug use: No    Sexual activity: Yes     Partners: Female     Birth control/protection: Post-menopausal   Lifestyle    Physical activity     Days per week: Not on file     Minutes per session: Not on file    Stress: Not on file   Relationships    Social connections     Talks on phone: Not on file     Gets together: Not on file     Attends [employer]al service: Not on file     Active member of club or organization: Not on file     Attends meetings of clubs or organizations: Not on file     Relationship status: Not on file    Intimate partner violence     Fear of current or ex partner: Not on file     Emotionally abused: Not on file     Physically abused: Not on file     Forced sexual activity: Not on file   Other Topics Concern    Not on file   Social History Narrative    Not on file       Current Meds:  Current Outpatient Medications   Medication    acetaminophen (TYLENOL) 325 MG tablet    albuterol (PROAIR HFA/PROVENTIL HFA/VENTOLIN HFA) 108 (90 Base) MCG/ACT inhaler    Ascorbic Acid (VITAMIN C) 500 MG CAPS    aspirin (ASA) 81 MG EC tablet    atorvastatin (LIPITOR) 40 MG tablet    budesonide-formoterol (SYMBICORT) 160-4.5 MCG/ACT Inhaler    bumetanide (BUMEX) 2 MG tablet    co-enzyme Q-10 100 MG CAPS capsule    empagliflozin (JARDIANCE) 10 MG TABS tablet    fish oil-omega-3 fatty acids 1000 MG capsule    FLUoxetine (PROZAC) 20 MG capsule    MAG64 64 MG TBEC CR tablet    multivitamin, therapeutic (THERA-VIT) TABS tablet    nitroGLYcerin (NITROSTAT) 0.4 MG sublingual tablet     OXYGEN-HELIUM IN    polyethylene glycol (MIRALAX) 17 GM/Dose powder    sotalol (BETAPACE) 120 MG tablet    tiotropium (SPIRIVA) 18 MCG inhaled capsule    vitamin D2 (ERGOCALCIFEROL) 95497 units (1250 mcg) capsule    warfarin ANTICOAGULANT (COUMADIN) 2.5 MG tablet     No current facility-administered medications for this visit.       Physical Exam:  There were no vitals taken for this visit.Gen: alert, oriented, no distress    There were no vitals taken for this visit.   Gen: alert, oriented, no distress  HEENT: nasal turbinates are unremarkable, no oropharyngeal lesions, no cervical or supraclavicular lymphadenopathy  CV: irreg irreg, no M/G/R  Resp: clear lungs.  Abd: soft, nontender, no palpable organomegaly  Skin: no apparent rashes  Ext: 1+ pitting edema. Sig improvement from last visit.   Neuro: alert, nonfocal    Labs:  Reviewed  Dec 2018  Chem panel wnl  TSH wnl  hgb 12.1 Oct 2018    Previous testing  DONNA neg  blasto neg  Histo testing neg  c-ANCA neg  HIV neg  RF neg    Bronch/LLL BAL cultures neg    Imaging studies:  CT CHEST W/O CONTRAST, DATE/TIME: 5/19/2023 10:38 AM CDT                                                              IMPRESSION:   1.  Unchanged bibasilar peripheral parenchymal scarring. No acute lung, airway, or pleural space abnormality.  2.  Biatrial and right ventricular heart enlargement. Dilated central pulmonary arteries. Findings are consistent with acute pulmonary hypertension. Calcification in the walls of the left atrium likely from prior ablation procedure.    Echo Dec 2021  Interpretation Summary     1. Left ventricular systolic function is normal. The left ventricle is normal  in size. There is normal left ventricular wall thickness.Left ventricular  diastolic function is normal. No regional wall motion abnormalities noted.  2. The right ventricle is mildly dilated. Mildly decreased right ventricular  systolic functio.  3. There is mild to moderate (1-2+) tricuspid  regurgitation.  4. The right ventricular systolic pressure is approximated at 45.7mmHg plus  the right atrial pressure.Right ventricular systolic pressure is elevated,  consistent with moderate pulmonary hypertension. Normal RA pressure of 3 mmHg.  6. The ascending aorta is mildly dilated at 42 mm.    RHC 1/24/2022  Exertional dyspnea in a patient with known CAD and severe COPD.  Mild coronary artery disease with patent LAD stents.  LVEDP and PCWP are normal. The PA pressure was elevated at 66/24 mmHg with a mean pressure of 37 mmHg. See numbers below.  Shikha and TD cardiac outputs were both 5.7 l/min.  Sats on 2L NC oxygen: Ao 93%, PA 60%, RA 59%        PFTs 2018  FEV1/FVC is 0.56 and is reduced.  FEV1 is 73% predicted and is reduced.  FVC is 96% predicted and normal.  There was no improvement in spirometry after a single inhaled dose of bronchodilator.  TLC is 99% predicted and is normal.  RV is 113% predicted and is normal.  DLCO is 93% predicted and is normal when it   is corrected for hemoglobin.  Impression:  Full Pulmonary Function Test is abnormal.  PFTs are consistent with mild obstructive disease.  Spirometry is not consistent with reversibility.  There is no hyperinflation.  There is no air-trapping.  Diffusion capacity when corrected for hemoglobin is normal.  Declines in both FEV1 and TLC since 2009 with overall preservation of diffusing capacity.    PFTs 11/10/2021  FEV1 2.42L 68%  FVC 76%  No BD response  Ratio 0.66 (LLN 0.6)  DLco 46% han for hgb  Flow vol curve suggests obstruction.  Impression: no obstruction based on ratio >LLN but FV curve does suggest some obstruction. Significant decline in DLco compared to DLco.     PFTs 10/27/2022  FEV1 1.98L 57%  FVC 3.23L 68%  FEV1/FVC 61%  DLCO not corrected 34%  The FVC and FEV1 are reduced, but the FEV1/FVC ratio is within normal limits.  The FEV1/FEV6 ratio is reduced.  The inspiratory flow rates are within normal limits.  The TLC is reduced.  The  diffusing capacity is reduced.  However, the diffusing capacity was not corrected for the patient's hemoglobin.   IMPRESSION:   Mixed Moderately severe airflow obstruction and restriction.   Severe diffusion defect.  The diffusing capacity was not corrected for the patient's hemoglobin.     July 2019  SIX MINUTE WALK TEST / HOME O2 EVALUATION  SpO2 at rest on RA 96%  SpO2 started to drop after 2 1/2 minutes walking. SpO2 after walking 2 1/2 minutes on RA was 88%  SpO2 after walking 6 minutes on RA was 87%  Distance covered 320.04 meters.  Recovery phase, SpO2 after 1 minute rest on RA was 87%  Recovery phase, SpO2 after 2 minute rest on RA was 97%  Impression:   Significant desaturation with activities.   Patient does qualify for O2 supplementation with activity.    Sleep Study 5/10/2015  [place] Sleepiness Score =  17  Mallampati Class =   4  Neck Circumference =   17.75 inches  BMI =      32.9  STOP-BANG Scale =   6/8  Primary Findings:  1- Respiratory monitoring showed intermittent snoring but overall no significant obstructive sleep apnea/hypopnea sufficient to disturb sleep (AHI=1.4).  2- Supine sleep was not sampled during the study but the patient does not usually sleep in this position due to shoulder problems.   Other Findings:  Sleep Architecture:   - Sleep onset latency was normal.  - REM onset latency was prolonged.  - All stages of sleep were sampled during the test.  Respiration:  - Mean hemoglobin oxygen saturation (SaO2) during the diagnostic portion of the PSG in NREM and REM sleep was 94% with a SaO2 desaturation eri observed to 89%.  - Sleep-related Hypoxemia was not present.  Movements:      - The patient had a Periodic Limb Movement (PLM) index of 146.8 and the MYRNA PLM index was 6.4  Parasomnia:  - No activity consistent with parasomnia was observed in the video recording.  Electrocardiogram:  - Two-lead ECG showed atrial fibrillation with PVCs.  Electroencephalogram:  - We used a  limited-montage EEG with F3, F4, C3, C4, O1, O2 and contra-lateral references to M1 and M2 that was reviewed using a 30-second EPOCH. No EEG abnormalities were observed with these settings.  Diagnosis:  -   780.54 Hypersomnia unspecified.  Assessment & Recommendation:   The results from this study are not sufficient to explain the patient s hypersomnia. Measures aimed at reducing upper airway resistance are recommended for the degree of sleep-disordered breathing that was observed in this study. Options include: positional therapy to avoid sleep in the supine posture, ENT evaluation for surgery, and dental evaluation for an oral appliance. Patients with excessive daytime sleepiness are at increased risk for motor vehicle accidents.  Shivam Saul MD  Staff PhysicianAt[facility] Health Sleep Medicine Services

## 2023-10-03 NOTE — PROGRESS NOTES
ANTICOAGULATION  MANAGEMENT-Home Monitor Managed by Exception    Buck Sinclair 78 year old male is on warfarin with therapeutic INR result. (Goal INR 2.0-3.0)    Recent labs: (last 7 days)     10/03/23  0000   INR 2.9       Previous INR was Therapeutic  Medication, diet, health changes since last INR:chart reviewed; none identified  Contacted within the last 12 weeks by phone on 9/5/23      LUCAS Jane was NOT contacted regarding therapeutic result today per home monitoring policy manage by exception agreement.   Current warfarin dose is to be continued:     Summary  As of 10/3/2023      Full warfarin instructions:  3.75 mg every Tue; 2.5 mg all other days   Next INR check:  10/10/2023             ?   Desire Cortez RN  Anticoagulation Clinic  10/3/2023    _______________________________________________________________________     Anticoagulation Episode Summary       Current INR goal:  2.0-3.0   TTR:  80.5 % (11.6 mo)   Target end date:  Indefinite   Send INR reminders to:  ANTICOAG HOME MONITORING    Indications    Persistent Atrial Fibrillation (Resolved) [I48.91]  Paroxysmal atrial fibrillation (H) [I48.0]  Persistent atrial fibrillation (H) [I48.19]             Comments:  Home monitor ( [employer] )managed by exception             Anticoagulation Care Providers       Provider Role Specialty Phone number    Erica Hansen APRN CNP Referring Cardiovascular Disease [phone]    Domo Garrett MD Referring Cardiovascular Disease [phone]    Everett Renee MD  Cardiovascular Disease [phone]

## 2023-10-17 NOTE — PROGRESS NOTES
ANTICOAGULATION MANAGEMENT     Buck Sinclair 78 year old male is on warfarin with supratherapeutic INR result. (Goal INR 2.0-3.0)    Recent labs: (last 7 days)     10/17/23  0000   INR 3.1*       ASSESSMENT     Source(s): Chart Review and Patient/Caregiver Call     Warfarin doses taken: Warfarin taken as instructed  Diet: No new diet changes identified  Medication/supplement changes: None noted  New illness, injury, or hospitalization: No  Signs or symptoms of bleeding or clotting: No  Previous result: Supratherapeutic  Additional findings: None       PLAN     Recommended plan for no diet, medication or health factor changes affecting INR     Dosing Instructions: decrease your warfarin dose (6.7% change) with next INR in 1 week       Summary  As of 10/17/2023      Full warfarin instructions:  2.5 mg every day   Next INR check:  10/24/2023               Telephone call with Neela who agrees to plan and repeated back plan correctly    Patient to recheck with home meter    Education provided:   Please call back if any changes to your diet, medications or how you've been taking warfarin    Plan made per [facility] anticoagulation protocol    Leana Nayak RN  Anticoagulation Clinic  10/17/2023    _______________________________________________________________________     Anticoagulation Episode Summary       Current INR goal:  2.0-3.0   TTR:  77.5% (11.6 mo)   Target end date:  Indefinite   Send INR reminders to:  ANTICOAG HOME MONITORING    Indications    Persistent Atrial Fibrillation (Resolved) [I48.91]  Paroxysmal atrial fibrillation (H) [I48.0]  Persistent atrial fibrillation (H) [I48.19]             Comments:  Home monitor ( [employer] )managed by exception             Anticoagulation Care Providers       Provider Role Specialty Phone number    Erica Hansen APRN CNP Referring Cardiovascular Disease [phone]    Domo Garrett MD Referring Cardiovascular Disease [phone]    Everett Renee MD   Cardiovascular Disease [phone]

## 2023-10-24 NOTE — PROGRESS NOTES
ANTICOAGULATION MANAGEMENT     Buck Sinclair 78 year old male is on warfarin with therapeutic INR result. (Goal INR 2.0-3.0)    Recent labs: (last 7 days)     10/24/23  0000   INR 2.8       ASSESSMENT     Source(s): Chart Review and Patient/Caregiver Call     Warfarin doses taken: Warfarin taken as instructed  Diet: No new diet changes identified  Medication/supplement changes: None noted  New illness, injury, or hospitalization: No  Signs or symptoms of bleeding or clotting: No  Previous result: Supratherapeutic  Additional findings: None       PLAN     Recommended plan for no diet, medication or health factor changes affecting INR     Dosing Instructions: Continue your current warfarin dose with next INR in 1 week       Summary  As of 10/24/2023      Full warfarin instructions:  2.5 mg every day   Next INR check:  10/31/2023               Telephone call with Neela who agrees to plan and repeated back plan correctly    Patient to recheck with home meter    Education provided:   Please call back if any changes to your diet, medications or how you've been taking warfarin    Plan made per [facility] anticoagulation protocol    Leana Nayak, RN  Anticoagulation Clinic  10/24/2023    _______________________________________________________________________     Anticoagulation Episode Summary       Current INR goal:  2.0-3.0   TTR:  76.8% (11.6 mo)   Target end date:  Indefinite   Send INR reminders to:  ANTICOAG HOME MONITORING    Indications    Persistent Atrial Fibrillation (Resolved) [I48.91]  Paroxysmal atrial fibrillation (H) [I48.0]  Persistent atrial fibrillation (H) [I48.19]             Comments:  Home monitor ( [employer] )managed by exception             Anticoagulation Care Providers       Provider Role Specialty Phone number    Erica Hansen APRN CNP Referring Cardiovascular Disease [phone]    Domo Grarett MD Referring Cardiovascular Disease [phone]    Everett Renee MD  Cardiovascular  [facility] [phone]

## 2023-10-31 NOTE — PROGRESS NOTES
ANTICOAGULATION  MANAGEMENT-Home Monitor Managed by Exception    Buck Sinclair 78 year old male is on warfarin with therapeutic INR result. (Goal INR 2.0-3.0)    Recent labs: (last 7 days)     10/31/23  0000   INR 2.8       Previous INR was Therapeutic  Medication, diet, health changes since last INR:chart reviewed; none identified  Contacted within the last 12 weeks by phone on 10/24/23      LUCAS Jane was NOT contacted regarding therapeutic result today per home monitoring policy manage by exception agreement.   Current warfarin dose is to be continued:     Summary  As of 10/31/2023      Full warfarin instructions:  2.5 mg every day   Next INR check:  11/7/2023             ?   Leana Nayak RN  Anticoagulation Clinic  10/31/2023    _______________________________________________________________________     Anticoagulation Episode Summary       Current INR goal:  2.0-3.0   TTR:  76.8% (11.6 mo)   Target end date:  Indefinite   Send INR reminders to:  ANTICOAG HOME MONITORING    Indications    Persistent Atrial Fibrillation (Resolved) [I48.91]  Paroxysmal atrial fibrillation (H) [I48.0]  Persistent atrial fibrillation (H) [I48.19]             Comments:  Home monitor ( [employer] )managed by exception             Anticoagulation Care Providers       Provider Role Specialty Phone number    Erica Hansen APRN CNP Referring Cardiovascular Disease [phone]    Domo Garrett MD Referring Cardiovascular Disease [phone]    Everett Renee MD  Cardiovascular Disease [phone]

## 2023-11-07 NOTE — PROGRESS NOTES
ANTICOAGULATION  MANAGEMENT-Home Monitor Managed by Exception    Buck Sinclair 78 year old male is on warfarin with therapeutic INR result. (Goal INR 2.0-3.0)    Recent labs: (last 7 days)     11/07/23  0000   INR 2.1       Previous INR was Therapeutic  Medication, diet, health changes since last INR:chart reviewed; none identified  Contacted within the last 12 weeks by phone on 10/24/23      LUCAS     Buck was NOT contacted regarding therapeutic result today per home monitoring policy manage by exception agreement.   Current warfarin dose is to be continued:     Summary  As of 11/7/2023      Full warfarin instructions:  2.5 mg every day   Next INR check:  11/14/2023             ?   Leana Nayak RN  Anticoagulation Clinic  11/7/2023    _______________________________________________________________________     Anticoagulation Episode Summary       Current INR goal:  2.0-3.0   TTR:  76.8% (11.6 mo)   Target end date:  Indefinite   Send INR reminders to:  ANTICOAG HOME MONITORING    Indications    Persistent Atrial Fibrillation (Resolved) [I48.91]  Paroxysmal atrial fibrillation (H) [I48.0]  Persistent atrial fibrillation (H) [I48.19]             Comments:  Home monitor ( [employer] )managed by exception             Anticoagulation Care Providers       Provider Role Specialty Phone number    Erica Hansen APRN CNP Referring Cardiovascular Disease [phone]    Domo Garrett MD Referring Cardiovascular Disease [phone]    Everett Renee MD  Cardiovascular Disease [phone]

## 2023-11-14 NOTE — LETTER
11/14/2023    Vivek Guerrero MD  [address] [address]    RE: Buck Sinclair       Dear Colleague,     I had the pleasure of seeing Buck Sinclair in the [facility].        Assessment/Recommendations   Assessment:    1. Nonischemic cardiomyopathy, heart failure with improved ejection fraction, ejection fraction 55-60%, NYHA class III: Compensated.  His dyspnea exertion and fatigue.  He has trace edema.  His weight has decreased.  He follows a low-sodium diet.  2.  Paroxysmal atrial fibrillation: Device check is under sensing his atrial fibrillation.  Post PVI in April 2011 with redo in July 2011. He has been symptomatic with his atrial fibrillation in the past.  Recommend following up with EP for further management.  He continues sotalol.  He is on warfarin for anticoagulation  3.  CAD:  Denies angina.  Status post 3 drug-eluting stents to LAD in 2017.  Cardiac angiogram in 2022 showed patent stents and no significant CAD.  He continues aspirin and atorvastatin   4.  Hypertension:  5.  CKD stage IIIa: BMP pending    Plan:  1.  BMP and magnesium pending  2.  Continue current medications  3.  Continue monitoring weights and following a low-salt diet    Buck Sinclair will follow up with Dr. Garrett in March, EP for further atrial fibrillation management and in the heart failure clinic in 4 months.     History of Present Illness/Subjective    Mr. Buck Sinclair is a 78 year old male seen at [facility] heart failure clinic today for continued follow-up.  His wife accompanies him today.  He follows up for nonischemic cardiomyopathy, heart failure with improved ejection fraction.  His most recent echocardiogram was done May 2023 which showed an improved ejection fraction of 55 to 60%. He has a past medical history significant for hypertension, paroxysmal atrial fibrillation, status post PVI April 2011 with redo in July 2011, coronary artery disease with 3 stents to  "LAD, COPD on oxygen pulmonary hypertension, chronic kidney disease stage IIIa.    Today, he has fatigue and dyspnea on exertion.  He has trace edema.  He denies lightheadedness, shortness of breath, orthopnea, PND, palpitations, chest pain, and abdominal fullness/bloating.      He is monitoring home weights which have decreased.  He is following a low sodium diet.       Physical Examination Review of Systems   /72 (BP Location: Right arm, Patient Position: Sitting, Cuff Size: Adult Large)   Pulse 80   Resp 16   Ht 1.905 m (6' 3\")   Wt 111.2 kg (245 lb 3.2 oz)   BMI 30.65 kg/m    Body mass index is 30.65 kg/m .  Wt Readings from Last 3 Encounters:   11/14/23 111.2 kg (245 lb 3.2 oz)   09/28/23 112.5 kg (248 lb)   08/17/23 115.2 kg (254 lb)       General Appearance:   no acute distress   ENT/Mouth: No abnormalities   EYES:  no scleral icterus, normal conjunctivae   Neck: no thyromegaly   Chest/Lungs:   lungs are clear to auscultation, wearing oxygen, no rales or wheezing, equal chest wall expansion    Cardiovascular:   Irregularly irregular. Normal first and second heart sounds with no murmurs, rubs, or gallops, trace edema bilaterally    Abdomen:  bowel sounds are present   Extremities: no cyanosis or clubbing   Skin: warm   Neurologic: no tremors     Psychiatric: alert and oriented x3                                              Medical History  Surgical History Family History Social History   Past Medical History:   Diagnosis Date    Anemia     Asthma without status asthmaticus 05/05/2021    Atrial fibrillation (H)     BPH (benign prostatic hyperplasia)     Cardiomyopathy (H)     CKD (chronic kidney disease) stage 3, GFR 30-59 ml/min (H) 05/24/2023    Congestive heart failure (H)     COPD, group B, by GOLD 2017 classification (H)     Coronary artery disease due to calcified coronary lesion     Dyslipidemia, goal LDL below 70     Essential hypertension     Heart failure with reduced ejection fraction (H) " 08/17/2023    Hemoptysis 10/04/2017    History of transfusion     Hyperlipidemia     Persistent atrial fibrillation (H)     Pneumonia of left lower lobe due to infectious organism 10/04/2017    Pulmonary hypertension (H)     Skin cancer of trunk     Status post catheter ablation of atrial fibrillation 06/07/2017    PVI 4-2011 (Cryo/PVI + roof line + CTI line) Re-do PVI 7-2011 (RFA/PVI + CFE + VIDYA + confirmed CTI line)    Ventricular tachycardia (H)     Past Surgical History:   Procedure Laterality Date    CARDIAC DEFIBRILLATOR PLACEMENT      CARDIOVERSION  07/11/2018    x20, last 2/12/15, 10/2015, 11/18/16, 6/16/17 by Lauren Foster CNP    CARDIOVERSION  07/11/2018    CARDIOVERSION  11/19/2021    COLONOSCOPY N/A 04/28/2017    Procedure: COLONOSCOPY with 2 ascending polyps and 1 transverse polyp;  Surgeon: Jose Whittington MD;  Location: [facility];  Service:     CORONARY ANGIOGRAPHY ADULT ORDER      CV CORONARY ANGIOGRAM N/A 09/20/2017    Procedure: Coronary Angiogram;  Surgeon: Sergio Cervantes MD;  Location: [facility];  Service:     CV CORONARY ANGIOGRAM N/A 01/24/2022    Procedure: Coronary Angiogram;  Surgeon: Christi Saunders MD;  Location: [facility] LAB CV    CV LEFT HEART CATH N/A 01/24/2022    Procedure: Left Heart Cath;  Surgeon: Christi Saunders MD;  Location: [facility] LAB CV    CV RIGHT AND LEFT HEART CATH N/A 01/24/2022    Procedure: Right and Left Heart Catherization;  Surgeon: Christi Saunders MD;  Location: [facility] LAB CV    EP ICD GENERATOR REPLACEMENT DUAL N/A 10/28/2022    Procedure: Implantable Cardioverter Defibrillator Generator Replacement Dual;  Surgeon: Elo Collins MD;  Location: [facility] LAB CV    EP ICD INSERT      FRACTURE SURGERY Left     wrist    HEART CATH, ANGIOPLASTY      IMPLANT AUTOMATIC IMPLANTABLE CARDIOVERTER DEFIBRILLATOR      INGUINAL HERNIA REPAIR Left 1967    while in the [employer] in [employer] after 13 month in [place]    INSERT / REPLACE /  REMOVE PACEMAKER      IR MISCELLANEOUS PROCEDURE  2014    OTHER SURGICAL HISTORY      left hand surgery---tendon repair    VT ABLATE HEART DYSRHYTHM FOCUS  2011    Catheter Ablation Atrial Fibrillation PVI 2011 (Cryo+RF-PVI + roof line + CTI line)    VT ABLATE HEART DYSRHYTHM FOCUS  2011    Re-do PVI 2011 (RFA-PVI + CFE + VIDYA + confirmation of CTI line)    TOTAL SHOULDER REPLACEMENT Right 2016    Dr. Abernathy of [facility]    WRIST SURGERY Left     ZZC MYERS W/O FACETEC FORAMOT/DSKC  VRT SEG, CERVICAL      Laminectomy Lumbar;  Recorded: 2012;    Family History   Problem Relation Age of Onset    Cancer Mother         leukemia    Cancer Father         bladder    Cancer Sister         breast with lung met.    Aneurysm Sister     CABG Brother     CABG Brother     Valvular heart disease Brother         valve replacement    Social History     Socioeconomic History    Marital status:      Spouse name: Not on file    Number of children: Not on file    Years of education: Not on file    Highest education level: Not on file   Occupational History    Not on file   Tobacco Use    Smoking status: Former     Packs/day: 1.00     Years: 4.00     Additional pack years: 0.00     Total pack years: 4.00     Types: Cigarettes     Quit date: 1968     Years since quittin.9    Smokeless tobacco: Never   Vaping Use    Vaping Use: Never used   Substance and Sexual Activity    Alcohol use: Yes     Alcohol/week: 2.0 standard drinks of alcohol     Comment: Alcoholic Drinks/day: 1 beer per week    Drug use: No    Sexual activity: Yes     Partners: Female     Birth control/protection: Post-menopausal   Other Topics Concern    Parent/sibling w/ CABG, MI or angioplasty before 65F 55M? Not Asked   Social History Narrative    Preloaded 2013     Social Determinants of Health     Financial Resource Strain: Not on file   Food Insecurity: Not on file   Transportation Needs: Not on file  "  Physical Activity: Not on file   Stress: Not on file   Social Connections: Not on file   Interpersonal Safety: Not on file   Housing Stability: Not on file          Medications  Allergies   Current Outpatient Medications   Medication Sig Dispense Refill    acetaminophen (TYLENOL) 325 MG tablet Take 650 mg by mouth 2 times daily as needed      albuterol (PROAIR HFA/PROVENTIL HFA/VENTOLIN HFA) 108 (90 Base) MCG/ACT inhaler Inhale 2 puffs into the lungs every 4 hours as needed for shortness of breath or wheezing 18 g 4    Ascorbic Acid (VITAMIN C) 500 MG CAPS Take 1,000 mg by mouth daily      aspirin (ASA) 81 MG EC tablet Take 1 tablet (81 mg) by mouth daily 90 tablet 0    atorvastatin (LIPITOR) 40 MG tablet Take 40 mg by mouth every evening      budesonide-formoterol (SYMBICORT) 160-4.5 MCG/ACT Inhaler Inhale 2 puffs into the lungs 2 times daily 10.2 g 11    bumetanide (BUMEX) 2 MG tablet Take 1 tablet (2 mg) by mouth 2 times daily 180 tablet 3    co-enzyme Q-10 100 MG CAPS capsule Take 2 capsules (200 mg) by mouth daily 2 capsule     empagliflozin (JARDIANCE) 10 MG TABS tablet Take 1 tablet (10 mg) by mouth daily 90 tablet 3    fish oil-omega-3 fatty acids 1000 MG capsule Take 1 g by mouth 2 times daily      FLUoxetine (PROZAC) 20 MG capsule TAKE 1 CAPSULE BY MOUTH EVERY DAY      MAG64 64 MG TBEC CR tablet TAKE 2 TABLETS BY MOUTH EVERY  tablet 1    multivitamin, therapeutic (THERA-VIT) TABS tablet Take 1 tablet by mouth daily      nitroGLYcerin (NITROSTAT) 0.4 MG sublingual tablet One tablet under the tongue every 5 minutes if needed for chest pain. May repeat every 5 minutes for a maximum of 3 doses in 15 minutes\" 25 tablet 3    OXYGEN-HELIUM IN 4-5 L       polyethylene glycol (MIRALAX) 17 GM/Dose powder Take 17 g by mouth daily       sotalol (BETAPACE) 120 MG tablet Take 1 tablet (120 mg) by mouth every 24 hours 90 tablet 3    tiotropium (SPIRIVA) 18 MCG inhaled capsule Inhale 1 capsule (18 mcg) into the " lungs daily 30 capsule 11    vitamin D2 (ERGOCALCIFEROL) 38193 units (1250 mcg) capsule Take 50,000 Units by mouth twice a week On Sunday and Wednesday      warfarin ANTICOAGULANT (COUMADIN) 2.5 MG tablet Take 1 tablet (2.5 mg) by mouth daily Take 1/2 tablet Sun, Tue, Fri; 1 tablet all other days. Adjust as directed by INR clinic. 90 tablet 1    Allergies   Allergen Reactions    Adhesive Tape Other (See Comments)     ADHESIVE TAPE; SKIN IRRITATION; Skin pulled off with foam tape      Amiodarone      ADVERSE REACTION.  Sunlight sensitivity.    Lisinopril          Lab Results    Chemistry/lipid CBC Cardiac Enzymes/BNP/TSH/INR   Lab Results   Component Value Date    CHOL 109 10/06/2022    HDL 58 10/06/2022    TRIG 39 10/06/2022    BUN 29.4 (H) 08/17/2023     08/17/2023    CO2 28 08/17/2023    Lab Results   Component Value Date    WBC 12.6 (H) 06/01/2023    HGB 11.7 (L) 06/01/2023    HCT 36.2 (L) 06/01/2023     06/01/2023     (L) 06/01/2023    Lab Results   Component Value Date     (H) 12/01/2020    TSH 2.38 05/18/2023    INR 2.0 11/14/2023             This note has been dictated using voice recognition software. Any grammatical, typographical, or context distortions are unintentional and inherent to the software    30 minutes spent on the date of encounter doing chart review, review of outside records, review of test results, interpretation with above tests, patient visit, documentation, and discussion with family.                Thank you for allowing me to participate in the care of your patient.      Sincerely,     Jessica Barrera, [facility]  cc:   Jillian Prince MD  [address] [facility] [address]  [place] [address]

## 2023-11-14 NOTE — PROGRESS NOTES
Assessment/Recommendations   Assessment:    1. Nonischemic cardiomyopathy, heart failure with improved ejection fraction, ejection fraction 55-60%, NYHA class III: Compensated.  His dyspnea exertion and fatigue.  He has trace edema.  His weight has decreased.  He follows a low-sodium diet.  2.  Paroxysmal atrial fibrillation: Device check is under sensing his atrial fibrillation.  Post PVI in April 2011 with redo in July 2011. He has been symptomatic with his atrial fibrillation in the past.  Recommend following up with EP for further management.  He continues sotalol.  He is on warfarin for anticoagulation  3.  CAD:  Denies angina.  Status post 3 drug-eluting stents to LAD in 2017.  Cardiac angiogram in 2022 showed patent stents and no significant CAD.  He continues aspirin and atorvastatin   4.  Hypertension:  5.  CKD stage IIIa: BMP pending    Plan:  1.  BMP and magnesium pending  2.  Continue current medications  3.  Continue monitoring weights and following a low-salt diet    Bcuk Sinclair will follow up with Dr. Garrett in March, [place] for further atrial fibrillation management and in the heart failure clinic in 4 months.     History of Present Illness/Subjective    Mr. Buck Sinclair is a 78 year old male seen at [facility] heart failure clinic today for continued follow-up.  His wife accompanies him today.  He follows up for nonischemic cardiomyopathy, heart failure with improved ejection fraction.  His most recent echocardiogram was done May 2023 which showed an improved ejection fraction of 55 to 60%. He has a past medical history significant for hypertension, paroxysmal atrial fibrillation, status post PVI April 2011 with redo in July 2011, coronary artery disease with 3 stents to LAD, COPD on oxygen pulmonary hypertension, chronic kidney disease stage IIIa.    Today, he has fatigue and dyspnea on exertion.  He has trace edema.  He denies lightheadedness, shortness of breath, orthopnea,  "PND, palpitations, chest pain, and abdominal fullness/bloating.      He is monitoring home weights which have decreased.  He is following a low sodium diet.       Physical Examination Review of Systems   /72 (BP Location: Right arm, Patient Position: Sitting, Cuff Size: Adult Large)   Pulse 80   Resp 16   Ht 1.905 m (6' 3\")   Wt 111.2 kg (245 lb 3.2 oz)   BMI 30.65 kg/m    Body mass index is 30.65 kg/m .  Wt Readings from Last 3 Encounters:   11/14/23 111.2 kg (245 lb 3.2 oz)   09/28/23 112.5 kg (248 lb)   08/17/23 115.2 kg (254 lb)       General Appearance:   no acute distress   ENT/Mouth: No abnormalities   EYES:  no scleral icterus, normal conjunctivae   Neck: no thyromegaly   Chest/Lungs:   lungs are clear to auscultation, wearing oxygen, no rales or wheezing, equal chest wall expansion    Cardiovascular:   Irregularly irregular. Normal first and second heart sounds with no murmurs, rubs, or gallops, trace edema bilaterally    Abdomen:  bowel sounds are present   Extremities: no cyanosis or clubbing   Skin: warm   Neurologic: no tremors     Psychiatric: alert and oriented x3                                              Medical History  Surgical History Family History Social History   Past Medical History:   Diagnosis Date     Anemia      Asthma without status asthmaticus 05/05/2021     Atrial fibrillation (H)      BPH (benign prostatic hyperplasia)      Cardiomyopathy (H)      CKD (chronic kidney disease) stage 3, GFR 30-59 ml/min (H) 05/24/2023     Congestive heart failure (H)      COPD, group B, by GOLD 2017 classification (H)      Coronary artery disease due to calcified coronary lesion      Dyslipidemia, goal LDL below 70      Essential hypertension      Heart failure with reduced ejection fraction (H) 08/17/2023     Hemoptysis 10/04/2017     History of transfusion      Hyperlipidemia      Persistent atrial fibrillation (H)      Pneumonia of left lower lobe due to infectious organism 10/04/2017 "     Pulmonary hypertension (H)      Skin cancer of trunk      Status post catheter ablation of atrial fibrillation 06/07/2017    PVI 4-2011 (Cryo/PVI + roof line + CTI line) Re-do PVI 7-2011 (RFA/PVI + CFE + VIDYA + confirmed CTI line)     Ventricular tachycardia (H)     Past Surgical History:   Procedure Laterality Date     CARDIAC DEFIBRILLATOR PLACEMENT       CARDIOVERSION  07/11/2018    x20, last 2/12/15, 10/2015, 11/18/16, 6/16/17 by Lauren Foster CNP     CARDIOVERSION  07/11/2018     CARDIOVERSION  11/19/2021     COLONOSCOPY N/A 04/28/2017    Procedure: COLONOSCOPY with 2 ascending polyps and 1 transverse polyp;  Surgeon: Jose Whittington MD;  Location: [facility];  Service:      CORONARY ANGIOGRAPHY ADULT ORDER       CV CORONARY ANGIOGRAM N/A 09/20/2017    Procedure: Coronary Angiogram;  Surgeon: Sergio Cervantes MD;  Location: [facility] Cath Lab;  Service:      CV CORONARY ANGIOGRAM N/A 01/24/2022    Procedure: Coronary Angiogram;  Surgeon: Christi Saunders MD;  Location: [facility] CATH LAB CV     CV LEFT HEART CATH N/A 01/24/2022    Procedure: Left Heart Cath;  Surgeon: Christi Saunders MD;  Location: [facility] LAB CV     CV RIGHT AND LEFT HEART CATH N/A 01/24/2022    Procedure: Right and Left Heart Catherization;  Surgeon: Christi Saunders MD;  Location: [facility] CV     EP ICD GENERATOR REPLACEMENT DUAL N/A 10/28/2022    Procedure: Implantable Cardioverter Defibrillator Generator Replacement Dual;  Surgeon: Elo Colilns MD;  Location: [facility] CATH LAB CV     EP ICD INSERT       FRACTURE SURGERY Left     wrist     HEART CATH, ANGIOPLASTY       IMPLANT AUTOMATIC IMPLANTABLE CARDIOVERTER DEFIBRILLATOR       INGUINAL HERNIA REPAIR Left 1967    while in the [employer] in [place] after 13 month in [place]     INSERT / REPLACE / REMOVE PACEMAKER       IR MISCELLANEOUS PROCEDURE  04/30/2014     OTHER SURGICAL HISTORY      left hand surgery---tendon repair     PA ABLATE HEART DYSRHYTHM FOCUS   2011    Catheter Ablation Atrial Fibrillation PVI 2011 (Cryo+RF-PVI + roof line + CTI line)     TX ABLATE HEART DYSRHYTHM FOCUS  2011    Re-do PVI 2011 (RFA-PVI + CFE + VIDYA + confirmation of CTI line)     TOTAL SHOULDER REPLACEMENT Right 2016    Dr. Abernathy of [facility]     WRIST SURGERY Left      ZZC MYERS W/O FACETEC FORAMOT/DSKC  VRT SEG, CERVICAL      Laminectomy Lumbar;  Recorded: 2012;    Family History   Problem Relation Age of Onset     Cancer Mother         leukemia     Cancer Father         bladder     Cancer Sister         breast with lung met.     Aneurysm Sister      CABG Brother      CABG Brother      Valvular heart disease Brother         valve replacement    Social History     Socioeconomic History     Marital status:      Spouse name: Not on file     Number of children: Not on file     Years of education: Not on file     Highest education level: Not on file   Occupational History     Not on file   Tobacco Use     Smoking status: Former     Packs/day: 1.00     Years: 4.00     Additional pack years: 0.00     Total pack years: 4.00     Types: Cigarettes     Quit date: 1968     Years since quittin.9     Smokeless tobacco: Never   Vaping Use     Vaping Use: Never used   Substance and Sexual Activity     Alcohol use: Yes     Alcohol/week: 2.0 standard drinks of alcohol     Comment: Alcoholic Drinks/day: 1 beer per week     Drug use: No     Sexual activity: Yes     Partners: Female     Birth control/protection: Post-menopausal   Other Topics Concern     Parent/sibling w/ CABG, MI or angioplasty before 65F 55M? Not Asked   Social History Narrative    Preloaded 2013     Social Determinants of Health     Financial Resource Strain: Not on file   Food Insecurity: Not on file   Transportation Needs: Not on file   Physical Activity: Not on file   Stress: Not on file   Social Connections: Not on file   Interpersonal Safety: Not on file   Housing  "Stability: Not on file          Medications  Allergies   Current Outpatient Medications   Medication Sig Dispense Refill     acetaminophen (TYLENOL) 325 MG tablet Take 650 mg by mouth 2 times daily as needed       albuterol (PROAIR HFA/PROVENTIL HFA/VENTOLIN HFA) 108 (90 Base) MCG/ACT inhaler Inhale 2 puffs into the lungs every 4 hours as needed for shortness of breath or wheezing 18 g 4     Ascorbic Acid (VITAMIN C) 500 MG CAPS Take 1,000 mg by mouth daily       aspirin (ASA) 81 MG EC tablet Take 1 tablet (81 mg) by mouth daily 90 tablet 0     atorvastatin (LIPITOR) 40 MG tablet Take 40 mg by mouth every evening       budesonide-formoterol (SYMBICORT) 160-4.5 MCG/ACT Inhaler Inhale 2 puffs into the lungs 2 times daily 10.2 g 11     bumetanide (BUMEX) 2 MG tablet Take 1 tablet (2 mg) by mouth 2 times daily 180 tablet 3     co-enzyme Q-10 100 MG CAPS capsule Take 2 capsules (200 mg) by mouth daily 2 capsule      empagliflozin (JARDIANCE) 10 MG TABS tablet Take 1 tablet (10 mg) by mouth daily 90 tablet 3     fish oil-omega-3 fatty acids 1000 MG capsule Take 1 g by mouth 2 times daily       FLUoxetine (PROZAC) 20 MG capsule TAKE 1 CAPSULE BY MOUTH EVERY DAY       MAG64 64 MG TBEC CR tablet TAKE 2 TABLETS BY MOUTH EVERY  tablet 1     multivitamin, therapeutic (THERA-VIT) TABS tablet Take 1 tablet by mouth daily       nitroGLYcerin (NITROSTAT) 0.4 MG sublingual tablet One tablet under the tongue every 5 minutes if needed for chest pain. May repeat every 5 minutes for a maximum of 3 doses in 15 minutes\" 25 tablet 3     OXYGEN-HELIUM IN 4-5 L        polyethylene glycol (MIRALAX) 17 GM/Dose powder Take 17 g by mouth daily        sotalol (BETAPACE) 120 MG tablet Take 1 tablet (120 mg) by mouth every 24 hours 90 tablet 3     tiotropium (SPIRIVA) 18 MCG inhaled capsule Inhale 1 capsule (18 mcg) into the lungs daily 30 capsule 11     vitamin D2 (ERGOCALCIFEROL) 46453 units (1250 mcg) capsule Take 50,000 Units by mouth " twice a week On Sunday and Wednesday       warfarin ANTICOAGULANT (COUMADIN) 2.5 MG tablet Take 1 tablet (2.5 mg) by mouth daily Take 1/2 tablet Sun, Tue, Fri; 1 tablet all other days. Adjust as directed by INR clinic. 90 tablet 1    Allergies   Allergen Reactions     Adhesive Tape Other (See Comments)     ADHESIVE TAPE; SKIN IRRITATION; Skin pulled off with foam tape       Amiodarone      ADVERSE REACTION.  Sunlight sensitivity.     Lisinopril          Lab Results    Chemistry/lipid CBC Cardiac Enzymes/BNP/TSH/INR   Lab Results   Component Value Date    CHOL 109 10/06/2022    HDL 58 10/06/2022    TRIG 39 10/06/2022    BUN 29.4 (H) 08/17/2023     08/17/2023    CO2 28 08/17/2023    Lab Results   Component Value Date    WBC 12.6 (H) 06/01/2023    HGB 11.7 (L) 06/01/2023    HCT 36.2 (L) 06/01/2023     06/01/2023     (L) 06/01/2023    Lab Results   Component Value Date     (H) 12/01/2020    TSH 2.38 05/18/2023    INR 2.0 11/14/2023             This note has been dictated using voice recognition software. Any grammatical, typographical, or context distortions are unintentional and inherent to the software    30 minutes spent on the date of encounter doing chart review, review of outside records, review of test results, interpretation with above tests, patient visit, documentation, and discussion with family.

## 2023-11-14 NOTE — PATIENT INSTRUCTIONS
Buck Sinclair,    It was a pleasure to see you today at [facility].     My recommendations after this visit include:  - Please follow up with Dr Garrett in 2 months or next available  - Please follow up with EP team to discuss afib management  - Please follow up with Jessica Barrera in 4 months  - I have checked labs  - Continue current medications    Jessica Barrera, CNP

## 2023-11-14 NOTE — TELEPHONE ENCOUNTER
Encounter Type: Patient seen in clinic for annual device evaluation and iterative programming, followed by Jessica Barrera NP (patient accompanied by wife)   Device: Medtronic Cobalt (D) ICD  Pacing %/Programmed: AP 77%,  55.4%, AAIR-DDDR 60-130bpm  Lead(s): Stable  Battery longevity: Estimating 9.1 years remaining   Presenting rhythm: AS/AF- 80bpm (undersensing p-waves, programmed most sensitive, appears in AF per surface EGM)  Underlying rhythm: AF w/v-rate 50-80's  Heart rates: Stable, HR's per histogram range 60-150bpm and primarily 60's and 80's  Atrial high rates: 7,593 mode switches logged, EGM's suggests AF with undersensing AF, burden 6.8%, v-rate >/=120bpm <5%, patient unable to recall specific symptoms due to chronic lung issues.  Anticoagulant: Warfarin  Ventricular high rates: 21 VHR episodes logged, EGM's from May 2023 suggest 6-13 beats NSVT, patient unable to recall symptoms but remembers he was in the hospital around that time. EF 55-60% per echo 5/21/23.  Comments: Normal device function. Updated Wavelet Template with match scores %. Adjusted device clock time to current. Post Mode Switch turned OFF to frequent mode switching, undersensing AF, increased  and increase noted in HR's in the 80's. Routed to Dr. Collins to review undersensing AF and programming.  Plan: Remote device check scheduled for 2/19/24. Sherin Velásquez RN

## 2023-11-14 NOTE — PROGRESS NOTES
ANTICOAGULATION  MANAGEMENT-Home Monitor Managed by Exception    Buck Sinclair 78 year old male is on warfarin with therapeutic INR result. (Goal INR 2.0-3.0)    Recent labs: (last 7 days)     11/14/23  0000   INR 2.0       Previous INR was Therapeutic  Medication, diet, health changes since last INR:chart reviewed; none identified  Contacted within the last 12 weeks by phone on 10/24/23      LUCAS     Buck was NOT contacted regarding therapeutic result today per home monitoring policy manage by exception agreement.   Current warfarin dose is to be continued:     Summary  As of 11/14/2023      Full warfarin instructions:  2.5 mg every day   Next INR check:  11/21/2023                 Leana Nayak RN  Anticoagulation Clinic  11/14/2023    _______________________________________________________________________     Anticoagulation Episode Summary       Current INR goal:  2.0-3.0   TTR:  76.8% (11.6 mo)   Target end date:  Indefinite   Send INR reminders to:  [facility]    Indications    Persistent Atrial Fibrillation (Resolved) [I48.91]  Paroxysmal atrial fibrillation (H) [I48.0]  Persistent atrial fibrillation (H) [I48.19]             Comments:  Home monitor ( [employer] )managed by exception             Anticoagulation Care Providers       Provider Role Specialty Phone number    Erica Hansen APRN CNP Referring Cardiovascular Disease [phone]    Domo Garrett MD Referring Cardiovascular Disease [phone]    Everett Renee MD  Cardiovascular Disease [phone]

## 2023-11-21 NOTE — PROGRESS NOTES
ANTICOAGULATION  MANAGEMENT-Home Monitor Managed by Exception    Buck Sinclair 78 year old male is on warfarin with therapeutic INR result. (Goal INR 2.0-3.0)    Recent labs: (last 7 days)     11/21/23  0000   INR 2.4       Previous INR was Therapeutic  Medication, diet, health changes since last INR:chart reviewed; none identified  Contacted within the last 12 weeks by phone on 10/24/23      LUCAS     Buck was NOT contacted regarding therapeutic result today per home monitoring policy manage by exception agreement.   Current warfarin dose is to be continued:     Summary  As of 11/21/2023      Full warfarin instructions:  2.5 mg every day   Next INR check:  11/28/2023             ?   Leana Nayak RN  Anticoagulation Clinic  11/21/2023    _______________________________________________________________________     Anticoagulation Episode Summary       Current INR goal:  2.0-3.0   TTR:  76.8% (11.6 mo)   Target end date:  Indefinite   Send INR reminders to:  [facility]    Indications    Persistent Atrial Fibrillation (Resolved) [I48.91]  Paroxysmal atrial fibrillation (H) [I48.0]  Persistent atrial fibrillation (H) [I48.19]             Comments:  Home monitor ( [employer] )managed by exception             Anticoagulation Care Providers       Provider Role Specialty Phone number    Erica Hansen APRN CNP Referring Cardiovascular Disease [phone]    Domo Garrett MD Referring Cardiovascular Disease [phone]    Everett Renee MD  Cardiovascular Disease [phone]

## 2023-11-28 NOTE — PROGRESS NOTES
ANTICOAGULATION  MANAGEMENT-Home Monitor Managed by Exception    Buck Sinclair 78 year old male is on warfarin with therapeutic INR result. (Goal INR 2.0-3.0)    Recent labs: (last 7 days)     11/28/23  0000   INR 2.9       Previous INR was Therapeutic  Medication, diet, health changes since last INR:chart reviewed; none identified  Contacted within the last 12 weeks by phone on 10/24/23      LUCAS     Buck was NOT contacted regarding therapeutic result today per home monitoring policy manage by exception agreement.   Current warfarin dose is to be continued:     Summary  As of 11/28/2023      Full warfarin instructions:  2.5 mg every day   Next INR check:  12/5/2023             ?   Leana Nayak RN  Anticoagulation Clinic  11/28/2023    _______________________________________________________________________     Anticoagulation Episode Summary       Current INR goal:  2.0-3.0   TTR:  76.8% (11.6 mo)   Target end date:  Indefinite   Send INR reminders to:  [facility]    Indications    Persistent Atrial Fibrillation (Resolved) [I48.91]  Paroxysmal atrial fibrillation (H) [I48.0]  Persistent atrial fibrillation (H) [I48.19]             Comments:  Home monitor ( [employer] )managed by exception             Anticoagulation Care Providers       Provider Role Specialty Phone number    Erica Hansen APRN CNP Referring Cardiovascular Disease [phone]    Domo Garrett MD Referring Cardiovascular Disease [phone]    Everett Renee MD  Cardiovascular Disease [phone]

## 2023-12-05 NOTE — PROGRESS NOTES
ANTICOAGULATION  MANAGEMENT-Home Monitor Managed by Exception    Buck Sinclair 78 year old male is on warfarin with therapeutic INR result. (Goal INR 2.0-3.0)    Recent labs: (last 7 days)     12/05/23  0000   INR 2.7       Previous INR was Therapeutic  Medication, diet, health changes since last INR:chart reviewed; none identified  Contacted within the last 12 weeks by phone on 10/24/23  Last [facility] referral date: 09/12/2023      LUCAS Jane was NOT contacted regarding therapeutic result today per home monitoring policy manage by exception agreement.   Current warfarin dose is to be continued:     Summary  As of 12/5/2023      Full warfarin instructions:  2.5 mg every day   Next INR check:  12/12/2023             ?   Leana Nayak RN  Anticoagulation Clinic  12/5/2023    _______________________________________________________________________     Anticoagulation Episode Summary       Current INR goal:  2.0-3.0   TTR:  76.8% (11.6 mo)   Target end date:  Indefinite   Send INR reminders to:  [facility]    Indications    Persistent Atrial Fibrillation (Resolved) [I48.91]  Paroxysmal atrial fibrillation (H) [I48.0]  Persistent atrial fibrillation (H) [I48.19]             Comments:  Home monitor ( [employer] )managed by exception             Anticoagulation Care Providers       Provider Role Specialty Phone number    Erica Hansen APRN CNP Referring Cardiovascular Disease [phone]    Domo Garrett MD Referring Cardiovascular Disease [phone]    Everett Renee MD  Cardiovascular Disease [phone]

## 2023-12-06 NOTE — PROGRESS NOTES
HEART CARE ENCOUNTER CONSULTATON NOTE      [facility]  [phone]      Assessment/Recommendations   Assessment/Plan:    Buck Sinclair is a very pleasant 78 year old male CAD s/p PCI, paroxysmal atrial fibrillation s/p PVI x2 in 2011, hypertension, COPD on oxygen, pulmonary hypertension, CKD who presents today to the EP clinic.    Permanent atrial fibrillation  - s/p PVIx2 in 2011  - will stop Sotalol and switch to Metoprolol succinate 25 mg daily(also has CKD with GFR of 43)    2. Anticoagulation  - on warfarin  - continue same for now    3. HTN  - well controlled  - continue same meds    4. CAD s/p PCI  - stable  - continue same meds    5. ICD in situ  - RA lead is undersensing and not capturing  - given permanent AF will switch to VVIR mode  - continue remote checks    Time spent: 45 minutes spent on the date of the encounter doing chart review, history and exam, documentation and further activities as noted above.           History of Present Illness/Subjective    HPI: Buck Sinclair is a very pleasant 78 year old male CAD s/p PCI, paroxysmal atrial fibrillation s/p PVI x2 in 2011, hypertension, COPD on oxygen, pulmonary hypertension, CKD who presents today to the EP clinic.    He keeps busy on a daily basis by working in his garage. He has been on continuous Oxygen for 3 years now. No significant change in his health status over the last few months.    His RA lead has been undersensing and not capturing. He is currently in permanent AF.     Recent Echocardiogram Results (personally reviewed):  2023    Interpretation Summary     Left ventricular size, wall motion and function are normal. The ejection  fraction is 55-60%.  The right ventricle is moderately dilated.  Moderately decreased right ventricular systolic function  The left atrium is moderately dilated.  The right atrium is severely dilated.  There is moderate to mod-severe (2-3+) tricuspid regurgitation.  Severe (>55mmHg)  pulmonary hypertension is present.  IVC diameter >2.1 cm collapsing <50% with sniff suggests a high RA pressure  estimated at 15 mmHg or greater.  _______________________________      Labs below reviewed personally     Physical Examination  Review of Systems   Vitals: /70 (BP Location: Left arm, Patient Position: Sitting, Cuff Size: Adult Large)   Pulse 70   Resp 20   SpO2 97%   BMI= There is no height or weight on file to calculate BMI.  Wt Readings from Last 3 Encounters:   11/14/23 111.2 kg (245 lb 3.2 oz)   09/28/23 112.5 kg (248 lb)   08/17/23 115.2 kg (254 lb)       General Appearance:   no distress, normal body habitus   ENT/Mouth: membranes moist, no oral lesions or bleeding gums.      EYES:  no scleral icterus, normal conjunctivae   Neck: no carotid bruits or thyromegaly   Chest/Lungs:   +wheezing, no sternal scar, equal chest wall expansion    Cardiovascular:   Irregular. Normal first and second heart sounds with no murmurs, rubs, or gallops; the carotid, radial and posterior tibial pulses are intact, no edema bilaterally    Abdomen:  no organomegaly, masses, bruits, or tenderness; bowel sounds are present   Extremities: no cyanosis or clubbing   Skin: no xanthelasma, warm.    Neurologic: normal  bilateral, no tremors     Psychiatric: alert and oriented x3, calm        Please refer above for cardiac ROS details.        Medical History  Surgical History Family History Social History   Past Medical History:   Diagnosis Date     Anemia      Asthma without status asthmaticus 05/05/2021     Atrial fibrillation (H)      BPH (benign prostatic hyperplasia)      Cardiomyopathy (H)      CKD (chronic kidney disease) stage 3, GFR 30-59 ml/min (H) 05/24/2023     Congestive heart failure (H)      COPD, group B, by GOLD 2017 classification (H)      Coronary artery disease due to calcified coronary lesion      Dyslipidemia, goal LDL below 70      Essential hypertension      Heart failure with reduced ejection  fraction (H) 08/17/2023     Hemoptysis 10/04/2017     History of transfusion      Hyperlipidemia      Persistent atrial fibrillation (H)      Pneumonia of left lower lobe due to infectious organism 10/04/2017     Pulmonary hypertension (H)      Skin cancer of trunk      Status post catheter ablation of atrial fibrillation 06/07/2017    PVI 4-2011 (Cryo/PVI + roof line + CTI line) Re-do PVI 7-2011 (RFA/PVI + CFE + VIDYA + confirmed CTI line)     Ventricular tachycardia (H)      Past Surgical History:   Procedure Laterality Date     CARDIAC DEFIBRILLATOR PLACEMENT       CARDIOVERSION  07/11/2018    x20, last 2/12/15, 10/2015, 11/18/16, 6/16/17 by Lauren Foster CNP     CARDIOVERSION  07/11/2018     CARDIOVERSION  11/19/2021     COLONOSCOPY N/A 04/28/2017    Procedure: COLONOSCOPY with 2 ascending polyps and 1 transverse polyp;  Surgeon: Jose Whittington MD;  Location: [facility] GI;  Service:      CORONARY ANGIOGRAPHY ADULT ORDER       CV CORONARY ANGIOGRAM N/A 09/20/2017    Procedure: Coronary Angiogram;  Surgeon: Sergio Cervantes MD;  Location: [facility] Cath Lab;  Service:      CV CORONARY ANGIOGRAM N/A 01/24/2022    Procedure: Coronary Angiogram;  Surgeon: Christi Saunders MD;  Location: [facility] CATH LAB CV     CV LEFT HEART CATH N/A 01/24/2022    Procedure: Left Heart Cath;  Surgeon: Christi Saunders MD;  Location: [facility] LAB CV     CV RIGHT AND LEFT HEART CATH N/A 01/24/2022    Procedure: Right and Left Heart Catherization;  Surgeon: Christi Saunders MD;  Location: [facility] LAB CV     EP ICD GENERATOR REPLACEMENT DUAL N/A 10/28/2022    Procedure: Implantable Cardioverter Defibrillator Generator Replacement Dual;  Surgeon: Elo Collins MD;  Location: [facility] CATH LAB CV     EP ICD INSERT       FRACTURE SURGERY Left     wrist     HEART CATH, ANGIOPLASTY       IMPLANT AUTOMATIC IMPLANTABLE CARDIOVERTER DEFIBRILLATOR       INGUINAL HERNIA REPAIR Left 1967    while in the [employer] in [employer] after 13  month in [place]     INSERT / REPLACE / REMOVE PACEMAKER       IR MISCELLANEOUS PROCEDURE  2014     OTHER SURGICAL HISTORY      left hand surgery---tendon repair     GA ABLATE HEART DYSRHYTHM FOCUS  2011    Catheter Ablation Atrial Fibrillation PVI 2011 (Cryo+RF-PVI + roof line + CTI line)     GA ABLATE HEART DYSRHYTHM FOCUS  2011    Re-do PVI 2011 (RFA-PVI + CFE + VIDYA + confirmation of CTI line)     TOTAL SHOULDER REPLACEMENT Right 2016    Dr. Abernathy of [facility]     WRIST SURGERY Left      ZZC MYERS W/O FACETEC FORAMOT/DSKC  VRT SEG, CERVICAL      Laminectomy Lumbar;  Recorded: 2012;     Family History   Problem Relation Age of Onset     Cancer Mother         leukemia     Cancer Father         bladder     Cancer Sister         breast with lung met.     Aneurysm Sister      CABG Brother      CABG Brother      Valvular heart disease Brother         valve replacement        Social History     Socioeconomic History     Marital status:      Spouse name: Not on file     Number of children: Not on file     Years of education: Not on file     Highest education level: Not on file   Occupational History     Not on file   Tobacco Use     Smoking status: Former     Packs/day: 1.00     Years: 4.00     Additional pack years: 0.00     Total pack years: 4.00     Types: Cigarettes     Quit date: 1968     Years since quittin.9     Smokeless tobacco: Never   Vaping Use     Vaping Use: Never used   Substance and Sexual Activity     Alcohol use: Yes     Alcohol/week: 2.0 standard drinks of alcohol     Comment: Alcoholic Drinks/day: 1 beer per week     Drug use: No     Sexual activity: Yes     Partners: Female     Birth control/protection: Post-menopausal   Other Topics Concern     Parent/sibling w/ CABG, MI or angioplasty before 65F 55M? Not Asked   Social History Narrative    Preloaded 2013     Social Determinants of Health     Financial Resource Strain: Not on  "file   Food Insecurity: Not on file   Transportation Needs: Not on file   Physical Activity: Not on file   Stress: Not on file   Social Connections: Not on file   Interpersonal Safety: Not on file   Housing Stability: Not on file           Medications  Allergies   Current Outpatient Medications   Medication Sig Dispense Refill     acetaminophen (TYLENOL) 325 MG tablet Take 650 mg by mouth 2 times daily as needed       albuterol (PROAIR HFA/PROVENTIL HFA/VENTOLIN HFA) 108 (90 Base) MCG/ACT inhaler Inhale 2 puffs into the lungs every 4 hours as needed for shortness of breath or wheezing 18 g 4     Ascorbic Acid (VITAMIN C) 500 MG CAPS Take 1,000 mg by mouth daily       aspirin (ASA) 81 MG EC tablet Take 1 tablet (81 mg) by mouth daily 90 tablet 0     atorvastatin (LIPITOR) 40 MG tablet Take 40 mg by mouth every evening       budesonide-formoterol (SYMBICORT) 160-4.5 MCG/ACT Inhaler Inhale 2 puffs into the lungs 2 times daily 10.2 g 11     bumetanide (BUMEX) 2 MG tablet Take 1 tablet (2 mg) by mouth 2 times daily 180 tablet 3     co-enzyme Q-10 100 MG CAPS capsule Take 2 capsules (200 mg) by mouth daily 2 capsule      empagliflozin (JARDIANCE) 10 MG TABS tablet Take 1 tablet (10 mg) by mouth daily 90 tablet 3     fish oil-omega-3 fatty acids 1000 MG capsule Take 1 g by mouth 2 times daily       FLUoxetine (PROZAC) 20 MG capsule TAKE 1 CAPSULE BY MOUTH EVERY DAY       MAG64 64 MG TBEC CR tablet TAKE 2 TABLETS BY MOUTH EVERY  tablet 1     metoprolol succinate ER (TOPROL XL) 25 MG 24 hr tablet Take 1 tablet (25 mg) by mouth daily 30 tablet 11     multivitamin, therapeutic (THERA-VIT) TABS tablet Take 1 tablet by mouth daily       nitroGLYcerin (NITROSTAT) 0.4 MG sublingual tablet One tablet under the tongue every 5 minutes if needed for chest pain. May repeat every 5 minutes for a maximum of 3 doses in 15 minutes\" 25 tablet 3     OXYGEN-HELIUM IN 4-5 L        polyethylene glycol (MIRALAX) 17 GM/Dose powder Take 17 " "g by mouth daily        sodium chloride (OCEAN) 0.65 % nasal spray as directed Nasally       tiotropium (SPIRIVA) 18 MCG inhaled capsule Inhale 1 capsule (18 mcg) into the lungs daily 30 capsule 11     vitamin D2 (ERGOCALCIFEROL) 14414 units (1250 mcg) capsule Take 50,000 Units by mouth twice a week On Sunday and Wednesday       warfarin ANTICOAGULANT (COUMADIN) 2.5 MG tablet Take 1 tablet (2.5 mg) by mouth daily Take 1/2 tablet Sun, Tue, Fri; 1 tablet all other days. Adjust as directed by INR clinic. 90 tablet 1       Allergies   Allergen Reactions     Adhesive Tape Other (See Comments)     ADHESIVE TAPE; SKIN IRRITATION; Skin pulled off with foam tape       Amiodarone      ADVERSE REACTION.  Sunlight sensitivity.     Lisinopril           Lab Results    Chemistry/lipid CBC Cardiac Enzymes/BNP/TSH/INR   Recent Labs   Lab Test 10/06/22  1240   CHOL 109   HDL 58   LDL 43   TRIG 39     Recent Labs   Lab Test 10/06/22  1240 02/15/22  0941 09/09/21  1110   LDL 43 55 54     Recent Labs   Lab Test 11/14/23  1522      POTASSIUM 3.8   CHLORIDE 104   CO2 28   *   BUN 34.5*   CR 1.63*   GFRESTIMATED 43*   AMBIKA 9.5     Recent Labs   Lab Test 11/14/23  1522 08/17/23  1024 07/12/23  1539   CR 1.63* 1.63* 1.53*     Recent Labs   Lab Test 10/19/23  1135   A1C 6.5*          Recent Labs   Lab Test 06/01/23  0539   WBC 12.6*   HGB 11.7*   HCT 36.2*      *     Recent Labs   Lab Test 06/01/23  0539 05/25/23  0444 05/23/23  0438   HGB 11.7* 12.2* 11.8*    No results for input(s): \"TROPONINI\" in the last 71772 hours.  Recent Labs   Lab Test 05/25/23  0444 05/18/23  0855 10/05/22  1002 05/17/22  1149 02/15/22  0941 02/09/22  1032 12/01/20  0958   BNP  --   --   --   --   --   --  265*   NTBNPI 11,084* 3,644*  --   --   --   --   --    NTBNP  --   --  3,598* 2,976* 2,870*   < >  --     < > = values in this interval not displayed.     Recent Labs   Lab Test 05/18/23  0855   TSH 2.38     Recent Labs   Lab Test " 12/05/23  0000 11/28/23  0000 11/21/23  0000   INR 2.7 2.9 2.4        Elo Collins MD

## 2023-12-06 NOTE — LETTER
12/6/2023    Vivek Guerrero MD  [address]  [facility] [address]    RE: Buck Sinclair       Dear Colleague,     I had the pleasure of seeing Buck Sinclair in the [facility].    HEART CARE ENCOUNTER CONSULTATON NOTE      [facility]  [phone]      Assessment/Recommendations   Assessment/Plan:    Buck Sinclair is a very pleasant 78 year old male CAD s/p PCI, paroxysmal atrial fibrillation s/p PVI x2 in 2011, hypertension, COPD on oxygen, pulmonary hypertension, CKD who presents today to the EP clinic.    Permanent atrial fibrillation  - s/p PVIx2 in 2011  - will stop Sotalol and switch to Metoprolol succinate 25 mg daily(also has CKD with GFR of 43)    2. Anticoagulation  - on warfarin  - continue same for now    3. HTN  - well controlled  - continue same meds    4. CAD s/p PCI  - stable  - continue same meds    5. ICD in situ  - RA lead is undersensing and not capturing  - given permanent AF will switch to VVIR mode  - continue remote checks    Time spent: 45 minutes spent on the date of the encounter doing chart review, history and exam, documentation and further activities as noted above.           History of Present Illness/Subjective    HPI: Buck Sinclair is a very pleasant 78 year old male CAD s/p PCI, paroxysmal atrial fibrillation s/p PVI x2 in 2011, hypertension, COPD on oxygen, pulmonary hypertension, CKD who presents today to the EP clinic.    He keeps busy on a daily basis by working in his garage. He has been on continuous Oxygen for 3 years now. No significant change in his health status over the last few months.    His RA lead has been undersensing and not capturing. He is currently in permanent AF.     Recent Echocardiogram Results (personally reviewed):  2023    Interpretation Summary     Left ventricular size, wall motion and function are normal. The ejection  fraction is 55-60%.  The right ventricle is moderately  dilated.  Moderately decreased right ventricular systolic function  The left atrium is moderately dilated.  The right atrium is severely dilated.  There is moderate to mod-severe (2-3+) tricuspid regurgitation.  Severe (>55mmHg) pulmonary hypertension is present.  IVC diameter >2.1 cm collapsing <50% with sniff suggests a high RA pressure  estimated at 15 mmHg or greater.  _______________________________      Labs below reviewed personally     Physical Examination  Review of Systems   Vitals: /70 (BP Location: Left arm, Patient Position: Sitting, Cuff Size: Adult Large)   Pulse 70   Resp 20   SpO2 97%   BMI= There is no height or weight on file to calculate BMI.  Wt Readings from Last 3 Encounters:   11/14/23 111.2 kg (245 lb 3.2 oz)   09/28/23 112.5 kg (248 lb)   08/17/23 115.2 kg (254 lb)       General Appearance:   no distress, normal body habitus   ENT/Mouth: membranes moist, no oral lesions or bleeding gums.      EYES:  no scleral icterus, normal conjunctivae   Neck: no carotid bruits or thyromegaly   Chest/Lungs:   +wheezing, no sternal scar, equal chest wall expansion    Cardiovascular:   Irregular. Normal first and second heart sounds with no murmurs, rubs, or gallops; the carotid, radial and posterior tibial pulses are intact, no edema bilaterally    Abdomen:  no organomegaly, masses, bruits, or tenderness; bowel sounds are present   Extremities: no cyanosis or clubbing   Skin: no xanthelasma, warm.    Neurologic: normal  bilateral, no tremors     Psychiatric: alert and oriented x3, calm        Please refer above for cardiac ROS details.        Medical History  Surgical History Family History Social History   Past Medical History:   Diagnosis Date    Anemia     Asthma without status asthmaticus 05/05/2021    Atrial fibrillation (H)     BPH (benign prostatic hyperplasia)     Cardiomyopathy (H)     CKD (chronic kidney disease) stage 3, GFR 30-59 ml/min (H) 05/24/2023    Congestive heart failure  (H)     COPD, group B, by GOLD 2017 classification (H)     Coronary artery disease due to calcified coronary lesion     Dyslipidemia, goal LDL below 70     Essential hypertension     Heart failure with reduced ejection fraction (H) 08/17/2023    Hemoptysis 10/04/2017    History of transfusion     Hyperlipidemia     Persistent atrial fibrillation (H)     Pneumonia of left lower lobe due to infectious organism 10/04/2017    Pulmonary hypertension (H)     Skin cancer of trunk     Status post catheter ablation of atrial fibrillation 06/07/2017    PVI 4-2011 (Cryo/PVI + roof line + CTI line) Re-do PVI 7-2011 (RFA/PVI + CFE + VIDYA + confirmed CTI line)    Ventricular tachycardia (H)      Past Surgical History:   Procedure Laterality Date    CARDIAC DEFIBRILLATOR PLACEMENT      CARDIOVERSION  07/11/2018    x20, last 2/12/15, 10/2015, 11/18/16, 6/16/17 by Lauren Foster CNP    CARDIOVERSION  07/11/2018    CARDIOVERSION  11/19/2021    COLONOSCOPY N/A 04/28/2017    Procedure: COLONOSCOPY with 2 ascending polyps and 1 transverse polyp;  Surgeon: Jose Whittington MD;  Location: [facility];  Service:     CORONARY ANGIOGRAPHY ADULT ORDER      CV CORONARY ANGIOGRAM N/A 09/20/2017    Procedure: Coronary Angiogram;  Surgeon: Sergio Cervantes MD;  Location: [facility];  Service:     CV CORONARY ANGIOGRAM N/A 01/24/2022    Procedure: Coronary Angiogram;  Surgeon: Christi Saunders MD;  Location: [facility] CV    CV LEFT HEART CATH N/A 01/24/2022    Procedure: Left Heart Cath;  Surgeon: Christi Saunders MD;  Location: [facility] CV    CV RIGHT AND LEFT HEART CATH N/A 01/24/2022    Procedure: Right and Left Heart Catherization;  Surgeon: Christi Saunders MD;  Location: [facility] CV    EP ICD GENERATOR REPLACEMENT DUAL N/A 10/28/2022    Procedure: Implantable Cardioverter Defibrillator Generator Replacement Dual;  Surgeon: Elo Collins MD;  Location: [facility] CV    EP ICD INSERT       FRACTURE SURGERY Left     wrist    HEART CATH, ANGIOPLASTY      IMPLANT AUTOMATIC IMPLANTABLE CARDIOVERTER DEFIBRILLATOR      INGUINAL HERNIA REPAIR Left     while in the [employer] in [place] after 13 month in [place]    INSERT / REPLACE / REMOVE PACEMAKER      IR MISCELLANEOUS PROCEDURE  2014    OTHER SURGICAL HISTORY      left hand surgery---tendon repair    HI ABLATE HEART DYSRHYTHM FOCUS  2011    Catheter Ablation Atrial Fibrillation PVI 2011 (Cryo+RF-PVI + roof line + CTI line)    HI ABLATE HEART DYSRHYTHM FOCUS  2011    Re-do PVI 2011 (RFA-PVI + CFE + VIDYA + confirmation of CTI line)    TOTAL SHOULDER REPLACEMENT Right 2016    Dr. Abernathy of [facility]    WRIST SURGERY Left     ZZC MYERS W/O FACETEC FORAMOT/DSKC  VRT SEG, CERVICAL      Laminectomy Lumbar;  Recorded: 2012;     Family History   Problem Relation Age of Onset    Cancer Mother         leukemia    Cancer Father         bladder    Cancer Sister         breast with lung met.    Aneurysm Sister     CABG Brother     CABG Brother     Valvular heart disease Brother         valve replacement        Social History     Socioeconomic History    Marital status:      Spouse name: Not on file    Number of children: Not on file    Years of education: Not on file    Highest education level: Not on file   Occupational History    Not on file   Tobacco Use    Smoking status: Former     Packs/day: 1.00     Years: 4.00     Additional pack years: 0.00     Total pack years: 4.00     Types: Cigarettes     Quit date: 1968     Years since quittin.9    Smokeless tobacco: Never   Vaping Use    Vaping Use: Never used   Substance and Sexual Activity    Alcohol use: Yes     Alcohol/week: 2.0 standard drinks of alcohol     Comment: Alcoholic Drinks/day: 1 beer per week    Drug use: No    Sexual activity: Yes     Partners: Female     Birth control/protection: Post-menopausal   Other Topics Concern    Parent/sibling w/  CABG, MI or angioplasty before 65F 55M? Not Asked   Social History Narrative    Preloaded 01/14/2013     Social Determinants of Health     Financial Resource Strain: Not on file   Food Insecurity: Not on file   Transportation Needs: Not on file   Physical Activity: Not on file   Stress: Not on file   Social Connections: Not on file   Interpersonal Safety: Not on file   Housing Stability: Not on file           Medications  Allergies   Current Outpatient Medications   Medication Sig Dispense Refill    acetaminophen (TYLENOL) 325 MG tablet Take 650 mg by mouth 2 times daily as needed      albuterol (PROAIR HFA/PROVENTIL HFA/VENTOLIN HFA) 108 (90 Base) MCG/ACT inhaler Inhale 2 puffs into the lungs every 4 hours as needed for shortness of breath or wheezing 18 g 4    Ascorbic Acid (VITAMIN C) 500 MG CAPS Take 1,000 mg by mouth daily      aspirin (ASA) 81 MG EC tablet Take 1 tablet (81 mg) by mouth daily 90 tablet 0    atorvastatin (LIPITOR) 40 MG tablet Take 40 mg by mouth every evening      budesonide-formoterol (SYMBICORT) 160-4.5 MCG/ACT Inhaler Inhale 2 puffs into the lungs 2 times daily 10.2 g 11    bumetanide (BUMEX) 2 MG tablet Take 1 tablet (2 mg) by mouth 2 times daily 180 tablet 3    co-enzyme Q-10 100 MG CAPS capsule Take 2 capsules (200 mg) by mouth daily 2 capsule     empagliflozin (JARDIANCE) 10 MG TABS tablet Take 1 tablet (10 mg) by mouth daily 90 tablet 3    fish oil-omega-3 fatty acids 1000 MG capsule Take 1 g by mouth 2 times daily      FLUoxetine (PROZAC) 20 MG capsule TAKE 1 CAPSULE BY MOUTH EVERY DAY      MAG64 64 MG TBEC CR tablet TAKE 2 TABLETS BY MOUTH EVERY  tablet 1    metoprolol succinate ER (TOPROL XL) 25 MG 24 hr tablet Take 1 tablet (25 mg) by mouth daily 30 tablet 11    multivitamin, therapeutic (THERA-VIT) TABS tablet Take 1 tablet by mouth daily      nitroGLYcerin (NITROSTAT) 0.4 MG sublingual tablet One tablet under the tongue every 5 minutes if needed for chest pain. May repeat  "every 5 minutes for a maximum of 3 doses in 15 minutes\" 25 tablet 3    OXYGEN-HELIUM IN 4-5 L       polyethylene glycol (MIRALAX) 17 GM/Dose powder Take 17 g by mouth daily       sodium chloride (OCEAN) 0.65 % nasal spray as directed Nasally      tiotropium (SPIRIVA) 18 MCG inhaled capsule Inhale 1 capsule (18 mcg) into the lungs daily 30 capsule 11    vitamin D2 (ERGOCALCIFEROL) 42838 units (1250 mcg) capsule Take 50,000 Units by mouth twice a week On Sunday and Wednesday      warfarin ANTICOAGULANT (COUMADIN) 2.5 MG tablet Take 1 tablet (2.5 mg) by mouth daily Take 1/2 tablet Sun, Tue, Fri; 1 tablet all other days. Adjust as directed by INR clinic. 90 tablet 1       Allergies   Allergen Reactions    Adhesive Tape Other (See Comments)     ADHESIVE TAPE; SKIN IRRITATION; Skin pulled off with foam tape      Amiodarone      ADVERSE REACTION.  Sunlight sensitivity.    Lisinopril           Lab Results    Chemistry/lipid CBC Cardiac Enzymes/BNP/TSH/INR   Recent Labs   Lab Test 10/06/22  1240   CHOL 109   HDL 58   LDL 43   TRIG 39     Recent Labs   Lab Test 10/06/22  1240 02/15/22  0941 09/09/21  1110   LDL 43 55 54     Recent Labs   Lab Test 11/14/23  1522      POTASSIUM 3.8   CHLORIDE 104   CO2 28   *   BUN 34.5*   CR 1.63*   GFRESTIMATED 43*   AMBIKA 9.5     Recent Labs   Lab Test 11/14/23  1522 08/17/23  1024 07/12/23  1539   CR 1.63* 1.63* 1.53*     Recent Labs   Lab Test 10/19/23  1135   A1C 6.5*          Recent Labs   Lab Test 06/01/23  0539   WBC 12.6*   HGB 11.7*   HCT 36.2*      *     Recent Labs   Lab Test 06/01/23  0539 05/25/23  0444 05/23/23  0438   HGB 11.7* 12.2* 11.8*    No results for input(s): \"TROPONINI\" in the last 75349 hours.  Recent Labs   Lab Test 05/25/23  0444 05/18/23  0855 10/05/22  1002 05/17/22  1149 02/15/22  0941 02/09/22  1032 12/01/20  0958   BNP  --   --   --   --   --   --  265*   NTBNPI 11,084* 3,644*  --   --   --   --   --    NTBNP  --   --  3,598* 2,976* " 2,870*   < >  --     < > = values in this interval not displayed.     Recent Labs   Lab Test 05/18/23  0855   TSH 2.38     Recent Labs   Lab Test 12/05/23  0000 11/28/23  0000 11/21/23  0000   INR 2.7 2.9 2.4        Elo Collins MD                Thank you for allowing me to participate in the care of your patient.      Sincerely,     Elo Collins MD     [facility]  cc:   Jillian Prince MD  [address]  [place] [address]

## 2023-12-06 NOTE — PATIENT INSTRUCTIONS
[facility]  Cardiac Electrophysiology  [address]  [place] [address]   Office: [phone]  Fax: [phone]       Thank you for seeing us in clinic today - it is a pleasure to be a part of your care team.  Below is a summary of our plan from today's visit.      Stop Sotalol  Start Metoprolol 25 mg daily      Please do not hesitate to be in touch with our office at [phone] with any questions that may arise.      Thank you for trusting us with your care,    Elo Collins MD  Clinical Cardiac Electrophysiology  [facility]  [address]  [place] [address]   Office: [phone]  Fax: [phone]

## 2023-12-11 NOTE — TELEPHONE ENCOUNTER
Prior Authorization Not Needed per Insurance    Medication: Metoprolol Succinate ER 25MG er tablets    Insurance Company: [employer] Part D - Phone [phone] Fax [phone]  Expected CoPay:      Pharmacy Filling the Rx: [id number] [place], [place] - [facility] [address] AT [facility] [address]  Pharmacy Notified:  yes  Patient Notified:  yes- Pharmacy will contact patient when ready to /ship    Medication is covered under plan. Called [facility] to make sure they are billing the correct insurance.  Gave them the help desk phone number [phone] if they need further assistance. No further action needed by PA team, drug is covered without a PA

## 2023-12-12 NOTE — TELEPHONE ENCOUNTER
Prior Authorization Not Needed per Insurance    Medication: SPIRIVA HANDIHALER 18 MCG IN CAPS  Insurance Company: [employer] Part D - Phone [phone] Fax [phone]  Expected CoPay: $    Pharmacy Filling the Rx: [id number] [place] [id number]  Pharmacy Notified: YES  Patient Notified: **Instructed pharmacy to notify patient when script is ready to /ship.**    Insurance prefers brand name

## 2023-12-12 NOTE — PROGRESS NOTES
ANTICOAGULATION  MANAGEMENT-Home Monitor Managed by Exception    Buck Sinclair 78 year old male is on warfarin with therapeutic INR result. (Goal INR 2.0-3.0)    Recent labs: (last 7 days)     12/12/23  0000   INR 2.9       Previous INR was Therapeutic  Medication, diet, health changes since last INR:chart reviewed; none identified  Contacted within the last 12 weeks by phone on 10/24/23  Last [facility] referral date: 09/12/2023      LUCAS Jane was NOT contacted regarding therapeutic result today per home monitoring policy manage by exception agreement.   Current warfarin dose is to be continued:     Summary  As of 12/12/2023      Full warfarin instructions:  2.5 mg every day   Next INR check:  12/19/2023             ?   Leana Nayak RN  Anticoagulation Clinic  12/12/2023    _______________________________________________________________________     Anticoagulation Episode Summary       Current INR goal:  2.0-3.0   TTR:  76.8% (11.6 mo)   Target end date:  Indefinite   Send INR reminders to:  ANTICOAG HOME MONITORING    Indications    Persistent Atrial Fibrillation (Resolved) [I48.91]  Paroxysmal atrial fibrillation (H) [I48.0]  Persistent atrial fibrillation (H) [I48.19]             Comments:  Home monitor ( [employer] )managed by exception             Anticoagulation Care Providers       Provider Role Specialty Phone number    Erica Hansen APRN CNP Referring Cardiovascular Disease [phone]    Domo Garrett MD Referring Cardiovascular Disease [phone]    Everett Renee MD  Cardiovascular Disease [phone]

## 2023-12-12 NOTE — TELEPHONE ENCOUNTER
Prior Authorization Retail Medication Request    Medication/Dose: Tiotropium bromide monohydrate 18mcg capsules  Diagnosis and ICD code (if different than what is on RX):  J44.9  New/renewal/insurance change PA/secondary ins. PA:  Previously Tried and Failed:    Rationale:  patient has been well controlled on this medication for over 2 years. Would like to continue as it has been helpful for his symptoms.    Insurance   Primary: [facility] MEDICARE ADVANTAGE   Insurance ID:  [id number]     Secondary (if applicable):  Insurance ID:      Pharmacy Information (if different than what is on RX)  Name:  [facility], [address], [place]  Phone:  [phone]  Fax:[phone]

## 2023-12-19 NOTE — PROGRESS NOTES
ANTICOAGULATION  MANAGEMENT-Home Monitor Managed by Exception    Buck Sinclair 78 year old male is on warfarin with therapeutic INR result. (Goal INR 2.0-3.0)    Recent labs: (last 7 days)     12/19/23  0000   INR 2.4       Previous INR was Therapeutic  Medication, diet, health changes since last INR:chart reviewed; none identified  Contacted within the last 12 weeks by phone on 10/24/23  Last ACC referral date: 09/12/2023      LUCAS Jane was NOT contacted regarding therapeutic result today per home monitoring policy manage by exception agreement.   Current warfarin dose is to be continued:     Summary  As of 12/19/2023      Full warfarin instructions:  2.5 mg every day   Next INR check:  12/26/2023             ?   Marina Velazco RN  Anticoagulation Clinic  12/19/2023    _______________________________________________________________________     Anticoagulation Episode Summary       Current INR goal:  2.0-3.0   TTR:  76.8% (11.6 mo)   Target end date:  Indefinite   Send INR reminders to:  ANTICOAG HOME MONITORING    Indications    Persistent Atrial Fibrillation (Resolved) [I48.91]  Paroxysmal atrial fibrillation (H) [I48.0]  Persistent atrial fibrillation (H) [I48.19]             Comments:  Home monitor ( [employer] )managed by exception             Anticoagulation Care Providers       Provider Role Specialty Phone number    Erica Hansen APRN CNP Referring Cardiovascular Disease [phone]    Domo Garrett MD Referring Cardiovascular Disease [phone]    Everett Renee MD  Cardiovascular Disease [phone]

## 2023-12-27 NOTE — PROGRESS NOTES
ANTICOAGULATION  MANAGEMENT-Home Monitor Managed by Exception    Buck Sinclair 78 year old male is on warfarin with therapeutic INR result. (Goal INR 2.0-3.0)    Recent labs: (last 7 days)     12/27/23  0000   INR 2.8       Previous INR was Therapeutic  Medication, diet, health changes since last INR:chart reviewed; none identified  Contacted within the last 12 weeks by phone on 10/24/23   Last ACC referral date: 09/12/2023      LUCAS Jane was NOT contacted regarding therapeutic result today per home monitoring policy manage by exception agreement.   Current warfarin dose is to be continued:     Summary  As of 12/27/2023      Full warfarin instructions:  2.5 mg every day   Next INR check:  1/10/2024             ?   Desire Cortez RN  Anticoagulation Clinic  12/27/2023    _______________________________________________________________________     Anticoagulation Episode Summary       Current INR goal:  2.0-3.0   TTR:  76.8% (11.6 mo)   Target end date:  Indefinite   Send INR reminders to:  ANTICOAG HOME MONITORING    Indications    Persistent Atrial Fibrillation (Resolved) [I48.91]  Paroxysmal atrial fibrillation (H) [I48.0]  Persistent atrial fibrillation (H) [I48.19]             Comments:  Home monitor ( [employer] )managed by exception             Anticoagulation Care Providers       Provider Role Specialty Phone number    Erica Hansen APRN CNP Referring Cardiovascular Disease [phone]    Domo Garrett MD Referring Cardiovascular Disease [phone]    Everett Renee MD  Cardiovascular Disease [phone]

## 2023-12-28 NOTE — TELEPHONE ENCOUNTER
ANTICOAGULATION  MANAGEMENT    Assessment     Today's INR result of 2.4 is Therapeutic (goal INR of 2.0-3.0)        Warfarin taken as previously instructed    No new diet changes affecting INR    No new medication/supplements affecting INR    Continues to tolerate warfarin with no reported s/s of bleeding or thromboembolism     Previous INR was Therapeutic    Plan:     Spoke with Neela regarding INR result and instructed:     Warfarin Dosing Instructions:  Continue current warfarin dose    5 mg every Mon, Fri; 2.5 mg all other days      (0 % change)    Instructed patient to follow up no later than: 1-2 weeks with home monitor.    Education provided: importance of therapeutic range and target INR goal and significance of current INR result    Neela verbalizes understanding and agrees to warfarin dosing plan.    Instructed to call the [facility] for any changes, questions or concerns. (#[phone])   ?   Gisele Prince RN    Subjective/Objective:      Buck Sinclair, a 75 y.o. male is on warfarin.     Buck reports:     Home warfarin dose: verbally confirmed home dose with Neela and updated on anticoagulation calendar     Missed doses: No     Medication changes:  No     S/S of bleeding or thromboembolism:  No     New Injury or illness:  No     Changes in diet or alcohol consumption:  No     Upcoming surgery, procedure or cardioversion:  No    Anticoagulation Episode Summary     Current INR goal:   2.0-3.0   TTR:   84.8 % (1 y)   Next INR check:   5/12/2020   INR from last check:   2.40 (5/5/2020)   Weekly max warfarin dose:      Target end date:      INR check location:      Preferred lab:      Send INR reminders to:   [facility]    Indications    Persistent Atrial Fibrillation [I48.91]           Comments:   home monitor talk to wife about dose Neela   [phone]            Anticoagulation Care Providers     Provider Role Specialty Phone number    Erica Hansen, CNP  Cardiology  [phone]    Everett Renee MD  Cardiology [phone]         Vaccine status unknown

## 2024-01-01 ENCOUNTER — APPOINTMENT (OUTPATIENT)
Dept: PHYSICAL THERAPY | Facility: CLINIC | Age: 79
DRG: 286 | End: 2024-01-01
Attending: INTERNAL MEDICINE
Payer: OTHER MISCELLANEOUS

## 2024-01-01 ENCOUNTER — APPOINTMENT (OUTPATIENT)
Dept: SPEECH THERAPY | Facility: CLINIC | Age: 79
DRG: 286 | End: 2024-01-01
Attending: INTERNAL MEDICINE
Payer: OTHER MISCELLANEOUS

## 2024-01-01 ENCOUNTER — APPOINTMENT (OUTPATIENT)
Dept: ULTRASOUND IMAGING | Facility: CLINIC | Age: 79
DRG: 286 | End: 2024-01-01
Payer: OTHER MISCELLANEOUS

## 2024-01-01 ENCOUNTER — HEALTH MAINTENANCE LETTER (OUTPATIENT)
Age: 79
End: 2024-01-01

## 2024-01-01 ENCOUNTER — APPOINTMENT (OUTPATIENT)
Dept: PHYSICAL THERAPY | Facility: CLINIC | Age: 79
DRG: 286 | End: 2024-01-01
Attending: STUDENT IN AN ORGANIZED HEALTH CARE EDUCATION/TRAINING PROGRAM
Payer: OTHER MISCELLANEOUS

## 2024-01-01 ENCOUNTER — APPOINTMENT (OUTPATIENT)
Dept: GENERAL RADIOLOGY | Facility: CLINIC | Age: 79
DRG: 286 | End: 2024-01-01
Attending: STUDENT IN AN ORGANIZED HEALTH CARE EDUCATION/TRAINING PROGRAM
Payer: OTHER MISCELLANEOUS

## 2024-01-01 ENCOUNTER — APPOINTMENT (OUTPATIENT)
Dept: GENERAL RADIOLOGY | Facility: CLINIC | Age: 79
DRG: 286 | End: 2024-01-01
Attending: INTERNAL MEDICINE
Payer: OTHER MISCELLANEOUS

## 2024-01-01 ENCOUNTER — DOCUMENTATION ONLY (OUTPATIENT)
Dept: ANTICOAGULATION | Facility: CLINIC | Age: 79
End: 2024-01-01
Payer: COMMERCIAL

## 2024-01-01 ENCOUNTER — DOCUMENTATION ONLY (OUTPATIENT)
Dept: ANTICOAGULATION | Facility: CLINIC | Age: 79
End: 2024-01-01

## 2024-01-01 ENCOUNTER — APPOINTMENT (OUTPATIENT)
Dept: CARDIOLOGY | Facility: HOSPITAL | Age: 79
DRG: 682 | End: 2024-01-01
Attending: FAMILY MEDICINE
Payer: OTHER MISCELLANEOUS

## 2024-01-01 ENCOUNTER — APPOINTMENT (OUTPATIENT)
Dept: NUCLEAR MEDICINE | Facility: CLINIC | Age: 79
DRG: 286 | End: 2024-01-01
Attending: INTERNAL MEDICINE
Payer: OTHER MISCELLANEOUS

## 2024-01-01 ENCOUNTER — ANCILLARY PROCEDURE (OUTPATIENT)
Dept: CARDIOLOGY | Facility: CLINIC | Age: 79
End: 2024-01-01
Attending: INTERNAL MEDICINE
Payer: COMMERCIAL

## 2024-01-01 ENCOUNTER — CARE COORDINATION (OUTPATIENT)
Dept: CARDIOLOGY | Facility: CLINIC | Age: 79
End: 2024-01-01

## 2024-01-01 ENCOUNTER — TELEPHONE (OUTPATIENT)
Dept: PULMONOLOGY | Facility: CLINIC | Age: 79
End: 2024-01-01
Payer: COMMERCIAL

## 2024-01-01 ENCOUNTER — OFFICE VISIT (OUTPATIENT)
Dept: PULMONOLOGY | Facility: CLINIC | Age: 79
End: 2024-01-01
Payer: OTHER MISCELLANEOUS

## 2024-01-01 ENCOUNTER — ANTICOAGULATION THERAPY VISIT (OUTPATIENT)
Dept: ANTICOAGULATION | Facility: CLINIC | Age: 79
End: 2024-01-01
Payer: COMMERCIAL

## 2024-01-01 ENCOUNTER — APPOINTMENT (OUTPATIENT)
Dept: NUCLEAR MEDICINE | Facility: CLINIC | Age: 79
DRG: 286 | End: 2024-01-01
Attending: STUDENT IN AN ORGANIZED HEALTH CARE EDUCATION/TRAINING PROGRAM
Payer: OTHER MISCELLANEOUS

## 2024-01-01 ENCOUNTER — APPOINTMENT (OUTPATIENT)
Dept: SPEECH THERAPY | Facility: CLINIC | Age: 79
DRG: 286 | End: 2024-01-01
Payer: OTHER MISCELLANEOUS

## 2024-01-01 ENCOUNTER — APPOINTMENT (OUTPATIENT)
Dept: CT IMAGING | Facility: HOSPITAL | Age: 79
DRG: 682 | End: 2024-01-01
Attending: INTERNAL MEDICINE
Payer: OTHER MISCELLANEOUS

## 2024-01-01 ENCOUNTER — APPOINTMENT (OUTPATIENT)
Dept: RADIOLOGY | Facility: HOSPITAL | Age: 79
DRG: 682 | End: 2024-01-01
Attending: EMERGENCY MEDICINE
Payer: OTHER MISCELLANEOUS

## 2024-01-01 ENCOUNTER — TELEPHONE (OUTPATIENT)
Dept: CARDIOLOGY | Facility: CLINIC | Age: 79
End: 2024-01-01
Payer: COMMERCIAL

## 2024-01-01 ENCOUNTER — TELEPHONE (OUTPATIENT)
Dept: PULMONOLOGY | Facility: CLINIC | Age: 79
End: 2024-01-01

## 2024-01-01 ENCOUNTER — APPOINTMENT (OUTPATIENT)
Dept: RADIOLOGY | Facility: HOSPITAL | Age: 79
DRG: 682 | End: 2024-01-01
Attending: INTERNAL MEDICINE
Payer: OTHER MISCELLANEOUS

## 2024-01-01 ENCOUNTER — TELEPHONE (OUTPATIENT)
Dept: SCHEDULING | Facility: CLINIC | Age: 79
End: 2024-01-01
Payer: COMMERCIAL

## 2024-01-01 ENCOUNTER — LAB (OUTPATIENT)
Dept: LAB | Facility: HOSPITAL | Age: 79
End: 2024-01-01
Payer: COMMERCIAL

## 2024-01-01 ENCOUNTER — APPOINTMENT (OUTPATIENT)
Dept: ULTRASOUND IMAGING | Facility: CLINIC | Age: 79
DRG: 286 | End: 2024-01-01
Attending: INTERNAL MEDICINE
Payer: OTHER MISCELLANEOUS

## 2024-01-01 ENCOUNTER — HOSPITAL ENCOUNTER (INPATIENT)
Facility: CLINIC | Age: 79
LOS: 19 days | DRG: 286 | End: 2024-03-28
Attending: INTERNAL MEDICINE | Admitting: INTERNAL MEDICINE
Payer: OTHER MISCELLANEOUS

## 2024-01-01 ENCOUNTER — APPOINTMENT (OUTPATIENT)
Dept: CT IMAGING | Facility: CLINIC | Age: 79
DRG: 286 | End: 2024-01-01
Attending: INTERNAL MEDICINE
Payer: OTHER MISCELLANEOUS

## 2024-01-01 ENCOUNTER — TELEPHONE (OUTPATIENT)
Dept: CARDIOLOGY | Facility: CLINIC | Age: 79
End: 2024-01-01

## 2024-01-01 ENCOUNTER — APPOINTMENT (OUTPATIENT)
Dept: GENERAL RADIOLOGY | Facility: CLINIC | Age: 79
DRG: 286 | End: 2024-01-01
Attending: PEDIATRICS
Payer: OTHER MISCELLANEOUS

## 2024-01-01 ENCOUNTER — LAB (OUTPATIENT)
Dept: LAB | Facility: HOSPITAL | Age: 79
DRG: 682 | End: 2024-01-01
Payer: OTHER MISCELLANEOUS

## 2024-01-01 ENCOUNTER — CONSULT REPORT SCAN (OUTPATIENT)
Dept: CARDIOLOGY | Facility: CLINIC | Age: 79
End: 2024-01-01

## 2024-01-01 ENCOUNTER — APPOINTMENT (OUTPATIENT)
Dept: CARDIOLOGY | Facility: CLINIC | Age: 79
DRG: 286 | End: 2024-01-01
Attending: STUDENT IN AN ORGANIZED HEALTH CARE EDUCATION/TRAINING PROGRAM
Payer: OTHER MISCELLANEOUS

## 2024-01-01 ENCOUNTER — HOSPITAL ENCOUNTER (INPATIENT)
Facility: HOSPITAL | Age: 79
LOS: 1 days | Discharge: ANOTHER HOSPITAL | DRG: 682 | End: 2024-03-09
Attending: EMERGENCY MEDICINE | Admitting: FAMILY MEDICINE
Payer: OTHER MISCELLANEOUS

## 2024-01-01 VITALS
BODY MASS INDEX: 26.66 KG/M2 | TEMPERATURE: 97.1 F | HEART RATE: 75 BPM | RESPIRATION RATE: 18 BRPM | DIASTOLIC BLOOD PRESSURE: 61 MMHG | WEIGHT: 214.4 LBS | OXYGEN SATURATION: 92 % | HEIGHT: 75 IN | SYSTOLIC BLOOD PRESSURE: 107 MMHG

## 2024-01-01 VITALS
HEART RATE: 85 BPM | BODY MASS INDEX: 30.72 KG/M2 | SYSTOLIC BLOOD PRESSURE: 141 MMHG | OXYGEN SATURATION: 91 % | DIASTOLIC BLOOD PRESSURE: 76 MMHG | WEIGHT: 245.8 LBS

## 2024-01-01 DIAGNOSIS — I48.19 PERSISTENT ATRIAL FIBRILLATION (H): ICD-10-CM

## 2024-01-01 DIAGNOSIS — I48.0 PAROXYSMAL ATRIAL FIBRILLATION (H): Primary | ICD-10-CM

## 2024-01-01 DIAGNOSIS — N17.9 ACUTE KIDNEY INJURY (H): ICD-10-CM

## 2024-01-01 DIAGNOSIS — R06.09 DOE (DYSPNEA ON EXERTION): Primary | ICD-10-CM

## 2024-01-01 DIAGNOSIS — I48.20 CHRONIC ATRIAL FIBRILLATION (H): ICD-10-CM

## 2024-01-01 DIAGNOSIS — I50.20 HEART FAILURE WITH REDUCED EJECTION FRACTION (H): ICD-10-CM

## 2024-01-01 DIAGNOSIS — I50.20 HEART FAILURE WITH REDUCED EJECTION FRACTION (H): Primary | ICD-10-CM

## 2024-01-01 DIAGNOSIS — R06.09 DOE (DYSPNEA ON EXERTION): ICD-10-CM

## 2024-01-01 DIAGNOSIS — I25.84 CORONARY ARTERY DISEASE DUE TO CALCIFIED CORONARY LESION: ICD-10-CM

## 2024-01-01 DIAGNOSIS — I50.9 CONGESTIVE HEART FAILURE, UNSPECIFIED HF CHRONICITY, UNSPECIFIED HEART FAILURE TYPE (H): Primary | ICD-10-CM

## 2024-01-01 DIAGNOSIS — J44.9 COPD, GROUP B, BY GOLD 2017 CLASSIFICATION (H): ICD-10-CM

## 2024-01-01 DIAGNOSIS — E87.6 HYPOKALEMIA: ICD-10-CM

## 2024-01-01 DIAGNOSIS — I50.9 CONGESTIVE HEART FAILURE, UNSPECIFIED HF CHRONICITY, UNSPECIFIED HEART FAILURE TYPE (H): ICD-10-CM

## 2024-01-01 DIAGNOSIS — I25.10 CORONARY ARTERY DISEASE DUE TO CALCIFIED CORONARY LESION: ICD-10-CM

## 2024-01-01 DIAGNOSIS — I42.9 CARDIOMYOPATHY (H): ICD-10-CM

## 2024-01-01 DIAGNOSIS — J44.9 COPD, GROUP B, BY GOLD 2017 CLASSIFICATION (H): Primary | ICD-10-CM

## 2024-01-01 DIAGNOSIS — Z95.810 ICD (IMPLANTABLE CARDIOVERTER-DEFIBRILLATOR) IN PLACE: ICD-10-CM

## 2024-01-01 LAB
1,3 BETA GLUCAN SER-MCNC: 424 PG/ML
A FLAVUS AB SER QL ID: NORMAL
A FUMIGATUS1 AB SER QL ID: NORMAL
A FUMIGATUS2 AB SER QL ID: NORMAL
A FUMIGATUS3 AB SER QL ID: NORMAL
A FUMIGATUS6 AB SER QL ID: NORMAL
A PULLULANS AB SER QL ID: NORMAL
ABO/RH(D): NORMAL
ALBUMIN SERPL BCG-MCNC: 3.2 G/DL (ref 3.5–5.2)
ALBUMIN SERPL BCG-MCNC: 3.4 G/DL (ref 3.5–5.2)
ALBUMIN SERPL BCG-MCNC: 3.9 G/DL (ref 3.5–5.2)
ALBUMIN SERPL BCG-MCNC: 4.3 G/DL (ref 3.5–5.2)
ALBUMIN UR-MCNC: NEGATIVE MG/DL
ALBUMIN UR-MCNC: NEGATIVE MG/DL
ALDOLASE SERPL-CCNC: 3.6 U/L
ALLEN'S TEST: YES
ALP SERPL-CCNC: 37 U/L (ref 40–150)
ALP SERPL-CCNC: 61 U/L (ref 40–150)
ALP SERPL-CCNC: 66 U/L (ref 40–150)
ALP SERPL-CCNC: 77 U/L (ref 40–150)
ALT SERPL W P-5'-P-CCNC: 13 U/L (ref 0–70)
ALT SERPL W P-5'-P-CCNC: 13 U/L (ref 0–70)
ALT SERPL W P-5'-P-CCNC: 17 U/L (ref 0–70)
ALT SERPL W P-5'-P-CCNC: 31 U/L (ref 0–70)
ANA SER QL IF: NEGATIVE
ANA SER QL IF: NEGATIVE
ANCA AB PATTERN SER IF-IMP: NORMAL
ANION GAP SERPL CALCULATED.3IONS-SCNC: 10 MMOL/L (ref 7–15)
ANION GAP SERPL CALCULATED.3IONS-SCNC: 11 MMOL/L (ref 7–15)
ANION GAP SERPL CALCULATED.3IONS-SCNC: 11 MMOL/L (ref 7–15)
ANION GAP SERPL CALCULATED.3IONS-SCNC: 12 MMOL/L (ref 7–15)
ANION GAP SERPL CALCULATED.3IONS-SCNC: 13 MMOL/L (ref 7–15)
ANION GAP SERPL CALCULATED.3IONS-SCNC: 14 MMOL/L (ref 7–15)
ANION GAP SERPL CALCULATED.3IONS-SCNC: 14 MMOL/L (ref 7–15)
ANION GAP SERPL CALCULATED.3IONS-SCNC: 15 MMOL/L (ref 7–15)
ANION GAP SERPL CALCULATED.3IONS-SCNC: 16 MMOL/L (ref 7–15)
ANION GAP SERPL CALCULATED.3IONS-SCNC: 23 MMOL/L (ref 7–15)
ANION GAP SERPL CALCULATED.3IONS-SCNC: 7 MMOL/L (ref 7–15)
ANION GAP SERPL CALCULATED.3IONS-SCNC: 8 MMOL/L (ref 7–15)
ANION GAP SERPL CALCULATED.3IONS-SCNC: 9 MMOL/L (ref 7–15)
ANION GAP SERPL CALCULATED.3IONS-SCNC: 9 MMOL/L (ref 7–15)
ANTIBODY SCREEN: NEGATIVE
APPEARANCE UR: CLEAR
APPEARANCE UR: CLEAR
APTT PPP: 38 SECONDS (ref 22–38)
APTT PPP: 40 SECONDS (ref 22–38)
AST SERPL W P-5'-P-CCNC: 25 U/L (ref 0–45)
AST SERPL W P-5'-P-CCNC: 30 U/L (ref 0–45)
AST SERPL W P-5'-P-CCNC: 32 U/L (ref 0–45)
AST SERPL W P-5'-P-CCNC: 33 U/L (ref 0–45)
ATRIAL RATE - MUSE: 78 BPM
ATRIAL RATE - MUSE: 91 BPM
ATRIAL RATE - MUSE: NORMAL BPM
ATRIAL RATE - MUSE: NORMAL BPM
BACTERIA BLD CULT: NO GROWTH
BACTERIA SPT CULT: ABNORMAL
BASE EXCESS BLDA CALC-SCNC: -2 MMOL/L (ref -3–3)
BASE EXCESS BLDA CALC-SCNC: 10.2 MMOL/L (ref -3–3)
BASE EXCESS BLDA CALC-SCNC: 2.5 MMOL/L (ref -3–3)
BASE EXCESS BLDA CALC-SCNC: 4.1 MMOL/L (ref -3–3)
BASE EXCESS BLDA CALC-SCNC: 7.1 MMOL/L (ref -3–3)
BASE EXCESS BLDV CALC-SCNC: 1.5 MMOL/L (ref -3–3)
BASE EXCESS BLDV CALC-SCNC: 10.6 MMOL/L (ref -3–3)
BASE EXCESS BLDV CALC-SCNC: 14.9 MMOL/L (ref -3–3)
BASE EXCESS BLDV CALC-SCNC: 5.7 MMOL/L (ref -3–3)
BASE EXCESS BLDV CALC-SCNC: 6.3 MMOL/L (ref -3–3)
BASOPHILS # BLD AUTO: 0 10E3/UL (ref 0–0.2)
BASOPHILS NFR BLD AUTO: 0 %
BASOPHILS NFR BLD AUTO: 0 %
BASOPHILS NFR BLD AUTO: 1 %
BILIRUB DIRECT SERPL-MCNC: 0.61 MG/DL (ref 0–0.3)
BILIRUB SERPL-MCNC: 1.2 MG/DL
BILIRUB SERPL-MCNC: 1.2 MG/DL
BILIRUB SERPL-MCNC: 1.7 MG/DL
BILIRUB SERPL-MCNC: 1.8 MG/DL
BILIRUB UR QL STRIP: NEGATIVE
BILIRUB UR QL STRIP: NEGATIVE
BUN SERPL-MCNC: 36.4 MG/DL (ref 8–23)
BUN SERPL-MCNC: 48.2 MG/DL (ref 8–23)
BUN SERPL-MCNC: 49.9 MG/DL (ref 8–23)
BUN SERPL-MCNC: 50.1 MG/DL (ref 8–23)
BUN SERPL-MCNC: 50.3 MG/DL (ref 8–23)
BUN SERPL-MCNC: 50.5 MG/DL (ref 8–23)
BUN SERPL-MCNC: 50.8 MG/DL (ref 8–23)
BUN SERPL-MCNC: 51.1 MG/DL (ref 8–23)
BUN SERPL-MCNC: 51.6 MG/DL (ref 8–23)
BUN SERPL-MCNC: 52.1 MG/DL (ref 8–23)
BUN SERPL-MCNC: 54.8 MG/DL (ref 8–23)
BUN SERPL-MCNC: 60.8 MG/DL (ref 8–23)
BUN SERPL-MCNC: 63.5 MG/DL (ref 8–23)
BUN SERPL-MCNC: 64.7 MG/DL (ref 8–23)
BUN SERPL-MCNC: 65.3 MG/DL (ref 8–23)
BUN SERPL-MCNC: 66.5 MG/DL (ref 8–23)
BUN SERPL-MCNC: 67.5 MG/DL (ref 8–23)
BUN SERPL-MCNC: 68.5 MG/DL (ref 8–23)
BUN SERPL-MCNC: 68.6 MG/DL (ref 8–23)
BUN SERPL-MCNC: 71.1 MG/DL (ref 8–23)
BUN SERPL-MCNC: 74.6 MG/DL (ref 8–23)
BUN SERPL-MCNC: 74.7 MG/DL (ref 8–23)
BUN SERPL-MCNC: 75.3 MG/DL (ref 8–23)
BUN SERPL-MCNC: 77 MG/DL (ref 8–23)
C PNEUM DNA SPEC QL NAA+PROBE: ABNORMAL
C PNEUM DNA SPEC QL NAA+PROBE: NOT DETECTED
C PNEUM DNA SPEC QL NAA+PROBE: NOT DETECTED
C-ANCA TITR SER IF: NORMAL {TITER}
CALCIUM SERPL-MCNC: 10.2 MG/DL (ref 8.8–10.2)
CALCIUM SERPL-MCNC: 10.5 MG/DL (ref 8.8–10.2)
CALCIUM SERPL-MCNC: 8 MG/DL (ref 8.8–10.2)
CALCIUM SERPL-MCNC: 8.1 MG/DL (ref 8.8–10.2)
CALCIUM SERPL-MCNC: 8.2 MG/DL (ref 8.8–10.2)
CALCIUM SERPL-MCNC: 8.2 MG/DL (ref 8.8–10.2)
CALCIUM SERPL-MCNC: 8.3 MG/DL (ref 8.8–10.2)
CALCIUM SERPL-MCNC: 8.4 MG/DL (ref 8.8–10.2)
CALCIUM SERPL-MCNC: 8.6 MG/DL (ref 8.8–10.2)
CALCIUM SERPL-MCNC: 8.7 MG/DL (ref 8.8–10.2)
CALCIUM SERPL-MCNC: 8.7 MG/DL (ref 8.8–10.2)
CALCIUM SERPL-MCNC: 9.1 MG/DL (ref 8.8–10.2)
CALCIUM SERPL-MCNC: 9.2 MG/DL (ref 8.8–10.2)
CALCIUM SERPL-MCNC: 9.2 MG/DL (ref 8.8–10.2)
CALCIUM SERPL-MCNC: 9.4 MG/DL (ref 8.8–10.2)
CALCIUM SERPL-MCNC: 9.4 MG/DL (ref 8.8–10.2)
CALCIUM SERPL-MCNC: 9.5 MG/DL (ref 8.8–10.2)
CALCIUM SERPL-MCNC: 9.7 MG/DL (ref 8.8–10.2)
CALCIUM SERPL-MCNC: 9.7 MG/DL (ref 8.8–10.2)
CALCIUM SERPL-MCNC: 9.8 MG/DL (ref 8.8–10.2)
CALCIUM SERPL-MCNC: 9.8 MG/DL (ref 8.8–10.2)
CALCIUM SERPL-MCNC: 9.9 MG/DL (ref 8.8–10.2)
CALCIUM SERPL-MCNC: 9.9 MG/DL (ref 8.8–10.2)
CANDIDA AURIS CONFIRMATION PCR: NEGATIVE
CCP AB SER IA-ACNC: 1.9 U/ML
CENTROMERE B AB SER-ACNC: <0.7 U/ML
CENTROMERE B AB SER-ACNC: NEGATIVE
CHLORIDE SERPL-SCNC: 100 MMOL/L (ref 98–107)
CHLORIDE SERPL-SCNC: 102 MMOL/L (ref 98–107)
CHLORIDE SERPL-SCNC: 103 MMOL/L (ref 98–107)
CHLORIDE SERPL-SCNC: 104 MMOL/L (ref 98–107)
CHLORIDE SERPL-SCNC: 104 MMOL/L (ref 98–107)
CHLORIDE SERPL-SCNC: 105 MMOL/L (ref 98–107)
CHLORIDE SERPL-SCNC: 106 MMOL/L (ref 98–107)
CHLORIDE SERPL-SCNC: 106 MMOL/L (ref 98–107)
CHLORIDE SERPL-SCNC: 107 MMOL/L (ref 98–107)
CHLORIDE SERPL-SCNC: 108 MMOL/L (ref 98–107)
CHLORIDE SERPL-SCNC: 109 MMOL/L (ref 98–107)
CHLORIDE SERPL-SCNC: 86 MMOL/L (ref 98–107)
CHLORIDE SERPL-SCNC: 88 MMOL/L (ref 98–107)
CHLORIDE SERPL-SCNC: 89 MMOL/L (ref 98–107)
CHLORIDE SERPL-SCNC: 93 MMOL/L (ref 98–107)
CHLORIDE SERPL-SCNC: 94 MMOL/L (ref 98–107)
CHLORIDE SERPL-SCNC: 95 MMOL/L (ref 98–107)
CHLORIDE SERPL-SCNC: 97 MMOL/L (ref 98–107)
CHLORIDE SERPL-SCNC: 98 MMOL/L (ref 98–107)
CHLORIDE SERPL-SCNC: 99 MMOL/L (ref 98–107)
CK SERPL-CCNC: 67 U/L (ref 39–308)
CK SERPL-CCNC: 76 U/L (ref 39–308)
COHGB MFR BLD: 90.1 % (ref 95–96)
COHGB MFR BLD: 93 % (ref 95–96)
COHGB MFR BLD: 97.5 % (ref 95–96)
COHGB MFR BLD: 98.3 % (ref 95–96)
COHGB MFR BLD: >100 % (ref 95–96)
COLOR UR AUTO: ABNORMAL
COLOR UR AUTO: ABNORMAL
CREAT SERPL-MCNC: 1.65 MG/DL (ref 0.67–1.17)
CREAT SERPL-MCNC: 1.66 MG/DL (ref 0.67–1.17)
CREAT SERPL-MCNC: 1.73 MG/DL (ref 0.67–1.17)
CREAT SERPL-MCNC: 1.78 MG/DL (ref 0.67–1.17)
CREAT SERPL-MCNC: 1.78 MG/DL (ref 0.67–1.17)
CREAT SERPL-MCNC: 1.83 MG/DL (ref 0.67–1.17)
CREAT SERPL-MCNC: 1.84 MG/DL (ref 0.67–1.17)
CREAT SERPL-MCNC: 1.88 MG/DL (ref 0.67–1.17)
CREAT SERPL-MCNC: 1.92 MG/DL (ref 0.67–1.17)
CREAT SERPL-MCNC: 1.92 MG/DL (ref 0.67–1.17)
CREAT SERPL-MCNC: 1.95 MG/DL (ref 0.67–1.17)
CREAT SERPL-MCNC: 1.97 MG/DL (ref 0.67–1.17)
CREAT SERPL-MCNC: 2.04 MG/DL (ref 0.67–1.17)
CREAT SERPL-MCNC: 2.06 MG/DL (ref 0.67–1.17)
CREAT SERPL-MCNC: 2.14 MG/DL (ref 0.67–1.17)
CREAT SERPL-MCNC: 2.24 MG/DL (ref 0.67–1.17)
CREAT SERPL-MCNC: 2.27 MG/DL (ref 0.67–1.17)
CREAT SERPL-MCNC: 2.36 MG/DL (ref 0.67–1.17)
CREAT SERPL-MCNC: 2.39 MG/DL (ref 0.67–1.17)
CREAT SERPL-MCNC: 2.48 MG/DL (ref 0.67–1.17)
CREAT SERPL-MCNC: 2.52 MG/DL (ref 0.67–1.17)
CREAT SERPL-MCNC: 2.7 MG/DL (ref 0.67–1.17)
CREAT SERPL-MCNC: 3.03 MG/DL (ref 0.67–1.17)
CREAT SERPL-MCNC: 3.05 MG/DL (ref 0.67–1.17)
CRP SERPL-MCNC: 19.3 MG/L
CRP SERPL-MCNC: 4.01 MG/L
CRP SERPL-MCNC: 8.47 MG/L
CRP SERPL-MCNC: 88.4 MG/L
CRP SERPL-MCNC: <3 MG/L
CRP SERPL-MCNC: <3 MG/L
CYSTATIN C (ROCHE): 2.9 MG/L (ref 0.6–1)
D DIMER PPP FEU-MCNC: 0.38 UG/ML FEU (ref 0–0.5)
DEPRECATED HCO3 PLAS-SCNC: 21 MMOL/L (ref 22–29)
DEPRECATED HCO3 PLAS-SCNC: 22 MMOL/L (ref 22–29)
DEPRECATED HCO3 PLAS-SCNC: 23 MMOL/L (ref 22–29)
DEPRECATED HCO3 PLAS-SCNC: 24 MMOL/L (ref 22–29)
DEPRECATED HCO3 PLAS-SCNC: 25 MMOL/L (ref 22–29)
DEPRECATED HCO3 PLAS-SCNC: 26 MMOL/L (ref 22–29)
DEPRECATED HCO3 PLAS-SCNC: 26 MMOL/L (ref 22–29)
DEPRECATED HCO3 PLAS-SCNC: 27 MMOL/L (ref 22–29)
DEPRECATED HCO3 PLAS-SCNC: 28 MMOL/L (ref 22–29)
DEPRECATED HCO3 PLAS-SCNC: 31 MMOL/L (ref 22–29)
DEPRECATED HCO3 PLAS-SCNC: 33 MMOL/L (ref 22–29)
DEPRECATED HCO3 PLAS-SCNC: 33 MMOL/L (ref 22–29)
DEPRECATED HCO3 PLAS-SCNC: 38 MMOL/L (ref 22–29)
DEPRECATED HCO3 PLAS-SCNC: 40 MMOL/L (ref 22–29)
DEPRECATED HCO3 PLAS-SCNC: 40 MMOL/L (ref 22–29)
DIASTOLIC BLOOD PRESSURE - MUSE: 77 MMHG
DIASTOLIC BLOOD PRESSURE - MUSE: NORMAL MMHG
EGFRCR SERPLBLD CKD-EPI 2021: 20 ML/MIN/1.73M2
EGFRCR SERPLBLD CKD-EPI 2021: 20 ML/MIN/1.73M2
EGFRCR SERPLBLD CKD-EPI 2021: 23 ML/MIN/1.73M2
EGFRCR SERPLBLD CKD-EPI 2021: 25 ML/MIN/1.73M2
EGFRCR SERPLBLD CKD-EPI 2021: 26 ML/MIN/1.73M2
EGFRCR SERPLBLD CKD-EPI 2021: 27 ML/MIN/1.73M2
EGFRCR SERPLBLD CKD-EPI 2021: 27 ML/MIN/1.73M2
EGFRCR SERPLBLD CKD-EPI 2021: 29 ML/MIN/1.73M2
EGFRCR SERPLBLD CKD-EPI 2021: 29 ML/MIN/1.73M2
EGFRCR SERPLBLD CKD-EPI 2021: 31 ML/MIN/1.73M2
EGFRCR SERPLBLD CKD-EPI 2021: 32 ML/MIN/1.73M2
EGFRCR SERPLBLD CKD-EPI 2021: 33 ML/MIN/1.73M2
EGFRCR SERPLBLD CKD-EPI 2021: 34 ML/MIN/1.73M2
EGFRCR SERPLBLD CKD-EPI 2021: 34 ML/MIN/1.73M2
EGFRCR SERPLBLD CKD-EPI 2021: 35 ML/MIN/1.73M2
EGFRCR SERPLBLD CKD-EPI 2021: 35 ML/MIN/1.73M2
EGFRCR SERPLBLD CKD-EPI 2021: 36 ML/MIN/1.73M2
EGFRCR SERPLBLD CKD-EPI 2021: 37 ML/MIN/1.73M2
EGFRCR SERPLBLD CKD-EPI 2021: 37 ML/MIN/1.73M2
EGFRCR SERPLBLD CKD-EPI 2021: 38 ML/MIN/1.73M2
EGFRCR SERPLBLD CKD-EPI 2021: 38 ML/MIN/1.73M2
EGFRCR SERPLBLD CKD-EPI 2021: 40 ML/MIN/1.73M2
EGFRCR SERPLBLD CKD-EPI 2021: 42 ML/MIN/1.73M2
EGFRCR SERPLBLD CKD-EPI 2021: 42 ML/MIN/1.73M2
ENA JO1 AB SER IA-ACNC: <0.5 U/ML
ENA JO1 IGG SER-ACNC: NEGATIVE
ENA SCL70 IGG SER IA-ACNC: <0.6 U/ML
ENA SCL70 IGG SER IA-ACNC: NEGATIVE
ENA SS-A AB SER IA-ACNC: 0.5 U/ML
ENA SS-A AB SER IA-ACNC: NEGATIVE
ENA SS-B IGG SER IA-ACNC: <0.6 U/ML
ENA SS-B IGG SER IA-ACNC: NEGATIVE
EOSINOPHIL # BLD AUTO: 0 10E3/UL (ref 0–0.7)
EOSINOPHIL # BLD AUTO: 0 10E3/UL (ref 0–0.7)
EOSINOPHIL # BLD AUTO: 0.2 10E3/UL (ref 0–0.7)
EOSINOPHIL NFR BLD AUTO: 0 %
EOSINOPHIL NFR BLD AUTO: 0 %
EOSINOPHIL NFR BLD AUTO: 2 %
ERYTHROCYTE [DISTWIDTH] IN BLOOD BY AUTOMATED COUNT: 15.5 % (ref 10–15)
ERYTHROCYTE [DISTWIDTH] IN BLOOD BY AUTOMATED COUNT: 15.8 % (ref 10–15)
ERYTHROCYTE [DISTWIDTH] IN BLOOD BY AUTOMATED COUNT: 15.8 % (ref 10–15)
ERYTHROCYTE [DISTWIDTH] IN BLOOD BY AUTOMATED COUNT: 15.9 % (ref 10–15)
ERYTHROCYTE [DISTWIDTH] IN BLOOD BY AUTOMATED COUNT: 16 % (ref 10–15)
ERYTHROCYTE [DISTWIDTH] IN BLOOD BY AUTOMATED COUNT: 16.2 % (ref 10–15)
ERYTHROCYTE [DISTWIDTH] IN BLOOD BY AUTOMATED COUNT: 16.3 % (ref 10–15)
ERYTHROCYTE [DISTWIDTH] IN BLOOD BY AUTOMATED COUNT: 16.6 % (ref 10–15)
ERYTHROCYTE [DISTWIDTH] IN BLOOD BY AUTOMATED COUNT: 16.9 % (ref 10–15)
ERYTHROCYTE [DISTWIDTH] IN BLOOD BY AUTOMATED COUNT: 17.2 % (ref 10–15)
ERYTHROCYTE [DISTWIDTH] IN BLOOD BY AUTOMATED COUNT: 17.9 % (ref 10–15)
ERYTHROCYTE [DISTWIDTH] IN BLOOD BY AUTOMATED COUNT: 17.9 % (ref 10–15)
ERYTHROCYTE [DISTWIDTH] IN BLOOD BY AUTOMATED COUNT: 18.3 % (ref 10–15)
ERYTHROCYTE [DISTWIDTH] IN BLOOD BY AUTOMATED COUNT: 18.6 % (ref 10–15)
ERYTHROCYTE [DISTWIDTH] IN BLOOD BY AUTOMATED COUNT: 19 % (ref 10–15)
ERYTHROCYTE [SEDIMENTATION RATE] IN BLOOD BY WESTERGREN METHOD: 18 MM/HR (ref 0–20)
ERYTHROCYTE [SEDIMENTATION RATE] IN BLOOD BY WESTERGREN METHOD: 56 MM/HR (ref 0–20)
FLUAV H1 2009 PAND RNA SPEC QL NAA+PROBE: ABNORMAL
FLUAV H1 2009 PAND RNA SPEC QL NAA+PROBE: NOT DETECTED
FLUAV H1 2009 PAND RNA SPEC QL NAA+PROBE: NOT DETECTED
FLUAV H1 RNA SPEC QL NAA+PROBE: ABNORMAL
FLUAV H1 RNA SPEC QL NAA+PROBE: NOT DETECTED
FLUAV H1 RNA SPEC QL NAA+PROBE: NOT DETECTED
FLUAV H3 RNA SPEC QL NAA+PROBE: ABNORMAL
FLUAV H3 RNA SPEC QL NAA+PROBE: NOT DETECTED
FLUAV H3 RNA SPEC QL NAA+PROBE: NOT DETECTED
FLUAV RNA SPEC QL NAA+PROBE: ABNORMAL
FLUAV RNA SPEC QL NAA+PROBE: NEGATIVE
FLUAV RNA SPEC QL NAA+PROBE: NEGATIVE
FLUAV RNA SPEC QL NAA+PROBE: NOT DETECTED
FLUAV RNA SPEC QL NAA+PROBE: NOT DETECTED
FLUBV RNA RESP QL NAA+PROBE: NEGATIVE
FLUBV RNA RESP QL NAA+PROBE: NEGATIVE
FLUBV RNA SPEC QL NAA+PROBE: ABNORMAL
FLUBV RNA SPEC QL NAA+PROBE: NOT DETECTED
FLUBV RNA SPEC QL NAA+PROBE: NOT DETECTED
GALACTOMANNAN AG SERPL QL IA: NEGATIVE
GALACTOMANNAN AG SPEC IA-ACNC: 0.16
GAMMA INTERFERON BACKGROUND BLD IA-ACNC: 0 IU/ML
GFR SERPL CREATININE-BSD FRML MDRD: 18 ML/MIN/1.73M2
GLUCOSE BLDC GLUCOMTR-MCNC: 155 MG/DL (ref 70–99)
GLUCOSE BLDC GLUCOMTR-MCNC: 168 MG/DL (ref 70–99)
GLUCOSE BLDC GLUCOMTR-MCNC: 176 MG/DL (ref 70–99)
GLUCOSE SERPL-MCNC: 107 MG/DL (ref 70–99)
GLUCOSE SERPL-MCNC: 112 MG/DL (ref 70–99)
GLUCOSE SERPL-MCNC: 114 MG/DL (ref 70–99)
GLUCOSE SERPL-MCNC: 116 MG/DL (ref 70–99)
GLUCOSE SERPL-MCNC: 117 MG/DL (ref 70–99)
GLUCOSE SERPL-MCNC: 119 MG/DL (ref 70–99)
GLUCOSE SERPL-MCNC: 123 MG/DL (ref 70–99)
GLUCOSE SERPL-MCNC: 126 MG/DL (ref 70–99)
GLUCOSE SERPL-MCNC: 131 MG/DL (ref 70–99)
GLUCOSE SERPL-MCNC: 135 MG/DL (ref 70–99)
GLUCOSE SERPL-MCNC: 137 MG/DL (ref 70–99)
GLUCOSE SERPL-MCNC: 140 MG/DL (ref 70–99)
GLUCOSE SERPL-MCNC: 140 MG/DL (ref 70–99)
GLUCOSE SERPL-MCNC: 141 MG/DL (ref 70–99)
GLUCOSE SERPL-MCNC: 144 MG/DL (ref 70–99)
GLUCOSE SERPL-MCNC: 149 MG/DL (ref 70–99)
GLUCOSE SERPL-MCNC: 152 MG/DL (ref 70–99)
GLUCOSE SERPL-MCNC: 160 MG/DL (ref 70–99)
GLUCOSE SERPL-MCNC: 164 MG/DL (ref 70–99)
GLUCOSE SERPL-MCNC: 167 MG/DL (ref 70–99)
GLUCOSE SERPL-MCNC: 167 MG/DL (ref 70–99)
GLUCOSE SERPL-MCNC: 169 MG/DL (ref 70–99)
GLUCOSE SERPL-MCNC: 170 MG/DL (ref 70–99)
GLUCOSE SERPL-MCNC: 170 MG/DL (ref 70–99)
GLUCOSE SERPL-MCNC: 175 MG/DL (ref 70–99)
GLUCOSE SERPL-MCNC: 178 MG/DL (ref 70–99)
GLUCOSE UR STRIP-MCNC: >=1000 MG/DL
GLUCOSE UR STRIP-MCNC: NEGATIVE MG/DL
GRAM STAIN RESULT: ABNORMAL
HADV DNA SPEC QL NAA+PROBE: ABNORMAL
HADV DNA SPEC QL NAA+PROBE: NOT DETECTED
HADV DNA SPEC QL NAA+PROBE: NOT DETECTED
HBA1C MFR BLD: 6.9 %
HBV CORE AB SERPL QL IA: NONREACTIVE
HBV E AG SERPL QL IA: NEGATIVE
HBV SURFACE AB SERPL IA-ACNC: <3.5 M[IU]/ML
HBV SURFACE AB SERPL IA-ACNC: NONREACTIVE M[IU]/ML
HCO3 BLD-SCNC: 22 MMOL/L (ref 21–28)
HCO3 BLD-SCNC: 27 MMOL/L (ref 21–28)
HCO3 BLD-SCNC: 28 MMOL/L (ref 21–28)
HCO3 BLD-SCNC: 31 MMOL/L (ref 21–28)
HCO3 BLD-SCNC: 33 MMOL/L (ref 21–28)
HCO3 BLDV-SCNC: 26 MMOL/L (ref 21–28)
HCO3 BLDV-SCNC: 30 MMOL/L (ref 21–28)
HCO3 BLDV-SCNC: 32 MMOL/L (ref 21–28)
HCO3 BLDV-SCNC: 34 MMOL/L (ref 21–28)
HCO3 BLDV-SCNC: 39 MMOL/L (ref 21–28)
HCOV PNL SPEC NAA+PROBE: ABNORMAL
HCOV PNL SPEC NAA+PROBE: NOT DETECTED
HCOV PNL SPEC NAA+PROBE: NOT DETECTED
HCT VFR BLD AUTO: 26.1 % (ref 40–53)
HCT VFR BLD AUTO: 26.2 % (ref 40–53)
HCT VFR BLD AUTO: 26.8 % (ref 40–53)
HCT VFR BLD AUTO: 26.9 % (ref 40–53)
HCT VFR BLD AUTO: 27.2 % (ref 40–53)
HCT VFR BLD AUTO: 28.3 % (ref 40–53)
HCT VFR BLD AUTO: 28.5 % (ref 40–53)
HCT VFR BLD AUTO: 29.2 % (ref 40–53)
HCT VFR BLD AUTO: 29.4 % (ref 40–53)
HCT VFR BLD AUTO: 29.4 % (ref 40–53)
HCT VFR BLD AUTO: 29.9 % (ref 40–53)
HCT VFR BLD AUTO: 30.5 % (ref 40–53)
HCT VFR BLD AUTO: 31.9 % (ref 40–53)
HCT VFR BLD AUTO: 32.1 % (ref 40–53)
HCT VFR BLD AUTO: 32.2 % (ref 40–53)
HCT VFR BLD AUTO: 33.1 % (ref 40–53)
HCT VFR BLD AUTO: 36.2 % (ref 40–53)
HCT VFR BLD AUTO: 39.4 % (ref 40–53)
HCT VFR BLD AUTO: 42.3 % (ref 40–53)
HCV AB SERPL QL IA: NONREACTIVE
HGB BLD-MCNC: 10.2 G/DL (ref 13.3–17.7)
HGB BLD-MCNC: 10.4 G/DL (ref 13.3–17.7)
HGB BLD-MCNC: 10.5 G/DL (ref 13.3–17.7)
HGB BLD-MCNC: 10.6 G/DL (ref 13.3–17.7)
HGB BLD-MCNC: 11 G/DL (ref 13.3–17.7)
HGB BLD-MCNC: 11.8 G/DL (ref 13.3–17.7)
HGB BLD-MCNC: 12.9 G/DL (ref 13.3–17.7)
HGB BLD-MCNC: 13.2 G/DL (ref 13.3–17.7)
HGB BLD-MCNC: 8 G/DL (ref 13.3–17.7)
HGB BLD-MCNC: 8.1 G/DL (ref 13.3–17.7)
HGB BLD-MCNC: 8.3 G/DL (ref 13.3–17.7)
HGB BLD-MCNC: 8.3 G/DL (ref 13.3–17.7)
HGB BLD-MCNC: 8.4 G/DL (ref 13.3–17.7)
HGB BLD-MCNC: 8.9 G/DL (ref 13.3–17.7)
HGB BLD-MCNC: 9.2 G/DL (ref 13.3–17.7)
HGB BLD-MCNC: 9.3 G/DL (ref 13.3–17.7)
HGB BLD-MCNC: 9.3 G/DL (ref 13.3–17.7)
HGB BLD-MCNC: 9.5 G/DL (ref 13.3–17.7)
HGB BLD-MCNC: 9.7 G/DL (ref 13.3–17.7)
HGB BLD-MCNC: 9.8 G/DL (ref 13.3–17.7)
HGB UR QL STRIP: NEGATIVE
HGB UR QL STRIP: NEGATIVE
HIV 1+2 AB+HIV1 P24 AG SERPL QL IA: NONREACTIVE
HMPV RNA SPEC QL NAA+PROBE: ABNORMAL
HMPV RNA SPEC QL NAA+PROBE: NOT DETECTED
HMPV RNA SPEC QL NAA+PROBE: NOT DETECTED
HOLD SPECIMEN: NORMAL
HPIV1 RNA SPEC QL NAA+PROBE: ABNORMAL
HPIV1 RNA SPEC QL NAA+PROBE: NOT DETECTED
HPIV1 RNA SPEC QL NAA+PROBE: NOT DETECTED
HPIV2 RNA SPEC QL NAA+PROBE: ABNORMAL
HPIV2 RNA SPEC QL NAA+PROBE: NOT DETECTED
HPIV2 RNA SPEC QL NAA+PROBE: NOT DETECTED
HPIV3 RNA SPEC QL NAA+PROBE: ABNORMAL
HPIV3 RNA SPEC QL NAA+PROBE: NOT DETECTED
HPIV3 RNA SPEC QL NAA+PROBE: NOT DETECTED
HPIV4 RNA SPEC QL NAA+PROBE: ABNORMAL
HPIV4 RNA SPEC QL NAA+PROBE: NOT DETECTED
HPIV4 RNA SPEC QL NAA+PROBE: NOT DETECTED
HYALINE CASTS: 22 /LPF
IGG SERPL-MCNC: 904 MG/DL (ref 610–1616)
IGG1 SER-MCNC: 640 MG/DL (ref 382–929)
IGG2 SER-MCNC: 165 MG/DL (ref 242–700)
IGG3 SER-MCNC: 47 MG/DL (ref 22–176)
IGG4 SER-MCNC: 28 MG/DL (ref 4–86)
IL6 SERPL-MCNC: 827.59 PG/ML
IMM GRANULOCYTES # BLD: 0 10E3/UL
IMM GRANULOCYTES # BLD: 0 10E3/UL
IMM GRANULOCYTES # BLD: 0.1 10E3/UL
IMM GRANULOCYTES NFR BLD: 0 %
IMM GRANULOCYTES NFR BLD: 0 %
IMM GRANULOCYTES NFR BLD: 1 %
INR HOME MONITORING: 2.5 (ref 2–3)
INR HOME MONITORING: 2.5 (ref 2–3)
INR HOME MONITORING: 2.7 (ref 2–3)
INR HOME MONITORING: 2.7 (ref 2–3)
INR HOME MONITORING: 2.8 (ref 2–3)
INR HOME MONITORING: 2.8 (ref 2–3)
INR HOME MONITORING: 2.9 (ref 2–3)
INR HOME MONITORING: 3 (ref 2–3)
INR HOME MONITORING: 4.7 (ref 2–3)
INR PPP: 1.79 (ref 0.85–1.15)
INR PPP: 1.82 (ref 0.85–1.15)
INR PPP: 1.91 (ref 0.85–1.15)
INR PPP: 1.95 (ref 0.85–1.15)
INR PPP: 2.1 (ref 0.85–1.15)
INR PPP: 2.29 (ref 0.85–1.15)
INR PPP: 2.32 (ref 0.85–1.15)
INR PPP: 2.34 (ref 0.85–1.15)
INR PPP: 2.45 (ref 0.85–1.15)
INR PPP: 2.58 (ref 0.85–1.15)
INR PPP: 2.68 (ref 0.85–1.15)
INR PPP: 2.72 (ref 0.85–1.15)
INR PPP: 2.76 (ref 0.85–1.15)
INR PPP: 2.82 (ref 0.85–1.15)
INR PPP: 2.84 (ref 0.85–1.15)
INR PPP: 2.93 (ref 0.85–1.15)
INR PPP: 2.94 (ref 0.85–1.15)
INR PPP: 2.96 (ref 0.85–1.15)
INR PPP: 3.05 (ref 0.85–1.15)
INR PPP: 3.06 (ref 0.85–1.15)
INR PPP: 3.08 (ref 0.85–1.15)
INR PPP: 3.22 (ref 0.85–1.15)
INR PPP: 3.25 (ref 0.85–1.15)
INTERPRETATION ECG - MUSE: NORMAL
IRON BINDING CAPACITY (ROCHE): 301 UG/DL (ref 240–430)
IRON SATN MFR SERPL: 21 % (ref 15–46)
IRON SERPL-MCNC: 63 UG/DL (ref 61–157)
KETONES UR STRIP-MCNC: NEGATIVE MG/DL
KETONES UR STRIP-MCNC: NEGATIVE MG/DL
LACTATE SERPL-SCNC: 1.7 MMOL/L (ref 0.7–2)
LACTATE SERPL-SCNC: 1.8 MMOL/L (ref 0.7–2)
LACTATE SERPL-SCNC: 1.9 MMOL/L (ref 0.7–2)
LACTATE SERPL-SCNC: 2 MMOL/L (ref 0.7–2)
LACTATE SERPL-SCNC: 2.1 MMOL/L (ref 0.7–2)
LACTATE SERPL-SCNC: 2.2 MMOL/L (ref 0.7–2)
LACTATE SERPL-SCNC: 2.3 MMOL/L (ref 0.7–2)
LACTATE SERPL-SCNC: 2.4 MMOL/L (ref 0.7–2)
LACTATE SERPL-SCNC: 2.4 MMOL/L (ref 0.7–2)
LACTATE SERPL-SCNC: 2.5 MMOL/L (ref 0.7–2)
LACTATE SERPL-SCNC: 2.8 MMOL/L (ref 0.7–2)
LACTATE SERPL-SCNC: 3.2 MMOL/L (ref 0.7–2)
LACTATE SERPL-SCNC: 3.7 MMOL/L (ref 0.7–2)
LACTATE SERPL-SCNC: 3.8 MMOL/L (ref 0.7–2)
LACTATE SERPL-SCNC: 4.8 MMOL/L (ref 0.7–2)
LACTATE SERPL-SCNC: 4.8 MMOL/L (ref 0.7–2)
LACTATE SERPL-SCNC: 5.2 MMOL/L (ref 0.7–2)
LDH SERPL L TO P-CCNC: 558 U/L (ref 0–250)
LEUKOCYTE ESTERASE UR QL STRIP: NEGATIVE
LEUKOCYTE ESTERASE UR QL STRIP: NEGATIVE
LVEF ECHO: NORMAL
LVEF ECHO: NORMAL
LYMPHOCYTES # BLD AUTO: 0.8 10E3/UL (ref 0.8–5.3)
LYMPHOCYTES # BLD AUTO: 0.9 10E3/UL (ref 0.8–5.3)
LYMPHOCYTES # BLD AUTO: 1.9 10E3/UL (ref 0.8–5.3)
LYMPHOCYTES NFR BLD AUTO: 23 %
LYMPHOCYTES NFR BLD AUTO: 5 %
LYMPHOCYTES NFR BLD AUTO: 6 %
M PNEUMO DNA SPEC QL NAA+PROBE: ABNORMAL
M PNEUMO DNA SPEC QL NAA+PROBE: NOT DETECTED
M PNEUMO DNA SPEC QL NAA+PROBE: NOT DETECTED
M TB IFN-G BLD-IMP: NEGATIVE
M TB IFN-G CD4+ BCKGRND COR BLD-ACNC: 10 IU/ML
MAGNESIUM SERPL-MCNC: 2.1 MG/DL (ref 1.7–2.3)
MAGNESIUM SERPL-MCNC: 2.2 MG/DL (ref 1.7–2.3)
MAGNESIUM SERPL-MCNC: 2.3 MG/DL (ref 1.7–2.3)
MAGNESIUM SERPL-MCNC: 2.4 MG/DL (ref 1.7–2.3)
MAGNESIUM SERPL-MCNC: 2.4 MG/DL (ref 1.7–2.3)
MAGNESIUM SERPL-MCNC: 2.5 MG/DL (ref 1.7–2.3)
MCH RBC QN AUTO: 31.1 PG (ref 26.5–33)
MCH RBC QN AUTO: 31.1 PG (ref 26.5–33)
MCH RBC QN AUTO: 31.6 PG (ref 26.5–33)
MCH RBC QN AUTO: 31.7 PG (ref 26.5–33)
MCH RBC QN AUTO: 31.7 PG (ref 26.5–33)
MCH RBC QN AUTO: 31.8 PG (ref 26.5–33)
MCH RBC QN AUTO: 31.9 PG (ref 26.5–33)
MCH RBC QN AUTO: 31.9 PG (ref 26.5–33)
MCH RBC QN AUTO: 32 PG (ref 26.5–33)
MCH RBC QN AUTO: 32.2 PG (ref 26.5–33)
MCH RBC QN AUTO: 32.3 PG (ref 26.5–33)
MCH RBC QN AUTO: 32.4 PG (ref 26.5–33)
MCH RBC QN AUTO: 32.5 PG (ref 26.5–33)
MCH RBC QN AUTO: 32.7 PG (ref 26.5–33)
MCH RBC QN AUTO: 32.8 PG (ref 26.5–33)
MCH RBC QN AUTO: 32.8 PG (ref 26.5–33)
MCH RBC QN AUTO: 33 PG (ref 26.5–33)
MCHC RBC AUTO-ENTMCNC: 30.5 G/DL (ref 31.5–36.5)
MCHC RBC AUTO-ENTMCNC: 30.9 G/DL (ref 31.5–36.5)
MCHC RBC AUTO-ENTMCNC: 30.9 G/DL (ref 31.5–36.5)
MCHC RBC AUTO-ENTMCNC: 31 G/DL (ref 31.5–36.5)
MCHC RBC AUTO-ENTMCNC: 31 G/DL (ref 31.5–36.5)
MCHC RBC AUTO-ENTMCNC: 31.2 G/DL (ref 31.5–36.5)
MCHC RBC AUTO-ENTMCNC: 31.4 G/DL (ref 31.5–36.5)
MCHC RBC AUTO-ENTMCNC: 31.6 G/DL (ref 31.5–36.5)
MCHC RBC AUTO-ENTMCNC: 31.7 G/DL (ref 31.5–36.5)
MCHC RBC AUTO-ENTMCNC: 31.8 G/DL (ref 31.5–36.5)
MCHC RBC AUTO-ENTMCNC: 32 G/DL (ref 31.5–36.5)
MCHC RBC AUTO-ENTMCNC: 32.1 G/DL (ref 31.5–36.5)
MCHC RBC AUTO-ENTMCNC: 32.3 G/DL (ref 31.5–36.5)
MCHC RBC AUTO-ENTMCNC: 32.3 G/DL (ref 31.5–36.5)
MCHC RBC AUTO-ENTMCNC: 32.4 G/DL (ref 31.5–36.5)
MCHC RBC AUTO-ENTMCNC: 32.6 G/DL (ref 31.5–36.5)
MCHC RBC AUTO-ENTMCNC: 32.6 G/DL (ref 31.5–36.5)
MCHC RBC AUTO-ENTMCNC: 32.7 G/DL (ref 31.5–36.5)
MCHC RBC AUTO-ENTMCNC: 32.7 G/DL (ref 31.5–36.5)
MCV RBC AUTO: 100 FL (ref 78–100)
MCV RBC AUTO: 101 FL (ref 78–100)
MCV RBC AUTO: 103 FL (ref 78–100)
MCV RBC AUTO: 104 FL (ref 78–100)
MCV RBC AUTO: 98 FL (ref 78–100)
MCV RBC AUTO: 98 FL (ref 78–100)
MCV RBC AUTO: 99 FL (ref 78–100)
MDC_IDC_LEAD_CONNECTION_STATUS: NORMAL
MDC_IDC_LEAD_CONNECTION_STATUS: NORMAL
MDC_IDC_LEAD_IMPLANT_DT: NORMAL
MDC_IDC_LEAD_IMPLANT_DT: NORMAL
MDC_IDC_LEAD_LOCATION: NORMAL
MDC_IDC_LEAD_LOCATION: NORMAL
MDC_IDC_LEAD_LOCATION_DETAIL_1: NORMAL
MDC_IDC_LEAD_LOCATION_DETAIL_1: NORMAL
MDC_IDC_LEAD_MFG: NORMAL
MDC_IDC_LEAD_MFG: NORMAL
MDC_IDC_LEAD_MODEL: NORMAL
MDC_IDC_LEAD_MODEL: NORMAL
MDC_IDC_LEAD_POLARITY_TYPE: NORMAL
MDC_IDC_LEAD_POLARITY_TYPE: NORMAL
MDC_IDC_LEAD_SERIAL: NORMAL
MDC_IDC_LEAD_SERIAL: NORMAL
MDC_IDC_MSMT_BATTERY_DTM: NORMAL
MDC_IDC_MSMT_BATTERY_REMAINING_LONGEVITY: 126 MO
MDC_IDC_MSMT_BATTERY_RRT_TRIGGER: NORMAL
MDC_IDC_MSMT_BATTERY_VOLTAGE: 3.01 V
MDC_IDC_MSMT_CAP_CHARGE_DTM: NORMAL
MDC_IDC_MSMT_CAP_CHARGE_ENERGY: 18 J
MDC_IDC_MSMT_CAP_CHARGE_TIME: 3.9 S
MDC_IDC_MSMT_CAP_CHARGE_TYPE: NORMAL
MDC_IDC_MSMT_LEADCHNL_RA_IMPEDANCE_VALUE: 380 OHM
MDC_IDC_MSMT_LEADCHNL_RA_SENSING_INTR_AMPL: 0.1 MV
MDC_IDC_MSMT_LEADCHNL_RV_IMPEDANCE_VALUE: 285 OHM
MDC_IDC_MSMT_LEADCHNL_RV_IMPEDANCE_VALUE: 380 OHM
MDC_IDC_MSMT_LEADCHNL_RV_PACING_THRESHOLD_AMPLITUDE: 0.62 V
MDC_IDC_MSMT_LEADCHNL_RV_PACING_THRESHOLD_PULSEWIDTH: 0.4 MS
MDC_IDC_MSMT_LEADCHNL_RV_SENSING_INTR_AMPL: 11.3 MV
MDC_IDC_PG_IMPLANT_DTM: NORMAL
MDC_IDC_PG_MFG: NORMAL
MDC_IDC_PG_MODEL: NORMAL
MDC_IDC_PG_SERIAL: NORMAL
MDC_IDC_PG_TYPE: NORMAL
MDC_IDC_SESS_CLINIC_NAME: NORMAL
MDC_IDC_SESS_DTM: NORMAL
MDC_IDC_SESS_TYPE: NORMAL
MDC_IDC_SET_BRADY_AT_MODE_SWITCH_RATE: 171 {BEATS}/MIN
MDC_IDC_SET_BRADY_LOWRATE: 60 {BEATS}/MIN
MDC_IDC_SET_BRADY_MAX_SENSOR_RATE: 120 {BEATS}/MIN
MDC_IDC_SET_BRADY_MODE: NORMAL
MDC_IDC_SET_LEADCHNL_RA_SENSING_ANODE_ELECTRODE_1: NORMAL
MDC_IDC_SET_LEADCHNL_RA_SENSING_ANODE_LOCATION_1: NORMAL
MDC_IDC_SET_LEADCHNL_RA_SENSING_CATHODE_ELECTRODE_1: NORMAL
MDC_IDC_SET_LEADCHNL_RA_SENSING_CATHODE_LOCATION_1: NORMAL
MDC_IDC_SET_LEADCHNL_RA_SENSING_POLARITY: NORMAL
MDC_IDC_SET_LEADCHNL_RA_SENSING_SENSITIVITY: 4 MV
MDC_IDC_SET_LEADCHNL_RV_PACING_AMPLITUDE: 1.5 V
MDC_IDC_SET_LEADCHNL_RV_PACING_ANODE_ELECTRODE_1: NORMAL
MDC_IDC_SET_LEADCHNL_RV_PACING_ANODE_LOCATION_1: NORMAL
MDC_IDC_SET_LEADCHNL_RV_PACING_CAPTURE_MODE: NORMAL
MDC_IDC_SET_LEADCHNL_RV_PACING_CATHODE_ELECTRODE_1: NORMAL
MDC_IDC_SET_LEADCHNL_RV_PACING_CATHODE_LOCATION_1: NORMAL
MDC_IDC_SET_LEADCHNL_RV_PACING_POLARITY: NORMAL
MDC_IDC_SET_LEADCHNL_RV_PACING_PULSEWIDTH: 0.4 MS
MDC_IDC_SET_LEADCHNL_RV_SENSING_ANODE_ELECTRODE_1: NORMAL
MDC_IDC_SET_LEADCHNL_RV_SENSING_ANODE_LOCATION_1: NORMAL
MDC_IDC_SET_LEADCHNL_RV_SENSING_CATHODE_ELECTRODE_1: NORMAL
MDC_IDC_SET_LEADCHNL_RV_SENSING_CATHODE_LOCATION_1: NORMAL
MDC_IDC_SET_LEADCHNL_RV_SENSING_POLARITY: NORMAL
MDC_IDC_SET_LEADCHNL_RV_SENSING_SENSITIVITY: 0.45 MV
MDC_IDC_SET_ZONE_DETECTION_BEATS_DENOMINATOR: 16 {BEATS}
MDC_IDC_SET_ZONE_DETECTION_BEATS_DENOMINATOR: 32 {BEATS}
MDC_IDC_SET_ZONE_DETECTION_BEATS_DENOMINATOR: 40 {BEATS}
MDC_IDC_SET_ZONE_DETECTION_BEATS_NUMERATOR: 16 {BEATS}
MDC_IDC_SET_ZONE_DETECTION_BEATS_NUMERATOR: 30 {BEATS}
MDC_IDC_SET_ZONE_DETECTION_BEATS_NUMERATOR: 32 {BEATS}
MDC_IDC_SET_ZONE_DETECTION_INTERVAL: 200 MS
MDC_IDC_SET_ZONE_DETECTION_INTERVAL: 240 MS
MDC_IDC_SET_ZONE_DETECTION_INTERVAL: 320 MS
MDC_IDC_SET_ZONE_DETECTION_INTERVAL: 350 MS
MDC_IDC_SET_ZONE_DETECTION_INTERVAL: 360 MS
MDC_IDC_SET_ZONE_DETECTION_INTERVAL: 360 MS
MDC_IDC_SET_ZONE_STATUS: NORMAL
MDC_IDC_SET_ZONE_TYPE: NORMAL
MDC_IDC_SET_ZONE_VENDOR_TYPE: NORMAL
MDC_IDC_STAT_AT_BURDEN_PERCENT: 0 %
MDC_IDC_STAT_AT_DTM_END: NORMAL
MDC_IDC_STAT_AT_DTM_START: NORMAL
MDC_IDC_STAT_BRADY_AP_VP_PERCENT: 0 %
MDC_IDC_STAT_BRADY_AP_VS_PERCENT: 0 %
MDC_IDC_STAT_BRADY_AS_VP_PERCENT: 24.48 %
MDC_IDC_STAT_BRADY_AS_VS_PERCENT: 75.52 %
MDC_IDC_STAT_BRADY_DTM_END: NORMAL
MDC_IDC_STAT_BRADY_DTM_START: NORMAL
MDC_IDC_STAT_BRADY_RA_PERCENT_PACED: 44.49 %
MDC_IDC_STAT_BRADY_RV_PERCENT_PACED: 24.48 %
MDC_IDC_STAT_CRT_DTM_END: NORMAL
MDC_IDC_STAT_CRT_DTM_START: NORMAL
MDC_IDC_STAT_EPISODE_RECENT_COUNT: 0
MDC_IDC_STAT_EPISODE_RECENT_COUNT_DTM_END: NORMAL
MDC_IDC_STAT_EPISODE_RECENT_COUNT_DTM_START: NORMAL
MDC_IDC_STAT_EPISODE_TOTAL_COUNT: 0
MDC_IDC_STAT_EPISODE_TOTAL_COUNT: 21
MDC_IDC_STAT_EPISODE_TOTAL_COUNT: 3496
MDC_IDC_STAT_EPISODE_TOTAL_COUNT: 4134
MDC_IDC_STAT_EPISODE_TOTAL_COUNT_DTM_END: NORMAL
MDC_IDC_STAT_EPISODE_TOTAL_COUNT_DTM_START: NORMAL
MDC_IDC_STAT_EPISODE_TYPE: NORMAL
MDC_IDC_STAT_TACHYTHERAPY_ATP_DELIVERED_RECENT: 0
MDC_IDC_STAT_TACHYTHERAPY_ATP_DELIVERED_TOTAL: 0
MDC_IDC_STAT_TACHYTHERAPY_RECENT_DTM_END: NORMAL
MDC_IDC_STAT_TACHYTHERAPY_RECENT_DTM_START: NORMAL
MDC_IDC_STAT_TACHYTHERAPY_SHOCKS_ABORTED_RECENT: 0
MDC_IDC_STAT_TACHYTHERAPY_SHOCKS_ABORTED_TOTAL: 0
MDC_IDC_STAT_TACHYTHERAPY_SHOCKS_DELIVERED_RECENT: 0
MDC_IDC_STAT_TACHYTHERAPY_SHOCKS_DELIVERED_TOTAL: 0
MDC_IDC_STAT_TACHYTHERAPY_TOTAL_DTM_END: NORMAL
MDC_IDC_STAT_TACHYTHERAPY_TOTAL_DTM_START: NORMAL
MITOGEN IGNF BCKGRD COR BLD-ACNC: 0.01 IU/ML
MITOGEN IGNF BCKGRD COR BLD-ACNC: 0.02 IU/ML
MONOCYTES # BLD AUTO: 0.7 10E3/UL (ref 0–1.3)
MONOCYTES # BLD AUTO: 0.8 10E3/UL (ref 0–1.3)
MONOCYTES # BLD AUTO: 1 10E3/UL (ref 0–1.3)
MONOCYTES NFR BLD AUTO: 12 %
MONOCYTES NFR BLD AUTO: 5 %
MONOCYTES NFR BLD AUTO: 5 %
MRSA DNA SPEC QL NAA+PROBE: NEGATIVE
MUCOUS THREADS #/AREA URNS LPF: PRESENT /LPF
MUCOUS THREADS #/AREA URNS LPF: PRESENT /LPF
NEUTROPHILS # BLD AUTO: 12.8 10E3/UL (ref 1.6–8.3)
NEUTROPHILS # BLD AUTO: 13.3 10E3/UL (ref 1.6–8.3)
NEUTROPHILS # BLD AUTO: 5.2 10E3/UL (ref 1.6–8.3)
NEUTROPHILS NFR BLD AUTO: 62 %
NEUTROPHILS NFR BLD AUTO: 88 %
NEUTROPHILS NFR BLD AUTO: 90 %
NITRATE UR QL: NEGATIVE
NITRATE UR QL: NEGATIVE
NRBC # BLD AUTO: 0 10E3/UL
NRBC # BLD AUTO: 0 10E3/UL
NRBC # BLD AUTO: 0.1 10E3/UL
NRBC BLD AUTO-RTO: 0 /100
NRBC BLD AUTO-RTO: 0 /100
NRBC BLD AUTO-RTO: 1 /100
NT-PROBNP SERPL-MCNC: 4741 PG/ML (ref 0–1800)
NT-PROBNP SERPL-MCNC: 5323 PG/ML (ref 0–1800)
NT-PROBNP SERPL-MCNC: 6204 PG/ML (ref 0–1800)
NT-PROBNP SERPL-MCNC: 6537 PG/ML (ref 0–1800)
O2/TOTAL GAS SETTING VFR VENT: 100 %
O2/TOTAL GAS SETTING VFR VENT: 30 %
O2/TOTAL GAS SETTING VFR VENT: 40 %
O2/TOTAL GAS SETTING VFR VENT: 45 %
O2/TOTAL GAS SETTING VFR VENT: 70 %
O2/TOTAL GAS SETTING VFR VENT: 80 %
O2/TOTAL GAS SETTING VFR VENT: 80 %
O2/TOTAL GAS SETTING VFR VENT: 90 %
OBSERVATION IMP: POSITIVE
OXYHGB MFR BLDV: 24 % (ref 70–75)
OXYHGB MFR BLDV: 33 % (ref 70–75)
OXYHGB MFR BLDV: 46 % (ref 70–75)
OXYHGB MFR BLDV: 77 % (ref 70–75)
OXYHGB MFR BLDV: 84 % (ref 70–75)
P AXIS - MUSE: NORMAL DEGREES
PCO2 BLD: 33 MM HG (ref 35–45)
PCO2 BLD: 38 MM HG (ref 35–45)
PCO2 BLD: 39 MM HG (ref 35–45)
PCO2 BLD: 39 MM HG (ref 35–45)
PCO2 BLD: 42 MM HG (ref 35–45)
PCO2 BLDV: 39 MM HG (ref 40–50)
PCO2 BLDV: 42 MM HG (ref 40–50)
PCO2 BLDV: 42 MM HG (ref 40–50)
PCO2 BLDV: 49 MM HG (ref 40–50)
PCO2 BLDV: 53 MM HG (ref 40–50)
PEEP: 5 CM H2O
PH BLD: 7.43 [PH] (ref 7.35–7.45)
PH BLD: 7.44 [PH] (ref 7.35–7.45)
PH BLD: 7.46 [PH] (ref 7.35–7.45)
PH BLD: 7.51 [PH] (ref 7.35–7.45)
PH BLD: 7.51 [PH] (ref 7.35–7.45)
PH BLDV: 7.42 [PH] (ref 7.32–7.43)
PH BLDV: 7.43 [PH] (ref 7.32–7.43)
PH BLDV: 7.46 [PH] (ref 7.32–7.43)
PH BLDV: 7.47 [PH] (ref 7.32–7.43)
PH BLDV: 7.51 [PH] (ref 7.32–7.43)
PH UR STRIP: 5 [PH] (ref 5–7)
PH UR STRIP: 6.5 [PH] (ref 5–7)
PHOSPHATE SERPL-MCNC: 2.4 MG/DL (ref 2.5–4.5)
PHOSPHATE SERPL-MCNC: 2.4 MG/DL (ref 2.5–4.5)
PHOSPHATE SERPL-MCNC: 3.2 MG/DL (ref 2.5–4.5)
PHOSPHATE SERPL-MCNC: 3.2 MG/DL (ref 2.5–4.5)
PHOSPHATE SERPL-MCNC: 3.3 MG/DL (ref 2.5–4.5)
PHOSPHATE SERPL-MCNC: 3.4 MG/DL (ref 2.5–4.5)
PHOSPHATE SERPL-MCNC: 3.4 MG/DL (ref 2.5–4.5)
PHOSPHATE SERPL-MCNC: 3.7 MG/DL (ref 2.5–4.5)
PHOSPHATE SERPL-MCNC: 3.7 MG/DL (ref 2.5–4.5)
PHOSPHATE SERPL-MCNC: 3.8 MG/DL (ref 2.5–4.5)
PHOSPHATE SERPL-MCNC: 3.9 MG/DL (ref 2.5–4.5)
PHOSPHATE SERPL-MCNC: 3.9 MG/DL (ref 2.5–4.5)
PHOSPHATE SERPL-MCNC: 4 MG/DL (ref 2.5–4.5)
PHOSPHATE SERPL-MCNC: 4.1 MG/DL (ref 2.5–4.5)
PHOSPHATE SERPL-MCNC: 4.1 MG/DL (ref 2.5–4.5)
PHOSPHATE SERPL-MCNC: 4.3 MG/DL (ref 2.5–4.5)
PHOSPHATE SERPL-MCNC: 4.4 MG/DL (ref 2.5–4.5)
PHOSPHATE SERPL-MCNC: 4.5 MG/DL (ref 2.5–4.5)
PIGEON SERUM AB QL ID: NORMAL
PLATELET # BLD AUTO: 100 10E3/UL (ref 150–450)
PLATELET # BLD AUTO: 101 10E3/UL (ref 150–450)
PLATELET # BLD AUTO: 107 10E3/UL (ref 150–450)
PLATELET # BLD AUTO: 121 10E3/UL (ref 150–450)
PLATELET # BLD AUTO: 125 10E3/UL (ref 150–450)
PLATELET # BLD AUTO: 128 10E3/UL (ref 150–450)
PLATELET # BLD AUTO: 131 10E3/UL (ref 150–450)
PLATELET # BLD AUTO: 138 10E3/UL (ref 150–450)
PLATELET # BLD AUTO: 152 10E3/UL (ref 150–450)
PLATELET # BLD AUTO: 152 10E3/UL (ref 150–450)
PLATELET # BLD AUTO: 157 10E3/UL (ref 150–450)
PLATELET # BLD AUTO: 160 10E3/UL (ref 150–450)
PLATELET # BLD AUTO: 163 10E3/UL (ref 150–450)
PLATELET # BLD AUTO: 163 10E3/UL (ref 150–450)
PLATELET # BLD AUTO: 165 10E3/UL (ref 150–450)
PLATELET # BLD AUTO: 175 10E3/UL (ref 150–450)
PLATELET # BLD AUTO: 176 10E3/UL (ref 150–450)
PLATELET # BLD AUTO: 188 10E3/UL (ref 150–450)
PLATELET # BLD AUTO: 78 10E3/UL (ref 150–450)
PLATELET # BLD AUTO: 93 10E3/UL (ref 150–450)
PO2 BLD: 197 MM HG (ref 80–105)
PO2 BLD: 56 MM HG (ref 80–105)
PO2 BLD: 62 MM HG (ref 80–105)
PO2 BLD: 89 MM HG (ref 80–105)
PO2 BLD: 91 MM HG (ref 80–105)
PO2 BLDV: 19 MM HG (ref 25–47)
PO2 BLDV: 22 MM HG (ref 25–47)
PO2 BLDV: 28 MM HG (ref 25–47)
PO2 BLDV: 43 MM HG (ref 25–47)
PO2 BLDV: 49 MM HG (ref 25–47)
POTASSIUM SERPL-SCNC: 2.2 MMOL/L (ref 3.4–5.3)
POTASSIUM SERPL-SCNC: 2.3 MMOL/L (ref 3.4–5.3)
POTASSIUM SERPL-SCNC: 2.7 MMOL/L (ref 3.4–5.3)
POTASSIUM SERPL-SCNC: 2.8 MMOL/L (ref 3.4–5.3)
POTASSIUM SERPL-SCNC: 3 MMOL/L (ref 3.4–5.3)
POTASSIUM SERPL-SCNC: 3.2 MMOL/L (ref 3.4–5.3)
POTASSIUM SERPL-SCNC: 3.2 MMOL/L (ref 3.4–5.3)
POTASSIUM SERPL-SCNC: 3.3 MMOL/L (ref 3.4–5.3)
POTASSIUM SERPL-SCNC: 3.4 MMOL/L (ref 3.4–5.3)
POTASSIUM SERPL-SCNC: 3.5 MMOL/L (ref 3.4–5.3)
POTASSIUM SERPL-SCNC: 3.5 MMOL/L (ref 3.4–5.3)
POTASSIUM SERPL-SCNC: 3.6 MMOL/L (ref 3.4–5.3)
POTASSIUM SERPL-SCNC: 3.6 MMOL/L (ref 3.4–5.3)
POTASSIUM SERPL-SCNC: 3.7 MMOL/L (ref 3.4–5.3)
POTASSIUM SERPL-SCNC: 3.8 MMOL/L (ref 3.4–5.3)
POTASSIUM SERPL-SCNC: 3.9 MMOL/L (ref 3.4–5.3)
POTASSIUM SERPL-SCNC: 4 MMOL/L (ref 3.4–5.3)
POTASSIUM SERPL-SCNC: 4.1 MMOL/L (ref 3.4–5.3)
POTASSIUM SERPL-SCNC: 4.2 MMOL/L (ref 3.4–5.3)
POTASSIUM SERPL-SCNC: 4.2 MMOL/L (ref 3.4–5.3)
POTASSIUM SERPL-SCNC: 4.3 MMOL/L (ref 3.4–5.3)
POTASSIUM SERPL-SCNC: 4.4 MMOL/L (ref 3.4–5.3)
POTASSIUM SERPL-SCNC: 4.5 MMOL/L (ref 3.4–5.3)
POTASSIUM SERPL-SCNC: 4.5 MMOL/L (ref 3.4–5.3)
POTASSIUM SERPL-SCNC: 4.6 MMOL/L (ref 3.4–5.3)
POTASSIUM SERPL-SCNC: 4.8 MMOL/L (ref 3.4–5.3)
PR INTERVAL - MUSE: NORMAL MS
PROCALCITONIN SERPL IA-MCNC: 0.05 NG/ML
PROCALCITONIN SERPL IA-MCNC: 0.05 NG/ML
PROCALCITONIN SERPL IA-MCNC: 0.08 NG/ML
PROCALCITONIN SERPL IA-MCNC: 0.12 NG/ML
PROT SERPL-MCNC: 5 G/DL (ref 6.4–8.3)
PROT SERPL-MCNC: 6 G/DL (ref 6.4–8.3)
PROT SERPL-MCNC: 7 G/DL (ref 6.4–8.3)
PROT SERPL-MCNC: 7.5 G/DL (ref 6.4–8.3)
QRS DURATION - MUSE: 116 MS
QRS DURATION - MUSE: 122 MS
QRS DURATION - MUSE: 130 MS
QRS DURATION - MUSE: 132 MS
QT - MUSE: 400 MS
QT - MUSE: 412 MS
QT - MUSE: 478 MS
QT - MUSE: 482 MS
QTC - MUSE: 478 MS
QTC - MUSE: 505 MS
QTC - MUSE: 526 MS
QTC - MUSE: 555 MS
QUANTIFERON MITOGEN: 10 IU/ML
QUANTIFERON NIL TUBE: 0 IU/ML
QUANTIFERON TB1 TUBE: 0.01 IU/ML
QUANTIFERON TB2 TUBE: 0.02
R AXIS - MUSE: 100 DEGREES
R AXIS - MUSE: 125 DEGREES
R AXIS - MUSE: 127 DEGREES
R AXIS - MUSE: 98 DEGREES
RBC # BLD AUTO: 2.53 10E6/UL (ref 4.4–5.9)
RBC # BLD AUTO: 2.54 10E6/UL (ref 4.4–5.9)
RBC # BLD AUTO: 2.59 10E6/UL (ref 4.4–5.9)
RBC # BLD AUTO: 2.61 10E6/UL (ref 4.4–5.9)
RBC # BLD AUTO: 2.61 10E6/UL (ref 4.4–5.9)
RBC # BLD AUTO: 2.75 10E6/UL (ref 4.4–5.9)
RBC # BLD AUTO: 2.86 10E6/UL (ref 4.4–5.9)
RBC # BLD AUTO: 2.9 10E6/UL (ref 4.4–5.9)
RBC # BLD AUTO: 2.9 10E6/UL (ref 4.4–5.9)
RBC # BLD AUTO: 2.93 10E6/UL (ref 4.4–5.9)
RBC # BLD AUTO: 2.97 10E6/UL (ref 4.4–5.9)
RBC # BLD AUTO: 3.02 10E6/UL (ref 4.4–5.9)
RBC # BLD AUTO: 3.2 10E6/UL (ref 4.4–5.9)
RBC # BLD AUTO: 3.22 10E6/UL (ref 4.4–5.9)
RBC # BLD AUTO: 3.28 10E6/UL (ref 4.4–5.9)
RBC # BLD AUTO: 3.32 10E6/UL (ref 4.4–5.9)
RBC # BLD AUTO: 3.63 10E6/UL (ref 4.4–5.9)
RBC # BLD AUTO: 3.91 10E6/UL (ref 4.4–5.9)
RBC # BLD AUTO: 4.24 10E6/UL (ref 4.4–5.9)
RBC URINE: 0 /HPF
RBC URINE: <1 /HPF
RHEUMATOID FACT SERPL-ACNC: <10 IU/ML
RHEUMATOID FACT SERPL-ACNC: <10 IU/ML
RNAP III IGG SERPL IA-ACNC: 9 UNITS
RSV RNA SPEC NAA+PROBE: NEGATIVE
RSV RNA SPEC NAA+PROBE: NEGATIVE
RSV RNA SPEC QL NAA+PROBE: ABNORMAL
RSV RNA SPEC QL NAA+PROBE: ABNORMAL
RSV RNA SPEC QL NAA+PROBE: NOT DETECTED
RV+EV RNA SPEC QL NAA+PROBE: ABNORMAL
RV+EV RNA SPEC QL NAA+PROBE: NOT DETECTED
RV+EV RNA SPEC QL NAA+PROBE: NOT DETECTED
S RECTIVIRGULA AB SER QL ID: NORMAL
S VIRIDIS AB SER QL ID: NORMAL
SA TARGET DNA: NEGATIVE
SA TARGET DNA: POSITIVE
SA TARGET DNA: POSITIVE
SAO2 % BLDA: 100 % (ref 92–100)
SAO2 % BLDA: 88 % (ref 92–100)
SAO2 % BLDA: 92 % (ref 92–100)
SAO2 % BLDA: 96 % (ref 92–100)
SAO2 % BLDA: 97 % (ref 92–100)
SAO2 % BLDV: 24.7 % (ref 70–75)
SAO2 % BLDV: 33.2 % (ref 70–75)
SAO2 % BLDV: 47.1 % (ref 70–75)
SAO2 % BLDV: 79 % (ref 70–75)
SAO2 % BLDV: 85 % (ref 70–75)
SARS-COV-2 RNA RESP QL NAA+PROBE: NEGATIVE
SODIUM SERPL-SCNC: 135 MMOL/L (ref 135–145)
SODIUM SERPL-SCNC: 135 MMOL/L (ref 135–145)
SODIUM SERPL-SCNC: 136 MMOL/L (ref 135–145)
SODIUM SERPL-SCNC: 137 MMOL/L (ref 135–145)
SODIUM SERPL-SCNC: 138 MMOL/L (ref 135–145)
SODIUM SERPL-SCNC: 139 MMOL/L (ref 135–145)
SODIUM SERPL-SCNC: 140 MMOL/L (ref 135–145)
SODIUM SERPL-SCNC: 141 MMOL/L (ref 135–145)
SODIUM SERPL-SCNC: 143 MMOL/L (ref 135–145)
SODIUM SERPL-SCNC: 143 MMOL/L (ref 135–145)
SODIUM SERPL-SCNC: 144 MMOL/L (ref 135–145)
SODIUM SERPL-SCNC: 144 MMOL/L (ref 135–145)
SODIUM SERPL-SCNC: 145 MMOL/L (ref 135–145)
SP GR UR STRIP: 1.01 (ref 1–1.03)
SP GR UR STRIP: 1.01 (ref 1–1.03)
SPECIMEN EXPIRATION DATE: NORMAL
SQUAMOUS EPITHELIAL: <1 /HPF
SUBCLASSES, PERCENT: 97 %
SYSTOLIC BLOOD PRESSURE - MUSE: 127 MMHG
SYSTOLIC BLOOD PRESSURE - MUSE: NORMAL MMHG
T AXIS - MUSE: -28 DEGREES
T AXIS - MUSE: -35 DEGREES
T AXIS - MUSE: -85 DEGREES
T AXIS - MUSE: 5 DEGREES
T CANDIDUS AB SER QL: NORMAL
TROPONIN T SERPL HS-MCNC: 28 NG/L
TROPONIN T SERPL HS-MCNC: 38 NG/L
TROPONIN T SERPL HS-MCNC: 47 NG/L
TROPONIN T SERPL HS-MCNC: 61 NG/L
TROPONIN T SERPL HS-MCNC: 77 NG/L
TSH SERPL DL<=0.005 MIU/L-ACNC: 1.35 UIU/ML (ref 0.3–4.2)
U1 SNRNP IGG SER IA-ACNC: 1.5 U/ML
U1 SNRNP IGG SER IA-ACNC: NEGATIVE
UFH PPP CHRO-ACNC: <0.1 IU/ML
UROBILINOGEN UR STRIP-MCNC: <2 MG/DL
UROBILINOGEN UR STRIP-MCNC: NORMAL MG/DL
VENTRICULAR RATE- MUSE: 73 BPM
VENTRICULAR RATE- MUSE: 80 BPM
VENTRICULAR RATE- MUSE: 81 BPM
VENTRICULAR RATE- MUSE: 96 BPM
WBC # BLD AUTO: 10.2 10E3/UL (ref 4–11)
WBC # BLD AUTO: 10.2 10E3/UL (ref 4–11)
WBC # BLD AUTO: 10.7 10E3/UL (ref 4–11)
WBC # BLD AUTO: 11.2 10E3/UL (ref 4–11)
WBC # BLD AUTO: 11.2 10E3/UL (ref 4–11)
WBC # BLD AUTO: 11.4 10E3/UL (ref 4–11)
WBC # BLD AUTO: 11.9 10E3/UL (ref 4–11)
WBC # BLD AUTO: 11.9 10E3/UL (ref 4–11)
WBC # BLD AUTO: 12.9 10E3/UL (ref 4–11)
WBC # BLD AUTO: 13.4 10E3/UL (ref 4–11)
WBC # BLD AUTO: 14.1 10E3/UL (ref 4–11)
WBC # BLD AUTO: 14.3 10E3/UL (ref 4–11)
WBC # BLD AUTO: 14.4 10E3/UL (ref 4–11)
WBC # BLD AUTO: 14.6 10E3/UL (ref 4–11)
WBC # BLD AUTO: 15.1 10E3/UL (ref 4–11)
WBC # BLD AUTO: 16.5 10E3/UL (ref 4–11)
WBC # BLD AUTO: 8.3 10E3/UL (ref 4–11)
WBC # BLD AUTO: 8.5 10E3/UL (ref 4–11)
WBC # BLD AUTO: 9.4 10E3/UL (ref 4–11)
WBC URINE: 0 /HPF
WBC URINE: <1 /HPF

## 2024-01-01 PROCEDURE — 94640 AIRWAY INHALATION TREATMENT: CPT

## 2024-01-01 PROCEDURE — 94660 CPAP INITIATION&MGMT: CPT

## 2024-01-01 PROCEDURE — 250N000013 HC RX MED GY IP 250 OP 250 PS 637: Performed by: STUDENT IN AN ORGANIZED HEALTH CARE EDUCATION/TRAINING PROGRAM

## 2024-01-01 PROCEDURE — 92526 ORAL FUNCTION THERAPY: CPT | Mod: GN

## 2024-01-01 PROCEDURE — 97110 THERAPEUTIC EXERCISES: CPT | Mod: GP | Performed by: PHYSICAL THERAPIST

## 2024-01-01 PROCEDURE — 250N000013 HC RX MED GY IP 250 OP 250 PS 637: Performed by: EMERGENCY MEDICINE

## 2024-01-01 PROCEDURE — 71250 CT THORAX DX C-: CPT

## 2024-01-01 PROCEDURE — 999N000157 HC STATISTIC RCP TIME EA 10 MIN

## 2024-01-01 PROCEDURE — 80053 COMPREHEN METABOLIC PANEL: CPT

## 2024-01-01 PROCEDURE — 36415 COLL VENOUS BLD VENIPUNCTURE: CPT | Performed by: INTERNAL MEDICINE

## 2024-01-01 PROCEDURE — 85520 HEPARIN ASSAY: CPT | Performed by: STUDENT IN AN ORGANIZED HEALTH CARE EDUCATION/TRAINING PROGRAM

## 2024-01-01 PROCEDURE — 71045 X-RAY EXAM CHEST 1 VIEW: CPT | Mod: 26 | Performed by: RADIOLOGY

## 2024-01-01 PROCEDURE — 250N000009 HC RX 250: Performed by: STUDENT IN AN ORGANIZED HEALTH CARE EDUCATION/TRAINING PROGRAM

## 2024-01-01 PROCEDURE — 93295 DEV INTERROG REMOTE 1/2/MLT: CPT | Performed by: INTERNAL MEDICINE

## 2024-01-01 PROCEDURE — 80048 BASIC METABOLIC PNL TOTAL CA: CPT

## 2024-01-01 PROCEDURE — 250N000013 HC RX MED GY IP 250 OP 250 PS 637: Performed by: PEDIATRICS

## 2024-01-01 PROCEDURE — 97164 PT RE-EVAL EST PLAN CARE: CPT | Mod: GP | Performed by: PHYSICAL THERAPIST

## 2024-01-01 PROCEDURE — 82805 BLOOD GASES W/O2 SATURATION: CPT | Performed by: STUDENT IN AN ORGANIZED HEALTH CARE EDUCATION/TRAINING PROGRAM

## 2024-01-01 PROCEDURE — 250N000012 HC RX MED GY IP 250 OP 636 PS 637: Performed by: STUDENT IN AN ORGANIZED HEALTH CARE EDUCATION/TRAINING PROGRAM

## 2024-01-01 PROCEDURE — 36415 COLL VENOUS BLD VENIPUNCTURE: CPT | Performed by: STUDENT IN AN ORGANIZED HEALTH CARE EDUCATION/TRAINING PROGRAM

## 2024-01-01 PROCEDURE — 36415 COLL VENOUS BLD VENIPUNCTURE: CPT

## 2024-01-01 PROCEDURE — 250N000009 HC RX 250: Performed by: INTERNAL MEDICINE

## 2024-01-01 PROCEDURE — 85027 COMPLETE CBC AUTOMATED: CPT

## 2024-01-01 PROCEDURE — 93308 TTE F-UP OR LMTD: CPT | Mod: 26 | Performed by: INTERNAL MEDICINE

## 2024-01-01 PROCEDURE — 83880 ASSAY OF NATRIURETIC PEPTIDE: CPT | Performed by: EMERGENCY MEDICINE

## 2024-01-01 PROCEDURE — 250N000013 HC RX MED GY IP 250 OP 250 PS 637: Performed by: INTERNAL MEDICINE

## 2024-01-01 PROCEDURE — 83735 ASSAY OF MAGNESIUM: CPT | Performed by: EMERGENCY MEDICINE

## 2024-01-01 PROCEDURE — 85610 PROTHROMBIN TIME: CPT | Performed by: INTERNAL MEDICINE

## 2024-01-01 PROCEDURE — 999N000215 HC STATISTIC HFNC ADULT NON-CPAP

## 2024-01-01 PROCEDURE — 250N000012 HC RX MED GY IP 250 OP 636 PS 637

## 2024-01-01 PROCEDURE — 85049 AUTOMATED PLATELET COUNT: CPT | Performed by: STUDENT IN AN ORGANIZED HEALTH CARE EDUCATION/TRAINING PROGRAM

## 2024-01-01 PROCEDURE — 82805 BLOOD GASES W/O2 SATURATION: CPT | Performed by: INTERNAL MEDICINE

## 2024-01-01 PROCEDURE — 99223 1ST HOSP IP/OBS HIGH 75: CPT | Performed by: INTERNAL MEDICINE

## 2024-01-01 PROCEDURE — 84443 ASSAY THYROID STIM HORMONE: CPT | Performed by: STUDENT IN AN ORGANIZED HEALTH CARE EDUCATION/TRAINING PROGRAM

## 2024-01-01 PROCEDURE — 36415 COLL VENOUS BLD VENIPUNCTURE: CPT | Performed by: FAMILY MEDICINE

## 2024-01-01 PROCEDURE — 99291 CRITICAL CARE FIRST HOUR: CPT | Mod: 25 | Performed by: INTERNAL MEDICINE

## 2024-01-01 PROCEDURE — 250N000011 HC RX IP 250 OP 636: Performed by: INTERNAL MEDICINE

## 2024-01-01 PROCEDURE — 258N000003 HC RX IP 258 OP 636: Performed by: PHYSICIAN ASSISTANT

## 2024-01-01 PROCEDURE — 36415 COLL VENOUS BLD VENIPUNCTURE: CPT | Performed by: PHYSICIAN ASSISTANT

## 2024-01-01 PROCEDURE — 99233 SBSQ HOSP IP/OBS HIGH 50: CPT | Performed by: INTERNAL MEDICINE

## 2024-01-01 PROCEDURE — 93970 EXTREMITY STUDY: CPT

## 2024-01-01 PROCEDURE — 97530 THERAPEUTIC ACTIVITIES: CPT | Mod: GP | Performed by: PHYSICAL THERAPIST

## 2024-01-01 PROCEDURE — 999N000043 HC STATISTIC CTO2 CONT OXYGEN TECH TIME EA 90 MIN

## 2024-01-01 PROCEDURE — 250N000011 HC RX IP 250 OP 636: Performed by: STUDENT IN AN ORGANIZED HEALTH CARE EDUCATION/TRAINING PROGRAM

## 2024-01-01 PROCEDURE — 255N000002 HC RX 255 OP 636: Performed by: INTERNAL MEDICINE

## 2024-01-01 PROCEDURE — 87040 BLOOD CULTURE FOR BACTERIA: CPT | Performed by: INTERNAL MEDICINE

## 2024-01-01 PROCEDURE — 80053 COMPREHEN METABOLIC PANEL: CPT | Performed by: STUDENT IN AN ORGANIZED HEALTH CARE EDUCATION/TRAINING PROGRAM

## 2024-01-01 PROCEDURE — 84100 ASSAY OF PHOSPHORUS: CPT | Performed by: INTERNAL MEDICINE

## 2024-01-01 PROCEDURE — 250N000013 HC RX MED GY IP 250 OP 250 PS 637

## 2024-01-01 PROCEDURE — 86235 NUCLEAR ANTIGEN ANTIBODY: CPT | Performed by: STUDENT IN AN ORGANIZED HEALTH CARE EDUCATION/TRAINING PROGRAM

## 2024-01-01 PROCEDURE — 36600 WITHDRAWAL OF ARTERIAL BLOOD: CPT

## 2024-01-01 PROCEDURE — 94640 AIRWAY INHALATION TREATMENT: CPT | Mod: 76

## 2024-01-01 PROCEDURE — 120N000003 HC R&B IMCU UMMC

## 2024-01-01 PROCEDURE — 93010 ELECTROCARDIOGRAM REPORT: CPT | Performed by: INTERNAL MEDICINE

## 2024-01-01 PROCEDURE — 85610 PROTHROMBIN TIME: CPT | Performed by: STUDENT IN AN ORGANIZED HEALTH CARE EDUCATION/TRAINING PROGRAM

## 2024-01-01 PROCEDURE — 99233 SBSQ HOSP IP/OBS HIGH 50: CPT | Mod: GC | Performed by: INTERNAL MEDICINE

## 2024-01-01 PROCEDURE — 97530 THERAPEUTIC ACTIVITIES: CPT | Mod: GP

## 2024-01-01 PROCEDURE — 86481 TB AG RESPONSE T-CELL SUSP: CPT | Performed by: PHYSICIAN ASSISTANT

## 2024-01-01 PROCEDURE — 999N000185 HC STATISTIC TRANSPORT TIME EA 15 MIN

## 2024-01-01 PROCEDURE — 87040 BLOOD CULTURE FOR BACTERIA: CPT | Performed by: PEDIATRICS

## 2024-01-01 PROCEDURE — 87640 STAPH A DNA AMP PROBE: CPT | Performed by: PHYSICIAN ASSISTANT

## 2024-01-01 PROCEDURE — 82085 ASSAY OF ALDOLASE: CPT | Performed by: STUDENT IN AN ORGANIZED HEALTH CARE EDUCATION/TRAINING PROGRAM

## 2024-01-01 PROCEDURE — 250N000009 HC RX 250: Performed by: EMERGENCY MEDICINE

## 2024-01-01 PROCEDURE — 87389 HIV-1 AG W/HIV-1&-2 AB AG IA: CPT | Performed by: STUDENT IN AN ORGANIZED HEALTH CARE EDUCATION/TRAINING PROGRAM

## 2024-01-01 PROCEDURE — 93005 ELECTROCARDIOGRAM TRACING: CPT

## 2024-01-01 PROCEDURE — 250N000011 HC RX IP 250 OP 636

## 2024-01-01 PROCEDURE — A9567 TECHNETIUM TC-99M AEROSOL: HCPCS | Performed by: INTERNAL MEDICINE

## 2024-01-01 PROCEDURE — 83735 ASSAY OF MAGNESIUM: CPT | Performed by: INTERNAL MEDICINE

## 2024-01-01 PROCEDURE — 87040 BLOOD CULTURE FOR BACTERIA: CPT | Performed by: STUDENT IN AN ORGANIZED HEALTH CARE EDUCATION/TRAINING PROGRAM

## 2024-01-01 PROCEDURE — 84100 ASSAY OF PHOSPHORUS: CPT | Performed by: STUDENT IN AN ORGANIZED HEALTH CARE EDUCATION/TRAINING PROGRAM

## 2024-01-01 PROCEDURE — 84484 ASSAY OF TROPONIN QUANT: CPT | Performed by: PHYSICIAN ASSISTANT

## 2024-01-01 PROCEDURE — 99222 1ST HOSP IP/OBS MODERATE 55: CPT | Performed by: INTERNAL MEDICINE

## 2024-01-01 PROCEDURE — 99233 SBSQ HOSP IP/OBS HIGH 50: CPT | Performed by: STUDENT IN AN ORGANIZED HEALTH CARE EDUCATION/TRAINING PROGRAM

## 2024-01-01 PROCEDURE — 93005 ELECTROCARDIOGRAM TRACING: CPT | Performed by: EMERGENCY MEDICINE

## 2024-01-01 PROCEDURE — 93970 EXTREMITY STUDY: CPT | Mod: 26 | Performed by: RADIOLOGY

## 2024-01-01 PROCEDURE — 86900 BLOOD TYPING SEROLOGIC ABO: CPT | Performed by: STUDENT IN AN ORGANIZED HEALTH CARE EDUCATION/TRAINING PROGRAM

## 2024-01-01 PROCEDURE — 86038 ANTINUCLEAR ANTIBODIES: CPT | Performed by: STUDENT IN AN ORGANIZED HEALTH CARE EDUCATION/TRAINING PROGRAM

## 2024-01-01 PROCEDURE — 4A023N6 MEASUREMENT OF CARDIAC SAMPLING AND PRESSURE, RIGHT HEART, PERCUTANEOUS APPROACH: ICD-10-PCS | Performed by: INTERNAL MEDICINE

## 2024-01-01 PROCEDURE — 86140 C-REACTIVE PROTEIN: CPT | Performed by: INTERNAL MEDICINE

## 2024-01-01 PROCEDURE — 97161 PT EVAL LOW COMPLEX 20 MIN: CPT | Mod: GP | Performed by: PHYSICAL THERAPIST

## 2024-01-01 PROCEDURE — 93321 DOPPLER ECHO F-UP/LMTD STD: CPT | Mod: 26 | Performed by: INTERNAL MEDICINE

## 2024-01-01 PROCEDURE — 250N000011 HC RX IP 250 OP 636: Performed by: PHYSICIAN ASSISTANT

## 2024-01-01 PROCEDURE — 87449 NOS EACH ORGANISM AG IA: CPT | Performed by: PHYSICIAN ASSISTANT

## 2024-01-01 PROCEDURE — 99418 PROLNG IP/OBS E/M EA 15 MIN: CPT | Performed by: STUDENT IN AN ORGANIZED HEALTH CARE EDUCATION/TRAINING PROGRAM

## 2024-01-01 PROCEDURE — 83735 ASSAY OF MAGNESIUM: CPT | Performed by: STUDENT IN AN ORGANIZED HEALTH CARE EDUCATION/TRAINING PROGRAM

## 2024-01-01 PROCEDURE — 84484 ASSAY OF TROPONIN QUANT: CPT | Performed by: INTERNAL MEDICINE

## 2024-01-01 PROCEDURE — 81001 URINALYSIS AUTO W/SCOPE: CPT | Performed by: EMERGENCY MEDICINE

## 2024-01-01 PROCEDURE — 99214 OFFICE O/P EST MOD 30 MIN: CPT | Performed by: INTERNAL MEDICINE

## 2024-01-01 PROCEDURE — 87206 SMEAR FLUORESCENT/ACID STAI: CPT | Performed by: STUDENT IN AN ORGANIZED HEALTH CARE EDUCATION/TRAINING PROGRAM

## 2024-01-01 PROCEDURE — 82805 BLOOD GASES W/O2 SATURATION: CPT

## 2024-01-01 PROCEDURE — 36415 COLL VENOUS BLD VENIPUNCTURE: CPT | Performed by: EMERGENCY MEDICINE

## 2024-01-01 PROCEDURE — P9047 ALBUMIN (HUMAN), 25%, 50ML: HCPCS | Performed by: INTERNAL MEDICINE

## 2024-01-01 PROCEDURE — 99233 SBSQ HOSP IP/OBS HIGH 50: CPT | Performed by: PEDIATRICS

## 2024-01-01 PROCEDURE — 200N000002 HC R&B ICU UMMC

## 2024-01-01 PROCEDURE — 84100 ASSAY OF PHOSPHORUS: CPT | Performed by: PEDIATRICS

## 2024-01-01 PROCEDURE — 97535 SELF CARE MNGMENT TRAINING: CPT | Mod: GP | Performed by: PHYSICAL THERAPIST

## 2024-01-01 PROCEDURE — 96365 THER/PROPH/DIAG IV INF INIT: CPT

## 2024-01-01 PROCEDURE — 97110 THERAPEUTIC EXERCISES: CPT | Mod: GP

## 2024-01-01 PROCEDURE — 86140 C-REACTIVE PROTEIN: CPT | Performed by: PHYSICIAN ASSISTANT

## 2024-01-01 PROCEDURE — 83605 ASSAY OF LACTIC ACID: CPT | Performed by: STUDENT IN AN ORGANIZED HEALTH CARE EDUCATION/TRAINING PROGRAM

## 2024-01-01 PROCEDURE — 82374 ASSAY BLOOD CARBON DIOXIDE: CPT | Performed by: STUDENT IN AN ORGANIZED HEALTH CARE EDUCATION/TRAINING PROGRAM

## 2024-01-01 PROCEDURE — 99207 PR APP CREDIT; MD BILLING SHARED VISIT: CPT | Performed by: PHYSICIAN ASSISTANT

## 2024-01-01 PROCEDURE — 87641 MR-STAPH DNA AMP PROBE: CPT | Performed by: INTERNAL MEDICINE

## 2024-01-01 PROCEDURE — 250N000013 HC RX MED GY IP 250 OP 250 PS 637: Performed by: FAMILY MEDICINE

## 2024-01-01 PROCEDURE — 84484 ASSAY OF TROPONIN QUANT: CPT | Performed by: EMERGENCY MEDICINE

## 2024-01-01 PROCEDURE — 85610 PROTHROMBIN TIME: CPT | Performed by: EMERGENCY MEDICINE

## 2024-01-01 PROCEDURE — 36415 COLL VENOUS BLD VENIPUNCTURE: CPT | Performed by: PEDIATRICS

## 2024-01-01 PROCEDURE — 86803 HEPATITIS C AB TEST: CPT | Performed by: STUDENT IN AN ORGANIZED HEALTH CARE EDUCATION/TRAINING PROGRAM

## 2024-01-01 PROCEDURE — 83735 ASSAY OF MAGNESIUM: CPT | Performed by: PEDIATRICS

## 2024-01-01 PROCEDURE — 87070 CULTURE OTHR SPECIMN AEROBIC: CPT | Performed by: INTERNAL MEDICINE

## 2024-01-01 PROCEDURE — 97116 GAIT TRAINING THERAPY: CPT | Mod: GP | Performed by: PHYSICAL THERAPIST

## 2024-01-01 PROCEDURE — 87205 SMEAR GRAM STAIN: CPT | Performed by: INTERNAL MEDICINE

## 2024-01-01 PROCEDURE — 87635 SARS-COV-2 COVID-19 AMP PRB: CPT | Performed by: STUDENT IN AN ORGANIZED HEALTH CARE EDUCATION/TRAINING PROGRAM

## 2024-01-01 PROCEDURE — 99291 CRITICAL CARE FIRST HOUR: CPT | Mod: 25

## 2024-01-01 PROCEDURE — 272N000054 HC CANNULA HIGH FLOW, ADULT

## 2024-01-01 PROCEDURE — 97530 THERAPEUTIC ACTIVITIES: CPT | Mod: GP | Performed by: REHABILITATION PRACTITIONER

## 2024-01-01 PROCEDURE — 272N000001 HC OR GENERAL SUPPLY STERILE: Performed by: INTERNAL MEDICINE

## 2024-01-01 PROCEDURE — 85652 RBC SED RATE AUTOMATED: CPT | Performed by: STUDENT IN AN ORGANIZED HEALTH CARE EDUCATION/TRAINING PROGRAM

## 2024-01-01 PROCEDURE — 86140 C-REACTIVE PROTEIN: CPT | Performed by: STUDENT IN AN ORGANIZED HEALTH CARE EDUCATION/TRAINING PROGRAM

## 2024-01-01 PROCEDURE — 93451 RIGHT HEART CATH: CPT | Performed by: INTERNAL MEDICINE

## 2024-01-01 PROCEDURE — 82248 BILIRUBIN DIRECT: CPT | Performed by: INTERNAL MEDICINE

## 2024-01-01 PROCEDURE — 99291 CRITICAL CARE FIRST HOUR: CPT | Mod: FS | Performed by: INTERNAL MEDICINE

## 2024-01-01 PROCEDURE — 85025 COMPLETE CBC W/AUTO DIFF WBC: CPT | Performed by: EMERGENCY MEDICINE

## 2024-01-01 PROCEDURE — 99292 CRITICAL CARE ADDL 30 MIN: CPT

## 2024-01-01 PROCEDURE — 250N000013 HC RX MED GY IP 250 OP 250 PS 637: Performed by: PHYSICIAN ASSISTANT

## 2024-01-01 PROCEDURE — 92526 ORAL FUNCTION THERAPY: CPT | Mod: GN | Performed by: SPEECH-LANGUAGE PATHOLOGIST

## 2024-01-01 PROCEDURE — 83605 ASSAY OF LACTIC ACID: CPT | Performed by: INTERNAL MEDICINE

## 2024-01-01 PROCEDURE — C1751 CATH, INF, PER/CENT/MIDLINE: HCPCS | Performed by: INTERNAL MEDICINE

## 2024-01-01 PROCEDURE — 99233 SBSQ HOSP IP/OBS HIGH 50: CPT | Mod: FS | Performed by: STUDENT IN AN ORGANIZED HEALTH CARE EDUCATION/TRAINING PROGRAM

## 2024-01-01 PROCEDURE — 93325 DOPPLER ECHO COLOR FLOW MAPG: CPT | Mod: 26 | Performed by: INTERNAL MEDICINE

## 2024-01-01 PROCEDURE — 84145 PROCALCITONIN (PCT): CPT | Performed by: INTERNAL MEDICINE

## 2024-01-01 PROCEDURE — 93306 TTE W/DOPPLER COMPLETE: CPT | Mod: 26 | Performed by: INTERNAL MEDICINE

## 2024-01-01 PROCEDURE — 99291 CRITICAL CARE FIRST HOUR: CPT | Performed by: INTERNAL MEDICINE

## 2024-01-01 PROCEDURE — 250N000011 HC RX IP 250 OP 636: Mod: JZ | Performed by: INTERNAL MEDICINE

## 2024-01-01 PROCEDURE — 99291 CRITICAL CARE FIRST HOUR: CPT | Mod: GC | Performed by: INTERNAL MEDICINE

## 2024-01-01 PROCEDURE — 83550 IRON BINDING TEST: CPT | Performed by: STUDENT IN AN ORGANIZED HEALTH CARE EDUCATION/TRAINING PROGRAM

## 2024-01-01 PROCEDURE — 343N000001 HC RX 343: Performed by: INTERNAL MEDICINE

## 2024-01-01 PROCEDURE — 71045 X-RAY EXAM CHEST 1 VIEW: CPT

## 2024-01-01 PROCEDURE — 86235 NUCLEAR ANTIGEN ANTIBODY: CPT

## 2024-01-01 PROCEDURE — 5A09557 ASSISTANCE WITH RESPIRATORY VENTILATION, GREATER THAN 96 CONSECUTIVE HOURS, CONTINUOUS POSITIVE AIRWAY PRESSURE: ICD-10-PCS | Performed by: INTERNAL MEDICINE

## 2024-01-01 PROCEDURE — 87350 HEPATITIS BE AG IA: CPT | Performed by: STUDENT IN AN ORGANIZED HEALTH CARE EDUCATION/TRAINING PROGRAM

## 2024-01-01 PROCEDURE — 258N000003 HC RX IP 258 OP 636: Performed by: INTERNAL MEDICINE

## 2024-01-01 PROCEDURE — 85379 FIBRIN DEGRADATION QUANT: CPT | Performed by: INTERNAL MEDICINE

## 2024-01-01 PROCEDURE — 82550 ASSAY OF CK (CPK): CPT | Performed by: STUDENT IN AN ORGANIZED HEALTH CARE EDUCATION/TRAINING PROGRAM

## 2024-01-01 PROCEDURE — 83605 ASSAY OF LACTIC ACID: CPT | Performed by: PEDIATRICS

## 2024-01-01 PROCEDURE — 85018 HEMOGLOBIN: CPT

## 2024-01-01 PROCEDURE — 87106 FUNGI IDENTIFICATION YEAST: CPT | Performed by: STUDENT IN AN ORGANIZED HEALTH CARE EDUCATION/TRAINING PROGRAM

## 2024-01-01 PROCEDURE — 93451 RIGHT HEART CATH: CPT | Mod: 26 | Performed by: INTERNAL MEDICINE

## 2024-01-01 PROCEDURE — 99222 1ST HOSP IP/OBS MODERATE 55: CPT | Mod: GC | Performed by: INTERNAL MEDICINE

## 2024-01-01 PROCEDURE — 99207 PR NO BILLABLE SERVICE THIS VISIT: CPT

## 2024-01-01 PROCEDURE — 82610 CYSTATIN C: CPT | Performed by: INTERNAL MEDICINE

## 2024-01-01 PROCEDURE — 71250 CT THORAX DX C-: CPT | Mod: 26 | Performed by: RADIOLOGY

## 2024-01-01 PROCEDURE — 999N000208 ECHOCARDIOGRAM COMPLETE

## 2024-01-01 PROCEDURE — 86431 RHEUMATOID FACTOR QUANT: CPT | Performed by: STUDENT IN AN ORGANIZED HEALTH CARE EDUCATION/TRAINING PROGRAM

## 2024-01-01 PROCEDURE — 92610 EVALUATE SWALLOWING FUNCTION: CPT | Mod: GN

## 2024-01-01 PROCEDURE — 87385 HISTOPLASMA CAPSUL AG IA: CPT | Performed by: PHYSICIAN ASSISTANT

## 2024-01-01 PROCEDURE — 87205 SMEAR GRAM STAIN: CPT | Performed by: STUDENT IN AN ORGANIZED HEALTH CARE EDUCATION/TRAINING PROGRAM

## 2024-01-01 PROCEDURE — 999N000128 HC STATISTIC PERIPHERAL IV START W/O US GUIDANCE

## 2024-01-01 PROCEDURE — 120N000002 HC R&B MED SURG/OB UMMC

## 2024-01-01 PROCEDURE — 78580 LUNG PERFUSION IMAGING: CPT

## 2024-01-01 PROCEDURE — 84132 ASSAY OF SERUM POTASSIUM: CPT | Performed by: STUDENT IN AN ORGANIZED HEALTH CARE EDUCATION/TRAINING PROGRAM

## 2024-01-01 PROCEDURE — 99207 PR NO BILLABLE SERVICE THIS VISIT: CPT | Performed by: STUDENT IN AN ORGANIZED HEALTH CARE EDUCATION/TRAINING PROGRAM

## 2024-01-01 PROCEDURE — 93325 DOPPLER ECHO COLOR FLOW MAPG: CPT

## 2024-01-01 PROCEDURE — 99207 PR NO BILLABLE SERVICE THIS VISIT: CPT | Performed by: INTERNAL MEDICINE

## 2024-01-01 PROCEDURE — 87637 SARSCOV2&INF A&B&RSV AMP PRB: CPT | Performed by: INTERNAL MEDICINE

## 2024-01-01 PROCEDURE — 84484 ASSAY OF TROPONIN QUANT: CPT | Performed by: STUDENT IN AN ORGANIZED HEALTH CARE EDUCATION/TRAINING PROGRAM

## 2024-01-01 PROCEDURE — 83540 ASSAY OF IRON: CPT | Performed by: STUDENT IN AN ORGANIZED HEALTH CARE EDUCATION/TRAINING PROGRAM

## 2024-01-01 PROCEDURE — 272N000035 NM LUNG SCAN VENTILATION AND PERFUSION

## 2024-01-01 PROCEDURE — 83880 ASSAY OF NATRIURETIC PEPTIDE: CPT | Performed by: INTERNAL MEDICINE

## 2024-01-01 PROCEDURE — 87633 RESP VIRUS 12-25 TARGETS: CPT | Performed by: INTERNAL MEDICINE

## 2024-01-01 PROCEDURE — 92611 MOTION FLUOROSCOPY/SWALLOW: CPT | Mod: GN

## 2024-01-01 PROCEDURE — 83036 HEMOGLOBIN GLYCOSYLATED A1C: CPT | Performed by: PHYSICIAN ASSISTANT

## 2024-01-01 PROCEDURE — 250N000011 HC RX IP 250 OP 636: Performed by: EMERGENCY MEDICINE

## 2024-01-01 PROCEDURE — 83516 IMMUNOASSAY NONANTIBODY: CPT | Performed by: STUDENT IN AN ORGANIZED HEALTH CARE EDUCATION/TRAINING PROGRAM

## 2024-01-01 PROCEDURE — 80048 BASIC METABOLIC PNL TOTAL CA: CPT | Performed by: EMERGENCY MEDICINE

## 2024-01-01 PROCEDURE — 83605 ASSAY OF LACTIC ACID: CPT | Performed by: EMERGENCY MEDICINE

## 2024-01-01 PROCEDURE — 85610 PROTHROMBIN TIME: CPT | Performed by: FAMILY MEDICINE

## 2024-01-01 PROCEDURE — A9540 TC99M MAA: HCPCS | Performed by: INTERNAL MEDICINE

## 2024-01-01 PROCEDURE — 83605 ASSAY OF LACTIC ACID: CPT | Performed by: PHYSICIAN ASSISTANT

## 2024-01-01 PROCEDURE — 99292 CRITICAL CARE ADDL 30 MIN: CPT | Mod: FS | Performed by: INTERNAL MEDICINE

## 2024-01-01 PROCEDURE — 85014 HEMATOCRIT: CPT

## 2024-01-01 PROCEDURE — 84132 ASSAY OF SERUM POTASSIUM: CPT | Performed by: FAMILY MEDICINE

## 2024-01-01 PROCEDURE — 74230 X-RAY XM SWLNG FUNCJ C+: CPT | Mod: 26 | Performed by: STUDENT IN AN ORGANIZED HEALTH CARE EDUCATION/TRAINING PROGRAM

## 2024-01-01 PROCEDURE — 99222 1ST HOSP IP/OBS MODERATE 55: CPT | Performed by: FAMILY MEDICINE

## 2024-01-01 PROCEDURE — 93296 REM INTERROG EVL PM/IDS: CPT | Performed by: INTERNAL MEDICINE

## 2024-01-01 PROCEDURE — 82784 ASSAY IGA/IGD/IGG/IGM EACH: CPT | Performed by: STUDENT IN AN ORGANIZED HEALTH CARE EDUCATION/TRAINING PROGRAM

## 2024-01-01 PROCEDURE — 99232 SBSQ HOSP IP/OBS MODERATE 35: CPT | Performed by: STUDENT IN AN ORGANIZED HEALTH CARE EDUCATION/TRAINING PROGRAM

## 2024-01-01 PROCEDURE — 5A09357 ASSISTANCE WITH RESPIRATORY VENTILATION, LESS THAN 24 CONSECUTIVE HOURS, CONTINUOUS POSITIVE AIRWAY PRESSURE: ICD-10-PCS | Performed by: EMERGENCY MEDICINE

## 2024-01-01 PROCEDURE — 99233 SBSQ HOSP IP/OBS HIGH 50: CPT | Performed by: PHYSICIAN ASSISTANT

## 2024-01-01 PROCEDURE — 84132 ASSAY OF SERUM POTASSIUM: CPT | Performed by: INTERNAL MEDICINE

## 2024-01-01 PROCEDURE — 99451 NTRPROF PH1/NTRNET/EHR 5/>: CPT | Mod: 25 | Performed by: INTERNAL MEDICINE

## 2024-01-01 PROCEDURE — 78582 LUNG VENTILAT&PERFUS IMAGING: CPT | Mod: 26 | Performed by: RADIOLOGY

## 2024-01-01 PROCEDURE — 87102 FUNGUS ISOLATION CULTURE: CPT | Performed by: STUDENT IN AN ORGANIZED HEALTH CARE EDUCATION/TRAINING PROGRAM

## 2024-01-01 PROCEDURE — 80053 COMPREHEN METABOLIC PANEL: CPT | Performed by: INTERNAL MEDICINE

## 2024-01-01 PROCEDURE — 97116 GAIT TRAINING THERAPY: CPT | Mod: GP

## 2024-01-01 PROCEDURE — 99418 PROLNG IP/OBS E/M EA 15 MIN: CPT | Performed by: INTERNAL MEDICINE

## 2024-01-01 PROCEDURE — 84145 PROCALCITONIN (PCT): CPT | Performed by: PHYSICIAN ASSISTANT

## 2024-01-01 PROCEDURE — 86606 ASPERGILLUS ANTIBODY: CPT | Performed by: STUDENT IN AN ORGANIZED HEALTH CARE EDUCATION/TRAINING PROGRAM

## 2024-01-01 PROCEDURE — 99232 SBSQ HOSP IP/OBS MODERATE 35: CPT | Mod: GC | Performed by: INTERNAL MEDICINE

## 2024-01-01 PROCEDURE — 85730 THROMBOPLASTIN TIME PARTIAL: CPT | Performed by: STUDENT IN AN ORGANIZED HEALTH CARE EDUCATION/TRAINING PROGRAM

## 2024-01-01 PROCEDURE — 71045 X-RAY EXAM CHEST 1 VIEW: CPT | Mod: 26 | Performed by: STUDENT IN AN ORGANIZED HEALTH CARE EDUCATION/TRAINING PROGRAM

## 2024-01-01 PROCEDURE — 120N000001 HC R&B MED SURG/OB

## 2024-01-01 PROCEDURE — 87305 ASPERGILLUS AG IA: CPT | Performed by: PHYSICIAN ASSISTANT

## 2024-01-01 PROCEDURE — 258N000003 HC RX IP 258 OP 636: Performed by: EMERGENCY MEDICINE

## 2024-01-01 PROCEDURE — 85025 COMPLETE CBC W/AUTO DIFF WBC: CPT | Performed by: INTERNAL MEDICINE

## 2024-01-01 PROCEDURE — 80048 BASIC METABOLIC PNL TOTAL CA: CPT | Performed by: STUDENT IN AN ORGANIZED HEALTH CARE EDUCATION/TRAINING PROGRAM

## 2024-01-01 PROCEDURE — 78580 LUNG PERFUSION IMAGING: CPT | Mod: 26 | Performed by: RADIOLOGY

## 2024-01-01 PROCEDURE — 74230 X-RAY XM SWLNG FUNCJ C+: CPT

## 2024-01-01 PROCEDURE — 258N000003 HC RX IP 258 OP 636: Performed by: PEDIATRICS

## 2024-01-01 PROCEDURE — 999N000111 HC STATISTIC OT IP EVAL DEFER

## 2024-01-01 PROCEDURE — 99207 PR APP CREDIT; MD BILLING SHARED VISIT: CPT | Mod: GC | Performed by: INTERNAL MEDICINE

## 2024-01-01 PROCEDURE — 83615 LACTATE (LD) (LDH) ENZYME: CPT | Performed by: STUDENT IN AN ORGANIZED HEALTH CARE EDUCATION/TRAINING PROGRAM

## 2024-01-01 PROCEDURE — 85014 HEMATOCRIT: CPT | Performed by: STUDENT IN AN ORGANIZED HEALTH CARE EDUCATION/TRAINING PROGRAM

## 2024-01-01 PROCEDURE — 84145 PROCALCITONIN (PCT): CPT | Performed by: STUDENT IN AN ORGANIZED HEALTH CARE EDUCATION/TRAINING PROGRAM

## 2024-01-01 PROCEDURE — 99255 IP/OBS CONSLTJ NEW/EST HI 80: CPT | Performed by: PHYSICIAN ASSISTANT

## 2024-01-01 PROCEDURE — 87106 FUNGI IDENTIFICATION YEAST: CPT | Performed by: INTERNAL MEDICINE

## 2024-01-01 PROCEDURE — 83529 ASAY OF INTERLEUKIN-6 (IL-6): CPT | Performed by: STUDENT IN AN ORGANIZED HEALTH CARE EDUCATION/TRAINING PROGRAM

## 2024-01-01 PROCEDURE — 81001 URINALYSIS AUTO W/SCOPE: CPT | Performed by: PHYSICIAN ASSISTANT

## 2024-01-01 PROCEDURE — 86706 HEP B SURFACE ANTIBODY: CPT | Performed by: STUDENT IN AN ORGANIZED HEALTH CARE EDUCATION/TRAINING PROGRAM

## 2024-01-01 PROCEDURE — 96361 HYDRATE IV INFUSION ADD-ON: CPT

## 2024-01-01 PROCEDURE — 99207 PR APP CREDIT; MD BILLING SHARED VISIT: CPT | Performed by: STUDENT IN AN ORGANIZED HEALTH CARE EDUCATION/TRAINING PROGRAM

## 2024-01-01 PROCEDURE — 86704 HEP B CORE ANTIBODY TOTAL: CPT | Performed by: STUDENT IN AN ORGANIZED HEALTH CARE EDUCATION/TRAINING PROGRAM

## 2024-01-01 PROCEDURE — 82962 GLUCOSE BLOOD TEST: CPT

## 2024-01-01 PROCEDURE — 258N000003 HC RX IP 258 OP 636: Performed by: FAMILY MEDICINE

## 2024-01-01 PROCEDURE — 87633 RESP VIRUS 12-25 TARGETS: CPT

## 2024-01-01 PROCEDURE — 258N000001 HC RX 258: Performed by: INTERNAL MEDICINE

## 2024-01-01 PROCEDURE — 86200 CCP ANTIBODY: CPT | Performed by: STUDENT IN AN ORGANIZED HEALTH CARE EDUCATION/TRAINING PROGRAM

## 2024-01-01 PROCEDURE — 87637 SARSCOV2&INF A&B&RSV AMP PRB: CPT | Performed by: PHYSICIAN ASSISTANT

## 2024-01-01 PROCEDURE — 86037 ANCA TITER EACH ANTIBODY: CPT | Performed by: STUDENT IN AN ORGANIZED HEALTH CARE EDUCATION/TRAINING PROGRAM

## 2024-01-01 PROCEDURE — 82805 BLOOD GASES W/O2 SATURATION: CPT | Performed by: EMERGENCY MEDICINE

## 2024-01-01 PROCEDURE — 80048 BASIC METABOLIC PNL TOTAL CA: CPT | Performed by: FAMILY MEDICINE

## 2024-01-01 PROCEDURE — 83735 ASSAY OF MAGNESIUM: CPT | Performed by: FAMILY MEDICINE

## 2024-01-01 PROCEDURE — 85004 AUTOMATED DIFF WBC COUNT: CPT | Performed by: STUDENT IN AN ORGANIZED HEALTH CARE EDUCATION/TRAINING PROGRAM

## 2024-01-01 PROCEDURE — 250N000009 HC RX 250

## 2024-01-01 PROCEDURE — 99254 IP/OBS CNSLTJ NEW/EST MOD 60: CPT | Mod: GC | Performed by: STUDENT IN AN ORGANIZED HEALTH CARE EDUCATION/TRAINING PROGRAM

## 2024-01-01 PROCEDURE — 87641 MR-STAPH DNA AMP PROBE: CPT | Performed by: STUDENT IN AN ORGANIZED HEALTH CARE EDUCATION/TRAINING PROGRAM

## 2024-01-01 PROCEDURE — 82805 BLOOD GASES W/O2 SATURATION: CPT | Performed by: PHYSICIAN ASSISTANT

## 2024-01-01 RX ORDER — ACETYLCYSTEINE 100 MG/ML
4 SOLUTION ORAL; RESPIRATORY (INHALATION) EVERY 4 HOURS
Status: DISCONTINUED | OUTPATIENT
Start: 2024-01-01 | End: 2024-01-01

## 2024-01-01 RX ORDER — BUMETANIDE 0.5 MG/1
0.5 TABLET ORAL ONCE
Status: DISCONTINUED | OUTPATIENT
Start: 2024-01-01 | End: 2024-01-01

## 2024-01-01 RX ORDER — HEPARIN SODIUM 5000 [USP'U]/.5ML
5000 INJECTION, SOLUTION INTRAVENOUS; SUBCUTANEOUS EVERY 12 HOURS
Status: DISCONTINUED | OUTPATIENT
Start: 2024-01-01 | End: 2024-01-01

## 2024-01-01 RX ORDER — ACETAMINOPHEN 325 MG/1
650 TABLET ORAL EVERY 4 HOURS PRN
Status: DISCONTINUED | OUTPATIENT
Start: 2024-01-01 | End: 2024-01-01

## 2024-01-01 RX ORDER — FUROSEMIDE 40 MG
80 TABLET ORAL
Status: DISCONTINUED | OUTPATIENT
Start: 2024-01-01 | End: 2024-01-01

## 2024-01-01 RX ORDER — LEVOFLOXACIN 250 MG/1
750 TABLET, FILM COATED ORAL
Qty: 6 TABLET | Refills: 0 | Status: COMPLETED | OUTPATIENT
Start: 2024-01-01 | End: 2024-01-01

## 2024-01-01 RX ORDER — BUMETANIDE 0.25 MG/ML
1 INJECTION INTRAMUSCULAR; INTRAVENOUS 2 TIMES DAILY
Status: DISCONTINUED | OUTPATIENT
Start: 2024-01-01 | End: 2024-01-01

## 2024-01-01 RX ORDER — METOLAZONE 5 MG/1
5 TABLET ORAL DAILY
Qty: 10 TABLET | Refills: 0 | Status: SHIPPED | OUTPATIENT
Start: 2024-01-01

## 2024-01-01 RX ORDER — ACYCLOVIR 200 MG/1
3 CAPSULE ORAL ONCE
Status: COMPLETED | OUTPATIENT
Start: 2024-01-01 | End: 2024-01-01

## 2024-01-01 RX ORDER — AMOXICILLIN 250 MG
1 CAPSULE ORAL 2 TIMES DAILY PRN
Status: CANCELLED | OUTPATIENT
Start: 2024-01-01

## 2024-01-01 RX ORDER — METHYLPREDNISOLONE SODIUM SUCCINATE 125 MG/2ML
100 INJECTION, POWDER, LYOPHILIZED, FOR SOLUTION INTRAMUSCULAR; INTRAVENOUS DAILY
Status: DISCONTINUED | OUTPATIENT
Start: 2024-01-01 | End: 2024-01-01

## 2024-01-01 RX ORDER — ALBUTEROL SULFATE 90 UG/1
2 AEROSOL, METERED RESPIRATORY (INHALATION) EVERY 4 HOURS PRN
Status: DISCONTINUED | OUTPATIENT
Start: 2024-01-01 | End: 2024-03-29 | Stop reason: HOSPADM

## 2024-01-01 RX ORDER — PROCHLORPERAZINE MALEATE 5 MG
5 TABLET ORAL EVERY 6 HOURS PRN
Status: DISCONTINUED | OUTPATIENT
Start: 2024-01-01 | End: 2024-01-01 | Stop reason: HOSPADM

## 2024-01-01 RX ORDER — ONDANSETRON 2 MG/ML
4 INJECTION INTRAMUSCULAR; INTRAVENOUS EVERY 6 HOURS PRN
Status: DISCONTINUED | OUTPATIENT
Start: 2024-01-01 | End: 2024-01-01 | Stop reason: HOSPADM

## 2024-01-01 RX ORDER — FUROSEMIDE 10 MG/ML
60 INJECTION INTRAMUSCULAR; INTRAVENOUS DAILY
Status: DISCONTINUED | OUTPATIENT
Start: 2024-01-01 | End: 2024-01-01

## 2024-01-01 RX ORDER — PANTOPRAZOLE SODIUM 40 MG/1
40 TABLET, DELAYED RELEASE ORAL
Status: DISCONTINUED | OUTPATIENT
Start: 2024-01-01 | End: 2024-01-01

## 2024-01-01 RX ORDER — PROCHLORPERAZINE MALEATE 5 MG
5 TABLET ORAL EVERY 6 HOURS PRN
Status: DISCONTINUED | OUTPATIENT
Start: 2024-01-01 | End: 2024-03-29 | Stop reason: HOSPADM

## 2024-01-01 RX ORDER — PIPERACILLIN SODIUM, TAZOBACTAM SODIUM 4; .5 G/20ML; G/20ML
4.5 INJECTION, POWDER, LYOPHILIZED, FOR SOLUTION INTRAVENOUS EVERY 6 HOURS
Status: DISCONTINUED | OUTPATIENT
Start: 2024-01-01 | End: 2024-01-01

## 2024-01-01 RX ORDER — ALBUMIN (HUMAN) 12.5 G/50ML
25 SOLUTION INTRAVENOUS ONCE
Status: COMPLETED | OUTPATIENT
Start: 2024-01-01 | End: 2024-01-01

## 2024-01-01 RX ORDER — SULFAMETHOXAZOLE/TRIMETHOPRIM 800-160 MG
3 TABLET ORAL 3 TIMES DAILY
Status: DISCONTINUED | OUTPATIENT
Start: 2024-01-01 | End: 2024-03-29 | Stop reason: HOSPADM

## 2024-01-01 RX ORDER — POTASSIUM CHLORIDE 1500 MG/1
20 TABLET, EXTENDED RELEASE ORAL ONCE
Status: COMPLETED | OUTPATIENT
Start: 2024-01-01 | End: 2024-01-01

## 2024-01-01 RX ORDER — POTASSIUM CHLORIDE 1.5 G/1.58G
40 POWDER, FOR SOLUTION ORAL ONCE
Status: COMPLETED | OUTPATIENT
Start: 2024-01-01 | End: 2024-01-01

## 2024-01-01 RX ORDER — FLUOXETINE 10 MG/1
20 CAPSULE ORAL DAILY
Status: DISCONTINUED | OUTPATIENT
Start: 2024-01-01 | End: 2024-01-01

## 2024-01-01 RX ORDER — BARIUM SULFATE 400 MG/ML
SUSPENSION ORAL ONCE
Status: COMPLETED | OUTPATIENT
Start: 2024-01-01 | End: 2024-01-01

## 2024-01-01 RX ORDER — WARFARIN SODIUM 2.5 MG/1
2.5 TABLET ORAL
Status: COMPLETED | OUTPATIENT
Start: 2024-01-01 | End: 2024-01-01

## 2024-01-01 RX ORDER — SODIUM CHLORIDE 9 MG/ML
INJECTION, SOLUTION INTRAVENOUS CONTINUOUS
Status: CANCELLED | OUTPATIENT
Start: 2024-01-01

## 2024-01-01 RX ORDER — LEVOFLOXACIN 250 MG/1
500 TABLET, FILM COATED ORAL DAILY
Status: DISCONTINUED | OUTPATIENT
Start: 2024-01-01 | End: 2024-01-01

## 2024-01-01 RX ORDER — WARFARIN SODIUM 2 MG/1
2 TABLET ORAL
Status: COMPLETED | OUTPATIENT
Start: 2024-01-01 | End: 2024-01-01

## 2024-01-01 RX ORDER — PREDNISONE 10 MG/1
10 TABLET ORAL DAILY
Status: DISCONTINUED | OUTPATIENT
Start: 2024-01-01 | End: 2024-01-01

## 2024-01-01 RX ORDER — PROCHLORPERAZINE 25 MG
12.5 SUPPOSITORY, RECTAL RECTAL EVERY 12 HOURS PRN
Status: CANCELLED | OUTPATIENT
Start: 2024-01-01

## 2024-01-01 RX ORDER — AMOXICILLIN 250 MG
2 CAPSULE ORAL 2 TIMES DAILY PRN
Status: DISCONTINUED | OUTPATIENT
Start: 2024-01-01 | End: 2024-01-01 | Stop reason: HOSPADM

## 2024-01-01 RX ORDER — WARFARIN SODIUM 1 MG/1
1 TABLET ORAL
Status: COMPLETED | OUTPATIENT
Start: 2024-01-01 | End: 2024-01-01

## 2024-01-01 RX ORDER — CEFTRIAXONE 2 G/1
2 INJECTION, POWDER, FOR SOLUTION INTRAMUSCULAR; INTRAVENOUS EVERY 24 HOURS
Status: DISCONTINUED | OUTPATIENT
Start: 2024-01-01 | End: 2024-01-01

## 2024-01-01 RX ORDER — WARFARIN SODIUM 2.5 MG/1
TABLET ORAL
Qty: 110 TABLET | Refills: 0 | Status: SHIPPED | OUTPATIENT
Start: 2024-01-01

## 2024-01-01 RX ORDER — POTASSIUM CHLORIDE 750 MG/1
20 TABLET, EXTENDED RELEASE ORAL
Status: CANCELLED | OUTPATIENT
Start: 2024-01-01

## 2024-01-01 RX ORDER — POTASSIUM CHLORIDE 7.45 MG/ML
10 INJECTION INTRAVENOUS ONCE
Status: COMPLETED | OUTPATIENT
Start: 2024-01-01 | End: 2024-01-01

## 2024-01-01 RX ORDER — METHYLPREDNISOLONE SODIUM SUCCINATE 40 MG/ML
40 INJECTION, POWDER, LYOPHILIZED, FOR SOLUTION INTRAMUSCULAR; INTRAVENOUS EVERY 12 HOURS
Qty: 8 ML | Refills: 0 | Status: DISCONTINUED | OUTPATIENT
Start: 2024-01-01 | End: 2024-01-01

## 2024-01-01 RX ORDER — ATORVASTATIN CALCIUM 40 MG/1
40 TABLET, FILM COATED ORAL EVERY EVENING
Status: DISCONTINUED | OUTPATIENT
Start: 2024-01-01 | End: 2024-01-01

## 2024-01-01 RX ORDER — NITROGLYCERIN 0.4 MG/1
0.4 TABLET SUBLINGUAL EVERY 5 MIN PRN
Status: DISCONTINUED | OUTPATIENT
Start: 2024-01-01 | End: 2024-03-29 | Stop reason: HOSPADM

## 2024-01-01 RX ORDER — PHYTONADIONE 5 MG/1
5 TABLET ORAL ONCE
Status: DISCONTINUED | OUTPATIENT
Start: 2024-01-01 | End: 2024-01-01

## 2024-01-01 RX ORDER — PREDNISONE 20 MG/1
40 TABLET ORAL DAILY
Status: COMPLETED | OUTPATIENT
Start: 2024-01-01 | End: 2024-01-01

## 2024-01-01 RX ORDER — FUROSEMIDE 10 MG/ML
40 INJECTION INTRAMUSCULAR; INTRAVENOUS 2 TIMES DAILY
Status: DISCONTINUED | OUTPATIENT
Start: 2024-01-01 | End: 2024-01-01

## 2024-01-01 RX ORDER — DOXYCYCLINE 100 MG/1
100 CAPSULE ORAL EVERY 12 HOURS SCHEDULED
Status: DISCONTINUED | OUTPATIENT
Start: 2024-01-01 | End: 2024-01-01

## 2024-01-01 RX ORDER — ACETAZOLAMIDE 250 MG/1
250 TABLET ORAL DAILY
Status: CANCELLED | OUTPATIENT
Start: 2024-01-01

## 2024-01-01 RX ORDER — FLUTICASONE FUROATE AND VILANTEROL 200; 25 UG/1; UG/1
1 POWDER RESPIRATORY (INHALATION) DAILY
Status: DISCONTINUED | OUTPATIENT
Start: 2024-01-01 | End: 2024-03-29 | Stop reason: HOSPADM

## 2024-01-01 RX ORDER — ONDANSETRON 4 MG/1
4 TABLET, ORALLY DISINTEGRATING ORAL EVERY 6 HOURS PRN
Status: DISCONTINUED | OUTPATIENT
Start: 2024-01-01 | End: 2024-01-01 | Stop reason: HOSPADM

## 2024-01-01 RX ORDER — ACETAZOLAMIDE 250 MG/1
250 TABLET ORAL DAILY
Status: DISCONTINUED | OUTPATIENT
Start: 2024-01-01 | End: 2024-01-01

## 2024-01-01 RX ORDER — ASPIRIN 81 MG/1
81 TABLET ORAL DAILY
Status: DISCONTINUED | OUTPATIENT
Start: 2024-01-01 | End: 2024-01-01

## 2024-01-01 RX ORDER — ATORVASTATIN CALCIUM 40 MG/1
40 TABLET, FILM COATED ORAL EVERY EVENING
Status: CANCELLED | OUTPATIENT
Start: 2024-01-01

## 2024-01-01 RX ORDER — METOPROLOL SUCCINATE 25 MG/1
25 TABLET, EXTENDED RELEASE ORAL DAILY
Status: CANCELLED | OUTPATIENT
Start: 2024-01-01

## 2024-01-01 RX ORDER — VANCOMYCIN HYDROCHLORIDE 1 G/200ML
1000 INJECTION, SOLUTION INTRAVENOUS EVERY 24 HOURS
Status: DISCONTINUED | OUTPATIENT
Start: 2024-01-01 | End: 2024-01-01

## 2024-01-01 RX ORDER — BUMETANIDE 0.25 MG/ML
2 INJECTION INTRAMUSCULAR; INTRAVENOUS ONCE
Status: COMPLETED | OUTPATIENT
Start: 2024-01-01 | End: 2024-01-01

## 2024-01-01 RX ORDER — HEPARIN SODIUM 10000 [USP'U]/100ML
0-5000 INJECTION, SOLUTION INTRAVENOUS CONTINUOUS
Status: DISCONTINUED | OUTPATIENT
Start: 2024-01-01 | End: 2024-01-01

## 2024-01-01 RX ORDER — POTASSIUM CHLORIDE 750 MG/1
40 TABLET, EXTENDED RELEASE ORAL ONCE
Status: COMPLETED | OUTPATIENT
Start: 2024-01-01 | End: 2024-01-01

## 2024-01-01 RX ORDER — BUMETANIDE 1 MG/1
1 TABLET ORAL ONCE
Status: COMPLETED | OUTPATIENT
Start: 2024-01-01 | End: 2024-01-01

## 2024-01-01 RX ORDER — FLUTICASONE FUROATE AND VILANTEROL 200; 25 UG/1; UG/1
1 POWDER RESPIRATORY (INHALATION) EVERY EVENING
Status: DISCONTINUED | OUTPATIENT
Start: 2024-01-01 | End: 2024-01-01 | Stop reason: HOSPADM

## 2024-01-01 RX ORDER — METHYLPREDNISOLONE SODIUM SUCCINATE 125 MG/2ML
125 INJECTION, POWDER, LYOPHILIZED, FOR SOLUTION INTRAMUSCULAR; INTRAVENOUS ONCE
Qty: 2 ML | Refills: 0 | Status: COMPLETED | OUTPATIENT
Start: 2024-01-01 | End: 2024-01-01

## 2024-01-01 RX ORDER — VORICONAZOLE 200 MG/1
200 TABLET, FILM COATED ORAL EVERY 12 HOURS SCHEDULED
Qty: 344 TABLET | Refills: 0 | Status: DISCONTINUED | OUTPATIENT
Start: 2024-04-02 | End: 2024-01-01

## 2024-01-01 RX ORDER — METOPROLOL SUCCINATE 25 MG/1
25 TABLET, EXTENDED RELEASE ORAL DAILY
Status: DISCONTINUED | OUTPATIENT
Start: 2024-01-01 | End: 2024-01-01

## 2024-01-01 RX ORDER — METHYLPREDNISOLONE SODIUM SUCCINATE 125 MG/2ML
62.5 INJECTION, POWDER, LYOPHILIZED, FOR SOLUTION INTRAMUSCULAR; INTRAVENOUS EVERY 12 HOURS
Status: DISCONTINUED | OUTPATIENT
Start: 2024-01-01 | End: 2024-01-01

## 2024-01-01 RX ORDER — NICOTINE POLACRILEX 4 MG
15-30 LOZENGE BUCCAL
Status: DISCONTINUED | OUTPATIENT
Start: 2024-01-01 | End: 2024-03-29 | Stop reason: HOSPADM

## 2024-01-01 RX ORDER — BUMETANIDE 0.25 MG/ML
1 INJECTION INTRAMUSCULAR; INTRAVENOUS ONCE
Status: COMPLETED | OUTPATIENT
Start: 2024-01-01 | End: 2024-01-01

## 2024-01-01 RX ORDER — ASPIRIN 81 MG/1
81 TABLET ORAL DAILY
Status: CANCELLED | OUTPATIENT
Start: 2024-01-01

## 2024-01-01 RX ORDER — ERGOCALCIFEROL 1.25 MG/1
50000 CAPSULE, LIQUID FILLED ORAL
Status: DISCONTINUED | OUTPATIENT
Start: 2024-01-01 | End: 2024-03-29 | Stop reason: HOSPADM

## 2024-01-01 RX ORDER — ONDANSETRON 4 MG/1
4 TABLET, ORALLY DISINTEGRATING ORAL EVERY 6 HOURS PRN
Status: CANCELLED | OUTPATIENT
Start: 2024-01-01

## 2024-01-01 RX ORDER — ACETAZOLAMIDE 125 MG/1
125 TABLET ORAL DAILY
Status: DISCONTINUED | OUTPATIENT
Start: 2024-01-01 | End: 2024-03-29 | Stop reason: HOSPADM

## 2024-01-01 RX ORDER — POLYETHYLENE GLYCOL 3350 17 G/17G
17 POWDER, FOR SOLUTION ORAL DAILY
Status: DISCONTINUED | OUTPATIENT
Start: 2024-01-01 | End: 2024-01-01 | Stop reason: HOSPADM

## 2024-01-01 RX ORDER — PREDNISONE 20 MG/1
20 TABLET ORAL DAILY
Status: DISCONTINUED | OUTPATIENT
Start: 2024-01-01 | End: 2024-01-01

## 2024-01-01 RX ORDER — DEXTROSE MONOHYDRATE 25 G/50ML
25-50 INJECTION, SOLUTION INTRAVENOUS
Status: DISCONTINUED | OUTPATIENT
Start: 2024-01-01 | End: 2024-03-29 | Stop reason: HOSPADM

## 2024-01-01 RX ORDER — METHYLPREDNISOLONE SODIUM SUCCINATE 125 MG/2ML
125 INJECTION, POWDER, LYOPHILIZED, FOR SOLUTION INTRAMUSCULAR; INTRAVENOUS EVERY 8 HOURS
Status: COMPLETED | OUTPATIENT
Start: 2024-01-01 | End: 2024-01-01

## 2024-01-01 RX ORDER — POLYETHYLENE GLYCOL 3350 17 G/17G
17 POWDER, FOR SOLUTION ORAL DAILY
Status: DISCONTINUED | OUTPATIENT
Start: 2024-01-01 | End: 2024-01-01

## 2024-01-01 RX ORDER — LIDOCAINE 40 MG/G
CREAM TOPICAL
Status: DISCONTINUED | OUTPATIENT
Start: 2024-01-01 | End: 2024-01-01 | Stop reason: HOSPADM

## 2024-01-01 RX ORDER — ONDANSETRON 4 MG/1
4 TABLET, ORALLY DISINTEGRATING ORAL EVERY 6 HOURS PRN
Status: DISCONTINUED | OUTPATIENT
Start: 2024-01-01 | End: 2024-03-29 | Stop reason: HOSPADM

## 2024-01-01 RX ORDER — ACETAZOLAMIDE 125 MG/1
125 TABLET ORAL
Status: DISCONTINUED | OUTPATIENT
Start: 2024-01-01 | End: 2024-01-01

## 2024-01-01 RX ORDER — LIDOCAINE 40 MG/G
CREAM TOPICAL
Status: DISCONTINUED | OUTPATIENT
Start: 2024-01-01 | End: 2024-03-29 | Stop reason: HOSPADM

## 2024-01-01 RX ORDER — BUMETANIDE 0.5 MG/1
0.5 TABLET ORAL
Status: DISCONTINUED | OUTPATIENT
Start: 2024-01-01 | End: 2024-01-01

## 2024-01-01 RX ORDER — CARBOXYMETHYLCELLULOSE SODIUM 5 MG/ML
1 SOLUTION/ DROPS OPHTHALMIC
Status: DISCONTINUED | OUTPATIENT
Start: 2024-01-01 | End: 2024-03-29 | Stop reason: HOSPADM

## 2024-01-01 RX ORDER — NYSTATIN 100000/ML
500000 SUSPENSION, ORAL (FINAL DOSE FORM) ORAL 4 TIMES DAILY
Status: COMPLETED | OUTPATIENT
Start: 2024-01-01 | End: 2024-01-01

## 2024-01-01 RX ORDER — ACETAMINOPHEN 325 MG/1
650 TABLET ORAL 2 TIMES DAILY PRN
Status: DISCONTINUED | OUTPATIENT
Start: 2024-01-01 | End: 2024-01-01 | Stop reason: HOSPADM

## 2024-01-01 RX ORDER — ALBUTEROL SULFATE 5 MG/ML
2.5 SOLUTION RESPIRATORY (INHALATION) EVERY 4 HOURS
Status: DISCONTINUED | OUTPATIENT
Start: 2024-01-01 | End: 2024-01-01

## 2024-01-01 RX ORDER — HEPARIN SODIUM 5000 [USP'U]/.5ML
5000 INJECTION, SOLUTION INTRAVENOUS; SUBCUTANEOUS EVERY 8 HOURS
Status: DISCONTINUED | OUTPATIENT
Start: 2024-01-01 | End: 2024-01-01

## 2024-01-01 RX ORDER — ALBUTEROL SULFATE 90 UG/1
2 AEROSOL, METERED RESPIRATORY (INHALATION) EVERY 4 HOURS PRN
Status: CANCELLED | OUTPATIENT
Start: 2024-01-01

## 2024-01-01 RX ORDER — BUMETANIDE 1 MG/1
1 TABLET ORAL DAILY
Status: DISCONTINUED | OUTPATIENT
Start: 2024-01-01 | End: 2024-01-01

## 2024-01-01 RX ORDER — METHYLPREDNISOLONE SODIUM SUCCINATE 125 MG/2ML
62.5 INJECTION, POWDER, LYOPHILIZED, FOR SOLUTION INTRAMUSCULAR; INTRAVENOUS DAILY
Status: DISCONTINUED | OUTPATIENT
Start: 2024-01-01 | End: 2024-01-01

## 2024-01-01 RX ORDER — METHYLPREDNISOLONE SODIUM SUCCINATE 125 MG/2ML
100 INJECTION, POWDER, LYOPHILIZED, FOR SOLUTION INTRAMUSCULAR; INTRAVENOUS ONCE
Qty: 2 ML | Refills: 0 | Status: COMPLETED | OUTPATIENT
Start: 2024-01-01 | End: 2024-01-01

## 2024-01-01 RX ORDER — POLYETHYLENE GLYCOL 3350 17 G/17G
17 POWDER, FOR SOLUTION ORAL 2 TIMES DAILY
Status: DISCONTINUED | OUTPATIENT
Start: 2024-01-01 | End: 2024-03-29 | Stop reason: HOSPADM

## 2024-01-01 RX ORDER — WARFARIN SODIUM 2.5 MG/1
2.5 TABLET ORAL DAILY
Qty: 90 TABLET | Refills: 3 | Status: SHIPPED | OUTPATIENT
Start: 2024-01-01 | End: 2024-01-01

## 2024-01-01 RX ORDER — CALCIUM CARBONATE 500 MG/1
1000 TABLET, CHEWABLE ORAL 4 TIMES DAILY PRN
Status: DISCONTINUED | OUTPATIENT
Start: 2024-01-01 | End: 2024-01-01 | Stop reason: HOSPADM

## 2024-01-01 RX ORDER — AMOXICILLIN 250 MG
2 CAPSULE ORAL 2 TIMES DAILY PRN
Status: CANCELLED | OUTPATIENT
Start: 2024-01-01

## 2024-01-01 RX ORDER — ALBUTEROL SULFATE 90 UG/1
2 AEROSOL, METERED RESPIRATORY (INHALATION) EVERY 4 HOURS PRN
Qty: 18 G | Refills: 2 | Status: SHIPPED | OUTPATIENT
Start: 2024-01-01

## 2024-01-01 RX ORDER — METHYLPREDNISOLONE SODIUM SUCCINATE 125 MG/2ML
100 INJECTION, POWDER, LYOPHILIZED, FOR SOLUTION INTRAMUSCULAR; INTRAVENOUS DAILY
Status: COMPLETED | OUTPATIENT
Start: 2024-01-01 | End: 2024-01-01

## 2024-01-01 RX ORDER — FUROSEMIDE 40 MG
40 TABLET ORAL ONCE
Status: COMPLETED | OUTPATIENT
Start: 2024-01-01 | End: 2024-01-01

## 2024-01-01 RX ORDER — ACETAMINOPHEN 325 MG/1
650 TABLET ORAL 2 TIMES DAILY PRN
Status: DISCONTINUED | OUTPATIENT
Start: 2024-01-01 | End: 2024-03-29 | Stop reason: HOSPADM

## 2024-01-01 RX ORDER — VORICONAZOLE 200 MG/1
200 TABLET, FILM COATED ORAL 2 TIMES DAILY
Status: DISCONTINUED | OUTPATIENT
Start: 2024-01-01 | End: 2024-03-29 | Stop reason: HOSPADM

## 2024-01-01 RX ORDER — FUROSEMIDE 10 MG/ML
60 INJECTION INTRAMUSCULAR; INTRAVENOUS 2 TIMES DAILY
Status: DISCONTINUED | OUTPATIENT
Start: 2024-01-01 | End: 2024-01-01

## 2024-01-01 RX ORDER — AMOXICILLIN 250 MG
1 CAPSULE ORAL 2 TIMES DAILY PRN
Status: DISCONTINUED | OUTPATIENT
Start: 2024-01-01 | End: 2024-03-29 | Stop reason: HOSPADM

## 2024-01-01 RX ORDER — NICOTINE POLACRILEX 4 MG
15-30 LOZENGE BUCCAL
Status: CANCELLED | OUTPATIENT
Start: 2024-01-01

## 2024-01-01 RX ORDER — SULFAMETHOXAZOLE/TRIMETHOPRIM 800-160 MG
1 TABLET ORAL 2 TIMES DAILY
Status: DISCONTINUED | OUTPATIENT
Start: 2024-01-01 | End: 2024-01-01

## 2024-01-01 RX ORDER — POTASSIUM CHLORIDE 750 MG/1
40 TABLET, EXTENDED RELEASE ORAL
Status: CANCELLED | OUTPATIENT
Start: 2024-01-01

## 2024-01-01 RX ORDER — PROCHLORPERAZINE 25 MG
12.5 SUPPOSITORY, RECTAL RECTAL EVERY 12 HOURS PRN
Status: DISCONTINUED | OUTPATIENT
Start: 2024-01-01 | End: 2024-01-01 | Stop reason: HOSPADM

## 2024-01-01 RX ORDER — SPIRONOLACTONE 25 MG
12.5 TABLET ORAL DAILY
Status: DISCONTINUED | OUTPATIENT
Start: 2024-01-01 | End: 2024-03-29 | Stop reason: HOSPADM

## 2024-01-01 RX ORDER — AMOXICILLIN 250 MG
1 CAPSULE ORAL 2 TIMES DAILY PRN
Status: DISCONTINUED | OUTPATIENT
Start: 2024-01-01 | End: 2024-01-01 | Stop reason: HOSPADM

## 2024-01-01 RX ORDER — NYSTATIN 100000/ML
1000000 SUSPENSION, ORAL (FINAL DOSE FORM) ORAL 4 TIMES DAILY
Status: DISCONTINUED | OUTPATIENT
Start: 2024-01-01 | End: 2024-03-29 | Stop reason: HOSPADM

## 2024-01-01 RX ORDER — NITROGLYCERIN 0.4 MG/1
0.4 TABLET SUBLINGUAL EVERY 5 MIN PRN
Status: DISCONTINUED | OUTPATIENT
Start: 2024-01-01 | End: 2024-01-01 | Stop reason: HOSPADM

## 2024-01-01 RX ORDER — POLYETHYLENE GLYCOL 3350 17 G/17G
17 POWDER, FOR SOLUTION ORAL DAILY
Status: CANCELLED | OUTPATIENT
Start: 2024-01-01

## 2024-01-01 RX ORDER — LIDOCAINE 40 MG/G
CREAM TOPICAL
Status: CANCELLED | OUTPATIENT
Start: 2024-01-01

## 2024-01-01 RX ORDER — ASCORBIC ACID 500 MG
1000 TABLET ORAL DAILY
Status: DISCONTINUED | OUTPATIENT
Start: 2024-01-01 | End: 2024-01-01

## 2024-01-01 RX ORDER — CARBOXYMETHYLCELLULOSE SODIUM 5 MG/ML
1 SOLUTION/ DROPS OPHTHALMIC
Status: DISCONTINUED | OUTPATIENT
Start: 2024-01-01 | End: 2024-01-01

## 2024-01-01 RX ORDER — POLYETHYLENE GLYCOL 3350 17 G/17G
17 POWDER, FOR SOLUTION ORAL DAILY PRN
Status: DISCONTINUED | OUTPATIENT
Start: 2024-01-01 | End: 2024-03-29 | Stop reason: HOSPADM

## 2024-01-01 RX ORDER — DOXYCYCLINE 100 MG/1
100 CAPSULE ORAL 2 TIMES DAILY
Qty: 10 CAPSULE | Refills: 0 | Status: SHIPPED | OUTPATIENT
Start: 2024-01-01 | End: 2024-01-01

## 2024-01-01 RX ORDER — BUMETANIDE 0.5 MG/1
0.5 TABLET ORAL ONCE
Status: COMPLETED | OUTPATIENT
Start: 2024-01-01 | End: 2024-01-01

## 2024-01-01 RX ORDER — POTASSIUM CHLORIDE 1500 MG/1
80 TABLET, EXTENDED RELEASE ORAL DAILY
Qty: 60 TABLET | Refills: 0 | Status: SHIPPED | OUTPATIENT
Start: 2024-01-01

## 2024-01-01 RX ORDER — PREDNISONE 5 MG/1
5 TABLET ORAL DAILY
Status: DISCONTINUED | OUTPATIENT
Start: 2024-01-01 | End: 2024-01-01

## 2024-01-01 RX ORDER — BUMETANIDE 2 MG/1
2 TABLET ORAL
Status: DISCONTINUED | OUTPATIENT
Start: 2024-01-01 | End: 2024-01-01

## 2024-01-01 RX ORDER — NYSTATIN 100000/ML
500000 SUSPENSION, ORAL (FINAL DOSE FORM) ORAL 4 TIMES DAILY
Status: DISCONTINUED | OUTPATIENT
Start: 2024-01-01 | End: 2024-01-01

## 2024-01-01 RX ORDER — ALBUTEROL SULFATE 90 UG/1
2 AEROSOL, METERED RESPIRATORY (INHALATION) EVERY 4 HOURS PRN
Status: DISCONTINUED | OUTPATIENT
Start: 2024-01-01 | End: 2024-01-01 | Stop reason: HOSPADM

## 2024-01-01 RX ORDER — ALBUTEROL SULFATE 90 UG/1
2 AEROSOL, METERED RESPIRATORY (INHALATION) EVERY 4 HOURS PRN
Status: DISCONTINUED | OUTPATIENT
Start: 2024-01-01 | End: 2024-01-01

## 2024-01-01 RX ORDER — NITROGLYCERIN 0.4 MG/1
0.4 TABLET SUBLINGUAL EVERY 5 MIN PRN
Status: CANCELLED | OUTPATIENT
Start: 2024-01-01

## 2024-01-01 RX ORDER — POTASSIUM CHLORIDE 7.45 MG/ML
10 INJECTION INTRAVENOUS
Qty: 400 ML | Refills: 0 | Status: COMPLETED | OUTPATIENT
Start: 2024-01-01 | End: 2024-01-01

## 2024-01-01 RX ORDER — ASPIRIN 81 MG/1
81 TABLET ORAL DAILY
Status: DISCONTINUED | OUTPATIENT
Start: 2024-01-01 | End: 2024-01-01 | Stop reason: HOSPADM

## 2024-01-01 RX ORDER — FUROSEMIDE 10 MG/ML
20 INJECTION INTRAMUSCULAR; INTRAVENOUS DAILY
Status: DISCONTINUED | OUTPATIENT
Start: 2024-01-01 | End: 2024-01-01

## 2024-01-01 RX ORDER — PREDNISONE 2.5 MG/1
2.5 TABLET ORAL DAILY
Status: DISCONTINUED | OUTPATIENT
Start: 2024-01-01 | End: 2024-01-01

## 2024-01-01 RX ORDER — DEXTROSE MONOHYDRATE 25 G/50ML
25-50 INJECTION, SOLUTION INTRAVENOUS
Status: CANCELLED | OUTPATIENT
Start: 2024-01-01

## 2024-01-01 RX ORDER — ACETAZOLAMIDE 125 MG/1
125 TABLET ORAL DAILY
Status: DISCONTINUED | OUTPATIENT
Start: 2024-01-01 | End: 2024-01-01

## 2024-01-01 RX ORDER — IPRATROPIUM BROMIDE AND ALBUTEROL SULFATE 2.5; .5 MG/3ML; MG/3ML
3 SOLUTION RESPIRATORY (INHALATION)
Status: DISCONTINUED | OUTPATIENT
Start: 2024-01-01 | End: 2024-01-01

## 2024-01-01 RX ORDER — PANTOPRAZOLE SODIUM 40 MG/1
40 TABLET, DELAYED RELEASE ORAL 2 TIMES DAILY
Status: DISCONTINUED | OUTPATIENT
Start: 2024-01-01 | End: 2024-03-29 | Stop reason: HOSPADM

## 2024-01-01 RX ORDER — PROCHLORPERAZINE 25 MG
12.5 SUPPOSITORY, RECTAL RECTAL EVERY 12 HOURS PRN
Status: DISCONTINUED | OUTPATIENT
Start: 2024-01-01 | End: 2024-03-29 | Stop reason: HOSPADM

## 2024-01-01 RX ORDER — ATORVASTATIN CALCIUM 40 MG/1
40 TABLET, FILM COATED ORAL EVERY EVENING
Status: DISCONTINUED | OUTPATIENT
Start: 2024-01-01 | End: 2024-01-01 | Stop reason: HOSPADM

## 2024-01-01 RX ORDER — PREDNISONE 20 MG/1
40 TABLET ORAL DAILY
Status: DISCONTINUED | OUTPATIENT
Start: 2024-01-01 | End: 2024-01-01

## 2024-01-01 RX ORDER — CALCIUM CARBONATE 500 MG/1
1000 TABLET, CHEWABLE ORAL 4 TIMES DAILY PRN
Status: CANCELLED | OUTPATIENT
Start: 2024-01-01

## 2024-01-01 RX ORDER — FLUTICASONE FUROATE AND VILANTEROL 200; 25 UG/1; UG/1
1 POWDER RESPIRATORY (INHALATION) EVERY EVENING
Status: DISCONTINUED | OUTPATIENT
Start: 2024-01-01 | End: 2024-01-01

## 2024-01-01 RX ORDER — FLUTICASONE FUROATE AND VILANTEROL 200; 25 UG/1; UG/1
1 POWDER RESPIRATORY (INHALATION) EVERY EVENING
Status: CANCELLED | OUTPATIENT
Start: 2024-01-01

## 2024-01-01 RX ORDER — IPRATROPIUM BROMIDE AND ALBUTEROL SULFATE 2.5; .5 MG/3ML; MG/3ML
3 SOLUTION RESPIRATORY (INHALATION) ONCE
Status: COMPLETED | OUTPATIENT
Start: 2024-01-01 | End: 2024-01-01

## 2024-01-01 RX ORDER — FUROSEMIDE 10 MG/ML
60 INJECTION INTRAMUSCULAR; INTRAVENOUS 2 TIMES DAILY
Status: CANCELLED | OUTPATIENT
Start: 2024-01-01

## 2024-01-01 RX ORDER — ALBUTEROL SULFATE 0.83 MG/ML
2.5 SOLUTION RESPIRATORY (INHALATION) EVERY 4 HOURS
Status: DISCONTINUED | OUTPATIENT
Start: 2024-01-01 | End: 2024-03-29 | Stop reason: HOSPADM

## 2024-01-01 RX ORDER — AMPICILLIN AND SULBACTAM 2; 1 G/1; G/1
3 INJECTION, POWDER, FOR SOLUTION INTRAMUSCULAR; INTRAVENOUS EVERY 6 HOURS
Qty: 60 G | Refills: 0 | Status: DISCONTINUED | OUTPATIENT
Start: 2024-01-01 | End: 2024-03-29 | Stop reason: HOSPADM

## 2024-01-01 RX ORDER — ONDANSETRON 2 MG/ML
4 INJECTION INTRAMUSCULAR; INTRAVENOUS EVERY 6 HOURS PRN
Status: DISCONTINUED | OUTPATIENT
Start: 2024-01-01 | End: 2024-03-29 | Stop reason: HOSPADM

## 2024-01-01 RX ORDER — AMOXICILLIN 250 MG
2 CAPSULE ORAL 2 TIMES DAILY PRN
Status: DISCONTINUED | OUTPATIENT
Start: 2024-01-01 | End: 2024-03-29 | Stop reason: HOSPADM

## 2024-01-01 RX ORDER — POTASSIUM CHLORIDE 1500 MG/1
40 TABLET, EXTENDED RELEASE ORAL ONCE
Status: COMPLETED | OUTPATIENT
Start: 2024-01-01 | End: 2024-01-01

## 2024-01-01 RX ORDER — ALBUTEROL SULFATE 0.83 MG/ML
SOLUTION RESPIRATORY (INHALATION)
Status: COMPLETED
Start: 2024-01-01 | End: 2024-01-01

## 2024-01-01 RX ORDER — PROCHLORPERAZINE MALEATE 5 MG
5 TABLET ORAL EVERY 6 HOURS PRN
Status: CANCELLED | OUTPATIENT
Start: 2024-01-01

## 2024-01-01 RX ORDER — PREDNISONE 10 MG/1
10 TABLET ORAL DAILY
Status: DISCONTINUED | OUTPATIENT
Start: 2024-04-01 | End: 2024-01-01

## 2024-01-01 RX ORDER — POTASSIUM CHLORIDE 20MEQ/15ML
20 LIQUID (ML) ORAL ONCE
Status: COMPLETED | OUTPATIENT
Start: 2024-01-01 | End: 2024-01-01

## 2024-01-01 RX ORDER — POTASSIUM CHLORIDE 1500 MG/1
40 TABLET, EXTENDED RELEASE ORAL ONCE
Qty: 2 TABLET | Refills: 0 | Status: COMPLETED | OUTPATIENT
Start: 2024-01-01 | End: 2024-01-01

## 2024-01-01 RX ORDER — BUMETANIDE 1 MG/1
1 TABLET ORAL
Status: DISCONTINUED | OUTPATIENT
Start: 2024-01-01 | End: 2024-01-01

## 2024-01-01 RX ORDER — PREDNISONE 20 MG/1
40 TABLET ORAL DAILY
Qty: 10 TABLET | Refills: 0 | Status: SHIPPED | OUTPATIENT
Start: 2024-01-01 | End: 2024-01-01

## 2024-01-01 RX ORDER — ONDANSETRON 2 MG/ML
4 INJECTION INTRAMUSCULAR; INTRAVENOUS EVERY 6 HOURS PRN
Status: CANCELLED | OUTPATIENT
Start: 2024-01-01

## 2024-01-01 RX ORDER — ACETYLCYSTEINE 100 MG/ML
2 SOLUTION ORAL; RESPIRATORY (INHALATION) EVERY 4 HOURS
Status: DISCONTINUED | OUTPATIENT
Start: 2024-01-01 | End: 2024-03-29 | Stop reason: HOSPADM

## 2024-01-01 RX ORDER — METHYLPREDNISOLONE SODIUM SUCCINATE 125 MG/2ML
85 INJECTION, POWDER, LYOPHILIZED, FOR SOLUTION INTRAMUSCULAR; INTRAVENOUS ONCE
Status: COMPLETED | OUTPATIENT
Start: 2024-01-01 | End: 2024-01-01

## 2024-01-01 RX ORDER — METOPROLOL SUCCINATE 25 MG/1
25 TABLET, EXTENDED RELEASE ORAL DAILY
Status: DISCONTINUED | OUTPATIENT
Start: 2024-01-01 | End: 2024-01-01 | Stop reason: HOSPADM

## 2024-01-01 RX ORDER — ASPIRIN 81 MG/1
81 TABLET, CHEWABLE ORAL DAILY
Status: DISCONTINUED | OUTPATIENT
Start: 2024-01-01 | End: 2024-03-29 | Stop reason: HOSPADM

## 2024-01-01 RX ORDER — CALCIUM CARBONATE 500 MG/1
1000 TABLET, CHEWABLE ORAL 4 TIMES DAILY PRN
Status: DISCONTINUED | OUTPATIENT
Start: 2024-01-01 | End: 2024-03-29 | Stop reason: HOSPADM

## 2024-01-01 RX ORDER — ACETAMINOPHEN 325 MG/1
650 TABLET ORAL 2 TIMES DAILY PRN
Status: CANCELLED | OUTPATIENT
Start: 2024-01-01

## 2024-01-01 RX ADMIN — THIAMINE HCL TAB 100 MG 100 MG: 100 TAB at 08:02

## 2024-01-01 RX ADMIN — ASPIRIN 81 MG CHEWABLE TABLET 81 MG: 81 TABLET CHEWABLE at 09:50

## 2024-01-01 RX ADMIN — ACETYLCYSTEINE 4 ML: 100 SOLUTION ORAL; RESPIRATORY (INHALATION) at 16:12

## 2024-01-01 RX ADMIN — KIT FOR THE PREPARATION OF TECHNETIUM TC 99M ALBUMIN AGGREGATED 7 MILLICURIE: 2.5 INJECTION, POWDER, FOR SOLUTION INTRAVENOUS at 14:55

## 2024-01-01 RX ADMIN — ATORVASTATIN CALCIUM 40 MG: 40 TABLET, FILM COATED ORAL at 20:12

## 2024-01-01 RX ADMIN — FLUTICASONE FUROATE AND VILANTEROL TRIFENATATE 1 PUFF: 200; 25 POWDER RESPIRATORY (INHALATION) at 07:47

## 2024-01-01 RX ADMIN — ERGOCALCIFEROL 50000 UNITS: 1.25 CAPSULE, LIQUID FILLED ORAL at 09:27

## 2024-01-01 RX ADMIN — ERGOCALCIFEROL 50000 UNITS: 1.25 CAPSULE, LIQUID FILLED ORAL at 08:31

## 2024-01-01 RX ADMIN — EMPAGLIFLOZIN 10 MG: 10 TABLET, FILM COATED ORAL at 09:29

## 2024-01-01 RX ADMIN — ASPIRIN 81 MG CHEWABLE TABLET 81 MG: 81 TABLET CHEWABLE at 08:02

## 2024-01-01 RX ADMIN — FLUTICASONE FUROATE 1 PUFF: 100 POWDER RESPIRATORY (INHALATION) at 08:00

## 2024-01-01 RX ADMIN — FLUTICASONE FUROATE AND VILANTEROL TRIFENATATE 1 PUFF: 200; 25 POWDER RESPIRATORY (INHALATION) at 20:24

## 2024-01-01 RX ADMIN — ACETYLCYSTEINE 4 ML: 100 SOLUTION ORAL; RESPIRATORY (INHALATION) at 00:56

## 2024-01-01 RX ADMIN — ALBUTEROL SULFATE 2.5 MG: 2.5 SOLUTION RESPIRATORY (INHALATION) at 04:08

## 2024-01-01 RX ADMIN — BUMETANIDE 0.5 MG: 0.5 TABLET ORAL at 15:55

## 2024-01-01 RX ADMIN — ACETYLCYSTEINE 2 ML: 100 SOLUTION ORAL; RESPIRATORY (INHALATION) at 17:20

## 2024-01-01 RX ADMIN — METHYLPREDNISOLONE SODIUM SUCCINATE 62.5 MG: 125 INJECTION, POWDER, FOR SOLUTION INTRAMUSCULAR; INTRAVENOUS at 05:30

## 2024-01-01 RX ADMIN — THIAMINE HCL TAB 100 MG 100 MG: 100 TAB at 08:03

## 2024-01-01 RX ADMIN — EMPAGLIFLOZIN 10 MG: 10 TABLET, FILM COATED ORAL at 09:40

## 2024-01-01 RX ADMIN — ALBUTEROL SULFATE 2.5 MG: 2.5 SOLUTION RESPIRATORY (INHALATION) at 20:27

## 2024-01-01 RX ADMIN — ACETAZOLAMIDE 125 MG: 125 TABLET ORAL at 09:52

## 2024-01-01 RX ADMIN — FUROSEMIDE 80 MG: 40 TABLET ORAL at 09:09

## 2024-01-01 RX ADMIN — NYSTATIN 500000 UNITS: 100000 SUSPENSION ORAL at 08:02

## 2024-01-01 RX ADMIN — PIPERACILLIN AND TAZOBACTAM 4.5 G: 4; .5 INJECTION, POWDER, FOR SOLUTION INTRAVENOUS at 04:32

## 2024-01-01 RX ADMIN — NYSTATIN 1000000 UNITS: 100000 SUSPENSION ORAL at 15:57

## 2024-01-01 RX ADMIN — SODIUM CHLORIDE, POTASSIUM CHLORIDE, SODIUM LACTATE AND CALCIUM CHLORIDE 500 ML: 600; 310; 30; 20 INJECTION, SOLUTION INTRAVENOUS at 16:46

## 2024-01-01 RX ADMIN — CEFTRIAXONE SODIUM 2 G: 2 INJECTION, POWDER, FOR SOLUTION INTRAMUSCULAR; INTRAVENOUS at 16:26

## 2024-01-01 RX ADMIN — PREDNISONE 40 MG: 20 TABLET ORAL at 09:02

## 2024-01-01 RX ADMIN — Medication 12.5 MG: at 07:50

## 2024-01-01 RX ADMIN — POLYETHYLENE GLYCOL 3350 17 G: 17 POWDER, FOR SOLUTION ORAL at 09:01

## 2024-01-01 RX ADMIN — ATORVASTATIN CALCIUM 40 MG: 40 TABLET, FILM COATED ORAL at 20:41

## 2024-01-01 RX ADMIN — NYSTATIN 1000000 UNITS: 100000 SUSPENSION ORAL at 21:11

## 2024-01-01 RX ADMIN — ACETYLCYSTEINE 4 ML: 100 SOLUTION ORAL; RESPIRATORY (INHALATION) at 12:36

## 2024-01-01 RX ADMIN — METHYLPREDNISOLONE SODIUM SUCCINATE 62.5 MG: 125 INJECTION, POWDER, FOR SOLUTION INTRAMUSCULAR; INTRAVENOUS at 05:28

## 2024-01-01 RX ADMIN — WARFARIN SODIUM 1 MG: 1 TABLET ORAL at 18:03

## 2024-01-01 RX ADMIN — ALBUTEROL SULFATE 2.5 MG: 2.5 SOLUTION RESPIRATORY (INHALATION) at 20:44

## 2024-01-01 RX ADMIN — ACETYLCYSTEINE 4 ML: 100 SOLUTION ORAL; RESPIRATORY (INHALATION) at 19:47

## 2024-01-01 RX ADMIN — UMECLIDINIUM 1 PUFF: 62.5 AEROSOL, POWDER ORAL at 08:33

## 2024-01-01 RX ADMIN — ACETYLCYSTEINE 2 ML: 100 SOLUTION ORAL; RESPIRATORY (INHALATION) at 21:20

## 2024-01-01 RX ADMIN — UMECLIDINIUM 1 PUFF: 62.5 AEROSOL, POWDER ORAL at 09:13

## 2024-01-01 RX ADMIN — SODIUM CHLORIDE 600 MG: 9 INJECTION, SOLUTION INTRAVENOUS at 18:50

## 2024-01-01 RX ADMIN — METOPROLOL SUCCINATE 25 MG: 25 TABLET, EXTENDED RELEASE ORAL at 09:12

## 2024-01-01 RX ADMIN — ALBUMIN HUMAN 25 G: 0.25 SOLUTION INTRAVENOUS at 13:42

## 2024-01-01 RX ADMIN — LEVOFLOXACIN 750 MG: 250 TABLET, FILM COATED ORAL at 13:27

## 2024-01-01 RX ADMIN — OXYCODONE HYDROCHLORIDE AND ACETAMINOPHEN 1000 MG: 500 TABLET ORAL at 09:47

## 2024-01-01 RX ADMIN — FLUTICASONE FUROATE AND VILANTEROL 1 PUFF: 200; 25 POWDER RESPIRATORY (INHALATION) at 19:24

## 2024-01-01 RX ADMIN — ALBUTEROL SULFATE 2.5 MG: 2.5 SOLUTION RESPIRATORY (INHALATION) at 15:36

## 2024-01-01 RX ADMIN — ALBUMIN HUMAN 25 G: 0.25 SOLUTION INTRAVENOUS at 10:00

## 2024-01-01 RX ADMIN — METHYLPREDNISOLONE SODIUM SUCCINATE 100 MG: 125 INJECTION, POWDER, FOR SOLUTION INTRAMUSCULAR; INTRAVENOUS at 09:24

## 2024-01-01 RX ADMIN — ATORVASTATIN CALCIUM 40 MG: 40 TABLET, FILM COATED ORAL at 21:29

## 2024-01-01 RX ADMIN — FLUOXETINE HYDROCHLORIDE 20 MG: 20 CAPSULE ORAL at 09:40

## 2024-01-01 RX ADMIN — EMPAGLIFLOZIN 10 MG: 10 TABLET, FILM COATED ORAL at 09:00

## 2024-01-01 RX ADMIN — ALBUTEROL SULFATE 2.5 MG: 2.5 SOLUTION RESPIRATORY (INHALATION) at 12:36

## 2024-01-01 RX ADMIN — UMECLIDINIUM 1 PUFF: 62.5 AEROSOL, POWDER ORAL at 09:02

## 2024-01-01 RX ADMIN — UMECLIDINIUM 1 PUFF: 62.5 AEROSOL, POWDER ORAL at 07:51

## 2024-01-01 RX ADMIN — ACETYLCYSTEINE 2 ML: 100 SOLUTION ORAL; RESPIRATORY (INHALATION) at 13:56

## 2024-01-01 RX ADMIN — ACETYLCYSTEINE 2 ML: 100 SOLUTION ORAL; RESPIRATORY (INHALATION) at 15:01

## 2024-01-01 RX ADMIN — PREDNISONE 40 MG: 20 TABLET ORAL at 09:53

## 2024-01-01 RX ADMIN — EMPAGLIFLOZIN 10 MG: 10 TABLET, FILM COATED ORAL at 09:47

## 2024-01-01 RX ADMIN — ACETYLCYSTEINE 2 ML: 100 SOLUTION ORAL; RESPIRATORY (INHALATION) at 16:37

## 2024-01-01 RX ADMIN — PIPERACILLIN AND TAZOBACTAM 4.5 G: 4; .5 INJECTION, POWDER, FOR SOLUTION INTRAVENOUS at 17:51

## 2024-01-01 RX ADMIN — ACETYLCYSTEINE 2 ML: 100 SOLUTION ORAL; RESPIRATORY (INHALATION) at 11:40

## 2024-01-01 RX ADMIN — ASPIRIN 81 MG CHEWABLE TABLET 81 MG: 81 TABLET CHEWABLE at 09:08

## 2024-01-01 RX ADMIN — POTASSIUM CHLORIDE 40 MEQ: 1500 TABLET, EXTENDED RELEASE ORAL at 23:13

## 2024-01-01 RX ADMIN — UMECLIDINIUM 1 PUFF: 62.5 AEROSOL, POWDER ORAL at 08:05

## 2024-01-01 RX ADMIN — OXYCODONE HYDROCHLORIDE AND ACETAMINOPHEN 1000 MG: 500 TABLET ORAL at 09:50

## 2024-01-01 RX ADMIN — ACETYLCYSTEINE 2 ML: 100 SOLUTION ORAL; RESPIRATORY (INHALATION) at 12:13

## 2024-01-01 RX ADMIN — ACETYLCYSTEINE 2 ML: 100 SOLUTION ORAL; RESPIRATORY (INHALATION) at 09:01

## 2024-01-01 RX ADMIN — FLUTICASONE FUROATE AND VILANTEROL TRIFENATATE 1 PUFF: 200; 25 POWDER RESPIRATORY (INHALATION) at 09:41

## 2024-01-01 RX ADMIN — POTASSIUM & SODIUM PHOSPHATES POWDER PACK 280-160-250 MG 1 PACKET: 280-160-250 PACK at 09:12

## 2024-01-01 RX ADMIN — ACETYLCYSTEINE 2 ML: 100 SOLUTION ORAL; RESPIRATORY (INHALATION) at 00:18

## 2024-01-01 RX ADMIN — ASPIRIN 81 MG CHEWABLE TABLET 81 MG: 81 TABLET CHEWABLE at 08:23

## 2024-01-01 RX ADMIN — ASPIRIN 81 MG CHEWABLE TABLET 81 MG: 81 TABLET CHEWABLE at 08:33

## 2024-01-01 RX ADMIN — ACETYLCYSTEINE 2 ML: 100 SOLUTION ORAL; RESPIRATORY (INHALATION) at 04:55

## 2024-01-01 RX ADMIN — ACETYLCYSTEINE 2 ML: 100 SOLUTION ORAL; RESPIRATORY (INHALATION) at 01:08

## 2024-01-01 RX ADMIN — IPRATROPIUM BROMIDE AND ALBUTEROL SULFATE 3 ML: .5; 3 SOLUTION RESPIRATORY (INHALATION) at 19:56

## 2024-01-01 RX ADMIN — EMPAGLIFLOZIN 10 MG: 10 TABLET, FILM COATED ORAL at 09:12

## 2024-01-01 RX ADMIN — PREDNISONE 60 MG: 10 TABLET ORAL at 07:50

## 2024-01-01 RX ADMIN — PREDNISONE 20 MG: 20 TABLET ORAL at 08:30

## 2024-01-01 RX ADMIN — ALBUTEROL SULFATE 2.5 MG: 2.5 SOLUTION RESPIRATORY (INHALATION) at 20:29

## 2024-01-01 RX ADMIN — FLUOXETINE HYDROCHLORIDE 20 MG: 20 CAPSULE ORAL at 09:03

## 2024-01-01 RX ADMIN — Medication 12.5 MG: at 08:26

## 2024-01-01 RX ADMIN — ALBUTEROL SULFATE 2.5 MG: 2.5 SOLUTION RESPIRATORY (INHALATION) at 01:37

## 2024-01-01 RX ADMIN — LEVOFLOXACIN 500 MG: 250 TABLET, FILM COATED ORAL at 11:09

## 2024-01-01 RX ADMIN — FLUTICASONE FUROATE AND VILANTEROL 1 PUFF: 200; 25 POWDER RESPIRATORY (INHALATION) at 20:16

## 2024-01-01 RX ADMIN — POTASSIUM CHLORIDE 40 MEQ: 750 TABLET, EXTENDED RELEASE ORAL at 08:58

## 2024-01-01 RX ADMIN — ALBUTEROL SULFATE 2.5 MG: 2.5 SOLUTION RESPIRATORY (INHALATION) at 15:45

## 2024-01-01 RX ADMIN — POLYETHYLENE GLYCOL 3350 17 G: 17 POWDER, FOR SOLUTION ORAL at 21:46

## 2024-01-01 RX ADMIN — ALBUTEROL SULFATE 2.5 MG: 2.5 SOLUTION RESPIRATORY (INHALATION) at 20:41

## 2024-01-01 RX ADMIN — AMPICILLIN SODIUM AND SULBACTAM SODIUM 3 G: 2; 1 INJECTION, POWDER, FOR SOLUTION INTRAMUSCULAR; INTRAVENOUS at 21:33

## 2024-01-01 RX ADMIN — POLYETHYLENE GLYCOL 3350 17 G: 17 POWDER, FOR SOLUTION ORAL at 09:29

## 2024-01-01 RX ADMIN — ALBUTEROL SULFATE 2.5 MG: 2.5 SOLUTION RESPIRATORY (INHALATION) at 12:50

## 2024-01-01 RX ADMIN — FLUTICASONE FUROATE AND VILANTEROL TRIFENATATE 1 PUFF: 200; 25 POWDER RESPIRATORY (INHALATION) at 09:29

## 2024-01-01 RX ADMIN — ALBUTEROL SULFATE 2.5 MG: 2.5 SOLUTION RESPIRATORY (INHALATION) at 16:03

## 2024-01-01 RX ADMIN — NYSTATIN 500000 UNITS: 100000 SUSPENSION ORAL at 12:03

## 2024-01-01 RX ADMIN — FLUTICASONE FUROATE 1 PUFF: 100 POWDER RESPIRATORY (INHALATION) at 07:47

## 2024-01-01 RX ADMIN — ALBUTEROL SULFATE 2.5 MG: 2.5 SOLUTION RESPIRATORY (INHALATION) at 00:55

## 2024-01-01 RX ADMIN — ALBUTEROL SULFATE 2.5 MG: 2.5 SOLUTION RESPIRATORY (INHALATION) at 23:29

## 2024-01-01 RX ADMIN — NYSTATIN 1000000 UNITS: 100000 SUSPENSION ORAL at 08:30

## 2024-01-01 RX ADMIN — SODIUM CHLORIDE 600 MG: 9 INJECTION, SOLUTION INTRAVENOUS at 06:25

## 2024-01-01 RX ADMIN — PANTOPRAZOLE SODIUM 40 MG: 40 TABLET, DELAYED RELEASE ORAL at 07:59

## 2024-01-01 RX ADMIN — NYSTATIN 500000 UNITS: 100000 SUSPENSION ORAL at 20:48

## 2024-01-01 RX ADMIN — FLUTICASONE FUROATE AND VILANTEROL TRIFENATATE 1 PUFF: 200; 25 POWDER RESPIRATORY (INHALATION) at 08:25

## 2024-01-01 RX ADMIN — PREDNISONE 40 MG: 20 TABLET ORAL at 08:23

## 2024-01-01 RX ADMIN — WARFARIN SODIUM 1 MG: 1 TABLET ORAL at 19:04

## 2024-01-01 RX ADMIN — IPRATROPIUM BROMIDE AND ALBUTEROL SULFATE 3 ML: .5; 3 SOLUTION RESPIRATORY (INHALATION) at 08:24

## 2024-01-01 RX ADMIN — ALBUTEROL SULFATE 2.5 MG: 2.5 SOLUTION RESPIRATORY (INHALATION) at 12:33

## 2024-01-01 RX ADMIN — THIAMINE HCL TAB 100 MG 100 MG: 100 TAB at 09:47

## 2024-01-01 RX ADMIN — FLUTICASONE FUROATE AND VILANTEROL TRIFENATATE 1 PUFF: 200; 25 POWDER RESPIRATORY (INHALATION) at 09:13

## 2024-01-01 RX ADMIN — FLUTICASONE FUROATE AND VILANTEROL 1 PUFF: 200; 25 POWDER RESPIRATORY (INHALATION) at 20:37

## 2024-01-01 RX ADMIN — ATORVASTATIN CALCIUM 40 MG: 40 TABLET, FILM COATED ORAL at 20:49

## 2024-01-01 RX ADMIN — FLUTICASONE FUROATE AND VILANTEROL TRIFENATATE 1 PUFF: 200; 25 POWDER RESPIRATORY (INHALATION) at 08:33

## 2024-01-01 RX ADMIN — METHYLPREDNISOLONE SODIUM SUCCINATE 87.5 MG: 125 INJECTION, POWDER, FOR SOLUTION INTRAMUSCULAR; INTRAVENOUS at 10:24

## 2024-01-01 RX ADMIN — ACETYLCYSTEINE 2 ML: 100 SOLUTION ORAL; RESPIRATORY (INHALATION) at 16:35

## 2024-01-01 RX ADMIN — PIPERACILLIN AND TAZOBACTAM 4.5 G: 4; .5 INJECTION, POWDER, FOR SOLUTION INTRAVENOUS at 21:14

## 2024-01-01 RX ADMIN — NYSTATIN 500000 UNITS: 100000 SUSPENSION ORAL at 20:42

## 2024-01-01 RX ADMIN — NYSTATIN 500000 UNITS: 100000 SUSPENSION ORAL at 15:51

## 2024-01-01 RX ADMIN — ACETYLCYSTEINE 2 ML: 100 SOLUTION ORAL; RESPIRATORY (INHALATION) at 08:14

## 2024-01-01 RX ADMIN — NYSTATIN 500000 UNITS: 100000 SUSPENSION ORAL at 16:38

## 2024-01-01 RX ADMIN — PANTOPRAZOLE SODIUM 40 MG: 40 TABLET, DELAYED RELEASE ORAL at 16:35

## 2024-01-01 RX ADMIN — POLYETHYLENE GLYCOL 3350 17 G: 17 POWDER, FOR SOLUTION ORAL at 20:40

## 2024-01-01 RX ADMIN — ACETYLCYSTEINE 4 ML: 100 SOLUTION ORAL; RESPIRATORY (INHALATION) at 12:24

## 2024-01-01 RX ADMIN — PANTOPRAZOLE SODIUM 40 MG: 40 TABLET, DELAYED RELEASE ORAL at 09:02

## 2024-01-01 RX ADMIN — ACETYLCYSTEINE 2 ML: 100 SOLUTION ORAL; RESPIRATORY (INHALATION) at 20:27

## 2024-01-01 RX ADMIN — BUMETANIDE 1 MG: 1 TABLET ORAL at 09:04

## 2024-01-01 RX ADMIN — FUROSEMIDE 60 MG: 10 INJECTION, SOLUTION INTRAMUSCULAR; INTRAVENOUS at 14:22

## 2024-01-01 RX ADMIN — THIAMINE HCL TAB 100 MG 100 MG: 100 TAB at 09:52

## 2024-01-01 RX ADMIN — IPRATROPIUM BROMIDE AND ALBUTEROL SULFATE 3 ML: .5; 3 SOLUTION RESPIRATORY (INHALATION) at 15:20

## 2024-01-01 RX ADMIN — SODIUM CHLORIDE 400 MG: 9 INJECTION, SOLUTION INTRAVENOUS at 20:19

## 2024-01-01 RX ADMIN — POLYETHYLENE GLYCOL 3350 17 G: 17 POWDER, FOR SOLUTION ORAL at 20:48

## 2024-01-01 RX ADMIN — FUROSEMIDE 80 MG: 40 TABLET ORAL at 15:57

## 2024-01-01 RX ADMIN — ACETAZOLAMIDE 250 MG: 250 TABLET ORAL at 09:03

## 2024-01-01 RX ADMIN — UMECLIDINIUM 1 PUFF: 62.5 AEROSOL, POWDER ORAL at 08:34

## 2024-01-01 RX ADMIN — FLUOXETINE HYDROCHLORIDE 20 MG: 20 CAPSULE ORAL at 07:47

## 2024-01-01 RX ADMIN — ACETYLCYSTEINE 2 ML: 100 SOLUTION ORAL; RESPIRATORY (INHALATION) at 15:52

## 2024-01-01 RX ADMIN — METHYLPREDNISOLONE SODIUM SUCCINATE 100 MG: 125 INJECTION, POWDER, FOR SOLUTION INTRAMUSCULAR; INTRAVENOUS at 11:49

## 2024-01-01 RX ADMIN — FUROSEMIDE 40 MG: 10 INJECTION, SOLUTION INTRAVENOUS at 14:45

## 2024-01-01 RX ADMIN — PANTOPRAZOLE SODIUM 40 MG: 40 TABLET, DELAYED RELEASE ORAL at 09:01

## 2024-01-01 RX ADMIN — WARFARIN SODIUM 2 MG: 2 TABLET ORAL at 18:02

## 2024-01-01 RX ADMIN — FLUTICASONE FUROATE 1 PUFF: 100 POWDER RESPIRATORY (INHALATION) at 09:22

## 2024-01-01 RX ADMIN — BUMETANIDE 0.5 MG: 0.5 TABLET ORAL at 09:47

## 2024-01-01 RX ADMIN — ASPIRIN 81 MG CHEWABLE TABLET 81 MG: 81 TABLET CHEWABLE at 08:47

## 2024-01-01 RX ADMIN — NYSTATIN 500000 UNITS: 100000 SUSPENSION ORAL at 09:39

## 2024-01-01 RX ADMIN — NYSTATIN 500000 UNITS: 100000 SUSPENSION ORAL at 17:53

## 2024-01-01 RX ADMIN — PANTOPRAZOLE SODIUM 40 MG: 40 TABLET, DELAYED RELEASE ORAL at 17:34

## 2024-01-01 RX ADMIN — EMPAGLIFLOZIN 10 MG: 10 TABLET, FILM COATED ORAL at 09:52

## 2024-01-01 RX ADMIN — PIPERACILLIN AND TAZOBACTAM 4.5 G: 4; .5 INJECTION, POWDER, FOR SOLUTION INTRAVENOUS at 08:38

## 2024-01-01 RX ADMIN — PANTOPRAZOLE SODIUM 40 MG: 40 TABLET, DELAYED RELEASE ORAL at 15:55

## 2024-01-01 RX ADMIN — FLUOXETINE HYDROCHLORIDE 20 MG: 20 CAPSULE ORAL at 07:50

## 2024-01-01 RX ADMIN — FLUOXETINE HYDROCHLORIDE 20 MG: 20 CAPSULE ORAL at 09:29

## 2024-01-01 RX ADMIN — METOPROLOL SUCCINATE 25 MG: 25 TABLET, EXTENDED RELEASE ORAL at 07:59

## 2024-01-01 RX ADMIN — FLUTICASONE FUROATE AND VILANTEROL TRIFENATATE 1 PUFF: 200; 25 POWDER RESPIRATORY (INHALATION) at 07:53

## 2024-01-01 RX ADMIN — PIPERACILLIN AND TAZOBACTAM 4.5 G: 4; .5 INJECTION, POWDER, FOR SOLUTION INTRAVENOUS at 08:25

## 2024-01-01 RX ADMIN — EMPAGLIFLOZIN 10 MG: 10 TABLET, FILM COATED ORAL at 09:49

## 2024-01-01 RX ADMIN — PANTOPRAZOLE SODIUM 40 MG: 40 TABLET, DELAYED RELEASE ORAL at 11:09

## 2024-01-01 RX ADMIN — DOCUSATE SODIUM 50 MG AND SENNOSIDES 8.6 MG 1 TABLET: 8.6; 5 TABLET, FILM COATED ORAL at 07:58

## 2024-01-01 RX ADMIN — ALBUTEROL SULFATE 2.5 MG: 2.5 SOLUTION RESPIRATORY (INHALATION) at 20:06

## 2024-01-01 RX ADMIN — ALBUTEROL SULFATE 2.5 MG: 2.5 SOLUTION RESPIRATORY (INHALATION) at 15:54

## 2024-01-01 RX ADMIN — ALBUTEROL SULFATE 2.5 MG: 2.5 SOLUTION RESPIRATORY (INHALATION) at 00:57

## 2024-01-01 RX ADMIN — BUMETANIDE 0.5 MG: 0.5 TABLET ORAL at 16:40

## 2024-01-01 RX ADMIN — ALBUTEROL SULFATE 2.5 MG: 2.5 SOLUTION RESPIRATORY (INHALATION) at 11:59

## 2024-01-01 RX ADMIN — ALBUTEROL SULFATE 2.5 MG: 2.5 SOLUTION RESPIRATORY (INHALATION) at 15:01

## 2024-01-01 RX ADMIN — ASPIRIN 81 MG CHEWABLE TABLET 81 MG: 81 TABLET CHEWABLE at 09:02

## 2024-01-01 RX ADMIN — UMECLIDINIUM 1 PUFF: 62.5 AEROSOL, POWDER ORAL at 08:02

## 2024-01-01 RX ADMIN — PANTOPRAZOLE SODIUM 40 MG: 40 TABLET, DELAYED RELEASE ORAL at 08:03

## 2024-01-01 RX ADMIN — ALBUTEROL SULFATE 2.5 MG: 2.5 SOLUTION RESPIRATORY (INHALATION) at 01:30

## 2024-01-01 RX ADMIN — FLUTICASONE FUROATE AND VILANTEROL 1 PUFF: 200; 25 POWDER RESPIRATORY (INHALATION) at 21:39

## 2024-01-01 RX ADMIN — ALBUTEROL SULFATE 2.5 MG: 2.5 SOLUTION RESPIRATORY (INHALATION) at 16:35

## 2024-01-01 RX ADMIN — BUMETANIDE 0.5 MG: 0.5 TABLET ORAL at 09:04

## 2024-01-01 RX ADMIN — ACETYLCYSTEINE 2 ML: 100 SOLUTION ORAL; RESPIRATORY (INHALATION) at 05:12

## 2024-01-01 RX ADMIN — AMPICILLIN SODIUM AND SULBACTAM SODIUM 3 G: 2; 1 INJECTION, POWDER, FOR SOLUTION INTRAMUSCULAR; INTRAVENOUS at 21:50

## 2024-01-01 RX ADMIN — ACETYLCYSTEINE 2 ML: 100 SOLUTION ORAL; RESPIRATORY (INHALATION) at 16:52

## 2024-01-01 RX ADMIN — UMECLIDINIUM 1 PUFF: 62.5 AEROSOL, POWDER ORAL at 09:53

## 2024-01-01 RX ADMIN — ALBUTEROL SULFATE 2.5 MG: 2.5 SOLUTION RESPIRATORY (INHALATION) at 07:38

## 2024-01-01 RX ADMIN — METHYLPREDNISOLONE SODIUM SUCCINATE 100 MG: 125 INJECTION, POWDER, FOR SOLUTION INTRAMUSCULAR; INTRAVENOUS at 09:00

## 2024-01-01 RX ADMIN — ACETYLCYSTEINE 2 ML: 100 SOLUTION ORAL; RESPIRATORY (INHALATION) at 08:51

## 2024-01-01 RX ADMIN — NYSTATIN 500000 UNITS: 100000 SUSPENSION ORAL at 13:01

## 2024-01-01 RX ADMIN — IPRATROPIUM BROMIDE AND ALBUTEROL SULFATE 3 ML: .5; 3 SOLUTION RESPIRATORY (INHALATION) at 13:06

## 2024-01-01 RX ADMIN — BUMETANIDE 1 MG: 0.25 INJECTION INTRAMUSCULAR; INTRAVENOUS at 11:09

## 2024-01-01 RX ADMIN — ALBUTEROL SULFATE 2.5 MG: 2.5 SOLUTION RESPIRATORY (INHALATION) at 15:52

## 2024-01-01 RX ADMIN — ACETYLCYSTEINE 4 ML: 100 SOLUTION ORAL; RESPIRATORY (INHALATION) at 07:23

## 2024-01-01 RX ADMIN — AMPICILLIN SODIUM AND SULBACTAM SODIUM 3 G: 2; 1 INJECTION, POWDER, FOR SOLUTION INTRAMUSCULAR; INTRAVENOUS at 14:57

## 2024-01-01 RX ADMIN — FUROSEMIDE 40 MG: 10 INJECTION, SOLUTION INTRAVENOUS at 06:33

## 2024-01-01 RX ADMIN — ATORVASTATIN CALCIUM 40 MG: 40 TABLET, FILM COATED ORAL at 23:37

## 2024-01-01 RX ADMIN — POTASSIUM CHLORIDE 10 MEQ: 7.46 INJECTION, SOLUTION INTRAVENOUS at 14:49

## 2024-01-01 RX ADMIN — ACETAZOLAMIDE 125 MG: 125 TABLET ORAL at 08:30

## 2024-01-01 RX ADMIN — NYSTATIN 1000000 UNITS: 100000 SUSPENSION ORAL at 21:02

## 2024-01-01 RX ADMIN — FUROSEMIDE 60 MG: 10 INJECTION, SOLUTION INTRAMUSCULAR; INTRAVENOUS at 08:25

## 2024-01-01 RX ADMIN — ACETYLCYSTEINE 2 ML: 100 SOLUTION ORAL; RESPIRATORY (INHALATION) at 11:59

## 2024-01-01 RX ADMIN — FLUTICASONE FUROATE AND VILANTEROL TRIFENATATE 1 PUFF: 200; 25 POWDER RESPIRATORY (INHALATION) at 08:48

## 2024-01-01 RX ADMIN — FLUOXETINE HYDROCHLORIDE 20 MG: 20 CAPSULE ORAL at 09:47

## 2024-01-01 RX ADMIN — ALBUTEROL SULFATE 2.5 MG: 2.5 SOLUTION RESPIRATORY (INHALATION) at 19:47

## 2024-01-01 RX ADMIN — PANTOPRAZOLE SODIUM 40 MG: 40 TABLET, DELAYED RELEASE ORAL at 09:52

## 2024-01-01 RX ADMIN — ALBUTEROL SULFATE 2.5 MG: 2.5 SOLUTION RESPIRATORY (INHALATION) at 12:24

## 2024-01-01 RX ADMIN — SODIUM CHLORIDE, POTASSIUM CHLORIDE, SODIUM LACTATE AND CALCIUM CHLORIDE 250 ML: 600; 310; 30; 20 INJECTION, SOLUTION INTRAVENOUS at 21:14

## 2024-01-01 RX ADMIN — ACETYLCYSTEINE 2 ML: 100 SOLUTION ORAL; RESPIRATORY (INHALATION) at 15:45

## 2024-01-01 RX ADMIN — ALBUTEROL SULFATE 2.5 MG: 2.5 SOLUTION RESPIRATORY (INHALATION) at 19:31

## 2024-01-01 RX ADMIN — POTASSIUM CHLORIDE 20 MEQ: 1500 TABLET, EXTENDED RELEASE ORAL at 01:06

## 2024-01-01 RX ADMIN — NYSTATIN 1000000 UNITS: 100000 SUSPENSION ORAL at 12:46

## 2024-01-01 RX ADMIN — ALBUTEROL SULFATE 2.5 MG: 2.5 SOLUTION RESPIRATORY (INHALATION) at 00:22

## 2024-01-01 RX ADMIN — NYSTATIN 1000000 UNITS: 100000 SUSPENSION ORAL at 09:29

## 2024-01-01 RX ADMIN — FLUTICASONE FUROATE AND VILANTEROL TRIFENATATE 1 PUFF: 200; 25 POWDER RESPIRATORY (INHALATION) at 09:02

## 2024-01-01 RX ADMIN — OXYCODONE HYDROCHLORIDE AND ACETAMINOPHEN 1000 MG: 500 TABLET ORAL at 09:40

## 2024-01-01 RX ADMIN — ASPIRIN 81 MG CHEWABLE TABLET 81 MG: 81 TABLET CHEWABLE at 07:47

## 2024-01-01 RX ADMIN — ACETYLCYSTEINE 4 ML: 100 SOLUTION ORAL; RESPIRATORY (INHALATION) at 17:04

## 2024-01-01 RX ADMIN — ACETYLCYSTEINE 2 ML: 100 SOLUTION ORAL; RESPIRATORY (INHALATION) at 11:55

## 2024-01-01 RX ADMIN — PANTOPRAZOLE SODIUM 40 MG: 40 TABLET, DELAYED RELEASE ORAL at 09:40

## 2024-01-01 RX ADMIN — BUMETANIDE 0.5 MG: 0.5 TABLET ORAL at 17:53

## 2024-01-01 RX ADMIN — WARFARIN SODIUM 1.5 MG: 3 TABLET ORAL at 18:01

## 2024-01-01 RX ADMIN — METOPROLOL SUCCINATE 25 MG: 25 TABLET, EXTENDED RELEASE ORAL at 09:14

## 2024-01-01 RX ADMIN — FLUOXETINE HYDROCHLORIDE 20 MG: 20 CAPSULE ORAL at 09:53

## 2024-01-01 RX ADMIN — PREDNISONE 20 MG: 20 TABLET ORAL at 09:10

## 2024-01-01 RX ADMIN — ATORVASTATIN CALCIUM 40 MG: 40 TABLET, FILM COATED ORAL at 20:40

## 2024-01-01 RX ADMIN — PREDNISONE 60 MG: 10 TABLET ORAL at 07:59

## 2024-01-01 RX ADMIN — WARFARIN SODIUM 2 MG: 2 TABLET ORAL at 17:50

## 2024-01-01 RX ADMIN — EMPAGLIFLOZIN 10 MG: 10 TABLET, FILM COATED ORAL at 08:03

## 2024-01-01 RX ADMIN — ACETYLCYSTEINE 2 ML: 100 SOLUTION ORAL; RESPIRATORY (INHALATION) at 05:57

## 2024-01-01 RX ADMIN — ACETYLCYSTEINE 2 ML: 100 SOLUTION ORAL; RESPIRATORY (INHALATION) at 20:06

## 2024-01-01 RX ADMIN — PIPERACILLIN AND TAZOBACTAM 4.5 G: 4; .5 INJECTION, POWDER, FOR SOLUTION INTRAVENOUS at 20:38

## 2024-01-01 RX ADMIN — EMPAGLIFLOZIN 10 MG: 10 TABLET, FILM COATED ORAL at 08:47

## 2024-01-01 RX ADMIN — FLUOXETINE HYDROCHLORIDE 20 MG: 20 CAPSULE ORAL at 09:08

## 2024-01-01 RX ADMIN — THIAMINE HCL TAB 100 MG 100 MG: 100 TAB at 09:29

## 2024-01-01 RX ADMIN — POTASSIUM CHLORIDE 20 MEQ: 1500 TABLET, EXTENDED RELEASE ORAL at 09:41

## 2024-01-01 RX ADMIN — ACETYLCYSTEINE 2 ML: 100 SOLUTION ORAL; RESPIRATORY (INHALATION) at 09:29

## 2024-01-01 RX ADMIN — POLYETHYLENE GLYCOL 3350 17 G: 17 POWDER, FOR SOLUTION ORAL at 09:06

## 2024-01-01 RX ADMIN — ALBUTEROL SULFATE 2.5 MG: 2.5 SOLUTION RESPIRATORY (INHALATION) at 16:51

## 2024-01-01 RX ADMIN — ASPIRIN 81 MG CHEWABLE TABLET 81 MG: 81 TABLET CHEWABLE at 09:10

## 2024-01-01 RX ADMIN — ATORVASTATIN CALCIUM 40 MG: 40 TABLET, FILM COATED ORAL at 20:39

## 2024-01-01 RX ADMIN — EMPAGLIFLOZIN 10 MG: 10 TABLET, FILM COATED ORAL at 08:33

## 2024-01-01 RX ADMIN — FLUTICASONE FUROATE AND VILANTEROL TRIFENATATE 1 PUFF: 200; 25 POWDER RESPIRATORY (INHALATION) at 08:34

## 2024-01-01 RX ADMIN — WARFARIN SODIUM 1.5 MG: 3 TABLET ORAL at 18:13

## 2024-01-01 RX ADMIN — DOXYCYCLINE HYCLATE 100 MG: 100 CAPSULE ORAL at 07:47

## 2024-01-01 RX ADMIN — ALBUTEROL SULFATE 2.5 MG: 2.5 SOLUTION RESPIRATORY (INHALATION) at 12:47

## 2024-01-01 RX ADMIN — PIPERACILLIN AND TAZOBACTAM 4.5 G: 4; .5 INJECTION, POWDER, FOR SOLUTION INTRAVENOUS at 09:00

## 2024-01-01 RX ADMIN — ALBUTEROL SULFATE 2.5 MG: 2.5 SOLUTION RESPIRATORY (INHALATION) at 04:02

## 2024-01-01 RX ADMIN — WARFARIN SODIUM 2.5 MG: 2.5 TABLET ORAL at 17:32

## 2024-01-01 RX ADMIN — ASPIRIN 81 MG CHEWABLE TABLET 81 MG: 81 TABLET CHEWABLE at 08:03

## 2024-01-01 RX ADMIN — THIAMINE HCL TAB 100 MG 100 MG: 100 TAB at 08:33

## 2024-01-01 RX ADMIN — UMECLIDINIUM 1 PUFF: 62.5 AEROSOL, POWDER ORAL at 08:00

## 2024-01-01 RX ADMIN — Medication 6.25 MG: at 09:52

## 2024-01-01 RX ADMIN — LEVOFLOXACIN 500 MG: 250 TABLET, FILM COATED ORAL at 09:40

## 2024-01-01 RX ADMIN — EMPAGLIFLOZIN 10 MG: 10 TABLET, FILM COATED ORAL at 07:59

## 2024-01-01 RX ADMIN — PANTOPRAZOLE SODIUM 40 MG: 40 TABLET, DELAYED RELEASE ORAL at 07:50

## 2024-01-01 RX ADMIN — POTASSIUM CHLORIDE 10 MEQ: 7.46 INJECTION, SOLUTION INTRAVENOUS at 11:32

## 2024-01-01 RX ADMIN — AMPICILLIN SODIUM AND SULBACTAM SODIUM 3 G: 2; 1 INJECTION, POWDER, FOR SOLUTION INTRAMUSCULAR; INTRAVENOUS at 20:14

## 2024-01-01 RX ADMIN — BUMETANIDE 0.5 MG: 0.5 TABLET ORAL at 09:40

## 2024-01-01 RX ADMIN — ACETYLCYSTEINE 2 ML: 100 SOLUTION ORAL; RESPIRATORY (INHALATION) at 19:30

## 2024-01-01 RX ADMIN — NYSTATIN 500000 UNITS: 100000 SUSPENSION ORAL at 21:29

## 2024-01-01 RX ADMIN — ALBUTEROL SULFATE 2.5 MG: 2.5 SOLUTION RESPIRATORY (INHALATION) at 04:21

## 2024-01-01 RX ADMIN — ACETYLCYSTEINE 4 ML: 100 SOLUTION ORAL; RESPIRATORY (INHALATION) at 01:30

## 2024-01-01 RX ADMIN — ALBUTEROL SULFATE 2.5 MG: 2.5 SOLUTION RESPIRATORY (INHALATION) at 01:06

## 2024-01-01 RX ADMIN — THIAMINE HCL TAB 100 MG 100 MG: 100 TAB at 09:02

## 2024-01-01 RX ADMIN — LEVOFLOXACIN 750 MG: 250 TABLET, FILM COATED ORAL at 13:30

## 2024-01-01 RX ADMIN — KIT FOR THE PREPARATION OF TECHNETIUM TC 99M ALBUMIN AGGREGATED 7 MILLICURIE: 2.5 INJECTION, POWDER, FOR SOLUTION INTRAVENOUS at 12:23

## 2024-01-01 RX ADMIN — OXYCODONE HYDROCHLORIDE AND ACETAMINOPHEN 1000 MG: 500 TABLET ORAL at 07:58

## 2024-01-01 RX ADMIN — ALBUTEROL SULFATE 2.5 MG: 2.5 SOLUTION RESPIRATORY (INHALATION) at 00:42

## 2024-01-01 RX ADMIN — FLUOXETINE HYDROCHLORIDE 20 MG: 20 CAPSULE ORAL at 09:12

## 2024-01-01 RX ADMIN — ALBUTEROL SULFATE 2.5 MG: 2.5 SOLUTION RESPIRATORY (INHALATION) at 17:20

## 2024-01-01 RX ADMIN — BUMETANIDE 0.5 MG: 0.5 TABLET ORAL at 15:56

## 2024-01-01 RX ADMIN — BUMETANIDE 1 MG: 1 TABLET ORAL at 15:54

## 2024-01-01 RX ADMIN — ACETYLCYSTEINE 2 ML: 100 SOLUTION ORAL; RESPIRATORY (INHALATION) at 15:26

## 2024-01-01 RX ADMIN — PANTOPRAZOLE SODIUM 40 MG: 40 TABLET, DELAYED RELEASE ORAL at 21:02

## 2024-01-01 RX ADMIN — BUMETANIDE 3 MG: 2 TABLET ORAL at 08:32

## 2024-01-01 RX ADMIN — ASPIRIN 81 MG CHEWABLE TABLET 81 MG: 81 TABLET CHEWABLE at 07:58

## 2024-01-01 RX ADMIN — NYSTATIN 500000 UNITS: 100000 SUSPENSION ORAL at 15:56

## 2024-01-01 RX ADMIN — POLYETHYLENE GLYCOL 3350 17 G: 17 POWDER, FOR SOLUTION ORAL at 20:45

## 2024-01-01 RX ADMIN — AMPICILLIN SODIUM AND SULBACTAM SODIUM 3 G: 2; 1 INJECTION, POWDER, FOR SOLUTION INTRAMUSCULAR; INTRAVENOUS at 14:44

## 2024-01-01 RX ADMIN — UMECLIDINIUM 1 PUFF: 62.5 AEROSOL, POWDER ORAL at 09:41

## 2024-01-01 RX ADMIN — OXYCODONE HYDROCHLORIDE AND ACETAMINOPHEN 1000 MG: 500 TABLET ORAL at 09:08

## 2024-01-01 RX ADMIN — IPRATROPIUM BROMIDE AND ALBUTEROL SULFATE 3 ML: .5; 3 SOLUTION RESPIRATORY (INHALATION) at 16:41

## 2024-01-01 RX ADMIN — ALBUTEROL SULFATE 2.5 MG: 2.5 SOLUTION RESPIRATORY (INHALATION) at 16:18

## 2024-01-01 RX ADMIN — FLUOXETINE HYDROCHLORIDE 20 MG: 20 CAPSULE ORAL at 08:32

## 2024-01-01 RX ADMIN — METHYLPREDNISOLONE SODIUM SUCCINATE 125 MG: 125 INJECTION, POWDER, FOR SOLUTION INTRAMUSCULAR; INTRAVENOUS at 05:54

## 2024-01-01 RX ADMIN — ALBUTEROL SULFATE 2.5 MG: 2.5 SOLUTION RESPIRATORY (INHALATION) at 19:57

## 2024-01-01 RX ADMIN — VORICONAZOLE 200 MG: 200 TABLET ORAL at 21:46

## 2024-01-01 RX ADMIN — ACETYLCYSTEINE 4 ML: 100 SOLUTION ORAL; RESPIRATORY (INHALATION) at 04:06

## 2024-01-01 RX ADMIN — ACETAZOLAMIDE 250 MG: 250 TABLET ORAL at 16:36

## 2024-01-01 RX ADMIN — ACETYLCYSTEINE 2 ML: 100 SOLUTION ORAL; RESPIRATORY (INHALATION) at 12:48

## 2024-01-01 RX ADMIN — ACETYLCYSTEINE 2 ML: 100 SOLUTION ORAL; RESPIRATORY (INHALATION) at 16:18

## 2024-01-01 RX ADMIN — POLYETHYLENE GLYCOL 3350 17 G: 17 POWDER, FOR SOLUTION ORAL at 09:52

## 2024-01-01 RX ADMIN — ACETYLCYSTEINE 2 ML: 100 SOLUTION ORAL; RESPIRATORY (INHALATION) at 12:47

## 2024-01-01 RX ADMIN — ASPIRIN 81 MG CHEWABLE TABLET 81 MG: 81 TABLET CHEWABLE at 09:40

## 2024-01-01 RX ADMIN — WARFARIN SODIUM 2 MG: 2 TABLET ORAL at 20:44

## 2024-01-01 RX ADMIN — ACETYLCYSTEINE 2 ML: 100 SOLUTION ORAL; RESPIRATORY (INHALATION) at 08:20

## 2024-01-01 RX ADMIN — NYSTATIN 500000 UNITS: 100000 SUSPENSION ORAL at 09:46

## 2024-01-01 RX ADMIN — ALBUTEROL SULFATE 2.5 MG: 2.5 SOLUTION RESPIRATORY (INHALATION) at 15:26

## 2024-01-01 RX ADMIN — PIPERACILLIN AND TAZOBACTAM 4.5 G: 4; .5 INJECTION, POWDER, FOR SOLUTION INTRAVENOUS at 16:38

## 2024-01-01 RX ADMIN — NYSTATIN 1000000 UNITS: 100000 SUSPENSION ORAL at 20:13

## 2024-01-01 RX ADMIN — ACETYLCYSTEINE 2 ML: 100 SOLUTION ORAL; RESPIRATORY (INHALATION) at 07:38

## 2024-01-01 RX ADMIN — FLUOXETINE HYDROCHLORIDE 20 MG: 20 CAPSULE ORAL at 09:10

## 2024-01-01 RX ADMIN — UMECLIDINIUM 1 PUFF: 62.5 AEROSOL, POWDER ORAL at 08:48

## 2024-01-01 RX ADMIN — IPRATROPIUM BROMIDE AND ALBUTEROL SULFATE 3 ML: .5; 3 SOLUTION RESPIRATORY (INHALATION) at 12:29

## 2024-01-01 RX ADMIN — WARFARIN SODIUM 1 MG: 1 TABLET ORAL at 18:04

## 2024-01-01 RX ADMIN — UMECLIDINIUM 1 PUFF: 62.5 AEROSOL, POWDER ORAL at 09:28

## 2024-01-01 RX ADMIN — PHYTONADIONE 5 MG: 10 INJECTION, EMULSION INTRAMUSCULAR; INTRAVENOUS; SUBCUTANEOUS at 21:51

## 2024-01-01 RX ADMIN — NYSTATIN 500000 UNITS: 100000 SUSPENSION ORAL at 13:10

## 2024-01-01 RX ADMIN — FLUOXETINE HYDROCHLORIDE 20 MG: 20 CAPSULE ORAL at 07:58

## 2024-01-01 RX ADMIN — POLYETHYLENE GLYCOL 3350 17 G: 17 POWDER, FOR SOLUTION ORAL at 20:19

## 2024-01-01 RX ADMIN — ACETYLCYSTEINE 2 ML: 100 SOLUTION ORAL; RESPIRATORY (INHALATION) at 05:00

## 2024-01-01 RX ADMIN — UMECLIDINIUM 1 PUFF: 62.5 AEROSOL, POWDER ORAL at 09:29

## 2024-01-01 RX ADMIN — NYSTATIN 1000000 UNITS: 100000 SUSPENSION ORAL at 18:01

## 2024-01-01 RX ADMIN — NYSTATIN 500000 UNITS: 100000 SUSPENSION ORAL at 17:35

## 2024-01-01 RX ADMIN — ASPIRIN 81 MG CHEWABLE TABLET 81 MG: 81 TABLET CHEWABLE at 09:47

## 2024-01-01 RX ADMIN — POLYETHYLENE GLYCOL 3350 17 G: 17 POWDER, FOR SOLUTION ORAL at 09:09

## 2024-01-01 RX ADMIN — POTASSIUM & SODIUM PHOSPHATES POWDER PACK 280-160-250 MG 1 PACKET: 280-160-250 PACK at 13:04

## 2024-01-01 RX ADMIN — EMPAGLIFLOZIN 10 MG: 10 TABLET, FILM COATED ORAL at 07:50

## 2024-01-01 RX ADMIN — EMPAGLIFLOZIN 10 MG: 10 TABLET, FILM COATED ORAL at 09:03

## 2024-01-01 RX ADMIN — POLYETHYLENE GLYCOL 3350 17 G: 17 POWDER, FOR SOLUTION ORAL at 19:55

## 2024-01-01 RX ADMIN — FLUOXETINE 20 MG: 20 CAPSULE ORAL at 08:17

## 2024-01-01 RX ADMIN — ALBUTEROL SULFATE 2.5 MG: 2.5 SOLUTION RESPIRATORY (INHALATION) at 08:14

## 2024-01-01 RX ADMIN — POTASSIUM CHLORIDE 10 MEQ: 7.46 INJECTION, SOLUTION INTRAVENOUS at 12:37

## 2024-01-01 RX ADMIN — PREDNISONE 60 MG: 10 TABLET ORAL at 09:47

## 2024-01-01 RX ADMIN — HEPARIN SODIUM 1150 UNITS/HR: 10000 INJECTION, SOLUTION INTRAVENOUS at 09:18

## 2024-01-01 RX ADMIN — ALBUTEROL SULFATE 2.5 MG: 2.5 SOLUTION RESPIRATORY (INHALATION) at 17:05

## 2024-01-01 RX ADMIN — NYSTATIN 500000 UNITS: 100000 SUSPENSION ORAL at 20:41

## 2024-01-01 RX ADMIN — AMPICILLIN SODIUM AND SULBACTAM SODIUM 3 G: 2; 1 INJECTION, POWDER, FOR SOLUTION INTRAMUSCULAR; INTRAVENOUS at 03:39

## 2024-01-01 RX ADMIN — ALBUTEROL SULFATE 2.5 MG: 2.5 SOLUTION RESPIRATORY (INHALATION) at 07:23

## 2024-01-01 RX ADMIN — OXYCODONE HYDROCHLORIDE AND ACETAMINOPHEN 1000 MG: 500 TABLET ORAL at 09:12

## 2024-01-01 RX ADMIN — LEVOFLOXACIN 500 MG: 250 TABLET, FILM COATED ORAL at 07:59

## 2024-01-01 RX ADMIN — ACETYLCYSTEINE 2 ML: 100 SOLUTION ORAL; RESPIRATORY (INHALATION) at 04:02

## 2024-01-01 RX ADMIN — ASPIRIN 81 MG CHEWABLE TABLET 81 MG: 81 TABLET CHEWABLE at 07:50

## 2024-01-01 RX ADMIN — ACETAZOLAMIDE 250 MG: 250 TABLET ORAL at 08:24

## 2024-01-01 RX ADMIN — PANTOPRAZOLE SODIUM 40 MG: 40 TABLET, DELAYED RELEASE ORAL at 09:18

## 2024-01-01 RX ADMIN — PANTOPRAZOLE SODIUM 40 MG: 40 TABLET, DELAYED RELEASE ORAL at 20:48

## 2024-01-01 RX ADMIN — ACETYLCYSTEINE 2 ML: 100 SOLUTION ORAL; RESPIRATORY (INHALATION) at 04:08

## 2024-01-01 RX ADMIN — FLUTICASONE FUROATE AND VILANTEROL TRIFENATATE 1 PUFF: 200; 25 POWDER RESPIRATORY (INHALATION) at 09:09

## 2024-01-01 RX ADMIN — ASPIRIN 81 MG CHEWABLE TABLET 81 MG: 81 TABLET CHEWABLE at 09:53

## 2024-01-01 RX ADMIN — ACETYLCYSTEINE 2 ML: 100 SOLUTION ORAL; RESPIRATORY (INHALATION) at 04:52

## 2024-01-01 RX ADMIN — NYSTATIN 1000000 UNITS: 100000 SUSPENSION ORAL at 12:52

## 2024-01-01 RX ADMIN — ALBUTEROL SULFATE 2.5 MG: 2.5 SOLUTION RESPIRATORY (INHALATION) at 19:39

## 2024-01-01 RX ADMIN — PREDNISONE 40 MG: 20 TABLET ORAL at 09:28

## 2024-01-01 RX ADMIN — FLUTICASONE FUROATE 1 PUFF: 100 POWDER RESPIRATORY (INHALATION) at 09:14

## 2024-01-01 RX ADMIN — ALBUTEROL SULFATE 2.5 MG: 2.5 SOLUTION RESPIRATORY (INHALATION) at 03:31

## 2024-01-01 RX ADMIN — UMECLIDINIUM 1 PUFF: 62.5 AEROSOL, POWDER ORAL at 09:39

## 2024-01-01 RX ADMIN — BUMETANIDE 0.5 MG: 0.5 TABLET ORAL at 18:03

## 2024-01-01 RX ADMIN — NYSTATIN 500000 UNITS: 100000 SUSPENSION ORAL at 13:30

## 2024-01-01 RX ADMIN — ALBUTEROL SULFATE 2.5 MG: 2.5 SOLUTION RESPIRATORY (INHALATION) at 16:12

## 2024-01-01 RX ADMIN — PREDNISONE 60 MG: 10 TABLET ORAL at 08:59

## 2024-01-01 RX ADMIN — THIAMINE HCL TAB 100 MG 100 MG: 100 TAB at 07:50

## 2024-01-01 RX ADMIN — ACETYLCYSTEINE 2 ML: 100 SOLUTION ORAL; RESPIRATORY (INHALATION) at 00:41

## 2024-01-01 RX ADMIN — ALBUTEROL SULFATE 2.5 MG: 2.5 SOLUTION RESPIRATORY (INHALATION) at 09:19

## 2024-01-01 RX ADMIN — ACETYLCYSTEINE 2 ML: 100 SOLUTION ORAL; RESPIRATORY (INHALATION) at 12:21

## 2024-01-01 RX ADMIN — BUMETANIDE 0.5 MG: 0.5 TABLET ORAL at 08:03

## 2024-01-01 RX ADMIN — ALBUTEROL SULFATE 2.5 MG: 2.5 SOLUTION RESPIRATORY (INHALATION) at 01:22

## 2024-01-01 RX ADMIN — NYSTATIN 500000 UNITS: 100000 SUSPENSION ORAL at 09:23

## 2024-01-01 RX ADMIN — CEFTRIAXONE SODIUM 2 G: 2 INJECTION, POWDER, FOR SOLUTION INTRAMUSCULAR; INTRAVENOUS at 15:57

## 2024-01-01 RX ADMIN — EMPAGLIFLOZIN 10 MG: 10 TABLET, FILM COATED ORAL at 09:41

## 2024-01-01 RX ADMIN — ALBUTEROL SULFATE 2.5 MG: 2.5 SOLUTION RESPIRATORY (INHALATION) at 04:06

## 2024-01-01 RX ADMIN — ACETYLCYSTEINE 2 ML: 100 SOLUTION ORAL; RESPIRATORY (INHALATION) at 08:22

## 2024-01-01 RX ADMIN — NYSTATIN 500000 UNITS: 100000 SUSPENSION ORAL at 20:17

## 2024-01-01 RX ADMIN — ASPIRIN 81 MG CHEWABLE TABLET 81 MG: 81 TABLET CHEWABLE at 09:12

## 2024-01-01 RX ADMIN — ACETAZOLAMIDE 125 MG: 125 TABLET ORAL at 13:34

## 2024-01-01 RX ADMIN — FLUTICASONE FUROATE AND VILANTEROL TRIFENATATE 1 PUFF: 200; 25 POWDER RESPIRATORY (INHALATION) at 08:04

## 2024-01-01 RX ADMIN — PANTOPRAZOLE SODIUM 40 MG: 40 TABLET, DELAYED RELEASE ORAL at 09:09

## 2024-01-01 RX ADMIN — NYSTATIN 500000 UNITS: 100000 SUSPENSION ORAL at 09:06

## 2024-01-01 RX ADMIN — PANTOPRAZOLE SODIUM 40 MG: 40 TABLET, DELAYED RELEASE ORAL at 15:56

## 2024-01-01 RX ADMIN — ERGOCALCIFEROL 50000 UNITS: 1.25 CAPSULE, LIQUID FILLED ORAL at 08:47

## 2024-01-01 RX ADMIN — ALBUTEROL SULFATE 2.5 MG: 2.5 SOLUTION RESPIRATORY (INHALATION) at 15:51

## 2024-01-01 RX ADMIN — NYSTATIN 1000000 UNITS: 100000 SUSPENSION ORAL at 15:59

## 2024-01-01 RX ADMIN — ACETYLCYSTEINE 2 ML: 100 SOLUTION ORAL; RESPIRATORY (INHALATION) at 19:57

## 2024-01-01 RX ADMIN — SPIRONOLACTONE 12.5 MG: 25 TABLET, FILM COATED ORAL at 08:29

## 2024-01-01 RX ADMIN — ACETYLCYSTEINE 4 ML: 100 SOLUTION ORAL; RESPIRATORY (INHALATION) at 08:25

## 2024-01-01 RX ADMIN — SPIRONOLACTONE 12.5 MG: 25 TABLET, FILM COATED ORAL at 08:38

## 2024-01-01 RX ADMIN — AMPICILLIN SODIUM AND SULBACTAM SODIUM 3 G: 2; 1 INJECTION, POWDER, FOR SOLUTION INTRAMUSCULAR; INTRAVENOUS at 21:13

## 2024-01-01 RX ADMIN — ACETYLCYSTEINE 4 ML: 100 SOLUTION ORAL; RESPIRATORY (INHALATION) at 08:49

## 2024-01-01 RX ADMIN — PANTOPRAZOLE SODIUM 40 MG: 40 TABLET, DELAYED RELEASE ORAL at 21:14

## 2024-01-01 RX ADMIN — ACETYLCYSTEINE 2 ML: 100 SOLUTION ORAL; RESPIRATORY (INHALATION) at 20:21

## 2024-01-01 RX ADMIN — ALBUTEROL SULFATE 2.5 MG: 2.5 SOLUTION RESPIRATORY (INHALATION) at 09:29

## 2024-01-01 RX ADMIN — ALBUTEROL SULFATE 2.5 MG: 2.5 SOLUTION RESPIRATORY (INHALATION) at 01:08

## 2024-01-01 RX ADMIN — VANCOMYCIN HYDROCHLORIDE 2250 MG: 1 INJECTION, POWDER, LYOPHILIZED, FOR SOLUTION INTRAVENOUS at 17:23

## 2024-01-01 RX ADMIN — SPIRONOLACTONE 12.5 MG: 25 TABLET, FILM COATED ORAL at 09:28

## 2024-01-01 RX ADMIN — UMECLIDINIUM 1 PUFF: 62.5 AEROSOL, POWDER ORAL at 09:01

## 2024-01-01 RX ADMIN — FLUOXETINE HYDROCHLORIDE 20 MG: 20 CAPSULE ORAL at 08:03

## 2024-01-01 RX ADMIN — NYSTATIN 500000 UNITS: 100000 SUSPENSION ORAL at 20:49

## 2024-01-01 RX ADMIN — POLYETHYLENE GLYCOL 3350 17 G: 17 POWDER, FOR SOLUTION ORAL at 08:18

## 2024-01-01 RX ADMIN — IPRATROPIUM BROMIDE AND ALBUTEROL SULFATE 3 ML: .5; 3 SOLUTION RESPIRATORY (INHALATION) at 14:02

## 2024-01-01 RX ADMIN — AMPICILLIN SODIUM AND SULBACTAM SODIUM 3 G: 2; 1 INJECTION, POWDER, FOR SOLUTION INTRAMUSCULAR; INTRAVENOUS at 09:16

## 2024-01-01 RX ADMIN — THIAMINE HCL TAB 100 MG 100 MG: 100 TAB at 09:16

## 2024-01-01 RX ADMIN — ACETAZOLAMIDE 250 MG: 250 TABLET ORAL at 07:50

## 2024-01-01 RX ADMIN — BUMETANIDE 2 MG: 0.25 INJECTION INTRAMUSCULAR; INTRAVENOUS at 09:59

## 2024-01-01 RX ADMIN — ASPIRIN 81 MG CHEWABLE TABLET 81 MG: 81 TABLET CHEWABLE at 08:30

## 2024-01-01 RX ADMIN — ALBUTEROL SULFATE 2.5 MG: 2.5 SOLUTION RESPIRATORY (INHALATION) at 08:03

## 2024-01-01 RX ADMIN — WARFARIN SODIUM 2.5 MG: 2.5 TABLET ORAL at 17:52

## 2024-01-01 RX ADMIN — NYSTATIN 1000000 UNITS: 100000 SUSPENSION ORAL at 09:10

## 2024-01-01 RX ADMIN — THIAMINE HCL TAB 100 MG 100 MG: 100 TAB at 08:24

## 2024-01-01 RX ADMIN — VANCOMYCIN HYDROCHLORIDE 1000 MG: 1 INJECTION, SOLUTION INTRAVENOUS at 17:32

## 2024-01-01 RX ADMIN — ALBUTEROL SULFATE 2.5 MG: 2.5 SOLUTION RESPIRATORY (INHALATION) at 12:53

## 2024-01-01 RX ADMIN — POLYETHYLENE GLYCOL 3350 17 G: 17 POWDER, FOR SOLUTION ORAL at 16:35

## 2024-01-01 RX ADMIN — AMPICILLIN SODIUM AND SULBACTAM SODIUM 3 G: 2; 1 INJECTION, POWDER, FOR SOLUTION INTRAMUSCULAR; INTRAVENOUS at 15:56

## 2024-01-01 RX ADMIN — POTASSIUM CHLORIDE 40 MEQ: 1500 TABLET, EXTENDED RELEASE ORAL at 08:17

## 2024-01-01 RX ADMIN — PREDNISONE 60 MG: 10 TABLET ORAL at 08:03

## 2024-01-01 RX ADMIN — AMPICILLIN SODIUM AND SULBACTAM SODIUM 3 G: 2; 1 INJECTION, POWDER, FOR SOLUTION INTRAMUSCULAR; INTRAVENOUS at 03:07

## 2024-01-01 RX ADMIN — NYSTATIN 500000 UNITS: 100000 SUSPENSION ORAL at 20:47

## 2024-01-01 RX ADMIN — ACETYLCYSTEINE 2 ML: 100 SOLUTION ORAL; RESPIRATORY (INHALATION) at 01:37

## 2024-01-01 RX ADMIN — OXYCODONE HYDROCHLORIDE AND ACETAMINOPHEN 1000 MG: 500 TABLET ORAL at 08:03

## 2024-01-01 RX ADMIN — OXYCODONE HYDROCHLORIDE AND ACETAMINOPHEN 1000 MG: 500 TABLET ORAL at 09:03

## 2024-01-01 RX ADMIN — NYSTATIN 500000 UNITS: 100000 SUSPENSION ORAL at 15:55

## 2024-01-01 RX ADMIN — ALBUTEROL SULFATE 2.5 MG: 2.5 SOLUTION RESPIRATORY (INHALATION) at 08:21

## 2024-01-01 RX ADMIN — ALBUTEROL SULFATE 2.5 MG: 2.5 SOLUTION RESPIRATORY (INHALATION) at 08:25

## 2024-01-01 RX ADMIN — ALBUTEROL SULFATE 2.5 MG: 2.5 SOLUTION RESPIRATORY (INHALATION) at 12:21

## 2024-01-01 RX ADMIN — ACETYLCYSTEINE 2 ML: 100 SOLUTION ORAL; RESPIRATORY (INHALATION) at 01:21

## 2024-01-01 RX ADMIN — NYSTATIN 1000000 UNITS: 100000 SUSPENSION ORAL at 21:46

## 2024-01-01 RX ADMIN — POLYETHYLENE GLYCOL 3350 17 G: 17 POWDER, FOR SOLUTION ORAL at 09:12

## 2024-01-01 RX ADMIN — FUROSEMIDE 80 MG: 40 TABLET ORAL at 15:59

## 2024-01-01 RX ADMIN — PIPERACILLIN AND TAZOBACTAM 4.5 G: 4; .5 INJECTION, POWDER, FOR SOLUTION INTRAVENOUS at 01:50

## 2024-01-01 RX ADMIN — CALCIUM CARBONATE (ANTACID) CHEW TAB 500 MG 1000 MG: 500 CHEW TAB at 16:35

## 2024-01-01 RX ADMIN — ALBUTEROL SULFATE 2.5 MG: 2.5 SOLUTION RESPIRATORY (INHALATION) at 20:38

## 2024-01-01 RX ADMIN — ALBUTEROL SULFATE 2.5 MG: 2.5 SOLUTION RESPIRATORY (INHALATION) at 05:00

## 2024-01-01 RX ADMIN — EMPAGLIFLOZIN 10 MG: 10 TABLET, FILM COATED ORAL at 08:23

## 2024-01-01 RX ADMIN — ALBUTEROL SULFATE 2.5 MG: 2.5 SOLUTION RESPIRATORY (INHALATION) at 12:48

## 2024-01-01 RX ADMIN — OXYCODONE HYDROCHLORIDE AND ACETAMINOPHEN 1000 MG: 500 TABLET ORAL at 08:47

## 2024-01-01 RX ADMIN — FLUOXETINE HYDROCHLORIDE 20 MG: 20 CAPSULE ORAL at 09:00

## 2024-01-01 RX ADMIN — ALBUTEROL SULFATE 2.5 MG: 2.5 SOLUTION RESPIRATORY (INHALATION) at 00:26

## 2024-01-01 RX ADMIN — ACETYLCYSTEINE 2 ML: 100 SOLUTION ORAL; RESPIRATORY (INHALATION) at 09:37

## 2024-01-01 RX ADMIN — ACETYLCYSTEINE 2 ML: 100 SOLUTION ORAL; RESPIRATORY (INHALATION) at 09:19

## 2024-01-01 RX ADMIN — ACETYLCYSTEINE 2 ML: 100 SOLUTION ORAL; RESPIRATORY (INHALATION) at 19:47

## 2024-01-01 RX ADMIN — ACETYLCYSTEINE 4 ML: 100 SOLUTION ORAL; RESPIRATORY (INHALATION) at 19:39

## 2024-01-01 RX ADMIN — FLUTICASONE FUROATE AND VILANTEROL TRIFENATATE 1 PUFF: 200; 25 POWDER RESPIRATORY (INHALATION) at 09:20

## 2024-01-01 RX ADMIN — NYSTATIN 500000 UNITS: 100000 SUSPENSION ORAL at 18:03

## 2024-01-01 RX ADMIN — ATORVASTATIN CALCIUM 40 MG: 40 TABLET, FILM COATED ORAL at 20:45

## 2024-01-01 RX ADMIN — FLUTICASONE FUROATE AND VILANTEROL TRIFENATATE 1 PUFF: 200; 25 POWDER RESPIRATORY (INHALATION) at 09:54

## 2024-01-01 RX ADMIN — WARFARIN SODIUM 2.5 MG: 2.5 TABLET ORAL at 18:42

## 2024-01-01 RX ADMIN — BARIUM SULFATE 5 ML: 400 SUSPENSION ORAL at 09:19

## 2024-01-01 RX ADMIN — BUMETANIDE 0.5 MG: 0.5 TABLET ORAL at 07:50

## 2024-01-01 RX ADMIN — FLUTICASONE FUROATE AND VILANTEROL TRIFENATATE 1 PUFF: 200; 25 POWDER RESPIRATORY (INHALATION) at 09:46

## 2024-01-01 RX ADMIN — ALBUTEROL SULFATE 2 PUFF: 90 AEROSOL, METERED RESPIRATORY (INHALATION) at 01:41

## 2024-01-01 RX ADMIN — ACETAZOLAMIDE 125 MG: 125 TABLET ORAL at 13:45

## 2024-01-01 RX ADMIN — ALBUTEROL SULFATE 2.5 MG: 2.5 SOLUTION RESPIRATORY (INHALATION) at 00:18

## 2024-01-01 RX ADMIN — PANTOPRAZOLE SODIUM 40 MG: 40 TABLET, DELAYED RELEASE ORAL at 09:47

## 2024-01-01 RX ADMIN — ALBUTEROL SULFATE 2.5 MG: 2.5 SOLUTION RESPIRATORY (INHALATION) at 08:49

## 2024-01-01 RX ADMIN — Medication 12.5 MG: at 08:03

## 2024-01-01 RX ADMIN — FLUTICASONE FUROATE AND VILANTEROL TRIFENATATE 1 PUFF: 200; 25 POWDER RESPIRATORY (INHALATION) at 08:00

## 2024-01-01 RX ADMIN — ALBUTEROL SULFATE 2.5 MG: 2.5 SOLUTION RESPIRATORY (INHALATION) at 09:37

## 2024-01-01 RX ADMIN — ALBUTEROL SULFATE 2 PUFF: 90 AEROSOL, METERED RESPIRATORY (INHALATION) at 09:41

## 2024-01-01 RX ADMIN — IPRATROPIUM BROMIDE AND ALBUTEROL SULFATE 3 ML: .5; 3 SOLUTION RESPIRATORY (INHALATION) at 08:29

## 2024-01-01 RX ADMIN — THIAMINE HCL TAB 100 MG 100 MG: 100 TAB at 13:30

## 2024-01-01 RX ADMIN — NYSTATIN 500000 UNITS: 100000 SUSPENSION ORAL at 12:47

## 2024-01-01 RX ADMIN — PANTOPRAZOLE SODIUM 40 MG: 40 TABLET, DELAYED RELEASE ORAL at 08:23

## 2024-01-01 RX ADMIN — ALBUTEROL SULFATE 2.5 MG: 2.5 SOLUTION RESPIRATORY (INHALATION) at 12:38

## 2024-01-01 RX ADMIN — PIPERACILLIN AND TAZOBACTAM 4.5 G: 4; .5 INJECTION, POWDER, FOR SOLUTION INTRAVENOUS at 22:38

## 2024-01-01 RX ADMIN — WARFARIN SODIUM 2.5 MG: 2.5 TABLET ORAL at 18:10

## 2024-01-01 RX ADMIN — ALBUTEROL SULFATE 2.5 MG: 2.5 SOLUTION RESPIRATORY (INHALATION) at 21:29

## 2024-01-01 RX ADMIN — Medication 6.25 MG: at 08:29

## 2024-01-01 RX ADMIN — POTASSIUM CHLORIDE 40 MEQ: 750 TABLET, EXTENDED RELEASE ORAL at 08:32

## 2024-01-01 RX ADMIN — ACETYLCYSTEINE 2 ML: 100 SOLUTION ORAL; RESPIRATORY (INHALATION) at 00:26

## 2024-01-01 RX ADMIN — POTASSIUM CHLORIDE 10 MEQ: 7.46 INJECTION, SOLUTION INTRAVENOUS at 13:42

## 2024-01-01 RX ADMIN — CALCIUM CARBONATE (ANTACID) CHEW TAB 500 MG 1000 MG: 500 CHEW TAB at 20:49

## 2024-01-01 RX ADMIN — POLYETHYLENE GLYCOL 3350 17 G: 17 POWDER, FOR SOLUTION ORAL at 09:30

## 2024-01-01 RX ADMIN — SPIRONOLACTONE 12.5 MG: 25 TABLET, FILM COATED ORAL at 09:52

## 2024-01-01 RX ADMIN — POLYETHYLENE GLYCOL 3350 17 G: 17 POWDER, FOR SOLUTION ORAL at 20:39

## 2024-01-01 RX ADMIN — KIT FOR THE PREPARATION OF TECHNETIUM TC 99M PENTETATE 65 MILLICURIE: 20 INJECTION, POWDER, LYOPHILIZED, FOR SOLUTION INTRAVENOUS; RESPIRATORY (INHALATION) at 14:55

## 2024-01-01 RX ADMIN — METHYLPREDNISOLONE SODIUM SUCCINATE 125 MG: 125 INJECTION, POWDER, FOR SOLUTION INTRAMUSCULAR; INTRAVENOUS at 14:53

## 2024-01-01 RX ADMIN — HEPARIN SODIUM 5000 UNITS: 5000 INJECTION, SOLUTION INTRAVENOUS; SUBCUTANEOUS at 23:37

## 2024-01-01 RX ADMIN — POLYETHYLENE GLYCOL 3350 17 G: 17 POWDER, FOR SOLUTION ORAL at 09:40

## 2024-01-01 RX ADMIN — PANTOPRAZOLE SODIUM 40 MG: 40 TABLET, DELAYED RELEASE ORAL at 08:32

## 2024-01-01 RX ADMIN — ACETYLCYSTEINE 2 ML: 100 SOLUTION ORAL; RESPIRATORY (INHALATION) at 20:29

## 2024-01-01 RX ADMIN — PANTOPRAZOLE SODIUM 40 MG: 40 TABLET, DELAYED RELEASE ORAL at 17:53

## 2024-01-01 RX ADMIN — ASPIRIN 81 MG CHEWABLE TABLET 81 MG: 81 TABLET CHEWABLE at 09:00

## 2024-01-01 RX ADMIN — FLUTICASONE FUROATE 1 PUFF: 100 POWDER RESPIRATORY (INHALATION) at 08:48

## 2024-01-01 RX ADMIN — EMPAGLIFLOZIN 10 MG: 10 TABLET, FILM COATED ORAL at 09:08

## 2024-01-01 RX ADMIN — OXYCODONE HYDROCHLORIDE AND ACETAMINOPHEN 1000 MG: 500 TABLET ORAL at 07:50

## 2024-01-01 RX ADMIN — ALBUTEROL SULFATE 2.5 MG: 2.5 SOLUTION RESPIRATORY (INHALATION) at 11:55

## 2024-01-01 RX ADMIN — FLUTICASONE FUROATE 1 PUFF: 100 POWDER RESPIRATORY (INHALATION) at 09:41

## 2024-01-01 RX ADMIN — OXYCODONE HYDROCHLORIDE AND ACETAMINOPHEN 1000 MG: 500 TABLET ORAL at 09:01

## 2024-01-01 RX ADMIN — PREDNISONE 40 MG: 20 TABLET ORAL at 08:32

## 2024-01-01 RX ADMIN — ALBUTEROL SULFATE 2.5 MG: 2.5 SOLUTION RESPIRATORY (INHALATION) at 08:20

## 2024-01-01 RX ADMIN — METHYLPREDNISOLONE SODIUM SUCCINATE 40 MG: 40 INJECTION, POWDER, FOR SOLUTION INTRAMUSCULAR; INTRAVENOUS at 06:19

## 2024-01-01 RX ADMIN — ACETYLCYSTEINE 2 ML: 100 SOLUTION ORAL; RESPIRATORY (INHALATION) at 20:38

## 2024-01-01 RX ADMIN — ACETYLCYSTEINE 2 ML: 100 SOLUTION ORAL; RESPIRATORY (INHALATION) at 19:31

## 2024-01-01 RX ADMIN — WARFARIN SODIUM 2.5 MG: 2.5 TABLET ORAL at 17:42

## 2024-01-01 RX ADMIN — ACETYLCYSTEINE 2 ML: 100 SOLUTION ORAL; RESPIRATORY (INHALATION) at 04:48

## 2024-01-01 RX ADMIN — NYSTATIN 1000000 UNITS: 100000 SUSPENSION ORAL at 11:42

## 2024-01-01 RX ADMIN — METHYLPREDNISOLONE SODIUM SUCCINATE 100 MG: 125 INJECTION, POWDER, FOR SOLUTION INTRAMUSCULAR; INTRAVENOUS at 09:40

## 2024-01-01 RX ADMIN — METOPROLOL SUCCINATE 25 MG: 25 TABLET, EXTENDED RELEASE ORAL at 09:42

## 2024-01-01 RX ADMIN — WARFARIN SODIUM 0.5 MG: 1 TABLET ORAL at 17:23

## 2024-01-01 RX ADMIN — AMPICILLIN SODIUM AND SULBACTAM SODIUM 3 G: 2; 1 INJECTION, POWDER, FOR SOLUTION INTRAMUSCULAR; INTRAVENOUS at 09:53

## 2024-01-01 RX ADMIN — UMECLIDINIUM 1 PUFF: 62.5 AEROSOL, POWDER ORAL at 09:46

## 2024-01-01 RX ADMIN — METOPROLOL SUCCINATE 25 MG: 25 TABLET, EXTENDED RELEASE ORAL at 07:46

## 2024-01-01 RX ADMIN — ALBUTEROL SULFATE 2.5 MG: 2.5 SOLUTION RESPIRATORY (INHALATION) at 11:54

## 2024-01-01 RX ADMIN — SODIUM CHLORIDE 500 ML: 9 INJECTION, SOLUTION INTRAVENOUS at 13:41

## 2024-01-01 RX ADMIN — ACETYLCYSTEINE 2 ML: 100 SOLUTION ORAL; RESPIRATORY (INHALATION) at 21:29

## 2024-01-01 RX ADMIN — METHYLPREDNISOLONE SODIUM SUCCINATE 40 MG: 40 INJECTION, POWDER, FOR SOLUTION INTRAMUSCULAR; INTRAVENOUS at 18:43

## 2024-01-01 RX ADMIN — POTASSIUM CHLORIDE 40 MEQ: 1.5 POWDER, FOR SOLUTION ORAL at 16:26

## 2024-01-01 RX ADMIN — ACETYLCYSTEINE 2 ML: 100 SOLUTION ORAL; RESPIRATORY (INHALATION) at 16:03

## 2024-01-01 RX ADMIN — ERGOCALCIFEROL 50000 UNITS: 1.25 CAPSULE, LIQUID FILLED ORAL at 08:03

## 2024-01-01 RX ADMIN — ALBUTEROL SULFATE 2.5 MG: 2.5 SOLUTION RESPIRATORY (INHALATION) at 16:37

## 2024-01-01 RX ADMIN — ALBUTEROL SULFATE 2.5 MG: 2.5 SOLUTION RESPIRATORY (INHALATION) at 23:54

## 2024-01-01 RX ADMIN — ACETYLCYSTEINE 4 ML: 100 SOLUTION ORAL; RESPIRATORY (INHALATION) at 20:43

## 2024-01-01 RX ADMIN — METOPROLOL SUCCINATE 25 MG: 25 TABLET, EXTENDED RELEASE ORAL at 08:18

## 2024-01-01 RX ADMIN — ALBUTEROL SULFATE 2.5 MG: 2.5 SOLUTION RESPIRATORY (INHALATION) at 04:55

## 2024-01-01 RX ADMIN — NYSTATIN 500000 UNITS: 100000 SUSPENSION ORAL at 13:22

## 2024-01-01 RX ADMIN — NYSTATIN 500000 UNITS: 100000 SUSPENSION ORAL at 07:50

## 2024-01-01 RX ADMIN — FLUTICASONE FUROATE AND VILANTEROL 1 PUFF: 200; 25 POWDER RESPIRATORY (INHALATION) at 21:06

## 2024-01-01 RX ADMIN — DOXYCYCLINE HYCLATE 100 MG: 100 CAPSULE ORAL at 21:05

## 2024-01-01 RX ADMIN — UMECLIDINIUM 1 PUFF: 62.5 AEROSOL, POWDER ORAL at 08:25

## 2024-01-01 RX ADMIN — WARFARIN SODIUM 1 MG: 1 TABLET ORAL at 17:35

## 2024-01-01 RX ADMIN — SODIUM CHLORIDE 250 MG: 9 INJECTION, SOLUTION INTRAVENOUS at 20:25

## 2024-01-01 RX ADMIN — ACETAZOLAMIDE 125 MG: 125 TABLET ORAL at 09:27

## 2024-01-01 RX ADMIN — FLUTICASONE FUROATE AND VILANTEROL TRIFENATATE 1 PUFF: 200; 25 POWDER RESPIRATORY (INHALATION) at 09:01

## 2024-01-01 RX ADMIN — PANTOPRAZOLE SODIUM 40 MG: 40 TABLET, DELAYED RELEASE ORAL at 20:40

## 2024-01-01 RX ADMIN — BUMETANIDE 1 MG: 0.25 INJECTION INTRAMUSCULAR; INTRAVENOUS at 20:32

## 2024-01-01 RX ADMIN — ALBUTEROL SULFATE 2.5 MG: 2.5 SOLUTION RESPIRATORY (INHALATION) at 05:12

## 2024-01-01 RX ADMIN — ALBUTEROL SULFATE 2.5 MG: 2.5 SOLUTION RESPIRATORY (INHALATION) at 08:51

## 2024-01-01 RX ADMIN — POTASSIUM CHLORIDE 10 MEQ: 7.46 INJECTION, SOLUTION INTRAVENOUS at 14:33

## 2024-01-01 RX ADMIN — ACETYLCYSTEINE 2 ML: 100 SOLUTION ORAL; RESPIRATORY (INHALATION) at 15:42

## 2024-01-01 RX ADMIN — ACETYLCYSTEINE 2 ML: 100 SOLUTION ORAL; RESPIRATORY (INHALATION) at 12:34

## 2024-01-01 RX ADMIN — ALBUTEROL SULFATE 2.5 MG: 2.5 SOLUTION RESPIRATORY (INHALATION) at 11:41

## 2024-01-01 RX ADMIN — ALBUTEROL SULFATE 2.5 MG: 2.5 SOLUTION RESPIRATORY (INHALATION) at 09:01

## 2024-01-01 RX ADMIN — Medication 12.5 MG: at 09:28

## 2024-01-01 RX ADMIN — ACETYLCYSTEINE 2 ML: 100 SOLUTION ORAL; RESPIRATORY (INHALATION) at 08:07

## 2024-01-01 RX ADMIN — POTASSIUM CHLORIDE 20 MEQ: 1.5 SOLUTION ORAL at 14:31

## 2024-01-01 RX ADMIN — FLUTICASONE FUROATE 1 PUFF: 100 POWDER RESPIRATORY (INHALATION) at 09:55

## 2024-01-01 RX ADMIN — ACETYLCYSTEINE 2 ML: 100 SOLUTION ORAL; RESPIRATORY (INHALATION) at 12:53

## 2024-01-01 RX ADMIN — PERFLUTREN 1.5 ML: 6.52 INJECTION, SUSPENSION INTRAVENOUS at 11:42

## 2024-01-01 RX ADMIN — ALBUTEROL SULFATE 2.5 MG: 2.5 SOLUTION RESPIRATORY (INHALATION) at 05:57

## 2024-01-01 RX ADMIN — ALBUTEROL SULFATE 2.5 MG: 2.5 SOLUTION RESPIRATORY (INHALATION) at 11:40

## 2024-01-01 RX ADMIN — BUMETANIDE 0.5 MG: 0.5 TABLET ORAL at 17:34

## 2024-01-01 RX ADMIN — ACETYLCYSTEINE 2 ML: 100 SOLUTION ORAL; RESPIRATORY (INHALATION) at 08:41

## 2024-01-01 RX ADMIN — FLUTICASONE FUROATE AND VILANTEROL TRIFENATATE 1 PUFF: 200; 25 POWDER RESPIRATORY (INHALATION) at 20:31

## 2024-01-01 RX ADMIN — ACETYLCYSTEINE 2 ML: 100 SOLUTION ORAL; RESPIRATORY (INHALATION) at 00:22

## 2024-01-01 RX ADMIN — ACETYLCYSTEINE 2 ML: 100 SOLUTION ORAL; RESPIRATORY (INHALATION) at 03:31

## 2024-01-01 RX ADMIN — FLUTICASONE FUROATE AND VILANTEROL TRIFENATATE 1 PUFF: 200; 25 POWDER RESPIRATORY (INHALATION) at 08:02

## 2024-01-01 RX ADMIN — BUMETANIDE 1 MG: 0.25 INJECTION INTRAMUSCULAR; INTRAVENOUS at 19:22

## 2024-01-01 RX ADMIN — IPRATROPIUM BROMIDE AND ALBUTEROL SULFATE 3 ML: .5; 3 SOLUTION RESPIRATORY (INHALATION) at 09:44

## 2024-01-01 RX ADMIN — AMOXICILLIN AND CLAVULANATE POTASSIUM 1 TABLET: 875; 125 TABLET, FILM COATED ORAL at 19:55

## 2024-01-01 RX ADMIN — NYSTATIN 500000 UNITS: 100000 SUSPENSION ORAL at 09:00

## 2024-01-01 RX ADMIN — LEVOFLOXACIN 500 MG: 250 TABLET, FILM COATED ORAL at 09:04

## 2024-01-01 RX ADMIN — ACETYLCYSTEINE 2 ML: 100 SOLUTION ORAL; RESPIRATORY (INHALATION) at 01:06

## 2024-01-01 RX ADMIN — ALBUTEROL SULFATE 2.5 MG: 2.5 SOLUTION RESPIRATORY (INHALATION) at 04:48

## 2024-01-01 RX ADMIN — WARFARIN SODIUM 2 MG: 2 TABLET ORAL at 21:32

## 2024-01-01 RX ADMIN — AMPICILLIN SODIUM AND SULBACTAM SODIUM 3 G: 2; 1 INJECTION, POWDER, FOR SOLUTION INTRAMUSCULAR; INTRAVENOUS at 01:50

## 2024-01-01 RX ADMIN — ACETYLCYSTEINE 2 ML: 100 SOLUTION ORAL; RESPIRATORY (INHALATION) at 15:36

## 2024-01-01 RX ADMIN — PANTOPRAZOLE SODIUM 40 MG: 40 TABLET, DELAYED RELEASE ORAL at 15:51

## 2024-01-01 RX ADMIN — ACETYLCYSTEINE 4 ML: 100 SOLUTION ORAL; RESPIRATORY (INHALATION) at 12:29

## 2024-01-01 RX ADMIN — ATORVASTATIN CALCIUM 40 MG: 40 TABLET, FILM COATED ORAL at 20:47

## 2024-01-01 RX ADMIN — ERGOCALCIFEROL 50000 UNITS: 1.25 CAPSULE, LIQUID FILLED ORAL at 07:50

## 2024-01-01 RX ADMIN — POTASSIUM CHLORIDE 40 MEQ: 750 TABLET, EXTENDED RELEASE ORAL at 13:10

## 2024-01-01 RX ADMIN — FLUOXETINE HYDROCHLORIDE 20 MG: 20 CAPSULE ORAL at 08:48

## 2024-01-01 RX ADMIN — ACETYLCYSTEINE 2 ML: 100 SOLUTION ORAL; RESPIRATORY (INHALATION) at 08:03

## 2024-01-01 RX ADMIN — ACETYLCYSTEINE 2 ML: 100 SOLUTION ORAL; RESPIRATORY (INHALATION) at 23:54

## 2024-01-01 RX ADMIN — PANTOPRAZOLE SODIUM 40 MG: 40 TABLET, DELAYED RELEASE ORAL at 09:29

## 2024-01-01 RX ADMIN — EMPAGLIFLOZIN 10 MG: 10 TABLET, FILM COATED ORAL at 07:47

## 2024-01-01 RX ADMIN — METHYLPREDNISOLONE SODIUM SUCCINATE 125 MG: 125 INJECTION, POWDER, FOR SOLUTION INTRAMUSCULAR; INTRAVENOUS at 23:51

## 2024-01-01 RX ADMIN — ALBUTEROL SULFATE 2.5 MG: 2.5 SOLUTION RESPIRATORY (INHALATION) at 21:20

## 2024-01-01 RX ADMIN — ALBUTEROL SULFATE 2.5 MG: 2.5 SOLUTION RESPIRATORY (INHALATION) at 17:00

## 2024-01-01 RX ADMIN — PIPERACILLIN AND TAZOBACTAM 4.5 G: 4; .5 INJECTION, POWDER, FOR SOLUTION INTRAVENOUS at 04:49

## 2024-01-01 RX ADMIN — IPRATROPIUM BROMIDE AND ALBUTEROL SULFATE 3 ML: .5; 3 SOLUTION RESPIRATORY (INHALATION) at 20:17

## 2024-01-01 RX ADMIN — ERGOCALCIFEROL 50000 UNITS: 1.25 CAPSULE, LIQUID FILLED ORAL at 09:47

## 2024-01-01 RX ADMIN — BUMETANIDE 3 MG: 2 TABLET ORAL at 16:40

## 2024-01-01 RX ADMIN — FUROSEMIDE 60 MG: 10 INJECTION, SOLUTION INTRAMUSCULAR; INTRAVENOUS at 09:30

## 2024-01-01 RX ADMIN — FLUTICASONE FUROATE AND VILANTEROL TRIFENATATE 1 PUFF: 200; 25 POWDER RESPIRATORY (INHALATION) at 09:53

## 2024-01-01 RX ADMIN — AMPICILLIN SODIUM AND SULBACTAM SODIUM 3 G: 2; 1 INJECTION, POWDER, FOR SOLUTION INTRAMUSCULAR; INTRAVENOUS at 09:50

## 2024-01-01 RX ADMIN — AMPICILLIN SODIUM AND SULBACTAM SODIUM 3 G: 2; 1 INJECTION, POWDER, FOR SOLUTION INTRAMUSCULAR; INTRAVENOUS at 21:02

## 2024-01-01 RX ADMIN — AMPICILLIN SODIUM AND SULBACTAM SODIUM 3 G: 2; 1 INJECTION, POWDER, FOR SOLUTION INTRAMUSCULAR; INTRAVENOUS at 14:45

## 2024-01-01 RX ADMIN — ALBUTEROL SULFATE 2.5 MG: 2.5 SOLUTION RESPIRATORY (INHALATION) at 13:56

## 2024-01-01 RX ADMIN — AMPICILLIN SODIUM AND SULBACTAM SODIUM 3 G: 2; 1 INJECTION, POWDER, FOR SOLUTION INTRAMUSCULAR; INTRAVENOUS at 13:33

## 2024-01-01 RX ADMIN — ALBUTEROL SULFATE 2 PUFF: 90 AEROSOL, METERED RESPIRATORY (INHALATION) at 09:13

## 2024-01-01 RX ADMIN — ATORVASTATIN CALCIUM 40 MG: 40 TABLET, FILM COATED ORAL at 21:14

## 2024-01-01 RX ADMIN — BUMETANIDE 0.5 MG: 0.5 TABLET ORAL at 13:01

## 2024-01-01 RX ADMIN — ALBUTEROL SULFATE 2.5 MG: 2.5 SOLUTION RESPIRATORY (INHALATION) at 08:07

## 2024-01-01 RX ADMIN — ASPIRIN 81 MG: 81 TABLET, COATED ORAL at 08:17

## 2024-01-01 RX ADMIN — FLUOXETINE HYDROCHLORIDE 20 MG: 20 CAPSULE ORAL at 09:50

## 2024-01-01 RX ADMIN — ACETYLCYSTEINE 2 ML: 100 SOLUTION ORAL; RESPIRATORY (INHALATION) at 20:41

## 2024-01-01 RX ADMIN — ACETYLCYSTEINE 2 ML: 100 SOLUTION ORAL; RESPIRATORY (INHALATION) at 17:00

## 2024-01-01 RX ADMIN — VORICONAZOLE 200 MG: 200 TABLET ORAL at 10:26

## 2024-01-01 RX ADMIN — AMPICILLIN SODIUM AND SULBACTAM SODIUM 3 G: 2; 1 INJECTION, POWDER, FOR SOLUTION INTRAMUSCULAR; INTRAVENOUS at 02:16

## 2024-01-01 RX ADMIN — ACETYLCYSTEINE 2 ML: 100 SOLUTION ORAL; RESPIRATORY (INHALATION) at 07:46

## 2024-01-01 RX ADMIN — NYSTATIN 1000000 UNITS: 100000 SUSPENSION ORAL at 16:40

## 2024-01-01 RX ADMIN — NYSTATIN 500000 UNITS: 100000 SUSPENSION ORAL at 08:04

## 2024-01-01 RX ADMIN — ACETYLCYSTEINE 2 ML: 100 SOLUTION ORAL; RESPIRATORY (INHALATION) at 00:55

## 2024-01-01 RX ADMIN — METHYLPREDNISOLONE SODIUM SUCCINATE 62.5 MG: 125 INJECTION, POWDER, FOR SOLUTION INTRAMUSCULAR; INTRAVENOUS at 17:26

## 2024-01-01 RX ADMIN — UMECLIDINIUM 1 PUFF: 62.5 AEROSOL, POWDER ORAL at 09:21

## 2024-01-01 RX ADMIN — ACETYLCYSTEINE 2 ML: 100 SOLUTION ORAL; RESPIRATORY (INHALATION) at 15:51

## 2024-01-01 RX ADMIN — POLYETHYLENE GLYCOL 3350 17 G: 17 POWDER, FOR SOLUTION ORAL at 21:14

## 2024-01-01 RX ADMIN — FLUOXETINE HYDROCHLORIDE 20 MG: 20 CAPSULE ORAL at 08:24

## 2024-01-01 RX ADMIN — NYSTATIN 500000 UNITS: 100000 SUSPENSION ORAL at 16:35

## 2024-01-01 RX ADMIN — ALBUTEROL SULFATE 2.5 MG: 2.5 SOLUTION RESPIRATORY (INHALATION) at 08:41

## 2024-01-01 RX ADMIN — EMPAGLIFLOZIN 10 MG: 10 TABLET, FILM COATED ORAL at 08:30

## 2024-01-01 RX ADMIN — ALBUTEROL SULFATE 2.5 MG: 2.5 SOLUTION RESPIRATORY (INHALATION) at 19:30

## 2024-01-01 RX ADMIN — NYSTATIN 500000 UNITS: 100000 SUSPENSION ORAL at 12:55

## 2024-01-01 RX ADMIN — AMPICILLIN SODIUM AND SULBACTAM SODIUM 3 G: 2; 1 INJECTION, POWDER, FOR SOLUTION INTRAMUSCULAR; INTRAVENOUS at 08:33

## 2024-01-01 RX ADMIN — ACETYLCYSTEINE 2 ML: 100 SOLUTION ORAL; RESPIRATORY (INHALATION) at 11:41

## 2024-01-01 RX ADMIN — POTASSIUM & SODIUM PHOSPHATES POWDER PACK 280-160-250 MG 1 PACKET: 280-160-250 PACK at 03:31

## 2024-01-01 RX ADMIN — ACETYLCYSTEINE 4 ML: 100 SOLUTION ORAL; RESPIRATORY (INHALATION) at 15:54

## 2024-01-01 RX ADMIN — OXYCODONE HYDROCHLORIDE AND ACETAMINOPHEN 1000 MG: 500 TABLET ORAL at 07:47

## 2024-01-01 RX ADMIN — ALBUTEROL SULFATE 2.5 MG: 2.5 SOLUTION RESPIRATORY (INHALATION) at 07:46

## 2024-01-01 RX ADMIN — NYSTATIN 500000 UNITS: 100000 SUSPENSION ORAL at 20:45

## 2024-01-01 RX ADMIN — NYSTATIN 500000 UNITS: 100000 SUSPENSION ORAL at 20:12

## 2024-01-01 RX ADMIN — UMECLIDINIUM 1 PUFF: 62.5 AEROSOL, POWDER ORAL at 09:55

## 2024-01-01 RX ADMIN — ACETYLCYSTEINE 2 ML: 100 SOLUTION ORAL; RESPIRATORY (INHALATION) at 11:54

## 2024-01-01 RX ADMIN — PANTOPRAZOLE SODIUM 40 MG: 40 TABLET, DELAYED RELEASE ORAL at 21:46

## 2024-01-01 RX ADMIN — POTASSIUM CHLORIDE 40 MEQ: 1.5 POWDER, FOR SOLUTION ORAL at 03:31

## 2024-01-01 RX ADMIN — FUROSEMIDE 40 MG: 40 TABLET ORAL at 17:23

## 2024-01-01 RX ADMIN — ACETYLCYSTEINE 2 ML: 100 SOLUTION ORAL; RESPIRATORY (INHALATION) at 23:29

## 2024-01-01 RX ADMIN — SODIUM CHLORIDE 500 ML: 9 INJECTION, SOLUTION INTRAVENOUS at 20:00

## 2024-01-01 RX ADMIN — ALBUTEROL SULFATE 2.5 MG: 2.5 SOLUTION RESPIRATORY (INHALATION) at 15:42

## 2024-01-01 RX ADMIN — SODIUM CHLORIDE 3 ML: 9 INJECTION INTRAMUSCULAR; INTRAVENOUS; SUBCUTANEOUS at 10:30

## 2024-01-01 RX ADMIN — PANTOPRAZOLE SODIUM 40 MG: 40 TABLET, DELAYED RELEASE ORAL at 18:03

## 2024-01-01 RX ADMIN — ALBUTEROL SULFATE 2.5 MG: 2.5 SOLUTION RESPIRATORY (INHALATION) at 12:13

## 2024-01-01 RX ADMIN — PANTOPRAZOLE SODIUM 40 MG: 40 TABLET, DELAYED RELEASE ORAL at 20:14

## 2024-01-01 RX ADMIN — FUROSEMIDE 60 MG: 10 INJECTION, SOLUTION INTRAMUSCULAR; INTRAVENOUS at 20:47

## 2024-01-01 RX ADMIN — BUMETANIDE 0.5 MG: 0.5 TABLET ORAL at 15:51

## 2024-01-01 RX ADMIN — ACETYLCYSTEINE 2 ML: 100 SOLUTION ORAL; RESPIRATORY (INHALATION) at 12:50

## 2024-01-01 RX ADMIN — NYSTATIN 500000 UNITS: 100000 SUSPENSION ORAL at 12:31

## 2024-01-01 RX ADMIN — ALBUTEROL SULFATE 2.5 MG: 2.5 SOLUTION RESPIRATORY (INHALATION) at 20:21

## 2024-01-01 RX ADMIN — BUMETANIDE 1 MG: 0.25 INJECTION INTRAMUSCULAR; INTRAVENOUS at 07:59

## 2024-01-01 RX ADMIN — ACETYLCYSTEINE 2 ML: 100 SOLUTION ORAL; RESPIRATORY (INHALATION) at 04:21

## 2024-01-01 RX ADMIN — POTASSIUM CHLORIDE 40 MEQ: 1500 TABLET, EXTENDED RELEASE ORAL at 15:50

## 2024-01-01 RX ADMIN — ALBUTEROL SULFATE 2.5 MG: 2.5 SOLUTION RESPIRATORY (INHALATION) at 04:52

## 2024-01-01 RX ADMIN — METOPROLOL SUCCINATE 25 MG: 25 TABLET, EXTENDED RELEASE ORAL at 08:47

## 2024-01-01 RX ADMIN — POLYETHYLENE GLYCOL 3350 17 G: 17 POWDER, FOR SOLUTION ORAL at 20:17

## 2024-01-01 RX ADMIN — VORICONAZOLE 200 MG: 200 TABLET ORAL at 08:32

## 2024-01-01 RX ADMIN — UMECLIDINIUM 1 PUFF: 62.5 AEROSOL, POWDER ORAL at 07:48

## 2024-01-01 ASSESSMENT — ACTIVITIES OF DAILY LIVING (ADL)
DIFFICULTY_EATING/SWALLOWING: YES
ADLS_ACUITY_SCORE: 38
ADLS_ACUITY_SCORE: 35
ADLS_ACUITY_SCORE: 37
WEAR_GLASSES_OR_BLIND: YES
ADLS_ACUITY_SCORE: 37
ADLS_ACUITY_SCORE: 37
ADLS_ACUITY_SCORE: 35
ADLS_ACUITY_SCORE: 37
ADLS_ACUITY_SCORE: 38
ADLS_ACUITY_SCORE: 37
ADLS_ACUITY_SCORE: 35
ADLS_ACUITY_SCORE: 37
ADLS_ACUITY_SCORE: 37
ADLS_ACUITY_SCORE: 35
EATING/SWALLOWING: OTHER (SEE COMMENTS)
ADLS_ACUITY_SCORE: 33
ADLS_ACUITY_SCORE: 37
ADLS_ACUITY_SCORE: 35
ADLS_ACUITY_SCORE: 38
ADLS_ACUITY_SCORE: 37
ADLS_ACUITY_SCORE: 37
ADLS_ACUITY_SCORE: 35
ADLS_ACUITY_SCORE: 33
ADLS_ACUITY_SCORE: 35
ADLS_ACUITY_SCORE: 37
ADLS_ACUITY_SCORE: 38
ADLS_ACUITY_SCORE: 35
ADLS_ACUITY_SCORE: 37
ADLS_ACUITY_SCORE: 37
TOILETING_ISSUES: NO
ADLS_ACUITY_SCORE: 35
ADLS_ACUITY_SCORE: 33
ADLS_ACUITY_SCORE: 36
DEPENDENT_IADLS:: INDEPENDENT;TRANSPORTATION
ADLS_ACUITY_SCORE: 35
CHANGE_IN_FUNCTIONAL_STATUS_SINCE_ONSET_OF_CURRENT_ILLNESS/INJURY: YES
ADLS_ACUITY_SCORE: 35
ADLS_ACUITY_SCORE: 38
ADLS_ACUITY_SCORE: 37
WALKING_OR_CLIMBING_STAIRS_DIFFICULTY: YES
ADLS_ACUITY_SCORE: 35
ADLS_ACUITY_SCORE: 33
ADLS_ACUITY_SCORE: 36
ADLS_ACUITY_SCORE: 37
ADLS_ACUITY_SCORE: 38
ADLS_ACUITY_SCORE: 37
EQUIPMENT_CURRENTLY_USED_AT_HOME: WALKER, ROLLING
ADLS_ACUITY_SCORE: 35
ADLS_ACUITY_SCORE: 33
ADLS_ACUITY_SCORE: 37
ADLS_ACUITY_SCORE: 35
ADLS_ACUITY_SCORE: 37
ADLS_ACUITY_SCORE: 35
ADLS_ACUITY_SCORE: 37
ADLS_ACUITY_SCORE: 33
ADLS_ACUITY_SCORE: 36
ADLS_ACUITY_SCORE: 36
ADLS_ACUITY_SCORE: 35
ADLS_ACUITY_SCORE: 35
ADLS_ACUITY_SCORE: 37
ADLS_ACUITY_SCORE: 38
ADLS_ACUITY_SCORE: 36
ADLS_ACUITY_SCORE: 37
ADLS_ACUITY_SCORE: 35
ADLS_ACUITY_SCORE: 37
ADLS_ACUITY_SCORE: 35
ADLS_ACUITY_SCORE: 37
ADLS_ACUITY_SCORE: 35
ADLS_ACUITY_SCORE: 38
ADLS_ACUITY_SCORE: 35
ADLS_ACUITY_SCORE: 38
ADLS_ACUITY_SCORE: 37
ADLS_ACUITY_SCORE: 35
ADLS_ACUITY_SCORE: 37
ADLS_ACUITY_SCORE: 38
ADLS_ACUITY_SCORE: 35
ADLS_ACUITY_SCORE: 37
ADLS_ACUITY_SCORE: 34
ADLS_ACUITY_SCORE: 37
ADLS_ACUITY_SCORE: 35
ADLS_ACUITY_SCORE: 37
ADLS_ACUITY_SCORE: 36
ADLS_ACUITY_SCORE: 37
ADLS_ACUITY_SCORE: 37
ADLS_ACUITY_SCORE: 36
ADLS_ACUITY_SCORE: 35
ADLS_ACUITY_SCORE: 37
ADLS_ACUITY_SCORE: 37
ADLS_ACUITY_SCORE: 35
ADLS_ACUITY_SCORE: 37
ADLS_ACUITY_SCORE: 35
ADLS_ACUITY_SCORE: 37
ADLS_ACUITY_SCORE: 33
ADLS_ACUITY_SCORE: 38
ADLS_ACUITY_SCORE: 35
ADLS_ACUITY_SCORE: 35
ADLS_ACUITY_SCORE: 37
ADLS_ACUITY_SCORE: 34
ADLS_ACUITY_SCORE: 33
ADLS_ACUITY_SCORE: 35
ADLS_ACUITY_SCORE: 35
ADLS_ACUITY_SCORE: 37
ADLS_ACUITY_SCORE: 35
ADLS_ACUITY_SCORE: 37
ADLS_ACUITY_SCORE: 35
ADLS_ACUITY_SCORE: 37
ADLS_ACUITY_SCORE: 35
ADLS_ACUITY_SCORE: 37
ADLS_ACUITY_SCORE: 35
ADLS_ACUITY_SCORE: 37
ADLS_ACUITY_SCORE: 36
ADLS_ACUITY_SCORE: 37
ADLS_ACUITY_SCORE: 34
EQUIPMENT_CURRENTLY_USED_AT_HOME: WALKER, ROLLING
ADLS_ACUITY_SCORE: 36
FALL_HISTORY_WITHIN_LAST_SIX_MONTHS: NO
ADLS_ACUITY_SCORE: 37
FALL_HISTORY_WITHIN_LAST_SIX_MONTHS: NO
ADLS_ACUITY_SCORE: 35
ADLS_ACUITY_SCORE: 37
ADLS_ACUITY_SCORE: 38
ADLS_ACUITY_SCORE: 34
ADLS_ACUITY_SCORE: 36
ADLS_ACUITY_SCORE: 37
ADLS_ACUITY_SCORE: 37
ADLS_ACUITY_SCORE: 35
ADLS_ACUITY_SCORE: 37
ADLS_ACUITY_SCORE: 38
ADLS_ACUITY_SCORE: 35
ADLS_ACUITY_SCORE: 38
ADLS_ACUITY_SCORE: 37
ADLS_ACUITY_SCORE: 34
ADLS_ACUITY_SCORE: 38
ADLS_ACUITY_SCORE: 35
ADLS_ACUITY_SCORE: 37
ADLS_ACUITY_SCORE: 38
ADLS_ACUITY_SCORE: 38
ADLS_ACUITY_SCORE: 36
USE_OF_HEARING_ASSISTIVE_DEVICES: BILATERAL HEARING AIDS
ADLS_ACUITY_SCORE: 35
ADLS_ACUITY_SCORE: 38
ADLS_ACUITY_SCORE: 37
ADLS_ACUITY_SCORE: 38
ADLS_ACUITY_SCORE: 37
ADLS_ACUITY_SCORE: 35
ADLS_ACUITY_SCORE: 37
ADLS_ACUITY_SCORE: 37
ADLS_ACUITY_SCORE: 34
ADLS_ACUITY_SCORE: 35
ADLS_ACUITY_SCORE: 36
ADLS_ACUITY_SCORE: 37
ADLS_ACUITY_SCORE: 35
ADLS_ACUITY_SCORE: 37
ADLS_ACUITY_SCORE: 36
ADLS_ACUITY_SCORE: 35
ADLS_ACUITY_SCORE: 37
ADLS_ACUITY_SCORE: 35
ADLS_ACUITY_SCORE: 37
ADLS_ACUITY_SCORE: 36
ADLS_ACUITY_SCORE: 37
ADLS_ACUITY_SCORE: 38
ADLS_ACUITY_SCORE: 37
ADLS_ACUITY_SCORE: 35
ADLS_ACUITY_SCORE: 37
ADLS_ACUITY_SCORE: 38
ADLS_ACUITY_SCORE: 37
ADLS_ACUITY_SCORE: 35
ADLS_ACUITY_SCORE: 37
ADLS_ACUITY_SCORE: 38
ADLS_ACUITY_SCORE: 33
ADLS_ACUITY_SCORE: 35
ADLS_ACUITY_SCORE: 36
ADLS_ACUITY_SCORE: 37
ADLS_ACUITY_SCORE: 36
ADLS_ACUITY_SCORE: 37
ADLS_ACUITY_SCORE: 38
ADLS_ACUITY_SCORE: 37
ADLS_ACUITY_SCORE: 38
ADLS_ACUITY_SCORE: 37
ADLS_ACUITY_SCORE: 37
ADLS_ACUITY_SCORE: 35
ADLS_ACUITY_SCORE: 35
ADLS_ACUITY_SCORE: 37
ADLS_ACUITY_SCORE: 35
ADLS_ACUITY_SCORE: 35
ADLS_ACUITY_SCORE: 37
ADLS_ACUITY_SCORE: 37
ADLS_ACUITY_SCORE: 38
ADLS_ACUITY_SCORE: 35
ADLS_ACUITY_SCORE: 37
ADLS_ACUITY_SCORE: 33
ADLS_ACUITY_SCORE: 34
ADLS_ACUITY_SCORE: 37
ADLS_ACUITY_SCORE: 35
ADLS_ACUITY_SCORE: 37
ADLS_ACUITY_SCORE: 37
ADLS_ACUITY_SCORE: 36
ADLS_ACUITY_SCORE: 35
ADLS_ACUITY_SCORE: 33
ADLS_ACUITY_SCORE: 37
ADLS_ACUITY_SCORE: 37
ADLS_ACUITY_SCORE: 35
ADLS_ACUITY_SCORE: 39
CHANGE_IN_FUNCTIONAL_STATUS_SINCE_ONSET_OF_CURRENT_ILLNESS/INJURY: YES
ADLS_ACUITY_SCORE: 37
ADLS_ACUITY_SCORE: 38
ADLS_ACUITY_SCORE: 35
ADLS_ACUITY_SCORE: 34
ADLS_ACUITY_SCORE: 35
ADLS_ACUITY_SCORE: 35
ADLS_ACUITY_SCORE: 37
ADLS_ACUITY_SCORE: 33
ADLS_ACUITY_SCORE: 38
TOILETING_ISSUES: NO
HEARING_DIFFICULTY_OR_DEAF: YES
ADLS_ACUITY_SCORE: 36
ADLS_ACUITY_SCORE: 37
ADLS_ACUITY_SCORE: 38
ADLS_ACUITY_SCORE: 37
ADLS_ACUITY_SCORE: 35
ADLS_ACUITY_SCORE: 35
ADLS_ACUITY_SCORE: 37
ADLS_ACUITY_SCORE: 35
ADLS_ACUITY_SCORE: 37
ADLS_ACUITY_SCORE: 36
ADLS_ACUITY_SCORE: 36
ADLS_ACUITY_SCORE: 37
ADLS_ACUITY_SCORE: 36
ADLS_ACUITY_SCORE: 37
ADLS_ACUITY_SCORE: 37
ADLS_ACUITY_SCORE: 35
ADLS_ACUITY_SCORE: 34
ADLS_ACUITY_SCORE: 37
ADLS_ACUITY_SCORE: 36
ADLS_ACUITY_SCORE: 37
ADLS_ACUITY_SCORE: 38
ADLS_ACUITY_SCORE: 36
ADLS_ACUITY_SCORE: 37
ADLS_ACUITY_SCORE: 35
ADLS_ACUITY_SCORE: 35
ADLS_ACUITY_SCORE: 37
ADLS_ACUITY_SCORE: 34
ADLS_ACUITY_SCORE: 35
ADLS_ACUITY_SCORE: 37
ADLS_ACUITY_SCORE: 38
ADLS_ACUITY_SCORE: 36
ADLS_ACUITY_SCORE: 38
ADLS_ACUITY_SCORE: 36
DRESSING/BATHING_DIFFICULTY: NO
ADLS_ACUITY_SCORE: 35
ADLS_ACUITY_SCORE: 37
ADLS_ACUITY_SCORE: 37
ADLS_ACUITY_SCORE: 34
ADLS_ACUITY_SCORE: 36
ADLS_ACUITY_SCORE: 35
ADLS_ACUITY_SCORE: 34
ADLS_ACUITY_SCORE: 37
ADLS_ACUITY_SCORE: 39
DRESSING/BATHING_DIFFICULTY: NO
ADLS_ACUITY_SCORE: 37
ADLS_ACUITY_SCORE: 35
ADLS_ACUITY_SCORE: 34
ADLS_ACUITY_SCORE: 35
ADLS_ACUITY_SCORE: 37
ADLS_ACUITY_SCORE: 35
DOING_ERRANDS_INDEPENDENTLY_DIFFICULTY: NO
ADLS_ACUITY_SCORE: 37
ADLS_ACUITY_SCORE: 38
ADLS_ACUITY_SCORE: 37
ADLS_ACUITY_SCORE: 34
ADLS_ACUITY_SCORE: 35
ADLS_ACUITY_SCORE: 37
ADLS_ACUITY_SCORE: 35
ADLS_ACUITY_SCORE: 37
ADLS_ACUITY_SCORE: 37
ADLS_ACUITY_SCORE: 35
ADLS_ACUITY_SCORE: 38
ADLS_ACUITY_SCORE: 37
ADLS_ACUITY_SCORE: 35
ADLS_ACUITY_SCORE: 33
ADLS_ACUITY_SCORE: 35
ADLS_ACUITY_SCORE: 38
ADLS_ACUITY_SCORE: 34
ADLS_ACUITY_SCORE: 35
ADLS_ACUITY_SCORE: 35
ADLS_ACUITY_SCORE: 37
ADLS_ACUITY_SCORE: 36
ADLS_ACUITY_SCORE: 33
ADLS_ACUITY_SCORE: 34
ADLS_ACUITY_SCORE: 37
ADLS_ACUITY_SCORE: 35
ADLS_ACUITY_SCORE: 37
ADLS_ACUITY_SCORE: 37
ADLS_ACUITY_SCORE: 33
ADLS_ACUITY_SCORE: 35
ADLS_ACUITY_SCORE: 35
ADLS_ACUITY_SCORE: 37
ADLS_ACUITY_SCORE: 36
ADLS_ACUITY_SCORE: 34
ADLS_ACUITY_SCORE: 37
DEPENDENT_IADLS:: CLEANING;COOKING;LAUNDRY;MEAL PREPARATION;SHOPPING
ADLS_ACUITY_SCORE: 37
ADLS_ACUITY_SCORE: 37
ADLS_ACUITY_SCORE: 36
ADLS_ACUITY_SCORE: 33
ADLS_ACUITY_SCORE: 36
ADLS_ACUITY_SCORE: 35
ADLS_ACUITY_SCORE: 37
ADLS_ACUITY_SCORE: 35
ADLS_ACUITY_SCORE: 35
ADLS_ACUITY_SCORE: 37
ADLS_ACUITY_SCORE: 38
ADLS_ACUITY_SCORE: 35
ADLS_ACUITY_SCORE: 37
ADLS_ACUITY_SCORE: 37
ADLS_ACUITY_SCORE: 35
ADLS_ACUITY_SCORE: 35
ADLS_ACUITY_SCORE: 38
ADLS_ACUITY_SCORE: 37
ADLS_ACUITY_SCORE: 37
ADLS_ACUITY_SCORE: 35
ADLS_ACUITY_SCORE: 33
ADLS_ACUITY_SCORE: 35
ADLS_ACUITY_SCORE: 37
ADLS_ACUITY_SCORE: 35
ADLS_ACUITY_SCORE: 35
ADLS_ACUITY_SCORE: 38
ADLS_ACUITY_SCORE: 35
ADLS_ACUITY_SCORE: 35
ADLS_ACUITY_SCORE: 37
ADLS_ACUITY_SCORE: 35
ADLS_ACUITY_SCORE: 33
ADLS_ACUITY_SCORE: 34
ADLS_ACUITY_SCORE: 37
ADLS_ACUITY_SCORE: 33
ADLS_ACUITY_SCORE: 35
ADLS_ACUITY_SCORE: 37
ADLS_ACUITY_SCORE: 36
ADLS_ACUITY_SCORE: 34
ADLS_ACUITY_SCORE: 37
ADLS_ACUITY_SCORE: 35
ADLS_ACUITY_SCORE: 37
ADLS_ACUITY_SCORE: 35
ADLS_ACUITY_SCORE: 37
ADLS_ACUITY_SCORE: 36
ADLS_ACUITY_SCORE: 35
ADLS_ACUITY_SCORE: 35
ADLS_ACUITY_SCORE: 37
ADLS_ACUITY_SCORE: 35
ADLS_ACUITY_SCORE: 38
ADLS_ACUITY_SCORE: 37
DOING_ERRANDS_INDEPENDENTLY_DIFFICULTY: NO
ADLS_ACUITY_SCORE: 37
ADLS_ACUITY_SCORE: 37
ADLS_ACUITY_SCORE: 34
ADLS_ACUITY_SCORE: 37
ADLS_ACUITY_SCORE: 36
ADLS_ACUITY_SCORE: 35
ADLS_ACUITY_SCORE: 37
ADLS_ACUITY_SCORE: 37
ADLS_ACUITY_SCORE: 36
ADLS_ACUITY_SCORE: 33
ADLS_ACUITY_SCORE: 37
ADLS_ACUITY_SCORE: 35
ADLS_ACUITY_SCORE: 33
ADLS_ACUITY_SCORE: 37
ADLS_ACUITY_SCORE: 35
ADLS_ACUITY_SCORE: 37
ADLS_ACUITY_SCORE: 35
ADLS_ACUITY_SCORE: 37
ADLS_ACUITY_SCORE: 34
ADLS_ACUITY_SCORE: 37
ADLS_ACUITY_SCORE: 35
ADLS_ACUITY_SCORE: 38
ADLS_ACUITY_SCORE: 35
ADLS_ACUITY_SCORE: 37
ADLS_ACUITY_SCORE: 35
ADLS_ACUITY_SCORE: 38
ADLS_ACUITY_SCORE: 33
ADLS_ACUITY_SCORE: 37
ADLS_ACUITY_SCORE: 38
ADLS_ACUITY_SCORE: 32
ADLS_ACUITY_SCORE: 37
ADLS_ACUITY_SCORE: 37
ADLS_ACUITY_SCORE: 35
ADLS_ACUITY_SCORE: 37
DESCRIBE_HEARING_LOSS: BILATERAL HEARING LOSS
ADLS_ACUITY_SCORE: 37
ADLS_ACUITY_SCORE: 36
ADLS_ACUITY_SCORE: 38
ADLS_ACUITY_SCORE: 37
ADLS_ACUITY_SCORE: 37
WERE_AUXILIARY_AIDS_OFFERED?: NO
ADLS_ACUITY_SCORE: 33
ADLS_ACUITY_SCORE: 38
WALKING_OR_CLIMBING_STAIRS: AMBULATION DIFFICULTY, REQUIRES EQUIPMENT
ADLS_ACUITY_SCORE: 35
ADLS_ACUITY_SCORE: 37
ADLS_ACUITY_SCORE: 36
PATIENT'S_PREFERRED_MEANS_OF_COMMUNICATION: ENGLISH SPEAKER WITH HEARING LOSS, NO SPEECH PROBLEMS.
ADLS_ACUITY_SCORE: 35
CONCENTRATING,_REMEMBERING_OR_MAKING_DECISIONS_DIFFICULTY: YES
ADLS_ACUITY_SCORE: 37
ADLS_ACUITY_SCORE: 36
ADLS_ACUITY_SCORE: 35
ADLS_ACUITY_SCORE: 36
ADLS_ACUITY_SCORE: 35
ADLS_ACUITY_SCORE: 37
ADLS_ACUITY_SCORE: 35
ADLS_ACUITY_SCORE: 37
ADLS_ACUITY_SCORE: 35
DIFFICULTY_COMMUNICATING: NO
ADLS_ACUITY_SCORE: 38

## 2024-01-01 ASSESSMENT — COLUMBIA-SUICIDE SEVERITY RATING SCALE - C-SSRS
2. HAVE YOU ACTUALLY HAD ANY THOUGHTS OF KILLING YOURSELF IN THE PAST MONTH?: NO
1. IN THE PAST MONTH, HAVE YOU WISHED YOU WERE DEAD OR WISHED YOU COULD GO TO SLEEP AND NOT WAKE UP?: NO
6. HAVE YOU EVER DONE ANYTHING, STARTED TO DO ANYTHING, OR PREPARED TO DO ANYTHING TO END YOUR LIFE?: NO

## 2024-01-02 NOTE — PROGRESS NOTES
ANTICOAGULATION  MANAGEMENT-Home Monitor Managed by Exception    Buck Sinclair 78 year old male is on warfarin with therapeutic INR result. (Goal INR 2.0-3.0)    Recent labs: (last 7 days)     01/02/24  0000   INR 2.8       Previous INR was Therapeutic  Medication, diet, health changes since last INR:chart reviewed; none identified  Contacted within the last 12 weeks by phone on  10/24/23    Last ACC referral date: 09/12/2023      LUCAS Jane was NOT contacted regarding therapeutic result today per home monitoring policy manage by exception agreement.   Current warfarin dose is to be continued:     Summary  As of 1/2/2024      Full warfarin instructions:  2.5 mg every day   Next INR check:  1/16/2024             ?   Desire Cortez RN  Anticoagulation Clinic  1/2/2024    _______________________________________________________________________     Anticoagulation Episode Summary       Current INR goal:  2.0-3.0   TTR:  76.8% (11.6 mo)   Target end date:  Indefinite   Send INR reminders to:  ANTICOAG HOME MONITORING    Indications    Persistent Atrial Fibrillation (Resolved) [I48.91]  Paroxysmal atrial fibrillation (H) [I48.0]  Persistent atrial fibrillation (H) [I48.19]             Comments:  Home monitor ( [employer] )managed by exception             Anticoagulation Care Providers       Provider Role Specialty Phone number    Erica Hansen APRN CNP Referring Cardiovascular Disease [phone]    Domo Garrett MD Referring Cardiovascular Disease [phone]    Everett Renee MD  Cardiovascular Disease [phone]

## 2024-01-03 NOTE — TELEPHONE ENCOUNTER
Patient /wife is calling to discuss 4.6# wt gain over the New Year's holiday. Reporting LE edema in ankles, legs, feet at the end of the day, resolves overnight. Patient also reports abdominal bloating. Currently using Bumex 2mg bid. No missed doses. Creat was 1.63 on 11/14/23    Jessica Barrera CNP any additional recommendations at this time?    Reinforced dietary and fluid restrictions with the wife today.  Puja SALDAÑA RN BSN, CHFN, PCCN-K

## 2024-01-03 NOTE — TELEPHONE ENCOUNTER
Pt and wife informed of recs. They stated understanding and agreement and will call Friday if no improvement.    Kristie WATTERS RN  BSN

## 2024-01-03 NOTE — TELEPHONE ENCOUNTER
[facility] Call Center    Phone Message    May a detailed message be left on voicemail: yes     Reason for Call: Other: Pt has gained weight as listed.  On Christmas Eve he weighed 230 pounds, on New Years Yarelis 232.2 pounds and today 1.3.24 he is 234.6 pounds.  Some fluid retention at the end of the day.  Please call Neela to discuss what could be done or a change in meds.     Action Taken: Message routed to:  [facility] cardio    Travel Screening: Not Applicable    Thank you!  [facility]

## 2024-01-03 NOTE — TELEPHONE ENCOUNTER
Please have him increase Bumex to 4 mg in the morning and 2 mg in the afternoon for 2 days then continue 2 mg twice a day thereafter.  Thank you

## 2024-01-09 NOTE — PROGRESS NOTES
ANTICOAGULATION  MANAGEMENT-Home Monitor Managed by Exception    Buck Sinclair 78 year old male is on warfarin with therapeutic INR result. (Goal INR 2.0-3.0)    Recent labs: (last 7 days)     01/09/24  0000   INR 2.8       Previous INR was Therapeutic  Medication, diet, health changes since last INR:chart reviewed; none identified  Contacted within the last 12 weeks by phone on 10/24/23.  Patient due for 12 week follow up call at next INR check.  Last [facility] referral date: 09/12/2023      LUCAS Jane was NOT contacted regarding therapeutic result today per home monitoring policy manage by exception agreement.   Current warfarin dose is to be continued:     Summary  As of 1/9/2024      Full warfarin instructions:  2.5 mg every day   Next INR check:  1/16/2024             ?   Marina Velazco RN  Anticoagulation Clinic  1/9/2024    _______________________________________________________________________     Anticoagulation Episode Summary       Current INR goal:  2.0-3.0   TTR:  76.8% (11.6 mo)   Target end date:  Indefinite   Send INR reminders to:  [facility]    Indications    Persistent Atrial Fibrillation (Resolved) [I48.91]  Paroxysmal atrial fibrillation (H) [I48.0]  Persistent atrial fibrillation (H) [I48.19]             Comments:  Home monitor ( [employer] )managed by exception             Anticoagulation Care Providers       Provider Role Specialty Phone number    Erica Hansen APRN CNP Referring Cardiovascular Disease [phone]    Domo Garrett MD Referring Cardiovascular Disease [phone]    Everett Renee MD  Cardiovascular Disease [phone]

## 2024-01-09 NOTE — TELEPHONE ENCOUNTER
Response received from [facility] that prior auth is needed for Warfarin,     Britta Hurst RN

## 2024-01-16 NOTE — TELEPHONE ENCOUNTER
See 1/16/24 [facility] encounter. Pt's wife Neela reports that she has been having trouble getting the warfarin refilled. Neela reports that on 1/4/24, she only was given 24 tablets. The 1/4/24 prescription had two different dosing orders on it. Neela was told that the warfarin needed a prior auth. See 1/4/24 refill encounter.      Corrected warfarin prescription sent to pharmacy. Neela will call [facility] back if she still has more difficulty

## 2024-01-16 NOTE — PROGRESS NOTES
ANTICOAGULATION MANAGEMENT     Buck Sinclair 78 year old male is on warfarin with therapeutic INR result. (Goal INR 2.0-3.0)    Recent labs: (last 7 days)     01/16/24  0000   INR 2.5       ASSESSMENT     Source(s): Chart Review and Patient/Caregiver Call     Warfarin doses taken: Warfarin taken as instructed  Diet: No new diet changes identified  Medication/supplement changes: None noted  New illness, injury, or hospitalization: No  Signs or symptoms of bleeding or clotting: No  Previous result: Therapeutic last 2(+) visits  Additional findings: See 1/16/24 refill request. Neela was having trouble refilling the warfarin. Updated warfarin script sent. Neela reports that pt's warfarin is covered through work comp since pt had a heart attack while at work about 27 years ago.  Confirmed that pt takes 1 tablet (2.5 mg) daily of warfarin.  Neela will call [facility] if further assistance needed otherwise may resume MBE       PLAN     Dosing Instructions: Continue your current warfarin dose with next INR in 1 week       Summary  As of 1/16/2024      Full warfarin instructions:  2.5 mg every day   Next INR check:  1/23/2024               Telephone call with Neela (wife) who agrees to plan and repeated back plan correctly    Patient to recheck with home meter    Education provided:   Please call back if any changes to your diet, medications or how you've been taking warfarin  Contact [phone]  with any changes, questions or concerns.     Plan made per [facility] anticoagulation protocol    Deepa Godoy, RN  Anticoagulation Clinic  1/16/2024    _______________________________________________________________________     Anticoagulation Episode Summary       Current INR goal:  2.0-3.0   TTR:  76.8% (11.6 mo)   Target end date:  Indefinite   Send INR reminders to:  [facility]    Indications    Persistent Atrial Fibrillation (Resolved) [I48.91]  Paroxysmal atrial fibrillation (H) [I48.0]  Persistent atrial  fibrillation (H) [I48.19]             Comments:  Home monitor ( [employer] )managed by exception             Anticoagulation Care Providers       Provider Role Specialty Phone number    Erica Hansen APRN CNP Referring Cardiovascular Disease [phone]    Domo Garrett MD Referring Cardiovascular Disease [phone]    Everett Renee MD  Cardiovascular Disease [phone]

## 2024-01-16 NOTE — TELEPHONE ENCOUNTER
[facility] Call Center    Phone Message    May a detailed message be left on voicemail: yes     Reason for Call: Medication Refill Request    Has the patient contacted the pharmacy for the refill? Yes   Name of medication being requested: warfarin ANTICOAGULANT (COUMADIN) 2.5 MG tablet   Provider who prescribed the medication: Dr Collins  Pharmacy: [id number] [place], [place] - [address] AT [facility] [address]    Date medication is needed: 2/17/2024   The patient said to have the RX say to take 2.5 MG everyday so when the patient gets medication adjusted based on INR the pharmacy will refill and insurance will approve.    Action Taken: Other: Cardiology    Travel Screening: Not Applicable    Thank you!  [facility]

## 2024-01-23 NOTE — PROGRESS NOTES
ANTICOAGULATION  MANAGEMENT-Home Monitor Managed by Exception    Buck Sinclair 78 year old male is on warfarin with therapeutic INR result. (Goal INR 2.0-3.0)    Recent labs: (last 7 days)     01/23/24  0000   INR 2.5       Previous INR was Therapeutic  Medication, diet, health changes since last INR:chart reviewed; none identified  Contacted within the last 12 weeks by phone on 1/16/24  Last [facility] referral date: 09/12/2023      LUCAS Jane was NOT contacted regarding therapeutic result today per home monitoring policy manage by exception agreement.   Current warfarin dose is to be continued:     Summary  As of 1/23/2024      Full warfarin instructions:  2.5 mg every day   Next INR check:  1/30/2024             ?   Desire Cortez RN  Anticoagulation Clinic  1/23/2024    _______________________________________________________________________     Anticoagulation Episode Summary       Current INR goal:  2.0-3.0   TTR:  76.8% (11.6 mo)   Target end date:  Indefinite   Send INR reminders to:  [facility]    Indications    Persistent Atrial Fibrillation (Resolved) [I48.91]  Paroxysmal atrial fibrillation (H) [I48.0]  Persistent atrial fibrillation (H) [I48.19]             Comments:  Home monitor ( [employer] )managed by exception             Anticoagulation Care Providers       Provider Role Specialty Phone number    Erica Hansen APRN CNP Referring Cardiovascular Disease [phone]    Domo Garrett MD Referring Cardiovascular Disease [phone]    Everett Renee MD  Cardiovascular Disease [phone]

## 2024-01-30 NOTE — PROGRESS NOTES
ANTICOAGULATION  MANAGEMENT-Home Monitor Managed by Exception    Buck Sinclair 78 year old male is on warfarin with therapeutic INR result. (Goal INR 2.0-3.0)    Recent labs: (last 7 days)     01/30/24  0000   INR 3.0       Previous INR was Therapeutic  Medication, diet, health changes since last INR:chart reviewed; none identified  Contacted within the last 12 weeks by phone on 1/16/24  Last [facility] referral date: 09/12/2023      LUCAS Jane was NOT contacted regarding therapeutic result today per home monitoring policy manage by exception agreement.   Current warfarin dose is to be continued:     Summary  As of 1/30/2024      Full warfarin instructions:  2.5 mg every day   Next INR check:  2/6/2024             ?   Mirtha Maier RN  Anticoagulation Clinic  1/30/2024    _______________________________________________________________________     Anticoagulation Episode Summary       Current INR goal:  2.0-3.0   TTR:  76.8% (11.6 mo)   Target end date:  Indefinite   Send INR reminders to:  [facility]    Indications    Persistent Atrial Fibrillation (Resolved) [I48.91]  Paroxysmal atrial fibrillation (H) [I48.0]  Persistent atrial fibrillation (H) [I48.19]             Comments:  Home monitor ( [employer] )managed by exception             Anticoagulation Care Providers       Provider Role Specialty Phone number    Erica Hansen APRN CNP Referring Cardiovascular Disease [phone]    Domo Garrett MD Referring Cardiovascular Disease [phone]    Everett Renee MD  Cardiovascular Disease [phone]

## 2024-02-06 NOTE — PROGRESS NOTES
ANTICOAGULATION  MANAGEMENT-Home Monitor Managed by Exception    Buck Sinclair 78 year old male is on warfarin with therapeutic INR result. (Goal INR 2.0-3.0)    Recent labs: (last 7 days)     02/06/24  0000   INR 3.0       Previous INR was Therapeutic  Medication, diet, health changes since last INR:chart reviewed; none identified  Contacted within the last 12 weeks by phone on 1/16/24  Last [facility] referral date: 09/12/2023      LUCAS Jane was NOT contacted regarding therapeutic result today per home monitoring policy manage by exception agreement.   Current warfarin dose is to be continued:     Summary  As of 2/6/2024      Full warfarin instructions:  2.5 mg every day   Next INR check:  2/13/2024             ?   Leana Nayak, RN  Anticoagulation Clinic  2/6/2024    _______________________________________________________________________     Anticoagulation Episode Summary       Current INR goal:  2.0-3.0   TTR:  76.8% (11.6 mo)   Target end date:  Indefinite   Send INR reminders to:  [facility]    Indications    Persistent Atrial Fibrillation (Resolved) [I48.91]  Paroxysmal atrial fibrillation (H) [I48.0]  Persistent atrial fibrillation (H) [I48.19]             Comments:  Home monitor ( [employer] )managed by exception             Anticoagulation Care Providers       Provider Role Specialty Phone number    Erica Hansen APRN CNP Referring Cardiovascular Disease [phone]    Domo Garrett MD Referring Cardiovascular Disease [phone]    Everett Renee MD  Cardiovascular Disease [phone]

## 2024-02-13 NOTE — TELEPHONE ENCOUNTER
[facility] Call Center    Phone Message    May a detailed message be left on voicemail: yes     Reason for Call: Other: .     Patient states he is not getting enough O2 sometimes. Patient states he is wanting to get a call back from Dr. Payton or the nurse to know what to do. Please advise.     Action Taken: Message routed to:  [facility] Lung    Travel Screening: Not Applicable

## 2024-02-13 NOTE — TELEPHONE ENCOUNTER
Spoke with Buck in regards to message about not getting enough oxygen. He informs that he is back in  A-fib per his cardiologist. He is currently on 7 liters of oxygen and sats 90% while at rest. He reports having an incident this morning while he was out to breakfast wearing his pulse dose oxygen at 6 liters. He was walking back to his car and could not catch his breath. Oxygen level dropped to 35%. He sat awhile and rested. His oxygen came back up to 90%. He reports some increase in cough, shortness of breath, phlegm that is brown in color but can turn pink tinged if he coughs too much. Denies fever. Consulted with Flakita Slaughter CNP. She ordered prednisone 40 mg x 5 days and doxycycline 100 mg BID x 5 days. Advised Buck to go to the ER or call 911 if his oxygen does not stay above 90%. He is also asking about getting continuous oxygen tanks from [facility]. Informed him if he gets continuous oxygen , they may take away his pulse dose. Will consult with Dr. Payton tomorrow and call [facility].

## 2024-02-13 NOTE — PROGRESS NOTES
ANTICOAGULATION  MANAGEMENT-Home Monitor Managed by Exception    Buck Sinclair 78 year old male is on warfarin with therapeutic INR result. (Goal INR 2.0-3.0)    Recent labs: (last 7 days)     02/13/24  0000   INR 3.0       Previous INR was Therapeutic  Medication, diet, health changes since last INR:chart reviewed; none identified  Contacted within the last 12 weeks by phone on 1/16/24  Last [facility] referral date: 09/12/2023      LUCAS Jane was NOT contacted regarding therapeutic result today per home monitoring policy manage by exception agreement.   Current warfarin dose is to be continued:     Summary  As of 2/13/2024      Full warfarin instructions:  2.5 mg every day   Next INR check:  2/20/2024             ?   Gisele Prince RN  Anticoagulation Clinic  2/13/2024    _______________________________________________________________________     Anticoagulation Episode Summary       Current INR goal:  2.0-3.0   TTR:  76.8% (11.6 mo)   Target end date:  Indefinite   Send INR reminders to:  [facility]    Indications    Persistent Atrial Fibrillation (Resolved) [I48.91]  Paroxysmal atrial fibrillation (H) [I48.0]  Persistent atrial fibrillation (H) [I48.19]             Comments:  Home monitor ( [employer] )managed by exception             Anticoagulation Care Providers       Provider Role Specialty Phone number    Erica Hansen APRN CNP Referring Cardiovascular Disease [phone]    Domo Garrett MD Referring Cardiovascular Disease [phone]    Everett Renee MD  Cardiovascular Disease [phone]

## 2024-02-14 NOTE — TELEPHONE ENCOUNTER
Called and spoke with Buck today to check on how he is doing after starting his action plan. His oxygen level is staying stable at 89-91 % on 6-7 liters of oxygen. He will finish out his action plan and agrees to go to ER or call 911 if symptoms get worse over the weekend. He did move his appt up to 3/1/24 with Dr. Payton.

## 2024-02-20 NOTE — PROGRESS NOTES
ANTICOAGULATION MANAGEMENT     Buck Sinclair 79 year old male is on warfarin with supratherapeutic INR result. (Goal INR 2.0-3.0)    Recent labs: (last 7 days)     02/20/24  0000   INR 4.7*       ASSESSMENT     Source(s): Chart Review and Home Care/Facility Nurse     Warfarin doses taken: Warfarin taken as instructed  Diet: No new diet changes identified. Wife feels that patient got his normal amount of greens despite being ill. Patient reports he did have two alcoholic beverages over the weekend while family was in town which is not his baseline.  Medication/supplement changes: Doxycycline and prednisone 2/13/24-2/18/24  New illness, injury, or hospitalization: Yes: recently treated for a COPD flare up. Patient reports symptoms are improving.  Signs or symptoms of bleeding or clotting: Yes: patient had some blood in a tissue after blowing his nose today. Writer recommended using a humidifier and nasal saline spray. Patient will continue to monitor and report any concerns.  Previous result: Therapeutic last 2(+) visits  Additional findings: None       PLAN     Recommended plan for temporary change(s) affecting INR     Dosing Instructions: hold dose then continue your current warfarin dose with next INR in 3 days       Summary  As of 2/20/2024      Full warfarin instructions:  2/20: Hold; Otherwise 2.5 mg every day   Next INR check:  2/23/2024               Telephone call with Neela who agrees to plan and repeated back plan correctly    Patient to recheck with home meter    Education provided:   Please call back if any changes to your diet, medications or how you've been taking warfarin  Symptom monitoring: monitoring for bleeding signs and symptoms, monitoring for clotting signs and symptoms, and monitoring for stroke signs and symptoms  Contact [phone]  with any changes, questions or concerns.     Plan made per [facility] anticoagulation protocol    Gisele Saul RN  Anticoagulation  Clinic  2/20/2024    _______________________________________________________________________     Anticoagulation Episode Summary       Current INR goal:  2.0-3.0   TTR:  76.5% (11.6 mo)   Target end date:  Indefinite   Send INR reminders to:  [facility]    Indications    Persistent Atrial Fibrillation (Resolved) [I48.91]  Paroxysmal atrial fibrillation (H) [I48.0]  Persistent atrial fibrillation (H) [I48.19]             Comments:  Home monitor ( [employer] )managed by exception             Anticoagulation Care Providers       Provider Role Specialty Phone number    Erica Hansen APRN CNP Referring Cardiovascular Disease [phone]    Domo Garrett MD Referring Cardiovascular Disease [phone]    Everett Renee MD  Cardiovascular Disease [phone]

## 2024-02-23 NOTE — PROGRESS NOTES
ANTICOAGULATION MANAGEMENT     Buck Sinclair 79 year old male is on warfarin with therapeutic INR result. (Goal INR 2.0-3.0)    Recent labs: (last 7 days)     02/23/24  0000   INR 2.9       ASSESSMENT     Source(s): Chart Review and Patient/Caregiver Call     Warfarin doses taken: Held for 4.7  recently which may be affecting INR  Diet: No new diet changes identified  Medication/supplement changes:  Doxycycline and prednisone 2/13/24-2/18/24  New illness, injury, or hospitalization: No, Symptoms of COPD flare up have resolved  Signs or symptoms of bleeding or clotting: No  Previous result: Supratherapeutic  Additional findings: None       PLAN     Recommended plan for no diet, medication or health factor changes affecting INR     Dosing Instructions: Continue your current warfarin dose with next INR in 4 days       Summary  As of 2/23/2024      Full warfarin instructions:  2.5 mg every day   Next INR check:  2/27/2024               Telephone call with Neela, spouse, who verbalizes understanding and agrees to plan    Patient to recheck with home meter    Education provided:   Please call back if any changes to your diet, medications or how you've been taking warfarin    Plan made per [facility] anticoagulation protocol    Desire Cortez, RN  Anticoagulation Clinic  2/23/2024    _______________________________________________________________________     Anticoagulation Episode Summary       Current INR goal:  2.0-3.0   TTR:  76.6% (11.6 mo)   Target end date:  Indefinite   Send INR reminders to:  [facility]    Indications    Persistent Atrial Fibrillation (Resolved) [I48.91]  Paroxysmal atrial fibrillation (H) [I48.0]  Persistent atrial fibrillation (H) [I48.19]             Comments:  Home monitor ( [employer] )managed by exception             Anticoagulation Care Providers       Provider Role Specialty Phone number    Erica Hansen APRN CNP Referring Cardiovascular Disease [phone]    Domo Garrett,  MD Referring Cardiovascular Disease [phone]    Everett Renee MD  Cardiovascular Disease [phone]

## 2024-02-23 NOTE — TELEPHONE ENCOUNTER
Reason for Call:  INR follow up    Detailed comments: Patient is calling Desire back, please call again. Thank you    Phone Number Patient can be reached at: Home number on file [phone] (home)    Best Time: today    Can we leave a detailed message on this number? YES    Call taken on 2/23/2024 at 9:56 AM by Josy Dillard

## 2024-02-27 NOTE — PROGRESS NOTES
ANTICOAGULATION  MANAGEMENT-Home Monitor Managed by Exception    Buck Sinclair 79 year old male is on warfarin with therapeutic INR result. (Goal INR 2.0-3.0)    Recent labs: (last 7 days)     02/27/24  0000   INR 2.7       Previous INR was Therapeutic  Medication, diet, health changes since last INR:chart reviewed; none identified  Contacted within the last 12 weeks by phone on 2/23/24  Last [facility] referral date: 09/12/2023      LUCAS Jane was NOT contacted regarding therapeutic result today per home monitoring policy manage by exception agreement.   Current warfarin dose is to be continued:     Summary  As of 2/27/2024      Full warfarin instructions:  2.5 mg every day   Next INR check:  3/5/2024             ?   Leana Nayak, RN  Anticoagulation Clinic  2/27/2024    _______________________________________________________________________     Anticoagulation Episode Summary       Current INR goal:  2.0-3.0   TTR:  77.5% (11.6 mo)   Target end date:  Indefinite   Send INR reminders to:  [facility]    Indications    Persistent Atrial Fibrillation (Resolved) [I48.91]  Paroxysmal atrial fibrillation (H) [I48.0]  Persistent atrial fibrillation (H) [I48.19]             Comments:  Home monitor ( [employer] )managed by exception             Anticoagulation Care Providers       Provider Role Specialty Phone number    Erica Hansen APRN CNP Referring Cardiovascular Disease [phone]    Domo Garrett MD Referring Cardiovascular Disease [phone]    Everett Renee MD  Cardiovascular Disease [phone]

## 2024-03-01 NOTE — PATIENT INSTRUCTIONS
Increase bumex to 4mg twice a day  Take the metolazone 5mg 30min before the bumex  Follow up with Dr. Garrett and heart failure ASAP  Get blood work done early next week.

## 2024-03-01 NOTE — TELEPHONE ENCOUNTER
Patient's wife Neela is contacted to advise that [facility] is able to provide IV Diuretic Pushes in clinic , but that orders from his Cardiology provider would be required. They will leave message for Dr. Garrett, as he is currently out of the clinic today. Patient will be contacted to arrange this once orders are secured. Advised patient to seek ED evaluation /possible admission if he is not improving with Metolazone use over the weekend. (ordered by Pulmonary provider)   Puja SALDAÑA RN BSN, CHFN, PCCN-K

## 2024-03-01 NOTE — TELEPHONE ENCOUNTER
[facility] Call Center    Phone Message    May a detailed message be left on voicemail: yes     Reason for Call: Other: Pt called wanting to know if Lasix could be done at the clinic. Please call pt spouse back for further discussion.     Action Taken: Other: cardiology    Travel Screening: Not Applicable    Thank you!  [facility]

## 2024-03-01 NOTE — PROGRESS NOTES
Pulmonary Clinic Follow-up Visit    Assessment/Plan:  79M with a history of tobacco dependence in remission, CAD s/p PCI/stents, afib s/p PVL with ICD/PPM on anticoagulation, history of cavitary lesion and extensive pneumonia in LLL in Oct 2017, subsequent recurrent LLL pneumonia, since resolved, presenting for follow up. Repeat PFT's in 2020 actually showed improved FEV1/FVC ratio, stable FEV1 but substantial worsening of the DLco (almost 40% lower than in 2018). Last PFTs in 10/2022 showed a stable FEV1 but DLco is again decreased at 34% when not corrected for hemoglobin.   Recent hospitalization for volume overload, acute hypoxic respiratory failure, and COPD exacerbation.  Diuretics managed by Dr. Garrett and CHF clinicly, recent changed from furosemide to Bumex.      RHC data showed mPAP of 37 mm Hg, normal PCWP, CO 5.7 L/min, TPG of 21 and PVR of 4.7 BLAIR.    Today, he is coming in with increase peripheral edema, weight gain and SOB. He is more hypoxemic, requiring 7-8Lpm to maintain SpO2 in the low 90s. He has significant SOB with exertion. Clearly in decompensated CHF today. I strongly urged him to go to the ER for admission and IV diuresis, like last year. He does not want to go in. I cautioned that it will be very difficult to manage his CHF as an outpatient. Will try increasing diuretics and adding metolazone. Advised him to call his cardiology ASAP for further instructions. No benefit from prednisone recently so I do not think his lung disease is contributing. Worsening afib may be contributing to decompensation.      Plan:  #COPD, GOLD class E (CAT >10, recent hospitalization). Minimal smoking history so this is probably due to his work as a . PFTs 2017 showed mild obstruction and normal DLco. Mild exertional hypoxemia noted on 6MWT in 2019. Now on supplemental oxygen as of 2020.  - continue budesonide-formoterol 160-4.5 mcg two inhalations BID with spacer; rinse/gargle/spit water after use.  -  continue tiotropium HandiHaler one inhalation daily  - Cont albuterol as needed  - encouraged exercise, weight loss as able  - continue continuous O2 for goal O2 sat 88-92%   - declined pulmonary rehab previously; did not address today.   - no indication for LDCT as his smoking history is too minimal. Recent CT did not show suspicious lung findings.    #exertional hypoxemia, worsening DLco in 2021, significant LIMA: likely due to pulm HTN, HFpEF, afib. RHC Jan 2022: mPAP of 37 mm Hg, PCWP 14, CO 5.7 L/min, TPG of 23, DPG 10, and PVR of 4.1 BLAIR. as above c/w group 1 PH; DPG >7 suggests combined pre- and post-capillary PH with contribution from left-heard disease as well; overall WHO group I, II and III PH likely. Echo 05/2023 shows severe PH, normal EF, only lateral E/e' reported (it was normal); diastolic function thus indeterminate from that echo; but very likely he had incr LAP and diastolic dysfunction. E/A was >5 on that echo; markedly abnormal (however he was very volume overloaded and in decompensated CHF at that point). Significant worsening of DLco over the last 2 years. He is oxygen dependent. Clearly in CHF now.   - increase bumetanide to 4mg PO BID  - add 5mg PO metolazone daily 30 min before AM bumetanide dose  - daily weights.   - limit salt and fluid intake  - declines hospital admission  - check BMP, renal function early next week.   - call Dr. Garrett and CHF team ASAP for further instructions. I sent a message to his cardiology team to update them.   - defer afib management to his EP team  - strict ER precautions reinforced if he has worsening dyspnea or hypoxemia.      #nocturnal hypoxemia:  - wearing oxygen at night.   - previous sleep study in 2015 showed AHI of 1.4.  - patient previously declined sleep medicine referral; would not accept CPAP.     #RHM:  - UTD with vaccinations.      Follow up in 1-2 months.     Hugh Payton MD (Avi)  [facility]  [facility] [phone]  Fax [phone]       CCx: follow up of COPD GOLD class D, chronic hypoxemic respiratory failure requiring supplemental oxygen, CHF with volume overload.    HPI:  Interim history: I last saw Buck on 9/28/2023. Since that time, he had been experiencing increasing SOB and low O2 levels. He was needing to raise his O2 up to 7Lpm. He also got prednisone as part of his action plan.  Increased peripheral edema. Possible increase in his weight.  Apparently back in afib.  Today, he reports his breathing is very poor.     ROS:  A 10-system review was obtained and was negative with the exception of the symptoms endorsed in the history of present illness.    PMH:  Past Medical History:   Diagnosis Date    Anemia     Asthma without status asthmaticus 05/05/2021    Atrial fibrillation (H)     BPH (benign prostatic hyperplasia)     Cardiomyopathy (H)     CKD (chronic kidney disease) stage 3, GFR 30-59 ml/min (H) 05/24/2023    Congestive heart failure (H)     COPD, group B, by GOLD 2017 classification (H)     Coronary artery disease due to calcified coronary lesion     Dyslipidemia, goal LDL below 70     Essential hypertension     Heart failure with reduced ejection fraction (H) 08/17/2023    Hemoptysis 10/04/2017    History of transfusion     Hyperlipidemia     Persistent atrial fibrillation (H)     Pneumonia of left lower lobe due to infectious organism 10/04/2017    Pulmonary hypertension (H)     Skin cancer of trunk     Status post catheter ablation of atrial fibrillation 06/07/2017    PVI 4-2011 (Cryo/PVI + roof line + CTI line) Re-do PVI 7-2011 (RFA/PVI + CFE + VIDYA + confirmed CTI line)    Ventricular tachycardia (H)        PSH:  Past Surgical History:   Procedure Laterality Date    CARDIAC DEFIBRILLATOR PLACEMENT      CARDIOVERSION  07/11/2018    x20, last 2/12/15, 10/2015, 11/18/16, 6/16/17 by Lauren Foster CNP    CARDIOVERSION  07/11/2018    CARDIOVERSION  11/19/2021    COLONOSCOPY N/A  04/28/2017    Procedure: COLONOSCOPY with 2 ascending polyps and 1 transverse polyp;  Surgeon: Jose Whittington MD;  Location: [facility] GI;  Service:     CORONARY ANGIOGRAPHY ADULT ORDER      CV CORONARY ANGIOGRAM N/A 09/20/2017    Procedure: Coronary Angiogram;  Surgeon: Sergio Cervantes MD;  Location: [facility] Cath Lab;  Service:     CV CORONARY ANGIOGRAM N/A 01/24/2022    Procedure: Coronary Angiogram;  Surgeon: Christi Saunders MD;  Location: [facility] LAB CV    CV LEFT HEART CATH N/A 01/24/2022    Procedure: Left Heart Cath;  Surgeon: Christi Saunders MD;  Location: [facility] LAB CV    CV RIGHT AND LEFT HEART CATH N/A 01/24/2022    Procedure: Right and Left Heart Catherization;  Surgeon: Christi Saunders MD;  Location: [facility] CV    EP ICD GENERATOR REPLACEMENT DUAL N/A 10/28/2022    Procedure: Implantable Cardioverter Defibrillator Generator Replacement Dual;  Surgeon: Elo Collins MD;  Location: [facility] CV    EP ICD INSERT      FRACTURE SURGERY Left     wrist    HEART CATH, ANGIOPLASTY      IMPLANT AUTOMATIC IMPLANTABLE CARDIOVERTER DEFIBRILLATOR      INGUINAL HERNIA REPAIR Left 1967    while in the [employer] in [employer] after 13 month in [place]    INSERT / REPLACE / REMOVE PACEMAKER      IR MISCELLANEOUS PROCEDURE  04/30/2014    OTHER SURGICAL HISTORY      left hand surgery---tendon repair    MO ABLATE HEART DYSRHYTHM FOCUS  04/2011    Catheter Ablation Atrial Fibrillation PVI Apr 2011 (Cryo+RF-PVI + roof line + CTI line)    MO ABLATE HEART DYSRHYTHM FOCUS  07/2011    Re-do PVI Jul 2011 (RFA-PVI + CFE + VIDYA + confirmation of CTI line)    TOTAL SHOULDER REPLACEMENT Right 03/03/2016    Dr. Abernathy of [facility]    WRIST SURGERY Left     ZZC MYERS W/O FACETEC FORAMOT/DSKC 1/2 VRT SEG, CERVICAL      Laminectomy Lumbar;  Recorded: 03/09/2012;       Allergies:     Allergies   Allergen Reactions    Adhesive Tape Other (See Comments)     ADHESIVE TAPE; SKIN IRRITATION;  Skin pulled off with foam tape      Amiodarone      ADVERSE REACTION.  Sunlight sensitivity.    Lisinopril          Family HX:  Family History   Problem Relation Age of Onset    Cancer Mother         leukemia    Cancer Father         bladder    Cancer Sister         breast with lung met.    Aneurysm Sister     CABG Brother     CABG Brother     Valvular heart disease Brother         valve replacement       Social Hx:  Social History     Socioeconomic History    Marital status:      Spouse name: Neela    Number of children: Not on file    Years of education: 12    Highest education level: Not on file   Occupational History    Occupation:      Employer: RETIRED    Occupation:  police     Comment: [place]   Social Needs    Financial resource strain: Not on file    Food insecurity     Worry: Not on file     Inability: Not on file    Transportation needs     Medical: Not on file     Non-medical: Not on file   Tobacco Use    Smoking status: Former Smoker     Packs/day: 1.00     Years: 4.00     Pack years: 4.00     Types: Cigarettes     Quit date: 1968     Years since quittin.3    Smokeless tobacco: Never Used   Substance and Sexual Activity    Alcohol use: Yes     Alcohol/week: 2.0 standard drinks     Types: 2 Cans of beer per week     Comment: 1 beer per week    Drug use: No    Sexual activity: Yes     Partners: Female     Birth control/protection: Post-menopausal   Lifestyle    Physical activity     Days per week: Not on file     Minutes per session: Not on file    Stress: Not on file   Relationships    Social connections     Talks on phone: Not on file     Gets together: Not on file     Attends Taoism service: Not on file     Active member of club or organization: Not on file     Attends meetings of clubs or organizations: Not on file     Relationship status: Not on file    Intimate partner violence     Fear of current or ex partner: Not on file     Emotionally abused: Not on file      Physically abused: Not on file     Forced sexual activity: Not on file   Other Topics Concern    Not on file   Social History Narrative    Not on file       Current Meds:  Current Outpatient Medications   Medication    acetaminophen (TYLENOL) 325 MG tablet    albuterol (PROAIR HFA/PROVENTIL HFA/VENTOLIN HFA) 108 (90 Base) MCG/ACT inhaler    Ascorbic Acid (VITAMIN C) 500 MG CAPS    aspirin (ASA) 81 MG EC tablet    atorvastatin (LIPITOR) 40 MG tablet    budesonide-formoterol (SYMBICORT) 160-4.5 MCG/ACT Inhaler    bumetanide (BUMEX) 2 MG tablet    co-enzyme Q-10 100 MG CAPS capsule    empagliflozin (JARDIANCE) 10 MG TABS tablet    fish oil-omega-3 fatty acids 1000 MG capsule    FLUoxetine (PROZAC) 20 MG capsule    MAG64 64 MG TBEC CR tablet    metolazone (ZAROXOLYN) 5 MG tablet    metoprolol succinate ER (TOPROL XL) 25 MG 24 hr tablet    multivitamin, therapeutic (THERA-VIT) TABS tablet    nitroGLYcerin (NITROSTAT) 0.4 MG sublingual tablet    OXYGEN-HELIUM IN    polyethylene glycol (MIRALAX) 17 GM/Dose powder    sodium chloride (OCEAN) 0.65 % nasal spray    tiotropium (SPIRIVA) 18 MCG inhaled capsule    vitamin D2 (ERGOCALCIFEROL) 19494 units (1250 mcg) capsule    warfarin ANTICOAGULANT (COUMADIN) 2.5 MG tablet     No current facility-administered medications for this visit.       Physical Exam:  BP (!) 141/76 (BP Location: Right arm, Patient Position: Sitting, Cuff Size: Adult Regular)   Pulse 85   Wt 111.5 kg (245 lb 12.8 oz)   SpO2 91%   BMI 30.72 kg/m  Gen: alert, oriented, no distress  Gen: alert, oriented, no distress  HEENT: nasal turbinates are unremarkable, no oropharyngeal lesions, no cervical or supraclavicular lymphadenopathy  CV: irreg irreg, no M/G/R  Resp: slight bibasilar crackles. Good air movement. No wheezing.   Abd: soft, nontender, no palpable organomegaly  Skin: no apparent rashes  Ext: 2-3+ pitting edema bilaterally. Significantly worse than last visit.  Neuro: alert,  nonfocal    Labs:  Reviewed  Dec 2018  Chem panel wnl  TSH wnl  hgb 12.1 Oct 2018    Previous testing  DONNA neg  blasto neg  Histo testing neg  c-ANCA neg  HIV neg  RF neg    Bronch/LLL BAL cultures neg    Imaging studies:  CT CHEST W/O CONTRAST, DATE/TIME: 5/19/2023 10:38 AM CDT                                                              IMPRESSION:   1.  Unchanged bibasilar peripheral parenchymal scarring. No acute lung, airway, or pleural space abnormality.  2.  Biatrial and right ventricular heart enlargement. Dilated central pulmonary arteries. Findings are consistent with acute pulmonary hypertension. Calcification in the walls of the left atrium likely from prior ablation procedure.    Echo Dec 2021  Interpretation Summary     1. Left ventricular systolic function is normal. The left ventricle is normal  in size. There is normal left ventricular wall thickness.Left ventricular  diastolic function is normal. No regional wall motion abnormalities noted.  2. The right ventricle is mildly dilated. Mildly decreased right ventricular  systolic functio.  3. There is mild to moderate (1-2+) tricuspid regurgitation.  4. The right ventricular systolic pressure is approximated at 45.7mmHg plus  the right atrial pressure.Right ventricular systolic pressure is elevated,  consistent with moderate pulmonary hypertension. Normal RA pressure of 3 mmHg.  6. The ascending aorta is mildly dilated at 42 mm.    RHC 1/24/2022  Exertional dyspnea in a patient with known CAD and severe COPD.  Mild coronary artery disease with patent LAD stents.  LVEDP and PCWP are normal. The PA pressure was elevated at 66/24 mmHg with a mean pressure of 37 mmHg. See numbers below.  Shikha and TD cardiac outputs were both 5.7 l/min.  Sats on 2L NC oxygen: Ao 93%, PA 60%, RA 59%        PFTs 2018  FEV1/FVC is 0.56 and is reduced.  FEV1 is 73% predicted and is reduced.  FVC is 96% predicted and normal.  There was no improvement in spirometry after a  single inhaled dose of bronchodilator.  TLC is 99% predicted and is normal.  RV is 113% predicted and is normal.  DLCO is 93% predicted and is normal when it   is corrected for hemoglobin.  Impression:  Full Pulmonary Function Test is abnormal.  PFTs are consistent with mild obstructive disease.  Spirometry is not consistent with reversibility.  There is no hyperinflation.  There is no air-trapping.  Diffusion capacity when corrected for hemoglobin is normal.  Declines in both FEV1 and TLC since 2009 with overall preservation of diffusing capacity.    PFTs 11/10/2021  FEV1 2.42L 68%  FVC 76%  No BD response  Ratio 0.66 (LLN 0.6)  DLco 46% han for hgb  Flow vol curve suggests obstruction.  Impression: no obstruction based on ratio >LLN but FV curve does suggest some obstruction. Significant decline in DLco compared to DLco.     PFTs 10/27/2022  FEV1 1.98L 57%  FVC 3.23L 68%  FEV1/FVC 61%  DLCO not corrected 34%  The FVC and FEV1 are reduced, but the FEV1/FVC ratio is within normal limits.  The FEV1/FEV6 ratio is reduced.  The inspiratory flow rates are within normal limits.  The TLC is reduced.  The diffusing capacity is reduced.  However, the diffusing capacity was not corrected for the patient's hemoglobin.   IMPRESSION:   Mixed Moderately severe airflow obstruction and restriction.   Severe diffusion defect.  The diffusing capacity was not corrected for the patient's hemoglobin.     July 2019  SIX MINUTE WALK TEST / HOME O2 EVALUATION  SpO2 at rest on RA 96%  SpO2 started to drop after 2 1/2 minutes walking. SpO2 after walking 2 1/2 minutes on RA was 88%  SpO2 after walking 6 minutes on RA was 87%  Distance covered 320.04 meters.  Recovery phase, SpO2 after 1 minute rest on RA was 87%  Recovery phase, SpO2 after 2 minute rest on RA was 97%  Impression:   Significant desaturation with activities.   Patient does qualify for O2 supplementation with activity.    Sleep Study 5/10/2015  [place] Sleepiness Score =   17  Mallampati Class =   4  Neck Circumference =   17.75 inches  BMI =      32.9  STOP-BANG Scale =   6/8  Primary Findings:  1- Respiratory monitoring showed intermittent snoring but overall no significant obstructive sleep apnea/hypopnea sufficient to disturb sleep (AHI=1.4).  2- Supine sleep was not sampled during the study but the patient does not usually sleep in this position due to shoulder problems.   Other Findings:  Sleep Architecture:   - Sleep onset latency was normal.  - REM onset latency was prolonged.  - All stages of sleep were sampled during the test.  Respiration:  - Mean hemoglobin oxygen saturation (SaO2) during the diagnostic portion of the PSG in NREM and REM sleep was 94% with a SaO2 desaturation eri observed to 89%.  - Sleep-related Hypoxemia was not present.  Movements:      - The patient had a Periodic Limb Movement (PLM) index of 146.8 and the MYRNA PLM index was 6.4  Parasomnia:  - No activity consistent with parasomnia was observed in the video recording.  Electrocardiogram:  - Two-lead ECG showed atrial fibrillation with PVCs.  Electroencephalogram:  - We used a limited-montage EEG with F3, F4, C3, C4, O1, O2 and contra-lateral references to M1 and M2 that was reviewed using a 30-second EPOCH. No EEG abnormalities were observed with these settings.  Diagnosis:  -   780.54 Hypersomnia unspecified.  Assessment & Recommendation:   The results from this study are not sufficient to explain the patient s hypersomnia. Measures aimed at reducing upper airway resistance are recommended for the degree of sleep-disordered breathing that was observed in this study. Options include: positional therapy to avoid sleep in the supine posture, ENT evaluation for surgery, and dental evaluation for an oral appliance. Patients with excessive daytime sleepiness are at increased risk for motor vehicle accidents.  Shivam Saul MD  Staff Physician- [facility] Sleep Medicine Services

## 2024-03-01 NOTE — LETTER
3/1/2024         RE: Buck Sinclair  [address] [place] [address] Dr [address] [address]        Dear Colleague,    Thank you for referring your patient, Buck Sinclair, to the [facility]. Please see a copy of my visit note below.    Pulmonary Clinic Follow-up Visit    Assessment/Plan:  79M with a history of tobacco dependence in remission, CAD s/p PCI/stents, afib s/p PVL with ICD/PPM on anticoagulation, history of cavitary lesion and extensive pneumonia in LLL in Oct 2017, subsequent recurrent LLL pneumonia, since resolved, presenting for follow up. Repeat PFT's in 2020 actually showed improved FEV1/FVC ratio, stable FEV1 but substantial worsening of the DLco (almost 40% lower than in 2018). Last PFTs in 10/2022 showed a stable FEV1 but DLco is again decreased at 34% when not corrected for hemoglobin.   Recent hospitalization for volume overload, acute hypoxic respiratory failure, and COPD exacerbation.  Diuretics managed by Dr. Garrett and CHF clinicly, recent changed from furosemide to Bumex.      RHC data showed mPAP of 37 mm Hg, normal PCWP, CO 5.7 L/min, TPG of 21 and PVR of 4.7 BLAIR.    Today, he is coming in with increase peripheral edema, weight gain and SOB. He is more hypoxemic, requiring 7-8Lpm to maintain SpO2 in the low 90s. He has significant SOB with exertion. Clearly in decompensated CHF today. I strongly urged him to go to the ER for admission and IV diuresis, like last year. He does not want to go in. I cautioned that it will be very difficult to manage his CHF as an outpatient. Will try increasing diuretics and adding metolazone. Advised him to call his cardiology ASAP for further instructions. No benefit from prednisone recently so I do not think his lung disease is contributing. Worsening afib may be contributing to decompensation.      Plan:  #COPD, GOLD class E (CAT >10, recent hospitalization). Minimal smoking history so this is probably due to his work as a  . PFTs 2017 showed mild obstruction and normal DLco. Mild exertional hypoxemia noted on 6MWT in 2019. Now on supplemental oxygen as of 2020.  - continue budesonide-formoterol 160-4.5 mcg two inhalations BID with spacer; rinse/gargle/spit water after use.  - continue tiotropium HandiHaler one inhalation daily  - Cont albuterol as needed  - encouraged exercise, weight loss as able  - continue continuous O2 for goal O2 sat 88-92%   - declined pulmonary rehab previously; did not address today.   - no indication for LDCT as his smoking history is too minimal. Recent CT did not show suspicious lung findings.    #exertional hypoxemia, worsening DLco in 2021, significant LIMA: likely due to pulm HTN, HFpEF, afib. RHC Jan 2022: mPAP of 37 mm Hg, PCWP 14, CO 5.7 L/min, TPG of 23, DPG 10, and PVR of 4.1 BLAIR. as above c/w group 1 PH; DPG >7 suggests combined pre- and post-capillary PH with contribution from left-heard disease as well; overall WHO group I, II and III PH likely. Echo 05/2023 shows severe PH, normal EF, only lateral E/e' reported (it was normal); diastolic function thus indeterminate from that echo; but very likely he had incr LAP and diastolic dysfunction. E/A was >5 on that echo; markedly abnormal (however he was very volume overloaded and in decompensated CHF at that point). Significant worsening of DLco over the last 2 years. He is oxygen dependent. Clearly in CHF now.   - increase bumetanide to 4mg PO BID  - add 5mg PO metolazone daily 30 min before AM bumetanide dose  - daily weights.   - limit salt and fluid intake  - declines hospital admission  - check BMP, renal function early next week.   - call Dr. Garrett and CHF team ASAP for further instructions. I sent a message to his cardiology team to update them.   - defer afib management to his EP team  - strict ER precautions reinforced if he has worsening dyspnea or hypoxemia.      #nocturnal hypoxemia:  - wearing oxygen at night.   - previous sleep  study in 2015 showed AHI of 1.4.  - patient previously declined sleep medicine referral; would not accept CPAP.     #RHM:  - UTD with vaccinations.      Follow up in 1-2 months.     Hugh Payton MD (Avi)  [facility])  Clinic [phone]  Fax [phone]       CCx: follow up of COPD GOLD class D, chronic hypoxemic respiratory failure requiring supplemental oxygen, CHF with volume overload.    HPI:  Interim history: I last saw Buck on 9/28/2023. Since that time, he had been experiencing increasing SOB and low O2 levels. He was needing to raise his O2 up to 7Lpm. He also got prednisone as part of his action plan.  Increased peripheral edema. Possible increase in his weight.  Apparently back in afib.  Today, he reports his breathing is very poor.     ROS:  A 10-system review was obtained and was negative with the exception of the symptoms endorsed in the history of present illness.    PMH:  Past Medical History:   Diagnosis Date     Anemia      Asthma without status asthmaticus 05/05/2021     Atrial fibrillation (H)      BPH (benign prostatic hyperplasia)      Cardiomyopathy (H)      CKD (chronic kidney disease) stage 3, GFR 30-59 ml/min (H) 05/24/2023     Congestive heart failure (H)      COPD, group B, by GOLD 2017 classification (H)      Coronary artery disease due to calcified coronary lesion      Dyslipidemia, goal LDL below 70      Essential hypertension      Heart failure with reduced ejection fraction (H) 08/17/2023     Hemoptysis 10/04/2017     History of transfusion      Hyperlipidemia      Persistent atrial fibrillation (H)      Pneumonia of left lower lobe due to infectious organism 10/04/2017     Pulmonary hypertension (H)      Skin cancer of trunk      Status post catheter ablation of atrial fibrillation 06/07/2017    PVI 4-2011 (Cryo/PVI + roof line + CTI line) Re-do PVI 7-2011 (RFA/PVI + CFE + VIDYA + confirmed CTI line)     Ventricular tachycardia  (H)        PSH:  Past Surgical History:   Procedure Laterality Date     CARDIAC DEFIBRILLATOR PLACEMENT       CARDIOVERSION  07/11/2018    x20, last 2/12/15, 10/2015, 11/18/16, 6/16/17 by Lauren Foster CNP     CARDIOVERSION  07/11/2018     CARDIOVERSION  11/19/2021     COLONOSCOPY N/A 04/28/2017    Procedure: COLONOSCOPY with 2 ascending polyps and 1 transverse polyp;  Surgeon: Jose Whittington MD;  Location: [facility];  Service:      CORONARY ANGIOGRAPHY ADULT ORDER       CV CORONARY ANGIOGRAM N/A 09/20/2017    Procedure: Coronary Angiogram;  Surgeon: Sergio Cervantes MD;  Location: [facility] Cath Lab;  Service:      CV CORONARY ANGIOGRAM N/A 01/24/2022    Procedure: Coronary Angiogram;  Surgeon: Christi Saunders MD;  Location: [facility] CATH LAB CV     CV LEFT HEART CATH N/A 01/24/2022    Procedure: Left Heart Cath;  Surgeon: Christi Saunders MD;  Location: [facility] LAB CV     CV RIGHT AND LEFT HEART CATH N/A 01/24/2022    Procedure: Right and Left Heart Catherization;  Surgeon: Christi Saunders MD;  Location: [facility] LAB CV     EP ICD GENERATOR REPLACEMENT DUAL N/A 10/28/2022    Procedure: Implantable Cardioverter Defibrillator Generator Replacement Dual;  Surgeon: Elo Collins MD;  Location: [facility] LAB CV     EP ICD INSERT       FRACTURE SURGERY Left     wrist     HEART CATH, ANGIOPLASTY       IMPLANT AUTOMATIC IMPLANTABLE CARDIOVERTER DEFIBRILLATOR       INGUINAL HERNIA REPAIR Left 1967    while in the [employer] in [place] after 13 month in [place]     INSERT / REPLACE / REMOVE PACEMAKER       IR MISCELLANEOUS PROCEDURE  04/30/2014     OTHER SURGICAL HISTORY      left hand surgery---tendon repair     HI ABLATE HEART DYSRHYTHM FOCUS  04/2011    Catheter Ablation Atrial Fibrillation PVI Apr 2011 (Cryo+RF-PVI + roof line + CTI line)     HI ABLATE HEART DYSRHYTHM FOCUS  07/2011    Re-do PVI Jul 2011 (RFA-PVI + CFE + VIDYA + confirmation of CTI line)     TOTAL SHOULDER REPLACEMENT Right  2016    Dr. Abernathy of [facility]     WRIST SURGERY Left      ZZC MYERS W/O FACETEC FORAMOT/DSKC 1/ VRT SEG, CERVICAL      Laminectomy Lumbar;  Recorded: 2012;       Allergies:     Allergies   Allergen Reactions     Adhesive Tape Other (See Comments)     ADHESIVE TAPE; SKIN IRRITATION; Skin pulled off with foam tape       Amiodarone      ADVERSE REACTION.  Sunlight sensitivity.     Lisinopril          Family HX:  Family History   Problem Relation Age of Onset     Cancer Mother         leukemia     Cancer Father         bladder     Cancer Sister         breast with lung met.     Aneurysm Sister      CABG Brother      CABG Brother      Valvular heart disease Brother         valve replacement       Social Hx:  Social History     Socioeconomic History     Marital status:      Spouse name: Neela     Number of children: Not on file     Years of education: 12     Highest education level: Not on file   Occupational History     Occupation:      Employer: RETIRED     Occupation: [employer]     Comment: [place]   Social Needs     Financial resource strain: Not on file     Food insecurity     Worry: Not on file     Inability: Not on file     Transportation needs     Medical: Not on file     Non-medical: Not on file   Tobacco Use     Smoking status: Former Smoker     Packs/day: 1.00     Years: 4.00     Pack years: 4.00     Types: Cigarettes     Quit date: 1968     Years since quittin.3     Smokeless tobacco: Never Used   Substance and Sexual Activity     Alcohol use: Yes     Alcohol/week: 2.0 standard drinks     Types: 2 Cans of beer per week     Comment: 1 beer per week     Drug use: No     Sexual activity: Yes     Partners: Female     Birth control/protection: Post-menopausal   Lifestyle     Physical activity     Days per week: Not on file     Minutes per session: Not on file     Stress: Not on file   Relationships     Social connections     Talks on phone: Not on file      Gets together: Not on file     Attends Islam service: Not on file     Active member of club or organization: Not on file     Attends meetings of clubs or organizations: Not on file     Relationship status: Not on file     Intimate partner violence     Fear of current or ex partner: Not on file     Emotionally abused: Not on file     Physically abused: Not on file     Forced sexual activity: Not on file   Other Topics Concern     Not on file   Social History Narrative     Not on file       Current Meds:  Current Outpatient Medications   Medication     acetaminophen (TYLENOL) 325 MG tablet     albuterol (PROAIR HFA/PROVENTIL HFA/VENTOLIN HFA) 108 (90 Base) MCG/ACT inhaler     Ascorbic Acid (VITAMIN C) 500 MG CAPS     aspirin (ASA) 81 MG EC tablet     atorvastatin (LIPITOR) 40 MG tablet     budesonide-formoterol (SYMBICORT) 160-4.5 MCG/ACT Inhaler     bumetanide (BUMEX) 2 MG tablet     co-enzyme Q-10 100 MG CAPS capsule     empagliflozin (JARDIANCE) 10 MG TABS tablet     fish oil-omega-3 fatty acids 1000 MG capsule     FLUoxetine (PROZAC) 20 MG capsule     MAG64 64 MG TBEC CR tablet     metolazone (ZAROXOLYN) 5 MG tablet     metoprolol succinate ER (TOPROL XL) 25 MG 24 hr tablet     multivitamin, therapeutic (THERA-VIT) TABS tablet     nitroGLYcerin (NITROSTAT) 0.4 MG sublingual tablet     OXYGEN-HELIUM IN     polyethylene glycol (MIRALAX) 17 GM/Dose powder     sodium chloride (OCEAN) 0.65 % nasal spray     tiotropium (SPIRIVA) 18 MCG inhaled capsule     vitamin D2 (ERGOCALCIFEROL) 08989 units (1250 mcg) capsule     warfarin ANTICOAGULANT (COUMADIN) 2.5 MG tablet     No current facility-administered medications for this visit.       Physical Exam:  BP (!) 141/76 (BP Location: Right arm, Patient Position: Sitting, Cuff Size: Adult Regular)   Pulse 85   Wt 111.5 kg (245 lb 12.8 oz)   SpO2 91%   BMI 30.72 kg/m  Gen: alert, oriented, no distress  Gen: alert, oriented, no distress  HEENT: nasal turbinates are  unremarkable, no oropharyngeal lesions, no cervical or supraclavicular lymphadenopathy  CV: irreg irreg, no M/G/R  Resp: slight bibasilar crackles. Good air movement. No wheezing.   Abd: soft, nontender, no palpable organomegaly  Skin: no apparent rashes  Ext: 2-3+ pitting edema bilaterally. Significantly worse than last visit.  Neuro: alert, nonfocal    Labs:  Reviewed  Dec 2018  Chem panel wnl  TSH wnl  hgb 12.1 Oct 2018    Previous testing  DONNA neg  blasto neg  Histo testing neg  c-ANCA neg  HIV neg  RF neg    Bronch/LLL BAL cultures neg    Imaging studies:  CT CHEST W/O CONTRAST, DATE/TIME: 5/19/2023 10:38 AM CDT                                                              IMPRESSION:   1.  Unchanged bibasilar peripheral parenchymal scarring. No acute lung, airway, or pleural space abnormality.  2.  Biatrial and right ventricular heart enlargement. Dilated central pulmonary arteries. Findings are consistent with acute pulmonary hypertension. Calcification in the walls of the left atrium likely from prior ablation procedure.    Echo Dec 2021  Interpretation Summary     1. Left ventricular systolic function is normal. The left ventricle is normal  in size. There is normal left ventricular wall thickness.Left ventricular  diastolic function is normal. No regional wall motion abnormalities noted.  2. The right ventricle is mildly dilated. Mildly decreased right ventricular  systolic functio.  3. There is mild to moderate (1-2+) tricuspid regurgitation.  4. The right ventricular systolic pressure is approximated at 45.7mmHg plus  the right atrial pressure.Right ventricular systolic pressure is elevated,  consistent with moderate pulmonary hypertension. Normal RA pressure of 3 mmHg.  6. The ascending aorta is mildly dilated at 42 mm.    RHC 1/24/2022  Exertional dyspnea in a patient with known CAD and severe COPD.  Mild coronary artery disease with patent LAD stents.  LVEDP and PCWP are normal. The PA pressure was  elevated at 66/24 mmHg with a mean pressure of 37 mmHg. See numbers below.  Shikha and TD cardiac outputs were both 5.7 l/min.  Sats on 2L NC oxygen: Ao 93%, PA 60%, RA 59%        PFTs 2018  FEV1/FVC is 0.56 and is reduced.  FEV1 is 73% predicted and is reduced.  FVC is 96% predicted and normal.  There was no improvement in spirometry after a single inhaled dose of bronchodilator.  TLC is 99% predicted and is normal.  RV is 113% predicted and is normal.  DLCO is 93% predicted and is normal when it   is corrected for hemoglobin.  Impression:  Full Pulmonary Function Test is abnormal.  PFTs are consistent with mild obstructive disease.  Spirometry is not consistent with reversibility.  There is no hyperinflation.  There is no air-trapping.  Diffusion capacity when corrected for hemoglobin is normal.  Declines in both FEV1 and TLC since 2009 with overall preservation of diffusing capacity.    PFTs 11/10/2021  FEV1 2.42L 68%  FVC 76%  No BD response  Ratio 0.66 (LLN 0.6)  DLco 46% han for hgb  Flow vol curve suggests obstruction.  Impression: no obstruction based on ratio >LLN but FV curve does suggest some obstruction. Significant decline in DLco compared to DLco.     PFTs 10/27/2022  FEV1 1.98L 57%  FVC 3.23L 68%  FEV1/FVC 61%  DLCO not corrected 34%  The FVC and FEV1 are reduced, but the FEV1/FVC ratio is within normal limits.  The FEV1/FEV6 ratio is reduced.  The inspiratory flow rates are within normal limits.  The TLC is reduced.  The diffusing capacity is reduced.  However, the diffusing capacity was not corrected for the patient's hemoglobin.   IMPRESSION:   Mixed Moderately severe airflow obstruction and restriction.   Severe diffusion defect.  The diffusing capacity was not corrected for the patient's hemoglobin.     July 2019  SIX MINUTE WALK TEST / HOME O2 EVALUATION  SpO2 at rest on RA 96%  SpO2 started to drop after 2 1/2 minutes walking. SpO2 after walking 2 1/2 minutes on RA was 88%  SpO2 after walking 6  minutes on RA was 87%  Distance covered 320.04 meters.  Recovery phase, SpO2 after 1 minute rest on RA was 87%  Recovery phase, SpO2 after 2 minute rest on RA was 97%  Impression:   Significant desaturation with activities.   Patient does qualify for O2 supplementation with activity.    Sleep Study 5/10/2015  Fifield Sleepiness Score =  17  Mallampati Class =   4  Neck Circumference =   17.75 inches  BMI =      32.9  STOP-BANG Scale =   6/8  Primary Findings:  1- Respiratory monitoring showed intermittent snoring but overall no significant obstructive sleep apnea/hypopnea sufficient to disturb sleep (AHI=1.4).  2- Supine sleep was not sampled during the study but the patient does not usually sleep in this position due to shoulder problems.   Other Findings:  Sleep Architecture:   - Sleep onset latency was normal.  - REM onset latency was prolonged.  - All stages of sleep were sampled during the test.  Respiration:  - Mean hemoglobin oxygen saturation (SaO2) during the diagnostic portion of the PSG in NREM and REM sleep was 94% with a SaO2 desaturation eri observed to 89%.  - Sleep-related Hypoxemia was not present.  Movements:      - The patient had a Periodic Limb Movement (PLM) index of 146.8 and the MYRNA PLM index was 6.4  Parasomnia:  - No activity consistent with parasomnia was observed in the video recording.  Electrocardiogram:  - Two-lead ECG showed atrial fibrillation with PVCs.  Electroencephalogram:  - We used a limited-montage EEG with F3, F4, C3, C4, O1, O2 and contra-lateral references to M1 and M2 that was reviewed using a 30-second EPOCH. No EEG abnormalities were observed with these settings.  Diagnosis:  -   780.54 Hypersomnia unspecified.  Assessment & Recommendation:   The results from this study are not sufficient to explain the patient s hypersomnia. Measures aimed at reducing upper airway resistance are recommended for the degree of sleep-disordered breathing that was observed in this study.  Options include: positional therapy to avoid sleep in the supine posture, ENT evaluation for surgery, and dental evaluation for an oral appliance. Patients with excessive daytime sleepiness are at increased risk for motor vehicle accidents.  Shivam Saul MD  [facility]ician[facility]           Again, thank you for allowing me to participate in the care of your patient.        Sincerely,        Hugh Payton MD

## 2024-03-04 NOTE — TELEPHONE ENCOUNTER
Duplicate request in progress. See chart. Dr. Garrett's team managing at this time.      Roni Giles RN [facility]

## 2024-03-04 NOTE — TELEPHONE ENCOUNTER
PC to Neela with recommendation to stay on increased dose of Bumex until labs are drawn. Neela verbalized understanding and has no more questions. She will go get the potassium now from the pharmacy for Buck to take 80 meq's today with food.  -------------------------------------   Domo Garrett MD  You8 minutes ago (3:15 PM)     BW  Let's stay on the higher dose of bumetanide but hold on further metolazone and see what his repeat labs show.    Thanks,  Domo

## 2024-03-04 NOTE — TELEPHONE ENCOUNTER
"Please see patient update.  Do you recommend he go back to usual Bumex dose of 2 mg twice daily?  Thank you,  Moni      PC to patient for an update on symptoms. Discussed with patient and his wife, Neela.   Patient's weight is down 15 pounds from 3/1/2024 when metolazone was started and Bumex was increased to 4 mg twice daily, from 2 mg twice daily, by Dr. Payton.  Feet, ankles and waist are \"back to normal\" and face looks slimmer. Only trace edema in feet. Shortness of breath on exertion is better today but hard to tell with diagnosis of COPD as LIMA is not uncommon for Buck. Per Neela his coloring looks good, hands and nose are not blue \"he is nice and joselyn\".  Patient denies chest pain, tightness, lightheadedness, dizziness, orthopnea, palpitations and syncope. Able to sleep flat last evening.  Weights:  3/4  217.4  3/1  232.2  Metolazone will be stopped and patient will go to [facility] outpatient lab tomorrow for repeat bmp after taking potassium chloride 80 mg tonight with food.  Currently on 8 L NC and oxygen is very difficult to  at this time; reading 57% but she feels this is in error. Tubing checked for kinks and was found under the chair leg. Now up to 92% on the 8L.  Dr. Payton's office called and relayed that Dr. Garrett should recommend diuretic dose adjustments.  Follow up is scheduled with Dr. Garrett 3/15/2024 and Dr. Iglesias 4/17/24.    Message sent to scheduling in hopes of getting patient in sooner for HF OZ asap for sooner follow up.    "

## 2024-03-04 NOTE — TELEPHONE ENCOUNTER
Discussed with patient's wife today the opportunity to come [facility] this [facility] for IVP Bumex as a means of accelerating his diuresis to avoid hospitalization.   She reports that Pulmonary provider ordered Metolazone 5mg tablets x 10 last week and that they have used 4 doses of them, Fri PM, Sat AM, Sun AM and Mon AM.   No BMP labs have been obtained. They report improvement in his LE edema, and wt is down ~15# over the weekend. They are advised NOT To take any further doses of Metolazone at this time.  Patient and wife are advised to come in for labs this AM to assess his K+ and Kidney Function before moving ahead with any IV Bumex doses.   She was in agreement with this plan. Orders placed under Dr. Garrett.    Puja SALDAÑA RN BSN, CHFN, PCCN-K

## 2024-03-04 NOTE — TELEPHONE ENCOUNTER
----- Message from Domo Garrett MD sent at 3/4/2024 12:46 PM CST -----  Moni,    Can you give Buck a call to see if he still feels up on fluid? He should stop metolazone for now and we should have him take 80 mEq potassium chloride supplementation today and repeat labs tomorrow.     I want him seen ASAP by the HF CORE clinic to help manage this going forward, next available with either Dr. Iglesias or one of the APPs as it is hard to manage this without him being seen in person. Let's hold on our plan to do outpatient IV diuresis until he can be seen and we have repeat labs.    ThanksDomo  ----- Message -----  From: Puja Plummer RN  Sent: 3/4/2024  11:44 AM CST  To: Domo Garrett MD; MD Erin Sifuentes Dr.,  I will let you send over a message to Moni ELLINGTON To deal with the patient assessment, potassium supplementation and order followup labs, and Hold on the Metolazone.   [facility] is happy to assist with IV diuretics if you want to go that direction just let me know. I can have our  arrange CORE Consult  with Dr. Iglesias, next available.     Thanks  Puja   ----- Message -----  From: Domo Garrett MD  Sent: 3/4/2024  11:16 AM CST  To: Puja Plummer RN    Definitely no more metolazone. He needs oral potassium supplementation whatever you recommend (maybe  mEq?).     Is he feeling better? If so, maybe hold off on scheduling IV diuresis until we can get him seen in HF CORE clinic. If he still feels very fluid overloaded, then we could still do the plan for IV bumetanide tomorrow then repeat labs on Wednesday.    Domo  ----- Message -----  From: Lab, Background User  Sent: 3/4/2024  11:04 AM CST  To: Domo Garrett MD

## 2024-03-04 NOTE — TELEPHONE ENCOUNTER
Spoke with Buck and wife, Neela. Advised them to get in touch with Cardiology asap. They have already heard from Cardiology. They have given him potassium increase and will redraw labs tomorrow. Informed them to have Cardiology advise them on any orders for bumex.

## 2024-03-04 NOTE — TELEPHONE ENCOUNTER
[facility] Call Center    Phone Message    May a detailed message be left on voicemail: yes     Reason for Call: Medication Question or concern regarding medication   Prescription Clarification  Name of Medication:   bumetanide (BUMEX) 2 MG tablet   Prescribing Provider: kyree       What on the order needs clarification? Pt's wife wants to know what dosage of medication the pt should be taking.       Action Taken: Other: cardiology     Travel Screening: Not Applicable                                                              Thank you!  [facility]

## 2024-03-04 NOTE — TELEPHONE ENCOUNTER
----- Message from Hugh Payton MD sent at 3/4/2024 12:39 PM CST -----  Regarding: FW:  Micah Mak, can you please contact Buck and let him know the kidney function is worse. He should call his cardiologist immediately to discuss what to do with his diuretics. He should go back to the pervious dose he was on. Once again I strongly recommend admission for CHF management.   Thanks,  AS  ----- Message -----  From: Lab, Background User  Sent: 3/4/2024  11:04 AM CST  To: Hugh Payton MD

## 2024-03-04 NOTE — TELEPHONE ENCOUNTER
----- Message from Domo Garrett MD sent at 3/4/2024  8:01 AM CST -----  Regarding: RE: mutual patient  Perhaps we can try IV diuretics in clinic. Have included our HF CORE head Puja to see if we can set this up.    Thanks,  Domo  ----- Message -----  From: Hugh Payton MD  Sent: 3/1/2024   2:31 PM CST  To: Elo Collins MD; Domo Garrett MD; #  Subject: mutual patient                                   Hi,  I saw Buck in clinic today. He is in florid CHF, with significant peripheral edema, SOB, worsening O2 needs. He is refusing hospital admission. I advised him to increase diuretics and I added metolazone. I am also getting a BMP next week. I asked his wife to call Dr. Garrett's team ASAP for further guidance. Told him to go to the ER if he feels worse. Last time he was admitted, he required IV diuretics and diuresis for 40lbs of water weight with dramatic improvement.  Appreciate your help.  Best,  AS

## 2024-03-05 NOTE — TELEPHONE ENCOUNTER
----- Message -----  From: Marcia Blair  Sent: 3/4/2024   3:47 PM CST  To: Moni Gregorio RN  Subject: RE: HF NP                                        hE WON'T GO TO [facility] SO I SCHEDULED FOR 3/11 - THANK YOU

## 2024-03-05 NOTE — TELEPHONE ENCOUNTER
Prior Authorization Not Needed per Pharmacy this was billed incorrectly to [employer] initially and then they realized the error and changed to the correct insurance.  PA request was sent to us in error.    Medication: POTASSIUM CHLORIDE ER 20 MEQ PO TBCR  Insurance Company:    Expected CoPay: $    Pharmacy Filling the Rx: [id number] - [place], [place] - [address] AT [facility] [address]  Pharmacy Notified: Yes  Patient Notified: Pharmacy will notify patient.

## 2024-03-06 NOTE — PROGRESS NOTES
ANTICOAGULATION  MANAGEMENT-Home Monitor Managed by Exception    Buck Sinclair 79 year old male is on warfarin with therapeutic INR result. (Goal INR 2.0-3.0)    Recent labs: (last 7 days)     03/05/24  0000   INR 2.7       Previous INR was Therapeutic  Medication, diet, health changes since last INR:chart reviewed; none identified  Contacted within the last 12 weeks by phone on 2/23/24  Last [facility] referral date: 09/12/2023      LUCAS Jane was NOT contacted regarding therapeutic result today per home monitoring policy manage by exception agreement.   Current warfarin dose is to be continued:     Summary  As of 3/6/2024      Full warfarin instructions:  2.5 mg every day   Next INR check:  3/12/2024             ?   Nasra Oliveira RN  Anticoagulation Clinic  3/6/2024    _______________________________________________________________________     Anticoagulation Episode Summary       Current INR goal:  2.0-3.0   TTR:  77.4% (11.6 mo)   Target end date:  Indefinite   Send INR reminders to:  ANTICOAG HOME MONITORING    Indications    Persistent Atrial Fibrillation (Resolved) [I48.91]  Paroxysmal atrial fibrillation (H) [I48.0]  Persistent atrial fibrillation (H) [I48.19]             Comments:  Home monitor ( [employer] )managed by exception             Anticoagulation Care Providers       Provider Role Specialty Phone number    Erica Hansen APRN CNP Referring Cardiovascular Disease [phone]    Domo Garrett MD Referring Cardiovascular Disease [phone]    Everett Renee MD  Cardiovascular Disease [phone]

## 2024-03-07 NOTE — TELEPHONE ENCOUNTER
Spoke with wife, Neela, today. He is in touch with Cardiology and PCP. Labs have been abnormal. Potassium level was treated, kidney function still abnormal. Cardiology and PCP are following these labs. He has another lab draw tomorrow. He has lost about 20 lbs of fluid now. He has appt with cardiology and PCP next week. CO2 is high. Advised Neela to monitor his oxygen level. She informs sats at 97% at 7 liters. Advised her to turn oxygen down to 6 liters. Oxygen level then down to 94%. Advised her to turn oxygen down to 5 liters and continue to monitor level. He should stay between 90-93%. She will continue to monitor levels and call this RN tomorrow with an update. He is scheduled for a BMP tomorrow.

## 2024-03-07 NOTE — TELEPHONE ENCOUNTER
----- Message from Hugh Payton MD sent at 3/1/2024  3:04 PM CST -----  Regarding: can you check on this meg next week  Hi Buck Mak came in with florid heart failure today. Refused hospital admission. I increased his diuretics and asked him to follow up with cardiology ASAP.  Can you give him and his wife a call next week to see how they're doing? I will be out of town; please discuss with one of the NP's or MD if further direction is needed.  Thanks,  AS

## 2024-03-08 PROBLEM — I50.20 HEART FAILURE WITH REDUCED EJECTION FRACTION (H): Status: ACTIVE | Noted: 2023-01-01

## 2024-03-08 PROBLEM — E87.6 HYPOKALEMIA: Status: ACTIVE | Noted: 2024-01-01

## 2024-03-08 PROBLEM — N17.9 ACUTE KIDNEY INJURY (H): Status: ACTIVE | Noted: 2024-01-01

## 2024-03-08 PROBLEM — N18.30 CKD (CHRONIC KIDNEY DISEASE) STAGE 3, GFR 30-59 ML/MIN (H): Status: ACTIVE | Noted: 2023-01-01

## 2024-03-08 NOTE — PHARMACY-ADMISSION MEDICATION HISTORY
Pharmacist Admission Medication History    Admission medication history is complete. The information provided in this note is only as accurate as the sources available at the time of the update.    Information Source(s): Family member and CareEverywhere/SureScripts via in-person    Pertinent Information:   Patient increased bumex to 4mg BID and started metazolone 5mg on 3/1. On 3/4, metolazone was stopped and patient instructed to take 80mEq potassium chloride that day. Patient has continued to take bumex 4mg BID.     Changes made to PTA medication list:  Added: None  Deleted: None  Changed: None    Allergies reviewed with patient and updates made in EHR: yes    Medication History Completed By: Odessa Lakhani [facility] 3/8/2024 3:42 PM    PTA Med List   Medication Sig Note Last Dose    acetaminophen (TYLENOL) 325 MG tablet Take 650 mg by mouth 2 times daily as needed  Past Month    albuterol (PROAIR HFA/PROVENTIL HFA/VENTOLIN HFA) 108 (90 Base) MCG/ACT inhaler INHALE 2 PUFFS INTO THE LUNGS EVERY 4 HOURS AS NEEDED FOR SHORTNESS OF BREATH OR WHEEZING  3/8/2024 at AM    Ascorbic Acid (VITAMIN C) 500 MG CAPS Take 1,000 mg by mouth daily  3/8/2024 at AM    aspirin (ASA) 81 MG EC tablet Take 1 tablet (81 mg) by mouth daily  3/8/2024 at AM    atorvastatin (LIPITOR) 40 MG tablet Take 40 mg by mouth every evening  3/7/2024 at PM    budesonide-formoterol (SYMBICORT) 160-4.5 MCG/ACT Inhaler Inhale 2 puffs into the lungs 2 times daily  3/8/2024 at AM    bumetanide (BUMEX) 2 MG tablet Take 1 tablet (2 mg) by mouth 2 times daily (Patient taking differently: Take 2 mg by mouth 2 times daily Increase dose to 4mg twice daily starting 3/1 or as instructed by cardiology) 3/8/2024: Increased dose on 3/1 to 4mg twice daily per cardiology 3/8/2024 at AM-4mg    co-enzyme Q-10 100 MG CAPS capsule Take 2 capsules (200 mg) by mouth daily  3/8/2024 at AM    empagliflozin (JARDIANCE) 10 MG TABS tablet Take 1 tablet (10 mg) by mouth daily  3/8/2024 at  MA    fish oil-omega-3 fatty acids 1000 MG capsule Take 1 g by mouth 2 times daily  3/8/2024 at AM    FLUoxetine (PROZAC) 20 MG capsule TAKE 1 CAPSULE BY MOUTH EVERY DAY  3/8/2024 at AM    MAG64 64 MG TBEC CR tablet TAKE 2 TABLETS BY MOUTH EVERY DAY  3/8/2024 at MA    metolazone (ZAROXOLYN) 5 MG tablet Take 1 tablet (5 mg) by mouth daily 3/8/2024: Took 3/1-3/4 as instructed by cardiology.  3/4/2024    metoprolol succinate ER (TOPROL XL) 25 MG 24 hr tablet Take 1 tablet (25 mg) by mouth daily  3/8/2024 at AM    multivitamin, therapeutic (THERA-VIT) TABS tablet Take 1 tablet by mouth daily  3/8/2024 at AM    polyethylene glycol (MIRALAX) 17 GM/Dose powder Take 17 g by mouth daily   3/8/2024 at AM    potassium chloride ER (K-TAB) 20 MEQ CR tablet Take 4 tablets (80 mEq) by mouth daily (Patient taking differently: Take 80 mEq by mouth once) 3/8/2024: Took 80mg once on Monday as instructed by cardiology.  3/4/2024    sodium chloride (OCEAN) 0.65 % nasal spray Spray 2 sprays in nostril 4 times daily as needed  Past Week    tiotropium (SPIRIVA) 18 MCG inhaled capsule Inhale 1 capsule (18 mcg) into the lungs daily  3/8/2024 at AM    vitamin D2 (ERGOCALCIFEROL) 70216 units (1250 mcg) capsule Take 50,000 Units by mouth twice a week On Sunday and Wednesday  3/6/2024    warfarin ANTICOAGULANT (COUMADIN) 2.5 MG tablet Take 1 tablet (2.5 mg) daily or as directed by the INR clinic  3/7/2024 at PM

## 2024-03-08 NOTE — ED TRIAGE NOTES
Pt uses home O2 reports short of breath.     Triage Assessment (Adult)       Row Name 03/08/24 1327          Triage Assessment    Airway WDL WDL        Respiratory WDL    Respiratory WDL X;rhythm/pattern     Rhythm/Pattern, Respiratory shortness of breath        Skin Circulation/Temperature WDL    Skin Circulation/Temperature WDL circulation;X     Skin Circulation cyanosis        Cardiac WDL    Cardiac WDL WDL        Peripheral/Neurovascular WDL    Peripheral Neurovascular WDL capillary refill     Capillary Refill, LUE greater than 3 secs     Capillary Refill, RUE greater than 3 secs     Capillary Refill, LLE greater than 3 secs     Capillary Refill, RLE greater than 3 secs        Cognitive/Neuro/Behavioral WDL    Cognitive/Neuro/Behavioral WDL WDL

## 2024-03-08 NOTE — ED NOTES
"[facility] ED Handoff Report    ED Chief Complaint: Shortness of Breath    ED Diagnosis:  (N17.9) Acute kidney injury (H24)      (E87.6) Hypokalemia      PMH:    Past Medical History:   Diagnosis Date    Anemia     Asthma without status asthmaticus 05/05/2021    Atrial fibrillation (H)     BPH (benign prostatic hyperplasia)     Cardiomyopathy (H)     CKD (chronic kidney disease) stage 3, GFR 30-59 ml/min (H) 05/24/2023    Congestive heart failure (H)     COPD, group B, by GOLD 2017 classification (H)     Coronary artery disease due to calcified coronary lesion     Dyslipidemia, goal LDL below 70     Essential hypertension     Heart failure with reduced ejection fraction (H) 08/17/2023    Hemoptysis 10/04/2017    History of transfusion     Hyperlipidemia     Persistent atrial fibrillation (H)     Pneumonia of left lower lobe due to infectious organism 10/04/2017    Pulmonary hypertension (H)     Skin cancer of trunk     Status post catheter ablation of atrial fibrillation 06/07/2017    PVI 4-2011 (Cryo/PVI + roof line + CTI line) Re-do PVI 7-2011 (RFA/PVI + CFE + VIDYA + confirmed CTI line)    Ventricular tachycardia (H)         Code Status:  Prior     Falls Risk: Yes Band: Applied    Current Living Situation/Residence: lives with a significant other     Elimination Status: Continent: Yes     Activity Level: SBA w/ walker    Patients Preferred Language:  English     Needed: No    Vital Signs:  /73   Pulse 80   Resp 12   Ht 1.905 m (6' 3\")   Wt 100.2 kg (221 lb)   SpO2 95%   BMI 27.62 kg/m       Cardiac Rhythm: Afib    Pain Score: 0/10    Is the Patient Confused:  No    Last Food or Drink: 03/08/24     Focused Assessment: In with SOB, sats 70s. Bipap initiated. Pt now on high flow at 10 L. Lower posterior LS  crackles.     Tests Performed: Done: Labs and Imaging    Treatments Provided: nebs, bolus, potassium    Family Dynamics/Concerns: No    Family Updated On Visitor Policy: Yes    Plan " of Care Communicated to Family: Yes    Who Was Updated about Plan of Care: pt and wife    Belongings Checklist Done and Signed by Patient: Yes    Medications sent with patient: N/A      RN: Kristen Bojorquez RN   3/8/2024 4:54 PM

## 2024-03-08 NOTE — ED PROVIDER NOTES
EMERGENCY DEPARTMENT ENCOUNTER            IMPRESSION:  Dehydration with hypotension  Acute kidney injury  Hypokalemia  Chronic respiratory failure      MEDICAL DECISION MAKING:  It was my pleasure to provide care for Buck Sinclair who presented for evaluation of acute on chronic respiratory failure along with symptoms secondary to dehydration.  He has a history of heart failure.  Cardiology has recently increased his diuretics.  He was found to have acute kidney injury and referred to the emergency department.  He is at his baseline respiratory status    On my exam patient is pleasant and cooperative.   Vital signs show hypertension and hypoxia off oxygen.  Physical exam notable for he is short of breath.  He appears dehydrated.  He speaks in abbreviated sentences..     EKG independently interpreted by myself and shows paced rhythm    He was initially placed on BiPAP for hypoxia.  Albuterol nebulizer administered.  After some improvement RT was consulted to wean the nebulizer    IV fluid was administered for dehydration.  His blood pressure improved    Laboratory investigation independently interpreted and notable for acute kidney injury and hypokalemia.  BNP is also slightly elevated    IV and oral potassium given    Chest x-ray imaging does not show evidence of heart failure or pneumonia.  Imaging independently interpreted by myself and does not show pneumonia or heart failure    Patient will be admitted to hospitalist    ED evaluation is consistent with acute kidney injury secondary to dehydration, per kalemia secondary to dehydration and diuretics, acute on chronic respiratory failure.      =================================================================  CHIEF COMPLAINT:  Chief Complaint   Patient presents with    Shortness of Breath         HPI  Buck Sinclair is a 79 year old male with a history of hypertension, dyslipidemia, COPD, asthma, atrial fibrillation, CAD, CKD stage 3, who presents to the  ED with wife via wheelchair for evaluation of abnormal kidney function and shortness of breath.     Per nurse, the patient is on 6 L O2 at baseline. Upon his arrival in the ED today, he was satting 75% with blood pressure 88/60.     Patient's wife reports that the patient was seen by his pulmonologist on 03/01/2024 (last Friday, 1 week ago), when they were told the patient has fluid in his legs due to his congestive heart failure. She says that the patient was put on more diuretic and has gotten bloodwork at an outpatient clinic on Monday (03/04/2024, 4 days ago), Tuesday (03/05/2024, 3 days ago), and today. Patient's wife says that when they arrived home today, before they even got out of the car, they got a phone call from the patient's provider advising them to visit the ED due to the patient's kidney function.     The patient reports that he has been experiencing shortness of breath, but says that this is per baseline for him. He says that his shortness of breath is not worse than normal. Patient notes that he has been on supplemental oxygen for 4 years.     Per chart review, patient was called by [facility] on 03/07/2024, at which time the patient's wife reported the patient was satting at 97% on 7 L, and was advised to turn O2 down to 6 L due to elevated CO2 on recent labs. After adjusting to 6 L, patient was satting 94%, so patient's wife was advised to turn O2 down to 5 L and continue monitoring levels.     Per chart review, the patient was seen in [facility] for a pulmonology clinic follow-up visit on 03/01/2024. Patient presented with increasing shortness of breath and low O2 levels, and reported that his breathing was very poor. Patient was discharged to home with instructions to increase bumetanide to 4 mg twice daily, start metolazone 5 mg every day, limit salt and fluid intake, exercise as tolerated, and follow-up with PCP, return to pulmonology  in 1-2 months. Patient declined hospital admission at this time.       REVIEW OF SYSTEMS  Constitutional: Does not report chills, unintentional weight loss or fatigue   Eyes: Does not report visual changes or discharge    HENT: Does not report sore throat, ear pain or neck pain  Respiratory: Does not report cough. Reports shortness of breath.     Cardiovascular: Does not report chest pain, palpitations or leg swelling  GI: Does not report abdominal pain, nausea, vomiting, or dark, bloody stools.    : Does not report hematuria, dysuria, or flank pain  Musculoskeletal: Does not report any new musculoskeletal pain or new muscle/joint pains  Skin: Does not report rash or wound  Neurologic: Does not report current headache, new weakness, focal weakness, or sensory changes        Remainder of systems reviewed, unless noted in HPI all others negative.      PAST MEDICAL HISTORY:  Past Medical History:   Diagnosis Date    Anemia     Asthma without status asthmaticus 05/05/2021    Atrial fibrillation (H)     BPH (benign prostatic hyperplasia)     Cardiomyopathy (H)     CKD (chronic kidney disease) stage 3, GFR 30-59 ml/min (H) 05/24/2023    Congestive heart failure (H)     COPD, group B, by GOLD 2017 classification (H)     Coronary artery disease due to calcified coronary lesion     Dyslipidemia, goal LDL below 70     Essential hypertension     Heart failure with reduced ejection fraction (H) 08/17/2023    Hemoptysis 10/04/2017    History of transfusion     Hyperlipidemia     Persistent atrial fibrillation (H)     Pneumonia of left lower lobe due to infectious organism 10/04/2017    Pulmonary hypertension (H)     Skin cancer of trunk     Status post catheter ablation of atrial fibrillation 06/07/2017    PVI 4-2011 (Cryo/PVI + roof line + CTI line) Re-do PVI 7-2011 (RFA/PVI + CFE + VIDYA + confirmed CTI line)    Ventricular tachycardia (H)        PAST SURGICAL HISTORY:  Past Surgical History:   Procedure Laterality Date     CARDIAC DEFIBRILLATOR PLACEMENT      CARDIOVERSION  07/11/2018    x20, last 2/12/15, 10/2015, 11/18/16, 6/16/17 by Lauren Foster CNP    CARDIOVERSION  07/11/2018    CARDIOVERSION  11/19/2021    COLONOSCOPY N/A 04/28/2017    Procedure: COLONOSCOPY with 2 ascending polyps and 1 transverse polyp;  Surgeon: Jose Whittington MD;  Location: [facility];  Service:     CORONARY ANGIOGRAPHY ADULT ORDER      CV CORONARY ANGIOGRAM N/A 09/20/2017    Procedure: Coronary Angiogram;  Surgeon: Sergio Cervantes MD;  Location: [facility] Cath Lab;  Service:     CV CORONARY ANGIOGRAM N/A 01/24/2022    Procedure: Coronary Angiogram;  Surgeon: Christi Saunders MD;  Location: [facility] CATH LAB CV    CV LEFT HEART CATH N/A 01/24/2022    Procedure: Left Heart Cath;  Surgeon: Christi Saunders MD;  Location: [facility] CATH LAB CV    CV RIGHT AND LEFT HEART CATH N/A 01/24/2022    Procedure: Right and Left Heart Catherization;  Surgeon: Christi Saunders MD;  Location: [facility] CV    EP ICD GENERATOR REPLACEMENT DUAL N/A 10/28/2022    Procedure: Implantable Cardioverter Defibrillator Generator Replacement Dual;  Surgeon: Elo Collins MD;  Location: [facility] LAB CV    EP ICD INSERT      FRACTURE SURGERY Left     wrist    HEART CATH, ANGIOPLASTY      IMPLANT AUTOMATIC IMPLANTABLE CARDIOVERTER DEFIBRILLATOR      INGUINAL HERNIA REPAIR Left 1967    while in the [employer] in [place] after 13 month in [place]    INSERT / REPLACE / REMOVE PACEMAKER      IR MISCELLANEOUS PROCEDURE  04/30/2014    OTHER SURGICAL HISTORY      left hand surgery---tendon repair    MS ABLATE HEART DYSRHYTHM FOCUS  04/2011    Catheter Ablation Atrial Fibrillation PVI Apr 2011 (Cryo+RF-PVI + roof line + CTI line)    MS ABLATE HEART DYSRHYTHM FOCUS  07/2011    Re-do PVI Jul 2011 (RFA-PVI + CFE + VIDYA + confirmation of CTI line)    TOTAL SHOULDER REPLACEMENT Right 03/03/2016    Dr. Abernathy of [facility]    WRIST SURGERY Left     [facility] W/O  FACETEC FORAMOT/ARELY 1/2 VRT SEG, CERVICAL      Laminectomy Lumbar;  Recorded: 03/09/2012;         CURRENT MEDICATIONS:    acetaminophen (TYLENOL) 325 MG tablet  albuterol (PROAIR HFA/PROVENTIL HFA/VENTOLIN HFA) 108 (90 Base) MCG/ACT inhaler  Ascorbic Acid (VITAMIN C) 500 MG CAPS  aspirin (ASA) 81 MG EC tablet  atorvastatin (LIPITOR) 40 MG tablet  budesonide-formoterol (SYMBICORT) 160-4.5 MCG/ACT Inhaler  bumetanide (BUMEX) 2 MG tablet  co-enzyme Q-10 100 MG CAPS capsule  empagliflozin (JARDIANCE) 10 MG TABS tablet  fish oil-omega-3 fatty acids 1000 MG capsule  FLUoxetine (PROZAC) 20 MG capsule  MAG64 64 MG TBEC CR tablet  metolazone (ZAROXOLYN) 5 MG tablet  metoprolol succinate ER (TOPROL XL) 25 MG 24 hr tablet  multivitamin, therapeutic (THERA-VIT) TABS tablet  nitroGLYcerin (NITROSTAT) 0.4 MG sublingual tablet  OXYGEN-HELIUM IN  polyethylene glycol (MIRALAX) 17 GM/Dose powder  potassium chloride ER (K-TAB) 20 MEQ CR tablet  sodium chloride (OCEAN) 0.65 % nasal spray  tiotropium (SPIRIVA) 18 MCG inhaled capsule  vitamin D2 (ERGOCALCIFEROL) 75913 units (1250 mcg) capsule  warfarin ANTICOAGULANT (COUMADIN) 2.5 MG tablet        ALLERGIES:  Allergies   Allergen Reactions    Adhesive Tape Other (See Comments)     ADHESIVE TAPE; SKIN IRRITATION; Skin pulled off with foam tape      Amiodarone      ADVERSE REACTION.  Sunlight sensitivity.    Lisinopril        FAMILY HISTORY:  Family History   Problem Relation Age of Onset    Cancer Mother         leukemia    Cancer Father         bladder    Cancer Sister         breast with lung met.    Aneurysm Sister     CABG Brother     CABG Brother     Valvular heart disease Brother         valve replacement       SOCIAL HISTORY:   Social History     Socioeconomic History    Marital status:    Tobacco Use    Smoking status: Former     Packs/day: 1.00     Years: 4.00     Additional pack years: 0.00     Total pack years: 4.00     Types: Cigarettes     Quit date: 1/1/1968      "Years since quittin.2    Smokeless tobacco: Never   Vaping Use    Vaping Use: Never used   Substance and Sexual Activity    Alcohol use: Yes     Alcohol/week: 2.0 standard drinks of alcohol     Comment: Alcoholic Drinks/day: 1 beer per week    Drug use: No    Sexual activity: Yes     Partners: Female     Birth control/protection: Post-menopausal   Social History Narrative    Preloaded 2013       PHYSICAL EXAM:    BP (!) 88/59   Pulse 80   Resp 25   Ht 1.905 m (6' 3\")   Wt 100.2 kg (221 lb)   SpO2 96%   BMI 27.62 kg/m      Constitutional: Awake, alert, winded abbreviated speech  Head: Normocephalic, atraumatic.  ENT: Mucous membranes are moist.  No pallor.   Eyes: Pupils are reactive.  No discoloration.  Neck: No lymphadenopathy, no stridor, supple, no soft tissue swelling  Chest: No tenderness   Respiratory: Diminished breath sounds bilaterally  Cardiovascular: Regular rate and rhythm.  Good overall perfusion.  Upper and lower extremity pulses are equal.  GI: Abdomen soft, non-tender to palpation.  No guarding or rebound. Bowel sounds present throughout.   Back: No CVA tenderness.    Musculoskeletal: Moves all 4 extremities equally, full function and capacity no peripheral edema.   Integument: Warm, dry. No rash. No bruising or petechiae.  Neurologic: Alert & oriented x 3. Normal speech. Grossly normal motor and sensory function. No focal deficits noted.  N  Psychiatric: Normal mood and affect.  Appropriate judgement.    ED COURSE:  1:22 PM I met with the patient, obtained history, performed an initial exam, and discussed options and plan for diagnostics and treatment here in the ED.  2:47 PM I spoke on the phone with Dr. Martin, hospitalist, who accepts this patient for admission.       Medical Decision Making    History:  Supplemental history from: Wife, nurse  External Record(s) reviewed: External medical records including care everywhere reviewed, Phone call follow-up 2024, Pulmonology " visit 03/01/2024    Work Up:  EKG, laboratory and imaging studies as ordered were independently interpreted by myself.   Broad differential diagnosis considered for respiratory failure  The patient's presentation was of high complexity.     External consultation:  Discussion of management with another provider: Hospitalist    Complicating factors:  Patient has a complicated past medical history including COPD, hypertension, dyslipidemia, atrial fibrillation, CAD, CKD stage 3  Care affected by social determinants of health: Access to primary care        LAB:  Laboratory results were independently reviewed and interpreted  Results for orders placed or performed during the hospital encounter of 03/08/24   XR Chest Port 1 View    Impression    IMPRESSION: Right cardiac generator and leads are unchanged. Bibasilar scarring/atelectasis. No focal airspace opacity. No pleural effusion or pneumothorax. Unchanged cardiomegaly. Normal mediastinal contours.   Basic metabolic panel   Result Value Ref Range    Sodium 137 135 - 145 mmol/L    Potassium 2.3 (LL) 3.4 - 5.3 mmol/L    Chloride 88 (L) 98 - 107 mmol/L    Carbon Dioxide (CO2) 33 (H) 22 - 29 mmol/L    Anion Gap 16 (H) 7 - 15 mmol/L    Urea Nitrogen 65.3 (H) 8.0 - 23.0 mg/dL    Creatinine 3.03 (H) 0.67 - 1.17 mg/dL    GFR Estimate 20 (L) >60 mL/min/1.73m2    Calcium 9.9 8.8 - 10.2 mg/dL    Glucose 141 (H) 70 - 99 mg/dL   Result Value Ref Range    Troponin T, High Sensitivity 77 (H) <=22 ng/L   Nt probnp inpatient (BNP)   Result Value Ref Range    N terminal Pro BNP Inpatient 6,204 (H) 0 - 1,800 pg/mL   Result Value Ref Range    Magnesium 2.5 (H) 1.7 - 2.3 mg/dL   Lactic acid whole blood   Result Value Ref Range    Lactic Acid 2.4 (H) 0.7 - 2.0 mmol/L   CBC with platelets and differential   Result Value Ref Range    WBC Count 8.3 4.0 - 11.0 10e3/uL    RBC Count 4.24 (L) 4.40 - 5.90 10e6/uL    Hemoglobin 13.2 (L) 13.3 - 17.7 g/dL    Hematocrit 42.3 40.0 - 53.0 %     78  - 100 fL    MCH 31.1 26.5 - 33.0 pg    MCHC 31.2 (L) 31.5 - 36.5 g/dL    RDW 15.8 (H) 10.0 - 15.0 %    Platelet Count 176 150 - 450 10e3/uL    % Neutrophils 62 %    % Lymphocytes 23 %    % Monocytes 12 %    % Eosinophils 2 %    % Basophils 1 %    % Immature Granulocytes 0 %    NRBCs per 100 WBC 0 <1 /100    Absolute Neutrophils 5.2 1.6 - 8.3 10e3/uL    Absolute Lymphocytes 1.9 0.8 - 5.3 10e3/uL    Absolute Monocytes 1.0 0.0 - 1.3 10e3/uL    Absolute Eosinophils 0.2 0.0 - 0.7 10e3/uL    Absolute Basophils 0.0 0.0 - 0.2 10e3/uL    Absolute Immature Granulocytes 0.0 <=0.4 10e3/uL    Absolute NRBCs 0.0 10e3/uL   Extra Blue Top Tube   Result Value Ref Range    Hold Specimen JIC    Extra Red Top Tube   Result Value Ref Range    Hold Specimen JIC    Result Value Ref Range    INR 2.94 (H) 0.85 - 1.15   Blood gas venous   Result Value Ref Range    pH Venous 7.47 (H) 7.32 - 7.43    pCO2 Venous 53 (H) 40 - 50 mm Hg    pO2 Venous 22 (L) 25 - 47 mm Hg    Bicarbonate Venous 39 (H) 21 - 28 mmol/L    Base Excess/Deficit Venous 14.9 (H) -3.0 - 3.0 mmol/L    FIO2 40     Oxyhemoglobin Venous 33 (L) 70 - 75 %    O2 Sat, Venous 33.2 (L) 70.0 - 75.0 %         RADIOLOGY:  Radiology reports were independently reviewed and interpreted  XR Chest Port 1 View   Final Result   IMPRESSION: Right cardiac generator and leads are unchanged. Bibasilar scarring/atelectasis. No focal airspace opacity. No pleural effusion or pneumothorax. Unchanged cardiomegaly. Normal mediastinal contours.           EKG:    ECG results from 05/18/23   ECG 12-LEAD WITH MUSE ([facility])     Value    Systolic Blood Pressure     Diastolic Blood Pressure     Ventricular Rate 80    Atrial Rate 75    IA Interval     QRS Duration 188        QTc 613    P Axis     R AXIS 189    T Axis 95    Interpretation ECG      Ventricular-paced rhythm  Abnormal ECG  When compared with ECG of 18-MAY-2023 08:43,  Current undetermined rhythm precludes rhythm comparison, needs review  Confirmed  by KENNETH THOMPSON MD LOC:JN ([id number]) on 5/23/2023 7:54:25 AM     ECG 12-LEAD WITH MUSE ([facility])     Value    Systolic Blood Pressure     Diastolic Blood Pressure     Ventricular Rate 73    Atrial Rate 72    WY Interval     QRS Duration 114        QTc 524    P Axis     R AXIS 129    T Axis 186    Interpretation ECG      Electronic atrial pacemaker  long av delay with V paced beats  with occasional Premature ventricular complexes and some native v beats  Possible Right ventricular hypertrophy  Inferior infarct , age undetermined  ST & T wave abnormality, consider anterolateral ischemia  Prolonged QT  Abnormal ECG  When compared with ECG of 22-MAY-2023 12:22,  some native beats now seen as are a paced beats  Confirmed by CHELE GODFREY MD LOC:WW ([id number]) on 5/26/2023 4:53:19 PM       *Note: Due to a large number of results and/or encounters for the requested time period, some results have not been displayed. A complete set of results can be found in Results Review.       I have independently reviewed and interpreted the EKG(s) documented above.        CRITICAL CARE:  Upon my evaluation this patient had a high probability of imminent or life-threatening deterioration due to respiratory failure hypokalemia.   -Replace with potassium replacement protocol, which required my direct attention intervention and personal management.    I have personally provided 120 minutes of critical care time exclusive of time spent on separately billed procedures.  Time includes review of the laboratory data radiology results and discussion with consultants and monitoring for potential decompensation.      MEDICATIONS GIVEN IN THE EMERGENCY:  Medications   potassium chloride 10 mEq in 100 mL sterile water infusion (10 mEq Intravenous $New Bag 3/8/24 1433)   sodium chloride 0.9% BOLUS 500 mL (0 mLs Intravenous Stopped 3/8/24 1502)   ipratropium - albuterol 0.5 mg/2.5 mg/3 mL (DUONEB) neb solution 3 mL (3 mLs Nebulization $Given 3/8/24  1402)   potassium chloride (KAYCIEL) solution 20 mEq (20 mEq Oral $Given 3/8/24 1431)           NEW PRESCRIPTIONS STARTED AT TODAY'S ER VISIT:  New Prescriptions    No medications on file                FINAL DIAGNOSIS:    ICD-10-CM    1. Acute kidney injury (H24)  N17.9       2. Hypokalemia  E87.6                  NAME: Buck Sinclair  AGE: 79 year old male  YOB: 1945  MRN: [id number]  EVALUATION DATE & TIME: 3/8/2024  1:23 PM    PCP: Vivek Guerrero    ED PROVIDER: Chucky Gentile M.D.      I, Veronica Cooper, am serving as a scribe to document services personally performed by Dr. Chucky Gentile based on my observation and the provider's statements to me. I, Chucky Gentile MD attest that Veronica Cooper is acting in a scribe capacity, has observed my performance of the services and has documented them in accordance with my direction.    Chucky Gentile M.D.  Emergency Medicine  [facility] EMERGENCY DEPARTMENT  [address] [place] [id number]  [phone]  Dept: [phone]  3/8/2024         Chucky Gentile MD  03/08/24 8035

## 2024-03-08 NOTE — H&P
[facility]    History and Physical - Hospitalist Service       Date of Admission:  3/8/2024    Assessment & Plan      Buck Sinclair is a 79 year old male admitted on 3/8/2024. He presented with weakness/fatigue and noted hypotension.    CHRISSY on CKD3a baseline Cr 1.63 11/23 and slow climb since 3/4 from 2/27 to 3.03 with increase in diuresis  Hypokalemia  --IVF gentle 500ml NS in ED and trend Cr and respiratory/volume status with another 500ml NS ordered for overnight then stop  --replace potassium  --hold PTA Bumex and Metolazone  --renally dose meds and avoid nephrotoxins  --if not improving, consider renal consultation    Elevated BNP in setting of elevated Cr  HFpEF  ECHO 5/23 EF 55-60% but severe pulmonary HTN  Benign essential HTN  Persistent Afib  --hold ACE  --suspect some elevation due to renal issues but also known CHF but more hypovolemic at this time  --recheck ECHO as almost 1 year  --continue PTA ASA and atorvastatin  --hold PTA Bumex 4 mg BID as titrated up 3/1 and suspect etiology for CHRISSY on CKD  --continue Jardiance  --hold PTA metolazone  --continue PTA metoprolol succinate EF 25mg every day  --continue PTA warfarin pharmacy to dose  --cardiology consultation    Mild elevated lactic acid secondary to dehydration  --repeat improved after IVF    Chronic hypoxic respiratory failure  COPD  --baseline oxygen 6L NC  --hypoxic saturations and tachypnea at time when oxygen was not on when initially presented, no acute respiratory failure but was placed briefly on bipap while sorting  --goal oxygen 88-92% as at risk for hypercarbia  --continue PTA spiriva  --continue PTA symbicort  --continue PTA albuterol        Diet: Combination Diet 2 gm NA Diet; Low Saturated Fat Na <2400mg Diet, No Caffeine Diet  DVT Prophylaxis: Warfarin  Sawyer Catheter: Not present  Lines: None     Cardiac Monitoring: None  Code Status: Full Code    Clinically Significant Risk Factors Present on Admission  "       # Hypokalemia: Lowest K = 2.3 mmol/L in last 2 days, will replace as needed        # Drug Induced Coagulation Defect: home medication list includes an anticoagulant medication  # Drug Induced Platelet Defect: home medication list includes an antiplatelet medication  # Acute Kidney Injury, unspecified: based on a >150% or 0.3 mg/dL increase in last creatinine compared to past 90 day average, will monitor renal function  # Hypertension: Noted on problem list  # Chronic heart failure with preserved ejection fraction: heart failure noted on problem list and last echo with EF >50%      # DMII: A1C = N/A within past 6 months  --not on meds and note noted in history  # Overweight: Estimated body mass index is 27.62 kg/m  as calculated from the following:    Height as of this encounter: 1.905 m (6' 3\").    Weight as of this encounter: 100.2 kg (221 lb).       # COPD: noted on problem list  # ICD device present       Disposition Plan      Expected Discharge Date: 03/11/2024    Discharge Delays: Echo Result Pending (enter specific test & time in comments)  Specialist Consult (enter specialist & decision needed in comments)  Lab Result Pending (enter specific test & time in comments)              Tawanna Martin MD  Hospitalist Service  [facility]  Securely message with [employer] (more info)  Text page via [employer] Paging/Directory     ______________________________________________________________________    Chief Complaint   Sent to ED due to acute kidney injury    History is obtained from the patient, electronic health record, emergency department physician, and patient's spouse    History of Present Illness   Buck Sinclair is a 79 year old male who has extensive cardiac and pulmonary history with recent outpatient aggressive increase in diuretics who has had progressive climb in Cr and sent to ED.  Initially oxygen saturations checked while off oxygen.  BP was low.  Started BiPAP " however deemed stable from respiratory state as chronic oxygen use.  BiPAP was stopped and he reports feeling better after gentle hydration from ED provider.  Cr was double baseline.  Potassium low.  He did need 8L oxygen at rest with LE edema and was 10L with activity but typically 6L NC.      Seen on nursing floor and was 95% on 10L.  Of note he has hx of hypercarbia thus discussed goal saturations 88-92% with RN.  Denies CP or SOB.  No N/V.  No dizziness.        Past Medical History    Past Medical History:   Diagnosis Date    Anemia     Asthma without status asthmaticus 05/05/2021    Atrial fibrillation (H)     BPH (benign prostatic hyperplasia)     Cardiomyopathy (H)     CKD (chronic kidney disease) stage 3, GFR 30-59 ml/min (H) 05/24/2023    Congestive heart failure (H)     COPD, group B, by GOLD 2017 classification (H)     Coronary artery disease due to calcified coronary lesion     Dyslipidemia, goal LDL below 70     Essential hypertension     Heart failure with reduced ejection fraction (H) 08/17/2023    Hemoptysis 10/04/2017    History of transfusion     Hyperlipidemia     Persistent atrial fibrillation (H)     Pneumonia of left lower lobe due to infectious organism 10/04/2017    Pulmonary hypertension (H)     Skin cancer of trunk     Status post catheter ablation of atrial fibrillation 06/07/2017    PVI 4-2011 (Cryo/PVI + roof line + CTI line) Re-do PVI 7-2011 (RFA/PVI + CFE + VIDYA + confirmed CTI line)    Ventricular tachycardia (H)        Past Surgical History   Past Surgical History:   Procedure Laterality Date    CARDIAC DEFIBRILLATOR PLACEMENT      CARDIOVERSION  07/11/2018    x20, last 2/12/15, 10/2015, 11/18/16, 6/16/17 by Lauren Foster CNP    CARDIOVERSION  07/11/2018    CARDIOVERSION  11/19/2021    COLONOSCOPY N/A 04/28/2017    Procedure: COLONOSCOPY with 2 ascending polyps and 1 transverse polyp;  Surgeon: Jose Whittington MD;  Location: [facility];  Service:     CORONARY ANGIOGRAPHY ADULT  ORDER      CV CORONARY ANGIOGRAM N/A 09/20/2017    Procedure: Coronary Angiogram;  Surgeon: Sergio Cervantes MD;  Location: [facility] Cath Lab;  Service:     CV CORONARY ANGIOGRAM N/A 01/24/2022    Procedure: Coronary Angiogram;  Surgeon: Christi Saunders MD;  Location: [facility] CATH LAB CV    CV LEFT HEART CATH N/A 01/24/2022    Procedure: Left Heart Cath;  Surgeon: Christi Saunders MD;  Location: [facility] CATH LAB CV    CV RIGHT AND LEFT HEART CATH N/A 01/24/2022    Procedure: Right and Left Heart Catherization;  Surgeon: Christi Saunders MD;  Location: [facility] CATH LAB CV    EP ICD GENERATOR REPLACEMENT DUAL N/A 10/28/2022    Procedure: Implantable Cardioverter Defibrillator Generator Replacement Dual;  Surgeon: Elo Collins MD;  Location: [facility] CATH LAB CV    EP ICD INSERT      FRACTURE SURGERY Left     wrist    HEART CATH, ANGIOPLASTY      IMPLANT AUTOMATIC IMPLANTABLE CARDIOVERTER DEFIBRILLATOR      INGUINAL HERNIA REPAIR Left 1967    while in the [employer] in [place] after 13 month in [place]    INSERT / REPLACE / REMOVE PACEMAKER      IR MISCELLANEOUS PROCEDURE  04/30/2014    OTHER SURGICAL HISTORY      left hand surgery---tendon repair    OR ABLATE HEART DYSRHYTHM FOCUS  04/2011    Catheter Ablation Atrial Fibrillation PVI Apr 2011 (Cryo+RF-PVI + roof line + CTI line)    OR ABLATE HEART DYSRHYTHM FOCUS  07/2011    Re-do PVI Jul 2011 (RFA-PVI + CFE + VIDYA + confirmation of CTI line)    TOTAL SHOULDER REPLACEMENT Right 03/03/2016    Dr. Abernathy of [facility]    WRIST SURGERY Left     ZZC MYERS W/O FACETEC FORAMOT/DSKC 1/2 VRT SEG, CERVICAL      Laminectomy Lumbar;  Recorded: 03/09/2012;       Prior to Admission Medications   Prior to Admission Medications   Prescriptions Last Dose Informant Patient Reported? Taking?   Ascorbic Acid (VITAMIN C) 500 MG CAPS 3/8/2024 at AM  Yes Yes   Sig: Take 1,000 mg by mouth daily   FLUoxetine (PROZAC) 20 MG capsule 3/8/2024 at AM  Yes Yes   Sig: TAKE 1  "CAPSULE BY MOUTH EVERY DAY   MAG64 64 MG TBEC CR tablet 3/8/2024 at MA  No Yes   Sig: TAKE 2 TABLETS BY MOUTH EVERY DAY   OXYGEN-HELIUM IN   Yes No   Si-5 L    acetaminophen (TYLENOL) 325 MG tablet Past Month  Yes Yes   Sig: Take 650 mg by mouth 2 times daily as needed   albuterol (PROAIR HFA/PROVENTIL HFA/VENTOLIN HFA) 108 (90 Base) MCG/ACT inhaler 3/8/2024 at AM  No Yes   Sig: INHALE 2 PUFFS INTO THE LUNGS EVERY 4 HOURS AS NEEDED FOR SHORTNESS OF BREATH OR WHEEZING   aspirin (ASA) 81 MG EC tablet 3/8/2024 at AM  No Yes   Sig: Take 1 tablet (81 mg) by mouth daily   atorvastatin (LIPITOR) 40 MG tablet 3/7/2024 at PM  Yes Yes   Sig: Take 40 mg by mouth every evening   budesonide-formoterol (SYMBICORT) 160-4.5 MCG/ACT Inhaler 3/8/2024 at AM  No Yes   Sig: Inhale 2 puffs into the lungs 2 times daily   bumetanide (BUMEX) 2 MG tablet 3/8/2024 at AM-4mg  No Yes   Sig: Take 1 tablet (2 mg) by mouth 2 times daily   Patient taking differently: Take 2 mg by mouth 2 times daily Increase dose to 4mg twice daily starting 3/1 or as instructed by cardiology   co-enzyme Q-10 100 MG CAPS capsule 3/8/2024 at AM  Yes Yes   Sig: Take 2 capsules (200 mg) by mouth daily   empagliflozin (JARDIANCE) 10 MG TABS tablet 3/8/2024 at MA  No Yes   Sig: Take 1 tablet (10 mg) by mouth daily   fish oil-omega-3 fatty acids 1000 MG capsule 3/8/2024 at AM  Yes Yes   Sig: Take 1 g by mouth 2 times daily   metolazone (ZAROXOLYN) 5 MG tablet 3/4/2024  No Yes   Sig: Take 1 tablet (5 mg) by mouth daily   metoprolol succinate ER (TOPROL XL) 25 MG 24 hr tablet 3/8/2024 at AM  No Yes   Sig: Take 1 tablet (25 mg) by mouth daily   multivitamin, therapeutic (THERA-VIT) TABS tablet 3/8/2024 at AM  Yes Yes   Sig: Take 1 tablet by mouth daily   nitroGLYcerin (NITROSTAT) 0.4 MG sublingual tablet   No No   Sig: One tablet under the tongue every 5 minutes if needed for chest pain. May repeat every 5 minutes for a maximum of 3 doses in 15 minutes\"   polyethylene " glycol (MIRALAX) 17 GM/Dose powder 3/8/2024 at AM  Yes Yes   Sig: Take 17 g by mouth daily    potassium chloride ER (K-TAB) 20 MEQ CR tablet 3/4/2024  No Yes   Sig: Take 4 tablets (80 mEq) by mouth daily   Patient taking differently: Take 80 mEq by mouth once   sodium chloride (OCEAN) 0.65 % nasal spray Past Week  Yes Yes   Sig: Spray 2 sprays in nostril 4 times daily as needed   tiotropium (SPIRIVA) 18 MCG inhaled capsule 3/8/2024 at AM  No Yes   Sig: Inhale 1 capsule (18 mcg) into the lungs daily   vitamin D2 (ERGOCALCIFEROL) 18389 units (1250 mcg) capsule 3/6/2024  Yes Yes   Sig: Take 50,000 Units by mouth twice a week On Sunday and Wednesday   warfarin ANTICOAGULANT (COUMADIN) 2.5 MG tablet 3/7/2024 at PM  No Yes   Sig: Take 1 tablet (2.5 mg) daily or as directed by the INR clinic      Facility-Administered Medications: None           Physical Exam   Vital Signs:     BP: 110/73 Pulse: 80   Resp: 12 SpO2: 95 % O2 Device: High Flow Nasal Cannula (HFNC) Oxygen Delivery: 10 LPM  Weight: 221 lbs 0 oz    General: Alert, cooperative elderly male, lying in bed with HOB elevated, No apparent distress  Head: Normocephalic, atraumatic  Eyes: Pupils equal, round and reactive, Extra-ocular movements intact  Mouth: Mucus membranes moist  Neck: Supple  Cardiovascular: Irregular rate and rhythm, Normal S1, S2  Lungs: Clear to auscultation bilaterally  Abdomen: Soft, Non-tender, not distended, Bowel sounds present  Extremities: No edema, no clubbing  Skin: Warm and well-perfused without lesions.  Neurologic: Alert and oriented. Face is symmetric, Moves all extremities equally      Medical Decision Making       62 MINUTES SPENT BY ME on the date of service doing chart review, history, exam, documentation & further activities per the note.      Data     I have personally reviewed the following data over the past 24 hrs:    8.3  \   13.2 (L)   / 176     137 88 (L) 65.3 (H) /  141 (H)   2.3 (LL) 33 (H) 3.03 (H) \     Trop: 77 (H)  BNP: 6,204 (H)     Procal: N/A CRP: N/A Lactic Acid: 1.8       INR:  2.94 (H) PTT:  N/A   D-dimer:  N/A Fibrinogen:  N/A       Imaging results reviewed over the past 24 hrs:   Recent Results (from the past 24 hour(s))   XR Chest Port 1 View    Narrative    EXAM: XR CHEST PORT 1 VIEW  LOCATION: [facility]  DATE: 3/8/2024    INDICATION: sob  COMPARISON: 05/25/2023      Impression    IMPRESSION: Right cardiac generator and leads are unchanged. Bibasilar scarring/atelectasis. No focal airspace opacity. No pleural effusion or pneumothorax. Unchanged cardiomegaly. Normal mediastinal contours.

## 2024-03-08 NOTE — PROGRESS NOTES
PT placed on BiPAP for increased SOB and hypoxia. Initial settings were S/T, 16 BPM, 10/5, 30% Fi02. Both oxygenation and work of breathing improved. RT will continue to follow.     David Marques, RT

## 2024-03-09 PROBLEM — I27.20 PULMONARY HYPERTENSION (H): Status: ACTIVE | Noted: 2023-01-01

## 2024-03-09 NOTE — CONSULTS
"Care Management Initial Consult    General Information  Assessment completed with: Patient, Spouse or significant other, Miguel Keita  Type of CM/SW Visit: Initial Assessment    Primary Care Provider verified and updated as needed: Yes   Readmission within the last 30 days: no previous admission in last 30 days      Reason for Consult: discharge planning  Advance Care Planning: Advance Care Planning Reviewed: no concerns identified          Communication Assessment  Patient's communication style: spoken language (English or Bilingual)             Cognitive  Cognitive/Neuro/Behavioral: WDL                      Living Environment:   People in home: spouse  Buck and wife Neela  Current living Arrangements: house (\"2 story house. Bedroom is on the 2nd floor\")      Able to return to prior arrangements: yes       Family/Social Support:  Care provided by: self  Provides care for: no one  Marital Status:   Wife  Neela       Description of Support System: Supportive, Involved    Support Assessment: Adequate family and caregiver support, Adequate social supports, Patient communicates needs well met    Current Resources:   Patient receiving home care services: No     Community Resources: DME (\"Home Oxygen from [employer] at 6-10LPM continuous\".)  Equipment currently used at home: walker, rolling (4WW)  Supplies currently used at home: Oxygen Tubing/Supplies, Hearing Aid Batteries, Other (\"Home Oxygen from [employer] at 6-10LPM continuous. Hearing aids and reading glasses\".)    Employment/Financial:  Employment Status: retired, , previous service     Employment/ Comments: \"I use my  benefits for my hearing aids only\".  Financial Concerns:     Referral to Financial Worker: No       Does the patient's insurance plan have a 3 day qualifying hospital stay waiver?  No      Functional Status:  Prior to admission patient needed assistance:   Dependent ADLs:: Ambulation-walker, Independent  Dependent " "IADLs:: Cleaning, Cooking, Laundry, Meal Preparation, Shopping (\"wife helps\")       Mental Health Status:  Mental Health Status: Past Concern  Mental Health Management: Medication    Chemical Dependency Status:                Values/Beliefs:  Spiritual, Cultural Beliefs, Druze Practices, Values that affect care:                 Additional Information:  Buck lives in a house with his wife Neela. It is a \"2 story house. Bedroom is on the 2nd floor and I normally can use those steps ok.\"    He is independent with ADLs and wife helps with most IADLs. He and his wife drive. He uses a 4WW for mobility.     He has Home Oxygen from [employer] at 6-10LPM continuous. He and his wife \"want help looking into a better O2 plan because the portable tank isn't workable now that he has to be at higher oxygen levels. We want an answer about what it would mean to switch from the concentrator oxygen to a tank of oxygen. We have been told before that because of his high level of need he would have to switch, but we want to understand if all of his oxygen and portable has to switch and how often we would have to replace the tanks.\" It is after hours at this time for me to be able to contact [employer] or a different oxygen company.     Unknown discharge needs at this time.    Wife to transport at discharge.    CM to follow for medical progression of care, discharge recommendations, and final discharge plan.    Aretha Michael RN    "

## 2024-03-09 NOTE — Clinical Note
Called to Jonathan/Shahram RN on 4C. All questions answered. All belongings sent with patient. Patient transported to 4C via stretcher with CCL RN x 2 and RT on all monitoring equipment.

## 2024-03-09 NOTE — CONSULTS
[facility]  Cardiac Electrophysiology  [address]  [place] [address]   Office: [phone]  Fax: [phone]     Cardiac Electrophysiology Consultation    Patient: Buck Sinclair   : 1945     Referring Provider: No ref. provider found    CHIEF COMPLAINT/REASON FOR CONSULTATION  Dual chamber ICD in-situ  Chronic diastolic heart failure  Permanent atrial fibrillation  CAD with prior PCI    Assessment/Recommendations   Buck Sinclair is a 79 year old male with dual chamber ICD in-situ, chronic systolic and diastolic heart failure, permanent atrial fibrillation, CAD with prior PCI, HTN, CKD, severe COPD on 6-8L home oxygen admitted with CHRISSY on CKD.    Chronic diastolic heart failure - euvolemic to dry.  NT-proBNP elevated, though lower than prior.  LVEF 50% previously, non-ischemic.  - follow up TTE  - hold bumetanide 4mg twice daily and metolazone 5mg daily - likely will resume bumetanide alone when CHRISSY on CKD improves    CHRISSY on CKD - baseline Cr ~1.6    Permanent atrial fibrillation  - continue metoprolol XL 25mg daily  - continue warfarin    Dual chamber ICD in-situ - programmed VVIR    CAD - LAD PCI/CANDELARIA 2017  - continue aspirin, statin    Severe COPD        Clinically Significant Risk Factors Present on Admission        # Hypokalemia: Lowest K = 2.2 mmol/L in last 2 days, will replace as needed        # Drug Induced Coagulation Defect: home medication list includes an anticoagulant medication  # Drug Induced Platelet Defect: home medication list includes an antiplatelet medication   # Hypertension: Noted on problem list  # Acute heart failure with preserved ejection fraction: heart failure noted on problem list, last echo with EF >50%, and receiving IV diuretics  # Non-Invasive mechanical ventilation: current O2 Device: (S) High Flow Nasal Cannula (HFNC)  # Acute hypoxic respiratory failure: continue supplemental O2 as needed    # DMII: A1C = N/A  "within past 6 months    # Overweight: Estimated body mass index is 26.8 kg/m  as calculated from the following:    Height as of this encounter: 1.905 m (6' 3\").    Weight as of this encounter: 97.3 kg (214 lb 6.4 oz).       # COPD: noted on problem list  # ICD device present      Cardiac Arrhythmia: Atrial fibrillation: Permanent  Combined chronic      Hypokalemia and Dehydration    Not present on admission    Not present on admission    Acute kidney failure, unspecified    Not present on admission    COPD    Pulmonary Heart Disease (Pulmonary hypertension or Cor pulmonale): Pulmonary Hypertension, unspecified    Chronic Fatigue and Other Debilities: Chronic fatigue, unspecified  Limitation of activities due to disability               History of Present Illness   Buck Sinclair is a 79 year old male with dual chamber ICD in-situ, chronic systolic and diastolic heart failure, permanent atrial fibrillation, CAD with prior PCI, HTN, CKD, severe COPD on 6-8L home oxygen admitted with CHRISSY on CKD.    He reports baseline dyspnea with exertion and 6-8L home oxygen.  He had labs performed and was instructed to be admitted for CHRISSY. He denies recent syncope.       Physical Examination  Review of Systems   VITALS: BP 95/52 (BP Location: Left arm)   Pulse 84   Temp 99.1  F (37.3  C) (Oral)   Resp 22   Ht 1.905 m (6' 3\")   Wt 97.3 kg (214 lb 6.4 oz)   SpO2 (!) 85%   BMI 26.80 kg/m    Wt Readings from Last 3 Encounters:   03/09/24 97.3 kg (214 lb 6.4 oz)   03/01/24 111.5 kg (245 lb 12.8 oz)   11/14/23 111.2 kg (245 lb 3.2 oz)       Intake/Output Summary (Last 24 hours) at 3/9/2024 1307  Last data filed at 3/9/2024 0511  Gross per 24 hour   Intake 1200 ml   Output 1150 ml   Net 50 ml     CONSTITUTIONAL: no distress  EYES:  Conjunctivae pink, sclerae clear.    E/N/T:  Oral mucosa pink, moist mucous membranes  RESPIRATORY:  Respiratory effort is normal  CARDIOVASCULAR:  normal S1 and S2  GASTROINTESTINAL:  Abdomen without " masses or tenderness  EXTREMITIES:  No clubbing or cyanosis.    MUSCULOSKELETAL:  Overall grossly normal muscle strength  SKIN:  Overall, skin warm and dry, no lesions.  NEURO/PSYCH:  Oriented x 3 with normal affect.   Constitutional:  No weight loss or loss of appetite    Eyes:  No difficulty with vision, no double vision, no dry eyes  ENT:  No sore throat, difficulty swallowing; changes in hearing or tinnitus  Cardiovascular: As detailed above  Respiratory:  No cough  Musculoskeletal  No joint pain, muscle aches  Neurologic:  No syncope, lightheadedness, fainting spells   Hematologic: No easy bruising, excessive bleeding tendency   Gastrointestinal:  No jaundice, abdominal pain or abdominal bloating  Genitourinary: No changes in urinary habits, no trouble urinating    Psychiatric: No anxiety or depression      Medical History  Surgical History   Past Medical History:   Diagnosis Date    Anemia     Asthma without status asthmaticus 05/05/2021    Atrial fibrillation (H)     BPH (benign prostatic hyperplasia)     Cardiomyopathy (H)     CKD (chronic kidney disease) stage 3, GFR 30-59 ml/min (H) 05/24/2023    Congestive heart failure (H)     COPD, group B, by GOLD 2017 classification (H)     Coronary artery disease due to calcified coronary lesion     Dyslipidemia, goal LDL below 70     Essential hypertension     Heart failure with reduced ejection fraction (H) 08/17/2023    Hemoptysis 10/04/2017    History of transfusion     Hyperlipidemia     Persistent atrial fibrillation (H)     Pneumonia of left lower lobe due to infectious organism 10/04/2017    Pulmonary hypertension (H)     Skin cancer of trunk     Status post catheter ablation of atrial fibrillation 06/07/2017    PVI 4-2011 (Cryo/PVI + roof line + CTI line) Re-do PVI 7-2011 (RFA/PVI + CFE + VIDYA + confirmed CTI line)    Ventricular tachycardia (H)     Past Surgical History:   Procedure Laterality Date    CARDIAC DEFIBRILLATOR PLACEMENT      CARDIOVERSION   07/11/2018    x20, last 2/12/15, 10/2015, 11/18/16, 6/16/17 by Lauren Foster CNP    CARDIOVERSION  07/11/2018    CARDIOVERSION  11/19/2021    COLONOSCOPY N/A 04/28/2017    Procedure: COLONOSCOPY with 2 ascending polyps and 1 transverse polyp;  Surgeon: Jose Whittington MD;  Location: [facility] GI;  Service:     CORONARY ANGIOGRAPHY ADULT ORDER      CV CORONARY ANGIOGRAM N/A 09/20/2017    Procedure: Coronary Angiogram;  Surgeon: Sergio Cervantes MD;  Location: [facility] Cath Lab;  Service:     CV CORONARY ANGIOGRAM N/A 01/24/2022    Procedure: Coronary Angiogram;  Surgeon: Christi Saunders MD;  Location: [facility] CATH LAB CV    CV LEFT HEART CATH N/A 01/24/2022    Procedure: Left Heart Cath;  Surgeon: Christi Saunders MD;  Location: [facility] CATH LAB CV    CV RIGHT AND LEFT HEART CATH N/A 01/24/2022    Procedure: Right and Left Heart Catherization;  Surgeon: Christi Saunders MD;  Location: [facility] LAB CV    EP ICD GENERATOR REPLACEMENT DUAL N/A 10/28/2022    Procedure: Implantable Cardioverter Defibrillator Generator Replacement Dual;  Surgeon: Elo Collins MD;  Location: [facility] LAB CV    EP ICD INSERT      FRACTURE SURGERY Left     wrist    HEART CATH, ANGIOPLASTY      IMPLANT AUTOMATIC IMPLANTABLE CARDIOVERTER DEFIBRILLATOR      INGUINAL HERNIA REPAIR Left 1967    while in the [employer] in [place] after 13 month in [place]    INSERT / REPLACE / REMOVE PACEMAKER      IR MISCELLANEOUS PROCEDURE  04/30/2014    OTHER SURGICAL HISTORY      left hand surgery---tendon repair    [place] ABLATE HEART DYSRHYTHM FOCUS  04/2011    Catheter Ablation Atrial Fibrillation PVI Apr 2011 (Cryo+RF-PVI + roof line + CTI line)    [place] ABLATE HEART DYSRHYTHM FOCUS  07/2011    Re-do PVI Jul 2011 (RFA-PVI + CFE + VIDYA + confirmation of CTI line)    TOTAL SHOULDER REPLACEMENT Right 03/03/2016    Dr. Abernathy of [facility]    WRIST SURGERY Left     ZZC MYERS W/O FACETEC FORAMOT/DSKC 1/2 VRT SEG, CERVICAL       Laminectomy Lumbar;  Recorded: 2012;         Family History Social History   Family History   Problem Relation Age of Onset    Cancer Mother         leukemia    Cancer Father         bladder    Cancer Sister         breast with lung met.    Aneurysm Sister     CABG Brother     CABG Brother     Valvular heart disease Brother         valve replacement        Social History     Tobacco Use    Smoking status: Former     Packs/day: 1.00     Years: 4.00     Additional pack years: 0.00     Total pack years: 4.00     Types: Cigarettes     Quit date: 1968     Years since quittin.2    Smokeless tobacco: Never   Vaping Use    Vaping Use: Never used   Substance Use Topics    Alcohol use: Yes     Alcohol/week: 2.0 standard drinks of alcohol     Comment: Alcoholic Drinks/day: 1 beer per week    Drug use: No         Medications  Allergies     Current Facility-Administered Medications:     acetaminophen (TYLENOL) tablet 650 mg, 650 mg, Oral, BID PRN, Tawanna Martin MD    albuterol (PROVENTIL HFA/VENTOLIN HFA) inhaler, 2 puff, Inhalation, Q4H PRN, Tawanna Martin MD    aspirin EC tablet 81 mg, 81 mg, Oral, Daily, Tawanna Martin MD, 81 mg at 24 0817    [Held by provider] atorvastatin (LIPITOR) tablet 40 mg, 40 mg, Oral, QPM, Tawanna Martin MD    calcium carbonate (TUMS) chewable tablet 1,000 mg, 1,000 mg, Oral, 4x Daily PRN, Tawanna Martin MD    empagliflozin (JARDIANCE) tablet 10 mg, 10 mg, Oral, Daily, Tawanna Martin MD, 10 mg at 24 0941    FLUoxetine (PROzac) capsule 20 mg, 20 mg, Oral, Daily, Tawanna Martin MD, 20 mg at 24 0817    fluticasone-vilanterol (BREO ELLIPTA) 200-25 MCG/ACT inhaler 1 puff, 1 puff, Inhalation, QPM, Tawanna Martin MD, 1 puff at 24    lidocaine (LMX4) cream, , Topical, Q1H PRN, Tawanna Martin MD    lidocaine 1 % 0.1-1 mL, 0.1-1 mL, Other, Q1H PRN, Tawanna Martin MD    metoprolol  succinate ER (TOPROL XL) 24 hr tablet 25 mg, 25 mg, Oral, Daily, Tawanna Martin MD, 25 mg at 03/09/24 0818    nitroGLYcerin (NITROSTAT) sublingual tablet 0.4 mg, 0.4 mg, Sublingual, Q5 Min PRN, Tawanna Martin MD    ondansetron (ZOFRAN ODT) ODT tab 4 mg, 4 mg, Oral, Q6H PRN **OR** ondansetron (ZOFRAN) injection 4 mg, 4 mg, Intravenous, Q6H PRN, Tawanna Martin MD    Patient is already receiving anticoagulation with heparin, enoxaparin (LOVENOX), warfarin (COUMADIN)  or other anticoagulant medication, , Does not apply, Continuous PRN, Tawanna Martin MD    polyethylene glycol (MIRALAX) Packet 17 g, 17 g, Oral, Daily, Tawanna Martin MD, 17 g at 03/09/24 0818    prochlorperazine (COMPAZINE) injection 5 mg, 5 mg, Intravenous, Q6H PRN **OR** prochlorperazine (COMPAZINE) tablet 5 mg, 5 mg, Oral, Q6H PRN **OR** prochlorperazine (COMPAZINE) suppository 12.5 mg, 12.5 mg, Rectal, Q12H PRN, Tawanna Martin MD    senna-docusate (SENOKOT-S/PERICOLACE) 8.6-50 MG per tablet 1 tablet, 1 tablet, Oral, BID PRN **OR** senna-docusate (SENOKOT-S/PERICOLACE) 8.6-50 MG per tablet 2 tablet, 2 tablet, Oral, BID PRN, Tawanna Martin MD    sodium chloride (PF) 0.9% PF flush 3 mL, 3 mL, Intracatheter, Q8H, Tawanna Martin MD, 3 mL at 03/09/24 1022    sodium chloride (PF) 0.9% PF flush 3 mL, 3 mL, Intracatheter, q1 min prn, Tawanna Martin MD    umeclidinium (INCRUSE ELLIPTA) 62.5 MCG/ACT inhaler 1 puff, 1 puff, Inhalation, Daily, Tawanna Martin MD, 1 puff at 03/09/24 0939    warfarin ANTICOAGULANT (COUMADIN) tablet 2.5 mg, 2.5 mg, Oral, ONCE at 18:00, Viky Reddy MD    Warfarin Dose Required Daily - Pharmacist Managed, 1 each, Oral, See Admin Instructions, Tawanna Martin MD     Allergies   Allergen Reactions    Adhesive Tape Other (See Comments)     ADHESIVE TAPE; SKIN IRRITATION; Skin pulled off with foam tape      Amiodarone      ADVERSE REACTION.   "Sunlight sensitivity.    Lisinopril           Lab Results    Chemistry CBC Cardiac Enzymes/BNP/TSH/INR   Recent Labs   Lab Test 03/09/24  0510      POTASSIUM 2.7*   CHLORIDE 94*   CO2 33*   *   BUN 66.5*   CR 2.70*   GFRESTIMATED 23*   AMBIKA 9.4     Recent Labs   Lab Test 03/09/24  0510 03/08/24  1334 03/08/24  1201   CR 2.70* 3.03* 3.05*          Recent Labs   Lab Test 03/08/24  1334   WBC 8.3   HGB 13.2*   HCT 42.3           Recent Labs   Lab Test 03/08/24  1334 06/01/23  0539 05/25/23  0444   HGB 13.2* 11.7* 12.2*    No results for input(s): \"TROPONINI\" in the last 73480 hours.  Recent Labs   Lab Test 03/09/24  0510 03/08/24  1334 05/25/23  0444 05/18/23  0855 10/05/22  1002 05/17/22  1149 02/15/22  0941 02/09/22  1032 12/01/20  0958   BNP  --   --   --   --   --   --   --   --  265*   NTBNPI 4,741* 6,204* 11,084*   < >  --   --   --   --   --    NTBNP  --   --   --   --  3,598* 2,976* 2,870*   < >  --     < > = values in this interval not displayed.     Recent Labs   Lab Test 05/18/23  0855   TSH 2.38     Recent Labs   Lab Test 03/09/24  0510 03/08/24  1340 03/05/24  0000   INR 2.82* 2.94* 2.7         Data Review    ECGs (all tracings independently reviewed)  3/8/2024 - AF, ventricular rate 73bpm, intermittent ventricular pacing    5/21/2023 TTE  1. The left ventricle is normal in size. Left ventricular function is  normal.The ejection fraction is 55-60%.  2. The right ventricle is mildly dilated.Moderately decreased right  ventricular systolic function  3. The left atrium is moderate to severely dilated. The right atrium is  severely dilated.  4. A contrast injection (Bubble Study) was performed that was negative for  flow across the interatrial septum. There is no color Doppler evidence of an  atrial shunt.  Limited views were obtained.           Jillian Prince MD  3/9/2024  1:07 PM    "

## 2024-03-09 NOTE — PROGRESS NOTES
"Care Management Follow Up    Length of Stay (days): 1    Expected Discharge Date: 03/12/2024     Concerns to be Addressed:  pulm status- increased O2 needs not at baseline, monitoring labs,  cardiology consult, PT/OT eval and recs    Patient plan of care discussed at interdisciplinary rounds: Yes    Anticipated Discharge Disposition:  TBD     Anticipated Discharge Services:  TBD    Anticipated Discharge DME:  TBD       Additional Information:  Patient presented with weakness/fatigue and noted hypotension.  Cardiology consulted.      Therapy eval and recs pending.        Social History:  Per initial CM assessment, Buck lives in a house with his wife Neela. It is a \"2 story house. Bedroom is on the 2nd floor and I normally can use those steps ok.\"  He is independent with ADLs and wife helps with most IADLs. He and his wife drive. He uses a 4WW for mobility.      He has Home Oxygen from [employer] at 6-10LPM continuous. He and his wife \"want help looking into a better O2 plan because the portable tank isn't workable now that he has to be at higher oxygen levels. We want an answer about what it would mean to switch from the concentrator oxygen to a tank of oxygen. We have been told before that because of his high level of need he would have to switch, but we want to understand if all of his oxygen and portable has to switch and how often we would have to replace the tanks.\" It is after hours at this time for me to be able to contact [employer] or a different oxygen company.  Wife is primary contact and willing to transport at discharge.      3/9/24:  Final discharge plan pending progression and recommendations.        Janice Dukes RN      "

## 2024-03-09 NOTE — PROGRESS NOTES
Update received. Will order CXR and BNP. Have resident evaluate at beside with concerns or worsening.

## 2024-03-09 NOTE — PLAN OF CARE
"STATUS: acute on chronic resp failure, dehydration, CHRISSY  NEURO: A/Ox4  VS: stable, soft BP's  ACTIVITY: SBA  PAIN: no reports of pain  CARDIAC: on tele, afib, intermittently paced  RESP: High Flow Nasal Cannula, 72% 50LPM  GI/: 2gm Na, low fat, no caffeine diet, voiding in urinal  SKIN: bruising and poor skin integrity throughout  LDA:PIV R arm    /66 (BP Location: Right arm)   Pulse 82   Temp 98.6  F (37  C) (Oral)   Resp 20   Ht 1.905 m (6' 3\")   Wt 97.3 kg (214 lb 6.4 oz)   SpO2 92%   BMI 26.80 kg/m            "

## 2024-03-09 NOTE — PROGRESS NOTES
Pt on [facility] and needed to go to CT, placed on a NRB but desatted to 83% with movement.  Pt was subsequently put on a bipap for transport.  Pt tolerated well, sats > 90% for duration of CT trip.  Pt transferred back to [facility] 45L, 70% upon return to room.    Marina Ruiz, RT on 3/9/2024 at 12:35 PM

## 2024-03-09 NOTE — CONSULTS
INITIAL PULMONARY   3/9/2024      Admit Date: 3/8/2024  Hospital Day: 1   CODE: Full Code    Reason for Consult: Acute on chronic hypoxemic respiratory failure      Assessment/Plan:   Buck Sinclair is a 79 year old male with a past medical history significant for anemia, asthma, BPH, cardiomyopathy, P. HTN, CKD, COPD, CAD, dyslipidemia, A-Fib, PVI and prior VT admitted for worsening hypoxemic respiratory failure.    Recommend:  Acute on chronic hypoxemic respiratory failure: Is noted to have Gold E COPD, it is noted that he has a very brief history of tobacco abuse and much of his inhalational exposure has been due to his work as a .  Historically his initial pulmonary function testing had demonstrated mild obstruction and normal diffusion capacity in 2017, however, in 2019 he was noted to have hypoxia on exertion and initiated supplemental oxygen in 2020.  Has been noted to have escalating oxygen requirements and is on significant supplemental oxygen at home.  Maintain oxygen saturations greater than 88%.  Alternate between high flow nasal cannula and level ventilation to achieve this.  Is on an ICS/LABA/LAMA at baseline, continue fluticasone/vilanterol and Umeclidinium daily.  As needed albuterol for rescue.  P. HTN: He was noted to have exertional dyspnea and has had significant decline in his diffusion capacity starting in 2020 with eri of 34.  He has undergone a right heart cath in January 2022 which revealed an mPAP of 37 mmHg, PCWP 14, CO 5.7 L/min, TPG of 23, DPG 10 and PVR of 4.1 Woods units. It is likely that he has a combination of P.HTN Group 1 and 2. Do not think that his obstructive disease is contributing significantly to his hypoxia; additionally he has had a negative bubble study and VQ scan.  He was recently seen in the pulmonary clinic where it was noted that he was in heart failure with a worsening lower extremity edema and increasing oxygen requirements.  For these concerns,  his diuretic regimen was increased in the pulmonary clinic as the patient had refused to be hospitalized.  Outpatient diuretics on hold due to worsening renal function.  Evaluated by EP for management of his A-fib which is under control.  I had a long discussion with Dr. Yañez at [facility] about the potential to transfer him for a RHC with a vasodilator challenge. Have spoken to [facility] and we are working on arranging for a bed to transfer him.  Acute on chronic renal failure: Diuretic regimen being held due to hypokalemia and worsening renal function.  Continue to monitor I/O, renal function and electrolytes.  Goals of Care: I had a very long discussion with the patient and his spouse about goals of care and how they felt about extraordinary measures for life sustaining treatment. Both of them agree that he would like to do everything short of intubation and chest compressions. To this end I will make him DNR.    Alysha Beckwith MD  Pulmonary and Critical Care  (P) [phone]      HPI:   CCx:Acute on chronic hypoxemic respiratory failure    HPI:Mr. Sinclair is a 79 year old gentleman with a past medical history significant for anemia, asthma, BPH, cardiomyopathy, P. HTN, CKD, COPD, CAD, dyslipidemia, A-Fib, PVI and prior VT admitted for worsening hypoxemic respiratory failure who presented to the emergency department yesterday.  He had recently been evaluated in the pulmonary clinic on 3/1/2024 when it was noted that he was demonstrating heart failure exacerbation but was not agreeable to inpatient hospitalization.  His diuretic regimen has been increased on an outpatient basis and he was due to follow-up with close labs.  Upon review of his BMP a week into his aggressive diuretic regimen, he was noted to have significant worsening of his renal failure and was asked to come to the hospital for an admission and management.  On presentation to the hospital, he was noted to be very hypoxic (requires 6-8  L supplemental oxygen at baseline), with significant elevation in his creatinine accompanied by hypokalemia, and elevated NT proBNP    ROS: Pertinent positives alluded to in the HPI. Remainder of 10 point ROS is negative.                                                                                                                                                       Medical/Surgical history:  Reviewed in epic.    Allergies:  Reviewed in Epic    PTA medications:  Medications Prior to Admission   Medication Sig Dispense Refill Last Dose    acetaminophen (TYLENOL) 325 MG tablet Take 650 mg by mouth 2 times daily as needed   Past Month    albuterol (PROAIR HFA/PROVENTIL HFA/VENTOLIN HFA) 108 (90 Base) MCG/ACT inhaler INHALE 2 PUFFS INTO THE LUNGS EVERY 4 HOURS AS NEEDED FOR SHORTNESS OF BREATH OR WHEEZING 18 g 2 3/8/2024 at AM    Ascorbic Acid (VITAMIN C) 500 MG CAPS Take 1,000 mg by mouth daily   3/8/2024 at AM    aspirin (ASA) 81 MG EC tablet Take 1 tablet (81 mg) by mouth daily 90 tablet 0 3/8/2024 at AM    atorvastatin (LIPITOR) 40 MG tablet Take 40 mg by mouth every evening   3/7/2024 at PM    budesonide-formoterol (SYMBICORT) 160-4.5 MCG/ACT Inhaler Inhale 2 puffs into the lungs 2 times daily 10.2 g 11 3/8/2024 at AM    bumetanide (BUMEX) 2 MG tablet Take 1 tablet (2 mg) by mouth 2 times daily (Patient taking differently: Take 2 mg by mouth 2 times daily Increase dose to 4mg twice daily starting 3/1 or as instructed by cardiology) 180 tablet 3 3/8/2024 at AM-4mg    co-enzyme Q-10 100 MG CAPS capsule Take 2 capsules (200 mg) by mouth daily 2 capsule  3/8/2024 at AM    empagliflozin (JARDIANCE) 10 MG TABS tablet Take 1 tablet (10 mg) by mouth daily 90 tablet 3 3/8/2024 at MA    fish oil-omega-3 fatty acids 1000 MG capsule Take 1 g by mouth 2 times daily   3/8/2024 at AM    FLUoxetine (PROZAC) 20 MG capsule TAKE 1 CAPSULE BY MOUTH EVERY DAY   3/8/2024 at AM    MAG64 64 MG TBEC CR tablet TAKE 2 TABLETS BY MOUTH  "EVERY  tablet 1 3/8/2024 at MA    metolazone (ZAROXOLYN) 5 MG tablet Take 1 tablet (5 mg) by mouth daily 10 tablet 0 3/4/2024    metoprolol succinate ER (TOPROL XL) 25 MG 24 hr tablet Take 1 tablet (25 mg) by mouth daily 30 tablet 11 3/8/2024 at AM    multivitamin, therapeutic (THERA-VIT) TABS tablet Take 1 tablet by mouth daily   3/8/2024 at AM    polyethylene glycol (MIRALAX) 17 GM/Dose powder Take 17 g by mouth daily    3/8/2024 at AM    potassium chloride ER (K-TAB) 20 MEQ CR tablet Take 4 tablets (80 mEq) by mouth daily (Patient taking differently: Take 80 mEq by mouth once) 60 tablet 0 3/4/2024    sodium chloride (OCEAN) 0.65 % nasal spray Spray 2 sprays in nostril 4 times daily as needed   Past Week    tiotropium (SPIRIVA) 18 MCG inhaled capsule Inhale 1 capsule (18 mcg) into the lungs daily 30 capsule 11 3/8/2024 at AM    vitamin D2 (ERGOCALCIFEROL) 83488 units (1250 mcg) capsule Take 50,000 Units by mouth twice a week On Sunday and Wednesday   3/6/2024    warfarin ANTICOAGULANT (COUMADIN) 2.5 MG tablet Take 1 tablet (2.5 mg) daily or as directed by the INR clinic 110 tablet 0 3/7/2024 at PM    nitroGLYcerin (NITROSTAT) 0.4 MG sublingual tablet One tablet under the tongue every 5 minutes if needed for chest pain. May repeat every 5 minutes for a maximum of 3 doses in 15 minutes\" 25 tablet 3     OXYGEN-HELIUM IN 4-5 L           Family Hx:  Reviewed in epic.    Social Hx:  Reviewed in epic.    Exam/Data:   ROS: 10 point ROS obtained, pertinant positives alluded to in HPI    Vitals  BP 95/52 (BP Location: Left arm)   Pulse 84   Temp 99.1  F (37.3  C) (Oral)   Resp 22   Ht 1.905 m (6' 3\")   Wt 97.3 kg (214 lb 6.4 oz)   SpO2 (!) 85%   BMI 26.80 kg/m    BP - Mean:  [83-88] 88  I/O last 3 completed shifts:  In: 1200 [P.O.:600; IV Piggyback:600]  Out: 1150 [Urine:1150]  Weight change:   EXAM:  Physical Exam  HENT:      Head: Normocephalic.      Mouth/Throat:      Mouth: Mucous membranes are moist. "   Cardiovascular:      Rate and Rhythm: Regular rhythm. Tachycardia present.      Heart sounds: Normal heart sounds.   Pulmonary:      Effort: Pulmonary effort is normal.      Breath sounds: Normal breath sounds.   Abdominal:      General: Abdomen is flat.   Skin:     General: Skin is warm.   Neurological:      General: No focal deficit present.      Mental Status: He is alert and oriented to person, place, and time.           Medications:      - MEDICATION INSTRUCTIONS -        aspirin  81 mg Oral Daily    [Held by provider] atorvastatin  40 mg Oral QPM    empagliflozin  10 mg Oral Daily    FLUoxetine  20 mg Oral Daily    fluticasone-vilanterol  1 puff Inhalation QPM    metoprolol succinate ER  25 mg Oral Daily    polyethylene glycol  17 g Oral Daily    sodium chloride (PF)  3 mL Intracatheter Q8H    umeclidinium  1 puff Inhalation Daily    warfarin ANTICOAGULANT  2.5 mg Oral ONCE at 18:00    Warfarin Therapy Reminder  1 each Oral See Admin Instructions         DATA  All laboratory and radiology has been personally reviewed by myself today.  Recent Labs   Lab 03/08/24  1334   WBC 8.3   HGB 13.2*   HCT 42.3        Recent Labs   Lab 03/09/24  0510 03/08/24  1334 03/08/24  1201    137 137   CO2 33* 33* 38*   BUN 66.5* 65.3* 67.5*     PFT DATA 10/27/22:  FEV1/FVC is 61% and is reduced.  FEV1 is 1.98L (57%) predicted and is reduced.  FVC is 3.23L (68%) predicted and reduced.  TLC is 6.26L (76%) predicted and is reduced.  RV is 2.9L (97%) predicted and is normal.  DLCO is 10.07ml/min/hg (34%) predicted and is reduced when it is not corrected for hemoglobin.  Flow volume loops indicate scooping of the expiratory limb.    Impression:  Full Pulmonary Function Test is abnormal.  PFTs are consistent with moderate-severe obstructive disease.  There is no hyperinflation.  There is no air-trapping.  Diffusion capacity when corrected for hemoglobin is severely reduced.      IMAGING:   VQ Scan 5/25/23:  Low  probability of pulmonary embolism.     TTE 5/18/23:  1. The left ventricle is normal in size. Left ventricular function is normal.The ejection fraction is 55-60%.  2. The right ventricle is mildly dilated.Moderately decreased right ventricular systolic function  3. The left atrium is moderate to severely dilated. The right atrium is severely dilated.  4. A contrast injection (Bubble Study) was performed that was negative for flow across the interatrial septum. There is no color Doppler evidence of an atrial shunt.     Limited views were obtained.  ________________________________________________________________________  Left Ventricle  The left ventricle is normal in size. Left ventricular function is normal. The ejection fraction is 55-60%.     Right Ventricle  The right ventricle is mildly dilated. Moderately decreased right ventricular systolic function. There is a pacemaker lead in the right ventricle.     Atria  The left atrium is moderate to severely dilated. The right atrium is severely dilated. A contrast injection (Bubble Study) was performed that was negative for flow across the interatrial septum. There is no color Doppler evidence of an atrial shunt.     Mitral Valve  There is mild mitral annular calcification. The mitral valve leaflets are mildly thickened.     Pericardium  There is no pericardial effusion.     RHC 1/24/22:  Exertional dyspnea in a patient with known CAD and severe COPD.  Mild coronary artery disease with patent LAD stents.  LVEDP and PCWP are normal. The PA pressure was elevated at 66/24 mmHg with a mean pressure of 37 mmHg. See numbers below.  Shikha and TD cardiac outputs were both 5.7 l/min.  Sats on 2L NC oxygen: Ao 93%, PA 60%, RA 59%        Alysha Bartlettqvi  Pulm/CC  [id number]

## 2024-03-09 NOTE — PLAN OF CARE
Problem: Gas Exchange Impaired  Goal: Optimal Gas Exchange  Outcome: Progressing  Intervention: Optimize Oxygenation and Ventilation  Recent Flowsheet Documentation  Taken 3/8/2024 2011 by Madelyn May, RN  Head of Bed (HOB) Positioning: HOB at 20-30 degrees     Problem: Comorbidity Management  Goal: Maintenance of Heart Failure Symptom Control  Outcome: Progressing  Intervention: Maintain Heart Failure Management  Recent Flowsheet Documentation  Taken 3/8/2024 2011 by Madelyn May, RN  Medication Review/Management: medications reviewed   Goal Outcome Evaluation:    Pt came from ER on Hiflo[place] 10L, increased to 12L due to sating 86%. Receiving 500cc IV bolus gently. K+ 2.2, critical lab resulted now. Will pg MD, on K+ protocol.  after voiding 250 at this time.

## 2024-03-09 NOTE — Clinical Note
dry, intact, no bleeding and no hematoma. 7 Fr sheath removed from the right internal jugular vein. Manual pressure held. Hemostasis obtained. Primapore applied.

## 2024-03-09 NOTE — CONSULTS
"Cruz Yañez M.D.  Cardiovascular Medicine    I personally reviewed the patient's outside chart, imaging (where available), laboratories, medications.      90 minutes    Problem List  Acute superimposed upon chronic respiratory with low DLCO  Acute and chronic kidney disease  Previously elevated body mass  History of ASHD with stendting  Chronic atrial fibrillation  Chronic anticoagulation  History of SGLT2 usage  Depression  Hyperlipidemia  Pacemaker  Hypokalemia    Plan  Transfer to ICU  Right heart catheterization and hemodynamic monitoring to assess right sided cardiac function and PA response in order to determine PA vasodilator response to prostacyclin and need for and titration of inotropic agents and/or vasodilators  Discussed with Dae Toure at     Discussed with admitting, Cards 2 to arrange transfer    History  79 year old retired  with recent (years ) history of evolving deterioration in DLCO with what was described as modest COLD and oxygen dependence.  Presented to  with evidence of right heart failure and deteriorating renal function.  Echo today (see below).  History of chronic atrial fibrillation, warfarin, ASHD with stent, dual chamber pacing.  I could not review most current ECG to assess for rate and pacing configuration.  Previous VQ negative for PE.  Does have occupational exposure.  Has been felt to have diastolic dysfunction but previous hemodynamics borderline.      Objective  /66 (BP Location: Right arm)   Pulse 82   Temp 98.6  F (37  C) (Oral)   Resp 20   Ht 1.905 m (6' 3\")   Wt 97.3 kg (214 lb 6.4 oz)   SpO2 92%   BMI 26.80 kg/m      Intake/Output Summary (Last 24 hours) at 3/9/2024 1526  Last data filed at 3/9/2024 1300  Gross per 24 hour   Intake 700 ml   Output 1400 ml   Net -700 ml     Wt Readings from Last 24 Encounters:   03/09/24 97.3 kg (214 lb 6.4 oz)   03/01/24 111.5 kg (245 lb 12.8 oz)   11/14/23 111.2 kg (245 lb 3.2 oz)   09/28/23 112.5 kg (248 lb) "   08/17/23 115.2 kg (254 lb)   06/15/23 115.8 kg (255 lb 3.2 oz)   06/13/23 114.8 kg (253 lb)   06/01/23 110.1 kg (242 lb 11.2 oz)   11/16/22 123.3 kg (271 lb 14.4 oz)   11/08/22 118.8 kg (262 lb)   10/28/22 118.8 kg (262 lb)   05/17/22 126.6 kg (279 lb)   05/06/22 127.6 kg (281 lb 4.8 oz)   03/15/22 123.7 kg (272 lb 9.6 oz)   03/01/22 122.2 kg (269 lb 6.4 oz)   02/18/22 121.1 kg (267 lb)   02/15/22 123.1 kg (271 lb 6.4 oz)   02/10/22 123.2 kg (271 lb 9.6 oz)   01/27/22 123.4 kg (272 lb)   01/24/22 115.9 kg (255 lb 9.6 oz)   01/14/22 122.9 kg (271 lb)   01/06/22 124.7 kg (275 lb)   11/23/21 123.4 kg (272 lb)   11/19/21 122.2 kg (269 lb 8 oz)        Meds   aspirin  81 mg Oral Daily    [Held by provider] atorvastatin  40 mg Oral QPM    empagliflozin  10 mg Oral Daily    FLUoxetine  20 mg Oral Daily    fluticasone-vilanterol  1 puff Inhalation QPM    metoprolol succinate ER  25 mg Oral Daily    polyethylene glycol  17 g Oral Daily    potassium chloride  40 mEq Oral Once    sodium chloride (PF)  3 mL Intracatheter Q8H    umeclidinium  1 puff Inhalation Daily    warfarin ANTICOAGULANT  2.5 mg Oral ONCE at 18:00    Warfarin Therapy Reminder  1 each Oral See Admin Instructions       Labs  CMP  Recent Labs   Lab 03/09/24  1358 03/09/24  0510 03/08/24  2220 03/08/24  1334 03/08/24  1201 03/05/24  1136   NA  --  139  --  137 137 143   POTASSIUM 3.2* 2.7* 2.2* 2.3* 3.0* 3.9   CHLORIDE  --  94*  --  88* 86* 93*   CO2  --  33*  --  33* 38* 40*   ANIONGAP  --  12  --  16* 13 10   GLC  --  116*  --  141* 169* 137*   BUN  --  66.5*  --  65.3* 67.5* 48.2*   CR  --  2.70*  --  3.03* 3.05* 2.52*   GFRESTIMATED  --  23*  --  20* 20* 25*   AMBIKA  --  9.4  --  9.9 9.9 10.5*   MAG  --  2.1  --  2.5*  --   --      CBC  Recent Labs   Lab 03/08/24  1334   WBC 8.3   RBC 4.24*   HGB 13.2*   HCT 42.3      MCH 31.1   MCHC 31.2*   RDW 15.8*        INR  Recent Labs   Lab 03/09/24  0510 03/08/24  1340 03/05/24  0000   INR 2.82* 2.94*  2.7     Arterial Blood Gas  Recent Labs   Lab 24  0924 24  0914 24  1349   PH  --  7.51*  --    PCO2  --  42  --    PO2  --  62*  --    HCO3  --  33*  --    O2PER 30 80 40     Lab   Latest Reference Range & Units 24 05:10   Sodium 135 - 145 mmol/L 139   Potassium 3.4 - 5.3 mmol/L 2.7 (L)   Chloride 98 - 107 mmol/L 94 (L)   Carbon Dioxide (CO2) 22 - 29 mmol/L 33 (H)   Urea Nitrogen 8.0 - 23.0 mg/dL 66.5 (H)   Creatinine 0.67 - 1.17 mg/dL 2.70 (H)   GFR Estimate >60 mL/min/1.73m2 23 (L)   Calcium 8.8 - 10.2 mg/dL 9.4   Anion Gap 7 - 15 mmol/L 12   Magnesium 1.7 - 2.3 mg/dL 2.1   Glucose 70 - 99 mg/dL 116 (H)   N-Terminal Pro BNP Inpatient 0 - 1,800 pg/mL 4,741 (H)     Imaging:  EXAM: XR CHEST PORT 1 VIEW  LOCATION: [facility]  DATE: 3/8/2024     INDICATION: sob  COMPARISON: 2023                                                                      IMPRESSION: Right cardiac generator and leads are unchanged. Bibasilar scarring/atelectasis. No focal airspace opacity. No pleural effusion or pneumothorax. Unchanged cardiomegaly. Normal mediastinal contours.    Echocardiogram  Name: ALEJO BROWN  MRN: [id number]  : 1945  Study Date: 2024 11:07 AM  Age: 79 yrs  Gender: Male  Patient Location: JNP4  Reason For Study: CHF  Ordering Physician: ELIZABETH KITCHEN  Performed By: NOHEMI     BSA: 2.3 m2  Height: 75 in  Weight: 214 lb  HR: 84  BP: 95/52 mmHg  ______________________________________________________________________________  Procedure  Complete Portable Echo Adult. Definity (NDC #30960-909) given intravenously.  ______________________________________________________________________________  Interpretation Summary     Left ventricular size, wall motion and function are normal. The ejection  fraction is 55-60%.  Flattened septum is consistent with RV pressure/volume overload.  The right ventricle is mild to moderately dilated.  Mildly decreased  right ventricular systolic function  Right ventricular systolic pressure is elevated, consistent with moderate to  severe pulmonary hypertension.  There is moderate (2+) tricuspid regurgitation.  IVC diameter >2.1 cm collapsing <50% with sniff suggests a high RA pressure  estimated at 15 mmHg or greater.  Ascending Aorta dilatation is present.  ______________________________________________________________________________  Left Ventricle  Left ventricular size, wall motion and function are normal. The ejection  fraction is 55-60%. There is normal left ventricular wall thickness. Flattened  septum is consistent with RV pressure/volume overload.     Right Ventricle  The right ventricle is mild to moderately dilated. Mildly decreased right  ventricular systolic function. TAPSE is normal, which is consistent with  normal right ventricular systolic function. There is a pacemaker lead in the  right ventricle.     Atria  The left atrium is moderately dilated. The right atrium is severely dilated.  Pacer wire in right atrium.     Mitral Valve  There is mild to moderate mitral annular calcification. Mitral valve leaflets  appear normal. There is no evidence of mitral stenosis or clinically  significant mitral regurgitation.     Tricuspid Valve  Tricuspid valve leaflets appear normal. There is moderate (2+) tricuspid  regurgitation. Right ventricular systolic pressure is elevated, consistent  with moderate to severe pulmonary hypertension.     Aortic Valve  The aortic valve is trileaflet with aortic valve sclerosis. No hemodynamically  significant valvular aortic stenosis.     Pulmonic Valve  The pulmonic valve is not well seen, but is grossly normal.     Vessels  The aorta root is normal. Ascending Aorta dilatation is present. IVC diameter  >2.1 cm collapsing <50% with sniff suggests a high RA pressure estimated at 15  mmHg or greater.     Pericardium  There is no pericardial effusion.     Rhythm  The rhythm was atrial  fibrillation.  ______________________________________________________________________________  MMode/2D Measurements & Calculations     IVSd: 1.1 cm  LVIDd: 5.6 cm  LVIDs: 4.0 cm  LVPWd: 1.0 cm  FS: 28.7 %  LV mass(C)d: 238.1 grams  LV mass(C)dI: 105.4 grams/m2  Ao root diam: 3.0 cm  LA dimension: 5.0 cm  LA/Ao: 1.7  LVOT diam: 2.1 cm  LVOT area: 3.5 cm2  Ao root diam index Ht(cm/m): 1.6  Ao root diam index BSA (cm/m2): 1.3  RV Base: 4.8 cm  RWT: 0.37  TAPSE: 1.7 cm     Doppler Measurements & Calculations  MV E max goldy: 73.4 cm/sec  MV dec time: 0.23 sec  LV V1 max P.4 mmHg  LV V1 max: 58.3 cm/sec  LV V1 VTI: 9.5 cm     SV(LVOT): 33.0 ml  SI(LVOT): 14.6 ml/m2  PA acc time: 0.05 sec  TR max goldy: 372.0 cm/sec  TR max P.4 mmHg  RV S Goldy: 10.6 cm/sec

## 2024-03-09 NOTE — PLAN OF CARE
Problem: Gas Exchange Impaired  Goal: Optimal Gas Exchange  Outcome: Not Progressing   Goal Outcome Evaluation:    Pt using Hi Flow oxygen 13 L at beginning of shift and continuous oximetry monitoring. Needing increased oxygen as O2 sats dropped to low 80s. Called RT. Increased Hi Flow to 25 L at 60% humidity. Pt continued to run in low to mid 80s. RT increased O2 to 35 L at 70% humidity. Able to maintain O2 sats between 88-92%. Text paged hospitalist, Dr. Aguila with update. New orders placed for CXR and NT Pro BNP inpatient level.

## 2024-03-09 NOTE — PHARMACY-ANTICOAGULATION SERVICE
Clinical Pharmacy - Warfarin Dosing Consult     Pharmacy has been consulted to manage this patient s warfarin therapy.  Indication: Atrial Fibrillation  Therapy Goal: INR 2-3  Provider/Team: Hospitalist  Warfarin Prior to Admission: Yes  Warfarin PTA Regimen: 2.5mg daily  Significant drug interactions: aspirin, fluoxetine  Recent documented change in oral intake/nutrition: Unknown  Dose Comments: Will give a slightly lower dose of 2mg tonight since the INR is 2.94 and acutely ill    INR   Date Value Ref Range Status   03/08/2024 2.94 (H) 0.85 - 1.15 Final     INR HOME MONITORING   Date Value Ref Range Status   03/05/2024 2.7 2.000 - 3.000 Final       Recommend warfarin 2 mg today.  Pharmacy will monitor Buck Sinclair daily and order warfarin doses to achieve specified goal.      Please contact pharmacy as soon as possible if the warfarin needs to be held for a procedure or if the warfarin goals change.

## 2024-03-09 NOTE — PROGRESS NOTES
Cruz Yañez M.D.  Cardiovascular Medicine    I personally saw and examined this patient, discussed care with housestaff and other consultants, reviewed current laboratories and imaging studies, and conveyed impression and diagnostic/therapeutic plan to patient.    Problem List    History  No events overnight. Nursing notes reviewed.    Objective  There were no vitals taken for this visit.  [unfilled]  Wt Readings from Last 5 Encounters:   03/09/24 97.3 kg (214 lb 6.4 oz)   03/01/24 111.5 kg (245 lb 12.8 oz)   11/14/23 111.2 kg (245 lb 3.2 oz)   09/28/23 112.5 kg (248 lb)   08/17/23 115.2 kg (254 lb)     Wt Readings from Last 24 Encounters:   03/09/24 97.3 kg (214 lb 6.4 oz)   03/01/24 111.5 kg (245 lb 12.8 oz)   11/14/23 111.2 kg (245 lb 3.2 oz)   09/28/23 112.5 kg (248 lb)   08/17/23 115.2 kg (254 lb)   06/15/23 115.8 kg (255 lb 3.2 oz)   06/13/23 114.8 kg (253 lb)   06/01/23 110.1 kg (242 lb 11.2 oz)   11/16/22 123.3 kg (271 lb 14.4 oz)   11/08/22 118.8 kg (262 lb)   10/28/22 118.8 kg (262 lb)   05/17/22 126.6 kg (279 lb)   05/06/22 127.6 kg (281 lb 4.8 oz)   03/15/22 123.7 kg (272 lb 9.6 oz)   03/01/22 122.2 kg (269 lb 6.4 oz)   02/18/22 121.1 kg (267 lb)   02/15/22 123.1 kg (271 lb 6.4 oz)   02/10/22 123.2 kg (271 lb 9.6 oz)   01/27/22 123.4 kg (272 lb)   01/24/22 115.9 kg (255 lb 9.6 oz)   01/14/22 122.9 kg (271 lb)   01/06/22 124.7 kg (275 lb)   11/23/21 123.4 kg (272 lb)   11/19/21 122.2 kg (269 lb 8 oz)      Meds    No current facility-administered medications for this visit.     No current outpatient medications on file.     Facility-Administered Medications Ordered in Other Visits   Medication    acetaminophen (TYLENOL) tablet 650 mg    albuterol (PROVENTIL HFA/VENTOLIN HFA) inhaler    aspirin EC tablet 81 mg    [Held by provider] atorvastatin (LIPITOR) tablet 40 mg    calcium carbonate (TUMS) chewable tablet 1,000 mg    empagliflozin (JARDIANCE) tablet 10 mg    FLUoxetine (PROzac) capsule 20 mg     fluticasone-vilanterol (BREO ELLIPTA) 200-25 MCG/ACT inhaler 1 puff    lidocaine (LMX4) cream    lidocaine 1 % 0.1-1 mL    metoprolol succinate ER (TOPROL XL) 24 hr tablet 25 mg    nitroGLYcerin (NITROSTAT) sublingual tablet 0.4 mg    ondansetron (ZOFRAN ODT) ODT tab 4 mg    Or    ondansetron (ZOFRAN) injection 4 mg    Patient is already receiving anticoagulation with heparin, enoxaparin (LOVENOX), warfarin (COUMADIN)  or other anticoagulant medication    polyethylene glycol (MIRALAX) Packet 17 g    potassium chloride shelton ER (KLOR-CON M20) CR tablet 40 mEq    prochlorperazine (COMPAZINE) injection 5 mg    Or    prochlorperazine (COMPAZINE) tablet 5 mg    Or    prochlorperazine (COMPAZINE) suppository 12.5 mg    senna-docusate (SENOKOT-S/PERICOLACE) 8.6-50 MG per tablet 1 tablet    Or    senna-docusate (SENOKOT-S/PERICOLACE) 8.6-50 MG per tablet 2 tablet    sodium chloride (PF) 0.9% PF flush 3 mL    sodium chloride (PF) 0.9% PF flush 3 mL    umeclidinium (INCRUSE ELLIPTA) 62.5 MCG/ACT inhaler 1 puff    warfarin ANTICOAGULANT (COUMADIN) tablet 2.5 mg    Warfarin Dose Required Daily - Pharmacist Managed      Labs  )@    Imaging     Assessment/Plan

## 2024-03-09 NOTE — PROGRESS NOTES
[facility]    Medicine Progress Note - Hospitalist Service    Date of Admission:  3/8/2024    Assessment & Plan      Buck Sinclair is a 79 year old male with PMH of chronic respiratory failure on 6-8 LPM O2, pulmonary hypertension, CKD, CAD, chronic atrial fibrillation, anticoagulated, MDD, HLD, admitted on 3/8/2024 with acute on chronic respiratory failure, CHRISSY probably due to overdiuresis, hypokalemia.  Increase O2 requirements, currently on 70% O2 at 50 LPM via HHFNC.  Patient was seen by pulmonology, recommended transfer to [facility] for right heart cath.  Patient has been accepted to Dr. Yañez's service.  Time in transfer pending bed availability.    CHRISSY on CKD3a baseline Cr 1.63 11/23 and slow climb since 3/4 from 2/27 to 3.03 with increase in diuresis  Hypokalemia due to overdiuresis.  Instead of one-time dose as needed, patient used 5 mg metolazone daily for 5 days.  Chronic diastolic CHF.  Essential hypertension.  COPD.  Pulmonary hypertension.  Acute on chronic hypoxic and hypercarbic respiratory failure.   ECHO 5/23 EF 55-60% but severe pulmonary HTN  -S/p gentle IVF in ED.  -Potassium and magnesium replacement  -Bumex, lisinopril and metolazone were held, BNP elevated almost 5000, given albumin post one-time dose IV Bumex  -Monitor renal function  -Electrolytes replacement  -Daily weights and labs  -continue Jardiance  -Continue PTA Spiriva, Symbicort, albuterol  -Supplemental oxygen to keep SpO2 88-92%  -Cardiology consulted  -Transferred to ICU  -Transfer to [facility] for right-sided heart cath to determine PA vasodilator response to prostacyclin and need for and titration of inotropic agents and/or vasodilators     Diet: Combination Diet 2 gm NA Diet; Low Saturated Fat Na <2400mg Diet, No Caffeine Diet    DVT Prophylaxis: Warfarin  Sawyer Catheter: Not present  Lines: None     Cardiac Monitoring: ACTIVE order. Indication: Electrolyte Imbalance (24 hours)- Magnesium <1.3  "mg/ml; Potassium < =2.8 or > 5.5 mg/ml  Code Status: No CPR- Do NOT Intubate.  This was changed from full code on 3/9, per patient's discussion with Dr. Beckwith.    Clinically Significant Risk Factors   # Hypokalemia: Lowest K = 2.2 mmol/L in last 2 days, will replace as needed           # Hypertension: Noted on problem list  # Acute heart failure with preserved ejection fraction: heart failure noted on problem list, last echo with EF >50%, and receiving IV diuretics      # DMII: A1C = N/A within past 6 months, PRESENT ON ADMISSION  # Overweight: Estimated body mass index is 26.8 kg/m  as calculated from the following:    Height as of this encounter: 1.905 m (6' 3\").    Weight as of this encounter: 97.3 kg (214 lb 6.4 oz)., PRESENT ON ADMISSION     # COPD: noted on problem list  # ICD device present    Disposition Plan anticipate transfer to [facility]     Expected Discharge Date: 03/12/2024    Discharge Delays: Echo Result Pending (enter specific test & time in comments)  Specialist Consult (enter specialist & decision needed in comments)  Lab Result Pending (enter specific test & time in comments)  Oxygen Needs - Arrange Home O2  PT Disposition recs needed  OT Disposition recs needed           Viky Reddy MD  Hospitalist Service  [facility]  Securely message with [email or web address] (more info)  Text page via [facility] Paging/Directory   ______________________________________________________________________    Interval History   Patient is new to me.  Chart reviewed.  Patient was seen and examined.  This morning increased O2 requirements, from 10-12 LPM O2 on admission, 2 HHF NC, 80%  35 LPM and further to 70%-50 LPM.  Patient is fevers, chills, productive cough, chest pain, abdominal pain, nausea, dysuria.  Seen by pulmonology, patient reviewed with Dr. Beckwith: CODE STATUS changed to DNR/DNI, transferred to ICU and further transfer to [facility] for right heart cath.    Physical Exam   Vital Signs: Temp: 98.6 "  F (37  C) Temp src: Oral BP: 107/66 Pulse: 82   Resp: 20 SpO2: 96 % O2 Device: High Flow Nasal Cannula (HFNC) Oxygen Delivery: 50 LPM  Weight: 214 lbs 6.4 oz  General: Alert and oriented x 3. Not in obvious distress.  HEENT: NC, AT. Neck- supple, No JVP elevation, lymphadenopathy or thyromegaly. Trachea-central.  Chest: Clear to auscultation bilaterally.  Heart: S1S2 regular. No M/R/G.  Abdomen: Soft. NT, ND. No organomegaly. Bowel sounds- active.  Back: No spine tenderness. No CVA tenderness.  Extremities: No leg swelling. Peripheral pulses 2+ bilaterally.  Neuro: Cranial nerves 1-12 grossly normal. No focal neurological defici    Medical Decision Making     50 MINUTES SPENT BY ME on the date of service doing chart review, history, exam, documentation & further activities per the note.      Data     I have personally reviewed the following data over the past 24 hrs:    N/A  \   N/A   / N/A     139 94 (L) 66.5 (H) /  116 (H)   3.2 (L) 33 (H) 2.70 (H) \     Trop: N/A BNP: 4,741 (H)     INR:  2.82 (H) PTT:  N/A   D-dimer:  N/A Fibrinogen:  N/A       Imaging results reviewed over the past 24 hrs:   Recent Results (from the past 24 hour(s))   XR Chest Port 1 View    Narrative    EXAM: XR CHEST PORT 1 VIEW  LOCATION: [facility]  DATE: 3/9/2024    INDICATION: Hypoxia.  COMPARISON: 2024      Impression    IMPRESSION: Interval increase in alveolar opacities right lung base suspicious for progressive alveolar infiltrate or pneumonia. Minimal linear scarring and atelectasis with pleural fluid left lung base. Marked cardiac enlargement. Normal pulmonary   vascularity. Right-sided AICD. Aortic calcification. Degenerative changes in the spine.   Echocardiogram Complete   Result Value    LVEF  55-60%    Narrative    [id number]  [id number]  [id number]  [id number]^FIDELINA^ELIZABETH^L  [address] [address]  Name: ALEJO BROWN  MRN: [id number]  :  1945  Study Date: 03/09/2024 11:07 AM  Age: 79 yrs  Gender: Male  Patient Location: [facility]  Reason For Study: CHF  Ordering Physician: ELIZABETH KITCHEN  Performed By: NOHEMI  BSA: 2.3 m2  Height: 75 in  Weight: 214 lb  HR: 84  BP: 95/52 mmHg  ______________________________________________________________________________  Procedure  Complete Portable Echo Adult. Definity (NDC #01233-907) given intravenously.  ______________________________________________________________________________  Interpretation Summary  Left ventricular size, wall motion and function are normal. The ejection  fraction is 55-60%.  Flattened septum is consistent with RV pressure/volume overload.  The right ventricle is mild to moderately dilated.  Mildly decreased right ventricular systolic function  Right ventricular systolic pressure is elevated, consistent with moderate to  severe pulmonary hypertension.  There is moderate (2+) tricuspid regurgitation.  IVC diameter >2.1 cm collapsing <50% with sniff suggests a high RA pressure  estimated at 15 mmHg or greater.  Ascending Aorta dilatation is present.  ______________________________________________________________________________  Left Ventricle  Left ventricular size, wall motion and function are normal. The ejection  fraction is 55-60%. There is normal left ventricular wall thickness. Flattened  septum is consistent with RV pressure/volume overload.  Right Ventricle  The right ventricle is mild to moderately dilated. Mildly decreased right  ventricular systolic function. TAPSE is normal, which is consistent with  normal right ventricular systolic function. There is a pacemaker lead in the  right ventricle.  Atria  The left atrium is moderately dilated. The right atrium is severely dilated.  Pacer wire in right atrium.  Mitral Valve  There is mild to moderate mitral annular calcification. Mitral valve leaflets  appear normal. There is no evidence of mitral stenosis or  clinically  significant mitral regurgitation.  Tricuspid Valve  Tricuspid valve leaflets appear normal. There is moderate (2+) tricuspid  regurgitation. Right ventricular systolic pressure is elevated, consistent  with moderate to severe pulmonary hypertension.  Aortic Valve  The aortic valve is trileaflet with aortic valve sclerosis. No hemodynamically  significant valvular aortic stenosis.  Pulmonic Valve  The pulmonic valve is not well seen, but is grossly normal.  Vessels  The aorta root is normal. Ascending Aorta dilatation is present. IVC diameter  >2.1 cm collapsing <50% with sniff suggests a high RA pressure estimated at 15  mmHg or greater.  Pericardium  There is no pericardial effusion.  Rhythm  The rhythm was atrial fibrillation.  ______________________________________________________________________________  MMode/2D Measurements & Calculations  IVSd: 1.1 cm  LVIDd: 5.6 cm  LVIDs: 4.0 cm  LVPWd: 1.0 cm  FS: 28.7 %  LV mass(C)d: 238.1 grams  LV mass(C)dI: 105.4 grams/m2  Ao root diam: 3.0 cm  LA dimension: 5.0 cm  LA/Ao: 1.7  LVOT diam: 2.1 cm  LVOT area: 3.5 cm2  Ao root diam index Ht(cm/m): 1.6  Ao root diam index BSA (cm/m2): 1.3  RV Base: 4.8 cm  RWT: 0.37  TAPSE: 1.7 cm  Doppler Measurements & Calculations  MV E max goldy: 73.4 cm/sec  MV dec time: 0.23 sec  LV V1 max P.4 mmHg  LV V1 max: 58.3 cm/sec  LV V1 VTI: 9.5 cm  SV(LVOT): 33.0 ml  SI(LVOT): 14.6 ml/m2  PA acc time: 0.05 sec  TR max goldy: 372.0 cm/sec  TR max P.4 mmHg  RV S Goldy: 10.6 cm/sec  ______________________________________________________________________________  Report approved by: Nalini Orantes 2024 01:34 PM      CT Chest w/o Contrast    Narrative    EXAM: CT CHEST W/O CONTRAST  LOCATION: [facility]  DATE: 3/9/2024  INDICATION: Acute respiratory failure; evaluate for pulmonary infiltrates.  COMPARISON: None.  TECHNIQUE: CT chest without IV contrast. Multiplanar reformats were obtained.  Dose reduction techniques were used.  CONTRAST: None.  FINDINGS:   LUNGS AND PLEURA: Hyperinflation of the lungs and moderate mid and upper lung centrilobular emphysema. Segments of cylindrical bronchiectasis with endobronchial mucosal thickening and secretions in the right middle lobe, lingula and both lower lobes as   well as patchy airspace opacity in the adjacent lung parenchyma consistent with bronchopneumonia. No pleural effusion.  MEDIASTINUM/AXILLAE: Moderate cardiomegaly and severe three-vessel coronary artery calcification. Foci of endocardial calcification in the left atrium from prior ablation. AICD anterior right chest wall with lead tips in the RA and RV. Enlargement   central pulmonary arteries (MPA diameter 4.6 cm) consistent with chronic pulmonary arterial hypertension. No lymphadenopathy. No thoracic aortic aneurysm.  CORONARY ARTERY CALCIFICATION: Severe.  UPPER ABDOMEN: No significant finding.  MUSCULOSKELETAL: Chronic ankylosis of the thoracic spine secondary to bridging endplate osteophyte formation.      Impression    IMPRESSION:   1.  Segments of cylindrical bronchiectasis with endobronchial mucosal thickening and secretions in the right middle lobe, lingula and both lower lobes as well as patchy airspace opacity in the adjacent lung parenchyma consistent with bronchopneumonia.   2.  Hyperinflation of the lungs and moderate mid and upper lung centrilobular emphysema.   3.  Moderate cardiomegaly and severe three-vessel coronary artery calcification.  4.  Enlargement central pulmonary arteries (MPA diameter 4.6 cm) consistent with chronic pulmonary arterial hypertension.

## 2024-03-10 NOTE — PROGRESS NOTES
03/10/24 1415   Appointment Info   Signing Clinician's Name / Credentials (PT) Raul Hernandez, PT   Rehab Comments (PT) [place], 8 L o2 at baseline   Living Environment   People in Home spouse   Current Living Arrangements house   Home Accessibility stairs to enter home;stairs within home   Number of Stairs, Main Entrance 3   Stair Railings, Main Entrance railings safe and in good condition   Number of Stairs, Within Home, Primary ten   Stair Railings, Within Home, Primary railings safe and in good condition   Transportation Anticipated family or friend will provide   Living Environment Comments Lives with spouse   Self-Care   Usual Activity Tolerance moderate   Current Activity Tolerance fair   Equipment Currently Used at Home walker, rolling   Fall history within last six months no   Activity/Exercise/Self-Care Comment 8 L o2 via NC at baseline, no AD in the house and 4WW outside of the house. Wife present and supportive. Had home health  briefly after hospitlization 1 year ago.   General Information   Onset of Illness/Injury or Date of Surgery 03/09/24   Referring Physician Viky Reddy MD   Patient/Family Therapy Goals Statement (PT) return home   Pertinent History of Current Problem (include personal factors and/or comorbidities that impact the POC) Buck Sinclair is a 79 year old male with PMH of chronic respiratory failure on 6-8 LPM O2, pulmonary hypertension, CKD, CAD, chronic atrial fibrillation, anticoagulated, MDD, HLD, admitted on 3/8/2024 with acute on chronic respiratory failure, CHRISSY probably due to overdiuresis, hypokalemia.  Increase O2 requirements, currently on 70% O2 at 50 LPM via HHFNC.  Patient was seen by pulmonology, recommended transfer to [facility] for right heart cath.   Existing Precautions/Restrictions fall   General Observations 100% fio2, VSS, bp's around 90's systolic   Cognition   Affect/Mental Status (Cognition) WFL   Cognitive Status Comments [place]   Strength (Manual Muscle  Testing)   Strength Comments LE's grossly 4/5, generalized weakness and deconditioning as demonstrated by slow movements and mobility   Bed Mobility   Comment, (Bed Mobility) CGA per patient/nurse report   Transfers   Comment, (Transfers) CGA sit<>stand   Gait/Stairs (Locomotion)   Comment, (Gait/Stairs) unable to ambulate due to o2 needs and fatigue at this time.   Balance   Balance Comments Fair in standing   Clinical Impression   Criteria for Skilled Therapeutic Intervention Yes, treatment indicated   PT Diagnosis (PT) impaired functional mobility   Influenced by the following impairments weakness, increased o2 needs, deconditioning   Functional limitations due to impairments ambulation, stair negotiation   Clinical Presentation (PT Evaluation Complexity) evolving   Clinical Presentation Rationale clinical judgment   Clinical Decision Making (Complexity) low complexity   Planned Therapy Interventions (PT) balance training;bed mobility training;gait training;home exercise program;stair training;strengthening;neuromuscular re-education   Risk & Benefits of therapy have been explained evaluation/treatment results reviewed;care plan/treatment goals reviewed;risks/benefits reviewed;current/potential barriers reviewed;participants voiced agreement with care plan;participants included;patient;spouse/significant other   PT Total Evaluation Time   PT Eval, Low Complexity Minutes (91998) 6   Physical Therapy Goals   PT Frequency 5x/week   PT Predicted Duration/Target Date for Goal Attainment 03/23/24   PT Goals Bed Mobility;Transfers;Gait;Stairs   PT: Bed Mobility Independent;Supine to/from sit   PT: Transfers Independent;Bed to/from chair   PT: Gait Modified independent;Greater than 200 feet  (LRAD)   PT: Stairs Modified independent;Greater than 10 stairs   PT Discharge Planning   PT Plan sit to stands, gait within o2 parameters, ther ex   PT Discharge Recommendation (DC Rec) Transitional Care Facility;home with home care  physical therapy   PT Rationale for DC Rec Patient below baseline independent mobility, currently limited by o2 neesd, deconditioning. Has good support at home. Will need to greatly improve medical status and mobility in order to DC home with spouse support and possible home care therapy.   PT Brief overview of current status 1A pivots   Total Session Time   Total Session Time (sum of timed and untimed services) 6

## 2024-03-10 NOTE — H&P
[facility]  Cardiology Heart Failure Service (Cards 2) H&P Note    Patient Name: Buck Sinclair    Medical Record Number: [id number]    YOB: 1945  PCP: Vivek Guerrero    Admit Date/Time: 3/9/2024 10:02 PM   1    Assessment & Plan   79 y.o. male patient with PMH chronic RS failure on 6-8L NC, PHTN, CKD IIIA, CAD s/p CANDELARIA x3 to the moqejfbm-im-wfzjci LAD (9/2017), longstanding persistent AF s/p multiple electrical cardioversions and extensive catheter ablations x2 in 2011 (on coumadin), ischemic cardiomyopathy s/p Medtronic dual-chamber PPM in 1999 replaced with a Medtronic dual-chamber implantable cardiac defibrillator on 6/11/2014, MDD, HLD who was admitted on 3/8/2024 with acute on chronic RS failure and acute renal failure with concerns for possible over diuresis who was transferred for a RHC.    Neuro:   #Depression    Plan:  -Continue home prozac     CV:  #Ischemic cardiomyopathy with recovered LV function and mildly reduced RV function  Longstanding persistent AF s/p multiple electrical cardioversions and extensive catheter ablations x2 in 2011 (on coumadin)  #Essential hypertension.  #CAD - LAD PCI/CANDELARIA 9/20/2017   ECHO 5/23 EF 55-60% but severe pulmonary HTN    Plan:   -Continue aspirin, statin    -Hold coumadin in anticipation of RHC, start on heparin gtt  - RHC on Monday with vasodilatory testing  -Follow-up PHTN labs  -Hold coumadin    Pulm:  #COPD GOLD E; brief history of tobacco use and inhalational exposure working as a .   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  Acute on chronic hypoxic and hypercarbic respiratory failure.    Plan:   - Continue PTA Spiriva, Symbicort, albuterol  - Supplemental oxygen to keep SpO2>89%  - Low threshold to manage for possible COPD exacerbation    GI/Nutrition:  NO active issues    Plan:  -Cardiac diet  -NPO midnight     Renal:  #CHRISSY on CKD3a baseline Cr 1.63 11/23 and slow climb  since 3/4 from 2/27 to 3.03 with increase in diuresis   #Hypokalemia    Plan:   -avoid nephrotoxic meds  -Consider renal ultrasound  -Hold off on diuresis for now, appears euvolemic    ID:   No active concern for infection    Plan:     Heme:   -No active issue     Plan:   -Transfuse with Hgb goal >7  -Iron panel    Endo:  DM2    Plan:  -May need sliding scale insulin  -Keep BG <180     MSK/Rheum:    - No active issues    DVT Prophylaxis: Heparin gtt  Disposition:   - Unit 4c  Code Status:   Full Code     Thank you for allowing me to care for this patient, please don't hesitate to contact me with any questions regarding this plan.   Patient was discussed and evaluated with Velia Adams MD, attending physician, who agrees with the assessment and plan above.    Douglas Portillo MD  Cardiology Fellow      Chief Complaint   Acute hypoxic respiratory failure      History of Present Illness   79 y.o. male patient with PMH chronic RS failure on 6-8L NC, PHTN, CKD IIIA, CAD s/p CANDELARIA x3 to the rarvrbzh-xk-fqdkaj LAD (9/2017), longstanding persistent AF s/p multiple electrical cardioversions and extensive catheter ablations x2 in 2011 (on coumadin), ischemic cardiomyopathy s/p Medtronic dual-chamber PPM in 1999 replaced with a Medtronic dual-chamber implantable cardiac defibrillator on 6/11/2014, MDD, HLD who was admitted on 3/8/2024 with acute on chronic RS failure and acute renal failure with concerns for possible over diuresis who was transferred for a C. Patient notes he has been having increased SOB with increased aggressive outpatient diuresis, he had a worsening creatinine causing him to present to the ED. He received 500cc of IV fluids in the ED. His home diuretics were held due to his CHRISSY. As patient had increased oxygen requirements to 70% fio2 on 50LPM pulm saw him and recommended transfer to the [facility] for RHC.    Past Medical History    I have reviewed this patient's medical history and updated it  with pertinent information if needed.   Past Medical History:   Diagnosis Date    Anemia     Asthma without status asthmaticus 05/05/2021    Atrial fibrillation (H)     BPH (benign prostatic hyperplasia)     Cardiomyopathy (H)     CKD (chronic kidney disease) stage 3, GFR 30-59 ml/min (H) 05/24/2023    Congestive heart failure (H)     COPD, group B, by GOLD 2017 classification (H)     Coronary artery disease due to calcified coronary lesion     Dyslipidemia, goal LDL below 70     Essential hypertension     Heart failure with reduced ejection fraction (H) 08/17/2023    Hemoptysis 10/04/2017    History of transfusion     Hyperlipidemia     Persistent atrial fibrillation (H)     Pneumonia of left lower lobe due to infectious organism 10/04/2017    Pulmonary hypertension (H)     Skin cancer of trunk     Status post catheter ablation of atrial fibrillation 06/07/2017    PVI 4-2011 (Cryo/PVI + roof line + CTI line) Re-do PVI 7-2011 (RFA/PVI + CFE + VIDYA + confirmed CTI line)    Ventricular tachycardia (H)        Past Surgical History   I have reviewed this patient's surgical history and updated it with pertinent information if needed.  Past Surgical History:   Procedure Laterality Date    CARDIAC DEFIBRILLATOR PLACEMENT      CARDIOVERSION  07/11/2018    x20, last 2/12/15, 10/2015, 11/18/16, 6/16/17 by Lauren Foster CNP    CARDIOVERSION  07/11/2018    CARDIOVERSION  11/19/2021    COLONOSCOPY N/A 04/28/2017    Procedure: COLONOSCOPY with 2 ascending polyps and 1 transverse polyp;  Surgeon: Jose Whittington MD;  Location: [facility] GI;  Service:     CORONARY ANGIOGRAPHY ADULT ORDER      CV CORONARY ANGIOGRAM N/A 09/20/2017    Procedure: Coronary Angiogram;  Surgeon: Sergio Cervantes MD;  Location: [facility] Cath Lab;  Service:     CV CORONARY ANGIOGRAM N/A 01/24/2022    Procedure: Coronary Angiogram;  Surgeon: Christi Saunders MD;  Location: [facility] CATH LAB CV    CV LEFT HEART CATH N/A 01/24/2022    Procedure: Left  Heart Cath;  Surgeon: Christi Saunders MD;  Location: [facility] CATH LAB CV    CV RIGHT AND LEFT HEART CATH N/A 01/24/2022    Procedure: Right and Left Heart Catherization;  Surgeon: Christi Saunders MD;  Location: [facility] CATH LAB CV    EP ICD GENERATOR REPLACEMENT DUAL N/A 10/28/2022    Procedure: Implantable Cardioverter Defibrillator Generator Replacement Dual;  Surgeon: Elo Collins MD;  Location: [facility] CV    EP ICD INSERT      FRACTURE SURGERY Left     wrist    HEART CATH, ANGIOPLASTY      IMPLANT AUTOMATIC IMPLANTABLE CARDIOVERTER DEFIBRILLATOR      INGUINAL HERNIA REPAIR Left 1967    while in the [employer] in [place] after 13 month in [place]    INSERT / REPLACE / REMOVE PACEMAKER      IR MISCELLANEOUS PROCEDURE  04/30/2014    OTHER SURGICAL HISTORY      left hand surgery---tendon repair    CO ABLATE HEART DYSRHYTHM FOCUS  04/2011    Catheter Ablation Atrial Fibrillation PVI Apr 2011 (Cryo+RF-PVI + roof line + CTI line)    CO ABLATE HEART DYSRHYTHM FOCUS  07/2011    Re-do PVI Jul 2011 (RFA-PVI + CFE + VIYDA + confirmation of CTI line)    TOTAL SHOULDER REPLACEMENT Right 03/03/2016    Dr. Abernathy of [facility]    WRIST SURGERY Left     ZZC MYERS W/O FACETEC FORAMOT/DSKC 1/2 VRT SEG, CERVICAL      Laminectomy Lumbar;  Recorded: 03/09/2012;           Allergies   Allergies   Allergen Reactions    Adhesive Tape Other (See Comments)     ADHESIVE TAPE; SKIN IRRITATION; Skin pulled off with foam tape      Amiodarone      ADVERSE REACTION.  Sunlight sensitivity.    Lisinopril        Current medications   Current Facility-Administered Medications   Medication    acetaminophen (TYLENOL) tablet 650 mg    albuterol (PROVENTIL HFA/VENTOLIN HFA) inhaler    aspirin EC tablet 81 mg    atorvastatin (LIPITOR) tablet 40 mg    glucose gel 15-30 g    Or    dextrose 50 % injection 25-50 mL    Or    glucagon injection 1 mg    empagliflozin (JARDIANCE) tablet 10 mg    FLUoxetine (PROzac) capsule 20 mg     fluticasone-vilanterol (BREO ELLIPTA) 200-25 MCG/ACT inhaler 1 puff    heparin ANTICOAGULANT injection 5,000 Units    metoprolol succinate ER (TOPROL XL) 24 hr tablet 25 mg    Patient is already receiving mechanical prophylaxis    polyethylene glycol (MIRALAX) Packet 17 g    potassium & sodium phosphates (NEUTRA-PHOS) Packet 1 packet    sodium chloride (OCEAN) 0.65 % nasal spray 2 spray    umeclidinium (INCRUSE ELLIPTA) 62.5 MCG/ACT inhaler 1 puff    vitamin C (ASCORBIC ACID) tablet 1,000 mg    [START ON 3/11/2024] vitamin D2 (ERGOCALCIFEROL) 94583 units (1250 mcg) capsule 50,000 Units       Medications Prior to Admission   Medication Sig Dispense Refill Last Dose    acetaminophen (TYLENOL) 325 MG tablet Take 650 mg by mouth 2 times daily as needed       albuterol (PROAIR HFA/PROVENTIL HFA/VENTOLIN HFA) 108 (90 Base) MCG/ACT inhaler INHALE 2 PUFFS INTO THE LUNGS EVERY 4 HOURS AS NEEDED FOR SHORTNESS OF BREATH OR WHEEZING 18 g 2     Ascorbic Acid (VITAMIN C) 500 MG CAPS Take 1,000 mg by mouth daily       aspirin (ASA) 81 MG EC tablet Take 1 tablet (81 mg) by mouth daily 90 tablet 0     atorvastatin (LIPITOR) 40 MG tablet Take 40 mg by mouth every evening       budesonide-formoterol (SYMBICORT) 160-4.5 MCG/ACT Inhaler Inhale 2 puffs into the lungs 2 times daily 10.2 g 11     bumetanide (BUMEX) 2 MG tablet Take 1 tablet (2 mg) by mouth 2 times daily (Patient taking differently: Take 2 mg by mouth 2 times daily Increase dose to 4mg twice daily starting 3/1 or as instructed by cardiology) 180 tablet 3     co-enzyme Q-10 100 MG CAPS capsule Take 2 capsules (200 mg) by mouth daily 2 capsule      empagliflozin (JARDIANCE) 10 MG TABS tablet Take 1 tablet (10 mg) by mouth daily 90 tablet 3     fish oil-omega-3 fatty acids 1000 MG capsule Take 1 g by mouth 2 times daily       FLUoxetine (PROZAC) 20 MG capsule TAKE 1 CAPSULE BY MOUTH EVERY DAY       MAG64 64 MG TBEC CR tablet TAKE 2 TABLETS BY MOUTH EVERY  tablet 1      "metolazone (ZAROXOLYN) 5 MG tablet Take 1 tablet (5 mg) by mouth daily 10 tablet 0     metoprolol succinate ER (TOPROL XL) 25 MG 24 hr tablet Take 1 tablet (25 mg) by mouth daily 30 tablet 11     multivitamin, therapeutic (THERA-VIT) TABS tablet Take 1 tablet by mouth daily       nitroGLYcerin (NITROSTAT) 0.4 MG sublingual tablet One tablet under the tongue every 5 minutes if needed for chest pain. May repeat every 5 minutes for a maximum of 3 doses in 15 minutes\" 25 tablet 3     polyethylene glycol (MIRALAX) 17 GM/Dose powder Take 17 g by mouth daily        potassium chloride ER (K-TAB) 20 MEQ CR tablet Take 4 tablets (80 mEq) by mouth daily (Patient taking differently: Take 80 mEq by mouth once) 60 tablet 0     sodium chloride (OCEAN) 0.65 % nasal spray Spray 2 sprays in nostril 4 times daily as needed       tiotropium (SPIRIVA) 18 MCG inhaled capsule Inhale 1 capsule (18 mcg) into the lungs daily 30 capsule 11     vitamin D2 (ERGOCALCIFEROL) 74033 units (1250 mcg) capsule Take 50,000 Units by mouth twice a week On Sunday and Wednesday       warfarin ANTICOAGULANT (COUMADIN) 2.5 MG tablet Take 1 tablet (2.5 mg) daily or as directed by the INR clinic 110 tablet 0     OXYGEN-HELIUM IN 4-5 L           Social History   I have reviewed this patient's social history and updated it with pertinent information if needed. Buck Sinclair  reports that he quit smoking about 56 years ago. His smoking use included cigarettes. He has a 4 pack-year smoking history. He has never used smokeless tobacco. He reports current alcohol use of about 2.0 standard drinks of alcohol per week. He reports that he does not use drugs.    Family History   I have reviewed this patient's family history and updated it with pertinent information if needed.   Family History   Problem Relation Age of Onset    Cancer Mother         leukemia    Cancer Father         bladder    Cancer Sister         breast with lung met.    Aneurysm Sister     CABG " Brother     CABG Brother     Valvular heart disease Brother         valve replacement       Review of Systems   Review of systems was not needed on this patient    Physical Exam   Temp: 97.7  F (36.5  C) Temp src: Axillary BP: 97/64 Pulse: 91   Resp: 22 SpO2: (!) 87 % O2 Device: High Flow Nasal Cannula (HFNC) Oxygen Delivery: 55 LPM    Vital Signs with Ranges  Temp:  [97.1  F (36.2  C)-99.1  F (37.3  C)] 97.7  F (36.5  C)  Pulse:  [75-91] 91  Resp:  [15-22] 22  BP: ()/(50-76) 97/64  FiO2 (%):  [60 %-100 %] 100 %  SpO2:  [85 %-97 %] 87 %  0 lbs 0 oz    General appearance - Lying in bed; appears in no acute distress.  Head: Normocephalic and atraumatic.   Eyes: Pupils are equal, round, and reactive to light. Extraocular movement intact. No conjunctival pallor. No scleral icterus.  Neck: No JVD, bruit.  Cardiovascular: Regular rhythm. S1 and S2 auscultated. No murmurs, rub, or gallops.  Pulmonary/Chest: Normal resp effort on RA, speaking in full sentences. Clear breath sounds to auscultation bilaterally. No wheezes, crackles, or rhonchi. No chest tenderness.   Abdominal: Bowel sounds present. Soft. No distension. No rigidity, rebound or guarding. No organomegaly, hernias or palpable masses on deep palpation. No CVA tenderness.  Extremities: Warm, no cyanosis, 2+ distal pulses bilaterally. No edema.   Skin: Skin is warm and not diaphoretic. No rash, bruising, or erythema.  Neuro: Alert and oriented to person, place, and time. No focal neurological deficit.    Data   Data reviewed today:  I personally reviewed:        TTE: 03/09/2024   Left ventricular size, wall motion and function are normal. The ejection  fraction is 55-60%.  Flattened septum is consistent with RV pressure/volume overload.  The right ventricle is mild to moderately dilated.  Mildly decreased right ventricular systolic function  Right ventricular systolic pressure is elevated, consistent with moderate to  severe pulmonary hypertension.  There is  moderate (2+) tricuspid regurgitation.  IVC diameter >2.1 cm collapsing <50% with sniff suggests a high RA pressure  estimated at 15 mmHg or greater.  Ascending Aorta dilatation is present.    LHC: 1/24/22   Left Main  The vessel was visualized by selective angiography and is moderate in size. There was 0% vessel disease.  Left Anterior Descending  Previously placed Prox LAD to Mid LAD drug eluting stent is widely patent. Previous treatment took place >2 years ago. No thrombosis in the previous stent. No restenosis in the previous stent.  Left Circumflex  Third Obtuse Marginal Branch  3rd Mrg lesion is 30% stenosed.  Right Coronary Artery  Mid RCA lesion is 20% stenosed.    RHC: 1/24/22    LVEDP and PCWP are normal. The PA pressure was elevated at 66/24 mmHg with a mean pressure of 37 mmHg. See numbers below.   Shikha and TD cardiac outputs were both 5.7 l/min.   Sats on 2L NC oxygen: Ao 93%, PA 60%, RA 59%    VQ Scan 5/25/23:  Low probability of pulmonary embolism.     CT Chest at OSH:  IMPRESSION:   1.  Segments of cylindrical bronchiectasis with endobronchial mucosal thickening and secretions in the right middle lobe, lingula and both lower lobes as well as patchy airspace opacity in the adjacent lung parenchyma consistent with bronchopneumonia.   2.  Hyperinflation of the lungs and moderate mid and upper lung centrilobular emphysema.   3.  Moderate cardiomegaly and severe three-vessel coronary artery calcification.  4.  Enlargement central pulmonary arteries (MPA diameter 4.6 cm) consistent with chronic pulmonary arterial hypertension.     LABS Reviewed  Recent Labs   Lab 03/09/24  2315 03/09/24  2211 03/09/24  1948 03/09/24  1358 03/09/24  0510 03/08/24  2220 03/08/24  1340 03/08/24  1334   WBC  --   --   --   --   --   --   --  8.3   HGB  --   --   --   --   --   --   --  13.2*   MCV  --   --   --   --   --   --   --  100   PLT  --   --   --   --   --   --   --  176   INR 3.08*  --   --   --  2.82*  --  2.94*   --      --   --   --  139  --   --  137   POTASSIUM 3.3*  --  3.3* 3.2* 2.7*   < >  --  2.3*   CHLORIDE 94*  --   --   --  94*  --   --  88*   CO2 31*  --   --   --  33*  --   --  33*   BUN 63.5*  --   --   --  66.5*  --   --  65.3*   CR 2.48*  --   --   --  2.70*  --   --  3.03*   ANIONGAP 15  --   --   --  12  --   --  16*   AMBIKA 10.2  --   --   --  9.4  --   --  9.9   * 155*  --   --  116*  --   --  141*   ALBUMIN 4.3  --   --   --   --   --   --   --    PROTTOTAL 7.5  --   --   --   --   --   --   --    BILITOTAL 1.7*  --   --   --   --   --   --   --    ALKPHOS 77  --   --   --   --   --   --   --    ALT 13  --   --   --   --   --   --   --    AST 33  --   --   --   --   --   --   --     < > = values in this interval not displayed.       IMAGES Reviewed    Recent Results (from the past 24 hour(s))   XR Chest Port 1 View    Narrative    EXAM: XR CHEST PORT 1 VIEW  LOCATION: [facility]  DATE: 3/9/2024    INDICATION: Hypoxia.  COMPARISON: 2024      Impression    IMPRESSION: Interval increase in alveolar opacities right lung base suspicious for progressive alveolar infiltrate or pneumonia. Minimal linear scarring and atelectasis with pleural fluid left lung base. Marked cardiac enlargement. Normal pulmonary   vascularity. Right-sided AICD. Aortic calcification. Degenerative changes in the spine.   Echocardiogram Complete   Result Value    LVEF  55-60%    Narrative    [id number]  [id number]  [id number]  [id number]^FIDELINA^ELIZABETH^L     [place] [id number]     Name: ALEJO BROWN  MRN: [id number]  : 1945  Study Date: 2024 11:07 AM  Age: 79 yrs  Gender: Male  Patient [facility]: [facility]  Reason For Study: CHF  Ordering Physician: ELIZABETH KITCHEN  Performed By: NOHEMI     BSA: 2.3 m2  Height: 75 in  Weight: 214 lb  HR: 84  BP: 95/52  mmHg  ______________________________________________________________________________  Procedure  Complete Portable Echo Adult. Definity (NDC #26238-621) given intravenously.  ______________________________________________________________________________  Interpretation Summary     Left ventricular size, wall motion and function are normal. The ejection  fraction is 55-60%.  Flattened septum is consistent with RV pressure/volume overload.  The right ventricle is mild to moderately dilated.  Mildly decreased right ventricular systolic function  Right ventricular systolic pressure is elevated, consistent with moderate to  severe pulmonary hypertension.  There is moderate (2+) tricuspid regurgitation.  IVC diameter >2.1 cm collapsing <50% with sniff suggests a high RA pressure  estimated at 15 mmHg or greater.  Ascending Aorta dilatation is present.  ______________________________________________________________________________  Left Ventricle  Left ventricular size, wall motion and function are normal. The ejection  fraction is 55-60%. There is normal left ventricular wall thickness. Flattened  septum is consistent with RV pressure/volume overload.     Right Ventricle  The right ventricle is mild to moderately dilated. Mildly decreased right  ventricular systolic function. TAPSE is normal, which is consistent with  normal right ventricular systolic function. There is a pacemaker lead in the  right ventricle.     Atria  The left atrium is moderately dilated. The right atrium is severely dilated.  Pacer wire in right atrium.     Mitral Valve  There is mild to moderate mitral annular calcification. Mitral valve leaflets  appear normal. There is no evidence of mitral stenosis or clinically  significant mitral regurgitation.     Tricuspid Valve  Tricuspid valve leaflets appear normal. There is moderate (2+) tricuspid  regurgitation. Right ventricular systolic pressure is elevated, consistent  with moderate to severe  pulmonary hypertension.     Aortic Valve  The aortic valve is trileaflet with aortic valve sclerosis. No hemodynamically  significant valvular aortic stenosis.     Pulmonic Valve  The pulmonic valve is not well seen, but is grossly normal.     Vessels  The aorta root is normal. Ascending Aorta dilatation is present. IVC diameter  >2.1 cm collapsing <50% with sniff suggests a high RA pressure estimated at 15  mmHg or greater.     Pericardium  There is no pericardial effusion.     Rhythm  The rhythm was atrial fibrillation.  ______________________________________________________________________________  MMode/2D Measurements & Calculations     IVSd: 1.1 cm  LVIDd: 5.6 cm  LVIDs: 4.0 cm  LVPWd: 1.0 cm  FS: 28.7 %  LV mass(C)d: 238.1 grams  LV mass(C)dI: 105.4 grams/m2  Ao root diam: 3.0 cm  LA dimension: 5.0 cm  LA/Ao: 1.7  LVOT diam: 2.1 cm  LVOT area: 3.5 cm2  Ao root diam index Ht(cm/m): 1.6  Ao root diam index BSA (cm/m2): 1.3  RV Base: 4.8 cm  RWT: 0.37  TAPSE: 1.7 cm     Doppler Measurements & Calculations  MV E max goldy: 73.4 cm/sec  MV dec time: 0.23 sec  LV V1 max P.4 mmHg  LV V1 max: 58.3 cm/sec  LV V1 VTI: 9.5 cm     SV(LVOT): 33.0 ml  SI(LVOT): 14.6 ml/m2  PA acc time: 0.05 sec  TR max goldy: 372.0 cm/sec  TR max P.4 mmHg  RV S Goldy: 10.6 cm/sec     ______________________________________________________________________________  Report approved by: Nalini Orantes 2024 01:34 PM         CT Chest w/o Contrast    Narrative    EXAM: CT CHEST W/O CONTRAST  LOCATION: [facility]  DATE: 3/9/2024    INDICATION: Acute respiratory failure; evaluate for pulmonary infiltrates.  COMPARISON: None.  TECHNIQUE: CT chest without IV contrast. Multiplanar reformats were obtained. Dose reduction techniques were used.  CONTRAST: None.    FINDINGS:   LUNGS AND PLEURA: Hyperinflation of the lungs and moderate mid and upper lung centrilobular emphysema. Segments of cylindrical  bronchiectasis with endobronchial mucosal thickening and secretions in the right middle lobe, lingula and both lower lobes as   well as patchy airspace opacity in the adjacent lung parenchyma consistent with bronchopneumonia. No pleural effusion.    MEDIASTINUM/AXILLAE: Moderate cardiomegaly and severe three-vessel coronary artery calcification. Foci of endocardial calcification in the left atrium from prior ablation. AICD anterior right chest wall with lead tips in the RA and RV. Enlargement   central pulmonary arteries (MPA diameter 4.6 cm) consistent with chronic pulmonary arterial hypertension. No lymphadenopathy. No thoracic aortic aneurysm.    CORONARY ARTERY CALCIFICATION: Severe.    UPPER ABDOMEN: No significant finding.    MUSCULOSKELETAL: Chronic ankylosis of the thoracic spine secondary to bridging endplate osteophyte formation.      Impression    IMPRESSION:   1.  Segments of cylindrical bronchiectasis with endobronchial mucosal thickening and secretions in the right middle lobe, lingula and both lower lobes as well as patchy airspace opacity in the adjacent lung parenchyma consistent with bronchopneumonia.   2.  Hyperinflation of the lungs and moderate mid and upper lung centrilobular emphysema.   3.  Moderate cardiomegaly and severe three-vessel coronary artery calcification.  4.  Enlargement central pulmonary arteries (MPA diameter 4.6 cm) consistent with chronic pulmonary arterial hypertension.                151

## 2024-03-10 NOTE — PLAN OF CARE
ICU End of Shift Summary. See flowsheets for vital signs and detailed assessment.    Changes this shift: Alert and oriented x4. Denies pain, nausea. Wean HFNC as tolerated, currently on 70% 40L, meeting sat goal > 88%. Patient up to chair and encourage to stand and walk to the chair, assist x1. Encourage patient to eat, NPO after midnight for R heart cath tomorrow 3/11 per team. Echo completed at bedside. Trop 60, recheck improved. Lactic acid 3 recheck 1.9. [place], hearing aids in and used pocket talker. INR 3, team reports ok to recheck tomorrow AM.     Plan: Wean FiO2 as tolerated. Encourage activity as tolerated. R heart cath tomorrow 3/11 per team. Continue with POC.     Goal Outcome Evaluation:      Plan of Care Reviewed With: patient    Overall Patient Progress: no changeOverall Patient Progress: no change    Outcome Evaluation: Awaiting R heart cath, tomorrow, NPO at midnight. HFNC.

## 2024-03-10 NOTE — PLAN OF CARE
Problem: Gas Exchange Impaired  Goal: Optimal Gas Exchange  Outcome: Progressing  Intervention: Optimize Oxygenation and Ventilation  Recent Flowsheet Documentation  Taken 3/9/2024 1554 by Andree Velasquez, RN  Head of Bed (HOB) Positioning: HOB at 20-30 degrees     Problem: Comorbidity Management  Goal: Maintenance of Heart Failure Symptom Control  Outcome: Progressing  Intervention: Maintain Heart Failure Management  Recent Flowsheet Documentation  Taken 3/9/2024 1554 by Andree Velasquez, RN  Medication Review/Management: medications reviewed   Goal Outcome Evaluation:                    Pt. was A&Ox4. He was on high flow O2. He had a pacemaker and was on telemetry through out the shift. He was a stand by assist x1. He was on K+ protocol.He has been transferred to [facility] ICU unit for right heart catheterization. Picked up by paramedics at 9.20 pm. called report to receiving nursing unit.

## 2024-03-10 NOTE — PROGRESS NOTES
[facility]  Cardiology Heart Failure Service (Cards 2) H&P Note    Patient Name: Buck Sinclair    Medical Record Number: [id number]    YOB: 1945  PCP: Vivek Guerrero    Admit Date/Time: 3/9/2024 10:02 PM   1    Assessment & Plan   79 y.o. male patient with PMH chronic RS failure on 6-8L NC, PHTN, CKD IIIA, CAD s/p CANDELARIA x3 to the [id number]-ucspwf LAD (9/2017), longstanding persistent AF s/p multiple electrical cardioversions and extensive catheter ablations x2 in 2011 (on coumadin), ischemic cardiomyopathy s/p Medtronic dual-chamber PPM in 1999 replaced with a Medtronic dual-chamber implantable cardiac defibrillator on 6/11/2014, MDD, HLD who was admitted on 3/8/2024 with acute on chronic RS failure and acute renal failure with concerns for possible over diuresis who was transferred for a RHC.    Neuro:   #Depression    Plan:  -Continue home prozac     CV:  #Ischemic cardiomyopathy with recovered LV function and mildly reduced RV function  Longstanding persistent AF s/p multiple electrical cardioversions and extensive catheter ablations x2 in 2011 (on coumadin)  #Essential hypertension.  #CAD - LAD PCI/CANDELARIA 9/20/2017   ECHO 5/23 EF 55-60% but severe pulmonary HTN    Plan:   -Continue aspirin, statin    -Hold coumadin in anticipation of RHC, stop heparin gtt given INR is 3.0  - RHC on Monday with vasodilatory testing  - Follow-up PHTN labs  - TTE with bubble study without a shunt run    Pulm:  #COPD GOLD E; brief history of tobacco use and inhalational exposure working as a .   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  Acute on chronic hypoxic and hypercarbic respiratory failure.    Plan:   - Continue PTA Spiriva, Symbicort, albuterol  - Supplemental oxygen to keep SpO2>89%  - Low threshold to manage for possible COPD exacerbation    GI/Nutrition:  NO active issues    Plan:  -Cardiac diet  -NPO midnight for RHC with  vasodilatory study tomorrow     Renal:  #CHRISSY on CKD3a baseline Cr 1.63 11/23 and slow climb since 3/4 from 2/27 to 3.03 with increase in diuresis   #Hypokalemia    Plan:   -avoid nephrotoxic meds  -Consider renal ultrasound  -Hold off on diuresis for now, appears euvolemic    ID:   No active concern for infection    Plan:     Heme:   -No active issue     Plan:   -Transfuse with Hgb goal >7  -Iron panel    Endo:  DM2    Plan:  -May need sliding scale insulin  -Keep BG <180     MSK/Rheum:    - No active issues    DVT Prophylaxis: Heparin gtt  Disposition:   - Unit 4c  Code Status:   No CPR- Do NOT Intubate     Thank you for allowing me to care for this patient, please don't hesitate to contact me with any questions regarding this plan.   Patient was discussed and evaluated with Velia Adams MD, attending physician, who agrees with the assessment and plan above.    Douglas Portillo MD  Cardiology Fellow      Chief Complaint   Acute hypoxic respiratory failure      History of Present Illness   79 y.o. male patient with PMH chronic RS failure on 6-8L NC, PHTN, CKD IIIA, CAD s/p CANDELARIA x3 to the xenkdpsi-ro-cdxsto LAD (9/2017), longstanding persistent AF s/p multiple electrical cardioversions and extensive catheter ablations x2 in 2011 (on coumadin), ischemic cardiomyopathy s/p Medtronic dual-chamber PPM in 1999 replaced with a Medtronic dual-chamber implantable cardiac defibrillator on 6/11/2014, MDD, HLD who was admitted on 3/8/2024 with acute on chronic RS failure and acute renal failure with concerns for possible over diuresis who was transferred for a [facility]. Patient notes he has been having increased SOB with increased aggressive outpatient diuresis, he had a worsening creatinine causing him to present to the ED. He received 500cc of IV fluids in the ED. His home diuretics were held due to his CHRISSY. As patient had increased oxygen requirements to 70% fio2 on 50LPM pulm saw him and recommended transfer to the  [place] for [facility].    Past Medical History    I have reviewed this patient's medical history and updated it with pertinent information if needed.   Past Medical History:   Diagnosis Date    Anemia     Asthma without status asthmaticus 05/05/2021    Atrial fibrillation (H)     BPH (benign prostatic hyperplasia)     Cardiomyopathy (H)     CKD (chronic kidney disease) stage 3, GFR 30-59 ml/min (H) 05/24/2023    Congestive heart failure (H)     COPD, group B, by GOLD 2017 classification (H)     Coronary artery disease due to calcified coronary lesion     Dyslipidemia, goal LDL below 70     Essential hypertension     Heart failure with reduced ejection fraction (H) 08/17/2023    Hemoptysis 10/04/2017    History of transfusion     Hyperlipidemia     Persistent atrial fibrillation (H)     Pneumonia of left lower lobe due to infectious organism 10/04/2017    Pulmonary hypertension (H)     Skin cancer of trunk     Status post catheter ablation of atrial fibrillation 06/07/2017    PVI 4-2011 (Cryo/PVI + roof line + CTI line) Re-do PVI 7-2011 (RFA/PVI + CFE + VIDYA + confirmed CTI line)    Ventricular tachycardia (H)        Past Surgical History   I have reviewed this patient's surgical history and updated it with pertinent information if needed.  Past Surgical History:   Procedure Laterality Date    CARDIAC DEFIBRILLATOR PLACEMENT      CARDIOVERSION  07/11/2018    x20, last 2/12/15, 10/2015, 11/18/16, 6/16/17 by Lauren Foster CNP    CARDIOVERSION  07/11/2018    CARDIOVERSION  11/19/2021    COLONOSCOPY N/A 04/28/2017    Procedure: COLONOSCOPY with 2 ascending polyps and 1 transverse polyp;  Surgeon: Jose Whittington MD;  Location: [facility] GI;  Service:     CORONARY ANGIOGRAPHY ADULT ORDER      CV CORONARY ANGIOGRAM N/A 09/20/2017    Procedure: Coronary Angiogram;  Surgeon: Sergio Cervantes MD;  Location: [facility] Cath Lab;  Service:     CV CORONARY ANGIOGRAM N/A 01/24/2022    Procedure: Coronary Angiogram;  Surgeon:  Christi Saunders MD;  Location: [facility] CATH LAB CV    CV LEFT HEART CATH N/A 01/24/2022    Procedure: Left Heart Cath;  Surgeon: Christi Saunders MD;  Location: [facility] CATH LAB CV    CV RIGHT AND LEFT HEART CATH N/A 01/24/2022    Procedure: Right and Left Heart Catherization;  Surgeon: Christi Saunders MD;  Location: [facility] CATH LAB CV    EP ICD GENERATOR REPLACEMENT DUAL N/A 10/28/2022    Procedure: Implantable Cardioverter Defibrillator Generator Replacement Dual;  Surgeon: Elo Collins MD;  Location: [facility] CV    EP ICD INSERT      FRACTURE SURGERY Left     wrist    HEART CATH, ANGIOPLASTY      IMPLANT AUTOMATIC IMPLANTABLE CARDIOVERTER DEFIBRILLATOR      INGUINAL HERNIA REPAIR Left 1967    while in the [employer] in [place] after 13 month in [place]    INSERT / REPLACE / REMOVE PACEMAKER      IR MISCELLANEOUS PROCEDURE  04/30/2014    OTHER SURGICAL HISTORY      left hand surgery---tendon repair    IA ABLATE HEART DYSRHYTHM FOCUS  04/2011    Catheter Ablation Atrial Fibrillation PVI Apr 2011 (Cryo+RF-PVI + roof line + CTI line)    IA ABLATE HEART DYSRHYTHM FOCUS  07/2011    Re-do PVI Jul 2011 (RFA-PVI + CFE + VIDYA + confirmation of CTI line)    TOTAL SHOULDER REPLACEMENT Right 03/03/2016    Dr. Abernathy of [facility]    WRIST SURGERY Left     ZZC MYERS W/O FACETEC FORAMOT/DSKC 1/2 VRT SEG, CERVICAL      Laminectomy Lumbar;  Recorded: 03/09/2012;           Allergies   Allergies   Allergen Reactions    Adhesive Tape Other (See Comments)     ADHESIVE TAPE; SKIN IRRITATION; Skin pulled off with foam tape      Amiodarone      ADVERSE REACTION.  Sunlight sensitivity.    Lisinopril        Current medications   Current Facility-Administered Medications   Medication    acetaminophen (TYLENOL) tablet 650 mg    albuterol (PROVENTIL HFA/VENTOLIN HFA) inhaler    aspirin EC tablet 81 mg    [Held by provider] atorvastatin (LIPITOR) tablet 40 mg    calcium carbonate (TUMS) chewable tablet 1,000 mg     glucose gel 15-30 g    Or    dextrose 50 % injection 25-50 mL    Or    glucagon injection 1 mg    empagliflozin (JARDIANCE) tablet 10 mg    FLUoxetine (PROzac) capsule 20 mg    fluticasone (ARNUITY ELLIPTA) 100 MCG/ACT inhaler 1 puff    fluticasone-vilanterol (BREO ELLIPTA) 200-25 MCG/ACT inhaler 1 puff    fluticasone-vilanterol (BREO ELLIPTA) 200-25 MCG/ACT inhaler 1 puff    ipratropium - albuterol 0.5 mg/2.5 mg/3 mL (DUONEB) neb solution 3 mL    lidocaine (LMX4) cream    lidocaine 1 % 0.1-1 mL    methylPREDNISolone sodium succinate (SOLU-MEDROL) injection 40 mg    metoprolol succinate ER (TOPROL XL) 24 hr tablet 25 mg    nitroGLYcerin (NITROSTAT) sublingual tablet 0.4 mg    ondansetron (ZOFRAN ODT) ODT tab 4 mg    Or    ondansetron (ZOFRAN) injection 4 mg    Patient is already receiving anticoagulation with heparin, enoxaparin (LOVENOX), warfarin (COUMADIN)  or other anticoagulant medication    Patient is already receiving mechanical prophylaxis    polyethylene glycol (MIRALAX) Packet 17 g    polyethylene glycol (MIRALAX) Packet 17 g    prochlorperazine (COMPAZINE) injection 5 mg    Or    prochlorperazine (COMPAZINE) tablet 5 mg    Or    prochlorperazine (COMPAZINE) suppository 12.5 mg    senna-docusate (SENOKOT-S/PERICOLACE) 8.6-50 MG per tablet 1 tablet    Or    senna-docusate (SENOKOT-S/PERICOLACE) 8.6-50 MG per tablet 2 tablet    sodium chloride (OCEAN) 0.65 % nasal spray 2 spray    sodium chloride (PF) 0.9% PF flush 3 mL    sodium chloride (PF) 0.9% PF flush 3 mL    umeclidinium (INCRUSE ELLIPTA) 62.5 MCG/ACT inhaler 1 puff    vitamin C (ASCORBIC ACID) tablet 1,000 mg    [START ON 3/11/2024] vitamin D2 (ERGOCALCIFEROL) 04654 units (1250 mcg) capsule 50,000 Units    warfarin ANTICOAGULANT (COUMADIN) tablet 2.5 mg    Warfarin Dose Required Daily - Pharmacist Managed       Medications Prior to Admission   Medication Sig Dispense Refill Last Dose    acetaminophen (TYLENOL) 325 MG tablet Take 650 mg by mouth 2  "times daily as needed       albuterol (PROAIR HFA/PROVENTIL HFA/VENTOLIN HFA) 108 (90 Base) MCG/ACT inhaler INHALE 2 PUFFS INTO THE LUNGS EVERY 4 HOURS AS NEEDED FOR SHORTNESS OF BREATH OR WHEEZING 18 g 2     Ascorbic Acid (VITAMIN C) 500 MG CAPS Take 1,000 mg by mouth daily       aspirin (ASA) 81 MG EC tablet Take 1 tablet (81 mg) by mouth daily 90 tablet 0     atorvastatin (LIPITOR) 40 MG tablet Take 40 mg by mouth every evening       budesonide-formoterol (SYMBICORT) 160-4.5 MCG/ACT Inhaler Inhale 2 puffs into the lungs 2 times daily 10.2 g 11     bumetanide (BUMEX) 2 MG tablet Take 1 tablet (2 mg) by mouth 2 times daily (Patient taking differently: Take 2 mg by mouth 2 times daily Increase dose to 4mg twice daily starting 3/1 or as instructed by cardiology) 180 tablet 3     co-enzyme Q-10 100 MG CAPS capsule Take 2 capsules (200 mg) by mouth daily 2 capsule      empagliflozin (JARDIANCE) 10 MG TABS tablet Take 1 tablet (10 mg) by mouth daily 90 tablet 3     fish oil-omega-3 fatty acids 1000 MG capsule Take 1 g by mouth 2 times daily       FLUoxetine (PROZAC) 20 MG capsule TAKE 1 CAPSULE BY MOUTH EVERY DAY       MAG64 64 MG TBEC CR tablet TAKE 2 TABLETS BY MOUTH EVERY  tablet 1     metolazone (ZAROXOLYN) 5 MG tablet Take 1 tablet (5 mg) by mouth daily 10 tablet 0     metoprolol succinate ER (TOPROL XL) 25 MG 24 hr tablet Take 1 tablet (25 mg) by mouth daily 30 tablet 11     multivitamin, therapeutic (THERA-VIT) TABS tablet Take 1 tablet by mouth daily       nitroGLYcerin (NITROSTAT) 0.4 MG sublingual tablet One tablet under the tongue every 5 minutes if needed for chest pain. May repeat every 5 minutes for a maximum of 3 doses in 15 minutes\" 25 tablet 3     polyethylene glycol (MIRALAX) 17 GM/Dose powder Take 17 g by mouth daily        potassium chloride ER (K-TAB) 20 MEQ CR tablet Take 4 tablets (80 mEq) by mouth daily (Patient taking differently: Take 80 mEq by mouth once) 60 tablet 0     sodium chloride " (OCEAN) 0.65 % nasal spray Spray 2 sprays in nostril 4 times daily as needed       tiotropium (SPIRIVA) 18 MCG inhaled capsule Inhale 1 capsule (18 mcg) into the lungs daily 30 capsule 11     vitamin D2 (ERGOCALCIFEROL) 58554 units (1250 mcg) capsule Take 50,000 Units by mouth twice a week On Sunday and Wednesday       warfarin ANTICOAGULANT (COUMADIN) 2.5 MG tablet Take 1 tablet (2.5 mg) daily or as directed by the INR clinic 110 tablet 0     OXYGEN-HELIUM IN 4-5 L           Social History   I have reviewed this patient's social history and updated it with pertinent information if needed. Buck Sinclair  reports that he quit smoking about 56 years ago. His smoking use included cigarettes. He has a 4 pack-year smoking history. He has never used smokeless tobacco. He reports current alcohol use of about 2.0 standard drinks of alcohol per week. He reports that he does not use drugs.    Family History   I have reviewed this patient's family history and updated it with pertinent information if needed.   Family History   Problem Relation Age of Onset    Cancer Mother         leukemia    Cancer Father         bladder    Cancer Sister         breast with lung met.    Aneurysm Sister     CABG Brother     CABG Brother     Valvular heart disease Brother         valve replacement       Review of Systems   Review of systems was not needed on this patient    Physical Exam   Temp: 98.7  F (37.1  C) Temp src: Axillary BP: (!) 86/65 Pulse: 74   Resp: 24 SpO2: 92 % O2 Device: High Flow Nasal Cannula (HFNC) Oxygen Delivery: 35 LPM    Vital Signs with Ranges  Temp:  [97.1  F (36.2  C)-99.7  F (37.6  C)] 98.7  F (37.1  C)  Pulse:  [] 74  Resp:  [13-29] 24  BP: ()/(50-84) 86/65  FiO2 (%):  [60 %-100 %] 60 %  SpO2:  [84 %-100 %] 92 %  0 lbs 0 oz    General appearance - Lying in bed; appears in no acute distress.  Head: Normocephalic and atraumatic.   Eyes: Pupils are equal, round, and reactive to light. Extraocular  movement intact. No conjunctival pallor. No scleral icterus.  Neck: No JVD, bruit.  Cardiovascular: Regular rhythm. S1 and S2 auscultated. No murmurs, rub, or gallops.  Pulmonary/Chest: Normal resp effort on RA, speaking in full sentences. Clear breath sounds to auscultation bilaterally. No wheezes, crackles, or rhonchi. No chest tenderness.   Abdominal: Bowel sounds present. Soft. No distension. No rigidity, rebound or guarding. No organomegaly, hernias or palpable masses on deep palpation. No CVA tenderness.  Extremities: Warm, no cyanosis, 2+ distal pulses bilaterally. No edema.   Skin: Skin is warm and not diaphoretic. No rash, bruising, or erythema.  Neuro: Alert and oriented to person, place, and time. No focal neurological deficit.    Data   Data reviewed today:  I personally reviewed:        TTE: 03/09/2024   Left ventricular size, wall motion and function are normal. The ejection  fraction is 55-60%.  Flattened septum is consistent with RV pressure/volume overload.  The right ventricle is mild to moderately dilated.  Mildly decreased right ventricular systolic function  Right ventricular systolic pressure is elevated, consistent with moderate to  severe pulmonary hypertension.  There is moderate (2+) tricuspid regurgitation.  IVC diameter >2.1 cm collapsing <50% with sniff suggests a high RA pressure  estimated at 15 mmHg or greater.  Ascending Aorta dilatation is present.    TTE with shunt study:  Interpretation Summary  Global and regional left ventricular function is normal with an EF of 55-60%.  Severe right ventricular dilation is present.  Global right ventricular function is moderately reduced.  The right ventricular systolic pressure is 30mmHg above the right atrial  pressure.  Dilation of the inferior vena cava is present with abnormal respiratory  variation in diameter.  No pericardial effusion is present.  Pulmonary hypertension is present.     This study was compared with the study from 3/9/2024  .  PAP appear lower today otherwise no significant changes noted.     There was no shunt at the atrial septal level as assessed by color Doppler and  agitated saline bubble study at rest and with Valsalva maneuver.    LHC: 1/24/22   Left Main  The vessel was visualized by selective angiography and is moderate in size. There was 0% vessel disease.  Left Anterior Descending  Previously placed Prox LAD to Mid LAD drug eluting stent is widely patent. Previous treatment took place >2 years ago. No thrombosis in the previous stent. No restenosis in the previous stent.  Left Circumflex  Third Obtuse Marginal Branch  3rd Mrg lesion is 30% stenosed.  Right Coronary Artery  Mid RCA lesion is 20% stenosed.    RHC: 1/24/22    LVEDP and PCWP are normal. The PA pressure was elevated at 66/24 mmHg with a mean pressure of 37 mmHg. See numbers below.   Shikha and TD cardiac outputs were both 5.7 l/min.   Sats on 2L NC oxygen: Ao 93%, PA 60%, RA 59%    VQ Scan 5/25/23:  Low probability of pulmonary embolism.     CT Chest at OSH:  IMPRESSION:   1.  Segments of cylindrical bronchiectasis with endobronchial mucosal thickening and secretions in the right middle lobe, lingula and both lower lobes as well as patchy airspace opacity in the adjacent lung parenchyma consistent with bronchopneumonia.   2.  Hyperinflation of the lungs and moderate mid and upper lung centrilobular emphysema.   3.  Moderate cardiomegaly and severe three-vessel coronary artery calcification.  4.  Enlargement central pulmonary arteries (MPA diameter 4.6 cm) consistent with chronic pulmonary arterial hypertension.     LABS Reviewed  Recent Labs   Lab 03/10/24  0623 03/09/24  2315 03/09/24  2211 03/09/24  1948 03/09/24  1358 03/09/24  0510 03/08/24  1340 03/08/24  1334   WBC 10.7  --   --   --   --   --   --  8.3   HGB 12.9*  --   --   --   --   --   --  13.2*   *  --   --   --   --   --   --  100     --   --   --   --   --   --  176   INR 3.05* 3.08*  --    --   --  2.82*   < >  --    NA  --  140  --   --   --  139  --  137   POTASSIUM 3.8 3.3*  --  3.3*   < > 2.7*   < > 2.3*   CHLORIDE  --  94*  --   --   --  94*  --  88*   CO2  --  31*  --   --   --  33*  --  33*   BUN  --  63.5*  --   --   --  66.5*  --  65.3*   CR  --  2.48*  --   --   --  2.70*  --  3.03*   ANIONGAP  --  15  --   --   --  12  --  16*   AMBIKA  --  10.2  --   --   --  9.4  --  9.9   GLC  --  135* 155*  --   --  116*  --  141*   ALBUMIN  --  4.3  --   --   --   --   --   --    PROTTOTAL  --  7.5  --   --   --   --   --   --    BILITOTAL  --  1.7*  --   --   --   --   --   --    ALKPHOS  --  77  --   --   --   --   --   --    ALT  --  13  --   --   --   --   --   --    AST  --  33  --   --   --   --   --   --     < > = values in this interval not displayed.       IMAGES Reviewed    Recent Results (from the past 24 hour(s))   XR Chest Port 1 View    Narrative    EXAM: XR CHEST PORT 1 VIEW  3/10/2024 3:59 AM      HISTORY: SOB    COMPARISON: 2023    FINDINGS: Single view of the chest. Right chest wall implantable  cardiac defibrillator with leads projecting over the right atrium and  right ventricle. Cardiac silhouette is enlarged. Patchy bibasilar  pulmonary opacities. The right costophrenic angle is collimated out of  the field-of-view. No left pleural effusion. No appreciable  pneumothorax.      Impression    IMPRESSION: Patchy bibasilar pulmonary opacities, likely atelectasis,  though underlying infection is not excluded.    I have personally reviewed the examination and initial interpretation  and I agree with the findings.    KEYSHA KEMP MD         SYSTEM ID:  [id number]   Echo Limited with Bubble Study   Result Value    LVEF  55-60%    Narrative    [id number]  [id number]  [id number]  [id number]^MAIA^THOMAS^HUGH     [facility]  [facility]  [address] [place] [id number]     Name: ALEJO BROWN  MRN: [id number]  :  1945  Study Date: 03/10/2024 11:06 AM  Age: 79 yrs  Gender: Male  Patient Location: [facility] [place]  Reason For Study: SOB  Ordering Physician: THOMAS CARVALHO  Performed By: Kristen Elaine     BSA: 2.3 m2  Height: 75 in  Weight: 214 lb  BP: 91/60 mmHg  ______________________________________________________________________________  Procedure  Bubble Limited Echocardiogram with portions of two-dimensional, color and  spectral Doppler performed.  ______________________________________________________________________________  Interpretation Summary  Global and regional left ventricular function is normal with an EF of 55-60%.  Severe right ventricular dilation is present.  Global right ventricular function is moderately reduced.  The right ventricular systolic pressure is 30mmHg above the right atrial  pressure.  Dilation of the inferior vena cava is present with abnormal respiratory  variation in diameter.  No pericardial effusion is present.  Pulmonary hypertension is present.     This study was compared with the study from 3/9/2024 .  PAP appear lower today otherwise no significant changes noted.     There was no shunt at the atrial septal level as assessed by color Doppler and  agitated saline bubble study at rest and with Valsalva maneuver.  ______________________________________________________________________________  Left Ventricle  Left ventricular size is normal. Global and regional left ventricular function  is normal with an EF of 55-60%. Mild concentric wall thickening consistent  with left ventricular hypertrophy is present.     Right Ventricle  Severe right ventricular dilation is present. Global right ventricular  function is moderately reduced. A pacemaker lead is noted in the right  ventricle.     Atria  Severe right atrial enlargement is present. Mild left atrial enlargement is  present. There was no shunt at the atrial septal level as assessed by color  Doppler and agitated saline bubble study  at rest and with Valsalva maneuver.     Mitral Valve  Mild mitral annular calcification is present. Trace mitral insufficiency is  present.     Aortic Valve  Trileaflet aortic sclerosis without stenosis. Trace aortic insufficiency is  present.     Tricuspid Valve  Mild tricuspid insufficiency is present. The right ventricular systolic  pressure is approximated at 30.0 mmHg plus the right atrial pressure. The  right ventricular systolic pressure is 30mmHg above the right atrial pressure.  Pulmonary hypertension is present.     Pulmonic Valve  The pulmonic valve is normal.     Vessels  The aorta root is normal. Dilation of the inferior vena cava is present with  abnormal respiratory variation in diameter. IVC diameter >2.1 cm collapsing  <50% with sniff suggests a high RA pressure estimated at 15 mmHg or greater.     Pericardium  No pericardial effusion is present.     Miscellaneous  A left pleural effusion is present.     Compared to Previous Study  This study was compared with the study from 3/9/2024 . No significant changes  noted.  ______________________________________________________________________________  MMode/2D Measurements & Calculations  IVSd: 1.2 cm  LVIDd: 5.2 cm  LVIDs: 4.2 cm  LVPWd: 1.3 cm  FS: 19.1 %  LV mass(C)d: 266.3 grams  LV mass(C)dI: 117.9 grams/m2  RWT: 0.51  TAPSE: 1.3 cm     Doppler Measurements & Calculations  TR max goldy: 273.8 cm/sec  TR max P.0 mmHg  RV S Goldy: 10.4 cm/sec     ______________________________________________________________________________  Report approved by: Ellie FROST 03/10/2024 01:13 PM

## 2024-03-10 NOTE — DISCHARGE SUMMARY
"[facility]  Hospitalist Discharge Summary      Date of Admission:  3/8/2024  Date of Discharge:  3/10/2024  Discharging Provider: Viky Reddy MD  Discharge Service: Hospitalist Service    Discharge Diagnoses   Acute on chronic hypoxic respiratory failure  Pulm hypertension  Acute on chronic renal failure  Hyperlipidemia  Hypokalemia    Clinically Significant Risk Factors   # DMII: A1C = N/A within past 6 months  # Overweight: Estimated body mass index is 26.8 kg/m  as calculated from the following:    Height as of this encounter: 1.905 m (6' 3\").    Weight as of this encounter: 97.3 kg (214 lb 6.4 oz).       Discharge Disposition   Transferred to [facility] to service.  Condition at discharge: Stable    Hospital Course   Buck Sinclair is a 79 year old male with PMH of chronic respiratory failure on 6-8 LPM O2, pulmonary hypertension, CKD, CAD, chronic atrial fibrillation, anticoagulated, MDD, HLD, admitted on 3/8/2024 with acute on chronic respiratory failure, CHRISSY probably due to overdiuresis, hypokalemia.  Increase O2 requirements, currently on 70% O2 at 50 LPM via HHFNC.  Patient was seen by pulmonology, recommended transfer to [facility] for right heart cath.  Patient has been accepted to Dr. Yañez's service.      CHRISSY on CKD3a baseline Cr 1.63 11/23 and slow climb since 3/4 from 2/27 to 3.03 with increase in diuresis  Hypokalemia due to overdiuresis.  Instead of one-time dose as needed, patient used 5 mg metolazone daily for 5 days.  Replacing potassium.  Chronic diastolic CHF.  Not in exacerbation.  Appears to be overdiuresed PTA.  Essential hypertension.  COPD.  Pulmonary hypertension.  Plan for right heart cath at [facility].  Acute on chronic hypoxic and hypercarbic respiratory failure.   ECHO 5/23 EF 55-60% but severe pulmonary HTN  -S/p gentle IVF in ED.  -Potassium and magnesium replacement  -Bumex, lisinopril and metolazone were held, BNP elevated almost " 5000, given albumin post one-time dose IV Bumex  -continue Jardiance  -Continued PTA Spiriva, Symbicort, albuterol  -Supplemental oxygen to keep SpO2 88-92%, but requirements up to 50 LPM at 70%.  -Transfer to [facility] for right-sided heart cath to determine PA vasodilator response to prostacyclin and need for and titration of inotropic agents and/or vasodilators     Diet: Combination Diet 2 gm NA Diet; Low Saturated Fat Na <2400mg Diet, No Caffeine Diet    DVT Prophylaxis: Warfarin  Sawyer Catheter: Not present  Lines: None     Cardiac Monitoring: ACTIVE order. Indication: Electrolyte Imbalance (24 hours)- Magnesium <1.3 mg/ml; Potassium < =2.8 or > 5.5 mg/ml  Code Status: No CPR- Do NOT Intubate.  This was changed from full code on 3/9, per patient's discussion with Dr. Beckwith.    Consultations This Hospital Stay   SOCIAL WORK IP CONSULT  PHYSICAL THERAPY ADULT IP CONSULT  OCCUPATIONAL THERAPY ADULT IP CONSULT  CARDIOLOGY IP CONSULT  PHARMACY TO DOSE WARFARIN  PULMONARY IP CONSULT  PHYSICAL THERAPY ADULT IP CONSULT  Code Status   No CPR- Do NOT Intubate    Time Spent on this Encounter   I, Viky Reddy MD, personally saw the patient today and spent greater than 30 minutes discharging this patient.       Viky Reddy MD  [address] [address]  Phone: [phone]  Fax: [phone]  ______________________________________________________________________    Physical Exam   Vital Signs: Temp: 97.1  F (36.2  C) Temp src: Oral BP: 107/61 Pulse: 75   Resp: 18 SpO2: 92 % O2 Device: High Flow Nasal Cannula (HFNC) Oxygen Delivery: 50 LPM  Weight: 214 lbs 6.4 oz  General: Alert and oriented x 3. Not in obvious distress.  Hard of hearing, with hearing aids.  HEENT: NC, AT. Neck- supple, No JVP elevation, lymphadenopathy or thyromegaly. Trachea-central.  Chest: Clear to auscultation bilaterally.  Heart: S1S2 tachycardia,. No M/R/G.  PPM in place.  Abdomen: Soft.  NT, ND. No organomegaly. Bowel sounds- active.  Back: No spine tenderness. No CVA tenderness.  Extremities: No leg swelling. Peripheral pulses 2+ bilaterally.  Neuro: Cranial nerves 1-12 grossly normal. No focal neurological defici       Primary Care Physician   Vivek Guerrero    Discharge Orders   No discharge procedures on file.    Significant Results and Procedures   Results for orders placed or performed during the hospital encounter of 03/08/24   XR Chest Port 1 View    Narrative    EXAM: XR CHEST PORT 1 VIEW  LOCATION: [facility]  DATE: 3/8/2024    INDICATION: sob  COMPARISON: 05/25/2023      Impression    IMPRESSION: Right cardiac generator and leads are unchanged. Bibasilar scarring/atelectasis. No focal airspace opacity. No pleural effusion or pneumothorax. Unchanged cardiomegaly. Normal mediastinal contours.   XR Chest Port 1 View    Narrative    EXAM: XR CHEST PORT 1 VIEW  LOCATION: [facility]  DATE: 3/9/2024    INDICATION: Hypoxia.  COMPARISON: 03/08/2024      Impression    IMPRESSION: Interval increase in alveolar opacities right lung base suspicious for progressive alveolar infiltrate or pneumonia. Minimal linear scarring and atelectasis with pleural fluid left lung base. Marked cardiac enlargement. Normal pulmonary   vascularity. Right-sided AICD. Aortic calcification. Degenerative changes in the spine.   CT Chest w/o Contrast    Narrative    EXAM: CT CHEST W/O CONTRAST  LOCATION: [facility]  DATE: 3/9/2024    INDICATION: Acute respiratory failure; evaluate for pulmonary infiltrates.  COMPARISON: None.  TECHNIQUE: CT chest without IV contrast. Multiplanar reformats were obtained. Dose reduction techniques were used.  CONTRAST: None.    FINDINGS:   LUNGS AND PLEURA: Hyperinflation of the lungs and moderate mid and upper lung centrilobular emphysema. Segments of cylindrical bronchiectasis with endobronchial mucosal thickening  and secretions in the right middle lobe, lingula and both lower lobes as   well as patchy airspace opacity in the adjacent lung parenchyma consistent with bronchopneumonia. No pleural effusion.    MEDIASTINUM/AXILLAE: Moderate cardiomegaly and severe three-vessel coronary artery calcification. Foci of endocardial calcification in the left atrium from prior ablation. AICD anterior right chest wall with lead tips in the RA and RV. Enlargement   central pulmonary arteries (MPA diameter 4.6 cm) consistent with chronic pulmonary arterial hypertension. No lymphadenopathy. No thoracic aortic aneurysm.    CORONARY ARTERY CALCIFICATION: Severe.    UPPER ABDOMEN: No significant finding.    MUSCULOSKELETAL: Chronic ankylosis of the thoracic spine secondary to bridging endplate osteophyte formation.      Impression    IMPRESSION:   1.  Segments of cylindrical bronchiectasis with endobronchial mucosal thickening and secretions in the right middle lobe, lingula and both lower lobes as well as patchy airspace opacity in the adjacent lung parenchyma consistent with bronchopneumonia.   2.  Hyperinflation of the lungs and moderate mid and upper lung centrilobular emphysema.   3.  Moderate cardiomegaly and severe three-vessel coronary artery calcification.  4.  Enlargement central pulmonary arteries (MPA diameter 4.6 cm) consistent with chronic pulmonary arterial hypertension.      Echocardiogram Complete     Value    LVEF  55-60%    Narrative    [id number]  [id number]  [id number]  [id number]^FIDELINA^ELIZ[place] [address]     Name: ALEJO BROWN  MRN: [id number]  : 1945  Study Date: 2024 11:07 AM  Age: 79 yrs  Gender: Male  Patient Location: [facility]  Reason For Study: CHF  Ordering Physician: ELIZABETH KITCHEN  Performed By: NOHEMI     BSA: 2.3 m2  Height: 75 in  Weight: 214 lb  HR: 84  BP: 95/52  mmHg  ______________________________________________________________________________  Procedure  Complete Portable Echo Adult. Definity (NDC #43205-721) given intravenously.  ______________________________________________________________________________  Interpretation Summary     Left ventricular size, wall motion and function are normal. The ejection  fraction is 55-60%.  Flattened septum is consistent with RV pressure/volume overload.  The right ventricle is mild to moderately dilated.  Mildly decreased right ventricular systolic function  Right ventricular systolic pressure is elevated, consistent with moderate to  severe pulmonary hypertension.  There is moderate (2+) tricuspid regurgitation.  IVC diameter >2.1 cm collapsing <50% with sniff suggests a high RA pressure  estimated at 15 mmHg or greater.  Ascending Aorta dilatation is present.  ______________________________________________________________________________  Left Ventricle  Left ventricular size, wall motion and function are normal. The ejection  fraction is 55-60%. There is normal left ventricular wall thickness. Flattened  septum is consistent with RV pressure/volume overload.     Right Ventricle  The right ventricle is mild to moderately dilated. Mildly decreased right  ventricular systolic function. TAPSE is normal, which is consistent with  normal right ventricular systolic function. There is a pacemaker lead in the  right ventricle.     Atria  The left atrium is moderately dilated. The right atrium is severely dilated.  Pacer wire in right atrium.     Mitral Valve  There is mild to moderate mitral annular calcification. Mitral valve leaflets  appear normal. There is no evidence of mitral stenosis or clinically  significant mitral regurgitation.     Tricuspid Valve  Tricuspid valve leaflets appear normal. There is moderate (2+) tricuspid  regurgitation. Right ventricular systolic pressure is elevated, consistent  with moderate to severe  pulmonary hypertension.     Aortic Valve  The aortic valve is trileaflet with aortic valve sclerosis. No hemodynamically  significant valvular aortic stenosis.     Pulmonic Valve  The pulmonic valve is not well seen, but is grossly normal.     Vessels  The aorta root is normal. Ascending Aorta dilatation is present. IVC diameter  >2.1 cm collapsing <50% with sniff suggests a high RA pressure estimated at 15  mmHg or greater.     Pericardium  There is no pericardial effusion.     Rhythm  The rhythm was atrial fibrillation.  ______________________________________________________________________________  MMode/2D Measurements & Calculations     IVSd: 1.1 cm  LVIDd: 5.6 cm  LVIDs: 4.0 cm  LVPWd: 1.0 cm  FS: 28.7 %  LV mass(C)d: 238.1 grams  LV mass(C)dI: 105.4 grams/m2  Ao root diam: 3.0 cm  LA dimension: 5.0 cm  LA/Ao: 1.7  LVOT diam: 2.1 cm  LVOT area: 3.5 cm2  Ao root diam index Ht(cm/m): 1.6  Ao root diam index BSA (cm/m2): 1.3  RV Base: 4.8 cm  RWT: 0.37  TAPSE: 1.7 cm     Doppler Measurements & Calculations  MV E max goldy: 73.4 cm/sec  MV dec time: 0.23 sec  LV V1 max P.4 mmHg  LV V1 max: 58.3 cm/sec  LV V1 VTI: 9.5 cm     SV(LVOT): 33.0 ml  SI(LVOT): 14.6 ml/m2  PA acc time: 0.05 sec  TR max goldy: 372.0 cm/sec  TR max P.4 mmHg  RV S Goldy: 10.6 cm/sec  ______________________________________________________________________________  Report approved by: Nalini Orantes 2024 01:34 PM        *Note: Due to a large number of results and/or encounters for the requested time period, some results have not been displayed. A complete set of results can be found in Results Review.     Discharge Medications   Discharge Medication List as of 3/9/2024  9:01 PM        CONTINUE these medications which have NOT CHANGED    Details   acetaminophen (TYLENOL) 325 MG tablet Take 650 mg by mouth 2 times daily as needed, Historical      albuterol (PROAIR HFA/PROVENTIL HFA/VENTOLIN HFA) 108 (90 Base) MCG/ACT inhaler  "INHALE 2 PUFFS INTO THE LUNGS EVERY 4 HOURS AS NEEDED FOR SHORTNESS OF BREATH OR WHEEZING, Disp-18 g, R-2, E-PrescribePharmacy may dispense brand covered by insurance (Proair, or proventil or ventolin or generic albuterol inhaler)      Ascorbic Acid (VITAMIN C) 500 MG CAPS Take 1,000 mg by mouth daily, Historical      aspirin (ASA) 81 MG EC tablet Take 1 tablet (81 mg) by mouth daily, Disp-90 tablet, R-0, E-Prescribe      atorvastatin (LIPITOR) 40 MG tablet Take 40 mg by mouth every evening, Historical      budesonide-formoterol (SYMBICORT) 160-4.5 MCG/ACT Inhaler Inhale 2 puffs into the lungs 2 times daily, Disp-10.2 g, R-11, E-Prescribe      bumetanide (BUMEX) 2 MG tablet Take 1 tablet (2 mg) by mouth 2 times daily, Disp-180 tablet, R-3, E-Prescribe      co-enzyme Q-10 100 MG CAPS capsule Take 2 capsules (200 mg) by mouth daily, Disp-2 capsule, Historical      empagliflozin (JARDIANCE) 10 MG TABS tablet Take 1 tablet (10 mg) by mouth daily, Disp-90 tablet, R-3, E-Prescribe      fish oil-omega-3 fatty acids 1000 MG capsule Take 1 g by mouth 2 times daily, Historical      FLUoxetine (PROZAC) 20 MG capsule TAKE 1 CAPSULE BY MOUTH EVERY DAY, Historical      MAG64 64 MG TBEC CR tablet TAKE 2 TABLETS BY MOUTH EVERY DAY, Disp-180 tablet, R-1, E-Prescribe      metolazone (ZAROXOLYN) 5 MG tablet Take 1 tablet (5 mg) by mouth daily, Disp-10 tablet, R-0, E-PrescribeTake 5mg 30 min before bumex dose.      metoprolol succinate ER (TOPROL XL) 25 MG 24 hr tablet Take 1 tablet (25 mg) by mouth daily, Disp-30 tablet, R-11, E-Prescribe      multivitamin, therapeutic (THERA-VIT) TABS tablet Take 1 tablet by mouth daily, Historical      nitroGLYcerin (NITROSTAT) 0.4 MG sublingual tablet One tablet under the tongue every 5 minutes if needed for chest pain. May repeat every 5 minutes for a maximum of 3 doses in 15 minutes\", Disp-25 tablet, R-3, E-Prescribe      polyethylene glycol (MIRALAX) 17 GM/Dose powder Take 17 g by mouth daily , " Historical      potassium chloride ER (K-TAB) 20 MEQ CR tablet Take 4 tablets (80 mEq) by mouth daily, Disp-60 tablet, R-0, E-Prescribetake 80 mEq potassium chloride supplementation today and repeat labs tomorrow.      sodium chloride (OCEAN) 0.65 % nasal spray Spray 2 sprays in nostril 4 times daily as needed, Historical      tiotropium (SPIRIVA) 18 MCG inhaled capsule Inhale 1 capsule (18 mcg) into the lungs daily, Disp-30 capsule, R-11, E-Prescribe      vitamin D2 (ERGOCALCIFEROL) 91467 units (1250 mcg) capsule Take 50,000 Units by mouth twice a week On Sunday and Wednesday, Historical      warfarin ANTICOAGULANT (COUMADIN) 2.5 MG tablet Take 1 tablet (2.5 mg) daily or as directed by the INR clinic, Disp-110 tablet, R-0, E-Prescribe      OXYGEN-HELIUM IN 4-5 L , Historical           Allergies   Allergies   Allergen Reactions    Adhesive Tape Other (See Comments)     ADHESIVE TAPE; SKIN IRRITATION; Skin pulled off with foam tape      Amiodarone      ADVERSE REACTION.  Sunlight sensitivity.    Lisinopril

## 2024-03-10 NOTE — PROGRESS NOTES
"Initiated BIPAP treatment per MD order, SATs of 82% while on 100% 55L HFNC. Tolerating current bipap settings. No further respiratory suggestions at this time. Will continue to monitor and assess per [facility] protocol.    12/8, 14, 70%    /84   Pulse 99   Temp 99.7  F (37.6  C) (Axillary)   Resp 29   Ht 1.905 m (6' 3\")   SpO2 95%   BMI 26.80 kg/m      Thiago Duffy RT on 3/10/2024 at 5:45 AM     "

## 2024-03-10 NOTE — CONSULTS
"Pulmonary Hypertension Consult Note    Service Date: March 10, 2024    RE:  Buck Sinclair   MRN:  [id number]  :  1945      Dear Colleagues:    Thank you for allowing me to care for Buck Sinclair at the [facility]. Mr. 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     He was hospitalized at [facility] from 3/8-3/10/24. He was diuresed and it was thought that the patient developed CHRISSY during the hospitalization due to overdiuresis.    He is a patient of my colleague, Dr. Morales, who he last saw in . I also met the patient in  when I was in my fellowship training working with Dr. Morales. Patient is a retired . He was previously in the INCREASE inhaled Tyvaso study, which was discontinued by the sponsor.    Patient is very hard of hearing. Wife, Neela, was at bedside.. Per patient's daughter, Shahrzad, patient was his usual self prior to admission and for a long time, he has had dyspnea ambulating to the bathroom around the home. Shahrzad notes that patient would suffer with a prolonged hospital stay and patient would want to go home as soon as possible. Additionally, Shahrzad did not want any members of the healthcare team potentially causing the patient to \"lose hope\".      PAST MEDICAL HISTORY:  Past Medical History:   Diagnosis Date    Anemia     Asthma without status asthmaticus 2021    Atrial fibrillation (H)     BPH (benign prostatic " hyperplasia)     Cardiomyopathy (H)     CKD (chronic kidney disease) stage 3, GFR 30-59 ml/min (H) 05/24/2023    Congestive heart failure (H)     COPD, group B, by GOLD 2017 classification (H)     Coronary artery disease due to calcified coronary lesion     Dyslipidemia, goal LDL below 70     Essential hypertension     Heart failure with reduced ejection fraction (H) 08/17/2023    Hemoptysis 10/04/2017    History of transfusion     Hyperlipidemia     Persistent atrial fibrillation (H)     Pneumonia of left lower lobe due to infectious organism 10/04/2017    Pulmonary hypertension (H)     Skin cancer of trunk     Status post catheter ablation of atrial fibrillation 06/07/2017    PVI 4-2011 (Cryo/PVI + roof line + CTI line) Re-do PVI 7-2011 (RFA/PVI + CFE + VIDYA + confirmed CTI line)    Ventricular tachycardia (H)        PAST SURGICAL HISTORY:  Past Surgical History:   Procedure Laterality Date    CARDIAC DEFIBRILLATOR PLACEMENT      CARDIOVERSION  07/11/2018    CARDIOVERSION  07/11/2018    CARDIOVERSION  11/19/2021    COLONOSCOPY N/A 04/28/2017    CORONARY ANGIOGRAPHY ADULT ORDER      CV CORONARY ANGIOGRAM N/A 09/20/2017    CV CORONARY ANGIOGRAM N/A 01/24/2022    CV LEFT HEART CATH N/A 01/24/2022    CV RIGHT AND LEFT HEART CATH N/A 01/24/2022    EP ICD GENERATOR REPLACEMENT DUAL N/A 10/28/2022    EP ICD INSERT      FRACTURE SURGERY Left     HEART CATH, ANGIOPLASTY      IMPLANT AUTOMATIC IMPLANTABLE CARDIOVERTER DEFIBRILLATOR      INGUINAL HERNIA REPAIR Left 1967    INSERT / REPLACE / REMOVE PACEMAKER      IR MISCELLANEOUS PROCEDURE  04/30/2014    OTHER SURGICAL HISTORY      MD ABLATE HEART DYSRHYTHM FOCUS  04/2011    MD ABLATE HEART DYSRHYTHM FOCUS  07/2011    TOTAL SHOULDER REPLACEMENT Right 03/03/2016    WRIST SURGERY Left     ZZC MYERS W/O FACETEC FORAMOT/DSKC 1/2 VRT SEG, CERVICAL         FAMILY HISTORY:  Family History   Problem Relation Age of Onset    Cancer Mother         leukemia    Cancer Father          bladder    Cancer Sister         breast with lung met.    Aneurysm Sister     CABG Brother     CABG Brother     Valvular heart disease Brother         valve replacement       SOCIAL HISTORY:  Social History     Socioeconomic History    Marital status:      Spouse name: Not on file    Number of children: Not on file    Years of education: Not on file    Highest education level: Not on file   Occupational History    Not on file   Tobacco Use    Smoking status: Former     Packs/day: 1.00     Years: 4.00     Additional pack years: 0.00     Total pack years: 4.00     Types: Cigarettes     Quit date: 1968     Years since quittin.2    Smokeless tobacco: Never   Vaping Use    Vaping Use: Never used   Substance and Sexual Activity    Alcohol use: Yes     Alcohol/week: 2.0 standard drinks of alcohol     Comment: Alcoholic Drinks/day: 1 beer per week    Drug use: No    Sexual activity: Yes     Partners: Female     Birth control/protection: Post-menopausal   Other Topics Concern    Parent/sibling w/ CABG, MI or angioplasty before 65F 55M? Not Asked   Social History Narrative    Preloaded 2013     Social Determinants of Health     Financial Resource Strain: Not on file   Food Insecurity: Not on file   Transportation Needs: Not on file   Physical Activity: Not on file   Stress: Not on file   Social Connections: Not on file   Interpersonal Safety: Not on file   Housing Stability: Not on file       CURRENT MEDICATIONS:  Current Facility-Administered Medications   Medication    acetaminophen (TYLENOL) tablet 650 mg    albuterol (PROVENTIL HFA/VENTOLIN HFA) inhaler    aspirin EC tablet 81 mg    [Held by provider] atorvastatin (LIPITOR) tablet 40 mg    calcium carbonate (TUMS) chewable tablet 1,000 mg    glucose gel 15-30 g    Or    dextrose 50 % injection 25-50 mL    Or    glucagon injection 1 mg    empagliflozin (JARDIANCE) tablet 10 mg    FLUoxetine (PROzac) capsule 20 mg    fluticasone (ARNUITY ELLIPTA) 100  "MCG/ACT inhaler 1 puff    fluticasone-vilanterol (BREO ELLIPTA) 200-25 MCG/ACT inhaler 1 puff    fluticasone-vilanterol (BREO ELLIPTA) 200-25 MCG/ACT inhaler 1 puff    ipratropium - albuterol 0.5 mg/2.5 mg/3 mL (DUONEB) neb solution 3 mL    lidocaine (LMX4) cream    lidocaine 1 % 0.1-1 mL    methylPREDNISolone sodium succinate (SOLU-MEDROL) injection 40 mg    metoprolol succinate ER (TOPROL XL) 24 hr tablet 25 mg    nitroGLYcerin (NITROSTAT) sublingual tablet 0.4 mg    ondansetron (ZOFRAN ODT) ODT tab 4 mg    Or    ondansetron (ZOFRAN) injection 4 mg    Patient is already receiving anticoagulation with heparin, enoxaparin (LOVENOX), warfarin (COUMADIN)  or other anticoagulant medication    Patient is already receiving mechanical prophylaxis    polyethylene glycol (MIRALAX) Packet 17 g    polyethylene glycol (MIRALAX) Packet 17 g    prochlorperazine (COMPAZINE) injection 5 mg    Or    prochlorperazine (COMPAZINE) tablet 5 mg    Or    prochlorperazine (COMPAZINE) suppository 12.5 mg    senna-docusate (SENOKOT-S/PERICOLACE) 8.6-50 MG per tablet 1 tablet    Or    senna-docusate (SENOKOT-S/PERICOLACE) 8.6-50 MG per tablet 2 tablet    sodium chloride (OCEAN) 0.65 % nasal spray 2 spray    sodium chloride (PF) 0.9% PF flush 3 mL    sodium chloride (PF) 0.9% PF flush 3 mL    umeclidinium (INCRUSE ELLIPTA) 62.5 MCG/ACT inhaler 1 puff    vitamin C (ASCORBIC ACID) tablet 1,000 mg    [START ON 3/11/2024] vitamin D2 (ERGOCALCIFEROL) 27017 units (1250 mcg) capsule 50,000 Units    warfarin ANTICOAGULANT (COUMADIN) tablet 2.5 mg    Warfarin Dose Required Daily - Pharmacist Managed       EXAM:  BP (!) 86/65 (BP Location: Left arm)   Pulse 74   Temp 98.7  F (37.1  C) (Axillary)   Resp 24   Ht 1.905 m (6' 3\")   SpO2 92%   BMI 26.80 kg/m    General: sitting upright in chair and appears uncomfortable  Head: normocephalic, atraumatic  Eyes: anicteric sclera, EOMI  Heart: regular, normal S1, loud P2, no murmur, gallop, rub, estimated " JVP 14 cm, faint radial pulses  Lungs: diminished breath sounds, on HFNC  Abdomen: soft, non-tender, bowel sounds present, no hepatosplenomegaly  Extremities: no lower extremity edema  Neurological: hard of hearing    Labs:  Recent Results (from the past 24 hour(s))   Potassium    Collection Time: 03/09/24  7:48 PM   Result Value Ref Range    Potassium 3.3 (L) 3.4 - 5.3 mmol/L   Glucose by meter    Collection Time: 03/09/24 10:11 PM   Result Value Ref Range    GLUCOSE BY METER POCT 155 (H) 70 - 99 mg/dL   Asymptomatic COVID-19 Virus (Coronavirus) by PCR Nasopharyngeal    Collection Time: 03/09/24 10:37 PM    Specimen: Nasopharyngeal; Swab   Result Value Ref Range    SARS CoV2 PCR Negative Negative   EKG 12-lead, complete    Collection Time: 03/09/24 10:46 PM   Result Value Ref Range    Systolic Blood Pressure  mmHg    Diastolic Blood Pressure  mmHg    Ventricular Rate 80 BPM    Atrial Rate  BPM    AL Interval  ms    QRS Duration 130 ms     ms    QTc 555 ms    P Axis  degrees    R AXIS 125 degrees    T Axis -35 degrees    Interpretation ECG       Atrial fibrillation  Right bundle branch block  T wave abnormality, consider lateral ischemia  Abnormal ECG  When compared with ECG of 08-MAR-2024 13:30, (unconfirmed)  Sinus rhythm is no longer with ventricular escape complexes  Nonspecific T wave abnormality has replaced inverted T waves in Inferior leads     Comprehensive metabolic panel    Collection Time: 03/09/24 11:15 PM   Result Value Ref Range    Sodium 140 135 - 145 mmol/L    Potassium 3.3 (L) 3.4 - 5.3 mmol/L    Carbon Dioxide (CO2) 31 (H) 22 - 29 mmol/L    Anion Gap 15 7 - 15 mmol/L    Urea Nitrogen 63.5 (H) 8.0 - 23.0 mg/dL    Creatinine 2.48 (H) 0.67 - 1.17 mg/dL    GFR Estimate 26 (L) >60 mL/min/1.73m2    Calcium 10.2 8.8 - 10.2 mg/dL    Chloride 94 (L) 98 - 107 mmol/L    Glucose 135 (H) 70 - 99 mg/dL    Alkaline Phosphatase 77 40 - 150 U/L    AST 33 0 - 45 U/L    ALT 13 0 - 70 U/L    Protein Total 7.5  6.4 - 8.3 g/dL    Albumin 4.3 3.5 - 5.2 g/dL    Bilirubin Total 1.7 (H) <=1.2 mg/dL   Magnesium    Collection Time: 03/09/24 11:15 PM   Result Value Ref Range    Magnesium 2.3 1.7 - 2.3 mg/dL   Phosphorus    Collection Time: 03/09/24 11:15 PM   Result Value Ref Range    Phosphorus 2.4 (L) 2.5 - 4.5 mg/dL   Lactic acid whole blood    Collection Time: 03/09/24 11:15 PM   Result Value Ref Range    Lactic Acid 3.2 (H) 0.7 - 2.0 mmol/L   INR    Collection Time: 03/09/24 11:15 PM   Result Value Ref Range    INR 3.08 (H) 0.85 - 1.15   CK total    Collection Time: 03/09/24 11:15 PM   Result Value Ref Range    CK 76 39 - 308 U/L   Procalcitonin    Collection Time: 03/09/24 11:15 PM   Result Value Ref Range    Procalcitonin 0.12 <0.50 ng/mL   Troponin T, High Sensitivity    Collection Time: 03/09/24 11:15 PM   Result Value Ref Range    Troponin T, High Sensitivity 61 (H) <=22 ng/L   Partial thromboplastin time    Collection Time: 03/09/24 11:15 PM   Result Value Ref Range    aPTT 40 (H) 22 - 38 Seconds   Adult Type and Screen    Collection Time: 03/09/24 11:15 PM   Result Value Ref Range    ABO/RH(D) AB POS     Antibody Screen Negative Negative    SPECIMEN EXPIRATION DATE [id number]    TSH with free T4 reflex    Collection Time: 03/09/24 11:15 PM   Result Value Ref Range    TSH 1.35 0.30 - 4.20 uIU/mL   Rheumatoid factor    Collection Time: 03/09/24 11:15 PM   Result Value Ref Range    Rheumatoid Factor <10 <14 IU/mL   Hepatits C antibody    Collection Time: 03/09/24 11:15 PM   Result Value Ref Range    Hepatitis C Antibody Nonreactive Nonreactive   Iron and iron binding capacity    Collection Time: 03/09/24 11:15 PM   Result Value Ref Range    Iron 63 61 - 157 ug/dL    Iron Binding Capacity 301 240 - 430 ug/dL    Iron Sat Index 21 15 - 46 %   Blood gas arterial    Collection Time: 03/10/24  6:04 AM   Result Value Ref Range    pH Arterial 7.51 (H) 7.35 - 7.45    pCO2 Arterial 38 35 - 45 mm Hg    pO2 Arterial 91 80 - 105  mm Hg    FIO2 80     Bicarbonate Arterial 31 (H) 21 - 28 mmol/L    Base Excess/Deficit Arterial 7.1 (H) -3.0 - 3.0 mmol/L    Howie's Test Yes     Oxyhemoglobin Arterial 97 92 - 100 %    O2 Sat, Arterial 97.5 (H) 95.0 - 96.0 %   INR    Collection Time: 03/10/24  6:23 AM   Result Value Ref Range    INR 3.05 (H) 0.85 - 1.15   Partial thromboplastin time    Collection Time: 03/10/24  6:23 AM   Result Value Ref Range    aPTT 38 22 - 38 Seconds   HIV Antigen Antibody Combo Cascade    Collection Time: 03/10/24  6:23 AM   Result Value Ref Range    HIV Antigen Antibody Combo Nonreactive Nonreactive   Hepatitis B core antibody    Collection Time: 03/10/24  6:23 AM   Result Value Ref Range    Hepatitis B Core Antibody Total Nonreactive Nonreactive   Hepatitis B Surface Antibody    Collection Time: 03/10/24  6:23 AM   Result Value Ref Range    Hepatitis B Surface Antibody Nonreactive     Hepatitis B Surface Antibody Instrument Value <3.50 <8.5 m[IU]/mL   Potassium    Collection Time: 03/10/24  6:23 AM   Result Value Ref Range    Potassium 3.8 3.4 - 5.3 mmol/L   Phosphorus    Collection Time: 03/10/24  6:23 AM   Result Value Ref Range    Phosphorus 2.4 (L) 2.5 - 4.5 mg/dL   CBC with platelets    Collection Time: 03/10/24  6:23 AM   Result Value Ref Range    WBC Count 10.7 4.0 - 11.0 10e3/uL    RBC Count 3.91 (L) 4.40 - 5.90 10e6/uL    Hemoglobin 12.9 (L) 13.3 - 17.7 g/dL    Hematocrit 39.4 (L) 40.0 - 53.0 %     (H) 78 - 100 fL    MCH 33.0 26.5 - 33.0 pg    MCHC 32.7 31.5 - 36.5 g/dL    RDW 15.9 (H) 10.0 - 15.0 %    Platelet Count 152 150 - 450 10e3/uL   Heparin Unfractionated Anti Xa Level    Collection Time: 03/10/24  9:39 AM   Result Value Ref Range    Anti Xa Unfractionated Heparin <0.10 For Reference Range, See Comment IU/mL   Echo Limited with Bubble Study    Collection Time: 03/10/24 12:11 PM   Result Value Ref Range    LVEF  55-60%    Lactic acid whole blood    Collection Time: 03/10/24 12:54 PM   Result Value Ref  Range    Lactic Acid 1.9 0.7 - 2.0 mmol/L   Troponin T, High Sensitivity    Collection Time: 03/10/24 12:54 PM   Result Value Ref Range    Troponin T, High Sensitivity 47 (H) <=22 ng/L         Echocardiogram 3/10/2024:  Global and regional left ventricular function is normal with an EF of 55-60%.  Severe right ventricular dilation is present.  Global right ventricular function is moderately reduced.  The right ventricular systolic pressure is 30mmHg above the right atrial  pressure.  Dilation of the inferior vena cava is present with abnormal respiratory  variation in diameter.  No pericardial effusion is present.  Pulmonary hypertension is present.     This study was compared with the study from 3/9/2024 .  PAP appear lower today otherwise no significant changes noted.     There was no shunt at the atrial septal level as assessed by color Doppler and  agitated saline bubble study at rest and with Valsalva maneuver.    PFTs 10/27/2022:  FVC: 68%  FEV1: 57%  FEV1/FVC: 61%  T%  DLCOunc: 34%    RHC 2022:  RA: 10  RV: 55/14  PA: 66/ (37)  PCWP: 14  TD CO: 6.4  PVR: 4.0    NM Lung Perfusion Scan 22:  FINDINGS: Normal perfusion to both lungs. No segmental perfusion defects.     6MWT 19: Ambulated 320 meters, lowest saturation 87% on room air     6MWT 2/15/22: Ambulated 232 meters (reduced from 320 meters in 2019), lowest saturation 81% on 6 L/min O2       Assessment and Plan:     Buck Sinclair is a very pleasant 79 year old male with a history of COPD, chronic hypoxemic respiratory failure on 6-8 L/min O2, CAD status post PCI x 3 to proximal third to distal LAD on 2017, NICM with improved LVEF, paroxysmal atrial fibrillation status post multiple DCCV and complex catheter ablations, status post Medtronic dual-chamber implantable cardiac defibrillation in 2014 for primary prevention, and previous diagnosis of 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 He is a patient of my colleague, Dr. Morales.    Discussed with family that we need to obtain invasive hemodynamics to determine severity of his PH. We can obtain a RHC with vasodilator study and with shunt run per preference of primary team. We will obtain a V/Q scan to evaluate for PE since family notes that he had acute decompensation in respiratory status and his elevated Cr is a relative contraindication to CTA. I suspect that he will have elevated pressures on RHC and that our evaluation will be consistent with Group 3 PH due to COPD. I explained to patient's family that we do not currently have any therapies approved for Group 3 PH due to COPD and that our options would be limited. Family is not amenable to discussion of palliative care/comfort care/hospice.      It was a pleasure seeing Buck Sinclair at the [facility]. Please contact me with any questions or concerns that you may have.      I spent a total of 140 minutes on the date of service evaluating this patient which included face to face discussion, updating family members, performing a physical exam, reviewing of the chart to gain information from other providers to obtain further history, personally reviewing prior lab and imaging results, ordering tests and/or medications, and documenting clinical information in the electronic health record.         Sincerely,      Michelle Villanueva MD, PhD   of Medicine  [facility]  Cardiovascular Division  [facility]

## 2024-03-10 NOTE — PLAN OF CARE
Goal Outcome Evaluation:      Plan of Care Reviewed With: patient    Overall Patient Progress: decliningOverall Patient Progress: declining    Outcome Evaluation: Increase in O2 requirements, possible COPD exacerbation. Steroids and nebs ordered. CXR completed. BiPAP this am 12/8, 80%. K and Phos replaced. R heart cath planned for Monday.    Admitted/transferred from: [facility]  Reason for admission/transfer: R heart cath, management of pulmonary HTN   Patient status upon admission/transfer: HFNC 65%, 45 lpm. Afib 80-90's. BP WNL. Afebrile.   Interventions: Increase in O2 requirements overnight. BiPAP initiated this morning 12/8, 80% to maintain SPO2>88%. CXR and ABG done this morning. C/f COPD exacerbation, steroids and nebs ordered. Electrolytes, LA, and troponin reviewed with cards moonlighter. K and Phos replaced.   Plan: VQ scan and ECHO with bubble ordered. Plan for R heart cath on Monday. Heparin gtt ordered to start at 0930.   2 RN skin assessment: completed by FENG Mercado and FENG Amaya  Sexual Orientation and Gender Identity (SOGI) smartfom completed: Not Done  Result of skin assessment and interventions/actions: BLE scabs, redness to bridge of nose, bruising to L arm and above L eyebrow, blanching redness to sacrum (mepilex)  Height, weight, drug calc weight: Done  Patient belongings (see Flowsheet - Adult Profile for details): hearing aids, glasses, cell phone, chargers x2, shoes, clothes  MDRO education (if applicable):  N/A

## 2024-03-10 NOTE — PHARMACY-ADMISSION MEDICATION HISTORY
Admission medication history completed at [facility]. Please see Pharmacist Admission Medication History note from 3/8/2024.     See EPIC MAR for last doses prior to transfer.    Miesha Sigala, KashifD

## 2024-03-11 NOTE — PROGRESS NOTES
[facility]  Cardiology Heart Failure Service (Cards 2) H&P Note    Patient Name: Buck Sinclair    Medical Record Number: [id number]    YOB: 1945  PCP: Vivek Guerrero    Admit Date/Time: 3/9/2024 10:02 PM   2    Assessment & Plan   79 y.o. male patient with PMH chronic RS failure on 6-8L NC, PHTN, CKD IIIA, CAD s/p CANDELARIA x3 to the qzwobqfb-id-mfotns LAD (9/2017), longstanding persistent AF s/p multiple electrical cardioversions and extensive catheter ablations x2 in 2011 (on coumadin), ischemic cardiomyopathy s/p Medtronic dual-chamber PPM in 1999 replaced with a Medtronic dual-chamber implantable cardiac defibrillator on 6/11/2014, MDD, HLD who was admitted on 3/8/2024 with acute on chronic RS failure and acute renal failure with concerns for possible over diuresis who was transferred for a RHC.    Neuro:   #Depression    Plan:  -Continue home prozac     CV:  #Ischemic cardiomyopathy with recovered LV function and mildly reduced RV function  Longstanding persistent AF s/p multiple electrical cardioversions and extensive catheter ablations x2 in 2011 (on coumadin)  #Essential hypertension.  #CAD - LAD PCI/CANDELARIA 9/20/2017   ECHO 5/23 EF 55-60% but severe pulmonary HTN    Plan:   -Continue aspirin, statin    -Hold coumadin in anticipation of RHC, stop heparin gtt given INR is 3.0  - RHC on Monday with vasodilatory testing  - Follow-up PHTN labs  - TTE with bubble study without a shunt run    Pulm:  #COPD GOLD E; brief history of tobacco use and inhalational exposure working as a .   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  Acute on chronic hypoxic and hypercarbic respiratory failure.    Plan:   - Continue PTA Spiriva, Symbicort, albuterol  - Supplemental oxygen to keep SpO2>89%  - Low threshold to manage for possible COPD exacerbation  - Consulted Pulm, appreciare rec's   - Increased Methylpred to 125 mg Q8hrs   - Full viral  panel   - Sputum cultures; returned positive for GPCs; added empiric coverage, pending clinical course and pulm rec's    GI/Nutrition:  NO active issues    Plan:  -Cardiac diet  -NPO midnight for RHC with vasodilatory study tomorrow; INR high; will reorder when appropriate     Renal:  #CHRISSY on CKD3a baseline Cr 1.63 11/23 and slow climb since 3/4 from 2/27 to 3.03 with increase in diuresis   #Hypokalemia    Plan:   -avoid nephrotoxic meds  -Consider renal ultrasound  -Hold off on diuresis for now, appears euvolemic    ID:   No active concern for infection    Plan:     Heme:   -No active issue     Plan:   -Transfuse with Hgb goal >7  -Iron panel    Endo:  DM2    Plan:  -May need sliding scale insulin  -Keep BG <180     MSK/Rheum:    - No active issues    DVT Prophylaxis: Heparin gtt  Disposition:   - Unit 4c  Code Status:   No CPR- Do NOT Intubate     Thank you for allowing me to care for this patient, please don't hesitate to contact me with any questions regarding this plan.   Patient was discussed and evaluated with Velia Adams MD, attending physician, who agrees with the assessment and plan above.    Douglas Portillo MD  Cardiology Fellow      Chief Complaint   Acute hypoxic respiratory failure      History of Present Illness   79 y.o. male patient with PMH chronic RS failure on 6-8L NC, PHTN, CKD IIIA, CAD s/p CANDELARIA x3 to the tirlodzz-dd-zfnbta LAD (9/2017), longstanding persistent AF s/p multiple electrical cardioversions and extensive catheter ablations x2 in 2011 (on coumadin), ischemic cardiomyopathy s/p Medtronic dual-chamber PPM in 1999 replaced with a Medtronic dual-chamber implantable cardiac defibrillator on 6/11/2014, MDD, HLD who was admitted on 3/8/2024 with acute on chronic RS failure and acute renal failure with concerns for possible over diuresis who was transferred for a RHC. Patient notes he has been having increased SOB with increased aggressive outpatient diuresis, he had a worsening  creatinine causing him to present to the ED. He received 500cc of IV fluids in the ED. His home diuretics were held due to his CHRISSY. As patient had increased oxygen requirements to 70% fio2 on 50LPM pulm saw him and recommended transfer to the [place] for RHC.    Past Medical History    I have reviewed this patient's medical history and updated it with pertinent information if needed.   Past Medical History:   Diagnosis Date    Anemia     Asthma without status asthmaticus 05/05/2021    Atrial fibrillation (H)     BPH (benign prostatic hyperplasia)     Cardiomyopathy (H)     CKD (chronic kidney disease) stage 3, GFR 30-59 ml/min (H) 05/24/2023    Congestive heart failure (H)     COPD, group B, by GOLD 2017 classification (H)     Coronary artery disease due to calcified coronary lesion     Dyslipidemia, goal LDL below 70     Essential hypertension     Heart failure with reduced ejection fraction (H) 08/17/2023    Hemoptysis 10/04/2017    History of transfusion     Hyperlipidemia     Persistent atrial fibrillation (H)     Pneumonia of left lower lobe due to infectious organism 10/04/2017    Pulmonary hypertension (H)     Skin cancer of trunk     Status post catheter ablation of atrial fibrillation 06/07/2017    PVI 4-2011 (Cryo/PVI + roof line + CTI line) Re-do PVI 7-2011 (RFA/PVI + CFE + VIDYA + confirmed CTI line)    Ventricular tachycardia (H)        Past Surgical History   I have reviewed this patient's surgical history and updated it with pertinent information if needed.  Past Surgical History:   Procedure Laterality Date    CARDIAC DEFIBRILLATOR PLACEMENT      CARDIOVERSION  07/11/2018    x20, last 2/12/15, 10/2015, 11/18/16, 6/16/17 by Lauren Foster CNP    CARDIOVERSION  07/11/2018    CARDIOVERSION  11/19/2021    COLONOSCOPY N/A 04/28/2017    Procedure: COLONOSCOPY with 2 ascending polyps and 1 transverse polyp;  Surgeon: Jose Whittington MD;  Location: [facility];  Service:     CORONARY ANGIOGRAPHY ADULT  ORDER      CV CORONARY ANGIOGRAM N/A 09/20/2017    Procedure: Coronary Angiogram;  Surgeon: Sergio Cervantes MD;  Location: [facility] Cath Lab;  Service:     CV CORONARY ANGIOGRAM N/A 01/24/2022    Procedure: Coronary Angiogram;  Surgeon: Christi Saunders MD;  Location: [facility] CATH LAB CV    CV LEFT HEART CATH N/A 01/24/2022    Procedure: Left Heart Cath;  Surgeon: Christi Saunders MD;  Location: [facility] CATH LAB CV    CV RIGHT AND LEFT HEART CATH N/A 01/24/2022    Procedure: Right and Left Heart Catherization;  Surgeon: Christi Saunders MD;  Location: [facility] CATH [facility] CV    EP ICD GENERATOR REPLACEMENT DUAL N/A 10/28/2022    Procedure: Implantable Cardioverter Defibrillator Generator Replacement Dual;  Surgeon: Elo Collins MD;  Location: Health system LAB CV    EP ICD INSERT      FRACTURE SURGERY Left     wrist    HEART CATH, ANGIOPLASTY      IMPLANT AUTOMATIC IMPLANTABLE CARDIOVERTER DEFIBRILLATOR      INGUINAL HERNIA REPAIR Left 1967    while in the [employer] in [employer] after 13 month in [place]    INSERT / REPLACE / REMOVE PACEMAKER      IR MISCELLANEOUS PROCEDURE  04/30/2014    OTHER SURGICAL HISTORY      left hand surgery---tendon repair    OR ABLATE HEART DYSRHYTHM FOCUS  04/2011    Catheter Ablation Atrial Fibrillation PVI Apr 2011 (Cryo+RF-PVI + roof line + CTI line)    OR ABLATE HEART DYSRHYTHM FOCUS  07/2011    Re-do PVI Jul 2011 (RFA-PVI + CFE + VIDYA + confirmation of CTI line)    TOTAL SHOULDER REPLACEMENT Right 03/03/2016    Dr. Abernathy of [facility]    WRIST SURGERY Left     ZZC MYERS W/O FACETEC FORAMOT/DSKC 1/2 VRT SEG, CERVICAL      Laminectomy Lumbar;  Recorded: 03/09/2012;           Allergies   Allergies   Allergen Reactions    Adhesive Tape Other (See Comments)     ADHESIVE TAPE; SKIN IRRITATION; Skin pulled off with foam tape      Amiodarone      ADVERSE REACTION.  Sunlight sensitivity.    Lisinopril        Current medications   Current Facility-Administered Medications    Medication    acetaminophen (TYLENOL) tablet 650 mg    albuterol (PROVENTIL HFA/VENTOLIN HFA) inhaler    aspirin EC tablet 81 mg    [Held by provider] atorvastatin (LIPITOR) tablet 40 mg    calcium carbonate (TUMS) chewable tablet 1,000 mg    cefTRIAXone (ROCEPHIN) 2 g vial to attach to  ml bag for ADULTS or NS 50 ml bag for PEDS    glucose gel 15-30 g    Or    dextrose 50 % injection 25-50 mL    Or    glucagon injection 1 mg    empagliflozin (JARDIANCE) tablet 10 mg    FLUoxetine (PROzac) capsule 20 mg    fluticasone (ARNUITY ELLIPTA) 100 MCG/ACT inhaler 1 puff    fluticasone-vilanterol (BREO ELLIPTA) 200-25 MCG/ACT inhaler 1 puff    fluticasone-vilanterol (BREO ELLIPTA) 200-25 MCG/ACT inhaler 1 puff    ipratropium - albuterol 0.5 mg/2.5 mg/3 mL (DUONEB) neb solution 3 mL    lidocaine (LMX4) cream    lidocaine 1 % 0.1-1 mL    methylPREDNISolone sodium succinate (solu-MEDROL) injection 125 mg    Followed by    [START ON 3/12/2024] methylPREDNISolone sodium succinate (solu-MEDROL) injection 62.5 mg    metoprolol succinate ER (TOPROL XL) 24 hr tablet 25 mg    nitroGLYcerin (NITROSTAT) sublingual tablet 0.4 mg    ondansetron (ZOFRAN ODT) ODT tab 4 mg    Or    ondansetron (ZOFRAN) injection 4 mg    Patient is already receiving anticoagulation with heparin, enoxaparin (LOVENOX), warfarin (COUMADIN)  or other anticoagulant medication    Patient is already receiving mechanical prophylaxis    polyethylene glycol (MIRALAX) Packet 17 g    polyethylene glycol (MIRALAX) Packet 17 g    prochlorperazine (COMPAZINE) injection 5 mg    Or    prochlorperazine (COMPAZINE) tablet 5 mg    Or    prochlorperazine (COMPAZINE) suppository 12.5 mg    senna-docusate (SENOKOT-S/PERICOLACE) 8.6-50 MG per tablet 1 tablet    Or    senna-docusate (SENOKOT-S/PERICOLACE) 8.6-50 MG per tablet 2 tablet    sodium chloride (OCEAN) 0.65 % nasal spray 2 spray    sodium chloride (PF) 0.9% PF flush 3 mL    sodium chloride (PF) 0.9% PF flush 3 mL     umeclidinium (INCRUSE ELLIPTA) 62.5 MCG/ACT inhaler 1 puff    [START ON 3/12/2024] vancomycin (VANCOCIN) 1,000 mg in 200 mL dextrose intermittent infusion    vancomycin (VANCOCIN) 2,250 mg in sodium chloride 0.9 % 500 mL intermittent infusion    vitamin C (ASCORBIC ACID) tablet 1,000 mg    vitamin D2 (ERGOCALCIFEROL) 32672 units (1250 mcg) capsule 50,000 Units       Medications Prior to Admission   Medication Sig Dispense Refill Last Dose    acetaminophen (TYLENOL) 325 MG tablet Take 650 mg by mouth 2 times daily as needed       albuterol (PROAIR HFA/PROVENTIL HFA/VENTOLIN HFA) 108 (90 Base) MCG/ACT inhaler INHALE 2 PUFFS INTO THE LUNGS EVERY 4 HOURS AS NEEDED FOR SHORTNESS OF BREATH OR WHEEZING 18 g 2     Ascorbic Acid (VITAMIN C) 500 MG CAPS Take 1,000 mg by mouth daily       aspirin (ASA) 81 MG EC tablet Take 1 tablet (81 mg) by mouth daily 90 tablet 0     atorvastatin (LIPITOR) 40 MG tablet Take 40 mg by mouth every evening       budesonide-formoterol (SYMBICORT) 160-4.5 MCG/ACT Inhaler Inhale 2 puffs into the lungs 2 times daily 10.2 g 11     bumetanide (BUMEX) 2 MG tablet Take 1 tablet (2 mg) by mouth 2 times daily (Patient taking differently: Take 2 mg by mouth 2 times daily Increase dose to 4mg twice daily starting 3/1 or as instructed by cardiology) 180 tablet 3     co-enzyme Q-10 100 MG CAPS capsule Take 2 capsules (200 mg) by mouth daily 2 capsule      empagliflozin (JARDIANCE) 10 MG TABS tablet Take 1 tablet (10 mg) by mouth daily 90 tablet 3     fish oil-omega-3 fatty acids 1000 MG capsule Take 1 g by mouth 2 times daily       FLUoxetine (PROZAC) 20 MG capsule TAKE 1 CAPSULE BY MOUTH EVERY DAY       MAG64 64 MG TBEC CR tablet TAKE 2 TABLETS BY MOUTH EVERY  tablet 1     metolazone (ZAROXOLYN) 5 MG tablet Take 1 tablet (5 mg) by mouth daily 10 tablet 0     metoprolol succinate ER (TOPROL XL) 25 MG 24 hr tablet Take 1 tablet (25 mg) by mouth daily 30 tablet 11     multivitamin, therapeutic  "(THERA-VIT) TABS tablet Take 1 tablet by mouth daily       nitroGLYcerin (NITROSTAT) 0.4 MG sublingual tablet One tablet under the tongue every 5 minutes if needed for chest pain. May repeat every 5 minutes for a maximum of 3 doses in 15 minutes\" 25 tablet 3     polyethylene glycol (MIRALAX) 17 GM/Dose powder Take 17 g by mouth daily        potassium chloride ER (K-TAB) 20 MEQ CR tablet Take 4 tablets (80 mEq) by mouth daily (Patient taking differently: Take 80 mEq by mouth once) 60 tablet 0     sodium chloride (OCEAN) 0.65 % nasal spray Spray 2 sprays in nostril 4 times daily as needed       tiotropium (SPIRIVA) 18 MCG inhaled capsule Inhale 1 capsule (18 mcg) into the lungs daily 30 capsule 11     vitamin D2 (ERGOCALCIFEROL) 69620 units (1250 mcg) capsule Take 50,000 Units by mouth twice a week On Sunday and Wednesday       warfarin ANTICOAGULANT (COUMADIN) 2.5 MG tablet Take 1 tablet (2.5 mg) daily or as directed by the INR clinic 110 tablet 0     OXYGEN-HELIUM IN 4-5 L           Social History   I have reviewed this patient's social history and updated it with pertinent information if needed. Buck Sinclair  reports that he quit smoking about 56 years ago. His smoking use included cigarettes. He has a 4 pack-year smoking history. He has never used smokeless tobacco. He reports current alcohol use of about 2.0 standard drinks of alcohol per week. He reports that he does not use drugs.    Family History   I have reviewed this patient's family history and updated it with pertinent information if needed.   Family History   Problem Relation Age of Onset    Cancer Mother         leukemia    Cancer Father         bladder    Cancer Sister         breast with lung met.    Aneurysm Sister     CABG Brother     CABG Brother     Valvular heart disease Brother         valve replacement       Review of Systems   Review of systems was not needed on this patient    Physical Exam   Temp: 97.6  F (36.4  C) Temp src: Axillary " BP: 108/66 Pulse: 89   Resp: 20 SpO2: 90 % O2 Device: High Flow Nasal Cannula (HFNC) Oxygen Delivery: 55 LPM    Vital Signs with Ranges  Temp:  [97.3  F (36.3  C)-98  F (36.7  C)] 97.6  F (36.4  C)  Pulse:  [] 89  Resp:  [13-28] 20  BP: ()/(57-86) 108/66  FiO2 (%):  [70 %-100 %] 100 %  SpO2:  [87 %-100 %] 90 %  215 lbs 6.23 oz    General appearance - Lying in bed; appears in no acute distress.  Head: Normocephalic and atraumatic.   Eyes: Pupils are equal, round, and reactive to light. Extraocular movement intact. No conjunctival pallor. No scleral icterus.  Neck: No JVD, bruit.  Cardiovascular: Regular rhythm. S1 and S2 auscultated. No murmurs, rub, or gallops.  Pulmonary/Chest: Normal resp effort on RA, speaking in full sentences. Clear breath sounds to auscultation bilaterally. No wheezes, crackles, or rhonchi. No chest tenderness.   Abdominal: Bowel sounds present. Soft. No distension. No rigidity, rebound or guarding. No organomegaly, hernias or palpable masses on deep palpation. No CVA tenderness.  Extremities: Warm, no cyanosis, 2+ distal pulses bilaterally. No edema.   Skin: Skin is warm and not diaphoretic. No rash, bruising, or erythema.  Neuro: Alert and oriented to person, place, and time. No focal neurological deficit.    Data   Data reviewed today:  I personally reviewed:        TTE: 03/09/2024   Left ventricular size, wall motion and function are normal. The ejection  fraction is 55-60%.  Flattened septum is consistent with RV pressure/volume overload.  The right ventricle is mild to moderately dilated.  Mildly decreased right ventricular systolic function  Right ventricular systolic pressure is elevated, consistent with moderate to  severe pulmonary hypertension.  There is moderate (2+) tricuspid regurgitation.  IVC diameter >2.1 cm collapsing <50% with sniff suggests a high RA pressure  estimated at 15 mmHg or greater.  Ascending Aorta dilatation is present.    TTE with shunt  study:  Interpretation Summary  Global and regional left ventricular function is normal with an EF of 55-60%.  Severe right ventricular dilation is present.  Global right ventricular function is moderately reduced.  The right ventricular systolic pressure is 30mmHg above the right atrial  pressure.  Dilation of the inferior vena cava is present with abnormal respiratory  variation in diameter.  No pericardial effusion is present.  Pulmonary hypertension is present.     This study was compared with the study from 3/9/2024 .  PAP appear lower today otherwise no significant changes noted.     There was no shunt at the atrial septal level as assessed by color Doppler and  agitated saline bubble study at rest and with Valsalva maneuver.    LHC: 1/24/22   Left Main  The vessel was visualized by selective angiography and is moderate in size. There was 0% vessel disease.  Left Anterior Descending  Previously placed Prox LAD to Mid LAD drug eluting stent is widely patent. Previous treatment took place >2 years ago. No thrombosis in the previous stent. No restenosis in the previous stent.  Left Circumflex  Third Obtuse Marginal Branch  3rd Mrg lesion is 30% stenosed.  Right Coronary Artery  Mid RCA lesion is 20% stenosed.    RHC: 1/24/22    LVEDP and PCWP are normal. The PA pressure was elevated at 66/24 mmHg with a mean pressure of 37 mmHg. See numbers below.   Shikha and TD cardiac outputs were both 5.7 l/min.   Sats on 2L NC oxygen: Ao 93%, PA 60%, RA 59%    VQ Scan 5/25/23:  Low probability of pulmonary embolism.     VQ scan 03/11/24  Findings:   The perfusion images demonstrate photopenia region in the right upper  chest is likely artifact from pacing device. Otherwise normal  perfusion study.                                                             Impression:  No pulmonary embolus.   I have personally reviewed the examination and initial interpretation  and I agree with the findings.    CT Chest at OSH:  IMPRESSION:    1.  Segments of cylindrical bronchiectasis with endobronchial mucosal thickening and secretions in the right middle lobe, lingula and both lower lobes as well as patchy airspace opacity in the adjacent lung parenchyma consistent with bronchopneumonia.   2.  Hyperinflation of the lungs and moderate mid and upper lung centrilobular emphysema.   3.  Moderate cardiomegaly and severe three-vessel coronary artery calcification.  4.  Enlargement central pulmonary arteries (MPA diameter 4.6 cm) consistent with chronic pulmonary arterial hypertension.     LABS Reviewed  Recent Labs   Lab 03/11/24  0617 03/10/24  0623 03/09/24  2315 03/09/24  2211 03/09/24  1358 03/09/24  0510 03/08/24  1340 03/08/24  1334   WBC 15.1* 10.7  --   --   --   --   --  8.3   HGB 11.8* 12.9*  --   --   --   --   --  13.2*    101*  --   --   --   --   --  100    152  --   --   --   --   --  176   INR 3.25* 3.05* 3.08*  --   --  2.82*   < >  --      --  140  --   --  139  --  137   POTASSIUM 3.4 3.8 3.3*  --    < > 2.7*   < > 2.3*   CHLORIDE 95*  --  94*  --   --  94*  --  88*   CO2 27  --  31*  --   --  33*  --  33*   BUN 68.5*  --  63.5*  --   --  66.5*  --  65.3*   CR 2.36*  --  2.48*  --   --  2.70*  --  3.03*   ANIONGAP 16*  --  15  --   --  12  --  16*   AMBIKA 9.7  --  10.2  --   --  9.4  --  9.9   *  --  135* 155*  --  116*  --  141*   ALBUMIN 3.9  --  4.3  --   --   --   --   --    PROTTOTAL 7.0  --  7.5  --   --   --   --   --    BILITOTAL 1.8*  --  1.7*  --   --   --   --   --    ALKPHOS 66  --  77  --   --   --   --   --    ALT 13  --  13  --   --   --   --   --    AST 32  --  33  --   --   --   --   --     < > = values in this interval not displayed.       IMAGES Reviewed    Recent Results (from the past 24 hour(s))   Cardiac Device Check - Remote (Standing ORD 5 count)   Result Value    Date Time Interrogation Session [id number]    Implantable Pulse Generator  Medtronic    Implantable Pulse  Generator Model [id number] Cobalt XT DR MRI    Implantable Pulse Generator Serial Number [id number]    Type Interrogation Session Remote Scheduled    Clinic Name [facility]    Implantable Pulse Generator Type Defibrillator    Implantable Pulse Generator Implant Date 20221028    Implantable Lead  Medtronic    Implantable Lead Model 5076 CapSureFix Novus    Implantable Lead Serial Number [id number]    Implantable Lead Implant Date 20140611    Implantable Lead Polarity Type Bipolar Lead    Implantable Lead Location Detail 1 APPENDAGE    Implantable Lead Location Right Atrium    Implantable Lead Connection Status Connected    Implantable Lead  Medtronic    Implantable Lead Model 6935M Sprint Quattro Secure S    Implantable Lead Serial Number [id number]    Implantable Lead Implant Date 20140611    Implantable Lead Polarity Type Tripolar Lead    Implantable Lead Location Detail 1 SEPTUM    Implantable Lead Location Right Ventricle    Implantable Lead Connection Status Connected    Paco Setting Mode (NBG Code) VVIR    Paco Setting Lower Rate Limit 60    Paco Setting Maximum Sensor Rate 120    Paco Setting AT Mode Switch Rate 171    Lead Channel Setting Sensing Polarity Bipolar    Lead Channel Setting Sensing Anode Location Right Atrium    Lead Channel Setting Sensing Anode Terminal Ring    Lead Channel Setting Sensing Cathode Location Right Atrium    Lead Channel Setting Sensing Cathode Terminal Tip    Lead Channel Setting Sensing Sensitivity 4.0    Lead Channel Setting Sensing Polarity Bipolar    Lead Channel Setting Sensing Anode Location Right Ventricle    Lead Channel Setting Sensing Anode Terminal Ring    Lead Channel Setting Sensing Cathode Location Right Ventricle    Lead Channel Setting Sensing Cathode Terminal Tip    Lead Channel Setting Sensing Sensitivity 0.45    Lead Channel Setting Pacing Polarity Bipolar    Lead Channel Setting Pacing Anode Location Right Ventricle     Lead Channel Setting Pacing Anode Terminal Ring    Lead Channel Setting Sensing Cathode Location Right Ventricle    Lead Channel Setting Sensing Cathode Terminal Tip    Lead Channel Setting Pacing Pulse Width 0.4    Lead Channel Setting Pacing Amplitude 1.5    Lead Channel Setting Pacing Capture Mode Adaptive    Zone Setting Type Category VF    Zone Setting Vendor Type Category VF    Zone Setting Status Active    Zone Setting Detection Interval 320    Zone Setting Detection Beats Numerator 30    Zone Setting Detection Beats Denominator 40    Zone Setting Type Category VT    Zone Setting Vendor Type Category FastVT    Zone Setting Status Active    Zone Setting Detection Interval 240    Zone Setting Type Category VT    Zone Setting Vendor Type Category VT    Zone Setting Status Inactive    Zone Setting Detection Interval 360    Zone Setting Detection Beats Numerator 16    Zone Setting Detection Beats Denominator 16    Zone Setting Type Category VT    Zone Setting Vendor Type Category MonVT    Zone Setting Status Monitor    Zone Setting Detection Interval 360    Zone Setting Detection Beats Numerator 32    Zone Setting Detection Beats Denominator 32    Zone Setting Type Category ATRIAL_FIBRILLATION    Zone Setting Vendor Type Category FastATAF    Zone Setting Status Monitor    Zone Setting Detection Interval 200    Zone Setting Type Category AT/AF    Zone Setting Status Active    Zone Setting Detection Interval 350    Lead Channel Impedance Value 380    Lead Channel Sensing Intrinsic Amplitude 0.1    Lead Channel Impedance Value 285    Lead Channel Impedance Value 380    Lead Channel Sensing Intrinsic Amplitude 11.3    Lead Channel Pacing Threshold Amplitude 0.625    Lead Channel Pacing Threshold Pulse Width 0.4    Battery Date Time of Measurements 08108426006707    Battery RRT Trigger 2.8 V    Battery Remaining Longevity 126    Battery Voltage 3.01    Capacitor Charge Type Shock    Capacitor Last Charge Date Time  [id number]    Capacitor Charge Time 3.9    Capacitor Charge Energy 18.0    Paco Statistic Date Time Start [id number]    Paco Statistic Date Time End [id number]    Paco Statistic RA Percent Paced 44.49    Paco Statistic RV Percent Paced 24.48    Paco Statistic AP  Percent 0    Paco Statistic AS  Percent 24.48    Paco Statistic AP VS Percent 0    Paco Statistic AS VS Percent 75.52    CRT Statistic Date Time Start [id number]    CRT Statistic Date Time End [id number]    Atrial Tachy Statistic Date Time Start [id number]    Atrial Tachy Statistic Date Time End [id number]    Atrial Tachy Statistic AT/AF [place] Percent 0    Therapy Statistic Recent Shocks Delivered 0    Therapy Statistic Recent Shocks Aborted 0    Therapy Statistic Recent ATP Delivered 0    Therapy Statistic Recent Date Time Start [id number]    Therapy Statistic Recent Date Time End [id number]    Therapy Statistic Total Shocks Delivered 0    Therapy Statistic Total Shocks Aborted 0    Therapy Statistic Total ATP Delivered 0    Therapy Statistic Total  Date Time Start [id number]    Therapy Statistic Total  Date Time End [id number]    Episode Statistic Recent Count 0    Episode Statistic Type Category AT/AF    Episode Statistic Recent Count 0    Episode Statistic Type Category Monitor    Episode Statistic Recent Count 0    Episode Statistic Type Category Patient Activated    Episode Statistic Recent Count 0    Episode Statistic Type Category SVT    Episode Statistic Recent Count 0    Episode Statistic Type Category VT    Episode Statistic Recent Count 0    Episode Statistic Type Category VF    Episode Statistic Recent Count 0    Episode Statistic Type Category VT    Episode Statistic Recent Count 0    Episode Statistic Type Category VT    Episode Statistic Recent Count 0    Episode Statistic Type Category VT    Episode Statistic Recent Date Time Start [id number]    Episode Statistic Recent Date Time  End [id number]    Episode Statistic Recent Date Time Start 49546125263253    Episode Statistic Recent Date Time End 07286063408763    Episode Statistic Recent Date Time Start 56580778485732    Episode Statistic Recent Date Time End 02490597483088    Episode Statistic Recent Date Time Start 75481739417313    Episode Statistic Recent Date Time End 82115342019555    Episode Statistic Recent Date Time Start 48106032793163    Episode Statistic Recent Date Time End 55611154770551    Episode Statistic Recent Date Time Start 56262061342553    Episode Statistic Recent Date Time End 14585441502193    Episode Statistic Recent Date Time Start 91729444617624    Episode Statistic Recent Date Time End 30146526812371    Episode Statistic Recent Date Time Start 85395951654314    Episode Statistic Recent Date Time End 44502798302213    Episode Statistic Recent Date Time Start 40324772035028    Episode Statistic Recent Date Time End 85349521048207    Episode Statistic Total Count 3,496    Episode Statistic Type Category AT/AF    Episode Statistic Total Count 4,134    Episode Statistic Type Category Monitor    Episode Statistic Total Count 0    Episode Statistic Type Category Patient Activated    Episode Statistic Total Count 0    Episode Statistic Type Category SVT    Episode Statistic Total Count 21    Episode Statistic Type Category VT    Episode Statistic Total Count 0    Episode Statistic Type Category VF    Episode Statistic Total Count 0    Episode Statistic Type Category VT    Episode Statistic Total Count 0    Episode Statistic Type Category VT    Episode Statistic Total Count 0    Episode Statistic Type Category VT    Episode Statistic Total Date Time Start 21751860825884    Episode Statistic Total Date Time End 67057722430121    Episode Statistic Total Date Time Start 35283907560033    Episode Statistic Total Date Time End 80516570371157    Episode Statistic Total Date Time Start 81795356692586    Episode Statistic Total  Date Time End 20240310184107    Episode Statistic Total Date Time Start 45316714690607    Episode Statistic Total Date Time End 20240310184107    Episode Statistic Total Date Time Start 20221028111650    Episode Statistic Total Date Time End 20240310184107    Episode Statistic Total Date Time Start 20221028111650    Episode Statistic Total Date Time End 20240310184107    Episode Statistic Total Date Time Start 20221028111650    Episode Statistic Total Date Time End 20240310184107    Episode Statistic Total Date Time Start 20221028111650    Episode Statistic Total Date Time End 20240310184107    Episode Statistic Total Date Time Start 20221028111650    Episode Statistic Total Date Time End 20240310184107    Narrative    Encounter Type: Routine remote ICD transmission.   Device: Medtronic Cobalt.   Pacing %/Programmed:  24% at VVIR 60 ppm.   Lead(s): Stable.   Battery longevity: 10yrs, 6mo estimated.   Presenting: Atrial fibrillation with ventricular sensing  bpm.   Atrial high rates: n/a.  Anticoagulant: warfarin.   Ventricular High rates: since 12/6/23; No VT/VF detected.   Comments: Normal device function.   Plan: next remote check scheduled for 6/17/24. LINDA Lin, Device Specialist    Device follow up for the entirety of having the ICD, based on best practices determined by [employer] and in Compliance with Medicare guidelines. Continue remote device monitoring per patient plan.    I have reviewed and interpreted the device interrogation, settings, programming, and encounter summary. The device is functioning within normal device parameters. I agree with the current findings, assessment and plan.   NM Lung Scan Perfusion Particulate    Narrative    Examination: NM LUNG SCAN PERFUSION PARTICULATE       Date: 3/11/2024 1:07 PM     Indication: evaluate for PE; Pulmonary embolism; Male sex; No prior  imaging in the last 24 hours; Pulmonary Embolism Rule-Out Criteria  (PERC) score > 0; Revised [place]  Score (RGS) not >= 11; No D-dimer  result available; D-dimer not ordered     Comparison: Chest radiograph, 3/10/2024    Additional Information: none    Technique:    The patient received 7.0 mCi of Tc-99m labeled MAA intravenously.  Anterior and posterior quantitative views were obtained of the lungs  using a dual headed camera camera. A standard eight view lung  perfusion scan was also obtained. Calculations were performed using  the geometric mean technique.    Findings:    The perfusion images demonstrate photopenia region in the right upper  chest is likely artifact from pacing device. Otherwise normal  perfusion study.      Impression    Impression:  No pulmonary embolus.    I have personally reviewed the examination and initial interpretation  and I agree with the findings.    VIRGIL PALM MD         SYSTEM ID:  [id number]

## 2024-03-11 NOTE — CONSULTS
PULMONARY CONSULTATION     Patient:  Buck Sinclair   Date of birth 1945, Medical record number [id number]  Date of Visit:  03/11/2024  Date of Admission: 3/9/2024  Consult Requester: Velia Cabrera MD  Reason for Consult: To assess COPD severity          Assessment and Recommendations:     ASSESSMENT:  Mr. Buck Sinclair is a 79 y.o. male patient with PMHx of chronic respiratory failure on 6-8L NC home O2 at baseline, Pulmonary HTN (last RHC in 2022 with mPAP= 37mmHg, repeat RHC this admission), CKD IIIA, CAD s/p CANDELARIA x3 to the gjwrzjas-jk-mbtkzc LAD (9/2017), longstanding persistent AF s/p multiple electrical cardioversions and extensive catheter ablations x2 in 2011 (on coumadin), ischemic cardiomyopathy s/p Medtronic dual-chamber PPM in 1999 replaced with a Medtronic dual-chamber implantable cardiac defibrillator on 6/11/2014, MDD, HLD. He was admitted on 3/8/2024 with acute on chronic RS failure and acute renal failure with concerns for possible over diuresis and was transferred to [facility] for a RHC. He has been on home oxygen since 4 years, initially on 2-4L NC, but slowly progressed to a baseline of 6-8L NC. Currently he is requiring HFNC with 100% FiO2 at 55LPM.    PROBLEM LIST AND DISCUSSION:     # Acute on chronic hypoxic and hypercarbic respiratory failure  # COPD Gold E  # Pulmonary HTN- Moderate precapillary PHTN; likely Group 3 (2/2 COPD) vs Group 1 (2/2 possible scleroderma)  The patient has COPD Gold E and is under adequate treatment for it. Per his PFTs dated 10/7/22, FEV1/FVC is 61%, significant for obstructive pathology. CT (3/9) shows hyperinflation of the lungs and moderate mid and upper lung centrilobular emphysema. Overall his COPD seems to have remained the same and is not progressing. The reasons for his current exacerbation may be PE, infections, and volume overload. Pulmonary embolism has been ruled out by NM VQ scan. Would also like to rule out infective pathologies given  his acute exacerbation, and will be sending a complete viral panel and sputum cultures on him. Patient was euvolemic on exam. Lastly, the exacerbation could be due to worsening PHTN. Awaiting RHC for him.    Per the PFTs from 2022 he also has overall decreased FVC and also a decreased TLC, suggestive of restrictive pattern. This makes us think in terms of ILD and other restrictive pathologies. Given his pulmonary HTN, possible that he has underlying systemic sclerosis leading to Group 1 PHTN, in addition to the COPD related Group 3 PHTN. Esophageal dysmotility is another component of systemic sclerosis, and he seems to be having this feature. His CT does show esophageal dilatation and history is also consistent with swallowing difficulty, with food feeling stuck in his throat at times. No known history of autoimmune disease. No obvious skin changes per our examination. We would like to further investigate for ILD, keeping systemic sclerosis under high suspicion, and will be sending a full ILD panel on him.     # Ischemic cardiomyopathy with recovered LV function and mildly reduced RV function  # Essential HTN   # CAD- s/p LAD PCI/CANDELARIA 9/20/2017    # CHRISSY on CKD3a- likely 2/2 overdiuresis- resolving  # Hypokalemia- resolved    RECOMMENDATION:  ILD Panel- ordered  Increase Methylprednisone to 125 mg Q8H- ordered  Full respiratory viral panel- ordered  Sputum cultures- ordered  RHC per cardiology team    Thank you for this consult.    Patient was discussed with Dr. Talita Perez.     Sai BRISCOE  Medical Student  ________________________________________________________________    Consult Question: To assess COPD severity.  Admission Diagnosis: Pulmonary hypertension (H) [I27.20]         History of Present Illness:     Mr. Buck Sinclair is a 79 y.o. male patient with PMHx of chronic respiratory failure on 6-8L NC home O2 at baseline, Pulmonary HTN (last RHC in 2022 with mPAP= 37mmHg, repeat RHC this admission), CKD  IIIA, CAD s/p CANDELARIA x3 to the dualhfoq-sn-nwotxp LAD (9/2017), longstanding persistent AF s/p multiple electrical cardioversions and extensive catheter ablations x2 in 2011 (on coumadin), ischemic cardiomyopathy s/p Medtronic dual-chamber PPM in 1999 replaced with a Medtronic  dual-chamber implantable cardiac defibrillator on 6/11/2014, MDD, HLD who was admitted on 3/8/2024 with acute on chronic RS failure and acute renal failure with concerns for possible over diuresis who was transferred for a RHC. Patient notes he has been having increased SOB with increased aggressive outpatient diuresis, he had a worsening creatinine causing him to present to the ED. He received 500cc of IV fluids in the ED. His home diuretics were held due to his CHRISSY. As patient had increased oxygen requirements to 70% fio2 on 50LPM pulm saw him and recommended transfer to the [place] for RHC.  The patient does admit to having smoked earlier in his life, however he hasn't smoked in many years. 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 No other significant exposures.         Review of Systems:   CONSTITUTIONAL:  No fevers or chills  EYES: negative for icterus  ENT:  negative for hearing loss, tinnitus, complains of sore throat after HFNC was started  RESPIRATORY:  cough with sputum present, and dyspnea present and requiring O2 at rest  CARDIOVASCULAR:  negative for chest pain, dyspnea present  GASTROINTESTINAL:  negative for nausea, vomiting, diarrhea and constipation  GENITOURINARY:  negative for dysuria  HEME:  No easy bruising  INTEGUMENT:  negative for rash and pruritus  NEURO:  Negative for headache         Past Medical History:     Past Medical History:   Diagnosis Date    Anemia     Asthma without status asthmaticus 05/05/2021    Atrial fibrillation (H)     BPH (benign prostatic hyperplasia)      Cardiomyopathy (H)     CKD (chronic kidney disease) stage 3, GFR 30-59 ml/min (H) 05/24/2023    Congestive heart failure (H)     COPD, group B, by GOLD 2017 classification (H)     Coronary artery disease due to calcified coronary lesion     Dyslipidemia, goal LDL below 70     Essential hypertension     Heart failure with reduced ejection fraction (H) 08/17/2023    Hemoptysis 10/04/2017    History of transfusion     Hyperlipidemia     Persistent atrial fibrillation (H)     Pneumonia of left lower lobe due to infectious organism 10/04/2017    Pulmonary hypertension (H)     Skin cancer of trunk     Status post catheter ablation of atrial fibrillation 06/07/2017    PVI 4-2011 (Cryo/PVI + roof line + CTI line) Re-do PVI 7-2011 (RFA/PVI + CFE + VIDYA + confirmed CTI line)    Ventricular tachycardia (H)             Past Surgical History:     Past Surgical History:   Procedure Laterality Date    CARDIAC DEFIBRILLATOR PLACEMENT      CARDIOVERSION  07/11/2018    x20, last 2/12/15, 10/2015, 11/18/16, 6/16/17 by Lauren Foster CNP    CARDIOVERSION  07/11/2018    CARDIOVERSION  11/19/2021    COLONOSCOPY N/A 04/28/2017    Procedure: COLONOSCOPY with 2 ascending polyps and 1 transverse polyp;  Surgeon: Jose Whittington MD;  Location: [facility] GI;  Service:     CORONARY ANGIOGRAPHY ADULT ORDER      CV CORONARY ANGIOGRAM N/A 09/20/2017    Procedure: Coronary Angiogram;  Surgeon: Sergio Cervantes MD;  Location: [facility] Cath Lab;  Service:     CV CORONARY ANGIOGRAM N/A 01/24/2022    Procedure: Coronary Angiogram;  Surgeon: Christi Saunders MD;  Location: [facility] LAB CV    CV LEFT HEART CATH N/A 01/24/2022    Procedure: Left Heart Cath;  Surgeon: Christi Saunders MD;  Location: [facility] LAB CV    CV RIGHT AND LEFT HEART CATH N/A 01/24/2022    Procedure: Right and Left Heart Catherization;  Surgeon: Christi Saunders MD;  Location: [facility] LAB CV    EP ICD GENERATOR REPLACEMENT DUAL N/A 10/28/2022    Procedure:  Implantable Cardioverter Defibrillator Generator Replacement Dual;  Surgeon: Elo Collins MD;  Location: [facility] CATH LAB CV    EP ICD INSERT      FRACTURE SURGERY Left     wrist    HEART CATH, ANGIOPLASTY      IMPLANT AUTOMATIC IMPLANTABLE CARDIOVERTER DEFIBRILLATOR      INGUINAL HERNIA REPAIR Left     while in the [employer] in [place] after 13 month in [place]    INSERT / REPLACE / REMOVE PACEMAKER      IR MISCELLANEOUS PROCEDURE  2014    OTHER SURGICAL HISTORY      left hand surgery---tendon repair    OR ABLATE HEART DYSRHYTHM FOCUS  2011    Catheter Ablation Atrial Fibrillation PVI 2011 (Cryo+RF-PVI + roof line + CTI line)    OR ABLATE HEART DYSRHYTHM FOCUS  2011    Re-do PVI 2011 (RFA-PVI + CFE + VIDYA + confirmation of CTI line)    TOTAL SHOULDER REPLACEMENT Right 2016    Dr. Abernathy of [facility]    WRIST SURGERY Left     ZZC MYERS W/O FACETEC FORAMOT/DSKC  VRT SEG, CERVICAL      Laminectomy Lumbar;  Recorded: 2012;            Family History:   Reviewed and non-contributory.   Family History   Problem Relation Age of Onset    Cancer Mother         leukemia    Cancer Father         bladder    Cancer Sister         breast with lung met.    Aneurysm Sister     CABG Brother     CABG Brother     Valvular heart disease Brother         valve replacement            Social History:     Social History     Tobacco Use    Smoking status: Former     Packs/day: 1.00     Years: 4.00     Additional pack years: 0.00     Total pack years: 4.00     Types: Cigarettes     Quit date: 1968     Years since quittin.2    Smokeless tobacco: Never   Substance Use Topics    Alcohol use: Yes     Alcohol/week: 2.0 standard drinks of alcohol     Comment: Alcoholic Drinks/day: 1 beer per week     History   Sexual Activity    Sexual activity: Yes    Partners: Female    Birth control/ protection: Post-menopausal            Current Medications:      aspirin  81 mg Oral Daily    [Held  by provider] atorvastatin  40 mg Oral QPM    empagliflozin  10 mg Oral Daily    FLUoxetine  20 mg Oral Daily    fluticasone  1 puff Inhalation Daily    fluticasone-vilanterol  1 puff Inhalation QPM    fluticasone-vilanterol  1 puff Inhalation Daily    ipratropium - albuterol 0.5 mg/2.5 mg/3 mL  3 mL Nebulization 4x daily    methylPREDNISolone  125 mg Intravenous Q8H    Followed by    [START ON 3/12/2024] methylPREDNISolone  62.5 mg Intravenous Q12H    metoprolol succinate ER  25 mg Oral Daily    polyethylene glycol  17 g Oral Daily    sodium chloride (PF)  3 mL Intracatheter Q8H    umeclidinium  1 puff Inhalation Daily    vitamin C  1,000 mg Oral Daily    vitamin D2  50,000 Units Oral Once per day on Monday Thursday            Allergies:     Allergies   Allergen Reactions    Adhesive Tape Other (See Comments)     ADHESIVE TAPE; SKIN IRRITATION; Skin pulled off with foam tape      Amiodarone      ADVERSE REACTION.  Sunlight sensitivity.    Lisinopril             Physical Exam:   Vitals were reviewed  Patient Vitals for the past 24 hrs:   BP Temp Temp src Pulse Resp SpO2 Weight   03/11/24 1300 105/67 -- -- 96 17 100 % --   03/11/24 1200 102/59 97.6  F (36.4  C) Axillary 86 20 97 % --   03/11/24 1100 99/71 -- -- 94 17 97 % --   03/11/24 1000 97/74 -- -- 91 19 95 % --   03/11/24 0900 99/69 -- -- 84 13 93 % --   03/11/24 0824 -- -- -- -- -- 94 % --   03/11/24 0800 99/72 97.7  F (36.5  C) Axillary 85 16 93 % --   03/11/24 0700 95/72 -- -- 86 14 94 % --   03/11/24 0600 99/59 -- -- 97 15 96 % 97.7 kg (215 lb 6.2 oz)   03/11/24 0500 100/72 -- -- 92 18 (!) 88 % --   03/11/24 0400 98/74 -- -- 97 17 92 % --   03/11/24 0300 (!) 84/60 -- -- 89 22 90 % --   03/11/24 0200 95/57 -- -- 99 20 94 % --   03/11/24 0123 -- -- -- 94 21 (!) 87 % --   03/11/24 0100 (!) 87/59 -- -- 93 22 93 % --   03/11/24 0000 91/59 98  F (36.7  C) Axillary 99 24 95 % --   03/10/24 2300 101/59 -- -- 87 23 94 % --   03/10/24 2200 92/58 -- -- 94 22 94 % --    03/10/24 2100 114/86 -- -- 100 28 90 % --   03/10/24 2017 -- -- -- 84 24 93 % --   03/10/24 2000 96/63 97.3  F (36.3  C) Oral 81 18 92 % --   03/10/24 1900 103/61 -- -- 87 20 97 % --   03/10/24 1800 100/77 -- -- 80 18 94 % --   03/10/24 1700 99/67 -- -- 85 24 (!) 89 % --   03/10/24 1641 -- -- -- 75 24 91 % --   03/10/24 1600 (!) 86/65 98.7  F (37.1  C) Axillary 74 24 92 % --   03/10/24 1500 93/71 -- -- 82 23 99 % --       Physical Examination:  GENERAL:  well-developed, well-nourished, in bed in no acute distress.   HEENT:  Head is normocephalic, atraumatic   EYES:  Eyes have anicteric sclerae without conjunctival injection or stigmata of endocarditis.    ENT:  Oropharynx is moist without exudates or ulcers. Tongue is midline  NECK:  Supple. No cervical lymphadenopathy  LUNGS:  Clear to auscultation bilateral.   CARDIOVASCULAR:  Regular rate and rhythm with no murmurs, gallops or rubs.  ABDOMEN:  Normal bowel sounds, soft, nontender. No appreciable hepatosplenomegaly  SKIN:  No acute rashes.  Line(s) are in place without any surrounding erythema or exudate. No stigmata of endocarditis.  NEUROLOGIC:  Grossly nonfocal. Active x4 extremities         Laboratory Data:     Inflammatory Markers    Recent Labs   Lab Test 03/11/24  1028 02/15/22  0941   SED 56*  --    CRP  --  <2.9       Hematology Studies    Recent Labs   Lab Test 03/11/24  0617 03/10/24  0623 03/08/24  1334 06/01/23  0539 05/25/23  0444 05/23/23  0438   WBC 15.1* 10.7 8.3 12.6* 10.9 9.0   HGB 11.8* 12.9* 13.2* 11.7* 12.2* 11.8*    101* 100 100 99 101*    152 176 140* 134* 139*       Metabolic Studies     Recent Labs   Lab Test 03/11/24  0617 03/10/24  0623 03/09/24  2315 03/09/24  1948 03/09/24  1358 03/09/24  0510 03/08/24  2220 03/08/24  1334 03/08/24  1201     --  140  --   --  139  --  137 137   POTASSIUM 3.4 3.8 3.3* 3.3* 3.2* 2.7*   < > 2.3* 3.0*   CHLORIDE 95*  --  94*  --   --  94*  --  88* 86*   CO2 27  --  31*  --   --  33*   "--  33* 38*   BUN 68.5*  --  63.5*  --   --  66.5*  --  65.3* 67.5*   CR 2.36*  --  2.48*  --   --  2.70*  --  3.03* 3.05*   GFRESTIMATED 27*  --  26*  --   --  23*  --  20* 20*    < > = values in this interval not displayed.       Hepatic Studies    Recent Labs   Lab Test 03/11/24  0617 03/09/24  2315 05/20/23  0427 05/19/23  0446 05/18/23  0855 02/15/22  0941 11/17/21  1648   BILITOTAL 1.8* 1.7*  --  1.0 1.0 0.5 1.0   ALKPHOS 66 77  --  56 69 56 48   ALBUMIN 3.9 4.3 3.3* 3.3* 3.7 3.3* 3.8   AST 32 33  --  24 24 23 23   ALT 13 13  --  15 15 24 16       Microbiology:  No results found for: \"CULT\"    Respiratory Virus Testing    No results found for: \"RS\", \"FLUAG\"       CT CHEST (3/9):  IMPRESSION:   1.  Segments of cylindrical bronchiectasis with endobronchial mucosal thickening and secretions in the right middle lobe, lingula and both lower lobes as well as patchy airspace opacity in the adjacent lung parenchyma consistent with bronchopneumonia.   2.  Hyperinflation of the lungs and moderate mid and upper lung centrilobular emphysema.   3.  Moderate cardiomegaly and severe three-vessel coronary artery calcification.  4.  Enlargement central pulmonary arteries (MPA diameter 4.6 cm) consistent with chronic pulmonary arterial hypertension.        NM LUNG SCAN PERFUSION PARTICULATE (3/11)  Impression: Low probability of pulmonary embolism based on this perfusion only study.    Resident/Fellow Attestation   I, Beatriz Balderas MD, was present with the medical student who participated in the service and in the documentation of the note.  I have verified the history and personally performed the physical exam and medical decision making.  I agree with the assessment and plan of care as documented in the note.  Concern for restrictive pathology in addition to obstructive disease. Increased steroids as both a diagnostic and therapeutic trial.  "

## 2024-03-11 NOTE — PROGRESS NOTES
ANTICOAGULATION  MANAGEMENT: Discharge Review    Buck Sinclair chart reviewed for anticoagulation continuity of care    Hospital Admission on 3/8/24-3/10/24 for acute kidney injury, hypokalemia.    Discharge disposition: Transferred hospitals to [facility]    Results:    Recent labs: (last 7 days)     03/05/24  0000 03/08/24  1340 03/09/24  0510 03/09/24  2315 03/10/24  0623 03/10/24  0939 03/11/24  0617   INR 2.7 2.94* 2.82* 3.08* 3.05*  --  3.25*   AAUFH  --   --   --   --   --  <0.10  --      Anticoagulation inpatient management:     3/8- 2mg  3/9- 2.5 mg  3/10- 2.5 mg    Anticoagulation discharge instructions:     Warfarin dosing: home regimen continued   Bridging: No   INR goal change: No      Medication changes affecting anticoagulation: No    Additional factors affecting anticoagulation: No     PLAN     No adjustment to anticoagulation plan needed    [facility] will resume monitoring upon discharge from [facility]    Anticoagulation Calendar updated    Desire Cortez RN

## 2024-03-11 NOTE — PHARMACY-VANCOMYCIN DOSING SERVICE
Pharmacy Vancomycin Initial Note  Date of Service 2024  Patient's  1945  79 year old, male    Indication: Ventilator-Associated Pneumonia    Current estimated CrCl = Estimated Creatinine Clearance: 35.1 mL/min (A) (based on SCr of 2.36 mg/dL (H)).    Creatinine for last 3 days  3/9/2024:  5:10 AM Creatinine 2.70 mg/dL; 11:15 PM Creatinine 2.48 mg/dL  3/11/2024:  6:17 AM Creatinine 2.36 mg/dL    Recent Vancomycin Level(s) for last 3 days  No results found for requested labs within last 3 days.      Vancomycin IV Administrations (past 72 hours)        No vancomycin orders with administrations in past 72 hours.                    Nephrotoxins and other renal medications (From now, onward)      Start     Dose/Rate Route Frequency Ordered Stop    24 1700  vancomycin (VANCOCIN) 1,000 mg in 200 mL dextrose intermittent infusion         1,000 mg  200 mL/hr over 1 Hours Intravenous EVERY 24 HOURS 24 1555      24 1600  vancomycin (VANCOCIN) 2,250 mg in sodium chloride 0.9 % 500 mL intermittent infusion         2,250 mg  over 120 Minutes Intravenous ONCE 24 1552      03/10/24 0800  empagliflozin (JARDIANCE) tablet 10 mg        Note to Pharmacy: PTA Sig:Take 1 tablet (10 mg) by mouth daily      10 mg Oral DAILY 03/10/24 0710              Contrast Orders - past 72 hours (72h ago, onward)      Start     Dose/Rate Route Frequency Stop    24 1230  technetium pertechnetate with albumin (Tc99m MAA) radioisotope injection 4.8-7.2 millicurie         4.8-7.2 millicurie Intravenous ONCE 24 1223            [employer] Prediction of Planned Initial Vancomycin Regimen  Loading dose: 2250 mg at 16:00 2024.  Regimen: 1000 mg IV every 24 hours.  Start time: 16:00 on 2024  Exposure target: AUC24 (range)400-600 mg/L.hr   AUC24,ss: 580 mg/L.hr  Probability of AUC24 > 400: 85 %  Ctrough,ss: 20 mg/L  Probability of Ctrough,ss > 20: 50 %  Probability of nephrotoxicity (Lodise AMIRA ):  17 %          Plan:  Start vancomycin  2250 mg IV x1, then 1000 mg IV q24h.   Vancomycin monitoring method: AUC  Vancomycin therapeutic monitoring goal: 400-600 mg*h/L  Pharmacy will check vancomycin levels as appropriate in 1-3 Days.    Serum creatinine levels will be ordered daily for the first week of therapy and at least twice weekly for subsequent weeks.      Jessica Simon RP

## 2024-03-11 NOTE — PROVIDER NOTIFICATION
RN aware of INR 3, Heparin ordered and given for 2 hrs, stopped. Double ASA order, called pharmacy to discontinue an order after team reports only need one dose of 81mg. Warfarin dose was dosed and given, RN didn't read fellow's note till after med was given, notified fellow to please check active meds is up to date to patient's situation. Provider reports understanding. Vitamin K ordered.

## 2024-03-11 NOTE — PLAN OF CARE
Goal Outcome Evaluation:      Plan of Care Reviewed With: patient    Overall Patient Progress: no changeOverall Patient Progress: no change    Outcome Evaluation: HFNC 80%, 45 lpm. SPO2 maintained >88%. VQ scan ordered. Vital signs stable. NPO at midnight for R heart cath. Voiding per urinal. Alert and oriented x4, following commands, PERRL.

## 2024-03-11 NOTE — PLAN OF CARE
ICU End of Shift Summary. See flowsheets for vital signs and detailed assessment.    Changes this shift: VSS. Afebrile. HFNC between % FiO2, 45-55L with movement. NucMed scan this shift unable to be completed d/t high ventilator needs. R)heart cath when able. Sputum sample sent grew gram pos bacteria, blood cultures and MRSA sample sent. No new skin concerns, no acute events this shift.     Plan: Continue plan of care, adjust as needed.      Goal Outcome Evaluation:      Plan of Care Reviewed With: patient    Overall Patient Progress: no changeOverall Patient Progress: no change    Outcome Evaluation: see note

## 2024-03-12 NOTE — PLAN OF CARE
ICU End of Shift Summary. See flowsheets for vital signs and detailed assessment.    Changes this shift: pt went to cath lab for RHC, cough are productive with bloody sputum, oxygen need fluctuated between HFNC (100%,50L) + oxymask (10L) to HFNC(75%, 50L). Pt tolerated BIPAP better ( went to cath lab with BiPAP)      Plan: continue with abx ,steroid and oxygen support          Goal Outcome Evaluation:      Plan of Care Reviewed With: patient, spouse    Overall Patient Progress: no changeOverall Patient Progress: no change    Outcome Evaluation: pt oxygen requirment is the same      Problem: Gas Exchange Impaired  Goal: Optimal Gas Exchange  Outcome: Not Progressing  Intervention: Optimize Oxygenation and Ventilation  Recent Flowsheet Documentation  Taken 3/12/2024 1200 by Jonathan Phan RN  Head of Bed (HOB) Positioning: HOB at 20-30 degrees  Taken 3/12/2024 0800 by Jonathan Phan RN  Head of Bed (HOB) Positioning: HOB at 20-30 degrees     Problem: Pulmonary Hypertension  Goal: Optimal Activity Tolerance  Intervention: Optimize Activity Tolerance  Recent Flowsheet Documentation  Taken 3/12/2024 1200 by Jonathan Phan, FENG  Activity Management: activity adjusted per tolerance  Environmental Support:   calm environment promoted   environmental consistency promoted   rest periods encouraged  Taken 3/12/2024 0800 by Jonathan Phan RN  Activity Management: activity adjusted per tolerance  Environmental Support:   calm environment promoted   environmental consistency promoted   rest periods encouraged

## 2024-03-12 NOTE — PLAN OF CARE
Major Shift Events: No major shift events    Neuro: A&Ox4. [place] in both ears, use of hearing aids. Denied pain. Call light appropriate. PERRL. Up with assist of 1.  CV: Afib 80-90s. Afebrile. MAP goal >65.   Resp: HFNC 100% 55LPM with oxymask at 15LPM. Infrequent cough, minimal tan/pink sputum.  GI: Regular diet. No BM overnight.  : WDL  Skin: Scabs on shins and generalized bruising on arms.  IV: R PIV      Plan: NucMed scan and cath lab.    For complete assessment and vital data see flowsheets.        Goal Outcome Evaluation:      Plan of Care Reviewed With: patient    Overall Patient Progress: no changeOverall Patient Progress: no change    Outcome Evaluation: VSS. Oxygen use remains the same overnight.

## 2024-03-12 NOTE — PROGRESS NOTES
[facility]  Cardiology Heart Failure Service (Cards 2) H&P Note    Patient Name: Buck Sinclair    Medical Record Number: [id number]    YOB: 1945  PCP: Vivek Guerrero    Admit Date/Time: 3/9/2024 10:02 PM   3    Assessment & Plan   79 y.o. male patient with PMH chronic RS failure on 6-8L NC, PHTN, CKD IIIA, CAD s/p CANDELARIA x3 to the [id number] LAD (9/2017), longstanding persistent AF s/p multiple electrical cardioversions and extensive catheter ablations x2 in 2011 (on coumadin), ischemic cardiomyopathy s/p Medtronic dual-chamber PPM in 1999 replaced with a Medtronic dual-chamber implantable cardiac defibrillator on 6/11/2014, MDD, HLD who was admitted on 3/8/2024 with acute on chronic RS failure and acute renal failure with concerns for possible over diuresis who was transferred for a RHC, which was consistent with group 3 pulmonary hypertension, which, unfortunately, does not make him a candidate for vasodilators, thus there is no benefit from treprostinil. No PE on VQ scan. Mostly treating for COPD exacerbation with antibiotics, inhalers and O2. Pulmonary medicine following, requested Rheumatology consult.    Neuro:   #Depression    Plan:  -Continue home prozac     CV:  #Ischemic cardiomyopathy with recovered LV function and mildly reduced RV function  Longstanding persistent AF s/p multiple electrical cardioversions and extensive catheter ablations x2 in 2011 (on coumadin)  #Essential hypertension.  #CAD - LAD PCI/CANDELARIA 9/20/2017   ECHO 5/23 EF 55-60% but severe pulmonary HTN    Right Heart Catheterization        PCWP: 6 mmHg         PA:  57/20/35 mmHg (s/d/m)        RV: 57/5 mmHg (s/ed)        RA: --/17/11 mmHg        CO 4.15 L/min (thermodilution); 4.51 L/min (Shikha)        CI:  1.83 L/min/m2 (TD); 1.98 L/min/m2 (Shikha)       PVR: 6.9 (TD) Wood Units    Right sided filling pressures are mildly elevated. Left sided filling pressures are normal. Moderately  elevated pulmonary artery hypertension. Reduced cardiac output level.      Plan:   -Continue aspirin, statin    -Hold coumadin in anticipation of RHC, stop heparin gtt given INR is 3.0  - RHC consistent with group 3 PHTN, not amenable to vasodilator therapy  - Follow-up PHTN labs  - TTE with bubble study without a shunt     Pulm:  #COPD GOLD E; brief history of tobacco use and inhalational exposure working as a .   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  Acute on chronic hypoxic and hypercarbic respiratory failure.    Plan:   - Continue PTA Spiriva, Symbicort, albuterol  - Supplemental oxygen to keep SpO2>89%  - Low threshold to manage for possible COPD exacerbation  - Consulted Pulm, appreciare rec's   - Increased Methylpred to 125 mg Q8hrs   - Full viral panel   - Sputum cultures; returned positive for GPCs; added empiric coverage, pending clinical course and pulm rec's    GI/Nutrition:  NO active issues    Plan:  -Cardiac diet  -NPO midnight for RHC with vasodilatory study tomorrow; INR high; will reorder when appropriate     Renal:  #CHRISSY on CKD3a baseline Cr 1.63 11/23 and slow climb since 3/4 from 2/27 to 3.03 with increase in diuresis   #Hypokalemia    Plan:   -avoid nephrotoxic meds  -Consider renal ultrasound  -Hold off on diuresis for now, appears euvolemic    ID:   No active concern for infection    Plan:     Heme:   -No active issue     Plan:   -Transfuse with Hgb goal >7  -Iron panel    Endo:  DM2    Plan:  -May need sliding scale insulin  -Keep BG <180     MSK/Rheum:    - No active issues    DVT Prophylaxis: Heparin gtt  Disposition:   - Unit 4c  Code Status:   No CPR- Do NOT Intubate     Thank you for allowing me to care for this patient, please don't hesitate to contact me with any questions regarding this plan.   Patient was discussed and evaluated with Velia Adams MD, attending physician, who agrees with the assessment and plan  above.    Alexander Toro MD PhD  Cardiology Fellow      Chief Complaint   Acute hypoxic respiratory failure      History of Present Illness   79 y.o. male patient with PMH chronic RS failure on 6-8L NC, PHTN, CKD IIIA, CAD s/p CANDELARIA x3 to the [id number] LAD (9/2017), longstanding persistent AF s/p multiple electrical cardioversions and extensive catheter ablations x2 in 2011 (on coumadin), ischemic cardiomyopathy s/p Medtronic dual-chamber PPM in 1999 replaced with a Medtronic dual-chamber implantable cardiac defibrillator on 6/11/2014, MDD, HLD who was admitted on 3/8/2024 with acute on chronic RS failure and acute renal failure with concerns for possible over diuresis who was transferred for a RHC. Patient notes he has been having increased SOB with increased aggressive outpatient diuresis, he had a worsening creatinine causing him to present to the ED. He received 500cc of IV fluids in the ED. His home diuretics were held due to his CHRISSY. As patient had increased oxygen requirements to 70% fio2 on 50LPM pulm saw him and recommended transfer to the [facility] for RHC.    Past Medical History    I have reviewed this patient's medical history and updated it with pertinent information if needed.   Past Medical History:   Diagnosis Date    Anemia     Asthma without status asthmaticus 05/05/2021    Atrial fibrillation (H)     BPH (benign prostatic hyperplasia)     Cardiomyopathy (H)     CKD (chronic kidney disease) stage 3, GFR 30-59 ml/min (H) 05/24/2023    Congestive heart failure (H)     COPD, group B, by GOLD 2017 classification (H)     Coronary artery disease due to calcified coronary lesion     Dyslipidemia, goal LDL below 70     Essential hypertension     Heart failure with reduced ejection fraction (H) 08/17/2023    Hemoptysis 10/04/2017    History of transfusion     Hyperlipidemia     Persistent atrial fibrillation (H)     Pneumonia of left lower lobe due to infectious organism 10/04/2017     Pulmonary hypertension (H)     Skin cancer of trunk     Status post catheter ablation of atrial fibrillation 06/07/2017    PVI 4-2011 (Cryo/PVI + roof line + CTI line) Re-do PVI 7-2011 (RFA/PVI + CFE + VIDYA + confirmed CTI line)    Ventricular tachycardia (H)        Past Surgical History   I have reviewed this patient's surgical history and updated it with pertinent information if needed.  Past Surgical History:   Procedure Laterality Date    CARDIAC DEFIBRILLATOR PLACEMENT      CARDIOVERSION  07/11/2018    x20, last 2/12/15, 10/2015, 11/18/16, 6/16/17 by Lauren Foster CNP    CARDIOVERSION  07/11/2018    CARDIOVERSION  11/19/2021    COLONOSCOPY N/A 04/28/2017    Procedure: COLONOSCOPY with 2 ascending polyps and 1 transverse polyp;  Surgeon: Jose Whittington MD;  Location: [facility];  Service:     CORONARY ANGIOGRAPHY ADULT ORDER      CV CORONARY ANGIOGRAM N/A 09/20/2017    Procedure: Coronary Angiogram;  Surgeon: Sergio Cervantes MD;  Location: [facility] Cath Lab;  Service:     CV CORONARY ANGIOGRAM N/A 01/24/2022    Procedure: Coronary Angiogram;  Surgeon: Christi Saunders MD;  Location: [facility] CV    CV LEFT HEART CATH N/A 01/24/2022    Procedure: Left Heart Cath;  Surgeon: Christi Saunders MD;  Location: [facility] LAB CV    CV RIGHT AND LEFT HEART CATH N/A 01/24/2022    Procedure: Right and Left Heart Catherization;  Surgeon: Christi Saunders MD;  Location: [facility] CV    EP ICD GENERATOR REPLACEMENT DUAL N/A 10/28/2022    Procedure: Implantable Cardioverter Defibrillator Generator Replacement Dual;  Surgeon: Elo Collins MD;  Location: [facility] LAB CV    EP ICD INSERT      FRACTURE SURGERY Left     wrist    HEART CATH, ANGIOPLASTY      IMPLANT AUTOMATIC IMPLANTABLE CARDIOVERTER DEFIBRILLATOR      INGUINAL HERNIA REPAIR Left 1967    while in the [employer] in [place] after 13 month in [place]    INSERT / REPLACE / REMOVE PACEMAKER      IR MISCELLANEOUS PROCEDURE  04/30/2014     OTHER SURGICAL HISTORY      left hand surgery---tendon repair    WA ABLATE HEART DYSRHYTHM FOCUS  04/2011    Catheter Ablation Atrial Fibrillation PVI Apr 2011 (Cryo+RF-PVI + roof line + CTI line)    WA ABLATE HEART DYSRHYTHM FOCUS  07/2011    Re-do PVI Jul 2011 (RFA-PVI + CFE + VIDYA + confirmation of CTI line)    TOTAL SHOULDER REPLACEMENT Right 03/03/2016    Dr. Abernathy of [facility]    WRIST SURGERY Left     ZZC MYERS W/O FACETEC FORAMOT/DSKC 1/2 VRT SEG, CERVICAL      Laminectomy Lumbar;  Recorded: 03/09/2012;           Allergies   Allergies   Allergen Reactions    Adhesive Tape Other (See Comments)     ADHESIVE TAPE; SKIN IRRITATION; Skin pulled off with foam tape      Amiodarone      ADVERSE REACTION.  Sunlight sensitivity.    Lisinopril        Current medications   Current Facility-Administered Medications   Medication    acetaminophen (TYLENOL) tablet 650 mg    acetylcysteine (MUCOMYST) 10 % nebulizer solution 4 mL    And    albuterol (PROVENTIL) neb solution 2.5 mg    albuterol (PROVENTIL HFA/VENTOLIN HFA) inhaler    aspirin EC tablet 81 mg    [Held by provider] atorvastatin (LIPITOR) tablet 40 mg    calcium carbonate (TUMS) chewable tablet 1,000 mg    cefTRIAXone (ROCEPHIN) 2 g vial to attach to  ml bag for ADULTS or NS 50 ml bag for PEDS    glucose gel 15-30 g    Or    dextrose 50 % injection 25-50 mL    Or    glucagon injection 1 mg    empagliflozin (JARDIANCE) tablet 10 mg    FLUoxetine (PROzac) capsule 20 mg    fluticasone (ARNUITY ELLIPTA) 100 MCG/ACT inhaler 1 puff    fluticasone-vilanterol (BREO ELLIPTA) 200-25 MCG/ACT inhaler 1 puff    fluticasone-vilanterol (BREO ELLIPTA) 200-25 MCG/ACT inhaler 1 puff    heparin ANTICOAGULANT injection 5,000 Units    lidocaine (LMX4) cream    lidocaine 1 % 0.1-1 mL    methylPREDNISolone sodium succinate (solu-MEDROL) injection 62.5 mg    metoprolol succinate ER (TOPROL XL) 24 hr tablet 25 mg    nitroGLYcerin (NITROSTAT) sublingual tablet 0.4 mg     ondansetron (ZOFRAN ODT) ODT tab 4 mg    Or    ondansetron (ZOFRAN) injection 4 mg    Patient is already receiving anticoagulation with heparin, enoxaparin (LOVENOX), warfarin (COUMADIN)  or other anticoagulant medication    Patient is already receiving mechanical prophylaxis    polyethylene glycol (MIRALAX) Packet 17 g    polyethylene glycol (MIRALAX) Packet 17 g    prochlorperazine (COMPAZINE) injection 5 mg    Or    prochlorperazine (COMPAZINE) tablet 5 mg    Or    prochlorperazine (COMPAZINE) suppository 12.5 mg    senna-docusate (SENOKOT-S/PERICOLACE) 8.6-50 MG per tablet 1 tablet    Or    senna-docusate (SENOKOT-S/PERICOLACE) 8.6-50 MG per tablet 2 tablet    sodium chloride (OCEAN) 0.65 % nasal spray 2 spray    sodium chloride (PF) 0.9% PF flush 3 mL    sodium chloride (PF) 0.9% PF flush 3 mL    umeclidinium (INCRUSE ELLIPTA) 62.5 MCG/ACT inhaler 1 puff    vancomycin (VANCOCIN) 1,000 mg in 200 mL dextrose intermittent infusion    vitamin C (ASCORBIC ACID) tablet 1,000 mg    vitamin D2 (ERGOCALCIFEROL) 61873 units (1250 mcg) capsule 50,000 Units    Warfarin Dose Required Daily - Pharmacist Managed       Medications Prior to Admission   Medication Sig Dispense Refill Last Dose    acetaminophen (TYLENOL) 325 MG tablet Take 650 mg by mouth 2 times daily as needed       albuterol (PROAIR HFA/PROVENTIL HFA/VENTOLIN HFA) 108 (90 Base) MCG/ACT inhaler INHALE 2 PUFFS INTO THE LUNGS EVERY 4 HOURS AS NEEDED FOR SHORTNESS OF BREATH OR WHEEZING 18 g 2     Ascorbic Acid (VITAMIN C) 500 MG CAPS Take 1,000 mg by mouth daily       aspirin (ASA) 81 MG EC tablet Take 1 tablet (81 mg) by mouth daily 90 tablet 0     atorvastatin (LIPITOR) 40 MG tablet Take 40 mg by mouth every evening       budesonide-formoterol (SYMBICORT) 160-4.5 MCG/ACT Inhaler Inhale 2 puffs into the lungs 2 times daily 10.2 g 11     bumetanide (BUMEX) 2 MG tablet Take 1 tablet (2 mg) by mouth 2 times daily (Patient taking differently: Take 2 mg by mouth 2  "times daily Increase dose to 4mg twice daily starting 3/1 or as instructed by cardiology) 180 tablet 3     co-enzyme Q-10 100 MG CAPS capsule Take 2 capsules (200 mg) by mouth daily 2 capsule      empagliflozin (JARDIANCE) 10 MG TABS tablet Take 1 tablet (10 mg) by mouth daily 90 tablet 3     fish oil-omega-3 fatty acids 1000 MG capsule Take 1 g by mouth 2 times daily       FLUoxetine (PROZAC) 20 MG capsule TAKE 1 CAPSULE BY MOUTH EVERY DAY       MAG64 64 MG TBEC CR tablet TAKE 2 TABLETS BY MOUTH EVERY  tablet 1     metolazone (ZAROXOLYN) 5 MG tablet Take 1 tablet (5 mg) by mouth daily 10 tablet 0     metoprolol succinate ER (TOPROL XL) 25 MG 24 hr tablet Take 1 tablet (25 mg) by mouth daily 30 tablet 11     multivitamin, therapeutic (THERA-VIT) TABS tablet Take 1 tablet by mouth daily       nitroGLYcerin (NITROSTAT) 0.4 MG sublingual tablet One tablet under the tongue every 5 minutes if needed for chest pain. May repeat every 5 minutes for a maximum of 3 doses in 15 minutes\" 25 tablet 3     polyethylene glycol (MIRALAX) 17 GM/Dose powder Take 17 g by mouth daily        potassium chloride ER (K-TAB) 20 MEQ CR tablet Take 4 tablets (80 mEq) by mouth daily (Patient taking differently: Take 80 mEq by mouth once) 60 tablet 0     sodium chloride (OCEAN) 0.65 % nasal spray Spray 2 sprays in nostril 4 times daily as needed       tiotropium (SPIRIVA) 18 MCG inhaled capsule Inhale 1 capsule (18 mcg) into the lungs daily 30 capsule 11     vitamin D2 (ERGOCALCIFEROL) 68201 units (1250 mcg) capsule Take 50,000 Units by mouth twice a week On Sunday and Wednesday       warfarin ANTICOAGULANT (COUMADIN) 2.5 MG tablet Take 1 tablet (2.5 mg) daily or as directed by the INR clinic 110 tablet 0     OXYGEN-HELIUM IN 4-5 L           Social History   I have reviewed this patient's social history and updated it with pertinent information if needed. Buck Sinclair  reports that he quit smoking about 56 years ago. His smoking use " included cigarettes. He has a 4 pack-year smoking history. He has never used smokeless tobacco. He reports current alcohol use of about 2.0 standard drinks of alcohol per week. He reports that he does not use drugs.    Family History   I have reviewed this patient's family history and updated it with pertinent information if needed.   Family History   Problem Relation Age of Onset    Cancer Mother         leukemia    Cancer Father         bladder    Cancer Sister         breast with lung met.    Aneurysm Sister     CABG Brother     CABG Brother     Valvular heart disease Brother         valve replacement       Review of Systems   Review of systems was not needed on this patient    Physical Exam   Temp: 98  F (36.7  C) Temp src: Oral BP: 119/88 Pulse: 91   Resp: 28 SpO2: (!) 84 % O2 Device: BiPAP/CPAP Oxygen Delivery: 50 LPM    Vital Signs with Ranges  Temp:  [96.9  F (36.1  C)-98.6  F (37  C)] 98  F (36.7  C)  Pulse:  [] 91  Resp:  [11-38] 28  BP: ()/(41-88) 119/88  FiO2 (%):  [50 %-100 %] 50 %  SpO2:  [84 %-99 %] 84 %  218 lbs 4.09 oz    General appearance - Lying in bed; appears in no acute distress.  Head: Normocephalic and atraumatic.   Eyes: Pupils are equal, round, and reactive to light. Extraocular movement intact. No conjunctival pallor. No scleral icterus.  Neck: No JVD, bruit.  Cardiovascular: Regular rhythm. S1 and S2 auscultated. No murmurs, rub, or gallops.  Pulmonary/Chest: Normal resp effort on RA, speaking in full sentences. Clear breath sounds to auscultation bilaterally. No wheezes, crackles, or rhonchi. No chest tenderness.   Abdominal: Bowel sounds present. Soft. No distension. No rigidity, rebound or guarding. No organomegaly, hernias or palpable masses on deep palpation. No CVA tenderness.  Extremities: Warm, no cyanosis, 2+ distal pulses bilaterally. No edema.   Skin: Skin is warm and not diaphoretic. No rash, bruising, or erythema.  Neuro: Alert and oriented to person, place, and  time. No focal neurological deficit.    Data   Data reviewed today:  I personally reviewed:        TTE: 03/09/2024   Left ventricular size, wall motion and function are normal. The ejection  fraction is 55-60%.  Flattened septum is consistent with RV pressure/volume overload.  The right ventricle is mild to moderately dilated.  Mildly decreased right ventricular systolic function  Right ventricular systolic pressure is elevated, consistent with moderate to  severe pulmonary hypertension.  There is moderate (2+) tricuspid regurgitation.  IVC diameter >2.1 cm collapsing <50% with sniff suggests a high RA pressure  estimated at 15 mmHg or greater.  Ascending Aorta dilatation is present.    TTE with shunt study:  Interpretation Summary  Global and regional left ventricular function is normal with an EF of 55-60%.  Severe right ventricular dilation is present.  Global right ventricular function is moderately reduced.  The right ventricular systolic pressure is 30mmHg above the right atrial  pressure.  Dilation of the inferior vena cava is present with abnormal respiratory  variation in diameter.  No pericardial effusion is present.  Pulmonary hypertension is present.     This study was compared with the study from 3/9/2024 .  PAP appear lower today otherwise no significant changes noted.     There was no shunt at the atrial septal level as assessed by color Doppler and  agitated saline bubble study at rest and with Valsalva maneuver.    LHC: 1/24/22   Left Main  The vessel was visualized by selective angiography and is moderate in size. There was 0% vessel disease.  Left Anterior Descending  Previously placed Prox LAD to Mid LAD drug eluting stent is widely patent. Previous treatment took place >2 years ago. No thrombosis in the previous stent. No restenosis in the previous stent.  Left Circumflex  Third Obtuse Marginal Branch  3rd Mrg lesion is 30% stenosed.  Right Coronary Artery  Mid RCA lesion is 20%  stenosed.    RHC: 1/24/22    LVEDP and PCWP are normal. The PA pressure was elevated at 66/24 mmHg with a mean pressure of 37 mmHg. See numbers below.   Shikha and TD cardiac outputs were both 5.7 l/min.   Sats on 2L NC oxygen: Ao 93%, PA 60%, RA 59%    VQ Scan 5/25/23:  Low probability of pulmonary embolism.     VQ scan 03/11/24  Findings:   The perfusion images demonstrate photopenia region in the right upper  chest is likely artifact from pacing device. Otherwise normal  perfusion study.                                                             Impression:  No pulmonary embolus.   I have personally reviewed the examination and initial interpretation  and I agree with the findings.    CT Chest at OSH:  IMPRESSION:   1.  Segments of cylindrical bronchiectasis with endobronchial mucosal thickening and secretions in the right middle lobe, lingula and both lower lobes as well as patchy airspace opacity in the adjacent lung parenchyma consistent with bronchopneumonia.   2.  Hyperinflation of the lungs and moderate mid and upper lung centrilobular emphysema.   3.  Moderate cardiomegaly and severe three-vessel coronary artery calcification.  4.  Enlargement central pulmonary arteries (MPA diameter 4.6 cm) consistent with chronic pulmonary arterial hypertension.     LABS Reviewed  Recent Labs   Lab 03/12/24  1449 03/12/24  1235 03/12/24  1011 03/12/24  0542 03/11/24  1620 03/11/24  0617 03/10/24  0623 03/09/24  2315   WBC  --   --   --  14.3*  --  15.1* 10.7  --    HGB 11.0*  --   --  10.6*  --  11.8* 12.9*  --    MCV  --   --   --  100  --  100 101*  --    PLT  --   --   --  163  163  --  152 152  --    INR  --   --   --  2.10*  --  3.25* 3.05* 3.08*   NA  --  135  --   --  139 139  138  --  140   POTASSIUM  --  3.3*  --   --  3.4 3.6  3.4 3.8 3.3*   CHLORIDE  --  94*  --   --  95* 95*  95*  --  94*   CO2  --  25  --   --  28 21*  27  --  31*   BUN  --  77.0*  --   --  71.1* 64.7*  68.5*  --  63.5*   CR  --  2.14*   --   --  2.24* 2.39*  2.36*  --  2.48*   ANIONGAP  --  16*  --   --  16* 23*  16*  --  15   AMBIKA  --  9.5  --   --  9.7 9.8  9.7  --  10.2   GLC  --  170* 168*  --  170* 167*  175*  --  135*   ALBUMIN  --   --   --   --   --  3.9  --  4.3   PROTTOTAL  --   --   --   --   --  7.0  --  7.5   BILITOTAL  --   --   --   --   --  1.8*  --  1.7*   ALKPHOS  --   --   --   --   --  66  --  77   ALT  --   --   --   --   --  13  --  13   AST  --   --   --   --   --  32  --  33       IMAGES Reviewed    Recent Results (from the past 24 hour(s))   Cardiac Catheterization    Narrative      Right sided filling pressures are mildly elevated.    Left sided filling pressures are normal.    Moderately elevated pulmonary artery hypertension.    Reduced cardiac output level.    Hemodynamic data has been modified in Epic per physician review.

## 2024-03-12 NOTE — PROGRESS NOTES
PULMONARY CONSULTATION: Progress Note     Patient:  Buck Sinclair   Date of birth 1945, Medical record number [id number]  Date of Visit:  03/12/2024  Date of Admission: 3/9/2024  Consult Requester: Velia Cabrera MD  Reason for Consult: To assess COPD severity          Assessment and Recommendations:     ASSESSMENT:  Mr. Buck Sinclair is a 79 y.o. male patient with PMHx of chronic respiratory failure on 6-8L NC home O2 at baseline, Pulmonary HTN (last RHC in 2022 with mPAP= 37mmHg, repeat RHC this admission), CKD IIIA, CAD s/p CANDELARIA x3 to the fpnnuaku-hr-yanjma LAD (9/2017), longstanding persistent AF s/p multiple electrical cardioversions and extensive catheter ablations x2 in 2011 (on coumadin), ischemic cardiomyopathy s/p Medtronic dual-chamber PPM in 1999 replaced with a Medtronic dual-chamber implantable cardiac defibrillator on 6/11/2014, MDD, HLD. He was admitted on 3/8/2024 with acute on chronic RS failure and acute renal failure with concerns for possible over diuresis and was transferred to [facility] for a RHC. He has been on home oxygen since 4 years, initially on 2-4L NC, but slowly progressed to a baseline of 6-8L NC. Currently he is requiring HFNC with 100% FiO2 at 50LPM.    PROBLEM LIST AND DISCUSSION:     # Acute on chronic hypoxic and hypercarbic respiratory failure  # COPD Gold E  # Pulmonary HTN- Moderate precapillary PHTN; likely Group 3 (2/2 COPD) vs Group 1 (2/2 possible scleroderma)  The patient has COPD Gold E and is under adequate treatment for it. Per his PFTs dated 10/7/22, FEV1/FVC is 61%, significant for obstructive pathology. CT (3/9) shows hyperinflation of the lungs and moderate mid and upper lung centrilobular emphysema. Overall his COPD seems to have remained the same and is not progressing. The reasons for his current exacerbation may be PE, infections, and volume overload. Pulmonary embolism has been ruled out by NM VQ scan. Would also like to rule out infective  pathologies given his acute exacerbation, and will be sending a complete viral panel and sputum cultures on him. Patient was euvolemic on exam. Lastly, the exacerbation could be due to worsening PHTN. Awaiting RHC for him.    Per the PFTs from 2022 he also has overall decreased FVC and also a decreased TLC, suggestive of restrictive pattern. This makes us think in terms of ILD and other restrictive pathologies. Given his pulmonary HTN, possible that he has underlying systemic sclerosis leading to Group 1 PHTN, in addition to the COPD related Group 3 PHTN. Esophageal dysmotility is another component of systemic sclerosis, and he seems to be having this feature. His CT does show esophageal dilatation and history is also consistent with swallowing difficulty, with food feeling stuck in his throat at times. No known history of autoimmune disease. No obvious skin changes per our examination. We would like to further investigate for ILD, keeping systemic sclerosis under high suspicion, and will be sending a full ILD panel on him.     # Ischemic cardiomyopathy with recovered LV function and mildly reduced RV function  # Essential HTN   # CAD- s/p LAD PCI/CANDELARIA 9/20/2017    # CHRISSY on CKD3a- likely 2/2 overdiuresis- resolving  # Hypokalemia- resolved    RECOMMENDATION:  Continue Methylprednisone 125 mg Q8H  ILD Panel- pending some results    Thank you for this consult.    Patient was discussed with Dr. Talita Perez.     Sai BRISCOE  Medical Student  ________________________________________________________________    Consult Question: To assess COPD severity.  Admission Diagnosis: Pulmonary hypertension (H) [I27.20]         Interval History:   No overnight events. Patient feels at his baseline. No worsening dyspnea. No fevers or chills. RHC today.         History of Present Illness:     Mr. Buck Sinclair is a 79 y.o. male patient with PMHx of chronic respiratory failure on 6-8L NC home O2 at baseline, Pulmonary HTN (last  RHC in 2022 with mPAP= 37mmHg, repeat RHC this admission), CKD IIIA, CAD s/p CANDELARIA x3 to the vnclnoue-lj-grmjzr LAD (9/2017), longstanding persistent AF s/p multiple electrical cardioversions and extensive catheter ablations x2 in 2011 (on coumadin), ischemic cardiomyopathy s/p Medtronic dual-chamber PPM in 1999 replaced with a Medtronic  dual-chamber implantable cardiac defibrillator on 6/11/2014, MDD, HLD who was admitted on 3/8/2024 with acute on chronic RS failure and acute renal failure with concerns for possible over diuresis who was transferred for a RHC. Patient notes he has been having increased SOB with increased aggressive outpatient diuresis, he had a worsening creatinine causing him to present to the ED. He received 500cc of IV fluids in the ED. His home diuretics were held due to his CHRISSY. As patient had increased oxygen requirements to 70% fio2 on 50LPM pulm saw him and recommended transfer to the [place] for RHC.  The patient does admit to having smoked earlier in his life, however he hasn't smoked in many years. 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 No other significant exposures.         Review of Systems:   CONSTITUTIONAL:  No fevers or chills  EYES: negative for icterus  ENT:  negative for hearing loss, tinnitus, complains of sore throat after HFNC was started  RESPIRATORY:  cough with sputum present, and dyspnea present and requiring O2 at rest  CARDIOVASCULAR:  negative for chest pain, dyspnea present  GASTROINTESTINAL:  negative for nausea, vomiting, diarrhea and constipation  GENITOURINARY:  negative for dysuria  HEME:  No easy bruising  INTEGUMENT:  negative for rash and pruritus  NEURO:  Negative for headache         Past Medical History:     Past Medical History:   Diagnosis Date    Anemia     Asthma without status asthmaticus 05/05/2021    Atrial  fibrillation (H)     BPH (benign prostatic hyperplasia)     Cardiomyopathy (H)     CKD (chronic kidney disease) stage 3, GFR 30-59 ml/min (H) 05/24/2023    Congestive heart failure (H)     COPD, group B, by GOLD 2017 classification (H)     Coronary artery disease due to calcified coronary lesion     Dyslipidemia, goal LDL below 70     Essential hypertension     Heart failure with reduced ejection fraction (H) 08/17/2023    Hemoptysis 10/04/2017    History of transfusion     Hyperlipidemia     Persistent atrial fibrillation (H)     Pneumonia of left lower lobe due to infectious organism 10/04/2017    Pulmonary hypertension (H)     Skin cancer of trunk     Status post catheter ablation of atrial fibrillation 06/07/2017    PVI 4-2011 (Cryo/PVI + roof line + CTI line) Re-do PVI 7-2011 (RFA/PVI + CFE + VIDYA + confirmed CTI line)    Ventricular tachycardia (H)             Past Surgical History:     Past Surgical History:   Procedure Laterality Date    CARDIAC DEFIBRILLATOR PLACEMENT      CARDIOVERSION  07/11/2018    x20, last 2/12/15, 10/2015, 11/18/16, 6/16/17 by Lauren Foster CNP    CARDIOVERSION  07/11/2018    CARDIOVERSION  11/19/2021    COLONOSCOPY N/A 04/28/2017    Procedure: COLONOSCOPY with 2 ascending polyps and 1 transverse polyp;  Surgeon: Jose Whittington MD;  Location: [facility];  Service:     CORONARY ANGIOGRAPHY ADULT ORDER      CV CORONARY ANGIOGRAM N/A 09/20/2017    Procedure: Coronary Angiogram;  Surgeon: Sergio Cervantes MD;  Location: [facility] Cath Lab;  Service:     CV CORONARY ANGIOGRAM N/A 01/24/2022    Procedure: Coronary Angiogram;  Surgeon: Christi Saunders MD;  Location: [facility] LAB CV    CV LEFT HEART CATH N/A 01/24/2022    Procedure: Left Heart Cath;  Surgeon: Christi Saunders MD;  Location: [facility] LAB CV    CV RIGHT AND LEFT HEART CATH N/A 01/24/2022    Procedure: Right and Left Heart Catherization;  Surgeon: Christi Saunders MD;  Location: [facility] LAB CV    EP ICD  GENERATOR REPLACEMENT DUAL N/A 10/28/2022    Procedure: Implantable Cardioverter Defibrillator Generator Replacement Dual;  Surgeon: Elo Collins MD;  Location: [facility] CATH LAB CV    EP ICD INSERT      FRACTURE SURGERY Left     wrist    HEART CATH, ANGIOPLASTY      IMPLANT AUTOMATIC IMPLANTABLE CARDIOVERTER DEFIBRILLATOR      INGUINAL HERNIA REPAIR Left     while in the [employer] in [place] after 13 month in [place]    INSERT / REPLACE / REMOVE PACEMAKER      IR MISCELLANEOUS PROCEDURE  2014    OTHER SURGICAL HISTORY      left hand surgery---tendon repair    MA ABLATE HEART DYSRHYTHM FOCUS  2011    Catheter Ablation Atrial Fibrillation PVI 2011 (Cryo+RF-PVI + roof line + CTI line)    MA ABLATE HEART DYSRHYTHM FOCUS  2011    Re-do PVI 2011 (RFA-PVI + CFE + VIDYA + confirmation of CTI line)    TOTAL SHOULDER REPLACEMENT Right 2016    Dr. Abernathy of [facility]    WRIST SURGERY Left     ZZC MYERS W/O FACETEC FORAMOT/[facility]KC  VRT SEG, CERVICAL      Laminectomy Lumbar;  Recorded: 2012;            Family History:   Reviewed and non-contributory.   Family History   Problem Relation Age of Onset    Cancer Mother         leukemia    Cancer Father         bladder    Cancer Sister         breast with lung met.    Aneurysm Sister     CABG Brother     CABG Brother     Valvular heart disease Brother         valve replacement            Social History:     Social History     Tobacco Use    Smoking status: Former     Packs/day: 1.00     Years: 4.00     Additional pack years: 0.00     Total pack years: 4.00     Types: Cigarettes     Quit date: 1968     Years since quittin.2    Smokeless tobacco: Never   Substance Use Topics    Alcohol use: Yes     Alcohol/week: 2.0 standard drinks of alcohol     Comment: Alcoholic Drinks/day: 1 beer per week     History   Sexual Activity    Sexual activity: Yes    Partners: Female    Birth control/ protection: Post-menopausal             Current Medications:      acetylcysteine  4 mL Nebulization Q4H    And    albuterol  2.5 mg Nebulization Q4H    aspirin  81 mg Oral Daily    [Held by provider] atorvastatin  40 mg Oral QPM    cefTRIAXone  2 g Intravenous Q24H    empagliflozin  10 mg Oral Daily    FLUoxetine  20 mg Oral Daily    fluticasone  1 puff Inhalation Daily    fluticasone-vilanterol  1 puff Inhalation QPM    fluticasone-vilanterol  1 puff Inhalation Daily    ipratropium - albuterol 0.5 mg/2.5 mg/3 mL  3 mL Nebulization 4x daily    methylPREDNISolone  62.5 mg Intravenous Q12H    metoprolol succinate ER  25 mg Oral Daily    polyethylene glycol  17 g Oral Daily    sodium chloride (PF)  3 mL Intracatheter Q8H    umeclidinium  1 puff Inhalation Daily    vancomycin  1,000 mg Intravenous Q24H    vitamin C  1,000 mg Oral Daily    vitamin D2  50,000 Units Oral Once per day on Monday Thursday            Allergies:     Allergies   Allergen Reactions    Adhesive Tape Other (See Comments)     ADHESIVE TAPE; SKIN IRRITATION; Skin pulled off with foam tape      Amiodarone      ADVERSE REACTION.  Sunlight sensitivity.    Lisinopril             Physical Exam:   Vitals were reviewed  Patient Vitals for the past 24 hrs:   BP Temp Temp src Pulse Resp SpO2 Weight   03/12/24 1600 -- 98  F (36.7  C) Oral -- -- -- --   03/12/24 1501 -- -- -- -- 20 -- --   03/12/24 1449 -- -- -- -- 20 -- --   03/12/24 1430 -- -- -- -- 18 -- --   03/12/24 1200 110/75 97.9  F (36.6  C) Oral 96 18 97 % --   03/12/24 1100 103/69 -- -- 94 13 97 % --   03/12/24 1000 104/77 -- -- 98 18 (!) 89 % --   03/12/24 0900 111/86 -- -- 93 16 (!) 88 % --   03/12/24 0800 113/81 98  F (36.7  C) Oral 85 18 94 % --   03/12/24 0700 91/49 -- -- 86 18 97 % --   03/12/24 0616 -- -- -- -- -- -- 99 kg (218 lb 4.1 oz)   03/12/24 0600 (!) 84/59 -- -- 93 (!) 38 99 % --   03/12/24 0500 104/74 -- -- 91 24 92 % --   03/12/24 0400 (!) 89/57 98.5  F (36.9  C) Axillary 96 17 98 % --   03/12/24 0300 105/75 -- -- 88 18 99  % --   03/12/24 0100 105/65 -- -- 99 15 98 % --   03/12/24 0030 111/77 -- -- 91 15 98 % --   03/12/24 0000 (!) 83/41 98.6  F (37  C) Axillary 93 17 96 % --   03/11/24 2300 (!) 83/73 -- -- 100 24 98 % --   03/11/24 2200 107/71 -- -- 96 11 98 % --   03/11/24 2100 106/79 -- -- 101 13 98 % --   03/11/24 2000 103/62 96.9  F (36.1  C) Axillary 97 19 96 % --   03/11/24 1800 105/63 -- -- 102 20 95 % --       Physical Examination:  GENERAL:  well-developed, well-nourished, in bed in no acute distress.   HEENT:  Head is normocephalic, atraumatic   EYES:  Eyes have anicteric sclerae without conjunctival injection or stigmata of endocarditis.    ENT:  Oropharynx is moist without exudates or ulcers. Tongue is midline  NECK:  Supple. No cervical lymphadenopathy  LUNGS:  Clear to auscultation bilateral.   CARDIOVASCULAR:  Regular rate and rhythm with no murmurs, gallops or rubs.  ABDOMEN:  Normal bowel sounds, soft, nontender. No appreciable hepatosplenomegaly  SKIN:  No acute rashes.  Line(s) are in place without any surrounding erythema or exudate. No stigmata of endocarditis.  NEUROLOGIC:  Grossly nonfocal. Active x4 extremities         Laboratory Data:     Inflammatory Markers    Recent Labs   Lab Test 03/11/24  1028 02/15/22  0941   SED 56*  --    CRP  --  <2.9       Hematology Studies    Recent Labs   Lab Test 03/12/24  1449 03/12/24  0542 03/11/24  0617 03/10/24  0623 03/08/24  1334 06/01/23  0539 05/25/23  0444   WBC  --  14.3* 15.1* 10.7 8.3 12.6* 10.9   HGB 11.0* 10.6* 11.8* 12.9* 13.2* 11.7* 12.2*   MCV  --  100 100 101* 100 100 99   PLT  --  163  163 152 152 176 140* 134*       Metabolic Studies     Recent Labs   Lab Test 03/12/24  1235 03/11/24  1620 03/11/24  0617 03/10/24  0623 03/09/24  2315 03/09/24  1358 03/09/24  0510    139 139  138  --  140  --  139   POTASSIUM 3.3* 3.4 3.6  3.4 3.8 3.3*   < > 2.7*   CHLORIDE 94* 95* 95*  95*  --  94*  --  94*   CO2 25 28 21*  27  --  31*  --  33*   BUN 77.0* 71.1*  "64.7*  68.5*  --  63.5*  --  66.5*   CR 2.14* 2.24* 2.39*  2.36*  --  2.48*  --  2.70*   GFRESTIMATED 31* 29* 27*  27*  --  26*  --  23*    < > = values in this interval not displayed.       Hepatic Studies    Recent Labs   Lab Test 03/11/24  0617 03/09/24  2315 05/20/23  0427 05/19/23  0446 05/18/23  0855 02/15/22  0941 11/17/21  1648   BILITOTAL 1.8* 1.7*  --  1.0 1.0 0.5 1.0   ALKPHOS 66 77  --  56 69 56 48   ALBUMIN 3.9 4.3 3.3* 3.3* 3.7 3.3* 3.8   AST 32 33  --  24 24 23 23   ALT 13 13  --  15 15 24 16       Microbiology:  No results found for: \"CULT\"    Respiratory Virus Testing    No results found for: \"RS\", \"FLUAG\"       CT CHEST (3/9):  IMPRESSION:   1.  Segments of cylindrical bronchiectasis with endobronchial mucosal thickening and secretions in the right middle lobe, lingula and both lower lobes as well as patchy airspace opacity in the adjacent lung parenchyma consistent with bronchopneumonia.   2.  Hyperinflation of the lungs and moderate mid and upper lung centrilobular emphysema.   3.  Moderate cardiomegaly and severe three-vessel coronary artery calcification.  4.  Enlargement central pulmonary arteries (MPA diameter 4.6 cm) consistent with chronic pulmonary arterial hypertension.        NM LUNG SCAN PERFUSION PARTICULATE (3/11)  Impression: Low probability of pulmonary embolism based on this perfusion only study.    RHC (3/12):  PCWP: 6 mmHg       PA: 57/20/35 mmHg (s/d/m)      RV: 57/5 mmHg (s/ed)      RA: --/17/11 mmHg      CO 4.15 L/min (thermodilution); 4.51 L/min (Shikha)      CI: 1.83 L/min/m2 (TD); 1.98 L/min/m2 (Shikha)  PVR: 6.9 (TD) Wood Units   Right sided filling pressures are mildly elevated. Left sided filling pressures are normal. Moderately elevated pulmonary artery hypertension. Reduced cardiac output level. Hemodynamic data has been modified in Epic per physician review.     Fellow Attestation  I was present with the medical student, who participated in the service and in the " documentation of the note.  I have verified the history and personally performed the physical exam and medical decision making.  I agree with the assessment and plan of care as documented in the note. Appreciate Rheumatology involvement. Staffed with Dr. Perez.
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
## 2024-03-12 NOTE — PROGRESS NOTES
SLP: Clinical swallow orders received; however, pt currently NPO for RHC and requiring BiPAP. ST will re-assess as appropriate.

## 2024-03-13 NOTE — PHARMACY-ANTICOAGULATION SERVICE
Clinical Pharmacy - Warfarin Dosing Consult     Pharmacy has been consulted to manage this patient s warfarin therapy.  Indication: Atrial Fibrillation  Therapy Goal: INR 2-3  Provider/Team: Cards 2  Warfarin Prior to Admission: Yes  Warfarin PTA Regimen: Warfarin 2.5 mg daily  Significant drug interactions: no new drug-drug interactions    INR   Date Value Ref Range Status   03/12/2024 2.10 (H) 0.85 - 1.15 Final   03/11/2024 3.25 (H) 0.85 - 1.15 Final       Recommend warfarin 2 mg today.  Pharmacy will monitor Buck Sinclair daily and order warfarin doses to achieve specified goal.      Please contact pharmacy as soon as possible if the warfarin needs to be held for a procedure or if the warfarin goals change.

## 2024-03-13 NOTE — PLAN OF CARE
ICU End of Shift Summary. See flowsheets for vital signs and detailed assessment.    Changes this shift: VSS. Afebrile. A/Ox4. Difficulty hearing at baseline. Titrating FiO2 on HFNC/BiPAP for SpO2 between 88-92%. Good UO with bumex x1 given. Good appetite on regular diet. No BM this shift. No new skin concerns, no acute events this shift.     Plan: Continue plan of care, adjust as needed.      Goal Outcome Evaluation:      Plan of Care Reviewed With: patient    Overall Patient Progress: improvingOverall Patient Progress: improving    Outcome Evaluation: see note

## 2024-03-13 NOTE — PROGRESS NOTES
03/13/24 0900   Appointment Info   Signing Clinician's Name / Credentials (SLP) SMILEY Sanchez student   Student Supervision Direct supervision provided   General Information   Onset of Illness/Injury or Date of Surgery 03/09/24   Referring Physician Velia Cabrera MD   Pertinent History of Current Problem 79 y.o. male patient with PMH chronic RS failure on 6-8L NC, PHTN, CKD IIIA, CAD s/p CANDELARIA x3 to the qfpusyiy-ei-cfxnur LAD (9/2017), longstanding persistent AF s/p multiple electrical cardioversions and extensive catheter ablations x2 in 2011 (on coumadin), ischemic cardiomyopathy s/p Medtronic dual-chamber PPM in 1999 replaced with a Medtronic dual-chamber implantable cardiac defibrillator on 6/11/2014, MDD, HLD who was admitted on 3/8/2024 with acute on chronic RS failure and acute renal failure with concerns for possible over diuresis who was transferred for a RHC. Clinical swallow eval completed per MD orders.   Type of Evaluation   Type of Evaluation Swallow Evaluation   Oral Motor   Oral Musculature generally intact   Structural Abnormalities none present   Dentition (Oral Motor)   Dentition (Oral Motor) adequate dentition   Facial Symmetry (Oral Motor)   Facial Symmetry (Oral Motor) WNL   Lip Function (Oral Motor)   Lip Range of Motion (Oral Motor) protrusion impairment   Comment, Lip Function (Oral Motor) Generalized weakness   Tongue Function (Oral Motor)   Tongue ROM (Oral Motor) WNL   Jaw Function (Oral Motor)   Jaw Function (Oral Motor) WNL   Cough/Swallow/Gag Reflex (Oral Motor)   Soft Palate/Velum (Oral Motor) WNL   Volitional Throat Clear/Cough (Oral Motor) WNL   Vocal Quality/Secretion Management (Oral Motor)   Vocal Quality (Oral Motor) WNL   Secretion Management (Oral Motor) WNL   General Swallowing Observations   Past History of Dysphagia None per chart review. Pt reported intermittent choking with pills.   Respiratory Support high-flow;nasal cannula  (100% FiO2, 50LPM)   Current  Diet/Method of Nutritional Intake (General Swallowing Observations, NIS) regular diet;thin liquids (level 0)   Swallowing Evaluation Clinical swallow evaluation   Clinical Swallow Evaluation   Feeding Assistance minimal assistance required   Clinical Swallow Evaluation Textures Trialed thin liquids;pureed;solid foods   Clinical Swallow Eval: Thin Liquid Texture Trial   Mode of Presentation, Thin Liquids straw;self-fed   Volume of Liquid or Food Presented 3 oz   Oral Phase of Swallow WFL   Pharyngeal Phase of Swallow intact   Diagnostic Statement No overt s/sx of aspiration   Clinical Swallow Evaluation: Puree Solid Texture Trial   Mode of Presentation, Puree spoon;self-fed   Volume of Puree Presented 3 tbsp   Oral Phase, Puree WFL   Pharyngeal Phase, Puree intact   Diagnostic Statement No overt s/sx of aspiration   Clinical Swallow Evaluation: Solid Food Texture Trial   Mode of Presentation self-fed   Volume Presented 3 grapes   Oral Phase WFL   Pharyngeal Phase intact   Diagnostic Statement No overt s/sx of aspiration. Functional time for matication. No oral residue   Esophageal Phase of Swallow   Patient reports or presents with symptoms of esophageal dysphagia Yes   Esophageal comments Pt hx of GERD/reflux   Swallowing Recommendations   Diet Consistency Recommendations regular diet;thin liquids (level 0)   Supervision Level for Intake close supervision needed   Mode of Delivery Recommendations bolus size, small;slow rate of intake;food moistened   Postural Recommendations none   Swallowing Maneuver Recommendations alternate food and liquid intake   Monitoring/Assistance Required (Eating/Swallowing) stop eating activities when fatigue is present;monitor for cough or change in vocal quality with intake   Recommended Feeding/Eating Techniques (Swallow Eval) maintain upright sitting position for eating;maintain upright posture during/after eating for 30 minutes;minimize distractions during oral intake;provide oral  hygiene prior to intake;provide 6 smaller meals throughout day   Medication Administration Recommendations, Swallowing (SLP) Meds OK served whole in puree   Instrumental Assessment Recommendations instrumental evaluation not recommended at this time  (Pt may benefit from eventual insrumental swallow evaluation pending pulm status and risk for silent aspiration given COPD)   General Therapy Interventions   Planned Therapy Interventions Dysphagia Treatment   Dysphagia treatment Instruction of safe swallow strategies   Clinical Impression   Criteria for Skilled Therapeutic Interventions Met (SLP Eval) Yes, treatment indicated   SLP Diagnosis Mild oropharyngeal dysphagia   Risks & Benefits of therapy have been explained evaluation/treatment results reviewed;care plan/treatment goals reviewed;risks/benefits reviewed;current/potential barriers reviewed;participants voiced agreement with care plan;participants included;patient   Clinical Impression Comments Clinical swallow eval completed per MD orders. Pt presents with mild oropharyngeal dysphagia in the setting of COPD exacerbation and elevated O2 needs. Pt reported no hx of swallowing difficulty. Pt reported he typically requires 6-8 L/min O2 via NC at baseline, however pt currently on HFNC (100% FiO2 with 50LPM). Oral mech exam unremarkable. Pt assessed with thin liquids via straw, pureed, and regular solid textures with no overt s/sx of aspiration. Oral phase with solid tetxtures c/b functional time for mastication and no oral residue. VSS across PO trials. Recommend pt continue regular diet and thin liquids with close supervision. Meds ok whole in applesause or pudding. Pt should be upright and alert for all PO, take small bites/sips, alternate between consistencies, and slow pacing. Pt may benefit from eventual instrumental swallow eval d/t high risk for silent aspiration given COPD and elevated O2 requirments. Training provided re: esophageal/GERD percautions,  rationale for instrumental swallow eval, and safe swallow strategies. ST to follow for diet tolerance and potential need for instrumental swallow evaluation.   SLP Discharge Recommendation home with assist   SLP Rationale for DC Rec Pt near baseline for oropharyngeal swallowing skills   SLP Brief overview of current status  Recommend pt continue regular diet and thin liquids with close supervision. Meds ok whole in applesause or pudding. Pt should be upright and alert for all PO, take small bites/sips, alternate between consistencies, and slow pacing. Pt may benefit from eventual instrumental swallow eval d/t high risk for silent aspiration given COPD and elevated O2 requirments.   Total Session Time   Total Session Time (sum of timed and untimed services) 12

## 2024-03-13 NOTE — PLAN OF CARE
Major Shift Events: Provider notified for low BP while patient was sleeping.     Neuro: A&Ox4. [place] in both ears, use of hearing aids. Denied pain. Call light appropriate. PERRL. Up with assist of 1.  CV: Afib 80-90s. Afebrile. MAP goal >65. MAP of 51 while patient asleep, provider notified, no orders obtained. BP returned to normal while awake.  Resp: BiPAP 70% FiO2, during day HFNC 100% 55LPM. Infrequent cough, minimal tan/pink/jose martin sputum.  GI: Regular diet. Small BM at beginning of shift.   : WDL  Skin: Scabs on shins and generalized bruising on arms.  IV: R PIV        Plan: Continue with Abx, steroids, and O2 support.     For complete assessment and vital data see flowsheets.    Goal Outcome Evaluation:      Plan of Care Reviewed With: patient    Overall Patient Progress: no changeOverall Patient Progress: no change    Outcome Evaluation: Unable to wean from oxygen. 70% FiO2 on BiPAP overnight.

## 2024-03-13 NOTE — PROGRESS NOTES
Care Management Follow Up    Length of Stay (days): 4    Expected Discharge Date:  TBD     Concerns to be Addressed: portable oxygen  Patient plan of care discussed at interdisciplinary rounds: Yes    Anticipated Discharge Disposition: Home, Outpatient Rehab (PT, OT, SLP, Cardiac or Pulmonary)     Anticipated Discharge Services: None  Anticipated Discharge DME: Oxygen    Patient/family educated on Medicare website which has current facility and service quality ratings:  yes  Education Provided on the Discharge Plan:  yes  Patient/Family in Agreement with the Plan:      Referrals Placed by CM/SW:  Home oxygen  Private pay costs discussed: Not applicable    Additional Information:  Pt spoke to SW about home oxygen needs, which SW relayed to writer. Pt is currently on 6-8L O2 at baseline, using a concentrator in the home and a portable unit for travel. Pt reports that the portable unit, when not plugged in (such as during travel, plugged into car) does not provide a continuous flow and the flow is not adequate for pt's needs. Pt would prefer to have tanks as well as this portable unit, as the tanks would provide continuous flow (but need to be refilled often) and the portable unit would otherwise be sufficient for traveling needs. Pt reports his current O2 supplier is [employer] and [employer] reported they could not provide both methods of portable oxygen delivery (tanks and the portable unit) due to insurance reimbursement from Medicare. They additionally do not have an option for pt's to pay OOP, so can not provide any O2 equipment other than what insurance will cover. Writer called [facility] to inquire if this would be their policy also and if pt could potentially pay OOP for oxygen supplies.     [facility] PH: [phone] x 2 x 1    ADDENDUM: Per [facility], they are unable to sell equipment directly to pt's at this time due to inventory. [facility] liaison did provide writer with information  the [facility] oxygen refill machine which pt can purchase online. Writer provided this information to pt.     Jane May RNCC  Covering for 4A/4C  Phone [phone]  Pager [phone]      RN Care Coordinator     Social Work and Care Management Department      SEARCHABLE in [facility] - search CARE COORDINATOR       [place] ([phone]) Saturday & Sunday; [phone]) [facility] Recognized Holidays     Units: 5A Onc Vocera & 5C Vocera Pager: [phone]    Units: 6B Vocera & 6C Vocera  Pager: [phone]    Units: 7A SOT RNCC Vocera, 7B Med Surg Vocera, & 7C Med Surg Vocera  Pager: [phone]    Units: 6A Vocera & 4A CVICU Vocera, 4C MICU Vocera, and 4E SICU Vocera   Pager: [phone]    Units: 5 Ortho Vocera & 5 Med Surg Vocera  Pager: [phone]    Units: 6 Med Surg Vocera & 8 Med Surg Vocera  Pager [phone]

## 2024-03-13 NOTE — PROGRESS NOTES
Brief MICU Progress Note    Subjective:    79 y.o. male patient with PMH chronic RS failure on 6-8L NC, PHTN, CKD IIIA, CAD s/p CANDELARIA x3 to the gcrzkead-cw-grfyac LAD (9/2017), longstanding persistent AF s/p multiple electrical cardioversions and extensive catheter ablations x2 in 2011 (on coumadin), ischemic cardiomyopathy s/p Medtronic dual-chamber PPM in 1999 replaced with a Medtronic dual-chamber implantable cardiac defibrillator on 6/11/2014, MDD, HLD who was admitted on 3/8/2024 with acute on chronic RS failure and acute renal failure with concerns for possible over diuresis.    Was transferred for a RHC, which was consistent with group 3 pulmonary hypertension, which, unfortunately, does not make him a candidate for vasodilators, thus there is no benefit from treprostinil. No PE on VQ scan. Mostly treating for COPD exacerbation with antibiotics, inhalers and O2. Pulmonary medicine was following and requested Rheumatology consult. Discussed with Cardiology team, given no therapy options are available for the patient they requested he be admitted to the MICU for further cares. Pt to transfer in the morning of 3/11, with Cardiology assuming cares until that time.    ASSESSMENT / PLAN:  Plan:   #COPD GOLD E; brief history of tobacco use and inhalational exposure working as a .   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  Acute on chronic hypoxic and hypercarbic respiratory failure.  Currently on HFNC 50LPM at 100 FiO2, not appropriate for the floor. Pulm medicine consulted and recommended treating COPD flare with IV Methyl pred 125mg q8hrs as well as obtaining Viral Panel. Sputum returning with with GPC's so started on CAP coverage by Cards team. Azithro was deferred given cardiac ectopy so advised to start Doxycyclilne for atypical coverage. Repeat Resp panel. Monitor overnight and transfer to MICU in AM.    Dannie Zamorano MD on 3/12/2024 at 8:20 PM  Internal Medicine  Resident, PGY2

## 2024-03-13 NOTE — PROGRESS NOTES
[facility] ICU PROGRESS NOTE  03/13/2024      Date of Service (when I saw the patient): 03/13/2024    ASSESSMENT:   Buck Sinclair is a 79 year old male with a medical history significant for chronic respiratory failure on 6-8L NC, PHTN, CKD IIIA, CAD s/p CANDELARIA x3 to the bsfgxkkb-zg-jfrujj LAD (9/2017), longstanding persistent AF s/p multiple electrical cardioversions and extensive catheter ablations x2 in 2011 (on coumadin), ischemic cardiomyopathy s/p Medtronic dual-chamber PPM in 1999 replaced with a Medtronic dual-chamber implantable cardiac defibrillator on 6/11/2014, MDD, HLD  who was admitted to the [facility] on 3/9/2024 for acute on chronic respiratory failure and acute kidney injury. His hospital stay was complicated by worsening respiratory status requiring transfer to the ICU.     INTERVAL HISTORY:   Notes, labs, vitals reviewed. Patient transferred to ICU late yesterday evening for high respiratory support needs.Overnight, hypotensive while asleep with some SBPs into the 70s. BP returns to normal when patient is awake and is currently holding with an SBP in the low 100s. Patient is currently in afib, rate controlled. Utilized bipap overnight and tolerating well. Cultures returned positive for staph aureus. Otherwise, resting comfortably without complaint this morning.     CHANGES and MAJOR THINGS TODAY:   Taper Methylpred today  Prednisone taper beginning tomorrow  ABG on bipap  Protonix as GI Prophylaxis given pulmonary disease  Video Swallow evaluation for silent aspiration  Resume Bumex at half dose, 1 mg BID  Narrow antibiotics for Levaquin   Lower Extremity US to rule out DVT    PLAN:    Neuro:   #Depression  ~ Continue PTA prozac     CV:  #Ischemic cardiomyopathy with recovered LV function and mildly reduced RV function  Longstanding persistent AF s/p multiple electrical cardioversions and extensive catheter ablations x2 in 2011 (on coumadin)  #Essential  "hypertension.  #CAD - LAD PCI/CANDELARIA 9/20/2017   ECHO 5/23: EF 55-60% but severe pulmonary HTN    ECHO 3/24: EF 55-60% no significant changes, severe RV dilation with moderately reduced function  Right Heart Cath 3/24: Moderately elevated pulmonary hypertension with mildly elevated right sided filling pressures.    Plan:  ~ MAP > 65  ~ Continuous Cardiac Monitoring  ~ Continue aspirin, statin        Pulm:  #COPD GOLD E; brief history of tobacco use and inhalational exposure working as a .   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  #Acute on chronic hypoxic and hypercarbic respiratory failure  # Combined Pulmonary Fibrosis and Emphysema   VQ Scan 3/11/24 Negative for PE  CT 3/9/24 with \"Segments of cylindrical bronchiectasis with endobronchial mucosal thickening as well as patchy airspace opacity in the adjacent lung parenchyma consistent with bronchopneumonia\".  Plan:  ~ Continue PTA Spiriva, Symbicort, albuterol  ~ Supplemental oxygen to keep SpO2>89%  ~ Consulted Pulm, appreciare rec's  Methylpred, 62.5 mg IV today  Prednisone taper x 2 weeks  If prednisone taper is extended beyond two weeks or patient is discharged home prior to complete, will need to consider PJP prophylaxis       ~ ABG today on bipap to allow for setting adjustments    GI/Nutrition:  No current concerns    Plan:  ~ Diet: Regular  ~ Speech Consulted, appreciate recs  ~ Okay for Diet, difficulties mostly with swallowing pills in the AM, will take with apple sauce  ~ Request Video swallow evaluation  ~ START protonix, 40 mg Daily     Renal:  #CHRISSY on CKD3a   # Metabolic Acidosis  Baseline Cr 1.63 11/23 and slow climb since 3/4 from 2/27 to 3.03 with increase in diuresis.  #Hypokalemia     Plan:  ~ Avoid nephrotoxic meds  ~ Monitor I&O and Daily weights  ~ HOLD PTA Bumex 2 mg BID   ~ Resume Bumex, 1 mg BID, halfing dose give possible over-diuresis and contraction alkalosis   ~ Daily CMP   ~ RN Managed " Electrolyte replacement protocols     ID:  #Community Acquired Pneumonia  #Leukocytosis  Admitted with normal WBC count, peaked at 15.1 on HOD3, now trending down.  Respiratory Viral Panel is Negative  MRSA Nares with positivity of SA DNA  Respiratory culture from 3/11 +3 Staph aureus     Plan:  ~ Current Antibiotics:  Vancomycin (3/11 - 3/13)  Ceftriaxone (3/11 - 3/13)  Doxycycline (3/12 - 3/13)  Levoquin (3/13 - )  ~ Levoquin, likely 5 days to complete a total of 7 day treatment  ~ Imaging most likely suggestive of atypical pneumonia      Heme:  #Chronic Coagulopathy  Patient on coumadin for afib    Plan:  ~ Transfuse with Hgb goal >7  ~ Monitor for S/sx of bleeding  ~ Pharmacy to Dose coumadin  INR goal 2-3  ~ Daily INR + CBC  ~ US Lower Extremity Venous Duplex, bilateral to evaluate for DVT    Endo:  #DM2     Plan:  ~ Keep BG <180  ~ Hypoglycemia protocols  ~ Currently on empagliflozin     MSK/Rheum:  No current concerns  Rheumatology consulted to evaluate for scleroderma vs autoimmune disease.  DONNA and other Rheum labs negative, not likely to be scleroderma      General Cares/Prophylaxis:    DVT Prophylaxis: Warfarin and Pneumatic Compression Devices  GI Prophylaxis: Not indicated  Restraints: Not Indicated  Family Communication: Family updated   Code Status: No CPR- Do NOT Intubate    Lines/tubes/drains:  ~ PIV x 1    Disposition:  ~ Medical ICU    Patient seen and findings/plan discussed with medical ICU staff, Dr. Styles.      I spent 65 minutes providing critical care, the patient was in critical condition due to acute chronic respiratory failure.    EMILIE ElenaACNP  Pager #[phone]  Critical Care  [facility]     ====================================    OBJECTIVE:   1. VITAL SIGNS:   Temp:  [97.7  F (36.5  C)-98  F (36.7  C)] 97.9  F (36.6  C)  Pulse:  [78-98] 78  Resp:  [13-29] 21  BP: ()/(39-88) 101/69  FiO2 (%):  [50 %-100 %] 70 %  SpO2:  [84 %-98 %] 93 %  FiO2 (%): 70  %  Resp: 21    2. INTAKE/ OUTPUT:   I/O last 3 completed shifts:  In: 1215 [P.O.:900; I.V.:315]  Out: 1300 [Urine:1300]    3. Physical Exam  Vitals and nursing note reviewed.   Constitutional:       General: He is sleeping.      Appearance: He is obese.      Interventions: Face mask in place.   HENT:      Head: Normocephalic.      Mouth/Throat:      Mouth: Mucous membranes are dry.      Pharynx: Oropharynx is clear.   Eyes:      Extraocular Movements: Extraocular movements intact.      Conjunctiva/sclera: Conjunctivae normal.      Pupils: Pupils are equal, round, and reactive to light.   Cardiovascular:      Rate and Rhythm: Normal rate. Rhythm irregular.   Pulmonary:      Breath sounds: Examination of the right-upper field reveals rhonchi. Examination of the left-upper field reveals rhonchi. Examination of the right-middle field reveals rhonchi. Decreased breath sounds and rhonchi present.   Abdominal:      General: There is no distension.      Palpations: Abdomen is soft.      Tenderness: There is no abdominal tenderness.   Musculoskeletal:         General: Swelling present. Normal range of motion.      Cervical back: Normal range of motion.      Right lower leg: Edema present.   Neurological:      General: No focal deficit present.      Mental Status: He is oriented to person, place, and time and easily aroused.           4. LABS:   Arterial Blood Gases   Recent Labs   Lab 03/10/24  0604 03/09/24  0914   PH 7.51* 7.51*   PCO2 38 42   PO2 91 62*   HCO3 31* 33*     Complete Blood Count   Recent Labs   Lab 03/12/24  1449 03/12/24  0542 03/11/24  0617 03/10/24  0623 03/08/24  1334   WBC  --  14.3* 15.1* 10.7 8.3   HGB 11.0* 10.6* 11.8* 12.9* 13.2*   PLT  --  163  163 152 152 176     Basic Metabolic Panel  Recent Labs   Lab 03/12/24  1235 03/12/24  1011 03/11/24  1620 03/11/24  0617 03/10/24  0623 03/09/24  2315     --  139 139  138  --  140   POTASSIUM 3.3*  --  3.4 3.6  3.4 3.8 3.3*   CHLORIDE 94*  --  95*  95*  95*  --  94*   CO2 25  --  28 21*  27  --  31*   BUN 77.0*  --  71.1* 64.7*  68.5*  --  63.5*   CR 2.14*  --  2.24* 2.39*  2.36*  --  2.48*   * 168* 170* 167*  175*  --  135*     Liver Function Tests  Recent Labs   Lab 03/12/24  1942 03/12/24  0542 03/11/24  0617 03/10/24  0623 03/09/24  2315   AST  --   --  32  --  33   ALT  --   --  13  --  13   ALKPHOS  --   --  66  --  77   BILITOTAL  --   --  1.8*  --  1.7*   ALBUMIN  --   --  3.9  --  4.3   INR 1.82* 2.10* 3.25* 3.05* 3.08*     Coagulation Profile  Recent Labs   Lab 03/12/24  1942 03/12/24  0542 03/11/24  0617 03/10/24  0623 03/09/24  2315   INR 1.82* 2.10* 3.25* 3.05* 3.08*   PTT  --   --   --  38 40*       5. RADIOLOGY:   Recent Results (from the past 24 hour(s))   Cardiac Catheterization    Narrative      Right sided filling pressures are mildly elevated.    Left sided filling pressures are normal.    Moderately elevated pulmonary artery hypertension.    Reduced cardiac output level.    Hemodynamic data has been modified in Epic per physician review.       Physician Attestation     Patient was independently examined and evaluated. The case was discussed with ISMA Elena and a mutual management plan was agreed upon which is reflected in the above note. 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 He had evidence of acute right heart decompensation, a transthoracic echocardiogram confirmed RV dilation. A right heart cath confirmed elevated RV pressures 57/5  mmHg, PAP 57/20 (35) mmHg. He was being aggressively diuresed and had contraction alkalosis, resulting in compensatory CO2 retention. I reviewed the CT chest images from 03/09/2024 and compared with previous studies. Findings are consistent with upper zones dominant extensive emphysema, basal reticular changes with some traction bronchiectasis suggestive of CPFE. He does have B/L R>L nodular opacities most suggestive of infectious etiology.     I have reviewed changes in critical data from the last 24 hours, including medications, laboratory results, vital signs, active consults and radiograph results.     I formulated and discussed the care plan with the patient s Advanced Practice Provider and MICU team. I agree with the findings and plan of care as documented in this note.    Working diagnosis: COPD exacerbation with atypical pneumonia in context of chronic hypoxic respiratory failure and secondary pulmonary hypertension.  Significant changes in status since my last evaluation and the acute issues managed by me today and their respective supportive interventions ordered by me include:  - Continue BIPAP S 12/8 cm H2O, On FiO2 0.5 with overall fair to good gas exchange. Taper down FiO2 to 0.4 as tolerated with goal SpO2 90-94%. If tolerated, use intermittent BIPAP S during daytime and all time during sleep.  - Decrease systemic steroid dose with a gradual taper over 2 weeks.  - Change antibiotic coverage to single agent Levofloxacin. Pharmacy to dose adjust warfarin.  - Alright to resume enteric feeding now.  - Restart IV diuresis with Bumex 1 mg.    I personally spent 45 minutes of critical care time reviewing labs and imaging, examining the patient, managing the ventilator. This is independent examination and communication of management plan and does not include time spent on procedures or teaching.       Jensen Becerra MD  Pulmonary and Critical Care Medicine  Date of Service (when I saw the patient): 03/13/24

## 2024-03-13 NOTE — PROGRESS NOTES
PULMONARY CONSULTATION: Progress Note     Patient:  Buck Sinclair   Date of birth 1945, Medical record number [id number]  Date of Visit:  03/13/2024  Date of Admission: 3/9/2024  Consult Requester: Velia Cabrera MD  Reason for Consult: To assess COPD severity          Assessment and Recommendations:     ASSESSMENT:  Mr. Buck Sinclair is a 79 y.o. male patient with PMHx of chronic respiratory failure on 6-8L NC home O2 at baseline, Pulmonary HTN (last RHC in 2022 with mPAP= 37mmHg, repeat RHC this admission), CKD IIIA, CAD s/p CANDELARIA x3 to the [id number] LAD (9/2017), longstanding persistent AF s/p multiple electrical cardioversions and extensive catheter ablations x2 in 2011 (on coumadin), ischemic cardiomyopathy s/p Medtronic dual-chamber PPM in 1999 replaced with a Medtronic dual-chamber implantable cardiac defibrillator on 6/11/2014, MDD, HLD. He was admitted on 3/8/2024 with acute on chronic RS failure and acute renal failure with concerns for possible over diuresis and was transferred to [facility] for a RHC. He has been on home oxygen since 4 years, initially on 2-4L NC, but slowly progressed to a baseline of 6-8L NC. Currently he is requiring HFNC with 100% FiO2 at 50LPM.    PROBLEM LIST AND DISCUSSION:     # Acute on chronic hypoxic and hypercarbic respiratory failure  # COPD Gold E  # Pulmonary HTN- Moderate precapillary PHTN; likely Group 3 (2/2 COPD) vs Group 1 (2/2 possible scleroderma or other autoimmune disease)  The patient has COPD Gold E and is under adequate treatment for it. Per his PFTs dated 10/7/22, FEV1/FVC is 61%, significant for obstructive pathology. CT (3/9) shows hyperinflation of the lungs and moderate mid and upper lung centrilobular emphysema. Overall his COPD seems to have remained the same and is not progressing. The reasons for his current exacerbation may be PE, infections, volume overload, and worsening PHTN. Pulmonary embolism has been ruled out by NM DEXTER  scan. PHTN per RHC on 3/12/2024 (mPAP=35) is stable as compared to that on 1/24/2022 (mPAP=37). He was also euvolemic on exam, and the concern previously was for overdiuresis and not volume overload. We sent a complete viral panel and sputum cultures on him. Viral panel negative. However, sputum growing 3+ Staph aureus, sensitivities awaited. Most likely commensal. He is on vancomycin. So that should cover for staph. However, CRP and ESR are elevated, pointing more towards infectious etiology for his current exacerbation.    Per the PFTs from 2022 he also has overall decreased FVC and also a decreased TLC, suggestive of restrictive pattern. This makes us think in terms of ILD and other restrictive pathologies. Given his pulmonary HTN, possible that he has underlying systemic sclerosis leading to Group 1 PHTN, in addition to the COPD related Group 3 PHTN. Esophageal dysmotility is another component of systemic sclerosis, and he seems to be having this feature. His CT does show esophageal dilatation and history is also consistent with swallowing difficulty, with food feeling stuck in his throat at times. No known history of autoimmune disease in him or his family. No obvious skin changes per our examination. Keeping systemic sclerosis under high suspicion, we sent a full ILD panel on him, but came back negative for ANAs, SS-A and B,  Edita-1, RA, and CCP. HSP panel awaited. We appreciate rheumatology recs that it is unlikely to be scleroderma or other autoimmune disease given normal ANAs. Even if there is an autoimmune component, it will be treated by the high dose steroids. Planned for swallowing assessment tomorrow.    # Ischemic cardiomyopathy with recovered LV function and mildly reduced RV function  # Essential HTN   # CAD- s/p LAD PCI/CANDELARIA 9/20/2017    # CHRISSY on CKD3a- likely 2/2 overdiuresis- resolving  # Hypokalemia- resolved    RECOMMENDATION:  Taper methylprednisone gradually and observe for improvement in oxygen  requirement    Thank you for this consult.    Patient was discussed with Dr. Talita Perez.     Sai BRISCOE  Medical Student  ________________________________________________________________    Consult Question: To assess COPD severity.  Admission Diagnosis: Pulmonary hypertension (H) [I27.20]         Interval History:   No overnight events. Patient feels good. Significantly improved the past 2 days since methylpred dose was increased. No worsening dyspnea. No fevers or chills.          History of Present Illness:     Mr. Buck Sinclair is a 79 y.o. male patient with PMHx of chronic respiratory failure on 6-8L NC home O2 at baseline, Pulmonary HTN (last RHC in 2022 with mPAP= 37mmHg, repeat RHC this admission), CKD IIIA, CAD s/p CANDELARIA x3 to the oubkdjgm-iv-[id number] LAD (9/2017), longstanding persistent AF s/p multiple electrical cardioversions and extensive catheter ablations x2 in 2011 (on coumadin), ischemic cardiomyopathy s/p Medtronic dual-chamber PPM in 1999 replaced with a Medtronic  dual-chamber implantable cardiac defibrillator on 6/11/2014, MDD, HLD who was admitted on 3/8/2024 with acute on chronic RS failure and acute renal failure with concerns for possible over diuresis who was transferred for a RHC. Patient notes he has been having increased SOB with increased aggressive outpatient diuresis, he had a worsening creatinine causing him to present to the ED. He received 500cc of IV fluids in the ED. His home diuretics were held due to his CHRISSY. As patient had increased oxygen requirements to 70% fio2 on 50LPM pulm saw him and recommended transfer to the [place] for RHC.  The patient does admit to having smoked earlier in his life, however he hasn't smoked in many years. 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 No other significant exposures.         Review of  Systems:   CONSTITUTIONAL:  No fevers or chills  EYES: negative for icterus  ENT:  negative for hearing loss, tinnitus, complains of sore throat after HFNC was started  RESPIRATORY:  cough with sputum present, and dyspnea present and requiring O2 at rest  CARDIOVASCULAR:  negative for chest pain, dyspnea present  GASTROINTESTINAL:  negative for nausea, vomiting, diarrhea and constipation  GENITOURINARY:  negative for dysuria  HEME:  No easy bruising  INTEGUMENT:  negative for rash and pruritus  NEURO:  Negative for headache         Past Medical History:     Past Medical History:   Diagnosis Date    Anemia     Asthma without status asthmaticus 05/05/2021    Atrial fibrillation (H)     BPH (benign prostatic hyperplasia)     Cardiomyopathy (H)     CKD (chronic kidney disease) stage 3, GFR 30-59 ml/min (H) 05/24/2023    Congestive heart failure (H)     COPD, group B, by GOLD 2017 classification (H)     Coronary artery disease due to calcified coronary lesion     Dyslipidemia, goal LDL below 70     Essential hypertension     Heart failure with reduced ejection fraction (H) 08/17/2023    Hemoptysis 10/04/2017    History of transfusion     Hyperlipidemia     Persistent atrial fibrillation (H)     Pneumonia of left lower lobe due to infectious organism 10/04/2017    Pulmonary hypertension (H)     Skin cancer of trunk     Status post catheter ablation of atrial fibrillation 06/07/2017    PVI 4-2011 (Cryo/PVI + roof line + CTI line) Re-do PVI 7-2011 (RFA/PVI + CFE + VIDYA + confirmed CTI line)    Ventricular tachycardia (H)             Past Surgical History:     Past Surgical History:   Procedure Laterality Date    CARDIAC DEFIBRILLATOR PLACEMENT      CARDIOVERSION  07/11/2018    x20, last 2/12/15, 10/2015, 11/18/16, 6/16/17 by Lauren Foster CNP    CARDIOVERSION  07/11/2018    CARDIOVERSION  11/19/2021    COLONOSCOPY N/A 04/28/2017    Procedure: COLONOSCOPY with 2 ascending polyps and 1 transverse polyp;  Surgeon: Jose MELENDREZ  MD Pk;  Location: [facility];  Service:     CORONARY ANGIOGRAPHY ADULT ORDER      CV CORONARY ANGIOGRAM N/A 09/20/2017    Procedure: Coronary Angiogram;  Surgeon: Sergio Cervantes MD;  Location: [facility] Cath Lab;  Service:     CV CORONARY ANGIOGRAM N/A 01/24/2022    Procedure: Coronary Angiogram;  Surgeon: Christi Saunders MD;  Location: [facility] CATH LAB CV    CV LEFT HEART CATH N/A 01/24/2022    Procedure: Left Heart Cath;  Surgeon: Christi Saunders MD;  Location: [facility] LAB CV    CV RIGHT AND LEFT HEART CATH N/A 01/24/2022    Procedure: Right and Left Heart Catherization;  Surgeon: Christi Saunders MD;  Location: [facility] CATH LAB CV    CV RIGHT HEART CATH MEASUREMENTS RECORDED N/A 3/12/2024    Procedure: Heart Cath Right Heart Cath;  Surgeon: Edgardo Deluna MD;  Location:  HEART CARDIAC CATH LAB    EP ICD GENERATOR REPLACEMENT DUAL N/A 10/28/2022    Procedure: Implantable Cardioverter Defibrillator Generator Replacement Dual;  Surgeon: Elo Collins MD;  Location: [facility] CV    EP ICD INSERT      FRACTURE SURGERY Left     wrist    HEART CATH, ANGIOPLASTY      IMPLANT AUTOMATIC IMPLANTABLE CARDIOVERTER DEFIBRILLATOR      INGUINAL HERNIA REPAIR Left 1967    while in the [employer] in [place] after 13 month in [place]    INSERT / REPLACE / REMOVE PACEMAKER      IR MISCELLANEOUS PROCEDURE  04/30/2014    OTHER SURGICAL HISTORY      left hand surgery---tendon repair    SC ABLATE HEART DYSRHYTHM FOCUS  04/2011    Catheter Ablation Atrial Fibrillation PVI Apr 2011 (Cryo+RF-PVI + roof line + CTI line)    SC ABLATE HEART DYSRHYTHM FOCUS  07/2011    Re-do PVI Jul 2011 (RFA-PVI + CFE + VIDYA + confirmation of CTI line)    TOTAL SHOULDER REPLACEMENT Right 03/03/2016    Dr. Abernathy of [facility]    WRIST SURGERY Left     ZZC MYERS W/O FACETEC FORAMOT/DSKC 1/2 VRT SEG, CERVICAL      Laminectomy Lumbar;  Recorded: 03/09/2012;            Family History:   Reviewed and  non-contributory.   Family History   Problem Relation Age of Onset    Cancer Mother         leukemia    Cancer Father         bladder    Cancer Sister         breast with lung met.    Aneurysm Sister     CABG Brother     CABG Brother     Valvular heart disease Brother         valve replacement            Social History:     Social History     Tobacco Use    Smoking status: Former     Packs/day: 1.00     Years: 4.00     Additional pack years: 0.00     Total pack years: 4.00     Types: Cigarettes     Quit date: 1968     Years since quittin.2    Smokeless tobacco: Never   Substance Use Topics    Alcohol use: Yes     Alcohol/week: 2.0 standard drinks of alcohol     Comment: Alcoholic Drinks/day: 1 beer per week     History   Sexual Activity    Sexual activity: Yes    Partners: Female    Birth control/ protection: Post-menopausal            Current Medications:      acetylcysteine  4 mL Nebulization Q4H    And    albuterol  2.5 mg Nebulization Q4H    aspirin  81 mg Oral Daily    [Held by provider] atorvastatin  40 mg Oral QPM    bumetanide  1 mg Intravenous BID    empagliflozin  10 mg Oral Daily    FLUoxetine  20 mg Oral Daily    fluticasone  1 puff Inhalation Daily    fluticasone-vilanterol  1 puff Inhalation QPM    fluticasone-vilanterol  1 puff Inhalation Daily    levofloxacin  500 mg Oral Daily    metoprolol succinate ER  25 mg Oral Daily    pantoprazole  40 mg Oral QAM AC    polyethylene glycol  17 g Oral Daily    [START ON 3/14/2024] predniSONE  60 mg Oral Daily    Followed by    [START ON 3/16/2024] predniSONE  40 mg Oral Daily    Followed by    [START ON 3/18/2024] predniSONE  20 mg Oral Daily    Followed by    [START ON 3/20/2024] predniSONE  10 mg Oral Daily    Followed by    [START ON 3/22/2024] predniSONE  5 mg Oral Daily    Followed by    [START ON 3/24/2024] predniSONE  2.5 mg Oral Daily    sodium chloride (PF)  3 mL Intracatheter Q8H    umeclidinium  1 puff Inhalation Daily    vitamin C  1,000 mg  Oral Daily    vitamin D2  50,000 Units Oral Once per day on Monday Thursday    warfarin ANTICOAGULANT  2 mg Oral ONCE at 18:00    Warfarin Therapy Reminder  1 each Oral See Admin Instructions            Allergies:     Allergies   Allergen Reactions    Adhesive Tape Other (See Comments)     ADHESIVE TAPE; SKIN IRRITATION; Skin pulled off with foam tape      Amiodarone      ADVERSE REACTION.  Sunlight sensitivity.    Lisinopril             Physical Exam:   Vitals were reviewed  Patient Vitals for the past 24 hrs:   BP Temp Temp src Pulse Resp SpO2 Weight   03/13/24 1300 109/65 -- -- 93 19 95 % --   03/13/24 1225 -- -- -- -- -- 95 % --   03/13/24 1200 -- 97.4  F (36.3  C) Axillary 77 24 98 % --   03/13/24 1100 -- -- -- 87 21 98 % --   03/13/24 1000 -- -- -- 84 20 96 % --   03/13/24 0900 -- -- -- 87 19 93 % --   03/13/24 0825 -- -- -- -- -- 95 % --   03/13/24 0800 94/70 97.4  F (36.3  C) Axillary 94 14 90 % --   03/13/24 0700 -- -- -- 87 21 94 % --   03/13/24 0600 -- -- -- 78 21 93 % 99.4 kg (219 lb 3.2 oz)   03/13/24 0500 -- -- -- 89 20 94 % --   03/13/24 0400 101/69 97.9  F (36.6  C) Axillary 85 18 95 % --   03/13/24 0300 -- -- -- 92 26 95 % --   03/13/24 0200 -- -- -- 94 23 96 % --   03/13/24 0150 -- -- -- 95 21 95 % --   03/13/24 0149 -- -- -- 88 25 90 % --   03/13/24 0100 -- -- -- 84 20 (!) 88 % --   03/13/24 0000 (!) 75/39 97.9  F (36.6  C) Axillary 93 26 95 % --   03/12/24 2300 -- -- -- 87 19 95 % --   03/12/24 2255 102/61 -- -- 84 26 -- --   03/12/24 2230 (!) 79/51 -- -- 84 22 97 % --   03/12/24 2215 (!) 75/56 -- -- 90 20 98 % --   03/12/24 2200 (!) 80/57 -- -- 91 16 96 % --   03/12/24 2145 (!) 89/63 -- -- 98 17 (!) 86 % --   03/12/24 2100 -- -- -- 97 23 90 % --   03/12/24 2044 -- -- -- -- -- 94 % --   03/12/24 2000 -- 97.7  F (36.5  C) Axillary 93 16 (!) 89 % --   03/12/24 1900 108/71 -- -- 83 21 -- --   03/12/24 1700 -- -- -- 91 28 (!) 84 % --   03/12/24 1600 -- 98  F (36.7  C) Oral 90 21 (!) 89 % --    03/12/24 1501 -- -- -- -- 20 -- --   03/12/24 1449 -- -- -- -- 20 -- --   03/12/24 1430 -- -- -- -- 18 -- --       Physical Examination:  GENERAL:  well-developed, well-nourished, in bed in no acute distress.   HEENT:  Head is normocephalic, atraumatic   EYES:  Eyes have anicteric sclerae without conjunctival injection or stigmata of endocarditis.    ENT:  Oropharynx is moist without exudates or ulcers. Tongue is midline  NECK:  Supple. No cervical lymphadenopathy  LUNGS:  Clear to auscultation bilateral.   CARDIOVASCULAR:  Regular rate and rhythm with no murmurs, gallops or rubs.  ABDOMEN:  Normal bowel sounds, soft, nontender. No appreciable hepatosplenomegaly  SKIN:  No acute rashes.  Line(s) are in place without any surrounding erythema or exudate. No stigmata of endocarditis.  NEUROLOGIC:  Grossly nonfocal. Active x4 extremities         Laboratory Data:     Inflammatory Markers    Recent Labs   Lab Test 03/11/24  1028 02/15/22  0941   SED 56*  --    CRP  --  <2.9       Hematology Studies    Recent Labs   Lab Test 03/12/24  1449 03/12/24  0542 03/11/24  0617 03/10/24  0623 03/08/24  1334 06/01/23  0539 05/25/23  0444   WBC  --  14.3* 15.1* 10.7 8.3 12.6* 10.9   HGB 11.0* 10.6* 11.8* 12.9* 13.2* 11.7* 12.2*   MCV  --  100 100 101* 100 100 99   PLT  --  163  163 152 152 176 140* 134*       Metabolic Studies     Recent Labs   Lab Test 03/13/24  0644 03/12/24  1235 03/11/24  1620 03/11/24  0617 03/10/24  0623 03/09/24  2315    135 139 139  138  --  140   POTASSIUM 3.8  3.8 3.3* 3.4 3.6  3.4 3.8 3.3*   CHLORIDE 97* 94* 95* 95*  95*  --  94*   CO2 24 25 28 21*  27  --  31*   BUN 75.3* 77.0* 71.1* 64.7*  68.5*  --  63.5*   CR 2.04* 2.14* 2.24* 2.39*  2.36*  --  2.48*   GFRESTIMATED 33* 31* 29* 27*  27*  --  26*       Hepatic Studies    Recent Labs   Lab Test 03/13/24  0644 03/11/24  0617 03/09/24  2315 05/20/23  0427 05/19/23  0446 05/18/23  0855 02/15/22  0941   BILITOTAL 1.2 1.8* 1.7*  --  1.0 1.0  "0.5   ALKPHOS 61 66 77  --  56 69 56   ALBUMIN 3.4* 3.9 4.3 3.3* 3.3* 3.7 3.3*   AST 30 32 33  --  24 24 23   ALT 17 13 13  --  15 15 24       Microbiology:  All cultures:  Recent Labs   Lab 03/11/24  1553 03/11/24  1547 03/11/24  1100   CULTURE No growth after 1 day No growth after 1 day 3+ Staphylococcus aureus*  2+ Normal godwin        Respiratory Virus Testing    No results found for: \"RS\", \"FLUAG\"       CT CHEST (3/9):  IMPRESSION:   1.  Segments of cylindrical bronchiectasis with endobronchial mucosal thickening and secretions in the right middle lobe, lingula and both lower lobes as well as patchy airspace opacity in the adjacent lung parenchyma consistent with bronchopneumonia.   2.  Hyperinflation of the lungs and moderate mid and upper lung centrilobular emphysema.   3.  Moderate cardiomegaly and severe three-vessel coronary artery calcification.  4.  Enlargement central pulmonary arteries (MPA diameter 4.6 cm) consistent with chronic pulmonary arterial hypertension.      NM LUNG SCAN PERFUSION PARTICULATE (3/11)  Impression: Low probability of pulmonary embolism based on this perfusion only study.    RHC (3/12):  PCWP: 6 mmHg       PA: 57/20/35 mmHg (s/d/m)      RV: 57/5 mmHg (s/ed)      RA: --/17/11 mmHg      CO 4.15 L/min (thermodilution); 4.51 L/min (Shikha)      CI: 1.83 L/min/m2 (TD); 1.98 L/min/m2 (Shikha)  PVR: 6.9 (TD) Wood Units   Right sided filling pressures are mildly elevated. Left sided filling pressures are normal. Moderately elevated pulmonary artery hypertension. Reduced cardiac output level. Hemodynamic data has been modified in Epic per physician review.     FELLOW ATTESTATION  I was present with the medical student, who participated in the service and in the documentation of the note.  I have verified the history and personally performed the physical exam and medical decision making.  I agree with the assessment and plan of care as documented in the note. Staffed with Dr. Perez.         "

## 2024-03-13 NOTE — CONSULTS
Care Management Initial Consult    General Information  Assessment completed with: Patient, Spouse or significant other,    Type of CM/SW Visit: Initial Assessment    Primary Care Provider verified and updated as needed: Yes   Readmission within the last 30 days: no previous admission in last 30 days      Reason for Consult: discharge planning  Advance Care Planning: Advance Care Planning Reviewed: no concerns identified          Communication Assessment  Patient's communication style: spoken language (English or Bilingual)    Hearing Difficulty or Deaf: yes   Wear Glasses or Blind: yes    Cognitive  Cognitive/Neuro/Behavioral: WDL  Level of Consciousness: alert  Arousal Level: opens eyes spontaneously  Orientation: oriented x 4  Mood/Behavior: calm, cooperative  Best Language: 0 - No aphasia  Speech: clear, spontaneous, logical    Living Environment:   People in home: spouse     Current living Arrangements: house      Able to return to prior arrangements: yes       Family/Social Support:  Care provided by: self, spouse/significant other  Provides care for: no one, unable/limited ability to care for self  Marital Status:   Wife  Neela Sinclair       Description of Support System: Supportive, Involved    Support Assessment: Adequate family and caregiver support, Adequate social supports    Current Resources:   Patient receiving home care services: No     Community Resources: None  Equipment currently used at home: walker, rolling  Supplies currently used at home: Oxygen Tubing/Supplies    Employment/Financial:  Employment Status: retired        Financial Concerns: none   Referral to Financial Worker: No       Does the patient's insurance plan have a 3 day qualifying hospital stay waiver?  No    Lifestyle & Psychosocial Needs:  Social Determinants of Health     Food Insecurity: Not on file   Depression: Not at risk (2/15/2022)    PHQ-2     PHQ-2 Score: 2   Housing Stability: Not on file   Tobacco Use: Medium  Risk (3/1/2024)    Patient History     Smoking Tobacco Use: Former     Smokeless Tobacco Use: Never     Passive Exposure: Not on file   Financial Resource Strain: Not on file   Alcohol Use: Not on file   Transportation Needs: Not on file   Physical Activity: Not on file   Interpersonal Safety: Not on file   Stress: Not on file   Social Connections: Not on file       Functional Status:  Prior to admission patient needed assistance:   Dependent ADLs:: Ambulation-walker  Dependent IADLs:: Independent, Transportation  Assesssment of Functional Status: Needs assistance with transportation, At functional baseline    Mental Health Status:  Mental Health Status: No Current Concerns       Chemical Dependency Status:  Chemical Dependency Status: No Current Concerns             Values/Beliefs:  Spiritual, Cultural Beliefs, [facility] Practices, Values that affect care: no               Additional Information:  SW met with patient and his wife, Neela, at his bedside. SW read through patient's chart prior to meeting with them, and read that patient has a history of home care with [facility]. Through [facility], patient received skilled nursing, PT, and OT. Neela reported patient progressed out of needing home care and they no longer receive their services. Both Neela and patient reported they believe he will need some rehab, however patient does not want to go to a TCU. He feels he would be okay to go home with outpatient rehab. Neela agreed with patient. She reported he can do the rehab exercises at home and said patient did well when he was last discharged with the at home exercises. Neela reported patient currently is independent with most of the daily living activities. Neela helps with transportation to appointments. SW offered transportation resources such as medicare transportation or metro mobility. They denied needing any.     Neela and patient asked if they could look into changing his oxygen from portable to oxygen  tanks. They reported they feel the tanks would be convenient for travel. SW passed this information to the RNCC who will meet with them for more information.     SW will continue to follow for support and discharge planning needs.      Lottie Pollard [facility]  ICU   Phone: [phone]  Pager: [phone]

## 2024-03-14 NOTE — PROGRESS NOTES
MEDICAL ICU PROGRESS NOTE  03/14/2024      Date of Service (when I saw the patient): 03/14/2024    ASSESSMENT:   Buck Sinclair is a 79 year old male with a medical history significant for chronic respiratory failure on 6-8L NC, PHTN, CKD IIIA, CAD s/p CANDELARIA x3 to the yacocqrj-tr-dexllb LAD (9/2017), longstanding persistent AF s/p multiple electrical cardioversions and extensive catheter ablations x2 in 2011 (on coumadin), ischemic cardiomyopathy s/p Medtronic dual-chamber PPM in 1999 replaced with a Medtronic dual-chamber implantable cardiac defibrillator on 6/11/2014, MDD, HLD  who was admitted to the [facility] on 3/9/2024 for acute on chronic respiratory failure and acute kidney injury. His hospital stay was complicated by worsening respiratory status requiring transfer to the ICU.       CHANGES and MAJOR THINGS TODAY:   - Continue diuresis with Bumex 1mg BID > switched to oral   - HFNC with intermittent BiPAP at rest   - Repeat ABG tomorrow morning   - Repeat ABG on HFNC   - Methylprednisolone 100 mg IV   - Continue Levaquin (7 day course)  - Pulmonary continues to follow     PLAN:    Neuro:   #Depression  - Continue PTA prozac     CV:  #Ischemic cardiomyopathy with recovered LV function and mildly reduced RV function  # Longstanding persistent AF s/p multiple electrical cardioversions and extensive catheter ablations x2 in 2011 (on coumadin)  #Essential hypertension  #CAD - LAD PCI/CANDELARIA 9/20/2017  ECHO 5/23: EF 55-60% but severe pulmonary HTN. ECHO 3/24: EF 55-60% no significant changes, severe RV dilation with moderately reduced function. Right Heart Cath 3/24: Moderately elevated pulmonary hypertension with mildly elevated right sided filling pressures.  - MAP > 65; HDS has remained off pressors  - Watch fluid status as patient may be preload dependent given RVF  - Continue aspirin, statin   - Continue pta metoprolol succiante ER 25 mg daily (hold if SBP <110, or P <60)  "    Pulm:  #COPD GOLD E; brief history of tobacco use and inhalational exposure working as a .   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  #Acute on chronic hypoxic and hypercarbic respiratory failure  # Combined Pulmonary Fibrosis and Emphysema   - Pulmonary following:   Reasons for current exacerbations could associated with infection, volume overload, and worsening pulmonary hypertension, and/or COPD exacerbation. On current appropriate abx regimen to cover for infection. ABG/VBGs have been stable and tolerates HFNC, suggest intermittent BiPAP at rest and night to assist with additional pressure and appears patient needs this at the moment with improvement in PaO2 on ABG compared to HFNC. Per PFTs in 2022 he also has overall decreased FVC and also decreased TLC, suggestive of restrictive pattern with possibly autoimmune etiology? See Pulmonary note for detail.   - VQ Scan 3/11/24 Negative for PE  - CT 3/9/24 with \"Segments of cylindrical bronchiectasis with endobronchial mucosal thickening as well as patchy airspace opacity in the adjacent lung parenchyma consistent with bronchopneumonia\".  - Continue PTA Spiriva, Symbicort, albuterol  - Supplemental oxygen to keep SpO2 88-92%  - HFNC, with intermittent BiPAP when resting   - Methylprednisolone  mg one time pulse therapy   - Prednisone taper   - Diuresis with Bumex 1 mg PO BID        GI/Nutrition:  No current concerns  - Speech Consulted, appreciate recs  - Okay for Diet, difficulties mostly with swallowing pills in the AM, will take with apple sauce   > Minced and moist with thin liquids   - Request Video swallow evaluation     Renal:  #CHRISSY on CKD3a   # Metabolic Acidosis > improved   #Hypokalemia  Baseline Cr 1.63 11/23 and slow climb since 3/4 from 2/27 to 3.03 with increase in diuresis.  ~ Avoid nephrotoxic meds  ~ Monitor I&O and Daily weights  ~ Resume Bumex, 1 mg BID, halfing dose give possible over-diuresis " and contraction alkalosis   ~ Daily CMP   ~ RN Managed Electrolyte replacement protocols     ID:  #Community Acquired Pneumonia  #Leukocytosis  Admitted with normal WBC count, peaked at 15.1 on HOD3, now trending down.  - Respiratory Viral Panel is Negative  - MRSA Nares with positivity of SA DNA  - Respiratory culture from 3/11 +3 Staph aureus     Plan:  ~ Current Antibiotics:  Vancomycin (3/11 - 3/13)  Ceftriaxone (3/11 - 3/13)  Doxycycline (3/12 - 3/13)  Levoquin (3/13 - )  ~ Levoquin, likely 5 days to complete a total of 7 day treatment  ~ Imaging most likely suggestive of atypical pneumonia      Heme:  #Chronic Coagulopathy  - Patient on coumadin for afib  ~ Transfuse with Hgb goal >7  ~ Monitor for S/sx of bleeding  ~ Pharmacy to Dose coumadin  INR goal 2-3  ~ Daily INR + CBC  ~ US Lower Extremity Venous Duplex, bilateral to evaluate for DVT; negative     Endo:  #DM2  ~ Keep BG <180  ~ Hypoglycemia protocols  ~ Currently on empagliflozin     MSK/Rheum:  No current concerns  - Rheumatology consulted to evaluate for scleroderma vs autoimmune disease.  - DONNA and other Rheum labs negative, not likely to be scleroderma      General Cares/Prophylaxis:    DVT Prophylaxis: Warfarin and Pneumatic Compression Devices  GI Prophylaxis: Not indicated  Restraints: Not Indicated  Family Communication: Family updated   Code Status: No CPR- Do NOT Intubate    Lines/tubes/drains:  ~ PIV x 1    Disposition:  ~ Medical ICU    Patient seen and findings/plan discussed with medical ICU staff, Dr. Styles.      Total patient care time includes 40  minutes.   ====================================  Interval: NAD. Remains off drips. Continues HFNC with intermittent BiPAP, oxygen requirements decreasing today. Pulmonary following. Patient aware of plan and family updated at bedside.        OBJECTIVE:   1. VITAL SIGNS:   Temp:  [97.4  F (36.3  C)-98.5  F (36.9  C)] 98.5  F (36.9  C)  Pulse:  [77-97] 82  Resp:  [14-30] 19  BP:  ()/(56-74) 83/74  FiO2 (%):  [60 %-100 %] 90 %  SpO2:  [83 %-98 %] 90 %  FiO2 (%): 90 %  Resp: 19    2. INTAKE/ OUTPUT:   I/O last 3 completed shifts:  In: 565 [P.O.:500; I.V.:65]  Out: 2325 [Urine:2325]    3. Physical Exam  Vitals and nursing note reviewed.   Constitutional:       General: He is sleeping.      Appearance: He is obese.      Interventions: Face mask in place.   HENT:      Head: Normocephalic.      Mouth/Throat:      Mouth: Mucous membranes are dry.      Pharynx: Oropharynx is clear.   Eyes:      Extraocular Movements: Extraocular movements intact.      Conjunctiva/sclera: Conjunctivae normal.      Pupils: Pupils are equal, round, and reactive to light.   Cardiovascular:      Rate and Rhythm: Normal rate. Rhythm irregular.   Pulmonary:      Breath sounds: Examination of the right-upper field reveals rhonchi. Examination of the left-upper field reveals rhonchi. Examination of the right-middle field reveals rhonchi. Decreased breath sounds and rhonchi present.   Abdominal:      General: There is no distension.      Palpations: Abdomen is soft.      Tenderness: There is no abdominal tenderness.   Musculoskeletal:         General: Swelling present. Normal range of motion.      Cervical back: Normal range of motion.      Right lower leg: Edema present.   Neurological:      General: No focal deficit present.      Mental Status: He is oriented to person, place, and time and easily aroused.       4. LABS:   Arterial Blood Gases   Recent Labs   Lab 03/13/24  1218 03/10/24  0604 03/09/24  0914   PH 7.44 7.51* 7.51*   PCO2 39 38 42   PO2 197* 91 62*   HCO3 27 31* 33*     Complete Blood Count   Recent Labs   Lab 03/14/24  0622 03/12/24  1449 03/12/24  0542 03/11/24  0617 03/10/24  0623   WBC 11.9*  --  14.3* 15.1* 10.7   HGB 10.5* 11.0* 10.6* 11.8* 12.9*     --  163  163 152 152     Basic Metabolic Panel  Recent Labs   Lab 03/14/24  0622 03/13/24  0644 03/12/24  1235 03/12/24  1011 03/11/24  1624     135 135  --  139   POTASSIUM 3.5 3.8  3.8 3.3*  --  3.4   CHLORIDE 99 97* 94*  --  95*   CO2 27 24 25  --  28   BUN 74.7* 75.3* 77.0*  --  71.1*   CR 2.06* 2.04* 2.14*  --  2.24*   * 178* 170* 168* 170*     Liver Function Tests  Recent Labs   Lab 03/14/24  0622 03/13/24  0644 03/12/24  1942 03/12/24  0542 03/11/24  0617 03/10/24  0623 03/09/24  2315   AST  --  30  --   --  32  --  33   ALT  --  17  --   --  13  --  13   ALKPHOS  --  61  --   --  66  --  77   BILITOTAL  --  1.2  --   --  1.8*  --  1.7*   ALBUMIN  --  3.4*  --   --  3.9  --  4.3   INR 1.91* 1.79* 1.82* 2.10* 3.25*   < > 3.08*    < > = values in this interval not displayed.     Coagulation Profile  Recent Labs   Lab 03/14/24  0622 03/13/24  0644 03/12/24  1942 03/12/24  0542 03/11/24  0617 03/10/24  0623 03/09/24  2315   INR 1.91* 1.79* 1.82* 2.10*   < > 3.05* 3.08*   PTT  --   --   --   --   --  38 40*    < > = values in this interval not displayed.       5. RADIOLOGY:   Recent Results (from the past 24 hour(s))   US Lower Extremity Venous Duplex Bilateral    Narrative    ULTRASOUND LOWER EXTREMITY VENOUS DUPLEX BILATERAL 3/13/2024 9:44 AM    CLINICAL HISTORY: Lower extremity edema      COMPARISONS: None available.    REFERRING PROVIDER: NIRU CURIEL    TECHNIQUE: Grayscale, color Doppler, Doppler waveform ultrasound  evaluation was performed through the bilateral common femoral,  femoral, and popliteal veins. Bilateral posterior tibial and peroneal  veins were evaluated with grayscale imaging and compression.    FINDINGS: Right common femoral, femoral, and popliteal veins are fully  compressible, patent, and demonstrate normal phasic Doppler waveforms.    Right posterior tibial veins are fully compressible to the ankle.    Right peroneal veins are fully compressible to the distal calf.    Left common femoral, femoral, and popliteal veins are fully  compressible, patent, and demonstrate normal phasic Doppler waveforms.    Left  "posterior tibial veins are fully compressible to the ankle.    Left peroneal veins are fully compressible to the distal calf.      Impression    IMPRESSION: No deep venous thrombosis demonstrated in either leg.    Reference: \"Duplex Ultrasound in the Diagnosis of Lower-Extremity Deep  Venous Thrombosis\"- Ann Mccloud MD, S; Adrián Gregorio MD  (Circulation. 2014;129:917-921. http://circ.ahajournals.org)    VISHAL ANTHONY MD         SYSTEM ID:  [id number]       GIBRAN Qiu CNP    Physician Attestation   Patient was independently examined and evaluated. The case was discussed with ISMA Elena and a mutual management plan was agreed upon which is reflected in the above note. 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 He had evidence of acute right heart decompensation, a transthoracic echocardiogram confirmed RV dilation. A right heart cath confirmed elevated RV pressures 57/5 mmHg, PAP 57/20 (35) mmHg. He was being aggressively diuresed and had contraction alkalosis, resulting in compensatory CO2 retention. I reviewed the CT chest images from 03/09/2024 and compared with previous studies. Findings are consistent with upper zones dominant extensive emphysema, basal reticular changes with some traction bronchiectasis suggestive of CPFE. An overlapping ILD e.g. airway centric fibrosis, NSIP is also possible. He does have B/L R>L nodular opacities most suggestive of infectious etiology.     He continues to have " significant A-a gradiant particularly at lower airway pressures even with High Flow O2 as compared to NIPPV. There is no evidence of shunt, as suggested by a negative bubble echo study.      I have reviewed changes in critical data from the last 24 hours, including medications, laboratory results, vital signs, active consults and radiograph results.      - Agree with a trial of high (1mg/kg) dose of systemic steroids, considering the GGO on recent CT chest.  - Complete a 7 days course of Levofloxacin. Pharmacy to dose adjust warfarin.  - Alright to resume enteric feeding now.  - Continue IV diuresis with Bumex 1 mg with close monitoring of renal function.  - Use intermittent BIPAP S (12/8 cm H2O) during daytime and all time during sleep.    I formulated and discussed the care plan with the patient s Advanced Practice Provider GIBRAN Qiu CNP and MICU team. I agree with the findings and plan of care as documented in this note.    I personally spent 40 minutes of critical care time reviewing labs and imaging, examining the patient, managing the ventilator. This does not include time spent on procedures or teaching and independent of OZ critical care time.       Jensen Becerra MD  Pulmonary and Critical Care Medicine  Date of Service (when I saw the patient): 03/14/24

## 2024-03-14 NOTE — PLAN OF CARE
Major Shift Events: Placed on contact isolation for CP- or Candida auris rule out.      Neuro: A&Ox4. [place] in both ears, use of hearing aids. Denied pain. Call light appropriate. PERRL. Up with assist of 1.  CV: Afib 70-90s. Afebrile. MAP goal >65.   Resp: HFNC 90% 40LPM. Infrequent cough, minimal tan/pink/jose martin sputum.  GI: Regular diet. No BM this shift.  : WDL  Skin: Scabs on shins and generalized bruising on arms.  IV: R PIV        Plan: Continue with Abx, steroids, and O2 support.     For complete assessment and vital data see flowsheets.    Goal Outcome Evaluation:      Plan of Care Reviewed With: patient    Overall Patient Progress: improvingOverall Patient Progress: improving    Outcome Evaluation: SpO2 94% overnight while on HFNC 90% 40LPM compared to BiPaP 70%FiO2 previous night.

## 2024-03-14 NOTE — PROGRESS NOTES
03/14/24 0900   Appointment Info   Signing Clinician's Name / Credentials (SLP) SMILEY Sanchez student   Student Supervision Direct supervision provided   General Information   Onset of Illness/Injury or Date of Surgery 03/12/24   Referring Physician Mariam Styles MD   Patient/Family Therapy Goal Statement (SLP) None stated   Pertinent History of Current Problem Mr. Buck Sinclair is a 79 y.o. male patient with PMHx of chronic respiratory failure on 6-8L NC home O2 at baseline, Pulmonary HTN (last RHC in 2022 with mPAP= 37mmHg, repeat RHC this admission), CKD IIIA, CAD s/p CANDELARIA x3 to the plcjcmxz-hu-hmcgoo LAD (9/2017), longstanding persistent AF s/p multiple electrical cardioversions and extensive catheter ablations x2 in 2011 (on coumadin), ischemic cardiomyopathy s/p Medtronic dual-chamber PPM in 1999 replaced with a Medtronic dual-chamber implantable cardiac defibrillator on 6/11/2014, MDD, HLD. He was admitted on 3/8/2024 with acute on chronic RS failure and acute renal failure with concerns for possible over diuresis and was transferred to [facility] for a RHC. He has been on home oxygen since 4 years, initially on 2-4L NC, but slowly progressed to a baseline of 6-8L NC. Currently he is requiring HFNC with 100% FiO2 at 50LPM. Video swallow study completed per MD orders.   Type of Evaluation   Type of Evaluation Swallow Evaluation   General Swallowing Observations   Past History of Dysphagia Pt seen at the bedside yesterday with no overt s/sx of aspiration across PO trials.   Respiratory Support high-flow;nasal cannula  (100% FiO2 with 50LPM via HFNC)   Current Diet/Method of Nutritional Intake (General Swallowing Observations, NIS) regular diet   Swallowing Evaluation Videofluoroscopic swallow study (VFSS)   Clinical Swallow Evaluation   Feeding Assistance minimal assistance required   VFSS Evaluation   Radiologist [facility] resident   Views Taken left lateral   VFSS Textures Trialed thin liquids;mildly thick  liquids;pureed;solid foods   VFSS Eval: Thin Liquid Texture Trial   Mode of Presentation, Thin Liquid cup;self-fed   Order of Presentation 1, 2, 3, 4, 5, 6, 12, 14, 15   Preparatory Phase WFL   Oral Phase, Thin Liquid WFL   Bolus Location When Swallow Triggered posterior laryngeal surface of epiglottis   Pharyngeal Phase, Thin Liquid impaired tongue base retraction;residue in vallecula;residue in pyriform sinus;impaired epiglottic movement;impaired pharyngoesophageal segment opening   Rosenbek's Penetration Aspiration Scale: Thin Liquid Trial Results 8 - contrast passes glottis, visible subglottic residue remains, absent patient response (aspiration)   Response to Aspiration absent response   Strategies and Compensations chin tuck;double swallow   Diagnostic Statement Thin liquids via cup resulted in deep penetration with eventual aspiration after the swallow follwed by a delayed cough. Cough ineffective at clearing aspirated residual from trachea. Chin tuck strategy resulted in consistent penetration; however, no aspiration with mild residuals remaining in laryngeal vestubule   VFSS Eval: Mildly Thick Liquids   Mode of Presentation cup;self-fed   Order of Presentation 7, 8, 9, 16   Preparatory Phase WFL   Oral Phase WFL   Bolus Location When Swallow Triggered valleculae   Pharyngeal Phase impaired tongue base retraction;impaired epiglottic movement;residue in pyriform sinus;residue in vallecula;impaired pharyngoesophageal segment opening   Rosenbek's Penetration Aspiration Scale 3 - contrast remains above the vocal cords, visible residue remains (penetration)   Strategies and Compensations chin tuck   Diagnostic Statement Mildly thick liquids via cup resulted penetration to vocal cords; however, no aspiration noted. Increased pharyngeal residue noted with mildly thick liquids. Chin tuck with double swallow strategy was effective in reducing depth and volume of penetration   VFSS Evaluation: Puree Solid Texture  Trial   Mode of Presentation, Puree spoon;self-fed   Order of Presentation 10, 11   Preparatory Phase WFL   Oral Phase, Puree WFL   Bolus Location When Swallow Triggered valleculae   Pharyngeal Phase, Puree impaired tongue base retraction;residue in pyriform sinus;residue in vallecula;impaired epiglottic movement   Rosenbek's Penetration Aspiration Scale: Puree Food Trial Results 3 - contrast remains above the vocal cords, visible residue remains (penetration)   Strategies and Compensations chin tuck;double swallow   Diagnostic Statement Pureed textures resulted in shallow penetration; however, no aspiration noted. Use of chin tuck strategy and double dry swallow was partially effective at reducing depth of penetration and decreasing pharyngeal residuals.   VFSS Evaluation: Solid Food Texture Trial   Mode of Presentation, Solid self-fed   Order of Presentation 13.   Preparatory Phase prolonged bolus preparation   Oral Phase, Solid impaired AP movement   Bolus Location When Swallow Triggered posterior laryngeal surface of epiglottis   Pharyngeal Phase, Solid impaired epiglottic movement;residue in pyriform sinus;impaired pharyngoesophageal segment opening;residue in vallecula;impaired tongue base retraction   Rosenbek's Penetration Aspiration Scale: Solid Food Trial Results 3 - contrast remains above the vocal cords, visible residue remains (penetration)   Strategies and Compensations chin tuck   Diagnostic Statement Regular solid textures resulted in shallow penetration; however, no aspiration noted. Use of chin tuck strategy and double dry swallow was effective in reducing depth depth of penetration and decreasing pharyngeal residuals. However, moderate pharyngeal residuals remained despite strategy use.   Esophageal Phase of Swallow   Patient reports or presents with symptoms of esophageal dysphagia Yes   Esophageal comments Pt hx of GERD/reflux   Swallowing Recommendations   Diet Consistency Recommendations thin  liquids (level 0);minced & moist (level 5)   Supervision Level for Intake 1:1 supervision needed   Mode of Delivery Recommendations bolus size, small;slow rate of intake   Postural Recommendations chin tuck   Swallowing Maneuver Recommendations double dry swallow   Monitoring/Assistance Required (Eating/Swallowing) stop eating activities when fatigue is present;monitor for cough or change in vocal quality with intake   Recommended Feeding/Eating Techniques (Swallow Eval) minimize distractions during oral intake;maintain upright sitting position for eating;maintain upright posture during/after eating for 30 minutes   Medication Administration Recommendations, Swallowing (SLP) Meds OK crushed in puree   Instrumental Assessment Recommendations VFSS (videofluoroscopic swallowing study)  (recommend repeat VFSS in ~2 weeks)   General Therapy Interventions   Planned Therapy Interventions Dysphagia Treatment   Dysphagia treatment Instruction of safe swallow strategies;Modified diet education;Compensatory strategies for swallowing;Oropharyngeal exercise training   Clinical Impression   Criteria for Skilled Therapeutic Interventions Met (SLP Eval) Yes, treatment indicated   SLP Diagnosis Moderate acute on chronic oropharyngeal dysphagia   Risks & Benefits of therapy have been explained evaluation/treatment results reviewed;care plan/treatment goals reviewed;risks/benefits reviewed;current/potential barriers reviewed;participants voiced agreement with care plan;participants included;patient;spouse/significant other   Clinical Impression Comments Video swallow study completed per MD orders. Pt presents with moderate acute on chronic oropharyngeal dysphagia in the setting of COPD under fluoroscopy. Pt's swallow function assessed while on HFNC (100% FiO2 with 50LPM) Pt's swallow function characterized by prolonged time in oral phase, reduced  BOT retraction, delayed pharyngeal swallow respones, no epiglottic inversion, adequate  hyolaryngeal excursion, reduced laryngeal vestibule closure, and diminished pharyngeal stripping wave. Thin liquids via cup resulted in deep penetration with eventual aspiration after the swallow follwed by a delayed cough which was ineffective at clearing aspirated residual from trachea. Use of chin tuck strategy resulted in consistent penetration; however, no aspiration with mild residuals remaining in laryngeal vestubule. Mildly thick liquids, pureed, and regular solid textures resulted in shallow penetration; however, no aspiration noted. Use of chin tuck strategy and double dry swallow was effective in reducing depth and volume of penetration. Moderate pharyngeal residue remained in valleculae and pyriforms across PO trials. Extra dry swallow and liquid wash somewhat effective at clearing pharyngeal residuals. Pt is at risk for aspiration during and after the swallow unless utilizing strict compensatory swallow strategies. Recommend pt initiate mined and moist textures (5) and thin liquids (0) with use of chin tuck and double dry swallow strategy with each sip/bite. Pt should be upright and alert for all PO, take small bites/sips, alternate between consistencies, and slow pacing. ST to continue to follow to assess diet tolerance, advancement, train compensatory strategies, and oropharyngeal exercises. Pt would benefit from repeat VFSS in 2 weeks to reassess oropharyngeal swallow function. Anticipate pt will require close ST follow-up at discharge.   SLP Discharge Recommendation home with outpatient therapy services   SLP Rationale for DC Rec pt with acute on chronic oropharyngeal dysphagia and would benefit from repeat VFSS in ~ 2 weeks   SLP Brief overview of current status  Recommend pt initiate mined and moist textures (5) and thin liquids (0) with use of chin tuck and double dry swallow strategy with each sip/bite. Pt should be upright and alert for all PO, take small bites/sips, alternate between  consistencies, and slow pacing.   Total Session Time   Total Session Time (sum of timed and untimed services) 30

## 2024-03-14 NOTE — PLAN OF CARE
ICU End of Shift Summary. See flowsheets for vital signs and detailed assessment.    Changes this shift: VSS. Afebrile. A/Ox4. Swallow study with barium done this AM. Speech recommended minced/moist with thin liquids for patient, MD updated diet order. No BM this shift. Blood gas arterial with HFNC oxygenation. Good UO. No acute events this shift, no new skin concerns.     Plan: Continue plan of care, adjust as needed.      Goal Outcome Evaluation:      Plan of Care Reviewed With: patient    Overall Patient Progress: improvingOverall Patient Progress: improving    Outcome Evaluation: see note

## 2024-03-14 NOTE — PROGRESS NOTES
PULMONARY CONSULTATION: Progress Note     Patient:  Buck Sinclair   Date of birth 1945, Medical record number [id number]  Date of Visit:  03/14/2024  Date of Admission: 3/9/2024  Consult Requester: Velia Cabrera MD  Reason for Consult: To assess COPD severity          Assessment and Recommendations:     ASSESSMENT:  Mr. Buck Sinclair is a 79 y.o. male patient with PMHx of chronic respiratory failure on 6-8L NC home O2 at baseline, Pulmonary HTN (last RHC in 2022 with mPAP= 37mmHg, repeat RHC this admission), CKD IIIA, CAD s/p CANDELARIA x3 to the yytxhwpl-yf-vdvevo LAD (9/2017), longstanding persistent AF s/p multiple electrical cardioversions and extensive catheter ablations x2 in 2011 (on coumadin), ischemic cardiomyopathy s/p Medtronic dual-chamber PPM in 1999 replaced with a Medtronic dual-chamber implantable cardiac defibrillator on 6/11/2014, MDD, HLD. He was admitted on 3/8/2024 with acute on chronic RS failure and acute renal failure with concerns for possible over diuresis and was transferred to [facility] for a RHC. He has been on home oxygen since 4 years, initially on 2-4L NC, but slowly progressed to a baseline of 6-8L NC. Currently his requirement is % FiO2 at 45LPM via HFNC. Currently, the concern if for Interstitial Pneumonia with Autoimmune Features (IPAF).    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 Per  his PFTs dated 10/7/22, FEV1/FVC is 61%, significant for obstructive pathology. CT (3/9) shows hyperinflation of the lungs and moderate mid and upper lung centrilobular emphysema, with NSIP pattern as well. Overall his COPD seems to have remained the same and is not progressing. The reasons for his current exacerbation may be PE, infections, volume overload, and worsening PHTN. Pulmonary embolism has been ruled out by NM VQ scan. PHTN per RHC on 3/12/2024 (mPAP=35) is stable as compared to that on 1/24/2022 (mPAP=37). He was also euvolemic on exam, and the concern previously was for overdiuresis and not volume overload. We sent a complete viral panel and sputum cultures on him. Viral panel negative. However, sputum growing 3+ Staph aureus, sensitivities awaited. Most likely commensal. MRSA swab negative. However, CRP and ESR are elevated, pointing more towards infectious vs inflammatory/autoimmune etiology for his current exacerbation.    Per the PFTs from 2022 he also has overall decreased FVC and also a decreased TLC, suggestive of restrictive pattern. This makes us think in terms of ILD and other restrictive pathologies. Very likely that this is autoimmune in nature. He has 4 features suggestive of an autoimmune component:  Pulmonary HTN: Possible that he has underlying systemic sclerosis leading to Group 1 PHTN, in addition to the COPD related Group 3 PHTN. PHTN is out of proportion to his COPD symptoms.  Esophageal dysmotility: His CT does show esophageal dilatation and history is also consistent with swallowing difficulty, with food feeling stuck in his throat at times.   Sicca symptoms: Dry mouth present, however no dry eyes per the patient  ILD: NSIP pattern on CT chest, with restrictive pattern on PFTs    No known history of autoimmune disease in him or his family. No obvious skin changes per our examination. No joint pains. Keeping systemic sclerosis under high suspicion, we sent a full ILD panel on him,  but came back negative for ANAs, SS-A and B,  Edita-1, Scl-70, RA, and CCP. HSP panel awaited. Overall this patient's condition can be classified as Interstitial Pneumonia with Autoimmune Features (IPAF).    Appreciate rheumatology recs. Anticentromere and RNP antibodies are pending.    RECOMMENDATION:  Start methylprednisone pulse therapy, observe for improvement in oxygen requirements  CXR today    Thank you for this consult.    Patient was discussed with Dr. Talita Perez.     Sai BRISCOE  Medical Student (sub-intern)    Attestation:   I agree with the examination findings, assessment and plan.   Patient is presenting with IPAF (pulmonary HTN, NSIP ILD, dysphagia, sicca symptoms), and will treat with steroids for the meantime, with consideration of MMF therapy in the near future. Case discussed with rheumatology Dr. Pena and another consideration discusses is lip biopsy as an outpatient for seronegative Sjogren. Also sending RNP antibody.   Talita Perez MD   ________________________________________________________________    Consult Question: To assess COPD severity.  Admission Diagnosis: Pulmonary hypertension (H) [I27.20]         Interval History:   No overnight events. Patient feels better. No worsening dyspnea. No fevers or chills. Clarified with patient and his wife, about our high suspicion for autoimmune disease despite the negative test results, and the treatment plan with high dose steroids and prolonged taper. Talked about clinic follow-up with Dr. Perez.         History of Present Illness:     Mr. Buck Sinclair is a 79 y.o. male patient with PMHx of chronic respiratory failure on 6-8L NC home O2 at baseline, Pulmonary HTN (last RHC in 2022 with mPAP= 37mmHg, repeat RHC this admission), CKD IIIA, CAD s/p CANDELARIA x3 to the [id number]-bf-cdxlih LAD (9/2017), longstanding persistent AF s/p multiple electrical cardioversions and extensive catheter ablations x2 in 2011 (on coumadin), ischemic  cardiomyopathy s/p Medtronic dual-chamber PPM in 1999 replaced with a Medtronic  dual-chamber implantable cardiac defibrillator on 6/11/2014, MDD, HLD who was admitted on 3/8/2024 with acute on chronic RS failure and acute renal failure with concerns for possible over diuresis who was transferred for a RHC. Patient notes he has been having increased SOB with increased aggressive outpatient diuresis, he had a worsening creatinine causing him to present to the ED. He received 500cc of IV fluids in the ED. His home diuretics were held due to his CHRISSY. As patient had increased oxygen requirements to 70% fio2 on 50LPM pulm saw him and recommended transfer to the [facility] for RHC.  The patient does admit to having smoked earlier in his life, however he hasn't smoked in many years. 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 No other significant exposures.         Review of Systems:   CONSTITUTIONAL:  No fevers or chills  EYES: negative for icterus  ENT:  negative for hearing loss, tinnitus, complains of sore throat after HFNC was started  RESPIRATORY:  cough with sputum present, and dyspnea present and requiring O2 at rest  CARDIOVASCULAR:  negative for chest pain, dyspnea present  GASTROINTESTINAL:  negative for nausea, vomiting, diarrhea and constipation  GENITOURINARY:  negative for dysuria  HEME:  Easy bruising  INTEGUMENT:  negative for rash and pruritus  NEURO:  Negative for headache         Past Medical History:     Past Medical History:   Diagnosis Date    Anemia     Asthma without status asthmaticus 05/05/2021    Atrial fibrillation (H)     BPH (benign prostatic hyperplasia)     Cardiomyopathy (H)     CKD (chronic kidney disease) stage 3, GFR 30-59 ml/min (H) 05/24/2023    Congestive heart failure (H)     COPD, group B, by GOLD 2017 classification (H)     Coronary artery disease  due to calcified coronary lesion     Dyslipidemia, goal LDL below 70     Essential hypertension     Heart failure with reduced ejection fraction (H) 08/17/2023    Hemoptysis 10/04/2017    History of transfusion     Hyperlipidemia     Persistent atrial fibrillation (H)     Pneumonia of left lower lobe due to infectious organism 10/04/2017    Pulmonary hypertension (H)     Skin cancer of trunk     Status post catheter ablation of atrial fibrillation 06/07/2017    PVI 4-2011 (Cryo/PVI + roof line + CTI line) Re-do PVI 7-2011 (RFA/PVI + CFE + VIDYA + confirmed CTI line)    Ventricular tachycardia (H)             Past Surgical History:     Past Surgical History:   Procedure Laterality Date    CARDIAC DEFIBRILLATOR PLACEMENT      CARDIOVERSION  07/11/2018    x20, last 2/12/15, 10/2015, 11/18/16, 6/16/17 by Lauren Foster CNP    CARDIOVERSION  07/11/2018    CARDIOVERSION  11/19/2021    COLONOSCOPY N/A 04/28/2017    Procedure: COLONOSCOPY with 2 ascending polyps and 1 transverse polyp;  Surgeon: Jose Whittington MD;  Location: [facility] [place] [facility];  Service:     CORONARY ANGIOGRAPHY ADULT ORDER      CV CORONARY ANGIOGRAM N/A 09/20/2017    Procedure: Coronary Angiogram;  Surgeon: Sergio Cervantes MD;  Location: [facility] Cath Lab;  Service:     CV CORONARY ANGIOGRAM N/A 01/24/2022    Procedure: Coronary Angiogram;  Surgeon: Christi Saunders MD;  Location: [facility] LAB CV    CV LEFT HEART CATH N/A 01/24/2022    Procedure: Left Heart Cath;  Surgeon: Christi Saunders MD;  Location: [facility] CATH LAB CV    CV RIGHT AND LEFT HEART CATH N/A 01/24/2022    Procedure: Right and Left Heart Catherization;  Surgeon: Christi Saunders MD;  Location: [facility] CATH LAB CV    CV RIGHT HEART CATH MEASUREMENTS RECORDED N/A 3/12/2024    Procedure: Heart Cath Right Heart Cath;  Surgeon: Edgardo Deluna MD;  Location:  HEART CARDIAC CATH LAB    EP ICD GENERATOR REPLACEMENT DUAL N/A 10/28/2022    Procedure: Implantable Cardioverter  Defibrillator Generator Replacement Dual;  Surgeon: Elo Collins MD;  Location: [facility] CATH LAB CV    EP ICD INSERT      FRACTURE SURGERY Left     wrist    HEART CATH, ANGIOPLASTY      IMPLANT AUTOMATIC IMPLANTABLE CARDIOVERTER DEFIBRILLATOR      INGUINAL HERNIA REPAIR Left     while in the [employer] in [place] after 13 month in [place]    INSERT / REPLACE / REMOVE PACEMAKER      IR MISCELLANEOUS PROCEDURE  2014    OTHER SURGICAL HISTORY      left hand surgery---tendon repair    MD ABLATE HEART DYSRHYTHM FOCUS  2011    Catheter Ablation Atrial Fibrillation PVI 2011 (Cryo+RF-PVI + roof line + CTI line)    MD ABLATE HEART DYSRHYTHM FOCUS  2011    Re-do PVI 2011 (RFA-PVI + CFE + VIDYA + confirmation of CTI line)    TOTAL SHOULDER REPLACEMENT Right 2016    Dr. Abernathy of [facility]    WRIST SURGERY Left     ZZC MYERS W/O FACETEC FORAMOT/DSKC  VRT SEG, CERVICAL      Laminectomy Lumbar;  Recorded: 2012;            Family History:   Reviewed and non-contributory.   Family History   Problem Relation Age of Onset    Cancer Mother         leukemia    Cancer Father         bladder    Cancer Sister         breast with lung met.    Aneurysm Sister     CABG Brother     CABG Brother     Valvular heart disease Brother         valve replacement            Social History:     Social History     Tobacco Use    Smoking status: Former     Packs/day: 1.00     Years: 4.00     Additional pack years: 0.00     Total pack years: 4.00     Types: Cigarettes     Quit date: 1968     Years since quittin.2    Smokeless tobacco: Never   Substance Use Topics    Alcohol use: Yes     Alcohol/week: 2.0 standard drinks of alcohol     Comment: Alcoholic Drinks/day: 1 beer per week     History   Sexual Activity    Sexual activity: Yes    Partners: Female    Birth control/ protection: Post-menopausal            Current Medications:      acetylcysteine  4 mL Nebulization Q4H    And    albuterol   2.5 mg Nebulization Q4H    aspirin  81 mg Oral Daily    [Held by provider] atorvastatin  40 mg Oral QPM    bumetanide  1 mg Oral BID    empagliflozin  10 mg Oral Daily    FLUoxetine  20 mg Oral Daily    fluticasone  1 puff Inhalation Daily    fluticasone-vilanterol  1 puff Inhalation QPM    fluticasone-vilanterol  1 puff Inhalation Daily    levofloxacin  500 mg Oral Daily    methylPREDNISolone  100 mg Intravenous Once    metoprolol succinate ER  25 mg Oral Daily    pantoprazole  40 mg Oral QAM AC    polyethylene glycol  17 g Oral Daily    predniSONE  60 mg Oral Daily    Followed by    [START ON 3/16/2024] predniSONE  40 mg Oral Daily    Followed by    [START ON 3/18/2024] predniSONE  20 mg Oral Daily    Followed by    [START ON 3/20/2024] predniSONE  10 mg Oral Daily    Followed by    [START ON 3/22/2024] predniSONE  5 mg Oral Daily    Followed by    [START ON 3/24/2024] predniSONE  2.5 mg Oral Daily    sodium chloride (PF)  3 mL Intracatheter Q8H    umeclidinium  1 puff Inhalation Daily    vitamin C  1,000 mg Oral Daily    vitamin D2  50,000 Units Oral Once per day on Monday Thursday    warfarin ANTICOAGULANT  2 mg Oral ONCE at 18:00    Warfarin Therapy Reminder  1 each Oral See Admin Instructions            Allergies:     Allergies   Allergen Reactions    Adhesive Tape Other (See Comments)     ADHESIVE TAPE; SKIN IRRITATION; Skin pulled off with foam tape      Amiodarone      ADVERSE REACTION.  Sunlight sensitivity.    Lisinopril             Physical Exam:   Vitals were reviewed  Patient Vitals for the past 24 hrs:   BP Temp Temp src Pulse Resp SpO2 Weight   03/14/24 0800 -- 97.7  F (36.5  C) Oral 81 18 (!) 89 % --   03/14/24 0700 -- -- -- 81 20 97 % --   03/14/24 0636 -- -- -- -- -- 90 % --   03/14/24 0600 -- -- -- 82 19 94 % --   03/14/24 0500 -- -- -- 82 19 95 % --   03/14/24 0400 -- 98.5  F (36.9  C) Axillary 90 21 -- 97.4 kg (214 lb 12.8 oz)   03/14/24 0345 (!) 83/74 -- -- 84 18 92 % --   03/14/24 0300 -- --  -- 84 16 94 % --   03/14/24 0149 -- -- -- 83 17 91 % --   03/14/24 0100 -- -- -- 85 19 91 % --   03/14/24 0000 -- 98.4  F (36.9  C) Axillary 85 22 96 % --   03/13/24 2340 (!) 84/60 -- -- 86 17 97 % --   03/13/24 2300 -- -- -- 87 19 97 % --   03/13/24 2200 -- -- -- 89 21 (!) 88 % --   03/13/24 2100 -- -- -- 94 19 (!) 85 % --   03/13/24 2000 99/56 98.2  F (36.8  C) Axillary 92 20 91 % --   03/13/24 1939 -- -- -- -- -- 93 % --   03/13/24 1800 -- -- -- 90 17 (!) 84 % --   03/13/24 1700 -- -- -- 93 20 (!) 83 % --   03/13/24 1612 -- -- -- -- -- 92 % --   03/13/24 1600 109/73 97.5  F (36.4  C) Axillary 97 15 (!) 89 % --   03/13/24 1500 -- -- -- 86 22 95 % --   03/13/24 1400 -- -- -- 84 30 94 % --   03/13/24 1300 109/65 -- -- 93 19 95 % --   03/13/24 1225 -- -- -- -- -- 95 % --   03/13/24 1200 -- 97.4  F (36.3  C) Axillary 77 24 98 % --   03/13/24 1100 -- -- -- 87 21 98 % --       Physical Examination:  GENERAL:  well-developed, well-nourished, in bed in no acute distress.   HEENT:  Head is normocephalic, atraumatic   EYES:  Eyes have anicteric sclerae without conjunctival injection or stigmata of endocarditis.    ENT:  Oropharynx is moist without exudates or ulcers. Tongue is midline  NECK:  Supple. No cervical lymphadenopathy  LUNGS:  Clear to auscultation bilateral.   CARDIOVASCULAR:  Regular rate and rhythm with no murmurs, gallops or rubs.  ABDOMEN:  Normal bowel sounds, soft, nontender. No appreciable hepatosplenomegaly  SKIN:  No acute rashes.  Line(s) are in place without any surrounding erythema or exudate. No stigmata of endocarditis.  NEUROLOGIC:  Grossly nonfocal. Active x4 extremities         Laboratory Data:     Inflammatory Markers    Recent Labs   Lab Test 03/11/24  1028 02/15/22  0941   SED 56*  --    CRP  --  <2.9       Hematology Studies    Recent Labs   Lab Test 03/14/24  0622 03/12/24  1449 03/12/24  0542 03/11/24  0617 03/10/24  0623 03/08/24  1334 06/01/23  0539   WBC 11.9*  --  14.3* 15.1* 10.7 8.3  "12.6*   HGB 10.5* 11.0* 10.6* 11.8* 12.9* 13.2* 11.7*     --  100 100 101* 100 100     --  163  163 152 152 176 140*       Metabolic Studies     Recent Labs   Lab Test 03/14/24  0622 03/13/24  0644 03/12/24  1235 03/11/24  1620 03/11/24  0617    135 135 139 139  138   POTASSIUM 3.5 3.8  3.8 3.3* 3.4 3.6  3.4   CHLORIDE 99 97* 94* 95* 95*  95*   CO2 27 24 25 28 21*  27   BUN 74.7* 75.3* 77.0* 71.1* 64.7*  68.5*   CR 2.06* 2.04* 2.14* 2.24* 2.39*  2.36*   GFRESTIMATED 32* 33* 31* 29* 27*  27*       Hepatic Studies    Recent Labs   Lab Test 03/13/24  0644 03/11/24  0617 03/09/24  2315 05/20/23  0427 05/19/23  0446 05/18/23  0855 02/15/22  0941   BILITOTAL 1.2 1.8* 1.7*  --  1.0 1.0 0.5   ALKPHOS 61 66 77  --  56 69 56   ALBUMIN 3.4* 3.9 4.3 3.3* 3.3* 3.7 3.3*   AST 30 32 33  --  24 24 23   ALT 17 13 13  --  15 15 24       Microbiology:  All cultures:  Recent Labs   Lab 03/11/24  1553 03/11/24  1547 03/11/24  1100   CULTURE No growth after 2 days No growth after 2 days 3+ Staphylococcus aureus*  2+ Normal godwin        Respiratory Virus Testing    No results found for: \"RS\", \"FLUAG\"       CT CHEST (3/9):  IMPRESSION:   1.  Segments of cylindrical bronchiectasis with endobronchial mucosal thickening and secretions in the right middle lobe, lingula and both lower lobes as well as patchy airspace opacity in the adjacent lung parenchyma consistent with bronchopneumonia.   2.  Hyperinflation of the lungs and moderate mid and upper lung centrilobular emphysema.   3.  Moderate cardiomegaly and severe three-vessel coronary artery calcification.  4.  Enlargement central pulmonary arteries (MPA diameter 4.6 cm) consistent with chronic pulmonary arterial hypertension.      NM LUNG SCAN PERFUSION PARTICULATE (3/11)  Impression: Low probability of pulmonary embolism based on this perfusion only study.    RHC (3/12):  PCWP: 6 mmHg       PA: 57/20/35 mmHg (s/d/m)      RV: 57/5 mmHg (s/ed)      RA: --/17/11 " mmHg      CO 4.15 L/min (thermodilution); 4.51 L/min (Shikha)      CI: 1.83 L/min/m2 (TD); 1.98 L/min/m2 (Shikha)  PVR: 6.9 (TD) Wood Units   Right sided filling pressures are mildly elevated. Left sided filling pressures are normal. Moderately elevated pulmonary artery hypertension. Reduced cardiac output level.     CXR (3/14):  Impression:   1. No significant change in bibasilar pulmonary opacities representing ongoing infection versus atelectasis.  2. Unchanged cardiomegaly

## 2024-03-14 NOTE — PROGRESS NOTES
"RHEUMATOLOGY PROGRESS NOTE     Subjective      Buck Sinclair was seen and examined at bedside today  ROS      Objective   /72 (BP Location: Left arm)   Pulse 84   Temp 97.7  F (36.5  C) (Oral)   Resp 23   Ht 1.905 m (6' 3\")   Wt 97.4 kg (214 lb 12.8 oz)   SpO2 90%   BMI 26.85 kg/m        PHYSICAL EXAMINATION  Physical Exam  HENT:      Mouth/Throat:      Mouth: Mucous membranes are dry.      Comments: Oral thrush  Pulmonary:      Comments: Bilateral crackles  Abdominal:      General: Abdomen is flat.   Musculoskeletal:         General: No swelling or tenderness.           Assessment & Plan      # COPD  # Agent orange exposure  # Acute on chronic respiratory failure on home oxygen  # Pulmonary hypertension  # ischemic cardiomyopathy w/ HFrecEF  # HTN  # CAD s/p LAD PCI/CANDELARIA 2017  # CHRISSY on CKD3  # Depression  # Diabetes mellitus type 2    Rheumatology Service was asked to comment for underlying autoimmune connective tissue disease.      Buck denies Raynaud's phenomena, arthritis.  He denies ocular dryness and he has mild oral dryness attributable to the use of inhaler.  He has oropharyngeal dysphagia with oral thrush on exam and chronic COPD with dilatation of the esophagus.  There are no evidence of fingertip ulcerations. Autoimmune serologies were negative for DONNA, SSA, SSB, SCL 70, CCP, rheumatoid factor.    Anticentromere and RNP antibodies are pending    Clinical probability of systemic sclerosis is low and even if his antibodies come back as positive he will not fulfill classification criteria for systemic sclerosis.    He needs score of 9 points to fulfill criteria and so far he has only 2 points from his lung disease.  Esophageal dysmotility is not part of the classification criteria although seen within the spectrum of systemic sclerosis. Positive serology would only yield another 3 points if comes back positive so the maximum he would reach would be 5 points and this will not be sufficient " for classification for systemic sclerosis.     If his serology comes back as positive then he could fall within the spectrum of IPAF. We would defer to our colleagues in pulmonary for management of his underlying lung condition.     Rheumatology service will sign off.     Please feel free to reach out if you have any questions or concerns.     40 minutes spent by me on the date of the encounter doing chart review, history and exam, documentation and further activities per the note      Odessa Avila MD  Rheumatology attending

## 2024-03-15 NOTE — PLAN OF CARE
ICU End of Shift Summary. See flowsheets for vital signs and detailed assessment.    Changes this shift: VSS. SR w/ frequent PVC's. Normotensive. Afebrile. Neuro status unchanged. HFNC 45L 70-80%. Coughing up pinked tinged sputum with occasional clots. Denies SOB. Bipap NOC 40%. Adequate UOP. No BM this shift.     Plan: Wean O2 as tolerated.         Goal Outcome Evaluation:      Plan of Care Reviewed With: patient    Overall Patient Progress: no changeOverall Patient Progress: no change    Outcome Evaluation: VSS. Bipap NOC 40%. No acute events.

## 2024-03-15 NOTE — PLAN OF CARE
ICU End of Shift Summary. See flowsheets for vital signs and detailed assessment.    Changes this shift: A&Ox4. Denies pain. Denies N/V. Denies numbness and tingling.  HFNC 45-55% 45L titrated to Spo2 goal of 88-92%. Afib 80s-90s. Afebrile. One Large BM. Adequate UOP. Potassium replaced multiple times. Transfer orders to IMC.     Plan:  Transfer to floor when bed available. Wean Fio2 as able. Monitor HFNC for skin breakdown.   Goal Outcome Evaluation:      Plan of Care Reviewed With: patient, spouse    Overall Patient Progress: improvingOverall Patient Progress: improving    Outcome Evaluation: HFNC 45-55% 45L. Afib. regular diet. transfer orders.

## 2024-03-15 NOTE — PROGRESS NOTES
[facility]    Medicine Transfer to the Floor Progress Note - Hospitalist Service    Date of Admission:  3/9/2024    Assessment & Plan   Mr. Buck Sinclair is a 80 yo male with hx of depression, HLD, HTN, CAD s/p CANDELARIA placement (9/2017), CKDIII, afub s/p cardioversions and ablations in 2011 (on warfarin PTA), ICM s/p initial PPM placement in 1999 followed by ICD placement (6/2014), and chronic hypoxic respiratory failure with BL supplemental O2 requirement of 6-8L 2/2 CPFE and group III pulm HTN admitted initially to [facility] 3/8-3/9 for acute on chronic hypoxic respiratory failure and CHRISSY, transferred to Cards II service ICU at [facility] for higher level of care 3/9, now medically stable to transfer to [facility] floor on 3/15.     # Acute on chronic hypoxic respiratory failure  # COPD, Gold E  # Combined pulmonary fibrosis and emphysema (CPFE)  # Pulmonary HTN, likely Group 3 (2/2) COPD vs Group 1 (IPAF related)  # Community acquired pneumonia  PTA pt's BL supplemental O2 requirement 6-8L via NC. Admitted after presenting to OSH ED with severe dyspnea despite BL O2 needs, as well as CHRISSY. Initial workup including EKG and CXR unremarkable. Placed on Bipap and transferred to [facility] ICU for higher level of care. Placed initially on Bipap. Pulmonology and Cardiology consulted. After d/w cards, pt changed code status to DNR/DNI after being explained that no tx is available to treat his group 3 PH due to COPD. Viral panel neg but sputum culture grew 3+ staph aureus so started on abx's 3/11, given concurrent leukocytosis that has since resolved. Per pulmonology, felt his COPD not progressing. PE low on differential as V/Q scan neg. HF exacerbation not felt likely due to RHC findings stable compared to prior and initial concern for overdiuresis; however, due to elevated inflammatory markers combined with past PFT findings, pt's sicca symptoms, pulm HTN, and CT finding of esophageal  dilatation, pulmonology feels pts decompensation very likely is autoimmune in nature (IPAF) despite neg DONNA and other neg rheumatologic workup. Subsequently started on methylprednisone pulse therapy on 3/14. Pt now off Bipap but remains on 45L HFNC, but other VSS and pt states SOB has resolved.  - Pulmonology and Cardiology II consulted and appreciate recommendations.   - Continue IV methylprednisolone 100 mg daily x 3 days (3/14-3/16).   - Continue PTA Spivira, Symbicort and albuterol inhaler  - Continue scheduled nebs for now  - Wean off HFNC to PTA BL O2 requirements as able to keep sats between 88-92%  - Diuresis as below  - Continue Levaquin 500 mg po daily to complete 7 day course.     # ICM with recovered EF and mildly reduced RV function  # Chronic, persistent afib, on warfarin PTA  # HTN  # Hx of CAD s/p CANDELARIA placement (9/2017)  PTA on Toprol XL 25 mg daily, Bumex 2 mg BID, baby asa, warfarin, and empagliflozin. Recently saw his OP cardiology in clinic 3/1 for worsening dyspnea, of which his Bumex dose was increased to 4 mg BID and started on metolazone 5 mg daily. Was recommended to be hospitalized at that time but pt declined. Due to repeat labs revealing CHRISSY and ongoing persistent dyspnea, pt admitted to OSH 3/8 prior to transfer to [facility]. Repeat TTE 3/8 with est EF of 55-60%, mildly decreased RV systolic function, and findings consistent wiwth severe pulm HTN. RHC 3/9 with findings consistent with Group 3 PHTN, not amendable to vasodilator therapy. Started initially Bumex gtt, then transitioned to lower PTA dose of oral Bumex 0.5 mg BID. BPs stable. Denies CP.   - Cards II consulted and appreciate rec's  - Continue Bumex 0.5 mg po BID  - Continue PTA Toprol XL, baby asa, and empagliflozin  - Pharmacy consulted to dose daily warfarin to keep INR between 2-3    # CHRISSY on CKD3a, improving: PTA Cr BL 1.4-1.8. Peaked to 3.03 on 3/8 suspect 2/2 overdiuresis. Improving thereafter most recent Cr 1.92 (2.06). Pt  states having good UOP.   - Daily BMP  - Strict I&Os  - Daily wts  - Diuresis as above.     # Moderate acute on chronic oropharyngeal dysphagia: Speech following and VFSS revealed deep penetration with eventual aspiration after swallow followed by delayed cough with thin liquids.   - SLP consulted, appreciate rec's  - Continue minced and moist textues (5) with thin liquid (0) diet.     # DM2: On monotherapy with empagliflozin PTA. Most recent A1C 6.5% but from last October. BGs here stable.   Recent Labs   Lab 03/15/24  0457 03/14/24  0622 03/13/24  0644 03/12/24  1235 03/12/24  1011 03/11/24  1620   * 149* 178* 170* 168* 170*   - Add-on repeat A1C to labs already drawn  - Accuchecks TID before meals and at bedtime for now  - Keep BG <180  - Hypoglycemia protocols  - Continue PTA empagliflozin    # Oral thrush: Continue nystatin 087451 units QID, stared 3/14.    # Depression: Earlier in admission, pt and wife met with [facility] staff Dr. Villanueva on 3/10, of which she explained to them that there is no current therapies approved to treat pt's Group 3 PH due to COPD, however, pt and wife not amendable to discussion of palliatice care, comfort cares or hospice.   - Continue PTA fluoxetine 20 mg daily    # HLD: Continue PTA statin        Diet: Minced & Moist Diet (level 5) Thin Liquids (level 0)    DVT Prophylaxis: Warfarin  Sawyer Catheter: Not present  Lines: None     Cardiac Monitoring: ACTIVE order. Indication: Electrolyte Imbalance (24 hours)- Magnesium <1.3 mg/ml; Potassium < =2.8 or > 5.5 mg/ml  Code Status: No CPR- Do NOT Intubate       Expected Discharge Date: 03/18/2024      Destination: To home with PT vs TCU        The patient's care was discussed with the Attending Physician, Dr. Graves, Patient, and Patient's Family.    Franco Perez PA-C  Hospitalist Service  [facility]  Securely message with [employer] (more info)  Text page via [employer] Paging/[employer]    ______________________________________________________________________    Interval History   No acute events overnight. Denies fever, chills, chest pain, SOB, nausea, abd pain, bowel and bladder concerns. Had large BM today.     Physical Exam   Vital Signs: Temp: (!) 96.5  F (35.8  C) Temp src: Axillary BP: 112/82 Pulse: 97   Resp: 22 SpO2: 96 % O2 Device: High Flow Nasal Cannula (HFNC) Oxygen Delivery: 45 LPM  Weight: 214 lbs 12.8 oz  GEN: In NAD  HEENT: NCAT; PERRL; sclerae non-icteric  LUNGS: CTAB  CV: Irregularly irregular with 2+ murmur noted  ABD: +BSs; SNTND  EXT: 1+ BLE edema  SKIN: Multiple ecchymoses noted on extremities with no active bleeding. No acute rashes noted on exposed areas.  NEURO: AAOx3; CNs grossly intact; No acute focal deficits noted.      Medical Decision Making       60 MINUTES SPENT BY ME on the date of service doing chart review, history, exam, documentation & further activities per the note.      Data   CMP  Recent Labs   Lab 03/15/24  1044 03/15/24  0457 03/14/24  0622 03/13/24  0644 03/12/24  1235 03/12/24  1011 03/11/24  1620 03/11/24  0617 03/10/24  0623 03/09/24  2315   NA  --  138 139 135 135  --  139 139  138  --  140   POTASSIUM 3.4 3.2* 3.5 3.8  3.8 3.3*  --  3.4 3.6  3.4 3.8 3.3*   CHLORIDE  --  98 99 97* 94*  --  95* 95*  95*  --  94*   CO2  --  26 27 24 25  --  28 21*  27  --  31*   ANIONGAP  --  14 13 14 16*  --  16* 23*  16*  --  15   GLC  --  164* 149* 178* 170*   < > 170* 167*  175*  --  135*   BUN  --  74.6* 74.7* 75.3* 77.0*  --  71.1* 64.7*  68.5*  --  63.5*   CR  --  1.92* 2.06* 2.04* 2.14*  --  2.24* 2.39*  2.36*  --  2.48*   GFRESTIMATED  --  35* 32* 33* 31*  --  29* 27*  27*  --  26*   AMBIKA  --  9.1 9.2 9.2 9.5  --  9.7 9.8  9.7  --  10.2   MAG  --   --  2.2 2.4* 2.3  --  2.3 2.3  --  2.3   PHOS  --   --  4.5 4.4 4.1  --   --   --  2.4* 2.4*   PROTTOTAL  --   --   --  6.0*  --   --   --  7.0  --  7.5   ALBUMIN  --   --   --  3.4*  --   --   --  3.9  --   4.3   BILITOTAL  --   --   --  1.2  --   --   --  1.8*  --  1.7*   ALKPHOS  --   --   --  61  --   --   --  66  --  77   AST  --   --   --  30  --   --   --  32  --  33   ALT  --   --   --  17  --   --   --  13  --  13    < > = values in this interval not displayed.     CBC  Recent Labs   Lab 03/15/24  0457 03/14/24 0622 03/12/24  1449 03/12/24  0542 03/11/24  0617   WBC 9.4 11.9*  --  14.3* 15.1*   RBC 3.22* 3.20*  --  3.32* 3.63*   HGB 10.4* 10.5* 11.0* 10.6* 11.8*   HCT 31.9* 32.1*  --  33.1* 36.2*   MCV 99 100  --  100 100   MCH 32.3 32.8  --  31.9 32.5   MCHC 32.6 32.7  --  32.0 32.6   RDW 15.5* 15.5*  --  16.0* 15.8*    165  --  163  163 152     INR  Recent Labs   Lab 03/15/24  0457 03/14/24  0622 03/13/24  0644 03/12/24  1942   INR 1.95* 1.91* 1.79* 1.82*     Arterial Blood Gas  Recent Labs   Lab 03/14/24  1305 03/14/24  1221 03/14/24  0622 03/13/24  1218 03/10/24  0604 03/09/24  0924 03/09/24  0914   PH 7.46*  --   --  7.44 7.51*  --  7.51*   PCO2 39  --   --  39 38  --  42   PO2 89  --   --  197* 91  --  62*   HCO3 28  --   --  27 31*  --  33*   O2PER 70 70 90 70 80   < > 80    < > = values in this interval not displayed.

## 2024-03-15 NOTE — PROGRESS NOTES
Brief plan of care:    80yo male w/ significant chronic hypoxic respiratory failure (6-8L baseline), CPFE, group III pulmonary hypertension admitted to the MICU for progressive hypoxic respiratory failure. Initially evaluated by cardiology w/ reassuring RHC - mildly elevated right-sided filling pressures, nml left-sided filling pressures. Restarted gentle diuresis and seems to be tolerating well. Treating for acute exacerbation vs chronic inflammation in regards to underlying pulmonary disease - Levaquin and steroid burst (100mg qday x3 days, currently day 2). Stably improving level of respiratory support on HFNC. He is notably DNR/DNI.    Stable for transfer to [facility]. Transfer order placed for [facility] tele bed.    Full progress note to follow    Edison Mcgovern MD  Internal Medicine PGY-3  MICU Team 1

## 2024-03-15 NOTE — PROGRESS NOTES
MEDICAL ICU PROGRESS NOTE  03/15/2024      Date of Service (when I saw the patient): 03/15/2024    ASSESSMENT:   Buck Sinclair is a 79 year old male with a medical history significant for chronic respiratory failure on 6-8L NC, CPFE, Group III pulmonary HTN, CKD III, CAD s/p CANDELARIA x3 to the qnephfhy-[id number] LAD (9/2017), longstanding persistent AF s/p multiple electrical cardioversions and extensive catheter ablations x2 in 2011 (on coumadin), ischemic cardiomyopathy s/p Medtronic dual-chamber PPM in 1999 replaced with a Medtronic dual-chamber implantable cardiac defibrillator on 6/11/2014, MDD, HLD  who was admitted to the [facility] on 3/9/2024 for acute on chronic respiratory failure and acute kidney injury. His hospital stay was complicated by worsening respiratory status requiring transfer to the ICU. Has since been stable on HFNC vs BiPAP for ~48hrs w/ gentle diuresis and steroids.    CHANGES and MAJOR THINGS TODAY:   - Wean HFNC as able, otherwise BiPAP prn but may need RT to trouble shoot mask fit  - Methylpred 100mg qday x3 days (day 2 today)  - Continue Levaquin (7 day course)  - PPI BID  - Decrease Bumex to 0.5mg BID  - Transfer out of MICU    PLAN:    Neuro:   #Depression  - Continue PTA prozac     CV:  #Ischemic cardiomyopathy with recovered LV function and mildly reduced RV function  # Longstanding persistent AF s/p multiple electrical cardioversions and extensive catheter ablations x2 in 2011 (on coumadin)  #Essential hypertension  #CAD - LAD PCI/CANDELARIA 9/20/2017  ECHO 5/23: EF 55-60% but severe pulmonary HTN. ECHO 3/24: EF 55-60% no significant changes, severe RV dilation with moderately reduced function. Right Heart Cath 3/24: Moderately elevated pulmonary hypertension with mildly elevated right sided filling pressures.  - Diuresis: Bumex 0.5mg IV BID, caution w/ degree of preload dependence  - MAP > 65; HDS has remained off pressors  - Continue aspirin, statin  "  - Continue pta metoprolol succiante ER 25 mg daily (hold if SBP <110, or P <60)     Pulm:  # Combined Pulmonary Fibrosis and Emphysema   #COPD GOLD E; brief history of tobacco use and inhalational exposure working as a .   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  #Acute on chronic hypoxic respiratory failure  Reasons for current exacerbations could associated with infection, volume overload, and worsening pulmonary hypertension, and/or COPD exacerbation. On current appropriate abx regimen to cover for atypical infection. ABG/VBGs have been stable and tolerates HFNC w/ prn BiPAP. Has not had true hypercarbic respiratory failure, but at times has required the additional PPV to support oxygenation. Per PFTs in 2022 he also has overall decreased FVC and also decreased TLC, suggestive of restrictive pattern with possibly autoimmune etiology? See Pulmonary note for detail. VQ Scan 3/11/24 Negative for PE. CT 3/9/24 with \"Segments of cylindrical bronchiectasis with endobronchial mucosal thickening as well as patchy airspace opacity in the adjacent lung parenchyma consistent with bronchopneumonia\".  - Pulmonology is following, can continue to assist w/ management  - Continue PTA Spiriva, Symbicort, albuterol  - Supplemental oxygen to keep SpO2 88-92%  - Primarily on HFNC for respiratory support, can utilize BiPAP prn  - Methylprednisolone  mg qday x3 days (today is day 2)  - Levaquin for atypical coverage, 7 day course  - Diuresis as per above    GI/Nutrition:  No current concerns  - Speech Consulted, appreciate recs  - Okay for Diet, difficulties mostly with swallowing pills in the AM, will take with apple sauce   > Minced and moist with thin liquids   - Request Video swallow evaluation     Renal:  #CHRISSY on CKD3a, improving  Suspect initial injury due to overdiuresis, has been stabilizing w/ diuretic holiday and gentle restart of diuresis.  ~ Monitor I&O and Daily " "weights  ~ Diuresis as per above     ID:  #Community Acquired Pneumonia?  #Leukocytosis  Admitted with normal WBC count, peaked at 15.1 on HOD3, now trending down. Suspect atypical.. Respiratory Viral Panel is Negative  - Respiratory culture from 3/11 +3 Staph aureus     Plan:  ~ Current Antibiotics:  Vancomycin (3/11 - 3/13)  Ceftriaxone (3/11 - 3/13)  Doxycycline (3/12 - 3/13)  Levoquin (3/13 - )  ~ Levaquin, likely complete a total of 7 day treatment    Heme:  #Chronic Coagulopathy - on coumadin, INR goal 2-3    Endo:  #DM2  ~ Keep BG <180  ~ Hypoglycemia protocols  ~ Currently on empagliflozin     MSK/Rheum:  No current concerns  - Rheumatology consulted to evaluate for scleroderma vs autoimmune disease.  - DONNA and other Rheum labs negative, not likely to be scleroderma    General Cares/Prophylaxis:    DVT Prophylaxis: Warfarin  GI Prophylaxis:  On PPI, other indications  Restraints: Not Indicated  Family Communication: Spouse Neela [phone]   Code Status: No CPR- Do NOT Intubate    Lines/tubes/drains:  ~ PIV    Disposition:  ~ Medical ICU, transfer to [facility]    Patient seen and findings/plan discussed with medical ICU staff, Dr. Styles.    Edison Mcgovern MD  Internal Medicine PGY-3  MICU Team 1      ====================================  Interval: Seen sitting upright in bed this morning working w/ SLP. No acute concerns nor interval events overnight. On HFNC. Reports breathing as \"ok.\"    OBJECTIVE:   1. VITAL SIGNS:   Temp:  [96.5  F (35.8  C)-97.9  F (36.6  C)] 96.5  F (35.8  C)  Pulse:  [79-97] 97  Resp:  [15-31] 22  BP: ()/(53-97) 112/82  Cuff Mean (mmHg):  [81] 81  FiO2 (%):  [40 %-85 %] 45 %  SpO2:  [80 %-98 %] 96 %  FiO2 (%): 45 %  Resp: 22    2. INTAKE/ OUTPUT:   I/O last 3 completed shifts:  In: 400 [P.O.:400]  Out: 2125 [Urine:2125]    3. EXAM:  General: Sitting upright in bed, no acute distress, chronically hard of hearing but pleasantly conversational  Resp: CTA b/l, no increased WOB " overtly  CV: RRR, no new murmurs  Abd: Soft, NT/ND  Neuro: Awake, alert, appropriately conversational though chronically hard of hearing    4. LABS:   Arterial Blood Gases   Recent Labs   Lab 03/14/24  1305 03/13/24  1218 03/10/24  0604 03/09/24  0914   PH 7.46* 7.44 7.51* 7.51*   PCO2 39 39 38 42   PO2 89 197* 91 62*   HCO3 28 27 31* 33*     Complete Blood Count   Recent Labs   Lab 03/15/24  0457 03/14/24  0622 03/12/24  1449 03/12/24  0542 03/11/24  0617   WBC 9.4 11.9*  --  14.3* 15.1*   HGB 10.4* 10.5* 11.0* 10.6* 11.8*    165  --  163  163 152     Basic Metabolic Panel  Recent Labs   Lab 03/15/24  0457 03/14/24  0622 03/13/24  0644 03/12/24  1235    139 135 135   POTASSIUM 3.2* 3.5 3.8  3.8 3.3*   CHLORIDE 98 99 97* 94*   CO2 26 27 24 25   BUN 74.6* 74.7* 75.3* 77.0*   CR 1.92* 2.06* 2.04* 2.14*   * 149* 178* 170*     Liver Function Tests  Recent Labs   Lab 03/15/24  0457 03/14/24  0622 03/13/24  0644 03/12/24  1942 03/12/24  0542 03/11/24  0617 03/10/24  0623 03/09/24  2315   AST  --   --  30  --   --  32  --  33   ALT  --   --  17  --   --  13  --  13   ALKPHOS  --   --  61  --   --  66  --  77   BILITOTAL  --   --  1.2  --   --  1.8*  --  1.7*   ALBUMIN  --   --  3.4*  --   --  3.9  --  4.3   INR 1.95* 1.91* 1.79* 1.82*   < > 3.25*   < > 3.08*    < > = values in this interval not displayed.     Coagulation Profile  Recent Labs   Lab 03/15/24  0457 03/14/24  0622 03/13/24  0644 03/12/24  1942 03/11/24  0617 03/10/24  0623 03/09/24  2315   INR 1.95* 1.91* 1.79* 1.82*   < > 3.05* 3.08*   PTT  --   --   --   --   --  38 40*    < > = values in this interval not displayed.       5. RADIOLOGY:   Recent Results (from the past 24 hour(s))   XR Chest Port 1 View    Narrative    Exam: XR CHEST PORT 1 VIEW, 3/14/2024 11:28 AM    Comparison: 3/10/2024    History: f/u pneumonia    Findings:  Portable AP view of the chest. Stable enlarged cardiac silhouette.  Right chest wall implantable cardiac  defibrillator. No pneumothorax or  pleural effusion. No significant change in patchy bibasilar opacities.        Impression    Impression:   1. No significant change in bibasilar pulmonary opacities representing  ongoing infection versus atelectasis.  2. Unchanged cardiomegaly    I have personally reviewed the examination and initial interpretation  and I agree with the findings.    ROSAURA JONES MD         SYSTEM ID:  [id number]     Physician Attestation   Patient was independently examined and evaluated. In summary, 79 year old male with Advanced COPD, likely combined pulmonary fibrosis with emphysema (CPFE), Chronic hypoxic respiratory failure, moderate secondary pulmonary hypertension, CKD IIIA, CAD (s/p  PCI CANDELARIA x3 to the [id number] LAD, 9/2017), longstanding persistent Atrial Fibrillation s/p catheter ablations(on coumadin), ischemic cardiomyopathy with a Medtronic dual-chamber defibrillator (6/11/2014), MDD, HLD, admitted with acute on chronic hypoxic respiratory failure and acute kidney injury (03/09/2024). He had evidence of acute right heart decompensation, a transthoracic echocardiogram confirmed RV dilation. A right heart cath confirmed elevated RV pressures 57/5 mmHg, PAP 57/20 (35) mmHg. He was being aggressively diuresed and had contraction alkalosis, resulting in compensatory CO2 retention. I reviewed the CT chest images from 03/09/2024 and compared with previous studies. Findings are consistent with upper zones dominant extensive emphysema, basal reticular changes with some traction bronchiectasis suggestive of CPFE. An overlapping ILD e.g. airway centric fibrosis, NSIP is also possible. He does have B/L R>L nodular opacities most suggestive of infectious etiology.      He is tolerating high flow O2 better. Intermittent daytime and all nigh time BIPAP is well tolerated.      I have reviewed changes in critical data from the last 24 hours, including medications, laboratory results, vital  signs, active consults and radiograph results.      - Continue high (1mg/kg) dose of systemic steroids with gradual taper.  - Complete a 7 days course of Levofloxacin. Pharmacy to dose adjust warfarin.  - Continue IV diuresis with Bumex decrease dose to 0.5 mg with close monitoring of renal function.    I personally spent 30 minutes of critical care time reviewing labs and imaging, examining the patient, managing the ventilator. This does not include time spent on procedures or teaching.       Jensen Becerra MD  Pulmonary and Critical Care Medicine  Date of Service (when I saw the patient): 03/15/24

## 2024-03-15 NOTE — CONSULTS
Patient does not meet testing criteria for [facility] [facility] testing. No precautions or testing is needed. Please reach out with questions.     Thank you.     Odessa Garcia IP

## 2024-03-16 NOTE — PROVIDER NOTIFICATION
"   03/16/24 1600   Call Information   Date of Call 03/16/24   Time of Call 1543   Name of person requesting the team Kiet GOLDMAN   Title of person requesting team RN   RRT Arrival time 1549   Time RRT ended 1606   Reason for call   Type of RRT Adult   Primary reason for call Sepsis suspected   Sepsis Suspected Elevated Lactate level;RR > 20, SaO2 <90% OR increasing O2 need   Was patient transferred from the ED, ICU, or PACU within last 24 hours prior to RRT call? No   SBAR   Situation RRT called for elevated lactic acid level, and FiO2 at 75%.   Background Per recent teams note, \"Mr. Buck Sinclair is a 78 yo male with hx of depression, HLD, HTN, CAD s/p CANDELARIA placement (9/2017), CKDIII, afub s/p cardioversions and ablations in 2011 (on warfarin PTA), ICM s/p initial PPM placement in 1999 followed by ICD placement (6/2014), and chronic hypoxic respiratory failure with BL supplemental O2 requirement of 6-8L 2/2 CPFE and group III pulm HTN admitted initially to [facility] 3/8-3/9 for acute on chronic hypoxic respiratory failure and CHRISSY, transferred to Cards II service ICU at [facility] for higher level of care 3/9, now medically stable to transfer to IMC floor on 3/15.\"   Notable History/Conditions COPD;Cardiac;Hypertension   Assessment Pt in semi-folwers position in bed, VSS and sats 88-97% on HFNC, reports baseline O2 use of 5-6L on NC at home. Slightly tachypneic on arrival to RRT at 30BPM, and at end of rapid, down to 24BPM. RT and bedside nurse at bedside.   Interventions CXR;Fluid bolus;Meds  (adjusted meds)   Adjustments to Recommend continue to monitor O2 sats and maintain above 88%, wean FiO2 as tolerated and slowly   Patient Outcome   Patient Outcome Stabilized on unit   RRT Team   Attending/Primary/Covering Physician Charlie Zamora, Dr. Miller   Date Attending Physician notified 03/16/24   Time Attending Physician notified 1549   Physician(s) Mayur Tiwari PA-C   Lead FENG GOLDMAN   RT Milan PEREZ, " Pedro ALCALA   Post RRT Intervention Assessment   Post RRT Assessment Stable/Improved   Date Follow Up Done 03/16/24   Time Follow Up Done 5212   Comments Pt denies any changes, remains on HFNC, sats stable, and denies SOB

## 2024-03-16 NOTE — PROGRESS NOTES
[facility]    Medicine Progress Note - Hospitalist Service, GOLD TEAM 7    Date of Admission:  3/9/2024    Assessment & Plan   Mr. Buck Sinclair is a 80 yo male with hx of depression, HLD, HTN, CAD s/p CANDELARIA placement (9/2017), CKDIII, afub s/p cardioversions and ablations in 2011 (on warfarin PTA), ICM s/p initial PPM placement in 1999 followed by ICD placement (6/2014), and chronic hypoxic respiratory failure with BL supplemental O2 requirement of 6-8L 2/2 CPFE and group III pulm HTN admitted initially to [facility] 3/8-3/9 for acute on chronic hypoxic respiratory failure and CHRISSY, transferred to Cards II service ICU at [facility] for higher level of care 3/9, now medically stable to transfer to [facility] floor on 3/15.     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)  3) Pulmonary HTN, Group 3 (2/2) COPD - RHC this hospital stay confirmed not candidate for vasodilator  4) Community acquired pneumonia with respiratory culture positive for MSSA  Initial presentation with dyspnea and CHRISSY  - finished burst IV methylprednisolone 100 mg daily x 3 days (3/14-3/16), paged pulm to ask about taper plan   - Continue PTA Spivira, Symbicort and albuterol inhaler; scheduled nebs = NAC and albuterol q4h  - wean HFNC to PTA BL O2 requirements as able to keep sats between 88-92%  - Continue Levaquin 500 mg po daily to complete 7 day course (3/13-19)    CV  1) Ischemic Cardiomyopathy with recovered EF (55-60%) and moderately reduced RV function  2) Chronic, persistent afib, on warfarin (INR goal 2-3)  3) Benign Essential (primary) HTN  4) CAD s/p CANDELARIA placement (9/2017)  5) Dyslipidemia   Home meds: Toprol XL 25 mg daily, Bumex 2 mg BID, 81 mg aspirin, warfarin, empagliflozin, atorvastatin 40  In Hospital: bumex lower at 0.5 mg BID, pharmacy dosing warfarin,  unheld atorvastatin 3/16, rest unchanged     CHRISSY on CKD3a due to overdiuresis/prerenal etiology, improved without complete recovery: baseline Cr 1.5-1.7. Peaked to 3.03 on 3/8   Bumex was doubled outpatient for dyspnea to  4 mg BID and metolazone started on 3/1  - strict I/O, daily weights, continue current bumex dose of 0.5 mg BID  - plateau in improvement may be due to some concomitant ATN; as he seems to be having good UOP will continue to monitor; presently euvolemic on examination     Moderate acute on chronic oropharyngeal dysphagia: Speech following video swallow performed  - Continue minced and moist textues (5) with thin liquid (0) diet; added Ensure today and can speak with SLP about advancing as able     DM2: A1C 6.9%, on monotherapy with empagliflozin PTA; stop BG checks as patient's BG's have been within goal for entirety of hospital stay (140-180)    Oral thrush: Continue nystatin [id number] units QID x 10 days total    Major Depressive Disorder: home fluoxetine 20 mg daily      Diet: Minced & Moist Diet (level 5) Thin Liquids (level 0)  Snacks/Supplements Adult: Ensure Enlive; Between Meals    DVT Prophylaxis: Warfarin  Sawyer Catheter: Not present  Lines: None     Cardiac Monitoring: ACTIVE order. Indication: Electrolyte Imbalance (24 hours)- Magnesium <1.3 mg/ml; Potassium < =2.8 or > 5.5 mg/ml  Code Status: No CPR- Do NOT Intubate      Amaury Miller, DO  Hospitalist Service, GOLD TEAM 7  [facility]  Securely message with [employer] (more info)  Text page via [facility] Paging/Directory   See signed in provider for up to date coverage information  ______________________________________________________________________    Interval History   Declined BiPAP due to claustrophobia/restriction associated with mask. Says he doesn't use CPAP at night at home. Appetite is just 'ok', not a big fan of the hospital food or his minced and moist restrictions. Feels his breathing  has improved. Asked about outpatient dose of bumex, felt it was dehydrating him and that the dose he is on now produces good results. No chest pressure/pain. No swelling.    Physical Exam   Vital Signs: Temp: 98.8  F (37.1  C) Temp src: Oral BP: 94/64 Pulse: 93   Resp: 20 SpO2: 90 % O2 Device: High Flow Nasal Cannula (HFNC) Oxygen Delivery: 55 LPM  Weight: 214 lbs 12.8 oz    sitting in bed in no distress  Awake, alert, answers questions and follows commands  Respiratory rate and work of breathing are normal  HRRR with +3/6 early blowing systolic murmur, extremities warm and well perfused  Abdomen is soft, non tender, non distended  Extremities are without edema or cyanosis    MDM: High  See problem list above  Reviewed ICU notes, pulm note, BMP, Mg, Phos, glucoses, CBC, INR, video swallow  High risk given ongoing need for high FiO2 and HFNC

## 2024-03-16 NOTE — PROGRESS NOTES
Pt. Ref BIPAP at this time after multiple attempts. HFNC 55L 50%, tolerating well. NAD noted at this time. Nursing will cont. To monitor.

## 2024-03-16 NOTE — PLAN OF CARE
"/72   Pulse 83   Temp 97.5  F (36.4  C) (Oral)   Resp 20   Ht 1.905 m (6' 3\")   Wt 99.4 kg (219 lb 1.6 oz)   SpO2 92%   BMI 27.39 kg/m      Hours of Care: 8475-0886.    Neuro: AO x 4.  Hard of hearing.  Vitals: VSS.   Respiratory: On 70% FiO2 at 50L.  Cardiac: A. Fib but tele discontinued.    GI/: WDL.  Skin/Wounds: WDL.  Lines: R PIV SL.  Diet: Minced and moist with thin liquids.  Activity: Up SBA.  Pain: Denies.    Code sepsis called due to a lactic acid of 4.8 at approximately 4PM.  CXR, 500cc LR bolus, and blood cultures ordered. (Initial lactic acid of 3.7 at approximately 12:30)      Continue to monitor and follow POC    Kiet Baker RN on 3/16/2024 at 6:22 PM    6B-Intermediate Care              "

## 2024-03-16 NOTE — PROGRESS NOTES
Transfer  Transferred from:   Via:bed  Reason for transfer: Pt appropriate for 6B- pt has improved with IMC orders.  Family: Aware of transfer  Belongings: Received with pt  Chart: Received with pt  Medications: Meds received from old unit with pt  Code Status verified on armband: yes  2 RN Skin Assessment Completed By: Tolu GOLDMAN, and Anselmo PEREZ  Med rec completed: yes  Suction/Ambu bag/Flowmeter at bedside: yes    Report received from: FENG Cadena  Pt status: Resting comfortably.    Kiet Baker RN on 3/16/2024 at 12:15 PM

## 2024-03-16 NOTE — PROGRESS NOTES
Sepsis Evaluation     Addendum 1550: notified that lactic 4.8 now (no intervention after prior); FiO2 up to 75%. I don't think he's septic. He's on appropriate broad spectrum abx for MSSA CAP. Last blood cultures from 3/11 negative. Bp's look steady without hypotension.     Can repeat CXR. Will order 500 mL bolus of LR, believe he would tolerate this. Can additionally repeat blood cultures. Frankly, I'm treating the number. Would broaden with a beta lactam if needed overnight.     I was called to see Buck Sinclair due to  Fi02 need went from 50 to 70%, lactic 3.7 . He is known to have an infection.   Patient is feeling well, no change from this morning. Denies increased dyspnea. New hypoxemia is not associated with eating, was noted upon transfer from ICU to . Patient's wife is present.    PHYSICAL EXAM  Vital Signs:  Temp: 97.5  F (36.4  C) Temp src: Oral BP: 120/85 Pulse: 88   Resp: 20 SpO2: (!) 89 % O2 Device: High Flow Nasal Cannula (HFNC) Oxygen Delivery: 50 LPM    Awake, alert  Speech is clear and coherent  RR and WOB are normal - unchanged from this morning  HRRR, some PVC's evident on the monitor  Abdomen is soft, non tender, non distended    POCUS of jugular vein with patient reclined to 45 degrees demonstrates JVD to about 4 cm above the right clavicle  POCUS of IVC demonstrates IVC right at or a little less than 2 cm in diameter with around 25% respirophasic variability, short axis view looks quite circular indicative of elevated CVP - appearance comparable to on echo 3/10 with perhaps decreased diameter    DATA  Lactic Acid   Date Value Ref Range Status   03/16/2024 3.7 (H) 0.7 - 2.0 mmol/L Final   03/10/2024 1.9 0.7 - 2.0 mmol/L Final       ASSESSMENT AND PLAN  NO EVIDENCE OF SEPSIS at this time.  Vital sign, physical exam, and lab findings are due to sporadic mucous plugging. Lactic harder to explain, appears to have bounced around between near 2 and 3.2 during hospitalization, haven't checked  since 3/10. We can recheck in the AM or sooner if crumps. No fluids are warranted at this time based on euvolemia without evidence of worsening infection.     Added incentive spirometer and acapella for additional pulm hygiene.    Disposition: The patient will remain on the current unit. We will continue to monitor this patient closely..  Amaury Miller,   03/16/24, 1:09 PM

## 2024-03-16 NOTE — CODE/RAPID RESPONSE
"Brief Rapid Response Note      Responded to Sepsis BPA for Mr. Sinclair for LA 4.8 (3.7- no intervention). Primary team has ordered CXR, BCx2, and 500 ml IVF bolus. Repeat LA in 3h. Patient is on abx for MSSA CAP. Bedside evaluation reveals patient sitting up in bed on HFNC at 70% with sats 95%. BP stable. HR 80s-irregular. Afebrile. Patient reports that he is feeling better today then in days past. No Fever, chills, HA, abdominal pain, CP, cough, increase in sob, or kaminski from BL. Will continue to closely monitor. Plan to broaden abx if any change in clinical course.     Please see excellent sepsis note from Dr. Miller for complete details.   - IVF bolus  - CXR  - BCx2  - Repeat LA In 3 hours      Physical Exam   Constitutional:  Pleasant elderly male sitting up in bed, HOD, though conversant. Wife at bedside.  Well nourished, well developed, resting comfortably   HEENT:   Head: Normocephalic and atraumatic.   Eyes: Conjunctivae are normal. Pupils are equal, round, and reactive to light.  Pharynx has no erythema or exudate, mucous membranes are moist  Neck:   No adenopathy, no bony tenderness  Cardiovascular: Irregularly Irregular   Pulmonary/Chest:  with No wheezes or retractions. No respiratory distress on HFNC.  GI: Soft with good bowel sounds.  Non-tender, non-distended, with no guarding, no rebound, no peritoneal signs.   Back:  No bony or CVA tenderness   Musculoskeletal:  No edema or clubbing   Skin: Skin is warm and dry. No rash noted.   Neurological: Alert and oriented to person, place, and time. Nonfocal exam  Psychiatric:  Normal mood and affect.      /72   Pulse 83   Temp 97.5  F (36.4  C) (Oral)   Resp 20   Ht 1.905 m (6' 3\")   Wt 99.4 kg (219 lb 1.6 oz)   SpO2 92%   BMI 27.39 kg/m        Natalie De La Cruz PA-C  Internal Medicine Hospitalist Service  [facility]  Pager: [phone]    "

## 2024-03-16 NOTE — PROGRESS NOTES
Brief Pulmonary Note    Patient on stable oxygen requirements alternating between HFNC/BiPAP.  Previously discussed w/ Dr. Styles (ICU staff), plan to continue methylpred 100 mg over the weekend (ordered for 3 more doses), pulmonary to determine further taper plan 3/18. Continue pulmonary hiygene in interim. Plan discussed w/ primary team.     Pulm/CC Fellow  Ester Green

## 2024-03-16 NOTE — PROGRESS NOTES
Transfer  Transferred to: 6B  Via:bed  Reason for transfer:Pt no longer appropriate for 4C MICU improved patient condition  Family at bedside with patient.  Belongings: Packed and sent with pt  Chart: Delivered with pt to next unit  Medications: Meds sent to new unit with pt  Report given to: Tolu ELLINGTON RN  Pt status:  Alert and oriented x 4. VSS. On high flow O2 50% FIO2 55 LPM, switched to oxy mask for transfer to 6B. Standing weight 219 lb. Transferring in bed with flyer, RT and family. 6B aware en route.

## 2024-03-17 NOTE — CODE/RAPID RESPONSE
Rapid Response Team Note    Assessment   In assessment a rapid response was called on Buck Sinclair due to lactic acidosis, W/ repeat LA 4.8(4.8, 3.7). CXR w/ cardiomegaly w/ bibasilar presumed subsegmental atelectasis vs interstitial edema.  BP soft at 93/64 w/ MAP of 75. HR 83. Patient noted to have O2 desaturation to the 70s with exertion when ambulating to the bathroom. RT at bedside and initially on 100% FiO2 via HFNC. Patient reports that he will significantly desat with exertion to the 30s (?) PTA. Able to wean FiO2 to 70% with O2 sats in the mid 90s. Plan to broaden abx tonight- Zosyn and check a VBG. Repeat LA in 2 hours, MRSA nasal swab, UA. Will also obtain an EKG and troponin. Discontinue PO Levaquin. POC discussed with patient, bedside RN and RT at bedside. Per bedside RN, patient declines use of BiPAP. Will await VBG results and discuss need for BiPAP if necessary. Diuretics have shay on hold so judicious use of IVF.     Plan   - Broaden abx to Zosyn  - Discontinue Levaquin  - Check VBG, Repeat LA at 2200, EKG and troponin  - MRSA nasal swab  - Covid, Influenza, RSV swab  - Continue to closely monitor  -  The Internal Medicine primary team was able to be reached and they are in agreement with the above plan.  -  Disposition: The patient will remain on the current unit. We will continue to monitor this patient closely.  -  Reassessment and plan follow-up will be performed by the primary team      Addendum: RRT called for repeat LA 5.2 (4.8, 4.8). No change in clinical status. Continues to desat with exertion. Will repeat LA for trend.     Natalie De La Cruz PA-C  [facility] RRT [facility] Job Code Contact #[phone]  [facility] Paging/Directory    Hospital Course   Brief Summary of events leading to rapid response:   Responded to patients room for repeat LA of 4.8 (4.8). Patient does not currently have any complaints. He reports that he has significant decline in O2 sats with minimal exertion at home when  "discussing the increase in O2 needs. No complaints of CP, cough, URI sx, Abdominal pain, dysuria. We discussed POC as outlined above.     Admission Diagnosis:   Pulmonary hypertension (H) [I27.20]    Physical Exam   Temp: 98.4  F (36.9  C) Temp  Min: 96.8  F (36  C)  Max: 98.8  F (37.1  C)  Resp: 20 Resp  Min: 18  Max: 25  SpO2: 90 % SpO2  Min: 89 %  Max: 96 %  Pulse: 83 Pulse  Min: 83  Max: 95    No data recorded  BP: 93/64 Systolic (24hrs), Av , Min:93 , Max:120   Diastolic (24hrs), Av, Min:64, Max:85     I/Os: I/O last 3 completed shifts:  In: 845 [P.O.:835; I.V.:10]  Out: 1575 [Urine:1575]     Exam:   General: chronically ill appearing  Mental Status: AAOx4.  Resp: Breathing mildly labored on 100% Fi)2 HFNC- able to wean to 70%. Diminished bilaterally  CV: Irregular      Significant Results and Procedures   Lactic Acid:   Recent Labs   Lab Test 24  1903 24  1524 24  1227   LACT 4.8* 4.8* 3.7*     CBC:   Recent Labs   Lab Test 24  0601 03/15/24  0457 24  0622   WBC 11.4* 9.4 11.9*   HGB 10.2* 10.4* 10.5*   HCT 32.2* 31.9* 32.1*    175 165        Sepsis Evaluation   The patient is known to have an infection.  Buck Sinclair meets SIRS criteria AND has a lactate >2 or other evidence of acute organ damage.  These vital signs, lab and physical exam findings constitute a diagnosis of SEVERE SEPSIS, based on: Lactate resulted, and the level was > 2.0          Anti-infectives (From now, onward)      Start     Dose/Rate Route Frequency Ordered Stop    24  piperacillin-tazobactam (ZOSYN) 4.5 g vial to attach to  mL bag        Note to Pharmacy: For [facility], [facility] and [facility]: For Zosyn-naive patients, use the \"Zosyn initial dose + extended infusion\" order panel.    4.5 g  over 30 Minutes Intravenous EVERY 6 HOURS 24            Current antibiotic coverage is appropriate for source of infection.    3 Hour Severe Sepsis Bundle Completion:  1. Initial Lactic " Acid result shown above. Repeat lactic acid ordered by reflex for 3 hours from initial collection.  2. Blood Cultures before Antibiotics: Yes  3. Broad Spectrum Antibiotics Administered: yes  4. Is hypotension present? No (IV fluid bolus NOT required). IV Fluid volume administered: 250cc x1

## 2024-03-17 NOTE — PLAN OF CARE
"/72 (BP Location: Right arm)   Pulse 87   Temp 97.6  F (36.4  C) (Oral)   Resp 20   Ht 1.905 m (6' 3\")   Wt 99.4 kg (219 lb 1.6 oz)   SpO2 92%   BMI 27.39 kg/m       Neuro: AO x 4.  Hard of hearing.  Vitals: VSS.   Respiratory: On 70-90% FiO2 at 50L HFNC.  Used BiPap for 2 hours at 60% FiO2  Cardiac: A. Fib but tele discontinued.    GI/: WDL.  Skin/Wounds: WDL.  Lines: PIV x 2 SL.  Diet: Minced and moist with thin liquids.  Activity: Up SBA.  Pain: Denies.      Continue to monitor and follow POC     Kiet Baker RN on 3/17/2024 at 6:25 PM     6B-Intermediate Care    "

## 2024-03-17 NOTE — PROGRESS NOTES
"   03/16/24 2200   Call Information   Date of Call 03/16/24   Time of Call 2110   Name of person requesting the team Shahram CARIAS   Title of person requesting team RN   RRT Arrival time 2119   Time RRT ended 2125   Reason for call   Type of RRT Adult   Primary reason for call Sepsis suspected   Sepsis Suspected Elevated Lactate level   Was patient transferred from the ED, ICU, or PACU within last 24 hours prior to RRT call? No   SBAR   Situation lactic acid level 5.2   Background Per recent teams note, \"Mr. Buck Sinclair is a 78 yo male with hx of depression, HLD, HTN, CAD s/p CANDELARIA placement (9/2017), CKDIII, afub s/p cardioversions and ablations in 2011 (on warfarin PTA), ICM s/p initial PPM placement in 1999 followed by ICD placement (6/2014), and chronic hypoxic respiratory failure with BL supplemental O2 requirement of 6-8L 2/2 CPFE and group III pulm HTN admitted initially to [facility] 3/8-3/9 for acute on chronic hypoxic respiratory failure and CHRISSY, transferred to Cards II service ICU at [facility] for higher level of care 3/9, now medically stable to transfer to C floor on 3/15.\"   Notable History/Conditions COPD;Cardiac;Hypertension   Assessment In bed, in low semi-Valle's position, AAOx4, pleasant, cooperative, agreeable, answered questions appropriately, denied having any pain or feeling short of breath, followed commands promptly. Breathing regular, unlabored, with equal chest expansion, and on O2 via HFNC; clear to the upper lobes and diminished to RML and bases on auscultation.   Interventions Fluid bolus   Patient Outcome   Patient Outcome Stabilized on unit   RRT Team   Attending/Primary/Covering Physician Gold 7   Date Attending Physician notified 03/16/24   Time Attending Physician notified 2110   Physician(s) Natalie KRUEGER (PA)   Lead RN Gertrude CARAIS   RT n/a   Post RRT Intervention Assessment   Post RRT Assessment Stable/Improved   Date Follow Up Done 03/16/24   Time Follow Up Done 2330 "   Comments latest lactic acid level 3.8

## 2024-03-17 NOTE — PLAN OF CARE
"BP 90/56 (BP Location: Right arm)   Pulse 89   Temp 97.8  F (36.6  C) (Oral)   Resp 20   Ht 1.905 m (6' 3\")   Wt 99.4 kg (219 lb 1.6 oz)   SpO2 93%   BMI 27.39 kg/m      Shift events: Lactic trending up in evening; started zosyn and bolused 250 mL. Blood gas obtained with no acidosis/hypercarbia. O2 sats drop precipitously with any activity and recovery takes several minutes. Lactic continuing to trend down this AM. Oxygen requirements stable overnight.    Neuro: A&Ox4; very [place].  CV: No tele orders. BP soft with SBP in 90s and MAPs 65-70.  Resp: HFNC with 80% FiO2 and 50 L flowrate. Requires 100% with any activity.  GI: BM x1  : Voids spontaneously. Uses urinal independently.  Diet/nutrition: Minced and moist diet.  Skin: No new deficits noted.  Activity: A1.  LDA: PIV x2    Plan: Continue to monitor respiratory status.        "

## 2024-03-17 NOTE — PROGRESS NOTES
[facility]    Medicine Progress Note - Hospitalist Service, GOLD TEAM 7    Date of Admission:  3/9/2024    Assessment & Plan   Mr. Buck Sinclair is a 80 yo male with hx of depression, HLD, HTN, CAD s/p CANDELARIA placement (9/2017), CKDIII, afub s/p cardioversions and ablations in 2011 (on warfarin PTA), ICM s/p initial PPM placement in 1999 followed by ICD placement (6/2014), and chronic hypoxic respiratory failure with BL supplemental O2 requirement of 6-8L 2/2 CPFE and group III pulm HTN admitted initially to [facility] 3/8-3/9 for acute on chronic hypoxic respiratory failure and CHRISSY, transferred to Cards II service ICU at [facility] for higher level of care 3/9, now medically stable to transfer to [facility] [place] floor on 3/15.     Discussed refractory hypoxemia today with pulm, recommending repeat trial of BiPAP - 2 hours now, then again overnight. Discussed with patient and he is amenable to trying. Communicated to RT and nurse.     Lactic acidosis 3/16, r/o sepsis; suspect type B due to albuterol  - lactate trended down with 750 mL of IVF total, holding bumex, and starting pip/tazo; notably, he's on q4h albuterol and didn't get it between 7p and 5a -- morning lactic acid was much lower at 2.1; ordered 2 lactates for tomorrow morning - the first to be drawn an hour prior to his first albuterol and the second to be drawn an hour after to confirm my hypothesis  - the S aureus that grew from the airway was pan susceptible, though levofloxacin was not tested (91% susc according to antibiogram), suppose that his MSSA could be a resistant strain or that he has a Gram negative pneumonia, think this is unlikely  - stopped pip/tazo and restarted 500 mg oral levofloxacin, has a few days left    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)  3) Pulmonary HTN,  Group 3 (2/2) COPD - RHC this hospital stay confirmed not candidate for vasodilator  4) Community acquired pneumonia with respiratory culture positive for MSSA  Initial presentation with dyspnea and CHRISSY  - burst IV methylprednisolone 100 mg daily x 5 days (3/14-3/18), pulm to address taper plan tomorrow 3/18   - home inhaler regimen = symbicort, tiotropium, albuterol; spoke with pharmacist and hospital regimen that best mimics home regimen with formulary meds is once daily breo ellipta and umeclidium (getting LABA, LAMA, ICS); scheduled nebs = NAC and albuterol q4h, pulm said to continue through the weekend  - wean HFNC to PTA BL O2 requirements as able to keep sats between 88-92%; SpO2 bottoms out with any activity/movement and he has to be turned up to 100% when he transfers to the commode; FiO2 up considerably since transfer to the floor, suspected possible mucous plugging and started IS and flutter valve 3/16 but 1 view CXR from 3/16 without evidence of new lobar collapse/mucous plugging  --> still trending better when FiO2's considered over course of week and patient no more dyspneic/no real clinical change; continue to monitor  - Continue Levaquin 500 mg po daily to complete 7 day course (3/13-19)    CV  1) Ischemic Cardiomyopathy with recovered EF (55-60%) and moderately reduced RV function  2) Chronic, persistent afib, on warfarin (INR goal 2-3)  3) Benign Essential (primary) HTN  4) CAD s/p CANDELARIA placement (9/2017)  5) Dyslipidemia   Home meds: Toprol XL 25 mg daily, Bumex 2 mg BID, 81 mg aspirin, warfarin, empagliflozin, atorvastatin 40  In Hospital: bumex lower at 0.5 mg BID (held evening 3/16), pharmacy dosing warfarin, unheld atorvastatin 3/16, rest unchanged     CHRISSY on CKD3a due to overdiuresis/prerenal etiology, improved without complete recovery: baseline Cr 1.5-1.7. Peaked to 3.03 on 3/8   Bumex was doubled outpatient for dyspnea to  4 mg BID and metolazone started on 3/1  - strict I/O, daily weights,  holding current bumex dose of 0.5 mg BID since evening 3/16  - plateau in improvement may be due to some concomitant ATN     Moderate acute on chronic oropharyngeal dysphagia: Speech following video swallow performed  - Continue minced and moist textues (5) with thin liquid (0) diet; added Ensure 3/16     DM2: A1C 6.9%, on monotherapy with empagliflozin PTA; stop BG checks as patient's BG's have been within goal for entirety of hospital stay (140-180)    Oral thrush: Continue nystatin [id number] units QID x 10 days total    Major Depressive Disorder: home fluoxetine 20 mg daily      Diet: Minced & Moist Diet (level 5) Thin Liquids (level 0)  Snacks/Supplements Adult: Ensure Enlive; Between Meals    DVT Prophylaxis: Warfarin  Sawyer Catheter: Not present  Lines: PIV rt forearm  Cardiac Monitoring: None  Code Status: No CPR- Do NOT Intubate      Amaury Miller,   Hospitalist Service, GOLD TEAM [address] [place] [address]  Securely message with [employer] (more info)  Text page via [employer] Paging/Directory   See signed in provider for up to date coverage information  ______________________________________________________________________    Interval History   Rapid/code sepsis called yesterday for elevated lactate, which was drawn due to increased FiO2 need. Patient says that he was no more short of breath and is actually feeling better. He desaturates precipitously when he does minimal activity like shuffling in bed or eating or getting up to the commode. Denies feeling swollen or chest pain/pressure.     Physical Exam   Vital Signs: Temp: 97.8  F (36.6  C) Temp src: Oral BP: 90/56 Pulse: 89   Resp: 20 SpO2: 91 % O2 Device: High Flow Nasal Cannula (HFNC) Oxygen Delivery: 50 LPM  Weight: 219 lbs 1.6 oz    lying in bed in no distress  Awake, alert, answers questions and follows commands  Respiratory rate and work of breathing are normal, quite diminished LLL relative to RLL  Irregularly  irregular heart rhythm with rate < 100 and +3/6 early blowing systolic murmur, extremities warm and well perfused  Abdomen is soft, non tender, non distended  Extremities are without edema or cyanosis    MDM: high  See problem list above  Reviewed vitals last 7 days, lactates over night and this morning, ordered 2 lactates for tomorrow, reviewed BMP/Mg/Phos, CBC, INR, UA from overnight, CXR from overnight and 3/14 for comparison  high risk due to increased oxygen needs, P/F 122, use of HFNC to maintain SpO2 at appropriate levels

## 2024-03-18 NOTE — PROGRESS NOTES
PULMONARY CONSULTATION: Progress Note     Patient:  Buck Sinclair   Date of birth 1945, Medical record number [id number]  Date of Visit:  03/18/2024  Date of Admission: 3/9/2024  Consult Requester: Velia Cabrera MD  Reason for Consult: To assess COPD severity          Assessment and Recommendations:     ASSESSMENT:  Mr. Buck Sinclair is a 79 y.o. male patient with PMHx of chronic respiratory failure on 6-8L NC home O2 at baseline, Pulmonary HTN (last RHC in 2022 with mPAP= 37mmHg, repeat RHC this admission), CKD IIIA, CAD s/p CANDELARIA x3 to the hsupblaw-kf-fnyace LAD (9/2017), longstanding persistent AF s/p multiple electrical cardioversions and extensive catheter ablations x2 in 2011 (on coumadin), ischemic cardiomyopathy s/p Medtronic dual-chamber PPM in 1999 replaced with a Medtronic dual-chamber implantable cardiac defibrillator on 6/11/2014, MDD, HLD. He was admitted on 3/8/2024 with acute on chronic RS failure and acute renal failure with concerns for possible over diuresis and was transferred to [facility] for a RHC. He has been on home oxygen since 4 years, initially on 2-4L NC, but slowly progressed to a baseline of 6-8L NC. Currently his requirement is % FiO2 at 45LPM via HFNC. Currently, the concern if for Interstitial Pneumonia with Autoimmune Features (IPAF).    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 Per his  PFTs dated 10/7/22, FEV1/FVC is 61%, significant for obstructive pathology. CT (3/9) shows hyperinflation of the lungs and moderate mid and upper lung centrilobular emphysema, with NSIP pattern as well. Overall his COPD seems to have remained the same and is not progressing. The reasons for his current exacerbation may be PE, infections, volume overload, and worsening PHTN. Pulmonary embolism has been ruled out by NM VQ scan. PHTN per RHC on 3/12/2024 (mPAP=35) is stable as compared to that on 1/24/2022 (mPAP=37). He was also euvolemic on exam, and the concern previously was for overdiuresis and not volume overload. We sent a complete viral panel and sputum cultures on him. Viral panel negative. However, sputum growing 3+ Staph aureus, sensitivities awaited. Most likely commensal. MRSA swab negative. However, CRP and ESR are elevated, pointing more towards infectious vs inflammatory/autoimmune etiology for his current exacerbation.    Per the PFTs from 2022 he also has overall decreased FVC and also a decreased TLC, suggestive of restrictive pattern. This makes us think in terms of ILD and other restrictive pathologies. Given the pulmonary HTN, dysphagia/ esophageal dysmotility, sicca symptoms and NSIP pattern on CT chest, with restrictive pattern on PFTs, we were initially thinking in terms of Interstitial Pneumonia with Autoimmune Features (IPAF). We started him on pulsed steroids, however there was no significant improvement in O2 requirements, and they in fact went up over the weekend.    No known history of autoimmune disease in him or his family. No obvious skin changes per our examination. No joint pains. Keeping systemic sclerosis under high suspicion, we sent a full ILD panel on him, but came back negative for ANAs, SS-A and B,  Edita-1, Scl-70, RA, and CCP. HSP panel negative. Anticentromere and RNP antibodies negative. Overall this patient's condition does not seem autoimmune in nature. Per cardiology, there  are no options for pulmonary vasodilator therapy in this patient.  Will try to manage him symptomatically and try to wean him off oxygen in the coming days.     RECOMMENDATION:  Taper prednisone to 60 mg daily  Titrate FiO2 to goal SpO2 88-92%    Thank you for this consult.    Patient was discussed with Dr. Servando Gamble.     Sai BRISCOE  Medical Student  ________________________________________________________________    Consult Question: To assess COPD severity.  Admission Diagnosis: Pulmonary hypertension (H) [I27.20]         Interval History:   No overnight events. Patient feels subjectively better. No worsening dyspnea. No fevers or chills.          History of Present Illness:     Mr. Buck Sinclair is a 79 y.o. male patient with PMHx of chronic respiratory failure on 6-8L NC home O2 at baseline, Pulmonary HTN (last RHC in 2022 with mPAP= 37mmHg, repeat RHC this admission), CKD IIIA, CAD s/p CANDELARIA x3 to the krrgyjxe-oe-gfmqeu LAD (9/2017), longstanding persistent AF s/p multiple electrical cardioversions and extensive catheter ablations x2 in 2011 (on coumadin), ischemic cardiomyopathy s/p Medtronic dual-chamber PPM in 1999 replaced with a Medtronic  dual-chamber implantable cardiac defibrillator on 6/11/2014, MDD, HLD who was admitted on 3/8/2024 with acute on chronic RS failure and acute renal failure with concerns for possible over diuresis who was transferred for a RHC. Patient notes he has been having increased SOB with increased aggressive outpatient diuresis, he had a worsening creatinine causing him to present to the ED. He received 500cc of IV fluids in the ED. His home diuretics were held due to his CHRISSY. As patient had increased oxygen requirements to 70% fio2 on 50LPM pulm saw him and recommended transfer to the [place] for RHC.  The patient does admit to having smoked earlier in his life, however he hasn't smoked in many years. 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 No other significant exposures.         Review of Systems:   CONSTITUTIONAL:  No fevers or chills  EYES: negative for icterus  ENT:  negative for hearing loss, tinnitus, complains of sore throat after HFNC was started  RESPIRATORY:  cough with sputum present, and dyspnea present and requiring O2 at rest  CARDIOVASCULAR:  negative for chest pain, dyspnea present  GASTROINTESTINAL:  negative for nausea, vomiting, diarrhea and constipation  GENITOURINARY:  negative for dysuria  HEME:  Easy bruising  INTEGUMENT:  negative for rash and pruritus  NEURO:  Negative for headache         Past Medical History:     Past Medical History:   Diagnosis Date    Anemia     Asthma without status asthmaticus 05/05/2021    Atrial fibrillation (H)     BPH (benign prostatic hyperplasia)     Cardiomyopathy (H)     CKD (chronic kidney disease) stage 3, GFR 30-59 ml/min (H) 05/24/2023    Congestive heart failure (H)     COPD, group B, by GOLD 2017 classification (H)     Coronary artery disease due to calcified coronary lesion     Dyslipidemia, goal LDL below 70     Essential hypertension     Heart failure with reduced ejection fraction (H) 08/17/2023    Hemoptysis 10/04/2017    History of transfusion     Hyperlipidemia     Persistent atrial fibrillation (H)     Pneumonia of left lower lobe due to infectious organism 10/04/2017    Pulmonary hypertension (H)     Skin cancer of trunk     Status post catheter ablation of atrial fibrillation 06/07/2017    PVI 4-2011 (Cryo/PVI + roof line + CTI line) Re-do PVI 7-2011 (RFA/PVI + CFE + VIDYA + confirmed CTI line)    Ventricular tachycardia (H)             Past Surgical History:     Past Surgical History:   Procedure Laterality Date    CARDIAC DEFIBRILLATOR PLACEMENT      CARDIOVERSION  07/11/2018    x20, last 2/12/15, 10/2015, 11/18/16, 6/16/17 by Lauren Foster CNP    CARDIOVERSION   07/11/2018    CARDIOVERSION  11/19/2021    COLONOSCOPY N/A 04/28/2017    Procedure: COLONOSCOPY with 2 ascending polyps and 1 transverse polyp;  Surgeon: Jose Whittington MD;  Location: [facility] GI;  Service:     CORONARY ANGIOGRAPHY ADULT ORDER      CV CORONARY ANGIOGRAM N/A 09/20/2017    Procedure: Coronary Angiogram;  Surgeon: Sergio Cervantes MD;  Location: [facility] Cath Lab;  Service:     CV CORONARY ANGIOGRAM N/A 01/24/2022    Procedure: Coronary Angiogram;  Surgeon: Christi Saunders MD;  Location: [facility] CATH LAB CV    CV LEFT HEART CATH N/A 01/24/2022    Procedure: Left Heart Cath;  Surgeon: Christi Saunders MD;  Location: [facility] CATH LAB CV    CV RIGHT AND LEFT HEART CATH N/A 01/24/2022    Procedure: Right and Left Heart Catherization;  Surgeon: Christi Saunders MD;  Location: [facility] CATH LAB CV    CV RIGHT HEART CATH MEASUREMENTS RECORDED N/A 3/12/2024    Procedure: Heart Cath Right Heart Cath;  Surgeon: Edgardo Deluna MD;  Location:  HEART CARDIAC CATH LAB    EP ICD GENERATOR REPLACEMENT DUAL N/A 10/28/2022    Procedure: Implantable Cardioverter Defibrillator Generator Replacement Dual;  Surgeon: Elo Collins MD;  Location: [facility] CATH LAB CV    EP ICD INSERT      FRACTURE SURGERY Left     wrist    HEART CATH, ANGIOPLASTY      IMPLANT AUTOMATIC IMPLANTABLE CARDIOVERTER DEFIBRILLATOR      INGUINAL HERNIA REPAIR Left 1967    while in the [employer] in [employer] after 13 month in [place]    INSERT / REPLACE / REMOVE PACEMAKER      IR MISCELLANEOUS PROCEDURE  04/30/2014    OTHER SURGICAL HISTORY      left hand surgery---tendon repair    AL ABLATE HEART DYSRHYTHM FOCUS  04/2011    Catheter Ablation Atrial Fibrillation PVI Apr 2011 (Cryo+RF-PVI + roof line + CTI line)    AL ABLATE HEART DYSRHYTHM FOCUS  07/2011    Re-do PVI Jul 2011 (RFA-PVI + CFE + VIDYA + confirmation of CTI line)    TOTAL SHOULDER REPLACEMENT Right 03/03/2016    Dr. Abernathy of [facility]    WRIST  SURGERY Left     ZZC MYERS W/O FACETEC FORAMOT/DSKC  VRT SEG, CERVICAL      Laminectomy Lumbar;  Recorded: 2012;            Family History:   Reviewed and non-contributory.   Family History   Problem Relation Age of Onset    Cancer Mother         leukemia    Cancer Father         bladder    Cancer Sister         breast with lung met.    Aneurysm Sister     CABG Brother     CABG Brother     Valvular heart disease Brother         valve replacement            Social History:     Social History     Tobacco Use    Smoking status: Former     Packs/day: 1.00     Years: 4.00     Additional pack years: 0.00     Total pack years: 4.00     Types: Cigarettes     Quit date: 1968     Years since quittin.2    Smokeless tobacco: Never   Substance Use Topics    Alcohol use: Yes     Alcohol/week: 2.0 standard drinks of alcohol     Comment: Alcoholic Drinks/day: 1 beer per week     History   Sexual Activity    Sexual activity: Yes    Partners: Female    Birth control/ protection: Post-menopausal            Current Medications:      acetylcysteine  2 mL Nebulization Q4H    And    albuterol  2.5 mg Nebulization Q4H    aspirin  81 mg Oral Daily    atorvastatin  40 mg Oral QPM    bumetanide  0.5 mg Oral BID    empagliflozin  10 mg Oral Daily    FLUoxetine  20 mg Oral Daily    fluticasone-vilanterol  1 puff Inhalation Daily    levofloxacin  750 mg Oral Q48H    metoprolol succinate ER  25 mg Oral Daily    nystatin  500,000 Units Swish & Swallow 4x Daily    pantoprazole  40 mg Oral BID AC    polyethylene glycol  17 g Oral BID    predniSONE  60 mg Oral Daily    sodium chloride (PF)  3 mL Intracatheter Q8H    thiamine  100 mg Oral Daily    umeclidinium  1 puff Inhalation Daily    vitamin C  1,000 mg Oral Daily    vitamin D2  50,000 Units Oral Once per day on     warfarin ANTICOAGULANT  1 mg Oral ONCE at 18:00    Warfarin Therapy Reminder  1 each Oral See Admin Instructions            Allergies:     Allergies    Allergen Reactions    Adhesive Tape Other (See Comments)     ADHESIVE TAPE; SKIN IRRITATION; Skin pulled off with foam tape      Amiodarone      ADVERSE REACTION.  Sunlight sensitivity.    Lisinopril             Physical Exam:   Vitals were reviewed  Patient Vitals for the past 24 hrs:   BP Temp Temp src Pulse Resp SpO2 Weight   03/18/24 1510 94/62 97.6  F (36.4  C) Oral 84 18 95 % --   03/18/24 1501 -- -- -- -- -- 95 % --   03/18/24 1435 99/66 97.5  F (36.4  C) Oral 83 18 96 % --   03/18/24 1400 -- -- -- -- -- -- 102.5 kg (225 lb 15.5 oz)   03/18/24 1141 -- -- -- -- -- 92 % --   03/18/24 0947 98/72 -- -- 86 -- -- --   03/18/24 0851 -- -- -- -- -- 97 % --   03/18/24 0738 (!) 128/92 97.6  F (36.4  C) Oral 81 18 97 % --   03/18/24 0415 100/74 97.6  F (36.4  C) Oral -- 19 95 % --   03/17/24 2305 99/66 97.6  F (36.4  C) Axillary -- 20 96 % --   03/17/24 2040 -- -- -- -- -- 92 % --   03/17/24 1936 108/79 97.5  F (36.4  C) Axillary -- 20 92 % --       Physical Examination:  GENERAL:  well-developed, well-nourished, in bed in no acute distress.   HEENT:  Head is normocephalic, atraumatic   EYES:  Eyes have anicteric sclerae without conjunctival injection or stigmata of endocarditis.    ENT:  Oropharynx is moist without exudates or ulcers. Tongue is midline  NECK:  Supple. No cervical lymphadenopathy  LUNGS:  Clear to auscultation bilateral.   CARDIOVASCULAR:  Regular rate and rhythm with no murmurs, gallops or rubs.  ABDOMEN:  Normal bowel sounds, soft, nontender. No appreciable hepatosplenomegaly  SKIN:  No acute rashes.  Line(s) are in place without any surrounding erythema or exudate. No stigmata of endocarditis.  NEUROLOGIC:  Grossly nonfocal. Active x4 extremities         Laboratory Data:     Inflammatory Markers    Recent Labs   Lab Test 03/11/24  1028 02/15/22  0941   SED 56*  --    CRP  --  <2.9       Hematology Studies    Recent Labs   Lab Test 03/18/24  0413 03/17/24  0517 03/16/24  0601 03/15/24  0457  "03/14/24  0622 03/12/24  1449 03/12/24  0542   WBC 11.2* 11.9* 11.4* 9.4 11.9*  --  14.3*   HGB 9.8* 9.2* 10.2* 10.4* 10.5* 11.0* 10.6*   * 98 98 99 100  --  100    157 188 175 165  --  163  163       Metabolic Studies     Recent Labs   Lab Test 03/18/24  0413 03/17/24  0517 03/16/24  0601 03/15/24  2047 03/15/24  1705 03/15/24  1044 03/15/24  0457 03/14/24  0622    139 138  --   --   --  138 139   POTASSIUM 4.5 4.4 4.3 4.2 4.1   < > 3.2* 3.5   CHLORIDE 103 102 100  --   --   --  98 99   CO2 27 25 25  --   --   --  26 27   BUN 54.8* 60.8* 68.6*  --   --   --  74.6* 74.7*   CR 1.78* 1.83* 1.92*  --   --   --  1.92* 2.06*   GFRESTIMATED 38* 37* 35*  --   --   --  35* 32*    < > = values in this interval not displayed.       Hepatic Studies    Recent Labs   Lab Test 03/13/24  0644 03/11/24  0617 03/09/24  2315 05/20/23  0427 05/19/23  0446 05/18/23  0855 02/15/22  0941   BILITOTAL 1.2 1.8* 1.7*  --  1.0 1.0 0.5   ALKPHOS 61 66 77  --  56 69 56   ALBUMIN 3.4* 3.9 4.3 3.3* 3.3* 3.7 3.3*   AST 30 32 33  --  24 24 23   ALT 17 13 13  --  15 15 24       Microbiology:  All cultures:  Recent Labs   Lab 03/16/24  1622 03/16/24  1614   CULTURE No growth after 1 day No growth after 1 day        Respiratory Virus Testing    No results found for: \"RS\", \"FLUAG\"       CT CHEST (3/9):  IMPRESSION:   1.  Segments of cylindrical bronchiectasis with endobronchial mucosal thickening and secretions in the right middle lobe, lingula and both lower lobes as well as patchy airspace opacity in the adjacent lung parenchyma consistent with bronchopneumonia.   2.  Hyperinflation of the lungs and moderate mid and upper lung centrilobular emphysema.   3.  Moderate cardiomegaly and severe three-vessel coronary artery calcification.  4.  Enlargement central pulmonary arteries (MPA diameter 4.6 cm) consistent with chronic pulmonary arterial hypertension.      NM LUNG SCAN PERFUSION PARTICULATE (3/11)  Impression: Low probability " of pulmonary embolism based on this perfusion only study.    RHC (3/12):  PCWP: 6 mmHg       PA: 57/20/35 mmHg (s/d/m)      RV: 57/5 mmHg (s/ed)      RA: --/17/11 mmHg      CO 4.15 L/min (thermodilution); 4.51 L/min (Shikha)      CI: 1.83 L/min/m2 (TD); 1.98 L/min/m2 (Shikha)  PVR: 6.9 (TD) Wood Units   Right sided filling pressures are mildly elevated. Left sided filling pressures are normal. Moderately elevated pulmonary artery hypertension. Reduced cardiac output level.     CXR (3/16):  IMPRESSION: Cardiomegaly with bibasilar presumed subsegmental atelectasis versus interstitial edema. No radiographic evidence for lobar collapse or mucous plugging.     FELLOW ATTESTATION  I was present with the medical student, who participated in the service and in the documentation of the note.  I have verified the history and personally performed the physical exam and medical decision making.  I agree with the assessment and plan of care as documented in the note. Suggest starting to reduce steroids to prednisone 60mg daily. Staffed with Dr. Gamble.

## 2024-03-18 NOTE — PLAN OF CARE
Occupational Therapy: Orders received. Chart reviewed and discussed with care team.? Occupational Therapy not indicated due to pt mobilizing well with PT, primarily limited by respiratory status at this time. Anticipate pt IP rehab needs can be met by one discipline.? Defer discharge recommendations to PT and medical team.? Will complete orders.

## 2024-03-18 NOTE — PROGRESS NOTES
[facility]    Medicine Progress Note - Hospitalist Service, GOLD TEAM 7    Date of Admission:  3/9/2024    Assessment & Plan   Mr. Buck Sinclair is a 78 yo male with hx of depression, HLD, HTN, CAD s/p CANDELARIA placement (9/2017), CKDIII, afub s/p cardioversions and ablations in 2011 (on warfarin PTA), ICM s/p initial PPM placement in 1999 followed by ICD placement (6/2014), and chronic hypoxic respiratory failure with BL supplemental O2 requirement of 6-8L 2/2 CPFE and group III pulm HTN admitted initially to [facility] 3/8-3/9 for acute on chronic hypoxic respiratory failure and CHRISSY, transferred to Cards II service ICU at [facility] for higher level of care 3/9, transferred to medicine 3/15.      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)  3) Pulmonary HTN, Group 3 (2/2) COPD - RHC this hospital stay confirmed not candidate for vasodilator  4) Community acquired pneumonia with respiratory culture positive for MSSA  Initial presentation with dyspnea and CHRISSY  - burst IV methylprednisolone 100 mg daily x 5 days (3/14-3/18), pulm to address taper plan today 3/18   - home inhaler regimen = symbicort, tiotropium, albuterol; spoke with pharmacist and hospital regimen that best mimics home regimen with formulary meds is once daily breo ellipta and umeclidium (getting LABA, LAMA, ICS); scheduled nebs = NAC and albuterol q4h  - placed on BiPAP yesterday for 2 hours, then again overnight and FiO2 was able to be reduced to about 50%; patient ok with continuing night-time BiPAP  - wean HFNC to baseline 6-8 Lpm via NC as able to keep sats between 88-92%;  - the S aureus that grew from the airway was pan susceptible, 91% susc to levofloxacin according to antibiogram  - Continue Levaquin 500 mg po daily to complete 7 day course (3/13-19)    CV  1) Ischemic  Cardiomyopathy with recovered EF (55-60%) and moderately reduced RV function  2) Chronic, persistent afib, on warfarin (INR goal 2-3)  3) Benign Essential (primary) HTN  4) CAD s/p CANDELARIA placement (9/2017)  5) Dyslipidemia   Home meds: Toprol XL 25 mg daily, Bumex 2 mg BID, 81 mg aspirin, warfarin, empagliflozin, atorvastatin 40  In Hospital: bumex lower at 0.5 mg BID (held evening 3/16), pharmacy dosing warfarin, unheld atorvastatin 3/16, rest unchanged     Lactic acidosis 3/16, r/o sepsis; suspect type B due to albuterol  - relatively certain that the spike in lactic acidosis is due to 1) scheduled albuterol, 2) reduced clearance/metabolism from kidney disease -- would expect lower lactic acid levels early in the day, higher in the evening    CHRISSY on CKD3a due to overdiuresis/prerenal etiology, improving without complete recovery: baseline Cr 1.5-1.7. Peaked to 3.03 on 3/8, today's 1.78  Bumex was doubled outpatient for dyspnea to 4 mg BID and metolazone started on 3/1  - strict I/O, daily weights, resumed bumex 0.5 mg BID afternoon 3/17 after holding for a day  - plateau in improvement in CHRISSY may be due to some concomitant ATN, urine output has been steady     Moderate acute on chronic oropharyngeal dysphagia: Speech following video swallow performed  - Continue minced and moist textues (5) with thin liquid (0) diet; added Ensure 3/16   - patient pretty unhappy with the diet, SLP to continue working with him to advance    DM2: A1C 6.9%, on monotherapy with empagliflozin PTA; stop BG checks as patient's BG's have been within goal for entirety of hospital stay (140-180)    Oral thrush: Continue nystatin [id number] units QID x 10 days total    Major Depressive Disorder: home fluoxetine 20 mg daily        Diet: Minced & Moist Diet (level 5) Thin Liquids (level 0)  Snacks/Supplements Adult: Ensure Enlive; Between Meals    DVT Prophylaxis: Warfarin  Sawyer Catheter: Not present  Lines: None     Cardiac Monitoring: None  Code  Status: No CPR- Do NOT Intubate      Amaury Miller, DO  Hospitalist Service, GOLD TEAM 7  [facility]  Securely message with [employer] (more info)  Text page via [employer] Paging/Directory   See signed in provider for up to date coverage information  ______________________________________________________________________    Interval History   No acute changes for patient with regards to symptoms. He is feeling better, tolerated the BiPAP ok yesterday though he's not wild about it. Upset with the dietary restrictions of minced/moist and liquids. No more dyspneic, not feeling swollen, normal urine output.    Physical Exam   Vital Signs: Temp: 97.6  F (36.4  C) Temp src: Oral BP: 98/72 Pulse: 86   Resp: 18 SpO2: 97 % O2 Device: High Flow Nasal Cannula (HFNC) Oxygen Delivery: 50 LPM  Weight: 219 lbs 1.6 oz    lying in bed in no distress  Awake, alert, answers questions and follows commands  Respiratory rate normal, work of breathing minimally elevated with some belly breathing  Abdomen is soft, non tender, non distended  Extremities are without edema or cyanosis    MDM: high  See problem list above  Reviewed CBC, renal panel, Mg, lactic acids, INR, overnight vitals while on BiPAP   high risk due to continued need for HFNC and BiPAP to maintain SpO2

## 2024-03-18 NOTE — PLAN OF CARE
"/74 (BP Location: Right arm)   Pulse 87   Temp 97.6  F (36.4  C) (Oral)   Resp 19   Ht 1.905 m (6' 3\")   Wt 99.4 kg (219 lb 1.6 oz)   SpO2 95%   BMI 27.39 kg/m      Shift events: Wore BIPAP continuously overnight. Lactates checked before and after albuterol nebs with increase from 1.7 to 1.8.      Neuro: A&Ox4; very [place].  CV: No tele orders. BP soft with SBP in 90-100s and MAPs 65-70.  Resp: BiPAP 50-60% overnight. HFNC with 80% FiO2 and 50 L flowrate. Requires 100% with any activity.  GI: BM x1  : Voids spontaneously. Uses urinal independently.  Diet/nutrition: Minced and moist diet.  Skin: No new deficits noted.  Activity: A1.  LDA: PIV x2     Plan: Continue to monitor respiratory status.  "

## 2024-03-18 NOTE — PLAN OF CARE
Neuro: A&Ox4.   Cardiac: VSS.   Respiratory: Sating  on HFNC %, needs more O2 with activity   GI/: Adequate urine output. BM last 3/17  Diet/appetite: Tolerating Level 6 diet. Eating well, speech following   Activity:  Assist of x1 up to chair and in room   Pain: At acceptable level on current regimen.   Skin: No new deficits noted.  LDA's: PIV x2 SL'd     Plan: Continue with POC. Notify primary team with changes.

## 2024-03-19 NOTE — PROGRESS NOTES
PULMONARY CONSULTATION: Progress Note     Patient:  Buck Sinclair   Date of birth 1945, Medical record number [id number]  Date of Visit:  03/19/2024  Date of Admission: 3/9/2024  Consult Requester: Velia Cabrera MD  Reason for Consult: To assess COPD severity          Assessment and Recommendations:     ASSESSMENT:  Mr. Buck Sinclair is a 79 y.o. male patient with PMHx of chronic respiratory failure on 6-8L NC home O2 at baseline, Pulmonary HTN (last RHC in 2022 with mPAP= 37mmHg, repeat RHC this admission), CKD IIIA, CAD s/p CANDELARIA x3 to the ggagjdmo-fr-mbotwi LAD (9/2017), longstanding persistent AF s/p multiple electrical cardioversions and extensive catheter ablations x2 in 2011 (on coumadin), ischemic cardiomyopathy s/p Medtronic dual-chamber PPM in 1999 replaced with a Medtronic dual-chamber implantable cardiac defibrillator on 6/11/2014, MDD, HLD. He was admitted on 3/8/2024 with acute on chronic RS failure and acute renal failure with concerns for possible over diuresis and was transferred to [facility] for a RHC. He has been on home oxygen since 4 years, initially on 2-4L NC, but slowly progressed to a baseline of 6-8L NC. Currently he requires HFNC. The concern initially was for Interstitial Pneumonia with Autoimmune Features (IPAF). Heb was started on steroid pulse therapy. However given lack of subjective improvement, now tapering the steroids. Planning to wean him off oxygen, back to his baseline.    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 Per his PFTs dated 10/7/22, FEV1/FVC is 61%, significant for obstructive pathology. CT (3/9) shows hyperinflation of the lungs and moderate mid and upper lung centrilobular emphysema, with NSIP pattern as well. Overall his COPD seems to have remained the same and is not progressing. The reasons for his current exacerbation may be PE, infections, volume overload, and worsening PHTN. Pulmonary embolism has been ruled out by NM VQ scan. PHTN per RHC on 3/12/2024 (mPAP=35) is stable as compared to that on 1/24/2022 (mPAP=37). He was also euvolemic on exam, and the concern previously was for overdiuresis and not volume overload. We sent a complete viral panel and sputum cultures on him. Viral panel negative. However, sputum growing 3+ Staph aureus, sensitivities awaited. Most likely commensal. MRSA swab negative. However, CRP and ESR are elevated, pointing more towards infectious vs inflammatory/autoimmune etiology for his current exacerbation.    Per the PFTs from 2022 he also has overall decreased FVC and also a decreased TLC, suggestive of restrictive pattern. This makes us think in terms of ILD and other restrictive pathologies. Given the pulmonary HTN, dysphagia/ esophageal dysmotility, sicca symptoms and NSIP pattern on CT chest, with restrictive pattern on PFTs, we were initially thinking in terms of Interstitial Pneumonia with Autoimmune Features (IPAF). We started him on pulsed steroids, however there was no significant improvement in O2 requirements, and they in fact went up over the weekend.    No known history of autoimmune disease in him or his family. No obvious skin changes per our examination. No joint pains. Keeping systemic sclerosis under high suspicion, we sent a full ILD panel on him, but came back negative for ANAs, SS-A and B,  Edita-1, Scl-70, RA, and CCP. HSP panel  negative. Anticentromere and RNP antibodies negative. Overall this patient's condition does not seem autoimmune in nature. Per cardiology, there are no options for pulmonary vasodilator therapy in this patient.  Will try to manage him symptomatically and try to wean him off oxygen in the coming days.     RECOMMENDATION:  Continue prednisone 60 mg od. To taper steroids gradually.  Wean and titrate FiO2 to goal SpO2 88-92%.   Continue incentive spirometry    Thank you for this consult.    Patient was discussed with Dr. Servando Gamble.     Sai BRISCOE  Medical Student  ________________________________________________________________    Consult Question: To assess COPD severity.  Admission Diagnosis: Pulmonary hypertension (H) [I27.20]         Interval History:   No overnight events. Patient feels okay. No worsening dyspnea. No fevers or chills.          History of Present Illness:     Mr. Buck Sinclair is a 79 y.o. male patient with PMHx of chronic respiratory failure on 6-8L NC home O2 at baseline, Pulmonary HTN (last RHC in 2022 with mPAP= 37mmHg, repeat RHC this admission), CKD IIIA, CAD s/p CANDELARIA x3 to the gwtjagxj-ju-ovmazt LAD (9/2017), longstanding persistent AF s/p multiple electrical cardioversions and extensive catheter ablations x2 in 2011 (on coumadin), ischemic cardiomyopathy s/p Medtronic dual-chamber PPM in 1999 replaced with a Medtronic  dual-chamber implantable cardiac defibrillator on 6/11/2014, MDD, HLD who was admitted on 3/8/2024 with acute on chronic RS failure and acute renal failure with concerns for possible over diuresis who was transferred for a RHC. Patient notes he has been having increased SOB with increased aggressive outpatient diuresis, he had a worsening creatinine causing him to present to the ED. He received 500cc of IV fluids in the ED. His home diuretics were held due to his CHRISSY. As patient had increased oxygen requirements to 70% fio2 on 50LPM pulm saw him and recommended  transfer to the [facility] for RHC.  The patient does admit to having smoked earlier in his life, however he hasn't smoked in many years. 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 No other significant exposures.         Review of Systems:   CONSTITUTIONAL:  No fevers or chills  EYES: negative for icterus  ENT:  negative for hearing loss, tinnitus, complains of sore throat after HFNC was started  RESPIRATORY:  cough with sputum present, and dyspnea present and requiring O2 at rest  CARDIOVASCULAR:  negative for chest pain, dyspnea present  GASTROINTESTINAL:  negative for nausea, vomiting, diarrhea and constipation  GENITOURINARY:  negative for dysuria  HEME:  Easy bruising  INTEGUMENT:  negative for rash and pruritus  NEURO:  Negative for headache         Past Medical History:     Past Medical History:   Diagnosis Date    Anemia     Asthma without status asthmaticus 05/05/2021    Atrial fibrillation (H)     BPH (benign prostatic hyperplasia)     Cardiomyopathy (H)     CKD (chronic kidney disease) stage 3, GFR 30-59 ml/min (H) 05/24/2023    Congestive heart failure (H)     COPD, group B, by GOLD 2017 classification (H)     Coronary artery disease due to calcified coronary lesion     Dyslipidemia, goal LDL below 70     Essential hypertension     Heart failure with reduced ejection fraction (H) 08/17/2023    Hemoptysis 10/04/2017    History of transfusion     Hyperlipidemia     Persistent atrial fibrillation (H)     Pneumonia of left lower lobe due to infectious organism 10/04/2017    Pulmonary hypertension (H)     Skin cancer of trunk     Status post catheter ablation of atrial fibrillation 06/07/2017    PVI 4-2011 (Cryo/PVI + roof line + CTI line) Re-do PVI 7-2011 (RFA/PVI + CFE + VIDYA + confirmed CTI line)    Ventricular tachycardia (H)             Past Surgical History:     Past  Surgical History:   Procedure Laterality Date    CARDIAC DEFIBRILLATOR PLACEMENT      CARDIOVERSION  07/11/2018    x20, last 2/12/15, 10/2015, 11/18/16, 6/16/17 by Lauren Foster CNP    CARDIOVERSION  07/11/2018    CARDIOVERSION  11/19/2021    COLONOSCOPY N/A 04/28/2017    Procedure: COLONOSCOPY with 2 ascending polyps and 1 transverse polyp;  Surgeon: Jose Whittington MD;  [facility];  Service:     CORONARY ANGIOGRAPHY ADULT ORDER      CV CORONARY ANGIOGRAM N/A 09/20/2017    Procedure: Coronary Angiogram;  Surgeon: Sergio Cervantes MD;  Location: [facility] Cath Lab;  Service:     CV CORONARY ANGIOGRAM N/A 01/24/2022    Procedure: Coronary Angiogram;  Surgeon: Christi Saunders MD;  Location: [facility] LAB CV    CV LEFT HEART CATH N/A 01/24/2022    Procedure: Left Heart Cath;  Surgeon: Christi Saunders MD;  Location: [facility] LAB CV    CV RIGHT AND LEFT HEART CATH N/A 01/24/2022    Procedure: Right and Left Heart Catherization;  Surgeon: Christi Saunders MD;  Location: [facility] LAB CV    CV RIGHT HEART CATH MEASUREMENTS RECORDED N/A 3/12/2024    Procedure: Heart Cath Right Heart Cath;  Surgeon: Edgardo Deluna MD;  Location:  HEART CARDIAC CATH LAB    EP ICD GENERATOR REPLACEMENT DUAL N/A 10/28/2022    Procedure: Implantable Cardioverter Defibrillator Generator Replacement Dual;  Surgeon: Elo Collins MD;  Location: [facility] CATH LAB CV    EP ICD INSERT      FRACTURE SURGERY Left     wrist    HEART CATH, ANGIOPLASTY      IMPLANT AUTOMATIC IMPLANTABLE CARDIOVERTER DEFIBRILLATOR      INGUINAL HERNIA REPAIR Left 1967    while in the [employer] after 13 month in [place]    INSERT / REPLACE / REMOVE PACEMAKER      IR MISCELLANEOUS PROCEDURE  04/30/2014    OTHER SURGICAL HISTORY      left hand surgery---tendon repair    SC ABLATE HEART DYSRHYTHM FOCUS  04/2011    Catheter Ablation Atrial Fibrillation PVI Apr 2011 (Cryo+RF-PVI + roof line + CTI line)    SC ABLATE  HEART DYSRHYTHM FOCUS  2011    Re-do PVI 2011 (RFA-PVI + CFE + VIDYA + confirmation of CTI line)    TOTAL SHOULDER REPLACEMENT Right 2016    Dr. Abernathy of [facility]    WRIST SURGERY Left     ZZC MYERS W/O FACETEC FORAMOT/PURNIMAKC  VRT SEG, CERVICAL      Laminectomy Lumbar;  Recorded: 2012;            Family History:   Reviewed and non-contributory.   Family History   Problem Relation Age of Onset    Cancer Mother         leukemia    Cancer Father         bladder    Cancer Sister         breast with lung met.    Aneurysm Sister     CABG Brother     CABG Brother     Valvular heart disease Brother         valve replacement            Social History:     Social History     Tobacco Use    Smoking status: Former     Packs/day: 1.00     Years: 4.00     Additional pack years: 0.00     Total pack years: 4.00     Types: Cigarettes     Quit date: 1968     Years since quittin.2    Smokeless tobacco: Never   Substance Use Topics    Alcohol use: Yes     Alcohol/week: 2.0 standard drinks of alcohol     Comment: Alcoholic Drinks/day: 1 beer per week     History   Sexual Activity    Sexual activity: Yes    Partners: Female    Birth control/ protection: Post-menopausal            Current Medications:      acetylcysteine  2 mL Nebulization Q4H    And    albuterol  2.5 mg Nebulization Q4H    aspirin  81 mg Oral Daily    atorvastatin  40 mg Oral QPM    bumetanide  0.5 mg Oral BID    empagliflozin  10 mg Oral Daily    FLUoxetine  20 mg Oral Daily    fluticasone-vilanterol  1 puff Inhalation Daily    [START ON 3/20/2024] metoprolol succinate ER  12.5 mg Oral Daily    nystatin  500,000 Units Swish & Swallow 4x Daily    pantoprazole  40 mg Oral BID AC    polyethylene glycol  17 g Oral BID    predniSONE  60 mg Oral Daily    sodium chloride (PF)  3 mL Intracatheter Q8H    thiamine  100 mg Oral Daily    umeclidinium  1 puff Inhalation Daily    vitamin C  1,000 mg Oral Daily    vitamin D2  50,000 Units  Oral Once per day on Monday Thursday    Warfarin Therapy Reminder  1 each Oral See Admin Instructions    warfarin-No DOSE today  1 each Does not apply no dose today (warfarin)            Allergies:     Allergies   Allergen Reactions    Adhesive Tape Other (See Comments)     ADHESIVE TAPE; SKIN IRRITATION; Skin pulled off with foam tape      Amiodarone      ADVERSE REACTION.  Sunlight sensitivity.    Lisinopril             Physical Exam:   Vitals were reviewed  Patient Vitals for the past 24 hrs:   BP Temp Temp src Pulse Resp SpO2 Weight   03/19/24 1539 99/67 97.4  F (36.3  C) Oral 83 22 99 % --   03/19/24 1300 -- -- -- -- -- 94 % --   03/19/24 1102 93/69 97.4  F (36.3  C) Oral 85 24 94 % --   03/19/24 1042 -- -- -- -- -- -- 103.1 kg (227 lb 6.4 oz)   03/19/24 0841 -- -- -- -- -- 92 % --   03/19/24 0819 -- -- -- -- -- (!) 89 % --   03/19/24 0805 -- -- -- -- -- (!) 87 % --   03/19/24 0800 -- -- -- -- -- (!) 86 % --   03/19/24 0753 106/76 (P) 97.5  F (36.4  C) (P) Oral (P) 78 (P) 24 (P) 93 % --   03/19/24 0749 -- -- -- -- -- 93 % --   03/19/24 0451 94/61 97.9  F (36.6  C) Oral -- 23 99 % --   03/19/24 0035 95/59 97.4  F (36.3  C) Oral -- 22 97 % --   03/18/24 2230 -- -- -- -- -- 96 % --   03/18/24 2052 -- -- -- -- -- 94 % --   03/18/24 1940 115/87 97.6  F (36.4  C) Oral 90 18 95 % --       Physical Examination:  GENERAL:  well-developed, well-nourished, in bed in no acute distress.   HEENT:  Head is normocephalic, atraumatic   EYES:  Eyes have anicteric sclerae without conjunctival injection or stigmata of endocarditis.    ENT:  Oropharynx is moist without exudates or ulcers. Tongue is midline  NECK:  Supple. No cervical lymphadenopathy  LUNGS:  Clear to auscultation bilateral.   CARDIOVASCULAR:  Regular rate and rhythm with no murmurs, gallops or rubs.  ABDOMEN:  Normal bowel sounds, soft, nontender. No appreciable hepatosplenomegaly  SKIN:  No acute rashes.  Line(s) are in place without any surrounding erythema or  "exudate. No stigmata of endocarditis.  NEUROLOGIC:  Grossly nonfocal. Active x4 extremities         Laboratory Data:     Inflammatory Markers    Recent Labs   Lab Test 03/11/24  1028 02/15/22  0941   SED 56*  --    CRP  --  <2.9       Hematology Studies    Recent Labs   Lab Test 03/19/24  0548 03/18/24  0413 03/17/24  0517 03/16/24  0601 03/15/24  0457 03/14/24  0622   WBC 11.2* 11.2* 11.9* 11.4* 9.4 11.9*   HGB 9.7* 9.8* 9.2* 10.2* 10.4* 10.5*   * 101* 98 98 99 100   * 160 157 188 175 165       Metabolic Studies     Recent Labs   Lab Test 03/19/24  0548 03/18/24  0413 03/17/24  0517 03/16/24  0601 03/15/24  2047 03/15/24  1044 03/15/24  0457    139 139 138  --   --  138   POTASSIUM 4.6 4.5 4.4 4.3 4.2   < > 3.2*   CHLORIDE 105 103 102 100  --   --  98   CO2 26 27 25 25  --   --  26   BUN 50.1* 54.8* 60.8* 68.6*  --   --  74.6*   CR 1.65* 1.78* 1.83* 1.92*  --   --  1.92*   GFRESTIMATED 42* 38* 37* 35*  --   --  35*    < > = values in this interval not displayed.       Hepatic Studies    Recent Labs   Lab Test 03/13/24  0644 03/11/24  0617 03/09/24  2315 05/20/23  0427 05/19/23  0446 05/18/23  0855 02/15/22  0941   BILITOTAL 1.2 1.8* 1.7*  --  1.0 1.0 0.5   ALKPHOS 61 66 77  --  56 69 56   ALBUMIN 3.4* 3.9 4.3 3.3* 3.3* 3.7 3.3*   AST 30 32 33  --  24 24 23   ALT 17 13 13  --  15 15 24       Microbiology:  All cultures:  Recent Labs   Lab 03/16/24  1622 03/16/24  1614   CULTURE No growth after 2 days No growth after 2 days        Respiratory Virus Testing    No results found for: \"RS\", \"FLUAG\"       CT CHEST (3/9):  IMPRESSION:   1.  Segments of cylindrical bronchiectasis with endobronchial mucosal thickening and secretions in the right middle lobe, lingula and both lower lobes as well as patchy airspace opacity in the adjacent lung parenchyma consistent with bronchopneumonia.   2.  Hyperinflation of the lungs and moderate mid and upper lung centrilobular emphysema.   3.  Moderate cardiomegaly " and severe three-vessel coronary artery calcification.  4.  Enlargement central pulmonary arteries (MPA diameter 4.6 cm) consistent with chronic pulmonary arterial hypertension.      NM LUNG SCAN PERFUSION PARTICULATE (3/11)  Impression: Low probability of pulmonary embolism based on this perfusion only study.    RHC (3/12):  PCWP: 6 mmHg       PA: 57/20/35 mmHg (s/d/m)      RV: 57/5 mmHg (s/ed)      RA: --/17/11 mmHg      CO 4.15 L/min (thermodilution); 4.51 L/min (Shikha)      CI: 1.83 L/min/m2 (TD); 1.98 L/min/m2 (Shikha)  PVR: 6.9 (TD) Wood Units   Right sided filling pressures are mildly elevated. Left sided filling pressures are normal. Moderately elevated pulmonary artery hypertension. Reduced cardiac output level.     CXR (3/16):  IMPRESSION: Cardiomegaly with bibasilar presumed subsegmental atelectasis versus interstitial edema. No radiographic evidence for lobar collapse or mucous plugging.    FELLOW ATTESTATION  I was present with the medical student, who participated in the service and in the documentation of the note.  I have verified the history and personally performed the physical exam and medical decision making.  I agree with the assessment and plan of care as documented in the note.

## 2024-03-19 NOTE — PROGRESS NOTES
CLINICAL NUTRITION SERVICES - ASSESSMENT NOTE     Nutrition Prescription    Malnutrition Status:    Does not meet malnutrition criteria at this time    Recommendations already ordered by Registered Dietitian (RD):  10 AM - Ensure Plus, chocolate flavor (350 Kcals, 20 gm protein)  Dinner trays - Magic Cup, rotate flavors (290 Kcals, 9 gm protein)  Discussed pt current diet order, provided list of condiment options per request (recommended not to add salt due to hx HF, although not currently on restriction for this), encouraged PO intake and ok for whole milk if requests milk on trays.    Future/Additional Recommendations:  Monitor nutrition-related findings and follow pt per protocol     REASON FOR ASSESSMENT  Buck Sinclair is a/an 79 year old male assessed by the dietitian for LOS    CLINICAL HISTORY  Hx of CKD III, CAD, dyslipidemia, CKD II, COPD, cardiomyopathy, HTN, and HFrEF, admitted to [facility] due to increased O2 requirements.     NUTRITION HISTORY  Good appetite/intake reported, although has been eating less his usual baseline while in hospital due to being on a modified texture diet. Found the IDDSI level 5 minced/moist to be more challenging, was recently advanced to soft/bite sized per SLP. Wife has been helping pt place meal orders. Foods often bland per pt, requested condiment list which RD provided. Also reviewed current supplement order per MD, modified flavor/timing per pt preference and added Magic Cup with evening trays.     CURRENT NUTRITION ORDERS  Diet: Level 6: Soft & Bite-Sized Dysphagia Diet   Supplements: Ensure Plus, between meals    Intake/Tolerance: % intake documented for most meals, per I/O since admission. Per review of room service selections, pt recently ordering 3 meals daily, however was ordering ~2 meals daily early in admission. Ensure supplements started by MD on 3/16.     GI  Daily BMs, 1-2 unmeasured occurrences since 3/15 per I/O     LABS:  Reviewed   "    MEDICATIONS  Bumex BID  Miralax BID   Thiamine 100 mg daily  Vit D2, 1250 mcg Monday/Thursday  Warfarin    SKIN  WOCN not following pt     ANTHROPOMETRICS  Height: 190.5 cm (6' 3\")  Admit wt 97.7 kg (215 lbs) 3/11/24  Most Recent Weight: 102.5 kg (225 lb 15.5 oz)  IBW: 89.1 kg  110%IBW   BMI: Overweight BMI 25-29.9    Weight History:   - Using admit wt, pt down 14.2 kg (12.7%) over past 2-3 week period. Weight from 3/1/24 is consistent with prior measure in November 2023. Overall weight is down ~21.1% since November 2022.   - Addressed large weight loss per wt hx during visit. Wife confident that this is \"90% water weight\". Pt with good strength per physical exam; no signs of muscle/fat depletion outside of what would be expected with advanced age. Not counting weight loss as criteria for malnutrition at this time.   Wt Readings from Last 15 Encounters:   03/18/24 102.5 kg (225 lb 15.5 oz)   03/09/24 97.3 kg (214 lb 6.4 oz)   03/01/24 111.5 kg (245 lb 12.8 oz)   11/14/23 111.2 kg (245 lb 3.2 oz)   09/28/23 112.5 kg (248 lb)   08/17/23 115.2 kg (254 lb)   06/15/23 115.8 kg (255 lb 3.2 oz)   06/13/23 114.8 kg (253 lb)   06/01/23 110.1 kg (242 lb 11.2 oz)   11/16/22 123.3 kg (271 lb 14.4 oz)   11/08/22 118.8 kg (262 lb)   10/28/22 118.8 kg (262 lb)   05/17/22 126.6 kg (279 lb)   05/06/22 127.6 kg (281 lb 4.8 oz)   03/15/22 123.7 kg (272 lb 9.6 oz)     Dosing Weight: 97.7 kg admit wt    ASSESSED NUTRITION NEEDS  Estimated Energy Needs: 2960-0527 kcals/day (25 - 30 kcals/kg)  Justification: Maintenance  Estimated Protein Needs:  grams protein/day (0.8 - 1 grams of pro/kg)  Justification: Increased needs but lower end of range w/ CKD III hx  Estimated Fluid Needs: 2440 mL (25 mL/kg) or per MD pending pt fluid status  Justification: Per provider pending fluid status    PHYSICAL FINDINGS  See malnutrition section below.      MALNUTRITION  % Intake: Decreased intake does not meet criteria  % Weight Loss: Unable to " assess -confounded by fluid status   Subcutaneous Fat Loss: None observed  Muscle Loss: None observed -Good tone for age. Possible depletion in upper arm but likely attributed to age.  Fluid Accumulation/Edema: None noted  Malnutrition Diagnosis: Patient does not meet two of the established criteria necessary for diagnosing malnutrition    NUTRITION DIAGNOSIS  Predicted inadequate nutrient intake (energy/protein) related to prolonged LOS, potential for menu fatigue and/or inadequate intake w/ dysphagia as evidenced by reliant on PO diet to meet 100% of needs, oral nutrition supplements to optimize total nutrient intake.       INTERVENTIONS  Implementation  Nutrition education for recommended modifications, nutrient goals, reason for assessment/visit  Medical food supplement therapy - Scheduled + list provided   Modify composition of meals/snacks - condiment list provided upon request    Goals  Patient to consume % of nutritionally adequate meal trays TID, or the equivalent with supplements/snacks.     Monitoring/Evaluation  Progress toward goals will be monitored and evaluated per protocol.  Jr Johnson, MS, RDN, LD, CNSC  Available by [employer] or phone   [employer]ocmine: M-F (7:00-3:30)  6B Clinical Dietitian  or 2 Obs Clinical Dietitian  Weekend/Holiday (7:00-3:30) - Weekend Clinical Dietitian   6B RD desk: [phone]       **Clinical Dietitians will no longer be responding to pages. [place] only please.

## 2024-03-19 NOTE — PROGRESS NOTES
[facility]    Medicine Progress Note - Hospitalist Service, GOLD TEAM 7    Date of Admission:  3/9/2024      Assessment & Plan   Mr. Buck Sinclair is a 78 yo male with hx of depression, HLD, HTN, CAD s/p CANDELARIA placement (9/2017), CKDIII, afib s/p cardioversions and ablations in 2011 (on warfarin PTA), ICM s/p initial PPM placement in 1999 followed by ICD placement (6/2014), and chronic hypoxic respiratory failure with BL supplemental O2 requirement of 6-8L 2/2 CPFE and group III pulm HTN admitted initially to [facility] 3/8-3/9 for acute on chronic hypoxic respiratory failure and CHRISSY, transferred to Cards II service ICU at [facility] for higher level of care 3/9, transferred to medicine 3/15.     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 Home inhaler regimen = symbicort, tiotropium, albuterol  - burst IV methylprednisolone 100 mg daily x 5 days (3/14-3/18), taper to 60 mg daily  - Once daily Breo ellipta and umeclidium (getting LABA, LAMA, ICS);   - NAC and albuterol q4h  - placed on BiPAP overnight, with FiO2 between 40-60%  - wean HFNC to baseline 6-8 Lpm via NC as able to keep sats between 88-92%;  - Continue Levaquin 500 mg po daily to complete 7 day course today  (3/19)  - Pulmonary not inclined to prescribe PJP ppx because unlikely pt will need ongoing steroids    CV  1) Ischemic Cardiomyopathy with recovered EF (55-60%) and moderately reduced RV function  2) Chronic, persistent afib, on warfarin (INR goal 2-3)  3) Benign Essential (primary) HTN  4) CAD s/p CANDELARIA placement (9/2017)  5) Dyslipidemia   Home meds:  Toprol XL 25 mg daily, Bumex 2 mg BID, 81 mg aspirin, warfarin, empagliflozin, atorvastatin 40  - Bumex 0.5 mg BID  - pharmacy dosing warfarin,   - atorvastatin 3/16  - Metoprolol 25 mg PO daily PRN    Lactic acidosis 3/16, r/o sepsis; suspect type B due to albuterol  -  lactic acidosis is due to 1) scheduled albuterol, 2) reduced clearance/metabolism from kidney disease -- would expect lower lactic acid levels early in the day, higher in the evening  -  continue to monitor    CHRISSY on CKD3a due to overdiuresis/prerenal etiology, improving without complete recovery: baseline Cr 1.5-1.7. Peaked to 3.03 on 3/8, trended down to 1.65 today.  - strict I/O, daily weights,   - Bumex 0.5 mg BID     Moderate acute on chronic oropharyngeal dysphagia: Speech following video swallow performed  - Continue minced and moist textues (5) with thin liquid (0) diet; added Ensure 3/16   - patient pretty unhappy with the diet, SLP to continue working with him to advance    DM2: A1C 6.9%, on monotherapy with empagliflozin PTA; stop BG checks as patient's BG's have been within goal for entirety of hospital stay (140-180)  - PTA empagliflozin 10 mg    Oral thrush: Continue nystatin 139461 units QID x 10 days total    Major Depressive Disorder: home fluoxetine 20 mg daily          Diet: Snacks/Supplements Adult: Ensure Enlive; Between Meals  Soft & Bite Sized Diet (level 6) Thin Liquids (level 0)    DVT Prophylaxis: Warfarin  Sawyer Catheter: Not present  Lines: None     Cardiac Monitoring: None  Code Status: No CPR- Do NOT Intubate      Kiet Hughes  Hospitalist Service, GOLD TEAM 7  [facility]  Securely message with [email or web address] (more info)  Text page via [employer] Paging/Directory   See signed in provider for up to date coverage information          Physician Attestation   I, Elana Garcia MD, was present with the medical/OZ student who participated in the service and in the documentation of the  note.  I have verified the history and personally performed the physical exam and medical decision making.  I agree with the assessment and plan of care as documented in the note.      Key findings:     Please see A&P for additional details of medical decision making.    I have personally reviewed the following data over the past 24 hrs:    11.2 (H)  \   9.7 (L)   / 138 (L)     139 105 50.1 (H) /  114 (H)   4.6 26 1.65 (H) \     Procal: N/A CRP: <3.00 Lactic Acid: N/A       INR:  3.22 (H) PTT:  N/A   D-dimer:  N/A Fibrinogen:  N/A       - Respiratory status is stable to improving, considered spironolactone but will hold off in the setting of CKD.   Elana Garcia MD  Date of Service (when I saw the patient): 03/19/24 _  _____________________________________________________________________    Interval History   Pt was sating >96% on BiPAP 40% (titrated from 60%) overnight. During the day, HFNC 50L 80% (titrated from 100%).  Slept well on BiPAP although not thrilled about using it. Still short of breath with activity during the day.    Physical Exam   Vital Signs: Temp: 97.9  F (36.6  C) Temp src: Oral BP: 106/76 Pulse: 90   Resp: 23 SpO2: 93 % O2 Device: High Flow Nasal Cannula (HFNC) Oxygen Delivery: 50 LPM  Weight: 225 lbs 15.54 oz    Sitting on a chair in no acute distress  Awake, alert, answers questions and follows commands  Respiratory rate normal, work of breathing minimally elevated with some belly breathing  Extremities are with mild edema     MDM: high  See problem list above  Reviewed CBC, renal panel, Mg, lactic acids, INR, overnight vitals while on BiPAP   high risk due to continued need for HFNC and BiPAP to maintain SpO2

## 2024-03-20 NOTE — PLAN OF CARE
Neuro: A&Ox4. [place], hearing aides utilized. pt not impulsive.  Cardiac: No tele. Soft BPs, 90s/60s.   Respiratory: able to wean down to 60% FiO2 when pt is at rest. Pt needs 70-80% FiO2 when ambulating or having significant exertion. Pt states no SOB. Pt doing 2000ml for incentive spirometer.  GI/: marginal UOP- clear florinda urine this shift. BM this shift  Diet/appetite: Soft, bite sized diet. Eating well.  Activity:  Assist of 1, up to chair. Up in chair all day.  Pain: Denies.   Skin: No new deficits noted.   LDA's: R PIV, x2.      Plan: wean FiO2.

## 2024-03-20 NOTE — PLAN OF CARE
"Shift: 7-1930   /75 (BP Location: Left arm)   Pulse 89   Temp 97.4  F (36.3  C) (Oral)   Resp 19   Ht 1.905 m (6' 3\")   Wt 104 kg (229 lb 4.5 oz)   SpO2 92%   BMI 28.66 kg/m      V/S & pain: VSS, pt denies pain this shift   Neuro: A/O x4, calm and cooperative   Respiratory: stable on 55FiO2/ 55LPM with SpO2 ranging 89-94%, lung sounds clear/ diminished.bilaterally, continuous pulse ox monitoring to the desk  Cardiac: WDL. Permanent pacemaker AICD/ICD   Skin: no new skin concerns present. +1 dependent edema to BLLE (diuretics increased this shift)   GI/: voiding spontaneously, BM X1  Nutrition: soft and bite sized with thin liquid diet w/ good appetite and adequate po intake, denies N/V  Lines/drains: 2. R. PIV's saline locked   Activity: Up in chair most of day; with ambulating to commode. Up in hallway with PT. Dyspnea on exertion   Labs: monitoring RN managed Mg, K and Phos. Recheck 3/21     Events this shift: Patient ambulated in hallway on Bipap and tolerated well. no acute events this shift. Pt remains vitally stable with call light w/in reach and able to make needs know.     "

## 2024-03-20 NOTE — PROGRESS NOTES
[facility]    Medicine Progress Note - Hospitalist Service, GOLD TEAM 7    Date of Admission:  3/9/2024      Assessment & Plan   Mr. Buck Sinclair is a 80 yo male with hx of depression, HLD, HTN, CAD s/p CANDELARIA placement (9/2017), CKDIII, afib s/p cardioversions and ablations in 2011 (on warfarin PTA), ICM s/p initial PPM placement in 1999 followed by ICD placement (6/2014), and chronic hypoxic respiratory failure with BL supplemental O2 requirement of 6-8L 2/2 CPFE and group III pulm HTN admitted initially to [facility] 3/8-3/9 for acute on chronic hypoxic respiratory failure and CHRISSY, transferred to Cards II service ICU at [facility] for higher level of care 3/9, transferred to medicine 3/15. FiO2 improving with sats >90% on on BiPAP 40-50% this AM (titrated from 60%). Continue to wean pt off to baseline 6-8 Lpm for sats between 88-92%.    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 Home inhaler regimen = symbicort, tiotropium, albuterol. Oxygen requirements are stable to slightly improving. - Levaquin 500 mg po daily completed 7 day course on 3/19.   - Continue oral prednisone 60 mg daily  - Once daily Breo ellipta and umeclidium (getting LABA, LAMA, ICS);   - Mucomyst and albuterol q4h  - wean HFNC to baseline 6-8 Lpm via NC as able to keep sats between 88-92%;    CV  1) Ischemic Cardiomyopathy with recovered EF (55-60%) and moderately reduced RV function  2) Chronic, persistent afib, on warfarin (INR goal 2-3)  3) Benign Essential (primary) HTN  4) CAD s/p CANDELARIA placement (9/2017)  5) Dyslipidemia   Home meds: Toprol XL  25 mg daily, Bumex 2 mg BID, 81 mg aspirin, warfarin, empagliflozin, atorvastatin 40. BNP was elevated 5,323 down from 6537 yesterday. ECHO on 3/9 showed severe right ventricular dilation. RHC on 3/9 found mildly elevated right sided filling pressure and moderately elevated pulmonary artery hypertension.   - Bumex 0.5 mg BID.   - pharmacy dosing warfarin,   - atorvastatin 3/16  - Continue Metoprolol 12.5 mg PO daily  - Start Acetazolamide 250 mg daily  - Target one liter net negative daily for the next two days    Resolved problems:  Lactic acidosis 3/16, r/o sepsis; suspect type B due to albuterol  CHRISSY on CKD3a due to overdiuresis/prerenal etiology,   Oral thrush: Continue nystatin 890163 units QID x 10 days total    Chronic problems:  Moderate acute on chronic oropharyngeal dysphagia: Speech following video swallow performed  - Continue minced and moist textues (5) with thin liquid (0) diet; added Ensure 3/16   - patient pretty unhappy with the diet, SLP to continue working with him to advance    DM2: A1C 6.9%, on monotherapy with empagliflozin PTA; stop BG checks as patient's BG's have been within goal for entirety of hospital stay (140-180)  - PTA empagliflozin 10 mg      Major Depressive Disorder: home fluoxetine 20 mg daily          Diet: Soft & Bite Sized Diet (level 6) Thin Liquids (level 0)  Snacks/Supplements Adult: Ensure Enlive; Between Meals  Room Service    DVT Prophylaxis: Warfarin  Sawyer Catheter: Not present  Lines: None     Cardiac Monitoring: None  Code Status: No CPR- Do NOT Intubate      Kiet Hughes  Hospitalist Service, [facility] TEAM 7  [facility]  Securely message with [employer] (more info)  Text page via [facility] Paging/Directory   See signed in provider for up to date coverage information              Physician Attestation   I, Elana Garcia MD, was present with the medical/OZ student who participated in the service and in the documentation of the  note.  I have verified the history and personally performed the physical exam and medical decision making.  I agree with the assessment and plan of care as documented in the note.      Key findings:     Please see A&P for additional details of medical decision making.    I have personally reviewed the following data over the past 24 hrs:    14.6 (H)  \   9.3 (L)   / 128 (L)     137 104 50.3 (H) /  119 (H)   4.8 23 1.66 (H) \     Trop: N/A BNP: 5,323 (H)     INR:  2.93 (H) PTT:  N/A   D-dimer:  N/A Fibrinogen:  N/A         Elana Garcia MD  Date of Service (when I saw the patient): 03/20/24   _____________________________________________________________________    Interval History   Pt was weaned down to 60% FiO2 at rest but needed 70-80% when ambulating.     Physical Exam   Vital Signs: Temp: 97.6  F (36.4  C) Temp src: Axillary BP: 110/80 Pulse: 83   Resp: 24 SpO2: 95 % O2 Device: BiPAP/CPAP Oxygen Delivery: 50 LPM  Weight: 229 lbs 4.45 oz    Sitting on a chair in no acute distress, eating breakfast  Awake, alert, answers questions and follows commands  Respiratory rate normal, work of breathing minimally elevated with some belly breathing  Extremities with mild edema     MDM: high  See problem list above  Reviewed CBC, renal panel, Mg, lactic acids, INR, overnight vitals while on BiPAP   high risk due to continued need for HFNC and BiPAP to maintain SpO2

## 2024-03-20 NOTE — PLAN OF CARE
"BP 93/71 (BP Location: Right arm)   Pulse 83   Temp 97.5  F (36.4  C) (Axillary)   Resp 23   Ht 1.905 m (6' 3\")   Wt 104 kg (229 lb 4.5 oz)   SpO2 96%   BMI 28.66 kg/m       Neuro: A&Ox4. [place], hearing aides utilized.   Cardiac: No tele. VSS  Respiratory: Sating >90% on BiPAP 40-50% (titrated from 60%) overnight. During the day, HFNC 50L 55-80%. SpO2 goal 88-92%.   GI/: Adequate urine output. No BM this shift.   Diet/appetite: Soft, bite sized.   Activity:  Assist of 1, up to chair and in halls.  Pain: Denies.   Skin: No new deficits noted.   LDA's: R PIV, x2.      Plan: Continue with POC. Titrate FiO2 to keep SpO2 88-92%.   "

## 2024-03-21 NOTE — PLAN OF CARE
"Highlights/changes today:   Up to chair most of shift.   Worked with therapy, walked hallways with Bipap on.   Chest xray ordered and completed today.     /67 (BP Location: Left arm)   Pulse 86   Temp 97.6  F (36.4  C) (Oral)   Resp 22   Ht 1.905 m (6' 3\")   Wt 103.8 kg (228 lb 13.4 oz)   SpO2 93%   BMI 28.60 kg/m      Neuro: A&Ox4. Calm, cooperative, and pleasant. Pt is super hard of hearing, able to make needs known with hearing aids are in. SR, HR 60-80's. Afebrile and VSS.  Respiratory: Sating in the 90's on High flow NC 55% on 50 L, overnight pt is on Bipap.   GI/: Urinal bedside with adequate urine output and no BM during shift.   Diet/appetite: Tolerating soft & bite sized diet with thin liquids, good appetite.   Activity:  Assist x 1 with walker and gait belt, up to chair. Worked with therapy this am, walked hallway with Bipap on.   Pain: Denies.    Skin: No new deficits noted.  LDA's: 2 R PIV, SL.     Plan: Continue with POC. Notify primary team with changes.    "

## 2024-03-21 NOTE — PROGRESS NOTES
[facility]    Medicine Progress Note - Hospitalist Service, GOLD TEAM 7    Date of Admission:  3/9/2024      Assessment & Plan   Mr. Buck Sinclair is a 78 yo male with hx of depression, HLD, HTN, CAD s/p CANDELARIA placement (9/2017), CKDIII, afib s/p cardioversions and ablations in 2011 (on warfarin PTA), ICM s/p initial PPM placement in 1999 followed by ICD placement (6/2014), and chronic hypoxic respiratory failure with BL supplemental O2 requirement of 6-8L 2/2 CPFE and group III pulm HTN admitted initially to [facility] 3/8-3/9 for acute on chronic hypoxic respiratory failure and CHRISSY, transferred to Cards II service ICU at [facility] for higher level of care 3/9, transferred to medicine 3/15. FiO2 improving with sats >90% on on BiPAP 40-50% this AM (titrated from 60%). Continue to wean pt off to baseline 6-8 Lpm for sats between 88-92%.    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 Home inhaler regimen = symbicort, tiotropium, albuterol. Oxygen requirements are stable to slightly improving. - Levaquin 500 mg po daily completed 7 day course on 3/19. Walked the hallway with PT yesterday 3/20 and had dyspnea on exertion. Hemoptysis today but CXR was unremarkable and showed improved opacities representing combination of chronic fibrosis and mild edema.  - Continue oral prednisone 60 mg daily  - Once daily Breo ellipta and umeclidium (getting LABA, LAMA, ICS);   - Mucomyst and albuterol q4h  - wean HFNC to baseline 6-8 Lpm via NC as able to keep sats between  88-92%;    CV  1) Ischemic Cardiomyopathy with recovered EF (55-60%) and moderately reduced RV function  2) Chronic, persistent afib, on warfarin (INR goal 2-3)  3) Benign Essential (primary) HTN  4) CAD s/p CANDELARIA placement (9/2017)  5) Dyslipidemia   Home meds: Toprol XL 25 mg daily, Bumex 2 mg BID, 81 mg aspirin, warfarin, empagliflozin, atorvastatin 40. BNP was elevated 5,323 3/20 down from 6537 on 3/19. ECHO on 3/9 showed severe right ventricular dilation. RHC on 3/9 found mildly elevated right sided filling pressure and moderately elevated pulmonary artery hypertension. Pt still with significant LE edema. Acetazolamide was added on 3/20 to increases diuresis. Oral intake yesterday 3/20 was 2.4L and output 1.6 L with net output of +0.8 L.   - Bumex 0.5 mg BID.   - pharmacy dosing warfarin,   - atorvastatin 3/16  - Continue Metoprolol 12.5 mg PO daily  - Start Acetazolamide 250 mg daily  - Target one liter net negative daily for the next two days    Resolved problems:  Lactic acidosis 3/16, r/o sepsis; suspect type B due to albuterol. Lactate at 2.8 today  CHRISSY on CKD3a due to overdiuresis/prerenal etiology,   Oral thrush: Continue nystatin [id number] units QID x 10 days total    Chronic problems:  Moderate acute on chronic oropharyngeal dysphagia: Speech following video swallow performed  - Continue minced and moist textues (5) with thin liquid (0) diet; added Ensure 3/16   - patient pretty unhappy with the diet, SLP to continue working with him to advance    DM2: A1C 6.9%, on monotherapy with empagliflozin PTA; stop BG checks as patient's BG's have been within goal for entirety of hospital stay (140-180)  - PTA empagliflozin 10 mg      Major Depressive Disorder: home fluoxetine 20 mg daily          Diet: Soft & Bite Sized Diet (level 6) Thin Liquids (level 0)  Snacks/Supplements Adult: Ensure Enlive; Between Meals  Room Service    DVT Prophylaxis: Warfarin  Sawyer Catheter: Not present  Lines: None     Cardiac  Monitoring: None  Code Status: No CPR- Do NOT Intubate      Kiet Veeu  Hospitalist Service, GOLD TEAM 7  [facility]  Securely message with [employer] [facility] (more info)  Text page via [employer] Paging/Directory   See signed in provider for up to date coverage information              Physician Attestation   I, Elana Garcia MD, was present with the medical/OZ student who participated in the service and in the documentation of the note.  I have verified the history and personally performed the physical exam and medical decision making.  I agree with the assessment and plan of care as documented in the note.      Key findings:     Please see A&P for additional details of medical decision making.    I have personally reviewed the following data over the past 24 hrs:    13.4 (H)  \   9.5 (L)   / 125 (L)     136 104 52.1 (H) /  144 (H)   4.4 23 1.78 (H) \     Procal: N/A CRP: N/A Lactic Acid: 2.8 (H)       INR:  2.34 (H) PTT:  N/A   D-dimer:  N/A Fibrinogen:  N/A       -Cystatin C reflecting much worse kidney function than creatinine based GFR which would explain his need for increased diuretics, will monitor moving forward using Cystatin C periodically  -Lactic acidosis is noted, chest x-ray without new infiltrates and blood cultures have been obtained.  Will repeat lactic acid at 8 PM, if stable then we will not intervene other than repeat lactic acid in a.m.  If increasing, then will utilize a small volume resuscitation using 5% albumin at 25 g and empirical Zosyn.     Elana Garcia MD  Date of Service (when I saw the patient): 03/21/24   _____________________________________________________________________    Interval History   Patient was sating >90 on HFNC 55% 50L; on BiPAP overnight 60%. Denied any pain. Walked the hallway with PT yesterday 3/20 and had dyspnea on exertion. Enjoyed getting up for a walk. States it was helpful.    Physical Exam   Vital Signs: Temp: 97.2   F (36.2  C) Temp src: Axillary BP: 94/54 Pulse: 95   Resp: 22 SpO2: 95 % O2 Device: BiPAP/CPAP Oxygen Delivery: 55 LPM  Weight: 228 lbs 13.4 oz    Sitting on a chair in no acute distress, eating breakfast  Awake, alert, answers questions and follows commands  Respiratory rate normal, mild crackles on auscultation, on 50 LPM HFNC with FiO2 of 55%.  Extremities with mild edema     MDM: high  See problem list above  Reviewed CBC, renal panel, Mg, lactic acids, INR, overnight vitals while on BiPAP   high risk due to continued need for HFNC and BiPAP to maintain SpO2

## 2024-03-21 NOTE — PLAN OF CARE
Neuro: A&Ox4. [employer], used bilateral hearing aids  Cardiac: No tele. VSS.   Respiratory: Sating >90 on HFNC 55% 50L; on BiPAP @ night 60%.  GI/: Adequate urine output. BM X1  Diet/appetite: Tolerating soft and bite sized diet. Eating well.  Activity:  Assist of 1, up to chair and in halls.  Pain: No pain reported by patient.   Skin: No new deficits noted. Scattered bruises on BUE, BLE.   LDA's: PIV x2, saline locked    Plan: Continue with POC. Notify primary team with changes.

## 2024-03-21 NOTE — PROGRESS NOTES
PULMONARY CONSULTATION: Progress Note     Patient:  Buck Sinclair   Date of birth 1945, Medical record number [id number]  Date of Visit:  03/21/2024  Date of Admission: 3/9/2024  Consult Requester: Velia Cabrera MD  Reason for Consult: To assess COPD severity          Assessment and Recommendations:     ASSESSMENT:  Mr. Buck Sinclair is a 79 y.o. male patient with PMHx of chronic respiratory failure on 6-8L NC home O2 at baseline, Pulmonary HTN (last RHC in 2022 with mPAP= 37mmHg, repeat RHC this admission), CKD IIIA, CAD s/p CANDELARIA x3 to the [id number] LAD (9/2017), longstanding persistent AF s/p multiple electrical cardioversions and extensive catheter ablations x2 in 2011 (on coumadin), ischemic cardiomyopathy s/p Medtronic dual-chamber PPM in 1999 replaced with a Medtronic dual-chamber implantable cardiac defibrillator on 6/11/2014, MDD, HLD. He was admitted on 3/8/2024 with acute on chronic RS failure and acute renal failure with concerns for possible over diuresis and was transferred to [facility] for a RHC. He has been on home oxygen since 4 years, initially on 2-4L NC, but slowly progressed to a baseline of 6-8L NC. Currently he requires HFNC. The concern initially was for Interstitial Pneumonia with Autoimmune Features (IPAF). He was started on steroid pulse therapy. However given lack of subjective improvement, now tapering the steroids. Planning to wean him off oxygen, back to his baseline.    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 Per his PFTs dated 10/7/22, FEV1/FVC is 61%, significant for obstructive pathology. CT (3/9) shows hyperinflation of the lungs and moderate mid and upper lung centrilobular emphysema, with NSIP pattern as well. Overall his COPD seems to have remained the same and is not progressing. The reasons for his current exacerbation may be PE, infections, volume overload, and worsening PHTN. Pulmonary embolism has been ruled out by NM VQ scan. PHTN per RHC on 3/12/2024 (mPAP=35) is stable as compared to that on 1/24/2022 (mPAP=37). He was also euvolemic on exam, and the concern previously was for overdiuresis and not volume overload. We sent a complete viral panel and sputum cultures on him. Viral panel negative. However, sputum growing 3+ Staph aureus, sensitivities awaited. Most likely commensal. MRSA swab negative. However, CRP and ESR are elevated, pointing more towards infectious vs inflammatory/autoimmune etiology for his current exacerbation.    Per the PFTs from 2022 he also has overall decreased FVC and also a decreased TLC, suggestive of restrictive pattern. This makes us think in terms of ILD and other restrictive pathologies. Given the pulmonary HTN, dysphagia/ esophageal dysmotility, sicca symptoms and NSIP pattern on CT chest, with restrictive pattern on PFTs, we were initially thinking in terms of Interstitial Pneumonia with Autoimmune Features (IPAF). We started him on pulsed steroids, however there was no significant improvement in O2 requirements.    No known history of autoimmune disease in him or his family. No obvious skin changes per our examination. No joint pains. Keeping systemic sclerosis under high suspicion, we sent a full ILD panel on him, but came back negative for ANAs, SS-A and B,  Edita-1, Scl-70, RA, and CCP. HSP panel negative. Anticentromere and RNP antibodies  negative. Overall this patient's condition does not seem autoimmune in nature. Per cardiology, there are no options for pulmonary vasodilator therapy in this patient.  Will try to manage him symptomatically and try to wean him off oxygen in the coming days.     Had some hemoptysis today. CXR stat ordered and it does not show any worsening, and in fact the opacities have reduced. Likely that the bleeding is from his throat or upper airway due to the suction tube itself. Would not recommend additional investigations at this time.    RECOMMENDATION:  Taper prednisone - 40 x 5 days, 20 x 5 days, 10 x 5 days, then off (ordered)  CXR stat given some hemoptysis  Wean and titrate FiO2 to goal SpO2 88-92%.   Continue incentive spirometry.    Thank you for this consult. Pulmonary will sign off.    Patient was discussed with Dr. Servando Gamble.     Sai BRISCOE  Medical Student  ________________________________________________________________    Consult Question: To assess COPD severity.  Admission Diagnosis: Pulmonary hypertension (H) [I27.20]         Interval History:   No overnight events. Patient feels comfortable. No worsening dyspnea. No fevers or chills. However he had some cough with hemoptysis, and showed us blood-tinged secretions in his suction tube. No chest pain.         History of Present Illness:     Mr. Buck Sinclair is a 79 y.o. male patient with PMHx of chronic respiratory failure on 6-8L NC home O2 at baseline, Pulmonary HTN (last RHC in 2022 with mPAP= 37mmHg, repeat RHC this admission), CKD IIIA, CAD s/p CANDELARIA x3 to the [id number] LAD (9/2017), longstanding persistent AF s/p multiple electrical cardioversions and extensive catheter ablations x2 in 2011 (on coumadin), ischemic cardiomyopathy s/p Medtronic dual-chamber PPM in 1999 replaced with a Medtronic  dual-chamber implantable cardiac defibrillator on 6/11/2014, MDD, HLD who was admitted on 3/8/2024 with acute on chronic RS failure and acute  renal failure with concerns for possible over diuresis who was transferred for a RHC. Patient notes he has been having increased SOB with increased aggressive outpatient diuresis, he had a worsening creatinine causing him to present to the ED. He received 500cc of IV fluids in the ED. His home diuretics were held due to his CHRISSY. As patient had increased oxygen requirements to 70% fio2 on 50LPM pulm saw him and recommended transfer to the [place] for RHC.  The patient does admit to having smoked earlier in his life, however he hasn't smoked in many years. 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 No other significant exposures.         Review of Systems:   CONSTITUTIONAL:  No fevers or chills  EYES: negative for icterus  ENT:  negative for hearing loss, tinnitus, complains of sore throat after HFNC was started  RESPIRATORY:  cough with sputum present, and dyspnea present and requiring O2 at rest  CARDIOVASCULAR:  negative for chest pain, dyspnea present  GASTROINTESTINAL:  negative for nausea, vomiting, diarrhea and constipation  GENITOURINARY:  negative for dysuria  HEME:  Easy bruising  INTEGUMENT:  negative for rash and pruritus  NEURO:  Negative for headache         Past Medical History:     Past Medical History:   Diagnosis Date    Anemia     Asthma without status asthmaticus 05/05/2021    Atrial fibrillation (H)     BPH (benign prostatic hyperplasia)     Cardiomyopathy (H)     CKD (chronic kidney disease) stage 3, GFR 30-59 ml/min (H) 05/24/2023    Congestive heart failure (H)     COPD, group B, by GOLD 2017 classification (H)     Coronary artery disease due to calcified coronary lesion     Dyslipidemia, goal LDL below 70     Essential hypertension     Heart failure with reduced ejection fraction (H) 08/17/2023    Hemoptysis 10/04/2017    History of transfusion      Hyperlipidemia     Persistent atrial fibrillation (H)     Pneumonia of left lower lobe due to infectious organism 10/04/2017    Pulmonary hypertension (H)     Skin cancer of trunk     Status post catheter ablation of atrial fibrillation 06/07/2017    PVI 4-2011 (Cryo/PVI + roof line + CTI line) Re-do PVI 7-2011 (RFA/PVI + CFE + VIDYA + confirmed CTI line)    Ventricular tachycardia (H)             Past Surgical History:     Past Surgical History:   Procedure Laterality Date    CARDIAC DEFIBRILLATOR PLACEMENT      CARDIOVERSION  07/11/2018    x20, last 2/12/15, 10/2015, 11/18/16, 6/16/17 by Lauren Foster CNP    CARDIOVERSION  07/11/2018    CARDIOVERSION  11/19/2021    COLONOSCOPY N/A 04/28/2017    Procedure: COLONOSCOPY with 2 ascending polyps and 1 transverse polyp;  Surgeon: Jose Whittington MD;  Location: [facility];  Service:     CORONARY ANGIOGRAPHY ADULT ORDER      CV CORONARY ANGIOGRAM N/A 09/20/2017    Procedure: Coronary Angiogram;  Surgeon: Sergio Cervantes MD;  Location: [facility] Cath Lab;  Service:     CV CORONARY ANGIOGRAM N/A 01/24/2022    Procedure: Coronary Angiogram;  Surgeon: Christi Saunders MD;  Location: [facility] CV    CV LEFT HEART CATH N/A 01/24/2022    Procedure: Left Heart Cath;  Surgeon: Christi Saunders MD;  Location: [facility] CV    CV RIGHT AND LEFT HEART CATH N/A 01/24/2022    Procedure: Right and Left Heart Catherization;  Surgeon: Christi Saunders MD;  Location: [facility]    CV RIGHT HEART CATH MEASUREMENTS RECORDED N/A 3/12/2024    Procedure: Heart Cath Right Heart Cath;  Surgeon: Edgardo Deluna MD;  Location:  HEART CARDIAC CATH LAB    EP ICD GENERATOR REPLACEMENT DUAL N/A 10/28/2022    Procedure: Implantable Cardioverter Defibrillator Generator Replacement Dual;  Surgeon: Elo Collins MD;  Location: [facility] CV    EP ICD INSERT      FRACTURE SURGERY Left     wrist    HEART CATH, ANGIOPLASTY      IMPLANT AUTOMATIC  IMPLANTABLE CARDIOVERTER DEFIBRILLATOR      INGUINAL HERNIA REPAIR Left     while in the [employer] in [employer] after 13 month in [place]    INSERT / REPLACE / REMOVE PACEMAKER      IR MISCELLANEOUS PROCEDURE  2014    OTHER SURGICAL HISTORY      left hand surgery---tendon repair    IN ABLATE HEART DYSRHYTHM FOCUS  2011    Catheter Ablation Atrial Fibrillation PVI 2011 (Cryo+RF-PVI + roof line + CTI line)    IN ABLATE HEART DYSRHYTHM FOCUS  2011    Re-do PVI 2011 (RFA-PVI + CFE + VIDYA + confirmation of CTI line)    TOTAL SHOULDER REPLACEMENT Right 2016    Dr. Abernathy of [facility]    WRIST SURGERY Left     ZZC MYERS W/O FACETEC FORAMOT/DSKC  VRT SEG, CERVICAL      Laminectomy Lumbar;  Recorded: 2012;            Family History:   Reviewed and non-contributory.   Family History   Problem Relation Age of Onset    Cancer Mother         leukemia    Cancer Father         bladder    Cancer Sister         breast with lung met.    Aneurysm Sister     CABG Brother     CABG Brother     Valvular heart disease Brother         valve replacement            Social History:     Social History     Tobacco Use    Smoking status: Former     Packs/day: 1.00     Years: 4.00     Additional pack years: 0.00     Total pack years: 4.00     Types: Cigarettes     Quit date: 1968     Years since quittin.2    Smokeless tobacco: Never   Substance Use Topics    Alcohol use: Yes     Alcohol/week: 2.0 standard drinks of alcohol     Comment: Alcoholic Drinks/day: 1 beer per week     History   Sexual Activity    Sexual activity: Yes    Partners: Female    Birth control/ protection: Post-menopausal            Current Medications:      acetaZOLAMIDE  250 mg Oral Daily    acetylcysteine  2 mL Nebulization Q4H    And    albuterol  2.5 mg Nebulization Q4H    aspirin  81 mg Oral Daily    atorvastatin  40 mg Oral QPM    bumetanide  0.5 mg Oral BID    [START ON 3/22/2024] bumetanide  1 mg Oral Once     empagliflozin  10 mg Oral Daily    FLUoxetine  20 mg Oral Daily    fluticasone-vilanterol  1 puff Inhalation Daily    metoprolol succinate ER  12.5 mg Oral Daily    nystatin  500,000 Units Swish & Swallow 4x Daily    pantoprazole  40 mg Oral BID AC    polyethylene glycol  17 g Oral BID    [START ON 3/22/2024] predniSONE  40 mg Oral Daily    Followed by    [START ON 3/27/2024] predniSONE  20 mg Oral Daily    Followed by    [START ON 4/1/2024] predniSONE  10 mg Oral Daily    sodium chloride (PF)  3 mL Intracatheter Q8H    thiamine  100 mg Oral Daily    umeclidinium  1 puff Inhalation Daily    vitamin C  1,000 mg Oral Daily    vitamin D2  50,000 Units Oral Once per day on Monday Thursday    Warfarin Therapy Reminder  1 each Oral See Admin Instructions            Allergies:     Allergies   Allergen Reactions    Adhesive Tape Other (See Comments)     ADHESIVE TAPE; SKIN IRRITATION; Skin pulled off with foam tape      Amiodarone      ADVERSE REACTION.  Sunlight sensitivity.    Lisinopril             Physical Exam:   Vitals were reviewed  Patient Vitals for the past 24 hrs:   BP Temp Temp src Pulse Resp SpO2 Weight   03/21/24 1225 100/67 97.6  F (36.4  C) Oral 86 22 93 % --   03/21/24 0725 103/70 96.8  F (36  C) Oral 81 22 90 % --   03/21/24 0500 -- -- -- -- -- 95 % --   03/21/24 0330 94/54 97.2  F (36.2  C) Axillary 95 22 98 % 103.8 kg (228 lb 13.4 oz)   03/21/24 0100 -- -- -- -- -- 95 % --   03/20/24 2027 -- -- -- -- -- 93 % --   03/20/24 1954 109/64 97.6  F (36.4  C) Oral 86 20 94 % --   03/20/24 1512 111/75 97.4  F (36.3  C) Oral 89 19 92 % --       Physical Examination:  GENERAL:  well-developed, well-nourished, in bed in no acute distress.   HEENT:  Head is normocephalic, atraumatic   EYES:  Eyes have anicteric sclerae without conjunctival injection or stigmata of endocarditis.    ENT:  Oropharynx is moist without exudates or ulcers. Tongue is midline  NECK:  Supple. No cervical lymphadenopathy  LUNGS:  Clear to  "auscultation bilateral.   CARDIOVASCULAR:  Regular rate and rhythm with no murmurs, gallops or rubs.  ABDOMEN:  Normal bowel sounds, soft, nontender. No appreciable hepatosplenomegaly  SKIN:  No acute rashes.  Line(s) are in place without any surrounding erythema or exudate. No stigmata of endocarditis.  NEUROLOGIC:  Grossly nonfocal. Active x4 extremities         Laboratory Data:     Inflammatory Markers    Recent Labs   Lab Test 03/11/24  1028 02/15/22  0941   SED 56*  --    CRP  --  <2.9       Hematology Studies    Recent Labs   Lab Test 03/21/24  0448 03/20/24  0444 03/19/24  0548 03/18/24  0413 03/17/24  0517 03/16/24  0601   WBC 13.4* 14.6* 11.2* 11.2* 11.9* 11.4*   HGB 9.5* 9.3* 9.7* 9.8* 9.2* 10.2*   * 100 101* 101* 98 98   * 128* 138* 160 157 188       Metabolic Studies     Recent Labs   Lab Test 03/21/24  0448 03/20/24  0444 03/19/24  0548 03/18/24  0413 03/17/24  0517    137 139 139 139   POTASSIUM 4.4 4.8 4.6 4.5 4.4   CHLORIDE 104 104 105 103 102   CO2 23 23 26 27 25   BUN 52.1* 50.3* 50.1* 54.8* 60.8*   CR 1.78* 1.66* 1.65* 1.78* 1.83*   GFRESTIMATED 38* 42* 42* 38* 37*       Hepatic Studies    Recent Labs   Lab Test 03/13/24  0644 03/11/24  0617 03/09/24  2315 05/20/23  0427 05/19/23  0446 05/18/23  0855 02/15/22  0941   BILITOTAL 1.2 1.8* 1.7*  --  1.0 1.0 0.5   ALKPHOS 61 66 77  --  56 69 56   ALBUMIN 3.4* 3.9 4.3 3.3* 3.3* 3.7 3.3*   AST 30 32 33  --  24 24 23   ALT 17 13 13  --  15 15 24       Microbiology:  All cultures:  Recent Labs   Lab 03/16/24  1622 03/16/24  1614   CULTURE No growth after 4 days No growth after 4 days        Respiratory Virus Testing    No results found for: \"RS\", \"FLUAG\"       CT CHEST (3/9):  IMPRESSION:   1.  Segments of cylindrical bronchiectasis with endobronchial mucosal thickening and secretions in the right middle lobe, lingula and both lower lobes as well as patchy airspace opacity in the adjacent lung parenchyma consistent with " bronchopneumonia.   2.  Hyperinflation of the lungs and moderate mid and upper lung centrilobular emphysema.   3.  Moderate cardiomegaly and severe three-vessel coronary artery calcification.  4.  Enlargement central pulmonary arteries (MPA diameter 4.6 cm) consistent with chronic pulmonary arterial hypertension.      NM LUNG SCAN PERFUSION PARTICULATE (3/11)  Impression: Low probability of pulmonary embolism based on this perfusion only study.    RHC (3/12):  PCWP: 6 mmHg       PA: 57/20/35 mmHg (s/d/m)      RV: 57/5 mmHg (s/ed)      RA: --/17/11 mmHg      CO 4.15 L/min (thermodilution); 4.51 L/min (Shikha)      CI: 1.83 L/min/m2 (TD); 1.98 L/min/m2 (Shikha)  PVR: 6.9 (TD) Wood Units   Right sided filling pressures are mildly elevated. Left sided filling pressures are normal. Moderately elevated pulmonary artery hypertension. Reduced cardiac output level.     CXR (3/16):  IMPRESSION: Cardiomegaly with bibasilar presumed subsegmental atelectasis versus interstitial edema. No radiographic evidence for lobar collapse or mucous plugging.    CXR (3/21):  Impression: Cardiomegaly with mildly improved opacities compared to 3/14/2024, representing a combination of chronic fibrosis and likely mild edema.     FELLOW ATTESTATION  I was present with the medical student, who participated in the service and in the documentation of the note.  I have verified the history and personally performed the physical exam and medical decision making.  I agree with the assessment and plan of care as documented in the note. Staffed with Dr. Gamble.

## 2024-03-22 NOTE — PLAN OF CARE
"Goal Outcome Evaluation:  Shift [id number]    /72 (BP Location: Right arm)   Pulse 71   Temp 97.5  F (36.4  C) (Axillary)   Resp 14   Ht 1.905 m (6' 3\")   Wt 105.7 kg (233 lb 0.4 oz)   SpO2 90%   BMI 29.13 kg/m       Plan of Care Reviewed With: patient  Overall Patient Progress: improving    Neuro: A&Ox4. Very [place], able to make needs known  Cardiac: No tele ordered. VSS.   Respiratory: On bipap while sleeping at 70% FiO2, O2 needs increase up to 85% With activity. HFNC during day  GI/: Adequate urine output. Last BM yesterday  Diet/appetite: soft/ bite sized diet w/ 1:1 supervision while eating  Activity:  Assist of 1 with walker and gait belt, up to bedside commode  Pain: denies  Skin: No new deficits noted. Generalized bruising and scabbing  LDA's: 4 right lower arm PIVs both SL    Plan: Continue with POC. Notify primary team with changes.         "

## 2024-03-22 NOTE — PROGRESS NOTES
INR at home 2.6. Continue current management dosing of Warfarin. Continue  diet of moderate Vitamin K intake. Discussed with pt the need to call with questions or concerns or any change in medication especially herbal medication or OTC. Call with increased bleeding or bruising or any upcoming procedures.     Referral signed

## 2024-03-22 NOTE — PLAN OF CARE
Temp: 97.4  F (36.3  C) Temp src: Oral BP: 103/65 Pulse: 88   Resp: 20 SpO2: 96 % O2 Device: High Flow Nasal Cannula (HFNC) Oxygen Delivery: 50 LPM    Pt alert, oriented. Very [place], hearing aids in  at bedside overnight. Very low respiratory reserves, SOB with minimal exertion. Remains on HFNC, 60% fiO2 and 55L flow to maintain sats. Bipap for overnight. Good appetite, using appropriate swallow techniques. Supervised while eating. Wife at bedside, feels this is helping. Voiding in urinal. One BM this evening, small amount of blood noted by nursing assistant, possible hemorhoid or small skin irritation. Pt states this occasionally happens while at home. Generalized edema. PIV SL. Denies pain.     Assess for changes, continue plan of care.     Goal Outcome Evaluation: progressing

## 2024-03-22 NOTE — PROGRESS NOTES
[facility]    Medicine Progress Note - Hospitalist Service, GOLD TEAM 7    Date of Admission:  3/9/2024      Assessment & Plan   Mr. Buck Sinclair is a 78 yo male with hx of depression, HLD, HTN, CAD s/p CANDELARIA placement (9/2017), CKDIII, afib s/p cardioversions and ablations in 2011 (on warfarin PTA), ICM s/p initial PPM placement in 1999 followed by ICD placement (6/2014), and chronic hypoxic respiratory failure with BL supplemental O2 requirement of 6-8L 2/2 CPFE and group III pulm HTN admitted initially to [facility] 3/8-3/9 for acute on chronic hypoxic respiratory failure and CHRISSY, transferred to Cards II service ICU at [facility] for higher level of care 3/9, transferred to medicine 3/15. FiO2 improving with sats >90% on on BiPAP 40-50% this AM (titrated from 60%). Continue to wean pt off to baseline 6-8 Lpm for sats between 88-92%.    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 Home inhaler regimen = symbicort, tiotropium, albuterol. Oxygen requirements are stable to slightly improving. - Levaquin 500 mg po daily completed 7 day course on 3/19. Walked the hallway with PT yesterday 3/20 and had dyspnea on exertion. Hemoptysis reported yesterday 3/21 but CXR was  unremarkable and showed improved opacities representing combination of chronic fibrosis and mild edema. Still needing high amount of oxygen. He was on 70% FiO2 on BipAP when sleeping but needed 85% FiO2 with activity. Patient is at high risk for PE based on the [place] PE risk score. He has elevated BNP without pulmonary edema, elevated troponin, hemoptysis, right leg that is more swollen than the left, and had been hospitalized since 3/9. Will be evaluating PE and DVT with V/Q scan and bilateral LE venous duplex US. Troponin was elevated at 38 and d-dimer WNL. The patient is requiring high amount of oxygen, worsening leukocytosis, and chronic hemoptysis and worsening lactic acidosis, concerning for new infection. Initiate work up for viral infections. He coughs a bit of blood blood daily at baseline. Lactic improved from 2.4 to 2.1 today. COVID-19, influenza, RSV and respiratory viral were negative.  - Start oral prednisone 40 mg daily (3/22 to 3/26), then 20 mg x 5 days, 10 mg x 5 days, then off  - Once daily Breo ellipta and umeclidium (getting LABA, LAMA, ICS);   - Mucomyst and albuterol q4h  - wean HFNC to baseline 6-8 Lpm via NC as able to keep sats between 88-92%;  - Pulm Consult   - Taper prednisone - 40 x 5 days, 20 x 5 days, 10 x 5 days, then off   - Blood cultures  - D-dimer    CV  1) Ischemic Cardiomyopathy with recovered EF (55-60%) and moderately reduced RV function  2) Chronic, persistent afib, on warfarin (INR goal 2-3)  3) Benign Essential (primary) HTN  4) CAD s/p CANDELARIA placement (9/2017)  5) Dyslipidemia   Home meds: Toprol XL 25 mg daily, Bumex 2 mg BID, 81 mg aspirin, warfarin, empagliflozin, atorvastatin 40. BNP was elevated 5,323 3/20 down from 6537 on 3/19. ECHO on 3/9 showed severe right ventricular dilation. RHC on 3/9 found mildly elevated right sided filling pressure and moderately elevated pulmonary artery hypertension. Pt still with significant LE edema. Acetazolamide was added on 3/20 to  increases diuresis. Oral intake yesterday 3/20 was 2.4L and output 1.6 L with net output of +0.8 L. Bumex was increased to 1.0 mg yesterday 3/21. Will be increased to 1.5 mg today. Urine output yesterday was 2.1 L with net -1.4 L, meeting target goals. His weight however increased from 228 lb to 233 lb today.    - Bumex 0.5 mg TID starting today   - pharmacy dosing warfarin,   - atorvastatin 3/16  - Continue Metoprolol 12.5 mg PO daily  - Start Acetazolamide 250 mg daily  - Target one liter net negative daily for the next two days    Resolved problems:  Lactic acidosis 3/16, r/o sepsis; suspect type B due to albuterol. Lactate at 2.8 today  CHRISSY on CKD3a due to overdiuresis/prerenal etiology,   Oral thrush: Continue nystatin 364353 units QID x 10 days total    Chronic problems:  Moderate acute on chronic oropharyngeal dysphagia: Speech following video swallow performed  - Continue minced and moist textues (5) with thin liquid (0) diet; added Ensure 3/16   - patient pretty unhappy with the diet, SLP to continue working with him to advance    DM2: A1C 6.9%, on monotherapy with empagliflozin PTA; stop BG checks as patient's BG's have been within goal for entirety of hospital stay (140-180)  - PTA empagliflozin 10 mg      Major Depressive Disorder: home fluoxetine 20 mg daily          Diet: Soft & Bite Sized Diet (level 6) Thin Liquids (level 0)  Snacks/Supplements Adult: Ensure Enlive; Between Meals  Room Service    DVT Prophylaxis: Warfarin  Sawyer Catheter: Not present  Lines: None     Cardiac Monitoring: None  Code Status: No CPR- Do NOT Intubate      Kiet Hughes  Hospitalist Service, [facility] TEAM [address] [place] [address]  Securely message with [employer] (more info)  Text page via [employer] Paging/Directory   See signed in provider for up to date coverage information        Physician Attestation   I, Elana Garcia MD, was present with the medical/OZ student who participated in the  service and in the documentation of the note.  I have verified the history and personally performed the physical exam and medical decision making.  I agree with the assessment and plan of care as documented in the note.      Key findings:     Please see A&P for additional details of medical decision making.    I have personally reviewed the following data over the past 24 hrs:    16.5 (H)  \   9.3 (L)   / 131 (L)     137 106 51.1 (H) /  131 (H)   4.2 24 1.73 (H) \     Trop: 38 (H) BNP: N/A     Procal: N/A CRP: N/A Lactic Acid: 2.1 (H)       INR:  2.45 (H) PTT:  N/A   D-dimer:  0.38 Fibrinogen:  N/A       -With hemoptysis, increasing oxygen requirements, right lower extremity more edematous than left lower extremity, lactic acidosis, elevated troponin, I am concerned for venous thromboembolism.  Obtained D-dimer, bilateral lower extremity venous duplex, and VQ scan.  Unfortunately, the patient has CKD stage IV based on Cystatin C [much worse than his GFR based on creatinine] making CTPE risk intervention.  Ordered VQ scan.  [Reviewed D-dimer and within normal limits making pulmonary embolism less likely  - Changing bumex to lasix IV 60 mg BID, so technically an increase in diuresis continue acetazolamide  -Ordered COVID/RSV/influenza/respiratory virus panel/respiratory culture, and repeating MRSA.  Ordered 2 view chest x-ray and started empirical Zosyn  -His worsening respiratory failure is highly concerning, I suspect the patient will end up needing to be monitored closely while on FiO2 100% on BiPAP perhaps in an ICU setting  -Discussed with pulm/critical care  Elana Garcia MD  Date of Service (when I saw the patient): 03/22/24     Billing for prolonged care  I spent a total of 70 minutes between chart review, discussing the care with consultants, and directing the care with nursing staff and radiology.  _____________________________________________________________________    Interval History   Patient last  night needed increased amount of FiO2 which new. Used 70% FiO2 on BiPAP last night, which increased to 85% with activity. He indicated his mask needed to be changed due to lack of tight fitting. Believes that process led to him requiring more oxygen. Otherwise, slept well and denies any chest pain, abdominal, nausea, diarrhea. Felt a bit constipated but improved on miralax.     Physical Exam   Vital Signs: Temp: 97.5  F (36.4  C) Temp src: Axillary BP: 126/81 Pulse: 92   Resp: 20 SpO2: 92 % O2 Device: BiPAP/CPAP Oxygen Delivery: 50 LPM  Weight: 233 lbs .42 oz    Sitting on a chair in no acute distress, eating breakfast  Awake, alert, answers questions and follows commands  Respiratory rate normal, mild crackles on auscultation, on 50 LPM HFNC with FiO2 of 55%.  Extremities with mild edema     MDM: high  See problem list above  Reviewed CBC, renal panel, Mg, lactic acids, INR, overnight vitals while on BiPAP   high risk due to continued need for HFNC and BiPAP to maintain SpO2

## 2024-03-22 NOTE — PROGRESS NOTES
"/68 (BP Location: Left arm)   Pulse 79   Temp 97.5  F (36.4  C) (Oral)   Resp 22   Ht 1.905 m (6' 3\")   Wt 105.7 kg (233 lb 0.4 oz)   SpO2 98%   BMI 29.13 kg/m      Shift [id number]    Neuro: A&Ox4.   Cardiac: SR. VSS.   Respiratory: Sating >89% on Bipap at 55 FIO2.  GI/: Adequate urine output. Uses bedside commode.  Diet/appetite: Tolerating Regular diet. Eating well.  Activity:  Assist of 1-2, up to chair and in halls.  Pain: At acceptable level on current regimen.   Skin: No new deficits noted.  LDA's: PIVx2 R arm SL.    Plan: Wean down to baseline 6-8 LPM NC. Notify Gold 7 with changes.    "

## 2024-03-23 NOTE — PLAN OF CARE
0700 - 1930  Neuro: A&Ox4.   Cardiac: History of Afib, not on tele. VSS.   Respiratory: Sating 94-95% on BiPAP, 90% FiO2. Very little reserves. Desat today with standing to 55% MD notfied. Needing bipap for recovery. Over 5 mins to recover spo2. Keep sp02 88 to 92%  GI/: Adequate urine output. BM once.   Diet/appetite: Tolerating regular diet. Eating well.  Activity:  Assist of 1-2, up to chair/bedside commode.   Pain: At acceptable level on current regimen.   Skin: No new deficits noted.  LDA's: R PIV x2, SL.     Plan: Continue with POC. Notify primary team with changes.

## 2024-03-23 NOTE — PLAN OF CARE
[id number]  Neuro: A&Ox4.   Cardiac: History of Afib, not on tele. VSS.   Respiratory: Sating 94-95% on BiPAP, 55% FiO2.   GI/: Adequate urine output. BM once.   Diet/appetite: Tolerating regular diet. Eating well.  Activity:  Assist of 1-2, up to chair/bedside commode.   Pain: At acceptable level on current regimen.   Skin: No new deficits noted.  LDA's: R PIV x2, SL.     Plan: Continue with POC. Notify primary team with changes.     2213 BPA Sepsis Alert triggered. Orders made and carried out.   2243 Lactic Acid - 2.2. Code Sepsis activated. MD Notified. Seen and assessed by PA. For Repeat blood test at 0200. Latest lactic 1.7

## 2024-03-23 NOTE — CODE/RAPID RESPONSE
Rapid Response Team Note    Assessment   In assessment a rapid response was called on Buck Sinclair due to SIRS/Sepsis trigger and lactic acidosis. This presentation is likely due to pneumonia and diuresis (increased dose today)    Plan   -  Continue zosyn  -  Recheck lactic per protocol at 0200  -  The Internal Medicine primary team was able to be reached and they are in agreement with the above plan.  -  Disposition: The patient will remain on the current unit. We will continue to monitor this patient closely.  -  Reassessment and plan follow-up will be performed by the primary team    Brenda Wick PA-C  [facility] RRT [facility] Job Code Contact #[phone]  [facility] Paging/Directory    Hospital Course   Brief Summary of events leading to rapid response:   Triggered sepsis with lactic 2.2. Patient denies any changes in symptoms. Breathing feels good. No dysuria. No new abdominal pain. Vitals stable, afebrile. Reviewed prior lactic acid and stable from prior.     Admission Diagnosis:   Pulmonary hypertension (H) [I27.20]    Physical Exam   Temp: 97.8  F (36.6  C) Temp  Min: 97  F (36.1  C)  Max: 97.8  F (36.6  C)  Resp: 19 Resp  Min: 14  Max: 22  SpO2: (!) 86 % SpO2  Min: 86 %  Max: 98 %  Pulse: 100 Pulse  Min: 71  Max: 100    No data recorded  BP: 110/83 Systolic (24hrs), Av , Min:97 , Max:126   Diastolic (24hrs), Av, Min:60, Max:83     I/Os: I/O last 3 completed shifts:  In: 1086 [P.O.:1080; I.V.:6]  Out: 1750 [Urine:1750]     Exam:   General: Awake. Alert and oriented. No acute distress.  CV: RRR.  Pulm: Normal work of breathing. Lungs clear.  GI: Abdomen non-distended. Non-tender.    Significant Results and Procedures   Lactic Acid:   Recent Labs   Lab Test 24  2225 24  1149 24  2036   LACT 2.2* 2.1* 2.4*     CBC:   Recent Labs   Lab Test 24  0431 24  0448 24  0444   WBC 16.5* 13.4* 14.6*   HGB 9.3* 9.5* 9.3*   HCT 29.4* 29.4* 29.2*   * 125* 128*         Sepsis Evaluation   The patient is known to have an infection.  NO EVIDENCE OF SEPSIS at this time.  Vital sign, physical exam, and lab findings are due to pneumonia, diuresis.

## 2024-03-23 NOTE — PROVIDER NOTIFICATION
"   03/22/24 2249   Call Information   Date of Call 03/22/24   Time of Call 2249   Title of person requesting team RN   RRT Arrival time 2252   Time RRT ended 2257   Reason for call   Type of RRT Adult   Primary reason for call Sepsis suspected   Sepsis Suspected Elevated Lactate level   Was patient transferred from the ED, ICU, or PACU within last 24 hours prior to RRT call? No   SBAR   Situation LA 2.2   Background Per recent teams note, \"Mr. Buck Sinclair is a 80 yo male with hx of depression, HLD, HTN, CAD s/p CANDELARIA placement (9/2017), CKDIII, afub s/p cardioversions and ablations in 2011 (on warfarin PTA), ICM s/p initial PPM placement in 1999 followed by ICD placement (6/2014), and chronic hypoxic respiratory failure with BL supplemental O2 requirement of 6-8L 2/2 CPFE and group III pulm HTN admitted initially to [facility] 3/8-3/9 for acute on chronic hypoxic respiratory failure and CHRISSY, transferred to Cards II service ICU at [facility] for higher level of care 3/9, now medically stable to transfer to [facility] floor on 3/15.\"   Notable History/Conditions Cardiac;COPD;Hypertension   Assessment In bed, A&Ox4, answering questions appropriately. Vital signs stable. toleraing his bipap, unlabored breathing.   Interventions Labs   Adjustments to Recommend recheck lactic   Patient Outcome   Patient Outcome Stabilized on unit   RRT Team   Attending/Primary/Covering Physician Gold 7   Date Attending Physician notified 03/22/24   Time Attending Physician notified 2249   Physician(s) Brenda CARDONA   Lead RN Kerry Nolen   RT Bill   Post RRT Intervention Assessment   Post RRT Assessment Stable/Improved   Date Follow Up Done 03/23/24   Time Follow Up Done 0318   Comments Awaiting follow up LA.       "

## 2024-03-23 NOTE — PROGRESS NOTES
[facility]    Medicine Progress Note - Hospitalist Service, GOLD TEAM 7    Date of Admission:  3/9/2024      Assessment & Plan   Mr. Buck Sinclair is a 80 yo male with hx of depression, HLD, HTN, CAD s/p CANDELARIA placement (9/2017), CKDIII, afib s/p cardioversions and ablations in 2011 (on warfarin PTA), ICM s/p initial PPM placement in 1999 followed by ICD placement (6/2014), and chronic hypoxic respiratory failure with BL supplemental O2 requirement of 6-8L 2/2 CPFE and group III pulm HTN admitted initially to [facility] 3/8-3/9 for acute on chronic hypoxic respiratory failure and CHRISSY, transferred to Cards II service ICU at [facility] for higher level of care 3/9, transferred to medicine 3/15. FiO2 improving with sats >90% on on BiPAP 40-50% this AM (titrated from 60%). Continue to wean pt off to baseline 6-8 Lpm for sats between 88-92%.    Changes for Today  - Metoprolol, lasix and acetazolamide stopped due to soft blood pressures.  - Oxygen use improved today  - Pulmonary embolism evaluation was normal, viral infection work up unremarkable.     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 Home inhaler regimen = symbicort, tiotropium, albuterol. Oxygen requirements are stable to slightly improving. - Levaquin 500 mg po daily completed 7 day course on 3/19. Walked the hallway with PT yesterday 3/20 and had dyspnea on exertion. Hemoptysis reported yesterday 3/21 but CXR was unremarkable and showed improved opacities representing combination of chronic fibrosis and mild edema. Still needing  high amount of oxygen. He was on 70% FiO2 on BipAP when sleeping but needed 85% FiO2 with activity. Patient is at high risk for PE based on the [place] PE risk score. He has elevated BNP without pulmonary edema, elevated troponin, hemoptysis, right leg that is more swollen than the left, and had been hospitalized since 3/9. Will be evaluating PE and DVT with V/Q scan and bilateral LE venous duplex US. Troponin was elevated at 38 and d-dimer WNL. The patient is requiring high amount of oxygen, worsening leukocytosis, and chronic hemoptysis and worsening lactic acidosis, concerning for new infection. Initiate work up for viral infections. He coughs a bit of blood blood daily at baseline. Lactic improved from 2.4 to 2.1 today. COVID-19, influenza, RSV and respiratory viral panel were negative. D-dimer was normal ruling out PE. V/Q scan showed not PE. No DVT on US. CXR unchanged from prior and unremarkable. Sputum culture pending. Started zosyn 4.5 mg yesterday, Could transition to Unasyn if sputum culture is negative.  - Start oral prednisone 40 mg daily (3/22 to 3/26), then 20 mg x 5 days, 10 mg x 5 days, then off  - Once daily Breo ellipta and umeclidium (getting LABA, LAMA, ICS);   - Mucomyst and albuterol q4h  - wean HFNC to baseline 6-8 Lpm via NC as able to keep sats between 88-92%;  - Pulm Consult   - Taper prednisone - 40 x 5 days, 20 x 5 days, 10 x 5 days, then off       CV  1) Ischemic Cardiomyopathy with recovered EF (55-60%) and moderately reduced RV function  2) Chronic, persistent afib, on warfarin (INR goal 2-3)  3) Benign Essential (primary) HTN  4) CAD s/p CANDELARIA placement (9/2017)  5) Dyslipidemia   Home meds: Toprol XL 25 mg daily, Bumex 2 mg BID, 81 mg aspirin, warfarin, empagliflozin, atorvastatin 40. BNP was elevated 5,323 3/20 down from 6537 on 3/19. ECHO on 3/9 showed severe right ventricular dilation. RHC on 3/9 found mildly elevated right sided filling pressure and moderately elevated pulmonary  artery hypertension. Pt still with significant LE edema. Acetazolamide was added on 3/20 to increases diuresis. Oral intake yesterday 3/20 was 2.4L and output 1.6 L with net output of +0.8 L. Bumex was increased to 1.0 mg yesterday 3/21. Will be increased to 1.5 mg on 3/22. Urine output on 3/21 was 2.1 L with net -1.4 L, meeting target goals. His weight however increased from 228 lb to 233 lb on 3/22 but stable today 3/23.  UOP on 3/22 was 1.8 with net of 1.1 L. Pt had soft blood pressures so metoprolol, acetazolamide and lasix stopped and albumin added.  - Stop Lasix 40 mg BID.   - pharmacy dosing warfarin,   - atorvastatin 3/16  - Stop Metoprolol 12.5 mg PO daily  - Stop Acetazolamide 250 mg daily  - Start Spironolactone 12.5 mg  - Albumin 25 mg IV      Resolved problems:  Lactic acidosis 3/16, r/o sepsis; suspect type B due to albuterol. Lactate at 1.7 on 3/23.  CHRISSY on CKD3a due to overdiuresis/prerenal etiology,   Oral thrush: Continue nystatin [id number] units QID x 10 days total    Chronic problems:  Moderate acute on chronic oropharyngeal dysphagia: Speech following video swallow performed  - Continue minced and moist textues (5) with thin liquid (0) diet; added Ensure 3/16   - patient pretty unhappy with the diet, SLP to continue working with him to advance    DM2: A1C 6.9%, on monotherapy with empagliflozin PTA; stop BG checks as patient's BG's have been within goal for entirety of hospital stay (140-180)  - PTA empagliflozin 10 mg      Major Depressive Disorder: home fluoxetine 20 mg daily          Diet: Soft & Bite Sized Diet (level 6) Thin Liquids (level 0)  Snacks/Supplements Adult: Ensure Enlive; Between Meals  Room Service    DVT Prophylaxis: Warfarin  Sawyer Catheter: Not present  Lines: None     Cardiac Monitoring: None  Code Status: No CPR- Do NOT Intubate      Kiet Hughes  Hospitalist Service, GOLD TEAM [address] [place] [address]  Securely message with [email or web address] [place]  info)  Text page via [facility] Paging/Directory   See signed in provider for up to date coverage information        Physician Attestation   I, Elana Garcia MD, was present with the medical/OZ student who participated in the service and in the documentation of the note.  I have verified the history and personally performed the physical exam and medical decision making.  I agree with the assessment and plan of care as documented in the note.      Key findings:     Please see A&P for additional details of medical decision making.    I have personally reviewed the following data over the past 24 hrs:    14.4 (H)  \   8.9 (L)   / 121 (L)     141 106 51.6 (H) /  152 (H)   4.0 23 1.84 (H) \     Procal: N/A CRP: N/A Lactic Acid: 1.7       INR:  2.32 (H) PTT:  N/A   D-dimer:  N/A Fibrinogen:  N/A         Elana Garcia MD  Date of Service (when I saw the patient): 03/23/24 ____________________________________________________________________    Interval History     SIRS/Sepsis response was called for elevated lactic acid of 2.2 zosyn was given. Repeat LA  was 1.7. Sating at 94-95% on BiPAP, 55% FiO2 this AM. FiO2 was between 55 and 65 last night    Physical Exam   Vital Signs: Temp: 97.7  F (36.5  C) Temp src: Axillary BP: 117/70 Pulse: 86   Resp: 20 SpO2: 99 % O2 Device: BiPAP/CPAP Oxygen Delivery: 40 LPM  Weight: 233 lbs .42 oz    Sitting on a chair in no acute distress  Awake, alert, answers questions and follows commands  Respiratory rate normal, on 50 LPM HFNC with FiO2 of 55%.  Extremities with mild edema, improving     MDM: high  See problem list above  Reviewed CBC, renal panel, Mg, lactic acids, INR, overnight vitals while on BiPAP   high risk due to continued need for HFNC and BiPAP to maintain SpO2

## 2024-03-24 NOTE — PLAN OF CARE
[id number]  Neuro: A&Ox4.   Cardiac: History of Afib, not on tele. VSS.      Respiratory: Sating 88-92% on BiPAP 45%-60% FiO2 at night. HFNC FiO2 70-80%, 40L  GI/: Adequate urine output. No BM this shift.   Diet/appetite: Tolerating regular diet. Eating well.  Activity:  Assist of 1-2, up to chair/bedside commode.   Pain: At acceptable level on current regimen.   Skin: No new deficits noted.  LDA's: R PIV x2, SL.     Plan: Continue with POC. Notify primary team with changes.

## 2024-03-24 NOTE — PLAN OF CARE
[id number]  Neuro: A&Ox4.   Cardiac: History of Afib, not on tele. VSS.      Respiratory: Sating 88-92% on BiPAP 45%-60% FiO2 at night. HFNC FiO2 50-80%, 40L during the day  GI/: Adequate urine output. No BM this shift.   Diet/appetite: Tolerating regular diet. Eating well.  Activity:  Assist of 1-2, up to chair/bedside commode.   Pain: At acceptable level on current regimen.   Skin: No new deficits noted.  LDA's: R PIV x2, SL.     Plan: Continue with POC. Notify primary team with changes.

## 2024-03-24 NOTE — PROGRESS NOTES
[facility]    Medicine Progress Note - Hospitalist Service, GOLD TEAM 7    Date of Admission:  3/9/2024    Assessment & Plan   80 yo male with hx of depression, HLD, HTN, CAD s/p CANDELARIA placement (9/2017), CKDIII, afib s/p cardioversions and ablations in 2011 (on warfarin PTA), ICM s/p initial PPM placement in 1999 followed by ICD placement (6/2014), and chronic hypoxic respiratory failure with BL supplemental O2 requirement of 6-8L 2/2 CPFE and group III pulm HTN admitted initially to [facility] 3/8-3/9 for acute on chronic hypoxic respiratory failure and CHRISSY, transferred to Cards II service ICU at [facility] for higher level of care 3/9, transferred to medicine 3/15.    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  The patient priorly completed 7 days course of levofloxacin on 3/19.  Prior infectious workup showed pansensitive Staphylococcus aureus dated 3/11.  COVID-19/influenza/RSV/respiratory virus panel/MRSA swab have all been done during this admission and have all been negative.  -Following final results of sputum culture dated 3/23  -Switched Zosyn to Unasyn given lack of evidence of pseudomonal infection, plan for a total course of 7 days of antibiotics  -Continue taper of prednisone per recommendation of pulmonology  -Continue PPI while on steroids  -High flow nasal cannula in the morning and BiPAP at night targeting oxygen saturation of 88 to 92%  -Continue Mucomyst and albuterol every 4 hours, Breo Ellipta 1 puff daily, Incruse Ellipta 1 puff daily and albuterol every 4 hours as needed    2.  Acute on chronic heart failure with recovered LVEF 55% and moderately reduced right ventricular  "function  The patient had an episode of hypotension without symptoms within the last 24 hours secondary to increased urinary output in the setting of diuresis.  All diuretics were held on 3/23 and the patient received small volume resuscitation with 25% albumin at 25 g.  Blood pressure improved on 3/24.  -Resume reduced dose of furosemide at 60 mg daily  -Resumed reduced dose of acetazolamide at 125 mg daily  -Started low-dose spironolactone at 12.5 mg daily as an anti-aldosterone rather than a diuretic  -Continue Jardiance 10 mg daily    3.  Chronic medical problems:  # Paroxysmal atrial fibrillation, continue metoprolol succinate at 12.5 mg daily [to be resumed on 3/25] and anticoagulation with warfarin  # Hyperlipidemia and coronary artery disease, continue statins and aspirin 81 mg daily  # Depression, continue Prozac 20 mg daily            Diet: Soft & Bite Sized Diet (level 6) Thin Liquids (level 0)  Snacks/Supplements Adult: Ensure Enlive; Between Meals  Room Service    DVT Prophylaxis: Warfarin  Sawyer Catheter: Not present  Lines: None     Cardiac Monitoring: None  Code Status: No CPR- Do NOT Intubate      Clinically Significant Risk Factors              # Hypoalbuminemia: Lowest albumin = 3.4 g/dL at 3/13/2024  6:44 AM, will monitor as appropriate   # Thrombocytopenia: Lowest platelets = 101 in last 2 days, will monitor for bleeding   # Hypertension: Noted on problem list  # Acute heart failure with preserved ejection fraction: heart failure noted on problem list, last echo with EF >50%, and receiving IV diuretics      # DMII: A1C = 6.9 % (Ref range: <5.7 %) within past 6 months   # Overweight: Estimated body mass index is 28.93 kg/m  as calculated from the following:    Height as of this encounter: 1.905 m (6' 3\").    Weight as of this encounter: 105 kg (231 lb 7.7 oz).      # Financial/Environmental Concerns: none  # COPD: noted on problem list  # ICD device present       Disposition Plan       "       Elana Garcia MD  Hospitalist Service, GOLD TEAM 7  [facility]  Securely message with [employer] (more info)  Text page via [employer] Paging/Directory   See signed in provider for up to date coverage information  ______________________________________________________________________    Interval History   The patient continues to report slight improvement in his shortness of breath.  No chest pain or wheezing reported.    Physical Exam   Vital Signs: Temp: 97.3  F (36.3  C) Temp src: Oral BP: 96/69 Pulse: 81   Resp: 20 SpO2: 92 % O2 Device: High Flow Nasal Cannula (HFNC) Oxygen Delivery: 40 LPM  Weight: 231 lbs 7.73 oz    Constitutional: Awake, alert, cooperative, no apparent distress.  Eyes: Conjunctiva and pupils examined and normal.  HEENT: Moist mucous membranes, normal dentition.  Respiratory: Fine basilar rales are noted  Cardiovascular: Regular rate and rhythm, normal S1 and S2, and no murmur noted.  GI: Soft, non-distended, non-tender, normal bowel sounds.  Lymph/Hematologic: No anterior cervical or supraclavicular adenopathy.  Skin: No rashes, no cyanosis, no edema.  Musculoskeletal: No joint swelling, erythema or tenderness.  Neurologic: Cranial nerves 2-12 intact, normal strength and sensation.  Psychiatric: Alert, oriented to person, place and time, no obvious anxiety or depression.     Medical Decision Making       40 MINUTES SPENT BY ME on the date of service doing chart review, history, exam, documentation & further activities per the note.      Data     I have personally reviewed the following data over the past 24 hrs:    12.9 (H)  \   8.3 (L)   / 101 (L)     140 108 (H) 49.9 (H) /  126 (H)   3.8 22 1.88 (H) \     INR:  2.72 (H) PTT:  N/A   D-dimer:  N/A Fibrinogen:  N/A       Imaging results reviewed over the past 24 hrs:   No results found for this or any previous visit (from the past 24 hour(s)).

## 2024-03-25 NOTE — PLAN OF CARE
[id number]  Neuro: A&Ox4.   Cardiac: History of Afib, not on tele. SBP 100s.  Respiratory: Sating >88% on BiPAP. 60% FiO2. For HFNC 40L 70% in AM.Requires increase oxygen with activity  GI/: Adequate urine output. BM X1  Diet/appetite: Tolerating soft- diet sized diet. Eating well.  Activity:  Assist of 1-2, up to chair and bedside commode  Pain: At acceptable level on current regimen.   Skin: No new deficits noted.  LDA's: R PIV x2, SL, CDI    BPA Sepsis triggered, Lactic acid at 2.3. Code sepsis activated. Ordered for repeat blood at 2300. Page result to MD. Awaiting for any further orders.   Plan: Continue with POC. Notify primary team with changes.

## 2024-03-25 NOTE — CODE/RAPID RESPONSE
Rapid Response Team Note    Assessment   In assessment a rapid response was called on Buck Sinclair due to SIRS/Sepsis trigger and lactic acidosis. This presentation is likely due to diuresis.     Plan   -  Encouraged PO fluids  -  Recheck lactic at 2300  -  The Internal Medicine primary team was able to be reached and they are in agreement with the above plan.  -  Disposition: The patient will remain on the current unit. We will continue to monitor this patient closely.  -  Reassessment and plan follow-up will be performed by the primary team    Brenda Wick PA-C  [facility] RRT [facility] Job Code Contact #[phone]  [facility] Paging/Directory    Hospital Course   Brief Summary of events leading to rapid response:   Triggered sepsis with lactic 2.3  Reports feeling well. Breathing at baseline, no new cough. No fevers. Denies any new abdominal pain. Voiding well without dysuria. Vitals stable.     Admission Diagnosis:   Pulmonary hypertension (H) [I27.20]    Physical Exam   Temp: 98  F (36.7  C) Temp  Min: 97.3  F (36.3  C)  Max: 98  F (36.7  C)  Resp: 18 Resp  Min: 18  Max: 20  SpO2: 96 % SpO2  Min: 82 %  Max: 98 %  Pulse: 91 Pulse  Min: 79  Max: 91    No data recorded  BP: 116/83 Systolic (24hrs), Av , Min:96 , Max:116   Diastolic (24hrs), Av, Min:69, Max:87     I/Os: I/O last 3 completed shifts:  In: 440 [P.O.:240; I.V.:200]  Out: 1580 [Urine:1580]     Exam:   General: Awake. Alert and oriented x3. No acute distress. Sitting up in chair.  Pulm: Normal work of breathing on HFNC. Lungs overall pretty clear.  CV: RRR.  GI: Abdomen nondistended, soft. Non-tender.    Significant Results and Procedures   Lactic Acid:   Recent Labs   Lab Test 24   LACT 2.3* 1.7 2.2*     CBC:   Recent Labs   Lab Test 24   WBC 12.9* 14.4* 16.5*   HGB 8.3* 8.9* 9.3*   HCT 26.9* 28.3* 29.4*   * 121* 131*        Sepsis Evaluation   The  patient is known to have an infection.  NO EVIDENCE OF SEPSIS at this time.  Vital sign, physical exam, and lab findings are due to diruesis.

## 2024-03-25 NOTE — PROVIDER NOTIFICATION
"   03/24/24 2000   Call Information   Date of Call 03/24/24   Time of Call 2014   Name of person requesting the team Tamanna MCKEON   Title of person requesting team RN   RRT Arrival time 2017   Time RRT ended 2035   Reason for call   Type of RRT Adult   Primary reason for call Sepsis suspected   Sepsis Suspected Elevated Lactate level   Was patient transferred from the ED, ICU, or PACU within last 24 hours prior to RRT call? No   SBAR   Situation LA 2.3   Background Per recent teams note, \"Mr. Buck Sinclair is a 78 yo male with hx of depression, HLD, HTN, CAD s/p CANDELARIA placement (9/2017), CKDIII, afub s/p cardioversions and ablations in 2011 (on warfarin PTA), ICM s/p initial PPM placement in 1999 followed by ICD placement (6/2014), and chronic hypoxic respiratory failure with BL supplemental O2 requirement of 6-8L 2/2 CPFE and group III pulm HTN admitted initially to [facility] 3/8-3/9 for acute on chronic hypoxic respiratory failure and CHRISSY, transferred to Cards II service ICU at [facility] for higher level of care 3/9, now medically stable to transfer to [facility] floor on 3/15.\"   Notable History/Conditions Cardiac;COPD;Hypertension   Assessment In chair, A+Ox4, VSS.  actively diuresing.   Interventions Labs   Adjustments to Recommend LA recheck @ 23:00   Patient Outcome   Patient Outcome Stabilized on unit   RRT Team   Attending/Primary/Covering Physician Charlie 7   Date Attending Physician notified 03/24/24   Time Attending Physician notified 2014   Physician(s) Brenda CARDONA   Lead RN Nitesh MCKEON   RT Bill   Post RRT Intervention Assessment   Post RRT Assessment Stable/Improved   Date Follow Up Done 03/24/24   Time Follow Up Done 9378   Comments repeat LA 2.1.       "

## 2024-03-25 NOTE — PROGRESS NOTES
[facility]    Medicine Progress Note - Hospitalist Service, GOLD TEAM 7    Date of Admission:  3/9/2024    Assessment & Plan   78 yo male with hx of depression, HLD, HTN, CAD s/p CANDELARIA placement (9/2017), CKDIII, afib s/p cardioversions and ablations in 2011 (on warfarin PTA), ICM s/p initial PPM placement in 1999 followed by ICD placement (6/2014), and chronic hypoxic respiratory failure with BL supplemental O2 requirement of 6-8L 2/2 CPFE and group III pulm HTN admitted initially to [facility] 3/8-3/9 for acute on chronic hypoxic respiratory failure and CHRISSY, transferred to Cards II service ICU at [facility] for higher level of care 3/9, transferred to medicine 3/15.    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  The patient priorly completed 7 days course of levofloxacin on 3/19.  Prior infectious workup showed pansensitive Staphylococcus aureus dated 3/11.  COVID-19/influenza/RSV/respiratory virus panel/MRSA swab have all been done during this admission and have all been negative.  -Following final results of sputum culture dated 3/23 currently with filamentous fungus and Candida albicans, will only treat if there is evidence of Aspergillus  -Continue Unasyn for total course of antibiotic therapy of 7 days, end date has been placed in the epic  -Continue taper of prednisone per recommendation of pulmonology  -Continue PPI while on steroids  -High flow nasal cannula in the morning and BiPAP at night targeting oxygen saturation of 88 to 92%  -Continue Mucomyst and albuterol every 4 hours, Breo Ellipta 1 puff daily, Incruse Ellipta 1 puff daily and albuterol every 4 hours as needed  -Appreciative to the  pulmonology service, they are currently signed off    2.  Acute on chronic heart failure with recovered LVEF 55% and moderately reduced right ventricular function  The patient had an episode of hypotension without symptoms within the last 24 hours secondary to increased urinary output in the setting of diuresis.  All diuretics were held on 3/23 and the patient received small volume resuscitation with 25% albumin at 25 g.  Blood pressure improved on 3/24.  - Changed furosemide to 40 mg IV twice daily at 7 AM and again at 3 PM, this would be a good middle dose to prevent overdiuresis and under diuresis  -Continue acetazolamide at 125 mg daily  -Continue low-dose spironolactone at 12.5 mg daily as an anti-aldosterone rather than a diuretic  -Continue Jardiance 10 mg daily  - Ordered lymphedema consult for assistance with bilateral lower extremity edema    Addendum 3/25/24 at 5 pm   3. Aspergillus lung infection, POA   Aspergillus was found on the patient sputum culture dated 3/23 and updated on 3/25 at 3 PM.  Identification of species and sensitivities are currently pending.  I ordered twelve-lead EKG to evaluate QTc given prolongation of QTc with voriconazole.  I started the patient on voriconazole at 6 mg/kg IV twice a day on day 1, followed by 4 mg/kg twice daily for seven days, then voriconazole oral dosing at 200 mg orally twice daily thereaft for 6-month.  I ordered voriconazole level to be obtained on 3/30 [should be obtained after 4 to 7 days of initiation].  Given interaction between voriconazole and atorvastatin [voriconazole would have much better impact on mortality than statins at this point], I discontinued atorvastatin while undergoing voriconazole treatment.  I also placed a consult to general infectious disease for a.m. for further guidance.[PubMed [id number]]     4. Paroxysmal atrial fibrillation, [id number] score of 3, POA   - Decrease metoprolol succinate to 6.25 mg daily given concerns for  "bradycardia without symptoms  - Ordered cardiac monitoring conditional twelve-lead EKG to document bradycardia  - Continue anticoagulation with warfarin    5.  Oral candidiasis, not clear if present on admission  -I agree with initiation of nystatin for 7 days course    6.  Chronic medical problems:  # Hyperlipidemia and coronary artery disease, continue statins and aspirin 81 mg daily  # Depression, continue Prozac 20 mg daily    Billing  I spent 55 minutes gathering information for this encounter, reviewing literature, and applying evidence to the patient care.  I am billing for prolonged care for this day of service 3/25.        Diet: Soft & Bite Sized Diet (level 6) Thin Liquids (level 0)  Snacks/Supplements Adult: Ensure Enlive; Between Meals  Room Service    DVT Prophylaxis: Warfarin  Sawyer Catheter: Not present  Lines: None     Cardiac Monitoring: ACTIVE order. Indication: QTc prolonging medication (48 hours)  Code Status: No CPR- Do NOT Intubate      Clinically Significant Risk Factors              # Hypoalbuminemia: Lowest albumin = 3.4 g/dL at 3/13/2024  6:44 AM, will monitor as appropriate   # Thrombocytopenia: Lowest platelets = 101 in last 2 days, will monitor for bleeding   # Hypertension: Noted on problem list    # Acute heart failure with preserved ejection fraction: heart failure noted on problem list, last echo with EF >50%, and receiving IV diuretics      # DMII: A1C = 6.9 % (Ref range: <5.7 %) within past 6 months   # Overweight: Estimated body mass index is 29.14 kg/m  as calculated from the following:    Height as of this encounter: 1.905 m (6' 3\").    Weight as of this encounter: 105.7 kg (233 lb 1.6 oz).        # Financial/Environmental Concerns: none  # COPD: noted on problem list  # ICD device present       Disposition Plan             Elana Garcia MD  Hospitalist Service, GOLD TEAM 7  [facility]  Securely message with [employer] (more " info)  Text page via [facility] Paging/Directory   See signed in provider for up to date coverage information  ______________________________________________________________________    Interval History   The patient continues to report slight improvement in his shortness of breath.    At the bedside today, I worked with the RT and nursing staff to decrease the FiO2 requirements however the lowest we were able to safely get down to is FiO2 of 60%    Physical Exam   Vital Signs: Temp: 97.3  F (36.3  C) Temp src: Axillary BP: 96/67 Pulse: 85   Resp: 22 SpO2: 91 % O2 Device: High Flow Nasal Cannula (HFNC) Oxygen Delivery: 65 LPM  Weight: 233 lbs 1.6 oz    Constitutional: Awake, alert, cooperative, no apparent distress.  Eyes: Conjunctiva and pupils examined and normal.  HEENT: Moist mucous membranes, normal dentition.  Respiratory: Fine basilar rales are noted  Cardiovascular: Regular rate and rhythm, normal S1 and S2, and no murmur noted.  GI: Soft, non-distended, non-tender, normal bowel sounds.  Lymph/Hematologic: No anterior cervical or supraclavicular adenopathy.  Skin: No rashes, no cyanosis, no edema.  Musculoskeletal: No joint swelling, erythema or tenderness.  Neurologic: Cranial nerves 2-12 intact, normal strength and sensation.  Psychiatric: Alert, oriented to person, place and time, no obvious anxiety or depression.     Medical Decision Making       40 MINUTES SPENT BY ME on the date of service doing chart review, history, exam, documentation & further activities per the note.      Data     I have personally reviewed the following data over the past 24 hrs:    14.1 (H)  \   8.4 (L)   / 107 (L)     143 109 (H) 49.9 (H) /  107 (H)   3.7 23 1.88 (H) \     Procal: N/A CRP: N/A Lactic Acid: 2.0       INR:  2.68 (H) PTT:  N/A   D-dimer:  N/A Fibrinogen:  N/A       Imaging results reviewed over the past 24 hrs:   No results found for this or any previous visit (from the past 24 hour(s)).

## 2024-03-26 NOTE — PROVIDER NOTIFICATION
Time of notification: 1:55 AM  Provider notified: Fredy John  Patient status: Just fyi lactic is now down to 1.7.  Orders received: None.

## 2024-03-26 NOTE — PROGRESS NOTES
[facility]    Medicine Progress Note - Hospitalist Service, GOLD TEAM 7    Date of Admission:  3/9/2024    Assessment & Plan   78 yo male with hx of depression, HLD, HTN, CAD s/p CANDELARIA placement (9/2017), CKDIII, afib s/p cardioversions and ablations in 2011 (on warfarin PTA), ICM s/p initial PPM placement in 1999 followed by ICD placement (6/2014), and chronic hypoxic respiratory failure with BL supplemental O2 requirement of 6-8L 2/2 CPFE and group III pulm HTN admitted initially to [facility] 3/8-3/9 for acute on chronic hypoxic respiratory failure and CHRISSY, transferred to Cards II service ICU at [facility] for higher level of care 3/9, transferred to medicine 3/15.    Aspergillus PNA, POA  Acute on chronic hypoxic respiratory failure secondary to possible bacterial pneumonia and pulmonary fibrosis combined with emphysema/Gold D COPD and group 3 pulmonary hypertension, POA  The patient priorly completed 7 days course of levofloxacin on 3/19.  Prior infectious workup showed pansensitive Staphylococcus aureus dated 3/11.  COVID-19/influenza/RSV/respiratory virus panel/MRSA swab have all been done during this admission and have all been negative. Aspergillus was found on the patient sputum culture dated 3/23 and updated on 3/25 at 3 PM.  Identification of species and sensitivities are currently pending.  EKG with QTC of 478.   - ID consulted, appreciate assistance.   - Voriconazole at 6 mg/kg IV twice a day on day 1, followed by 4 mg/kg twice daily for seven days, then voriconazole oral dosing at 200 mg orally twice daily thereaft for 6-month.   - Level to be checked ~3/30.  - Stopped atorvastatin while undergoing voriconazole treatment.    -Continue Unasyn for total course of antibiotic therapy of 7 days, end date has been placed in the epic  -Continue taper of prednisone per recommendation of pulmonology -  40 x 5 days (started on 3/22), 20 x 5 days, 10 x 5 days, then  off    -Continue PPI while on steroids  -High flow nasal cannula in the morning and BiPAP at night targeting oxygen saturation of 88 to 92%  -Continue Mucomyst and albuterol every 4 hours, Breo Ellipta 1 puff daily, Incruse Ellipta 1 puff daily and albuterol every 4 hours as needed  -Appreciative to the pulmonology service, they are currently signed off    Acute on chronic heart failure with recovered LVEF 55% and moderately reduced right ventricular function  The patient had an episode of hypotension without symptoms within the last 24 hours secondary to increased urinary output in the setting of diuresis.  All diuretics were held on 3/23 and the patient received small volume resuscitation with 25% albumin at 25 g.  Blood pressure improved on 3/24. Lasix IV restarted on 3/25, switched to PO today 3/26. UOP on 3/25 was 1.7 L with net of - 1.1 L.   -Switched from furosemide 40 mg IV BID to 80 mg PO BID    -Continue acetazolamide at 125 mg daily  -Continue low-dose spironolactone at 12.5 mg daily   -Continue Jardiance 10 mg daily  -Ordered lymphedema consult for assistance with bilateral lower extremity edema    Paroxysmal atrial fibrillation, [id number] score of 3, POA   - Decrease metoprolol succinate to 6.25 mg daily given concerns for bradycardia without symptoms  - Continue anticoagulation with warfarin    Oral candidiasis, not clear if present on admission  -nystatin    Chronic medical problems:  # Hyperlipidemia and coronary artery disease,  aspirin 81 mg daily, currently holding statin  # Depression, continue Prozac 20 mg daily          Diet: Soft & Bite Sized Diet (level 6) Thin Liquids (level 0)  Snacks/Supplements Adult: Ensure Enlive; Between Meals  Room Service    DVT Prophylaxis: Warfarin  [place] Catheter: Not present  Lines: None     Cardiac Monitoring: ACTIVE order. Indication: QTc prolonging medication (48 hours)  Code Status: No CPR- Do NOT Intubate      Clinically Significant Risk Factors         "  # Hypocalcemia: Lowest Ca = 8.2 mg/dL in last 2 days, will monitor and replace as appropriate     # Hypoalbuminemia: Lowest albumin = 3.2 g/dL at 3/26/2024  5:55 AM, will monitor as appropriate   # Thrombocytopenia: Lowest platelets = 100 in last 2 days, will monitor for bleeding   # Hypertension: Noted on problem list    # Acute heart failure with preserved ejection fraction: heart failure noted on problem list, last echo with EF >50%, and receiving IV diuretics      # DMII: A1C = 6.9 % (Ref range: <5.7 %) within past 6 months   # Overweight: Estimated body mass index is 29.14 kg/m  as calculated from the following:    Height as of this encounter: 1.905 m (6' 3\").    Weight as of this encounter: 105.7 kg (233 lb 1.6 oz).        # Financial/Environmental Concerns: none  # COPD: noted on problem list  # ICD device present       Disposition Plan             Kiet Hughes  Hospitalist Service, [facility] TEAM [address] [place] [address]  Securely message with Vocera (more info)  Text page via [facility] Paging/Directory   See signed in provider for up to date coverage information        Physician Attestation   I, Jaycee Gomez MD, was present with the medical/OZ student who participated in the service and in the documentation of the note.  I have verified the history and personally performed the physical exam and medical decision making.  I agree with the assessment and plan of care as documented in the note.      Key findings:     Doing slightly better today per Buck. He thinks his breathing feels slightly better and is decreasing in O2 needs although still requiring HFNC. Denies other complaints - including chest pain. Hard of hearing. RN at bedside. Will treat for aspergillus. ID consult pending. Bipap overnight.     Please see A&P for additional details of medical decision making.  55 MINUTES SPENT BY ME on the date of service doing chart review, history, exam, documentation & further " activities per the note.    I have personally reviewed the following data over the past 24 hrs:    10.2  \   8.1 (L)   / 100 (L)     144 109 (H) 50.8 (H) /  112 (H)   3.6 23 1.88 (H) \     ALT: 31 AST: 25 AP: 37 (L) TBILI: 1.2   ALB: 3.2 (L) TOT PROTEIN: 5.0 (L) LIPASE: N/A     Procal: N/A CRP: N/A Lactic Acid: 1.7       INR:  2.58 (H) PTT:  N/A   D-dimer:  N/A Fibrinogen:  N/A         Jaycee Gomez MD  Date of Service (when I saw the patient): 03/26/24    ______________________________________________________________________    Interval History   The patient continues to report slight improvement in his shortness of breath.    Lactic acid increased to 2.5 triggering Rapid response but later decreased to 1.7.  LIMA. Sating >88% on BiPAP at 60-75%. On HFNC during the day at 45-50L at % FiO2. Blood-streaked sputum noted during oral suction  this morning.    Physical Exam   Vital Signs: Temp: 98  F (36.7  C) Temp src: Axillary BP: 101/78 Pulse: 79   Resp: 23 SpO2: 90 % O2 Device: BiPAP/CPAP Oxygen Delivery: 50 LPM  Weight: 233 lbs 1.6 oz    Constitutional: Awake, alert, no apparent distress, eating breakfast  HEENT: Hearing aids present  Respiratory: Fine basilar rales are noted, on HFNC  Cardiovascular: Regular rate and rhythm, normal S1 and S2, and no murmur noted.  GI: Soft, non-distended, non-tender, normal bowel sounds.  Skin: No rashes, no cyanosis, no edema.  Musculoskeletal: No joint swelling, erythema or tenderness.  Psychiatric: Alert, oriented to person, place and time, no obvious anxiety or depression.   LE: Bilateral edema present    Medical Decision Making

## 2024-03-26 NOTE — PLAN OF CARE
Goal Outcome Evaluation:        Neuro: A&Ox4. [place]  Cardiac: History of Afib, tele ordered because pulse ox reading pulse in the 30s. No drop in HR since on tele. Has occasional PACs  Respiratory: Sating >88% on  % FiO2  HFNC with 40-65L .Requires increase oxygen with activity. BiPAP at night.  GI/: Adequate urine output via urinal. BM X1  Diet/appetite: Tolerating soft- diet sized diet. Eating well.  Activity:  Assist of 1-2, up to chair and bedside commode  Pain: At acceptable level on current regimen.   Skin: No new deficits noted.  LDA's: R PIV x2, SL, CDI        Started new antibiotic for Aspergillus infection. Will continue to monitor and assess.

## 2024-03-26 NOTE — PROGRESS NOTES
Brief Medicine Note       Paged about lactate of 2.5, RRT called last night for lactate of 2.3, lactate trend has been quite tenuous. Discussed with nursing, no changes in vitals and patient is feeling well with no new symptoms. Already on Unasyn. Pt has been getting aggressively diuresed - diamox with lasix 100 mg today. Will encourage PO intake and recheck lactate, please reach out to team sooner with any new symptoms or changes in vital signs.       Abida Whittington PA-C

## 2024-03-26 NOTE — PROGRESS NOTES
03/26/24 1215   Appointment Info   Signing Clinician's Name / Credentials (PT) Britta Silva, PT, DPT   Rehab Comments (PT) [place], 8 L O2 at baseline, RT needed, PT only   Quick Adds   Quick Adds Edema Eval   General Information   Onset of Illness/Injury or Date of Surgery 03/25/24   Referring Physician Elana Garcia MD   Integumentary/Edema   Onset of Edema   (current hospital admission)   Affected Body Part(s) Right LE;Left LE   Etiology Comments hypervolemia, hypoalbuminemia   Edema Precautions Acute infection   Edema Precaution Comments acute on chronic HF, CHRISSY, respiratory status   General Comments/Previous Edema Treatment/Edema Equipment Pt reprots one episode of LE edema at prior hospital admission, has not worn compression socks previously.   Edema Examination/Assessment   Skin Condition Pitting;Intact;Dryness   Skin Condition Comments Pt has 2+ pitting edema in dorsum of feet and ankles bilaterally, 1+ pitting edema in gaiter area. Pt has  telangiectasias throughout feet/ankles, vericose veins in LEs, dtyness/skin sloughing on feet/toes and brittle/cracking toe nails. Noted bruising on medial heel bilaterally, L>R   Radial Pulse Symmetrical   Dorsal Pedal Pulse Symmetrical   Clinical Impression   Edema: Patient Presentation Edema   Edema: Planned Interventions Fit for compression garment;Gradient compression bandaging;Edema exercises;Manual therapy   PT Total Evaluation Time   PT Eval, Re-eval Minutes ([id number]) 4   Physical Therapy Goals   PT Frequency 5x/week   PT Goals Edema   PT: Edema education to increase ability to manage edema after discharge from the hospital Patient;Verbalize;Skin care routine;signs/symptoms of intolerance;wear schedule;limb positioning;garment/bandage care;discharge recommendations   PT: Management of edema bandages Patient;Caregiver;Demonstrate;[place];garment(s)   Total Session Time   Total Session Time (sum of timed and untimed services) 4

## 2024-03-26 NOTE — PLAN OF CARE
Neuro: A&Ox4.   Cardiac: A-Fib w/ frequent ectopies. Soft Bps 90s-100s  Respiratory: Tachypneic. LIMA. Sating >88% on BiPAP at 60-75%. On HFNC during the day at 45-50L at % FiO2. Blood-streaked sputum noted during oral suction.  GI/: Adequate urine output via urinal. Smear BM X1  Diet/appetite: Tolerating soft & bite-sized diet. Eating well.  Activity:  Assist of 1 w/walker and GB, up to chair and in halls.  Pain: At acceptable level on current regimen.   Skin: No new deficits noted.  LDA's: R PIV x 2, TKO and SL.     Plan: Continue with POC. Notify primary team with changes.

## 2024-03-26 NOTE — CONSULTS
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, COPD - Gold E, combined pulmonary fibrosis and emphysema, pulmonary HTN  Cardiac disease: CAD s/p CANDELARIA x3 to LAD, persistent AF s/p multiple cardioversions and ablations (2011) on coumadin, ischemic cardiomyopathy, s/p dual chamber iCD (6/2014)  CKD, Baseline Cr 1.8    DISCUSSION:   Buck Sinclair is a 79 year old male with history of 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     Afebrile since arrival with leukocytosis in the 11-14K range, CRP neg, procalcitonin neg (both neg makes likelihood of bacterial infection very low). Initial CT c/w bronchopneumonia with bronchiectasis and mucosal thickening as well as patchy opacities. VQ scan without evidence of PE. Currently on steroid taper for possible interstitial pneumonia component with autoimmune features- not having significant improvement. Has persistently required O2 via HFNC or BiPAP without significant improvement despite course of levofloxacin with 1-3 days of other antibiotics given through stay. Early sputum culture on 3/11  with MSSA. Respiratory panel PCR, COVID/flu/RSV negative. Repeat sputum culture 3/23 with Aspergillus species and candida.     Unclear significance of sole positive culture with Aspergillus- may represent superficial colonization or contamination. Voriconazole was started after Aspergillus returned.  Patient is DNR/DNI and not stable for bronchoscopy so not able to get better sample for culture at this time.     Hx cavitary lung lesion and hemoptysis in 2017- AFB cultures negative at the time. No record of QuantGold, will order now for risk stratification- no known TB exposures. Stationed in [place] while in - Melioidosis also considered as it has been associated with latent period, no B.pseudomallei isolated from cultures. Does have risk factors for endemic fungal infection- sending expanded fungal work up as outlined below.     RECOMMENDATION:  Change voriconazole 200mg BID to PO form - will continue while awaiting additional work up  Ordered for you: BD glucan, aspergillus galactomannan, histo urine ag, blastomyces serum ag, coccidioides ag, QuantGold  May need to re-engage Pulmonology if not improving in next couple of days      Thank you for this consult. ID will continue to follow.       Annamarie Juarez PA-C  Pronouns: she/her/hers  Infectious Diseases  Contact via [employer] or [place] Paging/Directory    90 MINUTES SPENT BY ME on the date of service doing chart review, history, exam, documentation & further activities per the note.       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     Since admission has had ongoing respiratory failure and consistently been on HFNC and BiPAP. Seen by pulmonology and started on steroid taper for interstitial pneumonia with autoimmune features. Thus far, sputum cultures with  MSSA (3/11) and Aspergillus and Candida (3/23).     Feels short of breath at all times with associated cough. Sputum production is more frequent in the mornings, +hemoptysis with blood streaked sputum. No nasal congestion, sore throat, fever, chills, night sweats, joint swelling, diarrhea.    Hx of cavitary lung lesions in 10/2017 - negative AFB x3, histo and blasto antigens and antibodies, legionella PCR.     Exposure history: Lives in [place], [place] with wife. No family history of tuberculosis or known TB exposure. Retired . 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 No recent travel outside of [place]. Has traveled to the [facility] ([place], [place], [place]) >10 years ago, no travel related illness. Spends a lot of time at [employer] [place], [place] in summer. Previously did a lot of outdoor work, work in garage.      Antimicrobials  Current:   - Unasyn (3/24-present)  - Voriconazole (3/25-present)    Prior:  - ceftriaxone (3/11-3/12)  - Vancomycin (3/11-3/12)  - Doxycycline (3/12-3/13)  - Levofloxacin (3/13-3/19)  - Zosyn (3/16-3/17; 3/22-3/24)  - Augmentin (3/15)         Past Medical History:     Past Medical History:   Diagnosis Date    Anemia     Asthma without status asthmaticus 05/05/2021    Atrial fibrillation (H)     BPH (benign prostatic hyperplasia)     Cardiomyopathy (H)     CKD (chronic kidney disease) stage 3, GFR 30-59  ml/min (H) 05/24/2023    Congestive heart failure (H)     COPD, group B, by GOLD 2017 classification (H)     Coronary artery disease due to calcified coronary lesion     Dyslipidemia, goal LDL below 70     Essential hypertension     Heart failure with reduced ejection fraction (H) 08/17/2023    Hemoptysis 10/04/2017    History of transfusion     Hyperlipidemia     Persistent atrial fibrillation (H)     Pneumonia of left lower lobe due to infectious organism 10/04/2017    Pulmonary hypertension (H)     Skin cancer of trunk     Status post catheter ablation of atrial fibrillation 06/07/2017    PVI 4-2011 (Cryo/PVI + roof line + CTI line) Re-do PVI 7-2011 (RFA/PVI + CFE + VIDYA + confirmed CTI line)    Ventricular tachycardia (H)             Past Surgical History:     Past Surgical History:   Procedure Laterality Date    CARDIAC DEFIBRILLATOR PLACEMENT      CARDIOVERSION  07/11/2018    x20, last 2/12/15, 10/2015, 11/18/16, 6/16/17 by Lauren Foster, RAJESH    CARDIOVERSION  07/11/2018    CARDIOVERSION  11/19/2021    COLONOSCOPY N/A 04/28/2017    Procedure: COLONOSCOPY with 2 ascending polyps and 1 transverse polyp;  Surgeon: Jose Whittington MD;  Location: [facility] GI;  Service:     CORONARY ANGIOGRAPHY ADULT ORDER      CV CORONARY ANGIOGRAM N/A 09/20/2017    Procedure: Coronary Angiogram;  Surgeon: Sergio Cervantes MD;  Location: [facility] Cath Lab;  Service:     CV CORONARY ANGIOGRAM N/A 01/24/2022    Procedure: Coronary Angiogram;  Surgeon: Christi Saunders MD;  Location: [facility] CATH LAB CV    CV LEFT HEART CATH N/A 01/24/2022    Procedure: Left Heart Cath;  Surgeon: Christi Saunders MD;  Location: [facility] CATH LAB CV    CV RIGHT AND LEFT HEART CATH N/A 01/24/2022    Procedure: Right and Left Heart Catherization;  Surgeon: Christi Saunders MD;  Location: [facility] CATH LAB CV    CV RIGHT HEART CATH MEASUREMENTS RECORDED N/A 3/12/2024    Procedure: Heart Cath Right Heart Cath;  Surgeon: Edgardo Deluna  MD Diogenes;  Location:  HEART CARDIAC CATH LAB    EP ICD GENERATOR REPLACEMENT DUAL N/A 10/28/2022    Procedure: Implantable Cardioverter Defibrillator Generator Replacement Dual;  Surgeon: Elo Collins MD;  Location: [facility] CATH LAB CV    EP ICD INSERT      FRACTURE SURGERY Left     wrist    HEART CATH, ANGIOPLASTY      IMPLANT AUTOMATIC IMPLANTABLE CARDIOVERTER DEFIBRILLATOR      INGUINAL HERNIA REPAIR Left     while in the [employer] in [place] after 13 month in [place]    INSERT / REPLACE / REMOVE PACEMAKER      IR MISCELLANEOUS PROCEDURE  2014    OTHER SURGICAL HISTORY      left hand surgery---tendon repair    CO ABLATE HEART DYSRHYTHM FOCUS  2011    Catheter Ablation Atrial Fibrillation PVI 2011 (Cryo+RF-PVI + roof line + CTI line)    CO ABLATE HEART DYSRHYTHM FOCUS  2011    Re-do PVI 2011 (RFA-PVI + CFE + VIDYA + confirmation of CTI line)    TOTAL SHOULDER REPLACEMENT Right 2016    Dr. Abernathy of [facility]    WRIST SURGERY Left     ZZC MYERS W/O FACETEC FORAMOT/DSKC  VRT SEG, CERVICAL      Laminectomy Lumbar;  Recorded: 2012;            Family History:   Reviewed and non-contributory.   Family History   Problem Relation Age of Onset    Cancer Mother         leukemia    Cancer Father         bladder    Cancer Sister         breast with lung met.    Aneurysm Sister     CABG Brother     CABG Brother     Valvular heart disease Brother         valve replacement            Social History:     Social History     Tobacco Use    Smoking status: Former     Packs/day: 1.00     Years: 4.00     Additional pack years: 0.00     Total pack years: 4.00     Types: Cigarettes     Quit date: 1968     Years since quittin.2    Smokeless tobacco: Never   Substance Use Topics    Alcohol use: Yes     Alcohol/week: 2.0 standard drinks of alcohol     Comment: Alcoholic Drinks/day: 1 beer per week     History   Sexual Activity    Sexual activity: Yes    Partners:  Female    Birth control/ protection: Post-menopausal            Current Medications:      acetaZOLAMIDE  125 mg Oral Daily    acetylcysteine  2 mL Nebulization Q4H    And    albuterol  2.5 mg Nebulization Q4H    ampicillin-sulbactam  3 g Intravenous Q6H    aspirin  81 mg Oral Daily    empagliflozin  10 mg Oral Daily    FLUoxetine  20 mg Oral Daily    fluticasone-vilanterol  1 puff Inhalation Daily    furosemide  40 mg Intravenous BID    metoprolol succinate ER  6.25 mg Oral Daily    nystatin  1,000,000 Units Oral 4x Daily    pantoprazole  40 mg Oral BID    polyethylene glycol  17 g Oral BID    [START ON 3/27/2024] predniSONE  20 mg Oral Daily    Followed by    [START ON 4/1/2024] predniSONE  10 mg Oral Daily    sodium chloride (PF)  3 mL Intracatheter Q8H    spironolactone  12.5 mg Oral Daily    thiamine  100 mg Oral Daily    umeclidinium  1 puff Inhalation Daily    vitamin D2  50,000 Units Oral Once per day on Monday Thursday    voriconazole  4 mg/kg (Dosing Weight) Intravenous Q12H    [START ON 4/2/2024] voriconazole  200 mg Oral Q12H [facility] (08/20)    Warfarin Therapy Reminder  1 each Oral See Admin Instructions            Allergies:     Allergies   Allergen Reactions    Adhesive Tape Other (See Comments)     ADHESIVE TAPE; SKIN IRRITATION; Skin pulled off with foam tape      Amiodarone      ADVERSE REACTION.  Sunlight sensitivity.    Lisinopril             Physical Exam:   Vitals were reviewed  Patient Vitals for the past 24 hrs:   BP Temp Temp src Pulse Resp SpO2 Weight   03/26/24 0814 103/71 97.9  F (36.6  C) Oral 90 22 90 % --   03/26/24 0630 101/78 -- -- -- -- 90 % --   03/26/24 0440 93/62 98  F (36.7  C) Axillary 79 23 90 % --   03/26/24 0200 -- -- -- -- -- 95 % --   03/26/24 0030 -- -- -- -- -- 97 % --   03/25/24 2335 107/70 -- -- 85 26 90 % --   03/25/24 2301 92/59 98.1  F (36.7  C) Axillary 81 28 94 % --   03/25/24 2223 104/68 98.6  F (37  C) Axillary 88 23 95 % --   03/25/24 2205 94/62 97.6  F (36.4  C)  Axillary 89 24 96 % --   03/25/24 2153 -- -- -- -- -- 97 % --   03/25/24 2050 -- -- -- -- -- 90 % --   03/25/24 2021 93/68 97.9  F (36.6  C) Oral 82 24 96 % --   03/25/24 1538 98/71 98.2  F (36.8  C) Oral 89 18 (!) 87 % --   03/25/24 1452 111/69 -- -- -- -- -- --   03/25/24 1320 -- -- -- -- -- -- 105.7 kg (233 lb 1.6 oz)   03/25/24 1300 -- -- -- -- -- (!) 88 % --   03/25/24 1200 -- -- -- -- -- 91 % --   03/25/24 1120 96/67 97.3  F (36.3  C) Axillary 85 22 94 % --       Physical Examination:  GENERAL:  Sitting up in chair. On HFNC. Appears uncomfortable. Hard of hearing.  HEENT:  Head is normocephalic, atraumatic   EYES:  Eyes have anicteric sclerae without conjunctival injection or discharge   ENT:  Oropharynx is dry.   LUNGS:  Fine bibasilar crackles, diminished airflow throughout. No wheeze or rhonchi.   CARDIOVASCULAR:  RRR, +S1/S2, no murmur appreciated. + LE edema bilaterally.  ABDOMEN:  Soft, nondistended.  SKIN:  No acute rashes. +ecchymosis on upper extremities. PIV in place without any surrounding erythema or exudate. No stigmata of endocarditis.  NEUROLOGIC:  Grossly nonfocal. Active x4 extremities         Laboratory Data:     Inflammatory Markers    Recent Labs   Lab Test 03/24/24  0439 03/19/24  0548 03/16/24 2034 03/14/24  0622 03/11/24  1028 03/11/24  0617   SED  --   --   --   --  56*  --    CRPI <3.00 <3.00 8.47* 19.30*  --  88.40*       Hematology Studies    Recent Labs   Lab Test 03/26/24  0555 03/25/24  0541 03/24/24  0439 03/23/24 0317 03/22/24  0431 03/21/24 0448   WBC 10.2 14.1* 12.9* 14.4* 16.5* 13.4*   HGB 8.1* 8.4* 8.3* 8.9* 9.3* 9.5*   * 104* 104* 103* 103* 101*   * 107* 101* 121* 131* 125*       Metabolic Studies     Recent Labs   Lab Test 03/26/24  0555 03/25/24  0541 03/24/24  0439 03/23/24 0317 03/22/24 0431    143 140 141 137   POTASSIUM 3.6 3.7 3.8 4.0 4.2   CHLORIDE 109* 109* 108* 106 106   CO2 23 23 22 23 24   BUN 50.8* 49.9* 49.9* 51.6* 51.1*   CR 1.88*  "1.88* 1.88* 1.84* 1.73*   GFRESTIMATED 36* 36* 36* 37* 40*       Hepatic Studies    Recent Labs   Lab Test 03/26/24  0555 03/13/24  0644 03/11/24  0617 03/09/24  2315 05/20/23  0427 05/19/23  0446 05/18/23  0855   BILITOTAL 1.2 1.2 1.8* 1.7*  --  1.0 1.0   ALKPHOS 37* 61 66 77  --  56 69   ALBUMIN 3.2* 3.4* 3.9 4.3 3.3* 3.3* 3.7   AST 25 30 32 33  --  24 24   ALT 31 17 13 13  --  15 15       Microbiology:  Culture   Date Value Ref Range Status   03/23/2024 2+ Candida albicans (A)  Preliminary     Comment:     Susceptibilities not routinely done, refer to antibiogram to view typical susceptibility profiles   03/23/2024 1+ Aspergillus species (A)  Preliminary     Comment:     Speciation in progress   03/22/2024 No growth after 3 days  Preliminary   03/22/2024 No growth after 3 days  Preliminary   03/21/2024 No growth after 4 days  Preliminary   03/21/2024 No growth after 4 days  Preliminary   03/16/2024 No Growth  Final   03/16/2024 No Growth  Final   03/11/2024 No Growth  Final   03/11/2024 No Growth  Final   03/11/2024 3+ Staphylococcus aureus (A)  Final   03/11/2024 2+ Normal godwin  Final   05/18/2023 No Growth  Final   05/18/2023 No Growth  Final   10/19/2021 No Growth  Final       Urine Studies    Recent Labs   Lab Test 03/16/24 2059 03/08/24  1541   LEUKEST Negative Negative   WBCU 0 <1       Vancomycin Levels  No lab results found.    Invalid input(s): \"VANCO\"    Hepatitis B Testing   Recent Labs   Lab Test 03/10/24  0623 02/15/22  0941   HBCAB Nonreactive Nonreactive   HEPBANG  --  Nonreactive   HBEAGN Negative  --      Hepatitis C Testing     Hepatitis C Antibody   Date Value Ref Range Status   03/09/2024 Nonreactive Nonreactive Final     Comment:     A nonreactive screening test result does not exclude the possibility of exposure to or infection with HCV. Nonreactive screening test results in individuals with prior exposure to HCV may be due to antibody levels below the limit of detection of this assay or " lack of reactivity to the HCV antigens used in this assay. Patients with recent HCV infections (<3 months from time of exposure) may have false-negative HCV antibody results due to the time needed for seroconversion (average of 8 to 9 weeks).   02/15/2022 Nonreactive Nonreactive Final           Imaging:     US lower extremity (3/22/24)  IMPRESSION: No deep venous thrombosis demonstrated in either leg.     CXR (3/22/24)  IMPRESSION:   Cardiomegaly with slightly worsened bibasilar interstitial opacities  likely representing a combination of chronic fibrosis and atelectasis  and/or edema.    NM Lung Scan VQ (3/22/24)  Impression:  1. Pulmonary emboli is not present.
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
## 2024-03-26 NOTE — PLAN OF CARE
Goal Outcome Evaluation:           Neuro: A&Ox4. [place]  Cardiac: History of Afib, VSS. Tele discontinued   Respiratory: Sating >88% on  % FiO2  HFNC with 55-65L .Requires increase oxygen with activity. BiPAP at night. Had the oxymask at 15L over high flow for a hour or so this morning for increased oxygen needs. ABG ordered to ensure pt is oxygenating well  GI/: Adequate urine output via urinal. Good output  Diet/appetite: Tolerating soft- diet sized diet. Eating well.  Activity:  Assist of 1-2, up to chair and bedside commode  Pain: At acceptable level on current regimen.   Skin: No new deficits noted.  LDA's: R PIV x2, SL, CDI       Will continue to monitor and assess

## 2024-03-27 NOTE — PROGRESS NOTES
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, COPD - Gold E, combined pulmonary fibrosis and emphysema, pulmonary HTN  Cardiac disease: CAD s/p CANDELARIA x3 to LAD, persistent AF s/p multiple cardioversions and ablations (2011) on coumadin, ischemic cardiomyopathy, s/p dual chamber iCD (6/2014)  CKD, Baseline Cr 1.8    Recommendations:  Continue voriconazole 200mg PO BID  - Vori level check weekly, due on: 4/2  Pending: BD glucan, aspergillus galactomannan, histo urine Ag, blastomyces serum ag, coccidioides ag, Quantgold  Please send sputum cultures - bacterial, fungal and AFB (considering NTM)   May need to re-engage pulmonology if failing to improve in next 1-2 days    Discussion:  Bukc Sinclair is a 79 year old male with history of 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      Afebrile since arrival with leukocytosis in the 11-14K range, CRP neg, procalcitonin neg (both neg makes likelihood of bacterial infection very low). Initial CT c/w bronchopneumonia with bronchiectasis and mucosal thickening as well as patchy opacities. VQ scan without evidence of PE.  Currently on steroid taper for possible interstitial pneumonia component with autoimmune features- not having significant improvement. Has persistently required O2 via HFNC or BiPAP without significant improvement despite course of levofloxacin with 1-3 days of other antibiotics given through stay.  Initial sputum culture on 3/11 with MSSA. Respiratory panel PCR, COVID/flu/RSV negative. Repeat sputum culture 3/23 with Aspergillus species and candida. Hx cavitary lung lesion and hemoptysis in 2017- AFB cultures negative at the time. QuantGold pending. At risk for endemic fungal or NTM pulmonary infection with environmental exposures and underlying lung disease.     Unclear significance of sole positive culture with Aspergillus- may represent superficial colonization or contamination. Voriconazole was started after Aspergillus returned.  Patient is DNR/DNI and not stable for bronchoscopy so not able to get better sample for culture at this time. Will send repeat sputum cultures to see if it is isolated again. It is too soon for response/clearance as it typically takes voriconazole about a week to build up to therapeutic level.       Recs were discussed with primary team today. Don't hesitate to call with questions.     Attestation:  I have reviewed today's vital signs, medications, labs and imaging.    Annamarie Juarez PA-C  Pronouns: she/her/hers  Infectious Diseases  Contact via [employer] or [employer] Paging/Directory        55 MINUTES SPENT BY ME on the date of service doing chart review, history, exam, documentation & further activities per the note.             Interval History:       Still short of breath on high flow. Has been able to come down a bit on flow rate since early this AM intially 70LPM --->55LPM (recent baseline) but on 100% FiO2. No new fevers, chills, nausea, vomiting, diarrhea.         Current Antimicrobials   Current:  - Unasyn (3/24-present)  - Voriconazole (3/25-present)     Prior:  - ceftriaxone  (3/11-3/12)  - Vancomycin (3/11-3/12)  - Doxycycline (3/12-3/13)  - Levofloxacin (3/13-3/19)  - Zosyn (3/16-3/17; 3/22-3/24)  - Augmentin (3/15)         Physical Exam:   Ranges for vital signs:  Temp:  [97.5  F (36.4  C)-98.3  F (36.8  C)] 97.8  F (36.6  C)  Pulse:  [80-96] 96  Resp:  [16-24] 20  BP: ()/(57-84) 91/63  Cuff Mean (mmHg):  [76-80] 80  FiO2 (%):  [70 %-100 %] 100 %  SpO2:  [86 %-98 %] 98 %    Intake/Output Summary (Last 24 hours) at 3/27/2024 0937  Last data filed at 3/27/2024 0338  Gross per 24 hour   Intake 900 ml   Output 1550 ml   Net -650 ml     Exam:  GENERAL:  awake, alert, interactive. Sitting up in chair.   ENT:  Head is normocephalic, atraumatic. Oropharynx is moist without exudates or ulcers.  EYES:  Eyes have anicteric sclerae, noninjected conjunctivae.    LUNGS:  Improved air movement compared to previous, +fine bibasilar crackles. No rhonchi or wheeze. Using incentive spirometer at start of visit.   CARDIOVASCULAR:  RRR, +S1/S2, no murmur appreciated.  ABDOMEN:  soft, nondistended  SKIN:  No acute rashes. +upper extremity ecchymosis. Line is in place without any surrounding erythema.  NEUROLOGIC:  Grossly nonfocal.         Laboratory Data:   Reviewed.  Pertinent for:    Culture data:  Culture   Date Value Ref Range Status   03/23/2024 2+ Candida albicans (A)  Preliminary     Comment:     Susceptibilities not routinely done, refer to antibiogram to view typical susceptibility profiles   03/23/2024 1+ Aspergillus species (A)  Preliminary     Comment:     Speciation in progress   03/22/2024 No growth after 4 days  Preliminary   03/22/2024 No growth after 4 days  Preliminary   03/21/2024 No Growth  Final   03/21/2024 No Growth  Final   03/16/2024 No Growth  Final   03/16/2024 No Growth  Final   03/11/2024 No Growth  Final   03/11/2024 No Growth  Final   03/11/2024 3+ Staphylococcus aureus (A)  Final   03/11/2024 2+ Normal godwin  Final   05/18/2023 No Growth  Final   05/18/2023 No Growth   Final   10/19/2021 No Growth  Final       Inflammatory Markers    Recent Labs   Lab Test 03/24/24  0439 03/19/24  0548 03/16/24 2034 03/14/24  0622 03/11/24  1028   SED  --   --   --   --  56*   CRPI <3.00 <3.00 8.47* 19.30*  --        Hematology Studies    Recent Labs   Lab Test 03/27/24  0452 03/26/24  0555 03/25/24  0541 03/24/24  0439   WBC 8.5 10.2 14.1* 12.9*   HGB 8.0* 8.1* 8.4* 8.3*   * 103* 104* 104*   PLT 93* 100* 107* 101*     No lab results found.    Metabolic Studies     Recent Labs   Lab Test 03/27/24 0452 03/26/24  0555 03/25/24  0541 03/24/24  0439    144 143 140   POTASSIUM 3.3* 3.6 3.7 3.8   CHLORIDE 109* 109* 109* 108*   CO2 23 23 23 22   BUN 49.9* 50.8* 49.9* 49.9*   CR 1.95* 1.88* 1.88* 1.88*   GFRESTIMATED 34* 36* 36* 36*       Hepatic Studies    Recent Labs   Lab Test 03/26/24  0555 03/13/24  0644 03/11/24  0617   BILITOTAL 1.2 1.2 1.8*   ALKPHOS 37* 61 66   ALBUMIN 3.2* 3.4* 3.9   AST 25 30 32   ALT 31 17 13            Imaging:   US lower extremity (3/22/24)  IMPRESSION: No deep venous thrombosis demonstrated in either leg.      CXR (3/22/24)  IMPRESSION:   Cardiomegaly with slightly worsened bibasilar interstitial opacities  likely representing a combination of chronic fibrosis and atelectasis  and/or edema.     NM Lung Scan VQ (3/22/24)  Impression:  1. Pulmonary emboli is not present.

## 2024-03-27 NOTE — PLAN OF CARE
Neuro: A&Ox 4, [place].    Cardiac: No tele. HR 70's. BP 90's/60's. Afebrile.    Respiratory: Sating >88% on BiPAP at 70-90% FiO2 overnight. On HFNC during the day on 40-60L and % FiO2.  Patient desats quickly with movement.  GI/: Adequate urine output via urinal. BM X1 on bedside commode.   Diet/appetite: Tolerating soft and bite sized diet with thin liquids. No N/V.  Activity:  Assist of 1 with GB and walker.  Pain: Patient reports no pain.   Skin: No new deficits noted.   LDA's: 2 L PIV's.     Plan: Continue with POC. Notify primary team with changes.

## 2024-03-27 NOTE — PROGRESS NOTES
[facility]    Medicine Progress Note - Hospitalist Service, GOLD TEAM 7    Date of Admission:  3/9/2024    Assessment & Plan   78 yo male with hx of depression, HLD, HTN, CAD s/p CANDELARIA placement (9/2017), CKDIII, afib s/p cardioversions and ablations in 2011 (on warfarin PTA), ICM s/p initial PPM placement in 1999 followed by ICD placement (6/2014), and chronic hypoxic respiratory failure with BL supplemental O2 requirement of 6-8L 2/2 CPFE and group III pulm HTN admitted initially to [facility] 3/8-3/9 for acute on chronic hypoxic respiratory failure and CHRISSY, transferred to Cards II service ICU at [facility] for higher level of care 3/9, transferred to medicine 3/15.    Changes today:   - Discussion with wife, Neela, at bedside regarding status of Salvador's respiratory failure. Concerned that he is not making significant improvement   - Sputum culture pending with AFB (Non-TB), fungal, bacterial   - CXR   - Will reopen discussion with pulmonary team who previously signed off given concern that no treatable cause has been found.    - Transition voriconazole to PO   - Continue prednisone taper    Aspergillus PNA, POA  Acute on chronic hypoxic respiratory failure secondary to possible bacterial pneumonia and pulmonary fibrosis combined with emphysema/Gold D COPD and group 3 pulmonary hypertension, POA  The patient priorly completed 7 days course of levofloxacin on 3/19.  Prior infectious workup showed pansensitive Staphylococcus aureus dated 3/11.  COVID-19/influenza/RSV/respiratory virus panel/MRSA swab have all been done during this admission and have all been negative. Aspergillus was found on the patient sputum culture dated 3/23 and updated on 3/25 at 3 PM.  Identification of species and sensitivities are currently pending.  EKG with QTC of 478.   - ID consulted, appreciate assistance.   - switch to voriconazole 200 mg BID PO   - BD glycan, aspergillus galactomannan,  histo urine ag, blastomyces serum ag, coccidioides ag, QuantiGold    - Consider reconsulting Pulm if not improving   - Sputum cultures - bacterial, fungal and NTM AFB     - Fungal or yeast culture  - Stopped atorvastatin while undergoing voriconazole treatment.    -Continue Unasyn for total course of antibiotic therapy of 7 days, end date has been placed in the epic  -Continue taper of prednisone per recommendation of pulmonology -  20 x 5 days (started on 3/27), 10 x 5 days, then off    -Continue PPI while on steroids  -High flow nasal cannula in the morning and BiPAP at night targeting oxygen saturation of 88 to 92%  -Continue Mucomyst and albuterol every 4 hours, Breo Ellipta 1 puff daily, Incruse Ellipta 1 puff daily and albuterol every 4 hours as needed  -Appreciative to the pulmonology service, they are currently signed off    Acute on chronic heart failure with recovered LVEF 55% and moderately reduced right ventricular function  The patient had an episode of hypotension without symptoms within the last 24 hours secondary to increased urinary output in the setting of diuresis.  All diuretics were held on 3/23 and the patient received small volume resuscitation with 25% albumin at 25 g.  Blood pressure improved on 3/24. Lasix IV restarted on 3/25, switched to PO today 3/26. UOP on 3/25 was 1.7 L with net of - 1.1 L. UOP on 3/26 2.2 L. His weight has fluctuated  from 233 to 231 and 234 lb for the last 3 days.  -Switched from furosemide 40 mg IV BID to 80 mg PO BID    -Continue acetazolamide at 125 mg daily  -Continue low-dose spironolactone at 12.5 mg daily   -Continue Jardiance 10 mg daily  -Ordered lymphedema consult for assistance with bilateral lower extremity edema    Anemia  Hgb has trended down from 13 since admission to 8.0 today 3/27. Continue to monitor  CBC am daily    Paroxysmal atrial fibrillation, [id number] score of 3, POA   - Decrease metoprolol succinate to 6.25 mg daily given concerns for  "bradycardia without symptoms  - Continue anticoagulation with warfarin    Oral candidiasis, not clear if present on admission  -nystatin    Chronic medical problems:  # Hyperlipidemia and coronary artery disease,  aspirin 81 mg daily, currently holding statin  # Depression, continue Prozac 20 mg daily          Diet: Soft & Bite Sized Diet (level 6) Thin Liquids (level 0)  Room Service  Snacks/Supplements Adult: Other; Allow supplements PRN only, if requested. Please do not send automatically anymore.; Between Meals    DVT Prophylaxis: Warfarin  Sawyer Catheter: Not present  Lines: None     Cardiac Monitoring: None  Code Status: No CPR- Do NOT Intubate      Clinically Significant Risk Factors        # Hypokalemia: Lowest K = 3.3 mmol/L in last 2 days, will replace as needed   # Hypocalcemia: Lowest Ca = 8.3 mg/dL in last 2 days, will monitor and replace as appropriate     # Hypoalbuminemia: Lowest albumin = 3.2 g/dL at 3/26/2024  5:55 AM, will monitor as appropriate   # Thrombocytopenia: Lowest platelets = 93 in last 2 days, will monitor for bleeding   # Hypertension: Noted on problem list    # Acute heart failure with preserved ejection fraction: heart failure noted on problem list, last echo with EF >50%, and receiving IV diuretics      # DMII: A1C = 6.9 % (Ref range: <5.7 %) within past 6 months   # Overweight: Estimated body mass index is 29.35 kg/m  as calculated from the following:    Height as of this encounter: 1.905 m (6' 3\").    Weight as of this encounter: 106.5 kg (234 lb 12.6 oz).        # Financial/Environmental Concerns: none  # COPD: noted on problem list  # ICD device present       Disposition Plan             Kiet Hughes  Hospitalist Service, GOLD TEAM [address] [place] [address]  Securely message with [employer] (more info)  Text page via [employer] Paging/Directory   See signed in provider for up to date coverage information        Physician Attestation   Jaycee HEREDIA " MD Patricia, was present with the medical/OZ student who participated in the service and in the documentation of the note.  I have verified the history and personally performed the physical exam and medical decision making.  I agree with the assessment and plan of care as documented in the note.      Key findings:   Feeling okay today. Feels breathing is not better but not worse. No chest pain. Wife, Neela, at bedside present for conversation about quality of life and respiratory status. Continue on antimicrobials, steroids, diuretics. Will reinvolved pulm due to concern for this being ongoing end stage COPD.     Please see A&P for additional details of medical decision making.  65 MINUTES SPENT BY ME on the date of service doing chart review, history, exam, documentation & further activities per the note.    I have personally reviewed the following data over the past 24 hrs:    8.5  \   8.0 (L)   / 93 (L)     145 109 (H) 49.9 (H) /  123 (H)   3.3 (L) 23 1.95 (H) \     INR:  2.76 (H) PTT:  N/A   D-dimer:  N/A Fibrinogen:  N/A         Jaycee Gomez MD  Date of Service (when I saw the patient): 03/26/24    ______________________________________________________________________    Interval History   Sating >88% on BiPAP at 70-90% FiO2 overnight. Patient desats quickly with activity. Doing okay today, afebrile. Wife at bedside. No chest pain or nausea.     Physical Exam   Vital Signs: Temp: 97.6  F (36.4  C) Temp src: Axillary BP: 110/84 Pulse: 80   Resp: 16 SpO2: (!) 86 % O2 Device: BiPAP/CPAP Oxygen Delivery: 60 LPM  Weight: 234 lbs 12.64 oz    Constitutional: Awake, alert, no apparent distress, eating breakfast  HEENT: Hearing aids present  Respiratory: Fine basilar rales are noted, on HFNC, coarse right sided basilar crackles which cleared with incentive spirometer.   Cardiovascular: Regular rate and rhythm, normal S1 and S2, and no murmur noted.  GI: Soft, non-distended, non-tender, normal bowel sounds.  Skin: No  rashes, no cyanosis, no edema.  Musculoskeletal: No joint swelling, erythema or tenderness.  Psychiatric: Alert, oriented to person, place and time, no obvious anxiety or depression.   LE: Bilateral edema present

## 2024-03-27 NOTE — PLAN OF CARE
Neuro: A&Ox4.   Cardiac: Not on tele, denies chest pain.  Respiratory: HFNC at Fi02 100% with 55lpm  flow, sating 88-95%.  Bipap when taking small naps at Fi02 60-65% sating 88-95%.  GI/: Additional order of lasix given per order, adequate urine output.  No BM this shift.   Diet/appetite: Tolerating soft bite sized diet. Eating well.  Activity:  Assist of 1, up to chair and with transfers.  Pain: At acceptable level on current regimen.   Skin: No new deficits noted.  LDA's:  2 Left PIV, infusing and saline  lock.    Potassium replaced today, recheck this afternoon is 4.4, no further replacement needed, recheck in AM draw.      1249:  Provider notification: Bloody sputum, with orders, sample sent 3x.      Plan: Continue with POC. Notify primary team with changes.

## 2024-03-28 NOTE — CONSULTS
"[facility] Pulmonology Follow up    Buck Sinclair  MRN: [id number]  : 1945    Date of Admission: 3/9/2024  Date of Service: 2024    Interval  -- remains on high FiO2 (0.5-1.0)  -- currently down to 20mg prednisone daily  -- found to have 1+ Aspergillus fumigatus in sputum from 3/23, for which he's now being treated with voriconazole  -- actually feeling better than when we last saw him a week ago - less short of breath    Review of Systems  Focused review of systems completed and negative except as noted above in HPI.    Objective  /72   Pulse 79   Temp 97.4  F (36.3  C) (Oral)   Resp 27   Ht 1.905 m (6' 3\")   Wt 104.2 kg (229 lb 11.2 oz)   SpO2 93%   BMI 28.71 kg/m      Gen: in no acute distress, sitting upright in chair  HEENT: MMM  CV: RRR, extremities warm, 1-2+ peripheral edema  Pulm: mildly increased work of breathing on bilevel, normal chest excursion, bibasilar rales  MSK: no gross deformities, no joint swelling  Integ: no rashes or lesions appreciated  Neuro: speech clear, alert and grossly oriented. [place].    Data  I have personally reviewed new labs, imaging.     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 Buck Sinclair is a 79-year-old man with medical history notable for chronic hypoxic respiratory insufficiency on 6-8L home O2, pulmonary hypertension, CKD, CAD s/p PCI with CANDELARIA x 3 to LAD (), AF (on warfarin), iCMP, admitted on 3/8/2024 with respiratory failure. He has been evaluated by the Pulmonary Hypertension team, who did not recommend PH directed therapies. We had trialed higher dose steroids given concern for IPAF earlier in this admission, but tapered down over the last several days in the absence of positive autoimmune serologies. On review of the records, " he appeared to eri at a FiO2 of around 0.4-0.5 after high-dose steroids, now requiring between 0.5-1.0. Notably, he feels better than he did previously. He was found to have Aspergillus fumigatus in his sputum 3/23, so was started on voriconazole. This is an extremely challenging situation: he did seem to benefit from higher-dose steroids, but if he has Aspergillus infection, the risk of treating with higher-dose steroids may outweigh potential benefit. Would recommend repeat CT chest to help us decide on next steps.     Recommendations  -- ESR (ordered)  -- CRP (ordered)  -- CT chest non-contrast (ordered)  -- procalcitonin (ordered)  -- NTproBNP (ordered)    Staffed with Dr. Perez.    Beatriz Balderas MD PGY4  Pulmonary and Critical Care

## 2024-03-28 NOTE — PROGRESS NOTES
Updates:     Reviewed CT chest images from 1600 hours and radiology report. No cavitary or extensive consolidations, less concerned about aggressive fungal/bacterial infection while on current unasyn on vfedn, diffuse multifocal GGO more in the lower lobes, in keeping with ILD flare compared to prior CT, I have changed his po prednisone to solumedrol 250 mg IV q8 hours for ILD exacerbation. We will monitor oxygenation response closely and get follow up CXR in 2-3 days.     Talita Perez MD

## 2024-03-28 NOTE — PROGRESS NOTES
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, COPD - Gold E, combined pulmonary fibrosis and emphysema, pulmonary HTN  Cardiac disease: CAD s/p CANDELARIA x3 to LAD, persistent AF s/p multiple cardioversions and ablations (2011) on coumadin, ischemic cardiomyopathy, s/p dual chamber iCD (6/2014)  CKD, Baseline Cr 1.8    Discussion:  71 yo M with hx of  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      Afebrile since arrival with leukocytosis in the 11-14K range, CRP neg, procalcitonin neg (both neg makes likelihood of bacterial infection very low). Initial CT c/w bronchopneumonia with bronchiectasis and mucosal thickening as well as patchy opacities. VQ scan without evidence of PE. Currently on steroid taper for possible interstitial pneumonia component with autoimmune features- not having significant improvement. Has persistently required O2 via HFNC or BiPAP without significant improvement despite course of levofloxacin with 1-3 days of other antibiotics given through stay.  Initial sputum culture on 3/11 with MSSA. Respiratory panel PCR,  COVID/flu/RSV negative. Repeat sputum culture 3/23 with Aspergillus species and candida. Hx cavitary lung lesion and hemoptysis in 2017- AFB cultures negative at the time. QuantGold pending. At risk for endemic fungal or NTM pulmonary infection with environmental exposures and underlying lung disease.     Unclear significance of sole positive culture with Aspergillus- may represent superficial colonization or contamination. Voriconazole was started after Aspergillus returned.  Patient is DNR/DNI and not stable for bronchoscopy so not able to get better sample for culture at this time. Repeat sputum cultures with yeast growth only.     Continues to have breathing difficulty, requiring BiPAP. Repeat imaging obtained today showing GGO. Re-assessed by pulmonary team and consideration  to start high dose steroids. B-D-glucan is elevated. Will check PJP PCR, DFA and start empiric bactrim in meantime.     Recommendations:  Okay to continue iv unasyn for now  Okay to continue po voriconazole, however, if no evidence of Aspergillus on repeat sputum, with negative AGM then would incline to stop antifungal.  Check for sputum PJP PCR and DFA  Start empiric po bactrim 1DS TID (TMP 15mg/kg/day)  Follow up on pending histo/blasto studies    Recommendations are discussed with the primary team.   Family updated at bedside.     ID team will continue to follow. Please reach out if any questions or concerns.     Total time spent during this encounter (chart review, documentation, MDM, coordination of care): 50 minutes    Tana Olivares MD   Infectious Diseases Staff Physician  Pager: [phone]  [employer] sally   03/28/2024         Interval History and Events:     Seen and examined - continues to have breathing difficulty, on BiPAP.         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      Since admission has had ongoing respiratory failure and consistently been on HFNC and BiPAP. Seen by pulmonology and started on steroid taper for interstitial pneumonia with autoimmune features. Thus far, sputum cultures with  MSSA (3/11) and Aspergillus and Candida (3/23).      Feels short of breath at all times with associated cough. Sputum production is more frequent in the mornings, +hemoptysis with blood streaked sputum. No nasal congestion, sore throat, fever, chills, night sweats, joint swelling, diarrhea.     Hx of cavitary lung lesions in 10/2017 - negative AFB x3, histo and blasto antigens and antibodies, legionella PCR.      Exposure history: Lives in [place], [place] with wife. No family history of tuberculosis or known TB exposure. Retired . 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 No recent travel outside of [place]. Has traveled to the [facility] ([place], [place], [place]) >10 years ago, no travel related illness. Spends a lot of time at [place] south of [place], [place] in summer. Previously did a lot of outdoor work, work in garage.         Review of Systems:   Targeted 10 point ROS was completed with pertinent positives and negatives are detailed above.         Antimicrobial history:     Current:  - Unasyn (3/24-present)  - Voriconazole (3/25-present)     Prior:  - ceftriaxone (3/11-3/12)  - Vancomycin (3/11-3/12)  - Doxycycline (3/12-3/13)  - Levofloxacin (3/13-3/19)  - Zosyn (3/16-3/17; 3/22-3/24)  - Augmentin (3/15)         Physical Examination:     Vital signs:  /78  "(BP Location: Left arm)   Pulse 79   Temp (!) 96.2  F (35.7  C) (Oral)   Resp 29   Ht 1.905 m (6' 3\")   Wt 104.2 kg (229 lb 11.2 oz)   SpO2 100%   BMI 28.71 kg/m      GENERAL: alert, sitting up in chair  RESP: On BiPAP, +crackles on bibasilar areas, moving air throughout lung fields  CV: regular rate and rhythm, normal S1 S2, no S3 or S4, no murmur appreciated  ABDOMEN: soft, nontender, no hepatosplenomegaly, no masses and bowel sounds normal  MS: no gross musculoskeletal defects noted, no edema  NEURO: no focal deficits    Lines and devices:    PIV CDI, non-tender, no surrounding erythema.    Labs, Microbiology and Imaging studies reviewed.          Laboratory Data:       Microbiology:    Culture   Date Value Ref Range Status   03/27/2024 Culture in progress  Preliminary   03/27/2024 No growth after 1 day  Preliminary   03/27/2024   Final    >10 Squamous epithelial cells/low power field indicates oral contamination. Please recollect.   03/23/2024 2+ Candida albicans (A)  Final     Comment:     Susceptibilities not routinely done, refer to antibiogram to view typical susceptibility profiles   03/23/2024 1+ Aspergillus fumigatus complex (A)  Final   03/22/2024 No Growth  Final   03/22/2024 No Growth  Final   03/21/2024 No Growth  Final   03/21/2024 No Growth  Final   03/16/2024 No Growth  Final   03/16/2024 No Growth  Final   03/11/2024 No Growth  Final   03/11/2024 No Growth  Final   03/11/2024 3+ Staphylococcus aureus (A)  Final   03/11/2024 2+ Normal godwin  Final   05/18/2023 No Growth  Final   05/18/2023 No Growth  Final   10/19/2021 No Growth  Final     Inflammatory Markers:   Recent Labs   Lab Test 03/28/24  1159 03/11/24  1028 02/15/22  0941   SED 18 56*  --    CRP  --   --  <2.9     Urine Studies:    Recent Labs   Lab Test 03/16/24  2059 03/08/24  1541   LEUKEST Negative Negative   WBCU 0 <1     Hematology Studies:    Recent Labs   Lab Test 03/28/24  0326 03/27/24  0452 03/26/24  0555 03/25/24  0541 " 03/24/24  0439 03/23/24  0317   WBC 10.2 8.5 10.2 14.1* 12.9* 14.4*   HGB 8.3* 8.0* 8.1* 8.4* 8.3* 8.9*   * 104* 103* 104* 104* 103*   PLT 78* 93* 100* 107* 101* 121*      Metabolic Studies:   Recent Labs   Lab Test 03/28/24  1905 03/28/24  1159 03/28/24  0326 03/27/24  1738 03/27/24  0452 03/26/24  0555 03/25/24  0541 03/24/24  0439   NA  --   --  144  --  145 144 143 140   POTASSIUM 4.5 3.5 3.3* 4.4 3.3* 3.6 3.7 3.8   CHLORIDE  --   --  107  --  109* 109* 109* 108*   CO2  --   --  24  --  23 23 23 22   BUN  --   --  50.5*  --  49.9* 50.8* 49.9* 49.9*   CR  --   --  1.97*  --  1.95* 1.88* 1.88* 1.88*   GFRESTIMATED  --   --  34*  --  34* 36* 36* 36*     Hepatic Studies:   Recent Labs   Lab Test 03/26/24  0555 03/13/24  0644 03/11/24  0617 03/09/24  2315 05/20/23  0427 05/19/23  0446 05/18/23  0855   BILITOTAL 1.2 1.2 1.8* 1.7*  --  1.0 1.0   ALKPHOS 37* 61 66 77  --  56 69   ALBUMIN 3.2* 3.4* 3.9 4.3 3.3* 3.3* 3.7   AST 25 30 32 33  --  24 24   ALT 31 17 13 13  --  15 15     Immune Globulin Studies:   Recent Labs   Lab Test 03/10/24  0623        Hepatitis B Testing:   Recent Labs   Lab Test 03/10/24  0623 02/15/22  0941   HBCAB Nonreactive Nonreactive   HEPBANG  --  Nonreactive   HBEAGN Negative  --      Hepatitis C Testing:   Hepatitis C Antibody   Date Value Ref Range Status   03/09/2024 Nonreactive Nonreactive Final     Comment:     A nonreactive screening test result does not exclude the possibility of exposure to or infection with HCV. Nonreactive screening test results in individuals with prior exposure to HCV may be due to antibody levels below the limit of detection of this assay or lack of reactivity to the HCV antigens used in this assay. Patients with recent HCV infections (<3 months from time of exposure) may have false-negative HCV antibody results due to the time needed for seroconversion (average of 8 to 9 weeks).   02/15/2022 Nonreactive Nonreactive Final            Last Pathology  Report   Case Report   Date Value Ref Range Status   04/28/2017   Final    Surgical Pathology Report                         Case: [id number]                                    Authorizing Provider:  Jose Whittington MD         Collected:           04/28/2017 1026              Ordering Location:      [facility]  Received:            04/28/2017 1118              Pathologist:           Miguel Angel Paz MD PhD                                                        Specimens:   A) - Large Intestine, Right Colon, Ascending, Polyp, 2 polyps                                       B) - Colon, Polyp, Transverse                                                               Clinical Information   Date Value Ref Range Status   04/28/2017   Final    Pre-op Diagnosis:  Screening [Z13.9]  Time in Formalin:  A) 1026; B) 1030       Final Diagnosis   Date Value Ref Range Status   04/28/2017   Final    A) COLON, RIGHT SIDE, BIOPSIES       - MULTIPLE FRAGMENTS OF TUBULAR ADENOMA       - NEGATIVE FOR HIGH-GRADE DYSPLASIA AND NEGATIVE FOR MALIGNANCY    B) TRANSVERSE COLON, BIOPSY       - FRAGMENTS OF BENIGN COLONIC MUCOSA WITH MELANOSIS COLI       - NO EVIDENCE OF HYPERPLASTIC AND NO EVIDENCE OF ADENOMATOUS CHANGE  Electronically signed by Miguel Angel Paz MD PhD on 5/1/2017 at  1:14 PM              Imaging:     3/28/2024 CT-chest w/o:  IMPRESSION:  1. Groundglass opacities and nodularity in the lungs likely infectious  in origin or inflammatory such as acute exacerbation of interstitial  lung disease.  2. Main pulmon    US lower extremity (3/22/24)  IMPRESSION: No deep venous thrombosis demonstrated in either leg.      CXR (3/22/24)  IMPRESSION:   Cardiomegaly with slightly worsened bibasilar interstitial opacities  likely representing a combination of chronic fibrosis and atelectasis  and/or edema.     NM Lung Scan VQ (3/22/24)  Impression:  1. Pulmonary emboli is not present.

## 2024-03-28 NOTE — PROGRESS NOTES
[facility]    Medicine Progress Note - Hospitalist Service, GOLD TEAM 7    Date of Admission:  3/9/2024    Assessment & Plan   78 yo male with hx of depression, HLD, HTN, CAD s/p CANDELARIA placement (9/2017), CKDIII, afib s/p cardioversions and ablations in 2011 (on warfarin PTA), ICM s/p initial PPM placement in 1999 followed by ICD placement (6/2014), and chronic hypoxic respiratory failure with BL supplemental O2 requirement of 6-8L 2/2 CPFE and group III pulm HTN admitted initially to [facility] 3/8-3/9 for acute on chronic hypoxic respiratory failure and CHRISSY, transferred to Cards II service ICU at [facility] for higher level of care 3/9, transferred to medicine 3/15.    Changes today:   - Updated wife, Neela, at bedside regarding status of Salvador's respiratory failure. Concerned that he is not making significant improvement   - Sputum culture pending with AFB (Non-TB), fungal, bacterial   - Pulmonary team re- consulted.    - Continue prednisone taper   - Transition to easy to chew diet   - ABG for tomorrow   - Ordered ESR, CRP, procalcitonin, NTproBNP, NCCT chest   - Change from lasix to Bumex 3mg BID    Aspergillus PNA, POA  Acute on chronic hypoxic respiratory failure secondary to possible bacterial pneumonia and pulmonary fibrosis combined with emphysema/Gold D COPD and group 3 pulmonary hypertension, POA  The patient priorly completed 7 days course of levofloxacin on 3/19.  Prior infectious workup showed pansensitive Staphylococcus aureus dated 3/11.  COVID-19/influenza/RSV/respiratory virus panel/MRSA swab have all been done during this admission and have all been negative. Aspergillus was found on the patient sputum culture dated 3/23 and updated on 3/25 at 3 PM.  Identification of species and sensitivities are currently pending.  EKG with QTC of 478. Updated CXR on 3/28 shows worsening pulmonary edema vs possible infection. Pulm recommend NCCT to assess the need for  high dose steroids with slow taper to treat IPAF/NSIP and to rule out cavitary lung disease or consolidation. CRP, ESR and procalcitonin are normal.   - ID consulted, appreciate assistance.   - switch to voriconazole 200 mg BID PO, to be discontinued in 1 or 2 days pending fungal results    - Positive B-D Glucan, negative galactomannan   - Pending: histo urine ag, blastomyces serum ag, coccidioides ag   - Pending: Sputum cultures - bacterial, fungal and NTM AFB    -Continue Unasyn for total course of antibiotic therapy of 7 days, end date has been placed in the epic  -Continue taper of prednisone per recommendation of pulmonology -  20 x 5 days (started on 3/27), 10 (to start on 4/1) x 5 days, then off    -Continue PPI while on steroids  -Targeting high flow nasal cannula in the morning and BiPAP at night for SpO2 of 88 to 92%  -Continue Mucomyst and albuterol every 4 hours, Breo Ellipta 1 puff daily, Incruse Ellipta 1 puff daily and albuterol every 4 hours as needed  -Appreciative to the pulmonology service, have been re-consulted   - Check NTproBNP, NCCT chest     Acute on chronic heart failure with recovered LVEF 55% and moderately reduced right ventricular function  The patient had an episode of hypotension without symptoms within the last 24 hours secondary to increased urinary output in the setting of diuresis.  All diuretics were held on 3/23 and the patient received small volume resuscitation with 25% albumin at 25 g.  Blood pressure improved on 3/24. Lasix IV restarted on 3/25, switched to PO on 3/26. UOP on 3/25 was 1.7 L with net of - 1.1 L. UOP on 3/26 2.2 L. His weight has fluctuated  from 233 to 231 and 234 lb for the last 3 days. UOP was down from 2.2 L to 1.8 L on 3/27 with additional 40 mg lasix. CXR on 3/27 showed worsening pulmonary edema with increased small pleural effusion requiring additional dose of lasix. Switched back to bumex 3mg BID today.   -Switched from lasix to bumex 3mg BID  "  -Continue acetazolamide at 125 mg daily  -Continue low-dose spironolactone at 12.5 mg daily   -Continue Jardiance 10 mg daily  -Ordered lymphedema consult for assistance with bilateral lower extremity edema    Anemia  Hgb has trended down from 13 since admission to 8.0 on 3/27. Continue to monitor  CBC am daily    Paroxysmal atrial fibrillation, [id number] score of 3, POA   - Decrease metoprolol succinate to 6.25 mg daily given concerns for bradycardia without symptoms  - Continue anticoagulation with warfarin    Oral candidiasis, not clear if present on admission  -nystatin    Chronic medical problems:  # Hyperlipidemia and coronary artery disease,  aspirin 81 mg daily, currently holding statin  # Depression, continue Prozac 20 mg daily        Diet: Soft & Bite Sized Diet (level 6) Thin Liquids (level 0)  Room Service  Snacks/Supplements Adult: Other; Allow supplements PRN only, if requested. Please do not send automatically anymore.; Between Meals    DVT Prophylaxis: Warfarin  Sawyer Catheter: Not present  Lines: None     Cardiac Monitoring: None  Code Status: No CPR- Do NOT Intubate      Clinically Significant Risk Factors        # Hypokalemia: Lowest K = 3.3 mmol/L in last 2 days, will replace as needed       # Hypoalbuminemia: Lowest albumin = 3.2 g/dL at 3/26/2024  5:55 AM, will monitor as appropriate   # Thrombocytopenia: Lowest platelets = 78 in last 2 days, will monitor for bleeding   # Hypertension: Noted on problem list    # Acute heart failure with preserved ejection fraction: heart failure noted on problem list, last echo with EF >50%, and receiving IV diuretics      # DMII: A1C = 6.9 % (Ref range: <5.7 %) within past 6 months   # Overweight: Estimated body mass index is 29.35 kg/m  as calculated from the following:    Height as of this encounter: 1.905 m (6' 3\").    Weight as of this encounter: 106.5 kg (234 lb 12.6 oz).        # Financial/Environmental Concerns: none  # COPD: noted on problem " list  # ICD device present       Disposition Plan             Kiet Hughes  Hospitalist Service, GOLD TEAM 7  [facility]  Securely message with [employer] (more info)  Text page via [employer] Paging/Directory   See signed in provider for up to date coverage information        Physician Attestation   I, Jaycee Gomez MD, was present with the medical/OZ student who participated in the service and in the documentation of the note.  I have verified the history and personally performed the physical exam and medical decision making.  I agree with the assessment and plan of care as documented in the note.      Key findings:   Having more respiratory distress today. No crackles appreciated on exam but definitely decreased air movement. Requiring bipap due to desats while on HFNC even with new nose O2 probe. Pulmonary reconsulted. XR with continuing to worsen pulm edema despite active diuresis - net negative ~1L yesterday. Increased diuresis and transitioned to bumex in case of lasix fatigue and concern for side effects with too high of a dose of lasix. Will aim for net negative 2L today and will diurese aggressively.     Please see A&P for additional details of medical decision making.  70 MINUTES SPENT BY ME on the date of service doing chart review, history, exam, documentation & further activities per the note.    I have personally reviewed the following data over the past 24 hrs:    10.2  \   8.3 (L)   / 78 (L)     144 107 50.5 (H) /  117 (H)   3.5 24 1.97 (H) \     Procal: 0.08 CRP: 4.01 Lactic Acid: N/A       INR:  2.96 (H) PTT:  N/A   D-dimer:  N/A Fibrinogen:  N/A         Jaycee Gomez MD  Date of Service (when I saw the patient): 03/26/24    ______________________________________________________________________    Interval History   Patient was sating at >88% on BiPAP 70-90% overnight on 100% FiO2 HFNC 55-75L. States he is feeling better but desating when taken of  BiPAP.Is making good urine. Wife updated at bedside.     Physical Exam   Vital Signs: Temp: 97.8  F (36.6  C) Temp src: Axillary BP: 90/61 Pulse: 92   Resp: 18 SpO2: 93 % O2 Device: BiPAP/CPAP Oxygen Delivery: 75 LPM  Weight: 234 lbs 12.64 oz    Constitutional: Awake, alert, no apparent distress  HEENT: Hearing aids present  Respiratory: On BiPAP, interactive and communicative  Cardiovascular: Regular rate and rhythm, normal S1 and S2, and no murmur noted.  GI: Abdomen is soft, non-distended, non-tender.  Skin: No rashes, no cyanosis, no edema.  Musculoskeletal: No joint swelling, erythema or tenderness.  Psychiatric: Alert, oriented to person, place and time, no obvious anxiety or depression.   LE: improving bilateral edema present.

## 2024-03-28 NOTE — PLAN OF CARE
Neuro: A&Ox 4.    Cardiac: No tele. HR 80-90. BP /60-70's. Afebrile.    Respiratory: Sating > 88% on BiPAP 70-90% overnight and on HFNC 100%FiO2 55-75L. Patient desats quickly with with little movement.  GI/: Adequate urine output via urinal. BM X1 via bedside commode.   Diet/appetite: Tolerating soft and bite sized diet with thin liquids. Eating well. No N/V.  Activity:  Assist of 2 with GB and walker.   Pain: Patient denies pain.  Skin: No new deficits noted.   LDA's: R PIV.     Plan: Continue with POC. Notify primary team with changes.

## 2024-03-28 NOTE — PLAN OF CARE
Neuro: A&Ox4.   Cardiac: Not on tele, denies chest pain.  Respiratory: Sating between low 80s on HFNC with Fi02 100% and flow 55lpm-75lpm.  Switched to bipap most of the day with Fi02 65%-85% sating between 88-96%.  Provider notified and pulmonary consulted, with orders.  GI/: Started on Bumex with adequate urine output. No BM this shift.  Diet/appetite: Tolerating soft bite sized diet. Eating well.  No nausea and vomiting noted.  Activity:  Assist of 1, up to chair and with transfers.  Pain: At acceptable level on current regimen.   Skin: No new deficits noted.  LDA's:  Right PIV, saline lock.    Potassium replaced and  recheck at 1900.  CT chest done and resulted.  Still for sputum and Blood gas arterial tomorrow.    Plan: Continue with POC. Notify primary team with changes.

## 2024-03-29 ENCOUNTER — DOCUMENTATION ONLY (OUTPATIENT)
Dept: ANTICOAGULATION | Facility: CLINIC | Age: 79
End: 2024-03-29
Payer: COMMERCIAL

## 2024-03-29 VITALS
OXYGEN SATURATION: 92 % | WEIGHT: 229.7 LBS | BODY MASS INDEX: 28.56 KG/M2 | RESPIRATION RATE: 29 BRPM | TEMPERATURE: 96.2 F | HEART RATE: 79 BPM | SYSTOLIC BLOOD PRESSURE: 113 MMHG | DIASTOLIC BLOOD PRESSURE: 78 MMHG | HEIGHT: 75 IN

## 2024-03-29 DIAGNOSIS — I48.19 PERSISTENT ATRIAL FIBRILLATION (H): ICD-10-CM

## 2024-03-29 DIAGNOSIS — I48.0 PAROXYSMAL ATRIAL FIBRILLATION (H): Primary | ICD-10-CM

## 2024-03-29 LAB
BACTERIA SPT CULT: ABNORMAL
GRAM STAIN RESULT: ABNORMAL
SCANNED LAB RESULT: NORMAL
SCANNED LAB RESULT: NORMAL

## 2024-03-29 PROCEDURE — 99207 PR NO BILLABLE SERVICE THIS VISIT: CPT | Mod: FS | Performed by: STUDENT IN AN ORGANIZED HEALTH CARE EDUCATION/TRAINING PROGRAM

## 2024-03-29 ASSESSMENT — ACTIVITIES OF DAILY LIVING (ADL)
ADLS_ACUITY_SCORE: 38

## 2024-03-29 NOTE — PROGRESS NOTES
Received pt on BiPAP at 85% FiO2, erratic oxygen saturation reading. Kept oxygen probe on multiple areas. Rounds done by RT, treatment given and adjusted Fi02 to 100% ordered to titrate accordingly. Called back RT due to being unable to obtain an accurate oxygen saturation reading. RT advised author to keep FiO2 at 100%. MD was notified.     2235 HOB elevated and pt was shifted from BiPAP to HFNC for PO meds. Pt was uncooperative and became drowsy, author immediately switched pt back to BiPAP. Pt became unresponsive and was not breathing, with weak pulse noted. Rapid response initiated. Rapid response team came and assessed the pt. RRT provider noted that pt's pulse was weak and irregular. Through the course of the rapid response, the pt became pulseless, confirmed DNR/DNI. Primary team informed and arrived at the bedside to pronounce the time of death.    6198 MD pronounced time of death. Family was informed. Organ coordinator informed. Death notification document completed.    5321 Organ and tissue coordinator stated that pt was cleared to be transported to the [facility].  Security Staff was given death notification document and removed the pt from the unit.

## 2024-03-29 NOTE — PROGRESS NOTES
Brief Social Work Progress Note    JUDIE received a VM from the 6B charge RN (Nara) at 10:55am about the pt's wife wanting the pt's diagnosis for her pension? JUDIE called the 6B charge RN at 11:08am, spoke to Nara, and she confirmed that that was what the pt's wife was asking about.    JUDIE called the pt's wife Neela (Phone: [phone]) at 11:17am and spoke with wife and daughter on speakerphone. Per wife, they need the pt's underlying diagnoses (COPD and CHF) added to the pt's death certificate. Per wife and daughter, it's needed by the  home and wife's pension.    JUDIE sent a Vocera to the Gold 7 attending (Patricia) and she told the SW that they're already working on it and once she gets the death certificate notification, they'll add it. JUDIE asked the attending to call pt's wife Neela later as well.    JUDIE called Neela at 11:41am and gave her and the daughter an update.    JUDIE called 6B charge RN at 11:42am , spoke to Nara, and gave her an update.    ___________________    OMA Francis, LGSW  6C , covering beds 6401 to 6519  [facility]   Phone: [phone]  Pager: [phone]

## 2024-03-29 NOTE — PROGRESS NOTES
ANTICOAGULATION  MANAGEMENT    Buck Sinclair is being discharged from the [facility] Anticoagulation Management Program ([facility]).    Reason for discharge:  on 3/28/24 while inpatient     Anticoagulation episode resolved, ACC referral closed, and Standing order discontinued    Routing to primary care provider as an FYI    Gisele Saul RN

## 2024-03-29 NOTE — CODE/RAPID RESPONSE
"   24   Call Information   Date of Call 24   Time of Call    Name of person requesting the team Tamanna Henderson   Title of person requesting team RN   RRT Arrival time    Time RRT ended    Reason for call   Type of RRT Adult   Primary reason for call Respiratory;Staff concerned   Respiratory Rate less than 8;Respiratory pattern change;O2sat less than 88% for greater than 5 minutes despite O2   Was patient transferred from the ED, ICU, or PACU within last 24 hours prior to RRT call? No   SBAR   Situation Pt became apneic, stopped triggering BiPAP.   Background 80 yo male with hx of depression, HLD, HTN, CAD s/p CANDELARIA placement (2017), CKDIII, afub s/p cardioversions and ablations in  (on warfarin PTA), ICM s/p initial PPM placement in  followed by ICD placement (2014), and chronic hypoxic respiratory failure with BL supplemental O2 requirement of 6-8L 2/2 CPFE and group III pulm HTN admitted initially to [facility] 3/8-3/9 for acute on chronic hypoxic respiratory failure and CHRISSY, transferred to Cards II service ICU at [facility] for higher level of care 3/9, now medically stable to transfer to [facility] floor on 3/15.\"   Notable History/Conditions Cancer;COPD;Hypertension   Assessment No pulse found in groin, no spontaneous breathing/air movement, not triggering BiPAP.   Interventions No interventions   Patient Outcome   Patient Outcome Patient   (No CPR/DNI. Team to bedside.)   RRT Team   Attending/Primary/Covering Physician Gold 7   Date Attending Physician notified 24   Time Attending Physician notified    Physician(s) Vishal Hubbard NP   Lead RN Get MELENDREZ     "

## 2024-03-29 NOTE — CODE/RAPID RESPONSE
Brief Rapid Response Team Note -    I was called after staff could not obtain a pulse.  Per report, he was sitting up to take medications when he became unresponsive and did not appear to be breathing.    When I arrived he was unresponsive with no palpable pulses.    As the patient is DNR/DNI, we did not start chest compressions.  After multiple pulse checks and cardiac auscultation revealed no cardiac activity, I discontinued his bipap.  I paged his primary team to pronounce the patient.  They are reaching out to family as well.

## 2024-03-29 NOTE — DEATH PRONOUNCEMENT
MD DEATH PRONOUNCEMENT    Called to pronounce Buck Sinclair dead.    Physical Exam: Spontaneous respirations absent, Breath sounds absent, Carotid pulse absent, Heart sounds absent, Pupillary light reflex absent, and Corneal blink reflex absent    Patient was pronounced dead at 10:58  PM, 2024.    Preliminary Cause of Death: Acute respiratory failure    Principal Problem:    Pulmonary hypertension (H)       Infectious disease present?: NO    Communicable disease present? (examples: HIV, chicken pox, TB, Ebola, CJD) :  NO    Multi-drug resistant organism present? (example: MRSA): NO    Please consider an autopsy if any of the following exist:  NO Unexpected or unexplained death during or following any dental, medical, or surgical diagnostic treatment procedures.   NO Death of mother at or up to seven days after delivery.     NO All  and pediatric deaths.     NO Death where the cause is sufficiently obscure to delay completion of the death certificate.   NO Deaths in which autopsy would confirm a suspected illness/condition that would affect surviving family members or recipients of transplanted organs.     The following deaths must be reported to the 's Office:  NO A death that may be due entirely or in part to any factors other than natural disease (recent surgery, recent trauma, suspected abuse/neglect).   NO A death that may be an accident, suicide, or homicide.     NO Any sudden, unexpected death in which there is no prior history of significant heart disease or any other condition associated with sudden death.   NO A death under suspicious, unusual, or unexpected circumstances.    NO Any death which is apparently due to natural causes but in which the  does not have a personal physician familiar with the patient s medical history, social, or environmental situation or the circumstances of the terminal event.   NO Any death apparently due to Sudden Infant Death Syndrome.      NO Deaths that occur during, in association with, or as consequences of a diagnostic, therapeutic, or anesthetic procedure.   NO Any death in which a fracture of a major bone has occurred within the past (6) six months.   NO A death of persons note seen by their physician within 120 days of demise.     NO Any death in which the  was an inmate of a public institution or was in the custody of Law Enforcement personnel.   NO  All unexpected deaths of children   NO Solid organ donors   NO Unidentified bodies   Unknown- Family had not yet decided about  arrangements Deaths of persons whose bodies are to be cremated or otherwise disposed of so that the bodies will later be unavailable for examination;   NO Deaths unattended by a physician outside of a licensed healthcare facility or licensed residential hospice program   NO Deaths occurring within 24 hours of arrival to a health care facility if death is unexpected.    NO Deaths associated with the decedent s employment.   NO Deaths attributed to acts of terrorism.   NO Any death in which there is uncertainty as to whether it is a medical examiner s care should be discussed with the medical investigator.        Body disposition: Per RN, who spoke with patient's wife, autopsy was declined.    Spoke with [facility] coroner's office who declined jurisdiction. Also, if family decides upon cremation, the  home will apply for a cremation certificate per ME

## 2024-03-29 NOTE — DISCHARGE SUMMARY
[facility]    Death Summary - Hospitalist Service, GOLD TEAM 7     Date of Admission:  3/9/2024  Date of Death: 3/29/2024  4:46 AM  Discharging Provider: Jaycee Gomez MD    Discharge Diagnoses    - Acute on chronic hypoxic respiratory failure secondary to possible bacterial pneumonia and pulmonary fibrosis combined with emphysema/Gold D COPD and group 3 pulmonary hypertension, POA    - Acute on chronic heart failure with recovered LVEF 55% and moderately reduced right ventricular function    - Paroxysmal atrial fibrillation, [id number] score of 3  Cause of death: Cardiac arrest 2/2 acute on chronic hypoxic respiratory failure 2/2 combination of end stage COPD, pulmonary fibrosis, group 3 pulmonary HTN, and CHF (EF 55%)    Hospital Course      78 yo male with hx of depression, HLD, HTN, CAD s/p CANDELARIA placement (9/2017), CKDIII, afib s/p cardioversions and ablations in 2011 (on warfarin PTA), ICM s/p initial PPM placement in 1999 followed by ICD placement (6/2014), and chronic hypoxic respiratory failure with BL supplemental O2 requirement of 6-8L 2/2 CPFE and group III pulm HTN admitted initially to [facility] 3/8-3/9 for acute on chronic hypoxic respiratory failure and CHRISSY, transferred to Cards II service ICU at [facility] for higher level of care 3/9, transferred to medicine 3/15.     Acute on chronic hypoxic respiratory failure secondary to possible bacterial pneumonia and pulmonary fibrosis combined with emphysema/Gold D COPD and group 3 pulmonary hypertension, POA  Acute on chronic heart failure with recovered LVEF 55% and moderately reduced right ventricular function  The patient priorly completed 7 days course of levofloxacin on 3/19.  Prior infectious workup showed pansensitive Staphylococcus aureus dated 3/11.  COVID-19/influenza/RSV/respiratory virus panel/MRSA swab have all been done during this admission and have all been negative. Aspergillus isolated  in sputum but unclear of pathologic nature after conversations with ID - galactomannan was negative.  Identification of species and sensitivities are currently pending.  EKG with QTC of 478. He started a prednisone taper as well as unasyn and voriconazole to cover any infectious organisms while awaiting further fungal cultures. CRP, ESR and procalcitonin are normal. Repeat CT showed worsening pulmonary edema. Attempted to aggressive diurese and Buck was able to make enough urine to be net negative 1-2L although remained bipap dependent. He was not a candidate for any vasodilator therapies for his pulmonary hypertension. He was maintaining on HFNC and was able to wean down to 55L however, over the previous 24-36 hours he would desat when taken off of the bipap.  Updated CXR on 3/28 shows worsening pulmonary edema vs possible infection. Pulm recommend NCCT to assess the need for high dose steroids with slow taper to treat IPAF/NSIP and to rule out cavitary lung disease or consolidation. He was started again on high dose steroids and maintained on antimicrobials. Multiple conversations were had with wife, Neela, and daughter, Shahrzad, regarding the critical nature of his prognosis and how concerning his constellation of medical issues are especially the heart failure in combination with the worsening respiratory status. Ultimately patient had a cardiac event while attempting to sit up that caused him to lose pulses. As his code status was DNR/DNI, no further interventions were able to be performed and Buck . Family was updated by the overnight team and I contacted wife, Neela, again during the day to answer any additional questions she had.        Jaycee Gomez MD  [facility]  ______________________________________________________________________      Significant Results and Procedures   See in EMR    Consultations This Hospital Stay   PHYSICAL THERAPY ADULT IP  CONSULT  OCCUPATIONAL THERAPY ADULT IP CONSULT  PHARMACY IP CONSULT  PHYSICAL THERAPY ADULT IP CONSULT  OCCUPATIONAL THERAPY ADULT IP CONSULT  PULMONARY GENERAL ADULT IP CONSULT  PHARMACY TO DOSE VANCO  RHEUMATOLOGY IP CONSULT  SPEECH LANGUAGE PATH ADULT IP CONSULT  NURSING TO CONSULT FOR VASCULAR ACCESS CARE IP CONSULT  PHARMACY TO DOSE WARFARIN  CARE MANAGEMENT / SOCIAL WORK IP CONSULT  NURSING TO CONSULT FOR VASCULAR ACCESS CARE IP CONSULT  LYMPHEDEMA THERAPY IP CONSULT  INFECTIOUS DISEASE GENERAL ADULT IP CONSULT  PULMONARY GENERAL ADULT IP CONSULT  SPEECH LANGUAGE PATH ADULT IP CONSULT  PHYSICAL THERAPY ADULT IP CONSULT  OCCUPATIONAL THERAPY ADULT IP CONSULT    Primary Care Physician   Vivek Guerrero    Time Spent on this Encounter   I, Jaycee Gomez MD, personally saw the patient today and spent less than or equal to 30 minutes discharging this patient.

## 2024-03-29 NOTE — PROGRESS NOTES
Brief Medicine Note:    Received page around 22:30 that nursing was struggling to get accurate O2 saturation reading on patient. RT recommended increasing BiPAP to 100% in the interim. Had sent page inquiring on patient's breathing and was awaiting response. In the interim, nursing went to give him his evening medications and had switched him to HFNC when he abruptly stopped breathing and became unresponsive. RRT was called however patient DNR/DNI and had no palpable pulses on their assessment.     Patient pronounced by Dr. Hickey and family updated.    Brenda Wick PA-C  Internal Medicine OZ Hospitalist  [facility]

## 2024-04-01 LAB — SCANNED LAB RESULT: NORMAL

## 2024-04-24 LAB — BACTERIA SPT CULT: ABNORMAL

## 2024-05-22 LAB
ACID FAST STAIN (ARUP): NORMAL

## (undated) DEVICE — CATH PULM ART 7FR X 110CM, SWA

## (undated) DEVICE — CATH ANGIO INFINITI JL4 4FRX100CM 538420

## (undated) DEVICE — SHEATH 7FR ULTIMUM 407847

## (undated) DEVICE — ELECTRODE ADULT PACING MULTI P-211-M1

## (undated) DEVICE — CUSTOM PACK CORONARY SAN5BCRHEA

## (undated) DEVICE — GUIDEWIRE STRT .025 SCN M001491001

## (undated) DEVICE — SYR ANGIOGRAPHY MULTIUSE KIT ACIST 014612

## (undated) DEVICE — CUSTOM PACK PACER ICD SAN5BPCHEA

## (undated) DEVICE — SHEATH 4FR ULTIMUM 407840

## (undated) DEVICE — ESU BLADE PEAK PLASMA 3.0S PS210-030S

## (undated) DEVICE — KIT IN LINE ROOM TEMP INJ 93610

## (undated) DEVICE — KIT RIGHT HEART CATH 60130719

## (undated) DEVICE — KIT HAND CONTROL ACIST 014644 AR-P54

## (undated) DEVICE — MANIFOLD KIT ANGIO AUTOMATED 014613

## (undated) DEVICE — SHEATH ULTIMUM 6FR 12CM 407845

## (undated) DEVICE — INTRO MICRO MINI STICK 4FR STD NITINOL

## (undated) DEVICE — WIRE GUIDE 0.025"X150CM EMERALD J TIP 502524

## (undated) DEVICE — CATH DIAG 4FR ANG PIG 538453S

## (undated) DEVICE — Device

## (undated) DEVICE — INTRO SHEATH 7FRX10CM PINNACLE RSS702

## (undated) DEVICE — CATH ANGIO INFINITI JR4 4FRX100CM 538421

## (undated) DEVICE — PACK HEART RIGHT CUSTOM SAN32RHF18

## (undated) DEVICE — CATH ANGIO INFINITI JL5 4FRX100CM 538422

## (undated) RX ORDER — HEPARIN SODIUM 1000 [USP'U]/ML
INJECTION, SOLUTION INTRAVENOUS; SUBCUTANEOUS
Status: DISPENSED
Start: 2022-10-28

## (undated) RX ORDER — LIDOCAINE HYDROCHLORIDE 10 MG/ML
INJECTION, SOLUTION EPIDURAL; INFILTRATION; INTRACAUDAL; PERINEURAL
Status: DISPENSED
Start: 2022-10-28

## (undated) RX ORDER — METHOHEXITAL IN WATER/PF 100MG/10ML
SYRINGE (ML) INTRAVENOUS
Status: DISPENSED
Start: 2021-11-19

## (undated) RX ORDER — FENTANYL CITRATE 50 UG/ML
INJECTION, SOLUTION INTRAMUSCULAR; INTRAVENOUS
Status: DISPENSED
Start: 2022-01-24

## (undated) RX ORDER — FENTANYL CITRATE 50 UG/ML
INJECTION, SOLUTION INTRAMUSCULAR; INTRAVENOUS
Status: DISPENSED
Start: 2022-10-28